# Patient Record
Sex: MALE | Race: BLACK OR AFRICAN AMERICAN | HISPANIC OR LATINO | URBAN - METROPOLITAN AREA
[De-identification: names, ages, dates, MRNs, and addresses within clinical notes are randomized per-mention and may not be internally consistent; named-entity substitution may affect disease eponyms.]

---

## 2018-01-01 ENCOUNTER — OUTPATIENT (OUTPATIENT)
Dept: OUTPATIENT SERVICES | Facility: HOSPITAL | Age: 38
LOS: 1 days | End: 2018-01-01
Payer: MEDICAID

## 2018-01-01 DIAGNOSIS — Z98.89 OTHER SPECIFIED POSTPROCEDURAL STATES: Chronic | ICD-10-CM

## 2018-01-01 PROCEDURE — G9001: CPT

## 2018-01-10 ENCOUNTER — EMERGENCY (EMERGENCY)
Facility: HOSPITAL | Age: 38
LOS: 1 days | Discharge: HOME | End: 2018-01-10
Attending: EMERGENCY MEDICINE
Payer: MEDICARE

## 2018-01-10 VITALS
DIASTOLIC BLOOD PRESSURE: 72 MMHG | SYSTOLIC BLOOD PRESSURE: 107 MMHG | WEIGHT: 315 LBS | OXYGEN SATURATION: 98 % | RESPIRATION RATE: 19 BRPM | HEART RATE: 92 BPM | TEMPERATURE: 98 F | HEIGHT: 78 IN

## 2018-01-10 DIAGNOSIS — Z98.89 OTHER SPECIFIED POSTPROCEDURAL STATES: Chronic | ICD-10-CM

## 2018-01-10 LAB
ALBUMIN SERPL ELPH-MCNC: 3.2 G/DL — LOW (ref 3.5–5)
ALP SERPL-CCNC: 100 U/L — SIGNIFICANT CHANGE UP (ref 40–120)
ALT FLD-CCNC: 41 U/L DA — SIGNIFICANT CHANGE UP (ref 10–60)
ANION GAP SERPL CALC-SCNC: 8 MMOL/L — SIGNIFICANT CHANGE UP (ref 5–17)
AST SERPL-CCNC: 26 U/L — SIGNIFICANT CHANGE UP (ref 10–40)
BASOPHILS # BLD AUTO: 0.1 K/UL — SIGNIFICANT CHANGE UP (ref 0–0.2)
BASOPHILS NFR BLD AUTO: 0.5 % — SIGNIFICANT CHANGE UP (ref 0–2)
BILIRUB SERPL-MCNC: 0.2 MG/DL — SIGNIFICANT CHANGE UP (ref 0.2–1.2)
BUN SERPL-MCNC: 28 MG/DL — HIGH (ref 7–18)
CALCIUM SERPL-MCNC: 8.6 MG/DL — SIGNIFICANT CHANGE UP (ref 8.4–10.5)
CHLORIDE SERPL-SCNC: 99 MMOL/L — SIGNIFICANT CHANGE UP (ref 96–108)
CO2 SERPL-SCNC: 29 MMOL/L — SIGNIFICANT CHANGE UP (ref 22–31)
CREAT SERPL-MCNC: 3.81 MG/DL — HIGH (ref 0.5–1.3)
EOSINOPHIL # BLD AUTO: 0.2 K/UL — SIGNIFICANT CHANGE UP (ref 0–0.5)
EOSINOPHIL NFR BLD AUTO: 1.4 % — SIGNIFICANT CHANGE UP (ref 0–6)
GLUCOSE SERPL-MCNC: 134 MG/DL — HIGH (ref 70–99)
HCT VFR BLD CALC: 40.2 % — SIGNIFICANT CHANGE UP (ref 39–50)
HGB BLD-MCNC: 13.2 G/DL — SIGNIFICANT CHANGE UP (ref 13–17)
LIDOCAIN IGE QN: 312 U/L — SIGNIFICANT CHANGE UP (ref 73–393)
LYMPHOCYTES # BLD AUTO: 16.6 % — SIGNIFICANT CHANGE UP (ref 13–44)
LYMPHOCYTES # BLD AUTO: 2.1 K/UL — SIGNIFICANT CHANGE UP (ref 1–3.3)
MCHC RBC-ENTMCNC: 31.5 PG — SIGNIFICANT CHANGE UP (ref 27–34)
MCHC RBC-ENTMCNC: 32.9 GM/DL — SIGNIFICANT CHANGE UP (ref 32–36)
MCV RBC AUTO: 95.7 FL — SIGNIFICANT CHANGE UP (ref 80–100)
MONOCYTES # BLD AUTO: 0.7 K/UL — SIGNIFICANT CHANGE UP (ref 0–0.9)
MONOCYTES NFR BLD AUTO: 5.8 % — SIGNIFICANT CHANGE UP (ref 2–14)
NEUTROPHILS # BLD AUTO: 9.6 K/UL — HIGH (ref 1.8–7.4)
NEUTROPHILS NFR BLD AUTO: 75.7 % — SIGNIFICANT CHANGE UP (ref 43–77)
PLATELET # BLD AUTO: 330 K/UL — SIGNIFICANT CHANGE UP (ref 150–400)
POTASSIUM SERPL-MCNC: 3.6 MMOL/L — SIGNIFICANT CHANGE UP (ref 3.5–5.3)
POTASSIUM SERPL-SCNC: 3.6 MMOL/L — SIGNIFICANT CHANGE UP (ref 3.5–5.3)
PROT SERPL-MCNC: 8 G/DL — SIGNIFICANT CHANGE UP (ref 6–8.3)
RBC # BLD: 4.2 M/UL — SIGNIFICANT CHANGE UP (ref 4.2–5.8)
RBC # FLD: 16.6 % — HIGH (ref 10.3–14.5)
SODIUM SERPL-SCNC: 136 MMOL/L — SIGNIFICANT CHANGE UP (ref 135–145)
WBC # BLD: 12.6 K/UL — HIGH (ref 3.8–10.5)
WBC # FLD AUTO: 12.6 K/UL — HIGH (ref 3.8–10.5)

## 2018-01-10 PROCEDURE — 99285 EMERGENCY DEPT VISIT HI MDM: CPT | Mod: 25

## 2018-01-10 RX ORDER — HYDROMORPHONE HYDROCHLORIDE 2 MG/ML
1 INJECTION INTRAMUSCULAR; INTRAVENOUS; SUBCUTANEOUS ONCE
Qty: 0 | Refills: 0 | Status: DISCONTINUED | OUTPATIENT
Start: 2018-01-10 | End: 2018-01-10

## 2018-01-10 RX ORDER — ONDANSETRON 8 MG/1
4 TABLET, FILM COATED ORAL ONCE
Qty: 0 | Refills: 0 | Status: COMPLETED | OUTPATIENT
Start: 2018-01-10 | End: 2018-01-10

## 2018-01-10 RX ORDER — SODIUM CHLORIDE 9 MG/ML
3 INJECTION INTRAMUSCULAR; INTRAVENOUS; SUBCUTANEOUS ONCE
Qty: 0 | Refills: 0 | Status: COMPLETED | OUTPATIENT
Start: 2018-01-10 | End: 2018-01-10

## 2018-01-10 RX ADMIN — HYDROMORPHONE HYDROCHLORIDE 1 MILLIGRAM(S): 2 INJECTION INTRAMUSCULAR; INTRAVENOUS; SUBCUTANEOUS at 23:15

## 2018-01-10 RX ADMIN — ONDANSETRON 4 MILLIGRAM(S): 8 TABLET, FILM COATED ORAL at 23:15

## 2018-01-10 RX ADMIN — SODIUM CHLORIDE 3 MILLILITER(S): 9 INJECTION INTRAMUSCULAR; INTRAVENOUS; SUBCUTANEOUS at 22:20

## 2018-01-10 NOTE — ED PROVIDER NOTE - MEDICAL DECISION MAKING DETAILS
Pt is neurovascularly intact s/p abscess I&D. Pt will return in 2 days for wound check. Pt refused packing. Pt is well appearing walking with normal gait, stable for discharge and follow up with medical doctor. Pt educated on care and need for follow up. Discussed anticipatory guidance and return precautions. Questions answered. I had a detailed discussion with the patient and/or guardian regarding the historical points, exam findings, and any diagnostic results supporting the discharge diagnosis. 4:20a- Pt remains well and abdominal pain resolved. Pt is well appearing walking with normal gait, stable for discharge and follow up with medical doctor. Pt educated on care and need for follow up. Discussed anticipatory guidance and return precautions. Questions answered. I had a detailed discussion with the patient and/or guardian regarding the historical points, exam findings, and any diagnostic results supporting the discharge diagnosis.

## 2018-01-10 NOTE — ED ADULT NURSE NOTE - OBJECTIVE STATEMENT
Pt went for dialysis today, did not finish treatment as pt is having abd pain with nausea. treatment went on for 1 hr 30 mins. Av fistula left AC.

## 2018-01-10 NOTE — ED PROVIDER NOTE - OBJECTIVE STATEMENT
36 y/o male with significant PMHx of DM, presents to the ED c/o L mid abd tenderness x 1300 yesterday. Pt notes associated nausea, and denies trauma to area. Pt also received 3.5h of dialysis at [facility] today. Denies recent outside [place] travels, sick contacts or known spoiled food consumption. Pt denies fever, chills, excessive thirst, excessive urination, or any other complaints.

## 2018-01-10 NOTE — ED PROVIDER NOTE - PROGRESS NOTE DETAILS
Pt is resting, no distress, watching videos on smart phone. No guarding to repeated abdominal palpation.

## 2018-01-11 DIAGNOSIS — R69 ILLNESS, UNSPECIFIED: ICD-10-CM

## 2018-01-11 LAB
APPEARANCE UR: CLEAR — SIGNIFICANT CHANGE UP
BILIRUB UR-MCNC: NEGATIVE — SIGNIFICANT CHANGE UP
COLOR SPEC: YELLOW — SIGNIFICANT CHANGE UP
DIFF PNL FLD: ABNORMAL
GLUCOSE UR QL: 100 MG/DL
KETONES UR-MCNC: NEGATIVE — SIGNIFICANT CHANGE UP
LEUKOCYTE ESTERASE UR-ACNC: NEGATIVE — SIGNIFICANT CHANGE UP
NITRITE UR-MCNC: NEGATIVE — SIGNIFICANT CHANGE UP
PH UR: 7 — SIGNIFICANT CHANGE UP (ref 5–8)
PROT UR-MCNC: 500 MG/DL
SP GR SPEC: 1.01 — SIGNIFICANT CHANGE UP (ref 1.01–1.02)
UROBILINOGEN FLD QL: NEGATIVE — SIGNIFICANT CHANGE UP

## 2018-01-11 PROCEDURE — 74176 CT ABD & PELVIS W/O CONTRAST: CPT

## 2018-01-11 PROCEDURE — 83690 ASSAY OF LIPASE: CPT

## 2018-01-11 PROCEDURE — 81001 URINALYSIS AUTO W/SCOPE: CPT

## 2018-01-11 PROCEDURE — 96374 THER/PROPH/DIAG INJ IV PUSH: CPT | Mod: XU

## 2018-01-11 PROCEDURE — 87086 URINE CULTURE/COLONY COUNT: CPT

## 2018-01-11 PROCEDURE — 85027 COMPLETE CBC AUTOMATED: CPT

## 2018-01-11 PROCEDURE — 99284 EMERGENCY DEPT VISIT MOD MDM: CPT | Mod: 25

## 2018-01-11 PROCEDURE — 96375 TX/PRO/DX INJ NEW DRUG ADDON: CPT

## 2018-01-11 PROCEDURE — 80053 COMPREHEN METABOLIC PANEL: CPT

## 2018-01-11 PROCEDURE — 74176 CT ABD & PELVIS W/O CONTRAST: CPT | Mod: 26

## 2018-01-11 RX ORDER — ACETAMINOPHEN 500 MG
975 TABLET ORAL ONCE
Qty: 0 | Refills: 0 | Status: COMPLETED | OUTPATIENT
Start: 2018-01-11 | End: 2018-01-11

## 2018-01-11 RX ORDER — ACETAMINOPHEN 500 MG
2 TABLET ORAL
Qty: 20 | Refills: 0
Start: 2018-01-11

## 2018-01-11 RX ADMIN — Medication 975 MILLIGRAM(S): at 03:32

## 2018-01-12 LAB
CULTURE RESULTS: NO GROWTH — SIGNIFICANT CHANGE UP
SPECIMEN SOURCE: SIGNIFICANT CHANGE UP

## 2018-02-15 ENCOUNTER — EMERGENCY (EMERGENCY)
Facility: HOSPITAL | Age: 38
LOS: 1 days | Discharge: HOME | End: 2018-02-15
Attending: EMERGENCY MEDICINE
Payer: MEDICARE

## 2018-02-15 VITALS
RESPIRATION RATE: 20 BRPM | SYSTOLIC BLOOD PRESSURE: 133 MMHG | OXYGEN SATURATION: 96 % | TEMPERATURE: 99 F | HEART RATE: 99 BPM | DIASTOLIC BLOOD PRESSURE: 87 MMHG

## 2018-02-15 VITALS — WEIGHT: 315 LBS | HEIGHT: 78 IN

## 2018-02-15 DIAGNOSIS — Z98.89 OTHER SPECIFIED POSTPROCEDURAL STATES: Chronic | ICD-10-CM

## 2018-02-15 PROCEDURE — 73562 X-RAY EXAM OF KNEE 3: CPT | Mod: 26,LT

## 2018-02-15 PROCEDURE — 72100 X-RAY EXAM L-S SPINE 2/3 VWS: CPT | Mod: 26

## 2018-02-15 PROCEDURE — 99284 EMERGENCY DEPT VISIT MOD MDM: CPT | Mod: 25

## 2018-02-15 PROCEDURE — 99284 EMERGENCY DEPT VISIT MOD MDM: CPT

## 2018-02-15 PROCEDURE — 73562 X-RAY EXAM OF KNEE 3: CPT

## 2018-02-15 PROCEDURE — 72100 X-RAY EXAM L-S SPINE 2/3 VWS: CPT

## 2018-02-15 RX ORDER — METHOCARBAMOL 500 MG/1
1 TABLET, FILM COATED ORAL
Qty: 30 | Refills: 0 | OUTPATIENT
Start: 2018-02-15 | End: 2018-02-24

## 2018-02-15 RX ORDER — ACETAMINOPHEN 500 MG
2 TABLET ORAL
Qty: 136 | Refills: 0
Start: 2018-02-15 | End: 2018-03-03
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
## 2018-03-22 ENCOUNTER — EMERGENCY (EMERGENCY)
Facility: HOSPITAL | Age: 38
LOS: 1 days | Discharge: HOME | End: 2018-03-22
Attending: EMERGENCY MEDICINE
Payer: MEDICARE

## 2018-03-22 VITALS
HEART RATE: 100 BPM | WEIGHT: 315 LBS | TEMPERATURE: 99 F | SYSTOLIC BLOOD PRESSURE: 118 MMHG | RESPIRATION RATE: 18 BRPM | DIASTOLIC BLOOD PRESSURE: 74 MMHG | HEIGHT: 78 IN

## 2018-03-22 VITALS
SYSTOLIC BLOOD PRESSURE: 127 MMHG | OXYGEN SATURATION: 97 % | DIASTOLIC BLOOD PRESSURE: 68 MMHG | HEART RATE: 91 BPM | RESPIRATION RATE: 18 BRPM | TEMPERATURE: 100 F

## 2018-03-22 DIAGNOSIS — Z98.89 OTHER SPECIFIED POSTPROCEDURAL STATES: Chronic | ICD-10-CM

## 2018-03-22 PROCEDURE — 99285 EMERGENCY DEPT VISIT HI MDM: CPT | Mod: 25

## 2018-03-22 PROCEDURE — 73610 X-RAY EXAM OF ANKLE: CPT | Mod: 26,RT

## 2018-03-22 RX ORDER — SODIUM CHLORIDE 9 MG/ML
3 INJECTION INTRAMUSCULAR; INTRAVENOUS; SUBCUTANEOUS ONCE
Qty: 0 | Refills: 0 | Status: COMPLETED | OUTPATIENT
Start: 2018-03-22 | End: 2018-03-22

## 2018-03-22 RX ORDER — HYDROMORPHONE HYDROCHLORIDE 2 MG/ML
0.5 INJECTION INTRAMUSCULAR; INTRAVENOUS; SUBCUTANEOUS ONCE
Qty: 0 | Refills: 0 | Status: DISCONTINUED | OUTPATIENT
Start: 2018-03-22 | End: 2018-03-22

## 2018-03-22 RX ADMIN — HYDROMORPHONE HYDROCHLORIDE 0.5 MILLIGRAM(S): 2 INJECTION INTRAMUSCULAR; INTRAVENOUS; SUBCUTANEOUS at 23:38

## 2018-03-22 RX ADMIN — SODIUM CHLORIDE 3 MILLILITER(S): 9 INJECTION INTRAMUSCULAR; INTRAVENOUS; SUBCUTANEOUS at 23:21

## 2018-03-22 NOTE — ED PROVIDER NOTE - PROGRESS NOTE DETAILS
Pt feels better, able to bear weight on foot. Educated on close f/u orthopedics. Refused core temp, made aware to return for worsening pain, fever or any other concerns.

## 2018-03-22 NOTE — ED ADULT TRIAGE NOTE - CHIEF COMPLAINT QUOTE
" I have bad pain on my left and right ankle pain, had previous injury" " I miss my dialysis treatment today also"

## 2018-03-22 NOTE — ED PROVIDER NOTE - MUSCULOSKELETAL, MLM
Bilateral edema 1+, tenderness to the R medial malleolus Bilateral trace edema, tenderness to the R medial malleolus, no effusion, FROM but painful, no warmth or erythema

## 2018-03-22 NOTE — ED PROVIDER NOTE - OBJECTIVE STATEMENT
38 y/o M pt w/ PMHx of DM, ESRD (on dialysis [facility], missed dialysis today) presents to ED c/o R ankle pain, worsened x today. Pt states he fractured his R ankle in November and was put in a metal boot at the time, which was removed in December. However, pain returned a couple of days ago and became severe today. Pt states he can't walk secondary to the pain. Pt also reports 2 days of diarrhea, which he states he gets sometimes. Pt also endorses b/l knee pain since 1.5 months ago; pt states he has not been able to climb stairs due to his knee pain. NKDA. 38 y/o M pt w/ PMHx of DM, ESRD (on dialysis [facility], missed dialysis today) presents to ED c/o R ankle pain, worsened x today. Pt states he fractured his R ankle in November and was put in a metal boot at the time, which was removed in December. However, pain returned a couple of days ago and became severe today. Pt states he can't walk secondary to the pain. Pt also reports 2 days of diarrhea, which he states he gets sometimes. Pt also endorses b/l knee pain since 1.5 months ago; pt states he has not been able to climb stairs due to his knee pain. Pt is allergic to multiple drugs as listed below. 38 y/o M pt w/ PMHx of DM, ESRD (on dialysis [facility], missed dialysis today) presents to ED c/o R ankle pain, worsened x today. Pt states he fractured his R ankle in November and was put in a metal boot at the time, which was removed in December. However, pain returned a couple of days ago and became severe today. Pt states he can't walk secondary to the pain. Pt also reports 2 days of diarrhea, which he states he gets sometimes. Pt also endorses b/l knee pain since 1.5 months ago; pt states he has not been able to climb stairs due to his knee pain. No f/c/cough/rash/sick contacts. No h/o STD. No h/o gout. Pt is allergic to multiple drugs as listed below.

## 2018-03-22 NOTE — ED ADULT NURSE NOTE - OBJECTIVE STATEMENT
Pt AOx3, ambulatory, c/o right ankle pain. P/w right ankle swelling, redness. Pt denies any recent fall/trauma. Pt receives HD, but missed his appt today.

## 2018-03-23 LAB
ALBUMIN SERPL ELPH-MCNC: 3.1 G/DL — LOW (ref 3.5–5)
ALP SERPL-CCNC: 114 U/L — SIGNIFICANT CHANGE UP (ref 40–120)
ALT FLD-CCNC: 24 U/L DA — SIGNIFICANT CHANGE UP (ref 10–60)
ANION GAP SERPL CALC-SCNC: 14 MMOL/L — SIGNIFICANT CHANGE UP (ref 5–17)
AST SERPL-CCNC: 28 U/L — SIGNIFICANT CHANGE UP (ref 10–40)
BASOPHILS # BLD AUTO: 0.1 K/UL — SIGNIFICANT CHANGE UP (ref 0–0.2)
BASOPHILS NFR BLD AUTO: 0.9 % — SIGNIFICANT CHANGE UP (ref 0–2)
BILIRUB SERPL-MCNC: 0.4 MG/DL — SIGNIFICANT CHANGE UP (ref 0.2–1.2)
BUN SERPL-MCNC: 47 MG/DL — HIGH (ref 7–18)
CALCIUM SERPL-MCNC: 9.4 MG/DL — SIGNIFICANT CHANGE UP (ref 8.4–10.5)
CHLORIDE SERPL-SCNC: 103 MMOL/L — SIGNIFICANT CHANGE UP (ref 96–108)
CO2 SERPL-SCNC: 21 MMOL/L — LOW (ref 22–31)
CREAT SERPL-MCNC: 6.89 MG/DL — HIGH (ref 0.5–1.3)
EOSINOPHIL # BLD AUTO: 0.5 K/UL — SIGNIFICANT CHANGE UP (ref 0–0.5)
EOSINOPHIL NFR BLD AUTO: 3.2 % — SIGNIFICANT CHANGE UP (ref 0–6)
ERYTHROCYTE [SEDIMENTATION RATE] IN BLOOD: 42 MM/HR — HIGH (ref 0–15)
GLUCOSE SERPL-MCNC: 168 MG/DL — HIGH (ref 70–99)
HCT VFR BLD CALC: 40.1 % — SIGNIFICANT CHANGE UP (ref 39–50)
HGB BLD-MCNC: 13.2 G/DL — SIGNIFICANT CHANGE UP (ref 13–17)
LIDOCAIN IGE QN: 193 U/L — SIGNIFICANT CHANGE UP (ref 73–393)
LYMPHOCYTES # BLD AUTO: 19.9 % — SIGNIFICANT CHANGE UP (ref 13–44)
LYMPHOCYTES # BLD AUTO: 3.1 K/UL — SIGNIFICANT CHANGE UP (ref 1–3.3)
MCHC RBC-ENTMCNC: 31.5 PG — SIGNIFICANT CHANGE UP (ref 27–34)
MCHC RBC-ENTMCNC: 32.9 GM/DL — SIGNIFICANT CHANGE UP (ref 32–36)
MCV RBC AUTO: 95.8 FL — SIGNIFICANT CHANGE UP (ref 80–100)
MONOCYTES # BLD AUTO: 1 K/UL — HIGH (ref 0–0.9)
MONOCYTES NFR BLD AUTO: 6.4 % — SIGNIFICANT CHANGE UP (ref 2–14)
NEUTROPHILS # BLD AUTO: 10.8 K/UL — HIGH (ref 1.8–7.4)
NEUTROPHILS NFR BLD AUTO: 69.7 % — SIGNIFICANT CHANGE UP (ref 43–77)
PLATELET # BLD AUTO: 313 K/UL — SIGNIFICANT CHANGE UP (ref 150–400)
POTASSIUM SERPL-MCNC: 4.2 MMOL/L — SIGNIFICANT CHANGE UP (ref 3.5–5.3)
POTASSIUM SERPL-SCNC: 4.2 MMOL/L — SIGNIFICANT CHANGE UP (ref 3.5–5.3)
PROT SERPL-MCNC: 7.9 G/DL — SIGNIFICANT CHANGE UP (ref 6–8.3)
RBC # BLD: 4.18 M/UL — LOW (ref 4.2–5.8)
RBC # FLD: 14.2 % — SIGNIFICANT CHANGE UP (ref 10.3–14.5)
SODIUM SERPL-SCNC: 138 MMOL/L — SIGNIFICANT CHANGE UP (ref 135–145)
URATE SERPL-MCNC: 6.5 MG/DL — SIGNIFICANT CHANGE UP (ref 3.4–8.8)
WBC # BLD: 15.4 K/UL — HIGH (ref 3.8–10.5)
WBC # FLD AUTO: 15.4 K/UL — HIGH (ref 3.8–10.5)

## 2018-03-23 PROCEDURE — 80053 COMPREHEN METABOLIC PANEL: CPT

## 2018-03-23 PROCEDURE — 99284 EMERGENCY DEPT VISIT MOD MDM: CPT | Mod: 25

## 2018-03-23 PROCEDURE — 84550 ASSAY OF BLOOD/URIC ACID: CPT

## 2018-03-23 PROCEDURE — 73610 X-RAY EXAM OF ANKLE: CPT

## 2018-03-23 PROCEDURE — 96374 THER/PROPH/DIAG INJ IV PUSH: CPT

## 2018-03-23 PROCEDURE — 85027 COMPLETE CBC AUTOMATED: CPT

## 2018-03-23 PROCEDURE — 85652 RBC SED RATE AUTOMATED: CPT

## 2018-03-23 PROCEDURE — 96376 TX/PRO/DX INJ SAME DRUG ADON: CPT

## 2018-03-23 PROCEDURE — 83690 ASSAY OF LIPASE: CPT

## 2018-03-23 RX ORDER — HYDROMORPHONE HYDROCHLORIDE 2 MG/ML
0.5 INJECTION INTRAMUSCULAR; INTRAVENOUS; SUBCUTANEOUS ONCE
Qty: 0 | Refills: 0 | Status: DISCONTINUED | OUTPATIENT
Start: 2018-03-23 | End: 2018-03-23

## 2018-03-23 RX ADMIN — HYDROMORPHONE HYDROCHLORIDE 0.5 MILLIGRAM(S): 2 INJECTION INTRAMUSCULAR; INTRAVENOUS; SUBCUTANEOUS at 00:08

## 2018-03-23 RX ADMIN — HYDROMORPHONE HYDROCHLORIDE 0.5 MILLIGRAM(S): 2 INJECTION INTRAMUSCULAR; INTRAVENOUS; SUBCUTANEOUS at 01:38

## 2018-05-01 ENCOUNTER — OUTPATIENT (OUTPATIENT)
Dept: OUTPATIENT SERVICES | Facility: HOSPITAL | Age: 38
LOS: 1 days | End: 2018-05-01
Payer: MEDICAID

## 2018-05-01 DIAGNOSIS — Z98.89 OTHER SPECIFIED POSTPROCEDURAL STATES: Chronic | ICD-10-CM

## 2018-05-01 PROCEDURE — G9001: CPT

## 2018-05-03 DIAGNOSIS — R69 ILLNESS, UNSPECIFIED: ICD-10-CM

## 2018-05-24 ENCOUNTER — EMERGENCY (EMERGENCY)
Facility: HOSPITAL | Age: 38
LOS: 1 days | Discharge: HOME | End: 2018-05-24
Attending: EMERGENCY MEDICINE
Payer: MEDICARE

## 2018-05-24 VITALS
HEIGHT: 78 IN | TEMPERATURE: 98 F | WEIGHT: 315 LBS | DIASTOLIC BLOOD PRESSURE: 78 MMHG | RESPIRATION RATE: 18 BRPM | HEART RATE: 104 BPM | SYSTOLIC BLOOD PRESSURE: 113 MMHG | OXYGEN SATURATION: 98 %

## 2018-05-24 DIAGNOSIS — Z98.89 OTHER SPECIFIED POSTPROCEDURAL STATES: Chronic | ICD-10-CM

## 2018-05-24 PROCEDURE — 99283 EMERGENCY DEPT VISIT LOW MDM: CPT | Mod: 25

## 2018-05-24 PROCEDURE — 99282 EMERGENCY DEPT VISIT SF MDM: CPT

## 2018-05-24 NOTE — ED BEHAVIORAL HEALTH NOTE - BEHAVIORAL HEALTH NOTE
General education provided to Dr. Tucker regarding starting medications in ED that generally would not restart psychiatric medication in a patient who has been off several months, especially given patient has follow up scheduled. No patient-specific specific recommendations provided as telepsychiatry did not see patient. Dr. Tucker informed to consult telepsychiatry as needed.

## 2018-05-24 NOTE — ED ADULT TRIAGE NOTE - CHIEF COMPLAINT QUOTE
sent for evaluation,pt ran out of meds ,with psyche history ,on dialysis last dialysis was this morning sent for evaluation,pt ran out of meds ,with psyche history ,on dialysis last dialysis was this morning,denies chest pain denies sob,i only have colds.,no fever sent for evaluation,pt ran out of meds since thanksgiving  ,with psyche history ,on dialysis last dialysis was this morning,denies chest pain denies sob,i only have colds.,no fever

## 2018-05-24 NOTE — ED PROVIDER NOTE - PROGRESS NOTE DETAILS
not tachy on my exam. pt has no complaints, d/w telepsych - states that he has no acute symptoms - does not need an eval and she would advise that I not write for these prescriptions as he has been stable for months without them. pt declines any labs and does not want any more evaluation. given that he is stable from psychiatric point of view and no si/hi, will abide by his request. not tachy on my exam. pt has no complaints, d/w telepsych - states that if there are not any safety concern / acute symptoms - may not need an eval and generally would not start these prescriptions in the ED. karan as he has been stable for months without them.

## 2018-05-24 NOTE — ED PROVIDER NOTE - MEDICAL DECISION MAKING DETAILS
psychiatric labs, dw psychiatry regarding whether it is appropriate to write the rx. pt has renal f/u who should likely write them as a generalist.

## 2018-05-24 NOTE — ED PROVIDER NOTE - OBJECTIVE STATEMENT
38 male asking for refill of all his meds. was seen today [facility] center today and sent to ER with note - photocopy in chart. requesting gabapentin, simvastatin, sevelamir, serisipar methocarbamol, mirtazapin, quetiapine, lantus. pt follows at [facility] for renal and nephrologist has not written for these psychiatric meds. has not had any of these since november. pt has appt for 6/11 for a new psychiatrist. got dialysis today.

## 2018-05-24 NOTE — ED ADULT NURSE NOTE - CHIEF COMPLAINT QUOTE
sent for evaluation,pt ran out of meds since thanksgiving  ,with psyche history ,on dialysis last dialysis was this morning,denies chest pain denies sob,i only have colds.,no fever

## 2018-05-24 NOTE — ED PROVIDER NOTE - PSYCHIATRIC, MLM
Alert and oriented to person, place, time/situation. normal mood and affect. no apparent risk to self or others. denies si. denies hi. states he hears voices. they do not tell him anything in specific.

## 2018-06-19 ENCOUNTER — INPATIENT (INPATIENT)
Facility: HOSPITAL | Age: 38
LOS: 0 days | Discharge: HOME | DRG: 682 | End: 2018-06-20
Attending: STUDENT IN AN ORGANIZED HEALTH CARE EDUCATION/TRAINING PROGRAM | Admitting: STUDENT IN AN ORGANIZED HEALTH CARE EDUCATION/TRAINING PROGRAM
Payer: COMMERCIAL

## 2018-06-19 VITALS
WEIGHT: 315 LBS | SYSTOLIC BLOOD PRESSURE: 130 MMHG | TEMPERATURE: 98 F | DIASTOLIC BLOOD PRESSURE: 79 MMHG | HEIGHT: 78 IN | RESPIRATION RATE: 19 BRPM | HEART RATE: 87 BPM | OXYGEN SATURATION: 97 %

## 2018-06-19 DIAGNOSIS — E11.9 TYPE 2 DIABETES MELLITUS WITHOUT COMPLICATIONS: ICD-10-CM

## 2018-06-19 DIAGNOSIS — Z29.9 ENCOUNTER FOR PROPHYLACTIC MEASURES, UNSPECIFIED: ICD-10-CM

## 2018-06-19 DIAGNOSIS — F17.200 NICOTINE DEPENDENCE, UNSPECIFIED, UNCOMPLICATED: ICD-10-CM

## 2018-06-19 DIAGNOSIS — I10 ESSENTIAL (PRIMARY) HYPERTENSION: ICD-10-CM

## 2018-06-19 DIAGNOSIS — N18.6 END STAGE RENAL DISEASE: ICD-10-CM

## 2018-06-19 DIAGNOSIS — Z98.89 OTHER SPECIFIED POSTPROCEDURAL STATES: Chronic | ICD-10-CM

## 2018-06-19 LAB
24R-OH-CALCIDIOL SERPL-MCNC: 19.8 NG/ML — LOW (ref 30–80)
ACETONE SERPL-MCNC: NEGATIVE — SIGNIFICANT CHANGE UP
ALBUMIN SERPL ELPH-MCNC: 2.6 G/DL — LOW (ref 3.5–5)
ALBUMIN SERPL ELPH-MCNC: 2.7 G/DL — LOW (ref 3.5–5)
ALP SERPL-CCNC: 140 U/L — HIGH (ref 40–120)
ALP SERPL-CCNC: 144 U/L — HIGH (ref 40–120)
ALT FLD-CCNC: 17 U/L DA — SIGNIFICANT CHANGE UP (ref 10–60)
ALT FLD-CCNC: 20 U/L DA — SIGNIFICANT CHANGE UP (ref 10–60)
ANION GAP SERPL CALC-SCNC: 11 MMOL/L — SIGNIFICANT CHANGE UP (ref 5–17)
ANION GAP SERPL CALC-SCNC: 13 MMOL/L — SIGNIFICANT CHANGE UP (ref 5–17)
APPEARANCE UR: CLEAR — SIGNIFICANT CHANGE UP
APTT BLD: 33.7 SEC — SIGNIFICANT CHANGE UP (ref 27.5–37.4)
AST SERPL-CCNC: 10 U/L — SIGNIFICANT CHANGE UP (ref 10–40)
AST SERPL-CCNC: 41 U/L — HIGH (ref 10–40)
B-OH-BUTYR SERPL-SCNC: 0.1 MMOL/L — SIGNIFICANT CHANGE UP
BILIRUB SERPL-MCNC: 0.2 MG/DL — SIGNIFICANT CHANGE UP (ref 0.2–1.2)
BILIRUB SERPL-MCNC: 0.4 MG/DL — SIGNIFICANT CHANGE UP (ref 0.2–1.2)
BILIRUB UR-MCNC: NEGATIVE — SIGNIFICANT CHANGE UP
BUN SERPL-MCNC: 61 MG/DL — HIGH (ref 7–18)
BUN SERPL-MCNC: 62 MG/DL — HIGH (ref 7–18)
CALCIUM SERPL-MCNC: 8.2 MG/DL — LOW (ref 8.4–10.5)
CALCIUM SERPL-MCNC: 8.3 MG/DL — LOW (ref 8.4–10.5)
CHLORIDE SERPL-SCNC: 110 MMOL/L — HIGH (ref 96–108)
CHLORIDE SERPL-SCNC: 111 MMOL/L — HIGH (ref 96–108)
CHOLEST SERPL-MCNC: 149 MG/DL — SIGNIFICANT CHANGE UP (ref 10–199)
CO2 SERPL-SCNC: 13 MMOL/L — LOW (ref 22–31)
CO2 SERPL-SCNC: 19 MMOL/L — LOW (ref 22–31)
COLOR SPEC: SIGNIFICANT CHANGE UP
CREAT SERPL-MCNC: 7.73 MG/DL — HIGH (ref 0.5–1.3)
CREAT SERPL-MCNC: 7.81 MG/DL — HIGH (ref 0.5–1.3)
DIFF PNL FLD: ABNORMAL
GLUCOSE SERPL-MCNC: 138 MG/DL — HIGH (ref 70–99)
GLUCOSE SERPL-MCNC: 215 MG/DL — HIGH (ref 70–99)
GLUCOSE UR QL: 250
HBA1C BLD-MCNC: 8.2 % — HIGH (ref 4–5.6)
HCT VFR BLD CALC: 38.9 % — LOW (ref 39–50)
HDLC SERPL-MCNC: 43 MG/DL — SIGNIFICANT CHANGE UP (ref 40–125)
HGB BLD-MCNC: 12.3 G/DL — LOW (ref 13–17)
INR BLD: 0.98 RATIO — SIGNIFICANT CHANGE UP (ref 0.88–1.16)
KETONES UR-MCNC: NEGATIVE — SIGNIFICANT CHANGE UP
LEUKOCYTE ESTERASE UR-ACNC: NEGATIVE — SIGNIFICANT CHANGE UP
LIPID PNL WITH DIRECT LDL SERPL: 71 MG/DL — SIGNIFICANT CHANGE UP
MAGNESIUM SERPL-MCNC: 2 MG/DL — SIGNIFICANT CHANGE UP (ref 1.6–2.6)
MCHC RBC-ENTMCNC: 30.5 PG — SIGNIFICANT CHANGE UP (ref 27–34)
MCHC RBC-ENTMCNC: 31.7 GM/DL — LOW (ref 32–36)
MCV RBC AUTO: 96 FL — SIGNIFICANT CHANGE UP (ref 80–100)
NITRITE UR-MCNC: NEGATIVE — SIGNIFICANT CHANGE UP
PH UR: 6 — SIGNIFICANT CHANGE UP (ref 5–8)
PHOSPHATE SERPL-MCNC: 5.3 MG/DL — HIGH (ref 2.5–4.5)
PLATELET # BLD AUTO: 324 K/UL — SIGNIFICANT CHANGE UP (ref 150–400)
POTASSIUM SERPL-MCNC: 4.3 MMOL/L — SIGNIFICANT CHANGE UP (ref 3.5–5.3)
POTASSIUM SERPL-MCNC: 5.3 MMOL/L — SIGNIFICANT CHANGE UP (ref 3.5–5.3)
POTASSIUM SERPL-SCNC: 4.3 MMOL/L — SIGNIFICANT CHANGE UP (ref 3.5–5.3)
POTASSIUM SERPL-SCNC: 5.3 MMOL/L — SIGNIFICANT CHANGE UP (ref 3.5–5.3)
PROT SERPL-MCNC: 7.1 G/DL — SIGNIFICANT CHANGE UP (ref 6–8.3)
PROT SERPL-MCNC: 7.6 G/DL — SIGNIFICANT CHANGE UP (ref 6–8.3)
PROT UR-MCNC: 500 MG/DL
PROTHROM AB SERPL-ACNC: 10.7 SEC — SIGNIFICANT CHANGE UP (ref 9.8–12.7)
RBC # BLD: 4.05 M/UL — LOW (ref 4.2–5.8)
RBC # FLD: 14.2 % — SIGNIFICANT CHANGE UP (ref 10.3–14.5)
SODIUM SERPL-SCNC: 137 MMOL/L — SIGNIFICANT CHANGE UP (ref 135–145)
SODIUM SERPL-SCNC: 140 MMOL/L — SIGNIFICANT CHANGE UP (ref 135–145)
SP GR SPEC: 1.01 — SIGNIFICANT CHANGE UP (ref 1.01–1.02)
TOTAL CHOLESTEROL/HDL RATIO MEASUREMENT: 3.5 RATIO — SIGNIFICANT CHANGE UP (ref 3.4–9.6)
TRIGL SERPL-MCNC: 175 MG/DL — HIGH (ref 10–149)
UROBILINOGEN FLD QL: NEGATIVE — SIGNIFICANT CHANGE UP
WBC # BLD: 11.6 K/UL — HIGH (ref 3.8–10.5)
WBC # FLD AUTO: 11.6 K/UL — HIGH (ref 3.8–10.5)

## 2018-06-19 PROCEDURE — 99223 1ST HOSP IP/OBS HIGH 75: CPT | Mod: AI,GC

## 2018-06-19 PROCEDURE — 71045 X-RAY EXAM CHEST 1 VIEW: CPT | Mod: 26

## 2018-06-19 PROCEDURE — 99285 EMERGENCY DEPT VISIT HI MDM: CPT | Mod: 25

## 2018-06-19 RX ORDER — QUETIAPINE FUMARATE 200 MG/1
100 TABLET, FILM COATED ORAL DAILY
Qty: 0 | Refills: 0 | Status: DISCONTINUED | OUTPATIENT
Start: 2018-06-19 | End: 2018-06-20

## 2018-06-19 RX ORDER — CINACALCET 30 MG/1
30 TABLET, FILM COATED ORAL DAILY
Qty: 0 | Refills: 0 | Status: DISCONTINUED | OUTPATIENT
Start: 2018-06-19 | End: 2018-06-20

## 2018-06-19 RX ORDER — SEVELAMER CARBONATE 2400 MG/1
0 POWDER, FOR SUSPENSION ORAL
Qty: 0 | Refills: 0 | COMMUNITY

## 2018-06-19 RX ORDER — INSULIN LISPRO 100/ML
5 VIAL (ML) SUBCUTANEOUS
Qty: 0 | Refills: 0 | Status: DISCONTINUED | OUTPATIENT
Start: 2018-06-19 | End: 2018-06-20

## 2018-06-19 RX ORDER — SODIUM CHLORIDE 9 MG/ML
3 INJECTION INTRAMUSCULAR; INTRAVENOUS; SUBCUTANEOUS ONCE
Qty: 0 | Refills: 0 | Status: COMPLETED | OUTPATIENT
Start: 2018-06-19 | End: 2018-06-19

## 2018-06-19 RX ORDER — SIMVASTATIN 20 MG/1
0 TABLET, FILM COATED ORAL
Qty: 0 | Refills: 0 | COMMUNITY

## 2018-06-19 RX ORDER — NICOTINE POLACRILEX 2 MG
1 GUM BUCCAL DAILY
Qty: 0 | Refills: 0 | Status: DISCONTINUED | OUTPATIENT
Start: 2018-06-19 | End: 2018-06-20

## 2018-06-19 RX ORDER — MIRTAZAPINE 45 MG/1
45 TABLET, ORALLY DISINTEGRATING ORAL DAILY
Qty: 0 | Refills: 0 | Status: DISCONTINUED | OUTPATIENT
Start: 2018-06-19 | End: 2018-06-20

## 2018-06-19 RX ORDER — INSULIN GLARGINE 100 [IU]/ML
30 INJECTION, SOLUTION SUBCUTANEOUS AT BEDTIME
Qty: 0 | Refills: 0 | Status: DISCONTINUED | OUTPATIENT
Start: 2018-06-19 | End: 2018-06-20

## 2018-06-19 RX ORDER — PANTOPRAZOLE SODIUM 20 MG/1
40 TABLET, DELAYED RELEASE ORAL
Qty: 0 | Refills: 0 | Status: DISCONTINUED | OUTPATIENT
Start: 2018-06-19 | End: 2018-06-20

## 2018-06-19 RX ORDER — HALOPERIDOL DECANOATE 100 MG/ML
0 INJECTION INTRAMUSCULAR
Qty: 0 | Refills: 0 | COMMUNITY

## 2018-06-19 RX ORDER — INSULIN LISPRO 100/ML
VIAL (ML) SUBCUTANEOUS
Qty: 0 | Refills: 0 | Status: DISCONTINUED | OUTPATIENT
Start: 2018-06-19 | End: 2018-06-20

## 2018-06-19 RX ORDER — INSULIN ASPART 100 [IU]/ML
0 INJECTION, SOLUTION SUBCUTANEOUS
Qty: 0 | Refills: 0 | COMMUNITY

## 2018-06-19 RX ORDER — GABAPENTIN 400 MG/1
400 CAPSULE ORAL
Qty: 0 | Refills: 0 | Status: DISCONTINUED | OUTPATIENT
Start: 2018-06-19 | End: 2018-06-20

## 2018-06-19 RX ORDER — SEVELAMER CARBONATE 2400 MG/1
1600 POWDER, FOR SUSPENSION ORAL THREE TIMES A DAY
Qty: 0 | Refills: 0 | Status: DISCONTINUED | OUTPATIENT
Start: 2018-06-19 | End: 2018-06-20

## 2018-06-19 RX ORDER — SUCROFERRIC OXYHYDROXIDE 500 MG/1
0 TABLET, CHEWABLE ORAL
Qty: 0 | Refills: 0 | COMMUNITY

## 2018-06-19 RX ORDER — SIMVASTATIN 20 MG/1
40 TABLET, FILM COATED ORAL AT BEDTIME
Qty: 0 | Refills: 0 | Status: DISCONTINUED | OUTPATIENT
Start: 2018-06-19 | End: 2018-06-20

## 2018-06-19 RX ORDER — FUROSEMIDE 40 MG
80 TABLET ORAL DAILY
Qty: 0 | Refills: 0 | Status: DISCONTINUED | OUTPATIENT
Start: 2018-06-19 | End: 2018-06-20

## 2018-06-19 RX ORDER — MIRTAZAPINE 45 MG/1
0 TABLET, ORALLY DISINTEGRATING ORAL
Qty: 0 | Refills: 0 | COMMUNITY

## 2018-06-19 RX ORDER — INSULIN GLARGINE 100 [IU]/ML
0 INJECTION, SOLUTION SUBCUTANEOUS
Qty: 0 | Refills: 0 | COMMUNITY

## 2018-06-19 RX ADMIN — Medication 1 PATCH: at 11:37

## 2018-06-19 RX ADMIN — Medication 5 UNIT(S): at 18:12

## 2018-06-19 RX ADMIN — QUETIAPINE FUMARATE 100 MILLIGRAM(S): 200 TABLET, FILM COATED ORAL at 23:27

## 2018-06-19 RX ADMIN — INSULIN GLARGINE 30 UNIT(S): 100 INJECTION, SOLUTION SUBCUTANEOUS at 22:20

## 2018-06-19 RX ADMIN — SIMVASTATIN 40 MILLIGRAM(S): 20 TABLET, FILM COATED ORAL at 22:21

## 2018-06-19 RX ADMIN — SEVELAMER CARBONATE 1600 MILLIGRAM(S): 2400 POWDER, FOR SUSPENSION ORAL at 22:21

## 2018-06-19 RX ADMIN — GABAPENTIN 400 MILLIGRAM(S): 400 CAPSULE ORAL at 18:25

## 2018-06-19 RX ADMIN — MIRTAZAPINE 45 MILLIGRAM(S): 45 TABLET, ORALLY DISINTEGRATING ORAL at 23:27

## 2018-06-19 NOTE — ED PROVIDER NOTE - MEDICAL DECISION MAKING DETAILS
Pt will require dialysis. Suspect DKA versus uremic encephalopathy. Will discuss with renal and admit pt.

## 2018-06-19 NOTE — H&P ADULT - NSHPPHYSICALEXAM_GEN_ALL_CORE
GENERAL: no acute distress, morbidly obese, comfortable  HEAD: atraumatic, normocephalic   EYES: PERRLA, white sclera.   ENMT: nasal mucosa- Oral cavity- moist  NECK: supple, no JVD, thyroid- not palpable  LYMPH: no palpable lymph nodes     SKIN:  warm and dry  CHEST/LUNG: No Chest deformity , no chest tenderness. difficult auscultation 2/2 to big body habitus  HEART: RRR, could not appreciate murmur  ABDOMEN: soft, nontender, distended; bowel sounds+  EXTREMITIES: no pitting pedal edema, no cyanosis, no clubbing.  NEURO: AA0X3 , mood/ affect- stable , no focal neuro deficits

## 2018-06-19 NOTE — H&P ADULT - NEGATIVE NEUROLOGICAL SYMPTOMS
no vertigo/no generalized seizures/no loss of consciousness/no transient paralysis/no weakness/no focal seizures/no syncope/no difficulty walking/no headache/no confusion/no hemiparesis/no tremors/no loss of sensation/no facial palsy

## 2018-06-19 NOTE — H&P ADULT - ATTENDING COMMENTS
Agree with above   Patient seen and examined in ED. He is a 39 yo morbidly obese man, current smoker male  from home  PMhx HTN,  ESRD on HD, TTS, DM comes in for eval after sent in by dialysis center for missed dialysis for 1 week.   He says he was working, hence missed dialysis. denies SOB/edema etc, only complaint is that sometimes he has urinary incontinence. ROS negative for fever/chills/headache/dizziness/abdo pain, N/V/D/ leg swelling/PIMENTEL etc  He is a current active smoker 1 pack/3 days since age 17, social drinker, Surgical Hx: Left arm AV graft, abdo hernia repair, laproscopic, Fhx: unable to recollect.   In ED patient is sitting on bed comfortably, not in fluid overload   No CBC  was drown secondary to patient being a difficult stick     ROS and PE as above    Vital Signs Last 24 Hrs  T(C): 36.7 (19 Jun 2018 11:03), Max: 36.7 (19 Jun 2018 11:03)  T(F): 98 (19 Jun 2018 11:03), Max: 98 (19 Jun 2018 11:03)  HR: 88 (19 Jun 2018 11:03) (87 - 88)  BP: 149/94 (19 Jun 2018 11:03) (130/79 - 149/94)  BP(mean): --  RR: 18 (19 Jun 2018 11:03) (18 - 19)  SpO2: 95% (19 Jun 2018 11:03) (95% - 97%)    1. ESRD on HD. MIssed 3 HD sessions.   Not in fluid overload, normal serum potassium   Plan for HD today  Dr. Hui notified     2. DM: f/u HBA1C  c/w Home doses of insulin Lantus and Humalog     3. Morbidly obese; BMI 40  4. Current smoker: smoking cessation counseling provided. On nicotine patch for now.     Plan of care discussed with patient and his mother at bedside. Agree with above   Patient seen and examined in ED. He is a 37 yo morbidly obese man, current smoker male  from home  PMhx HTN,  ESRD on HD, TTS, DM comes in for eval after sent in by dialysis center for missed dialysis for 1 week.   He says he was working, hence missed dialysis. denies SOB/edema etc, only complaint is that sometimes he has urinary incontinence. ROS negative for fever/chills/headache/dizziness/abdo pain, N/V/D/ leg swelling/PIMENTEL etc  He is a current active smoker 1 pack/3 days since age 17, social drinker, Surgical Hx: Left arm AV graft, abdo hernia repair, laproscopic, Fhx: unable to recollect.   In ED patient is sitting on bed comfortably, not in fluid overload   No CBC  was drown secondary to patient being a difficult stick     ROS and PE as above    Vital Signs Last 24 Hrs  T(C): 36.7 (19 Jun 2018 11:03), Max: 36.7 (19 Jun 2018 11:03)  T(F): 98 (19 Jun 2018 11:03), Max: 98 (19 Jun 2018 11:03)  HR: 88 (19 Jun 2018 11:03) (87 - 88)  BP: 149/94 (19 Jun 2018 11:03) (130/79 - 149/94)  BP(mean): --  RR: 18 (19 Jun 2018 11:03) (18 - 19)  SpO2: 95% (19 Jun 2018 11:03) (95% - 97%)    1. ESRD on HD. MIssed 3 HD sessions.   Not in fluid overload, normal serum potassium   Plan for HD today  Repeat all labs during HD   Dr. Hui notified     2. DM: f/u HBA1C  c/w Home doses of insulin Lantus and Humalog     3. Morbidly obese; BMI 40  4. Current smoker: smoking cessation counseling provided. On nicotine patch for now.     Plan of care discussed with patient and his mother at bedside.

## 2018-06-19 NOTE — H&P ADULT - NEGATIVE MUSCULOSKELETAL SYMPTOMS
no stiffness/no arm pain L/no leg pain R/no arthritis/no myalgia/no muscle cramps/no arthralgia/no neck pain/no joint swelling/no muscle weakness/no back pain/no leg pain L/no arm pain R

## 2018-06-19 NOTE — H&P ADULT - ASSESSMENT
38/M from home, works 2 jobs, PMhx of ESRD on HD, TTS, DM, HTN, current active smoker, morbidly obese comes in for eval after sent in by dialysis center for missed dialysis for 1 week.     stable vitals in the ED, no acute resp distress, comfortable, unable to get a CBC as pt is a very hard stick, labs to be drawn intra-dialysis, Chem: Bicarb of 13, BUN/Cr: 61/7/73, CXR: question slight CHF. requested Nephro Consult Dr La for urgent HD

## 2018-06-19 NOTE — ED ADULT NURSE NOTE - ED STAT RN HANDOFF DETAILS 5
Patient admitted to medicine to 5S report given to DANIEL Roth. Patient awaiting transportation via wheelchair stable in no acute distress safety maintained.

## 2018-06-19 NOTE — H&P ADULT - NEGATIVE GENERAL GENITOURINARY SYMPTOMS
normal urinary frequency/no hematuria/no flank pain L/no urine discoloration/no dysuria/normal libido/no gas in urine/no flank pain R/no bladder infections/no renal colic/no urinary hesitancy/no nocturia

## 2018-06-19 NOTE — CHART NOTE - NSCHARTNOTEFT_GEN_A_CORE
Was paged by RN about patient being agitated and anxious.  Patient reported of feeling anxious and having hallucinations.  He demanded his home medications to be given right away, which included Mirtazapine 45 daily and Seroquel 100 daily.  Both medications were restarted .  Will monitor his mental status overnight.

## 2018-06-19 NOTE — CONSULT NOTE ADULT - SUBJECTIVE AND OBJECTIVE BOX
38 yr old male with ESRD on HD TTS at [facility]. As per him, works two jobs and thus missed HD for a week. was asked to come to ED for evaluation. labs showed BUN/SCR  61/7.73. BICARB OF 13 AND k+ OF 5.3. Renal consulted as pt on HD  Seen on HD sleepy, did not answer questions.    PAST MEDICAL & SURGICAL HISTORY:  ESRD on dialysis  Morbid obesity with BMI of 40.0-44.9, adult  Diabetes  H/O hernia repair    aspirin (Swelling)  penicillins (Swelling)  shellfish (Unknown)    Home Medications Reviewed  Hospital Medications:   MEDICATIONS  (STANDING):  cinacalcet 30 milliGRAM(s) Oral daily  furosemide    Tablet 80 milliGRAM(s) Oral daily  gabapentin 400 milliGRAM(s) Oral two times a day  insulin glargine Injectable (LANTUS) 30 Unit(s) SubCutaneous at bedtime  insulin lispro (HumaLOG) corrective regimen sliding scale   SubCutaneous three times a day before meals  insulin lispro Injectable (HumaLOG) 5 Unit(s) SubCutaneous three times a day before meals  multivitamin 1 Tablet(s) Oral daily  nicotine -  14 mG/24Hr(s) Patch 1 patch Transdermal daily  pantoprazole    Tablet 40 milliGRAM(s) Oral before breakfast  sevelamer hydrochloride 1600 milliGRAM(s) Oral three times a day  simvastatin 40 milliGRAM(s) Oral at bedtime    SOCIAL HISTORY:  Denies ETOh,Smoking,   FAMILY HISTORY:        VITALS:  T(F): 98.1 (18 @ 14:05), Max: 98.1 (18 @ 14:05)  HR: 83 (18 @ 14:05)  BP: 135/93 (18 @ 14:05)  RR: 18 (18 @ 14:05)  SpO2: 96% (18 @ 14:05)  Wt(kg): --    Height (cm): 200.66 ( @ 07:10)  Weight (kg): 163.3 ( @ 07:10)  BMI (kg/m2): 40.6 ( @ 07:10)  BSA (m2): 2.93 ( @ 07:10)  PHYSICAL EXAM:  Constitutional: NAD  HEENT: anicteric sclera, oropharynx clear, MMM  Neck: No JVD  Respiratory: bilat  rales+.  Cardiovascular: S1, S2, RRR  Gastrointestinal: BS+, soft, NT/ND  Extremities: No cyanosis or clubbing.  peripheral edema+  Neurological: A/O x 3, no focal deficits  Vascular Access: LUEAVF, cannulated    LABS:      140  |  110<H>  |  62<H>  ----------------------------<  215<H>  4.3   |  19<L>  |  7.81<H>    Ca    8.3<L>      2018 14:16  Phos  5.3       Mg     2.0         TPro  7.1  /  Alb  2.7<L>  /  TBili  0.2  /  DBili      /  AST  10  /  ALT  17  /  AlkPhos  140<H>      Creatinine Trend: 7.81 <--, 7.73 <--                        12.3   11.6  )-----------( 324      ( 2018 14:16 )             38.9     Urine Studies:  Urinalysis Basic - ( 2018 09:47 )    Color: Pale Yellow / Appearance: Clear / S.010 / pH:   Gluc:  / Ketone: Negative  / Bili: Negative / Urobili: Negative   Blood:  / Protein: 500 mg/dL / Nitrite: Negative   Leuk Esterase: Negative / RBC: 0-2 /HPF / WBC 0-2 /HPF   Sq Epi:  / Non Sq Epi: Occasional /HPF / Bacteria: Trace /HPF        RADIOLOGY & ADDITIONAL STUDIES:

## 2018-06-19 NOTE — H&P ADULT - PROBLEM SELECTOR PLAN 1
Patient missed HD for 1 week, in metabolic acidosis however not hyper K and volume overloaded/resp distress at this time  will need urgent HD, with Dr La  c/w sensipar/renagel/NephroVite/Renal diet

## 2018-06-19 NOTE — H&P ADULT - NEGATIVE SKIN SYMPTOMS
no rash/no brittle nails/no dryness/no change in size/color of mole/no pitted nails/no hair loss/no itching/no tumor

## 2018-06-19 NOTE — ED PROVIDER NOTE - PROGRESS NOTE DETAILS
no hyper k, will admit for dialysis and continued w/u Given pt is no hyperglycemic --- anion gap in nml range, co2 21, and blood sugar in nml range. Suspect confusion is related to metabolic encephalopathy. No s/s of sepsis; not meeting sepsis criteria; no findings of infection yet. pt now awake alert, feeling better. resident to call nephro.

## 2018-06-19 NOTE — H&P ADULT - NEGATIVE ENMT SYMPTOMS
no sinus symptoms/no gum bleeding/no throat pain/no tinnitus/no nasal discharge/no recurrent cold sores/no dry mouth/no nasal congestion/no nasal obstruction/no post-nasal discharge/no nose bleeds/no abnormal taste sensation/no dysphagia/no ear pain/no hearing difficulty/no vertigo

## 2018-06-19 NOTE — H&P ADULT - NEGATIVE OPHTHALMOLOGIC SYMPTOMS
no pain R/no loss of vision L/no scleral injection L/no blurred vision L/no irritation R/no blurred vision R/no discharge R/no pain L/no irritation L/no loss of vision R/no diplopia/no photophobia/no lacrimation L/no lacrimation R/no discharge L/no scleral injection R

## 2018-06-19 NOTE — ED ADULT NURSE NOTE - ED STAT RN HANDOFF DETAILS 2
Received patient from DANIEL Smith A&HATTIE3 no c/o pain at present time. Admitted to medicine brought to holding without any IV access, as per RN Dr. Tucker in main ED was aware, will try ti insert IV access. Patient with left upper arm AV graft positive for bruit and thrill, pink band on hand. Patient resting comfortable eating no bed continue to monitor patient.

## 2018-06-19 NOTE — H&P ADULT - NEGATIVE CARDIOVASCULAR SYMPTOMS
no dyspnea on exertion/no orthopnea/no peripheral edema/no claudication/no chest pain/no paroxysmal nocturnal dyspnea/no palpitations

## 2018-06-19 NOTE — H&P ADULT - HISTORY OF PRESENT ILLNESS
38/M from home, works 2 jobs, PMhx of ESRD on HD, TTS, DM, HTN, current active smoker, morbidly obese comes in for eval after sent in by dialysis center for missed dialysis for 1 week. he says he was working, hence missed dialysis. denies SOB/edema etc, only complaint is that sometimes he has urinary incontinence. ROS negative for fever/chills/headache/dizziness/abdo pain, N/V/D/ leg swelling/PIMENTEL etc  He is a current active smoker 1 pack/3 days since age 17, social drinker, Surgical Hx: Left arm AV graft, abdo hernia repair, laproscopic, Fhx: unable to recollect, Allergic to penicillin and shell fish  stable vitals in the ED, no acute resp distress, comfortable, unable to get a CBC as pt is a very hard stick, labs to be drawn intra-dialysis, Chem: Bicarb of 13, BUN/Cr: 61/7/73, CXR: question slight CHF. requested Nephro Consult Dr La for urgent HD 38/M from home, works 2 jobs, PMhx of ESRD on HD, TTS, DM, HTN, current active smoker, morbidly obese comes in for eval after sent in by dialysis center for missed dialysis for 1 week.   he says he was working, hence missed dialysis. denies SOB/edema etc, only complaint is that sometimes he has urinary incontinence. ROS negative for fever/chills/headache/dizziness/abdo pain, N/V/D/ leg swelling/PIMENTEL etc  He is a current active smoker 1 pack/3 days since age 17, social drinker, Surgical Hx: Left arm AV graft, abdo hernia repair, laproscopic, Fhx: unable to recollect, Allergic to penicillin and shell fish  stable vitals in the ED, no acute resp distress, comfortable, unable to get a CBC as pt is a very hard stick, labs to be drawn intra-dialysis, Chem: Bicarb of 13, BUN/Cr: 61/7/73, CXR: question slight CHF. requested Nephro Consult Dr La for urgent HD 38/M from home, works 2 jobs, PMhx of ESRD on HD, TTS, DM, HTN, current active smoker, morbidly obese comes in for eval after sent in by dialysis center for missed dialysis for 1 week.   he says he was working, hence missed dialysis. denies SOB/edema etc, only complaint is that sometimes he has urinary incontinence. ROS negative for fever/chills/headache/dizziness/abdo pain, N/V/D/ leg swelling/PIMENTEL etc  He is a current active smoker 1 pack/3 days since age 17, social drinker, Surgical Hx: Left arm AV graft, abdo hernia repair, laproscopic, Fhx: unable to recollect, Allergic to penicillin and shell fish

## 2018-06-19 NOTE — H&P ADULT - NEGATIVE GASTROINTESTINAL SYMPTOMS
no melena/no jaundice/no constipation/no abdominal pain/no vomiting/no diarrhea/no nausea/no hematochezia/no steatorrhea/no hiccoughs/no change in bowel habits/no flatulence

## 2018-06-19 NOTE — H&P ADULT - NEGATIVE GENERAL SYMPTOMS
no weight loss/no polydipsia/no fatigue/no sweating/no malaise/no chills/no anorexia/no weight gain/no polyphagia/no polyuria/no fever

## 2018-06-19 NOTE — CONSULT NOTE ADULT - ASSESSMENT
38 yr old male with ESRD on HD TTS at [facility]. As per him, works two jobs and thus missed HD for a week. was asked to come to ED for evaluation. labs showed BUN/SCR  61/7.73. BICARB OF 13 AND k+ OF 5.3. Renal consulted as pt on HD  Seen on HD sleepy, did not answer questions.    rec's:  HD today  HGB stable off SIOBHAN  cont lasix  cont sevelamer

## 2018-06-20 VITALS
HEART RATE: 94 BPM | DIASTOLIC BLOOD PRESSURE: 73 MMHG | OXYGEN SATURATION: 98 % | TEMPERATURE: 98 F | SYSTOLIC BLOOD PRESSURE: 138 MMHG | RESPIRATION RATE: 18 BRPM

## 2018-06-20 LAB
24R-OH-CALCIDIOL SERPL-MCNC: 21.9 NG/ML — LOW (ref 30–80)
ALBUMIN SERPL ELPH-MCNC: 2.8 G/DL — LOW (ref 3.5–5)
ALP SERPL-CCNC: 137 U/L — HIGH (ref 40–120)
ALT FLD-CCNC: 17 U/L DA — SIGNIFICANT CHANGE UP (ref 10–60)
ANION GAP SERPL CALC-SCNC: 9 MMOL/L — SIGNIFICANT CHANGE UP (ref 5–17)
APTT BLD: 33.9 SEC — SIGNIFICANT CHANGE UP (ref 27.5–37.4)
AST SERPL-CCNC: 9 U/L — LOW (ref 10–40)
BASOPHILS # BLD AUTO: 0.1 K/UL — SIGNIFICANT CHANGE UP (ref 0–0.2)
BASOPHILS NFR BLD AUTO: 0.8 % — SIGNIFICANT CHANGE UP (ref 0–2)
BILIRUB SERPL-MCNC: 0.3 MG/DL — SIGNIFICANT CHANGE UP (ref 0.2–1.2)
BUN SERPL-MCNC: 49 MG/DL — HIGH (ref 7–18)
CALCIUM SERPL-MCNC: 8.1 MG/DL — LOW (ref 8.4–10.5)
CALCIUM SERPL-MCNC: 8.4 MG/DL — SIGNIFICANT CHANGE UP (ref 8.4–10.5)
CHLORIDE SERPL-SCNC: 105 MMOL/L — SIGNIFICANT CHANGE UP (ref 96–108)
CHOLEST SERPL-MCNC: 148 MG/DL — SIGNIFICANT CHANGE UP (ref 10–199)
CO2 SERPL-SCNC: 24 MMOL/L — SIGNIFICANT CHANGE UP (ref 22–31)
CREAT SERPL-MCNC: 6.18 MG/DL — HIGH (ref 0.5–1.3)
CULTURE RESULTS: NO GROWTH — SIGNIFICANT CHANGE UP
EOSINOPHIL # BLD AUTO: 0.4 K/UL — SIGNIFICANT CHANGE UP (ref 0–0.5)
EOSINOPHIL NFR BLD AUTO: 3.6 % — SIGNIFICANT CHANGE UP (ref 0–6)
GLUCOSE SERPL-MCNC: 171 MG/DL — HIGH (ref 70–99)
HBA1C BLD-MCNC: 7.5 % — HIGH (ref 4–5.6)
HCT VFR BLD CALC: 39.1 % — SIGNIFICANT CHANGE UP (ref 39–50)
HDLC SERPL-MCNC: 45 MG/DL — SIGNIFICANT CHANGE UP (ref 40–125)
HGB BLD-MCNC: 12.7 G/DL — LOW (ref 13–17)
INR BLD: 0.98 RATIO — SIGNIFICANT CHANGE UP (ref 0.88–1.16)
LIPID PNL WITH DIRECT LDL SERPL: 70 MG/DL — SIGNIFICANT CHANGE UP
LYMPHOCYTES # BLD AUTO: 1.7 K/UL — SIGNIFICANT CHANGE UP (ref 1–3.3)
LYMPHOCYTES # BLD AUTO: 15.6 % — SIGNIFICANT CHANGE UP (ref 13–44)
MAGNESIUM SERPL-MCNC: 1.9 MG/DL — SIGNIFICANT CHANGE UP (ref 1.6–2.6)
MCHC RBC-ENTMCNC: 31.2 PG — SIGNIFICANT CHANGE UP (ref 27–34)
MCHC RBC-ENTMCNC: 32.6 GM/DL — SIGNIFICANT CHANGE UP (ref 32–36)
MCV RBC AUTO: 96 FL — SIGNIFICANT CHANGE UP (ref 80–100)
MONOCYTES # BLD AUTO: 0.6 K/UL — SIGNIFICANT CHANGE UP (ref 0–0.9)
MONOCYTES NFR BLD AUTO: 5.2 % — SIGNIFICANT CHANGE UP (ref 2–14)
NEUTROPHILS # BLD AUTO: 8.2 K/UL — HIGH (ref 1.8–7.4)
NEUTROPHILS NFR BLD AUTO: 74.8 % — SIGNIFICANT CHANGE UP (ref 43–77)
PHOSPHATE SERPL-MCNC: 4.9 MG/DL — HIGH (ref 2.5–4.5)
PLATELET # BLD AUTO: 316 K/UL — SIGNIFICANT CHANGE UP (ref 150–400)
POTASSIUM SERPL-MCNC: 4.3 MMOL/L — SIGNIFICANT CHANGE UP (ref 3.5–5.3)
POTASSIUM SERPL-SCNC: 4.3 MMOL/L — SIGNIFICANT CHANGE UP (ref 3.5–5.3)
PROT SERPL-MCNC: 7.4 G/DL — SIGNIFICANT CHANGE UP (ref 6–8.3)
PROTHROM AB SERPL-ACNC: 10.7 SEC — SIGNIFICANT CHANGE UP (ref 9.8–12.7)
PTH-INTACT FLD-MCNC: 569 PG/ML — HIGH (ref 15–65)
RBC # BLD: 4.07 M/UL — LOW (ref 4.2–5.8)
RBC # FLD: 14.2 % — SIGNIFICANT CHANGE UP (ref 10.3–14.5)
SODIUM SERPL-SCNC: 138 MMOL/L — SIGNIFICANT CHANGE UP (ref 135–145)
SPECIMEN SOURCE: SIGNIFICANT CHANGE UP
TOTAL CHOLESTEROL/HDL RATIO MEASUREMENT: 3.3 RATIO — LOW (ref 3.4–9.6)
TRIGL SERPL-MCNC: 164 MG/DL — HIGH (ref 10–149)
TSH SERPL-MCNC: 1.53 UU/ML — SIGNIFICANT CHANGE UP (ref 0.34–4.82)
VIT B12 SERPL-MCNC: 908 PG/ML — SIGNIFICANT CHANGE UP (ref 232–1245)
WBC # BLD: 10.9 K/UL — HIGH (ref 3.8–10.5)
WBC # FLD AUTO: 10.9 K/UL — HIGH (ref 3.8–10.5)

## 2018-06-20 PROCEDURE — 84443 ASSAY THYROID STIM HORMONE: CPT

## 2018-06-20 PROCEDURE — 83735 ASSAY OF MAGNESIUM: CPT

## 2018-06-20 PROCEDURE — 82310 ASSAY OF CALCIUM: CPT

## 2018-06-20 PROCEDURE — 80053 COMPREHEN METABOLIC PANEL: CPT

## 2018-06-20 PROCEDURE — 81001 URINALYSIS AUTO W/SCOPE: CPT

## 2018-06-20 PROCEDURE — 82010 KETONE BODYS QUAN: CPT

## 2018-06-20 PROCEDURE — 93005 ELECTROCARDIOGRAM TRACING: CPT

## 2018-06-20 PROCEDURE — 83036 HEMOGLOBIN GLYCOSYLATED A1C: CPT

## 2018-06-20 PROCEDURE — 82607 VITAMIN B-12: CPT

## 2018-06-20 PROCEDURE — 84100 ASSAY OF PHOSPHORUS: CPT

## 2018-06-20 PROCEDURE — 82009 KETONE BODYS QUAL: CPT

## 2018-06-20 PROCEDURE — 99285 EMERGENCY DEPT VISIT HI MDM: CPT | Mod: 25

## 2018-06-20 PROCEDURE — 80061 LIPID PANEL: CPT

## 2018-06-20 PROCEDURE — 83970 ASSAY OF PARATHORMONE: CPT

## 2018-06-20 PROCEDURE — 82306 VITAMIN D 25 HYDROXY: CPT

## 2018-06-20 PROCEDURE — 99261: CPT

## 2018-06-20 PROCEDURE — 85730 THROMBOPLASTIN TIME PARTIAL: CPT

## 2018-06-20 PROCEDURE — 85610 PROTHROMBIN TIME: CPT

## 2018-06-20 PROCEDURE — 82962 GLUCOSE BLOOD TEST: CPT

## 2018-06-20 PROCEDURE — 87086 URINE CULTURE/COLONY COUNT: CPT

## 2018-06-20 PROCEDURE — 99239 HOSP IP/OBS DSCHRG MGMT >30: CPT

## 2018-06-20 PROCEDURE — 85027 COMPLETE CBC AUTOMATED: CPT

## 2018-06-20 PROCEDURE — 71045 X-RAY EXAM CHEST 1 VIEW: CPT

## 2018-06-20 RX ORDER — MIRTAZAPINE 45 MG/1
1 TABLET, ORALLY DISINTEGRATING ORAL
Qty: 30 | Refills: 0 | OUTPATIENT
Start: 2018-06-20 | End: 2018-07-19

## 2018-06-20 RX ORDER — ACETAMINOPHEN 500 MG
2 TABLET ORAL
Qty: 0 | Refills: 0 | DISCHARGE
Start: 2018-06-20

## 2018-06-20 RX ORDER — NYSTATIN 500MM UNIT
500000 POWDER (EA) MISCELLANEOUS
Qty: 0 | Refills: 0 | Status: DISCONTINUED | OUTPATIENT
Start: 2018-06-20 | End: 2018-06-20

## 2018-06-20 RX ORDER — ACETAMINOPHEN 500 MG
650 TABLET ORAL EVERY 6 HOURS
Qty: 0 | Refills: 0 | Status: DISCONTINUED | OUTPATIENT
Start: 2018-06-20 | End: 2018-06-20

## 2018-06-20 RX ORDER — QUETIAPINE FUMARATE 200 MG/1
1 TABLET, FILM COATED ORAL
Qty: 30 | Refills: 0 | OUTPATIENT
Start: 2018-06-20 | End: 2018-07-19

## 2018-06-20 RX ORDER — HALOPERIDOL DECANOATE 100 MG/ML
1 INJECTION INTRAMUSCULAR
Qty: 0 | Refills: 0 | COMMUNITY

## 2018-06-20 RX ORDER — NICOTINE POLACRILEX 2 MG
1 GUM BUCCAL
Qty: 30 | Refills: 0 | OUTPATIENT
Start: 2018-06-20 | End: 2018-07-19

## 2018-06-20 RX ORDER — QUETIAPINE FUMARATE 200 MG/1
2 TABLET, FILM COATED ORAL
Qty: 0 | Refills: 0 | COMMUNITY
Start: 2018-06-20 | End: 2018-07-19

## 2018-06-20 RX ADMIN — GABAPENTIN 400 MILLIGRAM(S): 400 CAPSULE ORAL at 05:41

## 2018-06-20 RX ADMIN — Medication 1: at 12:49

## 2018-06-20 RX ADMIN — Medication 1 TABLET(S): at 12:49

## 2018-06-20 RX ADMIN — Medication 5 UNIT(S): at 12:49

## 2018-06-20 RX ADMIN — QUETIAPINE FUMARATE 100 MILLIGRAM(S): 200 TABLET, FILM COATED ORAL at 12:49

## 2018-06-20 RX ADMIN — Medication 650 MILLIGRAM(S): at 12:50

## 2018-06-20 RX ADMIN — Medication 1 PATCH: at 12:49

## 2018-06-20 RX ADMIN — GABAPENTIN 400 MILLIGRAM(S): 400 CAPSULE ORAL at 17:41

## 2018-06-20 RX ADMIN — Medication 650 MILLIGRAM(S): at 13:45

## 2018-06-20 RX ADMIN — PANTOPRAZOLE SODIUM 40 MILLIGRAM(S): 20 TABLET, DELAYED RELEASE ORAL at 05:41

## 2018-06-20 RX ADMIN — SEVELAMER CARBONATE 1600 MILLIGRAM(S): 2400 POWDER, FOR SUSPENSION ORAL at 05:41

## 2018-06-20 RX ADMIN — MIRTAZAPINE 45 MILLIGRAM(S): 45 TABLET, ORALLY DISINTEGRATING ORAL at 12:48

## 2018-06-20 RX ADMIN — Medication 5 UNIT(S): at 08:46

## 2018-06-20 RX ADMIN — Medication 1: at 08:46

## 2018-06-20 RX ADMIN — Medication 500000 UNIT(S): at 17:42

## 2018-06-20 RX ADMIN — CINACALCET 30 MILLIGRAM(S): 30 TABLET, FILM COATED ORAL at 12:48

## 2018-06-20 RX ADMIN — Medication 5 UNIT(S): at 17:42

## 2018-06-20 RX ADMIN — Medication 80 MILLIGRAM(S): at 05:40

## 2018-06-20 NOTE — DISCHARGE NOTE ADULT - PATIENT PORTAL LINK FT
You can access the [facility] Patient Portal, offered by [facility], by registering with the following website: [email or web address]

## 2018-06-20 NOTE — DISCHARGE NOTE ADULT - PLAN OF CARE
compliance with HD Please continue medications as instruction and continue dialysis on Tuesday , Thursday and Saturday. Please follow up with nephrologist within one week after discharge. keep Hemoglobin A1C <7 Your Hb A1C 8.2 on this admission . Please continue medications as instruction and Please follow up with endocrinologist  within one week after discharge. Recommended to follow up outpatient surgeon for possible gastric surgery due to BMP >40. Life style education given. please continue nicotine patch once a day and smoke cessation education given.

## 2018-06-20 NOTE — PROGRESS NOTE ADULT - PROBLEM SELECTOR PLAN 2
takes 20-40U Lantus QHS, will give 30U QHS, Humalog and HiSS  Check A1C takes 20-40U Lantus QHS, will give 30U QHS, Humalog and HiSS  A1C 8.2

## 2018-06-20 NOTE — DISCHARGE NOTE ADULT - MEDICATION SUMMARY - MEDICATIONS TO TAKE
I will START or STAY ON the medications listed below when I get home from the hospital:    Neurontin 400 mg oral capsule  -- 1 cap(s) by mouth 2 times a day  -- Indication: For ESRD on dialysis    mirtazapine 45 mg oral tablet  -- 1 tab(s) by mouth once a day  -- Indication: For Depression    Lantus 100 units/mL subcutaneous solution  -- 40 unit(s) subcutaneous once a day (at bedtime)  -- Indication: For Diabetes    NovoLOG 100 units/mL subcutaneous solution  -- 5 unit(s) subcutaneous 3 times a day  -- Indication: For Diabetes    simvastatin 40 mg oral tablet  -- 1 tab(s) by mouth once a day (at bedtime)  -- Indication: For HLD    QUEtiapine 100 mg oral tablet  -- 1 tab(s) by mouth once a day  -- Indication: For Depression    Sensipar 30 mg oral tablet  -- 1 tab(s) by mouth once a day  -- Indication: For ESRD on dialysis    Lasix 80 mg oral tablet  -- 1 tab(s) by mouth once a day  -- Indication: For HTN    methocarbamol 750 mg oral tablet  -- 1 tab(s) by mouth 3 times a week, As Needed  -- Indication: For PAIN    Renvela 800 mg oral tablet  -- 2 tab(s) by mouth 3 times a day (with meals)  -- Indication: For ESRD on dialysis    Velphoro 2500 mg (500 mg elemental iron) oral tablet, chewable  -- 1 tab(s) by mouth 3 times a day (with meals)  -- Indication: For ESRD on dialysis    omeprazole 20 mg oral delayed release capsule  -- 1 cap(s) by mouth once a day  -- Indication: For antiacid    nicotine 14 mg/24 hr transdermal film, extended release  -- 1 patch by transdermal patch once a day   -- Indication: For active smoker    Nephro-Oly oral tablet  -- 1 tab(s) by mouth once a day  -- Indication: For Supplements    ergocalciferol 50,000 intl units (1.25 mg) oral capsule  -- 1 cap(s) by mouth once a week  -- Indication: For Supplements I will START or STAY ON the medications listed below when I get home from the hospital:    acetaminophen 325 mg oral tablet  -- 2 tab(s) by mouth every 6 hours, As needed, Moderate Pain (4 - 6)  -- Indication: For Headache    Neurontin 400 mg oral capsule  -- 1 cap(s) by mouth 2 times a day  -- Indication: For ESRD on dialysis    mirtazapine 45 mg oral tablet  -- 1 tab(s) by mouth once a day  -- Indication: For Depression    Lantus 100 units/mL subcutaneous solution  -- 40 unit(s) subcutaneous once a day (at bedtime)  -- Indication: For Diabetes    NovoLOG 100 units/mL subcutaneous solution  -- 5 unit(s) subcutaneous 3 times a day  -- Indication: For Diabetes    simvastatin 40 mg oral tablet  -- 1 tab(s) by mouth once a day (at bedtime)  -- Indication: For HLD    QUEtiapine 100 mg oral tablet  -- 1 tab(s) by mouth once a day  -- Indication: For Depression    Sensipar 30 mg oral tablet  -- 1 tab(s) by mouth once a day  -- Indication: For ESRD on dialysis    Lasix 80 mg oral tablet  -- 1 tab(s) by mouth once a day  -- Indication: For HTN    methocarbamol 750 mg oral tablet  -- 1 tab(s) by mouth 3 times a week, As Needed  -- Indication: For PAIN    Renvela 800 mg oral tablet  -- 2 tab(s) by mouth 3 times a day (with meals)  -- Indication: For ESRD on dialysis    Velphoro 2500 mg (500 mg elemental iron) oral tablet, chewable  -- 1 tab(s) by mouth 3 times a day (with meals)  -- Indication: For ESRD on dialysis    omeprazole 20 mg oral delayed release capsule  -- 1 cap(s) by mouth once a day  -- Indication: For antiacid    nicotine 14 mg/24 hr transdermal film, extended release  -- 1 patch by transdermal patch once a day   -- Indication: For active smoker    Nephro-O[place] oral tablet  -- 1 tab(s) by mouth once a day  -- Indication: For Supplements    ergocalciferol 50,000 intl units (1.25 mg) oral capsule  -- 1 cap(s) by mouth once a week  -- Indication: For Supplements

## 2018-06-20 NOTE — PROGRESS NOTE ADULT - ATTENDING COMMENTS
Agree with above   Patient seen and examined at bedside.   Has no complains. Had HD yesterday with no adverse events   ROS and PE as above     Vital Signs Last 24 Hrs  T(C): 36.8 (20 Jun 2018 05:45), Max: 37.3 (19 Jun 2018 19:00)  T(F): 98.2 (20 Jun 2018 05:45), Max: 99.1 (19 Jun 2018 19:00)  HR: 89 (20 Jun 2018 05:45) (82 - 96)  BP: 170/90 (20 Jun 2018 05:45) (135/93 - 170/90)  BP(mean): --  RR: 19 (20 Jun 2018 05:45) (17 - 19)  SpO2: 95% (20 Jun 2018 05:45) (95% - 100%)    1. ESRD on HD: s/p HD yesterday   Not in fluid overload   Still makes urine and on Furosemide 80 mg PO daily   HD as outpatient resumed   Medically stable for discharge     2. HTN: BP stable   3. Morbidly obese   4. Current smoker: on nicotine patch   5. DM with diabetic neuropathy: BG controlled. HbA1C 7.5  On Lantus and Humalog     Medically stable for discharge. SW notified for outpatient HD setup.

## 2018-06-20 NOTE — PROGRESS NOTE ADULT - PROBLEM SELECTOR PLAN 1
Patient missed HD for 1 week, in metabolic acidosis however not hyper K and volume overloaded/resp distress at this time  will need urgent HD, with Dr La  c/w sensipar/renagel/NephroVite/Renal diet Patient missed HD for 1 week, in metabolic acidosis however not hyper K and volume overloaded/resp distress at this time  ESRD with HD on TTS   s/p urgent HD on 06/19  c/w sensipar/renagel/NephroVite/Renal diet  nephro consulted Dr La

## 2018-06-20 NOTE — PROGRESS NOTE ADULT - SUBJECTIVE AND OBJECTIVE BOX
s/p HD yesterday. feels better    aspirin (Swelling)  penicillins (Swelling)  shellfish (Unknown)    Hospital Medications:   MEDICATIONS  (STANDING):  cinacalcet 30 milliGRAM(s) Oral daily  furosemide    Tablet 80 milliGRAM(s) Oral daily  gabapentin 400 milliGRAM(s) Oral two times a day  insulin glargine Injectable (LANTUS) 30 Unit(s) SubCutaneous at bedtime  insulin lispro (HumaLOG) corrective regimen sliding scale   SubCutaneous three times a day before meals  insulin lispro Injectable (HumaLOG) 5 Unit(s) SubCutaneous three times a day before meals  mirtazapine 45 milliGRAM(s) Oral daily  multivitamin 1 Tablet(s) Oral daily  nicotine -  14 mG/24Hr(s) Patch 1 patch Transdermal daily  pantoprazole    Tablet 40 milliGRAM(s) Oral before breakfast  QUEtiapine 100 milliGRAM(s) Oral daily  sevelamer hydrochloride 1600 milliGRAM(s) Oral three times a day  simvastatin 40 milliGRAM(s) Oral at bedtime      VITALS:  T(F): 98.2 (18 @ 05:45), Max: 99.1 (18 @ 19:00)  HR: 89 (18 @ 05:45)  BP: 170/90 (18 @ 05:45)  RR: 19 (18 @ 05:45)  SpO2: 95% (18 @ 05:45)  Wt(kg): --     @ 07:01  -   @ 07:00  --------------------------------------------------------  IN: 0 mL / OUT: 1900 mL / NET: -1900 mL        PHYSICAL EXAM:  Constitutional: NAD  HEENT: anicteric sclera, oropharynx clear, MMM  Neck: No JVD  Respiratory: CTAB, no wheezes, rales or rhonchi  Cardiovascular: S1, S2, RRR  Gastrointestinal: BS+, soft, NT/ND  Extremities: No cyanosis or clubbing.  peripheral edema++  Neurological: A/O x 3, no focal deficits  Vascular Access: AVF with thrill and bruit    LABS:      138  |  105  |  49<H>  ----------------------------<  171<H>  4.3   |  24  |  6.18<H>    Ca    8.1<L>      2018 06:58  Phos  4.9       Mg     1.9         TPro  7.4  /  Alb  2.8<L>  /  TBili  0.3  /  DBili      /  AST  9<L>  /  ALT  17  /  AlkPhos  137<H>      Creatinine Trend: 6.18 <--, 7.81 <--, 7.73 <--                        12.7   10.9  )-----------( 316      ( 2018 06:58 )             39.1     Urine Studies:  Urinalysis Basic - ( 2018 09:47 )    Color: Pale Yellow / Appearance: Clear / S.010 / pH:   Gluc:  / Ketone: Negative  / Bili: Negative / Urobili: Negative   Blood:  / Protein: 500 mg/dL / Nitrite: Negative   Leuk Esterase: Negative / RBC: 0-2 /HPF / WBC 0-2 /HPF   Sq Epi:  / Non Sq Epi: Occasional /HPF / Bacteria: Trace /HPF        RADIOLOGY & ADDITIONAL STUDIES:

## 2018-06-20 NOTE — PROGRESS NOTE ADULT - SUBJECTIVE AND OBJECTIVE BOX
PGY1 Note discussed with supervising resident and primary attending.    Patient is a 38y old  Male who presents with a chief complaint of missed HD (2018 11:03)      INTERVAL HPI/OVERNIGHT EVENTS:    MEDICATIONS  (STANDING):  cinacalcet 30 milliGRAM(s) Oral daily  furosemide    Tablet 80 milliGRAM(s) Oral daily  gabapentin 400 milliGRAM(s) Oral two times a day  insulin glargine Injectable (LANTUS) 30 Unit(s) SubCutaneous at bedtime  insulin lispro (HumaLOG) corrective regimen sliding scale   SubCutaneous three times a day before meals  insulin lispro Injectable (HumaLOG) 5 Unit(s) SubCutaneous three times a day before meals  mirtazapine 45 milliGRAM(s) Oral daily  multivitamin 1 Tablet(s) Oral daily  nicotine -  14 mG/24Hr(s) Patch 1 patch Transdermal daily  pantoprazole    Tablet 40 milliGRAM(s) Oral before breakfast  QUEtiapine 100 milliGRAM(s) Oral daily  sevelamer hydrochloride 1600 milliGRAM(s) Oral three times a day  simvastatin 40 milliGRAM(s) Oral at bedtime    MEDICATIONS  (PRN):      Allergies    aspirin (Swelling)  penicillins (Swelling)  shellfish (Unknown)    Intolerances        REVIEW OF SYSTEMS:  CONSTITUTIONAL: No fever, weight loss, or fatigue  RESPIRATORY: No cough, wheezing, chills or hemoptysis; No shortness of breath  CARDIOVASCULAR: No chest pain, palpitations, dizziness, or leg swelling  GASTROINTESTINAL: No abdominal or epigastric pain. No nausea, vomiting, or hematemesis; No diarrhea or constipation. No melena or hematochezia.  NEUROLOGICAL: No headaches, memory loss, loss of strength, numbness, or tremors  SKIN: No itching, burning, rashes, or lesions     Vital Signs Last 24 Hrs  T(C): 36.8 (2018 05:45), Max: 37.3 (2018 19:00)  T(F): 98.2 (2018 05:45), Max: 99.1 (2018 19:00)  HR: 89 (2018 05:45) (82 - 96)  BP: 170/90 (2018 05:45) (130/79 - 170/90)  BP(mean): --  RR: 19 (2018 05:45) (17 - 19)  SpO2: 95% (2018 05:45) (95% - 100%)    PHYSICAL EXAM:  GENERAL: NAD, well-groomed, well-developed  HEAD:  Atraumatic, Normocephalic  EYES: EOMI, PERRLA, conjunctiva and sclera clear  NECK: Supple, No JVD, Normal thyroid  CHEST/LUNG: Clear to percussion bilaterally; No rales, rhonchi, wheezing, or rubs  HEART: Regular rate and rhythm; No murmurs, rubs, or gallops  ABDOMEN: Soft, Nontender, Nondistended; Bowel sounds present  NERVOUS SYSTEM:  Alert & Oriented X3, Good concentration; Motor Strength 5/5 B/L   EXTREMITIES:  2+ Peripheral Pulses, No clubbing, cyanosis, or edema  SKIN;    LABS:                        12.3   11.6  )-----------( 324      ( 2018 14:16 )             38.9     06-19    140  |  110<H>  |  62<H>  ----------------------------<  215<H>  4.3   |  19<L>  |  7.81<H>    Ca    8.3<L>      2018 14:16  Phos  5.3     -  Mg     2.0     -    TPro  7.1  /  Alb  2.7<L>  /  TBili  0.2  /  DBili  x   /  AST  10  /  ALT  17  /  AlkPhos  140<H>  06-19    PT/INR - ( 2018 14:16 )   PT: 10.7 sec;   INR: 0.98 ratio         PTT - ( 2018 14:16 )  PTT:33.7 sec  Urinalysis Basic - ( 2018 09:47 )    Color: Pale Yellow / Appearance: Clear / S.010 / pH: x  Gluc: x / Ketone: Negative  / Bili: Negative / Urobili: Negative   Blood: x / Protein: 500 mg/dL / Nitrite: Negative   Leuk Esterase: Negative / RBC: 0-2 /HPF / WBC 0-2 /HPF   Sq Epi: x / Non Sq Epi: Occasional /HPF / Bacteria: Trace /HPF      CAPILLARY BLOOD GLUCOSE      POCT Blood Glucose.: 210 mg/dL (2018 21:33)  POCT Blood Glucose.: 124 mg/dL (2018 18:10)  POCT Blood Glucose.: 145 mg/dL (2018 07:38)      RADIOLOGY & ADDITIONAL TESTS:    Imaging Personally Reviewed:  [ ] YES  [ ] NO    Consultant(s) Notes Reviewed:  [ ] YES  [ ] NO PGY1 Note discussed with supervising resident and primary attending.    Patient is a 38y old  Male who presents with a chief complaint of missed HD (2018 11:03)      INTERVAL HPI/OVERNIGHT EVENTS: no new overnight events, Pt has obesity , BMI >40, s/p HD .    MEDICATIONS  (STANDING):  cinacalcet 30 milliGRAM(s) Oral daily  furosemide    Tablet 80 milliGRAM(s) Oral daily  gabapentin 400 milliGRAM(s) Oral two times a day  insulin glargine Injectable (LANTUS) 30 Unit(s) SubCutaneous at bedtime  insulin lispro (HumaLOG) corrective regimen sliding scale   SubCutaneous three times a day before meals  insulin lispro Injectable (HumaLOG) 5 Unit(s) SubCutaneous three times a day before meals  mirtazapine 45 milliGRAM(s) Oral daily  multivitamin 1 Tablet(s) Oral daily  nicotine -  14 mG/24Hr(s) Patch 1 patch Transdermal daily  pantoprazole    Tablet 40 milliGRAM(s) Oral before breakfast  QUEtiapine 100 milliGRAM(s) Oral daily  sevelamer hydrochloride 1600 milliGRAM(s) Oral three times a day  simvastatin 40 milliGRAM(s) Oral at bedtime    MEDICATIONS  (PRN):      Allergies    aspirin (Swelling)  penicillins (Swelling)  shellfish (Unknown)    Intolerances        REVIEW OF SYSTEMS:  CONSTITUTIONAL: obese   RESPIRATORY: No cough, wheezing, chills or hemoptysis; No shortness of breath  CARDIOVASCULAR: No chest pain, palpitations, dizziness, or leg swelling  GASTROINTESTINAL: No abdominal or epigastric pain. No nausea, vomiting, or hematemesis;   NEUROLOGICAL: No headaches, memory loss, loss of strength, numbness, or tremors  SKIN: No itching, burning, rashes, or lesions     Vital Signs Last 24 Hrs  T(C): 36.8 (2018 05:45), Max: 37.3 (2018 19:00)  T(F): 98.2 (2018 05:45), Max: 99.1 (2018 19:00)  HR: 89 (2018 05:45) (82 - 96)  BP: 170/90 (2018 05:45) (130/79 - 170/90)  BP(mean): --  RR: 19 (2018 05:45) (17 - 19)  SpO2: 95% (2018 05:45) (95% - 100%)    PHYSICAL EXAM:  GENERAL: NAD, well-groomed, well-developed  CHEST/LUNG: Clear to percussion bilaterally; No rales, rhonchi, wheezing, or rubs  HEART: Regular rate and rhythm; No murmurs, rubs, or gallops  ABDOMEN: Soft, Nontender, Nondistended; Bowel sounds present  NERVOUS SYSTEM:  Alert & Oriented X3, Good concentration; Motor Strength 5/5 B/L   EXTREMITIES:  2+ Peripheral Pulses, No clubbing, cyanosis, or edema    LABS:                        12.3   11.6  )-----------( 324      ( 2018 14:16 )             38.9     -    140  |  110<H>  |  62<H>  ----------------------------<  215<H>  4.3   |  19<L>  |  7.81<H>    Ca    8.3<L>      2018 14:16  Phos  5.3       Mg     2.0         TPro  7.1  /  Alb  2.7<L>  /  TBili  0.2  /  DBili  x   /  AST  10  /  ALT  17  /  AlkPhos  140<H>  -    PT/INR - ( 2018 14:16 )   PT: 10.7 sec;   INR: 0.98 ratio         PTT - ( 2018 14:16 )  PTT:33.7 sec  Urinalysis Basic - ( 2018 09:47 )    Color: Pale Yellow / Appearance: Clear / S.010 / pH: x  Gluc: x / Ketone: Negative  / Bili: Negative / Urobili: Negative   Blood: x / Protein: 500 mg/dL / Nitrite: Negative   Leuk Esterase: Negative / RBC: 0-2 /HPF / WBC 0-2 /HPF   Sq Epi: x / Non Sq Epi: Occasional /HPF / Bacteria: Trace /HPF      CAPILLARY BLOOD GLUCOSE      POCT Blood Glucose.: 210 mg/dL (2018 21:33)  POCT Blood Glucose.: 124 mg/dL (2018 18:10)  POCT Blood Glucose.: 145 mg/dL (2018 07:38)      RADIOLOGY & ADDITIONAL TESTS:    Imaging Personally Reviewed:  [ x] YES  [ ] NO    Consultant(s) Notes Reviewed:  [x ] YES  [ ] NO

## 2018-06-20 NOTE — PROGRESS NOTE ADULT - ASSESSMENT
38 yr old male with ESRD on HD TTS at [facility]. As per him, works two jobs and thus missed HD for a week. was asked to come to ED for evaluation. s/p HD yesterday    rec's:  S/P HD yesterday.  D/C planning

## 2018-06-20 NOTE — DISCHARGE NOTE ADULT - HOSPITAL COURSE
38 male from home, works 2 jobs, PMhx of ESRD on HD, TTS, DM, HTN, current active smoker, morbidly obese comes in for eval after sent in by dialysis center for missed dialysis for 1 week. stable vitals in the ED, no acute resp distress, comfortable, unable to get a CBC as pt is a very hard stick, labs to be drawn intra-dialysis, Chem: Bicarb of 13, BUN/Cr: 61/7/73, CXR: question slight CHF. s/p  HD on 06/19, c/w  sensipar/renagel/NephroVite/Renal diet, nephro consulted Dr La. For history of DM, continue home dose of insulin. A1C 8.2 this time. Pt is active smoker, got nicotine patch, smoke cessetin education given.  consulted for reinstate dialysis center.    Pt is stable to be discharge to home today as per attending and will continue HD on 06/21.

## 2018-06-20 NOTE — DISCHARGE NOTE ADULT - CARE PROVIDER_API CALL
Isac La), Internal Medicine; Nephrology  [facility] [place] [address]  Phone: [phone]  Fax: [phone]

## 2018-11-12 NOTE — ED ADULT NURSE NOTE - PMH
Add Option For Additional Mediation: No Diabetes    ESRD on dialysis    Morbid obesity with BMI of 40.0-44.9, adult

## 2018-11-20 ENCOUNTER — INPATIENT (INPATIENT)
Facility: HOSPITAL | Age: 38
LOS: 1 days | Discharge: HOME | DRG: 640 | End: 2018-11-22
Attending: GENERAL PRACTICE | Admitting: GENERAL PRACTICE
Payer: MEDICARE

## 2018-11-20 VITALS
TEMPERATURE: 98 F | RESPIRATION RATE: 20 BRPM | OXYGEN SATURATION: 99 % | SYSTOLIC BLOOD PRESSURE: 144 MMHG | HEIGHT: 78 IN | DIASTOLIC BLOOD PRESSURE: 103 MMHG | HEART RATE: 84 BPM | WEIGHT: 315 LBS

## 2018-11-20 DIAGNOSIS — E11.9 TYPE 2 DIABETES MELLITUS WITHOUT COMPLICATIONS: ICD-10-CM

## 2018-11-20 DIAGNOSIS — E87.5 HYPERKALEMIA: ICD-10-CM

## 2018-11-20 DIAGNOSIS — Z98.89 OTHER SPECIFIED POSTPROCEDURAL STATES: Chronic | ICD-10-CM

## 2018-11-20 DIAGNOSIS — M54.9 DORSALGIA, UNSPECIFIED: ICD-10-CM

## 2018-11-20 DIAGNOSIS — Z29.9 ENCOUNTER FOR PROPHYLACTIC MEASURES, UNSPECIFIED: ICD-10-CM

## 2018-11-20 DIAGNOSIS — N18.6 END STAGE RENAL DISEASE: ICD-10-CM

## 2018-11-20 LAB
ALBUMIN SERPL ELPH-MCNC: 2.9 G/DL — LOW (ref 3.5–5)
ALBUMIN SERPL ELPH-MCNC: 2.9 G/DL — LOW (ref 3.5–5)
ALP SERPL-CCNC: 110 U/L — SIGNIFICANT CHANGE UP (ref 40–120)
ALP SERPL-CCNC: 115 U/L — SIGNIFICANT CHANGE UP (ref 40–120)
ALT FLD-CCNC: 21 U/L DA — SIGNIFICANT CHANGE UP (ref 10–60)
ALT FLD-CCNC: 21 U/L DA — SIGNIFICANT CHANGE UP (ref 10–60)
ANION GAP SERPL CALC-SCNC: 10 MMOL/L — SIGNIFICANT CHANGE UP (ref 5–17)
ANION GAP SERPL CALC-SCNC: 13 MMOL/L — SIGNIFICANT CHANGE UP (ref 5–17)
AST SERPL-CCNC: 11 U/L — SIGNIFICANT CHANGE UP (ref 10–40)
AST SERPL-CCNC: 12 U/L — SIGNIFICANT CHANGE UP (ref 10–40)
BASOPHILS # BLD AUTO: 0.2 K/UL — SIGNIFICANT CHANGE UP (ref 0–0.2)
BASOPHILS NFR BLD AUTO: 1.2 % — SIGNIFICANT CHANGE UP (ref 0–2)
BILIRUB SERPL-MCNC: 0.3 MG/DL — SIGNIFICANT CHANGE UP (ref 0.2–1.2)
BILIRUB SERPL-MCNC: 0.3 MG/DL — SIGNIFICANT CHANGE UP (ref 0.2–1.2)
BUN SERPL-MCNC: 88 MG/DL — HIGH (ref 7–18)
BUN SERPL-MCNC: 88 MG/DL — HIGH (ref 7–18)
CALCIUM SERPL-MCNC: 11.2 MG/DL — HIGH (ref 8.4–10.5)
CALCIUM SERPL-MCNC: 11.3 MG/DL — HIGH (ref 8.4–10.5)
CHLORIDE SERPL-SCNC: 104 MMOL/L — SIGNIFICANT CHANGE UP (ref 96–108)
CHLORIDE SERPL-SCNC: 104 MMOL/L — SIGNIFICANT CHANGE UP (ref 96–108)
CO2 SERPL-SCNC: 19 MMOL/L — LOW (ref 22–31)
CO2 SERPL-SCNC: 22 MMOL/L — SIGNIFICANT CHANGE UP (ref 22–31)
CREAT SERPL-MCNC: 12.3 MG/DL — HIGH (ref 0.5–1.3)
CREAT SERPL-MCNC: 12.9 MG/DL — HIGH (ref 0.5–1.3)
EOSINOPHIL # BLD AUTO: 0.6 K/UL — HIGH (ref 0–0.5)
EOSINOPHIL NFR BLD AUTO: 4.9 % — SIGNIFICANT CHANGE UP (ref 0–6)
GLUCOSE BLDC GLUCOMTR-MCNC: 205 MG/DL — HIGH (ref 70–99)
GLUCOSE BLDC GLUCOMTR-MCNC: 90 MG/DL — SIGNIFICANT CHANGE UP (ref 70–99)
GLUCOSE SERPL-MCNC: 129 MG/DL — HIGH (ref 70–99)
GLUCOSE SERPL-MCNC: 139 MG/DL — HIGH (ref 70–99)
HCT VFR BLD CALC: 38.4 % — LOW (ref 39–50)
HGB BLD-MCNC: 12.3 G/DL — LOW (ref 13–17)
LYMPHOCYTES # BLD AUTO: 2.5 K/UL — SIGNIFICANT CHANGE UP (ref 1–3.3)
LYMPHOCYTES # BLD AUTO: 20.2 % — SIGNIFICANT CHANGE UP (ref 13–44)
MAGNESIUM SERPL-MCNC: 2.3 MG/DL — SIGNIFICANT CHANGE UP (ref 1.6–2.6)
MCHC RBC-ENTMCNC: 29.8 PG — SIGNIFICANT CHANGE UP (ref 27–34)
MCHC RBC-ENTMCNC: 32.2 GM/DL — SIGNIFICANT CHANGE UP (ref 32–36)
MCV RBC AUTO: 92.6 FL — SIGNIFICANT CHANGE UP (ref 80–100)
MONOCYTES # BLD AUTO: 0.9 K/UL — SIGNIFICANT CHANGE UP (ref 0–0.9)
MONOCYTES NFR BLD AUTO: 7 % — SIGNIFICANT CHANGE UP (ref 2–14)
NEUTROPHILS # BLD AUTO: 8.3 K/UL — HIGH (ref 1.8–7.4)
NEUTROPHILS NFR BLD AUTO: 66.7 % — SIGNIFICANT CHANGE UP (ref 43–77)
PHOSPHATE SERPL-MCNC: 8.5 MG/DL — HIGH (ref 2.5–4.5)
PLATELET # BLD AUTO: 383 K/UL — SIGNIFICANT CHANGE UP (ref 150–400)
POTASSIUM SERPL-MCNC: 5.1 MMOL/L — SIGNIFICANT CHANGE UP (ref 3.5–5.3)
POTASSIUM SERPL-MCNC: 6 MMOL/L — HIGH (ref 3.5–5.3)
POTASSIUM SERPL-SCNC: 5.1 MMOL/L — SIGNIFICANT CHANGE UP (ref 3.5–5.3)
POTASSIUM SERPL-SCNC: 6 MMOL/L — HIGH (ref 3.5–5.3)
PROT SERPL-MCNC: 8.2 G/DL — SIGNIFICANT CHANGE UP (ref 6–8.3)
PROT SERPL-MCNC: 8.6 G/DL — HIGH (ref 6–8.3)
RBC # BLD: 4.14 M/UL — LOW (ref 4.2–5.8)
RBC # FLD: 14.6 % — HIGH (ref 10.3–14.5)
SODIUM SERPL-SCNC: 136 MMOL/L — SIGNIFICANT CHANGE UP (ref 135–145)
SODIUM SERPL-SCNC: 136 MMOL/L — SIGNIFICANT CHANGE UP (ref 135–145)
WBC # BLD: 12.5 K/UL — HIGH (ref 3.8–10.5)
WBC # FLD AUTO: 12.5 K/UL — HIGH (ref 3.8–10.5)

## 2018-11-20 PROCEDURE — 99285 EMERGENCY DEPT VISIT HI MDM: CPT

## 2018-11-20 PROCEDURE — 71046 X-RAY EXAM CHEST 2 VIEWS: CPT | Mod: 26

## 2018-11-20 RX ORDER — FUROSEMIDE 40 MG
1 TABLET ORAL
Qty: 0 | Refills: 0 | COMMUNITY

## 2018-11-20 RX ORDER — METHOCARBAMOL 500 MG/1
1 TABLET, FILM COATED ORAL
Qty: 0 | Refills: 0 | COMMUNITY

## 2018-11-20 RX ORDER — GABAPENTIN 400 MG/1
400 CAPSULE ORAL
Qty: 0 | Refills: 0 | Status: DISCONTINUED | OUTPATIENT
Start: 2018-11-20 | End: 2018-11-22

## 2018-11-20 RX ORDER — DEXTROSE 50 % IN WATER 50 %
50 SYRINGE (ML) INTRAVENOUS ONCE
Qty: 0 | Refills: 0 | Status: COMPLETED | OUTPATIENT
Start: 2018-11-20 | End: 2018-11-20

## 2018-11-20 RX ORDER — ERGOCALCIFEROL 1.25 MG/1
1 CAPSULE ORAL
Qty: 0 | Refills: 0 | COMMUNITY

## 2018-11-20 RX ORDER — INSULIN HUMAN 100 [IU]/ML
5 INJECTION, SOLUTION SUBCUTANEOUS ONCE
Qty: 0 | Refills: 0 | Status: COMPLETED | OUTPATIENT
Start: 2018-11-20 | End: 2018-11-20

## 2018-11-20 RX ORDER — HEPARIN SODIUM 5000 [USP'U]/ML
5000 INJECTION INTRAVENOUS; SUBCUTANEOUS EVERY 12 HOURS
Qty: 0 | Refills: 0 | Status: DISCONTINUED | OUTPATIENT
Start: 2018-11-20 | End: 2018-11-22

## 2018-11-20 RX ORDER — INSULIN GLARGINE 100 [IU]/ML
40 INJECTION, SOLUTION SUBCUTANEOUS
Qty: 0 | Refills: 0 | COMMUNITY

## 2018-11-20 RX ORDER — MORPHINE SULFATE 50 MG/1
4 CAPSULE, EXTENDED RELEASE ORAL ONCE
Qty: 0 | Refills: 0 | Status: DISCONTINUED | OUTPATIENT
Start: 2018-11-20 | End: 2018-11-20

## 2018-11-20 RX ORDER — PANTOPRAZOLE SODIUM 20 MG/1
40 TABLET, DELAYED RELEASE ORAL
Qty: 0 | Refills: 0 | Status: DISCONTINUED | OUTPATIENT
Start: 2018-11-20 | End: 2018-11-22

## 2018-11-20 RX ORDER — MIRTAZAPINE 45 MG/1
45 TABLET, ORALLY DISINTEGRATING ORAL DAILY
Qty: 0 | Refills: 0 | Status: DISCONTINUED | OUTPATIENT
Start: 2018-11-20 | End: 2018-11-22

## 2018-11-20 RX ORDER — ESCITALOPRAM OXALATE 10 MG/1
10 TABLET, FILM COATED ORAL DAILY
Qty: 0 | Refills: 0 | Status: DISCONTINUED | OUTPATIENT
Start: 2018-11-20 | End: 2018-11-22

## 2018-11-20 RX ORDER — INSULIN HUMAN 100 [IU]/ML
5 INJECTION, SOLUTION SUBCUTANEOUS ONCE
Qty: 0 | Refills: 0 | Status: DISCONTINUED | OUTPATIENT
Start: 2018-11-20 | End: 2018-11-20

## 2018-11-20 RX ORDER — CINACALCET 30 MG/1
30 TABLET, FILM COATED ORAL DAILY
Qty: 0 | Refills: 0 | Status: DISCONTINUED | OUTPATIENT
Start: 2018-11-20 | End: 2018-11-22

## 2018-11-20 RX ORDER — INSULIN GLARGINE 100 [IU]/ML
20 INJECTION, SOLUTION SUBCUTANEOUS AT BEDTIME
Qty: 0 | Refills: 0 | Status: DISCONTINUED | OUTPATIENT
Start: 2018-11-20 | End: 2018-11-22

## 2018-11-20 RX ORDER — QUETIAPINE FUMARATE 200 MG/1
200 TABLET, FILM COATED ORAL DAILY
Qty: 0 | Refills: 0 | Status: DISCONTINUED | OUTPATIENT
Start: 2018-11-20 | End: 2018-11-22

## 2018-11-20 RX ORDER — LIDOCAINE 4 G/100G
1 CREAM TOPICAL ONCE
Qty: 0 | Refills: 0 | Status: COMPLETED | OUTPATIENT
Start: 2018-11-20 | End: 2018-11-20

## 2018-11-20 RX ORDER — INSULIN LISPRO 100/ML
VIAL (ML) SUBCUTANEOUS
Qty: 0 | Refills: 0 | Status: DISCONTINUED | OUTPATIENT
Start: 2018-11-20 | End: 2018-11-22

## 2018-11-20 RX ORDER — INSULIN LISPRO 100/ML
10 VIAL (ML) SUBCUTANEOUS
Qty: 0 | Refills: 0 | Status: DISCONTINUED | OUTPATIENT
Start: 2018-11-20 | End: 2018-11-22

## 2018-11-20 RX ORDER — CALCIUM GLUCONATE 100 MG/ML
2 VIAL (ML) INTRAVENOUS ONCE
Qty: 0 | Refills: 0 | Status: COMPLETED | OUTPATIENT
Start: 2018-11-20 | End: 2018-11-20

## 2018-11-20 RX ORDER — SEVELAMER CARBONATE 2400 MG/1
1600 POWDER, FOR SUSPENSION ORAL
Qty: 0 | Refills: 0 | Status: DISCONTINUED | OUTPATIENT
Start: 2018-11-20 | End: 2018-11-22

## 2018-11-20 RX ORDER — INSULIN HUMAN 100 [IU]/ML
10 INJECTION, SOLUTION SUBCUTANEOUS ONCE
Qty: 0 | Refills: 0 | Status: DISCONTINUED | OUTPATIENT
Start: 2018-11-20 | End: 2018-11-20

## 2018-11-20 RX ORDER — SIMVASTATIN 20 MG/1
40 TABLET, FILM COATED ORAL AT BEDTIME
Qty: 0 | Refills: 0 | Status: DISCONTINUED | OUTPATIENT
Start: 2018-11-20 | End: 2018-11-22

## 2018-11-20 RX ORDER — MORPHINE SULFATE 50 MG/1
2 CAPSULE, EXTENDED RELEASE ORAL EVERY 6 HOURS
Qty: 0 | Refills: 0 | Status: DISCONTINUED | OUTPATIENT
Start: 2018-11-20 | End: 2018-11-21

## 2018-11-20 RX ORDER — INSULIN ASPART 100 [IU]/ML
5 INJECTION, SOLUTION SUBCUTANEOUS
Qty: 0 | Refills: 0 | COMMUNITY

## 2018-11-20 RX ORDER — ALBUTEROL 90 UG/1
10 AEROSOL, METERED ORAL ONCE
Qty: 0 | Refills: 0 | Status: COMPLETED | OUTPATIENT
Start: 2018-11-20 | End: 2018-11-20

## 2018-11-20 RX ADMIN — ALBUTEROL 10 MILLIGRAM(S): 90 AEROSOL, METERED ORAL at 12:07

## 2018-11-20 RX ADMIN — Medication 200 GRAM(S): at 13:01

## 2018-11-20 RX ADMIN — LIDOCAINE 1 PATCH: 4 CREAM TOPICAL at 12:06

## 2018-11-20 RX ADMIN — SIMVASTATIN 40 MILLIGRAM(S): 20 TABLET, FILM COATED ORAL at 21:47

## 2018-11-20 RX ADMIN — MORPHINE SULFATE 2 MILLIGRAM(S): 50 CAPSULE, EXTENDED RELEASE ORAL at 21:57

## 2018-11-20 RX ADMIN — Medication 50 MILLILITER(S): at 12:06

## 2018-11-20 RX ADMIN — INSULIN GLARGINE 20 UNIT(S): 100 INJECTION, SOLUTION SUBCUTANEOUS at 21:48

## 2018-11-20 RX ADMIN — INSULIN HUMAN 5 UNIT(S): 100 INJECTION, SOLUTION SUBCUTANEOUS at 12:07

## 2018-11-20 RX ADMIN — MORPHINE SULFATE 4 MILLIGRAM(S): 50 CAPSULE, EXTENDED RELEASE ORAL at 12:06

## 2018-11-20 NOTE — ED PROVIDER NOTE - MEDICAL DECISION MAKING DETAILS
Patient is a 37yo man, active smoker, with a PMH of DM, schizophrenia, chronic back pain, ESRD on HD TTS at ? presenting with back pain x 1wk and missed dialysis.  likely hyperkalemia due to missed dialysis.  pt also shows opioid dependency

## 2018-11-20 NOTE — H&P ADULT - HISTORY OF PRESENT ILLNESS
Patient is a 39 y/o M from Home with History of ESRD on HD TTS via left arm AV fistula/graft x 2 years, HTN, HLD, DM, chronic back pain presented with complaint of missing HD . His last HD was on Tuesday. He missed 3 sessions of HD due to scheduling issues. Patient currently doesn't have a nephrologist and PCP. He reported chronic back pain for unknown duration, 10/10 in intensity, dull, radiating to left leg. He also complaint of abdominal pain, 3/10 in intensity, cramping in nature associated with diarrhea. he reports compliance with medication. denies any fever, chest pain, shortness of breath. In ED patient was in no acute distress. Vitals are stable. His Serum k+ is 6.0 w//o any EKG changes. he recieved cocktail for hyperkalemia including duoneb, calcium gluconate, insulin and dextrose 50%. CXR is clear. Nephrology was consulted for  HD today. Patient is a 39 y/o M from Home with History of ESRD on HD TTS via left arm AV fistula/graft x 2 years, HTN, HLD, DM, chronic back pain presented with complaint of missing HD . His last HD was on Tuesday. He missed 3 sessions of HD due to "scheduling issues".   Patient currently doesn't have a nephrologist and PCP.   He reported chronic back pain for unknown duration, 10/10 in intensity, dull, radiating to left leg. He also complaint of abdominal pain, 3/10 in intensity, cramping in nature associated with diarrhea. he reports compliance with medication. denies any fever, chest pain, shortness of breath.   In ED patient was in no acute distress. Vitals are stable. His Serum k+ is 6.0 w//o any EKG changes. he recieved cocktail for hyperkalemia including duoneb, calcium gluconate, insulin and dextrose 50%. CXR is clear. Nephrology was consulted for  HD today.

## 2018-11-20 NOTE — H&P ADULT - PROBLEM SELECTOR PLAN 2
Patient has electrolyte imbalance. K+ of 6.0. EKG has no new changes. Will monitor on telemetry.  -will need HD today.  nephrologist Dr La

## 2018-11-20 NOTE — ED PROVIDER NOTE - OBJECTIVE STATEMENT
Patient is a 37yo man, active smoker, with a PMH of DM, schizophrenia, chronic back pain, ESRD on HD TTS at ? presenting with back pain x 1wk. Patient states that his last session of HD was one week ago because he missed 3 sessions since they changed the schedule due to the holidays. He states that due to missing dialysis, he has been experiencing lower back pain for the past week that has persisted at "over 10/10" severity and is sharp and cramping in quality. Patient has had this back pain for many years in the past and was given Dilaudid PO by a "doctor in [place]" 2 weeks ago for the pain. He finished this prescription yesterday and decided to come to the ED today for the pain, specifically requesting Dilaudid because "nothing else works for him". Patient has also had nausea and vomiting NBNB that began 4 days ago and lasted for 2 days, associated with decreased PO intake. He still produces urine at baseline and has had a slight decrease in urination over the past week. He reports dizziness and lightheadedness, worsening over the past week, experiencing a syncopal episode due to lightheadedness 2 days ago. He is unsure if he hit his head. Patient reports palpitations and cough at baseline that he attributes to smoking, with no change recently. No headache, no chest pain, no SOB, no peripheral edema, no sick contacts, no recent illness, no current suicidal or homicidal ideation.

## 2018-11-20 NOTE — H&P ADULT - NSHPPHYSICALEXAM_GEN_ALL_CORE
PHYSICAL EXAM:  GENERAL: NAD, obese male, lying comfortable on bed   HEENT: Normocephalic;  conjunctivae and sclerae clear; moist mucous membranes;   NECK : supple  CHEST/LUNG: Clear to auscultation bilaterally with good air entry   HEART: S1 S2  regular; no murmurs, gallops or rubs  ABDOMEN: Soft, Nontender, Nondistended; Bowel sounds present  EXTREMITIES: no cyanosis; no edema; tenderness to right leg, mild warmth, slight erythema , left upper arm AV graft in place. +ve thrill   SKIN: warm and dry; no rash, multiple tattoos   NERVOUS SYSTEM:  Awake and alert; Oriented  to place, person and time ; no new deficits

## 2018-11-20 NOTE — ED PROVIDER NOTE - CONSTITUTIONAL, MLM
normal... Well appearing, well nourished, awake, alert, oriented to person, place, time/situation and in no apparent distress. morbid obese

## 2018-11-20 NOTE — H&P ADULT - PROBLEM SELECTOR PLAN 1
-Patient has history of ESRD ,goes to HD TTS ,missed 3 sessions of HD  -Patient doesn't look volume overloaded, However has electrolyte imbalance. K+ of 6.0. EKG has no new changes. Will monitor on telemetry   -s/p calcium gluconate, Insulin IV, dextrose and Albuterol in ED.   -Will need HD today  -Nephrology consulted Dr La

## 2018-11-20 NOTE — ED ADULT NURSE NOTE - NSIMPLEMENTINTERV_GEN_ALL_ED
Implemented All Fall with Harm Risk Interventions:  [place] to call system. Call bell, personal items and telephone within reach. Instruct patient to call for assistance. Room bathroom lighting operational. Non-slip footwear when patient is off stretcher. Physically safe environment: no spills, clutter or unnecessary equipment. Stretcher in lowest position, wheels locked, appropriate side rails in place. Provide visual cue, wrist band, yellow gown, etc. Monitor gait and stability. Monitor for mental status changes and reorient to person, place, and time. Review medications for side effects contributing to fall risk. Reinforce activity limits and safety measures with patient and family. Provide visual clues: red socks.

## 2018-11-20 NOTE — ED ADULT TRIAGE NOTE - CHIEF COMPLAINT QUOTE
Lower back pain x 1 week. Pt missed Dialysis x 1 week. Had vomiting x 4 days. Stopped x Monday am. Lower back pain x 1 week. Pt missed Dialysis x 1 week. Had vomiting x 4 days. Stopped x Monday am. Pt says hearing voices. Has h/o Schizophrenia. Denies suicide ideation.

## 2018-11-20 NOTE — H&P ADULT - PROBLEM SELECTOR PLAN 3
-Patient has chronic back pain, denies any trauma or injury.   -he reported and requsted dilauded from ED provider  -received 4mg IV morphine in ED  -Will consult Pain management

## 2018-11-20 NOTE — ED ADULT NURSE NOTE - OBJECTIVE STATEMENT
Patient present to ED c/o lower back pain on pain scale 10/10 sharp, achy pressure like in sensation taking his last does of pain medication this morning 5am. Patient also noted not to have dialysis for one week last time he was dialyzed  tuesday. Left upper arm AV graft

## 2018-11-20 NOTE — ED PROVIDER NOTE - CARDIAC, MLM
Normal rate, regular rhythm.  Heart sounds S1, S2.  No murmurs, rubs or gallops. left acv at biceps normal bruit and thrill

## 2018-11-20 NOTE — ED ADULT NURSE NOTE - CHIEF COMPLAINT QUOTE
Lower back pain x 1 week. Pt missed Dialysis x 1 week. Had vomiting x 4 days. Stopped x Monday am. Pt says hearing voices. Has h/o Schizophrenia. Denies suicide ideation.

## 2018-11-20 NOTE — H&P ADULT - ASSESSMENT
Patient is a 39 y/o M from Home with History of ESRD on HD TTS via left arm AV fistula/graft x 2 years, HTN, HLD, DM, chronic back pain presented with complaint of missing HD . His last HD was on Tuesday. He missed 3 sessions of HD due to scheduling issues. Patient currently doesn't have a nephrologist and PCP. He reported chronic back pain for unknown duration, 10/10 in intensity, dull, radiating to left leg. He also complaint of abdominal pain, 3/10 in intensity, cramping in nature associated with diarrhea. he reports compliance with medication. denies any fever, chest pain, shortness of breath.

## 2018-11-20 NOTE — ED PROVIDER NOTE - PROGRESS NOTE DETAILS
Cheney: labs shows K 6.0  admit to med [facility] for hyperkalemia and will need dialysis.  contact dr welch and mirela gould

## 2018-11-20 NOTE — ED ADULT NURSE NOTE - ED STAT RN HANDOFF DETAILS
Patient admitted to telemetry at present in dialysis to be transported to 5 [address] after Dialysis. report given to DANIEL Jiang on floor.

## 2018-11-20 NOTE — ED ADULT NURSE NOTE - CHPI ED NUR SYMPTOMS NEG
no neck tenderness/no tingling/no bowel dysfunction/no constipation/no motor function loss/no numbness/no difficulty bearing weight/no bladder dysfunction/no anorexia/no fatigue

## 2018-11-21 ENCOUNTER — TRANSCRIPTION ENCOUNTER (OUTPATIENT)
Age: 38
End: 2018-11-21

## 2018-11-21 LAB
ALBUMIN SERPL ELPH-MCNC: 2.7 G/DL — LOW (ref 3.5–5)
ALP SERPL-CCNC: 103 U/L — SIGNIFICANT CHANGE UP (ref 40–120)
ALT FLD-CCNC: 19 U/L DA — SIGNIFICANT CHANGE UP (ref 10–60)
ANION GAP SERPL CALC-SCNC: 15 MMOL/L — SIGNIFICANT CHANGE UP (ref 5–17)
AST SERPL-CCNC: 11 U/L — SIGNIFICANT CHANGE UP (ref 10–40)
BASOPHILS # BLD AUTO: 0.1 K/UL — SIGNIFICANT CHANGE UP (ref 0–0.2)
BASOPHILS NFR BLD AUTO: 0.6 % — SIGNIFICANT CHANGE UP (ref 0–2)
BILIRUB SERPL-MCNC: 0.3 MG/DL — SIGNIFICANT CHANGE UP (ref 0.2–1.2)
BUN SERPL-MCNC: 67 MG/DL — HIGH (ref 7–18)
CALCIUM SERPL-MCNC: 10.6 MG/DL — HIGH (ref 8.4–10.5)
CHLORIDE SERPL-SCNC: 96 MMOL/L — SIGNIFICANT CHANGE UP (ref 96–108)
CO2 SERPL-SCNC: 24 MMOL/L — SIGNIFICANT CHANGE UP (ref 22–31)
CREAT SERPL-MCNC: 9.93 MG/DL — HIGH (ref 0.5–1.3)
EOSINOPHIL # BLD AUTO: 0.5 K/UL — SIGNIFICANT CHANGE UP (ref 0–0.5)
EOSINOPHIL NFR BLD AUTO: 4.5 % — SIGNIFICANT CHANGE UP (ref 0–6)
GLUCOSE BLDC GLUCOMTR-MCNC: 115 MG/DL — HIGH (ref 70–99)
GLUCOSE BLDC GLUCOMTR-MCNC: 135 MG/DL — HIGH (ref 70–99)
GLUCOSE BLDC GLUCOMTR-MCNC: 136 MG/DL — HIGH (ref 70–99)
GLUCOSE BLDC GLUCOMTR-MCNC: 138 MG/DL — HIGH (ref 70–99)
GLUCOSE BLDC GLUCOMTR-MCNC: 142 MG/DL — HIGH (ref 70–99)
GLUCOSE BLDC GLUCOMTR-MCNC: 158 MG/DL — HIGH (ref 70–99)
GLUCOSE BLDC GLUCOMTR-MCNC: 166 MG/DL — HIGH (ref 70–99)
GLUCOSE SERPL-MCNC: 132 MG/DL — HIGH (ref 70–99)
HBA1C BLD-MCNC: 7 % — HIGH (ref 4–5.6)
HCT VFR BLD CALC: 35.9 % — LOW (ref 39–50)
HGB BLD-MCNC: 11.6 G/DL — LOW (ref 13–17)
LYMPHOCYTES # BLD AUTO: 19.9 % — SIGNIFICANT CHANGE UP (ref 13–44)
LYMPHOCYTES # BLD AUTO: 2.2 K/UL — SIGNIFICANT CHANGE UP (ref 1–3.3)
MCHC RBC-ENTMCNC: 29.8 PG — SIGNIFICANT CHANGE UP (ref 27–34)
MCHC RBC-ENTMCNC: 32.4 GM/DL — SIGNIFICANT CHANGE UP (ref 32–36)
MCV RBC AUTO: 92.2 FL — SIGNIFICANT CHANGE UP (ref 80–100)
MONOCYTES # BLD AUTO: 0.7 K/UL — SIGNIFICANT CHANGE UP (ref 0–0.9)
MONOCYTES NFR BLD AUTO: 6.6 % — SIGNIFICANT CHANGE UP (ref 2–14)
NEUTROPHILS # BLD AUTO: 7.6 K/UL — HIGH (ref 1.8–7.4)
NEUTROPHILS NFR BLD AUTO: 68.4 % — SIGNIFICANT CHANGE UP (ref 43–77)
PLATELET # BLD AUTO: 353 K/UL — SIGNIFICANT CHANGE UP (ref 150–400)
POTASSIUM SERPL-MCNC: 5.1 MMOL/L — SIGNIFICANT CHANGE UP (ref 3.5–5.3)
POTASSIUM SERPL-SCNC: 5.1 MMOL/L — SIGNIFICANT CHANGE UP (ref 3.5–5.3)
PROT SERPL-MCNC: 8 G/DL — SIGNIFICANT CHANGE UP (ref 6–8.3)
RBC # BLD: 3.89 M/UL — LOW (ref 4.2–5.8)
RBC # FLD: 14.1 % — SIGNIFICANT CHANGE UP (ref 10.3–14.5)
SODIUM SERPL-SCNC: 135 MMOL/L — SIGNIFICANT CHANGE UP (ref 135–145)
WBC # BLD: 11.2 K/UL — HIGH (ref 3.8–10.5)
WBC # FLD AUTO: 11.2 K/UL — HIGH (ref 3.8–10.5)

## 2018-11-21 RX ORDER — ACETAMINOPHEN 500 MG
1000 TABLET ORAL ONCE
Qty: 0 | Refills: 0 | Status: COMPLETED | OUTPATIENT
Start: 2018-11-21 | End: 2018-11-21

## 2018-11-21 RX ORDER — ACETAMINOPHEN 500 MG
650 TABLET ORAL ONCE
Qty: 0 | Refills: 0 | Status: COMPLETED | OUTPATIENT
Start: 2018-11-21 | End: 2018-11-21

## 2018-11-21 RX ADMIN — Medication 1000 MILLIGRAM(S): at 23:00

## 2018-11-21 RX ADMIN — Medication 10 UNIT(S): at 18:38

## 2018-11-21 RX ADMIN — HEPARIN SODIUM 5000 UNIT(S): 5000 INJECTION INTRAVENOUS; SUBCUTANEOUS at 05:43

## 2018-11-21 RX ADMIN — QUETIAPINE FUMARATE 200 MILLIGRAM(S): 200 TABLET, FILM COATED ORAL at 11:25

## 2018-11-21 RX ADMIN — Medication 10 UNIT(S): at 09:45

## 2018-11-21 RX ADMIN — MORPHINE SULFATE 2 MILLIGRAM(S): 50 CAPSULE, EXTENDED RELEASE ORAL at 00:21

## 2018-11-21 RX ADMIN — GABAPENTIN 400 MILLIGRAM(S): 400 CAPSULE ORAL at 05:43

## 2018-11-21 RX ADMIN — SEVELAMER CARBONATE 1600 MILLIGRAM(S): 2400 POWDER, FOR SUSPENSION ORAL at 13:07

## 2018-11-21 RX ADMIN — INSULIN GLARGINE 20 UNIT(S): 100 INJECTION, SOLUTION SUBCUTANEOUS at 21:04

## 2018-11-21 RX ADMIN — ESCITALOPRAM OXALATE 10 MILLIGRAM(S): 10 TABLET, FILM COATED ORAL at 11:26

## 2018-11-21 RX ADMIN — MORPHINE SULFATE 2 MILLIGRAM(S): 50 CAPSULE, EXTENDED RELEASE ORAL at 04:40

## 2018-11-21 RX ADMIN — SEVELAMER CARBONATE 1600 MILLIGRAM(S): 2400 POWDER, FOR SUSPENSION ORAL at 09:36

## 2018-11-21 RX ADMIN — Medication 1000 MILLIGRAM(S): at 11:24

## 2018-11-21 RX ADMIN — PANTOPRAZOLE SODIUM 40 MILLIGRAM(S): 20 TABLET, DELAYED RELEASE ORAL at 05:43

## 2018-11-21 RX ADMIN — Medication 400 MILLIGRAM(S): at 10:30

## 2018-11-21 RX ADMIN — MIRTAZAPINE 45 MILLIGRAM(S): 45 TABLET, ORALLY DISINTEGRATING ORAL at 11:26

## 2018-11-21 RX ADMIN — Medication 10 UNIT(S): at 13:16

## 2018-11-21 RX ADMIN — CINACALCET 30 MILLIGRAM(S): 30 TABLET, FILM COATED ORAL at 18:37

## 2018-11-21 RX ADMIN — MORPHINE SULFATE 2 MILLIGRAM(S): 50 CAPSULE, EXTENDED RELEASE ORAL at 03:19

## 2018-11-21 RX ADMIN — SIMVASTATIN 40 MILLIGRAM(S): 20 TABLET, FILM COATED ORAL at 21:05

## 2018-11-21 RX ADMIN — GABAPENTIN 400 MILLIGRAM(S): 400 CAPSULE ORAL at 18:36

## 2018-11-21 RX ADMIN — Medication 400 MILLIGRAM(S): at 22:25

## 2018-11-21 RX ADMIN — SEVELAMER CARBONATE 1600 MILLIGRAM(S): 2400 POWDER, FOR SUSPENSION ORAL at 18:35

## 2018-11-21 RX ADMIN — HEPARIN SODIUM 5000 UNIT(S): 5000 INJECTION INTRAVENOUS; SUBCUTANEOUS at 18:37

## 2018-11-21 NOTE — PROGRESS NOTE ADULT - ASSESSMENT
38/M from home, works 2 jobs, PMhx of ESRD on HD, TTS, DM, HTN, current active smoker, morbidly obese comes in for eval after sent in by dialysis center for missed dialysis for 1 week.     stable vitals in the ED, no acute resp distress, comfortable, unable to get a CBC as pt is a very hard stick, labs to be drawn intra-dialysis, Chem: Bicarb of 13, BUN/Cr: 61/7/73, CXR: question slight CHF. requested Nephro Consult Dr La for urgent HD 38/M from home, works 2 jobs, PMhx of ESRD on HD, TTS, DM, HTN, current active smoker, morbidly obese comes in for eval after sent in by dialysis center for missed dialysis for 1 week.     stable vitals in the ED, no acute resp distress, comfortable, unable to get a CBC as pt is a very hard stick, labs to be drawn intra-dialysis, Chem: Bicarb of 13, BUN/Cr: 61/7/73, CXR: question slight CHF. requested Nephro Consult Dr La for urgent HD      ----------------------------------  Attending addendum:  ----------------------------------    Patient seen, examined, and interviewed by me earlier, case discussed with house staff and  , chart reviewed, agree with this note as reviewed by me.  See  full note above.  pt post two dialysis sessions  outpatient dialysis unite being set p  awaiting CT spine toeval source of pain  already discussed in detail re continue weight loss and compliance with diet, medications and close outpatient follow up  depending on results of CT, may be able to be DC in next 24-48 hrs  pain mgmt and renal follow up

## 2018-11-21 NOTE — PROGRESS NOTE ADULT - PROBLEM SELECTOR PLAN 1
Patient missed HD for 1 week, in metabolic acidosis however not hyper K and volume overloaded/resp distress at this time  ESRD with HD on TTS   s/p urgent HD on 06/19  c/w sensipar/renagel/NephroVite/Renal diet  nephro consulted Dr La

## 2018-11-21 NOTE — PROGRESS NOTE ADULT - SUBJECTIVE AND OBJECTIVE BOX
PGY1 Note discussed with Supervising Resident and Primary Attending.    Patient is a 38y old  Male who presents with a chief complaint of Missed HD (20 Nov 2018 13:00)      INTERVAL HPI/OVERNIGHT EVENTS :    MEDICATIONS  (STANDING):  cinacalcet 30 milliGRAM(s) Oral daily  escitalopram 10 milliGRAM(s) Oral daily  gabapentin 400 milliGRAM(s) Oral two times a day  heparin  Injectable 5000 Unit(s) SubCutaneous every 12 hours  insulin glargine Injectable (LANTUS) 20 Unit(s) SubCutaneous at bedtime  insulin lispro (HumaLOG) corrective regimen sliding scale   SubCutaneous three times a day before meals  insulin lispro Injectable (HumaLOG) 10 Unit(s) SubCutaneous three times a day before meals  mirtazapine 45 milliGRAM(s) Oral daily  pantoprazole    Tablet 40 milliGRAM(s) Oral before breakfast  QUEtiapine 200 milliGRAM(s) Oral daily  sevelamer hydrochloride 1600 milliGRAM(s) Oral three times a day with meals  simvastatin 40 milliGRAM(s) Oral at bedtime    MEDICATIONS  (PRN):      Allergies    aspirin (Swelling)  penicillins (Swelling)  shellfish (Unknown)    Intolerances        REVIEW OF SYSTEMS :  * CONSTITUTIONAL      : No Fever, Weight loss, or Fatigue  * EYES                             : No eye pain , Visual disturbances or Discharge  * RESPIRATORY             : No Cough, Wheezing, Chills or Hemoptysis; No shortness of breath  * CARDIOVASCULAR     : No Chest pain, Palpitations, Dizziness, or Leg swelling  * GASTROINTESTINAL  : No Abdominal or Epigastric pain. No Nausea, Vomiting or Hematemesis; No Diarrhea or Constipation. No Melena or Hematochezia.  * GENITOURINARY        : No Dysuria , Frequency , Haematuria   * NEUROLOGICAL          : No Headaches, Memory loss, Loss of trength, Numbness, or Tremors  * MUSCULOSKELETAL   : No Joint pain  * PSYCHIATRY                 : No Depression or Anxiety   * HEME/LYMPH              : No Easy Bruising or Bleeding gums  * SKIN                               : No Itching, Burning, Rashes, or Lesions     Vital Signs Last 24 Hrs  T(C): 36.8 (21 Nov 2018 05:52), Max: 37.3 (20 Nov 2018 23:59)  T(F): 98.3 (21 Nov 2018 05:52), Max: 99.2 (20 Nov 2018 23:59)  HR: 85 (21 Nov 2018 05:52) (79 - 91)  BP: 149/88 (21 Nov 2018 05:52) (122/95 - 157/98)  BP(mean): --  RR: 18 (21 Nov 2018 05:52) (17 - 20)  SpO2: 98% (21 Nov 2018 05:52) (98% - 100%)    PHYSICAL EXAM :  * GENERAL                 : NAD, Well-groomed, Well-developed  * HEAD                       :  Atraumatic, Normocephalic  * EYES                         : EOMI, PERRLA, Conjunctiva and Sclera clear  * ENT                           : Moist Mucous Membranes  * NECK                         : Supple, No JVD, Normal Thyroid  * CHEST/LUNG           : Clear to Auscultation bilaterally; No Rales, Rhonchi, Wheezing or Rubs  * HEART                       : Regular Rate and Rhythm; No murmurs, Rubs or gallops  * ABDOMEN                : Soft, Non-tender, Non-distended; Bowel Sounds present  * NERVOUS SYSTEM  :  Alert & Oriented X3, Good Concentration; Motor Strength 5/5 B/L UL LL ; DTRs 2+ Intact and Symmetric  * EXTREMITIES            :  2+ Peripheral Pulses, No clubbing, cyanosis, or edema  * SKIN                           : No Rashes or Lesions    LABS:                          12.3   12.5  )-----------( 383      ( 20 Nov 2018 11:01 )             38.4     11-20    136  |  104  |  88<H>  ----------------------------<  139<H>  5.1   |  22  |  12.90<H>    Ca    11.2<H>      20 Nov 2018 15:03  Phos  8.5     11-20  Mg     2.3     11-20    TPro  8.2  /  Alb  2.9<L>  /  TBili  0.3  /  DBili  x   /  AST  12  /  ALT  21  /  AlkPhos  110  11-20        CAPILLARY BLOOD GLUCOSE      POCT Blood Glucose.: 135 mg/dL (21 Nov 2018 07:59)  POCT Blood Glucose.: 205 mg/dL (20 Nov 2018 20:58)  POCT Blood Glucose.: 90 mg/dL (20 Nov 2018 16:50)      RADIOLOGY & ADDITIONAL TESTS:   No radiological imaging was required    Imaging Personally Reviewed   :  [ ] YES  [ ] NO    Consultant(s) Notes Reviewed :  [ ] YES  [ ] NO

## 2018-11-21 NOTE — PROGRESS NOTE ADULT - SUBJECTIVE AND OBJECTIVE BOX
awaits HD today    aspirin (Swelling)  penicillins (Swelling)  shellfish (Unknown)    Hospital Medications:   MEDICATIONS  (STANDING):  cinacalcet 30 milliGRAM(s) Oral daily  escitalopram 10 milliGRAM(s) Oral daily  gabapentin 400 milliGRAM(s) Oral two times a day  heparin  Injectable 5000 Unit(s) SubCutaneous every 12 hours  insulin glargine Injectable (LANTUS) 20 Unit(s) SubCutaneous at bedtime  insulin lispro (HumaLOG) corrective regimen sliding scale   SubCutaneous three times a day before meals  insulin lispro Injectable (HumaLOG) 10 Unit(s) SubCutaneous three times a day before meals  mirtazapine 45 milliGRAM(s) Oral daily  pantoprazole    Tablet 40 milliGRAM(s) Oral before breakfast  QUEtiapine 200 milliGRAM(s) Oral daily  sevelamer hydrochloride 1600 milliGRAM(s) Oral three times a day with meals  simvastatin 40 milliGRAM(s) Oral at bedtime        VITALS:  T(F): 98.5 (11-21-18 @ 11:42), Max: 99.2 (11-20-18 @ 23:59)  HR: 74 (11-21-18 @ 11:42)  BP: 132/73 (11-21-18 @ 11:42)  RR: 18 (11-21-18 @ 11:42)  SpO2: 95% (11-21-18 @ 11:42)  Wt(kg): --      PHYSICAL EXAM:  Constitutional: NAD  HEENT: anicteric sclera, oropharynx clear.  Neck: No JVD  Respiratory: CTAB, no wheezes, rales or rhonchi  Cardiovascular: S1, S2, RRR  Gastrointestinal: BS+, soft, NT/ND  Extremities: No cyanosis or clubbing. No peripheral edema  Neurological: A/O x 3, no focal deficits  Vascular Access: LUEAVF with thrill and bruit    LABS:  11-21    135  |  96  |  67<H>  ----------------------------<  132<H>  5.1   |  24  |  9.93<H>    Ca    10.6<H>      21 Nov 2018 07:40  Phos  8.5     11-20  Mg     2.3     11-20    TPro  8.0  /  Alb  2.7<L>  /  TBili  0.3  /  DBili      /  AST  11  /  ALT  19  /  AlkPhos  103  11-21    Creatinine Trend: 9.93 <--, 12.90 <--, 12.30 <--                        11.6   11.2  )-----------( 353      ( 21 Nov 2018 07:40 )             35.9     Urine Studies:      RADIOLOGY & ADDITIONAL STUDIES:

## 2018-11-21 NOTE — DISCHARGE NOTE ADULT - PATIENT PORTAL LINK FT
You can access the [facility] Patient Portal, offered by [facility], by registering with the following website: [email or web address]

## 2018-11-21 NOTE — CHART NOTE - NSCHARTNOTEFT_GEN_A_CORE
Paged by RN that pt is refusing po tylenol and requesting iv med. Assessed bedside reports an 8/10 pain in lower back radiating to left leg with numbness of LLE. Pt received a dose iv acetaminophen at 10am, will give another dose. However since pt is ESRD, would request primary team to get pain management consult for proper dosing of his medications. Thank you.

## 2018-11-21 NOTE — DISCHARGE NOTE ADULT - PLAN OF CARE
Patient missed HD for 1 week, in metabolic acidosis however not hyper K and volume overloaded/resp distress at this time  ESRD with HD on TTS   s/p urgent HD on 06/19  c/w sensipar/renagel/NephroVite/Renal diet  nephro consulted Dr La. : takes 20-40U Lantus QHS, will give 30U QHS, Humalog and HiSS  A1C 8.2. cessation advised  nicotine patch. Dialysis resume and reinstate Dialysis schedule - You  missed HD for 1 week, presenting with metabolic acidosis  dialysis schedule was reinsateted  nephro consulted Dr La.  - You are medically stable to be discharged from the hospital.  - You need to follow up with your Primary Care Physician in 1 week. - You have a history of chronic back pain  CT Thoracic and Lumbar spine was requested and you refused to stay  we advise you to follow up with your Primary Care Physician in 1 week to get CT  Thoracic and Lumbar spine c/w lasix 80mg daily PO. Maintaining blood glucose level within normal range. - You have a history of diabetes  - Your HbA1c is 8.2  - You should continue to take your medication regimen regularly as prescribed  - Please follow up with your primary care provider/endocrinologist within a week of discharge.  - You need to continue monitoring your blood sugar levels closely.  - Please maintain healthy lifestyle by eating healthy diabetic regimen, weight loss and exercise regularly as tolerated. cessation advised  you need to conntinue on nicotine patch. c/w 80mg daily PO.

## 2018-11-21 NOTE — PROGRESS NOTE ADULT - ASSESSMENT
#ESRD oh HD  # non-compliance with HD meds  # chronic pain syndrome  #anemia of CKD at goal  CKDMBD    recs:  no objection to D/C after HD today  Asked to comply with  scheduled HD

## 2018-11-21 NOTE — DISCHARGE NOTE ADULT - CARE PLAN
Principal Discharge DX:	ESRD on dialysis  Assessment and plan of treatment:	Patient missed HD for 1 week, in metabolic acidosis however not hyper K and volume overloaded/resp distress at this time  ESRD with HD on TTS   s/p urgent HD on 06/19  c/w sensipar/renagel/NephroVite/Renal diet  nephro consulted Dr La.  Secondary Diagnosis:	Hyperkalemia  Secondary Diagnosis:	Back pain  Secondary Diagnosis:	Diabetes  Assessment and plan of treatment:	: takes 20-40U Lantus QHS, will give 30U QHS, Humalog and HiSS  A1C 8.2.  Secondary Diagnosis:	Smoking  Assessment and plan of treatment:	cessation advised  nicotine patch. Principal Discharge DX:	ESRD on dialysis  Goal:	Dialysis resume and reinstate Dialysis schedule  Assessment and plan of treatment:	- You  missed HD for 1 week, presenting with metabolic acidosis  dialysis schedule was reinsateted  nephro consulted Dr La.  - You are medically stable to be discharged from the hospital.  - You need to follow up with your Primary Care Physician in 1 week.  Secondary Diagnosis:	Back pain  Assessment and plan of treatment:	- You have a history of chronic back pain  CT Thoracic and Lumbar spine was requested and you refused to stay  we advise you to follow up with your Primary Care Physician in 1 week to get CT  Thoracic and Lumbar spine  Secondary Diagnosis:	Diabetes  Secondary Diagnosis:	Smoking  Assessment and plan of treatment:	cessation advised  nicotine patch.  Secondary Diagnosis:	HTN (hypertension)  Assessment and plan of treatment:	c/w lasix 80mg daily PO. Principal Discharge DX:	ESRD on dialysis  Goal:	Dialysis resume and reinstate Dialysis schedule  Assessment and plan of treatment:	- You  missed HD for 1 week, presenting with metabolic acidosis  dialysis schedule was reinsateted  nephro consulted Dr La.  - You are medically stable to be discharged from the hospital.  - You need to follow up with your Primary Care Physician in 1 week.  Secondary Diagnosis:	Back pain  Assessment and plan of treatment:	- You have a history of chronic back pain  CT Thoracic and Lumbar spine was requested and you refused to stay  we advise you to follow up with your Primary Care Physician in 1 week to get CT  Thoracic and Lumbar spine  Secondary Diagnosis:	Diabetes  Goal:	Maintaining blood glucose level within normal range.  Assessment and plan of treatment:	- You have a history of diabetes  - Your HbA1c is 8.2  - You should continue to take your medication regimen regularly as prescribed  - Please follow up with your primary care provider/endocrinologist within a week of discharge.  - You need to continue monitoring your blood sugar levels closely.  - Please maintain healthy lifestyle by eating healthy diabetic regimen, weight loss and exercise regularly as tolerated.  Secondary Diagnosis:	Smoking  Assessment and plan of treatment:	cessation advised  you need to conntinue on nicotine patch.  Secondary Diagnosis:	HTN (hypertension)  Assessment and plan of treatment:	c/w 80mg daily PO. Principal Discharge DX:	ESRD on dialysis  Goal:	Dialysis resume and reinstate Dialysis schedule  Assessment and plan of treatment:	- You  missed HD for 1 week, presenting with metabolic acidosis  dialysis schedule was reinsateted  nephro consulted Dr La.  - You are medically stable to be discharged from the hospital.  - You need to follow up with your Primary Care Physician in 1 week.  Secondary Diagnosis:	Back pain  Assessment and plan of treatment:	- You have a history of chronic back pain  CT Thoracic and Lumbar spine was requested and you refused to stay  we advise you to follow up with your Primary Care Physician in 1 week to get CT  Thoracic and Lumbar spine  Secondary Diagnosis:	Diabetes  Goal:	Maintaining blood glucose level within normal range.  Assessment and plan of treatment:	- You have a history of diabetes  - Your HbA1c is 8.2  - You should continue to take your medication regimen regularly as prescribed  - Please follow up with your primary care provider/endocrinologist within a week of discharge.  - You need to continue monitoring your blood sugar levels closely.  - Please maintain healthy lifestyle by eating healthy diabetic regimen, weight loss and exercise regularly as tolerated.  Secondary Diagnosis:	Smoking  Assessment and plan of treatment:	cessation advised  you need to conntinue on nicotine patch.

## 2018-11-21 NOTE — DISCHARGE NOTE ADULT - MEDICATION SUMMARY - MEDICATIONS TO TAKE
I will START or STAY ON the medications listed below when I get home from the hospital:    Neurontin 400 mg oral capsule  -- 1 cap(s) by mouth 2 times a day  -- Indication: For Back pain    mirtazapine 45 mg oral tablet  -- 1 tab(s) by mouth once a day  -- Indication: For Depression    escitalopram 10 mg oral tablet  -- 1 tab(s) by mouth once a day  -- Indication: For Depression    Lantus 100 units/mL subcutaneous solution  -- 20 unit(s) subcutaneous once a day (at bedtime)  -- Indication: For Diabetes    NovoLOG 100 units/mL subcutaneous solution  -- 10 unit(s) subcutaneous 3 times a day  -- Indication: For Diabetes    simvastatin 40 mg oral tablet  -- 1 tab(s) by mouth once a day (at bedtime)  -- Indication: For Hyperlipidemia    QUEtiapine 100 mg oral tablet  -- 2 tab(s) by mouth once a day  -- Indication: For Antipsychotic    Sensipar 30 mg oral tablet  -- 1 tab(s) by mouth once a day  -- Indication: For ESRD on dialysis    raNITIdine 150 mg oral tablet  -- 1 tab(s) by mouth 2 times a day, As Needed  -- Indication: For GERD    Renvela 800 mg oral tablet  -- 2 tab(s) by mouth 3 times a day (with meals)  -- Indication: For ESRD on dialysis    Velphoro 2500 mg (500 mg elemental iron) oral tablet, chewable  -- 1 tab(s) by mouth 3 times a day (with meals)  -- Indication: For ESRD on dialysis    omeprazole 20 mg oral delayed release capsule  -- 1 cap(s) by mouth once a day  -- Indication: For GERD    Nephro-Oly oral tablet  -- 1 tab(s) by mouth once a day  -- Indication: For Supplement I will START or STAY ON the medications listed below when I get home from the hospital:    Neurontin 400 mg oral capsule  -- 1 cap(s) by mouth 2 times a day  -- Indication: For Back pain    mirtazapine 45 mg oral tablet  -- 1 tab(s) by mouth once a day  -- Indication: For Depression    escitalopram 10 mg oral tablet  -- 1 tab(s) by mouth once a day  -- Indication: For Depression    Lantus 100 units/mL subcutaneous solution  -- 20 unit(s) subcutaneous once a day (at bedtime)  -- Indication: For Diabetes    NovoLOG 100 units/mL subcutaneous solution  -- 10 unit(s) subcutaneous 3 times a day  -- Indication: For Diabetes    simvastatin 40 mg oral tablet  -- 1 tab(s) by mouth once a day (at bedtime)  -- Indication: For Hyperlipidemia    QUEtiapine 100 mg oral tablet  -- 2 tab(s) by mouth once a day  -- Indication: For Antipsychotic    Sensipar 30 mg oral tablet  -- 1 tab(s) by mouth once a day  -- Indication: For ESRD on dialysis    raNITIdine 150 mg oral tablet  -- 1 tab(s) by mouth 2 times a day, As Needed  -- Indication: For GERD    Renvela 800 mg oral tablet  -- 2 tab(s) by mouth 3 times a day (with meals)  -- Indication: For ESRD on dialysis    Velphoro 2500 mg (500 mg elemental iron) oral tablet, chewable  -- 1 tab(s) by mouth 3 times a day (with meals)  -- Indication: For ESRD on dialysis    omeprazole 20 mg oral delayed release capsule  -- 1 cap(s) by mouth once a day  -- Indication: For GERD    Nicoderm C-Q 21 mg/24 hr transdermal film, extended release  -- 1 patch transdermally once a day   -- Do not take this drug if you are pregnant.  For external use only.  It is very important that you take or use this exactly as directed.  Do not skip doses or discontinue unless directed by your doctor.  Remove old patch prior to applying a new patch.    -- Indication: For Smoking    Nephro-Oly oral tablet  -- 1 tab(s) by mouth once a day  -- Indication: For Supplement

## 2018-11-21 NOTE — DISCHARGE NOTE ADULT - HOSPITAL COURSE
37 y/o M from Home with History of ESRD on HD TTS via left arm AV fistula/graft x 2 years, HTN, HLD, DM, chronic back pain presented with complaint of missing HD . His last HD was on Tuesday. He missed 3 sessions of HD due to "scheduling issues". Patient currently doesn't have a nephrologist and PCP. He reported chronic back pain for unknown duration, 10/10 in intensity, dull, radiating to left leg. He also complaint of abdominal pain, 3/10 in intensity, cramping in nature associated with diarrhea. he reports compliance with medication. denies any fever, chest pain, shortness of breath. In ED patient was in no acute distress. Vitals are stable. His Serum k+ is 6.0 w//o any EKG changes. he recieved cocktail for hyperkalemia including duoneb, calcium gluconate, insulin and dextrose 50%. CXR is clear. pt got 2 HD sessions during his stay  . Patient doesn't look volume overloaded, However has electrolyte imbalance. K+ of 6.0. EKG has no new changes. was given calcium gluconate, Insulin IV, dextrose and Albuterol in ED. Nephrology consulted Dr La. Given patient's improved clinical status and current hemodynamic stability, decision was made to discharge the patient. Patient is stable for discharge per attending and is advised to follow up with PCP as outpatient . Please refer to patient's complete medical chart with documents for a full hospital course, for this is only a brief summary.

## 2018-11-22 VITALS
HEART RATE: 89 BPM | RESPIRATION RATE: 18 BRPM | SYSTOLIC BLOOD PRESSURE: 129 MMHG | OXYGEN SATURATION: 98 % | DIASTOLIC BLOOD PRESSURE: 83 MMHG | TEMPERATURE: 98 F

## 2018-11-22 LAB
ALBUMIN SERPL ELPH-MCNC: 2.8 G/DL — LOW (ref 3.5–5)
ALP SERPL-CCNC: 110 U/L — SIGNIFICANT CHANGE UP (ref 40–120)
ALT FLD-CCNC: 21 U/L DA — SIGNIFICANT CHANGE UP (ref 10–60)
ANION GAP SERPL CALC-SCNC: 10 MMOL/L — SIGNIFICANT CHANGE UP (ref 5–17)
AST SERPL-CCNC: 14 U/L — SIGNIFICANT CHANGE UP (ref 10–40)
BASOPHILS # BLD AUTO: 0.1 K/UL — SIGNIFICANT CHANGE UP (ref 0–0.2)
BASOPHILS NFR BLD AUTO: 0.9 % — SIGNIFICANT CHANGE UP (ref 0–2)
BILIRUB SERPL-MCNC: 0.4 MG/DL — SIGNIFICANT CHANGE UP (ref 0.2–1.2)
BUN SERPL-MCNC: 55 MG/DL — HIGH (ref 7–18)
CALCIUM SERPL-MCNC: 10.1 MG/DL — SIGNIFICANT CHANGE UP (ref 8.4–10.5)
CHLORIDE SERPL-SCNC: 101 MMOL/L — SIGNIFICANT CHANGE UP (ref 96–108)
CO2 SERPL-SCNC: 25 MMOL/L — SIGNIFICANT CHANGE UP (ref 22–31)
CREAT SERPL-MCNC: 8.77 MG/DL — HIGH (ref 0.5–1.3)
EOSINOPHIL # BLD AUTO: 0.4 K/UL — SIGNIFICANT CHANGE UP (ref 0–0.5)
EOSINOPHIL NFR BLD AUTO: 4 % — SIGNIFICANT CHANGE UP (ref 0–6)
GLUCOSE BLDC GLUCOMTR-MCNC: 141 MG/DL — HIGH (ref 70–99)
GLUCOSE BLDC GLUCOMTR-MCNC: 177 MG/DL — HIGH (ref 70–99)
GLUCOSE SERPL-MCNC: 157 MG/DL — HIGH (ref 70–99)
HCT VFR BLD CALC: 37.9 % — LOW (ref 39–50)
HGB BLD-MCNC: 12.4 G/DL — LOW (ref 13–17)
LYMPHOCYTES # BLD AUTO: 1.8 K/UL — SIGNIFICANT CHANGE UP (ref 1–3.3)
LYMPHOCYTES # BLD AUTO: 17.8 % — SIGNIFICANT CHANGE UP (ref 13–44)
MAGNESIUM SERPL-MCNC: 2.3 MG/DL — SIGNIFICANT CHANGE UP (ref 1.6–2.6)
MCHC RBC-ENTMCNC: 30.2 PG — SIGNIFICANT CHANGE UP (ref 27–34)
MCHC RBC-ENTMCNC: 32.6 GM/DL — SIGNIFICANT CHANGE UP (ref 32–36)
MCV RBC AUTO: 92.7 FL — SIGNIFICANT CHANGE UP (ref 80–100)
MONOCYTES # BLD AUTO: 0.7 K/UL — SIGNIFICANT CHANGE UP (ref 0–0.9)
MONOCYTES NFR BLD AUTO: 6.9 % — SIGNIFICANT CHANGE UP (ref 2–14)
NEUTROPHILS # BLD AUTO: 7.1 K/UL — SIGNIFICANT CHANGE UP (ref 1.8–7.4)
NEUTROPHILS NFR BLD AUTO: 70.4 % — SIGNIFICANT CHANGE UP (ref 43–77)
PHOSPHATE SERPL-MCNC: 7.5 MG/DL — HIGH (ref 2.5–4.5)
PLATELET # BLD AUTO: 364 K/UL — SIGNIFICANT CHANGE UP (ref 150–400)
POTASSIUM SERPL-MCNC: 5.4 MMOL/L — HIGH (ref 3.5–5.3)
POTASSIUM SERPL-SCNC: 5.4 MMOL/L — HIGH (ref 3.5–5.3)
PROT SERPL-MCNC: 8.2 G/DL — SIGNIFICANT CHANGE UP (ref 6–8.3)
RBC # BLD: 4.08 M/UL — LOW (ref 4.2–5.8)
RBC # FLD: 14.5 % — SIGNIFICANT CHANGE UP (ref 10.3–14.5)
SODIUM SERPL-SCNC: 136 MMOL/L — SIGNIFICANT CHANGE UP (ref 135–145)
WBC # BLD: 10.1 K/UL — SIGNIFICANT CHANGE UP (ref 3.8–10.5)
WBC # FLD AUTO: 10.1 K/UL — SIGNIFICANT CHANGE UP (ref 3.8–10.5)

## 2018-11-22 RX ORDER — ONDANSETRON 8 MG/1
4 TABLET, FILM COATED ORAL ONCE
Qty: 0 | Refills: 0 | Status: COMPLETED | OUTPATIENT
Start: 2018-11-22 | End: 2018-11-22

## 2018-11-22 RX ORDER — NICOTINE POLACRILEX 2 MG
1 GUM BUCCAL
Qty: 14 | Refills: 0 | OUTPATIENT
Start: 2018-11-22 | End: 2018-12-05

## 2018-11-22 RX ADMIN — CINACALCET 30 MILLIGRAM(S): 30 TABLET, FILM COATED ORAL at 14:43

## 2018-11-22 RX ADMIN — Medication 1: at 12:19

## 2018-11-22 RX ADMIN — PANTOPRAZOLE SODIUM 40 MILLIGRAM(S): 20 TABLET, DELAYED RELEASE ORAL at 06:45

## 2018-11-22 RX ADMIN — ESCITALOPRAM OXALATE 10 MILLIGRAM(S): 10 TABLET, FILM COATED ORAL at 12:20

## 2018-11-22 RX ADMIN — SEVELAMER CARBONATE 1600 MILLIGRAM(S): 2400 POWDER, FOR SUSPENSION ORAL at 12:21

## 2018-11-22 RX ADMIN — MIRTAZAPINE 45 MILLIGRAM(S): 45 TABLET, ORALLY DISINTEGRATING ORAL at 12:21

## 2018-11-22 RX ADMIN — QUETIAPINE FUMARATE 200 MILLIGRAM(S): 200 TABLET, FILM COATED ORAL at 12:20

## 2018-11-22 RX ADMIN — GABAPENTIN 400 MILLIGRAM(S): 400 CAPSULE ORAL at 06:45

## 2018-11-22 RX ADMIN — Medication 10 UNIT(S): at 12:19

## 2018-11-22 RX ADMIN — ONDANSETRON 4 MILLIGRAM(S): 8 TABLET, FILM COATED ORAL at 08:25

## 2018-11-22 RX ADMIN — SEVELAMER CARBONATE 1600 MILLIGRAM(S): 2400 POWDER, FOR SUSPENSION ORAL at 08:27

## 2018-11-22 RX ADMIN — HEPARIN SODIUM 5000 UNIT(S): 5000 INJECTION INTRAVENOUS; SUBCUTANEOUS at 06:46

## 2018-11-22 NOTE — PROGRESS NOTE ADULT - SUBJECTIVE AND OBJECTIVE BOX
PGY1 Note discussed with Supervising Resident and Primary Attending.    Patient is a 38y old  Male who presents with a chief complaint of Missed HD (21 Nov 2018 13:51)      INTERVAL HPI/OVERNIGHT EVENTS :    MEDICATIONS  (STANDING):  cinacalcet 30 milliGRAM(s) Oral daily  escitalopram 10 milliGRAM(s) Oral daily  gabapentin 400 milliGRAM(s) Oral two times a day  heparin  Injectable 5000 Unit(s) SubCutaneous every 12 hours  insulin glargine Injectable (LANTUS) 20 Unit(s) SubCutaneous at bedtime  insulin lispro (HumaLOG) corrective regimen sliding scale   SubCutaneous three times a day before meals  insulin lispro Injectable (HumaLOG) 10 Unit(s) SubCutaneous three times a day before meals  mirtazapine 45 milliGRAM(s) Oral daily  pantoprazole    Tablet 40 milliGRAM(s) Oral before breakfast  QUEtiapine 200 milliGRAM(s) Oral daily  sevelamer hydrochloride 1600 milliGRAM(s) Oral three times a day with meals  simvastatin 40 milliGRAM(s) Oral at bedtime    MEDICATIONS  (PRN):      Allergies    aspirin (Swelling)  penicillins (Swelling)  shellfish (Unknown)    Intolerances        REVIEW OF SYSTEMS :  * CONSTITUTIONAL      : No Fever, Weight loss, or Fatigue  * EYES                             : No eye pain , Visual disturbances or Discharge  * RESPIRATORY             : No Cough, Wheezing, Chills or Hemoptysis; No shortness of breath  * CARDIOVASCULAR     : No Chest pain, Palpitations, Dizziness, or Leg swelling  * GASTROINTESTINAL  : No Abdominal or Epigastric pain. No Nausea, Vomiting or Hematemesis; No Diarrhea or Constipation. No Melena or Hematochezia.  * GENITOURINARY        : No Dysuria , Frequency , Haematuria   * NEUROLOGICAL          : No Headaches, Memory loss, Loss of trength, Numbness, or Tremors  * MUSCULOSKELETAL   : No Joint pain  * PSYCHIATRY                 : No Depression or Anxiety   * HEME/LYMPH              : No Easy Bruising or Bleeding gums  * SKIN                               : No Itching, Burning, Rashes, or Lesions     Vital Signs Last 24 Hrs  T(C): 36.8 (22 Nov 2018 05:50), Max: 37 (21 Nov 2018 14:55)  T(F): 98.2 (22 Nov 2018 05:50), Max: 98.6 (21 Nov 2018 14:55)  HR: 90 (22 Nov 2018 05:50) (74 - 95)  BP: 145/93 (22 Nov 2018 05:50) (106/68 - 145/93)  BP(mean): --  RR: 20 (22 Nov 2018 05:50) (17 - 20)  SpO2: 100% (22 Nov 2018 05:50) (95% - 100%)    PHYSICAL EXAM :  * GENERAL                 : NAD, Well-groomed, Well-developed  * HEAD                       :  Atraumatic, Normocephalic  * EYES                         : EOMI, PERRLA, Conjunctiva and Sclera clear  * ENT                           : Moist Mucous Membranes  * NECK                         : Supple, No JVD, Normal Thyroid  * CHEST/LUNG           : Clear to Auscultation bilaterally; No Rales, Rhonchi, Wheezing or Rubs  * HEART                       : Regular Rate and Rhythm; No murmurs, Rubs or gallops  * ABDOMEN                : Soft, Non-tender, Non-distended; Bowel Sounds present  * NERVOUS SYSTEM  :  Alert & Oriented X3, Good Concentration; Motor Strength 5/5 B/L UL LL ; DTRs 2+ Intact and Symmetric  * EXTREMITIES            :  2+ Peripheral Pulses, No clubbing, cyanosis, or edema  * SKIN                           : No Rashes or Lesions    LABS:                          11.6   11.2  )-----------( 353      ( 21 Nov 2018 07:40 )             35.9     11-21    135  |  96  |  67<H>  ----------------------------<  132<H>  5.1   |  24  |  9.93<H>    Ca    10.6<H>      21 Nov 2018 07:40  Phos  8.5     11-20  Mg     2.3     11-20    TPro  8.0  /  Alb  2.7<L>  /  TBili  0.3  /  DBili  x   /  AST  11  /  ALT  19  /  AlkPhos  103  11-21        CAPILLARY BLOOD GLUCOSE      POCT Blood Glucose.: 142 mg/dL (21 Nov 2018 20:54)  POCT Blood Glucose.: 136 mg/dL (21 Nov 2018 18:38)  POCT Blood Glucose.: 115 mg/dL (21 Nov 2018 17:11)  POCT Blood Glucose.: 138 mg/dL (21 Nov 2018 13:15)  POCT Blood Glucose.: 158 mg/dL (21 Nov 2018 11:48)  POCT Blood Glucose.: 166 mg/dL (21 Nov 2018 09:44)  POCT Blood Glucose.: 135 mg/dL (21 Nov 2018 07:59)      RADIOLOGY & ADDITIONAL TESTS:   No radiological imaging was required    Imaging Personally Reviewed   :  [ ] YES  [ ] NO    Consultant(s) Notes Reviewed :  [ ] YES  [ ] NO

## 2018-11-22 NOTE — PROGRESS NOTE ADULT - ASSESSMENT
38/M from home, works 2 jobs, PMhx of ESRD on HD, TTS, DM, HTN, current active smoker, morbidly obese comes in for eval after sent in by dialysis center for missed dialysis for 1 week.     stable vitals in the ED, no acute resp distress, comfortable, unable to get a CBC as pt is a very hard stick, labs to be drawn intra-dialysis, Chem: Bicarb of 13, BUN/Cr: 61/7/73, CXR: question slight CHF. requested Nephro Consult Dr La for urgent HD      ----------------------------------  Attending addendum:  ----------------------------------    Patient seen, examined, and interviewed by me earlier, case discussed with house staff and  , chart reviewed, agree with this note as reviewed by me.  See  full note above.  pt post two dialysis sessions  outpatient dialysis unite being set p  awaiting CT spine toeval source of pain  already discussed in detail re continue weight loss and compliance with diet, medications and close outpatient follow up  depending on results of CT, may be able to be DC in next 24-48 hrs  pain mgmt and renal follow up 38/M from home, works 2 jobs, PMhx of ESRD on HD, TTS, DM, HTN, current active smoker, morbidly obese comes in for eval after sent in by dialysis center for missed dialysis for 1 week.     stable vitals in the ED, no acute resp distress, comfortable, unable to get a CBC as pt is a very hard stick, labs to be drawn intra-dialysis, Chem: Bicarb of 13, BUN/Cr: 61/7/73, CXR: question slight CHF. requested Nephro Consult Dr La for urgent HD

## 2018-11-22 NOTE — PROGRESS NOTE ADULT - ASSESSMENT
M E D I C A L   A T T E N D I N G    F O L L O W    U P   N O T E                                     ------------------------------------------------------------------------------------------------    patient evaluated by me, case discussed with team, chart, medications, and physical exam reviewed, labs / tests  and vitals reviewed by me, as bellow.   Patient is stable for discharge today. overall feels well.        I had a long discussion with pt re HIV / AIDS med compliance and close follow up.  also discussed compliance with dialysis.  all questions answered   Patient to follow up with  nephrology and ID clinic ( has appointment)   See discharge document for full note.      ==================>> MEDICATIONS <<====================    cinacalcet 30 milliGRAM(s) Oral daily  escitalopram 10 milliGRAM(s) Oral daily  gabapentin 400 milliGRAM(s) Oral two times a day  heparin  Injectable 5000 Unit(s) SubCutaneous every 12 hours  insulin glargine Injectable (LANTUS) 20 Unit(s) SubCutaneous at bedtime  insulin lispro (HumaLOG) corrective regimen sliding scale   SubCutaneous three times a day before meals  insulin lispro Injectable (HumaLOG) 10 Unit(s) SubCutaneous three times a day before meals  mirtazapine 45 milliGRAM(s) Oral daily  pantoprazole    Tablet 40 milliGRAM(s) Oral before breakfast  QUEtiapine 200 milliGRAM(s) Oral daily  sevelamer hydrochloride 1600 milliGRAM(s) Oral three times a day with meals  simvastatin 40 milliGRAM(s) Oral at bedtime    ==================>> VITAL SIGNS <<==================    T(C): 36.7 (11-22-18 @ 11:16), Max: 37 (11-21-18 @ 14:55)  HR: 89 (11-22-18 @ 11:16) (80 - 95)  BP: 129/83 (11-22-18 @ 11:16) (106/68 - 145/93)  RR: 18 (11-22-18 @ 11:16) (17 - 20)  SpO2: 98% (11-22-18 @ 11:16) (96% - 100%)    POCT Blood Glucose.: 177 mg/dL (22 Nov 2018 11:44)  POCT Blood Glucose.: 141 mg/dL (22 Nov 2018 08:07)  POCT Blood Glucose.: 142 mg/dL (21 Nov 2018 20:54)  POCT Blood Glucose.: 136 mg/dL (21 Nov 2018 18:38)  POCT Blood Glucose.: 115 mg/dL (21 Nov 2018 17:11)  POCT Blood Glucose.: 138 mg/dL (21 Nov 2018 13:15)    I&O's Summary    21 Nov 2018 07:01  -  22 Nov 2018 07:00  --------------------------------------------------------  IN: 400 mL / OUT: 700 mL / NET: -300 mL     ==================>> LAB AND IMAGING <<==================                        12.4   10.1  )-----------( 364      ( 22 Nov 2018 07:41 )             37.9        136  |  101  |  55<H>  ----------------------------<  157<H>  5.4<H>   |  25  |  8.77<H>    Ca    10.1      22 Nov 2018 07:41  Phos  7.5     11-22  Mg     2.3     11-22    TPro  8.2  /  Alb  2.8<L>  /  TBili  0.4  /  DBili  x   /  AST  14  /  ALT  21  /  AlkPhos  110  11-22    HgA1C: 7.0  (11-21-18)             Creatinine:  8.77  <<==, 9.93  <<==, 12.90  <<==, 12.30  <<==   Sodium:   136  <==, 135  <==, 136  <==, 136  <==    WBC count:   10.1 <<== ,  11.2 <<== ,  12.5 <<==    Hemoglobin:   12.4 <<==,  11.6 <<==,  12.3 <<==   platelets:  364 <==, 353 <==, 383 <==

## 2018-11-27 PROCEDURE — 96374 THER/PROPH/DIAG INJ IV PUSH: CPT

## 2018-11-27 PROCEDURE — 83735 ASSAY OF MAGNESIUM: CPT

## 2018-11-27 PROCEDURE — 82962 GLUCOSE BLOOD TEST: CPT

## 2018-11-27 PROCEDURE — 80053 COMPREHEN METABOLIC PANEL: CPT

## 2018-11-27 PROCEDURE — 71046 X-RAY EXAM CHEST 2 VIEWS: CPT

## 2018-11-27 PROCEDURE — 86900 BLOOD TYPING SEROLOGIC ABO: CPT

## 2018-11-27 PROCEDURE — 83036 HEMOGLOBIN GLYCOSYLATED A1C: CPT

## 2018-11-27 PROCEDURE — 99285 EMERGENCY DEPT VISIT HI MDM: CPT | Mod: 25

## 2018-11-27 PROCEDURE — 94640 AIRWAY INHALATION TREATMENT: CPT

## 2018-11-27 PROCEDURE — 86901 BLOOD TYPING SEROLOGIC RH(D): CPT

## 2018-11-27 PROCEDURE — 93005 ELECTROCARDIOGRAM TRACING: CPT

## 2018-11-27 PROCEDURE — 84100 ASSAY OF PHOSPHORUS: CPT

## 2018-11-27 PROCEDURE — 86850 RBC ANTIBODY SCREEN: CPT

## 2018-11-27 PROCEDURE — 85027 COMPLETE CBC AUTOMATED: CPT

## 2018-11-27 PROCEDURE — 99261: CPT

## 2018-11-29 ENCOUNTER — INPATIENT (INPATIENT)
Facility: HOSPITAL | Age: 38
LOS: 6 days | Discharge: HOME | DRG: 602 | End: 2018-12-06
Attending: INTERNAL MEDICINE | Admitting: INTERNAL MEDICINE
Payer: MEDICARE

## 2018-11-29 VITALS
HEART RATE: 92 BPM | OXYGEN SATURATION: 97 % | SYSTOLIC BLOOD PRESSURE: 166 MMHG | DIASTOLIC BLOOD PRESSURE: 107 MMHG | TEMPERATURE: 98 F | RESPIRATION RATE: 16 BRPM

## 2018-11-29 DIAGNOSIS — Z98.89 OTHER SPECIFIED POSTPROCEDURAL STATES: Chronic | ICD-10-CM

## 2018-11-29 DIAGNOSIS — L03.90 CELLULITIS, UNSPECIFIED: ICD-10-CM

## 2018-11-29 DIAGNOSIS — E66.01 MORBID (SEVERE) OBESITY DUE TO EXCESS CALORIES: ICD-10-CM

## 2018-11-29 DIAGNOSIS — N18.6 END STAGE RENAL DISEASE: ICD-10-CM

## 2018-11-29 DIAGNOSIS — E11.9 TYPE 2 DIABETES MELLITUS WITHOUT COMPLICATIONS: ICD-10-CM

## 2018-11-29 DIAGNOSIS — F32.9 MAJOR DEPRESSIVE DISORDER, SINGLE EPISODE, UNSPECIFIED: ICD-10-CM

## 2018-11-29 DIAGNOSIS — Z29.9 ENCOUNTER FOR PROPHYLACTIC MEASURES, UNSPECIFIED: ICD-10-CM

## 2018-11-29 DIAGNOSIS — E83.52 HYPERCALCEMIA: ICD-10-CM

## 2018-11-29 LAB
ALBUMIN SERPL ELPH-MCNC: 2.8 G/DL — LOW (ref 3.5–5)
ALP SERPL-CCNC: 107 U/L — SIGNIFICANT CHANGE UP (ref 40–120)
ALT FLD-CCNC: 21 U/L DA — SIGNIFICANT CHANGE UP (ref 10–60)
ANION GAP SERPL CALC-SCNC: 13 MMOL/L — SIGNIFICANT CHANGE UP (ref 5–17)
AST SERPL-CCNC: 12 U/L — SIGNIFICANT CHANGE UP (ref 10–40)
BASOPHILS # BLD AUTO: 0.1 K/UL — SIGNIFICANT CHANGE UP (ref 0–0.2)
BASOPHILS NFR BLD AUTO: 0.8 % — SIGNIFICANT CHANGE UP (ref 0–2)
BILIRUB SERPL-MCNC: 0.3 MG/DL — SIGNIFICANT CHANGE UP (ref 0.2–1.2)
BUN SERPL-MCNC: 99 MG/DL — HIGH (ref 7–18)
CALCIUM SERPL-MCNC: 11.6 MG/DL — HIGH (ref 8.4–10.5)
CHLORIDE SERPL-SCNC: 105 MMOL/L — SIGNIFICANT CHANGE UP (ref 96–108)
CO2 SERPL-SCNC: 20 MMOL/L — LOW (ref 22–31)
CREAT SERPL-MCNC: 13.2 MG/DL — HIGH (ref 0.5–1.3)
EOSINOPHIL # BLD AUTO: 0.6 K/UL — HIGH (ref 0–0.5)
EOSINOPHIL NFR BLD AUTO: 4.2 % — SIGNIFICANT CHANGE UP (ref 0–6)
GLUCOSE BLDC GLUCOMTR-MCNC: 113 MG/DL — HIGH (ref 70–99)
GLUCOSE SERPL-MCNC: 128 MG/DL — HIGH (ref 70–99)
HCT VFR BLD CALC: 32.7 % — LOW (ref 39–50)
HGB BLD-MCNC: 10.4 G/DL — LOW (ref 13–17)
LACTATE SERPL-SCNC: 1.2 MMOL/L — SIGNIFICANT CHANGE UP (ref 0.7–2)
LYMPHOCYTES # BLD AUTO: 1.8 K/UL — SIGNIFICANT CHANGE UP (ref 1–3.3)
LYMPHOCYTES # BLD AUTO: 13.6 % — SIGNIFICANT CHANGE UP (ref 13–44)
MCHC RBC-ENTMCNC: 30.4 PG — SIGNIFICANT CHANGE UP (ref 27–34)
MCHC RBC-ENTMCNC: 31.7 GM/DL — LOW (ref 32–36)
MCV RBC AUTO: 95.7 FL — SIGNIFICANT CHANGE UP (ref 80–100)
MONOCYTES # BLD AUTO: 1 K/UL — HIGH (ref 0–0.9)
MONOCYTES NFR BLD AUTO: 7.6 % — SIGNIFICANT CHANGE UP (ref 2–14)
NEUTROPHILS # BLD AUTO: 9.9 K/UL — HIGH (ref 1.8–7.4)
NEUTROPHILS NFR BLD AUTO: 73.7 % — SIGNIFICANT CHANGE UP (ref 43–77)
PLATELET # BLD AUTO: 351 K/UL — SIGNIFICANT CHANGE UP (ref 150–400)
POTASSIUM SERPL-MCNC: 5.3 MMOL/L — SIGNIFICANT CHANGE UP (ref 3.5–5.3)
POTASSIUM SERPL-SCNC: 5.3 MMOL/L — SIGNIFICANT CHANGE UP (ref 3.5–5.3)
PROT SERPL-MCNC: 8.1 G/DL — SIGNIFICANT CHANGE UP (ref 6–8.3)
RBC # BLD: 3.42 M/UL — LOW (ref 4.2–5.8)
RBC # FLD: 14.2 % — SIGNIFICANT CHANGE UP (ref 10.3–14.5)
SODIUM SERPL-SCNC: 138 MMOL/L — SIGNIFICANT CHANGE UP (ref 135–145)
WBC # BLD: 13.4 K/UL — HIGH (ref 3.8–10.5)
WBC # FLD AUTO: 13.4 K/UL — HIGH (ref 3.8–10.5)

## 2018-11-29 PROCEDURE — 93971 EXTREMITY STUDY: CPT | Mod: 26,RT

## 2018-11-29 PROCEDURE — 99284 EMERGENCY DEPT VISIT MOD MDM: CPT

## 2018-11-29 PROCEDURE — 73590 X-RAY EXAM OF LOWER LEG: CPT | Mod: 26,RT

## 2018-11-29 RX ORDER — INSULIN LISPRO 100/ML
5 VIAL (ML) SUBCUTANEOUS
Qty: 0 | Refills: 0 | Status: DISCONTINUED | OUTPATIENT
Start: 2018-11-29 | End: 2018-12-06

## 2018-11-29 RX ORDER — INSULIN LISPRO 100/ML
VIAL (ML) SUBCUTANEOUS
Qty: 0 | Refills: 0 | Status: DISCONTINUED | OUTPATIENT
Start: 2018-11-29 | End: 2018-12-06

## 2018-11-29 RX ORDER — HYDROMORPHONE HYDROCHLORIDE 2 MG/ML
2 INJECTION INTRAMUSCULAR; INTRAVENOUS; SUBCUTANEOUS EVERY 6 HOURS
Qty: 0 | Refills: 0 | Status: DISCONTINUED | OUTPATIENT
Start: 2018-11-29 | End: 2018-11-30

## 2018-11-29 RX ORDER — GABAPENTIN 400 MG/1
400 CAPSULE ORAL
Qty: 0 | Refills: 0 | Status: DISCONTINUED | OUTPATIENT
Start: 2018-11-29 | End: 2018-12-06

## 2018-11-29 RX ORDER — QUETIAPINE FUMARATE 200 MG/1
200 TABLET, FILM COATED ORAL DAILY
Qty: 0 | Refills: 0 | Status: DISCONTINUED | OUTPATIENT
Start: 2018-11-29 | End: 2018-12-06

## 2018-11-29 RX ORDER — PANTOPRAZOLE SODIUM 20 MG/1
40 TABLET, DELAYED RELEASE ORAL
Qty: 0 | Refills: 0 | Status: DISCONTINUED | OUTPATIENT
Start: 2018-11-29 | End: 2018-12-06

## 2018-11-29 RX ORDER — INSULIN GLARGINE 100 [IU]/ML
15 INJECTION, SOLUTION SUBCUTANEOUS AT BEDTIME
Qty: 0 | Refills: 0 | Status: DISCONTINUED | OUTPATIENT
Start: 2018-11-29 | End: 2018-12-06

## 2018-11-29 RX ORDER — CINACALCET 30 MG/1
60 TABLET, FILM COATED ORAL ONCE
Qty: 0 | Refills: 0 | Status: COMPLETED | OUTPATIENT
Start: 2018-11-29 | End: 2018-11-29

## 2018-11-29 RX ORDER — MIRTAZAPINE 45 MG/1
45 TABLET, ORALLY DISINTEGRATING ORAL DAILY
Qty: 0 | Refills: 0 | Status: DISCONTINUED | OUTPATIENT
Start: 2018-11-29 | End: 2018-12-06

## 2018-11-29 RX ORDER — HYDROMORPHONE HYDROCHLORIDE 2 MG/ML
1 INJECTION INTRAMUSCULAR; INTRAVENOUS; SUBCUTANEOUS ONCE
Qty: 0 | Refills: 0 | Status: DISCONTINUED | OUTPATIENT
Start: 2018-11-29 | End: 2018-11-30

## 2018-11-29 RX ORDER — SEVELAMER CARBONATE 2400 MG/1
1600 POWDER, FOR SUSPENSION ORAL EVERY 8 HOURS
Qty: 0 | Refills: 0 | Status: DISCONTINUED | OUTPATIENT
Start: 2018-11-29 | End: 2018-12-06

## 2018-11-29 RX ORDER — VANCOMYCIN HCL 1 G
1000 VIAL (EA) INTRAVENOUS ONCE
Qty: 0 | Refills: 0 | Status: COMPLETED | OUTPATIENT
Start: 2018-11-29 | End: 2018-11-29

## 2018-11-29 RX ORDER — SIMVASTATIN 20 MG/1
40 TABLET, FILM COATED ORAL AT BEDTIME
Qty: 0 | Refills: 0 | Status: DISCONTINUED | OUTPATIENT
Start: 2018-11-29 | End: 2018-12-06

## 2018-11-29 RX ORDER — INSULIN GLARGINE 100 [IU]/ML
20 INJECTION, SOLUTION SUBCUTANEOUS AT BEDTIME
Qty: 0 | Refills: 0 | Status: DISCONTINUED | OUTPATIENT
Start: 2018-11-29 | End: 2018-11-29

## 2018-11-29 RX ORDER — INFLUENZA VIRUS VACCINE 15; 15; 15; 15 UG/.5ML; UG/.5ML; UG/.5ML; UG/.5ML
0.5 SUSPENSION INTRAMUSCULAR ONCE
Qty: 0 | Refills: 0 | Status: COMPLETED | OUTPATIENT
Start: 2018-11-29 | End: 2018-12-06

## 2018-11-29 RX ORDER — ESCITALOPRAM OXALATE 10 MG/1
10 TABLET, FILM COATED ORAL DAILY
Qty: 0 | Refills: 0 | Status: DISCONTINUED | OUTPATIENT
Start: 2018-11-29 | End: 2018-12-06

## 2018-11-29 RX ORDER — ACETAMINOPHEN 500 MG
650 TABLET ORAL EVERY 6 HOURS
Qty: 0 | Refills: 0 | Status: DISCONTINUED | OUTPATIENT
Start: 2018-11-29 | End: 2018-12-06

## 2018-11-29 RX ORDER — MORPHINE SULFATE 50 MG/1
6 CAPSULE, EXTENDED RELEASE ORAL ONCE
Qty: 0 | Refills: 0 | Status: DISCONTINUED | OUTPATIENT
Start: 2018-11-29 | End: 2018-11-29

## 2018-11-29 RX ORDER — CINACALCET 30 MG/1
30 TABLET, FILM COATED ORAL DAILY
Qty: 0 | Refills: 0 | Status: DISCONTINUED | OUTPATIENT
Start: 2018-11-29 | End: 2018-11-29

## 2018-11-29 RX ORDER — SODIUM CHLORIDE 9 MG/ML
250 INJECTION INTRAMUSCULAR; INTRAVENOUS; SUBCUTANEOUS ONCE
Qty: 0 | Refills: 0 | Status: COMPLETED | OUTPATIENT
Start: 2018-11-29 | End: 2018-11-29

## 2018-11-29 RX ORDER — CINACALCET 30 MG/1
30 TABLET, FILM COATED ORAL DAILY
Qty: 0 | Refills: 0 | Status: DISCONTINUED | OUTPATIENT
Start: 2018-11-30 | End: 2018-12-06

## 2018-11-29 RX ORDER — HYDROMORPHONE HYDROCHLORIDE 2 MG/ML
1 INJECTION INTRAMUSCULAR; INTRAVENOUS; SUBCUTANEOUS ONCE
Qty: 0 | Refills: 0 | Status: DISCONTINUED | OUTPATIENT
Start: 2018-11-29 | End: 2018-11-29

## 2018-11-29 RX ADMIN — Medication 250 MILLIGRAM(S): at 19:05

## 2018-11-29 RX ADMIN — HYDROMORPHONE HYDROCHLORIDE 2 MILLIGRAM(S): 2 INJECTION INTRAMUSCULAR; INTRAVENOUS; SUBCUTANEOUS at 20:20

## 2018-11-29 RX ADMIN — GABAPENTIN 400 MILLIGRAM(S): 400 CAPSULE ORAL at 22:20

## 2018-11-29 RX ADMIN — MORPHINE SULFATE 6 MILLIGRAM(S): 50 CAPSULE, EXTENDED RELEASE ORAL at 18:46

## 2018-11-29 RX ADMIN — QUETIAPINE FUMARATE 200 MILLIGRAM(S): 200 TABLET, FILM COATED ORAL at 22:20

## 2018-11-29 RX ADMIN — Medication 100 MILLIGRAM(S): at 14:28

## 2018-11-29 RX ADMIN — MORPHINE SULFATE 6 MILLIGRAM(S): 50 CAPSULE, EXTENDED RELEASE ORAL at 15:45

## 2018-11-29 RX ADMIN — Medication 100 MILLIGRAM(S): at 22:24

## 2018-11-29 RX ADMIN — CINACALCET 60 MILLIGRAM(S): 30 TABLET, FILM COATED ORAL at 21:33

## 2018-11-29 RX ADMIN — HYDROMORPHONE HYDROCHLORIDE 2 MILLIGRAM(S): 2 INJECTION INTRAMUSCULAR; INTRAVENOUS; SUBCUTANEOUS at 22:10

## 2018-11-29 RX ADMIN — HYDROMORPHONE HYDROCHLORIDE 1 MILLIGRAM(S): 2 INJECTION INTRAMUSCULAR; INTRAVENOUS; SUBCUTANEOUS at 17:18

## 2018-11-29 RX ADMIN — SODIUM CHLORIDE 250 MILLILITER(S): 9 INJECTION INTRAMUSCULAR; INTRAVENOUS; SUBCUTANEOUS at 14:28

## 2018-11-29 NOTE — H&P ADULT - PROBLEM SELECTOR PLAN 2
T-T-S , missed dialysis today   Elevated BUN/ Creatinine , but no criteria for urgent dialysis  No signs of overload   Will obtain CXR  HD in    Nephro Dr. La T-T-S , missed dialysis today   Elevated BUN/ Creatinine , but no criteria for urgent dialysis  No signs of overload   Will obtain CXR  HD in am   Nephro Dr. La  c/w shanice lopez

## 2018-11-29 NOTE — H&P ADULT - NSHPLABSRESULTS_GEN_ALL_CORE
10.4   13.4  )-----------( 351      ( 29 Nov 2018 14:15 )             32.7       11-29    138  |  105  |  99<H>  ----------------------------<  128<H>  5.3   |  20<L>  |  13.20<H>    Ca    11.6<H>      29 Nov 2018 14:15    TPro  8.1  /  Alb  2.8<L>  /  TBili  0.3  /  DBili  x   /  AST  12  /  ALT  21  /  AlkPhos  107  11-29

## 2018-11-29 NOTE — ED PROVIDER NOTE - OBJECTIVE STATEMENT
h/o obesity, DM, ESRD on dialysis (Tu Th Sat, last HD today) x 1.5 years presents to ED with pain, redness and swelling in right lower leg pain x 1 week getting worse. 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 No f/c or d/c.

## 2018-11-29 NOTE — H&P ADULT - PROBLEM SELECTOR PLAN 6
c/w home dose of Lantus , humalog   f/u a1c c/w lower doses of Lantus , humalog , resume full doses in the course of admission  c/w hss   f/u a1c

## 2018-11-29 NOTE — H&P ADULT - PROBLEM SELECTOR PLAN 1
Patient comes in with right LE pain , swelling , redness ; with open ulcer and reports discharge from it  Would start with Clindamycin for now , given missed dialysis today   f/u blood cultures   Consult podiatry   Pain mx  Elevate Extremity   Ultrasound doppler to r/o DVT Patient comes in with right LE pain , swelling , redness ; with open ulcer and reports discharge from it  Would start with Clindamycin for now , given missed dialysis today   could switch to vancomycin if spikes fever or any worsening signs of infection  f/u blood cultures   Consult podiatry   Pain mx  Elevate Extremity   Ultrasound doppler to r/o DVT

## 2018-11-29 NOTE — ED ADULT NURSE NOTE - NSIMPLEMENTINTERV_GEN_ALL_ED
Implemented All Universal Safety Interventions:  Alpha to call system. Call bell, personal items and telephone within reach. Instruct patient to call for assistance. Room bathroom lighting operational. Non-slip footwear when patient is off stretcher. Physically safe environment: no spills, clutter or unnecessary equipment. Stretcher in lowest position, wheels locked, appropriate side rails in place.

## 2018-11-29 NOTE — ED STATDOCS - OBJECTIVE STATEMENT
Telemedicine assessment was conducted (using real time 2 way audio-video technology) by Dr. Marjorie Loja located at [address] [place] [id number]  +++++++++++++++++++++++++++++++++++++++++++++++++++  History and Plan:   39 y/o M pt with PMHx of DM, ESRD on dialysis x 1.5 years and obesity presents to ED with pain, redness and swelling in L lower leg pain that has progressively worsened over the past week. Pt has not seen a doctor, pt was in ED on Friday when he noticed the pain and redness but didn't mention symptoms. Patient denies fever, chest pain, and shortness of breath. Last dialysis session was today.    Will order blood work and antibiotics.  Patient seen by me in intake for initial assessment and ordering. Physician on site to follow results and further evaluate and treat patient. Telemedicine assessment was conducted (using real time 2 way audio-video technology) by Dr. Marjorie Loja located at [address] [place] [id number]  +++++++++++++++++++++++++++++++++++++++++++++++++++  History and Plan:   39 y/o M pt with PMHx of obesity, DM, ESRD on dialysis (Tu Th Sat, last HD today) x 1.5 years presents to ED with pain, redness and swelling in right lower leg pain anteriorly that has progressively worsened over the past week. Pt has not seen a doctor, pt was in ED last when he noticed the pain and redness but didn't mention symptoms. Patient denies fever, chest pain, and shortness of breath.     On virtual and self exam pt appears well, nad, +erythema over right distal, anterior tib/fib, no calf ttp    Plan - likely cellulitis, will check labs, iv abx given immunocompromised state    Will order blood work and antibiotics.  Patient seen by me in intake for initial assessment and ordering. Physician on site to follow results and further evaluate and treat patient.

## 2018-11-29 NOTE — H&P ADULT - HISTORY OF PRESENT ILLNESS
Patient is a 37 y/o M from Home with History of ESRD on HD TTS via left arm AV fistula/graft x 2 years, HTN, HLD, DM, chronic back pain presented with complaint of right leg swelling and redness. Patient was admitted to [facility] and discharged on friday for missing dialysis. He then had a scrap on his leg and then developed redness and swelling at the site. Patient also complaints of chills , nausea , NBNP vomiting. No fever, no chest pain , no sob , no dizziness , no headache.     In Ed , BP: 186/110 mm hg , HR: 71 , Temp: 98.7 F  Cbc shows elevated white count with left shift  Bmp with elevated BUN/ Creatinine   Lactate WNL.

## 2018-11-29 NOTE — H&P ADULT - PROBLEM SELECTOR PLAN 3
Supportive measures   Needs outpatient Bariatric referral Corrected calcium level is 12.6   s/p 250 cc bolus in Ed   Would avoid further dose of fluid given no dialysis today   Would give one dose of calcitonin   HD in am   c/w home sensipar Corrected calcium level is 12.6   s/p 250 cc bolus in Ed   Would avoid further dose of fluid given no dialysis today   Would give one dose of sensipar 60 mg  c/w home dose of sensipar from morning   HD in am   c/w home sensipar

## 2018-11-29 NOTE — H&P ADULT - PROBLEM SELECTOR PLAN 7
IMPROVE VTE Individual Risk Assessment          RISK                                                          Points  [  ] Previous VTE                                                3  [  ] Thrombophilia                                             2  [  ] Lower limb paralysis                                   2        (unable to hold up >15 seconds)    [  ] Current Cancer                                             2         (within 6 months)  [x  ] Immobilization > 24 hrs                              1  [  ] ICU/CCU stay > 24 hours                             1  [  ] Age > 60                                                         1    IMPROVE VTE Score: 1   No indication of dvt ppx

## 2018-11-29 NOTE — H&P ADULT - ASSESSMENT
Patient is a 37 y/o M from Home with History of ESRD on HD TTS via left arm AV fistula/graft x 2 years, HTN, HLD, DM, chronic back pain presented with complaint of right leg swelling and redness. Patient was admitted to [facility] and discharged on friday for missing dialysis. He then had a scrap on his leg and then developed redness and swelling at the site. Patient also complaints of chills , nausea , NBNP vomiting. No fever, no chest pain , no sob , no dizziness , no headache.     In Ed , BP: 186/110 mm hg , HR: 71 , Temp: 98.7 F  Cbc shows elevated white count with left shift  Bmp with elevated BUN/ Creatinine   Lactate WNL.    Will admit to medicine for Cellulitis of Right LE.

## 2018-11-29 NOTE — H&P ADULT - PROBLEM SELECTOR PLAN 5
IMPROVE VTE Individual Risk Assessment          RISK                                                          Points  [  ] Previous VTE                                                3  [  ] Thrombophilia                                             2  [  ] Lower limb paralysis                                   2        (unable to hold up >15 seconds)    [  ] Current Cancer                                             2         (within 6 months)  [x  ] Immobilization > 24 hrs                              1  [  ] ICU/CCU stay > 24 hours                             1  [  ] Age > 60                                                         1    IMPROVE VTE Score: 1   No indication of dvt ppx Supportive measures   Needs outpatient Bariatric referral

## 2018-11-29 NOTE — H&P ADULT - NSHPPHYSICALEXAM_GEN_ALL_CORE
PHYSICAL EXAM:  GENERAL: NAD  HEENT: Normocephalic;  conjunctivae and sclerae clear; moist mucous membranes;   NECK : supple  CHEST/LUNG: Clear to auscultation bilaterally with good air entry   HEART: S1 S2  regular; no murmurs, gallops or rubs  ABDOMEN: Soft, Nontender, Nondistended; Bowel sounds present  EXTREMITIES: no cyanosis; no edema; no calf tenderness ; Left UE: AVF , thrills + ; Right Le : mid shaft area redness , tenderness with open 2*2 cm ulcer , no discharge ; pink granulation tissue.  SKIN: warm and dry; no rash  NERVOUS SYSTEM:  Awake and alert; Oriented  to place, person and time ; no new deficits

## 2018-11-30 DIAGNOSIS — L03.115 CELLULITIS OF RIGHT LOWER LIMB: ICD-10-CM

## 2018-11-30 LAB
ALBUMIN SERPL ELPH-MCNC: 2.6 G/DL — LOW (ref 3.5–5)
ALP SERPL-CCNC: 95 U/L — SIGNIFICANT CHANGE UP (ref 40–120)
ALT FLD-CCNC: 17 U/L DA — SIGNIFICANT CHANGE UP (ref 10–60)
ANION GAP SERPL CALC-SCNC: 14 MMOL/L — SIGNIFICANT CHANGE UP (ref 5–17)
AST SERPL-CCNC: 8 U/L — LOW (ref 10–40)
BASOPHILS # BLD AUTO: 0.1 K/UL — SIGNIFICANT CHANGE UP (ref 0–0.2)
BASOPHILS NFR BLD AUTO: 0.7 % — SIGNIFICANT CHANGE UP (ref 0–2)
BILIRUB SERPL-MCNC: 0.3 MG/DL — SIGNIFICANT CHANGE UP (ref 0.2–1.2)
BUN SERPL-MCNC: 98 MG/DL — HIGH (ref 7–18)
CALCIUM SERPL-MCNC: 11.1 MG/DL — HIGH (ref 8.4–10.5)
CHLORIDE SERPL-SCNC: 102 MMOL/L — SIGNIFICANT CHANGE UP (ref 96–108)
CO2 SERPL-SCNC: 19 MMOL/L — LOW (ref 22–31)
CREAT SERPL-MCNC: 13.2 MG/DL — HIGH (ref 0.5–1.3)
CRP SERPL-MCNC: 5.97 MG/DL — HIGH (ref 0–0.4)
EOSINOPHIL # BLD AUTO: 0.5 K/UL — SIGNIFICANT CHANGE UP (ref 0–0.5)
EOSINOPHIL NFR BLD AUTO: 4.3 % — SIGNIFICANT CHANGE UP (ref 0–6)
ERYTHROCYTE [SEDIMENTATION RATE] IN BLOOD: 103 MM/HR — HIGH (ref 0–15)
GLUCOSE BLDC GLUCOMTR-MCNC: 111 MG/DL — HIGH (ref 70–99)
GLUCOSE BLDC GLUCOMTR-MCNC: 119 MG/DL — HIGH (ref 70–99)
GLUCOSE BLDC GLUCOMTR-MCNC: 119 MG/DL — HIGH (ref 70–99)
GLUCOSE BLDC GLUCOMTR-MCNC: 130 MG/DL — HIGH (ref 70–99)
GLUCOSE BLDC GLUCOMTR-MCNC: 143 MG/DL — HIGH (ref 70–99)
GLUCOSE SERPL-MCNC: 114 MG/DL — HIGH (ref 70–99)
HCT VFR BLD CALC: 30.4 % — LOW (ref 39–50)
HGB BLD-MCNC: 9.9 G/DL — LOW (ref 13–17)
LYMPHOCYTES # BLD AUTO: 17.6 % — SIGNIFICANT CHANGE UP (ref 13–44)
LYMPHOCYTES # BLD AUTO: 2.1 K/UL — SIGNIFICANT CHANGE UP (ref 1–3.3)
MAGNESIUM SERPL-MCNC: 2.5 MG/DL — SIGNIFICANT CHANGE UP (ref 1.6–2.6)
MCHC RBC-ENTMCNC: 31.1 PG — SIGNIFICANT CHANGE UP (ref 27–34)
MCHC RBC-ENTMCNC: 32.4 GM/DL — SIGNIFICANT CHANGE UP (ref 32–36)
MCV RBC AUTO: 96 FL — SIGNIFICANT CHANGE UP (ref 80–100)
MONOCYTES # BLD AUTO: 1.2 K/UL — HIGH (ref 0–0.9)
MONOCYTES NFR BLD AUTO: 9.7 % — SIGNIFICANT CHANGE UP (ref 2–14)
NEUTROPHILS # BLD AUTO: 8.2 K/UL — HIGH (ref 1.8–7.4)
NEUTROPHILS NFR BLD AUTO: 67.8 % — SIGNIFICANT CHANGE UP (ref 43–77)
PHOSPHATE SERPL-MCNC: 10 MG/DL — SIGNIFICANT CHANGE UP (ref 2.5–4.5)
PLATELET # BLD AUTO: 319 K/UL — SIGNIFICANT CHANGE UP (ref 150–400)
POTASSIUM SERPL-MCNC: 5.3 MMOL/L — SIGNIFICANT CHANGE UP (ref 3.5–5.3)
POTASSIUM SERPL-SCNC: 5.3 MMOL/L — SIGNIFICANT CHANGE UP (ref 3.5–5.3)
PROT SERPL-MCNC: 7.8 G/DL — SIGNIFICANT CHANGE UP (ref 6–8.3)
RBC # BLD: 3.17 M/UL — LOW (ref 4.2–5.8)
RBC # FLD: 14.2 % — SIGNIFICANT CHANGE UP (ref 10.3–14.5)
SODIUM SERPL-SCNC: 135 MMOL/L — SIGNIFICANT CHANGE UP (ref 135–145)
WBC # BLD: 12 K/UL — HIGH (ref 3.8–10.5)
WBC # FLD AUTO: 12 K/UL — HIGH (ref 3.8–10.5)

## 2018-11-30 RX ORDER — OXYCODONE HYDROCHLORIDE 5 MG/1
5 TABLET ORAL EVERY 6 HOURS
Qty: 0 | Refills: 0 | Status: DISCONTINUED | OUTPATIENT
Start: 2018-11-30 | End: 2018-12-06

## 2018-11-30 RX ORDER — ERYTHROPOIETIN 10000 [IU]/ML
4000 INJECTION, SOLUTION INTRAVENOUS; SUBCUTANEOUS ONCE
Qty: 0 | Refills: 0 | Status: COMPLETED | OUTPATIENT
Start: 2018-11-30 | End: 2018-11-30

## 2018-11-30 RX ORDER — HYDROMORPHONE HYDROCHLORIDE 2 MG/ML
2 INJECTION INTRAMUSCULAR; INTRAVENOUS; SUBCUTANEOUS ONCE
Qty: 0 | Refills: 0 | Status: DISCONTINUED | OUTPATIENT
Start: 2018-11-30 | End: 2018-11-30

## 2018-11-30 RX ORDER — HYDROMORPHONE HYDROCHLORIDE 2 MG/ML
1 INJECTION INTRAMUSCULAR; INTRAVENOUS; SUBCUTANEOUS EVERY 6 HOURS
Qty: 0 | Refills: 0 | Status: DISCONTINUED | OUTPATIENT
Start: 2018-11-30 | End: 2018-12-06

## 2018-11-30 RX ADMIN — HYDROMORPHONE HYDROCHLORIDE 2 MILLIGRAM(S): 2 INJECTION INTRAMUSCULAR; INTRAVENOUS; SUBCUTANEOUS at 01:38

## 2018-11-30 RX ADMIN — Medication 100 MILLIGRAM(S): at 05:50

## 2018-11-30 RX ADMIN — HYDROMORPHONE HYDROCHLORIDE 1 MILLIGRAM(S): 2 INJECTION INTRAMUSCULAR; INTRAVENOUS; SUBCUTANEOUS at 17:38

## 2018-11-30 RX ADMIN — INSULIN GLARGINE 15 UNIT(S): 100 INJECTION, SOLUTION SUBCUTANEOUS at 00:39

## 2018-11-30 RX ADMIN — QUETIAPINE FUMARATE 200 MILLIGRAM(S): 200 TABLET, FILM COATED ORAL at 17:44

## 2018-11-30 RX ADMIN — SEVELAMER CARBONATE 1600 MILLIGRAM(S): 2400 POWDER, FOR SUSPENSION ORAL at 22:02

## 2018-11-30 RX ADMIN — Medication 5 UNIT(S): at 12:30

## 2018-11-30 RX ADMIN — HYDROMORPHONE HYDROCHLORIDE 2 MILLIGRAM(S): 2 INJECTION INTRAMUSCULAR; INTRAVENOUS; SUBCUTANEOUS at 08:46

## 2018-11-30 RX ADMIN — SIMVASTATIN 40 MILLIGRAM(S): 20 TABLET, FILM COATED ORAL at 22:10

## 2018-11-30 RX ADMIN — GABAPENTIN 400 MILLIGRAM(S): 400 CAPSULE ORAL at 17:47

## 2018-11-30 RX ADMIN — HYDROMORPHONE HYDROCHLORIDE 2 MILLIGRAM(S): 2 INJECTION INTRAMUSCULAR; INTRAVENOUS; SUBCUTANEOUS at 06:36

## 2018-11-30 RX ADMIN — INSULIN GLARGINE 15 UNIT(S): 100 INJECTION, SOLUTION SUBCUTANEOUS at 22:03

## 2018-11-30 RX ADMIN — PANTOPRAZOLE SODIUM 40 MILLIGRAM(S): 20 TABLET, DELAYED RELEASE ORAL at 06:01

## 2018-11-30 RX ADMIN — MIRTAZAPINE 45 MILLIGRAM(S): 45 TABLET, ORALLY DISINTEGRATING ORAL at 17:44

## 2018-11-30 RX ADMIN — ESCITALOPRAM OXALATE 10 MILLIGRAM(S): 10 TABLET, FILM COATED ORAL at 17:41

## 2018-11-30 RX ADMIN — GABAPENTIN 400 MILLIGRAM(S): 400 CAPSULE ORAL at 05:51

## 2018-11-30 RX ADMIN — Medication 5 UNIT(S): at 08:47

## 2018-11-30 RX ADMIN — Medication 100 MILLIGRAM(S): at 17:44

## 2018-11-30 RX ADMIN — CINACALCET 30 MILLIGRAM(S): 30 TABLET, FILM COATED ORAL at 17:41

## 2018-11-30 RX ADMIN — HYDROMORPHONE HYDROCHLORIDE 1 MILLIGRAM(S): 2 INJECTION INTRAMUSCULAR; INTRAVENOUS; SUBCUTANEOUS at 08:46

## 2018-11-30 RX ADMIN — SEVELAMER CARBONATE 1600 MILLIGRAM(S): 2400 POWDER, FOR SUSPENSION ORAL at 00:39

## 2018-11-30 RX ADMIN — ERYTHROPOIETIN 4000 UNIT(S): 10000 INJECTION, SOLUTION INTRAVENOUS; SUBCUTANEOUS at 13:14

## 2018-11-30 RX ADMIN — SEVELAMER CARBONATE 1600 MILLIGRAM(S): 2400 POWDER, FOR SUSPENSION ORAL at 17:42

## 2018-11-30 RX ADMIN — HYDROMORPHONE HYDROCHLORIDE 2 MILLIGRAM(S): 2 INJECTION INTRAMUSCULAR; INTRAVENOUS; SUBCUTANEOUS at 00:40

## 2018-11-30 RX ADMIN — HYDROMORPHONE HYDROCHLORIDE 1 MILLIGRAM(S): 2 INJECTION INTRAMUSCULAR; INTRAVENOUS; SUBCUTANEOUS at 19:33

## 2018-11-30 RX ADMIN — SIMVASTATIN 40 MILLIGRAM(S): 20 TABLET, FILM COATED ORAL at 00:41

## 2018-11-30 RX ADMIN — Medication 100 MILLIGRAM(S): at 22:10

## 2018-11-30 RX ADMIN — SEVELAMER CARBONATE 1600 MILLIGRAM(S): 2400 POWDER, FOR SUSPENSION ORAL at 06:00

## 2018-11-30 NOTE — PROGRESS NOTE ADULT - SUBJECTIVE AND OBJECTIVE BOX
NP Note discussed with  Primary Attending    Patient is a 38y old  Male who presents with a chief complaint of right leg swelling , redness (30 Nov 2018 12:13)      INTERVAL HPI/OVERNIGHT EVENTS: C/O severe pain anterior and posterior areas of right calf. Open ulcer right calf. Skin below ulcer is redden and warm to the touch.    MEDICATIONS  (STANDING):  cinacalcet 30 milliGRAM(s) Oral daily  clindamycin IVPB 600 milliGRAM(s) IV Intermittent every 8 hours  epoetin cyndie Injectable 4000 Unit(s) IV Push once  escitalopram 10 milliGRAM(s) Oral daily  gabapentin 400 milliGRAM(s) Oral two times a day  influenza   Vaccine 0.5 milliLiter(s) IntraMuscular once  insulin glargine Injectable (LANTUS) 15 Unit(s) SubCutaneous at bedtime  insulin lispro (HumaLOG) corrective regimen sliding scale   SubCutaneous Before meals and at bedtime  insulin lispro Injectable (HumaLOG) 5 Unit(s) SubCutaneous three times a day before meals  mirtazapine 45 milliGRAM(s) Oral daily  pantoprazole    Tablet 40 milliGRAM(s) Oral before breakfast  QUEtiapine 200 milliGRAM(s) Oral daily  sevelamer hydrochloride 1600 milliGRAM(s) Oral every 8 hours  simvastatin 40 milliGRAM(s) Oral at bedtime    MEDICATIONS  (PRN):  acetaminophen   Tablet .. 650 milliGRAM(s) Oral every 6 hours PRN Temp greater or equal to 38C (100.4F), Mild Pain (1 - 3)  HYDROmorphone  Injectable 1 milliGRAM(s) IV Push every 6 hours PRN Severe Pain (7 - 10)  oxyCODONE    IR 5 milliGRAM(s) Oral every 6 hours PRN Moderate Pain (4 - 6)      __________________________________________________  REVIEW OF SYSTEMS:    CONSTITUTIONAL: No fever,   EYES: no acute visual disturbances  NECK: No pain or stiffness  RESPIRATORY: No cough; No shortness of breath  CARDIOVASCULAR: No chest pain, no palpitations  GASTROINTESTINAL: No pain. No nausea or vomiting; No diarrhea   NEUROLOGICAL: No headache or numbness, no tremors  MUSCULOSKELETAL: +pain right lower leg  GENITOURINARY: no dysuria, no frequency, no hesitancy  PSYCHIATRY: no depression , no anxiety  ALL OTHER  ROS negative        Vital Signs Last 24 Hrs  T(C): 37.4 (30 Nov 2018 05:03), Max: 37.4 (30 Nov 2018 05:03)  T(F): 99.4 (30 Nov 2018 05:03), Max: 99.4 (30 Nov 2018 05:03)  HR: 88 (30 Nov 2018 05:03) (71 - 92)  BP: 137/68 (30 Nov 2018 05:03) (137/68 - 186/110)  BP(mean): --  RR: 20 (30 Nov 2018 05:03) (16 - 20)  SpO2: 96% (30 Nov 2018 05:03) (96% - 100%)    ________________________________________________  PHYSICAL EXAM:  GENERAL: NAD  HEENT: Normocephalic;  conjunctivae and sclerae clear; moist mucous membranes;   NECK : supple  CHEST/LUNG: Clear to auscultation bilaterally with good air entry   HEART: S1 S2  regular; no murmurs, gallops or rubs  ABDOMEN: Soft, Nontender, Nondistended; Bowel sounds present  EXTREMITIES: no cyanosis; no edema; no calf tenderness  SKIN: warm and dry; no rash  NERVOUS SYSTEM:  Awake and alert; Oriented  to place, person and time ; no new deficits    _________________________________________________  LABS:                        9.9    12.0  )-----------( 319      ( 30 Nov 2018 06:21 )             30.4     11-30    135  |  102  |  98<H>  ----------------------------<  114<H>  5.3   |  19<L>  |  13.20<H>    Ca    11.1<H>      30 Nov 2018 06:21  Phos  10.0     11-30  Mg     2.5     11-30    TPro  7.8  /  Alb  2.6<L>  /  TBili  0.3  /  DBili  x   /  AST  8<L>  /  ALT  17  /  AlkPhos  95  11-30        CAPILLARY BLOOD GLUCOSE      POCT Blood Glucose.: 119 mg/dL (30 Nov 2018 11:38)  POCT Blood Glucose.: 130 mg/dL (30 Nov 2018 08:25)  POCT Blood Glucose.: 111 mg/dL (30 Nov 2018 00:27)  POCT Blood Glucose.: 113 mg/dL (29 Nov 2018 22:11)        RADIOLOGY & ADDITIONAL TESTS:    Imaging Personally Reviewed:  YES/NO    Consultant(s) Notes Reviewed:   YES/ No    Care Discussed with Consultants :     Plan of care was discussed with patient and /or primary care giver; all questions and concerns were addressed and care was aligned with patient's wishes.

## 2018-11-30 NOTE — CONSULT NOTE ADULT - SUBJECTIVE AND OBJECTIVE BOX
NP Note  Pain Management   Patient is a 39 y/o M from Home with History of ESRD on HD TTS via left arm AV fistula/graft x 2 years, HTN, HLD, DM, chronic back pain presented with complaint of right leg swelling and redness. Patient was admitted to [facility] and discharged on friday for missing dialysis. He then had a scrap on his leg and then developed redness and swelling at the site. Patient also complaints of chills , nausea , NBNP vomiting. No fever, no chest pain , no sob , no dizziness , no headache (30 Nov 2018 08:33)    Patient is 39 y/o male with pmhx as above presented with right LE pain, redness and swelling associated with fever, chill, generalized malaise for few days. States injured his right arterial shin at work last week and few days later developed redness, swelling and pain to right lower leg. States [place] [facility] [place] to [place] or IV antibiotics.  Patient was admitted for right leg cellulitis.   Patient seen at bedside co right leg pain, severe in intensity 7/10, throbbing, shooting up to right upper leg. States pain is barely relieved with Dilaudid 2mg po. Pt denies taking any pain medications at home. Denies numbness, focal weakness, tingling, vomiting.   right leg xray and right LE doppler negative           INTERVAL HPI/OVERNIGHT EVENTS: no new complaints    MEDICATIONS  (STANDING):  cinacalcet 30 milliGRAM(s) Oral daily  clindamycin IVPB 600 milliGRAM(s) IV Intermittent every 8 hours  epoetin cyndie Injectable 4000 Unit(s) IV Push once  escitalopram 10 milliGRAM(s) Oral daily  gabapentin 400 milliGRAM(s) Oral two times a day  influenza   Vaccine 0.5 milliLiter(s) IntraMuscular once  insulin glargine Injectable (LANTUS) 15 Unit(s) SubCutaneous at bedtime  insulin lispro (HumaLOG) corrective regimen sliding scale   SubCutaneous Before meals and at bedtime  insulin lispro Injectable (HumaLOG) 5 Unit(s) SubCutaneous three times a day before meals  mirtazapine 45 milliGRAM(s) Oral daily  pantoprazole    Tablet 40 milliGRAM(s) Oral before breakfast  QUEtiapine 200 milliGRAM(s) Oral daily  sevelamer hydrochloride 1600 milliGRAM(s) Oral every 8 hours  simvastatin 40 milliGRAM(s) Oral at bedtime    MEDICATIONS  (PRN):  acetaminophen   Tablet .. 650 milliGRAM(s) Oral every 6 hours PRN Temp greater or equal to 38C (100.4F), Mild Pain (1 - 3)  HYDROmorphone  Injectable 1 milliGRAM(s) IV Push every 6 hours PRN Severe Pain (7 - 10)  oxyCODONE    IR 5 milliGRAM(s) Oral every 6 hours PRN Moderate Pain (4 - 6)      __________________________________________________  REVIEW OF SYSTEMS:    CONSTITUTIONAL: No fever,   EYES: no acute visual disturbances  NECK: No pain or stiffness  RESPIRATORY: No cough; No shortness of breath  CARDIOVASCULAR: No chest pain, no palpitations  GASTROINTESTINAL: No pain. No nausea or vomiting; No diarrhea   NEUROLOGICAL: No headache or numbness, no tremors  MUSCULOSKELETAL: pain to right LE   GENITOURINARY:  on HD   PSYCHIATRY: no depression , no anxiety  ALL OTHER  ROS negative        Vital Signs Last 24 Hrs  T(C): 37.4 (30 Nov 2018 05:03), Max: 37.4 (30 Nov 2018 05:03)  T(F): 99.4 (30 Nov 2018 05:03), Max: 99.4 (30 Nov 2018 05:03)  HR: 88 (30 Nov 2018 05:03) (71 - 92)  BP: 137/68 (30 Nov 2018 05:03) (137/68 - 186/110)  BP(mean): --  RR: 20 (30 Nov 2018 05:03) (16 - 20)  SpO2: 96% (30 Nov 2018 05:03) (96% - 100%)    ________________________________________________  PHYSICAL EXAM:  GENERAL: NAD, appears uncomfortable   CHEST/LUNG: Clear to auscultation bilaterally with good air entry   HEART: S1 S2  regular; no murmurs, gallops or rubs  ABDOMEN: Soft, Nontender, Nondistended; Bowel sounds present  EXTREMITIES: right lower extr anterior shin  erythema with swelling tender to palpation extending laterally and medially, not circumferential,  no calf tenderness  SKIN: warm and dry; no rash  NERVOUS SYSTEM:  Awake and alert; Oriented  to place, person and time ; no new deficits    _________________________________________________  LABS:                        9.9    12.0  )-----------( 319      ( 30 Nov 2018 06:21 )             30.4     11-30    135  |  102  |  98<H>  ----------------------------<  114<H>  5.3   |  19<L>  |  13.20<H>    Ca    11.1<H>      30 Nov 2018 06:21  Phos  10.0     11-30  Mg     2.5     11-30    TPro  7.8  /  Alb  2.6<L>  /  TBili  0.3  /  DBili  x   /  AST  8<L>  /  ALT  17  /  AlkPhos  95  11-30        CAPILLARY BLOOD GLUCOSE      POCT Blood Glucose.: 119 mg/dL (30 Nov 2018 11:38)  POCT Blood Glucose.: 130 mg/dL (30 Nov 2018 08:25)  POCT Blood Glucose.: 111 mg/dL (30 Nov 2018 00:27)  POCT Blood Glucose.: 113 mg/dL (29 Nov 2018 22:11)        RADIOLOGY & ADDITIONAL TESTS:    Imaging Personally Reviewed:  YES/NO    Consultant(s) Notes Reviewed:   YES/ No    Care Discussed with Consultants :     Plan of care was discussed with patient and /or primary care giver; all questions and concerns were addressed and care was aligned with patient's wishes.

## 2018-11-30 NOTE — CONSULT NOTE ADULT - SUBJECTIVE AND OBJECTIVE BOX
Patient is a 38y old  Male who presents with a chief complaint of right leg swelling , redness (29 Nov 2018 18:21)      HPI:  Patient is a 39 y/o M from Home with History of ESRD on HD TTS via left arm AV fistula/graft x 2 years, HTN, HLD, DM, chronic back pain presented with complaint of right leg swelling and redness. Patient was admitted to [facility] and discharged on friday for missing dialysis. He then had a scrap on his leg and then developed redness and swelling at the site. Patient also complaints of chills , nausea , NBNP vomiting. No fever, no chest pain , no sob , no dizziness , no headache.     In Ed , BP: 186/110 mm hg , HR: 71 , Temp: 98.7 F  Cbc shows elevated white count with left shift  Bmp with elevated BUN/ Creatinine   Lactate WNL. (29 Nov 2018 18:21)    Pt seen bedside. Pt states his R leg has been red and swollen for a few days. Pt states he vomited yesterday and today. Pt states the R leg is painful as well.     PMH: ESRD on dialysis  Morbid obesity with BMI of 40.0-44.9, adult  Diabetes    Allergies: aspirin (Swelling)  penicillins (Swelling)  shellfish (Unknown)    Medications: acetaminophen   Tablet .. 650 milliGRAM(s) Oral every 6 hours PRN  cinacalcet 30 milliGRAM(s) Oral daily  clindamycin IVPB 600 milliGRAM(s) IV Intermittent every 8 hours  escitalopram 10 milliGRAM(s) Oral daily  gabapentin 400 milliGRAM(s) Oral two times a day  HYDROmorphone   Tablet 2 milliGRAM(s) Oral every 6 hours PRN  influenza   Vaccine 0.5 milliLiter(s) IntraMuscular once  insulin glargine Injectable (LANTUS) 15 Unit(s) SubCutaneous at bedtime  insulin lispro (HumaLOG) corrective regimen sliding scale   SubCutaneous Before meals and at bedtime  insulin lispro Injectable (HumaLOG) 5 Unit(s) SubCutaneous three times a day before meals  mirtazapine 45 milliGRAM(s) Oral daily  pantoprazole    Tablet 40 milliGRAM(s) Oral before breakfast  QUEtiapine 200 milliGRAM(s) Oral daily  sevelamer hydrochloride 1600 milliGRAM(s) Oral every 8 hours  simvastatin 40 milliGRAM(s) Oral at bedtime    FH:Family history of diabetes mellitus (Mother)    PSX: H/O hernia repair    SH: acetaminophen   Tablet .. 650 milliGRAM(s) Oral every 6 hours PRN  cinacalcet 30 milliGRAM(s) Oral daily  clindamycin IVPB 600 milliGRAM(s) IV Intermittent every 8 hours  escitalopram 10 milliGRAM(s) Oral daily  gabapentin 400 milliGRAM(s) Oral two times a day  HYDROmorphone   Tablet 2 milliGRAM(s) Oral every 6 hours PRN  influenza   Vaccine 0.5 milliLiter(s) IntraMuscular once  insulin glargine Injectable (LANTUS) 15 Unit(s) SubCutaneous at bedtime  insulin lispro (HumaLOG) corrective regimen sliding scale   SubCutaneous Before meals and at bedtime  insulin lispro Injectable (HumaLOG) 5 Unit(s) SubCutaneous three times a day before meals  mirtazapine 45 milliGRAM(s) Oral daily  pantoprazole    Tablet 40 milliGRAM(s) Oral before breakfast  QUEtiapine 200 milliGRAM(s) Oral daily  sevelamer hydrochloride 1600 milliGRAM(s) Oral every 8 hours  simvastatin 40 milliGRAM(s) Oral at bedtime      Vital Signs Last 24 Hrs  T(C): 37.4 (30 Nov 2018 05:03), Max: 37.4 (30 Nov 2018 05:03)  T(F): 99.4 (30 Nov 2018 05:03), Max: 99.4 (30 Nov 2018 05:03)  HR: 88 (30 Nov 2018 05:03) (71 - 92)  BP: 137/68 (30 Nov 2018 05:03) (137/68 - 186/110)  BP(mean): --  RR: 20 (30 Nov 2018 05:03) (16 - 20)  SpO2: 96% (30 Nov 2018 05:03) (96% - 100%)    LABS                        9.9    12.0  )-----------( 319      ( 30 Nov 2018 06:21 )             30.4               11-30    135  |  102  |  98<H>  ----------------------------<  114<H>  5.3   |  19<L>  |  13.20<H>    Ca    11.1<H>      30 Nov 2018 06:21  Phos  10.0     11-30  Mg     2.5     11-30    TPro  7.8  /  Alb  2.6<L>  /  TBili  0.3  /  DBili  x   /  AST  8<L>  /  ALT  17  /  AlkPhos  95  11-30        PHYSICAL EXAM  GEN: PRESTON JOHNSON JR is a pleasant well-nourished, well developed 38y Male in no acute distress, alert awake, and oriented to person, place and time.   LE Focused:    Vasc:  DP/PT palpable, CFT <3 sec, TG warm to cool, Edema to R leg  Derm: Erythema noted to R light, small superficial eschar to anterior R leg from an old cut  Neuro: Protective sensation intact      < from: Xray Tibia + Fibula 2 Views, Right (11.29.18 @ 20:13) >    EXAM:  LEG AP&LAT - RIGHT                            PROCEDURE DATE:  11/29/2018          INTERPRETATION:  CLINICAL INDICATION:  Right lower extremity cellulitis;   evaluate for osteomyelitis.    COMPARISON:  None    TECHNIQUE:  AP and lateral radiographs of the right lower leg performed.    FINDINGS:  There is no evidence for acute or chronic fracture deformity   of the right tibia or fibula. No regions of osteolysis or osteosclerosis   noted. Knee and ankle articulations appear intact. Soft tissue   calcifications are evident. No retained metallic foreign bodies are noted.    IMPRESSION:  As above.        LISA KAPLAN   This document has been electronically signed. Nov 29 2018  8:25PM              < end of copied text >          A:  R leg cellulitis    P:  Patient evaluated, chart reviewed.   X-ray reviewed as above  IV abx per ID   no dressing needed at this time  Podiatry to follow while in house  Seen With Dr. Tse

## 2018-11-30 NOTE — PROGRESS NOTE ADULT - SUBJECTIVE AND OBJECTIVE BOX
HPI:  Patient is a 37 y/o M from Home with History of ESRD on HD TTS via left arm AV fistula/graft x 2 years, HTN, HLD, DM, chronic back pain presented with complaint of right leg swelling and redness. Patient was admitted to [facility] and discharged on friday for missing dialysis. He then had a scrap on his leg and then developed redness and swelling at the site. Patient also complaints of chills , nausea , NBNP vomiting. No fever, no chest pain , no sob , no dizziness , no headache.     In Ed , BP: 186/110 mm hg , HR: 71 , Temp: 98.7 F  Cbc shows elevated white count with left shift  Bmp with elevated BUN/ Creatinine   Lactate WNL. (29 Nov 2018 18:21)      Patient is a 38y old  Male who presents with a chief complaint of right leg swelling , redness (30 Nov 2018 12:36)      INTERVAL HPI/OVERNIGHT EVENTS:  T(C): 36.7 (11-30-18 @ 16:43), Max: 37.4 (11-30-18 @ 05:03)  HR: 83 (11-30-18 @ 16:43) (82 - 88)  BP: 128/81 (11-30-18 @ 16:43) (128/81 - 162/99)  RR: 19 (11-30-18 @ 16:43) (17 - 20)  SpO2: 96% (11-30-18 @ 05:03) (96% - 100%)  Wt(kg): --  I&O's Summary      REVIEW OF SYSTEMS: denies fever, chills, SOB, palpitations, chest pain, abdominal pain, nausea, vomitting, diarrhea, constipation, dizziness    MEDICATIONS  (STANDING):  cinacalcet 30 milliGRAM(s) Oral daily  clindamycin IVPB 600 milliGRAM(s) IV Intermittent every 8 hours  escitalopram 10 milliGRAM(s) Oral daily  gabapentin 400 milliGRAM(s) Oral two times a day  influenza   Vaccine 0.5 milliLiter(s) IntraMuscular once  insulin glargine Injectable (LANTUS) 15 Unit(s) SubCutaneous at bedtime  insulin lispro (HumaLOG) corrective regimen sliding scale   SubCutaneous Before meals and at bedtime  insulin lispro Injectable (HumaLOG) 5 Unit(s) SubCutaneous three times a day before meals  mirtazapine 45 milliGRAM(s) Oral daily  pantoprazole    Tablet 40 milliGRAM(s) Oral before breakfast  QUEtiapine 200 milliGRAM(s) Oral daily  sevelamer hydrochloride 1600 milliGRAM(s) Oral every 8 hours  simvastatin 40 milliGRAM(s) Oral at bedtime    MEDICATIONS  (PRN):  acetaminophen   Tablet .. 650 milliGRAM(s) Oral every 6 hours PRN Temp greater or equal to 38C (100.4F), Mild Pain (1 - 3)  HYDROmorphone  Injectable 1 milliGRAM(s) IV Push every 6 hours PRN Severe Pain (7 - 10)  oxyCODONE    IR 5 milliGRAM(s) Oral every 6 hours PRN Moderate Pain (4 - 6)      PHYSICAL EXAM:  GENERAL: NAD, well-groomed, well-developed  HEAD:  Atraumatic, Normocephalic  EYES: EOMI, PERRLA, conjunctiva and sclera clear  ENMT: No tonsillar erythema, exudates, or enlargement; Moist mucous membranes, Good dentition, No lesions  NECK: Supple, No JVD, Normal thyroid  NERVOUS SYSTEM:  Alert & Oriented X3, Good concentration; Motor Strength 5/5 B/L upper and lower extremities; DTRs 2+ intact and symmetric  CHEST/LUNG: Clear to percussion bilaterally; No rales, rhonchi, wheezing, or rubs  HEART: Regular rate and rhythm; No murmurs, rubs, or gallops  ABDOMEN: Soft, Nontender, Nondistended; Bowel sounds present  EXTREMITIES:  2+ Peripheral Pulses, No clubbing, cyanosis, or edema  LYMPH: No lymphadenopathy noted  SKIN: No rashes or lesions  LABS:                        9.9    12.0  )-----------( 319      ( 30 Nov 2018 06:21 )             30.4     11-30    135  |  102  |  98<H>  ----------------------------<  114<H>  5.3   |  19<L>  |  13.20<H>    Ca    11.1<H>      30 Nov 2018 06:21  Phos  10.0     11-30  Mg     2.5     11-30    TPro  7.8  /  Alb  2.6<L>  /  TBili  0.3  /  DBili  x   /  AST  8<L>  /  ALT  17  /  AlkPhos  95  11-30        CAPILLARY BLOOD GLUCOSE      POCT Blood Glucose.: 119 mg/dL (30 Nov 2018 16:04)  POCT Blood Glucose.: 119 mg/dL (30 Nov 2018 11:38)  POCT Blood Glucose.: 130 mg/dL (30 Nov 2018 08:25)  POCT Blood Glucose.: 111 mg/dL (30 Nov 2018 00:27)  POCT Blood Glucose.: 113 mg/dL (29 Nov 2018 22:11)

## 2018-11-30 NOTE — CONSULT NOTE ADULT - SUBJECTIVE AND OBJECTIVE BOX
38 yr old male on with ESRD on HD TTS at [facility]. Very non-compliant with dialysis schedule and misses dialysis very frequently.  Was recently admitted in the in [place] after missing dialysis for a few days. Last dialysis was [place] last week. 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 Started on antibiotics and renal consulted as pt on hemodialysis.    PAST MEDICAL & SURGICAL HISTORY:  ESRD on dialysis  Morbid obesity with BMI of 40.0-44.9, adult  Diabetes  H/O hernia repair    aspirin (Swelling)  penicillins (Swelling)  shellfish (Unknown)    Home Medications Reviewed  Hospital Medications:   MEDICATIONS  (STANDING):  cinacalcet 30 milliGRAM(s) Oral daily  clindamycin IVPB 600 milliGRAM(s) IV Intermittent every 8 hours  escitalopram 10 milliGRAM(s) Oral daily  gabapentin 400 milliGRAM(s) Oral two times a day  influenza   Vaccine 0.5 milliLiter(s) IntraMuscular once  insulin glargine Injectable (LANTUS) 15 Unit(s) SubCutaneous at bedtime  insulin lispro (HumaLOG) corrective regimen sliding scale   SubCutaneous Before meals and at bedtime  insulin lispro Injectable (HumaLOG) 5 Unit(s) SubCutaneous three times a day before meals  mirtazapine 45 milliGRAM(s) Oral daily  pantoprazole    Tablet 40 milliGRAM(s) Oral before breakfast  QUEtiapine 200 milliGRAM(s) Oral daily  sevelamer hydrochloride 1600 milliGRAM(s) Oral every 8 hours  simvastatin 40 milliGRAM(s) Oral at bedtime    SOCIAL HISTORY:  Denies ETOh,Smoking,   FAMILY HISTORY:  Family history of diabetes mellitus (Mother)        VITALS:  T(F): 98 (11-30-18 @ 16:43), Max: 99.4 (11-30-18 @ 05:03)  HR: 83 (11-30-18 @ 16:43)  BP: 128/81 (11-30-18 @ 16:43)  RR: 19 (11-30-18 @ 16:43)  SpO2: 96% (11-30-18 @ 05:03)  Wt(kg): --      PHYSICAL EXAM:  Constitutional: obese+  HEENT: anicteric sclera, oropharynx clear.  Neck: No JVD  Respiratory: CTAB, no wheezes, rales or rhonchi  Cardiovascular: S1, S2, RRR  Gastrointestinal: BS+, soft, NT/ND  Extremities:  No peripheral edema  Neurological: A/O x 3, no focal deficits  Psychiatric: Normal mood, normal affect  : No CVA tenderness. No hernandez.   Skin: No rashes  Vascular Access: LUEAVF cannulated     LABS:  11-30    135  |  102  |  98<H>  ----------------------------<  114<H>  5.3   |  19<L>  |  13.20<H>    Ca    11.1<H>      30 Nov 2018 06:21  Phos  10.0     11-30  Mg     2.5     11-30    TPro  7.8  /  Alb  2.6<L>  /  TBili  0.3  /  DBili      /  AST  8<L>  /  ALT  17  /  AlkPhos  95  11-30    Creatinine Trend: 13.20 <--, 13.20 <--                        9.9    12.0  )-----------( 319      ( 30 Nov 2018 06:21 )             30.4     Urine Studies:      RADIOLOGY & ADDITIONAL STUDIES:

## 2018-11-30 NOTE — PROGRESS NOTE ADULT - ASSESSMENT
38 yr old male morbid obese, htn, dm, esrd on hd , non compliant  h/o chr LBP  came with swelling and pain in rt lower extremity from  a few days  no fall.    vs on adm sbp 180 now better other vs stable    pt is not coperatve  physical done , pt just wakes up and goes to sleep   rt lower extremity , non tender  xray, venous doppler negative.    seen by pain managment

## 2018-11-30 NOTE — CHART NOTE - NSCHARTNOTEFT_GEN_A_CORE
11/30/18, 12:30am, Pt. c/o severe right LE pain level 10/10. Evaluated at bedside. Requested more strong medication than Tylenol and Dilaudid 1mg. Next dose of Dilaudid 2mg po - at 2:30am. Dilaudid 2 mg po x1 stat dose given. BP - 158/92, HR - 85.

## 2018-11-30 NOTE — CONSULT NOTE ADULT - PROBLEM SELECTOR RECOMMENDATION 9
- Dilaudid 1 mg q 6 hrs for severe pain (will switch to po tomorrow, pt aware   - oxycodone 5 mg q 6 hrs prn for moderate pain   - tylenol 650 mg q 6 hrs for mild pain prn   - gabapentin 400 mg q 12hrs   - antibiotic as per medicine

## 2018-12-01 LAB
ALBUMIN SERPL ELPH-MCNC: 2.5 G/DL — LOW (ref 3.5–5)
ALP SERPL-CCNC: 92 U/L — SIGNIFICANT CHANGE UP (ref 40–120)
ALT FLD-CCNC: 18 U/L DA — SIGNIFICANT CHANGE UP (ref 10–60)
ANION GAP SERPL CALC-SCNC: 14 MMOL/L — SIGNIFICANT CHANGE UP (ref 5–17)
AST SERPL-CCNC: 12 U/L — SIGNIFICANT CHANGE UP (ref 10–40)
BILIRUB SERPL-MCNC: 0.3 MG/DL — SIGNIFICANT CHANGE UP (ref 0.2–1.2)
BUN SERPL-MCNC: 66 MG/DL — HIGH (ref 7–18)
CALCIUM SERPL-MCNC: 9.6 MG/DL — SIGNIFICANT CHANGE UP (ref 8.4–10.5)
CHLORIDE SERPL-SCNC: 101 MMOL/L — SIGNIFICANT CHANGE UP (ref 96–108)
CO2 SERPL-SCNC: 22 MMOL/L — SIGNIFICANT CHANGE UP (ref 22–31)
CREAT SERPL-MCNC: 10.6 MG/DL — HIGH (ref 0.5–1.3)
GLUCOSE BLDC GLUCOMTR-MCNC: 126 MG/DL — HIGH (ref 70–99)
GLUCOSE BLDC GLUCOMTR-MCNC: 133 MG/DL — HIGH (ref 70–99)
GLUCOSE BLDC GLUCOMTR-MCNC: 145 MG/DL — HIGH (ref 70–99)
GLUCOSE BLDC GLUCOMTR-MCNC: 154 MG/DL — HIGH (ref 70–99)
GLUCOSE BLDC GLUCOMTR-MCNC: 168 MG/DL — HIGH (ref 70–99)
GLUCOSE SERPL-MCNC: 133 MG/DL — HIGH (ref 70–99)
HCT VFR BLD CALC: 28.7 % — LOW (ref 39–50)
HGB BLD-MCNC: 9.4 G/DL — LOW (ref 13–17)
MCHC RBC-ENTMCNC: 30.2 PG — SIGNIFICANT CHANGE UP (ref 27–34)
MCHC RBC-ENTMCNC: 32.8 GM/DL — SIGNIFICANT CHANGE UP (ref 32–36)
MCV RBC AUTO: 92 FL — SIGNIFICANT CHANGE UP (ref 80–100)
PLATELET # BLD AUTO: 326 K/UL — SIGNIFICANT CHANGE UP (ref 150–400)
POTASSIUM SERPL-MCNC: 4 MMOL/L — SIGNIFICANT CHANGE UP (ref 3.5–5.3)
POTASSIUM SERPL-SCNC: 4 MMOL/L — SIGNIFICANT CHANGE UP (ref 3.5–5.3)
PROT SERPL-MCNC: 7.5 G/DL — SIGNIFICANT CHANGE UP (ref 6–8.3)
RBC # BLD: 3.12 M/UL — LOW (ref 4.2–5.8)
RBC # FLD: 14 % — SIGNIFICANT CHANGE UP (ref 10.3–14.5)
SODIUM SERPL-SCNC: 137 MMOL/L — SIGNIFICANT CHANGE UP (ref 135–145)
WBC # BLD: 8.9 K/UL — SIGNIFICANT CHANGE UP (ref 3.8–10.5)
WBC # FLD AUTO: 8.9 K/UL — SIGNIFICANT CHANGE UP (ref 3.8–10.5)

## 2018-12-01 RX ADMIN — OXYCODONE HYDROCHLORIDE 5 MILLIGRAM(S): 5 TABLET ORAL at 19:59

## 2018-12-01 RX ADMIN — Medication 100 MILLIGRAM(S): at 17:27

## 2018-12-01 RX ADMIN — SEVELAMER CARBONATE 1600 MILLIGRAM(S): 2400 POWDER, FOR SUSPENSION ORAL at 21:44

## 2018-12-01 RX ADMIN — HYDROMORPHONE HYDROCHLORIDE 1 MILLIGRAM(S): 2 INJECTION INTRAMUSCULAR; INTRAVENOUS; SUBCUTANEOUS at 00:00

## 2018-12-01 RX ADMIN — OXYCODONE HYDROCHLORIDE 5 MILLIGRAM(S): 5 TABLET ORAL at 21:00

## 2018-12-01 RX ADMIN — SEVELAMER CARBONATE 1600 MILLIGRAM(S): 2400 POWDER, FOR SUSPENSION ORAL at 17:29

## 2018-12-01 RX ADMIN — OXYCODONE HYDROCHLORIDE 5 MILLIGRAM(S): 5 TABLET ORAL at 17:26

## 2018-12-01 RX ADMIN — PANTOPRAZOLE SODIUM 40 MILLIGRAM(S): 20 TABLET, DELAYED RELEASE ORAL at 06:12

## 2018-12-01 RX ADMIN — INSULIN GLARGINE 15 UNIT(S): 100 INJECTION, SOLUTION SUBCUTANEOUS at 22:36

## 2018-12-01 RX ADMIN — GABAPENTIN 400 MILLIGRAM(S): 400 CAPSULE ORAL at 06:12

## 2018-12-01 RX ADMIN — SEVELAMER CARBONATE 1600 MILLIGRAM(S): 2400 POWDER, FOR SUSPENSION ORAL at 06:12

## 2018-12-01 RX ADMIN — CINACALCET 30 MILLIGRAM(S): 30 TABLET, FILM COATED ORAL at 11:55

## 2018-12-01 RX ADMIN — Medication 5 UNIT(S): at 08:38

## 2018-12-01 RX ADMIN — HYDROMORPHONE HYDROCHLORIDE 1 MILLIGRAM(S): 2 INJECTION INTRAMUSCULAR; INTRAVENOUS; SUBCUTANEOUS at 17:43

## 2018-12-01 RX ADMIN — OXYCODONE HYDROCHLORIDE 5 MILLIGRAM(S): 5 TABLET ORAL at 12:21

## 2018-12-01 RX ADMIN — HYDROMORPHONE HYDROCHLORIDE 1 MILLIGRAM(S): 2 INJECTION INTRAMUSCULAR; INTRAVENOUS; SUBCUTANEOUS at 03:52

## 2018-12-01 RX ADMIN — Medication 5 UNIT(S): at 17:43

## 2018-12-01 RX ADMIN — MIRTAZAPINE 45 MILLIGRAM(S): 45 TABLET, ORALLY DISINTEGRATING ORAL at 11:55

## 2018-12-01 RX ADMIN — HYDROMORPHONE HYDROCHLORIDE 1 MILLIGRAM(S): 2 INJECTION INTRAMUSCULAR; INTRAVENOUS; SUBCUTANEOUS at 18:51

## 2018-12-01 RX ADMIN — Medication 100 MILLIGRAM(S): at 06:12

## 2018-12-01 RX ADMIN — GABAPENTIN 400 MILLIGRAM(S): 400 CAPSULE ORAL at 17:29

## 2018-12-01 RX ADMIN — Medication 5 UNIT(S): at 11:54

## 2018-12-01 RX ADMIN — QUETIAPINE FUMARATE 200 MILLIGRAM(S): 200 TABLET, FILM COATED ORAL at 11:55

## 2018-12-01 RX ADMIN — Medication 1: at 17:43

## 2018-12-01 RX ADMIN — ESCITALOPRAM OXALATE 10 MILLIGRAM(S): 10 TABLET, FILM COATED ORAL at 11:55

## 2018-12-01 RX ADMIN — SIMVASTATIN 40 MILLIGRAM(S): 20 TABLET, FILM COATED ORAL at 21:44

## 2018-12-01 RX ADMIN — Medication 100 MILLIGRAM(S): at 21:44

## 2018-12-01 NOTE — PROGRESS NOTE ADULT - ASSESSMENT
seen and examined  vsstable  today alert awake  as per pt , he was not feeling sec to rt leg pain from 1 week  had banged to object  in middle of shin has small wound  today he is feeling great.  no cough, no abd pain  vs stable physcal unchanged   rt leg warm tender, no fluctuation  xray to r/o gas gangerene   ID and wound care

## 2018-12-01 NOTE — PHYSICAL THERAPY INITIAL EVALUATION ADULT - CRITERIA FOR SKILLED THERAPEUTIC INTERVENTIONS
Pt. presents without gross impairment and is independent with functional mobility. Skilled therapeutic PT intervention not indicated at this time. Pt. will not be placed on PT program.

## 2018-12-01 NOTE — PHYSICAL THERAPY INITIAL EVALUATION ADULT - PASSIVE RANGE OF MOTION EXAMINATION, REHAB EVAL
bilateral upper extremity Passive ROM was WFL (within functional limits)/bilateral lower extremity Passive ROM was WNL

## 2018-12-01 NOTE — PROGRESS NOTE ADULT - SUBJECTIVE AND OBJECTIVE BOX
still C/O SOB.   s/p HD yesterday.    aspirin (Swelling)  penicillins (Swelling)  shellfish (Unknown)    Hospital Medications:   MEDICATIONS  (STANDING):  cinacalcet 30 milliGRAM(s) Oral daily  clindamycin IVPB 600 milliGRAM(s) IV Intermittent every 8 hours  escitalopram 10 milliGRAM(s) Oral daily  gabapentin 400 milliGRAM(s) Oral two times a day  influenza   Vaccine 0.5 milliLiter(s) IntraMuscular once  insulin glargine Injectable (LANTUS) 15 Unit(s) SubCutaneous at bedtime  insulin lispro (HumaLOG) corrective regimen sliding scale   SubCutaneous Before meals and at bedtime  insulin lispro Injectable (HumaLOG) 5 Unit(s) SubCutaneous three times a day before meals  mirtazapine 45 milliGRAM(s) Oral daily  pantoprazole    Tablet 40 milliGRAM(s) Oral before breakfast  QUEtiapine 200 milliGRAM(s) Oral daily  sevelamer hydrochloride 1600 milliGRAM(s) Oral every 8 hours  simvastatin 40 milliGRAM(s) Oral at bedtime        VITALS:  T(F): 98.8 (12-01-18 @ 05:40), Max: 98.8 (12-01-18 @ 05:40)  HR: 95 (12-01-18 @ 10:53)  BP: 154/99 (12-01-18 @ 10:53)  RR: 18 (12-01-18 @ 05:40)  SpO2: 100% (12-01-18 @ 10:53)  Wt(kg): --      PHYSICAL EXAM:  Constitutional: NAD  HEENT: anicteric sclera, oropharynx clear.  Neck: No JVD  Respiratory: CTAB, no wheezes, rales or rhonchi  Cardiovascular: S1, S2, RRR  Gastrointestinal: BS+, soft, NT/ND  Extremities:  peripheral edema+. RLE edema  Neurological: A/O x 3, no focal deficits  Psychiatric: Normal mood, normal affect  : No CVA tenderness. No hernandez.   Skin: No rashes  Vascular Access: AVF with thrill and bruit    LABS:  11-30    135  |  102  |  98<H>  ----------------------------<  114<H>  5.3   |  19<L>  |  13.20<H>    Ca    11.1<H>      30 Nov 2018 06:21  Phos  10.0     11-30  Mg     2.5     11-30    TPro  7.8  /  Alb  2.6<L>  /  TBili  0.3  /  DBili      /  AST  8<L>  /  ALT  17  /  AlkPhos  95  11-30    Creatinine Trend: 13.20 <--, 13.20 <--                        9.9    12.0  )-----------( 319      ( 30 Nov 2018 06:21 )             30.4     Urine Studies:      RADIOLOGY & ADDITIONAL STUDIES:

## 2018-12-01 NOTE — PROGRESS NOTE ADULT - SUBJECTIVE AND OBJECTIVE BOX
HPI:  Patient is a 39 y/o M from Home with History of ESRD on HD TTS via left arm AV fistula/graft x 2 years, HTN, HLD, DM, chronic back pain presented with complaint of right leg swelling and redness. Patient was admitted to [facility] and discharged on friday for missing dialysis. He then had a scrap on his leg and then developed redness and swelling at the site. Patient also complaints of chills , nausea , NBNP vomiting. No fever, no chest pain , no sob , no dizziness , no headache.     In Ed , BP: 186/110 mm hg , HR: 71 , Temp: 98.7 F  Cbc shows elevated white count with left shift  Bmp with elevated BUN/ Creatinine   Lactate WNL. (29 Nov 2018 18:21)      Patient is a 38y old  Male who presents with a chief complaint of right leg swelling , redness (01 Dec 2018 11:26)      INTERVAL HPI/OVERNIGHT EVENTS:  T(C): 36.7 (12-01-18 @ 16:30), Max: 37.1 (12-01-18 @ 05:40)  HR: 81 (12-01-18 @ 16:30) (81 - 95)  BP: 145/79 (12-01-18 @ 16:30) (140/72 - 154/99)  RR: 17 (12-01-18 @ 16:30) (17 - 18)  SpO2: 98% (12-01-18 @ 16:30) (95% - 100%)  Wt(kg): --  I&O's Summary      REVIEW OF SYSTEMS: denies fever, chills, SOB, palpitations, chest pain, abdominal pain, nausea, vomitting, diarrhea, constipation, dizziness    MEDICATIONS  (STANDING):  cinacalcet 30 milliGRAM(s) Oral daily  clindamycin IVPB 600 milliGRAM(s) IV Intermittent every 8 hours  escitalopram 10 milliGRAM(s) Oral daily  gabapentin 400 milliGRAM(s) Oral two times a day  influenza   Vaccine 0.5 milliLiter(s) IntraMuscular once  insulin glargine Injectable (LANTUS) 15 Unit(s) SubCutaneous at bedtime  insulin lispro (HumaLOG) corrective regimen sliding scale   SubCutaneous Before meals and at bedtime  insulin lispro Injectable (HumaLOG) 5 Unit(s) SubCutaneous three times a day before meals  mirtazapine 45 milliGRAM(s) Oral daily  pantoprazole    Tablet 40 milliGRAM(s) Oral before breakfast  QUEtiapine 200 milliGRAM(s) Oral daily  sevelamer hydrochloride 1600 milliGRAM(s) Oral every 8 hours  simvastatin 40 milliGRAM(s) Oral at bedtime    MEDICATIONS  (PRN):  acetaminophen   Tablet .. 650 milliGRAM(s) Oral every 6 hours PRN Temp greater or equal to 38C (100.4F), Mild Pain (1 - 3)  HYDROmorphone  Injectable 1 milliGRAM(s) IV Push every 6 hours PRN Severe Pain (7 - 10)  oxyCODONE    IR 5 milliGRAM(s) Oral every 6 hours PRN Moderate Pain (4 - 6)      PHYSICAL EXAM:  GENERAL: NAD, well-groomed, well-developed  HEAD:  Atraumatic, Normocephalic  EYES: EOMI, PERRLA, conjunctiva and sclera clear  ENMT: No tonsillar erythema, exudates, or enlargement; Moist mucous membranes, Good dentition, No lesions  NECK: Supple, No JVD, Normal thyroid  NERVOUS SYSTEM:  Alert & Oriented X3, Good concentration; Motor Strength 5/5 B/L upper and lower extremities; DTRs 2+ intact and symmetric  CHEST/LUNG: Clear to percussion bilaterally; No rales, rhonchi, wheezing, or rubs  HEART: Regular rate and rhythm; No murmurs, rubs, or gallops  ABDOMEN: Soft, Nontender, Nondistended; Bowel sounds present  EXTREMITIES:  2+ Peripheral Pulses, No clubbing, cyanosis, or edema  LYMPH: No lymphadenopathy noted  SKIN: No rashes or lesions  LABS:                        9.4    8.9   )-----------( 326      ( 01 Dec 2018 13:44 )             28.7     12-01    137  |  101  |  66<H>  ----------------------------<  133<H>  4.0   |  22  |  10.60<H>    Ca    9.6      01 Dec 2018 13:44  Phos  10.0     11-30  Mg     2.5     11-30    TPro  7.5  /  Alb  2.5<L>  /  TBili  0.3  /  DBili  x   /  AST  12  /  ALT  18  /  AlkPhos  92  12-01        CAPILLARY BLOOD GLUCOSE      POCT Blood Glucose.: 168 mg/dL (01 Dec 2018 17:41)  POCT Blood Glucose.: 154 mg/dL (01 Dec 2018 16:15)  POCT Blood Glucose.: 133 mg/dL (01 Dec 2018 11:41)  POCT Blood Glucose.: 145 mg/dL (01 Dec 2018 08:14)  POCT Blood Glucose.: 143 mg/dL (30 Nov 2018 21:16)

## 2018-12-01 NOTE — PROGRESS NOTE ADULT - ASSESSMENT
38 yr old male on with ESRD on HD TTS at [facility]. Very non-compliant with dialysis schedule and misses dialysis very frequently.  Was recently admitted in the in [place] after missing dialysis for a few days. Last dialysis was Wednesday last week. 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 Started on antibiotics and renal consulted as pt on hemodialysis.    # ESRD pt sen on HD- s/p HD yesterday. still volume overloaded. HD again today.   # HTN uncontrolled. UF as tolerated today. Please start toprol 25 mg   #RLE cellulitis. cont clindamycin  #CKDMBD cont sensipar, renvela  # anemia of CKD cont procrit on HD

## 2018-12-02 LAB
AMPHET UR-MCNC: NEGATIVE — SIGNIFICANT CHANGE UP
ANION GAP SERPL CALC-SCNC: 12 MMOL/L — SIGNIFICANT CHANGE UP (ref 5–17)
BARBITURATES UR SCN-MCNC: NEGATIVE — SIGNIFICANT CHANGE UP
BENZODIAZ UR-MCNC: NEGATIVE — SIGNIFICANT CHANGE UP
BUN SERPL-MCNC: 47 MG/DL — HIGH (ref 7–18)
CALCIUM SERPL-MCNC: 9.9 MG/DL — SIGNIFICANT CHANGE UP (ref 8.4–10.5)
CHLORIDE SERPL-SCNC: 98 MMOL/L — SIGNIFICANT CHANGE UP (ref 96–108)
CO2 SERPL-SCNC: 23 MMOL/L — SIGNIFICANT CHANGE UP (ref 22–31)
COCAINE METAB.OTHER UR-MCNC: NEGATIVE — SIGNIFICANT CHANGE UP
CREAT SERPL-MCNC: 8.24 MG/DL — HIGH (ref 0.5–1.3)
GLUCOSE BLDC GLUCOMTR-MCNC: 102 MG/DL — HIGH (ref 70–99)
GLUCOSE BLDC GLUCOMTR-MCNC: 109 MG/DL — HIGH (ref 70–99)
GLUCOSE BLDC GLUCOMTR-MCNC: 120 MG/DL — HIGH (ref 70–99)
GLUCOSE BLDC GLUCOMTR-MCNC: 136 MG/DL — HIGH (ref 70–99)
GLUCOSE SERPL-MCNC: 109 MG/DL — HIGH (ref 70–99)
HCT VFR BLD CALC: 31.5 % — LOW (ref 39–50)
HGB BLD-MCNC: 10.3 G/DL — LOW (ref 13–17)
MCHC RBC-ENTMCNC: 30.5 PG — SIGNIFICANT CHANGE UP (ref 27–34)
MCHC RBC-ENTMCNC: 32.8 GM/DL — SIGNIFICANT CHANGE UP (ref 32–36)
MCV RBC AUTO: 93.2 FL — SIGNIFICANT CHANGE UP (ref 80–100)
METHADONE UR-MCNC: NEGATIVE — SIGNIFICANT CHANGE UP
OPIATES UR-MCNC: POSITIVE
PCP SPEC-MCNC: SIGNIFICANT CHANGE UP
PCP UR-MCNC: NEGATIVE — SIGNIFICANT CHANGE UP
PLATELET # BLD AUTO: 321 K/UL — SIGNIFICANT CHANGE UP (ref 150–400)
POTASSIUM SERPL-MCNC: 3.7 MMOL/L — SIGNIFICANT CHANGE UP (ref 3.5–5.3)
POTASSIUM SERPL-SCNC: 3.7 MMOL/L — SIGNIFICANT CHANGE UP (ref 3.5–5.3)
RBC # BLD: 3.38 M/UL — LOW (ref 4.2–5.8)
RBC # FLD: 13.7 % — SIGNIFICANT CHANGE UP (ref 10.3–14.5)
SODIUM SERPL-SCNC: 133 MMOL/L — LOW (ref 135–145)
THC UR QL: NEGATIVE — SIGNIFICANT CHANGE UP
WBC # BLD: 8.4 K/UL — SIGNIFICANT CHANGE UP (ref 3.8–10.5)
WBC # FLD AUTO: 8.4 K/UL — SIGNIFICANT CHANGE UP (ref 3.8–10.5)

## 2018-12-02 RX ORDER — HEPARIN SODIUM 5000 [USP'U]/ML
5000 INJECTION INTRAVENOUS; SUBCUTANEOUS EVERY 8 HOURS
Qty: 0 | Refills: 0 | Status: DISCONTINUED | OUTPATIENT
Start: 2018-12-02 | End: 2018-12-06

## 2018-12-02 RX ADMIN — PANTOPRAZOLE SODIUM 40 MILLIGRAM(S): 20 TABLET, DELAYED RELEASE ORAL at 06:35

## 2018-12-02 RX ADMIN — Medication 5 UNIT(S): at 08:34

## 2018-12-02 RX ADMIN — Medication 5 UNIT(S): at 12:12

## 2018-12-02 RX ADMIN — Medication 5 UNIT(S): at 17:21

## 2018-12-02 RX ADMIN — HYDROMORPHONE HYDROCHLORIDE 1 MILLIGRAM(S): 2 INJECTION INTRAMUSCULAR; INTRAVENOUS; SUBCUTANEOUS at 08:43

## 2018-12-02 RX ADMIN — HEPARIN SODIUM 5000 UNIT(S): 5000 INJECTION INTRAVENOUS; SUBCUTANEOUS at 21:16

## 2018-12-02 RX ADMIN — Medication 100 MILLIGRAM(S): at 06:35

## 2018-12-02 RX ADMIN — SEVELAMER CARBONATE 1600 MILLIGRAM(S): 2400 POWDER, FOR SUSPENSION ORAL at 14:26

## 2018-12-02 RX ADMIN — HYDROMORPHONE HYDROCHLORIDE 1 MILLIGRAM(S): 2 INJECTION INTRAMUSCULAR; INTRAVENOUS; SUBCUTANEOUS at 02:42

## 2018-12-02 RX ADMIN — SEVELAMER CARBONATE 1600 MILLIGRAM(S): 2400 POWDER, FOR SUSPENSION ORAL at 21:16

## 2018-12-02 RX ADMIN — HYDROMORPHONE HYDROCHLORIDE 1 MILLIGRAM(S): 2 INJECTION INTRAMUSCULAR; INTRAVENOUS; SUBCUTANEOUS at 03:30

## 2018-12-02 RX ADMIN — HYDROMORPHONE HYDROCHLORIDE 1 MILLIGRAM(S): 2 INJECTION INTRAMUSCULAR; INTRAVENOUS; SUBCUTANEOUS at 14:26

## 2018-12-02 RX ADMIN — SIMVASTATIN 40 MILLIGRAM(S): 20 TABLET, FILM COATED ORAL at 21:16

## 2018-12-02 RX ADMIN — HYDROMORPHONE HYDROCHLORIDE 1 MILLIGRAM(S): 2 INJECTION INTRAMUSCULAR; INTRAVENOUS; SUBCUTANEOUS at 16:19

## 2018-12-02 RX ADMIN — Medication 100 MILLIGRAM(S): at 21:15

## 2018-12-02 RX ADMIN — HYDROMORPHONE HYDROCHLORIDE 1 MILLIGRAM(S): 2 INJECTION INTRAMUSCULAR; INTRAVENOUS; SUBCUTANEOUS at 11:01

## 2018-12-02 RX ADMIN — OXYCODONE HYDROCHLORIDE 5 MILLIGRAM(S): 5 TABLET ORAL at 22:15

## 2018-12-02 RX ADMIN — OXYCODONE HYDROCHLORIDE 5 MILLIGRAM(S): 5 TABLET ORAL at 21:15

## 2018-12-02 RX ADMIN — GABAPENTIN 400 MILLIGRAM(S): 400 CAPSULE ORAL at 17:21

## 2018-12-02 RX ADMIN — Medication 100 MILLIGRAM(S): at 14:26

## 2018-12-02 RX ADMIN — GABAPENTIN 400 MILLIGRAM(S): 400 CAPSULE ORAL at 06:35

## 2018-12-02 RX ADMIN — SEVELAMER CARBONATE 1600 MILLIGRAM(S): 2400 POWDER, FOR SUSPENSION ORAL at 06:34

## 2018-12-02 RX ADMIN — QUETIAPINE FUMARATE 200 MILLIGRAM(S): 200 TABLET, FILM COATED ORAL at 12:15

## 2018-12-02 RX ADMIN — INSULIN GLARGINE 15 UNIT(S): 100 INJECTION, SOLUTION SUBCUTANEOUS at 22:57

## 2018-12-02 RX ADMIN — ESCITALOPRAM OXALATE 10 MILLIGRAM(S): 10 TABLET, FILM COATED ORAL at 12:13

## 2018-12-02 RX ADMIN — MIRTAZAPINE 45 MILLIGRAM(S): 45 TABLET, ORALLY DISINTEGRATING ORAL at 12:13

## 2018-12-02 RX ADMIN — CINACALCET 30 MILLIGRAM(S): 30 TABLET, FILM COATED ORAL at 12:12

## 2018-12-02 NOTE — PROGRESS NOTE ADULT - ASSESSMENT
seen and examined  vs stable afebrile  physical unchanged  pt is alert awake  no complaints    physical done   rt leg  tenderness better  redness also better  cont IV antibx  yesterday  spoke to ID    awaiting for MRI  esrd on hd  oob in chair

## 2018-12-02 NOTE — CONSULT NOTE ADULT - SUBJECTIVE AND OBJECTIVE BOX
HPI:  Patient is a 39 y/o M from Home with History of ESRD on HD TTS via left arm AV fistula/graft x 2 years, HTN, HLD, DM, chronic back pain presented with complaint of right leg swelling and redness. Patient was admitted to [facility] and discharged on friday for missing dialysis. He then had a scrap on his leg and then developed redness and swelling at the site. Patient also complaints of chills , nausea , NBNP vomiting. No fever, no chest pain , no sob , no dizziness , no headache.         PAST MEDICAL & SURGICAL HISTORY:  ESRD on dialysis  Morbid obesity with BMI of 40.0-44.9, adult  Diabetes  H/O hernia repair      aspirin (Swelling)  penicillins (Swelling)  shellfish (Unknown)      Meds:  acetaminophen   Tablet .. 650 milliGRAM(s) Oral every 6 hours PRN  cinacalcet 30 milliGRAM(s) Oral daily  clindamycin IVPB 600 milliGRAM(s) IV Intermittent every 8 hours  escitalopram 10 milliGRAM(s) Oral daily  gabapentin 400 milliGRAM(s) Oral two times a day  heparin  Injectable 5000 Unit(s) SubCutaneous every 8 hours  HYDROmorphone  Injectable 1 milliGRAM(s) IV Push every 6 hours PRN  influenza   Vaccine 0.5 milliLiter(s) IntraMuscular once  insulin glargine Injectable (LANTUS) 15 Unit(s) SubCutaneous at bedtime  insulin lispro (HumaLOG) corrective regimen sliding scale   SubCutaneous Before meals and at bedtime  insulin lispro Injectable (HumaLOG) 5 Unit(s) SubCutaneous three times a day before meals  mirtazapine 45 milliGRAM(s) Oral daily  oxyCODONE    IR 5 milliGRAM(s) Oral every 6 hours PRN  pantoprazole    Tablet 40 milliGRAM(s) Oral before breakfast  QUEtiapine 200 milliGRAM(s) Oral daily  sevelamer hydrochloride 1600 milliGRAM(s) Oral every 8 hours  simvastatin 40 milliGRAM(s) Oral at bedtime      SOCIAL HISTORY:  Smoker:    ETOH use:      FAMILY HISTORY:  Family history of diabetes mellitus (Mother)      VITALS:  Vital Signs Last 24 Hrs  T(C): 36.7 (02 Dec 2018 05:20), Max: 36.7 (01 Dec 2018 16:30)  T(F): 98.1 (02 Dec 2018 05:20), Max: 98.1 (02 Dec 2018 05:20)  HR: 31 (02 Dec 2018 05:20) (31 - 81)  BP: 166/59 (02 Dec 2018 05:20) (145/79 - 166/90)  BP(mean): --  RR: 18 (02 Dec 2018 05:20) (17 - 18)  SpO2: 100% (02 Dec 2018 05:20) (96% - 100%)    LABS/DIAGNOSTIC TESTS:                          10.3   8.4   )-----------( 321      ( 02 Dec 2018 08:18 )             31.5     WBC Count: 8.4 K/uL (12-02 @ 08:18)  WBC Count: 8.9 K/uL (12-01 @ 13:44)  WBC Count: 12.0 K/uL (11-30 @ 06:21)      12-02    133<L>  |  98  |  47<H>  ----------------------------<  109<H>  3.7   |  23  |  8.24<H>    Ca    9.9      02 Dec 2018 08:18    TPro  7.5  /  Alb  2.5<L>  /  TBili  0.3  /  DBili  x   /  AST  12  /  ALT  18  /  AlkPhos  92  12-01          LIVER FUNCTIONS - ( 01 Dec 2018 13:44 )  Alb: 2.5 g/dL / Pro: 7.5 g/dL / ALK PHOS: 92 U/L / ALT: 18 U/L DA / AST: 12 U/L / GGT: x                 LACTATE:    ABG -     CULTURES:   .Blood Blood  11-30 @ 23:45   No growth to date.  --  --      .Blood Blood  11-29 @ 18:29   No growth to date.  --  --          RADIOLOGY:< from: Xray Tibia + Fibula 2 Views, Right (11.29.18 @ 20:13) >  EXAM:  LEG AP&LAT - RIGHT                            PROCEDURE DATE:  11/29/2018          INTERPRETATION:  CLINICAL INDICATION:  Right lower extremity cellulitis;   evaluate for osteomyelitis.    COMPARISON:  None    TECHNIQUE:  AP and lateral radiographs of the right lower leg performed.    FINDINGS:  There is no evidence for acute or chronic fracture deformity   of the right tibia or fibula. No regions of osteolysis or osteosclerosis   noted. Knee and ankle articulations appear intact. Soft tissue   calcifications are evident. No retained metallic foreign bodies are noted.    IMPRESSION:  As above.      < end of copied text >        ROS  [  ] UNABLE TO ELICIT HPI:  Patient is a 37 y/o M from Home with History of ESRD on HD TTS via left arm AV fistula/graft x 2 years, HTN, HLD, DM, chronic back pain presented with complaint of right leg swelling and redness. Patient was admitted to [facility] and discharged on friday for missing dialysis. He then had a scrap on his leg and then developed redness and swelling at the site. Patient also complaints of chills , nausea , NBNP vomiting. No fever, no chest pain , no sob , no dizziness , no headache.   Pt c/o less pain over his right leg and that the redness and swelling have improved since coming here. He has no fevers or chills.        PAST MEDICAL & SURGICAL HISTORY:  ESRD on dialysis  Morbid obesity with BMI of 40.0-44.9, adult  Diabetes  H/O hernia repair      aspirin (Swelling)  penicillins (Swelling)  shellfish (Unknown)      Meds:  acetaminophen   Tablet .. 650 milliGRAM(s) Oral every 6 hours PRN  cinacalcet 30 milliGRAM(s) Oral daily  clindamycin IVPB 600 milliGRAM(s) IV Intermittent every 8 hours  escitalopram 10 milliGRAM(s) Oral daily  gabapentin 400 milliGRAM(s) Oral two times a day  heparin  Injectable 5000 Unit(s) SubCutaneous every 8 hours  HYDROmorphone  Injectable 1 milliGRAM(s) IV Push every 6 hours PRN  influenza   Vaccine 0.5 milliLiter(s) IntraMuscular once  insulin glargine Injectable (LANTUS) 15 Unit(s) SubCutaneous at bedtime  insulin lispro (HumaLOG) corrective regimen sliding scale   SubCutaneous Before meals and at bedtime  insulin lispro Injectable (HumaLOG) 5 Unit(s) SubCutaneous three times a day before meals  mirtazapine 45 milliGRAM(s) Oral daily  oxyCODONE    IR 5 milliGRAM(s) Oral every 6 hours PRN  pantoprazole    Tablet 40 milliGRAM(s) Oral before breakfast  QUEtiapine 200 milliGRAM(s) Oral daily  sevelamer hydrochloride 1600 milliGRAM(s) Oral every 8 hours  simvastatin 40 milliGRAM(s) Oral at bedtime      SOCIAL HISTORY:  Smoker: smoker   ETOH use: denies    FAMILY HISTORY:  Family history of diabetes mellitus (Mother)      VITALS:  Vital Signs Last 24 Hrs  T(C): 36.7 (02 Dec 2018 05:20), Max: 36.7 (01 Dec 2018 16:30)  T(F): 98.1 (02 Dec 2018 05:20), Max: 98.1 (02 Dec 2018 05:20)  HR: 31 (02 Dec 2018 05:20) (31 - 81)  BP: 166/59 (02 Dec 2018 05:20) (145/79 - 166/90)  BP(mean): --  RR: 18 (02 Dec 2018 05:20) (17 - 18)  SpO2: 100% (02 Dec 2018 05:20) (96% - 100%)    LABS/DIAGNOSTIC TESTS:                          10.3   8.4   )-----------( 321      ( 02 Dec 2018 08:18 )             31.5     WBC Count: 8.4 K/uL (12-02 @ 08:18)  WBC Count: 8.9 K/uL (12-01 @ 13:44)  WBC Count: 12.0 K/uL (11-30 @ 06:21)      12-02    133<L>  |  98  |  47<H>  ----------------------------<  109<H>  3.7   |  23  |  8.24<H>    Ca    9.9      02 Dec 2018 08:18    TPro  7.5  /  Alb  2.5<L>  /  TBili  0.3  /  DBili  x   /  AST  12  /  ALT  18  /  AlkPhos  92  12-01          LIVER FUNCTIONS - ( 01 Dec 2018 13:44 )  Alb: 2.5 g/dL / Pro: 7.5 g/dL / ALK PHOS: 92 U/L / ALT: 18 U/L DA / AST: 12 U/L / GGT: x                 LACTATE:    ABG -     CULTURES:   .Blood Blood  11-30 @ 23:45   No growth to date.  --  --      .Blood Blood  11-29 @ 18:29   No growth to date.  --  --          RADIOLOGY:< from: Xray Tibia + Fibula 2 Views, Right (11.29.18 @ 20:13) >  EXAM:  LEG AP&LAT - RIGHT                            PROCEDURE DATE:  11/29/2018          INTERPRETATION:  CLINICAL INDICATION:  Right lower extremity cellulitis;   evaluate for osteomyelitis.    COMPARISON:  None    TECHNIQUE:  AP and lateral radiographs of the right lower leg performed.    FINDINGS:  There is no evidence for acute or chronic fracture deformity   of the right tibia or fibula. No regions of osteolysis or osteosclerosis   noted. Knee and ankle articulations appear intact. Soft tissue   calcifications are evident. No retained metallic foreign bodies are noted.    IMPRESSION:  As above.      < end of copied text >        ROS  [  ] UNABLE TO ELICIT

## 2018-12-02 NOTE — PROGRESS NOTE ADULT - SUBJECTIVE AND OBJECTIVE BOX
Patient is a 38y old  Male who presents with a chief complaint of right leg swelling , redness (29 Nov 2018 18:21)      HPI:  Patient is a 37 y/o M from Home with History of ESRD on HD TTS via left arm AV fistula/graft x 2 years, HTN, HLD, DM, chronic back pain presented with complaint of right leg swelling and redness. Patient was admitted to [facility] and discharged on friday for missing dialysis. He then had a scrap on his leg and then developed redness and swelling at the site. Patient also complaints of chills , nausea , NBNP vomiting. No fever, no chest pain , no sob , no dizziness , no headache.     In Ed , BP: 186/110 mm hg , HR: 71 , Temp: 98.7 F  Cbc shows elevated white count with left shift  Bmp with elevated BUN/ Creatinine   Lactate WNL. (29 Nov 2018 18:21)    Pt seen bedside. Pt states his R leg has been red and swollen for a few days. Ptstates the R leg is painful as well and the redness has gotten worse    PMH: ESRD on dialysis  Morbid obesity with BMI of 40.0-44.9, adult  Diabetes    Allergies: aspirin (Swelling)  penicillins (Swelling)  shellfish (Unknown)    Medications: acetaminophen   Tablet .. 650 milliGRAM(s) Oral every 6 hours PRN  cinacalcet 30 milliGRAM(s) Oral daily  clindamycin IVPB 600 milliGRAM(s) IV Intermittent every 8 hours  escitalopram 10 milliGRAM(s) Oral daily  gabapentin 400 milliGRAM(s) Oral two times a day  HYDROmorphone   Tablet 2 milliGRAM(s) Oral every 6 hours PRN  influenza   Vaccine 0.5 milliLiter(s) IntraMuscular once  insulin glargine Injectable (LANTUS) 15 Unit(s) SubCutaneous at bedtime  insulin lispro (HumaLOG) corrective regimen sliding scale   SubCutaneous Before meals and at bedtime  insulin lispro Injectable (HumaLOG) 5 Unit(s) SubCutaneous three times a day before meals  mirtazapine 45 milliGRAM(s) Oral daily  pantoprazole    Tablet 40 milliGRAM(s) Oral before breakfast  QUEtiapine 200 milliGRAM(s) Oral daily  sevelamer hydrochloride 1600 milliGRAM(s) Oral every 8 hours  simvastatin 40 milliGRAM(s) Oral at bedtime    FH:Family history of diabetes mellitus (Mother)    PSX: H/O hernia repair    SH: acetaminophen   Tablet .. 650 milliGRAM(s) Oral every 6 hours PRN  cinacalcet 30 milliGRAM(s) Oral daily  clindamycin IVPB 600 milliGRAM(s) IV Intermittent every 8 hours  escitalopram 10 milliGRAM(s) Oral daily  gabapentin 400 milliGRAM(s) Oral two times a day  HYDROmorphone   Tablet 2 milliGRAM(s) Oral every 6 hours PRN  influenza   Vaccine 0.5 milliLiter(s) IntraMuscular once  insulin glargine Injectable (LANTUS) 15 Unit(s) SubCutaneous at bedtime  insulin lispro (HumaLOG) corrective regimen sliding scale   SubCutaneous Before meals and at bedtime  insulin lispro Injectable (HumaLOG) 5 Unit(s) SubCutaneous three times a day before meals  mirtazapine 45 milliGRAM(s) Oral daily  pantoprazole    Tablet 40 milliGRAM(s) Oral before breakfast  QUEtiapine 200 milliGRAM(s) Oral daily  sevelamer hydrochloride 1600 milliGRAM(s) Oral every 8 hours  simvastatin 40 milliGRAM(s) Oral at bedtime      ICU Vital Signs Last 24 Hrs  T(C): 36.7 (02 Dec 2018 05:20), Max: 36.8 (01 Dec 2018 13:30)  T(F): 98.1 (02 Dec 2018 05:20), Max: 98.3 (01 Dec 2018 13:30)  HR: 31 (02 Dec 2018 05:20) (31 - 90)  BP: 166/59 (02 Dec 2018 05:20) (145/79 - 166/90)  BP(mean): --  ABP: --  ABP(mean): --  RR: 18 (02 Dec 2018 05:20) (17 - 18)  SpO2: 100% (02 Dec 2018 05:20) (96% - 100%)      LABS                                   10.3   8.4   )-----------( 321      ( 02 Dec 2018 08:18 )             31.5     WBC Count: 8.4 K/uL (12-02 @ 08:18)  WBC Count: 8.9 K/uL (12-01 @ 13:44)  WBC Count: 12.0 K/uL (11-30 @ 06:21)  WBC Count: 13.4 K/uL (11-29 @ 14:15)    12-02    133<L>  |  98  |  47<H>  ----------------------------<  109<H>  3.7   |  23  |  8.24<H>    Ca    9.9      02 Dec 2018 08:18    TPro  7.5  /  Alb  2.5<L>  /  TBili  0.3  /  DBili  x   /  AST  12  /  ALT  18  /  AlkPhos  92  12-01      PHYSICAL EXAM  GEN: PRESTON JOHNSON JR is a pleasant well-nourished, well developed 38y Male in no acute distress, alert awake, and oriented to person, place and time.   LE Focused:    Vasc:  DP/PT palpable, CFT <3 sec, TG warm to cool, Edema to R leg  Derm: Erythema noted to R light, small superficial eschar to anterior R leg from an old cut  Neuro: Protective sensation intact      < from: Xray Tibia + Fibula 2 Views, Right (11.29.18 @ 20:13) >    EXAM:  LEG AP&LAT - RIGHT                            PROCEDURE DATE:  11/29/2018          INTERPRETATION:  CLINICAL INDICATION:  Right lower extremity cellulitis;   evaluate for osteomyelitis.    COMPARISON:  None    TECHNIQUE:  AP and lateral radiographs of the right lower leg performed.    FINDINGS:  There is no evidence for acute or chronic fracture deformity   of the right tibia or fibula. No regions of osteolysis or osteosclerosis   noted. Knee and ankle articulations appear intact. Soft tissue   calcifications are evident. No retained metallic foreign bodies are noted.    IMPRESSION:  As above.        LISA KAPLAN   This document has been electronically signed. Nov 29 2018  8:25PM              < end of copied text >    MRI Pending    A:  R leg cellulitis    P:  Patient evaluated, chart reviewed.   X-ray reviewed as above  MRI Pending  IV abx per ID   dressing  to Right anterior leg  Podiatry to follow while in house  Seen With Dr. Tse

## 2018-12-02 NOTE — CONSULT NOTE ADULT - REASON FOR ADMISSION
right leg swelling , redness

## 2018-12-02 NOTE — CONSULT NOTE ADULT - SKIN COMMENTS
redness of right leg (shin region), swelling ,warmth and tenderness of area with a small linear superficial srap in his skin

## 2018-12-02 NOTE — CONSULT NOTE ADULT - ASSESSMENT
38 yr old male on with ESRD on HD TTS at [facility]. Very non-compliant with dialysis schedule and misses dialysis very frequently.  Was recently admitted in the in [place] after missing dialysis for a few days. Last dialysis was Wednesday last week. 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 Started on antibiotics and renal consulted as pt on hemodialysis.    # ESRD pt sen on HD- stable on dialysis.. D/W pt compliance with dialysis  # HTN uncontrolled. UF as tolerated.  UF as tolerated  #RLE cellulitis. cont clindamycin  #CKDMBD cont sensipar, renvela  # anemia of CKD cont procrit on HD
right leg cellulitis - no signs of abscess    plan - cont clindamycin 600mgs iv q8hrs for now  dc planning for Tuesday or Wednesday on po abxs or vancomycin IV during dialysis  no need for MRI of his leg at this time.

## 2018-12-02 NOTE — PROGRESS NOTE ADULT - SUBJECTIVE AND OBJECTIVE BOX
HPI:  Patient is a 39 y/o M from Home with History of ESRD on HD TTS via left arm AV fistula/graft x 2 years, HTN, HLD, DM, chronic back pain presented with complaint of right leg swelling and redness. Patient was admitted to [facility] and discharged on friday for missing dialysis. He then had a scrap on his leg and then developed redness and swelling at the site. Patient also complaints of chills , nausea , NBNP vomiting. No fever, no chest pain , no sob , no dizziness , no headache.     In Ed , BP: 186/110 mm hg , HR: 71 , Temp: 98.7 F  Cbc shows elevated white count with left shift  Bmp with elevated BUN/ Creatinine   Lactate WNL. (29 Nov 2018 18:21)      Patient is a 38y old  Male who presents with a chief complaint of right leg swelling , redness (02 Dec 2018 10:52)      INTERVAL HPI/OVERNIGHT EVENTS:  T(C): 36.7 (12-02-18 @ 05:20), Max: 36.7 (12-01-18 @ 16:30)  HR: 31 (12-02-18 @ 05:20) (31 - 81)  BP: 166/59 (12-02-18 @ 05:20) (145/79 - 166/90)  RR: 18 (12-02-18 @ 05:20) (17 - 18)  SpO2: 100% (12-02-18 @ 05:20) (96% - 100%)  Wt(kg): --  I&O's Summary      REVIEW OF SYSTEMS: denies fever, chills, SOB, palpitations, chest pain, abdominal pain, nausea, vomitting, diarrhea, constipation, dizziness    MEDICATIONS  (STANDING):  cinacalcet 30 milliGRAM(s) Oral daily  clindamycin IVPB 600 milliGRAM(s) IV Intermittent every 8 hours  escitalopram 10 milliGRAM(s) Oral daily  gabapentin 400 milliGRAM(s) Oral two times a day  influenza   Vaccine 0.5 milliLiter(s) IntraMuscular once  insulin glargine Injectable (LANTUS) 15 Unit(s) SubCutaneous at bedtime  insulin lispro (HumaLOG) corrective regimen sliding scale   SubCutaneous Before meals and at bedtime  insulin lispro Injectable (HumaLOG) 5 Unit(s) SubCutaneous three times a day before meals  mirtazapine 45 milliGRAM(s) Oral daily  pantoprazole    Tablet 40 milliGRAM(s) Oral before breakfast  QUEtiapine 200 milliGRAM(s) Oral daily  sevelamer hydrochloride 1600 milliGRAM(s) Oral every 8 hours  simvastatin 40 milliGRAM(s) Oral at bedtime    MEDICATIONS  (PRN):  acetaminophen   Tablet .. 650 milliGRAM(s) Oral every 6 hours PRN Temp greater or equal to 38C (100.4F), Mild Pain (1 - 3)  HYDROmorphone  Injectable 1 milliGRAM(s) IV Push every 6 hours PRN Severe Pain (7 - 10)  oxyCODONE    IR 5 milliGRAM(s) Oral every 6 hours PRN Moderate Pain (4 - 6)      PHYSICAL EXAM:  GENERAL: NAD, well-groomed, well-developed  HEAD:  Atraumatic, Normocephalic  EYES: EOMI, PERRLA, conjunctiva and sclera clear  ENMT: No tonsillar erythema, exudates, or enlargement; Moist mucous membranes, Good dentition, No lesions  NECK: Supple, No JVD, Normal thyroid  NERVOUS SYSTEM:  Alert & Oriented X3, Good concentration; Motor Strength 5/5 B/L upper and lower extremities; DTRs 2+ intact and symmetric  CHEST/LUNG: Clear to percussion bilaterally; No rales, rhonchi, wheezing, or rubs  HEART: Regular rate and rhythm; No murmurs, rubs, or gallops  ABDOMEN: Soft, Nontender, Nondistended; Bowel sounds present  EXTREMITIES:  2+ Peripheral Pulses, No clubbing, cyanosis, or edema  LYMPH: No lymphadenopathy noted  SKIN: No rashes or lesions  LABS:                        10.3   8.4   )-----------( 321      ( 02 Dec 2018 08:18 )             31.5     12-02    133<L>  |  98  |  47<H>  ----------------------------<  109<H>  3.7   |  23  |  8.24<H>    Ca    9.9      02 Dec 2018 08:18    TPro  7.5  /  Alb  2.5<L>  /  TBili  0.3  /  DBili  x   /  AST  12  /  ALT  18  /  AlkPhos  92  12-01        CAPILLARY BLOOD GLUCOSE      POCT Blood Glucose.: 136 mg/dL (02 Dec 2018 11:44)  POCT Blood Glucose.: 109 mg/dL (02 Dec 2018 08:09)  POCT Blood Glucose.: 126 mg/dL (01 Dec 2018 22:03)  POCT Blood Glucose.: 168 mg/dL (01 Dec 2018 17:41)  POCT Blood Glucose.: 154 mg/dL (01 Dec 2018 16:15)

## 2018-12-03 LAB
ANION GAP SERPL CALC-SCNC: 11 MMOL/L — SIGNIFICANT CHANGE UP (ref 5–17)
BUN SERPL-MCNC: 53 MG/DL — HIGH (ref 7–18)
CALCIUM SERPL-MCNC: 10 MG/DL — SIGNIFICANT CHANGE UP (ref 8.4–10.5)
CHLORIDE SERPL-SCNC: 98 MMOL/L — SIGNIFICANT CHANGE UP (ref 96–108)
CO2 SERPL-SCNC: 25 MMOL/L — SIGNIFICANT CHANGE UP (ref 22–31)
CREAT SERPL-MCNC: 10 MG/DL — HIGH (ref 0.5–1.3)
GLUCOSE BLDC GLUCOMTR-MCNC: 109 MG/DL — HIGH (ref 70–99)
GLUCOSE BLDC GLUCOMTR-MCNC: 116 MG/DL — HIGH (ref 70–99)
GLUCOSE BLDC GLUCOMTR-MCNC: 120 MG/DL — HIGH (ref 70–99)
GLUCOSE BLDC GLUCOMTR-MCNC: 127 MG/DL — HIGH (ref 70–99)
GLUCOSE SERPL-MCNC: 120 MG/DL — HIGH (ref 70–99)
HCT VFR BLD CALC: 31.1 % — LOW (ref 39–50)
HGB BLD-MCNC: 10.1 G/DL — LOW (ref 13–17)
MCHC RBC-ENTMCNC: 31.1 PG — SIGNIFICANT CHANGE UP (ref 27–34)
MCHC RBC-ENTMCNC: 32.5 GM/DL — SIGNIFICANT CHANGE UP (ref 32–36)
MCV RBC AUTO: 95.5 FL — SIGNIFICANT CHANGE UP (ref 80–100)
PLATELET # BLD AUTO: 349 K/UL — SIGNIFICANT CHANGE UP (ref 150–400)
POTASSIUM SERPL-MCNC: 4.1 MMOL/L — SIGNIFICANT CHANGE UP (ref 3.5–5.3)
POTASSIUM SERPL-SCNC: 4.1 MMOL/L — SIGNIFICANT CHANGE UP (ref 3.5–5.3)
RBC # BLD: 3.26 M/UL — LOW (ref 4.2–5.8)
RBC # FLD: 13.9 % — SIGNIFICANT CHANGE UP (ref 10.3–14.5)
SODIUM SERPL-SCNC: 134 MMOL/L — LOW (ref 135–145)
WBC # BLD: 9.2 K/UL — SIGNIFICANT CHANGE UP (ref 3.8–10.5)
WBC # FLD AUTO: 9.2 K/UL — SIGNIFICANT CHANGE UP (ref 3.8–10.5)

## 2018-12-03 PROCEDURE — 72100 X-RAY EXAM L-S SPINE 2/3 VWS: CPT | Mod: 26

## 2018-12-03 PROCEDURE — 73590 X-RAY EXAM OF LOWER LEG: CPT | Mod: 26,RT

## 2018-12-03 RX ORDER — AMLODIPINE BESYLATE 2.5 MG/1
2.5 TABLET ORAL AT BEDTIME
Qty: 0 | Refills: 0 | Status: DISCONTINUED | OUTPATIENT
Start: 2018-12-03 | End: 2018-12-06

## 2018-12-03 RX ADMIN — HEPARIN SODIUM 5000 UNIT(S): 5000 INJECTION INTRAVENOUS; SUBCUTANEOUS at 12:01

## 2018-12-03 RX ADMIN — Medication 5 UNIT(S): at 17:21

## 2018-12-03 RX ADMIN — Medication 5 UNIT(S): at 11:59

## 2018-12-03 RX ADMIN — QUETIAPINE FUMARATE 200 MILLIGRAM(S): 200 TABLET, FILM COATED ORAL at 12:00

## 2018-12-03 RX ADMIN — HYDROMORPHONE HYDROCHLORIDE 1 MILLIGRAM(S): 2 INJECTION INTRAMUSCULAR; INTRAVENOUS; SUBCUTANEOUS at 17:49

## 2018-12-03 RX ADMIN — OXYCODONE HYDROCHLORIDE 5 MILLIGRAM(S): 5 TABLET ORAL at 07:44

## 2018-12-03 RX ADMIN — HYDROMORPHONE HYDROCHLORIDE 1 MILLIGRAM(S): 2 INJECTION INTRAMUSCULAR; INTRAVENOUS; SUBCUTANEOUS at 09:48

## 2018-12-03 RX ADMIN — SIMVASTATIN 40 MILLIGRAM(S): 20 TABLET, FILM COATED ORAL at 22:28

## 2018-12-03 RX ADMIN — SEVELAMER CARBONATE 1600 MILLIGRAM(S): 2400 POWDER, FOR SUSPENSION ORAL at 17:22

## 2018-12-03 RX ADMIN — PANTOPRAZOLE SODIUM 40 MILLIGRAM(S): 20 TABLET, DELAYED RELEASE ORAL at 06:14

## 2018-12-03 RX ADMIN — SEVELAMER CARBONATE 1600 MILLIGRAM(S): 2400 POWDER, FOR SUSPENSION ORAL at 12:00

## 2018-12-03 RX ADMIN — GABAPENTIN 400 MILLIGRAM(S): 400 CAPSULE ORAL at 05:36

## 2018-12-03 RX ADMIN — AMLODIPINE BESYLATE 2.5 MILLIGRAM(S): 2.5 TABLET ORAL at 22:28

## 2018-12-03 RX ADMIN — HYDROMORPHONE HYDROCHLORIDE 1 MILLIGRAM(S): 2 INJECTION INTRAMUSCULAR; INTRAVENOUS; SUBCUTANEOUS at 10:22

## 2018-12-03 RX ADMIN — Medication 100 MILLIGRAM(S): at 05:36

## 2018-12-03 RX ADMIN — OXYCODONE HYDROCHLORIDE 5 MILLIGRAM(S): 5 TABLET ORAL at 12:41

## 2018-12-03 RX ADMIN — OXYCODONE HYDROCHLORIDE 5 MILLIGRAM(S): 5 TABLET ORAL at 12:09

## 2018-12-03 RX ADMIN — HYDROMORPHONE HYDROCHLORIDE 1 MILLIGRAM(S): 2 INJECTION INTRAMUSCULAR; INTRAVENOUS; SUBCUTANEOUS at 03:00

## 2018-12-03 RX ADMIN — OXYCODONE HYDROCHLORIDE 5 MILLIGRAM(S): 5 TABLET ORAL at 06:12

## 2018-12-03 RX ADMIN — Medication 100 MILLIGRAM(S): at 22:29

## 2018-12-03 RX ADMIN — HYDROMORPHONE HYDROCHLORIDE 1 MILLIGRAM(S): 2 INJECTION INTRAMUSCULAR; INTRAVENOUS; SUBCUTANEOUS at 17:19

## 2018-12-03 RX ADMIN — HEPARIN SODIUM 5000 UNIT(S): 5000 INJECTION INTRAVENOUS; SUBCUTANEOUS at 22:28

## 2018-12-03 RX ADMIN — HYDROMORPHONE HYDROCHLORIDE 1 MILLIGRAM(S): 2 INJECTION INTRAMUSCULAR; INTRAVENOUS; SUBCUTANEOUS at 23:39

## 2018-12-03 RX ADMIN — HEPARIN SODIUM 5000 UNIT(S): 5000 INJECTION INTRAVENOUS; SUBCUTANEOUS at 05:36

## 2018-12-03 RX ADMIN — ESCITALOPRAM OXALATE 10 MILLIGRAM(S): 10 TABLET, FILM COATED ORAL at 13:47

## 2018-12-03 RX ADMIN — CINACALCET 30 MILLIGRAM(S): 30 TABLET, FILM COATED ORAL at 12:00

## 2018-12-03 RX ADMIN — Medication 5 UNIT(S): at 08:18

## 2018-12-03 RX ADMIN — GABAPENTIN 400 MILLIGRAM(S): 400 CAPSULE ORAL at 17:22

## 2018-12-03 RX ADMIN — MIRTAZAPINE 45 MILLIGRAM(S): 45 TABLET, ORALLY DISINTEGRATING ORAL at 12:00

## 2018-12-03 RX ADMIN — Medication 100 MILLIGRAM(S): at 12:00

## 2018-12-03 RX ADMIN — INSULIN GLARGINE 15 UNIT(S): 100 INJECTION, SOLUTION SUBCUTANEOUS at 22:29

## 2018-12-03 RX ADMIN — HYDROMORPHONE HYDROCHLORIDE 1 MILLIGRAM(S): 2 INJECTION INTRAMUSCULAR; INTRAVENOUS; SUBCUTANEOUS at 03:35

## 2018-12-03 RX ADMIN — SEVELAMER CARBONATE 1600 MILLIGRAM(S): 2400 POWDER, FOR SUSPENSION ORAL at 05:36

## 2018-12-03 NOTE — PROGRESS NOTE ADULT - ASSESSMENT
seen and examined vsstable afebrile  no complaints  still has some pain in rt leg but better, bud still needs pain meds   rt leg much better but still warm and mildy tender  cont IV antibx   ID to f/u

## 2018-12-03 NOTE — PROGRESS NOTE ADULT - ASSESSMENT
38 yr old male on with ESRD on HD TTS at [facility]. Very non-compliant with dialysis schedule and misses dialysis very frequently.  Was recently admitted in the in [place] after missing dialysis for a few days. Last dialysis was Wednesday last week. 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 Started on antibiotics and renal consulted as pt on hemodialysis.    # ESRD- HD in AM  # HTN uncontrolled. UF on HD.   #RLE cellulitis. cont clindamycin  #CKDMBD cont sensipar, renvela  # anemia of CKD cont procrit on HD

## 2018-12-03 NOTE — PROGRESS NOTE ADULT - SKIN COMMENTS
right lower leg warmth and erythema above ankle with mild fullness of area, tenderness positive, no fluctuance

## 2018-12-03 NOTE — PROGRESS NOTE ADULT - SUBJECTIVE AND OBJECTIVE BOX
no events overnight. remains with RT leg pain    aspirin (Swelling)  penicillins (Swelling)  shellfish (Unknown)    Hospital Medications:   MEDICATIONS  (STANDING):  amLODIPine   Tablet 2.5 milliGRAM(s) Oral at bedtime  cinacalcet 30 milliGRAM(s) Oral daily  clindamycin IVPB 600 milliGRAM(s) IV Intermittent every 8 hours  escitalopram 10 milliGRAM(s) Oral daily  gabapentin 400 milliGRAM(s) Oral two times a day  heparin  Injectable 5000 Unit(s) SubCutaneous every 8 hours  influenza   Vaccine 0.5 milliLiter(s) IntraMuscular once  insulin glargine Injectable (LANTUS) 15 Unit(s) SubCutaneous at bedtime  insulin lispro (HumaLOG) corrective regimen sliding scale   SubCutaneous Before meals and at bedtime  insulin lispro Injectable (HumaLOG) 5 Unit(s) SubCutaneous three times a day before meals  mirtazapine 45 milliGRAM(s) Oral daily  pantoprazole    Tablet 40 milliGRAM(s) Oral before breakfast  QUEtiapine 200 milliGRAM(s) Oral daily  sevelamer hydrochloride 1600 milliGRAM(s) Oral every 8 hours  simvastatin 40 milliGRAM(s) Oral at bedtime      VITALS:  T(F): 98.3 (12-03-18 @ 14:31), Max: 98.4 (12-02-18 @ 20:53)  HR: 75 (12-03-18 @ 14:31)  BP: 151/99 (12-03-18 @ 14:31)  RR: 16 (12-03-18 @ 14:31)  SpO2: 93% (12-03-18 @ 14:31)  Wt(kg): --    12-03 @ 07:01  -  12-03 @ 15:17  --------------------------------------------------------  IN: 0 mL / OUT: 400 mL / NET: -400 mL      Height (cm): 200.66 (12-03 @ 11:09)  Weight (kg): 160 (12-03 @ 11:09)  BMI (kg/m2): 39.7 (12-03 @ 11:09)  BSA (m2): 2.9 (12-03 @ 11:09)  PHYSICAL EXAM:  Constitutional: NAD  HEENT: anicteric sclera, oropharynx clear.  Neck: No JVD  Respiratory: CTAB, no wheezes, rales or rhonchi  Cardiovascular: S1, S2, RRR  Gastrointestinal: BS+, soft, NT/ND  Extremities:  bilateral leg peripheral edema. RLE swelling tender, erythema.  Neurological: A/O x 3, no focal deficits  Vascular Access: AVF with trill and bruit    LABS:  12-03    134<L>  |  98  |  53<H>  ----------------------------<  120<H>  4.1   |  25  |  10.00<H>    Ca    10.0      03 Dec 2018 06:27      Creatinine Trend: 10.00 <--, 8.24 <--, 10.60 <--, 13.20 <--, 13.20 <--                        10.1   9.2   )-----------( 349      ( 03 Dec 2018 06:27 )             31.1     Urine Studies:      RADIOLOGY & ADDITIONAL STUDIES:

## 2018-12-03 NOTE — PROGRESS NOTE ADULT - ASSESSMENT
Patient is a 37 y/o M from Home with History of ESRD on HD TTS via left arm AV fistula/graft x 2 years, HTN, HLD, DM, chronic back pain presented with complaint of right leg swelling and redness. Patient was admitted to [facility] and discharged on friday for missing dialysis. He then had a scrap on his leg and then developed redness and swelling at the site. admitted for Cellulitis of Right LE. on IV abx.

## 2018-12-03 NOTE — PROGRESS NOTE ADULT - SUBJECTIVE AND OBJECTIVE BOX
Patient is a 38y old  Male who presents with a chief complaint of right leg swelling , redness (02 Dec 2018 14:46)      INTERVAL HPI/OVERNIGHT EVENTS:  persistent LE pain, LE redness improving.        REVIEW OF SYSTEMS:  CONSTITUTIONAL: No fever, chills  ENMT:  No difficulty hearing, no change in vision  NECK: No pain or stiffness  RESPIRATORY: No cough, SOB  CARDIOVASCULAR: No chest pain, palpitations  GASTROINTESTINAL: No abdominal pain. No nausea, vomiting, or diarrhea  GENITOURINARY: No dysuria  NEUROLOGICAL: No HA  MUSCULOSKELETAL: +ve right lower extremity pain    T(C): 36.3 (12-03-18 @ 05:51), Max: 36.9 (12-02-18 @ 14:55)  HR: 75 (12-03-18 @ 05:51) (75 - 77)  BP: 152/82 (12-03-18 @ 05:51) (146/86 - 173/101)  RR: 16 (12-03-18 @ 05:51) (16 - 16)  SpO2: 98% (12-03-18 @ 05:51) (98% - 100%)  Wt(kg): --Vital Signs Last 24 Hrs  T(C): 36.3 (03 Dec 2018 05:51), Max: 36.9 (02 Dec 2018 14:55)  T(F): 97.4 (03 Dec 2018 05:51), Max: 98.4 (02 Dec 2018 14:55)  HR: 75 (03 Dec 2018 05:51) (75 - 77)  BP: 152/82 (03 Dec 2018 05:51) (146/86 - 173/101)  BP(mean): --  RR: 16 (03 Dec 2018 05:51) (16 - 16)  SpO2: 98% (03 Dec 2018 05:51) (98% - 100%)    MEDICATIONS  (STANDING):  cinacalcet 30 milliGRAM(s) Oral daily  clindamycin IVPB 600 milliGRAM(s) IV Intermittent every 8 hours  escitalopram 10 milliGRAM(s) Oral daily  gabapentin 400 milliGRAM(s) Oral two times a day  heparin  Injectable 5000 Unit(s) SubCutaneous every 8 hours  influenza   Vaccine 0.5 milliLiter(s) IntraMuscular once  insulin glargine Injectable (LANTUS) 15 Unit(s) SubCutaneous at bedtime  insulin lispro (HumaLOG) corrective regimen sliding scale   SubCutaneous Before meals and at bedtime  insulin lispro Injectable (HumaLOG) 5 Unit(s) SubCutaneous three times a day before meals  mirtazapine 45 milliGRAM(s) Oral daily  pantoprazole    Tablet 40 milliGRAM(s) Oral before breakfast  QUEtiapine 200 milliGRAM(s) Oral daily  sevelamer hydrochloride 1600 milliGRAM(s) Oral every 8 hours  simvastatin 40 milliGRAM(s) Oral at bedtime    MEDICATIONS  (PRN):  acetaminophen   Tablet .. 650 milliGRAM(s) Oral every 6 hours PRN Temp greater or equal to 38C (100.4F), Mild Pain (1 - 3)  HYDROmorphone  Injectable 1 milliGRAM(s) IV Push every 6 hours PRN Severe Pain (7 - 10)  oxyCODONE    IR 5 milliGRAM(s) Oral every 6 hours PRN Moderate Pain (4 - 6)      PHYSICAL EXAM:  GENERAL: NAD   EYES: clear conjunctiva   ENMT: Moist mucous membranes  NECK: Supple, No JVD   CHEST/LUNG: Clear to auscultation bilaterally; No rales, rhonchi, wheezing, or rubs  HEART: S1, S2, Regular rate and rhythm  ABDOMEN: Soft, Nontender, Nondistended; Bowel sounds present  NEURO: Alert & Oriented X3  EXTREMITIES: +ve RLE pain   SKIN: RLE redness and tenderness     Consultant(s) Notes Reviewed:  [x ] YES  [ ] NO  Care Discussed with Consultants/Other Providers [ x] YES  [ ] NO    LABS:                        10.1   9.2   )-----------( 349      ( 03 Dec 2018 06:27 )             31.1     12-03    134<L>  |  98  |  53<H>  ----------------------------<  120<H>  4.1   |  25  |  10.00<H>    Ca    10.0      03 Dec 2018 06:27    TPro  7.5  /  Alb  2.5<L>  /  TBili  0.3  /  DBili  x   /  AST  12  /  ALT  18  /  AlkPhos  92  12-01        CAPILLARY BLOOD GLUCOSE      POCT Blood Glucose.: 127 mg/dL (03 Dec 2018 07:48)  POCT Blood Glucose.: 120 mg/dL (02 Dec 2018 22:05)  POCT Blood Glucose.: 102 mg/dL (02 Dec 2018 16:57)  POCT Blood Glucose.: 136 mg/dL (02 Dec 2018 11:44)            RADIOLOGY & ADDITIONAL TESTS:    Imaging Personally Reviewed:  [ ] YES  [ ] NO

## 2018-12-03 NOTE — PROGRESS NOTE ADULT - SUBJECTIVE AND OBJECTIVE BOX
Patient is a 38y old  Male who presents with a chief complaint of right leg swelling , redness (29 Nov 2018 18:21)      HPI:  Patient is a 39 y/o M from Home with History of ESRD on HD TTS via left arm AV fistula/graft x 2 years, HTN, HLD, DM, chronic back pain presented with complaint of right leg swelling and redness. Patient was admitted to [facility] and discharged on friday for missing dialysis. He then had a scrap on his leg and then developed redness and swelling at the site. Patient also complaints of chills , nausea , NBNP vomiting. No fever, no chest pain , no sob , no dizziness , no headache.     In Ed , BP: 186/110 mm hg , HR: 71 , Temp: 98.7 F  Cbc shows elevated white count with left shift  Bmp with elevated BUN/ Creatinine   Lactate WNL. (29 Nov 2018 18:21)    Pt seen bedside in NAD, AAOx3. Pt states his right leg appears improved but is still painful and the pain has moved for medial on his leg.     PMH: ESRD on dialysis  Morbid obesity with BMI of 40.0-44.9, adult  Diabetes    Allergies: aspirin (Swelling)  penicillins (Swelling)  shellfish (Unknown)    Medications: acetaminophen   Tablet .. 650 milliGRAM(s) Oral every 6 hours PRN  cinacalcet 30 milliGRAM(s) Oral daily  clindamycin IVPB 600 milliGRAM(s) IV Intermittent every 8 hours  escitalopram 10 milliGRAM(s) Oral daily  gabapentin 400 milliGRAM(s) Oral two times a day  HYDROmorphone   Tablet 2 milliGRAM(s) Oral every 6 hours PRN  influenza   Vaccine 0.5 milliLiter(s) IntraMuscular once  insulin glargine Injectable (LANTUS) 15 Unit(s) SubCutaneous at bedtime  insulin lispro (HumaLOG) corrective regimen sliding scale   SubCutaneous Before meals and at bedtime  insulin lispro Injectable (HumaLOG) 5 Unit(s) SubCutaneous three times a day before meals  mirtazapine 45 milliGRAM(s) Oral daily  pantoprazole    Tablet 40 milliGRAM(s) Oral before breakfast  QUEtiapine 200 milliGRAM(s) Oral daily  sevelamer hydrochloride 1600 milliGRAM(s) Oral every 8 hours  simvastatin 40 milliGRAM(s) Oral at bedtime    FH:Family history of diabetes mellitus (Mother)    PSX: H/O hernia repair    SH: acetaminophen   Tablet .. 650 milliGRAM(s) Oral every 6 hours PRN  cinacalcet 30 milliGRAM(s) Oral daily  clindamycin IVPB 600 milliGRAM(s) IV Intermittent every 8 hours  escitalopram 10 milliGRAM(s) Oral daily  gabapentin 400 milliGRAM(s) Oral two times a day  HYDROmorphone   Tablet 2 milliGRAM(s) Oral every 6 hours PRN  influenza   Vaccine 0.5 milliLiter(s) IntraMuscular once  insulin glargine Injectable (LANTUS) 15 Unit(s) SubCutaneous at bedtime  insulin lispro (HumaLOG) corrective regimen sliding scale   SubCutaneous Before meals and at bedtime  insulin lispro Injectable (HumaLOG) 5 Unit(s) SubCutaneous three times a day before meals  mirtazapine 45 milliGRAM(s) Oral daily  pantoprazole    Tablet 40 milliGRAM(s) Oral before breakfast  QUEtiapine 200 milliGRAM(s) Oral daily  sevelamer hydrochloride 1600 milliGRAM(s) Oral every 8 hours  simvastatin 40 milliGRAM(s) Oral at bedtime      ICU Vital Signs Last 24 Hrs  T(C): 36.7 (03 Dec 2018 11:09), Max: 36.9 (02 Dec 2018 14:55)  T(F): 98 (03 Dec 2018 11:09), Max: 98.4 (02 Dec 2018 14:55)  HR: 73 (03 Dec 2018 11:09) (73 - 77)  BP: 160/94 (03 Dec 2018 11:09) (146/86 - 173/101)  BP(mean): --  ABP: --  ABP(mean): --  RR: 16 (03 Dec 2018 11:09) (16 - 16)  SpO2: 96% (03 Dec 2018 11:09) (96% - 100%)        LABS                        10.1   9.2   )-----------( 349      ( 03 Dec 2018 06:27 )             31.1   12-03    134<L>  |  98  |  53<H>  ----------------------------<  120<H>  4.1   |  25  |  10.00<H>    Ca    10.0      03 Dec 2018 06:27    TPro  7.5  /  Alb  2.5<L>  /  TBili  0.3  /  DBili  x   /  AST  12  /  ALT  18  /  AlkPhos  92  12-01        PHYSICAL EXAM  GEN: PRESTON JOHNSON JR is a pleasant well-nourished, well developed 38y Male in no acute distress, alert awake, and oriented to person, place and time.   LE Focused:    Vasc:  DP/PT palpable, CFT <3 sec, TG warm to cool, Edema to R leg  Derm: Erythema noted to R light, no open lesions, right anterior wound completely epithelialized   Neuro: Protective sensation intact      < from: Xray Tibia + Fibula 2 Views, Right (11.29.18 @ 20:13) >    EXAM:  LEG AP&LAT - RIGHT                            PROCEDURE DATE:  11/29/2018          INTERPRETATION:  CLINICAL INDICATION:  Right lower extremity cellulitis;   evaluate for osteomyelitis.    COMPARISON:  None    TECHNIQUE:  AP and lateral radiographs of the right lower leg performed.    FINDINGS:  There is no evidence for acute or chronic fracture deformity   of the right tibia or fibula. No regions of osteolysis or osteosclerosis   noted. Knee and ankle articulations appear intact. Soft tissue   calcifications are evident. No retained metallic foreign bodies are noted.    IMPRESSION:  As above.        LISA KAPLAN   This document has been electronically signed. Nov 29 2018  8:25PM              < end of copied text >      A:  R leg cellulitis    P:  Patient evaluated, chart reviewed.   X-ray reviewed as above  IV abx per ID   no dressing needed  Podiatry to follow while in house  Seen With Dr. Tse

## 2018-12-03 NOTE — PROGRESS NOTE ADULT - SUBJECTIVE AND OBJECTIVE BOX
38y Male    Meds:  clindamycin IVPB 600 milliGRAM(s) IV Intermittent every 8 hours    Allergies    aspirin (Swelling)  penicillins (Swelling)  shellfish (Unknown)    Intolerances        VITALS:  Vital Signs Last 24 Hrs  T(C): 36.8 (03 Dec 2018 14:31), Max: 36.9 (02 Dec 2018 20:53)  T(F): 98.3 (03 Dec 2018 14:31), Max: 98.4 (02 Dec 2018 20:53)  HR: 75 (03 Dec 2018 14:31) (73 - 77)  BP: 151/99 (03 Dec 2018 14:31) (151/99 - 173/101)  BP(mean): --  RR: 16 (03 Dec 2018 14:31) (16 - 16)  SpO2: 93% (03 Dec 2018 14:31) (93% - 100%)    LABS/DIAGNOSTIC TESTS:                          10.1   9.2   )-----------( 349      ( 03 Dec 2018 06:27 )             31.1         12-03    134<L>  |  98  |  53<H>  ----------------------------<  120<H>  4.1   |  25  |  10.00<H>    Ca    10.0      03 Dec 2018 06:27            CULTURES: .Blood Blood  11-30 @ 23:45   No growth to date.  --  --      .Blood Blood  11-29 @ 18:29   No growth to date.  --  --            RADIOLOGY:      ROS:  [  ] UNABLE TO ELICIT 38y Male who c/o right leg pain still, no fevers or chills, no diarrhea but has constipation.    Meds:  clindamycin IVPB 600 milliGRAM(s) IV Intermittent every 8 hours    Allergies    aspirin (Swelling)  penicillins (Swelling)  shellfish (Unknown)    Intolerances        VITALS:  Vital Signs Last 24 Hrs  T(C): 36.8 (03 Dec 2018 14:31), Max: 36.9 (02 Dec 2018 20:53)  T(F): 98.3 (03 Dec 2018 14:31), Max: 98.4 (02 Dec 2018 20:53)  HR: 75 (03 Dec 2018 14:31) (73 - 77)  BP: 151/99 (03 Dec 2018 14:31) (151/99 - 173/101)  BP(mean): --  RR: 16 (03 Dec 2018 14:31) (16 - 16)  SpO2: 93% (03 Dec 2018 14:31) (93% - 100%)    LABS/DIAGNOSTIC TESTS:                          10.1   9.2   )-----------( 349      ( 03 Dec 2018 06:27 )             31.1         12-03    134<L>  |  98  |  53<H>  ----------------------------<  120<H>  4.1   |  25  |  10.00<H>    Ca    10.0      03 Dec 2018 06:27            CULTURES: .Blood Blood  11-30 @ 23:45   No growth to date.  --  --      .Blood Blood  11-29 @ 18:29   No growth to date.  --  --            RADIOLOGY:      ROS:  [  ] UNABLE TO ELICIT

## 2018-12-03 NOTE — PROGRESS NOTE ADULT - PROBLEM SELECTOR PLAN 1
-Patient admitted for right LE pain , swelling , redness ; with open ulcer.   - cont Clindamycin  as per ID  -f/u blood cultures, NTD   -podiatry following   -Pain mgmt  -Elevate Extremity

## 2018-12-03 NOTE — PROGRESS NOTE ADULT - SUBJECTIVE AND OBJECTIVE BOX
HPI:  Patient is a 37 y/o M from Home with History of ESRD on HD TTS via left arm AV fistula/graft x 2 years, HTN, HLD, DM, chronic back pain presented with complaint of right leg swelling and redness. Patient was admitted to [facility] and discharged on friday for missing dialysis. He then had a scrap on his leg and then developed redness and swelling at the site. Patient also complaints of chills , nausea , NBNP vomiting. No fever, no chest pain , no sob , no dizziness , no headache.     In Ed , BP: 186/110 mm hg , HR: 71 , Temp: 98.7 F  Cbc shows elevated white count with left shift  Bmp with elevated BUN/ Creatinine   Lactate WNL. (29 Nov 2018 18:21)      Patient is a 38y old  Male who presents with a chief complaint of right leg swelling , redness (03 Dec 2018 12:23)      INTERVAL HPI/OVERNIGHT EVENTS:  T(C): 36.7 (12-03-18 @ 11:09), Max: 36.9 (12-02-18 @ 14:55)  HR: 73 (12-03-18 @ 11:09) (73 - 77)  BP: 160/94 (12-03-18 @ 11:09) (146/86 - 173/101)  RR: 16 (12-03-18 @ 11:09) (16 - 16)  SpO2: 96% (12-03-18 @ 11:09) (96% - 100%)  Wt(kg): --  I&O's Summary    03 Dec 2018 07:01  -  03 Dec 2018 13:36  --------------------------------------------------------  IN: 0 mL / OUT: 400 mL / NET: -400 mL        REVIEW OF SYSTEMS: denies fever, chills, SOB, palpitations, chest pain, abdominal pain, nausea, vomitting, diarrhea, constipation, dizziness    MEDICATIONS  (STANDING):  amLODIPine   Tablet 2.5 milliGRAM(s) Oral at bedtime  cinacalcet 30 milliGRAM(s) Oral daily  clindamycin IVPB 600 milliGRAM(s) IV Intermittent every 8 hours  escitalopram 10 milliGRAM(s) Oral daily  gabapentin 400 milliGRAM(s) Oral two times a day  heparin  Injectable 5000 Unit(s) SubCutaneous every 8 hours  influenza   Vaccine 0.5 milliLiter(s) IntraMuscular once  insulin glargine Injectable (LANTUS) 15 Unit(s) SubCutaneous at bedtime  insulin lispro (HumaLOG) corrective regimen sliding scale   SubCutaneous Before meals and at bedtime  insulin lispro Injectable (HumaLOG) 5 Unit(s) SubCutaneous three times a day before meals  mirtazapine 45 milliGRAM(s) Oral daily  pantoprazole    Tablet 40 milliGRAM(s) Oral before breakfast  QUEtiapine 200 milliGRAM(s) Oral daily  sevelamer hydrochloride 1600 milliGRAM(s) Oral every 8 hours  simvastatin 40 milliGRAM(s) Oral at bedtime    MEDICATIONS  (PRN):  acetaminophen   Tablet .. 650 milliGRAM(s) Oral every 6 hours PRN Temp greater or equal to 38C (100.4F), Mild Pain (1 - 3)  HYDROmorphone  Injectable 1 milliGRAM(s) IV Push every 6 hours PRN Severe Pain (7 - 10)  oxyCODONE    IR 5 milliGRAM(s) Oral every 6 hours PRN Moderate Pain (4 - 6)      PHYSICAL EXAM:  GENERAL: NAD, well-groomed, well-developed  HEAD:  Atraumatic, Normocephalic  EYES: EOMI, PERRLA, conjunctiva and sclera clear  ENMT: No tonsillar erythema, exudates, or enlargement; Moist mucous membranes, Good dentition, No lesions  NECK: Supple, No JVD, Normal thyroid  NERVOUS SYSTEM:  Alert & Oriented X3, Good concentration; Motor Strength 5/5 B/L upper and lower extremities; DTRs 2+ intact and symmetric  CHEST/LUNG: Clear to percussion bilaterally; No rales, rhonchi, wheezing, or rubs  HEART: Regular rate and rhythm; No murmurs, rubs, or gallops  ABDOMEN: Soft, Nontender, Nondistended; Bowel sounds present  EXTREMITIES:  2+ Peripheral Pulses, No clubbing, cyanosis, or edema  LYMPH: No lymphadenopathy noted  SKIN: No rashes or lesions  LABS:                        10.1   9.2   )-----------( 349      ( 03 Dec 2018 06:27 )             31.1     12-03    134<L>  |  98  |  53<H>  ----------------------------<  120<H>  4.1   |  25  |  10.00<H>    Ca    10.0      03 Dec 2018 06:27    TPro  7.5  /  Alb  2.5<L>  /  TBili  0.3  /  DBili  x   /  AST  12  /  ALT  18  /  AlkPhos  92  12-01        CAPILLARY BLOOD GLUCOSE      POCT Blood Glucose.: 109 mg/dL (03 Dec 2018 11:35)  POCT Blood Glucose.: 127 mg/dL (03 Dec 2018 07:48)  POCT Blood Glucose.: 120 mg/dL (02 Dec 2018 22:05)  POCT Blood Glucose.: 102 mg/dL (02 Dec 2018 16:57)

## 2018-12-04 LAB
ANION GAP SERPL CALC-SCNC: 14 MMOL/L — SIGNIFICANT CHANGE UP (ref 5–17)
BUN SERPL-MCNC: 64 MG/DL — HIGH (ref 7–18)
CALCIUM SERPL-MCNC: 10.4 MG/DL — SIGNIFICANT CHANGE UP (ref 8.4–10.5)
CHLORIDE SERPL-SCNC: 97 MMOL/L — SIGNIFICANT CHANGE UP (ref 96–108)
CO2 SERPL-SCNC: 24 MMOL/L — SIGNIFICANT CHANGE UP (ref 22–31)
CREAT SERPL-MCNC: 11.6 MG/DL — HIGH (ref 0.5–1.3)
CULTURE RESULTS: SIGNIFICANT CHANGE UP
CULTURE RESULTS: SIGNIFICANT CHANGE UP
GLUCOSE BLDC GLUCOMTR-MCNC: 103 MG/DL — HIGH (ref 70–99)
GLUCOSE BLDC GLUCOMTR-MCNC: 111 MG/DL — HIGH (ref 70–99)
GLUCOSE BLDC GLUCOMTR-MCNC: 119 MG/DL — HIGH (ref 70–99)
GLUCOSE BLDC GLUCOMTR-MCNC: 124 MG/DL — HIGH (ref 70–99)
GLUCOSE SERPL-MCNC: 109 MG/DL — HIGH (ref 70–99)
HCT VFR BLD CALC: 31.1 % — LOW (ref 39–50)
HGB BLD-MCNC: 10 G/DL — LOW (ref 13–17)
MCHC RBC-ENTMCNC: 30.5 PG — SIGNIFICANT CHANGE UP (ref 27–34)
MCHC RBC-ENTMCNC: 32.1 GM/DL — SIGNIFICANT CHANGE UP (ref 32–36)
MCV RBC AUTO: 95 FL — SIGNIFICANT CHANGE UP (ref 80–100)
PLATELET # BLD AUTO: 388 K/UL — SIGNIFICANT CHANGE UP (ref 150–400)
POTASSIUM SERPL-MCNC: 3.9 MMOL/L — SIGNIFICANT CHANGE UP (ref 3.5–5.3)
POTASSIUM SERPL-SCNC: 3.9 MMOL/L — SIGNIFICANT CHANGE UP (ref 3.5–5.3)
RBC # BLD: 3.28 M/UL — LOW (ref 4.2–5.8)
RBC # FLD: 14 % — SIGNIFICANT CHANGE UP (ref 10.3–14.5)
SODIUM SERPL-SCNC: 135 MMOL/L — SIGNIFICANT CHANGE UP (ref 135–145)
SPECIMEN SOURCE: SIGNIFICANT CHANGE UP
SPECIMEN SOURCE: SIGNIFICANT CHANGE UP
WBC # BLD: 10.5 K/UL — SIGNIFICANT CHANGE UP (ref 3.8–10.5)
WBC # FLD AUTO: 10.5 K/UL — SIGNIFICANT CHANGE UP (ref 3.8–10.5)

## 2018-12-04 RX ORDER — ERYTHROPOIETIN 10000 [IU]/ML
2000 INJECTION, SOLUTION INTRAVENOUS; SUBCUTANEOUS
Qty: 0 | Refills: 0 | Status: DISCONTINUED | OUTPATIENT
Start: 2018-12-04 | End: 2018-12-06

## 2018-12-04 RX ADMIN — Medication 5 UNIT(S): at 17:34

## 2018-12-04 RX ADMIN — GABAPENTIN 400 MILLIGRAM(S): 400 CAPSULE ORAL at 17:33

## 2018-12-04 RX ADMIN — AMLODIPINE BESYLATE 2.5 MILLIGRAM(S): 2.5 TABLET ORAL at 22:42

## 2018-12-04 RX ADMIN — Medication 5 UNIT(S): at 08:44

## 2018-12-04 RX ADMIN — HYDROMORPHONE HYDROCHLORIDE 1 MILLIGRAM(S): 2 INJECTION INTRAMUSCULAR; INTRAVENOUS; SUBCUTANEOUS at 00:00

## 2018-12-04 RX ADMIN — Medication 100 MILLIGRAM(S): at 05:37

## 2018-12-04 RX ADMIN — ERYTHROPOIETIN 2000 UNIT(S): 10000 INJECTION, SOLUTION INTRAVENOUS; SUBCUTANEOUS at 12:16

## 2018-12-04 RX ADMIN — SEVELAMER CARBONATE 1600 MILLIGRAM(S): 2400 POWDER, FOR SUSPENSION ORAL at 05:37

## 2018-12-04 RX ADMIN — SEVELAMER CARBONATE 1600 MILLIGRAM(S): 2400 POWDER, FOR SUSPENSION ORAL at 22:42

## 2018-12-04 RX ADMIN — HYDROMORPHONE HYDROCHLORIDE 1 MILLIGRAM(S): 2 INJECTION INTRAMUSCULAR; INTRAVENOUS; SUBCUTANEOUS at 17:30

## 2018-12-04 RX ADMIN — HYDROMORPHONE HYDROCHLORIDE 1 MILLIGRAM(S): 2 INJECTION INTRAMUSCULAR; INTRAVENOUS; SUBCUTANEOUS at 11:15

## 2018-12-04 RX ADMIN — PANTOPRAZOLE SODIUM 40 MILLIGRAM(S): 20 TABLET, DELAYED RELEASE ORAL at 11:08

## 2018-12-04 RX ADMIN — INSULIN GLARGINE 15 UNIT(S): 100 INJECTION, SOLUTION SUBCUTANEOUS at 22:58

## 2018-12-04 RX ADMIN — HYDROMORPHONE HYDROCHLORIDE 1 MILLIGRAM(S): 2 INJECTION INTRAMUSCULAR; INTRAVENOUS; SUBCUTANEOUS at 05:38

## 2018-12-04 RX ADMIN — CINACALCET 30 MILLIGRAM(S): 30 TABLET, FILM COATED ORAL at 17:33

## 2018-12-04 RX ADMIN — GABAPENTIN 400 MILLIGRAM(S): 400 CAPSULE ORAL at 05:37

## 2018-12-04 RX ADMIN — QUETIAPINE FUMARATE 200 MILLIGRAM(S): 200 TABLET, FILM COATED ORAL at 17:34

## 2018-12-04 RX ADMIN — HEPARIN SODIUM 5000 UNIT(S): 5000 INJECTION INTRAVENOUS; SUBCUTANEOUS at 05:37

## 2018-12-04 RX ADMIN — HYDROMORPHONE HYDROCHLORIDE 1 MILLIGRAM(S): 2 INJECTION INTRAMUSCULAR; INTRAVENOUS; SUBCUTANEOUS at 10:45

## 2018-12-04 RX ADMIN — Medication 100 MILLIGRAM(S): at 22:41

## 2018-12-04 RX ADMIN — HYDROMORPHONE HYDROCHLORIDE 1 MILLIGRAM(S): 2 INJECTION INTRAMUSCULAR; INTRAVENOUS; SUBCUTANEOUS at 18:00

## 2018-12-04 RX ADMIN — ESCITALOPRAM OXALATE 10 MILLIGRAM(S): 10 TABLET, FILM COATED ORAL at 17:33

## 2018-12-04 RX ADMIN — SIMVASTATIN 40 MILLIGRAM(S): 20 TABLET, FILM COATED ORAL at 22:41

## 2018-12-04 RX ADMIN — MIRTAZAPINE 45 MILLIGRAM(S): 45 TABLET, ORALLY DISINTEGRATING ORAL at 17:33

## 2018-12-04 RX ADMIN — HEPARIN SODIUM 5000 UNIT(S): 5000 INJECTION INTRAVENOUS; SUBCUTANEOUS at 22:42

## 2018-12-04 RX ADMIN — Medication 100 MILLIGRAM(S): at 17:32

## 2018-12-04 RX ADMIN — HYDROMORPHONE HYDROCHLORIDE 1 MILLIGRAM(S): 2 INJECTION INTRAMUSCULAR; INTRAVENOUS; SUBCUTANEOUS at 07:00

## 2018-12-04 NOTE — PROGRESS NOTE ADULT - SUBJECTIVE AND OBJECTIVE BOX
still with RT leg pain    aspirin (Swelling)  penicillins (Swelling)  shellfish (Unknown)    Hospital Medications:   MEDICATIONS  (STANDING):  amLODIPine   Tablet 2.5 milliGRAM(s) Oral at bedtime  cinacalcet 30 milliGRAM(s) Oral daily  clindamycin IVPB 600 milliGRAM(s) IV Intermittent every 8 hours  epoetin cyndie Injectable 2000 Unit(s) IV Push <User Schedule>  escitalopram 10 milliGRAM(s) Oral daily  gabapentin 400 milliGRAM(s) Oral two times a day  heparin  Injectable 5000 Unit(s) SubCutaneous every 8 hours  influenza   Vaccine 0.5 milliLiter(s) IntraMuscular once  insulin glargine Injectable (LANTUS) 15 Unit(s) SubCutaneous at bedtime  insulin lispro (HumaLOG) corrective regimen sliding scale   SubCutaneous Before meals and at bedtime  insulin lispro Injectable (HumaLOG) 5 Unit(s) SubCutaneous three times a day before meals  mirtazapine 45 milliGRAM(s) Oral daily  pantoprazole    Tablet 40 milliGRAM(s) Oral before breakfast  QUEtiapine 200 milliGRAM(s) Oral daily  sevelamer hydrochloride 1600 milliGRAM(s) Oral every 8 hours  simvastatin 40 milliGRAM(s) Oral at bedtime      VITALS:  T(F): 98.8 (12-04-18 @ 05:15), Max: 98.8 (12-04-18 @ 05:15)  HR: 81 (12-04-18 @ 05:15)  BP: 136/97 (12-04-18 @ 05:15)  RR: 18 (12-04-18 @ 05:15)  SpO2: 97% (12-04-18 @ 05:15)  Wt(kg): --    12-03 @ 07:01  -  12-04 @ 07:00  --------------------------------------------------------  IN: 0 mL / OUT: 400 mL / NET: -400 mL        PHYSICAL EXAM:  Constitutional: NAD  HEENT: anicteric sclera, oropharynx clear.  Neck: No JVD  Respiratory: CTAB, no wheezes, rales or rhonchi  Cardiovascular: S1, S2, RRR  Gastrointestinal: BS+, soft, NT/ND  Extremities:  peripheral edema+. RLE extremity swollen, tender.  Neurological: A/O x 3, no focal deficits  Vascular Access: AVF with thrill and bruit    LABS:  12-04    135  |  97  |  64<H>  ----------------------------<  109<H>  3.9   |  24  |  11.60<H>    Ca    10.4      04 Dec 2018 10:35      Creatinine Trend: 11.60 <--, 10.00 <--, 8.24 <--, 10.60 <--, 13.20 <--, 13.20 <--                        10.0   10.5  )-----------( 388      ( 04 Dec 2018 10:35 )             31.1     Urine Studies:      RADIOLOGY & ADDITIONAL STUDIES:

## 2018-12-04 NOTE — PROGRESS NOTE ADULT - ASSESSMENT
38 yr old male on with ESRD on HD TTS at [facility]. Very non-compliant with dialysis schedule and misses dialysis very frequently.  Was recently admitted in the in [place] after missing dialysis for a few days and RT leg cellulitis.    # ESRD- HD today.  # HTN controlled.   #RLE cellulitis. cont clindamycin  #CKDMBD cont sensipar, renvela  # anemia of CKD cont procrit on HD   # DM2 cont lantus

## 2018-12-04 NOTE — PROGRESS NOTE ADULT - SUBJECTIVE AND OBJECTIVE BOX
HPI:  Patient is a 37 y/o M from Home with History of ESRD on HD TTS via left arm AV fistula/graft x 2 years, HTN, HLD, DM, chronic back pain presented with complaint of right leg swelling and redness. Patient was admitted to [facility] and discharged on friday for missing dialysis. He then had a scrap on his leg and then developed redness and swelling at the site. Patient also complaints of chills , nausea , NBNP vomiting. No fever, no chest pain , no sob , no dizziness , no headache.     In Ed , BP: 186/110 mm hg , HR: 71 , Temp: 98.7 F  Cbc shows elevated white count with left shift  Bmp with elevated BUN/ Creatinine   Lactate WNL. (29 Nov 2018 18:21)      Patient is a 38y old  Male who presents with a chief complaint of right leg swelling , redness (04 Dec 2018 11:27)      INTERVAL HPI/OVERNIGHT EVENTS:  T(C): 37.1 (12-04-18 @ 05:15), Max: 37.1 (12-04-18 @ 05:15)  HR: 81 (12-04-18 @ 05:15) (75 - 81)  BP: 136/97 (12-04-18 @ 05:15) (136/97 - 151/99)  RR: 18 (12-04-18 @ 05:15) (16 - 20)  SpO2: 97% (12-04-18 @ 05:15) (93% - 98%)  Wt(kg): --  I&O's Summary    03 Dec 2018 07:01  -  04 Dec 2018 07:00  --------------------------------------------------------  IN: 0 mL / OUT: 400 mL / NET: -400 mL        REVIEW OF SYSTEMS: denies fever, chills, SOB, palpitations, chest pain, abdominal pain, nausea, vomitting, diarrhea, constipation, dizziness    MEDICATIONS  (STANDING):  amLODIPine   Tablet 2.5 milliGRAM(s) Oral at bedtime  cinacalcet 30 milliGRAM(s) Oral daily  clindamycin IVPB 600 milliGRAM(s) IV Intermittent every 8 hours  epoetin cyndie Injectable 2000 Unit(s) IV Push <User Schedule>  escitalopram 10 milliGRAM(s) Oral daily  gabapentin 400 milliGRAM(s) Oral two times a day  heparin  Injectable 5000 Unit(s) SubCutaneous every 8 hours  influenza   Vaccine 0.5 milliLiter(s) IntraMuscular once  insulin glargine Injectable (LANTUS) 15 Unit(s) SubCutaneous at bedtime  insulin lispro (HumaLOG) corrective regimen sliding scale   SubCutaneous Before meals and at bedtime  insulin lispro Injectable (HumaLOG) 5 Unit(s) SubCutaneous three times a day before meals  mirtazapine 45 milliGRAM(s) Oral daily  pantoprazole    Tablet 40 milliGRAM(s) Oral before breakfast  QUEtiapine 200 milliGRAM(s) Oral daily  sevelamer hydrochloride 1600 milliGRAM(s) Oral every 8 hours  simvastatin 40 milliGRAM(s) Oral at bedtime    MEDICATIONS  (PRN):  acetaminophen   Tablet .. 650 milliGRAM(s) Oral every 6 hours PRN Temp greater or equal to 38C (100.4F), Mild Pain (1 - 3)  HYDROmorphone  Injectable 1 milliGRAM(s) IV Push every 6 hours PRN Severe Pain (7 - 10)  oxyCODONE    IR 5 milliGRAM(s) Oral every 6 hours PRN Moderate Pain (4 - 6)      PHYSICAL EXAM:  GENERAL: NAD, well-groomed, well-developed  HEAD:  Atraumatic, Normocephalic  EYES: EOMI, PERRLA, conjunctiva and sclera clear  ENMT: No tonsillar erythema, exudates, or enlargement; Moist mucous membranes, Good dentition, No lesions  NECK: Supple, No JVD, Normal thyroid  NERVOUS SYSTEM:  Alert & Oriented X3, Good concentration; Motor Strength 5/5 B/L upper and lower extremities; DTRs 2+ intact and symmetric  CHEST/LUNG: Clear to percussion bilaterally; No rales, rhonchi, wheezing, or rubs  HEART: Regular rate and rhythm; No murmurs, rubs, or gallops  ABDOMEN: Soft, Nontender, Nondistended; Bowel sounds present  EXTREMITIES:  2+ Peripheral Pulses, No clubbing, cyanosis, or edema  LYMPH: No lymphadenopathy noted  SKIN: No rashes or lesions  LABS:                        10.0   10.5  )-----------( 388      ( 04 Dec 2018 10:35 )             31.1     12-04    135  |  97  |  64<H>  ----------------------------<  109<H>  3.9   |  24  |  11.60<H>    Ca    10.4      04 Dec 2018 10:35          CAPILLARY BLOOD GLUCOSE      POCT Blood Glucose.: 111 mg/dL (04 Dec 2018 12:10)  POCT Blood Glucose.: 119 mg/dL (04 Dec 2018 08:20)  POCT Blood Glucose.: 116 mg/dL (03 Dec 2018 22:12)  POCT Blood Glucose.: 120 mg/dL (03 Dec 2018 16:50)

## 2018-12-04 NOTE — PROGRESS NOTE ADULT - ASSESSMENT
kenny nd examined  comfortable on bed not in any distress, no complaints.  vs stable afebrile  physical unchanged    rt lower extremity better but ?? fluctuation on lower leg  mri without contrast to r/o collection   cont iv antibx

## 2018-12-05 DIAGNOSIS — M79.604 PAIN IN RIGHT LEG: ICD-10-CM

## 2018-12-05 LAB
ANION GAP SERPL CALC-SCNC: 13 MMOL/L — SIGNIFICANT CHANGE UP (ref 5–17)
BUN SERPL-MCNC: 46 MG/DL — HIGH (ref 7–18)
CALCIUM SERPL-MCNC: 9.5 MG/DL — SIGNIFICANT CHANGE UP (ref 8.4–10.5)
CHLORIDE SERPL-SCNC: 96 MMOL/L — SIGNIFICANT CHANGE UP (ref 96–108)
CO2 SERPL-SCNC: 24 MMOL/L — SIGNIFICANT CHANGE UP (ref 22–31)
CREAT SERPL-MCNC: 9.06 MG/DL — HIGH (ref 0.5–1.3)
GLUCOSE BLDC GLUCOMTR-MCNC: 106 MG/DL — HIGH (ref 70–99)
GLUCOSE BLDC GLUCOMTR-MCNC: 112 MG/DL — HIGH (ref 70–99)
GLUCOSE BLDC GLUCOMTR-MCNC: 117 MG/DL — HIGH (ref 70–99)
GLUCOSE BLDC GLUCOMTR-MCNC: 162 MG/DL — HIGH (ref 70–99)
GLUCOSE SERPL-MCNC: 150 MG/DL — HIGH (ref 70–99)
HCT VFR BLD CALC: 30.5 % — LOW (ref 39–50)
HGB BLD-MCNC: 10 G/DL — LOW (ref 13–17)
MCHC RBC-ENTMCNC: 30.2 PG — SIGNIFICANT CHANGE UP (ref 27–34)
MCHC RBC-ENTMCNC: 32.6 GM/DL — SIGNIFICANT CHANGE UP (ref 32–36)
MCV RBC AUTO: 92.6 FL — SIGNIFICANT CHANGE UP (ref 80–100)
PLATELET # BLD AUTO: 358 K/UL — SIGNIFICANT CHANGE UP (ref 150–400)
POTASSIUM SERPL-MCNC: 4 MMOL/L — SIGNIFICANT CHANGE UP (ref 3.5–5.3)
POTASSIUM SERPL-SCNC: 4 MMOL/L — SIGNIFICANT CHANGE UP (ref 3.5–5.3)
RBC # BLD: 3.3 M/UL — LOW (ref 4.2–5.8)
RBC # FLD: 13.3 % — SIGNIFICANT CHANGE UP (ref 10.3–14.5)
SODIUM SERPL-SCNC: 133 MMOL/L — LOW (ref 135–145)
WBC # BLD: 9.2 K/UL — SIGNIFICANT CHANGE UP (ref 3.8–10.5)
WBC # FLD AUTO: 9.2 K/UL — SIGNIFICANT CHANGE UP (ref 3.8–10.5)

## 2018-12-05 PROCEDURE — 73718 MRI LOWER EXTREMITY W/O DYE: CPT | Mod: 26,RT

## 2018-12-05 RX ORDER — ACETAMINOPHEN 500 MG
1000 TABLET ORAL ONCE
Qty: 0 | Refills: 0 | Status: COMPLETED | OUTPATIENT
Start: 2018-12-05 | End: 2018-12-05

## 2018-12-05 RX ORDER — ACETAMINOPHEN 500 MG
1000 TABLET ORAL ONCE
Qty: 0 | Refills: 0 | Status: COMPLETED | OUTPATIENT
Start: 2018-12-06 | End: 2018-12-06

## 2018-12-05 RX ORDER — ACETAMINOPHEN 500 MG
1000 TABLET ORAL ONCE
Qty: 0 | Refills: 0 | Status: DISCONTINUED | OUTPATIENT
Start: 2018-12-06 | End: 2018-12-06

## 2018-12-05 RX ORDER — POLYETHYLENE GLYCOL 3350 17 G/17G
17 POWDER, FOR SOLUTION ORAL DAILY
Qty: 0 | Refills: 0 | Status: DISCONTINUED | OUTPATIENT
Start: 2018-12-05 | End: 2018-12-06

## 2018-12-05 RX ADMIN — HEPARIN SODIUM 5000 UNIT(S): 5000 INJECTION INTRAVENOUS; SUBCUTANEOUS at 22:01

## 2018-12-05 RX ADMIN — HEPARIN SODIUM 5000 UNIT(S): 5000 INJECTION INTRAVENOUS; SUBCUTANEOUS at 13:59

## 2018-12-05 RX ADMIN — AMLODIPINE BESYLATE 2.5 MILLIGRAM(S): 2.5 TABLET ORAL at 22:01

## 2018-12-05 RX ADMIN — PANTOPRAZOLE SODIUM 40 MILLIGRAM(S): 20 TABLET, DELAYED RELEASE ORAL at 07:36

## 2018-12-05 RX ADMIN — Medication 400 MILLIGRAM(S): at 23:02

## 2018-12-05 RX ADMIN — HYDROMORPHONE HYDROCHLORIDE 1 MILLIGRAM(S): 2 INJECTION INTRAMUSCULAR; INTRAVENOUS; SUBCUTANEOUS at 10:52

## 2018-12-05 RX ADMIN — Medication 100 MILLIGRAM(S): at 05:51

## 2018-12-05 RX ADMIN — Medication 100 MILLIGRAM(S): at 13:59

## 2018-12-05 RX ADMIN — Medication 400 MILLIGRAM(S): at 14:01

## 2018-12-05 RX ADMIN — CINACALCET 30 MILLIGRAM(S): 30 TABLET, FILM COATED ORAL at 11:27

## 2018-12-05 RX ADMIN — Medication 1: at 17:15

## 2018-12-05 RX ADMIN — HYDROMORPHONE HYDROCHLORIDE 1 MILLIGRAM(S): 2 INJECTION INTRAMUSCULAR; INTRAVENOUS; SUBCUTANEOUS at 11:15

## 2018-12-05 RX ADMIN — Medication 1000 MILLIGRAM(S): at 14:15

## 2018-12-05 RX ADMIN — GABAPENTIN 400 MILLIGRAM(S): 400 CAPSULE ORAL at 17:19

## 2018-12-05 RX ADMIN — HYDROMORPHONE HYDROCHLORIDE 1 MILLIGRAM(S): 2 INJECTION INTRAMUSCULAR; INTRAVENOUS; SUBCUTANEOUS at 05:37

## 2018-12-05 RX ADMIN — QUETIAPINE FUMARATE 200 MILLIGRAM(S): 200 TABLET, FILM COATED ORAL at 11:27

## 2018-12-05 RX ADMIN — SEVELAMER CARBONATE 1600 MILLIGRAM(S): 2400 POWDER, FOR SUSPENSION ORAL at 05:51

## 2018-12-05 RX ADMIN — HYDROMORPHONE HYDROCHLORIDE 1 MILLIGRAM(S): 2 INJECTION INTRAMUSCULAR; INTRAVENOUS; SUBCUTANEOUS at 22:00

## 2018-12-05 RX ADMIN — HYDROMORPHONE HYDROCHLORIDE 1 MILLIGRAM(S): 2 INJECTION INTRAMUSCULAR; INTRAVENOUS; SUBCUTANEOUS at 04:46

## 2018-12-05 RX ADMIN — Medication 5 UNIT(S): at 12:29

## 2018-12-05 RX ADMIN — Medication 100 MILLIGRAM(S): at 22:02

## 2018-12-05 RX ADMIN — ESCITALOPRAM OXALATE 10 MILLIGRAM(S): 10 TABLET, FILM COATED ORAL at 11:27

## 2018-12-05 RX ADMIN — POLYETHYLENE GLYCOL 3350 17 GRAM(S): 17 POWDER, FOR SOLUTION ORAL at 17:19

## 2018-12-05 RX ADMIN — Medication 5 UNIT(S): at 17:16

## 2018-12-05 RX ADMIN — HYDROMORPHONE HYDROCHLORIDE 1 MILLIGRAM(S): 2 INJECTION INTRAMUSCULAR; INTRAVENOUS; SUBCUTANEOUS at 23:06

## 2018-12-05 RX ADMIN — HEPARIN SODIUM 5000 UNIT(S): 5000 INJECTION INTRAVENOUS; SUBCUTANEOUS at 05:51

## 2018-12-05 RX ADMIN — SEVELAMER CARBONATE 1600 MILLIGRAM(S): 2400 POWDER, FOR SUSPENSION ORAL at 22:01

## 2018-12-05 RX ADMIN — MIRTAZAPINE 45 MILLIGRAM(S): 45 TABLET, ORALLY DISINTEGRATING ORAL at 11:27

## 2018-12-05 RX ADMIN — SEVELAMER CARBONATE 1600 MILLIGRAM(S): 2400 POWDER, FOR SUSPENSION ORAL at 13:59

## 2018-12-05 RX ADMIN — GABAPENTIN 400 MILLIGRAM(S): 400 CAPSULE ORAL at 05:51

## 2018-12-05 RX ADMIN — SIMVASTATIN 40 MILLIGRAM(S): 20 TABLET, FILM COATED ORAL at 22:01

## 2018-12-05 RX ADMIN — INSULIN GLARGINE 15 UNIT(S): 100 INJECTION, SOLUTION SUBCUTANEOUS at 22:02

## 2018-12-05 NOTE — PROGRESS NOTE ADULT - ASSESSMENT
Patient is a 39 y/o M from Home with History of ESRD on HD TTS via left arm AV fistula/graft x 2 years, HTN, HLD, DM, chronic back pain presented with complaint of right leg swelling and redness. Patient was admitted to [facility] and discharged on friday for missing dialysis. He then had a scrap on his leg and then developed redness and swelling at the site. admitted for Cellulitis of Right LE. on IV abx.  MRI today to rule out abcess

## 2018-12-05 NOTE — PROGRESS NOTE ADULT - ASSESSMENT
sen and examined vsstable afebrile physical unchanged   very comfortable on bed  doesnt appear in any pain   on asking how you feeling  answers i have alot of pain in rt leg     rt leg still has still has mild redness and tenderness in lower and outer side but much better    mri leg nml for any abcess. on iv antibxs  ID to f/u  d/c planning if ID agree.  yesterday , i had doubt about fluctuation , i ordered mri leg  is ok    no fever, wbc nml  d/c planning

## 2018-12-05 NOTE — DIETITIAN INITIAL EVALUATION ADULT. - OTHER INFO
Pt visited. Pt C/O sleepy. Pt seen for LOS= 7 days. Pt reports Good appetite.. H/O Dm x ~ 2 1/2 years, And On HD x 1 1/2 years.  Food choices obtained. Per Pt his mother cooks for him at Home..

## 2018-12-05 NOTE — DIETITIAN INITIAL EVALUATION ADULT. - PROBLEM SELECTOR PLAN 2
T-T-S , missed dialysis today   Elevated BUN/ Creatinine , but no criteria for urgent dialysis  No signs of overload   Will obtain CXR  HD in am   Nephro Dr. La  c/w shanice lopez

## 2018-12-05 NOTE — DIETITIAN INITIAL EVALUATION ADULT. - PROBLEM SELECTOR PLAN 6
c/w lower doses of Lantus , humalog , resume full doses in the course of admission  c/w hss   f/u a1c

## 2018-12-05 NOTE — PROGRESS NOTE ADULT - PROBLEM SELECTOR PLAN 1
- iv acetaminophen for 4 doses  - no nsaids - pt on hd but still urinating  - gabapentin 400mg po q 12 hours   - hydromorphone 1mg ivp q 6 hours prn - will not change frequency at this time  - continue antibiotics as ordered  - will start transitioning pt tomorrow  - miralax daily - hold for loose stool

## 2018-12-05 NOTE — PROGRESS NOTE ADULT - SUBJECTIVE AND OBJECTIVE BOX
HPI:  Patient is a 37 y/o M from Home with History of ESRD on HD TTS via left arm AV fistula/graft x 2 years, HTN, HLD, DM, chronic back pain presented with complaint of right leg swelling and redness. Patient was admitted to [facility] and discharged on friday for missing dialysis. He then had a scrap on his leg and then developed redness and swelling at the site. Patient also complaints of chills , nausea , NBNP vomiting. No fever, no chest pain , no sob , no dizziness , no headache.     In Ed , BP: 186/110 mm hg , HR: 71 , Temp: 98.7 F  Cbc shows elevated white count with left shift  Bmp with elevated BUN/ Creatinine   Lactate WNL. (29 Nov 2018 18:21)      Patient is a 38y old  Male who presents with a chief complaint of right leg swelling , redness (05 Dec 2018 13:53)      INTERVAL HPI/OVERNIGHT EVENTS:  T(C): 36.6 (12-05-18 @ 12:32), Max: 37 (12-04-18 @ 20:50)  HR: 81 (12-05-18 @ 12:32) (80 - 83)  BP: 141/75 (12-05-18 @ 12:32) (141/75 - 147/78)  RR: 16 (12-05-18 @ 12:32) (16 - 20)  SpO2: 96% (12-05-18 @ 12:32) (96% - 97%)  Wt(kg): --  I&O's Summary      REVIEW OF SYSTEMS: denies fever, chills, SOB, palpitations, chest pain, abdominal pain, nausea, vomitting, diarrhea, constipation, dizziness    MEDICATIONS  (STANDING):  acetaminophen  IVPB .. 1000 milliGRAM(s) IV Intermittent once  amLODIPine   Tablet 2.5 milliGRAM(s) Oral at bedtime  cinacalcet 30 milliGRAM(s) Oral daily  clindamycin IVPB 600 milliGRAM(s) IV Intermittent every 8 hours  epoetin cyndie Injectable 2000 Unit(s) IV Push <User Schedule>  escitalopram 10 milliGRAM(s) Oral daily  gabapentin 400 milliGRAM(s) Oral two times a day  heparin  Injectable 5000 Unit(s) SubCutaneous every 8 hours  influenza   Vaccine 0.5 milliLiter(s) IntraMuscular once  insulin glargine Injectable (LANTUS) 15 Unit(s) SubCutaneous at bedtime  insulin lispro (HumaLOG) corrective regimen sliding scale   SubCutaneous Before meals and at bedtime  insulin lispro Injectable (HumaLOG) 5 Unit(s) SubCutaneous three times a day before meals  mirtazapine 45 milliGRAM(s) Oral daily  pantoprazole    Tablet 40 milliGRAM(s) Oral before breakfast  polyethylene glycol 3350 17 Gram(s) Oral daily  QUEtiapine 200 milliGRAM(s) Oral daily  sevelamer hydrochloride 1600 milliGRAM(s) Oral every 8 hours  simvastatin 40 milliGRAM(s) Oral at bedtime    MEDICATIONS  (PRN):  acetaminophen   Tablet .. 650 milliGRAM(s) Oral every 6 hours PRN Temp greater or equal to 38C (100.4F), Mild Pain (1 - 3)  HYDROmorphone  Injectable 1 milliGRAM(s) IV Push every 6 hours PRN Severe Pain (7 - 10)  oxyCODONE    IR 5 milliGRAM(s) Oral every 6 hours PRN Moderate Pain (4 - 6)      PHYSICAL EXAM:  GENERAL: NAD, well-groomed, well-developed  HEAD:  Atraumatic, Normocephalic  EYES: EOMI, PERRLA, conjunctiva and sclera clear  ENMT: No tonsillar erythema, exudates, or enlargement; Moist mucous membranes, Good dentition, No lesions  NECK: Supple, No JVD, Normal thyroid  NERVOUS SYSTEM:  Alert & Oriented X3, Good concentration; Motor Strength 5/5 B/L upper and lower extremities; DTRs 2+ intact and symmetric  CHEST/LUNG: Clear to percussion bilaterally; No rales, rhonchi, wheezing, or rubs  HEART: Regular rate and rhythm; No murmurs, rubs, or gallops  ABDOMEN: Soft, Nontender, Nondistended; Bowel sounds present  EXTREMITIES:  2+ Peripheral Pulses, No clubbing, cyanosis, or edema  LYMPH: No lymphadenopathy noted  SKIN: No rashes or lesions  LABS:                        10.0   9.2   )-----------( 358      ( 05 Dec 2018 07:01 )             30.5     12-05    133<L>  |  96  |  46<H>  ----------------------------<  150<H>  4.0   |  24  |  9.06<H>    Ca    9.5      05 Dec 2018 07:01          CAPILLARY BLOOD GLUCOSE      POCT Blood Glucose.: 112 mg/dL (05 Dec 2018 11:33)  POCT Blood Glucose.: 117 mg/dL (05 Dec 2018 08:14)  POCT Blood Glucose.: 103 mg/dL (04 Dec 2018 22:42)  POCT Blood Glucose.: 124 mg/dL (04 Dec 2018 16:55)

## 2018-12-05 NOTE — PROGRESS NOTE ADULT - PROBLEM SELECTOR PLAN 1
-Patient admitted for right LE pain , swelling , redness ; with open ulcer.   - cont Clindamycin  as per ID  -f/u blood cultures, NTD   -podiatry following   -Pain mgmt  -Elevate Extremity  -MRI RLE today to rule out abscess -Patient admitted for right LE pain , improving swelling and redness   - cont Clindamycin  as per ID  -f/u blood cultures, NTD   -podiatry following   -Pain mgmt  -Elevate Extremity  -MRI RLE today to rule out abscess

## 2018-12-05 NOTE — DIETITIAN INITIAL EVALUATION ADULT. - PROBLEM SELECTOR PLAN 3
Corrected calcium level is 12.6   s/p 250 cc bolus in Ed   Would avoid further dose of fluid given no dialysis today   Would give one dose of sensipar 60 mg  c/w home dose of sensipar from morning   HD in am   c/w home sensipar

## 2018-12-05 NOTE — PROGRESS NOTE ADULT - SUBJECTIVE AND OBJECTIVE BOX
MRI RT leg noted with no abcess or osteomyelitis    aspirin (Swelling)  penicillins (Swelling)  shellfish (Unknown)    Hospital Medications:   MEDICATIONS  (STANDING):  acetaminophen  IVPB .. 1000 milliGRAM(s) IV Intermittent once  acetaminophen  IVPB .. 1000 milliGRAM(s) IV Intermittent once  amLODIPine   Tablet 2.5 milliGRAM(s) Oral at bedtime  cinacalcet 30 milliGRAM(s) Oral daily  clindamycin IVPB 600 milliGRAM(s) IV Intermittent every 8 hours  epoetin cyndie Injectable 2000 Unit(s) IV Push <User Schedule>  escitalopram 10 milliGRAM(s) Oral daily  gabapentin 400 milliGRAM(s) Oral two times a day  heparin  Injectable 5000 Unit(s) SubCutaneous every 8 hours  influenza   Vaccine 0.5 milliLiter(s) IntraMuscular once  insulin glargine Injectable (LANTUS) 15 Unit(s) SubCutaneous at bedtime  insulin lispro (HumaLOG) corrective regimen sliding scale   SubCutaneous Before meals and at bedtime  insulin lispro Injectable (HumaLOG) 5 Unit(s) SubCutaneous three times a day before meals  mirtazapine 45 milliGRAM(s) Oral daily  pantoprazole    Tablet 40 milliGRAM(s) Oral before breakfast  polyethylene glycol 3350 17 Gram(s) Oral daily  QUEtiapine 200 milliGRAM(s) Oral daily  sevelamer hydrochloride 1600 milliGRAM(s) Oral every 8 hours  simvastatin 40 milliGRAM(s) Oral at bedtime        VITALS:  T(F): 97.8 (12-05-18 @ 12:32), Max: 98.6 (12-04-18 @ 20:50)  HR: 81 (12-05-18 @ 12:32)  BP: 141/75 (12-05-18 @ 12:32)  RR: 16 (12-05-18 @ 12:32)  SpO2: 96% (12-05-18 @ 12:32)  Wt(kg): --      PHYSICAL EXAM:  Constitutional: NAD  HEENT: anicteric sclera, oropharynx clear.  Neck: No JVD  Respiratory: CTAB, no wheezes, rales or rhonchi  Cardiovascular: S1, S2, RRR  Gastrointestinal: BS+, soft, NT/ND  Extremities: No peripheral edema. RT leg swollen, tender.  Neurological: A/O x 3, no focal deficits  Psychiatric: Normal mood, normal affect  : No CVA tenderness. No hernandez.   Vascular Access: AVF with thrill and bruit    LABS:  12-05    133<L>  |  96  |  46<H>  ----------------------------<  150<H>  4.0   |  24  |  9.06<H>    Ca    9.5      05 Dec 2018 07:01      Creatinine Trend: 9.06 <--, 11.60 <--, 10.00 <--, 8.24 <--, 10.60 <--, 13.20 <--, 13.20 <--                        10.0   9.2   )-----------( 358      ( 05 Dec 2018 07:01 )             30.5     Urine Studies:      RADIOLOGY & ADDITIONAL STUDIES:

## 2018-12-05 NOTE — DIETITIAN INITIAL EVALUATION ADULT. - PROBLEM SELECTOR PLAN 1
Patient comes in with right LE pain , swelling , redness ; with open ulcer and reports discharge from it  Would start with Clindamycin for now , given missed dialysis today   could switch to vancomycin if spikes fever or any worsening signs of infection  f/u blood cultures   Consult podiatry   Pain mx  Elevate Extremity   Ultrasound doppler to r/o DVT

## 2018-12-05 NOTE — PROGRESS NOTE ADULT - SUBJECTIVE AND OBJECTIVE BOX
NP Note discussed with  Primary Attending    Patient is a 38y old  Male who presents with a chief complaint of right leg swelling , redness (05 Dec 2018 11:08).  Pt currently being treated for cellulitis of right leg.  MRI done which shows no OM. + history of htn, dm and pt on Dialysis for a little more than a year.  Pt currently on clindamycin for cellulitis.  No nausea or vomiting.        INTERVAL HPI/OVERNIGHT EVENTS: no new complaints    MEDICATIONS  (STANDING):  acetaminophen  IVPB .. 1000 milliGRAM(s) IV Intermittent once  acetaminophen  IVPB .. 1000 milliGRAM(s) IV Intermittent once  amLODIPine   Tablet 2.5 milliGRAM(s) Oral at bedtime  cinacalcet 30 milliGRAM(s) Oral daily  clindamycin IVPB 600 milliGRAM(s) IV Intermittent every 8 hours  epoetin cyndie Injectable 2000 Unit(s) IV Push <User Schedule>  escitalopram 10 milliGRAM(s) Oral daily  gabapentin 400 milliGRAM(s) Oral two times a day  heparin  Injectable 5000 Unit(s) SubCutaneous every 8 hours  influenza   Vaccine 0.5 milliLiter(s) IntraMuscular once  insulin glargine Injectable (LANTUS) 15 Unit(s) SubCutaneous at bedtime  insulin lispro (HumaLOG) corrective regimen sliding scale   SubCutaneous Before meals and at bedtime  insulin lispro Injectable (HumaLOG) 5 Unit(s) SubCutaneous three times a day before meals  mirtazapine 45 milliGRAM(s) Oral daily  pantoprazole    Tablet 40 milliGRAM(s) Oral before breakfast  QUEtiapine 200 milliGRAM(s) Oral daily  sevelamer hydrochloride 1600 milliGRAM(s) Oral every 8 hours  simvastatin 40 milliGRAM(s) Oral at bedtime    MEDICATIONS  (PRN):  acetaminophen   Tablet .. 650 milliGRAM(s) Oral every 6 hours PRN Temp greater or equal to 38C (100.4F), Mild Pain (1 - 3)  HYDROmorphone  Injectable 1 milliGRAM(s) IV Push every 6 hours PRN Severe Pain (7 - 10)  oxyCODONE    IR 5 milliGRAM(s) Oral every 6 hours PRN Moderate Pain (4 - 6)      __________________________________________________  REVIEW OF SYSTEMS:    CONSTITUTIONAL: No fever,   RESPIRATORY: No cough; No shortness of breath  CARDIOVASCULAR: No chest pain, no palpitations  GASTROINTESTINAL: No pain. No nausea or vomiting; No diarrhea   NEUROLOGICAL: No headache or numbness, no tremors  MUSCULOSKELETAL: + right leg pain  GENITOURINARY: no dysuria, no frequency, no hesitancy + crf - on hd, but pt still urinating         Vital Signs Last 24 Hrs  T(C): 36.6 (05 Dec 2018 12:32), Max: 37 (04 Dec 2018 20:50)  T(F): 97.8 (05 Dec 2018 12:32), Max: 98.6 (04 Dec 2018 20:50)  HR: 81 (05 Dec 2018 12:32) (80 - 83)  BP: 141/75 (05 Dec 2018 12:32) (141/75 - 147/78)  BP(mean): 96 (05 Dec 2018 04:42) (96 - 96)  RR: 16 (05 Dec 2018 12:32) (16 - 20)  SpO2: 96% (05 Dec 2018 12:32) (96% - 97%)    ________________________________________________  PHYSICAL EXAM:  GENERAL: NAD  CHEST/LUNG: decreased breath sounds  HEART: S1 S2  regular; no murmurs, gallops or rubs  ABDOMEN: Soft, Nontender, Nondistended; Bowel sounds present  EXTREMITIES: + right leg - + erythema, + mild edema, + tenderness  NERVOUS SYSTEM:  Awake and alert; Oriented  to place, person and time  MUSCULOSKELETAL: + decreased rom   _________________________________________________  LABS:                        10.0   9.2   )-----------( 358      ( 05 Dec 2018 07:01 )             30.5     12-05    133<L>  |  96  |  46<H>  ----------------------------<  150<H>  4.0   |  24  |  9.06<H>    Ca    9.5      05 Dec 2018 07:01          CAPILLARY BLOOD GLUCOSE      POCT Blood Glucose.: 112 mg/dL (05 Dec 2018 11:33)  POCT Blood Glucose.: 117 mg/dL (05 Dec 2018 08:14)  POCT Blood Glucose.: 103 mg/dL (04 Dec 2018 22:42)  POCT Blood Glucose.: 124 mg/dL (04 Dec 2018 16:55)        RADIOLOGY & ADDITIONAL TESTS:    Imaging Personally Reviewed:  YES/NO    Consultant(s) Notes Reviewed:   YES/ No    Care Discussed with Consultants :     Plan of care was discussed with patient and /or primary care giver; all questions and concerns were addressed and care was aligned with patient's wishes. NP Note discussed with  Primary Attending    Patient is a 38y old  Male who presents with a chief complaint of right leg swelling , redness (05 Dec 2018 11:08).  Pt currently being treated for cellulitis of right leg.  MRI done which shows no OM. + history of htn, dm and pt on Dialysis for a little more than a year.  Pt currently on clindamycin for cellulitis.  No nausea or vomiting.        INTERVAL HPI/OVERNIGHT EVENTS: no new complaints    MEDICATIONS  (STANDING):  acetaminophen  IVPB .. 1000 milliGRAM(s) IV Intermittent once  acetaminophen  IVPB .. 1000 milliGRAM(s) IV Intermittent once  amLODIPine   Tablet 2.5 milliGRAM(s) Oral at bedtime  cinacalcet 30 milliGRAM(s) Oral daily  clindamycin IVPB 600 milliGRAM(s) IV Intermittent every 8 hours  epoetin cyndie Injectable 2000 Unit(s) IV Push <User Schedule>  escitalopram 10 milliGRAM(s) Oral daily  gabapentin 400 milliGRAM(s) Oral two times a day  heparin  Injectable 5000 Unit(s) SubCutaneous every 8 hours  influenza   Vaccine 0.5 milliLiter(s) IntraMuscular once  insulin glargine Injectable (LANTUS) 15 Unit(s) SubCutaneous at bedtime  insulin lispro (HumaLOG) corrective regimen sliding scale   SubCutaneous Before meals and at bedtime  insulin lispro Injectable (HumaLOG) 5 Unit(s) SubCutaneous three times a day before meals  mirtazapine 45 milliGRAM(s) Oral daily  pantoprazole    Tablet 40 milliGRAM(s) Oral before breakfast  QUEtiapine 200 milliGRAM(s) Oral daily  sevelamer hydrochloride 1600 milliGRAM(s) Oral every 8 hours  simvastatin 40 milliGRAM(s) Oral at bedtime    MEDICATIONS  (PRN):  acetaminophen   Tablet .. 650 milliGRAM(s) Oral every 6 hours PRN Temp greater or equal to 38C (100.4F), Mild Pain (1 - 3)  HYDROmorphone  Injectable 1 milliGRAM(s) IV Push every 6 hours PRN Severe Pain (7 - 10)  oxyCODONE    IR 5 milliGRAM(s) Oral every 6 hours PRN Moderate Pain (4 - 6)      __________________________________________________  REVIEW OF SYSTEMS:    CONSTITUTIONAL: No fever,   RESPIRATORY: No cough; No shortness of breath  CARDIOVASCULAR: No chest pain, no palpitations  GASTROINTESTINAL: No pain. No nausea or vomiting; No diarrhea   NEUROLOGICAL: No headache or numbness, no tremors  MUSCULOSKELETAL: + right leg pain  GENITOURINARY: no dysuria, no frequency, no hesitancy + crf - on hd, but pt still urinating         Vital Signs Last 24 Hrs  T(C): 36.6 (05 Dec 2018 12:32), Max: 37 (04 Dec 2018 20:50)  T(F): 97.8 (05 Dec 2018 12:32), Max: 98.6 (04 Dec 2018 20:50)  HR: 81 (05 Dec 2018 12:32) (80 - 83)  BP: 141/75 (05 Dec 2018 12:32) (141/75 - 147/78)  BP(mean): 96 (05 Dec 2018 04:42) (96 - 96)  RR: 16 (05 Dec 2018 12:32) (16 - 20)  SpO2: 96% (05 Dec 2018 12:32) (96% - 97%)    ________________________________________________  PHYSICAL EXAM:  GENERAL: NAD  CHEST/LUNG: decreased breath sounds  HEART: S1 S2  regular; no murmurs, gallops or rubs  ABDOMEN: Soft, Nontender, Nondistended; Bowel sounds present  EXTREMITIES: + right leg - + erythema, + mild edema, + tenderness  NERVOUS SYSTEM:  Awake and alert; Oriented  to place, person and time  MUSCULOSKELETAL: + decreased rom   _________________________________________________  LABS:                        10.0   9.2   )-----------( 358      ( 05 Dec 2018 07:01 )             30.5     12-05    133<L>  |  96  |  46<H>  ----------------------------<  150<H>  4.0   |  24  |  9.06<H>    Ca    9.5      05 Dec 2018 07:01          CAPILLARY BLOOD GLUCOSE      POCT Blood Glucose.: 112 mg/dL (05 Dec 2018 11:33)  POCT Blood Glucose.: 117 mg/dL (05 Dec 2018 08:14)  POCT Blood Glucose.: 103 mg/dL (04 Dec 2018 22:42)  POCT Blood Glucose.: 124 mg/dL (04 Dec 2018 16:55)        RADIOLOGY & ADDITIONAL TESTS:  < from: MR Lower Ext Non-joint No Cont, Right (12.05.18 @ 10:04) >  EXAM:  MR LWR EXT NON JOINT RT                            PROCEDURE DATE:  12/05/2018          INTERPRETATION:  EXAMINATION: MRI of the right lower extremity without   contrast    CLINICAL INFORMATION: Right lower extremity cellulitis.    TECHNIQUE: Multiplanar, multisequential MR imaging was performed. Imaging   was performed through the tibia and fibula    FINDINGS: There is skin thickening and subcutaneous fat infiltration,   most pronounced anteriorly, consistent with the patient's history of   cellulitis. No well-formed collections are demonstrated. There are no   areas of marrow signal alteration which are suspicious for osteomyelitis.   There is no muscle atrophy or edema. There are no muscle or tendon tears.    IMPRESSION: Anterior cellulitic change. No abscess or osteomyelitis.    < end of copied text >      Imaging Personally Reviewed:  YES/NO    Consultant(s) Notes Reviewed:   YES/ No    Care Discussed with Consultants :     Plan of care was discussed with patient and /or primary care giver; all questions and concerns were addressed and care was aligned with patient's wishes.

## 2018-12-05 NOTE — PROGRESS NOTE ADULT - SUBJECTIVE AND OBJECTIVE BOX
Patient is a 38y old  Male who presents with a chief complaint of right leg swelling , redness (04 Dec 2018 13:13)      INTERVAL HPI/OVERNIGHT EVENTS:  persistent LE pain, LE redness improving.        REVIEW OF SYSTEMS:  CONSTITUTIONAL: No fever, chills  ENMT:  No difficulty hearing, no change in vision  NECK: No pain or stiffness  RESPIRATORY: No cough, SOB  CARDIOVASCULAR: No chest pain, palpitations  GASTROINTESTINAL: No abdominal pain. No nausea, vomiting, or diarrhea  GENITOURINARY: No dysuria  NEUROLOGICAL: No HA  MUSCULOSKELETAL: +ve right lower extremity pain    T(C): 37 (12-04-18 @ 20:50), Max: 37 (12-04-18 @ 20:50)  HR: 80 (12-05-18 @ 04:42) (72 - 83)  BP: 141/87 (12-05-18 @ 04:42) (141/81 - 147/78)  RR: 20 (12-04-18 @ 20:50) (18 - 20)  SpO2: 97% (12-04-18 @ 20:50) (97% - 100%)  Wt(kg): --Vital Signs Last 24 Hrs  T(C): 37 (04 Dec 2018 20:50), Max: 37 (04 Dec 2018 20:50)  T(F): 98.6 (04 Dec 2018 20:50), Max: 98.6 (04 Dec 2018 20:50)  HR: 80 (05 Dec 2018 04:42) (72 - 83)  BP: 141/87 (05 Dec 2018 04:42) (141/81 - 147/78)  BP(mean): 96 (05 Dec 2018 04:42) (96 - 96)  RR: 20 (04 Dec 2018 20:50) (18 - 20)  SpO2: 97% (04 Dec 2018 20:50) (97% - 100%)    MEDICATIONS  (STANDING):  amLODIPine   Tablet 2.5 milliGRAM(s) Oral at bedtime  cinacalcet 30 milliGRAM(s) Oral daily  clindamycin IVPB 600 milliGRAM(s) IV Intermittent every 8 hours  epoetin cyndie Injectable 2000 Unit(s) IV Push <User Schedule>  escitalopram 10 milliGRAM(s) Oral daily  gabapentin 400 milliGRAM(s) Oral two times a day  heparin  Injectable 5000 Unit(s) SubCutaneous every 8 hours  influenza   Vaccine 0.5 milliLiter(s) IntraMuscular once  insulin glargine Injectable (LANTUS) 15 Unit(s) SubCutaneous at bedtime  insulin lispro (HumaLOG) corrective regimen sliding scale   SubCutaneous Before meals and at bedtime  insulin lispro Injectable (HumaLOG) 5 Unit(s) SubCutaneous three times a day before meals  mirtazapine 45 milliGRAM(s) Oral daily  pantoprazole    Tablet 40 milliGRAM(s) Oral before breakfast  QUEtiapine 200 milliGRAM(s) Oral daily  sevelamer hydrochloride 1600 milliGRAM(s) Oral every 8 hours  simvastatin 40 milliGRAM(s) Oral at bedtime    MEDICATIONS  (PRN):  acetaminophen   Tablet .. 650 milliGRAM(s) Oral every 6 hours PRN Temp greater or equal to 38C (100.4F), Mild Pain (1 - 3)  HYDROmorphone  Injectable 1 milliGRAM(s) IV Push every 6 hours PRN Severe Pain (7 - 10)  oxyCODONE    IR 5 milliGRAM(s) Oral every 6 hours PRN Moderate Pain (4 - 6)    PHYSICAL EXAM:  GENERAL: NAD   EYES: clear conjunctiva   ENMT: Moist mucous membranes  NECK: Supple, No JVD   CHEST/LUNG: Clear to auscultation bilaterally; No rales, rhonchi, wheezing, or rubs  HEART: S1, S2, Regular rate and rhythm  ABDOMEN: Soft, Nontender, Nondistended; Bowel sounds present  NEURO: Alert & Oriented X3  EXTREMITIES: +ve RLE pain   SKIN: RLE redness and tenderness     Consultant(s) Notes Reviewed:  [x ] YES  [ ] NO  Care Discussed with Consultants/Other Providers [ x] YES  [ ] NO    LABS:                        10.0   9.2   )-----------( 358      ( 05 Dec 2018 07:01 )             30.5     12-05    133<L>  |  96  |  46<H>  ----------------------------<  150<H>  4.0   |  24  |  9.06<H>    Ca    9.5      05 Dec 2018 07:01          CAPILLARY BLOOD GLUCOSE      POCT Blood Glucose.: 117 mg/dL (05 Dec 2018 08:14)  POCT Blood Glucose.: 103 mg/dL (04 Dec 2018 22:42)  POCT Blood Glucose.: 124 mg/dL (04 Dec 2018 16:55)  POCT Blood Glucose.: 111 mg/dL (04 Dec 2018 12:10)            RADIOLOGY & ADDITIONAL TESTS:    Imaging Personally Reviewed:  [ ] YES  [ ] NO Patient is a 38y old  Male who presents with a chief complaint of right leg swelling , redness (04 Dec 2018 13:13)      INTERVAL HPI/OVERNIGHT EVENTS:  persistent LE pain, LE redness improving.        REVIEW OF SYSTEMS:  CONSTITUTIONAL: No fever, chills  ENMT:  No difficulty hearing, no change in vision  NECK: No pain or stiffness  RESPIRATORY: No cough, SOB  CARDIOVASCULAR: No chest pain, palpitations  GASTROINTESTINAL: No abdominal pain. No nausea, vomiting, or diarrhea  GENITOURINARY: No dysuria  NEUROLOGICAL: No HA  MUSCULOSKELETAL: +ve right lower extremity pain    T(C): 37 (12-04-18 @ 20:50), Max: 37 (12-04-18 @ 20:50)  HR: 80 (12-05-18 @ 04:42) (72 - 83)  BP: 141/87 (12-05-18 @ 04:42) (141/81 - 147/78)  RR: 20 (12-04-18 @ 20:50) (18 - 20)  SpO2: 97% (12-04-18 @ 20:50) (97% - 100%)  Wt(kg): --Vital Signs Last 24 Hrs  T(C): 37 (04 Dec 2018 20:50), Max: 37 (04 Dec 2018 20:50)  T(F): 98.6 (04 Dec 2018 20:50), Max: 98.6 (04 Dec 2018 20:50)  HR: 80 (05 Dec 2018 04:42) (72 - 83)  BP: 141/87 (05 Dec 2018 04:42) (141/81 - 147/78)  BP(mean): 96 (05 Dec 2018 04:42) (96 - 96)  RR: 20 (04 Dec 2018 20:50) (18 - 20)  SpO2: 97% (04 Dec 2018 20:50) (97% - 100%)    MEDICATIONS  (STANDING):  amLODIPine   Tablet 2.5 milliGRAM(s) Oral at bedtime  cinacalcet 30 milliGRAM(s) Oral daily  clindamycin IVPB 600 milliGRAM(s) IV Intermittent every 8 hours  epoetin cyndie Injectable 2000 Unit(s) IV Push <User Schedule>  escitalopram 10 milliGRAM(s) Oral daily  gabapentin 400 milliGRAM(s) Oral two times a day  heparin  Injectable 5000 Unit(s) SubCutaneous every 8 hours  influenza   Vaccine 0.5 milliLiter(s) IntraMuscular once  insulin glargine Injectable (LANTUS) 15 Unit(s) SubCutaneous at bedtime  insulin lispro (HumaLOG) corrective regimen sliding scale   SubCutaneous Before meals and at bedtime  insulin lispro Injectable (HumaLOG) 5 Unit(s) SubCutaneous three times a day before meals  mirtazapine 45 milliGRAM(s) Oral daily  pantoprazole    Tablet 40 milliGRAM(s) Oral before breakfast  QUEtiapine 200 milliGRAM(s) Oral daily  sevelamer hydrochloride 1600 milliGRAM(s) Oral every 8 hours  simvastatin 40 milliGRAM(s) Oral at bedtime    MEDICATIONS  (PRN):  acetaminophen   Tablet .. 650 milliGRAM(s) Oral every 6 hours PRN Temp greater or equal to 38C (100.4F), Mild Pain (1 - 3)  HYDROmorphone  Injectable 1 milliGRAM(s) IV Push every 6 hours PRN Severe Pain (7 - 10)  oxyCODONE    IR 5 milliGRAM(s) Oral every 6 hours PRN Moderate Pain (4 - 6)    PHYSICAL EXAM:  GENERAL: NAD   EYES: clear conjunctiva   ENMT: Moist mucous membranes  NECK: Supple, No JVD   CHEST/LUNG: Clear to auscultation bilaterally; No rales, rhonchi, wheezing, or rubs  HEART: S1, S2, Regular rate and rhythm  ABDOMEN: Soft, Nontender, Nondistended; Bowel sounds present  NEURO: Alert & Oriented X3  EXTREMITIES: +ve RLE pain   SKIN: RLE redness and tenderness     Consultant(s) Notes Reviewed:  [x ] YES  [ ] NO  Care Discussed with Consultants/Other Providers [ x] YES  [ ] NO    LABS:                        10.0   9.2   )-----------( 358      ( 05 Dec 2018 07:01 )             30.5     12-05    133<L>  |  96  |  46<H>  ----------------------------<  150<H>  4.0   |  24  |  9.06<H>    Ca    9.5      05 Dec 2018 07:01          CAPILLARY BLOOD GLUCOSE      POCT Blood Glucose.: 117 mg/dL (05 Dec 2018 08:14)  POCT Blood Glucose.: 103 mg/dL (04 Dec 2018 22:42)  POCT Blood Glucose.: 124 mg/dL (04 Dec 2018 16:55)  POCT Blood Glucose.: 111 mg/dL (04 Dec 2018 12:10)            RADIOLOGY & ADDITIONAL TESTS:    MRI pending today     Imaging Personally Reviewed:  [ ] YES  [ ] NO

## 2018-12-05 NOTE — PROGRESS NOTE ADULT - ASSESSMENT
1.	Right leg cellulitis - improving  ·	cont clindamycin 600mgs iv q8hrs D7 1.	Right leg cellulitis - improving  ·	cont clindamycin 600mgs iv q8hrs D7  ·	can dc home tomorrow on vancomycin 1500mgs iv 3 x week(at dialysis) x total of 3 doses.

## 2018-12-05 NOTE — PROGRESS NOTE ADULT - ASSESSMENT
38 yr old male on with ESRD on HD TTS at [facility]. Very non-compliant with dialysis schedule and misses dialysis very frequently.  [place] dialysis for a few days and RT leg cellulitis.    # ESRD- HD in AM  # HTN controlled.   #RLE cellulitis. cont clindamycin. No abcess or osteomyelitis  #CKDMBD cont sensipar, renvela  # anemia of CKD cont procrit on HD   # DM2 cont lantus

## 2018-12-05 NOTE — PROGRESS NOTE ADULT - SUBJECTIVE AND OBJECTIVE BOX
38y Male is under our care for     REVIEW OF SYSTEMS:  [  ] Not able to illicit  General:	  Chest:	  GI:	  :  Skin:	  Musculoskeletal:	  Neuro:	    MEDS:  clindamycin IVPB 600 milliGRAM(s) IV Intermittent every 8 hours    ALLERGIES: Allergies    aspirin (Swelling)  penicillins (Swelling)  shellfish (Unknown)    Intolerances        VITALS:  Vital Signs Last 24 Hrs  T(C): 36.6 (05 Dec 2018 12:32), Max: 37 (04 Dec 2018 20:50)  T(F): 97.8 (05 Dec 2018 12:32), Max: 98.6 (04 Dec 2018 20:50)  HR: 81 (05 Dec 2018 12:32) (80 - 83)  BP: 141/75 (05 Dec 2018 12:32) (141/75 - 141/87)  BP(mean): 96 (05 Dec 2018 04:42) (96 - 96)  RR: 16 (05 Dec 2018 12:32) (16 - 20)  SpO2: 96% (05 Dec 2018 12:32) (96% - 97%)      PHYSICAL EXAM:  HEENT:  Neck:  Respiratory:  Cardiovascular:  Gastrointestinal:  Extremities:  Skin:  Ortho:  Neuro:    LABS/DIAGNOSTIC TESTS:                        10.0   9.2   )-----------( 358      ( 05 Dec 2018 07:01 )             30.5     WBC Count: 9.2 K/uL (12-05 @ 07:01)  WBC Count: 10.5 K/uL (12-04 @ 10:35)  WBC Count: 9.2 K/uL (12-03 @ 06:27)  WBC Count: 8.4 K/uL (12-02 @ 08:18)  WBC Count: 8.9 K/uL (12-01 @ 13:44)    12-05    133<L>  |  96  |  46<H>  ----------------------------<  150<H>  4.0   |  24  |  9.06<H>    Ca    9.5      05 Dec 2018 07:01        CULTURES:   .Blood Blood  11-30 @ 23:45   No growth to date.  --  --      .Blood Blood  11-29 @ 18:29   No growth at 5 days.  --  --        RADIOLOGY:  no new studies 38y Male is under our care for right leg cellulitis. Patient admits right leg presentation has been improving, but still c/o lingering leg pain especially towards the ankle region. Pain management is on the case. Remains afebrile with normal wbc count.     REVIEW OF SYSTEMS:  [  ] Not able to illicit  General: no fevers no malaise  Chest: no cough no sob  GI: no nvd  : no urinary sxs   Skin: no rashes  Musculoskeletal: no trauma +leg pain  Neuro: no ha's no dizziness     MEDS:  clindamycin IVPB 600 milliGRAM(s) IV Intermittent every 8 hours    ALLERGIES: aspirin (Swelling)  penicillins (Swelling)  shellfish (Unknown)    VITALS:  Vital Signs Last 24 Hrs  T(C): 36.6 (05 Dec 2018 12:32), Max: 37 (04 Dec 2018 20:50)  T(F): 97.8 (05 Dec 2018 12:32), Max: 98.6 (04 Dec 2018 20:50)  HR: 81 (05 Dec 2018 12:32) (80 - 83)  BP: 141/75 (05 Dec 2018 12:32) (141/75 - 141/87)  BP(mean): 96 (05 Dec 2018 04:42) (96 - 96)  RR: 16 (05 Dec 2018 12:32) (16 - 20)  SpO2: 96% (05 Dec 2018 12:32) (96% - 97%)      PHYSICAL EXAM:  HEENT: n/a  Neck: supple no LN's   Respiratory: lungs clear no rales  Cardiovascular: S1 S2 reg no murmurs  Gastrointestinal: +BS with soft, nondistended abdomen; nontender  Extremities: decreased RLE edema noted by increased skin wrinkling  Skin: noted lingering erythema and warmth by right shin +tenderness by distal aspect of RLE near ankle  Ortho: n/a  Neuro: AAO x 3      LABS/DIAGNOSTIC TESTS:                        10.0   9.2   )-----------( 358      ( 05 Dec 2018 07:01 )             30.5     WBC Count: 9.2 K/uL (12-05 @ 07:01)  WBC Count: 10.5 K/uL (12-04 @ 10:35)  WBC Count: 9.2 K/uL (12-03 @ 06:27)  WBC Count: 8.4 K/uL (12-02 @ 08:18)  WBC Count: 8.9 K/uL (12-01 @ 13:44)    12-05    133<L>  |  96  |  46<H>  ----------------------------<  150<H>  4.0   |  24  |  9.06<H>    Ca    9.5      05 Dec 2018 07:01        CULTURES:   .Blood Blood  11-30 @ 23:45   No growth to date.  --  --      .Blood Blood  11-29 @ 18:29   No growth at 5 days.  --  --        RADIOLOGY:  no new studies

## 2018-12-06 ENCOUNTER — TRANSCRIPTION ENCOUNTER (OUTPATIENT)
Age: 38
End: 2018-12-06

## 2018-12-06 VITALS
OXYGEN SATURATION: 100 % | HEART RATE: 87 BPM | SYSTOLIC BLOOD PRESSURE: 130 MMHG | TEMPERATURE: 98 F | DIASTOLIC BLOOD PRESSURE: 99 MMHG | RESPIRATION RATE: 16 BRPM

## 2018-12-06 LAB
CULTURE RESULTS: SIGNIFICANT CHANGE UP
GLUCOSE BLDC GLUCOMTR-MCNC: 100 MG/DL — HIGH (ref 70–99)
GLUCOSE BLDC GLUCOMTR-MCNC: 133 MG/DL — HIGH (ref 70–99)
SPECIMEN SOURCE: SIGNIFICANT CHANGE UP

## 2018-12-06 PROCEDURE — 80048 BASIC METABOLIC PNL TOTAL CA: CPT

## 2018-12-06 PROCEDURE — 80307 DRUG TEST PRSMV CHEM ANLYZR: CPT

## 2018-12-06 PROCEDURE — 84100 ASSAY OF PHOSPHORUS: CPT

## 2018-12-06 PROCEDURE — 80053 COMPREHEN METABOLIC PANEL: CPT

## 2018-12-06 PROCEDURE — 82962 GLUCOSE BLOOD TEST: CPT

## 2018-12-06 PROCEDURE — 96374 THER/PROPH/DIAG INJ IV PUSH: CPT

## 2018-12-06 PROCEDURE — 85652 RBC SED RATE AUTOMATED: CPT

## 2018-12-06 PROCEDURE — 73718 MRI LOWER EXTREMITY W/O DYE: CPT

## 2018-12-06 PROCEDURE — 87040 BLOOD CULTURE FOR BACTERIA: CPT

## 2018-12-06 PROCEDURE — 86140 C-REACTIVE PROTEIN: CPT

## 2018-12-06 PROCEDURE — 99285 EMERGENCY DEPT VISIT HI MDM: CPT | Mod: 25

## 2018-12-06 PROCEDURE — 83605 ASSAY OF LACTIC ACID: CPT

## 2018-12-06 PROCEDURE — 97161 PT EVAL LOW COMPLEX 20 MIN: CPT

## 2018-12-06 PROCEDURE — 83735 ASSAY OF MAGNESIUM: CPT

## 2018-12-06 PROCEDURE — 73590 X-RAY EXAM OF LOWER LEG: CPT

## 2018-12-06 PROCEDURE — 93971 EXTREMITY STUDY: CPT

## 2018-12-06 PROCEDURE — 96375 TX/PRO/DX INJ NEW DRUG ADDON: CPT

## 2018-12-06 PROCEDURE — 99261: CPT

## 2018-12-06 PROCEDURE — 90686 IIV4 VACC NO PRSV 0.5 ML IM: CPT

## 2018-12-06 PROCEDURE — 85027 COMPLETE CBC AUTOMATED: CPT

## 2018-12-06 PROCEDURE — 72100 X-RAY EXAM L-S SPINE 2/3 VWS: CPT

## 2018-12-06 RX ORDER — POLYETHYLENE GLYCOL 3350 17 G/17G
17 POWDER, FOR SOLUTION ORAL
Qty: 0 | Refills: 0 | COMMUNITY
Start: 2018-12-06

## 2018-12-06 RX ORDER — HYDROMORPHONE HYDROCHLORIDE 2 MG/ML
1 INJECTION INTRAMUSCULAR; INTRAVENOUS; SUBCUTANEOUS
Qty: 20 | Refills: 0 | OUTPATIENT
Start: 2018-12-06 | End: 2018-12-10

## 2018-12-06 RX ORDER — INSULIN GLARGINE 100 [IU]/ML
20 INJECTION, SOLUTION SUBCUTANEOUS
Qty: 0 | Refills: 0 | COMMUNITY

## 2018-12-06 RX ORDER — AMLODIPINE BESYLATE 2.5 MG/1
1 TABLET ORAL
Qty: 0 | Refills: 0 | COMMUNITY
Start: 2018-12-06

## 2018-12-06 RX ORDER — INSULIN ASPART 100 [IU]/ML
10 INJECTION, SOLUTION SUBCUTANEOUS
Qty: 0 | Refills: 0 | COMMUNITY

## 2018-12-06 RX ADMIN — ERYTHROPOIETIN 2000 UNIT(S): 10000 INJECTION, SOLUTION INTRAVENOUS; SUBCUTANEOUS at 10:02

## 2018-12-06 RX ADMIN — POLYETHYLENE GLYCOL 3350 17 GRAM(S): 17 POWDER, FOR SOLUTION ORAL at 14:42

## 2018-12-06 RX ADMIN — HEPARIN SODIUM 5000 UNIT(S): 5000 INJECTION INTRAVENOUS; SUBCUTANEOUS at 14:44

## 2018-12-06 RX ADMIN — HYDROMORPHONE HYDROCHLORIDE 1 MILLIGRAM(S): 2 INJECTION INTRAMUSCULAR; INTRAVENOUS; SUBCUTANEOUS at 14:40

## 2018-12-06 RX ADMIN — MIRTAZAPINE 45 MILLIGRAM(S): 45 TABLET, ORALLY DISINTEGRATING ORAL at 14:43

## 2018-12-06 RX ADMIN — HEPARIN SODIUM 5000 UNIT(S): 5000 INJECTION INTRAVENOUS; SUBCUTANEOUS at 05:26

## 2018-12-06 RX ADMIN — CINACALCET 30 MILLIGRAM(S): 30 TABLET, FILM COATED ORAL at 14:42

## 2018-12-06 RX ADMIN — ESCITALOPRAM OXALATE 10 MILLIGRAM(S): 10 TABLET, FILM COATED ORAL at 14:43

## 2018-12-06 RX ADMIN — Medication 100 MILLIGRAM(S): at 05:26

## 2018-12-06 RX ADMIN — Medication 400 MILLIGRAM(S): at 05:24

## 2018-12-06 RX ADMIN — Medication 0: at 08:46

## 2018-12-06 RX ADMIN — QUETIAPINE FUMARATE 200 MILLIGRAM(S): 200 TABLET, FILM COATED ORAL at 14:43

## 2018-12-06 RX ADMIN — SEVELAMER CARBONATE 1600 MILLIGRAM(S): 2400 POWDER, FOR SUSPENSION ORAL at 14:44

## 2018-12-06 RX ADMIN — Medication 1000 MILLIGRAM(S): at 05:45

## 2018-12-06 RX ADMIN — INFLUENZA VIRUS VACCINE 0.5 MILLILITER(S): 15; 15; 15; 15 SUSPENSION INTRAMUSCULAR at 14:41

## 2018-12-06 RX ADMIN — PANTOPRAZOLE SODIUM 40 MILLIGRAM(S): 20 TABLET, DELAYED RELEASE ORAL at 05:27

## 2018-12-06 RX ADMIN — GABAPENTIN 400 MILLIGRAM(S): 400 CAPSULE ORAL at 05:26

## 2018-12-06 RX ADMIN — HYDROMORPHONE HYDROCHLORIDE 1 MILLIGRAM(S): 2 INJECTION INTRAMUSCULAR; INTRAVENOUS; SUBCUTANEOUS at 03:26

## 2018-12-06 RX ADMIN — Medication 100 MILLIGRAM(S): at 14:43

## 2018-12-06 RX ADMIN — SEVELAMER CARBONATE 1600 MILLIGRAM(S): 2400 POWDER, FOR SUSPENSION ORAL at 05:26

## 2018-12-06 RX ADMIN — Medication 1000 MILLIGRAM(S): at 00:02

## 2018-12-06 RX ADMIN — HYDROMORPHONE HYDROCHLORIDE 1 MILLIGRAM(S): 2 INJECTION INTRAMUSCULAR; INTRAVENOUS; SUBCUTANEOUS at 08:59

## 2018-12-06 RX ADMIN — Medication 5 UNIT(S): at 08:47

## 2018-12-06 RX ADMIN — HYDROMORPHONE HYDROCHLORIDE 1 MILLIGRAM(S): 2 INJECTION INTRAMUSCULAR; INTRAVENOUS; SUBCUTANEOUS at 03:45

## 2018-12-06 NOTE — PROGRESS NOTE ADULT - PROBLEM SELECTOR PLAN 1
-Patient admitted for right LE pain , improving swelling and redness   - will change Clindamycin to vancomycin for 3 doses with HD outpt upon discharge  -f/u blood cultures, NTD   -podiatry following   -Pain improving   -Elevate Extremity  -MRI negative for  abscess

## 2018-12-06 NOTE — DISCHARGE NOTE ADULT - MEDICATION SUMMARY - MEDICATIONS TO CHANGE
I will SWITCH the dose or number of times a day I take the medications listed below when I get home from the hospital:    Lantus 100 units/mL subcutaneous solution  -- 20 unit(s) subcutaneous once a day (at bedtime)    NovoLOG 100 units/mL subcutaneous solution  -- 10 unit(s) subcutaneous 3 times a day

## 2018-12-06 NOTE — DISCHARGE NOTE ADULT - MEDICATION SUMMARY - MEDICATIONS TO TAKE
I will START or STAY ON the medications listed below when I get home from the hospital:    Neurontin 400 mg oral capsule  -- 1 cap(s) by mouth 2 times a day  -- Indication: For neuropathy    mirtazapine 45 mg oral tablet  -- 1 tab(s) by mouth once a day  -- Indication: For sleep disturbance    escitalopram 10 mg oral tablet  -- 1 tab(s) by mouth once a day  -- Indication: For Depression    NovoLOG 100 units/mL subcutaneous solution  -- 5 unit(s) subcutaneous 3 times a day  -- Indication: For Diabetes    Lantus 100 units/mL subcutaneous solution  -- 15 unit(s) subcutaneous once a day (at bedtime)  -- Indication: For Diabetes    simvastatin 40 mg oral tablet  -- 1 tab(s) by mouth once a day (at bedtime)  -- Indication: For Hyperlipidemia    QUEtiapine 100 mg oral tablet  -- 2 tab(s) by mouth once a day  -- Indication: For Depression    Sensipar 30 mg oral tablet  -- 1 tab(s) by mouth once a day  -- Indication: For Hypercalcemia    amLODIPine 2.5 mg oral tablet  -- 1 tab(s) by mouth once a day (at bedtime)  -- Indication: For Htn    vancomycin 1.5 g/300 mL-NaCl 0.9% intravenous solution  -- 1.5 gram(s) intravenous 3 times a week  . with dialysis for total of 3 dose. dialysis tues, thurs, sat   -- Indication: For Cellulitis    raNITIdine 150 mg oral tablet  -- 1 tab(s) by mouth 2 times a day, As Needed  -- Indication: For Prophylactic measure    polyethylene glycol 3350 oral powder for reconstitution  -- 17 gram(s) by mouth once a day  -- Indication: For Constipation    Renvela 800 mg oral tablet  -- 2 tab(s) by mouth 3 times a day (with meals)  -- Indication: For ESRD on dialysis    Velphoro 2500 mg (500 mg elemental iron) oral tablet, chewable  -- 1 tab(s) by mouth 3 times a day (with meals)  -- Indication: For ESRD on dialysis    omeprazole 20 mg oral delayed release capsule  -- 1 cap(s) by mouth once a day  -- Indication: For Prophylactic measure    Nicoderm C-Q 21 mg/24 hr transdermal film, extended release  -- 1 patch transdermally once a day   -- Do not take this drug if you are pregnant.  For external use only.  It is very important that you take or use this exactly as directed.  Do not skip doses or discontinue unless directed by your doctor.  Remove old patch prior to applying a new patch.    -- Indication: For smoking cessation    Nephro-Oly oral tablet  -- 1 tab(s) by mouth once a day  -- Indication: For supplement I will START or STAY ON the medications listed below when I get home from the hospital:    Dilaudid 2 mg oral tablet  -- 1 tab(s) by mouth every 6 hours, As Needed MDD:4   -- Caution federal law prohibits the transfer of this drug to any person other  than the person for whom it was prescribed.  May cause drowsiness.  Alcohol may intensify this effect.  Use care when operating dangerous machinery.  This prescription cannot be refilled.  Using more of this medication than prescribed may cause serious breathing problems.    -- Indication: For Pain    Neurontin 400 mg oral capsule  -- 1 cap(s) by mouth 2 times a day  -- Indication: For neuropathy    mirtazapine 45 mg oral tablet  -- 1 tab(s) by mouth once a day  -- Indication: For sleep disturbance    escitalopram 10 mg oral tablet  -- 1 tab(s) by mouth once a day  -- Indication: For Depression    NovoLOG 100 units/mL subcutaneous solution  -- 5 unit(s) subcutaneous 3 times a day  -- Indication: For Diabetes    Lantus 100 units/mL subcutaneous solution  -- 15 unit(s) subcutaneous once a day (at bedtime)  -- Indication: For Diabetes    simvastatin 40 mg oral tablet  -- 1 tab(s) by mouth once a day (at bedtime)  -- Indication: For Hyperlipidemia    QUEtiapine 100 mg oral tablet  -- 2 tab(s) by mouth once a day  -- Indication: For Depression    Sensipar 30 mg oral tablet  -- 1 tab(s) by mouth once a day  -- Indication: For Hypercalcemia    amLODIPine 2.5 mg oral tablet  -- 1 tab(s) by mouth once a day (at bedtime)  -- Indication: For Htn    vancomycin 1.5 g/300 mL-NaCl 0.9% intravenous solution  -- 1.5 gram(s) intravenous 3 times a week  . with dialysis for total of 3 dose. dialysis tues, thurs, sat   -- Indication: For Cellulitis of right lower extremity    raNITIdine 150 mg oral tablet  -- 1 tab(s) by mouth 2 times a day, As Needed  -- Indication: For Prophylactic measure    polyethylene glycol 3350 oral powder for reconstitution  -- 17 gram(s) by mouth once a day  -- Indication: For Constipation    Renvela 800 mg oral tablet  -- 2 tab(s) by mouth 3 times a day (with meals)  -- Indication: For ESRD on dialysis    Velphoro 2500 mg (500 mg elemental iron) oral tablet, chewable  -- 1 tab(s) by mouth 3 times a day (with meals)  -- Indication: For ESRD on dialysis    omeprazole 20 mg oral delayed release capsule  -- 1 cap(s) by mouth once a day  -- Indication: For Prophylactic measure    Nicoderm C-Q 21 mg/24 hr transdermal film, extended release  -- 1 patch transdermally once a day   -- Do not take this drug if you are pregnant.  For external use only.  It is very important that you take or use this exactly as directed.  Do not skip doses or discontinue unless directed by your doctor.  Remove old patch prior to applying a new patch.    -- Indication: For smoking cessation    Nephro-Oly oral tablet  -- 1 tab(s) by mouth once a day  -- Indication: For supplement

## 2018-12-06 NOTE — DISCHARGE NOTE ADULT - CARE PLAN
Principal Discharge DX:	Cellulitis  Goal:	remain stable  Assessment and plan of treatment:	Take all of your antibiotics as ordered.  Call your Health Care Provider within two days of arriving home to make a follow up appointment within one week.  If the affected cellulitic area increases in redness, warmth, pain or swelling call your Health Care Provider.  If you develop fever, chills, and/or malaise, call your Health Care Provider.  Secondary Diagnosis:	ESRD on dialysis  Assessment and plan of treatment:	Avoid taking (NSAIDs) - (ex: Ibuprofen, Advil, Celebrex, Naprosyn)  Avoid taking any nephrotoxic agents (can harm kidneys) - Intravenous contrast for diagnostic testing, combination cold medications.  Have all medications adjusted for your renal function by your Health Care Provider.  Blood pressure control is important.  Take all medication as prescribed.  dialysis on tues, thursday and saturday  Secondary Diagnosis:	Hypercalcemia  Assessment and plan of treatment:	continue current medications  Secondary Diagnosis:	Diabetes  Assessment and plan of treatment:	HgA1C this admission 7.0  Make sure you get your HgA1c checked every three months.  If you take oral diabetes medications, check your blood glucose two times a day.  If you take insulin, check your blood glucose before meals and at bedtime.  It's important not to skip any meals.  Keep a log of your blood glucose results and always take it with you to your doctor appointments.  Keep a list of your current medications including injectables and over the counter medications and bring this medication list with you to all your doctor appointments.  If you have not seen your ophthalmologist this year call for appointment.  Check your feet daily for redness, sores, or openings. Do not self treat. If no improvement in two days call your primary care physician for an appointment.  Low blood sugar (hypoglycemia) is a blood sugar below 70mg/dl. Check your blood sugar if you feel signs/symptoms of hypoglycemia. If your blood sugar is below 70 take 15 grams of carbohydrates (ex 4 oz of apple juice, 3-4 glucose tablets, or 4-6 oz of regular soda) wait 15 minutes and repeat blood sugar to make sure it comes up above 70.  If your blood sugar is above 70 and you are due for a meal, have a meal.  If you are not due for a meal have a snack.  This snack helps keeps your blood sugar at a safe range.

## 2018-12-06 NOTE — PROGRESS NOTE ADULT - PROBLEM SELECTOR PLAN 1
- dc home on dilaudid 2mg po q 4 hours prn  - no nsaids   - acetaminophen in between if needed  - abx for cellulitis  - gabapentin 400mg po q 12 hours  - stool softeners

## 2018-12-06 NOTE — PROGRESS NOTE ADULT - PROBLEM SELECTOR PLAN 2
-T-T-S, dialysis   -Nephro Dr. La following   -c/w sensipar

## 2018-12-06 NOTE — PROGRESS NOTE ADULT - SUBJECTIVE AND OBJECTIVE BOX
HPI:  Patient is a 37 y/o M from Home with History of ESRD on HD TTS via left arm AV fistula/graft x 2 years, HTN, HLD, DM, chronic back pain presented with complaint of right leg swelling and redness. Patient was admitted to [facility] and discharged on friday for missing dialysis. He then had a scrap on his leg and then developed redness and swelling at the site. Patient also complaints of chills , nausea , NBNP vomiting. No fever, no chest pain , no sob , no dizziness , no headache.     In Ed , BP: 186/110 mm hg , HR: 71 , Temp: 98.7 F  Cbc shows elevated white count with left shift  Bmp with elevated BUN/ Creatinine   Lactate WNL. (29 Nov 2018 18:21)      Patient is a 38y old  Male who presents with a chief complaint of right leg swelling , redness (06 Dec 2018 11:44)      INTERVAL HPI/OVERNIGHT EVENTS:  T(C): 36.4 (12-06-18 @ 09:55), Max: 36.4 (12-06-18 @ 09:55)  HR: 70 (12-06-18 @ 09:55) (70 - 74)  BP: 132/79 (12-06-18 @ 09:55) (132/79 - 162/92)  RR: 16 (12-06-18 @ 09:55) (16 - 20)  SpO2: 92% (12-06-18 @ 05:34) (92% - 99%)  Wt(kg): --  I&O's Summary      REVIEW OF SYSTEMS: denies fever, chills, SOB, palpitations, chest pain, abdominal pain, nausea, vomitting, diarrhea, constipation, dizziness    MEDICATIONS  (STANDING):  acetaminophen  IVPB .. 1000 milliGRAM(s) IV Intermittent once  amLODIPine   Tablet 2.5 milliGRAM(s) Oral at bedtime  cinacalcet 30 milliGRAM(s) Oral daily  clindamycin IVPB 600 milliGRAM(s) IV Intermittent every 8 hours  epoetin cyndie Injectable 2000 Unit(s) IV Push <User Schedule>  escitalopram 10 milliGRAM(s) Oral daily  gabapentin 400 milliGRAM(s) Oral two times a day  heparin  Injectable 5000 Unit(s) SubCutaneous every 8 hours  influenza   Vaccine 0.5 milliLiter(s) IntraMuscular once  insulin glargine Injectable (LANTUS) 15 Unit(s) SubCutaneous at bedtime  insulin lispro (HumaLOG) corrective regimen sliding scale   SubCutaneous Before meals and at bedtime  insulin lispro Injectable (HumaLOG) 5 Unit(s) SubCutaneous three times a day before meals  mirtazapine 45 milliGRAM(s) Oral daily  pantoprazole    Tablet 40 milliGRAM(s) Oral before breakfast  polyethylene glycol 3350 17 Gram(s) Oral daily  QUEtiapine 200 milliGRAM(s) Oral daily  sevelamer hydrochloride 1600 milliGRAM(s) Oral every 8 hours  simvastatin 40 milliGRAM(s) Oral at bedtime    MEDICATIONS  (PRN):  acetaminophen   Tablet .. 650 milliGRAM(s) Oral every 6 hours PRN Temp greater or equal to 38C (100.4F), Mild Pain (1 - 3)  HYDROmorphone  Injectable 1 milliGRAM(s) IV Push every 6 hours PRN Severe Pain (7 - 10)  oxyCODONE    IR 5 milliGRAM(s) Oral every 6 hours PRN Moderate Pain (4 - 6)      PHYSICAL EXAM:  GENERAL: NAD, well-groomed, well-developed  HEAD:  Atraumatic, Normocephalic  EYES: EOMI, PERRLA, conjunctiva and sclera clear  ENMT: No tonsillar erythema, exudates, or enlargement; Moist mucous membranes, Good dentition, No lesions  NECK: Supple, No JVD, Normal thyroid  NERVOUS SYSTEM:  Alert & Oriented X3, Good concentration; Motor Strength 5/5 B/L upper and lower extremities; DTRs 2+ intact and symmetric  CHEST/LUNG: Clear to percussion bilaterally; No rales, rhonchi, wheezing, or rubs  HEART: Regular rate and rhythm; No murmurs, rubs, or gallops  ABDOMEN: Soft, Nontender, Nondistended; Bowel sounds present  EXTREMITIES:  2+ Peripheral Pulses, No clubbing, cyanosis, or edema  LYMPH: No lymphadenopathy noted  SKIN: No rashes or lesions  LABS:                        10.0   9.2   )-----------( 358      ( 05 Dec 2018 07:01 )             30.5     12-05    133<L>  |  96  |  46<H>  ----------------------------<  150<H>  4.0   |  24  |  9.06<H>    Ca    9.5      05 Dec 2018 07:01          CAPILLARY BLOOD GLUCOSE      POCT Blood Glucose.: 133 mg/dL (06 Dec 2018 11:12)  POCT Blood Glucose.: 100 mg/dL (06 Dec 2018 08:10)  POCT Blood Glucose.: 106 mg/dL (05 Dec 2018 21:42)  POCT Blood Glucose.: 162 mg/dL (05 Dec 2018 17:00)

## 2018-12-06 NOTE — DISCHARGE NOTE ADULT - HOSPITAL COURSE
Patient is a 39 y/o M from Home with History of ESRD on HD TTS via left arm AV fistula/graft x 2 years, HTN, HLD, DM, chronic back pain presented with complaint of right leg swelling and redness. [facility] for [facility] dialysis. He then had a scrap on his leg and then developed redness and swelling at the site. admitted for Cellulitis of Right LE. on IV abx.  MRI negative for abscess. medically stable for discharge with continuation of abx with HD outpt

## 2018-12-06 NOTE — PROGRESS NOTE ADULT - SUBJECTIVE AND OBJECTIVE BOX
38y Male is under our care for right leg cellulitis. Patient is currently in hemodialysis. Patient admits right leg pain has slightly improved today. Remains afebrile with normal wbc count. Due for dc home today on IV antibiotics via dialysis.    REVIEW OF SYSTEMS:  [  ] Not able to illicit  General: no fevers no malaise  Chest: no cough no sob  GI: no nvd   Skin: no rashes  Musculoskeletal: no trauma +leg pain  Neuro: no ha's no dizziness     MEDS:  clindamycin IVPB 600 milliGRAM(s) IV Intermittent every 8 hours    ALLERGIES: aspirin (Swelling)  penicillins (Swelling)  shellfish (Unknown)    VITALS:  Vital Signs Last 24 Hrs  T(C): 36.7 (06 Dec 2018 14:02), Max: 36.7 (06 Dec 2018 14:02)  T(F): 98 (06 Dec 2018 14:02), Max: 98 (06 Dec 2018 14:02)  HR: 70 (06 Dec 2018 14:02) (70 - 74)  BP: 138/84 (06 Dec 2018 14:02) (132/79 - 162/92)  BP(mean): --  RR: 15 (06 Dec 2018 14:02) (15 - 20)  SpO2: 92% (06 Dec 2018 05:34) (92% - 99%)      PHYSICAL EXAM:  HEENT: n/a  Neck: supple no LN's   Respiratory: lungs clear no rales  Cardiovascular: S1 S2 reg no murmurs  Gastrointestinal: +BS with soft, nondistended abdomen; nontender  Extremities: decreasing RLE edema   Skin: slightly improved erythema and warmth by right shin  +tenderness by ankle region  Ortho: n/a  Neuro: AAO x 3      LABS/DIAGNOSTIC TESTS:  No new labs         CULTURES:   .Blood Blood  11-30 @ 23:45   No growth to date.  --  --      .Blood Blood  11-29 @ 18:29   No growth at 5 days.  --  --        RADIOLOGY:  no new studies

## 2018-12-06 NOTE — PROGRESS NOTE ADULT - ASSESSMENT
Patient is a 39 y/o M from Home with History of ESRD on HD TTS via left arm AV fistula/graft x 2 years, HTN, HLD, DM, chronic back pain presented with complaint of right leg swelling and redness. Patient was admitted to [facility] and discharged on friday for missing dialysis. He then had a scrap on his leg and then developed redness and swelling at the site. admitted for Cellulitis of Right LE. on IV abx.  MRI negative for abscess. medically stable for discharge with continuation of abx with HD outpt

## 2018-12-06 NOTE — PROGRESS NOTE ADULT - PROBLEM SELECTOR PLAN 4
-resolved, Ca level 10.0 today  -cont home dose of Sensipar
-resolved, Ca level 9.5 12/5  -cont home dose of Sensipar
-resolved, Ca level 9.5 today  -cont home dose of Sensipar

## 2018-12-06 NOTE — PROGRESS NOTE ADULT - ASSESSMENT
1.	Right leg cellulitis - improving  ·	dc planning today  ·	dc on vancomycin 1500mgs iv 3 x week(at dialysis) x total of 3 doses  ·	reconsult prn

## 2018-12-06 NOTE — PROGRESS NOTE ADULT - SUBJECTIVE AND OBJECTIVE BOX
NP Note discussed with  Primary Attending    Patient is a 38y old  Male who presents with a chief complaint of right leg swelling , redness (06 Dec 2018 13:10).  Pt with right leg cellulitis.  Pt complaining of right leg pain especially upon touch.  Pain worsens upon movement.  Pain alleviated with pain meds.  No fevers, no chest pain or sob.  Pt is for discharge home today.        INTERVAL HPI/OVERNIGHT EVENTS: no new complaints    MEDICATIONS  (STANDING):  acetaminophen  IVPB .. 1000 milliGRAM(s) IV Intermittent once  amLODIPine   Tablet 2.5 milliGRAM(s) Oral at bedtime  cinacalcet 30 milliGRAM(s) Oral daily  clindamycin IVPB 600 milliGRAM(s) IV Intermittent every 8 hours  epoetin cyndie Injectable 2000 Unit(s) IV Push <User Schedule>  escitalopram 10 milliGRAM(s) Oral daily  gabapentin 400 milliGRAM(s) Oral two times a day  heparin  Injectable 5000 Unit(s) SubCutaneous every 8 hours  influenza   Vaccine 0.5 milliLiter(s) IntraMuscular once  insulin glargine Injectable (LANTUS) 15 Unit(s) SubCutaneous at bedtime  insulin lispro (HumaLOG) corrective regimen sliding scale   SubCutaneous Before meals and at bedtime  insulin lispro Injectable (HumaLOG) 5 Unit(s) SubCutaneous three times a day before meals  mirtazapine 45 milliGRAM(s) Oral daily  pantoprazole    Tablet 40 milliGRAM(s) Oral before breakfast  polyethylene glycol 3350 17 Gram(s) Oral daily  QUEtiapine 200 milliGRAM(s) Oral daily  sevelamer hydrochloride 1600 milliGRAM(s) Oral every 8 hours  simvastatin 40 milliGRAM(s) Oral at bedtime    MEDICATIONS  (PRN):  acetaminophen   Tablet .. 650 milliGRAM(s) Oral every 6 hours PRN Temp greater or equal to 38C (100.4F), Mild Pain (1 - 3)  HYDROmorphone  Injectable 1 milliGRAM(s) IV Push every 6 hours PRN Severe Pain (7 - 10)  oxyCODONE    IR 5 milliGRAM(s) Oral every 6 hours PRN Moderate Pain (4 - 6)      __________________________________________________  REVIEW OF SYSTEMS:    CONSTITUTIONAL: No fever,   RESPIRATORY: No cough; No shortness of breath  CARDIOVASCULAR: No chest pain, no palpitations  GASTROINTESTINAL: No pain. No nausea or vomiting; No diarrhea   NEUROLOGICAL: No headache or numbness, no tremors  MUSCULOSKELETAL: + right leg pain  GENITOURINARY: no dysuria + crf - hd - mwf        Vital Signs Last 24 Hrs  T(C): 36.4 (06 Dec 2018 09:55), Max: 36.4 (06 Dec 2018 09:55)  T(F): 97.6 (06 Dec 2018 09:55), Max: 97.6 (06 Dec 2018 09:55)  HR: 70 (06 Dec 2018 09:55) (70 - 74)  BP: 132/79 (06 Dec 2018 09:55) (132/79 - 162/92)  BP(mean): --  RR: 16 (06 Dec 2018 09:55) (16 - 20)  SpO2: 92% (06 Dec 2018 05:34) (92% - 99%)    ________________________________________________  PHYSICAL EXAM:  GENERAL: NAD  CHEST/LUNG: Clear to auscultation bilaterally with good air entry   HEART: S1 S2  regular; no murmurs, gallops or rubs  ABDOMEN: Soft, Nontender, Nondistended; Bowel sounds present  EXTREMITIES: no cyanosis; no edema; no calf tenderness  NERVOUS SYSTEM:  Awake and alert; Oriented  to place, person and time  MUSCULOSKELETAL: + decreased rom - right leg.  + right leg erythema, swelling and tendeness on touch  _________________________________________________  LABS:                        10.0   9.2   )-----------( 358      ( 05 Dec 2018 07:01 )             30.5     12-05    133<L>  |  96  |  46<H>  ----------------------------<  150<H>  4.0   |  24  |  9.06<H>    Ca    9.5      05 Dec 2018 07:01          CAPILLARY BLOOD GLUCOSE      POCT Blood Glucose.: 133 mg/dL (06 Dec 2018 11:12)  POCT Blood Glucose.: 100 mg/dL (06 Dec 2018 08:10)  POCT Blood Glucose.: 106 mg/dL (05 Dec 2018 21:42)  POCT Blood Glucose.: 162 mg/dL (05 Dec 2018 17:00)        RADIOLOGY & ADDITIONAL TESTS:    Imaging Personally Reviewed:  YES/NO    Consultant(s) Notes Reviewed:   YES/ No    Care Discussed with Consultants :     Plan of care was discussed with patient and /or primary care giver; all questions and concerns were addressed and care was aligned with patient's wishes.

## 2018-12-06 NOTE — PROGRESS NOTE ADULT - PROBLEM SELECTOR PLAN 5
-Supportive measures   -Needs outpatient Bariatric referral.

## 2018-12-06 NOTE — PROGRESS NOTE ADULT - ASSESSMENT
comfortable not in any distress  pain better, no fever   vs stable    as per ID  vanco during hd for 3 more doses  d/w pt if any increasing pain or redness in leg  fever  go to er   f/u with pcp  nephrologist  control  dm,  try to avoid any breakdown on skin  no bare foot walking etc

## 2018-12-06 NOTE — DISCHARGE NOTE ADULT - PATIENT PORTAL LINK FT
You can access the [facility], offered by [facility], by registering with the following website: [email or web address]

## 2018-12-06 NOTE — DISCHARGE NOTE ADULT - PLAN OF CARE
remain stable Take all of your antibiotics as ordered.  Call your Health Care Provider within two days of arriving home to make a follow up appointment within one week.  If the affected cellulitic area increases in redness, warmth, pain or swelling call your Health Care Provider.  If you develop fever, chills, and/or malaise, call your Health Care Provider. Avoid taking (NSAIDs) - (ex: Ibuprofen, Advil, Celebrex, Naprosyn)  Avoid taking any nephrotoxic agents (can harm kidneys) - Intravenous contrast for diagnostic testing, combination cold medications.  Have all medications adjusted for your renal function by your Health Care Provider.  Blood pressure control is important.  Take all medication as prescribed.  dialysis on tues, thursday and saturday continue current medications HgA1C this admission 7.0  Make sure you get your HgA1c checked every three months.  If you take oral diabetes medications, check your blood glucose two times a day.  If you take insulin, check your blood glucose before meals and at bedtime.  It's important not to skip any meals.  Keep a log of your blood glucose results and always take it with you to your doctor appointments.  Keep a list of your current medications including injectables and over the counter medications and bring this medication list with you to all your doctor appointments.  If you have not seen your ophthalmologist this year call for appointment.  Check your feet daily for redness, sores, or openings. Do not self treat. If no improvement in two days call your primary care physician for an appointment.  Low blood sugar (hypoglycemia) is a blood sugar below 70mg/dl. Check your blood sugar if you feel signs/symptoms of hypoglycemia. If your blood sugar is below 70 take 15 grams of carbohydrates (ex 4 oz of apple juice, 3-4 glucose tablets, or 4-6 oz of regular soda) wait 15 minutes and repeat blood sugar to make sure it comes up above 70.  If your blood sugar is above 70 and you are due for a meal, have a meal.  If you are not due for a meal have a snack.  This snack helps keeps your blood sugar at a safe range.

## 2018-12-06 NOTE — PROGRESS NOTE ADULT - SUBJECTIVE AND OBJECTIVE BOX
Patient is a 38y old  Male who presents with a chief complaint of right leg swelling , redness (05 Dec 2018 14:36)      INTERVAL HPI/OVERNIGHT EVENTS: no events overnight, MRI negative for abscess. LE redness and edema improving, pain better controlled        REVIEW OF SYSTEMS:  CONSTITUTIONAL: No fever, chills  ENMT:  No difficulty hearing, no change in vision  NECK: No pain or stiffness  RESPIRATORY: No cough, SOB  CARDIOVASCULAR: No chest pain, palpitations  GASTROINTESTINAL: No abdominal pain. No nausea, vomiting, or diarrhea  GENITOURINARY: No dysuria  NEUROLOGICAL: No HA  MUSCULOSKELETAL:  right lower extremity pain improving       T(C): 36.3 (12-06-18 @ 05:34), Max: 36.6 (12-05-18 @ 12:32)  HR: 74 (12-06-18 @ 05:34) (72 - 81)  BP: 162/92 (12-06-18 @ 05:34) (136/82 - 162/92)  RR: 18 (12-06-18 @ 05:34) (16 - 20)  SpO2: 92% (12-06-18 @ 05:34) (92% - 99%)  Wt(kg): --Vital Signs Last 24 Hrs  T(C): 36.3 (06 Dec 2018 05:34), Max: 36.6 (05 Dec 2018 12:32)  T(F): 97.4 (06 Dec 2018 05:34), Max: 97.8 (05 Dec 2018 12:32)  HR: 74 (06 Dec 2018 05:34) (72 - 81)  BP: 162/92 (06 Dec 2018 05:34) (136/82 - 162/92)  BP(mean): --  RR: 18 (06 Dec 2018 05:34) (16 - 20)  SpO2: 92% (06 Dec 2018 05:34) (92% - 99%)    MEDICATIONS  (PRN):  acetaminophen   Tablet .. 650 milliGRAM(s) Oral every 6 hours PRN Temp greater or equal to 38C (100.4F), Mild Pain (1 - 3)  HYDROmorphone  Injectable 1 milliGRAM(s) IV Push every 6 hours PRN Severe Pain (7 - 10)  oxyCODONE    IR 5 milliGRAM(s) Oral every 6 hours PRN Moderate Pain (4 - 6)      PHYSICAL EXAM:  GENERAL: NAD   EYES: clear conjunctiva   ENMT: Moist mucous membranes  NECK: Supple, No JVD   CHEST/LUNG: Clear to auscultation bilaterally; No rales, rhonchi, wheezing, or rubs  HEART: S1, S2, Regular rate and rhythm  ABDOMEN: Soft, Nontender, Nondistended; Bowel sounds present  NEURO: Alert & Oriented X3  EXTREMITIES:  RLE pain improving   SKIN: RLE redness and edema improved     Consultant(s) Notes Reviewed:  [x ] YES  [ ] NO  Care Discussed with Consultants/Other Providers [ x] YES  [ ] NO    LABS:                        10.0   9.2   )-----------( 358      ( 05 Dec 2018 07:01 )             30.5     12-05    133<L>  |  96  |  46<H>  ----------------------------<  150<H>  4.0   |  24  |  9.06<H>    Ca    9.5      05 Dec 2018 07:01          CAPILLARY BLOOD GLUCOSE      POCT Blood Glucose.: 100 mg/dL (06 Dec 2018 08:10)  POCT Blood Glucose.: 106 mg/dL (05 Dec 2018 21:42)  POCT Blood Glucose.: 162 mg/dL (05 Dec 2018 17:00)  POCT Blood Glucose.: 112 mg/dL (05 Dec 2018 11:33)            RADIOLOGY & ADDITIONAL TESTS:    Imaging Personally Reviewed:  [ ] YES  [ ] NO

## 2018-12-06 NOTE — PROGRESS NOTE ADULT - REASON FOR ADMISSION
right leg swelling , redness

## 2018-12-06 NOTE — PROGRESS NOTE ADULT - PROVIDER SPECIALTY LIST ADULT
Infectious Disease
Internal Medicine
Nephrology
Pain Medicine
Podiatry
Podiatry
Internal Medicine
Pain Medicine
Internal Medicine

## 2018-12-06 NOTE — PROGRESS NOTE ADULT - PROBLEM SELECTOR PLAN 3
-Rfb4r-6.0  - cont Lantus and humalog SS coverage
-Ipr2n-0.0  - cont Lantus and humalog SS coverage
-Meq4x-7.0  - cont Lantus and humalog SS coverage

## 2018-12-06 NOTE — PROGRESS NOTE ADULT - PROBLEM SELECTOR PLAN 6
dvt ppx- cont heparin  ppi ppx- cont protonix

## 2018-12-08 RX ORDER — VANCOMYCIN HCL 1 G
1.5 VIAL (EA) INTRAVENOUS
Qty: 3 | Refills: 0 | OUTPATIENT
Start: 2018-12-08 | End: 2018-12-10

## 2018-12-08 RX ORDER — VANCOMYCIN HCL 1 G
1.5 VIAL (EA) INTRAVENOUS
Qty: 1.5 | Refills: 0 | OUTPATIENT
Start: 2018-12-08 | End: 2018-12-10

## 2018-12-27 NOTE — DISCHARGE NOTE ADULT - PROVIDER TOKENS
Provider reviewed discharge instructions with the patient. The patient verbalized understanding. All questions and concerns were addressed. The patient declined a wheelchair and is discharged ambulatory in the care of family members with instructions and prescriptions in hand. Pt is alert and oriented x 4. Respirations are clear and unlabored. TOKEN:'[id number]'

## 2018-12-28 ENCOUNTER — EMERGENCY (EMERGENCY)
Facility: HOSPITAL | Age: 38
LOS: 1 days | Discharge: HOME | End: 2018-12-28
Attending: EMERGENCY MEDICINE | Admitting: EMERGENCY MEDICINE
Payer: MEDICARE

## 2018-12-28 VITALS
RESPIRATION RATE: 18 BRPM | HEART RATE: 97 BPM | DIASTOLIC BLOOD PRESSURE: 91 MMHG | OXYGEN SATURATION: 95 % | WEIGHT: 315 LBS | HEIGHT: 78 IN | SYSTOLIC BLOOD PRESSURE: 134 MMHG

## 2018-12-28 DIAGNOSIS — R11.11 VOMITING WITHOUT NAUSEA: ICD-10-CM

## 2018-12-28 DIAGNOSIS — I12.0 HYPERTENSIVE CHRONIC KIDNEY DISEASE WITH STAGE 5 CHRONIC KIDNEY DISEASE OR END STAGE RENAL DISEASE: ICD-10-CM

## 2018-12-28 DIAGNOSIS — N17.8 OTHER ACUTE KIDNEY FAILURE: ICD-10-CM

## 2018-12-28 DIAGNOSIS — R10.9 UNSPECIFIED ABDOMINAL PAIN: ICD-10-CM

## 2018-12-28 DIAGNOSIS — N18.6 END STAGE RENAL DISEASE: ICD-10-CM

## 2018-12-28 DIAGNOSIS — Z88.6 ALLERGY STATUS TO ANALGESIC AGENT: ICD-10-CM

## 2018-12-28 DIAGNOSIS — E11.22 TYPE 2 DIABETES MELLITUS WITH DIABETIC CHRONIC KIDNEY DISEASE: ICD-10-CM

## 2018-12-28 DIAGNOSIS — Z98.89 OTHER SPECIFIED POSTPROCEDURAL STATES: Chronic | ICD-10-CM

## 2018-12-28 DIAGNOSIS — E66.9 OBESITY, UNSPECIFIED: ICD-10-CM

## 2018-12-28 DIAGNOSIS — Z88.0 ALLERGY STATUS TO PENICILLIN: ICD-10-CM

## 2018-12-28 DIAGNOSIS — Z99.2 DEPENDENCE ON RENAL DIALYSIS: ICD-10-CM

## 2018-12-28 LAB
ALBUMIN SERPL ELPH-MCNC: 3 G/DL — LOW (ref 3.5–5)
ALP SERPL-CCNC: 100 U/L — SIGNIFICANT CHANGE UP (ref 40–120)
ALT FLD-CCNC: 24 U/L DA — SIGNIFICANT CHANGE UP (ref 10–60)
ANION GAP SERPL CALC-SCNC: 13 MMOL/L — SIGNIFICANT CHANGE UP (ref 5–17)
APTT BLD: 33.8 SEC — SIGNIFICANT CHANGE UP (ref 27.5–36.3)
AST SERPL-CCNC: 16 U/L — SIGNIFICANT CHANGE UP (ref 10–40)
BASE EXCESS BLDV CALC-SCNC: -1.7 MMOL/L — SIGNIFICANT CHANGE UP (ref -2–2)
BASOPHILS # BLD AUTO: 0.1 K/UL — SIGNIFICANT CHANGE UP (ref 0–0.2)
BASOPHILS NFR BLD AUTO: 1.4 % — SIGNIFICANT CHANGE UP (ref 0–2)
BILIRUB SERPL-MCNC: 0.3 MG/DL — SIGNIFICANT CHANGE UP (ref 0.2–1.2)
BUN SERPL-MCNC: 66 MG/DL — HIGH (ref 7–18)
CALCIUM SERPL-MCNC: 10.6 MG/DL — HIGH (ref 8.4–10.5)
CHLORIDE SERPL-SCNC: 99 MMOL/L — SIGNIFICANT CHANGE UP (ref 96–108)
CO2 SERPL-SCNC: 23 MMOL/L — SIGNIFICANT CHANGE UP (ref 22–31)
CREAT SERPL-MCNC: 9.84 MG/DL — HIGH (ref 0.5–1.3)
EOSINOPHIL # BLD AUTO: 0.6 K/UL — HIGH (ref 0–0.5)
EOSINOPHIL NFR BLD AUTO: 5.2 % — SIGNIFICANT CHANGE UP (ref 0–6)
GLUCOSE SERPL-MCNC: 183 MG/DL — HIGH (ref 70–99)
HCO3 BLDV-SCNC: 23 MMOL/L — SIGNIFICANT CHANGE UP (ref 21–29)
HCT VFR BLD CALC: 34.9 % — LOW (ref 39–50)
HGB BLD-MCNC: 11.3 G/DL — LOW (ref 13–17)
HOROWITZ INDEX BLDV+IHG-RTO: 21 — SIGNIFICANT CHANGE UP
INR BLD: 0.94 RATIO — SIGNIFICANT CHANGE UP (ref 0.88–1.16)
LACTATE SERPL-SCNC: 1.1 MMOL/L — SIGNIFICANT CHANGE UP (ref 0.7–2)
LIDOCAIN IGE QN: 568 U/L — HIGH (ref 73–393)
LYMPHOCYTES # BLD AUTO: 2.1 K/UL — SIGNIFICANT CHANGE UP (ref 1–3.3)
LYMPHOCYTES # BLD AUTO: 20 % — SIGNIFICANT CHANGE UP (ref 13–44)
MCHC RBC-ENTMCNC: 31.2 PG — SIGNIFICANT CHANGE UP (ref 27–34)
MCHC RBC-ENTMCNC: 32.4 GM/DL — SIGNIFICANT CHANGE UP (ref 32–36)
MCV RBC AUTO: 96.1 FL — SIGNIFICANT CHANGE UP (ref 80–100)
MONOCYTES # BLD AUTO: 0.9 K/UL — SIGNIFICANT CHANGE UP (ref 0–0.9)
MONOCYTES NFR BLD AUTO: 8.7 % — SIGNIFICANT CHANGE UP (ref 2–14)
NEUTROPHILS # BLD AUTO: 6.8 K/UL — SIGNIFICANT CHANGE UP (ref 1.8–7.4)
NEUTROPHILS NFR BLD AUTO: 64.7 % — SIGNIFICANT CHANGE UP (ref 43–77)
PCO2 BLDV: 43 MMHG — SIGNIFICANT CHANGE UP (ref 35–50)
PH BLDV: 7.36 — SIGNIFICANT CHANGE UP (ref 7.35–7.45)
PLATELET # BLD AUTO: 368 K/UL — SIGNIFICANT CHANGE UP (ref 150–400)
PO2 BLDV: 125 MMHG — HIGH (ref 25–45)
POTASSIUM SERPL-MCNC: 5.1 MMOL/L — SIGNIFICANT CHANGE UP (ref 3.5–5.3)
POTASSIUM SERPL-SCNC: 5.1 MMOL/L — SIGNIFICANT CHANGE UP (ref 3.5–5.3)
PROT SERPL-MCNC: 8.2 G/DL — SIGNIFICANT CHANGE UP (ref 6–8.3)
PROTHROM AB SERPL-ACNC: 10.4 SEC — SIGNIFICANT CHANGE UP (ref 10–12.9)
RBC # BLD: 3.63 M/UL — LOW (ref 4.2–5.8)
RBC # FLD: 14.9 % — HIGH (ref 10.3–14.5)
SAO2 % BLDV: 99 % — HIGH (ref 67–88)
SODIUM SERPL-SCNC: 135 MMOL/L — SIGNIFICANT CHANGE UP (ref 135–145)
WBC # BLD: 10.5 K/UL — SIGNIFICANT CHANGE UP (ref 3.8–10.5)
WBC # FLD AUTO: 10.5 K/UL — SIGNIFICANT CHANGE UP (ref 3.8–10.5)

## 2018-12-28 PROCEDURE — 99285 EMERGENCY DEPT VISIT HI MDM: CPT | Mod: 25

## 2018-12-28 PROCEDURE — 99053 MED SERV 10PM-8AM 24 HR FAC: CPT

## 2018-12-28 RX ORDER — MORPHINE SULFATE 50 MG/1
4 CAPSULE, EXTENDED RELEASE ORAL ONCE
Qty: 0 | Refills: 0 | Status: DISCONTINUED | OUTPATIENT
Start: 2018-12-28 | End: 2018-12-28

## 2018-12-28 RX ORDER — MORPHINE SULFATE 50 MG/1
6 CAPSULE, EXTENDED RELEASE ORAL ONCE
Qty: 0 | Refills: 0 | Status: DISCONTINUED | OUTPATIENT
Start: 2018-12-28 | End: 2018-12-28

## 2018-12-28 RX ORDER — IOHEXOL 300 MG/ML
30 INJECTION, SOLUTION INTRAVENOUS ONCE
Qty: 0 | Refills: 0 | Status: COMPLETED | OUTPATIENT
Start: 2018-12-28 | End: 2018-12-28

## 2018-12-28 RX ORDER — ONDANSETRON 8 MG/1
4 TABLET, FILM COATED ORAL ONCE
Qty: 0 | Refills: 0 | Status: COMPLETED | OUTPATIENT
Start: 2018-12-28 | End: 2018-12-28

## 2018-12-28 RX ORDER — FAMOTIDINE 10 MG/ML
20 INJECTION INTRAVENOUS ONCE
Qty: 0 | Refills: 0 | Status: COMPLETED | OUTPATIENT
Start: 2018-12-28 | End: 2018-12-28

## 2018-12-28 RX ADMIN — FAMOTIDINE 20 MILLIGRAM(S): 10 INJECTION INTRAVENOUS at 21:16

## 2018-12-28 RX ADMIN — ONDANSETRON 4 MILLIGRAM(S): 8 TABLET, FILM COATED ORAL at 21:17

## 2018-12-28 RX ADMIN — IOHEXOL 30 MILLILITER(S): 300 INJECTION, SOLUTION INTRAVENOUS at 21:16

## 2018-12-28 RX ADMIN — MORPHINE SULFATE 6 MILLIGRAM(S): 50 CAPSULE, EXTENDED RELEASE ORAL at 21:18

## 2018-12-28 NOTE — ED PROVIDER NOTE - OBJECTIVE STATEMENT
37 y/o male with PMHx DM, HTN, obesity, ESRD (on dialysis; last one yesterday) presents to the ED with c/o 4 days of several episodes of non-bilious, non-bloody emesis and watery, non-black, non-bloody stool. Pt also notes bilateral lower abd pain and subjective fever. Pt had dialysis yesterday without complications. Pt denies prior episodes of these Sx, prior GI Hx, prior abd surgeries, or any urinary Sx.

## 2018-12-28 NOTE — ED PROVIDER NOTE - MEDICAL DECISION MAKING DETAILS
Pt with likely infectious gastroenteritis, but given tenderness must rule out colitis, diverticulitis, and appendicitis. Will order labs, pain control, CT A/P, and reassess.

## 2018-12-28 NOTE — ED PROVIDER NOTE - PROGRESS NOTE DETAILS
(Garcia) - assumed care from Dr. Meyer. Sign out to f/u CT and reassess. CT ordered changed to just PO contrast b/c CT tech is requesting pre medication for history of allergic reaction. CT - lymphadenopathy, but no other acute intra abdominal pathology   Pt requesting benzos prior to discharge for 'severe anxiety' despite pt sleeping comfortably. Pt also requesting dilaudid rx upon discharge, however he will need to f/u with his pain management MD. Discussed indications for patient return to ED. Patient understood.

## 2018-12-28 NOTE — ED PROVIDER NOTE - CLINICAL SUMMARY MEDICAL DECISION MAKING FREE TEXT BOX
pt signed out to dr. hernandez pending CT and reassessment as per his documentation pt was discharged with outpatient f/u and return precautions.

## 2018-12-29 VITALS
HEART RATE: 84 BPM | DIASTOLIC BLOOD PRESSURE: 83 MMHG | TEMPERATURE: 98 F | OXYGEN SATURATION: 100 % | RESPIRATION RATE: 17 BRPM | SYSTOLIC BLOOD PRESSURE: 139 MMHG

## 2018-12-29 PROCEDURE — 86900 BLOOD TYPING SEROLOGIC ABO: CPT

## 2018-12-29 PROCEDURE — 74176 CT ABD & PELVIS W/O CONTRAST: CPT | Mod: 26

## 2018-12-29 PROCEDURE — 83690 ASSAY OF LIPASE: CPT

## 2018-12-29 PROCEDURE — 85730 THROMBOPLASTIN TIME PARTIAL: CPT

## 2018-12-29 PROCEDURE — 99285 EMERGENCY DEPT VISIT HI MDM: CPT | Mod: 25

## 2018-12-29 PROCEDURE — 83605 ASSAY OF LACTIC ACID: CPT

## 2018-12-29 PROCEDURE — 86850 RBC ANTIBODY SCREEN: CPT

## 2018-12-29 PROCEDURE — 85610 PROTHROMBIN TIME: CPT

## 2018-12-29 PROCEDURE — 86901 BLOOD TYPING SEROLOGIC RH(D): CPT

## 2018-12-29 PROCEDURE — 74176 CT ABD & PELVIS W/O CONTRAST: CPT

## 2018-12-29 PROCEDURE — 82803 BLOOD GASES ANY COMBINATION: CPT

## 2018-12-29 PROCEDURE — 96374 THER/PROPH/DIAG INJ IV PUSH: CPT | Mod: XU

## 2018-12-29 PROCEDURE — 93005 ELECTROCARDIOGRAM TRACING: CPT

## 2018-12-29 PROCEDURE — 96376 TX/PRO/DX INJ SAME DRUG ADON: CPT

## 2018-12-29 PROCEDURE — 82962 GLUCOSE BLOOD TEST: CPT

## 2018-12-29 PROCEDURE — 85027 COMPLETE CBC AUTOMATED: CPT

## 2018-12-29 PROCEDURE — 96375 TX/PRO/DX INJ NEW DRUG ADDON: CPT

## 2018-12-29 PROCEDURE — 80053 COMPREHEN METABOLIC PANEL: CPT

## 2018-12-29 PROCEDURE — 36415 COLL VENOUS BLD VENIPUNCTURE: CPT

## 2018-12-29 RX ORDER — ALPRAZOLAM 0.25 MG
0.25 TABLET ORAL ONCE
Qty: 0 | Refills: 0 | Status: DISCONTINUED | OUTPATIENT
Start: 2018-12-29 | End: 2018-12-29

## 2018-12-29 RX ADMIN — MORPHINE SULFATE 4 MILLIGRAM(S): 50 CAPSULE, EXTENDED RELEASE ORAL at 00:22

## 2018-12-29 RX ADMIN — MORPHINE SULFATE 6 MILLIGRAM(S): 50 CAPSULE, EXTENDED RELEASE ORAL at 00:09

## 2018-12-29 RX ADMIN — MORPHINE SULFATE 4 MILLIGRAM(S): 50 CAPSULE, EXTENDED RELEASE ORAL at 03:17

## 2018-12-29 RX ADMIN — Medication 0.25 MILLIGRAM(S): at 03:21

## 2018-12-29 NOTE — ED ADULT NURSE REASSESSMENT NOTE - NS ED NURSE REASSESS COMMENT FT1
pt is a HD pt with left arm precaution has HD Tuesdays, Thursdays and Saturdays. went [facility] and left because he was not seen. pt was given morphine 6mg via iv without much relief. Morphine 4 mg pending.

## 2019-01-24 PROBLEM — Z00.00 ENCOUNTER FOR PREVENTIVE HEALTH EXAMINATION: Noted: 2019-01-24

## 2019-01-28 ENCOUNTER — EMERGENCY (EMERGENCY)
Facility: HOSPITAL | Age: 39
LOS: 1 days | Discharge: HOME | End: 2019-01-28
Attending: EMERGENCY MEDICINE
Payer: MEDICARE

## 2019-01-28 VITALS
HEART RATE: 98 BPM | OXYGEN SATURATION: 95 % | RESPIRATION RATE: 20 BRPM | WEIGHT: 315 LBS | DIASTOLIC BLOOD PRESSURE: 103 MMHG | TEMPERATURE: 99 F | HEIGHT: 78 IN | SYSTOLIC BLOOD PRESSURE: 140 MMHG

## 2019-01-28 DIAGNOSIS — Z98.89 OTHER SPECIFIED POSTPROCEDURAL STATES: Chronic | ICD-10-CM

## 2019-01-28 LAB
ALBUMIN SERPL ELPH-MCNC: 2.6 G/DL — LOW (ref 3.5–5)
ANION GAP SERPL CALC-SCNC: 15 MMOL/L — SIGNIFICANT CHANGE UP (ref 5–17)
APPEARANCE UR: CLEAR — SIGNIFICANT CHANGE UP
BASOPHILS # BLD AUTO: 0.1 K/UL — SIGNIFICANT CHANGE UP (ref 0–0.2)
BASOPHILS NFR BLD AUTO: 0.5 % — SIGNIFICANT CHANGE UP (ref 0–2)
BILIRUB UR-MCNC: NEGATIVE — SIGNIFICANT CHANGE UP
BUN SERPL-MCNC: 66 MG/DL — HIGH (ref 7–18)
CALCIUM SERPL-MCNC: 8.7 MG/DL — SIGNIFICANT CHANGE UP (ref 8.4–10.5)
CHLORIDE SERPL-SCNC: 102 MMOL/L — SIGNIFICANT CHANGE UP (ref 96–108)
CO2 SERPL-SCNC: 18 MMOL/L — LOW (ref 22–31)
COLOR SPEC: YELLOW — SIGNIFICANT CHANGE UP
DIFF PNL FLD: ABNORMAL
EOSINOPHIL # BLD AUTO: 0.3 K/UL — SIGNIFICANT CHANGE UP (ref 0–0.5)
EOSINOPHIL NFR BLD AUTO: 3.3 % — SIGNIFICANT CHANGE UP (ref 0–6)
GLUCOSE SERPL-MCNC: 110 MG/DL — HIGH (ref 70–99)
GLUCOSE UR QL: 250
HCT VFR BLD CALC: 33.3 % — LOW (ref 39–50)
HGB BLD-MCNC: 10.7 G/DL — LOW (ref 13–17)
KETONES UR-MCNC: NEGATIVE — SIGNIFICANT CHANGE UP
LEUKOCYTE ESTERASE UR-ACNC: NEGATIVE — SIGNIFICANT CHANGE UP
LIDOCAIN IGE QN: 203 U/L — SIGNIFICANT CHANGE UP (ref 73–393)
LYMPHOCYTES # BLD AUTO: 1.6 K/UL — SIGNIFICANT CHANGE UP (ref 1–3.3)
LYMPHOCYTES # BLD AUTO: 15.6 % — SIGNIFICANT CHANGE UP (ref 13–44)
MCHC RBC-ENTMCNC: 30.7 PG — SIGNIFICANT CHANGE UP (ref 27–34)
MCHC RBC-ENTMCNC: 32 GM/DL — SIGNIFICANT CHANGE UP (ref 32–36)
MCV RBC AUTO: 96 FL — SIGNIFICANT CHANGE UP (ref 80–100)
MONOCYTES # BLD AUTO: 0.9 K/UL — SIGNIFICANT CHANGE UP (ref 0–0.9)
MONOCYTES NFR BLD AUTO: 8.3 % — SIGNIFICANT CHANGE UP (ref 2–14)
NEUTROPHILS # BLD AUTO: 7.5 K/UL — HIGH (ref 1.8–7.4)
NEUTROPHILS NFR BLD AUTO: 72.4 % — SIGNIFICANT CHANGE UP (ref 43–77)
NITRITE UR-MCNC: NEGATIVE — SIGNIFICANT CHANGE UP
PH UR: 7 — SIGNIFICANT CHANGE UP (ref 5–8)
PLATELET # BLD AUTO: 401 K/UL — HIGH (ref 150–400)
POTASSIUM SERPL-MCNC: 5 MMOL/L — SIGNIFICANT CHANGE UP (ref 3.5–5.3)
POTASSIUM SERPL-SCNC: 5 MMOL/L — SIGNIFICANT CHANGE UP (ref 3.5–5.3)
PROT UR-MCNC: 500 MG/DL
RBC # BLD: 3.47 M/UL — LOW (ref 4.2–5.8)
RBC # FLD: 14.8 % — HIGH (ref 10.3–14.5)
SODIUM SERPL-SCNC: 135 MMOL/L — SIGNIFICANT CHANGE UP (ref 135–145)
SP GR SPEC: 1.01 — SIGNIFICANT CHANGE UP (ref 1.01–1.02)
UROBILINOGEN FLD QL: NEGATIVE — SIGNIFICANT CHANGE UP
WBC # BLD: 10.4 K/UL — SIGNIFICANT CHANGE UP (ref 3.8–10.5)
WBC # FLD AUTO: 10.4 K/UL — SIGNIFICANT CHANGE UP (ref 3.8–10.5)

## 2019-01-28 PROCEDURE — 99285 EMERGENCY DEPT VISIT HI MDM: CPT

## 2019-01-28 PROCEDURE — 93010 ELECTROCARDIOGRAM REPORT: CPT

## 2019-01-28 RX ORDER — PANTOPRAZOLE SODIUM 20 MG/1
40 TABLET, DELAYED RELEASE ORAL ONCE
Qty: 0 | Refills: 0 | Status: COMPLETED | OUTPATIENT
Start: 2019-01-28 | End: 2019-01-28

## 2019-01-28 RX ORDER — SODIUM CHLORIDE 9 MG/ML
1000 INJECTION INTRAMUSCULAR; INTRAVENOUS; SUBCUTANEOUS ONCE
Qty: 0 | Refills: 0 | Status: COMPLETED | OUTPATIENT
Start: 2019-01-28 | End: 2019-01-28

## 2019-01-28 RX ORDER — ONDANSETRON 8 MG/1
4 TABLET, FILM COATED ORAL ONCE
Qty: 0 | Refills: 0 | Status: COMPLETED | OUTPATIENT
Start: 2019-01-28 | End: 2019-01-28

## 2019-01-28 RX ORDER — IOHEXOL 300 MG/ML
30 INJECTION, SOLUTION INTRAVENOUS ONCE
Qty: 0 | Refills: 0 | Status: COMPLETED | OUTPATIENT
Start: 2019-01-28 | End: 2019-01-29

## 2019-01-28 RX ORDER — MORPHINE SULFATE 50 MG/1
4 CAPSULE, EXTENDED RELEASE ORAL ONCE
Qty: 0 | Refills: 0 | Status: DISCONTINUED | OUTPATIENT
Start: 2019-01-28 | End: 2019-01-28

## 2019-01-28 RX ORDER — SODIUM CHLORIDE 9 MG/ML
3 INJECTION INTRAMUSCULAR; INTRAVENOUS; SUBCUTANEOUS ONCE
Qty: 0 | Refills: 0 | Status: COMPLETED | OUTPATIENT
Start: 2019-01-28 | End: 2019-01-28

## 2019-01-28 RX ADMIN — SODIUM CHLORIDE 1000 MILLILITER(S): 9 INJECTION INTRAMUSCULAR; INTRAVENOUS; SUBCUTANEOUS at 22:30

## 2019-01-28 RX ADMIN — ONDANSETRON 4 MILLIGRAM(S): 8 TABLET, FILM COATED ORAL at 23:11

## 2019-01-28 RX ADMIN — PANTOPRAZOLE SODIUM 40 MILLIGRAM(S): 20 TABLET, DELAYED RELEASE ORAL at 23:10

## 2019-01-28 RX ADMIN — SODIUM CHLORIDE 3 MILLILITER(S): 9 INJECTION INTRAMUSCULAR; INTRAVENOUS; SUBCUTANEOUS at 23:11

## 2019-01-28 RX ADMIN — MORPHINE SULFATE 4 MILLIGRAM(S): 50 CAPSULE, EXTENDED RELEASE ORAL at 23:11

## 2019-01-28 NOTE — ED PROVIDER NOTE - MEDICAL DECISION MAKING DETAILS
39 y/o M pt presents with vomiting, diarrhea and abd pain. Will obtain EKG, labs, UA, CT abd, give morphine for pain, Zofran for nausea and reassess.

## 2019-01-28 NOTE — ED PROVIDER NOTE - OBJECTIVE STATEMENT
40 y/o M pt with a significant PMHx of ESRD (last dialysis last Thursday; pt only gets dialyzed on TuTh), DM and a significant PSHx of hernia repair presents to the ED c/o 2 days of vomiting, diarrhea and abd pain. Pt reports onset of symptoms yesterday evening after eating salad with homemade dressing; pt immediately started feeling unwell with vomiting and diarrhea (both non-bloody) with epigastric abd pain. Pt denies fever, chills or any other complaints. Allergies: Aspirin, Penicillin.

## 2019-01-28 NOTE — ED PROVIDER NOTE - PROGRESS NOTE DETAILS
labs and CT unremarkable  discussed above with patient. On reeval pt tolerating po w/o vomiting. patient reports he is scheduled for HD later today. Pt stable for discharge to f/u with PMD.

## 2019-01-29 VITALS
DIASTOLIC BLOOD PRESSURE: 91 MMHG | HEART RATE: 86 BPM | TEMPERATURE: 99 F | RESPIRATION RATE: 18 BRPM | OXYGEN SATURATION: 96 % | SYSTOLIC BLOOD PRESSURE: 133 MMHG

## 2019-01-29 LAB
ALP SERPL-CCNC: 97 U/L — SIGNIFICANT CHANGE UP (ref 40–120)
ALT FLD-CCNC: 15 U/L DA — SIGNIFICANT CHANGE UP (ref 10–60)
AST SERPL-CCNC: 16 U/L — SIGNIFICANT CHANGE UP (ref 10–40)
BILIRUB SERPL-MCNC: 0.3 MG/DL — SIGNIFICANT CHANGE UP (ref 0.2–1.2)
CREAT SERPL-MCNC: 12.2 MG/DL — HIGH (ref 0.5–1.3)
PROT SERPL-MCNC: 8 G/DL — SIGNIFICANT CHANGE UP (ref 6–8.3)

## 2019-01-29 PROCEDURE — 87086 URINE CULTURE/COLONY COUNT: CPT

## 2019-01-29 PROCEDURE — 74176 CT ABD & PELVIS W/O CONTRAST: CPT | Mod: 26

## 2019-01-29 PROCEDURE — 93005 ELECTROCARDIOGRAM TRACING: CPT

## 2019-01-29 PROCEDURE — 81001 URINALYSIS AUTO W/SCOPE: CPT

## 2019-01-29 PROCEDURE — 96375 TX/PRO/DX INJ NEW DRUG ADDON: CPT

## 2019-01-29 PROCEDURE — 99284 EMERGENCY DEPT VISIT MOD MDM: CPT | Mod: 25

## 2019-01-29 PROCEDURE — 83690 ASSAY OF LIPASE: CPT

## 2019-01-29 PROCEDURE — 74176 CT ABD & PELVIS W/O CONTRAST: CPT

## 2019-01-29 PROCEDURE — 96376 TX/PRO/DX INJ SAME DRUG ADON: CPT

## 2019-01-29 PROCEDURE — 36415 COLL VENOUS BLD VENIPUNCTURE: CPT

## 2019-01-29 PROCEDURE — 80053 COMPREHEN METABOLIC PANEL: CPT

## 2019-01-29 PROCEDURE — 96365 THER/PROPH/DIAG IV INF INIT: CPT

## 2019-01-29 PROCEDURE — 85027 COMPLETE CBC AUTOMATED: CPT

## 2019-01-29 RX ORDER — MORPHINE SULFATE 50 MG/1
4 CAPSULE, EXTENDED RELEASE ORAL ONCE
Qty: 0 | Refills: 0 | Status: DISCONTINUED | OUTPATIENT
Start: 2019-01-29 | End: 2019-01-29

## 2019-01-29 RX ORDER — ACETAMINOPHEN 500 MG
1000 TABLET ORAL ONCE
Qty: 0 | Refills: 0 | Status: COMPLETED | OUTPATIENT
Start: 2019-01-29 | End: 2019-01-29

## 2019-01-29 RX ADMIN — IOHEXOL 30 MILLILITER(S): 300 INJECTION, SOLUTION INTRAVENOUS at 02:49

## 2019-01-29 RX ADMIN — Medication 1000 MILLIGRAM(S): at 06:32

## 2019-01-29 RX ADMIN — Medication 1000 MILLIGRAM(S): at 06:33

## 2019-01-29 RX ADMIN — MORPHINE SULFATE 4 MILLIGRAM(S): 50 CAPSULE, EXTENDED RELEASE ORAL at 02:47

## 2019-01-29 RX ADMIN — MORPHINE SULFATE 4 MILLIGRAM(S): 50 CAPSULE, EXTENDED RELEASE ORAL at 03:09

## 2019-01-29 RX ADMIN — MORPHINE SULFATE 4 MILLIGRAM(S): 50 CAPSULE, EXTENDED RELEASE ORAL at 04:45

## 2019-01-29 RX ADMIN — SODIUM CHLORIDE 1000 MILLILITER(S): 9 INJECTION INTRAMUSCULAR; INTRAVENOUS; SUBCUTANEOUS at 05:29

## 2019-01-29 RX ADMIN — Medication 400 MILLIGRAM(S): at 05:27

## 2019-01-30 LAB
CULTURE RESULTS: NO GROWTH — SIGNIFICANT CHANGE UP
SPECIMEN SOURCE: SIGNIFICANT CHANGE UP

## 2019-02-01 ENCOUNTER — APPOINTMENT (OUTPATIENT)
Dept: ORTHOPEDIC SURGERY | Facility: CLINIC | Age: 39
End: 2019-02-01

## 2019-02-02 ENCOUNTER — INPATIENT (INPATIENT)
Facility: HOSPITAL | Age: 39
LOS: 2 days | Discharge: HOME | DRG: 640 | End: 2019-02-05
Attending: HOSPITALIST | Admitting: HOSPITALIST
Payer: MEDICARE

## 2019-02-02 VITALS
TEMPERATURE: 98 F | HEART RATE: 92 BPM | WEIGHT: 315 LBS | RESPIRATION RATE: 18 BRPM | HEIGHT: 78 IN | DIASTOLIC BLOOD PRESSURE: 96 MMHG | OXYGEN SATURATION: 99 % | SYSTOLIC BLOOD PRESSURE: 164 MMHG

## 2019-02-02 DIAGNOSIS — Z98.89 OTHER SPECIFIED POSTPROCEDURAL STATES: Chronic | ICD-10-CM

## 2019-02-02 DIAGNOSIS — N18.6 END STAGE RENAL DISEASE: ICD-10-CM

## 2019-02-02 LAB
ALBUMIN SERPL ELPH-MCNC: 3 G/DL — LOW (ref 3.5–5)
ALP SERPL-CCNC: 98 U/L — SIGNIFICANT CHANGE UP (ref 40–120)
ALT FLD-CCNC: 21 U/L DA — SIGNIFICANT CHANGE UP (ref 10–60)
ANION GAP SERPL CALC-SCNC: 14 MMOL/L — SIGNIFICANT CHANGE UP (ref 5–17)
AST SERPL-CCNC: 14 U/L — SIGNIFICANT CHANGE UP (ref 10–40)
BASOPHILS # BLD AUTO: 0.1 K/UL — SIGNIFICANT CHANGE UP (ref 0–0.2)
BASOPHILS NFR BLD AUTO: 1.2 % — SIGNIFICANT CHANGE UP (ref 0–2)
BILIRUB SERPL-MCNC: 0.3 MG/DL — SIGNIFICANT CHANGE UP (ref 0.2–1.2)
BUN SERPL-MCNC: 92 MG/DL — HIGH (ref 7–18)
CALCIUM SERPL-MCNC: 9.5 MG/DL — SIGNIFICANT CHANGE UP (ref 8.4–10.5)
CHLORIDE SERPL-SCNC: 108 MMOL/L — SIGNIFICANT CHANGE UP (ref 96–108)
CO2 SERPL-SCNC: 16 MMOL/L — LOW (ref 22–31)
CREAT SERPL-MCNC: 14.4 MG/DL — HIGH (ref 0.5–1.3)
EOSINOPHIL # BLD AUTO: 0.5 K/UL — SIGNIFICANT CHANGE UP (ref 0–0.5)
EOSINOPHIL NFR BLD AUTO: 4.2 % — SIGNIFICANT CHANGE UP (ref 0–6)
GLUCOSE SERPL-MCNC: 96 MG/DL — SIGNIFICANT CHANGE UP (ref 70–99)
HCT VFR BLD CALC: 34.1 % — LOW (ref 39–50)
HGB BLD-MCNC: 11 G/DL — LOW (ref 13–17)
LYMPHOCYTES # BLD AUTO: 2.8 K/UL — SIGNIFICANT CHANGE UP (ref 1–3.3)
LYMPHOCYTES # BLD AUTO: 23.4 % — SIGNIFICANT CHANGE UP (ref 13–44)
MCHC RBC-ENTMCNC: 31.3 PG — SIGNIFICANT CHANGE UP (ref 27–34)
MCHC RBC-ENTMCNC: 32.2 GM/DL — SIGNIFICANT CHANGE UP (ref 32–36)
MCV RBC AUTO: 97.3 FL — SIGNIFICANT CHANGE UP (ref 80–100)
MONOCYTES # BLD AUTO: 0.8 K/UL — SIGNIFICANT CHANGE UP (ref 0–0.9)
MONOCYTES NFR BLD AUTO: 6.9 % — SIGNIFICANT CHANGE UP (ref 2–14)
NEUTROPHILS # BLD AUTO: 7.6 K/UL — HIGH (ref 1.8–7.4)
NEUTROPHILS NFR BLD AUTO: 64.3 % — SIGNIFICANT CHANGE UP (ref 43–77)
PLATELET # BLD AUTO: 404 K/UL — HIGH (ref 150–400)
POTASSIUM SERPL-MCNC: 6.1 MMOL/L — HIGH (ref 3.5–5.3)
POTASSIUM SERPL-SCNC: 6.1 MMOL/L — HIGH (ref 3.5–5.3)
PROT SERPL-MCNC: 8.1 G/DL — SIGNIFICANT CHANGE UP (ref 6–8.3)
RBC # BLD: 3.51 M/UL — LOW (ref 4.2–5.8)
RBC # FLD: 15.4 % — HIGH (ref 10.3–14.5)
SODIUM SERPL-SCNC: 138 MMOL/L — SIGNIFICANT CHANGE UP (ref 135–145)
WBC # BLD: 11.8 K/UL — HIGH (ref 3.8–10.5)
WBC # FLD AUTO: 11.8 K/UL — HIGH (ref 3.8–10.5)

## 2019-02-02 PROCEDURE — 99285 EMERGENCY DEPT VISIT HI MDM: CPT

## 2019-02-02 PROCEDURE — 74176 CT ABD & PELVIS W/O CONTRAST: CPT | Mod: 26

## 2019-02-02 PROCEDURE — 99223 1ST HOSP IP/OBS HIGH 75: CPT

## 2019-02-02 PROCEDURE — 71045 X-RAY EXAM CHEST 1 VIEW: CPT | Mod: 26

## 2019-02-02 PROCEDURE — 93010 ELECTROCARDIOGRAM REPORT: CPT

## 2019-02-02 RX ORDER — OXYCODONE AND ACETAMINOPHEN 5; 325 MG/1; MG/1
1 TABLET ORAL ONCE
Qty: 0 | Refills: 0 | Status: DISCONTINUED | OUTPATIENT
Start: 2019-02-02 | End: 2019-02-02

## 2019-02-02 RX ORDER — CALCIUM GLUCONATE 100 MG/ML
1 VIAL (ML) INTRAVENOUS ONCE
Qty: 0 | Refills: 0 | Status: COMPLETED | OUTPATIENT
Start: 2019-02-02 | End: 2019-02-02

## 2019-02-02 RX ORDER — ONDANSETRON 8 MG/1
4 TABLET, FILM COATED ORAL ONCE
Qty: 0 | Refills: 0 | Status: COMPLETED | OUTPATIENT
Start: 2019-02-02 | End: 2019-02-02

## 2019-02-02 RX ORDER — MORPHINE SULFATE 50 MG/1
4 CAPSULE, EXTENDED RELEASE ORAL ONCE
Qty: 0 | Refills: 0 | Status: DISCONTINUED | OUTPATIENT
Start: 2019-02-02 | End: 2019-02-02

## 2019-02-02 RX ORDER — SODIUM POLYSTYRENE SULFONATE 4.1 MEQ/G
15 POWDER, FOR SUSPENSION ORAL ONCE
Qty: 0 | Refills: 0 | Status: COMPLETED | OUTPATIENT
Start: 2019-02-02 | End: 2019-02-02

## 2019-02-02 RX ADMIN — MORPHINE SULFATE 4 MILLIGRAM(S): 50 CAPSULE, EXTENDED RELEASE ORAL at 22:45

## 2019-02-02 RX ADMIN — ONDANSETRON 4 MILLIGRAM(S): 8 TABLET, FILM COATED ORAL at 20:38

## 2019-02-02 RX ADMIN — Medication 200 GRAM(S): at 22:45

## 2019-02-02 RX ADMIN — SODIUM POLYSTYRENE SULFONATE 15 GRAM(S): 4.1 POWDER, FOR SUSPENSION ORAL at 22:46

## 2019-02-02 NOTE — H&P ADULT - NSHPSOCIALHISTORY_GEN_ALL_CORE
active smoker, smokes marijuana, denies alcohol use active smoker with no desire to quit smoking, approximately 2-3 packs per day for the last 15 years;, smokes marijuana, last use 2 days prior to admission;, denies alcohol use

## 2019-02-02 NOTE — H&P ADULT - HISTORY OF PRESENT ILLNESS
37 y/o M patient, on dialysis (TTS), with a significant PMHx of DM, Bipolar, schizophrenia, ESRD, morbid obesity and significant PSHx of history of hernia repair presents to the ED with nausea, vomiting, and having missed dialysis for the past week. Patient last received dialysis on 1/24, and missed subsequent sessions due to  not showing up due to some confusion on  process. Patient reports having had nausea and vomiting for the last week, only able to keep down solid foods. Patient denies fever, however admits to diarrheal episode 1 week ago, and last has bowel movement on 2/2 which was loose, however not watery. Patient also admits to some periumbilical and LLQ tenderness. All other ROS including chest pain and shortness of breath are negative.

## 2019-02-02 NOTE — H&P ADULT - PROBLEM SELECTOR PLAN 1
missed dialysis for the past week  hyperkalemic to 6.1, no peaked T waves on EKG  not in fluid overload clinically  plan for dialysis in AM  f/u nephrology - Dr. La

## 2019-02-02 NOTE — H&P ADULT - ASSESSMENT
Patient admitted for nausea, vomiting, abdominal pain, s/p 1 week of missed dialysis, found to be hyperkalemic to 6.1 without any peaked T waves on EKG.

## 2019-02-02 NOTE — H&P ADULT - NSHPLABSRESULTS_GEN_ALL_CORE
CBC Full  -  ( 02 Feb 2019 20:58 )  WBC Count : 11.8 K/uL  Hemoglobin : 11.0 g/dL  Hematocrit : 34.1 %  Platelet Count - Automated : 404 K/uL  Mean Cell Volume : 97.3 fl  Mean Cell Hemoglobin : 31.3 pg  Mean Cell Hemoglobin Concentration : 32.2 gm/dL  Auto Neutrophil # : 7.6 K/uL  Auto Lymphocyte # : 2.8 K/uL  Auto Monocyte # : 0.8 K/uL  Auto Eosinophil # : 0.5 K/uL  Auto Basophil # : 0.1 K/uL  Auto Neutrophil % : 64.3 %  Auto Lymphocyte % : 23.4 %  Auto Monocyte % : 6.9 %  Auto Eosinophil % : 4.2 %  Auto Basophil % : 1.2 %    02-02    138  |  108  |  92<H>  ----------------------------<  96  6.1<H>   |  16<L>  |  14.40<H>    Ca    9.5      02 Feb 2019 20:58    TPro  8.1  /  Alb  3.0<L>  /  TBili  0.3  /  DBili  x   /  AST  14  /  ALT  21  /  AlkPhos  98  02-02      RADIOLOGY & ADDITIONAL STUDIES (The following images were personally reviewed): CBC Full  -  ( 02 Feb 2019 20:58 )  WBC Count : 11.8 K/uL  Hemoglobin : 11.0 g/dL  Hematocrit : 34.1 %  Platelet Count - Automated : 404 K/uL  Mean Cell Volume : 97.3 fl  Mean Cell Hemoglobin : 31.3 pg  Mean Cell Hemoglobin Concentration : 32.2 gm/dL  Auto Neutrophil # : 7.6 K/uL  Auto Lymphocyte # : 2.8 K/uL  Auto Monocyte # : 0.8 K/uL  Auto Eosinophil # : 0.5 K/uL  Auto Basophil # : 0.1 K/uL  Auto Neutrophil % : 64.3 %  Auto Lymphocyte % : 23.4 %  Auto Monocyte % : 6.9 %  Auto Eosinophil % : 4.2 %  Auto Basophil % : 1.2 %    02-02    138  |  108  |  92<H>  ----------------------------<  96  6.1<H>   |  16<L>  |  14.40<H>    Ca    9.5      02 Feb 2019 20:58    TPro  8.1  /  Alb  3.0<L>  /  TBili  0.3  /  DBili  x   /  AST  14  /  ALT  21  /  AlkPhos  98  02-02      RADIOLOGY & ADDITIONAL STUDIES (The following images were personally reviewed):      EXAM:  CT ABDOMEN AND PELVIS                            PROCEDURE DATE:  02/02/2019          INTERPRETATION:  CLINICAL INFORMATION: Nausea and abdominal pain.    TECHNIQUE: Noncontrast CT of the abdomen and pelvis was performed with   coronal and sagittal reformats. No oral contrast.    COMPARISON: CT abdomen and pelvis 1/29/2019.    FINDINGS:  Assessment of solid organs and viscera is limited without intravenous   contrast enhancement. Patient contact with the CT gantry demonstrates   artifact limiting evaluation of the left lateral abdomen.    LOWER CHEST: Within normal limits.    HEPATOBILIARY: 1.1 cm nodule ventral to the liver (2-17), possibly a   lymph node, stable when compared to 4/4/2016. Liver, gallbladder and bile   ducts within normal limits.  PANCREAS: Within normal limits.  SPLEEN: Within normal limits.  ADRENALS: Within normal limits.    KIDNEYS/URETERS/BLADDER: Within normal limits.  REPRODUCTIVE ORGANS: Within normal limits.    BOWEL/PERITONEUM:  No bowel obstruction, inflammation or   pneumoperitoneum.  Normal appendix. Portions of the gastrointestinal   tract are collapsed, limiting evaluation.     VESSELS:  Within normal limits.  LYMPHATICS/RETROPERITONEUM: No lymphadenopathy. No retroperitoneal   hematoma.      SOFT TISSUES: Within normal limits.  BONES: Within normal limits.    IMPRESSION: No acute findings or interval change since 1/29/2019.   Unchanged 1.1 cm nodule ventral to the liver, possibly a lymph node and   likely of doubtful clinical significance given stability since 4/4/2016.

## 2019-02-02 NOTE — ED PROVIDER NOTE - PMH
Bipolar disorder    Diabetes    ESRD on dialysis    Morbid obesity with BMI of 40.0-44.9, adult    Schizophrenia

## 2019-02-02 NOTE — H&P ADULT - PROBLEM SELECTOR PLAN 7
IMPROVE VTE Individual Risk Assessment          RISK                                                          Points  [  ] Previous VTE                                                3  [  ] Thrombophilia                                             2  [  ] Lower limb paralysis                                   2        (unable to hold up >15 seconds)    [  ] Current Cancer                                             2         (within 6 months)  [ x ] Immobilization > 24 hrs                              1  [  ] ICU/CCU stay > 24 hours                             1  [  ] Age > 60                                                         1    IMPROVE VTE Score: 1    c/w heparin for vte prophylaxis

## 2019-02-02 NOTE — H&P ADULT - PROBLEM SELECTOR PLAN 6
nutrition assessment  pt has chronic back pain, on PO dilaudid at home  c/w IV dilaudid until patient able to tolerate PO

## 2019-02-02 NOTE — H&P ADULT - PROBLEM SELECTOR PLAN 2
c/w zofran for now (as reglan has interactions with schizophrenia and bipolar medications)  CT abdomen/pelvis negative for any acute pathology  may be esophageal pathology vs hyperemesis cannabinoid vs diabetic gastroparesis?  clear liquid diet for patient, advance diet as tolerated

## 2019-02-02 NOTE — H&P ADULT - ATTENDING COMMENTS
Patient seen and examined, admitted with nausea, vomiting, having missed dialysis due to transportation issues.   Noted to have hyperkalemia, EKG show no peaked T waves, patient clinically not fluid overloaded.   s/p Akiko oxalate given in the ed.   Renal aware, patient to have dialysis am tomorrow.  Continue with home meds.  DM continue with lantus and Humalog. Follow up HbA1C.

## 2019-02-02 NOTE — ED PROVIDER NOTE - PROGRESS NOTE DETAILS
patient missed 3 HD sessions, admit for dialysis tomorrow. K slightly elevated, no EKG changed, will give kayexelate and calcium. no evidence of fluid overload. complaining of chronic leg pain, will give percocet

## 2019-02-02 NOTE — ED PROVIDER NOTE - OBJECTIVE STATEMENT
Received forms again. (Duplicate of original, with today's date.)  Placed in MA's mailbox, if needed.   37 y/o M patient, on dialysis (T, R, S), with a significant PMHx of DM, Bipolar, schizophrenia, ESRD, morbid obesity and significant PSHx of history of hernia repair presents to the ED with c/o missing dialysis x 1 week. Patient's last dialysis was last Thursday. Patient missed each subsequent session was due to his  not showing up. Patient reports being slightly nauseous, having chest pain. Allergies: Aspirin, penicillin, shellfish.

## 2019-02-02 NOTE — H&P ADULT - NSHPPHYSICALEXAM_GEN_ALL_CORE
Vital Signs Last 24 Hrs  T(C): 36.7 (02 Feb 2019 17:33), Max: 36.7 (02 Feb 2019 17:33)  T(F): 98 (02 Feb 2019 17:33), Max: 98 (02 Feb 2019 17:33)  HR: 92 (02 Feb 2019 17:33) (92 - 92)  BP: 164/96 (02 Feb 2019 17:33) (164/96 - 164/96)  RR: 18 (02 Feb 2019 17:33) (18 - 18)  SpO2: 99% (02 Feb 2019 17:33) (99% - 99%)  ________________________________________________  PHYSICAL EXAM:  GENERAL: NAD,morbidly obese  HEENT: Normocephalic;  conjunctivae and sclerae clear; dry mucous membranes;   NECK : supple, no JVD  CHEST/LUNG: Clear to auscultation bilaterally with good air entry   HEART: S1 S2  regular; no murmurs, gallops or rubs  ABDOMEN: Soft, tenderness in epigastric and umbilical areas along with LLQ tenderness; distended; Bowel sounds present  EXTREMITIES: no cyanosis; no edema; no calf tenderness; LUE AV fistula +thrill + bruit  SKIN: multiple tattoos on upper and lower extremities  NERVOUS SYSTEM:  Awake and alert; Oriented  to place, person and time, no focal neurologic deficits

## 2019-02-03 ENCOUNTER — TRANSCRIPTION ENCOUNTER (OUTPATIENT)
Age: 39
End: 2019-02-03

## 2019-02-03 DIAGNOSIS — F20.9 SCHIZOPHRENIA, UNSPECIFIED: ICD-10-CM

## 2019-02-03 DIAGNOSIS — Z29.9 ENCOUNTER FOR PROPHYLACTIC MEASURES, UNSPECIFIED: ICD-10-CM

## 2019-02-03 DIAGNOSIS — F31.9 BIPOLAR DISORDER, UNSPECIFIED: ICD-10-CM

## 2019-02-03 DIAGNOSIS — R11.2 NAUSEA WITH VOMITING, UNSPECIFIED: ICD-10-CM

## 2019-02-03 DIAGNOSIS — E66.01 MORBID (SEVERE) OBESITY DUE TO EXCESS CALORIES: ICD-10-CM

## 2019-02-03 DIAGNOSIS — R07.89 OTHER CHEST PAIN: ICD-10-CM

## 2019-02-03 DIAGNOSIS — N18.6 END STAGE RENAL DISEASE: ICD-10-CM

## 2019-02-03 DIAGNOSIS — E11.9 TYPE 2 DIABETES MELLITUS WITHOUT COMPLICATIONS: ICD-10-CM

## 2019-02-03 LAB
ANION GAP SERPL CALC-SCNC: 11 MMOL/L — SIGNIFICANT CHANGE UP (ref 5–17)
ANION GAP SERPL CALC-SCNC: 12 MMOL/L — SIGNIFICANT CHANGE UP (ref 5–17)
BUN SERPL-MCNC: 52 MG/DL — HIGH (ref 7–18)
BUN SERPL-MCNC: 98 MG/DL — HIGH (ref 7–18)
CALCIUM SERPL-MCNC: 9.2 MG/DL — SIGNIFICANT CHANGE UP (ref 8.4–10.5)
CALCIUM SERPL-MCNC: 9.7 MG/DL — SIGNIFICANT CHANGE UP (ref 8.4–10.5)
CHLORIDE SERPL-SCNC: 101 MMOL/L — SIGNIFICANT CHANGE UP (ref 96–108)
CHLORIDE SERPL-SCNC: 107 MMOL/L — SIGNIFICANT CHANGE UP (ref 96–108)
CO2 SERPL-SCNC: 17 MMOL/L — LOW (ref 22–31)
CO2 SERPL-SCNC: 24 MMOL/L — SIGNIFICANT CHANGE UP (ref 22–31)
CREAT SERPL-MCNC: 14.7 MG/DL — HIGH (ref 0.5–1.3)
CREAT SERPL-MCNC: 9.51 MG/DL — HIGH (ref 0.5–1.3)
GLUCOSE SERPL-MCNC: 120 MG/DL — HIGH (ref 70–99)
GLUCOSE SERPL-MCNC: 170 MG/DL — HIGH (ref 70–99)
HCT VFR BLD CALC: 33.4 % — LOW (ref 39–50)
HCT VFR BLD CALC: 33.8 % — LOW (ref 39–50)
HGB BLD-MCNC: 10.8 G/DL — LOW (ref 13–17)
HGB BLD-MCNC: 10.8 G/DL — LOW (ref 13–17)
MAGNESIUM SERPL-MCNC: 2.3 MG/DL — SIGNIFICANT CHANGE UP (ref 1.6–2.6)
MCHC RBC-ENTMCNC: 31.1 PG — SIGNIFICANT CHANGE UP (ref 27–34)
MCHC RBC-ENTMCNC: 31.3 PG — SIGNIFICANT CHANGE UP (ref 27–34)
MCHC RBC-ENTMCNC: 31.9 GM/DL — LOW (ref 32–36)
MCHC RBC-ENTMCNC: 32.4 GM/DL — SIGNIFICANT CHANGE UP (ref 32–36)
MCV RBC AUTO: 96.3 FL — SIGNIFICANT CHANGE UP (ref 80–100)
MCV RBC AUTO: 97.4 FL — SIGNIFICANT CHANGE UP (ref 80–100)
PHOSPHATE SERPL-MCNC: 9.5 MG/DL — HIGH (ref 2.5–4.5)
PLATELET # BLD AUTO: 367 K/UL — SIGNIFICANT CHANGE UP (ref 150–400)
PLATELET # BLD AUTO: 373 K/UL — SIGNIFICANT CHANGE UP (ref 150–400)
POTASSIUM SERPL-MCNC: 4.2 MMOL/L — SIGNIFICANT CHANGE UP (ref 3.5–5.3)
POTASSIUM SERPL-MCNC: 6.2 MMOL/L — CRITICAL HIGH (ref 3.5–5.3)
POTASSIUM SERPL-SCNC: 4.2 MMOL/L — SIGNIFICANT CHANGE UP (ref 3.5–5.3)
POTASSIUM SERPL-SCNC: 6.2 MMOL/L — CRITICAL HIGH (ref 3.5–5.3)
RBC # BLD: 3.46 M/UL — LOW (ref 4.2–5.8)
RBC # BLD: 3.47 M/UL — LOW (ref 4.2–5.8)
RBC # FLD: 15.3 % — HIGH (ref 10.3–14.5)
RBC # FLD: 15.5 % — HIGH (ref 10.3–14.5)
SODIUM SERPL-SCNC: 136 MMOL/L — SIGNIFICANT CHANGE UP (ref 135–145)
SODIUM SERPL-SCNC: 136 MMOL/L — SIGNIFICANT CHANGE UP (ref 135–145)
TROPONIN I SERPL-MCNC: <0.015 NG/ML — SIGNIFICANT CHANGE UP (ref 0–0.04)
WBC # BLD: 10.6 K/UL — HIGH (ref 3.8–10.5)
WBC # BLD: 8.9 K/UL — SIGNIFICANT CHANGE UP (ref 3.8–10.5)
WBC # FLD AUTO: 10.6 K/UL — HIGH (ref 3.8–10.5)
WBC # FLD AUTO: 8.9 K/UL — SIGNIFICANT CHANGE UP (ref 3.8–10.5)

## 2019-02-03 PROCEDURE — 99233 SBSQ HOSP IP/OBS HIGH 50: CPT

## 2019-02-03 PROCEDURE — 71045 X-RAY EXAM CHEST 1 VIEW: CPT | Mod: 26

## 2019-02-03 RX ORDER — PANTOPRAZOLE SODIUM 20 MG/1
40 TABLET, DELAYED RELEASE ORAL DAILY
Qty: 0 | Refills: 0 | Status: DISCONTINUED | OUTPATIENT
Start: 2019-02-03 | End: 2019-02-05

## 2019-02-03 RX ORDER — GABAPENTIN 400 MG/1
400 CAPSULE ORAL
Qty: 0 | Refills: 0 | Status: DISCONTINUED | OUTPATIENT
Start: 2019-02-03 | End: 2019-02-05

## 2019-02-03 RX ORDER — QUETIAPINE FUMARATE 200 MG/1
200 TABLET, FILM COATED ORAL DAILY
Qty: 0 | Refills: 0 | Status: DISCONTINUED | OUTPATIENT
Start: 2019-02-03 | End: 2019-02-04

## 2019-02-03 RX ORDER — ESCITALOPRAM OXALATE 10 MG/1
10 TABLET, FILM COATED ORAL DAILY
Qty: 0 | Refills: 0 | Status: DISCONTINUED | OUTPATIENT
Start: 2019-02-03 | End: 2019-02-05

## 2019-02-03 RX ORDER — HYDROMORPHONE HYDROCHLORIDE 2 MG/ML
0.5 INJECTION INTRAMUSCULAR; INTRAVENOUS; SUBCUTANEOUS EVERY 4 HOURS
Qty: 0 | Refills: 0 | Status: DISCONTINUED | OUTPATIENT
Start: 2019-02-03 | End: 2019-02-03

## 2019-02-03 RX ORDER — CINACALCET 30 MG/1
30 TABLET, FILM COATED ORAL DAILY
Qty: 0 | Refills: 0 | Status: DISCONTINUED | OUTPATIENT
Start: 2019-02-03 | End: 2019-02-05

## 2019-02-03 RX ORDER — HYDROMORPHONE HYDROCHLORIDE 2 MG/ML
0.5 INJECTION INTRAMUSCULAR; INTRAVENOUS; SUBCUTANEOUS ONCE
Qty: 0 | Refills: 0 | Status: DISCONTINUED | OUTPATIENT
Start: 2019-02-03 | End: 2019-02-03

## 2019-02-03 RX ORDER — SUCROFERRIC OXYHYDROXIDE 500 MG/1
1 TABLET, CHEWABLE ORAL
Qty: 0 | Refills: 0 | COMMUNITY

## 2019-02-03 RX ORDER — MIRTAZAPINE 45 MG/1
45 TABLET, ORALLY DISINTEGRATING ORAL DAILY
Qty: 0 | Refills: 0 | Status: DISCONTINUED | OUTPATIENT
Start: 2019-02-03 | End: 2019-02-05

## 2019-02-03 RX ORDER — SEVELAMER CARBONATE 2400 MG/1
800 POWDER, FOR SUSPENSION ORAL
Qty: 0 | Refills: 0 | Status: DISCONTINUED | OUTPATIENT
Start: 2019-02-03 | End: 2019-02-05

## 2019-02-03 RX ORDER — ACETAMINOPHEN 500 MG
650 TABLET ORAL ONCE
Qty: 0 | Refills: 0 | Status: DISCONTINUED | OUTPATIENT
Start: 2019-02-03 | End: 2019-02-03

## 2019-02-03 RX ORDER — NICOTINE POLACRILEX 2 MG
1 GUM BUCCAL DAILY
Qty: 0 | Refills: 0 | Status: DISCONTINUED | OUTPATIENT
Start: 2019-02-03 | End: 2019-02-05

## 2019-02-03 RX ORDER — POLYETHYLENE GLYCOL 3350 17 G/17G
17 POWDER, FOR SOLUTION ORAL DAILY
Qty: 0 | Refills: 0 | Status: DISCONTINUED | OUTPATIENT
Start: 2019-02-03 | End: 2019-02-05

## 2019-02-03 RX ORDER — QUETIAPINE FUMARATE 200 MG/1
200 TABLET, FILM COATED ORAL DAILY
Qty: 0 | Refills: 0 | Status: DISCONTINUED | OUTPATIENT
Start: 2019-02-03 | End: 2019-02-03

## 2019-02-03 RX ORDER — INSULIN LISPRO 100/ML
5 VIAL (ML) SUBCUTANEOUS
Qty: 0 | Refills: 0 | Status: DISCONTINUED | OUTPATIENT
Start: 2019-02-03 | End: 2019-02-05

## 2019-02-03 RX ORDER — ONDANSETRON 8 MG/1
4 TABLET, FILM COATED ORAL EVERY 4 HOURS
Qty: 0 | Refills: 0 | Status: DISCONTINUED | OUTPATIENT
Start: 2019-02-03 | End: 2019-02-05

## 2019-02-03 RX ORDER — SIMVASTATIN 20 MG/1
40 TABLET, FILM COATED ORAL AT BEDTIME
Qty: 0 | Refills: 0 | Status: DISCONTINUED | OUTPATIENT
Start: 2019-02-03 | End: 2019-02-05

## 2019-02-03 RX ORDER — AMLODIPINE BESYLATE 2.5 MG/1
5 TABLET ORAL DAILY
Qty: 0 | Refills: 0 | Status: DISCONTINUED | OUTPATIENT
Start: 2019-02-03 | End: 2019-02-05

## 2019-02-03 RX ORDER — INSULIN GLARGINE 100 [IU]/ML
15 INJECTION, SOLUTION SUBCUTANEOUS AT BEDTIME
Qty: 0 | Refills: 0 | Status: DISCONTINUED | OUTPATIENT
Start: 2019-02-03 | End: 2019-02-05

## 2019-02-03 RX ORDER — HEPARIN SODIUM 5000 [USP'U]/ML
5000 INJECTION INTRAVENOUS; SUBCUTANEOUS EVERY 12 HOURS
Qty: 0 | Refills: 0 | Status: DISCONTINUED | OUTPATIENT
Start: 2019-02-03 | End: 2019-02-05

## 2019-02-03 RX ORDER — HYDROMORPHONE HYDROCHLORIDE 2 MG/ML
2 INJECTION INTRAMUSCULAR; INTRAVENOUS; SUBCUTANEOUS
Qty: 0 | Refills: 0 | Status: DISCONTINUED | OUTPATIENT
Start: 2019-02-03 | End: 2019-02-05

## 2019-02-03 RX ORDER — ACETAMINOPHEN 500 MG
650 TABLET ORAL ONCE
Qty: 0 | Refills: 0 | Status: COMPLETED | OUTPATIENT
Start: 2019-02-03 | End: 2019-02-03

## 2019-02-03 RX ADMIN — Medication 5 UNIT(S): at 18:16

## 2019-02-03 RX ADMIN — HYDROMORPHONE HYDROCHLORIDE 0.5 MILLIGRAM(S): 2 INJECTION INTRAMUSCULAR; INTRAVENOUS; SUBCUTANEOUS at 03:22

## 2019-02-03 RX ADMIN — QUETIAPINE FUMARATE 200 MILLIGRAM(S): 200 TABLET, FILM COATED ORAL at 22:26

## 2019-02-03 RX ADMIN — SIMVASTATIN 40 MILLIGRAM(S): 20 TABLET, FILM COATED ORAL at 22:26

## 2019-02-03 RX ADMIN — HYDROMORPHONE HYDROCHLORIDE 0.5 MILLIGRAM(S): 2 INJECTION INTRAMUSCULAR; INTRAVENOUS; SUBCUTANEOUS at 05:42

## 2019-02-03 RX ADMIN — GABAPENTIN 400 MILLIGRAM(S): 400 CAPSULE ORAL at 06:27

## 2019-02-03 RX ADMIN — HYDROMORPHONE HYDROCHLORIDE 0.5 MILLIGRAM(S): 2 INJECTION INTRAMUSCULAR; INTRAVENOUS; SUBCUTANEOUS at 20:00

## 2019-02-03 RX ADMIN — ONDANSETRON 4 MILLIGRAM(S): 8 TABLET, FILM COATED ORAL at 03:34

## 2019-02-03 RX ADMIN — HEPARIN SODIUM 5000 UNIT(S): 5000 INJECTION INTRAVENOUS; SUBCUTANEOUS at 18:16

## 2019-02-03 RX ADMIN — HYDROMORPHONE HYDROCHLORIDE 0.5 MILLIGRAM(S): 2 INJECTION INTRAMUSCULAR; INTRAVENOUS; SUBCUTANEOUS at 09:13

## 2019-02-03 RX ADMIN — HYDROMORPHONE HYDROCHLORIDE 0.5 MILLIGRAM(S): 2 INJECTION INTRAMUSCULAR; INTRAVENOUS; SUBCUTANEOUS at 19:04

## 2019-02-03 RX ADMIN — HEPARIN SODIUM 5000 UNIT(S): 5000 INJECTION INTRAVENOUS; SUBCUTANEOUS at 06:27

## 2019-02-03 RX ADMIN — SEVELAMER CARBONATE 800 MILLIGRAM(S): 2400 POWDER, FOR SUSPENSION ORAL at 09:14

## 2019-02-03 RX ADMIN — ONDANSETRON 4 MILLIGRAM(S): 8 TABLET, FILM COATED ORAL at 19:14

## 2019-02-03 RX ADMIN — MORPHINE SULFATE 4 MILLIGRAM(S): 50 CAPSULE, EXTENDED RELEASE ORAL at 03:13

## 2019-02-03 RX ADMIN — Medication 650 MILLIGRAM(S): at 12:30

## 2019-02-03 RX ADMIN — HYDROMORPHONE HYDROCHLORIDE 0.5 MILLIGRAM(S): 2 INJECTION INTRAMUSCULAR; INTRAVENOUS; SUBCUTANEOUS at 22:26

## 2019-02-03 RX ADMIN — Medication 650 MILLIGRAM(S): at 11:50

## 2019-02-03 RX ADMIN — HYDROMORPHONE HYDROCHLORIDE 0.5 MILLIGRAM(S): 2 INJECTION INTRAMUSCULAR; INTRAVENOUS; SUBCUTANEOUS at 23:30

## 2019-02-03 RX ADMIN — HYDROMORPHONE HYDROCHLORIDE 0.5 MILLIGRAM(S): 2 INJECTION INTRAMUSCULAR; INTRAVENOUS; SUBCUTANEOUS at 10:30

## 2019-02-03 NOTE — DISCHARGE NOTE ADULT - MEDICATION SUMMARY - MEDICATIONS TO TAKE
I will START or STAY ON the medications listed below when I get home from the hospital:    Dilaudid 2 mg oral tablet  -- 1 tab(s) by mouth every 6 hours, As Needed MDD:4   -- Caution federal law prohibits the transfer of this drug to any person other  than the person for whom it was prescribed.  May cause drowsiness.  Alcohol may intensify this effect.  Use care when operating dangerous machinery.  This prescription cannot be refilled.  Using more of this medication than prescribed may cause serious breathing problems.    -- Indication: For pain    Neurontin 400 mg oral capsule  -- 1 cap(s) by mouth 2 times a day  -- Indication: For Neuropathy    mirtazapine 45 mg oral tablet  -- 1 tab(s) by mouth once a day  -- Indication: For Insomnia    escitalopram 10 mg oral tablet  -- 1 tab(s) by mouth once a day  -- Indication: For Depression    NovoLOG 100 units/mL subcutaneous solution  -- 5 unit(s) subcutaneous 3 times a day  -- Indication: For Diabetes    Lantus 100 units/mL subcutaneous solution  -- 15 unit(s) subcutaneous once a day (at bedtime)  -- Indication: For Diabetes    simvastatin 40 mg oral tablet  -- 1 tab(s) by mouth once a day (at bedtime)  -- Indication: For HLD    QUEtiapine 100 mg oral tablet  -- 2 tab(s) by mouth once a day  -- Indication: For Bipolar disorder    Sensipar 30 mg oral tablet  -- 1 tab(s) by mouth once a day  -- Indication: For ESRD on dialysis    amLODIPine 5 mg oral tablet  -- 1 tab(s) by mouth once a day  -- Indication: For htn    raNITIdine 150 mg oral tablet  -- 1 tab(s) by mouth 2 times a day, As Needed  -- Indication: For Intractable vomiting with nausea    polyethylene glycol 3350 oral powder for reconstitution  -- 17 gram(s) by mouth once a day  -- Indication: For OIC    Renvela 800 mg oral tablet  -- 2 tab(s) by mouth 3 times a day (with meals)  -- Indication: For ESRD on dialysis    omeprazole 20 mg oral delayed release capsule  -- 1 cap(s) by mouth once a day  -- Indication: For Need for prophylactic measure    Nicoderm C-Q 21 mg/24 hr transdermal film, extended release  -- 1 patch transdermally once a day   -- Do not take this drug if you are pregnant.  For external use only.  It is very important that you take or use this exactly as directed.  Do not skip doses or discontinue unless directed by your doctor.  Remove old patch prior to applying a new patch.    -- Indication: For tobacco cessation    Nephro-Oly oral tablet  -- 1 tab(s) by mouth once a day  -- Indication: For ESRD on dialysis I will START or STAY ON the medications listed below when I get home from the hospital:    Dilaudid 2 mg oral tablet  -- 1 tab(s) by mouth every 6 hours, As Needed MDD:4   -- Caution federal law prohibits the transfer of this drug to any person other  than the person for whom it was prescribed.  May cause drowsiness.  Alcohol may intensify this effect.  Use care when operating dangerous machinery.  This prescription cannot be refilled.  Using more of this medication than prescribed may cause serious breathing problems.    -- Indication: For Back pain    Neurontin 400 mg oral capsule  -- 1 cap(s) by mouth 2 times a day  -- Indication: For Neuropathy    mirtazapine 45 mg oral tablet  -- 1 tab(s) by mouth once a day  -- Indication: For Insomnia    escitalopram 10 mg oral tablet  -- 1 tab(s) by mouth once a day  -- Indication: For Depression    NovoLOG 100 units/mL subcutaneous solution  -- 5 unit(s) subcutaneous 3 times a day  -- Indication: For Diabetes    Lantus 100 units/mL subcutaneous solution  -- 15 unit(s) subcutaneous once a day (at bedtime)  -- Indication: For Diabetes    simvastatin 40 mg oral tablet  -- 1 tab(s) by mouth once a day (at bedtime)  -- Indication: For HLD    QUEtiapine 100 mg oral tablet  -- 2 tab(s) by mouth once a day  -- Indication: For Bipolar disorder    Sensipar 30 mg oral tablet  -- 1 tab(s) by mouth once a day  -- Indication: For ESRD on dialysis    amLODIPine 5 mg oral tablet  -- 1 tab(s) by mouth once a day  -- Indication: For htn    raNITIdine 150 mg oral tablet  -- 1 tab(s) by mouth 2 times a day, As Needed  -- Indication: For Intractable vomiting with nausea    polyethylene glycol 3350 oral powder for reconstitution  -- 17 gram(s) by mouth once a day  -- Indication: For OIC    Renvela 800 mg oral tablet  -- 2 tab(s) by mouth 3 times a day (with meals)  -- Indication: For ESRD on dialysis    omeprazole 20 mg oral delayed release capsule  -- 1 cap(s) by mouth once a day  -- Indication: For Need for prophylactic measure    Nicoderm C-Q 21 mg/24 hr transdermal film, extended release  -- 1 patch transdermally once a day   -- Do not take this drug if you are pregnant.  For external use only.  It is very important that you take or use this exactly as directed.  Do not skip doses or discontinue unless directed by your doctor.  Remove old patch prior to applying a new patch.    -- Indication: For tobacco cessation    Nephro-Oly oral tablet  -- 1 tab(s) by mouth once a day  -- Indication: For ESRD on dialysis I will START or STAY ON the medications listed below when I get home from the hospital:    Neurontin 400 mg oral capsule  -- 1 cap(s) by mouth 2 times a day  -- Indication: For Neuropathy    mirtazapine 45 mg oral tablet  -- 1 tab(s) by mouth once a day  -- Indication: For Insomnia    escitalopram 10 mg oral tablet  -- 1 tab(s) by mouth once a day  -- Indication: For Depression    NovoLOG 100 units/mL subcutaneous solution  -- 5 unit(s) subcutaneous 3 times a day  -- Indication: For Diabetes    Lantus 100 units/mL subcutaneous solution  -- 15 unit(s) subcutaneous once a day (at bedtime)  -- Indication: For Diabetes    simvastatin 40 mg oral tablet  -- 1 tab(s) by mouth once a day (at bedtime)  -- Indication: For HLD    doxycycline hyclate 100 mg oral capsule  -- 1 cap(s) by mouth 2 times a day   -- Avoid prolonged or excessive exposure to direct and/or artificial sunlight while taking this medication.  Do not take this drug if you are pregnant.  Finish all this medication unless otherwise directed by prescriber.  Medication should be taken with plenty of water.    -- Indication: For Cellulitis    QUEtiapine 100 mg oral tablet  -- 2 tab(s) by mouth once a day  -- Indication: For Bipolar disorder    Sensipar 30 mg oral tablet  -- 1 tab(s) by mouth once a day  -- Indication: For ESRD on dialysis    amLODIPine 5 mg oral tablet  -- 1 tab(s) by mouth once a day  -- Indication: For htn    raNITIdine 150 mg oral tablet  -- 1 tab(s) by mouth 2 times a day, As Needed  -- Indication: For Intractable vomiting with nausea    polyethylene glycol 3350 oral powder for reconstitution  -- 17 gram(s) by mouth once a day  -- Indication: For OIC    Renvela 800 mg oral tablet  -- 2 tab(s) by mouth 3 times a day (with meals)  -- Indication: For ESRD on dialysis    omeprazole 20 mg oral delayed release capsule  -- 1 cap(s) by mouth once a day  -- Indication: For Need for prophylactic measure    Nicoderm C-Q 21 mg/24 hr transdermal film, extended release  -- 1 patch transdermally once a day   -- Do not take this drug if you are pregnant.  For external use only.  It is very important that you take or use this exactly as directed.  Do not skip doses or discontinue unless directed by your doctor.  Remove old patch prior to applying a new patch.    -- Indication: For tobacco cessation    Nephro-Oly oral tablet  -- 1 tab(s) by mouth once a day  -- Indication: For ESRD on dialysis I will START or STAY ON the medications listed below when I get home from the hospital:    Neurontin 400 mg oral capsule  -- 1 cap(s) by mouth 2 times a day  -- Indication: For Neuropathy    mirtazapine 45 mg oral tablet  -- 1 tab(s) by mouth once a day  -- Indication: For Insomnia    escitalopram 10 mg oral tablet  -- 1 tab(s) by mouth once a day  -- Indication: For Depression    NovoLOG 100 units/mL subcutaneous solution  -- 5 unit(s) subcutaneous 3 times a day  -- Indication: For Diabetes    Lantus 100 units/mL subcutaneous solution  -- 15 unit(s) subcutaneous once a day (at bedtime)  -- Indication: For Diabetes    simvastatin 40 mg oral tablet  -- 1 tab(s) by mouth once a day (at bedtime)  -- Indication: For HLD    doxycycline hyclate 100 mg oral capsule  -- 1 cap(s) by mouth 2 times a day   -- Avoid prolonged or excessive exposure to direct and/or artificial sunlight while taking this medication.  Do not take this drug if you are pregnant.  Finish all this medication unless otherwise directed by prescriber.  Medication should be taken with plenty of water.    -- Indication: For Cellulitis    doxycycline hyclate 100 mg oral capsule  -- 1 cap(s) by mouth 2 times a day   -- Avoid prolonged or excessive exposure to direct and/or artificial sunlight while taking this medication.  Do not take this drug if you are pregnant.  Finish all this medication unless otherwise directed by prescriber.  Medication should be taken with plenty of water.    -- Indication: For Cellulitis    QUEtiapine 100 mg oral tablet  -- 2 tab(s) by mouth once a day  -- Indication: For Bipolar disorder    Sensipar 30 mg oral tablet  -- 1 tab(s) by mouth once a day  -- Indication: For ESRD on dialysis    amLODIPine 5 mg oral tablet  -- 1 tab(s) by mouth once a day  -- Indication: For htn    raNITIdine 150 mg oral tablet  -- 1 tab(s) by mouth 2 times a day, As Needed  -- Indication: For Intractable vomiting with nausea    polyethylene glycol 3350 oral powder for reconstitution  -- 17 gram(s) by mouth once a day  -- Indication: For OIC    Renvela 800 mg oral tablet  -- 2 tab(s) by mouth 3 times a day (with meals)  -- Indication: For ESRD on dialysis    omeprazole 20 mg oral delayed release capsule  -- 1 cap(s) by mouth once a day  -- Indication: For Need for prophylactic measure    Nicoderm C-Q 21 mg/24 hr transdermal film, extended release  -- 1 patch transdermally once a day   -- Do not take this drug if you are pregnant.  For external use only.  It is very important that you take or use this exactly as directed.  Do not skip doses or discontinue unless directed by your doctor.  Remove old patch prior to applying a new patch.    -- Indication: For tobacco cessation    Nephro-Oly oral tablet  -- 1 tab(s) by mouth once a day  -- Indication: For ESRD on dialysis

## 2019-02-03 NOTE — PROGRESS NOTE ADULT - PROBLEM SELECTOR PLAN 2
Monitor on tele and trend down cardiac enzymes. EKG no changes.   Cxr show fluid overload from missed dialysis. Renal aware, plan for dialysis.

## 2019-02-03 NOTE — CHART NOTE - NSCHARTNOTEFT_GEN_A_CORE
Patient became short of breath early this morning at approximately 6:30 AM and complained of cough with accompanied chest pain. Ordered STAT BMP, troponin, and repeat CXR. CXR results came back showing fluid overload state. Patient placed on 3L NC, saturating comfortably for now. Patient is pending early AM dialysis, Dr. La made aware. BMP and troponin results still pending.

## 2019-02-03 NOTE — DISCHARGE NOTE ADULT - PATIENT PORTAL LINK FT
You can access the [facility] Patient Portal, offered by [facility], by registering with the following website: [email or web address]

## 2019-02-03 NOTE — DISCHARGE NOTE ADULT - PLAN OF CARE
patient will remain symptoms free Follow up with primary physician Attend dialysis session on 2/4/2019 continue medications, diet continue medications Attend dialysis session on 2/5/2019. SW to set up. continue Lantus insulin and 5 units between meals continue medications as prior to admission continue  Seroquel medications resolved You had vomiting on admission with abdominal pain. CT scan of your abdomen did not show any pathology. You have been able to tolerate meals. Follow up with primary physician Attend dialysis sessions as scheduled. You were seen by our  who helped establish your care hemoglobin A1C<7 resolution You have cellulitis of left middle finger for which you are being started on doxycyline 100mg two times a day for 7 days. Follow up with your primary doctor to monitor your progress.

## 2019-02-03 NOTE — ED ADULT NURSE NOTE - ED STAT RN HANDOFF DETAILS
pt.remained   stable.  c/o slight  chest pain when coughing.  aware,  denies  chest pain at this  time.   endorsed  to   ivone. pt. not  in distress

## 2019-02-03 NOTE — DISCHARGE NOTE ADULT - CARE PLAN
Principal Discharge DX:	Intractable vomiting with nausea  Goal:	patient will remain symptoms free  Assessment and plan of treatment:	Follow up with primary physician  Secondary Diagnosis:	ESRD on dialysis  Assessment and plan of treatment:	Attend dialysis session on 2/4/2019  Secondary Diagnosis:	Diabetes  Assessment and plan of treatment:	continue medications, diet  Secondary Diagnosis:	Schizophrenia  Assessment and plan of treatment:	continue medications  Secondary Diagnosis:	Bipolar disorder  Assessment and plan of treatment:	continue medications Principal Discharge DX:	Intractable vomiting with nausea  Goal:	patient will remain symptoms free  Assessment and plan of treatment:	Follow up with primary physician  Secondary Diagnosis:	ESRD on dialysis  Assessment and plan of treatment:	Attend dialysis session on 2/5/2019. SW to set up.  Secondary Diagnosis:	Diabetes  Assessment and plan of treatment:	continue Lantus insulin and 5 units between meals  Secondary Diagnosis:	Schizophrenia  Assessment and plan of treatment:	continue medications as prior to admission  Secondary Diagnosis:	Bipolar disorder  Assessment and plan of treatment:	continue  Seroquel medications Principal Discharge DX:	Intractable vomiting with nausea  Goal:	resolved  Assessment and plan of treatment:	You had vomiting on admission with abdominal pain. CT scan of your abdomen did not show any pathology. You have been able to tolerate meals. Follow up with primary physician  Secondary Diagnosis:	ESRD on dialysis  Assessment and plan of treatment:	Attend dialysis sessions as scheduled. You were seen by our  who helped establish your care  Secondary Diagnosis:	Diabetes  Goal:	hemoglobin A1C<7  Assessment and plan of treatment:	continue Lantus insulin and 5 units between meals  Secondary Diagnosis:	Schizophrenia  Assessment and plan of treatment:	continue medications as prior to admission  Secondary Diagnosis:	Bipolar disorder  Assessment and plan of treatment:	continue  Seroquel medications  Secondary Diagnosis:	Cellulitis of finger of left hand  Goal:	resolution  Assessment and plan of treatment:	You have cellulitis of left middle finger for which you are being started on doxycyline 100mg two times a day for 7 days. Follow up with your primary doctor to monitor your progress.

## 2019-02-03 NOTE — CONSULT NOTE ADULT - ASSESSMENT
# ESRD- Will plan for emergent this morning. discussed compliance wit prescribed dialysis treatment.  # HTN controlled.   # hyperkalemia sec to missed HD. HD AGAINST A 2.0 k+ BATH   #CKDMBD cont sensipar, renvela. Check phos and PTH with next labs  # anemia of CKD. stable off procrit  # DM2 cont lantus    Many thanks for the consult.

## 2019-02-03 NOTE — DISCHARGE NOTE ADULT - MEDICATION SUMMARY - MEDICATIONS TO STOP TAKING
I will STOP taking the medications listed below when I get home from the hospital:    amLODIPine 2.5 mg oral tablet  -- 1 tab(s) by mouth once a day (at bedtime) I will STOP taking the medications listed below when I get home from the hospital:    amLODIPine 2.5 mg oral tablet  -- 1 tab(s) by mouth once a day (at bedtime)    Dilaudid 2 mg oral tablet  -- 1 tab(s) by mouth every 6 hours, As Needed MDD:4   -- Caution federal law prohibits the transfer of this drug to any person other  than the person for whom it was prescribed.  May cause drowsiness.  Alcohol may intensify this effect.  Use care when operating dangerous machinery.  This prescription cannot be refilled.  Using more of this medication than prescribed may cause serious breathing problems.

## 2019-02-03 NOTE — ED ADULT NURSE NOTE - NSIMPLEMENTINTERV_GEN_ALL_ED
Implemented All Universal Safety Interventions:  [place] to call system. Call bell, personal items and telephone within reach. Instruct patient to call for assistance. Room bathroom lighting operational. Non-slip footwear when patient is off stretcher. Physically safe environment: no spills, clutter or unnecessary equipment. Stretcher in lowest position, wheels locked, appropriate side rails in place.

## 2019-02-03 NOTE — DISCHARGE NOTE ADULT - HOSPITAL COURSE
39 y/o M patient, on dialysis (TTS), with a significant PMHx of DM, Bipolar, schizophrenia, ESRD, morbid obesity and significant PSHx of history of hernia repair presents to the ED with nausea, vomiting, and having missed dialysis for the past week. Patient last received dialysis on 1/24, and missed subsequent sessions due to  not showing up due to some confusion on  process. Patient reports having had nausea and vomiting for the last week, only able to keep down solid foods. Patient denies fever, however admits to diarrheal episode 1 week ago, and last has bowel movement on 2/2 which was loose, however not watery. Patient also admits to some periumbilical and LLQ tenderness. All other ROS including chest pain and shortness of breath are negative.   		  Patient admitted for nausea, vomiting, abdominal pain, s/p 1 week of missed dialysis, found to be hyperkalemic to 6.1 without any peaked T waves on EKG.     ESRD on dialysis.    missed dialysis for the past week  hyperkalemic to 6.1, no peaked T waves on EKG  Chest xray- < from: Xray Chest 1 View- PORTABLE-Urgent (02.03.19 @ 07:07) >  Impression: Mild pulmonary vascular congestion.    < end of copied text   s/p dialysis today,  s/p renal consult Dr. La   Chest pain, atypical.     Monitor on tele. Trop1 neg. EKG no changes.   Cxr show fluid overload from missed dialysis.  s/p dialysis      Intractable vomiting with nausea.    c/w zofran for now (as reglan has interactions with schizophrenia and bipolar medications)  CT abdomen/pelvis negative for any acute pathology  may be esophageal pathology vs hyperemesis cannabinoid vs diabetic gastroparesis?  clear liquid diet for patient, advance diet as tolerated.     Schizophrenia.     c/w mirtazapine, quetiapine.     Bipolar disorder.     c/w escitalopram.      Diabetes.   c/w lantus and humalog at home doses  c/w sliding scale  f/u HbA1c, fingersticks  consistent carb diet.     Morbid obesity with BMI of 40.0-44.9, adult.    nutrition assessment  pt has chronic back pain, on PO dilaudid at home  c/w IV dilaudid until patient able to tolerate PO.      Need for prophylactic measure.     IMPROVE VTE Individual Risk Assessment          RISK                                                          Points  [  ] Previous VTE                                                3  [  ] Thrombophilia                                             2  [  ] Lower limb paralysis                                   2        (unable to hold up >15 seconds)    [  ] Current Cancer                                             2         (within 6 months)  [ x ] Immobilization > 24 hrs                              1  [  ] ICU/CCU stay > 24 hours                             1  [  ] Age > 60                                                         1    IMPROVE VTE Score: 1    s/p heparin for vte prophylaxis.     Patient stable for discharge as per attending 39 y/o M patient, on dialysis (TTS), with a significant PMHx of DM, Bipolar, schizophrenia, ESRD, morbid obesity and significant PSHx of history of hernia repair presents to the ED with nausea, vomiting, and having missed dialysis for the past week. Patient last received dialysis on 1/24, and missed subsequent sessions due to  not showing up due to some confusion on  process. Patient reports having had nausea and vomiting for the last week, only able to keep down solid foods. Patient denies fever, however admits to diarrheal episode 1 week ago, and last has bowel movement on 2/2 which was loose, however not watery. Patient also admits to some periumbilical and LLQ tenderness. All other ROS including chest pain and shortness of breath are negative.   		  Patient admitted for nausea, vomiting, abdominal pain, s/p 1 week of missed dialysis, found to be hyperkalemic to 6.1 without any peaked T waves on EKG.     ESRD on dialysis.    missed dialysis for the past week  hyperkalemic to 6.1, no peaked T waves on EKG  Pt now toleration po diet. Pt to be discharged to home. OUTPT HD reinstated  Chest xray- < from: Xray Chest 1 View- PORTABLE-Urgent (02.03.19 @ 07:07) >  Impression: Mild pulmonary vascular congestion.      s/p dialysis 2/3,  s/p renal consult Dr. La   Chest pain, atypical.     Monitor on tele. Trop1 neg. EKG no changes.   Cxr show fluid overload from missed dialysis.  s/p dialysis      Intractable vomiting with nausea.    c/w zofran for now (as reglan has interactions with schizophrenia and bipolar medications)  CT abdomen/pelvis negative for any acute pathology  may be esophageal pathology vs hyperemesis cannabinoid vs diabetic gastroparesis?  clear liquid diet for patient, advance diet as tolerated.     Schizophrenia.     c/w mirtazapine, quetiapine.     Bipolar disorder.     c/w escitalopram.      Diabetes.   c/w lantus and humalog at home doses  c/w sliding scale  f/u HbA1c, fingersticks  consistent carb diet.     Morbid obesity with BMI of 40.0-44.9, adult.    nutrition assessment  pt has chronic back pain, on PO dilaudid at home  c/w IV dilaudid until patient able to tolerate PO.      Need for prophylactic measure.     IMPROVE VTE Individual Risk Assessment          RISK                                                          Points  [  ] Previous VTE                                                3  [  ] Thrombophilia                                             2  [  ] Lower limb paralysis                                   2        (unable to hold up >15 seconds)    [  ] Current Cancer                                             2         (within 6 months)  [ x ] Immobilization > 24 hrs                              1  [  ] ICU/CCU stay > 24 hours                             1  [  ] Age > 60                                                         1    IMPROVE VTE Score: 1    s/p heparin for vte prophylaxis.     Patient stable for discharge as per attending 37 y/o M patient, on dialysis (TTS), with a significant PMHx of DM, Bipolar, schizophrenia, ESRD, morbid obesity and significant PSHx of history of hernia repair presents to the ED with nausea, vomiting, and having missed dialysis for the past week. Patient last received dialysis on 1/24, and missed subsequent sessions due to  not showing up due to some confusion on  process. Patient reports having had nausea and vomiting for the last week, only able to keep down solid foods. Patient denies fever, however admits to diarrheal episode 1 week ago, and last has bowel movement on 2/2 which was loose, however not watery. Patient also admits to some periumbilical and LLQ tenderness. All other ROS including chest pain and shortness of breath are negative.   		  Patient admitted for nausea, vomiting, abdominal pain, s/p 1 week of missed dialysis, found to be hyperkalemic to 6.1 without any peaked T waves on EKG.     ESRD on dialysis.    missed dialysis for the past week  hyperkalemic to 6.1, no peaked T waves on EKG  Pt now toleration po diet. Pt to be discharged to home. OUTPT HD reinstated         Intractable vomiting with nausea.    c/w zofran for now (as reglan has interactions with schizophrenia and bipolar medications)  CT abdomen/pelvis negative for any acute pathology  pa able to tolerate regular diet. Likely from bowel wall edema from fluid overload 2/2 missed HD  pt also has chronic back pain, not to be given any opioid medications as per pain management.       Also started on doxycyline 100mg bid for 7 days for cellulitis      Patient stable for discharge as per attending

## 2019-02-03 NOTE — PROGRESS NOTE ADULT - PROBLEM SELECTOR PLAN 6
c/w lantus and humalog at home doses  c/w sliding scale  f/u HbA1c, fingersticks  consistent carb diet

## 2019-02-03 NOTE — CONSULT NOTE ADULT - SUBJECTIVE AND OBJECTIVE BOX
[facility] : Progress Note :: [phone], (office [phone]),   Dr La / Dr Hui / Dr Barber / Dr Villalobos / Dr Hang CASTELLANO / Dr Mehta / Dr Sanchez / Dr Alber dumont  _____________________________________________________________________________________________  Patient is a 38y Male with a H/O ESRD on HD TTS at [facility] ( very non-compliant with HD. HAd last HD last week0. Also with HTN, morbid obesity, schizophrenia, DM2. Presented to ED last night with shortness of breath. Found with hyperkalemia and pulmonary edema.  Renal consulted to evaluate PT for emergent HD.     PAST MEDICAL & SURGICAL HISTORY:  Bipolar disorder  Schizophrenia  ESRD on dialysis  Morbid obesity with BMI of 40.0-44.9, adult  Diabetes  H/O hernia repair    aspirin (Swelling)  penicillins (Swelling)  shellfish (Unknown)    Home Medications Reviewed  Hospital Medications:   MEDICATIONS  (STANDING):  cinacalcet 30 milliGRAM(s) Oral daily  escitalopram 10 milliGRAM(s) Oral daily  gabapentin 400 milliGRAM(s) Oral two times a day  heparin  Injectable 5000 Unit(s) SubCutaneous every 12 hours  insulin glargine Injectable (LANTUS) 15 Unit(s) SubCutaneous at bedtime  insulin lispro Injectable (HumaLOG) 5 Unit(s) SubCutaneous three times a day with meals  mirtazapine 45 milliGRAM(s) Oral daily  Nephro-mauricio 1 Tablet(s) Oral daily  nicotine - 21 mG/24Hr(s) Patch 1 patch Transdermal daily  pantoprazole  Injectable 40 milliGRAM(s) IV Push daily  polyethylene glycol 3350 17 Gram(s) Oral daily  QUEtiapine 200 milliGRAM(s) Oral daily  sevelamer hydrochloride 800 milliGRAM(s) Oral three times a day with meals  simvastatin 40 milliGRAM(s) Oral at bedtime    SOCIAL HISTORY:  Denies ETOh,Smoking,   FAMILY HISTORY:  Family history of diabetes mellitus (Mother)        VITALS:  T(F): 97.9 (19 @ 07:11), Max: 98.5 (19 @ 01:55)  HR: 74 (19 @ 07:11)  BP: 134/86 (19 @ 07:11)  RR: 20 (19 @ 07:11)  SpO2: 100% (19 @ 07:11)  Wt(kg): --    Height (cm): 200.66 ( @ 17:33)  Weight (kg): 167.8 ( @ 17:33)  BMI (kg/m2): 41.7 ( @ :33)  BSA (m2): 2.96 ( @ :33)  PHYSICAL EXAM:  Constitutional: NAD  HEENT: anicteric sclera, oropharynx clear.  Neck: No JVD  Respiratory: wheezes, rales+ or rhonchi+  Cardiovascular: S1, S2, RRR  Gastrointestinal: BS+, soft, NT/ND  Extremities: peripheral edema++  Neurological: A/O x 3, no focal deficits  Vascular Access: LUEAVF with trhill and bruit    LABS:      138  |  108  |  92<H>  ----------------------------<  96  6.1<H>   |  16<L>  |  14.40<H>    Ca    9.5      2019 20:58    TPro  8.1  /  Alb  3.0<L>  /  TBili  0.3  /  DBili      /  AST  14  /  ALT  21  /  AlkPhos  98      Creatinine Trend: 14.40 <--, 12.20 <--                        11.0   11.8  )-----------( 404      ( 2019 20:58 )             34.1     Urine Studies:  Urinalysis Basic - ( 2019 23:34 )    Color: Yellow / Appearance: Clear / S.010 / pH:   Gluc:  / Ketone: Negative  / Bili: Negative / Urobili: Negative   Blood:  / Protein: 500 mg/dL / Nitrite: Negative   Leuk Esterase: Negative / RBC: 0-2 /HPF / WBC 0-2 /HPF   Sq Epi:  / Non Sq Epi: Negative /HPF / Bacteria: Negative /HPF        RADIOLOGY & ADDITIONAL STUDIES:

## 2019-02-03 NOTE — DISCHARGE NOTE ADULT - CARE PROVIDER_API CALL
Isac La)  Internal Medicine; Nephrology  [facility] [address] [place] [address]  Phone: [phone]  Fax: [phone]

## 2019-02-03 NOTE — PROGRESS NOTE ADULT - PROBLEM SELECTOR PLAN 1
missed dialysis for the past week  hyperkalemic to 6.1, no peaked T waves on EKG  not in fluid overload clinically  plan for dialysis today, renal consult appreciated.   Continue to follow.

## 2019-02-03 NOTE — PROGRESS NOTE ADULT - SUBJECTIVE AND OBJECTIVE BOX
Patient is a 38y old  Male who presents with a chief complaint of nausea, vomiting, missed dialysis x 1 week (03 Feb 2019 09:24)      SUBJECTIVE / OVERNIGHT EVENTS: Patient had an episode of chest pain and short of breath this morning, felt better with Oxygen, other wise feels ok. Currently denies any pain. No events over night.       T(C): 36.6 (02-03-19 @ 07:11), Max: 36.9 (02-03-19 @ 01:55)  HR: 74 (02-03-19 @ 07:11) (73 - 80)  BP: 134/86 (02-03-19 @ 07:11) (134/86 - 178/109)  RR: 20 (02-03-19 @ 07:11) (20 - 22)  SpO2: 100% (02-03-19 @ 07:11) (98% - 100%)      - 10)  ondansetron Injectable 4 milliGRAM(s) IV Push every 4 hours PRN Nausea and/or Vomiting      CAPILLARY BLOOD GLUCOSE      CAPILLARY BLOOD GLUCOSE      POCT Blood Glucose.: 90 mg/dL (03 Feb 2019 07:48)  I&O's Summary    T(C): 36.6 (02-03-19 @ 07:11), Max: 36.9 (02-03-19 @ 01:55)  HR: 74 (02-03-19 @ 07:11) (73 - 80)  BP: 134/86 (02-03-19 @ 07:11) (134/86 - 178/109)  RR: 20 (02-03-19 @ 07:11) (20 - 22)  SpO2: 100% (02-03-19 @ 07:11) (98% - 100%)    PHYSICAL EXAM:  GENERAL: NAD, well-developed  HEAD:  Atraumatic, Normocephalic  EYES: EOMI, PERRLA, conjunctiva and sclera clear  NECK: Supple, No JVD  CHEST/LUNG: Clear to auscultation bilaterally; No wheeze  HEART: Regular rate and rhythm; No murmurs, rubs, or gallops  ABDOMEN: Soft, Nontender, Nondistended; Bowel sounds present  EXTREMITIES:  2+ Peripheral Pulses, No clubbing, cyanosis, or edema  PSYCH: AAOx3  NEUROLOGY: non-focal  SKIN: No rashes or lesions                                          10.8   10.6  )-----------( 373      ( 03 Feb 2019 10:55 )             33.8           LIVER FUNCTIONS - ( 02 Feb 2019 20:58 )  Alb: 3.0 g/dL / Pro: 8.1 g/dL / ALK PHOS: 98 U/L / ALT: 21 U/L DA / AST: 14 U/L / GGT: x             --|--|--<--  --|--|--  --,2.3,--  02-03 @ 10:55  138|108|92<96  6.1|16|14.40  9.5,--,--  02-02 @ 20:58          RADIOLOGY & ADDITIONAL TESTS:    Imaging Personally Reviewed:    Consultant(s) Notes Reviewed:      Care Discussed with Consultants/Other Providers:

## 2019-02-04 LAB
ANION GAP SERPL CALC-SCNC: 11 MMOL/L — SIGNIFICANT CHANGE UP (ref 5–17)
BUN SERPL-MCNC: 63 MG/DL — HIGH (ref 7–18)
CALCIUM SERPL-MCNC: 8.7 MG/DL — SIGNIFICANT CHANGE UP (ref 8.4–10.5)
CHLORIDE SERPL-SCNC: 100 MMOL/L — SIGNIFICANT CHANGE UP (ref 96–108)
CO2 SERPL-SCNC: 24 MMOL/L — SIGNIFICANT CHANGE UP (ref 22–31)
CREAT SERPL-MCNC: 11.4 MG/DL — HIGH (ref 0.5–1.3)
GLUCOSE SERPL-MCNC: 167 MG/DL — HIGH (ref 70–99)
HCT VFR BLD CALC: 33.6 % — LOW (ref 39–50)
HGB BLD-MCNC: 10.7 G/DL — LOW (ref 13–17)
MAGNESIUM SERPL-MCNC: 2.2 MG/DL — SIGNIFICANT CHANGE UP (ref 1.6–2.6)
MCHC RBC-ENTMCNC: 31.1 PG — SIGNIFICANT CHANGE UP (ref 27–34)
MCHC RBC-ENTMCNC: 32 GM/DL — SIGNIFICANT CHANGE UP (ref 32–36)
MCV RBC AUTO: 97.3 FL — SIGNIFICANT CHANGE UP (ref 80–100)
PHOSPHATE SERPL-MCNC: 8.7 MG/DL — HIGH (ref 2.5–4.5)
PLATELET # BLD AUTO: 338 K/UL — SIGNIFICANT CHANGE UP (ref 150–400)
POTASSIUM SERPL-MCNC: 4.7 MMOL/L — SIGNIFICANT CHANGE UP (ref 3.5–5.3)
POTASSIUM SERPL-SCNC: 4.7 MMOL/L — SIGNIFICANT CHANGE UP (ref 3.5–5.3)
RBC # BLD: 3.45 M/UL — LOW (ref 4.2–5.8)
RBC # FLD: 15.4 % — HIGH (ref 10.3–14.5)
SODIUM SERPL-SCNC: 135 MMOL/L — SIGNIFICANT CHANGE UP (ref 135–145)
WBC # BLD: 7.6 K/UL — SIGNIFICANT CHANGE UP (ref 3.8–10.5)
WBC # FLD AUTO: 7.6 K/UL — SIGNIFICANT CHANGE UP (ref 3.8–10.5)

## 2019-02-04 PROCEDURE — 99233 SBSQ HOSP IP/OBS HIGH 50: CPT | Mod: GC

## 2019-02-04 RX ORDER — HYDROMORPHONE HYDROCHLORIDE 2 MG/ML
1 INJECTION INTRAMUSCULAR; INTRAVENOUS; SUBCUTANEOUS ONCE
Qty: 0 | Refills: 0 | Status: DISCONTINUED | OUTPATIENT
Start: 2019-02-04 | End: 2019-02-04

## 2019-02-04 RX ORDER — AMLODIPINE BESYLATE 2.5 MG/1
1 TABLET ORAL
Qty: 30 | Refills: 0
Start: 2019-02-04 | End: 2019-03-05

## 2019-02-04 RX ORDER — QUETIAPINE FUMARATE 200 MG/1
200 TABLET, FILM COATED ORAL AT BEDTIME
Qty: 0 | Refills: 0 | Status: DISCONTINUED | OUTPATIENT
Start: 2019-02-04 | End: 2019-02-05

## 2019-02-04 RX ORDER — ACETAMINOPHEN 500 MG
650 TABLET ORAL EVERY 6 HOURS
Qty: 0 | Refills: 0 | Status: DISCONTINUED | OUTPATIENT
Start: 2019-02-04 | End: 2019-02-05

## 2019-02-04 RX ORDER — HYDROMORPHONE HYDROCHLORIDE 2 MG/ML
2 INJECTION INTRAMUSCULAR; INTRAVENOUS; SUBCUTANEOUS ONCE
Qty: 0 | Refills: 0 | Status: DISCONTINUED | OUTPATIENT
Start: 2019-02-04 | End: 2019-02-04

## 2019-02-04 RX ADMIN — CINACALCET 30 MILLIGRAM(S): 30 TABLET, FILM COATED ORAL at 12:52

## 2019-02-04 RX ADMIN — SEVELAMER CARBONATE 800 MILLIGRAM(S): 2400 POWDER, FOR SUSPENSION ORAL at 09:52

## 2019-02-04 RX ADMIN — HYDROMORPHONE HYDROCHLORIDE 1 MILLIGRAM(S): 2 INJECTION INTRAMUSCULAR; INTRAVENOUS; SUBCUTANEOUS at 20:45

## 2019-02-04 RX ADMIN — HYDROMORPHONE HYDROCHLORIDE 2 MILLIGRAM(S): 2 INJECTION INTRAMUSCULAR; INTRAVENOUS; SUBCUTANEOUS at 07:01

## 2019-02-04 RX ADMIN — POLYETHYLENE GLYCOL 3350 17 GRAM(S): 17 POWDER, FOR SOLUTION ORAL at 12:51

## 2019-02-04 RX ADMIN — HYDROMORPHONE HYDROCHLORIDE 2 MILLIGRAM(S): 2 INJECTION INTRAMUSCULAR; INTRAVENOUS; SUBCUTANEOUS at 15:24

## 2019-02-04 RX ADMIN — QUETIAPINE FUMARATE 200 MILLIGRAM(S): 200 TABLET, FILM COATED ORAL at 21:19

## 2019-02-04 RX ADMIN — MIRTAZAPINE 45 MILLIGRAM(S): 45 TABLET, ORALLY DISINTEGRATING ORAL at 12:51

## 2019-02-04 RX ADMIN — GABAPENTIN 400 MILLIGRAM(S): 400 CAPSULE ORAL at 19:00

## 2019-02-04 RX ADMIN — SEVELAMER CARBONATE 800 MILLIGRAM(S): 2400 POWDER, FOR SUSPENSION ORAL at 19:00

## 2019-02-04 RX ADMIN — Medication 5 UNIT(S): at 12:53

## 2019-02-04 RX ADMIN — Medication 1 PATCH: at 12:49

## 2019-02-04 RX ADMIN — HYDROMORPHONE HYDROCHLORIDE 1 MILLIGRAM(S): 2 INJECTION INTRAMUSCULAR; INTRAVENOUS; SUBCUTANEOUS at 19:55

## 2019-02-04 RX ADMIN — ESCITALOPRAM OXALATE 10 MILLIGRAM(S): 10 TABLET, FILM COATED ORAL at 12:51

## 2019-02-04 RX ADMIN — AMLODIPINE BESYLATE 5 MILLIGRAM(S): 2.5 TABLET ORAL at 07:00

## 2019-02-04 RX ADMIN — HYDROMORPHONE HYDROCHLORIDE 2 MILLIGRAM(S): 2 INJECTION INTRAMUSCULAR; INTRAVENOUS; SUBCUTANEOUS at 16:15

## 2019-02-04 RX ADMIN — PANTOPRAZOLE SODIUM 40 MILLIGRAM(S): 20 TABLET, DELAYED RELEASE ORAL at 12:51

## 2019-02-04 RX ADMIN — HEPARIN SODIUM 5000 UNIT(S): 5000 INJECTION INTRAVENOUS; SUBCUTANEOUS at 07:01

## 2019-02-04 RX ADMIN — GABAPENTIN 400 MILLIGRAM(S): 400 CAPSULE ORAL at 07:00

## 2019-02-04 RX ADMIN — INSULIN GLARGINE 15 UNIT(S): 100 INJECTION, SOLUTION SUBCUTANEOUS at 21:19

## 2019-02-04 RX ADMIN — SEVELAMER CARBONATE 800 MILLIGRAM(S): 2400 POWDER, FOR SUSPENSION ORAL at 12:49

## 2019-02-04 RX ADMIN — HYDROMORPHONE HYDROCHLORIDE 2 MILLIGRAM(S): 2 INJECTION INTRAMUSCULAR; INTRAVENOUS; SUBCUTANEOUS at 09:14

## 2019-02-04 RX ADMIN — SIMVASTATIN 40 MILLIGRAM(S): 20 TABLET, FILM COATED ORAL at 21:20

## 2019-02-04 RX ADMIN — ONDANSETRON 4 MILLIGRAM(S): 8 TABLET, FILM COATED ORAL at 07:01

## 2019-02-04 RX ADMIN — HEPARIN SODIUM 5000 UNIT(S): 5000 INJECTION INTRAVENOUS; SUBCUTANEOUS at 19:01

## 2019-02-04 RX ADMIN — Medication 5 UNIT(S): at 09:49

## 2019-02-04 RX ADMIN — Medication 1 TABLET(S): at 12:50

## 2019-02-04 NOTE — CHART NOTE - NSCHARTNOTEFT_GEN_A_CORE
2/4/19, Pt. c/o cough, requested meds. Robitussin DM syrup 10 ml po Q6hrs PRN for cough. Seroquel was not given at 12pm ( pt. was not on unit @ admit. time). Seroquel 200 mg po was rescheduled and given 10 pm. Also, pt. c/o severe pain and requested Dilaudid IV only. Dilaudid 0.5 mg IV P x 1stat dos given.

## 2019-02-04 NOTE — PROGRESS NOTE ADULT - PROBLEM SELECTOR PLAN 1
Secondary to missed dialysis for around 10 days.   As per renal, patient has history of non compliance with medical management.   s/p dialysis x1, plan for dc to follow up with dilaysis.

## 2019-02-04 NOTE — CHART NOTE - NSCHARTNOTEFT_GEN_A_CORE
Patient seen and examined at bedside. Pt c/o left middle finger pain. It is tender, nail bed is yellow in colour, warmth, no redness, no fluctuance. Pt states he had infection to the finger before. vitals are stable and clinically stable. Pain management with tylenol.

## 2019-02-04 NOTE — PROGRESS NOTE ADULT - SUBJECTIVE AND OBJECTIVE BOX
[facility] : Progress Note :: [phone], (office [phone]),   Dr La / Dr Hui / Dr Barber / Dr Villalobos / Dr Hang CASTELLANO / Dr Mehta / Dr Sanchez / Dr Alber dumont  _____________________________________________________________________________________________    complains of leg pain and ambulatory dysfunction.    aspirin (Swelling)  penicillins (Swelling)  shellfish (Unknown)    Hospital Medications:   MEDICATIONS  (STANDING):  amLODIPine   Tablet 5 milliGRAM(s) Oral daily  cinacalcet 30 milliGRAM(s) Oral daily  escitalopram 10 milliGRAM(s) Oral daily  gabapentin 400 milliGRAM(s) Oral two times a day  heparin  Injectable 5000 Unit(s) SubCutaneous every 12 hours  HYDROmorphone   Tablet 2 milliGRAM(s) Oral four times a day  insulin glargine Injectable (LANTUS) 15 Unit(s) SubCutaneous at bedtime  insulin lispro Injectable (HumaLOG) 5 Unit(s) SubCutaneous three times a day with meals  mirtazapine 45 milliGRAM(s) Oral daily  Nephro-mauricio 1 Tablet(s) Oral daily  nicotine - 21 mG/24Hr(s) Patch 1 patch Transdermal daily  pantoprazole  Injectable 40 milliGRAM(s) IV Push daily  polyethylene glycol 3350 17 Gram(s) Oral daily  QUEtiapine 200 milliGRAM(s) Oral at bedtime  sevelamer hydrochloride 800 milliGRAM(s) Oral three times a day with meals  simvastatin 40 milliGRAM(s) Oral at bedtime        VITALS:  T(F): 98.3 (19 @ 16:13), Max: 98.9 (19 @ 23:56)  HR: 86 (19 @ 16:13)  BP: 156/90 (19 @ 16:13)  RR: 18 (19 @ 16:13)  SpO2: 98% (19 @ 16:13)  Wt(kg): --      PHYSICAL EXAM:  Constitutional: NAD  HEENT: anicteric sclera, oropharynx clear.  Neck: No JVD  Respiratory: CTAB, no wheezes, rales or rhonchi  Cardiovascular: S1, S2, RRR  Gastrointestinal: BS+, soft, NT/ND  Extremities: bilateral  peripheral edema  Neurological: A/O x 3, no focal deficits  Vascular Access: LUEAVF with thrill and bruit    LABS:      135  |  100  |  63<H>  ----------------------------<  167<H>  4.7   |  24  |  11.40<H>    Ca    8.7      2019 10:42  Phos  8.7     -  Mg     2.2     -    TPro  8.1  /  Alb  3.0<L>  /  TBili  0.3  /  DBili      /  AST  14  /  ALT  21  /  AlkPhos  98  02-    Creatinine Trend: 11.40 <--, 9.51 <--, 14.70 <--, 14.40 <--, 12.20 <--                        10.7   7.6   )-----------( 338      ( 2019 10:42 )             33.6     Urine Studies:  Urinalysis Basic - ( 2019 23:34 )    Color: Yellow / Appearance: Clear / S.010 / pH:   Gluc:  / Ketone: Negative  / Bili: Negative / Urobili: Negative   Blood:  / Protein: 500 mg/dL / Nitrite: Negative   Leuk Esterase: Negative / RBC: 0-2 /HPF / WBC 0-2 /HPF   Sq Epi:  / Non Sq Epi: Negative /HPF / Bacteria: Negative /HPF        RADIOLOGY & ADDITIONAL STUDIES:

## 2019-02-04 NOTE — PROGRESS NOTE ADULT - SUBJECTIVE AND OBJECTIVE BOX
Patient is a 38y old  Male who presents with a chief complaint of nausea, vomiting, missed dialysis x 1 week (05 Feb 2019 14:28)    INTERVAL HPI/OVERNIGHT EVENTS:  patient seen and examined at bedside, feels ok.  Asking for pain medication, advised to jd with his pain management doctor but he will not get any extra IV dilaudid.   Informed, he is for discharge today.       Allergies    aspirin (Swelling)  penicillins (Swelling)  shellfish (Unknown)    Intolerances        REVIEW OF SYSTEMS:  CONSTITUTIONAL: No fever, weight loss, or fatigue  EYES: No eye pain, visual disturbances, or discharge  RESPIRATORY: No cough, wheezing or shortness of breath  CARDIOVASCULAR: No chest pain, feeling of heart beats  GASTROINTESTINAL: abdominal pain.   GENITOURINARY: No dysuria, frequency, hematuria  NEUROLOGICAL: No headaches  MUSCULOSKELETAL: No joint pain  PSYCHIATRIC: No depression or anxiety  HEME/LYMPH: No easy bruising, or bleeding gums  ALLERGY AND IMMUNOLOGIC: No hives or eczema    Medications:  acetaminophen   Tablet .. 650 milliGRAM(s) Oral every 6 hours PRN Mild Pain (1 - 3)  amLODIPine   Tablet 5 milliGRAM(s) Oral daily  calcium carbonate    500 mG (Tums) Chewable 1 Tablet(s) Chew once  cinacalcet 30 milliGRAM(s) Oral daily  escitalopram 10 milliGRAM(s) Oral daily  gabapentin 400 milliGRAM(s) Oral two times a day  guaiFENesin/dextromethorphan  Syrup 10 milliLiter(s) Oral every 6 hours PRN Cough  heparin  Injectable 5000 Unit(s) SubCutaneous every 12 hours  HYDROmorphone   Tablet 2 milliGRAM(s) Oral four times a day  insulin glargine Injectable (LANTUS) 15 Unit(s) SubCutaneous at bedtime  insulin lispro Injectable (HumaLOG) 5 Unit(s) SubCutaneous three times a day with meals  mirtazapine 45 milliGRAM(s) Oral daily  Nephro-mauricio 1 Tablet(s) Oral daily  ondansetron Injectable 4 milliGRAM(s) IV Push every 4 hours PRN Nausea and/or Vomiting  pantoprazole  Injectable 40 milliGRAM(s) IV Push daily  polyethylene glycol 3350 17 Gram(s) Oral daily  QUEtiapine 200 milliGRAM(s) Oral at bedtime  simvastatin 40 milliGRAM(s) Oral at bedtime      PHYSICAL EXAM:  Vital Signs Last 24 Hrs  T(C): 36.9 (05 Feb 2019 11:00), Max: 37.6 (04 Feb 2019 21:49)  T(F): 98.4 (05 Feb 2019 11:00), Max: 99.7 (04 Feb 2019 21:49)  HR: 96 (05 Feb 2019 11:00) (61 - 110)  BP: 138/79 (05 Feb 2019 11:00) (122/73 - 168/88)  BP(mean): --  RR: 16 (05 Feb 2019 11:00) (16 - 18)  SpO2: 91% (05 Feb 2019 05:30) (91% - 98%)    GENERAL: morbidly obese  HEAD:  Atraumatic, Normocephalic  EYES: EOMI, PERRLA, conjunctiva and sclera clear  ENT: moist mucous membranes  NECK: Supple, No JVD, Normal thyroid  NERVOUS SYSTEM:  Alert & Oriented X3, Good concentration;   CHEST/LUNG: clear  HEART: Regular rate and rhythm; No murmurs, rubs, or gallops  ABDOMEN: Soft, Nontender, Nondistended; Bowel sounds present  EXTREMITIES: No LE edema  SKIN: No rashes or lesions    LABS:                        10.4   8.3   )-----------( 338      ( 05 Feb 2019 07:19 )             32.2     02-05    135  |  102  |  70<H>  ----------------------------<  117<H>  5.1   |  20<L>  |  12.90<H>    Ca    9.1      05 Feb 2019 07:19  Phos  8.7     02-04  Mg     2.2     02-04                  Culture & Sensitivity:   CAPILLARY BLOOD GLUCOSE      POCT Blood Glucose.: 125 mg/dL (05 Feb 2019 07:50)  POCT Blood Glucose.: 123 mg/dL (04 Feb 2019 21:19)  POCT Blood Glucose.: 109 mg/dL (04 Feb 2019 17:19)        RADIOLOGY & ADDITIONAL TESTS:  Radiology testing independently reviewed:     Consultant(s) Notes Reviewed:  [ x] YES  [ ] NO    Care Discussed with Consultants/Other Providers [ x] YES  [ ] NO

## 2019-02-04 NOTE — PROGRESS NOTE ADULT - ASSESSMENT
# ESRD- HD in AM. discussed.   # HTN controlled.   # hyperkalemia resolved.  #CKDMBD.  sensipar, renvela.   # anemia of CKD. stable off procrit  # DM2 cont lantus

## 2019-02-04 NOTE — ED ADULT NURSE REASSESSMENT NOTE - NS ED NURSE REASSESS COMMENT FT1
patient observed sleeping hospital bed most of the time seen by QUIQUE Rider as per NP Pt is candidate for D/C ,however Pt stated that unable to ambulate due to B/L LE pain admitted to [facility] for PT evaluation. transferred to  report given to Jarrod SANCHEZ

## 2019-02-05 VITALS
TEMPERATURE: 99 F | DIASTOLIC BLOOD PRESSURE: 82 MMHG | RESPIRATION RATE: 19 BRPM | OXYGEN SATURATION: 93 % | SYSTOLIC BLOOD PRESSURE: 136 MMHG | HEART RATE: 101 BPM

## 2019-02-05 DIAGNOSIS — E87.79 OTHER FLUID OVERLOAD: ICD-10-CM

## 2019-02-05 DIAGNOSIS — M54.9 DORSALGIA, UNSPECIFIED: ICD-10-CM

## 2019-02-05 LAB
ANION GAP SERPL CALC-SCNC: 13 MMOL/L — SIGNIFICANT CHANGE UP (ref 5–17)
BUN SERPL-MCNC: 70 MG/DL — HIGH (ref 7–18)
CALCIUM SERPL-MCNC: 9.1 MG/DL — SIGNIFICANT CHANGE UP (ref 8.4–10.5)
CHLORIDE SERPL-SCNC: 102 MMOL/L — SIGNIFICANT CHANGE UP (ref 96–108)
CO2 SERPL-SCNC: 20 MMOL/L — LOW (ref 22–31)
CREAT SERPL-MCNC: 12.9 MG/DL — HIGH (ref 0.5–1.3)
GLUCOSE SERPL-MCNC: 117 MG/DL — HIGH (ref 70–99)
HCT VFR BLD CALC: 32.2 % — LOW (ref 39–50)
HGB BLD-MCNC: 10.4 G/DL — LOW (ref 13–17)
MCHC RBC-ENTMCNC: 31.5 PG — SIGNIFICANT CHANGE UP (ref 27–34)
MCHC RBC-ENTMCNC: 32.2 GM/DL — SIGNIFICANT CHANGE UP (ref 32–36)
MCV RBC AUTO: 97.8 FL — SIGNIFICANT CHANGE UP (ref 80–100)
PLATELET # BLD AUTO: 338 K/UL — SIGNIFICANT CHANGE UP (ref 150–400)
POTASSIUM SERPL-MCNC: 5.1 MMOL/L — SIGNIFICANT CHANGE UP (ref 3.5–5.3)
POTASSIUM SERPL-SCNC: 5.1 MMOL/L — SIGNIFICANT CHANGE UP (ref 3.5–5.3)
RBC # BLD: 3.29 M/UL — LOW (ref 4.2–5.8)
RBC # FLD: 15.2 % — HIGH (ref 10.3–14.5)
SODIUM SERPL-SCNC: 135 MMOL/L — SIGNIFICANT CHANGE UP (ref 135–145)
WBC # BLD: 8.3 K/UL — SIGNIFICANT CHANGE UP (ref 3.8–10.5)
WBC # FLD AUTO: 8.3 K/UL — SIGNIFICANT CHANGE UP (ref 3.8–10.5)

## 2019-02-05 PROCEDURE — 84100 ASSAY OF PHOSPHORUS: CPT

## 2019-02-05 PROCEDURE — 82962 GLUCOSE BLOOD TEST: CPT

## 2019-02-05 PROCEDURE — 93005 ELECTROCARDIOGRAM TRACING: CPT

## 2019-02-05 PROCEDURE — 83735 ASSAY OF MAGNESIUM: CPT

## 2019-02-05 PROCEDURE — 99261: CPT

## 2019-02-05 PROCEDURE — 84484 ASSAY OF TROPONIN QUANT: CPT

## 2019-02-05 PROCEDURE — 80048 BASIC METABOLIC PNL TOTAL CA: CPT

## 2019-02-05 PROCEDURE — 74176 CT ABD & PELVIS W/O CONTRAST: CPT

## 2019-02-05 PROCEDURE — 71045 X-RAY EXAM CHEST 1 VIEW: CPT

## 2019-02-05 PROCEDURE — 99223 1ST HOSP IP/OBS HIGH 75: CPT

## 2019-02-05 PROCEDURE — 99239 HOSP IP/OBS DSCHRG MGMT >30: CPT

## 2019-02-05 PROCEDURE — 80053 COMPREHEN METABOLIC PANEL: CPT

## 2019-02-05 PROCEDURE — 96374 THER/PROPH/DIAG INJ IV PUSH: CPT

## 2019-02-05 PROCEDURE — 85027 COMPLETE CBC AUTOMATED: CPT

## 2019-02-05 PROCEDURE — 99285 EMERGENCY DEPT VISIT HI MDM: CPT | Mod: 25

## 2019-02-05 PROCEDURE — 36415 COLL VENOUS BLD VENIPUNCTURE: CPT

## 2019-02-05 RX ORDER — HYDROMORPHONE HYDROCHLORIDE 2 MG/ML
2 INJECTION INTRAMUSCULAR; INTRAVENOUS; SUBCUTANEOUS ONCE
Qty: 0 | Refills: 0 | Status: DISCONTINUED | OUTPATIENT
Start: 2019-02-05 | End: 2019-02-05

## 2019-02-05 RX ORDER — LIDOCAINE 4 G/100G
1 CREAM TOPICAL DAILY
Qty: 0 | Refills: 0 | Status: DISCONTINUED | OUTPATIENT
Start: 2019-02-05 | End: 2019-02-05

## 2019-02-05 RX ORDER — MORPHINE SULFATE 50 MG/1
1 CAPSULE, EXTENDED RELEASE ORAL ONCE
Qty: 0 | Refills: 0 | Status: DISCONTINUED | OUTPATIENT
Start: 2019-02-05 | End: 2019-02-05

## 2019-02-05 RX ORDER — CALCIUM CARBONATE 500(1250)
1 TABLET ORAL ONCE
Qty: 0 | Refills: 0 | Status: DISCONTINUED | OUTPATIENT
Start: 2019-02-05 | End: 2019-02-05

## 2019-02-05 RX ORDER — ONDANSETRON 8 MG/1
4 TABLET, FILM COATED ORAL ONCE
Qty: 0 | Refills: 0 | Status: COMPLETED | OUTPATIENT
Start: 2019-02-05 | End: 2019-02-05

## 2019-02-05 RX ADMIN — MORPHINE SULFATE 1 MILLIGRAM(S): 50 CAPSULE, EXTENDED RELEASE ORAL at 05:46

## 2019-02-05 RX ADMIN — HEPARIN SODIUM 5000 UNIT(S): 5000 INJECTION INTRAVENOUS; SUBCUTANEOUS at 05:47

## 2019-02-05 RX ADMIN — HYDROMORPHONE HYDROCHLORIDE 2 MILLIGRAM(S): 2 INJECTION INTRAMUSCULAR; INTRAVENOUS; SUBCUTANEOUS at 19:22

## 2019-02-05 RX ADMIN — Medication 5 UNIT(S): at 18:21

## 2019-02-05 RX ADMIN — GABAPENTIN 400 MILLIGRAM(S): 400 CAPSULE ORAL at 18:21

## 2019-02-05 RX ADMIN — MORPHINE SULFATE 1 MILLIGRAM(S): 50 CAPSULE, EXTENDED RELEASE ORAL at 06:15

## 2019-02-05 RX ADMIN — GABAPENTIN 400 MILLIGRAM(S): 400 CAPSULE ORAL at 05:47

## 2019-02-05 RX ADMIN — HYDROMORPHONE HYDROCHLORIDE 2 MILLIGRAM(S): 2 INJECTION INTRAMUSCULAR; INTRAVENOUS; SUBCUTANEOUS at 15:57

## 2019-02-05 RX ADMIN — SEVELAMER CARBONATE 800 MILLIGRAM(S): 2400 POWDER, FOR SUSPENSION ORAL at 18:22

## 2019-02-05 RX ADMIN — SEVELAMER CARBONATE 800 MILLIGRAM(S): 2400 POWDER, FOR SUSPENSION ORAL at 08:49

## 2019-02-05 RX ADMIN — HEPARIN SODIUM 5000 UNIT(S): 5000 INJECTION INTRAVENOUS; SUBCUTANEOUS at 18:21

## 2019-02-05 RX ADMIN — Medication 5 UNIT(S): at 08:49

## 2019-02-05 RX ADMIN — HYDROMORPHONE HYDROCHLORIDE 2 MILLIGRAM(S): 2 INJECTION INTRAMUSCULAR; INTRAVENOUS; SUBCUTANEOUS at 16:27

## 2019-02-05 RX ADMIN — AMLODIPINE BESYLATE 5 MILLIGRAM(S): 2.5 TABLET ORAL at 05:47

## 2019-02-05 NOTE — PROGRESS NOTE ADULT - PROBLEM SELECTOR PLAN 1
Secondary to missed dialysis for around 10 days.   As per renal, patient has history of non compliance with medical management.   s/p dialysis x2, plan for dc to follow up with dialysis outpatient.

## 2019-02-05 NOTE — PROGRESS NOTE ADULT - SUBJECTIVE AND OBJECTIVE BOX
PGY1 Note discussed with supervising resident and primary attending.    Patient is a 38y old  Male who presents with a chief complaint of nausea, vomiting, missed dialysis x 1 week (05 Feb 2019 14:59)      INTERVAL HPI/OVERNIGHT EVENTS: patient assessed bedside, reports nausea on eating but has not had vomiting since morning.     MEDICATIONS  (STANDING):  amLODIPine   Tablet 5 milliGRAM(s) Oral daily  calcium carbonate    500 mG (Tums) Chewable 1 Tablet(s) Chew once  cinacalcet 30 milliGRAM(s) Oral daily  escitalopram 10 milliGRAM(s) Oral daily  gabapentin 400 milliGRAM(s) Oral two times a day  heparin  Injectable 5000 Unit(s) SubCutaneous every 12 hours  HYDROmorphone   Tablet 2 milliGRAM(s) Oral four times a day  HYDROmorphone   Tablet 2 milliGRAM(s) Oral once  insulin glargine Injectable (LANTUS) 15 Unit(s) SubCutaneous at bedtime  insulin lispro Injectable (HumaLOG) 5 Unit(s) SubCutaneous three times a day with meals  mirtazapine 45 milliGRAM(s) Oral daily  Nephro-mauricio 1 Tablet(s) Oral daily  nicotine - 21 mG/24Hr(s) Patch 1 patch Transdermal daily  pantoprazole  Injectable 40 milliGRAM(s) IV Push daily  polyethylene glycol 3350 17 Gram(s) Oral daily  QUEtiapine 200 milliGRAM(s) Oral at bedtime  sevelamer hydrochloride 800 milliGRAM(s) Oral three times a day with meals  simvastatin 40 milliGRAM(s) Oral at bedtime    MEDICATIONS  (PRN):  acetaminophen   Tablet .. 650 milliGRAM(s) Oral every 6 hours PRN Mild Pain (1 - 3)  guaiFENesin/dextromethorphan  Syrup 10 milliLiter(s) Oral every 6 hours PRN Cough  ondansetron Injectable 4 milliGRAM(s) IV Push every 4 hours PRN Nausea and/or Vomiting      Allergies    aspirin (Swelling)  penicillins (Swelling)  shellfish (Unknown)    Intolerances        REVIEW OF SYSTEMS:  CONSTITUTIONAL: No fever, or fatigue  RESPIRATORY: No cough, wheezing, or shortness of breath  CARDIOVASCULAR: No chest pain, palpitations or leg swelling  GASTROINTESTINAL: diffuse abdominal pain, nausea; no vomiting, diarrhea or constipation.   NEUROLOGICAL: No headaches, memory loss, loss of strength, numbness, or tremors  SKIN: No itching, burning, rashes, or lesions     Vital Signs Last 24 Hrs  T(C): 36.9 (05 Feb 2019 11:00), Max: 37.6 (04 Feb 2019 21:49)  T(F): 98.4 (05 Feb 2019 11:00), Max: 99.7 (04 Feb 2019 21:49)  HR: 96 (05 Feb 2019 11:00) (61 - 110)  BP: 138/79 (05 Feb 2019 11:00) (122/73 - 168/88)  BP(mean): --  RR: 16 (05 Feb 2019 11:00) (16 - 18)  SpO2: 91% (05 Feb 2019 05:30) (91% - 98%)    PHYSICAL EXAM:  GENERAL: obese  NECK: Supple, No JVD, Normal thyroid  CHEST/LUNG: Clear to ausculttaion  HEART: Regular rate and rhythm  ABDOMEN: Soft, tender, Nondistended; Bowel sounds present  NERVOUS SYSTEM:  Alert & Oriented X3, no focal deficits  EXTREMITIES:  no edema      LABS:                        10.4   8.3   )-----------( 338      ( 05 Feb 2019 07:19 )             32.2     02-05    135  |  102  |  70<H>  ----------------------------<  117<H>  5.1   |  20<L>  |  12.90<H>    Ca    9.1      05 Feb 2019 07:19  Phos  8.7     02-04  Mg     2.2     02-04          CAPILLARY BLOOD GLUCOSE      POCT Blood Glucose.: 125 mg/dL (05 Feb 2019 07:50)  POCT Blood Glucose.: 123 mg/dL (04 Feb 2019 21:19)  POCT Blood Glucose.: 109 mg/dL (04 Feb 2019 17:19)      Consultant(s) Notes Reviewed:  [ x ] YES  [ ] NO

## 2019-02-05 NOTE — PROGRESS NOTE ADULT - PROBLEM SELECTOR PLAN 1
s/p dialysis today  nephrology - Dr. La  D/c pending as pt is not happy about his outpt HD arrangements

## 2019-02-05 NOTE — PROGRESS NOTE ADULT - SUBJECTIVE AND OBJECTIVE BOX
[facility] : Progress Note :: [phone], (office [phone]),   Dr La / Dr Hui / Dr Barber / Dr Villalobos / Dr Hang CASTELLANO / Dr Mehta / Dr Sanchez / Dr Alber dumont  _____________________________________________________________________________________________    seen on HD. Still with leg pain    aspirin (Swelling)  penicillins (Swelling)  shellfish (Unknown)    Hospital Medications:   MEDICATIONS  (STANDING):  amLODIPine   Tablet 5 milliGRAM(s) Oral daily  calcium carbonate    500 mG (Tums) Chewable 1 Tablet(s) Chew once  cinacalcet 30 milliGRAM(s) Oral daily  escitalopram 10 milliGRAM(s) Oral daily  gabapentin 400 milliGRAM(s) Oral two times a day  heparin  Injectable 5000 Unit(s) SubCutaneous every 12 hours  HYDROmorphone   Tablet 2 milliGRAM(s) Oral four times a day  insulin glargine Injectable (LANTUS) 15 Unit(s) SubCutaneous at bedtime  insulin lispro Injectable (HumaLOG) 5 Unit(s) SubCutaneous three times a day with meals  mirtazapine 45 milliGRAM(s) Oral daily  Nephro-mauricio 1 Tablet(s) Oral daily  nicotine - 21 mG/24Hr(s) Patch 1 patch Transdermal daily  pantoprazole  Injectable 40 milliGRAM(s) IV Push daily  polyethylene glycol 3350 17 Gram(s) Oral daily  QUEtiapine 200 milliGRAM(s) Oral at bedtime  sevelamer hydrochloride 800 milliGRAM(s) Oral three times a day with meals  simvastatin 40 milliGRAM(s) Oral at bedtime        VITALS:  T(F): 98.4 (02-05-19 @ 11:00), Max: 99.7 (02-04-19 @ 21:49)  HR: 96 (02-05-19 @ 11:00)  BP: 138/79 (02-05-19 @ 11:00)  RR: 16 (02-05-19 @ 11:00)  SpO2: 91% (02-05-19 @ 05:30)  Wt(kg): --      PHYSICAL EXAM:  Constitutional: NAD  HEENT: anicteric sclera, oropharynx clear,  Neck: No JVD  Respiratory: CTAB, no wheezes, rales or rhonchi  Cardiovascular: S1, S2, RRR  Gastrointestinal: BS+, soft, NT/ND  Extremities:  peripheral edema++  Neurological: A/O x 3, no focal deficits  Vascular Access: AVF cannulated    LABS:  02-05    135  |  102  |  70<H>  ----------------------------<  117<H>  5.1   |  20<L>  |  12.90<H>    Ca    9.1      05 Feb 2019 07:19  Phos  8.7     02-04  Mg     2.2     02-04      Creatinine Trend: 12.90 <--, 11.40 <--, 9.51 <--, 14.70 <--, 14.40 <--                        10.4   8.3   )-----------( 338      ( 05 Feb 2019 07:19 )             32.2     Urine Studies:      RADIOLOGY & ADDITIONAL STUDIES:

## 2019-02-05 NOTE — PROGRESS NOTE ADULT - SUBJECTIVE AND OBJECTIVE BOX
Patient is a 38y old  Male who presents with a chief complaint of nausea, vomiting, missed dialysis x 1 week (05 Feb 2019 14:28)    INTERVAL HPI/OVERNIGHT EVENTS:  Patient seen and examined at bedside.   Notified by nurse that patient is refusing to take his PO Dilaudid requesting IV. In dialysis,  patient threatened: ''It will not be good if I don't get my IV Dilaudid.''  Patient was for discharge since yesterday, unclear why he did not leave.  He was notified again today that he is for discharge.   Allergies    aspirin (Swelling)  penicillins (Swelling)  shellfish (Unknown)    Intolerances      REVIEW OF SYSTEMS:  CONSTITUTIONAL: No fever, weight loss, or fatigue  EYES: No eye pain, visual disturbances, or discharge  RESPIRATORY: No cough, wheezing or shortness of breath  CARDIOVASCULAR: No chest pain, feeling of heart beats  GASTROINTESTINAL: No abdominal or epigastric pain. No nausea, vomiting; No diarrhea or constipation.  GENITOURINARY: No dysuria, frequency, hematuria  NEUROLOGICAL: No headaches  MUSCULOSKELETAL: pain in the left knee.   PSYCHIATRIC: No depression or anxiety  HEME/LYMPH: No easy bruising, or bleeding gums  ALLERGY AND IMMUNOLOGIC: No hives or eczema    Medications:  acetaminophen   Tablet .. 650 milliGRAM(s) Oral every 6 hours PRN Mild Pain (1 - 3)  amLODIPine   Tablet 5 milliGRAM(s) Oral daily  calcium carbonate    500 mG (Tums) Chewable 1 Tablet(s) Chew once  cinacalcet 30 milliGRAM(s) Oral daily  escitalopram 10 milliGRAM(s) Oral daily  gabapentin 400 milliGRAM(s) Oral two times a day  guaiFENesin/ dextromethorphan  Syrup 10 milliLiter(s) Oral every 6 hours PRN Cough  heparin  Injectable 5000 Unit(s) SubCutaneous every 12 hours  HYDROmorphone   Tablet 2 milliGRAM(s) Oral four times a day  insulin glargine Injectable (LANTUS) 15 Unit(s) SubCutaneous at bedtime  insulin lispro Injectable (HumaLOG) 5 Unit(s) SubCutaneous three times a day with meals  mirtazapine 45 milliGRAM(s) Oral daily  Nephro-mauricio 1 Tablet(s) Oral daily  ondansetron Injectable 4 milliGRAM(s) IV Push every 4 hours PRN Nausea and/or Vomiting  pantoprazole  Injectable 40 milliGRAM(s) IV Push daily  polyethylene glycol 3350 17 Gram(s) Oral daily  QUEtiapine 200 milliGRAM(s) Oral at bedtime  simvastatin 40 milliGRAM(s) Oral at bedtime      PHYSICAL EXAM:  Vital Signs Last 24 Hrs  T(C): 36.9 (05 Feb 2019 11:00), Max: 37.6 (04 Feb 2019 21:49)  T(F): 98.4 (05 Feb 2019 11:00), Max: 99.7 (04 Feb 2019 21:49)  HR: 96 (05 Feb 2019 11:00) (61 - 110)  BP: 138/79 (05 Feb 2019 11:00) (122/73 - 168/88)  BP(mean): --  RR: 16 (05 Feb 2019 11:00) (16 - 18)  SpO2: 91% (05 Feb 2019 05:30) (91% - 98%)    GENERAL: NAD, morbidly obese.   HEAD:  Atraumatic, Normocephalic  EYES: EOMI, PERRLA, conjunctiva and sclera clear  ENT: moist mucous membranes  NECK: Supple, No JVD, Normal thyroid  NERVOUS SYSTEM:  Alert & Oriented X3, Good concentration.  CHEST/LUNG: clear.   HEART: Regular rate and rhythm; No murmurs, rubs, or gallops  ABDOMEN: BS positive.   EXTREMITIES: no effusion appreciated on the left knee.   SKIN: No rashes or lesions    LABS:                        10.4   8.3   )-----------( 338      ( 05 Feb 2019 07:19 )             32.2     02-05    135  |  102  |  70<H>  ----------------------------<  117<H>  5.1   |  20<L>  |  12.90<H>    Ca    9.1      05 Feb 2019 07:19  Phos  8.7     02-04  Mg     2.2     02-04    Culture & Sensitivity:   CAPILLARY BLOOD GLUCOSE    POCT Blood Glucose.: 125 mg/dL (05 Feb 2019 07:50)  POCT Blood Glucose.: 123 mg/dL (04 Feb 2019 21:19)  POCT Blood Glucose.: 109 mg/dL (04 Feb 2019 17:19)    RADIOLOGY & ADDITIONAL TESTS:  Radiology testing independently reviewed:     Consultant(s) Notes Reviewed:  [ x] YES  [ ] NO    Care Discussed with Consultants/Other Providers [ x] YES  [ ] NO

## 2019-02-05 NOTE — PROGRESS NOTE ADULT - ASSESSMENT
# ESRD- seen on HD WITH Ultrafiltration as tolerated.  # HTN controlled.   # hyperkalemia sec to missed HD. resolved.   #CKDMBD. phos uncontrolled. cont sensipar. Incr  renvela dose to 2400 TID AC  # anemia of CKD. stable. start procrit on thursday  # DM2 cont lantus  # LLE pain. Awaiting PT eval for discharge disposition

## 2019-02-05 NOTE — CONSULT NOTE ADULT - SUBJECTIVE AND OBJECTIVE BOX
Chief Complaint:    HPI:  39 y/o M patient, on dialysis (TTS), with a significant PMHx of DM, Bipolar, schizophrenia, ESRD, morbid obesity and significant PSHx of history of hernia repair presents to the ED with nausea, vomiting, and having missed dialysis for the past week. Patient last received dialysis on 1/24, and missed subsequent sessions due to  not showing up due to some confusion on  process. Patient reports having had nausea and vomiting for the last week, only able to keep down solid foods. Patient denies fever, however admits to diarrheal episode 1 week ago, and last has bowel movement on 2/2 which was loose, however not watery. Patient also admits to some periumbilical and LLQ tenderness. All other ROS including chest pain and shortness of breath are negative. (02 Feb 2019 23:09)      PAST MEDICAL & SURGICAL HISTORY:  Bipolar disorder  Schizophrenia  ESRD on dialysis  Morbid obesity with BMI of 40.0-44.9, adult  Diabetes  H/O hernia repair      FAMILY HISTORY:  Family history of diabetes mellitus (Mother)      SOCIAL HISTORY:  [ ] Denies Smoking, Alcohol, or Drug Use    Allergies    aspirin (Swelling)  penicillins (Swelling)  shellfish (Unknown)    Intolerances        PAIN MEDICATIONS:  acetaminophen   Tablet .. 650 milliGRAM(s) Oral every 6 hours PRN  escitalopram 10 milliGRAM(s) Oral daily  gabapentin 400 milliGRAM(s) Oral two times a day  mirtazapine 45 milliGRAM(s) Oral daily  ondansetron Injectable 4 milliGRAM(s) IV Push every 4 hours PRN  QUEtiapine 200 milliGRAM(s) Oral at bedtime      Heme:  heparin  Injectable 5000 Unit(s) SubCutaneous every 12 hours    Antibiotics:    Cardiovascular:  amLODIPine   Tablet 5 milliGRAM(s) Oral daily    GI:  calcium carbonate    500 mG (Tums) Chewable 1 Tablet(s) Chew once  pantoprazole  Injectable 40 milliGRAM(s) IV Push daily  polyethylene glycol 3350 17 Gram(s) Oral daily    Endocrine:  cinacalcet 30 milliGRAM(s) Oral daily  insulin glargine Injectable (LANTUS) 15 Unit(s) SubCutaneous at bedtime  insulin lispro Injectable (HumaLOG) 5 Unit(s) SubCutaneous three times a day with meals  simvastatin 40 milliGRAM(s) Oral at bedtime    All Other Medications:  Nephro-mauricio 1 Tablet(s) Oral daily  nicotine - 21 mG/24Hr(s) Patch 1 patch Transdermal daily  sevelamer hydrochloride 800 milliGRAM(s) Oral three times a day with meals      REVIEW OF SYSTEMS:    CONSTITUTIONAL: No fever, weight loss, or fatigue  EYES: No eye pain, visual disturbances, or discharge  ENMT:  No difficulty hearing, tinnitus, vertigo; No sinus or throat pain  NECK: No pain or stiffness  BREASTS: No pain, masses, or nipple discharge  RESPIRATORY: No cough, wheezing, chills or hemoptysis; No shortness of breath  CARDIOVASCULAR: No chest pain, palpitations, dizziness, or leg swelling  GASTROINTESTINAL: No abdominal or epigastric pain. No nausea, vomiting, or hematemesis; No diarrhea or constipation. No melena or hematochezia.  GENITOURINARY: No dysuria, frequency, hematuria, or incontinence  NEUROLOGICAL: No headaches, memory loss, loss of strength, numbness, or tremors  SKIN: No itching, burning, rashes, or lesions   LYMPH NODES: No enlarged glands  ENDOCRINE: No heat or cold intolerance; No hair loss  MUSCULOSKELETAL: No joint pain or swelling; No muscle, back, or extremity pain  PSYCHIATRIC: No depression, anxiety, mood swings, or difficulty sleeping  HEME/LYMPH: No easy bruising, or bleeding gums  ALLERY AND IMMUNOLOGIC: No hives or eczema      Vital Signs Last 24 Hrs  T(C): 36.9 (05 Feb 2019 11:00), Max: 37.6 (04 Feb 2019 21:49)  T(F): 98.4 (05 Feb 2019 11:00), Max: 99.7 (04 Feb 2019 21:49)  HR: 96 (05 Feb 2019 11:00) (61 - 110)  BP: 138/79 (05 Feb 2019 11:00) (122/73 - 168/88)  BP(mean): --  RR: 16 (05 Feb 2019 11:00) (16 - 18)  SpO2: 91% (05 Feb 2019 05:30) (91% - 98%)    PAIN SCORE:         SCALE USED: (1-10 VNRS)             PHYSICAL EXAM:    GENERAL: NAD, well-groomed, well-developed  HEAD:  Atraumatic, Normocephalic  EYES: EOMI, PERRLA, conjunctiva and sclera clear  ENMT: No tonsillar erythema, exudates, or enlargement; Moist mucous membranes, Good dentition, No lesions  NECK: Supple, No JVD, Normal thyroid  NERVOUS SYSTEM:  Alert & Oriented X3, Good concentration; Motor Strength 5/5 B/L upper and lower extremities; DTRs 2+ intact and symmetric  CHEST/LUNG: Clear to percussion bilaterally; No rales, rhonchi, wheezing, or rubs  HEART: Regular rate and rhythm; No murmurs, rubs, or gallops  ABDOMEN: Soft, Nontender, Nondistended; Bowel sounds present  EXTREMITIES:  2+ Peripheral Pulses, No clubbing, cyanosis, or edema  LYMPH: No lymphadenopathy noted  SKIN: No rashes or lesions    Back: No CVA tenderness, No paravetebral tenderness    LABS:                          10.4   8.3   )-----------( 338      ( 05 Feb 2019 07:19 )             32.2     02-05    135  |  102  |  70<H>  ----------------------------<  117<H>  5.1   |  20<L>  |  12.90<H>    Ca    9.1      05 Feb 2019 07:19  Phos  8.7     02-04  Mg     2.2     02-04            RADIOLOGY:    Drug Screen:        RECOMMENDATIONS    [ ]  [place]  Reviewed and Copied to Chart Chief Complaint:    HPI:  39 y/o M patient, on dialysis (TTS), with a significant PMHx of DM, Bipolar, schizophrenia, ESRD, morbid obesity and significant PSHx of history of hernia repair presents to the ED with nausea, vomiting, and having missed dialysis for the past week. Patient last received dialysis on 1/24, and missed subsequent sessions due to  not showing up due to some confusion on  process. Patient reports having had nausea and vomiting for the last week, only able to keep down solid foods. Patient denies fever, however admits to diarrheal episode 1 week ago, and last has bowel movement on 2/2 which was loose, however not watery. Patient also admits to some periumbilical and LLQ tenderness. All other ROS including chest pain and shortness of breath are negative. (02 Feb 2019 23:09)    38 year old male with PMH as below.  Pt missed HD for one week due to Transportation issues.  Pt now complaining of lower back with radiculopathy left leg.  + left knee pain. Pt also complaining of abdominal pain with vomiting.  Pt states that he takes hydromorphone at home for pain.  Last prescription was given by me which was a 5 days supply for treatement of cellulitis.  Left knee - no erythema or tenderness.  Pt with ESRD and on HD - T/Th/ Sat.  Pt istop in chart.  Pt does not see pain mgt as outpt.          PAST MEDICAL & SURGICAL HISTORY:  Bipolar disorder  Schizophrenia  ESRD on dialysis  Morbid obesity with BMI of 40.0-44.9, adult  Diabetes  H/O hernia repair      FAMILY HISTORY:  Family history of diabetes mellitus (Mother)      SOCIAL HISTORY:  Pt did not admit to any illicit drug use    Allergies    aspirin (Swelling)  penicillins (Swelling)  shellfish (Unknown)    Intolerances        PAIN MEDICATIONS:  acetaminophen   Tablet .. 650 milliGRAM(s) Oral every 6 hours PRN  escitalopram 10 milliGRAM(s) Oral daily  gabapentin 400 milliGRAM(s) Oral two times a day  mirtazapine 45 milliGRAM(s) Oral daily  ondansetron Injectable 4 milliGRAM(s) IV Push every 4 hours PRN  QUEtiapine 200 milliGRAM(s) Oral at bedtime      Heme:  heparin  Injectable 5000 Unit(s) SubCutaneous every 12 hours    Antibiotics:    Cardiovascular:  amLODIPine   Tablet 5 milliGRAM(s) Oral daily    GI:  calcium carbonate    500 mG (Tums) Chewable 1 Tablet(s) Chew once  pantoprazole  Injectable 40 milliGRAM(s) IV Push daily  polyethylene glycol 3350 17 Gram(s) Oral daily    Endocrine:  cinacalcet 30 milliGRAM(s) Oral daily  insulin glargine Injectable (LANTUS) 15 Unit(s) SubCutaneous at bedtime  insulin lispro Injectable (HumaLOG) 5 Unit(s) SubCutaneous three times a day with meals  simvastatin 40 milliGRAM(s) Oral at bedtime    All Other Medications:  Nephro-mauricio 1 Tablet(s) Oral daily  nicotine - 21 mG/24Hr(s) Patch 1 patch Transdermal daily  sevelamer hydrochloride 800 milliGRAM(s) Oral three times a day with meals      REVIEW OF SYSTEMS:    CONSTITUTIONAL: No fever, weight loss, or fatigue  RESPIRATORY: No cough, wheezing, chills or hemoptysis; No shortness of breath  CARDIOVASCULAR: No chest pain, palpitations, dizziness,+ le edema  GASTROINTESTINAL: + abdominal pain, + nausea + vomiting, + diarrhea  GENITOURINARY: No hematuria, +ESRD, pt is still urinating   NEUROLOGICAL: No headaches, memory loss, loss of strength, numbness, or tremors  MUSCULOSKELETAL: + lower back pain, + left knee pain      Vital Signs Last 24 Hrs  T(C): 36.9 (05 Feb 2019 11:00), Max: 37.6 (04 Feb 2019 21:49)  T(F): 98.4 (05 Feb 2019 11:00), Max: 99.7 (04 Feb 2019 21:49)  HR: 96 (05 Feb 2019 11:00) (61 - 110)  BP: 138/79 (05 Feb 2019 11:00) (122/73 - 168/88)  BP(mean): --  RR: 16 (05 Feb 2019 11:00) (16 - 18)  SpO2: 91% (05 Feb 2019 05:30) (91% - 98%)    PAIN SCORE:  5/10       SCALE USED: (1-10 VNRS)             PHYSICAL EXAM:    GENERAL: NAD, obese  NERVOUS SYSTEM:  Alert & Oriented X3, Good concentration;   CHEST/LUNG: Clear to percussion bilaterally; No rales, rhonchi, wheezing, or rubs  HEART: Regular rate and rhythm; No murmurs, rubs, or gallops  ABDOMEN: Soft, Nontender, Nondistended; Bowel sounds present, obese  EXTREMITIES:  2+ Peripheral Pulses, No clubbing, cyanosis, or edema + left knee - + mild tenderness on palpation, - erythema no edema  Back: No CVA tenderness, No paravertebral tenderness    LABS:                          10.4   8.3   )-----------( 338      ( 05 Feb 2019 07:19 )             32.2     02-05    135  |  102  |  70<H>  ----------------------------<  117<H>  5.1   |  20<L>  |  12.90<H>    Ca    9.1      05 Feb 2019 07:19  Phos  8.7     02-04  Mg     2.2     02-04            RADIOLOGY:  < from: CT Abdomen and Pelvis No Cont (02.02.19 @ 22:39) >  EXAM:  CT ABDOMEN AND PELVIS                            PROCEDURE DATE:  02/02/2019          INTERPRETATION:  CLINICAL INFORMATION: Nausea and abdominal pain.    TECHNIQUE: Noncontrast CT of the abdomen and pelvis was performed with   coronal and sagittal reformats. No oral contrast.    COMPARISON: CT abdomen and pelvis 1/29/2019.    FINDINGS:  Assessment of solid organs and viscera is limited without intravenous   contrast enhancement. Patient contact with the CT gantry demonstrates   artifactlimiting evaluation of the left lateral abdomen.    LOWER CHEST: Within normal limits.    HEPATOBILIARY: 1.1 cm nodule ventral to the liver (2-17), possibly a   lymph node, stable when compared to 4/4/2016. Liver, gallbladder and bile   ducts within normal limits.  PANCREAS: Within normal limits.  SPLEEN: Within normal limits.  ADRENALS: Within normal limits.    KIDNEYS/URETERS/BLADDER: Within normal limits.  REPRODUCTIVE ORGANS: Within normal limits.    BOWEL/PERITONEUM:  No bowel obstruction, inflammation or   pneumoperitoneum.  Normal appendix. Portions of the gastrointestinal   tract are collapsed, limiting evaluation.     VESSELS:  Within normal limits.  LYMPHATICS/RETROPERITONEUM: No lymphadenopathy. No retroperitoneal   hematoma.      SOFT TISSUES: Within normal limits.  BONES: Within normal limits.    IMPRESSION: No acute findings or interval change since 1/29/2019.   Unchanged 1.1 cm nodule ventral to the liver, possibly a lymph node and   likely of doubtful clinical significancegiven stability since 4/4/2016.    < end of copied text >    < from: Xray Lumbar Spine AP + Lateral (12.03.18 @ 10:58) >  EXAM:  LUMBAR SPINE AP AND LATERAL                            PROCEDURE DATE:  12/03/2018          INTERPRETATION:  INDICATION: back pain    PRIORS: February 15, 2018    VIEWS: AP and lateral radiographs of the lumbar spine were performed.    FINDINGS:     No significant interval change is identified. The lumbar vertebral bodies   maintain normal height. No intervertebral disc space narrowing is   identified.  There is no evidence for acute fracture in the lumbar spine.   Vertebral alignment appears unremarkable. No sacral fracture is noted.   The sacroiliac joints appear intact.    IMPRESSION: No evidence for acute fracture. Vertebral alignment appears   unremarkable. No significant intervertebral disc space narrowing   identified.    < end of copied text >    Drug Screen:        RECOMMENDATIONS    [x ]  [place]  Reviewed and Copied to Chart

## 2019-02-05 NOTE — PROGRESS NOTE ADULT - REASON FOR ADMISSION
nausea, vomiting, missed dialysis x 1 week

## 2019-02-05 NOTE — PROGRESS NOTE ADULT - ASSESSMENT
Patient admitted for nausea, vomiting, abdominal pain, s/p 1 week of missed dialysis, found to be hyperkalemic to 6.1 without any peaked T waves on EKG. Admitted for missed HD

## 2019-02-05 NOTE — PROGRESS NOTE ADULT - PROBLEM SELECTOR PLAN 2
CT abdomen/pelvis negative for any acute pathology  ? if related to bowel wall edema from hypervolemia secondary to lack of dialysis.  Resolved now

## 2019-02-21 ENCOUNTER — INPATIENT (INPATIENT)
Facility: HOSPITAL | Age: 39
LOS: 1 days | Discharge: HOME | DRG: 391 | End: 2019-02-23
Attending: HOSPITALIST | Admitting: HOSPITALIST
Payer: MEDICARE

## 2019-02-21 VITALS
HEIGHT: 72 IN | DIASTOLIC BLOOD PRESSURE: 95 MMHG | RESPIRATION RATE: 16 BRPM | SYSTOLIC BLOOD PRESSURE: 160 MMHG | TEMPERATURE: 98 F | WEIGHT: 315 LBS | HEART RATE: 98 BPM | OXYGEN SATURATION: 96 %

## 2019-02-21 DIAGNOSIS — I10 ESSENTIAL (PRIMARY) HYPERTENSION: ICD-10-CM

## 2019-02-21 DIAGNOSIS — F20.9 SCHIZOPHRENIA, UNSPECIFIED: ICD-10-CM

## 2019-02-21 DIAGNOSIS — E11.9 TYPE 2 DIABETES MELLITUS WITHOUT COMPLICATIONS: ICD-10-CM

## 2019-02-21 DIAGNOSIS — R55 SYNCOPE AND COLLAPSE: ICD-10-CM

## 2019-02-21 DIAGNOSIS — Z98.89 OTHER SPECIFIED POSTPROCEDURAL STATES: Chronic | ICD-10-CM

## 2019-02-21 DIAGNOSIS — N18.6 END STAGE RENAL DISEASE: ICD-10-CM

## 2019-02-21 DIAGNOSIS — R10.9 UNSPECIFIED ABDOMINAL PAIN: ICD-10-CM

## 2019-02-21 DIAGNOSIS — Z29.9 ENCOUNTER FOR PROPHYLACTIC MEASURES, UNSPECIFIED: ICD-10-CM

## 2019-02-21 PROBLEM — F31.9 BIPOLAR DISORDER, UNSPECIFIED: Chronic | Status: ACTIVE | Noted: 2019-02-02

## 2019-02-21 LAB
ALBUMIN SERPL ELPH-MCNC: 2.8 G/DL — LOW (ref 3.5–5)
ALP SERPL-CCNC: 87 U/L — SIGNIFICANT CHANGE UP (ref 40–120)
ALT FLD-CCNC: 16 U/L DA — SIGNIFICANT CHANGE UP (ref 10–60)
ANION GAP SERPL CALC-SCNC: 11 MMOL/L — SIGNIFICANT CHANGE UP (ref 5–17)
APTT BLD: 36 SEC — SIGNIFICANT CHANGE UP (ref 27.5–36.3)
AST SERPL-CCNC: 10 U/L — SIGNIFICANT CHANGE UP (ref 10–40)
BASOPHILS # BLD AUTO: 0.06 K/UL — SIGNIFICANT CHANGE UP (ref 0–0.2)
BASOPHILS NFR BLD AUTO: 0.4 % — SIGNIFICANT CHANGE UP (ref 0–2)
BILIRUB SERPL-MCNC: 0.3 MG/DL — SIGNIFICANT CHANGE UP (ref 0.2–1.2)
BUN SERPL-MCNC: 53 MG/DL — HIGH (ref 7–18)
CALCIUM SERPL-MCNC: 9.3 MG/DL — SIGNIFICANT CHANGE UP (ref 8.4–10.5)
CHLORIDE SERPL-SCNC: 102 MMOL/L — SIGNIFICANT CHANGE UP (ref 96–108)
CK MB BLD-MCNC: <1.2 % — SIGNIFICANT CHANGE UP (ref 0–3.5)
CK MB CFR SERPL CALC: <1 NG/ML — SIGNIFICANT CHANGE UP (ref 0–3.6)
CK SERPL-CCNC: 84 U/L — SIGNIFICANT CHANGE UP (ref 35–232)
CO2 SERPL-SCNC: 23 MMOL/L — SIGNIFICANT CHANGE UP (ref 22–31)
CREAT SERPL-MCNC: 10.3 MG/DL — HIGH (ref 0.5–1.3)
EOSINOPHIL # BLD AUTO: 0.51 K/UL — HIGH (ref 0–0.5)
EOSINOPHIL NFR BLD AUTO: 3.7 % — SIGNIFICANT CHANGE UP (ref 0–6)
GLUCOSE SERPL-MCNC: 96 MG/DL — SIGNIFICANT CHANGE UP (ref 70–99)
HCT VFR BLD CALC: 32.6 % — LOW (ref 39–50)
HGB BLD-MCNC: 10.5 G/DL — LOW (ref 13–17)
IMM GRANULOCYTES NFR BLD AUTO: 0.6 % — SIGNIFICANT CHANGE UP (ref 0–1.5)
INR BLD: 0.95 RATIO — SIGNIFICANT CHANGE UP (ref 0.88–1.16)
LIDOCAIN IGE QN: 467 U/L — HIGH (ref 73–393)
LYMPHOCYTES # BLD AUTO: 19.9 % — SIGNIFICANT CHANGE UP (ref 13–44)
LYMPHOCYTES # BLD AUTO: 2.75 K/UL — SIGNIFICANT CHANGE UP (ref 1–3.3)
MCHC RBC-ENTMCNC: 31.4 PG — SIGNIFICANT CHANGE UP (ref 27–34)
MCHC RBC-ENTMCNC: 32.2 GM/DL — SIGNIFICANT CHANGE UP (ref 32–36)
MCV RBC AUTO: 97.6 FL — SIGNIFICANT CHANGE UP (ref 80–100)
MONOCYTES # BLD AUTO: 0.86 K/UL — SIGNIFICANT CHANGE UP (ref 0–0.9)
MONOCYTES NFR BLD AUTO: 6.2 % — SIGNIFICANT CHANGE UP (ref 2–14)
NEUTROPHILS # BLD AUTO: 9.59 K/UL — HIGH (ref 1.8–7.4)
NEUTROPHILS NFR BLD AUTO: 69.2 % — SIGNIFICANT CHANGE UP (ref 43–77)
NRBC # BLD: 0 /100 WBCS — SIGNIFICANT CHANGE UP (ref 0–0)
PCP SPEC-MCNC: SIGNIFICANT CHANGE UP
PLATELET # BLD AUTO: 365 K/UL — SIGNIFICANT CHANGE UP (ref 150–400)
POTASSIUM SERPL-MCNC: 5.2 MMOL/L — SIGNIFICANT CHANGE UP (ref 3.5–5.3)
POTASSIUM SERPL-SCNC: 5.2 MMOL/L — SIGNIFICANT CHANGE UP (ref 3.5–5.3)
PROT SERPL-MCNC: 7.8 G/DL — SIGNIFICANT CHANGE UP (ref 6–8.3)
PROTHROM AB SERPL-ACNC: 10.5 SEC — SIGNIFICANT CHANGE UP (ref 10–12.9)
RBC # BLD: 3.34 M/UL — LOW (ref 4.2–5.8)
RBC # FLD: 14.2 % — SIGNIFICANT CHANGE UP (ref 10.3–14.5)
SODIUM SERPL-SCNC: 136 MMOL/L — SIGNIFICANT CHANGE UP (ref 135–145)
TROPONIN I SERPL-MCNC: 0.02 NG/ML — SIGNIFICANT CHANGE UP (ref 0–0.04)
TROPONIN I SERPL-MCNC: <0.015 NG/ML — SIGNIFICANT CHANGE UP (ref 0–0.04)
WBC # BLD: 13.85 K/UL — HIGH (ref 3.8–10.5)
WBC # FLD AUTO: 13.85 K/UL — HIGH (ref 3.8–10.5)

## 2019-02-21 PROCEDURE — 99223 1ST HOSP IP/OBS HIGH 75: CPT | Mod: GC,AI

## 2019-02-21 PROCEDURE — 93010 ELECTROCARDIOGRAM REPORT: CPT

## 2019-02-21 PROCEDURE — 70450 CT HEAD/BRAIN W/O DYE: CPT | Mod: 26

## 2019-02-21 PROCEDURE — 74176 CT ABD & PELVIS W/O CONTRAST: CPT | Mod: 26

## 2019-02-21 PROCEDURE — 99285 EMERGENCY DEPT VISIT HI MDM: CPT | Mod: 25

## 2019-02-21 RX ORDER — CINACALCET 30 MG/1
30 TABLET, FILM COATED ORAL DAILY
Qty: 0 | Refills: 0 | Status: DISCONTINUED | OUTPATIENT
Start: 2019-02-21 | End: 2019-02-23

## 2019-02-21 RX ORDER — NICOTINE POLACRILEX 2 MG
1 GUM BUCCAL DAILY
Qty: 0 | Refills: 0 | Status: DISCONTINUED | OUTPATIENT
Start: 2019-02-21 | End: 2019-02-23

## 2019-02-21 RX ORDER — ONDANSETRON 8 MG/1
4 TABLET, FILM COATED ORAL EVERY 6 HOURS
Qty: 0 | Refills: 0 | Status: DISCONTINUED | OUTPATIENT
Start: 2019-02-21 | End: 2019-02-23

## 2019-02-21 RX ORDER — MIRTAZAPINE 45 MG/1
45 TABLET, ORALLY DISINTEGRATING ORAL AT BEDTIME
Qty: 0 | Refills: 0 | Status: DISCONTINUED | OUTPATIENT
Start: 2019-02-21 | End: 2019-02-23

## 2019-02-21 RX ORDER — ESCITALOPRAM OXALATE 10 MG/1
10 TABLET, FILM COATED ORAL DAILY
Qty: 0 | Refills: 0 | Status: DISCONTINUED | OUTPATIENT
Start: 2019-02-21 | End: 2019-02-23

## 2019-02-21 RX ORDER — ERYTHROPOIETIN 10000 [IU]/ML
4000 INJECTION, SOLUTION INTRAVENOUS; SUBCUTANEOUS
Qty: 0 | Refills: 0 | Status: DISCONTINUED | OUTPATIENT
Start: 2019-02-21 | End: 2019-02-23

## 2019-02-21 RX ORDER — GABAPENTIN 400 MG/1
400 CAPSULE ORAL
Qty: 0 | Refills: 0 | Status: DISCONTINUED | OUTPATIENT
Start: 2019-02-21 | End: 2019-02-23

## 2019-02-21 RX ORDER — INSULIN LISPRO 100/ML
VIAL (ML) SUBCUTANEOUS
Qty: 0 | Refills: 0 | Status: DISCONTINUED | OUTPATIENT
Start: 2019-02-21 | End: 2019-02-23

## 2019-02-21 RX ORDER — ACETAMINOPHEN 500 MG
1000 TABLET ORAL ONCE
Qty: 0 | Refills: 0 | Status: COMPLETED | OUTPATIENT
Start: 2019-02-21 | End: 2019-02-21

## 2019-02-21 RX ORDER — ONDANSETRON 8 MG/1
4 TABLET, FILM COATED ORAL ONCE
Qty: 0 | Refills: 0 | Status: COMPLETED | OUTPATIENT
Start: 2019-02-21 | End: 2019-02-21

## 2019-02-21 RX ORDER — HEPARIN SODIUM 5000 [USP'U]/ML
5000 INJECTION INTRAVENOUS; SUBCUTANEOUS EVERY 12 HOURS
Qty: 0 | Refills: 0 | Status: DISCONTINUED | OUTPATIENT
Start: 2019-02-21 | End: 2019-02-23

## 2019-02-21 RX ORDER — PANTOPRAZOLE SODIUM 20 MG/1
40 TABLET, DELAYED RELEASE ORAL
Qty: 0 | Refills: 0 | Status: DISCONTINUED | OUTPATIENT
Start: 2019-02-21 | End: 2019-02-23

## 2019-02-21 RX ORDER — SIMVASTATIN 20 MG/1
40 TABLET, FILM COATED ORAL AT BEDTIME
Qty: 0 | Refills: 0 | Status: DISCONTINUED | OUTPATIENT
Start: 2019-02-21 | End: 2019-02-23

## 2019-02-21 RX ORDER — QUETIAPINE FUMARATE 200 MG/1
200 TABLET, FILM COATED ORAL DAILY
Qty: 0 | Refills: 0 | Status: DISCONTINUED | OUTPATIENT
Start: 2019-02-21 | End: 2019-02-23

## 2019-02-21 RX ORDER — AMLODIPINE BESYLATE 2.5 MG/1
5 TABLET ORAL DAILY
Qty: 0 | Refills: 0 | Status: DISCONTINUED | OUTPATIENT
Start: 2019-02-21 | End: 2019-02-23

## 2019-02-21 RX ORDER — MORPHINE SULFATE 50 MG/1
6 CAPSULE, EXTENDED RELEASE ORAL ONCE
Qty: 0 | Refills: 0 | Status: DISCONTINUED | OUTPATIENT
Start: 2019-02-21 | End: 2019-02-21

## 2019-02-21 RX ORDER — SODIUM CHLORIDE 9 MG/ML
3 INJECTION INTRAMUSCULAR; INTRAVENOUS; SUBCUTANEOUS ONCE
Qty: 0 | Refills: 0 | Status: COMPLETED | OUTPATIENT
Start: 2019-02-21 | End: 2019-02-21

## 2019-02-21 RX ORDER — SEVELAMER CARBONATE 2400 MG/1
800 POWDER, FOR SUSPENSION ORAL
Qty: 0 | Refills: 0 | Status: DISCONTINUED | OUTPATIENT
Start: 2019-02-21 | End: 2019-02-23

## 2019-02-21 RX ADMIN — ERYTHROPOIETIN 4000 UNIT(S): 10000 INJECTION, SOLUTION INTRAVENOUS; SUBCUTANEOUS at 15:07

## 2019-02-21 RX ADMIN — MORPHINE SULFATE 6 MILLIGRAM(S): 50 CAPSULE, EXTENDED RELEASE ORAL at 08:12

## 2019-02-21 RX ADMIN — MORPHINE SULFATE 6 MILLIGRAM(S): 50 CAPSULE, EXTENDED RELEASE ORAL at 08:58

## 2019-02-21 RX ADMIN — Medication 400 MILLIGRAM(S): at 12:06

## 2019-02-21 RX ADMIN — SEVELAMER CARBONATE 800 MILLIGRAM(S): 2400 POWDER, FOR SUSPENSION ORAL at 17:38

## 2019-02-21 RX ADMIN — SIMVASTATIN 40 MILLIGRAM(S): 20 TABLET, FILM COATED ORAL at 23:04

## 2019-02-21 RX ADMIN — Medication 1000 MILLIGRAM(S): at 20:41

## 2019-02-21 RX ADMIN — ONDANSETRON 4 MILLIGRAM(S): 8 TABLET, FILM COATED ORAL at 09:00

## 2019-02-21 RX ADMIN — Medication 400 MILLIGRAM(S): at 20:20

## 2019-02-21 RX ADMIN — Medication 1000 MILLIGRAM(S): at 13:41

## 2019-02-21 RX ADMIN — ONDANSETRON 4 MILLIGRAM(S): 8 TABLET, FILM COATED ORAL at 20:24

## 2019-02-21 RX ADMIN — MIRTAZAPINE 45 MILLIGRAM(S): 45 TABLET, ORALLY DISINTEGRATING ORAL at 23:04

## 2019-02-21 RX ADMIN — SODIUM CHLORIDE 3 MILLILITER(S): 9 INJECTION INTRAMUSCULAR; INTRAVENOUS; SUBCUTANEOUS at 08:10

## 2019-02-21 NOTE — H&P ADULT - PROBLEM SELECTOR PLAN 2
-Likely 2/2 to Gastritis   -CT abdomen negative for any pathology. Lipase is ~400 could be due to nausea and vomiting . doesn't correlate with pancreatitis. -This could be due to Marijuana use and cannabinoid cyclic vomiting syndrome.   -Will check urine tox   -he never saw a GI physician  -Pain management consulted   -c/w PPI and Malox

## 2019-02-21 NOTE — ED ADULT NURSE NOTE - OBJECTIVE STATEMENT
pt came in w/ co L arm pain (dialysis port) and epigastric abdominal pain w/ vomiting. Pt dialysis days are sunday, tuesday, thursday. No active emesis at present. Abdomen is soft, ND/NT. No bleeding noted from dialysis port. Denies cp, sob, headache, dizziness.

## 2019-02-21 NOTE — CONSULT NOTE ADULT - ASSESSMENT
#ESRD seen on HD only had 2 and half ours of HD. High venous pressures with bleeding. Has outflow stenosis of AVF and needs angioplasty. Suggest discharge in AM and follow-up with Vascular surgery at access center tommorrow ( arrangement will be made by dialysis unit).  # Nausea and vomitting sec to uremia and cannabinoid use  # anemia of CKD. Procrit on HD  # HTN CONT AMLODIPINE  # RENAL OSTEODYSTROPHY. CONT SENSIPAR AND SEVELAMER  # bipolar disorder cont quetiapine    Many thanks for the consult

## 2019-02-21 NOTE — ED ADULT TRIAGE NOTE - CHIEF COMPLAINT QUOTE
biba with c/o passed out in the strecher,missed dialysis today as per ems also c/o pain to left arm, figer stick on scene was 146

## 2019-02-21 NOTE — H&P ADULT - NSHPSOCIALHISTORY_GEN_ALL_CORE
active smoker with no desire to quit smoking, approximately 2-3 packs per day for the last 15 years;, smokes marijuana, last use 1 month prior to admission as per patient, However on last admission he said he smoked 2 days ago and that was approximately less than a month. denies alcohol use

## 2019-02-21 NOTE — CONSULT NOTE ADULT - SUBJECTIVE AND OBJECTIVE BOX
[facility] : Progress Note :: [phone], (office [phone]),   Dr La / Dr Hui / Dr Barber / Dr Villalobos / Dr Hang CASTELLANO / Dr Mehta / Dr Sanchez / Dr Alber dumont  _____________________________________________________________________________________________  Patient is a 38y Male with history of ESRD on HD at at [facility] via  a LUEAVF. Last HD Tuesday. Since then has bleeding from AVF on and off. Called EMS sec to bleeding and in the ambulance had  a syncopal epidose. FBS was 75. Also complains of nausea and vomitting associated with epigastric pain for a week. Has a history of polysubtance abuse. Renal consulted for ESRD.    PAST MEDICAL & SURGICAL HISTORY:  Bipolar disorder  Schizophrenia  ESRD on dialysis  Morbid obesity with BMI of 40.0-44.9, adult  Diabetes  H/O hernia repair    aspirin (Swelling)  penicillins (Swelling)  shellfish (Unknown)    Home Medications Reviewed  Hospital Medications:   MEDICATIONS  (STANDING):  acetaminophen  IVPB .. 1000 milliGRAM(s) IV Intermittent once  amLODIPine   Tablet 5 milliGRAM(s) Oral daily  cinacalcet 30 milliGRAM(s) Oral daily  epoetin cyndie Injectable 4000 Unit(s) IV Push <User Schedule>  escitalopram 10 milliGRAM(s) Oral daily  gabapentin 400 milliGRAM(s) Oral two times a day  heparin  Injectable 5000 Unit(s) SubCutaneous every 12 hours  insulin lispro (HumaLOG) corrective regimen sliding scale   SubCutaneous three times a day before meals  mirtazapine 45 milliGRAM(s) Oral at bedtime  Nephro-mauricio 1 Tablet(s) Oral daily  nicotine - 21 mG/24Hr(s) Patch 1 patch Transdermal daily  pantoprazole    Tablet 40 milliGRAM(s) Oral before breakfast  QUEtiapine 200 milliGRAM(s) Oral daily  sevelamer hydrochloride 800 milliGRAM(s) Oral three times a day with meals  simvastatin 40 milliGRAM(s) Oral at bedtime    SOCIAL HISTORY:  Denies ETOh,Smoking,   FAMILY HISTORY:  Family history of diabetes mellitus (Mother)        VITALS:  T(F): 97.5 (02-21-19 @ 15:46), Max: 98.4 (02-21-19 @ 06:44)  HR: 85 (02-21-19 @ 15:46)  BP: 129/65 (02-21-19 @ 15:46)  RR: 18 (02-21-19 @ 15:46)  SpO2: 97% (02-21-19 @ 13:00)  Wt(kg): --    Height (cm): 182.88 (02-21 @ 06:44)  Weight (kg): 181.4 (02-21 @ 06:44)  BMI (kg/m2): 54.2 (02-21 @ 06:44)  BSA (m2): 2.86 (02-21 @ 06:44)    PHYSICAL EXAM:  Constitutional: Morbidly obese.  HEENT: anicteric sclera, oropharynx clear.  Neck: No JVD  Respiratory: CTAB, no wheezes, rales or rhonchi  Cardiovascular: S1, S2, RRR  Gastrointestinal: BS+, soft, NT/ND  Extremities:  No peripheral edema  Neurological: A/O x 3, no focal deficits  Vascular Access: LUEAVF in use on hemodialysis HD     LABS:  02-21    136  |  102  |  53<H>  ----------------------------<  96  5.2   |  23  |  10.30<H>    Ca    9.3      21 Feb 2019 08:08    TPro  7.8  /  Alb  2.8<L>  /  TBili  0.3  /  DBili      /  AST  10  /  ALT  16  /  AlkPhos  87  02-21    Creatinine Trend: 10.30 <--                        10.5   13.85 )-----------( 365      ( 21 Feb 2019 08:08 )             32.6     Urine Studies:      RADIOLOGY & ADDITIONAL STUDIES:

## 2019-02-21 NOTE — CONSULT NOTE ADULT - ASSESSMENT
Pain at L arm AV fistula/graft site. No evidence of active infection   1. Patient can safely use fistula for dialysis today  2. He can be seen by Dr. Foote as outpatient to further evaluate AVF/graft  3. D/w Dr. La re: HD today and f/u.  4. D/w Dr. Foote and agreed.

## 2019-02-21 NOTE — H&P ADULT - NSHPPHYSICALEXAM_GEN_ALL_CORE
PHYSICAL EXAM:  GENERAL: NAD  HEENT: Normocephalic;  conjunctivae and sclerae clear; moist mucous membranes;   NECK : supple  CHEST/LUNG: Clear to auscultation bilaterally with good air entry   HEART: S1 S2  regular; no murmurs, gallops or rubs  ABDOMEN: Soft, Nontender, Nondistended; Bowel sounds present  EXTREMITIES: no cyanosis; no edema; no calf tenderness, LUE AV fistula +thrill + bruit  SKIN: warm and dry; no rash  NERVOUS SYSTEM:  Awake and alert; Oriented  to place, person and time ; no new deficits

## 2019-02-21 NOTE — H&P ADULT - ATTENDING COMMENTS
Patient seen and examined at bedside, He is known to me from prior admission to be malingering, manipulative and has an abusive threatening behaviors. He faked to be on Dilaudid at home and ended up by being on it during his hospital stay.    Today he comes in with different stories. IN ED and Vascular surgery notes, he stated pain at the fistula site, now he is telling me it was bleeding since midnight and pointing to a dot on the fistula as the site of bleed.  He also claims to have epigastric pain, says very bad 9/10 in severity, and angry that he was not given Dilaudid.   He did not mention anything about fainting or passing out in ambulance except when I asked him. He says initially does not know anything from the time he called EMS, then he changes story that he went downstairs to wait for them. Patient seen and examined at bedside, He is known to me from prior admission to be malingering, manipulative and has an abusive threatening behaviors. He faked to be on Dilaudid at home and ended up by being on it during his hospital stay.    Today he comes in with different stories. IN ED and Vascular surgery notes, he stated pain at the fistula site, now he is telling me it was bleeding since midnight and pointing to a dot on the fistula as the site of bleed.  He also claims to have epigastric pain, says very bad 9/10 in severity, and angry that he was not given Dilaudid.   He did not mention anything about fainting or passing out in ambulance except when I asked him. He says initially does not know anything from the time he called EMS, then he changes story that he went downstairs to wait for them. He states feeling lightheaded and severe epigastric pain. ? vasovagal in origin. When asked about his sugar he says it was 75 right before climbing to the ambulance.   he also says he takes his insulin when it's low, and that he is the one managing his sugars. Also that his doctor when he told her about that, she held his insulin x 1 week.  Physical exam:  Vital Signs Last 24 Hrs  T(C): 36.7 (21 Feb 2019 11:40), Max: 36.9 (21 Feb 2019 06:44)  T(F): 98 (21 Feb 2019 11:40), Max: 98.4 (21 Feb 2019 06:44)  HR: 88 (21 Feb 2019 11:40) (87 - 88)  BP: 148/80 (21 Feb 2019 11:40) (148/80 - 160/95)  BP(mean): --  RR: 16 (21 Feb 2019 11:40) (16 - 16)  SpO2: 97% (21 Feb 2019 11:40) (96% - 97%)  General: Morbidly obese man  HEENT: Neck supple  Heart: S1 S2 normal,   Abdomen: NT< ND  chest: Clear  LE: No LE edema  A/P  1- syncope: most likely vasovagal, metabolic secondary to low sugar or drug induced.   Drug urine screen ordered.   EKG: NSR  will do ECHO to rule out structural causes  TEle x24 hour then DC   Suspecting patient was faking his syncope. was oriented x3 on admission. BS: 143.     2- Epigastric pain: last admission, patient had left lower quadrant abdominal pain  Says made an appt with GI never went to see.   Place [facility]    3- ESRD: patient is not overloaded, will go to dialysis soon  His appt is 5 am but he arrived to the hospital at 6:40 am    Rest as above. Patient seen and examined at bedside, He is known to me from prior admission to be malingering, manipulative and has an abusive threatening behaviors. He faked to be on Dilaudid at home and ended up by being on it during his hospital stay.    Today he comes in with different stories. IN ED and Vascular surgery notes, he stated pain at the fistula site, now he is telling me it was bleeding since midnight and pointing to a dot on the fistula as the site of bleed.  He also claims to have epigastric pain, says very bad 9/10 in severity, and angry that he was not given Dilaudid.   He did not mention anything about fainting or passing out in ambulance except when I asked him. He says initially does not know anything from the time he called EMS, then he changes story that he went downstairs to wait for them. He states feeling lightheaded and severe epigastric pain. ? vasovagal in origin. When asked about his sugar he says it was 75 right before climbing to the ambulance.   he also says he takes his insulin when it's low, and that he is the one managing his sugars. Also that his doctor when he told her about that, she held his insulin x 1 week.  Physical exam:  Vital Signs Last 24 Hrs  T(C): 36.7 (21 Feb 2019 11:40), Max: 36.9 (21 Feb 2019 06:44)  T(F): 98 (21 Feb 2019 11:40), Max: 98.4 (21 Feb 2019 06:44)  HR: 88 (21 Feb 2019 11:40) (87 - 88)  BP: 148/80 (21 Feb 2019 11:40) (148/80 - 160/95)  BP(mean): --  RR: 16 (21 Feb 2019 11:40) (16 - 16)  SpO2: 97% (21 Feb 2019 11:40) (96% - 97%)  General: Morbidly obese man  HEENT: Neck supple  Heart: S1 S2 normal,   Abdomen: NT< ND  chest: Clear  LE: No LE edema  A/P  1- syncope: most likely vasovagal, metabolic secondary to low sugar or drug induced.   Drug urine screen ordered.   EKG: NSR  will do ECHO to rule out structural causes  TEle x24 hour then DC   Suspecting patient was faking his syncope. was oriented x3 on admission. BS: 143.     2- Epigastric pain: last admission, patient had left lower quadrant abdominal pain  Says made an appt with GI never went to see.   Place PPI, Maalox    3- Intractable vomiting: patient had history of Marijuana abuse before but he says he no longer did in 1 jeremiah.   Will f.u Drug urine screen.  If positive then he may be having cannabinoid hyperemesis syndrome.     4- ESRD: patient is not overloaded, will go to dialysis soon  His appt is 5 am but he arrived to the hospital at 6:40 am    Rest as above.

## 2019-02-21 NOTE — H&P ADULT - PROBLEM SELECTOR PLAN 3
-Missed HD today  -c/w HD TTS  -c/o bleeding from fistula site. examination no active bleeding noted.  -vascular surgery consulted and recommendations noted.   -Social work consult

## 2019-02-21 NOTE — ED ADULT NURSE NOTE - NSIMPLEMENTINTERV_GEN_ALL_ED
Implemented All Fall Risk Interventions:  [place] to call system. Call bell, personal items and telephone within reach. Instruct patient to call for assistance. Room bathroom lighting operational. Non-slip footwear when patient is off stretcher. Physically safe environment: no spills, clutter or unnecessary equipment. Stretcher in lowest position, wheels locked, appropriate side rails in place. Provide visual cue, wrist band, yellow gown, etc. Monitor gait and stability. Monitor for mental status changes and reorient to person, place, and time. Review medications for side effects contributing to fall risk. Reinforce activity limits and safety measures with patient and family.

## 2019-02-21 NOTE — CONSULT NOTE ADULT - SUBJECTIVE AND OBJECTIVE BOX
Vascular Surgery  Consultation Note    Patient is a 38y old  Male who presents with a chief complaint of pain at fistula site    HPI:  37 yo M PMH ESRD s/p Left arm fistula x3  last with graft, bipolar disorder, schizophrenia presents to ED c/o pain at Left arm fistula site.  Pt states he developed severe pain at the upper portion of the fistula.  He stated he had some bleeding from the same area after dialysis on Tuesday which has since resolved. He denies having any fever or chills with HD.  Pt reportedly with complaints of abdominal pain and witnessed syncopal episode in ambulance. Pt follows with [facility] [phone], (office [phone]).    PAST MEDICAL & SURGICAL HISTORY:  Bipolar disorder  Schizophrenia  ESRD on dialysis  Morbid obesity with BMI of 40.0-44.9, adult  Diabetes  H/O hernia repair      Allergies    aspirin (Swelling)  penicillins (Swelling)  shellfish (Unknown)    Vital Signs Last 24 Hrs  T(C): 36.9 (21 Feb 2019 06:44), Max: 36.9 (21 Feb 2019 06:44)  T(F): 98.4 (21 Feb 2019 06:44), Max: 98.4 (21 Feb 2019 06:44)  HR: 87 (21 Feb 2019 06:44) (87 - 87)  BP: 160/95 (21 Feb 2019 06:44) (160/95 - 160/95)  BP(mean): --  RR: 16 (21 Feb 2019 06:44) (16 - 16)  SpO2: 96% (21 Feb 2019 06:44) (96% - 96%)    Physical Exam:  Gen: awake, alert oriented NAD  HEENT: normocephalic  Ext: b/l LE Warm to touch. No swelling or erythema  Vasc: L arm fistula/graft with aneurysmal portions.  + Tenderness with palpation at cannulation site at upper portion of aneurysmal fistula. NO ulceration, No erythema, no purulence.     Labs:                          10.5   13.85 )-----------( 365      ( 21 Feb 2019 08:08 )             32.6     02-21    136  |  102  |  53<H>  ----------------------------<  96  5.2   |  23  |  10.30<H>    Ca    9.3      21 Feb 2019 08:08    TPro  7.8  /  Alb  2.8<L>  /  TBili  0.3  /  DBili  x   /  AST  10  /  ALT  16  /  AlkPhos  87  02-21    PT/INR - ( 21 Feb 2019 08:08 )   PT: 10.5 sec;   INR: 0.95 ratio         PTT - ( 21 Feb 2019 08:08 )  PTT:36.0 sec

## 2019-02-21 NOTE — ED PROVIDER NOTE - PHYSICAL EXAMINATION
Left arm AV fistula, + bruits and thrills, pt states pain with palpation to fistula. No active bleeding.

## 2019-02-21 NOTE — H&P ADULT - PROBLEM SELECTOR PLAN 1
-Patient reports feeling dizzy and then became unresponsive.  -No EMS report documentation of syncope.  -denied any nausea, vomiting or diaphoresis during the episode. However he had pain and his fingersticks were 75 before he got into ambulance.  -Likely vasovagal or secondary to hypoglycemia.   -CT head negative for any new stroke or bleed, No neurological deficit  -BS now 143  -will check orthostatics   -f/u echocardiogram   -trend troponins x 2  -telemonitoring x 24 then discontinue if no events   -EKG is NSR @ 95b/min

## 2019-02-21 NOTE — ED PROVIDER NOTE - PROGRESS NOTE DETAILS
ADDENDUM by Axel Yeh M.D.: I received sign-off from Dr. LATRELL Reyes. Pt with pain to graft site, epigastric pain, and syncope in EMS, I was to f/u labs, CT, and vasc c/s. Vasc c/s as below, states no issues. Abdominal exam for me is benign, given Zofran and tolerating ice chips so far with improvement. Pt states epigastric pain and vomiting every morning x 3 days. ECG/labs unremarkable. D/w vascular consult Jennifer szymanski d/w vasculary attending and his nephrologist Abhinav who is requesting medicine admission for dialysis today, and that there is no issue with graft and can be used for dialysis. PMD: Ryan

## 2019-02-21 NOTE — H&P ADULT - HISTORY OF PRESENT ILLNESS
37 y/o M patient, on dialysis (TTS non compliant with HD), with a significant PMHx of DM, Bipolar, schizophrenia, ESRD, morbid obesity and significant PSHx of history of hernia repair presents to the ED with Bleeding from fistula site ,nausea, vomiting and severe epigastric abdominal pain. Pain started at 12:00 am mid night. Patient reports that he has been having nausea and vomiting for 1 week. His last meal was on Tuesday. Patient also reports that he noted to have bleeding from his fistula site. Patient then called EMS. when he was waiting for EMS his checked his finger stick was 75. Patient then reports syncopal episode in ambulance. He doesn't remember for how many minutes he was unresponsive. Patient is been managing his own insulin and takes insulin when his finger stick is ''90''. Patient said he takes it when his finger sticks are low and patient's PCP held his insulin x 1 week as well. Patient denies any diarrhea, shortness of breath, chest pain. All other ROS including chest pain and shortness of breath are negative.   On Resident evaluation patient is AAO x 3 playing on his phone, lying comfortable on bed. on further questioning he specifically asked for pain medications and requested Dilaudid. Patient is been to [facility] multiple times for missed HD, abdominal pain and been evaluated by pain management for abdominal pain. Patient doesn't have any requirement of opioids and on explaining that to him, he became aggressive and told the nurse that ''he has a history of hitting physician in past if he doesn't get his requested pain regime''.  In Ed his vitals are stable. labs are unremarkable. EKG is NSR @95 b/min. CT abdomen and CT head are unremarkable.

## 2019-02-21 NOTE — ED PROVIDER NOTE - OBJECTIVE STATEMENT
Pt with following Chief complaints:  1. Pain to left arm AV fistula site at 12am sudden onset associated with mild bleeding.  2. Midepigastric pain also starting at 12am  3. Headache, slow progressive onset associated with sensation that room is spinning.  4. Ambulance states pt became unresponsive during transport.  On evaluation pt is A&O x 3, no focal deficits.  Pt states is due for HD today. nephrologist is Dr. Mauricio. Pt gets HD on T/Th/Sat at [place].

## 2019-02-21 NOTE — H&P ADULT - ASSESSMENT
39 y/o M patient, on dialysis (TTS non compliant with HD), with a significant PMHx of DM, Bipolar, schizophrenia, ESRD, morbid obesity and significant PSHx of history of hernia repair presents to the ED with Bleeding from fistula site ,nausea, vomiting and severe epigastric abdominal pain. Pain started at 12:00 am mid night. Patient reports that he has been having nausea and vomiting for 1 week. His last meal was on Tuesday. Patient also reports that he noted to have bleeding from his fistula site. Patient then called EMS. when he was waiting for EMS his checked his finger stick was 75. Patient then reports syncopal episode in ambulance. Admitted for evaluation of Syncope and missed HD.

## 2019-02-22 ENCOUNTER — TRANSCRIPTION ENCOUNTER (OUTPATIENT)
Age: 39
End: 2019-02-22

## 2019-02-22 VITALS
HEART RATE: 94 BPM | RESPIRATION RATE: 18 BRPM | DIASTOLIC BLOOD PRESSURE: 84 MMHG | SYSTOLIC BLOOD PRESSURE: 127 MMHG | OXYGEN SATURATION: 96 % | TEMPERATURE: 98 F

## 2019-02-22 DIAGNOSIS — T82.858A STENOSIS OF OTHER VASCULAR PROSTHETIC DEVICES, IMPLANTS AND GRAFTS, INITIAL ENCOUNTER: ICD-10-CM

## 2019-02-22 LAB
HBA1C BLD-MCNC: 6.6 % — HIGH (ref 4–5.6)
INR BLD: 1.02 RATIO — SIGNIFICANT CHANGE UP (ref 0.88–1.16)
PROTHROM AB SERPL-ACNC: 11.3 SEC — SIGNIFICANT CHANGE UP (ref 10–12.9)

## 2019-02-22 PROCEDURE — 99261: CPT

## 2019-02-22 PROCEDURE — 84484 ASSAY OF TROPONIN QUANT: CPT

## 2019-02-22 PROCEDURE — 82550 ASSAY OF CK (CPK): CPT

## 2019-02-22 PROCEDURE — 80053 COMPREHEN METABOLIC PANEL: CPT

## 2019-02-22 PROCEDURE — 85027 COMPLETE CBC AUTOMATED: CPT

## 2019-02-22 PROCEDURE — 93306 TTE W/DOPPLER COMPLETE: CPT

## 2019-02-22 PROCEDURE — 85610 PROTHROMBIN TIME: CPT

## 2019-02-22 PROCEDURE — 36415 COLL VENOUS BLD VENIPUNCTURE: CPT

## 2019-02-22 PROCEDURE — 70450 CT HEAD/BRAIN W/O DYE: CPT

## 2019-02-22 PROCEDURE — 85730 THROMBOPLASTIN TIME PARTIAL: CPT

## 2019-02-22 PROCEDURE — 80307 DRUG TEST PRSMV CHEM ANLYZR: CPT

## 2019-02-22 PROCEDURE — 74176 CT ABD & PELVIS W/O CONTRAST: CPT

## 2019-02-22 PROCEDURE — 93005 ELECTROCARDIOGRAM TRACING: CPT

## 2019-02-22 PROCEDURE — 99285 EMERGENCY DEPT VISIT HI MDM: CPT | Mod: 25

## 2019-02-22 PROCEDURE — 99239 HOSP IP/OBS DSCHRG MGMT >30: CPT

## 2019-02-22 PROCEDURE — 83036 HEMOGLOBIN GLYCOSYLATED A1C: CPT

## 2019-02-22 PROCEDURE — 82553 CREATINE MB FRACTION: CPT

## 2019-02-22 PROCEDURE — 82962 GLUCOSE BLOOD TEST: CPT

## 2019-02-22 PROCEDURE — 83690 ASSAY OF LIPASE: CPT

## 2019-02-22 RX ORDER — ACETAMINOPHEN 500 MG
1000 TABLET ORAL ONCE
Qty: 0 | Refills: 0 | Status: COMPLETED | OUTPATIENT
Start: 2019-02-22 | End: 2019-02-22

## 2019-02-22 RX ADMIN — ESCITALOPRAM OXALATE 10 MILLIGRAM(S): 10 TABLET, FILM COATED ORAL at 10:28

## 2019-02-22 RX ADMIN — SEVELAMER CARBONATE 800 MILLIGRAM(S): 2400 POWDER, FOR SUSPENSION ORAL at 10:25

## 2019-02-22 RX ADMIN — Medication 1 TABLET(S): at 10:43

## 2019-02-22 RX ADMIN — GABAPENTIN 400 MILLIGRAM(S): 400 CAPSULE ORAL at 05:50

## 2019-02-22 RX ADMIN — Medication 400 MILLIGRAM(S): at 03:03

## 2019-02-22 RX ADMIN — AMLODIPINE BESYLATE 5 MILLIGRAM(S): 2.5 TABLET ORAL at 05:49

## 2019-02-22 RX ADMIN — PANTOPRAZOLE SODIUM 40 MILLIGRAM(S): 20 TABLET, DELAYED RELEASE ORAL at 05:50

## 2019-02-22 RX ADMIN — QUETIAPINE FUMARATE 200 MILLIGRAM(S): 200 TABLET, FILM COATED ORAL at 10:43

## 2019-02-22 RX ADMIN — Medication 1000 MILLIGRAM(S): at 03:21

## 2019-02-22 RX ADMIN — CINACALCET 30 MILLIGRAM(S): 30 TABLET, FILM COATED ORAL at 10:28

## 2019-02-22 RX ADMIN — HEPARIN SODIUM 5000 UNIT(S): 5000 INJECTION INTRAVENOUS; SUBCUTANEOUS at 05:50

## 2019-02-22 NOTE — DISCHARGE NOTE ADULT - MEDICATION SUMMARY - MEDICATIONS TO TAKE
I will START or STAY ON the medications listed below when I get home from the hospital:    Neurontin 400 mg oral capsule  -- 1 cap(s) by mouth 2 times a day  -- Indication: For neuropathy    mirtazapine 45 mg oral tablet  -- 1 tab(s) by mouth once a day  -- Indication: For Schizophrenia    escitalopram 10 mg oral tablet  -- 1 tab(s) by mouth once a day  -- Indication: For Depression    NovoLOG 100 units/mL subcutaneous solution  -- 5 unit(s) subcutaneous 3 times a day  -- Indication: For Diabetes    Lantus 100 units/mL subcutaneous solution  -- 15 unit(s) subcutaneous once a day (at bedtime)  -- Indication: For Diabetes    simvastatin 40 mg oral tablet  -- 1 tab(s) by mouth once a day (at bedtime)  -- Indication: For Prophylactic measure    QUEtiapine 100 mg oral tablet  -- 2 tab(s) by mouth once a day  -- Indication: For Schizophrenia    Sensipar 30 mg oral tablet  -- 1 tab(s) by mouth once a day  -- Indication: For ESRD on hemodialysis    amLODIPine 5 mg oral tablet  -- 1 tab(s) by mouth once a day  -- Indication: For Hypertension    raNITIdine 150 mg oral tablet  -- 1 tab(s) by mouth 2 times a day, As Needed  -- Indication: For Prophylactic measure    polyethylene glycol 3350 oral powder for reconstitution  -- 17 gram(s) by mouth once a day  -- Indication: For Prophylactic measure    Renvela 800 mg oral tablet  -- 2 tab(s) by mouth 3 times a day (with meals)  -- Indication: For ESRD on hemodialysis    omeprazole 20 mg oral delayed release capsule  -- 1 cap(s) by mouth once a day  -- Indication: For Prophylactic measure    Nicoderm C-Q 21 mg/24 hr transdermal film, extended release  -- 1 patch transdermally once a day   -- Do not take this drug if you are pregnant.  For external use only.  It is very important that you take or use this exactly as directed.  Do not skip doses or discontinue unless directed by your doctor.  Remove old patch prior to applying a new patch.    -- Indication: For Prophylactic measure    Nephro-Oly oral tablet  -- 1 tab(s) by mouth once a day  -- Indication: For ESRD on hemodialysis I will START or STAY ON the medications listed below when I get home from the hospital:    Neurontin 400 mg oral capsule  -- 1 cap(s) by mouth 2 times a day  -- Indication: For Prophylactic measure    mirtazapine 45 mg oral tablet  -- 1 tab(s) by mouth once a day  -- Indication: For Schizophrenia    escitalopram 10 mg oral tablet  -- 1 tab(s) by mouth once a day  -- Indication: For Schizophrenia    NovoLOG 100 units/mL subcutaneous solution  -- 5 unit(s) subcutaneous 3 times a day  -- Indication: For Diabetes    Lantus 100 units/mL subcutaneous solution  -- 15 unit(s) subcutaneous once a day (at bedtime)  -- Indication: For Diabetes    simvastatin 40 mg oral tablet  -- 1 tab(s) by mouth once a day (at bedtime)  -- Indication: For Hld    QUEtiapine 100 mg oral tablet  -- 2 tab(s) by mouth once a day  -- Indication: For Schizophrenia    Sensipar 30 mg oral tablet  -- 1 tab(s) by mouth once a day  -- Indication: For ESRD on hemodialysis    amLODIPine 5 mg oral tablet  -- 1 tab(s) by mouth once a day  -- Indication: For Hypertension    raNITIdine 150 mg oral tablet  -- 1 tab(s) by mouth 2 times a day, As Needed  -- Indication: For Prophylactic measure    polyethylene glycol 3350 oral powder for reconstitution  -- 17 gram(s) by mouth once a day  -- Indication: For Prophylactic measure    Renvela 800 mg oral tablet  -- 2 tab(s) by mouth 3 times a day (with meals)  -- Indication: For ESRD on hemodialysis    omeprazole 20 mg oral delayed release capsule  -- 1 cap(s) by mouth once a day  -- Indication: For Prophylactic measure    Nicoderm C-Q 21 mg/24 hr transdermal film, extended release  -- 1 patch transdermally once a day   -- Do not take this drug if you are pregnant.  For external use only.  It is very important that you take or use this exactly as directed.  Do not skip doses or discontinue unless directed by your doctor.  Remove old patch prior to applying a new patch.    -- Indication: For Prophylactic measure    Nephro-Oly oral tablet  -- 1 tab(s) by mouth once a day  -- Indication: For ESRD on hemodialysis

## 2019-02-22 NOTE — DISCHARGE NOTE ADULT - COMMUNITY RESOURCES
HD at [facility] ([facility]); outpatient schedule T, Th, Sa resumed 02/22/2019 for Saturday 02/23/2019  PHONE [phone]; fax [phone]

## 2019-02-22 NOTE — DISCHARGE NOTE ADULT - CARE PROVIDER_API CALL
Isac La)  Internal Medicine; Nephrology  [address] [place] [address]  Phone: [phone]  Fax: [phone]  Follow Up Time:

## 2019-02-22 NOTE — DISCHARGE NOTE ADULT - CARE PLAN
Principal Discharge DX:	Abdominal pain  Goal:	remain stable  Assessment and plan of treatment:	you were admitted for complaints of nausea, vomiting and abdominal pain.  CT scan of abdomen was negative. your symptoms resolved. follow up with PCP within 1 week  Secondary Diagnosis:	Diabetes  Assessment and plan of treatment:	HgA1C this admission.  Make sure you get your HgA1c checked every three months.  If you take oral diabetes medications, check your blood glucose two times a day.  If you take insulin, check your blood glucose before meals and at bedtime.  It's important not to skip any meals.  Keep a log of your blood glucose results and always take it with you to your doctor appointments.  Keep a list of your current medications including injectables and over the counter medications and bring this medication list with you to all your doctor appointments.  If you have not seen your ophthalmologist this year call for appointment.  Check your feet daily for redness, sores, or openings. Do not self treat. If no improvement in two days call your primary care physician for an appointment.  Low blood sugar (hypoglycemia) is a blood sugar below 70mg/dl. Check your blood sugar if you feel signs/symptoms of hypoglycemia. If your blood sugar is below 70 take 15 grams of carbohydrates (ex 4 oz of apple juice, 3-4 glucose tablets, or 4-6 oz of regular soda) wait 15 minutes and repeat blood sugar to make sure it comes up above 70.  If your blood sugar is above 70 and you are due for a meal, have a meal.  If you are not due for a meal have a snack.  This snack helps keeps your blood sugar at a safe range.  Secondary Diagnosis:	Hypertension  Assessment and plan of treatment:	Low salt diet  Activity as tolerated.  Take all medication as prescribed.  Follow up with your medical doctor for routine blood pressure monitoring at your next visit.  Notify your doctor if you have any of the following symptoms:   Dizziness, Lightheadedness, Blurry vision, Headache, Chest pain, Shortness of breath  Secondary Diagnosis:	ESRD on hemodialysis  Assessment and plan of treatment:	continue dialysis on TTS. do not miss dialysis days  Secondary Diagnosis:	Schizophrenia  Assessment and plan of treatment:	continue current medications Principal Discharge DX:	Abdominal pain  Goal:	remain stable  Assessment and plan of treatment:	you were admitted for complaints of nausea, vomiting and abdominal pain.  CT scan of abdomen was negative. your symptoms resolved. follow up with PCP within 1 week  Secondary Diagnosis:	Diabetes  Assessment and plan of treatment:	HgA1C this admission.  Make sure you get your HgA1c checked every three months.  If you take oral diabetes medications, check your blood glucose two times a day.  If you take insulin, check your blood glucose before meals and at bedtime.  It's important not to skip any meals.  Keep a log of your blood glucose results and always take it with you to your doctor appointments.  Keep a list of your current medications including injectables and over the counter medications and bring this medication list with you to all your doctor appointments.  If you have not seen your ophthalmologist this year call for appointment.  Check your feet daily for redness, sores, or openings. Do not self treat. If no improvement in two days call your primary care physician for an appointment.  Low blood sugar (hypoglycemia) is a blood sugar below 70mg/dl. Check your blood sugar if you feel signs/symptoms of hypoglycemia. If your blood sugar is below 70 take 15 grams of carbohydrates (ex 4 oz of apple juice, 3-4 glucose tablets, or 4-6 oz of regular soda) wait 15 minutes and repeat blood sugar to make sure it comes up above 70.  If your blood sugar is above 70 and you are due for a meal, have a meal.  If you are not due for a meal have a snack.  This snack helps keeps your blood sugar at a safe range.  Secondary Diagnosis:	Hypertension  Assessment and plan of treatment:	Low salt diet  Activity as tolerated.  Take all medication as prescribed.  Follow up with your medical doctor for routine blood pressure monitoring at your next visit.  Notify your doctor if you have any of the following symptoms:   Dizziness, Lightheadedness, Blurry vision, Headache, Chest pain, Shortness of breath  Secondary Diagnosis:	ESRD on hemodialysis  Assessment and plan of treatment:	continue dialysis on TTS. do not miss dialysis days  your fistula has some stenosis/ blockage. you need and angioplasty of fistula. Dr. La's office will call you with the appt for the procedure. please follow directions and appointment  Secondary Diagnosis:	Schizophrenia  Assessment and plan of treatment:	continue current medications

## 2019-02-22 NOTE — DISCHARGE NOTE ADULT - HOSPITAL COURSE
39 y/o M patient, on dialysis (TTS non compliant with HD), with a significant PMHx of DM, Bipolar, schizophrenia, ESRD, morbid obesity and significant PSHx of history of hernia repair presents to the ED with Bleeding from fistula site ,nausea, vomiting and severe epigastric abdominal pain. Pain started at 12:00 am mid night. Patient reports that he has been having nausea and vomiting for 1 week. His last meal was on Tuesday. Patient also reports that he noted to have bleeding from his fistula site. Patient then called EMS. when he was waiting for EMS his checked his finger stick was 75. Patient then reports syncopal episode in ambulance. He doesn't remember for how many minutes he was unresponsive. Patient is been managing his own insulin and takes insulin when his finger stick is ''90''. Patient said he takes it when his finger sticks are low and patient's PCP held his insulin x 1 week as well. Patient denies any diarrhea, shortness of breath, chest pain. All other ROS including chest pain and shortness of breath are negative.    In Ed his vitals are stable. labs are unremarkable. EKG is NSR @95 b/min. CT abdomen and CT head are unremarkable.     - ACS was ruled out Trop x2 negative   -able to tolerated PO diet. medically stable for discharge

## 2019-02-22 NOTE — PROGRESS NOTE ADULT - SUBJECTIVE AND OBJECTIVE BOX
[facility] : Progress Note :: [phone], (office [phone]),   Dr La / Dr Hui / Dr Barber / Dr Villalobos / Dr Hang CASTELLANO / Dr Mehta / Dr Sanchez / Dr Alber dumont  _____________________________________________________________________________________________  s/p HD yesterday. No complains.    aspirin (Swelling)  penicillins (Swelling)  shellfish (Unknown)    Hospital Medications:   MEDICATIONS  (STANDING):  amLODIPine   Tablet 5 milliGRAM(s) Oral daily  cinacalcet 30 milliGRAM(s) Oral daily  epoetin cyndie Injectable 4000 Unit(s) IV Push <User Schedule>  escitalopram 10 milliGRAM(s) Oral daily  gabapentin 400 milliGRAM(s) Oral two times a day  heparin  Injectable 5000 Unit(s) SubCutaneous every 12 hours  insulin lispro (HumaLOG) corrective regimen sliding scale   SubCutaneous three times a day before meals  mirtazapine 45 milliGRAM(s) Oral at bedtime  Nephro-mauricio 1 Tablet(s) Oral daily  nicotine - 21 mG/24Hr(s) Patch 1 patch Transdermal daily  pantoprazole    Tablet 40 milliGRAM(s) Oral before breakfast  QUEtiapine 200 milliGRAM(s) Oral daily  sevelamer hydrochloride 800 milliGRAM(s) Oral three times a day with meals  simvastatin 40 milliGRAM(s) Oral at bedtime        VITALS:  T(F): 97.8 (02-22-19 @ 11:22), Max: 98.6 (02-21-19 @ 23:48)  HR: 85 (02-22-19 @ 11:22)  BP: 156/102 (02-22-19 @ 11:22)  RR: 18 (02-22-19 @ 11:22)  SpO2: 99% (02-22-19 @ 11:22)  Wt(kg): --      PHYSICAL EXAM:  Constitutional: NAD  HEENT: anicteric sclera, oropharynx man  Neck: No JVD  Respiratory: CTAB, no wheezes, rales or rhonchi  Cardiovascular: S1, S2, RRR  Gastrointestinal: BS+, soft, NT/ND  Extremities: No cyanosis or clubbing.  peripheral edema++  Neurological: A/O x 3, no focal deficits  Vascular Access: LUEAVF with thrill and bruit    LABS:  02-21    136  |  102  |  53<H>  ----------------------------<  96  5.2   |  23  |  10.30<H>    Ca    9.3      21 Feb 2019 08:08    TPro  7.8  /  Alb  2.8<L>  /  TBili  0.3  /  DBili      /  AST  10  /  ALT  16  /  AlkPhos  87  02-21    Creatinine Trend: 10.30 <--                        10.5   13.85 )-----------( 365      ( 21 Feb 2019 08:08 )             32.6     Urine Studies:      RADIOLOGY & ADDITIONAL STUDIES:
Patient is a 38y old  Male who presents with a chief complaint of syncope nausea and vomiting (22 Feb 2019 14:15)    INTERVAL HPI/OVERNIGHT EVENTS:  Patient was seen and examined at bedside feels ok  No tele events on monitor  I discussed with him that he will be scheduled procedure outpatient to fix his fistula as it's stenotic, and the outpatient dialysis center will contact him to arrange. He verbalized understanding.     Allergies    aspirin (Swelling)  penicillins (Swelling)  shellfish (Unknown)    Intolerances        REVIEW OF SYSTEMS:  CONSTITUTIONAL: No fever, weight loss, or fatigue  EYES: No eye pain, visual disturbances, or discharge  RESPIRATORY: No cough, wheezing or shortness of breath  CARDIOVASCULAR: No chest pain, feeling of heart beats  GASTROINTESTINAL: Epigastric pain, vomiting  GENITOURINARY: No dysuria, frequency, hematuria  NEUROLOGICAL: No headaches  MUSCULOSKELETAL: No joint pain  PSYCHIATRIC: No depression or anxiety  HEME/LYMPH: No easy bruising, or bleeding gums  ALLERGY AND IMMUNOLOGIC: No hives or eczema    Medications:  amLODIPine   Tablet 5 milliGRAM(s) Oral daily  cinacalcet 30 milliGRAM(s) Oral daily  escitalopram 10 milliGRAM(s) Oral daily  gabapentin 400 milliGRAM(s) Oral two times a day  heparin  Injectable 5000 Unit(s) SubCutaneous every 12 hours  insulin lispro (HumaLOG) corrective regimen sliding scale   SubCutaneous three times a day before meals  mirtazapine 45 milliGRAM(s) Oral at bedtime  Nephro-mauricio 1 Tablet(s) Oral daily  ondansetron Injectable 4 milliGRAM(s) IV Push every 6 hours PRN Nausea and/or Vomiting  pantoprazole    Tablet 40 milliGRAM(s) Oral before breakfast  QUEtiapine 200 milliGRAM(s) Oral daily  simvastatin 40 milliGRAM(s) Oral at bedtime      PHYSICAL EXAM:  Vital Signs Last 24 Hrs  T(C): 36.6 (22 Feb 2019 11:22), Max: 37 (21 Feb 2019 23:48)  T(F): 97.8 (22 Feb 2019 11:22), Max: 98.6 (21 Feb 2019 23:48)  HR: 85 (22 Feb 2019 11:22) (71 - 85)  BP: 156/102 (22 Feb 2019 11:22) (129/65 - 156/102)  BP(mean): --  RR: 18 (22 Feb 2019 11:22) (18 - 18)  SpO2: 99% (22 Feb 2019 11:22) (93% - 99%)    GENERAL: NAD, morbid obesity  HEAD:  Atraumatic, Normocephalic  EYES: EOMI, PERRLA, conjunctiva and sclera clear  ENT: moist mucous membranes  NECK: Supple, No JVD, Normal thyroid  NERVOUS SYSTEM:  Alert & Oriented X3, Good concentration; Motor Strength 5/5 B/L upper and lower extremities; DTRs 2+ intact and symmetric  CHEST/LUNG: No rales, rhonchi, wheezing, or rubs  HEART: Regular rate and rhythm; No murmurs, rubs, or gallops  ABDOMEN: Soft, Nontender, Nondistended; Bowel sounds present  EXTREMITIES:  2+ Peripheral Pulses, No clubbing, cyanosis, or edema  SKIN: No rashes or lesions    LABS:                        10.5   13.85 )-----------( 365      ( 21 Feb 2019 08:08 )             32.6     02-21    136  |  102  |  53<H>  ----------------------------<  96  5.2   |  23  |  10.30<H>    Ca    9.3      21 Feb 2019 08:08    TPro  7.8  /  Alb  2.8<L>  /  TBili  0.3  /  DBili  x   /  AST  10  /  ALT  16  /  AlkPhos  87  02-21    LIVER FUNCTIONS - ( 21 Feb 2019 08:08 )  Alb: 2.8 g/dL / Pro: 7.8 g/dL / ALK PHOS: 87 U/L / ALT: 16 U/L DA / AST: 10 U/L / GGT: x             PT/INR - ( 22 Feb 2019 06:36 )   PT: 11.3 sec;   INR: 1.02 ratio         PTT - ( 21 Feb 2019 08:08 )  PTT:36.0 sec  CARDIAC MARKERS ( 21 Feb 2019 14:55 )  <0.015 ng/mL / x     / 84 U/L / x     / <1.0 ng/mL  CARDIAC MARKERS ( 21 Feb 2019 08:08 )  0.016 ng/mL / x     / x     / x     / x            Culture & Sensitivity:   CAPILLARY BLOOD GLUCOSE      POCT Blood Glucose.: 119 mg/dL (22 Feb 2019 11:57)  POCT Blood Glucose.: 96 mg/dL (22 Feb 2019 08:36)  POCT Blood Glucose.: 97 mg/dL (21 Feb 2019 16:35)        RADIOLOGY & ADDITIONAL TESTS:  Radiology testing independently reviewed:     Consultant(s) Notes Reviewed:  [ x] YES  [ ] NO    Care Discussed with Consultants/Other Providers [ x] YES  [ ] NO

## 2019-02-22 NOTE — PROGRESS NOTE ADULT - ASSESSMENT
#ESRD seen  only had 2 and half ours of HD yesterday . High venous pressures with bleeding. Has outflow stenosis of AVF and needs angioplasty. Plans underway for discharge. Angioplasty will be arranges as outpatient  # Nausea and vomiting sec to uremia and cannabinoid use  # anemia of CKD. Procrit on HD  # HTN CONT AMLODIPINE  # RENAL OSTEODYSTROPHY. CONT SENSIPAR AND SEVELAMER  # bipolar disorder cont quetiapine
39 y/o M patient, on dialysis (TTS non compliant with HD), with a significant PMHx of DM, Bipolar, schizophrenia, ESRD, morbid obesity and significant PSHx of history of hernia repair presents to the ED with Bleeding from fistula site ,nausea, vomiting and severe epigastric abdominal pain. Pain started at 12:00 am mid night. Patient reports that he has been having nausea and vomiting for 1 week. His last meal was on Tuesday. Patient also reports that he noted to have bleeding from his fistula site. Patient then called EMS. when he was waiting for EMS his checked his finger stick was 75. Patient then reports syncopal episode in ambulance. Admitted for evaluation of Syncope and missed HD.

## 2019-02-22 NOTE — PROGRESS NOTE ADULT - PROBLEM SELECTOR PLAN 2
Renal input appreciated  Renal arranged outpatient procedure. Patient verbalized understanding that he needs to leave the hospital to be ready for that procedure.   vascular surgery input appreciated

## 2019-02-22 NOTE — DISCHARGE NOTE ADULT - SOCIAL WORKER'S NAME
Kaity Canas, GISEL [facility], [facility]; [phone]

## 2019-02-22 NOTE — DISCHARGE NOTE ADULT - MEDICATION SUMMARY - MEDICATIONS TO STOP TAKING
I will STOP taking the medications listed below when I get home from the hospital:    doxycycline hyclate 100 mg oral capsule  -- 1 cap(s) by mouth 2 times a day   -- Avoid prolonged or excessive exposure to direct and/or artificial sunlight while taking this medication.  Do not take this drug if you are pregnant.  Finish all this medication unless otherwise directed by prescriber.  Medication should be taken with plenty of water.    doxycycline hyclate 100 mg oral capsule  -- 1 cap(s) by mouth 2 times a day   -- Avoid prolonged or excessive exposure to direct and/or artificial sunlight while taking this medication.  Do not take this drug if you are pregnant.  Finish all this medication unless otherwise directed by prescriber.  Medication should be taken with plenty of water.

## 2019-02-22 NOTE — PROGRESS NOTE ADULT - PROBLEM SELECTOR PLAN 1
Most likely vasovagal, patient states feeling dizzy. Also history of Nausea and vomiting.   Tele negative for events.   I doubt it's true, history obtained is kind of contradictory,   patient has history of malingering to get Dilaudid.

## 2019-02-22 NOTE — DISCHARGE NOTE ADULT - PLAN OF CARE
remain stable you were admitted for complaints of nausea, vomiting and abdominal pain.  CT scan of abdomen was negative. your symptoms resolved. follow up with PCP within 1 week HgA1C this admission.  Make sure you get your HgA1c checked every three months.  If you take oral diabetes medications, check your blood glucose two times a day.  If you take insulin, check your blood glucose before meals and at bedtime.  It's important not to skip any meals.  Keep a log of your blood glucose results and always take it with you to your doctor appointments.  Keep a list of your current medications including injectables and over the counter medications and bring this medication list with you to all your doctor appointments.  If you have not seen your ophthalmologist this year call for appointment.  Check your feet daily for redness, sores, or openings. Do not self treat. If no improvement in two days call your primary care physician for an appointment.  Low blood sugar (hypoglycemia) is a blood sugar below 70mg/dl. Check your blood sugar if you feel signs/symptoms of hypoglycemia. If your blood sugar is below 70 take 15 grams of carbohydrates (ex 4 oz of apple juice, 3-4 glucose tablets, or 4-6 oz of regular soda) wait 15 minutes and repeat blood sugar to make sure it comes up above 70.  If your blood sugar is above 70 and you are due for a meal, have a meal.  If you are not due for a meal have a snack.  This snack helps keeps your blood sugar at a safe range. Low salt diet  Activity as tolerated.  Take all medication as prescribed.  Follow up with your medical doctor for routine blood pressure monitoring at your next visit.  Notify your doctor if you have any of the following symptoms:   Dizziness, Lightheadedness, Blurry vision, Headache, Chest pain, Shortness of breath continue dialysis on TTS. do not miss dialysis days continue current medications continue dialysis on TTS. do not miss dialysis days  your fistula has some stenosis/ blockage. you need and angioplasty of fistula. Dr. La's office will call you with the appt for the procedure. please follow directions and appointment

## 2019-02-22 NOTE — DISCHARGE NOTE ADULT - PATIENT PORTAL LINK FT
You can access the [facility] Patient Portal, offered by [facility], by registering with the following website: [email or web address]

## 2019-02-25 ENCOUNTER — APPOINTMENT (OUTPATIENT)
Dept: ENDOVASCULAR SURGERY | Facility: CLINIC | Age: 39
End: 2019-02-25
Payer: MEDICARE

## 2019-02-25 ENCOUNTER — FORM ENCOUNTER (OUTPATIENT)
Age: 39
End: 2019-02-25

## 2019-02-25 PROBLEM — Z00.00 ENCOUNTER FOR PREVENTIVE HEALTH EXAMINATION: Status: ACTIVE | Noted: 2019-02-25

## 2019-02-25 PROCEDURE — 36902Z: CUSTOM

## 2019-02-26 ENCOUNTER — APPOINTMENT (OUTPATIENT)
Dept: ENDOVASCULAR SURGERY | Facility: CLINIC | Age: 39
End: 2019-02-26
Payer: MEDICARE

## 2019-02-26 VITALS
OXYGEN SATURATION: 96 % | BODY MASS INDEX: 36.45 KG/M2 | DIASTOLIC BLOOD PRESSURE: 92 MMHG | HEIGHT: 78 IN | TEMPERATURE: 98.6 F | WEIGHT: 315 LBS | SYSTOLIC BLOOD PRESSURE: 148 MMHG | HEART RATE: 92 BPM | RESPIRATION RATE: 20 BRPM

## 2019-02-26 DIAGNOSIS — F31.9 BIPOLAR DISORDER, UNSPECIFIED: ICD-10-CM

## 2019-02-26 DIAGNOSIS — E78.5 HYPERLIPIDEMIA, UNSPECIFIED: ICD-10-CM

## 2019-02-26 PROCEDURE — 36903Z: CUSTOM

## 2019-02-26 RX ORDER — INSULIN ASPART 100 [IU]/ML
INJECTION, SOLUTION INTRAVENOUS; SUBCUTANEOUS
Refills: 0 | Status: ACTIVE | COMMUNITY

## 2019-02-27 NOTE — HISTORY OF PRESENT ILLNESS
[] : left brachiocephalic fistula [FreeTextEntry1] : 2016 [FreeTextEntry4] : togeovani [FreeTextEntry5] : yesterday [FreeTextEntry6] : Dr. Jimenez

## 2019-02-27 NOTE — REASON FOR VISIT
[Other ___] : a [unfilled] visit for [High Arterial/Negative Pressure] : high arterial/negative pressure [FreeTextEntry2] : pain in fistula

## 2019-02-27 NOTE — PAST MEDICAL HISTORY
[Increasing age ( >40 years old)] : Increasing age ( >40 years old) [No therapy indicated for cases scheduled for less than one hour] : No therapy indicated for cases scheduled for less than one hour. [FreeTextEntry1] : Malignant Hyperthermia Screening Tool and Risk of Bleeding Assessment\par \par Mr. PRESTON JOHNSON JR denies family history of unexpected death following Anesthesia or Exercise.\par Denies Family history of Malignant Hyperthermia, Muscle or Neuromuscular disorder and High Temperature following exercise.\par \par Mr. PRESTON JOHNSON JR denies history of Muscle Spasm, Dark or Chocolate - Colored urine and Unanticipated fever immediately following anesthesia or serious exercise. \par Mr. ALEX MAHONEY also denies bleeding tendencies/ Risks of Bleeding.\par

## 2019-02-28 ENCOUNTER — EMERGENCY (EMERGENCY)
Facility: HOSPITAL | Age: 39
LOS: 1 days | Discharge: HOME | End: 2019-02-28
Attending: EMERGENCY MEDICINE
Payer: MEDICARE

## 2019-02-28 VITALS
HEART RATE: 102 BPM | TEMPERATURE: 99 F | OXYGEN SATURATION: 94 % | DIASTOLIC BLOOD PRESSURE: 95 MMHG | WEIGHT: 315 LBS | RESPIRATION RATE: 17 BRPM | SYSTOLIC BLOOD PRESSURE: 124 MMHG

## 2019-02-28 DIAGNOSIS — Z98.89 OTHER SPECIFIED POSTPROCEDURAL STATES: Chronic | ICD-10-CM

## 2019-02-28 LAB
ALBUMIN SERPL ELPH-MCNC: 2.7 G/DL — LOW (ref 3.5–5)
ALP SERPL-CCNC: 95 U/L — SIGNIFICANT CHANGE UP (ref 40–120)
ALT FLD-CCNC: 21 U/L DA — SIGNIFICANT CHANGE UP (ref 10–60)
ANION GAP SERPL CALC-SCNC: 11 MMOL/L — SIGNIFICANT CHANGE UP (ref 5–17)
APTT BLD: 34.6 SEC — SIGNIFICANT CHANGE UP (ref 27.5–36.3)
AST SERPL-CCNC: 13 U/L — SIGNIFICANT CHANGE UP (ref 10–40)
BASOPHILS # BLD AUTO: 0.05 K/UL — SIGNIFICANT CHANGE UP (ref 0–0.2)
BASOPHILS NFR BLD AUTO: 0.4 % — SIGNIFICANT CHANGE UP (ref 0–2)
BILIRUB SERPL-MCNC: 0.2 MG/DL — SIGNIFICANT CHANGE UP (ref 0.2–1.2)
BUN SERPL-MCNC: 53 MG/DL — HIGH (ref 7–18)
CALCIUM SERPL-MCNC: 8 MG/DL — LOW (ref 8.4–10.5)
CHLORIDE SERPL-SCNC: 96 MMOL/L — SIGNIFICANT CHANGE UP (ref 96–108)
CO2 SERPL-SCNC: 28 MMOL/L — SIGNIFICANT CHANGE UP (ref 22–31)
CREAT SERPL-MCNC: 9.24 MG/DL — HIGH (ref 0.5–1.3)
EOSINOPHIL # BLD AUTO: 0.41 K/UL — SIGNIFICANT CHANGE UP (ref 0–0.5)
EOSINOPHIL NFR BLD AUTO: 3.3 % — SIGNIFICANT CHANGE UP (ref 0–6)
GLUCOSE SERPL-MCNC: 177 MG/DL — HIGH (ref 70–99)
HCT VFR BLD CALC: 30 % — LOW (ref 39–50)
HGB BLD-MCNC: 9.8 G/DL — LOW (ref 13–17)
IMM GRANULOCYTES NFR BLD AUTO: 0.6 % — SIGNIFICANT CHANGE UP (ref 0–1.5)
INR BLD: 1.01 RATIO — SIGNIFICANT CHANGE UP (ref 0.88–1.16)
LIDOCAIN IGE QN: 245 U/L — SIGNIFICANT CHANGE UP (ref 73–393)
LYMPHOCYTES # BLD AUTO: 16.7 % — SIGNIFICANT CHANGE UP (ref 13–44)
LYMPHOCYTES # BLD AUTO: 2.08 K/UL — SIGNIFICANT CHANGE UP (ref 1–3.3)
MCHC RBC-ENTMCNC: 31.8 PG — SIGNIFICANT CHANGE UP (ref 27–34)
MCHC RBC-ENTMCNC: 32.7 GM/DL — SIGNIFICANT CHANGE UP (ref 32–36)
MCV RBC AUTO: 97.4 FL — SIGNIFICANT CHANGE UP (ref 80–100)
MONOCYTES # BLD AUTO: 0.94 K/UL — HIGH (ref 0–0.9)
MONOCYTES NFR BLD AUTO: 7.6 % — SIGNIFICANT CHANGE UP (ref 2–14)
NEUTROPHILS # BLD AUTO: 8.89 K/UL — HIGH (ref 1.8–7.4)
NEUTROPHILS NFR BLD AUTO: 71.4 % — SIGNIFICANT CHANGE UP (ref 43–77)
NRBC # BLD: 0 /100 WBCS — SIGNIFICANT CHANGE UP (ref 0–0)
PLATELET # BLD AUTO: 348 K/UL — SIGNIFICANT CHANGE UP (ref 150–400)
POTASSIUM SERPL-MCNC: 4.4 MMOL/L — SIGNIFICANT CHANGE UP (ref 3.5–5.3)
POTASSIUM SERPL-SCNC: 4.4 MMOL/L — SIGNIFICANT CHANGE UP (ref 3.5–5.3)
PROT SERPL-MCNC: 7.5 G/DL — SIGNIFICANT CHANGE UP (ref 6–8.3)
PROTHROM AB SERPL-ACNC: 11.2 SEC — SIGNIFICANT CHANGE UP (ref 10–12.9)
RBC # BLD: 3.08 M/UL — LOW (ref 4.2–5.8)
RBC # FLD: 14.8 % — HIGH (ref 10.3–14.5)
SODIUM SERPL-SCNC: 135 MMOL/L — SIGNIFICANT CHANGE UP (ref 135–145)
WBC # BLD: 12.45 K/UL — HIGH (ref 3.8–10.5)
WBC # FLD AUTO: 12.45 K/UL — HIGH (ref 3.8–10.5)

## 2019-02-28 PROCEDURE — 99284 EMERGENCY DEPT VISIT MOD MDM: CPT | Mod: 25

## 2019-03-01 VITALS
OXYGEN SATURATION: 99 % | HEART RATE: 91 BPM | DIASTOLIC BLOOD PRESSURE: 96 MMHG | RESPIRATION RATE: 18 BRPM | TEMPERATURE: 98 F | SYSTOLIC BLOOD PRESSURE: 154 MMHG

## 2019-03-01 LAB
APPEARANCE UR: CLEAR — SIGNIFICANT CHANGE UP
BACTERIA # UR AUTO: NEGATIVE /HPF — SIGNIFICANT CHANGE UP
BILIRUB UR-MCNC: NEGATIVE — SIGNIFICANT CHANGE UP
COLOR SPEC: YELLOW — SIGNIFICANT CHANGE UP
COMMENT - URINE: SIGNIFICANT CHANGE UP
DIFF PNL FLD: NEGATIVE — SIGNIFICANT CHANGE UP
EPI CELLS # UR: NEGATIVE /HPF — SIGNIFICANT CHANGE UP
FLU A RESULT: SIGNIFICANT CHANGE UP
FLU A RESULT: SIGNIFICANT CHANGE UP
FLUAV AG NPH QL: SIGNIFICANT CHANGE UP
FLUBV AG NPH QL: SIGNIFICANT CHANGE UP
GLUCOSE UR QL: 250
KETONES UR-MCNC: NEGATIVE — SIGNIFICANT CHANGE UP
LEUKOCYTE ESTERASE UR-ACNC: NEGATIVE — SIGNIFICANT CHANGE UP
NITRITE UR-MCNC: NEGATIVE — SIGNIFICANT CHANGE UP
PH UR: 8 — SIGNIFICANT CHANGE UP (ref 5–8)
PROT UR-MCNC: 500 MG/DL
RBC CASTS # UR COMP ASSIST: SIGNIFICANT CHANGE UP /HPF (ref 0–2)
RSV RESULT: SIGNIFICANT CHANGE UP
RSV RNA RESP QL NAA+PROBE: SIGNIFICANT CHANGE UP
SP GR SPEC: 1.01 — SIGNIFICANT CHANGE UP (ref 1.01–1.02)
UROBILINOGEN FLD QL: NEGATIVE — SIGNIFICANT CHANGE UP
WBC UR QL: SIGNIFICANT CHANGE UP /HPF (ref 0–5)

## 2019-03-01 PROCEDURE — 96374 THER/PROPH/DIAG INJ IV PUSH: CPT

## 2019-03-01 PROCEDURE — 71046 X-RAY EXAM CHEST 2 VIEWS: CPT | Mod: 26

## 2019-03-01 PROCEDURE — 80053 COMPREHEN METABOLIC PANEL: CPT

## 2019-03-01 PROCEDURE — 87086 URINE CULTURE/COLONY COUNT: CPT

## 2019-03-01 PROCEDURE — 83690 ASSAY OF LIPASE: CPT

## 2019-03-01 PROCEDURE — 71046 X-RAY EXAM CHEST 2 VIEWS: CPT

## 2019-03-01 PROCEDURE — 85610 PROTHROMBIN TIME: CPT

## 2019-03-01 PROCEDURE — 96375 TX/PRO/DX INJ NEW DRUG ADDON: CPT

## 2019-03-01 PROCEDURE — 85730 THROMBOPLASTIN TIME PARTIAL: CPT

## 2019-03-01 PROCEDURE — 81001 URINALYSIS AUTO W/SCOPE: CPT

## 2019-03-01 PROCEDURE — 36415 COLL VENOUS BLD VENIPUNCTURE: CPT

## 2019-03-01 PROCEDURE — 99284 EMERGENCY DEPT VISIT MOD MDM: CPT

## 2019-03-01 PROCEDURE — 85027 COMPLETE CBC AUTOMATED: CPT

## 2019-03-01 PROCEDURE — 87631 RESP VIRUS 3-5 TARGETS: CPT

## 2019-03-01 PROCEDURE — 93005 ELECTROCARDIOGRAM TRACING: CPT

## 2019-03-01 RX ORDER — ACETAMINOPHEN 500 MG
1000 TABLET ORAL ONCE
Qty: 0 | Refills: 0 | Status: COMPLETED | OUTPATIENT
Start: 2019-03-01 | End: 2019-03-01

## 2019-03-01 RX ORDER — ONDANSETRON 8 MG/1
4 TABLET, FILM COATED ORAL ONCE
Qty: 0 | Refills: 0 | Status: COMPLETED | OUTPATIENT
Start: 2019-03-01 | End: 2019-03-01

## 2019-03-01 RX ORDER — MORPHINE SULFATE 50 MG/1
4 CAPSULE, EXTENDED RELEASE ORAL ONCE
Qty: 0 | Refills: 0 | Status: DISCONTINUED | OUTPATIENT
Start: 2019-03-01 | End: 2019-03-01

## 2019-03-01 RX ADMIN — Medication 400 MILLIGRAM(S): at 03:32

## 2019-03-01 RX ADMIN — ONDANSETRON 4 MILLIGRAM(S): 8 TABLET, FILM COATED ORAL at 03:35

## 2019-03-01 RX ADMIN — MORPHINE SULFATE 4 MILLIGRAM(S): 50 CAPSULE, EXTENDED RELEASE ORAL at 03:32

## 2019-03-01 NOTE — ED PROVIDER NOTE - NS ED ROS FT
CONSTITUTIONAL: no fever, no chills   EYES: no visual changes, no eye pain   ENMT: no nasal congestion, no throat pain  CARDIOVASCULAR: no chest pain, no edema, no palpitations   RESPIRATORY: no shortness of breath, no cough   GASTROINTESTINAL: +abdominal pain, +nausea, +vomiting, no diarrhea, no constipation   GENITOURINARY: no dysuria, no frequency  MUSCULOSKELETAL: no joint pains, no myalgias, no back pain   SKIN: no rashes  NEUROLOGICAL: no weakness, no headache, no dizziness, no slurred speech, no syncope   PSYCHIATRIC: no known mental health illness   HEME/LYMPH: no lymphadenopathy      All other ROS negative except as per HPI

## 2019-03-01 NOTE — ED PROVIDER NOTE - OBJECTIVE STATEMENT
37 y/o M pt with a PMHx of ESRD (on hemodialysis Tues/Thurs/Sat), HTN, Bipolar disorder, Schizophrenia, DM, and Chronic back pain presents to ED with a vague c/o abd pain and vomiting today. Pt reports that his pain is similar to previous ED visits and admissions. Pt states that his last BM was this morning and was nml. Pt relates that his last dialysis was this morning. Pt denies fevers, bloody vomit, bloody stool, or any other acute complaints. 37 y/o M pt with a PMHx of ESRD (on hemodialysis Tues/Thurs/Sat), HTN, Bipolar disorder, Schizophrenia, DM, and chronic abd pain presents to ED with a vague c/o abd pain and vomiting today. Reports pain x many months; today's pain is similar to previous ED visits and admissions. Pt states that his last BM was this morning and was nml. Last HD this morning. Pt denies fevers, bloody vomit, bloody stool, or any other acute complaints.

## 2019-03-01 NOTE — ED PROVIDER NOTE - PROGRESS NOTE DETAILS
labs - WBCs 12, Hgb 9.8, ESRD, flu negative  CXR - mild vascular congestion (although improved from recent CXR)   EKG -   Pt observed in ED for approx 8 hours during evaluation and work up; no vomiting at all; laying flat without increased work of breathing or SOB; abd soft and nontender after pain meds. Will d/c with PCP fu. Discussed indications for patient return to ED. Patient understood. labs - WBCs 12, Hgb 9.8, ESRD, flu negative  CXR - mild vascular congestion (although improved from recent CXR)   EKG - nsr, rate 91, , QRS 74, QTc 472, inferior Q waves   Pt observed in ED for approx 8 hours during evaluation and work up; no vomiting at all; laying flat without increased work of breathing or SOB; abd soft and nontender after pain meds. Will d/c with PCP fu. Discussed indications for patient return to ED. Patient understood. labs - WBCs 12, Hgb 9.8, ESRD, flu negative  CXR - mild vascular congestion (although improved from recent CXR)   EKG - nsr, rate 91, , QRS 74, QTc 472, inferior Q waves (unchanged from previous EKG)   Pt observed in ED for approx 8 hours during evaluation and work up; no vomiting at all; laying flat without increased work of breathing or SOB; abd soft and nontender after pain meds. Will d/c with PCP [facility]. Discussed indications for patient return to ED. Patient understood.

## 2019-03-01 NOTE — ED PROVIDER NOTE - CLINICAL SUMMARY MEDICAL DECISION MAKING FREE TEXT BOX
Detail Level: Detailed Quality 110: Preventive Care And Screening: Influenza Immunization: Influenza Immunization Administered during Influenza season Quality 111:Pneumonia Vaccination Status For Older Adults: Pneumococcal Vaccination not Administered or Previously Received, Reason not Otherwise Specified Quality 130: Documentation Of Current Medications In The Medical Record: Current Medications Documented 39 y/o M pt with chronic abd pain; vital signs WNL. Pt is very well appearing and sitting in stretcher on cell phone; abd soft and nontender. Given his chronic medical conditions, will obtain labs, EKG, and CXR, however I have a low suspicion for an acute/emergent illness at this time. 37 y/o M pt with chronic abd pain; vital signs WNL. Pt is very well appearing; sitting on stretcher in no distress on cell phone; abd soft and nontender. Given his chronic medical conditions, will obtain labs, EKG, and CXR, however I have a low suspicion for an acute/emergent illness at this time.

## 2019-03-01 NOTE — ED ADULT NURSE NOTE - NSIMPLEMENTINTERV_GEN_ALL_ED
Implemented All Universal Safety Interventions:  Avenue to call system. Call bell, personal items and telephone within reach. Instruct patient to call for assistance. Room bathroom lighting operational. Non-slip footwear when patient is off stretcher. Physically safe environment: no spills, clutter or unnecessary equipment. Stretcher in lowest position, wheels locked, appropriate side rails in place.

## 2019-03-01 NOTE — ED PROVIDER NOTE - PHYSICAL EXAMINATION
GENERAL: wells appearing, no acute distress; laying flat on stretcher; using cell phone   HEAD: atraumatic   EYES: EOMI, pink conjunctiva   ENT: moist oral mucosa   CARDIAC: RRR, no edema, distal pulses present   RESPIRATORY: lungs CTAB, no increased work of breathing   GASTROINTESTINAL: no abdominal tenderness, no rebound or guarding, bowel sounds presents  GENITOURINARY: no CVA tenderness   MUSCULOSKELETAL: no deformity   NEUROLOGICAL: AAOx3, spontaneous movement of extremities   SKIN: intact   PSYCHIATRIC: cooperative  HEME LYMPH: no lymphadenopathy

## 2019-03-02 ENCOUNTER — INPATIENT (INPATIENT)
Facility: HOSPITAL | Age: 39
LOS: 2 days | Discharge: HOME | DRG: 291 | End: 2019-03-05
Attending: INTERNAL MEDICINE | Admitting: INTERNAL MEDICINE
Payer: MEDICARE

## 2019-03-02 VITALS
SYSTOLIC BLOOD PRESSURE: 165 MMHG | HEIGHT: 78 IN | RESPIRATION RATE: 18 BRPM | OXYGEN SATURATION: 100 % | DIASTOLIC BLOOD PRESSURE: 103 MMHG | HEART RATE: 92 BPM | TEMPERATURE: 98 F | WEIGHT: 315 LBS

## 2019-03-02 DIAGNOSIS — Z29.9 ENCOUNTER FOR PROPHYLACTIC MEASURES, UNSPECIFIED: ICD-10-CM

## 2019-03-02 DIAGNOSIS — Z98.89 OTHER SPECIFIED POSTPROCEDURAL STATES: Chronic | ICD-10-CM

## 2019-03-02 DIAGNOSIS — N18.6 END STAGE RENAL DISEASE: ICD-10-CM

## 2019-03-02 DIAGNOSIS — R06.02 SHORTNESS OF BREATH: ICD-10-CM

## 2019-03-02 DIAGNOSIS — E87.70 FLUID OVERLOAD, UNSPECIFIED: ICD-10-CM

## 2019-03-02 DIAGNOSIS — E11.9 TYPE 2 DIABETES MELLITUS WITHOUT COMPLICATIONS: ICD-10-CM

## 2019-03-02 DIAGNOSIS — I10 ESSENTIAL (PRIMARY) HYPERTENSION: ICD-10-CM

## 2019-03-02 DIAGNOSIS — F17.200 NICOTINE DEPENDENCE, UNSPECIFIED, UNCOMPLICATED: ICD-10-CM

## 2019-03-02 LAB
ALBUMIN SERPL ELPH-MCNC: 2.7 G/DL — LOW (ref 3.5–5)
ALP SERPL-CCNC: 93 U/L — SIGNIFICANT CHANGE UP (ref 40–120)
ALT FLD-CCNC: 21 U/L DA — SIGNIFICANT CHANGE UP (ref 10–60)
ANION GAP SERPL CALC-SCNC: 13 MMOL/L — SIGNIFICANT CHANGE UP (ref 5–17)
AST SERPL-CCNC: 18 U/L — SIGNIFICANT CHANGE UP (ref 10–40)
BASOPHILS # BLD AUTO: 0.07 K/UL — SIGNIFICANT CHANGE UP (ref 0–0.2)
BASOPHILS NFR BLD AUTO: 0.5 % — SIGNIFICANT CHANGE UP (ref 0–2)
BILIRUB SERPL-MCNC: 0.3 MG/DL — SIGNIFICANT CHANGE UP (ref 0.2–1.2)
BUN SERPL-MCNC: 73 MG/DL — HIGH (ref 7–18)
CALCIUM SERPL-MCNC: 8.9 MG/DL — SIGNIFICANT CHANGE UP (ref 8.4–10.5)
CHLORIDE SERPL-SCNC: 100 MMOL/L — SIGNIFICANT CHANGE UP (ref 96–108)
CO2 SERPL-SCNC: 22 MMOL/L — SIGNIFICANT CHANGE UP (ref 22–31)
CREAT SERPL-MCNC: 11.7 MG/DL — HIGH (ref 0.5–1.3)
CULTURE RESULTS: NO GROWTH — SIGNIFICANT CHANGE UP
EOSINOPHIL # BLD AUTO: 0.57 K/UL — HIGH (ref 0–0.5)
EOSINOPHIL NFR BLD AUTO: 4.3 % — SIGNIFICANT CHANGE UP (ref 0–6)
GLUCOSE SERPL-MCNC: 140 MG/DL — HIGH (ref 70–99)
HCT VFR BLD CALC: 30.4 % — LOW (ref 39–50)
HGB BLD-MCNC: 9.8 G/DL — LOW (ref 13–17)
IMM GRANULOCYTES NFR BLD AUTO: 0.5 % — SIGNIFICANT CHANGE UP (ref 0–1.5)
LYMPHOCYTES # BLD AUTO: 2.91 K/UL — SIGNIFICANT CHANGE UP (ref 1–3.3)
LYMPHOCYTES # BLD AUTO: 21.8 % — SIGNIFICANT CHANGE UP (ref 13–44)
MCHC RBC-ENTMCNC: 31.9 PG — SIGNIFICANT CHANGE UP (ref 27–34)
MCHC RBC-ENTMCNC: 32.2 GM/DL — SIGNIFICANT CHANGE UP (ref 32–36)
MCV RBC AUTO: 99 FL — SIGNIFICANT CHANGE UP (ref 80–100)
MONOCYTES # BLD AUTO: 0.86 K/UL — SIGNIFICANT CHANGE UP (ref 0–0.9)
MONOCYTES NFR BLD AUTO: 6.5 % — SIGNIFICANT CHANGE UP (ref 2–14)
NEUTROPHILS # BLD AUTO: 8.85 K/UL — HIGH (ref 1.8–7.4)
NEUTROPHILS NFR BLD AUTO: 66.4 % — SIGNIFICANT CHANGE UP (ref 43–77)
NRBC # BLD: 0 /100 WBCS — SIGNIFICANT CHANGE UP (ref 0–0)
NT-PROBNP SERPL-SCNC: 1843 PG/ML — HIGH (ref 0–125)
PLATELET # BLD AUTO: 350 K/UL — SIGNIFICANT CHANGE UP (ref 150–400)
POTASSIUM SERPL-MCNC: 5.2 MMOL/L — SIGNIFICANT CHANGE UP (ref 3.5–5.3)
POTASSIUM SERPL-SCNC: 5.2 MMOL/L — SIGNIFICANT CHANGE UP (ref 3.5–5.3)
PROT SERPL-MCNC: 7.7 G/DL — SIGNIFICANT CHANGE UP (ref 6–8.3)
RBC # BLD: 3.07 M/UL — LOW (ref 4.2–5.8)
RBC # FLD: 14.8 % — HIGH (ref 10.3–14.5)
SODIUM SERPL-SCNC: 135 MMOL/L — SIGNIFICANT CHANGE UP (ref 135–145)
SPECIMEN SOURCE: SIGNIFICANT CHANGE UP
TROPONIN I SERPL-MCNC: 0.01 NG/ML — SIGNIFICANT CHANGE UP (ref 0–0.04)
WBC # BLD: 13.32 K/UL — HIGH (ref 3.8–10.5)
WBC # FLD AUTO: 13.32 K/UL — HIGH (ref 3.8–10.5)

## 2019-03-02 PROCEDURE — 99285 EMERGENCY DEPT VISIT HI MDM: CPT

## 2019-03-02 PROCEDURE — 71045 X-RAY EXAM CHEST 1 VIEW: CPT | Mod: 26

## 2019-03-02 RX ORDER — HEPARIN SODIUM 5000 [USP'U]/ML
5000 INJECTION INTRAVENOUS; SUBCUTANEOUS EVERY 8 HOURS
Qty: 0 | Refills: 0 | Status: DISCONTINUED | OUTPATIENT
Start: 2019-03-02 | End: 2019-03-05

## 2019-03-02 RX ORDER — FUROSEMIDE 40 MG
40 TABLET ORAL ONCE
Qty: 0 | Refills: 0 | Status: COMPLETED | OUTPATIENT
Start: 2019-03-02 | End: 2019-03-02

## 2019-03-02 NOTE — ED PROVIDER NOTE - OBJECTIVE STATEMENT
37 yo M w hx of DM, esrd on hd c/o progressive sob that started several days ago.  States he's been getting more and more fluid taken off at dialysis.  skipped today because he felt too sick.  cannot lie flat due to sob,  sob on exertion as well

## 2019-03-02 NOTE — ED ADULT NURSE NOTE - OBJECTIVE STATEMENT
pt comes from home because "I cant Breath". Pt is able to speak in full sentences and is sitting comfortable.

## 2019-03-02 NOTE — CONSULT NOTE ADULT - SUBJECTIVE AND OBJECTIVE BOX
Patient is a 38y Male whom presented to the hospital with   DIFFICULTY BREATHING  HAS  MEDICAL  HX  SIGNIFICANT  FOR BIPOLAR  DISORDER,  SCHIZOPHRENIA,  HAS LONG  HISTORY  OF  NONCOMPLIANCE,  LAST  WEEK  CAME  ONLY  ONCE  FOR  HD  HAS  FREQ   VISITS,  WAS HERE ON  2/28/19  AS  WELL   TODAY  WAS NO  SHOW  FOR  HD,  BLAMES IT ON  HIS  TRANSPORTATION, AND  CAME  HERE THIS  EVENING AS  HE  WAS  HAVING  SOB AND  ASKED TO  EVALUATE  FOR  HD      PAST MEDICAL & SURGICAL HISTORY:    aspirin (Short breath)  penicillin (Short breath)  shellfish (Unknown)    Home Medications Reviewed  Hospital Medications:   MEDICATIONS  (STANDING):    SOCIAL HISTORY:  Denies ETOh,Smoking,   FAMILY HISTORY:    REVIEW OF SYSTEMS:  HA S BEEN  COUGHING ,   NON   PRODUCTIVE  FEEL S SOB,  GETS SOB ON  TALKING  NO  FEVER/CHILLS   APPETITE IS O K   HAS BEEN  HAVING  NAUSEA  /VOMITTING  FOR  1  MONTH !!  HAS  LITTLE  URINE OOUTPUT    VITALS:  T(F): 98.1 (03-02-19 @ 19:19), Max: 98.1 (03-02-19 @ 19:19)  HR: 92 (03-02-19 @ 19:19)  BP: 165/103 (03-02-19 @ 19:19)  RR: 18 (03-02-19 @ 19:19)  SpO2: 100% (03-02-19 @ 19:19)  Wt(kg): --    Height (cm): 200.66 (03-02 @ 19:19)  Weight (kg): 167.8 (03-02 @ 19:19)  BMI (kg/m2): 41.7 (03-02 @ 19:19)  BSA (m2): 2.96 (03-02 @ 19:19)  PHYSICAL EXAM:  Constitutional: APPEARS  SOB  HEENT:CONJ  PINK  Neck: No JVD  Respiratory: HAS SCATTERED  RONCHI,  HAS  CRACKLES  FEW- AT  BASES  POST  Cardiovascular: S1, S2, RRR  Gastrointestinal: BS+, soft, NT/ND, OBESE  Extremities:  peripheral edema TRACE  Neurological: A/O x 3,   :  No hernandez.     Vascular Access: L  U ARM  AVG    LABS:  03-02    135  |  100  |  73<H>  ----------------------------<  140<H>  5.2   |  22  |  11.70<H>    Ca    8.9      02 Mar 2019 20:16    TPro  7.7  /  Alb  2.7<L>  /  TBili  0.3  /  DBili      /  AST  18  /  ALT  21  /  AlkPhos  93  03-02    Creatinine Trend: 11.70 <--                        9.8    13.32 )-----------( 350      ( 02 Mar 2019 20:16 )             30.4     Urine Studies:      RADIOLOGY & ADDITIONAL STUDIES:

## 2019-03-02 NOTE — ED ADULT NURSE NOTE - DOES PATIENT HAVE ADVANCE DIRECTIVE
is a 89 y/o female presented w/failure to thrive and dehydration. She failed her speech and swallow evaluation on 08/07 but is doing much better now and is able to tolerate a pureed diet. Dr. Banerjee spoke with her daughter Cristina in [place] who confirms that her mother is a DNR/DNI.  has no capacity given her dementia and she was being taken care of by her her ex- but he is unable to provide further care for her. Her daughter agrees that she can make medical decisions for her (as her other children are not interested in participating in her care and are not interested in making any medical decisions).  is eating pureed diet today, and I've held off on the idea of peg tube for now. She is being treating her pain with celecoxib, gabapentin and a lidocaine patch over her knee to help her become un-contracted. She will need placement     Of note, Dr. Chriss seo filled out the [facility] form and have asked nursing to confirm daughter Cristina's decision about the DNR/DNI.  is eating better today, we've increased her robinul and encouraged her to move around. We appreciate PT's help in mobilizing her. She appears less contracted and she will need AZUCENA. No

## 2019-03-02 NOTE — H&P ADULT - PROBLEM SELECTOR PLAN 1
PIMENTEL, Orthopnea, pnd, palpitation+  Could be 2/2 fluid overload from inappropriate fluid over load vs new onset chf  BNP elevated  t1 neg, f/u t2  f/u echo   will start plavix, statin, lisinopril, lasix  s/p lasix 40mg  Cardio Dr. Parks

## 2019-03-02 NOTE — H&P ADULT - ASSESSMENT
38 year old male with pmhx type 2 dm, migraine, esrd, presented with sob, orthopnea, pnd, PIMENTEL, rales on examination, elevated bnp, pul congestion xray, will admit pt for emergent dialysis and heartfailure work up.     Assessment:   ESRD  HF likely 2/2 fluid overload  DM  Hypoalbuminemia   leukocytosis   HTN

## 2019-03-02 NOTE — ED ADULT NURSE NOTE - NSIMPLEMENTINTERV_GEN_ALL_ED
Implemented All Universal Safety Interventions:  [place] to call system. Call bell, personal items and telephone within reach. Instruct patient to call for assistance. Room bathroom lighting operational. Non-slip footwear when patient is off stretcher. Physically safe environment: no spills, clutter or unnecessary equipment. Stretcher in lowest position, wheels locked, appropriate side rails in place.

## 2019-03-02 NOTE — H&P ADULT - HISTORY OF PRESENT ILLNESS
38 year old male from home, lives with family has PMHX of type 2 DM, Migraine, ESRD (left arm graft, TTS) presented to ED after missing dialysis today with complain of SOB for a week. SOB is on and off, aggravated with exertion, associated with dry cough, orthopnea, PND, palpitation. Not associated with chest pain, fever, sore throat, headache, muscle ache, joint pain.   Allergies: selfish, aspirin, penicillin.   PSHx: Hernia repair  Fhx: sister has RA, lupus.  Social hx: smokes 1 pack/ week for last 15 years and smokes marijuana daily. 38 year old male from home, lives with family has PMHX of type 2 DM, Migraine, ESRD (left arm graft, TTS) presented to ED after missing dialysis today with complain of SOB for a week. SOB is on and off, aggravated with exertion, associated with dry cough, orthopnea, PND, palpitation. Pt has to rest after walking 3 blocks. Not associated with chest pain, fever, sore throat, headache, muscle ache, joint pain.   Allergies: selfish, aspirin, penicillin.   PSHx: Hernia repair  Fhx: sister has RA, lupus.  Social hx: smokes 1 pack/ week for last 15 years and smokes marijuana daily.

## 2019-03-02 NOTE — H&P ADULT - ATTENDING COMMENTS
Patient seen and examined in dialysis. Patient's history, vitals, labs, imaging studies reviewed. Discussed with resident, and educated on the diagnosis and management of above medical conditions. Agree with note with edits. Plan of care discussed with patient, and agrees, all questions answered.   Kassidy Patel MD  3/2/2019

## 2019-03-02 NOTE — H&P ADULT - NSHPREVIEWOFSYSTEMS_GEN_ALL_CORE
Denies fever, headache, ear problem, visual changes, chest pain abdominal pian, N/V/D, urinary problem, muscle ache, joint pain. Has Numbness on left foot, Anorexia and has loss some weight.   Denies fever, headache, ear problem, visual changes, chest pain abdominal pian, N/V/D, urinary problem, muscle ache, joint pain, recent travel, sick contact.

## 2019-03-02 NOTE — ED PROVIDER NOTE - NS ED ROS FT
Eyes:  No visual changes, eye pain or discharge.  ENMT:  No hearing changes, pain, no sore throat or runny nose, no difficulty swallowing  Cardiac:  No chest pain, SOB or edema. No chest pain with exertion.  Respiratory: sob  GI:  No nausea, vomiting, diarrhea or abdominal pain.  :  No dysuria, frequency or burning.  MS:  No myalgia, muscle weakness, joint pain or back pain.  Neuro:  No headache or weakness.  No LOC.  Skin:  No skin rash.   Endocrine: dm

## 2019-03-02 NOTE — H&P ADULT - NSHPPHYSICALEXAM_GEN_ALL_CORE
PHYSICAL EXAM:    T(C): 37.1 (03-02-19 @ 23:30), Max: 37.1 (03-02-19 @ 23:30)  HR: 89 (03-02-19 @ 23:30) (89 - 92)  BP: 167/98 (03-02-19 @ 23:30) (165/103 - 167/98)  RR: 16 (03-02-19 @ 23:30) (16 - 18)  SpO2: 100% (03-02-19 @ 23:30) (100% - 100%)

## 2019-03-02 NOTE — ED PROVIDER NOTE - PHYSICAL EXAMINATION
CONSTITUTIONAL: Well-developed; well-nourished; in no acute distress.   SKIN: warm, dry  HEAD: Normocephalic; atraumatic.  EYES: PERRL, EOMI, no conjunctival erythema  ENT: No nasal discharge; airway clear.  NECK: Supple; non tender.  CARD: S1, S2 normal; no murmurs, gallops, or rubs. Regular rate and rhythm.   RESP: crackles at bases, respiratory distress s  ABD: soft ntnd  EXT: Normal ROM.  No clubbing, cyanosis or edema.   LYMPH: No acute cervical adenopathy.  NEURO: Alert, oriented, grossly unremarkable  PSYCH: Cooperative, appropriate.

## 2019-03-03 DIAGNOSIS — E66.01 MORBID (SEVERE) OBESITY DUE TO EXCESS CALORIES: ICD-10-CM

## 2019-03-03 LAB
ANION GAP SERPL CALC-SCNC: 9 MMOL/L — SIGNIFICANT CHANGE UP (ref 5–17)
BUN SERPL-MCNC: 48 MG/DL — HIGH (ref 7–18)
CALCIUM SERPL-MCNC: 8.7 MG/DL — SIGNIFICANT CHANGE UP (ref 8.4–10.5)
CHLORIDE SERPL-SCNC: 97 MMOL/L — SIGNIFICANT CHANGE UP (ref 96–108)
CHOLEST SERPL-MCNC: 193 MG/DL — SIGNIFICANT CHANGE UP (ref 10–199)
CO2 SERPL-SCNC: 29 MMOL/L — SIGNIFICANT CHANGE UP (ref 22–31)
CREAT SERPL-MCNC: 8.84 MG/DL — HIGH (ref 0.5–1.3)
FOLATE SERPL-MCNC: 8.2 NG/ML — SIGNIFICANT CHANGE UP
GLUCOSE SERPL-MCNC: 177 MG/DL — HIGH (ref 70–99)
HBA1C BLD-MCNC: 6.3 % — HIGH (ref 4–5.6)
HCT VFR BLD CALC: 30.9 % — LOW (ref 39–50)
HDLC SERPL-MCNC: 38 MG/DL — LOW
HGB BLD-MCNC: 10.1 G/DL — LOW (ref 13–17)
LIPID PNL WITH DIRECT LDL SERPL: 92 MG/DL — SIGNIFICANT CHANGE UP
MAGNESIUM SERPL-MCNC: 2.4 MG/DL — SIGNIFICANT CHANGE UP (ref 1.6–2.6)
MCHC RBC-ENTMCNC: 31.8 PG — SIGNIFICANT CHANGE UP (ref 27–34)
MCHC RBC-ENTMCNC: 32.7 GM/DL — SIGNIFICANT CHANGE UP (ref 32–36)
MCV RBC AUTO: 97.2 FL — SIGNIFICANT CHANGE UP (ref 80–100)
NRBC # BLD: 0 /100 WBCS — SIGNIFICANT CHANGE UP (ref 0–0)
PHOSPHATE SERPL-MCNC: 7.5 MG/DL — HIGH (ref 2.5–4.5)
PLATELET # BLD AUTO: 360 K/UL — SIGNIFICANT CHANGE UP (ref 150–400)
POTASSIUM SERPL-MCNC: 4 MMOL/L — SIGNIFICANT CHANGE UP (ref 3.5–5.3)
POTASSIUM SERPL-SCNC: 4 MMOL/L — SIGNIFICANT CHANGE UP (ref 3.5–5.3)
RBC # BLD: 3.18 M/UL — LOW (ref 4.2–5.8)
RBC # FLD: 14.6 % — HIGH (ref 10.3–14.5)
SODIUM SERPL-SCNC: 135 MMOL/L — SIGNIFICANT CHANGE UP (ref 135–145)
TOTAL CHOLESTEROL/HDL RATIO MEASUREMENT: 5.1 RATIO — SIGNIFICANT CHANGE UP (ref 3.4–9.6)
TRIGL SERPL-MCNC: 313 MG/DL — HIGH (ref 10–149)
TROPONIN I SERPL-MCNC: <0.015 NG/ML — SIGNIFICANT CHANGE UP (ref 0–0.04)
TSH SERPL-MCNC: 2.13 UU/ML — SIGNIFICANT CHANGE UP (ref 0.34–4.82)
VIT B12 SERPL-MCNC: 1117 PG/ML — SIGNIFICANT CHANGE UP (ref 232–1245)
WBC # BLD: 12.89 K/UL — HIGH (ref 3.8–10.5)
WBC # FLD AUTO: 12.89 K/UL — HIGH (ref 3.8–10.5)

## 2019-03-03 RX ORDER — INSULIN LISPRO 100/ML
VIAL (ML) SUBCUTANEOUS AT BEDTIME
Qty: 0 | Refills: 0 | Status: DISCONTINUED | OUTPATIENT
Start: 2019-03-03 | End: 2019-03-05

## 2019-03-03 RX ORDER — ERYTHROPOIETIN 10000 [IU]/ML
4000 INJECTION, SOLUTION INTRAVENOUS; SUBCUTANEOUS
Qty: 0 | Refills: 0 | Status: DISCONTINUED | OUTPATIENT
Start: 2019-03-03 | End: 2019-03-03

## 2019-03-03 RX ORDER — INSULIN LISPRO 100/ML
10 VIAL (ML) SUBCUTANEOUS
Qty: 0 | Refills: 0 | Status: DISCONTINUED | OUTPATIENT
Start: 2019-03-03 | End: 2019-03-05

## 2019-03-03 RX ORDER — INSULIN GLARGINE 100 [IU]/ML
20 INJECTION, SOLUTION SUBCUTANEOUS AT BEDTIME
Qty: 0 | Refills: 0 | Status: DISCONTINUED | OUTPATIENT
Start: 2019-03-03 | End: 2019-03-05

## 2019-03-03 RX ORDER — ACETAMINOPHEN 500 MG
650 TABLET ORAL EVERY 6 HOURS
Qty: 0 | Refills: 0 | Status: DISCONTINUED | OUTPATIENT
Start: 2019-03-03 | End: 2019-03-05

## 2019-03-03 RX ORDER — HYDROXYZINE HCL 10 MG
25 TABLET ORAL DAILY
Qty: 0 | Refills: 0 | Status: DISCONTINUED | OUTPATIENT
Start: 2019-03-03 | End: 2019-03-05

## 2019-03-03 RX ORDER — ERYTHROPOIETIN 10000 [IU]/ML
4000 INJECTION, SOLUTION INTRAVENOUS; SUBCUTANEOUS
Qty: 0 | Refills: 0 | Status: DISCONTINUED | OUTPATIENT
Start: 2019-03-03 | End: 2019-03-05

## 2019-03-03 RX ORDER — INSULIN LISPRO 100/ML
VIAL (ML) SUBCUTANEOUS
Qty: 0 | Refills: 0 | Status: DISCONTINUED | OUTPATIENT
Start: 2019-03-03 | End: 2019-03-05

## 2019-03-03 RX ORDER — PANTOPRAZOLE SODIUM 20 MG/1
40 TABLET, DELAYED RELEASE ORAL
Qty: 0 | Refills: 0 | Status: DISCONTINUED | OUTPATIENT
Start: 2019-03-03 | End: 2019-03-05

## 2019-03-03 RX ORDER — MIRTAZAPINE 45 MG/1
45 TABLET, ORALLY DISINTEGRATING ORAL AT BEDTIME
Qty: 0 | Refills: 0 | Status: DISCONTINUED | OUTPATIENT
Start: 2019-03-03 | End: 2019-03-05

## 2019-03-03 RX ORDER — ACETAMINOPHEN 500 MG
1000 TABLET ORAL ONCE
Qty: 0 | Refills: 0 | Status: COMPLETED | OUTPATIENT
Start: 2019-03-03 | End: 2019-03-03

## 2019-03-03 RX ORDER — DEXTROSE 50 % IN WATER 50 %
12.5 SYRINGE (ML) INTRAVENOUS ONCE
Qty: 0 | Refills: 0 | Status: DISCONTINUED | OUTPATIENT
Start: 2019-03-03 | End: 2019-03-05

## 2019-03-03 RX ORDER — DEXTROSE 50 % IN WATER 50 %
25 SYRINGE (ML) INTRAVENOUS ONCE
Qty: 0 | Refills: 0 | Status: DISCONTINUED | OUTPATIENT
Start: 2019-03-03 | End: 2019-03-05

## 2019-03-03 RX ORDER — SEVELAMER CARBONATE 2400 MG/1
800 POWDER, FOR SUSPENSION ORAL
Qty: 0 | Refills: 0 | Status: DISCONTINUED | OUTPATIENT
Start: 2019-03-03 | End: 2019-03-05

## 2019-03-03 RX ORDER — GLUCAGON INJECTION, SOLUTION 0.5 MG/.1ML
1 INJECTION, SOLUTION SUBCUTANEOUS ONCE
Qty: 0 | Refills: 0 | Status: DISCONTINUED | OUTPATIENT
Start: 2019-03-03 | End: 2019-03-05

## 2019-03-03 RX ORDER — DEXTROSE 50 % IN WATER 50 %
15 SYRINGE (ML) INTRAVENOUS ONCE
Qty: 0 | Refills: 0 | Status: DISCONTINUED | OUTPATIENT
Start: 2019-03-03 | End: 2019-03-05

## 2019-03-03 RX ORDER — CINACALCET 30 MG/1
30 TABLET, FILM COATED ORAL DAILY
Qty: 0 | Refills: 0 | Status: DISCONTINUED | OUTPATIENT
Start: 2019-03-03 | End: 2019-03-05

## 2019-03-03 RX ORDER — SODIUM CHLORIDE 9 MG/ML
1000 INJECTION, SOLUTION INTRAVENOUS
Qty: 0 | Refills: 0 | Status: DISCONTINUED | OUTPATIENT
Start: 2019-03-03 | End: 2019-03-05

## 2019-03-03 RX ORDER — SIMVASTATIN 20 MG/1
40 TABLET, FILM COATED ORAL AT BEDTIME
Qty: 0 | Refills: 0 | Status: DISCONTINUED | OUTPATIENT
Start: 2019-03-03 | End: 2019-03-05

## 2019-03-03 RX ORDER — GABAPENTIN 400 MG/1
400 CAPSULE ORAL DAILY
Qty: 0 | Refills: 0 | Status: DISCONTINUED | OUTPATIENT
Start: 2019-03-03 | End: 2019-03-05

## 2019-03-03 RX ORDER — QUETIAPINE FUMARATE 200 MG/1
100 TABLET, FILM COATED ORAL DAILY
Qty: 0 | Refills: 0 | Status: DISCONTINUED | OUTPATIENT
Start: 2019-03-03 | End: 2019-03-05

## 2019-03-03 RX ADMIN — Medication 1 TABLET(S): at 12:03

## 2019-03-03 RX ADMIN — QUETIAPINE FUMARATE 100 MILLIGRAM(S): 200 TABLET, FILM COATED ORAL at 12:03

## 2019-03-03 RX ADMIN — INSULIN GLARGINE 20 UNIT(S): 100 INJECTION, SOLUTION SUBCUTANEOUS at 22:14

## 2019-03-03 RX ADMIN — Medication 2: at 12:03

## 2019-03-03 RX ADMIN — SIMVASTATIN 40 MILLIGRAM(S): 20 TABLET, FILM COATED ORAL at 22:13

## 2019-03-03 RX ADMIN — HEPARIN SODIUM 5000 UNIT(S): 5000 INJECTION INTRAVENOUS; SUBCUTANEOUS at 07:05

## 2019-03-03 RX ADMIN — SEVELAMER CARBONATE 800 MILLIGRAM(S): 2400 POWDER, FOR SUSPENSION ORAL at 09:30

## 2019-03-03 RX ADMIN — Medication 10 UNIT(S): at 12:04

## 2019-03-03 RX ADMIN — GABAPENTIN 400 MILLIGRAM(S): 400 CAPSULE ORAL at 09:31

## 2019-03-03 RX ADMIN — Medication 1000 MILLIGRAM(S): at 22:44

## 2019-03-03 RX ADMIN — CINACALCET 30 MILLIGRAM(S): 30 TABLET, FILM COATED ORAL at 12:03

## 2019-03-03 RX ADMIN — HEPARIN SODIUM 5000 UNIT(S): 5000 INJECTION INTRAVENOUS; SUBCUTANEOUS at 22:12

## 2019-03-03 RX ADMIN — Medication 10 UNIT(S): at 17:33

## 2019-03-03 RX ADMIN — Medication 10 UNIT(S): at 08:27

## 2019-03-03 RX ADMIN — Medication 25 MILLIGRAM(S): at 12:03

## 2019-03-03 RX ADMIN — HEPARIN SODIUM 5000 UNIT(S): 5000 INJECTION INTRAVENOUS; SUBCUTANEOUS at 17:33

## 2019-03-03 RX ADMIN — MIRTAZAPINE 45 MILLIGRAM(S): 45 TABLET, ORALLY DISINTEGRATING ORAL at 22:13

## 2019-03-03 RX ADMIN — Medication 400 MILLIGRAM(S): at 22:18

## 2019-03-03 RX ADMIN — PANTOPRAZOLE SODIUM 40 MILLIGRAM(S): 20 TABLET, DELAYED RELEASE ORAL at 08:21

## 2019-03-03 NOTE — PROGRESS NOTE ADULT - ASSESSMENT
38 year old male with pmhx type 2 dm, migraine, esrd, presented with sob, orthopnea, pnd, PIMENTEL, rales on examination, elevated bnp, pulm congestion on xray, admitted for emergent dialysis and heartfailure work up.     Assessment:   ESRD  HF likely 2/2 fluid overload  DM  Hypoalbuminemia   leukocytosis   HTN

## 2019-03-03 NOTE — PROGRESS NOTE ADULT - SUBJECTIVE AND OBJECTIVE BOX
Patient is a 38y Male with  FLUID  OVERLOAD,  MISSED  HIS  AM  DIALYSIS  YESTERDAY,  WAS  DIALYZED  LAST  NIGHT- H/O  NONCOMPLIANCE  WITH HD  SCHEDULE AND  FLUID  RESTRICTION  FEELS  BETTER  TODAY,  THOUGH  HAS  SOB  ON  MINIMAL  EXERTION    NO  CH  PAIN/PALP       aspirin (Short breath)  penicillin (Short breath)  shellfish (Unknown)    Hospital Medications:   MEDICATIONS  (STANDING):  acetaminophen  IVPB .. 1000 milliGRAM(s) IV Intermittent once  cinacalcet 30 milliGRAM(s) Oral daily  dextrose 5%. 1000 milliLiter(s) (50 mL/Hr) IV Continuous <Continuous>  dextrose 50% Injectable 12.5 Gram(s) IV Push once  dextrose 50% Injectable 25 Gram(s) IV Push once  dextrose 50% Injectable 25 Gram(s) IV Push once  gabapentin 400 milliGRAM(s) Oral daily  heparin  Injectable 5000 Unit(s) SubCutaneous every 8 hours  hydrOXYzine hydrochloride 25 milliGRAM(s) Oral daily  insulin glargine Injectable (LANTUS) 20 Unit(s) SubCutaneous at bedtime  insulin lispro (HumaLOG) corrective regimen sliding scale   SubCutaneous three times a day before meals  insulin lispro (HumaLOG) corrective regimen sliding scale   SubCutaneous at bedtime  insulin lispro Injectable (HumaLOG) 10 Unit(s) SubCutaneous three times a day before meals  mirtazapine 45 milliGRAM(s) Oral at bedtime  multivitamin 1 Tablet(s) Oral daily  pantoprazole    Tablet 40 milliGRAM(s) Oral before breakfast  QUEtiapine 100 milliGRAM(s) Oral daily  sevelamer hydrochloride 800 milliGRAM(s) Oral <User Schedule>  simvastatin 40 milliGRAM(s) Oral at bedtime    REVIEW OF SYSTEMS:  NO  FEVER/CHILLS   SOB  ON  EXERTION   COUGH   BETTER  APPETITE IS GOOD  VOIDING  WELL,  HAS   LITTLE  URINE OUTPUT  C/O  BURNING  SENSATION IN  HIS  LEG/TOES      VITALS:  T(F): 97.7 (03-03-19 @ 10:43), Max: 98.8 (03-02-19 @ 23:30)  HR: 98 (03-03-19 @ 10:43)  BP: 149/91 (03-03-19 @ 10:43)  RR: 18 (03-03-19 @ 10:43)  SpO2: 100% (03-03-19 @ 10:43)  Wt(kg): --    Height (cm): 200.66 (03-02 @ 19:19)  Weight (kg): 167.8 (03-02 @ 19:19)  BMI (kg/m2): 41.7 (03-02 @ 19:19)  BSA (m2): 2.96 (03-02 @ 19:19)  PHYSICAL EXAM:  Constitutional: NAD  HEENT: CONJ  PINK  Neck: No JVD  Respiratory: CTAB, no wheezes, rales or rhonchi  Cardiovascular: S1, S2, RRR  Gastrointestinal: BS+, soft, NT/ND, OBESE  Extremities: No peripheral edema  Neurological: A/O x 3,   Psychiatric: Normal mood, normal affect  :  No hernandez.     Vascular Access: AVG  L  UPPER  ARM    LABS:  03-03    135  |  97  |  48<H>  ----------------------------<  177<H>  4.0   |  29  |  8.84<H>    Ca    8.7      03 Mar 2019 10:50  Phos  7.5     03-03  Mg     2.4     03-03    TPro  7.7  /  Alb  2.7<L>  /  TBili  0.3  /  DBili      /  AST  18  /  ALT  21  /  AlkPhos  93  03-02    Creatinine Trend: 8.84 <--, 11.70 <--                        10.1   12.89 )-----------( 360      ( 03 Mar 2019 10:50 )             30.9     Urine Studies:      RADIOLOGY & ADDITIONAL STUDIES:

## 2019-03-03 NOTE — PROGRESS NOTE ADULT - ASSESSMENT
A/-P  PT  WITH  VERY  FREQ  VISITS  TO  ER  BECAUSE OF  MISSING  HD,  DOESNT COME REGULARLY  FOR  HIS  SCHEDULED  HD  ESRD  ON  HD  WAS  DIALYZED  EMERGENTLY  LAST  NIGHT   FOR REGULAR  HD ON   TUESDAY-  HIS  REGULAR  SCHEDULE  EPO  DURING  HD   ADV TO  LIMIT  IDWG- FLUID  GAINS   BACK ON  HIS  HOME  MEDS   IS  BEING  FOLLOWED  BY  CARDIO ,  FOR  ECHO

## 2019-03-03 NOTE — PROGRESS NOTE ADULT - PROBLEM SELECTOR PLAN 1
PIMENTEL, Orthopnea, pnd, palpitation+  Could be 2/2 fluid overload from inappropriate fluid over load vs new onset chf  BNP elevated  t1 neg, f/u t2  f/u echo    plavix, statin, lisinopril, lasix  s/p lasix 40mg  Cardio Dr. Parks

## 2019-03-03 NOTE — CONSULT NOTE ADULT - SUBJECTIVE AND OBJECTIVE BOX
HISTORY OF PRESENT ILLNESS: HPI:  38 year old male from home, lives with family has PMHX of type 2 DM, Migraine, ESRD (left arm graft, TTS) presented to ED after missing dialysis today with complain of SOB for a week. SOB is on and off, aggravated with exertion, associated with dry cough, orthopnea, PND, palpitation. Pt has to rest after walking 3 blocks. Not associated with chest pain, fever, sore throat, headache, muscle ache, joint pain.   denies Hx of CAD, No LOC  Allergies: selfish, aspirin, penicillin.   PSHx: Hernia repair  Fhx: sister has RA, lupus.  Social hx: smokes 1 pack/ week for last 15 years and smokes marijuana daily. (02 Mar 2019 22:28)      PAST MEDICAL & SURGICAL HISTORY:          MEDICATIONS:  MEDICATIONS  (STANDING):  cinacalcet 30 milliGRAM(s) Oral daily  dextrose 5%. 1000 milliLiter(s) (50 mL/Hr) IV Continuous <Continuous>  dextrose 50% Injectable 12.5 Gram(s) IV Push once  dextrose 50% Injectable 25 Gram(s) IV Push once  dextrose 50% Injectable 25 Gram(s) IV Push once  gabapentin 400 milliGRAM(s) Oral daily  heparin  Injectable 5000 Unit(s) SubCutaneous every 8 hours  hydrOXYzine hydrochloride 25 milliGRAM(s) Oral daily  insulin glargine Injectable (LANTUS) 20 Unit(s) SubCutaneous at bedtime  insulin lispro (HumaLOG) corrective regimen sliding scale   SubCutaneous three times a day before meals  insulin lispro (HumaLOG) corrective regimen sliding scale   SubCutaneous at bedtime  insulin lispro Injectable (HumaLOG) 10 Unit(s) SubCutaneous three times a day before meals  mirtazapine 45 milliGRAM(s) Oral at bedtime  multivitamin 1 Tablet(s) Oral daily  pantoprazole    Tablet 40 milliGRAM(s) Oral before breakfast  QUEtiapine 100 milliGRAM(s) Oral daily  sevelamer hydrochloride 800 milliGRAM(s) Oral <User Schedule>  simvastatin 40 milliGRAM(s) Oral at bedtime      Allergies    aspirin (Short breath)  penicillin (Short breath)  shellfish (Unknown)    Intolerances        FAMILY HISTORY:    Non-contributary for premature coronary disease or sudden cardiac death    SOCIAL HISTORY:    [ ] Non-smoker  [ X] Smoker  [ ] Alcohol      REVIEW OF SYSTEMS:  [ x]chest pain  [  x]shortness of breath  [  ]palpitations  [  ]syncope  [ ]near syncope [ ]upper extremity weakness   [ ] lower extremity weakness  [  ]diplopia  [  ]altered mental status   [  ]fevers  [ ]chills [ ]nausea  [ ]vomitting  [  ]dysphagia    [ ]abdominal pain  [ ]melena  [ ]BRBPR    [  ]epistaxis  [  ]rash    [ ]lower extremity edema        [ ] All others negative	  [ ] Unable to obtain      LABS:	 	    CARDIAC MARKERS:  CARDIAC MARKERS ( 03 Mar 2019 10:50 )  <0.015 ng/mL / x     / x     / x     / x      CARDIAC MARKERS ( 02 Mar 2019 20:16 )  0.015 ng/mL / x     / x     / x     / x                                  10.1   12.89 )-----------( 360      ( 03 Mar 2019 10:50 )             30.9     Hb Trend: 10.1<--, 9.8<--    03-03    135  |  97  |  48<H>  ----------------------------<  177<H>  4.0   |  29  |  8.84<H>    Ca    8.7      03 Mar 2019 10:50  Phos  7.5     03-03  Mg     2.4     03-03    TPro  7.7  /  Alb  2.7<L>  /  TBili  0.3  /  DBili  x   /  AST  18  /  ALT  21  /  AlkPhos  93  03-02    Creatinine Trend: 8.84<--, 11.70<--    Coags:      proBNP: Serum Pro-Brain Natriuretic Peptide: 1843 pg/mL (03-02 @ 20:16)    Lipid Profile:   HgA1c:   TSH: Thyroid Stimulating Hormone, Serum: 2.13 uU/mL (03-03 @ 10:50)          PHYSICAL EXAM:  T(C): 36.5 (03-03-19 @ 10:43), Max: 37.1 (03-02-19 @ 23:30)  HR: 98 (03-03-19 @ 10:43) (89 - 98)  BP: 149/91 (03-03-19 @ 10:43) (108/67 - 167/98)  RR: 18 (03-03-19 @ 10:43) (16 - 18)  SpO2: 100% (03-03-19 @ 10:43) (100% - 100%)  Wt(kg): --  I&O's Summary      Gen: Appears well in NAD  HEENT:  (-)icterus (-)pallor  CV: N S1 S2 1/6 ERNESTINE (+)2 Pulses B/l  Resp:  Clear to ausculatation B/L, normal effort  GI: (+) BS Soft, NT, ND  Lymph:  (-)Edema, (-)obvious lymphadenopathy  Skin: Warm to touch, Normal turgor  Psych: Appropriate mood and affect      ECG:  	NSR 89 BPM, borderline LVH    RADIOLOGY:         CXR:  No infiltrate     ASSESSMENT/PLAN: 	38y Male PMHX of type 2 DM, Migraine, ESRD (left arm graft, TTS) presented to ED after missing dialysis today with complain of SOB for a week. SOB is on and off, aggravated with exertion,    - Echo  - HD perrenal  - ischemic eval pending above    I once again thank you for allowing me to participate in the care of your patient.  If you have any questions or concerns please do not hesitate to contact me.    Clifford Parks MD, [facility]  BEEPER [phone]

## 2019-03-03 NOTE — PROGRESS NOTE ADULT - SUBJECTIVE AND OBJECTIVE BOX
Patient is a 38 year old  male who presents with a chief complaint of SOB (03 Mar 2019 12:24)    Patient seen and examined, reports he feels better today.    MEDICATIONS  (STANDING):  acetaminophen  IVPB .. 1000 milliGRAM(s) IV Intermittent once  cinacalcet 30 milliGRAM(s) Oral daily  dextrose 5%. 1000 milliLiter(s) (50 mL/Hr) IV Continuous <Continuous>  dextrose 50% Injectable 12.5 Gram(s) IV Push once  dextrose 50% Injectable 25 Gram(s) IV Push once  dextrose 50% Injectable 25 Gram(s) IV Push once  epoetin cyndie Injectable 4000 Unit(s) IV Push <User Schedule>  gabapentin 400 milliGRAM(s) Oral daily  heparin  Injectable 5000 Unit(s) SubCutaneous every 8 hours  hydrOXYzine hydrochloride 25 milliGRAM(s) Oral daily  insulin glargine Injectable (LANTUS) 20 Unit(s) SubCutaneous at bedtime  insulin lispro (HumaLOG) corrective regimen sliding scale   SubCutaneous three times a day before meals  insulin lispro (HumaLOG) corrective regimen sliding scale   SubCutaneous at bedtime  insulin lispro Injectable (HumaLOG) 10 Unit(s) SubCutaneous three times a day before meals  mirtazapine 45 milliGRAM(s) Oral at bedtime  multivitamin 1 Tablet(s) Oral daily  pantoprazole    Tablet 40 milliGRAM(s) Oral before breakfast  QUEtiapine 100 milliGRAM(s) Oral daily  sevelamer hydrochloride 800 milliGRAM(s) Oral <User Schedule>  simvastatin 40 milliGRAM(s) Oral at bedtime    MEDICATIONS  (PRN):  acetaminophen   Tablet .. 650 milliGRAM(s) Oral every 6 hours PRN Moderate Pain (4 - 6)  dextrose 40% Gel 15 Gram(s) Oral once PRN Blood Glucose LESS THAN 70 milliGRAM(s)/deciliter  glucagon  Injectable 1 milliGRAM(s) IntraMuscular once PRN Glucose LESS THAN 70 milligrams/deciliter      REVIEW OF SYSTEMS:  CONSTITUTIONAL: No fever, weight loss, has  fatigue  EYES: No eye pain, visual disturbances, or discharge  ENMT:  No difficulty hearing, tinnitus, vertigo; No sinus or throat pain  NECK: No pain or stiffness  RESPIRATORY: No cough, wheezing, chills or hemoptysis; No shortness of breath  CARDIOVASCULAR: No chest pain, palpitations, dizziness, or leg swelling  GASTROINTESTINAL: No abdominal or epigastric pain. No nausea, vomiting, or hematemesis; No diarrhea or constipation. No melena or hematochezia.  GENITOURINARY: No dysuria, frequency, hematuria, or incontinence  NEUROLOGICAL: No headaches, memory loss, loss of strength, numbness, or tremors  SKIN: No itching, burning, rashes, or lesions   ENDOCRINE: No heat or cold intolerance; No hair loss  MUSCULOSKELETAL: No joint pain or swelling; No muscle, back, or extremity pain  PSYCHIATRIC: No depression, anxiety, mood swings, or difficulty sleeping  HEME/LYMPH: No easy bruising, or bleeding gums  ALLERGY AND IMMUNOLOGIC: No hives or eczema    PHYSICAL EXAM:  T(C): 37.1 (03-03-19 @ 17:34), Max: 37.3 (03-03-19 @ 14:32)  HR: 104 (03-03-19 @ 17:34) (89 - 104)  BP: 155/92 (03-03-19 @ 17:34) (108/67 - 167/98)  RR: 16 (03-03-19 @ 17:34) (16 - 18)  SpO2: 97% (03-03-19 @ 17:34) (97% - 100%)      GENERAL: Morbidly obese male, NAD, well-groomed, well-developed  HEAD:  Atraumatic, Normocephalic  EYES: EOMI, PERRL, conjunctiva and sclera clear  ENMT: Moist mucous membranes  NECK: Supple, No JVD, Normal thyroid  NERVOUS SYSTEM:  Alert & Oriented X3, no focal deficit  CHEST/LUNG: Clear to ascultation bilaterally; No rales, rhonchi, wheezing, or rubs  HEART: Regular rate and rhythm; No murmurs, rubs, or gallops  ABDOMEN: Soft, Nontender, Nondistended; Bowel sounds present  EXTREMITIES:  2+ Peripheral Pulses, No clubbing, cyanosis, or edema  SKIN: No rashes or lesions    LABS:                        10.1   12.89 )-----------( 360      ( 03 Mar 2019 10:50 )             30.9     03-03    135  |  97  |  48<H>  ----------------------------<  177<H>  4.0   |  29  |  8.84<H>    Ca    8.7      03 Mar 2019 10:50  Phos  7.5     03-03  Mg     2.4     03-03    TPro  7.7  /  Alb  2.7<L>  /  TBili  0.3  /  DBili  x   /  AST  18  /  ALT  21  /  AlkPhos  93  03-02        CAPILLARY BLOOD GLUCOSE  POCT Blood Glucose.: 146 mg/dL (03 Mar 2019 17:00)  POCT Blood Glucose.: 216 mg/dL (03 Mar 2019 11:43)  POCT Blood Glucose.: 148 mg/dL (03 Mar 2019 08:11)            RADIOLOGY & ADDITIONAL TESTS:    Imaging Personally Reviewed:  [x] YES  [ ] NO    Consultant(s) Notes Reviewed:  [x] YES  [ ] NO    Care Discussed with Consultants/Other Providers [x] YES  [ ] NO

## 2019-03-04 ENCOUNTER — TRANSCRIPTION ENCOUNTER (OUTPATIENT)
Age: 39
End: 2019-03-04

## 2019-03-04 LAB
24R-OH-CALCIDIOL SERPL-MCNC: 20.4 NG/ML — LOW (ref 30–80)
ALBUMIN SERPL ELPH-MCNC: 2.8 G/DL — LOW (ref 3.5–5)
ALP SERPL-CCNC: 91 U/L — SIGNIFICANT CHANGE UP (ref 40–120)
ALT FLD-CCNC: 21 U/L DA — SIGNIFICANT CHANGE UP (ref 10–60)
ANION GAP SERPL CALC-SCNC: 10 MMOL/L — SIGNIFICANT CHANGE UP (ref 5–17)
ANION GAP SERPL CALC-SCNC: 9 MMOL/L — SIGNIFICANT CHANGE UP (ref 5–17)
AST SERPL-CCNC: 21 U/L — SIGNIFICANT CHANGE UP (ref 10–40)
BASOPHILS # BLD AUTO: 0.05 K/UL — SIGNIFICANT CHANGE UP (ref 0–0.2)
BASOPHILS NFR BLD AUTO: 0.4 % — SIGNIFICANT CHANGE UP (ref 0–2)
BILIRUB SERPL-MCNC: 0.3 MG/DL — SIGNIFICANT CHANGE UP (ref 0.2–1.2)
BUN SERPL-MCNC: 56 MG/DL — HIGH (ref 7–18)
BUN SERPL-MCNC: 57 MG/DL — HIGH (ref 7–18)
CALCIUM SERPL-MCNC: 8.9 MG/DL — SIGNIFICANT CHANGE UP (ref 8.4–10.5)
CALCIUM SERPL-MCNC: 9 MG/DL — SIGNIFICANT CHANGE UP (ref 8.4–10.5)
CHLORIDE SERPL-SCNC: 101 MMOL/L — SIGNIFICANT CHANGE UP (ref 96–108)
CHLORIDE SERPL-SCNC: 99 MMOL/L — SIGNIFICANT CHANGE UP (ref 96–108)
CO2 SERPL-SCNC: 25 MMOL/L — SIGNIFICANT CHANGE UP (ref 22–31)
CO2 SERPL-SCNC: 27 MMOL/L — SIGNIFICANT CHANGE UP (ref 22–31)
CREAT SERPL-MCNC: 10.2 MG/DL — HIGH (ref 0.5–1.3)
CREAT SERPL-MCNC: 10.4 MG/DL — HIGH (ref 0.5–1.3)
EOSINOPHIL # BLD AUTO: 0.5 K/UL — SIGNIFICANT CHANGE UP (ref 0–0.5)
EOSINOPHIL NFR BLD AUTO: 4.5 % — SIGNIFICANT CHANGE UP (ref 0–6)
GLUCOSE SERPL-MCNC: 106 MG/DL — HIGH (ref 70–99)
GLUCOSE SERPL-MCNC: 207 MG/DL — HIGH (ref 70–99)
HBA1C BLD-MCNC: 6.4 % — HIGH (ref 4–5.6)
HCT VFR BLD CALC: 31.7 % — LOW (ref 39–50)
HGB BLD-MCNC: 10 G/DL — LOW (ref 13–17)
IMM GRANULOCYTES NFR BLD AUTO: 0.6 % — SIGNIFICANT CHANGE UP (ref 0–1.5)
LYMPHOCYTES # BLD AUTO: 19.2 % — SIGNIFICANT CHANGE UP (ref 13–44)
LYMPHOCYTES # BLD AUTO: 2.15 K/UL — SIGNIFICANT CHANGE UP (ref 1–3.3)
MAGNESIUM SERPL-MCNC: 2.7 MG/DL — HIGH (ref 1.6–2.6)
MCHC RBC-ENTMCNC: 31.4 PG — SIGNIFICANT CHANGE UP (ref 27–34)
MCHC RBC-ENTMCNC: 31.5 GM/DL — LOW (ref 32–36)
MCV RBC AUTO: 99.7 FL — SIGNIFICANT CHANGE UP (ref 80–100)
MONOCYTES # BLD AUTO: 0.73 K/UL — SIGNIFICANT CHANGE UP (ref 0–0.9)
MONOCYTES NFR BLD AUTO: 6.5 % — SIGNIFICANT CHANGE UP (ref 2–14)
NEUTROPHILS # BLD AUTO: 7.69 K/UL — HIGH (ref 1.8–7.4)
NEUTROPHILS NFR BLD AUTO: 68.8 % — SIGNIFICANT CHANGE UP (ref 43–77)
NRBC # BLD: 0 /100 WBCS — SIGNIFICANT CHANGE UP (ref 0–0)
PHOSPHATE SERPL-MCNC: 8.9 MG/DL — SIGNIFICANT CHANGE UP (ref 2.5–4.5)
PLATELET # BLD AUTO: 354 K/UL — SIGNIFICANT CHANGE UP (ref 150–400)
POTASSIUM SERPL-MCNC: 4.5 MMOL/L — SIGNIFICANT CHANGE UP (ref 3.5–5.3)
POTASSIUM SERPL-MCNC: 5.8 MMOL/L — HIGH (ref 3.5–5.3)
POTASSIUM SERPL-SCNC: 4.5 MMOL/L — SIGNIFICANT CHANGE UP (ref 3.5–5.3)
POTASSIUM SERPL-SCNC: 5.8 MMOL/L — HIGH (ref 3.5–5.3)
PROT SERPL-MCNC: 7.8 G/DL — SIGNIFICANT CHANGE UP (ref 6–8.3)
RBC # BLD: 3.18 M/UL — LOW (ref 4.2–5.8)
RBC # FLD: 14.5 % — SIGNIFICANT CHANGE UP (ref 10.3–14.5)
SODIUM SERPL-SCNC: 135 MMOL/L — SIGNIFICANT CHANGE UP (ref 135–145)
SODIUM SERPL-SCNC: 136 MMOL/L — SIGNIFICANT CHANGE UP (ref 135–145)
WBC # BLD: 11.19 K/UL — HIGH (ref 3.8–10.5)
WBC # FLD AUTO: 11.19 K/UL — HIGH (ref 3.8–10.5)

## 2019-03-04 PROCEDURE — 99222 1ST HOSP IP/OBS MODERATE 55: CPT

## 2019-03-04 RX ORDER — ACETAMINOPHEN 500 MG
1000 TABLET ORAL ONCE
Qty: 0 | Refills: 0 | Status: COMPLETED | OUTPATIENT
Start: 2019-03-04 | End: 2019-03-04

## 2019-03-04 RX ADMIN — QUETIAPINE FUMARATE 100 MILLIGRAM(S): 200 TABLET, FILM COATED ORAL at 13:19

## 2019-03-04 RX ADMIN — SEVELAMER CARBONATE 800 MILLIGRAM(S): 2400 POWDER, FOR SUSPENSION ORAL at 08:53

## 2019-03-04 RX ADMIN — PANTOPRAZOLE SODIUM 40 MILLIGRAM(S): 20 TABLET, DELAYED RELEASE ORAL at 06:28

## 2019-03-04 RX ADMIN — Medication 1000 MILLIGRAM(S): at 11:15

## 2019-03-04 RX ADMIN — INSULIN GLARGINE 20 UNIT(S): 100 INJECTION, SOLUTION SUBCUTANEOUS at 21:34

## 2019-03-04 RX ADMIN — HEPARIN SODIUM 5000 UNIT(S): 5000 INJECTION INTRAVENOUS; SUBCUTANEOUS at 13:19

## 2019-03-04 RX ADMIN — CINACALCET 30 MILLIGRAM(S): 30 TABLET, FILM COATED ORAL at 13:19

## 2019-03-04 RX ADMIN — Medication 25 MILLIGRAM(S): at 13:19

## 2019-03-04 RX ADMIN — Medication 1 TABLET(S): at 13:19

## 2019-03-04 RX ADMIN — Medication 400 MILLIGRAM(S): at 10:53

## 2019-03-04 RX ADMIN — Medication 2: at 18:05

## 2019-03-04 RX ADMIN — Medication 2: at 10:53

## 2019-03-04 RX ADMIN — Medication 10 UNIT(S): at 18:05

## 2019-03-04 RX ADMIN — MIRTAZAPINE 45 MILLIGRAM(S): 45 TABLET, ORALLY DISINTEGRATING ORAL at 21:35

## 2019-03-04 RX ADMIN — Medication 10 UNIT(S): at 10:53

## 2019-03-04 RX ADMIN — Medication 10 UNIT(S): at 08:53

## 2019-03-04 RX ADMIN — GABAPENTIN 400 MILLIGRAM(S): 400 CAPSULE ORAL at 13:19

## 2019-03-04 RX ADMIN — HEPARIN SODIUM 5000 UNIT(S): 5000 INJECTION INTRAVENOUS; SUBCUTANEOUS at 21:35

## 2019-03-04 RX ADMIN — SIMVASTATIN 40 MILLIGRAM(S): 20 TABLET, FILM COATED ORAL at 21:35

## 2019-03-04 RX ADMIN — HEPARIN SODIUM 5000 UNIT(S): 5000 INJECTION INTRAVENOUS; SUBCUTANEOUS at 06:31

## 2019-03-04 NOTE — PROGRESS NOTE ADULT - SUBJECTIVE AND OBJECTIVE BOX
[facility] : Progress Note :: [phone], (office [phone]),   Dr La / Dr Hui / Dr Barber / Dr Villalobos / Dr Hang CASTELLANO / Dr Mehta / Dr Sanchez / Dr Alber dumont  _____________________________________________________________________________________________    s/p emergent HD Saturday night. no SOB.    aspirin (Short breath)  penicillin (Short breath)  shellfish (Unknown)    Hospital Medications:   MEDICATIONS  (STANDING):  cinacalcet 30 milliGRAM(s) Oral daily  dextrose 5%. 1000 milliLiter(s) (50 mL/Hr) IV Continuous <Continuous>  dextrose 50% Injectable 12.5 Gram(s) IV Push once  dextrose 50% Injectable 25 Gram(s) IV Push once  dextrose 50% Injectable 25 Gram(s) IV Push once  epoetin cyndie Injectable 4000 Unit(s) IV Push <User Schedule>  gabapentin 400 milliGRAM(s) Oral daily  heparin  Injectable 5000 Unit(s) SubCutaneous every 8 hours  hydrOXYzine hydrochloride 25 milliGRAM(s) Oral daily  insulin glargine Injectable (LANTUS) 20 Unit(s) SubCutaneous at bedtime  insulin lispro (HumaLOG) corrective regimen sliding scale   SubCutaneous three times a day before meals  insulin lispro (HumaLOG) corrective regimen sliding scale   SubCutaneous at bedtime  insulin lispro Injectable (HumaLOG) 10 Unit(s) SubCutaneous three times a day before meals  mirtazapine 45 milliGRAM(s) Oral at bedtime  multivitamin 1 Tablet(s) Oral daily  pantoprazole    Tablet 40 milliGRAM(s) Oral before breakfast  QUEtiapine 100 milliGRAM(s) Oral daily  sevelamer hydrochloride 800 milliGRAM(s) Oral <User Schedule>  simvastatin 40 milliGRAM(s) Oral at bedtime        VITALS:  T(F): 98.2 (03-04-19 @ 18:16), Max: 98.3 (03-04-19 @ 15:05)  HR: 100 (03-04-19 @ 18:16)  BP: 154/88 (03-04-19 @ 18:16)  RR: 18 (03-04-19 @ 18:16)  SpO2: 99% (03-04-19 @ 18:16)  Wt(kg): --      PHYSICAL EXAM:  Constitutional: NAD  HEENT: anicteric sclera, oropharynx clear.  Neck: No JVD  Respiratory: CTAB, no wheezes, rales or rhonchi  Cardiovascular: S1, S2, RRR  Gastrointestinal: BS+, soft, NT/ND  Extremities:  peripheral edema+  Neurological: A/O x 3, no focal deficits  Vascular Access: LUEAVF with aneurysm.    LABS:  03-04    135  |  99  |  57<H>  ----------------------------<  207<H>  4.5   |  27  |  10.40<H>    Ca    8.9      04 Mar 2019 10:53  Phos  8.9     03-04  Mg     2.7     03-04    TPro  7.8  /  Alb  2.8<L>  /  TBili  0.3  /  DBili      /  AST  21  /  ALT  21  /  AlkPhos  91  03-04    Creatinine Trend: 10.40 <--, 10.20 <--, 8.84 <--, 11.70 <--                        10.0   11.19 )-----------( 354      ( 04 Mar 2019 06:54 )             31.7     Urine Studies:      RADIOLOGY & ADDITIONAL STUDIES:

## 2019-03-04 NOTE — PROGRESS NOTE ADULT - ASSESSMENT
38 year old male with pmhx type 2 dm, migraine, esrd, presented with sob, orthopnea, pnd, PIMENTEL, rales on examination, elevated bnp, pul congestion xray, will admit pt for emergent dialysis and heartfailure work up.     Assessment:   ESRD  HF likely 2/2 fluid overload  DM  Hypoalbuminemia   leukocytosis   HTN 38 year old male with pmhx type 2 dm, migraine, esrd, presented with sob, orthopnea, pnd, PIMENTEL, rales on examination, elevated bnp, pulm congestion xray, will admit pt for emergent dialysis and heartfailure work up.     Assessment:   ESRD  HF likely 2/2 fluid overload  DM  Hypoalbuminemia   leukocytosis   HTN

## 2019-03-04 NOTE — CONSULT NOTE ADULT - ASSESSMENT
A/P  ESRD  ON HD   MISSED  HIS  HD  TODAY  AND  COMES IN  WITH  SOB  HAS  FLUID  EXCESS    WILL ARRANGE  FOR URGENT  HD  NOW   NURSING  SUPERVISOR CALLED AND  INFORMED  ABOUT THE  NEED  TO  CALL  HD  NURSE   WILL  DIALYZE  WITH 2K  BATH   UF GOAL 3000  HAS HIGH  BP,  EXPECT  TO  IMPROVE  WITH  UF       PT EDUCATED ON  THE  RISKS OF  MISSING  HD  WILL  CONT  HIS  HOME  MEDS
1) Fluid overload secondary to ESRD and missing dialysis  2) Chronic dyspnea on exertion  3) Morbid obesity BMI > 40  4) ESRD  5) DM    Clinically stable  Dyspnea likely multifactorial with obesity and fluid overload.   Echo pending  Low suspicion for KRYSTIN given his recent negative sleep study at [facility] which he states he was over 400lbs at that time  If still dyspneic after dialysis, would consider a functional ie treadmill stress echo to monitor response to exercise  Outpatient PFTs  Hx not suggestive of airway disease ie asthma  Will follow

## 2019-03-04 NOTE — DISCHARGE NOTE PROVIDER - CARE PROVIDERS DIRECT ADDRESSES
[email or web address]

## 2019-03-04 NOTE — PROGRESS NOTE ADULT - PROBLEM SELECTOR PLAN 1
PIMENTEL, Orthopnea, pnd, palpitation+  Could be 2/2 fluid overload from inappropriate fluid over load vs new onset chf  BNP elevated  t1 neg, f/u t2  f/u echo   will start plavix, statin, lisinopril, lasix  s/p lasix 40mg  Cardio Dr. Parks 2/2 fluid overload   BNP elevated  f/u echo   c/w plavix, statin, lisinopril, lasix  Cardio Dr. Parks  pulmonology consulted Dr. Thurston  f/u PT   ** NO DILAUDID TO BE GIVEN **

## 2019-03-04 NOTE — CONSULT NOTE ADULT - SUBJECTIVE AND OBJECTIVE BOX
Source: Patient and chart    Reason for Consultation: Evaluate for possible underyling KRYSTIN    Chief Complaint: SOB after missing dialysis x 1 week    HPI:          MEDICATIONS  (STANDING):  cinacalcet 30 milliGRAM(s) Oral daily  dextrose 5%. 1000 milliLiter(s) (50 mL/Hr) IV Continuous <Continuous>  dextrose 50% Injectable 12.5 Gram(s) IV Push once  dextrose 50% Injectable 25 Gram(s) IV Push once  dextrose 50% Injectable 25 Gram(s) IV Push once  epoetin cyndie Injectable 4000 Unit(s) IV Push <User Schedule>  gabapentin 400 milliGRAM(s) Oral daily  heparin  Injectable 5000 Unit(s) SubCutaneous every 8 hours  hydrOXYzine hydrochloride 25 milliGRAM(s) Oral daily  insulin glargine Injectable (LANTUS) 20 Unit(s) SubCutaneous at bedtime  insulin lispro (HumaLOG) corrective regimen sliding scale   SubCutaneous three times a day before meals  insulin lispro (HumaLOG) corrective regimen sliding scale   SubCutaneous at bedtime  insulin lispro Injectable (HumaLOG) 10 Unit(s) SubCutaneous three times a day before meals  mirtazapine 45 milliGRAM(s) Oral at bedtime  multivitamin 1 Tablet(s) Oral daily  pantoprazole    Tablet 40 milliGRAM(s) Oral before breakfast  QUEtiapine 100 milliGRAM(s) Oral daily  sevelamer hydrochloride 800 milliGRAM(s) Oral <User Schedule>  simvastatin 40 milliGRAM(s) Oral at bedtime    MEDICATIONS  (PRN):  acetaminophen   Tablet .. 650 milliGRAM(s) Oral every 6 hours PRN Moderate Pain (4 - 6)  dextrose 40% Gel 15 Gram(s) Oral once PRN Blood Glucose LESS THAN 70 milliGRAM(s)/deciliter  glucagon  Injectable 1 milliGRAM(s) IntraMuscular once PRN Glucose LESS THAN 70 milligrams/deciliter    Allergies    aspirin (Short breath)  penicillin (Short breath)  shellfish (Unknown)    Intolerances    PAST MEDICAL & SURGICAL HISTORY:  DM  ESRD    FAMILY HISTORY:    SOCIAL HISTORY  Smoking History: Ex-smoker   Alcohol: No ETOH  Drugs: + Marijuana    T(C): 36.8 (03-04-19 @ 15:05), Max: 37.1 (03-03-19 @ 17:34)  HR: 91 (03-04-19 @ 15:05) (88 - 104)  BP: 141/87 (03-04-19 @ 15:05) (123/63 - 163/102)  RR: 18 (03-04-19 @ 15:05) (16 - 19)  SpO2: 100% (03-04-19 @ 15:05) (96% - 100%)    LABS:                        10.0   11.19 )-----------( 354      ( 04 Mar 2019 06:54 )             31.7     03-04    135  |  99  |  57<H>  ----------------------------<  207<H>  4.5   |  27  |  10.40<H>    Ca    8.9      04 Mar 2019 10:53  Phos  8.9     03-04  Mg     2.7     03-04    TPro  7.8  /  Alb  2.8<L>  /  TBili  0.3  /  DBili  x   /  AST  21  /  ALT  21  /  AlkPhos  91  03-04    CARDIAC MARKERS ( 03 Mar 2019 10:50 )  <0.015 ng/mL / x     / x     / x     / x      CARDIAC MARKERS ( 02 Mar 2019 20:16 )  0.015 ng/mL / x     / x     / x     / x        Serum Pro-Brain Natriuretic Peptide: 1843 pg/mL (03-02-19 @ 20:16)    Microbiology    RADIOLOGY & ADDITIONAL STUDIES: (My Reading)    CXR- limited portable film. No infiltrates Source: Patient and chart    Reason for Consultation: Evaluate for possible underyling KRYSTIN    Chief Complaint: SOB after missing dialysis x 1 week    HPI:  38 year old male from home, lives with family has PMHX of type 2 DM, Migraine, ESRD (left arm graft, TTS) presented to ED after missing dialysis today with complain of SOB for a week. SOB is on and off, aggravated with exertion, associated with dry cough, orthopnea, PND, palpitation. Pt has to rest after walking 3 blocks. Not associated with chest pain, fever, sore throat, headache, muscle ache, joint pain.   The patient states that he had a negative sleep study 4 years ago at [facility] and has low weight since then. He denies any snoring or daytime somnolence. +Chronic orthopnea and dyspnea on exertion. No family history of asthma. He denies any wheezing or nocturnal coughing. He has had no recent stress test    MEDICATIONS  (STANDING):  cinacalcet 30 milliGRAM(s) Oral daily  dextrose 5%. 1000 milliLiter(s) (50 mL/Hr) IV Continuous <Continuous>  dextrose 50% Injectable 12.5 Gram(s) IV Push once  dextrose 50% Injectable 25 Gram(s) IV Push once  dextrose 50% Injectable 25 Gram(s) IV Push once  epoetin cyndie Injectable 4000 Unit(s) IV Push <User Schedule>  gabapentin 400 milliGRAM(s) Oral daily  heparin  Injectable 5000 Unit(s) SubCutaneous every 8 hours  hydrOXYzine hydrochloride 25 milliGRAM(s) Oral daily  insulin glargine Injectable (LANTUS) 20 Unit(s) SubCutaneous at bedtime  insulin lispro (HumaLOG) corrective regimen sliding scale   SubCutaneous three times a day before meals  insulin lispro (HumaLOG) corrective regimen sliding scale   SubCutaneous at bedtime  insulin lispro Injectable (HumaLOG) 10 Unit(s) SubCutaneous three times a day before meals  mirtazapine 45 milliGRAM(s) Oral at bedtime  multivitamin 1 Tablet(s) Oral daily  pantoprazole    Tablet 40 milliGRAM(s) Oral before breakfast  QUEtiapine 100 milliGRAM(s) Oral daily  sevelamer hydrochloride 800 milliGRAM(s) Oral <User Schedule>  simvastatin 40 milliGRAM(s) Oral at bedtime    MEDICATIONS  (PRN):  acetaminophen   Tablet .. 650 milliGRAM(s) Oral every 6 hours PRN Moderate Pain (4 - 6)  dextrose 40% Gel 15 Gram(s) Oral once PRN Blood Glucose LESS THAN 70 milliGRAM(s)/deciliter  glucagon  Injectable 1 milliGRAM(s) IntraMuscular once PRN Glucose LESS THAN 70 milligrams/deciliter    Allergies    aspirin (Short breath)  penicillin (Short breath)  shellfish (Unknown)    Intolerances    PAST MEDICAL & SURGICAL HISTORY:  DM  ESRD    FAMILY HISTORY:    SOCIAL HISTORY  Smoking History: +Smoker  Alcohol: Social drinker  Drugs: + Marijuana    T(C): 36.8 (03-04-19 @ 15:05), Max: 37.1 (03-03-19 @ 17:34)  HR: 91 (03-04-19 @ 15:05) (88 - 104)  BP: 141/87 (03-04-19 @ 15:05) (123/63 - 163/102)  RR: 18 (03-04-19 @ 15:05) (16 - 19)  SpO2: 100% (03-04-19 @ 15:05) (96% - 100%)    LABS:                        10.0   11.19 )-----------( 354      ( 04 Mar 2019 06:54 )             31.7     03-04    135  |  99  |  57<H>  ----------------------------<  207<H>  4.5   |  27  |  10.40<H>    Ca    8.9      04 Mar 2019 10:53  Phos  8.9     03-04  Mg     2.7     03-04    TPro  7.8  /  Alb  2.8<L>  /  TBili  0.3  /  DBili  x   /  AST  21  /  ALT  21  /  AlkPhos  91  03-04    CARDIAC MARKERS ( 03 Mar 2019 10:50 )  <0.015 ng/mL / x     / x     / x     / x      CARDIAC MARKERS ( 02 Mar 2019 20:16 )  0.015 ng/mL / x     / x     / x     / x        Serum Pro-Brain Natriuretic Peptide: 1843 pg/mL (03-02-19 @ 20:16)    Microbiology    RADIOLOGY & ADDITIONAL STUDIES: (My Reading)    CXR- limited portable film. No infiltrates

## 2019-03-04 NOTE — DISCHARGE NOTE PROVIDER - HOSPITAL COURSE
38 year old male from home, lives with family has PMHX of type 2 DM, Migraine, ESRD (left arm graft, TTS) presented to ED after missing dialysis today with complain of SOB for a week. SOB is on and off, aggravated with exertion, associated with dry cough, orthopnea, PND, palpitation. Pt has to rest after walking 3 blocks. Not associated with chest pain, fever, sore throat, headache, muscle ache, joint pain. came with difficulty in breathing and wheezing .chest xray did not show pneumonia, possibly from fluid overload     pt got dialysis during your admission  Cardiologist consulted for your Shortness of breath Dr. Parks and Echo wasperformed which came normal and recommended getting stress test as outpatient as Camera of stress mounika will not be able to perform the test here with morbid obesity . Given patient's improved clinical status and current hemodynamic stability, decision was made to discharge the patient. . Patient is stable for discharge per attending and is advised to follow up with PCP as outpatient . Please refer to patient's complete medical chart with documents for a full hospital course, for this is only a brief summary. 38 year old male from home, lives with family has PMHX of type 2 DM, Migraine, ESRD (left arm graft, TTS) presented to ED after missing dialysis today with complain of SOB for a week. SOB is on and off, aggravated with exertion, associated with dry cough, orthopnea, PND, palpitation. Pt has to rest after walking 3 blocks. Not associated with chest pain, fever, sore throat, headache, muscle ache, joint pain. came with difficulty in breathing and wheezing .chest xray did not show pneumonia, possibly from fluid overload     Pt got dialysis during your admission  Cardiologist consulted for your Shortness of breath Dr. Parks and Echo was performed which came normal and recommended getting stress test as outpatient as Camera of stress test will not be able to perform the test here with morbid obesity . Given patient's improved clinical status and current hemodynamic stability, decision was made to discharge the patient. Patient is stable for discharge per attending and is advised to follow up with PCP as outpatient . Please refer to patient's complete medical chart with documents for a full hospital course, for this is only a brief summary.

## 2019-03-04 NOTE — PROGRESS NOTE ADULT - ATTENDING COMMENTS
Patient seen and examined. Patient's history, vitals, labs, imaging studies reviewed. Discussed with above resident, and educated on the diagnosis and management of above medical conditions. Agree with note with edits. Morbid obesity - BMI 41.7, educated on life style modification. r/o KRYSTIN - f/u Pulm - Dr. Thurston. Plan of care discussed with patient, and agrees, all questions answered.   Kassidy Patel MD Patient seen and examined. Patient's history, vitals, labs, imaging studies reviewed. Discussed with above resident, and educated on the diagnosis and management of above medical conditions. Agree with note with edits. Morbid obesity - BMI 41.7, educated on life style modification. r/o KRYSTIN - f/u Pulm - Dr. Thurston. Plan of care discussed with patient, and agrees, all questions answered.   Kassidy Patel MD  3/4/2019

## 2019-03-04 NOTE — PROGRESS NOTE ADULT - PROBLEM SELECTOR PLAN 4
Will start lisinopril 5mg and amlodipine 2.5 mg with parameters c/w lisinopril 5mg and amlodipine 2.5 mg with parameters

## 2019-03-04 NOTE — PROGRESS NOTE ADULT - ASSESSMENT
#ESRD admitted with SOB. S/P emergent hemodialysis . HD in AM  # Anemia of CKD. procrit in AM  #renal osteodystrophy. cont sensipar and renvela.

## 2019-03-04 NOTE — PROGRESS NOTE ADULT - PROBLEM SELECTOR PLAN 2
Schedule TTS  Through left arm graft   C/w renal diet  Nephro Dr. La  Emergent Dialysis Schedule TTS  Through left arm graft   C/w renal diet  Nephro Dr. La  Emergent Dialysis done Schedule TTS  Through left arm graft   C/w renal diet  Nephro Dr. La

## 2019-03-04 NOTE — DISCHARGE NOTE PROVIDER - CARE PROVIDER_API CALL
Eliceo Thurston)  Critical Care Medicine; Internal Medicine; Pulmonary Disease  [address] [place] [address]  Phone: [phone]  Fax: [phone]  Follow Up Time:     Clifford Parks)  Cardiovascular Disease  [address] [facility], [address] [address]  Phone: [phone]  Fax: [phone]  Follow Up Time:

## 2019-03-04 NOTE — PROGRESS NOTE ADULT - SUBJECTIVE AND OBJECTIVE BOX
PGY1 Note discussed with Supervising Resident and Primary Attending.    Patient is a 38y old  Male who presents with a chief complaint of SOB (03 Mar 2019 20:01)      INTERVAL HPI/OVERNIGHT EVENTS :    ***********************************************************************************************************    MEDICATIONS  (STANDING):  cinacalcet 30 milliGRAM(s) Oral daily  dextrose 5%. 1000 milliLiter(s) (50 mL/Hr) IV Continuous <Continuous>  dextrose 50% Injectable 12.5 Gram(s) IV Push once  dextrose 50% Injectable 25 Gram(s) IV Push once  dextrose 50% Injectable 25 Gram(s) IV Push once  epoetin cyndie Injectable 4000 Unit(s) IV Push <User Schedule>  gabapentin 400 milliGRAM(s) Oral daily  heparin  Injectable 5000 Unit(s) SubCutaneous every 8 hours  hydrOXYzine hydrochloride 25 milliGRAM(s) Oral daily  insulin glargine Injectable (LANTUS) 20 Unit(s) SubCutaneous at bedtime  insulin lispro (HumaLOG) corrective regimen sliding scale   SubCutaneous three times a day before meals  insulin lispro (HumaLOG) corrective regimen sliding scale   SubCutaneous at bedtime  insulin lispro Injectable (HumaLOG) 10 Unit(s) SubCutaneous three times a day before meals  mirtazapine 45 milliGRAM(s) Oral at bedtime  multivitamin 1 Tablet(s) Oral daily  pantoprazole    Tablet 40 milliGRAM(s) Oral before breakfast  QUEtiapine 100 milliGRAM(s) Oral daily  sevelamer hydrochloride 800 milliGRAM(s) Oral <User Schedule>  simvastatin 40 milliGRAM(s) Oral at bedtime    MEDICATIONS  (PRN):  acetaminophen   Tablet .. 650 milliGRAM(s) Oral every 6 hours PRN Moderate Pain (4 - 6)  dextrose 40% Gel 15 Gram(s) Oral once PRN Blood Glucose LESS THAN 70 milliGRAM(s)/deciliter  glucagon  Injectable 1 milliGRAM(s) IntraMuscular once PRN Glucose LESS THAN 70 milligrams/deciliter      ***********************************************************************************************************    Allergies    aspirin (Short breath)  penicillin (Short breath)  shellfish (Unknown)    Intolerances        ***********************************************************************************************************    REVIEW OF SYSTEMS :  * CONSTITUTIONAL      : No Fever, Weight loss, or Fatigue  * EYES                             : No eye pain , Visual disturbances or Discharge  * RESPIRATORY             : No Cough, Wheezing, Chills or Hemoptysis; No shortness of breath  * CARDIOVASCULAR     : No Chest pain, Palpitations, Dizziness, or Leg swelling  * GASTROINTESTINAL  : No Abdominal or Epigastric pain. No Nausea, Vomiting or Hematemesis; No Diarrhea or Constipation. No Melena or Hematochezia.  * GENITOURINARY        : No Dysuria , Frequency , Haematuria   * NEUROLOGICAL          : No Headaches, Memory loss, Loss of trength, Numbness, or Tremors  * MUSCULOSKELETAL   : No Joint pain  * PSYCHIATRY                 : No Depression or Anxiety   * HEME/LYMPH              : No Easy Bruising or Bleeding gums  * SKIN                               : No Itching, Burning, Rashes, or Lesions     ***********************************************************************************************************    Vital Signs Last 24 Hrs  T(C): 36.8 (04 Mar 2019 11:53), Max: 37.1 (03 Mar 2019 17:34)  T(F): 98.2 (04 Mar 2019 11:53), Max: 98.7 (03 Mar 2019 17:34)  HR: 88 (04 Mar 2019 11:53) (88 - 104)  BP: 149/83 (04 Mar 2019 11:53) (123/63 - 163/102)  BP(mean): --  RR: 18 (04 Mar 2019 11:53) (16 - 19)  SpO2: 100% (04 Mar 2019 11:53) (96% - 100%)    ***********************************************************************************************************    PHYSICAL EXAM :  * GENERAL                 : NAD, Well-groomed, Well-developed  * HEAD                       :  Atraumatic, Normocephalic  * EYES                         : EOMI, PERRLA, Conjunctiva and Sclera clear  * ENT                           : Moist Mucous Membranes  * NECK                         : Supple, No JVD, Normal Thyroid  * CHEST/LUNG           : Clear to Auscultation bilaterally; No Rales, Rhonchi, Wheezing or Rubs  * HEART                       : Regular Rate and Rhythm; No murmurs, Rubs or gallops  * ABDOMEN                : Soft, Non-tender, Non-distended; Bowel Sounds present  * NERVOUS SYSTEM  :  Alert & Oriented X3, Good Concentration; Motor Strength 5/5 B/L UL LL ; DTRs 2+ Intact and Symmetric  * EXTREMITIES            :  2+ Peripheral Pulses, No clubbing, cyanosis, or edema  * SKIN                           : No Rashes or Lesions    **********************************************************************************************************  LABS:                          10.0   11.19 )-----------( 354      ( 04 Mar 2019 06:54 )             31.7     03-04    135  |  99  |  57<H>  ----------------------------<  207<H>  4.5   |  27  |  10.40<H>    Ca    8.9      04 Mar 2019 10:53  Phos  8.9     03-04  Mg     2.7     03-04    TPro  7.8  /  Alb  2.8<L>  /  TBili  0.3  /  DBili  x   /  AST  21  /  ALT  21  /  AlkPhos  91  03-04        CAPILLARY BLOOD GLUCOSE      POCT Blood Glucose.: 213 mg/dL (04 Mar 2019 10:49)  POCT Blood Glucose.: 122 mg/dL (04 Mar 2019 08:11)  POCT Blood Glucose.: 195 mg/dL (03 Mar 2019 21:44)  POCT Blood Glucose.: 146 mg/dL (03 Mar 2019 17:00)      **********************************************************************************************************    RADIOLOGY & ADDITIONAL TESTS:   No radiological imaging was required    Imaging Personally Reviewed   :  [ ] YES  [ ] NO    Consultant(s) Notes Reviewed :  [ ] YES  [ ] NO PGY1 Note discussed with Supervising Resident and Primary Attending.    Patient is a 38 year old  Male who presents with a chief complaint of SOB (03 Mar 2019 20:01)      INTERVAL HPI/OVERNIGHT EVENTS : patient reports he feels better    **********************************************************************************************    MEDICATIONS  (STANDING):  cinacalcet 30 milliGRAM(s) Oral daily  dextrose 5%. 1000 milliLiter(s) (50 mL/Hr) IV Continuous <Continuous>  dextrose 50% Injectable 12.5 Gram(s) IV Push once  dextrose 50% Injectable 25 Gram(s) IV Push once  dextrose 50% Injectable 25 Gram(s) IV Push once  epoetin cyndie Injectable 4000 Unit(s) IV Push <User Schedule>  gabapentin 400 milliGRAM(s) Oral daily  heparin  Injectable 5000 Unit(s) SubCutaneous every 8 hours  hydrOXYzine hydrochloride 25 milliGRAM(s) Oral daily  insulin glargine Injectable (LANTUS) 20 Unit(s) SubCutaneous at bedtime  insulin lispro (HumaLOG) corrective regimen sliding scale   SubCutaneous three times a day before meals  insulin lispro (HumaLOG) corrective regimen sliding scale   SubCutaneous at bedtime  insulin lispro Injectable (HumaLOG) 10 Unit(s) SubCutaneous three times a day before meals  mirtazapine 45 milliGRAM(s) Oral at bedtime  multivitamin 1 Tablet(s) Oral daily  pantoprazole    Tablet 40 milliGRAM(s) Oral before breakfast  QUEtiapine 100 milliGRAM(s) Oral daily  sevelamer hydrochloride 800 milliGRAM(s) Oral <User Schedule>  simvastatin 40 milliGRAM(s) Oral at bedtime    MEDICATIONS  (PRN):  acetaminophen   Tablet .. 650 milliGRAM(s) Oral every 6 hours PRN Moderate Pain (4 - 6)  dextrose 40% Gel 15 Gram(s) Oral once PRN Blood Glucose LESS THAN 70 milliGRAM(s)/deciliter  glucagon  Injectable 1 milliGRAM(s) IntraMuscular once PRN Glucose LESS THAN 70 milligrams/deciliter      ***********************************************************************************************************    Allergies  aspirin (Short breath)  penicillin (Short breath)  shellfish (Unknown)            ***********************************************************************************************************    REVIEW OF SYSTEMS :  * CONSTITUTIONAL      : No Fever, Weight loss, or Fatigue  * EYES                          :No eye pain , Visual disturbances or Discharge  * RESPIRATORY             : No Cough, Wheezing, Chills or Hemoptysis; No shortness of breath  * CARDIOVASCULAR     : No Chest pain, Palpitations, Dizziness, or Leg swelling  * GASTROINTESTINAL  : No Abdominal or Epigastric pain. No Nausea, Vomiting or Hematemesis; No Diarrhea or Constipation. No Melena or Hematochezia.  * GENITOURINARY        : No Dysuria , Frequency , Haematuria   * NEUROLOGICAL          : No Headaches, Memory loss, Loss of strength, Numbness, or Tremors  * MUSCULOSKELETAL   : No Joint pain  * PSYCHIATRY                 : No Depression or Anxiety   * HEME/LYMPH              : No Easy Bruising or Bleeding gums  * SKIN                            : No Itching, Burning, Rashes, or Lesions     ***********************************************************************************************************    Vital Signs Last 24 Hrs  T(C): 36.8 (04 Mar 2019 11:53), Max: 37.1 (03 Mar 2019 17:34)  T(F): 98.2 (04 Mar 2019 11:53), Max: 98.7 (03 Mar 2019 17:34)  HR: 88 (04 Mar 2019 11:53) (88 - 104)  BP: 149/83 (04 Mar 2019 11:53) (123/63 - 163/102)  RR: 18 (04 Mar 2019 11:53) (16 - 19)  SpO2: 100% (04 Mar 2019 11:53) (96% - 100%)    ***********************************************************************************************************    PHYSICAL EXAM :  * GENERAL                 : NAD, Well-groomed, Well-developed  * HEAD                       :  Atraumatic, Normocephalic  * EYES                         : EOMI, PERRL, Conjunctiva and Sclera clear  * ENT                           : Moist Mucous Membranes  * NECK                         : Supple, No JVD, Normal Thyroid  * CHEST/LUNG           : Clear to Auscultation bilaterally; No Rales, Rhonchi, Wheezing or Rubs  * HEART                       : Regular Rate and Rhythm; No murmurs, Rubs or gallops  * ABDOMEN                : Soft, Non-tender, Non-distended; Bowel Sounds present  * NERVOUS SYSTEM  :  Alert & Oriented X3, Good Concentration; Motor Strength 5/5 B/L UL LL ; DTRs 2+ Intact and Symmetric  * EXTREMITIES            :  2+ Peripheral Pulses, No clubbing, cyanosis, or edema  * SKIN                           : No Rashes or Lesions    **********************************************************************************************************  LABS:                          10.0   11.19 )-----------( 354      ( 04 Mar 2019 06:54 )             31.7     03-04    135  |  99  |  57<H>  ----------------------------<  207<H>  4.5   |  27  |  10.40<H>    Ca    8.9      04 Mar 2019 10:53  Phos  8.9     03-04  Mg     2.7     03-04    TPro  7.8  /  Alb  2.8<L>  /  TBili  0.3  /  DBili  x   /  AST  21  /  ALT  21  /  AlkPhos  91  03-04        CAPILLARY BLOOD GLUCOSE  POCT Blood Glucose.: 213 mg/dL (04 Mar 2019 10:49)  POCT Blood Glucose.: 122 mg/dL (04 Mar 2019 08:11)  POCT Blood Glucose.: 195 mg/dL (03 Mar 2019 21:44)  POCT Blood Glucose.: 146 mg/dL (03 Mar 2019 17:00)      **********************************************************************************************************    RADIOLOGY & ADDITIONAL TESTS:   No radiological imaging was required    Imaging Personally Reviewed:  [x ] YES  [ ] NO    Consultant(s) Notes Reviewed:  [x ] YES  [ ] NO PGY1 Note discussed with Supervising Resident and Primary Attending.    Patient is a 38 year old  Male who presents with a chief complaint of SOB (03 Mar 2019 20:01)      INTERVAL HPI/OVERNIGHT EVENTS : patient reports he feels better  No acute events reported overnight.    Pt is seen at the bedside. Pt is found resting comfortably in bed in no acute distress, reports feeling well and denies any new complaints today. Patient denies nausea, vomiting, diarrhea, chest pain, SOB, headache, dizziness.     **********************************************************************************************    MEDICATIONS  (STANDING):  cinacalcet 30 milliGRAM(s) Oral daily  dextrose 5%. 1000 milliLiter(s) (50 mL/Hr) IV Continuous <Continuous>  dextrose 50% Injectable 12.5 Gram(s) IV Push once  dextrose 50% Injectable 25 Gram(s) IV Push once  dextrose 50% Injectable 25 Gram(s) IV Push once  epoetin cyndie Injectable 4000 Unit(s) IV Push <User Schedule>  gabapentin 400 milliGRAM(s) Oral daily  heparin  Injectable 5000 Unit(s) SubCutaneous every 8 hours  hydrOXYzine hydrochloride 25 milliGRAM(s) Oral daily  insulin glargine Injectable (LANTUS) 20 Unit(s) SubCutaneous at bedtime  insulin lispro (HumaLOG) corrective regimen sliding scale   SubCutaneous three times a day before meals  insulin lispro (HumaLOG) corrective regimen sliding scale   SubCutaneous at bedtime  insulin lispro Injectable (HumaLOG) 10 Unit(s) SubCutaneous three times a day before meals  mirtazapine 45 milliGRAM(s) Oral at bedtime  multivitamin 1 Tablet(s) Oral daily  pantoprazole    Tablet 40 milliGRAM(s) Oral before breakfast  QUEtiapine 100 milliGRAM(s) Oral daily  sevelamer hydrochloride 800 milliGRAM(s) Oral <User Schedule>  simvastatin 40 milliGRAM(s) Oral at bedtime    MEDICATIONS  (PRN):  acetaminophen   Tablet .. 650 milliGRAM(s) Oral every 6 hours PRN Moderate Pain (4 - 6)  dextrose 40% Gel 15 Gram(s) Oral once PRN Blood Glucose LESS THAN 70 milliGRAM(s)/deciliter  glucagon  Injectable 1 milliGRAM(s) IntraMuscular once PRN Glucose LESS THAN 70 milligrams/deciliter      ***********************************************************************************************************    Allergies  aspirin (Short breath)  penicillin (Short breath)  shellfish (Unknown)            ***********************************************************************************************************    REVIEW OF SYSTEMS :  · Negative General Symptoms	no fever; no chills; no sweating	  · General Symptoms	anorexia;	  · Negative Skin Symptoms	no rash	  · Ophthalmologic	negative	  · Negative Respiratory and Thorax Symptoms	no wheezing	  · Respiratory and Thorax Symptoms	dyspnea  	  · Negative Cardiovascular Symptoms	no chest pain	  · Cardiovascular Symptoms	; no dyspnea on exertion; orthopnea; paroxysmal nocturnal dyspnea	  · Negative Gastrointestinal Symptoms	no nausea; no vomiting; no diarrhea; no constipation; no change in bowel habits; no abdominal pain	  · Negative General Genitourinary Symptoms	no hematuria; no urine discoloration; no dysuria	  · Negative Musculoskeletal Symptoms	no arthralgia; no arthritis; no joint swelling; no muscle cramps	  · Musculoskeletal Comments	left leg numbness	  · Negative Neurological Symptoms	no headache	    ***********************************************************************************************************    Vital Signs Last 24 Hrs  T(C): 36.8 (04 Mar 2019 11:53), Max: 37.1 (03 Mar 2019 17:34)  T(F): 98.2 (04 Mar 2019 11:53), Max: 98.7 (03 Mar 2019 17:34)  HR: 88 (04 Mar 2019 11:53) (88 - 104)  BP: 149/83 (04 Mar 2019 11:53) (123/63 - 163/102)  RR: 18 (04 Mar 2019 11:53) (16 - 19)  SpO2: 100% (04 Mar 2019 11:53) (96% - 100%)    ***********************************************************************************************************    PHYSICAL EXAM :  * GENERAL                 : NAD,, Morbid obese   * HEAD                       :  Atraumatic, Normocephalic  * EYES                         : EOMI, PERRL, Conjunctiva and Sclera clear  * ENT                           : Moist Mucous Membranes  * NECK                         : Supple, No JVD, Normal Thyroid  * CHEST/LUNG           : Clear to Auscultation bilaterally; No Rales, Rhonchi, Wheezing or Rubs  * HEART                       : Regular Rate and Rhythm; No murmurs, Rubs or gallops  * ABDOMEN                : Soft, Non-tender, Non-distended; Bowel Sounds present  * NERVOUS SYSTEM  :  Alert & Oriented X3, Good Concentration; Motor Strength 5/5 B/L UL LL ; DTRs 2+ Intact and Symmetric  * EXTREMITIES            :  2+ Peripheral Pulses, No clubbing, cyanosis, or edema  * SKIN                           : No Rashes or Lesions    **********************************************************************************************************  LABS:                          10.0   11.19 )-----------( 354      ( 04 Mar 2019 06:54 )             31.7     03-04    135  |  99  |  57<H>  ----------------------------<  207<H>  4.5   |  27  |  10.40<H>    Ca    8.9      04 Mar 2019 10:53  Phos  8.9     03-04  Mg     2.7     03-04    TPro  7.8  /  Alb  2.8<L>  /  TBili  0.3  /  DBili  x   /  AST  21  /  ALT  21  /  AlkPhos  91  03-04        CAPILLARY BLOOD GLUCOSE  POCT Blood Glucose.: 213 mg/dL (04 Mar 2019 10:49)  POCT Blood Glucose.: 122 mg/dL (04 Mar 2019 08:11)  POCT Blood Glucose.: 195 mg/dL (03 Mar 2019 21:44)  POCT Blood Glucose.: 146 mg/dL (03 Mar 2019 17:00)      **********************************************************************************************************    RADIOLOGY & ADDITIONAL TESTS:   No radiological imaging was required    Imaging Personally Reviewed:  [x ] YES  [ ] NO    Consultant(s) Notes Reviewed:  [x ] YES  [ ] NO

## 2019-03-04 NOTE — DISCHARGE NOTE PROVIDER - NSDCCPCAREPLAN_GEN_ALL_CORE_FT
PRINCIPAL DISCHARGE DIAGNOSIS  Problem: Fluid overload  Assessment and Plan of Treatment: To complete resolution of symptoms and follow up with pulmonary doctor.  You came to the hospital with difficulty in breathing and wheezing you had chest xray did not show pneumonia, possibly from fluid overload   You got dialysis during your admission   Cardiologist consulted for your Shortness of breath Dr. Parks and Darlyn pinonformed which came normal and recommended getting stress test as outpatient as Camera of stress mounika will not be able to perform the test here with morbid obesity  - You are medically stable to be discharged from the hospital.  - You need to follow up with your Primary Care Physician in 1 week.  - You need to continue your medications regularly as prescribed.    Please see your pulmonologist with in a week to get Pulmonary functions test as outpatient      SECONDARY DISCHARGE DIAGNOSES  Problem: ESRD (end stage renal disease)  Assessment and Plan of Treatment: Continue dialysis.  - You have a history of ESRD.   - You were dialyzed during your stay and your BMP was monitored.   - You should continue following your established dialysis schedule.    Problem: HTN (hypertension)  Assessment and Plan of Treatment: Blood Pressure Control , Please continue current medication regimen, and follow up with your PCP  - You have a history of Hypertension.   - Your Blood Pressure was adequately controlled with amlodipine and Lisinopril .   - You should continue on the current antihypertensive regimen regularly.  - You blood pressure should be within 140-120/80-90.  - You should follow-up with your PCP within 1 week of your discharge for routine blood pressure monitoring at your next visit.  - Notify your doctor if you have any of the following symptoms:   (Dizziness, Lightheadedness, Blurry vision, Headache, Chest pain, Shortness of breath.)  - You should maintain healthy lifestyle by eating healthy low salt diet, avoid fatty food, weight loss, exercise regularly as tolerated 30 mins X 3 time per week.    Problem: Diabetes  Assessment and Plan of Treatment: Maintaining blood glucose level within normal range.  - You have a history of diabetes  - Your HbA1c is 6.4  - You should continue to take your medication regimen regularly as prescribed  - Please follow up with your primary care provider/endocrinologist within a week of discharge.  - You need to continue monitoring your blood sugar levels closely.  - Please maintain healthy lifestyle by eating healthy diabetic regimen, weight loss and exercise regularly as tolerated.  Make sure you get your HgA1c checked every three months.  If you take oral diabetes medications, check your blood glucose two times a day.  If you take insulin, check your blood glucose before meals and at bedtime.  It's important not to skip any meals.  Keep a log of your blood glucose results and always take it with you to your doctor appointments.  Keep a list of your current medications including injectables and over the counter medications and bring this medication list with you to all your doctor appointments.  If you have not seen your ophthalmologist this year call for appointment.  Check your feet daily for redness, sores, or openings. Do not self treat. If no improvement in two days call your primary care physician for an appointment.  Low blood sugar (hypoglycemia) is a blood sugar below 70mg/dl. Check your blood sugar if you feel signs/symptoms of hypoglycemia. If your blood sugar is below 70 take 15 grams of carbohydrates (ex 4 oz of apple juice, 3-4 glucose tablets, or 4-6 oz of regular soda) wait 15 minutes and repeat blood sugar to make sure it comes up above 70.  If your blood sugar is above 70 and you are due for a meal, have a meal.  If you are not due for a meal have a snack.  This snack helps keeps your blood sugar at a safe range.    Problem: Smoking addiction  Assessment and Plan of Treatment: quit smoking.  Please try to stop smoking.   Please use nicotine patch as prescribed.   If you need any assistance, please inform your doctor. PRINCIPAL DISCHARGE DIAGNOSIS  Problem: Fluid overload  Assessment and Plan of Treatment: To complete resolution of symptoms and follow up with pulmonary doctor.  You came to the hospital with difficulty in breathing and wheezing you had chest xray did not show pneumonia, possibly from fluid overload   You got dialysis during your admission   Cardiologist consulted for your Shortness of breath Dr. Parks and Echo was performed which came normal and recommended getting stress test as outpatient as Camera of stress mounika will not be able to perform the test here with morbid obesity  - You are medically stable to be discharged from the hospital.  - You need to follow up with your Primary Care Physician in 1 week.  - You need to continue your medications regularly as prescribed.    Please see your pulmonologist with in a week to get Pulmonary functions test as outpatient      SECONDARY DISCHARGE DIAGNOSES  Problem: ESRD (end stage renal disease)  Assessment and Plan of Treatment: Continue dialysis.  - You have a history of ESRD.   - You were dialyzed during your stay and your BMP was monitored.   - You should continue following your established dialysis schedule.    Problem: HTN (hypertension)  Assessment and Plan of Treatment: Blood Pressure Control , Please continue current medication regimen, and follow up with your PCP  - You have a history of Hypertension.   - Your Blood Pressure was adequately controlled with amlodipine and Lisinopril .   - You should continue on the current antihypertensive regimen regularly.  - You blood pressure should be within 140-120/80-90.  - You should follow-up with your PCP within 1 week of your discharge for routine blood pressure monitoring at your next visit.  - Notify your doctor if you have any of the following symptoms:   (Dizziness, Lightheadedness, Blurry vision, Headache, Chest pain, Shortness of breath.)  - You should maintain healthy lifestyle by eating healthy low salt diet, avoid fatty food, weight loss, exercise regularly as tolerated 30 mins X 3 time per week.    Problem: Diabetes  Assessment and Plan of Treatment: Maintaining blood glucose level within normal range.  - You have a history of diabetes  - Your HbA1c is 6.4  - You should continue to take your medication regimen regularly as prescribed  - Please follow up with your primary care provider/endocrinologist within a week of discharge.  - You need to continue monitoring your blood sugar levels closely.  - Please maintain healthy lifestyle by eating healthy diabetic regimen, weight loss and exercise regularly as tolerated.  Make sure you get your HgA1c checked every three months.  If you take oral diabetes medications, check your blood glucose two times a day.  If you take insulin, check your blood glucose before meals and at bedtime.  It's important not to skip any meals.  Keep a log of your blood glucose results and always take it with you to your doctor appointments.  Keep a list of your current medications including injectables and over the counter medications and bring this medication list with you to all your doctor appointments.  If you have not seen your ophthalmologist this year call for appointment.  Check your feet daily for redness, sores, or openings. Do not self treat. If no improvement in two days call your primary care physician for an appointment.  Low blood sugar (hypoglycemia) is a blood sugar below 70mg/dl. Check your blood sugar if you feel signs/symptoms of hypoglycemia. If your blood sugar is below 70 take 15 grams of carbohydrates (ex 4 oz of apple juice, 3-4 glucose tablets, or 4-6 oz of regular soda) wait 15 minutes and repeat blood sugar to make sure it comes up above 70.  If your blood sugar is above 70 and you are due for a meal, have a meal.  If you are not due for a meal have a snack.  This snack helps keeps your blood sugar at a safe range.    Problem: Smoking addiction  Assessment and Plan of Treatment: quit smoking.  Please try to stop smoking.   Please use nicotine patch as prescribed.   If you need any assistance, please inform your doctor.    Problem: Morbid obesity  Assessment and Plan of Treatment: BMI 41.7, educated on life style modification

## 2019-03-04 NOTE — PROGRESS NOTE ADULT - PROBLEM SELECTOR PLAN 3
C/w home insulin lantus 10 units and novalog 20 tid  f/u hba1c C/w home insulin lantus 10 units and novalog 20 tid  hba1c 6.4

## 2019-03-04 NOTE — PROGRESS NOTE ADULT - SUBJECTIVE AND OBJECTIVE BOX
Patient denies chest pain or shortness of breath.   Review of systems otherwise (-)  	  MEDICATIONS:  MEDICATIONS  (STANDING):  cinacalcet 30 milliGRAM(s) Oral daily  dextrose 5%. 1000 milliLiter(s) (50 mL/Hr) IV Continuous <Continuous>  dextrose 50% Injectable 12.5 Gram(s) IV Push once  dextrose 50% Injectable 25 Gram(s) IV Push once  dextrose 50% Injectable 25 Gram(s) IV Push once  epoetin cyndie Injectable 4000 Unit(s) IV Push <User Schedule>  gabapentin 400 milliGRAM(s) Oral daily  heparin  Injectable 5000 Unit(s) SubCutaneous every 8 hours  hydrOXYzine hydrochloride 25 milliGRAM(s) Oral daily  insulin glargine Injectable (LANTUS) 20 Unit(s) SubCutaneous at bedtime  insulin lispro (HumaLOG) corrective regimen sliding scale   SubCutaneous three times a day before meals  insulin lispro (HumaLOG) corrective regimen sliding scale   SubCutaneous at bedtime  insulin lispro Injectable (HumaLOG) 10 Unit(s) SubCutaneous three times a day before meals  mirtazapine 45 milliGRAM(s) Oral at bedtime  multivitamin 1 Tablet(s) Oral daily  pantoprazole    Tablet 40 milliGRAM(s) Oral before breakfast  QUEtiapine 100 milliGRAM(s) Oral daily  sevelamer hydrochloride 800 milliGRAM(s) Oral <User Schedule>  simvastatin 40 milliGRAM(s) Oral at bedtime      LABS:	 	    CARDIAC MARKERS:  CARDIAC MARKERS ( 03 Mar 2019 10:50 )  <0.015 ng/mL / x     / x     / x     / x      CARDIAC MARKERS ( 02 Mar 2019 20:16 )  0.015 ng/mL / x     / x     / x     / x                                    10.0   11.19 )-----------( 354      ( 04 Mar 2019 06:54 )             31.7     Hemoglobin: 10.0 g/dL (03-04 @ 06:54)  Hemoglobin: 10.1 g/dL (03-03 @ 10:50)  Hemoglobin: 9.8 g/dL (03-02 @ 20:16)      03-04    135  |  99  |  57<H>  ----------------------------<  207<H>  4.5   |  27  |  10.40<H>    Ca    8.9      04 Mar 2019 10:53  Phos  8.9     03-04  Mg     2.7     03-04    TPro  7.8  /  Alb  2.8<L>  /  TBili  0.3  /  DBili  x   /  AST  21  /  ALT  21  /  AlkPhos  91  03-04    Creatinine Trend: 10.40<--, 10.20<--, 8.84<--, 11.70<--    HgA1c: Hemoglobin A1C, Whole Blood: 6.4 % (03-04 @ 10:46)    TSH:       PHYSICAL EXAM:  T(C): 36.8 (03-04-19 @ 11:53), Max: 37.1 (03-03-19 @ 17:34)  HR: 88 (03-04-19 @ 11:53) (88 - 104)  BP: 149/83 (03-04-19 @ 11:53) (123/63 - 163/102)  RR: 18 (03-04-19 @ 11:53) (16 - 19)  SpO2: 100% (03-04-19 @ 11:53) (96% - 100%)  Wt(kg): --  I&O's Summary        Gen: Appears well in NAD  HEENT:  (-)icterus (-)pallor  CV: N S1 S2 1/6 ERNESTINE (+)2 Pulses B/l  Resp:  Clear to ausculatation B/L, normal effort  GI: (+) BS Soft, NT, ND  Lymph:  (-)Edema, (-)obvious lymphadenopathy  Skin: Warm to touch, Normal turgor  Psych: Appropriate mood and affect      TELEMETRY: 	  sinus        ASSESSMENT/PLAN: 	38y  Male PMHX of type 2 DM, Migraine, ESRD (left arm graft, TTS) presented to ED after missing dialysis today with complain of SOB for a week. SOB is on and off, aggravated with exertion,    - f/u Echo  - HD perrenal  - ischemic eval pending above    Clifford Parks MD, [facility]  BEEPER [phone]

## 2019-03-05 ENCOUNTER — TRANSCRIPTION ENCOUNTER (OUTPATIENT)
Age: 39
End: 2019-03-05

## 2019-03-05 VITALS
OXYGEN SATURATION: 96 % | DIASTOLIC BLOOD PRESSURE: 92 MMHG | SYSTOLIC BLOOD PRESSURE: 142 MMHG | RESPIRATION RATE: 18 BRPM | HEART RATE: 101 BPM

## 2019-03-05 LAB
ALBUMIN SERPL ELPH-MCNC: 2.8 G/DL — LOW (ref 3.5–5)
ALP SERPL-CCNC: 90 U/L — SIGNIFICANT CHANGE UP (ref 40–120)
ALT FLD-CCNC: 19 U/L DA — SIGNIFICANT CHANGE UP (ref 10–60)
ANION GAP SERPL CALC-SCNC: 10 MMOL/L — SIGNIFICANT CHANGE UP (ref 5–17)
AST SERPL-CCNC: 11 U/L — SIGNIFICANT CHANGE UP (ref 10–40)
BASOPHILS # BLD AUTO: 0.05 K/UL — SIGNIFICANT CHANGE UP (ref 0–0.2)
BASOPHILS NFR BLD AUTO: 0.4 % — SIGNIFICANT CHANGE UP (ref 0–2)
BILIRUB SERPL-MCNC: 0.3 MG/DL — SIGNIFICANT CHANGE UP (ref 0.2–1.2)
BUN SERPL-MCNC: 70 MG/DL — HIGH (ref 7–18)
CALCIUM SERPL-MCNC: 8.7 MG/DL — SIGNIFICANT CHANGE UP (ref 8.4–10.5)
CHLORIDE SERPL-SCNC: 103 MMOL/L — SIGNIFICANT CHANGE UP (ref 96–108)
CO2 SERPL-SCNC: 22 MMOL/L — SIGNIFICANT CHANGE UP (ref 22–31)
CREAT SERPL-MCNC: 11.8 MG/DL — HIGH (ref 0.5–1.3)
EOSINOPHIL # BLD AUTO: 0.54 K/UL — HIGH (ref 0–0.5)
EOSINOPHIL NFR BLD AUTO: 4.7 % — SIGNIFICANT CHANGE UP (ref 0–6)
GLUCOSE SERPL-MCNC: 151 MG/DL — HIGH (ref 70–99)
HCT VFR BLD CALC: 30.1 % — LOW (ref 39–50)
HGB BLD-MCNC: 9.6 G/DL — LOW (ref 13–17)
IMM GRANULOCYTES NFR BLD AUTO: 0.5 % — SIGNIFICANT CHANGE UP (ref 0–1.5)
LYMPHOCYTES # BLD AUTO: 2.3 K/UL — SIGNIFICANT CHANGE UP (ref 1–3.3)
LYMPHOCYTES # BLD AUTO: 20.2 % — SIGNIFICANT CHANGE UP (ref 13–44)
MAGNESIUM SERPL-MCNC: 2.5 MG/DL — SIGNIFICANT CHANGE UP (ref 1.6–2.6)
MCHC RBC-ENTMCNC: 31.6 PG — SIGNIFICANT CHANGE UP (ref 27–34)
MCHC RBC-ENTMCNC: 31.9 GM/DL — LOW (ref 32–36)
MCV RBC AUTO: 99 FL — SIGNIFICANT CHANGE UP (ref 80–100)
MONOCYTES # BLD AUTO: 0.69 K/UL — SIGNIFICANT CHANGE UP (ref 0–0.9)
MONOCYTES NFR BLD AUTO: 6.1 % — SIGNIFICANT CHANGE UP (ref 2–14)
NEUTROPHILS # BLD AUTO: 7.76 K/UL — HIGH (ref 1.8–7.4)
NEUTROPHILS NFR BLD AUTO: 68.1 % — SIGNIFICANT CHANGE UP (ref 43–77)
NRBC # BLD: 0 /100 WBCS — SIGNIFICANT CHANGE UP (ref 0–0)
PHOSPHATE SERPL-MCNC: 9.1 MG/DL — SIGNIFICANT CHANGE UP (ref 2.5–4.5)
PLATELET # BLD AUTO: 358 K/UL — SIGNIFICANT CHANGE UP (ref 150–400)
POTASSIUM SERPL-MCNC: 5.7 MMOL/L — HIGH (ref 3.5–5.3)
POTASSIUM SERPL-SCNC: 5.7 MMOL/L — HIGH (ref 3.5–5.3)
PROT SERPL-MCNC: 7.4 G/DL — SIGNIFICANT CHANGE UP (ref 6–8.3)
RBC # BLD: 3.04 M/UL — LOW (ref 4.2–5.8)
RBC # FLD: 14.8 % — HIGH (ref 10.3–14.5)
SODIUM SERPL-SCNC: 135 MMOL/L — SIGNIFICANT CHANGE UP (ref 135–145)
WBC # BLD: 11.4 K/UL — HIGH (ref 3.8–10.5)
WBC # FLD AUTO: 11.4 K/UL — HIGH (ref 3.8–10.5)

## 2019-03-05 RX ORDER — INFLUENZA VIRUS VACCINE 15; 15; 15; 15 UG/.5ML; UG/.5ML; UG/.5ML; UG/.5ML
0.5 SUSPENSION INTRAMUSCULAR ONCE
Qty: 0 | Refills: 0 | Status: COMPLETED | OUTPATIENT
Start: 2019-03-05 | End: 2019-03-05

## 2019-03-05 RX ORDER — ACETAMINOPHEN 500 MG
1000 TABLET ORAL ONCE
Qty: 0 | Refills: 0 | Status: COMPLETED | OUTPATIENT
Start: 2019-03-05 | End: 2019-03-05

## 2019-03-05 RX ORDER — AMLODIPINE BESYLATE 2.5 MG/1
2.5 TABLET ORAL ONCE
Qty: 0 | Refills: 0 | Status: COMPLETED | OUTPATIENT
Start: 2019-03-05 | End: 2019-03-05

## 2019-03-05 RX ADMIN — HEPARIN SODIUM 5000 UNIT(S): 5000 INJECTION INTRAVENOUS; SUBCUTANEOUS at 06:19

## 2019-03-05 RX ADMIN — AMLODIPINE BESYLATE 2.5 MILLIGRAM(S): 2.5 TABLET ORAL at 03:49

## 2019-03-05 RX ADMIN — Medication 10 UNIT(S): at 17:21

## 2019-03-05 RX ADMIN — ERYTHROPOIETIN 4000 UNIT(S): 10000 INJECTION, SOLUTION INTRAVENOUS; SUBCUTANEOUS at 10:54

## 2019-03-05 RX ADMIN — Medication 400 MILLIGRAM(S): at 01:45

## 2019-03-05 RX ADMIN — Medication 10 UNIT(S): at 08:37

## 2019-03-05 RX ADMIN — QUETIAPINE FUMARATE 100 MILLIGRAM(S): 200 TABLET, FILM COATED ORAL at 13:05

## 2019-03-05 RX ADMIN — SEVELAMER CARBONATE 800 MILLIGRAM(S): 2400 POWDER, FOR SUSPENSION ORAL at 07:43

## 2019-03-05 RX ADMIN — Medication 25 MILLIGRAM(S): at 13:05

## 2019-03-05 RX ADMIN — Medication 1 TABLET(S): at 13:05

## 2019-03-05 RX ADMIN — CINACALCET 30 MILLIGRAM(S): 30 TABLET, FILM COATED ORAL at 13:06

## 2019-03-05 RX ADMIN — Medication 400 MILLIGRAM(S): at 15:53

## 2019-03-05 RX ADMIN — INFLUENZA VIRUS VACCINE 0.5 MILLILITER(S): 15; 15; 15; 15 SUSPENSION INTRAMUSCULAR at 16:51

## 2019-03-05 RX ADMIN — PANTOPRAZOLE SODIUM 40 MILLIGRAM(S): 20 TABLET, DELAYED RELEASE ORAL at 06:19

## 2019-03-05 RX ADMIN — Medication 2: at 17:20

## 2019-03-05 RX ADMIN — Medication 1000 MILLIGRAM(S): at 02:30

## 2019-03-05 RX ADMIN — Medication 1000 MILLIGRAM(S): at 16:30

## 2019-03-05 RX ADMIN — GABAPENTIN 400 MILLIGRAM(S): 400 CAPSULE ORAL at 13:06

## 2019-03-05 RX ADMIN — Medication 10 UNIT(S): at 13:05

## 2019-03-05 RX ADMIN — HEPARIN SODIUM 5000 UNIT(S): 5000 INJECTION INTRAVENOUS; SUBCUTANEOUS at 13:05

## 2019-03-05 NOTE — CHART NOTE - NSCHARTNOTEFT_GEN_A_CORE
Patient is medically stable for discharge home today. He is s/p HD today. See discharge note for details.  Kassidy Patel MD

## 2019-03-05 NOTE — PROGRESS NOTE ADULT - SUBJECTIVE AND OBJECTIVE BOX
[facility] : Progress Note :: [phone], (office [phone]),   Dr La / Dr Hui / Dr Barber / Dr Villalobos / Dr Hang CASTELLANO / Dr Mehta / Dr Sanchez / Dr Alber dumont  _____________________________________________________________________________________________  complained of pain in AVF cannulation site. Did not complete HD     aspirin (Short breath)  penicillin (Short breath)  shellfish (Unknown)    Hospital Medications:   MEDICATIONS  (STANDING):  cinacalcet 30 milliGRAM(s) Oral daily  dextrose 5%. 1000 milliLiter(s) (50 mL/Hr) IV Continuous <Continuous>  dextrose 50% Injectable 12.5 Gram(s) IV Push once  dextrose 50% Injectable 25 Gram(s) IV Push once  dextrose 50% Injectable 25 Gram(s) IV Push once  epoetin cyndie Injectable 4000 Unit(s) IV Push <User Schedule>  gabapentin 400 milliGRAM(s) Oral daily  heparin  Injectable 5000 Unit(s) SubCutaneous every 8 hours  hydrOXYzine hydrochloride 25 milliGRAM(s) Oral daily  insulin glargine Injectable (LANTUS) 20 Unit(s) SubCutaneous at bedtime  insulin lispro (HumaLOG) corrective regimen sliding scale   SubCutaneous three times a day before meals  insulin lispro (HumaLOG) corrective regimen sliding scale   SubCutaneous at bedtime  insulin lispro Injectable (HumaLOG) 10 Unit(s) SubCutaneous three times a day before meals  mirtazapine 45 milliGRAM(s) Oral at bedtime  multivitamin 1 Tablet(s) Oral daily  pantoprazole    Tablet 40 milliGRAM(s) Oral before breakfast  QUEtiapine 100 milliGRAM(s) Oral daily  sevelamer hydrochloride 800 milliGRAM(s) Oral <User Schedule>  simvastatin 40 milliGRAM(s) Oral at bedtime        VITALS:  T(F): 98.7 (03-05-19 @ 12:42), Max: 98.8 (03-05-19 @ 03:00)  HR: 100 (03-05-19 @ 12:42)  BP: 161/100 (03-05-19 @ 12:42)  RR: 16 (03-05-19 @ 12:42)  SpO2: 99% (03-05-19 @ 12:42)  Wt(kg): --      PHYSICAL EXAM:  Constitutional: NAD  HEENT: anicteric sclera, oropharynx clear  Neck: No JVD  Respiratory: CTAB, no wheezes, rales or rhonchi  Cardiovascular: S1, S2, RRR  Gastrointestinal: BS+, soft, NT/ND  Extremities: No cyanosis or clubbing. No peripheral edema  Neurological: A/O x 3, no focal deficits  Vascular Access: AVF with thrill and bruit. pseudoaneurysms    LABS:  03-05    135  |  103  |  70<H>  ----------------------------<  151<H>  5.7<H>   |  22  |  11.80<H>    Ca    8.7      05 Mar 2019 09:26  Phos  9.1     03-05  Mg     2.5     03-05    TPro  7.4  /  Alb  2.8<L>  /  TBili  0.3  /  DBili      /  AST  11  /  ALT  19  /  AlkPhos  90  03-05    Creatinine Trend: 11.80 <--, 10.40 <--, 10.20 <--, 8.84 <--, 11.70 <--                        9.6    11.40 )-----------( 358      ( 05 Mar 2019 09:26 )             30.1     Urine Studies:      RADIOLOGY & ADDITIONAL STUDIES:

## 2019-03-05 NOTE — PROGRESS NOTE ADULT - SUBJECTIVE AND OBJECTIVE BOX
Patient denies chest pain or shortness of breath.   Review of systems otherwise (-)  	  acetaminophen   Tablet .. 650 milliGRAM(s) Oral every 6 hours PRN  cinacalcet 30 milliGRAM(s) Oral daily  dextrose 40% Gel 15 Gram(s) Oral once PRN  dextrose 5%. 1000 milliLiter(s) IV Continuous <Continuous>  dextrose 50% Injectable 12.5 Gram(s) IV Push once  dextrose 50% Injectable 25 Gram(s) IV Push once  dextrose 50% Injectable 25 Gram(s) IV Push once  epoetin cyndie Injectable 4000 Unit(s) IV Push <User Schedule>  gabapentin 400 milliGRAM(s) Oral daily  glucagon  Injectable 1 milliGRAM(s) IntraMuscular once PRN  heparin  Injectable 5000 Unit(s) SubCutaneous every 8 hours  hydrOXYzine hydrochloride 25 milliGRAM(s) Oral daily  insulin glargine Injectable (LANTUS) 20 Unit(s) SubCutaneous at bedtime  insulin lispro (HumaLOG) corrective regimen sliding scale   SubCutaneous three times a day before meals  insulin lispro (HumaLOG) corrective regimen sliding scale   SubCutaneous at bedtime  insulin lispro Injectable (HumaLOG) 10 Unit(s) SubCutaneous three times a day before meals  mirtazapine 45 milliGRAM(s) Oral at bedtime  multivitamin 1 Tablet(s) Oral daily  pantoprazole    Tablet 40 milliGRAM(s) Oral before breakfast  QUEtiapine 100 milliGRAM(s) Oral daily  sevelamer hydrochloride 800 milliGRAM(s) Oral <User Schedule>  simvastatin 40 milliGRAM(s) Oral at bedtime                            9.6    11.40 )-----------( 358      ( 05 Mar 2019 09:26 )             30.1       Hemoglobin: 9.6 g/dL (03-05 @ 09:26)  Hemoglobin: 10.0 g/dL (03-04 @ 06:54)  Hemoglobin: 10.1 g/dL (03-03 @ 10:50)  Hemoglobin: 9.8 g/dL (03-02 @ 20:16)      03-05    135  |  103  |  70<H>  ----------------------------<  151<H>  5.7<H>   |  22  |  11.80<H>    Ca    8.7      05 Mar 2019 09:26  Phos  9.1     03-05  Mg     2.5     03-05    TPro  7.4  /  Alb  2.8<L>  /  TBili  0.3  /  DBili  x   /  AST  11  /  ALT  19  /  AlkPhos  90  03-05    Creatinine Trend: 11.80<--, 10.40<--, 10.20<--, 8.84<--, 11.70<--    COAGS:     CARDIAC MARKERS ( 03 Mar 2019 10:50 )  <0.015 ng/mL / x     / x     / x     / x      CARDIAC MARKERS ( 02 Mar 2019 20:16 )  0.015 ng/mL / x     / x     / x     / x            T(C): 37.1 (03-05-19 @ 12:42), Max: 37.1 (03-05-19 @ 03:00)  HR: 100 (03-05-19 @ 12:42) (91 - 100)  BP: 161/100 (03-05-19 @ 12:42) (141/87 - 172/108)  RR: 16 (03-05-19 @ 12:42) (16 - 18)  SpO2: 99% (03-05-19 @ 12:42) (97% - 100%)  Wt(kg): --    I&O's Summary    Gen: Appears well in NAD  HEENT:  (-)icterus (-)pallor  CV: N S1 S2 1/6 ERNESTINE (+)2 Pulses B/l  Resp:  Clear to ausculatation B/L, normal effort  GI: (+) BS Soft, NT, ND  Lymph:  (-)Edema, (-)obvious lymphadenopathy  Skin: Warm to touch, Normal turgor  Psych: Appropriate mood and affect      TELEMETRY: 	  sinus        ASSESSMENT/PLAN: 	38y  Male PMHX of type 2 DM, Migraine, ESRD (left arm graft, TTS) presented to ED after missing dialysis today with complain of SOB for a week. SOB is on and off, aggravated with exertion,    - echo noted, normal LV function  - Patient is over stress camera limit, since he has no angina and (-) CE this can be pursued on a higher capacity nuclear camera as an outpt  - HD per renal      Clifford Parks MD, [facility]  BEEPER [phone]

## 2019-03-05 NOTE — DISCHARGE NOTE NURSING/CASE MANAGEMENT/SOCIAL WORK - NSDCDPATPORTLINK_GEN_ALL_CORE
You can access the [facility] Portal, offered by [facility], by registering with the following website: [email or web address]

## 2019-03-05 NOTE — PROGRESS NOTE ADULT - ASSESSMENT
#ESRD admitted with SOB. S/P HD today. Has a recent AVF stent and is the cause of pain during HD. Will have proximal AVF cannulated.  # Anemia of CKD. procrit with HD   #renal osteodystrophy. cont sensipar and renvela.  no objection to D/C home.  will arrange for angioplasty as outpatient

## 2019-03-06 ENCOUNTER — APPOINTMENT (OUTPATIENT)
Dept: ENDOVASCULAR SURGERY | Facility: CLINIC | Age: 39
End: 2019-03-06
Payer: MEDICARE

## 2019-03-06 ENCOUNTER — APPOINTMENT (OUTPATIENT)
Dept: ENDOVASCULAR SURGERY | Facility: CLINIC | Age: 39
End: 2019-03-06

## 2019-03-06 PROCEDURE — 93990 DOPPLER FLOW TESTING: CPT

## 2019-03-06 PROCEDURE — 99212 OFFICE O/P EST SF 10 MIN: CPT

## 2019-03-09 ENCOUNTER — INPATIENT (INPATIENT)
Facility: HOSPITAL | Age: 39
LOS: 3 days | Discharge: HOME | DRG: 640 | End: 2019-03-13
Attending: INTERNAL MEDICINE | Admitting: INTERNAL MEDICINE
Payer: MEDICARE

## 2019-03-09 DIAGNOSIS — Z98.89 OTHER SPECIFIED POSTPROCEDURAL STATES: Chronic | ICD-10-CM

## 2019-03-10 VITALS
HEIGHT: 78 IN | DIASTOLIC BLOOD PRESSURE: 85 MMHG | TEMPERATURE: 100 F | WEIGHT: 315 LBS | HEART RATE: 106 BPM | RESPIRATION RATE: 20 BRPM | SYSTOLIC BLOOD PRESSURE: 152 MMHG | OXYGEN SATURATION: 98 %

## 2019-03-10 DIAGNOSIS — R06.02 SHORTNESS OF BREATH: ICD-10-CM

## 2019-03-10 DIAGNOSIS — I50.9 HEART FAILURE, UNSPECIFIED: ICD-10-CM

## 2019-03-10 DIAGNOSIS — E11.9 TYPE 2 DIABETES MELLITUS WITHOUT COMPLICATIONS: ICD-10-CM

## 2019-03-10 DIAGNOSIS — I10 ESSENTIAL (PRIMARY) HYPERTENSION: ICD-10-CM

## 2019-03-10 DIAGNOSIS — Z98.890 OTHER SPECIFIED POSTPROCEDURAL STATES: Chronic | ICD-10-CM

## 2019-03-10 DIAGNOSIS — Z29.9 ENCOUNTER FOR PROPHYLACTIC MEASURES, UNSPECIFIED: ICD-10-CM

## 2019-03-10 DIAGNOSIS — N18.6 END STAGE RENAL DISEASE: ICD-10-CM

## 2019-03-10 LAB
ANION GAP SERPL CALC-SCNC: 9 MMOL/L — SIGNIFICANT CHANGE UP (ref 5–17)
BASOPHILS # BLD AUTO: 0.07 K/UL — SIGNIFICANT CHANGE UP (ref 0–0.2)
BASOPHILS NFR BLD AUTO: 0.5 % — SIGNIFICANT CHANGE UP (ref 0–2)
BUN SERPL-MCNC: 46 MG/DL — HIGH (ref 7–18)
CALCIUM SERPL-MCNC: 8.2 MG/DL — LOW (ref 8.4–10.5)
CHLORIDE SERPL-SCNC: 101 MMOL/L — SIGNIFICANT CHANGE UP (ref 96–108)
CK MB BLD-MCNC: 0.8 % — SIGNIFICANT CHANGE UP (ref 0–3.5)
CK MB BLD-MCNC: 0.9 % — SIGNIFICANT CHANGE UP (ref 0–3.5)
CK MB BLD-MCNC: <0.8 % — SIGNIFICANT CHANGE UP (ref 0–3.5)
CK MB CFR SERPL CALC: 1 NG/ML — SIGNIFICANT CHANGE UP (ref 0–3.6)
CK MB CFR SERPL CALC: 1.1 NG/ML — SIGNIFICANT CHANGE UP (ref 0–3.6)
CK MB CFR SERPL CALC: <1 NG/ML — SIGNIFICANT CHANGE UP (ref 0–3.6)
CK SERPL-CCNC: 119 U/L — SIGNIFICANT CHANGE UP (ref 35–232)
CK SERPL-CCNC: 126 U/L — SIGNIFICANT CHANGE UP (ref 35–232)
CK SERPL-CCNC: 127 U/L — SIGNIFICANT CHANGE UP (ref 35–232)
CO2 SERPL-SCNC: 29 MMOL/L — SIGNIFICANT CHANGE UP (ref 22–31)
CREAT SERPL-MCNC: 7.65 MG/DL — HIGH (ref 0.5–1.3)
EOSINOPHIL # BLD AUTO: 0.63 K/UL — HIGH (ref 0–0.5)
EOSINOPHIL NFR BLD AUTO: 4.6 % — SIGNIFICANT CHANGE UP (ref 0–6)
FLU A RESULT: SIGNIFICANT CHANGE UP
FLU A RESULT: SIGNIFICANT CHANGE UP
FLUAV AG NPH QL: SIGNIFICANT CHANGE UP
FLUBV AG NPH QL: SIGNIFICANT CHANGE UP
GLUCOSE SERPL-MCNC: 109 MG/DL — HIGH (ref 70–99)
HCT VFR BLD CALC: 28 % — LOW (ref 39–50)
HGB BLD-MCNC: 9 G/DL — LOW (ref 13–17)
IMM GRANULOCYTES NFR BLD AUTO: 0.4 % — SIGNIFICANT CHANGE UP (ref 0–1.5)
LYMPHOCYTES # BLD AUTO: 2.8 K/UL — SIGNIFICANT CHANGE UP (ref 1–3.3)
LYMPHOCYTES # BLD AUTO: 20.2 % — SIGNIFICANT CHANGE UP (ref 13–44)
MCHC RBC-ENTMCNC: 32.1 GM/DL — SIGNIFICANT CHANGE UP (ref 32–36)
MCHC RBC-ENTMCNC: 32.1 PG — SIGNIFICANT CHANGE UP (ref 27–34)
MCV RBC AUTO: 100 FL — SIGNIFICANT CHANGE UP (ref 80–100)
MONOCYTES # BLD AUTO: 1.01 K/UL — HIGH (ref 0–0.9)
MONOCYTES NFR BLD AUTO: 7.3 % — SIGNIFICANT CHANGE UP (ref 2–14)
NEUTROPHILS # BLD AUTO: 9.26 K/UL — HIGH (ref 1.8–7.4)
NEUTROPHILS NFR BLD AUTO: 67 % — SIGNIFICANT CHANGE UP (ref 43–77)
NRBC # BLD: 0 /100 WBCS — SIGNIFICANT CHANGE UP (ref 0–0)
NT-PROBNP SERPL-SCNC: 1534 PG/ML — HIGH (ref 0–125)
PLATELET # BLD AUTO: 326 K/UL — SIGNIFICANT CHANGE UP (ref 150–400)
POTASSIUM SERPL-MCNC: 3.8 MMOL/L — SIGNIFICANT CHANGE UP (ref 3.5–5.3)
POTASSIUM SERPL-SCNC: 3.8 MMOL/L — SIGNIFICANT CHANGE UP (ref 3.5–5.3)
RBC # BLD: 2.8 M/UL — LOW (ref 4.2–5.8)
RBC # FLD: 14.7 % — HIGH (ref 10.3–14.5)
RSV RESULT: SIGNIFICANT CHANGE UP
RSV RNA RESP QL NAA+PROBE: SIGNIFICANT CHANGE UP
SODIUM SERPL-SCNC: 139 MMOL/L — SIGNIFICANT CHANGE UP (ref 135–145)
TROPONIN I SERPL-MCNC: 0.02 NG/ML — SIGNIFICANT CHANGE UP (ref 0–0.04)
TROPONIN I SERPL-MCNC: 0.02 NG/ML — SIGNIFICANT CHANGE UP (ref 0–0.04)
TROPONIN I SERPL-MCNC: 0.03 NG/ML — SIGNIFICANT CHANGE UP (ref 0–0.04)
WBC # BLD: 13.83 K/UL — HIGH (ref 3.8–10.5)
WBC # FLD AUTO: 13.83 K/UL — HIGH (ref 3.8–10.5)

## 2019-03-10 PROCEDURE — 99284 EMERGENCY DEPT VISIT MOD MDM: CPT | Mod: 25

## 2019-03-10 PROCEDURE — 71046 X-RAY EXAM CHEST 2 VIEWS: CPT | Mod: 26

## 2019-03-10 RX ORDER — PANTOPRAZOLE SODIUM 20 MG/1
40 TABLET, DELAYED RELEASE ORAL
Qty: 0 | Refills: 0 | Status: DISCONTINUED | OUTPATIENT
Start: 2019-03-10 | End: 2019-03-13

## 2019-03-10 RX ORDER — INSULIN LISPRO 100/ML
10 VIAL (ML) SUBCUTANEOUS
Qty: 0 | Refills: 0 | Status: DISCONTINUED | OUTPATIENT
Start: 2019-03-10 | End: 2019-03-13

## 2019-03-10 RX ORDER — INSULIN LISPRO 100/ML
VIAL (ML) SUBCUTANEOUS
Qty: 0 | Refills: 0 | Status: DISCONTINUED | OUTPATIENT
Start: 2019-03-10 | End: 2019-03-12

## 2019-03-10 RX ORDER — ACETAMINOPHEN 500 MG
650 TABLET ORAL EVERY 6 HOURS
Qty: 0 | Refills: 0 | Status: DISCONTINUED | OUTPATIENT
Start: 2019-03-10 | End: 2019-03-13

## 2019-03-10 RX ORDER — FUROSEMIDE 40 MG
80 TABLET ORAL
Qty: 0 | Refills: 0 | Status: DISCONTINUED | OUTPATIENT
Start: 2019-03-10 | End: 2019-03-13

## 2019-03-10 RX ORDER — SIMVASTATIN 20 MG/1
40 TABLET, FILM COATED ORAL AT BEDTIME
Qty: 0 | Refills: 0 | Status: DISCONTINUED | OUTPATIENT
Start: 2019-03-10 | End: 2019-03-13

## 2019-03-10 RX ORDER — FUROSEMIDE 40 MG
80 TABLET ORAL ONCE
Qty: 0 | Refills: 0 | Status: COMPLETED | OUTPATIENT
Start: 2019-03-10 | End: 2019-03-10

## 2019-03-10 RX ORDER — NITROGLYCERIN 6.5 MG
0.4 CAPSULE, EXTENDED RELEASE ORAL ONCE
Qty: 0 | Refills: 0 | Status: COMPLETED | OUTPATIENT
Start: 2019-03-10 | End: 2019-03-10

## 2019-03-10 RX ORDER — CINACALCET 30 MG/1
30 TABLET, FILM COATED ORAL DAILY
Qty: 0 | Refills: 0 | Status: DISCONTINUED | OUTPATIENT
Start: 2019-03-10 | End: 2019-03-13

## 2019-03-10 RX ORDER — SEVELAMER CARBONATE 2400 MG/1
400 POWDER, FOR SUSPENSION ORAL EVERY 12 HOURS
Qty: 0 | Refills: 0 | Status: DISCONTINUED | OUTPATIENT
Start: 2019-03-10 | End: 2019-03-10

## 2019-03-10 RX ORDER — TRAMADOL HYDROCHLORIDE 50 MG/1
25 TABLET ORAL
Qty: 0 | Refills: 0 | Status: DISCONTINUED | OUTPATIENT
Start: 2019-03-10 | End: 2019-03-11

## 2019-03-10 RX ORDER — MIRTAZAPINE 45 MG/1
45 TABLET, ORALLY DISINTEGRATING ORAL AT BEDTIME
Qty: 0 | Refills: 0 | Status: DISCONTINUED | OUTPATIENT
Start: 2019-03-10 | End: 2019-03-13

## 2019-03-10 RX ORDER — IPRATROPIUM/ALBUTEROL SULFATE 18-103MCG
3 AEROSOL WITH ADAPTER (GRAM) INHALATION EVERY 6 HOURS
Qty: 0 | Refills: 0 | Status: COMPLETED | OUTPATIENT
Start: 2019-03-10 | End: 2019-03-12

## 2019-03-10 RX ORDER — HYDRALAZINE HCL 50 MG
25 TABLET ORAL ONCE
Qty: 0 | Refills: 0 | Status: COMPLETED | OUTPATIENT
Start: 2019-03-10 | End: 2019-03-10

## 2019-03-10 RX ORDER — GABAPENTIN 400 MG/1
400 CAPSULE ORAL DAILY
Qty: 0 | Refills: 0 | Status: DISCONTINUED | OUTPATIENT
Start: 2019-03-10 | End: 2019-03-13

## 2019-03-10 RX ORDER — QUETIAPINE FUMARATE 200 MG/1
100 TABLET, FILM COATED ORAL DAILY
Qty: 0 | Refills: 0 | Status: DISCONTINUED | OUTPATIENT
Start: 2019-03-10 | End: 2019-03-13

## 2019-03-10 RX ORDER — SEVELAMER CARBONATE 2400 MG/1
800 POWDER, FOR SUSPENSION ORAL
Qty: 0 | Refills: 0 | Status: DISCONTINUED | OUTPATIENT
Start: 2019-03-10 | End: 2019-03-13

## 2019-03-10 RX ORDER — INSULIN GLARGINE 100 [IU]/ML
20 INJECTION, SOLUTION SUBCUTANEOUS AT BEDTIME
Qty: 0 | Refills: 0 | Status: DISCONTINUED | OUTPATIENT
Start: 2019-03-10 | End: 2019-03-13

## 2019-03-10 RX ORDER — OXYCODONE AND ACETAMINOPHEN 5; 325 MG/1; MG/1
1 TABLET ORAL ONCE
Qty: 0 | Refills: 0 | Status: DISCONTINUED | OUTPATIENT
Start: 2019-03-10 | End: 2019-03-10

## 2019-03-10 RX ADMIN — GABAPENTIN 400 MILLIGRAM(S): 400 CAPSULE ORAL at 11:13

## 2019-03-10 RX ADMIN — TRAMADOL HYDROCHLORIDE 25 MILLIGRAM(S): 50 TABLET ORAL at 14:09

## 2019-03-10 RX ADMIN — Medication 80 MILLIGRAM(S): at 17:18

## 2019-03-10 RX ADMIN — Medication 1: at 13:33

## 2019-03-10 RX ADMIN — OXYCODONE AND ACETAMINOPHEN 1 TABLET(S): 5; 325 TABLET ORAL at 07:30

## 2019-03-10 RX ADMIN — Medication 80 MILLIGRAM(S): at 07:30

## 2019-03-10 RX ADMIN — SIMVASTATIN 40 MILLIGRAM(S): 20 TABLET, FILM COATED ORAL at 22:18

## 2019-03-10 RX ADMIN — Medication 25 MILLIGRAM(S): at 17:19

## 2019-03-10 RX ADMIN — Medication 1: at 17:19

## 2019-03-10 RX ADMIN — Medication 0.4 MILLIGRAM(S): at 07:31

## 2019-03-10 RX ADMIN — Medication 3 MILLILITER(S): at 22:17

## 2019-03-10 RX ADMIN — QUETIAPINE FUMARATE 100 MILLIGRAM(S): 200 TABLET, FILM COATED ORAL at 11:14

## 2019-03-10 RX ADMIN — MIRTAZAPINE 45 MILLIGRAM(S): 45 TABLET, ORALLY DISINTEGRATING ORAL at 22:17

## 2019-03-10 RX ADMIN — Medication 3 MILLILITER(S): at 15:34

## 2019-03-10 RX ADMIN — Medication 10 UNIT(S): at 13:35

## 2019-03-10 RX ADMIN — Medication 600 MILLIGRAM(S): at 17:18

## 2019-03-10 RX ADMIN — TRAMADOL HYDROCHLORIDE 25 MILLIGRAM(S): 50 TABLET ORAL at 17:16

## 2019-03-10 RX ADMIN — Medication 650 MILLIGRAM(S): at 17:16

## 2019-03-10 RX ADMIN — Medication 650 MILLIGRAM(S): at 13:35

## 2019-03-10 RX ADMIN — OXYCODONE AND ACETAMINOPHEN 1 TABLET(S): 5; 325 TABLET ORAL at 23:24

## 2019-03-10 RX ADMIN — SEVELAMER CARBONATE 800 MILLIGRAM(S): 2400 POWDER, FOR SUSPENSION ORAL at 18:42

## 2019-03-10 RX ADMIN — OXYCODONE AND ACETAMINOPHEN 1 TABLET(S): 5; 325 TABLET ORAL at 10:19

## 2019-03-10 RX ADMIN — OXYCODONE AND ACETAMINOPHEN 1 TABLET(S): 5; 325 TABLET ORAL at 23:45

## 2019-03-10 RX ADMIN — Medication 40 MILLIGRAM(S): at 14:05

## 2019-03-10 RX ADMIN — Medication 10 UNIT(S): at 17:19

## 2019-03-10 RX ADMIN — INSULIN GLARGINE 20 UNIT(S): 100 INJECTION, SOLUTION SUBCUTANEOUS at 22:17

## 2019-03-10 RX ADMIN — CINACALCET 30 MILLIGRAM(S): 30 TABLET, FILM COATED ORAL at 11:14

## 2019-03-10 NOTE — H&P ADULT - PROBLEM SELECTOR PLAN 5
RISK                                                          Points  [  ] Previous VTE                                                3  [  ] Thrombophilia                                             2  [  ] Lower limb paralysis                                   2        (unable to hold up >15 seconds)    [  ] Current Cancer                                             2         (within 6 months)  [ x ] Immobilization > 24 hrs                              1  [  ] ICU/CCU stay > 24 hours                             1  [ ] Age > 60                                                         1    IMPROVE VTE Score: 1  No VTE prophylaxis.

## 2019-03-10 NOTE — CONSULT NOTE ADULT - SUBJECTIVE AND OBJECTIVE BOX
[facility] Consult Note Nephrology - CONSULTATION NOTE - [phone] - Dr Villalobos / Dr Sanchez / Dr Barber / Dr Mehta / Dr Mauricio / Dr Alexander / Dr Hui / Dr La    Patient is a 38y Male with type 2 DM, ESRD TTS, HTN, Morbidly obese presented to ED c/o worsening shortness of breath and cough x1 month. Associated with PIMENTEL. Denies fever or chills. Mild LE edema noted. Still able to urinate a fair amount. Pt reports he does not like going to HD for additional treatments, as it makes him 'drained.' He reports also being near his EDW. Last dialyzed yesterday, tolerating full treatment with ~4kg UF achieved. Cough is nonproductive, and worse when laying flat. CXR c/w congestion.     PAST MEDICAL & SURGICAL HISTORY:  Migraine  Obesity  ESRD (end stage renal disease)  HTN (hypertension)  DM (diabetes mellitus)  H/O hernia repair    Allergies:  aspirin (Short breath)  penicillin (Short breath)  shellfish (Unknown)    Home Medications Reviewed  Hospital Medications:   MEDICATIONS  (STANDING):  ALBUTerol/ipratropium for Nebulization 3 milliLiter(s) Nebulizer every 6 hours  cinacalcet 30 milliGRAM(s) Oral daily  gabapentin 400 milliGRAM(s) Oral daily  guaiFENesin  milliGRAM(s) Oral every 12 hours  insulin glargine Injectable (LANTUS) 20 Unit(s) SubCutaneous at bedtime  insulin lispro (HumaLOG) corrective regimen sliding scale   SubCutaneous three times a day before meals  insulin lispro Injectable (HumaLOG) 10 Unit(s) SubCutaneous three times a day before meals  methylPREDNISolone sodium succinate Injectable 40 milliGRAM(s) IV Push two times a day  mirtazapine 45 milliGRAM(s) Oral at bedtime  pantoprazole    Tablet 40 milliGRAM(s) Oral before breakfast  QUEtiapine 100 milliGRAM(s) Oral daily  sevelamer hydrochloride 400 milliGRAM(s) Oral every 12 hours  simvastatin 40 milliGRAM(s) Oral at bedtime  traMADol 25 milliGRAM(s) Oral two times a day    SOCIAL HISTORY:  Denies ETOh,Smoking,   FAMILY HISTORY:  Family history of COPD (chronic obstructive pulmonary disease) (Mother)    REVIEW OF SYSTEMS:  CONSTITUTIONAL: No weakness, fevers or chills  EYES/ENT: No visual changes;  No vertigo or throat pain   NECK: No pain or stiffness  RESPIRATORY: +cough, No wheezing, hemoptysis; +shortness of breath  CARDIOVASCULAR: No chest pain or palpitations.  GASTROINTESTINAL: No abdominal or epigastric pain. No nausea, vomiting, or hematemesis; No diarrhea or constipation. No melena or hematochezia.  GENITOURINARY: No dysuria, frequency, foamy urine, urinary urgency, incontinence or hematuria  NEUROLOGICAL: No numbness or weakness  SKIN: No itching, burning, rashes, or lesions   VASCULAR: +bilateral lower extremity edema.   All other review of systems is negative unless indicated above.    VITALS:  T(F): 98.5 (03-10-19 @ 11:50), Max: 99.7 (03-10-19 @ 00:03)  HR: 89 (03-10-19 @ 11:50)  BP: 166/96 (03-10-19 @ 11:50)  RR: 19 (03-10-19 @ 11:50)  SpO2: 95% (03-10-19 @ 11:50)  Wt(kg): --    Height (cm): 200.66 (03-10 @ 00:03)  Weight (kg): 167.8 (03-10 @ 00:03)  BMI (kg/m2): 41.7 (03-10 @ 00:03)  BSA (m2): 2.96 (03-10 @ 00:03)  PHYSICAL EXAM:  Constitutional: NAD  HEENT: anicteric sclera, oropharynx clear, MMM  Neck: No JVD  Respiratory: Diminished breath sounds.   Cardiovascular: S1, S2, RRR  Gastrointestinal: BS+, soft, NT. Distended.   Extremities: No cyanosis or clubbing. 1+ peripheral LE edema  Neurological: A/O x 3, no focal deficits  Psychiatric: Normal mood, normal affect  : No CVA tenderness. No hernandez.   Skin: No rashes  Vascular Access: LUE AV access     LABS:  03-10    139  |  101  |  46<H>  ----------------------------<  109<H>  3.8   |  29  |  7.65<H>    Ca    8.2<L>      10 Mar 2019 05:25      Creatinine Trend: 7.65 <--, 11.80 <--, 10.40 <--, 10.20 <--                        9.0    13.83 )-----------( 326      ( 10 Mar 2019 05:25 )             28.0     Urine Studies:      RADIOLOGY & ADDITIONAL STUDIES:  < from: Xray Chest 2 Views PA/Lat (03.10.19 @ 03:19) >  Impression:    Mild pulmonary vascular congestion    < end of copied text >

## 2019-03-10 NOTE — H&P ADULT - ASSESSMENT
38 year old male from home, lives with family active smoker and marijuana user has PMHX of type 2 DM, Migraine, ESRD (left arm graft, TTS, last dialysis on Saturday)  presented to ED c/o chronic  worsening shortness of breath and cough x1 week

## 2019-03-10 NOTE — ED PROVIDER NOTE - CLINICAL SUMMARY MEDICAL DECISION MAKING FREE TEXT BOX
37 y/o M pt presents with shortness of breath and cough, possible PNA versus CHF exacerbation. Will check labs, CXR, and reassess.

## 2019-03-10 NOTE — CONSULT NOTE ADULT - PROBLEM SELECTOR RECOMMENDATION 2
HD schedule TTS, last dialyzed yesterday,  Electrolytes accpetable, will plan for HD tomorrow to optimize fluid status.   4 hour treatment with 4.5-5 kg UF goal.

## 2019-03-10 NOTE — ED PROVIDER NOTE - PROGRESS NOTE DETAILS
pt reassessed, still shortness of breath.  CXR with vascular congestion.  pt given nitro, lasix in ED.  Will admit for CHF exacerbation.

## 2019-03-10 NOTE — H&P ADULT - NSHPPHYSICALEXAM_GEN_ALL_CORE
Vital Signs Last 24 Hrs  T(C): 36.7 (10 Mar 2019 08:10), Max: 37.6 (10 Mar 2019 00:03)  T(F): 98.1 (10 Mar 2019 08:10), Max: 99.7 (10 Mar 2019 00:03)  HR: 95 (10 Mar 2019 08:10) (95 - 106)  BP: 195/116 (10 Mar 2019 08:10) (152/85 - 195/116)  BP(mean): --  RR: 20 (10 Mar 2019 08:10) (20 - 20)  SpO2: 97% (10 Mar 2019 08:10) (97% - 98%)    PHYSICAL EXAM:  GENERAL: NAD, morbidly obese  HEAD:  Atraumatic, Normocephalic  EYES:  conjunctiva and sclera clear  NECK: Supple, No JVD, Normal thyroid  CHEST/LUNG: Clear to auscultation. Clear to percussion bilaterally; No rales, rhonchi, wheezing, or rubs  HEART: Regular rate and rhythm; No murmurs, rubs, or gallops  ABDOMEN: Soft, Nontender, Nondistended; Bowel sounds present  NERVOUS SYSTEM:  Alert & Oriented X3,    EXTREMITIES:  2+ Peripheral Pulses, No clubbing, cyanosis, or edema  SKIN: warm dry

## 2019-03-10 NOTE — H&P ADULT - PROBLEM SELECTOR PLAN 1
p/w dyspnea with severe cough, with CXR shows pulmonary congestion could be multifactorial in etiology --> KRYSTIN?( broad neck, morbidly obese), pulmonary edema due to ESRD, possible COPD ( active smoker) or pulmonary Htn, CHF exacerbation ( never had stress test)  Last echo showed preserved EF but unable to assess RV pressure  s/p IV lasix  c/w mucinex, duoneb therapy, strict input and output  may need Hemodialysis today due to pulmonary vascular congestion  F/u Flu panel  Monitor BP  Nephro: Dr. La  Cardio Dr. franco

## 2019-03-10 NOTE — ED ADULT NURSE NOTE - CHPI ED NUR SYMPTOMS NEG
no dizziness/no diaphoresis/no back pain/no congestion/no nausea/no chills/no syncope/no vomiting/no shortness of breath

## 2019-03-10 NOTE — H&P ADULT - PMH
DM (diabetes mellitus)    ESRD (end stage renal disease)    HTN (hypertension)    Migraine    Obesity

## 2019-03-10 NOTE — ED PROVIDER NOTE - OBJECTIVE STATEMENT
37 y/o M pt with PMHx of DM and ESRD (on HD Tu, Th, and Sat; last 4.5 hour dialysis today) presents to ED c/o worsening shortness of breath and cough x1 week. Pt thinks he might have PNA. Patient denies fever, chills, or any other complaints.

## 2019-03-10 NOTE — CONSULT NOTE ADULT - PROBLEM SELECTOR RECOMMENDATION 9
Likely mediated by volume overload, due to nonadherence to fluid and dietary restrictions. Additionally, not allowing for extra HD treatments in outpt setting to challenge EDW.  O2 sat ok while on nasal cannula. Will plan for HD tomorrow instead to help optimize fluid status.   Can give lasix 80mg IVP BID in meantime.

## 2019-03-10 NOTE — H&P ADULT - ATTENDING COMMENTS
Agree with all the above   patient was seen and examined in ED together with Dr. Trejo. PGY2.   Presented with SOB, chest pain and cough. Also reports fever of 100.5 last night. Current smoker   Reports that uses his moms CPAP machine at home. Never been tested for sleep apnea.     ROS and PE as above   Vital Signs Last 24 Hrs  T(C): 36.9 (10 Mar 2019 11:50), Max: 37.6 (10 Mar 2019 00:03)  T(F): 98.5 (10 Mar 2019 11:50), Max: 99.7 (10 Mar 2019 00:03)  HR: 89 (10 Mar 2019 11:50) (89 - 106)  BP: 166/96 (10 Mar 2019 11:50) (152/85 - 195/116)  BP(mean): --  RR: 19 (10 Mar 2019 11:50) (19 - 20)  SpO2: 95% (10 Mar 2019 11:50) (95% - 98%)    Assessment and plan as above, except started solumedrol 40 mg IV twice daily for now and called pulmonary consult.   Also, called Dr. La for HD     Plan discussed with patient in detail.

## 2019-03-10 NOTE — H&P ADULT - HISTORY OF PRESENT ILLNESS
38 year old male from home, lives with family has PMHX of type 2 DM, Migraine, ESRD (left arm graft, TTS) presented to ED c/o worsening shortness of breath and cough x1 week. Pt thinks he might have PNA 38 year old male from home, lives with family active smoker and marijuana user has PMHX of type 2 DM, Migraine, ESRD (left arm graft, TTS, last dialysis on Saturday)  presented to ED c/o chronic  worsening shortness of breath and cough x1 week    Patient state since 1 month he has dry cough which is worse on lying down. Cough is so severe that patient is unable to sleep, sometimes he vomits due to excessive coughing. He developed chest pain, headache, diaphoresis and gasping during coughing. He had Temp of 100.4F with chills yesterday. He also complained of dyspnea on exertion and orthopnea since 1 month which is progressively getting worse. He sometimes use his mother's  BiPAP machine at home which relieves his orthopnea but he feels uncomfortable to wear the mask due to high air pressure. He endorsed that he was checked for obstructive sleep apnea 2 years ago in [facility] and was placed on BiPAP machine, then was told he did not have obstructive sleep apnea. He has cats at home. Never went to pulmonologist or never been diagnosed with COPD/ asthma. He reports of being compliant to his dialysis, medications and low salt/carb diet. His last tobacco and marijuana use was 1 week ago as his father has taken cigarettes and weed from him due to worsening cough. 38 year old male from home, lives with family active smoker and marijuana user has PMHX of type 2 DM, Migraine, ESRD (left arm graft, TTS, last dialysis on Saturday), Htn, Morbid obesity  presented to ED c/o chronic  worsening shortness of breath and cough x1 week    Patient state since 1 month he has dry cough which is worse on lying down. Cough is so severe that patient is unable to sleep, sometimes he vomits due to excessive coughing. He developed chest pain, headache, diaphoresis and gasping during coughing. He had Temp of 100.4F with chills yesterday. He also complained of dyspnea on exertion and orthopnea since 1 month which is progressively getting worse. He sometimes use his mother's  BiPAP machine at home which relieves his orthopnea but he feels uncomfortable to wear the mask due to high air pressure. He endorsed that he was checked for obstructive sleep apnea 2 years ago in [facility] and was placed on BiPAP machine, then was told he did not have obstructive sleep apnea. He has cats at home. Never went to pulmonologist or never been diagnosed with COPD/ asthma. He reports of being compliant to his dialysis, medications and low salt/carb diet. His last tobacco and marijuana use was 1 week ago as his father has taken cigarettes and weed from him due to worsening cough. He denies diarrhea, dysuria, constipation, recent travel, chest pain, leg swelling or any other complains. Patient's echocardiogram on last admission showed EF=55%, normal DD, but RVSP was unable to assess. Patient was advised to have outpatient higher capacity nuclear camera stress test but he never made appointment. Patient was intubated only one time for hernia repair.    Goals of care: Full code, Can have central line and IV pressors if needed.    In ED, vital signs remarkable for Pulse of 106, BP:195/116, Labs were remarkable for WBC:13.83, Hb:9.0, Probnp: 1543, CXR: "Mild pulmonary vascular congestion" Patient was given 80IV lasix 38 year old male from home, lives with family active smoker and marijuana user has PMHX of type 2 DM, Migraine, ESRD (left arm graft, TTS, last dialysis on Saturday), Htn, Morbid obesity  presented to ED c/o chronic  worsening shortness of breath and cough x1 week    Patient state since 1 month he has dry cough which is worse on lying down. Cough is so severe that patient is unable to sleep, sometimes he vomits due to excessive coughing. He developed chest pain, headache, diaphoresis and gasping during coughing. He had Temp of 100.4F with chills yesterday. He also complained of dyspnea on exertion and orthopnea since 1 month which is progressively getting worse. He sometimes use his mother's  BiPAP machine at home which relieves his orthopnea but he feels uncomfortable to wear the mask due to high air pressure. He endorsed that he was checked for obstructive sleep apnea 2 years ago in [facility] and was placed on BiPAP machine, then was told he did not have obstructive sleep apnea. He has cats at home. Never went to pulmonologist or never been diagnosed with COPD/ asthma. He reports of being compliant to his dialysis, medications and low salt/carb diet. His last tobacco and marijuana use was 1 week ago as his father has taken cigarettes and weed from him due to worsening cough. He denies diarrhea, dysuria, constipation, recent travel, chest pain, leg swelling or any other complains. Patient's echocardiogram on last admission showed EF=55%, normal DD, but RVSP was unable to assess. Patient was advised to have outpatient higher capacity nuclear camera stress test but he never made appointment. Patient was intubated only one time for hernia repair.    Goals of care: Full code, Can have central line and IV pressors if needed.    In ED, vital signs remarkable for Pulse of 106, BP:195/116, Labs were remarkable for WBC:13.83, Hb:9.0, Probnp: 1543, CXR: "Mild pulmonary vascular congestion" EKG shows NSR, No ST-T wave inversion. Patient was given 80IV lasix

## 2019-03-10 NOTE — ED ADULT NURSE NOTE - NSIMPLEMENTINTERV_GEN_ALL_ED
Implemented All Universal Safety Interventions:  [place] to call system. Call bell, personal items and telephone within reach. Instruct patient to call for assistance. Room bathroom lighting operational. Non-slip footwear when patient is off stretcher. Physically safe environment: no spills, clutter or unnecessary equipment. Stretcher in lowest position, wheels locked, appropriate side rails in place.

## 2019-03-11 LAB
ALBUMIN SERPL ELPH-MCNC: 3 G/DL — LOW (ref 3.5–5)
ALP SERPL-CCNC: 87 U/L — SIGNIFICANT CHANGE UP (ref 40–120)
ALT FLD-CCNC: 20 U/L DA — SIGNIFICANT CHANGE UP (ref 10–60)
ANION GAP SERPL CALC-SCNC: 9 MMOL/L — SIGNIFICANT CHANGE UP (ref 5–17)
AST SERPL-CCNC: 8 U/L — LOW (ref 10–40)
BASOPHILS # BLD AUTO: 0.04 K/UL — SIGNIFICANT CHANGE UP (ref 0–0.2)
BASOPHILS NFR BLD AUTO: 0.2 % — SIGNIFICANT CHANGE UP (ref 0–2)
BILIRUB SERPL-MCNC: 0.3 MG/DL — SIGNIFICANT CHANGE UP (ref 0.2–1.2)
BUN SERPL-MCNC: 71 MG/DL — HIGH (ref 7–18)
CALCIUM SERPL-MCNC: 8.8 MG/DL — SIGNIFICANT CHANGE UP (ref 8.4–10.5)
CHLORIDE SERPL-SCNC: 95 MMOL/L — LOW (ref 96–108)
CO2 SERPL-SCNC: 28 MMOL/L — SIGNIFICANT CHANGE UP (ref 22–31)
CREAT SERPL-MCNC: 9.97 MG/DL — HIGH (ref 0.5–1.3)
EOSINOPHIL # BLD AUTO: 0.01 K/UL — SIGNIFICANT CHANGE UP (ref 0–0.5)
EOSINOPHIL NFR BLD AUTO: 0.1 % — SIGNIFICANT CHANGE UP (ref 0–6)
GLUCOSE SERPL-MCNC: 232 MG/DL — HIGH (ref 70–99)
HCT VFR BLD CALC: 30.1 % — LOW (ref 39–50)
HGB BLD-MCNC: 9.5 G/DL — LOW (ref 13–17)
IMM GRANULOCYTES NFR BLD AUTO: 0.5 % — SIGNIFICANT CHANGE UP (ref 0–1.5)
LYMPHOCYTES # BLD AUTO: 1.23 K/UL — SIGNIFICANT CHANGE UP (ref 1–3.3)
LYMPHOCYTES # BLD AUTO: 7 % — LOW (ref 13–44)
MAGNESIUM SERPL-MCNC: 2.4 MG/DL — SIGNIFICANT CHANGE UP (ref 1.6–2.6)
MCHC RBC-ENTMCNC: 31.6 GM/DL — LOW (ref 32–36)
MCHC RBC-ENTMCNC: 32 PG — SIGNIFICANT CHANGE UP (ref 27–34)
MCV RBC AUTO: 101.3 FL — HIGH (ref 80–100)
MONOCYTES # BLD AUTO: 1.15 K/UL — HIGH (ref 0–0.9)
MONOCYTES NFR BLD AUTO: 6.5 % — SIGNIFICANT CHANGE UP (ref 2–14)
NEUTROPHILS # BLD AUTO: 15.08 K/UL — HIGH (ref 1.8–7.4)
NEUTROPHILS NFR BLD AUTO: 85.7 % — HIGH (ref 43–77)
NRBC # BLD: 0 /100 WBCS — SIGNIFICANT CHANGE UP (ref 0–0)
PHOSPHATE SERPL-MCNC: 7.6 MG/DL — HIGH (ref 2.5–4.5)
PLATELET # BLD AUTO: 364 K/UL — SIGNIFICANT CHANGE UP (ref 150–400)
POTASSIUM SERPL-MCNC: 6.4 MMOL/L — CRITICAL HIGH (ref 3.5–5.3)
POTASSIUM SERPL-SCNC: 6.4 MMOL/L — CRITICAL HIGH (ref 3.5–5.3)
PROT SERPL-MCNC: 8.1 G/DL — SIGNIFICANT CHANGE UP (ref 6–8.3)
RBC # BLD: 2.97 M/UL — LOW (ref 4.2–5.8)
RBC # FLD: 14.9 % — HIGH (ref 10.3–14.5)
SODIUM SERPL-SCNC: 132 MMOL/L — LOW (ref 135–145)
WBC # BLD: 17.6 K/UL — HIGH (ref 3.8–10.5)
WBC # FLD AUTO: 17.6 K/UL — HIGH (ref 3.8–10.5)

## 2019-03-11 RX ORDER — ERYTHROPOIETIN 10000 [IU]/ML
4000 INJECTION, SOLUTION INTRAVENOUS; SUBCUTANEOUS
Qty: 0 | Refills: 0 | Status: DISCONTINUED | OUTPATIENT
Start: 2019-03-11 | End: 2019-03-13

## 2019-03-11 RX ORDER — HYDROMORPHONE HYDROCHLORIDE 2 MG/ML
2 INJECTION INTRAMUSCULAR; INTRAVENOUS; SUBCUTANEOUS ONCE
Qty: 0 | Refills: 0 | Status: DISCONTINUED | OUTPATIENT
Start: 2019-03-11 | End: 2019-03-11

## 2019-03-11 RX ADMIN — Medication 80 MILLIGRAM(S): at 17:26

## 2019-03-11 RX ADMIN — ERYTHROPOIETIN 4000 UNIT(S): 10000 INJECTION, SOLUTION INTRAVENOUS; SUBCUTANEOUS at 10:44

## 2019-03-11 RX ADMIN — SEVELAMER CARBONATE 800 MILLIGRAM(S): 2400 POWDER, FOR SUSPENSION ORAL at 13:22

## 2019-03-11 RX ADMIN — TRAMADOL HYDROCHLORIDE 25 MILLIGRAM(S): 50 TABLET ORAL at 07:38

## 2019-03-11 RX ADMIN — Medication 600 MILLIGRAM(S): at 22:59

## 2019-03-11 RX ADMIN — MIRTAZAPINE 45 MILLIGRAM(S): 45 TABLET, ORALLY DISINTEGRATING ORAL at 21:42

## 2019-03-11 RX ADMIN — PANTOPRAZOLE SODIUM 40 MILLIGRAM(S): 20 TABLET, DELAYED RELEASE ORAL at 06:25

## 2019-03-11 RX ADMIN — SIMVASTATIN 40 MILLIGRAM(S): 20 TABLET, FILM COATED ORAL at 21:42

## 2019-03-11 RX ADMIN — Medication 10 UNIT(S): at 13:21

## 2019-03-11 RX ADMIN — Medication 3 MILLILITER(S): at 03:13

## 2019-03-11 RX ADMIN — Medication 10 UNIT(S): at 17:31

## 2019-03-11 RX ADMIN — Medication 40 MILLIGRAM(S): at 06:25

## 2019-03-11 RX ADMIN — TRAMADOL HYDROCHLORIDE 25 MILLIGRAM(S): 50 TABLET ORAL at 06:23

## 2019-03-11 RX ADMIN — HYDROMORPHONE HYDROCHLORIDE 2 MILLIGRAM(S): 2 INJECTION INTRAMUSCULAR; INTRAVENOUS; SUBCUTANEOUS at 18:19

## 2019-03-11 RX ADMIN — INSULIN GLARGINE 20 UNIT(S): 100 INJECTION, SOLUTION SUBCUTANEOUS at 21:42

## 2019-03-11 RX ADMIN — Medication 600 MILLIGRAM(S): at 17:25

## 2019-03-11 RX ADMIN — Medication 3 MILLILITER(S): at 14:53

## 2019-03-11 RX ADMIN — CINACALCET 30 MILLIGRAM(S): 30 TABLET, FILM COATED ORAL at 13:22

## 2019-03-11 RX ADMIN — Medication 1: at 17:32

## 2019-03-11 RX ADMIN — GABAPENTIN 400 MILLIGRAM(S): 400 CAPSULE ORAL at 13:22

## 2019-03-11 RX ADMIN — QUETIAPINE FUMARATE 100 MILLIGRAM(S): 200 TABLET, FILM COATED ORAL at 13:22

## 2019-03-11 RX ADMIN — HYDROMORPHONE HYDROCHLORIDE 2 MILLIGRAM(S): 2 INJECTION INTRAMUSCULAR; INTRAVENOUS; SUBCUTANEOUS at 19:05

## 2019-03-11 RX ADMIN — Medication 80 MILLIGRAM(S): at 06:25

## 2019-03-11 RX ADMIN — Medication 40 MILLIGRAM(S): at 17:26

## 2019-03-11 RX ADMIN — Medication 600 MILLIGRAM(S): at 06:25

## 2019-03-11 RX ADMIN — Medication 650 MILLIGRAM(S): at 07:38

## 2019-03-11 RX ADMIN — Medication 3 MILLILITER(S): at 20:49

## 2019-03-11 RX ADMIN — Medication 650 MILLIGRAM(S): at 06:25

## 2019-03-11 NOTE — PROGRESS NOTE ADULT - PROBLEM SELECTOR PLAN 1
p/w dyspnea with severe cough, with CXR shows pulmonary congestion   likely volume overload due to ESRD  Last echo showed preserved EF but unable to assess RV pressure  on IV lasix 80 BID and solumedrol 40 BID  c/w mucinex, duoneb therapy, strict input and output  negative Flu panel  Monitor BP  Nephro: Dr. La  Cardio Dr. franco likely volume overload due to ESRD  p/w dyspnea with severe cough, with CXR shows pulmonary congestion   Last echo showed preserved EF but unable to assess RV pressure  on IV lasix 80 BID and solumedrol 40 BID  c/w mucinex, duoneb therapy, strict input and output  negative Flu panel  Monitor BP  Nephro: Dr. La  Cardio Dr. Parks

## 2019-03-11 NOTE — PROGRESS NOTE ADULT - SUBJECTIVE AND OBJECTIVE BOX
PGY 1 Note discussed with supervising resident and primary attending    Patient is a 38y old  Male who presents with a chief complaint of Cough and shortness of breath x 1 month (10 Mar 2019 15:25)      INTERVAL HPI/ OVERNIGHT EVENTS: transferred to floor overnight, says he feels the same and was being transferred for dialysis.    MEDICATIONS  (STANDING):  ALBUTerol/ipratropium for Nebulization 3 milliLiter(s) Nebulizer every 6 hours  cinacalcet 30 milliGRAM(s) Oral daily  epoetin cyndie Injectable 4000 Unit(s) IV Push <User Schedule>  furosemide   Injectable 80 milliGRAM(s) IV Push two times a day  gabapentin 400 milliGRAM(s) Oral daily  guaiFENesin  milliGRAM(s) Oral every 12 hours  insulin glargine Injectable (LANTUS) 20 Unit(s) SubCutaneous at bedtime  insulin lispro (HumaLOG) corrective regimen sliding scale   SubCutaneous three times a day before meals  insulin lispro Injectable (HumaLOG) 10 Unit(s) SubCutaneous three times a day before meals  methylPREDNISolone sodium succinate Injectable 40 milliGRAM(s) IV Push two times a day  mirtazapine 45 milliGRAM(s) Oral at bedtime  pantoprazole    Tablet 40 milliGRAM(s) Oral before breakfast  QUEtiapine 100 milliGRAM(s) Oral daily  sevelamer hydrochloride 800 milliGRAM(s) Oral <User Schedule>  simvastatin 40 milliGRAM(s) Oral at bedtime    MEDICATIONS  (PRN):  acetaminophen   Tablet .. 650 milliGRAM(s) Oral every 6 hours PRN Mild Pain (1 - 3), Moderate Pain (4 - 6), Severe Pain (7 - 10)      __________________________________________________  REVIEW OF SYSTEMS:    CONSTITUTIONAL: No fever,   EYES: no acute visual disturbances  NECK: No pain or stiffness  RESPIRATORY: No cough; No shortness of breath  CARDIOVASCULAR: No chest pain, no palpitations  GASTROINTESTINAL: No pain. No nausea or vomiting; No diarrhea   NEUROLOGICAL: No headache or numbness, no tremors  MUSCULOSKELETAL: No joint pain, no muscle pain  GENITOURINARY: no dysuria, no frequency, no hesitancy  PSYCHIATRY: no depression , no anxiety  ALL OTHER  ROS negative        Vital Signs Last 24 Hrs  T(C): 36.6 (11 Mar 2019 07:35), Max: 36.9 (10 Mar 2019 11:50)  T(F): 97.8 (11 Mar 2019 07:35), Max: 98.5 (10 Mar 2019 11:50)  HR: 91 (11 Mar 2019 07:35) (89 - 101)  BP: 147/98 (11 Mar 2019 07:35) (133/69 - 189/104)  BP(mean): --  RR: 18 (11 Mar 2019 07:35) (18 - 19)  SpO2: 98% (11 Mar 2019 07:35) (95% - 99%)    ________________________________________________  PHYSICAL EXAM:  GENERAL: NAD  HEENT: Normocephalic;  conjunctivae and sclerae clear; moist mucous membranes;   NECK : supple  CHEST/LUNG: Clear to auscultation bilaterally with good air entry   HEART: S1 S2  regular; no murmurs, gallops or rubs  ABDOMEN: Soft, Nontender, Nondistended; Bowel sounds present  EXTREMITIES: no cyanosis; no edema; no calf tenderness  SKIN: warm and dry; no rash  NERVOUS SYSTEM:  Awake and alert; Oriented  to place, person and time ; no new deficits    _________________________________________________  LABS:                        9.5    17.60 )-----------( 364      ( 11 Mar 2019 07:55 )             30.1     03-11    132<L>  |  95<L>  |  71<H>  ----------------------------<  232<H>  6.4<HH>   |  28  |  9.97<H>    Ca    8.8      11 Mar 2019 07:55  Phos  7.6     03-11  Mg     2.4     03-11    TPro  8.1  /  Alb  3.0<L>  /  TBili  0.3  /  DBili  x   /  AST  8<L>  /  ALT  20  /  AlkPhos  87  03-11        CAPILLARY BLOOD GLUCOSE      POCT Blood Glucose.: 227 mg/dL (11 Mar 2019 07:55)  POCT Blood Glucose.: 270 mg/dL (10 Mar 2019 21:57)  POCT Blood Glucose.: 185 mg/dL (10 Mar 2019 16:37)  POCT Blood Glucose.: 153 mg/dL (10 Mar 2019 16:04)  POCT Blood Glucose.: 198 mg/dL (10 Mar 2019 12:30)        RADIOLOGY & ADDITIONAL TESTS:    Imaging Personally Reviewed:  YES/NO    Consultant(s) Notes Reviewed:   YES/ No    Care Discussed with Consultants :     Plan of care was discussed with patient and /or primary care giver; all questions and concerns were addressed and care was aligned with patient's wishes. PGY 1 Note discussed with supervising resident and primary attending    Patient is a 38y old  Male who presents with a chief complaint of Cough and shortness of breath x 1 month (10 Mar 2019 15:25)      INTERVAL HPI/ OVERNIGHT EVENTS: transferred to floor overnight, says he feels the same and was being transferred for dialysis.    MEDICATIONS  (STANDING):  ALBUTerol/ipratropium for Nebulization 3 milliLiter(s) Nebulizer every 6 hours  cinacalcet 30 milliGRAM(s) Oral daily  epoetin cyndie Injectable 4000 Unit(s) IV Push <User Schedule>  furosemide   Injectable 80 milliGRAM(s) IV Push two times a day  gabapentin 400 milliGRAM(s) Oral daily  guaiFENesin  milliGRAM(s) Oral every 12 hours  insulin glargine Injectable (LANTUS) 20 Unit(s) SubCutaneous at bedtime  insulin lispro (HumaLOG) corrective regimen sliding scale   SubCutaneous three times a day before meals  insulin lispro Injectable (HumaLOG) 10 Unit(s) SubCutaneous three times a day before meals  methylPREDNISolone sodium succinate Injectable 40 milliGRAM(s) IV Push two times a day  mirtazapine 45 milliGRAM(s) Oral at bedtime  pantoprazole    Tablet 40 milliGRAM(s) Oral before breakfast  QUEtiapine 100 milliGRAM(s) Oral daily  sevelamer hydrochloride 800 milliGRAM(s) Oral <User Schedule>  simvastatin 40 milliGRAM(s) Oral at bedtime    MEDICATIONS  (PRN):  acetaminophen   Tablet .. 650 milliGRAM(s) Oral every 6 hours PRN Mild Pain (1 - 3), Moderate Pain (4 - 6), Severe Pain (7 - 10)      __________________________________________________  REVIEW OF SYSTEMS:    CONSTITUTIONAL: No fever,   EYES: no acute visual disturbances  NECK: No pain or stiffness  RESPIRATORY: No cough; No shortness of breath  CARDIOVASCULAR: No chest pain, no palpitations  GASTROINTESTINAL: No pain. No nausea or vomiting; No diarrhea   NEUROLOGICAL: No headache or numbness, no tremors  MUSCULOSKELETAL: No joint pain, no muscle pain  GENITOURINARY: no dysuria, no frequency, no hesitancy  PSYCHIATRY: no depression , no anxiety  ALL OTHER  ROS negative        Vital Signs Last 24 Hrs  T(C): 36.6 (11 Mar 2019 07:35), Max: 36.9 (10 Mar 2019 11:50)  T(F): 97.8 (11 Mar 2019 07:35), Max: 98.5 (10 Mar 2019 11:50)  HR: 91 (11 Mar 2019 07:35) (89 - 101)  BP: 147/98 (11 Mar 2019 07:35) (133/69 - 189/104)  BP(mean): --  RR: 18 (11 Mar 2019 07:35) (18 - 19)  SpO2: 98% (11 Mar 2019 07:35) (95% - 99%)    ________________________________________________  PHYSICAL EXAM:  GENERAL: NAD, obese  HEENT: Normocephalic;  conjunctivae and sclerae clear; moist mucous membranes;   NECK : supple  CHEST/LUNG: Clear to auscultation bilaterally with good air entry   HEART: S1 S2  regular; no murmurs, gallops or rubs  ABDOMEN: Soft, Nontender, Nondistended; Bowel sounds present  EXTREMITIES: no cyanosis; no edema; no calf tenderness  SKIN: warm and dry; no rash  NERVOUS SYSTEM:  Awake and alert; Oriented  to place, person and time ; no new deficits    _________________________________________________  LABS:                        9.5    17.60 )-----------( 364      ( 11 Mar 2019 07:55 )             30.1     03-11    132<L>  |  95<L>  |  71<H>  ----------------------------<  232<H>  6.4<HH>   |  28  |  9.97<H>    Ca    8.8      11 Mar 2019 07:55  Phos  7.6     03-11  Mg     2.4     03-11    TPro  8.1  /  Alb  3.0<L>  /  TBili  0.3  /  DBili  x   /  AST  8<L>  /  ALT  20  /  AlkPhos  87  03-11        CAPILLARY BLOOD GLUCOSE      POCT Blood Glucose.: 227 mg/dL (11 Mar 2019 07:55)  POCT Blood Glucose.: 270 mg/dL (10 Mar 2019 21:57)  POCT Blood Glucose.: 185 mg/dL (10 Mar 2019 16:37)  POCT Blood Glucose.: 153 mg/dL (10 Mar 2019 16:04)  POCT Blood Glucose.: 198 mg/dL (10 Mar 2019 12:30)        RADIOLOGY & ADDITIONAL TESTS:    Imaging Personally Reviewed:   YES    Consultant(s) Notes Reviewed:   YES    Care Discussed with Consultants :  YES    Plan of care was discussed with patient and /or primary care giver; all questions and concerns were addressed and care was aligned with patient's wishes. PGY 1 Note discussed with supervising resident and primary attending    Patient is a 38 year old  Male who presents with a chief complaint of cough and shortness of breath x 1 month (10 Mar 2019 15:25)      INTERVAL HPI/ OVERNIGHT EVENTS: transferred to floor overnight, says he feels the same and was being transferred for dialysis.    MEDICATIONS  (STANDING):  ALBUTerol/ipratropium for Nebulization 3 milliLiter(s) Nebulizer every 6 hours  cinacalcet 30 milliGRAM(s) Oral daily  epoetin cyndie Injectable 4000 Unit(s) IV Push <User Schedule>  furosemide   Injectable 80 milliGRAM(s) IV Push two times a day  gabapentin 400 milliGRAM(s) Oral daily  guaiFENesin  milliGRAM(s) Oral every 12 hours  insulin glargine Injectable (LANTUS) 20 Unit(s) SubCutaneous at bedtime  insulin lispro (HumaLOG) corrective regimen sliding scale   SubCutaneous three times a day before meals  insulin lispro Injectable (HumaLOG) 10 Unit(s) SubCutaneous three times a day before meals  methylPREDNISolone sodium succinate Injectable 40 milliGRAM(s) IV Push two times a day  mirtazapine 45 milliGRAM(s) Oral at bedtime  pantoprazole    Tablet 40 milliGRAM(s) Oral before breakfast  QUEtiapine 100 milliGRAM(s) Oral daily  sevelamer hydrochloride 800 milliGRAM(s) Oral <User Schedule>  simvastatin 40 milliGRAM(s) Oral at bedtime    MEDICATIONS  (PRN):  acetaminophen   Tablet .. 650 milliGRAM(s) Oral every 6 hours PRN Mild Pain (1 - 3), Moderate Pain (4 - 6), Severe Pain (7 - 10)      __________________________________________________  REVIEW OF SYSTEMS:    CONSTITUTIONAL: No fever,   EYES: no acute visual disturbances  NECK: No pain or stiffness  RESPIRATORY: No cough; No shortness of breath  CARDIOVASCULAR: No chest pain, no palpitations  GASTROINTESTINAL: No pain. No nausea or vomiting; No diarrhea   NEUROLOGICAL: No headache or numbness, no tremors  MUSCULOSKELETAL: No joint pain, no muscle pain  GENITOURINARY: no dysuria, no frequency, no hesitancy  PSYCHIATRY: no depression , no anxiety  ALL OTHER  ROS negative        Vital Signs Last 24 Hrs  T(C): 36.6 (11 Mar 2019 07:35), Max: 36.9 (10 Mar 2019 11:50)  T(F): 97.8 (11 Mar 2019 07:35), Max: 98.5 (10 Mar 2019 11:50)  HR: 91 (11 Mar 2019 07:35) (89 - 101)  BP: 147/98 (11 Mar 2019 07:35) (133/69 - 189/104)  RR: 18 (11 Mar 2019 07:35) (18 - 19)  SpO2: 98% (11 Mar 2019 07:35) (95% - 99%)    ________________________________________________  PHYSICAL EXAM:  GENERAL: NAD, morbidly obese  HEENT: Normocephalic;  conjunctivae and sclerae clear; moist mucous membranes;   NECK : supple  CHEST/LUNG: Clear to auscultation bilaterally with good air entry   HEART: S1 S2  regular; no murmurs, gallops or rubs  ABDOMEN: Soft, Nontender, Nondistended; Bowel sounds present  EXTREMITIES: no cyanosis; no edema; no calf tenderness  SKIN: warm and dry; no rash  NERVOUS SYSTEM:  Awake and alert; Oriented  to place, person and time ; no new deficits  Psych: Calm    _________________________________________________  LABS:                        9.5    17.60 )-----------( 364      ( 11 Mar 2019 07:55 )             30.1     03-11    132<L>  |  95<L>  |  71<H>  ----------------------------<  232<H>  6.4<HH>   |  28  |  9.97<H>    Ca    8.8      11 Mar 2019 07:55  Phos  7.6     03-11  Mg     2.4     03-11    TPro  8.1  /  Alb  3.0<L>  /  TBili  0.3  /  DBili  x   /  AST  8<L>  /  ALT  20  /  AlkPhos  87  03-11        CAPILLARY BLOOD GLUCOSE      POCT Blood Glucose.: 227 mg/dL (11 Mar 2019 07:55)  POCT Blood Glucose.: 270 mg/dL (10 Mar 2019 21:57)  POCT Blood Glucose.: 185 mg/dL (10 Mar 2019 16:37)  POCT Blood Glucose.: 153 mg/dL (10 Mar 2019 16:04)  POCT Blood Glucose.: 198 mg/dL (10 Mar 2019 12:30)        RADIOLOGY & ADDITIONAL TESTS:    Imaging Personally Reviewed:   YES    Consultant(s) Notes Reviewed:   YES    Care Discussed with Consultants :  YES    Plan of care was discussed with patient and /or primary care giver; all questions and concerns were addressed and care was aligned with patient's wishes.

## 2019-03-11 NOTE — PROGRESS NOTE ADULT - ATTENDING COMMENTS
Patient seen and examined. Patient's history, vitals, labs, imaging studies reviewed. Discussed with above resident, educated on the diagnosis and management of above medical conditions. Agree with note with edits. In addition, Patient admitted with pulmonary congestion, HD per nephrology, f/u pulm - Dr. Thurston, +morbid obesity - BMI - 41.7, life style modification Plan of care discussed with patient, and agrees, all questions answered.   Kassidy Patel MD  3/11/2019 Patient seen and examined. Patient's history, vitals, labs, imaging studies reviewed. Discussed with above resident, educated on the diagnosis and management of above medical conditions. Agree with note with edits. In addition, Patient admitted with pulmonary congestion, HD per nephrology, f/u pulm - Dr. Thurston, +morbid obesity - BMI - 41.7, life style modification, + Tobacco use disorder - counselled on smoking cessation>10min. Plan of care discussed with patient, and agrees, all questions answered.   Kassidy Patel MD  3/11/2019

## 2019-03-11 NOTE — PROGRESS NOTE ADULT - PROBLEM SELECTOR PLAN 1
fluid overloaded. s/p hemodialysis today. Will arrange for HD tommorrow as well. to be seen by cardio for possible stress test.  was seen by pulmonary during previous hospitalization

## 2019-03-11 NOTE — PROGRESS NOTE ADULT - SUBJECTIVE AND OBJECTIVE BOX
[facility] : Progress Note :: [phone], (office [phone]),   Dr La / Dr Hui / Dr Barber / Dr Villalobos / Dr Hang CASTELLANO / Dr Mehta / Dr Sanchez / Dr Alber dumont  _____________________________________________________________________________________________    seen post HD. SOB better.    aspirin (Short breath)  penicillin (Short breath)  shellfish (Unknown)    Hospital Medications:   MEDICATIONS  (STANDING):  ALBUTerol/ipratropium for Nebulization 3 milliLiter(s) Nebulizer every 6 hours  cinacalcet 30 milliGRAM(s) Oral daily  epoetin cyndie Injectable 4000 Unit(s) IV Push <User Schedule>  furosemide   Injectable 80 milliGRAM(s) IV Push two times a day  gabapentin 400 milliGRAM(s) Oral daily  guaiFENesin  milliGRAM(s) Oral every 12 hours  insulin glargine Injectable (LANTUS) 20 Unit(s) SubCutaneous at bedtime  insulin lispro (HumaLOG) corrective regimen sliding scale   SubCutaneous three times a day before meals  insulin lispro Injectable (HumaLOG) 10 Unit(s) SubCutaneous three times a day before meals  methylPREDNISolone sodium succinate Injectable 40 milliGRAM(s) IV Push daily  mirtazapine 45 milliGRAM(s) Oral at bedtime  pantoprazole    Tablet 40 milliGRAM(s) Oral before breakfast  QUEtiapine 100 milliGRAM(s) Oral daily  sevelamer hydrochloride 800 milliGRAM(s) Oral <User Schedule>  simvastatin 40 milliGRAM(s) Oral at bedtime        VITALS:  T(F): 97.3 (03-11-19 @ 15:37), Max: 98.7 (03-11-19 @ 12:30)  HR: 89 (03-11-19 @ 15:37)  BP: 128/78 (03-11-19 @ 15:37)  RR: 17 (03-11-19 @ 15:37)  SpO2: 97% (03-11-19 @ 15:37)  Wt(kg): --      PHYSICAL EXAM:  Constitutional: NAD  HEENT: anicteric sclera, oropharynx clear.  Neck: No JVD  Respiratory: CTAB, no wheezes, rales or rhonchi  Cardiovascular: S1, S2, RRR  Gastrointestinal: BS+, soft, NT/ND  Extremities:  No peripheral edema  Neurological: A/O x 3, no focal deficits  Psychiatric: Normal mood, normal affects  Vascular Access: LUEAVF with two small aneurysms    LABS:  03-11    132<L>  |  95<L>  |  71<H>  ----------------------------<  232<H>  6.4<HH>   |  28  |  9.97<H>    Ca    8.8      11 Mar 2019 07:55  Phos  7.6     03-11  Mg     2.4     03-11    TPro  8.1  /  Alb  3.0<L>  /  TBili  0.3  /  DBili      /  AST  8<L>  /  ALT  20  /  AlkPhos  87  03-11    Creatinine Trend: 9.97 <--, 7.65 <--, 11.80 <--                        9.5    17.60 )-----------( 364      ( 11 Mar 2019 07:55 )             30.1     Urine Studies:      RADIOLOGY & ADDITIONAL STUDIES:

## 2019-03-12 ENCOUNTER — TRANSCRIPTION ENCOUNTER (OUTPATIENT)
Age: 39
End: 2019-03-12

## 2019-03-12 LAB
ANION GAP SERPL CALC-SCNC: 12 MMOL/L — SIGNIFICANT CHANGE UP (ref 5–17)
BASOPHILS # BLD AUTO: 0.02 K/UL — SIGNIFICANT CHANGE UP (ref 0–0.2)
BASOPHILS NFR BLD AUTO: 0.1 % — SIGNIFICANT CHANGE UP (ref 0–2)
BUN SERPL-MCNC: 82 MG/DL — HIGH (ref 7–18)
C DIPHTHERIAE AB SER-ACNC: 0.4 IU/ML — SIGNIFICANT CHANGE UP
CALCIUM SERPL-MCNC: 7.9 MG/DL — LOW (ref 8.4–10.5)
CHLORIDE SERPL-SCNC: 97 MMOL/L — SIGNIFICANT CHANGE UP (ref 96–108)
CO2 SERPL-SCNC: 24 MMOL/L — SIGNIFICANT CHANGE UP (ref 22–31)
CREAT SERPL-MCNC: 9.17 MG/DL — HIGH (ref 0.5–1.3)
EOSINOPHIL # BLD AUTO: 0 K/UL — SIGNIFICANT CHANGE UP (ref 0–0.5)
EOSINOPHIL NFR BLD AUTO: 0 % — SIGNIFICANT CHANGE UP (ref 0–6)
GLUCOSE SERPL-MCNC: 404 MG/DL — HIGH (ref 70–99)
HCT VFR BLD CALC: 26.8 % — LOW (ref 39–50)
HGB BLD-MCNC: 8.6 G/DL — LOW (ref 13–17)
IMM GRANULOCYTES NFR BLD AUTO: 0.9 % — SIGNIFICANT CHANGE UP (ref 0–1.5)
LYMPHOCYTES # BLD AUTO: 1.06 K/UL — SIGNIFICANT CHANGE UP (ref 1–3.3)
LYMPHOCYTES # BLD AUTO: 5.4 % — LOW (ref 13–44)
MAGNESIUM SERPL-MCNC: 2.2 MG/DL — SIGNIFICANT CHANGE UP (ref 1.6–2.6)
MCHC RBC-ENTMCNC: 32.1 GM/DL — SIGNIFICANT CHANGE UP (ref 32–36)
MCHC RBC-ENTMCNC: 32.2 PG — SIGNIFICANT CHANGE UP (ref 27–34)
MCV RBC AUTO: 100.4 FL — HIGH (ref 80–100)
MONOCYTES # BLD AUTO: 0.57 K/UL — SIGNIFICANT CHANGE UP (ref 0–0.9)
MONOCYTES NFR BLD AUTO: 2.9 % — SIGNIFICANT CHANGE UP (ref 2–14)
NEUTROPHILS # BLD AUTO: 17.64 K/UL — HIGH (ref 1.8–7.4)
NEUTROPHILS NFR BLD AUTO: 90.7 % — HIGH (ref 43–77)
NRBC # BLD: 0 /100 WBCS — SIGNIFICANT CHANGE UP (ref 0–0)
PHOSPHATE SERPL-MCNC: 7.8 MG/DL — HIGH (ref 2.5–4.5)
PLATELET # BLD AUTO: 322 K/UL — SIGNIFICANT CHANGE UP (ref 150–400)
POTASSIUM SERPL-MCNC: 5.3 MMOL/L — SIGNIFICANT CHANGE UP (ref 3.5–5.3)
POTASSIUM SERPL-SCNC: 5.3 MMOL/L — SIGNIFICANT CHANGE UP (ref 3.5–5.3)
RBC # BLD: 2.67 M/UL — LOW (ref 4.2–5.8)
RBC # FLD: 14.9 % — HIGH (ref 10.3–14.5)
SODIUM SERPL-SCNC: 133 MMOL/L — LOW (ref 135–145)
WBC # BLD: 19.46 K/UL — HIGH (ref 3.8–10.5)
WBC # FLD AUTO: 19.46 K/UL — HIGH (ref 3.8–10.5)

## 2019-03-12 PROCEDURE — 99222 1ST HOSP IP/OBS MODERATE 55: CPT

## 2019-03-12 RX ORDER — ACETAMINOPHEN 500 MG
1000 TABLET ORAL ONCE
Qty: 0 | Refills: 0 | Status: COMPLETED | OUTPATIENT
Start: 2019-03-12 | End: 2019-03-12

## 2019-03-12 RX ORDER — HYDRALAZINE HCL 50 MG
10 TABLET ORAL ONCE
Qty: 0 | Refills: 0 | Status: COMPLETED | OUTPATIENT
Start: 2019-03-12 | End: 2019-03-12

## 2019-03-12 RX ORDER — INSULIN LISPRO 100/ML
VIAL (ML) SUBCUTANEOUS
Qty: 0 | Refills: 0 | Status: DISCONTINUED | OUTPATIENT
Start: 2019-03-12 | End: 2019-03-13

## 2019-03-12 RX ADMIN — QUETIAPINE FUMARATE 100 MILLIGRAM(S): 200 TABLET, FILM COATED ORAL at 13:23

## 2019-03-12 RX ADMIN — Medication 80 MILLIGRAM(S): at 18:25

## 2019-03-12 RX ADMIN — MIRTAZAPINE 45 MILLIGRAM(S): 45 TABLET, ORALLY DISINTEGRATING ORAL at 22:04

## 2019-03-12 RX ADMIN — INSULIN GLARGINE 20 UNIT(S): 100 INJECTION, SOLUTION SUBCUTANEOUS at 22:04

## 2019-03-12 RX ADMIN — Medication 4: at 18:51

## 2019-03-12 RX ADMIN — SEVELAMER CARBONATE 800 MILLIGRAM(S): 2400 POWDER, FOR SUSPENSION ORAL at 13:23

## 2019-03-12 RX ADMIN — Medication 20 MILLIGRAM(S): at 18:25

## 2019-03-12 RX ADMIN — Medication 10 UNIT(S): at 13:53

## 2019-03-12 RX ADMIN — CINACALCET 30 MILLIGRAM(S): 30 TABLET, FILM COATED ORAL at 13:24

## 2019-03-12 RX ADMIN — Medication 40 MILLIGRAM(S): at 06:44

## 2019-03-12 RX ADMIN — Medication 4: at 13:52

## 2019-03-12 RX ADMIN — Medication 10 UNIT(S): at 08:17

## 2019-03-12 RX ADMIN — Medication 2: at 08:16

## 2019-03-12 RX ADMIN — Medication 3 MILLILITER(S): at 08:57

## 2019-03-12 RX ADMIN — Medication 600 MILLIGRAM(S): at 18:25

## 2019-03-12 RX ADMIN — Medication 6: at 22:04

## 2019-03-12 RX ADMIN — GABAPENTIN 400 MILLIGRAM(S): 400 CAPSULE ORAL at 13:23

## 2019-03-12 RX ADMIN — Medication 10 UNIT(S): at 18:52

## 2019-03-12 RX ADMIN — SIMVASTATIN 40 MILLIGRAM(S): 20 TABLET, FILM COATED ORAL at 22:04

## 2019-03-12 RX ADMIN — Medication 80 MILLIGRAM(S): at 06:43

## 2019-03-12 NOTE — DISCHARGE NOTE PROVIDER - NSDCCPCAREPLAN_GEN_ALL_CORE_FT
PRINCIPAL DISCHARGE DIAGNOSIS  Problem: Fluid overload  Assessment and Plan of Treatment: You presented with chronic  worsening shortness of breath and cough x1 week, CXR showed pulmonary congestion and Last echo on march showed preserved EF. You were treated with lasix and dialysed on  monday and tuesday. Nephrologist was consulted. You are advised to follow up with  Pulmonolgist Dr. Thurston for PFT and sleep study and Cardiologist Dr. Parks for outpatient stress test as previously planned.      SECONDARY DISCHARGE DIAGNOSES  Problem: HTN (hypertension)  Assessment and Plan of Treatment: Maintain a healthy diet that consist of low sugar, low fat, low sodium diet. Exercise frequently if possible.  Follow up with primary care physician in one week after discharge.    Problem: DM (diabetes mellitus)  Assessment and Plan of Treatment: Continue with your blood sugar medication. HbAIC was 6.4.  You must maintain a healthy diet that consist of low sugar, low fat, low sodium diet. Exercise frequently if possible. Consider repeating your Hemoglobin A1c within 3 months after discharge to monitor your average blood glucose control. Follow up with primary care physician in one week after discharge.    Problem: ESRD on dialysis  Assessment and Plan of Treatment: You were dialysed on  monday and tuesday. Nephrologist Dr. La was consulted.  Continue your dialysis as intructed by your Nephrologist. Dialysis will be reinstated upon your discharge.  Please mantain a diet adequate for you condition  Continue dialysis on tuesday, thursday and saturday. PRINCIPAL DISCHARGE DIAGNOSIS  Problem: Fluid overload  Assessment and Plan of Treatment: You presented with chronic  worsening shortness of breath and cough x1 week, CXR showed pulmonary congestion and Last echo on march showed preserved EF. You were treated with lasix and dialysed on  monday and tuesday. Nephrologist was consulted. You are advised to follow up with  Pulmonolgist Dr. Thurston for PFT and sleep study and Cardiologist Dr. Parks for outpatient stress test as previously planned.      SECONDARY DISCHARGE DIAGNOSES  Problem: HTN (hypertension)  Assessment and Plan of Treatment: Maintain a healthy diet that consist of low sugar, low fat, low sodium diet. Exercise frequently if possible.  Follow up with primary care physician in one week after discharge.    Problem: DM (diabetes mellitus)  Assessment and Plan of Treatment: Continue with your blood sugar medication. HbAIC was 6.4.  You must maintain a healthy diet that consist of low sugar, low fat, low sodium diet. Exercise frequently if possible. Consider repeating your Hemoglobin A1c within 3 months after discharge to monitor your average blood glucose control. Follow up with primary care physician in one week after discharge.    Problem: ESRD on dialysis  Assessment and Plan of Treatment: You were dialysed on  monday and tuesday. Nephrologist Dr. La was consulted.  Continue your dialysis as intructed by your Nephrologist. Dialysis will be reinstated upon your discharge.  Please mantain a diet adequate for you condition  Continue dialysis on tuesday, thursday and saturday.    Problem: Morbid obesity  Assessment and Plan of Treatment: BMI 41.6, educated on life style modification    Problem: Tobacco use disorder  Assessment and Plan of Treatment: counselled on smoking cessation      Problem: Marijuana user  Assessment and Plan of Treatment: educated on life style modification

## 2019-03-12 NOTE — PROGRESS NOTE ADULT - ATTENDING COMMENTS
Patient seen and examined. Patient's history, vitals, labs, imaging studies reviewed. Discussed with above resident, educated on the diagnosis and management of above medical conditions. Agree with note with edits. In addition, Patient admitted with pulmonary congestion, HD per nephrology, seen by pulm - Dr. Thurston, and cardiology - Dr. Parks appreciated, +morbid obesity - BMI - 41.7, life style modification, + Tobacco use disorder - counselled on smoking cessation>10min. Plan of care discussed with patient, and agrees, all questions answered.   Kassidy Patel MD  3/12/2019 Patient seen and examined. Patient's history, vitals, labs, imaging studies reviewed. Discussed with above resident, educated on the diagnosis and management of above medical conditions. Agree with note with edits. In addition, Patient admitted with pulmonary congestion, HD per nephrology, seen by pulm - Dr. Thurston, and cardiology - Dr. Parks appreciated; Leukocytosis - likely 2/2 steroids; +morbid obesity - BMI - 41.7, life style modification, + Tobacco use disorder - counselled on smoking cessation>10min. Plan of care discussed with patient, and agrees, all questions answered.   Kassidy Patel MD  3/12/2019

## 2019-03-12 NOTE — PROGRESS NOTE ADULT - SUBJECTIVE AND OBJECTIVE BOX
PGY 1 Note discussed with supervising resident and primary attending    Patient is a 38y old  Male who presents with a chief complaint of Cough and shortness of breath x 1 month (12 Mar 2019 14:51)      INTERVAL HPI/ OVERNIGHT EVENTS: no acute overnight events, complaints of chest pain and wanted to know who is in charge of his pain meds, for dialysis today    MEDICATIONS  (STANDING):  cinacalcet 30 milliGRAM(s) Oral daily  epoetin cyndie Injectable 4000 Unit(s) IV Push <User Schedule>  furosemide   Injectable 80 milliGRAM(s) IV Push two times a day  gabapentin 400 milliGRAM(s) Oral daily  guaiFENesin  milliGRAM(s) Oral every 12 hours  insulin glargine Injectable (LANTUS) 20 Unit(s) SubCutaneous at bedtime  insulin lispro (HumaLOG) corrective regimen sliding scale   SubCutaneous three times a day before meals  insulin lispro Injectable (HumaLOG) 10 Unit(s) SubCutaneous three times a day before meals  methylPREDNISolone sodium succinate Injectable 40 milliGRAM(s) IV Push daily  mirtazapine 45 milliGRAM(s) Oral at bedtime  pantoprazole    Tablet 40 milliGRAM(s) Oral before breakfast  QUEtiapine 100 milliGRAM(s) Oral daily  sevelamer hydrochloride 800 milliGRAM(s) Oral <User Schedule>  simvastatin 40 milliGRAM(s) Oral at bedtime    MEDICATIONS  (PRN):  acetaminophen   Tablet .. 650 milliGRAM(s) Oral every 6 hours PRN Mild Pain (1 - 3), Moderate Pain (4 - 6), Severe Pain (7 - 10)      __________________________________________________  REVIEW OF SYSTEMS:    CONSTITUTIONAL: No fever,   EYES: no acute visual disturbances  NECK: No pain or stiffness  RESPIRATORY: No cough; No shortness of breath  CARDIOVASCULAR: No chest pain, no palpitations  GASTROINTESTINAL: No pain. No nausea or vomiting; No diarrhea   NEUROLOGICAL: No headache or numbness, no tremors  MUSCULOSKELETAL: No joint pain, no muscle pain  GENITOURINARY: no dysuria, no frequency, no hesitancy  PSYCHIATRY: no depression , no anxiety  ALL OTHER  ROS negative        Vital Signs Last 24 Hrs  T(C): 36.6 (12 Mar 2019 11:19), Max: 37 (12 Mar 2019 07:24)  T(F): 97.8 (12 Mar 2019 11:19), Max: 98.6 (12 Mar 2019 07:24)  HR: 93 (12 Mar 2019 11:19) (89 - 94)  BP: 158/100 (12 Mar 2019 11:19) (128/78 - 158/100)  BP(mean): --  RR: 18 (12 Mar 2019 11:19) (17 - 18)  SpO2: 98% (12 Mar 2019 11:19) (97% - 98%)    ________________________________________________  PHYSICAL EXAM:  GENERAL: NAD, morbidly obese  HEENT: Normocephalic;  conjunctivae and sclerae clear; moist mucous membranes;   NECK : supple  CHEST/LUNG: Clear to auscultation bilaterally with good air entry   HEART: S1 S2  regular; no murmurs, gallops or rubs  ABDOMEN: Soft, Nontender, Nondistended; Bowel sounds present  EXTREMITIES: no cyanosis; no edema; no calf tenderness  SKIN: warm and dry; no rash  NERVOUS SYSTEM:  Awake and alert; Oriented  to place, person and time ; no new deficits  Psych: Calm  _________________________________________________  LABS:                        8.6    19.46 )-----------( 322      ( 12 Mar 2019 14:42 )             26.8     03-12    133<L>  |  97  |  82<H>  ----------------------------<  404<H>  5.3   |  24  |  9.17<H>    Ca    7.9<L>      12 Mar 2019 14:42  Phos  7.8     03-12  Mg     2.2     03-12    TPro  8.1  /  Alb  3.0<L>  /  TBili  0.3  /  DBili  x   /  AST  8<L>  /  ALT  20  /  AlkPhos  87  03-11        CAPILLARY BLOOD GLUCOSE      POCT Blood Glucose.: 325 mg/dL (12 Mar 2019 13:24)  POCT Blood Glucose.: 268 mg/dL (12 Mar 2019 11:47)  POCT Blood Glucose.: 229 mg/dL (12 Mar 2019 07:50)  POCT Blood Glucose.: 216 mg/dL (11 Mar 2019 20:56)  POCT Blood Glucose.: 177 mg/dL (11 Mar 2019 16:49)        RADIOLOGY & ADDITIONAL TESTS:    Imaging Personally Reviewed:   YES    Consultant(s) Notes Reviewed:   YES    Care Discussed with Consultants :  YES    Plan of care was discussed with patient and /or primary care giver; all questions and concerns were addressed and care was aligned with patient's wishes. PGY 1 Note discussed with supervising resident and primary attending    Patient is a 38 year old  Male who presents with a chief complaint of Cough and shortness of breath x 1 month (12 Mar 2019 14:51)      INTERVAL HPI/ OVERNIGHT EVENTS: no acute overnight events, complaints of chest pain and wanted to know who is in charge of his pain meds, for dialysis today    MEDICATIONS  (STANDING):  cinacalcet 30 milliGRAM(s) Oral daily  epoetin cyndie Injectable 4000 Unit(s) IV Push <User Schedule>  furosemide   Injectable 80 milliGRAM(s) IV Push two times a day  gabapentin 400 milliGRAM(s) Oral daily  guaiFENesin  milliGRAM(s) Oral every 12 hours  insulin glargine Injectable (LANTUS) 20 Unit(s) SubCutaneous at bedtime  insulin lispro (HumaLOG) corrective regimen sliding scale   SubCutaneous three times a day before meals  insulin lispro Injectable (HumaLOG) 10 Unit(s) SubCutaneous three times a day before meals  methylPREDNISolone sodium succinate Injectable 40 milliGRAM(s) IV Push daily  mirtazapine 45 milliGRAM(s) Oral at bedtime  pantoprazole    Tablet 40 milliGRAM(s) Oral before breakfast  QUEtiapine 100 milliGRAM(s) Oral daily  sevelamer hydrochloride 800 milliGRAM(s) Oral <User Schedule>  simvastatin 40 milliGRAM(s) Oral at bedtime    MEDICATIONS  (PRN):  acetaminophen   Tablet .. 650 milliGRAM(s) Oral every 6 hours PRN Mild Pain (1 - 3), Moderate Pain (4 - 6), Severe Pain (7 - 10)      __________________________________________________  REVIEW OF SYSTEMS:    CONSTITUTIONAL: No fever,   EYES: no acute visual disturbances  NECK: No pain or stiffness  RESPIRATORY: No cough; No shortness of breath  CARDIOVASCULAR: No chest pain, no palpitations  GASTROINTESTINAL: No pain. No nausea or vomiting; No diarrhea   NEUROLOGICAL: No headache or numbness, no tremors  MUSCULOSKELETAL: No joint pain, no muscle pain  GENITOURINARY: no dysuria, no frequency, no hesitancy  PSYCHIATRY: no depression , no anxiety  ALL OTHER  ROS negative        Vital Signs Last 24 Hrs  T(C): 36.6 (12 Mar 2019 11:19), Max: 37 (12 Mar 2019 07:24)  T(F): 97.8 (12 Mar 2019 11:19), Max: 98.6 (12 Mar 2019 07:24)  HR: 93 (12 Mar 2019 11:19) (89 - 94)  BP: 158/100 (12 Mar 2019 11:19) (128/78 - 158/100)  RR: 18 (12 Mar 2019 11:19) (17 - 18)  SpO2: 98% (12 Mar 2019 11:19) (97% - 98%)    ________________________________________________  PHYSICAL EXAM:  GENERAL: NAD, morbidly obese  HEENT: Normocephalic;  conjunctivae and sclerae clear; moist mucous membranes;   NECK : supple  CHEST/LUNG: Clear to auscultation bilaterally with good air entry   HEART: S1 S2  regular; no murmurs, gallops or rubs  ABDOMEN: Soft, Nontender, Nondistended; Bowel sounds present  EXTREMITIES: no cyanosis; no edema; no calf tenderness  SKIN: warm and dry; no rash  NERVOUS SYSTEM:  Awake and alert; Oriented  to place, person and time ; no new deficits  Psych: Calm  _________________________________________________  LABS:                        8.6    19.46 )-----------( 322      ( 12 Mar 2019 14:42 )             26.8     03-12    133<L>  |  97  |  82<H>  ----------------------------<  404<H>  5.3   |  24  |  9.17<H>    Ca    7.9<L>      12 Mar 2019 14:42  Phos  7.8     03-12  Mg     2.2     03-12    TPro  8.1  /  Alb  3.0<L>  /  TBili  0.3  /  DBili  x   /  AST  8<L>  /  ALT  20  /  AlkPhos  87  03-11        CAPILLARY BLOOD GLUCOSE      POCT Blood Glucose.: 325 mg/dL (12 Mar 2019 13:24)  POCT Blood Glucose.: 268 mg/dL (12 Mar 2019 11:47)  POCT Blood Glucose.: 229 mg/dL (12 Mar 2019 07:50)  POCT Blood Glucose.: 216 mg/dL (11 Mar 2019 20:56)  POCT Blood Glucose.: 177 mg/dL (11 Mar 2019 16:49)        RADIOLOGY & ADDITIONAL TESTS:    Imaging Personally Reviewed:   YES    Consultant(s) Notes Reviewed:   YES    Care Discussed with Consultants :  YES    Plan of care was discussed with patient and /or primary care giver; all questions and concerns were addressed and care was aligned with patient's wishes.

## 2019-03-12 NOTE — DISCHARGE NOTE PROVIDER - CARE PROVIDER_API CALL
Eliceo Thurston)  Critical Care Medicine; Internal Medicine; Pulmonary Disease  [address] [place] [address]  Phone: [phone]  Fax: [phone]  Follow Up Time:     Clifford Parks)  Cardiovascular Disease  [address] [facility], [address] [address]  Phone: [phone]  Fax: [phone]  Follow Up Time:

## 2019-03-12 NOTE — PROGRESS NOTE ADULT - SUBJECTIVE AND OBJECTIVE BOX
[facility] : Progress Note :: [phone], (office [phone]),   Dr La / Dr Hui / Dr Barber / Dr Villalobos / Dr Hang CASTELLANO / Dr Mehta / Dr Sanchez / Dr Alber dumont  _____________________________________________________________________________________________   SOB improved after UF     aspirin (Short breath)  penicillin (Short breath)  shellfish (Unknown)    Hospital Medications:   MEDICATIONS  (STANDING):  cinacalcet 30 milliGRAM(s) Oral daily  epoetin cyndie Injectable 4000 Unit(s) IV Push <User Schedule>  furosemide   Injectable 80 milliGRAM(s) IV Push two times a day  gabapentin 400 milliGRAM(s) Oral daily  guaiFENesin  milliGRAM(s) Oral every 12 hours  insulin glargine Injectable (LANTUS) 20 Unit(s) SubCutaneous at bedtime  insulin lispro (HumaLOG) corrective regimen sliding scale   SubCutaneous three times a day before meals  insulin lispro Injectable (HumaLOG) 10 Unit(s) SubCutaneous three times a day before meals  methylPREDNISolone sodium succinate Injectable 40 milliGRAM(s) IV Push daily  mirtazapine 45 milliGRAM(s) Oral at bedtime  pantoprazole    Tablet 40 milliGRAM(s) Oral before breakfast  QUEtiapine 100 milliGRAM(s) Oral daily  sevelamer hydrochloride 800 milliGRAM(s) Oral <User Schedule>  simvastatin 40 milliGRAM(s) Oral at bedtime        VITALS:  T(F): 97.8 (03-12-19 @ 11:19), Max: 98.6 (03-12-19 @ 07:24)  HR: 93 (03-12-19 @ 11:19)  BP: 158/100 (03-12-19 @ 11:19)  RR: 18 (03-12-19 @ 11:19)  SpO2: 98% (03-12-19 @ 11:19)  Wt(kg): --    03-11 @ 07:01  -  03-12 @ 07:00  --------------------------------------------------------  IN: 450 mL / OUT: 1300 mL / NET: -850 mL        PHYSICAL EXAM:  Constitutional: NAD  HEENT: anicteric sclera, oropharynx clear  Neck: No JVD  Respiratory: CTAB, no wheezes, rales or rhonchi  Cardiovascular: S1, S2, RRR  Gastrointestinal: BS+, soft, NT/ND  Extremities:  peripheral edema+  Neurological: A/O x 3, no focal deficits  Psychiatric: Normal mood, normal affect  : No CVA tenderness. No hernandez.   Vascular Access: LUEAV with two aneurysms    LABS:  03-11    132<L>  |  95<L>  |  71<H>  ----------------------------<  232<H>  6.4<HH>   |  28  |  9.97<H>    Ca    8.8      11 Mar 2019 07:55  Phos  7.6     03-11  Mg     2.4     03-11    TPro  8.1  /  Alb  3.0<L>  /  TBili  0.3  /  DBili      /  AST  8<L>  /  ALT  20  /  AlkPhos  87  03-11    Creatinine Trend: 9.97 <--, 7.65 <--                        8.6    19.46 )-----------( 322      ( 12 Mar 2019 14:42 )             26.8     Urine Studies:      RADIOLOGY & ADDITIONAL STUDIES:

## 2019-03-12 NOTE — CONSULT NOTE ADULT - REASON FOR ADMISSION
Cough and shortness of breath x 1 month

## 2019-03-12 NOTE — CONSULT NOTE ADULT - SUBJECTIVE AND OBJECTIVE BOX
Source:    Reason for Consultation:    Chief Complaint:    HPI:          MEDICATIONS  (STANDING):  cinacalcet 30 milliGRAM(s) Oral daily  epoetin cyndie Injectable 4000 Unit(s) IV Push <User Schedule>  furosemide   Injectable 80 milliGRAM(s) IV Push two times a day  gabapentin 400 milliGRAM(s) Oral daily  guaiFENesin  milliGRAM(s) Oral every 12 hours  insulin glargine Injectable (LANTUS) 20 Unit(s) SubCutaneous at bedtime  insulin lispro (HumaLOG) corrective regimen sliding scale   SubCutaneous three times a day before meals  insulin lispro Injectable (HumaLOG) 10 Unit(s) SubCutaneous three times a day before meals  methylPREDNISolone sodium succinate Injectable 40 milliGRAM(s) IV Push daily  mirtazapine 45 milliGRAM(s) Oral at bedtime  pantoprazole    Tablet 40 milliGRAM(s) Oral before breakfast  QUEtiapine 100 milliGRAM(s) Oral daily  sevelamer hydrochloride 800 milliGRAM(s) Oral <User Schedule>  simvastatin 40 milliGRAM(s) Oral at bedtime    MEDICATIONS  (PRN):  acetaminophen   Tablet .. 650 milliGRAM(s) Oral every 6 hours PRN Mild Pain (1 - 3), Moderate Pain (4 - 6), Severe Pain (7 - 10)      Allergies    aspirin (Short breath)  penicillin (Short breath)  shellfish (Unknown)    Intolerances        PAST MEDICAL & SURGICAL HISTORY:  Migraine  Obesity  ESRD (end stage renal disease)  HTN (hypertension)  DM (diabetes mellitus)  H/O hernia repair      FAMILY HISTORY:  Family history of COPD (chronic obstructive pulmonary disease) (Mother)      SOCIAL HISTORY  Smoking History:   Alcohol:  Drugs:  Occupation:    T(C): 37 (03-12-19 @ 07:24), Max: 37.1 (03-11-19 @ 12:30)  HR: 94 (03-12-19 @ 07:24) (88 - 94)  BP: 152/100 (03-12-19 @ 07:24) (128/78 - 156/100)  RR: 18 (03-12-19 @ 07:24) (16 - 18)  SpO2: 98% (03-12-19 @ 07:24) (97% - 100%)    LABS:                        9.5    17.60 )-----------( 364      ( 11 Mar 2019 07:55 )             30.1     03-11    132<L>  |  95<L>  |  71<H>  ----------------------------<  232<H>  6.4<HH>   |  28  |  9.97<H>    Ca    8.8      11 Mar 2019 07:55  Phos  7.6     03-11  Mg     2.4     03-11    TPro  8.1  /  Alb  3.0<L>  /  TBili  0.3  /  DBili  x   /  AST  8<L>  /  ALT  20  /  AlkPhos  87  03-11          CARDIAC MARKERS ( 10 Mar 2019 15:58 )  0.018 ng/mL / x     / 119 U/L / x     / <1.0 ng/mL  CARDIAC MARKERS ( 10 Mar 2019 10:40 )  0.024 ng/mL / x     / 126 U/L / x     / 1.0 ng/mL        Serum Pro-Brain Natriuretic Peptide: 1534 pg/mL (03-10-19 @ 05:25)            Microbiology      RADIOLOGY & ADDITIONAL STUDIES: (My Reading) Source: Patient and chart, well known to me from last admission    Reason for Consultation: Orthopnean and coughing    Chief Complaint: Orthopnea and coughing    HPI:  38 year old male from home, lives with family active smoker and marijuana user has PMHX of type 2 DM, Migraine, ESRD (left arm graft, TTS, last dialysis on Saturday), Htn, Morbid obesity  presented to ED c/o chronic  worsening shortness of breath and cough x1 week    Patient state since 1 month he has dry cough which is worse on lying down. Cough is so severe that patient is unable to sleep, sometimes he vomits due to excessive coughing. He developed chest pain, headache, diaphoresis and gasping during coughing. He had Temp of 100.4F with chills yesterday. He also complained of dyspnea on exertion and orthopnea since 1 month which is progressively getting worse. He sometimes use his mother's  BiPAP machine at home which relieves his orthopnea but he feels uncomfortable to wear the mask due to high air pressure. He endorsed that he was checked for obstructive sleep apnea 2 years ago in [facility] and was placed on BiPAP machine, then was told he did not have obstructive sleep apnea. He has cats at home. Never went to pulmonologist or never been diagnosed with COPD/ asthma. He reports of being compliant to his dialysis, medications and low salt/carb diet. His last tobacco and marijuana use was 1 week ago as his father has taken cigarettes and weed from him due to worsening cough. He denies diarrhea, dysuria, constipation, recent travel, chest pain, leg swelling or any other complains. Patient's echocardiogram on last admission showed EF=55%, normal DD, but RVSP was unable to assess. Patient was advised to have outpatient higher capacity nuclear camera stress test but he never made appointment.   Prior to January, the patient has had no respiratory symptoms      MEDICATIONS  (STANDING):  cinacalcet 30 milliGRAM(s) Oral daily  epoetin cyndie Injectable 4000 Unit(s) IV Push <User Schedule>  furosemide   Injectable 80 milliGRAM(s) IV Push two times a day  gabapentin 400 milliGRAM(s) Oral daily  guaiFENesin  milliGRAM(s) Oral every 12 hours  insulin glargine Injectable (LANTUS) 20 Unit(s) SubCutaneous at bedtime  insulin lispro (HumaLOG) corrective regimen sliding scale   SubCutaneous three times a day before meals  insulin lispro Injectable (HumaLOG) 10 Unit(s) SubCutaneous three times a day before meals  methylPREDNISolone sodium succinate Injectable 40 milliGRAM(s) IV Push daily  mirtazapine 45 milliGRAM(s) Oral at bedtime  pantoprazole    Tablet 40 milliGRAM(s) Oral before breakfast  QUEtiapine 100 milliGRAM(s) Oral daily  sevelamer hydrochloride 800 milliGRAM(s) Oral <User Schedule>  simvastatin 40 milliGRAM(s) Oral at bedtime    MEDICATIONS  (PRN):  acetaminophen   Tablet .. 650 milliGRAM(s) Oral every 6 hours PRN Mild Pain (1 - 3), Moderate Pain (4 - 6), Severe Pain (7 - 10)    Allergies    aspirin (Short breath)  penicillin (Short breath)  shellfish (Unknown)    Intolerances    PAST MEDICAL & SURGICAL HISTORY:  Migraine  Obesity  ESRD (end stage renal disease)  HTN (hypertension)  DM (diabetes mellitus)  H/O hernia repair    FAMILY HISTORY:  Family history of COPD (chronic obstructive pulmonary disease) (Mother)    SOCIAL HISTORY  Smoking History:   Alcohol:  Drugs:  Occupation:    T(C): 37 (03-12-19 @ 07:24), Max: 37.1 (03-11-19 @ 12:30)  HR: 94 (03-12-19 @ 07:24) (88 - 94)  BP: 152/100 (03-12-19 @ 07:24) (128/78 - 156/100)  RR: 18 (03-12-19 @ 07:24) (16 - 18)  SpO2: 98% (03-12-19 @ 07:24) (97% - 100%)    LABS:                        9.5    17.60 )-----------( 364      ( 11 Mar 2019 07:55 )             30.1     03-11    132<L>  |  95<L>  |  71<H>  ----------------------------<  232<H>  6.4<HH>   |  28  |  9.97<H>    Ca    8.8      11 Mar 2019 07:55  Phos  7.6     03-11  Mg     2.4     03-11    TPro  8.1  /  Alb  3.0<L>  /  TBili  0.3  /  DBili  x   /  AST  8<L>  /  ALT  20  /  AlkPhos  87  03-11    CARDIAC MARKERS ( 10 Mar 2019 15:58 )  0.018 ng/mL / x     / 119 U/L / x     / <1.0 ng/mL  CARDIAC MARKERS ( 10 Mar 2019 10:40 )  0.024 ng/mL / x     / 126 U/L / x     / 1.0 ng/mL    Serum Pro-Brain Natriuretic Peptide: 1534 pg/mL (03-10-19 @ 05:25)    Microbiology    RADIOLOGY & ADDITIONAL STUDIES: (My Reading)    PA/LAT. No infiltrates

## 2019-03-12 NOTE — CONSULT NOTE ADULT - SUBJECTIVE AND OBJECTIVE BOX
HISTORY OF PRESENT ILLNESS: HPI:  38 year old male from home, lives with family active smoker and marijuana user has PMHX of type 2 DM, Migraine, ESRD (left arm graft, TTS, last dialysis on Saturday), Htn, Morbid obesity  presented to ED c/o chronic  worsening shortness of breath and cough x1 week    Patient state since 1 month he has dry cough which is worse on lying down. Cough is so severe that patient is unable to sleep, sometimes he vomits due to excessive coughing. He developed chest pain, headache, diaphoresis and gasping during coughing. He had Temp of 100.4F with chills yesterday. He also complained of dyspnea on exertion and orthopnea since 1 month which is progressively getting worse. He sometimes use his mother's  BiPAP machine at home which relieves his orthopnea but he feels uncomfortable to wear the mask due to high air pressure. He endorsed that he was checked for obstructive sleep apnea 2 years ago in [facility] and was placed on BiPAP machine, then was told he did not have obstructive sleep apnea. He has cats at home. Never went to pulmonologist or never been diagnosed with COPD/ asthma. He reports of being compliant to his dialysis, medications and low salt/carb diet. His last tobacco and marijuana use was 1 week ago as his father has taken cigarettes and weed from him due to worsening cough. He denies diarrhea, dysuria, constipation, recent travel, chest pain, leg swelling or any other complains. Patient's echocardiogram on last admission showed EF=55%, normal DD, but RVSP was unable to assess. Patient was advised to have outpatient higher capacity nuclear camera stress test but he never made appointment. Patient was intubated only one time for hernia repair.    Goals of care: Full code, Can have central line and IV pressors if needed.    In ED, vital signs remarkable for Pulse of 106, BP:195/116, Labs were remarkable for WBC:13.83, Hb:9.0, Probnp: 1543, CXR: "Mild pulmonary vascular congestion" EKG shows NSR, No ST-T wave inversion. Patient was given 80IV lasix (10 Mar 2019 07:59)      PAST MEDICAL & SURGICAL HISTORY:  Migraine  Obesity  ESRD (end stage renal disease)  HTN (hypertension)  DM (diabetes mellitus)  H/O hernia repair          MEDICATIONS:  MEDICATIONS  (STANDING):  cinacalcet 30 milliGRAM(s) Oral daily  epoetin cyndie Injectable 4000 Unit(s) IV Push <User Schedule>  furosemide   Injectable 80 milliGRAM(s) IV Push two times a day  gabapentin 400 milliGRAM(s) Oral daily  guaiFENesin  milliGRAM(s) Oral every 12 hours  insulin glargine Injectable (LANTUS) 20 Unit(s) SubCutaneous at bedtime  insulin lispro (HumaLOG) corrective regimen sliding scale   SubCutaneous three times a day before meals  insulin lispro Injectable (HumaLOG) 10 Unit(s) SubCutaneous three times a day before meals  methylPREDNISolone sodium succinate Injectable 40 milliGRAM(s) IV Push daily  mirtazapine 45 milliGRAM(s) Oral at bedtime  pantoprazole    Tablet 40 milliGRAM(s) Oral before breakfast  QUEtiapine 100 milliGRAM(s) Oral daily  sevelamer hydrochloride 800 milliGRAM(s) Oral <User Schedule>  simvastatin 40 milliGRAM(s) Oral at bedtime      Allergies    aspirin (Short breath)  penicillin (Short breath)  shellfish (Unknown)    Intolerances        FAMILY HISTORY:  Family history of COPD (chronic obstructive pulmonary disease) (Mother)    Non-contributary for premature coronary disease or sudden cardiac death    SOCIAL HISTORY:    [ X] Non-smoker  [ ] Smoker  [ ] Alcohol      REVIEW OF SYSTEMS:  [ x]chest pain  [X ]shortness of breath  [  ]palpitations  [  ]syncope  [ ]near syncope [ ]upper extremity weakness   [ ] lower extremity weakness  [  ]diplopia  [  ]altered mental status   [  ]fevers  [ ]chills [ ]nausea  [ ]vomitting  [  ]dysphagia    [ ]abdominal pain  [ ]melena  [ ]BRBPR    [  ]epistaxis  [  ]rash    [ ]lower extremity edema        [x ] All others negative	  [ ] Unable to obtain      LABS:	 	    CARDIAC MARKERS:  CARDIAC MARKERS ( 10 Mar 2019 15:58 )  0.018 ng/mL / x     / 119 U/L / x     / <1.0 ng/mL  CARDIAC MARKERS ( 10 Mar 2019 10:40 )  0.024 ng/mL / x     / 126 U/L / x     / 1.0 ng/mL  CARDIAC MARKERS ( 10 Mar 2019 05:25 )  0.028 ng/mL / x     / 127 U/L / x     / 1.1 ng/mL                              9.5    17.60 )-----------( 364      ( 11 Mar 2019 07:55 )             30.1     Hb Trend:     03-11    132<L>  |  95<L>  |  71<H>  ----------------------------<  232<H>  6.4<HH>   |  28  |  9.97<H>    Ca    8.8      11 Mar 2019 07:55  Phos  7.6     03-11  Mg     2.4     03-11    TPro  8.1  /  Alb  3.0<L>  /  TBili  0.3  /  DBili  x   /  AST  8<L>  /  ALT  20  /  AlkPhos  87  03-11    Creatinine Trend: 9.97<--, 7.65<--, 11.80<--, 10.40<--, 10.20<--, 8.84<--      PHYSICAL EXAM:  T(C): 37 (03-12-19 @ 07:24), Max: 37.1 (03-11-19 @ 12:30)  HR: 94 (03-12-19 @ 07:24) (88 - 94)  BP: 152/100 (03-12-19 @ 07:24) (128/78 - 156/100)  SpO2: 98% (03-12-19 @ 07:24) (97% - 100%)  Wt(kg): --  I&O's Summary    11 Mar 2019 07:01  -  12 Mar 2019 07:00  --------------------------------------------------------  IN: 450 mL / OUT: 1300 mL / NET: -850 mL        Gen: Appears well in NAD  HEENT:  (-)icterus (-)pallor  CV: N S1 S2 1/6 ERNESTINE (+)2 Pulses B/l  Resp:  Clear to ausculatation B/L, normal effort  GI: (+) BS Soft, NT, ND  Lymph:  (-)Edema, (-)obvious lymphadenopathy  Skin: Warm to touch, Normal turgor  Psych: Appropriate mood and affect        TELEMETRY: 	  Sinus     ECG:  	sinus 87 BPM    RADIOLOGY:         CXR:   < from: Xray Chest 2 Views PA/Lat (03.10.19 @ 03:19) >  Mild pulmonary vascular congestion    < end of copied text >      ASSESSMENT/PLAN: 	38y Male Male PMHX of type 2 DM, Migraine, ESRD (left arm graft, TTS) presented to ED with cough and chronic exertional dyspnea.    - He has CP with cough , nonanginal in nature  - No need to repeat echo  - Patient is too heavy for our in house stress camera, plan for outpatient stress test as previously planned  - Can D/C tele no evidence to support ACS    I once again thank you for allowing me to participate in the care of your patient.  If you have any questions or concerns please do not hesitate to contact me.    Clifford Parks MD, FACC  BEEPER [phone]

## 2019-03-12 NOTE — DISCHARGE NOTE PROVIDER - HOSPITAL COURSE
38 year old male from home, lives with family active smoker and marijuana user has PMHX of type 2 DM, Migraine, ESRD (left arm graft, TTS, last dialysis on Saturday)  presented to ED c/o chronic  worsening shortness of breath and cough x1 week, CXR showed pulmonary congestion and Last echo on march showed preserved EF but unable to assess RV pressure. He was on IV lasix 80 BID and was dialysed on  monday and tuesday. Nephro: Dr. La was consulted.    He was also treated with solumedrol and tapered to prednisone PO and is advised to follow up with Pulm Dr. Thurston for outpt. PFT and sleep study.    He complaint of atypical chest pain and Cardio Dr. Parks. advised Patient is too heavy for our in house stress camera, plan for outpatient stress test as previously planned    For DM (diabetes mellitus).  Plan: takes novolog and lantus and Last HBA1C: 6.4.     Patient is stable for discharge per attending and is advised to follow up with PCP as outpatient    Please refer to patient's complete medical chart with documents for a full hospital course, for this is only a brief summary. 38 year old male from home, lives with family active smoker and marijuana user has PMHX of type 2 DM, Migraine, ESRD (left arm graft, TTS, last dialysis on Saturday)  presented to ED c/o chronic  worsening shortness of breath and cough x1 week, CXR showed pulmonary congestion and Last echo on march showed preserved EF but unable to assess RV pressure. He was on IV lasix 80 BID and was dialysed on  monday and tuesday. Nephro: Dr. La was consulted.    He was also treated with solumedrol and tapered to prednisone PO and is advised to follow up with Pulm Dr. Thurston for outpt. PFT and sleep study.    He complaint of atypical chest pain and Cardio DrHernesto Parks. advised Patient is too heavy for our in house stress camera, plan for outpatient stress test as previously planned    For DM (diabetes mellitus), takes novolog and lantus and Last HBA1C: 6.4.     Patient is stable for discharge per attending and is advised to follow up with PCP as outpatient    Please refer to patient's complete medical chart with documents for a full hospital course, for this is only a brief summary.

## 2019-03-12 NOTE — CONSULT NOTE ADULT - ASSESSMENT
38y Male with type 2 DM, ESRD TTS, HTN, Morbidly obese presented to ED c/o worsening shortness of breath and cough.
1) Dyspnea, Orthopnea and nonproductive cough  2) Active tobacco use  3) Morbid obesity BMI- 41  4) ESRD on HD  5) DM    Clinically stable  No signs of pneumonia  No increased work of breathing, lung currently clear  Symptoms are very subacute and of unclear etiology  CXR does not appear to be impressive nor overtly suggestive of fluid overload  Recommend can complete short course of prednisone x 5 days, outpatient PFTs once off prednisone and can be discharged with empiric prn albuterol  Would avoid labeling his dyspnea as asthma or COPD until he can have PFTs to avoid confounding his dyspnea   He should have an outpatient functional test, would recommend a treadmill stress echo given his multiple comorbidities  Repeat outpatient sleep study.  I gave the patient my card to follow up with me in clinic

## 2019-03-13 ENCOUNTER — TRANSCRIPTION ENCOUNTER (OUTPATIENT)
Age: 39
End: 2019-03-13

## 2019-03-13 VITALS
SYSTOLIC BLOOD PRESSURE: 164 MMHG | HEART RATE: 98 BPM | RESPIRATION RATE: 18 BRPM | OXYGEN SATURATION: 98 % | DIASTOLIC BLOOD PRESSURE: 97 MMHG | TEMPERATURE: 97 F

## 2019-03-13 LAB
ANION GAP SERPL CALC-SCNC: 10 MMOL/L — SIGNIFICANT CHANGE UP (ref 5–17)
BASOPHILS # BLD AUTO: 0.02 K/UL — SIGNIFICANT CHANGE UP (ref 0–0.2)
BASOPHILS NFR BLD AUTO: 0.1 % — SIGNIFICANT CHANGE UP (ref 0–2)
BUN SERPL-MCNC: 74 MG/DL — HIGH (ref 7–18)
CALCIUM SERPL-MCNC: 8.1 MG/DL — LOW (ref 8.4–10.5)
CHLORIDE SERPL-SCNC: 102 MMOL/L — SIGNIFICANT CHANGE UP (ref 96–108)
CO2 SERPL-SCNC: 24 MMOL/L — SIGNIFICANT CHANGE UP (ref 22–31)
CREAT SERPL-MCNC: 7.64 MG/DL — HIGH (ref 0.5–1.3)
EOSINOPHIL # BLD AUTO: 0.01 K/UL — SIGNIFICANT CHANGE UP (ref 0–0.5)
EOSINOPHIL NFR BLD AUTO: 0.1 % — SIGNIFICANT CHANGE UP (ref 0–6)
GLUCOSE SERPL-MCNC: 254 MG/DL — HIGH (ref 70–99)
HCT VFR BLD CALC: 31.5 % — LOW (ref 39–50)
HGB BLD-MCNC: 9.6 G/DL — LOW (ref 13–17)
IMM GRANULOCYTES NFR BLD AUTO: 1.9 % — HIGH (ref 0–1.5)
LYMPHOCYTES # BLD AUTO: 1.49 K/UL — SIGNIFICANT CHANGE UP (ref 1–3.3)
LYMPHOCYTES # BLD AUTO: 8.5 % — LOW (ref 13–44)
MAGNESIUM SERPL-MCNC: 2.3 MG/DL — SIGNIFICANT CHANGE UP (ref 1.6–2.6)
MCHC RBC-ENTMCNC: 30.5 GM/DL — LOW (ref 32–36)
MCHC RBC-ENTMCNC: 31.4 PG — SIGNIFICANT CHANGE UP (ref 27–34)
MCV RBC AUTO: 102.9 FL — HIGH (ref 80–100)
MONOCYTES # BLD AUTO: 1.19 K/UL — HIGH (ref 0–0.9)
MONOCYTES NFR BLD AUTO: 6.8 % — SIGNIFICANT CHANGE UP (ref 2–14)
NEUTROPHILS # BLD AUTO: 14.49 K/UL — HIGH (ref 1.8–7.4)
NEUTROPHILS NFR BLD AUTO: 82.6 % — HIGH (ref 43–77)
NRBC # BLD: 0 /100 WBCS — SIGNIFICANT CHANGE UP (ref 0–0)
PHOSPHATE SERPL-MCNC: 8.4 MG/DL — HIGH (ref 2.5–4.5)
PLATELET # BLD AUTO: 353 K/UL — SIGNIFICANT CHANGE UP (ref 150–400)
POTASSIUM SERPL-MCNC: 5.2 MMOL/L — SIGNIFICANT CHANGE UP (ref 3.5–5.3)
POTASSIUM SERPL-SCNC: 5.2 MMOL/L — SIGNIFICANT CHANGE UP (ref 3.5–5.3)
RBC # BLD: 3.06 M/UL — LOW (ref 4.2–5.8)
RBC # FLD: 15 % — HIGH (ref 10.3–14.5)
SODIUM SERPL-SCNC: 136 MMOL/L — SIGNIFICANT CHANGE UP (ref 135–145)
WBC # BLD: 17.53 K/UL — HIGH (ref 3.8–10.5)
WBC # FLD AUTO: 17.53 K/UL — HIGH (ref 3.8–10.5)

## 2019-03-13 PROCEDURE — 99285 EMERGENCY DEPT VISIT HI MDM: CPT | Mod: 25

## 2019-03-13 PROCEDURE — 94640 AIRWAY INHALATION TREATMENT: CPT

## 2019-03-13 PROCEDURE — 84484 ASSAY OF TROPONIN QUANT: CPT

## 2019-03-13 PROCEDURE — 93005 ELECTROCARDIOGRAM TRACING: CPT

## 2019-03-13 PROCEDURE — 80048 BASIC METABOLIC PNL TOTAL CA: CPT

## 2019-03-13 PROCEDURE — 36415 COLL VENOUS BLD VENIPUNCTURE: CPT

## 2019-03-13 PROCEDURE — 83880 ASSAY OF NATRIURETIC PEPTIDE: CPT

## 2019-03-13 PROCEDURE — 86648 DIPHTHERIA ANTIBODY: CPT

## 2019-03-13 PROCEDURE — 80053 COMPREHEN METABOLIC PANEL: CPT

## 2019-03-13 PROCEDURE — 87631 RESP VIRUS 3-5 TARGETS: CPT

## 2019-03-13 PROCEDURE — 71046 X-RAY EXAM CHEST 2 VIEWS: CPT

## 2019-03-13 PROCEDURE — 82553 CREATINE MB FRACTION: CPT

## 2019-03-13 PROCEDURE — 85027 COMPLETE CBC AUTOMATED: CPT

## 2019-03-13 PROCEDURE — 84100 ASSAY OF PHOSPHORUS: CPT

## 2019-03-13 PROCEDURE — 83735 ASSAY OF MAGNESIUM: CPT

## 2019-03-13 PROCEDURE — 82550 ASSAY OF CK (CPK): CPT

## 2019-03-13 PROCEDURE — 99261: CPT

## 2019-03-13 PROCEDURE — 82962 GLUCOSE BLOOD TEST: CPT

## 2019-03-13 RX ORDER — FUROSEMIDE 40 MG
80 TABLET ORAL
Qty: 0 | Refills: 0 | Status: DISCONTINUED | OUTPATIENT
Start: 2019-03-13 | End: 2019-03-13

## 2019-03-13 RX ORDER — FUROSEMIDE 40 MG
1 TABLET ORAL
Qty: 30 | Refills: 0 | OUTPATIENT
Start: 2019-03-13 | End: 2019-03-27

## 2019-03-13 RX ORDER — FUROSEMIDE 40 MG
1 TABLET ORAL
Qty: 0 | Refills: 0 | COMMUNITY
Start: 2019-03-13 | End: 2019-03-27

## 2019-03-13 RX ADMIN — QUETIAPINE FUMARATE 100 MILLIGRAM(S): 200 TABLET, FILM COATED ORAL at 13:01

## 2019-03-13 RX ADMIN — Medication 80 MILLIGRAM(S): at 05:58

## 2019-03-13 RX ADMIN — SIMVASTATIN 40 MILLIGRAM(S): 20 TABLET, FILM COATED ORAL at 21:46

## 2019-03-13 RX ADMIN — Medication 20 MILLIGRAM(S): at 06:26

## 2019-03-13 RX ADMIN — Medication 80 MILLIGRAM(S): at 17:17

## 2019-03-13 RX ADMIN — Medication 10 UNIT(S): at 08:19

## 2019-03-13 RX ADMIN — INSULIN GLARGINE 20 UNIT(S): 100 INJECTION, SOLUTION SUBCUTANEOUS at 21:47

## 2019-03-13 RX ADMIN — ERYTHROPOIETIN 4000 UNIT(S): 10000 INJECTION, SOLUTION INTRAVENOUS; SUBCUTANEOUS at 17:00

## 2019-03-13 RX ADMIN — MIRTAZAPINE 45 MILLIGRAM(S): 45 TABLET, ORALLY DISINTEGRATING ORAL at 21:47

## 2019-03-13 RX ADMIN — Medication 600 MILLIGRAM(S): at 05:58

## 2019-03-13 RX ADMIN — Medication 10 MILLIGRAM(S): at 01:21

## 2019-03-13 RX ADMIN — SEVELAMER CARBONATE 800 MILLIGRAM(S): 2400 POWDER, FOR SUSPENSION ORAL at 13:01

## 2019-03-13 RX ADMIN — PANTOPRAZOLE SODIUM 40 MILLIGRAM(S): 20 TABLET, DELAYED RELEASE ORAL at 06:00

## 2019-03-13 RX ADMIN — CINACALCET 30 MILLIGRAM(S): 30 TABLET, FILM COATED ORAL at 13:01

## 2019-03-13 RX ADMIN — GABAPENTIN 400 MILLIGRAM(S): 400 CAPSULE ORAL at 13:01

## 2019-03-13 RX ADMIN — Medication 600 MILLIGRAM(S): at 17:18

## 2019-03-13 RX ADMIN — Medication 1000 MILLIGRAM(S): at 00:23

## 2019-03-13 RX ADMIN — Medication 2: at 08:18

## 2019-03-13 RX ADMIN — Medication 1: at 17:15

## 2019-03-13 RX ADMIN — Medication 400 MILLIGRAM(S): at 00:04

## 2019-03-13 RX ADMIN — Medication 10 UNIT(S): at 17:16

## 2019-03-13 NOTE — PROGRESS NOTE ADULT - SUBJECTIVE AND OBJECTIVE BOX
Patient denies chest pain or shortness of breath.   Review of systems otherwise (-)  	  MEDICATIONS:  MEDICATIONS  (STANDING):  cinacalcet 30 milliGRAM(s) Oral daily  epoetin cyndie Injectable 4000 Unit(s) IV Push <User Schedule>  furosemide   Injectable 80 milliGRAM(s) IV Push two times a day  gabapentin 400 milliGRAM(s) Oral daily  guaiFENesin  milliGRAM(s) Oral every 12 hours  insulin glargine Injectable (LANTUS) 20 Unit(s) SubCutaneous at bedtime  insulin lispro (HumaLOG) corrective regimen sliding scale   SubCutaneous Before meals and at bedtime  insulin lispro Injectable (HumaLOG) 10 Unit(s) SubCutaneous three times a day before meals  mirtazapine 45 milliGRAM(s) Oral at bedtime  pantoprazole    Tablet 40 milliGRAM(s) Oral before breakfast  predniSONE   Tablet   Oral   predniSONE   Tablet 20 milliGRAM(s) Oral daily  QUEtiapine 100 milliGRAM(s) Oral daily  sevelamer hydrochloride 800 milliGRAM(s) Oral <User Schedule>  simvastatin 40 milliGRAM(s) Oral at bedtime      LABS:	 	    CARDIAC MARKERS:  CARDIAC MARKERS ( 10 Mar 2019 15:58 )  0.018 ng/mL / x     / 119 U/L / x     / <1.0 ng/mL                                9.6    17.53 )-----------( 353      ( 13 Mar 2019 07:41 )             31.5     Hemoglobin: 9.6 g/dL (03-13 @ 07:41)  Hemoglobin: 8.6 g/dL (03-12 @ 14:42)  Hemoglobin: 9.5 g/dL (03-11 @ 07:55)  Hemoglobin: 9.0 g/dL (03-10 @ 05:25)      03-13    136  |  102  |  74<H>  ----------------------------<  254<H>  5.2   |  24  |  7.64<H>    Ca    8.1<L>      13 Mar 2019 07:41  Phos  8.4     03-13  Mg     2.3     03-13      Creatinine Trend: 7.64<--, 9.17<--, 9.97<--, 7.65<--, 11.80<--, 10.40<--      PHYSICAL EXAM:  T(C): 36.7 (03-13-19 @ 08:18), Max: 37.1 (03-12-19 @ 19:43)  HR: 92 (03-13-19 @ 08:18) (84 - 104)  BP: 165/92 (03-13-19 @ 08:18) (149/94 - 188/99)  RR: 18 (03-13-19 @ 08:18) (17 - 18)  SpO2: 99% (03-13-19 @ 08:18) (97% - 100%)  Wt(kg): --  I&O's Summary    12 Mar 2019 07:01  -  13 Mar 2019 07:00  --------------------------------------------------------  IN: 0 mL / OUT: 1400 mL / NET: -1400 mL          Gen: Appears well in NAD  HEENT:  (-)icterus (-)pallor  CV: N S1 S2 1/6 ERNESTINE (+)2 Pulses B/l  Resp:  Clear to ausculatation B/L, normal effort  GI: (+) BS Soft, NT, ND  Lymph:  (-)Edema, (-)obvious lymphadenopathy  Skin: Warm to touch, Normal turgor  Psych: Appropriate mood and affect      TELEMETRY: 	  OFF        ASSESSMENT/PLAN: 	38y  Male PMHX of type 2 DM, Migraine, ESRD (left arm graft, TTS) presented to ED with cough and chronic exertional dyspnea.    - He has CP with cough , nonanginal in nature  - No need to repeat echo  - Patient is too heavy for our in house stress camera, plan for outpatient stress test as previously planned    Clifford Parks MD, [facility]  BEEPER [phone]

## 2019-03-13 NOTE — PROGRESS NOTE ADULT - ASSESSMENT
38 year old male from home, lives with family active smoker and marijuana user has PMHX of type 2 DM, Migraine, ESRD (left arm graft, TTS, last dialysis on Saturday)  presented to ED c/o chronic  worsening shortness of breath and cough x1 week     Problem/Plan - 1:  ·  Problem: Shortness of breath.  Plan: likely volume overload due to ESRD  p/w dyspnea with severe cough, with CXR shows pulmonary congestion   Last echo showed preserved EF but unable to assess RV pressure  on IV lasix 80 BID   will change solumedrol to prednisone PO  c/w mucinex, duoneb therapy, strict input and output  negative Flu panel  Monitor BP  Nephro: Dr. La  Cardio Dr. Parks.advised Patient is too heavy for our in house stress camera, plan for outpatient stress test as previously planned  Pulm Dr. Thurston consulted- advised outpt. PFT and sleep study     Problem/Plan - 2:  ·  Problem: DM (diabetes mellitus).  Plan: takes novolog and lantus  c/w hss and home insulin  Last HBA1C: 6.4.      Problem/Plan - 3:  ·  Problem: ESRD (end stage renal disease).  Plan: c/w HD: TTS  LUE AV access, still urinates  dialysis today  Nephro Dr. La.      Problem/Plan - 4:  ·  Problem: HTN (hypertension).  Plan: BP on higher side  not on medication  on lasix     Problem/Plan - 5:  ·  Problem: Prophylactic measure.  Plan: RISK                                                          Points  [  ] Previous VTE                                                3  [  ] Thrombophilia                                             2  [  ] Lower limb paralysis                                   2        (unable to hold up >15 seconds)    [  ] Current Cancer                                             2         (within 6 months)  [ x ] Immobilization > 24 hrs                              1  [  ] ICU/CCU stay > 24 hours                             1  [ ] Age > 60                                                         1    IMPROVE VTE Score: 1  No VTE prophylaxis.
38y Male with type 2 DM, ESRD TTS, HTN, Morbidly obese presented to ED c/o worsening shortness of breath and cough.
38y Male with type 2 DM, ESRD TTS, HTN, Morbidly obese presented to ED c/o worsening shortness of breath and cough. pulmonary edema from fluid and salt indiscretions.   SOB improved with ultrafiltration  F/U with cardio as an outpatient.  D/C planning
38y Male with type 2 DM, ESRD TTS, HTN, Morbidly obese presented to ED c/o worsening shortness of breath and cough. pulmonary edema from fluid and salt indiscretions.   SOB improved with ultrafiltration.  rpt UF today on HD  cards recommendations noted.   D/C planning
38 year old male from home, lives with family active smoker and marijuana user has PMHX of type 2 DM, Migraine, ESRD (left arm graft, TTS, last dialysis on Saturday)  presented to ED c/o chronic  worsening shortness of breath and cough x1 week

## 2019-03-13 NOTE — PROGRESS NOTE ADULT - REASON FOR ADMISSION
Cough and shortness of breath x 1 month

## 2019-03-13 NOTE — DISCHARGE NOTE NURSING/CASE MANAGEMENT/SOCIAL WORK - NSDCPEPT PROEDHF_GEN_ALL_CORE
Report signs and symptoms to primary care provider/Activities as tolerated/Monitor weight daily/Low salt diet/Call primary care provider for follow up after discharge

## 2019-03-13 NOTE — PROGRESS NOTE ADULT - SUBJECTIVE AND OBJECTIVE BOX
[facility] : Progress Note :: [phone], (office [phone]),   Dr La / Dr Hui / Dr Barber / Dr Villalobos / Dr Hang CASTELLANO / Dr Mehta / Dr Sanchez / Dr Alber dumont  _____________________________________________________________________________________________  s/p HD Mon and TUE. feels much better    aspirin (Short breath)  penicillin (Short breath)  shellfish (Unknown)    Hospital Medications:   MEDICATIONS  (STANDING):  cinacalcet 30 milliGRAM(s) Oral daily  epoetin cyndie Injectable 4000 Unit(s) IV Push <User Schedule>  furosemide    Tablet 80 milliGRAM(s) Oral two times a day  gabapentin 400 milliGRAM(s) Oral daily  guaiFENesin  milliGRAM(s) Oral every 12 hours  insulin glargine Injectable (LANTUS) 20 Unit(s) SubCutaneous at bedtime  insulin lispro (HumaLOG) corrective regimen sliding scale   SubCutaneous Before meals and at bedtime  insulin lispro Injectable (HumaLOG) 10 Unit(s) SubCutaneous three times a day before meals  mirtazapine 45 milliGRAM(s) Oral at bedtime  pantoprazole    Tablet 40 milliGRAM(s) Oral before breakfast  predniSONE   Tablet   Oral   predniSONE   Tablet 20 milliGRAM(s) Oral daily  QUEtiapine 100 milliGRAM(s) Oral daily  sevelamer hydrochloride 800 milliGRAM(s) Oral <User Schedule>  simvastatin 40 milliGRAM(s) Oral at bedtime        VITALS:  T(F): 97.6 (03-13-19 @ 11:19), Max: 98.7 (03-12-19 @ 19:43)  HR: 92 (03-13-19 @ 11:19)  BP: 158/90 (03-13-19 @ 11:19)  RR: 18 (03-13-19 @ 11:19)  SpO2: 99% (03-13-19 @ 11:19)  Wt(kg): --    03-12 @ 07:01  -  03-13 @ 07:00  --------------------------------------------------------  IN: 0 mL / OUT: 1400 mL / NET: -1400 mL        PHYSICAL EXAM:  Constitutional: NAD  HEENT: anicteric sclera, oropharynx clear.  Neck: No JVD  Respiratory: CTAB, no wheezes, rales or rhonchi  Cardiovascular: S1, S2, RRR  Gastrointestinal: BS+, soft, NT/ND  Extremities: LUEAVF with htrill and bruit  peripheral edema  Neurological: A/O x 3, no focal deficits  Psychiatric: Normal mood, normal affect  : No CVA tenderness. No hernandez.       LABS:  03-13    136  |  102  |  74<H>  ----------------------------<  254<H>  5.2   |  24  |  7.64<H>    Ca    8.1<L>      13 Mar 2019 07:41  Phos  8.4     03-13  Mg     2.3     03-13      Creatinine Trend: 7.64 <--, 9.17 <--, 9.97 <--, 7.65 <--                        9.6    17.53 )-----------( 353      ( 13 Mar 2019 07:41 )             31.5     Urine Studies:      RADIOLOGY & ADDITIONAL STUDIES:

## 2019-03-13 NOTE — DISCHARGE NOTE NURSING/CASE MANAGEMENT/SOCIAL WORK - NSDCDPATPORTLINK_GEN_ALL_CORE
You can access the [facility], offered by [facility], by registering with the following website: [email or web address]

## 2019-03-13 NOTE — DISCHARGE NOTE NURSING/CASE MANAGEMENT/SOCIAL WORK - NSDCPNINST_GEN_ALL_CORE
Pt instructed to be more compliant with his medications. maintain a healthy diet. Keep away from sweets and excessive carbs.Pt should also f/u with his PCP within 1 week

## 2019-03-14 RX ORDER — HYDROXYZINE HCL 10 MG
1 TABLET ORAL
Qty: 0 | Refills: 0 | COMMUNITY

## 2019-03-25 ENCOUNTER — INPATIENT (INPATIENT)
Facility: HOSPITAL | Age: 39
LOS: 2 days | Discharge: HOME | DRG: 640 | End: 2019-03-28
Attending: INTERNAL MEDICINE | Admitting: INTERNAL MEDICINE
Payer: MEDICARE

## 2019-03-25 VITALS
OXYGEN SATURATION: 98 % | DIASTOLIC BLOOD PRESSURE: 100 MMHG | RESPIRATION RATE: 18 BRPM | HEIGHT: 77 IN | WEIGHT: 315 LBS | TEMPERATURE: 99 F | HEART RATE: 110 BPM | SYSTOLIC BLOOD PRESSURE: 160 MMHG

## 2019-03-25 DIAGNOSIS — R06.00 DYSPNEA, UNSPECIFIED: ICD-10-CM

## 2019-03-25 DIAGNOSIS — F20.9 SCHIZOPHRENIA, UNSPECIFIED: ICD-10-CM

## 2019-03-25 DIAGNOSIS — Z98.89 OTHER SPECIFIED POSTPROCEDURAL STATES: Chronic | ICD-10-CM

## 2019-03-25 DIAGNOSIS — R25.2 CRAMP AND SPASM: ICD-10-CM

## 2019-03-25 DIAGNOSIS — N18.6 END STAGE RENAL DISEASE: ICD-10-CM

## 2019-03-25 DIAGNOSIS — I10 ESSENTIAL (PRIMARY) HYPERTENSION: ICD-10-CM

## 2019-03-25 DIAGNOSIS — Z98.890 OTHER SPECIFIED POSTPROCEDURAL STATES: Chronic | ICD-10-CM

## 2019-03-25 DIAGNOSIS — E11.9 TYPE 2 DIABETES MELLITUS WITHOUT COMPLICATIONS: ICD-10-CM

## 2019-03-25 DIAGNOSIS — Z29.9 ENCOUNTER FOR PROPHYLACTIC MEASURES, UNSPECIFIED: ICD-10-CM

## 2019-03-25 PROBLEM — G43.909 MIGRAINE, UNSPECIFIED, NOT INTRACTABLE, WITHOUT STATUS MIGRAINOSUS: Chronic | Status: ACTIVE | Noted: 2019-03-10

## 2019-03-25 LAB
ALBUMIN SERPL ELPH-MCNC: 2.6 G/DL — LOW (ref 3.5–5)
ALP SERPL-CCNC: 95 U/L — SIGNIFICANT CHANGE UP (ref 40–120)
ALT FLD-CCNC: 22 U/L DA — SIGNIFICANT CHANGE UP (ref 10–60)
ANION GAP SERPL CALC-SCNC: 11 MMOL/L — SIGNIFICANT CHANGE UP (ref 5–17)
AST SERPL-CCNC: 16 U/L — SIGNIFICANT CHANGE UP (ref 10–40)
BASOPHILS # BLD AUTO: 0.03 K/UL — SIGNIFICANT CHANGE UP (ref 0–0.2)
BASOPHILS NFR BLD AUTO: 0.2 % — SIGNIFICANT CHANGE UP (ref 0–2)
BILIRUB SERPL-MCNC: 0.2 MG/DL — SIGNIFICANT CHANGE UP (ref 0.2–1.2)
BUN SERPL-MCNC: 61 MG/DL — HIGH (ref 7–18)
CALCIUM SERPL-MCNC: 8 MG/DL — LOW (ref 8.4–10.5)
CHLORIDE SERPL-SCNC: 97 MMOL/L — SIGNIFICANT CHANGE UP (ref 96–108)
CO2 SERPL-SCNC: 30 MMOL/L — SIGNIFICANT CHANGE UP (ref 22–31)
CREAT SERPL-MCNC: 9.41 MG/DL — HIGH (ref 0.5–1.3)
EOSINOPHIL # BLD AUTO: 0.47 K/UL — SIGNIFICANT CHANGE UP (ref 0–0.5)
EOSINOPHIL NFR BLD AUTO: 3.7 % — SIGNIFICANT CHANGE UP (ref 0–6)
GLUCOSE SERPL-MCNC: 148 MG/DL — HIGH (ref 70–99)
HCT VFR BLD CALC: 28.3 % — LOW (ref 39–50)
HGB BLD-MCNC: 8.7 G/DL — LOW (ref 13–17)
IMM GRANULOCYTES NFR BLD AUTO: 0.5 % — SIGNIFICANT CHANGE UP (ref 0–1.5)
LYMPHOCYTES # BLD AUTO: 2.58 K/UL — SIGNIFICANT CHANGE UP (ref 1–3.3)
LYMPHOCYTES # BLD AUTO: 20.2 % — SIGNIFICANT CHANGE UP (ref 13–44)
MAGNESIUM SERPL-MCNC: 2.2 MG/DL — SIGNIFICANT CHANGE UP (ref 1.6–2.6)
MCHC RBC-ENTMCNC: 30.7 GM/DL — LOW (ref 32–36)
MCHC RBC-ENTMCNC: 31.5 PG — SIGNIFICANT CHANGE UP (ref 27–34)
MCV RBC AUTO: 102.5 FL — HIGH (ref 80–100)
MONOCYTES # BLD AUTO: 1.08 K/UL — HIGH (ref 0–0.9)
MONOCYTES NFR BLD AUTO: 8.5 % — SIGNIFICANT CHANGE UP (ref 2–14)
NEUTROPHILS # BLD AUTO: 8.53 K/UL — HIGH (ref 1.8–7.4)
NEUTROPHILS NFR BLD AUTO: 66.9 % — SIGNIFICANT CHANGE UP (ref 43–77)
NRBC # BLD: 0 /100 WBCS — SIGNIFICANT CHANGE UP (ref 0–0)
PHOSPHATE SERPL-MCNC: 7.5 MG/DL — HIGH (ref 2.5–4.5)
PLATELET # BLD AUTO: 409 K/UL — HIGH (ref 150–400)
POTASSIUM SERPL-MCNC: 4.6 MMOL/L — SIGNIFICANT CHANGE UP (ref 3.5–5.3)
POTASSIUM SERPL-SCNC: 4.6 MMOL/L — SIGNIFICANT CHANGE UP (ref 3.5–5.3)
PROT SERPL-MCNC: 7.3 G/DL — SIGNIFICANT CHANGE UP (ref 6–8.3)
RBC # BLD: 2.76 M/UL — LOW (ref 4.2–5.8)
RBC # FLD: 15.3 % — HIGH (ref 10.3–14.5)
SODIUM SERPL-SCNC: 138 MMOL/L — SIGNIFICANT CHANGE UP (ref 135–145)
WBC # BLD: 12.75 K/UL — HIGH (ref 3.8–10.5)
WBC # FLD AUTO: 12.75 K/UL — HIGH (ref 3.8–10.5)

## 2019-03-25 PROCEDURE — 99223 1ST HOSP IP/OBS HIGH 75: CPT | Mod: AI,GC

## 2019-03-25 PROCEDURE — 99285 EMERGENCY DEPT VISIT HI MDM: CPT

## 2019-03-25 PROCEDURE — 71045 X-RAY EXAM CHEST 1 VIEW: CPT | Mod: 26

## 2019-03-25 RX ORDER — GABAPENTIN 400 MG/1
400 CAPSULE ORAL THREE TIMES A DAY
Qty: 0 | Refills: 0 | Status: DISCONTINUED | OUTPATIENT
Start: 2019-03-25 | End: 2019-03-26

## 2019-03-25 RX ORDER — MIRTAZAPINE 45 MG/1
1 TABLET, ORALLY DISINTEGRATING ORAL
Qty: 0 | Refills: 0 | COMMUNITY

## 2019-03-25 RX ORDER — DIAZEPAM 5 MG
2 TABLET ORAL ONCE
Qty: 0 | Refills: 0 | Status: DISCONTINUED | OUTPATIENT
Start: 2019-03-25 | End: 2019-03-25

## 2019-03-25 RX ORDER — NICOTINE POLACRILEX 2 MG
1 GUM BUCCAL DAILY
Qty: 0 | Refills: 0 | Status: DISCONTINUED | OUTPATIENT
Start: 2019-03-25 | End: 2019-03-27

## 2019-03-25 RX ORDER — CINACALCET 30 MG/1
1 TABLET, FILM COATED ORAL
Qty: 0 | Refills: 0 | COMMUNITY

## 2019-03-25 RX ORDER — ACETAMINOPHEN 500 MG
1000 TABLET ORAL ONCE
Qty: 0 | Refills: 0 | Status: COMPLETED | OUTPATIENT
Start: 2019-03-25 | End: 2019-03-25

## 2019-03-25 RX ORDER — INSULIN LISPRO 100/ML
VIAL (ML) SUBCUTANEOUS
Qty: 0 | Refills: 0 | Status: DISCONTINUED | OUTPATIENT
Start: 2019-03-25 | End: 2019-03-28

## 2019-03-25 RX ORDER — IPRATROPIUM/ALBUTEROL SULFATE 18-103MCG
3 AEROSOL WITH ADAPTER (GRAM) INHALATION ONCE
Qty: 0 | Refills: 0 | Status: COMPLETED | OUTPATIENT
Start: 2019-03-25 | End: 2019-03-26

## 2019-03-25 RX ORDER — INSULIN GLARGINE 100 [IU]/ML
10 INJECTION, SOLUTION SUBCUTANEOUS AT BEDTIME
Qty: 0 | Refills: 0 | Status: DISCONTINUED | OUTPATIENT
Start: 2019-03-25 | End: 2019-03-28

## 2019-03-25 RX ORDER — QUETIAPINE FUMARATE 200 MG/1
200 TABLET, FILM COATED ORAL DAILY
Qty: 0 | Refills: 0 | Status: DISCONTINUED | OUTPATIENT
Start: 2019-03-25 | End: 2019-03-28

## 2019-03-25 RX ORDER — SIMVASTATIN 20 MG/1
1 TABLET, FILM COATED ORAL
Qty: 0 | Refills: 0 | COMMUNITY

## 2019-03-25 RX ORDER — OMEPRAZOLE 10 MG/1
1 CAPSULE, DELAYED RELEASE ORAL
Qty: 0 | Refills: 0 | COMMUNITY

## 2019-03-25 RX ORDER — POLYETHYLENE GLYCOL 3350 17 G/17G
17 POWDER, FOR SOLUTION ORAL DAILY
Qty: 0 | Refills: 0 | Status: COMPLETED | OUTPATIENT
Start: 2019-03-25 | End: 2019-03-27

## 2019-03-25 RX ORDER — SEVELAMER CARBONATE 2400 MG/1
1 POWDER, FOR SUSPENSION ORAL
Qty: 0 | Refills: 0 | COMMUNITY

## 2019-03-25 RX ORDER — MORPHINE SULFATE 50 MG/1
6 CAPSULE, EXTENDED RELEASE ORAL ONCE
Qty: 0 | Refills: 0 | Status: DISCONTINUED | OUTPATIENT
Start: 2019-03-25 | End: 2019-03-25

## 2019-03-25 RX ORDER — INSULIN ASPART 100 [IU]/ML
5 INJECTION, SOLUTION SUBCUTANEOUS
Qty: 0 | Refills: 0 | COMMUNITY

## 2019-03-25 RX ORDER — SIMVASTATIN 20 MG/1
40 TABLET, FILM COATED ORAL AT BEDTIME
Qty: 0 | Refills: 0 | Status: DISCONTINUED | OUTPATIENT
Start: 2019-03-25 | End: 2019-03-28

## 2019-03-25 RX ORDER — METHOCARBAMOL 500 MG/1
1500 TABLET, FILM COATED ORAL ONCE
Qty: 0 | Refills: 0 | Status: COMPLETED | OUTPATIENT
Start: 2019-03-25 | End: 2019-03-25

## 2019-03-25 RX ORDER — GABAPENTIN 400 MG/1
1 CAPSULE ORAL
Qty: 0 | Refills: 0 | COMMUNITY

## 2019-03-25 RX ORDER — MORPHINE SULFATE 50 MG/1
4 CAPSULE, EXTENDED RELEASE ORAL ONCE
Qty: 0 | Refills: 0 | Status: DISCONTINUED | OUTPATIENT
Start: 2019-03-25 | End: 2019-03-25

## 2019-03-25 RX ORDER — FUROSEMIDE 40 MG
40 TABLET ORAL DAILY
Qty: 0 | Refills: 0 | Status: DISCONTINUED | OUTPATIENT
Start: 2019-03-25 | End: 2019-03-28

## 2019-03-25 RX ORDER — INSULIN GLARGINE 100 [IU]/ML
20 INJECTION, SOLUTION SUBCUTANEOUS
Qty: 0 | Refills: 0 | COMMUNITY

## 2019-03-25 RX ORDER — SODIUM CHLORIDE 9 MG/ML
3 INJECTION INTRAMUSCULAR; INTRAVENOUS; SUBCUTANEOUS ONCE
Qty: 0 | Refills: 0 | Status: COMPLETED | OUTPATIENT
Start: 2019-03-25 | End: 2019-03-25

## 2019-03-25 RX ORDER — VITAMIN E 100 UNIT
400 CAPSULE ORAL AT BEDTIME
Qty: 0 | Refills: 0 | Status: DISCONTINUED | OUTPATIENT
Start: 2019-03-25 | End: 2019-03-28

## 2019-03-25 RX ORDER — HEPARIN SODIUM 5000 [USP'U]/ML
5000 INJECTION INTRAVENOUS; SUBCUTANEOUS EVERY 8 HOURS
Qty: 0 | Refills: 0 | Status: DISCONTINUED | OUTPATIENT
Start: 2019-03-25 | End: 2019-03-28

## 2019-03-25 RX ORDER — ASCORBIC ACID 60 MG
500 TABLET,CHEWABLE ORAL DAILY
Qty: 0 | Refills: 0 | Status: DISCONTINUED | OUTPATIENT
Start: 2019-03-25 | End: 2019-03-28

## 2019-03-25 RX ORDER — AMLODIPINE BESYLATE 2.5 MG/1
5 TABLET ORAL DAILY
Qty: 0 | Refills: 0 | Status: DISCONTINUED | OUTPATIENT
Start: 2019-03-25 | End: 2019-03-28

## 2019-03-25 RX ORDER — RANITIDINE HYDROCHLORIDE 150 MG/1
1 TABLET, FILM COATED ORAL
Qty: 0 | Refills: 0 | COMMUNITY

## 2019-03-25 RX ORDER — QUETIAPINE FUMARATE 200 MG/1
1 TABLET, FILM COATED ORAL
Qty: 0 | Refills: 0 | COMMUNITY

## 2019-03-25 RX ORDER — SODIUM CHLORIDE 9 MG/ML
1000 INJECTION INTRAMUSCULAR; INTRAVENOUS; SUBCUTANEOUS ONCE
Qty: 0 | Refills: 0 | Status: COMPLETED | OUTPATIENT
Start: 2019-03-25 | End: 2019-03-25

## 2019-03-25 RX ORDER — ESCITALOPRAM OXALATE 10 MG/1
1 TABLET, FILM COATED ORAL
Qty: 0 | Refills: 0 | COMMUNITY

## 2019-03-25 RX ORDER — CLOPIDOGREL BISULFATE 75 MG/1
75 TABLET, FILM COATED ORAL DAILY
Qty: 0 | Refills: 0 | Status: DISCONTINUED | OUTPATIENT
Start: 2019-03-25 | End: 2019-03-28

## 2019-03-25 RX ORDER — SODIUM CHLORIDE 9 MG/ML
500 INJECTION INTRAMUSCULAR; INTRAVENOUS; SUBCUTANEOUS ONCE
Qty: 0 | Refills: 0 | Status: COMPLETED | OUTPATIENT
Start: 2019-03-25 | End: 2019-03-25

## 2019-03-25 RX ORDER — CYCLOBENZAPRINE HYDROCHLORIDE 10 MG/1
10 TABLET, FILM COATED ORAL THREE TIMES A DAY
Qty: 0 | Refills: 0 | Status: DISCONTINUED | OUTPATIENT
Start: 2019-03-25 | End: 2019-03-28

## 2019-03-25 RX ADMIN — MORPHINE SULFATE 6 MILLIGRAM(S): 50 CAPSULE, EXTENDED RELEASE ORAL at 07:06

## 2019-03-25 RX ADMIN — POLYETHYLENE GLYCOL 3350 17 GRAM(S): 17 POWDER, FOR SOLUTION ORAL at 12:06

## 2019-03-25 RX ADMIN — Medication 400 MILLIGRAM(S): at 05:44

## 2019-03-25 RX ADMIN — Medication 1000 MILLIGRAM(S): at 06:47

## 2019-03-25 RX ADMIN — SODIUM CHLORIDE 1000 MILLILITER(S): 9 INJECTION INTRAMUSCULAR; INTRAVENOUS; SUBCUTANEOUS at 10:15

## 2019-03-25 RX ADMIN — Medication 40 MILLIGRAM(S): at 17:30

## 2019-03-25 RX ADMIN — METHOCARBAMOL 1500 MILLIGRAM(S): 500 TABLET, FILM COATED ORAL at 07:46

## 2019-03-25 RX ADMIN — HEPARIN SODIUM 5000 UNIT(S): 5000 INJECTION INTRAVENOUS; SUBCUTANEOUS at 21:55

## 2019-03-25 RX ADMIN — SODIUM CHLORIDE 3 MILLILITER(S): 9 INJECTION INTRAMUSCULAR; INTRAVENOUS; SUBCUTANEOUS at 05:44

## 2019-03-25 RX ADMIN — HEPARIN SODIUM 5000 UNIT(S): 5000 INJECTION INTRAVENOUS; SUBCUTANEOUS at 14:21

## 2019-03-25 RX ADMIN — Medication 1 MILLIGRAM(S): at 20:37

## 2019-03-25 RX ADMIN — Medication 500 MILLIGRAM(S): at 12:06

## 2019-03-25 RX ADMIN — SODIUM CHLORIDE 1000 MILLILITER(S): 9 INJECTION INTRAMUSCULAR; INTRAVENOUS; SUBCUTANEOUS at 12:06

## 2019-03-25 RX ADMIN — Medication 1 MILLIGRAM(S): at 05:44

## 2019-03-25 RX ADMIN — MORPHINE SULFATE 6 MILLIGRAM(S): 50 CAPSULE, EXTENDED RELEASE ORAL at 06:58

## 2019-03-25 RX ADMIN — INSULIN GLARGINE 10 UNIT(S): 100 INJECTION, SOLUTION SUBCUTANEOUS at 21:55

## 2019-03-25 RX ADMIN — GABAPENTIN 400 MILLIGRAM(S): 400 CAPSULE ORAL at 21:56

## 2019-03-25 RX ADMIN — Medication 2 MILLIGRAM(S): at 12:06

## 2019-03-25 RX ADMIN — AMLODIPINE BESYLATE 5 MILLIGRAM(S): 2.5 TABLET ORAL at 17:30

## 2019-03-25 RX ADMIN — MORPHINE SULFATE 4 MILLIGRAM(S): 50 CAPSULE, EXTENDED RELEASE ORAL at 10:00

## 2019-03-25 RX ADMIN — CYCLOBENZAPRINE HYDROCHLORIDE 10 MILLIGRAM(S): 10 TABLET, FILM COATED ORAL at 21:55

## 2019-03-25 RX ADMIN — GABAPENTIN 400 MILLIGRAM(S): 400 CAPSULE ORAL at 14:21

## 2019-03-25 RX ADMIN — Medication 2 MILLIGRAM(S): at 14:20

## 2019-03-25 RX ADMIN — MORPHINE SULFATE 4 MILLIGRAM(S): 50 CAPSULE, EXTENDED RELEASE ORAL at 09:42

## 2019-03-25 RX ADMIN — SIMVASTATIN 40 MILLIGRAM(S): 20 TABLET, FILM COATED ORAL at 21:55

## 2019-03-25 RX ADMIN — SODIUM CHLORIDE 2000 MILLILITER(S): 9 INJECTION INTRAMUSCULAR; INTRAVENOUS; SUBCUTANEOUS at 09:44

## 2019-03-25 NOTE — CONSULT NOTE ADULT - ASSESSMENT
38 year old male with cramping of LUE s/p HD on Saturday. LUE AV graft.     - recommend US of LUE AV graft with flow rates within graft and in arteries distal to graft to assess if flow antegrade vs. retrograde  - recommend non invasive JERRY/PVR of distal LUE  - renal consult for HD while in house  - pain control, medical management  - discussed with Dr. Rosario and agrees 38 year old male with cramping of LUE s/p HD on Saturday. LUE AV graft.     - recommend US of LUE AV graft with flow rates within graft and in arteries distal to graft to assess if flow antegrade vs. retrograde  - recommend non invasive JERRY/PVR of distal LUE  - renal consult for HD while in house  - pain control, medical management  - discussed with Dr. Foote and agrees

## 2019-03-25 NOTE — MEDICAL STUDENT ADULT H&P (EDUCATION) - NS MD HP STUD HX OF PRESENT ILLNESS FT
39 y/o male with PMHx of DM, neuropathy, HTN, Bipolar disorder, Schizophrenia, Obesity, ESRD (residual renal function) with Dialysis (T, Th, Sat) with Left UA AV Fistula, BIB EMS to the ED with c/o left arm cramping that awoke him from sleep last night. Describes the cramping pain as 10/10 in intensity, radiating to the entire left side of his body and left leg. Reports feeling cramping during dialysis on Saturday at [facility] ([place]) which was resolved once dialysis was stopped, states he experiences cramping of the left arm whenever large amounts of fluid are removed, (5-6 liters removed Saturday). Pain is usually resolved with Tylenol, but this time pain was "too intense." Denies fever,cough, rash, abdominal pain, nausea, vomiting or diarrhea. Admits to SOB on exertion, orthopnea, constipation (last bm 2 days ago) leg swelling and tingling. 37 y/o male with PMHx of DM, neuropathy, HTN, Bipolar disorder, Schizophrenia, Obesity, ESRD (residual renal function) with Dialysis (T, Th, Sat) with Left UA AV Fistula, BIB EMS to the ED with c/o left arm cramping that awoke him from sleep last night. Describes the cramping pain as 10/10 in intensity, radiating to the entire left side of his body and left leg. Reports feeling cramping during dialysis on Saturday at [facility] ([place]) which was resolved once dialysis was stopped, states he experiences cramping of the left arm whenever large amounts of fluid are removed, (5-6 liters removed Saturday). Pain is usually resolved with Tylenol, but this time pain was "too intense." Denies fever,cough, rash, abdominal pain, nausea, vomiting or diarrhea. Admits to SOB on exertion with occasional  use of supplemental oxygen (mothers RX), orthopnea, constipation (last bm 2 days ago) leg swelling and tingling.     ED course: VS 98.6 F, , /100, RR 18, 98% RA. Ativan, Morphine, and Ofemiv IV given for pain, as well as Robaxin for Left arm spasm. 1.5 Liter bolus given

## 2019-03-25 NOTE — H&P ADULT - PROBLEM SELECTOR PLAN 1
Likely due to over dialysis   Lt upper and lower extremity intermittent cramps  Started Vit. E & Vit. C  Symptoms control with Ativan  Check carnitine and PTH levels  Carnitine supplementation if levels are low Likely due to over dialysis vs. pain seeking nature   Lt upper and lower extremity intermittent cramps  Started Vit. E & Vit. C, Flexeril   Symptoms control with Ativan  Check carnitine and PTH levels  Carnitine supplementation if levels are low

## 2019-03-25 NOTE — ED ADULT NURSE REASSESSMENT NOTE - NS ED NURSE REASSESS COMMENT FT1
Patient refuse EKG due to spasm and uncomfortable pain, will administer Morphine 6mg, reassess, Koki RN notified to reassess patient to obtain EKG.

## 2019-03-25 NOTE — H&P ADULT - PROBLEM SELECTOR PLAN 2
[facility]  Consulted Dr La  Last complete dialysis on Saturday  Still makes urine Goes to [facility] dialysis center every TTS  Consulted Dr La  Last complete dialysis on Saturday  Still makes urine  No need of urgent dialysis

## 2019-03-25 NOTE — H&P ADULT - PROBLEM SELECTOR PLAN 7
Improve VTE score: 2 (heparin sq)  [ ] Previous VTE                                               3  [ ] Thrombophilia                                             2  [x] Lower limb paralysis                                   2    [ ] Current Cancer                                           2   [x] Immobilization > 24 hrs                              1  [ ] ICU/CCU stay > 24 hours                            1  [ ] Age > 60                                                       1

## 2019-03-25 NOTE — MEDICAL STUDENT ADULT H&P (EDUCATION) - NS MD HP STUD PE GI FT
Abdomen soft, obese,  tenderness with deep palpation UR quadrant, no rebound/guarding. Hypoactive bowel sounds

## 2019-03-25 NOTE — ED PROVIDER NOTE - PMH
Bipolar disorder    Diabetes    DM (diabetes mellitus)    ESRD (end stage renal disease)    ESRD on dialysis    HTN (hypertension)    Migraine    Morbid obesity with BMI of 40.0-44.9, adult    Obesity    Schizophrenia

## 2019-03-25 NOTE — H&P ADULT - ASSESSMENT
38 Male with PMH of Type 2 DM, Migraine, ESRD on HD for 3 yrs (left arm graft, TTS), Morbid obesity, Chr dyspnea (uses home O2 prn) presented to hospital for acute onset of Left arm and leg cramps started last night 12am. Admitted to medicine for dialysis induced Cramps. 38 Male with PMH of Type 2 DM, Migraine, ESRD on HD for 3 yrs (left arm graft, TTS), Morbid obesity, HTN, Chr dyspnea (uses home O2 prn) presented to hospital for acute onset of Left arm and leg cramps started last night 12am. Admitted to medicine for dialysis induced Cramps.

## 2019-03-25 NOTE — H&P ADULT - HISTORY OF PRESENT ILLNESS
38 Male with PMH of Type 2 DM, Migraine, ESRD (left arm graft, TTS), Morbid obesity  presented to hospital for acute onset of Left arm and leg cramps started last night 12am. 38 Male with PMH of Type 2 DM, Migraine, ESRD on HD for 3 yrs (left arm graft, TTS), Morbid obesity, Chr dyspnea (uses home O2 prn) presented to hospital for acute onset of Left arm and leg cramps started last night 12am. Pt has been having these upper extremity cramps for few months but this time it also involved his leg. Cramping is intermittent and is associated with severe pain. He is not able to unlock his Lt hand. According to patient, it doesn't happen during hospital dialysis when we take out less fluids as compared to outpatient clinic dialysis when they try to take double the amount of fluids. Tylenol and Gabapentin doesn't help. Pt is unsure why he is on dialysis and never got biopsy. He is constipated for 3 days. He recently started drink alcohol due to stress in life. No fever, focal weakness, chest pain, new shortness of breath, abdominal pain. Despite being on dialysis, he still makes urine.     FH: Sister has RA and Lupus  SH: Smokes and uses other drugs often. Lives with family.     ED Course: Hemodynamically stable. Mildly hypertensive and tachycardic. Labs showed megaloblastic anemia and leukocytosis. Pt was given morphine, tylenol, valium for upper extremity pain. 38 Male with PMH of Type 2 DM, Migraine, ESRD on HD for 3 yrs (left arm graft, TTS), Morbid obesity, Chr dyspnea (uses home O2 prn) presented to hospital for acute onset of Left arm and leg cramps started last night 12am. Pt has been having these upper extremity cramps for few months but this time it also involved his leg. Cramping is intermittent and is associated with severe pain. He is not able to unlock his Lt hand. According to patient, it doesn't happen during hospital dialysis when we take out less fluids as compared to outpatient clinic dialysis when they try to take double the amount of fluids. Tylenol and Gabapentin doesn't help. Pt is unsure why he is on dialysis and never got biopsy. He is constipated for 3 days. He recently started drinking alcohol due to stress in life. No fever, focal weakness, chest pain, new shortness of breath, abdominal pain. Despite being on dialysis, he still makes urine.     FH: Sister has RA and Lupus  SH: Smokes and uses other drugs often. Lives with family.     ED Course: Hemodynamically stable. Mildly hypertensive and tachycardic. Labs showed megaloblastic anemia and leukocytosis. Pt was given morphine, tylenol, valium for upper extremity pain. 38 Male with PMH of Type 2 DM, Migraine, ESRD on HD for 3 yrs (left arm graft, TTS), Morbid obesity, HTN, Chr dyspnea (uses home O2 prn) presented to hospital for acute onset of Left arm and leg cramps started last night 12am. Pt has been having these upper extremity cramps for few months but this time it also involved his leg. Cramping is intermittent and is associated with severe pain. He is not able to unlock his Lt hand. According to patient, it doesn't happen during hospital dialysis when we take out less fluids as compared to outpatient clinic dialysis when they try to take double the amount of fluids. Tylenol and Gabapentin doesn't help. Pt is unsure why he is on dialysis and never got biopsy. He is constipated for 3 days. He recently started drinking alcohol due to stress in life. No fever, focal weakness, chest pain, new shortness of breath, abdominal pain. Despite being on dialysis, he still makes urine.     FH: Sister has RA and Lupus  SH: Smokes and uses other drugs often. Lives with family.     ED Course: Hemodynamically stable. Mildly hypertensive and tachycardic. Labs showed megaloblastic anemia and leukocytosis. Pt was given morphine, tylenol, valium for upper extremity pain. 38 Male with PMH of Type 2 DM, Migraine, ESRD on HD for 3 yrs (left arm graft, TTS), Morbid obesity, HTN, Chr dyspnea (uses home O2 prn) presented to hospital for acute onset of Left arm and leg cramps started last night 12am. Pt has been having these upper extremity cramps for few months but this time it also involved his leg. Cramping is intermittent and is associated with severe pain. He is not able to unlock his Lt hand. According to patient, it doesn't happen during hospital dialysis when we take out less fluids as compared to outpatient clinic dialysis when they try to take double the amount of fluids. Tylenol and Gabapentin doesn't help. Pt is unsure why he is on dialysis and never got biopsy. He is constipated for 3 days. He recently started drinking alcohol due to stress in life. No fever, focal weakness, chest pain, new shortness of breath, abdominal pain. Despite being on dialysis, he still makes urine.     FH: Sister has RA and Lupus  SH: Smokes and uses other drugs often. Lives with family.     ED Course: Hemodynamically stable. Mildly hypertensive and tachycardic. Labs showed megaloblastic anemia and leukocytosis. Pt was given morphine, tylenol, valium for upper extremity pain. Pt was given 1.5 liter bolus.

## 2019-03-25 NOTE — H&P ADULT - PROBLEM SELECTOR PLAN 6
Improve VTE score: 2 (heparin sq)  [ ] Previous VTE                                               3  [ ] Thrombophilia                                             2  [x] Lower limb paralysis                                   2    [ ] Current Cancer                                           2   [x] Immobilization > 24 hrs                              1  [ ] ICU/CCU stay > 24 hours                            1  [ ] Age > 60                                                       1 C/w Seroquel

## 2019-03-25 NOTE — ED PROVIDER NOTE - CLINICAL SUMMARY MEDICAL DECISION MAKING FREE TEXT BOX
Spasm lessening but still present after Ativan, Morphine and tylenol. Case dw nephrologist Dr. La, advised to start Robaxin. Will give Robaxin 1.5gm and reevaluate.

## 2019-03-25 NOTE — MEDICAL STUDENT ADULT H&P (EDUCATION) - NS MD HP STUD SOCIAL HX FT
Previously worked as a , has been on disability for 3 years.   Smoke- Reports 1 pack on the weekend (x2weeks) Had been smoking 1 pack  Alcohol- Has recently began drinking alcohol. Admits to binge drinking weekends (x2 weeks)  Illicit drug use- Daily marijuana (usually smoked, but has been using edibles only x2 weeks). Has tried other illicit drugs ( does not remember name) recently, but does not use on daily basis. Previously worked as a , has been on disability for 3 years.   Smoke- Reports 1 pack on the weekend (x2weeks) Had been smoking 1 pack  Alcohol- Has recently began drinking alcohol. Admits to binge drinking weekends (x2 weeks)  Illicit drug use- Daily marijuana (usually smoked, but has been using edibles only x2 weeks). Has tried other illicit drugs recently, but does not use on daily basis.

## 2019-03-25 NOTE — MEDICAL STUDENT ADULT H&P (EDUCATION) - NS MD HP STUD PMH FT
Bipolar disorder (2017)  Diabetes ( 2015)  ESRD (2016)  -Dialysis (T, Th, Sat)  HTN (2018)  Obesity  Schizophrenia (2017)

## 2019-03-25 NOTE — CONSULT NOTE ADULT - ASSESSMENT
# ESRD HD in AM, Will plan HD with NA modelling and conservative ultrafiltration to avoid cramping.  Robaxin pre-HD  # Anemia of CKD. Procrit on HD  # HTN controlled.  # high IDWG discussed compliance with salt and fluid restriction re-inforced.    Many thanks for the consult.

## 2019-03-25 NOTE — CONSULT NOTE ADULT - SUBJECTIVE AND OBJECTIVE BOX
HPI: 38 year old male with ESRD on HD T/Th/S via LUE AV graft which was placed 3 years ago in [place] presents to ED complaining of severe cramping and spasm in his left hand which began around midnight. States he feels the cramping from his shoulder all the way to his toes on his left side, with no complaints of his right side. Per patient he feels cramping usually when they take off greater then 3 liters at HD and about 6L were removed at his last treatment on Saturday. Reports no issues with accessing the graft, denies numbness and tingling in distal extremity, denies arm swelling. Admits to sleeping upright in chair recently which has worsened his lower extremity edema and reports shortness of breath which he was supposed to follow up as an outpatient with Dr. Parra tomorrow. Denies fever, chills.     PAST MEDICAL & SURGICAL HISTORY:  Migraine  Obesity  ESRD (end stage renal disease)  HTN (hypertension)  DM (diabetes mellitus)  Bipolar disorder  Schizophrenia  ESRD on dialysis  Morbid obesity with BMI of 40.0-44.9, adult  Diabetes  H/O hernia repair  H/O hernia repair    Review of Systems: Contained within HPI    MEDICATIONS  (STANDING):  diazepam    Tablet 2 milliGRAM(s) Oral Once  sodium chloride 0.9% Bolus 500 milliLiter(s) IV Bolus once    MEDICATIONS  (PRN):    Allergies    aspirin (Short breath)  aspirin (Swelling)  penicillin (Short breath)  penicillins (Swelling)  shellfish (Unknown)    FAMILY HISTORY:  Family history of COPD (chronic obstructive pulmonary disease) (Mother)  Family history of diabetes mellitus (Mother)    Vital Signs Last 24 Hrs  T(C): 36.9 (25 Mar 2019 07:41), Max: 37 (25 Mar 2019 05:09)  T(F): 98.4 (25 Mar 2019 07:41), Max: 98.6 (25 Mar 2019 05:09)  HR: 95 (25 Mar 2019 07:41) (95 - 110)  BP: 163/105 (25 Mar 2019 07:41) (160/100 - 163/105)  RR: 19 (25 Mar 2019 07:41) (18 - 19)  SpO2: 99% (25 Mar 2019 07:41) (98% - 99%)    Physical Exam:    General:  Appears stated age, well-groomed, well-nourished, no distress  Eyes: EOMI  HENT:  WNL, no JVD  Chest: respirations nonlabored  Cardiovascular:  Regular rate & rhythm  Abdomen:  obese, soft  Extremities: LUE cramp, hand stiff. Distal extremity warm, sensation intact  Vascular: left radial pulse 2+, LUE AV graft with +thrill and bruit. No erythema or fluctuance, no signs of infection. No edema  Skin: warm and dry  Musculoskeletal: no calf tenderness  Neuro:  Alert, oriented to time, place and person   Psych: normal affect    LABS:                        8.7    12.75 )-----------( 409      ( 25 Mar 2019 05:48 )             28.3     03-25    138  |  97  |  61<H>  ----------------------------<  148<H>  4.6   |  30  |  9.41<H>    Ca    8.0<L>      25 Mar 2019 05:48  Phos  7.5     03-25  Mg     2.2     03-25    TPro  7.3  /  Alb  2.6<L>  /  TBili  0.2  /  DBili  x   /  AST  16  /  ALT  22  /  AlkPhos  95  03-25    RADIOLOGY & ADDITIONAL STUDIES:

## 2019-03-25 NOTE — H&P ADULT - PROBLEM SELECTOR PLAN 3
Pt uses Home O2 as need from her mothers prescription  He is going to see Dr Thurston (pulmonologist) for PFTs and sleep study  Waiting to see Dr Parks for o/p stress test.

## 2019-03-25 NOTE — H&P ADULT - NSICDXFAMILYHX_GEN_ALL_CORE_FT
FAMILY HISTORY:  Mother  Still living? Unknown  Family history of COPD (chronic obstructive pulmonary disease), Age at diagnosis: Age Unknown  Family history of diabetes mellitus, Age at diagnosis: Age Unknown

## 2019-03-25 NOTE — MEDICAL STUDENT ADULT H&P (EDUCATION) - NS MD HP STUD PE MUSCULO FT
RUE and RLE 5/5 strength, Left Leg 4/5 strength, Left UA -4/5 to left hand contracted and locked intermittently with moments of relaxation, cramping of left leg when flexed.

## 2019-03-25 NOTE — MEDICAL STUDENT ADULT H&P (EDUCATION) - NS MD HP STUD ASPLAN PLAN FT
Problem/Plan:  Cramping Left Hand/Left leg  Plan:  -Robaxin x1 dose given as discussed with Nephrologist, Dr. La  -start Flexeril  PRN  -Pain management with intermittent morphine+ ofermiv  -monitor electrolytes    Problem/Plan:   ESRD  Plan:  -Dialysis scheduled for Tues, Thurs, Saturday  -Monitor I/O's     Problem/Plan:   Diabetes   Plan  -c/w Lantus and Novolog sliding scale  -monitor BS AC/HS  -HgA1c needed    Problem/Plan:   Hypertension  Plan:  -monitor BP  -c/w norvasc    Problem/Plan:   Bipolar/schizophrenia  Plan:  -c/w Mirtazapine and Seroquel

## 2019-03-25 NOTE — CONSULT NOTE ADULT - SUBJECTIVE AND OBJECTIVE BOX
[facility] : Progress Note :: [phone], (office [phone]),   Dr La / Dr Hui / Dr Barber / Dr Villalobos / Dr Hang CASTELLANO / Dr Mehta / Dr Sanchez / Dr Alber dumont  _____________________________________________________________________________________________  Patient is a 38y Male whom presented to the hospital with severe cramping after dialysis. He has a history of high intradialytic weight gains and indiscretion with salt intake. In ED s/p Ativan,  robaxin and flexeril with improved symptoms. Also s/p NS bolus. renal consulted for ESRD.    PAST MEDICAL & SURGICAL HISTORY:  Migraine  Obesity  ESRD (end stage renal disease)  HTN (hypertension)  DM (diabetes mellitus)  Bipolar disorder  Schizophrenia  ESRD on dialysis  Morbid obesity with BMI of 40.0-44.9, adult  Diabetes  H/O hernia repair  H/O hernia repair    aspirin (Short breath)  aspirin (Swelling)  penicillin (Short breath)  penicillins (Swelling)  shellfish (Unknown)    Home Medications Reviewed  Hospital Medications:   MEDICATIONS  (STANDING):  amLODIPine   Tablet 5 milliGRAM(s) Oral daily  ascorbic acid 500 milliGRAM(s) Oral daily  clopidogrel Tablet 75 milliGRAM(s) Oral daily  furosemide    Tablet 40 milliGRAM(s) Oral daily  gabapentin 400 milliGRAM(s) Oral three times a day  heparin  Injectable 5000 Unit(s) SubCutaneous every 8 hours  insulin glargine Injectable (LANTUS) 10 Unit(s) SubCutaneous at bedtime  insulin lispro (HumaLOG) corrective regimen sliding scale   SubCutaneous Before meals and at bedtime  nicotine -   7 mG/24Hr(s) Patch 1 patch Transdermal daily  polyethylene glycol 3350 17 Gram(s) Oral daily  QUEtiapine 200 milliGRAM(s) Oral daily  simvastatin 40 milliGRAM(s) Oral at bedtime  vitamin E 400 International Unit(s) Oral at bedtime    SOCIAL HISTORY:  Denies ETOh,Smoking,   FAMILY HISTORY:  Family history of COPD (chronic obstructive pulmonary disease)  Family history of diabetes mellitus        VITALS:  T(F): 97.9 (03-25-19 @ 16:50), Max: 98.6 (03-25-19 @ 05:09)  HR: 97 (03-25-19 @ 20:32)  BP: 154/98 (03-25-19 @ 20:32)  RR: 20 (03-25-19 @ 16:50)  SpO2: 98% (03-25-19 @ 16:50)  Wt(kg): --    Height (cm): 195.58 (03-25 @ 05:09)  Weight (kg): 167.8 (03-25 @ 05:09)  BMI (kg/m2): 43.9 (03-25 @ 05:09)  BSA (m2): 2.9 (03-25 @ 05:09)  PHYSICAL EXAM:  Constitutional: Obese.  HEENT: anicteric sclera, oropharynx clear, MMM  Neck: No JVD  Respiratory: CTAB, no wheezes, rales or rhonchi  Cardiovascular: S1, S2, RRR  Gastrointestinal: BS+, soft, NT/ND  Extremities:  peripheral edema++  Neurological: A/O x 3, no focal deficits  : No CVA tenderness. No hernandez.   Skin: No rashes  Vascular Access: LUEAVF with thrill and bruit++    LABS:  03-25    138  |  97  |  61<H>  ----------------------------<  148<H>  4.6   |  30  |  9.41<H>    Ca    8.0<L>      25 Mar 2019 05:48  Phos  7.5     03-25  Mg     2.2     03-25    TPro  7.3  /  Alb  2.6<L>  /  TBili  0.2  /  DBili      /  AST  16  /  ALT  22  /  AlkPhos  95  03-25    Creatinine Trend: 9.41 <--                        8.7    12.75 )-----------( 409      ( 25 Mar 2019 05:48 )             28.3     Urine Studies:      RADIOLOGY & ADDITIONAL STUDIES:

## 2019-03-25 NOTE — CHART NOTE - NSCHARTNOTEFT_GEN_A_CORE
EVENT: called to bedside by pt while on rounds; mild inspiratory wheezes, occasional cough; up in bed on smart phone    OBJECTIVE:  Vital Signs Last 24 Hrs  T(C): 36.6 (25 Mar 2019 16:50), Max: 37 (25 Mar 2019 05:09)  T(F): 97.9 (25 Mar 2019 16:50), Max: 98.6 (25 Mar 2019 05:09)  HR: 97 (25 Mar 2019 20:32) (92 - 110)  BP: 154/98 (25 Mar 2019 20:32) (103/113 - 181/99)  BP(mean): --  RR: 20 (25 Mar 2019 16:50) (18 - 20)  SpO2: 98% (25 Mar 2019 16:50) (94% - 99%)    FOCUSED PHYSICAL EXAM:  Resp: mild inspiratory wheeze on left lung field, decreased lung sounds on right lung field  Cardiac: S1 S2 regular  Abdomen: obese, rounded  Neuro: alert, oriented X 3  LABS:                        8.7    12.75 )-----------( 409      ( 25 Mar 2019 05:48 )             28.3     03-25    138  |  97  |  61<H>  ----------------------------<  148<H>  4.6   |  30  |  9.41<H>    Ca    8.0<L>      25 Mar 2019 05:48  Phos  7.5     03-25  Mg     2.2     03-25    TPro  7.3  /  Alb  2.6<L>  /  TBili  0.2  /  DBili  x   /  AST  16  /  ALT  22  /  AlkPhos  95  03-25      EKG: 3/10/19  Diagnosis Line Normal sinus rhythm  Normal ECG    IMAGING:3/25/19  Frontal expiratory view of the chest shows the heart to be borderline in   size. The lungs show similar pulmonary vascularity and there is no   evidence of pneumothorax nor pleural effusion.    IMPRESSION:  No interval change.      ASSESSMENT:  HPI:  38 Male with PMH of Type 2 DM, Migraine, ESRD on HD for 3 yrs (left arm graft, TTS), Morbid obesity, HTN, Chr dyspnea (uses home O2 prn) presented to hospital for acute onset of Left arm and leg cramps started last night 12am. Pt has been having these upper extremity cramps for few months but this time it also involved his leg. Cramping is intermittent and is associated with severe pain. He is not able to unlock his Lt hand. According to patient, it doesn't happen during hospital dialysis when we take out less fluids as compared to outpatient clinic dialysis when they try to take double the amount of fluids. Tylenol and Gabapentin doesn't help. Pt is unsure why he is on dialysis and never got biopsy. He is constipated for 3 days. He recently started drinking alcohol due to stress in life. No fever, focal weakness, chest pain, new shortness of breath, abdominal pain. Despite being on dialysis, he still makes urine.     FH: Sister has RA and Lupus  SH: Smokes and uses other drugs often. Lives with family.     ED Course: Hemodynamically stable. Mildly hypertensive and tachycardic. Labs showed megaloblastic anemia and leukocytosis. Pt was given morphine, tylenol, valium for upper extremity pain. Pt was given 1.5 liter bolus. (25 Mar 2019 11:46)      PLAN:   1. Duoneb 3 ml  x 1  2. Monitor resp status    FOLLOW UP / RESULT:

## 2019-03-25 NOTE — ED PROVIDER NOTE - OBJECTIVE STATEMENT
Chief complaint of cramps and spasms progressively worsening after HD 2 days ago. Pt has left arm graft and states had full course of HD at [facility]. Pt states total of 5 liters of fluid removed. Pre-weight HD was 194 lbs, post weight HD was 192.4.

## 2019-03-25 NOTE — ED ADULT TRIAGE NOTE - CHIEF COMPLAINT QUOTE
" I have dialysis yesterday Saturday and I have pain and spasm on my left arm since twelve midnight, it happened before "

## 2019-03-25 NOTE — H&P ADULT - NSHPPHYSICALEXAM_GEN_ALL_CORE
Vital Signs Last 24 Hrs  T(C): 36.9 (25 Mar 2019 07:41), Max: 37 (25 Mar 2019 05:09)  T(F): 98.4 (25 Mar 2019 07:41), Max: 98.6 (25 Mar 2019 05:09)  HR: 95 (25 Mar 2019 07:41) (95 - 110)  BP: 163/105 (25 Mar 2019 07:41) (160/100 - 163/105)  RR: 19 (25 Mar 2019 07:41) (18 - 19)  SpO2: 99% (25 Mar 2019 07:41) (98% - 99%)  .  GENERAL: Well developed, Obese male, NAD  HEENT:  Normocephalic/Atraumatic, reactive light reflex, moist mucous membranes  NECK: Supple, no JVD  RESP: Symmetric movement of the chest, clear to auscultation bilaterally, no wheeze  CVS: Difficult to auscultate S1 and S2, no murmur, rubs or gallops noted  GI: Normal active bowel sounds present, abdomen soft, + LLQ tender, non distended  EXTREMITIES:  1+ edema, no clubbing, cyanosis, LUE fistula   MSK: 4/5 strength bilateral upper and lower extremities, Left upper arm contracted.   PSYCH: Normal mood, normal affect observed    NEURO: Alert and oriented x 3

## 2019-03-25 NOTE — H&P ADULT - ATTENDING COMMENTS
Patient was interviewed and examined in the ED with Dr. Yu.     He c/o spasm in LUE, which he has had before.     Patient has ESRD, on dialysis.  Process occurs at home, not while on dialysis.     Alert, obese man  Vital Signs Last 24 Hrs  T(C): 36.6 (25 Mar 2019 16:50), Max: 37 (25 Mar 2019 05:09)  T(F): 97.9 (25 Mar 2019 16:50), Max: 98.6 (25 Mar 2019 05:09)  HR: 95 (25 Mar 2019 19:15) (92 - 110)  BP: 175/101 (25 Mar 2019 19:15) (103/113 - 181/99)  BP(mean): --  RR: 20 (25 Mar 2019 16:50) (18 - 20)  SpO2: 98% (25 Mar 2019 16:50) (94% - 99%)  LUE contracted    IMP: Spasm of LUE, no metabolic abnormality.  Patient requests opioid analgesic to treat pain.          ESRD, on dialysis         DM, obesity  Plan: trial of benzodiazepine muscle relaxant.           Nephrology, Dr. La          Check nutritional status, B12, etc.  Will consider trial of hypertonic saline or D50, if Nephrology agrees.

## 2019-03-26 ENCOUNTER — APPOINTMENT (OUTPATIENT)
Dept: PULMONOLOGY | Facility: CLINIC | Age: 39
End: 2019-03-26

## 2019-03-26 ENCOUNTER — TRANSCRIPTION ENCOUNTER (OUTPATIENT)
Age: 39
End: 2019-03-26

## 2019-03-26 DIAGNOSIS — N18.6 END STAGE RENAL DISEASE: ICD-10-CM

## 2019-03-26 LAB
ALBUMIN SERPL ELPH-MCNC: 2.5 G/DL — LOW (ref 3.5–5)
ALP SERPL-CCNC: 107 U/L — SIGNIFICANT CHANGE UP (ref 40–120)
ALT FLD-CCNC: 25 U/L DA — SIGNIFICANT CHANGE UP (ref 10–60)
ANION GAP SERPL CALC-SCNC: 9 MMOL/L — SIGNIFICANT CHANGE UP (ref 5–17)
AST SERPL-CCNC: 16 U/L — SIGNIFICANT CHANGE UP (ref 10–40)
BASOPHILS # BLD AUTO: 0.04 K/UL — SIGNIFICANT CHANGE UP (ref 0–0.2)
BASOPHILS NFR BLD AUTO: 0.3 % — SIGNIFICANT CHANGE UP (ref 0–2)
BILIRUB SERPL-MCNC: 0.3 MG/DL — SIGNIFICANT CHANGE UP (ref 0.2–1.2)
BUN SERPL-MCNC: 69 MG/DL — HIGH (ref 7–18)
CALCIUM SERPL-MCNC: 8 MG/DL — LOW (ref 8.4–10.5)
CALCIUM SERPL-MCNC: 8.9 MG/DL — SIGNIFICANT CHANGE UP (ref 8.4–10.5)
CHLORIDE SERPL-SCNC: 99 MMOL/L — SIGNIFICANT CHANGE UP (ref 96–108)
CO2 SERPL-SCNC: 28 MMOL/L — SIGNIFICANT CHANGE UP (ref 22–31)
CREAT SERPL-MCNC: 11.8 MG/DL — HIGH (ref 0.5–1.3)
EOSINOPHIL # BLD AUTO: 0.44 K/UL — SIGNIFICANT CHANGE UP (ref 0–0.5)
EOSINOPHIL NFR BLD AUTO: 3.6 % — SIGNIFICANT CHANGE UP (ref 0–6)
FOLATE SERPL-MCNC: 16.7 NG/ML — SIGNIFICANT CHANGE UP
GLUCOSE SERPL-MCNC: 151 MG/DL — HIGH (ref 70–99)
HCT VFR BLD CALC: 26.8 % — LOW (ref 39–50)
HGB BLD-MCNC: 8.1 G/DL — LOW (ref 13–17)
IMM GRANULOCYTES NFR BLD AUTO: 0.3 % — SIGNIFICANT CHANGE UP (ref 0–1.5)
LYMPHOCYTES # BLD AUTO: 1.91 K/UL — SIGNIFICANT CHANGE UP (ref 1–3.3)
LYMPHOCYTES # BLD AUTO: 15.5 % — SIGNIFICANT CHANGE UP (ref 13–44)
MAGNESIUM SERPL-MCNC: 2.4 MG/DL — SIGNIFICANT CHANGE UP (ref 1.6–2.6)
MCHC RBC-ENTMCNC: 30.2 GM/DL — LOW (ref 32–36)
MCHC RBC-ENTMCNC: 31.8 PG — SIGNIFICANT CHANGE UP (ref 27–34)
MCV RBC AUTO: 105.1 FL — HIGH (ref 80–100)
MONOCYTES # BLD AUTO: 0.7 K/UL — SIGNIFICANT CHANGE UP (ref 0–0.9)
MONOCYTES NFR BLD AUTO: 5.7 % — SIGNIFICANT CHANGE UP (ref 2–14)
NEUTROPHILS # BLD AUTO: 9.23 K/UL — HIGH (ref 1.8–7.4)
NEUTROPHILS NFR BLD AUTO: 74.6 % — SIGNIFICANT CHANGE UP (ref 43–77)
NRBC # BLD: 0 /100 WBCS — SIGNIFICANT CHANGE UP (ref 0–0)
PHOSPHATE SERPL-MCNC: 9.6 MG/DL — SIGNIFICANT CHANGE UP (ref 2.5–4.5)
PLATELET # BLD AUTO: 350 K/UL — SIGNIFICANT CHANGE UP (ref 150–400)
POTASSIUM SERPL-MCNC: 5.8 MMOL/L — HIGH (ref 3.5–5.3)
POTASSIUM SERPL-SCNC: 5.8 MMOL/L — HIGH (ref 3.5–5.3)
PROT SERPL-MCNC: 6.7 G/DL — SIGNIFICANT CHANGE UP (ref 6–8.3)
PTH-INTACT FLD-MCNC: 559 PG/ML — HIGH (ref 15–65)
RBC # BLD: 2.55 M/UL — LOW (ref 4.2–5.8)
RBC # FLD: 15.4 % — HIGH (ref 10.3–14.5)
SODIUM SERPL-SCNC: 136 MMOL/L — SIGNIFICANT CHANGE UP (ref 135–145)
VIT B12 SERPL-MCNC: 1008 PG/ML — SIGNIFICANT CHANGE UP (ref 232–1245)
WBC # BLD: 12.36 K/UL — HIGH (ref 3.8–10.5)
WBC # FLD AUTO: 12.36 K/UL — HIGH (ref 3.8–10.5)

## 2019-03-26 PROCEDURE — 99223 1ST HOSP IP/OBS HIGH 75: CPT

## 2019-03-26 PROCEDURE — 99233 SBSQ HOSP IP/OBS HIGH 50: CPT | Mod: GC

## 2019-03-26 RX ORDER — GABAPENTIN 400 MG/1
300 CAPSULE ORAL DAILY
Qty: 0 | Refills: 0 | Status: DISCONTINUED | OUTPATIENT
Start: 2019-03-26 | End: 2019-03-28

## 2019-03-26 RX ORDER — METHOCARBAMOL 500 MG/1
1500 TABLET, FILM COATED ORAL ONCE
Qty: 0 | Refills: 0 | Status: COMPLETED | OUTPATIENT
Start: 2019-03-26 | End: 2019-03-26

## 2019-03-26 RX ORDER — CHLORHEXIDINE GLUCONATE 213 G/1000ML
1 SOLUTION TOPICAL DAILY
Qty: 0 | Refills: 0 | Status: DISCONTINUED | OUTPATIENT
Start: 2019-03-26 | End: 2019-03-28

## 2019-03-26 RX ORDER — ACETAMINOPHEN 500 MG
650 TABLET ORAL EVERY 6 HOURS
Qty: 0 | Refills: 0 | Status: DISCONTINUED | OUTPATIENT
Start: 2019-03-26 | End: 2019-03-28

## 2019-03-26 RX ORDER — ERYTHROPOIETIN 10000 [IU]/ML
10000 INJECTION, SOLUTION INTRAVENOUS; SUBCUTANEOUS
Qty: 0 | Refills: 0 | Status: DISCONTINUED | OUTPATIENT
Start: 2019-03-26 | End: 2019-03-28

## 2019-03-26 RX ORDER — MORPHINE SULFATE 50 MG/1
2 CAPSULE, EXTENDED RELEASE ORAL ONCE
Qty: 0 | Refills: 0 | Status: DISCONTINUED | OUTPATIENT
Start: 2019-03-26 | End: 2019-03-26

## 2019-03-26 RX ORDER — ACETAMINOPHEN 500 MG
1000 TABLET ORAL ONCE
Qty: 0 | Refills: 0 | Status: DISCONTINUED | OUTPATIENT
Start: 2019-03-26 | End: 2019-03-26

## 2019-03-26 RX ADMIN — MORPHINE SULFATE 2 MILLIGRAM(S): 50 CAPSULE, EXTENDED RELEASE ORAL at 08:25

## 2019-03-26 RX ADMIN — GABAPENTIN 400 MILLIGRAM(S): 400 CAPSULE ORAL at 06:06

## 2019-03-26 RX ADMIN — AMLODIPINE BESYLATE 5 MILLIGRAM(S): 2.5 TABLET ORAL at 06:06

## 2019-03-26 RX ADMIN — ERYTHROPOIETIN 10000 UNIT(S): 10000 INJECTION, SOLUTION INTRAVENOUS; SUBCUTANEOUS at 16:29

## 2019-03-26 RX ADMIN — HEPARIN SODIUM 5000 UNIT(S): 5000 INJECTION INTRAVENOUS; SUBCUTANEOUS at 13:32

## 2019-03-26 RX ADMIN — Medication 1: at 22:42

## 2019-03-26 RX ADMIN — HEPARIN SODIUM 5000 UNIT(S): 5000 INJECTION INTRAVENOUS; SUBCUTANEOUS at 22:35

## 2019-03-26 RX ADMIN — SIMVASTATIN 40 MILLIGRAM(S): 20 TABLET, FILM COATED ORAL at 22:34

## 2019-03-26 RX ADMIN — Medication 40 MILLIGRAM(S): at 06:06

## 2019-03-26 RX ADMIN — MORPHINE SULFATE 2 MILLIGRAM(S): 50 CAPSULE, EXTENDED RELEASE ORAL at 08:09

## 2019-03-26 RX ADMIN — CLOPIDOGREL BISULFATE 75 MILLIGRAM(S): 75 TABLET, FILM COATED ORAL at 12:31

## 2019-03-26 RX ADMIN — CYCLOBENZAPRINE HYDROCHLORIDE 10 MILLIGRAM(S): 10 TABLET, FILM COATED ORAL at 06:06

## 2019-03-26 RX ADMIN — Medication 3 MILLILITER(S): at 07:38

## 2019-03-26 RX ADMIN — Medication 1 PATCH: at 13:32

## 2019-03-26 RX ADMIN — Medication 500 MILLIGRAM(S): at 12:31

## 2019-03-26 RX ADMIN — METHOCARBAMOL 1500 MILLIGRAM(S): 500 TABLET, FILM COATED ORAL at 14:24

## 2019-03-26 RX ADMIN — GABAPENTIN 300 MILLIGRAM(S): 400 CAPSULE ORAL at 22:34

## 2019-03-26 RX ADMIN — Medication 1 MILLIGRAM(S): at 04:33

## 2019-03-26 RX ADMIN — HEPARIN SODIUM 5000 UNIT(S): 5000 INJECTION INTRAVENOUS; SUBCUTANEOUS at 06:05

## 2019-03-26 RX ADMIN — CHLORHEXIDINE GLUCONATE 1 APPLICATION(S): 213 SOLUTION TOPICAL at 12:23

## 2019-03-26 RX ADMIN — POLYETHYLENE GLYCOL 3350 17 GRAM(S): 17 POWDER, FOR SOLUTION ORAL at 12:32

## 2019-03-26 RX ADMIN — CYCLOBENZAPRINE HYDROCHLORIDE 10 MILLIGRAM(S): 10 TABLET, FILM COATED ORAL at 22:35

## 2019-03-26 RX ADMIN — Medication 1 PATCH: at 22:16

## 2019-03-26 RX ADMIN — INSULIN GLARGINE 10 UNIT(S): 100 INJECTION, SOLUTION SUBCUTANEOUS at 22:34

## 2019-03-26 RX ADMIN — QUETIAPINE FUMARATE 200 MILLIGRAM(S): 200 TABLET, FILM COATED ORAL at 12:31

## 2019-03-26 NOTE — PROGRESS NOTE ADULT - ASSESSMENT
Patient is a 37 y/o  Male with PMH of Type 2 DM, Migraine, ESRD on HD for 3 yrs (left arm graft, TTS), Morbid obesity, HTN, Chr dyspnea (uses home O2 prn) presented to hospital for acute onset of Left arm and leg cramps started last night 12am. Admitted to medicine for dialysis induced Cramps.

## 2019-03-26 NOTE — PROGRESS NOTE ADULT - SUBJECTIVE AND OBJECTIVE BOX
PGY 1 Note discussed with supervising resident and primary attending    Patient is a 38y old  Male who presents with a chief complaint of Left arm and leg cramps (26 Mar 2019 15:42)      INTERVAL HPI/OVERNIGHT EVENTS: Pt seen and examined at bedside. Pt has no new complains other than spasmodic pain.    MEDICATIONS  (STANDING):  amLODIPine   Tablet 5 milliGRAM(s) Oral daily  ascorbic acid 500 milliGRAM(s) Oral daily  chlorhexidine 2% Cloths 1 Application(s) Topical daily  clopidogrel Tablet 75 milliGRAM(s) Oral daily  epoetin cyndie Injectable 62679 Unit(s) IV Push <User Schedule>  furosemide    Tablet 40 milliGRAM(s) Oral daily  heparin  Injectable 5000 Unit(s) SubCutaneous every 8 hours  insulin glargine Injectable (LANTUS) 10 Unit(s) SubCutaneous at bedtime  insulin lispro (HumaLOG) corrective regimen sliding scale   SubCutaneous Before meals and at bedtime  nicotine -   7 mG/24Hr(s) Patch 1 patch Transdermal daily  polyethylene glycol 3350 17 Gram(s) Oral daily  QUEtiapine 200 milliGRAM(s) Oral daily  simvastatin 40 milliGRAM(s) Oral at bedtime  vitamin E 400 International Unit(s) Oral at bedtime    MEDICATIONS  (PRN):  cyclobenzaprine 10 milliGRAM(s) Oral three times a day PRN Muscle Spasm      __________________________________________________  REVIEW OF SYSTEMS:    CONSTITUTIONAL: No fever,   EYES: no acute visual disturbances  NECK: No pain or stiffness  RESPIRATORY: No cough; No shortness of breath  CARDIOVASCULAR: No chest pain, no palpitations  GASTROINTESTINAL: No pain. No nausea or vomiting; No diarrhea   NEUROLOGICAL: No headache or numbness, no tremors  MUSCULOSKELETAL: No joint pain, no muscle pain  GENITOURINARY: no dysuria, no frequency, no hesitancy  PSYCHIATRY: no depression , no anxiety  ALL OTHER  ROS negative        Vital Signs Last 24 Hrs  T(C): 36.8 (26 Mar 2019 08:33), Max: 37 (26 Mar 2019 00:44)  T(F): 98.2 (26 Mar 2019 08:33), Max: 98.6 (26 Mar 2019 00:44)  HR: 102 (26 Mar 2019 08:33) (92 - 105)  BP: 164/96 (26 Mar 2019 08:33) (103/113 - 181/99)  BP(mean): --  RR: 20 (26 Mar 2019 08:33) (18 - 20)  SpO2: 100% (26 Mar 2019 08:33) (94% - 100%)    ________________________________________________  PHYSICAL EXAM:  GENERAL: NAD  HEENT: Normocephalic;  conjunctivae and sclerae clear; moist mucous membranes;   NECK : supple  CHEST/LUNG: Clear to auscultation bilaterally with good air entry   HEART: S1 S2  regular; no murmurs, gallops or rubs  ABDOMEN: Soft, Nontender, Nondistended; Bowel sounds present  EXTREMITIES: no cyanosis; no edema; no calf tenderness  SKIN: warm and dry; no rash  NERVOUS SYSTEM:  Awake and alert; Oriented  to place, person and time ; no new deficits    _________________________________________________  LABS:                        8.1    12.36 )-----------( 350      ( 26 Mar 2019 14:46 )             26.8     03-26    136  |  99  |  69<H>  ----------------------------<  151<H>  5.8<H>   |  28  |  11.80<H>    Ca    8.0<L>      26 Mar 2019 14:46  Phos  9.6     03-26  Mg     2.4     03-26    TPro  6.7  /  Alb  2.5<L>  /  TBili  0.3  /  DBili  x   /  AST  16  /  ALT  25  /  AlkPhos  107  03-26        CAPILLARY BLOOD GLUCOSE      POCT Blood Glucose.: 106 mg/dL (26 Mar 2019 11:50)  POCT Blood Glucose.: 119 mg/dL (26 Mar 2019 08:26)  POCT Blood Glucose.: 125 mg/dL (25 Mar 2019 21:27)  POCT Blood Glucose.: 105 mg/dL (25 Mar 2019 17:37)      Plan of care was discussed with patient and /or primary care giver; all questions and concerns were addressed and care was aligned with patient's wishes.

## 2019-03-26 NOTE — CHART NOTE - NSCHARTNOTEFT_GEN_A_CORE
Vital Signs Last 24 Hrs  T(C): 37 (26 Mar 2019 00:44), Max: 37 (25 Mar 2019 05:09)  T(F): 98.6 (26 Mar 2019 00:44), Max: 98.6 (25 Mar 2019 05:09)  HR: 103 (26 Mar 2019 00:44) (92 - 110)  BP: 169/103 (26 Mar 2019 00:44) (103/113 - 181/99)  BP(mean): --  RR: 19 (26 Mar 2019 00:44) (18 - 20)  SpO2: 100% (26 Mar 2019 00:44) (94% - 100%)  BP trend reviewed. for HD today. AM meds furosemide to be given at schedule time :Tablet 40 milliGRAM(s) Oral daily and  amLODIPine  Tablet 5 milliGRAM(s) Oral daily. Will continue to monitor.

## 2019-03-26 NOTE — PROGRESS NOTE ADULT - PROBLEM SELECTOR PLAN 1
Continue Flexeril  S/P Robaxin before dialysis  F/u serum carnitine level  Pain management consult Continue Flexeril  S/P Robaxin before dialysis  F/u serum carnitine level  Avoid opoids  Pain management consult

## 2019-03-26 NOTE — CONSULT NOTE ADULT - PROBLEM SELECTOR RECOMMENDATION 9
- not an indication for opioids  - no morphine  - can do flexeril 10mg po q 12 hours  - no nsaids  - gabapentin - renal dose - gabapentin 300mg po daily only.  days of hd - 300mg po additional  - stool softeners  - oob

## 2019-03-26 NOTE — CONSULT NOTE ADULT - SUBJECTIVE AND OBJECTIVE BOX
Chief Complaint:    HPI:  38 Male with PMH of Type 2 DM, Migraine, ESRD on HD for 3 yrs (left arm graft, TTS), Morbid obesity, HTN, Chr dyspnea (uses home O2 prn) presented to hospital for acute onset of Left arm and leg cramps started last night 12am. Pt has been having these upper extremity cramps for few months but this time it also involved his leg. Cramping is intermittent and is associated with severe pain. He is not able to unlock his Lt hand. According to patient, it doesn't happen during hospital dialysis when we take out less fluids as compared to outpatient clinic dialysis when they try to take double the amount of fluids. Tylenol and Gabapentin doesn't help. Pt is unsure why he is on dialysis and never got biopsy. He is constipated for 3 days. He recently started drinking alcohol due to stress in life. No fever, focal weakness, chest pain, new shortness of breath, abdominal pain. Despite being on dialysis, he still makes urine.     FH: Sister has RA and Lupus  SH: Smokes and uses other drugs often. Lives with family.     ED Course: Hemodynamically stable. Mildly hypertensive and tachycardic. Labs showed megaloblastic anemia and leukocytosis. Pt was given morphine, tylenol, valium for upper extremity pain. Pt was given 1.5 liter bolus. (25 Mar 2019 11:46)      PAST MEDICAL & SURGICAL HISTORY:  Migraine  Obesity  ESRD (end stage renal disease)  HTN (hypertension)  DM (diabetes mellitus)  Bipolar disorder  Schizophrenia  ESRD on dialysis  Morbid obesity with BMI of 40.0-44.9, adult  Diabetes  H/O hernia repair  H/O hernia repair      FAMILY HISTORY:  Family history of COPD (chronic obstructive pulmonary disease)  Family history of diabetes mellitus      SOCIAL HISTORY:  [ ] Denies Smoking, Alcohol, or Drug Use    Allergies    aspirin (Short breath)  aspirin (Swelling)  penicillin (Short breath)  penicillins (Swelling)  shellfish (Unknown)    Intolerances        PAIN MEDICATIONS:  cyclobenzaprine 10 milliGRAM(s) Oral three times a day PRN  QUEtiapine 200 milliGRAM(s) Oral daily      Heme:  clopidogrel Tablet 75 milliGRAM(s) Oral daily  heparin  Injectable 5000 Unit(s) SubCutaneous every 8 hours    Antibiotics:    Cardiovascular:  amLODIPine   Tablet 5 milliGRAM(s) Oral daily  furosemide    Tablet 40 milliGRAM(s) Oral daily    GI:  polyethylene glycol 3350 17 Gram(s) Oral daily    Endocrine:  insulin glargine Injectable (LANTUS) 10 Unit(s) SubCutaneous at bedtime  insulin lispro (HumaLOG) corrective regimen sliding scale   SubCutaneous Before meals and at bedtime  simvastatin 40 milliGRAM(s) Oral at bedtime    All Other Medications:  ascorbic acid 500 milliGRAM(s) Oral daily  chlorhexidine 2% Cloths 1 Application(s) Topical daily  epoetin cyndie Injectable 14529 Unit(s) IV Push <User Schedule>  nicotine -   7 mG/24Hr(s) Patch 1 patch Transdermal daily  vitamin E 400 International Unit(s) Oral at bedtime      REVIEW OF SYSTEMS:    CONSTITUTIONAL: No fever, weight loss, or fatigue  EYES: No eye pain, visual disturbances, or discharge  ENMT:  No difficulty hearing, tinnitus, vertigo; No sinus or throat pain  NECK: No pain or stiffness  BREASTS: No pain, masses, or nipple discharge  RESPIRATORY: No cough, wheezing, chills or hemoptysis; No shortness of breath  CARDIOVASCULAR: No chest pain, palpitations, dizziness, or leg swelling  GASTROINTESTINAL: No abdominal or epigastric pain. No nausea, vomiting, or hematemesis; No diarrhea or constipation. No melena or hematochezia.  GENITOURINARY: No dysuria, frequency, hematuria, or incontinence  NEUROLOGICAL: No headaches, memory loss, loss of strength, numbness, or tremors  SKIN: No itching, burning, rashes, or lesions   LYMPH NODES: No enlarged glands  ENDOCRINE: No heat or cold intolerance; No hair loss  MUSCULOSKELETAL: No joint pain or swelling; No muscle, back, or extremity pain  PSYCHIATRIC: No depression, anxiety, mood swings, or difficulty sleeping  HEME/LYMPH: No easy bruising, or bleeding gums  ALLERY AND IMMUNOLOGIC: No hives or eczema      Vital Signs Last 24 Hrs  T(C): 36.8 (26 Mar 2019 08:33), Max: 37 (26 Mar 2019 00:44)  T(F): 98.2 (26 Mar 2019 08:33), Max: 98.6 (26 Mar 2019 00:44)  HR: 102 (26 Mar 2019 08:33) (92 - 105)  BP: 164/96 (26 Mar 2019 08:33) (103/113 - 181/99)  BP(mean): --  RR: 20 (26 Mar 2019 08:33) (18 - 20)  SpO2: 100% (26 Mar 2019 08:33) (94% - 100%)    PAIN SCORE:         SCALE USED: (1-10 VNRS)             PHYSICAL EXAM:    GENERAL: NAD, well-groomed, well-developed  HEAD:  Atraumatic, Normocephalic  EYES: EOMI, PERRLA, conjunctiva and sclera clear  ENMT: No tonsillar erythema, exudates, or enlargement; Moist mucous membranes, Good dentition, No lesions  NECK: Supple, No JVD, Normal thyroid  NERVOUS SYSTEM:  Alert & Oriented X3, Good concentration; Motor Strength 5/5 B/L upper and lower extremities; DTRs 2+ intact and symmetric  CHEST/LUNG: Clear to percussion bilaterally; No rales, rhonchi, wheezing, or rubs  HEART: Regular rate and rhythm; No murmurs, rubs, or gallops  ABDOMEN: Soft, Nontender, Nondistended; Bowel sounds present  EXTREMITIES:  2+ Peripheral Pulses, No clubbing, cyanosis, or edema  LYMPH: No lymphadenopathy noted  SKIN: No rashes or lesions    Back: No CVA tenderness, No paravetebral tenderness    LABS:                          8.1    12.36 )-----------( 350      ( 26 Mar 2019 14:46 )             26.8     03-26    136  |  99  |  69<H>  ----------------------------<  151<H>  5.8<H>   |  28  |  11.80<H>    Ca    8.0<L>      26 Mar 2019 14:46  Phos  7.5     03-25  Mg     2.4     03-26    TPro  6.7  /  Alb  2.5<L>  /  TBili  0.3  /  DBili  x   /  AST  16  /  ALT  25  /  AlkPhos  107  03-26          RADIOLOGY:    Drug Screen:        RECOMMENDATIONS    [ ]  [place]  Reviewed and Copied to Chart Chief Complaint: muscle spasms    HPI:  38 Male with PMH of Type 2 DM, Migraine, ESRD on HD for 3 yrs (left arm graft, TTS), Morbid obesity, HTN, Chr dyspnea (uses home O2 prn) presented to hospital for acute onset of Left arm and leg cramps started last night 12am. Pt has been having these upper extremity cramps for few months but this time it also involved his leg. Cramping is intermittent and is associated with severe pain. He is not able to unlock his Lt hand. According to patient, it doesn't happen during hospital dialysis when we take out less fluids as compared to outpatient clinic dialysis when they try to take double the amount of fluids. Tylenol and Gabapentin doesn't help. Pt is unsure why he is on dialysis and never got biopsy. He is constipated for 3 days. He recently started drinking alcohol due to stress in life. No fever, focal weakness, chest pain, new shortness of breath, abdominal pain. Despite being on dialysis, he still makes urine.     FH: Sister has RA and Lupus  SH: Smokes and uses other drugs often. Lives with family.     ED Course: Hemodynamically stable. Mildly hypertensive and tachycardic. Labs showed megaloblastic anemia and leukocytosis. Pt was given morphine, tylenol, valium for upper extremity pain. Pt was given 1.5 liter bolus. (25 Mar 2019 11:46)    38 year old male, well known to me.  Pt has frequent admissions for various different things.  Attempted to see pt three times - pt is in a deep sleep.  Attempted to see pt in HD and he sleeping and snoring. As per h/p - + history of left arm and left leg cramps.  Pt is esrd - on hd.  Currently pt is gabapentin 400mg po q 8 hours and morphine iv prn.      PAST MEDICAL & SURGICAL HISTORY:  Migraine  Obesity  ESRD (end stage renal disease)  HTN (hypertension)  DM (diabetes mellitus)  Bipolar disorder  Schizophrenia  ESRD on dialysis  Morbid obesity with BMI of 40.0-44.9, adult  Diabetes  H/O hernia repair  H/O hernia repair      FAMILY HISTORY:  Family history of COPD (chronic obstructive pulmonary disease)  Family history of diabetes mellitus      SOCIAL HISTORY:  - unable to obtain pt due to sedation    aspirin (Short breath)  aspirin (Swelling)  penicillin (Short breath)  penicillins (Swelling)  shellfish (Unknown)    Intolerances        PAIN MEDICATIONS:  cyclobenzaprine 10 milliGRAM(s) Oral three times a day PRN  QUEtiapine 200 milliGRAM(s) Oral daily      Heme:  clopidogrel Tablet 75 milliGRAM(s) Oral daily  heparin  Injectable 5000 Unit(s) SubCutaneous every 8 hours    Antibiotics:    Cardiovascular:  amLODIPine   Tablet 5 milliGRAM(s) Oral daily  furosemide    Tablet 40 milliGRAM(s) Oral daily    GI:  polyethylene glycol 3350 17 Gram(s) Oral daily    Endocrine:  insulin glargine Injectable (LANTUS) 10 Unit(s) SubCutaneous at bedtime  insulin lispro (HumaLOG) corrective regimen sliding scale   SubCutaneous Before meals and at bedtime  simvastatin 40 milliGRAM(s) Oral at bedtime    All Other Medications:  ascorbic acid 500 milliGRAM(s) Oral daily  chlorhexidine 2% Cloths 1 Application(s) Topical daily  epoetin cyndie Injectable 60249 Unit(s) IV Push <User Schedule>  nicotine -   7 mG/24Hr(s) Patch 1 patch Transdermal daily  vitamin E 400 International Unit(s) Oral at bedtime      REVIEW OF SYSTEMS:  not obtained due to mentation    Vital Signs Last 24 Hrs  T(C): 36.8 (26 Mar 2019 08:33), Max: 37 (26 Mar 2019 00:44)  T(F): 98.2 (26 Mar 2019 08:33), Max: 98.6 (26 Mar 2019 00:44)  HR: 102 (26 Mar 2019 08:33) (92 - 105)  BP: 164/96 (26 Mar 2019 08:33) (103/113 - 181/99)  BP(mean): --  RR: 20 (26 Mar 2019 08:33) (18 - 20)  SpO2: 100% (26 Mar 2019 08:33) (94% - 100%)    PAIN SCORE:         SCALE USED: (1-10 VNRS)             PHYSICAL EXAM:    GENERAL: NAD, asleep  NERVOUS SYSTEM:  Alert & Oriented X3,   CHEST/LUNG:  No rales, rhonchi, wheezing, or rubs  HEART: Regular rate and rhythm; No murmurs, rubs, or gallops  ABDOMEN: obese nontender  EXTREMITIES:  + le edema      LABS:                          8.1    12.36 )-----------( 350      ( 26 Mar 2019 14:46 )             26.8     03-26    136  |  99  |  69<H>  ----------------------------<  151<H>  5.8<H>   |  28  |  11.80<H>    Ca    8.0<L>      26 Mar 2019 14:46  Phos  7.5     03-25  Mg     2.4     03-26    TPro  6.7  /  Alb  2.5<L>  /  TBili  0.3  /  DBili  x   /  AST  16  /  ALT  25  /  AlkPhos  107  03-26          RADIOLOGY:    Drug Screen:        RECOMMENDATIONS    [ ]  [place]  Reviewed and Copied to Chart

## 2019-03-26 NOTE — PROGRESS NOTE ADULT - PROBLEM SELECTOR PLAN 2
Avoid nephrotoxic medications  PTH level elevated likely due to secondary hyperparathyroidism  Dialysis as per schedule.

## 2019-03-26 NOTE — DISCHARGE NOTE PROVIDER - NSDCCPCAREPLAN_GEN_ALL_CORE_FT
PRINCIPAL DISCHARGE DIAGNOSIS  Diagnosis: Cramp and spasm  Assessment and Plan of Treatment:       SECONDARY DISCHARGE DIAGNOSES  Diagnosis: End-stage renal disease (ESRD)  Assessment and Plan of Treatment:     Diagnosis: Hypertension  Assessment and Plan of Treatment: PRINCIPAL DISCHARGE DIAGNOSIS  Diagnosis: Cramp and spasm  Assessment and Plan of Treatment: Your spasm is likely due to fluid imbalance. You shoiudl continue your medication as above. You should consume diet with low salt. We recommend you to follow up with nephrology and primary care doctor.      SECONDARY DISCHARGE DIAGNOSES  Diagnosis: End-stage renal disease (ESRD)  Assessment and Plan of Treatment: You had dialysis as per schedule at the hospital.  You should avoid nephrotoxic medications like ibuprofen.  You should follow up with nephrology.    Diagnosis: Hypertension  Assessment and Plan of Treatment: You should continue your medication as above. You should consume low salt diet like fruits and vegetables. You should monitor your blood pressure and follow up with primary care provider.    Diagnosis: Diabetes mellitus  Assessment and Plan of Treatment: You should continue your medication as above. You should consume low carb diet. You should monitor your blood glucose and follow up with primary care provider PRINCIPAL DISCHARGE DIAGNOSIS  Diagnosis: Cramp and spasm  Assessment and Plan of Treatment: Your spasm is likely due to fluid imbalance. You should continue your medication as above. You should consume diet with low salt. We recommend you to follow up with nephrology and primary care doctor.      SECONDARY DISCHARGE DIAGNOSES  Diagnosis: End-stage renal disease (ESRD)  Assessment and Plan of Treatment: You had dialysis as per schedule at the hospital.  You should avoid nephrotoxic medications like ibuprofen.  You should follow up with nephrology.    Diagnosis: Hypertension  Assessment and Plan of Treatment: You should continue your medication as above. You should consume low salt diet like fruits and vegetables. You should monitor your blood pressure and follow up with primary care provider.    Diagnosis: Shortness of breath  Assessment and Plan of Treatment: You have shortness of breath likely dure to obesity hyoiventilation. We recommend you to follow up with pulmonary.    Diagnosis: Diabetes mellitus  Assessment and Plan of Treatment: You should continue your medication as above. You should consume low carb diet. You should monitor your blood glucose and follow up with primary care provider

## 2019-03-26 NOTE — DISCHARGE NOTE PROVIDER - HOSPITAL COURSE
Patient is a38 Male with PMH of Type 2 DM, Migraine, ESRD on HD for 3 years (left arm graft, TTS), Morbid obesity, HTN, Chr dyspnea (uses home O2 prn) who presented to hospital for acute onset of Left arm and leg cramps . Pt has been having these upper extremity cramps for few months but this time it also involved his leg. Cramping is intermittent and was associated with severe pain. According to patient, it doesn't happen during hospital dialysis when we take out less fluids as compared to outpatient clinic dialysis when they try to take double the amount of fluids.     Patient was hemodynamically stable at ED. Mildly hypertensive and tachycardic. Labs showed megaloblastic anemia and leukocytosis. Patient was started on Flexeril, vitamin E. Nephrology was consulted. Robaxin was given for cramp. Pt had dialysis as per his schedule.    Patient is stable for discharge. Case has been discussed with the attending. This is just a summary of the case. For further information please refer to pt. chart document. Patient is a38 Male with PMH of Type 2 DM, Migraine, ESRD on HD for 3 years (left arm graft, TTS), Morbid obesity, HTN, Chr dyspnea (uses home O2 prn) who presented to hospital for acute onset of Left arm and leg cramps . Pt has been having these upper extremity cramps for few months but this time it also involved his leg. Cramping is intermittent and was associated with severe pain. According to patient, it doesn't happen during hospital dialysis when we take out less fluids as compared to outpatient clinic dialysis when they try to take double the amount of fluids.     Patient was hemodynamically stable at ED. Mildly hypertensive and tachycardic. Labs showed megaloblastic anemia and leukocytosis. Patient was started on Flexeril, vitamin E. Nephrology was consulted. Robaxin was given for cramp. Pt had dialysis as per his schedule. His symptom has improved.    Patient is stable for discharge. Case has been discussed with the attending. This is just a summary of the case. For further information please refer to pt. chart document. Patient is a38 Male with PMH of Type 2 DM, Migraine, ESRD on HD for 3 years (left arm graft, TTS), Morbid obesity, HTN, Chr dyspnea (uses home O2 prn) who presented to hospital for acute onset of Left arm and leg cramps . Pt has been having these upper extremity cramps for few months but this time it also involved his leg. Cramping is intermittent and was associated with severe pain. According to patient, it doesn't happen during hospital dialysis when we take out less fluids as compared to outpatient clinic dialysis when they try to take double the amount of fluids.     Patient was hemodynamically stable at ED. Mildly hypertensive and tachycardic. Labs showed megaloblastic anemia and leukocytosis. Patient was started on Flexeril, vitamin E. Nephrology was consulted. Robaxin was given for cramp. Pt had dialysis as per his schedule. He had an episode of fever. Blood culture was negative. Urinalysis was normal. His symptom has improved.    Patient is stable for discharge. Case has been discussed with the attending. This is just a summary of the case. For further information please refer to pt. chart document.

## 2019-03-26 NOTE — PROGRESS NOTE ADULT - ASSESSMENT
# ESRD HD today AM, With  NA modelling 145<,138.  conservative ultrafiltration and   Robaxin pre-HD  # Anemia of CKD. Procrit on HD  # HTN controlled.  # high IDWG discussed compliance with salt and fluid restriction re-inforced.

## 2019-03-26 NOTE — PROGRESS NOTE ADULT - SUBJECTIVE AND OBJECTIVE BOX
[facility] : Progress Note :: [phone], (office [phone]),   Dr La / Dr Hui / Dr Barber / Dr Villalobos / Dr Hang CASTELLANO / Dr Mehta / Dr Sanchez / Dr Alber dumont  _____________________________________________________________________________________________    still asking for opioid medications for cramping of the legs.    aspirin (Short breath)  aspirin (Swelling)  penicillin (Short breath)  penicillins (Swelling)  shellfish (Unknown)    Hospital Medications:   MEDICATIONS  (STANDING):  amLODIPine   Tablet 5 milliGRAM(s) Oral daily  ascorbic acid 500 milliGRAM(s) Oral daily  chlorhexidine 2% Cloths 1 Application(s) Topical daily  clopidogrel Tablet 75 milliGRAM(s) Oral daily  epoetin cyndie Injectable 58147 Unit(s) IV Push <User Schedule>  furosemide    Tablet 40 milliGRAM(s) Oral daily  heparin  Injectable 5000 Unit(s) SubCutaneous every 8 hours  insulin glargine Injectable (LANTUS) 10 Unit(s) SubCutaneous at bedtime  insulin lispro (HumaLOG) corrective regimen sliding scale   SubCutaneous Before meals and at bedtime  nicotine -   7 mG/24Hr(s) Patch 1 patch Transdermal daily  polyethylene glycol 3350 17 Gram(s) Oral daily  QUEtiapine 200 milliGRAM(s) Oral daily  simvastatin 40 milliGRAM(s) Oral at bedtime  vitamin E 400 International Unit(s) Oral at bedtime        VITALS:  T(F): 98.2 (03-26-19 @ 08:33), Max: 98.6 (03-26-19 @ 00:44)  HR: 102 (03-26-19 @ 08:33)  BP: 164/96 (03-26-19 @ 08:33)  RR: 20 (03-26-19 @ 08:33)  SpO2: 100% (03-26-19 @ 08:33)  Wt(kg): --      PHYSICAL EXAM:  Constitutional: NAD  HEENT: anicteric sclera, oropharynx clear.  Neck: No JVD  Respiratory: CTAB, no wheezes, rales or rhonchi  Cardiovascular: S1, S2, RRR  Gastrointestinal: BS+, soft, NT/ND  Extremities: peripheral edema+  Neurological: A/O x 3, no focal deficits  : No CVA tenderness. No hernandez.   Vascular Access: LUEAVF with thrill and bruit    LABS:  03-25    138  |  97  |  61<H>  ----------------------------<  148<H>  4.6   |  30  |  9.41<H>    Ca    8.0<L>      25 Mar 2019 05:48  Phos  7.5     03-25  Mg     2.2     03-25    TPro  7.3  /  Alb  2.6<L>  /  TBili  0.2  /  DBili      /  AST  16  /  ALT  22  /  AlkPhos  95  03-25    Creatinine Trend: 9.41 <--                        8.7    12.75 )-----------( 409      ( 25 Mar 2019 05:48 )             28.3     Urine Studies:      RADIOLOGY & ADDITIONAL STUDIES:

## 2019-03-27 DIAGNOSIS — R50.9 FEVER, UNSPECIFIED: ICD-10-CM

## 2019-03-27 LAB
ANION GAP SERPL CALC-SCNC: 8 MMOL/L — SIGNIFICANT CHANGE UP (ref 5–17)
APPEARANCE UR: CLEAR — SIGNIFICANT CHANGE UP
BILIRUB UR-MCNC: NEGATIVE — SIGNIFICANT CHANGE UP
BUN SERPL-MCNC: 51 MG/DL — HIGH (ref 7–18)
CALCIUM SERPL-MCNC: 8.3 MG/DL — LOW (ref 8.4–10.5)
CHLORIDE SERPL-SCNC: 101 MMOL/L — SIGNIFICANT CHANGE UP (ref 96–108)
CO2 SERPL-SCNC: 27 MMOL/L — SIGNIFICANT CHANGE UP (ref 22–31)
COLOR SPEC: YELLOW — SIGNIFICANT CHANGE UP
CREAT SERPL-MCNC: 9.56 MG/DL — HIGH (ref 0.5–1.3)
DIFF PNL FLD: ABNORMAL
GLUCOSE SERPL-MCNC: 105 MG/DL — HIGH (ref 70–99)
GLUCOSE UR QL: 100 MG/DL
HBA1C BLD-MCNC: 6.3 % — HIGH (ref 4–5.6)
HCT VFR BLD CALC: 27.3 % — LOW (ref 39–50)
HGB BLD-MCNC: 8.5 G/DL — LOW (ref 13–17)
KETONES UR-MCNC: NEGATIVE — SIGNIFICANT CHANGE UP
LEUKOCYTE ESTERASE UR-ACNC: NEGATIVE — SIGNIFICANT CHANGE UP
MAGNESIUM SERPL-MCNC: 2.4 MG/DL — SIGNIFICANT CHANGE UP (ref 1.6–2.6)
MCHC RBC-ENTMCNC: 31.1 GM/DL — LOW (ref 32–36)
MCHC RBC-ENTMCNC: 32.3 PG — SIGNIFICANT CHANGE UP (ref 27–34)
MCV RBC AUTO: 103.8 FL — HIGH (ref 80–100)
NITRITE UR-MCNC: NEGATIVE — SIGNIFICANT CHANGE UP
NRBC # BLD: 0 /100 WBCS — SIGNIFICANT CHANGE UP (ref 0–0)
PH UR: 8 — SIGNIFICANT CHANGE UP (ref 5–8)
PHOSPHATE SERPL-MCNC: 8.2 MG/DL — HIGH (ref 2.5–4.5)
PLATELET # BLD AUTO: 321 K/UL — SIGNIFICANT CHANGE UP (ref 150–400)
POTASSIUM SERPL-MCNC: 4.9 MMOL/L — SIGNIFICANT CHANGE UP (ref 3.5–5.3)
POTASSIUM SERPL-SCNC: 4.9 MMOL/L — SIGNIFICANT CHANGE UP (ref 3.5–5.3)
PROT UR-MCNC: 500 MG/DL
RBC # BLD: 2.63 M/UL — LOW (ref 4.2–5.8)
RBC # FLD: 14.9 % — HIGH (ref 10.3–14.5)
SODIUM SERPL-SCNC: 136 MMOL/L — SIGNIFICANT CHANGE UP (ref 135–145)
SP GR SPEC: 1.01 — SIGNIFICANT CHANGE UP (ref 1.01–1.02)
UROBILINOGEN FLD QL: NEGATIVE — SIGNIFICANT CHANGE UP
WBC # BLD: 8.63 K/UL — SIGNIFICANT CHANGE UP (ref 3.8–10.5)
WBC # FLD AUTO: 8.63 K/UL — SIGNIFICANT CHANGE UP (ref 3.8–10.5)

## 2019-03-27 PROCEDURE — 99232 SBSQ HOSP IP/OBS MODERATE 35: CPT

## 2019-03-27 PROCEDURE — 99232 SBSQ HOSP IP/OBS MODERATE 35: CPT | Mod: GC

## 2019-03-27 PROCEDURE — 71045 X-RAY EXAM CHEST 1 VIEW: CPT | Mod: 26

## 2019-03-27 RX ORDER — METHOCARBAMOL 500 MG/1
1500 TABLET, FILM COATED ORAL EVERY 6 HOURS
Qty: 0 | Refills: 0 | Status: DISCONTINUED | OUTPATIENT
Start: 2019-03-27 | End: 2019-03-28

## 2019-03-27 RX ORDER — NICOTINE POLACRILEX 2 MG
1 GUM BUCCAL DAILY
Qty: 0 | Refills: 0 | Status: DISCONTINUED | OUTPATIENT
Start: 2019-03-27 | End: 2019-03-28

## 2019-03-27 RX ORDER — METHOCARBAMOL 500 MG/1
1500 TABLET, FILM COATED ORAL EVERY 6 HOURS
Qty: 0 | Refills: 0 | Status: DISCONTINUED | OUTPATIENT
Start: 2019-03-27 | End: 2019-03-27

## 2019-03-27 RX ADMIN — Medication 650 MILLIGRAM(S): at 01:50

## 2019-03-27 RX ADMIN — CLOPIDOGREL BISULFATE 75 MILLIGRAM(S): 75 TABLET, FILM COATED ORAL at 12:14

## 2019-03-27 RX ADMIN — HEPARIN SODIUM 5000 UNIT(S): 5000 INJECTION INTRAVENOUS; SUBCUTANEOUS at 06:19

## 2019-03-27 RX ADMIN — INSULIN GLARGINE 10 UNIT(S): 100 INJECTION, SOLUTION SUBCUTANEOUS at 22:11

## 2019-03-27 RX ADMIN — METHOCARBAMOL 1500 MILLIGRAM(S): 500 TABLET, FILM COATED ORAL at 12:53

## 2019-03-27 RX ADMIN — AMLODIPINE BESYLATE 5 MILLIGRAM(S): 2.5 TABLET ORAL at 06:19

## 2019-03-27 RX ADMIN — Medication 500 MILLIGRAM(S): at 12:13

## 2019-03-27 RX ADMIN — Medication 1 PATCH: at 19:17

## 2019-03-27 RX ADMIN — Medication 1 PATCH: at 12:14

## 2019-03-27 RX ADMIN — SIMVASTATIN 40 MILLIGRAM(S): 20 TABLET, FILM COATED ORAL at 22:11

## 2019-03-27 RX ADMIN — CYCLOBENZAPRINE HYDROCHLORIDE 10 MILLIGRAM(S): 10 TABLET, FILM COATED ORAL at 06:20

## 2019-03-27 RX ADMIN — QUETIAPINE FUMARATE 200 MILLIGRAM(S): 200 TABLET, FILM COATED ORAL at 12:14

## 2019-03-27 RX ADMIN — Medication 40 MILLIGRAM(S): at 06:19

## 2019-03-27 RX ADMIN — GABAPENTIN 300 MILLIGRAM(S): 400 CAPSULE ORAL at 12:18

## 2019-03-27 RX ADMIN — Medication 1: at 22:12

## 2019-03-27 RX ADMIN — POLYETHYLENE GLYCOL 3350 17 GRAM(S): 17 POWDER, FOR SOLUTION ORAL at 12:14

## 2019-03-27 RX ADMIN — METHOCARBAMOL 1500 MILLIGRAM(S): 500 TABLET, FILM COATED ORAL at 22:13

## 2019-03-27 RX ADMIN — HEPARIN SODIUM 5000 UNIT(S): 5000 INJECTION INTRAVENOUS; SUBCUTANEOUS at 22:13

## 2019-03-27 RX ADMIN — Medication 650 MILLIGRAM(S): at 00:35

## 2019-03-27 NOTE — PHARMACOTHERAPY INTERVENTION NOTE - COMMENTS
39y/o M, BMI=43.9, "extensively smoking" was put on nicotine patch 7mcg; the recommendation is to evaluate his smoking habits and increase patch strength to 21mcg (if 10 cigarets/day and due to over wt) as might have cravings

## 2019-03-27 NOTE — PROGRESS NOTE ADULT - PROBLEM SELECTOR PROBLEM 4
Please let patient know okay to take lovastatin as previously taking and watch diet.  I would like patient to recheck fasting lipid profile in 3 months.  Orders placed.  If not better we will need to change lovastatin to a different statin   DM (diabetes mellitus)

## 2019-03-27 NOTE — PROGRESS NOTE ADULT - ASSESSMENT
Patient is a 39 y/o  Male with PMH of Type 2 DM, Migraine, ESRD on HD for 3 yrs (left arm graft, TTS), Morbid obesity, HTN, Chr dyspnea (uses home O2 prn) presented to hospital for acute onset of Left arm and leg cramps started last night 12am. Admitted to medicine for dialysis induced Cramps.

## 2019-03-27 NOTE — PROGRESS NOTE ADULT - PROBLEM SELECTOR PLAN 1
- dc home today  - no opioids  - no nsaids  - tylenol po prn  - flexeril 10mg po q 12 hours  - gabapentin 300mg po daily - renal dose

## 2019-03-27 NOTE — PROGRESS NOTE ADULT - PROBLEM SELECTOR PLAN 1
Pt has spasmodic pain and cramps likely from fluid imbalance  Continue Flexeril, gabapentin  Started on Robaxin as per nephrology recommendations  F/u serum carnitine level  Avoid opoids  Pain management consult

## 2019-03-27 NOTE — CHART NOTE - NSCHARTNOTEFT_GEN_A_CORE
EVENT: Fever, T 101 overnight    OBJECTIVE:  Vital Signs Last 24 Hrs  T(C): 37.5 (27 Mar 2019 03:23), Max: 38.3 (27 Mar 2019 00:31)  T(F): 99.5 (27 Mar 2019 03:23), Max: 101 (27 Mar 2019 00:31)  HR: 103 (27 Mar 2019 00:31) (102 - 109)  BP: 152/90 (27 Mar 2019 00:31) (152/90 - 166/94)  BP(mean): --  RR: 18 (27 Mar 2019 00:31) (17 - 20)  SpO2: 95% (27 Mar 2019 00:31) (94% - 100%)    FOCUSED PHYSICAL EXAM:  Neuro: Awake, A&Ox3  Lug: CTA jeremy  Skin: Warm to touch    LABS:                        8.1    12.36 )-----------( 350      ( 26 Mar 2019 14:46 )             26.8     03-26    136  |  99  |  69<H>  ----------------------------<  151<H>  5.8<H>   |  28  |  11.80<H>    Ca    8.0<L>      26 Mar 2019 14:46  Phos  9.6     03-26  Mg     2.4     03-26    TPro  6.7  /  Alb  2.5<L>  /  TBili  0.3  /  DBili  x   /  AST  16  /  ALT  25  /  AlkPhos  107  03-26      ASSESSMENT:  HPI:  38 Male with PMH of Type 2 DM, Migraine, ESRD on HD for 3 yrs (left arm graft, TTS), Morbid obesity, HTN, Chr dyspnea (uses home O2 prn) presented to hospital for acute onset of Left arm and leg cramps started last night 12am. Pt has been having these upper extremity cramps for few months but this time it also involved his leg. Cramping is intermittent and is associated with severe pain. He is not able to unlock his Lt hand. According to patient, it doesn't happen during hospital dialysis when we take out less fluids as compared to outpatient clinic dialysis when they try to take double the amount of fluids. Tylenol and Gabapentin doesn't help. Pt is unsure why he is on dialysis and never got biopsy. He is constipated for 3 days. He recently started drinking alcohol due to stress in life. No fever, focal weakness, chest pain, new shortness of breath, abdominal pain. Despite being on dialysis, he still makes urine.     FH: Sister has RA and Lupus  SH: Smokes and uses other drugs often. Lives with family.     ED Course: Hemodynamically stable. Mildly hypertensive and tachycardic. Labs showed megaloblastic anemia and leukocytosis. Pt was given morphine, tylenol, valium for upper extremity pain. Pt was given 1.5 liter bolus. (25 Mar 2019 11:46)      PLAN: Urinalysis, CXR, Blood Bcx ordered. Give Tylenol 650mg x 1 dose.    FOLLOW UP / RESULT: Endorse report to day staff to f/u on results of CXR, UA & BCx.

## 2019-03-27 NOTE — PROGRESS NOTE ADULT - SUBJECTIVE AND OBJECTIVE BOX
pt s- new complaints, persently no cramping or paresthesias left arm  ICU Vital Signs Last 24 Hrs  T(C): 36.8 (27 Mar 2019 09:45), Max: 38.3 (27 Mar 2019 00:31)  T(F): 98.2 (27 Mar 2019 09:45), Max: 101 (27 Mar 2019 00:31)  HR: 98 (27 Mar 2019 09:45) (90 - 109)  BP: 167/95 (27 Mar 2019 09:45) (152/90 - 195/93)  BP(mean): --  ABP: --  ABP(mean): --  RR: 20 (27 Mar 2019 09:45) (17 - 20)  SpO2: 99% (27 Mar 2019 09:45) (94% - 100%)    left arm: avf with thril, extremity warm, nvi distal extremity                              8.5    8.63  )-----------( 321      ( 27 Mar 2019 07:00 )             27.3     03-27    136  |  101  |  51<H>  ----------------------------<  105<H>  4.9   |  27  |  9.56<H>    Ca    8.3<L>      27 Mar 2019 07:00  Phos  8.2     03-27  Mg     2.4     03-27    TPro  6.7  /  Alb  2.5<L>  /  TBili  0.3  /  DBili  x   /  AST  16  /  ALT  25  /  AlkPhos  107  03-26

## 2019-03-27 NOTE — PROGRESS NOTE ADULT - PROBLEM SELECTOR PLAN 2
Pt had fever of 101 F last night. Afebrile since then.  Urinalysis was normal. Chest X-ray was unremarkable.  F/u Blood culture

## 2019-03-27 NOTE — PROGRESS NOTE ADULT - SUBJECTIVE AND OBJECTIVE BOX
PGY 1 Note discussed with supervising resident and primary attending    Patient is a 38y old  Male who presents with a chief complaint of Left arm and leg cramps (27 Mar 2019 14:58)      INTERVAL HPI/OVERNIGHT EVENTS: Pt seen and examined at bedside. pt has no new complains.    MEDICATIONS  (STANDING):  amLODIPine   Tablet 5 milliGRAM(s) Oral daily  ascorbic acid 500 milliGRAM(s) Oral daily  chlorhexidine 2% Cloths 1 Application(s) Topical daily  clopidogrel Tablet 75 milliGRAM(s) Oral daily  epoetin cyndie Injectable 85937 Unit(s) IV Push <User Schedule>  furosemide    Tablet 40 milliGRAM(s) Oral daily  gabapentin 300 milliGRAM(s) Oral daily  heparin  Injectable 5000 Unit(s) SubCutaneous every 8 hours  insulin glargine Injectable (LANTUS) 10 Unit(s) SubCutaneous at bedtime  insulin lispro (HumaLOG) corrective regimen sliding scale   SubCutaneous Before meals and at bedtime  nicotine - 21 mG/24Hr(s) Patch 1 patch Transdermal daily  QUEtiapine 200 milliGRAM(s) Oral daily  simvastatin 40 milliGRAM(s) Oral at bedtime  vitamin E 400 International Unit(s) Oral at bedtime    MEDICATIONS  (PRN):  acetaminophen   Tablet .. 650 milliGRAM(s) Oral every 6 hours PRN Temp greater or equal to 38C (100.4F)  cyclobenzaprine 10 milliGRAM(s) Oral three times a day PRN Muscle Spasm  methocarbamol 1500 milliGRAM(s) Oral every 6 hours PRN Muscle Spasm      __________________________________________________  REVIEW OF SYSTEMS:    CONSTITUTIONAL: No fever,   EYES: no acute visual disturbances  NECK: No pain or stiffness  RESPIRATORY: No cough; No shortness of breath  CARDIOVASCULAR: No chest pain, no palpitations  GASTROINTESTINAL: No pain. No nausea or vomiting; No diarrhea   NEUROLOGICAL: No headache or numbness, no tremors  MUSCULOSKELETAL: No joint pain, no muscle pain  GENITOURINARY: no dysuria, no frequency, no hesitancy  PSYCHIATRY: no depression , no anxiety  ALL OTHER  ROS negative        Vital Signs Last 24 Hrs  T(C): 36.1 (27 Mar 2019 15:52), Max: 38.3 (27 Mar 2019 00:31)  T(F): 97 (27 Mar 2019 15:52), Max: 101 (27 Mar 2019 00:31)  HR: 93 (27 Mar 2019 15:52) (90 - 109)  BP: 179/91 (27 Mar 2019 15:52) (152/90 - 195/93)  BP(mean): --  RR: 20 (27 Mar 2019 15:52) (17 - 20)  SpO2: 96% (27 Mar 2019 15:52) (94% - 99%)    ________________________________________________  PHYSICAL EXAM:  GENERAL: NAD  HEENT: Normocephalic;  conjunctivae and sclerae clear; moist mucous membranes;   NECK : supple  CHEST/LUNG: Clear to auscultation bilaterally with good air entry   HEART: S1 S2  regular; no murmurs, gallops or rubs  ABDOMEN: Soft, Nontender, Nondistended; Bowel sounds present  EXTREMITIES: no cyanosis; no edema; no calf tenderness  SKIN: warm and dry; no rash  NERVOUS SYSTEM:  Awake and alert; Oriented  to place, person and time ; no new deficits    _________________________________________________  LABS:                        8.5    8.63  )-----------( 321      ( 27 Mar 2019 07:00 )             27.3     03    136  |  101  |  51<H>  ----------------------------<  105<H>  4.9   |  27  |  9.56<H>    Ca    8.3<L>      27 Mar 2019 07:00  Phos  8.2       Mg     2.4         TPro  6.7  /  Alb  2.5<L>  /  TBili  0.3  /  DBili  x   /  AST  16  /  ALT  25  /  AlkPhos  107        Urinalysis Basic - ( 27 Mar 2019 07:07 )    Color: Yellow / Appearance: Clear / S.015 / pH: x  Gluc: x / Ketone: Negative  / Bili: Negative / Urobili: Negative   Blood: x / Protein: 500 mg/dL / Nitrite: Negative   Leuk Esterase: Negative / RBC: 2-5 /HPF / WBC 0-2 /HPF   Sq Epi: x / Non Sq Epi: x / Bacteria: x      CAPILLARY BLOOD GLUCOSE      POCT Blood Glucose.: 111 mg/dL (27 Mar 2019 16:42)  POCT Blood Glucose.: 118 mg/dL (27 Mar 2019 12:30)  POCT Blood Glucose.: 113 mg/dL (27 Mar 2019 08:17)  POCT Blood Glucose.: 170 mg/dL (26 Mar 2019 21:48)          Plan of care was discussed with patient and /or primary care giver; all questions and concerns were addressed and care was aligned with patient's wishes.

## 2019-03-27 NOTE — PROGRESS NOTE ADULT - SUBJECTIVE AND OBJECTIVE BOX
[facility] : Progress Note :: [phone], (office [phone]),   Dr La / Dr Hui / Dr Barber / Dr Villalobos / Dr Hang CASTELLANO / Dr Mehta / Dr Sanchez / Dr Alber dumont  _____________________________________________________________________________________________  Patient is a 38y Male with ESRD. s/p HD yesterday. had cramps but improved with robaxin.    aspirin (Short breath)  aspirin (Swelling)  penicillin (Short breath)  penicillins (Swelling)  shellfish (Unknown)    Hospital Medications:   MEDICATIONS  (STANDING):  amLODIPine   Tablet 5 milliGRAM(s) Oral daily  ascorbic acid 500 milliGRAM(s) Oral daily  chlorhexidine 2% Cloths 1 Application(s) Topical daily  clopidogrel Tablet 75 milliGRAM(s) Oral daily  epoetin cyndie Injectable 29102 Unit(s) IV Push <User Schedule>  furosemide    Tablet 40 milliGRAM(s) Oral daily  gabapentin 300 milliGRAM(s) Oral daily  heparin  Injectable 5000 Unit(s) SubCutaneous every 8 hours  insulin glargine Injectable (LANTUS) 10 Unit(s) SubCutaneous at bedtime  insulin lispro (HumaLOG) corrective regimen sliding scale   SubCutaneous Before meals and at bedtime  nicotine - 21 mG/24Hr(s) Patch 1 patch Transdermal daily  QUEtiapine 200 milliGRAM(s) Oral daily  simvastatin 40 milliGRAM(s) Oral at bedtime  vitamin E 400 International Unit(s) Oral at bedtime        VITALS:  T(F): 98.2 (19 @ 09:45), Max: 101 (19 @ 00:31)  HR: 98 (19 @ 09:45)  BP: 167/95 (19 @ 09:45)  RR: 20 (19 @ 09:45)  SpO2: 99% (19 @ 09:45)  Wt(kg): --      PHYSICAL EXAM:  Constitutional: obese  HEENT: anicteric sclera, oropharynx clear  Neck: No JVD  Respiratory: CTAB, no wheezes, rales or rhonchi  Cardiovascular: S1, S2, RRR  Gastrointestinal: BS+, soft, NT/ND  Extremities: bilateral peripheral edema  Neurological: A/O x 3, no focal deficits  Vascular Access: LUEAVF with thrill and bruit    LABS:      136  |  101  |  51<H>  ----------------------------<  105<H>  4.9   |  27  |  9.56<H>    Ca    8.3<L>      27 Mar 2019 07:00  Phos  8.2       Mg     2.4         TPro  6.7  /  Alb  2.5<L>  /  TBili  0.3  /  DBili      /  AST  16  /  ALT  25  /  AlkPhos  107      Creatinine Trend: 9.56 <--, 11.80 <--, 9.41 <--                        8.5    8.63  )-----------( 321      ( 27 Mar 2019 07:00 )             27.3     Urine Studies:  Urinalysis Basic - ( 27 Mar 2019 07:07 )    Color: Yellow / Appearance: Clear / S.015 / pH:   Gluc:  / Ketone: Negative  / Bili: Negative / Urobili: Negative   Blood:  / Protein: 500 mg/dL / Nitrite: Negative   Leuk Esterase: Negative / RBC: 2-5 /HPF / WBC 0-2 /HPF   Sq Epi:  / Non Sq Epi:  / Bacteria:         RADIOLOGY & ADDITIONAL STUDIES:

## 2019-03-27 NOTE — PROGRESS NOTE ADULT - ASSESSMENT
# ESRD s/p HD yesterday with  NA modelling 145<,138.,conservative ultrafiltration and   Robaxin pre-HD. Low grade overnight. Only complains of a slight cough. blood cultures ordered and pending  suggest robaxin 1.5 grams Q6 PRN for cramping  # Anemia of CKD. Procrit on HD  # HTN controlled.  # high IDWG discussed compliance with salt and fluid restriction re-inforced.  D/c planning.

## 2019-03-27 NOTE — PROGRESS NOTE ADULT - SUBJECTIVE AND OBJECTIVE BOX
NP Note discussed with  Primary Attending    CC:   " I have leg cramps"    Patient is a 38y old  Male who presents with a chief complaint of Left arm and leg cramps (27 Mar 2019 11:33).  38 year old male lying in bed, asleep.  Pt is snoring.  Pt is awoken after stimulation.  Pt states that he is comfortable and has no complaints.  Pt is leaving today.      INTERVAL HPI/OVERNIGHT EVENTS: no new complaints    MEDICATIONS  (STANDING):  amLODIPine   Tablet 5 milliGRAM(s) Oral daily  ascorbic acid 500 milliGRAM(s) Oral daily  chlorhexidine 2% Cloths 1 Application(s) Topical daily  clopidogrel Tablet 75 milliGRAM(s) Oral daily  epoetin cyndie Injectable 98509 Unit(s) IV Push <User Schedule>  furosemide    Tablet 40 milliGRAM(s) Oral daily  gabapentin 300 milliGRAM(s) Oral daily  heparin  Injectable 5000 Unit(s) SubCutaneous every 8 hours  insulin glargine Injectable (LANTUS) 10 Unit(s) SubCutaneous at bedtime  insulin lispro (HumaLOG) corrective regimen sliding scale   SubCutaneous Before meals and at bedtime  nicotine - 21 mG/24Hr(s) Patch 1 patch Transdermal daily  QUEtiapine 200 milliGRAM(s) Oral daily  simvastatin 40 milliGRAM(s) Oral at bedtime  vitamin E 400 International Unit(s) Oral at bedtime    MEDICATIONS  (PRN):  acetaminophen   Tablet .. 650 milliGRAM(s) Oral every 6 hours PRN Temp greater or equal to 38C (100.4F)  cyclobenzaprine 10 milliGRAM(s) Oral three times a day PRN Muscle Spasm  methocarbamol 1500 milliGRAM(s) Oral every 6 hours PRN Muscle Spasm      __________________________________________________  REVIEW OF SYSTEMS:    CONSTITUTIONAL: No fever  RESPIRATORY: No cough; No shortness of breath  CARDIOVASCULAR: No chest pain, no palpitations  GASTROINTESTINAL: No pain. No nausea or vomiting; No diarrhea   NEUROLOGICAL: No headache or numbness, no tremors  MUSCULOSKELETAL: + cramps - legs, arms  GENITOURINARY: esrd- on hd, pt still urinates         Vital Signs Last 24 Hrs  T(C): 36.8 (27 Mar 2019 09:45), Max: 38.3 (27 Mar 2019 00:31)  T(F): 98.2 (27 Mar 2019 09:45), Max: 101 (27 Mar 2019 00:31)  HR: 98 (27 Mar 2019 09:45) (90 - 109)  BP: 167/95 (27 Mar 2019 09:45) (152/90 - 195/93)  BP(mean): --  RR: 20 (27 Mar 2019 09:45) (17 - 20)  SpO2: 99% (27 Mar 2019 09:45) (94% - 99%)    ________________________________________________  PHYSICAL EXAM:  GENERAL: NAD  CHEST/LUNG: Clear to auscultation bilaterally with good air entry   HEART: S1 S2  regular; no murmurs, gallops or rubs  ABDOMEN: obese nontender  EXTREMITIES: no cyanosis; no edema; no calf tenderness  SKIN: warm and dry; no rash  NERVOUS SYSTEM:  Awake and alert; Oriented  to place, person and time ; no new deficits  MUSCULOSKELETAL: + decreased rom   _________________________________________________  LABS:                        8.5    8.63  )-----------( 321      ( 27 Mar 2019 07:00 )             27.3     03-    136  |  101  |  51<H>  ----------------------------<  105<H>  4.9   |  27  |  9.56<H>    Ca    8.3<L>      27 Mar 2019 07:00  Phos  8.2       Mg     2.4         TPro  6.7  /  Alb  2.5<L>  /  TBili  0.3  /  DBili  x   /  AST  16  /  ALT  25  /  AlkPhos  107  03-26      Urinalysis Basic - ( 27 Mar 2019 07:07 )    Color: Yellow / Appearance: Clear / S.015 / pH: x  Gluc: x / Ketone: Negative  / Bili: Negative / Urobili: Negative   Blood: x / Protein: 500 mg/dL / Nitrite: Negative   Leuk Esterase: Negative / RBC: 2-5 /HPF / WBC 0-2 /HPF   Sq Epi: x / Non Sq Epi: x / Bacteria: x      CAPILLARY BLOOD GLUCOSE      POCT Blood Glucose.: 118 mg/dL (27 Mar 2019 12:30)  POCT Blood Glucose.: 113 mg/dL (27 Mar 2019 08:17)  POCT Blood Glucose.: 170 mg/dL (26 Mar 2019 21:48)  POCT Blood Glucose.: 128 mg/dL (26 Mar 2019 16:33)        RADIOLOGY & ADDITIONAL TESTS:    Imaging Personally Reviewed:  YES/NO    Consultant(s) Notes Reviewed:   YES/ No    Care Discussed with Consultants :     Plan of care was discussed with patient and /or primary care giver; all questions and concerns were addressed and care was aligned with patient's wishes.

## 2019-03-28 ENCOUNTER — TRANSCRIPTION ENCOUNTER (OUTPATIENT)
Age: 39
End: 2019-03-28

## 2019-03-28 VITALS
OXYGEN SATURATION: 96 % | TEMPERATURE: 98 F | SYSTOLIC BLOOD PRESSURE: 152 MMHG | RESPIRATION RATE: 20 BRPM | DIASTOLIC BLOOD PRESSURE: 86 MMHG | HEART RATE: 95 BPM

## 2019-03-28 LAB
ANION GAP SERPL CALC-SCNC: 7 MMOL/L — SIGNIFICANT CHANGE UP (ref 5–17)
BUN SERPL-MCNC: 67 MG/DL — HIGH (ref 7–18)
CALCIUM SERPL-MCNC: 8.4 MG/DL — SIGNIFICANT CHANGE UP (ref 8.4–10.5)
CHLORIDE SERPL-SCNC: 101 MMOL/L — SIGNIFICANT CHANGE UP (ref 96–108)
CO2 SERPL-SCNC: 26 MMOL/L — SIGNIFICANT CHANGE UP (ref 22–31)
CREAT SERPL-MCNC: 11.8 MG/DL — HIGH (ref 0.5–1.3)
GLUCOSE SERPL-MCNC: 104 MG/DL — HIGH (ref 70–99)
MAGNESIUM SERPL-MCNC: 2.6 MG/DL — SIGNIFICANT CHANGE UP (ref 1.6–2.6)
PHOSPHATE SERPL-MCNC: 9.5 MG/DL — SIGNIFICANT CHANGE UP (ref 2.5–4.5)
POTASSIUM SERPL-MCNC: 5.9 MMOL/L — HIGH (ref 3.5–5.3)
POTASSIUM SERPL-SCNC: 5.9 MMOL/L — HIGH (ref 3.5–5.3)
SODIUM SERPL-SCNC: 134 MMOL/L — LOW (ref 135–145)

## 2019-03-28 PROCEDURE — 71045 X-RAY EXAM CHEST 1 VIEW: CPT

## 2019-03-28 PROCEDURE — 85027 COMPLETE CBC AUTOMATED: CPT

## 2019-03-28 PROCEDURE — 94640 AIRWAY INHALATION TREATMENT: CPT

## 2019-03-28 PROCEDURE — 82962 GLUCOSE BLOOD TEST: CPT

## 2019-03-28 PROCEDURE — 82607 VITAMIN B-12: CPT

## 2019-03-28 PROCEDURE — 82746 ASSAY OF FOLIC ACID SERUM: CPT

## 2019-03-28 PROCEDURE — 96375 TX/PRO/DX INJ NEW DRUG ADDON: CPT

## 2019-03-28 PROCEDURE — 83735 ASSAY OF MAGNESIUM: CPT

## 2019-03-28 PROCEDURE — 81001 URINALYSIS AUTO W/SCOPE: CPT

## 2019-03-28 PROCEDURE — 82310 ASSAY OF CALCIUM: CPT

## 2019-03-28 PROCEDURE — 83036 HEMOGLOBIN GLYCOSYLATED A1C: CPT

## 2019-03-28 PROCEDURE — 99238 HOSP IP/OBS DSCHRG MGMT 30/<: CPT

## 2019-03-28 PROCEDURE — 36415 COLL VENOUS BLD VENIPUNCTURE: CPT

## 2019-03-28 PROCEDURE — 87631 RESP VIRUS 3-5 TARGETS: CPT

## 2019-03-28 PROCEDURE — 80048 BASIC METABOLIC PNL TOTAL CA: CPT

## 2019-03-28 PROCEDURE — 96365 THER/PROPH/DIAG IV INF INIT: CPT

## 2019-03-28 PROCEDURE — 99285 EMERGENCY DEPT VISIT HI MDM: CPT | Mod: 25

## 2019-03-28 PROCEDURE — 99261: CPT

## 2019-03-28 PROCEDURE — 80053 COMPREHEN METABOLIC PANEL: CPT

## 2019-03-28 PROCEDURE — 83970 ASSAY OF PARATHORMONE: CPT

## 2019-03-28 PROCEDURE — 84100 ASSAY OF PHOSPHORUS: CPT

## 2019-03-28 PROCEDURE — 87040 BLOOD CULTURE FOR BACTERIA: CPT

## 2019-03-28 RX ORDER — VITAMIN E 100 UNIT
1 CAPSULE ORAL
Qty: 15 | Refills: 0 | OUTPATIENT
Start: 2019-03-28 | End: 2019-04-11

## 2019-03-28 RX ORDER — METHOCARBAMOL 500 MG/1
1 TABLET, FILM COATED ORAL
Qty: 20 | Refills: 0 | OUTPATIENT
Start: 2019-03-28 | End: 2019-04-01

## 2019-03-28 RX ORDER — CYCLOBENZAPRINE HYDROCHLORIDE 10 MG/1
1 TABLET, FILM COATED ORAL
Qty: 15 | Refills: 0 | OUTPATIENT
Start: 2019-03-28 | End: 2019-04-01

## 2019-03-28 RX ORDER — GABAPENTIN 400 MG/1
1 CAPSULE ORAL
Qty: 20 | Refills: 0 | OUTPATIENT
Start: 2019-03-28 | End: 2019-04-16

## 2019-03-28 RX ORDER — ASCORBIC ACID 60 MG
1 TABLET,CHEWABLE ORAL
Qty: 15 | Refills: 0
Start: 2019-03-28 | End: 2019-04-11

## 2019-03-28 RX ORDER — GABAPENTIN 400 MG/1
1 CAPSULE ORAL
Qty: 0 | Refills: 0 | COMMUNITY

## 2019-03-28 RX ADMIN — GABAPENTIN 300 MILLIGRAM(S): 400 CAPSULE ORAL at 11:55

## 2019-03-28 RX ADMIN — CYCLOBENZAPRINE HYDROCHLORIDE 10 MILLIGRAM(S): 10 TABLET, FILM COATED ORAL at 01:11

## 2019-03-28 RX ADMIN — AMLODIPINE BESYLATE 5 MILLIGRAM(S): 2.5 TABLET ORAL at 05:40

## 2019-03-28 RX ADMIN — Medication 40 MILLIGRAM(S): at 05:40

## 2019-03-28 RX ADMIN — METHOCARBAMOL 1500 MILLIGRAM(S): 500 TABLET, FILM COATED ORAL at 18:55

## 2019-03-28 RX ADMIN — HEPARIN SODIUM 5000 UNIT(S): 5000 INJECTION INTRAVENOUS; SUBCUTANEOUS at 05:40

## 2019-03-28 RX ADMIN — CLOPIDOGREL BISULFATE 75 MILLIGRAM(S): 75 TABLET, FILM COATED ORAL at 11:55

## 2019-03-28 RX ADMIN — Medication 1 PATCH: at 11:56

## 2019-03-28 RX ADMIN — CHLORHEXIDINE GLUCONATE 1 APPLICATION(S): 213 SOLUTION TOPICAL at 11:46

## 2019-03-28 RX ADMIN — Medication 1 PATCH: at 11:55

## 2019-03-28 RX ADMIN — QUETIAPINE FUMARATE 200 MILLIGRAM(S): 200 TABLET, FILM COATED ORAL at 11:55

## 2019-03-28 RX ADMIN — METHOCARBAMOL 1500 MILLIGRAM(S): 500 TABLET, FILM COATED ORAL at 08:44

## 2019-03-28 RX ADMIN — HEPARIN SODIUM 5000 UNIT(S): 5000 INJECTION INTRAVENOUS; SUBCUTANEOUS at 13:00

## 2019-03-28 RX ADMIN — ERYTHROPOIETIN 10000 UNIT(S): 10000 INJECTION, SOLUTION INTRAVENOUS; SUBCUTANEOUS at 17:48

## 2019-03-28 RX ADMIN — Medication 1 PATCH: at 08:28

## 2019-03-28 RX ADMIN — Medication 500 MILLIGRAM(S): at 11:55

## 2019-03-28 NOTE — PROGRESS NOTE ADULT - REASON FOR ADMISSION
Left arm and leg cramps

## 2019-03-28 NOTE — PROGRESS NOTE ADULT - ASSESSMENT
# ESRD with NA modelling 145<,138.,conservative ultrafiltration. afebrile.  sugest d/c home on robaxin 750mg two times per day prn  # Anemia of CKD. Procrit on HD  # HTN controlled.  # high IDWG discussed compliance with salt and fluid restriction re-inforced.  D/c planning.

## 2019-03-28 NOTE — PROGRESS NOTE ADULT - SUBJECTIVE AND OBJECTIVE BOX
[facility] : Progress Note :: [phone], (office [phone]),   Dr La / Dr uHi / Dr Barber / Dr Villalobos / Dr Hang CASTELLANO / Dr Mehta / Dr Sanchez / Dr Alber dumont  _____________________________________________________________________________________________     cramps better.    aspirin (Short breath)  aspirin (Swelling)  penicillin (Short breath)  penicillins (Swelling)  shellfish (Unknown)    Hospital Medications:   MEDICATIONS  (STANDING):  amLODIPine   Tablet 5 milliGRAM(s) Oral daily  ascorbic acid 500 milliGRAM(s) Oral daily  chlorhexidine 2% Cloths 1 Application(s) Topical daily  clopidogrel Tablet 75 milliGRAM(s) Oral daily  epoetin cyndie Injectable 79070 Unit(s) IV Push <User Schedule>  furosemide    Tablet 40 milliGRAM(s) Oral daily  gabapentin 300 milliGRAM(s) Oral daily  heparin  Injectable 5000 Unit(s) SubCutaneous every 8 hours  insulin glargine Injectable (LANTUS) 10 Unit(s) SubCutaneous at bedtime  insulin lispro (HumaLOG) corrective regimen sliding scale   SubCutaneous Before meals and at bedtime  nicotine - 21 mG/24Hr(s) Patch 1 patch Transdermal daily  QUEtiapine 200 milliGRAM(s) Oral daily  simvastatin 40 milliGRAM(s) Oral at bedtime  vitamin E 400 International Unit(s) Oral at bedtime        VITALS:  T(F): 97.8 (19 @ 07:48), Max: 97.8 (19 @ 07:48)  HR: 94 (19 @ 07:48)  BP: 156/81 (19 @ 07:48)  RR: 20 (19 @ 07:48)  SpO2: 93% (19 @ 07:48)  Wt(kg): --      PHYSICAL EXAM:  Constitutional: NAD  HEENT: anicteric sclera, oropharynx clear.  Neck: No JVD  Respiratory: CTAB, no wheezes, rales or rhonchi  Cardiovascular: S1, S2, RRR  Gastrointestinal: BS+, soft, NT/ND  Extremities: No peripheral edema  Neurological: A/O x 3, no focal deficits  Vascular Access: LUEAVF with thrill and bruit    LABS:      134<L>  |  101  |  67<H>  ----------------------------<  104<H>  5.9<H>   |  26  |  11.80<H>    Ca    8.4      28 Mar 2019 14:13  Phos  8.2       Mg     2.6           Creatinine Trend: 11.80 <--, 9.56 <--, 11.80 <--, 9.41 <--                        8.5    8.63  )-----------( 321      ( 27 Mar 2019 07:00 )             27.3     Urine Studies:  Urinalysis Basic - ( 27 Mar 2019 07:07 )    Color: Yellow / Appearance: Clear / S.015 / pH:   Gluc:  / Ketone: Negative  / Bili: Negative / Urobili: Negative   Blood:  / Protein: 500 mg/dL / Nitrite: Negative   Leuk Esterase: Negative / RBC: 2-5 /HPF / WBC 0-2 /HPF   Sq Epi:  / Non Sq Epi:  / Bacteria:         RADIOLOGY & ADDITIONAL STUDIES:

## 2019-03-28 NOTE — DISCHARGE NOTE NURSING/CASE MANAGEMENT/SOCIAL WORK - NSDCVIVACCINE_GEN_ALL_CORE_FT
Influenza , 2016/4/7 12:13 , Halle Ellsworth (RN)  Influenza , 2018/12/6 14:41 , Rhonda Kern (RN)  Influenza , 2019/3/5 16:51 , Jace Best (RN)

## 2019-03-28 NOTE — PROGRESS NOTE ADULT - ASSESSMENT
38 year old male with ESRD on HD via LUE AVF, with cramping s/p HD    - continue HD per renal  - renal and medicine follow up for management of cramping  - recommend US graft and JERRY/PVR

## 2019-03-28 NOTE — PROGRESS NOTE ADULT - ATTENDING COMMENTS
Patient was examined while in the dialysis unit.  He was asleep, and did not arouse on my examination.   Vital Signs Last 24 Hrs  T(C): 37.8 (26 Mar 2019 19:45), Max: 38.2 (26 Mar 2019 18:37)  T(F): 100 (26 Mar 2019 19:45), Max: 100.8 (26 Mar 2019 18:37)  HR: 108 (26 Mar 2019 19:20) (102 - 109)  BP: 161/98 (26 Mar 2019 19:20) (154/95 - 172/92)  BP(mean): --  RR: 17 (26 Mar 2019 19:20) (17 - 20)  SpO2: 94% (26 Mar 2019 19:20) (94% - 100%)  Lungs, clear  Cor, RRR  Ext, left hand was open and flaccid, though on dorsiflexion, there was evidence of flapping ventrally (asterixis, left hand only).                         8.1    12.36 )-----------( 350      ( 26 Mar 2019 14:46 )             26.8   03-26    136  |  99  |  69<H>  ----------------------------<  151<H>  5.8<H>   |  28  |  11.80<H>    Ca    8.0<L>      26 Mar 2019 14:46  Phos  9.6     03-26  Mg     2.4     03-26    TPro  6.7  /  Alb  2.5<L>  /  TBili  0.3  /  DBili  x   /  AST  16  /  ALT  25  /  AlkPhos  107  03-26    IMP: ESRD, stable         DM, HTN, stable         Spasm of the left hand is not evident while patient is sedated.  Plan: Taper benzodiazepines.  No opioids, also recommended by pain management.    Discharge home.  Continue dialysis as outpatient, T,Th,Sat
Patient was examined in the dialysis unit.     He is feeling much better.  Cramping has improved.     Plan:  Discharge home after dialysis.

## 2019-03-28 NOTE — PROGRESS NOTE ADULT - SUBJECTIVE AND OBJECTIVE BOX
Patient seen and examined at bedside complaining of cramping in his LLE.     Vital Signs Last 24 Hrs  T(F): 97.7 (03-27-19 @ 23:38), Max: 98.2 (03-27-19 @ 09:45)  HR: 97 (03-28-19 @ 05:14)  BP: 154/89 (03-28-19 @ 05:14)  RR: 20 (03-27-19 @ 23:38)  SpO2: 97% (03-27-19 @ 23:38)  POCT Blood Glucose.: 164 mg/dL (27 Mar 2019 21:49)    GENERAL: Alert, NAD  EXTREMITIES: LUE AVF with +thrill and bruit, +radial pulse, distal extremity warm to palpation    I&O's Detail    LABS:                        8.5    8.63  )-----------( 321      ( 27 Mar 2019 07:00 )             27.3     03-27    136  |  101  |  51<H>  ----------------------------<  105<H>  4.9   |  27  |  9.56<H>    Ca    8.3<L>      27 Mar 2019 07:00  Phos  8.2     03-27  Mg     2.4     03-27    TPro  6.7  /  Alb  2.5<L>  /  TBili  0.3  /  DBili  x   /  AST  16  /  ALT  25  /  AlkPhos  107  03-26

## 2019-03-28 NOTE — DISCHARGE NOTE NURSING/CASE MANAGEMENT/SOCIAL WORK - NSDCDPATPORTLINK_GEN_ALL_CORE
You can access the [facility] Patient Portal, offered by [facility], by registering with the following website: [email or web address]

## 2019-03-30 LAB
ACYLCARNITINE/C0 SERPL-SRTO: 0.6 UMOL/L — SIGNIFICANT CHANGE UP (ref 0.1–0.8)
CARN ESTERS SERPL-MCNC: 12.8 UMOL/L — SIGNIFICANT CHANGE UP (ref 4–28)
CARNITINE FREE SERPL-MCNC: 21.5 UMOL/L — LOW (ref 24–63)
CARNITINE SERPL-MCNC: 34.3 UMOL/L — LOW (ref 35–84)
CARNITINE SERPL-MCNC: SIGNIFICANT CHANGE UP

## 2019-04-01 LAB
CULTURE RESULTS: SIGNIFICANT CHANGE UP
CULTURE RESULTS: SIGNIFICANT CHANGE UP
SPECIMEN SOURCE: SIGNIFICANT CHANGE UP
SPECIMEN SOURCE: SIGNIFICANT CHANGE UP

## 2019-04-02 ENCOUNTER — EMERGENCY (EMERGENCY)
Facility: HOSPITAL | Age: 39
LOS: 1 days | Discharge: HOME | End: 2019-04-02
Attending: EMERGENCY MEDICINE
Payer: MEDICARE

## 2019-04-02 VITALS
RESPIRATION RATE: 16 BRPM | HEIGHT: 78 IN | SYSTOLIC BLOOD PRESSURE: 145 MMHG | WEIGHT: 315 LBS | DIASTOLIC BLOOD PRESSURE: 84 MMHG | OXYGEN SATURATION: 100 % | TEMPERATURE: 98 F | HEART RATE: 95 BPM

## 2019-04-02 DIAGNOSIS — Z98.890 OTHER SPECIFIED POSTPROCEDURAL STATES: Chronic | ICD-10-CM

## 2019-04-02 DIAGNOSIS — Z98.89 OTHER SPECIFIED POSTPROCEDURAL STATES: Chronic | ICD-10-CM

## 2019-04-02 PROCEDURE — 93970 EXTREMITY STUDY: CPT | Mod: 26

## 2019-04-02 PROCEDURE — 99283 EMERGENCY DEPT VISIT LOW MDM: CPT

## 2019-04-02 RX ORDER — GABAPENTIN 400 MG/1
300 CAPSULE ORAL ONCE
Qty: 0 | Refills: 0 | Status: COMPLETED | OUTPATIENT
Start: 2019-04-02 | End: 2019-04-02

## 2019-04-02 RX ORDER — ACETAMINOPHEN 500 MG
975 TABLET ORAL ONCE
Qty: 0 | Refills: 0 | Status: COMPLETED | OUTPATIENT
Start: 2019-04-02 | End: 2019-04-02

## 2019-04-02 RX ORDER — METHOCARBAMOL 500 MG/1
750 TABLET, FILM COATED ORAL ONCE
Qty: 0 | Refills: 0 | Status: COMPLETED | OUTPATIENT
Start: 2019-04-02 | End: 2019-04-02

## 2019-04-02 RX ORDER — CYCLOBENZAPRINE HYDROCHLORIDE 10 MG/1
10 TABLET, FILM COATED ORAL ONCE
Qty: 0 | Refills: 0 | Status: COMPLETED | OUTPATIENT
Start: 2019-04-02 | End: 2019-04-02

## 2019-04-02 RX ORDER — KETOROLAC TROMETHAMINE 30 MG/ML
30 SYRINGE (ML) INJECTION ONCE
Qty: 0 | Refills: 0 | Status: DISCONTINUED | OUTPATIENT
Start: 2019-04-02 | End: 2019-04-02

## 2019-04-02 RX ADMIN — Medication 30 MILLIGRAM(S): at 18:16

## 2019-04-02 RX ADMIN — CYCLOBENZAPRINE HYDROCHLORIDE 10 MILLIGRAM(S): 10 TABLET, FILM COATED ORAL at 15:52

## 2019-04-02 RX ADMIN — GABAPENTIN 300 MILLIGRAM(S): 400 CAPSULE ORAL at 15:52

## 2019-04-02 RX ADMIN — Medication 975 MILLIGRAM(S): at 15:52

## 2019-04-02 RX ADMIN — Medication 975 MILLIGRAM(S): at 17:12

## 2019-04-02 RX ADMIN — METHOCARBAMOL 750 MILLIGRAM(S): 500 TABLET, FILM COATED ORAL at 18:16

## 2019-04-02 NOTE — ED ADULT NURSE NOTE - OBJECTIVE STATEMENT
AXOX3 , after dialysis today ,  pt left dialysis shunt , ,BIBA from home with difficulty walking due to  bilateral lower legs swelling and pain

## 2019-04-02 NOTE — ED PROVIDER NOTE - SKIN, MLM
Skin normal color for race, warm, dry and intact. No evidence of rash. No erythema to thigh, no calf tenderness

## 2019-04-02 NOTE — ED PROVIDER NOTE - OBJECTIVE STATEMENT
39 y/o M with multiple medical problems, on dialysis (T, TH, S) presents to the ED c/o b/l thigh pain (left greater than right). Pt was recently admitted last week for cramping. He was seen by pain management at the time with recommendations all on his chart which will be followed. pt denies CP or SOB. No further complaints at this time.

## 2019-04-02 NOTE — ED ADULT NURSE NOTE - NSIMPLEMENTINTERV_GEN_ALL_ED
Implemented All Universal Safety Interventions:  [place] to call system. Call bell, personal items and telephone within reach. Instruct patient to call for assistance. Room bathroom lighting operational. Non-slip footwear when patient is off stretcher. Physically safe environment: no spills, clutter or unnecessary equipment. Stretcher in lowest position, wheels locked, appropriate side rails in place.

## 2019-04-09 ENCOUNTER — INPATIENT (INPATIENT)
Facility: HOSPITAL | Age: 39
LOS: 2 days | Discharge: HOME | DRG: 602 | End: 2019-04-12
Attending: INTERNAL MEDICINE | Admitting: INTERNAL MEDICINE
Payer: MEDICARE

## 2019-04-09 VITALS
WEIGHT: 315 LBS | HEIGHT: 78 IN | HEART RATE: 104 BPM | OXYGEN SATURATION: 98 % | RESPIRATION RATE: 18 BRPM | DIASTOLIC BLOOD PRESSURE: 92 MMHG | TEMPERATURE: 98 F | SYSTOLIC BLOOD PRESSURE: 148 MMHG

## 2019-04-09 DIAGNOSIS — Z98.890 OTHER SPECIFIED POSTPROCEDURAL STATES: Chronic | ICD-10-CM

## 2019-04-09 DIAGNOSIS — F20.9 SCHIZOPHRENIA, UNSPECIFIED: ICD-10-CM

## 2019-04-09 DIAGNOSIS — N18.6 END STAGE RENAL DISEASE: ICD-10-CM

## 2019-04-09 DIAGNOSIS — Z98.89 OTHER SPECIFIED POSTPROCEDURAL STATES: Chronic | ICD-10-CM

## 2019-04-09 DIAGNOSIS — L03.90 CELLULITIS, UNSPECIFIED: ICD-10-CM

## 2019-04-09 DIAGNOSIS — E11.9 TYPE 2 DIABETES MELLITUS WITHOUT COMPLICATIONS: ICD-10-CM

## 2019-04-09 DIAGNOSIS — Z29.9 ENCOUNTER FOR PROPHYLACTIC MEASURES, UNSPECIFIED: ICD-10-CM

## 2019-04-09 DIAGNOSIS — I10 ESSENTIAL (PRIMARY) HYPERTENSION: ICD-10-CM

## 2019-04-09 LAB
ACETONE SERPL-MCNC: NEGATIVE — SIGNIFICANT CHANGE UP
ALBUMIN SERPL ELPH-MCNC: 2.8 G/DL — LOW (ref 3.5–5)
ALP SERPL-CCNC: 96 U/L — SIGNIFICANT CHANGE UP (ref 40–120)
ALT FLD-CCNC: 28 U/L DA — SIGNIFICANT CHANGE UP (ref 10–60)
ANION GAP SERPL CALC-SCNC: 6 MMOL/L — SIGNIFICANT CHANGE UP (ref 5–17)
APTT BLD: 34.4 SEC — SIGNIFICANT CHANGE UP (ref 27.5–36.3)
AST SERPL-CCNC: 25 U/L — SIGNIFICANT CHANGE UP (ref 10–40)
BASOPHILS # BLD AUTO: 0.03 K/UL — SIGNIFICANT CHANGE UP (ref 0–0.2)
BASOPHILS NFR BLD AUTO: 0.3 % — SIGNIFICANT CHANGE UP (ref 0–2)
BILIRUB SERPL-MCNC: 0.2 MG/DL — SIGNIFICANT CHANGE UP (ref 0.2–1.2)
BUN SERPL-MCNC: 31 MG/DL — HIGH (ref 7–18)
CALCIUM SERPL-MCNC: 8.1 MG/DL — LOW (ref 8.4–10.5)
CHLORIDE SERPL-SCNC: 99 MMOL/L — SIGNIFICANT CHANGE UP (ref 96–108)
CK SERPL-CCNC: 129 U/L — SIGNIFICANT CHANGE UP (ref 35–232)
CO2 SERPL-SCNC: 36 MMOL/L — HIGH (ref 22–31)
CREAT SERPL-MCNC: 6.98 MG/DL — HIGH (ref 0.5–1.3)
EOSINOPHIL # BLD AUTO: 0.36 K/UL — SIGNIFICANT CHANGE UP (ref 0–0.5)
EOSINOPHIL NFR BLD AUTO: 3.7 % — SIGNIFICANT CHANGE UP (ref 0–6)
GLUCOSE BLDC GLUCOMTR-MCNC: 162 MG/DL — HIGH (ref 70–99)
GLUCOSE SERPL-MCNC: 130 MG/DL — HIGH (ref 70–99)
HCT VFR BLD CALC: 32.6 % — LOW (ref 39–50)
HGB BLD-MCNC: 9.9 G/DL — LOW (ref 13–17)
IMM GRANULOCYTES NFR BLD AUTO: 0.3 % — SIGNIFICANT CHANGE UP (ref 0–1.5)
INR BLD: 1.06 RATIO — SIGNIFICANT CHANGE UP (ref 0.88–1.16)
LACTATE SERPL-SCNC: 1.9 MMOL/L — SIGNIFICANT CHANGE UP (ref 0.7–2)
LYMPHOCYTES # BLD AUTO: 1.86 K/UL — SIGNIFICANT CHANGE UP (ref 1–3.3)
LYMPHOCYTES # BLD AUTO: 19.3 % — SIGNIFICANT CHANGE UP (ref 13–44)
MAGNESIUM SERPL-MCNC: 2 MG/DL — SIGNIFICANT CHANGE UP (ref 1.6–2.6)
MCHC RBC-ENTMCNC: 30.4 GM/DL — LOW (ref 32–36)
MCHC RBC-ENTMCNC: 31.3 PG — SIGNIFICANT CHANGE UP (ref 27–34)
MCV RBC AUTO: 103.2 FL — HIGH (ref 80–100)
MONOCYTES # BLD AUTO: 0.94 K/UL — HIGH (ref 0–0.9)
MONOCYTES NFR BLD AUTO: 9.8 % — SIGNIFICANT CHANGE UP (ref 2–14)
NEUTROPHILS # BLD AUTO: 6.42 K/UL — SIGNIFICANT CHANGE UP (ref 1.8–7.4)
NEUTROPHILS NFR BLD AUTO: 66.6 % — SIGNIFICANT CHANGE UP (ref 43–77)
NRBC # BLD: 0 /100 WBCS — SIGNIFICANT CHANGE UP (ref 0–0)
NT-PROBNP SERPL-SCNC: 8216 PG/ML — HIGH (ref 0–125)
PLATELET # BLD AUTO: 431 K/UL — HIGH (ref 150–400)
POTASSIUM SERPL-MCNC: 4.3 MMOL/L — SIGNIFICANT CHANGE UP (ref 3.5–5.3)
POTASSIUM SERPL-SCNC: 4.3 MMOL/L — SIGNIFICANT CHANGE UP (ref 3.5–5.3)
PROT SERPL-MCNC: 7.4 G/DL — SIGNIFICANT CHANGE UP (ref 6–8.3)
PROTHROM AB SERPL-ACNC: 11.8 SEC — SIGNIFICANT CHANGE UP (ref 10–12.9)
RBC # BLD: 3.16 M/UL — LOW (ref 4.2–5.8)
RBC # FLD: 15.3 % — HIGH (ref 10.3–14.5)
SODIUM SERPL-SCNC: 141 MMOL/L — SIGNIFICANT CHANGE UP (ref 135–145)
WBC # BLD: 9.64 K/UL — SIGNIFICANT CHANGE UP (ref 3.8–10.5)
WBC # FLD AUTO: 9.64 K/UL — SIGNIFICANT CHANGE UP (ref 3.8–10.5)

## 2019-04-09 PROCEDURE — 99285 EMERGENCY DEPT VISIT HI MDM: CPT

## 2019-04-09 PROCEDURE — 99284 EMERGENCY DEPT VISIT MOD MDM: CPT | Mod: 25

## 2019-04-09 PROCEDURE — 73620 X-RAY EXAM OF FOOT: CPT | Mod: 26,LT

## 2019-04-09 PROCEDURE — 96372 THER/PROPH/DIAG INJ SC/IM: CPT

## 2019-04-09 PROCEDURE — 93970 EXTREMITY STUDY: CPT

## 2019-04-09 PROCEDURE — 71045 X-RAY EXAM CHEST 1 VIEW: CPT | Mod: 26

## 2019-04-09 PROCEDURE — 93005 ELECTROCARDIOGRAM TRACING: CPT

## 2019-04-09 RX ORDER — GABAPENTIN 400 MG/1
400 CAPSULE ORAL DAILY
Qty: 0 | Refills: 0 | Status: DISCONTINUED | OUTPATIENT
Start: 2019-04-09 | End: 2019-04-12

## 2019-04-09 RX ORDER — VANCOMYCIN HCL 1 G
1000 VIAL (EA) INTRAVENOUS
Qty: 0 | Refills: 0 | Status: DISCONTINUED | OUTPATIENT
Start: 2019-04-09 | End: 2019-04-10

## 2019-04-09 RX ORDER — INSULIN GLARGINE 100 [IU]/ML
15 INJECTION, SOLUTION SUBCUTANEOUS
Qty: 0 | Refills: 0 | COMMUNITY

## 2019-04-09 RX ORDER — INSULIN LISPRO 100/ML
VIAL (ML) SUBCUTANEOUS
Qty: 0 | Refills: 0 | Status: DISCONTINUED | OUTPATIENT
Start: 2019-04-09 | End: 2019-04-12

## 2019-04-09 RX ORDER — SIMVASTATIN 20 MG/1
40 TABLET, FILM COATED ORAL AT BEDTIME
Qty: 0 | Refills: 0 | Status: DISCONTINUED | OUTPATIENT
Start: 2019-04-09 | End: 2019-04-12

## 2019-04-09 RX ORDER — CLOPIDOGREL BISULFATE 75 MG/1
75 TABLET, FILM COATED ORAL DAILY
Qty: 0 | Refills: 0 | Status: DISCONTINUED | OUTPATIENT
Start: 2019-04-09 | End: 2019-04-12

## 2019-04-09 RX ORDER — MORPHINE SULFATE 50 MG/1
4 CAPSULE, EXTENDED RELEASE ORAL ONCE
Qty: 0 | Refills: 0 | Status: DISCONTINUED | OUTPATIENT
Start: 2019-04-09 | End: 2019-04-09

## 2019-04-09 RX ORDER — QUETIAPINE FUMARATE 200 MG/1
200 TABLET, FILM COATED ORAL DAILY
Qty: 0 | Refills: 0 | Status: DISCONTINUED | OUTPATIENT
Start: 2019-04-09 | End: 2019-04-12

## 2019-04-09 RX ORDER — VANCOMYCIN HCL 1 G
1000 VIAL (EA) INTRAVENOUS ONCE
Qty: 0 | Refills: 0 | Status: COMPLETED | OUTPATIENT
Start: 2019-04-09 | End: 2019-04-09

## 2019-04-09 RX ORDER — INSULIN LISPRO 100/ML
10 VIAL (ML) SUBCUTANEOUS
Qty: 0 | Refills: 0 | Status: DISCONTINUED | OUTPATIENT
Start: 2019-04-09 | End: 2019-04-10

## 2019-04-09 RX ORDER — INSULIN GLARGINE 100 [IU]/ML
20 INJECTION, SOLUTION SUBCUTANEOUS AT BEDTIME
Qty: 0 | Refills: 0 | Status: DISCONTINUED | OUTPATIENT
Start: 2019-04-09 | End: 2019-04-10

## 2019-04-09 RX ORDER — SEVELAMER CARBONATE 2400 MG/1
800 POWDER, FOR SUSPENSION ORAL
Qty: 0 | Refills: 0 | Status: DISCONTINUED | OUTPATIENT
Start: 2019-04-09 | End: 2019-04-12

## 2019-04-09 RX ORDER — ONDANSETRON 8 MG/1
4 TABLET, FILM COATED ORAL ONCE
Qty: 0 | Refills: 0 | Status: COMPLETED | OUTPATIENT
Start: 2019-04-09 | End: 2019-04-09

## 2019-04-09 RX ORDER — PANTOPRAZOLE SODIUM 20 MG/1
40 TABLET, DELAYED RELEASE ORAL
Qty: 0 | Refills: 0 | Status: DISCONTINUED | OUTPATIENT
Start: 2019-04-09 | End: 2019-04-12

## 2019-04-09 RX ORDER — OXYCODONE AND ACETAMINOPHEN 5; 325 MG/1; MG/1
2 TABLET ORAL EVERY 4 HOURS
Qty: 0 | Refills: 0 | Status: DISCONTINUED | OUTPATIENT
Start: 2019-04-09 | End: 2019-04-10

## 2019-04-09 RX ORDER — AZTREONAM 2 G
500 VIAL (EA) INJECTION ONCE
Qty: 0 | Refills: 0 | Status: DISCONTINUED | OUTPATIENT
Start: 2019-04-09 | End: 2019-04-09

## 2019-04-09 RX ORDER — AZTREONAM 2 G
VIAL (EA) INJECTION
Qty: 0 | Refills: 0 | Status: DISCONTINUED | OUTPATIENT
Start: 2019-04-09 | End: 2019-04-09

## 2019-04-09 RX ADMIN — Medication 1: at 23:14

## 2019-04-09 RX ADMIN — MORPHINE SULFATE 4 MILLIGRAM(S): 50 CAPSULE, EXTENDED RELEASE ORAL at 19:49

## 2019-04-09 RX ADMIN — SIMVASTATIN 40 MILLIGRAM(S): 20 TABLET, FILM COATED ORAL at 23:13

## 2019-04-09 RX ADMIN — ONDANSETRON 4 MILLIGRAM(S): 8 TABLET, FILM COATED ORAL at 17:28

## 2019-04-09 RX ADMIN — MORPHINE SULFATE 4 MILLIGRAM(S): 50 CAPSULE, EXTENDED RELEASE ORAL at 20:10

## 2019-04-09 RX ADMIN — Medication 250 MILLIGRAM(S): at 18:19

## 2019-04-09 RX ADMIN — MORPHINE SULFATE 4 MILLIGRAM(S): 50 CAPSULE, EXTENDED RELEASE ORAL at 17:27

## 2019-04-09 NOTE — H&P ADULT - ASSESSMENT
38 Male with PMH of Type 2 DM, Migraine, ESRD on HD for 3 yrs (left arm graft, TTS), Morbid obesity, HTN, Chr dyspnea (uses home O2 prn) presented to hospital for left foot swelling and redness.   In Ed, BP: 148/92mm hg, Hr: 104, Temp: 98.4 F  cbc shows no white count, hb stable from before.   Bmp shows elevated bun/creatinine     Will admit to medicine for Cellulitis.

## 2019-04-09 NOTE — ED PROVIDER NOTE - MUSCULOSKELETAL, MLM
Spine appears normal, range of motion is not limited, no muscle or joint tenderness, Lt upper arm- AV graft with thrills, Lt foot-dorsal aspect-erythematous, swelling, warm & tenderness to palp, 2 small lesions noted, no crepitus, Spine appears normal, range of motion is not limited, no muscle or joint tenderness, Lt upper arm- AV graft with thrills, Lt foot-dorsal aspect-erythematous, swelling, warm & tenderness to palp, no crepitus, 2 small lesions noted, no crepitus,

## 2019-04-09 NOTE — H&P ADULT - NSHPLABSRESULTS_GEN_ALL_CORE
9.9    9.64  )-----------( 431      ( 09 Apr 2019 17:36 )             32.6       04-09    141  |  99  |  31<H>  ----------------------------<  130<H>  4.3   |  36<H>  |  6.98<H>    Ca    8.1<L>      09 Apr 2019 17:36  Mg     2.0     04-09    TPro  7.4  /  Alb  2.8<L>  /  TBili  0.2  /  DBili  x   /  AST  25  /  ALT  28  /  AlkPhos  96  04-09                  PT/INR - ( 09 Apr 2019 17:36 )   PT: 11.8 sec;   INR: 1.06 ratio         PTT - ( 09 Apr 2019 17:36 )  PTT:34.4 sec    Lactate Trend  04-09 @ 17:36 Lactate:1.9       CARDIAC MARKERS ( 09 Apr 2019 17:36 )  x     / x     / 129 U/L / x     / x            CAPILLARY BLOOD GLUCOSE            Culture Results:   No growth at 5 days. (03-27 @ 11:24)  Culture Results:   No growth at 5 days. (03-27 @ 11:24)

## 2019-04-09 NOTE — H&P ADULT - NSICDXFAMILYHX_GEN_ALL_CORE_FT
FAMILY HISTORY:  Family history of COPD (chronic obstructive pulmonary disease)  Family history of diabetes mellitus

## 2019-04-09 NOTE — H&P ADULT - NSICDXPASTMEDICALHX_GEN_ALL_CORE_FT
PAST MEDICAL HISTORY:  Bipolar disorder     Diabetes     DM (diabetes mellitus)     ESRD (end stage renal disease)     ESRD on dialysis     HTN (hypertension)     Migraine     Morbid obesity with BMI of 40.0-44.9, adult     Obesity     Schizophrenia

## 2019-04-09 NOTE — ED ADULT NURSE NOTE - CADM POA VASCULAR ACCESS TYPE
Breast cancer  06/01/2018    Active  Heather Billings  Breast cancer in female  06/11/2018  left side  Active  Hilario Barcenas
dialysis catheter

## 2019-04-09 NOTE — ED PROVIDER NOTE - OBJECTIVE STATEMENT
37 y/o M pt with a PMHx of Bipolar, DM, ESRD, HTN, Migraine, Morbid Obesity, and Schizophrenia and a PSHx of a Hernia repair BIB EMS for L foot pain and swelling since this morning. Pt states that while today he was getting dialyzed he brought up his sx for which he was told to monitor for a week. Pt states that he was not comfortable waiting and instead wanted to seek immediate medical attention. Pt relates inability to ambulate secondary to the foot swelling and pain. Pt reports that his last dosage of insulin was a week ago. Pt denies itchiness, injuries, fever, chills, administration of abx during dialysis today, nausea, vomiting, or any other acute complaints. Allergies: Penicillin & ASA.

## 2019-04-09 NOTE — ED ADULT NURSE NOTE - NSIMPLEMENTINTERV_GEN_ALL_ED
Implemented All Fall Risk Interventions:  [place] to call system. Call bell, personal items and telephone within reach. Instruct patient to call for assistance. Room bathroom lighting operational. Non-slip footwear when patient is off stretcher. Physically safe environment: no spills, clutter or unnecessary equipment. Stretcher in lowest position, wheels locked, appropriate side rails in place. Provide visual cue, wrist band, yellow gown, etc. Monitor gait and stability. Monitor for mental status changes and reorient to person, place, and time. Review medications for side effects contributing to fall risk. Reinforce activity limits and safety measures with patient and family.

## 2019-04-09 NOTE — H&P ADULT - NSHPPHYSICALEXAM_GEN_ALL_CORE
PHYSICAL EXAM:  GENERAL: NAD  HEENT: Normocephalic;  conjunctivae and sclerae clear; moist mucous membranes;   NECK : supple  CHEST/LUNG: Clear to auscultation bilaterally with good air entry   HEART: S1 S2  regular; no murmurs, gallops or rubs  ABDOMEN: Soft, Nontender, Nondistended; Bowel sounds present  EXTREMITIES: left foot swelling and redness, with tenderness +, no open wounds, no pus discharge   SKIN: warm and dry; no rash  NERVOUS SYSTEM:  Awake and alert; Oriented  to place, person and time ; no new deficits

## 2019-04-09 NOTE — ED ADULT NURSE NOTE - ED STAT RN HANDOFF DETAILS
Report given to RN Jodie. Pt resting in bed, no acute distress noted, denies chest pain, no shortness of breath indicated. safety maintained.

## 2019-04-09 NOTE — ED ADULT NURSE NOTE - OBJECTIVE STATEMENT
Pt s/p hemodialysis (T,TH,Sat). Pt c/o of left foot pain. Upon assessment, left foot swollen, redness noted. AV fistula noted to left upper arm. Area clean/dry/intact. No acute distress noted, denies chest pain, no shortness of breath indicated. Safety maintained.

## 2019-04-09 NOTE — H&P ADULT - PROBLEM SELECTOR PLAN 6
IMPROVE VTE Individual Risk Assessment          RISK                                                          Points  [  ] Previous VTE                                                3  [  ] Thrombophilia                                             2  [  ] Lower limb paralysis                                   2        (unable to hold up >15 seconds)    [  ] Current Cancer                                             2         (within 6 months)  [  ] Immobilization > 24 hrs                              1  [  ] ICU/CCU stay > 24 hours                             1  [  ] Age > 60                                                         1    IMPROVE VTE Score: 0   no indication of dvt ppx

## 2019-04-09 NOTE — H&P ADULT - HISTORY OF PRESENT ILLNESS
38 Male with PMH of Type 2 DM, Migraine, ESRD on HD for 3 yrs (left arm graft, TTS), Morbid obesity, HTN, Chr dyspnea (uses home O2 prn) presented to hospital for left foot swelling and redness. Patient was in his usual state of health until today, started having this acute onset swelling, redness and 10/10 sharp pain on left foot. Difficulty walking due to pain. Went to HD, completed session and was told by nephrologist to come to ER.   Denies any fever, chills, nausea, vomiting or any systemic symptoms.   Off note, also had a thigh infection 2 weeks ago, which has now resolved and completed abx course.     In Ed, BP: 148/92mm hg, Hr: 104, Temp: 98.4 F  cbc shows no white count, hb stable from before.   Bmp shows elevated bun/creatinine

## 2019-04-09 NOTE — H&P ADULT - PROBLEM SELECTOR PLAN 1
Left foot cellulitis  Rf: DM  Afebrile, no leucocytosis  Will start on Azactam, Vancomycin   f/u blood cx   ID, Dr. Amaral  Tylenol prn  Leg elevation, ice therapy Left foot cellulitis  Rf: DM  Afebrile, no leucocytosis  Will start on Vancomycin per ID, Dr. Amaral  f/u blood cx   Tylenol prn  Leg elevation, ice therapy

## 2019-04-10 ENCOUNTER — APPOINTMENT (OUTPATIENT)
Dept: PULMONOLOGY | Facility: CLINIC | Age: 39
End: 2019-04-10

## 2019-04-10 LAB
ALBUMIN SERPL ELPH-MCNC: 2.8 G/DL — LOW (ref 3.5–5)
ALP SERPL-CCNC: 85 U/L — SIGNIFICANT CHANGE UP (ref 40–120)
ALT FLD-CCNC: 25 U/L DA — SIGNIFICANT CHANGE UP (ref 10–60)
ANION GAP SERPL CALC-SCNC: 9 MMOL/L — SIGNIFICANT CHANGE UP (ref 5–17)
AST SERPL-CCNC: 24 U/L — SIGNIFICANT CHANGE UP (ref 10–40)
BASOPHILS # BLD AUTO: 0.04 K/UL — SIGNIFICANT CHANGE UP (ref 0–0.2)
BASOPHILS NFR BLD AUTO: 0.5 % — SIGNIFICANT CHANGE UP (ref 0–2)
BILIRUB SERPL-MCNC: 0.2 MG/DL — SIGNIFICANT CHANGE UP (ref 0.2–1.2)
BUN SERPL-MCNC: 39 MG/DL — HIGH (ref 7–18)
CALCIUM SERPL-MCNC: 8.4 MG/DL — SIGNIFICANT CHANGE UP (ref 8.4–10.5)
CHLORIDE SERPL-SCNC: 97 MMOL/L — SIGNIFICANT CHANGE UP (ref 96–108)
CO2 SERPL-SCNC: 29 MMOL/L — SIGNIFICANT CHANGE UP (ref 22–31)
CREAT SERPL-MCNC: 7.87 MG/DL — HIGH (ref 0.5–1.3)
EOSINOPHIL # BLD AUTO: 0.33 K/UL — SIGNIFICANT CHANGE UP (ref 0–0.5)
EOSINOPHIL NFR BLD AUTO: 4 % — SIGNIFICANT CHANGE UP (ref 0–6)
GLUCOSE BLDC GLUCOMTR-MCNC: 108 MG/DL — HIGH (ref 70–99)
GLUCOSE BLDC GLUCOMTR-MCNC: 120 MG/DL — HIGH (ref 70–99)
GLUCOSE BLDC GLUCOMTR-MCNC: 75 MG/DL — SIGNIFICANT CHANGE UP (ref 70–99)
GLUCOSE BLDC GLUCOMTR-MCNC: 81 MG/DL — SIGNIFICANT CHANGE UP (ref 70–99)
GLUCOSE SERPL-MCNC: 102 MG/DL — HIGH (ref 70–99)
HCT VFR BLD CALC: 33.8 % — LOW (ref 39–50)
HGB BLD-MCNC: 9.9 G/DL — LOW (ref 13–17)
IMM GRANULOCYTES NFR BLD AUTO: 0.2 % — SIGNIFICANT CHANGE UP (ref 0–1.5)
LYMPHOCYTES # BLD AUTO: 1.89 K/UL — SIGNIFICANT CHANGE UP (ref 1–3.3)
LYMPHOCYTES # BLD AUTO: 22.9 % — SIGNIFICANT CHANGE UP (ref 13–44)
MAGNESIUM SERPL-MCNC: 2.3 MG/DL — SIGNIFICANT CHANGE UP (ref 1.6–2.6)
MCHC RBC-ENTMCNC: 29.3 GM/DL — LOW (ref 32–36)
MCHC RBC-ENTMCNC: 31 PG — SIGNIFICANT CHANGE UP (ref 27–34)
MCV RBC AUTO: 106 FL — HIGH (ref 80–100)
MONOCYTES # BLD AUTO: 0.84 K/UL — SIGNIFICANT CHANGE UP (ref 0–0.9)
MONOCYTES NFR BLD AUTO: 10.2 % — SIGNIFICANT CHANGE UP (ref 2–14)
NEUTROPHILS # BLD AUTO: 5.15 K/UL — SIGNIFICANT CHANGE UP (ref 1.8–7.4)
NEUTROPHILS NFR BLD AUTO: 62.2 % — SIGNIFICANT CHANGE UP (ref 43–77)
NRBC # BLD: 0 /100 WBCS — SIGNIFICANT CHANGE UP (ref 0–0)
PHOSPHATE SERPL-MCNC: 7.7 MG/DL — HIGH (ref 2.5–4.5)
PLATELET # BLD AUTO: 380 K/UL — SIGNIFICANT CHANGE UP (ref 150–400)
POTASSIUM SERPL-MCNC: 4.4 MMOL/L — SIGNIFICANT CHANGE UP (ref 3.5–5.3)
POTASSIUM SERPL-SCNC: 4.4 MMOL/L — SIGNIFICANT CHANGE UP (ref 3.5–5.3)
PROT SERPL-MCNC: 7.1 G/DL — SIGNIFICANT CHANGE UP (ref 6–8.3)
RBC # BLD: 3.19 M/UL — LOW (ref 4.2–5.8)
RBC # FLD: 15.3 % — HIGH (ref 10.3–14.5)
SODIUM SERPL-SCNC: 135 MMOL/L — SIGNIFICANT CHANGE UP (ref 135–145)
WBC # BLD: 8.27 K/UL — SIGNIFICANT CHANGE UP (ref 3.8–10.5)
WBC # FLD AUTO: 8.27 K/UL — SIGNIFICANT CHANGE UP (ref 3.8–10.5)

## 2019-04-10 RX ORDER — ACETAMINOPHEN 500 MG
1000 TABLET ORAL EVERY 8 HOURS
Qty: 0 | Refills: 0 | Status: COMPLETED | OUTPATIENT
Start: 2019-04-10 | End: 2019-04-12

## 2019-04-10 RX ORDER — ERYTHROPOIETIN 10000 [IU]/ML
4000 INJECTION, SOLUTION INTRAVENOUS; SUBCUTANEOUS
Qty: 0 | Refills: 0 | Status: DISCONTINUED | OUTPATIENT
Start: 2019-04-10 | End: 2019-04-12

## 2019-04-10 RX ORDER — VANCOMYCIN HCL 1 G
1250 VIAL (EA) INTRAVENOUS
Qty: 0 | Refills: 0 | Status: DISCONTINUED | OUTPATIENT
Start: 2019-04-10 | End: 2019-04-12

## 2019-04-10 RX ORDER — LANOLIN ALCOHOL/MO/W.PET/CERES
3 CREAM (GRAM) TOPICAL AT BEDTIME
Qty: 0 | Refills: 0 | Status: DISCONTINUED | OUTPATIENT
Start: 2019-04-10 | End: 2019-04-12

## 2019-04-10 RX ORDER — INSULIN LISPRO 100/ML
5 VIAL (ML) SUBCUTANEOUS
Qty: 0 | Refills: 0 | Status: DISCONTINUED | OUTPATIENT
Start: 2019-04-10 | End: 2019-04-12

## 2019-04-10 RX ORDER — MORPHINE SULFATE 50 MG/1
2 CAPSULE, EXTENDED RELEASE ORAL EVERY 6 HOURS
Qty: 0 | Refills: 0 | Status: DISCONTINUED | OUTPATIENT
Start: 2019-04-10 | End: 2019-04-10

## 2019-04-10 RX ORDER — INSULIN GLARGINE 100 [IU]/ML
10 INJECTION, SOLUTION SUBCUTANEOUS AT BEDTIME
Qty: 0 | Refills: 0 | Status: DISCONTINUED | OUTPATIENT
Start: 2019-04-10 | End: 2019-04-12

## 2019-04-10 RX ORDER — MORPHINE SULFATE 50 MG/1
2 CAPSULE, EXTENDED RELEASE ORAL ONCE
Qty: 0 | Refills: 0 | Status: DISCONTINUED | OUTPATIENT
Start: 2019-04-10 | End: 2019-04-10

## 2019-04-10 RX ORDER — HEPARIN SODIUM 5000 [USP'U]/ML
5000 INJECTION INTRAVENOUS; SUBCUTANEOUS EVERY 12 HOURS
Qty: 0 | Refills: 0 | Status: DISCONTINUED | OUTPATIENT
Start: 2019-04-10 | End: 2019-04-12

## 2019-04-10 RX ORDER — OXYCODONE AND ACETAMINOPHEN 5; 325 MG/1; MG/1
1 TABLET ORAL ONCE
Qty: 0 | Refills: 0 | Status: DISCONTINUED | OUTPATIENT
Start: 2019-04-10 | End: 2019-04-10

## 2019-04-10 RX ORDER — LIDOCAINE 4 G/100G
1 CREAM TOPICAL ONCE
Qty: 0 | Refills: 0 | Status: DISCONTINUED | OUTPATIENT
Start: 2019-04-10 | End: 2019-04-12

## 2019-04-10 RX ADMIN — SEVELAMER CARBONATE 800 MILLIGRAM(S): 2400 POWDER, FOR SUSPENSION ORAL at 17:50

## 2019-04-10 RX ADMIN — Medication 3 MILLIGRAM(S): at 23:03

## 2019-04-10 RX ADMIN — OXYCODONE AND ACETAMINOPHEN 2 TABLET(S): 5; 325 TABLET ORAL at 00:59

## 2019-04-10 RX ADMIN — MORPHINE SULFATE 2 MILLIGRAM(S): 50 CAPSULE, EXTENDED RELEASE ORAL at 23:24

## 2019-04-10 RX ADMIN — MORPHINE SULFATE 2 MILLIGRAM(S): 50 CAPSULE, EXTENDED RELEASE ORAL at 09:11

## 2019-04-10 RX ADMIN — INSULIN GLARGINE 20 UNIT(S): 100 INJECTION, SOLUTION SUBCUTANEOUS at 00:05

## 2019-04-10 RX ADMIN — CLOPIDOGREL BISULFATE 75 MILLIGRAM(S): 75 TABLET, FILM COATED ORAL at 12:35

## 2019-04-10 RX ADMIN — OXYCODONE AND ACETAMINOPHEN 2 TABLET(S): 5; 325 TABLET ORAL at 00:11

## 2019-04-10 RX ADMIN — HEPARIN SODIUM 5000 UNIT(S): 5000 INJECTION INTRAVENOUS; SUBCUTANEOUS at 17:50

## 2019-04-10 RX ADMIN — SEVELAMER CARBONATE 800 MILLIGRAM(S): 2400 POWDER, FOR SUSPENSION ORAL at 08:18

## 2019-04-10 RX ADMIN — SIMVASTATIN 40 MILLIGRAM(S): 20 TABLET, FILM COATED ORAL at 23:03

## 2019-04-10 RX ADMIN — QUETIAPINE FUMARATE 200 MILLIGRAM(S): 200 TABLET, FILM COATED ORAL at 12:37

## 2019-04-10 RX ADMIN — MORPHINE SULFATE 2 MILLIGRAM(S): 50 CAPSULE, EXTENDED RELEASE ORAL at 08:41

## 2019-04-10 RX ADMIN — MORPHINE SULFATE 2 MILLIGRAM(S): 50 CAPSULE, EXTENDED RELEASE ORAL at 03:10

## 2019-04-10 RX ADMIN — MORPHINE SULFATE 2 MILLIGRAM(S): 50 CAPSULE, EXTENDED RELEASE ORAL at 02:31

## 2019-04-10 RX ADMIN — SEVELAMER CARBONATE 800 MILLIGRAM(S): 2400 POWDER, FOR SUSPENSION ORAL at 12:36

## 2019-04-10 RX ADMIN — GABAPENTIN 400 MILLIGRAM(S): 400 CAPSULE ORAL at 12:36

## 2019-04-10 RX ADMIN — PANTOPRAZOLE SODIUM 40 MILLIGRAM(S): 20 TABLET, DELAYED RELEASE ORAL at 08:18

## 2019-04-10 RX ADMIN — MORPHINE SULFATE 2 MILLIGRAM(S): 50 CAPSULE, EXTENDED RELEASE ORAL at 23:07

## 2019-04-10 RX ADMIN — Medication 5 UNIT(S): at 17:49

## 2019-04-10 RX ADMIN — Medication 10 UNIT(S): at 08:40

## 2019-04-10 NOTE — CONSULT NOTE ADULT - SUBJECTIVE AND OBJECTIVE BOX
[facility] : Progress Note :: [phone], (office [phone]),   Dr La / Dr Hui / Dr Barber / Dr Villalobos / Dr Hang CASTELLANO / Dr Mehta / Dr Sanchez / Dr Alber dumont  _____________________________________________________________________________________________  Patient is a 38y Male with HTN, DM2 and ESRDon HD [facility]. presented to the hospital from the dialysis unit with acute onset of left foot and leg swelling and pain. Admitted with left lower extremity cellulitis on IV abx. renal consulted for ESRD. denies any fever or chills.     PAST MEDICAL & SURGICAL HISTORY:  Migraine  Obesity  ESRD (end stage renal disease)  HTN (hypertension)  DM (diabetes mellitus)  Bipolar disorder  Schizophrenia  ESRD on dialysis  Morbid obesity with BMI of 40.0-44.9, adult  Diabetes  H/O hernia repair  H/O hernia repair    aspirin (Short breath)  aspirin (Swelling)  penicillin (Short breath)  penicillins (Swelling)  shellfish (Unknown)    Home Medications Reviewed  Hospital Medications:   MEDICATIONS  (STANDING):  clopidogrel Tablet 75 milliGRAM(s) Oral daily  gabapentin 400 milliGRAM(s) Oral daily  heparin  Injectable 5000 Unit(s) SubCutaneous every 12 hours  insulin glargine Injectable (LANTUS) 20 Unit(s) SubCutaneous at bedtime  insulin lispro (HumaLOG) corrective regimen sliding scale   SubCutaneous Before meals and at bedtime  insulin lispro Injectable (HumaLOG) 5 Unit(s) SubCutaneous three times a day before meals  lidocaine   Patch 1 Patch Transdermal once  melatonin 3 milliGRAM(s) Oral at bedtime  pantoprazole    Tablet 40 milliGRAM(s) Oral before breakfast  QUEtiapine 200 milliGRAM(s) Oral daily  sevelamer carbonate 800 milliGRAM(s) Oral three times a day with meals  simvastatin 40 milliGRAM(s) Oral at bedtime  vancomycin  IVPB 1250 milliGRAM(s) IV Intermittent <User Schedule>    SOCIAL HISTORY:  Denies ETOh,Smoking,   FAMILY HISTORY:  Family history of COPD (chronic obstructive pulmonary disease)  Family history of diabetes mellitus        VITALS:  T(F): 97.8 (04-10-19 @ 14:31), Max: 98.3 (04-10-19 @ 00:55)  HR: 84 (04-10-19 @ 14:31)  BP: 146/91 (04-10-19 @ 14:31)  RR: 18 (04-10-19 @ 14:31)  SpO2: 97% (04-10-19 @ 14:31)  Wt(kg): --    04-09 @ 07:01  -  04-10 @ 07:00  --------------------------------------------------------  IN: 0 mL / OUT: 500 mL / NET: -500 mL        PHYSICAL EXAM:  Constitutional: NAD  HEENT: anicteric sclera, oropharynx clear.  Neck: No JVD  Respiratory: CTAB, no wheezes, rales or rhonchi  Cardiovascular: S1, S2, RRR  Gastrointestinal: BS+, soft, NT/ND  Extremities: peripheral edema+. LT leg warm, swollen and tender  Neurological: A/O x 3, no focal deficits  Psychiatric: Normal mood, normal affect  : No CVA tenderness. No hernandez.   Vascular Access: AVF with thrill and bruit    LABS:  04-10    135  |  97  |  39<H>  ----------------------------<  102<H>  4.4   |  29  |  7.87<H>    Ca    8.4      10 Apr 2019 07:40  Phos  7.7     04-10  Mg     2.3     04-10    TPro  7.1  /  Alb  2.8<L>  /  TBili  0.2  /  DBili      /  AST  24  /  ALT  25  /  AlkPhos  85  04-10    Creatinine Trend: 7.87 <--, 6.98 <--                        9.9    8.27  )-----------( 380      ( 10 Apr 2019 07:40 )             33.8     Urine Studies:      RADIOLOGY & ADDITIONAL STUDIES:

## 2019-04-10 NOTE — PATIENT PROFILE ADULT - NSPROHMSYMPCOND_GEN_A_NUR
none
No. NOEMY screening performed.  STOP BANG Legend: 0-2 = LOW Risk; 3-4 = INTERMEDIATE Risk; 5-8 = HIGH Risk

## 2019-04-10 NOTE — CONSULT NOTE ADULT - SUBJECTIVE AND OBJECTIVE BOX
HPI:  38 Male with PMH of Type 2 DM, Migraine, ESRD on HD for 3 yrs (left arm graft, TTS), Morbid obesity, HTN, Chr dyspnea (uses home O2 prn) presented to hospital for left foot swelling and redness. Patient was in his usual state of health until today, started having this acute onset swelling, redness and 10/10 sharp pain on left foot. Difficulty walking due to pain. Went to HD, completed session and was told by nephrologist to come to ER.   Denies any fever, chills, nausea, vomiting or any systemic symptoms.   Off note, also had a thigh infection 2 weeks ago, which has now resolved and completed abx course.     In Ed, BP: 148/92mm hg, Hr: 104, Temp: 98.4 F  cbc shows no white count, hb stable from before.   Bmp shows elevated bun/creatinine (09 Apr 2019 20:04)      PAST MEDICAL & SURGICAL HISTORY:  Migraine  Obesity  ESRD (end stage renal disease)  HTN (hypertension)  DM (diabetes mellitus)  Bipolar disorder  Schizophrenia  ESRD on dialysis  Morbid obesity with BMI of 40.0-44.9, adult  Diabetes  H/O hernia repair  H/O hernia repair      aspirin (Short breath)  aspirin (Swelling)  penicillin (Short breath)  penicillins (Swelling)  shellfish (Unknown)      Meds:  clopidogrel Tablet 75 milliGRAM(s) Oral daily  gabapentin 400 milliGRAM(s) Oral daily  heparin  Injectable 5000 Unit(s) SubCutaneous every 12 hours  insulin glargine Injectable (LANTUS) 20 Unit(s) SubCutaneous at bedtime  insulin lispro (HumaLOG) corrective regimen sliding scale   SubCutaneous Before meals and at bedtime  insulin lispro Injectable (HumaLOG) 10 Unit(s) SubCutaneous three times a day before meals  lidocaine   Patch 1 Patch Transdermal once  melatonin 3 milliGRAM(s) Oral at bedtime  morphine  - Injectable 2 milliGRAM(s) IV Push every 6 hours PRN  oxyCODONE    5 mG/acetaminophen 325 mG 2 Tablet(s) Oral every 4 hours PRN  pantoprazole    Tablet 40 milliGRAM(s) Oral before breakfast  QUEtiapine 200 milliGRAM(s) Oral daily  sevelamer carbonate 800 milliGRAM(s) Oral three times a day with meals  simvastatin 40 milliGRAM(s) Oral at bedtime  vancomycin  IVPB 1000 milliGRAM(s) IV Intermittent <User Schedule>      SOCIAL HISTORY:  Smoker:  YES / NO        PACK YEARS:                         WHEN QUIT?  ETOH use:  YES / NO               FREQUENCY / QUANTITY:  Ilicit Drug use:  YES / NO  Occupation:  Assisted device use (Cane / Walker):  Live with:    FAMILY HISTORY:  Family history of COPD (chronic obstructive pulmonary disease)  Family history of diabetes mellitus      VITALS:  Vital Signs Last 24 Hrs  T(C): 36.3 (10 Apr 2019 05:45), Max: 36.9 (09 Apr 2019 14:24)  T(F): 97.3 (10 Apr 2019 05:45), Max: 98.4 (09 Apr 2019 14:24)  HR: 84 (10 Apr 2019 05:45) (84 - 104)  BP: 145/95 (10 Apr 2019 05:45) (142/87 - 161/97)  BP(mean): --  RR: 18 (10 Apr 2019 05:45) (17 - 18)  SpO2: 97% (10 Apr 2019 05:45) (96% - 98%)      REVIEW OF SYSTEMS:    CONSTITUTIONAL: No weakness, fevers or chills  EYES/ENT: No visual changes;  No vertigo or throat pain   NECK: No pain or stiffness  RESPIRATORY: No cough, wheezing, hemoptysis; No shortness of breath  CARDIOVASCULAR: No chest pain or palpitations  GASTROINTESTINAL: No abdominal or epigastric pain. No nausea, vomiting, or hematemesis; No diarrhea or constipation. No melena or hematochezia.  GENITOURINARY: No dysuria, frequency or hematuria  NEUROLOGICAL: No numbness or weakness  SKIN: No itching, rashes      GENERAL:   HEAD: atraumatic, normocephalic   EYES: PERRLA, white sclera.   ENMT: nasal mucosa- Oral cavity-   NECK: supple, JVD/ no JVD, thyroid-   LYMPH: no palpable lymph nodes     SKIN:    CHEST/LUNG: No Chest deformity , no chest tenderness. bilateral breath sounds,no  adventitious sounds  HEART: RRR, no m/r/g   ABDOMEN: soft, nontender, nondistended; bowel sounds.  :hernandez catheter.  EXTREMITIES: pedal edema, no cyanosis, no clubbing.  NEURO: AA0X , mood/ affect-  , no focal neuro deficits          LABS/DIAGNOSTIC TESTS:                          9.9    8.27  )-----------( 380      ( 10 Apr 2019 07:40 )             33.8     WBC Count: 8.27 K/uL (04-10 @ 07:40)  WBC Count: 9.64 K/uL (04-09 @ 17:36)      04-10    135  |  97  |  39<H>  ----------------------------<  102<H>  4.4   |  29  |  7.87<H>    Ca    8.4      10 Apr 2019 07:40  Phos  7.7     04-10  Mg     2.3     04-10    TPro  7.1  /  Alb  2.8<L>  /  TBili  0.2  /  DBili  x   /  AST  24  /  ALT  25  /  AlkPhos  85  04-10          LIVER FUNCTIONS - ( 10 Apr 2019 07:40 )  Alb: 2.8 g/dL / Pro: 7.1 g/dL / ALK PHOS: 85 U/L / ALT: 25 U/L DA / AST: 24 U/L / GGT: x             PT/INR - ( 09 Apr 2019 17:36 )   PT: 11.8 sec;   INR: 1.06 ratio         PTT - ( 09 Apr 2019 17:36 )  PTT:34.4 sec    LACTATE:Lactate, Blood: 1.9 mmol/L (04-09 @ 17:36)      ABG -     CULTURES:   .Blood  03-27 @ 11:24   No growth at 5 days.  --  --      .Urine  03-01 @ 10:11   No growth  --  --      .Urine Clean Catch (Midstream)  01-29 @ 09:58   No growth  --  --          RADIOLOGY:    < from: Xray Chest 1 View AP/PA (04.09.19 @ 17:38) >    EXAM:  XR CHEST AP OR PA 1V                            PROCEDURE DATE:  04/09/2019          INTERPRETATION:  Chest portable.    Clinical History: Admission chest radiograph, left foot erythema.    Comparison: 3/27/2019.    Single AP view submitted.    The evaluation of the cardiomediastinal silhouette is limited on portable   technique.  There is a prominent cardiac silhouette.    There are low lung volumes resulting in crowding of the bronchovascular   markings at the lung bases.  No focal consolidation, effusion or pneumothorax is noted.    Impression:    Findings as discussed above.    < end of copied text >    < from: US Duplex Venous Lower Ext Complete, Bilateral (04.02.19 @ 16:28) >    EXAM:  US DPLX LWR EXT VEINS COMPL BI                            PROCEDURE DATE:  04/02/2019          INTERPRETATION:  Venous duplex Doppler ultrasound of both legs    Clinical Indication: Leg pain.    Findings: Venous duplex Doppler ultrasound with gray scale, color and   spectral Doppler analysis of both legs is performed. The bilateral common   femoral veins, superficial femoral veins, profunda femoral veins,   popliteal veins , posterior tibial veins and peroneal veins are   compressible with normal spectral Doppler phasicity and augmentation.    Impression: No evidence for deep vein thrombosis.      < end of copied text >      ROS  [  ] UNABLE TO ELICIT HPI:  38 Male with PMH of Type 2 DM, Migraine, ESRD on HD for 3 yrs (left arm graft, TTS), Morbid obesity, HTN, Chr dyspnea (uses home O2 prn) presented to hospital for left foot swelling and redness. Patient was in his usual state of health until today, started having this acute onset swelling, redness and 10/10 sharp pain on left foot. Difficulty walking due to pain. Went to HD, completed session and was told by nephrologist to come to ER.     History as stated above. As per patient he developed this sudden left foot swelling and pain, and has been unable to bear weight. denies fever chills, nausea, vomiting or any systemic symptoms. Also denies any trauma/ insect bites etc  Off note, also had a thigh infection 2 weeks ago, which has now resolved and completed abx course.   DVT has been ruled out on the left leg. BCs sent, pending. On VAnc        PAST MEDICAL & SURGICAL HISTORY:  Migraine  Obesity  ESRD (end stage renal disease)  HTN (hypertension)  DM (diabetes mellitus)  Bipolar disorder  Schizophrenia  ESRD on dialysis  Morbid obesity with BMI of 40.0-44.9, adult  Diabetes  H/O hernia repair  H/O hernia repair      aspirin (Short breath)  aspirin (Swelling)  penicillin (Short breath)  penicillins (Swelling)  shellfish (Unknown)      Meds:  clopidogrel Tablet 75 milliGRAM(s) Oral daily  gabapentin 400 milliGRAM(s) Oral daily  heparin  Injectable 5000 Unit(s) SubCutaneous every 12 hours  insulin glargine Injectable (LANTUS) 20 Unit(s) SubCutaneous at bedtime  insulin lispro (HumaLOG) corrective regimen sliding scale   SubCutaneous Before meals and at bedtime  insulin lispro Injectable (HumaLOG) 10 Unit(s) SubCutaneous three times a day before meals  lidocaine   Patch 1 Patch Transdermal once  melatonin 3 milliGRAM(s) Oral at bedtime  morphine  - Injectable 2 milliGRAM(s) IV Push every 6 hours PRN  oxyCODONE    5 mG/acetaminophen 325 mG 2 Tablet(s) Oral every 4 hours PRN  pantoprazole    Tablet 40 milliGRAM(s) Oral before breakfast  QUEtiapine 200 milliGRAM(s) Oral daily  sevelamer carbonate 800 milliGRAM(s) Oral three times a day with meals  simvastatin 40 milliGRAM(s) Oral at bedtime  vancomycin  IVPB 1000 milliGRAM(s) IV Intermittent <User Schedule>      SOCIAL HISTORY:  Smoker:  YES        ETOH use:  YES  Ilicit Drug use:  NO      FAMILY HISTORY:  Family history of COPD (chronic obstructive pulmonary disease)  Family history of diabetes mellitus      VITALS:  Vital Signs Last 24 Hrs  T(C): 36.3 (10 Apr 2019 05:45), Max: 36.9 (09 Apr 2019 14:24)  T(F): 97.3 (10 Apr 2019 05:45), Max: 98.4 (09 Apr 2019 14:24)  HR: 84 (10 Apr 2019 05:45) (84 - 104)  BP: 145/95 (10 Apr 2019 05:45) (142/87 - 161/97)  BP(mean): --  RR: 18 (10 Apr 2019 05:45) (17 - 18)  SpO2: 97% (10 Apr 2019 05:45) (96% - 98%)      REVIEW OF SYSTEMS:    CONSTITUTIONAL: No weakness, fevers or chills  EYES/ENT: No visual changes;  No vertigo or throat pain   NECK: No pain or stiffness  RESPIRATORY: No cough, wheezing, hemoptysis; No shortness of breath  CARDIOVASCULAR: No chest pain or palpitations  GASTROINTESTINAL: No abdominal or epigastric pain. No nausea, vomiting, or hematemesis; No diarrhea or constipation. No melena or hematochezia.  GENITOURINARY: No dysuria, frequency or hematuria  NEUROLOGICAL: No numbness or weakness  SKIN: swelling and pain on Left foot  EXTREMITIES: pain and swelling on left foot, unable to bear weight      GENERAL:   HEAD: atraumatic, normocephalic   EYES: PERRLA, white sclera.   ENMT: nasal mucosa- Oral cavity- moist  NECK: supple, no JVD, thyroid-   LYMPH: no palpable lymph nodes     SKIN: warm and dry  CHEST/LUNG: No Chest deformity , no chest tenderness. bilateral breath sounds,no  adventitious sounds  HEART: RRR, no m/r/g   ABDOMEN: soft, nontender, nondistended; bowel sounds.  EXTREMITIES: left foot is extremely tender to touch. swollen. minimal erythema but no warmth  NEURO: AA0X3 , mood/ affect-stable  , no focal neuro deficits          LABS/DIAGNOSTIC TESTS:                          9.9    8.27  )-----------( 380      ( 10 Apr 2019 07:40 )             33.8     WBC Count: 8.27 K/uL (04-10 @ 07:40)  WBC Count: 9.64 K/uL (04-09 @ 17:36)      04-10    135  |  97  |  39<H>  ----------------------------<  102<H>  4.4   |  29  |  7.87<H>    Ca    8.4      10 Apr 2019 07:40  Phos  7.7     04-10  Mg     2.3     04-10    TPro  7.1  /  Alb  2.8<L>  /  TBili  0.2  /  DBili  x   /  AST  24  /  ALT  25  /  AlkPhos  85  04-10          LIVER FUNCTIONS - ( 10 Apr 2019 07:40 )  Alb: 2.8 g/dL / Pro: 7.1 g/dL / ALK PHOS: 85 U/L / ALT: 25 U/L DA / AST: 24 U/L / GGT: x             PT/INR - ( 09 Apr 2019 17:36 )   PT: 11.8 sec;   INR: 1.06 ratio         PTT - ( 09 Apr 2019 17:36 )  PTT:34.4 sec    LACTATE:Lactate, Blood: 1.9 mmol/L (04-09 @ 17:36)      ABG -     CULTURES:   .Blood  03-27 @ 11:24   No growth at 5 days.  --  --      .Urine  03-01 @ 10:11   No growth  --  --      .Urine Clean Catch (Midstream)  01-29 @ 09:58   No growth  --  --          RADIOLOGY:    < from: Xray Chest 1 View AP/PA (04.09.19 @ 17:38) >    EXAM:  XR CHEST AP OR PA 1V                            PROCEDURE DATE:  04/09/2019          INTERPRETATION:  Chest portable.    Clinical History: Admission chest radiograph, left foot erythema.    Comparison: 3/27/2019.    Single AP view submitted.    The evaluation of the cardiomediastinal silhouette is limited on portable   technique.  There is a prominent cardiac silhouette.    There are low lung volumes resulting in crowding of the bronchovascular   markings at the lung bases.  No focal consolidation, effusion or pneumothorax is noted.    Impression:    Findings as discussed above.    < end of copied text >    < from: US Duplex Venous Lower Ext Complete, Bilateral (04.02.19 @ 16:28) >    EXAM:  US DPLX LWR EXT VEINS COMPL BI                            PROCEDURE DATE:  04/02/2019          INTERPRETATION:  Venous duplex Doppler ultrasound of both legs    Clinical Indication: Leg pain.    Findings: Venous duplex Doppler ultrasound with gray scale, color and   spectral Doppler analysis of both legs is performed. The bilateral common   femoral veins, superficial femoral veins, profunda femoral veins,   popliteal veins , posterior tibial veins and peroneal veins are   compressible with normal spectral Doppler phasicity and augmentation.    Impression: No evidence for deep vein thrombosis.      < end of copied text >      ROS  [  ] UNABLE TO ELICIT HPI:  38 Male with PMH of Type 2 DM, Migraine, ESRD on HD for 3 yrs (left arm graft, TTS), Morbid obesity, HTN, Chr dyspnea (uses home O2 prn) presented to hospital for left foot swelling and redness. Patient was in his usual state of health until today, started having this acute onset swelling, redness and 10/10 sharp pain on left foot. Difficulty walking due to pain. Went to HD, completed session and was told by nephrologist to come to ER.     History as stated above. As per patient he developed this sudden left foot swelling and pain, and has been unable to bear weight. denies fever chills, nausea, vomiting or any systemic symptoms. Also denies any trauma/ insect bites etc  Off note, also had a thigh infection 2 weeks ago, which has now resolved and completed abx course.   DVT has been ruled out on the left leg. BCs sent, pending. On Vancomycin iv during dialysis.  He has no h/o gout, no fevers or chills and denies trauma to his left foot.        PAST MEDICAL & SURGICAL HISTORY:  Migraine  Obesity  ESRD (end stage renal disease)  HTN (hypertension)  DM (diabetes mellitus)  Bipolar disorder  Schizophrenia  ESRD on dialysis  Morbid obesity with BMI of 40.0-44.9, adult  Diabetes  H/O hernia repair  H/O hernia repair      aspirin (Short breath)  aspirin (Swelling)  penicillin (Short breath)  penicillins (Swelling)  shellfish (Unknown)      Meds:  clopidogrel Tablet 75 milliGRAM(s) Oral daily  gabapentin 400 milliGRAM(s) Oral daily  heparin  Injectable 5000 Unit(s) SubCutaneous every 12 hours  insulin glargine Injectable (LANTUS) 20 Unit(s) SubCutaneous at bedtime  insulin lispro (HumaLOG) corrective regimen sliding scale   SubCutaneous Before meals and at bedtime  insulin lispro Injectable (HumaLOG) 10 Unit(s) SubCutaneous three times a day before meals  lidocaine   Patch 1 Patch Transdermal once  melatonin 3 milliGRAM(s) Oral at bedtime  morphine  - Injectable 2 milliGRAM(s) IV Push every 6 hours PRN  oxyCODONE    5 mG/acetaminophen 325 mG 2 Tablet(s) Oral every 4 hours PRN  pantoprazole    Tablet 40 milliGRAM(s) Oral before breakfast  QUEtiapine 200 milliGRAM(s) Oral daily  sevelamer carbonate 800 milliGRAM(s) Oral three times a day with meals  simvastatin 40 milliGRAM(s) Oral at bedtime  vancomycin  IVPB 1000 milliGRAM(s) IV Intermittent <User Schedule>      SOCIAL HISTORY:  Smoker:  YES        ETOH use:  YES  Ilicit Drug use:  NO      FAMILY HISTORY:  Family history of COPD (chronic obstructive pulmonary disease)  Family history of diabetes mellitus      VITALS:  Vital Signs Last 24 Hrs  T(C): 36.3 (10 Apr 2019 05:45), Max: 36.9 (09 Apr 2019 14:24)  T(F): 97.3 (10 Apr 2019 05:45), Max: 98.4 (09 Apr 2019 14:24)  HR: 84 (10 Apr 2019 05:45) (84 - 104)  BP: 145/95 (10 Apr 2019 05:45) (142/87 - 161/97)  BP(mean): --  RR: 18 (10 Apr 2019 05:45) (17 - 18)  SpO2: 97% (10 Apr 2019 05:45) (96% - 98%)      REVIEW OF SYSTEMS:    CONSTITUTIONAL: No weakness, fevers or chills  EYES/ENT: No visual changes;  No vertigo or throat pain   NECK: No pain or stiffness  RESPIRATORY: No cough, wheezing, hemoptysis; No shortness of breath  CARDIOVASCULAR: No chest pain or palpitations  GASTROINTESTINAL: No abdominal or epigastric pain. No nausea, vomiting, or hematemesis; No diarrhea or constipation. No melena or hematochezia.  GENITOURINARY: No dysuria, frequency or hematuria  NEUROLOGICAL: No numbness or weakness  SKIN: swelling and pain on Left foot  EXTREMITIES: pain and swelling on left foot, unable to bear weight      GENERAL:   HEAD: atraumatic, normocephalic   EYES: PERRLA, white sclera.   ENMT: nasal mucosa- Oral cavity- moist  NECK: supple, no JVD, thyroid-   LYMPH: no palpable lymph nodes     SKIN: warm and dry  CHEST/LUNG: No Chest deformity , no chest tenderness. bilateral breath sounds equal , no  adventitious sounds  HEART: RRR, no m/r/g   ABDOMEN: soft, nontender, nondistended; bowel sounds.  EXTREMITIES: left foot is tender to touch. swollen. No erythema or warmth of foot but it is edematous, no open wounds of skin.  NEURO: AA0X3 , mood/ affect-stable  , no focal neuro deficits          LABS/DIAGNOSTIC TESTS:                          9.9    8.27  )-----------( 380      ( 10 Apr 2019 07:40 )             33.8     WBC Count: 8.27 K/uL (04-10 @ 07:40)  WBC Count: 9.64 K/uL (04-09 @ 17:36)      04-10    135  |  97  |  39<H>  ----------------------------<  102<H>  4.4   |  29  |  7.87<H>    Ca    8.4      10 Apr 2019 07:40  Phos  7.7     04-10  Mg     2.3     04-10    TPro  7.1  /  Alb  2.8<L>  /  TBili  0.2  /  DBili  x   /  AST  24  /  ALT  25  /  AlkPhos  85  04-10          LIVER FUNCTIONS - ( 10 Apr 2019 07:40 )  Alb: 2.8 g/dL / Pro: 7.1 g/dL / ALK PHOS: 85 U/L / ALT: 25 U/L DA / AST: 24 U/L / GGT: x             PT/INR - ( 09 Apr 2019 17:36 )   PT: 11.8 sec;   INR: 1.06 ratio         PTT - ( 09 Apr 2019 17:36 )  PTT:34.4 sec    LACTATE:Lactate, Blood: 1.9 mmol/L (04-09 @ 17:36)      ABG -     CULTURES:   .Blood  03-27 @ 11:24   No growth at 5 days.  --  --      .Urine  03-01 @ 10:11   No growth  --  --      .Urine Clean Catch (Midstream)  01-29 @ 09:58   No growth  --  --          RADIOLOGY:    < from: Xray Chest 1 View AP/PA (04.09.19 @ 17:38) >    EXAM:  XR CHEST AP OR PA 1V                            PROCEDURE DATE:  04/09/2019          INTERPRETATION:  Chest portable.    Clinical History: Admission chest radiograph, left foot erythema.    Comparison: 3/27/2019.    Single AP view submitted.    The evaluation of the cardiomediastinal silhouette is limited on portable   technique.  There is a prominent cardiac silhouette.    There are low lung volumes resulting in crowding of the bronchovascular   markings at the lung bases.  No focal consolidation, effusion or pneumothorax is noted.    Impression:    Findings as discussed above.    < end of copied text >    < from: US Duplex Venous Lower Ext Complete, Bilateral (04.02.19 @ 16:28) >    EXAM:  US DPLX LWR EXT VEINS COMPL BI                            PROCEDURE DATE:  04/02/2019          INTERPRETATION:  Venous duplex Doppler ultrasound of both legs    Clinical Indication: Leg pain.    Findings: Venous duplex Doppler ultrasound with gray scale, color and   spectral Doppler analysis of both legs is performed. The bilateral common   femoral veins, superficial femoral veins, profunda femoral veins,   popliteal veins , posterior tibial veins and peroneal veins are   compressible with normal spectral Doppler phasicity and augmentation.    Impression: No evidence for deep vein thrombosis.      < end of copied text >      ROS  [  ] UNABLE TO ELICIT

## 2019-04-10 NOTE — CHART NOTE - NSCHARTNOTEFT_GEN_A_CORE
EVENT: Cursing loudly before trying percocet for pain management saying it does not work. Encouraged to try meds as ordered and for reevaluation per hospital policy.    OBJECTIVE:  Vital Signs Last 24 Hrs  T(C): 36.9 (09 Apr 2019 14:24), Max: 36.9 (09 Apr 2019 14:24)  T(F): 98.4 (09 Apr 2019 14:24), Max: 98.4 (09 Apr 2019 14:24)  HR: 99 (09 Apr 2019 19:55) (99 - 104)  BP: 157/90 (09 Apr 2019 19:55) (148/92 - 157/90)  BP(mean): --  RR: 17 (09 Apr 2019 19:55) (17 - 18)  SpO2: 96% (09 Apr 2019 19:55) (96% - 98%)    FOCUSED PHYSICAL EXAM:   Neuro: alert, oriented X 3  Resp: unlabored, clear lung sounds  Cardiac: S1 S2 regular rate  Extremities: AROM, left foot swelling and redness.   LABS:                        9.9    9.64  )-----------( 431      ( 09 Apr 2019 17:36 )             32.6   CARDIAC MARKERS ( 09 Apr 2019 17:36 )  x     / x     / 129 U/L / x     / x        04-09    141  |  99  |  31<H>  ----------------------------<  130<H>  4.3   |  36<H>  |  6.98<H>    Ca    8.1<L>      09 Apr 2019 17:36  Mg     2.0     04-09    TPro  7.4  /  Alb  2.8<L>  /  TBili  0.2  /  DBili  x   /  AST  25  /  ALT  28  /  AlkPhos  96  04-09      ASSESSMENT:  HPI:  38 Male with PMH of Type 2 DM, Migraine, ESRD on HD for 3 yrs (left arm graft, TTS), Morbid obesity, HTN, Chr dyspnea (uses home O2 prn) presented to hospital for left foot swelling and redness. Patient was in his usual state of health until today, started having this acute onset swelling, redness and 10/10 sharp pain on left foot. Difficulty walking due to pain. Went to HD, completed session and was told by nephrologist to come to ER.   Denies any fever, chills, nausea, vomiting or any systemic symptoms.   Off note, also had a thigh infection 2 weeks ago, which has now resolved and completed abx course.     In Ed, BP: 148/92mm hg, Hr: 104, Temp: 98.4 F  cbc shows no white count, hb stable from before.   Bmp shows elevated bun/creatinine (09 Apr 2019 20:04)    PLAN:   1. Percocet for pain management as ordered  2, Pain reassessment per hospital policy    FOLLOW UP / RESULT:

## 2019-04-10 NOTE — CONSULT NOTE ADULT - ASSESSMENT
# ESRD HD in AM.  # LLE cellulitis. con ABX  #HTN controlled  #anemia of CKD. procrit on HD  #renal osteodystrophy cont renvela   Many thanks for the consult
?Cellulitis of left foot - he has swelling but no warmth or redness - might be gout ?  No fever or Leukocytosis    Plan - increase vancomycin to 1250mgs iv 3x week with dialysis for a total of 3 doses only  check uric acid level   dc planning if blood cultures negative.

## 2019-04-11 LAB
GLUCOSE BLDC GLUCOMTR-MCNC: 106 MG/DL — HIGH (ref 70–99)
GLUCOSE BLDC GLUCOMTR-MCNC: 119 MG/DL — HIGH (ref 70–99)
GLUCOSE BLDC GLUCOMTR-MCNC: 129 MG/DL — HIGH (ref 70–99)
GLUCOSE BLDC GLUCOMTR-MCNC: 89 MG/DL — SIGNIFICANT CHANGE UP (ref 70–99)

## 2019-04-11 PROCEDURE — 73718 MRI LOWER EXTREMITY W/O DYE: CPT | Mod: 26,LT

## 2019-04-11 RX ORDER — OXYCODONE AND ACETAMINOPHEN 5; 325 MG/1; MG/1
1 TABLET ORAL ONCE
Qty: 0 | Refills: 0 | Status: DISCONTINUED | OUTPATIENT
Start: 2019-04-11 | End: 2019-04-12

## 2019-04-11 RX ADMIN — QUETIAPINE FUMARATE 200 MILLIGRAM(S): 200 TABLET, FILM COATED ORAL at 12:07

## 2019-04-11 RX ADMIN — Medication 3 MILLIGRAM(S): at 23:02

## 2019-04-11 RX ADMIN — INSULIN GLARGINE 10 UNIT(S): 100 INJECTION, SOLUTION SUBCUTANEOUS at 23:05

## 2019-04-11 RX ADMIN — Medication 166.67 MILLIGRAM(S): at 12:06

## 2019-04-11 RX ADMIN — SIMVASTATIN 40 MILLIGRAM(S): 20 TABLET, FILM COATED ORAL at 23:02

## 2019-04-11 RX ADMIN — ERYTHROPOIETIN 4000 UNIT(S): 10000 INJECTION, SOLUTION INTRAVENOUS; SUBCUTANEOUS at 20:24

## 2019-04-11 RX ADMIN — SEVELAMER CARBONATE 800 MILLIGRAM(S): 2400 POWDER, FOR SUSPENSION ORAL at 12:07

## 2019-04-11 RX ADMIN — SEVELAMER CARBONATE 800 MILLIGRAM(S): 2400 POWDER, FOR SUSPENSION ORAL at 18:03

## 2019-04-11 RX ADMIN — CLOPIDOGREL BISULFATE 75 MILLIGRAM(S): 75 TABLET, FILM COATED ORAL at 12:06

## 2019-04-11 RX ADMIN — PANTOPRAZOLE SODIUM 40 MILLIGRAM(S): 20 TABLET, DELAYED RELEASE ORAL at 08:57

## 2019-04-11 RX ADMIN — SEVELAMER CARBONATE 800 MILLIGRAM(S): 2400 POWDER, FOR SUSPENSION ORAL at 08:57

## 2019-04-11 RX ADMIN — Medication 5 UNIT(S): at 12:07

## 2019-04-11 RX ADMIN — Medication 5 UNIT(S): at 08:56

## 2019-04-11 NOTE — CONSULT NOTE ADULT - SUBJECTIVE AND OBJECTIVE BOX
Patient is a 38y old  Male who presents with a chief complaint of left leg swelling , redness       HPI:  Patient is a 37 y/o M from Home with History of ESRD on HD TTS via left arm AV fistula/graft x 2 years, HTN, HLD, DM, chronic back pain presented with complaint of left foot and leg swelling and redness. . Patient also complaints of chills , nausea , NBNP vomiting. No fever, no chest pain , no sob , no dizziness , no headache.     Pt seen bedside. Pt states his left foot and leg has been red and swollen for a few days. Pt states his left foot is very painful    PMH: ESRD on dialysis  Morbid obesity with BMI of 40.0-44.9, adult  Diabetes    Allergies: aspirin (Swelling)  penicillins (Swelling)  shellfish (Unknown)    Medications: acetaminophen   Tablet .. 650 milliGRAM(s) Oral every 6 hours PRN  cinacalcet 30 milliGRAM(s) Oral daily  clindamycin IVPB 600 milliGRAM(s) IV Intermittent every 8 hours  escitalopram 10 milliGRAM(s) Oral daily  gabapentin 400 milliGRAM(s) Oral two times a day  HYDROmorphone   Tablet 2 milliGRAM(s) Oral every 6 hours PRN  influenza   Vaccine 0.5 milliLiter(s) IntraMuscular once  insulin glargine Injectable (LANTUS) 15 Unit(s) SubCutaneous at bedtime  insulin lispro (HumaLOG) corrective regimen sliding scale   SubCutaneous Before meals and at bedtime  insulin lispro Injectable (HumaLOG) 5 Unit(s) SubCutaneous three times a day before meals  mirtazapine 45 milliGRAM(s) Oral daily  pantoprazole    Tablet 40 milliGRAM(s) Oral before breakfast  QUEtiapine 200 milliGRAM(s) Oral daily  sevelamer hydrochloride 1600 milliGRAM(s) Oral every 8 hours  simvastatin 40 milliGRAM(s) Oral at bedtime    FH:Family history of diabetes mellitus (Mother)    PSX: H/O hernia repair    SH: acetaminophen   Tablet .. 650 milliGRAM(s) Oral every 6 hours PRN  cinacalcet 30 milliGRAM(s) Oral daily  clindamycin IVPB 600 milliGRAM(s) IV Intermittent every 8 hours  escitalopram 10 milliGRAM(s) Oral daily  gabapentin 400 milliGRAM(s) Oral two times a day  HYDROmorphone   Tablet 2 milliGRAM(s) Oral every 6 hours PRN  influenza   Vaccine 0.5 milliLiter(s) IntraMuscular once  insulin glargine Injectable (LANTUS) 15 Unit(s) SubCutaneous at bedtime  insulin lispro (HumaLOG) corrective regimen sliding scale   SubCutaneous Before meals and at bedtime  insulin lispro Injectable (HumaLOG) 5 Unit(s) SubCutaneous three times a day before meals  mirtazapine 45 milliGRAM(s) Oral daily  pantoprazole    Tablet 40 milliGRAM(s) Oral before breakfast  QUEtiapine 200 milliGRAM(s) Oral daily  sevelamer hydrochloride 1600 milliGRAM(s) Oral every 8 hours  simvastatin 40 milliGRAM(s) Oral at bedtime      Vital Signs Last 24 Hrs  T(C): 37.4 (30 Nov 2018 05:03), Max: 37.4 (30 Nov 2018 05:03)  T(F): 99.4 (30 Nov 2018 05:03), Max: 99.4 (30 Nov 2018 05:03)  HR: 88 (30 Nov 2018 05:03) (71 - 92)  BP: 137/68 (30 Nov 2018 05:03) (137/68 - 186/110)  BP(mean): --  RR: 20 (30 Nov 2018 05:03) (16 - 20)  SpO2: 96% (30 Nov 2018 05:03) (96% - 100%)    LABS:                          9.9    8.27  )-----------( 380      ( 10 Apr 2019 07:40 )             33.8     04-10    135  |  97  |  39<H>  ----------------------------<  102<H>  4.4   |  29  |  7.87<H>    Ca    8.4      10 Apr 2019 07:40  Phos  7.7     04-10  Mg     2.3     04-10    TPro  7.1  /  Alb  2.8<L>  /  TBili  0.2  /  DBili  x   /  AST  24  /  ALT  25  /  AlkPhos  85  04-10        PHYSICAL EXAM  GEN: PRESTON JOHNSON JR is a pleasant well-nourished, well developed 38y Male in no acute distress, alert awake, and oriented to person, place and time.   LE Focused:    Vasc:  DP/PT palpable, CFT <3 sec, TG warm to cool, Edema to left foot and leg  Derm: No IDM, no open lesions, no clinical signs of infection  Neuro: Protective sensation intact      < from: Xray Tibia + Fibula 2 Views, Right (11.29.18 @ 20:13) >    EXAM:  LEG AP&LAT - RIGHT                            PROCEDURE DATE:  11/29/2018          INTERPRETATION:  CLINICAL INDICATION:  Right lower extremity cellulitis;   evaluate for osteomyelitis.    COMPARISON:  None    TECHNIQUE:  AP and lateral radiographs of the right lower leg performed.    FINDINGS:  There is no evidence for acute or chronic fracture deformity   of the right tibia or fibula. No regions of osteolysis or osteosclerosis   noted. Knee and ankle articulations appear intact. Soft tissue   calcifications are evident. No retained metallic foreign bodies are noted.    IMPRESSION:  As above.        LISA KAPLAN   This document has been electronically signed. Nov 29 2018  8:25PM     ----------------------------------------------------------------    < from: Xray Foot AP + Lateral, Left (04.09.19 @ 20:38) >  Di  EXAM:  FOOT 2VIEWS LT                            PROCEDURE DATE:  04/09/2019          INTERPRETATION:  Frontal and lateral views of the left foot dated   4/9/2019.    CLINICAL INFORMATION: Left foot infection.    The study is correlated with a prior exam from 12/17/2015.    FINDINGS:    There is mild soft tissue swelling, particularly over the dorsum of the   forefoot. There is no acute fracture or dislocation. There is no obvious   cortical erosion or periosteal reaction. There is no subcutaneous gas or   radiopaque foreign body.    IMPRESSION:    Soft tissue swelling. No obvious cortical erosion or periosteal reaction.   No subcutaneous gas or radiopaque foreign body.    < end of copied text >      A:  L foot swelling and cellulitis    P:  Patient evaluated, chart reviewed.   X-ray reviewed as above  Recommend MRI to rule out absess   IV abx per ID   no dressing needed at this time  Podiatry to follow while in house  Discussed with attending Dr. Farrell   Seen With Dr. Tse

## 2019-04-11 NOTE — PROGRESS NOTE ADULT - ATTENDING COMMENTS
kenny nd examined  physical unchanged  vsstable afebril  still c/o pain in foot and asking more pain mes  alyhough swelling tenderness of foot is much better   seen by podiatry mri of foot  ID on case

## 2019-04-12 ENCOUNTER — TRANSCRIPTION ENCOUNTER (OUTPATIENT)
Age: 39
End: 2019-04-12

## 2019-04-12 VITALS
SYSTOLIC BLOOD PRESSURE: 138 MMHG | OXYGEN SATURATION: 96 % | HEART RATE: 96 BPM | RESPIRATION RATE: 16 BRPM | DIASTOLIC BLOOD PRESSURE: 75 MMHG | TEMPERATURE: 98 F

## 2019-04-12 DIAGNOSIS — M79.672 PAIN IN LEFT FOOT: ICD-10-CM

## 2019-04-12 DIAGNOSIS — L03.90 CELLULITIS, UNSPECIFIED: ICD-10-CM

## 2019-04-12 LAB
ANION GAP SERPL CALC-SCNC: 8 MMOL/L — SIGNIFICANT CHANGE UP (ref 5–17)
BUN SERPL-MCNC: 43 MG/DL — HIGH (ref 7–18)
CALCIUM SERPL-MCNC: 8.6 MG/DL — SIGNIFICANT CHANGE UP (ref 8.4–10.5)
CHLORIDE SERPL-SCNC: 98 MMOL/L — SIGNIFICANT CHANGE UP (ref 96–108)
CO2 SERPL-SCNC: 30 MMOL/L — SIGNIFICANT CHANGE UP (ref 22–31)
CREAT SERPL-MCNC: 8.97 MG/DL — HIGH (ref 0.5–1.3)
GLUCOSE BLDC GLUCOMTR-MCNC: 114 MG/DL — HIGH (ref 70–99)
GLUCOSE BLDC GLUCOMTR-MCNC: 120 MG/DL — HIGH (ref 70–99)
GLUCOSE BLDC GLUCOMTR-MCNC: 152 MG/DL — HIGH (ref 70–99)
GLUCOSE SERPL-MCNC: 116 MG/DL — HIGH (ref 70–99)
HCT VFR BLD CALC: 32 % — LOW (ref 39–50)
HGB BLD-MCNC: 9.7 G/DL — LOW (ref 13–17)
MCHC RBC-ENTMCNC: 30.3 GM/DL — LOW (ref 32–36)
MCHC RBC-ENTMCNC: 31.1 PG — SIGNIFICANT CHANGE UP (ref 27–34)
MCV RBC AUTO: 102.6 FL — HIGH (ref 80–100)
NRBC # BLD: 0 /100 WBCS — SIGNIFICANT CHANGE UP (ref 0–0)
PLATELET # BLD AUTO: 352 K/UL — SIGNIFICANT CHANGE UP (ref 150–400)
POTASSIUM SERPL-MCNC: 4.6 MMOL/L — SIGNIFICANT CHANGE UP (ref 3.5–5.3)
POTASSIUM SERPL-SCNC: 4.6 MMOL/L — SIGNIFICANT CHANGE UP (ref 3.5–5.3)
RBC # BLD: 3.12 M/UL — LOW (ref 4.2–5.8)
RBC # FLD: 14.9 % — HIGH (ref 10.3–14.5)
SODIUM SERPL-SCNC: 136 MMOL/L — SIGNIFICANT CHANGE UP (ref 135–145)
WBC # BLD: 10.03 K/UL — SIGNIFICANT CHANGE UP (ref 3.8–10.5)
WBC # FLD AUTO: 10.03 K/UL — SIGNIFICANT CHANGE UP (ref 3.8–10.5)

## 2019-04-12 PROCEDURE — 99285 EMERGENCY DEPT VISIT HI MDM: CPT | Mod: 25

## 2019-04-12 PROCEDURE — 82009 KETONE BODYS QUAL: CPT

## 2019-04-12 PROCEDURE — 80307 DRUG TEST PRSMV CHEM ANLYZR: CPT

## 2019-04-12 PROCEDURE — 36415 COLL VENOUS BLD VENIPUNCTURE: CPT

## 2019-04-12 PROCEDURE — 82550 ASSAY OF CK (CPK): CPT

## 2019-04-12 PROCEDURE — 80053 COMPREHEN METABOLIC PANEL: CPT

## 2019-04-12 PROCEDURE — 96374 THER/PROPH/DIAG INJ IV PUSH: CPT

## 2019-04-12 PROCEDURE — 96375 TX/PRO/DX INJ NEW DRUG ADDON: CPT | Mod: 76

## 2019-04-12 PROCEDURE — 85610 PROTHROMBIN TIME: CPT

## 2019-04-12 PROCEDURE — 83605 ASSAY OF LACTIC ACID: CPT

## 2019-04-12 PROCEDURE — 82962 GLUCOSE BLOOD TEST: CPT

## 2019-04-12 PROCEDURE — 85027 COMPLETE CBC AUTOMATED: CPT

## 2019-04-12 PROCEDURE — 99261: CPT

## 2019-04-12 PROCEDURE — 73718 MRI LOWER EXTREMITY W/O DYE: CPT

## 2019-04-12 PROCEDURE — 71045 X-RAY EXAM CHEST 1 VIEW: CPT

## 2019-04-12 PROCEDURE — 80048 BASIC METABOLIC PNL TOTAL CA: CPT

## 2019-04-12 PROCEDURE — 87040 BLOOD CULTURE FOR BACTERIA: CPT

## 2019-04-12 PROCEDURE — 83735 ASSAY OF MAGNESIUM: CPT

## 2019-04-12 PROCEDURE — 85730 THROMBOPLASTIN TIME PARTIAL: CPT

## 2019-04-12 PROCEDURE — 83880 ASSAY OF NATRIURETIC PEPTIDE: CPT

## 2019-04-12 PROCEDURE — 84100 ASSAY OF PHOSPHORUS: CPT

## 2019-04-12 PROCEDURE — 73620 X-RAY EXAM OF FOOT: CPT

## 2019-04-12 PROCEDURE — 93005 ELECTROCARDIOGRAM TRACING: CPT

## 2019-04-12 RX ORDER — METHOCARBAMOL 500 MG/1
500 TABLET, FILM COATED ORAL ONCE
Qty: 0 | Refills: 0 | Status: COMPLETED | OUTPATIENT
Start: 2019-04-12 | End: 2019-04-12

## 2019-04-12 RX ORDER — ACETAMINOPHEN 500 MG
2 TABLET ORAL
Qty: 30 | Refills: 0 | OUTPATIENT
Start: 2019-04-12 | End: 2019-04-16

## 2019-04-12 RX ORDER — VANCOMYCIN HCL 1 G
1.25 VIAL (EA) INTRAVENOUS
Qty: 1.25 | Refills: 0 | OUTPATIENT
Start: 2019-04-12 | End: 2019-04-12

## 2019-04-12 RX ORDER — MORPHINE SULFATE 50 MG/1
2 CAPSULE, EXTENDED RELEASE ORAL ONCE
Qty: 0 | Refills: 0 | Status: DISCONTINUED | OUTPATIENT
Start: 2019-04-12 | End: 2019-04-12

## 2019-04-12 RX ORDER — METHOCARBAMOL 500 MG/1
500 TABLET, FILM COATED ORAL
Qty: 0 | Refills: 0 | Status: DISCONTINUED | OUTPATIENT
Start: 2019-04-12 | End: 2019-04-12

## 2019-04-12 RX ADMIN — MORPHINE SULFATE 2 MILLIGRAM(S): 50 CAPSULE, EXTENDED RELEASE ORAL at 00:56

## 2019-04-12 RX ADMIN — SEVELAMER CARBONATE 800 MILLIGRAM(S): 2400 POWDER, FOR SUSPENSION ORAL at 12:22

## 2019-04-12 RX ADMIN — Medication 5 UNIT(S): at 08:36

## 2019-04-12 RX ADMIN — METHOCARBAMOL 500 MILLIGRAM(S): 500 TABLET, FILM COATED ORAL at 18:44

## 2019-04-12 RX ADMIN — Medication 5 UNIT(S): at 12:28

## 2019-04-12 RX ADMIN — Medication 400 MILLIGRAM(S): at 02:21

## 2019-04-12 RX ADMIN — HEPARIN SODIUM 5000 UNIT(S): 5000 INJECTION INTRAVENOUS; SUBCUTANEOUS at 06:08

## 2019-04-12 RX ADMIN — HEPARIN SODIUM 5000 UNIT(S): 5000 INJECTION INTRAVENOUS; SUBCUTANEOUS at 18:43

## 2019-04-12 RX ADMIN — Medication 5 UNIT(S): at 17:37

## 2019-04-12 RX ADMIN — SEVELAMER CARBONATE 800 MILLIGRAM(S): 2400 POWDER, FOR SUSPENSION ORAL at 18:43

## 2019-04-12 RX ADMIN — Medication 1: at 12:28

## 2019-04-12 RX ADMIN — SEVELAMER CARBONATE 800 MILLIGRAM(S): 2400 POWDER, FOR SUSPENSION ORAL at 08:37

## 2019-04-12 RX ADMIN — MORPHINE SULFATE 2 MILLIGRAM(S): 50 CAPSULE, EXTENDED RELEASE ORAL at 01:15

## 2019-04-12 RX ADMIN — Medication 1000 MILLIGRAM(S): at 03:05

## 2019-04-12 RX ADMIN — QUETIAPINE FUMARATE 200 MILLIGRAM(S): 200 TABLET, FILM COATED ORAL at 12:22

## 2019-04-12 RX ADMIN — PANTOPRAZOLE SODIUM 40 MILLIGRAM(S): 20 TABLET, DELAYED RELEASE ORAL at 06:08

## 2019-04-12 RX ADMIN — METHOCARBAMOL 500 MILLIGRAM(S): 500 TABLET, FILM COATED ORAL at 13:01

## 2019-04-12 RX ADMIN — CLOPIDOGREL BISULFATE 75 MILLIGRAM(S): 75 TABLET, FILM COATED ORAL at 12:22

## 2019-04-12 NOTE — PROGRESS NOTE ADULT - PROBLEM SELECTOR PLAN 6
IMPROVE VTE Individual Risk Assessment          RISK                                                          Points  [  ] Previous VTE                                                3  [  ] Thrombophilia                                             2  [  ] Lower limb paralysis                                   2        (unable to hold up >15 seconds)    [  ] Current Cancer                                             2         (within 6 months)  [  ] Immobilization > 24 hrs                              1  [  ] ICU/CCU stay > 24 hours                             1  [  ] Age > 60                                                         1    IMPROVE VTE Score: Pt started on Heparin s/c considering immobility and rf( obesity, esrd)

## 2019-04-12 NOTE — CONSULT NOTE ADULT - SUBJECTIVE AND OBJECTIVE BOX
Chief Complaint: "Left leg pain"    HPI:  38 Male with PMH of Type 2 DM, Migraine, ESRD on HD for 3 yrs (left arm graft, TTS), Morbid obesity, HTN, Chr dyspnea (uses home O2 prn) presented to hospital for left foot swelling and redness. Patient was in his usual state of health until today, started having this acute onset swelling, redness and 10/10 sharp pain on left foot. Difficulty walking due to pain. Went to HD, completed session and was told by nephrologist to come to ER.   Denies any fever, chills, nausea, vomiting or any systemic symptoms.   Off note, also had a thigh infection 2 weeks ago, which has now resolved and completed abx course.     In Ed, BP: 148/92mm hg, Hr: 104, Temp: 98.4 F  cbc shows no white count, hb stable from before.   Bmp shows elevated bun/creatinine (09 Apr 2019 20:04)    38 year old male treated for left leg cellulitis. Pt complaining of left leg pain.  Pt demanding iv Dilaudid .  Pt educated that iv Dilaudid is not appropriate.  Pt advised to take percocet and Robaxin for pain.  Pt has frequently admissions for similar reasons.  Pt currently on vanco iv.  Pt is stable for discharge.x   PAST MEDICAL & SURGICAL HISTORY:  Migraine  Obesity  ESRD (end stage renal disease)  HTN (hypertension)  DM (diabetes mellitus)  Bipolar disorder  Schizophrenia  ESRD on dialysis  Morbid obesity with BMI of 40.0-44.9, adult  Diabetes  H/O hernia repair  H/O hernia repair      FAMILY HISTORY:  Family history of COPD (chronic obstructive pulmonary disease)  Family history of diabetes mellitus      SOCIAL HISTORY:  [ ] Denies Smoking, Alcohol, or Drug Use    Allergies    aspirin (Short breath)  aspirin (Swelling)  penicillin (Short breath)  penicillins (Swelling)  shellfish (Unknown)    Intolerances        PAIN MEDICATIONS:  melatonin 3 milliGRAM(s) Oral at bedtime  oxyCODONE    5 mG/acetaminophen 325 mG 1 Tablet(s) Oral once  QUEtiapine 200 milliGRAM(s) Oral daily      Heme:  clopidogrel Tablet 75 milliGRAM(s) Oral daily  heparin  Injectable 5000 Unit(s) SubCutaneous every 12 hours    Antibiotics:  vancomycin  IVPB 1250 milliGRAM(s) IV Intermittent <User Schedule>    Cardiovascular:    GI:  pantoprazole    Tablet 40 milliGRAM(s) Oral before breakfast    Endocrine:  insulin glargine Injectable (LANTUS) 10 Unit(s) SubCutaneous at bedtime  insulin lispro (HumaLOG) corrective regimen sliding scale   SubCutaneous Before meals and at bedtime  insulin lispro Injectable (HumaLOG) 5 Unit(s) SubCutaneous three times a day before meals  simvastatin 40 milliGRAM(s) Oral at bedtime    All Other Medications:  epoetin cyndie Injectable 4000 Unit(s) IV Push <User Schedule>  lidocaine   Patch 1 Patch Transdermal once  sevelamer carbonate 800 milliGRAM(s) Oral three times a day with meals      REVIEW OF SYSTEMS:    CONSTITUTIONAL: No fever, weight loss, or fatigue, obse  RESPIRATORY: No cough, wheezing, chills or hemoptysis; No shortness of breath  CARDIOVASCULAR: No chest pain, palpitations, dizziness, or leg swelling  GASTROINTESTINAL: No abdominal or epigastric pain. No nausea, vomiting, or hematemesis; No diarrhea or constipation  GENITOURINARY: esrd - on hd  NEUROLOGICAL: No headaches, memory loss, loss of strength, numbness, or tremors  MUSCULOSKELETAL: +left foot edema left foot pain      Vital Signs Last 24 Hrs  T(C): 36.6 (12 Apr 2019 14:35), Max: 37.4 (11 Apr 2019 22:43)  T(F): 97.8 (12 Apr 2019 14:35), Max: 99.3 (11 Apr 2019 22:43)  HR: 96 (12 Apr 2019 14:35) (92 - 107)  BP: 138/75 (12 Apr 2019 14:35) (137/72 - 170/79)  BP(mean): --  RR: 16 (12 Apr 2019 14:35) (16 - 19)  SpO2: 96% (12 Apr 2019 14:35) (95% - 100%)    PAIN SCORE:    5/10     SCALE USED: (1-10 VNRS)             PHYSICAL EXAM:    GENERAL: NAD, well-groomed, well-developed  NERVOUS SYSTEM:  Alert & Oriented X3, Good concentration;   CHEST/LUNG: Clear to percussion bilaterally; No rales, rhonchi, wheezing, or rubs  HEART: Regular rate and rhythm; No murmurs, rubs, or gallops  ABDOMEN: Soft, Nontender, Nondistended; Bowel sounds present  EXTREMITIES:  2+ Peripheral Pulses, No clubbing, cyanosis,+ left foot tenderness, + left foot edema      LABS:                          9.7    10.03 )-----------( 352      ( 12 Apr 2019 07:57 )             32.0     04-12    136  |  98  |  43<H>  ----------------------------<  116<H>  4.6   |  30  |  8.97<H>    Ca    8.6      12 Apr 2019 07:57            RADIOLOGY:    Drug Screen:        RECOMMENDATIONS    [ ]  [place]  Reviewed and Copied to Chart

## 2019-04-12 NOTE — PROGRESS NOTE ADULT - PROBLEM SELECTOR PLAN 2
c/w home dose of Lantus and humalog   hss ACHS

## 2019-04-12 NOTE — CONSULT NOTE ADULT - REASON FOR ADMISSION
left foot swelling, redness

## 2019-04-12 NOTE — PROGRESS NOTE ADULT - ASSESSMENT
38 Male with PMH of Type 2 DM, Migraine, ESRD on HD for 3 yrs (left arm graft, TTS), Morbid obesity, HTN, Chr dyspnea (uses home O2 prn) presented to hospital for left foot swelling and redness.   In Ed, BP: 148/92mm hg, Hr: 104, Temp: 98.4 F  cbc shows no white count, hb stable from before.   Bmp shows elevated bun/creatinine     Will admit to medicine for Cellulitis.
chart reviewed  seen and examined  d/w er  pt is morbid obese, DM, rsrd on HD was brought in swelling and redness of Lt foot startd from yesterday  wore sneaker ( was tight) no fever  went for HD sent to er    ros no cp or sob or palp, bm ok , no rectal bleed, no UGI symptoms. no headache, no dizzy.  no cough fever, no significant MS pain, not depressed,  vision hearing ok  afebrile  other VS stable physical done unremarkable except lt foot warm swollen  tender  dp 2 plus   wbc nml  had HD today  a/p cellulitis of foot in dm   on vanco,  podiatry,  xray rt foot  foot elevation   dm cont same
foot pain better  not in any  distress no other complaints   vss atble afebrile  physical unchanged   labs noted  wbc nml  no fever   lt foot swelling and tenderness better   ID to f.u    i spoke to podiatry as per podiatry nothing more  they can do for pt and can be d/cd to home.  i requested to podiatry resident to look at foot ( mRI no Om superf cellulitis) and give him a card to f/u wit pod in 1 week and may need different shoe.  pod resident will see pt and d/w his attending.  d/w pt if pain becomes worse , high fever, increasing swelling go to er. no tight shoes,  diet and exercise    esrd on hd
kenny nd examined vs stable physical unchanged   afebrile  physical unchanged    lt foot swelling rednes better   wbc 8.27  hgb 9.9   a/p cont vanco   i d,  podiatry, foot elevation

## 2019-04-12 NOTE — DISCHARGE NOTE PROVIDER - NSDCCPCAREPLAN_GEN_ALL_CORE_FT
PRINCIPAL DISCHARGE DIAGNOSIS  Diagnosis: Cellulitis  Assessment and Plan of Treatment: You came in with swelling. You weer started on antibiotics for cellulitis. YOu were seen by podiatry and also underwent xray and MRI to make sure that infection is not extending to bone. YOu were give Vancomycin and need 1 more dose of vancomycin with dialysis on saturday. Please follow up with your pcp in 1 week of discharge      SECONDARY DISCHARGE DIAGNOSES  Diagnosis: HTN (hypertension)  Assessment and Plan of Treatment: Please contionue wit your home medications as needed. Continue to moniotr your blood pressure    Diagnosis: ESRD (end stage renal disease)  Assessment and Plan of Treatment: Please continue with dialysis sessions regularly    Diagnosis: DM (diabetes mellitus)  Assessment and Plan of Treatment: Conitnue with your home medications    Diagnosis: Schizophrenia  Assessment and Plan of Treatment: Continue with your home medications

## 2019-04-12 NOTE — PROGRESS NOTE ADULT - SUBJECTIVE AND OBJECTIVE BOX
PGY 1 Note discussed with supervising resident and primary attending    Patient is a 38y old  Male who presents with a chief complaint of left foot swelling, redness (12 Apr 2019 15:14)      INTERVAL HPI/OVERNIGHT EVENTS: offers no new complaints; current symptoms resolving    MEDICATIONS  (STANDING):  clopidogrel Tablet 75 milliGRAM(s) Oral daily  epoetin cyndie Injectable 4000 Unit(s) IV Push <User Schedule>  heparin  Injectable 5000 Unit(s) SubCutaneous every 12 hours  insulin glargine Injectable (LANTUS) 10 Unit(s) SubCutaneous at bedtime  insulin lispro (HumaLOG) corrective regimen sliding scale   SubCutaneous Before meals and at bedtime  insulin lispro Injectable (HumaLOG) 5 Unit(s) SubCutaneous three times a day before meals  lidocaine   Patch 1 Patch Transdermal once  melatonin 3 milliGRAM(s) Oral at bedtime  methocarbamol 500 milliGRAM(s) Oral two times a day  oxyCODONE    5 mG/acetaminophen 325 mG 1 Tablet(s) Oral once  pantoprazole    Tablet 40 milliGRAM(s) Oral before breakfast  QUEtiapine 200 milliGRAM(s) Oral daily  sevelamer carbonate 800 milliGRAM(s) Oral three times a day with meals  simvastatin 40 milliGRAM(s) Oral at bedtime  vancomycin  IVPB 1250 milliGRAM(s) IV Intermittent <User Schedule>    MEDICATIONS  (PRN):      __________________________________________________  REVIEW OF SYSTEMS:    CONSTITUTIONAL: No fever,   EYES: no acute visual disturbances  NECK: No pain or stiffness  RESPIRATORY: No cough; No shortness of breath  CARDIOVASCULAR: No chest pain, no palpitations  GASTROINTESTINAL: No pain. No nausea or vomiting; No diarrhea   NEUROLOGICAL: No headache or numbness, no tremors  MUSCULOSKELETAL: No joint pain, no muscle pain  GENITOURINARY: no dysuria, no frequency, no hesitancy  PSYCHIATRY: no depression , no anxiety  ALL OTHER  ROS negative        Vital Signs Last 24 Hrs  T(C): 36.6 (12 Apr 2019 14:35), Max: 37.4 (11 Apr 2019 22:43)  T(F): 97.8 (12 Apr 2019 14:35), Max: 99.3 (11 Apr 2019 22:43)  HR: 96 (12 Apr 2019 14:35) (92 - 107)  BP: 138/75 (12 Apr 2019 14:35) (137/72 - 170/79)  BP(mean): --  RR: 16 (12 Apr 2019 14:35) (16 - 19)  SpO2: 96% (12 Apr 2019 14:35) (95% - 100%)    ________________________________________________  PHYSICAL EXAM:  GENERAL: NAD  HEENT: Normocephalic;  conjunctivae and sclerae clear; moist mucous membranes;   NECK : supple  CHEST/LUNG: Clear to auscultation bilaterally with good air entry   HEART: S1 S2  regular; no murmurs, gallops or rubs  ABDOMEN: Soft, Nontender, Nondistended; Bowel sounds present  EXTREMITIES: no cyanosis; no edema; no calf tenderness  SKIN: warm and dry; no rash  NERVOUS SYSTEM:  Awake and alert; Oriented  to place, person and time ; no new deficits    _________________________________________________  LABS:                        9.7    10.03 )-----------( 352      ( 12 Apr 2019 07:57 )             32.0     04-12    136  |  98  |  43<H>  ----------------------------<  116<H>  4.6   |  30  |  8.97<H>    Ca    8.6      12 Apr 2019 07:57          CAPILLARY BLOOD GLUCOSE      POCT Blood Glucose.: 152 mg/dL (12 Apr 2019 11:31)  POCT Blood Glucose.: 114 mg/dL (12 Apr 2019 07:48)  POCT Blood Glucose.: 129 mg/dL (11 Apr 2019 22:58)  POCT Blood Glucose.: 89 mg/dL (11 Apr 2019 17:00)        RADIOLOGY & ADDITIONAL TESTS:    Imaging Personally Reviewed:  YES/NO    Consultant(s) Notes Reviewed:   YES/ No    Care Discussed with Consultants :     Plan of care was discussed with patient and /or primary care giver; all questions and concerns were addressed and care was aligned with patient's wishes.
HPI:  38 Male with PMH of Type 2 DM, Migraine, ESRD on HD for 3 yrs (left arm graft, TTS), Morbid obesity, HTN, Chr dyspnea (uses home O2 prn) presented to hospital for left foot swelling and redness. Patient was in his usual state of health until today, started having this acute onset swelling, redness and 10/10 sharp pain on left foot. Difficulty walking due to pain. Went to HD, completed session and was told by nephrologist to come to ER.   Denies any fever, chills, nausea, vomiting or any systemic symptoms.   Off note, also had a thigh infection 2 weeks ago, which has now resolved and completed abx course.     In Ed, BP: 148/92mm hg, Hr: 104, Temp: 98.4 F  cbc shows no white count, hb stable from before.   Bmp shows elevated bun/creatinine (09 Apr 2019 20:04)      Patient is a 38y old  Male who presents with a chief complaint of left foot swelling, redness (09 Apr 2019 20:04)      INTERVAL HPI/OVERNIGHT EVENTS:  T(C): 36.9 (04-09-19 @ 14:24), Max: 36.9 (04-09-19 @ 14:24)  HR: 99 (04-09-19 @ 19:55) (99 - 104)  BP: 157/90 (04-09-19 @ 19:55) (148/92 - 157/90)  RR: 17 (04-09-19 @ 19:55) (17 - 18)  SpO2: 96% (04-09-19 @ 19:55) (96% - 98%)  Wt(kg): --  I&O's Summary      REVIEW OF SYSTEMS: denies fever, chills, SOB, palpitations, chest pain, abdominal pain, nausea, vomitting, diarrhea, constipation, dizziness    MEDICATIONS  (STANDING):  clopidogrel Tablet 75 milliGRAM(s) Oral daily  gabapentin 400 milliGRAM(s) Oral daily  insulin glargine Injectable (LANTUS) 20 Unit(s) SubCutaneous at bedtime  insulin lispro (HumaLOG) corrective regimen sliding scale   SubCutaneous Before meals and at bedtime  insulin lispro Injectable (HumaLOG) 10 Unit(s) SubCutaneous three times a day before meals  pantoprazole    Tablet 40 milliGRAM(s) Oral before breakfast  QUEtiapine 200 milliGRAM(s) Oral daily  sevelamer carbonate 800 milliGRAM(s) Oral three times a day with meals  simvastatin 40 milliGRAM(s) Oral at bedtime  vancomycin  IVPB 1000 milliGRAM(s) IV Intermittent <User Schedule>    MEDICATIONS  (PRN):  oxyCODONE    5 mG/acetaminophen 325 mG 2 Tablet(s) Oral every 4 hours PRN Moderate Pain (4 - 6)      PHYSICAL EXAM:  GENERAL: NAD, well-groomed, well-developed  HEAD:  Atraumatic, Normocephalic  EYES: EOMI, PERRLA, conjunctiva and sclera clear  ENMT: No tonsillar erythema, exudates, or enlargement; Moist mucous membranes, Good dentition, No lesions  NECK: Supple, No JVD, Normal thyroid  NERVOUS SYSTEM:  Alert & Oriented X3, Good concentration; Motor Strength 5/5 B/L upper and lower extremities; DTRs 2+ intact and symmetric  CHEST/LUNG: Clear to percussion bilaterally; No rales, rhonchi, wheezing, or rubs  HEART: Regular rate and rhythm; No murmurs, rubs, or gallops  ABDOMEN: Soft, Nontender, Nondistended; Bowel sounds present  EXTREMITIES:  2+ Peripheral Pulses, No clubbing, cyanosis, or edema  LYMPH: No lymphadenopathy noted  SKIN: No rashes or lesions  LABS:                        9.9    9.64  )-----------( 431      ( 09 Apr 2019 17:36 )             32.6     04-09    141  |  99  |  31<H>  ----------------------------<  130<H>  4.3   |  36<H>  |  6.98<H>    Ca    8.1<L>      09 Apr 2019 17:36  Mg     2.0     04-09    TPro  7.4  /  Alb  2.8<L>  /  TBili  0.2  /  DBili  x   /  AST  25  /  ALT  28  /  AlkPhos  96  04-09    PT/INR - ( 09 Apr 2019 17:36 )   PT: 11.8 sec;   INR: 1.06 ratio         PTT - ( 09 Apr 2019 17:36 )  PTT:34.4 sec    CAPILLARY BLOOD GLUCOSE
HPI:  38 Male with PMH of Type 2 DM, Migraine, ESRD on HD for 3 yrs (left arm graft, TTS), Morbid obesity, HTN, Chr dyspnea (uses home O2 prn) presented to hospital for left foot swelling and redness. Patient was in his usual state of health until today, started having this acute onset swelling, redness and 10/10 sharp pain on left foot. Difficulty walking due to pain. Went to HD, completed session and was told by nephrologist to come to ER.   Denies any fever, chills, nausea, vomiting or any systemic symptoms.   Off note, also had a thigh infection 2 weeks ago, which has now resolved and completed abx course.     In Ed, BP: 148/92mm hg, Hr: 104, Temp: 98.4 F  cbc shows no white count, hb stable from before.   Bmp shows elevated bun/creatinine (09 Apr 2019 20:04)      Patient is a 38y old  Male who presents with a chief complaint of left foot swelling, redness (10 Apr 2019 10:57)      INTERVAL HPI/OVERNIGHT EVENTS:  T(C): 36.6 (04-10-19 @ 14:31), Max: 36.8 (04-10-19 @ 00:55)  HR: 84 (04-10-19 @ 14:31) (84 - 99)  BP: 146/91 (04-10-19 @ 14:31) (142/87 - 161/97)  RR: 18 (04-10-19 @ 14:31) (17 - 18)  SpO2: 97% (04-10-19 @ 14:31) (96% - 97%)  Wt(kg): --  I&O's Summary    09 Apr 2019 07:01  -  10 Apr 2019 07:00  --------------------------------------------------------  IN: 0 mL / OUT: 500 mL / NET: -500 mL        REVIEW OF SYSTEMS: denies fever, chills, SOB, palpitations, chest pain, abdominal pain, nausea, vomitting, diarrhea, constipation, dizziness    MEDICATIONS  (STANDING):  clopidogrel Tablet 75 milliGRAM(s) Oral daily  gabapentin 400 milliGRAM(s) Oral daily  heparin  Injectable 5000 Unit(s) SubCutaneous every 12 hours  insulin glargine Injectable (LANTUS) 20 Unit(s) SubCutaneous at bedtime  insulin lispro (HumaLOG) corrective regimen sliding scale   SubCutaneous Before meals and at bedtime  insulin lispro Injectable (HumaLOG) 5 Unit(s) SubCutaneous three times a day before meals  lidocaine   Patch 1 Patch Transdermal once  melatonin 3 milliGRAM(s) Oral at bedtime  pantoprazole    Tablet 40 milliGRAM(s) Oral before breakfast  QUEtiapine 200 milliGRAM(s) Oral daily  sevelamer carbonate 800 milliGRAM(s) Oral three times a day with meals  simvastatin 40 milliGRAM(s) Oral at bedtime  vancomycin  IVPB 1250 milliGRAM(s) IV Intermittent <User Schedule>    MEDICATIONS  (PRN):  acetaminophen  IVPB .. 1000 milliGRAM(s) IV Intermittent every 8 hours PRN Moderate Pain (4 - 6)      PHYSICAL EXAM:  GENERAL: NAD, well-groomed, well-developed  HEAD:  Atraumatic, Normocephalic  EYES: EOMI, PERRLA, conjunctiva and sclera clear  ENMT: No tonsillar erythema, exudates, or enlargement; Moist mucous membranes, Good dentition, No lesions  NECK: Supple, No JVD, Normal thyroid  NERVOUS SYSTEM:  Alert & Oriented X3, Good concentration; Motor Strength 5/5 B/L upper and lower extremities; DTRs 2+ intact and symmetric  CHEST/LUNG: Clear to percussion bilaterally; No rales, rhonchi, wheezing, or rubs  HEART: Regular rate and rhythm; No murmurs, rubs, or gallops  ABDOMEN: Soft, Nontender, Nondistended; Bowel sounds present  EXTREMITIES:  2+ Peripheral Pulses, No clubbing, cyanosis, or edema  LYMPH: No lymphadenopathy noted  SKIN: No rashes or lesions  LABS:                        9.9    8.27  )-----------( 380      ( 10 Apr 2019 07:40 )             33.8     04-10    135  |  97  |  39<H>  ----------------------------<  102<H>  4.4   |  29  |  7.87<H>    Ca    8.4      10 Apr 2019 07:40  Phos  7.7     04-10  Mg     2.3     04-10    TPro  7.1  /  Alb  2.8<L>  /  TBili  0.2  /  DBili  x   /  AST  24  /  ALT  25  /  AlkPhos  85  04-10    PT/INR - ( 09 Apr 2019 17:36 )   PT: 11.8 sec;   INR: 1.06 ratio         PTT - ( 09 Apr 2019 17:36 )  PTT:34.4 sec    CAPILLARY BLOOD GLUCOSE      POCT Blood Glucose.: 81 mg/dL (10 Apr 2019 12:08)  POCT Blood Glucose.: 108 mg/dL (10 Apr 2019 08:07)  POCT Blood Glucose.: 162 mg/dL (09 Apr 2019 23:08)
HPI:  38 Male with PMH of Type 2 DM, Migraine, ESRD on HD for 3 yrs (left arm graft, TTS), Morbid obesity, HTN, Chr dyspnea (uses home O2 prn) presented to hospital for left foot swelling and redness. Patient was in his usual state of health until today, started having this acute onset swelling, redness and 10/10 sharp pain on left foot. Difficulty walking due to pain. Went to HD, completed session and was told by nephrologist to come to ER.   Denies any fever, chills, nausea, vomiting or any systemic symptoms.   Off note, also had a thigh infection 2 weeks ago, which has now resolved and completed abx course.     In Ed, BP: 148/92mm hg, Hr: 104, Temp: 98.4 F  cbc shows no white count, hb stable from before.   Bmp shows elevated bun/creatinine (09 Apr 2019 20:04)      Patient is a 38y old  Male who presents with a chief complaint of left foot swelling, redness (12 Apr 2019 11:36)      INTERVAL HPI/OVERNIGHT EVENTS:  T(C): 36.6 (04-12-19 @ 14:35), Max: 37.4 (04-11-19 @ 22:43)  HR: 96 (04-12-19 @ 14:35) (92 - 107)  BP: 138/75 (04-12-19 @ 14:35) (137/72 - 170/79)  RR: 16 (04-12-19 @ 14:35) (16 - 19)  SpO2: 96% (04-12-19 @ 14:35) (95% - 100%)  Wt(kg): --  I&O's Summary    11 Apr 2019 07:01  -  12 Apr 2019 07:00  --------------------------------------------------------  IN: 0 mL / OUT: 850 mL / NET: -850 mL        REVIEW OF SYSTEMS: denies fever, chills, SOB, palpitations, chest pain, abdominal pain, nausea, vomitting, diarrhea, constipation, dizziness    MEDICATIONS  (STANDING):  clopidogrel Tablet 75 milliGRAM(s) Oral daily  epoetin cyndie Injectable 4000 Unit(s) IV Push <User Schedule>  heparin  Injectable 5000 Unit(s) SubCutaneous every 12 hours  insulin glargine Injectable (LANTUS) 10 Unit(s) SubCutaneous at bedtime  insulin lispro (HumaLOG) corrective regimen sliding scale   SubCutaneous Before meals and at bedtime  insulin lispro Injectable (HumaLOG) 5 Unit(s) SubCutaneous three times a day before meals  lidocaine   Patch 1 Patch Transdermal once  melatonin 3 milliGRAM(s) Oral at bedtime  oxyCODONE    5 mG/acetaminophen 325 mG 1 Tablet(s) Oral once  pantoprazole    Tablet 40 milliGRAM(s) Oral before breakfast  QUEtiapine 200 milliGRAM(s) Oral daily  sevelamer carbonate 800 milliGRAM(s) Oral three times a day with meals  simvastatin 40 milliGRAM(s) Oral at bedtime  vancomycin  IVPB 1250 milliGRAM(s) IV Intermittent <User Schedule>    MEDICATIONS  (PRN):      PHYSICAL EXAM:  GENERAL: NAD, well-groomed, well-developed  HEAD:  Atraumatic, Normocephalic  EYES: EOMI, PERRLA, conjunctiva and sclera clear  ENMT: No tonsillar erythema, exudates, or enlargement; Moist mucous membranes, Good dentition, No lesions  NECK: Supple, No JVD, Normal thyroid  NERVOUS SYSTEM:  Alert & Oriented X3, Good concentration; Motor Strength 5/5 B/L upper and lower extremities; DTRs 2+ intact and symmetric  CHEST/LUNG: Clear to percussion bilaterally; No rales, rhonchi, wheezing, or rubs  HEART: Regular rate and rhythm; No murmurs, rubs, or gallops  ABDOMEN: Soft, Nontender, Nondistended; Bowel sounds present  EXTREMITIES:  2+ Peripheral Pulses, No clubbing, cyanosis, or edema  LYMPH: No lymphadenopathy noted  SKIN: No rashes or lesions  LABS:                        9.7    10.03 )-----------( 352      ( 12 Apr 2019 07:57 )             32.0     04-12    136  |  98  |  43<H>  ----------------------------<  116<H>  4.6   |  30  |  8.97<H>    Ca    8.6      12 Apr 2019 07:57          CAPILLARY BLOOD GLUCOSE      POCT Blood Glucose.: 152 mg/dL (12 Apr 2019 11:31)  POCT Blood Glucose.: 114 mg/dL (12 Apr 2019 07:48)  POCT Blood Glucose.: 129 mg/dL (11 Apr 2019 22:58)  POCT Blood Glucose.: 89 mg/dL (11 Apr 2019 17:00)
PGY 1 Note discussed with supervising resident and primary attending    Patient is a 38y old  Male who presents with a chief complaint of left foot swelling, redness (09 Apr 2019 21:11)      INTERVAL HPI/OVERNIGHT EVENTS: no new complaints    MEDICATIONS  (STANDING):  clopidogrel Tablet 75 milliGRAM(s) Oral daily  gabapentin 400 milliGRAM(s) Oral daily  heparin  Injectable 5000 Unit(s) SubCutaneous every 12 hours  insulin glargine Injectable (LANTUS) 20 Unit(s) SubCutaneous at bedtime  insulin lispro (HumaLOG) corrective regimen sliding scale   SubCutaneous Before meals and at bedtime  insulin lispro Injectable (HumaLOG) 10 Unit(s) SubCutaneous three times a day before meals  lidocaine   Patch 1 Patch Transdermal once  melatonin 3 milliGRAM(s) Oral at bedtime  pantoprazole    Tablet 40 milliGRAM(s) Oral before breakfast  QUEtiapine 200 milliGRAM(s) Oral daily  sevelamer carbonate 800 milliGRAM(s) Oral three times a day with meals  simvastatin 40 milliGRAM(s) Oral at bedtime  vancomycin  IVPB 1000 milliGRAM(s) IV Intermittent <User Schedule>    MEDICATIONS  (PRN):  morphine  - Injectable 2 milliGRAM(s) IV Push every 6 hours PRN Severe Pain (7 - 10)  oxyCODONE    5 mG/acetaminophen 325 mG 2 Tablet(s) Oral every 4 hours PRN Moderate Pain (4 - 6)      __________________________________________________  REVIEW OF SYSTEMS:    CONSTITUTIONAL: No fever,   EYES: no acute visual disturbances  NECK: No pain or stiffness  RESPIRATORY: No cough; No shortness of breath  CARDIOVASCULAR: No chest pain, no palpitations  GASTROINTESTINAL: No pain. No nausea or vomiting; No diarrhea   NEUROLOGICAL: No headache or numbness, no tremors  MUSCULOSKELETAL: No joint pain, no muscle pain  GENITOURINARY: no dysuria, no frequency, no hesitancy  PSYCHIATRY: no depression , no anxiety  ALL OTHER  ROS negative        Vital Signs Last 24 Hrs  T(C): 36.3 (10 Apr 2019 05:45), Max: 36.9 (09 Apr 2019 14:24)  T(F): 97.3 (10 Apr 2019 05:45), Max: 98.4 (09 Apr 2019 14:24)  HR: 84 (10 Apr 2019 05:45) (84 - 104)  BP: 145/95 (10 Apr 2019 05:45) (142/87 - 161/97)  BP(mean): --  RR: 18 (10 Apr 2019 05:45) (17 - 18)  SpO2: 97% (10 Apr 2019 05:45) (96% - 98%)    ________________________________________________  PHYSICAL EXAM:  GENERAL: NAD  HEENT: Normocephalic;  conjunctivae and sclerae clear; moist mucous membranes;   NECK : supple  CHEST/LUNG: Clear to auscultation bilaterally with good air entry   HEART: S1 S2  regular; no murmurs, gallops or rubs  ABDOMEN: Soft, Nontender, Nondistended; Bowel sounds present  EXTREMITIES: cellulitis of left foot  SKIN: warm and dry; no rash  NERVOUS SYSTEM:  Awake and alert    _________________________________________________  LABS:                        9.9    8.27  )-----------( 380      ( 10 Apr 2019 07:40 )             33.8     04-10    135  |  97  |  39<H>  ----------------------------<  102<H>  4.4   |  29  |  7.87<H>    Ca    8.4      10 Apr 2019 07:40  Phos  7.7     04-10  Mg     2.3     04-10    TPro  7.1  /  Alb  2.8<L>  /  TBili  0.2  /  DBili  x   /  AST  24  /  ALT  25  /  AlkPhos  85  04-10    PT/INR - ( 09 Apr 2019 17:36 )   PT: 11.8 sec;   INR: 1.06 ratio         PTT - ( 09 Apr 2019 17:36 )  PTT:34.4 sec    CAPILLARY BLOOD GLUCOSE      POCT Blood Glucose.: 108 mg/dL (10 Apr 2019 08:07)  POCT Blood Glucose.: 162 mg/dL (09 Apr 2019 23:08)        RADIOLOGY & ADDITIONAL TESTS:    Imaging Personally Reviewed:  YES/NO    Consultant(s) Notes Reviewed:   YES/ No    Care Discussed with Consultants :     Plan of care was discussed with patient and /or primary care giver; all questions and concerns were addressed and care was aligned with patient's wishes.
Patient is a 38y old  Male who presents with a chief complaint of left leg swelling , redness       HPI:  Patient is a 39 y/o M from Home with History of ESRD on HD TTS via left arm AV fistula/graft x 2 years, HTN, HLD, DM, chronic back pain presented with complaint of left foot and leg swelling and redness. . Patient also complaints of chills , nausea , NBNP vomiting. No fever, no chest pain , no sob , no dizziness , no headache.     Pt seen bedside. Pt states his left foot and leg has been red and swollen for a few days. Pt states the pain has gotten better over the last few days     PMH: ESRD on dialysis  Morbid obesity with BMI of 40.0-44.9, adult  Diabetes    Allergies: aspirin (Swelling)  penicillins (Swelling)  shellfish (Unknown)    Medications: acetaminophen   Tablet .. 650 milliGRAM(s) Oral every 6 hours PRN  cinacalcet 30 milliGRAM(s) Oral daily  clindamycin IVPB 600 milliGRAM(s) IV Intermittent every 8 hours  escitalopram 10 milliGRAM(s) Oral daily  gabapentin 400 milliGRAM(s) Oral two times a day  HYDROmorphone   Tablet 2 milliGRAM(s) Oral every 6 hours PRN  influenza   Vaccine 0.5 milliLiter(s) IntraMuscular once  insulin glargine Injectable (LANTUS) 15 Unit(s) SubCutaneous at bedtime  insulin lispro (HumaLOG) corrective regimen sliding scale   SubCutaneous Before meals and at bedtime  insulin lispro Injectable (HumaLOG) 5 Unit(s) SubCutaneous three times a day before meals  mirtazapine 45 milliGRAM(s) Oral daily  pantoprazole    Tablet 40 milliGRAM(s) Oral before breakfast  QUEtiapine 200 milliGRAM(s) Oral daily  sevelamer hydrochloride 1600 milliGRAM(s) Oral every 8 hours  simvastatin 40 milliGRAM(s) Oral at bedtime    FH:Family history of diabetes mellitus (Mother)    PSX: H/O hernia repair    SH: acetaminophen   Tablet .. 650 milliGRAM(s) Oral every 6 hours PRN  cinacalcet 30 milliGRAM(s) Oral daily  clindamycin IVPB 600 milliGRAM(s) IV Intermittent every 8 hours  escitalopram 10 milliGRAM(s) Oral daily  gabapentin 400 milliGRAM(s) Oral two times a day  HYDROmorphone   Tablet 2 milliGRAM(s) Oral every 6 hours PRN  influenza   Vaccine 0.5 milliLiter(s) IntraMuscular once  insulin glargine Injectable (LANTUS) 15 Unit(s) SubCutaneous at bedtime  insulin lispro (HumaLOG) corrective regimen sliding scale   SubCutaneous Before meals and at bedtime  insulin lispro Injectable (HumaLOG) 5 Unit(s) SubCutaneous three times a day before meals  mirtazapine 45 milliGRAM(s) Oral daily  pantoprazole    Tablet 40 milliGRAM(s) Oral before breakfast  QUEtiapine 200 milliGRAM(s) Oral daily  sevelamer hydrochloride 1600 milliGRAM(s) Oral every 8 hours  simvastatin 40 milliGRAM(s) Oral at bedtime      Vital Signs Last 24 Hrs  ICU Vital Signs Last 24 Hrs  T(C): 36.6 (12 Apr 2019 14:35), Max: 37.4 (11 Apr 2019 22:43)  T(F): 97.8 (12 Apr 2019 14:35), Max: 99.3 (11 Apr 2019 22:43)  HR: 96 (12 Apr 2019 14:35) (92 - 107)  BP: 138/75 (12 Apr 2019 14:35) (137/72 - 170/79)  BP(mean): --  ABP: --  ABP(mean): --  RR: 16 (12 Apr 2019 14:35) (16 - 19)  SpO2: 96% (12 Apr 2019 14:35) (95% - 100%)      LABS:                          9.7    10.03 )-----------( 352      ( 12 Apr 2019 07:57 )             32.0     04-12    136  |  98  |  43<H>  ----------------------------<  116<H>  4.6   |  30  |  8.97<H>    Ca    8.6      12 Apr 2019 07:57      PHYSICAL EXAM  GEN: PRESTON JOHNSON JR is a pleasant well-nourished, well developed 38y Male in no acute distress, alert awake, and oriented to person, place and time.   LE Focused:    Vasc:  DP/PT palpable, CFT <3 sec, TG warm to cool, Edema to left foot and leg - resolving   Derm: No IDM, no open lesions, no clinical signs of infection  Neuro: Protective sensation intact      < from: Xray Tibia + Fibula 2 Views, Right (11.29.18 @ 20:13) >    EXAM:  LEG AP&LAT - RIGHT                            PROCEDURE DATE:  11/29/2018          INTERPRETATION:  CLINICAL INDICATION:  Right lower extremity cellulitis;   evaluate for osteomyelitis.    COMPARISON:  None    TECHNIQUE:  AP and lateral radiographs of the right lower leg performed.    FINDINGS:  There is no evidence for acute or chronic fracture deformity   of the right tibia or fibula. No regions of osteolysis or osteosclerosis   noted. Knee and ankle articulations appear intact. Soft tissue   calcifications are evident. No retained metallic foreign bodies are noted.    IMPRESSION:  As above.        LISA KAPLAN   This document has been electronically signed. Nov 29 2018  8:25PM     ----------------------------------------------------------------    < from: Xray Foot AP + Lateral, Left (04.09.19 @ 20:38) >  Di  EXAM:  FOOT 2VIEWS LT                            PROCEDURE DATE:  04/09/2019          INTERPRETATION:  Frontal and lateral views of the left foot dated   4/9/2019.    CLINICAL INFORMATION: Left foot infection.    The study is correlated with a prior exam from 12/17/2015.    FINDINGS:    There is mild soft tissue swelling, particularly over the dorsum of the   forefoot. There is no acute fracture or dislocation. There is no obvious   cortical erosion or periosteal reaction. There is no subcutaneous gas or   radiopaque foreign body.    IMPRESSION:    Soft tissue swelling. No obvious cortical erosion or periosteal reaction.   No subcutaneous gas or radiopaque foreign body.    -------------------------------------------------------------------    < from: MR Foot No Cont, Left (04.11.19 @ 14:11) >    EXAM:  MR FOOT LT                            PROCEDURE DATE:  04/11/2019          INTERPRETATION:  History: Cellulitis. Left foot pain. Concern for   underlying abscess.    Multiplanar multisequence MRI of the left foot was performed from the   level of the toes to the level of the cuneiforms.    Correlation is made with prior radiographs from April 4, 2019.    Findings:    There is confluent subcutaneous edema overlying the dorsum of the foot   which extends distally along the toes. There is associated skin   thickening. Findings are consistent with cellulitis. Evaluation for joint   fluid collection is limited by the lack of intravenous contrast. The   marrow signal is within normal limits without acute fracture or   osteonecrosis. There is no evidence of osteomyelitis.    There is mild first metatarsal phalangeal and moderate hallux sesamoid   joint arthrosis. Hammertoe deformities of the second through fourth toes   is noted. The remaining joint spaces are otherwise preserved.    The visualized tendinous structures are intact without tenosynovitis or   tearing. There is mild degeneration of the Lisfranc ligament without   discrete tear.    There is edema and fatty infiltration of the plantar musculature which   may be related tounderlying denervation related changes.    Impression:    Extensive confluent subcutaneous edema overlying the dorsum of the foot   suggestive of cellulitis. Evaluation for focal fluid collection is   limited by the lack of intravenous contrast. No evidence of osteomyelitis.                DHRUV SINGH M.D., ATTENDING RADIOLOGIST  This document has been electronically signed. Apr 11 2019  4:06PM    < end of copied text >  	      A:  L foot swelling and cellulitis    P:  Patient evaluated, chart reviewed.   X-ray reviewed as above  MRI results as noted above   no dressing needed at this time  Patient stable for discharge per podiatry standpoint - Pt to follow up at [address] [place] [address] on Tuesday or Wednesday with Dr. Farrell   Discussed with patient the nature etiology, and pathology of diabetes and how it correlates with the feet  Pt gave a verbal understanding and all questions were answered to the patient's satisfaction  Reconsult as needed, thank you   Discussed with attending Dr. Farrell
PGY 1 Note discussed with supervising resident and primary attending    Patient is a 38y old  Male who presents with a chief complaint of left foot swelling, redness (11 Apr 2019 12:10)      INTERVAL HPI/OVERNIGHT EVENTS: offers no new complaints; current symptoms resolving    MEDICATIONS  (STANDING):  clopidogrel Tablet 75 milliGRAM(s) Oral daily  epoetin cyndie Injectable 4000 Unit(s) IV Push <User Schedule>  gabapentin 400 milliGRAM(s) Oral daily  heparin  Injectable 5000 Unit(s) SubCutaneous every 12 hours  insulin glargine Injectable (LANTUS) 10 Unit(s) SubCutaneous at bedtime  insulin lispro (HumaLOG) corrective regimen sliding scale   SubCutaneous Before meals and at bedtime  insulin lispro Injectable (HumaLOG) 5 Unit(s) SubCutaneous three times a day before meals  lidocaine   Patch 1 Patch Transdermal once  melatonin 3 milliGRAM(s) Oral at bedtime  pantoprazole    Tablet 40 milliGRAM(s) Oral before breakfast  QUEtiapine 200 milliGRAM(s) Oral daily  sevelamer carbonate 800 milliGRAM(s) Oral three times a day with meals  simvastatin 40 milliGRAM(s) Oral at bedtime  vancomycin  IVPB 1250 milliGRAM(s) IV Intermittent <User Schedule>    MEDICATIONS  (PRN):  acetaminophen  IVPB .. 1000 milliGRAM(s) IV Intermittent every 8 hours PRN Moderate Pain (4 - 6)      __________________________________________________  REVIEW OF SYSTEMS:    CONSTITUTIONAL: No fever,   EYES: no acute visual disturbances  NECK: No pain or stiffness  RESPIRATORY: No cough; No shortness of breath  CARDIOVASCULAR: No chest pain, no palpitations  GASTROINTESTINAL: No pain. No nausea or vomiting; No diarrhea   NEUROLOGICAL: No headache or numbness, no tremors  MUSCULOSKELETAL: No joint pain, no muscle pain  GENITOURINARY: no dysuria, no frequency, no hesitancy  PSYCHIATRY: no depression , no anxiety  ALL OTHER  ROS negative        Vital Signs Last 24 Hrs  T(C): 36.9 (11 Apr 2019 04:47), Max: 36.9 (11 Apr 2019 04:47)  T(F): 98.4 (11 Apr 2019 04:47), Max: 98.4 (11 Apr 2019 04:47)  HR: 107 (11 Apr 2019 04:47) (84 - 107)  BP: 128/64 (11 Apr 2019 04:47) (128/64 - 151/85)  BP(mean): --  RR: 18 (11 Apr 2019 04:47) (18 - 18)  SpO2: 96% (11 Apr 2019 04:47) (95% - 97%)    ________________________________________________  PHYSICAL EXAM:  GENERAL: NAD  HEENT: Normocephalic;  conjunctivae and sclerae clear; moist mucous membranes;   NECK : supple  CHEST/LUNG: Clear to auscultation bilaterally with good air entry   HEART: S1 S2  regular; no murmurs, gallops or rubs  ABDOMEN: Soft, Nontender, Nondistended; Bowel sounds present  EXTREMITIES: no cyanosis; no edema; no calf tenderness  SKIN: warm and dry; no rash  NERVOUS SYSTEM:  Awake and alert; Oriented  to place, person and time ; no new deficits    _________________________________________________  LABS:                        9.9    8.27  )-----------( 380      ( 10 Apr 2019 07:40 )             33.8     04-10    135  |  97  |  39<H>  ----------------------------<  102<H>  4.4   |  29  |  7.87<H>    Ca    8.4      10 Apr 2019 07:40  Phos  7.7     04-10  Mg     2.3     04-10    TPro  7.1  /  Alb  2.8<L>  /  TBili  0.2  /  DBili  x   /  AST  24  /  ALT  25  /  AlkPhos  85  04-10    PT/INR - ( 09 Apr 2019 17:36 )   PT: 11.8 sec;   INR: 1.06 ratio         PTT - ( 09 Apr 2019 17:36 )  PTT:34.4 sec    CAPILLARY BLOOD GLUCOSE      POCT Blood Glucose.: 119 mg/dL (11 Apr 2019 11:47)  POCT Blood Glucose.: 106 mg/dL (11 Apr 2019 08:25)  POCT Blood Glucose.: 75 mg/dL (10 Apr 2019 21:31)  POCT Blood Glucose.: 120 mg/dL (10 Apr 2019 17:08)        RADIOLOGY & ADDITIONAL TESTS:    Imaging Personally Reviewed:  YES/NO    Consultant(s) Notes Reviewed:   YES/ No    Care Discussed with Consultants :     Plan of care was discussed with patient and /or primary care giver; all questions and concerns were addressed and care was aligned with patient's wishes.

## 2019-04-12 NOTE — DISCHARGE NOTE NURSING/CASE MANAGEMENT/SOCIAL WORK - NSDCDPATPORTLINK_GEN_ALL_CORE
You can access the [facility] Patient Portal, offered by [facility], by registering with the following website: [email or web address]

## 2019-04-12 NOTE — PROGRESS NOTE ADULT - PROVIDER SPECIALTY LIST ADULT
Internal Medicine
Podiatry
Internal Medicine
Internal Medicine

## 2019-04-12 NOTE — DISCHARGE NOTE PROVIDER - HOSPITAL COURSE
HPI:38 Male with PMH of Type 2 DM, Migraine, ESRD on HD for 3 yrs (left arm graft, TTS), Morbid obesity, HTN, Chr dyspnea (uses home O2 prn) presented to hospital for left foot swelling and redness. Patient was in his usual state of health until today, started having this acute onset swelling, redness and 10/10 sharp pain on left foot. Difficulty walking due to pain. Went to HD, completed session and was told by nephrologist to come to ER.     Denies any fever, chills, nausea, vomiting or any systemic symptoms. Pt was found to have cellulitis and was started on IV antibiotics. Pt is underwent MRI and xray of foot and was not found to have infection extending to bone. Pt was seen by podiatry and infectioss disease cionsultant and need one more dose on vancomycin with dialysis upon discharge. Pt is stable for discharge with follow up with pcp

## 2019-04-12 NOTE — PROGRESS NOTE ADULT - PROBLEM SELECTOR PLAN 4
Not on any Bp medications at home  Will continue to monitor and add medication accordingly
Not on any Bp medications at home  Will continue to monitor and add medication accordingly
Not on any Bp medications at home  Will continue to monitor and add mesication accordingly

## 2019-04-12 NOTE — PROGRESS NOTE ADULT - REASON FOR ADMISSION
left foot swelling, redness

## 2019-04-12 NOTE — PROGRESS NOTE ADULT - PROBLEM SELECTOR PLAN 1
Left foot cellulitis  Rf: DM  Afebrile, no leucocytosis  On Vancomycin dosed acc to dialysis schedule  ID Dr Amaral  Nephro Dr La  Await BC results  f/u blood cx- negative   Tylenol prn  Leg elevation, ice therapy  Pain management consult awaited  Await MRI to r/o abscess
Left foot cellulitis  Rf: DM  Afebrile, no leucocytosis  On Vancomycin dosed acc to dialysis schedule  ID Dr Amaral  Nephro Dr La  Await BC results  f/u blood cx- negative   Tylenol prn  Leg elevation, ice therapy  Pain management consult awaited  Await MRI to r/o abscess
Left foot cellulitis  Rf: DM  Afebrile, no leucocytosis  On Vancomycin dosed acc to dialysis schedule  ID Dr Amaral  Nephro Dr La  Await BC results  f/u blood cx   Tylenol prn  Leg elevation, ice therapy  Will get pain management consult

## 2019-04-12 NOTE — CONSULT NOTE ADULT - PROBLEM SELECTOR RECOMMENDATION 9
- dc gabapentin - pt feels that it is ineffective   - robaxin 500mg po q 12 hours  - percocet 1 tag po prn  - stool softeners  - oob

## 2019-04-16 ENCOUNTER — INPATIENT (INPATIENT)
Facility: HOSPITAL | Age: 39
LOS: 1 days | Discharge: HOME | DRG: 602 | End: 2019-04-18
Attending: INTERNAL MEDICINE | Admitting: INTERNAL MEDICINE
Payer: MEDICARE

## 2019-04-16 VITALS
TEMPERATURE: 99 F | WEIGHT: 315 LBS | HEIGHT: 78 IN | SYSTOLIC BLOOD PRESSURE: 152 MMHG | RESPIRATION RATE: 16 BRPM | DIASTOLIC BLOOD PRESSURE: 87 MMHG | HEART RATE: 91 BPM | OXYGEN SATURATION: 98 %

## 2019-04-16 DIAGNOSIS — M79.604 PAIN IN RIGHT LEG: ICD-10-CM

## 2019-04-16 DIAGNOSIS — I10 ESSENTIAL (PRIMARY) HYPERTENSION: ICD-10-CM

## 2019-04-16 DIAGNOSIS — Z98.89 OTHER SPECIFIED POSTPROCEDURAL STATES: Chronic | ICD-10-CM

## 2019-04-16 DIAGNOSIS — Z29.9 ENCOUNTER FOR PROPHYLACTIC MEASURES, UNSPECIFIED: ICD-10-CM

## 2019-04-16 DIAGNOSIS — N18.6 END STAGE RENAL DISEASE: ICD-10-CM

## 2019-04-16 DIAGNOSIS — F20.9 SCHIZOPHRENIA, UNSPECIFIED: ICD-10-CM

## 2019-04-16 DIAGNOSIS — Z98.890 OTHER SPECIFIED POSTPROCEDURAL STATES: Chronic | ICD-10-CM

## 2019-04-16 DIAGNOSIS — E11.9 TYPE 2 DIABETES MELLITUS WITHOUT COMPLICATIONS: ICD-10-CM

## 2019-04-16 DIAGNOSIS — E87.70 FLUID OVERLOAD, UNSPECIFIED: ICD-10-CM

## 2019-04-16 LAB
ANION GAP SERPL CALC-SCNC: 9 MMOL/L — SIGNIFICANT CHANGE UP (ref 5–17)
APPEARANCE UR: CLEAR — SIGNIFICANT CHANGE UP
APTT BLD: 35.8 SEC — SIGNIFICANT CHANGE UP (ref 27.5–36.3)
BASOPHILS # BLD AUTO: 0.05 K/UL — SIGNIFICANT CHANGE UP (ref 0–0.2)
BASOPHILS NFR BLD AUTO: 0.5 % — SIGNIFICANT CHANGE UP (ref 0–2)
BILIRUB UR-MCNC: NEGATIVE — SIGNIFICANT CHANGE UP
BUN SERPL-MCNC: 46 MG/DL — HIGH (ref 7–18)
CALCIUM SERPL-MCNC: 8.2 MG/DL — LOW (ref 8.4–10.5)
CHLORIDE SERPL-SCNC: 96 MMOL/L — SIGNIFICANT CHANGE UP (ref 96–108)
CO2 SERPL-SCNC: 34 MMOL/L — HIGH (ref 22–31)
COLOR SPEC: YELLOW — SIGNIFICANT CHANGE UP
CREAT SERPL-MCNC: 8.82 MG/DL — HIGH (ref 0.5–1.3)
DIFF PNL FLD: ABNORMAL
DRUG SCREEN, SERUM: SIGNIFICANT CHANGE UP
EOSINOPHIL # BLD AUTO: 0.25 K/UL — SIGNIFICANT CHANGE UP (ref 0–0.5)
EOSINOPHIL NFR BLD AUTO: 2.6 % — SIGNIFICANT CHANGE UP (ref 0–6)
GLUCOSE BLDC GLUCOMTR-MCNC: 109 MG/DL — HIGH (ref 70–99)
GLUCOSE BLDC GLUCOMTR-MCNC: 89 MG/DL — SIGNIFICANT CHANGE UP (ref 70–99)
GLUCOSE BLDC GLUCOMTR-MCNC: 99 MG/DL — SIGNIFICANT CHANGE UP (ref 70–99)
GLUCOSE SERPL-MCNC: 98 MG/DL — SIGNIFICANT CHANGE UP (ref 70–99)
GLUCOSE UR QL: 250
HCT VFR BLD CALC: 29.9 % — LOW (ref 39–50)
HGB BLD-MCNC: 9.6 G/DL — LOW (ref 13–17)
IMM GRANULOCYTES NFR BLD AUTO: 0.3 % — SIGNIFICANT CHANGE UP (ref 0–1.5)
INR BLD: 1.1 RATIO — SIGNIFICANT CHANGE UP (ref 0.88–1.16)
KETONES UR-MCNC: NEGATIVE — SIGNIFICANT CHANGE UP
LACTATE SERPL-SCNC: 1 MMOL/L — SIGNIFICANT CHANGE UP (ref 0.7–2)
LEUKOCYTE ESTERASE UR-ACNC: ABNORMAL
LYMPHOCYTES # BLD AUTO: 1.09 K/UL — SIGNIFICANT CHANGE UP (ref 1–3.3)
LYMPHOCYTES # BLD AUTO: 11.2 % — LOW (ref 13–44)
MCHC RBC-ENTMCNC: 30.7 PG — SIGNIFICANT CHANGE UP (ref 27–34)
MCHC RBC-ENTMCNC: 32.1 GM/DL — SIGNIFICANT CHANGE UP (ref 32–36)
MCV RBC AUTO: 95.5 FL — SIGNIFICANT CHANGE UP (ref 80–100)
MONOCYTES # BLD AUTO: 0.87 K/UL — SIGNIFICANT CHANGE UP (ref 0–0.9)
MONOCYTES NFR BLD AUTO: 8.9 % — SIGNIFICANT CHANGE UP (ref 2–14)
NEUTROPHILS # BLD AUTO: 7.46 K/UL — HIGH (ref 1.8–7.4)
NEUTROPHILS NFR BLD AUTO: 76.5 % — SIGNIFICANT CHANGE UP (ref 43–77)
NITRITE UR-MCNC: NEGATIVE — SIGNIFICANT CHANGE UP
NRBC # BLD: 0 /100 WBCS — SIGNIFICANT CHANGE UP (ref 0–0)
NT-PROBNP SERPL-SCNC: 6637 PG/ML — HIGH (ref 0–125)
PH UR: 9 — HIGH (ref 5–8)
PLATELET # BLD AUTO: 371 K/UL — SIGNIFICANT CHANGE UP (ref 150–400)
POTASSIUM SERPL-MCNC: 3.8 MMOL/L — SIGNIFICANT CHANGE UP (ref 3.5–5.3)
POTASSIUM SERPL-SCNC: 3.8 MMOL/L — SIGNIFICANT CHANGE UP (ref 3.5–5.3)
PROT UR-MCNC: 500 MG/DL
PROTHROM AB SERPL-ACNC: 12.3 SEC — SIGNIFICANT CHANGE UP (ref 10–12.9)
RBC # BLD: 3.13 M/UL — LOW (ref 4.2–5.8)
RBC # FLD: 14.6 % — HIGH (ref 10.3–14.5)
SODIUM SERPL-SCNC: 139 MMOL/L — SIGNIFICANT CHANGE UP (ref 135–145)
SP GR SPEC: 1.01 — SIGNIFICANT CHANGE UP (ref 1.01–1.02)
UROBILINOGEN FLD QL: NEGATIVE — SIGNIFICANT CHANGE UP
WBC # BLD: 9.75 K/UL — SIGNIFICANT CHANGE UP (ref 3.8–10.5)
WBC # FLD AUTO: 9.75 K/UL — SIGNIFICANT CHANGE UP (ref 3.8–10.5)

## 2019-04-16 PROCEDURE — 99285 EMERGENCY DEPT VISIT HI MDM: CPT | Mod: 25

## 2019-04-16 PROCEDURE — 71045 X-RAY EXAM CHEST 1 VIEW: CPT | Mod: 26

## 2019-04-16 PROCEDURE — 93970 EXTREMITY STUDY: CPT | Mod: 26

## 2019-04-16 RX ORDER — METHOCARBAMOL 500 MG/1
1500 TABLET, FILM COATED ORAL
Qty: 0 | Refills: 0 | Status: DISCONTINUED | OUTPATIENT
Start: 2019-04-16 | End: 2019-04-18

## 2019-04-16 RX ORDER — MORPHINE SULFATE 50 MG/1
4 CAPSULE, EXTENDED RELEASE ORAL ONCE
Qty: 0 | Refills: 0 | Status: DISCONTINUED | OUTPATIENT
Start: 2019-04-16 | End: 2019-04-16

## 2019-04-16 RX ORDER — ERYTHROPOIETIN 10000 [IU]/ML
4000 INJECTION, SOLUTION INTRAVENOUS; SUBCUTANEOUS
Qty: 0 | Refills: 0 | Status: DISCONTINUED | OUTPATIENT
Start: 2019-04-16 | End: 2019-04-18

## 2019-04-16 RX ORDER — KETOROLAC TROMETHAMINE 30 MG/ML
15 SYRINGE (ML) INJECTION ONCE
Qty: 0 | Refills: 0 | Status: DISCONTINUED | OUTPATIENT
Start: 2019-04-16 | End: 2019-04-16

## 2019-04-16 RX ORDER — ACETAMINOPHEN 500 MG
650 TABLET ORAL ONCE
Qty: 0 | Refills: 0 | Status: DISCONTINUED | OUTPATIENT
Start: 2019-04-16 | End: 2019-04-18

## 2019-04-16 RX ORDER — INSULIN GLARGINE 100 [IU]/ML
8 INJECTION, SOLUTION SUBCUTANEOUS AT BEDTIME
Qty: 0 | Refills: 0 | Status: DISCONTINUED | OUTPATIENT
Start: 2019-04-16 | End: 2019-04-18

## 2019-04-16 RX ORDER — QUETIAPINE FUMARATE 200 MG/1
200 TABLET, FILM COATED ORAL DAILY
Qty: 0 | Refills: 0 | Status: DISCONTINUED | OUTPATIENT
Start: 2019-04-16 | End: 2019-04-18

## 2019-04-16 RX ORDER — METHOCARBAMOL 500 MG/1
500 TABLET, FILM COATED ORAL EVERY 12 HOURS
Qty: 0 | Refills: 0 | Status: DISCONTINUED | OUTPATIENT
Start: 2019-04-16 | End: 2019-04-16

## 2019-04-16 RX ORDER — CLOPIDOGREL BISULFATE 75 MG/1
75 TABLET, FILM COATED ORAL DAILY
Qty: 0 | Refills: 0 | Status: DISCONTINUED | OUTPATIENT
Start: 2019-04-16 | End: 2019-04-18

## 2019-04-16 RX ORDER — ONDANSETRON 8 MG/1
4 TABLET, FILM COATED ORAL ONCE
Qty: 0 | Refills: 0 | Status: COMPLETED | OUTPATIENT
Start: 2019-04-16 | End: 2019-04-16

## 2019-04-16 RX ORDER — INSULIN LISPRO 100/ML
VIAL (ML) SUBCUTANEOUS
Qty: 0 | Refills: 0 | Status: DISCONTINUED | OUTPATIENT
Start: 2019-04-16 | End: 2019-04-18

## 2019-04-16 RX ORDER — KETOROLAC TROMETHAMINE 30 MG/ML
15 SYRINGE (ML) INJECTION ONCE
Qty: 0 | Refills: 0 | Status: DISCONTINUED | OUTPATIENT
Start: 2019-04-16 | End: 2019-04-17

## 2019-04-16 RX ORDER — OXYCODONE AND ACETAMINOPHEN 5; 325 MG/1; MG/1
1 TABLET ORAL EVERY 6 HOURS
Qty: 0 | Refills: 0 | Status: DISCONTINUED | OUTPATIENT
Start: 2019-04-16 | End: 2019-04-18

## 2019-04-16 RX ORDER — PANTOPRAZOLE SODIUM 20 MG/1
40 TABLET, DELAYED RELEASE ORAL
Qty: 0 | Refills: 0 | Status: DISCONTINUED | OUTPATIENT
Start: 2019-04-16 | End: 2019-04-18

## 2019-04-16 RX ORDER — GABAPENTIN 400 MG/1
400 CAPSULE ORAL DAILY
Qty: 0 | Refills: 0 | Status: DISCONTINUED | OUTPATIENT
Start: 2019-04-16 | End: 2019-04-18

## 2019-04-16 RX ORDER — SEVELAMER CARBONATE 2400 MG/1
800 POWDER, FOR SUSPENSION ORAL
Qty: 0 | Refills: 0 | Status: DISCONTINUED | OUTPATIENT
Start: 2019-04-16 | End: 2019-04-18

## 2019-04-16 RX ORDER — SIMVASTATIN 20 MG/1
40 TABLET, FILM COATED ORAL AT BEDTIME
Qty: 0 | Refills: 0 | Status: DISCONTINUED | OUTPATIENT
Start: 2019-04-16 | End: 2019-04-18

## 2019-04-16 RX ORDER — SODIUM CHLORIDE 9 MG/ML
3 INJECTION INTRAMUSCULAR; INTRAVENOUS; SUBCUTANEOUS ONCE
Qty: 0 | Refills: 0 | Status: COMPLETED | OUTPATIENT
Start: 2019-04-16 | End: 2019-04-16

## 2019-04-16 RX ORDER — INSULIN GLARGINE 100 [IU]/ML
20 INJECTION, SOLUTION SUBCUTANEOUS AT BEDTIME
Qty: 0 | Refills: 0 | Status: DISCONTINUED | OUTPATIENT
Start: 2019-04-16 | End: 2019-04-16

## 2019-04-16 RX ORDER — INSULIN LISPRO 100/ML
10 VIAL (ML) SUBCUTANEOUS
Qty: 0 | Refills: 0 | Status: DISCONTINUED | OUTPATIENT
Start: 2019-04-16 | End: 2019-04-18

## 2019-04-16 RX ORDER — DIPHENHYDRAMINE HCL 50 MG
25 CAPSULE ORAL ONCE
Qty: 0 | Refills: 0 | Status: COMPLETED | OUTPATIENT
Start: 2019-04-16 | End: 2019-04-17

## 2019-04-16 RX ADMIN — MORPHINE SULFATE 4 MILLIGRAM(S): 50 CAPSULE, EXTENDED RELEASE ORAL at 09:38

## 2019-04-16 RX ADMIN — SIMVASTATIN 40 MILLIGRAM(S): 20 TABLET, FILM COATED ORAL at 22:44

## 2019-04-16 RX ADMIN — OXYCODONE AND ACETAMINOPHEN 1 TABLET(S): 5; 325 TABLET ORAL at 18:14

## 2019-04-16 RX ADMIN — SODIUM CHLORIDE 3 MILLILITER(S): 9 INJECTION INTRAMUSCULAR; INTRAVENOUS; SUBCUTANEOUS at 07:17

## 2019-04-16 RX ADMIN — ONDANSETRON 4 MILLIGRAM(S): 8 TABLET, FILM COATED ORAL at 08:09

## 2019-04-16 RX ADMIN — CLOPIDOGREL BISULFATE 75 MILLIGRAM(S): 75 TABLET, FILM COATED ORAL at 15:23

## 2019-04-16 RX ADMIN — SEVELAMER CARBONATE 800 MILLIGRAM(S): 2400 POWDER, FOR SUSPENSION ORAL at 18:21

## 2019-04-16 RX ADMIN — METHOCARBAMOL 500 MILLIGRAM(S): 500 TABLET, FILM COATED ORAL at 12:12

## 2019-04-16 RX ADMIN — OXYCODONE AND ACETAMINOPHEN 1 TABLET(S): 5; 325 TABLET ORAL at 12:04

## 2019-04-16 RX ADMIN — MORPHINE SULFATE 4 MILLIGRAM(S): 50 CAPSULE, EXTENDED RELEASE ORAL at 08:09

## 2019-04-16 RX ADMIN — OXYCODONE AND ACETAMINOPHEN 1 TABLET(S): 5; 325 TABLET ORAL at 19:24

## 2019-04-16 RX ADMIN — OXYCODONE AND ACETAMINOPHEN 1 TABLET(S): 5; 325 TABLET ORAL at 13:56

## 2019-04-16 NOTE — CONSULT NOTE ADULT - SUBJECTIVE AND OBJECTIVE BOX
[facility] : Progress Note :: [phone], (office [phone]),   Dr La / Dr Hui / Dr Barber / Dr Villalobos / Dr Hang CASTELLANO / Dr Mehta / Dr Sanchez / Dr Alber dumont  _____________________________________________________________________________________________    38 yr old male with PMHX of ESRD. recently discharged from this hospital with cellulitis of the left leg. Presented to HD last night, late. Had HD with UF ~3 kilos and got last dose of vancomycin. Presents to ED with acute onset of bilateral leg sweling and redness and pain to the left leg. Denies any SOB. CXR however showed pulmonary vascular congestion  which is not changed much  from  previous imaging . Renal consulted for ESRD    aspirin (Short breath)  aspirin (Swelling)  penicillin (Short breath)  penicillins (Swelling)  shellfish (Unknown)    Hospital Medications:   MEDICATIONS  (STANDING):  methocarbamol 500 milliGRAM(s) Oral every 12 hours    REVIEW OF SYSTEMS:  CONSTITUTIONAL: No weakness, fevers or chills  EYES/ENT: No visual changes;  No vertigo or throat pain   NECK: No pain or stiffness  RESPIRATORY: No cough, wheezing, hemoptysis; No shortness of breath  CARDIOVASCULAR: No chest pain or palpitations.  GASTROINTESTINAL: No abdominal or epigastric pain. No nausea, vomiting, or hematemesis; No diarrhea or constipation. No melena or hematochezia.  GENITOURINARY: No dysuria, frequency, foamy urine, urinary urgency, incontinence or hematuria  NEUROLOGICAL: No numbness or weakness  SKIN: No itching, burning, rashes, or lesions   VASCULAR: No bilateral lower extremity edema.   All other review of systems is negative unless indicated above.    VITALS:  T(F): 98.3 (19 @ 11:19), Max: 99.1 (19 @ 01:29)  HR: 83 (04-16-19 @ 11:19)  BP: 137/75 (19 @ 11:19)  RR: 17 (19 @ 11:19)  SpO2: 96% (19 @ 11:19)  Wt(kg): --    Height (cm): 200.66 ( @ :29)  Weight (kg): 167.8 ( @ :29)  BMI (kg/m2): 41.7 ( @ :29)  BSA (m2): 2.96 (:29)  PHYSICAL EXAM:  Constitutional: Obese male in no acute distress.  HEENT: anicteric sclera, oropharynx clear,  Neck: No JVD  Respiratory: CTAB, no wheezes, rales or rhonchi  Cardiovascular: S1, S2, RRR  Gastrointestinal: BS+, soft, NT/ND  Extremities: bilateral leg edema++. left foot and leg warm, swollen and tender.  Neurological: A/O x 3, no focal deficits  : No CVA tenderness. No hernandez.   Vascular Access: LUEAVF with thrill and bruit    LABS:      139  |  96  |  46<H>  ----------------------------<  98  3.8   |  34<H>  |  8.82<H>    Ca    8.2<L>      2019 06:19      Creatinine Trend: 8.82 <--, 8.97 <--, 7.87 <--, 6.98 <--                        9.6    9.75  )-----------( 371      ( 2019 06:19 )             29.9     Urine Studies:  Urinalysis Basic - ( 2019 06:28 )    Color: Yellow / Appearance: Clear / S.015 / pH:   Gluc:  / Ketone: Negative  / Bili: Negative / Urobili: Negative   Blood:  / Protein: 500 mg/dL / Nitrite: Negative   Leuk Esterase: Trace / RBC: 2-5 /HPF / WBC 3-5 /HPF   Sq Epi:  / Non Sq Epi: Few /HPF / Bacteria: Few /HPF        RADIOLOGY & ADDITIONAL STUDIES:

## 2019-04-16 NOTE — H&P ADULT - ASSESSMENT
38 Male Multiple admissions to the hospital in the past with PMH of Type 2 DM, Migraine, ESRD on HD for 3 yrs (left arm graft, TTS), Morbid obesity, HTN, Chr dyspnea (uses home O2 prn) presented to hospital for bilateral foot swelling and pain. Patient recently discharged from [facility] on Friday after being treated for cellulitis of left foot with vancomycin, with unremarkable MRI and suggested to be stable by podiatry and infectious disease for discharge. Patient now returns saying that his leg swelling has not improved after HD yesterday. He has bilateral foot pain and has difficulty walking. His left leg is more swollen than right and he is using a cane to walk. Patient Denies any fever, chills, nausea, vomiting or any systemic symptoms.   In Ed his vitals are stable. 137/75 HR 83. CXR shows pulmonary vascular congestion. doppler of lower extremity is negative for any DVT. Labs are unremarkable. Admitted to worsening pedal edema and volume overload.

## 2019-04-16 NOTE — H&P ADULT - NSHPPHYSICALEXAM_GEN_ALL_CORE
Vital Signs Last 24 Hrs  T(C): 36.8 (16 Apr 2019 11:19), Max: 37.3 (16 Apr 2019 01:29)  T(F): 98.3 (16 Apr 2019 11:19), Max: 99.1 (16 Apr 2019 01:29)  HR: 83 (16 Apr 2019 11:19) (83 - 103)  BP: 137/75 (16 Apr 2019 11:19) (131/75 - 155/102)  BP(mean): --  RR: 17 (16 Apr 2019 11:19) (16 - 17)  SpO2: 96% (16 Apr 2019 11:19) (96% - 99%)    ________________________________________________  PHYSICAL EXAM:  GENERAL: NAD, Obese Male   HEENT: Normocephalic;  conjunctivae and sclerae clear; moist mucous membranes;   NECK : supple  CHEST/LUNG: mild bilateral rales   HEART: S1 S2  regular; no murmurs, gallops or rubs  ABDOMEN: Soft, Nontender, Nondistended; Bowel sounds present  EXTREMITIES: +1 pitting edema, LLE mildly warm. no erythema or ulcers noted. Left arm AV fistula +ve thrill   SKIN: warm and dry; no rash  NERVOUS SYSTEM:  Awake and alert; Oriented  to place, person and time ; no new deficits

## 2019-04-16 NOTE — ED ADULT TRIAGE NOTE - CHIEF COMPLAINT QUOTE
" I started to swell on both legs and pain on inner thigh since Sunday. I was admitted and got discharge last week for swelling on my left leg "

## 2019-04-16 NOTE — H&P ADULT - PROBLEM SELECTOR PLAN 1
-patient comes in with bilateral pedal edema, with chest congestion  -elevated pro BNP 6637  -not in any distress ,saturating well on room air   -Will c/w HD on routine schedule   -Avoid excess fluids

## 2019-04-16 NOTE — ED PROVIDER NOTE - CLINICAL SUMMARY MEDICAL DECISION MAKING FREE TEXT BOX
Pt with persistent leg pain and swelling. Case dw Dr. Andino will admit  to his service .MAR endorsed. Pt agrees with admission. I had a detailed discussion with the patient and/or guardian regarding the historical points, exam findings, and any diagnostic results supporting the admit diagnosis.

## 2019-04-16 NOTE — PROGRESS NOTE ADULT - ASSESSMENT
seen and eaxmimned  was recently discharged after being txd for cellulitis of lt foot  now c/o b/l pedal swelling and feel lt leg and thigh warmer than rt u extrem,  no fever , no chills, no nausea , vomiting, apetite ok    no sob or CP.  vsstable afebrile  physical noted    lt foot mildly swollen non tender  mildy warmer than other foot  lt leg and lt thigh seems ok also.  non pitting edema of both lower extremities  wbc nml  venous doppler   a/p cellulitis unlikely  has NP edema  still i will get ID opinion    dm, esrd cont same  morbid obesity  i have expalined pt of consequences of obesity

## 2019-04-16 NOTE — CONSULT NOTE ADULT - ASSESSMENT
# ESRD. HD no acute indication for emergent HD. Will plan for HD in AM with UF as tolerated. Robaxin pre-HD  # anemia of CKD procrit on HD  # LLE cellulitis. completed a course of vanco. Infectious disease specialist input.  #CKDMBD- resume outpatient binders 800 mg 3 tabs TID AC and sensipar 30mg daily.  #HTN controlled can resule metorpolol    many thanks or the consult

## 2019-04-16 NOTE — PROGRESS NOTE ADULT - SUBJECTIVE AND OBJECTIVE BOX
HPI:      Patient is a 38y old  Male who presents with a chief complaint of bilateral leg swelling and pain (2019 12:38)      INTERVAL HPI/OVERNIGHT EVENTS:  T(C): 36.8 (19 @ 11:19), Max: 37.3 (19 @ 01:29)  HR: 83 (19 @ 11:19) (83 - 103)  BP: 137/75 (19 @ 11:19) (131/75 - 155/102)  RR: 17 (19 @ 11:19) (16 - 17)  SpO2: 96% (19 @ 11:19) (96% - 99%)  Wt(kg): --  I&O's Summary      REVIEW OF SYSTEMS: denies fever, chills, SOB, palpitations, chest pain, abdominal pain, nausea, vomitting, diarrhea, constipation, dizziness    MEDICATIONS  (STANDING):  epoetin cyndie Injectable 4000 Unit(s) IV Push <User Schedule>  methocarbamol 500 milliGRAM(s) Oral every 12 hours  methocarbamol 1500 milliGRAM(s) Oral <User Schedule>    MEDICATIONS  (PRN):  oxyCODONE    5 mG/acetaminophen 325 mG 1 Tablet(s) Oral every 6 hours PRN Severe Pain (7 - 10)      PHYSICAL EXAM:  GENERAL: NAD, well-groomed, well-developed  HEAD:  Atraumatic, Normocephalic  EYES: EOMI, PERRLA, conjunctiva and sclera clear  ENMT: No tonsillar erythema, exudates, or enlargement; Moist mucous membranes, Good dentition, No lesions  NECK: Supple, No JVD, Normal thyroid  NERVOUS SYSTEM:  Alert & Oriented X3, Good concentration; Motor Strength 5/5 B/L upper and lower extremities; DTRs 2+ intact and symmetric  CHEST/LUNG: Clear to percussion bilaterally; No rales, rhonchi, wheezing, or rubs  HEART: Regular rate and rhythm; No murmurs, rubs, or gallops  ABDOMEN: Soft, Nontender, Nondistended; Bowel sounds present  EXTREMITIES:  2+ Peripheral Pulses, No clubbing, cyanosis, or edema  LYMPH: No lymphadenopathy noted  SKIN: No rashes or lesions  LABS:                        9.6    9.75  )-----------( 371      ( 2019 06:19 )             29.9     04-16    139  |  96  |  46<H>  ----------------------------<  98  3.8   |  34<H>  |  8.82<H>    Ca    8.2<L>      2019 06:19      PT/INR - ( 2019 06:19 )   PT: 12.3 sec;   INR: 1.10 ratio         PTT - ( 2019 06:19 )  PTT:35.8 sec  Urinalysis Basic - ( 2019 06:28 )    Color: Yellow / Appearance: Clear / S.015 / pH: x  Gluc: x / Ketone: Negative  / Bili: Negative / Urobili: Negative   Blood: x / Protein: 500 mg/dL / Nitrite: Negative   Leuk Esterase: Trace / RBC: 2-5 /HPF / WBC 3-5 /HPF   Sq Epi: x / Non Sq Epi: Few /HPF / Bacteria: Few /HPF      CAPILLARY BLOOD GLUCOSE      POCT Blood Glucose.: 99 mg/dL (2019 12:12)        Urinalysis Basic - ( 2019 06:28 )    Color: Yellow / Appearance: Clear / S.015 / pH: x  Gluc: x / Ketone: Negative  / Bili: Negative / Urobili: Negative   Blood: x / Protein: 500 mg/dL / Nitrite: Negative   Leuk Esterase: Trace / RBC: 2-5 /HPF / WBC 3-5 /HPF   Sq Epi: x / Non Sq Epi: Few /HPF / Bacteria: Few /HPF

## 2019-04-16 NOTE — H&P ADULT - PROBLEM SELECTOR PLAN 2
-pt comes in with bilateral leg pain and edema  -left leg > swollen than right  Doppler negative for DVT  -No signs of infection   -recently completed work up for cellulitis and treatment with vancomycin  -PT consult  -pain management consult  ID Dr Amaral

## 2019-04-16 NOTE — H&P ADULT - HISTORY OF PRESENT ILLNESS
38 Male Multiple admissions to the hospital in the past with PMH of Type 2 DM, Migraine, ESRD on HD for 3 yrs (left arm graft, TTS), Morbid obesity, HTN, Chr dyspnea (uses home O2 prn) presented to hospital for bilateral foot swelling and pain. Patient recently discharged from [facility] on Friday after being treated for cellulitis of left foot with vancomycin, with unremarkable MRI and suggested to be stable by podiatry and infectious disease for discharge. Patient now returns saying that his leg swelling has not improved after HD yesterday. He has bilateral foot pain and has difficulty walking. His left leg is more swollen than right and he is using a cane to walk. Patient Denies any fever, chills, nausea, vomiting or any systemic symptoms.   In Ed his vitals are stable. 137/75 HR 83. CXR shows pulmonary vascular congestion. doppler of lower extremity is negative for any DVT. Labs are unremarkable except Pro BNP is elevated to 6637

## 2019-04-16 NOTE — ED PROVIDER NOTE - OBJECTIVE STATEMENT
Chief complaint of progressive b/l leg swelling and tenderness x 3 days. No chest pain, no shortness of breath . No fever. Pt states completed HD yesterday.

## 2019-04-17 ENCOUNTER — TRANSCRIPTION ENCOUNTER (OUTPATIENT)
Age: 39
End: 2019-04-17

## 2019-04-17 LAB
-  COAGULASE NEGATIVE STAPHYLOCOCCUS: SIGNIFICANT CHANGE UP
GLUCOSE BLDC GLUCOMTR-MCNC: 108 MG/DL — HIGH (ref 70–99)
GLUCOSE BLDC GLUCOMTR-MCNC: 121 MG/DL — HIGH (ref 70–99)
GLUCOSE BLDC GLUCOMTR-MCNC: 127 MG/DL — HIGH (ref 70–99)
GLUCOSE BLDC GLUCOMTR-MCNC: 156 MG/DL — HIGH (ref 70–99)
GLUCOSE BLDC GLUCOMTR-MCNC: 48 MG/DL — LOW (ref 70–99)
GLUCOSE BLDC GLUCOMTR-MCNC: 81 MG/DL — SIGNIFICANT CHANGE UP (ref 70–99)
GRAM STN FLD: SIGNIFICANT CHANGE UP
METHOD TYPE: SIGNIFICANT CHANGE UP

## 2019-04-17 RX ORDER — VANCOMYCIN HCL 1 G
1500 VIAL (EA) INTRAVENOUS
Qty: 0 | Refills: 0 | Status: DISCONTINUED | OUTPATIENT
Start: 2019-04-17 | End: 2019-04-18

## 2019-04-17 RX ORDER — METHOCARBAMOL 500 MG/1
2 TABLET, FILM COATED ORAL
Qty: 10 | Refills: 0 | OUTPATIENT
Start: 2019-04-17 | End: 2019-04-21

## 2019-04-17 RX ORDER — VANCOMYCIN HCL 1 G
1.5 VIAL (EA) INTRAVENOUS
Qty: 0 | Refills: 0 | COMMUNITY
Start: 2019-04-17

## 2019-04-17 RX ORDER — HYDRALAZINE HCL 50 MG
1 TABLET ORAL
Qty: 90 | Refills: 0 | OUTPATIENT
Start: 2019-04-17 | End: 2019-05-16

## 2019-04-17 RX ORDER — FUROSEMIDE 40 MG
80 TABLET ORAL DAILY
Qty: 0 | Refills: 0 | Status: DISCONTINUED | OUTPATIENT
Start: 2019-04-17 | End: 2019-04-18

## 2019-04-17 RX ORDER — ACETAMINOPHEN 500 MG
2 TABLET ORAL
Qty: 0 | Refills: 0 | COMMUNITY
Start: 2019-04-17

## 2019-04-17 RX ORDER — AMLODIPINE BESYLATE 2.5 MG/1
1 TABLET ORAL
Qty: 30 | Refills: 0 | OUTPATIENT
Start: 2019-04-17 | End: 2019-05-16

## 2019-04-17 RX ORDER — IPRATROPIUM/ALBUTEROL SULFATE 18-103MCG
3 AEROSOL WITH ADAPTER (GRAM) INHALATION ONCE
Qty: 0 | Refills: 0 | Status: COMPLETED | OUTPATIENT
Start: 2019-04-17 | End: 2019-04-17

## 2019-04-17 RX ORDER — AMLODIPINE BESYLATE 2.5 MG/1
5 TABLET ORAL ONCE
Qty: 0 | Refills: 0 | Status: COMPLETED | OUTPATIENT
Start: 2019-04-17 | End: 2019-04-17

## 2019-04-17 RX ORDER — HYDRALAZINE HCL 50 MG
10 TABLET ORAL THREE TIMES A DAY
Qty: 0 | Refills: 0 | Status: DISCONTINUED | OUTPATIENT
Start: 2019-04-17 | End: 2019-04-18

## 2019-04-17 RX ADMIN — Medication 10 UNIT(S): at 09:13

## 2019-04-17 RX ADMIN — INSULIN GLARGINE 8 UNIT(S): 100 INJECTION, SOLUTION SUBCUTANEOUS at 00:00

## 2019-04-17 RX ADMIN — AMLODIPINE BESYLATE 5 MILLIGRAM(S): 2.5 TABLET ORAL at 20:33

## 2019-04-17 RX ADMIN — Medication 10 UNIT(S): at 13:12

## 2019-04-17 RX ADMIN — OXYCODONE AND ACETAMINOPHEN 1 TABLET(S): 5; 325 TABLET ORAL at 09:12

## 2019-04-17 RX ADMIN — Medication 10 MILLIGRAM(S): at 21:55

## 2019-04-17 RX ADMIN — Medication 25 MILLIGRAM(S): at 00:02

## 2019-04-17 RX ADMIN — Medication 15 MILLIGRAM(S): at 00:02

## 2019-04-17 RX ADMIN — ERYTHROPOIETIN 4000 UNIT(S): 10000 INJECTION, SOLUTION INTRAVENOUS; SUBCUTANEOUS at 09:12

## 2019-04-17 RX ADMIN — Medication 80 MILLIGRAM(S): at 21:54

## 2019-04-17 RX ADMIN — OXYCODONE AND ACETAMINOPHEN 1 TABLET(S): 5; 325 TABLET ORAL at 20:35

## 2019-04-17 RX ADMIN — CLOPIDOGREL BISULFATE 75 MILLIGRAM(S): 75 TABLET, FILM COATED ORAL at 13:00

## 2019-04-17 RX ADMIN — METHOCARBAMOL 1500 MILLIGRAM(S): 500 TABLET, FILM COATED ORAL at 09:16

## 2019-04-17 RX ADMIN — Medication 15 MILLIGRAM(S): at 00:45

## 2019-04-17 RX ADMIN — SEVELAMER CARBONATE 800 MILLIGRAM(S): 2400 POWDER, FOR SUSPENSION ORAL at 13:00

## 2019-04-17 RX ADMIN — SEVELAMER CARBONATE 800 MILLIGRAM(S): 2400 POWDER, FOR SUSPENSION ORAL at 09:12

## 2019-04-17 RX ADMIN — SIMVASTATIN 40 MILLIGRAM(S): 20 TABLET, FILM COATED ORAL at 21:54

## 2019-04-17 RX ADMIN — OXYCODONE AND ACETAMINOPHEN 1 TABLET(S): 5; 325 TABLET ORAL at 21:35

## 2019-04-17 RX ADMIN — PANTOPRAZOLE SODIUM 40 MILLIGRAM(S): 20 TABLET, DELAYED RELEASE ORAL at 06:58

## 2019-04-17 RX ADMIN — QUETIAPINE FUMARATE 200 MILLIGRAM(S): 200 TABLET, FILM COATED ORAL at 13:00

## 2019-04-17 RX ADMIN — Medication 3 MILLILITER(S): at 01:31

## 2019-04-17 RX ADMIN — INSULIN GLARGINE 8 UNIT(S): 100 INJECTION, SOLUTION SUBCUTANEOUS at 21:55

## 2019-04-17 RX ADMIN — OXYCODONE AND ACETAMINOPHEN 1 TABLET(S): 5; 325 TABLET ORAL at 09:45

## 2019-04-17 NOTE — DISCHARGE NOTE PROVIDER - NSDCCPCAREPLAN_GEN_ALL_CORE_FT
PRINCIPAL DISCHARGE DIAGNOSIS  Diagnosis: Leg pain, bilateral  Assessment and Plan of Treatment: you were admitted for lower extremity swelling, you received Lasix to reduce edema. continue current medications and follow up with PCP within 1 week      SECONDARY DISCHARGE DIAGNOSES  Diagnosis: HTN (hypertension)  Assessment and Plan of Treatment: -continue current medications, your BP was high in the hospital, you were started on Hydralazine.  meds from pharmacy and follow up with PCP within 1 week for BP monitoring   Low salt diet  Activity as tolerated.  Take all medication as prescribed.  Follow up with your medical doctor for routine blood pressure monitoring at your next visit.  Notify your doctor if you have any of the following symptoms:   Dizziness, Lightheadedness, Blurry vision, Headache, Chest pain, Shortness of breath      Diagnosis: Diabetes  Assessment and Plan of Treatment: Diabetes    Diagnosis: Edema  Assessment and Plan of Treatment: PRINCIPAL DISCHARGE DIAGNOSIS  Diagnosis: Lower extremity cellulitis  Assessment and Plan of Treatment: You presented with bilateral leg pain and swelling. You have been diagnosed with cellulitis. You have been treated with antibiotics. Please continue your vancomycin in your next 2 sessions of dialysis. Follow up with your primary doctor within one week.      SECONDARY DISCHARGE DIAGNOSES  Diagnosis: HTN (hypertension)  Assessment and Plan of Treatment: You have a history of hypertension. Your BP has been high in the hospital so you were started on amlodipine and hydralazine. Please  these medications from your pharmacy and take them as prescribed. Follow up with your primary doctor. Monitor your blood pressure closely, and eat a healthy diet low in salt and fat, and exercise as tolerated.

## 2019-04-17 NOTE — DISCHARGE NOTE PROVIDER - PROVIDER TOKENS
FREE:[LAST:[na],FIRST:[na],PHONE:[(   )    -],FAX:[(   )    -],ADDRESS:[follow up with PCP with in 1 week],FOLLOWUP:[1 week]]

## 2019-04-17 NOTE — CONSULT NOTE ADULT - REASON FOR ADMISSION
bilateral pedal edema and pain in lower extremities
bilateral leg swelling and pain
bilateral pedal edema and pain in lower extremities

## 2019-04-17 NOTE — PROGRESS NOTE ADULT - SUBJECTIVE AND OBJECTIVE BOX
HPI:  38 Male Multiple admissions to the hospital in the past with PMH of Type 2 DM, Migraine, ESRD on HD for 3 yrs (left arm graft, TTS), Morbid obesity, HTN, Chr dyspnea (uses home O2 prn) presented to hospital for bilateral foot swelling and pain. Patient recently discharged from [facility] on Friday after being treated for cellulitis of left foot with vancomycin, with unremarkable MRI and suggested to be stable by podiatry and infectious disease for discharge. Patient now returns saying that his leg swelling has not improved after HD yesterday. He has bilateral foot pain and has difficulty walking. His left leg is more swollen than right and he is using a cane to walk. Patient Denies any fever, chills, nausea, vomiting or any systemic symptoms.   In Ed his vitals are stable. 137/75 HR 83. CXR shows pulmonary vascular congestion. doppler of lower extremity is negative for any DVT. Labs are unremarkable except Pro BNP is elevated to 6637 (2019 13:57)      Patient is a 38y old  Male who presents with a chief complaint of bilateral pedal edema and pain in lower extremities (2019 12:50)      INTERVAL HPI/OVERNIGHT EVENTS:  T(C): 36.3 (19 @ 11:22), Max: 36.9 (19 @ 23:41)  HR: 81 (19 @ 11:22) (79 - 88)  BP: 165/95 (19 @ 11:22) (135/79 - 165/98)  RR: 18 (19 @ 11:22) (16 - 18)  SpO2: 95% (19 @ 11:22) (95% - 100%)  Wt(kg): --  I&O's Summary      REVIEW OF SYSTEMS: denies fever, chills, SOB, palpitations, chest pain, abdominal pain, nausea, vomitting, diarrhea, constipation, dizziness    MEDICATIONS  (STANDING):  clopidogrel Tablet 75 milliGRAM(s) Oral daily  epoetin cyndie Injectable 4000 Unit(s) IV Push <User Schedule>  furosemide    Tablet 80 milliGRAM(s) Oral daily  gabapentin 400 milliGRAM(s) Oral daily  hydrALAZINE 10 milliGRAM(s) Oral three times a day  insulin glargine Injectable (LANTUS) 8 Unit(s) SubCutaneous at bedtime  insulin lispro (HumaLOG) corrective regimen sliding scale   SubCutaneous three times a day before meals  insulin lispro Injectable (HumaLOG) 10 Unit(s) SubCutaneous three times a day before meals  methocarbamol 1500 milliGRAM(s) Oral <User Schedule>  pantoprazole    Tablet 40 milliGRAM(s) Oral before breakfast  QUEtiapine 200 milliGRAM(s) Oral daily  sevelamer carbonate 800 milliGRAM(s) Oral three times a day with meals  simvastatin 40 milliGRAM(s) Oral at bedtime  vancomycin  IVPB 1500 milliGRAM(s) IV Intermittent <User Schedule>    MEDICATIONS  (PRN):  acetaminophen   Tablet .. 650 milliGRAM(s) Oral once PRN Mild Pain (1 - 3), Moderate Pain (4 - 6)  oxyCODONE    5 mG/acetaminophen 325 mG 1 Tablet(s) Oral every 6 hours PRN Severe Pain (7 - 10)      PHYSICAL EXAM:  GENERAL: NAD, well-groomed, well-developed  HEAD:  Atraumatic, Normocephalic  EYES: EOMI, PERRLA, conjunctiva and sclera clear  ENMT: No tonsillar erythema, exudates, or enlargement; Moist mucous membranes, Good dentition, No lesions  NECK: Supple, No JVD, Normal thyroid  NERVOUS SYSTEM:  Alert & Oriented X3, Good concentration; Motor Strength 5/5 B/L upper and lower extremities; DTRs 2+ intact and symmetric  CHEST/LUNG: Clear to percussion bilaterally; No rales, rhonchi, wheezing, or rubs  HEART: Regular rate and rhythm; No murmurs, rubs, or gallops  ABDOMEN: Soft, Nontender, Nondistended; Bowel sounds present  EXTREMITIES:  2+ Peripheral Pulses, No clubbing, cyanosis, or edema  LYMPH: No lymphadenopathy noted  SKIN: No rashes or lesions  LABS:                        9.6    9.75  )-----------( 371      ( 2019 06:19 )             29.9     04-16    139  |  96  |  46<H>  ----------------------------<  98  3.8   |  34<H>  |  8.82<H>    Ca    8.2<L>      2019 06:19      PT/INR - ( 2019 06:19 )   PT: 12.3 sec;   INR: 1.10 ratio         PTT - ( 2019 06:19 )  PTT:35.8 sec  Urinalysis Basic - ( 2019 06:28 )    Color: Yellow / Appearance: Clear / S.015 / pH: x  Gluc: x / Ketone: Negative  / Bili: Negative / Urobili: Negative   Blood: x / Protein: 500 mg/dL / Nitrite: Negative   Leuk Esterase: Trace / RBC: 2-5 /HPF / WBC 3-5 /HPF   Sq Epi: x / Non Sq Epi: Few /HPF / Bacteria: Few /HPF      CAPILLARY BLOOD GLUCOSE      POCT Blood Glucose.: 121 mg/dL (2019 13:09)  POCT Blood Glucose.: 127 mg/dL (2019 11:57)  POCT Blood Glucose.: 108 mg/dL (2019 08:17)  POCT Blood Glucose.: 109 mg/dL (2019 23:43)  POCT Blood Glucose.: 89 mg/dL (2019 22:25)        Urinalysis Basic - ( 2019 06:28 )    Color: Yellow / Appearance: Clear / S.015 / pH: x  Gluc: x / Ketone: Negative  / Bili: Negative / Urobili: Negative   Blood: x / Protein: 500 mg/dL / Nitrite: Negative   Leuk Esterase: Trace / RBC: 2-5 /HPF / WBC 3-5 /HPF   Sq Epi: x / Non Sq Epi: Few /HPF / Bacteria: Few /HPF

## 2019-04-17 NOTE — PROGRESS NOTE ADULT - ASSESSMENT
# ESRD. HD today.  Robaxin pre-HD  # anemia of CKD procrit on HD  # LLE cellulitis. plans for D/C home on ABX on HD.  #CKDMBD- cont renvela. sensipar/parsibiv upon discharge.  #HTN controlled can resume  metoprolol upon discharge.  d/c planning

## 2019-04-17 NOTE — PROGRESS NOTE ADULT - SUBJECTIVE AND OBJECTIVE BOX
[facility] : Progress Note :: [phone], (office [phone]),   Dr La / Dr Hui / Dr Barber / Dr Villalobos / Dr Hang CASTELLANO / Dr Mehta / Dr Sanchez / Dr Alber dumont  _____________________________________________________________________________________________  no events overnight.    aspirin (Short breath)  aspirin (Swelling)  penicillin (Short breath)  penicillins (Swelling)  shellfish (Unknown)    Hospital Medications:   MEDICATIONS  (STANDING):  clopidogrel Tablet 75 milliGRAM(s) Oral daily  epoetin cyndie Injectable 4000 Unit(s) IV Push <User Schedule>  furosemide    Tablet 80 milliGRAM(s) Oral daily  gabapentin 400 milliGRAM(s) Oral daily  hydrALAZINE 10 milliGRAM(s) Oral three times a day  insulin glargine Injectable (LANTUS) 8 Unit(s) SubCutaneous at bedtime  insulin lispro (HumaLOG) corrective regimen sliding scale   SubCutaneous three times a day before meals  insulin lispro Injectable (HumaLOG) 10 Unit(s) SubCutaneous three times a day before meals  methocarbamol 1500 milliGRAM(s) Oral <User Schedule>  pantoprazole    Tablet 40 milliGRAM(s) Oral before breakfast  QUEtiapine 200 milliGRAM(s) Oral daily  sevelamer carbonate 800 milliGRAM(s) Oral three times a day with meals  simvastatin 40 milliGRAM(s) Oral at bedtime  vancomycin  IVPB 1500 milliGRAM(s) IV Intermittent <User Schedule>        VITALS:  T(F): 97.4 (19 @ 11:22), Max: 98.4 (19 @ 23:41)  HR: 81 (19 @ 11:22)  BP: 165/95 (19 @ 11:22)  RR: 18 (19 @ 11:22)  SpO2: 95% (19 @ 11:22)  Wt(kg): --      PHYSICAL EXAM:  Constitutional: NAD  HEENT: anicteric sclera, oropharynx clear.  Neck: No JVD  Respiratory: CTAB,  Cardiovascular: S1, S2, RRR  Gastrointestinal: BS+, soft, NT/ND  Extremities: biletral  peripheral edema, minimal erythema.  Neurological: A/O x 3, no focal deficits         LABS:      139  |  96  |  46<H>  ----------------------------<  98  3.8   |  34<H>  |  8.82<H>    Ca    8.2<L>      2019 06:19      Creatinine Trend: 8.82 <--, 8.97 <--                        9.6    9.75  )-----------( 371      ( 2019 06:19 )             29.9     Urine Studies:  Urinalysis Basic - ( 2019 06:28 )    Color: Yellow / Appearance: Clear / S.015 / pH:   Gluc:  / Ketone: Negative  / Bili: Negative / Urobili: Negative   Blood:  / Protein: 500 mg/dL / Nitrite: Negative   Leuk Esterase: Trace / RBC: 2-5 /HPF / WBC 3-5 /HPF   Sq Epi:  / Non Sq Epi: Few /HPF / Bacteria: Few /HPF        RADIOLOGY & ADDITIONAL STUDIES:

## 2019-04-17 NOTE — PROGRESS NOTE ADULT - ASSESSMENT
kenny nd examined vsstable afebrile  comfortable not in any distress denies any complaints  no cp or sob  not in any pain  no tenderness  has b/l non pitting edema in both legs  minimal redness in lt leg  dp ok  a/p mild cellulitis of lt leg  seen by GILBERT covington during HD  .  had mri recently.   not significant.  no fluctuation, no tenderness afebrile  wbc ok  blood cxs ok ,  pt is Diabetic , morbid obese.  if high fever chills come to er.   D/c planning   discharge after hd and one dose.   vascular, pulmonary for r/o sas, htn  add hydralazine and all meds using meds , podiatry.  d/w nephro.  morbid obesity, DM, esrd on hd. poor prognosis

## 2019-04-17 NOTE — CONSULT NOTE ADULT - RS GEN PE MLT RESP DETAILS PC
no wheezes/good air movement/clear to auscultation bilaterally/breath sounds equal/no rales/no rhonchi

## 2019-04-17 NOTE — DISCHARGE NOTE PROVIDER - HOSPITAL COURSE
HPI:    38 Male Multiple admissions to the hospital in the past with PMH of Type 2 DM, Migraine, ESRD on HD for 3 yrs (left arm graft, TTS), Morbid obesity, HTN, Chr dyspnea (uses home O2 prn) presented to hospital for bilateral foot swelling and pain. Patient recently discharged from [facility] on Friday after being treated for cellulitis of left foot with vancomycin, with unremarkable MRI and suggested to be stable by podiatry and infectious disease for discharge. Patient now returns saying that his leg swelling has not improved after HD yesterday. He has bilateral foot pain and has difficulty walking. His left leg is more swollen than right and he is using a cane to walk. Patient Denies any fever, chills, nausea, vomiting or any systemic symptoms.     In Ed his vitals are stable. 137/75 HR 83. CXR shows pulmonary vascular congestion. doppler of lower extremity is negative for any DVT. Labs are unremarkable except Pro BNP is elevated to 6637 (16 Apr 2019 13:57)    -pt HD stable, started on IV vanco as per ID, to be given with dialysis. pt medically stable for discharge with outpt follow up HPI:    38 Male Multiple admissions to the hospital in the past with PMH of Type 2 DM, Migraine, ESRD on HD for 3 yrs (left arm graft, TTS), Morbid obesity, HTN, Chr dyspnea (uses home O2 prn) presented to hospital for bilateral foot swelling and pain. Patient recently discharged from [facility] on Friday after being treated for cellulitis of left foot with vancomycin, with unremarkable MRI and suggested to be stable by podiatry and infectious disease for discharge. Patient now returns saying that his leg swelling has not improved after HD yesterday. He has bilateral foot pain and has difficulty walking. His left leg is more swollen than right and he is using a cane to walk. Patient Denies any fever, chills, nausea, vomiting or any systemic symptoms.     In Ed his vitals are stable. 137/75 HR 83. CXR shows pulmonary vascular congestion. doppler of lower extremity is negative for any DVT. Labs are unremarkable except Pro BNP is elevated to 6637 (16 Apr 2019 13:57)    -pt HD stable, started on IV vanco as per ID, to be given with dialysis, will need 3 more doses. 1 bottle of blood cx positive for coag negative staph, as per Dr Amaral, likely contamination, patient is clear for dc. pt medically stable for discharge with outpt follow up as per Dr Andino. Plan has been d/w Dr Andino. Please see chart for full hospital course as this is just a brief summary.

## 2019-04-17 NOTE — CONSULT NOTE ADULT - SUBJECTIVE AND OBJECTIVE BOX
Chief Complaint: "Bilateral leg pain"    HPI:  38 Male Multiple admissions to the hospital in the past with PMH of Type 2 DM, Migraine, ESRD on HD for 3 yrs (left arm graft, TTS), Morbid obesity, HTN, Chr dyspnea (uses home O2 prn) presented to hospital for bilateral foot swelling and pain. Patient recently discharged from [facility] on Friday after being treated for cellulitis of left foot with vancomycin, with unremarkable MRI and suggested to be stable by podiatry and infectious disease for discharge. Patient now returns saying that his leg swelling has not improved after HD yesterday. He has bilateral foot pain and has difficulty walking. His left leg is more swollen than right and he is using a cane to walk. Patient Denies any fever, chills, nausea, vomiting or any systemic symptoms.   In Ed his vitals are stable. 137/75 HR 83. CXR shows pulmonary vascular congestion. doppler of lower extremity is negative for any DVT. Labs are unremarkable except Pro BNP is elevated to 6637 (2019 13:57).    38 year old male with multiple admissions for various reasons including LE pain, edema, cellulitis.  Pt asks for pain meds - desiring IV opioids.   Pt on esrd - HD.  + chronic le edema.  Pt has been treated for cellulitis in past.        PAST MEDICAL & SURGICAL HISTORY:  Migraine  Obesity  ESRD (end stage renal disease)  HTN (hypertension)  DM (diabetes mellitus)  Bipolar disorder  Schizophrenia  ESRD on dialysis  Morbid obesity with BMI of 40.0-44.9, adult  Diabetes  H/O hernia repair  H/O hernia repair      FAMILY HISTORY:  Family history of COPD (chronic obstructive pulmonary disease)  Family history of diabetes mellitus      SOCIAL HISTORY:  [ ] Denies Smoking, Alcohol, or Drug Use    Allergies    aspirin (Short breath)  aspirin (Swelling)  penicillin (Short breath)  penicillins (Swelling)  shellfish (Unknown)    Intolerances        PAIN MEDICATIONS:  acetaminophen   Tablet .. 650 milliGRAM(s) Oral once PRN  gabapentin 400 milliGRAM(s) Oral daily  methocarbamol 1500 milliGRAM(s) Oral <User Schedule>  oxyCODONE    5 mG/acetaminophen 325 mG 1 Tablet(s) Oral every 6 hours PRN  QUEtiapine 200 milliGRAM(s) Oral daily      Heme:  clopidogrel Tablet 75 milliGRAM(s) Oral daily    Antibiotics:    Cardiovascular:  furosemide    Tablet 80 milliGRAM(s) Oral daily    GI:  pantoprazole    Tablet 40 milliGRAM(s) Oral before breakfast    Endocrine:  insulin glargine Injectable (LANTUS) 8 Unit(s) SubCutaneous at bedtime  insulin lispro (HumaLOG) corrective regimen sliding scale   SubCutaneous three times a day before meals  insulin lispro Injectable (HumaLOG) 10 Unit(s) SubCutaneous three times a day before meals  simvastatin 40 milliGRAM(s) Oral at bedtime    All Other Medications:  epoetin cyndie Injectable 4000 Unit(s) IV Push <User Schedule>  sevelamer carbonate 800 milliGRAM(s) Oral three times a day with meals      REVIEW OF SYSTEMS:    CONSTITUTIONAL: No fever, weight loss, or fatigue + obese  RESPIRATORY: No cough, wheezing, chills or hemoptysis; No shortness of breath  CARDIOVASCULAR: No chest pain, palpitations, dizziness, or leg swelling  GASTROINTESTINAL: No abdominal or epigastric pain. No nausea, vomiting, or hematemesis; No diarrhea or constipation.  GENITOURINARY: No dysuria, frequency, hematuria, or incontinence  NEUROLOGICAL: No headaches, memory loss, loss of strength, numbness, or tremors  MUSCULOSKELETAL:  + le swelling + pain - le      Vital Signs Last 24 Hrs  T(C): 36.3 (2019 11:22), Max: 36.9 (2019 23:41)  T(F): 97.4 (2019 11:22), Max: 98.4 (2019 23:41)  HR: 81 (2019 11:22) (79 - 88)  BP: 165/95 (2019 11:22) (135/79 - 165/98)  BP(mean): --  RR: 18 (2019 11:22) (16 - 18)  SpO2: 95% (2019 11:22) (95% - 100%)    PAIN SCORE:         SCALE USED: (1-10 VNRS)             PHYSICAL EXAM:    GENERAL: NAD, well-groomed, well-developed  NERVOUS SYSTEM:  Alert & Oriented X3, Good concentration;   CHEST/LUNG: Clear to percussion bilaterally; No rales, rhonchi, wheezing, or rubs  HEART: Regular rate and rhythm; No murmurs, rubs, or gallops  ABDOMEN: Soft, Nontender, Nondistended; Bowel sounds present  EXTREMITIES:  2+ Peripheral Pulses, No clubbing, cyanosis, + LE edema - bilateral.      LABS:                          9.6    9.75  )-----------( 371      ( 2019 06:19 )             29.9     04-16    139  |  96  |  46<H>  ----------------------------<  98  3.8   |  34<H>  |  8.82<H>    Ca    8.2<L>      2019 06:19      PT/INR - ( 2019 06:19 )   PT: 12.3 sec;   INR: 1.10 ratio         PTT - ( 2019 06:19 )  PTT:35.8 sec  Urinalysis Basic - ( 2019 06:28 )    Color: Yellow / Appearance: Clear / S.015 / pH: x  Gluc: x / Ketone: Negative  / Bili: Negative / Urobili: Negative   Blood: x / Protein: 500 mg/dL / Nitrite: Negative   Leuk Esterase: Trace / RBC: 2-5 /HPF / WBC 3-5 /HPF   Sq Epi: x / Non Sq Epi: Few /HPF / Bacteria: Few /HPF        RADIOLOGY:    Drug Screen:        RECOMMENDATIONS    [ ]  [place]  Reviewed and Copied to Chart

## 2019-04-17 NOTE — PROVIDER CONTACT NOTE (CRITICAL VALUE NOTIFICATION) - TEST AND RESULT REPORTED:
Preliminary result for blood culture collected on 4/16/19, anaerobic bottle growth gram positive cocci in clusters.

## 2019-04-17 NOTE — CONSULT NOTE ADULT - SUBJECTIVE AND OBJECTIVE BOX
HPI:  38 Male Multiple admissions to the hospital in the past with PMH of Type 2 DM, Migraine, ESRD on HD for 3 yrs (left arm graft, TTS), Morbid obesity, HTN, Chr dyspnea (uses home O2 prn) presented to hospital for bilateral foot swelling and pain. Patient recently discharged from [facility] on Friday after being treated for cellulitis of left foot with vancomycin, with unremarkable MRI and suggested to be stable by podiatry and infectious disease for discharge. Patient now returns saying that his leg swelling has not improved after HD yesterday. He has bilateral foot pain and has difficulty walking. His left leg is more swollen than right and he is using a cane to walk. Patient Denies any fever, chills, nausea, vomiting or any systemic symptoms.   In Ed his vitals are stable. 137/75 HR 83. CXR shows pulmonary vascular congestion. doppler of lower extremity is negative for any DVT. Labs are unremarkable except Pro BNP is elevated to 6637 (2019 13:57)      PAST MEDICAL & SURGICAL HISTORY:  Migraine  Obesity  ESRD (end stage renal disease)  HTN (hypertension)  DM (diabetes mellitus)  Bipolar disorder  Schizophrenia  ESRD on dialysis  Morbid obesity with BMI of 40.0-44.9, adult  Diabetes  H/O hernia repair  H/O hernia repair      aspirin (Short breath)  aspirin (Swelling)  penicillin (Short breath)  penicillins (Swelling)  shellfish (Unknown)      Meds:  acetaminophen   Tablet .. 650 milliGRAM(s) Oral once PRN  clopidogrel Tablet 75 milliGRAM(s) Oral daily  epoetin cyndie Injectable 4000 Unit(s) IV Push <User Schedule>  furosemide    Tablet 80 milliGRAM(s) Oral daily  gabapentin 400 milliGRAM(s) Oral daily  insulin glargine Injectable (LANTUS) 8 Unit(s) SubCutaneous at bedtime  insulin lispro (HumaLOG) corrective regimen sliding scale   SubCutaneous three times a day before meals  insulin lispro Injectable (HumaLOG) 10 Unit(s) SubCutaneous three times a day before meals  methocarbamol 1500 milliGRAM(s) Oral <User Schedule>  oxyCODONE    5 mG/acetaminophen 325 mG 1 Tablet(s) Oral every 6 hours PRN  pantoprazole    Tablet 40 milliGRAM(s) Oral before breakfast  QUEtiapine 200 milliGRAM(s) Oral daily  sevelamer carbonate 800 milliGRAM(s) Oral three times a day with meals  simvastatin 40 milliGRAM(s) Oral at bedtime      SOCIAL HISTORY:  Smoker:  YES / NO        PACK YEARS:                         WHEN QUIT?  ETOH use:  YES / NO               FREQUENCY / QUANTITY:  Ilicit Drug use:  YES / NO  Occupation:  Assisted device use (Cane / Walker):  Live with:    FAMILY HISTORY:  Family history of COPD (chronic obstructive pulmonary disease)  Family history of diabetes mellitus      VITALS:  Vital Signs Last 24 Hrs  T(C): 36.3 (2019 11:22), Max: 36.9 (2019 23:41)  T(F): 97.4 (2019 11:22), Max: 98.4 (2019 23:41)  HR: 81 (2019 11:22) (79 - 88)  BP: 165/95 (2019 11:22) (135/79 - 165/98)  BP(mean): --  RR: 18 (2019 11:22) (16 - 18)  SpO2: 95% (2019 11:22) (95% - 100%)    LABS/DIAGNOSTIC TESTS:                          9.6    9.75  )-----------( 371      ( 2019 06:19 )             29.9     WBC Count: 9.75 K/uL ( @ 06:19)          139  |  96  |  46<H>  ----------------------------<  98  3.8   |  34<H>  |  8.82<H>    Ca    8.2<L>      2019 06:19        Urinalysis Basic - ( 2019 06:28 )    Color: Yellow / Appearance: Clear / S.015 / pH: x  Gluc: x / Ketone: Negative  / Bili: Negative / Urobili: Negative   Blood: x / Protein: 500 mg/dL / Nitrite: Negative   Leuk Esterase: Trace / RBC: 2-5 /HPF / WBC 3-5 /HPF   Sq Epi: x / Non Sq Epi: Few /HPF / Bacteria: Few /HPF            PT/INR - ( 2019 06:19 )   PT: 12.3 sec;   INR: 1.10 ratio         PTT - ( 2019 06:19 )  PTT:35.8 sec    LACTATE:    ABG -     CULTURES:   .Blood   @ 11:10   No growth to date.  --  --      .Blood  04-10 @ 00:43   No growth at 5 days.  --  --      .Blood   @ 11:24   No growth at 5 days.  --  --      .Urine   @ 10:11   No growth  --  --      .Urine Clean Catch (Midstream)   @ 09:58   No growth  --  --          RADIOLOGY:      ROS  [  ] UNABLE TO ELICIT HPI:  38 Male Multiple admissions to the hospital in the past with PMH of Type 2 DM, Migraine, ESRD on HD for 3 yrs (left arm graft, TTS), Morbid obesity, HTN, Chr dyspnea (uses home O2 prn) presented to hospital for bilateral foot swelling and pain. Patient recently discharged from [facility] on Friday after being treated for cellulitis of left foot with vancomycin, with unremarkable MRI and suggested to be stable by podiatry and infectious disease for discharge. Patient now returns saying that his leg swelling has not improved after HD yesterday. He has bilateral foot pain and has difficulty walking. His left leg is more swollen than right and he is using a cane to walk. Patient Denies any fever, chills, nausea, vomiting or any systemic symptoms.   In Ed his vitals are stable. 137/75 HR 83. CXR shows pulmonary vascular congestion. doppler of lower extremity is negative for any DVT. Labs are unremarkable except Pro BNP is elevated to 6637.    History as above, pt c/o bilat leg pain, he has swelling of both feet and left leg, he has no fevers or chills, no leukocytosis, he was just recently discharged after getting a few days of abxs for a swollen left foot and had an MRI of his foot that was negative for osteomyelitis and abscess.      PAST MEDICAL & SURGICAL HISTORY:  Migraine  Obesity  ESRD (end stage renal disease)  HTN (hypertension)  DM (diabetes mellitus)  Bipolar disorder  Schizophrenia  ESRD on dialysis  Morbid obesity with BMI of 40.0-44.9, adult  Diabetes  H/O hernia repair  H/O hernia repair      aspirin (Short breath)  aspirin (Swelling)  penicillin (Short breath)  penicillins (Swelling)  shellfish (Unknown)      Meds:  acetaminophen   Tablet .. 650 milliGRAM(s) Oral once PRN  clopidogrel Tablet 75 milliGRAM(s) Oral daily  epoetin cyndie Injectable 4000 Unit(s) IV Push <User Schedule>  furosemide    Tablet 80 milliGRAM(s) Oral daily  gabapentin 400 milliGRAM(s) Oral daily  insulin glargine Injectable (LANTUS) 8 Unit(s) SubCutaneous at bedtime  insulin lispro (HumaLOG) corrective regimen sliding scale   SubCutaneous three times a day before meals  insulin lispro Injectable (HumaLOG) 10 Unit(s) SubCutaneous three times a day before meals  methocarbamol 1500 milliGRAM(s) Oral <User Schedule>  oxyCODONE    5 mG/acetaminophen 325 mG 1 Tablet(s) Oral every 6 hours PRN  pantoprazole    Tablet 40 milliGRAM(s) Oral before breakfast  QUEtiapine 200 milliGRAM(s) Oral daily  sevelamer carbonate 800 milliGRAM(s) Oral three times a day with meals  simvastatin 40 milliGRAM(s) Oral at bedtime      SOCIAL HISTORY:  Smoker:  YES  ETOH use:  YES   Ilicit Drug use: denies    FAMILY HISTORY:  Family history of COPD (chronic obstructive pulmonary disease)  Family history of diabetes mellitus      VITALS:  Vital Signs Last 24 Hrs  T(C): 36.3 (2019 11:22), Max: 36.9 (2019 23:41)  T(F): 97.4 (2019 11:22), Max: 98.4 (2019 23:41)  HR: 81 (2019 11:22) (79 - 88)  BP: 165/95 (2019 11:22) (135/79 - 165/98)  BP(mean): --  RR: 18 (2019 11:22) (16 - 18)  SpO2: 95% (2019 11:22) (95% - 100%)    LABS/DIAGNOSTIC TESTS:                          9.6    9.75  )-----------( 371      ( 2019 06:19 )             29.9     WBC Count: 9.75 K/uL ( @ 06:19)          139  |  96  |  46<H>  ----------------------------<  98  3.8   |  34<H>  |  8.82<H>    Ca    8.2<L>      2019 06:19        Urinalysis Basic - ( 2019 06:28 )    Color: Yellow / Appearance: Clear / S.015 / pH: x  Gluc: x / Ketone: Negative  / Bili: Negative / Urobili: Negative   Blood: x / Protein: 500 mg/dL / Nitrite: Negative   Leuk Esterase: Trace / RBC: 2-5 /HPF / WBC 3-5 /HPF   Sq Epi: x / Non Sq Epi: Few /HPF / Bacteria: Few /HPF            PT/INR - ( 2019 06:19 )   PT: 12.3 sec;   INR: 1.10 ratio         PTT - ( 2019 06:19 )  PTT:35.8 sec    LACTATE:    ABG -     CULTURES:   .Blood   @ 11:10   No growth to date.  --  --      .Blood  04-10 @ 00:43   No growth at 5 days.  --  --      .Blood   @ 11:24   No growth at 5 days.  --  --          ROS  [  ] UNABLE TO ELICIT

## 2019-04-17 NOTE — CONSULT NOTE ADULT - SKIN COMMENTS
patchy erythema on dorsal aspect of boot feet, mild warmth, no draining wounds, small 1 cm scrap on left foot dorsum

## 2019-04-17 NOTE — DISCHARGE NOTE PROVIDER - NSDCFUADDAPPT_GEN_ALL_CORE_FT
-seek an appointment with VASCULAR doctor for long standing bilateral leg edema and occasional redness

## 2019-04-17 NOTE — CONSULT NOTE ADULT - PROBLEM SELECTOR RECOMMENDATION 9
- NO iv opioids  - can give Robaxin for muscle spasms  - no nsaids   - percocet po prn  - stool softeners  - oob

## 2019-04-17 NOTE — DISCHARGE NOTE NURSING/CASE MANAGEMENT/SOCIAL WORK - NSDCDPATPORTLINK_GEN_ALL_CORE
You can access the [facility], offered by [facility], by registering with the following website: [email or web address]

## 2019-04-17 NOTE — CONSULT NOTE ADULT - ASSESSMENT
Cellulitis of both feet (mild)    plan - will start vancomycin 1500mgs iv 3 x week with dialysis on T-T-S Cellulitis of both feet (mild)    plan - will start vancomycin 1500mgs iv 3 x week with dialysis on T-T-S  if contemplating discharge today or tomorrow then can given vancomycin in his outpatient dialysis center x 2 doses more on Saturday and next Tuesday.

## 2019-04-17 NOTE — DISCHARGE NOTE PROVIDER - CARE PROVIDER_API CALL
na, na  follow up with PCP with in 1 week  Phone: (   )    -  Fax: (   )    -  Follow Up Time: 1 week

## 2019-04-18 VITALS
SYSTOLIC BLOOD PRESSURE: 158 MMHG | OXYGEN SATURATION: 92 % | RESPIRATION RATE: 18 BRPM | DIASTOLIC BLOOD PRESSURE: 91 MMHG | TEMPERATURE: 98 F | HEART RATE: 92 BPM

## 2019-04-18 LAB
CULTURE RESULTS: SIGNIFICANT CHANGE UP
GLUCOSE BLDC GLUCOMTR-MCNC: 109 MG/DL — HIGH (ref 70–99)
GLUCOSE BLDC GLUCOMTR-MCNC: 118 MG/DL — HIGH (ref 70–99)
GLUCOSE BLDC GLUCOMTR-MCNC: 128 MG/DL — HIGH (ref 70–99)
GLUCOSE BLDC GLUCOMTR-MCNC: 135 MG/DL — HIGH (ref 70–99)
ORGANISM # SPEC MICROSCOPIC CNT: SIGNIFICANT CHANGE UP
ORGANISM # SPEC MICROSCOPIC CNT: SIGNIFICANT CHANGE UP
SPECIMEN SOURCE: SIGNIFICANT CHANGE UP

## 2019-04-18 PROCEDURE — 99285 EMERGENCY DEPT VISIT HI MDM: CPT | Mod: 25

## 2019-04-18 PROCEDURE — 81001 URINALYSIS AUTO W/SCOPE: CPT

## 2019-04-18 PROCEDURE — 85027 COMPLETE CBC AUTOMATED: CPT

## 2019-04-18 PROCEDURE — 83605 ASSAY OF LACTIC ACID: CPT

## 2019-04-18 PROCEDURE — 96374 THER/PROPH/DIAG INJ IV PUSH: CPT

## 2019-04-18 PROCEDURE — 99261: CPT

## 2019-04-18 PROCEDURE — 96375 TX/PRO/DX INJ NEW DRUG ADDON: CPT

## 2019-04-18 PROCEDURE — 94640 AIRWAY INHALATION TREATMENT: CPT

## 2019-04-18 PROCEDURE — 85730 THROMBOPLASTIN TIME PARTIAL: CPT

## 2019-04-18 PROCEDURE — 36415 COLL VENOUS BLD VENIPUNCTURE: CPT

## 2019-04-18 PROCEDURE — 71045 X-RAY EXAM CHEST 1 VIEW: CPT

## 2019-04-18 PROCEDURE — 82962 GLUCOSE BLOOD TEST: CPT

## 2019-04-18 PROCEDURE — 87040 BLOOD CULTURE FOR BACTERIA: CPT

## 2019-04-18 PROCEDURE — 93005 ELECTROCARDIOGRAM TRACING: CPT

## 2019-04-18 PROCEDURE — 87150 DNA/RNA AMPLIFIED PROBE: CPT

## 2019-04-18 PROCEDURE — 93970 EXTREMITY STUDY: CPT

## 2019-04-18 PROCEDURE — 85610 PROTHROMBIN TIME: CPT

## 2019-04-18 PROCEDURE — 83880 ASSAY OF NATRIURETIC PEPTIDE: CPT

## 2019-04-18 PROCEDURE — 80048 BASIC METABOLIC PNL TOTAL CA: CPT

## 2019-04-18 RX ORDER — AMLODIPINE BESYLATE 2.5 MG/1
5 TABLET ORAL DAILY
Qty: 0 | Refills: 0 | Status: DISCONTINUED | OUTPATIENT
Start: 2019-04-18 | End: 2019-04-18

## 2019-04-18 RX ORDER — ACETAMINOPHEN 500 MG
1000 TABLET ORAL ONCE
Qty: 0 | Refills: 0 | Status: COMPLETED | OUTPATIENT
Start: 2019-04-18 | End: 2019-04-18

## 2019-04-18 RX ADMIN — QUETIAPINE FUMARATE 200 MILLIGRAM(S): 200 TABLET, FILM COATED ORAL at 12:21

## 2019-04-18 RX ADMIN — Medication 80 MILLIGRAM(S): at 06:35

## 2019-04-18 RX ADMIN — OXYCODONE AND ACETAMINOPHEN 1 TABLET(S): 5; 325 TABLET ORAL at 06:36

## 2019-04-18 RX ADMIN — Medication 10 MILLIGRAM(S): at 06:34

## 2019-04-18 RX ADMIN — Medication 10 UNIT(S): at 12:21

## 2019-04-18 RX ADMIN — SEVELAMER CARBONATE 800 MILLIGRAM(S): 2400 POWDER, FOR SUSPENSION ORAL at 17:15

## 2019-04-18 RX ADMIN — GABAPENTIN 400 MILLIGRAM(S): 400 CAPSULE ORAL at 12:21

## 2019-04-18 RX ADMIN — OXYCODONE AND ACETAMINOPHEN 1 TABLET(S): 5; 325 TABLET ORAL at 13:58

## 2019-04-18 RX ADMIN — OXYCODONE AND ACETAMINOPHEN 1 TABLET(S): 5; 325 TABLET ORAL at 14:50

## 2019-04-18 RX ADMIN — PANTOPRAZOLE SODIUM 40 MILLIGRAM(S): 20 TABLET, DELAYED RELEASE ORAL at 06:34

## 2019-04-18 RX ADMIN — SEVELAMER CARBONATE 800 MILLIGRAM(S): 2400 POWDER, FOR SUSPENSION ORAL at 12:21

## 2019-04-18 RX ADMIN — OXYCODONE AND ACETAMINOPHEN 1 TABLET(S): 5; 325 TABLET ORAL at 07:06

## 2019-04-18 RX ADMIN — CLOPIDOGREL BISULFATE 75 MILLIGRAM(S): 75 TABLET, FILM COATED ORAL at 12:21

## 2019-04-18 RX ADMIN — Medication 10 UNIT(S): at 17:15

## 2019-04-18 RX ADMIN — Medication 10 MILLIGRAM(S): at 13:58

## 2019-04-18 RX ADMIN — Medication 10 UNIT(S): at 08:40

## 2019-04-18 RX ADMIN — SEVELAMER CARBONATE 800 MILLIGRAM(S): 2400 POWDER, FOR SUSPENSION ORAL at 08:40

## 2019-04-18 NOTE — PHYSICAL THERAPY INITIAL EVALUATION ADULT - DIAGNOSIS, PT EVAL
Patient presents with slightly impaired balance during ambulation secondary to pain in L foot upon WB.

## 2019-04-18 NOTE — PROGRESS NOTE ADULT - ASSESSMENT
# ESRD. s/p HD in AM. NO objection to D/C and resume HD in AM.  # anemia of CKD procrit on HD  # LLE cellulitis. will two more doses of vanco in outpatient HD

## 2019-04-18 NOTE — PROGRESS NOTE ADULT - REASON FOR ADMISSION
bilateral pedal edema and pain in lower extremities

## 2019-04-18 NOTE — PROGRESS NOTE ADULT - SUBJECTIVE AND OBJECTIVE BOX
kenny nd eaxmined at noon  comfortable physica unchanged  no foot or leg pain afebrile  d/w ID  blood cxs contamination, but as has cellulitis will give IV vanco during HD for 3-4 doses  d/w pt f/u podiatry, vascular, pulm for SAS   wt reduction.   low salt 1800 kac , low fat , chol diet

## 2019-04-18 NOTE — PROGRESS NOTE ADULT - SUBJECTIVE AND OBJECTIVE BOX
[facility] : Progress Note :: [phone], (office [phone]),   Dr La / Dr Hui / Dr Barber / Dr Villalobos / Dr Hang CASTELLANO / Dr Mehta / Dr Sanchez / Dr Alber dumont  _____________________________________________________________________________________________  S/P HD yesterday. plans for D/C today    aspirin (Short breath)  aspirin (Swelling)  penicillin (Short breath)  penicillins (Swelling)  shellfish (Unknown)    Hospital Medications:   MEDICATIONS  (STANDING):  amLODIPine   Tablet 5 milliGRAM(s) Oral daily  clopidogrel Tablet 75 milliGRAM(s) Oral daily  epoetin cyndie Injectable 4000 Unit(s) IV Push <User Schedule>  furosemide    Tablet 80 milliGRAM(s) Oral daily  gabapentin 400 milliGRAM(s) Oral daily  hydrALAZINE 10 milliGRAM(s) Oral three times a day  insulin glargine Injectable (LANTUS) 8 Unit(s) SubCutaneous at bedtime  insulin lispro (HumaLOG) corrective regimen sliding scale   SubCutaneous three times a day before meals  insulin lispro Injectable (HumaLOG) 10 Unit(s) SubCutaneous three times a day before meals  methocarbamol 1500 milliGRAM(s) Oral <User Schedule>  pantoprazole    Tablet 40 milliGRAM(s) Oral before breakfast  QUEtiapine 200 milliGRAM(s) Oral daily  sevelamer carbonate 800 milliGRAM(s) Oral three times a day with meals  simvastatin 40 milliGRAM(s) Oral at bedtime  vancomycin  IVPB 1500 milliGRAM(s) IV Intermittent <User Schedule>        VITALS:  T(F): 98.2 (19 @ 08:10), Max: 98.2 (19 @ 08:10)  HR: 93 (19 @ 13:56)  BP: 151/87 (19 @ 13:56)  RR: 18 (19 @ 08:10)  SpO2: 96% (19 @ 09:42)  Wt(kg): --      PHYSICAL EXAM:  Constitutional: NAD  HEENT: anicteric sclera, oropharynx clear.  Neck: No JVD  Respiratory: CTAB, no wheezes, rales or rhonchi  Cardiovascular: S1, S2, RRR  Gastrointestinal: BS+, soft, NT/ND  Extremities:  peripheral edema++. redness left leg   Neurological: A/O x 3, no focal deficits  Vascular Access: LUEAVF wit thrill and bruit    LABS:        Creatinine Trend: 8.82 <--, 8.97 <--    Urine Studies:  Urinalysis Basic - ( 2019 06:28 )    Color: Yellow / Appearance: Clear / S.015 / pH:   Gluc:  / Ketone: Negative  / Bili: Negative / Urobili: Negative   Blood:  / Protein: 500 mg/dL / Nitrite: Negative   Leuk Esterase: Trace / RBC: 2-5 /HPF / WBC 3-5 /HPF   Sq Epi:  / Non Sq Epi: Few /HPF / Bacteria: Few /HPF        RADIOLOGY & ADDITIONAL STUDIES:

## 2019-04-21 LAB
CULTURE RESULTS: SIGNIFICANT CHANGE UP
SPECIMEN SOURCE: SIGNIFICANT CHANGE UP

## 2019-04-27 ENCOUNTER — INPATIENT (INPATIENT)
Facility: HOSPITAL | Age: 39
LOS: 0 days | Discharge: HOME | DRG: 682 | End: 2019-04-27
Attending: INTERNAL MEDICINE | Admitting: INTERNAL MEDICINE
Payer: MEDICARE

## 2019-04-27 ENCOUNTER — TRANSCRIPTION ENCOUNTER (OUTPATIENT)
Age: 39
End: 2019-04-27

## 2019-04-27 VITALS
DIASTOLIC BLOOD PRESSURE: 100 MMHG | HEART RATE: 101 BPM | SYSTOLIC BLOOD PRESSURE: 164 MMHG | TEMPERATURE: 97 F | OXYGEN SATURATION: 97 % | RESPIRATION RATE: 17 BRPM

## 2019-04-27 VITALS
TEMPERATURE: 98 F | WEIGHT: 315 LBS | SYSTOLIC BLOOD PRESSURE: 160 MMHG | RESPIRATION RATE: 18 BRPM | HEIGHT: 78 IN | OXYGEN SATURATION: 96 % | HEART RATE: 83 BPM | DIASTOLIC BLOOD PRESSURE: 106 MMHG

## 2019-04-27 DIAGNOSIS — N18.6 END STAGE RENAL DISEASE: ICD-10-CM

## 2019-04-27 DIAGNOSIS — E87.70 FLUID OVERLOAD, UNSPECIFIED: ICD-10-CM

## 2019-04-27 DIAGNOSIS — Z98.89 OTHER SPECIFIED POSTPROCEDURAL STATES: Chronic | ICD-10-CM

## 2019-04-27 DIAGNOSIS — Z98.890 OTHER SPECIFIED POSTPROCEDURAL STATES: Chronic | ICD-10-CM

## 2019-04-27 LAB
ALBUMIN SERPL ELPH-MCNC: 3 G/DL — LOW (ref 3.5–5)
ALP SERPL-CCNC: 96 U/L — SIGNIFICANT CHANGE UP (ref 40–120)
ALT FLD-CCNC: 22 U/L DA — SIGNIFICANT CHANGE UP (ref 10–60)
ANION GAP SERPL CALC-SCNC: 13 MMOL/L — SIGNIFICANT CHANGE UP (ref 5–17)
APTT BLD: 34.1 SEC — SIGNIFICANT CHANGE UP (ref 27.5–36.3)
AST SERPL-CCNC: 14 U/L — SIGNIFICANT CHANGE UP (ref 10–40)
BASOPHILS # BLD AUTO: 0.06 K/UL — SIGNIFICANT CHANGE UP (ref 0–0.2)
BASOPHILS NFR BLD AUTO: 0.5 % — SIGNIFICANT CHANGE UP (ref 0–2)
BILIRUB SERPL-MCNC: 0.4 MG/DL — SIGNIFICANT CHANGE UP (ref 0.2–1.2)
BUN SERPL-MCNC: 68 MG/DL — HIGH (ref 7–18)
CALCIUM SERPL-MCNC: 8.4 MG/DL — SIGNIFICANT CHANGE UP (ref 8.4–10.5)
CHLORIDE SERPL-SCNC: 96 MMOL/L — SIGNIFICANT CHANGE UP (ref 96–108)
CO2 SERPL-SCNC: 26 MMOL/L — SIGNIFICANT CHANGE UP (ref 22–31)
CREAT SERPL-MCNC: 11.2 MG/DL — HIGH (ref 0.5–1.3)
EOSINOPHIL # BLD AUTO: 0.48 K/UL — SIGNIFICANT CHANGE UP (ref 0–0.5)
EOSINOPHIL NFR BLD AUTO: 3.9 % — SIGNIFICANT CHANGE UP (ref 0–6)
GLUCOSE SERPL-MCNC: 96 MG/DL — SIGNIFICANT CHANGE UP (ref 70–99)
HCT VFR BLD CALC: 30.3 % — LOW (ref 39–50)
HGB BLD-MCNC: 9.4 G/DL — LOW (ref 13–17)
IMM GRANULOCYTES NFR BLD AUTO: 0.3 % — SIGNIFICANT CHANGE UP (ref 0–1.5)
INR BLD: 1.02 RATIO — SIGNIFICANT CHANGE UP (ref 0.88–1.16)
LIDOCAIN IGE QN: 190 U/L — SIGNIFICANT CHANGE UP (ref 73–393)
LYMPHOCYTES # BLD AUTO: 1.7 K/UL — SIGNIFICANT CHANGE UP (ref 1–3.3)
LYMPHOCYTES # BLD AUTO: 13.7 % — SIGNIFICANT CHANGE UP (ref 13–44)
MCHC RBC-ENTMCNC: 30.4 PG — SIGNIFICANT CHANGE UP (ref 27–34)
MCHC RBC-ENTMCNC: 31 GM/DL — LOW (ref 32–36)
MCV RBC AUTO: 98.1 FL — SIGNIFICANT CHANGE UP (ref 80–100)
MONOCYTES # BLD AUTO: 0.88 K/UL — SIGNIFICANT CHANGE UP (ref 0–0.9)
MONOCYTES NFR BLD AUTO: 7.1 % — SIGNIFICANT CHANGE UP (ref 2–14)
NEUTROPHILS # BLD AUTO: 9.23 K/UL — HIGH (ref 1.8–7.4)
NEUTROPHILS NFR BLD AUTO: 74.5 % — SIGNIFICANT CHANGE UP (ref 43–77)
NRBC # BLD: 0 /100 WBCS — SIGNIFICANT CHANGE UP (ref 0–0)
PLATELET # BLD AUTO: 395 K/UL — SIGNIFICANT CHANGE UP (ref 150–400)
POTASSIUM SERPL-MCNC: 5.5 MMOL/L — HIGH (ref 3.5–5.3)
POTASSIUM SERPL-SCNC: 5.5 MMOL/L — HIGH (ref 3.5–5.3)
PROT SERPL-MCNC: 7.3 G/DL — SIGNIFICANT CHANGE UP (ref 6–8.3)
PROTHROM AB SERPL-ACNC: 11.3 SEC — SIGNIFICANT CHANGE UP (ref 10–12.9)
RBC # BLD: 3.09 M/UL — LOW (ref 4.2–5.8)
RBC # FLD: 14.9 % — HIGH (ref 10.3–14.5)
SODIUM SERPL-SCNC: 135 MMOL/L — SIGNIFICANT CHANGE UP (ref 135–145)
TROPONIN I SERPL-MCNC: 0.03 NG/ML — SIGNIFICANT CHANGE UP (ref 0–0.04)
WBC # BLD: 12.39 K/UL — HIGH (ref 3.8–10.5)
WBC # FLD AUTO: 12.39 K/UL — HIGH (ref 3.8–10.5)

## 2019-04-27 PROCEDURE — 80053 COMPREHEN METABOLIC PANEL: CPT

## 2019-04-27 PROCEDURE — 83690 ASSAY OF LIPASE: CPT

## 2019-04-27 PROCEDURE — 99285 EMERGENCY DEPT VISIT HI MDM: CPT | Mod: 25

## 2019-04-27 PROCEDURE — 93010 ELECTROCARDIOGRAM REPORT: CPT

## 2019-04-27 PROCEDURE — 96375 TX/PRO/DX INJ NEW DRUG ADDON: CPT

## 2019-04-27 PROCEDURE — 99285 EMERGENCY DEPT VISIT HI MDM: CPT

## 2019-04-27 PROCEDURE — 93970 EXTREMITY STUDY: CPT

## 2019-04-27 PROCEDURE — 85027 COMPLETE CBC AUTOMATED: CPT

## 2019-04-27 PROCEDURE — 93970 EXTREMITY STUDY: CPT | Mod: 26

## 2019-04-27 PROCEDURE — 36415 COLL VENOUS BLD VENIPUNCTURE: CPT

## 2019-04-27 PROCEDURE — 84484 ASSAY OF TROPONIN QUANT: CPT

## 2019-04-27 PROCEDURE — 71045 X-RAY EXAM CHEST 1 VIEW: CPT

## 2019-04-27 PROCEDURE — 93005 ELECTROCARDIOGRAM TRACING: CPT

## 2019-04-27 PROCEDURE — 99261: CPT

## 2019-04-27 PROCEDURE — 85610 PROTHROMBIN TIME: CPT

## 2019-04-27 PROCEDURE — 71045 X-RAY EXAM CHEST 1 VIEW: CPT | Mod: 26

## 2019-04-27 PROCEDURE — 96374 THER/PROPH/DIAG INJ IV PUSH: CPT

## 2019-04-27 PROCEDURE — 85730 THROMBOPLASTIN TIME PARTIAL: CPT

## 2019-04-27 RX ORDER — INSULIN ASPART 100 [IU]/ML
10 INJECTION, SOLUTION SUBCUTANEOUS
Qty: 0 | Refills: 0 | COMMUNITY

## 2019-04-27 RX ORDER — CLOPIDOGREL BISULFATE 75 MG/1
1 TABLET, FILM COATED ORAL
Qty: 0 | Refills: 0 | COMMUNITY

## 2019-04-27 RX ORDER — SEVELAMER CARBONATE 2400 MG/1
2 POWDER, FOR SUSPENSION ORAL
Qty: 0 | Refills: 0 | COMMUNITY

## 2019-04-27 RX ORDER — INSULIN GLARGINE 100 [IU]/ML
20 INJECTION, SOLUTION SUBCUTANEOUS
Qty: 0 | Refills: 0 | COMMUNITY

## 2019-04-27 RX ORDER — SIMVASTATIN 20 MG/1
1 TABLET, FILM COATED ORAL
Qty: 0 | Refills: 0 | COMMUNITY

## 2019-04-27 RX ORDER — OMEPRAZOLE 10 MG/1
1 CAPSULE, DELAYED RELEASE ORAL
Qty: 0 | Refills: 0 | COMMUNITY

## 2019-04-27 RX ORDER — ONDANSETRON 8 MG/1
4 TABLET, FILM COATED ORAL ONCE
Qty: 0 | Refills: 0 | Status: COMPLETED | OUTPATIENT
Start: 2019-04-27 | End: 2019-04-27

## 2019-04-27 RX ORDER — HYDROXYZINE HCL 10 MG
1 TABLET ORAL
Qty: 0 | Refills: 0 | COMMUNITY

## 2019-04-27 RX ORDER — GABAPENTIN 400 MG/1
1 CAPSULE ORAL
Qty: 0 | Refills: 0 | COMMUNITY

## 2019-04-27 RX ORDER — ERYTHROPOIETIN 10000 [IU]/ML
4000 INJECTION, SOLUTION INTRAVENOUS; SUBCUTANEOUS ONCE
Qty: 0 | Refills: 0 | Status: COMPLETED | OUTPATIENT
Start: 2019-04-27 | End: 2019-04-27

## 2019-04-27 RX ADMIN — ERYTHROPOIETIN 4000 UNIT(S): 10000 INJECTION, SOLUTION INTRAVENOUS; SUBCUTANEOUS at 17:38

## 2019-04-27 RX ADMIN — ONDANSETRON 4 MILLIGRAM(S): 8 TABLET, FILM COATED ORAL at 10:31

## 2019-04-27 RX ADMIN — Medication 0.5 MILLIGRAM(S): at 11:41

## 2019-04-27 NOTE — ED ADULT NURSE NOTE - NSIMPLEMENTINTERV_GEN_ALL_ED
Implemented All Universal Safety Interventions:  [place] to call system. Call bell, personal items and telephone within reach. Instruct patient to call for assistance. Room bathroom lighting operational. Non-slip footwear when patient is off stretcher. Physically safe environment: no spills, clutter or unnecessary equipment. Stretcher in lowest position, wheels locked, appropriate side rails in place.

## 2019-04-27 NOTE — H&P ADULT - PROBLEM SELECTOR PLAN 1
Missed dialysis on Friday.  Njeru consulted and agreed with dialysis  Explained to patient need for stay for dialysis for additional work up. Patient refers that he needs to work and wont be able to stay for further work up

## 2019-04-27 NOTE — H&P ADULT - ASSESSMENT
HPI: 38 y/o M w/ bipolar disorder, DM, ESRD, dialyzed M/W/F, HTN, migraine, obesity, schizophrenia, here w/ worsening cellulitis. AS PER ED attending say that patient was sent by Dr. La after he missed dialysis on Friday. Patient on Fluid overload and will be admitted for dialysis. Also complaining of Right leg swelling and erythema. Denies nausea, vomiting, chest pain, PIMNETEL, palpitations, dizziness, headache, cough, wheezing, joint pain or swelling, fever, chills.     Patient will be admitted to the floor due to missed dialysis

## 2019-04-27 NOTE — DISCHARGE NOTE PROVIDER - HOSPITAL COURSE
38 y/o M w/ bipolar disorder, DM, ESRD, dialyzed M/W/F, HTN, migraine, obesity, schizophrenia, here w/ worsening cellulitis. AS PER ED attending say that patient was sent by Dr. La after he missed dialysis on Friday. Patient on Fluid overload and will be admitted for dialysis. Also complaining of Right leg swelling and erythema. Denies nausea, vomiting, chest pain, PIMENTEL, palpitations, dizziness, headache, cough, wheezing, fever or  chills.         Patient admitted to the floor due to missed dialysis. Missed dialysis on Friday. Patient got dialysis and decided to leave against medical advice and refused any additional work up. Explained to patient need for stay after dialysis for additional work up. Patient refers that he needs to work and wont be able to stay for further work up. Understand risk of leaving against Medical advice.  Careful explanation is given to the patient about the dangers of leaving. This dangers include and are not limited to respiratory decompensation, chest pain leading to cardiac arrest and even death. Even after discussing this patient decided for leaving AMA.

## 2019-04-27 NOTE — ED PROVIDER NOTE - EXTREMITY EXAM
b/l significant pitting edema with mild induration to b/l anterior shins, approximately 6cm x 8cm to anterior medial area which is minimally warm

## 2019-04-27 NOTE — ED PROVIDER NOTE - CARDIAC PEDAL EDEMA
b/l significant pitting edema with mild induration to b/l anterior shins, approximately 6cm x 8cm to anterior medial area which is minimally warm b/l significant pitting edema with redness and minimal induration to b/l anterior shins, approximately 6cm x 8cm each side to anterior medial area which is minimally warm

## 2019-04-27 NOTE — CONSULT NOTE ADULT - SUBJECTIVE AND OBJECTIVE BOX
[facility] : Progress Note :: [phone], (office [phone]),   Dr La / Dr Hui / Dr Barber / Dr Villalobos / Dr Hang CASTELLANO / Dr Mehta / Dr Sanchez / Dr Alber dumont  _____________________________________________________________________________________________  Patient is a 39y Male with ESRD on HD MWF at [facility],( poor compliance) missed diaysis yesterday. presents to ED with bilateral leg swelling and erythema. also with SOB. recent multiple admissions for the same complains. in ED doppler US did not reveal any DVT but CXR showed pulm edema. renal consulted for HD     PAST MEDICAL & SURGICAL HISTORY:  Migraine  Obesity  ESRD (end stage renal disease)  HTN (hypertension)  DM (diabetes mellitus)  Bipolar disorder  Schizophrenia  ESRD on dialysis  Morbid obesity with BMI of 40.0-44.9, adult  Diabetes  H/O hernia repair  H/O hernia repair    aspirin (Short breath)  aspirin (Swelling)  penicillin (Short breath)  penicillins (Swelling)  shellfish (Unknown)    Home Medications Reviewed  Hospital Medications:   MEDICATIONS  (STANDING):    SOCIAL HISTORY:  Denies ETOh,Smoking,   FAMILY HISTORY:  Family history of COPD (chronic obstructive pulmonary disease)  Family history of diabetes mellitus        VITALS:  T(F): 98.3 (04-27-19 @ 15:05), Max: 98.3 (04-27-19 @ 15:05)  HR: 87 (04-27-19 @ 15:05)  BP: 175/94 (04-27-19 @ 15:05)  RR: 19 (04-27-19 @ 15:05)  SpO2: 98% (04-27-19 @ 15:05)  Wt(kg): --    Height (cm): 200.66 (04-27 @ 07:02)  Weight (kg): 167.8 (04-27 @ 07:02)  BMI (kg/m2): 41.7 (04-27 @ 07:02)  BSA (m2): 2.96 (04-27 @ 07:02)    PHYSICAL EXAM:  Constitutional: obese.  HEENT: anicteric sclera,   Neck: JVD+  Respiratory: bilat  rales or rhonchi  Cardiovascular: S1, S2, RRR  Gastrointestinal: BS+, soft, NT/ND  Extremities: bilat  peripheral edema++ extensing to the thighs.. bilateral erythema with no warmth or tenderness  Neurological: A/O x 3, no focal deficits  Vascular Access: LUEAVF with thrill and bruit    LABS:  04-27    135  |  96  |  68<H>  ----------------------------<  96  5.5<H>   |  26  |  11.20<H>    Ca    8.4      27 Apr 2019 15:12    TPro  7.3  /  Alb  3.0<L>  /  TBili  0.4  /  DBili      /  AST  14  /  ALT  22  /  AlkPhos  96  04-27    Creatinine Trend: 11.20 <--                        9.4    12.39 )-----------( 395      ( 27 Apr 2019 15:12 )             30.3     Urine Studies:      RADIOLOGY & ADDITIONAL STUDIES:

## 2019-04-27 NOTE — H&P ADULT - NSHPPHYSICALEXAM_GEN_ALL_CORE
General - NAD, sitting up in bed, well groomed  Neck - No noticeable or palpable swelling, redness or rash around throat or on face  Lymph Nodes - No lymphadenopathy  Cardiovascular - RRR no JVD, no carotid bruits  Lungs - Bilateral crackles.  Abdomen - Normal bowel sounds, abdomen soft and nontender  Extremities - Right lower extremity edema and erythema  Musculo Skeletal - 5/5 strength upper and lower extremity.  .  Neurological – Alert and oriented x 3

## 2019-04-27 NOTE — H&P ADULT - ATTENDING COMMENTS
seen and examined  d/w ER MD, nephro, med Resident  38 yr old male htn, dm, hld, morbid obesity esrd on HD  recently 2  admission in this hosp for leg cellulitis , came with mild sob, without cp, cough, fever from today.  pt missed HD yesterday sec to problems in transporattion.   pt seen in hd  doing ok  not in any distress,  rr 18, 160/80, 78. 98  physical examine ok except has rashes in bot feet and anlkes, without itching,  no fever or chills.  feet,ankle leg mild redness without cellulitic changes.  non tender, dp ok.  lungs clear  a/p missed hd  getting HD  can be d/cd  but before needs labs and ekg to d/w Dr Rey  skin abnorm in feet and legs  dermatology  also as pt is diabetic, needs vascular podiatry,  dermalogy consult as out pt  also possibly has SAS  needs sleep studies.  wt reduction

## 2019-04-27 NOTE — CONSULT NOTE ADULT - ASSESSMENT
# ESRD. grossly  fluid overloaded sec to missed dialysis and fluid and salt indiscretion. HD today. compliance with fluif/salt restriction re-inforced.  # anemia of CKD. procrit on HD  # CKDMBD. resume outpatient binders  # DM2 per primary team  # leucocytosis, doubt cellulitis.

## 2019-04-27 NOTE — ED ADULT NURSE NOTE - OBJECTIVE STATEMENT
Patient complains of bilateral leg swelling x 1 week, as of last night both legs became red accompanied with pain. Left leg more painful then right. Pedal pulses palpated bilaterally Patient also complains of nausea and sob on exertion.

## 2019-04-27 NOTE — ED PROVIDER NOTE - PROGRESS NOTE DETAILS
dw dr arnett, za duplex neg. jef renal at bedside - will get labs at dialysis. missed friday dialysis will get it now.

## 2019-04-27 NOTE — ED PROVIDER NOTE - OBJECTIVE STATEMENT
38 y/o M w/ bipolar disorder, DM, ESRD, dialyzed M/W/F, HTN, migraine, obesity, schizophrenia, here w/ worsening cellulitis. Pt was recently admitted for foot cellulitis, was receiving Vancomycin during dialysis, last received on Monday, and was dialyzed Wednesday and Friday w/o Vancomycin. Pt noted both legs to be red in anterior shin area which was of concern to him. Pt walks on his legs for work, and is here w/ concern for trouble walking 2/2 swelling and pain. Pt notes having generalized weakness and epigastric pain as well. 40 y/o M w/ bipolar disorder, DM, ESRD, dialyzed M/W/F, HTN, migraine, obesity, schizophrenia, here bl leg redness, bl leg swelling, some sob. Pt was recently admitted for foot cellulitis, was receiving Vancomycin during dialysis, last received on Monday, and was dialyzed Wednesday and Friday w/o Vancomycin. Pt noted both legs to be red in anterior shin area which was of concern to him. Pt walks on his legs for work, and is here w/ concern for trouble walking 2/2 swelling and pain. Pt notes having generalized weakness and epigastric pain as well.

## 2019-04-27 NOTE — DISCHARGE NOTE PROVIDER - NSDCCPCAREPLAN_GEN_ALL_CORE_FT
PRINCIPAL DISCHARGE DIAGNOSIS  Diagnosis: ESRD needing dialysis  Assessment and Plan of Treatment: Patient admitted to the floor due to missed dialysis. Missed dialysis on Friday. Patient got dialysis and decided to leave against medical advice and refused any additional work up. Explained to patient need for stay after dialysis for additional work up. Patient refers that he needs to work and wont be able to stay for further work up. Understand risk of leaving against Medical advice. Careful explanation is given to the patient about the dangers of leaving. This dangers include and are not limited to respiratory decompensation, chest pain leading to cardiac arrest and even death. Even after discussing this patient decided for leaving AMA.

## 2019-04-27 NOTE — H&P ADULT - HISTORY OF PRESENT ILLNESS
38 y/o M w/ bipolar disorder, DM, ESRD, dialyzed M/W/F, HTN, migraine, obesity, schizophrenia, here w/ worsening cellulitis. AS PER ED attending say that patient was sent by Dr. La after he missed dialysis on Friday. Patient on Fluid overload and will be admitted for dialysis 38 y/o M w/ bipolar disorder, DM, ESRD, dialyzed M/W/F, HTN, migraine, obesity, schizophrenia, here w/ worsening cellulitis. AS PER ED attending say that patient was sent by Dr. La after he missed dialysis on Friday. Patient on Fluid overload and will be admitted for dialysis. Also complaining of Right leg swelling and erythema. Denies nausea, vomiting, chest pain, PIMENTEL, palpitations, dizziness, headache, cough, wheezing, joint pain or swelling, fever, chills.

## 2019-04-27 NOTE — ED ADULT TRIAGE NOTE - CHIEF COMPLAINT QUOTE
c/o redness and slight swelling to both lower leg since yesterday,denies pain as per patient ,on hemodialysis [MWF]

## 2019-04-27 NOTE — ED PROVIDER NOTE - CLINICAL SUMMARY MEDICAL DECISION MAKING FREE TEXT BOX
38 y/o M here w/ multiple complaints. Given that he is a chronically ill pt, will need to check blood glucose and anion gap for DKA. Will check duplex study of legs to r/o DVT. Also will check basic labs, chest x-ray, EKG, and troponin. Symptoms and history not consistent w/ ACS or PE unless DVT study is positive, in which case will evaluate for PE. Will do CT-scan of abd given reproducible epigastric pain, r/o cholecystitis, pancreatitis, other processes. Will coordinate with out pt doctor for removal of fluids and re-continuation of abx either in hospital or out pt. 40 y/o M here w/ multiple complaints. Given that he is a chronically ill pt, will need to check blood glucose and anion gap for DKA. Will check duplex study of legs to r/o DVT. Also will check basic labs, chest x-ray, EKG, and troponin. Symptoms and history not consistent w/ ACS or PE unless DVT study is positive, in which case will evaluate for PE. Will do CT-scan of abd given reproducible epigastric pain, r/o cholecystitis, pancreatitis, other processes. Will coordinate with out pt doctor for removal of fluids and consider re-continuation of abx either in hospital or out pt. Given bl nature and confirmed completion of vanco - prob less likely to be cellulitis. dispo per renal.

## 2019-04-28 ENCOUNTER — EMERGENCY (EMERGENCY)
Facility: HOSPITAL | Age: 39
LOS: 1 days | Discharge: HOME | End: 2019-04-28
Attending: EMERGENCY MEDICINE
Payer: MEDICARE

## 2019-04-28 VITALS
DIASTOLIC BLOOD PRESSURE: 92 MMHG | HEART RATE: 124 BPM | SYSTOLIC BLOOD PRESSURE: 159 MMHG | TEMPERATURE: 97 F | HEIGHT: 78 IN | RESPIRATION RATE: 20 BRPM | WEIGHT: 136.91 LBS | OXYGEN SATURATION: 100 %

## 2019-04-28 VITALS
TEMPERATURE: 98 F | HEART RATE: 87 BPM | SYSTOLIC BLOOD PRESSURE: 156 MMHG | DIASTOLIC BLOOD PRESSURE: 98 MMHG | RESPIRATION RATE: 18 BRPM | OXYGEN SATURATION: 100 %

## 2019-04-28 DIAGNOSIS — Z98.890 OTHER SPECIFIED POSTPROCEDURAL STATES: Chronic | ICD-10-CM

## 2019-04-28 DIAGNOSIS — Z98.89 OTHER SPECIFIED POSTPROCEDURAL STATES: Chronic | ICD-10-CM

## 2019-04-28 PROCEDURE — 93010 ELECTROCARDIOGRAM REPORT: CPT

## 2019-04-28 PROCEDURE — 99283 EMERGENCY DEPT VISIT LOW MDM: CPT

## 2019-04-28 RX ORDER — TRAMADOL HYDROCHLORIDE 50 MG/1
50 TABLET ORAL ONCE
Qty: 0 | Refills: 0 | Status: DISCONTINUED | OUTPATIENT
Start: 2019-04-28 | End: 2019-04-28

## 2019-04-28 RX ADMIN — TRAMADOL HYDROCHLORIDE 50 MILLIGRAM(S): 50 TABLET ORAL at 15:36

## 2019-04-28 RX ADMIN — TRAMADOL HYDROCHLORIDE 50 MILLIGRAM(S): 50 TABLET ORAL at 14:36

## 2019-04-28 NOTE — ED ADULT NURSE NOTE - OBJECTIVE STATEMENT
Pt c/o left leg pain x Wednesday. Upon assessment, swelling noted to BL legs, redness as well. Dialysis pt, M, W, F. Left fistula noted. No acute distress noted, denies chest pain, no shortness of breath indicated. Safety maintained.

## 2019-04-28 NOTE — ED PROVIDER NOTE - PROGRESS NOTE DETAILS
Shravan: spoke with Dr Wynne and states pt didn't need to be here after he was dialyzed yesterday and ama.  pt had episode of epigastric discomfort not new.  performed ekg and unchanged.    dx venous stasis lower extremities.  f/u with vascular and pulm (leg swelling and KRYSTIN). ambulete called. pt sitting watching tv on phone

## 2019-04-28 NOTE — ED PROVIDER NOTE - EXTREMITY EXAM
BLE nonpitting edema L slight worse than R. No underlying skin changes on the L. R leg venous stasis appearing.

## 2019-04-28 NOTE — ED PROVIDER NOTE - OBJECTIVE STATEMENT
38 y/o M pt with PMHx of bipolar disorder, DM, ESRD, dialyzed M/W/F, HTN, migraine, obesity, schizophrenia presents to ED c/o L lower ankle pain after wearing very tight fitted compression sock. Pt describes pain as sharp and burning. Pt was here yesterday with bilateral leg redness and swelling. Pt was also recently admitted for foot cellulitis. Of note, pt left the hospital yesterday after dialysis because he had to go to work. Patient denies fever or any other complaints.

## 2019-04-28 NOTE — ED PROVIDER NOTE - CLINICAL SUMMARY MEDICAL DECISION MAKING FREE TEXT BOX
Pt with venous stasis causing nerve irritation. Will give tramadol. Duplex studies done yesterday were negative. Will have pt follow up with vacular.

## 2019-04-28 NOTE — ED ADULT NURSE NOTE - NSIMPLEMENTINTERV_GEN_ALL_ED
Implemented All Fall Risk Interventions:  [place] to call system. Call bell, personal items and telephone within reach. Instruct patient to call for assistance. Room bathroom lighting operational. Non-slip footwear when patient is off stretcher. Physically safe environment: no spills, clutter or unnecessary equipment. Stretcher in lowest position, wheels locked, appropriate side rails in place. Provide visual cue, wrist band, yellow gown, etc. Monitor gait and stability. Monitor for mental status changes and reorient to person, place, and time. Review medications for side effects contributing to fall risk. Reinforce activity limits and safety measures with patient and family.

## 2019-04-30 ENCOUNTER — INPATIENT (INPATIENT)
Facility: HOSPITAL | Age: 39
LOS: 2 days | Discharge: HOME | DRG: 727 | End: 2019-05-03
Attending: INTERNAL MEDICINE | Admitting: INTERNAL MEDICINE
Payer: MEDICARE

## 2019-04-30 VITALS
HEART RATE: 92 BPM | SYSTOLIC BLOOD PRESSURE: 148 MMHG | WEIGHT: 315 LBS | DIASTOLIC BLOOD PRESSURE: 92 MMHG | RESPIRATION RATE: 16 BRPM | HEIGHT: 78 IN | TEMPERATURE: 98 F | OXYGEN SATURATION: 99 %

## 2019-04-30 DIAGNOSIS — N44.2 BENIGN CYST OF TESTIS: ICD-10-CM

## 2019-04-30 DIAGNOSIS — N18.6 END STAGE RENAL DISEASE: ICD-10-CM

## 2019-04-30 DIAGNOSIS — Z98.890 OTHER SPECIFIED POSTPROCEDURAL STATES: Chronic | ICD-10-CM

## 2019-04-30 DIAGNOSIS — E11.9 TYPE 2 DIABETES MELLITUS WITHOUT COMPLICATIONS: ICD-10-CM

## 2019-04-30 DIAGNOSIS — Z29.9 ENCOUNTER FOR PROPHYLACTIC MEASURES, UNSPECIFIED: ICD-10-CM

## 2019-04-30 DIAGNOSIS — F20.9 SCHIZOPHRENIA, UNSPECIFIED: ICD-10-CM

## 2019-04-30 DIAGNOSIS — E66.01 MORBID (SEVERE) OBESITY DUE TO EXCESS CALORIES: ICD-10-CM

## 2019-04-30 DIAGNOSIS — L03.115 CELLULITIS OF RIGHT LOWER LIMB: ICD-10-CM

## 2019-04-30 DIAGNOSIS — I10 ESSENTIAL (PRIMARY) HYPERTENSION: ICD-10-CM

## 2019-04-30 DIAGNOSIS — E87.5 HYPERKALEMIA: ICD-10-CM

## 2019-04-30 LAB
ALBUMIN SERPL ELPH-MCNC: 3.2 G/DL — LOW (ref 3.5–5)
ALBUMIN SERPL ELPH-MCNC: 3.2 G/DL — LOW (ref 3.5–5)
ALP SERPL-CCNC: 93 U/L — SIGNIFICANT CHANGE UP (ref 40–120)
ALP SERPL-CCNC: 94 U/L — SIGNIFICANT CHANGE UP (ref 40–120)
ALT FLD-CCNC: 18 U/L DA — SIGNIFICANT CHANGE UP (ref 10–60)
ALT FLD-CCNC: 19 U/L DA — SIGNIFICANT CHANGE UP (ref 10–60)
ANION GAP SERPL CALC-SCNC: 10 MMOL/L — SIGNIFICANT CHANGE UP (ref 5–17)
ANION GAP SERPL CALC-SCNC: 10 MMOL/L — SIGNIFICANT CHANGE UP (ref 5–17)
APPEARANCE UR: CLEAR — SIGNIFICANT CHANGE UP
APTT BLD: 38.2 SEC — HIGH (ref 27.5–36.3)
AST SERPL-CCNC: 12 U/L — SIGNIFICANT CHANGE UP (ref 10–40)
AST SERPL-CCNC: 14 U/L — SIGNIFICANT CHANGE UP (ref 10–40)
BASOPHILS # BLD AUTO: 0.04 K/UL — SIGNIFICANT CHANGE UP (ref 0–0.2)
BASOPHILS # BLD AUTO: 0.05 K/UL — SIGNIFICANT CHANGE UP (ref 0–0.2)
BASOPHILS NFR BLD AUTO: 0.3 % — SIGNIFICANT CHANGE UP (ref 0–2)
BASOPHILS NFR BLD AUTO: 0.4 % — SIGNIFICANT CHANGE UP (ref 0–2)
BILIRUB SERPL-MCNC: 0.3 MG/DL — SIGNIFICANT CHANGE UP (ref 0.2–1.2)
BILIRUB SERPL-MCNC: 0.4 MG/DL — SIGNIFICANT CHANGE UP (ref 0.2–1.2)
BILIRUB UR-MCNC: NEGATIVE — SIGNIFICANT CHANGE UP
BUN SERPL-MCNC: 89 MG/DL — HIGH (ref 7–18)
BUN SERPL-MCNC: 90 MG/DL — HIGH (ref 7–18)
CALCIUM SERPL-MCNC: 8.6 MG/DL — SIGNIFICANT CHANGE UP (ref 8.4–10.5)
CALCIUM SERPL-MCNC: 8.7 MG/DL — SIGNIFICANT CHANGE UP (ref 8.4–10.5)
CHLORIDE SERPL-SCNC: 100 MMOL/L — SIGNIFICANT CHANGE UP (ref 96–108)
CHLORIDE SERPL-SCNC: 101 MMOL/L — SIGNIFICANT CHANGE UP (ref 96–108)
CO2 SERPL-SCNC: 24 MMOL/L — SIGNIFICANT CHANGE UP (ref 22–31)
CO2 SERPL-SCNC: 24 MMOL/L — SIGNIFICANT CHANGE UP (ref 22–31)
COLOR SPEC: YELLOW — SIGNIFICANT CHANGE UP
CREAT SERPL-MCNC: 12.3 MG/DL — HIGH (ref 0.5–1.3)
CREAT SERPL-MCNC: 12.6 MG/DL — HIGH (ref 0.5–1.3)
DIFF PNL FLD: ABNORMAL
EOSINOPHIL # BLD AUTO: 0.44 K/UL — SIGNIFICANT CHANGE UP (ref 0–0.5)
EOSINOPHIL # BLD AUTO: 0.49 K/UL — SIGNIFICANT CHANGE UP (ref 0–0.5)
EOSINOPHIL NFR BLD AUTO: 3.8 % — SIGNIFICANT CHANGE UP (ref 0–6)
EOSINOPHIL NFR BLD AUTO: 4.1 % — SIGNIFICANT CHANGE UP (ref 0–6)
FOLATE SERPL-MCNC: 11.9 NG/ML — SIGNIFICANT CHANGE UP
GLUCOSE BLDC GLUCOMTR-MCNC: 102 MG/DL — HIGH (ref 70–99)
GLUCOSE BLDC GLUCOMTR-MCNC: 113 MG/DL — HIGH (ref 70–99)
GLUCOSE BLDC GLUCOMTR-MCNC: 130 MG/DL — HIGH (ref 70–99)
GLUCOSE BLDC GLUCOMTR-MCNC: 89 MG/DL — SIGNIFICANT CHANGE UP (ref 70–99)
GLUCOSE SERPL-MCNC: 111 MG/DL — HIGH (ref 70–99)
GLUCOSE SERPL-MCNC: 95 MG/DL — SIGNIFICANT CHANGE UP (ref 70–99)
GLUCOSE UR QL: 100 MG/DL
HCT VFR BLD CALC: 30 % — LOW (ref 39–50)
HCT VFR BLD CALC: 31.5 % — LOW (ref 39–50)
HGB BLD-MCNC: 9.3 G/DL — LOW (ref 13–17)
HGB BLD-MCNC: 9.9 G/DL — LOW (ref 13–17)
IMM GRANULOCYTES NFR BLD AUTO: 0.2 % — SIGNIFICANT CHANGE UP (ref 0–1.5)
IMM GRANULOCYTES NFR BLD AUTO: 0.4 % — SIGNIFICANT CHANGE UP (ref 0–1.5)
INR BLD: 1 RATIO — SIGNIFICANT CHANGE UP (ref 0.88–1.16)
KETONES UR-MCNC: NEGATIVE — SIGNIFICANT CHANGE UP
LEUKOCYTE ESTERASE UR-ACNC: NEGATIVE — SIGNIFICANT CHANGE UP
LYMPHOCYTES # BLD AUTO: 1.99 K/UL — SIGNIFICANT CHANGE UP (ref 1–3.3)
LYMPHOCYTES # BLD AUTO: 16.6 % — SIGNIFICANT CHANGE UP (ref 13–44)
LYMPHOCYTES # BLD AUTO: 17.8 % — SIGNIFICANT CHANGE UP (ref 13–44)
LYMPHOCYTES # BLD AUTO: 2.08 K/UL — SIGNIFICANT CHANGE UP (ref 1–3.3)
MAGNESIUM SERPL-MCNC: 2.7 MG/DL — HIGH (ref 1.6–2.6)
MCHC RBC-ENTMCNC: 30.2 PG — SIGNIFICANT CHANGE UP (ref 27–34)
MCHC RBC-ENTMCNC: 30.8 PG — SIGNIFICANT CHANGE UP (ref 27–34)
MCHC RBC-ENTMCNC: 31 GM/DL — LOW (ref 32–36)
MCHC RBC-ENTMCNC: 31.4 GM/DL — LOW (ref 32–36)
MCV RBC AUTO: 97.4 FL — SIGNIFICANT CHANGE UP (ref 80–100)
MCV RBC AUTO: 98.1 FL — SIGNIFICANT CHANGE UP (ref 80–100)
MONOCYTES # BLD AUTO: 0.85 K/UL — SIGNIFICANT CHANGE UP (ref 0–0.9)
MONOCYTES # BLD AUTO: 0.9 K/UL — SIGNIFICANT CHANGE UP (ref 0–0.9)
MONOCYTES NFR BLD AUTO: 7.3 % — SIGNIFICANT CHANGE UP (ref 2–14)
MONOCYTES NFR BLD AUTO: 7.5 % — SIGNIFICANT CHANGE UP (ref 2–14)
NEUTROPHILS # BLD AUTO: 8.25 K/UL — HIGH (ref 1.8–7.4)
NEUTROPHILS # BLD AUTO: 8.51 K/UL — HIGH (ref 1.8–7.4)
NEUTROPHILS NFR BLD AUTO: 70.5 % — SIGNIFICANT CHANGE UP (ref 43–77)
NEUTROPHILS NFR BLD AUTO: 71.1 % — SIGNIFICANT CHANGE UP (ref 43–77)
NITRITE UR-MCNC: NEGATIVE — SIGNIFICANT CHANGE UP
NRBC # BLD: 0 /100 WBCS — SIGNIFICANT CHANGE UP (ref 0–0)
NRBC # BLD: 0 /100 WBCS — SIGNIFICANT CHANGE UP (ref 0–0)
NT-PROBNP SERPL-SCNC: HIGH PG/ML (ref 0–125)
PH UR: 8 — SIGNIFICANT CHANGE UP (ref 5–8)
PHOSPHATE SERPL-MCNC: 7.9 MG/DL — HIGH (ref 2.5–4.5)
PLATELET # BLD AUTO: 377 K/UL — SIGNIFICANT CHANGE UP (ref 150–400)
PLATELET # BLD AUTO: 385 K/UL — SIGNIFICANT CHANGE UP (ref 150–400)
POTASSIUM SERPL-MCNC: 5.9 MMOL/L — HIGH (ref 3.5–5.3)
POTASSIUM SERPL-MCNC: 6.4 MMOL/L — CRITICAL HIGH (ref 3.5–5.3)
POTASSIUM SERPL-SCNC: 5.9 MMOL/L — HIGH (ref 3.5–5.3)
POTASSIUM SERPL-SCNC: 6.4 MMOL/L — CRITICAL HIGH (ref 3.5–5.3)
PROT SERPL-MCNC: 7.6 G/DL — SIGNIFICANT CHANGE UP (ref 6–8.3)
PROT SERPL-MCNC: 7.7 G/DL — SIGNIFICANT CHANGE UP (ref 6–8.3)
PROT UR-MCNC: 500 MG/DL
PROTHROM AB SERPL-ACNC: 11.1 SEC — SIGNIFICANT CHANGE UP (ref 10–12.9)
RBC # BLD: 3.08 M/UL — LOW (ref 4.2–5.8)
RBC # BLD: 3.21 M/UL — LOW (ref 4.2–5.8)
RBC # FLD: 15.3 % — HIGH (ref 10.3–14.5)
RBC # FLD: 15.3 % — HIGH (ref 10.3–14.5)
SODIUM SERPL-SCNC: 134 MMOL/L — LOW (ref 135–145)
SODIUM SERPL-SCNC: 135 MMOL/L — SIGNIFICANT CHANGE UP (ref 135–145)
SP GR SPEC: 1.01 — SIGNIFICANT CHANGE UP (ref 1.01–1.02)
UROBILINOGEN FLD QL: NEGATIVE — SIGNIFICANT CHANGE UP
WBC # BLD: 11.69 K/UL — HIGH (ref 3.8–10.5)
WBC # BLD: 11.98 K/UL — HIGH (ref 3.8–10.5)
WBC # FLD AUTO: 11.69 K/UL — HIGH (ref 3.8–10.5)
WBC # FLD AUTO: 11.98 K/UL — HIGH (ref 3.8–10.5)

## 2019-04-30 PROCEDURE — 71045 X-RAY EXAM CHEST 1 VIEW: CPT | Mod: 26

## 2019-04-30 PROCEDURE — 99285 EMERGENCY DEPT VISIT HI MDM: CPT | Mod: 25

## 2019-04-30 PROCEDURE — 93010 ELECTROCARDIOGRAM REPORT: CPT

## 2019-04-30 RX ORDER — DEXTROSE 50 % IN WATER 50 %
25 SYRINGE (ML) INTRAVENOUS ONCE
Qty: 0 | Refills: 0 | Status: COMPLETED | OUTPATIENT
Start: 2019-04-30 | End: 2019-04-30

## 2019-04-30 RX ORDER — VANCOMYCIN HCL 1 G
1000 VIAL (EA) INTRAVENOUS ONCE
Qty: 0 | Refills: 0 | Status: COMPLETED | OUTPATIENT
Start: 2019-04-30 | End: 2019-04-30

## 2019-04-30 RX ORDER — INSULIN LISPRO 100/ML
VIAL (ML) SUBCUTANEOUS
Qty: 0 | Refills: 0 | Status: DISCONTINUED | OUTPATIENT
Start: 2019-04-30 | End: 2019-05-03

## 2019-04-30 RX ORDER — HYDRALAZINE HCL 50 MG
10 TABLET ORAL THREE TIMES A DAY
Qty: 0 | Refills: 0 | Status: DISCONTINUED | OUTPATIENT
Start: 2019-04-30 | End: 2019-05-03

## 2019-04-30 RX ORDER — CLOPIDOGREL BISULFATE 75 MG/1
75 TABLET, FILM COATED ORAL DAILY
Qty: 0 | Refills: 0 | Status: DISCONTINUED | OUTPATIENT
Start: 2019-04-30 | End: 2019-05-03

## 2019-04-30 RX ORDER — SODIUM CHLORIDE 9 MG/ML
3 INJECTION INTRAMUSCULAR; INTRAVENOUS; SUBCUTANEOUS ONCE
Qty: 0 | Refills: 0 | Status: COMPLETED | OUTPATIENT
Start: 2019-04-30 | End: 2019-04-30

## 2019-04-30 RX ORDER — SIMVASTATIN 20 MG/1
40 TABLET, FILM COATED ORAL AT BEDTIME
Qty: 0 | Refills: 0 | Status: DISCONTINUED | OUTPATIENT
Start: 2019-04-30 | End: 2019-05-03

## 2019-04-30 RX ORDER — INSULIN HUMAN 100 [IU]/ML
5 INJECTION, SOLUTION SUBCUTANEOUS ONCE
Qty: 0 | Refills: 0 | Status: COMPLETED | OUTPATIENT
Start: 2019-04-30 | End: 2019-04-30

## 2019-04-30 RX ORDER — SODIUM POLYSTYRENE SULFONATE 4.1 MEQ/G
30 POWDER, FOR SUSPENSION ORAL ONCE
Qty: 0 | Refills: 0 | Status: COMPLETED | OUTPATIENT
Start: 2019-04-30 | End: 2019-04-30

## 2019-04-30 RX ORDER — HEPARIN SODIUM 5000 [USP'U]/ML
5000 INJECTION INTRAVENOUS; SUBCUTANEOUS EVERY 8 HOURS
Qty: 0 | Refills: 0 | Status: DISCONTINUED | OUTPATIENT
Start: 2019-04-30 | End: 2019-05-03

## 2019-04-30 RX ORDER — PANTOPRAZOLE SODIUM 20 MG/1
40 TABLET, DELAYED RELEASE ORAL
Qty: 0 | Refills: 0 | Status: DISCONTINUED | OUTPATIENT
Start: 2019-04-30 | End: 2019-05-03

## 2019-04-30 RX ORDER — FUROSEMIDE 40 MG
40 TABLET ORAL ONCE
Qty: 0 | Refills: 0 | Status: COMPLETED | OUTPATIENT
Start: 2019-04-30 | End: 2019-04-30

## 2019-04-30 RX ORDER — CALCIUM GLUCONATE 100 MG/ML
1 VIAL (ML) INTRAVENOUS ONCE
Qty: 0 | Refills: 0 | Status: COMPLETED | OUTPATIENT
Start: 2019-04-30 | End: 2019-04-30

## 2019-04-30 RX ORDER — ERYTHROPOIETIN 10000 [IU]/ML
4000 INJECTION, SOLUTION INTRAVENOUS; SUBCUTANEOUS ONCE
Qty: 0 | Refills: 0 | Status: COMPLETED | OUTPATIENT
Start: 2019-04-30 | End: 2019-04-30

## 2019-04-30 RX ORDER — INSULIN GLARGINE 100 [IU]/ML
8 INJECTION, SOLUTION SUBCUTANEOUS AT BEDTIME
Qty: 0 | Refills: 0 | Status: DISCONTINUED | OUTPATIENT
Start: 2019-04-30 | End: 2019-05-03

## 2019-04-30 RX ORDER — QUETIAPINE FUMARATE 200 MG/1
200 TABLET, FILM COATED ORAL DAILY
Qty: 0 | Refills: 0 | Status: DISCONTINUED | OUTPATIENT
Start: 2019-04-30 | End: 2019-05-03

## 2019-04-30 RX ORDER — FUROSEMIDE 40 MG
80 TABLET ORAL DAILY
Qty: 0 | Refills: 0 | Status: DISCONTINUED | OUTPATIENT
Start: 2019-04-30 | End: 2019-05-03

## 2019-04-30 RX ADMIN — Medication 10 MILLIGRAM(S): at 22:36

## 2019-04-30 RX ADMIN — HEPARIN SODIUM 5000 UNIT(S): 5000 INJECTION INTRAVENOUS; SUBCUTANEOUS at 22:37

## 2019-04-30 RX ADMIN — SODIUM CHLORIDE 3 MILLILITER(S): 9 INJECTION INTRAMUSCULAR; INTRAVENOUS; SUBCUTANEOUS at 02:10

## 2019-04-30 RX ADMIN — SIMVASTATIN 40 MILLIGRAM(S): 20 TABLET, FILM COATED ORAL at 22:36

## 2019-04-30 RX ADMIN — INSULIN HUMAN 5 UNIT(S): 100 INJECTION, SOLUTION SUBCUTANEOUS at 03:38

## 2019-04-30 RX ADMIN — Medication 200 GRAM(S): at 03:38

## 2019-04-30 RX ADMIN — ERYTHROPOIETIN 4000 UNIT(S): 10000 INJECTION, SOLUTION INTRAVENOUS; SUBCUTANEOUS at 08:22

## 2019-04-30 RX ADMIN — HEPARIN SODIUM 5000 UNIT(S): 5000 INJECTION INTRAVENOUS; SUBCUTANEOUS at 06:09

## 2019-04-30 RX ADMIN — QUETIAPINE FUMARATE 200 MILLIGRAM(S): 200 TABLET, FILM COATED ORAL at 12:43

## 2019-04-30 RX ADMIN — Medication 10 MILLIGRAM(S): at 17:35

## 2019-04-30 RX ADMIN — Medication 40 MILLIGRAM(S): at 04:04

## 2019-04-30 RX ADMIN — Medication 10 MILLIGRAM(S): at 06:08

## 2019-04-30 RX ADMIN — Medication 250 MILLIGRAM(S): at 06:09

## 2019-04-30 RX ADMIN — HEPARIN SODIUM 5000 UNIT(S): 5000 INJECTION INTRAVENOUS; SUBCUTANEOUS at 17:35

## 2019-04-30 RX ADMIN — Medication 80 MILLIGRAM(S): at 06:08

## 2019-04-30 RX ADMIN — CLOPIDOGREL BISULFATE 75 MILLIGRAM(S): 75 TABLET, FILM COATED ORAL at 12:45

## 2019-04-30 RX ADMIN — PANTOPRAZOLE SODIUM 40 MILLIGRAM(S): 20 TABLET, DELAYED RELEASE ORAL at 12:44

## 2019-04-30 RX ADMIN — Medication 25 MILLILITER(S): at 03:37

## 2019-04-30 RX ADMIN — SODIUM POLYSTYRENE SULFONATE 30 GRAM(S): 4.1 POWDER, FOR SUSPENSION ORAL at 04:05

## 2019-04-30 NOTE — H&P ADULT - ATTENDING COMMENTS
kenny nd examined vsstable  afebrile  physical unchanged  came with scrotal pain.  and sob as he again missed HD yesterday with high K   urgent HD, testicle sono and urology called .  legs redness but not hot or tender to touch  no fever no wbc mildly high 11.8  one dose of vanco  will follow pt

## 2019-04-30 NOTE — ED ADULT NURSE NOTE - CHIEF COMPLAINT
The patient is a 39y Male complaining of The patient is a 39y Male complaining of swelling both lower legs for weeks and shortness of breath today.

## 2019-04-30 NOTE — ED ADULT NURSE NOTE - OBJECTIVE STATEMENT
came to ED due to swelling both lower legs and shortness of breath today missed his dialysis monday. Seen and examined by Dr Wynn.

## 2019-04-30 NOTE — H&P ADULT - PROBLEM SELECTOR PLAN 3
Rt testicular painful cyst 1x2cm with warmth  Ordered US testis   Started Vancomycin  Monitor Vanc troughs during dialysis Rt testicular painful cyst 1x2cm with warmth  Ordered US testis   Started Vancomycin  Monitor Vanc troughs during dialysis  ED consulted Dr Craig

## 2019-04-30 NOTE — H&P ADULT - PROBLEM SELECTOR PLAN 9
Improve VTE score: 3 (Heparin sq)  [ ] Previous VTE                                               3  [ ] Thrombophilia                                             2  [x] Lower limb paralysis                                   2    [ ] Current Cancer                                           2   [x] Immobilization > 24 hrs                              1  [ ] ICU/CCU stay > 24 hours                            1  [ ] Age > 60                                                       1

## 2019-04-30 NOTE — H&P ADULT - HISTORY OF PRESENT ILLNESS
38 Male with PMH of Type 2 DM, Migraine, ESRD on HD for 3 yrs (left arm graft, TTS), Morbid obesity, HTN, Chr dyspnea (uses home O2 prn) presented to hospital for ....... No fever, focal weakness, chest pain, new shortness of breath, abdominal pain. Despite being on dialysis, he still makes urine.     FH: Sister has RA and Lupus  SH: Smokes and uses other drugs often. Lives with family.     ED Course: Hemodynamically stable. Labs showed anemia and Hyperkalemia of 5.9. Serum Cr was 12.30. [place] [address] and CXR showed bilateral congestion. 39 Male with PMH of Type 2 DM, Migraine, ESRD on HD (left arm graft, T/T/S), Morbid obesity, HTN, Chr dyspnea (uses home O2 prn), Schizophrenia, Bacteremia due to cellulitis of feet presented to hospital for ....... No fever, focal weakness, chest pain, new shortness of breath, abdominal pain. Despite being on dialysis, he still makes urine.     FH: Sister has RA and Lupus  SH: Smokes and uses other drugs often. Lives with family.     ED Course: Hemodynamically stable. Labs showed anemia and Hyperkalemia of 5.9. Serum Cr was 12.30. [place] of [address] and CXR showed bilateral congestion. 39 Male with PMH of Type 2 DM, Migraine, ESRD on HD (left arm graft, M/W/F), Morbid obesity, HTN, Chr dyspnea (uses home O2 prn), Schizophrenia, Bacteremia due to foot cellulitis presented to hospital for shortness of breath and orthopnea after missing dialysis yesterday when he overslept. Pt feels like drowning in water [place] he lays flat. Symptoms are associated with subjective fevers and difficulty ambulation. Yesterday while taking shower he noticed painful Rt testicular nodule. No focal weakness, chest pain, abdominal pain. Despite being on dialysis, he still makes urine. Recurrent admissions in past due to non-compliance with dialysis.     FH: Sister has RA and Lupus, Father had Testicular mass  SH: Smokes, drinks alcohol and uses other drugs on weekends. Lives with family.     ED Course: Hemodynamically stable. Labs showed anemia and Hyperkalemia of 5.9. Serum Cr was 12.30. [place] of 235[address] and CXR showed bilateral congestion. Pt was given insulin, dextrose, calcium and Kayexalate.

## 2019-04-30 NOTE — H&P ADULT - PROBLEM SELECTOR PLAN 2
Serum K 5.9 due to missing hemodialysis   Tele monitoring until hyperkalemia resolves  Ordered Lasix, Kayexalate, Insulin, dextrose and calcium in ED  Monitor BMP

## 2019-04-30 NOTE — ED PROVIDER NOTE - OBJECTIVE STATEMENT
40 y/o M with PMHx of HTN, DM, bipolar disorder, schizophrenia, ESRD on hemodialysis presents to the ED with complaints of shortness of breath and persistent leg swelling. Patient reports that he missed dialysis yesterday because he overslept. Feels short of breath and like "his lungs are full of fluid." Last dialysis was 3 days ago while hospitalized, however patient left against medical advice to go to work. Also with reports of testicular pain; notes feeling lump to area. Denies hematuria, dysuria or any other acute complaints.

## 2019-04-30 NOTE — ED ADULT NURSE NOTE - ED STAT RN HANDOFF DETAILS
pt endorsed to DANIEL Ramirez at bedside for cont care, on cont. cardiac monitor, with oxygen at 2L/min via NC. Postassium 6.4 Dr Yu texted.

## 2019-04-30 NOTE — CONSULT NOTE ADULT - SUBJECTIVE AND OBJECTIVE BOX
[facility] : Progress Note :: [phone], (office [phone]),   Dr La / Dr Hui / Dr Barber / Dr Villalobos / Dr Hang CASTELLANO / Dr Mehta / Dr Sanchez / Dr Alber dumont  _____________________________________________________________________________________________  Patient is a 39y Male with ESRD, non-compliant with HD. Was just in the ED in this hospital on Saturday after missing HD. Presented to ED last night with shortness of breath and bilateral leg swelling. He also noticed a scrotal lump on self-exam. In ED CXR showed pulmonary edema and hyperkalemia, renal consulted for dialysis. He is s/p emergent HD this morning    PAST MEDICAL & SURGICAL HISTORY:  Migraine  HTN (hypertension)  DM (diabetes mellitus)  Bipolar disorder  Schizophrenia  ESRD on dialysis  Morbid obesity with BMI of 40.0-44.9, adult  H/O hernia repair    aspirin (Short breath)  aspirin (Swelling)  penicillin (Short breath)  penicillins (Swelling)  shellfish (Unknown)    Home Medications Reviewed  Hospital Medications:   MEDICATIONS  (STANDING):    SOCIAL HISTORY:  Denies ETOh,Smoking,   FAMILY HISTORY:  Family history of COPD (chronic obstructive pulmonary disease)  Family history of diabetes mellitus        VITALS:  T(F): 98.3 (19 @ 08:00), Max: 98.3 (19 @ 08:00)  HR: 89 (19 @ 08:00)  BP: 166/100 (19 @ 08:00)  RR: 17 (19 @ 08:00)  SpO2: 100% (19 @ 08:00)  Wt(kg): --    Height (cm): 200.66 ( @ 00:45)  Weight (kg): 167.8 ( @ 00:45)  BMI (kg/m2): 41.7 ( @ 00:45)  BSA (m2): 2.96 ( @ 00:45)    PHYSICAL EXAM:  Constitutional: morbidly obese  HEENT: anicteric sclera, oropharynx clear.  Neck: No JVD  Respiratory: CTAB, no wheezes, rales or rhonchi  Cardiovascular: S1, S2, RRR  Gastrointestinal: BS+, soft, NT/ND  Extremities: peripheral edema++  Neurological: A/O x 3, no focal deficits  Vascular Access: LUEAVF with thrill and bruit    LABS:      135  |  101  |  89<H>  ----------------------------<  95  6.4<HH>   |  24  |  12.60<H>    Ca    8.7      2019 05:45  Phos  7.9       Mg     2.7         TPro  7.6  /  Alb  3.2<L>  /  TBili  0.4  /  DBili      /  AST  14  /  ALT  19  /  AlkPhos  93      Creatinine Trend: 12.60 <--, 12.30 <--, 11.20 <--                        9.3    11.98 )-----------( 385      ( 2019 05:45 )             30.0     Urine Studies:  Urinalysis Basic - ( 2019 04:26 )    Color: Yellow / Appearance: Clear / S.010 / pH:   Gluc:  / Ketone: Negative  / Bili: Negative / Urobili: Negative   Blood:  / Protein: 500 mg/dL / Nitrite: Negative   Leuk Esterase: Negative / RBC: 2-5 /HPF / WBC 0-2 /HPF   Sq Epi:  / Non Sq Epi:  / Bacteria:         RADIOLOGY & ADDITIONAL STUDIES:

## 2019-04-30 NOTE — H&P ADULT - PROBLEM SELECTOR PLAN 4
Rt leg redness, warmth and swelling  Started Vancomycin  Monitor Vanc troughs during dialysis  F/u Blood Cx

## 2019-04-30 NOTE — ED ADULT NURSE REASSESSMENT NOTE - NS ED NURSE REASSESS COMMENT FT1
received pt from DANIEL Pavon, JUANY/Ox3, sleeping in stretcher, breathing unlabored. Responds to verbal stimuli. Pt has dialysis port in L arm & R hand 22g harry. NAD. cardiac monitoring in place.

## 2019-04-30 NOTE — ED PROVIDER NOTE - PMH
Bipolar disorder    DM (diabetes mellitus)    ESRD on dialysis    HTN (hypertension)    Migraine    Morbid obesity with BMI of 40.0-44.9, adult    Schizophrenia

## 2019-04-30 NOTE — CONSULT NOTE ADULT - ASSESSMENT
# ESRD, presents to ED with fluid overload from missed dialysis and non-compliance with salt and fluid restriction. Also with hyperkalemia. s/p emergent HD this morning. d/w pt to comply with salt and fluid management and prescribed dialysis treatment  # uncontrolled HTN. s/p UF on HD. cont BP meds  # CKDMBD. cont binders and phos restriction  #anemia of CKD. procrit on HD

## 2019-04-30 NOTE — H&P ADULT - ASSESSMENT
39 Male with PMH of Type 2 DM, Migraine, ESRD on HD (left arm graft, M/W/F), Morbid obesity, HTN, Chr dyspnea (uses home O2 prn), Schizophrenia, Bacteremia due to foot cellulitis presented to hospital for shortness of breath and orthopnea after missing dialysis.

## 2019-04-30 NOTE — ED PROVIDER NOTE - CLINICAL SUMMARY MEDICAL DECISION MAKING FREE TEXT BOX
40 y/o M presents with SOB after missing dialysis yesterday. Also reporting new testicular lesion and pain. Will obtain EKG, labs, urinalysis and reassess. 40 y/o M presents with SOB after missing dialysis yesterday. Also reporting new testicular lesion and pain. Will obtain EKG, labs, urinalysis and reassess.    EKG shows no ischemic/hyperkalemic changes, K 5.9, CXR shows increased interstitial markings similar to 4/27  given calcium/dextrose/insulin  Discussed above with Dr. Medina who agrees with plan for admission for hemodialysis  Nephrology paged, awaiting callback  Urology Dr. Beckford paged regarding scrotal abscess 38 y/o M presents with SOB after missing dialysis yesterday. Also reporting new testicular lesion and pain. Will obtain EKG, labs, urinalysis and reassess.    EKG shows no ischemic/hyperkalemic changes, K 5.9, CXR shows increased interstitial markings similar to 4/27  given calcium/dextrose/insulin/lasix  Discussed above with Dr. Medina who agrees with plan for admission for hemodialysis  Nephrology paged, awaiting callback- Dr. La's oncall colleage reports HD will scheduled for 6am today  Urology Dr. Beckford paged regarding scrotal abscess

## 2019-04-30 NOTE — ED ADULT NURSE NOTE - NSIMPLEMENTINTERV_GEN_ALL_ED
Implemented All Universal Safety Interventions:  [place] to call system. Call bell, personal items and telephone within reach. Instruct patient to call for assistance. Room bathroom lighting operational. Non-slip footwear when patient is off stretcher. Physically safe environment: no spills, clutter or unnecessary equipment. Stretcher in lowest position, wheels locked, appropriate side rails in place.

## 2019-04-30 NOTE — H&P ADULT - NSHPPHYSICALEXAM_GEN_ALL_CORE
Vital Signs Last 24 Hrs  T(C): 36.8 (30 Apr 2019 00:45), Max: 36.8 (30 Apr 2019 00:45)  T(F): 98.2 (30 Apr 2019 00:45), Max: 98.2 (30 Apr 2019 00:45)  HR: 92 (30 Apr 2019 00:45) (92 - 92)  BP: 148/92 (30 Apr 2019 00:45) (148/92 - 148/92)  RR: 16 (30 Apr 2019 00:45) (16 - 16)  SpO2: 99% (30 Apr 2019 00:45) (99% - 99%)    GENERAL: Well developed, Obese male, NAD  HEENT:  Normocephalic/Atraumatic, reactive light reflex, moist mucous membranes  NECK: Supple, no JVD  RESP: Symmetric movement of the chest, clear to auscultation bilaterally, no wheeze  CVS: Difficult to auscultate S1 and S2, no murmur, rubs or gallops  GI: Normal active bowel sounds present, abdomen soft, Rt testicular 1x2cm painful cyst   EXTREMITIES:  2+ edema and Rt leg redness with warmth, no clubbing, cyanosis, LUE fistula   MSK: 4/5 strength bilateral upper and lower extremities   PSYCH: Normal mood, anxious    NEURO: Alert and oriented x 3

## 2019-04-30 NOTE — CONSULT NOTE ADULT - SUBJECTIVE AND OBJECTIVE BOX
39 Male with PMH of Type 2 DM, Migraine, ESRD on HD (left arm graft, M/W/F), Morbid obesity, HTN, Chr dyspnea (uses home O2 prn), Schizophrenia, Bacteremia due to foot cellulitis presented to hospital for shortness of breath and orthopnea after missing dialysis yesterday.  Symptoms are associated with subjective fevers and difficulty ambulation. Yesterday while taking shower he noticed painful Rt testicular nodule. No focal weakness, chest pain, abdominal pain. pt voids, even with HD. Denies hematuria or dysuria. Denies trauma. never before noticed mass. no hx or drainage.   Recurrent admissions in past due to non-compliance with dialysis.     FH: Sister has RA and Lupus, Father had Testicular mass  SH: Smokes, drinks alcohol and uses other drugs on weekends. Lives with family.     A&Ox3 nad  Obese,  Scrotum: 1.5 cm cystic mass inferior aspect of scrotum right side. no erythema. no open lesions. +tenderness. no crepitus.  testis, nt no masses. bilat.  no penile lesions or discharge  Abd: soft nt                        9.3    11.98 )-----------( 385      ( 30 Apr 2019 05:45 )             30.0     04-30    135  |  101  |  89<H>  ----------------------------<  95  6.4<HH>   |  24  |  12.60<H>    Ca    8.7      30 Apr 2019 05:45  Phos  7.9     04-30  Mg     2.7     04-30    TPro  7.6  /  Alb  3.2<L>  /  TBili  0.4  /  DBili  x   /  AST  14  /  ALT  19  /  AlkPhos  93  04-30

## 2019-04-30 NOTE — H&P ADULT - PROBLEM SELECTOR PLAN 1
Orthopnea and exertional dyspnea, 2+ Pedal edema  CXR: Bilateral congestion  Missed Hemodialysis yesterday   (Goes to [facility] via LUE fistula every MWF)  Serum Cr of 12.30  Consulted Dr La for Dialysis in morning as patient does not seem in distress

## 2019-04-30 NOTE — ED PROVIDER NOTE - PHYSICAL EXAMINATION
Morbidly obese. Non-pitting edema to bilateral lower extremities. Minimal erythema to R lower leg. Inferior aspect of scrotum 1.5 cm area of edema, fluctuance. No active drainage. Moderate tenderness to palpation.

## 2019-05-01 ENCOUNTER — TRANSCRIPTION ENCOUNTER (OUTPATIENT)
Age: 39
End: 2019-05-01

## 2019-05-01 ENCOUNTER — APPOINTMENT (OUTPATIENT)
Dept: VASCULAR SURGERY | Facility: CLINIC | Age: 39
End: 2019-05-01

## 2019-05-01 LAB
ALBUMIN SERPL ELPH-MCNC: 2.9 G/DL — LOW (ref 3.5–5)
ALP SERPL-CCNC: 87 U/L — SIGNIFICANT CHANGE UP (ref 40–120)
ALT FLD-CCNC: 18 U/L DA — SIGNIFICANT CHANGE UP (ref 10–60)
ANION GAP SERPL CALC-SCNC: 8 MMOL/L — SIGNIFICANT CHANGE UP (ref 5–17)
AST SERPL-CCNC: 17 U/L — SIGNIFICANT CHANGE UP (ref 10–40)
BILIRUB SERPL-MCNC: 0.4 MG/DL — SIGNIFICANT CHANGE UP (ref 0.2–1.2)
BUN SERPL-MCNC: 59 MG/DL — HIGH (ref 7–18)
C TRACH RRNA SPEC QL NAA+PROBE: SIGNIFICANT CHANGE UP
CALCIUM SERPL-MCNC: 8.8 MG/DL — SIGNIFICANT CHANGE UP (ref 8.4–10.5)
CHLORIDE SERPL-SCNC: 98 MMOL/L — SIGNIFICANT CHANGE UP (ref 96–108)
CO2 SERPL-SCNC: 28 MMOL/L — SIGNIFICANT CHANGE UP (ref 22–31)
CREAT SERPL-MCNC: 10.1 MG/DL — HIGH (ref 0.5–1.3)
CULTURE RESULTS: NO GROWTH — SIGNIFICANT CHANGE UP
GLUCOSE BLDC GLUCOMTR-MCNC: 141 MG/DL — HIGH (ref 70–99)
GLUCOSE BLDC GLUCOMTR-MCNC: 153 MG/DL — HIGH (ref 70–99)
GLUCOSE SERPL-MCNC: 144 MG/DL — HIGH (ref 70–99)
HCT VFR BLD CALC: 30.3 % — LOW (ref 39–50)
HGB BLD-MCNC: 9.5 G/DL — LOW (ref 13–17)
MCHC RBC-ENTMCNC: 30.6 PG — SIGNIFICANT CHANGE UP (ref 27–34)
MCHC RBC-ENTMCNC: 31.4 GM/DL — LOW (ref 32–36)
MCV RBC AUTO: 97.7 FL — SIGNIFICANT CHANGE UP (ref 80–100)
N GONORRHOEA RRNA SPEC QL NAA+PROBE: SIGNIFICANT CHANGE UP
NRBC # BLD: 0 /100 WBCS — SIGNIFICANT CHANGE UP (ref 0–0)
PLATELET # BLD AUTO: 285 K/UL — SIGNIFICANT CHANGE UP (ref 150–400)
POTASSIUM SERPL-MCNC: 5 MMOL/L — SIGNIFICANT CHANGE UP (ref 3.5–5.3)
POTASSIUM SERPL-SCNC: 5 MMOL/L — SIGNIFICANT CHANGE UP (ref 3.5–5.3)
PROT SERPL-MCNC: 7.2 G/DL — SIGNIFICANT CHANGE UP (ref 6–8.3)
RBC # BLD: 3.1 M/UL — LOW (ref 4.2–5.8)
RBC # FLD: 15.2 % — HIGH (ref 10.3–14.5)
SODIUM SERPL-SCNC: 134 MMOL/L — LOW (ref 135–145)
SPECIMEN SOURCE: SIGNIFICANT CHANGE UP
SPECIMEN SOURCE: SIGNIFICANT CHANGE UP
WBC # BLD: 7.64 K/UL — SIGNIFICANT CHANGE UP (ref 3.8–10.5)
WBC # FLD AUTO: 7.64 K/UL — SIGNIFICANT CHANGE UP (ref 3.8–10.5)

## 2019-05-01 RX ORDER — CEFTRIAXONE 500 MG/1
1 INJECTION, POWDER, FOR SOLUTION INTRAMUSCULAR; INTRAVENOUS ONCE
Qty: 0 | Refills: 0 | Status: COMPLETED | OUTPATIENT
Start: 2019-05-01 | End: 2019-05-01

## 2019-05-01 RX ORDER — ERYTHROPOIETIN 10000 [IU]/ML
4000 INJECTION, SOLUTION INTRAVENOUS; SUBCUTANEOUS
Qty: 0 | Refills: 0 | Status: DISCONTINUED | OUTPATIENT
Start: 2019-05-01 | End: 2019-05-02

## 2019-05-01 RX ORDER — CEFTRIAXONE 500 MG/1
1 INJECTION, POWDER, FOR SOLUTION INTRAMUSCULAR; INTRAVENOUS EVERY 24 HOURS
Qty: 0 | Refills: 0 | Status: DISCONTINUED | OUTPATIENT
Start: 2019-05-02 | End: 2019-05-03

## 2019-05-01 RX ORDER — CEFTRIAXONE 500 MG/1
INJECTION, POWDER, FOR SOLUTION INTRAMUSCULAR; INTRAVENOUS
Qty: 0 | Refills: 0 | Status: DISCONTINUED | OUTPATIENT
Start: 2019-05-01 | End: 2019-05-03

## 2019-05-01 RX ADMIN — INSULIN GLARGINE 8 UNIT(S): 100 INJECTION, SOLUTION SUBCUTANEOUS at 22:10

## 2019-05-01 RX ADMIN — QUETIAPINE FUMARATE 200 MILLIGRAM(S): 200 TABLET, FILM COATED ORAL at 14:36

## 2019-05-01 RX ADMIN — HEPARIN SODIUM 5000 UNIT(S): 5000 INJECTION INTRAVENOUS; SUBCUTANEOUS at 22:11

## 2019-05-01 RX ADMIN — Medication 80 MILLIGRAM(S): at 06:26

## 2019-05-01 RX ADMIN — PANTOPRAZOLE SODIUM 40 MILLIGRAM(S): 20 TABLET, DELAYED RELEASE ORAL at 06:27

## 2019-05-01 RX ADMIN — Medication 10 MILLIGRAM(S): at 22:11

## 2019-05-01 RX ADMIN — SIMVASTATIN 40 MILLIGRAM(S): 20 TABLET, FILM COATED ORAL at 22:11

## 2019-05-01 RX ADMIN — HEPARIN SODIUM 5000 UNIT(S): 5000 INJECTION INTRAVENOUS; SUBCUTANEOUS at 06:27

## 2019-05-01 RX ADMIN — HEPARIN SODIUM 5000 UNIT(S): 5000 INJECTION INTRAVENOUS; SUBCUTANEOUS at 14:36

## 2019-05-01 RX ADMIN — INSULIN GLARGINE 8 UNIT(S): 100 INJECTION, SOLUTION SUBCUTANEOUS at 01:10

## 2019-05-01 RX ADMIN — CEFTRIAXONE 100 GRAM(S): 500 INJECTION, POWDER, FOR SOLUTION INTRAMUSCULAR; INTRAVENOUS at 17:28

## 2019-05-01 RX ADMIN — Medication 10 MILLIGRAM(S): at 06:26

## 2019-05-01 RX ADMIN — Medication 1: at 22:11

## 2019-05-01 RX ADMIN — CLOPIDOGREL BISULFATE 75 MILLIGRAM(S): 75 TABLET, FILM COATED ORAL at 14:36

## 2019-05-01 RX ADMIN — Medication 10 MILLIGRAM(S): at 14:36

## 2019-05-01 NOTE — PROGRESS NOTE ADULT - SUBJECTIVE AND OBJECTIVE BOX
Pt s- new complaints  ICU Vital Signs Last 24 Hrs  T(C): 36.6 (01 May 2019 16:00), Max: 36.9 (01 May 2019 06:05)  T(F): 97.9 (01 May 2019 16:00), Max: 98.4 (01 May 2019 06:05)  HR: 94 (01 May 2019 16:00) (90 - 94)  BP: 165/89 (01 May 2019 16:00) (133/77 - 165/89)  BP(mean): --  ABP: --  ABP(mean): --  RR: 18 (01 May 2019 16:00) (17 - 18)  SpO2: 100% (01 May 2019 16:00) (98% - 100%)  Scrotum: inferior aspect: cyst slightly smaller, no gross purulence. +tender, no crepitus. mild erythema                          9.5    7.64  )-----------( 285      ( 01 May 2019 06:20 )             30.3     05-01    134<L>  |  98  |  59<H>  ----------------------------<  144<H>  5.0   |  28  |  10.10<H>    Ca    8.8      01 May 2019 06:20  Phos  7.9     04-30  Mg     2.7     04-30    TPro  7.2  /  Alb  2.9<L>  /  TBili  0.4  /  DBili  x   /  AST  17  /  ALT  18  /  AlkPhos  87  05-01

## 2019-05-01 NOTE — PROGRESS NOTE ADULT - SUBJECTIVE AND OBJECTIVE BOX
[facility] : Progress Note :: [phone], (office [phone]),   Dr La / Dr Hui / Dr Barber / Dr Villalobos / Dr Hang CASTELLANO / Dr Mehta / Dr Sanchez / Dr Alber dumont  _____________________________________________________________________________________________    s/p HD yesterday.    aspirin (Short breath)  aspirin (Swelling)  penicillin (Short breath)  penicillins (Swelling)  shellfish (Unknown)    Hospital Medications:   MEDICATIONS  (STANDING):  clopidogrel Tablet 75 milliGRAM(s) Oral daily  furosemide    Tablet 80 milliGRAM(s) Oral daily  heparin  Injectable 5000 Unit(s) SubCutaneous every 8 hours  hydrALAZINE 10 milliGRAM(s) Oral three times a day  insulin glargine Injectable (LANTUS) 8 Unit(s) SubCutaneous at bedtime  insulin lispro (HumaLOG) corrective regimen sliding scale   SubCutaneous Before meals and at bedtime  pantoprazole    Tablet 40 milliGRAM(s) Oral before breakfast  QUEtiapine 200 milliGRAM(s) Oral daily  simvastatin 40 milliGRAM(s) Oral at bedtime        VITALS:  T(F): 97.9 (19 @ 08:04), Max: 98.4 (19 @ 06:05)  HR: 90 (19 @ 08:04)  BP: 135/70 (19 @ 08:04)  RR: 18 (19 @ 08:04)  SpO2: 100% (19 @ 08:04)  Wt(kg): --      PHYSICAL EXAM:  Constitutional: NAD  HEENT: anicteric sclera, oropharynx clear.  Neck: No JVD  Respiratory: CTAB, no wheezes, rales or rhonchi  Cardiovascular: S1, S2, RRR  Gastrointestinal: BS+, soft, NT/ND  Extremities:  bilateral  peripheral edema++  Neurological: A/O x 3, no focal deficits  Vascular Access: LUEAVF with thrill and bruit    LABS:      134<L>  |  98  |  59<H>  ----------------------------<  144<H>  5.0   |  28  |  10.10<H>    Ca    8.8      01 May 2019 06:20  Phos  7.9     04-30  Mg     2.7     -30    TPro  7.2  /  Alb  2.9<L>  /  TBili  0.4  /  DBili      /  AST  17  /  ALT  18  /  AlkPhos  87      Creatinine Trend: 10.10 <--, 12.60 <--, 12.30 <--, 11.20 <--                        9.5    7.64  )-----------( 285      ( 01 May 2019 06:20 )             30.3     Urine Studies:  Urinalysis Basic - ( 2019 04:26 )    Color: Yellow / Appearance: Clear / S.010 / pH:   Gluc:  / Ketone: Negative  / Bili: Negative / Urobili: Negative   Blood:  / Protein: 500 mg/dL / Nitrite: Negative   Leuk Esterase: Negative / RBC: 2-5 /HPF / WBC 0-2 /HPF   Sq Epi:  / Non Sq Epi:  / Bacteria:         RADIOLOGY & ADDITIONAL STUDIES:

## 2019-05-01 NOTE — PROGRESS NOTE ADULT - ASSESSMENT
39 year old male with scrotal abscess    - OR 5/2 for debridement of scrotal abscess  - NPO after midnight  - preop labs pending  - medical optimization

## 2019-05-01 NOTE — PROGRESS NOTE ADULT - SUBJECTIVE AND OBJECTIVE BOX
Surgery Pre-op Note    Preoperative Diagnosis: scrotal abscess    Planned Procedure: incision and drainage of scrotal abscess                        9.5    7.64  )-----------( 285      ( 01 May 2019 06:20 )             30.3     05-01    134<L>  |  98  |  59<H>  ----------------------------<  144<H>  5.0   |  28  |  10.10<H>    Ca    8.8      01 May 2019 06:20  Phos  7.9     04-30  Mg     2.7     04-30    TPro  7.2  /  Alb  2.9<L>  /  TBili  0.4  /  DBili  x   /  AST  17  /  ALT  18  /  AlkPhos  87  05-01    LIVER FUNCTIONS - ( 01 May 2019 06:20 )  Alb: 2.9 g/dL / Pro: 7.2 g/dL / ALK PHOS: 87 U/L / ALT: 18 U/L DA / AST: 17 U/L / GGT: x           PT/INR - ( 30 Apr 2019 01:48 )   PT: 11.1 sec;   INR: 1.00 ratio       PTT - ( 30 Apr 2019 01:48 )  PTT:38.2 sec    Type & Screen: ABO RH Interpretation: A NEG (12.28.18 @ 18:57)    EKG: < from: 12 Lead ECG (04.30.19 @ 01:45) >    Diagnosis Line Normal sinus rhythm  Left anterior fascicular block  Nonspecific T wave abnormality  Abnormal ECG    < end of copied text >    Echo: < from: Transthoracic Echocardiogram (03.04.19 @ 09:53) >  CONCLUSIONS:  1. Normal mitral valve.  2. Normal trileaflet aortic valve.  3. Aortic Root: 3.7 cm.  4. Normal left atrium.  LA volume index = 28 cc/m2.  5. Moderate concentric left ventricular hypertrophy.  6. Normal Left Ventricular Systolic Function,  (EF = 55 to  60%)  7. Normal diastolic function.  8. Normal right atrium.  9. Normal right ventricular size and function.  10. Unable to estimate RVSP.  11. There is mild tricuspid regurgitation.  12. Normal pulmonic valve.  13. Normal pericardium with no pericardial effusion.    ------------------------------------------------------------------------  Confirmed on  3/5/2019 - 07:30:16 by Rachel Bob MD    < end of copied text >    CXR: < from: Xray Chest 1 View-PORTABLE IMMEDIATE (04.30.19 @ 01:39) >  Impression: The examination is limited by patient's large body habitus.   No gross stable pulmonary vascular congestion; underlying pulmonary   infiltrates/pneumonia cannot be excluded.    No evidence for pleural effusion or pneumothorax.    The trachea is midline.    Stable cardiac silhouette.    < end of copied text >

## 2019-05-01 NOTE — PROGRESS NOTE ADULT - ASSESSMENT
seen and examined  no complaints except hung[place]  no cp or sob no scrotal pain  had some drain for scrotum  seen by urologist ,  may go for mild surgery to drain.  pt denies cp or sob or palp.    physical unchanged  lungs clear b/l legs minimal redness but no tenderness. not increased warmth  vascular   cultures from scrotum sent.  blood cxs negat  dm, morbid obesity, htn esrd on hd cont same

## 2019-05-01 NOTE — PROGRESS NOTE ADULT - ASSESSMENT
# ESRD. s/p HD yesterday. HD in AM  # hypervolemia. asked control fluid and salt intake. UF on HD   # anemia of CKD procrit on HD  # scrotal mass ? abcess. following with urology

## 2019-05-01 NOTE — PROGRESS NOTE ADULT - SUBJECTIVE AND OBJECTIVE BOX
HPI:  39 Male with PMH of Type 2 DM, Migraine, ESRD on HD (left arm graft, M/W/F), Morbid obesity, HTN, Chr dyspnea (uses home O2 prn), Schizophrenia, Bacteremia due to foot cellulitis presented to hospital for shortness of breath and orthopnea after missing dialysis yesterday when he overslept. Pt feels like drowning in water mercado he lays flat. Symptoms are associated with subjective fevers and difficulty ambulation. Yesterday while taking shower he noticed painful Rt testicular nodule. No focal weakness, chest pain, abdominal pain. Despite being on dialysis, he still makes urine. Recurrent admissions in past due to non-compliance with dialysis.     FH: Sister has RA and Lupus, Father had Testicular mass  SH: Smokes, drinks alcohol and uses other drugs on weekends. Lives with family.     ED Course: Hemodynamically stable. Labs showed anemia and Hyperkalemia of 5.9. Serum Cr was 12.30. [place] of [address] and CXR showed bilateral congestion. Pt was given insulin, dextrose, calcium and Kayexalate. (2019 03:06)      Patient is a 39y old  Male who presents with a chief complaint of Shortness of breath and leg swelling (01 May 2019 14:42)      INTERVAL HPI/OVERNIGHT EVENTS:  T(C): 36.6 (19 @ 08:04), Max: 36.9 (19 @ 06:05)  HR: 90 (19 @ 08:04) (90 - 90)  BP: 135/70 (19 @ 08:04) (133/77 - 139/80)  RR: 18 (19 @ 08:04) (17 - 18)  SpO2: 100% (19 @ 08:04) (98% - 100%)  Wt(kg): --  I&O's Summary      REVIEW OF SYSTEMS: denies fever, chills, SOB, palpitations, chest pain, abdominal pain, nausea, vomitting, diarrhea, constipation, dizziness    MEDICATIONS  (STANDING):  clopidogrel Tablet 75 milliGRAM(s) Oral daily  furosemide    Tablet 80 milliGRAM(s) Oral daily  heparin  Injectable 5000 Unit(s) SubCutaneous every 8 hours  hydrALAZINE 10 milliGRAM(s) Oral three times a day  insulin glargine Injectable (LANTUS) 8 Unit(s) SubCutaneous at bedtime  insulin lispro (HumaLOG) corrective regimen sliding scale   SubCutaneous Before meals and at bedtime  pantoprazole    Tablet 40 milliGRAM(s) Oral before breakfast  QUEtiapine 200 milliGRAM(s) Oral daily  simvastatin 40 milliGRAM(s) Oral at bedtime    MEDICATIONS  (PRN):      PHYSICAL EXAM:  GENERAL: NAD, well-groomed, well-developed  HEAD:  Atraumatic, Normocephalic  EYES: EOMI, PERRLA, conjunctiva and sclera clear  ENMT: No tonsillar erythema, exudates, or enlargement; Moist mucous membranes, Good dentition, No lesions  NECK: Supple, No JVD, Normal thyroid  NERVOUS SYSTEM:  Alert & Oriented X3, Good concentration; Motor Strength 5/5 B/L upper and lower extremities; DTRs 2+ intact and symmetric  CHEST/LUNG: Clear to percussion bilaterally; No rales, rhonchi, wheezing, or rubs  HEART: Regular rate and rhythm; No murmurs, rubs, or gallops  ABDOMEN: Soft, Nontender, Nondistended; Bowel sounds present  EXTREMITIES:  2+ Peripheral Pulses, No clubbing, cyanosis, or edema  LYMPH: No lymphadenopathy noted  SKIN: No rashes or lesions  LABS:                        9.5    7.64  )-----------( 285      ( 01 May 2019 06:20 )             30.3     05-    134<L>  |  98  |  59<H>  ----------------------------<  144<H>  5.0   |  28  |  10.10<H>    Ca    8.8      01 May 2019 06:20  Phos  7.9     04-30  Mg     2.7     04-30    TPro  7.2  /  Alb  2.9<L>  /  TBili  0.4  /  DBili  x   /  AST  17  /  ALT  18  /  AlkPhos  87  05-    PT/INR - ( 2019 01:48 )   PT: 11.1 sec;   INR: 1.00 ratio         PTT - ( 2019 01:48 )  PTT:38.2 sec  Urinalysis Basic - ( 2019 04:26 )    Color: Yellow / Appearance: Clear / S.010 / pH: x  Gluc: x / Ketone: Negative  / Bili: Negative / Urobili: Negative   Blood: x / Protein: 500 mg/dL / Nitrite: Negative   Leuk Esterase: Negative / RBC: 2-5 /HPF / WBC 0-2 /HPF   Sq Epi: x / Non Sq Epi: x / Bacteria: x      CAPILLARY BLOOD GLUCOSE      POCT Blood Glucose.: 102 mg/dL (2019 22:36)  POCT Blood Glucose.: 89 mg/dL (2019 17:47)        Urinalysis Basic - ( 2019 04:26 )    Color: Yellow / Appearance: Clear / S.010 / pH: x  Gluc: x / Ketone: Negative  / Bili: Negative / Urobili: Negative   Blood: x / Protein: 500 mg/dL / Nitrite: Negative   Leuk Esterase: Negative / RBC: 2-5 /HPF / WBC 0-2 /HPF   Sq Epi: x / Non Sq Epi: x / Bacteria: x

## 2019-05-01 NOTE — PROGRESS NOTE ADULT - ASSESSMENT
scrotal cyst r/o infected sebaceous cyst.  pt on abx  observation  npo after midnight  please indicate medical optimization for planned OR debridement of scrotal infected cyst in am.  If wound appearance continues to improve Urologist may opt for more conservative tratment, however, plan is for debridement in am 5-2-19

## 2019-05-02 ENCOUNTER — RESULT REVIEW (OUTPATIENT)
Age: 39
End: 2019-05-02

## 2019-05-02 LAB
ALBUMIN SERPL ELPH-MCNC: 2.9 G/DL — LOW (ref 3.5–5)
ALP SERPL-CCNC: 86 U/L — SIGNIFICANT CHANGE UP (ref 40–120)
ALT FLD-CCNC: 18 U/L DA — SIGNIFICANT CHANGE UP (ref 10–60)
ANION GAP SERPL CALC-SCNC: 10 MMOL/L — SIGNIFICANT CHANGE UP (ref 5–17)
ANION GAP SERPL CALC-SCNC: 11 MMOL/L — SIGNIFICANT CHANGE UP (ref 5–17)
APTT BLD: 35.9 SEC — SIGNIFICANT CHANGE UP (ref 27.5–36.3)
AST SERPL-CCNC: 10 U/L — SIGNIFICANT CHANGE UP (ref 10–40)
BILIRUB SERPL-MCNC: 0.3 MG/DL — SIGNIFICANT CHANGE UP (ref 0.2–1.2)
BUN SERPL-MCNC: 64 MG/DL — HIGH (ref 7–18)
BUN SERPL-MCNC: 67 MG/DL — HIGH (ref 7–18)
CALCIUM SERPL-MCNC: 8 MG/DL — LOW (ref 8.4–10.5)
CALCIUM SERPL-MCNC: 8 MG/DL — LOW (ref 8.4–10.5)
CHLORIDE SERPL-SCNC: 101 MMOL/L — SIGNIFICANT CHANGE UP (ref 96–108)
CHLORIDE SERPL-SCNC: 102 MMOL/L — SIGNIFICANT CHANGE UP (ref 96–108)
CK MB BLD-MCNC: 1.5 % — SIGNIFICANT CHANGE UP (ref 0–3.5)
CK MB CFR SERPL CALC: 1.2 NG/ML — SIGNIFICANT CHANGE UP (ref 0–3.6)
CK SERPL-CCNC: 82 U/L — SIGNIFICANT CHANGE UP (ref 35–232)
CO2 SERPL-SCNC: 24 MMOL/L — SIGNIFICANT CHANGE UP (ref 22–31)
CO2 SERPL-SCNC: 25 MMOL/L — SIGNIFICANT CHANGE UP (ref 22–31)
CREAT SERPL-MCNC: 11.4 MG/DL — HIGH (ref 0.5–1.3)
CREAT SERPL-MCNC: 11.9 MG/DL — HIGH (ref 0.5–1.3)
GLUCOSE BLDC GLUCOMTR-MCNC: 110 MG/DL — HIGH (ref 70–99)
GLUCOSE BLDC GLUCOMTR-MCNC: 117 MG/DL — HIGH (ref 70–99)
GLUCOSE BLDC GLUCOMTR-MCNC: 120 MG/DL — HIGH (ref 70–99)
GLUCOSE BLDC GLUCOMTR-MCNC: 130 MG/DL — HIGH (ref 70–99)
GLUCOSE BLDC GLUCOMTR-MCNC: 145 MG/DL — HIGH (ref 70–99)
GLUCOSE SERPL-MCNC: 107 MG/DL — HIGH (ref 70–99)
GLUCOSE SERPL-MCNC: 99 MG/DL — SIGNIFICANT CHANGE UP (ref 70–99)
HCT VFR BLD CALC: 29.7 % — LOW (ref 39–50)
HCT VFR BLD CALC: 29.9 % — LOW (ref 39–50)
HGB BLD-MCNC: 9.2 G/DL — LOW (ref 13–17)
HGB BLD-MCNC: 9.4 G/DL — LOW (ref 13–17)
INR BLD: 1.01 RATIO — SIGNIFICANT CHANGE UP (ref 0.88–1.16)
MAGNESIUM SERPL-MCNC: 2.5 MG/DL — SIGNIFICANT CHANGE UP (ref 1.6–2.6)
MCHC RBC-ENTMCNC: 30.4 PG — SIGNIFICANT CHANGE UP (ref 27–34)
MCHC RBC-ENTMCNC: 30.5 PG — SIGNIFICANT CHANGE UP (ref 27–34)
MCHC RBC-ENTMCNC: 31 GM/DL — LOW (ref 32–36)
MCHC RBC-ENTMCNC: 31.4 GM/DL — LOW (ref 32–36)
MCV RBC AUTO: 96.8 FL — SIGNIFICANT CHANGE UP (ref 80–100)
MCV RBC AUTO: 98.3 FL — SIGNIFICANT CHANGE UP (ref 80–100)
NRBC # BLD: 0 /100 WBCS — SIGNIFICANT CHANGE UP (ref 0–0)
NRBC # BLD: 0 /100 WBCS — SIGNIFICANT CHANGE UP (ref 0–0)
PLATELET # BLD AUTO: 311 K/UL — SIGNIFICANT CHANGE UP (ref 150–400)
PLATELET # BLD AUTO: 318 K/UL — SIGNIFICANT CHANGE UP (ref 150–400)
POTASSIUM SERPL-MCNC: 5.3 MMOL/L — SIGNIFICANT CHANGE UP (ref 3.5–5.3)
POTASSIUM SERPL-MCNC: 6.5 MMOL/L — CRITICAL HIGH (ref 3.5–5.3)
POTASSIUM SERPL-SCNC: 5.3 MMOL/L — SIGNIFICANT CHANGE UP (ref 3.5–5.3)
POTASSIUM SERPL-SCNC: 6.5 MMOL/L — CRITICAL HIGH (ref 3.5–5.3)
PROT SERPL-MCNC: 7.1 G/DL — SIGNIFICANT CHANGE UP (ref 6–8.3)
PROTHROM AB SERPL-ACNC: 11.2 SEC — SIGNIFICANT CHANGE UP (ref 10–12.9)
RBC # BLD: 3.02 M/UL — LOW (ref 4.2–5.8)
RBC # BLD: 3.09 M/UL — LOW (ref 4.2–5.8)
RBC # FLD: 15.2 % — HIGH (ref 10.3–14.5)
RBC # FLD: 15.5 % — HIGH (ref 10.3–14.5)
SODIUM SERPL-SCNC: 136 MMOL/L — SIGNIFICANT CHANGE UP (ref 135–145)
SODIUM SERPL-SCNC: 137 MMOL/L — SIGNIFICANT CHANGE UP (ref 135–145)
TROPONIN I SERPL-MCNC: 0.03 NG/ML — SIGNIFICANT CHANGE UP (ref 0–0.04)
WBC # BLD: 7.49 K/UL — SIGNIFICANT CHANGE UP (ref 3.8–10.5)
WBC # BLD: 7.9 K/UL — SIGNIFICANT CHANGE UP (ref 3.8–10.5)
WBC # FLD AUTO: 7.49 K/UL — SIGNIFICANT CHANGE UP (ref 3.8–10.5)
WBC # FLD AUTO: 7.9 K/UL — SIGNIFICANT CHANGE UP (ref 3.8–10.5)

## 2019-05-02 PROCEDURE — ZZZZZ: CPT

## 2019-05-02 PROCEDURE — 88305 TISSUE EXAM BY PATHOLOGIST: CPT | Mod: 26

## 2019-05-02 RX ORDER — HYDROMORPHONE HYDROCHLORIDE 2 MG/ML
1 INJECTION INTRAMUSCULAR; INTRAVENOUS; SUBCUTANEOUS
Qty: 0 | Refills: 0 | Status: DISCONTINUED | OUTPATIENT
Start: 2019-05-02 | End: 2019-05-02

## 2019-05-02 RX ORDER — OXYCODONE AND ACETAMINOPHEN 5; 325 MG/1; MG/1
1 TABLET ORAL EVERY 4 HOURS
Qty: 0 | Refills: 0 | Status: DISCONTINUED | OUTPATIENT
Start: 2019-05-02 | End: 2019-05-03

## 2019-05-02 RX ORDER — ONDANSETRON 8 MG/1
4 TABLET, FILM COATED ORAL EVERY 6 HOURS
Qty: 0 | Refills: 0 | Status: DISCONTINUED | OUTPATIENT
Start: 2019-05-02 | End: 2019-05-03

## 2019-05-02 RX ORDER — ACETAMINOPHEN 500 MG
1000 TABLET ORAL ONCE
Qty: 0 | Refills: 0 | Status: DISCONTINUED | OUTPATIENT
Start: 2019-05-02 | End: 2019-05-03

## 2019-05-02 RX ORDER — CHLORHEXIDINE GLUCONATE 213 G/1000ML
1 SOLUTION TOPICAL
Qty: 0 | Refills: 0 | Status: DISCONTINUED | OUTPATIENT
Start: 2019-05-02 | End: 2019-05-03

## 2019-05-02 RX ORDER — ACETAMINOPHEN 500 MG
650 TABLET ORAL EVERY 6 HOURS
Qty: 0 | Refills: 0 | Status: DISCONTINUED | OUTPATIENT
Start: 2019-05-02 | End: 2019-05-03

## 2019-05-02 RX ORDER — ACETAMINOPHEN 500 MG
1000 TABLET ORAL ONCE
Qty: 0 | Refills: 0 | Status: COMPLETED | OUTPATIENT
Start: 2019-05-02 | End: 2019-05-02

## 2019-05-02 RX ORDER — HYDROMORPHONE HYDROCHLORIDE 2 MG/ML
0.5 INJECTION INTRAMUSCULAR; INTRAVENOUS; SUBCUTANEOUS
Qty: 0 | Refills: 0 | Status: DISCONTINUED | OUTPATIENT
Start: 2019-05-02 | End: 2019-05-02

## 2019-05-02 RX ADMIN — HYDROMORPHONE HYDROCHLORIDE 0.5 MILLIGRAM(S): 2 INJECTION INTRAMUSCULAR; INTRAVENOUS; SUBCUTANEOUS at 12:53

## 2019-05-02 RX ADMIN — HYDROMORPHONE HYDROCHLORIDE 0.5 MILLIGRAM(S): 2 INJECTION INTRAMUSCULAR; INTRAVENOUS; SUBCUTANEOUS at 13:23

## 2019-05-02 RX ADMIN — INSULIN GLARGINE 8 UNIT(S): 100 INJECTION, SOLUTION SUBCUTANEOUS at 22:22

## 2019-05-02 RX ADMIN — PANTOPRAZOLE SODIUM 40 MILLIGRAM(S): 20 TABLET, DELAYED RELEASE ORAL at 06:15

## 2019-05-02 RX ADMIN — SIMVASTATIN 40 MILLIGRAM(S): 20 TABLET, FILM COATED ORAL at 22:21

## 2019-05-02 RX ADMIN — Medication 10 MILLIGRAM(S): at 06:15

## 2019-05-02 RX ADMIN — HYDROMORPHONE HYDROCHLORIDE 1 MILLIGRAM(S): 2 INJECTION INTRAMUSCULAR; INTRAVENOUS; SUBCUTANEOUS at 12:53

## 2019-05-02 RX ADMIN — OXYCODONE AND ACETAMINOPHEN 1 TABLET(S): 5; 325 TABLET ORAL at 23:33

## 2019-05-02 RX ADMIN — HYDROMORPHONE HYDROCHLORIDE 0.5 MILLIGRAM(S): 2 INJECTION INTRAMUSCULAR; INTRAVENOUS; SUBCUTANEOUS at 13:08

## 2019-05-02 RX ADMIN — HYDROMORPHONE HYDROCHLORIDE 1 MILLIGRAM(S): 2 INJECTION INTRAMUSCULAR; INTRAVENOUS; SUBCUTANEOUS at 12:38

## 2019-05-02 RX ADMIN — Medication 10 MILLIGRAM(S): at 22:21

## 2019-05-02 RX ADMIN — HEPARIN SODIUM 5000 UNIT(S): 5000 INJECTION INTRAVENOUS; SUBCUTANEOUS at 06:15

## 2019-05-02 RX ADMIN — OXYCODONE AND ACETAMINOPHEN 1 TABLET(S): 5; 325 TABLET ORAL at 23:16

## 2019-05-02 RX ADMIN — Medication 400 MILLIGRAM(S): at 02:09

## 2019-05-02 RX ADMIN — Medication 1000 MILLIGRAM(S): at 02:40

## 2019-05-02 RX ADMIN — Medication 80 MILLIGRAM(S): at 06:15

## 2019-05-02 RX ADMIN — HEPARIN SODIUM 5000 UNIT(S): 5000 INJECTION INTRAVENOUS; SUBCUTANEOUS at 22:22

## 2019-05-02 NOTE — CONSULT NOTE ADULT - PROBLEM SELECTOR RECOMMENDATION 8
Improve VTE score: 3 (Heparin sq)  [ ] Previous VTE                                               3  [ ] Thrombophilia                                             2  [] Lower limb paralysis                                   2    [ ] Current Cancer                                           2   [x] Immobilization > 24 hrs                              1  [ ] ICU/CCU stay > 24 hours                            1  [ ] Age > 60                                                       1.     IMPROVE- 1  heparin

## 2019-05-02 NOTE — CONSULT NOTE ADULT - ATTENDING COMMENTS
Patient seen and examined with resident, Addendum to above.    38 y/o male seen in PACU post I&D of scrotal abscess. Consulted for ~30 sec of wide complex tachycardia. Patient remained asymptomatic with out any hemodynamic instability. Likely related to electrolyte abnormalities, given not on HD today. He is awake and alert not in distress. Denies any chest pain or shortness of breath. Has been told to get screened for sleep apnea, but pending evaluation by pulmonary. Discussed with cardiology, agree with admission to telemetry for closer observation. Patient to go to HD post PACU.

## 2019-05-02 NOTE — PROGRESS NOTE ADULT - SUBJECTIVE AND OBJECTIVE BOX
Urology    Subjective:  Pt resting comfortably. No acute complaints.  Currently receiving dialysis treatment.  Tolerating mild pain with meds.  Denies CP, SOB.    T(C): 36.7 (05-02-19 @ 14:53), Max: 37.1 (05-02-19 @ 05:02)  HR: 88 (05-02-19 @ 14:53) (81 - 95)  BP: 133/85 (05-02-19 @ 14:53) (115/80 - 172/77)  RR: 15 (05-02-19 @ 14:53) (12 - 20)  SpO2: 99% (05-02-19 @ 14:53) (97% - 100%)    Physical:  Gen: A&O x3. NAD  Pelvis: Dressing C/D/I

## 2019-05-02 NOTE — CHART NOTE - NSCHARTNOTEFT_GEN_A_CORE
called to RR for pt with non sustained Vtach approximately 30 seconds. At bedside pt in sinus rhythm. denies CP or SOB. /89 hr 84 Sat 100% on 3ln.c.  Pt asymptomatic. Will order cbc bmp troponin. Zoll pads placed on patient. Spoke with surgeon regarding icu consult. Surgeon to call ICU consult.

## 2019-05-02 NOTE — PROGRESS NOTE ADULT - ASSESSMENT
# ESRD. mild hyperkalemia with non-sustained V-TACH post-op. hemodialysis today.  # hypervolemia. asked control fluid and salt intake. UF on HD   # anemia of CKD procrit on HD  # scrotal mass/ abcess. s/p surgery by  urology

## 2019-05-02 NOTE — PROGRESS NOTE ADULT - ASSESSMENT
came to see pt  pt went for scrotal abceess drain    chart reviwed, d/w resident, pt is doing ok.  wi;ll follow.

## 2019-05-02 NOTE — CONSULT NOTE ADULT - SUBJECTIVE AND OBJECTIVE BOX
HPI:  39 Male with PMH of Type 2 DM, Migraine, ESRD on HD (left arm graft, M/W/F), Morbid obesity, HTN, Chr dyspnea (uses home O2 prn), Schizophrenia, Bacteremia due to foot cellulitis presented to hospital for shortness of breath and orthopnea after missing dialysis yesterday when he overslept. Pt feels like drowning in water merc[place] he lays flat. Symptoms are associated with subjective fevers and difficulty ambulation. Yesterday while taking shower he noticed painful Rt testicular nodule. No focal weakness, chest pain, abdominal pain. Despite being on dialysis, he still makes urine. Recurrent admissions in past due to non-compliance with dialysis.     FH: Sister has RA and Lupus, Father had Testicular mass  SH: Smokes, drinks alcohol and uses other drugs on weekends. Lives with family.     ED Course: Hemodynamically stable. Labs showed anemia and Hyperkalemia of 5.9. Serum Cr was 12.30. [place] of [address] and CXR showed bilateral congestion. Pt was given insulin, dextrose, calcium and Kayexalate. (30 Apr 2019 03:06)    INTERVAL HISTORY: Patient was taken to OR today 05/02 for incision and drainage of scrotal abscess. In the PACU he was noticed to have ~30 sec episode of NSVT. Patient was asymptomatic during the episode patient's BP was 139/89, HR 84, and Oxygen Saturation of 100% on 3L NC. ICU was consulted for closer monitoring. Patient denies any complaints of chest pain, palpitations, or SOB. He denies any prior episode. He does complaint of orthopnea and b/l LE swelling x 1 month. He had an ECHO on 03/04/19 with EF 55-65% and normal diastolic function. He is also scheduled for sleep study at Dr. Thurston's office on 05/08. Currently he is not on O2/BiPAP at home. Denies any other complaints at this time.     Dr. Bob on bedside evaluated the patient, and reviewed the telemetry with the ICU team. As per Dr. Bob, patient was noted to have WCT, and review of telemetry shows that WCT appears to ne an artifact. EKG shows normal sinus rythm, HR: 82, QRS 82ms, QTc-469. Patient will be admitted to telemetry for closer monitoring.     PAST MEDICAL & SURGICAL HISTORY:  Migraine  HTN (hypertension)  DM (diabetes mellitus)  Bipolar disorder  Schizophrenia  ESRD on dialysis  Morbid obesity with BMI of 40.0-44.9, adult  H/O hernia repair      ALLERGIES: aspirin (Short breath)  aspirin (Swelling)  penicillin (Short breath)  penicillins (Swelling)  shellfish (Unknown)    REVIEW OF SYSTEMS:  CONSTITUTIONAL: No fever, weight loss, or fatigue  EYES: No eye pain, visual disturbances, or discharge  ENMT:  No difficulty hearing, tinnitus, vertigo; No sinus or throat pain  NECK: No pain or stiffness  BREASTS: No pain, masses, or nipple discharge  RESPIRATORY: No cough, wheezing, chills or hemoptysis; No shortness of breath  CARDIOVASCULAR:  orthopnea, b/l leg swelling  GASTROINTESTINAL: No abdominal or epigastric pain. No nausea, vomiting, or hematemesis; No diarrhea or constipation. No melena or hematochezia.  GENITOURINARY: No dysuria, frequency, hematuria, or incontinence  NEUROLOGICAL: No headaches, memory loss, loss of strength, numbness, or tremors  SKIN: No itching, burning, rashes, or lesions   LYMPH NODES: No enlarged glands  ENDOCRINE: No heat or cold intolerance; No hair loss  MUSCULOSKELETAL: No joint pain or swelling; No muscle, back, or extremity pain  PSYCHIATRIC: No depression, anxiety, mood swings, or difficulty sleeping        MEDS:  acetaminophen   Tablet .. 650 milliGRAM(s) Oral every 6 hours PRN  cefTRIAXone   IVPB      cefTRIAXone   IVPB 1 Gram(s) IV Intermittent every 24 hours  chlorhexidine 4% Liquid 1 Application(s) Topical two times a day  clopidogrel Tablet 75 milliGRAM(s) Oral daily  furosemide    Tablet 80 milliGRAM(s) Oral daily  heparin  Injectable 5000 Unit(s) SubCutaneous every 8 hours  hydrALAZINE 10 milliGRAM(s) Oral three times a day  HYDROmorphone  Injectable 0.5 milliGRAM(s) IV Push every 10 minutes PRN  HYDROmorphone  Injectable 1 milliGRAM(s) IV Push every 10 minutes PRN  insulin glargine Injectable (LANTUS) 8 Unit(s) SubCutaneous at bedtime  insulin lispro (HumaLOG) corrective regimen sliding scale   SubCutaneous Before meals and at bedtime  ondansetron Injectable 4 milliGRAM(s) IV Push every 6 hours PRN  oxyCODONE    5 mG/acetaminophen 325 mG 1 Tablet(s) Oral every 4 hours PRN  pantoprazole    Tablet 40 milliGRAM(s) Oral before breakfast  QUEtiapine 200 milliGRAM(s) Oral daily  simvastatin 40 milliGRAM(s) Oral at bedtime    FAMILY HISTORY:  Family history of COPD (chronic obstructive pulmonary disease)  Family history of diabetes mellitus      VITALS:  Vital Signs Last 24 Hrs  T(C): 36.7 (02 May 2019 12:53), Max: 37.1 (02 May 2019 05:02)  T(F): 98 (02 May 2019 12:53), Max: 98.7 (02 May 2019 05:02)  HR: 83 (02 May 2019 14:23) (81 - 95)  BP: 129/80 (02 May 2019 14:23) (115/80 - 172/77)  BP(mean): 96 (02 May 2019 14:23) (92 - 117)  RR: 15 (02 May 2019 14:23) (12 - 20)  SpO2: 100% (02 May 2019 14:23) (97% - 100%)    GENERAL: NAD  HEAD:  Atraumatic, Normocephalic  EYES: EOMI, PERRLA, conjunctiva and sclera clear  ENMT: Moist mucous membranes  NECK: Supple  NERVOUS SYSTEM:  Alert & Oriented X3  CHEST/LUNG: Clear to auscultation bilaterally  HEART: Regular rate and rhythm; No murmurs, rubs, or gallops  ABDOMEN: Soft, Nontender, Nondistended; Bowel sounds present  EXTREMITIES:  2+ Peripheral Pulses, mild b/l nonpitting edema    LABS/DIAGNOSTIC TESTS:                        9.2    7.90  )-----------( 311      ( 02 May 2019 14:07 )             29.7     WBC Count: 7.90 K/uL (05-02 @ 14:07)  WBC Count: 7.49 K/uL (05-02 @ 07:19)  WBC Count: 7.64 K/uL (05-01 @ 06:20)  WBC Count: 11.98 K/uL (04-30 @ 05:45)  WBC Count: 11.69 K/uL (04-30 @ 01:48)    05-02    136  |  102  |  67<H>  ----------------------------<  99  6.5<HH>   |  24  |  11.90<H>    Ca    8.0<L>      02 May 2019 14:07  Mg     2.5     05-02    TPro  7.1  /  Alb  2.9<L>  /  TBili  0.3  /  DBili  x   /  AST  10  /  ALT  18  /  AlkPhos  86  05-02          LIVER FUNCTIONS - ( 02 May 2019 07:19 )  Alb: 2.9 g/dL / Pro: 7.1 g/dL / ALK PHOS: 86 U/L / ALT: 18 U/L DA / AST: 10 U/L / GGT: x             PT/INR - ( 02 May 2019 07:19 )   PT: 11.2 sec;   INR: 1.01 ratio         PTT - ( 02 May 2019 07:19 )  PTT:35.9 sec    LACTATE:    ABG -     CULTURES:   .Urine  04-30 @ 10:56   No growth  --  --      .Blood  04-18 @ 16:48   No growth at 5 days.  --  --      .Blood  04-16 @ 11:10   Growth in anaerobic bottle: Coag Negative Staphylococcus  "Susceptibilities not performed"  "Due to technical problems, Proteus sp. will Not be reported as part of  the BCID panel until further notice"  ***Blood Panel PCR results on this specimen are available  approximately 3 hours after the Gram stain result.***  Gram stain, PCR, and/or culture results may not always  correspond due to difference in methodologies.  ************************************************************  This PCR assay was performed using [employer].  The following targets are tested for: Enterococcus,  vancomycin resistant enterococci, Listeria monocytogenes,  coagulase negative staphylococci, S. aureus,  methicillin resistant S. aureus, Streptococcus agalactiae  (Group B), S. pneumoniae, S. pyogenes (Group A),  Acinetobacter baumannii, Enterobacter cloacae, E. coli,  Klebsiella oxytoca, K. pneumoniae, Proteus sp.,  Serratia marcescens, Haemophilus influenzae,  Neisseria meningitidis, Pseudomonas aeruginosa, Candida  albicans, C. glabrata, C krusei, C parapsilosis,  C. tropicalis and the KPC resistance gene.  --  Blood Culture PCR      .Blood  04-10 @ 00:43   No growth at 5 days.  --  --      .Blood  03-27 @ 11:24   No growth at 5 days.  --  --      .Urine  03-01 @ 10:11   No growth  --  -- HPI:  39 Male with PMH of Type 2 DM, Migraine, ESRD on HD (left arm graft, M/W/F), Morbid obesity, HTN, Chr dyspnea (uses home O2 prn), Schizophrenia, Bacteremia due to foot cellulitis presented to hospital for shortness of breath and orthopnea after missing dialysis yesterday when he overslept. Pt feels like drowning in water merc[place] he lays flat. Symptoms are associated with subjective fevers and difficulty ambulation. Yesterday while taking shower he noticed painful Rt testicular nodule. No focal weakness, chest pain, abdominal pain. Despite being on dialysis, he still makes urine. Recurrent admissions in past due to non-compliance with dialysis.     FH: Sister has RA and Lupus, Father had Testicular mass  SH: Smokes, drinks alcohol and uses other drugs on weekends. Lives with family.     ED Course: Hemodynamically stable. Labs showed anemia and Hyperkalemia of 5.9. Serum Cr was 12.30. [place] of [address] and CXR showed bilateral congestion. Pt was given insulin, dextrose, calcium and Kayexalate. (30 Apr 2019 03:06)    INTERVAL HISTORY: Patient was taken to OR today 05/02 for incision and drainage of scrotal abscess. In the PACU he was noticed to have ~30 sec episode of NSVT. Patient was asymptomatic during the episode patient's BP was 139/89, HR 84, and Oxygen Saturation of 100% on 3L NC. ICU was consulted for closer monitoring. Patient denies any complaints of chest pain, palpitations, or SOB. He denies any prior episode. He does complaint of orthopnea and b/l LE swelling x 1 month. He had an ECHO on 03/04/19 with EF 55-65% and normal diastolic function. He is also scheduled for sleep study at Dr. Thurston's office on 05/08. Currently he is not on O2/BiPAP at home. Denies any other complaints at this time.     Dr. Bob on bedside evaluated the patient, and reviewed the telemetry with the ICU team. As per Dr. Bob, patient was noted to have WCT, and review of telemetry shows that WCT appears to be an artifact. EKG shows normal sinus rythm, HR: 82, QRS 82ms, QTc-469. Patient will be admitted to telemetry for closer monitoring.     PAST MEDICAL & SURGICAL HISTORY:  Migraine  HTN (hypertension)  DM (diabetes mellitus)  Bipolar disorder  Schizophrenia  ESRD on dialysis  Morbid obesity with BMI of 40.0-44.9, adult  H/O hernia repair      ALLERGIES: aspirin (Short breath)  aspirin (Swelling)  penicillin (Short breath)  penicillins (Swelling)  shellfish (Unknown)    REVIEW OF SYSTEMS:  CONSTITUTIONAL: No fever, weight loss, or fatigue  EYES: No eye pain, visual disturbances, or discharge  ENMT:  No difficulty hearing, tinnitus, vertigo; No sinus or throat pain  NECK: No pain or stiffness  BREASTS: No pain, masses, or nipple discharge  RESPIRATORY: No cough, wheezing, chills or hemoptysis; No shortness of breath  CARDIOVASCULAR:  orthopnea, b/l leg swelling  GASTROINTESTINAL: No abdominal or epigastric pain. No nausea, vomiting, or hematemesis; No diarrhea or constipation. No melena or hematochezia.  GENITOURINARY: No dysuria, frequency, hematuria, or incontinence  NEUROLOGICAL: No headaches, memory loss, loss of strength, numbness, or tremors  SKIN: No itching, burning, rashes, or lesions   LYMPH NODES: No enlarged glands  ENDOCRINE: No heat or cold intolerance; No hair loss  MUSCULOSKELETAL: No joint pain or swelling; No muscle, back, or extremity pain  PSYCHIATRIC: No depression, anxiety, mood swings, or difficulty sleeping        MEDS:  acetaminophen   Tablet .. 650 milliGRAM(s) Oral every 6 hours PRN  cefTRIAXone   IVPB      cefTRIAXone   IVPB 1 Gram(s) IV Intermittent every 24 hours  chlorhexidine 4% Liquid 1 Application(s) Topical two times a day  clopidogrel Tablet 75 milliGRAM(s) Oral daily  furosemide    Tablet 80 milliGRAM(s) Oral daily  heparin  Injectable 5000 Unit(s) SubCutaneous every 8 hours  hydrALAZINE 10 milliGRAM(s) Oral three times a day  HYDROmorphone  Injectable 0.5 milliGRAM(s) IV Push every 10 minutes PRN  HYDROmorphone  Injectable 1 milliGRAM(s) IV Push every 10 minutes PRN  insulin glargine Injectable (LANTUS) 8 Unit(s) SubCutaneous at bedtime  insulin lispro (HumaLOG) corrective regimen sliding scale   SubCutaneous Before meals and at bedtime  ondansetron Injectable 4 milliGRAM(s) IV Push every 6 hours PRN  oxyCODONE    5 mG/acetaminophen 325 mG 1 Tablet(s) Oral every 4 hours PRN  pantoprazole    Tablet 40 milliGRAM(s) Oral before breakfast  QUEtiapine 200 milliGRAM(s) Oral daily  simvastatin 40 milliGRAM(s) Oral at bedtime    FAMILY HISTORY:  Family history of COPD (chronic obstructive pulmonary disease)  Family history of diabetes mellitus      VITALS:  Vital Signs Last 24 Hrs  T(C): 36.7 (02 May 2019 12:53), Max: 37.1 (02 May 2019 05:02)  T(F): 98 (02 May 2019 12:53), Max: 98.7 (02 May 2019 05:02)  HR: 83 (02 May 2019 14:23) (81 - 95)  BP: 129/80 (02 May 2019 14:23) (115/80 - 172/77)  BP(mean): 96 (02 May 2019 14:23) (92 - 117)  RR: 15 (02 May 2019 14:23) (12 - 20)  SpO2: 100% (02 May 2019 14:23) (97% - 100%)    GENERAL: NAD  HEAD:  Atraumatic, Normocephalic  EYES: EOMI, PERRLA, conjunctiva and sclera clear  ENMT: Moist mucous membranes  NECK: Supple  NERVOUS SYSTEM:  Alert & Oriented X3  CHEST/LUNG: Clear to auscultation bilaterally  HEART: Regular rate and rhythm; No murmurs, rubs, or gallops  ABDOMEN: Soft, Nontender, Nondistended; Bowel sounds present  EXTREMITIES:  2+ Peripheral Pulses, mild b/l nonpitting edema    LABS/DIAGNOSTIC TESTS:                        9.2    7.90  )-----------( 311      ( 02 May 2019 14:07 )             29.7     WBC Count: 7.90 K/uL (05-02 @ 14:07)  WBC Count: 7.49 K/uL (05-02 @ 07:19)  WBC Count: 7.64 K/uL (05-01 @ 06:20)  WBC Count: 11.98 K/uL (04-30 @ 05:45)  WBC Count: 11.69 K/uL (04-30 @ 01:48)    05-02    136  |  102  |  67<H>  ----------------------------<  99  6.5<HH>   |  24  |  11.90<H>    Ca    8.0<L>      02 May 2019 14:07  Mg     2.5     05-02    TPro  7.1  /  Alb  2.9<L>  /  TBili  0.3  /  DBili  x   /  AST  10  /  ALT  18  /  AlkPhos  86  05-02          LIVER FUNCTIONS - ( 02 May 2019 07:19 )  Alb: 2.9 g/dL / Pro: 7.1 g/dL / ALK PHOS: 86 U/L / ALT: 18 U/L DA / AST: 10 U/L / GGT: x             PT/INR - ( 02 May 2019 07:19 )   PT: 11.2 sec;   INR: 1.01 ratio         PTT - ( 02 May 2019 07:19 )  PTT:35.9 sec    LACTATE:    ABG -     CULTURES:   .Urine  04-30 @ 10:56   No growth  --  --      .Blood  04-18 @ 16:48   No growth at 5 days.  --  --      .Blood  04-16 @ 11:10   Growth in anaerobic bottle: Coag Negative Staphylococcus  "Susceptibilities not performed"  "Due to technical problems, Proteus sp. will Not be reported as part of  the BCID panel until further notice"  ***Blood Panel PCR results on this specimen are available  approximately 3 hours after the Gram stain result.***  Gram stain, PCR, and/or culture results may not always  correspond due to difference in methodologies.  ************************************************************  This PCR assay was performed using [employer].  The following targets are tested for: Enterococcus,  vancomycin resistant enterococci, Listeria monocytogenes,  coagulase negative staphylococci, S. aureus,  methicillin resistant S. aureus, Streptococcus agalactiae  (Group B), S. pneumoniae, S. pyogenes (Group A),  Acinetobacter baumannii, Enterobacter cloacae, E. coli,  Klebsiella oxytoca, K. pneumoniae, Proteus sp.,  Serratia marcescens, Haemophilus influenzae,  Neisseria meningitidis, Pseudomonas aeruginosa, Candida  albicans, C. glabrata, C krusei, C parapsilosis,  C. tropicalis and the KPC resistance gene.  --  Blood Culture PCR      .Blood  04-10 @ 00:43   No growth at 5 days.  --  --      .Blood  03-27 @ 11:24   No growth at 5 days.  --  --      .Urine  03-01 @ 10:11   No growth  --  --

## 2019-05-02 NOTE — PROGRESS NOTE ADULT - ASSESSMENT
39y.o. Male s/p excision of scrotal mass    -Tele monitoring per cardio  -Trend cardiac enzymes  -Remove dressing POD#2  -Complete abx course

## 2019-05-02 NOTE — PROGRESS NOTE ADULT - SUBJECTIVE AND OBJECTIVE BOX
HPI:  39 Male with PMH of Type 2 DM, Migraine, ESRD on HD (left arm graft, M/W/F), Morbid obesity, HTN, Chr dyspnea (uses home O2 prn), Schizophrenia, Bacteremia due to foot cellulitis presented to hospital for shortness of breath and orthopnea after missing dialysis yesterday when he overslept. Pt feels like drowning in water merc[place] he lays flat. Symptoms are associated with subjective fevers and difficulty ambulation. Yesterday while taking shower he noticed painful Rt testicular nodule. No focal weakness, chest pain, abdominal pain. Despite being on dialysis, he still makes urine. Recurrent admissions in past due to non-compliance with dialysis.     FH: Sister has RA and Lupus, Father had Testicular mass  SH: Smokes, drinks alcohol and uses other drugs on weekends. Lives with family.     ED Course: Hemodynamically stable. Labs showed anemia and Hyperkalemia of 5.9. Serum Cr was 12.30. [place] of [address] and CXR showed bilateral congestion. Pt was given insulin, dextrose, calcium and Kayexalate. (30 Apr 2019 03:06)      Patient is a 39y old  Male who presents with a chief complaint of Shortness of breath and leg swelling (01 May 2019 18:57)      INTERVAL HPI/OVERNIGHT EVENTS:  T(C): 37.1 (05-02-19 @ 05:02), Max: 37.1 (05-02-19 @ 05:02)  HR: 82 (05-02-19 @ 05:02) (82 - 95)  BP: 172/77 (05-02-19 @ 05:02) (146/95 - 172/77)  RR: 17 (05-02-19 @ 05:02) (17 - 18)  SpO2: 100% (05-02-19 @ 05:02) (97% - 100%)  Wt(kg): --  I&O's Summary      REVIEW OF SYSTEMS: denies fever, chills, SOB, palpitations, chest pain, abdominal pain, nausea, vomitting, diarrhea, constipation, dizziness    MEDICATIONS  (STANDING):  cefTRIAXone   IVPB      cefTRIAXone   IVPB 1 Gram(s) IV Intermittent every 24 hours  chlorhexidine 4% Liquid 1 Application(s) Topical two times a day  clopidogrel Tablet 75 milliGRAM(s) Oral daily  furosemide    Tablet 80 milliGRAM(s) Oral daily  heparin  Injectable 5000 Unit(s) SubCutaneous every 8 hours  hydrALAZINE 10 milliGRAM(s) Oral three times a day  insulin glargine Injectable (LANTUS) 8 Unit(s) SubCutaneous at bedtime  insulin lispro (HumaLOG) corrective regimen sliding scale   SubCutaneous Before meals and at bedtime  pantoprazole    Tablet 40 milliGRAM(s) Oral before breakfast  QUEtiapine 200 milliGRAM(s) Oral daily  simvastatin 40 milliGRAM(s) Oral at bedtime    MEDICATIONS  (PRN):  acetaminophen   Tablet .. 650 milliGRAM(s) Oral every 6 hours PRN Temp greater or equal to 38C (100.4F), Mild Pain (1 - 3)  HYDROmorphone  Injectable 0.5 milliGRAM(s) IV Push every 10 minutes PRN Moderate Pain (4 - 6)  HYDROmorphone  Injectable 1 milliGRAM(s) IV Push every 10 minutes PRN Severe Pain (7 - 10)  ondansetron Injectable 4 milliGRAM(s) IV Push every 6 hours PRN Nausea  oxyCODONE    5 mG/acetaminophen 325 mG 1 Tablet(s) Oral every 4 hours PRN Moderate Pain (4 - 6)      PHYSICAL EXAM:  GENERAL: NAD, well-groomed, well-developed  HEAD:  Atraumatic, Normocephalic  EYES: EOMI, PERRLA, conjunctiva and sclera clear  ENMT: No tonsillar erythema, exudates, or enlargement; Moist mucous membranes, Good dentition, No lesions  NECK: Supple, No JVD, Normal thyroid  NERVOUS SYSTEM:  Alert & Oriented X3, Good concentration; Motor Strength 5/5 B/L upper and lower extremities; DTRs 2+ intact and symmetric  CHEST/LUNG: Clear to percussion bilaterally; No rales, rhonchi, wheezing, or rubs  HEART: Regular rate and rhythm; No murmurs, rubs, or gallops  ABDOMEN: Soft, Nontender, Nondistended; Bowel sounds present  EXTREMITIES:  2+ Peripheral Pulses, No clubbing, cyanosis, or edema  LYMPH: No lymphadenopathy noted  SKIN: No rashes or lesions  LABS:                        9.4    7.49  )-----------( 318      ( 02 May 2019 07:19 )             29.9     05-02    137  |  101  |  64<H>  ----------------------------<  107<H>  5.3   |  25  |  11.40<H>    Ca    8.0<L>      02 May 2019 07:19    TPro  7.1  /  Alb  2.9<L>  /  TBili  0.3  /  DBili  x   /  AST  10  /  ALT  18  /  AlkPhos  86  05-02    PT/INR - ( 02 May 2019 07:19 )   PT: 11.2 sec;   INR: 1.01 ratio         PTT - ( 02 May 2019 07:19 )  PTT:35.9 sec    CAPILLARY BLOOD GLUCOSE      POCT Blood Glucose.: 120 mg/dL (02 May 2019 12:09)  POCT Blood Glucose.: 110 mg/dL (02 May 2019 07:47)  POCT Blood Glucose.: 153 mg/dL (01 May 2019 21:22)  POCT Blood Glucose.: 141 mg/dL (01 May 2019 16:27)

## 2019-05-02 NOTE — PROGRESS NOTE ADULT - SUBJECTIVE AND OBJECTIVE BOX
[facility] : Progress Note :: [phone], (office [phone]),   Dr La / Dr Hui / Dr Barber / Dr Villalobos / Dr Hang CASTELLANO / Dr Mehta / Dr Sanchez / Dr Alber dumont  _____________________________________________________________________________________________    s/p scrotal surgery. Was noted with non-sustained V-TACH in PACU. Seen on HD  with no acute distress.    aspirin (Short breath)  aspirin (Swelling)  penicillin (Short breath)  penicillins (Swelling)  shellfish (Unknown)    Hospital Medications:   MEDICATIONS  (STANDING):  acetaminophen  IVPB .. 1000 milliGRAM(s) IV Intermittent once  cefTRIAXone   IVPB      cefTRIAXone   IVPB 1 Gram(s) IV Intermittent every 24 hours  chlorhexidine 4% Liquid 1 Application(s) Topical two times a day  clopidogrel Tablet 75 milliGRAM(s) Oral daily  furosemide    Tablet 80 milliGRAM(s) Oral daily  heparin  Injectable 5000 Unit(s) SubCutaneous every 8 hours  hydrALAZINE 10 milliGRAM(s) Oral three times a day  insulin glargine Injectable (LANTUS) 8 Unit(s) SubCutaneous at bedtime  insulin lispro (HumaLOG) corrective regimen sliding scale   SubCutaneous Before meals and at bedtime  pantoprazole    Tablet 40 milliGRAM(s) Oral before breakfast  QUEtiapine 200 milliGRAM(s) Oral daily  simvastatin 40 milliGRAM(s) Oral at bedtime      VITALS:  T(F): 98.3 (19 @ 19:45), Max: 98.7 (19 @ 05:02)  HR: 90 (19 @ 19:45)  BP: 143/76 (19 @ 19:45)  RR: 20 (19 @ 19:45)  SpO2: 97% (19 @ 19:45)  Wt(kg): --      PHYSICAL EXAM:  Constitutional: NAD  HEENT: anicteric sclera, oropharynx clear.  Neck: No JVD  Respiratory: CTAB, no wheezes, rales or rhonchi  Cardiovascular: S1, S2, RRR  Gastrointestinal: BS+, soft, NT/ND  Extremities:   peripheral edema++  Neurological: A/O x 3, no focal deficits  Vascular Access: AVF canulated.    LABS:      136  |  102  |  67<H>  ----------------------------<  99  6.5<HH>   |  24  |  11.90<H>    Ca    8.0<L>      02 May 2019 14:07  Mg     2.5         TPro  7.1  /  Alb  2.9<L>  /  TBili  0.3  /  DBili      /  AST  10  /  ALT  18  /  AlkPhos  86      Creatinine Trend: 11.90 <--, 11.40 <--, 10.10 <--, 12.60 <--, 12.30 <--, 11.20 <--                        9.2    7.90  )-----------( 311      ( 02 May 2019 14:07 )             29.7     Urine Studies:  Urinalysis Basic - ( 2019 04:26 )    Color: Yellow / Appearance: Clear / S.010 / pH:   Gluc:  / Ketone: Negative  / Bili: Negative / Urobili: Negative   Blood:  / Protein: 500 mg/dL / Nitrite: Negative   Leuk Esterase: Negative / RBC: 2-5 /HPF / WBC 0-2 /HPF   Sq Epi:  / Non Sq Epi:  / Bacteria:         RADIOLOGY & ADDITIONAL STUDIES:

## 2019-05-02 NOTE — CONSULT NOTE ADULT - PROBLEM SELECTOR RECOMMENDATION 9
Questionable ~30 sec episode of NSVT on tele at PACU  likely an artifact as discussed with Dr. Bob  Patient asymptomatic, vitals are stable  ECHO 03/04/19- EF- 55-65%, normal diastolic dysfunction  EKG after the episode- NSR, QRS- 86, QTc- 469  - Admit to telemetry for cardiac monitoring  - Repeat all electrolytes including Mag, and phos  - send cardiac enzymes  - Cardiology- Dr. Bob

## 2019-05-02 NOTE — CONSULT NOTE ADULT - SUBJECTIVE AND OBJECTIVE BOX
CHIEF COMPLAINT:Patient is a 39y old  Male who presents with a chief complaint of Shortness of breath and leg swelling .      HPI:  39 Male with PMHX of Type 2 DM, Migraine, ESRD on HD (left arm graft, M/W/F), Morbid obesity, HTN, Chr dyspnea (uses home O2 prn), Schizophrenia, Bacteremia due to foot cellulitis presented to hospital for shortness of breath and orthopnea after missing dialysis yesterday when he overslept. Pt feels like drowning in water [place] he lays flat. Symptoms are associated with subjective fevers and difficulty ambulation. Yesterday while taking shower he noticed painful Rt testicular nodule. No focal weakness, chest pain, abdominal pain. Despite being on dialysis, he still makes urine. Recurrent admissions in past due to non-compliance with dialysis.  Pt s/p scrotal abcess drainage, was noted to have WCT on tele. Tele reviewed, WCT appears to be artifact,     PAST MEDICAL & SURGICAL HISTORY:  Migraine  HTN (hypertension)  DM (diabetes mellitus)  Bipolar disorder  Schizophrenia  ESRD on dialysis  Morbid obesity with BMI of 40.0-44.9, adult  H/O hernia repair      MEDICATIONS  (STANDING):  cefTRIAXone   IVPB      cefTRIAXone   IVPB 1 Gram(s) IV Intermittent every 24 hours  chlorhexidine 4% Liquid 1 Application(s) Topical two times a day  clopidogrel Tablet 75 milliGRAM(s) Oral daily  furosemide    Tablet 80 milliGRAM(s) Oral daily  heparin  Injectable 5000 Unit(s) SubCutaneous every 8 hours  hydrALAZINE 10 milliGRAM(s) Oral three times a day  insulin glargine Injectable (LANTUS) 8 Unit(s) SubCutaneous at bedtime  insulin lispro (HumaLOG) corrective regimen sliding scale   SubCutaneous Before meals and at bedtime  pantoprazole    Tablet 40 milliGRAM(s) Oral before breakfast  QUEtiapine 200 milliGRAM(s) Oral daily  simvastatin 40 milliGRAM(s) Oral at bedtime    MEDICATIONS  (PRN):  acetaminophen   Tablet .. 650 milliGRAM(s) Oral every 6 hours PRN Temp greater or equal to 38C (100.4F), Mild Pain (1 - 3)  HYDROmorphone  Injectable 0.5 milliGRAM(s) IV Push every 10 minutes PRN Moderate Pain (4 - 6)  HYDROmorphone  Injectable 1 milliGRAM(s) IV Push every 10 minutes PRN Severe Pain (7 - 10)  ondansetron Injectable 4 milliGRAM(s) IV Push every 6 hours PRN Nausea  oxyCODONE    5 mG/acetaminophen 325 mG 1 Tablet(s) Oral every 4 hours PRN Moderate Pain (4 - 6)      FAMILY HISTORY:  Family history of COPD (chronic obstructive pulmonary disease)  Family history of diabetes mellitus  Sister has RA and Lupus,   Father had Testicular mass    SOCIAL HISTORY:    [x ] Non-smoker    [ x] Alcohol-denies    Allergies    aspirin (Short breath)  aspirin (Swelling)  penicillin (Short breath)  penicillins (Swelling)  shellfish (Unknown)    Intolerances    	    REVIEW OF SYSTEMS:  CONSTITUTIONAL: No fever, weight loss, or fatigue  EYES: No eye pain, visual disturbances, or discharge  ENT:  No difficulty hearing, tinnitus, vertigo; No sinus or throat pain  NECK: No pain or stiffness  RESPIRATORY: No cough, wheezing, chills or hemoptysis; No Shortness of Breath  CARDIOVASCULAR: No chest pain, palpitations, passing out, dizziness, or leg swelling  GASTROINTESTINAL: No abdominal or epigastric pain. No nausea, vomiting, or hematemesis; No diarrhea or constipation. No melena or hematochezia.  GENITOURINARY: No dysuria, frequency, hematuria, or incontinence  NEUROLOGICAL: No headaches, memory loss, loss of strength, numbness, or tremors  SKIN: No itching, burning, rashes, or lesions   LYMPH Nodes: No enlarged glands  ENDOCRINE: No heat or cold intolerance; No hair loss  MUSCULOSKELETAL: No joint pain or swelling; No muscle, back, or extremity pain  PSYCHIATRIC: No depression, anxiety, mood swings, or difficulty sleeping  HEME/LYMPH: No easy bruising, or bleeding gums  ALLERGY AND IMMUNOLOGIC: No hives or eczema	    PHYSICAL EXAM:  T(C): 36.7 (05-02-19 @ 12:53), Max: 37.1 (05-02-19 @ 05:02)  HR: 83 (05-02-19 @ 14:23) (81 - 95)  BP: 129/80 (05-02-19 @ 14:23) (115/80 - 172/77)  RR: 15 (05-02-19 @ 14:23) (12 - 20)  SpO2: 100% (05-02-19 @ 14:23) (97% - 100%)      Appearance: Normal	  HEENT:   Normal oral mucosa, PERRL, EOMI	  Lymphatic: No lymphadenopathy  Cardiovascular: Normal S1 S2, No JVD, No murmurs, No edema  Respiratory: Lungs clear to auscultation	  Psychiatry: A & O x 3, Mood & affect appropriate  Gastrointestinal:  Soft, Non-tender, + BS	  Skin: No rashes, No ecchymoses, No cyanosis	  Neurologic: Non-focal  Extremities: Normal range of motion, No clubbing, cyanosis or edema  Vascular: Peripheral pulses palpable 2+ bilaterally    	    ECG:  Normal sinus rhythm  Left anterior fascicular block  Nonspecific T wave abnormality  	    	  LABS:	 	                        9.2    7.90  )-----------( 311      ( 02 May 2019 14:07 )             29.7     05-02    136  |  102  |  67<H>  ----------------------------<  99  6.5<HH>   |  24  |  11.90<H>    Ca    8.0<L>      02 May 2019 14:07  Mg     2.5     05-02    TPro  7.1  /  Alb  2.9<L>  /  TBili  0.3  /  DBili  x   /  AST  10  /  ALT  18  /  AlkPhos  86  05-02        OBSERVATIONS:  Mitral Valve: Normal mitral valve.  Aortic Root: Aortic Root: 3.7 cm.  Aortic Valve: Normal trileaflet aortic valve.  Left Atrium: Normal left atrium.  LA volume index = 28  cc/m2.  Left Ventricle: Normal Left Ventricular Systolic Function,  (EF = 55 to 60%) Moderate concentric left ventricular  hypertrophy. Normal diastolic function.  Right Heart: Normal right atrium. Normal right ventricular  size and function. There is mild tricuspid regurgitation.  Normal pulmonic valve.  Pericardium/PleuraNormal pericardium with no pericardial  effusion.  Hemodynamic: Unable to estimate RVSP.

## 2019-05-03 ENCOUNTER — TRANSCRIPTION ENCOUNTER (OUTPATIENT)
Age: 39
End: 2019-05-03

## 2019-05-03 VITALS
TEMPERATURE: 98 F | RESPIRATION RATE: 20 BRPM | HEART RATE: 84 BPM | DIASTOLIC BLOOD PRESSURE: 75 MMHG | OXYGEN SATURATION: 100 % | SYSTOLIC BLOOD PRESSURE: 135 MMHG

## 2019-05-03 DIAGNOSIS — E87.5 HYPERKALEMIA: ICD-10-CM

## 2019-05-03 LAB
-  AMPICILLIN: SIGNIFICANT CHANGE UP
-  DAPTOMYCIN: SIGNIFICANT CHANGE UP
-  LEVOFLOXACIN: SIGNIFICANT CHANGE UP
-  LINEZOLID: SIGNIFICANT CHANGE UP
-  TETRACYCLINE: SIGNIFICANT CHANGE UP
-  VANCOMYCIN: SIGNIFICANT CHANGE UP
ANION GAP SERPL CALC-SCNC: 5 MMOL/L — SIGNIFICANT CHANGE UP (ref 5–17)
BUN SERPL-MCNC: 50 MG/DL — HIGH (ref 7–18)
CALCIUM SERPL-MCNC: 8.4 MG/DL — SIGNIFICANT CHANGE UP (ref 8.4–10.5)
CHLORIDE SERPL-SCNC: 99 MMOL/L — SIGNIFICANT CHANGE UP (ref 96–108)
CO2 SERPL-SCNC: 30 MMOL/L — SIGNIFICANT CHANGE UP (ref 22–31)
CREAT SERPL-MCNC: 10.1 MG/DL — HIGH (ref 0.5–1.3)
GLUCOSE BLDC GLUCOMTR-MCNC: 112 MG/DL — HIGH (ref 70–99)
GLUCOSE BLDC GLUCOMTR-MCNC: 168 MG/DL — HIGH (ref 70–99)
GLUCOSE SERPL-MCNC: 156 MG/DL — HIGH (ref 70–99)
HCT VFR BLD CALC: 30.9 % — LOW (ref 39–50)
HGB BLD-MCNC: 9.5 G/DL — LOW (ref 13–17)
MAGNESIUM SERPL-MCNC: 2.6 MG/DL — SIGNIFICANT CHANGE UP (ref 1.6–2.6)
MCHC RBC-ENTMCNC: 30.4 PG — SIGNIFICANT CHANGE UP (ref 27–34)
MCHC RBC-ENTMCNC: 30.7 GM/DL — LOW (ref 32–36)
MCV RBC AUTO: 98.7 FL — SIGNIFICANT CHANGE UP (ref 80–100)
METHOD TYPE: SIGNIFICANT CHANGE UP
NRBC # BLD: 0 /100 WBCS — SIGNIFICANT CHANGE UP (ref 0–0)
PHOSPHATE SERPL-MCNC: 8.6 MG/DL — HIGH (ref 2.5–4.5)
PLATELET # BLD AUTO: 295 K/UL — SIGNIFICANT CHANGE UP (ref 150–400)
POTASSIUM SERPL-MCNC: 5.1 MMOL/L — SIGNIFICANT CHANGE UP (ref 3.5–5.3)
POTASSIUM SERPL-SCNC: 5.1 MMOL/L — SIGNIFICANT CHANGE UP (ref 3.5–5.3)
RBC # BLD: 3.13 M/UL — LOW (ref 4.2–5.8)
RBC # FLD: 15.5 % — HIGH (ref 10.3–14.5)
SODIUM SERPL-SCNC: 134 MMOL/L — LOW (ref 135–145)
WBC # BLD: 6.82 K/UL — SIGNIFICANT CHANGE UP (ref 3.8–10.5)
WBC # FLD AUTO: 6.82 K/UL — SIGNIFICANT CHANGE UP (ref 3.8–10.5)

## 2019-05-03 PROCEDURE — 71045 X-RAY EXAM CHEST 1 VIEW: CPT

## 2019-05-03 PROCEDURE — 36415 COLL VENOUS BLD VENIPUNCTURE: CPT

## 2019-05-03 PROCEDURE — 85027 COMPLETE CBC AUTOMATED: CPT

## 2019-05-03 PROCEDURE — 80307 DRUG TEST PRSMV CHEM ANLYZR: CPT

## 2019-05-03 PROCEDURE — 87186 SC STD MICRODIL/AGAR DIL: CPT

## 2019-05-03 PROCEDURE — 99284 EMERGENCY DEPT VISIT MOD MDM: CPT

## 2019-05-03 PROCEDURE — 80048 BASIC METABOLIC PNL TOTAL CA: CPT

## 2019-05-03 PROCEDURE — 82553 CREATINE MB FRACTION: CPT

## 2019-05-03 PROCEDURE — 87591 N.GONORRHOEAE DNA AMP PROB: CPT

## 2019-05-03 PROCEDURE — 80053 COMPREHEN METABOLIC PANEL: CPT

## 2019-05-03 PROCEDURE — 84100 ASSAY OF PHOSPHORUS: CPT

## 2019-05-03 PROCEDURE — 86850 RBC ANTIBODY SCREEN: CPT

## 2019-05-03 PROCEDURE — 86901 BLOOD TYPING SEROLOGIC RH(D): CPT

## 2019-05-03 PROCEDURE — 83880 ASSAY OF NATRIURETIC PEPTIDE: CPT

## 2019-05-03 PROCEDURE — 87070 CULTURE OTHR SPECIMN AEROBIC: CPT

## 2019-05-03 PROCEDURE — 99261: CPT

## 2019-05-03 PROCEDURE — 87086 URINE CULTURE/COLONY COUNT: CPT

## 2019-05-03 PROCEDURE — 85730 THROMBOPLASTIN TIME PARTIAL: CPT

## 2019-05-03 PROCEDURE — 87491 CHLMYD TRACH DNA AMP PROBE: CPT

## 2019-05-03 PROCEDURE — 99285 EMERGENCY DEPT VISIT HI MDM: CPT | Mod: 25

## 2019-05-03 PROCEDURE — 82962 GLUCOSE BLOOD TEST: CPT

## 2019-05-03 PROCEDURE — 87040 BLOOD CULTURE FOR BACTERIA: CPT

## 2019-05-03 PROCEDURE — 87205 SMEAR GRAM STAIN: CPT

## 2019-05-03 PROCEDURE — 88305 TISSUE EXAM BY PATHOLOGIST: CPT

## 2019-05-03 PROCEDURE — 93005 ELECTROCARDIOGRAM TRACING: CPT

## 2019-05-03 PROCEDURE — 81001 URINALYSIS AUTO W/SCOPE: CPT

## 2019-05-03 PROCEDURE — 82746 ASSAY OF FOLIC ACID SERUM: CPT

## 2019-05-03 PROCEDURE — 83735 ASSAY OF MAGNESIUM: CPT

## 2019-05-03 PROCEDURE — 82550 ASSAY OF CK (CPK): CPT

## 2019-05-03 PROCEDURE — 86900 BLOOD TYPING SEROLOGIC ABO: CPT

## 2019-05-03 PROCEDURE — 85610 PROTHROMBIN TIME: CPT

## 2019-05-03 PROCEDURE — 84484 ASSAY OF TROPONIN QUANT: CPT

## 2019-05-03 RX ORDER — HYDRALAZINE HCL 50 MG
25 TABLET ORAL EVERY 8 HOURS
Qty: 0 | Refills: 0 | Status: DISCONTINUED | OUTPATIENT
Start: 2019-05-03 | End: 2019-05-03

## 2019-05-03 RX ORDER — LINEZOLID 600 MG/300ML
1 INJECTION, SOLUTION INTRAVENOUS
Qty: 10 | Refills: 0
Start: 2019-05-03 | End: 2019-05-07

## 2019-05-03 RX ORDER — ACETAMINOPHEN 500 MG
2 TABLET ORAL
Qty: 0 | Refills: 0 | DISCHARGE
Start: 2019-05-03

## 2019-05-03 RX ORDER — LINEZOLID 600 MG/300ML
600 INJECTION, SOLUTION INTRAVENOUS EVERY 12 HOURS
Qty: 0 | Refills: 0 | Status: DISCONTINUED | OUTPATIENT
Start: 2019-05-03 | End: 2019-05-03

## 2019-05-03 RX ORDER — INSULIN LISPRO 100/ML
10 VIAL (ML) SUBCUTANEOUS
Qty: 0 | Refills: 0 | COMMUNITY

## 2019-05-03 RX ORDER — MORPHINE SULFATE 50 MG/1
1 CAPSULE, EXTENDED RELEASE ORAL ONCE
Qty: 0 | Refills: 0 | Status: DISCONTINUED | OUTPATIENT
Start: 2019-05-03 | End: 2019-05-03

## 2019-05-03 RX ORDER — METOPROLOL TARTRATE 50 MG
12.5 TABLET ORAL
Qty: 0 | Refills: 0 | Status: DISCONTINUED | OUTPATIENT
Start: 2019-05-03 | End: 2019-05-03

## 2019-05-03 RX ORDER — METOPROLOL TARTRATE 50 MG
0.5 TABLET ORAL
Qty: 30 | Refills: 0
Start: 2019-05-03 | End: 2019-06-01

## 2019-05-03 RX ADMIN — MORPHINE SULFATE 1 MILLIGRAM(S): 50 CAPSULE, EXTENDED RELEASE ORAL at 03:11

## 2019-05-03 RX ADMIN — Medication 1: at 12:05

## 2019-05-03 RX ADMIN — Medication 80 MILLIGRAM(S): at 06:57

## 2019-05-03 RX ADMIN — QUETIAPINE FUMARATE 200 MILLIGRAM(S): 200 TABLET, FILM COATED ORAL at 14:39

## 2019-05-03 RX ADMIN — Medication 25 MILLIGRAM(S): at 14:39

## 2019-05-03 RX ADMIN — PANTOPRAZOLE SODIUM 40 MILLIGRAM(S): 20 TABLET, DELAYED RELEASE ORAL at 06:57

## 2019-05-03 RX ADMIN — CHLORHEXIDINE GLUCONATE 1 APPLICATION(S): 213 SOLUTION TOPICAL at 06:56

## 2019-05-03 RX ADMIN — HEPARIN SODIUM 5000 UNIT(S): 5000 INJECTION INTRAVENOUS; SUBCUTANEOUS at 14:40

## 2019-05-03 RX ADMIN — CLOPIDOGREL BISULFATE 75 MILLIGRAM(S): 75 TABLET, FILM COATED ORAL at 12:05

## 2019-05-03 RX ADMIN — LINEZOLID 600 MILLIGRAM(S): 600 INJECTION, SOLUTION INTRAVENOUS at 16:07

## 2019-05-03 RX ADMIN — HEPARIN SODIUM 5000 UNIT(S): 5000 INJECTION INTRAVENOUS; SUBCUTANEOUS at 06:58

## 2019-05-03 RX ADMIN — Medication 10 MILLIGRAM(S): at 06:57

## 2019-05-03 NOTE — PROGRESS NOTE ADULT - ASSESSMENT
39 Male with PMHX of Type 2 DM, Migraine, ESRD on HD (left arm graft, M/W/F), Morbid obesity, HTN, Chr dyspnea (uses home O2 prn), Schizophrenia, Bacteremia due to foot cellulitis presented to hospital for shortness of breath and orthopnea after missing dialysis, scrotal abcess- s/p I and D.  1.Tele monitoring.  2.ABX as per ID.  3.ESRD -HD as per renal.  4.DM-insulin.  5.Lipid d/o-statin.  6.HTN-inc hydralazine 25mg tid.  7.Schizophrenia-psych medication.  8.Sleep study as outpatient-R/O KRYSTIN.  9.WCT-add lopressor 12.5 mg bid. Stress test as outpatient-over weight limit of machine in hospital.  10.GI and DVT prophylaxis.

## 2019-05-03 NOTE — CONSULT NOTE ADULT - REASON FOR ADMISSION
Shortness of breath and leg swelling

## 2019-05-03 NOTE — DISCHARGE NOTE PROVIDER - HOSPITAL COURSE
39 Male with PMH of Type 2 DM, Migraine, ESRD on HD (left arm graft, M/W/F), Morbid obesity, HTN, Chr dyspnea (uses home O2 prn), Schizophrenia, Bacteremia due to foot cellulitis presented to hospital for shortness of breath and orthopnea after missing dialysis yesterday when he overslept. Pt feels like drowning in water [place] he lays flat. Symptoms are associated with subjective fevers and difficulty ambulation. Yesterday while taking shower he noticed painful Rt testicular nodule. No focal weakness, chest pain, abdominal pain. Despite being on dialysis, he still makes urine. Recurrent admissions in past due to non-compliance with dialysis.         FH: Sister has RA and Lupus, Father had Testicular mass    SH: Smokes, drinks alcohol and uses other drugs on weekends. Lives with family.         ED Course: Hemodynamically stable. Labs showed anemia and Hyperkalemia of 5.9. Serum Cr was 12.30. [place] of [address] and CXR showed bilateral congestion. Pt was given insulin, dextrose, calcium and Kayexalate.          Patient was noted to have scrotal mass, tender, there was concern for sebaceous cyst vs early abscess. Urology was consulted, Dr Gillis, patient had debridement of scrotal abscess on 5/2.    Post surgery he had episodes of Vtach , cardiology was consulted, it was artifact. He was admitted to telemetry floor for monitoring. He was noted to have an      episode of non sustained Vtach on tele, could be secondary to electrolyte imbalance. patient had his HD later. Cardiologist adjusted medications and recommended out-patient stress test.         Patient is to be discharged on oral antibiotics and need to follow up with urologist. 39 Male with PMH of Type 2 DM, Migraine, ESRD on HD (left arm graft, M/W/F), Morbid obesity, HTN, Chr dyspnea (uses home O2 prn), Schizophrenia, Bacteremia due to foot cellulitis presented to hospital for shortness of breath and orthopnea after missing dialysis yesterday when he overslept. Pt feels like drowning in water [place] he lays flat. Symptoms are associated with subjective fevers and difficulty ambulation. Yesterday while taking shower he noticed painful Rt testicular nodule. No focal weakness, chest pain, abdominal pain. Despite being on dialysis, he still makes urine. Recurrent admissions in past due to non-compliance with dialysis.         FH: Sister has RA and Lupus, Father had Testicular mass    SH: Smokes, drinks alcohol and uses other drugs on weekends. Lives with family.         ED Course: Hemodynamically stable. Labs showed anemia and Hyperkalemia of 5.9. Serum Cr was 12.30. [place] [address] and CXR showed bilateral congestion. Pt was given insulin, dextrose, calcium and Kayexalate.         Nephrologist Dr. La was consulted, patient got HD and symptoms improved.          Patient was noted to have scrotal mass, tender, there was concern for sebaceous cyst vs early abscess. Urology was consulted, Dr Gillis, patient had debridement of scrotal abscess on 5/2.    Post surgery he had episodes of Vtach , cardiology was consulted, it was artifact. He was admitted to telemetry floor for monitoring. He was noted to have an      episode of non sustained Vtach on tele, could be secondary to electrolyte imbalance. patient had his HD later. Cardiologist adjusted medications and recommended out-patient stress test.         Patient is to be discharged on oral antibiotics and need to follow up with urologist.

## 2019-05-03 NOTE — PROGRESS NOTE ADULT - SUBJECTIVE AND OBJECTIVE BOX
HPI:  39 Male with PMH of Type 2 DM, Migraine, ESRD on HD (left arm graft, M/W/F), Morbid obesity, HTN, Chr dyspnea (uses home O2 prn), Schizophrenia, Bacteremia due to foot cellulitis presented to hospital for shortness of breath and orthopnea after missing dialysis yesterday when he overslept. Pt feels like drowning in water [place] he lays flat. Symptoms are associated with subjective fevers and difficulty ambulation. Yesterday while taking shower he noticed painful Rt testicular nodule. No focal weakness, chest pain, abdominal pain. Despite being on dialysis, he still makes urine. Recurrent admissions in past due to non-compliance with dialysis.     FH: Sister has RA and Lupus, Father had Testicular mass  SH: Smokes, drinks alcohol and uses other drugs on weekends. Lives with family.     ED Course: Hemodynamically stable. Labs showed anemia and Hyperkalemia of 5.9. Serum Cr was 12.30. [place]NP of [address] and CXR showed bilateral congestion. Pt was given insulin, dextrose, calcium and Kayexalate. (30 Apr 2019 03:06)      Patient is a 39y old  Male who presents with a chief complaint of Shortness of breath and leg swelling (03 May 2019 11:31)      INTERVAL HPI/OVERNIGHT EVENTS:  T(C): 36.5 (05-03-19 @ 11:00), Max: 37 (05-03-19 @ 04:48)  HR: 84 (05-03-19 @ 11:00) (84 - 93)  BP: 160/86 (05-03-19 @ 11:00) (131/70 - 160/86)  RR: 18 (05-03-19 @ 11:00) (16 - 20)  SpO2: 100% (05-03-19 @ 11:00) (95% - 100%)  Wt(kg): --  I&O's Summary    02 May 2019 07:01  -  03 May 2019 07:00  --------------------------------------------------------  IN: 0 mL / OUT: 950 mL / NET: -950 mL        REVIEW OF SYSTEMS: denies fever, chills, SOB, palpitations, chest pain, abdominal pain, nausea, vomitting, diarrhea, constipation, dizziness    MEDICATIONS  (STANDING):  acetaminophen  IVPB .. 1000 milliGRAM(s) IV Intermittent once  cefTRIAXone   IVPB      cefTRIAXone   IVPB 1 Gram(s) IV Intermittent every 24 hours  chlorhexidine 4% Liquid 1 Application(s) Topical two times a day  clopidogrel Tablet 75 milliGRAM(s) Oral daily  furosemide    Tablet 80 milliGRAM(s) Oral daily  heparin  Injectable 5000 Unit(s) SubCutaneous every 8 hours  hydrALAZINE 25 milliGRAM(s) Oral every 8 hours  insulin glargine Injectable (LANTUS) 8 Unit(s) SubCutaneous at bedtime  insulin lispro (HumaLOG) corrective regimen sliding scale   SubCutaneous Before meals and at bedtime  metoprolol tartrate 12.5 milliGRAM(s) Oral two times a day  pantoprazole    Tablet 40 milliGRAM(s) Oral before breakfast  QUEtiapine 200 milliGRAM(s) Oral daily  simvastatin 40 milliGRAM(s) Oral at bedtime    MEDICATIONS  (PRN):  acetaminophen   Tablet .. 650 milliGRAM(s) Oral every 6 hours PRN Temp greater or equal to 38C (100.4F), Mild Pain (1 - 3)  ondansetron Injectable 4 milliGRAM(s) IV Push every 6 hours PRN Nausea  oxyCODONE    5 mG/acetaminophen 325 mG 1 Tablet(s) Oral every 4 hours PRN Moderate Pain (4 - 6)      PHYSICAL EXAM:  GENERAL: NAD, well-groomed, well-developed  HEAD:  Atraumatic, Normocephalic  EYES: EOMI, PERRLA, conjunctiva and sclera clear  ENMT: No tonsillar erythema, exudates, or enlargement; Moist mucous membranes, Good dentition, No lesions  NECK: Supple, No JVD, Normal thyroid  NERVOUS SYSTEM:  Alert & Oriented X3, Good concentration; Motor Strength 5/5 B/L upper and lower extremities; DTRs 2+ intact and symmetric  CHEST/LUNG: Clear to percussion bilaterally; No rales, rhonchi, wheezing, or rubs  HEART: Regular rate and rhythm; No murmurs, rubs, or gallops  ABDOMEN: Soft, Nontender, Nondistended; Bowel sounds present  EXTREMITIES:  2+ Peripheral Pulses, No clubbing, cyanosis, or edema  LYMPH: No lymphadenopathy noted  SKIN: No rashes or lesions  LABS:                        9.5    6.82  )-----------( 295      ( 03 May 2019 12:18 )             30.9     05-03    134<L>  |  99  |  50<H>  ----------------------------<  156<H>  5.1   |  30  |  10.10<H>    Ca    8.4      03 May 2019 12:18  Phos  8.6     05-03  Mg     2.6     05-03    TPro  7.1  /  Alb  2.9<L>  /  TBili  0.3  /  DBili  x   /  AST  10  /  ALT  18  /  AlkPhos  86  05-02    PT/INR - ( 02 May 2019 07:19 )   PT: 11.2 sec;   INR: 1.01 ratio         PTT - ( 02 May 2019 07:19 )  PTT:35.9 sec    CAPILLARY BLOOD GLUCOSE      POCT Blood Glucose.: 168 mg/dL (03 May 2019 11:36)  POCT Blood Glucose.: 112 mg/dL (03 May 2019 08:17)  POCT Blood Glucose.: 130 mg/dL (02 May 2019 22:21)  POCT Blood Glucose.: 145 mg/dL (02 May 2019 21:00)  POCT Blood Glucose.: 117 mg/dL (02 May 2019 16:39)

## 2019-05-03 NOTE — CONSULT NOTE ADULT - SKIN COMMENTS
right scrotal area with 3 sutures in place, he has a small lump at the site of surgery which is mildly tender, no erythema or warmth of skin, no drainage

## 2019-05-03 NOTE — PROGRESS NOTE ADULT - ASSESSMENT
Post op vtach s/p excision of scrotal mass 5/2    1- keep dressing dry x2 days, then may remove and shower.  2- no further surgical intervention at this time  3- please continue current care, and discharge as per medicine. once discharged f/u with Dr Beckford in the office, please call for appointment

## 2019-05-03 NOTE — CONSULT NOTE ADULT - RS GEN PE MLT RESP DETAILS PC
no rales/no wheezes/no rhonchi/breath sounds equal/good air movement/clear to auscultation bilaterally

## 2019-05-03 NOTE — DISCHARGE NOTE NURSING/CASE MANAGEMENT/SOCIAL WORK - NSDCDPATPORTLINK_GEN_ALL_CORE
You can access the [facility] Portal, offered by [facility], by registering with the following website: [email or web address]

## 2019-05-03 NOTE — PROGRESS NOTE ADULT - ASSESSMENT
seen nd examined comfortable on bed not in any distress, no sob, no cp or palp.  bm ok.   vsstable physical unchanged  s/p scrotal abcess drain  d/w urology  pt had small abcess that already started drainig, no packing done    as per urologist pt needs local bacitracin.  as pt has diabetes  need antibxs  with wound care.  f/u pcp, urology  esrd on HD  d/w nephro, pt always come late to hd. seen nd examined comfortable on bed not in any distress, no sob, no cp or palp.  bm ok.   vsstable physical unchanged  s/p scrotal abcess drain  d/w urology  pt had small abcess that already started drainig, no packing done    as per urologist pt needs local bacitracin.  as pt has diabetes  need antibxs  with wound care.  f/u pcp, urology  esrd on HD  d/w nephro, pt always come late to hd.  labs noted    d/w cardiolgy attending pt can be d/c on metoprolol  htn  dose of hydralazine increased to 25    pt need to change dressing of wound  and f/u with urology  as pt has diabetes and if careless, can develope severe infections, gangrene , bacterimia.  pcp , nephro, card, uro  legs appear ok  still has dM  vascular , i have recommended him ( and podiatry)

## 2019-05-03 NOTE — DISCHARGE NOTE PROVIDER - NSDCCPCAREPLAN_GEN_ALL_CORE_FT
PRINCIPAL DISCHARGE DIAGNOSIS  Diagnosis: Scrotal abscess  Assessment and Plan of Treatment: You were noted to have scrotal swelling. Urologist was consulted, scrotal mass was excised on 5/2/2019. IV antibiotics were given. You are advised to keep dressing dry for 2 days then change and  may shower. Apply bacitacin cream on area, continue taking antibiotics by mouth as prescribed and follow up with urologist as out-patient in one week.      SECONDARY DISCHARGE DIAGNOSES  Diagnosis: ESRD on hemodialysis  Assessment and Plan of Treatment: You have history of end stage renal disease on hemodialysis. Nephrologist was following in hospital. You were short of breath on admission as you missed your dialysis session. You are advised to continue dialysis as scheduled and follow up with your nephrologist.    Diagnosis: DM (diabetes mellitus)  Assessment and Plan of Treatment: You have history of diabetes, continue taking medications as prescribed.    Diagnosis: Morbid obesity with BMI of 40.0-44.9, adult  Assessment and Plan of Treatment: You are morbidly obese, dietry counselling was provided. You are advised to follow up with baritric surgeon as out-patient. You should also get sleep study as out-patient to rule out obstructive sleep apnea.    Diagnosis: HTN (hypertension)  Assessment and Plan of Treatment: You have history of hypertension, continue taking medications as prescribed. Follow up with your primary medical doctor.

## 2019-05-03 NOTE — PROGRESS NOTE ADULT - SUBJECTIVE AND OBJECTIVE BOX
39y Male with no overnight complaints. Tolerating reg diet.  no SOB/CP/Dyspnea overnight    Vital Signs:  T(C): 37 (05-03-19 @ 04:48), Max: 37 (05-03-19 @ 04:48)  HR: 86 (05-03-19 @ 04:48) (81 - 93)  BP: 147/88 (05-03-19 @ 04:48) (115/80 - 154/92)  RR: 18 (05-03-19 @ 04:48) (12 - 20)  SpO2: 100% (05-03-19 @ 04:48) (95% - 100%)  Wt(kg): --    Physical Exam:  General: NAD, comfortable  Abdomen: soft, NT/ND  Scrotum: dressing c/d/i. wound sutured shut with absorbable sutures, no active drainage from site    Ins/Outs:    05-02 @ 07:01  -  05-03 @ 07:00  --------------------------------------------------------  IN:  Total IN: 0 mL    OUT:    Voided: 950 mL  Total OUT: 950 mL    Total NET: -950 mL                          9.2    7.90  )-----------( 311      ( 02 May 2019 14:07 )             29.7

## 2019-05-03 NOTE — CONSULT NOTE ADULT - SUBJECTIVE AND OBJECTIVE BOX
HPI:  39 Male with PMH of Type 2 DM, Migraine, ESRD on HD (left arm graft, M/W/F), Morbid obesity, HTN, Chr dyspnea (uses home O2 prn), Schizophrenia, Bacteremia due to foot cellulitis presented to hospital for shortness of breath and orthopnea after missing dialysis yesterday when he overslept. Pt feels like drowning in water mercado he lays flat. Symptoms are associated with subjective fevers and difficulty ambulation. Yesterday while taking shower he noticed painful Rt testicular nodule. No focal weakness, chest pain, abdominal pain. Despite being on dialysis, he still makes urine. Recurrent admissions in past due to non-compliance with dialysis.     FH: Sister has RA and Lupus, Father had Testicular mass  SH: Smokes, drinks alcohol and uses other drugs on weekends. Lives with family.     ED Course: Hemodynamically stable. Labs showed anemia and Hyperkalemia of 5.9. Serum Cr was 12.30. [place] of [address] and CXR showed bilateral congestion. Pt was given insulin, dextrose, calcium and Kayexalate. (30 Apr 2019 03:06)      PAST MEDICAL & SURGICAL HISTORY:  Migraine  HTN (hypertension)  DM (diabetes mellitus)  Bipolar disorder  Schizophrenia  ESRD on dialysis  Morbid obesity with BMI of 40.0-44.9, adult  H/O hernia repair      aspirin (Short breath)  aspirin (Swelling)  penicillin (Short breath)  penicillins (Swelling)  shellfish (Unknown)      Meds:  acetaminophen   Tablet .. 650 milliGRAM(s) Oral every 6 hours PRN  acetaminophen  IVPB .. 1000 milliGRAM(s) IV Intermittent once  cefTRIAXone   IVPB      cefTRIAXone   IVPB 1 Gram(s) IV Intermittent every 24 hours  chlorhexidine 4% Liquid 1 Application(s) Topical two times a day  clopidogrel Tablet 75 milliGRAM(s) Oral daily  furosemide    Tablet 80 milliGRAM(s) Oral daily  heparin  Injectable 5000 Unit(s) SubCutaneous every 8 hours  hydrALAZINE 25 milliGRAM(s) Oral every 8 hours  insulin glargine Injectable (LANTUS) 8 Unit(s) SubCutaneous at bedtime  insulin lispro (HumaLOG) corrective regimen sliding scale   SubCutaneous Before meals and at bedtime  metoprolol tartrate 12.5 milliGRAM(s) Oral two times a day  ondansetron Injectable 4 milliGRAM(s) IV Push every 6 hours PRN  oxyCODONE    5 mG/acetaminophen 325 mG 1 Tablet(s) Oral every 4 hours PRN  pantoprazole    Tablet 40 milliGRAM(s) Oral before breakfast  QUEtiapine 200 milliGRAM(s) Oral daily  simvastatin 40 milliGRAM(s) Oral at bedtime      SOCIAL HISTORY:  Smoker:  YES / NO        PACK YEARS:                         WHEN QUIT?  ETOH use:  YES / NO               FREQUENCY / QUANTITY:  Ilicit Drug use:  YES / NO  Occupation:  Assisted device use (Cane / Walker):  Live with:    FAMILY HISTORY:  Family history of COPD (chronic obstructive pulmonary disease)  Family history of diabetes mellitus      VITALS:  Vital Signs Last 24 Hrs  T(C): 36.5 (03 May 2019 11:00), Max: 37 (03 May 2019 04:48)  T(F): 97.7 (03 May 2019 11:00), Max: 98.6 (03 May 2019 04:48)  HR: 84 (03 May 2019 11:00) (84 - 90)  BP: 160/86 (03 May 2019 11:00) (131/70 - 160/86)  BP(mean): --  RR: 18 (03 May 2019 11:00) (16 - 20)  SpO2: 100% (03 May 2019 11:00) (95% - 100%)    LABS/DIAGNOSTIC TESTS:                          9.5    6.82  )-----------( 295      ( 03 May 2019 12:18 )             30.9     WBC Count: 6.82 K/uL (05-03 @ 12:18)  WBC Count: 7.90 K/uL (05-02 @ 14:07)  WBC Count: 7.49 K/uL (05-02 @ 07:19)  WBC Count: 7.64 K/uL (05-01 @ 06:20)      05-03    134<L>  |  99  |  50<H>  ----------------------------<  156<H>  5.1   |  30  |  10.10<H>    Ca    8.4      03 May 2019 12:18  Phos  8.6     05-03  Mg     2.6     05-03    TPro  7.1  /  Alb  2.9<L>  /  TBili  0.3  /  DBili  x   /  AST  10  /  ALT  18  /  AlkPhos  86  05-02          LIVER FUNCTIONS - ( 02 May 2019 07:19 )  Alb: 2.9 g/dL / Pro: 7.1 g/dL / ALK PHOS: 86 U/L / ALT: 18 U/L DA / AST: 10 U/L / GGT: x             PT/INR - ( 02 May 2019 07:19 )   PT: 11.2 sec;   INR: 1.01 ratio         PTT - ( 02 May 2019 07:19 )  PTT:35.9 sec    LACTATE:    ABG -     CULTURES:   .Abscess  05-01 @ 18:29   Moderate Enterococcus species  --  --      .Urine  04-30 @ 10:56   No growth  --  --      .Blood  04-18 @ 16:48   No growth at 5 days.  --  --      .Blood  04-16 @ 11:10   Growth in anaerobic bottle: Coag Negative Staphylococcus  "Susceptibilities not performed"  "Due to technical problems, Proteus sp. will Not be reported as part of  the BCID panel until further notice"  ***Blood Panel PCR results on this specimen are available  approximately 3 hours after the Gram stain result.***  Gram stain, PCR, and/or culture results may not always  correspond due to difference in methodologies.  ************************************************************  This PCR assay was performed using [employer].  The following targets are tested for: Enterococcus,  vancomycin resistant enterococci, Listeria monocytogenes,  coagulase negative staphylococci, S. aureus,  methicillin resistant S. aureus, Streptococcus agalactiae  (Group B), S. pneumoniae, S. pyogenes (Group A),  Acinetobacter baumannii, Enterobacter cloacae, E. coli,  Klebsiella oxytoca, K. pneumoniae, Proteus sp.,  Serratia marcescens, Haemophilus influenzae,  Neisseria meningitidis, Pseudomonas aeruginosa, Candida  albicans, C. glabrata, C krusei, C parapsilosis,  C. tropicalis and the KPC resistance gene.  --  Blood Culture PCR      .Blood  04-10 @ 00:43   No growth at 5 days.  --  --      .Blood  03-27 @ 11:24   No growth at 5 days.  --  --      .Urine  03-01 @ 10:11   No growth  --  --          RADIOLOGY:      ROS  [  ] UNABLE TO ELICIT HPI:  39 Male with PMH of Type 2 DM, Migraine, ESRD on HD (left arm graft, M/W/F), Morbid obesity, HTN, Chr dyspnea (uses home O2 prn), Schizophrenia, Bacteremia due to foot cellulitis presented to hospital for shortness of breath and orthopnea after missing dialysis yesterday when he overslept. Pt feels like drowning in water mercado he lays flat. Symptoms are associated with subjective fevers and difficulty ambulation. Yesterday while taking shower he noticed painful Rt testicular nodule. No focal weakness, chest pain, abdominal pain. Despite being on dialysis, he still makes urine. Recurrent admissions in past due to non-compliance with dialysis.     History as above, he was found to have a small scrotal abscess or infected cyst over his right scrotal sac, he is s/p surgical drainage and has been sutured up. He has no fevers or chills.    FH: Sister has RA and Lupus, Father had Testicular mass  SH: Smokes, drinks alcohol and uses other drugs on weekends. Lives with family.       PAST MEDICAL & SURGICAL HISTORY:  Migraine  HTN (hypertension)  DM (diabetes mellitus)  Bipolar disorder  Schizophrenia  ESRD on dialysis  Morbid obesity with BMI of 40.0-44.9, adult  H/O hernia repair      aspirin (Short breath)  aspirin (Swelling)  penicillin (Short breath)  penicillins (Swelling)  shellfish (Unknown)      Meds:  acetaminophen   Tablet .. 650 milliGRAM(s) Oral every 6 hours PRN  acetaminophen  IVPB .. 1000 milliGRAM(s) IV Intermittent once  cefTRIAXone   IVPB      cefTRIAXone   IVPB 1 Gram(s) IV Intermittent every 24 hours  chlorhexidine 4% Liquid 1 Application(s) Topical two times a day  clopidogrel Tablet 75 milliGRAM(s) Oral daily  furosemide    Tablet 80 milliGRAM(s) Oral daily  heparin  Injectable 5000 Unit(s) SubCutaneous every 8 hours  hydrALAZINE 25 milliGRAM(s) Oral every 8 hours  insulin glargine Injectable (LANTUS) 8 Unit(s) SubCutaneous at bedtime  insulin lispro (HumaLOG) corrective regimen sliding scale   SubCutaneous Before meals and at bedtime  metoprolol tartrate 12.5 milliGRAM(s) Oral two times a day  ondansetron Injectable 4 milliGRAM(s) IV Push every 6 hours PRN  oxyCODONE    5 mG/acetaminophen 325 mG 1 Tablet(s) Oral every 4 hours PRN  pantoprazole    Tablet 40 milliGRAM(s) Oral before breakfast  QUEtiapine 200 milliGRAM(s) Oral daily  simvastatin 40 milliGRAM(s) Oral at bedtime        FAMILY HISTORY:  Family history of COPD (chronic obstructive pulmonary disease)  Family history of diabetes mellitus      VITALS:  Vital Signs Last 24 Hrs  T(C): 36.5 (03 May 2019 11:00), Max: 37 (03 May 2019 04:48)  T(F): 97.7 (03 May 2019 11:00), Max: 98.6 (03 May 2019 04:48)  HR: 84 (03 May 2019 11:00) (84 - 90)  BP: 160/86 (03 May 2019 11:00) (131/70 - 160/86)  BP(mean): --  RR: 18 (03 May 2019 11:00) (16 - 20)  SpO2: 100% (03 May 2019 11:00) (95% - 100%)    LABS/DIAGNOSTIC TESTS:                          9.5    6.82  )-----------( 295      ( 03 May 2019 12:18 )             30.9     WBC Count: 6.82 K/uL (05-03 @ 12:18)  WBC Count: 7.90 K/uL (05-02 @ 14:07)  WBC Count: 7.49 K/uL (05-02 @ 07:19)  WBC Count: 7.64 K/uL (05-01 @ 06:20)      05-03    134<L>  |  99  |  50<H>  ----------------------------<  156<H>  5.1   |  30  |  10.10<H>    Ca    8.4      03 May 2019 12:18  Phos  8.6     05-03  Mg     2.6     05-03    TPro  7.1  /  Alb  2.9<L>  /  TBili  0.3  /  DBili  x   /  AST  10  /  ALT  18  /  AlkPhos  86  05-02          LIVER FUNCTIONS - ( 02 May 2019 07:19 )  Alb: 2.9 g/dL / Pro: 7.1 g/dL / ALK PHOS: 86 U/L / ALT: 18 U/L DA / AST: 10 U/L / GGT: x             PT/INR - ( 02 May 2019 07:19 )   PT: 11.2 sec;   INR: 1.01 ratio         PTT - ( 02 May 2019 07:19 )  PTT:35.9 sec    LACTATE:    ABG -     CULTURES:   .Abscess  05-01 @ 18:29   Moderate Enterococcus species  --  --      .Urine  04-30 @ 10:56   No growth  --  --      .Blood  04-18 @ 16:48   No growth at 5 days.  --  --              RADIOLOGY:< from: Xray Chest 1 View-PORTABLE IMMEDIATE (04.30.19 @ 01:39) >  EXAM:  XR CHEST PORTABLE IMMED 1V                            PROCEDURE DATE:  04/30/2019          INTERPRETATION:  Portable chest x-ray    Indication: Dyspnea.    Portable chest x-ray is compared to a previous examination dated portable   20/7/2019.    Impression: The examination is limited by patient's large body habitus.   No gross stable pulmonary vascular congestion; underlying pulmonary   infiltrates/pneumonia cannot be excluded.    No evidence for pleural effusion or pneumothorax.    The trachea is midline.    Stable cardiac silhouette.        < end of copied text >        ROS  [  ] UNABLE TO ELICIT

## 2019-05-03 NOTE — PROGRESS NOTE ADULT - SUBJECTIVE AND OBJECTIVE BOX
CHIEF COMPLAINT:Patient is a 39y old  Male who presents with a chief complaint of Shortness of breath and leg swelling (02 May 2019 23:05)    	  REVIEW OF SYSTEMS:  CONSTITUTIONAL: No fever, weight loss, or fatigue  EYES: No eye pain, visual disturbances, or discharge  ENT:  No difficulty hearing, tinnitus, vertigo; No sinus or throat pain  NECK: No pain or stiffness  RESPIRATORY: No cough, wheezing, chills or hemoptysis; No Shortness of Breath  CARDIOVASCULAR: No chest pain, palpitations, passing out, dizziness, or leg swelling  GASTROINTESTINAL: No abdominal or epigastric pain. No nausea, vomiting, or hematemesis; No diarrhea or constipation. No melena or hematochezia.  GENITOURINARY: No dysuria, frequency, hematuria, or incontinence  NEUROLOGICAL: No headaches, memory loss, loss of strength, numbness, or tremors  SKIN: No itching, burning, rashes, or lesions   LYMPH Nodes: No enlarged glands  ENDOCRINE: No heat or cold intolerance; No hair loss  MUSCULOSKELETAL: No joint pain or swelling; No muscle, back, or extremity pain  PSYCHIATRIC: No depression, anxiety, mood swings, or difficulty sleeping  HEME/LYMPH: No easy bruising, or bleeding gums  ALLERGY AND IMMUNOLOGIC: No hives or eczema	    [ ] All others negative	  [ ] Unable to obtain    PHYSICAL EXAM:  T(C): 36.5 (05-03-19 @ 11:00), Max: 37 (05-03-19 @ 04:48)  HR: 84 (05-03-19 @ 11:00) (81 - 93)  BP: 160/86 (05-03-19 @ 11:00) (115/80 - 160/86)  RR: 18 (05-03-19 @ 11:00) (12 - 20)  SpO2: 100% (05-03-19 @ 11:00) (95% - 100%)  Wt(kg): --  I&O's Summary    02 May 2019 07:01  -  03 May 2019 07:00  --------------------------------------------------------  IN: 0 mL / OUT: 950 mL / NET: -950 mL        Appearance: Normal	  HEENT:   Normal oral mucosa, PERRL, EOMI	  Lymphatic: No lymphadenopathy  Cardiovascular: Normal S1 S2, No JVD, No murmurs, No edema  Respiratory: Lungs clear to auscultation	  Psychiatry: A & O x 3, Mood & affect appropriate  Gastrointestinal:  Soft, Non-tender, + BS	  Skin: No rashes, No ecchymoses, No cyanosis	  Neurologic: Non-focal  Extremities: Normal range of motion, No clubbing, cyanosis or edema  Vascular: Peripheral pulses palpable 2+ bilaterally    MEDICATIONS  (STANDING):  acetaminophen  IVPB .. 1000 milliGRAM(s) IV Intermittent once  cefTRIAXone   IVPB      cefTRIAXone   IVPB 1 Gram(s) IV Intermittent every 24 hours  chlorhexidine 4% Liquid 1 Application(s) Topical two times a day  clopidogrel Tablet 75 milliGRAM(s) Oral daily  furosemide    Tablet 80 milliGRAM(s) Oral daily  heparin  Injectable 5000 Unit(s) SubCutaneous every 8 hours  hydrALAZINE 10 milliGRAM(s) Oral three times a day  insulin glargine Injectable (LANTUS) 8 Unit(s) SubCutaneous at bedtime  insulin lispro (HumaLOG) corrective regimen sliding scale   SubCutaneous Before meals and at bedtime  pantoprazole    Tablet 40 milliGRAM(s) Oral before breakfast  QUEtiapine 200 milliGRAM(s) Oral daily  simvastatin 40 milliGRAM(s) Oral at bedtime      TELEMETRY: 6 beats of WCT overnight	    	  LABS:	 	    CARDIAC MARKERS:  CARDIAC MARKERS ( 02 May 2019 14:07 )  0.032 ng/mL / x     / 82 U/L / x     / 1.2 ng/mL                                9.2    7.90  )-----------( 311      ( 02 May 2019 14:07 )             29.7     05-02    136  |  102  |  67<H>  ----------------------------<  99  6.5<HH>   |  24  |  11.90<H>    Ca    8.0<L>      02 May 2019 14:07  Mg     2.5     05-02    TPro  7.1  /  Alb  2.9<L>  /  TBili  0.3  /  DBili  x   /  AST  10  /  ALT  18  /  AlkPhos  86  05-02    proBNP: Serum Pro-Brain Natriuretic Peptide: 38861 pg/mL (04-30 @ 01:48)  Serum Pro-Brain Natriuretic Peptide: 6637 pg/mL (04-16 @ 06:19)  Serum Pro-Brain Natriuretic Peptide: 8216 pg/mL (04-09 @ 17:36)    Lipid Profile:   HgA1c:   TSH:

## 2019-05-05 LAB
CULTURE RESULTS: SIGNIFICANT CHANGE UP
SPECIMEN SOURCE: SIGNIFICANT CHANGE UP

## 2019-05-06 LAB
CULTURE RESULTS: SIGNIFICANT CHANGE UP
ORGANISM # SPEC MICROSCOPIC CNT: SIGNIFICANT CHANGE UP
ORGANISM # SPEC MICROSCOPIC CNT: SIGNIFICANT CHANGE UP
SPECIMEN SOURCE: SIGNIFICANT CHANGE UP
SURGICAL PATHOLOGY STUDY: SIGNIFICANT CHANGE UP

## 2019-05-07 LAB — DRUG SCREEN, SERUM: SIGNIFICANT CHANGE UP

## 2019-05-08 ENCOUNTER — APPOINTMENT (OUTPATIENT)
Dept: PULMONOLOGY | Facility: CLINIC | Age: 39
End: 2019-05-08
Payer: MEDICARE

## 2019-05-08 VITALS
SYSTOLIC BLOOD PRESSURE: 143 MMHG | WEIGHT: 315 LBS | HEART RATE: 99 BPM | OXYGEN SATURATION: 98 % | DIASTOLIC BLOOD PRESSURE: 96 MMHG | BODY MASS INDEX: 36.45 KG/M2 | HEIGHT: 78 IN | TEMPERATURE: 98.1 F

## 2019-05-08 DIAGNOSIS — R06.2 WHEEZING: ICD-10-CM

## 2019-05-08 PROCEDURE — 99213 OFFICE O/P EST LOW 20 MIN: CPT | Mod: 25

## 2019-05-08 PROCEDURE — 94010 BREATHING CAPACITY TEST: CPT

## 2019-05-08 PROCEDURE — 94729 DIFFUSING CAPACITY: CPT

## 2019-05-08 PROCEDURE — 94726 PLETHYSMOGRAPHY LUNG VOLUMES: CPT

## 2019-05-09 NOTE — PHYSICAL EXAM
[General Appearance - Well Developed] : well developed [Normal Appearance] : normal appearance [Well Groomed] : well groomed [General Appearance - Well Nourished] : well nourished [No Deformities] : no deformities [General Appearance - In No Acute Distress] : no acute distress [Normal Oropharynx] : normal oropharynx [IV] : IV [Neck Appearance] : the appearance of the neck was normal [Neck Cervical Mass (___cm)] : no neck mass was observed [Jugular Venous Distention Increased] : there was no jugular-venous distention [Heart Rate And Rhythm] : heart rate and rhythm were normal [Heart Sounds] : normal S1 and S2 [Murmurs] : no murmurs present [FreeTextEntry1] : + Bilateral ankle edema [Arterial Pulses Normal] : the arterial pulses were normal [Respiration, Rhythm And Depth] : normal respiratory rhythm and effort [Exaggerated Use Of Accessory Muscles For Inspiration] : no accessory muscle use [Auscultation Breath Sounds / Voice Sounds] : lungs were clear to auscultation bilaterally [Bowel Sounds] : normal bowel sounds [Abdomen Soft] : soft [Abdomen Tenderness] : non-tender [] : no hepato-splenomegaly [Abnormal Walk] : normal gait [Nail Clubbing] : no clubbing of the fingernails [Cyanosis, Localized] : no localized cyanosis [Skin Color & Pigmentation] : normal skin color and pigmentation [No Focal Deficits] : no focal deficits [Oriented To Time, Place, And Person] : oriented to person, place, and time [Impaired Insight] : insight and judgment were intact

## 2019-05-09 NOTE — ASSESSMENT
[FreeTextEntry1] : 1) Dyspnea\par 2) Snoring and daytime somnolence\par 3) ESRD\par 4) Diastolic dysfunction\par 5) HTN\par 6) DM\par 7) Morbid obesity- BMI-40

## 2019-05-09 NOTE — REVIEW OF SYSTEMS
[Dyspnea] : dyspnea [Hypertension] : ~T hypertension [Diabetes] : diabetes mellitus [Edema] : ~T edema was present [Difficulty Maintaining Sleep] : difficulty maintaining sleep [Snoring] : snoring [Nonrestorative Sleep] : nonrestorative sleep [Hypersomnolence] : sleeping much more than usual [Negative] : Psychiatric

## 2019-05-12 ENCOUNTER — INPATIENT (INPATIENT)
Facility: HOSPITAL | Age: 39
LOS: 2 days | Discharge: HOME | DRG: 602 | End: 2019-05-15
Attending: INTERNAL MEDICINE | Admitting: INTERNAL MEDICINE
Payer: MEDICARE

## 2019-05-12 VITALS
TEMPERATURE: 98 F | DIASTOLIC BLOOD PRESSURE: 87 MMHG | SYSTOLIC BLOOD PRESSURE: 134 MMHG | RESPIRATION RATE: 16 BRPM | HEART RATE: 87 BPM | WEIGHT: 315 LBS | HEIGHT: 78 IN | OXYGEN SATURATION: 100 %

## 2019-05-12 DIAGNOSIS — Z98.890 OTHER SPECIFIED POSTPROCEDURAL STATES: Chronic | ICD-10-CM

## 2019-05-12 NOTE — ED PROVIDER NOTE - PROGRESS NOTE DETAILS
patient states his pain is too severe to ambulate. States that given this pain he will not be able to go to dialysis and do ADLs. Patient states at home he is not able to ambulate and only has his elderly mother to help him. Plan. admit for possible rehab placment. concern that patient is unreliable given history of missing dialysis and not following up as instructed in past. patient states his pain is too much to ambulate. States that given this pain he will not be able to go to dialysis and do ADLs. Patient states at home he is not able to ambulate and only has his elderly mother to help him. Plan. admit for possible rehab placement. concern that patient is unreliable given history of missing dialysis and not following up as instructed in past.

## 2019-05-12 NOTE — ED ADULT NURSE NOTE - ED STAT RN HANDOFF DETAILS
pt.remained  stable.  denies pain. not in distress pt.remained  stable.  denies pain. not in distress.endorsed to yisel santos.chiquita hunter

## 2019-05-12 NOTE — ED PROVIDER NOTE - CARDIAC, MLM
Normal rate, regular rhythm.  Heart sounds S1, S2.  No murmurs, rubs or gallops. Left thigh questionably larger than right with pitting edema to bilateral thighs (L>R).

## 2019-05-12 NOTE — ED PROVIDER NOTE - OBJECTIVE STATEMENT
40 y/o morbidly obese male with PMHx of HTN, DM, and ESRD (on dialysis; last dialysis Friday) presents to the ED with c/o left leg pain and swelling since this afternoon. Pt states that she was walking to a [employer] with his mother; after walking about 10 minutes he sat down and felt his left leg "blow up" and become tense and painful. Pt states that he has chronic SOB and can only sleep while sitting up; these Sx have been unchanged.

## 2019-05-13 DIAGNOSIS — F20.9 SCHIZOPHRENIA, UNSPECIFIED: ICD-10-CM

## 2019-05-13 DIAGNOSIS — L03.90 CELLULITIS, UNSPECIFIED: ICD-10-CM

## 2019-05-13 DIAGNOSIS — Z29.9 ENCOUNTER FOR PROPHYLACTIC MEASURES, UNSPECIFIED: ICD-10-CM

## 2019-05-13 DIAGNOSIS — I10 ESSENTIAL (PRIMARY) HYPERTENSION: ICD-10-CM

## 2019-05-13 DIAGNOSIS — E11.9 TYPE 2 DIABETES MELLITUS WITHOUT COMPLICATIONS: ICD-10-CM

## 2019-05-13 DIAGNOSIS — N18.6 END STAGE RENAL DISEASE: ICD-10-CM

## 2019-05-13 DIAGNOSIS — M79.89 OTHER SPECIFIED SOFT TISSUE DISORDERS: ICD-10-CM

## 2019-05-13 LAB
ALBUMIN SERPL ELPH-MCNC: 3.3 G/DL — LOW (ref 3.5–5)
ALP SERPL-CCNC: 113 U/L — SIGNIFICANT CHANGE UP (ref 40–120)
ALT FLD-CCNC: 34 U/L DA — SIGNIFICANT CHANGE UP (ref 10–60)
ANION GAP SERPL CALC-SCNC: 10 MMOL/L — SIGNIFICANT CHANGE UP (ref 5–17)
AST SERPL-CCNC: 21 U/L — SIGNIFICANT CHANGE UP (ref 10–40)
BASOPHILS # BLD AUTO: 0.05 K/UL — SIGNIFICANT CHANGE UP (ref 0–0.2)
BASOPHILS NFR BLD AUTO: 0.5 % — SIGNIFICANT CHANGE UP (ref 0–2)
BILIRUB SERPL-MCNC: 0.3 MG/DL — SIGNIFICANT CHANGE UP (ref 0.2–1.2)
BUN SERPL-MCNC: 65 MG/DL — HIGH (ref 7–18)
CALCIUM SERPL-MCNC: 8 MG/DL — LOW (ref 8.4–10.5)
CHLORIDE SERPL-SCNC: 96 MMOL/L — SIGNIFICANT CHANGE UP (ref 96–108)
CHOLEST SERPL-MCNC: 149 MG/DL — SIGNIFICANT CHANGE UP (ref 10–199)
CO2 SERPL-SCNC: 30 MMOL/L — SIGNIFICANT CHANGE UP (ref 22–31)
CREAT SERPL-MCNC: 11.2 MG/DL — HIGH (ref 0.5–1.3)
EOSINOPHIL # BLD AUTO: 0.49 K/UL — SIGNIFICANT CHANGE UP (ref 0–0.5)
EOSINOPHIL NFR BLD AUTO: 4.7 % — SIGNIFICANT CHANGE UP (ref 0–6)
FOLATE SERPL-MCNC: 10.4 NG/ML — SIGNIFICANT CHANGE UP
GLUCOSE BLDC GLUCOMTR-MCNC: 106 MG/DL — HIGH (ref 70–99)
GLUCOSE BLDC GLUCOMTR-MCNC: 123 MG/DL — HIGH (ref 70–99)
GLUCOSE BLDC GLUCOMTR-MCNC: 143 MG/DL — HIGH (ref 70–99)
GLUCOSE BLDC GLUCOMTR-MCNC: 158 MG/DL — HIGH (ref 70–99)
GLUCOSE SERPL-MCNC: 100 MG/DL — HIGH (ref 70–99)
HBA1C BLD-MCNC: 5.7 % — HIGH (ref 4–5.6)
HCT VFR BLD CALC: 29.3 % — LOW (ref 39–50)
HDLC SERPL-MCNC: 51 MG/DL — SIGNIFICANT CHANGE UP
HGB BLD-MCNC: 8.9 G/DL — LOW (ref 13–17)
IMM GRANULOCYTES NFR BLD AUTO: 0.2 % — SIGNIFICANT CHANGE UP (ref 0–1.5)
LIPID PNL WITH DIRECT LDL SERPL: 75 MG/DL — SIGNIFICANT CHANGE UP
LYMPHOCYTES # BLD AUTO: 1.86 K/UL — SIGNIFICANT CHANGE UP (ref 1–3.3)
LYMPHOCYTES # BLD AUTO: 17.7 % — SIGNIFICANT CHANGE UP (ref 13–44)
MCHC RBC-ENTMCNC: 30.3 PG — SIGNIFICANT CHANGE UP (ref 27–34)
MCHC RBC-ENTMCNC: 30.4 GM/DL — LOW (ref 32–36)
MCV RBC AUTO: 99.7 FL — SIGNIFICANT CHANGE UP (ref 80–100)
MONOCYTES # BLD AUTO: 0.78 K/UL — SIGNIFICANT CHANGE UP (ref 0–0.9)
MONOCYTES NFR BLD AUTO: 7.4 % — SIGNIFICANT CHANGE UP (ref 2–14)
NEUTROPHILS # BLD AUTO: 7.33 K/UL — SIGNIFICANT CHANGE UP (ref 1.8–7.4)
NEUTROPHILS NFR BLD AUTO: 69.5 % — SIGNIFICANT CHANGE UP (ref 43–77)
NRBC # BLD: 0 /100 WBCS — SIGNIFICANT CHANGE UP (ref 0–0)
PLATELET # BLD AUTO: 300 K/UL — SIGNIFICANT CHANGE UP (ref 150–400)
POTASSIUM SERPL-MCNC: 5.1 MMOL/L — SIGNIFICANT CHANGE UP (ref 3.5–5.3)
POTASSIUM SERPL-SCNC: 5.1 MMOL/L — SIGNIFICANT CHANGE UP (ref 3.5–5.3)
PROT SERPL-MCNC: 7.6 G/DL — SIGNIFICANT CHANGE UP (ref 6–8.3)
RBC # BLD: 2.94 M/UL — LOW (ref 4.2–5.8)
RBC # FLD: 15.2 % — HIGH (ref 10.3–14.5)
SODIUM SERPL-SCNC: 136 MMOL/L — SIGNIFICANT CHANGE UP (ref 135–145)
TOTAL CHOLESTEROL/HDL RATIO MEASUREMENT: 2.9 RATIO — LOW (ref 3.4–9.6)
TRIGL SERPL-MCNC: 113 MG/DL — SIGNIFICANT CHANGE UP (ref 10–149)
TSH SERPL-MCNC: 1.66 UU/ML — SIGNIFICANT CHANGE UP (ref 0.34–4.82)
VIT B12 SERPL-MCNC: 781 PG/ML — SIGNIFICANT CHANGE UP (ref 232–1245)
WBC # BLD: 10.53 K/UL — HIGH (ref 3.8–10.5)
WBC # FLD AUTO: 10.53 K/UL — HIGH (ref 3.8–10.5)

## 2019-05-13 PROCEDURE — 73701 CT LOWER EXTREMITY W/DYE: CPT | Mod: 26,LT

## 2019-05-13 PROCEDURE — 73562 X-RAY EXAM OF KNEE 3: CPT | Mod: 26,LT

## 2019-05-13 PROCEDURE — 99285 EMERGENCY DEPT VISIT HI MDM: CPT

## 2019-05-13 RX ORDER — SIMVASTATIN 20 MG/1
40 TABLET, FILM COATED ORAL AT BEDTIME
Refills: 0 | Status: DISCONTINUED | OUTPATIENT
Start: 2019-05-13 | End: 2019-05-15

## 2019-05-13 RX ORDER — METOPROLOL TARTRATE 50 MG
12.5 TABLET ORAL
Refills: 0 | Status: DISCONTINUED | OUTPATIENT
Start: 2019-05-13 | End: 2019-05-15

## 2019-05-13 RX ORDER — INSULIN GLARGINE 100 [IU]/ML
8 INJECTION, SOLUTION SUBCUTANEOUS AT BEDTIME
Refills: 0 | Status: DISCONTINUED | OUTPATIENT
Start: 2019-05-13 | End: 2019-05-15

## 2019-05-13 RX ORDER — HEPARIN SODIUM 5000 [USP'U]/ML
5000 INJECTION INTRAVENOUS; SUBCUTANEOUS EVERY 12 HOURS
Refills: 0 | Status: DISCONTINUED | OUTPATIENT
Start: 2019-05-13 | End: 2019-05-15

## 2019-05-13 RX ORDER — PANTOPRAZOLE SODIUM 20 MG/1
40 TABLET, DELAYED RELEASE ORAL
Refills: 0 | Status: DISCONTINUED | OUTPATIENT
Start: 2019-05-13 | End: 2019-05-15

## 2019-05-13 RX ORDER — ACETAMINOPHEN 500 MG
650 TABLET ORAL EVERY 6 HOURS
Refills: 0 | Status: DISCONTINUED | OUTPATIENT
Start: 2019-05-13 | End: 2019-05-15

## 2019-05-13 RX ORDER — QUETIAPINE FUMARATE 200 MG/1
200 TABLET, FILM COATED ORAL DAILY
Refills: 0 | Status: DISCONTINUED | OUTPATIENT
Start: 2019-05-13 | End: 2019-05-15

## 2019-05-13 RX ORDER — CLOPIDOGREL BISULFATE 75 MG/1
75 TABLET, FILM COATED ORAL DAILY
Refills: 0 | Status: DISCONTINUED | OUTPATIENT
Start: 2019-05-13 | End: 2019-05-15

## 2019-05-13 RX ORDER — LINEZOLID 600 MG/300ML
600 INJECTION, SOLUTION INTRAVENOUS EVERY 12 HOURS
Refills: 0 | Status: DISCONTINUED | OUTPATIENT
Start: 2019-05-13 | End: 2019-05-15

## 2019-05-13 RX ORDER — HYDRALAZINE HCL 50 MG
10 TABLET ORAL THREE TIMES A DAY
Refills: 0 | Status: DISCONTINUED | OUTPATIENT
Start: 2019-05-13 | End: 2019-05-15

## 2019-05-13 RX ORDER — HYDROMORPHONE HYDROCHLORIDE 2 MG/ML
0.5 INJECTION INTRAMUSCULAR; INTRAVENOUS; SUBCUTANEOUS ONCE
Refills: 0 | Status: DISCONTINUED | OUTPATIENT
Start: 2019-05-13 | End: 2019-05-13

## 2019-05-13 RX ORDER — INSULIN LISPRO 100/ML
VIAL (ML) SUBCUTANEOUS
Refills: 0 | Status: DISCONTINUED | OUTPATIENT
Start: 2019-05-13 | End: 2019-05-15

## 2019-05-13 RX ORDER — FUROSEMIDE 40 MG
80 TABLET ORAL DAILY
Refills: 0 | Status: DISCONTINUED | OUTPATIENT
Start: 2019-05-13 | End: 2019-05-15

## 2019-05-13 RX ORDER — ERYTHROPOIETIN 10000 [IU]/ML
10000 INJECTION, SOLUTION INTRAVENOUS; SUBCUTANEOUS
Refills: 0 | Status: DISCONTINUED | OUTPATIENT
Start: 2019-05-13 | End: 2019-05-15

## 2019-05-13 RX ADMIN — HYDROMORPHONE HYDROCHLORIDE 0.5 MILLIGRAM(S): 2 INJECTION INTRAMUSCULAR; INTRAVENOUS; SUBCUTANEOUS at 11:09

## 2019-05-13 RX ADMIN — HEPARIN SODIUM 5000 UNIT(S): 5000 INJECTION INTRAVENOUS; SUBCUTANEOUS at 17:14

## 2019-05-13 RX ADMIN — CLOPIDOGREL BISULFATE 75 MILLIGRAM(S): 75 TABLET, FILM COATED ORAL at 11:06

## 2019-05-13 RX ADMIN — Medication 80 MILLIGRAM(S): at 11:06

## 2019-05-13 RX ADMIN — Medication 1: at 12:24

## 2019-05-13 RX ADMIN — HYDROMORPHONE HYDROCHLORIDE 0.5 MILLIGRAM(S): 2 INJECTION INTRAMUSCULAR; INTRAVENOUS; SUBCUTANEOUS at 10:40

## 2019-05-13 RX ADMIN — Medication 10 MILLIGRAM(S): at 13:26

## 2019-05-13 RX ADMIN — QUETIAPINE FUMARATE 200 MILLIGRAM(S): 200 TABLET, FILM COATED ORAL at 11:07

## 2019-05-13 RX ADMIN — LINEZOLID 300 MILLIGRAM(S): 600 INJECTION, SOLUTION INTRAVENOUS at 17:14

## 2019-05-13 RX ADMIN — Medication 12.5 MILLIGRAM(S): at 17:14

## 2019-05-13 RX ADMIN — ERYTHROPOIETIN 10000 UNIT(S): 10000 INJECTION, SOLUTION INTRAVENOUS; SUBCUTANEOUS at 22:26

## 2019-05-13 NOTE — CHART NOTE - NSCHARTNOTEFT_GEN_A_CORE
HPI:  39 Male with PMH of Type 2 DM, Migraine, ESRD on HD (left arm graft, M/W/F), Morbid obesity, HTN, Chr dyspnea (uses home O2 prn), Schizophrenia, Bacteremia due to foot cellulitis presented to ED c/o left leg pain and swelling since yesterday afternoon. Urology called for wound check of a recently debrided scrotal wound.    wound is clean and closed with chromic sutures that will dissolve. there is no skin changes to the area or any sign of recurrent abscess. wound is slightly masserated and is not being cleaned properly.  would recommend dry dressing to the area, to keep it nice and dry. daily showering and scrotal elevation while in bed.  D/w Dr Beckford and agrees

## 2019-05-13 NOTE — H&P ADULT - NSICDXPASTMEDICALHX_GEN_ALL_CORE_FT
PAST MEDICAL HISTORY:  Bipolar disorder     DM (diabetes mellitus)     ESRD on dialysis     HTN (hypertension)     Migraine     Morbid obesity with BMI of 40.0-44.9, adult     Schizophrenia

## 2019-05-13 NOTE — H&P ADULT - ASSESSMENT
39 Male with PMH of Type 2 DM, Migraine, ESRD on HD (left arm graft, M/W/F), Morbid obesity, HTN, Chr dyspnea (uses home O2 prn), Schizophrenia, Bacteremia due to foot cellulitis presented to ED c/o left leg pain and swelling since yesterday afternoon. Pt states that she was walking to a [employer] with his mother; after walking about 10 minutes he sat down and felt his left thigh "blow up" and become tense and painful. ED course, pt vitals stable, labs noted wbc 10.5k, BUN/Cr 65/11.2, last HD on Friday. Admit to medicine for Lt thigh cellulitis.

## 2019-05-13 NOTE — CONSULT NOTE ADULT - RS GEN PE MLT RESP DETAILS PC
breath sounds equal/no rhonchi/no wheezes/no rales/good air movement/clear to auscultation bilaterally

## 2019-05-13 NOTE — H&P ADULT - HISTORY OF PRESENT ILLNESS
39 Male with PMH of Type 2 DM, Migraine, ESRD on HD (left arm graft, M/W/F), Morbid obesity, HTN, Chr dyspnea (uses home O2 prn), Schizophrenia, Bacteremia due to foot cellulitis presented to ED c/o left leg pain and swelling since yesterday afternoon. Pt states that she was walking to a Spiritism with his mother; after walking about 10 minutes he sat down and felt his left leg "blow up" and become tense and painful. Pt states that he has chronic SOB and can only sleep while sitting up; these Sx have been unchanged. [facility]/p excision) and Infected Cyst/very small Abscess of right side of scrotum completed zyvox 600mg bid for 5 days). Pt denies fever, chill, cp, abd pain, n/v/c/d or other complains.     ED course, pt vitals stable, labs noted wbc 10.5k, BUN/Cr 65/11.2, last HD on Friday.     FH: Sister has RA and Lupus, Father had Testicular mass  SH: Smokes, drinks alcohol and uses other drugs on weekends. Lives with family. 39 Male with PMH of Type 2 DM, Migraine, ESRD on HD (left arm graft, M/W/F), Morbid obesity, HTN, Chr dyspnea (uses home O2 prn), Schizophrenia, Bacteremia due to foot cellulitis presented to ED c/o left leg pain and swelling since yesterday afternoon. Pt states that she was walking to a [employer] with his mother; after walking about 10 minutes he sat down and felt his left thigh "blow up" and become tense and painful. Pt states that he has chronic SOB and can only sleep while sitting up; these Sx have been unchanged. Pt was recently admitted in [facility] for rt scrotal mass (s/p excision) and Infected Cyst/very small Abscess of right side of scrotum (completed zyvox for 3 days, total 5 days). Pt denies fever, chill, cp, abd pain, n/v/c/d or other complains.     ED course, pt vitals stable, labs noted wbc 10.5k, BUN/Cr 65/11.2, last HD on Friday.     FH: Sister has RA and Lupus, Father had Testicular mass  SH: Smokes, drinks alcohol and uses other drugs on weekends. Lives with family.

## 2019-05-13 NOTE — H&P ADULT - ATTENDING COMMENTS
came with lt thigh and knee pain from a few days ,  no fall. no fever or chills ,  seen and examined  vsstable afebrile  alert awake not in any distress.   physical done  rt thigh lower some fullness  ,  swelling  warm    will get ct thigh  surgery, antibs, blood cxs , d/w nephro  as per after ct thigh can go for HD.  s/p scrotal abcess drain   urology to follow  htn, dm, morbid obesity, possible SAS. came with lt thigh and knee pain from a few days ,  no fall. no fever or chills ,  seen and examined  vsstable afebrile  alert awake not in any distress.   physical done  rt thigh lower some fullness  ,  swelling  warm    will get ct thigh  surgery, antibs, blood cxs , d/w nephro  as per after ct thigh can go for HD.  s/p scrotal abcess drain   urology to follow  htn, dm, morbid obesity, possible SAS.  d/w surgery

## 2019-05-13 NOTE — CONSULT NOTE ADULT - SUBJECTIVE AND OBJECTIVE BOX
HPI:  39 Male with PMH of Type 2 DM, Migraine, ESRD on HD (left arm graft, M/W/F), Morbid obesity, HTN, Chr dyspnea (uses home O2 prn), Schizophrenia, Bacteremia due to foot cellulitis presented to ED c/o left leg pain and swelling since yesterday afternoon. Pt states that she was walking to a [employer] with his mother; after walking about 10 minutes he sat down and felt his left thigh "blow up" and become tense and painful. Pt states that he has chronic SOB and can only sleep while sitting up; these Sx have been unchanged. Pt was recently admitted in [facility] for rt scrotal mass (s/p excision) and Infected Cyst/very small Abscess of right side of scrotum (completed zyvox for 3 days, total 5 days). Pt denies fever, chill, cp, abd pain, n/v/c/d or other complains.     ED course, pt vitals stable, labs noted wbc 10.5k, BUN/Cr 65/11.2, last HD on Friday.     FH: Sister has RA and Lupus, Father had Testicular mass  SH: Smokes, drinks alcohol and uses other drugs on weekends. Lives with family. (13 May 2019 08:15)      PAST MEDICAL & SURGICAL HISTORY:  Migraine  HTN (hypertension)  DM (diabetes mellitus)  Bipolar disorder  Schizophrenia  ESRD on dialysis  Morbid obesity with BMI of 40.0-44.9, adult  H/O hernia repair      aspirin (Short breath)  aspirin (Swelling)  penicillin (Short breath)  penicillins (Swelling)  shellfish (Unknown)      Meds:  acetaminophen   Tablet .. 650 milliGRAM(s) Oral every 6 hours PRN  clopidogrel Tablet 75 milliGRAM(s) Oral daily  epoetin cyndie Injectable 44071 Unit(s) IV Push <User Schedule>  furosemide    Tablet 80 milliGRAM(s) Oral daily  heparin  Injectable 5000 Unit(s) SubCutaneous every 12 hours  hydrALAZINE 10 milliGRAM(s) Oral three times a day  insulin glargine Injectable (LANTUS) 8 Unit(s) SubCutaneous at bedtime  insulin lispro (HumaLOG) corrective regimen sliding scale   SubCutaneous Before meals and at bedtime  linezolid  IVPB 600 milliGRAM(s) IV Intermittent every 12 hours  metoprolol tartrate 12.5 milliGRAM(s) Oral two times a day  pantoprazole    Tablet 40 milliGRAM(s) Oral before breakfast  QUEtiapine 200 milliGRAM(s) Oral daily  simvastatin 40 milliGRAM(s) Oral at bedtime      SOCIAL HISTORY:  Smoker:  YES / NO        PACK YEARS:                         WHEN QUIT?  ETOH use:  YES / NO               FREQUENCY / QUANTITY:  Ilicit Drug use:  YES / NO  Occupation:  Assisted device use (Cane / Walker):  Live with:    FAMILY HISTORY:  Family history of COPD (chronic obstructive pulmonary disease)  Family history of diabetes mellitus      VITALS:  Vital Signs Last 24 Hrs  T(C): 36.8 (13 May 2019 15:15), Max: 37.1 (13 May 2019 07:38)  T(F): 98.3 (13 May 2019 15:15), Max: 98.8 (13 May 2019 07:38)  HR: 87 (13 May 2019 15:15) (80 - 87)  BP: 147/85 (13 May 2019 15:15) (130/78 - 147/85)  BP(mean): --  RR: 18 (13 May 2019 15:15) (16 - 18)  SpO2: 100% (13 May 2019 15:15) (100% - 100%)    LABS/DIAGNOSTIC TESTS:                          8.9    10.53 )-----------( 300      ( 13 May 2019 02:41 )             29.3     WBC Count: 10.53 K/uL (05-13 @ 02:41)      05-13    136  |  96  |  65<H>  ----------------------------<  100<H>  5.1   |  30  |  11.20<H>    Ca    8.0<L>      13 May 2019 02:41    TPro  7.6  /  Alb  3.3<L>  /  TBili  0.3  /  DBili  x   /  AST  21  /  ALT  34  /  AlkPhos  113  05-13          LIVER FUNCTIONS - ( 13 May 2019 02:41 )  Alb: 3.3 g/dL / Pro: 7.6 g/dL / ALK PHOS: 113 U/L / ALT: 34 U/L DA / AST: 21 U/L / GGT: x                 LACTATE:    ABG -     CULTURES:   .Abscess  05-01 @ 18:29   Moderate Enterococcus faecalis (vancomycin resistant)  Numerous Corynebacterium species "Susceptibilities not performed"  Few Coag Negative Staphylococcus "Susceptibilities not performed"  --  Enterococcus faecalis (vancomycin resistant)                RADIOLOGY:< from: CT Lower Extremity w/ IV Cont, Left (05.13.19 @ 13:02) >  EXAM:  CT LWR EXT IC LT                            PROCEDURE DATE:  05/13/2019          INTERPRETATION:  EXAMINATION: CT of the left lower extremity with contrast    CLINICAL INFORMATION: Left lower extremity swelling. Evaluate for abscess    TECHNIQUE: Axial CT images were obtained through the left lower extremity   from the level of the hip to the level of the knee. Coronal and sagittal   reformatted images were made. Images were obtained after the intravenous   administration of 90 mL of Omnipaque 350. 10 mL were discarded    FINDINGS: There is nonspecific infiltration of the subcutaneous tissues   circumferentially about the lower extremity, most pronounced at the level   of the mid to distal femur, consistent with cellulitis in the appropriate   clinical setting. No well-formed/drainable collections are demonstrated.   No acute fracture or dislocation is demonstrated. There are no areas of   cortical destruction or periosteal reaction to suggest osteomyelitis. The   hip and knee joint spaces are maintained. There are nonspecific prominent   inguinal lymph nodes.    IMPRESSION: Nonspecific infiltration of the subcutaneous tissues of the   left lower extremity, most pronounced at the level of the mid to distal   femur, consistentwith cellulitis, in the appropriate clinical setting.   No well-formed/drainable collections are demonstrated.        < end of copied text >        ROS  [  ] UNABLE TO ELICIT HPI:  39 Male with PMH of Type 2 DM, Migraine, ESRD on HD (left arm graft, M/W/F), Morbid obesity, HTN, Chr dyspnea (uses home O2 prn), Schizophrenia, Bacteremia due to foot cellulitis presented to ED c/o left leg pain and swelling since yesterday afternoon. Pt states that she was walking to a [employer] with his mother; after walking about 10 minutes he sat down and felt his left thigh "blow up" and become tense and painful. Pt states that he has chronic SOB and can only sleep while sitting up; these Sx have been unchanged. Pt was recently admitted in [facility] for rt scrotal mass (s/p excision) and Infected Cyst/very small Abscess of right side of scrotum (completed zyvox for 3 days, total 5 days). Pt denies fever, chill, cp, abd pain, n/v/c/d or other complains.     History as above, asked to evaluate patient for left thigh cellulitis. He has no fevers or leukocytosis, he is no distress currently.            FH: Sister has RA and Lupus, Father had Testicular mass  SH: Smokes, drinks alcohol and uses other drugs on weekends. Lives with family. (13 May 2019 08:15)      PAST MEDICAL & SURGICAL HISTORY:  Migraine  HTN (hypertension)  DM (diabetes mellitus)  Bipolar disorder  Schizophrenia  ESRD on dialysis  Morbid obesity with BMI of 40.0-44.9, adult  H/O hernia repair      aspirin (Short breath)  aspirin (Swelling)  penicillin (Short breath)  penicillins (Swelling)  shellfish (Unknown)      Meds:  acetaminophen   Tablet .. 650 milliGRAM(s) Oral every 6 hours PRN  clopidogrel Tablet 75 milliGRAM(s) Oral daily  epoetin cyndie Injectable 92532 Unit(s) IV Push <User Schedule>  furosemide    Tablet 80 milliGRAM(s) Oral daily  heparin  Injectable 5000 Unit(s) SubCutaneous every 12 hours  hydrALAZINE 10 milliGRAM(s) Oral three times a day  insulin glargine Injectable (LANTUS) 8 Unit(s) SubCutaneous at bedtime  insulin lispro (HumaLOG) corrective regimen sliding scale   SubCutaneous Before meals and at bedtime  linezolid  IVPB 600 milliGRAM(s) IV Intermittent every 12 hours  metoprolol tartrate 12.5 milliGRAM(s) Oral two times a day  pantoprazole    Tablet 40 milliGRAM(s) Oral before breakfast  QUEtiapine 200 milliGRAM(s) Oral daily  simvastatin 40 milliGRAM(s) Oral at bedtime        FAMILY HISTORY:  Family history of COPD (chronic obstructive pulmonary disease)  Family history of diabetes mellitus      VITALS:  Vital Signs Last 24 Hrs  T(C): 36.8 (13 May 2019 15:15), Max: 37.1 (13 May 2019 07:38)  T(F): 98.3 (13 May 2019 15:15), Max: 98.8 (13 May 2019 07:38)  HR: 87 (13 May 2019 15:15) (80 - 87)  BP: 147/85 (13 May 2019 15:15) (130/78 - 147/85)  BP(mean): --  RR: 18 (13 May 2019 15:15) (16 - 18)  SpO2: 100% (13 May 2019 15:15) (100% - 100%)    LABS/DIAGNOSTIC TESTS:                          8.9    10.53 )-----------( 300      ( 13 May 2019 02:41 )             29.3     WBC Count: 10.53 K/uL (05-13 @ 02:41)      05-13    136  |  96  |  65<H>  ----------------------------<  100<H>  5.1   |  30  |  11.20<H>    Ca    8.0<L>      13 May 2019 02:41    TPro  7.6  /  Alb  3.3<L>  /  TBili  0.3  /  DBili  x   /  AST  21  /  ALT  34  /  AlkPhos  113  05-13          LIVER FUNCTIONS - ( 13 May 2019 02:41 )  Alb: 3.3 g/dL / Pro: 7.6 g/dL / ALK PHOS: 113 U/L / ALT: 34 U/L DA / AST: 21 U/L / GGT: x                 LACTATE:    ABG -     CULTURES:   .Abscess  05-01 @ 18:29   Moderate Enterococcus faecalis (vancomycin resistant)  Numerous Corynebacterium species "Susceptibilities not performed"  Few Coag Negative Staphylococcus "Susceptibilities not performed"  --  Enterococcus faecalis (vancomycin resistant)                RADIOLOGY:< from: CT Lower Extremity w/ IV Cont, Left (05.13.19 @ 13:02) >  EXAM:  CT LWR EXT IC LT                            PROCEDURE DATE:  05/13/2019          INTERPRETATION:  EXAMINATION: CT of the left lower extremity with contrast    CLINICAL INFORMATION: Left lower extremity swelling. Evaluate for abscess    TECHNIQUE: Axial CT images were obtained through the left lower extremity   from the level of the hip to the level of the knee. Coronal and sagittal   reformatted images were made. Images were obtained after the intravenous   administration of 90 mL of Omnipaque 350. 10 mL were discarded    FINDINGS: There is nonspecific infiltration of the subcutaneous tissues   circumferentially about the lower extremity, most pronounced at the level   of the mid to distal femur, consistent with cellulitis in the appropriate   clinical setting. No well-formed/drainable collections are demonstrated.   No acute fracture or dislocation is demonstrated. There are no areas of   cortical destruction or periosteal reaction to suggest osteomyelitis. The   hip and knee joint spaces are maintained. There are nonspecific prominent   inguinal lymph nodes.    IMPRESSION: Nonspecific infiltration of the subcutaneous tissues of the   left lower extremity, most pronounced at the level of the mid to distal   femur, consistentwith cellulitis, in the appropriate clinical setting.   No well-formed/drainable collections are demonstrated.        < end of copied text >        ROS  [  ] UNABLE TO ELICIT

## 2019-05-13 NOTE — H&P ADULT - NSHPPHYSICALEXAM_GEN_ALL_CORE
Vital Signs Last 24 Hrs  T(C): 37.1 (13 May 2019 07:38), Max: 37.1 (13 May 2019 07:38)  T(F): 98.8 (13 May 2019 07:38), Max: 98.8 (13 May 2019 07:38)  HR: 87 (13 May 2019 07:38) (80 - 87)  BP: 131/64 (13 May 2019 07:38) (130/78 - 134/87)  BP(mean): --  RR: 18 (13 May 2019 07:38) (16 - 18)  SpO2: 100% (13 May 2019 07:38) (100% - 100%)

## 2019-05-13 NOTE — H&P ADULT - NSHPLABSRESULTS_GEN_ALL_CORE
Complete Blood Count + Automated Diff (05.13.19 @ 02:41)    WBC Count: 10.53 K/uL    RBC Count: 2.94 M/uL    Hemoglobin: 8.9 g/dL    Hematocrit: 29.3 %    Mean Cell Volume: 99.7 fl    Mean Cell Hemoglobin: 30.3 pg    Mean Cell Hemoglobin Conc: 30.4 gm/dL    Red Cell Distrib Width: 15.2 %    Platelet Count - Automated: 300 K/uL    Auto Neutrophil #: 7.33 K/uL    Auto Lymphocyte #: 1.86 K/uL    Auto Monocyte #: 0.78 K/uL    Auto Eosinophil #: 0.49 K/uL    Auto Basophil #: 0.05 K/uL    Auto Neutrophil %: 69.5: Differential percentages must be correlated with absolute numbers for  clinical significance. %    Auto Lymphocyte %: 17.7 %    Auto Monocyte %: 7.4 %    Auto Eosinophil %: 4.7 %    Auto Basophil %: 0.5 %    Auto Immature Granulocyte %: 0.2 %    Nucleated RBC: 0 /100 WBCs    Comprehensive Metabolic Panel (05.13.19 @ 02:41)    Sodium, Serum: 136 mmol/L    Potassium, Serum: 5.1 mmol/L    Chloride, Serum: 96 mmol/L    Carbon Dioxide, Serum: 30 mmol/L    Anion Gap, Serum: 10 mmol/L    Blood Urea Nitrogen, Serum: 65 mg/dL    Creatinine, Serum: 11.20 mg/dL    Glucose, Serum: 100 mg/dL    Calcium, Total Serum: 8.0 mg/dL    Protein Total, Serum: 7.6 g/dL    Albumin, Serum: 3.3 g/dL    Bilirubin Total, Serum: 0.3 mg/dL    Alkaline Phosphatase, Serum: 113 U/L    Aspartate Aminotransferase (AST/SGOT): 21 U/L    Alanine Aminotransferase (ALT/SGPT): 34 U/L DA    eGFR if Non : 5:     < from: US Duplex Venous Lower Ext Ltd, Left (05.12.19 @ 22:06) >    IMPRESSION:   Distal femoral vein not visualized. Otherwise no evidence of left lower   extremity deep venous thrombosis.    < end of copied text >

## 2019-05-13 NOTE — H&P ADULT - PROBLEM SELECTOR PLAN 6
IMPROVE VTE Individual Risk Assessment    RISK                                                          Points  [] Previous VTE                                           3  [] Thrombophilia                                        2  [] Lower limb paralysis                              2   [] Current Cancer                                       2   [x] Immobilization > 24 hrs                        1  [] ICU/CCU stay > 24 hours                       1  [] Age > 60                                                   1    IMPROVE VTE Score: 1  on heparin sq for dvt ppx  on protonix for gi ppx

## 2019-05-13 NOTE — CONSULT NOTE ADULT - SUBJECTIVE AND OBJECTIVE BOX
[facility] : Progress Note :: [phone], (office [phone]),   Dr La / Dr Hui / Dr Barber / Dr Villalobos / Dr Hang CASTELLANO / Dr Mehta / Dr Sanchez / Dr Alber dumont  _____________________________________________________________________________________________    39 yr old male with PMHX as below. Multiple admissions with fluid overload, and bilateral leg pains. Recently admitted to this hospital with scrotal abcess. Presents to ED with RT thigh pain while walking. He gets HD MWF and last dialysis was Friday. He has SOB as well. Plans for CT thigh with IV contrast. Renal consulted as with ESRD.      PAST MEDICAL & SURGICAL HISTORY:  Migraine  HTN (hypertension)  DM (diabetes mellitus)  Bipolar disorder  Schizophrenia  ESRD on dialysis  Morbid obesity with BMI of 40.0-44.9, adult  H/O hernia repair    aspirin (Short breath)  aspirin (Swelling)  penicillin (Short breath)  penicillins (Swelling)  shellfish (Unknown)    Home Medications Reviewed  Hospital Medications:   MEDICATIONS  (STANDING):  clopidogrel Tablet 75 milliGRAM(s) Oral daily  epoetin cyndie Injectable 67364 Unit(s) IV Push <User Schedule>  furosemide    Tablet 80 milliGRAM(s) Oral daily  heparin  Injectable 5000 Unit(s) SubCutaneous every 12 hours  hydrALAZINE 10 milliGRAM(s) Oral three times a day  insulin glargine Injectable (LANTUS) 8 Unit(s) SubCutaneous at bedtime  insulin lispro (HumaLOG) corrective regimen sliding scale   SubCutaneous Before meals and at bedtime  linezolid  IVPB 600 milliGRAM(s) IV Intermittent every 12 hours  metoprolol tartrate 12.5 milliGRAM(s) Oral two times a day  pantoprazole    Tablet 40 milliGRAM(s) Oral before breakfast  QUEtiapine 200 milliGRAM(s) Oral daily  simvastatin 40 milliGRAM(s) Oral at bedtime    SOCIAL HISTORY:  Denies ETOh,Smoking,   FAMILY HISTORY:  Family history of COPD (chronic obstructive pulmonary disease)  Family history of diabetes mellitus        VITALS:  T(F): 98.8 (05-13-19 @ 07:38), Max: 98.8 (05-13-19 @ 07:38)  HR: 87 (05-13-19 @ 07:38)  BP: 131/64 (05-13-19 @ 07:38)  RR: 18 (05-13-19 @ 07:38)  SpO2: 100% (05-13-19 @ 07:38)  Wt(kg): --    Height (cm): 200.66 (05-12 @ 20:27)  Weight (kg): 167.8 (05-12 @ 20:27)  BMI (kg/m2): 41.7 (05-12 @ 20:27)  BSA (m2): 2.96 (05-12 @ 20:27)    PHYSICAL EXAM:  Constitutional: NAD  HEENT: anicteric sclera, oropharynx clear.  Neck: No JVD  Respiratory: CTAB, no wheezes, rales or rhonchi  Cardiovascular: S1, S2, RRR  Gastrointestinal: BS+, soft, NT/ND  Extremities:  peripheral edema++. Rt thigh warm, swollen and tender  Neurological: A/O x 3, no focal deficits  Vascular Access: LUEAVF with thrill and bruit    LABS:  05-13    136  |  96  |  65<H>  ----------------------------<  100<H>  5.1   |  30  |  11.20<H>    Ca    8.0<L>      13 May 2019 02:41    TPro  7.6  /  Alb  3.3<L>  /  TBili  0.3  /  DBili      /  AST  21  /  ALT  34  /  AlkPhos  113  05-13    Creatinine Trend: 11.20 <--                        8.9    10.53 )-----------( 300      ( 13 May 2019 02:41 )             29.3     Urine Studies:      RADIOLOGY & ADDITIONAL STUDIES:

## 2019-05-13 NOTE — H&P ADULT - PROBLEM SELECTOR PLAN 1
p/w Lt thigh pain/swelling/erythema/warmth, hx of small abscess rt scrotum   likely LLE cellulitis  afebrile, mild leukocytosis   US LE negative DVT  started zyvox 600mg bid   f/u BCx  ID DR. Amaral p/w Lt thigh pain/swelling/erythema/warmth, hx of small abscess rt scrotum   likely LLE cellulitis  afebrile, mild leukocytosis   US LE negative DVT  started zyvox 600mg bid   f/u BCx  f/u CT LE   ID DR. Amaral  [facility]  Surgery DR. Huber

## 2019-05-14 ENCOUNTER — TRANSCRIPTION ENCOUNTER (OUTPATIENT)
Age: 39
End: 2019-05-14

## 2019-05-14 DIAGNOSIS — M79.605 PAIN IN LEFT LEG: ICD-10-CM

## 2019-05-14 LAB
ANION GAP SERPL CALC-SCNC: 11 MMOL/L — SIGNIFICANT CHANGE UP (ref 5–17)
BASOPHILS # BLD AUTO: 0.04 K/UL — SIGNIFICANT CHANGE UP (ref 0–0.2)
BASOPHILS NFR BLD AUTO: 0.5 % — SIGNIFICANT CHANGE UP (ref 0–2)
BUN SERPL-MCNC: 51 MG/DL — HIGH (ref 7–18)
CALCIUM SERPL-MCNC: 7.7 MG/DL — LOW (ref 8.4–10.5)
CHLORIDE SERPL-SCNC: 98 MMOL/L — SIGNIFICANT CHANGE UP (ref 96–108)
CO2 SERPL-SCNC: 27 MMOL/L — SIGNIFICANT CHANGE UP (ref 22–31)
CREAT SERPL-MCNC: 9.28 MG/DL — HIGH (ref 0.5–1.3)
EOSINOPHIL # BLD AUTO: 0.34 K/UL — SIGNIFICANT CHANGE UP (ref 0–0.5)
EOSINOPHIL NFR BLD AUTO: 3.9 % — SIGNIFICANT CHANGE UP (ref 0–6)
GLUCOSE BLDC GLUCOMTR-MCNC: 107 MG/DL — HIGH (ref 70–99)
GLUCOSE BLDC GLUCOMTR-MCNC: 129 MG/DL — HIGH (ref 70–99)
GLUCOSE BLDC GLUCOMTR-MCNC: 130 MG/DL — HIGH (ref 70–99)
GLUCOSE BLDC GLUCOMTR-MCNC: 134 MG/DL — HIGH (ref 70–99)
GLUCOSE SERPL-MCNC: 110 MG/DL — HIGH (ref 70–99)
HCT VFR BLD CALC: 27.5 % — LOW (ref 39–50)
HGB BLD-MCNC: 8.5 G/DL — LOW (ref 13–17)
IMM GRANULOCYTES NFR BLD AUTO: 0.5 % — SIGNIFICANT CHANGE UP (ref 0–1.5)
LYMPHOCYTES # BLD AUTO: 1.4 K/UL — SIGNIFICANT CHANGE UP (ref 1–3.3)
LYMPHOCYTES # BLD AUTO: 15.9 % — SIGNIFICANT CHANGE UP (ref 13–44)
MAGNESIUM SERPL-MCNC: 2.2 MG/DL — SIGNIFICANT CHANGE UP (ref 1.6–2.6)
MCHC RBC-ENTMCNC: 30.2 PG — SIGNIFICANT CHANGE UP (ref 27–34)
MCHC RBC-ENTMCNC: 30.9 GM/DL — LOW (ref 32–36)
MCV RBC AUTO: 97.9 FL — SIGNIFICANT CHANGE UP (ref 80–100)
MONOCYTES # BLD AUTO: 0.78 K/UL — SIGNIFICANT CHANGE UP (ref 0–0.9)
MONOCYTES NFR BLD AUTO: 8.9 % — SIGNIFICANT CHANGE UP (ref 2–14)
NEUTROPHILS # BLD AUTO: 6.19 K/UL — SIGNIFICANT CHANGE UP (ref 1.8–7.4)
NEUTROPHILS NFR BLD AUTO: 70.3 % — SIGNIFICANT CHANGE UP (ref 43–77)
NRBC # BLD: 0 /100 WBCS — SIGNIFICANT CHANGE UP (ref 0–0)
PHOSPHATE SERPL-MCNC: 6.8 MG/DL — HIGH (ref 2.5–4.5)
PLATELET # BLD AUTO: 278 K/UL — SIGNIFICANT CHANGE UP (ref 150–400)
POTASSIUM SERPL-MCNC: 4.9 MMOL/L — SIGNIFICANT CHANGE UP (ref 3.5–5.3)
POTASSIUM SERPL-SCNC: 4.9 MMOL/L — SIGNIFICANT CHANGE UP (ref 3.5–5.3)
RBC # BLD: 2.81 M/UL — LOW (ref 4.2–5.8)
RBC # FLD: 15.1 % — HIGH (ref 10.3–14.5)
SODIUM SERPL-SCNC: 136 MMOL/L — SIGNIFICANT CHANGE UP (ref 135–145)
WBC # BLD: 8.79 K/UL — SIGNIFICANT CHANGE UP (ref 3.8–10.5)
WBC # FLD AUTO: 8.79 K/UL — SIGNIFICANT CHANGE UP (ref 3.8–10.5)

## 2019-05-14 RX ORDER — FERROUS SULFATE 325(65) MG
1 TABLET ORAL
Qty: 60 | Refills: 0
Start: 2019-05-14 | End: 2019-06-12

## 2019-05-14 RX ORDER — SIMVASTATIN 20 MG/1
1 TABLET, FILM COATED ORAL
Qty: 0 | Refills: 0 | DISCHARGE

## 2019-05-14 RX ORDER — VANCOMYCIN HCL 1 G
1.25 VIAL (EA) INTRAVENOUS
Qty: 3.75 | Refills: 0
Start: 2019-05-14 | End: 2019-05-20

## 2019-05-14 RX ORDER — HYDRALAZINE HCL 50 MG
1 TABLET ORAL
Qty: 0 | Refills: 0 | DISCHARGE

## 2019-05-14 RX ORDER — SEVELAMER CARBONATE 2400 MG/1
1600 POWDER, FOR SUSPENSION ORAL
Refills: 0 | Status: DISCONTINUED | OUTPATIENT
Start: 2019-05-14 | End: 2019-05-15

## 2019-05-14 RX ORDER — PANTOPRAZOLE SODIUM 20 MG/1
1 TABLET, DELAYED RELEASE ORAL
Qty: 0 | Refills: 0 | DISCHARGE

## 2019-05-14 RX ORDER — LIDOCAINE 4 G/100G
1 CREAM TOPICAL DAILY
Refills: 0 | Status: DISCONTINUED | OUTPATIENT
Start: 2019-05-14 | End: 2019-05-15

## 2019-05-14 RX ORDER — ASCORBIC ACID 60 MG
1 TABLET,CHEWABLE ORAL
Qty: 30 | Refills: 0
Start: 2019-05-14 | End: 2019-06-12

## 2019-05-14 RX ORDER — FOLIC ACID 0.8 MG
1 TABLET ORAL
Qty: 30 | Refills: 0
Start: 2019-05-14 | End: 2019-06-12

## 2019-05-14 RX ORDER — OXYCODONE AND ACETAMINOPHEN 5; 325 MG/1; MG/1
1 TABLET ORAL ONCE
Refills: 0 | Status: DISCONTINUED | OUTPATIENT
Start: 2019-05-14 | End: 2019-05-14

## 2019-05-14 RX ORDER — QUETIAPINE FUMARATE 200 MG/1
1 TABLET, FILM COATED ORAL
Qty: 0 | Refills: 0 | DISCHARGE

## 2019-05-14 RX ORDER — CLOPIDOGREL BISULFATE 75 MG/1
1 TABLET, FILM COATED ORAL
Qty: 0 | Refills: 0 | DISCHARGE

## 2019-05-14 RX ORDER — FUROSEMIDE 40 MG
1 TABLET ORAL
Qty: 0 | Refills: 0 | DISCHARGE

## 2019-05-14 RX ORDER — ASCORBIC ACID 60 MG
500 TABLET,CHEWABLE ORAL DAILY
Refills: 0 | Status: DISCONTINUED | OUTPATIENT
Start: 2019-05-14 | End: 2019-05-15

## 2019-05-14 RX ORDER — METOPROLOL TARTRATE 50 MG
0.5 TABLET ORAL
Qty: 30 | Refills: 0
Start: 2019-05-14 | End: 2019-06-12

## 2019-05-14 RX ORDER — INSULIN GLARGINE 100 [IU]/ML
8 INJECTION, SOLUTION SUBCUTANEOUS
Qty: 0 | Refills: 0 | DISCHARGE

## 2019-05-14 RX ORDER — FOLIC ACID 0.8 MG
1 TABLET ORAL DAILY
Refills: 0 | Status: DISCONTINUED | OUTPATIENT
Start: 2019-05-14 | End: 2019-05-15

## 2019-05-14 RX ORDER — FERROUS SULFATE 325(65) MG
325 TABLET ORAL DAILY
Refills: 0 | Status: DISCONTINUED | OUTPATIENT
Start: 2019-05-14 | End: 2019-05-15

## 2019-05-14 RX ORDER — HYDROMORPHONE HYDROCHLORIDE 2 MG/ML
0.5 INJECTION INTRAMUSCULAR; INTRAVENOUS; SUBCUTANEOUS ONCE
Refills: 0 | Status: DISCONTINUED | OUTPATIENT
Start: 2019-05-14 | End: 2019-05-14

## 2019-05-14 RX ADMIN — Medication 500 MILLIGRAM(S): at 13:41

## 2019-05-14 RX ADMIN — LINEZOLID 300 MILLIGRAM(S): 600 INJECTION, SOLUTION INTRAVENOUS at 19:57

## 2019-05-14 RX ADMIN — LIDOCAINE 1 PATCH: 4 CREAM TOPICAL at 22:03

## 2019-05-14 RX ADMIN — INSULIN GLARGINE 8 UNIT(S): 100 INJECTION, SOLUTION SUBCUTANEOUS at 22:22

## 2019-05-14 RX ADMIN — SEVELAMER CARBONATE 1600 MILLIGRAM(S): 2400 POWDER, FOR SUSPENSION ORAL at 19:58

## 2019-05-14 RX ADMIN — HYDROMORPHONE HYDROCHLORIDE 0.5 MILLIGRAM(S): 2 INJECTION INTRAMUSCULAR; INTRAVENOUS; SUBCUTANEOUS at 03:00

## 2019-05-14 RX ADMIN — LINEZOLID 300 MILLIGRAM(S): 600 INJECTION, SOLUTION INTRAVENOUS at 06:01

## 2019-05-14 RX ADMIN — Medication 325 MILLIGRAM(S): at 13:41

## 2019-05-14 RX ADMIN — PANTOPRAZOLE SODIUM 40 MILLIGRAM(S): 20 TABLET, DELAYED RELEASE ORAL at 06:02

## 2019-05-14 RX ADMIN — HYDROMORPHONE HYDROCHLORIDE 0.5 MILLIGRAM(S): 2 INJECTION INTRAMUSCULAR; INTRAVENOUS; SUBCUTANEOUS at 02:31

## 2019-05-14 RX ADMIN — HEPARIN SODIUM 5000 UNIT(S): 5000 INJECTION INTRAVENOUS; SUBCUTANEOUS at 19:58

## 2019-05-14 RX ADMIN — Medication 10 MILLIGRAM(S): at 06:02

## 2019-05-14 RX ADMIN — Medication 12.5 MILLIGRAM(S): at 19:58

## 2019-05-14 RX ADMIN — CLOPIDOGREL BISULFATE 75 MILLIGRAM(S): 75 TABLET, FILM COATED ORAL at 13:41

## 2019-05-14 RX ADMIN — Medication 1 MILLIGRAM(S): at 13:41

## 2019-05-14 RX ADMIN — Medication 10 MILLIGRAM(S): at 13:49

## 2019-05-14 RX ADMIN — Medication 12.5 MILLIGRAM(S): at 06:02

## 2019-05-14 RX ADMIN — QUETIAPINE FUMARATE 200 MILLIGRAM(S): 200 TABLET, FILM COATED ORAL at 13:41

## 2019-05-14 RX ADMIN — SEVELAMER CARBONATE 1600 MILLIGRAM(S): 2400 POWDER, FOR SUSPENSION ORAL at 13:41

## 2019-05-14 RX ADMIN — Medication 80 MILLIGRAM(S): at 06:02

## 2019-05-14 RX ADMIN — HEPARIN SODIUM 5000 UNIT(S): 5000 INJECTION INTRAVENOUS; SUBCUTANEOUS at 06:15

## 2019-05-14 NOTE — PROGRESS NOTE ADULT - SUBJECTIVE AND OBJECTIVE BOX
HPI:  39 Male with PMH of Type 2 DM, Migraine, ESRD on HD (left arm graft, M/W/F), Morbid obesity, HTN, Chr dyspnea (uses home O2 prn), Schizophrenia, Bacteremia due to foot cellulitis presented to ED c/o left leg pain and swelling since yesterday afternoon. Pt states that she was walking to a [employer] with his mother; after walking about 10 minutes he sat down and felt his left thigh "blow up" and become tense and painful. Pt states that he has chronic SOB and can only sleep while sitting up; these Sx have been unchanged. Pt was recently admitted in [facility] for rt scrotal mass (s/p excision) and Infected Cyst/very small Abscess of right side of scrotum (completed zyvox for 3 days, total 5 days). Pt denies fever, chill, cp, abd pain, n/v/c/d or other complains.     ED course, pt vitals stable, labs noted wbc 10.5k, BUN/Cr 65/11.2, last HD on Friday.     FH: Sister has RA and Lupus, Father had Testicular mass  SH: Smokes, drinks alcohol and uses other drugs on weekends. Lives with family. (13 May 2019 08:15)      Patient is a 39y old  Male who presents with a chief complaint of left leg pain and swelling (14 May 2019 12:19)      INTERVAL HPI/OVERNIGHT EVENTS:  T(C): 36.8 (05-14-19 @ 05:38), Max: 36.8 (05-13-19 @ 15:15)  HR: 87 (05-14-19 @ 05:38) (76 - 87)  BP: 135/79 (05-14-19 @ 05:38) (135/79 - 147/85)  RR: 18 (05-14-19 @ 05:38) (17 - 18)  SpO2: 100% (05-14-19 @ 05:38) (100% - 100%)  Wt(kg): --  I&O's Summary    13 May 2019 07:01  -  14 May 2019 07:00  --------------------------------------------------------  IN: 240 mL / OUT: 700 mL / NET: -460 mL        REVIEW OF SYSTEMS: denies fever, chills, SOB, palpitations, chest pain, abdominal pain, nausea, vomitting, diarrhea, constipation, dizziness    MEDICATIONS  (STANDING):  ascorbic acid 500 milliGRAM(s) Oral daily  clopidogrel Tablet 75 milliGRAM(s) Oral daily  epoetin cyndie Injectable 04279 Unit(s) IV Push <User Schedule>  ferrous    sulfate 325 milliGRAM(s) Oral daily  folic acid 1 milliGRAM(s) Oral daily  furosemide    Tablet 80 milliGRAM(s) Oral daily  heparin  Injectable 5000 Unit(s) SubCutaneous every 12 hours  hydrALAZINE 10 milliGRAM(s) Oral three times a day  insulin glargine Injectable (LANTUS) 8 Unit(s) SubCutaneous at bedtime  insulin lispro (HumaLOG) corrective regimen sliding scale   SubCutaneous Before meals and at bedtime  linezolid  IVPB 600 milliGRAM(s) IV Intermittent every 12 hours  metoprolol tartrate 12.5 milliGRAM(s) Oral two times a day  pantoprazole    Tablet 40 milliGRAM(s) Oral before breakfast  QUEtiapine 200 milliGRAM(s) Oral daily  sevelamer carbonate 1600 milliGRAM(s) Oral three times a day with meals  simvastatin 40 milliGRAM(s) Oral at bedtime    MEDICATIONS  (PRN):  acetaminophen   Tablet .. 650 milliGRAM(s) Oral every 6 hours PRN Temp greater or equal to 38C (100.4F), Mild Pain (1 - 3)      PHYSICAL EXAM:  GENERAL: NAD, well-groomed, well-developed  HEAD:  Atraumatic, Normocephalic  EYES: EOMI, PERRLA, conjunctiva and sclera clear  ENMT: No tonsillar erythema, exudates, or enlargement; Moist mucous membranes, Good dentition, No lesions  NECK: Supple, No JVD, Normal thyroid  NERVOUS SYSTEM:  Alert & Oriented X3, Good concentration; Motor Strength 5/5 B/L upper and lower extremities; DTRs 2+ intact and symmetric  CHEST/LUNG: Clear to percussion bilaterally; No rales, rhonchi, wheezing, or rubs  HEART: Regular rate and rhythm; No murmurs, rubs, or gallops  ABDOMEN: Soft, Nontender, Nondistended; Bowel sounds present  EXTREMITIES:  2+ Peripheral Pulses, No clubbing, cyanosis, or edema  LYMPH: No lymphadenopathy noted  SKIN: No rashes or lesions  LABS:                        8.5    8.79  )-----------( 278      ( 14 May 2019 07:25 )             27.5     05-14    136  |  98  |  51<H>  ----------------------------<  110<H>  4.9   |  27  |  9.28<H>    Ca    7.7<L>      14 May 2019 07:25  Phos  6.8     05-14  Mg     2.2     05-14    TPro  7.6  /  Alb  3.3<L>  /  TBili  0.3  /  DBili  x   /  AST  21  /  ALT  34  /  AlkPhos  113  05-13        CAPILLARY BLOOD GLUCOSE      POCT Blood Glucose.: 130 mg/dL (14 May 2019 13:12)  POCT Blood Glucose.: 107 mg/dL (14 May 2019 08:19)  POCT Blood Glucose.: 123 mg/dL (13 May 2019 22:45)  POCT Blood Glucose.: 143 mg/dL (13 May 2019 16:50)

## 2019-05-14 NOTE — CONSULT NOTE ADULT - ASSESSMENT
# ESRD. will arrange for HD post CT thigh with contrast.  # hypervolemia. asked control fluid and salt intake. UF on HD . cont lasix  # anemia of CKD procrit on HD  # RT thigh swelling. CT scan pending
Search Terms: ayana mace, 1980   Search Date: 05/14/2019 08:42:59 PM   The Drug Utilization Report below displays all of the controlled substance prescriptions, if any, that your patient has filled in the last twelve months. The information displayed on this report is compiled from pharmacy submissions to the Department, and accurately reflects the information as submitted by the pharmacies.  This report was requested by: Chanel Woodall | Reference #: [id number]   My Prescriptions  Patient Name:	Ayana Mace	YOB: 1980	  Address:	[address] [place] [address]	Sex:	Male	    Rx Written	Rx Dispensed	Drug	Quantity	Days Supply	Prescriber Name			  12/06/2018	12/06/2018	hydromorphone 2 mg tablet 	20	5	Chanel Woodall			  Others' Prescriptions  Patient Name:	Ayana Mace	YOB: 1980	  Address:	[address] [place], [place] [id number]	Sex:	Male	    Rx Written	Rx Dispensed	Drug	Quantity	Days Supply	Prescriber Name			  11/04/2018	11/06/2018	hydromorphone 2 mg tablet 	10	5	Marcos Padilla MD			  * - Drugs marked with an asterisk are compound drugs. If the compound drug is made up of more than one controlled substance, then each controlled substance will be a separate row
Left thigh cellulitis (mild)    plan - cont zyvox 600mgs q12 hrs for now  Can plan to dc home tomorrow after his afternoon zyvox dose on vancomycin 1250mgs iv 3 x week at dialysis from Wednesday for a total of 3 doses.

## 2019-05-14 NOTE — PROGRESS NOTE ADULT - PROBLEM SELECTOR PLAN 1
p/w Lt thigh pain/swelling/erythema/warmth, hx of small abscess rt scrotum   likely LLE cellulitis  afebrile, mild leukocytosis   US LE negative DVT  started zyvox 600mg bid   f/u BCx  f/u CT LE   ID DR. Amaral  [facility]  Surgery DR. Huber No abscess on CT angio thigh  Surgery was consulted with no intervention advised as it shows cellulitis  Pt satble for discharge with iv vancomycin with dialysis

## 2019-05-14 NOTE — CONSULT NOTE ADULT - SUBJECTIVE AND OBJECTIVE BOX
Chief Complaint:  Left thigh pain    HPI:  39 Male with PMH of Type 2 DM, Migraine, ESRD on HD (left arm graft, M/W/F), Morbid obesity, HTN, Chr dyspnea (uses home O2 prn), Schizophrenia, Bacteremia due to foot cellulitis presented to ED c/o left leg pain and swelling since yesterday afternoon. Pt states that he was walking to a [employer] with his mother; after walking about 10 minutes he sat down and felt his left thigh "blow up" and become tense and painful. Pt states that he has chronic SOB and can only sleep while sitting up; these Sx have been unchanged. Pt was recently admitted in [facility] for rt scrotal mass (s/p excision) and Infected Cyst/very small Abscess of right side of scrotum (completed zyvox for 3 days, total 5 days). Pt denies fever, chill, cp, abd pain, n/v/c/d or other complains.     39 year old male, well known to facility, is admitted with left leg swelling and pain.  Patient frequent visits ED for similar symptoms of leg pain, leg spasms, and cellulitis.  Pt has been on po antibiotics as outpt.  Pt frequently asking for iv opioids.  Pt stating that tylenol or gabapentin is not effective.  CT of le -  Nonspecific infiltration of the subcutaneous tissues of the left lower extremity, most pronounced at the level of the mid to distal femur, consistent with cellulitis, in the appropriate clinical setting. No well-formed/drainable collections are demonstrated.    ED course, pt vitals stable, labs noted wbc 10.5k, BUN/Cr 65/11.2, last HD on Friday.     FH: Sister has RA and Lupus, Father had Testicular mass  SH: Smokes, drinks alcohol and uses other drugs on weekends. Lives with family. (13 May 2019 08:15)      PAST MEDICAL & SURGICAL HISTORY:  Migraine  HTN (hypertension)  DM (diabetes mellitus)  Bipolar disorder  Schizophrenia  ESRD on dialysis  Morbid obesity with BMI of 40.0-44.9, adult  H/O hernia repair      FAMILY HISTORY:  Family history of COPD (chronic obstructive pulmonary disease)  Family history of diabetes mellitus      SOCIAL HISTORY:  + history of marijuana use    Allergies    aspirin (Short breath)  aspirin (Swelling)  penicillin (Short breath)  penicillins (Swelling)  shellfish (Unknown)    Intolerances        PAIN MEDICATIONS:  acetaminophen   Tablet .. 650 milliGRAM(s) Oral every 6 hours PRN  QUEtiapine 200 milliGRAM(s) Oral daily      Heme:  clopidogrel Tablet 75 milliGRAM(s) Oral daily  heparin  Injectable 5000 Unit(s) SubCutaneous every 12 hours    Antibiotics:  linezolid  IVPB 600 milliGRAM(s) IV Intermittent every 12 hours    Cardiovascular:  furosemide    Tablet 80 milliGRAM(s) Oral daily  hydrALAZINE 10 milliGRAM(s) Oral three times a day  metoprolol tartrate 12.5 milliGRAM(s) Oral two times a day    GI:  pantoprazole    Tablet 40 milliGRAM(s) Oral before breakfast    Endocrine:  insulin glargine Injectable (LANTUS) 8 Unit(s) SubCutaneous at bedtime  insulin lispro (HumaLOG) corrective regimen sliding scale   SubCutaneous Before meals and at bedtime  simvastatin 40 milliGRAM(s) Oral at bedtime    All Other Medications:  ascorbic acid 500 milliGRAM(s) Oral daily  epoetin cyndie Injectable 82298 Unit(s) IV Push <User Schedule>  ferrous    sulfate 325 milliGRAM(s) Oral daily  folic acid 1 milliGRAM(s) Oral daily  sevelamer carbonate 1600 milliGRAM(s) Oral three times a day with meals      REVIEW OF SYSTEMS:    CONSTITUTIONAL: No fever, weight loss, or fatigue  RESPIRATORY: No cough, wheezing, chills or hemoptysis; No shortness of breath  CARDIOVASCULAR: No chest pain, palpitations, dizziness, or leg swelling  GASTROINTESTINAL: No abdominal or epigastric pain. No nausea, vomiting, or hematemesis; No diarrhea or constipation.   GENITOURINARY: No dysuria, frequency, hematuria, or incontinence  NEUROLOGICAL: No headaches, memory loss, loss of strength, numbness, or tremors  MUSCULOSKELETAL:  + left thigh pain      Vital Signs Last 24 Hrs  T(C): 36.8 (14 May 2019 05:38), Max: 36.8 (13 May 2019 23:40)  T(F): 98.2 (14 May 2019 05:38), Max: 98.2 (13 May 2019 23:40)  HR: 90 (14 May 2019 13:47) (76 - 90)  BP: 154/78 (14 May 2019 13:47) (135/79 - 154/78)  BP(mean): --  RR: 18 (14 May 2019 16:22) (17 - 18)  SpO2: 100% (14 May 2019 16:22) (100% - 100%)    PAIN SCORE:    5/10     SCALE USED: (1-10 VNRS)             PHYSICAL EXAM:    GENERAL: NAD, well-groomed, well-developed  HEAD:  Atraumatic, Normocephalic  EYES: EOMI, PERRLA, conjunctiva and sclera clear  ENMT: No tonsillar erythema, exudates, or enlargement; Moist mucous membranes, Good dentition, No lesions  NECK: Supple, No JVD, Normal thyroid  NERVOUS SYSTEM:  Alert & Oriented X3, Good concentration; Motor Strength 5/5 B/L upper and lower extremities; DTRs 2+ intact and symmetric  CHEST/LUNG: Clear to percussion bilaterally; No rales, rhonchi, wheezing, or rubs  HEART: Regular rate and rhythm; No murmurs, rubs, or gallops  ABDOMEN: Soft, Nontender, Nondistended; Bowel sounds present  EXTREMITIES:  + le edema, +       LABS:                          8.5    8.79  )-----------( 278      ( 14 May 2019 07:25 )             27.5     05-14    136  |  98  |  51<H>  ----------------------------<  110<H>  4.9   |  27  |  9.28<H>    Ca    7.7<L>      14 May 2019 07:25  Phos  6.8     05-14  Mg     2.2     05-14    TPro  7.6  /  Alb  3.3<L>  /  TBili  0.3  /  DBili  x   /  AST  21  /  ALT  34  /  AlkPhos  113  05-13          RADIOLOGY:    Drug Screen:        RECOMMENDATIONS    [ ]  [place]  Reviewed and Copied to Chart Chief Complaint:  Left thigh pain    HPI:  39 Male with PMH of Type 2 DM, Migraine, ESRD on HD (left arm graft, M/W/F), Morbid obesity, HTN, Chr dyspnea (uses home O2 prn), Schizophrenia, Bacteremia due to foot cellulitis presented to ED c/o left leg pain and swelling since yesterday afternoon. Pt states that he was walking to a [employer] with his mother; after walking about 10 minutes he sat down and felt his left thigh "blow up" and become tense and painful. Pt states that he has chronic SOB and can only sleep while sitting up; these Sx have been unchanged. Pt was recently admitted in [facility] for rt scrotal mass (s/p excision) and Infected Cyst/very small Abscess of right side of scrotum (completed zyvox for 3 days, total 5 days). Pt denies fever, chill, cp, abd pain, n/v/c/d or other complains.     39 year old male, well known to facility, is admitted with left leg swelling and pain.  Patient frequent visits ED for similar symptoms of leg pain, leg spasms, and cellulitis.  Pt has been on po antibiotics as outpt.  Pt frequently asking for iv opioids.  Pt stating that tylenol or gabapentin is not effective.  CT of le -  Nonspecific infiltration of the subcutaneous tissues of the left lower extremity, most pronounced at the level of the mid to distal femur, consistent with cellulitis, in the appropriate clinical setting. No well-formed/drainable collections are demonstrated. Pt with ESRD - on HD.  As per nephrologist - pt is very noncompliant.      ED course, pt vitals stable, labs noted wbc 10.5k, BUN/Cr 65/11.2, last HD on Friday.     FH: Sister has RA and Lupus, Father had Testicular mass  SH: Smokes, drinks alcohol and uses other drugs on weekends. Lives with family. (13 May 2019 08:15)      PAST MEDICAL & SURGICAL HISTORY:  Migraine  HTN (hypertension)  DM (diabetes mellitus)  Bipolar disorder  Schizophrenia  ESRD on dialysis  Morbid obesity with BMI of 40.0-44.9, adult  H/O hernia repair      FAMILY HISTORY:  Family history of COPD (chronic obstructive pulmonary disease)  Family history of diabetes mellitus      SOCIAL HISTORY:  + history of marijuana use    Allergies    aspirin (Short breath)  aspirin (Swelling)  penicillin (Short breath)  penicillins (Swelling)  shellfish (Unknown)    Intolerances        PAIN MEDICATIONS:  acetaminophen   Tablet .. 650 milliGRAM(s) Oral every 6 hours PRN  QUEtiapine 200 milliGRAM(s) Oral daily      Heme:  clopidogrel Tablet 75 milliGRAM(s) Oral daily  heparin  Injectable 5000 Unit(s) SubCutaneous every 12 hours    Antibiotics:  linezolid  IVPB 600 milliGRAM(s) IV Intermittent every 12 hours    Cardiovascular:  furosemide    Tablet 80 milliGRAM(s) Oral daily  hydrALAZINE 10 milliGRAM(s) Oral three times a day  metoprolol tartrate 12.5 milliGRAM(s) Oral two times a day    GI:  pantoprazole    Tablet 40 milliGRAM(s) Oral before breakfast    Endocrine:  insulin glargine Injectable (LANTUS) 8 Unit(s) SubCutaneous at bedtime  insulin lispro (HumaLOG) corrective regimen sliding scale   SubCutaneous Before meals and at bedtime  simvastatin 40 milliGRAM(s) Oral at bedtime    All Other Medications:  ascorbic acid 500 milliGRAM(s) Oral daily  epoetin cyndie Injectable 51652 Unit(s) IV Push <User Schedule>  ferrous    sulfate 325 milliGRAM(s) Oral daily  folic acid 1 milliGRAM(s) Oral daily  sevelamer carbonate 1600 milliGRAM(s) Oral three times a day with meals      REVIEW OF SYSTEMS:    CONSTITUTIONAL: No fever, weight loss, or fatigue  RESPIRATORY: No cough, wheezing, chills or hemoptysis; No shortness of breath  CARDIOVASCULAR: No chest pain, palpitations, dizziness, or leg swelling  GASTROINTESTINAL: No abdominal or epigastric pain. No nausea, vomiting, or hematemesis; No diarrhea or constipation.   GENITOURINARY: No dysuria, frequency, hematuria, or incontinence  NEUROLOGICAL: No headaches, memory loss, loss of strength, numbness, or tremors  MUSCULOSKELETAL:  + left thigh pain      Vital Signs Last 24 Hrs  T(C): 36.8 (14 May 2019 05:38), Max: 36.8 (13 May 2019 23:40)  T(F): 98.2 (14 May 2019 05:38), Max: 98.2 (13 May 2019 23:40)  HR: 90 (14 May 2019 13:47) (76 - 90)  BP: 154/78 (14 May 2019 13:47) (135/79 - 154/78)  BP(mean): --  RR: 18 (14 May 2019 16:22) (17 - 18)  SpO2: 100% (14 May 2019 16:22) (100% - 100%)    PAIN SCORE:    5/10     SCALE USED: (1-10 VNRS)             PHYSICAL EXAM:    GENERAL: NAD, morbidly obese   NERVOUS SYSTEM:  Alert & Oriented X3, Good concentration;   CHEST/LUNG: Clear to percussion bilaterally; No rales, rhonchi, wheezing, or rubs  HEART: Regular rate and rhythm; No murmurs, rubs, or gallops  ABDOMEN: Soft, Nontender, Nondistended; Bowel sounds present  EXTREMITIES:  + le edema, + left thigh edema + pain on palp + left thigh      LABS:                          8.5    8.79  )-----------( 278      ( 14 May 2019 07:25 )             27.5     05-14    136  |  98  |  51<H>  ----------------------------<  110<H>  4.9   |  27  |  9.28<H>    Ca    7.7<L>      14 May 2019 07:25  Phos  6.8     05-14  Mg     2.2     05-14    TPro  7.6  /  Alb  3.3<L>  /  TBili  0.3  /  DBili  x   /  AST  21  /  ALT  34  /  AlkPhos  113  05-13          RADIOLOGY:  < from: CT Lower Extremity w/ IV Cont, Left (05.13.19 @ 13:02) >    EXAM:  CT LWR EXT IC LT                            PROCEDURE DATE:  05/13/2019          INTERPRETATION:  EXAMINATION: CT of the left lower extremity with contrast    CLINICAL INFORMATION: Left lower extremity swelling. Evaluate for abscess    TECHNIQUE: Axial CT images were obtained through the left lower extremity   from the level of the hip to the level of the knee. Coronal and sagittal   reformatted images were made. Images were obtained after the intravenous   administration of 90 mL of Omnipaque 350. 10 mL were discarded    FINDINGS: There is nonspecific infiltration of the subcutaneous tissues   circumferentially about the lower extremity, most pronounced at the level   of the mid to distal femur, consistent with cellulitis in the appropriate   clinical setting. No well-formed/drainable collections are demonstrated.   No acute fracture or dislocation is demonstrated. There are no areas of   cortical destruction or periosteal reaction to suggest osteomyelitis. The   hip and knee joint spaces are maintained. There are nonspecific prominent   inguinal lymph nodes.    IMPRESSION: Nonspecific infiltration of the subcutaneous tissues of the   left lower extremity, most pronounced at the level of the mid to distal   femur, consistentwith cellulitis, in the appropriate clinical setting.   No well-formed/drainable collections are demonstrated.                ALFONSO VILLASENOR M.D., ATTENDING RADIOLOGIST  This document has been electronically signed. May 13 2019  1:59PM    < end of copied text >      Drug Screen:        RECOMMENDATIONS    [ ]  [place]  Reviewed and Copied to Chart

## 2019-05-14 NOTE — DISCHARGE NOTE PROVIDER - NSDCCPCAREPLAN_GEN_ALL_CORE_FT
PRINCIPAL DISCHARGE DIAGNOSIS  Diagnosis: Cellulitis  Assessment and Plan of Treatment: You came in with leg swelling and pain. You were underwent xray, duplex and ct angio that showed cellulitis. You were staretd on zyvox. You will be discharged after evening dose of zyvox for vancomycin for 3 doses for 1 week after dialysis.         SECONDARY DISCHARGE DIAGNOSES  Diagnosis: HTN (hypertension)  Assessment and Plan of Treatment: Please continue with your home medication    Diagnosis: DM (diabetes mellitus)  Assessment and Plan of Treatment: Please continue with your home medication    Diagnosis: ESRD on dialysis  Assessment and Plan of Treatment: Please continue with your home medication    Diagnosis: Leg pain  Assessment and Plan of Treatment: PRINCIPAL DISCHARGE DIAGNOSIS  Diagnosis: Cellulitis  Assessment and Plan of Treatment: You came in with leg swelling and pain. You were underwent xray, duplex and ct angio that showed cellulitis. You were staretd on zyvox. You will be discharged after evening dose of zyvox for vancomycin for 6 ( THREE TIMES A WEEK) doses for 2 week after dialysis. You were seen by pain  and you are not advised for any opoid medications. You were also found to have cocaine positive on your drug screen. Please follow up with [facility]  ON YOUR DISCHARGE        SECONDARY DISCHARGE DIAGNOSES  Diagnosis: HTN (hypertension)  Assessment and Plan of Treatment: Please continue with your home medication    Diagnosis: DM (diabetes mellitus)  Assessment and Plan of Treatment: Please continue with your home medication    Diagnosis: ESRD on dialysis  Assessment and Plan of Treatment: Please continue with your home medication    Diagnosis: Leg pain  Assessment and Plan of Treatment:

## 2019-05-14 NOTE — PROGRESS NOTE ADULT - ASSESSMENT
kneny nd eaxmined vsstable  on edge of bed having lunch not in any distress, c/o pain under lt knee.   afebrile  physical unchanged    ot has mild redness under and medial of knee  both legs ok  a/p ct femur no collection   surgery to clear  afebrile wbc nml  d/w ID pt can be discharged on iv vanco during HD for 2 weeks  out pt pcp, ortho if knee pain  if high fever go to er  PHYSICAL TX EVAL

## 2019-05-14 NOTE — CONSULT NOTE ADULT - PROBLEM SELECTOR RECOMMENDATION 9
- pt with cellulitis of extremity of left thigh.  Pt was on po abx at home. continue abx as per ID  - pt does not require iv opioids  - no nsaids due to esrd  - gabapentin must be renal dose and was not effective in past - will hold off  - acetaminophen prn  - lidocaine patch daily  - stool softeners   - Pt also does not have a pcp and does not follow up with a primary md.  Pt advised to follow up outpt with a primary md or pain mgt.    - pt with frequent admissions due to similar complaints and requesting iv opioids. Pt is stable for discharge and his diagnosis is not requiring iv or even po opioids at this time.  - ISTOP attached above - no significant findings  - recommend urine drug screen

## 2019-05-14 NOTE — DISCHARGE NOTE PROVIDER - HOSPITAL COURSE
39 Male with PMH of Type 2 DM, Migraine, ESRD on HD (left arm graft, M/W/F), Morbid obesity, HTN, Chr dyspnea (uses home O2 prn), Schizophrenia, Bacteremia due to foot cellulitis presented to ED c/o left leg pain and swelling since yesterday afternoon. Pt states that she was walking to a [employer] with his mother; after walking about 10 minutes he sat down and felt his left thigh "blow up" and become tense and painful. Pt states that he has chronic SOB and can only sleep while sitting up; these Sx have been unchanged. Pt was recently admitted in [facility] for rt scrotal mass (s/p excision) and Infected Cyst/very small Abscess of right side of scrotum (completed zyvox for 3 days, total 5 days). Pt denies fever, chill, cp, abd pain, n/v/c/d or other complains. ED course, pt vitals stable, labs noted wbc 10.5k, BUN/Cr 65/11.2, last HD on Friday. Pt was admitted for work up for leg swelling and was started on IV antibiotics. Duplex lower ext was negative for any DVT, Xray was negative for any acute changes. Pt also underwent ct angio of lower extremity and is negative for any abscess collection. Pt was seen by ID and was started on zyvox. Pt antibiotics can be cahnegd to vancomycin 3xweek as per ID attending. Surgery plan to do no intervention as ct is negative for abscess formation. Pt is stable for discharge as discussed with attending on case. 39 Male with PMH of Type 2 DM, Migraine, ESRD on HD (left arm graft, M/W/F), Morbid obesity, HTN, Chr dyspnea (uses home O2 prn), Schizophrenia, Bacteremia due to foot cellulitis presented to ED c/o left leg pain and swelling since yesterday afternoon. Pt states that she was walking to a [employer] with his mother; after walking about 10 minutes he sat down and felt his left thigh "blow up" and become tense and painful. Pt states that he has chronic SOB and can only sleep while sitting up; these Sx have been unchanged. Pt was recently admitted in [facility] for rt scrotal mass (s/p excision) and Infected Cyst/very small Abscess of right side of scrotum (completed zyvox for 3 days, total 5 days). Pt denies fever, chill, cp, abd pain, n/v/c/d or other complains. ED course, pt vitals stable, labs noted wbc 10.5k, BUN/Cr 65/11.2, last HD on Friday. Pt was admitted for work up for leg swelling and was started on IV antibiotics. Duplex lower ext was negative for any DVT, Xray was negative for any acute changes. Pt also underwent ct angio of lower extremity and is negative for any abscess collection. Pt was seen by ID and was started on zyvox.Pt was also seen by surgery who recommended no surgical intervention. Pt antibiotics can be changed to vancomycin 3xweek as per ID attending for 2 weeks. Surgery plan to do no intervention as ct is negative for abscess formation. Pt is stable for discharge as discussed with attending on case.    P 39 Male with PMH of Type 2 DM, Migraine, ESRD on HD (left arm graft, M/W/F), Morbid obesity, HTN, Chr dyspnea (uses home O2 prn), Schizophrenia, Bacteremia due to foot cellulitis presented to ED c/o left leg pain and swelling since yesterday afternoon. Pt states that she was walking to a [employer] with his mother; after walking about 10 minutes he sat down and felt his left thigh "blow up" and become tense and painful. Pt states that he has chronic SOB and can only sleep while sitting up; these Sx have been unchanged. [facility] (s/p excision) and Infected Cyst/very small Abscess of right side of scrotum (completed zyvox for 3 days, total 5 days). Pt denies fever, chill, cp, abd pain, n/v/c/d or other complains. ED course, pt vitals stable, labs noted wbc 10.5k, BUN/Cr 65/11.2, last HD on Friday. Pt was admitted for work up for leg swelling and was started on IV antibiotics. Duplex lower ext was negative for any DVT, Xray was negative for any acute changes. Pt also underwent ct angio of lower extremity and is negative for any abscess collection. Pt was seen by ID and was started on zyvox.Pt was also seen by surgery who recommended no surgical intervention. Pt antibiotics can be changed to vancomycin 3xweek as per ID attending for 2 weeks. Surgery plan to do no intervention as ct is negative for abscess formation. Pt is stable for discharge as discussed with attending on case.    Pt had no iv gayla e so will continue for zyvox for 3 more days and then continue vancomycin with dialysis

## 2019-05-14 NOTE — CONSULT NOTE ADULT - REASON FOR ADMISSION
left leg pain and swelling

## 2019-05-14 NOTE — CHART NOTE - NSCHARTNOTEFT_GEN_A_CORE
Pt was on oxygen in room at high flow, Pt oxyegn was removed and saturation was checked 15 min later and was 99%. Pt refused to ambulate for saturation while ambulating

## 2019-05-14 NOTE — CONSULT NOTE ADULT - SUBJECTIVE AND OBJECTIVE BOX
40 y/o morbidly obese male with Left Leg pain & swelling.  Patient with no fever, no white count  Left leg pain behind the knee & swelling  CT scan of the L leg is negative for fluid collection  US negative for DVT  No induration or fluctuance to suggest an abscess on exam  Recommend Orthopaedic consult  No surgical intervention  Discussed with Dr. Pickett who agrees

## 2019-05-14 NOTE — DISCHARGE NOTE PROVIDER - CARE PROVIDER_API CALL
Dino Byrne (MD)  Pain Medicine  [address] [place] [address]  Phone: [phone]  Fax: [phone]  Follow Up Time:

## 2019-05-14 NOTE — PROGRESS NOTE ADULT - SUBJECTIVE AND OBJECTIVE BOX
PGY 1 Note discussed with supervising resident and primary attending    Patient is a 39y old  Male who presents with a chief complaint of left leg pain and swelling (13 May 2019 18:37)      INTERVAL HPI/OVERNIGHT EVENTS: offers no new complaints; current symptoms resolving    MEDICATIONS  (STANDING):  ascorbic acid 500 milliGRAM(s) Oral daily  clopidogrel Tablet 75 milliGRAM(s) Oral daily  epoetin cyndie Injectable 44549 Unit(s) IV Push <User Schedule>  ferrous    sulfate 325 milliGRAM(s) Oral daily  folic acid 1 milliGRAM(s) Oral daily  furosemide    Tablet 80 milliGRAM(s) Oral daily  heparin  Injectable 5000 Unit(s) SubCutaneous every 12 hours  hydrALAZINE 10 milliGRAM(s) Oral three times a day  insulin glargine Injectable (LANTUS) 8 Unit(s) SubCutaneous at bedtime  insulin lispro (HumaLOG) corrective regimen sliding scale   SubCutaneous Before meals and at bedtime  linezolid  IVPB 600 milliGRAM(s) IV Intermittent every 12 hours  metoprolol tartrate 12.5 milliGRAM(s) Oral two times a day  pantoprazole    Tablet 40 milliGRAM(s) Oral before breakfast  QUEtiapine 200 milliGRAM(s) Oral daily  simvastatin 40 milliGRAM(s) Oral at bedtime    MEDICATIONS  (PRN):  acetaminophen   Tablet .. 650 milliGRAM(s) Oral every 6 hours PRN Temp greater or equal to 38C (100.4F), Mild Pain (1 - 3)      __________________________________________________  REVIEW OF SYSTEMS:    CONSTITUTIONAL: No fever,   EYES: no acute visual disturbances  NECK: No pain or stiffness  RESPIRATORY: No cough; No shortness of breath  CARDIOVASCULAR: No chest pain, no palpitations  GASTROINTESTINAL: No pain. No nausea or vomiting; No diarrhea   NEUROLOGICAL: No headache or numbness, no tremors  MUSCULOSKELETAL: No joint pain, no muscle pain  GENITOURINARY: no dysuria, no frequency, no hesitancy  PSYCHIATRY: no depression , no anxiety  ALL OTHER  ROS negative        Vital Signs Last 24 Hrs  T(C): 36.8 (14 May 2019 05:38), Max: 36.8 (13 May 2019 15:15)  T(F): 98.2 (14 May 2019 05:38), Max: 98.3 (13 May 2019 15:15)  HR: 87 (14 May 2019 05:38) (76 - 87)  BP: 135/79 (14 May 2019 05:38) (135/79 - 147/85)  BP(mean): --  RR: 18 (14 May 2019 05:38) (17 - 18)  SpO2: 100% (14 May 2019 05:38) (100% - 100%)    ________________________________________________  PHYSICAL EXAM:  GENERAL: NAD  HEENT: Normocephalic;  conjunctivae and sclerae clear; moist mucous membranes;   NECK : supple  CHEST/LUNG: Clear to auscultation bilaterally with good air entry   HEART: S1 S2  regular; no murmurs, gallops or rubs  ABDOMEN: Soft, Nontender, Nondistended; Bowel sounds present  EXTREMITIES: no cyanosis; no edema; no calf tenderness  SKIN: warm and dry; no rash  NERVOUS SYSTEM:  Awake and alert; Oriented  to place, person and time ; no new deficits    _________________________________________________  LABS:                        8.5    8.79  )-----------( 278      ( 14 May 2019 07:25 )             27.5     05-14    136  |  98  |  51<H>  ----------------------------<  110<H>  4.9   |  27  |  9.28<H>    Ca    7.7<L>      14 May 2019 07:25  Phos  6.8     05-14  Mg     2.2     05-14    TPro  7.6  /  Alb  3.3<L>  /  TBili  0.3  /  DBili  x   /  AST  21  /  ALT  34  /  AlkPhos  113  05-13        CAPILLARY BLOOD GLUCOSE      POCT Blood Glucose.: 107 mg/dL (14 May 2019 08:19)  POCT Blood Glucose.: 123 mg/dL (13 May 2019 22:45)  POCT Blood Glucose.: 143 mg/dL (13 May 2019 16:50)  POCT Blood Glucose.: 158 mg/dL (13 May 2019 12:08)        RADIOLOGY & ADDITIONAL TESTS:    Imaging Personally Reviewed:  YES/NO    Consultant(s) Notes Reviewed:   YES/ No    Care Discussed with Consultants :     Plan of care was discussed with patient and /or primary care giver; all questions and concerns were addressed and care was aligned with patient's wishes. PGY 1 Note discussed with supervising resident and primary attending    Patient is a 39y old  Male who presents with a chief complaint of left leg pain and swelling (13 May 2019 18:37)      INTERVAL HPI/OVERNIGHT EVENTS: leg swelling and pain    MEDICATIONS  (STANDING):  ascorbic acid 500 milliGRAM(s) Oral daily  clopidogrel Tablet 75 milliGRAM(s) Oral daily  epoetin cyndie Injectable 93580 Unit(s) IV Push <User Schedule>  ferrous    sulfate 325 milliGRAM(s) Oral daily  folic acid 1 milliGRAM(s) Oral daily  furosemide    Tablet 80 milliGRAM(s) Oral daily  heparin  Injectable 5000 Unit(s) SubCutaneous every 12 hours  hydrALAZINE 10 milliGRAM(s) Oral three times a day  insulin glargine Injectable (LANTUS) 8 Unit(s) SubCutaneous at bedtime  insulin lispro (HumaLOG) corrective regimen sliding scale   SubCutaneous Before meals and at bedtime  linezolid  IVPB 600 milliGRAM(s) IV Intermittent every 12 hours  metoprolol tartrate 12.5 milliGRAM(s) Oral two times a day  pantoprazole    Tablet 40 milliGRAM(s) Oral before breakfast  QUEtiapine 200 milliGRAM(s) Oral daily  simvastatin 40 milliGRAM(s) Oral at bedtime    MEDICATIONS  (PRN):  acetaminophen   Tablet .. 650 milliGRAM(s) Oral every 6 hours PRN Temp greater or equal to 38C (100.4F), Mild Pain (1 - 3)      __________________________________________________  REVIEW OF SYSTEMS:    CONSTITUTIONAL: No fever,   EYES: no acute visual disturbances  NECK: No pain or stiffness  RESPIRATORY: No cough; No shortness of breath  CARDIOVASCULAR: No chest pain, no palpitations  GASTROINTESTINAL: No pain. No nausea or vomiting; No diarrhea   NEUROLOGICAL: No headache or numbness, no tremors  MUSCULOSKELETAL: No joint pain, no muscle pain  GENITOURINARY: no dysuria, no frequency, no hesitancy  PSYCHIATRY: no depression , no anxiety  ALL OTHER  ROS negative        Vital Signs Last 24 Hrs  T(C): 36.8 (14 May 2019 05:38), Max: 36.8 (13 May 2019 15:15)  T(F): 98.2 (14 May 2019 05:38), Max: 98.3 (13 May 2019 15:15)  HR: 87 (14 May 2019 05:38) (76 - 87)  BP: 135/79 (14 May 2019 05:38) (135/79 - 147/85)  BP(mean): --  RR: 18 (14 May 2019 05:38) (17 - 18)  SpO2: 100% (14 May 2019 05:38) (100% - 100%)    ________________________________________________  PHYSICAL EXAM:  GENERAL: NAD  HEENT: Normocephalic;  conjunctivae and sclerae clear; moist mucous membranes;   NECK : supple  CHEST/LUNG: Clear to auscultation bilaterally with good air entry   HEART: S1 S2  regular; no murmurs, gallops or rubs  ABDOMEN: Soft, Nontender, Nondistended; Bowel sounds present  EXTREMITIES: no cyanosis; no edema; no calf tenderness  SKIN: warm and dry; no rash  NERVOUS SYSTEM:  Awake and alert; Oriented  to place, person and time ; no new deficits    _________________________________________________  LABS:                        8.5    8.79  )-----------( 278      ( 14 May 2019 07:25 )             27.5     05-14    136  |  98  |  51<H>  ----------------------------<  110<H>  4.9   |  27  |  9.28<H>    Ca    7.7<L>      14 May 2019 07:25  Phos  6.8     05-14  Mg     2.2     05-14    TPro  7.6  /  Alb  3.3<L>  /  TBili  0.3  /  DBili  x   /  AST  21  /  ALT  34  /  AlkPhos  113  05-13        CAPILLARY BLOOD GLUCOSE      POCT Blood Glucose.: 107 mg/dL (14 May 2019 08:19)  POCT Blood Glucose.: 123 mg/dL (13 May 2019 22:45)  POCT Blood Glucose.: 143 mg/dL (13 May 2019 16:50)  POCT Blood Glucose.: 158 mg/dL (13 May 2019 12:08)        RADIOLOGY & ADDITIONAL TESTS:    Imaging Personally Reviewed:  YES/NO    Consultant(s) Notes Reviewed:   YES/ No    Care Discussed with Consultants :     Plan of care was discussed with patient and /or primary care giver; all questions and concerns were addressed and care was aligned with patient's wishes.

## 2019-05-14 NOTE — PROGRESS NOTE ADULT - SUBJECTIVE AND OBJECTIVE BOX
[facility] : Progress Note :: [phone], (office [phone]),   Dr La / Dr Hui / Dr Barber / Dr Villalobos / Dr Hang CASTELLANO / Dr Mehta / Dr Sanchez / Dr Alber dumont  _____________________________________________________________________________________________ \  CT scan thigh with cellulitis.    aspirin (Short breath)  aspirin (Swelling)  penicillin (Short breath)  penicillins (Swelling)  shellfish (Unknown)    Hospital Medications:   MEDICATIONS  (STANDING):  ascorbic acid 500 milliGRAM(s) Oral daily  clopidogrel Tablet 75 milliGRAM(s) Oral daily  epoetin cyndie Injectable 47975 Unit(s) IV Push <User Schedule>  ferrous    sulfate 325 milliGRAM(s) Oral daily  folic acid 1 milliGRAM(s) Oral daily  furosemide    Tablet 80 milliGRAM(s) Oral daily  heparin  Injectable 5000 Unit(s) SubCutaneous every 12 hours  hydrALAZINE 10 milliGRAM(s) Oral three times a day  insulin glargine Injectable (LANTUS) 8 Unit(s) SubCutaneous at bedtime  insulin lispro (HumaLOG) corrective regimen sliding scale   SubCutaneous Before meals and at bedtime  linezolid  IVPB 600 milliGRAM(s) IV Intermittent every 12 hours  metoprolol tartrate 12.5 milliGRAM(s) Oral two times a day  pantoprazole    Tablet 40 milliGRAM(s) Oral before breakfast  QUEtiapine 200 milliGRAM(s) Oral daily  simvastatin 40 milliGRAM(s) Oral at bedtime        VITALS:  T(F): 98.2 (05-14-19 @ 05:38), Max: 98.3 (05-13-19 @ 15:15)  HR: 87 (05-14-19 @ 05:38)  BP: 135/79 (05-14-19 @ 05:38)  RR: 18 (05-14-19 @ 05:38)  SpO2: 100% (05-14-19 @ 05:38)  Wt(kg): --    05-13 @ 07:01  -  05-14 @ 07:00  --------------------------------------------------------  IN: 240 mL / OUT: 700 mL / NET: -460 mL        PHYSICAL EXAM:  Constitutional: NAD  HEENT: anicteric sclera, oropharynx clear.  Neck: No JVD  Respiratory: CTAB, no wheezes, rales or rhonchi  Cardiovascular: S1, S2, RRR  Gastrointestinal: BS+, soft, NT/ND  Extremities: peripheral edema++. left thigh tender, swollen  Neurological: A/O x 3, no focal deficits  : No CVA tenderness. No hernandez.   Skin: No rashes  Vascular Access: LUEAVF     LABS:  05-14    136  |  98  |  51<H>  ----------------------------<  110<H>  4.9   |  27  |  9.28<H>    Ca    7.7<L>      14 May 2019 07:25  Phos  6.8     05-14  Mg     2.2     05-14    TPro  7.6  /  Alb  3.3<L>  /  TBili  0.3  /  DBili      /  AST  21  /  ALT  34  /  AlkPhos  113  05-13    Creatinine Trend: 9.28 <--, 11.20 <--                        8.5    8.79  )-----------( 278      ( 14 May 2019 07:25 )             27.5     Urine Studies:      RADIOLOGY & ADDITIONAL STUDIES:

## 2019-05-14 NOTE — PROGRESS NOTE ADULT - ASSESSMENT
# ESRD. s/p CT thigh with contrast showing cellulitis. on ABX  # hypervolemia. asked control fluid and salt intake. UF on HD . cont lasix. d/w patient at [facility] regarding compliance with dialysis regimen  # anemia of CKD procrit on HD  renal osteodystrophy. restart renagel- ordered

## 2019-05-14 NOTE — PROGRESS NOTE ADULT - ASSESSMENT
39 Male with PMH of Type 2 DM, Migraine, ESRD on HD (left arm graft, M/W/F), Morbid obesity, HTN, Chr dyspnea (uses home O2 prn), Schizophrenia, Bacteremia due to foot cellulitis presented to ED c/o left leg pain and swelling since yesterday afternoon. Pt states that she was walking to a [employer] with his mother; after walking about 10 minutes he sat down and felt his left thigh "blow up" and become tense and painful. ED course, pt vitals stable, labs noted wbc 10.5k, BUN/Cr 65/11.2, last HD on Friday. Admit to medicine for Lt thigh cellulitis.

## 2019-05-15 ENCOUNTER — TRANSCRIPTION ENCOUNTER (OUTPATIENT)
Age: 39
End: 2019-05-15

## 2019-05-15 VITALS
HEART RATE: 90 BPM | SYSTOLIC BLOOD PRESSURE: 142 MMHG | DIASTOLIC BLOOD PRESSURE: 84 MMHG | TEMPERATURE: 99 F | RESPIRATION RATE: 18 BRPM

## 2019-05-15 LAB
AMPHET UR-MCNC: NEGATIVE — SIGNIFICANT CHANGE UP
ANION GAP SERPL CALC-SCNC: 12 MMOL/L — SIGNIFICANT CHANGE UP (ref 5–17)
BARBITURATES UR SCN-MCNC: NEGATIVE — SIGNIFICANT CHANGE UP
BENZODIAZ UR-MCNC: NEGATIVE — SIGNIFICANT CHANGE UP
BUN SERPL-MCNC: 65 MG/DL — HIGH (ref 7–18)
CALCIUM SERPL-MCNC: 7.3 MG/DL — LOW (ref 8.4–10.5)
CHLORIDE SERPL-SCNC: 96 MMOL/L — SIGNIFICANT CHANGE UP (ref 96–108)
CO2 SERPL-SCNC: 25 MMOL/L — SIGNIFICANT CHANGE UP (ref 22–31)
COCAINE METAB.OTHER UR-MCNC: POSITIVE
CREAT SERPL-MCNC: 11.4 MG/DL — HIGH (ref 0.5–1.3)
GLUCOSE BLDC GLUCOMTR-MCNC: 126 MG/DL — HIGH (ref 70–99)
GLUCOSE BLDC GLUCOMTR-MCNC: 140 MG/DL — HIGH (ref 70–99)
GLUCOSE BLDC GLUCOMTR-MCNC: 144 MG/DL — HIGH (ref 70–99)
GLUCOSE SERPL-MCNC: 151 MG/DL — HIGH (ref 70–99)
HCT VFR BLD CALC: 26.3 % — LOW (ref 39–50)
HGB BLD-MCNC: 8.3 G/DL — LOW (ref 13–17)
MCHC RBC-ENTMCNC: 30.6 PG — SIGNIFICANT CHANGE UP (ref 27–34)
MCHC RBC-ENTMCNC: 31.6 GM/DL — LOW (ref 32–36)
MCV RBC AUTO: 97 FL — SIGNIFICANT CHANGE UP (ref 80–100)
METHADONE UR-MCNC: NEGATIVE — SIGNIFICANT CHANGE UP
NRBC # BLD: 0 /100 WBCS — SIGNIFICANT CHANGE UP (ref 0–0)
OPIATES UR-MCNC: NEGATIVE — SIGNIFICANT CHANGE UP
PCP SPEC-MCNC: SIGNIFICANT CHANGE UP
PCP UR-MCNC: NEGATIVE — SIGNIFICANT CHANGE UP
PLATELET # BLD AUTO: 268 K/UL — SIGNIFICANT CHANGE UP (ref 150–400)
POTASSIUM SERPL-MCNC: 4.9 MMOL/L — SIGNIFICANT CHANGE UP (ref 3.5–5.3)
POTASSIUM SERPL-SCNC: 4.9 MMOL/L — SIGNIFICANT CHANGE UP (ref 3.5–5.3)
RBC # BLD: 2.71 M/UL — LOW (ref 4.2–5.8)
RBC # FLD: 14.9 % — HIGH (ref 10.3–14.5)
SODIUM SERPL-SCNC: 133 MMOL/L — LOW (ref 135–145)
THC UR QL: NEGATIVE — SIGNIFICANT CHANGE UP
WBC # BLD: 7.86 K/UL — SIGNIFICANT CHANGE UP (ref 3.8–10.5)
WBC # FLD AUTO: 7.86 K/UL — SIGNIFICANT CHANGE UP (ref 3.8–10.5)

## 2019-05-15 PROCEDURE — 99285 EMERGENCY DEPT VISIT HI MDM: CPT | Mod: 25

## 2019-05-15 PROCEDURE — 73562 X-RAY EXAM OF KNEE 3: CPT

## 2019-05-15 PROCEDURE — 80048 BASIC METABOLIC PNL TOTAL CA: CPT

## 2019-05-15 PROCEDURE — 36415 COLL VENOUS BLD VENIPUNCTURE: CPT

## 2019-05-15 PROCEDURE — 82746 ASSAY OF FOLIC ACID SERUM: CPT

## 2019-05-15 PROCEDURE — 84443 ASSAY THYROID STIM HORMONE: CPT

## 2019-05-15 PROCEDURE — 84100 ASSAY OF PHOSPHORUS: CPT

## 2019-05-15 PROCEDURE — 80061 LIPID PANEL: CPT

## 2019-05-15 PROCEDURE — 80307 DRUG TEST PRSMV CHEM ANLYZR: CPT

## 2019-05-15 PROCEDURE — 82962 GLUCOSE BLOOD TEST: CPT

## 2019-05-15 PROCEDURE — 83735 ASSAY OF MAGNESIUM: CPT

## 2019-05-15 PROCEDURE — 93971 EXTREMITY STUDY: CPT

## 2019-05-15 PROCEDURE — 85027 COMPLETE CBC AUTOMATED: CPT

## 2019-05-15 PROCEDURE — 82607 VITAMIN B-12: CPT

## 2019-05-15 PROCEDURE — 73701 CT LOWER EXTREMITY W/DYE: CPT

## 2019-05-15 PROCEDURE — 87040 BLOOD CULTURE FOR BACTERIA: CPT

## 2019-05-15 PROCEDURE — 99261: CPT

## 2019-05-15 PROCEDURE — 80053 COMPREHEN METABOLIC PANEL: CPT

## 2019-05-15 PROCEDURE — 83036 HEMOGLOBIN GLYCOSYLATED A1C: CPT

## 2019-05-15 RX ORDER — AMLODIPINE BESYLATE 2.5 MG/1
2.5 TABLET ORAL DAILY
Refills: 0 | Status: DISCONTINUED | OUTPATIENT
Start: 2019-05-15 | End: 2019-05-15

## 2019-05-15 RX ORDER — LIDOCAINE 4 G/100G
1 CREAM TOPICAL
Qty: 5 | Refills: 0
Start: 2019-05-15 | End: 2019-05-19

## 2019-05-15 RX ORDER — LINEZOLID 600 MG/300ML
1 INJECTION, SOLUTION INTRAVENOUS
Qty: 6 | Refills: 0
Start: 2019-05-15 | End: 2019-05-17

## 2019-05-15 RX ORDER — VANCOMYCIN HCL 1 G
1250 VIAL (EA) INTRAVENOUS ONCE
Refills: 0 | Status: DISCONTINUED | OUTPATIENT
Start: 2019-05-15 | End: 2019-05-15

## 2019-05-15 RX ORDER — AMLODIPINE BESYLATE 2.5 MG/1
1 TABLET ORAL
Qty: 30 | Refills: 0
Start: 2019-05-15 | End: 2019-06-13

## 2019-05-15 RX ORDER — LINEZOLID 600 MG/300ML
600 INJECTION, SOLUTION INTRAVENOUS EVERY 12 HOURS
Refills: 0 | Status: DISCONTINUED | OUTPATIENT
Start: 2019-05-15 | End: 2019-05-15

## 2019-05-15 RX ADMIN — Medication 12.5 MILLIGRAM(S): at 17:06

## 2019-05-15 RX ADMIN — SEVELAMER CARBONATE 1600 MILLIGRAM(S): 2400 POWDER, FOR SUSPENSION ORAL at 17:06

## 2019-05-15 RX ADMIN — HEPARIN SODIUM 5000 UNIT(S): 5000 INJECTION INTRAVENOUS; SUBCUTANEOUS at 05:35

## 2019-05-15 RX ADMIN — LINEZOLID 600 MILLIGRAM(S): 600 INJECTION, SOLUTION INTRAVENOUS at 18:49

## 2019-05-15 RX ADMIN — Medication 10 MILLIGRAM(S): at 16:02

## 2019-05-15 RX ADMIN — AMLODIPINE BESYLATE 2.5 MILLIGRAM(S): 2.5 TABLET ORAL at 17:06

## 2019-05-15 RX ADMIN — QUETIAPINE FUMARATE 200 MILLIGRAM(S): 200 TABLET, FILM COATED ORAL at 16:04

## 2019-05-15 RX ADMIN — LIDOCAINE 1 PATCH: 4 CREAM TOPICAL at 16:05

## 2019-05-15 RX ADMIN — Medication 12.5 MILLIGRAM(S): at 05:35

## 2019-05-15 RX ADMIN — Medication 500 MILLIGRAM(S): at 16:07

## 2019-05-15 RX ADMIN — SIMVASTATIN 40 MILLIGRAM(S): 20 TABLET, FILM COATED ORAL at 16:07

## 2019-05-15 RX ADMIN — INSULIN GLARGINE 8 UNIT(S): 100 INJECTION, SOLUTION SUBCUTANEOUS at 16:01

## 2019-05-15 RX ADMIN — ERYTHROPOIETIN 10000 UNIT(S): 10000 INJECTION, SOLUTION INTRAVENOUS; SUBCUTANEOUS at 12:37

## 2019-05-15 RX ADMIN — SEVELAMER CARBONATE 1600 MILLIGRAM(S): 2400 POWDER, FOR SUSPENSION ORAL at 08:12

## 2019-05-15 RX ADMIN — LIDOCAINE 1 PATCH: 4 CREAM TOPICAL at 07:22

## 2019-05-15 RX ADMIN — PANTOPRAZOLE SODIUM 40 MILLIGRAM(S): 20 TABLET, DELAYED RELEASE ORAL at 05:35

## 2019-05-15 RX ADMIN — CLOPIDOGREL BISULFATE 75 MILLIGRAM(S): 75 TABLET, FILM COATED ORAL at 16:03

## 2019-05-15 RX ADMIN — Medication 10 MILLIGRAM(S): at 05:36

## 2019-05-15 RX ADMIN — LINEZOLID 300 MILLIGRAM(S): 600 INJECTION, SOLUTION INTRAVENOUS at 05:36

## 2019-05-15 RX ADMIN — Medication 1 MILLIGRAM(S): at 16:25

## 2019-05-15 RX ADMIN — Medication 80 MILLIGRAM(S): at 05:52

## 2019-05-15 RX ADMIN — Medication 325 MILLIGRAM(S): at 16:25

## 2019-05-15 NOTE — PROGRESS NOTE ADULT - ASSESSMENT
Left leg cellulitis - improving    plan - switch abxs to zyvox 600mgs po bid x 2 days until he can get dialysis on Friday and can get Vancomycin 1250mgs iv on Friday and Monday at his dialysis center to complete his therapy  dc planning for today.  reconsult prn.

## 2019-05-15 NOTE — PROGRESS NOTE ADULT - ASSESSMENT
kenny nd examined comfortable not in any distress, as per pt pain is better in Lt thigh. no cp or sob  , no heart burns , no rectal bleed ,  vsstable except sbp on higher sided   amlodipine 2.5 added.   lt knee lt thigh, non tender, not swollen, no tender.  hgb 8.3  in jan was about 10  a/p cellulitis  on vanco.  doing much  surgical consult appreciated  no intervention.  as per my d/w Id pt will be on vanco during HD.  advised to see Orthopedic. come back if high fever ,  swelling  cocain pos  as per him from 2-3 years doing cocain to relieve pain , i asked her where is pain says all over. advised cocain is not pain releiver, can cause, mi, stroke, PE, rhabdo try to avoid  esrd on hd ad vised renal diet  morbid obesity bmi over 42  advised diet , exercise wt reduction  s/e related of obesity explained   i have told him in past to see bariatric surgeon.  dm, htn cont same   pt is morbidly obese, dm, esrd, coccain use  has high risk of sudden death  f/u ophthalmo, podiatry, ortho, GI for anemia ( esrd on hd on epogen  schizophrenia  as per pt , he follow with psych every month. kenny nd examined comfortable not in any distress, as per pt pain is better in Lt thigh. no cp or sob  , no heart burns , no rectal bleed ,  vsstable except sbp on higher sided   amlodipine 2.5 added.   lt knee lt thigh, non tender, not swollen, no tender.  hgb 8.3  in jan was about 10  a/p cellulitis  on vanco.  doing much  surgical consult appreciated  no intervention.  as per my d/w Id pt will be on vanco during HD.  advised to see Orthopedic. come back if high fever ,  swelling  cocain pos  as per him from 2-3 years doing cocain to relieve pain , i asked her where is pain says all over. advised cocain is not pain releiver, can cause, mi, stroke, PE, rhabdo try to avoid  esrd on hd ad vised renal diet  morbid obesity bmi over 42  advised diet , exercise wt reduction  s/e related of obesity explained   i have told him in past to see bariatric surgeon.  dm, htn cont same   pt is morbidly obese, dm, esrd, coccain use  has high risk of sudden death  f/u ophthalmo, podiatry, ortho, GI for anemia ( esrd on hd on epogen  schizophrenia  as per pt , he follow with psych every month.   pt has appointment in [facility] for sleep apnea studies , has cancelled studies  advised again. possible needs cpap

## 2019-05-15 NOTE — PROGRESS NOTE ADULT - SUBJECTIVE AND OBJECTIVE BOX
HPI:  39 Male with PMH of Type 2 DM, Migraine, ESRD on HD (left arm graft, M/W/F), Morbid obesity, HTN, Chr dyspnea (uses home O2 prn), Schizophrenia, Bacteremia due to foot cellulitis presented to ED c/o left leg pain and swelling since yesterday afternoon. Pt states that she was walking to a [employer] with his mother; after walking about 10 minutes he sat down and felt his left thigh "blow up" and become tense and painful. Pt states that he has chronic SOB and can only sleep while sitting up; these Sx have been unchanged. Pt was recently admitted in [facility] for rt scrotal mass (s/p excision) and Infected Cyst/very small Abscess of right side of scrotum (completed zyvox for 3 days, total 5 days). Pt denies fever, chill, cp, abd pain, n/v/c/d or other complains.     ED course, pt vitals stable, labs noted wbc 10.5k, BUN/Cr 65/11.2, last HD on Friday.     FH: Sister has RA and Lupus, Father had Testicular mass  SH: Smokes, drinks alcohol and uses other drugs on weekends. Lives with family. (13 May 2019 08:15)      Patient is a 39y old  Male who presents with a chief complaint of left leg pain and swelling (15 May 2019 16:23)      INTERVAL HPI/OVERNIGHT EVENTS:  T(C): 37 (05-15-19 @ 15:58), Max: 37 (05-15-19 @ 11:15)  HR: 90 (05-15-19 @ 15:58) (82 - 92)  BP: 142/84 (05-15-19 @ 15:58) (139/65 - 160/93)  RR: 18 (05-15-19 @ 15:58) (17 - 20)  SpO2: 100% (05-15-19 @ 05:11) (97% - 100%)  Wt(kg): --  I&O's Summary    14 May 2019 07:01  -  15 May 2019 07:00  --------------------------------------------------------  IN: 600 mL / OUT: 600 mL / NET: 0 mL        REVIEW OF SYSTEMS: denies fever, chills, SOB, palpitations, chest pain, abdominal pain, nausea, vomitting, diarrhea, constipation, dizziness    MEDICATIONS  (STANDING):  amLODIPine   Tablet 2.5 milliGRAM(s) Oral daily  ascorbic acid 500 milliGRAM(s) Oral daily  clopidogrel Tablet 75 milliGRAM(s) Oral daily  epoetin cyndie Injectable 13221 Unit(s) IV Push <User Schedule>  ferrous    sulfate 325 milliGRAM(s) Oral daily  folic acid 1 milliGRAM(s) Oral daily  furosemide    Tablet 80 milliGRAM(s) Oral daily  heparin  Injectable 5000 Unit(s) SubCutaneous every 12 hours  hydrALAZINE 10 milliGRAM(s) Oral three times a day  insulin glargine Injectable (LANTUS) 8 Unit(s) SubCutaneous at bedtime  insulin lispro (HumaLOG) corrective regimen sliding scale   SubCutaneous Before meals and at bedtime  lidocaine   Patch 1 Patch Transdermal daily  linezolid    Tablet 600 milliGRAM(s) Oral every 12 hours  metoprolol tartrate 12.5 milliGRAM(s) Oral two times a day  pantoprazole    Tablet 40 milliGRAM(s) Oral before breakfast  QUEtiapine 200 milliGRAM(s) Oral daily  sevelamer carbonate 1600 milliGRAM(s) Oral three times a day with meals  simvastatin 40 milliGRAM(s) Oral at bedtime    MEDICATIONS  (PRN):  acetaminophen   Tablet .. 650 milliGRAM(s) Oral every 6 hours PRN Temp greater or equal to 38C (100.4F), Mild Pain (1 - 3)      PHYSICAL EXAM:  GENERAL: NAD, well-groomed, well-developed  HEAD:  Atraumatic, Normocephalic  EYES: EOMI, PERRLA, conjunctiva and sclera clear  ENMT: No tonsillar erythema, exudates, or enlargement; Moist mucous membranes, Good dentition, No lesions  NECK: Supple, No JVD, Normal thyroid  NERVOUS SYSTEM:  Alert & Oriented X3, Good concentration; Motor Strength 5/5 B/L upper and lower extremities; DTRs 2+ intact and symmetric  CHEST/LUNG: Clear to percussion bilaterally; No rales, rhonchi, wheezing, or rubs  HEART: Regular rate and rhythm; No murmurs, rubs, or gallops  ABDOMEN: Soft, Nontender, Nondistended; Bowel sounds present  EXTREMITIES:  2+ Peripheral Pulses, No clubbing, cyanosis, or edema  LYMPH: No lymphadenopathy noted  SKIN: No rashes or lesions  LABS:                        8.3    7.86  )-----------( 268      ( 15 May 2019 11:08 )             26.3     05-15    133<L>  |  96  |  65<H>  ----------------------------<  151<H>  4.9   |  25  |  11.40<H>    Ca    7.3<L>      15 May 2019 11:08  Phos  6.8     05-14  Mg     2.2     05-14          CAPILLARY BLOOD GLUCOSE      POCT Blood Glucose.: 140 mg/dL (15 May 2019 16:53)  POCT Blood Glucose.: 126 mg/dL (15 May 2019 12:51)  POCT Blood Glucose.: 144 mg/dL (15 May 2019 08:03)  POCT Blood Glucose.: 134 mg/dL (14 May 2019 21:38)

## 2019-05-15 NOTE — PROGRESS NOTE ADULT - PROVIDER SPECIALTY LIST ADULT
Infectious Disease
Internal Medicine
Nephrology
Internal Medicine

## 2019-05-15 NOTE — PROGRESS NOTE ADULT - SUBJECTIVE AND OBJECTIVE BOX
39y Male who has lost all IV access , he has less left leg swelling, no fevers or chills, no diarrhea. He got dialyzed today. He is for discharge today.    Meds:    Allergies    aspirin (Short breath)  aspirin (Swelling)  penicillin (Short breath)  penicillins (Swelling)  shellfish (Unknown)    Intolerances        VITALS:  Vital Signs Last 24 Hrs  T(C): 36.9 (15 May 2019 15:20), Max: 37 (15 May 2019 11:15)  T(F): 98.5 (15 May 2019 15:20), Max: 98.6 (15 May 2019 11:15)  HR: 91 (15 May 2019 15:20) (82 - 92)  BP: 143/76 (15 May 2019 15:20) (139/65 - 160/93)  BP(mean): --  RR: 20 (15 May 2019 15:20) (17 - 20)  SpO2: 100% (15 May 2019 05:11) (97% - 100%)    LABS/DIAGNOSTIC TESTS:                          8.3    7.86  )-----------( 268      ( 15 May 2019 11:08 )             26.3         05-15    133<L>  |  96  |  65<H>  ----------------------------<  151<H>  4.9   |  25  |  11.40<H>    Ca    7.3<L>      15 May 2019 11:08  Phos  6.8     05-14  Mg     2.2     05-14            CULTURES: .Blood  05-13 @ 22:21   No growth to date.  --  --      .Blood  05-13 @ 14:24   No growth to date.  --  --                RADIOLOGY:      ROS:  [  ] UNABLE TO ELICIT

## 2019-05-15 NOTE — PROGRESS NOTE ADULT - SUBJECTIVE AND OBJECTIVE BOX
HPI:  39 Male with PMH of Type 2 DM, Migraine, ESRD on HD (left arm graft, M/W/F), Morbid obesity, HTN, Chr dyspnea (uses home O2 prn), Schizophrenia, Bacteremia due to foot cellulitis presented to ED c/o left leg pain and swelling since yesterday afternoon. Pt states that she was walking to a [place] with his mother; after walking about 10 minutes he sat down and felt his left thigh "blow up" and become tense and painful. Pt states that he has chronic SOB and can only sleep while sitting up; these Sx have been unchanged. Pt was recently admitted in [facility] for rt scrotal mass (s/p excision) and Infected Cyst/very small Abscess of right side of scrotum (completed zyvox for 3 days, total 5 days). Pt denies fever, chill, cp, abd pain, n/v/c/d or other complains.     ED course, pt vitals stable, labs noted wbc 10.5k, BUN/Cr 65/11.2, last HD on Friday.     FH: Sister has RA and Lupus, Father had Testicular mass  SH: Smokes, drinks alcohol and uses other drugs on weekends. Lives with family. (13 May 2019 08:15)      Patient is a 39y old  Male who presents with a chief complaint of left leg pain and swelling (14 May 2019 16:59)      INTERVAL HPI/OVERNIGHT EVENTS:  T(C): 36.9 (05-15-19 @ 15:20), Max: 37 (05-15-19 @ 11:15)  HR: 91 (05-15-19 @ 15:20) (82 - 92)  BP: 143/76 (05-15-19 @ 15:20) (139/65 - 160/93)  RR: 20 (05-15-19 @ 15:20) (17 - 20)  SpO2: 100% (05-15-19 @ 05:11) (97% - 100%)  Wt(kg): --  I&O's Summary    14 May 2019 07:01  -  15 May 2019 07:00  --------------------------------------------------------  IN: 600 mL / OUT: 600 mL / NET: 0 mL        REVIEW OF SYSTEMS: denies fever, chills, SOB, palpitations, chest pain, abdominal pain, nausea, vomitting, diarrhea, constipation, dizziness    MEDICATIONS  (STANDING):  amLODIPine   Tablet 2.5 milliGRAM(s) Oral daily  ascorbic acid 500 milliGRAM(s) Oral daily  clopidogrel Tablet 75 milliGRAM(s) Oral daily  epoetin cyndie Injectable 89086 Unit(s) IV Push <User Schedule>  ferrous    sulfate 325 milliGRAM(s) Oral daily  folic acid 1 milliGRAM(s) Oral daily  furosemide    Tablet 80 milliGRAM(s) Oral daily  heparin  Injectable 5000 Unit(s) SubCutaneous every 12 hours  hydrALAZINE 10 milliGRAM(s) Oral three times a day  insulin glargine Injectable (LANTUS) 8 Unit(s) SubCutaneous at bedtime  insulin lispro (HumaLOG) corrective regimen sliding scale   SubCutaneous Before meals and at bedtime  lidocaine   Patch 1 Patch Transdermal daily  metoprolol tartrate 12.5 milliGRAM(s) Oral two times a day  pantoprazole    Tablet 40 milliGRAM(s) Oral before breakfast  QUEtiapine 200 milliGRAM(s) Oral daily  sevelamer carbonate 1600 milliGRAM(s) Oral three times a day with meals  simvastatin 40 milliGRAM(s) Oral at bedtime  vancomycin  IVPB 1250 milliGRAM(s) IV Intermittent once    MEDICATIONS  (PRN):  acetaminophen   Tablet .. 650 milliGRAM(s) Oral every 6 hours PRN Temp greater or equal to 38C (100.4F), Mild Pain (1 - 3)      PHYSICAL EXAM:  GENERAL: NAD, well-groomed, well-developed  HEAD:  Atraumatic, Normocephalic  EYES: EOMI, PERRLA, conjunctiva and sclera clear  ENMT: No tonsillar erythema, exudates, or enlargement; Moist mucous membranes, Good dentition, No lesions  NECK: Supple, No JVD, Normal thyroid  NERVOUS SYSTEM:  Alert & Oriented X3, Good concentration; Motor Strength 5/5 B/L upper and lower extremities; DTRs 2+ intact and symmetric  CHEST/LUNG: Clear to percussion bilaterally; No rales, rhonchi, wheezing, or rubs  HEART: Regular rate and rhythm; No murmurs, rubs, or gallops  ABDOMEN: Soft, Nontender, Nondistended; Bowel sounds present  EXTREMITIES:  2+ Peripheral Pulses, No clubbing, cyanosis, or edema  LYMPH: No lymphadenopathy noted  SKIN: No rashes or lesions  LABS:                        8.3    7.86  )-----------( 268      ( 15 May 2019 11:08 )             26.3     05-15    133<L>  |  96  |  65<H>  ----------------------------<  151<H>  4.9   |  25  |  11.40<H>    Ca    7.3<L>      15 May 2019 11:08  Phos  6.8     05-14  Mg     2.2     05-14          CAPILLARY BLOOD GLUCOSE      POCT Blood Glucose.: 126 mg/dL (15 May 2019 12:51)  POCT Blood Glucose.: 144 mg/dL (15 May 2019 08:03)  POCT Blood Glucose.: 134 mg/dL (14 May 2019 21:38)  POCT Blood Glucose.: 129 mg/dL (14 May 2019 17:00)

## 2019-05-15 NOTE — DISCHARGE NOTE NURSING/CASE MANAGEMENT/SOCIAL WORK - NSDCDPATPORTLINK_GEN_ALL_CORE
You can access the [facility] Patient Portal, offered by [facility], by registering with the following website: [email or web address]

## 2019-05-15 NOTE — PROGRESS NOTE ADULT - REASON FOR ADMISSION
left leg pain and swelling

## 2019-05-17 ENCOUNTER — APPOINTMENT (OUTPATIENT)
Dept: VASCULAR SURGERY | Facility: CLINIC | Age: 39
End: 2019-05-17
Payer: MEDICARE

## 2019-05-17 PROCEDURE — 93990 DOPPLER FLOW TESTING: CPT

## 2019-05-17 PROCEDURE — 99214 OFFICE O/P EST MOD 30 MIN: CPT

## 2019-05-17 NOTE — PHYSICAL EXAM
[Pulsatile Thrill] : pulsatile thrill [Bleeding] : bleeding [Hand well perfused] : hand well perfused [Aneurysm] : aneurysm [Normal] : normoactive bowel sounds, soft and nontender, no hepatosplenomegaly or masses appreciated

## 2019-05-17 NOTE — ASSESSMENT
[FreeTextEntry1] : Patient with renal failure on hemodialysis. Left brachiocephalic fistula with stents in place and moderate stenosis and significant bleeding bleeding and after hemodialysis. Plan for fistulogram and angioplasty.

## 2019-05-18 PROBLEM — R06.2 WHEEZING: Status: ACTIVE | Noted: 2019-05-18

## 2019-05-18 RX ORDER — ALBUTEROL SULFATE 90 UG/1
108 (90 BASE) AEROSOL, METERED RESPIRATORY (INHALATION)
Qty: 1 | Refills: 5 | Status: ACTIVE | COMMUNITY
Start: 2019-05-18 | End: 1900-01-01

## 2019-05-20 ENCOUNTER — INPATIENT (INPATIENT)
Facility: HOSPITAL | Age: 39
LOS: 0 days | Discharge: HOME | DRG: 190 | End: 2019-05-21
Attending: INTERNAL MEDICINE | Admitting: INTERNAL MEDICINE
Payer: COMMERCIAL

## 2019-05-20 VITALS
WEIGHT: 315 LBS | HEIGHT: 78 IN | SYSTOLIC BLOOD PRESSURE: 133 MMHG | RESPIRATION RATE: 18 BRPM | DIASTOLIC BLOOD PRESSURE: 82 MMHG | HEART RATE: 86 BPM | OXYGEN SATURATION: 98 % | TEMPERATURE: 98 F

## 2019-05-20 DIAGNOSIS — F31.9 BIPOLAR DISORDER, UNSPECIFIED: ICD-10-CM

## 2019-05-20 DIAGNOSIS — E11.9 TYPE 2 DIABETES MELLITUS WITHOUT COMPLICATIONS: ICD-10-CM

## 2019-05-20 DIAGNOSIS — J45.901 UNSPECIFIED ASTHMA WITH (ACUTE) EXACERBATION: ICD-10-CM

## 2019-05-20 DIAGNOSIS — Z29.9 ENCOUNTER FOR PROPHYLACTIC MEASURES, UNSPECIFIED: ICD-10-CM

## 2019-05-20 DIAGNOSIS — Z98.890 OTHER SPECIFIED POSTPROCEDURAL STATES: Chronic | ICD-10-CM

## 2019-05-20 DIAGNOSIS — I10 ESSENTIAL (PRIMARY) HYPERTENSION: ICD-10-CM

## 2019-05-20 DIAGNOSIS — N18.6 END STAGE RENAL DISEASE: ICD-10-CM

## 2019-05-20 LAB
AMPHET UR-MCNC: NEGATIVE — SIGNIFICANT CHANGE UP
ANION GAP SERPL CALC-SCNC: 13 MMOL/L — SIGNIFICANT CHANGE UP (ref 5–17)
BARBITURATES UR SCN-MCNC: NEGATIVE — SIGNIFICANT CHANGE UP
BASOPHILS # BLD AUTO: 0.04 K/UL — SIGNIFICANT CHANGE UP (ref 0–0.2)
BASOPHILS NFR BLD AUTO: 0.5 % — SIGNIFICANT CHANGE UP (ref 0–2)
BENZODIAZ UR-MCNC: NEGATIVE — SIGNIFICANT CHANGE UP
BUN SERPL-MCNC: 51 MG/DL — HIGH (ref 7–18)
CALCIUM SERPL-MCNC: 8.2 MG/DL — LOW (ref 8.4–10.5)
CHLORIDE SERPL-SCNC: 98 MMOL/L — SIGNIFICANT CHANGE UP (ref 96–108)
CK MB BLD-MCNC: 0.7 % — SIGNIFICANT CHANGE UP (ref 0–3.5)
CK MB BLD-MCNC: <0.7 % — SIGNIFICANT CHANGE UP (ref 0–3.5)
CK MB CFR SERPL CALC: 1.2 NG/ML — SIGNIFICANT CHANGE UP (ref 0–3.6)
CK MB CFR SERPL CALC: <1 NG/ML — SIGNIFICANT CHANGE UP (ref 0–3.6)
CK SERPL-CCNC: 147 U/L — SIGNIFICANT CHANGE UP (ref 35–232)
CK SERPL-CCNC: 169 U/L — SIGNIFICANT CHANGE UP (ref 35–232)
CO2 SERPL-SCNC: 29 MMOL/L — SIGNIFICANT CHANGE UP (ref 22–31)
COCAINE METAB.OTHER UR-MCNC: POSITIVE
CREAT SERPL-MCNC: 11.3 MG/DL — HIGH (ref 0.5–1.3)
EOSINOPHIL # BLD AUTO: 0.34 K/UL — SIGNIFICANT CHANGE UP (ref 0–0.5)
EOSINOPHIL NFR BLD AUTO: 4 % — SIGNIFICANT CHANGE UP (ref 0–6)
GLUCOSE BLDC GLUCOMTR-MCNC: 180 MG/DL — HIGH (ref 70–99)
GLUCOSE BLDC GLUCOMTR-MCNC: 302 MG/DL — HIGH (ref 70–99)
GLUCOSE BLDC GLUCOMTR-MCNC: 321 MG/DL — HIGH (ref 70–99)
GLUCOSE SERPL-MCNC: 122 MG/DL — HIGH (ref 70–99)
HCT VFR BLD CALC: 27.6 % — LOW (ref 39–50)
HGB BLD-MCNC: 8.6 G/DL — LOW (ref 13–17)
IMM GRANULOCYTES NFR BLD AUTO: 0.4 % — SIGNIFICANT CHANGE UP (ref 0–1.5)
LYMPHOCYTES # BLD AUTO: 1.57 K/UL — SIGNIFICANT CHANGE UP (ref 1–3.3)
LYMPHOCYTES # BLD AUTO: 18.3 % — SIGNIFICANT CHANGE UP (ref 13–44)
MCHC RBC-ENTMCNC: 30.8 PG — SIGNIFICANT CHANGE UP (ref 27–34)
MCHC RBC-ENTMCNC: 31.2 GM/DL — LOW (ref 32–36)
MCV RBC AUTO: 98.9 FL — SIGNIFICANT CHANGE UP (ref 80–100)
METHADONE UR-MCNC: NEGATIVE — SIGNIFICANT CHANGE UP
MONOCYTES # BLD AUTO: 0.73 K/UL — SIGNIFICANT CHANGE UP (ref 0–0.9)
MONOCYTES NFR BLD AUTO: 8.5 % — SIGNIFICANT CHANGE UP (ref 2–14)
NEUTROPHILS # BLD AUTO: 5.85 K/UL — SIGNIFICANT CHANGE UP (ref 1.8–7.4)
NEUTROPHILS NFR BLD AUTO: 68.3 % — SIGNIFICANT CHANGE UP (ref 43–77)
NRBC # BLD: 0 /100 WBCS — SIGNIFICANT CHANGE UP (ref 0–0)
OPIATES UR-MCNC: NEGATIVE — SIGNIFICANT CHANGE UP
PCP SPEC-MCNC: SIGNIFICANT CHANGE UP
PCP UR-MCNC: NEGATIVE — SIGNIFICANT CHANGE UP
PLATELET # BLD AUTO: 277 K/UL — SIGNIFICANT CHANGE UP (ref 150–400)
POTASSIUM SERPL-MCNC: 4.9 MMOL/L — SIGNIFICANT CHANGE UP (ref 3.5–5.3)
POTASSIUM SERPL-SCNC: 4.9 MMOL/L — SIGNIFICANT CHANGE UP (ref 3.5–5.3)
RBC # BLD: 2.79 M/UL — LOW (ref 4.2–5.8)
RBC # FLD: 15.7 % — HIGH (ref 10.3–14.5)
SODIUM SERPL-SCNC: 140 MMOL/L — SIGNIFICANT CHANGE UP (ref 135–145)
THC UR QL: NEGATIVE — SIGNIFICANT CHANGE UP
TROPONIN I SERPL-MCNC: 0.02 NG/ML — SIGNIFICANT CHANGE UP (ref 0–0.04)
TROPONIN I SERPL-MCNC: 0.03 NG/ML — SIGNIFICANT CHANGE UP (ref 0–0.04)
WBC # BLD: 8.56 K/UL — SIGNIFICANT CHANGE UP (ref 3.8–10.5)
WBC # FLD AUTO: 8.56 K/UL — SIGNIFICANT CHANGE UP (ref 3.8–10.5)

## 2019-05-20 PROCEDURE — 99223 1ST HOSP IP/OBS HIGH 75: CPT

## 2019-05-20 PROCEDURE — 71045 X-RAY EXAM CHEST 1 VIEW: CPT | Mod: 26

## 2019-05-20 PROCEDURE — 99285 EMERGENCY DEPT VISIT HI MDM: CPT | Mod: 25

## 2019-05-20 PROCEDURE — 93010 ELECTROCARDIOGRAM REPORT: CPT

## 2019-05-20 RX ORDER — SIMVASTATIN 20 MG/1
40 TABLET, FILM COATED ORAL AT BEDTIME
Refills: 0 | Status: DISCONTINUED | OUTPATIENT
Start: 2019-05-20 | End: 2019-05-21

## 2019-05-20 RX ORDER — CHLORHEXIDINE GLUCONATE 213 G/1000ML
1 SOLUTION TOPICAL
Refills: 0 | Status: DISCONTINUED | OUTPATIENT
Start: 2019-05-20 | End: 2019-05-21

## 2019-05-20 RX ORDER — IPRATROPIUM/ALBUTEROL SULFATE 18-103MCG
3 AEROSOL WITH ADAPTER (GRAM) INHALATION ONCE
Refills: 0 | Status: COMPLETED | OUTPATIENT
Start: 2019-05-20 | End: 2019-05-20

## 2019-05-20 RX ORDER — SOFT LENS DISINFECTANT
SOLUTION, NON-ORAL MISCELLANEOUS
Qty: 1 | Refills: 0 | Status: ACTIVE | COMMUNITY
Start: 2019-05-20 | End: 1900-01-01

## 2019-05-20 RX ORDER — INSULIN LISPRO 100/ML
VIAL (ML) SUBCUTANEOUS
Refills: 0 | Status: DISCONTINUED | OUTPATIENT
Start: 2019-05-20 | End: 2019-05-21

## 2019-05-20 RX ORDER — IPRATROPIUM/ALBUTEROL SULFATE 18-103MCG
3 AEROSOL WITH ADAPTER (GRAM) INHALATION EVERY 6 HOURS
Refills: 0 | Status: DISCONTINUED | OUTPATIENT
Start: 2019-05-20 | End: 2019-05-21

## 2019-05-20 RX ORDER — ALBUTEROL 90 UG/1
2.5 AEROSOL, METERED ORAL ONCE
Refills: 0 | Status: COMPLETED | OUTPATIENT
Start: 2019-05-20 | End: 2019-05-20

## 2019-05-20 RX ORDER — CLOPIDOGREL BISULFATE 75 MG/1
75 TABLET, FILM COATED ORAL DAILY
Refills: 0 | Status: DISCONTINUED | OUTPATIENT
Start: 2019-05-20 | End: 2019-05-21

## 2019-05-20 RX ORDER — QUETIAPINE FUMARATE 200 MG/1
200 TABLET, FILM COATED ORAL DAILY
Refills: 0 | Status: DISCONTINUED | OUTPATIENT
Start: 2019-05-20 | End: 2019-05-21

## 2019-05-20 RX ORDER — SODIUM CHLORIDE 9 MG/ML
3 INJECTION INTRAMUSCULAR; INTRAVENOUS; SUBCUTANEOUS ONCE
Refills: 0 | Status: COMPLETED | OUTPATIENT
Start: 2019-05-20 | End: 2019-05-20

## 2019-05-20 RX ORDER — ALBUTEROL SULFATE 2.5 MG/3ML
(2.5 MG/3ML) SOLUTION RESPIRATORY (INHALATION)
Qty: 1 | Refills: 3 | Status: ACTIVE | COMMUNITY
Start: 2019-05-20 | End: 1900-01-01

## 2019-05-20 RX ORDER — MAGNESIUM SULFATE 500 MG/ML
2 VIAL (ML) INJECTION ONCE
Refills: 0 | Status: COMPLETED | OUTPATIENT
Start: 2019-05-20 | End: 2019-05-20

## 2019-05-20 RX ORDER — METHOCARBAMOL 500 MG/1
1500 TABLET, FILM COATED ORAL ONCE
Refills: 0 | Status: COMPLETED | OUTPATIENT
Start: 2019-05-20 | End: 2019-05-20

## 2019-05-20 RX ADMIN — SODIUM CHLORIDE 3 MILLILITER(S): 9 INJECTION INTRAMUSCULAR; INTRAVENOUS; SUBCUTANEOUS at 04:38

## 2019-05-20 RX ADMIN — Medication 1: at 21:54

## 2019-05-20 RX ADMIN — METHOCARBAMOL 1500 MILLIGRAM(S): 500 TABLET, FILM COATED ORAL at 22:34

## 2019-05-20 RX ADMIN — Medication 50 MILLIGRAM(S): at 03:39

## 2019-05-20 RX ADMIN — Medication 4: at 13:18

## 2019-05-20 RX ADMIN — Medication 125 MILLIGRAM(S): at 04:38

## 2019-05-20 RX ADMIN — Medication 40 MILLIGRAM(S): at 21:53

## 2019-05-20 RX ADMIN — SIMVASTATIN 40 MILLIGRAM(S): 20 TABLET, FILM COATED ORAL at 22:34

## 2019-05-20 RX ADMIN — Medication 3 MILLILITER(S): at 03:30

## 2019-05-20 RX ADMIN — Medication 3 MILLILITER(S): at 12:10

## 2019-05-20 RX ADMIN — Medication 3 MILLILITER(S): at 21:54

## 2019-05-20 RX ADMIN — Medication 2 GRAM(S): at 05:52

## 2019-05-20 RX ADMIN — CLOPIDOGREL BISULFATE 75 MILLIGRAM(S): 75 TABLET, FILM COATED ORAL at 12:10

## 2019-05-20 RX ADMIN — QUETIAPINE FUMARATE 200 MILLIGRAM(S): 200 TABLET, FILM COATED ORAL at 12:10

## 2019-05-20 RX ADMIN — Medication 50 GRAM(S): at 04:38

## 2019-05-20 RX ADMIN — Medication 3 MILLILITER(S): at 03:50

## 2019-05-20 RX ADMIN — ALBUTEROL 2.5 MILLIGRAM(S): 90 AEROSOL, METERED ORAL at 08:04

## 2019-05-20 NOTE — H&P ADULT - ATTENDING COMMENTS
patient was seen and examined along with resident   above discussed, reviewed and agreed   plus renal and pulmonary eval

## 2019-05-20 NOTE — H&P ADULT - PROBLEM SELECTOR PLAN 1
Likely 2/2 cigarette use; counselling at bedside, advised cessation, currently refused aid at this time.  - Afebrile, no WBC elevation, CXR unchanged, and ROS unremarkable  - Reportedly first asthma exacerbation since childhood and recently prescribed PRN Albuterol w/out improvement x 3 days  - Peak Flow 300 sp Duoneb x 2, 125 IV solumedrol + 50 PO and no wheezing on examination  - C/w Duoneb and Solumedrol 40 BID; no indication for ABx as this time  Pulmonary Dr Thurston consulted  ***Monitor peak flow and dyspnea; needs outpatient PFTs Likely 2/2 cigarette use; counselling at bedside, advised cessation, currently refused aid at this time.  - Afebrile, no WBC elevation, CXR unchanged, EKG NSR, prior TTE March WNLs, and ROS unremarkable  - Reportedly first asthma exacerbation since childhood and recently prescribed PRN Albuterol w/out improvement x 3 days  - Peak Flow 300 sp Duoneb x 2, 125 IV solumedrol + 50 PO and no wheezing on examination  - C/w Duoneb and Solumedrol 40 BID; no indication for ABx as this time  Pulmonary Dr Thurston consulted  ***Monitor peak flow and dyspnea; needs outpatient PFTs

## 2019-05-20 NOTE — H&P ADULT - NSHPSOCIALHISTORY_GEN_ALL_CORE
Smokes 7 cigarettes a day; past PPD x 15 years old; recreational Marijuana, Cocaine, and Alcohol use. Lives with family, employed in security

## 2019-05-20 NOTE — CONSULT NOTE ADULT - ASSESSMENT
# ESRD: HD today  # hypervolemia: UF on HD. compliance with salt and fluid restriction re-inforced.  # CKDMBD resume binders  # anemia of CKD. Procrit on HD   # Asthma exercebations. Per primary team
1) Acute on chronic dyspnea- ?Asthma exacerbation vs cocaine induced bronchospasm. No clear diagnosis of asthma. Recent complete PFTs showed restrictive lung disease likely from obesity. Admitted to resident of cocaine use.  2) Morbid obesity  3) ESRD on HD  4) Anemia secondary to ESRD    Dyspnea likely multifactorial, unclear if actual airway disease or from obesity and illicit drug and tobacco use with chronic anemia  Currently no wheezing or accessory muscle use  Can consider converting IV to oral steroids and bronchodilators  Tox screen to eval for recent cocaine use though pt admits to prior use  Pt scheduled for outpatient repeat sleep study  Will follow

## 2019-05-20 NOTE — ED PROVIDER NOTE - OBJECTIVE STATEMENT
38 y/o M w/ PMHx of chronic leg cellulitis, ESRD, DM, HTN, bipolar disorder, schizophrenia, presents to ED c/o shortness of breath and wheezing x 1500 yesterday, w/ minimal relief after Ventolin. Pt w/ no reported fever or vomiting.

## 2019-05-20 NOTE — ED PROVIDER NOTE - PHYSICAL EXAMINATION
RESPIRATORY:  (+) b/l diffuse wheezing, no retractions RESPIRATORY:  (+) b/l diffuse wheezing, no retractions    left arm AV fistula, + bruits and thrills

## 2019-05-20 NOTE — H&P ADULT - HISTORY OF PRESENT ILLNESS
40 y/o morbidly obese M PMH Type 2 DM, ESRD on HD (left arm graft, MWF), HTN, Asthma, Schizophrenia, Bacteremia due to Foot cellulitis presented to ED c/o worsening dyspnea w/ productive cough x 1 day - began 3 PM the day prior and worsened w/ lying supine, went to sleep w/out improvement and reportedly witnessed by father to have increased WOB while asleep - symptoms didn't improve and EMS called - report noted for c/o SOB w/ CP and bilateral diffuse wheezing. Patient recently received PRN Albuterol and has been using daily. Patient is an active smoker - 7 cigarettes a day. Otherwise denied any F, chills, abdominal pain, acute change to LE edema, or any other complaints. Patient recently discharged w/in month for LLE cellulitis and treated w/ ABx. Patient denied any compliance to medications 2/2 ineffective; does not take Lantus 2/2 reportedly normoglycemic; otherwise compliant w/ HD.

## 2019-05-20 NOTE — H&P ADULT - ASSESSMENT
38 y/o morbidly obese M PMH Type 2 DM, ESRD on HD (left arm graft, MWF), HTN, Asthma, Schizophrenia, Bacteremia due to Foot cellulitis presented to ED c/o worsening dyspnea w/ productive cough x 1 day - admitting for asthma exacerbation likely 2/2 continued smoking

## 2019-05-20 NOTE — H&P ADULT - PROBLEM SELECTOR PLAN 6
IMPROVE VTE Individual Risk Assessment    RISK                                                          Points  [] Previous VTE                                           3  [] Thrombophilia                                        2  [] Lower limb paralysis                              2   [] Current Cancer                                       2   [] Immobilization > 24 hrs                        1  [] ICU/CCU stay > 24 hours                       1  [] Age > 60                                                   1    IMPROVE VTE Score: 0, no indications for DVT PPx

## 2019-05-20 NOTE — CONSULT NOTE ADULT - SUBJECTIVE AND OBJECTIVE BOX
Source: Patient, Chart. Known to me from my clinic. I just saw the patient 2 weeks prior    Reason for Consultation: SOB    Chief Complaint: SOB x 1 day    HPI:  38 y/o morbidly obese M PMH Type 2 DM, ESRD on HD (left arm graft, MWF), HTN, Schizophrenia, Bacteremia due to Foot cellulitis presented to ED c/o worsening dyspnea w/ productive cough x 1 day - began 3 PM the day prior and worsened w/ lying supine, went to sleep w/out improvement and reportedly witnessed by father to have increased WOB while asleep - symptoms didn't improve and EMS called - report noted for c/o SOB w/ CP and bilateral diffuse wheezing. Patient recently received PRN Albuterol and has been using daily. Patient is an active smoker - 7 cigarettes a day. Otherwise denied any F, chills, abdominal pain, acute change to LE edema, or any other complaints. Patient recently discharged w/in month for LLE cellulitis and treated w/ ABx. Patient denied any compliance to medications 2/2 ineffective; does not take Lantus 2/2 reportedly normoglycemic; otherwise compliant w/ HD. (20 May 2019 08:07)    The patient is known to me from prior admissions and my pulmonary clinic. He has multiple frequent admissions for chronic dyspnea and leg swelling. He admitted to the resident of recent cocaine and smoking use though he denied it to me. Recent PFTs showed restriction and low dlco likely from morbid obesity and anemia from ESRD. He currently feels better.        MEDICATIONS  (STANDING):  ALBUTerol/ipratropium for Nebulization 3 milliLiter(s) Nebulizer every 6 hours  chlorhexidine 4% Liquid 1 Application(s) Topical <User Schedule>  clopidogrel Tablet 75 milliGRAM(s) Oral daily  insulin lispro (HumaLOG) corrective regimen sliding scale   SubCutaneous Before meals and at bedtime  methylPREDNISolone sodium succinate Injectable 40 milliGRAM(s) IV Push every 12 hours  QUEtiapine 200 milliGRAM(s) Oral daily  simvastatin 40 milliGRAM(s) Oral at bedtime    MEDICATIONS  (PRN):    Allergies    aspirin (Short breath)  aspirin (Swelling)  penicillin (Short breath)  penicillins (Swelling)  shellfish (Unknown)    Intolerances      PAST MEDICAL & SURGICAL HISTORY:  Migraine  HTN (hypertension)  DM (diabetes mellitus)  Bipolar disorder  Schizophrenia  ESRD on dialysis  Morbid obesity with BMI of 40.0-44.9, adult  H/O hernia repair      FAMILY HISTORY:  Family history of COPD (chronic obstructive pulmonary disease)  Family history of diabetes mellitus      SOCIAL HISTORY  Smoking History: +Smoker  Alcohol: Social  Drugs: Admits to cocaine and marijuana use  Occupation: Unemployed    T(C): 36.9 (05-20-19 @ 07:04), Max: 36.9 (05-20-19 @ 07:04)  HR: 90 (05-20-19 @ 07:04) (86 - 90)  BP: 157/90 (05-20-19 @ 07:04) (133/82 - 157/90)  RR: 18 (05-20-19 @ 07:04) (18 - 18)  SpO2: 98% (05-20-19 @ 07:04) (98% - 98%)    LABS:                        8.6    8.56  )-----------( 277      ( 20 May 2019 04:31 )             27.6     05-20    140  |  98  |  51<H>  ----------------------------<  122<H>  4.9   |  29  |  11.30<H>    Ca    8.2<L>      20 May 2019 04:31                            Microbiology      RADIOLOGY & ADDITIONAL STUDIES: (My Reading) Source: Patient, Chart. Known to me from my clinic. I just saw the patient 2 weeks prior    Reason for Consultation: SOB    Chief Complaint: SOB x 1 day    HPI:  40 y/o morbidly obese M PMH Type 2 DM, ESRD on HD (left arm graft, MWF), HTN, Schizophrenia, Bacteremia due to Foot cellulitis presented to ED c/o worsening dyspnea w/ productive cough x 1 day - began 3 PM the day prior and worsened w/ lying supine, went to sleep w/out improvement and reportedly witnessed by father to have increased WOB while asleep - symptoms didn't improve and EMS called - report noted for c/o SOB w/ CP and bilateral diffuse wheezing. Patient recently received PRN Albuterol and has been using daily. Patient is an active smoker - 7 cigarettes a day. Otherwise denied any F, chills, abdominal pain, acute change to LE edema, or any other complaints. Patient recently discharged w/in month for LLE cellulitis and treated w/ ABx. Patient denied any compliance to medications 2/2 ineffective; does not take Lantus 2/2 reportedly normoglycemic; otherwise compliant w/ HD. (20 May 2019 08:07)    The patient is known to me from prior admissions and my pulmonary clinic. He has multiple frequent admissions for chronic dyspnea and leg swelling. He admitted to the resident of recent cocaine and smoking use though he denied it to me. Recent PFTs showed restriction and low dlco likely from morbid obesity and anemia from ESRD. He currently feels better.        MEDICATIONS  (STANDING):  ALBUTerol/ipratropium for Nebulization 3 milliLiter(s) Nebulizer every 6 hours  chlorhexidine 4% Liquid 1 Application(s) Topical <User Schedule>  clopidogrel Tablet 75 milliGRAM(s) Oral daily  insulin lispro (HumaLOG) corrective regimen sliding scale   SubCutaneous Before meals and at bedtime  methylPREDNISolone sodium succinate Injectable 40 milliGRAM(s) IV Push every 12 hours  QUEtiapine 200 milliGRAM(s) Oral daily  simvastatin 40 milliGRAM(s) Oral at bedtime    MEDICATIONS  (PRN):    Allergies    aspirin (Short breath)  aspirin (Swelling)  penicillin (Short breath)  penicillins (Swelling)  shellfish (Unknown)    Intolerances      PAST MEDICAL & SURGICAL HISTORY:  Migraine  HTN (hypertension)  DM (diabetes mellitus)  Bipolar disorder  Schizophrenia  ESRD on dialysis  Morbid obesity with BMI of 40.0-44.9, adult  H/O hernia repair      FAMILY HISTORY:  Family history of COPD (chronic obstructive pulmonary disease)  Family history of diabetes mellitus      SOCIAL HISTORY  Smoking History: +Smoker  Alcohol: Social  Drugs: Admits to cocaine and marijuana use  Occupation: Unemployed    T(C): 36.9 (05-20-19 @ 07:04), Max: 36.9 (05-20-19 @ 07:04)  HR: 90 (05-20-19 @ 07:04) (86 - 90)  BP: 157/90 (05-20-19 @ 07:04) (133/82 - 157/90)  RR: 18 (05-20-19 @ 07:04) (18 - 18)  SpO2: 98% (05-20-19 @ 07:04) (98% - 98%)    LABS:                        8.6    8.56  )-----------( 277      ( 20 May 2019 04:31 )             27.6     05-20    140  |  98  |  51<H>  ----------------------------<  122<H>  4.9   |  29  |  11.30<H>    Ca    8.2<L>      20 May 2019 04:31    Microbiology    RADIOLOGY & ADDITIONAL STUDIES: (My Reading)  CXR- No infiltrates

## 2019-05-20 NOTE — ED ADULT NURSE NOTE - NSIMPLEMENTINTERV_GEN_ALL_ED
Implemented All Universal Safety Interventions:  [place] to call system. Call bell, personal items and telephone within reach. Instruct patient to call for assistance. Room bathroom lighting operational. Non-slip footwear when patient is off stretcher. Physically safe environment: no spills, clutter or unnecessary equipment. Stretcher in lowest position, wheels locked, appropriate side rails in place.

## 2019-05-20 NOTE — ED ADULT NURSE NOTE - ED STAT RN HANDOFF DETAILS
Patient received from DANIEL Gibson admitted to medicine in no acute distress endorsed to DANIEL Feliciano stable in no acute distress.

## 2019-05-20 NOTE — H&P ADULT - PROBLEM SELECTOR PLAN 2
Reportedly compliant w/ L AVF; + thrill; + UO  - BMP WNLs, CXR grossly unchanged, no emergent indication for HD  Nephrology Dr La  ***Plan for HD today: 5/20

## 2019-05-20 NOTE — CONSULT NOTE ADULT - SUBJECTIVE AND OBJECTIVE BOX
[facility] : Progress Note :: [phone], (office [phone]),   Dr La / Dr Hui / Dr Barber / Dr Villalobos / Dr Hang CASTELLANO / Dr Mehta / Dr Sanchez / Dr Alber dumont  _____________________________________________________________________________________________  Patient is a 39y Male with ESRD on HD MWF. Multiple admissions in this hospital recently. Presented to ED with C/O SOB, wheezing. managed with nebulizers and solumedrol. renal consulted as due for dialysis. At evaluation, complained of cramping to the RT hand. SOB has improved.    PAST MEDICAL & SURGICAL HISTORY:  Migraine  HTN (hypertension)  DM (diabetes mellitus)  Bipolar disorder  Schizophrenia  ESRD on dialysis  Morbid obesity with BMI of 40.0-44.9, adult  H/O hernia repair    aspirin (Short breath)  aspirin (Swelling)  penicillin (Short breath)  penicillins (Swelling)  shellfish (Unknown)    Home Medications Reviewed  Hospital Medications:   MEDICATIONS  (STANDING):  ALBUTerol/ipratropium for Nebulization 3 milliLiter(s) Nebulizer every 6 hours  chlorhexidine 4% Liquid 1 Application(s) Topical <User Schedule>  clopidogrel Tablet 75 milliGRAM(s) Oral daily  insulin lispro (HumaLOG) corrective regimen sliding scale   SubCutaneous Before meals and at bedtime  methocarbamol 1500 milliGRAM(s) Oral once  methylPREDNISolone sodium succinate Injectable 40 milliGRAM(s) IV Push every 12 hours  QUEtiapine 200 milliGRAM(s) Oral daily  simvastatin 40 milliGRAM(s) Oral at bedtime    SOCIAL HISTORY:  Denies ETOh,Smoking,   FAMILY HISTORY:  Family history of COPD (chronic obstructive pulmonary disease)  Family history of diabetes mellitus      VITALS:  T(F): 98.7 (05-20-19 @ 16:04), Max: 98.7 (05-20-19 @ 16:04)  HR: 96 (05-20-19 @ 16:04)  BP: 168/97 (05-20-19 @ 16:04)  RR: 18 (05-20-19 @ 16:04)  SpO2: 99% (05-20-19 @ 16:04)  Wt(kg): --    Height (cm): 200.66 (05-20 @ 02:43)  Weight (kg): 167.8 (05-20 @ 02:43)  BMI (kg/m2): 41.7 (05-20 @ 02:43)  BSA (m2): 2.96 (05-20 @ 02:43)    PHYSICAL EXAM:  Constitutional: NAD  HEENT: anicteric sclera, oropharynx clear.  Neck: No JVD  Respiratory: CTAB, no wheezes, rales or rhonchi  Cardiovascular: S1, S2, RRR  Gastrointestinal: BS+, soft, NT/ND  Extremities: peripheral edema++  Neurological: A/O x 3, no focal deficits    Vascular Access: LUEAVF with thrill and bruit    LABS:  05-20    140  |  98  |  51<H>  ----------------------------<  122<H>  4.9   |  29  |  11.30<H>    Ca    8.2<L>      20 May 2019 04:31      Creatinine Trend: 11.30 <--, 11.40 <--, 9.28 <--                        8.6    8.56  )-----------( 277      ( 20 May 2019 04:31 )             27.6     Urine Studies:      RADIOLOGY & ADDITIONAL STUDIES:

## 2019-05-20 NOTE — H&P ADULT - NEUROLOGICAL DETAILS
responds to verbal commands/sensation intact/responds to pain/alert and oriented x 3/normal strength

## 2019-05-20 NOTE — H&P ADULT - NSHPPHYSICALEXAM_GEN_ALL_CORE
T(C): 36.9 (20 May 2019 07:04), Max: 36.9 (20 May 2019 07:04)  T(F): 98.4 (20 May 2019 07:04), Max: 98.4 (20 May 2019 07:04)  HR: 90 (20 May 2019 07:04) (86 - 90)  BP: 157/90 (20 May 2019 07:04) (133/82 - 157/90)  RR: 18 (20 May 2019 07:04) (18 - 18)  SpO2: 98% (20 May 2019 07:04) (98% - 98%)

## 2019-05-20 NOTE — ED PROVIDER NOTE - CLINICAL SUMMARY MEDICAL DECISION MAKING FREE TEXT BOX
6:28a- Wheezing lessen but still present, pt states feels less short of breath, + exertional dyspnea. Will admit for asthma exacerbation. Dr. Santillan (cover Dr. Andino) endorsed. Pt agrees with admission. Pt is due for HD today, Dr. Sebastian (nephro) paged.  I had a detailed discussion with the patient and/or guardian regarding the historical points, exam findings, and any diagnostic results supporting the admit diagnosis.

## 2019-05-21 ENCOUNTER — TRANSCRIPTION ENCOUNTER (OUTPATIENT)
Age: 39
End: 2019-05-21

## 2019-05-21 VITALS
RESPIRATION RATE: 21 BRPM | HEART RATE: 93 BPM | DIASTOLIC BLOOD PRESSURE: 104 MMHG | OXYGEN SATURATION: 99 % | SYSTOLIC BLOOD PRESSURE: 166 MMHG | TEMPERATURE: 98 F

## 2019-05-21 DIAGNOSIS — Z72.0 TOBACCO USE: ICD-10-CM

## 2019-05-21 DIAGNOSIS — E66.01 MORBID (SEVERE) OBESITY DUE TO EXCESS CALORIES: ICD-10-CM

## 2019-05-21 LAB
ANION GAP SERPL CALC-SCNC: 10 MMOL/L — SIGNIFICANT CHANGE UP (ref 5–17)
ANION GAP SERPL CALC-SCNC: 11 MMOL/L — SIGNIFICANT CHANGE UP (ref 5–17)
ANION GAP SERPL CALC-SCNC: 13 MMOL/L — SIGNIFICANT CHANGE UP (ref 5–17)
BASOPHILS # BLD AUTO: 0.01 K/UL — SIGNIFICANT CHANGE UP (ref 0–0.2)
BASOPHILS NFR BLD AUTO: 0.1 % — SIGNIFICANT CHANGE UP (ref 0–2)
BUN SERPL-MCNC: 49 MG/DL — HIGH (ref 7–18)
BUN SERPL-MCNC: 54 MG/DL — HIGH (ref 7–18)
BUN SERPL-MCNC: 57 MG/DL — HIGH (ref 7–18)
CALCIUM SERPL-MCNC: 8 MG/DL — LOW (ref 8.4–10.5)
CALCIUM SERPL-MCNC: 8.3 MG/DL — LOW (ref 8.4–10.5)
CALCIUM SERPL-MCNC: 8.6 MG/DL — SIGNIFICANT CHANGE UP (ref 8.4–10.5)
CHLORIDE SERPL-SCNC: 95 MMOL/L — LOW (ref 96–108)
CHLORIDE SERPL-SCNC: 96 MMOL/L — SIGNIFICANT CHANGE UP (ref 96–108)
CHLORIDE SERPL-SCNC: 98 MMOL/L — SIGNIFICANT CHANGE UP (ref 96–108)
CO2 SERPL-SCNC: 27 MMOL/L — SIGNIFICANT CHANGE UP (ref 22–31)
CREAT SERPL-MCNC: 9.37 MG/DL — HIGH (ref 0.5–1.3)
CREAT SERPL-MCNC: 9.4 MG/DL — HIGH (ref 0.5–1.3)
CREAT SERPL-MCNC: 9.72 MG/DL — HIGH (ref 0.5–1.3)
EOSINOPHIL # BLD AUTO: 0 K/UL — SIGNIFICANT CHANGE UP (ref 0–0.5)
EOSINOPHIL NFR BLD AUTO: 0 % — SIGNIFICANT CHANGE UP (ref 0–6)
GLUCOSE BLDC GLUCOMTR-MCNC: 201 MG/DL — HIGH (ref 70–99)
GLUCOSE BLDC GLUCOMTR-MCNC: 292 MG/DL — HIGH (ref 70–99)
GLUCOSE BLDC GLUCOMTR-MCNC: 337 MG/DL — HIGH (ref 70–99)
GLUCOSE SERPL-MCNC: 225 MG/DL — HIGH (ref 70–99)
GLUCOSE SERPL-MCNC: 267 MG/DL — HIGH (ref 70–99)
GLUCOSE SERPL-MCNC: 314 MG/DL — HIGH (ref 70–99)
HCT VFR BLD CALC: 30.2 % — LOW (ref 39–50)
HGB BLD-MCNC: 9.2 G/DL — LOW (ref 13–17)
IGE SERPL-ACNC: 41 IU/ML — SIGNIFICANT CHANGE UP
IMM GRANULOCYTES NFR BLD AUTO: 0.6 % — SIGNIFICANT CHANGE UP (ref 0–1.5)
LYMPHOCYTES # BLD AUTO: 0.63 K/UL — LOW (ref 1–3.3)
LYMPHOCYTES # BLD AUTO: 6 % — LOW (ref 13–44)
MAGNESIUM SERPL-MCNC: 2.5 MG/DL — SIGNIFICANT CHANGE UP (ref 1.6–2.6)
MCHC RBC-ENTMCNC: 30 PG — SIGNIFICANT CHANGE UP (ref 27–34)
MCHC RBC-ENTMCNC: 30.5 GM/DL — LOW (ref 32–36)
MCV RBC AUTO: 98.4 FL — SIGNIFICANT CHANGE UP (ref 80–100)
MONOCYTES # BLD AUTO: 0.3 K/UL — SIGNIFICANT CHANGE UP (ref 0–0.9)
MONOCYTES NFR BLD AUTO: 2.9 % — SIGNIFICANT CHANGE UP (ref 2–14)
NEUTROPHILS # BLD AUTO: 9.5 K/UL — HIGH (ref 1.8–7.4)
NEUTROPHILS NFR BLD AUTO: 90.4 % — HIGH (ref 43–77)
NRBC # BLD: 0 /100 WBCS — SIGNIFICANT CHANGE UP (ref 0–0)
PHOSPHATE SERPL-MCNC: 5.6 MG/DL — HIGH (ref 2.5–4.5)
PLATELET # BLD AUTO: 305 K/UL — SIGNIFICANT CHANGE UP (ref 150–400)
POTASSIUM SERPL-MCNC: 5.5 MMOL/L — HIGH (ref 3.5–5.3)
POTASSIUM SERPL-MCNC: 5.8 MMOL/L — HIGH (ref 3.5–5.3)
POTASSIUM SERPL-MCNC: 6.5 MMOL/L — CRITICAL HIGH (ref 3.5–5.3)
POTASSIUM SERPL-SCNC: 5.5 MMOL/L — HIGH (ref 3.5–5.3)
POTASSIUM SERPL-SCNC: 5.8 MMOL/L — HIGH (ref 3.5–5.3)
POTASSIUM SERPL-SCNC: 6.5 MMOL/L — CRITICAL HIGH (ref 3.5–5.3)
RBC # BLD: 3.07 M/UL — LOW (ref 4.2–5.8)
RBC # FLD: 15.6 % — HIGH (ref 10.3–14.5)
SODIUM SERPL-SCNC: 134 MMOL/L — LOW (ref 135–145)
SODIUM SERPL-SCNC: 135 MMOL/L — SIGNIFICANT CHANGE UP (ref 135–145)
SODIUM SERPL-SCNC: 135 MMOL/L — SIGNIFICANT CHANGE UP (ref 135–145)
WBC # BLD: 10.5 K/UL — SIGNIFICANT CHANGE UP (ref 3.8–10.5)
WBC # FLD AUTO: 10.5 K/UL — SIGNIFICANT CHANGE UP (ref 3.8–10.5)

## 2019-05-21 PROCEDURE — 85027 COMPLETE CBC AUTOMATED: CPT

## 2019-05-21 PROCEDURE — 96365 THER/PROPH/DIAG IV INF INIT: CPT

## 2019-05-21 PROCEDURE — 80048 BASIC METABOLIC PNL TOTAL CA: CPT

## 2019-05-21 PROCEDURE — 96374 THER/PROPH/DIAG INJ IV PUSH: CPT

## 2019-05-21 PROCEDURE — 82962 GLUCOSE BLOOD TEST: CPT

## 2019-05-21 PROCEDURE — 82785 ASSAY OF IGE: CPT

## 2019-05-21 PROCEDURE — 36415 COLL VENOUS BLD VENIPUNCTURE: CPT

## 2019-05-21 PROCEDURE — 96375 TX/PRO/DX INJ NEW DRUG ADDON: CPT

## 2019-05-21 PROCEDURE — 80307 DRUG TEST PRSMV CHEM ANLYZR: CPT

## 2019-05-21 PROCEDURE — 83735 ASSAY OF MAGNESIUM: CPT

## 2019-05-21 PROCEDURE — 71045 X-RAY EXAM CHEST 1 VIEW: CPT

## 2019-05-21 PROCEDURE — 93005 ELECTROCARDIOGRAM TRACING: CPT

## 2019-05-21 PROCEDURE — 82550 ASSAY OF CK (CPK): CPT

## 2019-05-21 PROCEDURE — 84484 ASSAY OF TROPONIN QUANT: CPT

## 2019-05-21 PROCEDURE — 84100 ASSAY OF PHOSPHORUS: CPT

## 2019-05-21 PROCEDURE — 94640 AIRWAY INHALATION TREATMENT: CPT

## 2019-05-21 PROCEDURE — G0257: CPT

## 2019-05-21 PROCEDURE — 86003 ALLG SPEC IGE CRUDE XTRC EA: CPT

## 2019-05-21 PROCEDURE — 99285 EMERGENCY DEPT VISIT HI MDM: CPT | Mod: 25

## 2019-05-21 PROCEDURE — 82553 CREATINE MB FRACTION: CPT

## 2019-05-21 PROCEDURE — 99261: CPT

## 2019-05-21 RX ORDER — FERROUS SULFATE 325(65) MG
1 TABLET ORAL
Qty: 60 | Refills: 0
Start: 2019-05-21 | End: 2019-06-19

## 2019-05-21 RX ORDER — SODIUM POLYSTYRENE SULFONATE 4.1 MEQ/G
30 POWDER, FOR SUSPENSION ORAL ONCE
Refills: 0 | Status: COMPLETED | OUTPATIENT
Start: 2019-05-21 | End: 2019-05-21

## 2019-05-21 RX ADMIN — Medication 40 MILLIGRAM(S): at 05:59

## 2019-05-21 RX ADMIN — Medication 3 MILLILITER(S): at 04:07

## 2019-05-21 RX ADMIN — CLOPIDOGREL BISULFATE 75 MILLIGRAM(S): 75 TABLET, FILM COATED ORAL at 12:09

## 2019-05-21 RX ADMIN — QUETIAPINE FUMARATE 200 MILLIGRAM(S): 200 TABLET, FILM COATED ORAL at 12:09

## 2019-05-21 RX ADMIN — Medication 4: at 12:09

## 2019-05-21 RX ADMIN — Medication 2: at 08:41

## 2019-05-21 RX ADMIN — Medication 3 MILLILITER(S): at 08:42

## 2019-05-21 RX ADMIN — Medication 3 MILLILITER(S): at 14:13

## 2019-05-21 RX ADMIN — Medication 600 MILLIGRAM(S): at 00:11

## 2019-05-21 RX ADMIN — SODIUM POLYSTYRENE SULFONATE 30 GRAM(S): 4.1 POWDER, FOR SUSPENSION ORAL at 10:03

## 2019-05-21 RX ADMIN — Medication 3: at 16:33

## 2019-05-21 RX ADMIN — SODIUM POLYSTYRENE SULFONATE 30 GRAM(S): 4.1 POWDER, FOR SUSPENSION ORAL at 14:58

## 2019-05-21 RX ADMIN — Medication 3 MILLILITER(S): at 00:11

## 2019-05-21 RX ADMIN — CHLORHEXIDINE GLUCONATE 1 APPLICATION(S): 213 SOLUTION TOPICAL at 12:02

## 2019-05-21 RX ADMIN — Medication 600 MILLIGRAM(S): at 12:09

## 2019-05-21 NOTE — PROGRESS NOTE ADULT - ASSESSMENT
# ESRD: s/p HD yesterday.  # hyperkalemia. s/p high efficiency HD yesterday yesterday. compliance with renal diet re-inforced.   s/p kayexalate albuterol and insulin with improvement of hyperkalemia.  # hypervolemia: UF on HD. compliance with salt and fluid restriction re-inforced.  # CKDMBD resume binders  # anemia of CKD. Procrit on HD   # Asthma exercebation . Per primary team
38 y/o morbidly obese M PMH Type 2 DM, ESRD on HD (left arm graft, MWF), HTN, Asthma, Schizophrenia, Bacteremia due to Foot cellulitis presented to ED c/o worsening dyspnea w/ productive cough x 1 day - admitting for asthma exacerbation likely 2/2 continued smoking. Seen and examined pt at bedside, AO x 3, stable, no SOB when he sits up. No cough or wheezing on exam.
seen and examined  was brought in sec to sob  h/o asthma but smoker  / copd  no cp or palp  was started on iv solumedrol  as per pt usually twica  year he comes to hosp for asthma/ copd excrabration.  pt states feeling little better,   lungs a/e fair  somr b/l mild whheze.  seen by  pulm , can be changed to po prednisone  urine again cocain   oou bed in chair  has neb macchine pain,  also need cpap/ bpap   has to f/u pulm

## 2019-05-21 NOTE — PROGRESS NOTE ADULT - PROBLEM SELECTOR PLAN 5
non- compliance with meds/ diet (Does not take Lantus 8 U prescribed )  - C/w ISS and diabetic diet;   - f/u HgA1C (prior A1c 5.7)

## 2019-05-21 NOTE — PROGRESS NOTE ADULT - PROBLEM SELECTOR PLAN 2
Likely 2/2 cigarette use; counselling at bedside, advised cessation, currently refused aid at this time.  - C/w Duoneb and Solumedrol 40 BID  Pulmonary Dr Thurston consulted - will switch to P.O   sleep test - 5.29.2019  PFT as outpt

## 2019-05-21 NOTE — DISCHARGE NOTE PROVIDER - NSDCCPCAREPLAN_GEN_ALL_CORE_FT
PRINCIPAL DISCHARGE DIAGNOSIS  Diagnosis: Asthma exacerbation  Assessment and Plan of Treatment: continue with prednisone as prescribed  c/w nebulizer treatment  f/u with PMD      SECONDARY DISCHARGE DIAGNOSES  Diagnosis: DM (diabetes mellitus)  Assessment and Plan of Treatment: continue meds as prescribed  f/u endo/ PMD    Diagnosis: ESRD on dialysis  Assessment and Plan of Treatment: c/w HD M/W/F    Diagnosis: Morbid obesity with BMI of 40.0-44.9, adult  Assessment and Plan of Treatment: advised to see nutritionist   f/u diet recommendation  life style modification    Diagnosis: Tobacco use  Assessment and Plan of Treatment: advised you to stop smoking  see PMD in community about smoking cessation.

## 2019-05-21 NOTE — DISCHARGE NOTE NURSING/CASE MANAGEMENT/SOCIAL WORK - NSDCDPATPORTLINK_GEN_ALL_CORE
You can access the [facility] Patient Portal, offered by [facility], by registering with the following website: [email or web address]

## 2019-05-21 NOTE — PROGRESS NOTE ADULT - SUBJECTIVE AND OBJECTIVE BOX
[facility] : Progress Note :: [phone], (office [phone]),   Dr La / Dr Hui / Dr Barber / Dr Villalobos / Dr Hang CASTELLANO / Dr Mehta / Dr Sanchez / Dr Alber dumont  _____________________________________________________________________________________________    s/p medical management for hyperkalemia    aspirin (Short breath)  aspirin (Swelling)  penicillin (Short breath)  penicillins (Swelling)  shellfish (Unknown)    Hospital Medications:   MEDICATIONS  (STANDING):  ALBUTerol/ipratropium for Nebulization 3 milliLiter(s) Nebulizer every 6 hours  chlorhexidine 4% Liquid 1 Application(s) Topical <User Schedule>  clopidogrel Tablet 75 milliGRAM(s) Oral daily  guaiFENesin  milliGRAM(s) Oral every 12 hours  insulin lispro (HumaLOG) corrective regimen sliding scale   SubCutaneous Before meals and at bedtime  QUEtiapine 200 milliGRAM(s) Oral daily  simvastatin 40 milliGRAM(s) Oral at bedtime        VITALS:  T(F): 98.1 (05-21-19 @ 15:52), Max: 98.7 (05-20-19 @ 16:04)  HR: 93 (05-21-19 @ 15:52)  BP: 166/104 (05-21-19 @ 15:52)  RR: 21 (05-21-19 @ 15:52)  SpO2: 99% (05-21-19 @ 15:52)  Wt(kg): --      PHYSICAL EXAM:  Constitutional: NAD  HEENT: anicteric sclera, oropharynx clear.  Neck: No JVD  Respiratory: CTAB, no wheezes, rales or rhonchi  Cardiovascular: S1, S2, RRR  Gastrointestinal: BS+, soft, NT/ND  Extremities:  peripheral edema++  Neurological: A/O x 3, no focal deficits  : No CVA tenderness. No hernandez.   Vascular Access: LUEAVF with thrill and brusoni    LABS:  05-21    135  |  95<L>  |  54<H>  ----------------------------<  314<H>  5.8<H>   |  27  |  9.40<H>    Ca    8.3<L>      21 May 2019 12:49  Phos  5.6     05-21  Mg     2.5     05-21      Creatinine Trend: 9.40 <--, 9.37 <--, 11.30 <--, 11.40 <--                        9.2    10.50 )-----------( 305      ( 21 May 2019 07:59 )             30.2     Urine Studies:      RADIOLOGY & ADDITIONAL STUDIES:
HPI:  38 y/o morbidly obese M PMH Type 2 DM, ESRD on HD (left arm graft, MWF), HTN, Asthma, Schizophrenia, Bacteremia due to Foot cellulitis presented to ED c/o worsening dyspnea w/ productive cough x 1 day - began 3 PM the day prior and worsened w/ lying supine, went to sleep w/out improvement and reportedly witnessed by father to have increased WOB while asleep - symptoms didn't improve and EMS called - report noted for c/o SOB w/ CP and bilateral diffuse wheezing. Patient recently received PRN Albuterol and has been using daily. Patient is an active smoker - 7 cigarettes a day. Otherwise denied any F, chills, abdominal pain, acute change to LE edema, or any other complaints. Patient recently discharged w/in month for LLE cellulitis and treated w/ ABx. Patient denied any compliance to medications 2/2 ineffective; does not take Lantus 2/2 reportedly normoglycemic; otherwise compliant w/ HD. (20 May 2019 08:07)      Patient is a 39y old  Male who presents with a chief complaint of dyspnea (21 May 2019 14:27)      INTERVAL HPI/OVERNIGHT EVENTS:  T(C): 36.4 (05-21-19 @ 11:35), Max: 37.1 (05-20-19 @ 16:04)  HR: 99 (05-21-19 @ 11:35) (83 - 101)  BP: 169/94 (05-21-19 @ 11:35) (151/87 - 169/94)  RR: 20 (05-21-19 @ 11:35) (18 - 30)  SpO2: 98% (05-21-19 @ 11:35) (98% - 100%)  Wt(kg): --  I&O's Summary      REVIEW OF SYSTEMS: denies fever, chills, SOB, palpitations, chest pain, abdominal pain, nausea, vomitting, diarrhea, constipation, dizziness    MEDICATIONS  (STANDING):  ALBUTerol/ipratropium for Nebulization 3 milliLiter(s) Nebulizer every 6 hours  chlorhexidine 4% Liquid 1 Application(s) Topical <User Schedule>  clopidogrel Tablet 75 milliGRAM(s) Oral daily  guaiFENesin  milliGRAM(s) Oral every 12 hours  insulin lispro (HumaLOG) corrective regimen sliding scale   SubCutaneous Before meals and at bedtime  QUEtiapine 200 milliGRAM(s) Oral daily  simvastatin 40 milliGRAM(s) Oral at bedtime  sodium polystyrene sulfonate Suspension 30 Gram(s) Oral once    MEDICATIONS  (PRN):      PHYSICAL EXAM:  GENERAL: NAD, well-groomed, well-developed  HEAD:  Atraumatic, Normocephalic  EYES: EOMI, PERRLA, conjunctiva and sclera clear  ENMT: No tonsillar erythema, exudates, or enlargement; Moist mucous membranes, Good dentition, No lesions  NECK: Supple, No JVD, Normal thyroid  NERVOUS SYSTEM:  Alert & Oriented X3, Good concentration; Motor Strength 5/5 B/L upper and lower extremities; DTRs 2+ intact and symmetric  CHEST/LUNG: Clear to percussion bilaterally; No rales, rhonchi, wheezing, or rubs  HEART: Regular rate and rhythm; No murmurs, rubs, or gallops  ABDOMEN: Soft, Nontender, Nondistended; Bowel sounds present  EXTREMITIES:  2+ Peripheral Pulses, No clubbing, cyanosis, or edema  LYMPH: No lymphadenopathy noted  SKIN: No rashes or lesions  LABS:                        9.2    10.50 )-----------( 305      ( 21 May 2019 07:59 )             30.2     05-21    135  |  95<L>  |  54<H>  ----------------------------<  314<H>  5.8<H>   |  27  |  9.40<H>    Ca    8.3<L>      21 May 2019 12:49  Phos  5.6     05-21  Mg     2.5     05-21          CAPILLARY BLOOD GLUCOSE      POCT Blood Glucose.: 337 mg/dL (21 May 2019 11:55)  POCT Blood Glucose.: 201 mg/dL (21 May 2019 08:24)  POCT Blood Glucose.: 180 mg/dL (20 May 2019 21:52)
NP Note discussed with  Primary Attending    Patient is a 39y old  Male who presents with a chief complaint of dyspnea (20 May 2019 17:21)      INTERVAL HPI/OVERNIGHT EVENTS: no new complaints    MEDICATIONS  (STANDING):  ALBUTerol/ipratropium for Nebulization 3 milliLiter(s) Nebulizer every 6 hours  chlorhexidine 4% Liquid 1 Application(s) Topical <User Schedule>  clopidogrel Tablet 75 milliGRAM(s) Oral daily  guaiFENesin  milliGRAM(s) Oral every 12 hours  insulin lispro (HumaLOG) corrective regimen sliding scale   SubCutaneous Before meals and at bedtime  methylPREDNISolone sodium succinate Injectable 40 milliGRAM(s) IV Push every 12 hours  QUEtiapine 200 milliGRAM(s) Oral daily  simvastatin 40 milliGRAM(s) Oral at bedtime    MEDICATIONS  (PRN):      __________________________________________________  REVIEW OF SYSTEMS:    CONSTITUTIONAL: No fever,   EYES: no acute visual disturbances  NECK: No pain or stiffness  RESPIRATORY: No cough; shortness of breath when he lying down in the bed   CARDIOVASCULAR: No chest pain, no palpitations  GASTROINTESTINAL: No pain. No nausea or vomiting; No diarrhea   NEUROLOGICAL: No headache or numbness, no tremors  MUSCULOSKELETAL: No joint pain, no muscle pain  GENITOURINARY: no dysuria, no frequency, no hesitancy  PSYCHIATRY: no depression , no anxiety  ALL OTHER  ROS negative        Vital Signs Last 24 Hrs  T(C): 36.4 (21 May 2019 11:35), Max: 37.1 (20 May 2019 16:04)  T(F): 97.6 (21 May 2019 11:35), Max: 98.7 (20 May 2019 16:04)  HR: 99 (21 May 2019 11:35) (83 - 101)  BP: 169/94 (21 May 2019 11:35) (151/87 - 169/94)  BP(mean): --  RR: 20 (21 May 2019 11:35) (18 - 30)  SpO2: 98% (21 May 2019 11:35) (98% - 100%)    ________________________________________________  PHYSICAL EXAM:  GENERAL: NAD  HEENT: Normocephalic;  conjunctivae and sclerae clear; moist mucous membranes;   NECK : supple  CHEST/LUNG: Clear to auscultation bilaterally with good air entry   HEART: S1 S2  regular; no murmurs, gallops or rubs  ABDOMEN: Soft, Nontender, Nondistended; Bowel sounds present  EXTREMITIES: no cyanosis; no edema; no calf tenderness (+) LT AVF - thrill/ bruit  SKIN: warm and dry; no rash  NERVOUS SYSTEM:  Awake and alert; Oriented  to place, person and time ; no new deficits    _________________________________________________  LABS:                        9.2    10.50 )-----------( 305      ( 21 May 2019 07:59 )             30.2     05-21    134<L>  |  96  |  49<H>  ----------------------------<  225<H>  6.5<HH>   |  27  |  9.37<H>    Ca    8.6      21 May 2019 07:59  Phos  5.6     05-21  Mg     2.5     05-21          CAPILLARY BLOOD GLUCOSE      POCT Blood Glucose.: 337 mg/dL (21 May 2019 11:55)  POCT Blood Glucose.: 201 mg/dL (21 May 2019 08:24)  POCT Blood Glucose.: 180 mg/dL (20 May 2019 21:52)  POCT Blood Glucose.: 302 mg/dL (20 May 2019 13:00)  POCT Blood Glucose.: 321 mg/dL (20 May 2019 12:58)        RADIOLOGY & ADDITIONAL TESTS:    Imaging Personally Reviewed:  YES/NO    Consultant(s) Notes Reviewed:   YES/ No    Care Discussed with Consultants :     Plan of care was discussed with patient and /or primary care giver; all questions and concerns were addressed and care was aligned with patient's wishes.

## 2019-05-21 NOTE — PROGRESS NOTE ADULT - PROBLEM SELECTOR PLAN 1
L AVF; + thrill; + UO   K 6.5 this morning, s/p HD last night, Kayexalate 30gm per Dr. La and f/u BMP    c/w HD M/ W F

## 2019-05-21 NOTE — DISCHARGE NOTE PROVIDER - CARE PROVIDER_API CALL
[facility] [place], Physician  [address] [place]   [address]  Phone: [phone]  Fax: (   )    -  Follow Up Time:

## 2019-05-21 NOTE — DISCHARGE NOTE PROVIDER - HOSPITAL COURSE
40 y/o morbidly obese M PMH Type 2 DM, ESRD on HD (left arm graft, MWF), HTN, Asthma, Schizophrenia, Bacteremia due to Foot cellulitis presented to ED c/o worsening dyspnea w/ productive cough x 1 day - admitting for asthma exacerbation likely 2/2 continued smoking. Seen and examined pt at bedside, AO x 3, stable, no SOB when he sits up. No cough or wheezing on exam. He is medically stable to d/c home with P.O prednisone.  HD M/W/F.

## 2019-05-21 NOTE — CHART NOTE - NSCHARTNOTEFT_GEN_A_CORE
Potassium f/u 5.5  discussed with Dr. Abhinav nath to d/c since he will be dialyzed tomorrow.   d/c home and advised pt to go for HD as scheduled

## 2019-05-21 NOTE — DISCHARGE NOTE PROVIDER - PROVIDER TOKENS
FREE:[LAST:[[employer]],FIRST:[Physician],PHONE:[phone]],FAX:[(   )    -],ADDRESS:[[phone] [id number]]]

## 2019-05-24 LAB
C ALBICANS IGE QN: <0.1 KUA/L — SIGNIFICANT CHANGE UP
CAT DANDER IGE QN: 0.92 KUA/L — HIGH
CLADOSPORIUM IGE QN: 0 — SIGNIFICANT CHANGE UP
CLADOSPORIUM IGE QN: <0.1 KUA/L — SIGNIFICANT CHANGE UP
COMMON RAGWEED IGE QN: <0.1 KUA/L — SIGNIFICANT CHANGE UP
D FARINAE IGE QN: 3.19 KUA/L — HIGH
D PTERONYSS IGE QN: 1.86 KUA/L — HIGH
DEPRECATED A ALTERNATA IGE RAST QL: 1.8 KUA/L — HIGH
DEPRECATED A FUMIGATUS IGE RAST QL: 0 — SIGNIFICANT CHANGE UP
DEPRECATED ALTERNARIA IGE RAST QL: 2
DEPRECATED C ALBICANS IGE RAST QL: 0 — SIGNIFICANT CHANGE UP
DEPRECATED CAT DANDER IGE RAST QL: 2
DEPRECATED COMMON RAGWEED IGE RAST QL: 0 — SIGNIFICANT CHANGE UP
DEPRECATED D FARINAE IGE RAST QL: 2
DEPRECATED M RACEMOSUS IGE RAST QL: 0 — SIGNIFICANT CHANGE UP
DEPRECATED ROACH IGE RAST QL: 0 — SIGNIFICANT CHANGE UP
DEPRECATED TIMOTHY IGE RAST QL: 0 — SIGNIFICANT CHANGE UP
DOG DANDER IGE QN: 0.2 KUA/L — SIGNIFICANT CHANGE UP
DOG DANDER IGE QN: SIGNIFICANT CHANGE UP
DUST ALLERG MIX2 IGE RAST: 2
MOLD ALLERG MIX A IGE QL: <0.1 KUA/L — SIGNIFICANT CHANGE UP
MOLD ALLERG MIX6 IGG QL: <0.1 KUA/L — SIGNIFICANT CHANGE UP
ROACH IGE QN: <0.1 KUA/L — SIGNIFICANT CHANGE UP
TIMOTHY IGE QN: <0.1 KUA/L — SIGNIFICANT CHANGE UP
TREE ALLERG MIX1 IGE QL: 0.76 KUA/L — HIGH
TREE ALLERG MIX1 IGE RAST: 2

## 2019-05-28 ENCOUNTER — INPATIENT (INPATIENT)
Facility: HOSPITAL | Age: 39
LOS: 7 days | Discharge: HOME | DRG: 555 | End: 2019-06-05
Attending: INTERNAL MEDICINE | Admitting: INTERNAL MEDICINE
Payer: MEDICARE

## 2019-05-28 VITALS
HEART RATE: 96 BPM | RESPIRATION RATE: 20 BRPM | SYSTOLIC BLOOD PRESSURE: 161 MMHG | TEMPERATURE: 99 F | WEIGHT: 315 LBS | HEIGHT: 78 IN | OXYGEN SATURATION: 100 % | DIASTOLIC BLOOD PRESSURE: 103 MMHG

## 2019-05-28 DIAGNOSIS — Z98.890 OTHER SPECIFIED POSTPROCEDURAL STATES: Chronic | ICD-10-CM

## 2019-05-28 PROCEDURE — 73562 X-RAY EXAM OF KNEE 3: CPT | Mod: 26,LT

## 2019-05-28 PROCEDURE — 71045 X-RAY EXAM CHEST 1 VIEW: CPT | Mod: 26

## 2019-05-28 RX ORDER — TRAMADOL HYDROCHLORIDE 50 MG/1
50 TABLET ORAL ONCE
Refills: 0 | Status: DISCONTINUED | OUTPATIENT
Start: 2019-05-28 | End: 2019-05-28

## 2019-05-28 RX ORDER — MORPHINE SULFATE 50 MG/1
4 CAPSULE, EXTENDED RELEASE ORAL ONCE
Refills: 0 | Status: DISCONTINUED | OUTPATIENT
Start: 2019-05-28 | End: 2019-05-28

## 2019-05-28 RX ORDER — ACETAMINOPHEN 500 MG
1000 TABLET ORAL ONCE
Refills: 0 | Status: COMPLETED | OUTPATIENT
Start: 2019-05-28 | End: 2019-05-29

## 2019-05-28 RX ADMIN — TRAMADOL HYDROCHLORIDE 50 MILLIGRAM(S): 50 TABLET ORAL at 22:16

## 2019-05-28 RX ADMIN — TRAMADOL HYDROCHLORIDE 50 MILLIGRAM(S): 50 TABLET ORAL at 23:08

## 2019-05-28 NOTE — ED PROVIDER NOTE - CLINICAL SUMMARY MEDICAL DECISION MAKING FREE TEXT BOX
38 y/o morbidly obese M pt with ESRD on hemodialysis presents to ED c/o acute on chronic L knee pain w/o recent trauma. Will obtain labs, knee X-ray. Given morphine and Tylenol IV for pain, will reassess. 38 y/o morbidly obese M pt with ESRD on hemodialysis presents to ED c/o acute on chronic L knee pain w/o recent trauma. Will obtain labs, knee X-ray. Given morphine and Tylenol IV for pain, will reassess.    labs show K 5.5-similar to prior labs, EKG shows no hyperkalemic changes, CXR shows evidence of pulm edema  while in ED patient suddenly became short of breath reporting that when he repositioned himself he had severe L knee which made him short of breath, O2sat 87% on RA, placed on NRM with improvement of symptoms, now on RA with O2sat 94% breathing comfortably but reports persistent L knee pain  XR knee unremarkable, CT knee shows skin edema but no knee abnormalities    Discussed above with Dr. La-nephrologist who will arange for HD today  Dr. Andino agrees with plan for admission

## 2019-05-28 NOTE — ED PROVIDER NOTE - INPATIENT RESIDENT/ACP NOTIFIED DATE
Thank you for choosing the [facility] [place], Christy Lopez NP and Jonelle Bernstein, and our team as your Neurology Specialists.  We actively use feedback to constantly improve and deliver the best care possible. To provide the best experience we are collecting feedback from you on how we performed.  You may receive a survey in the mail to evaluate how we did. Please take a moment and share your thoughts.      If for any reason you feel that we did not meet your expectations or you want to share a positive experience, please give us a call. Your feedback helps us know how we are doing and what we can be doing better.    Office hours: 8:00 am to 4:30 pm, Monday - Friday  Phone: [phone]       29-May-2019 02:51

## 2019-05-28 NOTE — ED PROVIDER NOTE - EXTREMITY EXAM
b/l lower extremity edema. Tenderness over the anterior L knee but without erythema or warmth. Decreased knee flexion/extension secondary to knee pain

## 2019-05-28 NOTE — ED ADULT TRIAGE NOTE - CHIEF COMPLAINT QUOTE
BIBA c/o left knee pain, denies injury, pt states he is short of breath because of the pain, pt is morbidly obese

## 2019-05-28 NOTE — PROGRESS NOTE ADULT - SUBJECTIVE AND OBJECTIVE BOX
obese, esrd on hd , cocain abuse, drug seeling behaviour, misses HD and then come to er for HD, today one of multiple er visit to er today pt c/o lt knee pain x 1 day.  no fall, no fever   seen and examined vsstable afebrile lt knee mild tenderness  not swollen.  before touching knee. pt was very comfrtable

## 2019-05-29 ENCOUNTER — TRANSCRIPTION ENCOUNTER (OUTPATIENT)
Age: 39
End: 2019-05-29

## 2019-05-29 DIAGNOSIS — E66.01 MORBID (SEVERE) OBESITY DUE TO EXCESS CALORIES: ICD-10-CM

## 2019-05-29 DIAGNOSIS — N18.6 END STAGE RENAL DISEASE: ICD-10-CM

## 2019-05-29 DIAGNOSIS — J81.1 CHRONIC PULMONARY EDEMA: ICD-10-CM

## 2019-05-29 DIAGNOSIS — L03.116 CELLULITIS OF LEFT LOWER LIMB: ICD-10-CM

## 2019-05-29 DIAGNOSIS — Z29.9 ENCOUNTER FOR PROPHYLACTIC MEASURES, UNSPECIFIED: ICD-10-CM

## 2019-05-29 DIAGNOSIS — E87.5 HYPERKALEMIA: ICD-10-CM

## 2019-05-29 DIAGNOSIS — M25.562 PAIN IN LEFT KNEE: ICD-10-CM

## 2019-05-29 DIAGNOSIS — J96.02 ACUTE RESPIRATORY FAILURE WITH HYPERCAPNIA: ICD-10-CM

## 2019-05-29 DIAGNOSIS — F31.9 BIPOLAR DISORDER, UNSPECIFIED: ICD-10-CM

## 2019-05-29 DIAGNOSIS — E11.9 TYPE 2 DIABETES MELLITUS WITHOUT COMPLICATIONS: ICD-10-CM

## 2019-05-29 DIAGNOSIS — I10 ESSENTIAL (PRIMARY) HYPERTENSION: ICD-10-CM

## 2019-05-29 LAB
24R-OH-CALCIDIOL SERPL-MCNC: 24.5 NG/ML — LOW (ref 30–80)
ALBUMIN SERPL ELPH-MCNC: 3.1 G/DL — LOW (ref 3.5–5)
ALP SERPL-CCNC: 110 U/L — SIGNIFICANT CHANGE UP (ref 40–120)
ALT FLD-CCNC: 38 U/L DA — SIGNIFICANT CHANGE UP (ref 10–60)
AMPHET UR-MCNC: NEGATIVE — SIGNIFICANT CHANGE UP
ANION GAP SERPL CALC-SCNC: 11 MMOL/L — SIGNIFICANT CHANGE UP (ref 5–17)
ANION GAP SERPL CALC-SCNC: 16 MMOL/L — SIGNIFICANT CHANGE UP (ref 5–17)
APTT BLD: 22.4 SEC — LOW (ref 27.5–36.3)
AST SERPL-CCNC: 32 U/L — SIGNIFICANT CHANGE UP (ref 10–40)
BARBITURATES UR SCN-MCNC: NEGATIVE — SIGNIFICANT CHANGE UP
BASE EXCESS BLDA CALC-SCNC: -0.3 MMOL/L — SIGNIFICANT CHANGE UP (ref -2–2)
BENZODIAZ UR-MCNC: NEGATIVE — SIGNIFICANT CHANGE UP
BILIRUB SERPL-MCNC: 0.4 MG/DL — SIGNIFICANT CHANGE UP (ref 0.2–1.2)
BUN SERPL-MCNC: 62 MG/DL — HIGH (ref 7–18)
BUN SERPL-MCNC: 86 MG/DL — HIGH (ref 7–18)
CALCIUM SERPL-MCNC: 6.4 MG/DL — CRITICAL LOW (ref 8.4–10.5)
CALCIUM SERPL-MCNC: 6.9 MG/DL — LOW (ref 8.4–10.5)
CALCIUM SERPL-MCNC: 7.1 MG/DL — LOW (ref 8.4–10.5)
CHLORIDE SERPL-SCNC: 95 MMOL/L — LOW (ref 96–108)
CHLORIDE SERPL-SCNC: 96 MMOL/L — SIGNIFICANT CHANGE UP (ref 96–108)
CO2 SERPL-SCNC: 25 MMOL/L — SIGNIFICANT CHANGE UP (ref 22–31)
CO2 SERPL-SCNC: 29 MMOL/L — SIGNIFICANT CHANGE UP (ref 22–31)
COCAINE METAB.OTHER UR-MCNC: POSITIVE
CREAT SERPL-MCNC: 10.5 MG/DL — HIGH (ref 0.5–1.3)
CREAT SERPL-MCNC: 13.2 MG/DL — HIGH (ref 0.5–1.3)
D DIMER BLD IA.RAPID-MCNC: 2065 NG/ML DDU — HIGH
GLUCOSE BLDC GLUCOMTR-MCNC: 102 MG/DL — HIGH (ref 70–99)
GLUCOSE BLDC GLUCOMTR-MCNC: 106 MG/DL — HIGH (ref 70–99)
GLUCOSE BLDC GLUCOMTR-MCNC: 136 MG/DL — HIGH (ref 70–99)
GLUCOSE BLDC GLUCOMTR-MCNC: 196 MG/DL — HIGH (ref 70–99)
GLUCOSE SERPL-MCNC: 122 MG/DL — HIGH (ref 70–99)
GLUCOSE SERPL-MCNC: 172 MG/DL — HIGH (ref 70–99)
HCO3 BLDA-SCNC: 26 MMOL/L — SIGNIFICANT CHANGE UP (ref 23–27)
HCT VFR BLD CALC: 28.6 % — LOW (ref 39–50)
HGB BLD-MCNC: 8.8 G/DL — LOW (ref 13–17)
HOROWITZ INDEX BLDA+IHG-RTO: 100 — SIGNIFICANT CHANGE UP
INR BLD: 0.96 RATIO — SIGNIFICANT CHANGE UP (ref 0.88–1.16)
MAGNESIUM SERPL-MCNC: 2 MG/DL — SIGNIFICANT CHANGE UP (ref 1.6–2.6)
MCHC RBC-ENTMCNC: 30.1 PG — SIGNIFICANT CHANGE UP (ref 27–34)
MCHC RBC-ENTMCNC: 30.8 GM/DL — LOW (ref 32–36)
MCV RBC AUTO: 97.9 FL — SIGNIFICANT CHANGE UP (ref 80–100)
METHADONE UR-MCNC: NEGATIVE — SIGNIFICANT CHANGE UP
NRBC # BLD: 0 /100 WBCS — SIGNIFICANT CHANGE UP (ref 0–0)
NT-PROBNP SERPL-SCNC: HIGH PG/ML (ref 0–125)
OPIATES UR-MCNC: NEGATIVE — SIGNIFICANT CHANGE UP
PCO2 BLDA: 51 MMHG — HIGH (ref 32–46)
PCP SPEC-MCNC: SIGNIFICANT CHANGE UP
PCP UR-MCNC: NEGATIVE — SIGNIFICANT CHANGE UP
PH BLDA: 7.32 — LOW (ref 7.35–7.45)
PHOSPHATE SERPL-MCNC: 8.2 MG/DL — HIGH (ref 2.5–4.5)
PLATELET # BLD AUTO: 229 K/UL — SIGNIFICANT CHANGE UP (ref 150–400)
PO2 BLDA: 223 MMHG — HIGH (ref 74–108)
POTASSIUM SERPL-MCNC: 4.4 MMOL/L — SIGNIFICANT CHANGE UP (ref 3.5–5.3)
POTASSIUM SERPL-MCNC: 5.5 MMOL/L — HIGH (ref 3.5–5.3)
POTASSIUM SERPL-SCNC: 4.4 MMOL/L — SIGNIFICANT CHANGE UP (ref 3.5–5.3)
POTASSIUM SERPL-SCNC: 5.5 MMOL/L — HIGH (ref 3.5–5.3)
PROT SERPL-MCNC: 7.5 G/DL — SIGNIFICANT CHANGE UP (ref 6–8.3)
PROTHROM AB SERPL-ACNC: 10.6 SEC — SIGNIFICANT CHANGE UP (ref 10–12.9)
PTH-INTACT FLD-MCNC: 955 PG/ML — HIGH (ref 15–65)
RBC # BLD: 2.92 M/UL — LOW (ref 4.2–5.8)
RBC # FLD: 15.7 % — HIGH (ref 10.3–14.5)
SAO2 % BLDA: 100 % — HIGH (ref 92–96)
SODIUM SERPL-SCNC: 135 MMOL/L — SIGNIFICANT CHANGE UP (ref 135–145)
SODIUM SERPL-SCNC: 137 MMOL/L — SIGNIFICANT CHANGE UP (ref 135–145)
THC UR QL: NEGATIVE — SIGNIFICANT CHANGE UP
TROPONIN I SERPL-MCNC: 0.02 NG/ML — SIGNIFICANT CHANGE UP (ref 0–0.04)
VIT D25+D1,25 OH+D1,25 PNL SERPL-MCNC: 14.6 PG/ML — LOW (ref 19.9–79.3)
WBC # BLD: 10.08 K/UL — SIGNIFICANT CHANGE UP (ref 3.8–10.5)
WBC # FLD AUTO: 10.08 K/UL — SIGNIFICANT CHANGE UP (ref 3.8–10.5)

## 2019-05-29 PROCEDURE — 73700 CT LOWER EXTREMITY W/O DYE: CPT | Mod: 26,LT

## 2019-05-29 PROCEDURE — 99285 EMERGENCY DEPT VISIT HI MDM: CPT

## 2019-05-29 PROCEDURE — 99222 1ST HOSP IP/OBS MODERATE 55: CPT

## 2019-05-29 PROCEDURE — 93971 EXTREMITY STUDY: CPT | Mod: 26,LT

## 2019-05-29 RX ORDER — ALBUTEROL 90 UG/1
2.5 AEROSOL, METERED ORAL
Refills: 0 | Status: COMPLETED | OUTPATIENT
Start: 2019-05-29 | End: 2019-05-29

## 2019-05-29 RX ORDER — ALBUTEROL 90 UG/1
1 AEROSOL, METERED ORAL EVERY 4 HOURS
Refills: 0 | Status: DISCONTINUED | OUTPATIENT
Start: 2019-05-29 | End: 2019-06-05

## 2019-05-29 RX ORDER — FUROSEMIDE 40 MG
80 TABLET ORAL DAILY
Refills: 0 | Status: DISCONTINUED | OUTPATIENT
Start: 2019-05-29 | End: 2019-06-05

## 2019-05-29 RX ORDER — CLOPIDOGREL BISULFATE 75 MG/1
75 TABLET, FILM COATED ORAL DAILY
Refills: 0 | Status: DISCONTINUED | OUTPATIENT
Start: 2019-05-29 | End: 2019-06-05

## 2019-05-29 RX ORDER — CHLORHEXIDINE GLUCONATE 213 G/1000ML
1 SOLUTION TOPICAL ONCE
Refills: 0 | Status: DISCONTINUED | OUTPATIENT
Start: 2019-05-29 | End: 2019-05-29

## 2019-05-29 RX ORDER — HYDRALAZINE HCL 50 MG
50 TABLET ORAL THREE TIMES A DAY
Refills: 0 | Status: DISCONTINUED | OUTPATIENT
Start: 2019-05-29 | End: 2019-06-05

## 2019-05-29 RX ORDER — HYDROMORPHONE HYDROCHLORIDE 2 MG/ML
0.5 INJECTION INTRAMUSCULAR; INTRAVENOUS; SUBCUTANEOUS ONCE
Refills: 0 | Status: DISCONTINUED | OUTPATIENT
Start: 2019-05-29 | End: 2019-05-29

## 2019-05-29 RX ORDER — CALCIUM GLUCONATE 100 MG/ML
2 VIAL (ML) INTRAVENOUS ONCE
Refills: 0 | Status: COMPLETED | OUTPATIENT
Start: 2019-05-29 | End: 2019-05-29

## 2019-05-29 RX ORDER — INSULIN LISPRO 100/ML
VIAL (ML) SUBCUTANEOUS
Refills: 0 | Status: DISCONTINUED | OUTPATIENT
Start: 2019-05-29 | End: 2019-06-05

## 2019-05-29 RX ORDER — HYDRALAZINE HCL 50 MG
10 TABLET ORAL THREE TIMES A DAY
Refills: 0 | Status: DISCONTINUED | OUTPATIENT
Start: 2019-05-29 | End: 2019-05-29

## 2019-05-29 RX ORDER — CHLORHEXIDINE GLUCONATE 213 G/1000ML
1 SOLUTION TOPICAL
Refills: 0 | Status: DISCONTINUED | OUTPATIENT
Start: 2019-05-29 | End: 2019-06-05

## 2019-05-29 RX ORDER — HYDROMORPHONE HYDROCHLORIDE 2 MG/ML
4 INJECTION INTRAMUSCULAR; INTRAVENOUS; SUBCUTANEOUS ONCE
Refills: 0 | Status: DISCONTINUED | OUTPATIENT
Start: 2019-05-29 | End: 2019-05-29

## 2019-05-29 RX ORDER — VANCOMYCIN HCL 1 G
1000 VIAL (EA) INTRAVENOUS ONCE
Refills: 0 | Status: COMPLETED | OUTPATIENT
Start: 2019-05-29 | End: 2019-05-29

## 2019-05-29 RX ORDER — QUETIAPINE FUMARATE 200 MG/1
200 TABLET, FILM COATED ORAL DAILY
Refills: 0 | Status: DISCONTINUED | OUTPATIENT
Start: 2019-05-29 | End: 2019-05-29

## 2019-05-29 RX ORDER — HYDRALAZINE HCL 50 MG
1 TABLET ORAL
Qty: 0 | Refills: 0 | DISCHARGE

## 2019-05-29 RX ORDER — ERYTHROPOIETIN 10000 [IU]/ML
10000 INJECTION, SOLUTION INTRAVENOUS; SUBCUTANEOUS
Refills: 0 | Status: COMPLETED | OUTPATIENT
Start: 2019-05-29 | End: 2019-06-03

## 2019-05-29 RX ORDER — QUETIAPINE FUMARATE 200 MG/1
200 TABLET, FILM COATED ORAL DAILY
Refills: 0 | Status: DISCONTINUED | OUTPATIENT
Start: 2019-05-29 | End: 2019-06-05

## 2019-05-29 RX ORDER — INSULIN GLARGINE 100 [IU]/ML
10 INJECTION, SOLUTION SUBCUTANEOUS AT BEDTIME
Refills: 0 | Status: DISCONTINUED | OUTPATIENT
Start: 2019-05-29 | End: 2019-06-05

## 2019-05-29 RX ORDER — ACETAMINOPHEN 500 MG
1000 TABLET ORAL ONCE
Refills: 0 | Status: COMPLETED | OUTPATIENT
Start: 2019-05-29 | End: 2019-05-29

## 2019-05-29 RX ORDER — METOPROLOL TARTRATE 50 MG
25 TABLET ORAL
Refills: 0 | Status: DISCONTINUED | OUTPATIENT
Start: 2019-05-29 | End: 2019-06-05

## 2019-05-29 RX ORDER — SIMVASTATIN 20 MG/1
40 TABLET, FILM COATED ORAL AT BEDTIME
Refills: 0 | Status: DISCONTINUED | OUTPATIENT
Start: 2019-05-29 | End: 2019-06-05

## 2019-05-29 RX ORDER — AMLODIPINE BESYLATE 2.5 MG/1
2.5 TABLET ORAL DAILY
Refills: 0 | Status: DISCONTINUED | OUTPATIENT
Start: 2019-05-29 | End: 2019-05-29

## 2019-05-29 RX ORDER — HEPARIN SODIUM 5000 [USP'U]/ML
5000 INJECTION INTRAVENOUS; SUBCUTANEOUS EVERY 12 HOURS
Refills: 0 | Status: DISCONTINUED | OUTPATIENT
Start: 2019-05-29 | End: 2019-06-05

## 2019-05-29 RX ORDER — SEVELAMER CARBONATE 2400 MG/1
3 POWDER, FOR SUSPENSION ORAL
Qty: 270 | Refills: 0
Start: 2019-05-29 | End: 2019-06-27

## 2019-05-29 RX ORDER — SEVELAMER CARBONATE 2400 MG/1
2400 POWDER, FOR SUSPENSION ORAL
Refills: 0 | Status: DISCONTINUED | OUTPATIENT
Start: 2019-05-29 | End: 2019-06-05

## 2019-05-29 RX ORDER — SODIUM POLYSTYRENE SULFONATE 4.1 MEQ/G
30 POWDER, FOR SUSPENSION ORAL ONCE
Refills: 0 | Status: COMPLETED | OUTPATIENT
Start: 2019-05-29 | End: 2019-05-29

## 2019-05-29 RX ORDER — TIOTROPIUM BROMIDE 18 UG/1
1 CAPSULE ORAL; RESPIRATORY (INHALATION) DAILY
Refills: 0 | Status: DISCONTINUED | OUTPATIENT
Start: 2019-05-29 | End: 2019-06-05

## 2019-05-29 RX ORDER — HYDROMORPHONE HYDROCHLORIDE 2 MG/ML
1 INJECTION INTRAMUSCULAR; INTRAVENOUS; SUBCUTANEOUS ONCE
Refills: 0 | Status: DISCONTINUED | OUTPATIENT
Start: 2019-05-29 | End: 2019-05-29

## 2019-05-29 RX ORDER — PANTOPRAZOLE SODIUM 20 MG/1
40 TABLET, DELAYED RELEASE ORAL
Refills: 0 | Status: DISCONTINUED | OUTPATIENT
Start: 2019-05-29 | End: 2019-06-05

## 2019-05-29 RX ORDER — QUETIAPINE FUMARATE 200 MG/1
1 TABLET, FILM COATED ORAL
Qty: 0 | Refills: 0 | DISCHARGE

## 2019-05-29 RX ORDER — IPRATROPIUM/ALBUTEROL SULFATE 18-103MCG
3 AEROSOL WITH ADAPTER (GRAM) INHALATION EVERY 6 HOURS
Refills: 0 | Status: DISCONTINUED | OUTPATIENT
Start: 2019-05-29 | End: 2019-06-05

## 2019-05-29 RX ORDER — INSULIN GLARGINE 100 [IU]/ML
8 INJECTION, SOLUTION SUBCUTANEOUS
Qty: 0 | Refills: 0 | DISCHARGE

## 2019-05-29 RX ORDER — FERROUS SULFATE 325(65) MG
325 TABLET ORAL DAILY
Refills: 0 | Status: DISCONTINUED | OUTPATIENT
Start: 2019-05-29 | End: 2019-06-05

## 2019-05-29 RX ORDER — CHLORHEXIDINE GLUCONATE 213 G/1000ML
1 SOLUTION TOPICAL
Refills: 0 | Status: DISCONTINUED | OUTPATIENT
Start: 2019-05-29 | End: 2019-05-29

## 2019-05-29 RX ORDER — FUROSEMIDE 40 MG
40 TABLET ORAL ONCE
Refills: 0 | Status: COMPLETED | OUTPATIENT
Start: 2019-05-29 | End: 2019-05-29

## 2019-05-29 RX ORDER — INSULIN LISPRO 100/ML
VIAL (ML) SUBCUTANEOUS AT BEDTIME
Refills: 0 | Status: DISCONTINUED | OUTPATIENT
Start: 2019-05-29 | End: 2019-06-05

## 2019-05-29 RX ORDER — AMLODIPINE BESYLATE 2.5 MG/1
5 TABLET ORAL DAILY
Refills: 0 | Status: DISCONTINUED | OUTPATIENT
Start: 2019-05-29 | End: 2019-06-05

## 2019-05-29 RX ORDER — HYDRALAZINE HCL 50 MG
1 TABLET ORAL
Qty: 0 | Refills: 0 | DISCHARGE
Start: 2019-05-29

## 2019-05-29 RX ADMIN — Medication 50 MILLIGRAM(S): at 23:50

## 2019-05-29 RX ADMIN — PANTOPRAZOLE SODIUM 40 MILLIGRAM(S): 20 TABLET, DELAYED RELEASE ORAL at 09:46

## 2019-05-29 RX ADMIN — Medication 25 MILLIGRAM(S): at 09:43

## 2019-05-29 RX ADMIN — Medication 250 MILLIGRAM(S): at 10:38

## 2019-05-29 RX ADMIN — Medication 3 MILLILITER(S): at 23:50

## 2019-05-29 RX ADMIN — Medication 1000 MILLIGRAM(S): at 10:59

## 2019-05-29 RX ADMIN — HYDROMORPHONE HYDROCHLORIDE 4 MILLIGRAM(S): 2 INJECTION INTRAMUSCULAR; INTRAVENOUS; SUBCUTANEOUS at 13:06

## 2019-05-29 RX ADMIN — AMLODIPINE BESYLATE 5 MILLIGRAM(S): 2.5 TABLET ORAL at 09:44

## 2019-05-29 RX ADMIN — HYDROMORPHONE HYDROCHLORIDE 4 MILLIGRAM(S): 2 INJECTION INTRAMUSCULAR; INTRAVENOUS; SUBCUTANEOUS at 14:00

## 2019-05-29 RX ADMIN — Medication 200 GRAM(S): at 03:20

## 2019-05-29 RX ADMIN — CLOPIDOGREL BISULFATE 75 MILLIGRAM(S): 75 TABLET, FILM COATED ORAL at 11:33

## 2019-05-29 RX ADMIN — HYDROMORPHONE HYDROCHLORIDE 1 MILLIGRAM(S): 2 INJECTION INTRAMUSCULAR; INTRAVENOUS; SUBCUTANEOUS at 04:20

## 2019-05-29 RX ADMIN — MORPHINE SULFATE 4 MILLIGRAM(S): 50 CAPSULE, EXTENDED RELEASE ORAL at 02:33

## 2019-05-29 RX ADMIN — Medication 80 MILLIGRAM(S): at 09:43

## 2019-05-29 RX ADMIN — Medication 400 MILLIGRAM(S): at 00:41

## 2019-05-29 RX ADMIN — ALBUTEROL 2.5 MILLIGRAM(S): 90 AEROSOL, METERED ORAL at 03:36

## 2019-05-29 RX ADMIN — Medication 40 MILLIGRAM(S): at 00:41

## 2019-05-29 RX ADMIN — SEVELAMER CARBONATE 2400 MILLIGRAM(S): 2400 POWDER, FOR SUSPENSION ORAL at 11:33

## 2019-05-29 RX ADMIN — Medication 1000 MILLIGRAM(S): at 02:33

## 2019-05-29 RX ADMIN — Medication 1: at 11:34

## 2019-05-29 RX ADMIN — SIMVASTATIN 40 MILLIGRAM(S): 20 TABLET, FILM COATED ORAL at 23:50

## 2019-05-29 RX ADMIN — MORPHINE SULFATE 4 MILLIGRAM(S): 50 CAPSULE, EXTENDED RELEASE ORAL at 00:42

## 2019-05-29 RX ADMIN — Medication 325 MILLIGRAM(S): at 11:33

## 2019-05-29 RX ADMIN — ALBUTEROL 2.5 MILLIGRAM(S): 90 AEROSOL, METERED ORAL at 03:35

## 2019-05-29 RX ADMIN — QUETIAPINE FUMARATE 200 MILLIGRAM(S): 200 TABLET, FILM COATED ORAL at 11:33

## 2019-05-29 RX ADMIN — HYDROMORPHONE HYDROCHLORIDE 0.5 MILLIGRAM(S): 2 INJECTION INTRAMUSCULAR; INTRAVENOUS; SUBCUTANEOUS at 02:36

## 2019-05-29 RX ADMIN — HEPARIN SODIUM 5000 UNIT(S): 5000 INJECTION INTRAVENOUS; SUBCUTANEOUS at 09:44

## 2019-05-29 RX ADMIN — HYDROMORPHONE HYDROCHLORIDE 0.5 MILLIGRAM(S): 2 INJECTION INTRAMUSCULAR; INTRAVENOUS; SUBCUTANEOUS at 02:38

## 2019-05-29 RX ADMIN — Medication 400 MILLIGRAM(S): at 08:49

## 2019-05-29 RX ADMIN — HYDROMORPHONE HYDROCHLORIDE 1 MILLIGRAM(S): 2 INJECTION INTRAMUSCULAR; INTRAVENOUS; SUBCUTANEOUS at 09:26

## 2019-05-29 RX ADMIN — ALBUTEROL 2.5 MILLIGRAM(S): 90 AEROSOL, METERED ORAL at 03:21

## 2019-05-29 RX ADMIN — HEPARIN SODIUM 5000 UNIT(S): 5000 INJECTION INTRAVENOUS; SUBCUTANEOUS at 20:04

## 2019-05-29 RX ADMIN — SEVELAMER CARBONATE 2400 MILLIGRAM(S): 2400 POWDER, FOR SUSPENSION ORAL at 09:42

## 2019-05-29 RX ADMIN — Medication 25 MILLIGRAM(S): at 20:03

## 2019-05-29 NOTE — H&P ADULT - PROBLEM SELECTOR PLAN 8
patient on lantus and novolog at home   continue with basal/bolus insulin regime   sliding scale   diabetic diet

## 2019-05-29 NOTE — CONSULT NOTE ADULT - SUBJECTIVE AND OBJECTIVE BOX
Chief Complaint:    HPI:  39 Male with PMH of Type 2 DM, Migraine, cocaine abuse last dose week ago, ESRD on HD (left arm graft, M/W/F), Morbid obesity, HTN, Chr dyspnea (uses home O2 prn), Schizophrenia, Bacteremia due to foot cellulitis presented to ED c/o left knee pain for 2 days. Pain describes knee pain as sharp, 10/10 in intensity, non radiating, worse with movement, and unable to ambulate. . Pt reports that yesterday after riding in a car he was unable to get out secondary to severe pain in the L knee. Pt states that his PCP referred him to orthopedist Dr. Velazquez and he has a scheduled appointment on Anya 3. Pt relates an ACL injury when he was a teenager and denies any recent trauma. Pt endorses that his knee feels swollen. Patient also report of difficulty breathing since 1 day, he use his albuterol inhalers multiple times with no significant improvement in his symptoms. Patient missed his HD on Monday because he was unable to go to HD centre due to knee pain. patient denies fever, chills, headaches, chest pain, palpitation, nausea, vomiting, diarrhea, abdominal pain/distension, dysuria, skin rashes.  patient used cocaine last week    on admission pt was afebrile, hypertensive, O2 sat 100 on suppl O2  cbc showed no leucocytosis, Hb 8.8  bmp hyperkalemia K 5.5 No EKG changes  elevated BUN/cr   ABG - ( 29 May 2019 00:45 )  pH, Arterial: 7.32  pH, Blood: x     /  pCO2: 51    /  pO2: 223   / HCO3: 26    / Base Excess: -0.3  /  SaO2: 100     CT scan of knee Circumferential infiltration of the subcutaneous soft tissues about the left knee without evidence of a drainable collection. No areas of cortical destruction to suggest osteomyelitis. concern of cellulites  CXR showed congestion ++ (29 May 2019 03:53)      Pain Level:   Scale: VAS    PAST MEDICAL & SURGICAL HISTORY:  Migraine  HTN (hypertension)  DM (diabetes mellitus)  Bipolar disorder  Schizophrenia  ESRD on dialysis  Morbid obesity with BMI of 40.0-44.9, adult  H/O hernia repair      FAMILY HISTORY:  Family history of COPD (chronic obstructive pulmonary disease)  Family history of diabetes mellitus      SOCIAL HISTORY:  [ ] Denies Smoking, Alcohol, or Drug Use    Allergies    aspirin (Short breath)  aspirin (Swelling)  penicillin (Short breath)  penicillins (Swelling)  shellfish (Unknown)    Intolerances        PAIN MEDICATIONS:  QUEtiapine 200 milliGRAM(s) Oral daily      Heme:  clopidogrel Tablet 75 milliGRAM(s) Oral daily  heparin  Injectable 5000 Unit(s) SubCutaneous every 12 hours    Antibiotics:    Cardiovascular:  amLODIPine   Tablet 5 milliGRAM(s) Oral daily  furosemide    Tablet 80 milliGRAM(s) Oral daily  hydrALAZINE 50 milliGRAM(s) Oral three times a day  metoprolol tartrate 25 milliGRAM(s) Oral two times a day    GI:  pantoprazole    Tablet 40 milliGRAM(s) Oral before breakfast    Endocrine:  insulin glargine Injectable (LANTUS) 10 Unit(s) SubCutaneous at bedtime  insulin lispro (HumaLOG) corrective regimen sliding scale   SubCutaneous three times a day before meals  insulin lispro (HumaLOG) corrective regimen sliding scale   SubCutaneous at bedtime  simvastatin 40 milliGRAM(s) Oral at bedtime    All Other Medications:  chlorhexidine 4% Liquid 1 Application(s) Topical <User Schedule>  ferrous    sulfate 325 milliGRAM(s) Oral daily  sevelamer carbonate 2400 milliGRAM(s) Oral three times a day with meals      REVIEW OF SYSTEMS:    CONSTITUTIONAL: No fever, weight loss, or fatigue  RESPIRATORY: No cough, wheezing, chills, or hemoptysis, No SOB  NECK: No neck pain  CARDIOVASCULAR: No chest pain, palpitations, dizziness, or leg swelling  GASTROINTESTINAL: No abdominal or epigastric pain.  No nausea, vomiting, or hematemesis.  No diarrhea. + constipation.  GENITOURINARY: No dysuria, frequency, hematuria or incontinence  NEUROLOGICAL: No headaches, memory loss, loss of strength, numbness or tremors  MUSCULOSKELETAL: No joint pain or swelling, No muscle or back pain    Vital Signs Last 24 Hrs  T(C): 36.7 (29 May 2019 07:25), Max: 37.2 (28 May 2019 21:21)  T(F): 98 (29 May 2019 07:25), Max: 98.9 (28 May 2019 21:21)  HR: 84 (29 May 2019 07:25) (84 - 97)  BP: 160/95 (29 May 2019 07:25) (137/79 - 163/103)  BP(mean): --  RR: 16 (29 May 2019 07:25) (14 - 20)  SpO2: 100% (29 May 2019 07:25) (98% - 100%)    PAIN SCORE:         SCALE USED: (1-10 VNRS)    PHYSICAL EXAM:    GENERAL: NAD, well-groomed, well-developed   NERVOUS SYSTEM: Alert and oriented x3, Motor strength 5/5 B/L upper and lower extremities, SLR -   CHEST/LUNG: Clear to percussion bilaterally, no rale, rhonchi or wheezing  HEART: Regular rate and rhythm, No murmurs, rubs, or gallops   ABDOMEN: Soft, nontender, nondistended, Bowel sounds present  BACK: No CVA tenderness, No paravetebral tenderness  EXTREMITIES: +2 periperal extremities, no edema, cyanosis + decreased rom     LABS:                          8.8    10.08 )-----------( 229      ( 29 May 2019 00:37 )             28.6     05-29    135  |  95<L>  |  62<H>  ----------------------------<  172<H>  4.4   |  29  |  10.50<H>    Ca    6.9<L>      29 May 2019 10:21  Phos  8.2     05-29  Mg     2.0     05-29    TPro  7.5  /  Alb  3.1<L>  /  TBili  0.4  /  DBili  x   /  AST  32  /  ALT  38  /  AlkPhos  110  05-29    PT/INR - ( 29 May 2019 00:37 )   PT: 10.6 sec;   INR: 0.96 ratio         PTT - ( 29 May 2019 00:37 )  PTT:22.4 sec      RADIOLOGY:    Drug Screen:        RECOMMENDATIONS    [ ]  [place]  Reviewed and Copied to Chart Chief Complaint: left knee pain    HPI:  39 Male with PMH of Type 2 DM, Migraine, cocaine abuse last dose week ago, ESRD on HD (left arm graft, M/W/F), Morbid obesity, HTN, Chr dyspnea (uses home O2 prn), Schizophrenia, Bacteremia due to foot cellulitis presented to ED c/o left knee pain for 2 days. Pain describes knee pain as sharp, 10/10 in intensity, non radiating, worse with movement, and unable to ambulate. . Pt reports that yesterday after riding in a car he was unable to get out secondary to severe pain in the L knee. Pt states that his PCP referred him to orthopedist Dr. Velazquez and he has a scheduled appointment on Anya 3. Pt relates an ACL injury when he was a teenager and denies any recent trauma. Pt endorses that his knee feels swollen. Patient also report of difficulty breathing since 1 day, he use his albuterol inhalers multiple times with no significant improvement in his symptoms. Patient missed his HD on Monday because he was unable to go to HD centre due to knee pain. patient denies fever, chills, headaches, chest pain, palpitation, nausea, vomiting, diarrhea, abdominal pain/distension, dysuria, skin rashes.  patient used cocaine last week.    39 year old male with frequent admissions of similar complaints of left knee pain.  Patient complains of severe left knee pain.  Pt demanding iv opioids.  Pt is stating     on admission pt was afebrile, hypertensive, O2 sat 100 on suppl O2  cbc showed no leucocytosis, Hb 8.8  bmp hyperkalemia K 5.5 No EKG changes  elevated BUN/cr   ABG - ( 29 May 2019 00:45 )  pH, Arterial: 7.32  pH, Blood: x     /  pCO2: 51    /  pO2: 223   / HCO3: 26    / Base Excess: -0.3  /  SaO2: 100     CT scan of knee Circumferential infiltration of the subcutaneous soft tissues about the left knee without evidence of a drainable collection. No areas of cortical destruction to suggest osteomyelitis. concern of cellulites  CXR showed congestion ++ (29 May 2019 03:53)      Pain Level:   Scale: VAS    PAST MEDICAL & SURGICAL HISTORY:  Migraine  HTN (hypertension)  DM (diabetes mellitus)  Bipolar disorder  Schizophrenia  ESRD on dialysis  Morbid obesity with BMI of 40.0-44.9, adult  H/O hernia repair      FAMILY HISTORY:  Family history of COPD (chronic obstructive pulmonary disease)  Family history of diabetes mellitus      SOCIAL HISTORY:  [ ] Denies Smoking, Alcohol, or Drug Use    Allergies    aspirin (Short breath)  aspirin (Swelling)  penicillin (Short breath)  penicillins (Swelling)  shellfish (Unknown)    Intolerances        PAIN MEDICATIONS:  QUEtiapine 200 milliGRAM(s) Oral daily      Heme:  clopidogrel Tablet 75 milliGRAM(s) Oral daily  heparin  Injectable 5000 Unit(s) SubCutaneous every 12 hours    Antibiotics:    Cardiovascular:  amLODIPine   Tablet 5 milliGRAM(s) Oral daily  furosemide    Tablet 80 milliGRAM(s) Oral daily  hydrALAZINE 50 milliGRAM(s) Oral three times a day  metoprolol tartrate 25 milliGRAM(s) Oral two times a day    GI:  pantoprazole    Tablet 40 milliGRAM(s) Oral before breakfast    Endocrine:  insulin glargine Injectable (LANTUS) 10 Unit(s) SubCutaneous at bedtime  insulin lispro (HumaLOG) corrective regimen sliding scale   SubCutaneous three times a day before meals  insulin lispro (HumaLOG) corrective regimen sliding scale   SubCutaneous at bedtime  simvastatin 40 milliGRAM(s) Oral at bedtime    All Other Medications:  chlorhexidine 4% Liquid 1 Application(s) Topical <User Schedule>  ferrous    sulfate 325 milliGRAM(s) Oral daily  sevelamer carbonate 2400 milliGRAM(s) Oral three times a day with meals      REVIEW OF SYSTEMS:    CONSTITUTIONAL: No fever, weight loss, or fatigue  RESPIRATORY: No cough, wheezing, chills, or hemoptysis, No SOB  NECK: No neck pain  CARDIOVASCULAR: No chest pain, palpitations, dizziness, or leg swelling  GASTROINTESTINAL: No abdominal or epigastric pain.  No nausea, vomiting, or hematemesis.  No diarrhea. + constipation.  GENITOURINARY: No dysuria, frequency, hematuria or incontinence  NEUROLOGICAL: No headaches, memory loss, loss of strength, numbness or tremors  MUSCULOSKELETAL: No joint pain or swelling, No muscle or back pain    Vital Signs Last 24 Hrs  T(C): 36.7 (29 May 2019 07:25), Max: 37.2 (28 May 2019 21:21)  T(F): 98 (29 May 2019 07:25), Max: 98.9 (28 May 2019 21:21)  HR: 84 (29 May 2019 07:25) (84 - 97)  BP: 160/95 (29 May 2019 07:25) (137/79 - 163/103)  BP(mean): --  RR: 16 (29 May 2019 07:25) (14 - 20)  SpO2: 100% (29 May 2019 07:25) (98% - 100%)    PAIN SCORE:         SCALE USED: (1-10 VNRS)    PHYSICAL EXAM:    GENERAL: NAD, well-groomed, well-developed   NERVOUS SYSTEM: Alert and oriented x3, Motor strength 5/5 B/L upper and lower extremities, SLR -   CHEST/LUNG: Clear to percussion bilaterally, no rale, rhonchi or wheezing  HEART: Regular rate and rhythm, No murmurs, rubs, or gallops   ABDOMEN: Soft, nontender, nondistended, Bowel sounds present  BACK: No CVA tenderness, No paravetebral tenderness  EXTREMITIES: +2 periperal extremities, no edema, cyanosis + decreased rom     LABS:                          8.8    10.08 )-----------( 229      ( 29 May 2019 00:37 )             28.6     05-29    135  |  95<L>  |  62<H>  ----------------------------<  172<H>  4.4   |  29  |  10.50<H>    Ca    6.9<L>      29 May 2019 10:21  Phos  8.2     05-29  Mg     2.0     05-29    TPro  7.5  /  Alb  3.1<L>  /  TBili  0.4  /  DBili  x   /  AST  32  /  ALT  38  /  AlkPhos  110  05-29    PT/INR - ( 29 May 2019 00:37 )   PT: 10.6 sec;   INR: 0.96 ratio         PTT - ( 29 May 2019 00:37 )  PTT:22.4 sec      RADIOLOGY:    Drug Screen:        RECOMMENDATIONS    [ ]  [place]  Reviewed and Copied to Chart Chief Complaint: left knee pain    HPI:  39 Male with PMH of Type 2 DM, Migraine, cocaine abuse last dose week ago, ESRD on HD (left arm graft, M/W/F), Morbid obesity, HTN, Chr dyspnea (uses home O2 prn), Schizophrenia, Bacteremia due to foot cellulitis presented to ED c/o left knee pain for 2 days. Pain describes knee pain as sharp, 10/10 in intensity, non radiating, worse with movement, and unable to ambulate. . Pt reports that yesterday after riding in a car he was unable to get out secondary to severe pain in the L knee. Pt states that his PCP referred him to orthopedist Dr. Velazquez and he has a scheduled appointment on Anya 3. Pt relates an ACL injury when he was a teenager and denies any recent trauma. Pt endorses that his knee feels swollen. Patient also report of difficulty breathing since 1 day, he use his albuterol inhalers multiple times with no significant improvement in his symptoms. Patient missed his HD on Monday because he was unable to go to HD centre due to knee pain. patient denies fever, chills, headaches, chest pain, palpitation, nausea, vomiting, diarrhea, abdominal pain/distension, dysuria, skin rashes.  patient used cocaine last week.    39 year old male with frequent admissions of similar complaints of left knee pain.  Patient complains of severe left knee pain.  Pt demanding iv opioids.  Pt is stating po opioids not effective.  Pt is belligerant and aggresive at times.  I spoke to pt and explained that we would not be given iv opioids at all.  Ortho consult done     on admission pt was afebrile, hypertensive, O2 sat 100 on suppl O2  cbc showed no leucocytosis, Hb 8.8  bmp hyperkalemia K 5.5 No EKG changes  elevated BUN/cr   ABG - ( 29 May 2019 00:45 )  pH, Arterial: 7.32  pH, Blood: x     /  pCO2: 51    /  pO2: 223   / HCO3: 26    / Base Excess: -0.3  /  SaO2: 100     CT scan of knee Circumferential infiltration of the subcutaneous soft tissues about the left knee without evidence of a drainable collection. No areas of cortical destruction to suggest osteomyelitis. concern of cellulites  CXR showed congestion ++ (29 May 2019 03:53)      Pain Level:   Scale: VAS    PAST MEDICAL & SURGICAL HISTORY:  Migraine  HTN (hypertension)  DM (diabetes mellitus)  Bipolar disorder  Schizophrenia  ESRD on dialysis  Morbid obesity with BMI of 40.0-44.9, adult  H/O hernia repair      FAMILY HISTORY:  Family history of COPD (chronic obstructive pulmonary disease)  Family history of diabetes mellitus      SOCIAL HISTORY:  [ ] Denies Smoking, Alcohol, or Drug Use    Allergies    aspirin (Short breath)  aspirin (Swelling)  penicillin (Short breath)  penicillins (Swelling)  shellfish (Unknown)    Intolerances        PAIN MEDICATIONS:  QUEtiapine 200 milliGRAM(s) Oral daily      Heme:  clopidogrel Tablet 75 milliGRAM(s) Oral daily  heparin  Injectable 5000 Unit(s) SubCutaneous every 12 hours    Antibiotics:    Cardiovascular:  amLODIPine   Tablet 5 milliGRAM(s) Oral daily  furosemide    Tablet 80 milliGRAM(s) Oral daily  hydrALAZINE 50 milliGRAM(s) Oral three times a day  metoprolol tartrate 25 milliGRAM(s) Oral two times a day    GI:  pantoprazole    Tablet 40 milliGRAM(s) Oral before breakfast    Endocrine:  insulin glargine Injectable (LANTUS) 10 Unit(s) SubCutaneous at bedtime  insulin lispro (HumaLOG) corrective regimen sliding scale   SubCutaneous three times a day before meals  insulin lispro (HumaLOG) corrective regimen sliding scale   SubCutaneous at bedtime  simvastatin 40 milliGRAM(s) Oral at bedtime    All Other Medications:  chlorhexidine 4% Liquid 1 Application(s) Topical <User Schedule>  ferrous    sulfate 325 milliGRAM(s) Oral daily  sevelamer carbonate 2400 milliGRAM(s) Oral three times a day with meals      REVIEW OF SYSTEMS:    CONSTITUTIONAL: No fever, weight loss, or fatigue  RESPIRATORY: No cough, wheezing, chills, or hemoptysis, No SOB  NECK: No neck pain  CARDIOVASCULAR: No chest pain, palpitations, dizziness, or leg swelling  GASTROINTESTINAL: No abdominal or epigastric pain.  No nausea, vomiting, or hematemesis.  No diarrhea. + constipation.  GENITOURINARY: No dysuria, frequency, hematuria or incontinence  NEUROLOGICAL: No headaches, memory loss, loss of strength, numbness or tremors  MUSCULOSKELETAL: No joint pain or swelling, No muscle or back pain    Vital Signs Last 24 Hrs  T(C): 36.7 (29 May 2019 07:25), Max: 37.2 (28 May 2019 21:21)  T(F): 98 (29 May 2019 07:25), Max: 98.9 (28 May 2019 21:21)  HR: 84 (29 May 2019 07:25) (84 - 97)  BP: 160/95 (29 May 2019 07:25) (137/79 - 163/103)  BP(mean): --  RR: 16 (29 May 2019 07:25) (14 - 20)  SpO2: 100% (29 May 2019 07:25) (98% - 100%)    PAIN SCORE:         SCALE USED: (1-10 VNRS)    PHYSICAL EXAM:    GENERAL: NAD, well-groomed, well-developed   NERVOUS SYSTEM: Alert and oriented x3, Motor strength 5/5 B/L upper and lower extremities, SLR -   CHEST/LUNG: Clear to percussion bilaterally, no rale, rhonchi or wheezing  HEART: Regular rate and rhythm, No murmurs, rubs, or gallops   ABDOMEN: Soft, nontender, nondistended, Bowel sounds present  BACK: No CVA tenderness, No paravetebral tenderness  EXTREMITIES: +2 periperal extremities, no edema, cyanosis + decreased rom     LABS:                          8.8    10.08 )-----------( 229      ( 29 May 2019 00:37 )             28.6     05-29    135  |  95<L>  |  62<H>  ----------------------------<  172<H>  4.4   |  29  |  10.50<H>    Ca    6.9<L>      29 May 2019 10:21  Phos  8.2     05-29  Mg     2.0     05-29    TPro  7.5  /  Alb  3.1<L>  /  TBili  0.4  /  DBili  x   /  AST  32  /  ALT  38  /  AlkPhos  110  05-29    PT/INR - ( 29 May 2019 00:37 )   PT: 10.6 sec;   INR: 0.96 ratio         PTT - ( 29 May 2019 00:37 )  PTT:22.4 sec      RADIOLOGY:    Drug Screen:        RECOMMENDATIONS    [ ]  [place]  Reviewed and Copied to Chart

## 2019-05-29 NOTE — DISCHARGE NOTE PROVIDER - CARE PROVIDERS DIRECT ADDRESSES
[email or web address]

## 2019-05-29 NOTE — CONSULT NOTE ADULT - ASSESSMENT
#ESRD. s/p emergent HD for volume overload. still profoundly volume overloaded.  ongoing non-compliance with HD schedule. compliance re-inforced  HD tommorrow with UF as tolerated  # anemia of CKD. procrit on HD  # hyperkalemia: resolved with HD  #HTN cont norvasc, hydralazine and metoprolol  # LT knee pain. s/p a dose of vancomycin. CT results noted  renal osteodystrophy. phos very high sec to non-compliance with binders. cont sevelamer.    Many thanks for the consult
Left leg Pain - no evidence of cellulitis or infection at this time, of note this patient is know to abuse pain meds and is also coming in for pain meds.    plan - no antibiotics at this time  dc planning  reconsult prn.

## 2019-05-29 NOTE — PATIENT PROFILE ADULT - HAS THE PATIENT USED TOBACCO IN THE PAST 30 DAYS?
[FreeTextEntry1] : check labs\par see derm for skin rash\par avoid pregnancy use condoms\par should see gyn\par medically fit otherwise
Patient declined to answer

## 2019-05-29 NOTE — H&P ADULT - ATTENDING COMMENTS
seen and examined  comfortable on bed not in nay distress  c/o lt knee pain    no sob( s/p HD)  denies any abd pain , no nausea , vomiting , no heart burns,  no rectal bleed,  vsstable afebrile physical unchanged   lt knee  wrapped in ACE bandage. surrounding area  not hot  leg and upper thighs normal exam.   pt had xray of knee  showed soft tissue swelling  also ct knee showed cellulitis  if clinically warrented  as per ID no cellulitis.  afebrile  wbc nml   for pain , Pt asking for dilaudid IV  I called pain management , iv tylenol prn  po tylenol 3  d/w nephro   Pt can be d/scharged  f/u pcp, ortho,   GI for anemia  already gets epogen  esrd on hd cont same   cocain abuse  advised not to do

## 2019-05-29 NOTE — H&P ADULT - PROBLEM SELECTOR PROBLEM 7
Chest x-ray notable for ill-defined bilateral lower lung interstitial opacities, concerning for pneumonia. Initially met sepsis criteria, now resolved.  D/C from [facility] 3 days ago. Chest x-ray indicates possible underlying history of pulmonary fibrosis.    Morbid obesity with BMI of 40.0-44.9, adult

## 2019-05-29 NOTE — CONSULT NOTE ADULT - MUSCULOSKELETAL
details… detailed exam Left knee pain no joint swelling/no joint erythema/no joint warmth/Left knee pain

## 2019-05-29 NOTE — CONSULT NOTE ADULT - ATTENDING COMMENTS
The patient has sudden onset knee pain, diffuse pain throughout the knee. no hx of infection.  no WBC  no fever    edema and erythema of the left calf and foot  tenderness diffusely about the knee  no palp effusion  limited exam secondary guarding and body habitus  mild pain to mild knee passive motion    CT scan  no fx, no effusion    bedside ultrasound exam no effusion    AP  Left knee pain    low suspicion for infection given labs and vitals and lack of effusion    unknown source of pain  recommend WB xr to assess for arthritis  recommend MRI, can be done as outpatient    FU as outpatient
pt seen and  examined with ID resident

## 2019-05-29 NOTE — CONSULT NOTE ADULT - GASTROINTESTINAL DETAILS
no rigidity/no guarding/soft/normal/nontender/no distention no rigidity/no distention/no organomegaly/normal/no guarding/soft/no masses palpable/nontender/bowel sounds normal

## 2019-05-29 NOTE — H&P ADULT - HISTORY OF PRESENT ILLNESS
39 Male with PMH of Type 2 DM, Migraine, cocaine abuse, ESRD on HD (left arm graft, M/W/F), Morbid obesity, HTN, Chr dyspnea (uses home O2 prn), Schizophrenia, Bacteremia due to foot cellulitis presented to ED c/o left leg pain and swelling since yesterday afternoon. 39 Male with PMH of Type 2 DM, Migraine, cocaine abuse last dose week ago, ESRD on HD (left arm graft, M/W/F), Morbid obesity, HTN, Chr dyspnea (uses home O2 prn), Schizophrenia, Bacteremia due to foot cellulitis presented to ED c/o left knee pain for 2 days. Pain describes knee pain as sharp, 10/10 in intensity, non radiating, worse with movement, and unable to ambulate. . Pt reports that yesterday after riding in a car he was unable to get out secondary to severe pain in the L knee. Pt states that his PCP referred him to orthopedist Dr. Velazquez and he has a scheduled appointment on Anya 3. Pt relates an ACL injury when he was a teenager and denies any recent trauma. Pt endorses that his knee feels swollen. Patient also report of difficulty breathing since 1 day, he use his albuterol inhalers multiple times with no significant improvement in his symptoms. Patient missed his HD on Monday because he was unable to go to HD centre due to knee pain. patient denies fever, chills, headaches, chest pain, palpitation, nausea, vomiting, diarrhea, abdominal pain/distension, dysuria, skin rashes.  patient used cocaine last week    on admission pt was afebrile, hypertensive, O2 sat 100 on suppl O2  cbc showed no leucocytosis, Hb 8.8  bmp hyperkalemia K 5.5 No EKG changes  elevated BUN/cr   ABG - ( 29 May 2019 00:45 )  pH, Arterial: 7.32  pH, Blood: x     /  pCO2: 51    /  pO2: 223   / HCO3: 26    / Base Excess: -0.3  /  SaO2: 100     CT scan of knee Circumferential infiltration of the subcutaneous soft tissues about the left knee without evidence of a drainable collection. No areas of cortical destruction to suggest osteomyelitis. concern of cellulites  CXR showed congestion ++

## 2019-05-29 NOTE — DISCHARGE NOTE PROVIDER - HOSPITAL COURSE
39 Male with PMH of Type 2 DM, Migraine, cocaine abuse last dose week ago, ESRD on HD (left arm graft, M/W/F), Morbid obesity, HTN, Chr dyspnea (uses home O2 prn), Schizophrenia, Bacteremia due to foot cellulitis presented to ED c/o left knee pain for 2 days. Pain describes knee pain as sharp, 10/10 in intensity, non radiating, worse with movement, and unable to ambulate.  Pt reports that yesterday after riding in a car he was unable to get out secondary to severe pain in the L knee. Pt states that his PCP referred him to orthopedist Dr. Velazquez and he has a scheduled appointment on Anya 3. Pt relates an ACL injury when he was a teenager and denies any recent trauma. Pt endorses that his knee feels swollen. Patient also report of difficulty breathing since 1 day, he use his albuterol inhalers multiple times with no significant improvement in his symptoms. Patient missed his HD on Monday because he was unable to go to HD center due to knee pain. patient denies fever, chills, headaches, chest pain, palpitation, nausea, vomiting, diarrhea, abdominal pain/distension, dysuria, skin rashes. Patient used cocaine last week. Evaluated pt in ED holding for cellulitis of Lt knee due to  CT scan of knee showed circumferential infiltration of the subcutaneous soft tissues about the left knee without evidence of a drainable collection. No areas of cortical destruction to suggest osteomyelitis. concern of cellulites.  So, pt was evaluated by Orthopedic - recommended doppler to r/o DVT -     Seen by ID, no ABT needed at this time, recommended ortho f/u as outpt as he already has an appointment with Dr. Velazquez. 39 Male with PMH of Type 2 DM, Migraine, cocaine abuse last dose week ago, ESRD on HD (left arm graft, M/W/F), Morbid obesity, HTN, Chr dyspnea (uses home O2 prn), Schizophrenia, Bacteremia due to foot cellulitis presented to ED c/o left knee pain for 2 days. Pain describes knee pain as sharp, 10/10 in intensity, non radiating, worse with movement, and unable to ambulate.  Pt reports that yesterday after riding in a car he was unable to get out secondary to severe pain in the L knee. Pt states that his PCP referred him to orthopedist Dr. Velazquez and he has a scheduled appointment on Anya 3. Pt relates an ACL injury when he was a teenager and denies any recent trauma. Pt endorses that his knee feels swollen. Patient also report of difficulty breathing since 1 day, he use his albuterol inhalers multiple times with no significant improvement in his symptoms. Patient missed his HD on Monday because he was unable to go to HD center due to knee pain. patient denies fever, chills, headaches, chest pain, palpitation, nausea, vomiting, diarrhea, abdominal pain/distension, dysuria, skin rashes. Patient used cocaine last week. Evaluated pt in ED holding for cellulitis of Lt knee due to  CT scan of knee showed circumferential infiltration of the subcutaneous soft tissues about the left knee without evidence of a drainable collection. No areas of cortical destruction to suggest osteomyelitis. concern of cellulites.  So, pt was evaluated by Orthopedic - recommended doppler to r/o DVT -  NO DVT.     Seen by ID, no ABT needed at this time, recommended ortho f/u as outpt as he already has an appointment with Dr. Velazquez.  Spoke with Dr. La, if he discharge today he can have HD on Friday 39 Male with PMH of Type 2 DM, Migraine, cocaine abuse last dose week ago, ESRD on HD (left arm graft, M/W/F), Morbid obesity, HTN, Chr dyspnea (uses home O2 prn), Schizophrenia, Bacteremia due to foot cellulitis presented to ED c/o left knee pain for 2 days. Pain describes knee pain as sharp, 10/10 in intensity, non radiating, worse with movement, and unable to ambulate.  Pt reports that yesterday after riding in a car he was unable to get out secondary to severe pain in the L knee. Pt states that his PCP referred him to orthopedist Dr. Velazquez and he has a scheduled appointment on Anya 3. Pt relates an ACL injury when he was a teenager and denies any recent trauma. Pt endorses that his knee feels swollen. Patient also report of difficulty breathing since 1 day, he use his albuterol inhalers multiple times with no significant improvement in his symptoms. Patient missed his HD on Monday because he was unable to go to HD center due to knee pain. patient denies fever, chills, headaches, chest pain, palpitation, nausea, vomiting, diarrhea, abdominal pain/distension, dysuria, skin rashes. Patient used cocaine last week. Evaluated pt in ED holding for cellulitis of Lt knee due to  CT scan of knee showed circumferential infiltration of the subcutaneous soft tissues about the left knee without evidence of a drainable collection. No areas of cortical destruction to suggest osteomyelitis. concern of cellulites.  So, pt was evaluated by Orthopedic - recommended doppler to r/o DVT -  NO DVT.  He refused to walk in ED holding due to pain, he requested pain meds before trying him to walk then he got a dose of dilaudid and went to sleep.     Seen by ID, no ABT needed at this time, recommended ortho f/u as outpt as he already has an appointment with Dr. Velazquez.  Spoke with Dr. La, if he discharge today he can have HD on Friday 39 Male with PMH of Type 2 DM, Migraine, cocaine abuse last dose week ago, ESRD on HD (left arm graft, M/W/F), Morbid obesity, HTN, Chr dyspnea (uses home O2 prn), Schizophrenia, Bacteremia due to foot cellulitis presented to ED c/o left knee pain for 2 days. Pain describes knee pain as sharp, 10/10 in intensity, non radiating, worse with movement, and unable to ambulate.  Pt reports that yesterday after riding in a car he was unable to get out secondary to severe pain in the L knee. Pt states that his PCP referred him to orthopedist Dr. Velazquez and he has a scheduled appointment on Anya 3. Pt relates an ACL injury when he was a teenager and denies any recent trauma. Pt endorses that his knee feels swollen. Patient also report of difficulty breathing since 1 day, he use his albuterol inhalers multiple times with no significant improvement in his symptoms. Patient missed his HD on Monday because he was unable to go to HD center due to knee pain. patient denies fever, chills, headaches, chest pain, palpitation, nausea, vomiting, diarrhea, abdominal pain/distension, dysuria, skin rashes. Patient used cocaine last week. Evaluated pt in ED holding for cellulitis of Lt knee due to  CT scan of knee showed circumferential infiltration of the subcutaneous soft tissues about the left knee without evidence of a drainable collection. No areas of cortical destruction to suggest osteomyelitis. concern of cellulites.  So, pt was evaluated by Orthopedic - recommended doppler to r/o DVT -  NO DVT.  He refused to walk in ED holding due to pain, he requested pain meds before trying him to walk then he got a dose of dilaudid and went to sleep.     Seen by ID, no ABT needed at this time, recommended ortho f/u as outpt as he already has an appointment with Dr. Velazquez    5/30 c/o left knee pain, MRI done HD today 39 Male with PMH of Type 2 DM, Migraine, cocaine abuse last dose week ago, ESRD on HD (left arm graft, M/W/F), Morbid obesity, HTN, Chr dyspnea (uses home O2 prn), Schizophrenia, Bacteremia due to foot cellulitis presented to ED c/o left knee pain for 2 days. Pain describes knee pain as sharp, 10/10 in intensity, non radiating, worse with movement, and unable to ambulate.  Pt reports that yesterday after riding in a car he was unable to get out secondary to severe pain in the L knee. Pt states that his PCP referred him to orthopedist Dr. Velazquez and he has a scheduled appointment on Anya 3. Pt relates an ACL injury when he was a teenager and denies any recent trauma. Pt endorses that his knee feels swollen. Patient also report of difficulty breathing since 1 day, he use his albuterol inhalers multiple times with no significant improvement in his symptoms. Patient missed his HD on Monday because he was unable to go to HD center due to knee pain. patient denies fever, chills, headaches, chest pain, palpitation, nausea, vomiting, diarrhea, abdominal pain/distension, dysuria, skin rashes. Patient used cocaine last week. Evaluated pt in ED holding for cellulitis of Lt knee due to  CT scan of knee showed circumferential infiltration of the subcutaneous soft tissues about the left knee without evidence of a drainable collection. No areas of cortical destruction to suggest osteomyelitis. concern of cellulites.  So, pt was evaluated by Orthopedic - recommended doppler to r/o DVT -  NO DVT.  He refused to walk in ED holding due to pain, he requested pain meds before trying him to walk then he got a dose of dilaudid and went to sleep.     Seen by ID, no ABT needed at this time, recommended ortho f/u as outpt as he already has an appointment with Dr. Velazquez    5/30 c/o left knee pain, MRI done HD today . Pt requesting to stop MRI. MRI was not completed.    Pt should f/u with Dr Velazquez as scheduled. 39 Male with PMH of Type 2 DM, Migraine, cocaine abuse last dose week ago, ESRD on HD (left arm graft, M/W/F), Morbid obesity, HTN, Chr dyspnea (uses home O2 prn), Schizophrenia, Bacteremia due to foot cellulitis presented to ED c/o left knee pain for 2 days. Pain describes knee pain as sharp, 10/10 in intensity, non radiating, worse with movement, and unable to ambulate.  Pt reports that yesterday after riding in a car he was unable to get out secondary to severe pain in the L knee. Pt states that his PCP referred him to orthopedist Dr. Velazquez and he has a scheduled appointment on Anya 3. Pt relates an ACL injury when he was a teenager and denies any recent trauma. Pt endorses that his knee feels swollen. Patient also report of difficulty breathing since 1 day, he use his albuterol inhalers multiple times with no significant improvement in his symptoms. Patient missed his HD on Monday because he was unable to go to HD center due to knee pain. patient denies fever, chills, headaches, chest pain, palpitation, nausea, vomiting, diarrhea, abdominal pain/distension, dysuria, skin rashes. Patient used cocaine last week. Evaluated pt in ED holding for cellulitis of Lt knee due to  CT scan of knee showed circumferential infiltration of the subcutaneous soft tissues about the left knee without evidence of a drainable collection. No areas of cortical destruction to suggest osteomyelitis. concern of cellulites.  So, pt was evaluated by Orthopedic - recommended doppler to r/o DVT -  NO DVT.  He refused to walk in ED holding due to pain, he requested pain meds before trying him to walk then he got a dose of dilaudid and went to sleep.     Seen by ID, no ABT needed at this time, recommended ortho f/u as outpt as he already has an appointment with Dr. Velazquez    5/30 c/o left knee pain, MRI done HD today . Pt requesting to stop MRI. MRI was not completed.    Pt should f/u with Dr Velazquez as scheduled on 6/3.    Pt was evaluated by PT . Oxygen at rest on RA 96 . With ambulation after 2 steps O2 on RA was 86 %. 4 liters were placed while ambulating and oxygen saturation increased to 93%.    Pt has fluid Volume Overload secondary to ESRD 39 Male with PMH of Type 2 DM, Asthma , COPD,  Migraine, cocaine abuse last dose week ago, ESRD on HD (left arm graft, M/W/F), Morbid obesity, HTN, Chr dyspnea (uses home O2 prn), Schizophrenia, Bacteremia due to foot cellulitis presented to ED c/o left knee pain for 2 days. Pain describes knee pain as sharp, 10/10 in intensity, non radiating, worse with movement, and unable to ambulate.  Pt reports that yesterday after riding in a car he was unable to get out secondary to severe pain in the L knee. Pt states that his PCP referred him to orthopedist Dr. Velazquez and he has a scheduled appointment on Anya 3. Pt relates an ACL injury when he was a teenager and denies any recent trauma. Pt endorses that his knee feels swollen. Patient also report of difficulty breathing since 1 day, he use his albuterol inhalers multiple times with no significant improvement in his symptoms. Patient missed his HD on Monday because he was unable to go to HD center due to knee pain. patient denies fever, chills, headaches, chest pain, palpitation, nausea, vomiting, diarrhea, abdominal pain/distension, dysuria, skin rashes. Patient used cocaine last week. Evaluated pt in ED holding for cellulitis of Lt knee due to  CT scan of knee showed circumferential infiltration of the subcutaneous soft tissues about the left knee without evidence of a drainable collection. No areas of cortical destruction to suggest osteomyelitis. concern of cellulites.  So, pt was evaluated by Orthopedic - recommended doppler to r/o DVT -  NO DVT.  He refused to walk in ED holding due to pain, he requested pain meds before trying him to walk then he got a dose of dilaudid and went to sleep.     Seen by ID, no ABT needed at this time, recommended ortho f/u as outpt as he already has an appointment with Dr. Velazquez    5/30 c/o left knee pain, MRI done HD today . Pt requesting to stop MRI. MRI was not completed.    Pt should f/u with Dr Velazquez as scheduled on 6/3.    Pt was evaluated by PT . Oxygen at rest on RA 96 . With ambulation after 2 steps O2 on RA was 86 %. 4 liters were placed while ambulating and oxygen saturation increased to 93%.    Pt has fluid Volume Overload secondary to ESRD, Asthma and COPD. 39 Male with PMH of Type 2 DM, Asthma , COPD,  Migraine, cocaine abuse last dose week ago, ESRD on HD (left arm graft, M/W/F), Morbid obesity, HTN, Chr dyspnea (uses home O2 prn), Schizophrenia, Bacteremia due to foot cellulitis presented to ED c/o left knee pain for 2 days. Pain describes knee pain as sharp, 10/10 in intensity, non radiating, worse with movement, and unable to ambulate.  Pt reports that yesterday after riding in a car he was unable to get out secondary to severe pain in the L knee. Pt states that his PCP referred him to orthopedist Dr. Velazquez and he has a scheduled appointment on Anya 3. Pt relates an ACL injury when he was a teenager and denies any recent trauma. Pt endorses that his knee feels swollen. Patient also report of difficulty breathing since 1 day, he use his albuterol inhalers multiple times with no significant improvement in his symptoms. Patient missed his HD on Monday because he was unable to go to HD center due to knee pain. patient denies fever, chills, headaches, chest pain, palpitation, nausea, vomiting, diarrhea, abdominal pain/distension, dysuria, skin rashes. Patient used cocaine last week. Evaluated pt in ED holding for cellulitis of Lt knee due to  CT scan of knee showed circumferential infiltration of the subcutaneous soft tissues about the left knee without evidence of a drainable collection. No areas of cortical destruction to suggest osteomyelitis. concern of cellulites.  So, pt was evaluated by Orthopedic - recommended doppler to r/o DVT -  NO DVT.  He refused to walk in ED holding due to pain, he requested pain meds before trying him to walk then he got a dose of dilaudid and went to sleep.     Seen by ID, no ABT needed at this time, recommended ortho f/u as outpt as he already has an appointment with Dr. Velazquez    5/30 c/o left knee pain, MRI done HD today . Pt requesting to stop MRI. MRI was not completed.    Pt should f/u with Dr Velazquez as scheduled on 6/3.    Pt was evaluated by PT . Oxygen at rest on RA 96 . With ambulation after 2 steps O2 on RA was 86 %. 4 liters were placed while ambulating and oxygen saturation increased to 93%. Pt is hypoxic with ambulation on RA.    Pt has fluid Volume Overload secondary to ESRD, Asthma and COPD. 39 Male with PMH of Type 2 DM, Asthma , COPD,  Migraine, cocaine abuse last dose week ago, ESRD on HD (left arm graft, M/W/F), Morbid obesity, HTN, Chr dyspnea (uses home O2 prn), Schizophrenia, Bacteremia due to foot cellulitis presented to ED c/o left knee pain for 2 days. Pain describes knee pain as sharp, 10/10 in intensity, non radiating, worse with movement, and unable to ambulate.  Pt reports that yesterday after riding in a car he was unable to get out secondary to severe pain in the L knee. Pt states that his PCP referred him to orthopedist Dr. Velazquez and he has a scheduled appointment on Anya 3. Pt relates an ACL injury when he was a teenager and denies any recent trauma. Pt endorses that his knee feels swollen. Patient also report of difficulty breathing since 1 day, he use his albuterol inhalers multiple times with no significant improvement in his symptoms. Patient missed his HD on Monday because he was unable to go to HD center due to knee pain. patient denies fever, chills, headaches, chest pain, palpitation, nausea, vomiting, diarrhea, abdominal pain/distension, dysuria, skin rashes. Patient used cocaine last week. Evaluated pt in ED holding for cellulitis of Lt knee due to  CT scan of knee showed circumferential infiltration of the subcutaneous soft tissues about the left knee without evidence of a drainable collection. No areas of cortical destruction to suggest osteomyelitis. concern of cellulites.  So, pt was evaluated by Orthopedic - recommended doppler to r/o DVT -  NO DVT.  He refused to walk in ED holding due to pain, he requested pain meds before trying him to walk then he got a dose of dilaudid and went to sleep.     Seen by ID, no ABT needed at this time, recommended ortho f/u as outpt as he already has an appointment with Dr. Velazquez    5/30 c/o left knee pain, HD today. MRI was in progress the pt requested to stop MRI. MRI was not completed.    Pt was evaluated by PT . Oxygen at rest on RA 96 . With ambulation after 2 steps O2 on RA was 86 %. 4 liters were placed while ambulating and oxygen saturation increased to 93%. Pt is hypoxic with ambulation on RA.    Pt has fluid Volume Overload secondary to ESRD, Asthma and COPD.        Assessment / DC plan    Knee pain - no further complaints    f/u with Dr Velazquez outpatient        Hypoxia    dc with home O2    need to follow up with his pulmonologist        ESRD on RRT MWF    had HD yesterday, plan to resume HD at his kidney ctr tmrw 6/6/19    resume dialysis at his regular HD center        Substance abuse disorder    cocaine cessation recommended- he's not amendable

## 2019-05-29 NOTE — CONSULT NOTE ADULT - SUBJECTIVE AND OBJECTIVE BOX
Pt Name: PRESTON JOHNSON  MRN: [id number]    ORTHOPEDIC CONSULT    Orthopedic diagnosis:    39yMaleHPI:  39 Male with PMH of Type 2 DM, Migraine, cocaine abuse last dose week ago, ESRD on HD (left arm graft, M/W/F), Morbid obesity, HTN, Chr dyspnea (uses home O2 prn), Schizophrenia, Bacteremia due to foot cellulitis presented to ED c/o left knee pain for 2 days. Pain describes knee pain as sharp, 10/10 in intensity, non radiating, worse with movement, and unable to ambulate. . Pt reports that yesterday after riding in a car he was unable to get out secondary to severe pain in the L knee. Pt states that his PCP referred him to orthopedist Dr. Velazquez and he has a scheduled appointment on Anya 3. Pt relates an ACL injury when he was a teenager and denies any recent trauma. Pt endorses that his knee feels swollen. Patient also report of difficulty breathing since 1 day, he use his albuterol inhalers multiple times with no significant improvement in his symptoms. Patient missed his HD on Monday because he was unable to go to HD centre due to knee pain. patient denies fever, chills, headaches, chest pain, palpitation, nausea, vomiting, diarrhea, abdominal pain/distension, dysuria, skin rashes.    Ortho called to evaluate morbidly obese male, with h/o left knee chronic ACL tear injury, approximately 20 years ago from a football injury on high school. Pt reports he hasnt had it surgically treated. He continues to have intermittent left knee pain, with associated buckling and instability of the affected knee with ambulation. This episode of left knee today, initially started about 3 days ago, denies any trauma to the left knee where the pt states he has decreased ROM, difficulty ambulating. h/o cellulitis of the left foot which appears to be traveling more proximally to the proximal aspect of the left calf.  Pt denies Chest pain, SOB, dyspnea, paresthesias, N/V/D, abdominal pain, syncope, or pain anywhere else.     AMBULATION: Baseline Ambulation [ ] independent [ ] With Cane [ ] With Walker [ ]  Bedbound [ ] Pivot transfers to Wheelchair only    PAST MEDICAL & SURGICAL HISTORY:  Migraine  HTN (hypertension)  DM (diabetes mellitus)  Bipolar disorder  Schizophrenia  ESRD on dialysis  Morbid obesity with BMI of 40.0-44.9, adult  H/O hernia repair      ALLERGIES: aspirin (Short breath)  aspirin (Swelling)  penicillin (Short breath)  penicillins (Swelling)  shellfish (Unknown)      MEDICATIONS: ALBUTerol    90 MICROgram(s) HFA Inhaler 1 Puff(s) Inhalation every 4 hours  ALBUTerol/ipratropium for Nebulization 3 milliLiter(s) Nebulizer every 6 hours  amLODIPine   Tablet 5 milliGRAM(s) Oral daily  chlorhexidine 4% Liquid 1 Application(s) Topical <User Schedule>  clopidogrel Tablet 75 milliGRAM(s) Oral daily  ferrous    sulfate 325 milliGRAM(s) Oral daily  furosemide    Tablet 80 milliGRAM(s) Oral daily  heparin  Injectable 5000 Unit(s) SubCutaneous every 12 hours  hydrALAZINE 50 milliGRAM(s) Oral three times a day  insulin glargine Injectable (LANTUS) 10 Unit(s) SubCutaneous at bedtime  insulin lispro (HumaLOG) corrective regimen sliding scale   SubCutaneous three times a day before meals  insulin lispro (HumaLOG) corrective regimen sliding scale   SubCutaneous at bedtime  metoprolol tartrate 25 milliGRAM(s) Oral two times a day  pantoprazole    Tablet 40 milliGRAM(s) Oral before breakfast  QUEtiapine 200 milliGRAM(s) Oral daily  sevelamer carbonate 2400 milliGRAM(s) Oral three times a day with meals  simvastatin 40 milliGRAM(s) Oral at bedtime  tiotropium 18 MICROgram(s) Capsule 1 Capsule(s) Inhalation daily      PHYSICAL EXAM:    Vital Signs Last 24 Hrs  T(C): 36.7 (29 May 2019 07:25), Max: 37.2 (28 May 2019 21:21)  T(F): 98 (29 May 2019 07:25), Max: 98.9 (28 May 2019 21:21)  HR: 84 (29 May 2019 07:25) (84 - 97)  BP: 160/95 (29 May 2019 07:25) (137/79 - 163/103)  BP(mean): --  RR: 16 (29 May 2019 07:25) (14 - 20)  SpO2: 100% (29 May 2019 07:25) (98% - 100%)    Gen: well developed, well nourished, comfortable  MSK:  Left knee  Skin pink, warm over the left knee  mild erythema noted over the LLE/calf  . No open lesions.  mild effusion noted  decreased ROM of left knee 2/2 pain  pt unable to SLR due to left knee pain  Left calf tendernness  calves soft  DP/PT 2+, brisk b/l  nvi silt from mid calf and proximal  distal to midcalf, pt with decreased sensation  flex toes intact  pt unable to extend the toes    LABS:                        8.8    10.08 )-----------( 229      ( 29 May 2019 00:37 )             28.6     05-29    135  |  95<L>  |  62<H>  ----------------------------<  172<H>  4.4   |  29  |  10.50<H>    Ca    6.9<L>      29 May 2019 10:21  Phos  8.2     05-29  Mg     2.0     05-29    TPro  7.5  /  Alb  3.1<L>  /  TBili  0.4  /  DBili  x   /  AST  32  /  ALT  38  /  AlkPhos  110  05-29    PT/INR - ( 29 May 2019 00:37 )   PT: 10.6 sec;   INR: 0.96 ratio         PTT - ( 29 May 2019 00:37 )  PTT:22.4 sec    RADIOLOGY:   < from: CT Knee No Cont, Left (05.29.19 @ 01:30) >    EXAM:  CT KNEE ONLY LT                            PROCEDURE DATE:  05/29/2019          INTERPRETATION:  CLINICAL INDICATION: Left knee pain    TECHNIQUE: Axial CT images were obtained of the left knee without  administration of intravenous contrast. Coronal and sagittal images were   reconstructed. Three   dimensional reformatted images were obtained on an independent   workstation.     COMPARISON: CT of the left lower extremity dated 5/13/2019    FINDINGS:   There is no acute fracture or dislocation. No areas of cortical   destruction to suggest osteomyelitis.    There is no significant joint space narrowing. Again noted is   infiltration of the subcutaneous soft tissues without evidence of a   drainable collection.      IMPRESSION:  No acute fracture or dislocation.     Circumferential infiltration of the subcutaneous soft tissues about the   left knee without evidence of a drainable collection. No areas of   cortical destruction to suggest osteomyelitis. Findings may represent   cellulitis in the correct clinical setting.                NEREYDA JACKSON M.D., ATTENDING RADIOLOGIST  This document has been electronically signed. May 29 2019  2:09AM                < end of copied text >      IMPRESSION: Pt is a  39y Male with __    PLAN:  -  Recommendation: [ X ] Conservative treatment   [  ] Surgical treatment/fixation    > low suspicion for septic knee joint as there is very mild effusion noted. CT findings/nor physical do not concern for septic joint neither.     > Plan as per medical team for treatment of cellulitis. c/w antibiotics    > Duplex venous lower ext ultrasound ordered to r/o dvt  -  All the patient's questions were answered. Pt was explained the Plan, mentioned above, thoroughly.  Risks/benefits, complications were also explained, which includes but not limited to: persistent, infection, death, PNA, thrombombolism, etc. Pt understands.   -  Pain control  -  DVT prophylaxis  -  Daily PT  -  Case d/w __  -  Pt is orthopedically stable for discharge.   -  Pt is to follow up with __ after discharge at: #

## 2019-05-29 NOTE — DISCHARGE NOTE PROVIDER - NSDCCPCAREPLAN_GEN_ALL_CORE_FT
PRINCIPAL DISCHARGE DIAGNOSIS  Diagnosis: Cellulitis of knee, left  Assessment and Plan of Treatment: No medical intervention needed at this time  your blood work was not showing any infection status  f/u with Orthopedic doctor for Lt knee pain      SECONDARY DISCHARGE DIAGNOSES  Diagnosis: ESRD needing dialysis  Assessment and Plan of Treatment[facility] PRINCIPAL DISCHARGE DIAGNOSIS  Diagnosis: Cellulitis of knee, left  Assessment and Plan of Treatment: No medical intervention needed at this time  your blood work was not showing any infection status  f/u with Orthopedic doctor for Lt knee pain. Your appointment is 6/3/19      SECONDARY DISCHARGE DIAGNOSES  Diagnosis: ESRD needing dialysis  Assessment and Plan of Treatment: c/w HD as scheduled [facility]    Diagnosis: Morbid obesity with BMI of 40.0-44.9, adult  Assessment and Plan of Treatment: you  must follow up with Dr Thurston for sleep study and qualification for oxygen

## 2019-05-29 NOTE — H&P ADULT - PROBLEM SELECTOR PLAN 1
patient presented with sob and knee pain  on admission pt was afebrile, hypertensive, O2 sat 100 on suppl O2  cbc showed no leucocytosis, Hb 8.8  bmp hyperkalemia K 5.5 No EKG changes  elevated BUN/cr   ABG - ( 29 May 2019 00:45 )  pH, Arterial: 7.32  pH, Blood: x     /  pCO2: 51    /  pO2: 223   / HCO3: 26    / Base Excess: -0.3  /  SaO2: 100  CXR showed congestion ++   respiratory failure 2/2 pulmonary edema 2/2 vol overload 2/2 missed HD  s/p lasix in Ed   continue with non breather mask   HD in am   Duoneb for now

## 2019-05-29 NOTE — H&P ADULT - ASSESSMENT
39 Male with PMH of Type 2 DM, Migraine, cocaine abuse last dose week ago, ESRD on HD (left arm graft, M/W/F), Morbid obesity, HTN, Chr dyspnea (uses home O2 prn), Schizophrenia, Bacteremia due to foot cellulitis presented to ED c/o left knee pain for 2 days. Pain describes knee pain as sharp, 10/10 in intensity, non radiating, worse with movement, and unable to ambulate. . Pt reports that yesterday after riding in a car he was unable to get out secondary to severe pain in the L knee. Pt states that his PCP referred him to orthopedist Dr. Velazquez and he has a scheduled appointment on Anya 3. Pt relates an ACL injury when he was a teenager and denies any recent trauma. Pt endorses that his knee feels swollen. Patient also report of difficulty breathing since 1 day, he use his albuterol inhalers multiple times with no significant improvement in his symptoms. Patient missed his HD on Monday because he was unable to go to HD centre due to knee pain. patient denies fever, chills, headaches, chest pain, palpitation, nausea, vomiting, diarrhea, abdominal pain/distension, dysuria, skin rashes.  patient used cocaine last week    on admission pt was afebrile, hypertensive, O2 sat 100 on suppl O2  cbc showed no leucocytosis, Hb 8.8  bmp hyperkalemia K 5.5 No EKG changes  elevated BUN/cr   ABG - ( 29 May 2019 00:45 )  pH, Arterial: 7.32  pH, Blood: x     /  pCO2: 51    /  pO2: 223   / HCO3: 26    / Base Excess: -0.3  /  SaO2: 100     CT scan of knee Circumferential infiltration of the subcutaneous soft tissues about the left knee without evidence of a drainable collection. No areas of cortical destruction to suggest osteomyelitis. concern of cellulites  CXR showed congestion ++

## 2019-05-29 NOTE — DISCHARGE NOTE PROVIDER - CARE PROVIDER_API CALL
Donavan Velazquez)  Surgery  Orthopedic  [address], [address] [place] [address]  Phone: [phone]  Fax: [phone]  Follow Up Time: Donavan Velazquez)  Surgery  Orthopedic  [address] [facility]  [place] [id number]  Phone: [phone]  Fax: [phone]  Follow Up Time:     Eliceo Thurston)  Critical Care Medicine; Internal Medicine; Pulmonary Disease  [address] [place] [address]  Phone: [phone]  Fax: [phone]  Follow Up Time:

## 2019-05-29 NOTE — CONSULT NOTE ADULT - RS GEN PE MLT RESP DETAILS PC
no rhonchi/good air movement/airway patent/breath sounds equal/normal/no rales/wheezes good air movement/breath sounds equal/normal/clear to auscultation bilaterally/no rhonchi/no wheezes/no rales

## 2019-05-29 NOTE — CONSULT NOTE ADULT - SUBJECTIVE AND OBJECTIVE BOX
[facility] : Progress Note :: [phone], (office [phone]),   Dr La / Dr Hui / Dr Barber / Dr Villalobos / Dr Hang CASTELLANO / Dr Mehta / Dr Sanchez / Dr Alber dumont  _____________________________________________________________________________________________  Patient is a 39y Male with ESRD on HD MWF at [facility] with non-compliance with dialysis schedule. Presented to ED last night with left knee pain and SOB after missing hemodialysis. In ED was noted with pulmonary edema. and hypoxia. Was consulted overnight for emergent hemodialysis. CT knee did not reveal any abcess. s/p a dose of vancomycin.    PAST MEDICAL & SURGICAL HISTORY:  Migraine  HTN (hypertension)  DM (diabetes mellitus)  Bipolar disorder  Schizophrenia  ESRD on dialysis  Morbid obesity with BMI of 40.0-44.9, adult  H/O hernia repair    aspirin (Short breath)  aspirin (Swelling)  penicillin (Short breath)  penicillins (Swelling)  shellfish (Unknown)    Home Medications Reviewed  Hospital Medications:   MEDICATIONS  (STANDING):  ALBUTerol    90 MICROgram(s) HFA Inhaler 1 Puff(s) Inhalation every 4 hours  ALBUTerol/ipratropium for Nebulization 3 milliLiter(s) Nebulizer every 6 hours  amLODIPine   Tablet 5 milliGRAM(s) Oral daily  chlorhexidine 4% Liquid 1 Application(s) Topical <User Schedule>  clopidogrel Tablet 75 milliGRAM(s) Oral daily  ferrous    sulfate 325 milliGRAM(s) Oral daily  furosemide    Tablet 80 milliGRAM(s) Oral daily  heparin  Injectable 5000 Unit(s) SubCutaneous every 12 hours  hydrALAZINE 50 milliGRAM(s) Oral three times a day  insulin glargine Injectable (LANTUS) 10 Unit(s) SubCutaneous at bedtime  insulin lispro (HumaLOG) corrective regimen sliding scale   SubCutaneous three times a day before meals  insulin lispro (HumaLOG) corrective regimen sliding scale   SubCutaneous at bedtime  metoprolol tartrate 25 milliGRAM(s) Oral two times a day  pantoprazole    Tablet 40 milliGRAM(s) Oral before breakfast  QUEtiapine 200 milliGRAM(s) Oral daily  sevelamer carbonate 2400 milliGRAM(s) Oral three times a day with meals  simvastatin 40 milliGRAM(s) Oral at bedtime  tiotropium 18 MICROgram(s) Capsule 1 Capsule(s) Inhalation daily    SOCIAL HISTORY:  Denies ETOh,Smoking,   FAMILY HISTORY:  Family history of COPD (chronic obstructive pulmonary disease)  Family history of diabetes mellitus        VITALS:  T(F): 97.9 (05-29-19 @ 15:00), Max: 98.9 (05-28-19 @ 21:21)  HR: 77 (05-29-19 @ 15:00)  BP: 143/96 (05-29-19 @ 15:00)  RR: 16 (05-29-19 @ 15:00)  SpO2: 100% (05-29-19 @ 15:00)  Wt(kg): --    Height (cm): 200.66 (05-28 @ 21:21)  Weight (kg): 183.7 (05-28 @ 21:21)  BMI (kg/m2): 45.6 (05-28 @ 21:21)  BSA (m2): 3.08 (05-28 @ 21:21)    PHYSICAL EXAM:  Constitutional: NAD  HEENT: anicteric sclera, oropharynx clear.  Neck: No JVD  Respiratory: CTAB, no wheezes, rales or rhonchi  Cardiovascular: S1, S2, RRR  Gastrointestinal: BS+, soft, NT/ND  Extremities: peripheral edema++  Neurological: A/O x 3, no focal deficits  : No CVA tenderness. No hernandez.       LABS:  05-29    135  |  95<L>  |  62<H>  ----------------------------<  172<H>  4.4   |  29  |  10.50<H>    Ca    6.9<L>      29 May 2019 10:21  Phos  8.2     05-29  Mg     2.0     05-29    TPro  7.5  /  Alb  3.1<L>  /  TBili  0.4  /  DBili      /  AST  32  /  ALT  38  /  AlkPhos  110  05-29    Creatinine Trend: 10.50 <--, 13.20 <--                        8.8    10.08 )-----------( 229      ( 29 May 2019 00:37 )             28.6     Urine Studies:      RADIOLOGY & ADDITIONAL STUDIES:

## 2019-05-29 NOTE — H&P ADULT - RS GEN PE MLT RESP DETAILS PC
no intercostal retractions/good air movement/no rales/no wheezes/no chest wall tenderness/no rhonchi/clear to auscultation bilaterally

## 2019-05-29 NOTE — CONSULT NOTE ADULT - PROBLEM SELECTOR RECOMMENDATION 9
- no iv opioids  - no nsaids due to ckd  - iv acetaminophen prn  - can do tramadol or Tylenol #3 as needed  - ortho consult  - pt with multiple admission for similar cc.  Pt demanding iv opioids.  Pt has been known to be noncompliant with HD.    - stool softeners   - last urine drug screen - + for cocaine

## 2019-05-29 NOTE — CONSULT NOTE ADULT - CONSULT REQUESTED BY NAME
21 Aug 2017, 9:43AM.   Patient seen and examined prior to procedure. Chart and data reviewed. No significant interval changes from previous evaluation noted. Stable for procedure as intended and discussed.  [facility] dr. winchester

## 2019-05-29 NOTE — H&P ADULT - PROBLEM SELECTOR PLAN 6
patient in paroxetine, mirtazepine and risperidone at home   Hold home meds for prolonged Qtc   c/w Seroquel for now

## 2019-05-29 NOTE — H&P ADULT - NSHPPHYSICALEXAM_GEN_ALL_CORE
Vital Signs Last 24 Hrs  T(C): 37.1 (28 May 2019 23:40), Max: 37.2 (28 May 2019 21:21)  T(F): 98.8 (28 May 2019 23:40), Max: 98.9 (28 May 2019 21:21)  HR: 97 (28 May 2019 23:40) (96 - 97)  BP: 163/103 (28 May 2019 23:40) (161/103 - 163/103)  RR: 19 (28 May 2019 23:40) (19 - 20)  SpO2: 98% (28 May 2019 23:40) (98% - 100%)

## 2019-05-29 NOTE — DISCHARGE NOTE PROVIDER - PROVIDER TOKENS
PROVIDER:[TOKEN:[[id number]]] PROVIDER:[TOKEN:[[id number]]],PROVIDER:[[id number]]]

## 2019-05-29 NOTE — H&P ADULT - PROBLEM SELECTOR PLAN 2
Patient presented with left knee pain, swelling  and tenderness  CT scan of knee Circumferential infiltration of the subcutaneous soft tissues about the left knee without evidence of a drainable collection. No areas of cortical destruction to suggest osteomyelitis. concern of cellulites  pt has similar finding on previous CT scan   likely acute on chronic cellulitis   s/p 1000mg vanco  -pain control   -ortho consult Patient presented with left knee pain, swelling  and tenderness  CT scan of knee Circumferential infiltration of the subcutaneous soft tissues about the left knee without evidence of a drainable collection. No areas of cortical destruction to suggest osteomyelitis. concern of cellulites  pt has similar finding on previous CT scan   likely acute on chronic cellulitis   s/p 1000mg vanco  -pain control   -ortho consult  _ ID consult Dr Amaral

## 2019-05-29 NOTE — CONSULT NOTE ADULT - SUBJECTIVE AND OBJECTIVE BOX
HPI:  39 Male with PMH of Type 2 DM, Migraine, cocaine abuse last dose week ago, ESRD on HD (left arm graft, M/W/F), Morbid obesity, HTN, Chr dyspnea (uses home O2 prn), Schizophrenia, Bacteremia due to foot cellulitis presented to ED c/o left knee pain for 2 days. Pain describes knee pain as sharp, 10/10 in intensity, non radiating, worse with movement, and unable to ambulate. . Pt reports that yesterday after riding in a car he was unable to get out secondary to severe pain in the L knee. Pt states that his PCP referred him to orthopedist Dr. Velazquez and he has a scheduled appointment on Anya 3. Pt relates an ACL injury when he was a teenager and denies any recent trauma. Pt endorses that his knee feels swollen. Patient also report of difficulty breathing since 1 day, he use his albuterol inhalers multiple times with no significant improvement in his symptoms. Patient missed his HD on Monday because he was unable to go to HD centre due to knee pain. patient denies fever, chills, headaches, chest pain, palpitation, nausea, vomiting, diarrhea, abdominal pain/distension, dysuria, skin rashes.  patient used cocaine last week    History as noted above. Pt admits to significant pain to the left knee. 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 Pt denies any current F/V/N/C/SOB. Pt denies any current pain, burning, numbness, or tingling. Pt denies any constipation or diarrhea.     on admission pt was afebrile, hypertensive, O2 sat 100 on suppl O2  cbc showed no leucocytosis, Hb 8.8  bmp hyperkalemia K 5.5 No EKG changes  elevated BUN/cr   ABG - ( 29 May 2019 00:45 )  pH, Arterial: 7.32  pH, Blood: x     /  pCO2: 51    /  pO2: 223   / HCO3: 26    / Base Excess: -0.3  /  SaO2: 100     CT scan of knee Circumferential infiltration of the subcutaneous soft tissues about the left knee without evidence of a drainable collection. No areas of cortical destruction to suggest osteomyelitis. concern of cellulites  CXR showed congestion ++ (29 May 2019 03:53)      PAST MEDICAL & SURGICAL HISTORY:  Migraine  HTN (hypertension)  DM (diabetes mellitus)  Bipolar disorder  Schizophrenia  ESRD on dialysis  Morbid obesity with BMI of 40.0-44.9, adult  H/O hernia repair      aspirin (Short breath)  aspirin (Swelling)  penicillin (Short breath)  penicillins (Swelling)  shellfish (Unknown)      Meds:  ALBUTerol    90 MICROgram(s) HFA Inhaler 1 Puff(s) Inhalation every 4 hours  ALBUTerol/ipratropium for Nebulization 3 milliLiter(s) Nebulizer every 6 hours  amLODIPine   Tablet 5 milliGRAM(s) Oral daily  chlorhexidine 4% Liquid 1 Application(s) Topical <User Schedule>  clopidogrel Tablet 75 milliGRAM(s) Oral daily  ferrous    sulfate 325 milliGRAM(s) Oral daily  furosemide    Tablet 80 milliGRAM(s) Oral daily  heparin  Injectable 5000 Unit(s) SubCutaneous every 12 hours  hydrALAZINE 50 milliGRAM(s) Oral three times a day  insulin glargine Injectable (LANTUS) 10 Unit(s) SubCutaneous at bedtime  insulin lispro (HumaLOG) corrective regimen sliding scale   SubCutaneous three times a day before meals  insulin lispro (HumaLOG) corrective regimen sliding scale   SubCutaneous at bedtime  metoprolol tartrate 25 milliGRAM(s) Oral two times a day  pantoprazole    Tablet 40 milliGRAM(s) Oral before breakfast  QUEtiapine 200 milliGRAM(s) Oral daily  sevelamer carbonate 2400 milliGRAM(s) Oral three times a day with meals  simvastatin 40 milliGRAM(s) Oral at bedtime  tiotropium 18 MICROgram(s) Capsule 1 Capsule(s) Inhalation daily      SOCIAL HISTORY:  Smoker:  YES   ETOH use:  YES   Ilicit Drug use:  YES   Occupation: Bouncer   Assisted device use: Cane    FAMILY HISTORY:  Family history of COPD (chronic obstructive pulmonary disease)  Family history of diabetes mellitus      VITALS:  Vital Signs Last 24 Hrs  T(C): 36.7 (29 May 2019 07:25), Max: 37.2 (28 May 2019 21:21)  T(F): 98 (29 May 2019 07:25), Max: 98.9 (28 May 2019 21:21)  HR: 84 (29 May 2019 07:25) (84 - 97)  BP: 160/95 (29 May 2019 07:25) (137/79 - 163/103)  BP(mean): --  RR: 16 (29 May 2019 07:25) (14 - 20)  SpO2: 100% (29 May 2019 07:25) (98% - 100%)    LABS/DIAGNOSTIC TESTS:                          8.8    10.08 )-----------( 229      ( 29 May 2019 00:37 )             28.6     WBC Count: 10.08 K/uL (05-29 @ 00:37)      05-29    135  |  95<L>  |  62<H>  ----------------------------<  172<H>  4.4   |  29  |  10.50<H>    Ca    6.9<L>      29 May 2019 10:21  Phos  8.2     05-29  Mg     2.0     05-29    TPro  7.5  /  Alb  3.1<L>  /  TBili  0.4  /  DBili  x   /  AST  32  /  ALT  38  /  AlkPhos  110  05-29          LIVER FUNCTIONS - ( 29 May 2019 00:37 )  Alb: 3.1 g/dL / Pro: 7.5 g/dL / ALK PHOS: 110 U/L / ALT: 38 U/L DA / AST: 32 U/L / GGT: x             PT/INR - ( 29 May 2019 00:37 )   PT: 10.6 sec;   INR: 0.96 ratio         PTT - ( 29 May 2019 00:37 )  PTT:22.4 sec    LACTATE:    ABG - ABG - ( 29 May 2019 00:45 )  pH, Arterial: 7.32  pH, Blood: x     /  pCO2: 51    /  pO2: 223   / HCO3: 26    / Base Excess: -0.3  /  SaO2: 100                 CULTURES:   .Blood  05-13 @ 22:21   No growth at 5 days.  --  --      .Blood  05-13 @ 14:24   No growth at 5 days.  --  --      .Abscess  05-01 @ 18:29   Moderate Enterococcus faecalis (vancomycin resistant)  Numerous Corynebacterium species "Susceptibilities not performed"  Few Coag Negative Staphylococcus "Susceptibilities not performed"  --  Enterococcus faecalis (vancomycin resistant)      .Urine  04-30 @ 10:56   No growth  --  --      .Blood  04-18 @ 16:48   No growth at 5 days.  --  --      .Blood  04-16 @ 11:10   Growth in anaerobic bottle: Coag Negative Staphylococcus  "Susceptibilities not performed"  "Due to technical problems, Proteus sp. will Not be reported as part of  the BCID panel until further notice"  ***Blood Panel PCR results on this specimen are available  approximately 3 hours after the Gram stain result.***  Gram stain, PCR, and/or culture results may not always  correspond due to difference in methodologies.  ************************************************************  This PCR assay was performed using [employer].  The following targets are tested for: Enterococcus,  vancomycin resistant enterococci, Listeria monocytogenes,  coagulase negative staphylococci, S. aureus,  methicillin resistant S. aureus, Streptococcus agalactiae  (Group B), S. pneumoniae, S. pyogenes (Group A),  Acinetobacter baumannii, Enterobacter cloacae, E. coli,  Klebsiella oxytoca, K. pneumoniae, Proteus sp.,  Serratia marcescens, Haemophilus influenzae,  Neisseria meningitidis, Pseudomonas aeruginosa, Candida  albicans, C. glabrata, C krusei, C parapsilosis,  C. tropicalis and the KPC resistance gene.  --  Blood Culture PCR      .Blood  04-10 @ 00:43   No growth at 5 days.  --  --      .Blood  03-27 @ 11:24   No growth at 5 days.  --  --      .Urine  03-01 @ 10:11   No growth  --  --          RADIOLOGY: < from: CT Knee No Cont, Left (05.29.19 @ 01:30) >    EXAM:  CT KNEE ONLY LT                            PROCEDURE DATE:  05/29/2019          INTERPRETATION:  CLINICAL INDICATION: Left knee pain    TECHNIQUE: Axial CT images were obtained of the left knee without  administration of intravenous contrast. Coronal and sagittal images were   reconstructed. Three   dimensional reformatted images were obtained on an independent   workstation.     COMPARISON: CT of the left lower extremity dated 5/13/2019    FINDINGS:   There is no acute fracture or dislocation. No areas of cortical   destruction to suggest osteomyelitis.    There is no significant joint space narrowing. Again noted is   infiltration of the subcutaneous soft tissues without evidence of a   drainable collection.      IMPRESSION:  No acute fracture or dislocation.     Circumferential infiltration of the subcutaneous soft tissues about the   left knee without evidence of a drainable collection. No areas of   cortical destruction to suggest osteomyelitis. Findings may represent   cellulitis in the correct clinical setting.                NEREYDA JACKSON M.D., ATTENDING RADIOLOGIST  This document has been electronically signed. May 29 2019  2:09AM        < end of copied text >        ROS  [  ] UNABLE TO ELICIT HPI:  39 Male with PMH of Type 2 DM, Migraine, cocaine abuse last dose week ago, ESRD on HD (left arm graft, M/W/F), Morbid obesity, HTN, Chr dyspnea (uses home O2 prn), Schizophrenia, Bacteremia due to foot cellulitis presented to ED c/o left knee pain for 2 days. Pain describes knee pain as sharp, 10/10 in intensity, non radiating, worse with movement, and unable to ambulate. . Pt reports that yesterday after riding in a car he was unable to get out secondary to severe pain in the L knee. Pt states that his PCP referred him to orthopedist Dr. Velazquez and he has a scheduled appointment on Anya 3. Pt relates an ACL injury when he was a teenager and denies any recent trauma. Pt endorses that his knee feels swollen. Patient also report of difficulty breathing since 1 day, he use his albuterol inhalers multiple times with no significant improvement in his symptoms. Patient missed his HD on Monday because he was unable to go to HD centre due to knee pain. patient denies fever, chills, headaches, chest pain, palpitation, nausea, vomiting, diarrhea, abdominal pain/distension, dysuria, skin rashes.  patient used cocaine last week    History as noted above. Pt admits to significant pain to the left knee. 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 Pt denies any current F/V/N/C/SOB. Pt denies any current pain, burning, numbness, or tingling. Pt denies any constipation or diarrhea.  Asked to eval pt to see if he has left knee or leg cellulitis.        PAST MEDICAL & SURGICAL HISTORY:  Migraine  HTN (hypertension)  DM (diabetes mellitus)  Bipolar disorder  Schizophrenia  ESRD on dialysis  Morbid obesity with BMI of 40.0-44.9, adult  H/O hernia repair      aspirin (Short breath)  aspirin (Swelling)  penicillin (Short breath)  penicillins (Swelling)  shellfish (Unknown)      Meds:  ALBUTerol    90 MICROgram(s) HFA Inhaler 1 Puff(s) Inhalation every 4 hours  ALBUTerol/ipratropium for Nebulization 3 milliLiter(s) Nebulizer every 6 hours  amLODIPine   Tablet 5 milliGRAM(s) Oral daily  chlorhexidine 4% Liquid 1 Application(s) Topical <User Schedule>  clopidogrel Tablet 75 milliGRAM(s) Oral daily  ferrous    sulfate 325 milliGRAM(s) Oral daily  furosemide    Tablet 80 milliGRAM(s) Oral daily  heparin  Injectable 5000 Unit(s) SubCutaneous every 12 hours  hydrALAZINE 50 milliGRAM(s) Oral three times a day  insulin glargine Injectable (LANTUS) 10 Unit(s) SubCutaneous at bedtime  insulin lispro (HumaLOG) corrective regimen sliding scale   SubCutaneous three times a day before meals  insulin lispro (HumaLOG) corrective regimen sliding scale   SubCutaneous at bedtime  metoprolol tartrate 25 milliGRAM(s) Oral two times a day  pantoprazole    Tablet 40 milliGRAM(s) Oral before breakfast  QUEtiapine 200 milliGRAM(s) Oral daily  sevelamer carbonate 2400 milliGRAM(s) Oral three times a day with meals  simvastatin 40 milliGRAM(s) Oral at bedtime  tiotropium 18 MICROgram(s) Capsule 1 Capsule(s) Inhalation daily      SOCIAL HISTORY:  Smoker:  YES   ETOH use:  YES   Ilicit Drug use:  YES   Occupation: Bouncer   Assisted device use: Cane    FAMILY HISTORY:  Family history of COPD (chronic obstructive pulmonary disease)  Family history of diabetes mellitus      VITALS:  Vital Signs Last 24 Hrs  T(C): 36.7 (29 May 2019 07:25), Max: 37.2 (28 May 2019 21:21)  T(F): 98 (29 May 2019 07:25), Max: 98.9 (28 May 2019 21:21)  HR: 84 (29 May 2019 07:25) (84 - 97)  BP: 160/95 (29 May 2019 07:25) (137/79 - 163/103)  BP(mean): --  RR: 16 (29 May 2019 07:25) (14 - 20)  SpO2: 100% (29 May 2019 07:25) (98% - 100%)    LABS/DIAGNOSTIC TESTS:                          8.8    10.08 )-----------( 229      ( 29 May 2019 00:37 )             28.6     WBC Count: 10.08 K/uL (05-29 @ 00:37)      05-29    135  |  95<L>  |  62<H>  ----------------------------<  172<H>  4.4   |  29  |  10.50<H>    Ca    6.9<L>      29 May 2019 10:21  Phos  8.2     05-29  Mg     2.0     05-29    TPro  7.5  /  Alb  3.1<L>  /  TBili  0.4  /  DBili  x   /  AST  32  /  ALT  38  /  AlkPhos  110  05-29          LIVER FUNCTIONS - ( 29 May 2019 00:37 )  Alb: 3.1 g/dL / Pro: 7.5 g/dL / ALK PHOS: 110 U/L / ALT: 38 U/L DA / AST: 32 U/L / GGT: x             PT/INR - ( 29 May 2019 00:37 )   PT: 10.6 sec;   INR: 0.96 ratio         PTT - ( 29 May 2019 00:37 )  PTT:22.4 sec    LACTATE:    ABG - ABG - ( 29 May 2019 00:45 )  pH, Arterial: 7.32  pH, Blood: x     /  pCO2: 51    /  pO2: 223   / HCO3: 26    / Base Excess: -0.3  /  SaO2: 100                 CULTURES:   .Blood  05-13 @ 22:21   No growth at 5 days.  --  --      .Blood  05-13 @ 14:24   No growth at 5 days.  --  --                RADIOLOGY: < from: CT Knee No Cont, Left (05.29.19 @ 01:30) >    EXAM:  CT KNEE ONLY LT                            PROCEDURE DATE:  05/29/2019          INTERPRETATION:  CLINICAL INDICATION: Left knee pain    TECHNIQUE: Axial CT images were obtained of the left knee without  administration of intravenous contrast. Coronal and sagittal images were   reconstructed. Three   dimensional reformatted images were obtained on an independent   workstation.     COMPARISON: CT of the left lower extremity dated 5/13/2019    FINDINGS:   There is no acute fracture or dislocation. No areas of cortical   destruction to suggest osteomyelitis.    There is no significant joint space narrowing. Again noted is   infiltration of the subcutaneous soft tissues without evidence of a   drainable collection.      IMPRESSION:  No acute fracture or dislocation.     Circumferential infiltration of the subcutaneous soft tissues about the   left knee without evidence of a drainable collection. No areas of   cortical destruction to suggest osteomyelitis. Findings may represent   cellulitis in the correct clinical setting.                NEREYDA JACKSON M.D., ATTENDING RADIOLOGIST  This document has been electronically signed. May 29 2019  2:09AM        < end of copied text >        ROS  [  ] UNABLE TO ELICIT

## 2019-05-29 NOTE — H&P ADULT - PROBLEM SELECTOR PLAN 7
patient was advised lifestyle modification   pt will benefit from bariatric surgery eval  low carb and fat diet

## 2019-05-29 NOTE — H&P ADULT - PROBLEM SELECTOR PLAN 9
IMPROVE VTE Individual Risk Assessment          RISK                                                          Points  [  ] Previous VTE                                                3  [  ] Thrombophilia                                             2  [  ] Lower limb paralysis                                   2        (unable to hold up >15 seconds)    [  ] Current Cancer                                             2         (within 6 months)  [ x ] Immobilization > 24 hrs                              1  [  ] ICU/CCU stay > 24 hours                             1  [  ] Age > 60                                                         1    IMPROVE VTE Score: VTE score 1  high risk for DVT   heparin Sq

## 2019-05-30 LAB
GLUCOSE BLDC GLUCOMTR-MCNC: 103 MG/DL — HIGH (ref 70–99)
GLUCOSE BLDC GLUCOMTR-MCNC: 122 MG/DL — HIGH (ref 70–99)
GLUCOSE BLDC GLUCOMTR-MCNC: 128 MG/DL — HIGH (ref 70–99)
GLUCOSE BLDC GLUCOMTR-MCNC: 154 MG/DL — HIGH (ref 70–99)

## 2019-05-30 PROCEDURE — 73721 MRI JNT OF LWR EXTRE W/O DYE: CPT | Mod: 26,LT

## 2019-05-30 RX ORDER — TRAMADOL HYDROCHLORIDE 50 MG/1
50 TABLET ORAL ONCE
Refills: 0 | Status: DISCONTINUED | OUTPATIENT
Start: 2019-05-30 | End: 2019-05-30

## 2019-05-30 RX ORDER — TRAMADOL HYDROCHLORIDE 50 MG/1
50 TABLET ORAL ONCE
Refills: 0 | Status: DISCONTINUED | OUTPATIENT
Start: 2019-05-30 | End: 2019-05-31

## 2019-05-30 RX ADMIN — Medication 3 MILLILITER(S): at 09:18

## 2019-05-30 RX ADMIN — Medication 50 MILLIGRAM(S): at 16:27

## 2019-05-30 RX ADMIN — Medication 325 MILLIGRAM(S): at 16:27

## 2019-05-30 RX ADMIN — HEPARIN SODIUM 5000 UNIT(S): 5000 INJECTION INTRAVENOUS; SUBCUTANEOUS at 17:20

## 2019-05-30 RX ADMIN — CHLORHEXIDINE GLUCONATE 1 APPLICATION(S): 213 SOLUTION TOPICAL at 05:42

## 2019-05-30 RX ADMIN — Medication 25 MILLIGRAM(S): at 17:20

## 2019-05-30 RX ADMIN — SIMVASTATIN 40 MILLIGRAM(S): 20 TABLET, FILM COATED ORAL at 21:25

## 2019-05-30 RX ADMIN — SEVELAMER CARBONATE 2400 MILLIGRAM(S): 2400 POWDER, FOR SUSPENSION ORAL at 17:20

## 2019-05-30 RX ADMIN — INSULIN GLARGINE 10 UNIT(S): 100 INJECTION, SOLUTION SUBCUTANEOUS at 21:26

## 2019-05-30 RX ADMIN — Medication 3 MILLILITER(S): at 15:45

## 2019-05-30 RX ADMIN — TRAMADOL HYDROCHLORIDE 50 MILLIGRAM(S): 50 TABLET ORAL at 11:49

## 2019-05-30 RX ADMIN — Medication 1: at 17:20

## 2019-05-30 RX ADMIN — QUETIAPINE FUMARATE 200 MILLIGRAM(S): 200 TABLET, FILM COATED ORAL at 16:28

## 2019-05-30 RX ADMIN — CLOPIDOGREL BISULFATE 75 MILLIGRAM(S): 75 TABLET, FILM COATED ORAL at 16:28

## 2019-05-30 RX ADMIN — Medication 50 MILLIGRAM(S): at 21:25

## 2019-05-30 RX ADMIN — SEVELAMER CARBONATE 2400 MILLIGRAM(S): 2400 POWDER, FOR SUSPENSION ORAL at 11:35

## 2019-05-30 RX ADMIN — ERYTHROPOIETIN 10000 UNIT(S): 10000 INJECTION, SOLUTION INTRAVENOUS; SUBCUTANEOUS at 12:47

## 2019-05-30 RX ADMIN — Medication 3 MILLILITER(S): at 20:09

## 2019-05-30 NOTE — PROGRESS NOTE ADULT - SUBJECTIVE AND OBJECTIVE BOX
[facility] : Progress Note :: [phone], (office [phone]),   Dr La / Dr Hui / Dr Barber / Dr Villalobos / Dr Hang CASTELLANO / Dr Mehta / Dr Sanchez / Dr Alber dumont  _____________________________________________________________________________________________    seen on HD. AV access infiltrated post HD .    aspirin (Short breath)  aspirin (Swelling)  penicillin (Short breath)  penicillins (Swelling)  shellfish (Unknown)    Hospital Medications:   MEDICATIONS  (STANDING):  ALBUTerol    90 MICROgram(s) HFA Inhaler 1 Puff(s) Inhalation every 4 hours  ALBUTerol/ipratropium for Nebulization 3 milliLiter(s) Nebulizer every 6 hours  amLODIPine   Tablet 5 milliGRAM(s) Oral daily  chlorhexidine 4% Liquid 1 Application(s) Topical <User Schedule>  clopidogrel Tablet 75 milliGRAM(s) Oral daily  epoetin cyndie Injectable 80283 Unit(s) IV Push <User Schedule>  ferrous    sulfate 325 milliGRAM(s) Oral daily  furosemide    Tablet 80 milliGRAM(s) Oral daily  heparin  Injectable 5000 Unit(s) SubCutaneous every 12 hours  hydrALAZINE 50 milliGRAM(s) Oral three times a day  insulin glargine Injectable (LANTUS) 10 Unit(s) SubCutaneous at bedtime  insulin lispro (HumaLOG) corrective regimen sliding scale   SubCutaneous three times a day before meals  insulin lispro (HumaLOG) corrective regimen sliding scale   SubCutaneous at bedtime  metoprolol tartrate 25 milliGRAM(s) Oral two times a day  pantoprazole    Tablet 40 milliGRAM(s) Oral before breakfast  QUEtiapine 200 milliGRAM(s) Oral daily  sevelamer carbonate 2400 milliGRAM(s) Oral three times a day with meals  simvastatin 40 milliGRAM(s) Oral at bedtime  tiotropium 18 MICROgram(s) Capsule 1 Capsule(s) Inhalation daily  traMADol 50 milliGRAM(s) Oral once        VITALS:  T(F): 98.3 (05-30-19 @ 20:43), Max: 98.7 (05-30-19 @ 12:30)  HR: 81 (05-30-19 @ 21:53)  BP: 138/80 (05-30-19 @ 20:43)  RR: 18 (05-30-19 @ 20:43)  SpO2: 97% (05-30-19 @ 21:53)  Wt(kg): --      PHYSICAL EXAM:  Constitutional: NAD  HEENT: anicteric sclera, oropharynx clear.  Neck: No JVD  Respiratory: CTAB, no wheezes, rales or rhonchi  Cardiovascular: S1, S2, RRR  Gastrointestinal: BS+, soft, NT/ND  Extremities:  peripheral edema+++.  Neurological: A/O x 3, no focal deficits  Vascular Access: AVF canulated    LABS:  05-29    135  |  95<L>  |  62<H>  ----------------------------<  172<H>  4.4   |  29  |  10.50<H>    Ca    6.9<L>      29 May 2019 10:21  Phos  8.2     05-29  Mg     2.0     05-29    TPro  7.5  /  Alb  3.1<L>  /  TBili  0.4  /  DBili      /  AST  32  /  ALT  38  /  AlkPhos  110  05-29    Creatinine Trend: 10.50 <--, 13.20 <--                        8.8    10.08 )-----------( 229      ( 29 May 2019 00:37 )             28.6     Urine Studies:      RADIOLOGY & ADDITIONAL STUDIES:

## 2019-05-30 NOTE — CHART NOTE - NSCHARTNOTEFT_GEN_A_CORE
EVENT: Left knee pain:  Patient reporting breakthrough L knee pain 7/10. Tramadol 50md ordered x 1 dose.     OBJECTIVE:  Vital Signs Last 24 Hrs  T(C): 36.9 (30 May 2019 02:03), Max: 36.9 (29 May 2019 23:58)  T(F): 98.4 (30 May 2019 02:03), Max: 98.5 (29 May 2019 23:58)  HR: 82 (30 May 2019 02:03) (72 - 93)  BP: 120/57 (30 May 2019 02:03) (120/57 - 160/95)  BP(mean): 100 (29 May 2019 19:00) (100 - 106)  RR: 20 (30 May 2019 02:03) (14 - 20)  SpO2: 95% (30 May 2019 02:03) (95% - 100%)    FOCUSED PHYSICAL EXAM:    LABS:                        8.8    10.08 )-----------( 229      ( 29 May 2019 00:37 )             28.6   CARDIAC MARKERS ( 29 May 2019 00:37 )  0.023 ng/mL / x     / x     / x     / x        05-29    135  |  95<L>  |  62<H>  ----------------------------<  172<H>  4.4   |  29  |  10.50<H>    Ca    6.9<L>      29 May 2019 10:21  Phos  8.2     05-29  Mg     2.0     05-29    TPro  7.5  /  Alb  3.1<L>  /  TBili  0.4  /  DBili  x   /  AST  32  /  ALT  38  /  AlkPhos  110  05-29      EKG:   IMGAGING:    ASSESSMENT:  HPI:  39 Male with PMH of Type 2 DM, Migraine, cocaine abuse last dose week ago, ESRD on HD (left arm graft, M/W/F), Morbid obesity, HTN, Chr dyspnea (uses home O2 prn), Schizophrenia, Bacteremia due to foot cellulitis presented to ED c/o left knee pain for 2 days. Pain describes knee pain as sharp, 10/10 in intensity, non radiating, worse with movement, and unable to ambulate. . Pt reports that yesterday after riding in a car he was unable to get out secondary to severe pain in the L knee. Pt states that his PCP referred him to orthopedist Dr. Velazquez and he has a scheduled appointment on Anya 3. Pt relates an ACL injury when he was a teenager and denies any recent trauma. Pt endorses that his knee feels swollen. Patient also report of difficulty breathing since 1 day, he use his albuterol inhalers multiple times with no significant improvement in his symptoms. Patient missed his HD on Monday because he was unable to go to HD centre due to knee pain. patient denies fever, chills, headaches, chest pain, palpitation, nausea, vomiting, diarrhea, abdominal pain/distension, dysuria, skin rashes.  patient used cocaine last week    on admission pt was afebrile, hypertensive, O2 sat 100 on suppl O2  cbc showed no leucocytosis, Hb 8.8  bmp hyperkalemia K 5.5 No EKG changes  elevated BUN/cr   ABG - ( 29 May 2019 00:45 )  pH, Arterial: 7.32  pH, Blood: x     /  pCO2: 51    /  pO2: 223   / HCO3: 26    / Base Excess: -0.3  /  SaO2: 100     CT scan of knee Circumferential infiltration of the subcutaneous soft tissues about the left knee without evidence of a drainable collection. No areas of cortical destruction to suggest osteomyelitis. concern of cellulites  CXR showed congestion ++ (29 May 2019 03:53)      PLAN:   - Tramadol 50mg x1    FOLLOW UP / RESULT:

## 2019-05-30 NOTE — PROGRESS NOTE ADULT - SUBJECTIVE AND OBJECTIVE BOX
HPI:  39 Male with PMH of Type 2 DM, Migraine, cocaine abuse last dose week ago, ESRD on HD (left arm graft, M/W/F), Morbid obesity, HTN, Chr dyspnea (uses home O2 prn), Schizophrenia, Bacteremia due to foot cellulitis presented to ED c/o left knee pain for 2 days. Pain describes knee pain as sharp, 10/10 in intensity, non radiating, worse with movement, and unable to ambulate. . Pt reports that yesterday after riding in a car he was unable to get out secondary to severe pain in the L knee. Pt states that his PCP referred him to orthopedist Dr. Velazquez and he has a scheduled appointment on Anya 3. Pt relates an ACL injury when he was a teenager and denies any recent trauma. Pt endorses that his knee feels swollen. Patient also report of difficulty breathing since 1 day, he use his albuterol inhalers multiple times with no significant improvement in his symptoms. Patient missed his HD on Monday because he was unable to go to HD centre due to knee pain. patient denies fever, chills, headaches, chest pain, palpitation, nausea, vomiting, diarrhea, abdominal pain/distension, dysuria, skin rashes.  patient used cocaine last week    on admission pt was afebrile, hypertensive, O2 sat 100 on suppl O2  cbc showed no leucocytosis, Hb 8.8  bmp hyperkalemia K 5.5 No EKG changes  elevated BUN/cr   ABG - ( 29 May 2019 00:45 )  pH, Arterial: 7.32  pH, Blood: x     /  pCO2: 51    /  pO2: 223   / HCO3: 26    / Base Excess: -0.3  /  SaO2: 100     CT scan of knee Circumferential infiltration of the subcutaneous soft tissues about the left knee without evidence of a drainable collection. No areas of cortical destruction to suggest osteomyelitis. concern of cellulites  CXR showed congestion ++ (29 May 2019 03:53)      Patient is a 39y old  Male who presents with a chief complaint of Left knee pain (30 May 2019 13:54)      INTERVAL HPI/OVERNIGHT EVENTS:  T(C): 37.1 (05-30-19 @ 12:30), Max: 37.1 (05-30-19 @ 12:30)  HR: 87 (05-30-19 @ 12:30) (77 - 93)  BP: 149/112 (05-30-19 @ 12:30) (120/57 - 159/88)  RR: 20 (05-30-19 @ 12:30) (16 - 20)  SpO2: 97% (05-30-19 @ 12:30) (95% - 100%)  Wt(kg): --  I&O's Summary      REVIEW OF SYSTEMS: denies fever, chills, SOB, palpitations, chest pain, abdominal pain, nausea, vomitting, diarrhea, constipation, dizziness    MEDICATIONS  (STANDING):  ALBUTerol    90 MICROgram(s) HFA Inhaler 1 Puff(s) Inhalation every 4 hours  ALBUTerol/ipratropium for Nebulization 3 milliLiter(s) Nebulizer every 6 hours  amLODIPine   Tablet 5 milliGRAM(s) Oral daily  chlorhexidine 4% Liquid 1 Application(s) Topical <User Schedule>  clopidogrel Tablet 75 milliGRAM(s) Oral daily  epoetin cyndie Injectable 60326 Unit(s) IV Push <User Schedule>  ferrous    sulfate 325 milliGRAM(s) Oral daily  furosemide    Tablet 80 milliGRAM(s) Oral daily  heparin  Injectable 5000 Unit(s) SubCutaneous every 12 hours  hydrALAZINE 50 milliGRAM(s) Oral three times a day  insulin glargine Injectable (LANTUS) 10 Unit(s) SubCutaneous at bedtime  insulin lispro (HumaLOG) corrective regimen sliding scale   SubCutaneous three times a day before meals  insulin lispro (HumaLOG) corrective regimen sliding scale   SubCutaneous at bedtime  metoprolol tartrate 25 milliGRAM(s) Oral two times a day  pantoprazole    Tablet 40 milliGRAM(s) Oral before breakfast  QUEtiapine 200 milliGRAM(s) Oral daily  sevelamer carbonate 2400 milliGRAM(s) Oral three times a day with meals  simvastatin 40 milliGRAM(s) Oral at bedtime  tiotropium 18 MICROgram(s) Capsule 1 Capsule(s) Inhalation daily    MEDICATIONS  (PRN):      PHYSICAL EXAM:  GENERAL: NAD, well-groomed, well-developed  HEAD:  Atraumatic, Normocephalic  EYES: EOMI, PERRLA, conjunctiva and sclera clear  ENMT: No tonsillar erythema, exudates, or enlargement; Moist mucous membranes, Good dentition, No lesions  NECK: Supple, No JVD, Normal thyroid  NERVOUS SYSTEM:  Alert & Oriented X3, Good concentration; Motor Strength 5/5 B/L upper and lower extremities; DTRs 2+ intact and symmetric  CHEST/LUNG: Clear to percussion bilaterally; No rales, rhonchi, wheezing, or rubs  HEART: Regular rate and rhythm; No murmurs, rubs, or gallops  ABDOMEN: Soft, Nontender, Nondistended; Bowel sounds present  EXTREMITIES:  2+ Peripheral Pulses, No clubbing, cyanosis, or edema  LYMPH: No lymphadenopathy noted  SKIN: No rashes or lesions  LABS:                        8.8    10.08 )-----------( 229      ( 29 May 2019 00:37 )             28.6     05-29    135  |  95<L>  |  62<H>  ----------------------------<  172<H>  4.4   |  29  |  10.50<H>    Ca    6.9<L>      29 May 2019 10:21  Phos  8.2     05-29  Mg     2.0     05-29    TPro  7.5  /  Alb  3.1<L>  /  TBili  0.4  /  DBili  x   /  AST  32  /  ALT  38  /  AlkPhos  110  05-29    PT/INR - ( 29 May 2019 00:37 )   PT: 10.6 sec;   INR: 0.96 ratio         PTT - ( 29 May 2019 00:37 )  PTT:22.4 sec    CAPILLARY BLOOD GLUCOSE      POCT Blood Glucose.: 122 mg/dL (30 May 2019 11:49)  POCT Blood Glucose.: 103 mg/dL (30 May 2019 07:54)  POCT Blood Glucose.: 102 mg/dL (29 May 2019 22:57)  POCT Blood Glucose.: 106 mg/dL (29 May 2019 16:36)      ABG - ( 29 May 2019 00:45 )  pH, Arterial: 7.32  pH, Blood: x     /  pCO2: 51    /  pO2: 223   / HCO3: 26    / Base Excess: -0.3  /  SaO2: 100

## 2019-05-30 NOTE — PROGRESS NOTE ADULT - SUBJECTIVE AND OBJECTIVE BOX
HPI:  39 Male with PMH of Type 2 DM, Migraine, cocaine abuse last dose week ago, ESRD on HD (left arm graft, M/W/F), Morbid obesity, HTN, Chr dyspnea (uses home O2 prn), Schizophrenia, Bacteremia due to foot cellulitis presented to ED c/o left knee pain for 2 days. Pain describes knee pain as sharp, 10/10 in intensity, non radiating, worse with movement, and unable to ambulate. . Pt reports that yesterday after riding in a car he was unable to get out secondary to severe pain in the L knee. Pt states that his PCP referred him to orthopedist Dr. Velazquez and he has a scheduled appointment on Anya 3. Pt relates an ACL injury when he was a teenager and denies any recent trauma. Pt endorses that his knee feels swollen. Patient also report of difficulty breathing since 1 day, he use his albuterol inhalers multiple times with no significant improvement in his symptoms. Patient missed his HD on Monday because he was unable to go to HD centre due to knee pain. patient denies fever, chills, headaches, chest pain, palpitation, nausea, vomiting, diarrhea, abdominal pain/distension, dysuria, skin rashes.  patient used cocaine last week    on admission pt was afebrile, hypertensive, O2 sat 100 on suppl O2  cbc showed no leucocytosis, Hb 8.8  bmp hyperkalemia K 5.5 No EKG changes  elevated BUN/cr   ABG - ( 29 May 2019 00:45 )  pH, Arterial: 7.32  pH, Blood: x     /  pCO2: 51    /  pO2: 223   / HCO3: 26    / Base Excess: -0.3  /  SaO2: 100     CT scan of knee Circumferential infiltration of the subcutaneous soft tissues about the left knee without evidence of a drainable collection. No areas of cortical destruction to suggest osteomyelitis. concern of cellulites  CXR showed congestion ++ (29 May 2019 03:53)      Patient is a 39y old  Male who presents with a chief complaint of Left knee pain (29 May 2019 16:48)      INTERVAL HPI/OVERNIGHT EVENTS:  T(C): 37.1 (05-30-19 @ 12:30), Max: 37.1 (05-30-19 @ 12:30)  HR: 87 (05-30-19 @ 12:30) (77 - 93)  BP: 149/112 (05-30-19 @ 12:30) (120/57 - 159/88)  RR: 20 (05-30-19 @ 12:30) (16 - 20)  SpO2: 97% (05-30-19 @ 12:30) (95% - 100%)  Wt(kg): --  I&O's Summary      REVIEW OF SYSTEMS: denies fever, chills, SOB, palpitations, chest pain, abdominal pain, nausea, vomitting, diarrhea, constipation, dizziness    MEDICATIONS  (STANDING):  ALBUTerol    90 MICROgram(s) HFA Inhaler 1 Puff(s) Inhalation every 4 hours  ALBUTerol/ipratropium for Nebulization 3 milliLiter(s) Nebulizer every 6 hours  amLODIPine   Tablet 5 milliGRAM(s) Oral daily  chlorhexidine 4% Liquid 1 Application(s) Topical <User Schedule>  clopidogrel Tablet 75 milliGRAM(s) Oral daily  epoetin cyndie Injectable 92495 Unit(s) IV Push <User Schedule>  ferrous    sulfate 325 milliGRAM(s) Oral daily  furosemide    Tablet 80 milliGRAM(s) Oral daily  heparin  Injectable 5000 Unit(s) SubCutaneous every 12 hours  hydrALAZINE 50 milliGRAM(s) Oral three times a day  insulin glargine Injectable (LANTUS) 10 Unit(s) SubCutaneous at bedtime  insulin lispro (HumaLOG) corrective regimen sliding scale   SubCutaneous three times a day before meals  insulin lispro (HumaLOG) corrective regimen sliding scale   SubCutaneous at bedtime  metoprolol tartrate 25 milliGRAM(s) Oral two times a day  pantoprazole    Tablet 40 milliGRAM(s) Oral before breakfast  QUEtiapine 200 milliGRAM(s) Oral daily  sevelamer carbonate 2400 milliGRAM(s) Oral three times a day with meals  simvastatin 40 milliGRAM(s) Oral at bedtime  tiotropium 18 MICROgram(s) Capsule 1 Capsule(s) Inhalation daily    MEDICATIONS  (PRN):      PHYSICAL EXAM:  GENERAL: NAD, well-groomed, well-developed  HEAD:  Atraumatic, Normocephalic  EYES: EOMI, PERRLA, conjunctiva and sclera clear  ENMT: No tonsillar erythema, exudates, or enlargement; Moist mucous membranes, Good dentition, No lesions  NECK: Supple, No JVD, Normal thyroid  NERVOUS SYSTEM:  Alert & Oriented X3, Good concentration; Motor Strength 5/5 B/L upper and lower extremities; DTRs 2+ intact and symmetric  CHEST/LUNG: Clear to percussion bilaterally; No rales, rhonchi, wheezing, or rubs  HEART: Regular rate and rhythm; No murmurs, rubs, or gallops  ABDOMEN: Soft, Nontender, Nondistended; Bowel sounds present  EXTREMITIES:  2+ Peripheral Pulses, No clubbing, cyanosis, or edema  LYMPH: No lymphadenopathy noted  SKIN: No rashes or lesions  LABS:                        8.8    10.08 )-----------( 229      ( 29 May 2019 00:37 )             28.6     05-29    135  |  95<L>  |  62<H>  ----------------------------<  172<H>  4.4   |  29  |  10.50<H>    Ca    6.9<L>      29 May 2019 10:21  Phos  8.2     05-29  Mg     2.0     05-29    TPro  7.5  /  Alb  3.1<L>  /  TBili  0.4  /  DBili  x   /  AST  32  /  ALT  38  /  AlkPhos  110  05-29    PT/INR - ( 29 May 2019 00:37 )   PT: 10.6 sec;   INR: 0.96 ratio         PTT - ( 29 May 2019 00:37 )  PTT:22.4 sec    CAPILLARY BLOOD GLUCOSE      POCT Blood Glucose.: 122 mg/dL (30 May 2019 11:49)  POCT Blood Glucose.: 103 mg/dL (30 May 2019 07:54)  POCT Blood Glucose.: 102 mg/dL (29 May 2019 22:57)  POCT Blood Glucose.: 106 mg/dL (29 May 2019 16:36)      ABG - ( 29 May 2019 00:45 )  pH, Arterial: 7.32  pH, Blood: x     /  pCO2: 51    /  pO2: 223   / HCO3: 26    / Base Excess: -0.3  /  SaO2: 100

## 2019-05-30 NOTE — PROGRESS NOTE ADULT - ASSESSMENT
#ESRD. seen on HD. UF as tolerated.   # anemia of CKD. procrit on HD  # hyperkalemia: resolved with HD  #HTN cont norvasc, hydralazine and metoprolol  # LT knee pain.  CT results noted  renal osteodystrophy. phos very high sec to non-compliance with binders. cont sevelamer.  d/c planning

## 2019-05-30 NOTE — PROGRESS NOTE ADULT - ASSESSMENT
pt wants to go home as her kid has episode of asthma.   on iv steroids   ama offered but she decided to stay  again wants to go home   on chair comfortable  not in any distress, rr 18  vsstable afebrile lungs a/e fair  mild b/l wheeze  a/p asthma excerab  risks explained for leaving early.  clinically much better  advised to walk around if you are not severely sob can be d/cd on prednisone   in case sob, fever come back to er   prednisone taper see other note

## 2019-05-30 NOTE — PROGRESS NOTE ADULT - ASSESSMENT
seen and examined  ptseen and examined  not in any dstress  seen in hd unit   still c/o knee pain  was following with ortho out pt  as per him had injection tx  vs stable  afebrile  lungs heart abd ok   lt leg nml  knee ok  not swollen  no redness   had mri of knee seen and examined  ptseen and examined  not in any dstress  seen in hd unit   still c/o knee pain  was following with ortho out pt  as per him had injection tx  vs stable  afebrile  lungs heart abd ok   lt leg nml  knee ok  not swollen  no redness   error  went for mri of knee but was not done as pt was uncoperative    oob in chair    pt declined for being d/cd  24 hour notice given

## 2019-05-30 NOTE — CHART NOTE - NSCHARTNOTEFT_GEN_A_CORE
Pt is discharged to home. Pt is refusing as he saying he cannot walk to get downstairs to get to "bus" to HD Pt is discharged to home. Pt is refusing as he saying he cannot walk to get downstairs to get to "bus" to HD. TAHMINA Larson aware. [place] aware to give 24 hour notice. Dr Andino made aware.

## 2019-05-30 NOTE — CHART NOTE - NSCHARTNOTEFT_GEN_A_CORE
Pt initially refusing HD today. Explained that he is fluid overloaded  and that is most likely the reason he is SOB.  He did agree. He was sent to MRI first. Was not able to tolerate and was sent back and then to HD.

## 2019-05-31 LAB
GLUCOSE BLDC GLUCOMTR-MCNC: 109 MG/DL — HIGH (ref 70–99)
GLUCOSE BLDC GLUCOMTR-MCNC: 137 MG/DL — HIGH (ref 70–99)
GLUCOSE BLDC GLUCOMTR-MCNC: 146 MG/DL — HIGH (ref 70–99)
GLUCOSE BLDC GLUCOMTR-MCNC: 98 MG/DL — SIGNIFICANT CHANGE UP (ref 70–99)

## 2019-05-31 RX ORDER — CALCITRIOL 0.5 UG/1
0.25 CAPSULE ORAL DAILY
Refills: 0 | Status: DISCONTINUED | OUTPATIENT
Start: 2019-05-31 | End: 2019-06-05

## 2019-05-31 RX ORDER — TRAMADOL HYDROCHLORIDE 50 MG/1
1 TABLET ORAL
Qty: 10 | Refills: 0
Start: 2019-05-31 | End: 2019-06-04

## 2019-05-31 RX ORDER — GABAPENTIN 400 MG/1
300 CAPSULE ORAL AT BEDTIME
Refills: 0 | Status: DISCONTINUED | OUTPATIENT
Start: 2019-05-31 | End: 2019-06-05

## 2019-05-31 RX ORDER — FOLIC ACID 0.8 MG
1 TABLET ORAL
Qty: 30 | Refills: 0
Start: 2019-05-31 | End: 2019-06-29

## 2019-05-31 RX ORDER — ACETAMINOPHEN 500 MG
975 TABLET ORAL ONCE
Refills: 0 | Status: COMPLETED | OUTPATIENT
Start: 2019-05-31 | End: 2019-05-31

## 2019-05-31 RX ORDER — TRAMADOL HYDROCHLORIDE 50 MG/1
50 TABLET ORAL ONCE
Refills: 0 | Status: DISCONTINUED | OUTPATIENT
Start: 2019-05-31 | End: 2019-05-31

## 2019-05-31 RX ADMIN — HEPARIN SODIUM 5000 UNIT(S): 5000 INJECTION INTRAVENOUS; SUBCUTANEOUS at 17:21

## 2019-05-31 RX ADMIN — Medication 3 MILLILITER(S): at 02:18

## 2019-05-31 RX ADMIN — Medication 50 MILLIGRAM(S): at 14:40

## 2019-05-31 RX ADMIN — Medication 975 MILLIGRAM(S): at 04:30

## 2019-05-31 RX ADMIN — QUETIAPINE FUMARATE 200 MILLIGRAM(S): 200 TABLET, FILM COATED ORAL at 11:39

## 2019-05-31 RX ADMIN — TRAMADOL HYDROCHLORIDE 50 MILLIGRAM(S): 50 TABLET ORAL at 09:07

## 2019-05-31 RX ADMIN — SEVELAMER CARBONATE 2400 MILLIGRAM(S): 2400 POWDER, FOR SUSPENSION ORAL at 11:39

## 2019-05-31 RX ADMIN — Medication 3 MILLILITER(S): at 08:21

## 2019-05-31 RX ADMIN — PANTOPRAZOLE SODIUM 40 MILLIGRAM(S): 20 TABLET, DELAYED RELEASE ORAL at 06:30

## 2019-05-31 RX ADMIN — SEVELAMER CARBONATE 2400 MILLIGRAM(S): 2400 POWDER, FOR SUSPENSION ORAL at 17:21

## 2019-05-31 RX ADMIN — INSULIN GLARGINE 10 UNIT(S): 100 INJECTION, SOLUTION SUBCUTANEOUS at 21:12

## 2019-05-31 RX ADMIN — TRAMADOL HYDROCHLORIDE 50 MILLIGRAM(S): 50 TABLET ORAL at 09:42

## 2019-05-31 RX ADMIN — HEPARIN SODIUM 5000 UNIT(S): 5000 INJECTION INTRAVENOUS; SUBCUTANEOUS at 06:30

## 2019-05-31 RX ADMIN — Medication 975 MILLIGRAM(S): at 05:31

## 2019-05-31 RX ADMIN — Medication 325 MILLIGRAM(S): at 11:39

## 2019-05-31 RX ADMIN — AMLODIPINE BESYLATE 5 MILLIGRAM(S): 2.5 TABLET ORAL at 06:30

## 2019-05-31 RX ADMIN — Medication 80 MILLIGRAM(S): at 06:30

## 2019-05-31 RX ADMIN — Medication 25 MILLIGRAM(S): at 17:22

## 2019-05-31 RX ADMIN — Medication 3 MILLILITER(S): at 15:07

## 2019-05-31 RX ADMIN — Medication 25 MILLIGRAM(S): at 06:30

## 2019-05-31 RX ADMIN — SIMVASTATIN 40 MILLIGRAM(S): 20 TABLET, FILM COATED ORAL at 21:12

## 2019-05-31 RX ADMIN — Medication 50 MILLIGRAM(S): at 21:12

## 2019-05-31 RX ADMIN — SEVELAMER CARBONATE 2400 MILLIGRAM(S): 2400 POWDER, FOR SUSPENSION ORAL at 08:35

## 2019-05-31 RX ADMIN — GABAPENTIN 300 MILLIGRAM(S): 400 CAPSULE ORAL at 22:43

## 2019-05-31 RX ADMIN — CLOPIDOGREL BISULFATE 75 MILLIGRAM(S): 75 TABLET, FILM COATED ORAL at 11:39

## 2019-05-31 RX ADMIN — Medication 50 MILLIGRAM(S): at 06:30

## 2019-05-31 RX ADMIN — Medication 3 MILLILITER(S): at 20:32

## 2019-05-31 RX ADMIN — CALCITRIOL 0.25 MICROGRAM(S): 0.5 CAPSULE ORAL at 13:15

## 2019-05-31 NOTE — PROGRESS NOTE ADULT - ASSESSMENT
#ESRD. s/p HD yesterday. HD tommorrow with UF as tolerated  # anemia of CKD. procrit on HD  #HTN cont norvasc, hydralazine and metoprolol  # LT knee pain.  CT results noted  renal osteodystrophy. phos very high sec to non-compliance with binders. cont sevelamer.    D/C planning

## 2019-05-31 NOTE — PHYSICAL THERAPY INITIAL EVALUATION ADULT - STANDING BALANCE: STATIC
Unremarkable. Assessment:  1. Lower abdominal pain     2. Abnormal CT scan, gastrointestinal tract         Plan:  Colonoscopy 2/21/18  Patient has been instructed to hold aspirin for 3 days prior to the procedure. Prep:  Ludy Andrea, transcribed the above note for Dr Yohannes Roman. Electronically signed by Torsten Lyons 2/15/18 3:38 PM          I Yohannes, have read and agree with the above documentation.   Electronically signed by Yohannes Roman M.D. 2/16/18 10:20 AM good balance

## 2019-05-31 NOTE — PROGRESS NOTE ADULT - SUBJECTIVE AND OBJECTIVE BOX
HPI:  39 Male with PMH of Type 2 DM, Migraine, cocaine abuse last dose week ago, ESRD on HD (left arm graft, M/W/F), Morbid obesity, HTN, Chr dyspnea (uses home O2 prn), Schizophrenia, Bacteremia due to foot cellulitis presented to ED c/o left knee pain for 2 days. Pain describes knee pain as sharp, 10/10 in intensity, non radiating, worse with movement, and unable to ambulate. . Pt reports that yesterday after riding in a car he was unable to get out secondary to severe pain in the L knee. Pt states that his PCP referred him to orthopedist Dr. Velazquez and he has a scheduled appointment on Anya 3. Pt relates an ACL injury when he was a teenager and denies any recent trauma. Pt endorses that his knee feels swollen. Patient also report of difficulty breathing since 1 day, he use his albuterol inhalers multiple times with no significant improvement in his symptoms. Patient missed his HD on Monday because he was unable to go to HD centre due to knee pain. patient denies fever, chills, headaches, chest pain, palpitation, nausea, vomiting, diarrhea, abdominal pain/distension, dysuria, skin rashes.  patient used cocaine last week    on admission pt was afebrile, hypertensive, O2 sat 100 on suppl O2  cbc showed no leucocytosis, Hb 8.8  bmp hyperkalemia K 5.5 No EKG changes  elevated BUN/cr   ABG - ( 29 May 2019 00:45 )  pH, Arterial: 7.32  pH, Blood: x     /  pCO2: 51    /  pO2: 223   / HCO3: 26    / Base Excess: -0.3  /  SaO2: 100     CT scan of knee Circumferential infiltration of the subcutaneous soft tissues about the left knee without evidence of a drainable collection. No areas of cortical destruction to suggest osteomyelitis. concern of cellulites  CXR showed congestion ++ (29 May 2019 03:53)      Patient is a 39y old  Male who presents with a chief complaint of Left knee pain (31 May 2019 14:29)      INTERVAL HPI/OVERNIGHT EVENTS:  T(C): 37.1 (05-31-19 @ 12:54), Max: 37.1 (05-30-19 @ 16:00)  HR: 86 (05-31-19 @ 15:33) (78 - 93)  BP: 138/62 (05-31-19 @ 14:38) (124/85 - 150/79)  RR: 20 (05-31-19 @ 12:54) (18 - 20)  SpO2: 98% (05-31-19 @ 15:33) (97% - 100%)  Wt(kg): --  I&O's Summary      REVIEW OF SYSTEMS: denies fever, chills, SOB, palpitations, chest pain, abdominal pain, nausea, vomitting, diarrhea, constipation, dizziness    MEDICATIONS  (STANDING):  ALBUTerol    90 MICROgram(s) HFA Inhaler 1 Puff(s) Inhalation every 4 hours  ALBUTerol/ipratropium for Nebulization 3 milliLiter(s) Nebulizer every 6 hours  amLODIPine   Tablet 5 milliGRAM(s) Oral daily  calcitriol   Capsule 0.25 MICROGram(s) Oral daily  chlorhexidine 4% Liquid 1 Application(s) Topical <User Schedule>  clopidogrel Tablet 75 milliGRAM(s) Oral daily  epoetin cyndie Injectable 89652 Unit(s) IV Push <User Schedule>  ferrous    sulfate 325 milliGRAM(s) Oral daily  furosemide    Tablet 80 milliGRAM(s) Oral daily  heparin  Injectable 5000 Unit(s) SubCutaneous every 12 hours  hydrALAZINE 50 milliGRAM(s) Oral three times a day  insulin glargine Injectable (LANTUS) 10 Unit(s) SubCutaneous at bedtime  insulin lispro (HumaLOG) corrective regimen sliding scale   SubCutaneous three times a day before meals  insulin lispro (HumaLOG) corrective regimen sliding scale   SubCutaneous at bedtime  metoprolol tartrate 25 milliGRAM(s) Oral two times a day  pantoprazole    Tablet 40 milliGRAM(s) Oral before breakfast  QUEtiapine 200 milliGRAM(s) Oral daily  sevelamer carbonate 2400 milliGRAM(s) Oral three times a day with meals  simvastatin 40 milliGRAM(s) Oral at bedtime  tiotropium 18 MICROgram(s) Capsule 1 Capsule(s) Inhalation daily    MEDICATIONS  (PRN):      PHYSICAL EXAM:  GENERAL: NAD, well-groomed, well-developed  HEAD:  Atraumatic, Normocephalic  EYES: EOMI, PERRLA, conjunctiva and sclera clear  ENMT: No tonsillar erythema, exudates, or enlargement; Moist mucous membranes, Good dentition, No lesions  NECK: Supple, No JVD, Normal thyroid  NERVOUS SYSTEM:  Alert & Oriented X3, Good concentration; Motor Strength 5/5 B/L upper and lower extremities; DTRs 2+ intact and symmetric  CHEST/LUNG: Clear to percussion bilaterally; No rales, rhonchi, wheezing, or rubs  HEART: Regular rate and rhythm; No murmurs, rubs, or gallops  ABDOMEN: Soft, Nontender, Nondistended; Bowel sounds present  EXTREMITIES:  2+ Peripheral Pulses, No clubbing, cyanosis, or edema  LYMPH: No lymphadenopathy noted  SKIN: No rashes or lesions  LABS:              CAPILLARY BLOOD GLUCOSE      POCT Blood Glucose.: 146 mg/dL (31 May 2019 12:03)  POCT Blood Glucose.: 98 mg/dL (31 May 2019 07:57)  POCT Blood Glucose.: 128 mg/dL (30 May 2019 21:07)  POCT Blood Glucose.: 154 mg/dL (30 May 2019 16:44)

## 2019-05-31 NOTE — PROGRESS NOTE ADULT - SUBJECTIVE AND OBJECTIVE BOX
[facility] : Progress Note :: [phone], (office [phone]),   Dr La / Dr Hui / Dr Barebr / Dr Villalobos / Dr Hang CASTELLANO / Dr Mehta / Dr Sanchez / Dr Alber dumont  _____________________________________________________________________________________________     s/p HD yesterday. feels better.   still with leg pain    aspirin (Short breath)  aspirin (Swelling)  penicillin (Short breath)  penicillins (Swelling)  shellfish (Unknown)    Hospital Medications:   MEDICATIONS  (STANDING):  ALBUTerol    90 MICROgram(s) HFA Inhaler 1 Puff(s) Inhalation every 4 hours  ALBUTerol/ipratropium for Nebulization 3 milliLiter(s) Nebulizer every 6 hours  amLODIPine   Tablet 5 milliGRAM(s) Oral daily  calcitriol   Capsule 0.25 MICROGram(s) Oral daily  chlorhexidine 4% Liquid 1 Application(s) Topical <User Schedule>  clopidogrel Tablet 75 milliGRAM(s) Oral daily  epoetin cyndie Injectable 93659 Unit(s) IV Push <User Schedule>  ferrous    sulfate 325 milliGRAM(s) Oral daily  furosemide    Tablet 80 milliGRAM(s) Oral daily  heparin  Injectable 5000 Unit(s) SubCutaneous every 12 hours  hydrALAZINE 50 milliGRAM(s) Oral three times a day  insulin glargine Injectable (LANTUS) 10 Unit(s) SubCutaneous at bedtime  insulin lispro (HumaLOG) corrective regimen sliding scale   SubCutaneous three times a day before meals  insulin lispro (HumaLOG) corrective regimen sliding scale   SubCutaneous at bedtime  metoprolol tartrate 25 milliGRAM(s) Oral two times a day  pantoprazole    Tablet 40 milliGRAM(s) Oral before breakfast  QUEtiapine 200 milliGRAM(s) Oral daily  sevelamer carbonate 2400 milliGRAM(s) Oral three times a day with meals  simvastatin 40 milliGRAM(s) Oral at bedtime  tiotropium 18 MICROgram(s) Capsule 1 Capsule(s) Inhalation daily        VITALS:  T(F): 98.8 (05-31-19 @ 12:54), Max: 98.8 (05-31-19 @ 12:54)  HR: 83 (05-31-19 @ 12:54)  BP: 143/67 (05-31-19 @ 12:54)  RR: 20 (05-31-19 @ 12:54)  SpO2: 100% (05-31-19 @ 12:54)  Wt(kg): --      PHYSICAL EXAM:  Constitutional: NAD  HEENT: anicteric sclera, oropharynx clear.  Neck: No JVD  Respiratory: CTAB, no wheezes, rales or rhonchi  Cardiovascular: S1, S2, RRR  Gastrointestinal: BS+, soft, NT/ND  Extremities:  peripheral edema++  Neurological: A/O x 3, no focal deficits  : No CVA tenderness. No hernandez.   Vascular Access: LUEAVF with thrill and bruit    LABS:        Creatinine Trend: 10.50 <--, 13.20 <--    Urine Studies:      RADIOLOGY & ADDITIONAL STUDIES:

## 2019-05-31 NOTE — CHART NOTE - NSCHARTNOTEFT_GEN_A_CORE
EVENT: Left knee pain; Patient reporting left knee pain which was presenting symptom. Work up has yielded no pertinent findings. Patient appears comfortable and is resting in bed in NAD. Tylenol 975mg ordered x 1.     OBJECTIVE:  Vital Signs Last 24 Hrs  T(C): 36.8 (30 May 2019 20:43), Max: 37.1 (30 May 2019 12:30)  T(F): 98.3 (30 May 2019 20:43), Max: 98.7 (30 May 2019 12:30)  HR: 87 (31 May 2019 02:33) (80 - 90)  BP: 138/80 (30 May 2019 20:43) (124/85 - 172/119)  BP(mean): --  RR: 18 (30 May 2019 20:43) (18 - 20)  SpO2: 97% (31 May 2019 02:33) (95% - 100%)    LABS:    05-29    135  |  95<L>  |  62<H>  ----------------------------<  172<H>  4.4   |  29  |  10.50<H>    Ca    6.9<L>      29 May 2019 10:21  Phos  8.2     05-29  Mg     2.0     05-29      ASSESSMENT:  HPI:  39 Male with PMH of Type 2 DM, Migraine, cocaine abuse last dose week ago, ESRD on HD (left arm graft, M/W/F), Morbid obesity, HTN, Chr dyspnea (uses home O2 prn), Schizophrenia, Bacteremia due to foot cellulitis presented to ED c/o left knee pain for 2 days. Pain describes knee pain as sharp, 10/10 in intensity, non radiating, worse with movement, and unable to ambulate. . Pt reports that yesterday after riding in a car he was unable to get out secondary to severe pain in the L knee. Pt states that his PCP referred him to orthopedist Dr. Velazquez and he has a scheduled appointment on Anya 3. Pt relates an ACL injury when he was a teenager and denies any recent trauma. Pt endorses that his knee feels swollen. Patient also report of difficulty breathing since 1 day, he use his albuterol inhalers multiple times with no significant improvement in his symptoms. Patient missed his HD on Monday because he was unable to go to HD centre due to knee pain. patient denies fever, chills, headaches, chest pain, palpitation, nausea, vomiting, diarrhea, abdominal pain/distension, dysuria, skin rashes.  patient used cocaine last week    on admission pt was afebrile, hypertensive, O2 sat 100 on suppl O2  cbc showed no leucocytosis, Hb 8.8  bmp hyperkalemia K 5.5 No EKG changes  elevated BUN/cr   ABG - ( 29 May 2019 00:45 )  pH, Arterial: 7.32  pH, Blood: x     /  pCO2: 51    /  pO2: 223   / HCO3: 26    / Base Excess: -0.3  /  SaO2: 100     CT scan of knee Circumferential infiltration of the subcutaneous soft tissues about the left knee without evidence of a drainable collection. No areas of cortical destruction to suggest osteomyelitis. concern of cellulites  CXR showed congestion ++ (29 May 2019 03:53)      PLAN:   - Tylenol 975mg PO single dose    FOLLOW UP / RESULT:

## 2019-05-31 NOTE — PROGRESS NOTE ADULT - ASSESSMENT
seen and examined  no complaints   comfortable on bed  not in any distress.  vsstable afebrile physical unchanged    lt knee  pt is moving LUE but when i touched knee, he says lot of pain  knee not swollen  rom okay, as per staff, pt was able to walk in am  mri fluid could be cellulitis   a/p d/w ID  he reveiwed mri also  no cellulitis  as has no redness, no fever, no wbc   as per no ID  oob in chair  h/o asthma, smoking ? emphysema , fluid overload sec to esrd and Non compliance to fluid, plus sleep apnea syndrom  was hypoxic for a while  needs home O2

## 2019-05-31 NOTE — PHYSICAL THERAPY INITIAL EVALUATION ADULT - DIAGNOSIS, PT EVAL
Patient presents with impaired balance during ambulation and transfers due to decreased endurance causing easy fatigue.

## 2019-05-31 NOTE — CHART NOTE - NSCHARTNOTEFT_GEN_A_CORE
39 year old male, esrd - on hd, morbidly obese, sleep apnea, htn, dm admitted with left knee pain.  Pt continues to ask for opioids.  MRI knee - no significant findings.  Pt was able to ambulate with PT.  Would continue to avoid all IV opioids and sparingly give po tramadol.  No nsaids.  renal dose gabapentin.  Pt may require o2 supplementation at home if o2 is low on ambulation.  Continue to follow up with nephro and orthopedist as oupt.

## 2019-05-31 NOTE — PHYSICAL THERAPY INITIAL EVALUATION ADULT - LEVEL OF INDEPENDENCE: SIT/SUPINE, REHAB EVAL
independent
Patient alert, oriented x4, pleasant and cooperative.  Pt denies thoughts to harm himself or others.  Pt denies hearing voices or VH. Nurse and  met with patient to explain discharge plan and patient is in agreement with plan.  Pt provided with a copy of all discharge papers. Pt educated on medication and the need for lab work.  He agreed to f/u with PCP or his outpatient provider.  Pt sent home in a cab to ensure safe return home as he does not have anyone to pick him up.  All belongings returned to patient.

## 2019-06-01 LAB
GLUCOSE BLDC GLUCOMTR-MCNC: 112 MG/DL — HIGH (ref 70–99)
GLUCOSE BLDC GLUCOMTR-MCNC: 113 MG/DL — HIGH (ref 70–99)
GLUCOSE BLDC GLUCOMTR-MCNC: 124 MG/DL — HIGH (ref 70–99)
GLUCOSE BLDC GLUCOMTR-MCNC: 154 MG/DL — HIGH (ref 70–99)

## 2019-06-01 RX ORDER — GUAIFENESIN/DEXTROMETHORPHAN 600MG-30MG
10 TABLET, EXTENDED RELEASE 12 HR ORAL EVERY 6 HOURS
Refills: 0 | Status: DISCONTINUED | OUTPATIENT
Start: 2019-06-01 | End: 2019-06-05

## 2019-06-01 RX ORDER — ACETAMINOPHEN 500 MG
650 TABLET ORAL EVERY 6 HOURS
Refills: 0 | Status: DISCONTINUED | OUTPATIENT
Start: 2019-06-01 | End: 2019-06-05

## 2019-06-01 RX ORDER — IPRATROPIUM/ALBUTEROL SULFATE 18-103MCG
3 AEROSOL WITH ADAPTER (GRAM) INHALATION ONCE
Refills: 0 | Status: DISCONTINUED | OUTPATIENT
Start: 2019-06-01 | End: 2019-06-01

## 2019-06-01 RX ADMIN — Medication 3 MILLILITER(S): at 09:33

## 2019-06-01 RX ADMIN — Medication 50 MILLIGRAM(S): at 05:36

## 2019-06-01 RX ADMIN — AMLODIPINE BESYLATE 5 MILLIGRAM(S): 2.5 TABLET ORAL at 05:36

## 2019-06-01 RX ADMIN — Medication 650 MILLIGRAM(S): at 04:49

## 2019-06-01 RX ADMIN — Medication 50 MILLIGRAM(S): at 21:24

## 2019-06-01 RX ADMIN — HEPARIN SODIUM 5000 UNIT(S): 5000 INJECTION INTRAVENOUS; SUBCUTANEOUS at 17:43

## 2019-06-01 RX ADMIN — ERYTHROPOIETIN 10000 UNIT(S): 10000 INJECTION, SOLUTION INTRAVENOUS; SUBCUTANEOUS at 12:32

## 2019-06-01 RX ADMIN — Medication 25 MILLIGRAM(S): at 05:36

## 2019-06-01 RX ADMIN — Medication 3 MILLILITER(S): at 20:17

## 2019-06-01 RX ADMIN — INSULIN GLARGINE 10 UNIT(S): 100 INJECTION, SOLUTION SUBCUTANEOUS at 22:18

## 2019-06-01 RX ADMIN — Medication 10 MILLILITER(S): at 02:17

## 2019-06-01 RX ADMIN — Medication 50 MILLIGRAM(S): at 16:56

## 2019-06-01 RX ADMIN — Medication 650 MILLIGRAM(S): at 05:42

## 2019-06-01 RX ADMIN — SIMVASTATIN 40 MILLIGRAM(S): 20 TABLET, FILM COATED ORAL at 21:24

## 2019-06-01 RX ADMIN — SEVELAMER CARBONATE 2400 MILLIGRAM(S): 2400 POWDER, FOR SUSPENSION ORAL at 16:56

## 2019-06-01 RX ADMIN — Medication 80 MILLIGRAM(S): at 05:36

## 2019-06-01 RX ADMIN — Medication 10 MILLILITER(S): at 21:24

## 2019-06-01 RX ADMIN — Medication 1: at 16:55

## 2019-06-01 RX ADMIN — PANTOPRAZOLE SODIUM 40 MILLIGRAM(S): 20 TABLET, DELAYED RELEASE ORAL at 05:36

## 2019-06-01 RX ADMIN — Medication 25 MILLIGRAM(S): at 17:44

## 2019-06-01 RX ADMIN — Medication 3 MILLILITER(S): at 02:08

## 2019-06-01 RX ADMIN — SEVELAMER CARBONATE 2400 MILLIGRAM(S): 2400 POWDER, FOR SUSPENSION ORAL at 08:14

## 2019-06-01 RX ADMIN — GABAPENTIN 300 MILLIGRAM(S): 400 CAPSULE ORAL at 21:24

## 2019-06-01 RX ADMIN — HEPARIN SODIUM 5000 UNIT(S): 5000 INJECTION INTRAVENOUS; SUBCUTANEOUS at 05:36

## 2019-06-01 NOTE — PROGRESS NOTE ADULT - ASSESSMENT
c/o some burning sensation in both eyes  no watery, no pus, acuity fair still c/o lt knee pain  denies cp or sob, cough,   bm okay  vsstable 99.6 tmax other vsstable   physical unchanged  eyes reactive to light, no discharged  no redness, acuity fair  lt knee has good movements  no redness, no swelling  when you talk to him no tenderness,  if you then tenderness  ortho to f/u  yesterday I d/w ID about MRI , he doesnt think is any need of antibx.  oob in chair   eyes  wash with regular water   hypoxia  sec to asthma, smoking ( possible copd), obesity, fluid overload  need O2  2lnc   dm, htn  hld  esrd ( hd ) cont same  morbid obesity needs sleep studies  high risk of sudden death  unless changes life style  good diet, some exercise. no etoh, smoker, drugs  controll dm htn, hld, and wt reduction.  i also have given him option of seing bariatric surgeon

## 2019-06-01 NOTE — PROGRESS NOTE ADULT - ASSESSMENT
#ESRD. HD today.  # anemia of CKD. procrit on HD  #HTN cont norvasc, hydralazine and metoprolol  # LT knee pain.  better  #renal osteodystrophy. cont sevelamer.  d/c planning

## 2019-06-01 NOTE — PROGRESS NOTE ADULT - SUBJECTIVE AND OBJECTIVE BOX
[facility] : Progress Note :: [phone], (office [phone]),   Dr La / Dr Hui / Dr Barber / Dr Villalobos / Dr Hang CASTELLANO / Dr Mehta / Dr Sanchez / Dr Alber dumont  _____________________________________________________________________________________________    leg pain better    aspirin (Short breath)  aspirin (Swelling)  penicillin (Short breath)  penicillins (Swelling)  shellfish (Unknown)    Hospital Medications:   MEDICATIONS  (STANDING):  ALBUTerol    90 MICROgram(s) HFA Inhaler 1 Puff(s) Inhalation every 4 hours  ALBUTerol/ipratropium for Nebulization 3 milliLiter(s) Nebulizer every 6 hours  amLODIPine   Tablet 5 milliGRAM(s) Oral daily  calcitriol   Capsule 0.25 MICROGram(s) Oral daily  chlorhexidine 4% Liquid 1 Application(s) Topical <User Schedule>  clopidogrel Tablet 75 milliGRAM(s) Oral daily  epoetin cyndie Injectable 78719 Unit(s) IV Push <User Schedule>  ferrous    sulfate 325 milliGRAM(s) Oral daily  furosemide    Tablet 80 milliGRAM(s) Oral daily  gabapentin 300 milliGRAM(s) Oral at bedtime  heparin  Injectable 5000 Unit(s) SubCutaneous every 12 hours  hydrALAZINE 50 milliGRAM(s) Oral three times a day  insulin glargine Injectable (LANTUS) 10 Unit(s) SubCutaneous at bedtime  insulin lispro (HumaLOG) corrective regimen sliding scale   SubCutaneous three times a day before meals  insulin lispro (HumaLOG) corrective regimen sliding scale   SubCutaneous at bedtime  metoprolol tartrate 25 milliGRAM(s) Oral two times a day  pantoprazole    Tablet 40 milliGRAM(s) Oral before breakfast  QUEtiapine 200 milliGRAM(s) Oral daily  sevelamer carbonate 2400 milliGRAM(s) Oral three times a day with meals  simvastatin 40 milliGRAM(s) Oral at bedtime  tiotropium 18 MICROgram(s) Capsule 1 Capsule(s) Inhalation daily      VITALS:  T(F): 99.6 (06-01-19 @ 05:20), Max: 99.6 (06-01-19 @ 05:20)  HR: 97 (06-01-19 @ 05:20)  BP: 122/67 (06-01-19 @ 05:20)  RR: 18 (06-01-19 @ 05:20)  SpO2: 97% (06-01-19 @ 05:20)  Wt(kg): --      PHYSICAL EXAM:  Constitutional: NAD  HEENT: anicteric sclera, oropharynx clear, MMM  Neck: No JVD  Respiratory: CTAB, no wheezes, rales or rhonchi  Cardiovascular: S1, S2, RRR  Gastrointestinal: BS+, soft, NT/ND  Extremities:  peripheral edema++  Neurological: A/O x 3, no focal deficitss  Vascular Access: LUEAVF with thrill and bruit    LABS:        Creatinine Trend: 10.50 <--, 13.20 <--    Urine Studies:      RADIOLOGY & ADDITIONAL STUDIES:

## 2019-06-01 NOTE — PROGRESS NOTE ADULT - SUBJECTIVE AND OBJECTIVE BOX
HPI:  39 Male with PMH of Type 2 DM, Migraine, cocaine abuse last dose week ago, ESRD on HD (left arm graft, M/W/F), Morbid obesity, HTN, Chr dyspnea (uses home O2 prn), Schizophrenia, Bacteremia due to foot cellulitis presented to ED c/o left knee pain for 2 days. Pain describes knee pain as sharp, 10/10 in intensity, non radiating, worse with movement, and unable to ambulate. . Pt reports that yesterday after riding in a car he was unable to get out secondary to severe pain in the L knee. Pt states that his PCP referred him to orthopedist Dr. Velazquez and he has a scheduled appointment on Anya 3. Pt relates an ACL injury when he was a teenager and denies any recent trauma. Pt endorses that his knee feels swollen. Patient also report of difficulty breathing since 1 day, he use his albuterol inhalers multiple times with no significant improvement in his symptoms. Patient missed his HD on Monday because he was unable to go to HD centre due to knee pain. patient denies fever, chills, headaches, chest pain, palpitation, nausea, vomiting, diarrhea, abdominal pain/distension, dysuria, skin rashes.  patient used cocaine last week    on admission pt was afebrile, hypertensive, O2 sat 100 on suppl O2  cbc showed no leucocytosis, Hb 8.8  bmp hyperkalemia K 5.5 No EKG changes  elevated BUN/cr   ABG - ( 29 May 2019 00:45 )  pH, Arterial: 7.32  pH, Blood: x     /  pCO2: 51    /  pO2: 223   / HCO3: 26    / Base Excess: -0.3  /  SaO2: 100     CT scan of knee Circumferential infiltration of the subcutaneous soft tissues about the left knee without evidence of a drainable collection. No areas of cortical destruction to suggest osteomyelitis. concern of cellulites  CXR showed congestion ++ (29 May 2019 03:53)      Patient is a 39y old  Male who presents with a chief complaint of Left knee pain (01 Jun 2019 09:38)      INTERVAL HPI/OVERNIGHT EVENTS:  T(C): 36.8 (06-01-19 @ 11:45), Max: 37.6 (06-01-19 @ 05:20)  HR: 85 (06-01-19 @ 11:45) (81 - 97)  BP: 141/78 (06-01-19 @ 11:45) (122/67 - 163/85)  RR: 18 (06-01-19 @ 11:45) (18 - 18)  SpO2: 97% (06-01-19 @ 05:20) (97% - 100%)  Wt(kg): --  I&O's Summary      REVIEW OF SYSTEMS: denies fever, chills, SOB, palpitations, chest pain, abdominal pain, nausea, vomitting, diarrhea, constipation, dizziness    MEDICATIONS  (STANDING):  ALBUTerol    90 MICROgram(s) HFA Inhaler 1 Puff(s) Inhalation every 4 hours  ALBUTerol/ipratropium for Nebulization 3 milliLiter(s) Nebulizer every 6 hours  amLODIPine   Tablet 5 milliGRAM(s) Oral daily  calcitriol   Capsule 0.25 MICROGram(s) Oral daily  chlorhexidine 4% Liquid 1 Application(s) Topical <User Schedule>  clopidogrel Tablet 75 milliGRAM(s) Oral daily  epoetin cyndie Injectable 20029 Unit(s) IV Push <User Schedule>  ferrous    sulfate 325 milliGRAM(s) Oral daily  furosemide    Tablet 80 milliGRAM(s) Oral daily  gabapentin 300 milliGRAM(s) Oral at bedtime  heparin  Injectable 5000 Unit(s) SubCutaneous every 12 hours  hydrALAZINE 50 milliGRAM(s) Oral three times a day  insulin glargine Injectable (LANTUS) 10 Unit(s) SubCutaneous at bedtime  insulin lispro (HumaLOG) corrective regimen sliding scale   SubCutaneous three times a day before meals  insulin lispro (HumaLOG) corrective regimen sliding scale   SubCutaneous at bedtime  metoprolol tartrate 25 milliGRAM(s) Oral two times a day  pantoprazole    Tablet 40 milliGRAM(s) Oral before breakfast  QUEtiapine 200 milliGRAM(s) Oral daily  sevelamer carbonate 2400 milliGRAM(s) Oral three times a day with meals  simvastatin 40 milliGRAM(s) Oral at bedtime  tiotropium 18 MICROgram(s) Capsule 1 Capsule(s) Inhalation daily    MEDICATIONS  (PRN):  acetaminophen   Tablet .. 650 milliGRAM(s) Oral every 6 hours PRN Moderate Pain (4 - 6)  guaiFENesin/dextromethorphan  Syrup 10 milliLiter(s) Oral every 6 hours PRN Cough      PHYSICAL EXAM:  GENERAL: NAD, well-groomed, well-developed  HEAD:  Atraumatic, Normocephalic  EYES: EOMI, PERRLA, conjunctiva and sclera clear  ENMT: No tonsillar erythema, exudates, or enlargement; Moist mucous membranes, Good dentition, No lesions  NECK: Supple, No JVD, Normal thyroid  NERVOUS SYSTEM:  Alert & Oriented X3, Good concentration; Motor Strength 5/5 B/L upper and lower extremities; DTRs 2+ intact and symmetric  CHEST/LUNG: Clear to percussion bilaterally; No rales, rhonchi, wheezing, or rubs  HEART: Regular rate and rhythm; No murmurs, rubs, or gallops  ABDOMEN: Soft, Nontender, Nondistended; Bowel sounds present  EXTREMITIES:  2+ Peripheral Pulses, No clubbing, cyanosis, or edema  LYMPH: No lymphadenopathy noted  SKIN: No rashes or lesions  LABS:              CAPILLARY BLOOD GLUCOSE      POCT Blood Glucose.: 112 mg/dL (01 Jun 2019 11:33)  POCT Blood Glucose.: 113 mg/dL (01 Jun 2019 07:54)  POCT Blood Glucose.: 137 mg/dL (31 May 2019 21:07)  POCT Blood Glucose.: 109 mg/dL (31 May 2019 16:33)

## 2019-06-02 LAB
GLUCOSE BLDC GLUCOMTR-MCNC: 101 MG/DL — HIGH (ref 70–99)
GLUCOSE BLDC GLUCOMTR-MCNC: 111 MG/DL — HIGH (ref 70–99)
GLUCOSE BLDC GLUCOMTR-MCNC: 113 MG/DL — HIGH (ref 70–99)
GLUCOSE BLDC GLUCOMTR-MCNC: 137 MG/DL — HIGH (ref 70–99)

## 2019-06-02 PROCEDURE — 71045 X-RAY EXAM CHEST 1 VIEW: CPT | Mod: 26

## 2019-06-02 RX ORDER — FUROSEMIDE 40 MG
40 TABLET ORAL ONCE
Refills: 0 | Status: COMPLETED | OUTPATIENT
Start: 2019-06-02 | End: 2019-06-02

## 2019-06-02 RX ORDER — IPRATROPIUM/ALBUTEROL SULFATE 18-103MCG
3 AEROSOL WITH ADAPTER (GRAM) INHALATION ONCE
Refills: 0 | Status: COMPLETED | OUTPATIENT
Start: 2019-06-02 | End: 2019-06-02

## 2019-06-02 RX ADMIN — CHLORHEXIDINE GLUCONATE 1 APPLICATION(S): 213 SOLUTION TOPICAL at 06:29

## 2019-06-02 RX ADMIN — QUETIAPINE FUMARATE 200 MILLIGRAM(S): 200 TABLET, FILM COATED ORAL at 13:11

## 2019-06-02 RX ADMIN — SEVELAMER CARBONATE 2400 MILLIGRAM(S): 2400 POWDER, FOR SUSPENSION ORAL at 17:52

## 2019-06-02 RX ADMIN — Medication 325 MILLIGRAM(S): at 13:11

## 2019-06-02 RX ADMIN — CALCITRIOL 0.25 MICROGRAM(S): 0.5 CAPSULE ORAL at 13:11

## 2019-06-02 RX ADMIN — Medication 50 MILLIGRAM(S): at 06:29

## 2019-06-02 RX ADMIN — HEPARIN SODIUM 5000 UNIT(S): 5000 INJECTION INTRAVENOUS; SUBCUTANEOUS at 06:32

## 2019-06-02 RX ADMIN — AMLODIPINE BESYLATE 5 MILLIGRAM(S): 2.5 TABLET ORAL at 06:29

## 2019-06-02 RX ADMIN — HEPARIN SODIUM 5000 UNIT(S): 5000 INJECTION INTRAVENOUS; SUBCUTANEOUS at 17:52

## 2019-06-02 RX ADMIN — CLOPIDOGREL BISULFATE 75 MILLIGRAM(S): 75 TABLET, FILM COATED ORAL at 13:11

## 2019-06-02 RX ADMIN — GABAPENTIN 300 MILLIGRAM(S): 400 CAPSULE ORAL at 21:55

## 2019-06-02 RX ADMIN — Medication 50 MILLIGRAM(S): at 15:12

## 2019-06-02 RX ADMIN — SEVELAMER CARBONATE 2400 MILLIGRAM(S): 2400 POWDER, FOR SUSPENSION ORAL at 13:11

## 2019-06-02 RX ADMIN — Medication 3 MILLILITER(S): at 03:04

## 2019-06-02 RX ADMIN — SEVELAMER CARBONATE 2400 MILLIGRAM(S): 2400 POWDER, FOR SUSPENSION ORAL at 09:21

## 2019-06-02 RX ADMIN — Medication 25 MILLIGRAM(S): at 06:29

## 2019-06-02 RX ADMIN — INSULIN GLARGINE 10 UNIT(S): 100 INJECTION, SOLUTION SUBCUTANEOUS at 21:55

## 2019-06-02 RX ADMIN — Medication 3 MILLILITER(S): at 13:17

## 2019-06-02 RX ADMIN — Medication 3 MILLILITER(S): at 21:17

## 2019-06-02 RX ADMIN — SIMVASTATIN 40 MILLIGRAM(S): 20 TABLET, FILM COATED ORAL at 21:55

## 2019-06-02 RX ADMIN — Medication 50 MILLIGRAM(S): at 21:55

## 2019-06-02 RX ADMIN — PANTOPRAZOLE SODIUM 40 MILLIGRAM(S): 20 TABLET, DELAYED RELEASE ORAL at 06:29

## 2019-06-02 RX ADMIN — Medication 3 MILLILITER(S): at 08:48

## 2019-06-02 RX ADMIN — Medication 25 MILLIGRAM(S): at 17:52

## 2019-06-02 RX ADMIN — Medication 80 MILLIGRAM(S): at 06:29

## 2019-06-02 RX ADMIN — Medication 10 MILLILITER(S): at 09:21

## 2019-06-02 RX ADMIN — Medication 40 MILLIGRAM(S): at 09:21

## 2019-06-02 NOTE — CHART NOTE - NSCHARTNOTEFT_GEN_A_CORE
f/u , per report, pt c/o sob overnight.   Seen and examined, c/o wheezing and inability to sleep. Appears in no acute disress.     OBJECTIVE:  Vital Signs Last 24 Hrs  T(C): 36.7 (02 Jun 2019 05:15), Max: 37 (01 Jun 2019 20:56)  T(F): 98 (02 Jun 2019 05:15), Max: 98.6 (01 Jun 2019 20:56)  HR: 91 (02 Jun 2019 05:15) (81 - 96)  BP: 159/96 (02 Jun 2019 05:15) (141/78 - 186/82)  BP(mean): --  RR: 18 (02 Jun 2019 05:15) (17 - 20)  SpO2: 98% (02 Jun 2019 05:15) (97% - 98%)    LABS:    no labs available today          EKG:   IMGAGING:  < from: Xray Chest 1 View- PORTABLE-Urgent (06.02.19 @ 05:05) >    IMPRESSION: Somewhat increasing CHF.    < end of copied text >        Ros: denies cp, palpitatations. Speaking in full sentences, no use of accessory muscles.     PHYSICAL EXAM:  NAD, axox4  Neck no jvd  Heart S1S2 RRR  Chest, mild expiratory wheezes at bases, no rhonchi  Abd, large soft nt nd  Extr- 1+ edema          HPI:  39 Male with PMH of Type 2 DM, Migraine, cocaine abuse last dose week ago, ESRD on HD (left arm graft, M/W/F), Morbid obesity, HTN, Chr dyspnea (uses home O2 prn), Schizophrenia, Bacteremia due to foot cellulitis presented to ED c/o left knee pain for 2 days. Pain describes knee pain as sharp, 10/10 in intensity, non radiating, worse with movement, and unable to ambulate. . Pt reports that yesterday after riding in a car he was unable to get out secondary to severe pain in the L knee. Pt states that his PCP referred him to orthopedist Dr. Velazquez and he has a scheduled appointment on Anya 3. Pt relates an ACL injury when he was a teenager and denies any recent trauma. Pt endorses that his knee feels swollen. Patient also report of difficulty breathing since 1 day, he use his albuterol inhalers multiple times with no significant improvement in his symptoms. Patient missed his HD on Monday because he was unable to go to HD centre due to knee pain. patient denies fever, chills, headaches, chest pain, palpitation, nausea, vomiting, diarrhea, abdominal pain/distension, dysuria, skin rashes.  patient used cocaine last week    on admission pt was afebrile, hypertensive, O2 sat 100 on suppl O2  cbc showed no leucocytosis, Hb 8.8  bmp hyperkalemia K 5.5 No EKG changes  elevated BUN/cr   ABG - ( 29 May 2019 00:45 )  pH, Arterial: 7.32  pH, Blood: x     /  pCO2: 51    /  pO2: 223   / HCO3: 26    / Base Excess: -0.3  /  SaO2: 100     CT scan of knee Circumferential infiltration of the subcutaneous soft tissues about the left knee without evidence of a drainable collection. No areas of cortical destruction to suggest osteomyelitis. concern of cellulites  CXR showed congestion ++ (29 May 2019 03:53)      PLAN:   1. acute on chronic dyspnea, likely 2/2 fluid overload vs chronic restrictive disease (per pulmonary notes based on PFTs)  - cxr noted  - will give 1 dose extra lasix  - duonebs 39 Male with PMH of Type 2 DM, Migraine, cocaine abuse last dose week ago, ESRD on HD (left arm graft, M/W/F), Morbid obesity, HTN, Chr dyspnea (uses home O2 prn), Schizophrenia, Bacteremia due to foot cellulitis presented to ED c/o left knee pain for 2 days.   f/u , per report, pt c/o sob overnight.   Seen and examined, c/o wheezing and inability to sleep. Appears in no acute distress.       OBJECTIVE:  Vital Signs Last 24 Hrs  T(C): 36.7 (02 Jun 2019 05:15), Max: 37 (01 Jun 2019 20:56)  T(F): 98 (02 Jun 2019 05:15), Max: 98.6 (01 Jun 2019 20:56)  HR: 91 (02 Jun 2019 05:15) (81 - 96)  BP: 159/96 (02 Jun 2019 05:15) (141/78 - 186/82)  BP(mean): --  RR: 18 (02 Jun 2019 05:15) (17 - 20)  SpO2: 98% (02 Jun 2019 05:15) (97% - 98%)    LABS:    no labs available today          EKG:   IMAGING:  < from: Xray Chest 1 View- PORTABLE-Urgent (06.02.19 @ 05:05) >    IMPRESSION: Somewhat increasing CHF.          Ros: denies cp, palpitatations. Speaking in full sentences, no use of accessory muscles.     PHYSICAL EXAM:  NAD, axox4  Neck no jvd  Heart S1S2 RRR  Chest, mild expiratory wheezes at bases, no rhonchi  Abd, large soft nt nd  Extr- 1+ edema                PLAN:   1. acute on chronic dyspnea, likely 2/2 fluid overload vs chronic restrictive disease (per pulmonary notes based on PFTs)  - cxr noted  - will give 1 dose extra lasix  - duonebs

## 2019-06-02 NOTE — CHART NOTE - NSCHARTNOTEFT_GEN_A_CORE
EVENT:   Patient c/o dyspnea and wheezing.  OBJECTIVE: Evaluated at bedside. Expiratory wheezing.  Vital Signs Last 24 Hrs  T(C): 36.7 (02 Jun 2019 05:15), Max: 37 (01 Jun 2019 20:56)  T(F): 98 (02 Jun 2019 05:15), Max: 98.6 (01 Jun 2019 20:56)  HR: 91 (02 Jun 2019 05:15) (81 - 96)  BP: 159/96 (02 Jun 2019 05:15) (141/78 - 186/82)  BP(mean): --  RR: 18 (02 Jun 2019 05:15) (17 - 20)  SpO2: 98% (02 Jun 2019 05:15) (97% - 98%)    FOCUSED PHYSICAL EXAM:    LABS:            EKG:   IMAGING:    ASSESSMENT:  HPI:  39 Male with PMH of Type 2 DM, Migraine, cocaine abuse last dose week ago, ESRD on HD (left arm graft, M/W/F), Morbid obesity, HTN, Chr dyspnea (uses home O2 prn), Schizophrenia, Bacteremia due to foot cellulitis presented to ED c/o left knee pain for 2 days. Pain describes knee pain as sharp, 10/10 in intensity, non radiating, worse with movement, and unable to ambulate. . Pt reports that yesterday after riding in a car he was unable to get out secondary to severe pain in the L knee. Pt states that his PCP referred him to orthopedist Dr. Velazquez and he has a scheduled appointment on Anya 3. Pt relates an ACL injury when he was a teenager and denies any recent trauma. Pt endorses that his knee feels swollen. Patient also report of difficulty breathing since 1 day, he use his albuterol inhalers multiple times with no significant improvement in his symptoms. Patient missed his HD on Monday because he was unable to go to HD centre due to knee pain. patient denies fever, chills, headaches, chest pain, palpitation, nausea, vomiting, diarrhea, abdominal pain/distension, dysuria, skin rashes.  patient used cocaine last week    on admission pt was afebrile, hypertensive, O2 sat 100 on suppl O2  cbc showed no leucocytosis, Hb 8.8  bmp hyperkalemia K 5.5 No EKG changes  elevated BUN/cr   ABG - ( 29 May 2019 00:45 )  pH, Arterial: 7.32  pH, Blood: x     /  pCO2: 51    /  pO2: 223   / HCO3: 26    / Base Excess: -0.3  /  SaO2: 100     CT scan of knee Circumferential infiltration of the subcutaneous soft tissues about the left knee without evidence of a drainable collection. No areas of cortical destruction to suggest osteomyelitis. concern of cellulites  CXR showed congestion ++ (29 May 2019 03:53)      PLAN: Stat Duoneb 3 ml tx. neb x1 dose ordered. Chest x ray - done. Arterial ABG - pending.     FOLLOW UP / RESULT:

## 2019-06-03 ENCOUNTER — APPOINTMENT (OUTPATIENT)
Dept: ORTHOPEDIC SURGERY | Facility: CLINIC | Age: 39
End: 2019-06-03

## 2019-06-03 DIAGNOSIS — Z87.891 PERSONAL HISTORY OF NICOTINE DEPENDENCE: ICD-10-CM

## 2019-06-03 DIAGNOSIS — N18.6 END STAGE RENAL DISEASE: ICD-10-CM

## 2019-06-03 DIAGNOSIS — J44.9 CHRONIC OBSTRUCTIVE PULMONARY DISEASE, UNSPECIFIED: ICD-10-CM

## 2019-06-03 LAB
ANION GAP SERPL CALC-SCNC: 9 MMOL/L — SIGNIFICANT CHANGE UP (ref 5–17)
BUN SERPL-MCNC: 65 MG/DL — HIGH (ref 7–18)
CALCIUM SERPL-MCNC: 8.2 MG/DL — LOW (ref 8.4–10.5)
CHLORIDE SERPL-SCNC: 99 MMOL/L — SIGNIFICANT CHANGE UP (ref 96–108)
CO2 SERPL-SCNC: 28 MMOL/L — SIGNIFICANT CHANGE UP (ref 22–31)
CREAT SERPL-MCNC: 13 MG/DL — HIGH (ref 0.5–1.3)
CULTURE RESULTS: SIGNIFICANT CHANGE UP
CULTURE RESULTS: SIGNIFICANT CHANGE UP
GLUCOSE BLDC GLUCOMTR-MCNC: 126 MG/DL — HIGH (ref 70–99)
GLUCOSE BLDC GLUCOMTR-MCNC: 153 MG/DL — HIGH (ref 70–99)
GLUCOSE BLDC GLUCOMTR-MCNC: 154 MG/DL — HIGH (ref 70–99)
GLUCOSE SERPL-MCNC: 116 MG/DL — HIGH (ref 70–99)
HCT VFR BLD CALC: 26.9 % — LOW (ref 39–50)
HGB BLD-MCNC: 8.1 G/DL — LOW (ref 13–17)
MCHC RBC-ENTMCNC: 29.7 PG — SIGNIFICANT CHANGE UP (ref 27–34)
MCHC RBC-ENTMCNC: 30.1 GM/DL — LOW (ref 32–36)
MCV RBC AUTO: 98.5 FL — SIGNIFICANT CHANGE UP (ref 80–100)
NRBC # BLD: 0 /100 WBCS — SIGNIFICANT CHANGE UP (ref 0–0)
PLATELET # BLD AUTO: 321 K/UL — SIGNIFICANT CHANGE UP (ref 150–400)
POTASSIUM SERPL-MCNC: 5.2 MMOL/L — SIGNIFICANT CHANGE UP (ref 3.5–5.3)
POTASSIUM SERPL-SCNC: 5.2 MMOL/L — SIGNIFICANT CHANGE UP (ref 3.5–5.3)
RBC # BLD: 2.73 M/UL — LOW (ref 4.2–5.8)
RBC # FLD: 15.6 % — HIGH (ref 10.3–14.5)
SODIUM SERPL-SCNC: 136 MMOL/L — SIGNIFICANT CHANGE UP (ref 135–145)
SPECIMEN SOURCE: SIGNIFICANT CHANGE UP
SPECIMEN SOURCE: SIGNIFICANT CHANGE UP
WBC # BLD: 9.65 K/UL — SIGNIFICANT CHANGE UP (ref 3.8–10.5)
WBC # FLD AUTO: 9.65 K/UL — SIGNIFICANT CHANGE UP (ref 3.8–10.5)

## 2019-06-03 RX ADMIN — Medication 1: at 16:48

## 2019-06-03 RX ADMIN — SEVELAMER CARBONATE 2400 MILLIGRAM(S): 2400 POWDER, FOR SUSPENSION ORAL at 08:19

## 2019-06-03 RX ADMIN — Medication 3 MILLILITER(S): at 21:42

## 2019-06-03 RX ADMIN — SEVELAMER CARBONATE 2400 MILLIGRAM(S): 2400 POWDER, FOR SUSPENSION ORAL at 12:23

## 2019-06-03 RX ADMIN — GABAPENTIN 300 MILLIGRAM(S): 400 CAPSULE ORAL at 21:58

## 2019-06-03 RX ADMIN — Medication 50 MILLIGRAM(S): at 05:27

## 2019-06-03 RX ADMIN — PANTOPRAZOLE SODIUM 40 MILLIGRAM(S): 20 TABLET, DELAYED RELEASE ORAL at 07:33

## 2019-06-03 RX ADMIN — CHLORHEXIDINE GLUCONATE 1 APPLICATION(S): 213 SOLUTION TOPICAL at 05:27

## 2019-06-03 RX ADMIN — HEPARIN SODIUM 5000 UNIT(S): 5000 INJECTION INTRAVENOUS; SUBCUTANEOUS at 17:24

## 2019-06-03 RX ADMIN — Medication 80 MILLIGRAM(S): at 05:27

## 2019-06-03 RX ADMIN — Medication 325 MILLIGRAM(S): at 12:22

## 2019-06-03 RX ADMIN — Medication 10 MILLILITER(S): at 04:50

## 2019-06-03 RX ADMIN — Medication 3 MILLILITER(S): at 14:06

## 2019-06-03 RX ADMIN — Medication 25 MILLIGRAM(S): at 05:27

## 2019-06-03 RX ADMIN — Medication 25 MILLIGRAM(S): at 17:25

## 2019-06-03 RX ADMIN — SEVELAMER CARBONATE 2400 MILLIGRAM(S): 2400 POWDER, FOR SUSPENSION ORAL at 16:50

## 2019-06-03 RX ADMIN — Medication 50 MILLIGRAM(S): at 21:58

## 2019-06-03 RX ADMIN — SIMVASTATIN 40 MILLIGRAM(S): 20 TABLET, FILM COATED ORAL at 21:59

## 2019-06-03 RX ADMIN — Medication 3 MILLILITER(S): at 08:11

## 2019-06-03 RX ADMIN — Medication 50 MILLIGRAM(S): at 13:27

## 2019-06-03 RX ADMIN — CALCITRIOL 0.25 MICROGRAM(S): 0.5 CAPSULE ORAL at 12:22

## 2019-06-03 RX ADMIN — CLOPIDOGREL BISULFATE 75 MILLIGRAM(S): 75 TABLET, FILM COATED ORAL at 12:22

## 2019-06-03 RX ADMIN — Medication 3 MILLILITER(S): at 04:00

## 2019-06-03 RX ADMIN — INSULIN GLARGINE 10 UNIT(S): 100 INJECTION, SOLUTION SUBCUTANEOUS at 21:58

## 2019-06-03 RX ADMIN — HEPARIN SODIUM 5000 UNIT(S): 5000 INJECTION INTRAVENOUS; SUBCUTANEOUS at 05:28

## 2019-06-03 RX ADMIN — QUETIAPINE FUMARATE 200 MILLIGRAM(S): 200 TABLET, FILM COATED ORAL at 12:22

## 2019-06-03 RX ADMIN — AMLODIPINE BESYLATE 5 MILLIGRAM(S): 2.5 TABLET ORAL at 05:27

## 2019-06-03 NOTE — PROGRESS NOTE ADULT - ASSESSMENT
#ESRD. HD in AM.  # anemia of CKD. procrit on HD  #HTN cont norvasc, hydralazine and metoprolol  # LT knee pain.  better  #renal osteodystrophy. cont sevelamer.  # it would be challenge to have home oxygen as just stopped smoked  d/c planning

## 2019-06-03 NOTE — PROGRESS NOTE ADULT - SUBJECTIVE AND OBJECTIVE BOX
[facility] : Progress Note :: [phone], (office [phone]),   Dr La / Dr Hui / Dr Barber / Dr Villalobos / Dr Hang CASTELLANO / Dr Mehta / Dr Sanchez / Dr Alber dumont  _____________________________________________________________________________________________    hypoxia, requiring oxygen. quit smoking a month ago.    aspirin (Short breath)  aspirin (Swelling)  penicillin (Short breath)  penicillins (Swelling)  shellfish (Unknown)    Hospital Medications:   MEDICATIONS  (STANDING):  ALBUTerol    90 MICROgram(s) HFA Inhaler 1 Puff(s) Inhalation every 4 hours  ALBUTerol/ipratropium for Nebulization 3 milliLiter(s) Nebulizer every 6 hours  amLODIPine   Tablet 5 milliGRAM(s) Oral daily  calcitriol   Capsule 0.25 MICROGram(s) Oral daily  chlorhexidine 4% Liquid 1 Application(s) Topical <User Schedule>  clopidogrel Tablet 75 milliGRAM(s) Oral daily  ferrous    sulfate 325 milliGRAM(s) Oral daily  furosemide    Tablet 80 milliGRAM(s) Oral daily  gabapentin 300 milliGRAM(s) Oral at bedtime  heparin  Injectable 5000 Unit(s) SubCutaneous every 12 hours  hydrALAZINE 50 milliGRAM(s) Oral three times a day  insulin glargine Injectable (LANTUS) 10 Unit(s) SubCutaneous at bedtime  insulin lispro (HumaLOG) corrective regimen sliding scale   SubCutaneous three times a day before meals  insulin lispro (HumaLOG) corrective regimen sliding scale   SubCutaneous at bedtime  metoprolol tartrate 25 milliGRAM(s) Oral two times a day  pantoprazole    Tablet 40 milliGRAM(s) Oral before breakfast  QUEtiapine 200 milliGRAM(s) Oral daily  sevelamer carbonate 2400 milliGRAM(s) Oral three times a day with meals  simvastatin 40 milliGRAM(s) Oral at bedtime  tiotropium 18 MICROgram(s) Capsule 1 Capsule(s) Inhalation daily        VITALS:  T(F): 98.4 (06-03-19 @ 12:18), Max: 98.4 (06-03-19 @ 12:18)  HR: 89 (06-03-19 @ 14:20)  BP: 160/90 (06-03-19 @ 12:18)  RR: 19 (06-03-19 @ 12:18)  SpO2: 96% (06-03-19 @ 14:20)  Wt(kg): --    06-02 @ 07:01  -  06-03 @ 07:00  --------------------------------------------------------  IN: 0 mL / OUT: 600 mL / NET: -600 mL        PHYSICAL EXAM:  Constitutional: NAD  HEENT: anicteric sclera, oropharynx clear.  Neck: No JVD  Respiratory: bilateral rales, wheezeing, rales or rhonchi  Cardiovascular: S1, S2, RRR  Gastrointestinal: BS+, soft, NT/ND  Extremities:  peripheral edema++  : No CVA tenderness. No hernandez.   Skin: No rashes  Vascular Access: AVF with thrill and bruit++.    LABS:  06-03    136  |  99  |  65<H>  ----------------------------<  116<H>  5.2   |  28  |  13.00<H>    Ca    8.2<L>      03 Jun 2019 12:58      Creatinine Trend: 13.00 <--, 10.50 <--, 13.20 <--                        8.1    9.65  )-----------( 321      ( 03 Jun 2019 12:58 )             26.9     Urine Studies:      RADIOLOGY & ADDITIONAL STUDIES:

## 2019-06-03 NOTE — PROGRESS NOTE ADULT - PROBLEM SELECTOR PLAN 2
On rest, pt saturated 96%.   On ambulation, pt desaturated to 86% off 02. Improved to 94% with 4L nasal cannula  Pt will require oxygen at home

## 2019-06-03 NOTE — PROGRESS NOTE ADULT - ASSESSMENT
40 y/o morbidly obese M PMH Type 2 DM, ESRD on HD (left arm graft, MWF), HTN, Asthma, Schizophrenia, Bacteremia due to Foot cellulitis presented to ED c/o worsening dyspnea w/ productive cough x 1 day - admitted for asthma exacerbation likely 2/2 continued smoking.

## 2019-06-03 NOTE — PROGRESS NOTE ADULT - ASSESSMENT
kenny gibbons examined  comfortable on edge of bed  not in any distress   not in any resp distress.  not in any pain.  vs stable  afebrile physical unchanged   has afew b/l rhonci.  friday while walking with PT  pulse ox went down under 86   today was 93  but when i walked him pulse ox was again 85-86 on RA.  on bed on 2 lnc pulse ox 96 -97  without NC 90-94.  pt is smoker ,  copd,  Obesiti  BMI over 45 makes also restrictive lung disease ( d/w pulmonary)  possible has sleep apnea syndrome  went to [place] for sleep studies, asper Dr andino studies was not completed as pt was not able to sleep.  needs O2  AND IN FUTURE bIPPAP  KNEE  NOT MUCH PAIN/TENDERNESS  a/p dm, htn  morbid obesity  advised Bariatric eval as out pt  esrd on hd

## 2019-06-03 NOTE — PROGRESS NOTE ADULT - SUBJECTIVE AND OBJECTIVE BOX
NP Note discussed with  Primary Attending    Patient is a 39y old  Male who presents with a chief complaint of Left knee pain (03 Jun 2019 12:30)    INTERVAL HPI/OVERNIGHT EVENTS: still complaining of left knee pain     MEDICATIONS  (STANDING):  ALBUTerol    90 MICROgram(s) HFA Inhaler 1 Puff(s) Inhalation every 4 hours  ALBUTerol/ipratropium for Nebulization 3 milliLiter(s) Nebulizer every 6 hours  amLODIPine   Tablet 5 milliGRAM(s) Oral daily  calcitriol   Capsule 0.25 MICROGram(s) Oral daily  chlorhexidine 4% Liquid 1 Application(s) Topical <User Schedule>  clopidogrel Tablet 75 milliGRAM(s) Oral daily  epoetin cyndie Injectable 29140 Unit(s) IV Push <User Schedule>  ferrous    sulfate 325 milliGRAM(s) Oral daily  furosemide    Tablet 80 milliGRAM(s) Oral daily  gabapentin 300 milliGRAM(s) Oral at bedtime  heparin  Injectable 5000 Unit(s) SubCutaneous every 12 hours  hydrALAZINE 50 milliGRAM(s) Oral three times a day  insulin glargine Injectable (LANTUS) 10 Unit(s) SubCutaneous at bedtime  insulin lispro (HumaLOG) corrective regimen sliding scale   SubCutaneous three times a day before meals  insulin lispro (HumaLOG) corrective regimen sliding scale   SubCutaneous at bedtime  metoprolol tartrate 25 milliGRAM(s) Oral two times a day  pantoprazole    Tablet 40 milliGRAM(s) Oral before breakfast  QUEtiapine 200 milliGRAM(s) Oral daily  sevelamer carbonate 2400 milliGRAM(s) Oral three times a day with meals  simvastatin 40 milliGRAM(s) Oral at bedtime  tiotropium 18 MICROgram(s) Capsule 1 Capsule(s) Inhalation daily    MEDICATIONS  (PRN):  acetaminophen   Tablet .. 650 milliGRAM(s) Oral every 6 hours PRN Moderate Pain (4 - 6)  guaiFENesin/dextromethorphan  Syrup 10 milliLiter(s) Oral every 6 hours PRN Cough      __________________________________________________  REVIEW OF SYSTEMS:    CONSTITUTIONAL: No fever,   EYES: no acute visual disturbances  NECK: No pain or stiffness  RESPIRATORY: No cough; No shortness of breath  CARDIOVASCULAR: No chest pain, no palpitations  GASTROINTESTINAL: No pain. No nausea or vomiting; No diarrhea   NEUROLOGICAL: No headache or numbness, no tremors  MUSCULOSKELETAL: No joint pain, no muscle pain  GENITOURINARY: no dysuria, no frequency, no hesitancy  PSYCHIATRY: no depression , no anxiety  ALL OTHER  ROS negative        Vital Signs Last 24 Hrs  T(C): 36.9 (03 Jun 2019 12:18), Max: 36.9 (03 Jun 2019 12:18)  T(F): 98.4 (03 Jun 2019 12:18), Max: 98.4 (03 Jun 2019 12:18)  HR: 89 (03 Jun 2019 14:20) (83 - 96)  BP: 160/90 (03 Jun 2019 12:18) (150/87 - 177/99)  BP(mean): --  RR: 19 (03 Jun 2019 12:18) (19 - 20)  SpO2: 96% (03 Jun 2019 14:20) (96% - 100%)    ________________________________________________  PHYSICAL EXAM:  GENERAL: Obese  HEENT: Normocephalic;  conjunctivae and sclerae clear; moist mucous membranes;   NECK : supple  CHEST/LUNG: Clear to auscultation bilaterally with good air entry   HEART: S1 S2  regular; no murmurs, gallops or rubs  ABDOMEN: Soft, Nontender, Nondistended; Bowel sounds present  EXTREMITIES: +2 lower extremity edema   SKIN: warm and dry; no rash  NERVOUS SYSTEM:  Awake and alert; Oriented  to place, person and time ; no new deficits    _________________________________________________  LABS:                        8.1    9.65  )-----------( 321      ( 03 Jun 2019 12:58 )             26.9     06-03    136  |  99  |  65<H>  ----------------------------<  116<H>  5.2   |  28  |  13.00<H>    Ca    8.2<L>      03 Jun 2019 12:58      CAPILLARY BLOOD GLUCOSE    POCT Blood Glucose.: 126 mg/dL (03 Jun 2019 08:11)  POCT Blood Glucose.: 101 mg/dL (02 Jun 2019 21:39)  POCT Blood Glucose.: 111 mg/dL (02 Jun 2019 16:41)    RADIOLOGY & ADDITIONAL TESTS:    Imaging  Reviewed:  YES    Consultant(s) Notes Reviewed:   YES      Plan of care was discussed with patient and /or primary care giver; all questions and concerns were addressed

## 2019-06-03 NOTE — PROGRESS NOTE ADULT - SUBJECTIVE AND OBJECTIVE BOX
HPI:  39 Male with PMH of Type 2 DM, Migraine, cocaine abuse last dose week ago, ESRD on HD (left arm graft, M/W/F), Morbid obesity, HTN, Chr dyspnea (uses home O2 prn), Schizophrenia, Bacteremia due to foot cellulitis presented to ED c/o left knee pain for 2 days. Pain describes knee pain as sharp, 10/10 in intensity, non radiating, worse with movement, and unable to ambulate. . Pt reports that yesterday after riding in a car he was unable to get out secondary to severe pain in the L knee. Pt states that his PCP referred him to orthopedist Dr. Velazquez and he has a scheduled appointment on Anya 3. Pt relates an ACL injury when he was a teenager and denies any recent trauma. Pt endorses that his knee feels swollen. Patient also report of difficulty breathing since 1 day, he use his albuterol inhalers multiple times with no significant improvement in his symptoms. Patient missed his HD on Monday because he was unable to go to HD centre due to knee pain. patient denies fever, chills, headaches, chest pain, palpitation, nausea, vomiting, diarrhea, abdominal pain/distension, dysuria, skin rashes.  patient used cocaine last week    on admission pt was afebrile, hypertensive, O2 sat 100 on suppl O2  cbc showed no leucocytosis, Hb 8.8  bmp hyperkalemia K 5.5 No EKG changes  elevated BUN/cr   ABG - ( 29 May 2019 00:45 )  pH, Arterial: 7.32  pH, Blood: x     /  pCO2: 51    /  pO2: 223   / HCO3: 26    / Base Excess: -0.3  /  SaO2: 100     CT scan of knee Circumferential infiltration of the subcutaneous soft tissues about the left knee without evidence of a drainable collection. No areas of cortical destruction to suggest osteomyelitis. concern of cellulites  CXR showed congestion ++ (29 May 2019 03:53)      Patient is a 39y old  Male who presents with a chief complaint of Left knee pain (01 Jun 2019 15:20)      INTERVAL HPI/OVERNIGHT EVENTS:  T(C): 36.9 (06-03-19 @ 12:18), Max: 36.9 (06-03-19 @ 12:18)  HR: 93 (06-03-19 @ 12:18) (79 - 96)  BP: 160/90 (06-03-19 @ 12:18) (150/87 - 177/99)  RR: 19 (06-03-19 @ 12:18) (19 - 20)  SpO2: 96% (06-03-19 @ 12:18) (96% - 100%)  Wt(kg): --  I&O's Summary    02 Jun 2019 07:01  -  03 Jun 2019 07:00  --------------------------------------------------------  IN: 0 mL / OUT: 600 mL / NET: -600 mL        REVIEW OF SYSTEMS: denies fever, chills, SOB, palpitations, chest pain, abdominal pain, nausea, vomitting, diarrhea, constipation, dizziness    MEDICATIONS  (STANDING):  ALBUTerol    90 MICROgram(s) HFA Inhaler 1 Puff(s) Inhalation every 4 hours  ALBUTerol/ipratropium for Nebulization 3 milliLiter(s) Nebulizer every 6 hours  amLODIPine   Tablet 5 milliGRAM(s) Oral daily  calcitriol   Capsule 0.25 MICROGram(s) Oral daily  chlorhexidine 4% Liquid 1 Application(s) Topical <User Schedule>  clopidogrel Tablet 75 milliGRAM(s) Oral daily  epoetin cyndie Injectable 00355 Unit(s) IV Push <User Schedule>  ferrous    sulfate 325 milliGRAM(s) Oral daily  furosemide    Tablet 80 milliGRAM(s) Oral daily  gabapentin 300 milliGRAM(s) Oral at bedtime  heparin  Injectable 5000 Unit(s) SubCutaneous every 12 hours  hydrALAZINE 50 milliGRAM(s) Oral three times a day  insulin glargine Injectable (LANTUS) 10 Unit(s) SubCutaneous at bedtime  insulin lispro (HumaLOG) corrective regimen sliding scale   SubCutaneous three times a day before meals  insulin lispro (HumaLOG) corrective regimen sliding scale   SubCutaneous at bedtime  metoprolol tartrate 25 milliGRAM(s) Oral two times a day  pantoprazole    Tablet 40 milliGRAM(s) Oral before breakfast  QUEtiapine 200 milliGRAM(s) Oral daily  sevelamer carbonate 2400 milliGRAM(s) Oral three times a day with meals  simvastatin 40 milliGRAM(s) Oral at bedtime  tiotropium 18 MICROgram(s) Capsule 1 Capsule(s) Inhalation daily    MEDICATIONS  (PRN):  acetaminophen   Tablet .. 650 milliGRAM(s) Oral every 6 hours PRN Moderate Pain (4 - 6)  guaiFENesin/dextromethorphan  Syrup 10 milliLiter(s) Oral every 6 hours PRN Cough      PHYSICAL EXAM:  GENERAL: NAD, well-groomed, well-developed  HEAD:  Atraumatic, Normocephalic  EYES: EOMI, PERRLA, conjunctiva and sclera clear  ENMT: No tonsillar erythema, exudates, or enlargement; Moist mucous membranes, Good dentition, No lesions  NECK: Supple, No JVD, Normal thyroid  NERVOUS SYSTEM:  Alert & Oriented X3, Good concentration; Motor Strength 5/5 B/L upper and lower extremities; DTRs 2+ intact and symmetric  CHEST/LUNG: Clear to percussion bilaterally; No rales, rhonchi, wheezing, or rubs  HEART: Regular rate and rhythm; No murmurs, rubs, or gallops  ABDOMEN: Soft, Nontender, Nondistended; Bowel sounds present  EXTREMITIES:  2+ Peripheral Pulses, No clubbing, cyanosis, or edema  LYMPH: No lymphadenopathy noted  SKIN: No rashes or lesions  LABS:              CAPILLARY BLOOD GLUCOSE      POCT Blood Glucose.: 126 mg/dL (03 Jun 2019 08:11)  POCT Blood Glucose.: 101 mg/dL (02 Jun 2019 21:39)  POCT Blood Glucose.: 111 mg/dL (02 Jun 2019 16:41)

## 2019-06-03 NOTE — CHART NOTE - NSCHARTNOTEFT_GEN_A_CORE
EVENT: SOB/CP: Notified by RN that patient is reporting CP and SOB. Patient received duoneb. EKG completed and NSR. Upon assessment of patient he is sleeping comfortably in bed with no signs of dyspnea/accessory muscle use. Due for HD today.     OBJECTIVE:  Vital Signs Last 24 Hrs  T(C): 36.7 (03 Jun 2019 05:10), Max: 36.7 (02 Jun 2019 21:30)  T(F): 98 (03 Jun 2019 05:10), Max: 98.1 (02 Jun 2019 21:30)  HR: 96 (03 Jun 2019 05:10) (79 - 96)  BP: 177/99 (03 Jun 2019 05:10) (150/87 - 177/99)  BP(mean): --  RR: 20 (03 Jun 2019 05:10) (20 - 20)  SpO2: 96% (03 Jun 2019 05:10) (96% - 100%)    FOCUSED PHYSICAL EXAM: NAD, Breathing unlaboured, no accessory muscle use noted       EKG: NSR  IMAGING:  < from: Xray Chest 1 View- PORTABLE-Urgent (06.02.19 @ 05:05) >    AM:  XR CHEST PORTABLE URGENT 1V                          PROCEDURE DATE:  06/02/2019      INTERPRETATION:  AP chest on June 2, 2019 at 4:35 AM. Patient is short of   breath. Patient has chronic pulmonary edema, hypertension, diabetes, and   end-stage renal disease.    Heart suggests enlargement.    There is a diffuse mild congestive picture in the lung fields somewhat   increased from May 28 of this year.    IMPRESSION: Somewhat increasing CHF    ASSESSMENT:  HPI:  39 Male with PMH of Type 2 DM, Migraine, cocaine abuse last dose week ago, ESRD on HD (left arm graft, M/W/F), Morbid obesity, HTN, Chr dyspnea (uses home O2 prn), Schizophrenia, Bacteremia due to foot cellulitis presented to ED c/o left knee pain for 2 days. Pain describes knee pain as sharp, 10/10 in intensity, non radiating, worse with movement, and unable to ambulate. . Pt reports that yesterday after riding in a car he was unable to get out secondary to severe pain in the L knee. Pt states that his PCP referred him to orthopedist Dr. Velazquez and he has a scheduled appointment on Anya 3. Pt relates an ACL injury when he was a teenager and denies any recent trauma. Pt endorses that his knee feels swollen. Patient also report of difficulty breathing since 1 day, he use his albuterol inhalers multiple times with no significant improvement in his symptoms. Patient missed his HD on Monday because he was unable to go to HD centre due to knee pain. patient denies fever, chills, headaches, chest pain, palpitation, nausea, vomiting, diarrhea, abdominal pain/distension, dysuria, skin rashes.  patient used cocaine last week    on admission pt was afebrile, hypertensive, O2 sat 100 on suppl O2  cbc showed no leucocytosis, Hb 8.8  bmp hyperkalemia K 5.5 No EKG changes  elevated BUN/cr   ABG - ( 29 May 2019 00:45 )  pH, Arterial: 7.32  pH, Blood: x     /  pCO2: 51    /  pO2: 223   / HCO3: 26    / Base Excess: -0.3  /  SaO2: 100     CT scan of knee Circumferential infiltration of the subcutaneous soft tissues about the left knee without evidence of a drainable collection. No areas of cortical destruction to suggest osteomyelitis. concern of cellulites  CXR showed congestion ++ (29 May 2019 03:53)      PLAN:   - HD today  - Continue nebs as ordered    FOLLOW UP / RESULT:

## 2019-06-03 NOTE — PROGRESS NOTE ADULT - PROBLEM SELECTOR PLAN 6
pt states he quit one month ago   concern for sending pt home with 02 if pt not committed to refraining from smoking

## 2019-06-04 DIAGNOSIS — I50.1 LEFT VENTRICULAR FAILURE, UNSPECIFIED: ICD-10-CM

## 2019-06-04 DIAGNOSIS — J96.21 ACUTE AND CHRONIC RESPIRATORY FAILURE WITH HYPOXIA: ICD-10-CM

## 2019-06-04 LAB
ANION GAP SERPL CALC-SCNC: 8 MMOL/L — SIGNIFICANT CHANGE UP (ref 5–17)
BASE EXCESS BLDA CALC-SCNC: 2.3 MMOL/L — HIGH (ref -2–2)
BLOOD GAS COMMENTS ARTERIAL: SIGNIFICANT CHANGE UP
BUN SERPL-MCNC: 75 MG/DL — HIGH (ref 7–18)
CALCIUM SERPL-MCNC: 7.9 MG/DL — LOW (ref 8.4–10.5)
CHLORIDE SERPL-SCNC: 99 MMOL/L — SIGNIFICANT CHANGE UP (ref 96–108)
CO2 SERPL-SCNC: 28 MMOL/L — SIGNIFICANT CHANGE UP (ref 22–31)
CREAT SERPL-MCNC: 14.1 MG/DL — HIGH (ref 0.5–1.3)
GLUCOSE BLDC GLUCOMTR-MCNC: 138 MG/DL — HIGH (ref 70–99)
GLUCOSE BLDC GLUCOMTR-MCNC: 148 MG/DL — HIGH (ref 70–99)
GLUCOSE BLDC GLUCOMTR-MCNC: 96 MG/DL — SIGNIFICANT CHANGE UP (ref 70–99)
GLUCOSE BLDC GLUCOMTR-MCNC: 97 MG/DL — SIGNIFICANT CHANGE UP (ref 70–99)
GLUCOSE SERPL-MCNC: 126 MG/DL — HIGH (ref 70–99)
HCO3 BLDA-SCNC: 28 MMOL/L — HIGH (ref 23–27)
HCT VFR BLD CALC: 25.4 % — LOW (ref 39–50)
HGB BLD-MCNC: 7.8 G/DL — LOW (ref 13–17)
HOROWITZ INDEX BLDA+IHG-RTO: 28 — SIGNIFICANT CHANGE UP
MCHC RBC-ENTMCNC: 30.1 PG — SIGNIFICANT CHANGE UP (ref 27–34)
MCHC RBC-ENTMCNC: 30.7 GM/DL — LOW (ref 32–36)
MCV RBC AUTO: 98.1 FL — SIGNIFICANT CHANGE UP (ref 80–100)
NRBC # BLD: 0 /100 WBCS — SIGNIFICANT CHANGE UP (ref 0–0)
PCO2 BLDA: 52 MMHG — HIGH (ref 32–46)
PH BLDA: 7.35 — SIGNIFICANT CHANGE UP (ref 7.35–7.45)
PLATELET # BLD AUTO: 288 K/UL — SIGNIFICANT CHANGE UP (ref 150–400)
PO2 BLDA: 92 MMHG — SIGNIFICANT CHANGE UP (ref 74–108)
POTASSIUM SERPL-MCNC: 6.3 MMOL/L — CRITICAL HIGH (ref 3.5–5.3)
POTASSIUM SERPL-SCNC: 6.3 MMOL/L — CRITICAL HIGH (ref 3.5–5.3)
RBC # BLD: 2.59 M/UL — LOW (ref 4.2–5.8)
RBC # FLD: 15.7 % — HIGH (ref 10.3–14.5)
SAO2 % BLDA: 97 % — HIGH (ref 92–96)
SODIUM SERPL-SCNC: 135 MMOL/L — SIGNIFICANT CHANGE UP (ref 135–145)
WBC # BLD: 9.77 K/UL — SIGNIFICANT CHANGE UP (ref 3.8–10.5)
WBC # FLD AUTO: 9.77 K/UL — SIGNIFICANT CHANGE UP (ref 3.8–10.5)

## 2019-06-04 RX ORDER — IPRATROPIUM/ALBUTEROL SULFATE 18-103MCG
3 AEROSOL WITH ADAPTER (GRAM) INHALATION ONCE
Refills: 0 | Status: COMPLETED | OUTPATIENT
Start: 2019-06-04 | End: 2019-06-04

## 2019-06-04 RX ADMIN — AMLODIPINE BESYLATE 5 MILLIGRAM(S): 2.5 TABLET ORAL at 06:10

## 2019-06-04 RX ADMIN — Medication 3 MILLILITER(S): at 08:06

## 2019-06-04 RX ADMIN — Medication 10 MILLILITER(S): at 19:24

## 2019-06-04 RX ADMIN — SEVELAMER CARBONATE 2400 MILLIGRAM(S): 2400 POWDER, FOR SUSPENSION ORAL at 08:02

## 2019-06-04 RX ADMIN — Medication 650 MILLIGRAM(S): at 23:23

## 2019-06-04 RX ADMIN — QUETIAPINE FUMARATE 200 MILLIGRAM(S): 200 TABLET, FILM COATED ORAL at 11:54

## 2019-06-04 RX ADMIN — CLOPIDOGREL BISULFATE 75 MILLIGRAM(S): 75 TABLET, FILM COATED ORAL at 11:54

## 2019-06-04 RX ADMIN — Medication 3 MILLILITER(S): at 03:48

## 2019-06-04 RX ADMIN — Medication 50 MILLIGRAM(S): at 21:35

## 2019-06-04 RX ADMIN — CHLORHEXIDINE GLUCONATE 1 APPLICATION(S): 213 SOLUTION TOPICAL at 06:09

## 2019-06-04 RX ADMIN — Medication 325 MILLIGRAM(S): at 11:54

## 2019-06-04 RX ADMIN — GABAPENTIN 300 MILLIGRAM(S): 400 CAPSULE ORAL at 21:35

## 2019-06-04 RX ADMIN — HEPARIN SODIUM 5000 UNIT(S): 5000 INJECTION INTRAVENOUS; SUBCUTANEOUS at 06:11

## 2019-06-04 RX ADMIN — Medication 80 MILLIGRAM(S): at 06:11

## 2019-06-04 RX ADMIN — Medication 25 MILLIGRAM(S): at 19:08

## 2019-06-04 RX ADMIN — Medication 3 MILLILITER(S): at 21:44

## 2019-06-04 RX ADMIN — Medication 650 MILLIGRAM(S): at 22:23

## 2019-06-04 RX ADMIN — SEVELAMER CARBONATE 2400 MILLIGRAM(S): 2400 POWDER, FOR SUSPENSION ORAL at 11:54

## 2019-06-04 RX ADMIN — Medication 3 MILLILITER(S): at 07:01

## 2019-06-04 RX ADMIN — INSULIN GLARGINE 10 UNIT(S): 100 INJECTION, SOLUTION SUBCUTANEOUS at 21:35

## 2019-06-04 RX ADMIN — Medication 50 MILLIGRAM(S): at 06:11

## 2019-06-04 RX ADMIN — PANTOPRAZOLE SODIUM 40 MILLIGRAM(S): 20 TABLET, DELAYED RELEASE ORAL at 06:10

## 2019-06-04 RX ADMIN — CALCITRIOL 0.25 MICROGRAM(S): 0.5 CAPSULE ORAL at 11:54

## 2019-06-04 RX ADMIN — ERYTHROPOIETIN 10000 UNIT(S): 10000 INJECTION, SOLUTION INTRAVENOUS; SUBCUTANEOUS at 16:25

## 2019-06-04 RX ADMIN — Medication 10 MILLILITER(S): at 08:01

## 2019-06-04 RX ADMIN — Medication 25 MILLIGRAM(S): at 06:11

## 2019-06-04 RX ADMIN — SEVELAMER CARBONATE 2400 MILLIGRAM(S): 2400 POWDER, FOR SUSPENSION ORAL at 19:06

## 2019-06-04 RX ADMIN — HEPARIN SODIUM 5000 UNIT(S): 5000 INJECTION INTRAVENOUS; SUBCUTANEOUS at 19:06

## 2019-06-04 RX ADMIN — SIMVASTATIN 40 MILLIGRAM(S): 20 TABLET, FILM COATED ORAL at 21:35

## 2019-06-04 NOTE — PROGRESS NOTE ADULT - ASSESSMENT
kenny nd eaxmiend  vs stable afebrile  on bed    no complaints    vsstable physical unchanged   labs noted hgb 8.1  on epogen out pt GI  a/p esrd on hd.  obesity possible pickwian syndrome    needs bipap, o2   although echo ok but has chf in cxr and on presentation.  pulm to f/u   will get abg on ra  hgb 8.1  GI out pt

## 2019-06-04 NOTE — CHART NOTE - NSCHARTNOTEFT_GEN_A_CORE
Upon Nutritional Assessment by the Registered Dietitian your patient was determined to meet criteria / has evidence of the following diagnosis/diagnoses:          [ ]  Mild Protein Calorie Malnutrition        [ ]  Moderate Protein Calorie Malnutrition        [ ] Severe Protein Calorie Malnutrition        [ ] Unspecified Protein Calorie Malnutrition        [ ] Underweight / BMI <19        [ X ] Morbid Obesity / BMI > 40      Findings as based on:  •  Comprehensive nutrition assessment and consultation  •  Calorie counts (nutrient intake analysis)  •  Food acceptance and intake status from observations by staff  •  Follow up  •  Patient education  •  Intervention secondary to interdisciplinary rounds  •   concerns      Treatment:    The following diet has been recommended: continue diet Rx as ordered, providing double protein food with meals as medically feasible       PROVIDER Section:     By signing this assessment you are acknowledging and agree with the diagnosis/diagnoses assigned by the Registered Dietitian    Comments:

## 2019-06-04 NOTE — DIETITIAN INITIAL EVALUATION ADULT. - NS AS NUTRI INTERV DISCHARGE
dietitian at out-pt dialysis center when medically ready to be discharged/Discharge and transfer to another nutrition professional

## 2019-06-04 NOTE — PROGRESS NOTE ADULT - SUBJECTIVE AND OBJECTIVE BOX
NP Note discussed with  Primary Attending    Patient is a 39y old  Male who presents with a chief complaint of Left knee pain (03 Jun 2019 12:30)    INTERVAL HPI/OVERNIGHT EVENTS: still complaining of left knee pain     MEDICATIONS  (STANDING):  ALBUTerol    90 MICROgram(s) HFA Inhaler 1 Puff(s) Inhalation every 4 hours  ALBUTerol/ipratropium for Nebulization 3 milliLiter(s) Nebulizer every 6 hours  amLODIPine   Tablet 5 milliGRAM(s) Oral daily  calcitriol   Capsule 0.25 MICROGram(s) Oral daily  chlorhexidine 4% Liquid 1 Application(s) Topical <User Schedule>  clopidogrel Tablet 75 milliGRAM(s) Oral daily  epoetin cyndie Injectable 37995 Unit(s) IV Push <User Schedule>  ferrous    sulfate 325 milliGRAM(s) Oral daily  furosemide    Tablet 80 milliGRAM(s) Oral daily  gabapentin 300 milliGRAM(s) Oral at bedtime  heparin  Injectable 5000 Unit(s) SubCutaneous every 12 hours  hydrALAZINE 50 milliGRAM(s) Oral three times a day  insulin glargine Injectable (LANTUS) 10 Unit(s) SubCutaneous at bedtime  insulin lispro (HumaLOG) corrective regimen sliding scale   SubCutaneous three times a day before meals  insulin lispro (HumaLOG) corrective regimen sliding scale   SubCutaneous at bedtime  metoprolol tartrate 25 milliGRAM(s) Oral two times a day  pantoprazole    Tablet 40 milliGRAM(s) Oral before breakfast  QUEtiapine 200 milliGRAM(s) Oral daily  sevelamer carbonate 2400 milliGRAM(s) Oral three times a day with meals  simvastatin 40 milliGRAM(s) Oral at bedtime  tiotropium 18 MICROgram(s) Capsule 1 Capsule(s) Inhalation daily    MEDICATIONS  (PRN):  acetaminophen   Tablet .. 650 milliGRAM(s) Oral every 6 hours PRN Moderate Pain (4 - 6)  guaiFENesin/dextromethorphan  Syrup 10 milliLiter(s) Oral every 6 hours PRN Cough      __________________________________________________  REVIEW OF SYSTEMS:    CONSTITUTIONAL: No fever,   EYES: no acute visual disturbances  NECK: No pain or stiffness  RESPIRATORY: No cough; No shortness of breath  CARDIOVASCULAR: No chest pain, no palpitations  GASTROINTESTINAL: No pain. No nausea or vomiting; No diarrhea   NEUROLOGICAL: No headache or numbness, no tremors  MUSCULOSKELETAL: No joint pain, no muscle pain  GENITOURINARY: no dysuria, no frequency, no hesitancy  PSYCHIATRY: no depression , no anxiety  ALL OTHER  ROS negative        Vital Signs Last 24 Hrs  T(C): 36.9 (03 Jun 2019 12:18), Max: 36.9 (03 Jun 2019 12:18)  T(F): 98.4 (03 Jun 2019 12:18), Max: 98.4 (03 Jun 2019 12:18)  HR: 89 (03 Jun 2019 14:20) (83 - 96)  BP: 160/90 (03 Jun 2019 12:18) (150/87 - 177/99)  BP(mean): --  RR: 19 (03 Jun 2019 12:18) (19 - 20)  SpO2: 96% (03 Jun 2019 14:20) (96% - 100%)    ________________________________________________  PHYSICAL EXAM:  GENERAL: Obese  HEENT: Normocephalic;  conjunctivae and sclerae clear; moist mucous membranes;   NECK : supple  CHEST/LUNG: Clear to auscultation bilaterally with good air entry   HEART: S1 S2  regular; no murmurs, gallops or rubs  ABDOMEN: Soft, Nontender, Nondistended; Bowel sounds present  EXTREMITIES: +2 lower extremity edema   SKIN: warm and dry; no rash  NERVOUS SYSTEM:  Awake and alert; Oriented  to place, person and time ; no new deficits    _________________________________________________  LABS:                        8.1    9.65  )-----------( 321      ( 03 Jun 2019 12:58 )             26.9     06-03    136  |  99  |  65<H>  ----------------------------<  116<H>  5.2   |  28  |  13.00<H>    Ca    8.2<L>      03 Jun 2019 12:58      CAPILLARY BLOOD GLUCOSE    POCT Blood Glucose.: 126 mg/dL (03 Jun 2019 08:11)  POCT Blood Glucose.: 101 mg/dL (02 Jun 2019 21:39)  POCT Blood Glucose.: 111 mg/dL (02 Jun 2019 16:41)    RADIOLOGY & ADDITIONAL TESTS:    Imaging  Reviewed:  YES    Consultant(s) Notes Reviewed:   YES      Plan of care was discussed with patient and /or primary care giver; all questions and concerns were addressed NP Note discussed with  Primary Attending    Patient is a 39y old  Male who presents with a chief complaint of Left knee pain (03 Jun 2019 12:30)    INTERVAL HPI/OVERNIGHT EVENTS: still complaining of left knee pain     MEDICATIONS  (STANDING):  ALBUTerol    90 MICROgram(s) HFA Inhaler 1 Puff(s) Inhalation every 4 hours  ALBUTerol/ipratropium for Nebulization 3 milliLiter(s) Nebulizer every 6 hours  amLODIPine   Tablet 5 milliGRAM(s) Oral daily  calcitriol   Capsule 0.25 MICROGram(s) Oral daily  chlorhexidine 4% Liquid 1 Application(s) Topical <User Schedule>  clopidogrel Tablet 75 milliGRAM(s) Oral daily  epoetin cyndie Injectable 19698 Unit(s) IV Push <User Schedule>  ferrous    sulfate 325 milliGRAM(s) Oral daily  furosemide    Tablet 80 milliGRAM(s) Oral daily  gabapentin 300 milliGRAM(s) Oral at bedtime  heparin  Injectable 5000 Unit(s) SubCutaneous every 12 hours  hydrALAZINE 50 milliGRAM(s) Oral three times a day  insulin glargine Injectable (LANTUS) 10 Unit(s) SubCutaneous at bedtime  insulin lispro (HumaLOG) corrective regimen sliding scale   SubCutaneous three times a day before meals  insulin lispro (HumaLOG) corrective regimen sliding scale   SubCutaneous at bedtime  metoprolol tartrate 25 milliGRAM(s) Oral two times a day  pantoprazole    Tablet 40 milliGRAM(s) Oral before breakfast  QUEtiapine 200 milliGRAM(s) Oral daily  sevelamer carbonate 2400 milliGRAM(s) Oral three times a day with meals  simvastatin 40 milliGRAM(s) Oral at bedtime  tiotropium 18 MICROgram(s) Capsule 1 Capsule(s) Inhalation daily    MEDICATIONS  (PRN):  acetaminophen   Tablet .. 650 milliGRAM(s) Oral every 6 hours PRN Moderate Pain (4 - 6)  guaiFENesin/dextromethorphan  Syrup 10 milliLiter(s) Oral every 6 hours PRN Cough      __________________________________________________  REVIEW OF SYSTEMS:    CONSTITUTION: (I want to go home  EYES: no acute visual disturbances  NECK: No pain or stiffness  RESPIRATORY: No cough; PIMENTEL  CARDIOVASCULAR: No chest pain, no palpitations  GASTROINTESTINAL: No pain. No nausea or vomiting; No diarrhea   NEUROLOGICAL: No headache or numbness, no tremors  MUSCULOSKELETAL: knee pain  GENITOURINARY: no dysuria, no frequency, no hesitancy  PSYCHIATRY: no depression , no anxiety  ALL OTHER  ROS negative        Vital Signs Last 24 Hrs  T(C): 36.9 (03 Jun 2019 12:18), Max: 36.9 (03 Jun 2019 12:18)  T(F): 98.4 (03 Jun 2019 12:18), Max: 98.4 (03 Jun 2019 12:18)  HR: 89 (03 Jun 2019 14:20) (83 - 96)  BP: 160/90 (03 Jun 2019 12:18) (150/87 - 177/99)  BP(mean): --  RR: 19 (03 Jun 2019 12:18) (19 - 20)  SpO2: 96% (03 Jun 2019 14:20) (96% - 100%)    ________________________________________________  PHYSICAL EXAM:  GENERAL: Obese  HEENT: Normocephalic;  conjunctivae and sclerae clear; moist mucous membranes;   NECK : supple  CHEST/LUNG: Clear to auscultation bilaterally  HEART: S1 S2  regular;   ABDOMEN: Soft, Nontender, Nondistended; Bowel sounds present  EXTREMITIES: +2 lower extremity edema   SKIN: warm and dry; no rash  NERVOUS SYSTEM:  Awake and alert; Oriented  to place, person and time     _________________________________________________  LABS:                        8.1    9.65  )-----------( 321      ( 03 Jun 2019 12:58 )             26.9     06-03    136  |  99  |  65<H>  ----------------------------<  116<H>  5.2   |  28  |  13.00<H>    Ca    8.2<L>      03 Jun 2019 12:58      CAPILLARY BLOOD GLUCOSE    POCT Blood Glucose.: 126 mg/dL (03 Jun 2019 08:11)  POCT Blood Glucose.: 101 mg/dL (02 Jun 2019 21:39)  POCT Blood Glucose.: 111 mg/dL (02 Jun 2019 16:41)    RADIOLOGY & ADDITIONAL TESTS:    Imaging  Reviewed:  YES    Consultant(s) Notes Reviewed:   YES      Plan of care was discussed with patient and /or primary care giver; all questions and concerns were addressed NP Note discussed with  Primary Attending    Patient is a 39y old  Male who presents with a chief complaint of Left knee pain (03 Jun 2019 12:30)    INTERVAL HPI/OVERNIGHT EVENTS: still complaining of left knee pain     MEDICATIONS  (STANDING):  ALBUTerol    90 MICROgram(s) HFA Inhaler 1 Puff(s) Inhalation every 4 hours  ALBUTerol/ipratropium for Nebulization 3 milliLiter(s) Nebulizer every 6 hours  amLODIPine   Tablet 5 milliGRAM(s) Oral daily  calcitriol   Capsule 0.25 MICROGram(s) Oral daily  chlorhexidine 4% Liquid 1 Application(s) Topical <User Schedule>  clopidogrel Tablet 75 milliGRAM(s) Oral daily  epoetin cyndie Injectable 49414 Unit(s) IV Push <User Schedule>  ferrous    sulfate 325 milliGRAM(s) Oral daily  furosemide    Tablet 80 milliGRAM(s) Oral daily  gabapentin 300 milliGRAM(s) Oral at bedtime  heparin  Injectable 5000 Unit(s) SubCutaneous every 12 hours  hydrALAZINE 50 milliGRAM(s) Oral three times a day  insulin glargine Injectable (LANTUS) 10 Unit(s) SubCutaneous at bedtime  insulin lispro (HumaLOG) corrective regimen sliding scale   SubCutaneous three times a day before meals  insulin lispro (HumaLOG) corrective regimen sliding scale   SubCutaneous at bedtime  metoprolol tartrate 25 milliGRAM(s) Oral two times a day  pantoprazole    Tablet 40 milliGRAM(s) Oral before breakfast  QUEtiapine 200 milliGRAM(s) Oral daily  sevelamer carbonate 2400 milliGRAM(s) Oral three times a day with meals  simvastatin 40 milliGRAM(s) Oral at bedtime  tiotropium 18 MICROgram(s) Capsule 1 Capsule(s) Inhalation daily    MEDICATIONS  (PRN):  acetaminophen   Tablet .. 650 milliGRAM(s) Oral every 6 hours PRN Moderate Pain (4 - 6)  guaiFENesin/dextromethorphan  Syrup 10 milliLiter(s) Oral every 6 hours PRN Cough      __________________________________________________  REVIEW OF SYSTEMS:    CONSTITUTION: (I want to go home  EYES: no acute visual disturbances  NECK: No pain or stiffness  RESPIRATORY: No cough; PIMENTEL  CARDIOVASCULAR: No chest pain, no palpitations  GASTROINTESTINAL: No pain. No nausea or vomiting; No diarrhea   NEUROLOGICAL: No headache or numbness, no tremors  MUSCULOSKELETAL: knee pain  GENITOURINARY: no dysuria, no frequency, no hesitancy  PSYCHIATRY: no depression , no anxiety  ALL OTHER  ROS negative        Vital Signs Last 24 Hrs  T(C): 36.9 (03 Jun 2019 12:18), Max: 36.9 (03 Jun 2019 12:18)  T(F): 98.4 (03 Jun 2019 12:18), Max: 98.4 (03 Jun 2019 12:18)  HR: 89 (03 Jun 2019 14:20) (83 - 96)  BP: 160/90 (03 Jun 2019 12:18) (150/87 - 177/99)  BP(mean): --  RR: 19 (03 Jun 2019 12:18) (19 - 20)  SpO2: 96% (03 Jun 2019 14:20) (96% - 100%)    ________________________________________________  PHYSICAL EXAM:  GENERAL: morbidly obese  HEENT: Normocephalic;  conjunctivae and sclerae clear; moist mucous membranes; Mallampati 4   NECK : supple, large  CHEST/LUNG: Diminished breath sounds bilateral bases  HEART: S1 S2  regular;   ABDOMEN: Obese, Soft, Nontender, Nondistended; Bowel sounds present  EXTREMITIES: +2 lower extremity edema   SKIN: warm and dry; no rash  NERVOUS SYSTEM:  Awake and alert; Oriented  to place, person and time     _________________________________________________  LABS:                        8.1    9.65  )-----------( 321      ( 03 Jun 2019 12:58 )             26.9     06-03    136  |  99  |  65<H>  ----------------------------<  116<H>  5.2   |  28  |  13.00<H>    Ca    8.2<L>      03 Jun 2019 12:58      CAPILLARY BLOOD GLUCOSE    POCT Blood Glucose.: 126 mg/dL (03 Jun 2019 08:11)  POCT Blood Glucose.: 101 mg/dL (02 Jun 2019 21:39)  POCT Blood Glucose.: 111 mg/dL (02 Jun 2019 16:41)    RADIOLOGY & ADDITIONAL TESTS:    Imaging  Reviewed:  YES  < from: Xray Chest 1 View- PORTABLE-Urgent (06.02.19 @ 05:05) >  There is a diffuse mild congestive picture in the lung fields somewhat   increased from May 28 of this year.    IMPRESSION: Somewhat increasing CHF.      < end of copied text >    Consultant(s) Notes Reviewed:   YES      Plan of care was discussed with patient and /or primary care giver; all questions and concerns were addressed NP Note discussed with  Primary Attending    Patient is a 39y old  Male who presents with a chief complaint of Left knee pain (03 Jun 2019 12:30)    INTERVAL HPI/OVERNIGHT EVENTS: still complaining of left knee pain     MEDICATIONS  (STANDING):  ALBUTerol    90 MICROgram(s) HFA Inhaler 1 Puff(s) Inhalation every 4 hours  ALBUTerol/ipratropium for Nebulization 3 milliLiter(s) Nebulizer every 6 hours  amLODIPine   Tablet 5 milliGRAM(s) Oral daily  calcitriol   Capsule 0.25 MICROGram(s) Oral daily  chlorhexidine 4% Liquid 1 Application(s) Topical <User Schedule>  clopidogrel Tablet 75 milliGRAM(s) Oral daily  epoetin cyndie Injectable 93500 Unit(s) IV Push <User Schedule>  ferrous    sulfate 325 milliGRAM(s) Oral daily  furosemide    Tablet 80 milliGRAM(s) Oral daily  gabapentin 300 milliGRAM(s) Oral at bedtime  heparin  Injectable 5000 Unit(s) SubCutaneous every 12 hours  hydrALAZINE 50 milliGRAM(s) Oral three times a day  insulin glargine Injectable (LANTUS) 10 Unit(s) SubCutaneous at bedtime  insulin lispro (HumaLOG) corrective regimen sliding scale   SubCutaneous three times a day before meals  insulin lispro (HumaLOG) corrective regimen sliding scale   SubCutaneous at bedtime  metoprolol tartrate 25 milliGRAM(s) Oral two times a day  pantoprazole    Tablet 40 milliGRAM(s) Oral before breakfast  QUEtiapine 200 milliGRAM(s) Oral daily  sevelamer carbonate 2400 milliGRAM(s) Oral three times a day with meals  simvastatin 40 milliGRAM(s) Oral at bedtime  tiotropium 18 MICROgram(s) Capsule 1 Capsule(s) Inhalation daily    MEDICATIONS  (PRN):  acetaminophen   Tablet .. 650 milliGRAM(s) Oral every 6 hours PRN Moderate Pain (4 - 6)  guaiFENesin/dextromethorphan  Syrup 10 milliLiter(s) Oral every 6 hours PRN Cough      __________________________________________________  REVIEW OF SYSTEMS:    CONSTITUTION: I feel terrible  EYES: no acute visual disturbances  NECK: No pain or stiffness  RESPIRATORY: No cough; PIMENTEL  CARDIOVASCULAR: No chest pain, no palpitations  GASTROINTESTINAL: No pain. No nausea or vomiting; No diarrhea   NEUROLOGICAL: No headache or numbness, no tremors  MUSCULOSKELETAL: knee pain  GENITOURINARY:PSYCHIATRY: no depression , no anxiety  ALL OTHER  ROS negative        Vital Signs Last 24 Hrs  T(C): 36.9 (03 Jun 2019 12:18), Max: 36.9 (03 Jun 2019 12:18)  T(F): 98.4 (03 Jun 2019 12:18), Max: 98.4 (03 Jun 2019 12:18)  HR: 89 (03 Jun 2019 14:20) (83 - 96)  BP: 160/90 (03 Jun 2019 12:18) (150/87 - 177/99)  BP(mean): --  RR: 19 (03 Jun 2019 12:18) (19 - 20)  SpO2: 96% (03 Jun 2019 14:20) (96% - 100%)    ________________________________________________  PHYSICAL EXAM:  GENERAL: morbidly obese  HEENT: Normocephalic;  conjunctivae and sclerae clear; moist mucous membranes; Mallampati 4   NECK : supple, large  CHEST/LUNG: Diminished breath sounds bilateral bases  HEART: S1 S2  regular;   ABDOMEN: Obese, Soft, Nontender, Nondistended; Bowel sounds present  EXTREMITIES: +2 lower extremity edema   SKIN: warm and dry; no rash  NERVOUS SYSTEM:  Awake and alert; Oriented  to place, person and time     _________________________________________________  LABS:                        8.1    9.65  )-----------( 321      ( 03 Jun 2019 12:58 )             26.9     06-03    136  |  99  |  65<H>  ----------------------------<  116<H>  5.2   |  28  |  13.00<H>    Ca    8.2<L>      03 Jun 2019 12:58      CAPILLARY BLOOD GLUCOSE    POCT Blood Glucose.: 126 mg/dL (03 Jun 2019 08:11)  POCT Blood Glucose.: 101 mg/dL (02 Jun 2019 21:39)  POCT Blood Glucose.: 111 mg/dL (02 Jun 2019 16:41)    RADIOLOGY & ADDITIONAL TESTS:    Imaging  Reviewed:  YES  < from: Xray Chest 1 View- PORTABLE-Urgent (06.02.19 @ 05:05) >  There is a diffuse mild congestive picture in the lung fields somewhat   increased from May 28 of this year.    IMPRESSION: Somewhat increasing CHF.      < end of copied text >    Consultant(s) Notes Reviewed:   YES      Plan of care was discussed with patient and /or primary care giver; all questions and concerns were addressed

## 2019-06-04 NOTE — PROGRESS NOTE ADULT - PROBLEM SELECTOR PLAN 7
DVT ppx - heparin Continue lantus and sliding scale   A1C 5.7 Continue Lantus and sliding scale   A1C 5.7

## 2019-06-04 NOTE — PROGRESS NOTE ADULT - PROBLEM SELECTOR PROBLEM 3
Acute pain of left knee Pulmonary edema with congestive heart failure with preserved left ventricular function

## 2019-06-04 NOTE — PROGRESS NOTE ADULT - ASSESSMENT
38 y/o morbidly obese M PMH Type 2 DM, ESRD on HD (left arm graft, MWF), HTN, Asthma, Schizophrenia, Bacteremia due to Foot cellulitis presented to ED c/o worsening dyspnea w/ productive cough x 1 day - admitted for asthma exacerbation likely 2/2 continued smoking.

## 2019-06-04 NOTE — PROGRESS NOTE ADULT - PROBLEM SELECTOR PLAN 8
pt states he quit one month ago   concern for sending pt home with 02 if pt not committed to refraining from smoking Pt states he quit one month ago   concern for sending pt home with 02 if pt not committed to refraining from smoking

## 2019-06-04 NOTE — PROGRESS NOTE ADULT - PROBLEM SELECTOR PLAN 2
Home oxygen is being ordered for COPD. At rest, pt saturated 96%.   On ambulation, pt desaturated to 86% off 02. Improved to 94% with 4L nasal cannula. All acute issues have been resolved  Pt will require oxygen at home Home oxygen is being ordered for COPD. At rest, pt saturated 96%.   On ambulation, pt desaturated to 86% off 02. Improved to 94% with 4L nasal cannula. All acute issues have been resolved. CONT HD as PTA  Pt will require oxygen at home secondary to COPD (J 44.9) Secondary to KRYSTIN, obesity hypoventilation syndrome and morbid obesity.  Acute resolved. PCO2 51 on 100% oxygen. Repeat ABG pending.  Requires oxygen. O2 sat RA rest 96%. O2 sat RA ambulation 86% O2 sat on 4LPM during ambulation 94%.  Repeat ABG on RA. Needs outpatient sleep study. Secondary to KRYSTIN, obesity hypoventilation syndrome and morbid obesity.  Acute resolved. PCO2 51 on 100% oxygen. Repeat ABG pending.  Requires oxygen. O2 sat RA rest 96%. O2 sat RA ambulation 86% O2 sat on 4LPM .during ambulation 94%.  Repeat ABG on RA.  Pt refused until after HD.   Needs outpatient sleep study. Secondary to KRYSTIN, obesity hypoventilation syndrome and morbid obesity.  Acute resolved. PCO2 51 on 100% oxygen. Repeat ABG pending.  Requires oxygen. O2 sat RA rest 96%. O2 sat RA ambulation 86%. O2 sat on 4LPM.   up tp 94%.  Repeat ABG on RA.  Pt refused until after HD.   Needs outpatient sleep study.

## 2019-06-04 NOTE — DIETITIAN INITIAL EVALUATION ADULT. - PROBLEM SELECTOR PLAN 1
Verified pt name and .  Pt signed consent stated that the monitor will be returned to 8th floor Cardiology at the [facility].  Pt prepped and EKG leads placed.  Pt to be on holter monitor for 48 hours.  Instructions, diary and extra EKG pads provided.   per MD

## 2019-06-04 NOTE — PROGRESS NOTE ADULT - PROBLEM SELECTOR PLAN 3
PRN tylenol Chronic pulmonary edema secondary to CHF with preserved EF and ESRD.  Continue dialysis as per renal. Continue lasix.  Needs home oxygen. See above for details. Chronic pulmonary edema secondary to CHF with preserved EF and ESRD.  Continue dialysis as per renal. Continue Lasix.  Needs home oxygen. See above for details.

## 2019-06-04 NOTE — DIETITIAN INITIAL EVALUATION ADULT. - OTHER INFO
nutrition assessment for length of stay; lives home PTA; multiple hospital admissions noted; skin intact; on HD x 3y, followed by RD at out-pt HD center; denied GI distress, chewing or swallowing problem at present, 100% intake observed, requesting extra protein with meals, food choices obtained; wt data from [facility] EMR reviewed, a bit fluctuated with upward trending,  may partially due to fluid shift from HD Tx and fluid retention, 2 + pitting edema noted; Pt not interested in diet education/nutrition information at present

## 2019-06-04 NOTE — PROGRESS NOTE ADULT - SUBJECTIVE AND OBJECTIVE BOX
HPI:  39 Male with PMH of Type 2 DM, Migraine, cocaine abuse last dose week ago, ESRD on HD (left arm graft, M/W/F), Morbid obesity, HTN, Chr dyspnea (uses home O2 prn), Schizophrenia, Bacteremia due to foot cellulitis presented to ED c/o left knee pain for 2 days. Pain describes knee pain as sharp, 10/10 in intensity, non radiating, worse with movement, and unable to ambulate. . Pt reports that yesterday after riding in a car he was unable to get out secondary to severe pain in the L knee. Pt states that his PCP referred him to orthopedist Dr. Velazquez and he has a scheduled appointment on Anya 3. Pt relates an ACL injury when he was a teenager and denies any recent trauma. Pt endorses that his knee feels swollen. Patient also report of difficulty breathing since 1 day, he use his albuterol inhalers multiple times with no significant improvement in his symptoms. Patient missed his HD on Monday because he was unable to go to HD centre due to knee pain. patient denies fever, chills, headaches, chest pain, palpitation, nausea, vomiting, diarrhea, abdominal pain/distension, dysuria, skin rashes.  patient used cocaine last week    on admission pt was afebrile, hypertensive, O2 sat 100 on suppl O2  cbc showed no leucocytosis, Hb 8.8  bmp hyperkalemia K 5.5 No EKG changes  elevated BUN/cr   ABG - ( 29 May 2019 00:45 )  pH, Arterial: 7.32  pH, Blood: x     /  pCO2: 51    /  pO2: 223   / HCO3: 26    / Base Excess: -0.3  /  SaO2: 100     CT scan of knee Circumferential infiltration of the subcutaneous soft tissues about the left knee without evidence of a drainable collection. No areas of cortical destruction to suggest osteomyelitis. concern of cellulites  CXR showed congestion ++ (29 May 2019 03:53)      Patient is a 39y old  Male who presents with a chief complaint of Left knee pain (04 Jun 2019 09:55)      INTERVAL HPI/OVERNIGHT EVENTS:  T(C): 36.6 (06-04-19 @ 05:42), Max: 36.9 (06-03-19 @ 20:40)  HR: 96 (06-04-19 @ 08:20) (89 - 98)  BP: 140/75 (06-04-19 @ 05:42) (140/75 - 155/87)  RR: 16 (06-04-19 @ 05:42) (16 - 16)  SpO2: 97% (06-04-19 @ 08:20) (96% - 98%)  Wt(kg): --  I&O's Summary      REVIEW OF SYSTEMS: denies fever, chills, SOB, palpitations, chest pain, abdominal pain, nausea, vomitting, diarrhea, constipation, dizziness    MEDICATIONS  (STANDING):  ALBUTerol    90 MICROgram(s) HFA Inhaler 1 Puff(s) Inhalation every 4 hours  ALBUTerol/ipratropium for Nebulization 3 milliLiter(s) Nebulizer every 6 hours  amLODIPine   Tablet 5 milliGRAM(s) Oral daily  calcitriol   Capsule 0.25 MICROGram(s) Oral daily  chlorhexidine 4% Liquid 1 Application(s) Topical <User Schedule>  clopidogrel Tablet 75 milliGRAM(s) Oral daily  ferrous    sulfate 325 milliGRAM(s) Oral daily  furosemide    Tablet 80 milliGRAM(s) Oral daily  gabapentin 300 milliGRAM(s) Oral at bedtime  heparin  Injectable 5000 Unit(s) SubCutaneous every 12 hours  hydrALAZINE 50 milliGRAM(s) Oral three times a day  insulin glargine Injectable (LANTUS) 10 Unit(s) SubCutaneous at bedtime  insulin lispro (HumaLOG) corrective regimen sliding scale   SubCutaneous three times a day before meals  insulin lispro (HumaLOG) corrective regimen sliding scale   SubCutaneous at bedtime  metoprolol tartrate 25 milliGRAM(s) Oral two times a day  pantoprazole    Tablet 40 milliGRAM(s) Oral before breakfast  QUEtiapine 200 milliGRAM(s) Oral daily  sevelamer carbonate 2400 milliGRAM(s) Oral three times a day with meals  simvastatin 40 milliGRAM(s) Oral at bedtime  tiotropium 18 MICROgram(s) Capsule 1 Capsule(s) Inhalation daily    MEDICATIONS  (PRN):  acetaminophen   Tablet .. 650 milliGRAM(s) Oral every 6 hours PRN Moderate Pain (4 - 6)  guaiFENesin/dextromethorphan  Syrup 10 milliLiter(s) Oral every 6 hours PRN Cough      PHYSICAL EXAM:  GENERAL: NAD, well-groomed, well-developed  HEAD:  Atraumatic, Normocephalic  EYES: EOMI, PERRLA, conjunctiva and sclera clear  ENMT: No tonsillar erythema, exudates, or enlargement; Moist mucous membranes, Good dentition, No lesions  NECK: Supple, No JVD, Normal thyroid  NERVOUS SYSTEM:  Alert & Oriented X3, Good concentration; Motor Strength 5/5 B/L upper and lower extremities; DTRs 2+ intact and symmetric  CHEST/LUNG: Clear to percussion bilaterally; No rales, rhonchi, wheezing, or rubs  HEART: Regular rate and rhythm; No murmurs, rubs, or gallops  ABDOMEN: Soft, Nontender, Nondistended; Bowel sounds present  EXTREMITIES:  2+ Peripheral Pulses, No clubbing, cyanosis, or edema  LYMPH: No lymphadenopathy noted  SKIN: No rashes or lesions  LABS:                        8.1    9.65  )-----------( 321      ( 03 Jun 2019 12:58 )             26.9     06-03    136  |  99  |  65<H>  ----------------------------<  116<H>  5.2   |  28  |  13.00<H>    Ca    8.2<L>      03 Jun 2019 12:58          CAPILLARY BLOOD GLUCOSE      POCT Blood Glucose.: 148 mg/dL (04 Jun 2019 11:29)  POCT Blood Glucose.: 96 mg/dL (04 Jun 2019 08:03)  POCT Blood Glucose.: 153 mg/dL (03 Jun 2019 21:32)  POCT Blood Glucose.: 154 mg/dL (03 Jun 2019 16:40)

## 2019-06-04 NOTE — PROGRESS NOTE ADULT - ASSESSMENT
#ESRD. HD in AM.  # anemia of CKD. procrit on HD  #HTN cont norvasc, hydralazine and metoprolol  # LT knee pain.  better  #renal osteodystrophy. cont sevelamer.  d/c planning

## 2019-06-04 NOTE — DIETITIAN INITIAL EVALUATION ADULT. - PERTINENT MEDS FT
reviewed   MEDICATIONS  (STANDING):  ALBUTerol    90 MICROgram(s) HFA Inhaler 1 Puff(s) Inhalation every 4 hours  ALBUTerol/ipratropium for Nebulization 3 milliLiter(s) Nebulizer every 6 hours  amLODIPine   Tablet 5 milliGRAM(s) Oral daily  calcitriol   Capsule 0.25 MICROGram(s) Oral daily  chlorhexidine 4% Liquid 1 Application(s) Topical <User Schedule>  clopidogrel Tablet 75 milliGRAM(s) Oral daily  ferrous    sulfate 325 milliGRAM(s) Oral daily  furosemide    Tablet 80 milliGRAM(s) Oral daily  gabapentin 300 milliGRAM(s) Oral at bedtime  heparin  Injectable 5000 Unit(s) SubCutaneous every 12 hours  hydrALAZINE 50 milliGRAM(s) Oral three times a day  insulin glargine Injectable (LANTUS) 10 Unit(s) SubCutaneous at bedtime  insulin lispro (HumaLOG) corrective regimen sliding scale   SubCutaneous three times a day before meals  insulin lispro (HumaLOG) corrective regimen sliding scale   SubCutaneous at bedtime  metoprolol tartrate 25 milliGRAM(s) Oral two times a day  pantoprazole    Tablet 40 milliGRAM(s) Oral before breakfast  QUEtiapine 200 milliGRAM(s) Oral daily  sevelamer carbonate 2400 milliGRAM(s) Oral three times a day with meals  simvastatin 40 milliGRAM(s) Oral at bedtime  tiotropium 18 MICROgram(s) Capsule 1 Capsule(s) Inhalation daily    MEDICATIONS  (PRN):  acetaminophen   Tablet .. 650 milliGRAM(s) Oral every 6 hours PRN Moderate Pain (4 - 6)  guaiFENesin/dextromethorphan  Syrup 10 milliLiter(s) Oral every 6 hours PRN Cough

## 2019-06-04 NOTE — DIETITIAN INITIAL EVALUATION ADULT. - PERTINENT LABORATORY DATA
reviewed   06-03 Na136 mmol/L Glu 116 mg/dL<H> K+ 5.2 mmol/L Cr  13.00 mg/dL<H> BUN 65 mg/dL<H>   eGFR=4   K=5.5-->5.2   PO4=8.2    TqyP1K=7.7

## 2019-06-04 NOTE — DIETITIAN INITIAL EVALUATION ADULT. - NUTRITION INTERVENTION
Feeding Assistance/Nutrition Education/Discharge and Transfer of Nutrition Care to New Setting/Meals and Snack Meals and Snack/Discharge and Transfer of Nutrition Care to New Setting/Feeding Assistance

## 2019-06-04 NOTE — PROGRESS NOTE ADULT - SUBJECTIVE AND OBJECTIVE BOX
[facility] : Progress Note :: [phone], (office [phone]),   Dr La / Dr Hui / Dr Barber / Dr Villalobos / Dr Hang CASTELLANO / Dr Mehta / Dr Sanchez / Dr Alber dumont  _____________________________________________________________________________________________  s/p HD yesterday    aspirin (Short breath)  aspirin (Swelling)  penicillin (Short breath)  penicillins (Swelling)  shellfish (Unknown)    Hospital Medications:   MEDICATIONS  (STANDING):  ALBUTerol    90 MICROgram(s) HFA Inhaler 1 Puff(s) Inhalation every 4 hours  ALBUTerol/ipratropium for Nebulization 3 milliLiter(s) Nebulizer every 6 hours  amLODIPine   Tablet 5 milliGRAM(s) Oral daily  calcitriol   Capsule 0.25 MICROGram(s) Oral daily  chlorhexidine 4% Liquid 1 Application(s) Topical <User Schedule>  clopidogrel Tablet 75 milliGRAM(s) Oral daily  ferrous    sulfate 325 milliGRAM(s) Oral daily  furosemide    Tablet 80 milliGRAM(s) Oral daily  gabapentin 300 milliGRAM(s) Oral at bedtime  heparin  Injectable 5000 Unit(s) SubCutaneous every 12 hours  hydrALAZINE 50 milliGRAM(s) Oral three times a day  insulin glargine Injectable (LANTUS) 10 Unit(s) SubCutaneous at bedtime  insulin lispro (HumaLOG) corrective regimen sliding scale   SubCutaneous three times a day before meals  insulin lispro (HumaLOG) corrective regimen sliding scale   SubCutaneous at bedtime  metoprolol tartrate 25 milliGRAM(s) Oral two times a day  pantoprazole    Tablet 40 milliGRAM(s) Oral before breakfast  QUEtiapine 200 milliGRAM(s) Oral daily  sevelamer carbonate 2400 milliGRAM(s) Oral three times a day with meals  simvastatin 40 milliGRAM(s) Oral at bedtime  tiotropium 18 MICROgram(s) Capsule 1 Capsule(s) Inhalation daily        VITALS:  T(F): 98.4 (06-04-19 @ 14:30), Max: 98.4 (06-03-19 @ 20:40)  HR: 88 (06-04-19 @ 14:30)  BP: 192/101 (06-04-19 @ 14:30)  RR: 16 (06-04-19 @ 14:30)  SpO2: 96% (06-04-19 @ 13:00)  Wt(kg): --      PHYSICAL EXAM:  Constitutional: NAD  HEENT: anicteric sclera, oropharynx clear, MMM  Neck: No JVD  Respiratory: CTAB, no wheezes, rales or rhonchi  Cardiovascular: S1, S2, RRR  Gastrointestinal: BS+, soft, NT/ND  Extremities: peripheral edema++  Neurological: A/O x 3, no focal deficits  Vascular Access: AVF with thrill and bruit    LABS:  06-04    135  |  99  |  75<H>  ----------------------------<  126<H>  6.3<HH>   |  28  |  14.10<H>    Ca    7.9<L>      04 Jun 2019 14:45      Creatinine Trend: 14.10 <--, 13.00 <--, 10.50 <--, 13.20 <--                        7.8    9.77  )-----------( 288      ( 04 Jun 2019 14:45 )             25.4     Urine Studies:      RADIOLOGY & ADDITIONAL STUDIES:

## 2019-06-04 NOTE — PROGRESS NOTE ADULT - PROBLEM SELECTOR PLAN 4
lifestyle modification counseling Home oxygen is being ordered for COPD. At rest, pt saturated 96%.   On ambulation, pt desaturated to 86% off 02. Improved to 94% with 4L nasal cannula. All acute issues have been resolved. CONT HD as PTA  Pt will require oxygen at home secondary to COPD (J 44.9)

## 2019-06-04 NOTE — DIETITIAN INITIAL EVALUATION ADULT. - ETIOLOGY
chronic excessive kcal intake than kcal expenditure, fluid shift from ESRD on HD chronic comorbidities including ESRD on HD, DM

## 2019-06-05 ENCOUNTER — TRANSCRIPTION ENCOUNTER (OUTPATIENT)
Age: 39
End: 2019-06-05

## 2019-06-05 VITALS
TEMPERATURE: 98 F | RESPIRATION RATE: 16 BRPM | HEART RATE: 99 BPM | DIASTOLIC BLOOD PRESSURE: 70 MMHG | SYSTOLIC BLOOD PRESSURE: 128 MMHG | OXYGEN SATURATION: 100 %

## 2019-06-05 LAB
GLUCOSE BLDC GLUCOMTR-MCNC: 108 MG/DL — HIGH (ref 70–99)
GLUCOSE BLDC GLUCOMTR-MCNC: 146 MG/DL — HIGH (ref 70–99)
GLUCOSE BLDC GLUCOMTR-MCNC: 99 MG/DL — SIGNIFICANT CHANGE UP (ref 70–99)

## 2019-06-05 PROCEDURE — 71045 X-RAY EXAM CHEST 1 VIEW: CPT

## 2019-06-05 PROCEDURE — 83735 ASSAY OF MAGNESIUM: CPT

## 2019-06-05 PROCEDURE — 99261: CPT

## 2019-06-05 PROCEDURE — 73721 MRI JNT OF LWR EXTRE W/O DYE: CPT

## 2019-06-05 PROCEDURE — 73562 X-RAY EXAM OF KNEE 3: CPT

## 2019-06-05 PROCEDURE — 84100 ASSAY OF PHOSPHORUS: CPT

## 2019-06-05 PROCEDURE — 85730 THROMBOPLASTIN TIME PARTIAL: CPT

## 2019-06-05 PROCEDURE — 85610 PROTHROMBIN TIME: CPT

## 2019-06-05 PROCEDURE — 82306 VITAMIN D 25 HYDROXY: CPT

## 2019-06-05 PROCEDURE — 73700 CT LOWER EXTREMITY W/O DYE: CPT

## 2019-06-05 PROCEDURE — 83880 ASSAY OF NATRIURETIC PEPTIDE: CPT

## 2019-06-05 PROCEDURE — 80307 DRUG TEST PRSMV CHEM ANLYZR: CPT

## 2019-06-05 PROCEDURE — 99285 EMERGENCY DEPT VISIT HI MDM: CPT | Mod: 25

## 2019-06-05 PROCEDURE — 82310 ASSAY OF CALCIUM: CPT

## 2019-06-05 PROCEDURE — 36415 COLL VENOUS BLD VENIPUNCTURE: CPT

## 2019-06-05 PROCEDURE — 80053 COMPREHEN METABOLIC PANEL: CPT

## 2019-06-05 PROCEDURE — 82803 BLOOD GASES ANY COMBINATION: CPT

## 2019-06-05 PROCEDURE — 85379 FIBRIN DEGRADATION QUANT: CPT

## 2019-06-05 PROCEDURE — 87040 BLOOD CULTURE FOR BACTERIA: CPT

## 2019-06-05 PROCEDURE — 96366 THER/PROPH/DIAG IV INF ADDON: CPT

## 2019-06-05 PROCEDURE — 85027 COMPLETE CBC AUTOMATED: CPT

## 2019-06-05 PROCEDURE — 83970 ASSAY OF PARATHORMONE: CPT

## 2019-06-05 PROCEDURE — 84484 ASSAY OF TROPONIN QUANT: CPT

## 2019-06-05 PROCEDURE — 94640 AIRWAY INHALATION TREATMENT: CPT

## 2019-06-05 PROCEDURE — 96375 TX/PRO/DX INJ NEW DRUG ADDON: CPT

## 2019-06-05 PROCEDURE — 80048 BASIC METABOLIC PNL TOTAL CA: CPT

## 2019-06-05 PROCEDURE — 82652 VIT D 1 25-DIHYDROXY: CPT

## 2019-06-05 PROCEDURE — 82962 GLUCOSE BLOOD TEST: CPT

## 2019-06-05 PROCEDURE — 96365 THER/PROPH/DIAG IV INF INIT: CPT

## 2019-06-05 PROCEDURE — 93005 ELECTROCARDIOGRAM TRACING: CPT

## 2019-06-05 PROCEDURE — 93971 EXTREMITY STUDY: CPT

## 2019-06-05 RX ORDER — TRAMADOL HYDROCHLORIDE 50 MG/1
50 TABLET ORAL ONCE
Refills: 0 | Status: DISCONTINUED | OUTPATIENT
Start: 2019-06-05 | End: 2019-06-05

## 2019-06-05 RX ORDER — GABAPENTIN 400 MG/1
1 CAPSULE ORAL
Qty: 21 | Refills: 0
Start: 2019-06-05

## 2019-06-05 RX ADMIN — Medication 3 MILLILITER(S): at 15:25

## 2019-06-05 RX ADMIN — CLOPIDOGREL BISULFATE 75 MILLIGRAM(S): 75 TABLET, FILM COATED ORAL at 11:39

## 2019-06-05 RX ADMIN — CHLORHEXIDINE GLUCONATE 1 APPLICATION(S): 213 SOLUTION TOPICAL at 06:01

## 2019-06-05 RX ADMIN — Medication 3 MILLILITER(S): at 08:24

## 2019-06-05 RX ADMIN — Medication 50 MILLIGRAM(S): at 06:00

## 2019-06-05 RX ADMIN — TRAMADOL HYDROCHLORIDE 50 MILLIGRAM(S): 50 TABLET ORAL at 14:43

## 2019-06-05 RX ADMIN — CALCITRIOL 0.25 MICROGRAM(S): 0.5 CAPSULE ORAL at 11:40

## 2019-06-05 RX ADMIN — Medication 25 MILLIGRAM(S): at 06:00

## 2019-06-05 RX ADMIN — Medication 325 MILLIGRAM(S): at 11:40

## 2019-06-05 RX ADMIN — Medication 50 MILLIGRAM(S): at 13:34

## 2019-06-05 RX ADMIN — PANTOPRAZOLE SODIUM 40 MILLIGRAM(S): 20 TABLET, DELAYED RELEASE ORAL at 06:01

## 2019-06-05 RX ADMIN — Medication 80 MILLIGRAM(S): at 06:00

## 2019-06-05 RX ADMIN — AMLODIPINE BESYLATE 5 MILLIGRAM(S): 2.5 TABLET ORAL at 06:00

## 2019-06-05 RX ADMIN — SEVELAMER CARBONATE 2400 MILLIGRAM(S): 2400 POWDER, FOR SUSPENSION ORAL at 11:40

## 2019-06-05 RX ADMIN — Medication 10 MILLILITER(S): at 03:56

## 2019-06-05 RX ADMIN — QUETIAPINE FUMARATE 200 MILLIGRAM(S): 200 TABLET, FILM COATED ORAL at 11:40

## 2019-06-05 RX ADMIN — TRAMADOL HYDROCHLORIDE 50 MILLIGRAM(S): 50 TABLET ORAL at 15:52

## 2019-06-05 RX ADMIN — SEVELAMER CARBONATE 2400 MILLIGRAM(S): 2400 POWDER, FOR SUSPENSION ORAL at 08:32

## 2019-06-05 RX ADMIN — HEPARIN SODIUM 5000 UNIT(S): 5000 INJECTION INTRAVENOUS; SUBCUTANEOUS at 06:01

## 2019-06-05 NOTE — PROGRESS NOTE ADULT - ASSESSMENT
#ESRD. HD in AM. at [facility]  # anemia of CKD. procrit on HD  #HTN cont norvasc, hydralazine and metoprolol  # LT knee pain better.  #renal osteodystrophy. cont sevelamer.

## 2019-06-05 NOTE — DISCHARGE NOTE NURSING/CASE MANAGEMENT/SOCIAL WORK - NSDCPEWEB_GEN_ALL_CORE
[place] website --- [email or web address] for Tobacco Control website --- [email or web address]

## 2019-06-05 NOTE — PROGRESS NOTE ADULT - PROVIDER SPECIALTY LIST ADULT
Internal Medicine
Nephrology
Neurology

## 2019-06-05 NOTE — DISCHARGE NOTE NURSING/CASE MANAGEMENT/SOCIAL WORK - NSDCPEPT PROEDHF_GEN_ALL_CORE
Monitor weight daily/Report signs and symptoms to primary care provider/Call primary care provider for follow up after discharge/Low salt diet/Activities as tolerated

## 2019-06-05 NOTE — PROGRESS NOTE ADULT - SUBJECTIVE AND OBJECTIVE BOX
HPI:  39 Male with PMH of Type 2 DM, Migraine, cocaine abuse last dose week ago, ESRD on HD (left arm graft, M/W/F), Morbid obesity, HTN, Chr dyspnea (uses home O2 prn), Schizophrenia, Bacteremia due to foot cellulitis presented to ED c/o left knee pain for 2 days. Pain describes knee pain as sharp, 10/10 in intensity, non radiating, worse with movement, and unable to ambulate. . Pt reports that yesterday after riding in a car he was unable to get out secondary to severe pain in the L knee. Pt states that his PCP referred him to orthopedist Dr. Velazquez and he has a scheduled appointment on Anya 3. Pt relates an ACL injury when he was a teenager and denies any recent trauma. Pt endorses that his knee feels swollen. Patient also report of difficulty breathing since 1 day, he use his albuterol inhalers multiple times with no significant improvement in his symptoms. Patient missed his HD on Monday because he was unable to go to HD centre due to knee pain. patient denies fever, chills, headaches, chest pain, palpitation, nausea, vomiting, diarrhea, abdominal pain/distension, dysuria, skin rashes.  patient used cocaine last week    on admission pt was afebrile, hypertensive, O2 sat 100 on suppl O2  cbc showed no leucocytosis, Hb 8.8  bmp hyperkalemia K 5.5 No EKG changes  elevated BUN/cr   ABG - ( 29 May 2019 00:45 )  pH, Arterial: 7.32  pH, Blood: x     /  pCO2: 51    /  pO2: 223   / HCO3: 26    / Base Excess: -0.3  /  SaO2: 100     CT scan of knee Circumferential infiltration of the subcutaneous soft tissues about the left knee without evidence of a drainable collection. No areas of cortical destruction to suggest osteomyelitis. concern of cellulites  CXR showed congestion ++ (29 May 2019 03:53)      Patient is a 39y old  Male who presents with a chief complaint of Left knee pain (05 Jun 2019 14:20)      INTERVAL HPI/OVERNIGHT EVENTS:  T(C): 36.8 (06-05-19 @ 12:11), Max: 37.3 (06-04-19 @ 20:50)  HR: 96 (06-05-19 @ 12:11) (9 - 96)  BP: 122/64 (06-05-19 @ 12:11) (122/64 - 199/108)  RR: 17 (06-05-19 @ 12:11) (16 - 20)  SpO2: 98% (06-05-19 @ 12:11) (95% - 100%)  Wt(kg): --  I&O's Summary      REVIEW OF SYSTEMS: denies fever, chills, SOB, palpitations, chest pain, abdominal pain, nausea, vomitting, diarrhea, constipation, dizziness    MEDICATIONS  (STANDING):  ALBUTerol    90 MICROgram(s) HFA Inhaler 1 Puff(s) Inhalation every 4 hours  ALBUTerol/ipratropium for Nebulization 3 milliLiter(s) Nebulizer every 6 hours  amLODIPine   Tablet 5 milliGRAM(s) Oral daily  calcitriol   Capsule 0.25 MICROGram(s) Oral daily  chlorhexidine 4% Liquid 1 Application(s) Topical <User Schedule>  clopidogrel Tablet 75 milliGRAM(s) Oral daily  ferrous    sulfate 325 milliGRAM(s) Oral daily  furosemide    Tablet 80 milliGRAM(s) Oral daily  gabapentin 300 milliGRAM(s) Oral at bedtime  heparin  Injectable 5000 Unit(s) SubCutaneous every 12 hours  hydrALAZINE 50 milliGRAM(s) Oral three times a day  insulin glargine Injectable (LANTUS) 10 Unit(s) SubCutaneous at bedtime  insulin lispro (HumaLOG) corrective regimen sliding scale   SubCutaneous three times a day before meals  insulin lispro (HumaLOG) corrective regimen sliding scale   SubCutaneous at bedtime  metoprolol tartrate 25 milliGRAM(s) Oral two times a day  pantoprazole    Tablet 40 milliGRAM(s) Oral before breakfast  QUEtiapine 200 milliGRAM(s) Oral daily  sevelamer carbonate 2400 milliGRAM(s) Oral three times a day with meals  simvastatin 40 milliGRAM(s) Oral at bedtime  tiotropium 18 MICROgram(s) Capsule 1 Capsule(s) Inhalation daily    MEDICATIONS  (PRN):  acetaminophen   Tablet .. 650 milliGRAM(s) Oral every 6 hours PRN Moderate Pain (4 - 6)  guaiFENesin/dextromethorphan  Syrup 10 milliLiter(s) Oral every 6 hours PRN Cough      PHYSICAL EXAM:  GENERAL: NAD, well-groomed, well-developed  HEAD:  Atraumatic, Normocephalic  EYES: EOMI, PERRLA, conjunctiva and sclera clear  ENMT: No tonsillar erythema, exudates, or enlargement; Moist mucous membranes, Good dentition, No lesions  NECK: Supple, No JVD, Normal thyroid  NERVOUS SYSTEM:  Alert & Oriented X3, Good concentration; Motor Strength 5/5 B/L upper and lower extremities; DTRs 2+ intact and symmetric  CHEST/LUNG: Clear to percussion bilaterally; No rales, rhonchi, wheezing, or rubs  HEART: Regular rate and rhythm; No murmurs, rubs, or gallops  ABDOMEN: Soft, Nontender, Nondistended; Bowel sounds present  EXTREMITIES:  2+ Peripheral Pulses, No clubbing, cyanosis, or edema  LYMPH: No lymphadenopathy noted  SKIN: No rashes or lesions  LABS:                        7.8    9.77  )-----------( 288      ( 04 Jun 2019 14:45 )             25.4     06-04    135  |  99  |  75<H>  ----------------------------<  126<H>  6.3<HH>   |  28  |  14.10<H>    Ca    7.9<L>      04 Jun 2019 14:45          CAPILLARY BLOOD GLUCOSE      POCT Blood Glucose.: 108 mg/dL (05 Jun 2019 11:59)  POCT Blood Glucose.: 99 mg/dL (05 Jun 2019 08:05)  POCT Blood Glucose.: 138 mg/dL (04 Jun 2019 21:13)  POCT Blood Glucose.: 97 mg/dL (04 Jun 2019 16:50)      ABG - ( 04 Jun 2019 18:37 )  pH, Arterial: 7.35  pH, Blood: x     /  pCO2: 52    /  pO2: 92    / HCO3: 28    / Base Excess: 2.3   /  SaO2: 97

## 2019-06-05 NOTE — DISCHARGE NOTE NURSING/CASE MANAGEMENT/SOCIAL WORK - NSDCDPATPORTLINK_GEN_ALL_CORE
You can access the [email or web address] Patient Portal, offered by [facility], by registering with the following website: [email or web address]

## 2019-06-05 NOTE — PROGRESS NOTE ADULT - REASON FOR ADMISSION
Left knee pain

## 2019-06-05 NOTE — PROGRESS NOTE ADULT - SUBJECTIVE AND OBJECTIVE BOX
[facility] : Progress Note :: [phone], (office [phone]),   Dr La / Dr Hui / Dr Barber / Dr Villalobos / Dr Hang CASTELLANO / Dr Mehta / Dr Sanchez / Dr Alber dumont  _____________________________________________________________________________________________  s/p HD yesterday     aspirin (Short breath)  aspirin (Swelling)  penicillin (Short breath)  penicillins (Swelling)  shellfish (Unknown)    Hospital Medications:   MEDICATIONS  (STANDING):  ALBUTerol    90 MICROgram(s) HFA Inhaler 1 Puff(s) Inhalation every 4 hours  ALBUTerol/ipratropium for Nebulization 3 milliLiter(s) Nebulizer every 6 hours  amLODIPine   Tablet 5 milliGRAM(s) Oral daily  calcitriol   Capsule 0.25 MICROGram(s) Oral daily  chlorhexidine 4% Liquid 1 Application(s) Topical <User Schedule>  clopidogrel Tablet 75 milliGRAM(s) Oral daily  ferrous    sulfate 325 milliGRAM(s) Oral daily  furosemide    Tablet 80 milliGRAM(s) Oral daily  gabapentin 300 milliGRAM(s) Oral at bedtime  heparin  Injectable 5000 Unit(s) SubCutaneous every 12 hours  hydrALAZINE 50 milliGRAM(s) Oral three times a day  insulin glargine Injectable (LANTUS) 10 Unit(s) SubCutaneous at bedtime  insulin lispro (HumaLOG) corrective regimen sliding scale   SubCutaneous three times a day before meals  insulin lispro (HumaLOG) corrective regimen sliding scale   SubCutaneous at bedtime  metoprolol tartrate 25 milliGRAM(s) Oral two times a day  pantoprazole    Tablet 40 milliGRAM(s) Oral before breakfast  QUEtiapine 200 milliGRAM(s) Oral daily  sevelamer carbonate 2400 milliGRAM(s) Oral three times a day with meals  simvastatin 40 milliGRAM(s) Oral at bedtime  tiotropium 18 MICROgram(s) Capsule 1 Capsule(s) Inhalation daily        VITALS:  T(F): 98.2 (06-05-19 @ 12:11), Max: 99.1 (06-04-19 @ 20:50)  HR: 96 (06-05-19 @ 12:11)  BP: 122/64 (06-05-19 @ 12:11)  RR: 17 (06-05-19 @ 12:11)  SpO2: 98% (06-05-19 @ 12:11)  Wt(kg): --      PHYSICAL EXAM:  Constitutional: NAD  HEENT: anicteric sclera, oropharynx clear.  Neck: No JVD  Respiratory: CTAB, no wheezes, rales or rhonchi  Cardiovascular: S1, S2, RRR  Gastrointestinal: BS+, soft, NT/ND  Extremities: peripheral edema++  Neurological: A/O x 3,   Vascular Access: AVF with thrill and bruit    LABS:  06-04    135  |  99  |  75<H>  ----------------------------<  126<H>  6.3<HH>   |  28  |  14.10<H>    Ca    7.9<L>      04 Jun 2019 14:45      Creatinine Trend: 14.10 <--, 13.00 <--                        7.8    9.77  )-----------( 288      ( 04 Jun 2019 14:45 )             25.4     Urine Studies:      RADIOLOGY & ADDITIONAL STUDIES:

## 2019-06-05 NOTE — PROGRESS NOTE ADULT - ASSESSMENT
seen and examined vsstable afebrile  physical unchanged comfortable not in any distress.  no cp or sob or palp. bm ok   breathing ok  hgb 7.8    knee pain better  ortho f/u out pt  smoker  quit smoking again advised  as well as cocain   risks explained  hypoxic, obesity , hypercarbia in abg pickwian syndrome ?, sleep apnea syndrome. out pt pulmonary and sleep studies  pt is going with 2 lnc.  BMI 45  i d/w pt to see Bariatric surgeon for consult as out pt  dm, esrd  cont same

## 2019-06-05 NOTE — DISCHARGE NOTE NURSING/CASE MANAGEMENT/SOCIAL WORK - NSDCPEEMAIL_GEN_ALL_CORE
[facility] for Tobacco Control email [email or web address]

## 2019-06-10 ENCOUNTER — FORM ENCOUNTER (OUTPATIENT)
Age: 39
End: 2019-06-10

## 2019-06-11 ENCOUNTER — APPOINTMENT (OUTPATIENT)
Dept: ENDOVASCULAR SURGERY | Facility: CLINIC | Age: 39
End: 2019-06-11
Payer: MEDICARE

## 2019-06-11 PROCEDURE — 36903Z: CUSTOM

## 2019-06-11 RX ORDER — QUETIAPINE FUMARATE 25 MG/1
25 TABLET ORAL
Refills: 0 | Status: DISCONTINUED | COMMUNITY
End: 2019-06-11

## 2019-06-12 NOTE — HISTORY OF PRESENT ILLNESS
[] : left brachiocephalic fistula [FreeTextEntry1] : 2016 [FreeTextEntry4] : yesterday [FreeTextEntry5] : yesterday at 11 pm [FreeTextEntry6] : Dr. Jimenez

## 2019-06-12 NOTE — PAST MEDICAL HISTORY
[Increasing age ( >40 years old)] : Increasing age ( >40 years old) [No therapy indicated for cases scheduled for less than one hour] : No therapy indicated for cases scheduled for less than one hour. [Obesity: BMI >25] : Obesity: BMI >25 [FreeTextEntry1] : Malignant Hyperthermia Screening Tool and Risk of Bleeding Assessment\par \par Mr. PRESTON JOHNSON JR denies family history of unexpected death following Anesthesia or Exercise.\par Denies Family history of Malignant Hyperthermia, Muscle or Neuromuscular disorder and High Temperature following exercise.\par \par Mr. PRESTON JOHNSON JR denies history of Muscle Spasm, Dark or Chocolate - Colored urine and Unanticipated fever immediately following anesthesia or serious exercise. \par Mr. ALEX MAHONEY also denies bleeding tendencies/ Risks of Bleeding.\par

## 2019-06-12 NOTE — PROCEDURE
[Resume diet] : resume diet [Site check for bleeding/hematoma/thrill/bruit] : Site check for bleeding/hematoma/thrill/bruit [Vital signs on admission the q 15 mins x2] : Vital signs on admission the q 15 mins x2 [FreeTextEntry1] : left arm fistula/fistulogram/angioplasty

## 2019-06-21 ENCOUNTER — APPOINTMENT (OUTPATIENT)
Dept: VASCULAR SURGERY | Facility: CLINIC | Age: 39
End: 2019-06-21
Payer: MEDICARE

## 2019-06-21 PROCEDURE — 93923 UPR/LXTR ART STDY 3+ LVLS: CPT

## 2019-06-21 PROCEDURE — 99213 OFFICE O/P EST LOW 20 MIN: CPT

## 2019-06-21 NOTE — HISTORY OF PRESENT ILLNESS
[FreeTextEntry1] : Patient with renal failure on hemodialysis complaining of bilateral lower extremity edema and discomfort. No history of tissue loss.

## 2019-06-21 NOTE — ASSESSMENT
[FreeTextEntry1] : Patient with lower extremity discomfort. No evidence of significant arterial insufficiency on arterial Dopplers. Continue medical management. Followup in one month for venous duplex.

## 2019-06-21 NOTE — PHYSICAL EXAM
[JVD] : no jugular venous distention  [Normal Breath Sounds] : Normal breath sounds [Normal Heart Sounds] : normal heart sounds [Ankle Swelling (On Exam)] : present [2+] : left 2+ [Ankle Swelling Bilaterally] : bilaterally  [Ankle Swelling On The Left] : moderate [Varicose Veins Of Lower Extremities] : not present [] : not present [Skin Ulcer] : no ulcer [Abdomen Masses] : No abdominal masses [Oriented to Place] : oriented to place

## 2019-07-03 ENCOUNTER — APPOINTMENT (OUTPATIENT)
Dept: ORTHOPEDIC SURGERY | Facility: CLINIC | Age: 39
End: 2019-07-03
Payer: MEDICARE

## 2019-07-03 VITALS — WEIGHT: 315 LBS | BODY MASS INDEX: 36.45 KG/M2 | HEIGHT: 78 IN

## 2019-07-03 DIAGNOSIS — M94.262 CHONDROMALACIA, LEFT KNEE: ICD-10-CM

## 2019-07-03 DIAGNOSIS — Z78.9 OTHER SPECIFIED HEALTH STATUS: ICD-10-CM

## 2019-07-03 DIAGNOSIS — M76.52 PATELLAR TENDINITIS, LEFT KNEE: ICD-10-CM

## 2019-07-03 DIAGNOSIS — Z87.891 PERSONAL HISTORY OF NICOTINE DEPENDENCE: ICD-10-CM

## 2019-07-03 DIAGNOSIS — M22.2X2 PATELLOFEMORAL DISORDERS, LEFT KNEE: ICD-10-CM

## 2019-07-03 PROCEDURE — 73564 X-RAY EXAM KNEE 4 OR MORE: CPT | Mod: LT

## 2019-07-03 PROCEDURE — 99214 OFFICE O/P EST MOD 30 MIN: CPT | Mod: 25

## 2019-07-03 PROCEDURE — 20611 DRAIN/INJ JOINT/BURSA W/US: CPT | Mod: LT

## 2019-07-07 ENCOUNTER — INPATIENT (INPATIENT)
Facility: HOSPITAL | Age: 39
LOS: 0 days | Discharge: HOME | DRG: 640 | End: 2019-07-08
Attending: INTERNAL MEDICINE | Admitting: INTERNAL MEDICINE
Payer: COMMERCIAL

## 2019-07-07 VITALS
HEART RATE: 94 BPM | HEIGHT: 74 IN | WEIGHT: 315 LBS | OXYGEN SATURATION: 98 % | SYSTOLIC BLOOD PRESSURE: 156 MMHG | RESPIRATION RATE: 18 BRPM | TEMPERATURE: 98 F | DIASTOLIC BLOOD PRESSURE: 97 MMHG

## 2019-07-07 DIAGNOSIS — Z29.9 ENCOUNTER FOR PROPHYLACTIC MEASURES, UNSPECIFIED: ICD-10-CM

## 2019-07-07 DIAGNOSIS — J81.0 ACUTE PULMONARY EDEMA: ICD-10-CM

## 2019-07-07 DIAGNOSIS — E11.9 TYPE 2 DIABETES MELLITUS WITHOUT COMPLICATIONS: ICD-10-CM

## 2019-07-07 DIAGNOSIS — N18.6 END STAGE RENAL DISEASE: ICD-10-CM

## 2019-07-07 DIAGNOSIS — I10 ESSENTIAL (PRIMARY) HYPERTENSION: ICD-10-CM

## 2019-07-07 DIAGNOSIS — Z98.890 OTHER SPECIFIED POSTPROCEDURAL STATES: Chronic | ICD-10-CM

## 2019-07-07 DIAGNOSIS — F31.9 BIPOLAR DISORDER, UNSPECIFIED: ICD-10-CM

## 2019-07-07 DIAGNOSIS — R06.02 SHORTNESS OF BREATH: ICD-10-CM

## 2019-07-07 LAB
ANION GAP SERPL CALC-SCNC: 9 MMOL/L — SIGNIFICANT CHANGE UP (ref 5–17)
APTT BLD: 34.1 SEC — SIGNIFICANT CHANGE UP (ref 27.5–36.3)
BASOPHILS # BLD AUTO: 0.05 K/UL — SIGNIFICANT CHANGE UP (ref 0–0.2)
BASOPHILS NFR BLD AUTO: 0.5 % — SIGNIFICANT CHANGE UP (ref 0–2)
BUN SERPL-MCNC: 39 MG/DL — HIGH (ref 7–18)
CALCIUM SERPL-MCNC: 8.4 MG/DL — SIGNIFICANT CHANGE UP (ref 8.4–10.5)
CHLORIDE SERPL-SCNC: 101 MMOL/L — SIGNIFICANT CHANGE UP (ref 96–108)
CO2 SERPL-SCNC: 28 MMOL/L — SIGNIFICANT CHANGE UP (ref 22–31)
CREAT SERPL-MCNC: 8.99 MG/DL — HIGH (ref 0.5–1.3)
EOSINOPHIL # BLD AUTO: 0.35 K/UL — SIGNIFICANT CHANGE UP (ref 0–0.5)
EOSINOPHIL NFR BLD AUTO: 3.3 % — SIGNIFICANT CHANGE UP (ref 0–6)
GLUCOSE BLDC GLUCOMTR-MCNC: 99 MG/DL — SIGNIFICANT CHANGE UP (ref 70–99)
GLUCOSE SERPL-MCNC: 139 MG/DL — HIGH (ref 70–99)
HCT VFR BLD CALC: 30.9 % — LOW (ref 39–50)
HGB BLD-MCNC: 9.3 G/DL — LOW (ref 13–17)
IMM GRANULOCYTES NFR BLD AUTO: 0.3 % — SIGNIFICANT CHANGE UP (ref 0–1.5)
INR BLD: 1.09 RATIO — SIGNIFICANT CHANGE UP (ref 0.88–1.16)
LYMPHOCYTES # BLD AUTO: 0.85 K/UL — LOW (ref 1–3.3)
LYMPHOCYTES # BLD AUTO: 8 % — LOW (ref 13–44)
MCHC RBC-ENTMCNC: 29.1 PG — SIGNIFICANT CHANGE UP (ref 27–34)
MCHC RBC-ENTMCNC: 30.1 GM/DL — LOW (ref 32–36)
MCV RBC AUTO: 96.6 FL — SIGNIFICANT CHANGE UP (ref 80–100)
MONOCYTES # BLD AUTO: 0.9 K/UL — SIGNIFICANT CHANGE UP (ref 0–0.9)
MONOCYTES NFR BLD AUTO: 8.5 % — SIGNIFICANT CHANGE UP (ref 2–14)
NEUTROPHILS # BLD AUTO: 8.47 K/UL — HIGH (ref 1.8–7.4)
NEUTROPHILS NFR BLD AUTO: 79.4 % — HIGH (ref 43–77)
NRBC # BLD: 0 /100 WBCS — SIGNIFICANT CHANGE UP (ref 0–0)
PLATELET # BLD AUTO: 318 K/UL — SIGNIFICANT CHANGE UP (ref 150–400)
POTASSIUM SERPL-MCNC: 4.2 MMOL/L — SIGNIFICANT CHANGE UP (ref 3.5–5.3)
POTASSIUM SERPL-SCNC: 4.2 MMOL/L — SIGNIFICANT CHANGE UP (ref 3.5–5.3)
PROTHROM AB SERPL-ACNC: 12.1 SEC — SIGNIFICANT CHANGE UP (ref 10–12.9)
RBC # BLD: 3.2 M/UL — LOW (ref 4.2–5.8)
RBC # FLD: 17 % — HIGH (ref 10.3–14.5)
SODIUM SERPL-SCNC: 138 MMOL/L — SIGNIFICANT CHANGE UP (ref 135–145)
WBC # BLD: 10.65 K/UL — HIGH (ref 3.8–10.5)
WBC # FLD AUTO: 10.65 K/UL — HIGH (ref 3.8–10.5)

## 2019-07-07 PROCEDURE — 99284 EMERGENCY DEPT VISIT MOD MDM: CPT

## 2019-07-07 RX ORDER — INSULIN LISPRO 100/ML
5 VIAL (ML) SUBCUTANEOUS
Refills: 0 | Status: DISCONTINUED | OUTPATIENT
Start: 2019-07-07 | End: 2019-07-08

## 2019-07-07 RX ORDER — INSULIN GLARGINE 100 [IU]/ML
15 INJECTION, SOLUTION SUBCUTANEOUS AT BEDTIME
Refills: 0 | Status: DISCONTINUED | OUTPATIENT
Start: 2019-07-07 | End: 2019-07-08

## 2019-07-07 RX ORDER — HYDRALAZINE HCL 50 MG
50 TABLET ORAL EVERY 8 HOURS
Refills: 0 | Status: DISCONTINUED | OUTPATIENT
Start: 2019-07-07 | End: 2019-07-08

## 2019-07-07 RX ORDER — HEPARIN SODIUM 5000 [USP'U]/ML
5000 INJECTION INTRAVENOUS; SUBCUTANEOUS EVERY 12 HOURS
Refills: 0 | Status: DISCONTINUED | OUTPATIENT
Start: 2019-07-07 | End: 2019-07-08

## 2019-07-07 RX ADMIN — HEPARIN SODIUM 5000 UNIT(S): 5000 INJECTION INTRAVENOUS; SUBCUTANEOUS at 23:38

## 2019-07-07 RX ADMIN — Medication 50 MILLIGRAM(S): at 23:38

## 2019-07-07 NOTE — H&P ADULT - HISTORY OF PRESENT ILLNESS
38 y/o male with ESRD on MWF HD (via LUE AVF), DM (on insulin), on home O2 (4L NC) and HTN presents to the ED with chief complaint of shortness of breath x2 days. Limited history is provided by patient as he is very dismissive and does not want to be disturbed. At present, he is able to convey that his only symptom is that he is short of breath. He is in no acute distress.

## 2019-07-07 NOTE — H&P ADULT - PROBLEM SELECTOR PLAN 4
pt reports taking lantus 20 units qhs and novolog 5 units premeal  will give 15 units lantus qhs inpatient initially and 5 units humalog premeal  primary team to titrate up as needed  f/u HbA1C

## 2019-07-07 NOTE — H&P ADULT - NSHPSOCIALHISTORY_GEN_ALL_CORE
Denies use of tobacco, alcohol or illicit drugs. Lives at home with parents. Ambulates independently.

## 2019-07-07 NOTE — H&P ADULT - PROBLEM SELECTOR PLAN 3
pt cannot confirm home meds  will start on hydralazine 50mg tid  monitor vitals  primary team to add on antihypertensives as needed

## 2019-07-07 NOTE — ED ADULT NURSE NOTE - OBJECTIVE STATEMENT
Pt c/o shortness of breath since 4 days. EKG completed. Pt Pt c/o shortness of breath since 4 days. EKG completed. Upon assessment, Pt SOB with exertion. Pt placed on 2L NC for comfort. No acute distress noted, denies chest pain. Safety maintained.

## 2019-07-07 NOTE — ED PROVIDER NOTE - PROGRESS NOTE DETAILS
Patient seen by Dr. Hui. Recommends admission to [facility] for dialysis. he will call dialysis nurse. Patient refusing labs. Can be done in dialysis.

## 2019-07-07 NOTE — CONSULT NOTE ADULT - ASSESSMENT
A/P  PT  WITH   MULTIPLE  ADMISSIONS  HERE  FOR  SIMILAR  PROBLEM,  RELATED  TO  NON  COMPLIANCE  WITH HIOS  FLUID  ALLOWANCE AND  HD  SCHEDULE    THOUGH   WENT  FOR  HIS  LAST  HD ON  FRID  IS IN  WITH VOL  OVERLOAD  HAS  CRACKLES BILAT  AND P  EDEMA  HAS  REFUSE  CHEST  X  RAY  TWICE   TODAY  HAS NO  LABS  AVAILABLE    WILL  NEED  TO  BE  DIALYZED  TODAY  CALLED THE  NURSING  SUPERVISOR,  SHE  WILL  CALL  THE   ON  CALL  NURSE   IF  LABS   ARE  BEING  REFUSED  BY  THE  PT ,  CAN  DRAW  THEM  PRE  HD,  CBC  RENAL  PROFILE  FOR  REGULAR HD  TOMORROW  AGAIN  CONSENT  FO R HD  TAKEN, IN  CHART  EXPLAINED  TO  THE  PT  AND  ER ATTENDING    TO  BE  ADMITTED  UNDER  THE SERVICE OF  DR QUINONES

## 2019-07-07 NOTE — H&P ADULT - PROBLEM SELECTOR PLAN 1
likely caused by pulmonary edema 2/2 to ESRD  patient refused cxr multiple times in ED  s/p urgent HD on 7/7  will need HD again tomorrow  will attempt cxr again in AM if patient is amenable  nephro following - Dr. Hui

## 2019-07-07 NOTE — H&P ADULT - NSHPPHYSICALEXAM_GEN_ALL_CORE
Vital Signs Last 24 Hrs  T(C): 37.1 (07 Jul 2019 17:40), Max: 37.1 (07 Jul 2019 15:27)  T(F): 98.8 (07 Jul 2019 17:40), Max: 98.8 (07 Jul 2019 17:40)  HR: 93 (07 Jul 2019 17:40) (93 - 95)  BP: 182/109 (07 Jul 2019 17:40) (144/85 - 182/109)  BP(mean): --  RR: 18 (07 Jul 2019 17:40) (18 - 18)  SpO2: 99% (07 Jul 2019 17:40) (98% - 99%)

## 2019-07-07 NOTE — H&P ADULT - ASSESSMENT
38 y/o M with ESRD presents to the ED with shortness of breath x 2 days, likely from fluid overload in setting of ESRD. s/p urgent HD session on admission. Will dialyze again tomorrow as per nephro.    ***Primary team to call pharmacy [facility] to obtain med rec as patient does not remember***

## 2019-07-07 NOTE — ED ADULT NURSE NOTE - ED STAT RN HANDOFF DETAILS
Report given to Kang Adams. Pt at dialysis at this time. After dialysis pt will be transferred to ED holding

## 2019-07-08 ENCOUNTER — TRANSCRIPTION ENCOUNTER (OUTPATIENT)
Age: 39
End: 2019-07-08

## 2019-07-08 VITALS
DIASTOLIC BLOOD PRESSURE: 101 MMHG | HEART RATE: 90 BPM | RESPIRATION RATE: 16 BRPM | SYSTOLIC BLOOD PRESSURE: 183 MMHG | TEMPERATURE: 98 F

## 2019-07-08 LAB
ALBUMIN SERPL ELPH-MCNC: 3.1 G/DL — LOW (ref 3.5–5)
ALP SERPL-CCNC: 106 U/L — SIGNIFICANT CHANGE UP (ref 40–120)
ALT FLD-CCNC: 19 U/L DA — SIGNIFICANT CHANGE UP (ref 10–60)
ANION GAP SERPL CALC-SCNC: 9 MMOL/L — SIGNIFICANT CHANGE UP (ref 5–17)
AST SERPL-CCNC: 17 U/L — SIGNIFICANT CHANGE UP (ref 10–40)
BASOPHILS # BLD AUTO: 0.03 K/UL — SIGNIFICANT CHANGE UP (ref 0–0.2)
BASOPHILS NFR BLD AUTO: 0.3 % — SIGNIFICANT CHANGE UP (ref 0–2)
BILIRUB SERPL-MCNC: 0.5 MG/DL — SIGNIFICANT CHANGE UP (ref 0.2–1.2)
BUN SERPL-MCNC: 42 MG/DL — HIGH (ref 7–18)
CALCIUM SERPL-MCNC: 8.2 MG/DL — LOW (ref 8.4–10.5)
CHLORIDE SERPL-SCNC: 98 MMOL/L — SIGNIFICANT CHANGE UP (ref 96–108)
CHOLEST SERPL-MCNC: 156 MG/DL — SIGNIFICANT CHANGE UP (ref 10–199)
CO2 SERPL-SCNC: 31 MMOL/L — SIGNIFICANT CHANGE UP (ref 22–31)
CREAT SERPL-MCNC: 9.03 MG/DL — HIGH (ref 0.5–1.3)
EOSINOPHIL # BLD AUTO: 0.4 K/UL — SIGNIFICANT CHANGE UP (ref 0–0.5)
EOSINOPHIL NFR BLD AUTO: 4.5 % — SIGNIFICANT CHANGE UP (ref 0–6)
GLUCOSE BLDC GLUCOMTR-MCNC: 122 MG/DL — HIGH (ref 70–99)
GLUCOSE BLDC GLUCOMTR-MCNC: 138 MG/DL — HIGH (ref 70–99)
GLUCOSE BLDC GLUCOMTR-MCNC: 151 MG/DL — HIGH (ref 70–99)
GLUCOSE SERPL-MCNC: 122 MG/DL — HIGH (ref 70–99)
HCT VFR BLD CALC: 29.8 % — LOW (ref 39–50)
HDLC SERPL-MCNC: 42 MG/DL — SIGNIFICANT CHANGE UP
HGB BLD-MCNC: 9 G/DL — LOW (ref 13–17)
IMM GRANULOCYTES NFR BLD AUTO: 0.2 % — SIGNIFICANT CHANGE UP (ref 0–1.5)
IRON SATN MFR SERPL: 12 % — LOW (ref 20–55)
IRON SATN MFR SERPL: 32 UG/DL — LOW (ref 65–170)
LIPID PNL WITH DIRECT LDL SERPL: 87 MG/DL — SIGNIFICANT CHANGE UP
LYMPHOCYTES # BLD AUTO: 1.33 K/UL — SIGNIFICANT CHANGE UP (ref 1–3.3)
LYMPHOCYTES # BLD AUTO: 14.8 % — SIGNIFICANT CHANGE UP (ref 13–44)
MAGNESIUM SERPL-MCNC: 2.2 MG/DL — SIGNIFICANT CHANGE UP (ref 1.6–2.6)
MCHC RBC-ENTMCNC: 28.7 PG — SIGNIFICANT CHANGE UP (ref 27–34)
MCHC RBC-ENTMCNC: 30.2 GM/DL — LOW (ref 32–36)
MCV RBC AUTO: 94.9 FL — SIGNIFICANT CHANGE UP (ref 80–100)
MONOCYTES # BLD AUTO: 0.96 K/UL — HIGH (ref 0–0.9)
MONOCYTES NFR BLD AUTO: 10.7 % — SIGNIFICANT CHANGE UP (ref 2–14)
NEUTROPHILS # BLD AUTO: 6.23 K/UL — SIGNIFICANT CHANGE UP (ref 1.8–7.4)
NEUTROPHILS NFR BLD AUTO: 69.5 % — SIGNIFICANT CHANGE UP (ref 43–77)
NRBC # BLD: 0 /100 WBCS — SIGNIFICANT CHANGE UP (ref 0–0)
PHOSPHATE SERPL-MCNC: 6.6 MG/DL — HIGH (ref 2.5–4.5)
PLATELET # BLD AUTO: 276 K/UL — SIGNIFICANT CHANGE UP (ref 150–400)
POTASSIUM SERPL-MCNC: 4.2 MMOL/L — SIGNIFICANT CHANGE UP (ref 3.5–5.3)
POTASSIUM SERPL-SCNC: 4.2 MMOL/L — SIGNIFICANT CHANGE UP (ref 3.5–5.3)
PROT SERPL-MCNC: 7.2 G/DL — SIGNIFICANT CHANGE UP (ref 6–8.3)
RBC # BLD: 3.14 M/UL — LOW (ref 4.2–5.8)
RBC # FLD: 16.8 % — HIGH (ref 10.3–14.5)
SODIUM SERPL-SCNC: 138 MMOL/L — SIGNIFICANT CHANGE UP (ref 135–145)
TIBC SERPL-MCNC: 257 UG/DL — SIGNIFICANT CHANGE UP (ref 250–450)
TOTAL CHOLESTEROL/HDL RATIO MEASUREMENT: 3.7 RATIO — SIGNIFICANT CHANGE UP (ref 3.4–9.6)
TRIGL SERPL-MCNC: 137 MG/DL — SIGNIFICANT CHANGE UP (ref 10–149)
TSH SERPL-MCNC: 1.75 UU/ML — SIGNIFICANT CHANGE UP (ref 0.34–4.82)
UIBC SERPL-MCNC: 225 UG/DL — SIGNIFICANT CHANGE UP (ref 110–370)
WBC # BLD: 8.97 K/UL — SIGNIFICANT CHANGE UP (ref 3.8–10.5)
WBC # FLD AUTO: 8.97 K/UL — SIGNIFICANT CHANGE UP (ref 3.8–10.5)

## 2019-07-08 PROCEDURE — 84443 ASSAY THYROID STIM HORMONE: CPT

## 2019-07-08 PROCEDURE — 84100 ASSAY OF PHOSPHORUS: CPT

## 2019-07-08 PROCEDURE — 93005 ELECTROCARDIOGRAM TRACING: CPT

## 2019-07-08 PROCEDURE — 80048 BASIC METABOLIC PNL TOTAL CA: CPT

## 2019-07-08 PROCEDURE — 85730 THROMBOPLASTIN TIME PARTIAL: CPT

## 2019-07-08 PROCEDURE — 82962 GLUCOSE BLOOD TEST: CPT

## 2019-07-08 PROCEDURE — 83550 IRON BINDING TEST: CPT

## 2019-07-08 PROCEDURE — 99285 EMERGENCY DEPT VISIT HI MDM: CPT | Mod: 25

## 2019-07-08 PROCEDURE — 85027 COMPLETE CBC AUTOMATED: CPT

## 2019-07-08 PROCEDURE — G0257: CPT

## 2019-07-08 PROCEDURE — 94640 AIRWAY INHALATION TREATMENT: CPT

## 2019-07-08 PROCEDURE — 82746 ASSAY OF FOLIC ACID SERUM: CPT

## 2019-07-08 PROCEDURE — 85610 PROTHROMBIN TIME: CPT

## 2019-07-08 PROCEDURE — 82306 VITAMIN D 25 HYDROXY: CPT

## 2019-07-08 PROCEDURE — 99261: CPT

## 2019-07-08 PROCEDURE — 84466 ASSAY OF TRANSFERRIN: CPT

## 2019-07-08 PROCEDURE — 82728 ASSAY OF FERRITIN: CPT

## 2019-07-08 PROCEDURE — 83036 HEMOGLOBIN GLYCOSYLATED A1C: CPT

## 2019-07-08 PROCEDURE — 80061 LIPID PANEL: CPT

## 2019-07-08 PROCEDURE — 36415 COLL VENOUS BLD VENIPUNCTURE: CPT

## 2019-07-08 PROCEDURE — 83540 ASSAY OF IRON: CPT

## 2019-07-08 PROCEDURE — 80053 COMPREHEN METABOLIC PANEL: CPT

## 2019-07-08 PROCEDURE — 82607 VITAMIN B-12: CPT

## 2019-07-08 PROCEDURE — 83735 ASSAY OF MAGNESIUM: CPT

## 2019-07-08 RX ORDER — ERYTHROPOIETIN 10000 [IU]/ML
4000 INJECTION, SOLUTION INTRAVENOUS; SUBCUTANEOUS
Refills: 0 | Status: DISCONTINUED | OUTPATIENT
Start: 2019-07-08 | End: 2019-07-08

## 2019-07-08 RX ORDER — IPRATROPIUM/ALBUTEROL SULFATE 18-103MCG
3 AEROSOL WITH ADAPTER (GRAM) INHALATION ONCE
Refills: 0 | Status: COMPLETED | OUTPATIENT
Start: 2019-07-08 | End: 2019-07-08

## 2019-07-08 RX ORDER — INSULIN GLARGINE 100 [IU]/ML
20 INJECTION, SOLUTION SUBCUTANEOUS
Qty: 0 | Refills: 0 | DISCHARGE

## 2019-07-08 RX ADMIN — Medication 5 UNIT(S): at 14:53

## 2019-07-08 RX ADMIN — Medication 50 MILLIGRAM(S): at 05:38

## 2019-07-08 RX ADMIN — Medication 50 MILLIGRAM(S): at 16:09

## 2019-07-08 RX ADMIN — Medication 3 MILLILITER(S): at 05:37

## 2019-07-08 RX ADMIN — Medication 5 UNIT(S): at 09:13

## 2019-07-08 RX ADMIN — HEPARIN SODIUM 5000 UNIT(S): 5000 INJECTION INTRAVENOUS; SUBCUTANEOUS at 05:38

## 2019-07-08 RX ADMIN — INSULIN GLARGINE 15 UNIT(S): 100 INJECTION, SOLUTION SUBCUTANEOUS at 01:13

## 2019-07-08 RX ADMIN — ERYTHROPOIETIN 4000 UNIT(S): 10000 INJECTION, SOLUTION INTRAVENOUS; SUBCUTANEOUS at 16:49

## 2019-07-08 NOTE — PROGRESS NOTE ADULT - SUBJECTIVE AND OBJECTIVE BOX
HPI:  40 y/o male with ESRD on MWF HD (via LUE AVF), DM (on insulin), on home O2 (4L NC) and HTN presents to the ED with chief complaint of shortness of breath x2 days. Limited history is provided by patient as he is very dismissive and does not want to be disturbed. At present, he is able to convey that his only symptom is that he is short of breath. He is in no acute distress. (07 Jul 2019 20:13)      Patient is a 39y old  Male who presents with a chief complaint of sob (07 Jul 2019 20:13)      INTERVAL HPI/OVERNIGHT EVENTS:  T(C): 37.6 (07-08-19 @ 11:25), Max: 37.6 (07-08-19 @ 11:25)  HR: 91 (07-08-19 @ 11:25) (79 - 95)  BP: 141/81 (07-08-19 @ 11:25) (141/81 - 182/109)  RR: 18 (07-08-19 @ 11:25) (17 - 20)  SpO2: 100% (07-08-19 @ 11:25) (98% - 100%)  Wt(kg): --  I&O's Summary      REVIEW OF SYSTEMS: denies fever, chills, SOB, palpitations, chest pain, abdominal pain, nausea, vomitting, diarrhea, constipation, dizziness    MEDICATIONS  (STANDING):  epoetin cyndie Injectable 4000 Unit(s) IV Push <User Schedule>  heparin  Injectable 5000 Unit(s) SubCutaneous every 12 hours  hydrALAZINE 50 milliGRAM(s) Oral every 8 hours  insulin glargine Injectable (LANTUS) 15 Unit(s) SubCutaneous at bedtime  insulin lispro Injectable (HumaLOG) 5 Unit(s) SubCutaneous three times a day before meals    MEDICATIONS  (PRN):      PHYSICAL EXAM:  GENERAL: NAD, well-groomed, well-developed  HEAD:  Atraumatic, Normocephalic  EYES: EOMI, PERRLA, conjunctiva and sclera clear  ENMT: No tonsillar erythema, exudates, or enlargement; Moist mucous membranes, Good dentition, No lesions  NECK: Supple, No JVD, Normal thyroid  NERVOUS SYSTEM:  Alert & Oriented X3, Good concentration; Motor Strength 5/5 B/L upper and lower extremities; DTRs 2+ intact and symmetric  CHEST/LUNG: Clear to percussion bilaterally; No rales, rhonchi, wheezing, or rubs  HEART: Regular rate and rhythm; No murmurs, rubs, or gallops  ABDOMEN: Soft, Nontender, Nondistended; Bowel sounds present  EXTREMITIES:  2+ Peripheral Pulses, No clubbing, cyanosis, or edema  LYMPH: No lymphadenopathy noted  SKIN: No rashes or lesions  LABS:                        9.3    10.65 )-----------( 318      ( 07 Jul 2019 17:47 )             30.9     07-07    138  |  101  |  39<H>  ----------------------------<  139<H>  4.2   |  28  |  8.99<H>    Ca    8.4      07 Jul 2019 17:47      PT/INR - ( 07 Jul 2019 17:47 )   PT: 12.1 sec;   INR: 1.09 ratio         PTT - ( 07 Jul 2019 17:47 )  PTT:34.1 sec    CAPILLARY BLOOD GLUCOSE      POCT Blood Glucose.: 151 mg/dL (08 Jul 2019 11:55)  POCT Blood Glucose.: 122 mg/dL (08 Jul 2019 08:09)  POCT Blood Glucose.: 138 mg/dL (08 Jul 2019 01:05)  POCT Blood Glucose.: 99 mg/dL (07 Jul 2019 23:20)

## 2019-07-08 NOTE — PROGRESS NOTE ADULT - ASSESSMENT
seen and examined  vsstable afebrile   pt was sent for urgent hemodialysis by nephro,  pt was sob day before night ( uses O2 at home  recently had sleep studies  following Dr branham  no bipap yet   pt denies CP or palp.  pt denies taht drinks lot of fluids  BUT CHEWS LOT OF ICE  ADVISED IS SAME THING.  pt received HD yesterday  doing ok no sob  refused for cxr   vsstable afebrile physical unchanged afe b/b crackles   lab snoted   a/p due for HD today   but refusing  will d/w nephro.  htn, dm, bipolar cont same   has cocain abuse history  this time utox not collected  morbid obesity  wt reduction

## 2019-07-08 NOTE — DISCHARGE NOTE PROVIDER - HOSPITAL COURSE
40 y/o male with ESRD on MWF HD (via LUE AVF), DM (on insulin), on home O2 (4L NC) and HTN presents to the ED with chief complaint of shortness of breath x2 days.    Patient admitted for dyspnea  likely caused by pulmonary edema 2/2 to ESRD likely due to non compliance with diet, fluid allowance and HD schedule     patient refused cxr multiple times during this admission     Dr. Hui nephrologist consulted     Received  urgent HD on 7/7. Another  HD was recommended today 7/8 however patient declined 38 y/o male with ESRD on MWF HD (via LUE AVF), DM (on insulin), on home O2 (4L NC) and HTN presents to the ED with chief complaint of shortness of breath x2 days.    Patient admitted for dyspnea  likely caused by pulmonary edema 2/2 to ESRD likely due to non compliance with diet, fluid allowance and HD schedule     patient refused cxr multiple times during this admission     Dr. Hui/ Romulo nephrologist consulted     Received  urgent HD on 7/7 and additional session today     Patient verbalizes feeling better. Denies chest pain, SOB. wishes to go home today. Pt cleared to be discharged home as per Dr Sebastian.     Pt recommended to follow up with HD schedule, continue low fat, low salt, low sugar diet and follow up with PCP within one week

## 2019-07-08 NOTE — CHART NOTE - NSCHARTNOTEFT_GEN_A_CORE
EVENT: Wheezing    OBJECTIVE:  Vital Signs Last 24 Hrs  T(C): 36.7 (08 Jul 2019 00:12), Max: 37.1 (07 Jul 2019 15:27)  T(F): 98 (08 Jul 2019 00:12), Max: 98.8 (07 Jul 2019 17:40)  HR: 79 (08 Jul 2019 00:12) (79 - 95)  BP: 154/95 (08 Jul 2019 00:12) (144/85 - 182/109)  BP(mean): --  RR: 18 (08 Jul 2019 00:12) (17 - 18)  SpO2: 98% (08 Jul 2019 00:12) (98% - 100%)    FOCUSED PHYSICAL EXAM:  Neuro: awake, alert, oriented x 3. No neuro deficit  Cardiovascular: Pulses +2 B/L in lower and upper extremities, HR regular, BP stable, No edema.  Respiratory: Respirations regular, + wheezing in BLL  GI: Abdomen soft, non-tender, positive bowel sounds.  : no bladder distention noted. No complaints at this time.  Skin: Dry, intact, no bruising, no diaphoresis.    LABS:                        9.3    10.65 )-----------( 318      ( 07 Jul 2019 17:47 )             30.9     07-07    138  |  101  |  39<H>  ----------------------------<  139<H>  4.2   |  28  |  8.99<H>    Ca    8.4      07 Jul 2019 17:47        EKG:   IMGAGING:    ASSESSMENT:  HPI:  38 y/o male with ESRD on MWF HD (via LUE AVF), DM (on insulin), on home O2 (4L NC) and HTN presents to the ED with chief complaint of shortness of breath x2 days. Limited history is provided by patient as he is very dismissive and does not want to be disturbed. At present, he is able to convey that his only symptom is that he is short of breath. He is in no acute distress. (07 Jul 2019 20:13)      PLAN: Duoneb x 1 dose ordered

## 2019-07-08 NOTE — PROGRESS NOTE ADULT - SUBJECTIVE AND OBJECTIVE BOX
[facility] : Progress Note :: [phone], (office [phone]),   Dr La / Dr Hui / Dr Barber / Dr Villalobos / Dr Hang CASTELLANO / Dr Mehta / Dr Sanchez / Dr Alber dumont  _____________________________________________________________________________________________    was refusing HD earlier.    aspirin (Short breath)  aspirin (Swelling)  penicillin (Short breath)  penicillins (Swelling)  shellfish (Unknown)    Hospital Medications:   MEDICATIONS  (STANDING):  epoetin cyndie Injectable 4000 Unit(s) IV Push <User Schedule>  heparin  Injectable 5000 Unit(s) SubCutaneous every 12 hours  hydrALAZINE 50 milliGRAM(s) Oral every 8 hours  insulin glargine Injectable (LANTUS) 15 Unit(s) SubCutaneous at bedtime  insulin lispro Injectable (HumaLOG) 5 Unit(s) SubCutaneous three times a day before meals        VITALS:  T(F): 97.5 (07-08-19 @ 16:30), Max: 99.7 (07-08-19 @ 11:25)  HR: 88 (07-08-19 @ 16:30)  BP: 190/118 (07-08-19 @ 16:30)  RR: 18 (07-08-19 @ 16:30)  SpO2: 100% (07-08-19 @ 15:00)  Wt(kg): --      PHYSICAL EXAM:  Constitutional: NAD  HEENT: anicteric sclera, oropharynx clear.  Neck: No JVD  Respiratory: CTAB, no wheezes, rales or rhonchi  Cardiovascular: S1, S2, RRR  Gastrointestinal: BS+, soft, NT/ND  Extremities: No peripheral edema  Neurological: A/O x 3, no focal deficits  Vascular Access:LUEAVF with thrill and bruit    LABS:  07-08    138  |  98  |  42<H>  ----------------------------<  122<H>  4.2   |  31  |  9.03<H>    Ca    8.2<L>      08 Jul 2019 16:36  Phos  6.6     07-08  Mg     2.2     07-08    TPro  7.2  /  Alb  3.1<L>  /  TBili  0.5  /  DBili      /  AST  17  /  ALT  19  /  AlkPhos  106  07-08    Creatinine Trend: 9.03 <--, 8.99 <--                        9.0    8.97  )-----------( 276      ( 08 Jul 2019 16:36 )             29.8     Urine Studies:      RADIOLOGY & ADDITIONAL STUDIES:

## 2019-07-08 NOTE — DISCHARGE NOTE NURSING/CASE MANAGEMENT/SOCIAL WORK - NSDCDPATPORTLINK_GEN_ALL_CORE
You can access the [facility], offered by [facility], by registering with the following website: [email or web address]

## 2019-07-08 NOTE — PROGRESS NOTE ADULT - ASSESSMENT
# ESRD. admitted wit hypervolemia.  s/p HD yesterday. HD today  # chronic hypoxia. s/p sleep studies. F/U results as outpatient.  # HTN controlled   # anemia of CKD on procrit.  D/C planning.

## 2019-07-08 NOTE — DISCHARGE NOTE PROVIDER - NSDCCPCAREPLAN_GEN_ALL_CORE_FT
PRINCIPAL DISCHARGE DIAGNOSIS  Diagnosis: Dyspnea  Assessment and Plan of Treatment: You were admitted for difficulties breathing likely due to non complience with diet and fluid restriction   You received wo sessions of hemodialysis   Please continue your hemodialysis as schesule and follow up with your doctor for reevaluation within one week      SECONDARY DISCHARGE DIAGNOSES  Diagnosis: HTN (hypertension)  Assessment and Plan of Treatment: Recommend the DASH Diet. This diet emphasizes vegetables, fruits, and fat-free or low-fat dairy products.  Includes whole grains, fish, poultry, beans, seeds, nuts, and vegetable oils. Please limit sodium, sweets, sugary beverages, and red meats.  Patient oriented on diet and refers to understand.    Diagnosis: Bipolar disorder  Assessment and Plan of Treatment: Continue all your medications as prescribed by your doctor   Follow up with your doctor for further management    Diagnosis: ESRD on dialysis  Assessment and Plan of Treatment: Please make sure your follow HD schedule as recommended by your kidney doctor    Diagnosis: DM (diabetes mellitus)  Assessment and Plan of Treatment: Continue all your medications and low carbohydrates diet   Follow up with your doctor for further management

## 2019-07-09 LAB
24R-OH-CALCIDIOL SERPL-MCNC: 29.5 NG/ML — LOW (ref 30–80)
FERRITIN SERPL-MCNC: 604 NG/ML — HIGH (ref 30–400)
FOLATE SERPL-MCNC: 10.4 NG/ML — SIGNIFICANT CHANGE UP
HBA1C BLD-MCNC: 5.6 % — SIGNIFICANT CHANGE UP (ref 4–5.6)
TRANSFERRIN SERPL-MCNC: 204 MG/DL — SIGNIFICANT CHANGE UP (ref 200–360)
VIT B12 SERPL-MCNC: 919 PG/ML — SIGNIFICANT CHANGE UP (ref 232–1245)

## 2019-07-18 ENCOUNTER — APPOINTMENT (OUTPATIENT)
Dept: ENDOVASCULAR SURGERY | Facility: CLINIC | Age: 39
End: 2019-07-18
Payer: MEDICARE

## 2019-07-18 VITALS
DIASTOLIC BLOOD PRESSURE: 88 MMHG | SYSTOLIC BLOOD PRESSURE: 180 MMHG | HEART RATE: 93 BPM | TEMPERATURE: 98.2 F | BODY MASS INDEX: 36.45 KG/M2 | WEIGHT: 315 LBS | HEIGHT: 78 IN | RESPIRATION RATE: 20 BRPM

## 2019-07-18 PROCEDURE — 36902Z: CUSTOM

## 2019-07-18 NOTE — ASSESSMENT
[FreeTextEntry1] : PT with ESRD on HD with pain at LUE access site for fistulogram.  Consent obtained.\par \par Fistulogram demonstrates stenosis in cephalic arch stent PTA to 12 with good result.  EBL=5 cc.  Full report to follow.

## 2019-07-18 NOTE — HISTORY OF PRESENT ILLNESS
[FreeTextEntry1] : feels ok\par no SOB [FreeTextEntry4] : yesterday [FreeTextEntry5] : yesterday 10pm [FreeTextEntry6] : Dr. Jimenez

## 2019-07-18 NOTE — PROCEDURE
[D/C IV on discharge] : D/C IV on discharge [Resume diet] : resume diet [Site check for bleeding/hematoma/thrill/bruit] : Site check for bleeding/hematoma/thrill/bruit [Vital signs on admission the q 15 mins x2] : Vital signs on admission the q 15 mins x2 [FreeTextEntry1] : left arm fistula fistulogram/

## 2019-07-18 NOTE — REASON FOR VISIT
[Other ___] : a [unfilled] visit for [FreeTextEntry2] : pain in venous site/swelling at site (no infiltration)  since yesterday

## 2019-07-18 NOTE — PAST MEDICAL HISTORY
[Increasing age ( >40 years old)] : Increasing age ( >40 years old) [Obesity: BMI >25] : Obesity: BMI >25 [FreeTextEntry1] : Malignant Hyperthermia Screening Tool and Risk of Bleeding Assessment\par \par Mr. PRESTON JOHNSON denies family history of unexpected death following Anesthesia or Exercise.\par Denies Family history of Malignant Hyperthermia, Muscle or Neuromuscular disorder and High Temperature following exercise.\par \par Mr. PRESTON JOHNSON denies history of Muscle Spasm, Dark or Chocolate - Colored urine and Unanticipated fever immediately following anesthesia or serious exercise. \par Mr. JOHNSON also denies bleeding tendencies/ Risks of Bleeding.\par

## 2019-07-22 ENCOUNTER — APPOINTMENT (OUTPATIENT)
Dept: ORTHOPEDIC SURGERY | Facility: CLINIC | Age: 39
End: 2019-07-22

## 2019-07-22 NOTE — DISCUSSION/SUMMARY
[de-identified] : Left knee patella tendinitis\par Left knee osteoarthritic changes\par \par \par Discussed my findings and history exam and radiology\par \par PT\par \par weight loss\par \par no nsaids\par \par Procedure Note:\par \par Monovisc x1\par [id number] expiration 6-30-20\par \par Risks and benefits of an arthrocentesis and intraarticular hyaluronic injection of the LEFT knee were discussed with the patient. Potential adverse effects were discussed including but not limited to bleeding, skin/ joint infection, local skin reactions including bleaching, bruising, stiffness, soreness, vasovagal episodes, transient hyperglycemia, avascular necrosis, pseudo-septic type reactions, post injection joint pain, allergic reaction to product or anesthetic and other rare but potential adverse effects along with benefits including decreased pain and improved stability prior to obtaining verbal informed consent. It was also discussed that for some patients the treatment is ineffective and there are no guarantees that the patient will experience improvement as the result of the injection. In rare occasions the injection can cause worsening of pain.\par \par The use of a Sonosite 15-6 MHz linear transducer with live ultrasound guidance of the knee was necessary given the patient's BMI and local body habitus overlying and obscuring the accurate identification of normal body bony anatomy used to identify the injection site and the depth of soft tissue envelope necessitating a longer than normal needle to reach the joint space, and to confirm the location of the needle tip and intra-articular delivery of the medication. Without the use of live ultrasound guidance the injection would have been more difficult and place the patient's neurovascular structures at risk from the longer needle needed to traverse the soft tissue envelope.\par \par A 6 inch bolster was placed underneath the knee to place the LEFT knee in a slightly flexed position. The superolateral injection site was identified using the ultrasound probe to identify the suprapatellar space and superior boarder of the patella first longitudinally and then transversely. The injection site was marked and prepped with a ChloraPrep swab and anesthetized with ethylchloride skin anesthesia. Under sterile technique a 20g 3 1/2 in spinal needle with a preloaded [ ] syringe was passed through the injection site towards the suprapatellar space under live ultrasound guidance and noted to penetrate the joint capsule. The medication was injected without resistance under live ultrasound visualization and noted to flow into the suprapatellar joint space. The injection site was sterilely dressed, there was minimal blood loss. The patient tolerated this procedure without any complications done by myself. Images were recorded and saved.\par \par The patient has been advised that if they notice any worsening of symptoms or any problems to contact me and seek care from a qualified medical professional. The patient was instructed to ice the knee and take NSAID medication on an as needed basis if the patient feels discomfort.\par \par Followup p.r.n.

## 2019-07-22 NOTE — PHYSICAL EXAM
[de-identified] : Physical Examination\par General: well nourished, in no acute distress, alert and oriented to person, place and time\par Psychiatric: normal mood and affect, no abnormal movements or speech patterns\par Eyes: vision intact - glasses\par Throat: no thyromegaly\par Lymph: no enlarged nodes, no lymphedema in extremity\par Respiratory: no wheezing, no shortness of breath with ambulation\par Cardiac: no cardiac leg swelling, 2+ peripheral pulses\par Neurology: normal gross sensation in extremities to light touch\par Abdomen: soft, non-tender, tympanic, no masses\par \par Musculoskeletal Examination\par Ambulation	+ antalgic gait, + assistive devices\par \par Knee			Right			Left\par General\par      Swelling/Deformity	normal			normal	\par      Skin			chronic skin changes and significant edema bilateral lower extremities\par      Erythema		-			-\par      Standing Alignment	neutral			neutral\par      Effusion		none			none\par Range of Motion\par      Hip			full painless ROM		full painless ROM\par      Knee Flexion		90			30\par      Knee Extension	0			10\par Patella\par      Faustino's		-			+\par      Grind Sign		-			+\par      Crepitus		-			+\par Palpation\par      Medial Joint Line	-			+\par      Medial Fem Condyle	-			+ artic surface\par      Lateral Joint Line	-			+\par      Quad Tendon		-			+\par      Patella Tendon	-			++ throughout karan insertion\par      Medial Patella		-			+\par      Lateral Patella 	-			+\par      Posterior Knee	-			+\par Ligamentous\par      Varus @ 0° / 30°	-/-			-/-\par      Valgus @ 0° / 30°	-/-			-/-\par Strength Examination/Atrophy\par      Hip Flexors 		5+			5+\par      Quadriceps		5+			5+\par      Hamstring		5+			5+\par      Tibialis Anterior	5+			5+\par      Achilles/Soleus	5+			5+\par Sensation\par      Deep Peroneal	normal			normal\par      Superficial Peroneal 	normal			normal\par      Sural  		normal			normal\par      Posterior Tibial 	normal			normal\par      Saphneous 		normal			normal\par Pulses\par      DP			2+			2+\par  [de-identified] : 5 views of the affected Left knee (standing AP, flexing standing AP, 30degree flexed lateral, 0degree lateral, sunrise view)\par were ordered, obtained and evaluated by myself today and\par demonstrate:\par There is no narrowing\par Trace osteophytic lipping\par no suprapatellar effusion\par Trace patellofemoral joint space loss without evidence of tilt [or] subluxation on sunrise view\par Normal soft tissue density\par Otherwise normal osseous bone structure without fracture or dislocation\par \par \par MRI Left knee [facility] on 5-30-19\par My impression of the images:\par Quality of the MRI is  very poor\par Medial Meniscus difficult to assess but no gross meniscus pathology noted\par Lateral Meniscus no gross meniscus pathology noted however fluid between the femur and tibia noted suggestive of possible chondral thinning\par There is difficult to assess chondral loss in the tricompartments\par There is [Not] bone marrow edema[/subchondral cysts] in the tricompartments\par LCL is intact\par MCL is intact\par ACL is intact\par PCL is intact \par Quadriceps Tendon is intact without significant edema\par Patella Tendon is intact without significant edema\par \par The Final Radiologist Impression:\par The study was significantly limited due to the patient's clinical status. \par The patient was unable to tolerate sagittal imaging, limiting evaluation. \par \par Redemonstrated diffuse subcutaneous edema with scattered fluid in the \par subcutaneous tissues about the knee. No discrete drainable fluid collection \par is seen in the soft tissues. There is small knee joint effusion \par redemonstrated. The ACL and PCL appear grossly intact, evaluation limited \par without sagittal imaging. No definite tear of the medial collateral ligament \par or lateral collateral ligament complex. No abnormal bone marrow edema to \par suggest acute fracture. There may be thinning of cartilage in the \par patellofemoral compartment, not well profiled. Evaluation for meniscus tear \par is limited. No parameniscal cysts are seen. \par \par IMPRESSION: Redemonstrated nonspecific diffuse subcutaneous edema with \par scattered fluid in the subcutaneous tissues about the knee. Correlate \par clinically to exclude cellulitis. No discrete drainable fluid collection is \par seen in the soft tissues. Small knee joint effusion redemonstrated. \par

## 2019-07-22 NOTE — HISTORY OF PRESENT ILLNESS
[de-identified] : CC Left knee\par \par HPI 40 yo male right HD presents with chronic onset of any areas of constant pain in the anterior Left knee [without injury]. The pain is worse, and rated a 10 out of 10, described as sharp, [without radiation]. Nothing makes the pain better and everything makes the pain worse. The patient reports associated symptoms of pop click instability and weakness. The patient + pain at night affecting sleep, and + similar pain previously.\par \par The patient has tried the following treatments:\par Activity modification	+\par Ice/Compression  	+\par Braces    		-\par Nsaids    		- end-stage renal unable to take\par Physical Therapy 	+ many years ago without improvement\par Cortisone Injection	- cannot take his diabetic\par Visco Injection		-\par Arthroscopy		-\par \par Review of Systems is positive for the above musculoskeletal symptoms and is otherwise non-contributory for general, constitutional, psychiatric, neurologic, HEENT, cardiac, respiratory, gastrointestinal, reproductive, lymphatic, and dermatologic complaints.\par \par Consult by Dr art \par

## 2019-07-26 ENCOUNTER — APPOINTMENT (OUTPATIENT)
Dept: VASCULAR SURGERY | Facility: CLINIC | Age: 39
End: 2019-07-26
Payer: MEDICARE

## 2019-07-26 PROCEDURE — 99214 OFFICE O/P EST MOD 30 MIN: CPT

## 2019-07-26 PROCEDURE — 93990 DOPPLER FLOW TESTING: CPT

## 2019-07-26 NOTE — PHYSICAL EXAM
[Thrill] : thrill [Aneurysm] : aneurysm [Bleeding] : no bleeding [Normal] : coordination grossly intact, no focal deficits

## 2019-07-26 NOTE — ASSESSMENT
[FreeTextEntry1] : Patient with renal failure on hemodialysis with severe pain giving access for dialysis. No significant stenosis noted. I made recommendation regarding the site of needles. Followup next week

## 2019-07-30 ENCOUNTER — APPOINTMENT (OUTPATIENT)
Dept: ENDOVASCULAR SURGERY | Facility: CLINIC | Age: 39
End: 2019-07-30
Payer: MEDICARE

## 2019-07-30 NOTE — PROCEDURE
[FreeTextEntry1] : left arm fistula fistulogram/ [D/C IV on discharge] : D/C IV on discharge [Resume diet] : resume diet [Site check for bleeding/hematoma/thrill/bruit] : Site check for bleeding/hematoma/thrill/bruit [Vital signs on admission the q 15 mins x2] : Vital signs on admission the q 15 mins x2

## 2019-07-30 NOTE — HISTORY OF PRESENT ILLNESS
[FreeTextEntry1] : still c/o pain in his access\par  poor clearance per dialysis [FreeTextEntry6] : Dr. Jimenez

## 2019-08-05 ENCOUNTER — EMERGENCY (EMERGENCY)
Facility: HOSPITAL | Age: 39
LOS: 1 days | Discharge: HOME | End: 2019-08-05
Attending: EMERGENCY MEDICINE
Payer: MEDICARE

## 2019-08-05 ENCOUNTER — FORM ENCOUNTER (OUTPATIENT)
Age: 39
End: 2019-08-05

## 2019-08-05 VITALS
WEIGHT: 315 LBS | HEIGHT: 78 IN | TEMPERATURE: 98 F | DIASTOLIC BLOOD PRESSURE: 99 MMHG | HEART RATE: 88 BPM | OXYGEN SATURATION: 100 % | RESPIRATION RATE: 16 BRPM | SYSTOLIC BLOOD PRESSURE: 171 MMHG

## 2019-08-05 VITALS
RESPIRATION RATE: 16 BRPM | DIASTOLIC BLOOD PRESSURE: 88 MMHG | HEART RATE: 84 BPM | SYSTOLIC BLOOD PRESSURE: 176 MMHG | OXYGEN SATURATION: 100 % | TEMPERATURE: 98 F

## 2019-08-05 DIAGNOSIS — Z98.890 OTHER SPECIFIED POSTPROCEDURAL STATES: Chronic | ICD-10-CM

## 2019-08-05 PROCEDURE — 99284 EMERGENCY DEPT VISIT MOD MDM: CPT | Mod: 25

## 2019-08-05 PROCEDURE — 93970 EXTREMITY STUDY: CPT

## 2019-08-05 PROCEDURE — 99283 EMERGENCY DEPT VISIT LOW MDM: CPT

## 2019-08-05 PROCEDURE — 93970 EXTREMITY STUDY: CPT | Mod: 26

## 2019-08-05 RX ORDER — OXYCODONE AND ACETAMINOPHEN 5; 325 MG/1; MG/1
1 TABLET ORAL ONCE
Refills: 0 | Status: DISCONTINUED | OUTPATIENT
Start: 2019-08-05 | End: 2019-08-05

## 2019-08-05 RX ADMIN — OXYCODONE AND ACETAMINOPHEN 1 TABLET(S): 5; 325 TABLET ORAL at 10:17

## 2019-08-05 NOTE — ED PROVIDER NOTE - CLINICAL SUMMARY MEDICAL DECISION MAKING FREE TEXT BOX
Pt without SOB or CP and no e/o pulmonary edema to suggest current fluid overload or PE. No e/o LE cellulitis/nec fasc or PAD, no e/o compartment syndrome. Character of low suspicion for CVA and no e/o intrinsic LE weakness. Noted past US's of LLE, another one today _____. Pt offered Tylenol, states this will not help, given Percocet. Pt without SOB or CP and no e/o pulmonary edema to suggest current fluid overload or PE. No e/o LE cellulitis/nec fasc or PAD, no e/o compartment syndrome. Character of low suspicion for CVA and no e/o intrinsic LE weakness. Noted past US's of LLE, another one today negative. Pt offered Tylenol, states this will not help, given Percocet. D/w Dr. La who will arrange dialysis later today. Patient is well appearing, NAD, afebrile, hemodynamically stable. Discharged with instructions in further symptomatic care, return precautions, and need for dialysis f/u.

## 2019-08-05 NOTE — ED PROVIDER NOTE - OBJECTIVE STATEMENT
39yoM with h/o ESRD on HD MWF, HTN, DM, morbid obesity, bipolar, schizophrenia, presents with LLE pain and swelling. States he awoke this morning with this pain and swelling heaviness to his entire leg from hip down. States he has never had pain like that in this leg before, has never had u/l leg pain. Missed his 5AM dialysis today. Missed his Friday dialysis as well, went on Saturday instead. Denies CP, SOB, fever, increased swelling of his R leg, or any other symptoms. Pt well known to this ED and inpatient team for missed dialysis and LLE pain, negative DVT U/S x2 in May this year.

## 2019-08-05 NOTE — ED PROVIDER NOTE - PROGRESS NOTE DETAILS
D/w Dr. La who will arrange for pt to have dialysis today, and will call him today to make arrangements for this. Pt comfortable with this, requesting food. No increased WOB.

## 2019-08-05 NOTE — ED PROVIDER NOTE - PHYSICAL EXAMINATION
Afebrile, hemodynamically stable, saturating well  NAD, well appearing, no increased WOB  Head NCAT  EOMI grossly, anicteric  MMM  No JVD  RRR, nml S1/S2, no m/r/g  Lungs CTAB, no w/r/r  Abd soft, NT, obese, nml BS, no rebound or guarding  AAO, CN's 3-12 grossly intact  B/l LE woody edema, R circumference mildly greater than L, symmetric warmth/color/sensation, toe wiggling  Skin warm, dry

## 2019-08-06 ENCOUNTER — APPOINTMENT (OUTPATIENT)
Dept: ENDOVASCULAR SURGERY | Facility: CLINIC | Age: 39
End: 2019-08-06
Payer: MEDICARE

## 2019-08-06 VITALS
RESPIRATION RATE: 22 BRPM | TEMPERATURE: 98.7 F | DIASTOLIC BLOOD PRESSURE: 99 MMHG | HEART RATE: 82 BPM | OXYGEN SATURATION: 100 % | HEIGHT: 78 IN | SYSTOLIC BLOOD PRESSURE: 188 MMHG | BODY MASS INDEX: 36.45 KG/M2 | WEIGHT: 315 LBS

## 2019-08-06 PROCEDURE — 36903Z: CUSTOM

## 2019-08-07 NOTE — REASON FOR VISIT
[Other ___] : a [unfilled] visit for [Difficult Cannulation] : difficult cannulation [Inadequate Flow within AVF] : inadequate flow within AVF [FreeTextEntry2] : pain in access

## 2019-08-07 NOTE — HISTORY OF PRESENT ILLNESS
[] : left brachiocephalic fistula [FreeTextEntry1] : 2016  in [place] [FreeTextEntry4] : yesterday [FreeTextEntry6] : Dr. Jimenez [FreeTextEntry5] : yesterday 10pm

## 2019-08-07 NOTE — PROCEDURE
[Site check for bleeding/hematoma/thrill/bruit] : Site check for bleeding/hematoma/thrill/bruit [Resume diet] : resume diet [Vital signs on admission the q 15 mins x2] : Vital signs on admission the q 15 mins x2 [FreeTextEntry1] : left arm fistula fistulogram/angioplasty/stent

## 2019-08-16 ENCOUNTER — INPATIENT (INPATIENT)
Facility: HOSPITAL | Age: 39
LOS: 3 days | Discharge: HOME | DRG: 640 | End: 2019-08-20
Attending: INTERNAL MEDICINE | Admitting: INTERNAL MEDICINE
Payer: MEDICARE

## 2019-08-16 VITALS
HEART RATE: 86 BPM | TEMPERATURE: 98 F | SYSTOLIC BLOOD PRESSURE: 129 MMHG | HEIGHT: 78 IN | RESPIRATION RATE: 22 BRPM | DIASTOLIC BLOOD PRESSURE: 81 MMHG | WEIGHT: 315 LBS | OXYGEN SATURATION: 100 %

## 2019-08-16 DIAGNOSIS — R60.9 EDEMA, UNSPECIFIED: ICD-10-CM

## 2019-08-16 DIAGNOSIS — Z29.9 ENCOUNTER FOR PROPHYLACTIC MEASURES, UNSPECIFIED: ICD-10-CM

## 2019-08-16 DIAGNOSIS — I10 ESSENTIAL (PRIMARY) HYPERTENSION: ICD-10-CM

## 2019-08-16 DIAGNOSIS — E11.9 TYPE 2 DIABETES MELLITUS WITHOUT COMPLICATIONS: ICD-10-CM

## 2019-08-16 DIAGNOSIS — R06.02 SHORTNESS OF BREATH: ICD-10-CM

## 2019-08-16 DIAGNOSIS — F31.9 BIPOLAR DISORDER, UNSPECIFIED: ICD-10-CM

## 2019-08-16 DIAGNOSIS — Z98.890 OTHER SPECIFIED POSTPROCEDURAL STATES: Chronic | ICD-10-CM

## 2019-08-16 DIAGNOSIS — N18.6 END STAGE RENAL DISEASE: ICD-10-CM

## 2019-08-16 LAB
ALBUMIN SERPL ELPH-MCNC: 3.2 G/DL — LOW (ref 3.5–5)
ALP SERPL-CCNC: 174 U/L — HIGH (ref 40–120)
ALT FLD-CCNC: 30 U/L DA — SIGNIFICANT CHANGE UP (ref 10–60)
ANION GAP SERPL CALC-SCNC: 3 MMOL/L — LOW (ref 5–17)
APTT BLD: 33.7 SEC — SIGNIFICANT CHANGE UP (ref 27.5–36.3)
AST SERPL-CCNC: 19 U/L — SIGNIFICANT CHANGE UP (ref 10–40)
BASOPHILS # BLD AUTO: 0.03 K/UL — SIGNIFICANT CHANGE UP (ref 0–0.2)
BASOPHILS NFR BLD AUTO: 0.4 % — SIGNIFICANT CHANGE UP (ref 0–2)
BILIRUB SERPL-MCNC: 0.5 MG/DL — SIGNIFICANT CHANGE UP (ref 0.2–1.2)
BUN SERPL-MCNC: 24 MG/DL — HIGH (ref 7–18)
CALCIUM SERPL-MCNC: 8.6 MG/DL — SIGNIFICANT CHANGE UP (ref 8.4–10.5)
CHLORIDE SERPL-SCNC: 95 MMOL/L — LOW (ref 96–108)
CO2 SERPL-SCNC: 39 MMOL/L — HIGH (ref 22–31)
CREAT SERPL-MCNC: 5.41 MG/DL — HIGH (ref 0.5–1.3)
EOSINOPHIL # BLD AUTO: 0.26 K/UL — SIGNIFICANT CHANGE UP (ref 0–0.5)
EOSINOPHIL NFR BLD AUTO: 3.9 % — SIGNIFICANT CHANGE UP (ref 0–6)
GLUCOSE SERPL-MCNC: 138 MG/DL — HIGH (ref 70–99)
HCT VFR BLD CALC: 32.3 % — LOW (ref 39–50)
HGB BLD-MCNC: 9.4 G/DL — LOW (ref 13–17)
IMM GRANULOCYTES NFR BLD AUTO: 0.3 % — SIGNIFICANT CHANGE UP (ref 0–1.5)
INR BLD: 1.11 RATIO — SIGNIFICANT CHANGE UP (ref 0.88–1.16)
LYMPHOCYTES # BLD AUTO: 0.74 K/UL — LOW (ref 1–3.3)
LYMPHOCYTES # BLD AUTO: 11.1 % — LOW (ref 13–44)
MCHC RBC-ENTMCNC: 27.2 PG — SIGNIFICANT CHANGE UP (ref 27–34)
MCHC RBC-ENTMCNC: 29.1 GM/DL — LOW (ref 32–36)
MCV RBC AUTO: 93.6 FL — SIGNIFICANT CHANGE UP (ref 80–100)
MONOCYTES # BLD AUTO: 0.91 K/UL — HIGH (ref 0–0.9)
MONOCYTES NFR BLD AUTO: 13.6 % — SIGNIFICANT CHANGE UP (ref 2–14)
NEUTROPHILS # BLD AUTO: 4.72 K/UL — SIGNIFICANT CHANGE UP (ref 1.8–7.4)
NEUTROPHILS NFR BLD AUTO: 70.7 % — SIGNIFICANT CHANGE UP (ref 43–77)
NRBC # BLD: 0 /100 WBCS — SIGNIFICANT CHANGE UP (ref 0–0)
NT-PROBNP SERPL-SCNC: HIGH PG/ML (ref 0–125)
PLATELET # BLD AUTO: 265 K/UL — SIGNIFICANT CHANGE UP (ref 150–400)
POTASSIUM SERPL-MCNC: 4.2 MMOL/L — SIGNIFICANT CHANGE UP (ref 3.5–5.3)
POTASSIUM SERPL-SCNC: 4.2 MMOL/L — SIGNIFICANT CHANGE UP (ref 3.5–5.3)
PROT SERPL-MCNC: 7.9 G/DL — SIGNIFICANT CHANGE UP (ref 6–8.3)
PROTHROM AB SERPL-ACNC: 12.4 SEC — SIGNIFICANT CHANGE UP (ref 10–12.9)
RBC # BLD: 3.45 M/UL — LOW (ref 4.2–5.8)
RBC # FLD: 17.2 % — HIGH (ref 10.3–14.5)
SODIUM SERPL-SCNC: 137 MMOL/L — SIGNIFICANT CHANGE UP (ref 135–145)
WBC # BLD: 6.68 K/UL — SIGNIFICANT CHANGE UP (ref 3.8–10.5)
WBC # FLD AUTO: 6.68 K/UL — SIGNIFICANT CHANGE UP (ref 3.8–10.5)

## 2019-08-16 PROCEDURE — 99285 EMERGENCY DEPT VISIT HI MDM: CPT

## 2019-08-16 PROCEDURE — 99223 1ST HOSP IP/OBS HIGH 75: CPT

## 2019-08-16 PROCEDURE — 71045 X-RAY EXAM CHEST 1 VIEW: CPT | Mod: 26

## 2019-08-16 RX ORDER — RISPERIDONE 4 MG/1
1 TABLET ORAL AT BEDTIME
Refills: 0 | Status: DISCONTINUED | OUTPATIENT
Start: 2019-08-16 | End: 2019-08-20

## 2019-08-16 RX ORDER — SIMVASTATIN 20 MG/1
40 TABLET, FILM COATED ORAL AT BEDTIME
Refills: 0 | Status: DISCONTINUED | OUTPATIENT
Start: 2019-08-16 | End: 2019-08-20

## 2019-08-16 RX ORDER — METOPROLOL TARTRATE 50 MG
25 TABLET ORAL
Refills: 0 | Status: DISCONTINUED | OUTPATIENT
Start: 2019-08-16 | End: 2019-08-20

## 2019-08-16 RX ORDER — ACETAMINOPHEN 500 MG
650 TABLET ORAL ONCE
Refills: 0 | Status: COMPLETED | OUTPATIENT
Start: 2019-08-16 | End: 2019-08-16

## 2019-08-16 RX ORDER — INSULIN LISPRO 100/ML
5 VIAL (ML) SUBCUTANEOUS
Refills: 0 | Status: DISCONTINUED | OUTPATIENT
Start: 2019-08-16 | End: 2019-08-20

## 2019-08-16 RX ORDER — FUROSEMIDE 40 MG
80 TABLET ORAL DAILY
Refills: 0 | Status: DISCONTINUED | OUTPATIENT
Start: 2019-08-16 | End: 2019-08-17

## 2019-08-16 RX ORDER — MIRTAZAPINE 45 MG/1
7.5 TABLET, ORALLY DISINTEGRATING ORAL AT BEDTIME
Refills: 0 | Status: DISCONTINUED | OUTPATIENT
Start: 2019-08-16 | End: 2019-08-20

## 2019-08-16 RX ORDER — GABAPENTIN 400 MG/1
300 CAPSULE ORAL AT BEDTIME
Refills: 0 | Status: DISCONTINUED | OUTPATIENT
Start: 2019-08-16 | End: 2019-08-20

## 2019-08-16 RX ORDER — HEPARIN SODIUM 5000 [USP'U]/ML
5000 INJECTION INTRAVENOUS; SUBCUTANEOUS EVERY 12 HOURS
Refills: 0 | Status: DISCONTINUED | OUTPATIENT
Start: 2019-08-16 | End: 2019-08-20

## 2019-08-16 RX ORDER — INSULIN LISPRO 100/ML
VIAL (ML) SUBCUTANEOUS
Refills: 0 | Status: DISCONTINUED | OUTPATIENT
Start: 2019-08-16 | End: 2019-08-20

## 2019-08-16 RX ORDER — HYDRALAZINE HCL 50 MG
50 TABLET ORAL THREE TIMES A DAY
Refills: 0 | Status: DISCONTINUED | OUTPATIENT
Start: 2019-08-16 | End: 2019-08-20

## 2019-08-16 RX ORDER — AMLODIPINE BESYLATE 2.5 MG/1
5 TABLET ORAL DAILY
Refills: 0 | Status: DISCONTINUED | OUTPATIENT
Start: 2019-08-16 | End: 2019-08-20

## 2019-08-16 RX ORDER — ACETAMINOPHEN 500 MG
1000 TABLET ORAL ONCE
Refills: 0 | Status: COMPLETED | OUTPATIENT
Start: 2019-08-16 | End: 2019-08-16

## 2019-08-16 RX ORDER — TRAMADOL HYDROCHLORIDE 50 MG/1
50 TABLET ORAL EVERY 6 HOURS
Refills: 0 | Status: DISCONTINUED | OUTPATIENT
Start: 2019-08-16 | End: 2019-08-20

## 2019-08-16 RX ORDER — CLOPIDOGREL BISULFATE 75 MG/1
75 TABLET, FILM COATED ORAL DAILY
Refills: 0 | Status: DISCONTINUED | OUTPATIENT
Start: 2019-08-16 | End: 2019-08-20

## 2019-08-16 RX ADMIN — Medication 80 MILLIGRAM(S): at 23:14

## 2019-08-16 NOTE — H&P ADULT - NSHPSOCIALHISTORY_GEN_ALL_CORE
Denies use of tobacco, alcohol or illicit drugs. Lives at home with parents. Ambulates independently, but occasionally uses a cane.

## 2019-08-16 NOTE — ED ADULT TRIAGE NOTE - CHIEF COMPLAINT QUOTE
Increased lower extremity swelling, ESRD left arm graft dialysis MWF.  Had dialysis today.  Pain to both legs

## 2019-08-16 NOTE — H&P ADULT - HISTORY OF PRESENT ILLNESS
38 y/o male with PMHx of ESRD on MWF HD (via LUE AVF), DM (on insulin), on home O2 (4L NC) and HTN presents to the ED with chief complaint of shortness of breath x2 days and worsening LE edema. He reports being dialyzed for a full 5 hour session today and insists he is compliant with all medications. He complains of pain in both legs bilaterally. He denies chest pain, dizziness, nausea, vomiting or any other symptoms at this time.    ED Course: Patient is breathing comfortably on 5L NC. 38 y/o male with PMHx of ESRD on MWF HD (via LUE AVF), DM (on insulin), cocaine abuse, on home O2 (4L NC), morbid obesity, Bipolar disorder, schizophrenia and HTN presents to the ED with chief complaint of shortness of breath x2 days and worsening LE edema. He reports being dialyzed for a full 5 hour session today and insists he is compliant with all medications. He complains of pain in both legs bilaterally over this time period as well. He denies chest pain, dizziness, nausea, vomiting or any other symptoms at this time.    Outpatient nephrologist: Dr. La  Outpatient dialysis center: [facility] [place]  Last hemodialysis session: 8/16/19 for 5 hours  Dialysis access: LUE AVF

## 2019-08-16 NOTE — H&P ADULT - PROBLEM SELECTOR PLAN 3
pt takes novolog 15 units tid premeal  starting humalog 5 units tid with hss  monitor fs  titrate insulin up as needed  f/u HbA1C

## 2019-08-16 NOTE — H&P ADULT - PROBLEM SELECTOR PLAN 1
CXR shows b/l congestion, however not drastically different from baseline  proBNP 68612 on admission  starting lasix 80mg IV push qd - patient is able to make urine  saturating well on 5L NC  plan for HD tomorrow  1L fluid restriction CXR shows b/l congestion, however not drastically different from baseline  proBNP 65216 on admission  starting lasix 80mg IV push qd - patient is able to make urine  saturating well on 5L NC  plan for HD tomorrow  1L fluid restriction  daily weights/strict I/Os

## 2019-08-16 NOTE — H&P ADULT - PROBLEM SELECTOR PLAN 6
DC instructions IMPROVE VTE Individual Risk Assessment          RISK                                                          Points  [  ] Previous VTE                                                3  [  ] Thrombophilia                                             2  [  ] Lower limb paralysis                                   2        (unable to hold up >15 seconds)    [  ] Current Cancer                                             2         (within 6 months)  [ X ] Immobilization > 24 hrs                              1  [  ] ICU/CCU stay > 24 hours                             1  [  ] Age > 60                                                         1    IMPROVE VTE Score: 1    Heparin subq for dvt ppx

## 2019-08-16 NOTE — ED PROVIDER NOTE - CLINICAL SUMMARY MEDICAL DECISION MAKING FREE TEXT BOX
patient presenting with sob, ekg normal sinus. will obtain lab, cxr, f/o chf, fluid overload, ed obs and reassess

## 2019-08-16 NOTE — ED ADULT NURSE NOTE - NSIMPLEMENTINTERV_GEN_ALL_ED
Implemented All Fall with Harm Risk Interventions:  [place] to call system. Call bell, personal items and telephone within reach. Instruct patient to call for assistance. Room bathroom lighting operational. Non-slip footwear when patient is off stretcher. Physically safe environment: no spills, clutter or unnecessary equipment. Stretcher in lowest position, wheels locked, appropriate side rails in place. Provide visual cue, wrist band, yellow gown, etc. Monitor gait and stability. Monitor for mental status changes and reorient to person, place, and time. Review medications for side effects contributing to fall risk. Reinforce activity limits and safety measures with patient and family. Provide visual clues: red socks.

## 2019-08-16 NOTE — H&P ADULT - ATTENDING COMMENTS
39 year old patient of Dr Andino with PMH significant for morbid obesity, ESRD on HD with Dr La ( last HD earlier today) DM2 with last A1c 5.6 ( 07/2019), HTN, chronic resp. failure on Home O2, hx of substance abuse presenting to the ED with  shortness of breath and worsening bilateral LE edema for the past 2 days.  There was no CP, no cough, no fevers or other symptoms except progressive worsening B/L LE swelling and abdominal wall swelling.  He is off Lasix / diuretics but states he has been compliant with his HD sessions.    Vital Signs Last 24 Hrs  T(C): 36.7 (16 Aug 2019 19:26), Max: 36.8 (16 Aug 2019 16:13)  T(F): 98 (16 Aug 2019 19:26), Max: 98.2 (16 Aug 2019 16:13)  HR: 90 (16 Aug 2019 19:26) (86 - 90)  BP: 153/98 (16 Aug 2019 19:26) (129/81 - 153/98)  RR: 20 (16 Aug 2019 19:26) (20 - 22)  SpO2: 98% (16 Aug 2019 19:26) (98% - 100%)    Young man, obese++, NAD but complaining of pains in both LEs. AAO X 3   Mild pallor anicteric, no JVD  CTA B/L RRR S1S2 only  Full, obese, pitting wall edema over the infraumbilical region; otherwise NTND BS+  ++++pitting edema over both LE, no calf tenderness  No focal deficits    Labs                        9.4    6.68  )-----------( 265      ( 16 Aug 2019 17:32 )             32.3     08-16    137  |  95<L>  |  24<H>  ----------------------------<  138<H>  4.2   |  39<H>  |  5.41<H>    Ca    8.6      16 Aug 2019 17:32    TPro  7.9  /  Alb  3.2<L>  /  TBili  0.5  /  DBili  x   /  AST  19  /  ALT  30  /  AlkPhos  174<H>  08-16    pro BNP - 73259  Trop - pending  UTOX - pending    CXR - appears to show widespread pulm vasc. congestion that looks unchanged from baseline.    ECHO - 03/2019  CONCLUSIONS:  1. Normal mitral valve.  2. Normal trileaflet aortic valve.  3. Aortic Root: 3.7 cm.  4. Normal left atrium.  LA volume index = 28 cc/m2.  5. Moderate concentric left ventricular hypertrophy.  6. Normal Left Ventricular Systolic Function,  (EF = 55 to  60%)  7. Normal diastolic function.  8. Normal right atrium.  9. Normal right ventricular size and function.  10. Unable to estimate RVSP.  11. There is mild tricuspid regurgitation.  12. Normal pulmonic valve.  13. Normal pericardium with no pericardial effusion.    Impression  39 year old man with hx as detailed above presenting with 2 days of persistent SOB and LE edema despite HD suggestive of a fluid overload. There is no evidence of CHF from recent ECHO and this would suggest a primary renal etiology.    A/P  Fluid overload  ESRD on HD  Chronic respiratory failure likely from fluid overload, morbid obesity +/- undiagnosed KRYSTIN on home O2  Metabolic alkalosis which appears chronic likely compensatory response to underlying respiratory acidosis    Plan  Commence IV lasix 40mg Q 12 hrly  Monitor weight and I/O daily  Renal diet  Continue HD session ; Consult Dr La  Obtain baseline trop and urine tox assay  Pain control

## 2019-08-16 NOTE — H&P ADULT - NSHPPHYSICALEXAM_GEN_ALL_CORE
Vital Signs Last 24 Hrs  T(C): 36.7 (16 Aug 2019 19:26), Max: 36.8 (16 Aug 2019 16:13)  T(F): 98 (16 Aug 2019 19:26), Max: 98.2 (16 Aug 2019 16:13)  HR: 90 (16 Aug 2019 19:26) (86 - 90)  BP: 153/98 (16 Aug 2019 19:26) (129/81 - 153/98)  BP(mean): --  RR: 20 (16 Aug 2019 19:26) (20 - 22)  SpO2: 98% (16 Aug 2019 19:26) (98% - 100%)

## 2019-08-16 NOTE — ED PROVIDER NOTE - OBJECTIVE STATEMENT
39 y.o w/ pmh of esrd, completed 5 hr of dialysis presenting for sob and 30 kilo weight gain and b/l leg swelling. endorses that even after completing his session he still felt short of breath. denies cp, n, v, abd pain, fever, chills, calf pain/redness.

## 2019-08-16 NOTE — H&P ADULT - ASSESSMENT
40 y/o M with ESRD presents to the ED with shortness of breath x 2 days, likely from fluid overload in setting of ESRD. s/p urgent HD session on admission. Will dialyze again tomorrow as per nephro.

## 2019-08-17 LAB
ALBUMIN SERPL ELPH-MCNC: 3 G/DL — LOW (ref 3.5–5)
ALP SERPL-CCNC: 156 U/L — HIGH (ref 40–120)
ALT FLD-CCNC: 25 U/L DA — SIGNIFICANT CHANGE UP (ref 10–60)
ANION GAP SERPL CALC-SCNC: 6 MMOL/L — SIGNIFICANT CHANGE UP (ref 5–17)
AST SERPL-CCNC: 16 U/L — SIGNIFICANT CHANGE UP (ref 10–40)
BASOPHILS # BLD AUTO: 0.02 K/UL — SIGNIFICANT CHANGE UP (ref 0–0.2)
BASOPHILS NFR BLD AUTO: 0.3 % — SIGNIFICANT CHANGE UP (ref 0–2)
BILIRUB SERPL-MCNC: 0.6 MG/DL — SIGNIFICANT CHANGE UP (ref 0.2–1.2)
BUN SERPL-MCNC: 37 MG/DL — HIGH (ref 7–18)
CALCIUM SERPL-MCNC: 8.8 MG/DL — SIGNIFICANT CHANGE UP (ref 8.4–10.5)
CHLORIDE SERPL-SCNC: 96 MMOL/L — SIGNIFICANT CHANGE UP (ref 96–108)
CHOLEST SERPL-MCNC: 143 MG/DL — SIGNIFICANT CHANGE UP (ref 10–199)
CK MB CFR SERPL CALC: 1.2 NG/ML — SIGNIFICANT CHANGE UP (ref 0–3.6)
CK SERPL-CCNC: 121 U/L — SIGNIFICANT CHANGE UP (ref 35–232)
CO2 SERPL-SCNC: 34 MMOL/L — HIGH (ref 22–31)
CREAT SERPL-MCNC: 6.95 MG/DL — HIGH (ref 0.5–1.3)
EOSINOPHIL # BLD AUTO: 0.3 K/UL — SIGNIFICANT CHANGE UP (ref 0–0.5)
EOSINOPHIL NFR BLD AUTO: 4.5 % — SIGNIFICANT CHANGE UP (ref 0–6)
FOLATE SERPL-MCNC: 19.1 NG/ML — SIGNIFICANT CHANGE UP
GLUCOSE BLDC GLUCOMTR-MCNC: 110 MG/DL — HIGH (ref 70–99)
GLUCOSE BLDC GLUCOMTR-MCNC: 118 MG/DL — HIGH (ref 70–99)
GLUCOSE BLDC GLUCOMTR-MCNC: 126 MG/DL — HIGH (ref 70–99)
GLUCOSE BLDC GLUCOMTR-MCNC: 180 MG/DL — HIGH (ref 70–99)
GLUCOSE BLDC GLUCOMTR-MCNC: 91 MG/DL — SIGNIFICANT CHANGE UP (ref 70–99)
GLUCOSE SERPL-MCNC: 146 MG/DL — HIGH (ref 70–99)
HCT VFR BLD CALC: 30.7 % — LOW (ref 39–50)
HDLC SERPL-MCNC: 51 MG/DL — SIGNIFICANT CHANGE UP
HGB BLD-MCNC: 8.9 G/DL — LOW (ref 13–17)
IMM GRANULOCYTES NFR BLD AUTO: 0.3 % — SIGNIFICANT CHANGE UP (ref 0–1.5)
LIPID PNL WITH DIRECT LDL SERPL: 66 MG/DL — SIGNIFICANT CHANGE UP
LYMPHOCYTES # BLD AUTO: 1 K/UL — SIGNIFICANT CHANGE UP (ref 1–3.3)
LYMPHOCYTES # BLD AUTO: 14.9 % — SIGNIFICANT CHANGE UP (ref 13–44)
MAGNESIUM SERPL-MCNC: 2.3 MG/DL — SIGNIFICANT CHANGE UP (ref 1.6–2.6)
MCHC RBC-ENTMCNC: 27 PG — SIGNIFICANT CHANGE UP (ref 27–34)
MCHC RBC-ENTMCNC: 29 GM/DL — LOW (ref 32–36)
MCV RBC AUTO: 93 FL — SIGNIFICANT CHANGE UP (ref 80–100)
MONOCYTES # BLD AUTO: 1.08 K/UL — HIGH (ref 0–0.9)
MONOCYTES NFR BLD AUTO: 16 % — HIGH (ref 2–14)
NEUTROPHILS # BLD AUTO: 4.31 K/UL — SIGNIFICANT CHANGE UP (ref 1.8–7.4)
NEUTROPHILS NFR BLD AUTO: 64 % — SIGNIFICANT CHANGE UP (ref 43–77)
NRBC # BLD: 0 /100 WBCS — SIGNIFICANT CHANGE UP (ref 0–0)
PCP SPEC-MCNC: SIGNIFICANT CHANGE UP
PHOSPHATE SERPL-MCNC: 6.3 MG/DL — HIGH (ref 2.5–4.5)
PLATELET # BLD AUTO: 260 K/UL — SIGNIFICANT CHANGE UP (ref 150–400)
POTASSIUM SERPL-MCNC: 4.3 MMOL/L — SIGNIFICANT CHANGE UP (ref 3.5–5.3)
POTASSIUM SERPL-SCNC: 4.3 MMOL/L — SIGNIFICANT CHANGE UP (ref 3.5–5.3)
PROT SERPL-MCNC: 7.1 G/DL — SIGNIFICANT CHANGE UP (ref 6–8.3)
RBC # BLD: 3.3 M/UL — LOW (ref 4.2–5.8)
RBC # FLD: 17.3 % — HIGH (ref 10.3–14.5)
SODIUM SERPL-SCNC: 136 MMOL/L — SIGNIFICANT CHANGE UP (ref 135–145)
TOTAL CHOLESTEROL/HDL RATIO MEASUREMENT: 2.8 RATIO — LOW (ref 3.4–9.6)
TRIGL SERPL-MCNC: 130 MG/DL — SIGNIFICANT CHANGE UP (ref 10–149)
TROPONIN I SERPL-MCNC: <0.015 NG/ML — SIGNIFICANT CHANGE UP (ref 0–0.04)
TSH SERPL-MCNC: 7.11 UU/ML — HIGH (ref 0.34–4.82)
VIT B12 SERPL-MCNC: 907 PG/ML — SIGNIFICANT CHANGE UP (ref 232–1245)
WBC # BLD: 6.73 K/UL — SIGNIFICANT CHANGE UP (ref 3.8–10.5)
WBC # FLD AUTO: 6.73 K/UL — SIGNIFICANT CHANGE UP (ref 3.8–10.5)

## 2019-08-17 RX ORDER — PANTOPRAZOLE SODIUM 20 MG/1
40 TABLET, DELAYED RELEASE ORAL DAILY
Refills: 0 | Status: DISCONTINUED | OUTPATIENT
Start: 2019-08-17 | End: 2019-08-20

## 2019-08-17 RX ORDER — RISPERIDONE 4 MG/1
1 TABLET ORAL ONCE
Refills: 0 | Status: COMPLETED | OUTPATIENT
Start: 2019-08-17 | End: 2019-08-17

## 2019-08-17 RX ORDER — ACETAMINOPHEN 500 MG
1000 TABLET ORAL ONCE
Refills: 0 | Status: COMPLETED | OUTPATIENT
Start: 2019-08-17 | End: 2019-08-17

## 2019-08-17 RX ORDER — SIMVASTATIN 20 MG/1
40 TABLET, FILM COATED ORAL AT BEDTIME
Refills: 0 | Status: DISCONTINUED | OUTPATIENT
Start: 2019-08-17 | End: 2019-08-20

## 2019-08-17 RX ORDER — MORPHINE SULFATE 50 MG/1
2 CAPSULE, EXTENDED RELEASE ORAL ONCE
Refills: 0 | Status: DISCONTINUED | OUTPATIENT
Start: 2019-08-17 | End: 2019-08-17

## 2019-08-17 RX ORDER — FUROSEMIDE 40 MG
40 TABLET ORAL EVERY 12 HOURS
Refills: 0 | Status: DISCONTINUED | OUTPATIENT
Start: 2019-08-17 | End: 2019-08-20

## 2019-08-17 RX ORDER — ONDANSETRON 8 MG/1
4 TABLET, FILM COATED ORAL EVERY 6 HOURS
Refills: 0 | Status: DISCONTINUED | OUTPATIENT
Start: 2019-08-17 | End: 2019-08-20

## 2019-08-17 RX ORDER — MIRTAZAPINE 45 MG/1
7.5 TABLET, ORALLY DISINTEGRATING ORAL ONCE
Refills: 0 | Status: COMPLETED | OUTPATIENT
Start: 2019-08-17 | End: 2019-08-17

## 2019-08-17 RX ORDER — HYDRALAZINE HCL 50 MG
50 TABLET ORAL ONCE
Refills: 0 | Status: COMPLETED | OUTPATIENT
Start: 2019-08-17 | End: 2019-08-17

## 2019-08-17 RX ADMIN — TRAMADOL HYDROCHLORIDE 50 MILLIGRAM(S): 50 TABLET ORAL at 08:35

## 2019-08-17 RX ADMIN — SIMVASTATIN 40 MILLIGRAM(S): 20 TABLET, FILM COATED ORAL at 21:37

## 2019-08-17 RX ADMIN — Medication 40 MILLIGRAM(S): at 20:06

## 2019-08-17 RX ADMIN — TRAMADOL HYDROCHLORIDE 50 MILLIGRAM(S): 50 TABLET ORAL at 20:06

## 2019-08-17 RX ADMIN — Medication 50 MILLIGRAM(S): at 13:19

## 2019-08-17 RX ADMIN — MIRTAZAPINE 7.5 MILLIGRAM(S): 45 TABLET, ORALLY DISINTEGRATING ORAL at 21:37

## 2019-08-17 RX ADMIN — Medication 50 MILLIGRAM(S): at 21:37

## 2019-08-17 RX ADMIN — Medication 50 MILLIGRAM(S): at 05:36

## 2019-08-17 RX ADMIN — ONDANSETRON 4 MILLIGRAM(S): 8 TABLET, FILM COATED ORAL at 01:24

## 2019-08-17 RX ADMIN — Medication 5 UNIT(S): at 08:02

## 2019-08-17 RX ADMIN — Medication 5 UNIT(S): at 13:16

## 2019-08-17 RX ADMIN — GABAPENTIN 300 MILLIGRAM(S): 400 CAPSULE ORAL at 21:37

## 2019-08-17 RX ADMIN — RISPERIDONE 1 MILLIGRAM(S): 4 TABLET ORAL at 21:37

## 2019-08-17 RX ADMIN — PANTOPRAZOLE SODIUM 40 MILLIGRAM(S): 20 TABLET, DELAYED RELEASE ORAL at 13:18

## 2019-08-17 RX ADMIN — RISPERIDONE 1 MILLIGRAM(S): 4 TABLET ORAL at 02:27

## 2019-08-17 RX ADMIN — TRAMADOL HYDROCHLORIDE 50 MILLIGRAM(S): 50 TABLET ORAL at 21:04

## 2019-08-17 RX ADMIN — MIRTAZAPINE 7.5 MILLIGRAM(S): 45 TABLET, ORALLY DISINTEGRATING ORAL at 02:27

## 2019-08-17 RX ADMIN — MORPHINE SULFATE 2 MILLIGRAM(S): 50 CAPSULE, EXTENDED RELEASE ORAL at 01:24

## 2019-08-17 RX ADMIN — Medication 50 MILLIGRAM(S): at 02:27

## 2019-08-17 RX ADMIN — MORPHINE SULFATE 2 MILLIGRAM(S): 50 CAPSULE, EXTENDED RELEASE ORAL at 02:00

## 2019-08-17 RX ADMIN — TRAMADOL HYDROCHLORIDE 50 MILLIGRAM(S): 50 TABLET ORAL at 08:02

## 2019-08-17 RX ADMIN — Medication 200 MILLIGRAM(S): at 22:55

## 2019-08-17 RX ADMIN — Medication 1000 MILLIGRAM(S): at 23:25

## 2019-08-17 RX ADMIN — HEPARIN SODIUM 5000 UNIT(S): 5000 INJECTION INTRAVENOUS; SUBCUTANEOUS at 05:35

## 2019-08-17 RX ADMIN — AMLODIPINE BESYLATE 5 MILLIGRAM(S): 2.5 TABLET ORAL at 05:36

## 2019-08-17 RX ADMIN — Medication 25 MILLIGRAM(S): at 05:36

## 2019-08-17 NOTE — CONSULT NOTE ADULT - SUBJECTIVE AND OBJECTIVE BOX
Patient is a 39y Male whom presented to the hospital with   LEG  PAIN   HAS   MEDICAL  HX  AS  BELOW    WA S DIALYZED  YESTERDAY,  GOT   5  HRS  HD  WITH   GOOD  FLUID  REMOVAL   CAME  IN  WITH  C/O  BILAT  LEG  PAINS,  HAS  BEEN GETTING  IT  PERIODICALLY  AND  HAS  HAD   MULTIPLE  ADMISSIONS IN THE  PAST  FOR  SIMILAR  COMPLAINTS   OR  COMES  IN  WITH  SOB,  THIS  TIME  HAD  NO  COMPLAINTS OF  SOB     FEELS  NAUSEOUS  AT TIMES  AND  ALSO  HAS   COMPLAINTS  OF  RETROSTERNAL  BURNING  SENSATION-  HAS  H/O  GERD  WAS  SLEEPING -   EASILY  AROUSABLE,  DOESNT  LOOK  SOB    PAST MEDICAL & SURGICAL HISTORY:  Migraine  HTN (hypertension)  DM (diabetes mellitus)  Bipolar disorder  Schizophrenia  ESRD on dialysis  Morbid obesity with BMI of 40.0-44.9, adult  H/O hernia repair    aspirin (Short breath)  aspirin (Swelling)  penicillin (Short breath)  penicillins (Swelling)  shellfish (Unknown)    Home Medications Reviewed  Hospital Medications:   MEDICATIONS  (STANDING):  amLODIPine   Tablet 5 milliGRAM(s) Oral daily  clopidogrel Tablet 75 milliGRAM(s) Oral daily  furosemide   Injectable 40 milliGRAM(s) IV Push every 12 hours  gabapentin 300 milliGRAM(s) Oral at bedtime  heparin  Injectable 5000 Unit(s) SubCutaneous every 12 hours  hydrALAZINE 50 milliGRAM(s) Oral three times a day  insulin lispro (HumaLOG) corrective regimen sliding scale   SubCutaneous three times a day before meals  insulin lispro Injectable (HumaLOG) 5 Unit(s) SubCutaneous three times a day before meals  metoprolol tartrate 25 milliGRAM(s) Oral two times a day  mirtazapine 7.5 milliGRAM(s) Oral at bedtime  pantoprazole  Injectable 40 milliGRAM(s) IV Push daily  PARoxetine 10 milliGRAM(s) Oral daily  risperiDONE   Tablet 1 milliGRAM(s) Oral at bedtime  simvastatin 40 milliGRAM(s) Oral at bedtime  simvastatin 40 milliGRAM(s) Oral at bedtime    SOCIAL HISTORY:  Denies ETOh,Smoking,   FAMILY HISTORY:  Family history of COPD (chronic obstructive pulmonary disease)  Family history of diabetes mellitus    REVIEW OF SYSTEMS:  DOESNT  APPEAR  SOB,  HAS O2 ON  VIA  NASAL  CANNULA   NO  FEVER/CHILLS     NO  COUGH OR  CH  PAIN   NO  N/V,  APPETITE IS  GOOD,  WAS  NAUSEOUS  LAST NIGHT   HAS  LEG  SWELLING   STILL MAKES  URINE  HAS  PAIN IN  HIS  LEGS    VITALS:  T(F): 99 (08-17-19 @ 05:09), Max: 99.9 (08-17-19 @ 00:13)  HR: 100 (08-17-19 @ 05:09)  BP: 125/69 (08-17-19 @ 05:09)  RR: 16 (08-17-19 @ 05:09)  SpO2: 100% (08-17-19 @ 05:09)  Wt(kg): --    Height (cm): 200.66 (08-16 @ 16:13)  Weight (kg): 167.8 (08-16 @ 16:13)  BMI (kg/m2): 41.7 (08-16 @ 16:13)  BSA (m2): 2.96 (08-16 @ 16:13)  PHYSICAL EXAM:  Constitutional: NAD  HEENT: anicteric sclera, oropharynx clear, MMM  Neck: No JVD  Respiratory: CTAB, no wheezes, rales or rhonchi  Cardiovascular: S1, S2, RRR  Gastrointestinal: BS+, soft, NT/ND, OBESE  Extremities:  peripheral edema 2 +  NO  TENDERNESS NOTED    Neurological: A/O x 3,   :No hernandez.     Vascular Access: AVF  L  UPPER  ARM ,  WORKING  WELL    LABS:  08-16    137  |  95<L>  |  24<H>  ----------------------------<  138<H>  4.2   |  39<H>  |  5.41<H>    Ca    8.6      16 Aug 2019 17:32    TPro  7.9  /  Alb  3.2<L>  /  TBili  0.5  /  DBili      /  AST  19  /  ALT  30  /  AlkPhos  174<H>  08-16    Creatinine Trend: 5.41 <--                        9.4    6.68  )-----------( 265      ( 16 Aug 2019 17:32 )             32.3     Urine Studies:      RADIOLOGY & ADDITIONAL STUDIES:

## 2019-08-17 NOTE — PROGRESS NOTE ADULT - SUBJECTIVE AND OBJECTIVE BOX
HPI:  40 y/o male with PMHx of ESRD on MWF HD (via LUE AVF), DM (on insulin), cocaine abuse, on home O2 (4L NC), morbid obesity, Bipolar disorder, schizophrenia and HTN presents to the ED with chief complaint of shortness of breath x2 days and worsening LE edema. He reports being dialyzed for a full 5 hour session today and insists he is compliant with all medications. He complains of pain in both legs bilaterally over this time period as well. He denies chest pain, dizziness, nausea, vomiting or any other symptoms at this time.    Outpatient nephrologist: Dr. La  Outpatient dialysis center: [facility]  Last hemodialysis session: 8/16/19 for 5 hours  Dialysis access: LUE AVF (16 Aug 2019 21:17)      Patient is a 39y old  Male who presents with a chief complaint of sob (17 Aug 2019 12:46)      INTERVAL HPI/OVERNIGHT EVENTS:  T(C): 36.3 (08-17-19 @ 14:18), Max: 37.7 (08-17-19 @ 00:13)  HR: 84 (08-17-19 @ 14:18) (84 - 100)  BP: 136/82 (08-17-19 @ 14:18) (125/69 - 153/98)  RR: 17 (08-17-19 @ 14:18) (16 - 22)  SpO2: 100% (08-17-19 @ 14:18) (98% - 100%)  Wt(kg): --  I&O's Summary      REVIEW OF SYSTEMS: denies fever, chills, SOB, palpitations, chest pain, abdominal pain, nausea, vomitting, diarrhea, constipation, dizziness    MEDICATIONS  (STANDING):  amLODIPine   Tablet 5 milliGRAM(s) Oral daily  clopidogrel Tablet 75 milliGRAM(s) Oral daily  furosemide   Injectable 40 milliGRAM(s) IV Push every 12 hours  gabapentin 300 milliGRAM(s) Oral at bedtime  heparin  Injectable 5000 Unit(s) SubCutaneous every 12 hours  hydrALAZINE 50 milliGRAM(s) Oral three times a day  insulin lispro (HumaLOG) corrective regimen sliding scale   SubCutaneous three times a day before meals  insulin lispro Injectable (HumaLOG) 5 Unit(s) SubCutaneous three times a day before meals  metoprolol tartrate 25 milliGRAM(s) Oral two times a day  mirtazapine 7.5 milliGRAM(s) Oral at bedtime  pantoprazole  Injectable 40 milliGRAM(s) IV Push daily  PARoxetine 10 milliGRAM(s) Oral daily  risperiDONE   Tablet 1 milliGRAM(s) Oral at bedtime  simvastatin 40 milliGRAM(s) Oral at bedtime  simvastatin 40 milliGRAM(s) Oral at bedtime    MEDICATIONS  (PRN):  ondansetron Injectable 4 milliGRAM(s) IV Push every 6 hours PRN Nausea  traMADol 50 milliGRAM(s) Oral every 6 hours PRN Severe Pain (7 - 10)      PHYSICAL EXAM:  GENERAL: NAD, well-groomed, well-developed  HEAD:  Atraumatic, Normocephalic  EYES: EOMI, PERRLA, conjunctiva and sclera clear  ENMT: No tonsillar erythema, exudates, or enlargement; Moist mucous membranes, Good dentition, No lesions  NECK: Supple, No JVD, Normal thyroid  NERVOUS SYSTEM:  Alert & Oriented X3, Good concentration; Motor Strength 5/5 B/L upper and lower extremities; DTRs 2+ intact and symmetric  CHEST/LUNG: Clear to percussion bilaterally; No rales, rhonchi, wheezing, or rubs  HEART: Regular rate and rhythm; No murmurs, rubs, or gallops  ABDOMEN: Soft, Nontender, Nondistended; Bowel sounds present  EXTREMITIES:  2+ Peripheral Pulses, No clubbing, cyanosis, or edema  LYMPH: No lymphadenopathy noted  SKIN: No rashes or lesions  LABS:                        8.9    6.73  )-----------( 260      ( 17 Aug 2019 15:02 )             30.7     08-17    136  |  96  |  37<H>  ----------------------------<  146<H>  4.3   |  34<H>  |  6.95<H>    Ca    8.8      17 Aug 2019 15:02  Phos  6.3     08-17  Mg     2.3     08-17    TPro  7.1  /  Alb  3.0<L>  /  TBili  0.6  /  DBili  x   /  AST  16  /  ALT  25  /  AlkPhos  156<H>  08-17    PT/INR - ( 16 Aug 2019 17:32 )   PT: 12.4 sec;   INR: 1.11 ratio         PTT - ( 16 Aug 2019 17:32 )  PTT:33.7 sec    CAPILLARY BLOOD GLUCOSE      POCT Blood Glucose.: 126 mg/dL (17 Aug 2019 11:43)  POCT Blood Glucose.: 110 mg/dL (17 Aug 2019 07:53)  POCT Blood Glucose.: 145 mg/dL (16 Aug 2019 16:20)

## 2019-08-17 NOTE — CONSULT NOTE ADULT - ASSESSMENT
A/P  ESRD ON  HD  ,  GO T  5  HRS  HD  YESTERDAY  WILL   DO 3-1/2  HRS  AGAIN TODAY  UF  GOAL  4  LIT,  CONSENT  IN  CHART  HAS  VOL EXCESS  GOT  EPOGEN  YESTERDAY  ADV T  LIMIT   FLUID INTAKE-  1 LIT  /DAY    PAIN  IN  HIS LEG,  NEUROPATHIC,   ON  MEDS  ELECTROLYTES OK     D/W  PT  AND  HIS  MOTHER  BY  HIS  SIDE

## 2019-08-17 NOTE — CHART NOTE - NSCHARTNOTEFT_GEN_A_CORE
EVENT: patient transferred to the floor from the ED. Patient seen in the room. Patient refused to take 10 pm PO meds due to nausea. Care plan discussed with patient and RN. Patient is agreeable to take PO meds with zofran. Also patient states he is getting "acid reflux" in the morning and this leads to the nausea.     OBJECTIVE:  Vital Signs Last 24 Hrs  T(C): 37 (17 Aug 2019 00:49), Max: 37.7 (17 Aug 2019 00:13)  T(F): 98.6 (17 Aug 2019 00:49), Max: 99.9 (17 Aug 2019 00:13)  HR: 87 (17 Aug 2019 00:49) (86 - 95)  BP: 135/76 (17 Aug 2019 00:49) (129/81 - 153/98)  BP(mean): --  RR: 16 (17 Aug 2019 00:49) (16 - 22)  SpO2: 100% (17 Aug 2019 00:49) (98% - 100%)    FOCUSED PHYSICAL EXAM:  Neuro: awake, alert, oriented x 3. No neuro deficit  Cardiovascular: Pulses +2 B/L in lower and upper extremities, HR regular, BP stable, +2 pitting edema in lower extremities.  Respiratory: Respirations regular, unlabored, breath sounds clear B/L.   GI: Abdomen soft, non-tender, positive bowel sounds.  : no bladder distention noted. No complaints at this time.      I&O's      LABS:                        9.4    6.68  )-----------( 265      ( 16 Aug 2019 17:32 )             32.3   CARDIAC MARKERS ( 17 Aug 2019 00:24 )  <0.015 ng/mL / x     / 121 U/L / x     / 1.2 ng/mL        137  |  95<L>  |  24<H>  ----------------------------<  138<H>  4.2   |  39<H>  |  5.41<H>    Ca    8.6      16 Aug 2019 17:32    TPro  7.9  /  Alb  3.2<L>  /  TBili  0.5  /  DBili  x   /  AST  19  /  ALT  30  /  AlkPhos  174<H>  -        MICROBIOLOGY:        EKG:   IMGAGING:    ASSESSMENT:  HPI:  40 y/o male with PMHx of ESRD on MWF HD (via LUE AVF), DM (on insulin), cocaine abuse, on home O2 (4L NC), morbid obesity, Bipolar disorder, schizophrenia and HTN presents to the ED with chief complaint of shortness of breath x2 days and worsening LE edema. He reports being dialyzed for a full 5 hour session today and insists he is compliant with all medications. He complains of pain in both legs bilaterally over this time period as well. He denies chest pain, dizziness, nausea, vomiting or any other symptoms at this time.    Outpatient nephrologist: Dr. La  Outpatient dialysis center: [facility]  Last hemodialysis session: 19 for 5 hours  Dialysis access: LUE AVF (16 Aug 2019 21:17)      PLAN: re-ordered one time dose of refused medications:  1. Hydralazine 50 mg PO x 1  2. Mirtazapine 7.5 mg PO x 1   3. Risperidone 1 mg PO x 1  4. Simvastatin 40 mg x 1      Nausea/vomitin. Zofran 4 mg IV PRN x 6 hours nausea/vomiting  2. Protonix 40 mg IV daily.

## 2019-08-18 LAB
ALBUMIN SERPL ELPH-MCNC: 3.1 G/DL — LOW (ref 3.5–5)
ALP SERPL-CCNC: 174 U/L — HIGH (ref 40–120)
ALT FLD-CCNC: 24 U/L DA — SIGNIFICANT CHANGE UP (ref 10–60)
ANION GAP SERPL CALC-SCNC: 5 MMOL/L — SIGNIFICANT CHANGE UP (ref 5–17)
AST SERPL-CCNC: 18 U/L — SIGNIFICANT CHANGE UP (ref 10–40)
BASOPHILS # BLD AUTO: 0.03 K/UL — SIGNIFICANT CHANGE UP (ref 0–0.2)
BASOPHILS NFR BLD AUTO: 0.4 % — SIGNIFICANT CHANGE UP (ref 0–2)
BILIRUB SERPL-MCNC: 0.6 MG/DL — SIGNIFICANT CHANGE UP (ref 0.2–1.2)
BUN SERPL-MCNC: 31 MG/DL — HIGH (ref 7–18)
CALCIUM SERPL-MCNC: 9.1 MG/DL — SIGNIFICANT CHANGE UP (ref 8.4–10.5)
CHLORIDE SERPL-SCNC: 94 MMOL/L — LOW (ref 96–108)
CO2 SERPL-SCNC: 34 MMOL/L — HIGH (ref 22–31)
CREAT SERPL-MCNC: 6.29 MG/DL — HIGH (ref 0.5–1.3)
EOSINOPHIL # BLD AUTO: 0.27 K/UL — SIGNIFICANT CHANGE UP (ref 0–0.5)
EOSINOPHIL NFR BLD AUTO: 3.7 % — SIGNIFICANT CHANGE UP (ref 0–6)
GLUCOSE BLDC GLUCOMTR-MCNC: 123 MG/DL — HIGH (ref 70–99)
GLUCOSE BLDC GLUCOMTR-MCNC: 126 MG/DL — HIGH (ref 70–99)
GLUCOSE BLDC GLUCOMTR-MCNC: 138 MG/DL — HIGH (ref 70–99)
GLUCOSE BLDC GLUCOMTR-MCNC: 227 MG/DL — HIGH (ref 70–99)
GLUCOSE SERPL-MCNC: 115 MG/DL — HIGH (ref 70–99)
HBA1C BLD-MCNC: 5.5 % — SIGNIFICANT CHANGE UP (ref 4–5.6)
HCT VFR BLD CALC: 33.6 % — LOW (ref 39–50)
HGB BLD-MCNC: 9.7 G/DL — LOW (ref 13–17)
IMM GRANULOCYTES NFR BLD AUTO: 0.3 % — SIGNIFICANT CHANGE UP (ref 0–1.5)
LYMPHOCYTES # BLD AUTO: 1.04 K/UL — SIGNIFICANT CHANGE UP (ref 1–3.3)
LYMPHOCYTES # BLD AUTO: 14.3 % — SIGNIFICANT CHANGE UP (ref 13–44)
MAGNESIUM SERPL-MCNC: 2.4 MG/DL — SIGNIFICANT CHANGE UP (ref 1.6–2.6)
MCHC RBC-ENTMCNC: 26.9 PG — LOW (ref 27–34)
MCHC RBC-ENTMCNC: 28.9 GM/DL — LOW (ref 32–36)
MCV RBC AUTO: 93.1 FL — SIGNIFICANT CHANGE UP (ref 80–100)
MONOCYTES # BLD AUTO: 0.99 K/UL — HIGH (ref 0–0.9)
MONOCYTES NFR BLD AUTO: 13.6 % — SIGNIFICANT CHANGE UP (ref 2–14)
NEUTROPHILS # BLD AUTO: 4.91 K/UL — SIGNIFICANT CHANGE UP (ref 1.8–7.4)
NEUTROPHILS NFR BLD AUTO: 67.7 % — SIGNIFICANT CHANGE UP (ref 43–77)
NRBC # BLD: 0 /100 WBCS — SIGNIFICANT CHANGE UP (ref 0–0)
PHOSPHATE SERPL-MCNC: 6.1 MG/DL — HIGH (ref 2.5–4.5)
PLATELET # BLD AUTO: 286 K/UL — SIGNIFICANT CHANGE UP (ref 150–400)
POTASSIUM SERPL-MCNC: 4.5 MMOL/L — SIGNIFICANT CHANGE UP (ref 3.5–5.3)
POTASSIUM SERPL-SCNC: 4.5 MMOL/L — SIGNIFICANT CHANGE UP (ref 3.5–5.3)
PROT SERPL-MCNC: 7.7 G/DL — SIGNIFICANT CHANGE UP (ref 6–8.3)
RBC # BLD: 3.61 M/UL — LOW (ref 4.2–5.8)
RBC # FLD: 17.4 % — HIGH (ref 10.3–14.5)
SODIUM SERPL-SCNC: 133 MMOL/L — LOW (ref 135–145)
WBC # BLD: 7.26 K/UL — SIGNIFICANT CHANGE UP (ref 3.8–10.5)
WBC # FLD AUTO: 7.26 K/UL — SIGNIFICANT CHANGE UP (ref 3.8–10.5)

## 2019-08-18 RX ORDER — METHOCARBAMOL 500 MG/1
500 TABLET, FILM COATED ORAL ONCE
Refills: 0 | Status: COMPLETED | OUTPATIENT
Start: 2019-08-18 | End: 2019-08-18

## 2019-08-18 RX ORDER — ERYTHROPOIETIN 10000 [IU]/ML
4000 INJECTION, SOLUTION INTRAVENOUS; SUBCUTANEOUS
Refills: 0 | Status: DISCONTINUED | OUTPATIENT
Start: 2019-08-18 | End: 2019-08-20

## 2019-08-18 RX ORDER — METHOCARBAMOL 500 MG/1
500 TABLET, FILM COATED ORAL EVERY 12 HOURS
Refills: 0 | Status: DISCONTINUED | OUTPATIENT
Start: 2019-08-18 | End: 2019-08-20

## 2019-08-18 RX ADMIN — Medication 40 MILLIGRAM(S): at 05:33

## 2019-08-18 RX ADMIN — Medication 5 UNIT(S): at 17:07

## 2019-08-18 RX ADMIN — TRAMADOL HYDROCHLORIDE 50 MILLIGRAM(S): 50 TABLET ORAL at 22:02

## 2019-08-18 RX ADMIN — Medication 25 MILLIGRAM(S): at 05:33

## 2019-08-18 RX ADMIN — TRAMADOL HYDROCHLORIDE 50 MILLIGRAM(S): 50 TABLET ORAL at 21:32

## 2019-08-18 RX ADMIN — Medication 2: at 17:08

## 2019-08-18 RX ADMIN — PANTOPRAZOLE SODIUM 40 MILLIGRAM(S): 20 TABLET, DELAYED RELEASE ORAL at 11:45

## 2019-08-18 RX ADMIN — Medication 50 MILLIGRAM(S): at 05:34

## 2019-08-18 RX ADMIN — Medication 50 MILLIGRAM(S): at 21:31

## 2019-08-18 RX ADMIN — AMLODIPINE BESYLATE 5 MILLIGRAM(S): 2.5 TABLET ORAL at 05:33

## 2019-08-18 RX ADMIN — RISPERIDONE 1 MILLIGRAM(S): 4 TABLET ORAL at 21:31

## 2019-08-18 RX ADMIN — CLOPIDOGREL BISULFATE 75 MILLIGRAM(S): 75 TABLET, FILM COATED ORAL at 11:45

## 2019-08-18 RX ADMIN — HEPARIN SODIUM 5000 UNIT(S): 5000 INJECTION INTRAVENOUS; SUBCUTANEOUS at 05:33

## 2019-08-18 RX ADMIN — TRAMADOL HYDROCHLORIDE 50 MILLIGRAM(S): 50 TABLET ORAL at 13:04

## 2019-08-18 RX ADMIN — SIMVASTATIN 40 MILLIGRAM(S): 20 TABLET, FILM COATED ORAL at 21:31

## 2019-08-18 RX ADMIN — HEPARIN SODIUM 5000 UNIT(S): 5000 INJECTION INTRAVENOUS; SUBCUTANEOUS at 17:11

## 2019-08-18 RX ADMIN — Medication 25 MILLIGRAM(S): at 17:12

## 2019-08-18 RX ADMIN — Medication 5 UNIT(S): at 11:45

## 2019-08-18 RX ADMIN — METHOCARBAMOL 500 MILLIGRAM(S): 500 TABLET, FILM COATED ORAL at 14:25

## 2019-08-18 RX ADMIN — METHOCARBAMOL 500 MILLIGRAM(S): 500 TABLET, FILM COATED ORAL at 23:31

## 2019-08-18 RX ADMIN — Medication 5 UNIT(S): at 08:32

## 2019-08-18 RX ADMIN — TRAMADOL HYDROCHLORIDE 50 MILLIGRAM(S): 50 TABLET ORAL at 11:22

## 2019-08-18 RX ADMIN — Medication 50 MILLIGRAM(S): at 14:24

## 2019-08-18 RX ADMIN — Medication 40 MILLIGRAM(S): at 17:11

## 2019-08-18 RX ADMIN — MIRTAZAPINE 7.5 MILLIGRAM(S): 45 TABLET, ORALLY DISINTEGRATING ORAL at 21:31

## 2019-08-18 RX ADMIN — Medication 10 MILLIGRAM(S): at 11:45

## 2019-08-18 NOTE — PHYSICAL THERAPY INITIAL EVALUATION ADULT - RANGE OF MOTION EXAMINATION, REHAB EVAL
bilateral upper extremity ROM was WNL (within normal limits)/bilateral lower extremity was ROM was WNL (within normal limits)/except both knees limited due to pain./deficits as listed below

## 2019-08-18 NOTE — PHYSICAL THERAPY INITIAL EVALUATION ADULT - GENERAL OBSERVATIONS, REHAB EVAL
Chart (EMR) reviewed. Received supine c HOB elevated, NAD. +O2 via nasal cannula, +BLE edema. LUE precaution. Alert. Ox4. Able to follow multistep commands/directions.

## 2019-08-18 NOTE — PROGRESS NOTE ADULT - SUBJECTIVE AND OBJECTIVE BOX
Patient is a 39y Male with  ESRD  ON  HD   , HAD  EXTRA  SESSION  YESTERDAY, TOLERATED  IT  WELL   EATING  LUNCH   CONTINUES  TO  HAVE PAIN  IN  HIS  LEGS  NO  ACUTE  EVENT OVERNIGHT    aspirin (Short breath)  aspirin (Swelling)  penicillin (Short breath)  penicillins (Swelling)  shellfish (Unknown)    Hospital Medications:   MEDICATIONS  (STANDING):  amLODIPine   Tablet 5 milliGRAM(s) Oral daily  clopidogrel Tablet 75 milliGRAM(s) Oral daily  furosemide   Injectable 40 milliGRAM(s) IV Push every 12 hours  gabapentin 300 milliGRAM(s) Oral at bedtime  heparin  Injectable 5000 Unit(s) SubCutaneous every 12 hours  hydrALAZINE 50 milliGRAM(s) Oral three times a day  insulin lispro (HumaLOG) corrective regimen sliding scale   SubCutaneous three times a day before meals  insulin lispro Injectable (HumaLOG) 5 Unit(s) SubCutaneous three times a day before meals  metoprolol tartrate 25 milliGRAM(s) Oral two times a day  mirtazapine 7.5 milliGRAM(s) Oral at bedtime  pantoprazole  Injectable 40 milliGRAM(s) IV Push daily  PARoxetine 10 milliGRAM(s) Oral daily  risperiDONE   Tablet 1 milliGRAM(s) Oral at bedtime  simvastatin 40 milliGRAM(s) Oral at bedtime  simvastatin 40 milliGRAM(s) Oral at bedtime    REVIEW OF SYSTEMS:  HAS NO   FEVER  CHILLS   NO   SOB  OR  COUGH   NO CH  PAIN  / PALPITATIONS   APPETITE IS GOOD, NO  N/V  OR  ABD  PAIN  VOIDING  WELL  LEG  SWELLING  BETTER  THOUGH  HAS PAIN  IN HIS LEGS  BILAT    VITALS:  T(F): 97.7 (08-18-19 @ 05:28), Max: 98.5 (08-17-19 @ 20:30)  HR: 89 (08-18-19 @ 05:28)  BP: 136/72 (08-18-19 @ 05:28)  RR: 16 (08-18-19 @ 05:28)  SpO2: 100% (08-18-19 @ 05:28)  Wt(kg): --      PHYSICAL EXAM:  Constitutional: NAD  HEENT:CONJ  PINK  Neck: No JVD  Respiratory: CTAB, no wheezes, rales or rhonchi  Cardiovascular: S1, S2, RRR  Gastrointestinal: BS+, soft, NT/ND, OBESE  Extremities:  peripheral edema 2+  Neurological: A/O x 3,   Psychiatric: Normal mood, normal affect  : No hernandez.   Vascular Access: AVF  L  UPPER  ARM    LABS:  08-18    133<L>  |  94<L>  |  31<H>  ----------------------------<  115<H>  4.5   |  34<H>  |  6.29<H>    Ca    9.1      18 Aug 2019 07:40  Phos  6.1     08-18  Mg     2.4     08-18    TPro  7.7  /  Alb  3.1<L>  /  TBili  0.6  /  DBili      /  AST  18  /  ALT  24  /  AlkPhos  174<H>  08-18    Creatinine Trend: 6.29 <--, 6.95 <--, 5.41 <--                        9.7    7.26  )-----------( 286      ( 18 Aug 2019 07:40 )             33.6     Urine Studies:      RADIOLOGY & ADDITIONAL STUDIES:

## 2019-08-18 NOTE — PHYSICAL THERAPY INITIAL EVALUATION ADULT - CRITERIA FOR SKILLED THERAPEUTIC INTERVENTIONS
rehab potential/impairments found/functional limitations in following categories/risk reduction/prevention/therapy frequency

## 2019-08-18 NOTE — PHYSICAL THERAPY INITIAL EVALUATION ADULT - PERTINENT HX OF CURRENT PROBLEM, REHAB EVAL
Patient is a 40 y/o male admitted to [facility] due to SOB, pain to BLE and worsening edema.

## 2019-08-18 NOTE — PHYSICAL THERAPY INITIAL EVALUATION ADULT - ACTIVE RANGE OF MOTION EXAMINATION, REHAB EVAL
jeremy. upper extremity Active ROM was WNL (within normal limits)/bilateral lower extremity Active ROM was WNL (within normal limits)/deficits as listed below

## 2019-08-18 NOTE — PHYSICAL THERAPY INITIAL EVALUATION ADULT - ADDITIONAL COMMENTS
Patient lives c parents in an apt bldg c elevator c 5 entry steps c B/L rails (reachable), 1 level inside home. Has home O2 (4L). Independent c all ADL's and ambulation with straight cane prn.

## 2019-08-18 NOTE — CHART NOTE - NSCHARTNOTEFT_GEN_A_CORE
39 year old male admitted for le edema and sob.  Pt morbidly obese with probable sleep apnea, esrd on hd, with chronic le pain.  Pt with pain of bilateral knees.  Pt is complaining of bilateral knee pain.  No iv opioids.  No nsaids.  Tramadol 50mg po q 6 hours prn.  will order Robaxin prn.  stool softeners

## 2019-08-19 ENCOUNTER — TRANSCRIPTION ENCOUNTER (OUTPATIENT)
Age: 39
End: 2019-08-19

## 2019-08-19 LAB
ALBUMIN SERPL ELPH-MCNC: 3 G/DL — LOW (ref 3.5–5)
ALP SERPL-CCNC: 178 U/L — HIGH (ref 40–120)
ALT FLD-CCNC: 22 U/L DA — SIGNIFICANT CHANGE UP (ref 10–60)
ANION GAP SERPL CALC-SCNC: 7 MMOL/L — SIGNIFICANT CHANGE UP (ref 5–17)
AST SERPL-CCNC: 15 U/L — SIGNIFICANT CHANGE UP (ref 10–40)
BASOPHILS # BLD AUTO: 0.02 K/UL — SIGNIFICANT CHANGE UP (ref 0–0.2)
BASOPHILS NFR BLD AUTO: 0.3 % — SIGNIFICANT CHANGE UP (ref 0–2)
BILIRUB SERPL-MCNC: 0.6 MG/DL — SIGNIFICANT CHANGE UP (ref 0.2–1.2)
BUN SERPL-MCNC: 55 MG/DL — HIGH (ref 7–18)
CALCIUM SERPL-MCNC: 9.4 MG/DL — SIGNIFICANT CHANGE UP (ref 8.4–10.5)
CHLORIDE SERPL-SCNC: 93 MMOL/L — LOW (ref 96–108)
CO2 SERPL-SCNC: 31 MMOL/L — SIGNIFICANT CHANGE UP (ref 22–31)
CREAT SERPL-MCNC: 8.93 MG/DL — HIGH (ref 0.5–1.3)
EOSINOPHIL # BLD AUTO: 0.31 K/UL — SIGNIFICANT CHANGE UP (ref 0–0.5)
EOSINOPHIL NFR BLD AUTO: 4.2 % — SIGNIFICANT CHANGE UP (ref 0–6)
GLUCOSE BLDC GLUCOMTR-MCNC: 116 MG/DL — HIGH (ref 70–99)
GLUCOSE BLDC GLUCOMTR-MCNC: 132 MG/DL — HIGH (ref 70–99)
GLUCOSE BLDC GLUCOMTR-MCNC: 144 MG/DL — HIGH (ref 70–99)
GLUCOSE BLDC GLUCOMTR-MCNC: 99 MG/DL — SIGNIFICANT CHANGE UP (ref 70–99)
GLUCOSE SERPL-MCNC: 133 MG/DL — HIGH (ref 70–99)
HCT VFR BLD CALC: 31.6 % — LOW (ref 39–50)
HGB BLD-MCNC: 9.4 G/DL — LOW (ref 13–17)
IMM GRANULOCYTES NFR BLD AUTO: 0.3 % — SIGNIFICANT CHANGE UP (ref 0–1.5)
LYMPHOCYTES # BLD AUTO: 0.92 K/UL — LOW (ref 1–3.3)
LYMPHOCYTES # BLD AUTO: 12.6 % — LOW (ref 13–44)
MAGNESIUM SERPL-MCNC: 2.6 MG/DL — SIGNIFICANT CHANGE UP (ref 1.6–2.6)
MCHC RBC-ENTMCNC: 27.3 PG — SIGNIFICANT CHANGE UP (ref 27–34)
MCHC RBC-ENTMCNC: 29.7 GM/DL — LOW (ref 32–36)
MCV RBC AUTO: 91.9 FL — SIGNIFICANT CHANGE UP (ref 80–100)
MONOCYTES # BLD AUTO: 1.04 K/UL — HIGH (ref 0–0.9)
MONOCYTES NFR BLD AUTO: 14.2 % — HIGH (ref 2–14)
NEUTROPHILS # BLD AUTO: 5.01 K/UL — SIGNIFICANT CHANGE UP (ref 1.8–7.4)
NEUTROPHILS NFR BLD AUTO: 68.4 % — SIGNIFICANT CHANGE UP (ref 43–77)
NRBC # BLD: 0 /100 WBCS — SIGNIFICANT CHANGE UP (ref 0–0)
PHOSPHATE SERPL-MCNC: 7.4 MG/DL — HIGH (ref 2.5–4.5)
PLATELET # BLD AUTO: 263 K/UL — SIGNIFICANT CHANGE UP (ref 150–400)
POTASSIUM SERPL-MCNC: 4.8 MMOL/L — SIGNIFICANT CHANGE UP (ref 3.5–5.3)
POTASSIUM SERPL-SCNC: 4.8 MMOL/L — SIGNIFICANT CHANGE UP (ref 3.5–5.3)
PROT SERPL-MCNC: 7.5 G/DL — SIGNIFICANT CHANGE UP (ref 6–8.3)
RBC # BLD: 3.44 M/UL — LOW (ref 4.2–5.8)
RBC # FLD: 17.7 % — HIGH (ref 10.3–14.5)
SODIUM SERPL-SCNC: 131 MMOL/L — LOW (ref 135–145)
WBC # BLD: 7.32 K/UL — SIGNIFICANT CHANGE UP (ref 3.8–10.5)
WBC # FLD AUTO: 7.32 K/UL — SIGNIFICANT CHANGE UP (ref 3.8–10.5)

## 2019-08-19 RX ORDER — ERYTHROPOIETIN 10000 [IU]/ML
4000 INJECTION, SOLUTION INTRAVENOUS; SUBCUTANEOUS
Qty: 0 | Refills: 0 | DISCHARGE
Start: 2019-08-19

## 2019-08-19 RX ADMIN — TRAMADOL HYDROCHLORIDE 50 MILLIGRAM(S): 50 TABLET ORAL at 12:12

## 2019-08-19 RX ADMIN — HEPARIN SODIUM 5000 UNIT(S): 5000 INJECTION INTRAVENOUS; SUBCUTANEOUS at 17:22

## 2019-08-19 RX ADMIN — Medication 25 MILLIGRAM(S): at 17:21

## 2019-08-19 RX ADMIN — RISPERIDONE 1 MILLIGRAM(S): 4 TABLET ORAL at 21:33

## 2019-08-19 RX ADMIN — METHOCARBAMOL 500 MILLIGRAM(S): 500 TABLET, FILM COATED ORAL at 22:58

## 2019-08-19 RX ADMIN — TRAMADOL HYDROCHLORIDE 50 MILLIGRAM(S): 50 TABLET ORAL at 13:00

## 2019-08-19 RX ADMIN — Medication 40 MILLIGRAM(S): at 05:48

## 2019-08-19 RX ADMIN — Medication 10 MILLIGRAM(S): at 12:09

## 2019-08-19 RX ADMIN — Medication 50 MILLIGRAM(S): at 05:48

## 2019-08-19 RX ADMIN — Medication 50 MILLIGRAM(S): at 14:16

## 2019-08-19 RX ADMIN — AMLODIPINE BESYLATE 5 MILLIGRAM(S): 2.5 TABLET ORAL at 05:48

## 2019-08-19 RX ADMIN — CLOPIDOGREL BISULFATE 75 MILLIGRAM(S): 75 TABLET, FILM COATED ORAL at 12:09

## 2019-08-19 RX ADMIN — PANTOPRAZOLE SODIUM 40 MILLIGRAM(S): 20 TABLET, DELAYED RELEASE ORAL at 12:10

## 2019-08-19 RX ADMIN — ONDANSETRON 4 MILLIGRAM(S): 8 TABLET, FILM COATED ORAL at 19:20

## 2019-08-19 RX ADMIN — Medication 5 UNIT(S): at 17:21

## 2019-08-19 RX ADMIN — SIMVASTATIN 40 MILLIGRAM(S): 20 TABLET, FILM COATED ORAL at 21:33

## 2019-08-19 RX ADMIN — Medication 5 UNIT(S): at 08:23

## 2019-08-19 RX ADMIN — TRAMADOL HYDROCHLORIDE 50 MILLIGRAM(S): 50 TABLET ORAL at 18:36

## 2019-08-19 RX ADMIN — Medication 40 MILLIGRAM(S): at 17:21

## 2019-08-19 RX ADMIN — Medication 5 UNIT(S): at 12:09

## 2019-08-19 RX ADMIN — MIRTAZAPINE 7.5 MILLIGRAM(S): 45 TABLET, ORALLY DISINTEGRATING ORAL at 21:33

## 2019-08-19 RX ADMIN — Medication 50 MILLIGRAM(S): at 21:33

## 2019-08-19 RX ADMIN — Medication 25 MILLIGRAM(S): at 05:48

## 2019-08-19 RX ADMIN — TRAMADOL HYDROCHLORIDE 50 MILLIGRAM(S): 50 TABLET ORAL at 18:57

## 2019-08-19 NOTE — PROGRESS NOTE ADULT - SUBJECTIVE AND OBJECTIVE BOX
HPI:  40 y/o male with PMHx of ESRD on MWF HD (via LUE AVF), DM (on insulin), cocaine abuse, on home O2 (4L NC), morbid obesity, Bipolar disorder, schizophrenia and HTN presents to the ED with chief complaint of shortness of breath x2 days and worsening LE edema. He reports being dialyzed for a full 5 hour session today and insists he is compliant with all medications. He complains of pain in both legs bilaterally over this time period as well. He denies chest pain, dizziness, nausea, vomiting or any other symptoms at this time.    Outpatient nephrologist: Dr. La  Outpatient dialysis center: [facility] [place]  Last hemodialysis session: 8/16/19 for 5 hours  Dialysis access: LUE AVF (16 Aug 2019 21:17)      Patient is a 39y old  Male who presents with a chief complaint of sob (19 Aug 2019 12:53)      INTERVAL HPI/OVERNIGHT EVENTS:  T(C): 36.5 (08-19-19 @ 05:22), Max: 37 (08-18-19 @ 14:28)  HR: 95 (08-19-19 @ 05:22) (84 - 95)  BP: 112/68 (08-19-19 @ 05:22) (112/68 - 156/92)  RR: 18 (08-19-19 @ 05:22) (17 - 18)  SpO2: 95% (08-19-19 @ 05:22) (95% - 100%)  Wt(kg): --  I&O's Summary      REVIEW OF SYSTEMS: denies fever, chills, SOB, palpitations, chest pain, abdominal pain, nausea, vomitting, diarrhea, constipation, dizziness    MEDICATIONS  (STANDING):  amLODIPine   Tablet 5 milliGRAM(s) Oral daily  clopidogrel Tablet 75 milliGRAM(s) Oral daily  epoetin cyndie Injectable 4000 Unit(s) IV Push <User Schedule>  furosemide   Injectable 40 milliGRAM(s) IV Push every 12 hours  gabapentin 300 milliGRAM(s) Oral at bedtime  heparin  Injectable 5000 Unit(s) SubCutaneous every 12 hours  hydrALAZINE 50 milliGRAM(s) Oral three times a day  insulin lispro (HumaLOG) corrective regimen sliding scale   SubCutaneous three times a day before meals  insulin lispro Injectable (HumaLOG) 5 Unit(s) SubCutaneous three times a day before meals  metoprolol tartrate 25 milliGRAM(s) Oral two times a day  mirtazapine 7.5 milliGRAM(s) Oral at bedtime  pantoprazole  Injectable 40 milliGRAM(s) IV Push daily  PARoxetine 10 milliGRAM(s) Oral daily  risperiDONE   Tablet 1 milliGRAM(s) Oral at bedtime  simvastatin 40 milliGRAM(s) Oral at bedtime  simvastatin 40 milliGRAM(s) Oral at bedtime    MEDICATIONS  (PRN):  methocarbamol 500 milliGRAM(s) Oral every 12 hours PRN spasms  ondansetron Injectable 4 milliGRAM(s) IV Push every 6 hours PRN Nausea  traMADol 50 milliGRAM(s) Oral every 6 hours PRN Severe Pain (7 - 10)      PHYSICAL EXAM:  GENERAL: NAD, well-groomed, well-developed  HEAD:  Atraumatic, Normocephalic  EYES: EOMI, PERRLA, conjunctiva and sclera clear  ENMT: No tonsillar erythema, exudates, or enlargement; Moist mucous membranes, Good dentition, No lesions  NECK: Supple, No JVD, Normal thyroid  NERVOUS SYSTEM:  Alert & Oriented X3, Good concentration; Motor Strength 5/5 B/L upper and lower extremities; DTRs 2+ intact and symmetric  CHEST/LUNG: Clear to percussion bilaterally; No rales, rhonchi, wheezing, or rubs  HEART: Regular rate and rhythm; No murmurs, rubs, or gallops  ABDOMEN: Soft, Nontender, Nondistended; Bowel sounds present  EXTREMITIES:  2+ Peripheral Pulses, No clubbing, cyanosis, or edema  LYMPH: No lymphadenopathy noted  SKIN: No rashes or lesions  LABS:                        9.4    7.32  )-----------( 263      ( 19 Aug 2019 12:42 )             31.6     08-18    133<L>  |  94<L>  |  31<H>  ----------------------------<  115<H>  4.5   |  34<H>  |  6.29<H>    Ca    9.1      18 Aug 2019 07:40  Phos  6.1     08-18  Mg     2.4     08-18    TPro  7.7  /  Alb  3.1<L>  /  TBili  0.6  /  DBili  x   /  AST  18  /  ALT  24  /  AlkPhos  174<H>  08-18        CAPILLARY BLOOD GLUCOSE      POCT Blood Glucose.: 116 mg/dL (19 Aug 2019 11:41)  POCT Blood Glucose.: 132 mg/dL (19 Aug 2019 07:49)  POCT Blood Glucose.: 126 mg/dL (18 Aug 2019 20:37)  POCT Blood Glucose.: 227 mg/dL (18 Aug 2019 16:12)

## 2019-08-19 NOTE — DISCHARGE NOTE PROVIDER - NSDCFUSCHEDAPPT_GEN_ALL_CORE_FT
[facility] JOHNSON ; 09/16/2019 ; [facility] [address] ; 09/16/2019 ; [facility] [address] [facility] JOHNSON ; 09/16/2019 ; [facility] [address] ; 09/16/2019 ; [facility] [address]

## 2019-08-19 NOTE — PROGRESS NOTE ADULT - SUBJECTIVE AND OBJECTIVE BOX
[facility] : Progress Note :: [phone], (office [phone]),   Dr La / Dr Hui / Dr Barber / Dr Villalobos / Dr Hang CASTELLANO / Dr Mehta / Dr Sanchez / Dr Alber dumont  _____________________________________________________________________________________________  Patient is a 39y Male with ESRD. denies SOB    aspirin (Short breath)  aspirin (Swelling)  penicillin (Short breath)  penicillins (Swelling)  shellfish (Unknown)    Hospital Medications:   MEDICATIONS  (STANDING):  amLODIPine   Tablet 5 milliGRAM(s) Oral daily  clopidogrel Tablet 75 milliGRAM(s) Oral daily  epoetin cyndie Injectable 4000 Unit(s) IV Push <User Schedule>  furosemide   Injectable 40 milliGRAM(s) IV Push every 12 hours  gabapentin 300 milliGRAM(s) Oral at bedtime  heparin  Injectable 5000 Unit(s) SubCutaneous every 12 hours  hydrALAZINE 50 milliGRAM(s) Oral three times a day  insulin lispro (HumaLOG) corrective regimen sliding scale   SubCutaneous three times a day before meals  insulin lispro Injectable (HumaLOG) 5 Unit(s) SubCutaneous three times a day before meals  metoprolol tartrate 25 milliGRAM(s) Oral two times a day  mirtazapine 7.5 milliGRAM(s) Oral at bedtime  pantoprazole  Injectable 40 milliGRAM(s) IV Push daily  PARoxetine 10 milliGRAM(s) Oral daily  risperiDONE   Tablet 1 milliGRAM(s) Oral at bedtime  simvastatin 40 milliGRAM(s) Oral at bedtime  simvastatin 40 milliGRAM(s) Oral at bedtime        VITALS:  T(F): 97.4 (08-19-19 @ 14:14), Max: 98 (08-18-19 @ 20:24)  HR: 86 (08-19-19 @ 14:36)  BP: 151/91 (08-19-19 @ 14:36)  RR: 17 (08-19-19 @ 14:14)  SpO2: 99% (08-19-19 @ 14:36)  Wt(kg): --      PHYSICAL EXAM:  Constitutional: NAD  HEENT: anicteric sclera, oropharynx clear.  Neck: No JVD  Respiratory: CTAB, no wheezes, rales or rhonchi  Cardiovascular: S1, S2, RRR  Gastrointestinal: BS+, soft, NT/ND  Extremities:  peripheral edema++  Neurological: A/O x 3, no focal deficits  Vascular Access: AVF with thrill and bruit    LABS:  08-19    131<L>  |  93<L>  |  55<H>  ----------------------------<  133<H>  4.8   |  31  |  8.93<H>    Ca    9.4      19 Aug 2019 12:42  Phos  7.4     08-19  Mg     2.6     08-19    TPro  7.5  /  Alb  3.0<L>  /  TBili  0.6  /  DBili      /  AST  15  /  ALT  22  /  AlkPhos  178<H>  08-19    Creatinine Trend: 8.93 <--, 6.29 <--, 6.95 <--, 5.41 <--                        9.4    7.32  )-----------( 263      ( 19 Aug 2019 12:42 )             31.6     Urine Studies:      RADIOLOGY & ADDITIONAL STUDIES:

## 2019-08-19 NOTE — DISCHARGE NOTE PROVIDER - CARE PROVIDER_API CALL
Isac La)  Internal Medicine; Nephrology  [address] [place] [address]  Phone: [phone]  Fax: [phone]  Follow Up Time: Isac La)  Internal Medicine; Nephrology  [address] [place] [address]  Phone: [phone]  Fax: [phone]  Follow Up Time:     Laz Lebron)  Internal Medicine  [address] [place] [address]  Phone: [phone]  Fax: [phone]  Follow Up Time: 1 week

## 2019-08-19 NOTE — DISCHARGE NOTE PROVIDER - PROVIDER TOKENS
PROVIDER:[TOKEN:[[id number]]] PROVIDER:[TOKEN:[[id number]]],PROVIDER:[TOKEN:[[id number]],FOLLOWUP:[1 week]]

## 2019-08-19 NOTE — PROGRESS NOTE ADULT - PROBLEM SELECTOR PLAN 1
-improved  -lungs man  -c/w  diuretics, voiding well  saturating well on 5L NC  daily weights/strict I/Os -improved  -c/w  diuretics, voiding well  saturating well on 5L NC  daily weights/strict I/Os

## 2019-08-19 NOTE — PROGRESS NOTE ADULT - PROBLEM SELECTOR PLAN 3
-a1c-5.5%  -takes novolog 15 units tid pre meal  humalog 5 units tid with hss -a1c-5.5%  - c/w humalog 5 units tid with hss  -monitor FS

## 2019-08-19 NOTE — DISCHARGE NOTE PROVIDER - NSDCFUADDAPPT_GEN_ALL_CORE_FT
[facility]: [address] [place] [address], [phone], 08/21/2019, 5:00 AM.

## 2019-08-19 NOTE — DISCHARGE NOTE PROVIDER - HOSPITAL COURSE
40 y/o male with PMHx of ESRD on MWF HD (via LUE AVF), DM (on insulin), cocaine abuse, on home O2 (4L NC), morbid obesity, Bipolar disorder, schizophrenia and HTN presents to the ED with chief complaint of shortness of breath x2 days and worsening LE edema likely from fluid overload in setting of ESRD. He is s/p urgent HD session on admission and diuresed with IV lasix. He is saturating well on 5L NC. He will resume with his outpatient schedule of HD MWF. He is medically cleared for DC home.            . 40 y/o male with PMHx of ESRD on MWF HD (via LUE AVF), DM (on insulin), cocaine abuse, on home O2 (4L NC), morbid obesity, Bipolar disorder, schizophrenia and HTN presents to the ED with chief complaint of shortness of breath x2 days and worsening LE edema likely from fluid overload in setting of ESRD. He is s/p urgent HD session on admission and diuresed with IV lasix. He is saturating well on 5L NC. He will resume with his outpatient schedule of HD MWF. He is medically cleared for DC home.    .

## 2019-08-19 NOTE — PROGRESS NOTE ADULT - SUBJECTIVE AND OBJECTIVE BOX
38 y/o male with PMHx of ESRD on MWF HD (via [facility]), DM (on insulin), cocaine abuse, on home O2 (4L NC), morbid obesity, Bipolar disorder, schizophrenia and HTN presents to the ED with chief complaint of shortness of breath x2 days and worsening LE edema. He reports being dialyzed for a full 5 hour session today and insists he is compliant with all medications. He complains of pain in both legs bilaterally over this time period as well. He denies chest pain, dizziness, nausea, vomiting or any other symptoms at this time.          INTERVAL HPI/OVERNIGHT EVENTS:AxOx3, reports improvment with breathing,   T(C): 36.3 (08-19-19 @ 14:14), Max: 36.7 (08-18-19 @ 20:24)  HR: 86 (08-19-19 @ 14:36) (84 - 95)  BP: 151/91 (08-19-19 @ 14:36) (112/68 - 159/87)  RR: 17 (08-19-19 @ 14:14) (17 - 18)  SpO2: 99% (08-19-19 @ 14:36) (95% - 100%)  Wt(kg): --  I&O's Summary      PAST MEDICAL & SURGICAL HISTORY:  Migraine  HTN (hypertension)  DM (diabetes mellitus)  Bipolar disorder  Schizophrenia  ESRD on dialysis  Morbid obesity with BMI of 40.0-44.9, adult  H/O hernia repair        LABS:                        9.4    7.32  )-----------( 263      ( 19 Aug 2019 12:42 )             31.6     08-19    131<L>  |  93<L>  |  55<H>  ----------------------------<  133<H>  4.8   |  31  |  8.93<H>    Ca    9.4      19 Aug 2019 12:42  Phos  7.4     08-19  Mg     2.6     08-19    TPro  7.5  /  Alb  3.0<L>  /  TBili  0.6  /  DBili  x   /  AST  15  /  ALT  22  /  AlkPhos  178<H>  08-19        CAPILLARY BLOOD GLUCOSE      POCT Blood Glucose.: 144 mg/dL (19 Aug 2019 16:24)  POCT Blood Glucose.: 116 mg/dL (19 Aug 2019 11:41)  POCT Blood Glucose.: 132 mg/dL (19 Aug 2019 07:49)  POCT Blood Glucose.: 126 mg/dL (18 Aug 2019 20:37)            MEDICATIONS  (STANDING):  amLODIPine   Tablet 5 milliGRAM(s) Oral daily  clopidogrel Tablet 75 milliGRAM(s) Oral daily  epoetin cyndie Injectable 4000 Unit(s) IV Push <User Schedule>  furosemide   Injectable 40 milliGRAM(s) IV Push every 12 hours  gabapentin 300 milliGRAM(s) Oral at bedtime  heparin  Injectable 5000 Unit(s) SubCutaneous every 12 hours  hydrALAZINE 50 milliGRAM(s) Oral three times a day  insulin lispro (HumaLOG) corrective regimen sliding scale   SubCutaneous three times a day before meals  insulin lispro Injectable (HumaLOG) 5 Unit(s) SubCutaneous three times a day before meals  metoprolol tartrate 25 milliGRAM(s) Oral two times a day  mirtazapine 7.5 milliGRAM(s) Oral at bedtime  pantoprazole  Injectable 40 milliGRAM(s) IV Push daily  PARoxetine 10 milliGRAM(s) Oral daily  risperiDONE   Tablet 1 milliGRAM(s) Oral at bedtime  simvastatin 40 milliGRAM(s) Oral at bedtime  simvastatin 40 milliGRAM(s) Oral at bedtime    MEDICATIONS  (PRN):  methocarbamol 500 milliGRAM(s) Oral every 12 hours PRN spasms  ondansetron Injectable 4 milliGRAM(s) IV Push every 6 hours PRN Nausea  traMADol 50 milliGRAM(s) Oral every 6 hours PRN Severe Pain (7 - 10)      REVIEW OF SYSTEMS:  CONSTITUTIONAL: No fever, weight loss, or fatigue  EYES: No eye pain, visual disturbances, or discharge  ENMT:  No difficulty hearing, tinnitus, vertigo; No sinus or throat pain  NECK: No pain or stiffness  RESPIRATORY: No cough, wheezing, chills or hemoptysis; No shortness of breath  CARDIOVASCULAR: No chest pain, palpitations, dizziness, or leg swelling  GASTROINTESTINAL: No abdominal or epigastric pain. No nausea, vomiting, or hematemesis; No diarrhea or constipation. No melena or hematochezia.  GENITOURINARY: No dysuria, frequency, hematuria, or incontinence  NEUROLOGICAL: No headaches, memory loss, loss of strength, numbness, or tremors  SKIN: No itching, burning, rashes, or lesions   LYMPH NODES: No enlarged glands  ENDOCRINE: No heat or cold intolerance; No hair loss  MUSCULOSKELETAL: No joint pain or swelling; No muscle, back, or extremity pain  PSYCHIATRIC: No depression, anxiety, mood swings, or difficulty sleeping  ALLERGY AND IMMUNOLOGIC: No hives or eczema    RADIOLOGY & ADDITIONAL TESTS:  < from: Xray Chest 1 View- PORTABLE-Urgent (08.16.19 @ 17:10) >  XAM:  XR CHEST PORTABLE URGENT 1V                            PROCEDURE DATE:  08/16/2019          INTERPRETATION:  Chest radiograph (one view)     CPT 43344    CLINICAL INFORMATION:  Patient is unable to communicate.  Short of   breath.     TECHNIQUE:  Single frontal view of the chest was obtained.    FINDINGS:  No previous examinations are available for review.    The lungs are clear.  No pleural abnormality is seen.      The heart is prominent in size.      IMPRESSION: No gross consolidationis seen. Prominent cardiac silhouette.                FRANTZ BLANCA M.D., ATTENDING RADIOLOGIST  This document has been electronically signed. Aug 16 2019  5:32PM          < end of copied text >    Imaging Personally Reviewed:  [x ] YES  [ ] NO    Consultant(s) Notes Reviewed:  [x ] YES  [ ] NO    PHYSICAL EXAM:  GENERAL: NAD, well-groomed, well-developed  HEAD:  Atraumatic, Normocephalic  EYES:  conjunctiva and sclera clear  ENMT: No tonsillar erythema, exudates, or enlargement; Moist mucous membranes, Good dentition, No lesions  NECK: Supple, No JVD, Normal thyroid  NERVOUS SYSTEM:  Alert & Oriented X3, Good concentration  CHEST/LUNG: Clear to percussion bilaterally; No rales, rhonchi, wheezing, or rubs  HEART: Regular rate and rhythm; No murmurs, rubs, or gallops  ABDOMEN: Soft, Nontender, Nondistended; Bowel sounds present  EXTREMITIES:  2+ Peripheral Pulses, No clubbing, cyanosis, bipedal  edema  LYMPH: No lymphadenopathy noted  SKIN: No rashes or lesions    Care Collaborated Discussed with Consultants/Other Providers [ x] YES  [ ] NO 40 y/o male with PMHx of ESRD on MWF HD (via [facility]), DM (on insulin), cocaine abuse, on home O2 (4L NC), morbid obesity, Bipolar disorder, schizophrenia and HTN presents to the ED with chief complaint of shortness of breath x2 days and worsening LE edema. He reports being dialyzed for a full 5 hour session today and insists he is compliant with all medications. He complains of pain in both legs bilaterally over this time period as well. He denies chest pain, dizziness, nausea, vomiting or any other symptoms at this time.          INTERVAL HPI/OVERNIGHT EVENTS:AxOx3, reports improvment with breathing, D/C planning  T(C): 36.3 (08-19-19 @ 14:14), Max: 36.7 (08-18-19 @ 20:24)  HR: 86 (08-19-19 @ 14:36) (84 - 95)  BP: 151/91 (08-19-19 @ 14:36) (112/68 - 159/87)  RR: 17 (08-19-19 @ 14:14) (17 - 18)  SpO2: 99% (08-19-19 @ 14:36) (95% - 100%)  Wt(kg): --  I&O's Summary      PAST MEDICAL & SURGICAL HISTORY:  Migraine  HTN (hypertension)  DM (diabetes mellitus)  Bipolar disorder  Schizophrenia  ESRD on dialysis  Morbid obesity with BMI of 40.0-44.9, adult  H/O hernia repair        LABS:                        9.4    7.32  )-----------( 263      ( 19 Aug 2019 12:42 )             31.6     08-19    131<L>  |  93<L>  |  55<H>  ----------------------------<  133<H>  4.8   |  31  |  8.93<H>    Ca    9.4      19 Aug 2019 12:42  Phos  7.4     08-19  Mg     2.6     08-19    TPro  7.5  /  Alb  3.0<L>  /  TBili  0.6  /  DBili  x   /  AST  15  /  ALT  22  /  AlkPhos  178<H>  08-19        CAPILLARY BLOOD GLUCOSE      POCT Blood Glucose.: 144 mg/dL (19 Aug 2019 16:24)  POCT Blood Glucose.: 116 mg/dL (19 Aug 2019 11:41)  POCT Blood Glucose.: 132 mg/dL (19 Aug 2019 07:49)  POCT Blood Glucose.: 126 mg/dL (18 Aug 2019 20:37)            MEDICATIONS  (STANDING):  amLODIPine   Tablet 5 milliGRAM(s) Oral daily  clopidogrel Tablet 75 milliGRAM(s) Oral daily  epoetin cyndie Injectable 4000 Unit(s) IV Push <User Schedule>  furosemide   Injectable 40 milliGRAM(s) IV Push every 12 hours  gabapentin 300 milliGRAM(s) Oral at bedtime  heparin  Injectable 5000 Unit(s) SubCutaneous every 12 hours  hydrALAZINE 50 milliGRAM(s) Oral three times a day  insulin lispro (HumaLOG) corrective regimen sliding scale   SubCutaneous three times a day before meals  insulin lispro Injectable (HumaLOG) 5 Unit(s) SubCutaneous three times a day before meals  metoprolol tartrate 25 milliGRAM(s) Oral two times a day  mirtazapine 7.5 milliGRAM(s) Oral at bedtime  pantoprazole  Injectable 40 milliGRAM(s) IV Push daily  PARoxetine 10 milliGRAM(s) Oral daily  risperiDONE   Tablet 1 milliGRAM(s) Oral at bedtime  simvastatin 40 milliGRAM(s) Oral at bedtime  simvastatin 40 milliGRAM(s) Oral at bedtime    MEDICATIONS  (PRN):  methocarbamol 500 milliGRAM(s) Oral every 12 hours PRN spasms  ondansetron Injectable 4 milliGRAM(s) IV Push every 6 hours PRN Nausea  traMADol 50 milliGRAM(s) Oral every 6 hours PRN Severe Pain (7 - 10)      REVIEW OF SYSTEMS:  CONSTITUTIONAL: No fever, weight loss, or fatigue  EYES: No eye pain, visual disturbances, or discharge  ENMT:  No difficulty hearing, tinnitus, vertigo; No sinus or throat pain  NECK: No pain or stiffness  RESPIRATORY: No cough, wheezing, chills or hemoptysis; No shortness of breath  CARDIOVASCULAR: No chest pain, palpitations, dizziness, or leg swelling  GASTROINTESTINAL: No abdominal or epigastric pain. No nausea, vomiting, or hematemesis; No diarrhea or constipation. No melena or hematochezia.  GENITOURINARY: No dysuria, frequency, hematuria, or incontinence  NEUROLOGICAL: No headaches, memory loss, loss of strength, numbness, or tremors  SKIN: No itching, burning, rashes, or lesions   LYMPH NODES: No enlarged glands  ENDOCRINE: No heat or cold intolerance; No hair loss  MUSCULOSKELETAL: No joint pain or swelling; No muscle, back, or extremity pain  PSYCHIATRIC: No depression, anxiety, mood swings, or difficulty sleeping  ALLERGY AND IMMUNOLOGIC: No hives or eczema    RADIOLOGY & ADDITIONAL TESTS:  < from: Xray Chest 1 View- PORTABLE-Urgent (08.16.19 @ 17:10) >  XAM:  XR CHEST PORTABLE URGENT 1V                            PROCEDURE DATE:  08/16/2019          INTERPRETATION:  Chest radiograph (one view)     CPT 89240    CLINICAL INFORMATION:  Patient is unable to communicate.  Short of   breath.     TECHNIQUE:  Single frontal view of the chest was obtained.    FINDINGS:  No previous examinations are available for review.    The lungs are clear.  No pleural abnormality is seen.      The heart is prominent in size.      IMPRESSION: No gross consolidationis seen. Prominent cardiac silhouette.                FRANTZ BLANCA M.D., ATTENDING RADIOLOGIST  This document has been electronically signed. Aug 16 2019  5:32PM          < end of copied text >    Imaging Personally Reviewed:  [x ] YES  [ ] NO    Consultant(s) Notes Reviewed:  [x ] YES  [ ] NO    PHYSICAL EXAM:  GENERAL: NAD, well-groomed, well-developed  HEAD:  Atraumatic, Normocephalic  EYES:  conjunctiva and sclera clear  ENMT: No tonsillar erythema, exudates, or enlargement; Moist mucous membranes, Good dentition, No lesions  NECK: Supple, No JVD, Normal thyroid  NERVOUS SYSTEM:  Alert & Oriented X3, Good concentration  CHEST/LUNG: Clear to percussion bilaterally; No rales, rhonchi, wheezing, or rubs  HEART: Regular rate and rhythm; No murmurs, rubs, or gallops  ABDOMEN: Soft, Nontender, Nondistended; Bowel sounds present  EXTREMITIES:  2+ Peripheral Pulses, No clubbing, cyanosis, bipedal  edema  LYMPH: No lymphadenopathy noted  SKIN: No rashes or lesions    Care Collaborated Discussed with Consultants/Other Providers [ x] YES  [ ] NO

## 2019-08-19 NOTE — DISCHARGE NOTE PROVIDER - NSDCCPCAREPLAN_GEN_ALL_CORE_FT
PRINCIPAL DISCHARGE DIAGNOSIS  Diagnosis: Fluid overload, unspecified  Assessment and Plan of Treatment: You presented with shortness of breathe and LE swelling likely secondary to fluid overload which improved after you had urgent dialysis and IV lasix.      SECONDARY DISCHARGE DIAGNOSES  Diagnosis: ESRD on dialysis  Assessment and Plan of Treatment: Continue with your HD MAvoid taking (NSAIDs) - (ex: Ibuprofen, Advil, Celebrex, Naprosyn)  Avoid taking any nephrotoxic agents (can harm kidneys) - Intravenous contrast for diagnostic testing, combination cold medications.  Have all medications adjusted for your renal function by your Health Care Provider.  Blood pressure control is important.  Take all medication as prescribed.    Diagnosis: DM (diabetes mellitus)  Assessment and Plan of Treatment: DM (diabetes mellitus)    Diagnosis: Bipolar disorder  Assessment and Plan of Treatment: Bipolar disorder    Diagnosis: HTN (hypertension)  Assessment and Plan of Treatment: HTN (hypertension) PRINCIPAL DISCHARGE DIAGNOSIS  Diagnosis: Fluid overload, unspecified  Assessment and Plan of Treatment: You presented with shortness of breathe and LE swelling likely secondary to fluid overload which improved after you had urgent dialysis and IV lasix.      SECONDARY DISCHARGE DIAGNOSES  Diagnosis: ESRD on dialysis  Assessment and Plan of Treatment: Continue with your HD MAvoid taking (NSAIDs) - (ex: Ibuprofen, Advil, Celebrex, Naprosyn)  Avoid taking any nephrotoxic agents (can harm kidneys) - Intravenous contrast for diagnostic testing, combination cold medications.  Have all medications adjusted for your renal function by your Health Care Provider.  Blood pressure control is important.  Take all medication as prescribed.    Diagnosis: DM (diabetes mellitus)  Assessment and Plan of Treatment: HgA1C this admission 5.5   Make sure you get your HgA1c checked every three months.  If you take oral diabetes medications, check your blood glucose two times a day.  If you take insulin, check your blood glucose before meals and at bedtime.  It's important not to skip any meals.  Keep a log of your blood glucose results and always take it with you to your doctor appointments.  Keep a list of your current medications including injectables and over the counter medications and bring this medication list with you to all your doctor appointments.  If you have not seen your ophthalmologist this year call for appointment.  Check your feet daily for redness, sores, or openings. Do not self treat. If no improvement in two days call your primary care physician for an appointment.  Low blood sugar (hypoglycemia) is a blood sugar below 70mg/dl. Check your blood sugar if you feel signs/symptoms of hypoglycemia. If your blood sugar is below 70 take 15 grams of carbohydrates (ex 4 oz of apple juice, 3-4 glucose tablets, or 4-6 oz of regular soda) wait 15 minutes and repeat blood sugar to make sure it comes up above 70.  If your blood sugar is above 70 and you are due for a meal, have a meal.  If you are not due for a meal have a snack.  This snack helps keeps your blood sugar at a safe range.    Diagnosis: Bipolar disorder  Assessment and Plan of Treatment: Continue with your current treatment plan    Diagnosis: HTN (hypertension)  Assessment and Plan of Treatment: Low salt diet  Activity as tolerated.  Take all medication as prescribed.  Follow up with your medical doctor for routine blood pressure monitoring at your next visit.  Notify your doctor if you have any of the following symptoms:   Dizziness, Lightheadedness, Blurry vision, Headache, Chest pain, Shortness of breath PRINCIPAL DISCHARGE DIAGNOSIS  Diagnosis: Fluid overload, unspecified  Assessment and Plan of Treatment: You presented with shortness of breathe and LE swelling likely secondary to fluid overload which improved after you had urgent dialysis and IV lasix.      SECONDARY DISCHARGE DIAGNOSES  Diagnosis: Bipolar disorder  Assessment and Plan of Treatment: Continue with your current treatment plan    Diagnosis: DM (diabetes mellitus)  Assessment and Plan of Treatment: HgA1C this admission 5.5   Make sure you get your HgA1c checked every three months.  If you take oral diabetes medications, check your blood glucose two times a day.  If you take insulin, check your blood glucose before meals and at bedtime.  It's important not to skip any meals.  Keep a log of your blood glucose results and always take it with you to your doctor appointments.  Keep a list of your current medications including injectables and over the counter medications and bring this medication list with you to all your doctor appointments.  If you have not seen your ophthalmologist this year call for appointment.  Check your feet daily for redness, sores, or openings. Do not self treat. If no improvement in two days call your primary care physician for an appointment.  Low blood sugar (hypoglycemia) is a blood sugar below 70mg/dl. Check your blood sugar if you feel signs/symptoms of hypoglycemia. If your blood sugar is below 70 take 15 grams of carbohydrates (ex 4 oz of apple juice, 3-4 glucose tablets, or 4-6 oz of regular soda) wait 15 minutes and repeat blood sugar to make sure it comes up above 70.  If your blood sugar is above 70 and you are due for a meal, have a meal.  If you are not due for a meal have a snack.  This snack helps keeps your blood sugar at a safe range.    Diagnosis: HTN (hypertension)  Assessment and Plan of Treatment: Low salt diet  Activity as tolerated.  Take all medication as prescribed.  Follow up with your medical doctor for routine blood pressure monitoring at your next visit.  Notify your doctor if you have any of the following symptoms:   Dizziness, Lightheadedness, Blurry vision, Headache, Chest pain, Shortness of breath    Diagnosis: ESRD on dialysis  Assessment and Plan of Treatment: Continue with your HD Avoid taking (NSAIDs) - (ex: Ibuprofen, Advil, Celebrex, Naprosyn)  Avoid taking any nephrotoxic agents (can harm kidneys) - Intravenous contrast for diagnostic testing, combination cold medications.  Have all medications adjusted for your renal function by your Health Care Provider.  Blood pressure control is important.  Take all medication as prescribed.

## 2019-08-19 NOTE — DISCHARGE NOTE PROVIDER - CARE PROVIDERS DIRECT ADDRESSES
,DirectAddress_Unknown ,DirectAddress_Unknown,[email or web address]

## 2019-08-19 NOTE — DISCHARGE NOTE NURSING/CASE MANAGEMENT/SOCIAL WORK - NSDCFUADDAPPT_GEN_ALL_CORE_FT
[facility] [place]: [address] [place] [address], [phone], 08/21/2019, 5:00 AM.

## 2019-08-20 VITALS
OXYGEN SATURATION: 98 % | RESPIRATION RATE: 17 BRPM | DIASTOLIC BLOOD PRESSURE: 77 MMHG | HEART RATE: 96 BPM | TEMPERATURE: 98 F | SYSTOLIC BLOOD PRESSURE: 157 MMHG

## 2019-08-20 LAB
ANION GAP SERPL CALC-SCNC: 11 MMOL/L — SIGNIFICANT CHANGE UP (ref 5–17)
BUN SERPL-MCNC: 67 MG/DL — HIGH (ref 7–18)
CALCIUM SERPL-MCNC: 8.9 MG/DL — SIGNIFICANT CHANGE UP (ref 8.4–10.5)
CHLORIDE SERPL-SCNC: 93 MMOL/L — LOW (ref 96–108)
CO2 SERPL-SCNC: 28 MMOL/L — SIGNIFICANT CHANGE UP (ref 22–31)
CREAT SERPL-MCNC: 10.3 MG/DL — HIGH (ref 0.5–1.3)
GLUCOSE BLDC GLUCOMTR-MCNC: 115 MG/DL — HIGH (ref 70–99)
GLUCOSE BLDC GLUCOMTR-MCNC: 132 MG/DL — HIGH (ref 70–99)
GLUCOSE BLDC GLUCOMTR-MCNC: 136 MG/DL — HIGH (ref 70–99)
GLUCOSE SERPL-MCNC: 122 MG/DL — HIGH (ref 70–99)
HCT VFR BLD CALC: 30.8 % — LOW (ref 39–50)
HGB BLD-MCNC: 9.2 G/DL — LOW (ref 13–17)
MCHC RBC-ENTMCNC: 27.6 PG — SIGNIFICANT CHANGE UP (ref 27–34)
MCHC RBC-ENTMCNC: 29.9 GM/DL — LOW (ref 32–36)
MCV RBC AUTO: 92.5 FL — SIGNIFICANT CHANGE UP (ref 80–100)
NRBC # BLD: 0 /100 WBCS — SIGNIFICANT CHANGE UP (ref 0–0)
PLATELET # BLD AUTO: 267 K/UL — SIGNIFICANT CHANGE UP (ref 150–400)
POTASSIUM SERPL-MCNC: 5.1 MMOL/L — SIGNIFICANT CHANGE UP (ref 3.5–5.3)
POTASSIUM SERPL-SCNC: 5.1 MMOL/L — SIGNIFICANT CHANGE UP (ref 3.5–5.3)
RBC # BLD: 3.33 M/UL — LOW (ref 4.2–5.8)
RBC # FLD: 17.8 % — HIGH (ref 10.3–14.5)
SODIUM SERPL-SCNC: 132 MMOL/L — LOW (ref 135–145)
WBC # BLD: 8.07 K/UL — SIGNIFICANT CHANGE UP (ref 3.8–10.5)
WBC # FLD AUTO: 8.07 K/UL — SIGNIFICANT CHANGE UP (ref 3.8–10.5)

## 2019-08-20 PROCEDURE — 80307 DRUG TEST PRSMV CHEM ANLYZR: CPT

## 2019-08-20 PROCEDURE — 82962 GLUCOSE BLOOD TEST: CPT

## 2019-08-20 PROCEDURE — 97161 PT EVAL LOW COMPLEX 20 MIN: CPT

## 2019-08-20 PROCEDURE — 36415 COLL VENOUS BLD VENIPUNCTURE: CPT

## 2019-08-20 PROCEDURE — 82607 VITAMIN B-12: CPT

## 2019-08-20 PROCEDURE — 80053 COMPREHEN METABOLIC PANEL: CPT

## 2019-08-20 PROCEDURE — 99261: CPT

## 2019-08-20 PROCEDURE — 82550 ASSAY OF CK (CPK): CPT

## 2019-08-20 PROCEDURE — 84484 ASSAY OF TROPONIN QUANT: CPT

## 2019-08-20 PROCEDURE — 87641 MR-STAPH DNA AMP PROBE: CPT

## 2019-08-20 PROCEDURE — 84443 ASSAY THYROID STIM HORMONE: CPT

## 2019-08-20 PROCEDURE — 83036 HEMOGLOBIN GLYCOSYLATED A1C: CPT

## 2019-08-20 PROCEDURE — 93005 ELECTROCARDIOGRAM TRACING: CPT

## 2019-08-20 PROCEDURE — 87640 STAPH A DNA AMP PROBE: CPT

## 2019-08-20 PROCEDURE — 99285 EMERGENCY DEPT VISIT HI MDM: CPT | Mod: 25

## 2019-08-20 PROCEDURE — 83735 ASSAY OF MAGNESIUM: CPT

## 2019-08-20 PROCEDURE — 97116 GAIT TRAINING THERAPY: CPT

## 2019-08-20 PROCEDURE — 85027 COMPLETE CBC AUTOMATED: CPT

## 2019-08-20 PROCEDURE — 82746 ASSAY OF FOLIC ACID SERUM: CPT

## 2019-08-20 PROCEDURE — 80061 LIPID PANEL: CPT

## 2019-08-20 PROCEDURE — 71045 X-RAY EXAM CHEST 1 VIEW: CPT

## 2019-08-20 PROCEDURE — 83880 ASSAY OF NATRIURETIC PEPTIDE: CPT

## 2019-08-20 PROCEDURE — 84100 ASSAY OF PHOSPHORUS: CPT

## 2019-08-20 PROCEDURE — 80048 BASIC METABOLIC PNL TOTAL CA: CPT

## 2019-08-20 PROCEDURE — 85610 PROTHROMBIN TIME: CPT

## 2019-08-20 PROCEDURE — 85730 THROMBOPLASTIN TIME PARTIAL: CPT

## 2019-08-20 PROCEDURE — 82553 CREATINE MB FRACTION: CPT

## 2019-08-20 RX ORDER — TRAMADOL HYDROCHLORIDE 50 MG/1
1 TABLET ORAL
Qty: 28 | Refills: 0
Start: 2019-08-20 | End: 2019-08-26

## 2019-08-20 RX ORDER — PANTOPRAZOLE SODIUM 20 MG/1
40 TABLET, DELAYED RELEASE ORAL
Refills: 0 | Status: DISCONTINUED | OUTPATIENT
Start: 2019-08-20 | End: 2019-08-20

## 2019-08-20 RX ORDER — CHLORHEXIDINE GLUCONATE 213 G/1000ML
1 SOLUTION TOPICAL EVERY 12 HOURS
Refills: 0 | Status: DISCONTINUED | OUTPATIENT
Start: 2019-08-20 | End: 2019-08-20

## 2019-08-20 RX ADMIN — Medication 50 MILLIGRAM(S): at 13:50

## 2019-08-20 RX ADMIN — Medication 10 MILLIGRAM(S): at 13:50

## 2019-08-20 RX ADMIN — Medication 25 MILLIGRAM(S): at 06:10

## 2019-08-20 RX ADMIN — CLOPIDOGREL BISULFATE 75 MILLIGRAM(S): 75 TABLET, FILM COATED ORAL at 13:50

## 2019-08-20 RX ADMIN — Medication 5 UNIT(S): at 07:41

## 2019-08-20 RX ADMIN — AMLODIPINE BESYLATE 5 MILLIGRAM(S): 2.5 TABLET ORAL at 06:10

## 2019-08-20 RX ADMIN — Medication 50 MILLIGRAM(S): at 06:10

## 2019-08-20 RX ADMIN — TRAMADOL HYDROCHLORIDE 50 MILLIGRAM(S): 50 TABLET ORAL at 15:00

## 2019-08-20 RX ADMIN — Medication 40 MILLIGRAM(S): at 06:10

## 2019-08-20 RX ADMIN — TRAMADOL HYDROCHLORIDE 50 MILLIGRAM(S): 50 TABLET ORAL at 13:52

## 2019-08-20 RX ADMIN — ERYTHROPOIETIN 4000 UNIT(S): 10000 INJECTION, SOLUTION INTRAVENOUS; SUBCUTANEOUS at 10:27

## 2019-08-20 NOTE — PROGRESS NOTE ADULT - SUBJECTIVE AND OBJECTIVE BOX
[facility] : Progress Note :: [phone], (office [phone]),   Dr La / Dr Hui / Dr Barber / Dr Villalobos / Dr Hang CASTELLANO / Dr Mehta / Dr Sanchez / Dr Alber dumont  _____________________________________________________________________________________________    seen on dialysis. stable hemodynamics    aspirin (Short breath)  aspirin (Swelling)  penicillin (Short breath)  penicillins (Swelling)  shellfish (Unknown)    Hospital Medications:   MEDICATIONS  (STANDING):  amLODIPine   Tablet 5 milliGRAM(s) Oral daily  chlorhexidine 2% Cloths 1 Application(s) Topical every 12 hours  clopidogrel Tablet 75 milliGRAM(s) Oral daily  epoetin cyndie Injectable 4000 Unit(s) IV Push <User Schedule>  furosemide   Injectable 40 milliGRAM(s) IV Push every 12 hours  gabapentin 300 milliGRAM(s) Oral at bedtime  heparin  Injectable 5000 Unit(s) SubCutaneous every 12 hours  hydrALAZINE 50 milliGRAM(s) Oral three times a day  insulin lispro (HumaLOG) corrective regimen sliding scale   SubCutaneous three times a day before meals  insulin lispro Injectable (HumaLOG) 5 Unit(s) SubCutaneous three times a day before meals  metoprolol tartrate 25 milliGRAM(s) Oral two times a day  mirtazapine 7.5 milliGRAM(s) Oral at bedtime  pantoprazole    Tablet 40 milliGRAM(s) Oral before breakfast  PARoxetine 10 milliGRAM(s) Oral daily  risperiDONE   Tablet 1 milliGRAM(s) Oral at bedtime  simvastatin 40 milliGRAM(s) Oral at bedtime  simvastatin 40 milliGRAM(s) Oral at bedtime        VITALS:  T(F): 98.2 (08-20-19 @ 08:27), Max: 98.4 (08-19-19 @ 20:29)  HR: 82 (08-20-19 @ 08:27)  BP: 128/72 (08-20-19 @ 08:27)  RR: 18 (08-20-19 @ 08:27)  SpO2: 98% (08-20-19 @ 06:11)  Wt(kg): --      PHYSICAL EXAM:  Constitutional: NAD  HEENT: anicteric sclera, oropharynx clear.  Neck: No JVD  Respiratory: CTAB, no wheezes, rales or rhonchi  Cardiovascular: S1, S2, RRR  Gastrointestinal: BS+, soft, NT/ND  Extremities: No peripheral edema  Neurological: A/O x 3, no focal deficitss  Vascular Access: AVF canulated    LABS:  08-20    132<L>  |  93<L>  |  67<H>  ----------------------------<  122<H>  5.1   |  28  |  10.30<H>    Ca    8.9      20 Aug 2019 09:31  Phos  7.4     08-19  Mg     2.6     08-19    TPro  7.5  /  Alb  3.0<L>  /  TBili  0.6  /  DBili      /  AST  15  /  ALT  22  /  AlkPhos  178<H>  08-19    Creatinine Trend: 10.30 <--, 8.93 <--, 6.29 <--, 6.95 <--, 5.41 <--                        9.2    8.07  )-----------( 267      ( 20 Aug 2019 09:31 )             30.8     Urine Studies:      RADIOLOGY & ADDITIONAL STUDIES:

## 2019-08-20 NOTE — PROGRESS NOTE ADULT - SUBJECTIVE AND OBJECTIVE BOX
HPI:  38 y/o male with PMHx of ESRD on MWF HD (via LUE AVF), DM (on insulin), cocaine abuse, on home O2 (4L NC), morbid obesity, Bipolar disorder, schizophrenia and HTN presents to the ED with chief complaint of shortness of breath x2 days and worsening LE edema. He reports being dialyzed for a full 5 hour session today and insists he is compliant with all medications. He complains of pain in both legs bilaterally over this time period as well. He denies chest pain, dizziness, nausea, vomiting or any other symptoms at this time.    Outpatient nephrologist: Dr. La  Outpatient dialysis center: [facility]  Last hemodialysis session: 8/16/19 for 5 hours  Dialysis access: LUE AVF (16 Aug 2019 21:17)      Patient is a 39y old  Male who presents with a chief complaint of sob (20 Aug 2019 12:15)      INTERVAL HPI/OVERNIGHT EVENTS:  T(C): 36.9 (08-20-19 @ 11:59), Max: 36.9 (08-19-19 @ 20:29)  HR: 84 (08-20-19 @ 11:59) (79 - 91)  BP: 154/85 (08-20-19 @ 11:59) (128/72 - 159/87)  RR: 18 (08-20-19 @ 11:59) (16 - 18)  SpO2: 98% (08-20-19 @ 06:11) (98% - 100%)  Wt(kg): --  I&O's Summary      REVIEW OF SYSTEMS: denies fever, chills, SOB, palpitations, chest pain, abdominal pain, nausea, vomitting, diarrhea, constipation, dizziness    MEDICATIONS  (STANDING):  amLODIPine   Tablet 5 milliGRAM(s) Oral daily  chlorhexidine 2% Cloths 1 Application(s) Topical every 12 hours  clopidogrel Tablet 75 milliGRAM(s) Oral daily  epoetin cyndie Injectable 4000 Unit(s) IV Push <User Schedule>  furosemide   Injectable 40 milliGRAM(s) IV Push every 12 hours  gabapentin 300 milliGRAM(s) Oral at bedtime  heparin  Injectable 5000 Unit(s) SubCutaneous every 12 hours  hydrALAZINE 50 milliGRAM(s) Oral three times a day  insulin lispro (HumaLOG) corrective regimen sliding scale   SubCutaneous three times a day before meals  insulin lispro Injectable (HumaLOG) 5 Unit(s) SubCutaneous three times a day before meals  metoprolol tartrate 25 milliGRAM(s) Oral two times a day  mirtazapine 7.5 milliGRAM(s) Oral at bedtime  pantoprazole    Tablet 40 milliGRAM(s) Oral before breakfast  PARoxetine 10 milliGRAM(s) Oral daily  risperiDONE   Tablet 1 milliGRAM(s) Oral at bedtime  simvastatin 40 milliGRAM(s) Oral at bedtime  simvastatin 40 milliGRAM(s) Oral at bedtime    MEDICATIONS  (PRN):  methocarbamol 500 milliGRAM(s) Oral every 12 hours PRN spasms  ondansetron Injectable 4 milliGRAM(s) IV Push every 6 hours PRN Nausea  traMADol 50 milliGRAM(s) Oral every 6 hours PRN Severe Pain (7 - 10)      PHYSICAL EXAM:  GENERAL: NAD, well-groomed, well-developed  HEAD:  Atraumatic, Normocephalic  EYES: EOMI, PERRLA, conjunctiva and sclera clear  ENMT: No tonsillar erythema, exudates, or enlargement; Moist mucous membranes, Good dentition, No lesions  NECK: Supple, No JVD, Normal thyroid  NERVOUS SYSTEM:  Alert & Oriented X3, Good concentration; Motor Strength 5/5 B/L upper and lower extremities; DTRs 2+ intact and symmetric  CHEST/LUNG: Clear to percussion bilaterally; No rales, rhonchi, wheezing, or rubs  HEART: Regular rate and rhythm; No murmurs, rubs, or gallops  ABDOMEN: Soft, Nontender, Nondistended; Bowel sounds present  EXTREMITIES:  2+ Peripheral Pulses, No clubbing, cyanosis, or edema  LYMPH: No lymphadenopathy noted  SKIN: No rashes or lesions  LABS:                        9.2    8.07  )-----------( 267      ( 20 Aug 2019 09:31 )             30.8     08-20    132<L>  |  93<L>  |  67<H>  ----------------------------<  122<H>  5.1   |  28  |  10.30<H>    Ca    8.9      20 Aug 2019 09:31  Phos  7.4     08-19  Mg     2.6     08-19    TPro  7.5  /  Alb  3.0<L>  /  TBili  0.6  /  DBili  x   /  AST  15  /  ALT  22  /  AlkPhos  178<H>  08-19        CAPILLARY BLOOD GLUCOSE      POCT Blood Glucose.: 136 mg/dL (20 Aug 2019 10:09)  POCT Blood Glucose.: 132 mg/dL (20 Aug 2019 07:30)  POCT Blood Glucose.: 99 mg/dL (19 Aug 2019 20:59)  POCT Blood Glucose.: 144 mg/dL (19 Aug 2019 16:24)

## 2019-08-20 NOTE — PROGRESS NOTE ADULT - ASSESSMENT
_________________________________________________________________________________________  ========>>  M E D I C A L   A T T E N D I N G  (covering today)   F O L L O W  U P  N O T E  <<=========  -----------------------------------------------------------------------------------------------------    - Patient seen and examined by me earlier today.   - In summary,  PRESTON JOHNSON is a 39y year old man who originally presented with SOb and edema   - Patient today overall doing ok, comfortable, eating OK.      pt c/o pain in legs Rt>Lt    ==================>> REVIEW OF SYSTEM <<=================    GEN: no fever, no chills, + pain as above   RESP: no SOB, no cough, no sputum  CVS: no chest pain, no palpitations, ++ edema  GI: no abdominal pain, no nausea  : no dysuria, + good urine output with diuretics   Neuro: no headache, no dizziness  Derm : no itching, no rash    ==================>> PHYSICAL EXAM <<=================    GEN: A&O X 3 , NAD , comfortable  HEENT: NCAT, PERRL, MMM, hearing intact  Neck: supple  CVS: S1S2 , regular , No M/R/G appreciated  PULM: CTA B/L,  no W/R/R appreciated  ABD.: soft. non tender, non distended,  bowel sounds present, obese   Extrem: intact pulses , + bilateral significant LE edema   PSYCH : normal mood,  not anxious      ==================>> MEDICATIONS <<====================    MEDICATIONS  (STANDING):  amLODIPine   Tablet 5 milliGRAM(s) Oral daily  clopidogrel Tablet 75 milliGRAM(s) Oral daily  furosemide   Injectable 40 milliGRAM(s) IV Push every 12 hours  gabapentin 300 milliGRAM(s) Oral at bedtime  heparin  Injectable 5000 Unit(s) SubCutaneous every 12 hours  hydrALAZINE 50 milliGRAM(s) Oral three times a day  insulin lispro (HumaLOG) corrective regimen sliding scale   SubCutaneous three times a day before meals  insulin lispro Injectable (HumaLOG) 5 Unit(s) SubCutaneous three times a day before meals  metoprolol tartrate 25 milliGRAM(s) Oral two times a day  mirtazapine 7.5 milliGRAM(s) Oral at bedtime  pantoprazole  Injectable 40 milliGRAM(s) IV Push daily  PARoxetine 10 milliGRAM(s) Oral daily  risperiDONE   Tablet 1 milliGRAM(s) Oral at bedtime  simvastatin 40 milliGRAM(s) Oral at bedtime  simvastatin 40 milliGRAM(s) Oral at bedtime    MEDICATIONS  (PRN):  ondansetron Injectable 4 milliGRAM(s) IV Push every 6 hours PRN Nausea  traMADol 50 milliGRAM(s) Oral every 6 hours PRN Severe Pain (7 - 10)    ==================>> VITAL SIGNS <<==================    T(C): 36.5 (08-18-19 @ 05:28), Max: 36.9 (08-17-19 @ 18:30)  HR: 89 (08-18-19 @ 05:28) (82 - 89)  BP: 136/72 (08-18-19 @ 05:28) (133/60 - 150/85)  RR: 16 (08-18-19 @ 05:28) (16 - 19)  SpO2: 100% (08-18-19 @ 05:28) (99% - 100%)    POCT Blood Glucose.: 138 mg/dL (18 Aug 2019 11:42)  POCT Blood Glucose.: 123 mg/dL (18 Aug 2019 07:41)  POCT Blood Glucose.: 180 mg/dL (17 Aug 2019 21:49)  POCT Blood Glucose.: 91 mg/dL (17 Aug 2019 19:04)  POCT Blood Glucose.: 118 mg/dL (17 Aug 2019 15:55)     ==================>> LAB AND IMAGING <<==================                        9.7    7.26  )-----------( 286      ( 18 Aug 2019 07:40 )             33.6        133<L>  |  94<L>  |  31<H>  ----------------------------<  115<H>  4.5   |  34<H>  |  6.29<H>    Ca    9.1      18 Aug 2019 07:40  Phos  6.1     08-18  Mg     2.4     08-18    TPro  7.7  /  Alb  3.1<L>  /  TBili  0.6  /  DBili  x   /  AST  18  /  ALT  24  /  AlkPhos  174<H>  08-18    PT/INR - ( 16 Aug 2019 17:32 )   PT: 12.4 sec;   INR: 1.11 ratio    PTT - ( 16 Aug 2019 17:32 )  PTT:33.7 sec               TSH:      7.11   (08-17-19)       ,     1.75   (07-08-19)           Lipid profile:  (08-17-19)     Total: 143     LDL  : 66     HDL  :51     TG   :130     HgA1C: 5.5  (08-17-19)        , 5.6  (07-09-19)        , 5.7  (05-13-19)            ___________________________________________________________________________________  ===============>>  A S S E S S M E N T   A N D   P L A N <<===============  ------------------------------------------------------------------------------------------    · Assessment		  40 y/o M with ESRD presents to the ED with shortness of breath x 2 days, likely from fluid overload in setting of ESRD. s/p urgent HD session on admission    Problem/Plan - 1:  ·  Problem: Shortness of breath and edema in pt with ESRD on dialysis   continue lasix and fluid removal via HD  O2 a needed   leg elevation and eventual ACE wrapping   daily weights/strict I/Os.     Problem/Plan - 2:  ·  Problem: ESRD on dialysis.  Plan: on MWF HD  additional HD per renal   nephro appreciated     Problem/Plan - 3:  ·  Problem: DM   pt takes novolog 15 units tid premeal  starting humalog 5 units tid with hss  monitor fs  titrate insulin up as needed    Problem/Plan - 4:  ·  Problem: HTN   c/w home med norvasc  c/w hydralazine  monitor vitals.   renal f/u and mgmt     Problem/Plan - 5:  ·  Problem: Bipolar disorder.  Plan: c/w remeron  c/w paxil  c/w risperidone qhs.     -GI/DVT Prophylaxis.    --------------------------------------------  Case discussed with pt  Education given on findings and plan of care  ___________________________  H. MIGUEL Jean Baptiste.     Pager: [phone] (covering today)
# ESRD. fluid overloaded. Seen on HD with UF goal of 4 kilos.  SOB has resolved.  #anemia of CKD. cont procrit on HD   # CKDMBD phos elevated. resume binders at discharge.  D/C planning
# ESRD. fluid overloaded. got extra HD Saturday  #plans for D/C in AM. will have him dialysed first thing in the morning.  #anemia of CKD. cont procrit on HD   # CKDMBD phos elevated. resume binders at discharge.
40 y/o male with PMHx of ESRD on MWF HD (via LUE AVF), DM (on insulin), cocaine abuse, on home O2 (4L NC), morbid obesity, Bipolar disorder, schizophrenia and HTN presents to the ED with chief complaint of shortness of breath x2 days and worsening LE edema. He reports being dialyzed for a full 5 hour session today and insists he is compliant with all medications. He complains of pain in both legs bilaterally over this time period as well. He denies chest pain, dizziness, nausea, vomiting or any other symptoms at this time.  pt was admitted for sob, swelling of both legs  no cp or palp  ex smoker  used cocain until recently non compliants to diet but states doesnt drink much liquids ,  had hd yesterday  again having today  BNP [id number]   trp neg  cxr ngt, ekg nsr  pt also has Sleep apnea syndrome  but doesnt use cpap  vsstable afebrile   no jvd, no bruite, lungs a/e fair , afe b/l a few rales basal crackles  a/p  fluids restriction  more fluids to be taken out
A/P  ESRD  ON HD  ,  WAS  DIALYZED  YESTERDAY,  -  EXTRA  SESSION,  FOR  REGULAR  HD  TOMORROW  WILL  DO  3-1/2 HRS  UF GOAL  4 LIT  -  BP  PERMITTING   ON  EPOGEN  WITH HD   ADV  PT  TO  LIMIT  IDWG  BP  IS WELL  CONTROLLED    LEG  PAIN-  NEUROPATHIC  ,  ADV TO   F/U  WITH HIS  NEUROLOGIST     D/C  PLANNING
Patient is a 39y old  Male who presents with a chief complaint of sob. Patient admitted for SOB likely from fluid overload
kenny nd examined vsstable afebrile physical unchanged  comfortable on edge of bed  no sob or cp  .  leg swelling much better.   hgb stable  out pt watch hgb on epogen  pt has water pitcher and glass of soda on table.  I again advised 1 liter fluid restriction  D/c planning after HD if pt is HD stable.  pcp, GI, pulm, use of bipap at home  wt reduction
seen and examined   vs stable  afebrile physical unchanged  no cp or sob or palp  doing ok  was supposed to be d/c to home   f/u with pcp, nephro, pulmonary for SAS  doesnt want to use bipap  risks explained   wt reduction  diet exercise  check bs

## 2019-08-21 LAB
MRSA PCR RESULT.: SIGNIFICANT CHANGE UP
S AUREUS DNA NOSE QL NAA+PROBE: SIGNIFICANT CHANGE UP

## 2019-08-22 NOTE — ED PROVIDER NOTE - CARE PLAN
08/22/2019  Tobias Celaya    Current provider:  Annamarie Soria MD      I, Tobias Celaya, rounded on Sherice Squires to ensure all mechanical assist device settings (IABP or VAD) were appropriate and all parameters were within limits.  I was able to ensure all back up equipment was present, the staff had no issues, and the Perfusion Department daily rounding was complete.    8:20 AM       Principal Discharge DX:	Asthma exacerbation

## 2019-09-06 ENCOUNTER — APPOINTMENT (OUTPATIENT)
Dept: VASCULAR SURGERY | Facility: CLINIC | Age: 39
End: 2019-09-06

## 2019-09-16 ENCOUNTER — APPOINTMENT (OUTPATIENT)
Dept: VASCULAR SURGERY | Facility: CLINIC | Age: 39
End: 2019-09-16
Payer: MEDICARE

## 2019-09-16 PROCEDURE — 93990 DOPPLER FLOW TESTING: CPT

## 2019-09-16 PROCEDURE — 99213 OFFICE O/P EST LOW 20 MIN: CPT

## 2019-09-17 ENCOUNTER — INPATIENT (INPATIENT)
Facility: HOSPITAL | Age: 39
LOS: 2 days | Discharge: HOME | DRG: 640 | End: 2019-09-20
Attending: INTERNAL MEDICINE | Admitting: INTERNAL MEDICINE
Payer: MEDICARE

## 2019-09-17 VITALS
HEIGHT: 78 IN | SYSTOLIC BLOOD PRESSURE: 154 MMHG | OXYGEN SATURATION: 95 % | RESPIRATION RATE: 20 BRPM | WEIGHT: 315 LBS | DIASTOLIC BLOOD PRESSURE: 97 MMHG | TEMPERATURE: 99 F | HEART RATE: 99 BPM

## 2019-09-17 DIAGNOSIS — Z98.890 OTHER SPECIFIED POSTPROCEDURAL STATES: Chronic | ICD-10-CM

## 2019-09-17 PROCEDURE — 71045 X-RAY EXAM CHEST 1 VIEW: CPT | Mod: 26

## 2019-09-17 RX ORDER — SODIUM CHLORIDE 9 MG/ML
3 INJECTION INTRAMUSCULAR; INTRAVENOUS; SUBCUTANEOUS ONCE
Refills: 0 | Status: COMPLETED | OUTPATIENT
Start: 2019-09-17 | End: 2019-09-17

## 2019-09-17 NOTE — PHYSICAL EXAM
[Thrill] : thrill [Pulsatile Thrill] : pulsatile thrill [Aneurysm] : aneurysm [Hand well perfused] : hand well perfused [2+] : left 2+ [Bleeding] : no bleeding [Normal] : normoactive bowel sounds, soft and nontender, no hepatosplenomegaly or masses appreciated [Ulcer] : no ulcer [de-identified] : intact

## 2019-09-17 NOTE — DISCUSSION/SUMMARY
[FreeTextEntry1] : 40 yo male with history of left upper extremity brachial cephalic avf presents for follow up.  pt with recurrent pain over the left upper extremity avf.\par duplex shows patent avf with flow noted outside the stent in the mid upper arm \par will arrange for left upper extremity fistulagram \par

## 2019-09-17 NOTE — ED ADULT NURSE NOTE - OBJECTIVE STATEMENT
Pt present to ED c/o pain to lower abdomen and SOB. Pt reports Oxygen dependent at 4L at home. Pt obese, sleepy but easily arousable to verbal stimuli, fully oriented to self place and time, breathing unlabored via 4LNC, non diaphoretic, abdomen rounded, +discoloration noted to lower abdomen.

## 2019-09-17 NOTE — HISTORY OF PRESENT ILLNESS
[FreeTextEntry1] : 38 yo male with history of left upper extremity brachial cephalic avf presents for follow up.  pt with recurrent pain over the left upper extremity avf.  pt states that the pain improved for a little while after the last avf.  pt reports bleeding for about 1 day after hd.  pt without any complaints at this time.

## 2019-09-17 NOTE — ED ADULT NURSE NOTE - NSIMPLEMENTINTERV_GEN_ALL_ED
Implemented All Fall Risk Interventions:  [place] to call system. Call bell, personal items and telephone within reach. Instruct patient to call for assistance. Room bathroom lighting operational. Non-slip footwear when patient is off stretcher. Physically safe environment: no spills, clutter or unnecessary equipment. Stretcher in lowest position, wheels locked, appropriate side rails in place. Provide visual cue, wrist band, yellow gown, etc. Monitor gait and stability. Monitor for mental status changes and reorient to person, place, and time. Review medications for side effects contributing to fall risk. Reinforce activity limits and safety measures with patient and family.

## 2019-09-18 DIAGNOSIS — R06.00 DYSPNEA, UNSPECIFIED: ICD-10-CM

## 2019-09-18 DIAGNOSIS — F20.9 SCHIZOPHRENIA, UNSPECIFIED: ICD-10-CM

## 2019-09-18 DIAGNOSIS — I10 ESSENTIAL (PRIMARY) HYPERTENSION: ICD-10-CM

## 2019-09-18 DIAGNOSIS — R10.9 UNSPECIFIED ABDOMINAL PAIN: ICD-10-CM

## 2019-09-18 DIAGNOSIS — E87.70 FLUID OVERLOAD, UNSPECIFIED: ICD-10-CM

## 2019-09-18 DIAGNOSIS — E11.9 TYPE 2 DIABETES MELLITUS WITHOUT COMPLICATIONS: ICD-10-CM

## 2019-09-18 DIAGNOSIS — N18.6 END STAGE RENAL DISEASE: ICD-10-CM

## 2019-09-18 DIAGNOSIS — E66.01 MORBID (SEVERE) OBESITY DUE TO EXCESS CALORIES: ICD-10-CM

## 2019-09-18 DIAGNOSIS — Z29.9 ENCOUNTER FOR PROPHYLACTIC MEASURES, UNSPECIFIED: ICD-10-CM

## 2019-09-18 LAB
ALBUMIN SERPL ELPH-MCNC: 3.3 G/DL — LOW (ref 3.5–5)
ALP SERPL-CCNC: 169 U/L — HIGH (ref 40–120)
ALT FLD-CCNC: 19 U/L DA — SIGNIFICANT CHANGE UP (ref 10–60)
ANION GAP SERPL CALC-SCNC: 5 MMOL/L — SIGNIFICANT CHANGE UP (ref 5–17)
APTT BLD: 35.1 SEC — SIGNIFICANT CHANGE UP (ref 27.5–36.3)
AST SERPL-CCNC: 18 U/L — SIGNIFICANT CHANGE UP (ref 10–40)
BASOPHILS # BLD AUTO: 0.03 K/UL — SIGNIFICANT CHANGE UP (ref 0–0.2)
BASOPHILS NFR BLD AUTO: 0.4 % — SIGNIFICANT CHANGE UP (ref 0–2)
BILIRUB SERPL-MCNC: 0.6 MG/DL — SIGNIFICANT CHANGE UP (ref 0.2–1.2)
BUN SERPL-MCNC: 52 MG/DL — HIGH (ref 7–18)
CALCIUM SERPL-MCNC: 9.6 MG/DL — SIGNIFICANT CHANGE UP (ref 8.4–10.5)
CHLORIDE SERPL-SCNC: 97 MMOL/L — SIGNIFICANT CHANGE UP (ref 96–108)
CO2 SERPL-SCNC: 33 MMOL/L — HIGH (ref 22–31)
CREAT SERPL-MCNC: 9.56 MG/DL — HIGH (ref 0.5–1.3)
D DIMER BLD IA.RAPID-MCNC: 2210 NG/ML DDU — HIGH
EOSINOPHIL # BLD AUTO: 0.54 K/UL — HIGH (ref 0–0.5)
EOSINOPHIL NFR BLD AUTO: 6.7 % — HIGH (ref 0–6)
GLUCOSE BLDC GLUCOMTR-MCNC: 90 MG/DL — SIGNIFICANT CHANGE UP (ref 70–99)
GLUCOSE BLDC GLUCOMTR-MCNC: 99 MG/DL — SIGNIFICANT CHANGE UP (ref 70–99)
GLUCOSE SERPL-MCNC: 113 MG/DL — HIGH (ref 70–99)
HCT VFR BLD CALC: 33.3 % — LOW (ref 39–50)
HGB BLD-MCNC: 9.7 G/DL — LOW (ref 13–17)
IMM GRANULOCYTES NFR BLD AUTO: 0.2 % — SIGNIFICANT CHANGE UP (ref 0–1.5)
INR BLD: 1.17 RATIO — HIGH (ref 0.88–1.16)
LIDOCAIN IGE QN: 237 U/L — SIGNIFICANT CHANGE UP (ref 73–393)
LYMPHOCYTES # BLD AUTO: 1.26 K/UL — SIGNIFICANT CHANGE UP (ref 1–3.3)
LYMPHOCYTES # BLD AUTO: 15.6 % — SIGNIFICANT CHANGE UP (ref 13–44)
MCHC RBC-ENTMCNC: 26.9 PG — LOW (ref 27–34)
MCHC RBC-ENTMCNC: 29.1 GM/DL — LOW (ref 32–36)
MCV RBC AUTO: 92.2 FL — SIGNIFICANT CHANGE UP (ref 80–100)
MONOCYTES # BLD AUTO: 1.19 K/UL — HIGH (ref 0–0.9)
MONOCYTES NFR BLD AUTO: 14.8 % — HIGH (ref 2–14)
NEUTROPHILS # BLD AUTO: 5.02 K/UL — SIGNIFICANT CHANGE UP (ref 1.8–7.4)
NEUTROPHILS NFR BLD AUTO: 62.3 % — SIGNIFICANT CHANGE UP (ref 43–77)
NRBC # BLD: 0 /100 WBCS — SIGNIFICANT CHANGE UP (ref 0–0)
PLATELET # BLD AUTO: 280 K/UL — SIGNIFICANT CHANGE UP (ref 150–400)
POTASSIUM SERPL-MCNC: 5.3 MMOL/L — SIGNIFICANT CHANGE UP (ref 3.5–5.3)
POTASSIUM SERPL-SCNC: 5.3 MMOL/L — SIGNIFICANT CHANGE UP (ref 3.5–5.3)
PROT SERPL-MCNC: 8.1 G/DL — SIGNIFICANT CHANGE UP (ref 6–8.3)
PROTHROM AB SERPL-ACNC: 13 SEC — HIGH (ref 10–12.9)
RBC # BLD: 3.61 M/UL — LOW (ref 4.2–5.8)
RBC # FLD: 18.2 % — HIGH (ref 10.3–14.5)
SODIUM SERPL-SCNC: 135 MMOL/L — SIGNIFICANT CHANGE UP (ref 135–145)
TROPONIN I SERPL-MCNC: 0.02 NG/ML — SIGNIFICANT CHANGE UP (ref 0–0.04)
WBC # BLD: 8.06 K/UL — SIGNIFICANT CHANGE UP (ref 3.8–10.5)
WBC # FLD AUTO: 8.06 K/UL — SIGNIFICANT CHANGE UP (ref 3.8–10.5)

## 2019-09-18 PROCEDURE — 99285 EMERGENCY DEPT VISIT HI MDM: CPT

## 2019-09-18 PROCEDURE — 74177 CT ABD & PELVIS W/CONTRAST: CPT | Mod: 26

## 2019-09-18 RX ORDER — AMLODIPINE BESYLATE 2.5 MG/1
5 TABLET ORAL DAILY
Refills: 0 | Status: DISCONTINUED | OUTPATIENT
Start: 2019-09-18 | End: 2019-09-20

## 2019-09-18 RX ORDER — HYDRALAZINE HCL 50 MG
50 TABLET ORAL THREE TIMES A DAY
Refills: 0 | Status: DISCONTINUED | OUTPATIENT
Start: 2019-09-18 | End: 2019-09-20

## 2019-09-18 RX ORDER — FERROUS SULFATE 325(65) MG
325 TABLET ORAL DAILY
Refills: 0 | Status: DISCONTINUED | OUTPATIENT
Start: 2019-09-18 | End: 2019-09-20

## 2019-09-18 RX ORDER — SEVELAMER CARBONATE 2400 MG/1
800 POWDER, FOR SUSPENSION ORAL
Refills: 0 | Status: DISCONTINUED | OUTPATIENT
Start: 2019-09-18 | End: 2019-09-20

## 2019-09-18 RX ORDER — ACETAMINOPHEN 500 MG
1000 TABLET ORAL ONCE
Refills: 0 | Status: COMPLETED | OUTPATIENT
Start: 2019-09-18 | End: 2019-09-18

## 2019-09-18 RX ORDER — INSULIN LISPRO 100/ML
VIAL (ML) SUBCUTANEOUS AT BEDTIME
Refills: 0 | Status: DISCONTINUED | OUTPATIENT
Start: 2019-09-18 | End: 2019-09-20

## 2019-09-18 RX ORDER — TRAMADOL HYDROCHLORIDE 50 MG/1
50 TABLET ORAL EVERY 6 HOURS
Refills: 0 | Status: DISCONTINUED | OUTPATIENT
Start: 2019-09-18 | End: 2019-09-20

## 2019-09-18 RX ORDER — MIRTAZAPINE 45 MG/1
7.5 TABLET, ORALLY DISINTEGRATING ORAL AT BEDTIME
Refills: 0 | Status: DISCONTINUED | OUTPATIENT
Start: 2019-09-18 | End: 2019-09-20

## 2019-09-18 RX ORDER — ONDANSETRON 8 MG/1
4 TABLET, FILM COATED ORAL ONCE
Refills: 0 | Status: COMPLETED | OUTPATIENT
Start: 2019-09-18 | End: 2019-09-18

## 2019-09-18 RX ORDER — FUROSEMIDE 40 MG
80 TABLET ORAL DAILY
Refills: 0 | Status: DISCONTINUED | OUTPATIENT
Start: 2019-09-18 | End: 2019-09-20

## 2019-09-18 RX ORDER — FOLIC ACID 0.8 MG
1 TABLET ORAL DAILY
Refills: 0 | Status: DISCONTINUED | OUTPATIENT
Start: 2019-09-18 | End: 2019-09-20

## 2019-09-18 RX ORDER — ERYTHROPOIETIN 10000 [IU]/ML
4000 INJECTION, SOLUTION INTRAVENOUS; SUBCUTANEOUS
Refills: 0 | Status: DISCONTINUED | OUTPATIENT
Start: 2019-09-18 | End: 2019-09-20

## 2019-09-18 RX ORDER — INSULIN LISPRO 100/ML
VIAL (ML) SUBCUTANEOUS
Refills: 0 | Status: DISCONTINUED | OUTPATIENT
Start: 2019-09-18 | End: 2019-09-20

## 2019-09-18 RX ORDER — SIMVASTATIN 20 MG/1
40 TABLET, FILM COATED ORAL AT BEDTIME
Refills: 0 | Status: DISCONTINUED | OUTPATIENT
Start: 2019-09-18 | End: 2019-09-20

## 2019-09-18 RX ORDER — PANTOPRAZOLE SODIUM 20 MG/1
40 TABLET, DELAYED RELEASE ORAL
Refills: 0 | Status: DISCONTINUED | OUTPATIENT
Start: 2019-09-18 | End: 2019-09-20

## 2019-09-18 RX ORDER — INSULIN GLARGINE 100 [IU]/ML
20 INJECTION, SOLUTION SUBCUTANEOUS AT BEDTIME
Refills: 0 | Status: DISCONTINUED | OUTPATIENT
Start: 2019-09-18 | End: 2019-09-20

## 2019-09-18 RX ORDER — CLOPIDOGREL BISULFATE 75 MG/1
75 TABLET, FILM COATED ORAL DAILY
Refills: 0 | Status: DISCONTINUED | OUTPATIENT
Start: 2019-09-18 | End: 2019-09-20

## 2019-09-18 RX ORDER — CHLORHEXIDINE GLUCONATE 213 G/1000ML
1 SOLUTION TOPICAL DAILY
Refills: 0 | Status: DISCONTINUED | OUTPATIENT
Start: 2019-09-18 | End: 2019-09-20

## 2019-09-18 RX ORDER — METHOCARBAMOL 500 MG/1
500 TABLET, FILM COATED ORAL EVERY 12 HOURS
Refills: 0 | Status: DISCONTINUED | OUTPATIENT
Start: 2019-09-18 | End: 2019-09-20

## 2019-09-18 RX ORDER — GABAPENTIN 400 MG/1
300 CAPSULE ORAL AT BEDTIME
Refills: 0 | Status: DISCONTINUED | OUTPATIENT
Start: 2019-09-18 | End: 2019-09-20

## 2019-09-18 RX ORDER — HEPARIN SODIUM 5000 [USP'U]/ML
5000 INJECTION INTRAVENOUS; SUBCUTANEOUS EVERY 12 HOURS
Refills: 0 | Status: DISCONTINUED | OUTPATIENT
Start: 2019-09-18 | End: 2019-09-20

## 2019-09-18 RX ORDER — RISPERIDONE 4 MG/1
1 TABLET ORAL AT BEDTIME
Refills: 0 | Status: DISCONTINUED | OUTPATIENT
Start: 2019-09-18 | End: 2019-09-20

## 2019-09-18 RX ORDER — ASCORBIC ACID 60 MG
500 TABLET,CHEWABLE ORAL DAILY
Refills: 0 | Status: DISCONTINUED | OUTPATIENT
Start: 2019-09-18 | End: 2019-09-20

## 2019-09-18 RX ORDER — METOPROLOL TARTRATE 50 MG
25 TABLET ORAL
Refills: 0 | Status: DISCONTINUED | OUTPATIENT
Start: 2019-09-18 | End: 2019-09-20

## 2019-09-18 RX ORDER — MORPHINE SULFATE 50 MG/1
6 CAPSULE, EXTENDED RELEASE ORAL ONCE
Refills: 0 | Status: DISCONTINUED | OUTPATIENT
Start: 2019-09-18 | End: 2019-09-18

## 2019-09-18 RX ORDER — ACETAMINOPHEN 500 MG
650 TABLET ORAL EVERY 6 HOURS
Refills: 0 | Status: DISCONTINUED | OUTPATIENT
Start: 2019-09-18 | End: 2019-09-20

## 2019-09-18 RX ADMIN — TRAMADOL HYDROCHLORIDE 50 MILLIGRAM(S): 50 TABLET ORAL at 17:41

## 2019-09-18 RX ADMIN — ONDANSETRON 4 MILLIGRAM(S): 8 TABLET, FILM COATED ORAL at 21:42

## 2019-09-18 RX ADMIN — MIRTAZAPINE 7.5 MILLIGRAM(S): 45 TABLET, ORALLY DISINTEGRATING ORAL at 22:38

## 2019-09-18 RX ADMIN — Medication 25 MILLIGRAM(S): at 18:14

## 2019-09-18 RX ADMIN — TRAMADOL HYDROCHLORIDE 50 MILLIGRAM(S): 50 TABLET ORAL at 18:32

## 2019-09-18 RX ADMIN — SEVELAMER CARBONATE 800 MILLIGRAM(S): 2400 POWDER, FOR SUSPENSION ORAL at 17:41

## 2019-09-18 RX ADMIN — MORPHINE SULFATE 6 MILLIGRAM(S): 50 CAPSULE, EXTENDED RELEASE ORAL at 02:57

## 2019-09-18 RX ADMIN — Medication 50 MILLIGRAM(S): at 22:38

## 2019-09-18 RX ADMIN — ERYTHROPOIETIN 4000 UNIT(S): 10000 INJECTION, SOLUTION INTRAVENOUS; SUBCUTANEOUS at 11:34

## 2019-09-18 RX ADMIN — INSULIN GLARGINE 20 UNIT(S): 100 INJECTION, SOLUTION SUBCUTANEOUS at 22:38

## 2019-09-18 RX ADMIN — SODIUM CHLORIDE 3 MILLILITER(S): 9 INJECTION INTRAMUSCULAR; INTRAVENOUS; SUBCUTANEOUS at 00:58

## 2019-09-18 RX ADMIN — SIMVASTATIN 40 MILLIGRAM(S): 20 TABLET, FILM COATED ORAL at 22:38

## 2019-09-18 RX ADMIN — RISPERIDONE 1 MILLIGRAM(S): 4 TABLET ORAL at 22:38

## 2019-09-18 RX ADMIN — MORPHINE SULFATE 6 MILLIGRAM(S): 50 CAPSULE, EXTENDED RELEASE ORAL at 03:00

## 2019-09-18 RX ADMIN — Medication 1000 MILLIGRAM(S): at 09:45

## 2019-09-18 RX ADMIN — HEPARIN SODIUM 5000 UNIT(S): 5000 INJECTION INTRAVENOUS; SUBCUTANEOUS at 18:14

## 2019-09-18 RX ADMIN — Medication 400 MILLIGRAM(S): at 09:19

## 2019-09-18 RX ADMIN — ONDANSETRON 4 MILLIGRAM(S): 8 TABLET, FILM COATED ORAL at 05:48

## 2019-09-18 NOTE — PROGRESS NOTE ADULT - SUBJECTIVE AND OBJECTIVE BOX
HPI:  39 years old male from home with PMHx of ESRD on MWF HD (via LUE AVF), DM (on insulin), cocaine abuse, on home O2 (4L NC), morbid obesity, Bipolar disorder, schizophrenia and HTN presents to the ED with chief complaint of shortness of breath x2 days and worsening LE edema likely from fluid overload in setting of ESRD. per patient he completed HD on 09/16/19. Patient is also complaining of abdominal pain in umbilical region. Which is non-radiating and intermittent in nature. Patient has multiple recent addmisson in last few months for fluid overload needing urgent dialysis.   Pt denies any chest pain, palpitations, nausea, vomiting, change in bowel or urinary habits (18 Sep 2019 09:36)      Patient is a 39y old  Male who presents with a chief complaint of shortness of breath and abdominal pain. (18 Sep 2019 14:57)      INTERVAL HPI/OVERNIGHT EVENTS:  T(C): 36.3 (09-18-19 @ 14:15), Max: 37.2 (09-17-19 @ 23:04)  HR: 79 (09-18-19 @ 14:15) (74 - 99)  BP: 154/97 (09-18-19 @ 14:15) (139/82 - 162/89)  RR: 20 (09-18-19 @ 14:15) (18 - 20)  SpO2: 100% (09-18-19 @ 07:40) (95% - 100%)  Wt(kg): --  I&O's Summary      REVIEW OF SYSTEMS: denies fever, chills, SOB, palpitations, chest pain, abdominal pain, nausea, vomitting, diarrhea, constipation, dizziness    MEDICATIONS  (STANDING):  amLODIPine   Tablet 5 milliGRAM(s) Oral daily  ascorbic acid 500 milliGRAM(s) Oral daily  chlorhexidine 2% Cloths 1 Application(s) Topical daily  clopidogrel Tablet 75 milliGRAM(s) Oral daily  epoetin cyndie Injectable 4000 Unit(s) IV Push <User Schedule>  ferrous    sulfate 325 milliGRAM(s) Oral daily  folic acid 1 milliGRAM(s) Oral daily  furosemide    Tablet 80 milliGRAM(s) Oral daily  gabapentin 300 milliGRAM(s) Oral at bedtime  heparin  Injectable 5000 Unit(s) SubCutaneous every 12 hours  hydrALAZINE 50 milliGRAM(s) Oral three times a day  insulin glargine Injectable (LANTUS) 20 Unit(s) SubCutaneous at bedtime  insulin lispro (HumaLOG) corrective regimen sliding scale   SubCutaneous three times a day before meals  insulin lispro (HumaLOG) corrective regimen sliding scale   SubCutaneous at bedtime  metoprolol tartrate 25 milliGRAM(s) Oral two times a day  mirtazapine 7.5 milliGRAM(s) Oral at bedtime  pantoprazole    Tablet 40 milliGRAM(s) Oral before breakfast  PARoxetine 10 milliGRAM(s) Oral daily  risperiDONE   Tablet 1 milliGRAM(s) Oral at bedtime  sevelamer carbonate 800 milliGRAM(s) Oral three times a day with meals  simvastatin 40 milliGRAM(s) Oral at bedtime    MEDICATIONS  (PRN):  methocarbamol 500 milliGRAM(s) Oral every 12 hours PRN spasms  traMADol 50 milliGRAM(s) Oral every 6 hours PRN Severe Pain (7 - 10)      PHYSICAL EXAM:  GENERAL: NAD, well-groomed, well-developed  HEAD:  Atraumatic, Normocephalic  EYES: EOMI, PERRLA, conjunctiva and sclera clear  ENMT: No tonsillar erythema, exudates, or enlargement; Moist mucous membranes, Good dentition, No lesions  NECK: Supple, No JVD, Normal thyroid  NERVOUS SYSTEM:  Alert & Oriented X3, Good concentration; Motor Strength 5/5 B/L upper and lower extremities; DTRs 2+ intact and symmetric  CHEST/LUNG: Clear to percussion bilaterally; No rales, rhonchi, wheezing, or rubs  HEART: Regular rate and rhythm; No murmurs, rubs, or gallops  ABDOMEN: Soft, Nontender, Nondistended; Bowel sounds present  EXTREMITIES:  2+ Peripheral Pulses, No clubbing, cyanosis, or edema  LYMPH: No lymphadenopathy noted  SKIN: No rashes or lesions  LABS:                        9.7    8.06  )-----------( 280      ( 18 Sep 2019 01:46 )             33.3     09-18    135  |  97  |  52<H>  ----------------------------<  113<H>  5.3   |  33<H>  |  9.56<H>    Ca    9.6      18 Sep 2019 01:46    TPro  8.1  /  Alb  3.3<L>  /  TBili  0.6  /  DBili  x   /  AST  18  /  ALT  19  /  AlkPhos  169<H>  09-18    PT/INR - ( 18 Sep 2019 01:46 )   PT: 13.0 sec;   INR: 1.17 ratio         PTT - ( 18 Sep 2019 01:46 )  PTT:35.1 sec    CAPILLARY BLOOD GLUCOSE

## 2019-09-18 NOTE — CONSULT NOTE ADULT - SUBJECTIVE AND OBJECTIVE BOX
[facility] : Progress Note :: [phone], (office [phone]),   Dr La / Dr Hui / Dr Barber / Dr Villalobos / Dr Hang CASTELLANO / Dr Mehta / Dr Sanchez / Dr Alber dumont  _____________________________________________________________________________________________  Patient is a 39y Male with ESRD on HD Tuesday, Thursday and Friday at [facility]. Presented to ED overnight with SOB and abdominal pain. CT abdomen was unremarkable for any obstruction. CXR revealed pulmonary edema. Renal consulted overnight for emergent HD. S/p HD earlier with ultrafiltration of 3.5 kilos. feels much better. still complaining of joshua-umbilical pain    PAST MEDICAL & SURGICAL HISTORY:  Migraine  HTN (hypertension)  DM (diabetes mellitus)  Bipolar disorder  Schizophrenia  ESRD on dialysis  Morbid obesity with BMI of 40.0-44.9, adult  H/O hernia repair    aspirin (Short breath)  aspirin (Swelling)  penicillin (Short breath)  penicillins (Swelling)  shellfish (Unknown)    Home Medications Reviewed  Hospital Medications:   MEDICATIONS  (STANDING):  amLODIPine   Tablet 5 milliGRAM(s) Oral daily  ascorbic acid 500 milliGRAM(s) Oral daily  chlorhexidine 2% Cloths 1 Application(s) Topical daily  clopidogrel Tablet 75 milliGRAM(s) Oral daily  epoetin cyndie Injectable 4000 Unit(s) IV Push <User Schedule>  ferrous    sulfate 325 milliGRAM(s) Oral daily  folic acid 1 milliGRAM(s) Oral daily  furosemide    Tablet 80 milliGRAM(s) Oral daily  gabapentin 300 milliGRAM(s) Oral at bedtime  heparin  Injectable 5000 Unit(s) SubCutaneous every 12 hours  hydrALAZINE 50 milliGRAM(s) Oral three times a day  insulin glargine Injectable (LANTUS) 20 Unit(s) SubCutaneous at bedtime  insulin lispro (HumaLOG) corrective regimen sliding scale   SubCutaneous three times a day before meals  insulin lispro (HumaLOG) corrective regimen sliding scale   SubCutaneous at bedtime  metoprolol tartrate 25 milliGRAM(s) Oral two times a day  mirtazapine 7.5 milliGRAM(s) Oral at bedtime  pantoprazole    Tablet 40 milliGRAM(s) Oral before breakfast  PARoxetine 10 milliGRAM(s) Oral daily  risperiDONE   Tablet 1 milliGRAM(s) Oral at bedtime  sevelamer carbonate 800 milliGRAM(s) Oral three times a day with meals  simvastatin 40 milliGRAM(s) Oral at bedtime    SOCIAL HISTORY:  Denies ETOh,Smoking,   FAMILY HISTORY:  Family history of COPD (chronic obstructive pulmonary disease)  Family history of diabetes mellitus    VITALS:  T(F): 97.4 (09-18-19 @ 10:45), Max: 99 (09-17-19 @ 23:04)  HR: 74 (09-18-19 @ 10:45)  BP: 162/89 (09-18-19 @ 10:45)  RR: 18 (09-18-19 @ 10:45)  SpO2: 100% (09-18-19 @ 07:40)  Wt(kg): --    Height (cm): 200.66 (09-17 @ 23:04)  Weight (kg): 202.3 (09-17 @ 23:04)  BMI (kg/m2): 50.2 (09-17 @ 23:04)  BSA (m2): 3.2 (09-17 @ 23:04)    PHYSICAL EXAM:  Constitutional: NAD  HEENT: anicteric sclera, oropharynx clear.  Neck: No JVD  Respiratory: CTAB, no wheezes, rales or rhonchi  Cardiovascular: S1, S2, RRR  Gastrointestinal: BS+, soft,. joshua-umbilical tenderness  Extremities:  peripheral edema++  Neurological: A/O x 3, no focal deficits  : No CVA tenderness. No hernandez.   Skin: chronic stasis changes  Vascular Access:    LABS:  09-18    135  |  97  |  52<H>  ----------------------------<  113<H>  5.3   |  33<H>  |  9.56<H>    Ca    9.6      18 Sep 2019 01:46    TPro  8.1  /  Alb  3.3<L>  /  TBili  0.6  /  DBili      /  AST  18  /  ALT  19  /  AlkPhos  169<H>  09-18    Creatinine Trend: 9.56 <--                        9.7    8.06  )-----------( 280      ( 18 Sep 2019 01:46 )             33.3     Urine Studies:      RADIOLOGY & ADDITIONAL STUDIES:

## 2019-09-18 NOTE — PROGRESS NOTE ADULT - ASSESSMENT
pt was brought in sec to sob and abd pain x2 days   pt HD on monday as per him was ok  from yesterday started having sob m no cp, palp came to er today early am  also c/o ABD PAIN ON MID AND LOWER SIDE  WITHOUT ANY nausea or vomiting  bm ok  mark has hernia  abd has mild yenderness  bs nml  vs stable afebrile physical unchanged  mild abd tender  non etoh  labs noted  trp minimal high, bnp over 4000  cxr congestion  ct abd ascites  had echo in 4/19  nml systolic dysf  will get GI  again d/w pt to drink 1000cc/24  less salt

## 2019-09-18 NOTE — PROGRESS NOTE ADULT - SUBJECTIVE AND OBJECTIVE BOX
HPI:  39 years old male from home with PMHx of ESRD on MWF HD (via LUE AVF), DM (on insulin), cocaine abuse, on home O2 (4L NC), morbid obesity, Bipolar disorder, schizophrenia and HTN presents to the ED with chief complaint of shortness of breath x2 days and worsening LE edema likely from fluid overload in setting of ESRD. per patient he completed HD on 09/16/19. Patient is also complaining of abdominal pain in umbilical region. Which is non-radiating and intermittent in nature. Patient has multiple recent addmisson in last few months for fluid overload needing urgent dialysis.   Pt denies any chest pain, palpitations, nausea, vomiting (18 Sep 2019 09:36)      Patient is a 39y old  Male who presents with a chief complaint of shortness of breath and abdominal pain. (18 Sep 2019 09:36)      INTERVAL HPI/OVERNIGHT EVENTS:  T(C): 36.3 (09-18-19 @ 10:45), Max: 37.2 (09-17-19 @ 23:04)  HR: 74 (09-18-19 @ 10:45) (74 - 99)  BP: 162/89 (09-18-19 @ 10:45) (139/82 - 162/89)  RR: 18 (09-18-19 @ 10:45) (18 - 20)  SpO2: 100% (09-18-19 @ 07:40) (95% - 100%)  Wt(kg): --  I&O's Summary      REVIEW OF SYSTEMS: denies fever, chills, SOB, palpitations, chest pain, abdominal pain, nausea, vomitting, diarrhea, constipation, dizziness    MEDICATIONS  (STANDING):  amLODIPine   Tablet 5 milliGRAM(s) Oral daily  ascorbic acid 500 milliGRAM(s) Oral daily  chlorhexidine 2% Cloths 1 Application(s) Topical daily  clopidogrel Tablet 75 milliGRAM(s) Oral daily  epoetin cyndie Injectable 4000 Unit(s) IV Push <User Schedule>  ferrous    sulfate 325 milliGRAM(s) Oral daily  folic acid 1 milliGRAM(s) Oral daily  furosemide    Tablet 80 milliGRAM(s) Oral daily  gabapentin 300 milliGRAM(s) Oral at bedtime  heparin  Injectable 5000 Unit(s) SubCutaneous every 12 hours  hydrALAZINE 50 milliGRAM(s) Oral three times a day  insulin glargine Injectable (LANTUS) 20 Unit(s) SubCutaneous at bedtime  insulin lispro (HumaLOG) corrective regimen sliding scale   SubCutaneous three times a day before meals  insulin lispro (HumaLOG) corrective regimen sliding scale   SubCutaneous at bedtime  metoprolol tartrate 25 milliGRAM(s) Oral two times a day  mirtazapine 7.5 milliGRAM(s) Oral at bedtime  pantoprazole    Tablet 40 milliGRAM(s) Oral before breakfast  PARoxetine 10 milliGRAM(s) Oral daily  risperiDONE   Tablet 1 milliGRAM(s) Oral at bedtime  sevelamer carbonate 800 milliGRAM(s) Oral three times a day with meals  simvastatin 40 milliGRAM(s) Oral at bedtime    MEDICATIONS  (PRN):  methocarbamol 500 milliGRAM(s) Oral every 12 hours PRN spasms  traMADol 50 milliGRAM(s) Oral every 6 hours PRN Severe Pain (7 - 10)      PHYSICAL EXAM:  GENERAL: NAD, well-groomed, well-developed  HEAD:  Atraumatic, Normocephalic  EYES: EOMI, PERRLA, conjunctiva and sclera clear  ENMT: No tonsillar erythema, exudates, or enlargement; Moist mucous membranes, Good dentition, No lesions  NECK: Supple, No JVD, Normal thyroid  NERVOUS SYSTEM:  Alert & Oriented X3, Good concentration; Motor Strength 5/5 B/L upper and lower extremities; DTRs 2+ intact and symmetric  CHEST/LUNG: Clear to percussion bilaterally; No rales, rhonchi, wheezing, or rubs  HEART: Regular rate and rhythm; No murmurs, rubs, or gallops  ABDOMEN: Soft, Nontender, Nondistended; Bowel sounds present  EXTREMITIES:  2+ Peripheral Pulses, No clubbing, cyanosis, or edema  LYMPH: No lymphadenopathy noted  SKIN: No rashes or lesions  LABS:                        9.7    8.06  )-----------( 280      ( 18 Sep 2019 01:46 )             33.3     09-18    135  |  97  |  52<H>  ----------------------------<  113<H>  5.3   |  33<H>  |  9.56<H>    Ca    9.6      18 Sep 2019 01:46    TPro  8.1  /  Alb  3.3<L>  /  TBili  0.6  /  DBili  x   /  AST  18  /  ALT  19  /  AlkPhos  169<H>  09-18    PT/INR - ( 18 Sep 2019 01:46 )   PT: 13.0 sec;   INR: 1.17 ratio         PTT - ( 18 Sep 2019 01:46 )  PTT:35.1 sec    CAPILLARY BLOOD GLUCOSE

## 2019-09-18 NOTE — H&P ADULT - ASSESSMENT
39 years old male from home with PMHx of ESRD on MWF HD (via LUE AVF), DM (on insulin), cocaine abuse, on home O2 (4L NC), morbid obesity, Bipolar disorder, schizophrenia and HTN presents to the ED with chief complaint of shortness of breath x2 days and worsening LE edema likely from fluid overload in setting of ESRD. per patient he completed HD on 09/16/19. Patient is also complaining of abdominal pain in umbilical region. Which is non-radiating and intermittent in nature. Patient has multiple recent addmisson in last few months for fluid overload needing urgent dialysis.   Pt denies any chest pain, palpitations, nausea, vomiting, change in bowel or urinary habits.  patient is admitted with fluid overload needing urgent HD.

## 2019-09-18 NOTE — ED PROVIDER NOTE - CARE PLAN
Principal Discharge DX:	Dyspnea  Secondary Diagnosis:	Abdominal pain  Secondary Diagnosis:	ESRD on dialysis

## 2019-09-18 NOTE — H&P ADULT - PROBLEM SELECTOR PLAN 5
pt has history of HTN.  Takes hydralazine, amlodpine, metoprolol and lasix at home.  will resume with paramerters.  DASHTLC diet  monitor blood pressure

## 2019-09-18 NOTE — CONSULT NOTE ADULT - ASSESSMENT
Patient is a 39y Male with ESRD on HD Tuesday, Thursday and Friday at [facility]. Presented to ED overnight with SOB and abdominal pain. CT abdomen was unremarkable for any obstruction. CXR revealed pulmonary edema. Renal consulted overnight for emergent HD. S/p HD earlier with ultrafiltration of 3.5 kilos. feels much better. still complaining of joshua-umbilical pain.    # ESRD presented with pulmonary edema. s/p HD with improvement of respiratory status. will consider repeat HD in AM for ultrafiltration  cont lasix  # anemia of CKD. cont procrit  # abdominal pain. CT noted to be negative  # HTN controlled.  # CKDMBD. cont sevelamer

## 2019-09-18 NOTE — H&P ADULT - PROBLEM SELECTOR PLAN 3
pt was complaining of adbominal pain in umblical region.  Slight tenderness on exam. Patient has hernia there.  CT abdomen is negative for any obstructive component.  Reported pain is out of proportion to physical exam finding.  ordered IV tylenol and tramadol.  pain management consult because of suspected opiod abuse.  COmplex care patient.   social work consulted

## 2019-09-18 NOTE — H&P ADULT - PROBLEM SELECTOR PLAN 2
patient normal dialysis days are MWF.  Looks to be in fluid overload.  Per patient last HD was on 09/16/2019.  Urgent HD planned.  Patient is undergoing HD currently.  Nephro consult dr La.

## 2019-09-18 NOTE — H&P ADULT - NSHPPHYSICALEXAM_GEN_ALL_CORE
Vital Signs Last 24 Hrs  T(C): 37 (18 Sep 2019 07:40), Max: 37.2 (17 Sep 2019 23:04)  T(F): 98.6 (18 Sep 2019 07:40), Max: 99 (17 Sep 2019 23:04)  HR: 80 (18 Sep 2019 07:40) (80 - 99)  BP: 139/82 (18 Sep 2019 07:40) (139/82 - 154/97)  BP(mean): --  RR: 18 (18 Sep 2019 07:40) (18 - 20)  SpO2: 100% (18 Sep 2019 07:40) (95% - 100%) Vital Signs Last 24 Hrs  T(C): 37 (18 Sep 2019 07:40), Max: 37.2 (17 Sep 2019 23:04)  T(F): 98.6 (18 Sep 2019 07:40), Max: 99 (17 Sep 2019 23:04)  HR: 80 (18 Sep 2019 07:40) (80 - 99)  BP: 139/82 (18 Sep 2019 07:40) (139/82 - 154/97)  BP(mean): --  RR: 18 (18 Sep 2019 07:40) (18 - 20)  SpO2: 100% (18 Sep 2019 07:40) (95% - 100%)  · CONSTITUTIONAL: Well appearing, well nourished, awake, alert, oriented to person, place, time/situation and in no apparent distress.  · ENMT: Airway patent, Nasal mucosa clear. Mouth with normal mucosa. Throat has no vesicles, no oropharyngeal exudates and uvula is midline.  · EYES: Clear bilaterally, pupils equal, round and reactive to light.  · CARDIAC: Normal rate, regular rhythm.  Heart sounds S1, S2.  No murmurs, rubs or gallops.  · RESPIRATORY: - - -  · Respiratory Distress: no   · Breath Sounds: RALES  · Rales: DIFFUSE  · GASTROINTESTINAL: - - -  · Abdominal Exam: soft  · Abdominal Tenderness Location: periumbilical  · MUSCULOSKELETAL: Spine appears normal, range of motion is not limited, no muscle or joint tenderness  · NEUROLOGICAL: Alert and oriented, no focal deficits, no motor or sensory deficits.  · SKIN: Skin normal color for race, warm, dry and intact. No evidence of rash.  · PSYCHIATRIC: Alert and oriented to person, place, time/situation. normal mood and affect. no apparent risk to self or others.  · HEME LYMPH: No adenopathy or splenomegaly. No cervical or inguinal lymphadenopathy.

## 2019-09-18 NOTE — H&P ADULT - PROBLEM SELECTOR PLAN 8
IMPROVE VTE Individual Risk Assessment  RISK                                                          Points  [] Previous VTE                                           3  [] Thrombophilia                                        2  [] Lower limb paralysis                              2   [] Current Cancer                                       2   [] Immobilization > 24 hrs                        1  [] ICU/CCU stay > 24 hours                       1  [] Age > 60                                                   1  IMPROVE VTE Score = 2  LOVENOX 40 mg for DVT chemoprophylaxis

## 2019-09-18 NOTE — H&P ADULT - PROBLEM SELECTOR PLAN 4
pt has history of DM,om novolog at home.  started on insulin HSS and lantus 20 unit  up titrate as needed.  f/u hba1c

## 2019-09-18 NOTE — ED PROVIDER NOTE - OBJECTIVE STATEMENT
Chief complaint of progressive shortness of breath. Pt states received HD on 9/16/19. No chest pain, no fever. Pt also complaitnes of diffuse ventral abdominal pain. No vomiting.   Case dw nephrologist Dr. Sebastian pt can receive IV contrast for CT abd/pelvis and will arrange HD in am. Chief complaint of progressive shortness of breath. Pt states received HD on 9/16/19. No chest pain, no fever. Pt also complaints of diffuse ventral abdominal pain. No vomiting.   Case dw nephrologist Dr. Sebastian pt can receive IV contrast for CT abd/pelvis and will arrange HD in am.

## 2019-09-18 NOTE — H&P ADULT - PROBLEM SELECTOR PLAN 1
pt presented with Shortness of breath. Patient is on home oxygen.  Looks to be in fluid overload.  Per patient last HD was on 09/16/2019.  Urgent HD planned.  Patient is undergoing HD currently.  Nephro consult dr La.

## 2019-09-18 NOTE — H&P ADULT - HISTORY OF PRESENT ILLNESS
39 years old male from home with PMHx of ESRD on MWF HD (via LUE AVF), DM (on insulin), cocaine abuse, on home O2 (4L NC), morbid obesity, Bipolar disorder, schizophrenia and HTN presents to the ED with chief complaint of shortness of breath x2 days and worsening LE edema likely from fluid overload in setting of ESRD. per patient he completed HD on 09/16/19. Patient is also complaining of abdominal pain in umbilical region. Which is non-radiating and intermittent in nature. Patient has multiple recent addmisson in last few months for fluid overload needing urgent dialysis.   Pt denies any chest pain, palpitations, nausea, vomiting 39 years old male from home with PMHx of ESRD on MWF HD (via LUE AVF), DM (on insulin), cocaine abuse, on home O2 (4L NC), morbid obesity, Bipolar disorder, schizophrenia and HTN presents to the ED with chief complaint of shortness of breath x2 days and worsening LE edema likely from fluid overload in setting of ESRD. per patient he completed HD on 09/16/19. Patient is also complaining of abdominal pain in umbilical region. Which is non-radiating and intermittent in nature. Patient has multiple recent addmisson in last few months for fluid overload needing urgent dialysis.   Pt denies any chest pain, palpitations, nausea, vomiting, change in bowel or urinary habits

## 2019-09-18 NOTE — ED PROVIDER NOTE - CLINICAL SUMMARY MEDICAL DECISION MAKING FREE TEXT BOX
Pt in no respiratory distress. MAR, Dr. Andino and Dr. Sebastian endorsed. Pt agrees with admission for inpt HD. CT abdomen reported no obstruction.   I had a detailed discussion with the patient and/or guardian regarding the historical points, exam findings, and any diagnostic results supporting the admit diagnosis.

## 2019-09-18 NOTE — H&P ADULT - PROBLEM SELECTOR PLAN 7
pt has history of psychiatric issues and carry diagnosis of schizophrenia and bipolar disorder.  Will continue home medications, risperidone, mirtazapine and paroxetine.

## 2019-09-19 LAB
ALBUMIN SERPL ELPH-MCNC: 3 G/DL — LOW (ref 3.5–5)
ALP SERPL-CCNC: 159 U/L — HIGH (ref 40–120)
ALT FLD-CCNC: 19 U/L DA — SIGNIFICANT CHANGE UP (ref 10–60)
ANION GAP SERPL CALC-SCNC: 9 MMOL/L — SIGNIFICANT CHANGE UP (ref 5–17)
AST SERPL-CCNC: 14 U/L — SIGNIFICANT CHANGE UP (ref 10–40)
BILIRUB SERPL-MCNC: 0.6 MG/DL — SIGNIFICANT CHANGE UP (ref 0.2–1.2)
BUN SERPL-MCNC: 48 MG/DL — HIGH (ref 7–18)
CALCIUM SERPL-MCNC: 9.4 MG/DL — SIGNIFICANT CHANGE UP (ref 8.4–10.5)
CHLORIDE SERPL-SCNC: 98 MMOL/L — SIGNIFICANT CHANGE UP (ref 96–108)
CO2 SERPL-SCNC: 28 MMOL/L — SIGNIFICANT CHANGE UP (ref 22–31)
CREAT SERPL-MCNC: 8.9 MG/DL — HIGH (ref 0.5–1.3)
GLUCOSE BLDC GLUCOMTR-MCNC: 110 MG/DL — HIGH (ref 70–99)
GLUCOSE BLDC GLUCOMTR-MCNC: 127 MG/DL — HIGH (ref 70–99)
GLUCOSE BLDC GLUCOMTR-MCNC: 91 MG/DL — SIGNIFICANT CHANGE UP (ref 70–99)
GLUCOSE BLDC GLUCOMTR-MCNC: 98 MG/DL — SIGNIFICANT CHANGE UP (ref 70–99)
GLUCOSE SERPL-MCNC: 123 MG/DL — HIGH (ref 70–99)
HBV SURFACE AG SER-ACNC: SIGNIFICANT CHANGE UP
HCT VFR BLD CALC: 30.9 % — LOW (ref 39–50)
HGB BLD-MCNC: 8.9 G/DL — LOW (ref 13–17)
MCHC RBC-ENTMCNC: 26.8 PG — LOW (ref 27–34)
MCHC RBC-ENTMCNC: 28.8 GM/DL — LOW (ref 32–36)
MCV RBC AUTO: 93.1 FL — SIGNIFICANT CHANGE UP (ref 80–100)
NRBC # BLD: 0 /100 WBCS — SIGNIFICANT CHANGE UP (ref 0–0)
PLATELET # BLD AUTO: 235 K/UL — SIGNIFICANT CHANGE UP (ref 150–400)
POTASSIUM SERPL-MCNC: 5.2 MMOL/L — SIGNIFICANT CHANGE UP (ref 3.5–5.3)
POTASSIUM SERPL-SCNC: 5.2 MMOL/L — SIGNIFICANT CHANGE UP (ref 3.5–5.3)
PROT SERPL-MCNC: 7.4 G/DL — SIGNIFICANT CHANGE UP (ref 6–8.3)
RBC # BLD: 3.32 M/UL — LOW (ref 4.2–5.8)
RBC # FLD: 18.4 % — HIGH (ref 10.3–14.5)
SODIUM SERPL-SCNC: 135 MMOL/L — SIGNIFICANT CHANGE UP (ref 135–145)
WBC # BLD: 7.59 K/UL — SIGNIFICANT CHANGE UP (ref 3.8–10.5)
WBC # FLD AUTO: 7.59 K/UL — SIGNIFICANT CHANGE UP (ref 3.8–10.5)

## 2019-09-19 PROCEDURE — 71045 X-RAY EXAM CHEST 1 VIEW: CPT | Mod: 26

## 2019-09-19 RX ADMIN — CHLORHEXIDINE GLUCONATE 1 APPLICATION(S): 213 SOLUTION TOPICAL at 13:54

## 2019-09-19 RX ADMIN — Medication 50 MILLIGRAM(S): at 05:32

## 2019-09-19 RX ADMIN — HEPARIN SODIUM 5000 UNIT(S): 5000 INJECTION INTRAVENOUS; SUBCUTANEOUS at 17:54

## 2019-09-19 RX ADMIN — Medication 80 MILLIGRAM(S): at 05:32

## 2019-09-19 RX ADMIN — Medication 500 MILLIGRAM(S): at 13:52

## 2019-09-19 RX ADMIN — SEVELAMER CARBONATE 800 MILLIGRAM(S): 2400 POWDER, FOR SUSPENSION ORAL at 17:54

## 2019-09-19 RX ADMIN — Medication 1 MILLIGRAM(S): at 13:53

## 2019-09-19 RX ADMIN — SIMVASTATIN 40 MILLIGRAM(S): 20 TABLET, FILM COATED ORAL at 21:31

## 2019-09-19 RX ADMIN — Medication 25 MILLIGRAM(S): at 05:32

## 2019-09-19 RX ADMIN — CLOPIDOGREL BISULFATE 75 MILLIGRAM(S): 75 TABLET, FILM COATED ORAL at 13:53

## 2019-09-19 RX ADMIN — Medication 25 MILLIGRAM(S): at 17:54

## 2019-09-19 RX ADMIN — HEPARIN SODIUM 5000 UNIT(S): 5000 INJECTION INTRAVENOUS; SUBCUTANEOUS at 05:34

## 2019-09-19 RX ADMIN — Medication 325 MILLIGRAM(S): at 13:52

## 2019-09-19 RX ADMIN — TRAMADOL HYDROCHLORIDE 50 MILLIGRAM(S): 50 TABLET ORAL at 05:31

## 2019-09-19 RX ADMIN — RISPERIDONE 1 MILLIGRAM(S): 4 TABLET ORAL at 21:30

## 2019-09-19 RX ADMIN — Medication 50 MILLIGRAM(S): at 13:53

## 2019-09-19 RX ADMIN — Medication 10 MILLIGRAM(S): at 13:53

## 2019-09-19 RX ADMIN — TRAMADOL HYDROCHLORIDE 50 MILLIGRAM(S): 50 TABLET ORAL at 20:16

## 2019-09-19 RX ADMIN — AMLODIPINE BESYLATE 5 MILLIGRAM(S): 2.5 TABLET ORAL at 05:32

## 2019-09-19 RX ADMIN — TRAMADOL HYDROCHLORIDE 50 MILLIGRAM(S): 50 TABLET ORAL at 21:30

## 2019-09-19 RX ADMIN — PANTOPRAZOLE SODIUM 40 MILLIGRAM(S): 20 TABLET, DELAYED RELEASE ORAL at 05:32

## 2019-09-19 RX ADMIN — GABAPENTIN 300 MILLIGRAM(S): 400 CAPSULE ORAL at 21:30

## 2019-09-19 RX ADMIN — INSULIN GLARGINE 20 UNIT(S): 100 INJECTION, SOLUTION SUBCUTANEOUS at 22:27

## 2019-09-19 RX ADMIN — SEVELAMER CARBONATE 800 MILLIGRAM(S): 2400 POWDER, FOR SUSPENSION ORAL at 13:53

## 2019-09-19 RX ADMIN — Medication 50 MILLIGRAM(S): at 21:30

## 2019-09-19 RX ADMIN — SEVELAMER CARBONATE 800 MILLIGRAM(S): 2400 POWDER, FOR SUSPENSION ORAL at 09:32

## 2019-09-19 RX ADMIN — MIRTAZAPINE 7.5 MILLIGRAM(S): 45 TABLET, ORALLY DISINTEGRATING ORAL at 21:30

## 2019-09-19 NOTE — PROGRESS NOTE ADULT - ASSESSMENT
kenny nd examined  vsstable afebrile  physical unchanged  no cp or sob or palp but still has abd pain .  no nausea or vomiting  bm are ok  abd soft bs nt  nd bs   labs noted   a/p  sob better   as pt has severe ascites  so far he is hep b, c neg   echo in april was ok   cxr improved  i d/w nephro as per him , he is not compliant for fluids   pt may go for hd again today.  surgery to look at umbilical area  always on bed   again advised him to be oob in chair 39 years old male from home with PMHx of ESRD on MWF HD (via LUE AVF), DM (on insulin), cocaine abuse, on home O2 (4L NC), morbid obesity, Bipolar disorder, schizophrenia and HTN presents to the ED with chief complaint of shortness of breath x2 days and worsening LE edema likely from fluid overload in setting of ESRD. per patient he completed HD on 09/16/19. Patient is also complaining of abdominal pain in umbilical region. Which is non-radiating and intermittent in nature. Patient has multiple recent admission in last few months for fluid overload needing urgent dialysis.   Pt denies any chest pain, palpitations, nausea, vomiting, change in bowel or urinary habits.  patient is admitted with fluid overload - s/p dialysis yesterday  and with another dialysis today 09/19   - surgery consulted for abdominal pain and CT finding concerning for ascites

## 2019-09-19 NOTE — PROGRESS NOTE ADULT - SUBJECTIVE AND OBJECTIVE BOX
[facility] : Progress Note :: [phone], (office [phone]),   Dr La / Dr Hui / Dr Barber / Dr Villalobos / Dr Hang CASTELLANO / Dr Mehta / Dr Sanchez / Dr Alber dumont  _____________________________________________________________________________________________  Seen earlier on HD.    aspirin (Short breath)  aspirin (Swelling)  penicillin (Short breath)  penicillins (Swelling)  shellfish (Unknown)    Hospital Medications:   MEDICATIONS  (STANDING):  amLODIPine   Tablet 5 milliGRAM(s) Oral daily  ascorbic acid 500 milliGRAM(s) Oral daily  chlorhexidine 2% Cloths 1 Application(s) Topical daily  clopidogrel Tablet 75 milliGRAM(s) Oral daily  epoetin cyndie Injectable 4000 Unit(s) IV Push <User Schedule>  ferrous    sulfate 325 milliGRAM(s) Oral daily  folic acid 1 milliGRAM(s) Oral daily  furosemide    Tablet 80 milliGRAM(s) Oral daily  gabapentin 300 milliGRAM(s) Oral at bedtime  heparin  Injectable 5000 Unit(s) SubCutaneous every 12 hours  hydrALAZINE 50 milliGRAM(s) Oral three times a day  insulin glargine Injectable (LANTUS) 20 Unit(s) SubCutaneous at bedtime  insulin lispro (HumaLOG) corrective regimen sliding scale   SubCutaneous three times a day before meals  insulin lispro (HumaLOG) corrective regimen sliding scale   SubCutaneous at bedtime  metoprolol tartrate 25 milliGRAM(s) Oral two times a day  mirtazapine 7.5 milliGRAM(s) Oral at bedtime  pantoprazole    Tablet 40 milliGRAM(s) Oral before breakfast  PARoxetine 10 milliGRAM(s) Oral daily  risperiDONE   Tablet 1 milliGRAM(s) Oral at bedtime  sevelamer carbonate 800 milliGRAM(s) Oral three times a day with meals  simvastatin 40 milliGRAM(s) Oral at bedtime        VITALS:  T(F): 98.2 (09-19-19 @ 17:15), Max: 98.9 (09-19-19 @ 05:02)  HR: 86 (09-19-19 @ 17:51)  BP: 126/79 (09-19-19 @ 17:51)  RR: 17 (09-19-19 @ 17:15)  SpO2: 100% (09-19-19 @ 17:15)  Wt(kg): --    09-18 @ 07:01  -  09-19 @ 07:00  --------------------------------------------------------  IN: 0 mL / OUT: 150 mL / NET: -150 mL        PHYSICAL EXAM:  Constitutional: NAD  HEENT: anicteric sclera, oropharynx clear.  Neck: No JVD  Respiratory: CTAB, no wheezes, rales or rhonchi  Cardiovascular: S1, S2, RRR  Gastrointestinal: BS+, soft, joshua-umbilical tenderness.  Extremities:   peripheral edema++  Neurological: A/O x 3, no focal deficits  : No CVA tenderness. No hernandez.   Skin: No rashes  Vascular Access: AVF cannulated    LABS:  09-19    135  |  98  |  48<H>  ----------------------------<  123<H>  5.2   |  28  |  8.90<H>    Ca    9.4      19 Sep 2019 14:56    TPro  7.4  /  Alb  3.0<L>  /  TBili  0.6  /  DBili      /  AST  14  /  ALT  19  /  AlkPhos  159<H>  09-19    Creatinine Trend: 8.90 <--, 9.56 <--                        8.9    7.59  )-----------( 235      ( 19 Sep 2019 14:56 )             30.9     Urine Studies:      RADIOLOGY & ADDITIONAL STUDIES:

## 2019-09-19 NOTE — PROGRESS NOTE ADULT - SUBJECTIVE AND OBJECTIVE BOX
PGY 1 Note discussed with supervising resident and primary attending    Patient is a 39y old  Male who presents with a chief complaint of shortness of breath and abdominal pain. (18 Sep 2019 15:38)      INTERVAL HPI/OVERNIGHT EVENTS: offers no new complaints; current symptoms resolving    MEDICATIONS  (STANDING):  amLODIPine   Tablet 5 milliGRAM(s) Oral daily  ascorbic acid 500 milliGRAM(s) Oral daily  chlorhexidine 2% Cloths 1 Application(s) Topical daily  clopidogrel Tablet 75 milliGRAM(s) Oral daily  epoetin cyndie Injectable 4000 Unit(s) IV Push <User Schedule>  ferrous    sulfate 325 milliGRAM(s) Oral daily  folic acid 1 milliGRAM(s) Oral daily  furosemide    Tablet 80 milliGRAM(s) Oral daily  gabapentin 300 milliGRAM(s) Oral at bedtime  heparin  Injectable 5000 Unit(s) SubCutaneous every 12 hours  hydrALAZINE 50 milliGRAM(s) Oral three times a day  insulin glargine Injectable (LANTUS) 20 Unit(s) SubCutaneous at bedtime  insulin lispro (HumaLOG) corrective regimen sliding scale   SubCutaneous three times a day before meals  insulin lispro (HumaLOG) corrective regimen sliding scale   SubCutaneous at bedtime  metoprolol tartrate 25 milliGRAM(s) Oral two times a day  mirtazapine 7.5 milliGRAM(s) Oral at bedtime  pantoprazole    Tablet 40 milliGRAM(s) Oral before breakfast  PARoxetine 10 milliGRAM(s) Oral daily  risperiDONE   Tablet 1 milliGRAM(s) Oral at bedtime  sevelamer carbonate 800 milliGRAM(s) Oral three times a day with meals  simvastatin 40 milliGRAM(s) Oral at bedtime    MEDICATIONS  (PRN):  acetaminophen   Tablet .. 650 milliGRAM(s) Oral every 6 hours PRN Temp greater or equal to 38C (100.4F), Mild Pain (1 - 3), Moderate Pain (4 - 6)  methocarbamol 500 milliGRAM(s) Oral every 12 hours PRN spasms  traMADol 50 milliGRAM(s) Oral every 6 hours PRN Severe Pain (7 - 10)      __________________________________________________  REVIEW OF SYSTEMS:    CONSTITUTIONAL: No fever,   EYES: no acute visual disturbances  NECK: No pain or stiffness  RESPIRATORY: No cough; No shortness of breath  CARDIOVASCULAR: No chest pain, no palpitations  GASTROINTESTINAL: No pain. No nausea or vomiting; No diarrhea   NEUROLOGICAL: No headache or numbness, no tremors  MUSCULOSKELETAL: No joint pain, no muscle pain  GENITOURINARY: no dysuria, no frequency, no hesitancy  PSYCHIATRY: no depression , no anxiety  ALL OTHER  ROS negative        Vital Signs Last 24 Hrs  T(C): 37.2 (19 Sep 2019 05:02), Max: 37.2 (19 Sep 2019 05:02)  T(F): 98.9 (19 Sep 2019 05:02), Max: 98.9 (19 Sep 2019 05:02)  HR: 94 (19 Sep 2019 05:02) (77 - 94)  BP: 152/93 (19 Sep 2019 05:02) (134/77 - 163/67)  BP(mean): --  RR: 18 (19 Sep 2019 05:02) (17 - 20)  SpO2: 98% (19 Sep 2019 05:02) (98% - 100%)    ________________________________________________  PHYSICAL EXAM:  GENERAL: NAD  HEENT: Normocephalic;  conjunctivae and sclerae clear; moist mucous membranes;   NECK : supple  CHEST/LUNG: Clear to auscultation bilaterally with good air entry   HEART: S1 S2  regular; no murmurs, gallops or rubs  ABDOMEN: Soft, Nontender, Nondistended; Bowel sounds present  EXTREMITIES: no cyanosis; no edema; no calf tenderness  SKIN: warm and dry; no rash  NERVOUS SYSTEM:  Awake and alert; Oriented  to place, person and time ; no new deficits    _________________________________________________  LABS:                        9.7    8.06  )-----------( 280      ( 18 Sep 2019 01:46 )             33.3     09-18    135  |  97  |  52<H>  ----------------------------<  113<H>  5.3   |  33<H>  |  9.56<H>    Ca    9.6      18 Sep 2019 01:46    TPro  8.1  /  Alb  3.3<L>  /  TBili  0.6  /  DBili  x   /  AST  18  /  ALT  19  /  AlkPhos  169<H>  09-18    PT/INR - ( 18 Sep 2019 01:46 )   PT: 13.0 sec;   INR: 1.17 ratio         PTT - ( 18 Sep 2019 01:46 )  PTT:35.1 sec    CAPILLARY BLOOD GLUCOSE      POCT Blood Glucose.: 91 mg/dL (19 Sep 2019 08:01)  POCT Blood Glucose.: 99 mg/dL (18 Sep 2019 22:32)  POCT Blood Glucose.: 90 mg/dL (18 Sep 2019 17:36)        RADIOLOGY & ADDITIONAL TESTS:    Imaging Personally Reviewed:  YES/NO    Consultant(s) Notes Reviewed:   YES/ No    Care Discussed with Consultants :     Plan of care was discussed with patient and /or primary care giver; all questions and concerns were addressed and care was aligned with patient's wishes.

## 2019-09-19 NOTE — PROGRESS NOTE ADULT - ASSESSMENT
kenny nd examined  vsstable afebrile  physical unchanged  no cp or sob or palp but still has abd pain .  no nausea or vomiting  bm are ok  abd soft bs nt  nd bs   labs noted   a/p  sob better   as pt has severe ascites  so far he is hep b, c neg   echo in april was ok   cxr improved  i d/w nephro as per him , he is not compliant for fluids   pt may go for hd again today.  surgery to look at umbilical area  always on bed   again advised him to be oob in chair

## 2019-09-19 NOTE — CONSULT NOTE ADULT - ASSESSMENT
39 year old male with abdominal pain, SOB, Ascites on imaging.     Problem list :  Ascites   Obesity  Abdominal pain   Umbilical hernia   NAFLD       Plan:  CT scan reviewed   -He has fatty liver disease 2/2 to metabolic syndrome   -HE does not have cirrhosis's based on a calculated APRI score as well as with normal LFTS.  -Surgery is following for abdominal apin although I don't appreciate a large hernia.   -Unclear about "ascites"" seen on CT scan but I suspect it is secoadnry to his fluid overload. Would monitor for now.       Thank you for your consultation and allowing  me to participate in the care of your patients. If you have further questions please contact me at [phone].     Remy Carpenter M.D.

## 2019-09-19 NOTE — PROGRESS NOTE ADULT - PROBLEM SELECTOR PLAN 1
pt presented with Shortness of breath. Patient is on home oxygen.  Looks to be in fluid overload.  Per patient last HD was on 09/16/2019.  Urgent HD planned.  Patient is undergoing HD currently.  Nephro consult dr La. pt presented with Shortness of breath. Patient is on home oxygen.  Looks to be in fluid overload.  Per patient last HD was on 09/16/2019.  Urgent HD 09/18 - and today again 09/19   Patient is undergoing HD currently.  Nephro consult dr La.

## 2019-09-19 NOTE — CONSULT NOTE ADULT - ASSESSMENT
39M 39M PMHx of ESRD on HD, DM, morbid obesity, on home oxygen, bipolar disorder, cocaine abuse, schizophrenia, HTN. hx of umbilical hernia s/p lap hernia repair admitted for fluid overload and urgent dialysis also w/ periumbilical pain x 3 weeks.    Unclear source of periumbilical pain, no palpable mass on physical exam and no hernia seen on CT  Possible small fat containing hernia?     - Care per primary team  - No surgical intervention at this time      Discussed with Dr. Pickett

## 2019-09-19 NOTE — CONSULT NOTE ADULT - SUBJECTIVE AND OBJECTIVE BOX
Patient is a 39y old  Male who presents with a chief complaint of shortness of breath and abdominal pain. (19 Sep 2019 16:09)    39 years old male with a pMHx of ESRD on MWF HD (via LUE AVF), DM (on insulin), cocaine abuse, on home O2 (4L NC), morbid obesity, Bipolar disorder, schizophrenia and HTN presents to the ED with chief complaint of shortness of breath x2 days and worsening LE edema likely from fluid overload in setting of ESRD.  Complains of sharp pain around the periumbilical area. .      General surgery consulted for pts periumbilical pain.   REVIEW OF SYSTEMS  Constitutional:   No fever, no fatigue, no pallor, no night sweats, no weight loss.  HEENT:   No eye pain, no vision changes, no icterus, no mouth ulcers.  Respiratory:   + shortness of breath, no cough, no respiratory distress.   Cardiovascular:   No chest pain, no palpitations.   Gastrointestinal: + abdominal pain, no nausea, no vomiting , no diarrhea no constipation, no hematochezia, no melena.  Skin:   No rashes, no jaundice, no eczema.   Musculoskeletal:   No joint pain, no swelling, no myalgia.   Neurologic:   No headache, no seizure, no weakness.   Genitourinary:   No dysuria, no decreased urine output.  Psychiatric:  No depression, no anxiety,   Endocrine:   No thyroid disease, no diabetes.  Heme/Lymphatic:   No anemia, no blood transfusions, no lymph node enlargement, no bleeding, no bruising.  ___________________________________________________________________________________________  Allergies    aspirin (Short breath)  aspirin (Swelling)  penicillin (Short breath)  penicillins (Swelling)  shellfish (Unknown)    Intolerances      MEDICATIONS  (STANDING):  amLODIPine   Tablet 5 milliGRAM(s) Oral daily  ascorbic acid 500 milliGRAM(s) Oral daily  chlorhexidine 2% Cloths 1 Application(s) Topical daily  clopidogrel Tablet 75 milliGRAM(s) Oral daily  epoetin cyndie Injectable 4000 Unit(s) IV Push <User Schedule>  ferrous    sulfate 325 milliGRAM(s) Oral daily  folic acid 1 milliGRAM(s) Oral daily  furosemide    Tablet 80 milliGRAM(s) Oral daily  gabapentin 300 milliGRAM(s) Oral at bedtime  heparin  Injectable 5000 Unit(s) SubCutaneous every 12 hours  hydrALAZINE 50 milliGRAM(s) Oral three times a day  insulin glargine Injectable (LANTUS) 20 Unit(s) SubCutaneous at bedtime  insulin lispro (HumaLOG) corrective regimen sliding scale   SubCutaneous three times a day before meals  insulin lispro (HumaLOG) corrective regimen sliding scale   SubCutaneous at bedtime  metoprolol tartrate 25 milliGRAM(s) Oral two times a day  mirtazapine 7.5 milliGRAM(s) Oral at bedtime  pantoprazole    Tablet 40 milliGRAM(s) Oral before breakfast  PARoxetine 10 milliGRAM(s) Oral daily  risperiDONE   Tablet 1 milliGRAM(s) Oral at bedtime  sevelamer carbonate 800 milliGRAM(s) Oral three times a day with meals  simvastatin 40 milliGRAM(s) Oral at bedtime    MEDICATIONS  (PRN):  acetaminophen   Tablet .. 650 milliGRAM(s) Oral every 6 hours PRN Temp greater or equal to 38C (100.4F), Mild Pain (1 - 3), Moderate Pain (4 - 6)  methocarbamol 500 milliGRAM(s) Oral every 12 hours PRN spasms  traMADol 50 milliGRAM(s) Oral every 6 hours PRN Severe Pain (7 - 10)      PAST MEDICAL & SURGICAL HISTORY:  Migraine  HTN (hypertension)  DM (diabetes mellitus)  Bipolar disorder  Schizophrenia  ESRD on dialysis  Morbid obesity with BMI of 40.0-44.9, adult  H/O hernia repair    FAMILY HISTORY:  Family history of COPD (chronic obstructive pulmonary disease)  Family history of diabetes mellitus    Social History: No hsitory of : Tobacco use, IVDA, EToH  ______________________________________________________________________________________    PHYSICAL EXAM    Daily     Daily Weight in k (19 Sep 2019 14:50)  BMI: 50.2 ( @ 23:04)  Change in Weight:  Vital Signs Last 24 Hrs  T(C): 36.5 (19 Sep 2019 14:50), Max: 37.2 (19 Sep 2019 05:02)  T(F): 97.7 (19 Sep 2019 14:50), Max: 98.9 (19 Sep 2019 05:02)  HR: 80 (19 Sep 2019 14:50) (77 - 94)  BP: 136/81 (19 Sep 2019 14:50) (134/77 - 163/67)  BP(mean): --  RR: 18 (19 Sep 2019 14:50) (18 - 18)  SpO2: 98% (19 Sep 2019 05:02) (98% - 100%)    General:  Well developed, well nourished, alert and active, no pallor, NAD.  HEENT:    Normal appearance of conjunctiva, ears, nose, lips, oropharynx, and oral mucosa, anicteric.  Neck:  No masses, no asymmetry.  Lymph Nodes:  No lymphadenopathy.   Cardiovascular:  RRR normal S1/S2, no murmur.  Respiratory:  CTA B/L, normal respiratory effort.   Abdominal:   soft, no masses or tenderness, normoactive BS, NT/ND, no HSM. No hernia appreciated   Extremities:   No clubbing or cyanosis, normal capillary refill, no edema.   Skin:   No rash, jaundice, lesions, eczema.   Musculoskeletal:  No joint swelling, erythema or tenderness.   Neuro: No focal deficits.   Other:   _______________________________________________________________________________________________  Lab Results:                          8.9    7.59  )-----------( 235      ( 19 Sep 2019 14:56 )             30.9         135  |  98  |  48<H>  ----------------------------<  123<H>  5.2   |  28  |  8.90<H>    Ca    9.4      19 Sep 2019 14:56    TPro  7.4  /  Alb  3.0<L>  /  TBili  0.6  /  DBili  x   /  AST  14  /  ALT  19  /  AlkPhos  159<H>      LIVER FUNCTIONS - ( 19 Sep 2019 14:56 )  Alb: 3.0 g/dL / Pro: 7.4 g/dL / ALK PHOS: 159 U/L / ALT: 19 U/L DA / AST: 14 U/L / GGT: x           PT/INR - ( 18 Sep 2019 01:46 )   PT: 13.0 sec;   INR: 1.17 ratio         PTT - ( 18 Sep 2019 01:46 )  PTT:35.1 sec    CARDIAC MARKERS ( 18 Sep 2019 01:46 )  0.023 ng/mL / x     / x     / x     / x          Stool Results:          RADIOLOGY RESULTS:  < from: CT Abdomen and Pelvis w/ IV Cont (19 @ 04:35) >    EXAM:  CT ABDOMEN AND PELVIS IC                            PROCEDURE DATE:  2019          INTERPRETATION:  CLINICAL INFORMATION: Ventral abdominal pain    COMPARISON: 2019    PROCEDURE:   CT of the Abdomen and Pelvis was performed with intravenous contrast.   Intravenous contrast: 125 ml Omnipaque 350. 75 ml discarded.  Oral contrast: None.  Sagittal and coronal reformats were performed.    FINDINGS:    Evaluation is degraded by body habitus. Portions of the left ventral   abdominal wall are excluded from the images. There is regional beam   hardening artifact.    LOWER CHEST: Mild bibasilar dependent and platelike atelectasis.   Cardiomegaly. Small pericardial effusion.    LIVER: Hepatic steatosis. Mild heterogeneity of the liver.  BILE DUCTS: Normal caliber.  GALLBLADDER: Normally distended. No calcified gallstones. Nonspecific   pericholecystic fluid.  SPLEEN: Within normal limits.  PANCREAS: Pancreas enhances relatively homogeneously. Small volume of   fluid near the pancreatic head and adjacent to the gallbladder.  ADRENALS: Within normal limits.  KIDNEYS/URETERS: Mildly atrophic. Enhance symmetrically. No right or left   hydronephrosis.    BLADDER: Underdistended.  REPRODUCTIVE ORGANS: Prostate gland and seminal vesicles are unremarkable.    BOWEL: No bowel obstruction. Appendix is normal.  PERITONEUM: Small volume of abdominal and pelvic ascites. No   pneumoperitoneum.  VESSELS: Normal caliber abdominal aorta.  RETROPERITONEUM/LYMPH NODES: Scattered nonspecificsubcentimeter   retroperitoneal lymph nodes.    ABDOMINAL WALL: No obvious ventral abdominal wall hernia.  BONES: Mild degenerative changes of the spine.    IMPRESSION:    Examination degraded by body habitus and resultant beam hardening   artifact.    No bowel obstruction.    Small volume of abdominal and pelvic ascites, as well as, fluid around   the gallbladder and near the pancreatic head. Correlate with LFTs and   amylase/lipase. Sonographic evaluation may be considered, as clinically   indicated.                    EMMY FERNANDEZ D.O. ATTENDING RADIOLOGIST  This document has been electronically signed. Sep 18 2019  4:55AM                < end of copied text >    SURGICAL PATHOLOGY:

## 2019-09-19 NOTE — SBIRT NOTE ADULT - NSSBIRTALCPOSREINDET_GEN_A_CORE
Patient reports he has not had alcohol for 2 months due to medical reasons; was educated on the risks of alcohol use and provided with positive reinforcement reg: current non-use.

## 2019-09-19 NOTE — PROGRESS NOTE ADULT - SUBJECTIVE AND OBJECTIVE BOX
HPI:  39 years old male from home with PMHx of ESRD on MWF HD (via [facility]), DM (on insulin), cocaine abuse, on home O2 (4L NC), morbid obesity, Bipolar disorder, schizophrenia and HTN presents to the ED with chief complaint of shortness of breath x2 days and worsening LE edema likely from fluid overload in setting of ESRD. per patient he completed HD on 09/16/19. Patient is also complaining of abdominal pain in umbilical region. Which is non-radiating and intermittent in nature. Patient has multiple recent addmisson in last few months for fluid overload needing urgent dialysis.   Pt denies any chest pain, palpitations, nausea, vomiting, change in bowel or urinary habits (18 Sep 2019 09:36)      Patient is a 39y old  Male who presents with a chief complaint of shortness of breath and abdominal pain. (19 Sep 2019 11:26)      INTERVAL HPI/OVERNIGHT EVENTS:  T(C): 37.2 (09-19-19 @ 05:02), Max: 37.2 (09-19-19 @ 05:02)  HR: 94 (09-19-19 @ 05:02) (77 - 94)  BP: 152/93 (09-19-19 @ 05:02) (134/77 - 163/67)  RR: 18 (09-19-19 @ 05:02) (17 - 20)  SpO2: 98% (09-19-19 @ 05:02) (98% - 100%)  Wt(kg): --  I&O's Summary    18 Sep 2019 07:01  -  19 Sep 2019 07:00  --------------------------------------------------------  IN: 0 mL / OUT: 150 mL / NET: -150 mL        REVIEW OF SYSTEMS: denies fever, chills, SOB, palpitations, chest pain, abdominal pain, nausea, vomitting, diarrhea, constipation, dizziness    MEDICATIONS  (STANDING):  amLODIPine   Tablet 5 milliGRAM(s) Oral daily  ascorbic acid 500 milliGRAM(s) Oral daily  chlorhexidine 2% Cloths 1 Application(s) Topical daily  clopidogrel Tablet 75 milliGRAM(s) Oral daily  epoetin cyndie Injectable 4000 Unit(s) IV Push <User Schedule>  ferrous    sulfate 325 milliGRAM(s) Oral daily  folic acid 1 milliGRAM(s) Oral daily  furosemide    Tablet 80 milliGRAM(s) Oral daily  gabapentin 300 milliGRAM(s) Oral at bedtime  heparin  Injectable 5000 Unit(s) SubCutaneous every 12 hours  hydrALAZINE 50 milliGRAM(s) Oral three times a day  insulin glargine Injectable (LANTUS) 20 Unit(s) SubCutaneous at bedtime  insulin lispro (HumaLOG) corrective regimen sliding scale   SubCutaneous three times a day before meals  insulin lispro (HumaLOG) corrective regimen sliding scale   SubCutaneous at bedtime  metoprolol tartrate 25 milliGRAM(s) Oral two times a day  mirtazapine 7.5 milliGRAM(s) Oral at bedtime  pantoprazole    Tablet 40 milliGRAM(s) Oral before breakfast  PARoxetine 10 milliGRAM(s) Oral daily  risperiDONE   Tablet 1 milliGRAM(s) Oral at bedtime  sevelamer carbonate 800 milliGRAM(s) Oral three times a day with meals  simvastatin 40 milliGRAM(s) Oral at bedtime    MEDICATIONS  (PRN):  acetaminophen   Tablet .. 650 milliGRAM(s) Oral every 6 hours PRN Temp greater or equal to 38C (100.4F), Mild Pain (1 - 3), Moderate Pain (4 - 6)  methocarbamol 500 milliGRAM(s) Oral every 12 hours PRN spasms  traMADol 50 milliGRAM(s) Oral every 6 hours PRN Severe Pain (7 - 10)      PHYSICAL EXAM:  GENERAL: NAD, well-groomed, well-developed  HEAD:  Atraumatic, Normocephalic  EYES: EOMI, PERRLA, conjunctiva and sclera clear  ENMT: No tonsillar erythema, exudates, or enlargement; Moist mucous membranes, Good dentition, No lesions  NECK: Supple, No JVD, Normal thyroid  NERVOUS SYSTEM:  Alert & Oriented X3, Good concentration; Motor Strength 5/5 B/L upper and lower extremities; DTRs 2+ intact and symmetric  CHEST/LUNG: Clear to percussion bilaterally; No rales, rhonchi, wheezing, or rubs  HEART: Regular rate and rhythm; No murmurs, rubs, or gallops  ABDOMEN: Soft, Nontender, Nondistended; Bowel sounds present  EXTREMITIES:  2+ Peripheral Pulses, No clubbing, cyanosis, or edema  LYMPH: No lymphadenopathy noted  SKIN: No rashes or lesions  LABS:                        9.7    8.06  )-----------( 280      ( 18 Sep 2019 01:46 )             33.3     09-18    135  |  97  |  52<H>  ----------------------------<  113<H>  5.3   |  33<H>  |  9.56<H>    Ca    9.6      18 Sep 2019 01:46    TPro  8.1  /  Alb  3.3<L>  /  TBili  0.6  /  DBili  x   /  AST  18  /  ALT  19  /  AlkPhos  169<H>  09-18    PT/INR - ( 18 Sep 2019 01:46 )   PT: 13.0 sec;   INR: 1.17 ratio         PTT - ( 18 Sep 2019 01:46 )  PTT:35.1 sec    CAPILLARY BLOOD GLUCOSE      POCT Blood Glucose.: 98 mg/dL (19 Sep 2019 12:43)  POCT Blood Glucose.: 91 mg/dL (19 Sep 2019 08:01)  POCT Blood Glucose.: 99 mg/dL (18 Sep 2019 22:32)  POCT Blood Glucose.: 90 mg/dL (18 Sep 2019 17:36)

## 2019-09-19 NOTE — PROGRESS NOTE ADULT - SUBJECTIVE AND OBJECTIVE BOX
PGY 1 Note discussed with supervising resident and primary attending    Patient is a 39y old  Male who presents with a chief complaint of shortness of breath and abdominal pain. (19 Sep 2019 13:03)      INTERVAL HPI/OVERNIGHT EVENTS: offers no new complaints; current symptoms resolving    MEDICATIONS  (STANDING):  amLODIPine   Tablet 5 milliGRAM(s) Oral daily  ascorbic acid 500 milliGRAM(s) Oral daily  chlorhexidine 2% Cloths 1 Application(s) Topical daily  clopidogrel Tablet 75 milliGRAM(s) Oral daily  epoetin cyndie Injectable 4000 Unit(s) IV Push <User Schedule>  ferrous    sulfate 325 milliGRAM(s) Oral daily  folic acid 1 milliGRAM(s) Oral daily  furosemide    Tablet 80 milliGRAM(s) Oral daily  gabapentin 300 milliGRAM(s) Oral at bedtime  heparin  Injectable 5000 Unit(s) SubCutaneous every 12 hours  hydrALAZINE 50 milliGRAM(s) Oral three times a day  insulin glargine Injectable (LANTUS) 20 Unit(s) SubCutaneous at bedtime  insulin lispro (HumaLOG) corrective regimen sliding scale   SubCutaneous three times a day before meals  insulin lispro (HumaLOG) corrective regimen sliding scale   SubCutaneous at bedtime  metoprolol tartrate 25 milliGRAM(s) Oral two times a day  mirtazapine 7.5 milliGRAM(s) Oral at bedtime  pantoprazole    Tablet 40 milliGRAM(s) Oral before breakfast  PARoxetine 10 milliGRAM(s) Oral daily  risperiDONE   Tablet 1 milliGRAM(s) Oral at bedtime  sevelamer carbonate 800 milliGRAM(s) Oral three times a day with meals  simvastatin 40 milliGRAM(s) Oral at bedtime    MEDICATIONS  (PRN):  acetaminophen   Tablet .. 650 milliGRAM(s) Oral every 6 hours PRN Temp greater or equal to 38C (100.4F), Mild Pain (1 - 3), Moderate Pain (4 - 6)  methocarbamol 500 milliGRAM(s) Oral every 12 hours PRN spasms  traMADol 50 milliGRAM(s) Oral every 6 hours PRN Severe Pain (7 - 10)      __________________________________________________  REVIEW OF SYSTEMS:    CONSTITUTIONAL: No fever,   EYES: no acute visual disturbances  NECK: No pain or stiffness  RESPIRATORY: No cough; No shortness of breath  CARDIOVASCULAR: No chest pain, no palpitations  GASTROINTESTINAL: No pain. No nausea or vomiting; No diarrhea   NEUROLOGICAL: No headache or numbness, no tremors  MUSCULOSKELETAL: No joint pain, no muscle pain  GENITOURINARY: no dysuria, no frequency, no hesitancy  PSYCHIATRY: no depression , no anxiety  ALL OTHER  ROS negative        Vital Signs Last 24 Hrs  T(C): 36.5 (19 Sep 2019 14:50), Max: 37.2 (19 Sep 2019 05:02)  T(F): 97.7 (19 Sep 2019 14:50), Max: 98.9 (19 Sep 2019 05:02)  HR: 80 (19 Sep 2019 14:50) (77 - 94)  BP: 136/81 (19 Sep 2019 14:50) (134/77 - 163/67)  BP(mean): --  RR: 18 (19 Sep 2019 14:50) (18 - 18)  SpO2: 98% (19 Sep 2019 05:02) (98% - 100%)    ________________________________________________  PHYSICAL EXAM:  GENERAL: NAD  HEENT: Normocephalic;  conjunctivae and sclerae clear; moist mucous membranes;   NECK : supple  CHEST/LUNG: Clear to auscultation bilaterally with good air entry   HEART: S1 S2  regular; no murmurs, gallops or rubs  ABDOMEN: Soft, Nontender, Nondistended; Bowel sounds present  EXTREMITIES: no cyanosis; no edema; no calf tenderness  SKIN: warm and dry; no rash  NERVOUS SYSTEM:  Awake and alert; Oriented  to place, person and time ; no new deficits    _________________________________________________  LABS:                        8.9    7.59  )-----------( 235      ( 19 Sep 2019 14:56 )             30.9     09-19    135  |  98  |  48<H>  ----------------------------<  123<H>  5.2   |  28  |  8.90<H>    Ca    9.4      19 Sep 2019 14:56    TPro  7.4  /  Alb  3.0<L>  /  TBili  0.6  /  DBili  x   /  AST  14  /  ALT  19  /  AlkPhos  159<H>  09-19    PT/INR - ( 18 Sep 2019 01:46 )   PT: 13.0 sec;   INR: 1.17 ratio         PTT - ( 18 Sep 2019 01:46 )  PTT:35.1 sec    CAPILLARY BLOOD GLUCOSE      POCT Blood Glucose.: 98 mg/dL (19 Sep 2019 12:43)  POCT Blood Glucose.: 91 mg/dL (19 Sep 2019 08:01)  POCT Blood Glucose.: 99 mg/dL (18 Sep 2019 22:32)  POCT Blood Glucose.: 90 mg/dL (18 Sep 2019 17:36)        RADIOLOGY & ADDITIONAL TESTS:    Imaging Personally Reviewed:  YES/NO    Consultant(s) Notes Reviewed:   YES/ No    Care Discussed with Consultants :     Plan of care was discussed with patient and /or primary care giver; all questions and concerns were addressed and care was aligned with patient's wishes. PGY 1 Note discussed with supervising resident and primary attending    Patient is a 39y old  Male who presents with a chief complaint of shortness of breath and abdominal pain. (19 Sep 2019 13:03)      INTERVAL HPI/OVERNIGHT EVENTS: complaints of bilateral thigh pain, breathing has improved, abdominal pain +    MEDICATIONS  (STANDING):  amLODIPine   Tablet 5 milliGRAM(s) Oral daily  ascorbic acid 500 milliGRAM(s) Oral daily  chlorhexidine 2% Cloths 1 Application(s) Topical daily  clopidogrel Tablet 75 milliGRAM(s) Oral daily  epoetin cyndie Injectable 4000 Unit(s) IV Push <User Schedule>  ferrous    sulfate 325 milliGRAM(s) Oral daily  folic acid 1 milliGRAM(s) Oral daily  furosemide    Tablet 80 milliGRAM(s) Oral daily  gabapentin 300 milliGRAM(s) Oral at bedtime  heparin  Injectable 5000 Unit(s) SubCutaneous every 12 hours  hydrALAZINE 50 milliGRAM(s) Oral three times a day  insulin glargine Injectable (LANTUS) 20 Unit(s) SubCutaneous at bedtime  insulin lispro (HumaLOG) corrective regimen sliding scale   SubCutaneous three times a day before meals  insulin lispro (HumaLOG) corrective regimen sliding scale   SubCutaneous at bedtime  metoprolol tartrate 25 milliGRAM(s) Oral two times a day  mirtazapine 7.5 milliGRAM(s) Oral at bedtime  pantoprazole    Tablet 40 milliGRAM(s) Oral before breakfast  PARoxetine 10 milliGRAM(s) Oral daily  risperiDONE   Tablet 1 milliGRAM(s) Oral at bedtime  sevelamer carbonate 800 milliGRAM(s) Oral three times a day with meals  simvastatin 40 milliGRAM(s) Oral at bedtime    MEDICATIONS  (PRN):  acetaminophen   Tablet .. 650 milliGRAM(s) Oral every 6 hours PRN Temp greater or equal to 38C (100.4F), Mild Pain (1 - 3), Moderate Pain (4 - 6)  methocarbamol 500 milliGRAM(s) Oral every 12 hours PRN spasms  traMADol 50 milliGRAM(s) Oral every 6 hours PRN Severe Pain (7 - 10)      __________________________________________________  REVIEW OF SYSTEMS:    CONSTITUTIONAL: obese   EYES: no acute visual disturbances  NECK: No pain or stiffness  RESPIRATORY: No cough; No shortness of breath  CARDIOVASCULAR: No chest pain, no palpitations  GASTROINTESTINAL: No pain. No nausea or vomiting; No diarrhea   NEUROLOGICAL: No headache or numbness, no tremors  MUSCULOSKELETAL: No joint pain, no muscle pain  GENITOURINARY: no dysuria, no frequency, no hesitancy  PSYCHIATRY: no depression , no anxiety  ALL OTHER  ROS negative        Vital Signs Last 24 Hrs  T(C): 36.5 (19 Sep 2019 14:50), Max: 37.2 (19 Sep 2019 05:02)  T(F): 97.7 (19 Sep 2019 14:50), Max: 98.9 (19 Sep 2019 05:02)  HR: 80 (19 Sep 2019 14:50) (77 - 94)  BP: 136/81 (19 Sep 2019 14:50) (134/77 - 163/67)  BP(mean): --  RR: 18 (19 Sep 2019 14:50) (18 - 18)  SpO2: 98% (19 Sep 2019 05:02) (98% - 100%)    ________________________________________________  PHYSICAL EXAM:  GENERAL: NAD  HEENT: Normocephalic;  conjunctivae and sclerae clear; moist mucous membranes;   NECK : supple  CHEST/LUNG: Clear to auscultation bilaterally with good air entry   HEART: S1 S2  regular; no murmurs, gallops or rubs  ABDOMEN: Soft, Nontender, Nondistended; Bowel sounds present  EXTREMITIES: no cyanosis; no edema; no calf tenderness  SKIN: warm and dry; no rash  NERVOUS SYSTEM:  Awake and alert; Oriented  to place, person and time ; no new deficits    _________________________________________________  LABS:                        8.9    7.59  )-----------( 235      ( 19 Sep 2019 14:56 )             30.9     09-19    135  |  98  |  48<H>  ----------------------------<  123<H>  5.2   |  28  |  8.90<H>    Ca    9.4      19 Sep 2019 14:56    TPro  7.4  /  Alb  3.0<L>  /  TBili  0.6  /  DBili  x   /  AST  14  /  ALT  19  /  AlkPhos  159<H>  09-19    PT/INR - ( 18 Sep 2019 01:46 )   PT: 13.0 sec;   INR: 1.17 ratio         PTT - ( 18 Sep 2019 01:46 )  PTT:35.1 sec    CAPILLARY BLOOD GLUCOSE      POCT Blood Glucose.: 98 mg/dL (19 Sep 2019 12:43)  POCT Blood Glucose.: 91 mg/dL (19 Sep 2019 08:01)  POCT Blood Glucose.: 99 mg/dL (18 Sep 2019 22:32)  POCT Blood Glucose.: 90 mg/dL (18 Sep 2019 17:36)        RADIOLOGY & ADDITIONAL TESTS:    Imaging Personally Reviewed:  YES/NO    Consultant(s) Notes Reviewed:   YES/ No    Care Discussed with Consultants :     Plan of care was discussed with patient and /or primary care giver; all questions and concerns were addressed and care was aligned with patient's wishes. PGY 1 Note discussed with supervising resident and primary attending    Patient is a 39y old  Male who presents with a chief complaint of shortness of breath and abdominal pain. (19 Sep 2019 13:03)      INTERVAL HPI/OVERNIGHT EVENTS: complaints of bilateral thigh pain, breathing has improved, abdominal pain +    MEDICATIONS  (STANDING):  amLODIPine   Tablet 5 milliGRAM(s) Oral daily  ascorbic acid 500 milliGRAM(s) Oral daily  chlorhexidine 2% Cloths 1 Application(s) Topical daily  clopidogrel Tablet 75 milliGRAM(s) Oral daily  epoetin cyndie Injectable 4000 Unit(s) IV Push <User Schedule>  ferrous    sulfate 325 milliGRAM(s) Oral daily  folic acid 1 milliGRAM(s) Oral daily  furosemide    Tablet 80 milliGRAM(s) Oral daily  gabapentin 300 milliGRAM(s) Oral at bedtime  heparin  Injectable 5000 Unit(s) SubCutaneous every 12 hours  hydrALAZINE 50 milliGRAM(s) Oral three times a day  insulin glargine Injectable (LANTUS) 20 Unit(s) SubCutaneous at bedtime  insulin lispro (HumaLOG) corrective regimen sliding scale   SubCutaneous three times a day before meals  insulin lispro (HumaLOG) corrective regimen sliding scale   SubCutaneous at bedtime  metoprolol tartrate 25 milliGRAM(s) Oral two times a day  mirtazapine 7.5 milliGRAM(s) Oral at bedtime  pantoprazole    Tablet 40 milliGRAM(s) Oral before breakfast  PARoxetine 10 milliGRAM(s) Oral daily  risperiDONE   Tablet 1 milliGRAM(s) Oral at bedtime  sevelamer carbonate 800 milliGRAM(s) Oral three times a day with meals  simvastatin 40 milliGRAM(s) Oral at bedtime    MEDICATIONS  (PRN):  acetaminophen   Tablet .. 650 milliGRAM(s) Oral every 6 hours PRN Temp greater or equal to 38C (100.4F), Mild Pain (1 - 3), Moderate Pain (4 - 6)  methocarbamol 500 milliGRAM(s) Oral every 12 hours PRN spasms  traMADol 50 milliGRAM(s) Oral every 6 hours PRN Severe Pain (7 - 10)      __________________________________________________  REVIEW OF SYSTEMS:    CONSTITUTIONAL: obese male   EYES: no acute visual disturbances  NECK: No pain or stiffness  RESPIRATORY: No cough; No shortness of breath  CARDIOVASCULAR: No chest pain, no palpitations  GASTROINTESTINAL: + pain   NEUROLOGICAL: No headache or numbness, no tremors  MUSCULOSKELETAL: + muscle pain  GENITOURINARY: no dysuria, no frequency, no hesitancy  PSYCHIATRY: no depression , no anxiety  ALL OTHER  ROS negative        Vital Signs Last 24 Hrs  T(C): 36.5 (19 Sep 2019 14:50), Max: 37.2 (19 Sep 2019 05:02)  T(F): 97.7 (19 Sep 2019 14:50), Max: 98.9 (19 Sep 2019 05:02)  HR: 80 (19 Sep 2019 14:50) (77 - 94)  BP: 136/81 (19 Sep 2019 14:50) (134/77 - 163/67)  BP(mean): --  RR: 18 (19 Sep 2019 14:50) (18 - 18)  SpO2: 98% (19 Sep 2019 05:02) (98% - 100%)    ________________________________________________  PHYSICAL EXAM:  GENERAL: NAD, obese male   HEENT: Normocephalic;  conjunctivae and sclerae clear; moist mucous membranes;   NECK : supple  CHEST/LUNG: Clear to auscultation bilaterally with good air entry   HEART: S1 S2  regular; no murmurs, gallops or rubs  ABDOMEN: Soft, mildly tender, Nondistended; Bowel sounds present  EXTREMITIES: no cyanosis; no edema; no calf tenderness  SKIN: warm and dry; no rash  NERVOUS SYSTEM:  Awake and alert; Oriented  to place, person and time ; no new deficits    _________________________________________________  LABS:                        8.9    7.59  )-----------( 235      ( 19 Sep 2019 14:56 )             30.9     09-19    135  |  98  |  48<H>  ----------------------------<  123<H>  5.2   |  28  |  8.90<H>    Ca    9.4      19 Sep 2019 14:56    TPro  7.4  /  Alb  3.0<L>  /  TBili  0.6  /  DBili  x   /  AST  14  /  ALT  19  /  AlkPhos  159<H>  09-19    PT/INR - ( 18 Sep 2019 01:46 )   PT: 13.0 sec;   INR: 1.17 ratio         PTT - ( 18 Sep 2019 01:46 )  PTT:35.1 sec    CAPILLARY BLOOD GLUCOSE      POCT Blood Glucose.: 98 mg/dL (19 Sep 2019 12:43)  POCT Blood Glucose.: 91 mg/dL (19 Sep 2019 08:01)  POCT Blood Glucose.: 99 mg/dL (18 Sep 2019 22:32)  POCT Blood Glucose.: 90 mg/dL (18 Sep 2019 17:36)        RADIOLOGY & ADDITIONAL TESTS:    Imaging Personally Reviewed:  YES/NO    Consultant(s) Notes Reviewed:   YES/ No    Care Discussed with Consultants :     Plan of care was discussed with patient and /or primary care giver; all questions and concerns were addressed and care was aligned with patient's wishes.

## 2019-09-19 NOTE — PROGRESS NOTE ADULT - PROBLEM SELECTOR PLAN 2
patient normal dialysis days are MWF.  Looks to be in fluid overload.  Per patient last HD was on 09/16/2019.  Urgent HD planned.  Patient is undergoing HD currently.  Nephro consult dr La. patient normal dialysis days are MWF.  Looks to be in fluid overload.  Per patient last HD was on 09/16/2019.  Urgent HD 09/18  Patient is undergoing HD currently.  Nephro consult dr La.

## 2019-09-19 NOTE — PROGRESS NOTE ADULT - PROBLEM SELECTOR PLAN 3
pt was complaining of adbominal pain in umblical region.  Slight tenderness on exam. Patient has hernia there.  CT abdomen is negative for any obstructive component.  Reported pain is out of proportion to physical exam finding.  ordered IV tylenol and tramadol.  pain management consult because of suspected opiod abuse.  COmplex care patient.   social work consulted pt was complaining of abdominal pain in umbilical region.  Slight tenderness on exam. Patient has hernia there.  CT abdomen is negative for any obstructive component.  Reported pain is out of proportion to physical exam finding.  ordered IV tylenol and tramadol.  pain management consult because of suspected opiod abuse.  COmplex care patient.   social work consulted  surgery consulted

## 2019-09-19 NOTE — PROGRESS NOTE ADULT - ASSESSMENT
Patient is a 39y Male with ESRD on HD Tuesday, Thursday and Friday at [facility]. Presented to ED overnight with SOB and abdominal pain. CT abdomen was unremarkable for any obstruction. CXR revealed pulmonary edema. Renal consulted overnight for emergent HD.  still complaining of joshua-umbilical pain.    # ESRD presented with pulmonary edema. s/p HD with yesterday  improvement of respiratory status.   Had UF today. HD in AM  # anemia of CKD. cont procrit  # abdominal pain. CT noted to be negative. SURGERY CONSULTED  # HTN controlled.  # CKDMBD. cont sevelamer

## 2019-09-19 NOTE — SBIRT NOTE ADULT - NSSBIRTDRGPOSREINDET_GEN_A_CORE
Patient reports he has stopped engaging in substance use that is other than required for medical reasons; reports he also has not use marijuana to better aide himself in quality of life due to medical reasons.  Was educated on the risks of substance use and provided with positive reinforcement reg: current non-use.

## 2019-09-19 NOTE — CONSULT NOTE ADULT - SUBJECTIVE AND OBJECTIVE BOX
HPI:  39 years old male from home with PMHx of ESRD on MWF HD (via [facility]), DM (on insulin), cocaine abuse, on home O2 (4L NC), morbid obesity, Bipolar disorder, schizophrenia and HTN presents to the ED with chief complaint of shortness of breath x2 days and worsening LE edema likely from fluid overload in setting of ESRD. per patient he completed HD on 09/16/19. Patient is also complaining of abdominal pain in umbilical region. Which is non-radiating and intermittent in nature. Patient has multiple recent admission in last few months for fluid overload needing urgent dialysis.   Pt denies any chest pain, palpitations, nausea, vomiting, change in bowel or urinary habits.      General surgery consulted for pts periumbilical pain.       SurgHx:   Social Hx:  Allergies: aspirin (Short breath)  aspirin (Swelling)  penicillin (Short breath)  penicillins (Swelling)  shellfish (Unknown)      Vitals: T(F): 97.7 (09-19-19 @ 14:50), Max: 98.9 (09-19-19 @ 05:02)  HR: 80 (09-19-19 @ 14:50) (77 - 94)  BP: 136/81 (09-19-19 @ 14:50) (134/77 - 163/67)  RR: 18 (09-19-19 @ 14:50) (18 - 18)  SpO2: 98% (09-19-19 @ 05:02) (98% - 100%)      Labs:                         8.9    7.59  )-----------( 235      ( 19 Sep 2019 14:56 )             30.9   09-19    135  |  98  |  48<H>  ----------------------------<  123<H>  5.2   |  28  |  8.90<H>    Ca    9.4      19 Sep 2019 14:56    TPro  7.4  /  Alb  3.0<L>  /  TBili  0.6  /  DBili  x   /  AST  14  /  ALT  19  /  AlkPhos  159<H>  09-19    Physical Exam:  General: AAO x 3, No acute distress  Resp:  Abdomen:   Extremities:    Imaging:  < from: CT Abdomen and Pelvis w/ IV Cont (09.18.19 @ 04:35) >    EXAM:  CT ABDOMEN AND PELVIS IC                            PROCEDURE DATE:  09/18/2019          INTERPRETATION:  CLINICAL INFORMATION: Ventral abdominal pain    COMPARISON: 2/21/2019    PROCEDURE:   CT of the Abdomen and Pelvis was performed with intravenous contrast.   Intravenous contrast: 125 ml Omnipaque 350. 75 ml discarded.  Oral contrast: None.  Sagittal and coronal reformats were performed.    FINDINGS:    Evaluation is degraded by body habitus. Portions of the left ventral   abdominal wall are excluded from the images. There is regional beam   hardening artifact.    LOWER CHEST: Mild bibasilar dependent and platelike atelectasis.   Cardiomegaly. Small pericardial effusion.    LIVER: Hepatic steatosis. Mild heterogeneity of the liver.  BILE DUCTS: Normal caliber.  GALLBLADDER: Normally distended. No calcified gallstones. Nonspecific   pericholecystic fluid.  SPLEEN: Within normal limits.  PANCREAS: Pancreas enhances relatively homogeneously. Small volume of   fluid near the pancreatic head and adjacent to the gallbladder.  ADRENALS: Within normal limits.  KIDNEYS/URETERS: Mildly atrophic. Enhance symmetrically. No right or left   hydronephrosis.    BLADDER: Underdistended.  REPRODUCTIVE ORGANS: Prostate gland and seminal vesicles are unremarkable.    BOWEL: No bowel obstruction. Appendix is normal.  PERITONEUM: Small volume of abdominal and pelvic ascites. No   pneumoperitoneum.  VESSELS: Normal caliber abdominal aorta.  RETROPERITONEUM/LYMPH NODES: Scattered nonspecificsubcentimeter   retroperitoneal lymph nodes.    ABDOMINAL WALL: No obvious ventral abdominal wall hernia.  BONES: Mild degenerative changes of the spine.    IMPRESSION:    Examination degraded by body habitus and resultant beam hardening   artifact.    No bowel obstruction.    Small volume of abdominal and pelvic ascites, as well as, fluid around   the gallbladder and near the pancreatic head. Correlate with LFTs and   amylase/lipase. Sonographic evaluation may be considered, as clinically   indicated.    EMMY FERNANDEZ D.O. ATTENDING RADIOLOGIST  This document has been electronically signed. Sep 18 2019  4:55AM    < end of copied text > HPI:  39 years old male from home with PMHx of ESRD on MWF HD (via [facility]), DM (on insulin), cocaine abuse, on home O2 (4L NC), morbid obesity, Bipolar disorder, schizophrenia and HTN presents to the ED with chief complaint of shortness of breath x2 days and worsening LE edema likely from fluid overload in setting of ESRD. per patient he completed HD on 09/16/19. Patient is also complaining of abdominal pain in umbilical region. Which is non-radiating and intermittent in nature. Patient has multiple recent admission in last few months for fluid overload needing urgent dialysis.   Pt denies any chest pain, palpitations, nausea, vomiting, change in bowel or urinary habits.      General surgery consulted for periumbilical pain. He states hes had pain right under his umbilicus for 3 weeks. Admits to having similar pain years ago when he had a laparoscopic umbilical hernia repair. He states pain is constant and worsened in sitting position or when laying on his side. Admits to vomiting once since he's been here. Passing flatus and admits to diarrhea. No fevers/chills. Denies trauma to that area.      Allergies: aspirin (Short breath)  aspirin (Swelling)  penicillin (Short breath)  penicillins (Swelling)  shellfish (Unknown)      Vitals: T(F): 97.7 (09-19-19 @ 14:50), Max: 98.9 (09-19-19 @ 05:02)  HR: 80 (09-19-19 @ 14:50) (77 - 94)  BP: 136/81 (09-19-19 @ 14:50) (134/77 - 163/67)  RR: 18 (09-19-19 @ 14:50) (18 - 18)  SpO2: 98% (09-19-19 @ 05:02) (98% - 100%)      Labs:                         8.9    7.59  )-----------( 235      ( 19 Sep 2019 14:56 )             30.9   09-19    135  |  98  |  48<H>  ----------------------------<  123<H>  5.2   |  28  |  8.90<H>    Ca    9.4      19 Sep 2019 14:56    TPro  7.4  /  Alb  3.0<L>  /  TBili  0.6  /  DBili  x   /  AST  14  /  ALT  19  /  AlkPhos  159<H>  09-19    Physical Exam:  General: Morbidly obese, sitting up in bed, AAO x 3, No acute distress  Resp: Nonlabored respirations, good inspiratory effort  Abdomen: Obese, soft, very tender below umbilicus, no palpable mass appreciated, lower abd skin feels indurated, no erythema appreciated. No signs of infection appreciated.    Extremities:    Imaging:  < from: CT Abdomen and Pelvis w/ IV Cont (09.18.19 @ 04:35) >    EXAM:  CT ABDOMEN AND PELVIS IC                            PROCEDURE DATE:  09/18/2019          INTERPRETATION:  CLINICAL INFORMATION: Ventral abdominal pain    COMPARISON: 2/21/2019    PROCEDURE:   CT of the Abdomen and Pelvis was performed with intravenous contrast.   Intravenous contrast: 125 ml Omnipaque 350. 75 ml discarded.  Oral contrast: None.  Sagittal and coronal reformats were performed.    FINDINGS:    Evaluation is degraded by body habitus. Portions of the left ventral   abdominal wall are excluded from the images. There is regional beam   hardening artifact.    LOWER CHEST: Mild bibasilar dependent and platelike atelectasis.   Cardiomegaly. Small pericardial effusion.    LIVER: Hepatic steatosis. Mild heterogeneity of the liver.  BILE DUCTS: Normal caliber.  GALLBLADDER: Normally distended. No calcified gallstones. Nonspecific   pericholecystic fluid.  SPLEEN: Within normal limits.  PANCREAS: Pancreas enhances relatively homogeneously. Small volume of   fluid near the pancreatic head and adjacent to the gallbladder.  ADRENALS: Within normal limits.  KIDNEYS/URETERS: Mildly atrophic. Enhance symmetrically. No right or left   hydronephrosis.    BLADDER: Underdistended.  REPRODUCTIVE ORGANS: Prostate gland and seminal vesicles are unremarkable.    BOWEL: No bowel obstruction. Appendix is normal.  PERITONEUM: Small volume of abdominal and pelvic ascites. No   pneumoperitoneum.  VESSELS: Normal caliber abdominal aorta.  RETROPERITONEUM/LYMPH NODES: Scattered nonspecificsubcentimeter   retroperitoneal lymph nodes.    ABDOMINAL WALL: No obvious ventral abdominal wall hernia.  BONES: Mild degenerative changes of the spine.    IMPRESSION:    Examination degraded by body habitus and resultant beam hardening   artifact.    No bowel obstruction.    Small volume of abdominal and pelvic ascites, as well as, fluid around   the gallbladder and near the pancreatic head. Correlate with LFTs and   amylase/lipase. Sonographic evaluation may be considered, as clinically   indicated.    EMMY FERNANDEZ D.O. ATTENDING RADIOLOGIST  This document has been electronically signed. Sep 18 2019  4:55AM    < end of copied text >

## 2019-09-20 ENCOUNTER — TRANSCRIPTION ENCOUNTER (OUTPATIENT)
Age: 39
End: 2019-09-20

## 2019-09-20 VITALS
RESPIRATION RATE: 18 BRPM | HEART RATE: 87 BPM | DIASTOLIC BLOOD PRESSURE: 82 MMHG | OXYGEN SATURATION: 99 % | SYSTOLIC BLOOD PRESSURE: 144 MMHG

## 2019-09-20 DIAGNOSIS — M79.661 PAIN IN RIGHT LOWER LEG: ICD-10-CM

## 2019-09-20 LAB
ANION GAP SERPL CALC-SCNC: 9 MMOL/L — SIGNIFICANT CHANGE UP (ref 5–17)
BUN SERPL-MCNC: 56 MG/DL — HIGH (ref 7–18)
CALCIUM SERPL-MCNC: 9.1 MG/DL — SIGNIFICANT CHANGE UP (ref 8.4–10.5)
CHLORIDE SERPL-SCNC: 95 MMOL/L — LOW (ref 96–108)
CO2 SERPL-SCNC: 28 MMOL/L — SIGNIFICANT CHANGE UP (ref 22–31)
CREAT SERPL-MCNC: 9.83 MG/DL — HIGH (ref 0.5–1.3)
GLUCOSE BLDC GLUCOMTR-MCNC: 111 MG/DL — HIGH (ref 70–99)
GLUCOSE BLDC GLUCOMTR-MCNC: 111 MG/DL — HIGH (ref 70–99)
GLUCOSE BLDC GLUCOMTR-MCNC: 87 MG/DL — SIGNIFICANT CHANGE UP (ref 70–99)
GLUCOSE SERPL-MCNC: 94 MG/DL — SIGNIFICANT CHANGE UP (ref 70–99)
HCT VFR BLD CALC: 32.2 % — LOW (ref 39–50)
HGB BLD-MCNC: 9.3 G/DL — LOW (ref 13–17)
MAGNESIUM SERPL-MCNC: 2.4 MG/DL — SIGNIFICANT CHANGE UP (ref 1.6–2.6)
MCHC RBC-ENTMCNC: 27 PG — SIGNIFICANT CHANGE UP (ref 27–34)
MCHC RBC-ENTMCNC: 28.9 GM/DL — LOW (ref 32–36)
MCV RBC AUTO: 93.3 FL — SIGNIFICANT CHANGE UP (ref 80–100)
NRBC # BLD: 0 /100 WBCS — SIGNIFICANT CHANGE UP (ref 0–0)
PHOSPHATE SERPL-MCNC: 7.6 MG/DL — HIGH (ref 2.5–4.5)
PLATELET # BLD AUTO: 222 K/UL — SIGNIFICANT CHANGE UP (ref 150–400)
POTASSIUM SERPL-MCNC: 5.9 MMOL/L — HIGH (ref 3.5–5.3)
POTASSIUM SERPL-SCNC: 5.9 MMOL/L — HIGH (ref 3.5–5.3)
RBC # BLD: 3.45 M/UL — LOW (ref 4.2–5.8)
RBC # FLD: 18.2 % — HIGH (ref 10.3–14.5)
SODIUM SERPL-SCNC: 132 MMOL/L — LOW (ref 135–145)
WBC # BLD: 8.05 K/UL — SIGNIFICANT CHANGE UP (ref 3.8–10.5)
WBC # FLD AUTO: 8.05 K/UL — SIGNIFICANT CHANGE UP (ref 3.8–10.5)

## 2019-09-20 PROCEDURE — 99222 1ST HOSP IP/OBS MODERATE 55: CPT

## 2019-09-20 RX ORDER — PANTOPRAZOLE SODIUM 20 MG/1
1 TABLET, DELAYED RELEASE ORAL
Qty: 0 | Refills: 0 | DISCHARGE

## 2019-09-20 RX ORDER — PANTOPRAZOLE SODIUM 20 MG/1
1 TABLET, DELAYED RELEASE ORAL
Qty: 0 | Refills: 0 | DISCHARGE
Start: 2019-09-20

## 2019-09-20 RX ORDER — TRAMADOL HYDROCHLORIDE 50 MG/1
1 TABLET ORAL
Qty: 28 | Refills: 0
Start: 2019-09-20 | End: 2019-09-26

## 2019-09-20 RX ORDER — SENNA PLUS 8.6 MG/1
2 TABLET ORAL AT BEDTIME
Refills: 0 | Status: DISCONTINUED | OUTPATIENT
Start: 2019-09-20 | End: 2019-09-20

## 2019-09-20 RX ADMIN — Medication 1 MILLIGRAM(S): at 14:16

## 2019-09-20 RX ADMIN — TRAMADOL HYDROCHLORIDE 50 MILLIGRAM(S): 50 TABLET ORAL at 17:30

## 2019-09-20 RX ADMIN — ERYTHROPOIETIN 4000 UNIT(S): 10000 INJECTION, SOLUTION INTRAVENOUS; SUBCUTANEOUS at 16:52

## 2019-09-20 RX ADMIN — Medication 25 MILLIGRAM(S): at 05:33

## 2019-09-20 RX ADMIN — SEVELAMER CARBONATE 800 MILLIGRAM(S): 2400 POWDER, FOR SUSPENSION ORAL at 08:25

## 2019-09-20 RX ADMIN — CLOPIDOGREL BISULFATE 75 MILLIGRAM(S): 75 TABLET, FILM COATED ORAL at 14:16

## 2019-09-20 RX ADMIN — SEVELAMER CARBONATE 800 MILLIGRAM(S): 2400 POWDER, FOR SUSPENSION ORAL at 17:29

## 2019-09-20 RX ADMIN — Medication 25 MILLIGRAM(S): at 17:29

## 2019-09-20 RX ADMIN — Medication 325 MILLIGRAM(S): at 14:16

## 2019-09-20 RX ADMIN — HEPARIN SODIUM 5000 UNIT(S): 5000 INJECTION INTRAVENOUS; SUBCUTANEOUS at 05:32

## 2019-09-20 RX ADMIN — Medication 50 MILLIGRAM(S): at 05:33

## 2019-09-20 RX ADMIN — Medication 50 MILLIGRAM(S): at 17:29

## 2019-09-20 RX ADMIN — Medication 10 MILLIGRAM(S): at 14:16

## 2019-09-20 RX ADMIN — Medication 80 MILLIGRAM(S): at 05:33

## 2019-09-20 RX ADMIN — SEVELAMER CARBONATE 800 MILLIGRAM(S): 2400 POWDER, FOR SUSPENSION ORAL at 13:25

## 2019-09-20 RX ADMIN — Medication 500 MILLIGRAM(S): at 14:16

## 2019-09-20 RX ADMIN — TRAMADOL HYDROCHLORIDE 50 MILLIGRAM(S): 50 TABLET ORAL at 06:56

## 2019-09-20 RX ADMIN — TRAMADOL HYDROCHLORIDE 50 MILLIGRAM(S): 50 TABLET ORAL at 05:31

## 2019-09-20 RX ADMIN — PANTOPRAZOLE SODIUM 40 MILLIGRAM(S): 20 TABLET, DELAYED RELEASE ORAL at 05:33

## 2019-09-20 RX ADMIN — HEPARIN SODIUM 5000 UNIT(S): 5000 INJECTION INTRAVENOUS; SUBCUTANEOUS at 17:29

## 2019-09-20 RX ADMIN — AMLODIPINE BESYLATE 5 MILLIGRAM(S): 2.5 TABLET ORAL at 05:34

## 2019-09-20 NOTE — PROGRESS NOTE ADULT - SUBJECTIVE AND OBJECTIVE BOX
Subjective:   + SOB, + abdominal pain     Objective:    MEDICATIONS  (STANDING):  amLODIPine   Tablet 5 milliGRAM(s) Oral daily  ascorbic acid 500 milliGRAM(s) Oral daily  chlorhexidine 2% Cloths 1 Application(s) Topical daily  clopidogrel Tablet 75 milliGRAM(s) Oral daily  epoetin cyndie Injectable 4000 Unit(s) IV Push <User Schedule>  ferrous    sulfate 325 milliGRAM(s) Oral daily  folic acid 1 milliGRAM(s) Oral daily  furosemide    Tablet 80 milliGRAM(s) Oral daily  gabapentin 300 milliGRAM(s) Oral at bedtime  heparin  Injectable 5000 Unit(s) SubCutaneous every 12 hours  hydrALAZINE 50 milliGRAM(s) Oral three times a day  insulin glargine Injectable (LANTUS) 20 Unit(s) SubCutaneous at bedtime  insulin lispro (HumaLOG) corrective regimen sliding scale   SubCutaneous three times a day before meals  insulin lispro (HumaLOG) corrective regimen sliding scale   SubCutaneous at bedtime  metoprolol tartrate 25 milliGRAM(s) Oral two times a day  mirtazapine 7.5 milliGRAM(s) Oral at bedtime  pantoprazole    Tablet 40 milliGRAM(s) Oral before breakfast  PARoxetine 10 milliGRAM(s) Oral daily  risperiDONE   Tablet 1 milliGRAM(s) Oral at bedtime  sevelamer carbonate 800 milliGRAM(s) Oral three times a day with meals  simvastatin 40 milliGRAM(s) Oral at bedtime    MEDICATIONS  (PRN):  acetaminophen   Tablet .. 650 milliGRAM(s) Oral every 6 hours PRN Temp greater or equal to 38C (100.4F), Mild Pain (1 - 3), Moderate Pain (4 - 6)  methocarbamol 500 milliGRAM(s) Oral every 12 hours PRN spasms  traMADol 50 milliGRAM(s) Oral every 6 hours PRN Severe Pain (7 - 10)              Vital Signs Last 24 Hrs  T(C): 36.7 (20 Sep 2019 05:09), Max: 37 (19 Sep 2019 21:28)  T(F): 98 (20 Sep 2019 05:09), Max: 98.6 (19 Sep 2019 21:28)  HR: 87 (20 Sep 2019 05:09) (67 - 87)  BP: 134/88 (20 Sep 2019 05:09) (126/79 - 144/72)  BP(mean): --  RR: 17 (20 Sep 2019 05:09) (17 - 18)  SpO2: 99% (20 Sep 2019 05:09) (99% - 100%)      General:  , NAD.  HEENT:    Normal appearance of conjunctiva, ears, nose, lips, oropharynx, and oral mucosa, anicteric.  Cardiovascular:  RRR normal S1/S2, no murmur.  Respiratory:  CTA B/L, normal respiratory effort.   Abdominal:   soft, no masses or tenderness, normoactive BS, NT/ND, no HSM.  Extremities:   No clubbing or cyanosis, normal capillary refill, no edema.   Skin:   No rash, jaundice, lesions, eczema.     Other:       LABS:                        9.3    8.05  )-----------( 222      ( 20 Sep 2019 06:06 )             32.2     09-20    132<L>  |  95<L>  |  56<H>  ----------------------------<  94  5.9<H>   |  28  |  9.83<H>    Ca    9.1      20 Sep 2019 06:06  Phos  7.6     09-20  Mg     2.4     09-20    TPro  7.4  /  Alb  3.0<L>  /  TBili  0.6  /  DBili  x   /  AST  14  /  ALT  19  /  AlkPhos  159<H>  09-19          RADIOLOGY & ADDITIONAL TESTS:

## 2019-09-20 NOTE — DISCHARGE NOTE PROVIDER - NSDCFUSCHEDAPPT_GEN_ALL_CORE_FT
PRESTON JOHNSON ; 10/01/2019 ; [facility] [address] PRE[facility] ; 10/16/2019 ; [facility] [address]

## 2019-09-20 NOTE — PROGRESS NOTE ADULT - SUBJECTIVE AND OBJECTIVE BOX
[facility] : Progress Note :: [phone], (office [phone]),   Dr La / Dr Hui / Dr Barber / Dr Villalobos / Dr Hang CASTELLANO / Dr Mehta / Dr Sanchez / Dr Alber dumont  _____________________________________________________________________________________________    seen on HD     aspirin (Short breath)  aspirin (Swelling)  penicillin (Short breath)  penicillins (Swelling)  shellfish (Unknown)    Hospital Medications:   MEDICATIONS  (STANDING):  amLODIPine   Tablet 5 milliGRAM(s) Oral daily  ascorbic acid 500 milliGRAM(s) Oral daily  chlorhexidine 2% Cloths 1 Application(s) Topical daily  clopidogrel Tablet 75 milliGRAM(s) Oral daily  epoetin cyndie Injectable 4000 Unit(s) IV Push <User Schedule>  ferrous    sulfate 325 milliGRAM(s) Oral daily  folic acid 1 milliGRAM(s) Oral daily  furosemide    Tablet 80 milliGRAM(s) Oral daily  gabapentin 300 milliGRAM(s) Oral at bedtime  heparin  Injectable 5000 Unit(s) SubCutaneous every 12 hours  hydrALAZINE 50 milliGRAM(s) Oral three times a day  insulin glargine Injectable (LANTUS) 20 Unit(s) SubCutaneous at bedtime  insulin lispro (HumaLOG) corrective regimen sliding scale   SubCutaneous three times a day before meals  insulin lispro (HumaLOG) corrective regimen sliding scale   SubCutaneous at bedtime  metoprolol tartrate 25 milliGRAM(s) Oral two times a day  mirtazapine 7.5 milliGRAM(s) Oral at bedtime  pantoprazole    Tablet 40 milliGRAM(s) Oral before breakfast  PARoxetine 10 milliGRAM(s) Oral daily  risperiDONE   Tablet 1 milliGRAM(s) Oral at bedtime  sevelamer carbonate 800 milliGRAM(s) Oral three times a day with meals  simvastatin 40 milliGRAM(s) Oral at bedtime        VITALS:  T(F): 98 (09-20-19 @ 05:09), Max: 98.6 (09-19-19 @ 21:28)  HR: 87 (09-20-19 @ 05:09)  BP: 134/88 (09-20-19 @ 05:09)  RR: 17 (09-20-19 @ 05:09)  SpO2: 99% (09-20-19 @ 05:09)  Wt(kg): --      PHYSICAL EXAM:  Constitutional: NAD  HEENT: anicteric sclera, oropharynx clear.  Neck: No JVD  Respiratory: CTAB, no wheezes, rales or rhonchi  Cardiovascular: S1, S2, RRR  Gastrointestinal: BS+, soft, abdominal wall edema  Extremities:  peripheral edema++  Neurological: A/O x 3, no focal deficits  : No CVA tenderness. No hernandez.   Skin: No rashes  Vascular Access: AVF canulated    LABS:  09-20    132<L>  |  95<L>  |  56<H>  ----------------------------<  94  5.9<H>   |  28  |  9.83<H>    Ca    9.1      20 Sep 2019 06:06  Phos  7.6     09-20  Mg     2.4     09-20    TPro  7.4  /  Alb  3.0<L>  /  TBili  0.6  /  DBili      /  AST  14  /  ALT  19  /  AlkPhos  159<H>  09-19    Creatinine Trend: 9.83 <--, 8.90 <--, 9.56 <--                        9.3    8.05  )-----------( 222      ( 20 Sep 2019 06:06 )             32.2     Urine Studies:      RADIOLOGY & ADDITIONAL STUDIES:

## 2019-09-20 NOTE — CONSULT NOTE ADULT - REASON FOR ADMISSION
shortness of breath and abdominal pain.

## 2019-09-20 NOTE — DISCHARGE NOTE PROVIDER - CARE PROVIDER_API CALL
Remy Winston)  Gastroenterology; Internal Medicine  [address] [place] [id number]  Phone: [phone]  Fax: [phone]  Follow Up Time:

## 2019-09-20 NOTE — PROGRESS NOTE ADULT - REASON FOR ADMISSION
shortness of breath and abdominal pain.

## 2019-09-20 NOTE — PROGRESS NOTE ADULT - ASSESSMENT
Patient is a 39y Male with ESRD on HD Tuesday, Thursday and Friday at [facility]. Presented to ED overnight with SOB and abdominal pain. CT abdomen was unremarkable for any obstruction. CXR revealed pulmonary edema. Renal consulted overnight for emergent HD.  still complaining of joshau-umbilical pain.    # ESRD presented with pulmonary edema. s/p HD with yesterday . HD today as well with UF   # anemia of CKD. cont procrit  # abdominal pain. CT noted to be negative.   # HTN controlled.  # CKDMBD. cont sevelamer  D/C planning

## 2019-09-20 NOTE — CHART NOTE - NSCHARTNOTEFT_GEN_A_CORE
D/c summary faxed to [facility] ([facility])  at . Confirmed receipt of d/c summary with Nicole via telephone . Unit nurse , Zo notified. As per Zo, Pt is back from dialysis and he's awaiting transportation to home.

## 2019-09-20 NOTE — CONSULT NOTE ADULT - SUBJECTIVE AND OBJECTIVE BOX
Chief Complaint:    HPI:  39 years old male from home with PMHx of ESRD on MWF HD (via LUE AVF), DM (on insulin), cocaine abuse, on home O2 (4L NC), morbid obesity, Bipolar disorder, schizophrenia and HTN presents to the ED with chief complaint of shortness of breath x2 days and worsening LE edema likely from fluid overload in setting of ESRD. per patient he completed HD on 09/16/19. Patient is also complaining of abdominal pain in umbilical region. Which is non-radiating and intermittent in nature. Patient has multiple recent addmisson in last few months for fluid overload needing urgent dialysis.   Pt denies any chest pain, palpitations, nausea, vomiting, change in bowel or urinary habits (18 Sep 2019 09:36)      Pain Level:   Scale: VAS    PAST MEDICAL & SURGICAL HISTORY:  Migraine  HTN (hypertension)  DM (diabetes mellitus)  Bipolar disorder  Schizophrenia  ESRD on dialysis  Morbid obesity with BMI of 40.0-44.9, adult  H/O hernia repair      FAMILY HISTORY:  Family history of COPD (chronic obstructive pulmonary disease)  Family history of diabetes mellitus      SOCIAL HISTORY:  [ ] Denies Smoking, Alcohol, or Drug Use    Allergies    aspirin (Short breath)  aspirin (Swelling)  penicillin (Short breath)  penicillins (Swelling)  shellfish (Unknown)    Intolerances        PAIN MEDICATIONS:  acetaminophen   Tablet .. 650 milliGRAM(s) Oral every 6 hours PRN  gabapentin 300 milliGRAM(s) Oral at bedtime  methocarbamol 500 milliGRAM(s) Oral every 12 hours PRN  mirtazapine 7.5 milliGRAM(s) Oral at bedtime  PARoxetine 10 milliGRAM(s) Oral daily  risperiDONE   Tablet 1 milliGRAM(s) Oral at bedtime  traMADol 50 milliGRAM(s) Oral every 6 hours PRN      Heme:  clopidogrel Tablet 75 milliGRAM(s) Oral daily  heparin  Injectable 5000 Unit(s) SubCutaneous every 12 hours    Antibiotics:    Cardiovascular:  amLODIPine   Tablet 5 milliGRAM(s) Oral daily  furosemide    Tablet 80 milliGRAM(s) Oral daily  hydrALAZINE 50 milliGRAM(s) Oral three times a day  metoprolol tartrate 25 milliGRAM(s) Oral two times a day    GI:  pantoprazole    Tablet 40 milliGRAM(s) Oral before breakfast    Endocrine:  insulin glargine Injectable (LANTUS) 20 Unit(s) SubCutaneous at bedtime  insulin lispro (HumaLOG) corrective regimen sliding scale   SubCutaneous three times a day before meals  insulin lispro (HumaLOG) corrective regimen sliding scale   SubCutaneous at bedtime  simvastatin 40 milliGRAM(s) Oral at bedtime    All Other Medications:  ascorbic acid 500 milliGRAM(s) Oral daily  chlorhexidine 2% Cloths 1 Application(s) Topical daily  epoetin cyndie Injectable 4000 Unit(s) IV Push <User Schedule>  ferrous    sulfate 325 milliGRAM(s) Oral daily  folic acid 1 milliGRAM(s) Oral daily  sevelamer carbonate 800 milliGRAM(s) Oral three times a day with meals      REVIEW OF SYSTEMS:    CONSTITUTIONAL: No fever, weight loss, or fatigue  RESPIRATORY: No cough, wheezing, chills, or hemoptysis, No SOB  NECK: No neck pain  CARDIOVASCULAR: No chest pain, palpitations, dizziness, or leg swelling  GASTROINTESTINAL: No abdominal or epigastric pain.  No nausea, vomiting, or hematemesis.  No diarrhea. + constipation.  GENITOURINARY: No dysuria, frequency, hematuria or incontinence  NEUROLOGICAL: No headaches, memory loss, loss of strength, numbness or tremors  MUSCULOSKELETAL: No joint pain or swelling, No muscle or back pain    Vital Signs Last 24 Hrs  T(C): 36.7 (20 Sep 2019 05:09), Max: 37 (19 Sep 2019 21:28)  T(F): 98 (20 Sep 2019 05:09), Max: 98.6 (19 Sep 2019 21:28)  HR: 87 (20 Sep 2019 05:09) (67 - 87)  BP: 134/88 (20 Sep 2019 05:09) (126/79 - 144/72)  BP(mean): --  RR: 17 (20 Sep 2019 05:09) (17 - 18)  SpO2: 99% (20 Sep 2019 05:09) (99% - 100%)    PAIN SCORE:         SCALE USED: (1-10 VNRS)    PHYSICAL EXAM:    GENERAL: NAD, well-groomed, well-developed   NERVOUS SYSTEM: Alert and oriented x3, Motor strength 5/5 B/L upper and lower extremities, SLR -   CHEST/LUNG: Clear to percussion bilaterally, no rale, rhonchi or wheezing  HEART: Regular rate and rhythm, No murmurs, rubs, or gallops   ABDOMEN: Soft, nontender, nondistended, Bowel sounds present  BACK: No CVA tenderness, No paravetebral tenderness  EXTREMITIES: +2 periperal extremities, no edema, cyanosis + decreased rom     LABS:                          9.3    8.05  )-----------( 222      ( 20 Sep 2019 06:06 )             32.2     09-20    132<L>  |  95<L>  |  56<H>  ----------------------------<  94  5.9<H>   |  28  |  9.83<H>    Ca    9.1      20 Sep 2019 06:06  Phos  7.6     09-20  Mg     2.4     09-20    TPro  7.4  /  Alb  3.0<L>  /  TBili  0.6  /  DBili  x   /  AST  14  /  ALT  19  /  AlkPhos  159<H>  09-19          RADIOLOGY:    Drug Screen:        RECOMMENDATIONS    [ ]  [place]  Reviewed and Copied to Chart Chief Complaint: bilateral leg pain    HPI:  39 years old male from home with PMHx of ESRD on MWF HD (via LUE AVF), DM (on insulin), cocaine abuse, on home O2 (4L NC), morbid obesity, Bipolar disorder, schizophrenia and HTN presents to the ED with chief complaint of shortness of breath x2 days and worsening LE edema likely from fluid overload in setting of ESRD. Per patient he completed HD on 09/16/19. Patient is also complaining of abdominal pain in umbilical region. Which is non-radiating and intermittent in nature. Patient has multiple recent admission in last few months for fluid overload needing urgent dialysis.   Pt denies any chest pain, palpitations, nausea, vomiting, change in bowel or urinary habits (18 Sep 2019 09:36).    39 year old male with esrd, le edema, dm, morbid obesity, complaints of le pain.  Pt with le edema.  + pain - bilateral legs. Pt undergoing hd several times this week.      PAST MEDICAL & SURGICAL HISTORY:  Migraine  HTN (hypertension)  DM (diabetes mellitus)  Bipolar disorder  Schizophrenia  ESRD on dialysis  Morbid obesity with BMI of 40.0-44.9, adult  H/O hernia repair      FAMILY HISTORY:  Family history of COPD (chronic obstructive pulmonary disease)  Family history of diabetes mellitus      SOCIAL HISTORY:  [x] Denies Smoking, Alcohol, or Drug Use    Allergies    aspirin (Short breath)  aspirin (Swelling)  penicillin (Short breath)  penicillins (Swelling)  shellfish (Unknown)    Intolerances        PAIN MEDICATIONS:  acetaminophen   Tablet .. 650 milliGRAM(s) Oral every 6 hours PRN  gabapentin 300 milliGRAM(s) Oral at bedtime  methocarbamol 500 milliGRAM(s) Oral every 12 hours PRN  mirtazapine 7.5 milliGRAM(s) Oral at bedtime  PARoxetine 10 milliGRAM(s) Oral daily  risperiDONE   Tablet 1 milliGRAM(s) Oral at bedtime  traMADol 50 milliGRAM(s) Oral every 6 hours PRN      Heme:  clopidogrel Tablet 75 milliGRAM(s) Oral daily  heparin  Injectable 5000 Unit(s) SubCutaneous every 12 hours    Antibiotics:    Cardiovascular:  amLODIPine   Tablet 5 milliGRAM(s) Oral daily  furosemide    Tablet 80 milliGRAM(s) Oral daily  hydrALAZINE 50 milliGRAM(s) Oral three times a day  metoprolol tartrate 25 milliGRAM(s) Oral two times a day    GI:  pantoprazole    Tablet 40 milliGRAM(s) Oral before breakfast    Endocrine:  insulin glargine Injectable (LANTUS) 20 Unit(s) SubCutaneous at bedtime  insulin lispro (HumaLOG) corrective regimen sliding scale   SubCutaneous three times a day before meals  insulin lispro (HumaLOG) corrective regimen sliding scale   SubCutaneous at bedtime  simvastatin 40 milliGRAM(s) Oral at bedtime    All Other Medications:  ascorbic acid 500 milliGRAM(s) Oral daily  chlorhexidine 2% Cloths 1 Application(s) Topical daily  epoetin cyndie Injectable 4000 Unit(s) IV Push <User Schedule>  ferrous    sulfate 325 milliGRAM(s) Oral daily  folic acid 1 milliGRAM(s) Oral daily  sevelamer carbonate 800 milliGRAM(s) Oral three times a day with meals      REVIEW OF SYSTEMS:    CONSTITUTIONAL: No fever, weight loss, or fatigue  RESPIRATORY: No cough, wheezing, chills, or hemoptysis, No SOB  NECK: No neck pain  CARDIOVASCULAR: No chest pain, palpitations, dizziness, or leg swelling  GASTROINTESTINAL: No abdominal or epigastric pain.  No nausea, vomiting, or hematemesis.  No diarrhea. + constipation.  GENITOURINARY: No dysuria, frequency, hematuria or incontinence  NEUROLOGICAL: No headaches, memory loss, loss of strength, numbness or tremors  MUSCULOSKELETAL: + bilateral leg pain + bilateral leg edema    Vital Signs Last 24 Hrs  T(C): 36.7 (20 Sep 2019 05:09), Max: 37 (19 Sep 2019 21:28)  T(F): 98 (20 Sep 2019 05:09), Max: 98.6 (19 Sep 2019 21:28)  HR: 87 (20 Sep 2019 05:09) (67 - 87)  BP: 134/88 (20 Sep 2019 05:09) (126/79 - 144/72)  BP(mean): --  RR: 17 (20 Sep 2019 05:09) (17 - 18)  SpO2: 99% (20 Sep 2019 05:09) (99% - 100%)    PAIN SCORE:     5/10    SCALE USED: (1-10 VNRS)    PHYSICAL EXAM:    GENERAL: NAD, obese   NERVOUS SYS:   decreased breath sounds + crackles bases   HEART: Regular rate and rhythm, No murmurs, rubs, or gallops   ABDOMEN: obese, tender - lower abd,  Bowel sounds present,   BACK: No CVA tenderness, No paravertebral tenderness   EXTREMITIES: +2 peripheral extremities, no edema, cyanosis + decreased rom + edema - bilateral legs,     LABS:                          9.3    8.05  )-----------( 222      ( 20 Sep 2019 06:06 )             32.2     09-20    132<L>  |  95<L>  |  56<H>  ----------------------------<  94  5.9<H>   |  28  |  9.83<H>    Ca    9.1      20 Sep 2019 06:06  Phos  7.6     09-20  Mg     2.4     09-20    TPro  7.4  /  Alb  3.0<L>  /  TBili  0.6  /  DBili  x   /  AST  14  /  ALT  19  /  AlkPhos  159<H>  09-19          RADIOLOGY:      Drug Screen:        RECOMMENDATIONS    [ ]  [place]  Reviewed and Copied to Chart

## 2019-09-20 NOTE — DISCHARGE NOTE PROVIDER - NSDCCPCAREPLAN_GEN_ALL_CORE_FT
PRINCIPAL DISCHARGE DIAGNOSIS  Diagnosis: Dyspnea  Assessment and Plan of Treatment: You presented with shortness of breath and worsening edema. We started you on urgent dialysis and you started improving. You should continue with your scheduled dialysis and continue with your medications.      SECONDARY DISCHARGE DIAGNOSES  Diagnosis: ESRD on dialysis  Assessment and Plan of Treatment: We gave you dialysis while you were in patient. Your brething improved and you were stable to be discharged.   Continue your dialysis as intructed by your Nephrologist. Dialysis will be reinstated upon your discharge.  Please mantain a diet adequate for you condition  Continue dialysis on your given days.      Diagnosis: Abdominal pain  Assessment and Plan of Treatment: CT abdomen is negative for any obstructive component.  Reported pain is out of proportion to physical exam finding. we ordered IV tylenol and tramadol. surgery and gasteroenterology was consulted and they didnt think any intervention was needed. Please follow up with the doctor as out patient after discharge    Diagnosis: Schizophrenia  Assessment and Plan of Treatment: Continue taking all your home medications

## 2019-09-20 NOTE — PROGRESS NOTE ADULT - ASSESSMENT
39 year old male with ESRD on HD complains of abdominal pain    Unclear etiology of pain   Continue dialysis as per primary team   Tylenol for pain control   pain management   Will continue to follow   Advanced care planning was discussed with patient and family.  Advanced care planning forms were reviewed and discussed.  Risks, benefits and alternatives of gastroenterologic procedures were discussed in detail and all questions were answered.

## 2019-09-20 NOTE — DISCHARGE NOTE PROVIDER - HOSPITAL COURSE
39 years old male from home with PMHx of ESRD on MWF HD (via LUE AVF), DM (on insulin), cocaine abuse, on home O2 (4L NC), morbid obesity, Bipolar disorder, schizophrenia and HTN presents to the ED with chief complaint of shortness of breath x2 days and worsening LE edema likely from fluid overload in setting of ESRD. per patient he completed HD on 09/16/19. Patient is also complaining of abdominal pain in umbilical region. Which is non-radiating and intermittent in nature. Patient has multiple recent addmisson in last few months for fluid overload needing urgent dialysis.     Pt denies any chest pain, palpitations, nausea, vomiting, change in bowel or urinary habits     Pt got the dialysis urgent. 39 years old male from home with PMHx of ESRD on MWF HD (via LUE AVF), DM (on insulin), cocaine abuse, on home O2 (4L NC), morbid obesity, Bipolar disorder, schizophrenia and HTN presents to the ED with chief complaint of shortness of breath x2 days and worsening LE edema likely from fluid overload in setting of ESRD. per patient he completed HD on 09/16/19. Patient is also complaining of abdominal pain in umbilical region. Which is non-radiating and intermittent in nature. Patient has multiple recent addmisson in last few months for fluid overload needing urgent dialysis.     Pt denies any chest pain, palpitations, nausea, vomiting, change in bowel or urinary habits     patient is admitted with fluid overload - s/p dialysis yesterday  and with another dialysis today 09/19     pt was complaining of abdominal pain in umbilical region.    Slight tenderness on exam. Patient has hernia there.    CT abdomen is negative for any obstructive component.    Reported pain is out of proportion to physical exam finding.    ordered IV tylenol and tramadol.    pain management consult because of suspected opiod abuse.    surgery consulted for abdominal pain and CT finding concerning for ascites - no interventions were recommended     Pt received HD on the day of discharge and was stable to go home         PLease look at medical charts for more details as this is a brief HC.

## 2019-09-20 NOTE — DISCHARGE NOTE NURSING/CASE MANAGEMENT/SOCIAL WORK - PATIENT PORTAL LINK FT
You can access the FollowMyHealth Patient Portal offered by [facility] by registering at the following website: [email or web address]. By joining [employer]’s FollowMyHealth portal, you will also be able to view your health information using other applications (apps) compatible with our system.

## 2019-09-20 NOTE — PROGRESS NOTE ADULT - PROVIDER SPECIALTY LIST ADULT
Gastroenterology
Internal Medicine
Nephrology
Nephrology

## 2019-09-20 NOTE — CONSULT NOTE ADULT - PROBLEM SELECTOR RECOMMENDATION 9
- gabapentin 300mg po q hs  - tramadol 50mg po q  6 hours prn  - Robaxin po prn  - no nsaids  - no iv opioids  - pt to continue HD as needed  - stool softeners

## 2019-09-23 ENCOUNTER — INPATIENT (INPATIENT)
Facility: HOSPITAL | Age: 39
LOS: 2 days | Discharge: HOME | DRG: 189 | End: 2019-09-25
Attending: INTERNAL MEDICINE | Admitting: INTERNAL MEDICINE
Payer: MEDICARE

## 2019-09-23 VITALS
RESPIRATION RATE: 18 BRPM | OXYGEN SATURATION: 100 % | TEMPERATURE: 99 F | SYSTOLIC BLOOD PRESSURE: 150 MMHG | DIASTOLIC BLOOD PRESSURE: 72 MMHG | HEART RATE: 96 BPM

## 2019-09-23 DIAGNOSIS — Z29.9 ENCOUNTER FOR PROPHYLACTIC MEASURES, UNSPECIFIED: ICD-10-CM

## 2019-09-23 DIAGNOSIS — F20.9 SCHIZOPHRENIA, UNSPECIFIED: ICD-10-CM

## 2019-09-23 DIAGNOSIS — F31.9 BIPOLAR DISORDER, UNSPECIFIED: ICD-10-CM

## 2019-09-23 DIAGNOSIS — Z98.890 OTHER SPECIFIED POSTPROCEDURAL STATES: Chronic | ICD-10-CM

## 2019-09-23 DIAGNOSIS — E11.9 TYPE 2 DIABETES MELLITUS WITHOUT COMPLICATIONS: ICD-10-CM

## 2019-09-23 DIAGNOSIS — E87.5 HYPERKALEMIA: ICD-10-CM

## 2019-09-23 DIAGNOSIS — I10 ESSENTIAL (PRIMARY) HYPERTENSION: ICD-10-CM

## 2019-09-23 DIAGNOSIS — J81.0 ACUTE PULMONARY EDEMA: ICD-10-CM

## 2019-09-23 DIAGNOSIS — N18.6 END STAGE RENAL DISEASE: ICD-10-CM

## 2019-09-23 LAB
ANION GAP SERPL CALC-SCNC: 11 MMOL/L — SIGNIFICANT CHANGE UP (ref 5–17)
ANION GAP SERPL CALC-SCNC: 13 MMOL/L — SIGNIFICANT CHANGE UP (ref 5–17)
BASOPHILS # BLD AUTO: 0.02 K/UL — SIGNIFICANT CHANGE UP (ref 0–0.2)
BASOPHILS NFR BLD AUTO: 0.3 % — SIGNIFICANT CHANGE UP (ref 0–2)
BUN SERPL-MCNC: 84 MG/DL — HIGH (ref 7–18)
BUN SERPL-MCNC: 85 MG/DL — HIGH (ref 7–18)
CALCIUM SERPL-MCNC: 9.3 MG/DL — SIGNIFICANT CHANGE UP (ref 8.4–10.5)
CALCIUM SERPL-MCNC: 9.4 MG/DL — SIGNIFICANT CHANGE UP (ref 8.4–10.5)
CHLORIDE SERPL-SCNC: 95 MMOL/L — LOW (ref 96–108)
CHLORIDE SERPL-SCNC: 96 MMOL/L — SIGNIFICANT CHANGE UP (ref 96–108)
CO2 SERPL-SCNC: 26 MMOL/L — SIGNIFICANT CHANGE UP (ref 22–31)
CO2 SERPL-SCNC: 27 MMOL/L — SIGNIFICANT CHANGE UP (ref 22–31)
CREAT SERPL-MCNC: 12.2 MG/DL — HIGH (ref 0.5–1.3)
CREAT SERPL-MCNC: 12.3 MG/DL — HIGH (ref 0.5–1.3)
EOSINOPHIL # BLD AUTO: 0.54 K/UL — HIGH (ref 0–0.5)
EOSINOPHIL NFR BLD AUTO: 6.9 % — HIGH (ref 0–6)
GLUCOSE BLDC GLUCOMTR-MCNC: 137 MG/DL — HIGH (ref 70–99)
GLUCOSE BLDC GLUCOMTR-MCNC: 74 MG/DL — SIGNIFICANT CHANGE UP (ref 70–99)
GLUCOSE SERPL-MCNC: 74 MG/DL — SIGNIFICANT CHANGE UP (ref 70–99)
GLUCOSE SERPL-MCNC: 97 MG/DL — SIGNIFICANT CHANGE UP (ref 70–99)
HBV SURFACE AB SER-ACNC: REACTIVE
HBV SURFACE AG SER-ACNC: SIGNIFICANT CHANGE UP
HCT VFR BLD CALC: 30.9 % — LOW (ref 39–50)
HCV AB S/CO SERPL IA: 0.19 S/CO — SIGNIFICANT CHANGE UP (ref 0–0.99)
HCV AB SERPL-IMP: SIGNIFICANT CHANGE UP
HGB BLD-MCNC: 9.2 G/DL — LOW (ref 13–17)
IMM GRANULOCYTES NFR BLD AUTO: 0.4 % — SIGNIFICANT CHANGE UP (ref 0–1.5)
LYMPHOCYTES # BLD AUTO: 0.91 K/UL — LOW (ref 1–3.3)
LYMPHOCYTES # BLD AUTO: 11.6 % — LOW (ref 13–44)
MCHC RBC-ENTMCNC: 26.9 PG — LOW (ref 27–34)
MCHC RBC-ENTMCNC: 29.8 GM/DL — LOW (ref 32–36)
MCV RBC AUTO: 90.4 FL — SIGNIFICANT CHANGE UP (ref 80–100)
MONOCYTES # BLD AUTO: 1.14 K/UL — HIGH (ref 0–0.9)
MONOCYTES NFR BLD AUTO: 14.5 % — HIGH (ref 2–14)
NEUTROPHILS # BLD AUTO: 5.23 K/UL — SIGNIFICANT CHANGE UP (ref 1.8–7.4)
NEUTROPHILS NFR BLD AUTO: 66.3 % — SIGNIFICANT CHANGE UP (ref 43–77)
NRBC # BLD: 0 /100 WBCS — SIGNIFICANT CHANGE UP (ref 0–0)
PLATELET # BLD AUTO: 257 K/UL — SIGNIFICANT CHANGE UP (ref 150–400)
POTASSIUM SERPL-MCNC: 5.8 MMOL/L — HIGH (ref 3.5–5.3)
POTASSIUM SERPL-MCNC: 6.4 MMOL/L — CRITICAL HIGH (ref 3.5–5.3)
POTASSIUM SERPL-SCNC: 5.8 MMOL/L — HIGH (ref 3.5–5.3)
POTASSIUM SERPL-SCNC: 6.4 MMOL/L — CRITICAL HIGH (ref 3.5–5.3)
RBC # BLD: 3.42 M/UL — LOW (ref 4.2–5.8)
RBC # FLD: 18.5 % — HIGH (ref 10.3–14.5)
SODIUM SERPL-SCNC: 133 MMOL/L — LOW (ref 135–145)
SODIUM SERPL-SCNC: 135 MMOL/L — SIGNIFICANT CHANGE UP (ref 135–145)
TROPONIN I SERPL-MCNC: 0.02 NG/ML — SIGNIFICANT CHANGE UP (ref 0–0.04)
WBC # BLD: 7.87 K/UL — SIGNIFICANT CHANGE UP (ref 3.8–10.5)
WBC # FLD AUTO: 7.87 K/UL — SIGNIFICANT CHANGE UP (ref 3.8–10.5)

## 2019-09-23 PROCEDURE — 99223 1ST HOSP IP/OBS HIGH 75: CPT

## 2019-09-23 PROCEDURE — 99285 EMERGENCY DEPT VISIT HI MDM: CPT

## 2019-09-23 PROCEDURE — 71045 X-RAY EXAM CHEST 1 VIEW: CPT | Mod: 26

## 2019-09-23 RX ORDER — MORPHINE SULFATE 50 MG/1
2 CAPSULE, EXTENDED RELEASE ORAL ONCE
Refills: 0 | Status: DISCONTINUED | OUTPATIENT
Start: 2019-09-23 | End: 2019-09-23

## 2019-09-23 RX ORDER — SODIUM CHLORIDE 9 MG/ML
3 INJECTION INTRAMUSCULAR; INTRAVENOUS; SUBCUTANEOUS ONCE
Refills: 0 | Status: COMPLETED | OUTPATIENT
Start: 2019-09-23 | End: 2019-09-23

## 2019-09-23 RX ORDER — HYDRALAZINE HCL 50 MG
50 TABLET ORAL THREE TIMES A DAY
Refills: 0 | Status: DISCONTINUED | OUTPATIENT
Start: 2019-09-23 | End: 2019-09-25

## 2019-09-23 RX ORDER — FUROSEMIDE 40 MG
80 TABLET ORAL ONCE
Refills: 0 | Status: COMPLETED | OUTPATIENT
Start: 2019-09-23 | End: 2019-09-23

## 2019-09-23 RX ORDER — FERROUS SULFATE 325(65) MG
325 TABLET ORAL DAILY
Refills: 0 | Status: DISCONTINUED | OUTPATIENT
Start: 2019-09-23 | End: 2019-09-25

## 2019-09-23 RX ORDER — INSULIN HUMAN 100 [IU]/ML
10 INJECTION, SOLUTION SUBCUTANEOUS ONCE
Refills: 0 | Status: COMPLETED | OUTPATIENT
Start: 2019-09-23 | End: 2019-09-23

## 2019-09-23 RX ORDER — METOPROLOL TARTRATE 50 MG
25 TABLET ORAL
Refills: 0 | Status: DISCONTINUED | OUTPATIENT
Start: 2019-09-23 | End: 2019-09-23

## 2019-09-23 RX ORDER — ACETAMINOPHEN 500 MG
1000 TABLET ORAL ONCE
Refills: 0 | Status: COMPLETED | OUTPATIENT
Start: 2019-09-23 | End: 2019-09-23

## 2019-09-23 RX ORDER — KETOROLAC TROMETHAMINE 30 MG/ML
30 SYRINGE (ML) INJECTION ONCE
Refills: 0 | Status: DISCONTINUED | OUTPATIENT
Start: 2019-09-23 | End: 2019-09-23

## 2019-09-23 RX ORDER — CALCIUM GLUCONATE 100 MG/ML
1 VIAL (ML) INTRAVENOUS ONCE
Refills: 0 | Status: COMPLETED | OUTPATIENT
Start: 2019-09-23 | End: 2019-09-23

## 2019-09-23 RX ORDER — TRAMADOL HYDROCHLORIDE 50 MG/1
50 TABLET ORAL EVERY 6 HOURS
Refills: 0 | Status: DISCONTINUED | OUTPATIENT
Start: 2019-09-23 | End: 2019-09-23

## 2019-09-23 RX ORDER — SEVELAMER CARBONATE 2400 MG/1
800 POWDER, FOR SUSPENSION ORAL
Refills: 0 | Status: DISCONTINUED | OUTPATIENT
Start: 2019-09-23 | End: 2019-09-25

## 2019-09-23 RX ORDER — ERYTHROPOIETIN 10000 [IU]/ML
4000 INJECTION, SOLUTION INTRAVENOUS; SUBCUTANEOUS
Refills: 0 | Status: DISCONTINUED | OUTPATIENT
Start: 2019-09-23 | End: 2019-09-25

## 2019-09-23 RX ORDER — ASCORBIC ACID 60 MG
500 TABLET,CHEWABLE ORAL DAILY
Refills: 0 | Status: DISCONTINUED | OUTPATIENT
Start: 2019-09-23 | End: 2019-09-25

## 2019-09-23 RX ORDER — DEXTROSE 50 % IN WATER 50 %
50 SYRINGE (ML) INTRAVENOUS ONCE
Refills: 0 | Status: COMPLETED | OUTPATIENT
Start: 2019-09-23 | End: 2019-09-23

## 2019-09-23 RX ORDER — METOPROLOL TARTRATE 50 MG
25 TABLET ORAL
Refills: 0 | Status: DISCONTINUED | OUTPATIENT
Start: 2019-09-23 | End: 2019-09-24

## 2019-09-23 RX ORDER — ALBUTEROL 90 UG/1
2 AEROSOL, METERED ORAL EVERY 6 HOURS
Refills: 0 | Status: DISCONTINUED | OUTPATIENT
Start: 2019-09-23 | End: 2019-09-23

## 2019-09-23 RX ORDER — ALBUTEROL 90 UG/1
2 AEROSOL, METERED ORAL EVERY 6 HOURS
Refills: 0 | Status: DISCONTINUED | OUTPATIENT
Start: 2019-09-23 | End: 2019-09-25

## 2019-09-23 RX ORDER — MIRTAZAPINE 45 MG/1
7.5 TABLET, ORALLY DISINTEGRATING ORAL AT BEDTIME
Refills: 0 | Status: DISCONTINUED | OUTPATIENT
Start: 2019-09-23 | End: 2019-09-25

## 2019-09-23 RX ORDER — SODIUM POLYSTYRENE SULFONATE 4.1 MEQ/G
30 POWDER, FOR SUSPENSION ORAL ONCE
Refills: 0 | Status: COMPLETED | OUTPATIENT
Start: 2019-09-23 | End: 2019-09-23

## 2019-09-23 RX ORDER — GABAPENTIN 400 MG/1
300 CAPSULE ORAL AT BEDTIME
Refills: 0 | Status: DISCONTINUED | OUTPATIENT
Start: 2019-09-23 | End: 2019-09-25

## 2019-09-23 RX ORDER — SIMVASTATIN 20 MG/1
40 TABLET, FILM COATED ORAL AT BEDTIME
Refills: 0 | Status: DISCONTINUED | OUTPATIENT
Start: 2019-09-23 | End: 2019-09-25

## 2019-09-23 RX ORDER — FOLIC ACID 0.8 MG
1 TABLET ORAL DAILY
Refills: 0 | Status: DISCONTINUED | OUTPATIENT
Start: 2019-09-23 | End: 2019-09-25

## 2019-09-23 RX ORDER — CLOPIDOGREL BISULFATE 75 MG/1
75 TABLET, FILM COATED ORAL DAILY
Refills: 0 | Status: DISCONTINUED | OUTPATIENT
Start: 2019-09-23 | End: 2019-09-25

## 2019-09-23 RX ORDER — PANTOPRAZOLE SODIUM 20 MG/1
40 TABLET, DELAYED RELEASE ORAL
Refills: 0 | Status: DISCONTINUED | OUTPATIENT
Start: 2019-09-23 | End: 2019-09-25

## 2019-09-23 RX ORDER — RISPERIDONE 4 MG/1
1 TABLET ORAL AT BEDTIME
Refills: 0 | Status: DISCONTINUED | OUTPATIENT
Start: 2019-09-23 | End: 2019-09-25

## 2019-09-23 RX ADMIN — PANTOPRAZOLE SODIUM 40 MILLIGRAM(S): 20 TABLET, DELAYED RELEASE ORAL at 06:01

## 2019-09-23 RX ADMIN — SODIUM POLYSTYRENE SULFONATE 30 GRAM(S): 4.1 POWDER, FOR SUSPENSION ORAL at 02:01

## 2019-09-23 RX ADMIN — SODIUM CHLORIDE 3 MILLILITER(S): 9 INJECTION INTRAMUSCULAR; INTRAVENOUS; SUBCUTANEOUS at 01:05

## 2019-09-23 RX ADMIN — ERYTHROPOIETIN 4000 UNIT(S): 10000 INJECTION, SOLUTION INTRAVENOUS; SUBCUTANEOUS at 12:03

## 2019-09-23 RX ADMIN — MORPHINE SULFATE 2 MILLIGRAM(S): 50 CAPSULE, EXTENDED RELEASE ORAL at 07:04

## 2019-09-23 RX ADMIN — Medication 25 MILLIGRAM(S): at 05:15

## 2019-09-23 RX ADMIN — Medication 50 MILLIGRAM(S): at 17:09

## 2019-09-23 RX ADMIN — SEVELAMER CARBONATE 800 MILLIGRAM(S): 2400 POWDER, FOR SUSPENSION ORAL at 18:08

## 2019-09-23 RX ADMIN — Medication 50 MILLILITER(S): at 02:07

## 2019-09-23 RX ADMIN — RISPERIDONE 1 MILLIGRAM(S): 4 TABLET ORAL at 21:07

## 2019-09-23 RX ADMIN — SEVELAMER CARBONATE 800 MILLIGRAM(S): 2400 POWDER, FOR SUSPENSION ORAL at 14:35

## 2019-09-23 RX ADMIN — TRAMADOL HYDROCHLORIDE 50 MILLIGRAM(S): 50 TABLET ORAL at 05:53

## 2019-09-23 RX ADMIN — MIRTAZAPINE 7.5 MILLIGRAM(S): 45 TABLET, ORALLY DISINTEGRATING ORAL at 21:07

## 2019-09-23 RX ADMIN — Medication 400 MILLIGRAM(S): at 22:53

## 2019-09-23 RX ADMIN — SIMVASTATIN 40 MILLIGRAM(S): 20 TABLET, FILM COATED ORAL at 21:07

## 2019-09-23 RX ADMIN — Medication 1000 MILLIGRAM(S): at 23:18

## 2019-09-23 RX ADMIN — Medication 25 MILLIGRAM(S): at 21:08

## 2019-09-23 RX ADMIN — Medication 500 MILLIGRAM(S): at 14:34

## 2019-09-23 RX ADMIN — MORPHINE SULFATE 2 MILLIGRAM(S): 50 CAPSULE, EXTENDED RELEASE ORAL at 08:00

## 2019-09-23 RX ADMIN — Medication 80 MILLIGRAM(S): at 03:58

## 2019-09-23 RX ADMIN — CLOPIDOGREL BISULFATE 75 MILLIGRAM(S): 75 TABLET, FILM COATED ORAL at 14:34

## 2019-09-23 RX ADMIN — SEVELAMER CARBONATE 800 MILLIGRAM(S): 2400 POWDER, FOR SUSPENSION ORAL at 09:39

## 2019-09-23 RX ADMIN — GABAPENTIN 300 MILLIGRAM(S): 400 CAPSULE ORAL at 21:07

## 2019-09-23 RX ADMIN — Medication 200 GRAM(S): at 02:00

## 2019-09-23 RX ADMIN — Medication 50 MILLIGRAM(S): at 21:08

## 2019-09-23 RX ADMIN — TRAMADOL HYDROCHLORIDE 50 MILLIGRAM(S): 50 TABLET ORAL at 05:14

## 2019-09-23 RX ADMIN — INSULIN HUMAN 10 UNIT(S): 100 INJECTION, SOLUTION SUBCUTANEOUS at 02:09

## 2019-09-23 RX ADMIN — Medication 30 MILLIGRAM(S): at 17:04

## 2019-09-23 RX ADMIN — Medication 10 MILLIGRAM(S): at 14:35

## 2019-09-23 RX ADMIN — Medication 1 MILLIGRAM(S): at 14:34

## 2019-09-23 RX ADMIN — Medication 325 MILLIGRAM(S): at 14:35

## 2019-09-23 RX ADMIN — Medication 50 MILLIGRAM(S): at 05:14

## 2019-09-23 NOTE — H&P ADULT - ATTENDING COMMENTS
39 year old patient of Dr. Andino known to me from a prior admission.  Extensive medical hx of ESRD on HD, DM 2 well controlled, hx of cocaine abuse, morbid obesity, chronic respiratory failure on home O2 (4L NC), morbid obesity, bipolar disorder, schizophrenia and HTN  coming in with complaints of SOB and B/L LE pain similar to his presentation in the past. He has been compliant with HD. No finding on ROS.    Vital Signs Last 24 Hrs  T(C): 36.3 (23 Sep 2019 07:05), Max: 37.3 (23 Sep 2019 00:03)  T(F): 97.4 (23 Sep 2019 07:05), Max: 99.1 (23 Sep 2019 00:03)  HR: 84 (23 Sep 2019 07:05) (84 - 96)  BP: 147/91 (23 Sep 2019 07:05) (146/89 - 150/72)  RR: 18 (23 Sep 2019 07:05) (17 - 18)  SpO2: 100% (23 Sep 2019 07:05) (98% - 100%)    Young man,  obese++, sleeping presently, NAD, AAO X 3  Mild pallor anicteric, no JVD  CTA B/L RRR S1S2 only  Full, obese, pitting wall edema over the infraumbilical region; otherwise NTND BS+  ++++pitting edema over both LE, no calf tenderness  No focal deficits                            9.2    7.87  )-----------( 257      ( 23 Sep 2019 01:01 )             30.9     09-23    135  |  96  |  85<H>  ----------------------------<  74  5.8<H>   |  26  |  12.20<H>    Ca    9.4      23 Sep 2019 04:27    Impression  39 year old man with hx as detailed above presenting with 2 days of persistent SOB and B/L LE edema despite HD suggestive of a fluid overload. There is no evidence of CHF from recent ECHO and this would suggest a primary renal etiology.    A/P  - Fluid overload  - ESRD on HD  - Chronic respiratory failure likely from fluid overload, morbid obesity +/- undiagnosed KRYSTIN on home O2    Plan  Commence IV Lasix 40mg Q 12 hourly  Monitor weight and I/O daily; fluid intake restriction  Renal diet  Continue HD session today; Consult Dr La  Obtain baseline trop and urine tox assay  Pain control. 39 year old patient of Dr. Andino known to me from a prior admission.  Extensive medical hx of ESRD on HD, DM 2 well controlled, hx of cocaine abuse, morbid obesity, chronic respiratory failure on home O2 (4L NC), morbid obesity, bipolar disorder, schizophrenia and HTN  coming in with complaints of SOB and B/L LE pain similar to his presentation in the past. He has been compliant with HD. No finding on ROS.    Vital Signs Last 24 Hrs  T(C): 36.3 (23 Sep 2019 07:05), Max: 37.3 (23 Sep 2019 00:03)  T(F): 97.4 (23 Sep 2019 07:05), Max: 99.1 (23 Sep 2019 00:03)  HR: 84 (23 Sep 2019 07:05) (84 - 96)  BP: 147/91 (23 Sep 2019 07:05) (146/89 - 150/72)  RR: 18 (23 Sep 2019 07:05) (17 - 18)  SpO2: 100% (23 Sep 2019 07:05) (98% - 100%)    Young man,  obese++, sleeping presently, NAD, AAO X 3  Mild pallor anicteric, no JVD  CTA B/L RRR S1S2 only  Full, obese, pitting wall edema over the infraumbilical region; otherwise NTND BS+  ++++pitting edema over both LE, no calf tenderness  No focal deficits                            9.2    7.87  )-----------( 257      ( 23 Sep 2019 01:01 )             30.9     09-23    135  |  96  |  85<H>  ----------------------------<  74  5.8<H>   |  26  |  12.20<H>    Ca    9.4      23 Sep 2019 04:27    Impression  39 year old man with hx as detailed above presenting with 2 days of persistent SOB and B/L LE edema despite HD suggestive of a fluid overload. There is no evidence of CHF from recent ECHO and this would suggest a primary renal etiology.    A/P  - Fluid overload  - Hyperkalemia -  - ESRD on HD  - Chronic respiratory failure likely from fluid overload, morbid obesity +/- undiagnosed KRYSTIN on home O2    Plan  Commence IV Lasix 40mg Q 12 hourly  Monitor weight and I/O daily; fluid intake restriction  K+ improved to 5.5 this AM. no clinical or tele changes. No further action; await HD  Renal diet  Continue HD session today; Consult Dr La  Obtain baseline trop and urine tox assay  Pain control.

## 2019-09-23 NOTE — ED ADULT NURSE NOTE - OBJECTIVE STATEMENT
patient presents to the ED c/o shortness of breath x 2 days, dyspnea at rest noted, no chest pain, no n/v/d, bilateral lower leg edema +2,

## 2019-09-23 NOTE — H&P ADULT - ASSESSMENT
Pt is a 39 years old male from home with PMHx of ESRD on MWF HD (via [facility]), DM (on insulin), cocaine abuse, on home O2 (4L NC), morbid obesity, Bipolar disorder, schizophrenia and HTN presents to the ED with chief complaint of shortness of breath for 1 day. as per pt the last dialysis session was on 9/20 , pt states to be complaint with fluid restriction. Pt denies any chest pain, palpitations, severe weakness or diaphoresis. Pt had multiple admissions in past for similar presentation requiring urgent dialysis.  Pt is being admitted to medicine for Urgent dialysis for pulmonary edema and hyperkalemia

## 2019-09-23 NOTE — H&P ADULT - PROBLEM SELECTOR PLAN 4
Pt at home on novolog 15 units TID  Last Hba1c 5.5  Continue with sliding scale and Lantus 15 units at bedtime

## 2019-09-23 NOTE — ED PROVIDER NOTE - CARE PLAN
Principal Discharge DX:	Hyperkalemia  Secondary Diagnosis:	ESRD (end stage renal disease) on dialysis

## 2019-09-23 NOTE — ED PROVIDER NOTE - CLINICAL SUMMARY MEDICAL DECISION MAKING FREE TEXT BOX
MAR and Dr. Andino endorsed. Pt agrees with admission for HD, respiratory monitoring. I had a detailed discussion with the patient and/or guardian regarding the historical points, exam findings, and any diagnostic results supporting the admit diagnosis.   Renal Dr. Sebastian was paged x 2.

## 2019-09-23 NOTE — H&P ADULT - PROBLEM SELECTOR PLAN 2
Pt K on admission 6.4  No EKG changes  s/p Calcium gluconates, Insulin& 50 and kayexsalate  f/u repeat bmp  Dialysis in am

## 2019-09-23 NOTE — ED PROVIDER NOTE - OBJECTIVE STATEMENT
Chief complaint  of SOB, exertional dyspnea since discharge on 3/20/19, pt states b/l edema enarlging. Pt states received HD on 3/20 as inpt.

## 2019-09-23 NOTE — H&P ADULT - NSHPPHYSICALEXAM_GEN_ALL_CORE
PHYSICAL EXAM:  GENERAL: Appears in some resp distress  HEAD:  Atraumatic, Normocephalic  EYES: EOMI, PERRLA, conjunctiva and sclera clear  NECK: Supple, No JVD  CHEST/LUNG: b/l crackles  HEART: Regular rate and rhythm; No murmurs;   ABDOMEN: Soft, Nontender,  EXTREMITIES:  B/L leg swelling   PSYCH: AAOx3  NEUROLOGY: non-focal  SKIN: No rashes or lesions

## 2019-09-23 NOTE — ED ADULT NURSE NOTE - CHPI ED NUR SYMPTOMS NEG
no diaphoresis/no shortness of breath/no congestion/no fever/no nausea/no dizziness/no syncope/no chest pain/no vomiting/no back pain/no chills

## 2019-09-23 NOTE — PATIENT PROFILE ADULT - DOES PATIENT HAVE ADVANCE DIRECTIVE
Telephone Encounter by Sunitha Avila at 10/23/18 03:47 PM     Author:  Sunitha Avila Service:  (none) Author Type:  Patient      Filed:  10/23/18 03:50 PM Encounter Date:  10/23/2018 Status:  Signed     :  Sunitha Avila (Patient )              DAENA JEAN BAPTISTE    Patient Age: 78 year old    ACCT STATUS:   MESSAGE:[JA1.1T] patient states she was in today and had a rocephin shot, states she has diarrhea now is asking if this could be from the shot , took imodium, should she be getting the shot tomorrow if thisi is giving her diarrhea?[JA1.1M] Message confirmed with caller.     Next and Last Visit with Provider and Department  Next visit with WHITNEY MOLINA is on 11/01/2018 at 10:25 AM in [facility]  Next visit with [facility] is on 10/24/2018 at  9:15 AM in [facility]  Last visit with WHITNEY MOLINA was on 11/16/2017 at 10:25 AM in [facility]  Last visit with [facility] was on 11/16/2017 at 10:25 AM in [facility]     WEIGHT AND HEIGHT: As of 01/22/2018 weight is 180 lbs.(81.647 kg). Height is 5' 3.25\"(1.607 m).   BMI is 31.62 kg/(m^2) calculated from:     Height 5' 3.25\" (1.607 m) as of 1/22/18     Weight 180 lb (81.647 kg) as of 1/22/18      Allergies      Allergen   Reactions   • Ace Inhibitors  Cough   • Nifedipine  Itching   • Spironolactone  Itching     Rash and nausea    • Yellow Dye  Rash     Patient states she was tested years ago    • Codeine  Nausea and Vomiting   • Minoxidil  Other - See Comments     Swollen Eyes    • Morphine And Related  Nausea and Vomiting   • Aspirin  Other - See Comments     Told to stay off due to asthma- low doses ok     • Fish Oil  Other - See Comments     Fish Via skin test    • Triamterene-Hctz [Hydrochlorothiazide W/Triamterene]  Other - See Comments     HOSPITALIZED    • Vicodin [Hydrocodone-Acetaminophen]  Nausea and Vomiting     Current outpatient prescriptions       Medication   Sig Dispense Refill   • cefTRIAXone (ROCEPHIN) 1 G injection Inject 1 g into the muscle once for 1 dose. Administered in the office 1 Each 0   • levofloxacin (LEVAQUIN) 500 MG tablet Take 1 Tab by mouth daily for 7 days. 7 Tab 0   • ampicillin (PRINCIPEN) 250 MG Cap Take 1 Cap by mouth 4 (four) times daily for 7 days. 28 Cap 0   • eplerenone (INSPRA) 25 MG tablet TAKE 1 TABLET BY MOUTH DAILY 90 Tab 0   • omeprazole (PRILOSEC) 20 MG Cap Take 1 Cap by mouth 2 (two) times daily. 180 Cap 0   • nystatin-triamcinolone (MYCOLOG II) cream Apply  topically 2 (two) times daily. Apply to the affected area for a maximum of 2 weeks. 30 g 1   • metronidazole (METROCREAM) 0.75 % cream Apply to Rosacea of face twice daily as directed 45 g 2   • albuterol (PROVENTIL) (2.5 MG/3ML) 0.083% nebulizer solution USE 1 VIAL IN NEBULIZER EVERY 4 HOURS AS NEEDED FOR WHEEZING OR SHORTNESS OF BREATH - DX Asthma J45.909 75 mL 0   • OMEPRAZOLE      • fluticasone (FLOVENT HFA) 220 MCG/ACT inhaler Inhale 2 Puffs by mouth 2 (two) times daily. 12 g 0   • PROAIR  (90 BASE) MCG/ACT inhaler INHALE 2 PUFFS BY MOUTH EVERY 6 HOURS AS NEEDED FOR WHEEZING 8.5 g 0   • tizanidine (ZANAFLEX) 4 MG tablet Take 1 Tab by mouth every 6 (six) hours as needed. 30 Tab 0   • Glycerin-Hypromellose- (VISINE TEARS OP) Apply  in the eye(s).     • fluorouracil (EFUDEX) 5 % cream Apply 2 times daily to affected area FOR 4 WEEKS. 30 g 1   • Spacer/Aero-Holding Chambers (AEROCHAMBER) MISC Please use with QVAR and albuterol inhalers 1 Each 0   • Fluocinolone Acetonide (SYNALAR) 0.01 % EX SOLN apply to itchy areas(scalp) bid 60 mL 2   • COMPRESSOR/NEBULIZER KIT use as dir 1 mouthpiece/tubing 2      PHARMACY to use:           Pharmacy preference(s) on file:    [facility] [address]  [facility]    CALL BACK INFO:[JA1.1T] Ok to leave response (including medical information) on answering machine[JA1.1M]  ROUTING:[JA1.1T]  Patient's physician/staff[JA1.1M]        PCP: Brandy De La Paz MD         INS: Payor: MEDICARE - ASSIGNED / Plan: *No Plan* / Product Type: *No Product type* / Note: This is the primary coverage, but no account was found for this location or the patient's primary location.   ADDRESS:  [address] [place] [id number][JA1.1T]       Revision History        User Key Date/Time User Provider Type Action    > JA1.1 10/23/18 03:50 PM Sunitha Avila Patient  Sign    M - Manual, T - Template             no

## 2019-09-23 NOTE — PROGRESS NOTE ADULT - ASSESSMENT
one more admission   same acute onset sob.  recentlly .  without any cp or sob    no abd pain as before didnt go to see pulmonary  had high K got cocktail  now in HD   labs noted   nephro, echo, urine tox H/o Cocain in past  morbid obesity, esrd on Hd  possible sleep apnea syndrome witout cpap poor prognosis

## 2019-09-23 NOTE — H&P ADULT - PROBLEM SELECTOR PLAN 8
IMPROVE VTE Individual Risk Assessment    RISK                                                                Points  [  ] Previous VTE                                                  3  [  ] Thrombophilia                                               2  [  ] Lower limb paralysis                                      2        (unable to hold up >15 seconds)    [  ] Current Cancer                                              2         (within 6 months)  [ x ] Immobilization > 24 hrs                                1  [  ] ICU/CCU stay > 24 hours                              1  [  ] Age > 60                                                      1  IMPROVE VTE Score ___1___  No indication for chemoprophylaxis  Re-eval in 24 hours

## 2019-09-23 NOTE — H&P ADULT - PROBLEM SELECTOR PLAN 1
Sob, elevated BNP and congestion on cxr  s/p 80 IV lasix  Currently appears ok on nasal canula  Plan for dialysis in am  Nephro Dr La  F/u T2  EKG NSR

## 2019-09-23 NOTE — H&P ADULT - NSHPREVIEWOFSYSTEMS_GEN_ALL_CORE
REVIEW OF SYSTEMS:    CONSTITUTIONAL: Pt sitting on bed upright,  EYES/ENT: No visual changes;  No vertigo or throat pain   NECK: No pain or stiffness  RESPIRATORY: + shortness of breath  CARDIOVASCULAR: No chest pain or palpitations  GASTROINTESTINAL: No abdominal or epigastric pain. No nausea, vomiting, or hematemesis; No diarrhea or constipation. No melena or hematochezia.  GENITOURINARY: No dysuria, frequency or hematuria  NEUROLOGICAL: No numbness or weakness  SKIN: No itching, rashes  EXT: B/l leg pain and swelling

## 2019-09-23 NOTE — H&P ADULT - PROBLEM SELECTOR PLAN 5
Pt on amlodipine, hydralazine, metoprolol and lasix at home  c/w metoprolol and lasix with parameters  add home meds acc to BP

## 2019-09-24 LAB
ANION GAP SERPL CALC-SCNC: 10 MMOL/L — SIGNIFICANT CHANGE UP (ref 5–17)
ANISOCYTOSIS BLD QL: SLIGHT — SIGNIFICANT CHANGE UP
BASO STIPL BLD QL SMEAR: PRESENT — SIGNIFICANT CHANGE UP
BASOPHILS # BLD AUTO: 0 K/UL — SIGNIFICANT CHANGE UP (ref 0–0.2)
BASOPHILS NFR BLD AUTO: 0 % — SIGNIFICANT CHANGE UP (ref 0–2)
BUN SERPL-MCNC: 65 MG/DL — HIGH (ref 7–18)
CALCIUM SERPL-MCNC: 9.2 MG/DL — SIGNIFICANT CHANGE UP (ref 8.4–10.5)
CHLORIDE SERPL-SCNC: 95 MMOL/L — LOW (ref 96–108)
CO2 SERPL-SCNC: 29 MMOL/L — SIGNIFICANT CHANGE UP (ref 22–31)
CREAT SERPL-MCNC: 10.3 MG/DL — HIGH (ref 0.5–1.3)
EOSINOPHIL # BLD AUTO: 0.55 K/UL — HIGH (ref 0–0.5)
EOSINOPHIL NFR BLD AUTO: 8 % — HIGH (ref 0–6)
GIANT PLATELETS BLD QL SMEAR: PRESENT — SIGNIFICANT CHANGE UP
GLUCOSE BLDC GLUCOMTR-MCNC: 123 MG/DL — HIGH (ref 70–99)
GLUCOSE BLDC GLUCOMTR-MCNC: 126 MG/DL — HIGH (ref 70–99)
GLUCOSE BLDC GLUCOMTR-MCNC: 167 MG/DL — HIGH (ref 70–99)
GLUCOSE BLDC GLUCOMTR-MCNC: 201 MG/DL — HIGH (ref 70–99)
GLUCOSE SERPL-MCNC: 116 MG/DL — HIGH (ref 70–99)
HCT VFR BLD CALC: 31.5 % — LOW (ref 39–50)
HGB BLD-MCNC: 9.3 G/DL — LOW (ref 13–17)
LYMPHOCYTES # BLD AUTO: 0.68 K/UL — LOW (ref 1–3.3)
LYMPHOCYTES # BLD AUTO: 10 % — LOW (ref 13–44)
MACROCYTES BLD QL: SLIGHT — SIGNIFICANT CHANGE UP
MAGNESIUM SERPL-MCNC: 2.4 MG/DL — SIGNIFICANT CHANGE UP (ref 1.6–2.6)
MCHC RBC-ENTMCNC: 26.7 PG — LOW (ref 27–34)
MCHC RBC-ENTMCNC: 29.5 GM/DL — LOW (ref 32–36)
MCV RBC AUTO: 90.5 FL — SIGNIFICANT CHANGE UP (ref 80–100)
MICROCYTES BLD QL: SLIGHT — SIGNIFICANT CHANGE UP
MONOCYTES # BLD AUTO: 1.5 K/UL — HIGH (ref 0–0.9)
MONOCYTES NFR BLD AUTO: 22 % — HIGH (ref 2–14)
NEUTROPHILS # BLD AUTO: 4.1 K/UL — SIGNIFICANT CHANGE UP (ref 1.8–7.4)
NEUTROPHILS NFR BLD AUTO: 60 % — SIGNIFICANT CHANGE UP (ref 43–77)
NRBC # BLD: 0 /100 — SIGNIFICANT CHANGE UP (ref 0–0)
OVALOCYTES BLD QL SMEAR: SLIGHT — SIGNIFICANT CHANGE UP
PHOSPHATE SERPL-MCNC: 7.9 MG/DL — HIGH (ref 2.5–4.5)
PLAT MORPH BLD: NORMAL — SIGNIFICANT CHANGE UP
PLATELET # BLD AUTO: 291 K/UL — SIGNIFICANT CHANGE UP (ref 150–400)
POIKILOCYTOSIS BLD QL AUTO: SLIGHT — SIGNIFICANT CHANGE UP
POLYCHROMASIA BLD QL SMEAR: SLIGHT — SIGNIFICANT CHANGE UP
POTASSIUM SERPL-MCNC: 5.7 MMOL/L — HIGH (ref 3.5–5.3)
POTASSIUM SERPL-SCNC: 5.7 MMOL/L — HIGH (ref 3.5–5.3)
RBC # BLD: 3.48 M/UL — LOW (ref 4.2–5.8)
RBC # FLD: 18.6 % — HIGH (ref 10.3–14.5)
RBC BLD AUTO: ABNORMAL
ROULEAUX BLD QL SMEAR: PRESENT
SODIUM SERPL-SCNC: 134 MMOL/L — LOW (ref 135–145)
SPHEROCYTES BLD QL SMEAR: SLIGHT — SIGNIFICANT CHANGE UP
WBC # BLD: 6.84 K/UL — SIGNIFICANT CHANGE UP (ref 3.8–10.5)
WBC # FLD AUTO: 6.84 K/UL — SIGNIFICANT CHANGE UP (ref 3.8–10.5)

## 2019-09-24 PROCEDURE — 99223 1ST HOSP IP/OBS HIGH 75: CPT | Mod: GC

## 2019-09-24 PROCEDURE — 93970 EXTREMITY STUDY: CPT | Mod: 26

## 2019-09-24 RX ORDER — CARVEDILOL PHOSPHATE 80 MG/1
3.12 CAPSULE, EXTENDED RELEASE ORAL EVERY 12 HOURS
Refills: 0 | Status: DISCONTINUED | OUTPATIENT
Start: 2019-09-24 | End: 2019-09-25

## 2019-09-24 RX ORDER — SODIUM POLYSTYRENE SULFONATE 4.1 MEQ/G
30 POWDER, FOR SUSPENSION ORAL ONCE
Refills: 0 | Status: COMPLETED | OUTPATIENT
Start: 2019-09-24 | End: 2019-09-24

## 2019-09-24 RX ORDER — CHLORHEXIDINE GLUCONATE 213 G/1000ML
1 SOLUTION TOPICAL DAILY
Refills: 0 | Status: DISCONTINUED | OUTPATIENT
Start: 2019-09-24 | End: 2019-09-25

## 2019-09-24 RX ORDER — ACETAMINOPHEN 500 MG
1000 TABLET ORAL ONCE
Refills: 0 | Status: DISCONTINUED | OUTPATIENT
Start: 2019-09-24 | End: 2019-09-25

## 2019-09-24 RX ORDER — MORPHINE SULFATE 50 MG/1
2 CAPSULE, EXTENDED RELEASE ORAL ONCE
Refills: 0 | Status: DISCONTINUED | OUTPATIENT
Start: 2019-09-24 | End: 2019-09-24

## 2019-09-24 RX ORDER — AMLODIPINE BESYLATE 2.5 MG/1
2.5 TABLET ORAL DAILY
Refills: 0 | Status: DISCONTINUED | OUTPATIENT
Start: 2019-09-24 | End: 2019-09-25

## 2019-09-24 RX ORDER — OXYCODONE AND ACETAMINOPHEN 5; 325 MG/1; MG/1
1 TABLET ORAL EVERY 6 HOURS
Refills: 0 | Status: DISCONTINUED | OUTPATIENT
Start: 2019-09-24 | End: 2019-09-25

## 2019-09-24 RX ADMIN — MORPHINE SULFATE 2 MILLIGRAM(S): 50 CAPSULE, EXTENDED RELEASE ORAL at 22:56

## 2019-09-24 RX ADMIN — SODIUM POLYSTYRENE SULFONATE 30 GRAM(S): 4.1 POWDER, FOR SUSPENSION ORAL at 15:18

## 2019-09-24 RX ADMIN — Medication 50 MILLIGRAM(S): at 05:33

## 2019-09-24 RX ADMIN — RISPERIDONE 1 MILLIGRAM(S): 4 TABLET ORAL at 22:47

## 2019-09-24 RX ADMIN — SIMVASTATIN 40 MILLIGRAM(S): 20 TABLET, FILM COATED ORAL at 22:47

## 2019-09-24 RX ADMIN — MORPHINE SULFATE 2 MILLIGRAM(S): 50 CAPSULE, EXTENDED RELEASE ORAL at 23:11

## 2019-09-24 RX ADMIN — PANTOPRAZOLE SODIUM 40 MILLIGRAM(S): 20 TABLET, DELAYED RELEASE ORAL at 05:33

## 2019-09-24 RX ADMIN — Medication 50 MILLIGRAM(S): at 14:49

## 2019-09-24 RX ADMIN — OXYCODONE AND ACETAMINOPHEN 1 TABLET(S): 5; 325 TABLET ORAL at 20:15

## 2019-09-24 RX ADMIN — OXYCODONE AND ACETAMINOPHEN 1 TABLET(S): 5; 325 TABLET ORAL at 21:00

## 2019-09-24 RX ADMIN — SEVELAMER CARBONATE 800 MILLIGRAM(S): 2400 POWDER, FOR SUSPENSION ORAL at 14:48

## 2019-09-24 RX ADMIN — CLOPIDOGREL BISULFATE 75 MILLIGRAM(S): 75 TABLET, FILM COATED ORAL at 12:48

## 2019-09-24 RX ADMIN — GABAPENTIN 300 MILLIGRAM(S): 400 CAPSULE ORAL at 22:47

## 2019-09-24 RX ADMIN — Medication 25 MILLIGRAM(S): at 05:33

## 2019-09-24 RX ADMIN — Medication 10 MILLIGRAM(S): at 14:48

## 2019-09-24 RX ADMIN — Medication 1 MILLIGRAM(S): at 12:47

## 2019-09-24 RX ADMIN — CHLORHEXIDINE GLUCONATE 1 APPLICATION(S): 213 SOLUTION TOPICAL at 12:47

## 2019-09-24 RX ADMIN — SEVELAMER CARBONATE 800 MILLIGRAM(S): 2400 POWDER, FOR SUSPENSION ORAL at 17:50

## 2019-09-24 RX ADMIN — Medication 500 MILLIGRAM(S): at 12:47

## 2019-09-24 RX ADMIN — CARVEDILOL PHOSPHATE 3.12 MILLIGRAM(S): 80 CAPSULE, EXTENDED RELEASE ORAL at 17:50

## 2019-09-24 RX ADMIN — Medication 325 MILLIGRAM(S): at 12:47

## 2019-09-24 RX ADMIN — Medication 50 MILLIGRAM(S): at 22:47

## 2019-09-24 RX ADMIN — MIRTAZAPINE 7.5 MILLIGRAM(S): 45 TABLET, ORALLY DISINTEGRATING ORAL at 22:46

## 2019-09-24 NOTE — CONSULT NOTE ADULT - PROBLEM SELECTOR RECOMMENDATION 9
- p/w Dyspnea at rest   - elevated BNP [id number]  - s/p 80 IV lasix  - 4 L NC in bed  - last HD in 09/24  - CXR shows Pulmonary vascular congestion   - TTE : RV systolic pressure is mildly increased at  44 mm Hg (PHTN) , GIII diastolic dysfunction    - Paulthom HTN group III secondary to KRYSTIN   - pt would beneifit CPAP HS for KRYSTIN   - pt was using CPAP machine of his other but has been not tolerating it

## 2019-09-24 NOTE — PROGRESS NOTE ADULT - ASSESSMENT
kenny nd examined vsstable afebrile physical unchaged    no cp or sob or palp  on bed doesnt want to come out of bed  says feels bad  tylenol doesnt give me relief i want strong meds  physical unchaged  morbid obese  a/p echo severe diastolic dysfunction  cardiology  rt [facility]  pulmonary   needs cpap  as per him sometimes uses moms cpap.  i have spoken to pulm , as per him he doesnot follow.  also as per him  i have cpap, i wont use as it chokes me  morbid obese, esrd, sleep apnea  high risk of sudden death  encouraged diet and exrcise and wt reduction  diastolic dys card  phtn  sleep apnea pulm

## 2019-09-24 NOTE — CONSULT NOTE ADULT - SUBJECTIVE AND OBJECTIVE BOX
[facility] : Progress Note :: [phone], (office [phone]),   Dr La / Dr Hui / Dr Barber / Dr Villalobos / Dr Hang CASTELLANO / Dr Mehta / Dr Sanchez / Dr Alber dumont  _____________________________________________________________________________________________  Patient is a 39y Male with ESRD on HD MWF. Was just discharged over the weekend. Brought to ED this AM by EMS after family called as he was short of breath. In ED found with pulmonary edema and hyperkalemia prompting a renal consult for emergent HD. He is currently seen on dialysis with improvement of SOB    PAST MEDICAL & SURGICAL HISTORY:  Migraine  HTN (hypertension)  DM (diabetes mellitus)  Bipolar disorder  Schizophrenia  ESRD on dialysis  Morbid obesity with BMI of 40.0-44.9, adult  H/O hernia repair    aspirin (Short breath)  aspirin (Swelling)  penicillin (Short breath)  penicillins (Swelling)  shellfish (Unknown)    Home Medications Reviewed  Hospital Medications:   MEDICATIONS  (STANDING):  ascorbic acid 500 milliGRAM(s) Oral daily  clopidogrel Tablet 75 milliGRAM(s) Oral daily  epoetin cyndie Injectable 4000 Unit(s) IV Push <User Schedule>  ferrous    sulfate 325 milliGRAM(s) Oral daily  folic acid 1 milliGRAM(s) Oral daily  gabapentin 300 milliGRAM(s) Oral at bedtime  hydrALAZINE 50 milliGRAM(s) Oral three times a day  metoprolol tartrate 25 milliGRAM(s) Oral two times a day  mirtazapine 7.5 milliGRAM(s) Oral at bedtime  pantoprazole    Tablet 40 milliGRAM(s) Oral before breakfast  PARoxetine 10 milliGRAM(s) Oral daily  risperiDONE   Tablet 1 milliGRAM(s) Oral at bedtime  sevelamer carbonate 800 milliGRAM(s) Oral three times a day with meals  simvastatin 40 milliGRAM(s) Oral at bedtime    SOCIAL HISTORY:  Denies ETOh,Smoking,   FAMILY HISTORY:  Family history of COPD (chronic obstructive pulmonary disease)  Family history of diabetes mellitus        VITALS:  T(F): 97.7 (09-23-19 @ 09:55), Max: 99.1 (09-23-19 @ 00:03)  HR: 75 (09-23-19 @ 09:55)  BP: 140/82 (09-23-19 @ 09:55)  RR: 21 (09-23-19 @ 09:55)  SpO2: 100% (09-23-19 @ 09:55)  Wt(kg): --      PHYSICAL EXAM:  Constitutional: NAD  HEENT: anicteric sclera, oropharynx clear.  Neck: No JVD  Respiratory: CTAB, no wheezes, rales or rhonchi  Cardiovascular: S1, S2, RRR  Gastrointestinal: BS+, soft, NT/ND  Extremities:   peripheral edema+++  Neurological: A/O x 3, no focal deficits  : No CVA tenderness. No hernandez.   Vascular Access: AVF cannulated    LABS:  09-23    135  |  96  |  85<H>  ----------------------------<  74  5.8<H>   |  26  |  12.20<H>    Ca    9.4      23 Sep 2019 04:27      Creatinine Trend: 12.20 <--, 12.30 <--, 9.83 <--, 8.90 <--, 9.56 <--                        9.2    7.87  )-----------( 257      ( 23 Sep 2019 01:01 )             30.9     Urine Studies:      RADIOLOGY & ADDITIONAL STUDIES:
CHIEF COMPLAINT:Patient is a 39y old  Male who presents with a chief complaint of Shortness of breath.      HPI:  39 years old male from home with PMHx of ESRD on MWF HD (via LUE AVF), DM (on insulin), cocaine abuse, on home O2 (4L NC), morbid obesity, Bipolar disorder, schizophrenia, KRYSTIN-not on CPAP and HTN presents to the ED with chief complaint of shortness of breath x2 days and worsening LE edema likely from fluid overload in setting of ESRD. per patient he completed HD on 09/16/19. Patient is also complaining of abdominal pain in umbilical region. Which is non-radiating and intermittent in nature. Patient has multiple recent admission in last few months for fluid overload needing urgent dialysis. Pt denies any chest pain, palpitations, nausea, vomiting, change in bowel or urinary habits.  patient is admitted with fluid overload needing urgent HD. (23 Sep 2019 04:29)      PAST MEDICAL & SURGICAL HISTORY:  Migraine  HTN (hypertension)  DM (diabetes mellitus)  Bipolar disorder  Schizophrenia  ESRD on dialysis  Morbid obesity with BMI of 40.0-44.9, adult  H/O hernia repair  KRYSTIN    MEDICATIONS  (STANDING):  ascorbic acid 500 milliGRAM(s) Oral daily  chlorhexidine 2% Cloths 1 Application(s) Topical daily  clopidogrel Tablet 75 milliGRAM(s) Oral daily  epoetin cyndie Injectable 4000 Unit(s) IV Push <User Schedule>  ferrous    sulfate 325 milliGRAM(s) Oral daily  folic acid 1 milliGRAM(s) Oral daily  gabapentin 300 milliGRAM(s) Oral at bedtime  hydrALAZINE 50 milliGRAM(s) Oral three times a day  metoprolol tartrate 25 milliGRAM(s) Oral two times a day  mirtazapine 7.5 milliGRAM(s) Oral at bedtime  pantoprazole    Tablet 40 milliGRAM(s) Oral before breakfast  PARoxetine 10 milliGRAM(s) Oral daily  risperiDONE   Tablet 1 milliGRAM(s) Oral at bedtime  sevelamer carbonate 800 milliGRAM(s) Oral three times a day with meals  simvastatin 40 milliGRAM(s) Oral at bedtime    MEDICATIONS  (PRN):  ALBUTerol    90 MICROgram(s) HFA Inhaler 2 Puff(s) Inhalation every 6 hours PRN Shortness of Breath      FAMILY HISTORY:  Family history of COPD (chronic obstructive pulmonary disease)  Family history of diabetes mellitus      SOCIAL HISTORY:    [x ] Non-smoker    [x ] Alcohol-denies    Allergies    aspirin (Short breath)  aspirin (Swelling)  penicillin (Short breath)  penicillins (Swelling)  shellfish (Unknown)    Intolerances    	    REVIEW OF SYSTEMS:  CONSTITUTIONAL: No fever, weight loss, or fatigue  EYES: No eye pain, visual disturbances, or discharge  ENT:  No difficulty hearing, tinnitus, vertigo; No sinus or throat pain  NECK: No pain or stiffness  RESPIRATORY: No cough, wheezing, chills or hemoptysis; + Shortness of Breath  CARDIOVASCULAR: No chest pain, palpitations, passing out, dizziness, or leg swelling  GASTROINTESTINAL: No abdominal or epigastric pain. No nausea, vomiting, or hematemesis; No diarrhea or constipation. No melena or hematochezia.  GENITOURINARY: No dysuria, frequency, hematuria, or incontinence  NEUROLOGICAL: No headaches, memory loss, loss of strength, numbness, or tremors  SKIN: No itching, burning, rashes, or lesions   LYMPH Nodes: No enlarged glands  ENDOCRINE: No heat or cold intolerance; No hair loss  MUSCULOSKELETAL: No joint pain or swelling; No muscle, back, or extremity pain  PSYCHIATRIC: No depression, anxiety, mood swings, or difficulty sleeping  HEME/LYMPH: No easy bruising, or bleeding gums  ALLERGY AND IMMUNOLOGIC: No hives or eczema	      PHYSICAL EXAM:  T(C): 36.7 (09-24-19 @ 12:10), Max: 37 (09-24-19 @ 02:01)  HR: 85 (09-24-19 @ 12:10) (81 - 99)  BP: 140/74 (09-24-19 @ 12:10) (131/63 - 166/83)  RR: 18 (09-24-19 @ 12:10) (16 - 19)  SpO2: 98% (09-24-19 @ 02:01) (98% - 100%)  Wt(kg): --  I&O's Summary      Appearance: Normal	  HEENT:   Normal oral mucosa, PERRL, EOMI	  Lymphatic: No lymphadenopathy  Cardiovascular: Normal S1 S2, No JVD, No murmurs, No edema  Respiratory: Lungs clear to auscultation	  Psychiatry: A & O x 3, Mood & affect appropriate  Gastrointestinal:  Soft, Non-tender, + BS	  Skin: No rashes, No ecchymoses, No cyanosis	  Neurologic: Non-focal  Extremities: Normal range of motion, No clubbing, cyanosis or edema  Vascular: Peripheral pulses palpable 2+ bilaterally     	    ECG:  	Normal sinus rhythm  Left axis deviation  	  LABS:	 	    CARDIAC MARKERS:  CARDIAC MARKERS ( 23 Sep 2019 08:17 )  0.022 ng/mL / x     / x     / x     / x      CARDIAC MARKERS ( 23 Sep 2019 01:01 )  0.031 ng/mL / x     / x     / x     / x                             9.3    6.84  )-----------( 291      ( 24 Sep 2019 09:36 )             31.5     09-24    134<L>  |  95<L>  |  65<H>  ----------------------------<  116<H>  5.7<H>   |  29  |  10.30<H>    Ca    9.2      24 Sep 2019 09:36  Phos  7.9     09-24  Mg     2.4     09-24    Cocaine Metabolite, Urine (08.17.19 @ 00:24)    Cocaine Metabolite, Urine: Negative      Drug screen-.    CONCLUSIONS:  1. Normal mitral valve. Mild mitral regurgitation.  2. Normal trileaflet aortic valve.  3. Aortic Root: 3.5 cm.  4. Normal left atrium.  LA volume index = 22 cc/m2.  5. Mild concentric left ventricular hypertrophy.  6. Endocardium not well visualized; grossly normal left  ventricular systolic function.  7. Grade III diastolic dysfunction.  8. Normal right atrium.  9. Normal right ventricular size and systolic function  (TAPSE  3.0cm).  10. RV systolic pressure is mildly increased at  44 mm Hg.  11. There is mild tricuspid regurgitation.  12. There is mild pulmonic regurgitation.  13. Trivial pericardial effusion is seen.    ------------------------------------------------------------------------  Confirmed on  9/20/2019 - 13:30:31 by Rachel Bob MD  ------------------------------------------------------------------------    INTERPRETATION:  Portable chest x-ray    Indication: Shortness of breath.    Portable chest x-ray is compared to a previous examination dated   9/19/2019.    Impression: Grossly stable pulmonary vascular congestion; underlying   pulmonary infiltrate/pneumonia cannot be excluded. This finding is   communicated with the emergency department via the PACS communication   system.    No evidence for pleural effusion or pneumothorax.    The trachea is midline.    Stable cardiac silhouette.    Stable left subclavian vascular stent.
CHIEF COMPLAINT: Shortness of breath    HPI: 39 years old male PMHx of ESRD on HD, DM, on home O2 (4L NC), morbid obesity and HTN p/w SOB and worsening LE edema   patient is admitted with fluid overload needing urgent HD.    PAST MEDICAL & SURGICAL HISTORY:  Migraine  HTN (hypertension)  DM (diabetes mellitus)  Bipolar disorder  Schizophrenia  ESRD on dialysis  Morbid obesity with BMI of 40.0-44.9, adult  H/O hernia repair      FAMILY HISTORY:  Family history of COPD (chronic obstructive pulmonary disease)  Family history of diabetes mellitus      SOCIAL HISTORY:  Smoking: [X] Never Smoked [ ] Former Smoker (__ packs x ___ years) [ ] Current Smoker  (__ packs x ___ years)  Substance Use: [] Never Used [X] Used Coccaine  EtOH Use: no Alcohol abuse can cause a lot of side effects in your body. It can cause cancer, liver failure, ulcer, pancreatitis, tremors, withdrawal effects, dependence, We strongly recommend to stop using alcohol, as it poses a lot of health effects abuse   Marital Status: [X] Single [ ]  [ ]  [ ]   Occupation: unemployed   Recent Travel: no recent travel       Allergies    aspirin (Short breath)  aspirin (Swelling)  penicillin (Short breath)  penicillins (Swelling)  shellfish (Unknown)    Intolerances        HOME MEDICATIONS:    REVIEW OF SYSTEMS:  Constitutional: [ ] fevers [ ] chills [ ] weight loss [ ] weight gain  HEENT: [ ] dry eyes [ ] eye irritation [ ] postnasal drip [ ] nasal congestion  CV: [ ] chest pain [ ] orthopnea [ ] palpitations [ ] murmur  Resp: [ ] cough [X] shortness of breath [ ] dyspnea [ ] wheezing [ ] sputum [ ] hemoptysis  GI: [ ] nausea [ ] vomiting [ ] diarrhea [ ] constipation [ ] abd pain [ ] dysphagia   : [ ] dysuria [ ] nocturia [ ] hematuria [ ] increased urinary frequency  Musculoskeletal: [ ] back pain [ ] myalgias [ ] arthralgias [ ] fracture  Skin: legs swelling [ ] rash [ ] itch ,   Neurological: [ ] headache [ ] dizziness [ ] syncope [ ] weakness [ ] numbness  Psychiatric: [ ] anxiety [ ] depression  Endocrine: [ ] diabetes [ ] thyroid problem  Hematologic/Lymphatic: [ ] anemia [ ] bleeding problem  Allergic/Immunologic: [ ] itchy eyes [ ] nasal discharge [ ] hives [ ] angioedema  [ ] All other systems negative  [ ] Unable to assess ROS because ________    OBJECTIVE:  ICU Vital Signs Last 24 Hrs  T(C): 36.7 (24 Sep 2019 13:57), Max: 37 (24 Sep 2019 02:01)  T(F): 98 (24 Sep 2019 13:57), Max: 98.6 (24 Sep 2019 02:01)  HR: 83 (24 Sep 2019 13:57) (81 - 99)  BP: 147/81 (24 Sep 2019 13:57) (135/78 - 166/83)  BP(mean): --  ABP: --  ABP(mean): --  RR: 17 (24 Sep 2019 13:57) (16 - 18)  SpO2: 98% (24 Sep 2019 13:57) (98% - 100%)        CAPILLARY BLOOD GLUCOSE      POCT Blood Glucose.: 167 mg/dL (24 Sep 2019 13:17)      PHYSICAL EXAM:    GENERAL: Morbid Obesity   HEAD:  Atraumatic, Normocephalic  EYES: EOMI, PERRLA, conjunctiva and sclera clear  NECK: Supple, No JVD, Normal thyroid  CHEST/LUNG: Mild inspiratory crackles  HEART: Regular rate and rhythm; No murmurs, rubs, or gallops  ABDOMEN: Distended and nontender , Bowel sounds present  NERVOUS SYSTEM:  Alert & Oriented X3, Good concentration; Motor Strength 5/5 B/L   EXTREMITIES: Bilateral lower extremity edema; no calf pain tenderness    HOSPITAL MEDICATIONS:  MEDICATIONS  (STANDING):  amLODIPine   Tablet 2.5 milliGRAM(s) Oral daily  ascorbic acid 500 milliGRAM(s) Oral daily  carvedilol 3.125 milliGRAM(s) Oral every 12 hours  chlorhexidine 2% Cloths 1 Application(s) Topical daily  clopidogrel Tablet 75 milliGRAM(s) Oral daily  epoetin cyndie Injectable 4000 Unit(s) IV Push <User Schedule>  ferrous    sulfate 325 milliGRAM(s) Oral daily  folic acid 1 milliGRAM(s) Oral daily  gabapentin 300 milliGRAM(s) Oral at bedtime  hydrALAZINE 50 milliGRAM(s) Oral three times a day  mirtazapine 7.5 milliGRAM(s) Oral at bedtime  pantoprazole    Tablet 40 milliGRAM(s) Oral before breakfast  PARoxetine 10 milliGRAM(s) Oral daily  risperiDONE   Tablet 1 milliGRAM(s) Oral at bedtime  sevelamer carbonate 800 milliGRAM(s) Oral three times a day with meals  simvastatin 40 milliGRAM(s) Oral at bedtime    MEDICATIONS  (PRN):  ALBUTerol    90 MICROgram(s) HFA Inhaler 2 Puff(s) Inhalation every 6 hours PRN Shortness of Breath      LABS:                        9.3    6.84  )-----------( 291      ( 24 Sep 2019 09:36 )             31.5     Hgb Trend: 9.3<--, 9.2<--, 9.3<--, 8.9<--, 9.7<--  09-24    134<L>  |  95<L>  |  65<H>  ----------------------------<  116<H>  5.7<H>   |  29  |  10.30<H>    Ca    9.2      24 Sep 2019 09:36  Phos  7.9     09-24  Mg     2.4     09-24      Creatinine Trend: 10.30<--, 12.20<--, 12.30<--, 9.83<--, 8.90<--, 9.56<--            MICROBIOLOGY:     RADIOLOGY:  [X] Reviewed and interpreted by me    Impression: Grossly stable pulmonary vascular congestion; underlying   pulmonary infiltrate/pneumonia cannot be excluded. This finding is   communicated with the emergency department via the PACS communication   system.    No evidence for pleural effusion or pneumothorax.    The trachea is midline.    Stable cardiac silhouette.  PULMONARY FUNCTION TESTS:    EKG:

## 2019-09-24 NOTE — PROGRESS NOTE ADULT - ASSESSMENT
Pt is a 39 years old male from home with PMHx of ESRD on MWF HD (via LUE AVF), DM (on insulin), cocaine abuse, on home O2 (4L NC), morbid obesity, Bipolar disorder, schizophrenia and HTN presents to the ED with chief complaint of shortness of breath for 1 day. as per pt the last dialysis session was on 9/20 , pt states to be complaint with fluid restriction. Pt denies any chest pain, palpitations, severe weakness or diaphoresis. Pt had multiple admissions in past for similar presentation requiring urgent dialysis.  Pt is being admitted to medicine for Urgent dialysis for pulmonary edema and hyperkalemia    09/24/2019    Pulmonary Edema  - Dyspnea at rest  - 4 L NC in bed  - HD in am of 09/24  - CXR shows only vascular congestion     Hyperkalemia (6.4)  - No EKG findings  - s/p cocktail --> 5.8  - f/u s/p HD on 09/24    ESRD  - Non compliant with HD  - Emergent HD 09/24/2019  - MWF Schedule    Diabetes  - Continue sliding scale  - HgbA1c 5.5    HTN  - Continue with home meds  - Hydralazine, Metoprolol, and DASH Diet    Depression/Schizophrenia  - Continue with Mirtazpine, Paroxetine, and Risperidone    DVT and GI PPx

## 2019-09-24 NOTE — CONSULT NOTE ADULT - ATTENDING COMMENTS
Pt known to me from prior hospitalizations and my clinic. Outpatient sleep study showed severe sleep apnea. He would be better optimized with an outpatient CPAP titration with a proper mask fitting to enhance compliance. Given that his KRYSTIN is severe and with his size , he may require higher pressures than an empiric Autopap machine can deliver and may not be effective. I discussed the options with the patient who is in agreement. He will call [facility] [place] to schedule an appointment after which a machine can be ordered.

## 2019-09-24 NOTE — CONSULT NOTE ADULT - ASSESSMENT
39 years old male from home with PMHx of ESRD on MWF HD (via LUE AVF), DM (on insulin), cocaine abuse, on home O2 (4L NC), morbid obesity, Bipolar disorder, schizophrenia, KRYSTIN-not on CPAP and HTN presents to the ED with chief complaint of shortness of breath x2 days.  1.Pulm HTN-add norvasc 2.5mg qd and needs tp get CPAP for KRYSTIN.  2.HTN-hx of cocaine use in past d/c lopressor and start coreg 3.125mg bid,cont hydralazine.  3.ESRD-HD as per renal.  4.DM-Insulin.  5.Cont plavix and statin.  6.Obesity-weight loss.  7.Schizophrenia-cont psych medication.  8.GI and DVT prophylaxis.
Patient is a 39y Male with ESRD on HD MWF. Was just discharged over the weekend. Brought to ED this AM by EMS after family called as he was short of breath. In ED found with pulmonary edema and hyperkalemia prompting a renal consult for emergent HD. He is currently seen on dialysis with improvement of SOB.    A/P:  #ESRD s/p urgent HD for pulm edema and hyperkalemia  #anemia of CKD cont procrit on HD   # HTN BP stable. Cont hydralazine, metoprolol,  # renal osteodystrophy. cont sevelamer on HD   # hyperkalemia: non -compliant with dietary potassium restriction. dialysed against a 1K+ bath    many thanks for the consult.
39 years old male PMHx of ESRD on HD, DM, on home O2 (4L NC), morbid obesity and HTN p/w SOB and worsening LE edema   patient is admitted with fluid overload needing urgent HD.

## 2019-09-24 NOTE — PROGRESS NOTE ADULT - ASSESSMENT
Patient is a 39y Male with ESRD on HD MWF. Was just discharged over the weekend. Brought to ED this AM by EMS after family called as he was short of breath. In ED found with pulmonary edema and hyperkalemia prompting a renal consult for emergent HD. He is currently seen on dialysis with improvement of SOB.    A/P:  #ESRD s/p urgent HD yesterday for pulm edema and hyperkalemia ultrafiltration today. HD in AM  #anemia of CKD cont procrit on HD   # HTN BP stable. Cont hydralazine, metoprolol,  # renal osteodystrophy. cont sevelamer on HD   # hyperkalemia: non -compliant with dietary potassium restriction. HD in am

## 2019-09-25 ENCOUNTER — TRANSCRIPTION ENCOUNTER (OUTPATIENT)
Age: 39
End: 2019-09-25

## 2019-09-25 VITALS
TEMPERATURE: 97 F | HEART RATE: 97 BPM | WEIGHT: 315 LBS | DIASTOLIC BLOOD PRESSURE: 78 MMHG | SYSTOLIC BLOOD PRESSURE: 172 MMHG | RESPIRATION RATE: 20 BRPM

## 2019-09-25 LAB
AMPHET UR-MCNC: NEGATIVE — SIGNIFICANT CHANGE UP
ANION GAP SERPL CALC-SCNC: 13 MMOL/L — SIGNIFICANT CHANGE UP (ref 5–17)
BARBITURATES UR SCN-MCNC: NEGATIVE — SIGNIFICANT CHANGE UP
BASOPHILS # BLD AUTO: 0.04 K/UL — SIGNIFICANT CHANGE UP (ref 0–0.2)
BASOPHILS NFR BLD AUTO: 0.5 % — SIGNIFICANT CHANGE UP (ref 0–2)
BENZODIAZ UR-MCNC: NEGATIVE — SIGNIFICANT CHANGE UP
BUN SERPL-MCNC: 75 MG/DL — HIGH (ref 7–18)
CALCIUM SERPL-MCNC: 9 MG/DL — SIGNIFICANT CHANGE UP (ref 8.4–10.5)
CHLORIDE SERPL-SCNC: 93 MMOL/L — LOW (ref 96–108)
CO2 SERPL-SCNC: 28 MMOL/L — SIGNIFICANT CHANGE UP (ref 22–31)
COCAINE METAB.OTHER UR-MCNC: POSITIVE
CREAT SERPL-MCNC: 11.8 MG/DL — HIGH (ref 0.5–1.3)
EOSINOPHIL # BLD AUTO: 0.62 K/UL — HIGH (ref 0–0.5)
EOSINOPHIL NFR BLD AUTO: 7.7 % — HIGH (ref 0–6)
GLUCOSE BLDC GLUCOMTR-MCNC: 137 MG/DL — HIGH (ref 70–99)
GLUCOSE BLDC GLUCOMTR-MCNC: 145 MG/DL — HIGH (ref 70–99)
GLUCOSE SERPL-MCNC: 110 MG/DL — HIGH (ref 70–99)
HCT VFR BLD CALC: 32 % — LOW (ref 39–50)
HGB BLD-MCNC: 9.4 G/DL — LOW (ref 13–17)
IMM GRANULOCYTES NFR BLD AUTO: 0.4 % — SIGNIFICANT CHANGE UP (ref 0–1.5)
LYMPHOCYTES # BLD AUTO: 0.94 K/UL — LOW (ref 1–3.3)
LYMPHOCYTES # BLD AUTO: 11.6 % — LOW (ref 13–44)
MAGNESIUM SERPL-MCNC: 2.5 MG/DL — SIGNIFICANT CHANGE UP (ref 1.6–2.6)
MCHC RBC-ENTMCNC: 26.6 PG — LOW (ref 27–34)
MCHC RBC-ENTMCNC: 29.4 GM/DL — LOW (ref 32–36)
MCV RBC AUTO: 90.4 FL — SIGNIFICANT CHANGE UP (ref 80–100)
METHADONE UR-MCNC: NEGATIVE — SIGNIFICANT CHANGE UP
MONOCYTES # BLD AUTO: 1.39 K/UL — HIGH (ref 0–0.9)
MONOCYTES NFR BLD AUTO: 17.2 % — HIGH (ref 2–14)
NEUTROPHILS # BLD AUTO: 5.08 K/UL — SIGNIFICANT CHANGE UP (ref 1.8–7.4)
NEUTROPHILS NFR BLD AUTO: 62.6 % — SIGNIFICANT CHANGE UP (ref 43–77)
NRBC # BLD: 0 /100 WBCS — SIGNIFICANT CHANGE UP (ref 0–0)
OPIATES UR-MCNC: NEGATIVE — SIGNIFICANT CHANGE UP
PCP SPEC-MCNC: SIGNIFICANT CHANGE UP
PCP UR-MCNC: NEGATIVE — SIGNIFICANT CHANGE UP
PHOSPHATE SERPL-MCNC: 8.5 MG/DL — HIGH (ref 2.5–4.5)
PLATELET # BLD AUTO: 283 K/UL — SIGNIFICANT CHANGE UP (ref 150–400)
POTASSIUM SERPL-MCNC: 5 MMOL/L — SIGNIFICANT CHANGE UP (ref 3.5–5.3)
POTASSIUM SERPL-SCNC: 5 MMOL/L — SIGNIFICANT CHANGE UP (ref 3.5–5.3)
RBC # BLD: 3.54 M/UL — LOW (ref 4.2–5.8)
RBC # FLD: 18.5 % — HIGH (ref 10.3–14.5)
SODIUM SERPL-SCNC: 134 MMOL/L — LOW (ref 135–145)
THC UR QL: NEGATIVE — SIGNIFICANT CHANGE UP
WBC # BLD: 8.1 K/UL — SIGNIFICANT CHANGE UP (ref 3.8–10.5)
WBC # FLD AUTO: 8.1 K/UL — SIGNIFICANT CHANGE UP (ref 3.8–10.5)

## 2019-09-25 RX ORDER — METOPROLOL TARTRATE 50 MG
1 TABLET ORAL
Qty: 0 | Refills: 0 | DISCHARGE

## 2019-09-25 RX ORDER — CARVEDILOL PHOSPHATE 80 MG/1
6.25 CAPSULE, EXTENDED RELEASE ORAL EVERY 12 HOURS
Refills: 0 | Status: DISCONTINUED | OUTPATIENT
Start: 2019-09-25 | End: 2019-09-25

## 2019-09-25 RX ORDER — AMLODIPINE BESYLATE 2.5 MG/1
1 TABLET ORAL
Qty: 30 | Refills: 0
Start: 2019-09-25 | End: 2019-10-24

## 2019-09-25 RX ORDER — AMLODIPINE BESYLATE 2.5 MG/1
1 TABLET ORAL
Qty: 0 | Refills: 0 | DISCHARGE

## 2019-09-25 RX ADMIN — CARVEDILOL PHOSPHATE 3.12 MILLIGRAM(S): 80 CAPSULE, EXTENDED RELEASE ORAL at 06:22

## 2019-09-25 RX ADMIN — Medication 325 MILLIGRAM(S): at 12:00

## 2019-09-25 RX ADMIN — SEVELAMER CARBONATE 800 MILLIGRAM(S): 2400 POWDER, FOR SUSPENSION ORAL at 09:00

## 2019-09-25 RX ADMIN — CLOPIDOGREL BISULFATE 75 MILLIGRAM(S): 75 TABLET, FILM COATED ORAL at 12:01

## 2019-09-25 RX ADMIN — AMLODIPINE BESYLATE 2.5 MILLIGRAM(S): 2.5 TABLET ORAL at 06:22

## 2019-09-25 RX ADMIN — Medication 10 MILLIGRAM(S): at 12:01

## 2019-09-25 RX ADMIN — Medication 500 MILLIGRAM(S): at 12:01

## 2019-09-25 RX ADMIN — Medication 1 MILLIGRAM(S): at 12:00

## 2019-09-25 RX ADMIN — Medication 50 MILLIGRAM(S): at 06:22

## 2019-09-25 RX ADMIN — ERYTHROPOIETIN 4000 UNIT(S): 10000 INJECTION, SOLUTION INTRAVENOUS; SUBCUTANEOUS at 16:22

## 2019-09-25 RX ADMIN — PANTOPRAZOLE SODIUM 40 MILLIGRAM(S): 20 TABLET, DELAYED RELEASE ORAL at 06:22

## 2019-09-25 RX ADMIN — CARVEDILOL PHOSPHATE 6.25 MILLIGRAM(S): 80 CAPSULE, EXTENDED RELEASE ORAL at 18:38

## 2019-09-25 RX ADMIN — SEVELAMER CARBONATE 800 MILLIGRAM(S): 2400 POWDER, FOR SUSPENSION ORAL at 12:01

## 2019-09-25 NOTE — DISCHARGE NOTE PROVIDER - PROVIDER TOKENS
PROVIDER:[TOKEN:[[id number]],FOLLOWUP:[1 week]],PROVIDER:[TOKEN:[[id number]],FOLLOWUP:[1 week]],PROVIDER:[TOKEN:[[id number]],FOLLOWUP:[1 week]],PROVIDER:[TOKEN:[[id number]]]

## 2019-09-25 NOTE — DISCHARGE NOTE PROVIDER - HOSPITAL COURSE
Hospital Course: 09/23/2019 - 09/25/2019        39 years old male from home with PMHx of ESRD on MWF HD (via LUE AVF), DM (on insulin), cocaine abuse, on home O2 (4L NC), morbid obesity, Bipolar disorder, schizophrenia and HTN presented to the ED with the chief complaint of shortness of breath x2 days and worsening LE edema likely from fluid overload in setting of ESRD. Patient stated he last completed HD on 09/20/19. Patient also complained of abdominal pain in the umbilical region, which was non-radiating and intermittent in nature. Patient had multiple recent admissions in the last few months for fluid overload needing urgent dialysis. He denied any chest pain, palpitations, nausea, vomiting, change in bowel or urinary habits. Patient was admitted with fluid overload needing urgent HD.          Patient was admitted for emergent Hemodialysis and received two sessions of HD on 09/24 and 09/25 with much improvement in his overall volume status. Patient was reportedly non-compliant with his HD and was informed to be much more compliant in the future as to limit these detrimental episodes.         Patient was also seen by pulmonology for evaluation of his pulmonary hypertension and presumed obstructive sleep apnea. This was based on an ECHO that was previously done showing G2 Diastolic Dysfunction and pulmonary HTN. Pulmonology (Dr. Thurston) made the evaluation that it was caused secondary to KRYSTIN and the patient would benefit from CPAP. The patient is to follow up outpatient with Pulmonology (Dr. Thurston) about obtaining CPAP and getting better pulmonary HTN control.         As a result of the fluid overload, ECHO results, and Pulm HTN diagnosis the patient was seen by Cardiology (Dr. Bob). Cardiology concluded that for the pulmonary HTN the patient should have Amlodipine 2.5 mg daily added to his regimen, including Carvedilol 3.125 BID with his Hydralazine 25 mg TID for his CHF and HTN control. Patient was also recommended to discontinue his Lopressor since it was found on urine studies he was found to be using cocaine. Patient was strongly recommended to discontinue his use of cocaine and follow-up with Cardiology for better BP control.         Patient was deemed improved and fit for discharge with the prescription of a roll-ator to support his mobility outpatient. Patient to be seen by his PCP (Dr. Andino) for further healthcare maintenance in the future. Hospital Course: 09/23/2019 - 09/25/2019        39 years old male from home with PMHx of ESRD on MWF HD (via LUE AVF), DM (on insulin), cocaine abuse, on home O2 (4L NC), morbid obesity, Bipolar disorder, schizophrenia and HTN presented to the ED with the chief complaint of shortness of breath x2 days and worsening LE edema likely from fluid overload in setting of ESRD. Patient stated he last completed HD on 09/20/19. Patient also complained of abdominal pain in the umbilical region, which was non-radiating and intermittent in nature. Patient had multiple recent admissions in the last few months for fluid overload needing urgent dialysis. He denied any chest pain, palpitations, nausea, vomiting, change in bowel or urinary habits. Patient was admitted with fluid overload needing urgent HD.          Patient was admitted for emergent Hemodialysis and received two sessions of HD on 09/24 and 09/25 with much improvement in his overall volume status. Patient was reportedly non-compliant with his HD and was informed to be much more compliant in the future as to limit these detrimental episodes.         Patient was also seen by pulmonology for evaluation of his pulmonary hypertension and presumed obstructive sleep apnea. This was based on an ECHO that was previously done showing G2 Diastolic Dysfunction and pulmonary HTN. Pulmonology (Dr. Thurston) made the evaluation that it was caused secondary to KRYSTIN and the patient would benefit from CPAP. The patient is to follow up outpatient with Pulmonology (Dr. Thurston) about obtaining CPAP and getting better pulmonary HTN control.         As a result of the fluid overload, ECHO results, and Pulm HTN diagnosis the patient was seen by Cardiology (Dr. Bob). Cardiology concluded that for the pulmonary HTN the patient should have Amlodipine 2.5 mg daily added to his regimen, including Carvedilol 3.125 BID with his Hydralazine 25 mg TID for his CHF and HTN control. Patient was also recommended to discontinue his Lopressor since it was found on urine studies he was found to be using cocaine. Patient was strongly recommended to discontinue his use of cocaine and follow-up with Cardiology for better BP control.         Patient health was deemed improved and fit for discharge with the prescription of a roll-ator to support his mobility outpatient. Patient to be seen by his PCP (Dr. Andino) for further healthcare maintenance in the future.

## 2019-09-25 NOTE — PROGRESS NOTE ADULT - ASSESSMENT
seen and examined vstable afebrile  physical unchaged   comfortable on bed  not in any distress     labs noted  urine tox  Cocain  pt is morbid obese, esrd , cocain abuser, sleep apnea dint go for SA studies  poor prognosis  d/w pt s mother  a spt has bipolar /schizho, not seeing psych very regularily  sudden death  pt and her mother advised pt to involve in wt reduction teaching  diet exercise  bariatric

## 2019-09-25 NOTE — PROGRESS NOTE ADULT - SUBJECTIVE AND OBJECTIVE BOX
CHIEF COMPLAINT:Patient is a 39y old  Male who presents with a chief complaint of Shortness of breath.Pt appears comfortable.    	  REVIEW OF SYSTEMS:  CONSTITUTIONAL: No fever, weight loss, or fatigue  EYES: No eye pain, visual disturbances, or discharge  ENT:  No difficulty hearing, tinnitus, vertigo; No sinus or throat pain  NECK: No pain or stiffness  RESPIRATORY: No cough, wheezing, chills or hemoptysis; No Shortness of Breath  CARDIOVASCULAR: No chest pain, palpitations, passing out, dizziness, or leg swelling  GASTROINTESTINAL: No abdominal or epigastric pain. No nausea, vomiting, or hematemesis; No diarrhea or constipation. No melena or hematochezia.  GENITOURINARY: No dysuria, frequency, hematuria, or incontinence  NEUROLOGICAL: No headaches, memory loss, loss of strength, numbness, or tremors  SKIN: No itching, burning, rashes, or lesions   LYMPH Nodes: No enlarged glands  ENDOCRINE: No heat or cold intolerance; No hair loss  MUSCULOSKELETAL: No joint pain or swelling; No muscle, back, or extremity pain  PSYCHIATRIC: No depression, anxiety, mood swings, or difficulty sleeping  HEME/LYMPH: No easy bruising, or bleeding gums  ALLERGY AND IMMUNOLOGIC: No hives or eczema	    PHYSICAL EXAM:  T(C): 36.9 (09-25-19 @ 05:09), Max: 37.7 (09-25-19 @ 01:05)  HR: 95 (09-25-19 @ 05:09) (83 - 99)  BP: 142/80 (09-25-19 @ 05:09) (132/76 - 147/81)  RR: 18 (09-25-19 @ 05:09) (16 - 18)  SpO2: 98% (09-25-19 @ 05:09) (98% - 98%)    I&O's Summary    24 Sep 2019 07:01  -  25 Sep 2019 07:00  --------------------------------------------------------  IN: 0 mL / OUT: 500 mL / NET: -500 mL        Appearance: Normal	  HEENT:   Normal oral mucosa, PERRL, EOMI	  Lymphatic: No lymphadenopathy  Cardiovascular: Normal S1 S2, No JVD, No murmurs, No edema  Respiratory: Lungs clear to auscultation	  Psychiatry: A & O x 3, Mood & affect appropriate  Gastrointestinal:  Soft, Non-tender, + BS	  Skin: No rashes, No ecchymoses, No cyanosis	  Neurologic: Non-focal  Extremities: Normal range of motion, No clubbing, cyanosis or edema      MEDICATIONS  (STANDING):  acetaminophen  IVPB .. 1000 milliGRAM(s) IV Intermittent once  amLODIPine   Tablet 2.5 milliGRAM(s) Oral daily  ascorbic acid 500 milliGRAM(s) Oral daily  carvedilol 3.125 milliGRAM(s) Oral every 12 hours  chlorhexidine 2% Cloths 1 Application(s) Topical daily  clopidogrel Tablet 75 milliGRAM(s) Oral daily  epoetin cyndie Injectable 4000 Unit(s) IV Push <User Schedule>  ferrous    sulfate 325 milliGRAM(s) Oral daily  folic acid 1 milliGRAM(s) Oral daily  gabapentin 300 milliGRAM(s) Oral at bedtime  hydrALAZINE 50 milliGRAM(s) Oral three times a day  mirtazapine 7.5 milliGRAM(s) Oral at bedtime  pantoprazole    Tablet 40 milliGRAM(s) Oral before breakfast  PARoxetine 10 milliGRAM(s) Oral daily  risperiDONE   Tablet 1 milliGRAM(s) Oral at bedtime  sevelamer carbonate 800 milliGRAM(s) Oral three times a day with meals  simvastatin 40 milliGRAM(s) Oral at bedtime        	  LABS:	 	                       9.4    8.10  )-----------( 283      ( 25 Sep 2019 06:23 )             32.0     09-25    134<L>  |  93<L>  |  75<H>  ----------------------------<  110<H>  5.0   |  28  |  11.80<H>    Ca    9.0      25 Sep 2019 06:23  Phos  8.5     09-25  Mg     2.5     09-25      proBNP: Serum Pro-Brain Natriuretic Peptide: 35564 pg/mL (09-23 @ 01:01)  Serum Pro-Brain Natriuretic Peptide: 62951 pg/mL (09-18 @ 01:46)    TSH: Thyroid Stimulating Hormone, Serum: 5.03 uU/mL (09-24 @ 15:01)      OBSERVATIONS:  Mitral Valve: Normal mitral valve. Mild mitral  regurgitation.  Aortic Root: Aortic Root: 3.5 cm.    Aortic Valve: Normal trileaflet aortic valve.  Left Atrium: Normal left atrium.  LA volume index = 22  cc/m2.  Left Ventricle: Endocardium not well visualized; grossly  normal left ventricular systolic function. Mild concentric  left ventricular hypertrophy. Grade III diastolic  dysfunction.  Right Heart: Normal right atrium. Normal right ventricular  size and systolic function (TAPSE  3.0cm). There is mild  tricuspid regurgitation. There is mild pulmonic  regurgitation.  Pericardium/PleuraTrivial pericardial effusion is seen.  Hemodynamic: RV systolic pressure is mildly increased at  44 mm Hg.    Cocaine Metabolite, Urine: Positive (09.25.19 @ 07:02)
HPI:  39 years old male from home with PMHx of ESRD on MWF HD (via LUE AVF), DM (on insulin), cocaine abuse, on home O2 (4L NC), morbid obesity, Bipolar disorder, schizophrenia and HTN presents to the ED with chief complaint of shortness of breath x2 days and worsening LE edema likely from fluid overload in setting of ESRD. per patient he completed HD on 09/16/19. Patient is also complaining of abdominal pain in umbilical region. Which is non-radiating and intermittent in nature. Patient has multiple recent addmisson in last few months for fluid overload needing urgent dialysis.   Pt denies any chest pain, palpitations, nausea, vomiting, change in bowel or urinary habits.  patient is admitted with fluid overload needing urgent HD. (23 Sep 2019 04:29)      Patient is a 39y old  Male who presents with a chief complaint of Dyspnea (25 Sep 2019 14:38)      INTERVAL HPI/OVERNIGHT EVENTS:  T(C): 36.3 (09-25-19 @ 13:45), Max: 37.7 (09-25-19 @ 01:05)  HR: 90 (09-25-19 @ 13:45) (88 - 99)  BP: 147/78 (09-25-19 @ 13:45) (132/76 - 150/78)  RR: 18 (09-25-19 @ 13:45) (16 - 18)  SpO2: 97% (09-25-19 @ 13:45) (97% - 99%)  Wt(kg): --  I&O's Summary    24 Sep 2019 07:01  -  25 Sep 2019 07:00  --------------------------------------------------------  IN: 0 mL / OUT: 500 mL / NET: -500 mL        REVIEW OF SYSTEMS: denies fever, chills, SOB, palpitations, chest pain, abdominal pain, nausea, vomitting, diarrhea, constipation, dizziness    MEDICATIONS  (STANDING):  acetaminophen  IVPB .. 1000 milliGRAM(s) IV Intermittent once  amLODIPine   Tablet 2.5 milliGRAM(s) Oral daily  ascorbic acid 500 milliGRAM(s) Oral daily  carvedilol 6.25 milliGRAM(s) Oral every 12 hours  chlorhexidine 2% Cloths 1 Application(s) Topical daily  clopidogrel Tablet 75 milliGRAM(s) Oral daily  epoetin cyndie Injectable 4000 Unit(s) IV Push <User Schedule>  ferrous    sulfate 325 milliGRAM(s) Oral daily  folic acid 1 milliGRAM(s) Oral daily  gabapentin 300 milliGRAM(s) Oral at bedtime  hydrALAZINE 50 milliGRAM(s) Oral three times a day  mirtazapine 7.5 milliGRAM(s) Oral at bedtime  pantoprazole    Tablet 40 milliGRAM(s) Oral before breakfast  PARoxetine 10 milliGRAM(s) Oral daily  risperiDONE   Tablet 1 milliGRAM(s) Oral at bedtime  sevelamer carbonate 800 milliGRAM(s) Oral three times a day with meals  simvastatin 40 milliGRAM(s) Oral at bedtime    MEDICATIONS  (PRN):  ALBUTerol    90 MICROgram(s) HFA Inhaler 2 Puff(s) Inhalation every 6 hours PRN Shortness of Breath  oxyCODONE    5 mG/acetaminophen 325 mG 1 Tablet(s) Oral every 6 hours PRN Severe Pain (7 - 10)      PHYSICAL EXAM:  GENERAL: NAD, well-groomed, well-developed  HEAD:  Atraumatic, Normocephalic  EYES: EOMI, PERRLA, conjunctiva and sclera clear  ENMT: No tonsillar erythema, exudates, or enlargement; Moist mucous membranes, Good dentition, No lesions  NECK: Supple, No JVD, Normal thyroid  NERVOUS SYSTEM:  Alert & Oriented X3, Good concentration; Motor Strength 5/5 B/L upper and lower extremities; DTRs 2+ intact and symmetric  CHEST/LUNG: Clear to percussion bilaterally; No rales, rhonchi, wheezing, or rubs  HEART: Regular rate and rhythm; No murmurs, rubs, or gallops  ABDOMEN: Soft, Nontender, Nondistended; Bowel sounds present  EXTREMITIES:  2+ Peripheral Pulses, No clubbing, cyanosis, or edema  LYMPH: No lymphadenopathy noted  SKIN: No rashes or lesions  LABS:                        9.4    8.10  )-----------( 283      ( 25 Sep 2019 06:23 )             32.0     09-25    134<L>  |  93<L>  |  75<H>  ----------------------------<  110<H>  5.0   |  28  |  11.80<H>    Ca    9.0      25 Sep 2019 06:23  Phos  8.5     09-25  Mg     2.5     09-25          CAPILLARY BLOOD GLUCOSE      POCT Blood Glucose.: 145 mg/dL (25 Sep 2019 11:41)  POCT Blood Glucose.: 137 mg/dL (25 Sep 2019 07:56)  POCT Blood Glucose.: 123 mg/dL (24 Sep 2019 22:12)  POCT Blood Glucose.: 201 mg/dL (24 Sep 2019 17:27)
HPI:  39 years old male from home with PMHx of ESRD on MWF HD (via LUE AVF), DM (on insulin), cocaine abuse, on home O2 (4L NC), morbid obesity, Bipolar disorder, schizophrenia and HTN presents to the ED with chief complaint of shortness of breath x2 days and worsening LE edema likely from fluid overload in setting of ESRD. per patient he completed HD on 09/16/19. Patient is also complaining of abdominal pain in umbilical region. Which is non-radiating and intermittent in nature. Patient has multiple recent addmisson in last few months for fluid overload needing urgent dialysis.   Pt denies any chest pain, palpitations, nausea, vomiting, change in bowel or urinary habits.  patient is admitted with fluid overload needing urgent HD. (23 Sep 2019 04:29)      Patient is a 39y old  Male who presents with a chief complaint of Shortness of breath (23 Sep 2019 04:29)      INTERVAL HPI/OVERNIGHT EVENTS:  T(C): 36.5 (09-23-19 @ 09:55), Max: 37.3 (09-23-19 @ 00:03)  HR: 75 (09-23-19 @ 09:55) (75 - 96)  BP: 140/82 (09-23-19 @ 09:55) (140/82 - 150/72)  RR: 21 (09-23-19 @ 09:55) (17 - 21)  SpO2: 100% (09-23-19 @ 09:55) (98% - 100%)  Wt(kg): --  I&O's Summary      REVIEW OF SYSTEMS: denies fever, chills, SOB, palpitations, chest pain, abdominal pain, nausea, vomitting, diarrhea, constipation, dizziness    MEDICATIONS  (STANDING):  ascorbic acid 500 milliGRAM(s) Oral daily  clopidogrel Tablet 75 milliGRAM(s) Oral daily  epoetin cyndie Injectable 4000 Unit(s) IV Push <User Schedule>  ferrous    sulfate 325 milliGRAM(s) Oral daily  folic acid 1 milliGRAM(s) Oral daily  gabapentin 300 milliGRAM(s) Oral at bedtime  hydrALAZINE 50 milliGRAM(s) Oral three times a day  metoprolol tartrate 25 milliGRAM(s) Oral two times a day  mirtazapine 7.5 milliGRAM(s) Oral at bedtime  pantoprazole    Tablet 40 milliGRAM(s) Oral before breakfast  PARoxetine 10 milliGRAM(s) Oral daily  risperiDONE   Tablet 1 milliGRAM(s) Oral at bedtime  sevelamer carbonate 800 milliGRAM(s) Oral three times a day with meals  simvastatin 40 milliGRAM(s) Oral at bedtime    MEDICATIONS  (PRN):  ALBUTerol    90 MICROgram(s) HFA Inhaler 2 Puff(s) Inhalation every 6 hours PRN Shortness of Breath      PHYSICAL EXAM:  GENERAL: NAD, well-groomed, well-developed  HEAD:  Atraumatic, Normocephalic  EYES: EOMI, PERRLA, conjunctiva and sclera clear  ENMT: No tonsillar erythema, exudates, or enlargement; Moist mucous membranes, Good dentition, No lesions  NECK: Supple, No JVD, Normal thyroid  NERVOUS SYSTEM:  Alert & Oriented X3, Good concentration; Motor Strength 5/5 B/L upper and lower extremities; DTRs 2+ intact and symmetric  CHEST/LUNG: Clear to percussion bilaterally; No rales, rhonchi, wheezing, or rubs  HEART: Regular rate and rhythm; No murmurs, rubs, or gallops  ABDOMEN: Soft, Nontender, Nondistended; Bowel sounds present  EXTREMITIES:  2+ Peripheral Pulses, No clubbing, cyanosis, or edema  LYMPH: No lymphadenopathy noted  SKIN: No rashes or lesions  LABS:                        9.2    7.87  )-----------( 257      ( 23 Sep 2019 01:01 )             30.9     09-23    135  |  96  |  85<H>  ----------------------------<  74  5.8<H>   |  26  |  12.20<H>    Ca    9.4      23 Sep 2019 04:27          CAPILLARY BLOOD GLUCOSE      POCT Blood Glucose.: 74 mg/dL (23 Sep 2019 08:41)
HPI:  39 years old male from home with PMHx of ESRD on MWF HD (via LUE AVF), DM (on insulin), cocaine abuse, on home O2 (4L NC), morbid obesity, Bipolar disorder, schizophrenia and HTN presents to the ED with chief complaint of shortness of breath x2 days and worsening LE edema likely from fluid overload in setting of ESRD. per patient he completed HD on 09/16/19. Patient is also complaining of abdominal pain in umbilical region. Which is non-radiating and intermittent in nature. Patient has multiple recent addmisson in last few months for fluid overload needing urgent dialysis.   Pt denies any chest pain, palpitations, nausea, vomiting, change in bowel or urinary habits.  patient is admitted with fluid overload needing urgent HD. (23 Sep 2019 04:29)      Patient is a 39y old  Male who presents with a chief complaint of Shortness of breath (24 Sep 2019 12:30)      INTERVAL HPI/OVERNIGHT EVENTS:  T(C): 36.7 (09-24-19 @ 12:10), Max: 37 (09-24-19 @ 02:01)  HR: 85 (09-24-19 @ 12:10) (81 - 99)  BP: 140/74 (09-24-19 @ 12:10) (131/63 - 166/83)  RR: 18 (09-24-19 @ 12:10) (16 - 19)  SpO2: 98% (09-24-19 @ 02:01) (98% - 100%)  Wt(kg): --  I&O's Summary      REVIEW OF SYSTEMS: denies fever, chills, SOB, palpitations, chest pain, abdominal pain, nausea, vomitting, diarrhea, constipation, dizziness    MEDICATIONS  (STANDING):  ascorbic acid 500 milliGRAM(s) Oral daily  chlorhexidine 2% Cloths 1 Application(s) Topical daily  clopidogrel Tablet 75 milliGRAM(s) Oral daily  epoetin cyndie Injectable 4000 Unit(s) IV Push <User Schedule>  ferrous    sulfate 325 milliGRAM(s) Oral daily  folic acid 1 milliGRAM(s) Oral daily  gabapentin 300 milliGRAM(s) Oral at bedtime  hydrALAZINE 50 milliGRAM(s) Oral three times a day  metoprolol tartrate 25 milliGRAM(s) Oral two times a day  mirtazapine 7.5 milliGRAM(s) Oral at bedtime  pantoprazole    Tablet 40 milliGRAM(s) Oral before breakfast  PARoxetine 10 milliGRAM(s) Oral daily  risperiDONE   Tablet 1 milliGRAM(s) Oral at bedtime  sevelamer carbonate 800 milliGRAM(s) Oral three times a day with meals  simvastatin 40 milliGRAM(s) Oral at bedtime    MEDICATIONS  (PRN):  ALBUTerol    90 MICROgram(s) HFA Inhaler 2 Puff(s) Inhalation every 6 hours PRN Shortness of Breath      PHYSICAL EXAM:  GENERAL: NAD, well-groomed, well-developed  HEAD:  Atraumatic, Normocephalic  EYES: EOMI, PERRLA, conjunctiva and sclera clear  ENMT: No tonsillar erythema, exudates, or enlargement; Moist mucous membranes, Good dentition, No lesions  NECK: Supple, No JVD, Normal thyroid  NERVOUS SYSTEM:  Alert & Oriented X3, Good concentration; Motor Strength 5/5 B/L upper and lower extremities; DTRs 2+ intact and symmetric  CHEST/LUNG: Clear to percussion bilaterally; No rales, rhonchi, wheezing, or rubs  HEART: Regular rate and rhythm; No murmurs, rubs, or gallops  ABDOMEN: Soft, Nontender, Nondistended; Bowel sounds present  EXTREMITIES:  2+ Peripheral Pulses, No clubbing, cyanosis, or edema  LYMPH: No lymphadenopathy noted  SKIN: No rashes or lesions  LABS:                        9.3    6.84  )-----------( 291      ( 24 Sep 2019 09:36 )             31.5     09-24    134<L>  |  95<L>  |  65<H>  ----------------------------<  116<H>  5.7<H>   |  29  |  10.30<H>    Ca    9.2      24 Sep 2019 09:36  Phos  7.9     09-24  Mg     2.4     09-24          CAPILLARY BLOOD GLUCOSE      POCT Blood Glucose.: 126 mg/dL (24 Sep 2019 07:51)  POCT Blood Glucose.: 137 mg/dL (23 Sep 2019 14:32)
[facility] : Progress Note :: [phone], (office [phone]),   Dr La / Dr Hui / Dr Barber / Dr Villalobos / Dr Hang CASTELLANO / Dr Mehta / Dr Sanchez / Dr Alber dumont  _____________________________________________________________________________________________    seen on HD- ultrafiltration only.    aspirin (Short breath)  aspirin (Swelling)  penicillin (Short breath)  penicillins (Swelling)  shellfish (Unknown)    Hospital Medications:   MEDICATIONS  (STANDING):  ascorbic acid 500 milliGRAM(s) Oral daily  chlorhexidine 2% Cloths 1 Application(s) Topical daily  clopidogrel Tablet 75 milliGRAM(s) Oral daily  epoetin cyndie Injectable 4000 Unit(s) IV Push <User Schedule>  ferrous    sulfate 325 milliGRAM(s) Oral daily  folic acid 1 milliGRAM(s) Oral daily  gabapentin 300 milliGRAM(s) Oral at bedtime  hydrALAZINE 50 milliGRAM(s) Oral three times a day  metoprolol tartrate 25 milliGRAM(s) Oral two times a day  mirtazapine 7.5 milliGRAM(s) Oral at bedtime  pantoprazole    Tablet 40 milliGRAM(s) Oral before breakfast  PARoxetine 10 milliGRAM(s) Oral daily  risperiDONE   Tablet 1 milliGRAM(s) Oral at bedtime  sevelamer carbonate 800 milliGRAM(s) Oral three times a day with meals  simvastatin 40 milliGRAM(s) Oral at bedtime        VITALS:  T(F): 98.1 (09-24-19 @ 12:10), Max: 98.6 (09-24-19 @ 02:01)  HR: 85 (09-24-19 @ 12:10)  BP: 140/74 (09-24-19 @ 12:10)  RR: 18 (09-24-19 @ 12:10)  SpO2: 98% (09-24-19 @ 02:01)  Wt(kg): --      PHYSICAL EXAM:  Constitutional: NAD  HEENT: anicteric sclera, oropharynx clear.  Neck: No JVD  Respiratory: CTAB, no wheezes, rales or rhonchi  Cardiovascular: S1, S2, RRR  Gastrointestinal: BS+, soft, NT/ND  Extremities:  peripheral edema++  Neurological: A/O x 3, no focal deficits  Vascular Access: AVF canulated    LABS:  09-24    134<L>  |  95<L>  |  65<H>  ----------------------------<  116<H>  5.7<H>   |  29  |  10.30<H>    Ca    9.2      24 Sep 2019 09:36  Phos  7.9     09-24  Mg     2.4     09-24      Creatinine Trend: 10.30 <--, 12.20 <--, 12.30 <--, 9.83 <--, 8.90 <--, 9.56 <--                        9.3    6.84  )-----------( 291      ( 24 Sep 2019 09:36 )             31.5     Urine Studies:      RADIOLOGY & ADDITIONAL STUDIES:
PGY1 Note discussed with supervising resident and primary attending.    Patient is a 39y old  Male who presents with a chief complaint of Shortness of breath (23 Sep 2019 14:26)    INTERVAL HPI/OVERNIGHT EVENTS:  - Patient stated that he had significant amount of anxiety last night secondary to his claustrophobia  - Patient also endorsed bilateral lower extremity pain he states occurs with ambulation but this time was happening at rest in the face of worsening LE edema  - Patient also noted to have mild resting respiratory difficulty that was waxing and waning throughout the night  - Patient scheduled to get emergent HD  - Patient was hemodynamcially stable and afebrile    Of note patient had: 1) Negative Troponins, 2) K+ 6.4 that reduced to 5.8 s/p K+ cocktail, 3) BNP of 31,000    MEDICATIONS  (STANDING):  ascorbic acid 500 milliGRAM(s) Oral daily  clopidogrel Tablet 75 milliGRAM(s) Oral daily  epoetin cyndie Injectable 4000 Unit(s) IV Push <User Schedule>  ferrous    sulfate 325 milliGRAM(s) Oral daily  folic acid 1 milliGRAM(s) Oral daily  gabapentin 300 milliGRAM(s) Oral at bedtime  hydrALAZINE 50 milliGRAM(s) Oral three times a day  metoprolol tartrate 25 milliGRAM(s) Oral two times a day  mirtazapine 7.5 milliGRAM(s) Oral at bedtime  pantoprazole    Tablet 40 milliGRAM(s) Oral before breakfast  PARoxetine 10 milliGRAM(s) Oral daily  risperiDONE   Tablet 1 milliGRAM(s) Oral at bedtime  sevelamer carbonate 800 milliGRAM(s) Oral three times a day with meals  simvastatin 40 milliGRAM(s) Oral at bedtime    MEDICATIONS  (PRN):  ALBUTerol    90 MICROgram(s) HFA Inhaler 2 Puff(s) Inhalation every 6 hours PRN Shortness of Breath    Allergies    aspirin (Short breath)  aspirin (Swelling)  penicillin (Short breath)  penicillins (Swelling)  shellfish (Unknown)    Intolerances    REVIEW OF SYSTEMS:  CONSTITUTIONAL: Anxiety and weakness  RESPIRATORY: Short of breath  CARDIOVASCULAR: No chest pain, palpitations, dizziness, or leg swelling  GASTROINTESTINAL: No abdominal or epigastric pain. No nausea, vomiting, or hematemesis; No diarrhea or constipation. No melena or hematochezia.  NEUROLOGICAL: No headaches, memory loss, loss of strength, numbness, or tremors  SKIN: Pain in legs and swollen    Vital Signs Last 24 Hrs  T(C): 36.8 (24 Sep 2019 09:10), Max: 37 (24 Sep 2019 02:01)  T(F): 98.2 (24 Sep 2019 09:10), Max: 98.6 (24 Sep 2019 02:01)  HR: 81 (24 Sep 2019 09:10) (75 - 99)  BP: 135/78 (24 Sep 2019 09:10) (131/63 - 166/83)  BP(mean): --  RR: 18 (24 Sep 2019 09:10) (16 - 21)  SpO2: 98% (24 Sep 2019 02:01) (98% - 100%)    PHYSICAL EXAM:  GENERAL: Obese with multiple leg tattoos  HEAD:  Atraumatic, Normocephalic  EYES: EOMI, PERRLA, conjunctiva and sclera clear  NECK: Supple, No JVD, Normal thyroid  CHEST/LUNG: Mild inspiratory crackles  HEART: Regular rate and rhythm; No murmurs, rubs, or gallops  ABDOMEN: Distended and nontender  NERVOUS SYSTEM:  Alert & Oriented X3, Good concentration; Motor Strength 5/5 B/L   EXTREMITIES: Bilateral lower extremity edema; no calf pain tenderness  SKIN;    LABS:                        9.2    7.87  )-----------( 257      ( 23 Sep 2019 01:01 )             30.9     09-23    135  |  96  |  85<H>  ----------------------------<  74  5.8<H>   |  26  |  12.20<H>    Ca    9.4      23 Sep 2019 04:27    CAPILLARY BLOOD GLUCOSE    POCT Blood Glucose.: 126 mg/dL (24 Sep 2019 07:51)  POCT Blood Glucose.: 137 mg/dL (23 Sep 2019 14:32)    RADIOLOGY & ADDITIONAL TESTS:    CXR  Impression: Grossly stable pulmonary vascular congestion; underlying   pulmonary infiltrate/pneumonia cannot be excluded. This finding is   communicated with the emergency department via the PACS communication   system.    No evidence for pleural effusion or pneumothorax.    The trachea is midline.    Stable cardiac silhouette.    Stable left subclavian vascular stent.    ERICK BOWERS M.D., ATTENDING RADIOLOGIST  This document has been electronically signed. Sep 23 2019  8:35AM    Imaging Personally Reviewed:  [ ] YES  [ ] NO    Consultant(s) Notes Reviewed:  [ ] YES  [ ] NO
[facility] : Progress Note :: [phone], (office [phone]),   Dr La / Dr Hui / Dr Barber / Dr Villalobos / Dr Hang CASTELLANO / Dr Mehta / Dr Sanchez / Dr Alber dumont  _____________________________________________________________________________________________    seen on HD. SOB better.    aspirin (Short breath)  aspirin (Swelling)  penicillin (Short breath)  penicillins (Swelling)  shellfish (Unknown)    Hospital Medications:   MEDICATIONS  (STANDING):  acetaminophen  IVPB .. 1000 milliGRAM(s) IV Intermittent once  amLODIPine   Tablet 2.5 milliGRAM(s) Oral daily  ascorbic acid 500 milliGRAM(s) Oral daily  carvedilol 6.25 milliGRAM(s) Oral every 12 hours  chlorhexidine 2% Cloths 1 Application(s) Topical daily  clopidogrel Tablet 75 milliGRAM(s) Oral daily  epoetin cyndie Injectable 4000 Unit(s) IV Push <User Schedule>  ferrous    sulfate 325 milliGRAM(s) Oral daily  folic acid 1 milliGRAM(s) Oral daily  gabapentin 300 milliGRAM(s) Oral at bedtime  hydrALAZINE 50 milliGRAM(s) Oral three times a day  mirtazapine 7.5 milliGRAM(s) Oral at bedtime  pantoprazole    Tablet 40 milliGRAM(s) Oral before breakfast  PARoxetine 10 milliGRAM(s) Oral daily  risperiDONE   Tablet 1 milliGRAM(s) Oral at bedtime  sevelamer carbonate 800 milliGRAM(s) Oral three times a day with meals  simvastatin 40 milliGRAM(s) Oral at bedtime        VITALS:  T(F): 97.3 (09-25-19 @ 13:45), Max: 99.8 (09-25-19 @ 01:05)  HR: 90 (09-25-19 @ 13:45)  BP: 147/78 (09-25-19 @ 13:45)  RR: 18 (09-25-19 @ 13:45)  SpO2: 97% (09-25-19 @ 13:45)  Wt(kg): --    09-24 @ 07:01  -  09-25 @ 07:00  --------------------------------------------------------  IN: 0 mL / OUT: 500 mL / NET: -500 mL        PHYSICAL EXAM:  Constitutional: NAD  HEENT: anicteric sclera, oropharynx clear.  Neck: No JVD  Respiratory: CTAB, no wheezes, rales or rhonchi  Cardiovascular: S1, S2, RRR  Gastrointestinal: BS+, soft, NT/ND  Extremities:   peripheral edema++  Neurological: A/O x 3, no focal deficits.   Skin: No rashes  Vascular Access: AVF cannulated.    LABS:  09-25    134<L>  |  93<L>  |  75<H>  ----------------------------<  110<H>  5.0   |  28  |  11.80<H>    Ca    9.0      25 Sep 2019 06:23  Phos  8.5     09-25  Mg     2.5     09-25      Creatinine Trend: 11.80 <--, 10.30 <--, 12.20 <--, 12.30 <--, 9.83 <--, 8.90 <--                        9.4    8.10  )-----------( 283      ( 25 Sep 2019 06:23 )             32.0     Urine Studies:      RADIOLOGY & ADDITIONAL STUDIES:
normal

## 2019-09-25 NOTE — PROGRESS NOTE ADULT - ASSESSMENT
39 years old male from home with PMHx of ESRD on MWF HD (via LUE AVF), DM (on insulin), cocaine abuse, on home O2 (4L NC), morbid obesity, Bipolar disorder, schizophrenia, KRYSTIN-not on CPAP and HTN presents to the ED with chief complaint of shortness of breath x2 days.  1.Pulm HTN-add norvasc 2.5mg qd and needs tp get CPAP for KRYSTIN.  2.HTN-hx of cocaine use ,inc coreg 6.25mg bid,cont hydralazine.  3.ESRD-HD as per renal.  4.DM-Insulin.  5.Cont plavix and statin.  6.Obesity-weight loss.  7.Schizophrenia-cont psych medication.  8.KRYSTIN-CPAP titration as outpatient.  9.GI and DVT prophylaxis.

## 2019-09-25 NOTE — PROGRESS NOTE ADULT - ASSESSMENT
Patient is a 39y Male with ESRD on HD MWF. Was just discharged over the weekend. Brought to ED this AM by EMS after family called as he was short of breath. In ED found with pulmonary edema and hyperkalemia prompting a renal consult for emergent HD. He is currently seen on dialysis with improvement of SOB.    A/P:  #ESRD s/p UF   yesterday . HD today with 4 kilos UF goal.  #anemia of CKD cont procrit on HD   # HTN BP stable. Cont hydralazine, coreg  # renal osteodystrophy. cont sevelamer on HD   # hyperkalemia: non -compliant with dietary potassium restriction. HD today

## 2019-09-25 NOTE — DISCHARGE NOTE PROVIDER - CARE PROVIDER_API CALL
Tolu Andino)  Internal Medicine  [address] [facility] [address], [address]  [place] [address]  Phone: [phone]  Fax: [phone]  Follow Up Time: 1 week    Eliceo Thurston)  Critical Care Medicine; Internal Medicine; Pulmonary Disease  [address] [place] [address]  Phone: [phone]  Fax: [phone]  Follow Up Time: 1 week    Rachel Bob)  Internal Medicine  [address], [place] [address]  Phone: [phone]  Fax: [phone]  Follow Up Time: 1 week    Isac La)  Internal Medicine; Nephrology  [address] [place] [address]  Phone: [phone]  Fax: [phone]  Follow Up Time:

## 2019-09-25 NOTE — DISCHARGE NOTE NURSING/CASE MANAGEMENT/SOCIAL WORK - PATIENT PORTAL LINK FT
You can access the FollowMyHealth Patient Portal offered by [facility] by registering at the following website: [email or web address]. By joining [employer]’s FollowMyHealth portal, you will also be able to view your health information using other applications (apps) compatible with our system.

## 2019-09-25 NOTE — DISCHARGE NOTE PROVIDER - NSDCCPCAREPLAN_GEN_ALL_CORE_FT
PRINCIPAL DISCHARGE DIAGNOSIS  Diagnosis: ESRD (end stage renal disease) on dialysis  Assessment and Plan of Treatment: You were admitted to the inpatient medical floors with the chief complaint of dyspnea. This dyspnea was related to fluid overload and required emergent dialysis. You were dialyzed on 09/24 and 09/25 to remove the fluid from your lungs and this was done with great success. Additionally, as a result of this you had an increase in your potassium so we gave you medications to lower these levels to acceptable ranges with marked improvement. It is important in the future to make sure that you are getting your regularly scheduled HD sessions on MWF to ensure this doesn't happen in the future. Please ensure that you follow-up with your nephrologist, Dr. La, to ensure proper healthcare management.      SECONDARY DISCHARGE DIAGNOSES  Diagnosis: Pulmonary hypertension  Assessment and Plan of Treatment: On admission you were found to have suspected obstructive sleep apnea and have required CPAP in the past. Imaging of your heart and pulmonary hypertension were performed and confirmed that you had pulmonary hypertension with our Pulmonologist here, Dr. Thurston. It was recommmended that you follow-up with him or another pulmonologist outpatient to ensure you get your CPAP machine. Additionally, changes to your medication regiment were made to ensure better improvement in your pulmonary hypertension, including using Amlodipine at 2.5 mg daily. Please continue taking this medication in the future and follow-up with your PCP for further health management.    Diagnosis: HTN (hypertension)  Assessment and Plan of Treatment: Hypertension that you have could also have been a factor leading up to your admission of shortness of breath. A cardiologist, Dr. Bob evaluated you and adjusted your drug regimen to include Carvedilol 3.125 two times a day and Hydralazine 25 mg three times a day. Please continue taking this outpatient and follow up with your PCP outpatient.

## 2019-09-25 NOTE — DISCHARGE NOTE PROVIDER - CARE PROVIDERS DIRECT ADDRESSES
,DirectAddress_Unknown,[email or web address],DirectAddress_Unknown,DirectAddress_Unknown

## 2019-09-29 NOTE — CONSULT NOTE ADULT - NS NEC GEN PE MLT EXAM PC
No bruits; no thyromegaly or nodules Pre-application: Motor, sensory, and vascular responses intact in the injured extremity./The patient/caregiver verbalized understanding of how to care for the injured extremity with splint/Post-application: Motor, sensory, and vascular responses intact in the injured extremity.

## 2019-09-30 ENCOUNTER — FORM ENCOUNTER (OUTPATIENT)
Age: 39
End: 2019-09-30

## 2019-10-01 ENCOUNTER — APPOINTMENT (OUTPATIENT)
Dept: ENDOVASCULAR SURGERY | Facility: CLINIC | Age: 39
End: 2019-10-01
Payer: MEDICARE

## 2019-10-01 VITALS
TEMPERATURE: 98.7 F | SYSTOLIC BLOOD PRESSURE: 164 MMHG | HEIGHT: 78 IN | WEIGHT: 315 LBS | RESPIRATION RATE: 15 BRPM | HEART RATE: 98 BPM | OXYGEN SATURATION: 92 % | BODY MASS INDEX: 36.45 KG/M2 | DIASTOLIC BLOOD PRESSURE: 98 MMHG

## 2019-10-01 PROCEDURE — 36415 COLL VENOUS BLD VENIPUNCTURE: CPT

## 2019-10-01 PROCEDURE — 99285 EMERGENCY DEPT VISIT HI MDM: CPT | Mod: 25

## 2019-10-01 PROCEDURE — 80053 COMPREHEN METABOLIC PANEL: CPT

## 2019-10-01 PROCEDURE — 71045 X-RAY EXAM CHEST 1 VIEW: CPT

## 2019-10-01 PROCEDURE — 80048 BASIC METABOLIC PNL TOTAL CA: CPT

## 2019-10-01 PROCEDURE — 99261: CPT

## 2019-10-01 PROCEDURE — 74177 CT ABD & PELVIS W/CONTRAST: CPT

## 2019-10-01 PROCEDURE — 84443 ASSAY THYROID STIM HORMONE: CPT

## 2019-10-01 PROCEDURE — 84484 ASSAY OF TROPONIN QUANT: CPT

## 2019-10-01 PROCEDURE — 36902Z: CUSTOM

## 2019-10-01 PROCEDURE — 87340 HEPATITIS B SURFACE AG IA: CPT

## 2019-10-01 PROCEDURE — 93005 ELECTROCARDIOGRAM TRACING: CPT

## 2019-10-01 PROCEDURE — 85379 FIBRIN DEGRADATION QUANT: CPT

## 2019-10-01 PROCEDURE — 85610 PROTHROMBIN TIME: CPT

## 2019-10-01 PROCEDURE — 83735 ASSAY OF MAGNESIUM: CPT

## 2019-10-01 PROCEDURE — 93306 TTE W/DOPPLER COMPLETE: CPT

## 2019-10-01 PROCEDURE — 85027 COMPLETE CBC AUTOMATED: CPT

## 2019-10-01 PROCEDURE — 83880 ASSAY OF NATRIURETIC PEPTIDE: CPT

## 2019-10-01 PROCEDURE — 96375 TX/PRO/DX INJ NEW DRUG ADDON: CPT

## 2019-10-01 PROCEDURE — 84100 ASSAY OF PHOSPHORUS: CPT

## 2019-10-01 PROCEDURE — 85730 THROMBOPLASTIN TIME PARTIAL: CPT

## 2019-10-01 PROCEDURE — 86803 HEPATITIS C AB TEST: CPT

## 2019-10-01 PROCEDURE — 96374 THER/PROPH/DIAG INJ IV PUSH: CPT

## 2019-10-01 PROCEDURE — 96374 THER/PROPH/DIAG INJ IV PUSH: CPT | Mod: XU

## 2019-10-01 PROCEDURE — 93970 EXTREMITY STUDY: CPT

## 2019-10-01 PROCEDURE — 80307 DRUG TEST PRSMV CHEM ANLYZR: CPT

## 2019-10-01 PROCEDURE — 86706 HEP B SURFACE ANTIBODY: CPT

## 2019-10-01 PROCEDURE — 82962 GLUCOSE BLOOD TEST: CPT

## 2019-10-01 PROCEDURE — 83690 ASSAY OF LIPASE: CPT

## 2019-10-01 RX ORDER — GABAPENTIN 400 MG/1
400 CAPSULE ORAL
Refills: 0 | Status: DISCONTINUED | COMMUNITY
End: 2019-10-01

## 2019-10-01 NOTE — REASON FOR VISIT
[Other ___] : a [unfilled] visit for [Difficult Cannulation] : difficult cannulation [Inadequate Flow within AVF] : inadequate flow within AVF [Other: ___] : [unfilled] [FreeTextEntry2] : pain in access

## 2019-10-01 NOTE — HISTORY OF PRESENT ILLNESS
[] : left brachiocephalic fistula [FreeTextEntry4] : yesterday [FreeTextEntry1] : 2016  in [place] [FreeTextEntry6] : Dr. Jimenez [FreeTextEntry5] : yesterday 10pm

## 2019-10-07 ENCOUNTER — INPATIENT (INPATIENT)
Facility: HOSPITAL | Age: 39
LOS: 0 days | Discharge: HOME | DRG: 602 | End: 2019-10-08
Attending: INTERNAL MEDICINE | Admitting: INTERNAL MEDICINE
Payer: COMMERCIAL

## 2019-10-07 VITALS
WEIGHT: 315 LBS | HEIGHT: 78 IN | OXYGEN SATURATION: 97 % | SYSTOLIC BLOOD PRESSURE: 150 MMHG | TEMPERATURE: 99 F | DIASTOLIC BLOOD PRESSURE: 80 MMHG | HEART RATE: 86 BPM | RESPIRATION RATE: 17 BRPM

## 2019-10-07 DIAGNOSIS — L03.90 CELLULITIS, UNSPECIFIED: ICD-10-CM

## 2019-10-07 DIAGNOSIS — Z29.9 ENCOUNTER FOR PROPHYLACTIC MEASURES, UNSPECIFIED: ICD-10-CM

## 2019-10-07 DIAGNOSIS — E66.01 MORBID (SEVERE) OBESITY DUE TO EXCESS CALORIES: ICD-10-CM

## 2019-10-07 DIAGNOSIS — E11.9 TYPE 2 DIABETES MELLITUS WITHOUT COMPLICATIONS: ICD-10-CM

## 2019-10-07 DIAGNOSIS — F31.9 BIPOLAR DISORDER, UNSPECIFIED: ICD-10-CM

## 2019-10-07 DIAGNOSIS — Z98.890 OTHER SPECIFIED POSTPROCEDURAL STATES: Chronic | ICD-10-CM

## 2019-10-07 DIAGNOSIS — N18.6 END STAGE RENAL DISEASE: ICD-10-CM

## 2019-10-07 DIAGNOSIS — I10 ESSENTIAL (PRIMARY) HYPERTENSION: ICD-10-CM

## 2019-10-07 DIAGNOSIS — J44.9 CHRONIC OBSTRUCTIVE PULMONARY DISEASE, UNSPECIFIED: ICD-10-CM

## 2019-10-07 LAB
ACETONE SERPL-MCNC: NEGATIVE — SIGNIFICANT CHANGE UP
ALBUMIN SERPL ELPH-MCNC: 2.9 G/DL — LOW (ref 3.5–5)
ALP SERPL-CCNC: 249 U/L — HIGH (ref 40–120)
ALT FLD-CCNC: 22 U/L DA — SIGNIFICANT CHANGE UP (ref 10–60)
ANION GAP SERPL CALC-SCNC: 6 MMOL/L — SIGNIFICANT CHANGE UP (ref 5–17)
ANISOCYTOSIS BLD QL: SLIGHT — SIGNIFICANT CHANGE UP
APPEARANCE UR: CLEAR — SIGNIFICANT CHANGE UP
APTT BLD: 34.8 SEC — SIGNIFICANT CHANGE UP (ref 27.5–36.3)
AST SERPL-CCNC: 21 U/L — SIGNIFICANT CHANGE UP (ref 10–40)
BACTERIA # UR AUTO: ABNORMAL /HPF
BASOPHILS # BLD AUTO: 0.07 K/UL — SIGNIFICANT CHANGE UP (ref 0–0.2)
BASOPHILS NFR BLD AUTO: 1 % — SIGNIFICANT CHANGE UP (ref 0–2)
BILIRUB SERPL-MCNC: 0.7 MG/DL — SIGNIFICANT CHANGE UP (ref 0.2–1.2)
BILIRUB UR-MCNC: NEGATIVE — SIGNIFICANT CHANGE UP
BUN SERPL-MCNC: 26 MG/DL — HIGH (ref 7–18)
CALCIUM SERPL-MCNC: 9 MG/DL — SIGNIFICANT CHANGE UP (ref 8.4–10.5)
CHLORIDE SERPL-SCNC: 92 MMOL/L — LOW (ref 96–108)
CO2 SERPL-SCNC: 37 MMOL/L — HIGH (ref 22–31)
COLOR SPEC: YELLOW — SIGNIFICANT CHANGE UP
CREAT SERPL-MCNC: 4.79 MG/DL — HIGH (ref 0.5–1.3)
DIFF PNL FLD: NEGATIVE — SIGNIFICANT CHANGE UP
EOSINOPHIL # BLD AUTO: 0.27 K/UL — SIGNIFICANT CHANGE UP (ref 0–0.5)
EOSINOPHIL NFR BLD AUTO: 4 % — SIGNIFICANT CHANGE UP (ref 0–6)
EPI CELLS # UR: ABNORMAL /HPF
GLUCOSE BLDC GLUCOMTR-MCNC: 127 MG/DL — HIGH (ref 70–99)
GLUCOSE SERPL-MCNC: 85 MG/DL — SIGNIFICANT CHANGE UP (ref 70–99)
GLUCOSE UR QL: 100 MG/DL
GRAN CASTS # UR COMP ASSIST: ABNORMAL /LPF
HCT VFR BLD CALC: 30.3 % — LOW (ref 39–50)
HGB BLD-MCNC: 8.9 G/DL — LOW (ref 13–17)
HYALINE CASTS # UR AUTO: ABNORMAL /LPF
INR BLD: 1.17 RATIO — HIGH (ref 0.88–1.16)
KETONES UR-MCNC: NEGATIVE — SIGNIFICANT CHANGE UP
LACTATE SERPL-SCNC: 1.2 MMOL/L — SIGNIFICANT CHANGE UP (ref 0.7–2)
LEUKOCYTE ESTERASE UR-ACNC: ABNORMAL
LYMPHOCYTES # BLD AUTO: 1.06 K/UL — SIGNIFICANT CHANGE UP (ref 1–3.3)
LYMPHOCYTES # BLD AUTO: 16 % — SIGNIFICANT CHANGE UP (ref 13–44)
MAGNESIUM SERPL-MCNC: 2.1 MG/DL — SIGNIFICANT CHANGE UP (ref 1.6–2.6)
MANUAL SMEAR VERIFICATION: SIGNIFICANT CHANGE UP
MCHC RBC-ENTMCNC: 26.6 PG — LOW (ref 27–34)
MCHC RBC-ENTMCNC: 29.4 GM/DL — LOW (ref 32–36)
MCV RBC AUTO: 90.7 FL — SIGNIFICANT CHANGE UP (ref 80–100)
MONOCYTES # BLD AUTO: 0.73 K/UL — SIGNIFICANT CHANGE UP (ref 0–0.9)
MONOCYTES NFR BLD AUTO: 11 % — SIGNIFICANT CHANGE UP (ref 2–14)
NEUTROPHILS # BLD AUTO: 4.44 K/UL — SIGNIFICANT CHANGE UP (ref 1.8–7.4)
NEUTROPHILS NFR BLD AUTO: 67 % — SIGNIFICANT CHANGE UP (ref 43–77)
NITRITE UR-MCNC: NEGATIVE — SIGNIFICANT CHANGE UP
NRBC # BLD: 0 /100 — SIGNIFICANT CHANGE UP (ref 0–0)
NT-PROBNP SERPL-SCNC: HIGH PG/ML (ref 0–125)
PH UR: 8 — SIGNIFICANT CHANGE UP (ref 5–8)
PLAT MORPH BLD: NORMAL — SIGNIFICANT CHANGE UP
PLATELET # BLD AUTO: 299 K/UL — SIGNIFICANT CHANGE UP (ref 150–400)
PLATELET COUNT - ESTIMATE: NORMAL — SIGNIFICANT CHANGE UP
POIKILOCYTOSIS BLD QL AUTO: SLIGHT — SIGNIFICANT CHANGE UP
POTASSIUM SERPL-MCNC: 4 MMOL/L — SIGNIFICANT CHANGE UP (ref 3.5–5.3)
POTASSIUM SERPL-SCNC: 4 MMOL/L — SIGNIFICANT CHANGE UP (ref 3.5–5.3)
PROT SERPL-MCNC: 7.8 G/DL — SIGNIFICANT CHANGE UP (ref 6–8.3)
PROT UR-MCNC: 500 MG/DL
PROTHROM AB SERPL-ACNC: 13.1 SEC — HIGH (ref 10–12.9)
RBC # BLD: 3.34 M/UL — LOW (ref 4.2–5.8)
RBC # FLD: 18 % — HIGH (ref 10.3–14.5)
RBC BLD AUTO: ABNORMAL
RBC CASTS # UR COMP ASSIST: SIGNIFICANT CHANGE UP /HPF (ref 0–2)
SODIUM SERPL-SCNC: 135 MMOL/L — SIGNIFICANT CHANGE UP (ref 135–145)
SP GR SPEC: 1.01 — SIGNIFICANT CHANGE UP (ref 1.01–1.02)
UROBILINOGEN FLD QL: NEGATIVE — SIGNIFICANT CHANGE UP
VARIANT LYMPHS # BLD: 1 % — SIGNIFICANT CHANGE UP (ref 0–6)
WBC # BLD: 6.63 K/UL — SIGNIFICANT CHANGE UP (ref 3.8–10.5)
WBC # FLD AUTO: 6.63 K/UL — SIGNIFICANT CHANGE UP (ref 3.8–10.5)
WBC UR QL: ABNORMAL /HPF (ref 0–5)

## 2019-10-07 PROCEDURE — 99223 1ST HOSP IP/OBS HIGH 75: CPT | Mod: AI,GC

## 2019-10-07 PROCEDURE — 99285 EMERGENCY DEPT VISIT HI MDM: CPT

## 2019-10-07 PROCEDURE — 71045 X-RAY EXAM CHEST 1 VIEW: CPT | Mod: 26

## 2019-10-07 PROCEDURE — 93971 EXTREMITY STUDY: CPT | Mod: 26,LT

## 2019-10-07 RX ORDER — ASCORBIC ACID 60 MG
500 TABLET,CHEWABLE ORAL DAILY
Refills: 0 | Status: DISCONTINUED | OUTPATIENT
Start: 2019-10-07 | End: 2019-10-08

## 2019-10-07 RX ORDER — METHOCARBAMOL 500 MG/1
500 TABLET, FILM COATED ORAL EVERY 12 HOURS
Refills: 0 | Status: DISCONTINUED | OUTPATIENT
Start: 2019-10-07 | End: 2019-10-08

## 2019-10-07 RX ORDER — SEVELAMER CARBONATE 2400 MG/1
800 POWDER, FOR SUSPENSION ORAL
Refills: 0 | Status: DISCONTINUED | OUTPATIENT
Start: 2019-10-07 | End: 2019-10-08

## 2019-10-07 RX ORDER — VANCOMYCIN HCL 1 G
1000 VIAL (EA) INTRAVENOUS ONCE
Refills: 0 | Status: COMPLETED | OUTPATIENT
Start: 2019-10-07 | End: 2019-10-07

## 2019-10-07 RX ORDER — CARVEDILOL PHOSPHATE 80 MG/1
6.25 CAPSULE, EXTENDED RELEASE ORAL EVERY 12 HOURS
Refills: 0 | Status: DISCONTINUED | OUTPATIENT
Start: 2019-10-07 | End: 2019-10-08

## 2019-10-07 RX ORDER — HYDROMORPHONE HYDROCHLORIDE 2 MG/ML
0.5 INJECTION INTRAMUSCULAR; INTRAVENOUS; SUBCUTANEOUS ONCE
Refills: 0 | Status: DISCONTINUED | OUTPATIENT
Start: 2019-10-07 | End: 2019-10-07

## 2019-10-07 RX ORDER — HYDRALAZINE HCL 50 MG
50 TABLET ORAL THREE TIMES A DAY
Refills: 0 | Status: DISCONTINUED | OUTPATIENT
Start: 2019-10-07 | End: 2019-10-08

## 2019-10-07 RX ORDER — FUROSEMIDE 40 MG
80 TABLET ORAL DAILY
Refills: 0 | Status: DISCONTINUED | OUTPATIENT
Start: 2019-10-07 | End: 2019-10-08

## 2019-10-07 RX ORDER — AMLODIPINE BESYLATE 2.5 MG/1
2.5 TABLET ORAL DAILY
Refills: 0 | Status: DISCONTINUED | OUTPATIENT
Start: 2019-10-07 | End: 2019-10-08

## 2019-10-07 RX ORDER — FOLIC ACID 0.8 MG
1 TABLET ORAL DAILY
Refills: 0 | Status: DISCONTINUED | OUTPATIENT
Start: 2019-10-07 | End: 2019-10-08

## 2019-10-07 RX ORDER — TRAMADOL HYDROCHLORIDE 50 MG/1
25 TABLET ORAL
Refills: 0 | Status: DISCONTINUED | OUTPATIENT
Start: 2019-10-07 | End: 2019-10-07

## 2019-10-07 RX ORDER — INSULIN LISPRO 100/ML
12 VIAL (ML) SUBCUTANEOUS
Refills: 0 | Status: DISCONTINUED | OUTPATIENT
Start: 2019-10-07 | End: 2019-10-08

## 2019-10-07 RX ORDER — ACETAMINOPHEN 500 MG
1000 TABLET ORAL ONCE
Refills: 0 | Status: COMPLETED | OUTPATIENT
Start: 2019-10-07 | End: 2019-10-07

## 2019-10-07 RX ORDER — PANTOPRAZOLE SODIUM 20 MG/1
40 TABLET, DELAYED RELEASE ORAL
Refills: 0 | Status: DISCONTINUED | OUTPATIENT
Start: 2019-10-07 | End: 2019-10-08

## 2019-10-07 RX ORDER — AMLODIPINE BESYLATE 2.5 MG/1
2.5 TABLET ORAL DAILY
Refills: 0 | Status: DISCONTINUED | OUTPATIENT
Start: 2019-10-07 | End: 2019-10-07

## 2019-10-07 RX ORDER — ONDANSETRON 8 MG/1
4 TABLET, FILM COATED ORAL ONCE
Refills: 0 | Status: COMPLETED | OUTPATIENT
Start: 2019-10-07 | End: 2019-10-07

## 2019-10-07 RX ORDER — SIMVASTATIN 20 MG/1
40 TABLET, FILM COATED ORAL AT BEDTIME
Refills: 0 | Status: DISCONTINUED | OUTPATIENT
Start: 2019-10-07 | End: 2019-10-08

## 2019-10-07 RX ORDER — CEFTRIAXONE 500 MG/1
1000 INJECTION, POWDER, FOR SOLUTION INTRAMUSCULAR; INTRAVENOUS EVERY 24 HOURS
Refills: 0 | Status: DISCONTINUED | OUTPATIENT
Start: 2019-10-07 | End: 2019-10-08

## 2019-10-07 RX ORDER — CLOPIDOGREL BISULFATE 75 MG/1
75 TABLET, FILM COATED ORAL DAILY
Refills: 0 | Status: DISCONTINUED | OUTPATIENT
Start: 2019-10-07 | End: 2019-10-08

## 2019-10-07 RX ORDER — INSULIN LISPRO 100/ML
VIAL (ML) SUBCUTANEOUS
Refills: 0 | Status: DISCONTINUED | OUTPATIENT
Start: 2019-10-07 | End: 2019-10-08

## 2019-10-07 RX ORDER — FUROSEMIDE 40 MG
80 TABLET ORAL DAILY
Refills: 0 | Status: DISCONTINUED | OUTPATIENT
Start: 2019-10-07 | End: 2019-10-07

## 2019-10-07 RX ORDER — CARVEDILOL PHOSPHATE 80 MG/1
6.25 CAPSULE, EXTENDED RELEASE ORAL EVERY 12 HOURS
Refills: 0 | Status: DISCONTINUED | OUTPATIENT
Start: 2019-10-07 | End: 2019-10-07

## 2019-10-07 RX ORDER — TRAMADOL HYDROCHLORIDE 50 MG/1
50 TABLET ORAL EVERY 6 HOURS
Refills: 0 | Status: DISCONTINUED | OUTPATIENT
Start: 2019-10-07 | End: 2019-10-08

## 2019-10-07 RX ORDER — HYDRALAZINE HCL 50 MG
50 TABLET ORAL THREE TIMES A DAY
Refills: 0 | Status: DISCONTINUED | OUTPATIENT
Start: 2019-10-07 | End: 2019-10-07

## 2019-10-07 RX ORDER — GABAPENTIN 400 MG/1
300 CAPSULE ORAL AT BEDTIME
Refills: 0 | Status: DISCONTINUED | OUTPATIENT
Start: 2019-10-07 | End: 2019-10-08

## 2019-10-07 RX ORDER — FERROUS SULFATE 325(65) MG
325 TABLET ORAL DAILY
Refills: 0 | Status: DISCONTINUED | OUTPATIENT
Start: 2019-10-07 | End: 2019-10-08

## 2019-10-07 RX ORDER — RISPERIDONE 4 MG/1
1 TABLET ORAL AT BEDTIME
Refills: 0 | Status: DISCONTINUED | OUTPATIENT
Start: 2019-10-07 | End: 2019-10-08

## 2019-10-07 RX ORDER — HEPARIN SODIUM 5000 [USP'U]/ML
5000 INJECTION INTRAVENOUS; SUBCUTANEOUS EVERY 12 HOURS
Refills: 0 | Status: DISCONTINUED | OUTPATIENT
Start: 2019-10-07 | End: 2019-10-08

## 2019-10-07 RX ORDER — ALBUTEROL 90 UG/1
2 AEROSOL, METERED ORAL EVERY 6 HOURS
Refills: 0 | Status: DISCONTINUED | OUTPATIENT
Start: 2019-10-07 | End: 2019-10-08

## 2019-10-07 RX ADMIN — Medication 1000 MILLIGRAM(S): at 16:20

## 2019-10-07 RX ADMIN — CEFTRIAXONE 100 MILLIGRAM(S): 500 INJECTION, POWDER, FOR SOLUTION INTRAMUSCULAR; INTRAVENOUS at 23:21

## 2019-10-07 RX ADMIN — HYDROMORPHONE HYDROCHLORIDE 0.5 MILLIGRAM(S): 2 INJECTION INTRAMUSCULAR; INTRAVENOUS; SUBCUTANEOUS at 14:42

## 2019-10-07 RX ADMIN — Medication 250 MILLIGRAM(S): at 14:54

## 2019-10-07 RX ADMIN — HYDROMORPHONE HYDROCHLORIDE 0.5 MILLIGRAM(S): 2 INJECTION INTRAMUSCULAR; INTRAVENOUS; SUBCUTANEOUS at 23:49

## 2019-10-07 RX ADMIN — Medication 1000 MILLIGRAM(S): at 14:42

## 2019-10-07 RX ADMIN — HYDROMORPHONE HYDROCHLORIDE 0.5 MILLIGRAM(S): 2 INJECTION INTRAMUSCULAR; INTRAVENOUS; SUBCUTANEOUS at 19:07

## 2019-10-07 RX ADMIN — HYDROMORPHONE HYDROCHLORIDE 0.5 MILLIGRAM(S): 2 INJECTION INTRAMUSCULAR; INTRAVENOUS; SUBCUTANEOUS at 18:01

## 2019-10-07 RX ADMIN — HYDROMORPHONE HYDROCHLORIDE 0.5 MILLIGRAM(S): 2 INJECTION INTRAMUSCULAR; INTRAVENOUS; SUBCUTANEOUS at 14:06

## 2019-10-07 RX ADMIN — HYDROMORPHONE HYDROCHLORIDE 0.5 MILLIGRAM(S): 2 INJECTION INTRAMUSCULAR; INTRAVENOUS; SUBCUTANEOUS at 23:19

## 2019-10-07 RX ADMIN — RISPERIDONE 1 MILLIGRAM(S): 4 TABLET ORAL at 23:22

## 2019-10-07 RX ADMIN — Medication 400 MILLIGRAM(S): at 13:04

## 2019-10-07 RX ADMIN — SIMVASTATIN 40 MILLIGRAM(S): 20 TABLET, FILM COATED ORAL at 23:22

## 2019-10-07 RX ADMIN — GABAPENTIN 300 MILLIGRAM(S): 400 CAPSULE ORAL at 23:22

## 2019-10-07 RX ADMIN — Medication 50 MILLIGRAM(S): at 23:23

## 2019-10-07 NOTE — ED PROVIDER NOTE - MUSCULOSKELETAL, MLM
Spine appears normal, range of motion is not limited, LLE- post aspect thigh/calf tenderness to palp., erythematous, LUE-AVG with thrills, Rt 3rd distal toe-no discharge, erythema

## 2019-10-07 NOTE — H&P ADULT - NSHPSOCIALHISTORY_GEN_ALL_CORE
Pt smoked in the past, quit 3 months ago   no alcohol consumption  Used to take cocaine in the past but now free.

## 2019-10-07 NOTE — H&P ADULT - PROBLEM SELECTOR PLAN 2
Pt at home on novolog 15 units TID  Last Hba1c 5.5  Continue with sliding scale (blood sugar running low and adjust accordingly)

## 2019-10-07 NOTE — H&P ADULT - PROBLEM SELECTOR PLAN 3
Pt at home on novolog 15 units TID  Last Hba1c 5.5  Continue with sliding scale (blood sugar running low and adjust accordingly) Pt on amlodipine, hydralazine, metoprolol and lasix at home  c/w metoprolol and lasix with parameters  add home meds acc to BP. Pt on amlodipine, hydralazine, metoprolol and lasix at home  c/w amlodipine, hydralazine, metoprolol and lasix with parameters  monitor BP and adjust accordingly - ESRD Maintenance HD (M/W/F )   - last full dialysis today   - Cr: 4.79 on admission  - B/L pedal edema  - no crackles  or fluid overload  - Avoid Nephrotoxic Meds/ Agents such as (NSAIDs, IV contrast, Aminoglycosides such as gentamicin, Gadolinium contrast, Phosphate containing enemas, etc..)  - Adjust Medications according to eGFR  - Renal diet   - f/u CM (pt will need reinstatement of dialysis upon discharge)  ** Nephrology consulted Dr. La

## 2019-10-07 NOTE — ED ADULT NURSE NOTE - NSIMPLEMENTINTERV_GEN_ALL_ED
Implemented All Universal Safety Interventions:  [place] to call system. Call bell, personal items and telephone within reach. Instruct patient to call for assistance. Room bathroom lighting operational. Non-slip footwear when patient is off stretcher. Physically safe environment: no spills, clutter or unnecessary equipment. Stretcher in lowest position, wheels locked, appropriate side rails in place.

## 2019-10-07 NOTE — H&P ADULT - HISTORY OF PRESENT ILLNESS
39 years old male from home with PMHx of ESRD on MWF HD (via LUE AVF), DM (on insulin), cocaine abuse, COPD on home O2 (4L NC), morbid obesity, Bipolar disorder, schizophrenia and HTN presents to the ED with c/o L leg pain and swelling. As per patient he completed HD today and soon started developing the L leg pain. he describes pain as Burning pain that is constant and getting worse.. Patient is also complaining of abdominal pain in umbilical region. Which is non-radiating and intermittent in nature.  He was recentlty admitted 4 week ago for SOB in [facility].  Pt denies any chest pain, palpitations, nausea, SOB vomiting, change in bowel or urinary habits.  patient is admitted with fluid overload needing urgent HD.     In the ED  Pt was given Levaquin and Vanco 1 dose   US doppler - no significant findings  Given  Hydromorphone 39 years old male from home with PMHx of ESRD on MWF HD (via LUE AVF), DM (on insulin), cocaine abuse, COPD on home O2 (4L NC), morbid obesity, Bipolar disorder, schizophrenia and HTN presents to the ED with c/o L leg pain and swelling. As per patient he completed HD today and soon started developing the L leg pain. he describes pain as Burning pain that is constant and getting worse.. Patient is also complaining of abdominal pain in umbilical region. Which is non-radiating and intermittent in nature.  He was recentlty admitted 4 week ago for SOB in [facility].  Pt denies any chest pain, palpitations, nausea, SOB vomiting, change in bowel or urinary habits.  .     In the ED  Pt was given Levaquin and Vanco 1 dose   US doppler - no significant findings  Given  Hydromorphone

## 2019-10-07 NOTE — H&P ADULT - NSHPLABSRESULTS_GEN_ALL_CORE
8.9    6.63  )-----------( 299      ( 07 Oct 2019 13:32 )             30.3       10-07    135  |  92<L>  |  26<H>  ----------------------------<  85  4.0   |  37<H>  |  4.79<H>    Ca    9.0      07 Oct 2019 13:33  Mg     2.1     10-07    TPro  7.8  /  Alb  2.9<L>  /  TBili  0.7  /  DBili  x   /  AST  21  /  ALT  22  /  AlkPhos  249<H>  10-07              Urinalysis Basic - ( 07 Oct 2019 18:04 )    Color: Yellow / Appearance: Clear / S.010 / pH: x  Gluc: x / Ketone: Negative  / Bili: Negative / Urobili: Negative   Blood: x / Protein: 500 mg/dL / Nitrite: Negative   Leuk Esterase: Trace / RBC: x / WBC x   Sq Epi: x / Non Sq Epi: x / Bacteria: x        PT/INR - ( 07 Oct 2019 13:33 )   PT: 13.1 sec;   INR: 1.17 ratio         PTT - ( 07 Oct 2019 13:33 )  PTT:34.8 sec    Lactate Trend  10-07 @ 13:32 Lactate:1.2             CAPILLARY BLOOD GLUCOSE

## 2019-10-07 NOTE — H&P ADULT - ATTENDING COMMENTS
Patient was examined in the ED and discussed with Dr. Rasmussen.     He was referred by Nephrologist from dialysis unit because of erythema and pain in the left thigh and fever. Fever was not found on examination in the ED.   He has had a previous admission for scrotal cellulitis and also an admission for fluid overload.     PMH: DM, ESRD, KRYSTIN, bipolar, cocaine use (now not using), Previous pain management consultation advised against parenteral opiates for management of his symptoms.     Alert, cooperative, obese man in NAD  Vital Signs Last 24 Hrs  T(C): 36.7 (08 Oct 2019 00:31), Max: 37.3 (07 Oct 2019 23:44)  T(F): 98.1 (08 Oct 2019 00:31), Max: 99.1 (07 Oct 2019 23:44)  HR: 87 (08 Oct 2019 00:31) (82 - 87)  BP: 133/76 (08 Oct 2019 00:31) (112/67 - 160/83)  BP(mean): --  RR: 16 (08 Oct 2019 00:31) (16 - 18)  SpO2: 100% (08 Oct 2019 00:31) (97% - 100%)  Lungs, clear  Cor, RRR  Abdomen, soft  Ext, skin of left thigh has minimal erythema and calor                            8.9    6.63  )-----------( 299      ( 07 Oct 2019 13:32 )             30.3       10    135  |  92<L>  |  26<H>  ----------------------------<  85  4.0   |  37<H>  |  4.79<H>    Ca    9.0      07 Oct 2019 13:33  Mg     2.1     10-    TPro  7.8  /  Alb  2.9<L>  /  TBili  0.7  /  DBili  x   /  AST  21  /  ALT  22  /  AlkPhos  249<H>  10        Urinalysis Basic - ( 07 Oct 2019 18:04 )    Color: Yellow / Appearance: Clear / S.010 / pH: x  Gluc: x / Ketone: Negative  / Bili: Negative / Urobili: Negative   Blood: x / Protein: 500 mg/dL / Nitrite: Negative   Leuk Esterase: Trace / RBC: 0-2 /HPF / WBC 11-25 /HPF   Sq Epi: x / Non Sq Epi: Moderate /HPF / Bacteria: Trace /HP  PT/INR - ( 07 Oct 2019 13:33 )   PT: 13.1 sec;   INR: 1.17 ratio         PTT - ( 07 Oct 2019 13:33 )  PTT:34.8 sec  Lactate Trend  10-07 @ 13:32 Lactate:1.2   CAPILLARY BLOOD GLUCOSE  POCT Blood Glucose.: 127 mg/dL (07 Oct 2019 23:24)    IMP:  Pain in left thigh, possibly secondary to recurrent cellulitis, though objective findings of erythema, calor, and fever are minimal.   ESRD, on dialysis  Fluid overload, dramatically improved since previous admission  DM, moderate control, complicated by ESRD  KRYSTIN, on nasal oxygen  Plan:  Ceftriaxone, as tolerated in previous admission           clinical f/u           Nephrology consultation, Dr. La           Control of pain as recommended by Pain Management in the previous admission:            Gabapentin, flexeril, tramadol.            NO NSAIDS OR PARENTERAL OPIOIDS.

## 2019-10-07 NOTE — H&P ADULT - PROBLEM SELECTOR PLAN 6
IMPROVE VTE Individual Risk Assessment    RISK                                                                Points  [  ] Previous VTE                                                  3  [  ] Thrombophilia                                               2  [  ] Lower limb paralysis                                      2        (unable to hold up >15 seconds)    [  ] Current Cancer                                              2         (within 6 months)  [ x ] Immobilization > 24 hrs                                1  [  ] ICU/CCU stay > 24 hours                              1  [  ] Age > 60                                                      1  IMPROVE VTE Score ___1___  No indication for chemoprophylaxis  Re-eval in 24 hours. IMPROVE VTE Individual Risk Assessment    RISK                                                                Points  [  ] Previous VTE                                                  3  [  ] Thrombophilia                                               2  [  ] Lower limb paralysis                                      2        (unable to hold up >15 seconds)    [  ] Current Cancer                                              2         (within 6 months)  [ x ] Immobilization > 24 hrs                                1  [  ] ICU/CCU stay > 24 hours                              1  [  ] Age > 60                                                      1  IMPROVE VTE Score ___1___  heparin SC q12 renally dosed  Re-eval in 24 hours. c/w home medications.

## 2019-10-07 NOTE — H&P ADULT - PROBLEM SELECTOR PLAN 7
IMPROVE VTE Individual Risk Assessment    RISK                                                                Points  [  ] Previous VTE                                                  3  [  ] Thrombophilia                                               2  [  ] Lower limb paralysis                                      2        (unable to hold up >15 seconds)    [  ] Current Cancer                                              2         (within 6 months)  [ x ] Immobilization > 24 hrs                                1  [  ] ICU/CCU stay > 24 hours                              1  [  ] Age > 60                                                      1  IMPROVE VTE Score ___1___  heparin SC q12 renally dosed  Re-eval in 24 hours.

## 2019-10-07 NOTE — ED ADULT NURSE NOTE - OBJECTIVE STATEMENT
During HD eval by nephrologist who referred pt to ED for left thigh redness and burning sensation, HD MWF, Edema noted to lower extremity

## 2019-10-07 NOTE — ED PROVIDER NOTE - PMH
Bipolar disorder    DM (diabetes mellitus)    ESRD on dialysis    HTN (hypertension)    Migraine    Morbid obesity with BMI of 40.0-44.9, adult    Schizophrenia Bipolar disorder    COPD (chronic obstructive pulmonary disease)    DM (diabetes mellitus)    ESRD on dialysis    HTN (hypertension)    Migraine    Morbid obesity with BMI of 40.0-44.9, adult    Schizophrenia

## 2019-10-07 NOTE — H&P ADULT - NSHPPHYSICALEXAM_GEN_ALL_CORE
Vital Signs Last 24 Hrs  T(C): 36.8 (07 Oct 2019 19:34), Max: 37.2 (07 Oct 2019 11:24)  T(F): 98.3 (07 Oct 2019 19:34), Max: 99 (07 Oct 2019 11:24)  HR: 84 (07 Oct 2019 19:34) (82 - 86)  BP: 156/88 (07 Oct 2019 19:34) (112/67 - 156/88)  BP(mean): --  RR: 18 (07 Oct 2019 19:34) (17 - 18)  SpO2: 100% (07 Oct 2019 19:34) (97% - 100%)

## 2019-10-07 NOTE — H&P ADULT - PROBLEM SELECTOR PLAN 4
- ESRD Maintenance HD (M/W/F )   - last full dialysis today   - Cr: 4.79 on admission  - B/L pedal edema  - no crackles  or fluid overload  - Avoid Nephrotoxic Meds/ Agents such as (NSAIDs, IV contrast, Aminoglycosides such as gentamicin, Gadolinium contrast, Phosphate containing enemas, etc..)  - Adjust Medications according to eGFR  - Renal diet   - f/u CM (pt will need reinstatement of dialysis upon discharge)  ** Nephrology consulted Dr. La Pt at home on novolog 15 units TID  Last Hba1c 5.5  Continue with sliding scale (blood sugar running low and adjust accordingly)

## 2019-10-07 NOTE — H&P ADULT - NSICDXPASTMEDICALHX_GEN_ALL_CORE_FT
PAST MEDICAL HISTORY:  Bipolar disorder     COPD (chronic obstructive pulmonary disease)     DM (diabetes mellitus)     ESRD on dialysis     HTN (hypertension)     Migraine     Morbid obesity with BMI of 40.0-44.9, adult     Schizophrenia

## 2019-10-07 NOTE — ED PROVIDER NOTE - OBJECTIVE STATEMENT
39yp\oM h/o IDDM (last dose yesterday 10/7), ESRD on dialysis MWF (last one this AM), p/w burning thigh pain in LLE for 1 day. 9.5/10, constant, nonradiating; Friday had R middle toenail removed by podiatrist, was given abx (Bactrim) and has been taking them since Friday. Endorses fever (100-101), chills, nausea, Denies vomiting, recent trauma 39yp\oM h/o IDDM (last dose yesterday 10/7), ESRD on dialysis MWF (last one this AM), on Home O2, c/o burning thigh pain in LLE for 1 day. 9.5/10, constant, nonradiating; Friday had R middle toenail removed by podiatrist, was given abx (Bactrim) and has been taking them since Friday. Endorses fever (100-101), chills, nausea, Denies vomiting, recent trauma

## 2019-10-07 NOTE — H&P ADULT - ASSESSMENT
39 years old male from home with PMHx of ESRD on MWF HD (via LUE AVF), DM (on insulin), cocaine abuse, COPD on home O2 (4L NC), morbid obesity, Bipolar disorder, schizophrenia and HTN presents to the ED with c/o L leg pain and swelling. As per patient he completed HD today and soon started developing the L leg pain. he describes pain as Burning pain that is constant and getting worse.. Patient is also complaining of abdominal pain in umbilical region. Which is non-radiating and intermittent in nature.  He was recentlty admitted 4 week ago for SOB in [facility].  Pt denies any chest pain, palpitations, nausea, SOB vomiting, change in bowel or urinary habits.  patient is admitted with fluid overload needing urgent HD.     In the ED  Pt was given Levaquin and Vanco 1 dose   US doppler - no significant findings  Given  Hydromorphone

## 2019-10-07 NOTE — H&P ADULT - PROBLEM SELECTOR PLAN 1
- p/w Lt leg Cellulitis  increased erythema, tenderness  - warm to touch with some tenderness  - s/p vancomycin and levaquin in ED  - No Leukocytosis   - Afebrile   - c/w rocephin   - Recommended B/L leg elevation to the patient  - lactate 1.2  - c/w pain meds   - Doppler -ve for DVT  - f/u ESR , CRP  - f/u Blood Cx (Specimen received)  - f/u Urine Cx (Specimen received)  - f/u PT.   - f/u CM - p/w Lt leg Cellulitis  minimally increased erythema, tenderness  - warm to touch with some tenderness  - s/p vancomycin and levaquin in ED  - No Leukocytosis   - Afebrile   - c/w rocephin   - Recommended B/L leg elevation to the patient  - lactate 1.2  - c/w pain meds   - Doppler -ve for DVT  - f/u ESR , CRP  - f/u Blood Cx (Specimen received)  - f/u Urine Cx (Specimen received)  - f/u PT.   - f/u CM

## 2019-10-07 NOTE — H&P ADULT - PROBLEM SELECTOR PLAN 5
- ESRD Maintenance HD (M/W/F )   - last full dialysis today   - Cr: 4.79 on admission  - B/L pedal edema  - no crackles  or fluid overload  - Avoid Nephrotoxic Meds/ Agents such as (NSAIDs, IV contrast, Aminoglycosides such as gentamicin, Gadolinium contrast, Phosphate containing enemas, etc..)  - Adjust Medications according to eGFR  - Renal diet   - f/u CM (pt will need reinstatement of dialysis upon discharge)  ** Nephrology consulted Dr. La c/w home medications. Pt on amlodipine, hydralazine, metoprolol and lasix at home  c/w amlodipine, hydralazine, metoprolol and lasix with parameters  monitor BP and adjust accordingly

## 2019-10-08 ENCOUNTER — TRANSCRIPTION ENCOUNTER (OUTPATIENT)
Age: 39
End: 2019-10-08

## 2019-10-08 VITALS — SYSTOLIC BLOOD PRESSURE: 165 MMHG | DIASTOLIC BLOOD PRESSURE: 88 MMHG | HEART RATE: 82 BPM

## 2019-10-08 DIAGNOSIS — M79.652 PAIN IN LEFT THIGH: ICD-10-CM

## 2019-10-08 LAB
ALBUMIN SERPL ELPH-MCNC: 2.8 G/DL — LOW (ref 3.5–5)
ALP SERPL-CCNC: 252 U/L — HIGH (ref 40–120)
ALT FLD-CCNC: 21 U/L DA — SIGNIFICANT CHANGE UP (ref 10–60)
ANION GAP SERPL CALC-SCNC: 4 MMOL/L — LOW (ref 5–17)
AST SERPL-CCNC: 16 U/L — SIGNIFICANT CHANGE UP (ref 10–40)
BASOPHILS # BLD AUTO: 0.05 K/UL — SIGNIFICANT CHANGE UP (ref 0–0.2)
BASOPHILS NFR BLD AUTO: 0.8 % — SIGNIFICANT CHANGE UP (ref 0–2)
BILIRUB SERPL-MCNC: 0.6 MG/DL — SIGNIFICANT CHANGE UP (ref 0.2–1.2)
BUN SERPL-MCNC: 36 MG/DL — HIGH (ref 7–18)
CALCIUM SERPL-MCNC: 9.4 MG/DL — SIGNIFICANT CHANGE UP (ref 8.4–10.5)
CHLORIDE SERPL-SCNC: 93 MMOL/L — LOW (ref 96–108)
CO2 SERPL-SCNC: 36 MMOL/L — HIGH (ref 22–31)
CREAT SERPL-MCNC: 6.32 MG/DL — HIGH (ref 0.5–1.3)
CRP SERPL-MCNC: 7.22 MG/DL — HIGH (ref 0–0.4)
CULTURE RESULTS: NO GROWTH — SIGNIFICANT CHANGE UP
EOSINOPHIL # BLD AUTO: 0.47 K/UL — SIGNIFICANT CHANGE UP (ref 0–0.5)
EOSINOPHIL NFR BLD AUTO: 7.6 % — HIGH (ref 0–6)
ERYTHROCYTE [SEDIMENTATION RATE] IN BLOOD: 74 MM/HR — HIGH (ref 0–15)
GLUCOSE BLDC GLUCOMTR-MCNC: 111 MG/DL — HIGH (ref 70–99)
GLUCOSE BLDC GLUCOMTR-MCNC: 126 MG/DL — HIGH (ref 70–99)
GLUCOSE SERPL-MCNC: 101 MG/DL — HIGH (ref 70–99)
HCT VFR BLD CALC: 30.9 % — LOW (ref 39–50)
HGB BLD-MCNC: 9 G/DL — LOW (ref 13–17)
IMM GRANULOCYTES NFR BLD AUTO: 0.3 % — SIGNIFICANT CHANGE UP (ref 0–1.5)
LYMPHOCYTES # BLD AUTO: 0.79 K/UL — LOW (ref 1–3.3)
LYMPHOCYTES # BLD AUTO: 12.8 % — LOW (ref 13–44)
MAGNESIUM SERPL-MCNC: 2.2 MG/DL — SIGNIFICANT CHANGE UP (ref 1.6–2.6)
MCHC RBC-ENTMCNC: 26.9 PG — LOW (ref 27–34)
MCHC RBC-ENTMCNC: 29.1 GM/DL — LOW (ref 32–36)
MCV RBC AUTO: 92.2 FL — SIGNIFICANT CHANGE UP (ref 80–100)
MONOCYTES # BLD AUTO: 1.31 K/UL — HIGH (ref 0–0.9)
MONOCYTES NFR BLD AUTO: 21.2 % — HIGH (ref 2–14)
NEUTROPHILS # BLD AUTO: 3.53 K/UL — SIGNIFICANT CHANGE UP (ref 1.8–7.4)
NEUTROPHILS NFR BLD AUTO: 57.3 % — SIGNIFICANT CHANGE UP (ref 43–77)
NRBC # BLD: 0 /100 WBCS — SIGNIFICANT CHANGE UP (ref 0–0)
PHOSPHATE SERPL-MCNC: 5.4 MG/DL — HIGH (ref 2.5–4.5)
PLATELET # BLD AUTO: 265 K/UL — SIGNIFICANT CHANGE UP (ref 150–400)
POTASSIUM SERPL-MCNC: 4.4 MMOL/L — SIGNIFICANT CHANGE UP (ref 3.5–5.3)
POTASSIUM SERPL-SCNC: 4.4 MMOL/L — SIGNIFICANT CHANGE UP (ref 3.5–5.3)
PROT SERPL-MCNC: 7.6 G/DL — SIGNIFICANT CHANGE UP (ref 6–8.3)
RBC # BLD: 3.35 M/UL — LOW (ref 4.2–5.8)
RBC # FLD: 18.2 % — HIGH (ref 10.3–14.5)
SODIUM SERPL-SCNC: 133 MMOL/L — LOW (ref 135–145)
SPECIMEN SOURCE: SIGNIFICANT CHANGE UP
WBC # BLD: 6.17 K/UL — SIGNIFICANT CHANGE UP (ref 3.8–10.5)
WBC # FLD AUTO: 6.17 K/UL — SIGNIFICANT CHANGE UP (ref 3.8–10.5)

## 2019-10-08 PROCEDURE — 93005 ELECTROCARDIOGRAM TRACING: CPT

## 2019-10-08 PROCEDURE — 99285 EMERGENCY DEPT VISIT HI MDM: CPT | Mod: 25

## 2019-10-08 PROCEDURE — 87086 URINE CULTURE/COLONY COUNT: CPT

## 2019-10-08 PROCEDURE — 82962 GLUCOSE BLOOD TEST: CPT

## 2019-10-08 PROCEDURE — 96365 THER/PROPH/DIAG IV INF INIT: CPT

## 2019-10-08 PROCEDURE — 83605 ASSAY OF LACTIC ACID: CPT

## 2019-10-08 PROCEDURE — 94640 AIRWAY INHALATION TREATMENT: CPT

## 2019-10-08 PROCEDURE — 86140 C-REACTIVE PROTEIN: CPT

## 2019-10-08 PROCEDURE — 81001 URINALYSIS AUTO W/SCOPE: CPT

## 2019-10-08 PROCEDURE — 84100 ASSAY OF PHOSPHORUS: CPT

## 2019-10-08 PROCEDURE — 87040 BLOOD CULTURE FOR BACTERIA: CPT

## 2019-10-08 PROCEDURE — 82009 KETONE BODYS QUAL: CPT

## 2019-10-08 PROCEDURE — 96375 TX/PRO/DX INJ NEW DRUG ADDON: CPT

## 2019-10-08 PROCEDURE — 83880 ASSAY OF NATRIURETIC PEPTIDE: CPT

## 2019-10-08 PROCEDURE — 85610 PROTHROMBIN TIME: CPT

## 2019-10-08 PROCEDURE — 93971 EXTREMITY STUDY: CPT

## 2019-10-08 PROCEDURE — 71045 X-RAY EXAM CHEST 1 VIEW: CPT

## 2019-10-08 PROCEDURE — 80053 COMPREHEN METABOLIC PANEL: CPT

## 2019-10-08 PROCEDURE — 83735 ASSAY OF MAGNESIUM: CPT

## 2019-10-08 PROCEDURE — 85652 RBC SED RATE AUTOMATED: CPT

## 2019-10-08 PROCEDURE — 85027 COMPLETE CBC AUTOMATED: CPT

## 2019-10-08 PROCEDURE — 36415 COLL VENOUS BLD VENIPUNCTURE: CPT

## 2019-10-08 PROCEDURE — 85730 THROMBOPLASTIN TIME PARTIAL: CPT

## 2019-10-08 PROCEDURE — 96366 THER/PROPH/DIAG IV INF ADDON: CPT

## 2019-10-08 RX ORDER — ACETAMINOPHEN 500 MG
650 TABLET ORAL EVERY 4 HOURS
Refills: 0 | Status: DISCONTINUED | OUTPATIENT
Start: 2019-10-08 | End: 2019-10-08

## 2019-10-08 RX ORDER — OXYCODONE AND ACETAMINOPHEN 5; 325 MG/1; MG/1
1 TABLET ORAL ONCE
Refills: 0 | Status: DISCONTINUED | OUTPATIENT
Start: 2019-10-08 | End: 2019-10-08

## 2019-10-08 RX ORDER — TRAMADOL HYDROCHLORIDE 50 MG/1
1 TABLET ORAL
Qty: 21 | Refills: 0
Start: 2019-10-08 | End: 2019-10-14

## 2019-10-08 RX ORDER — ACETAMINOPHEN 500 MG
325 TABLET ORAL EVERY 4 HOURS
Refills: 0 | Status: DISCONTINUED | OUTPATIENT
Start: 2019-10-08 | End: 2019-10-08

## 2019-10-08 RX ADMIN — ALBUTEROL 2 PUFF(S): 90 AEROSOL, METERED ORAL at 08:55

## 2019-10-08 RX ADMIN — CARVEDILOL PHOSPHATE 6.25 MILLIGRAM(S): 80 CAPSULE, EXTENDED RELEASE ORAL at 06:27

## 2019-10-08 RX ADMIN — Medication 50 MILLIGRAM(S): at 06:27

## 2019-10-08 RX ADMIN — Medication 12 UNIT(S): at 12:16

## 2019-10-08 RX ADMIN — Medication 50 MILLIGRAM(S): at 13:20

## 2019-10-08 RX ADMIN — AMLODIPINE BESYLATE 2.5 MILLIGRAM(S): 2.5 TABLET ORAL at 06:27

## 2019-10-08 RX ADMIN — PANTOPRAZOLE SODIUM 40 MILLIGRAM(S): 20 TABLET, DELAYED RELEASE ORAL at 06:27

## 2019-10-08 RX ADMIN — SEVELAMER CARBONATE 800 MILLIGRAM(S): 2400 POWDER, FOR SUSPENSION ORAL at 08:55

## 2019-10-08 RX ADMIN — Medication 12 UNIT(S): at 08:23

## 2019-10-08 RX ADMIN — Medication 650 MILLIGRAM(S): at 04:23

## 2019-10-08 RX ADMIN — TRAMADOL HYDROCHLORIDE 50 MILLIGRAM(S): 50 TABLET ORAL at 03:53

## 2019-10-08 RX ADMIN — Medication 80 MILLIGRAM(S): at 06:28

## 2019-10-08 RX ADMIN — Medication 650 MILLIGRAM(S): at 03:53

## 2019-10-08 RX ADMIN — ALBUTEROL 2 PUFF(S): 90 AEROSOL, METERED ORAL at 03:57

## 2019-10-08 RX ADMIN — HEPARIN SODIUM 5000 UNIT(S): 5000 INJECTION INTRAVENOUS; SUBCUTANEOUS at 06:28

## 2019-10-08 NOTE — DISCHARGE NOTE PROVIDER - HOSPITAL COURSE
HPI:    39 years old male from home with PMHx of ESRD on MWF HD (via LUE AVF), DM (on insulin), cocaine abuse, COPD on home O2 (4L NC), morbid obesity, Bipolar disorder, schizophrenia and HTN presents to the ED with c/o L leg pain and swelling. As per patient he completed HD today and soon started developing the L leg pain. he describes pain as Burning pain that is constant and getting worse.. Patient is also complaining of abdominal pain in umbilical region. Which is non-radiating and intermittent in nature.    He was recentlty admitted 4 week ago for SOB in [facility].    Pt denies any chest pain, palpitations, nausea, SOB vomiting, change in bowel or urinary habits.    .       In the ED    Pt was given Levaquin and Vanco 1 dose     US doppler - no significant findings    Given  Hydromorphone (07 Oct 2019 18:53)            Hospital course:     Patient was admitted with left leg cellulitis and started on rocephin, blood cultures were sent.     patient is clinically stable for discharge.

## 2019-10-08 NOTE — DISCHARGE NOTE PROVIDER - NSDCCPCAREPLAN_GEN_ALL_CORE_FT
PRINCIPAL DISCHARGE DIAGNOSIS  Diagnosis: Cellulitis  Assessment and Plan of Treatment: You presented with cellulitis of left leg which was treated with intravenous antibiotics - rocephin during hospital stay. Cellulitis improved with treatment.      SECONDARY DISCHARGE DIAGNOSES  Diagnosis: ESRD on dialysis  Assessment and Plan of Treatment: stable during hospital stay  conitnue dialysis three times week    Diagnosis: HTN (hypertension)  Assessment and Plan of Treatment: well controlled during hospital stay, continue home medication regimen.  DASH diet- low salt diet    Diagnosis: COPD (chronic obstructive pulmonary disease)  Assessment and Plan of Treatment: well controlled during hospital stay, continue home medication regimen including inhalers.    Diagnosis: DM (diabetes mellitus)  Assessment and Plan of Treatment: well controlled during hospital stay. Continue home medication regimen. Low carb, small portion diet.    Diagnosis: Anemia  Assessment and Plan of Treatment: stable. from ESRD.   follow up with PCP.

## 2019-10-08 NOTE — DISCHARGE NOTE PROVIDER - NSDCFUSCHEDAPPT_GEN_ALL_CORE_FT
PRESTON JOHNSON ; 10/16/2019 ; N[facility] [address] [address] PRESTON Read ; 01/03/2020 ; N[facility] [address] [address] [address]  PRESTON JOHNSON ; 01/03/2020 ; P [address]

## 2019-10-08 NOTE — CHART NOTE - NSCHARTNOTEFT_GEN_A_CORE
Event: Pt states he has done his research and that opioids does not affect the kidney Event: Pt states he has done his research and that opioids does not affect the kidney, requesting stronger narcotics for leg pain    Vital Signs Last 24 Hrs  T(C): 36.7 (08 Oct 2019 00:31), Max: 37.3 (07 Oct 2019 23:44)  T(F): 98.1 (08 Oct 2019 00:31), Max: 99.1 (07 Oct 2019 23:44)  HR: 87 (08 Oct 2019 00:31) (82 - 87)  BP: 133/76 (08 Oct 2019 00:31) (112/67 - 160/83)  BP(mean): --  RR: 16 (08 Oct 2019 00:31) (16 - 18)  SpO2: 100% (08 Oct 2019 00:31) (97% - 100%)    FOCUSSED ASSESSMENT  GENERAL: Obese  HEAD:  Atraumatic, Normocephalic  EYES: EOMI, PERRLA, conjunctiva and sclera clear  NECK: Supple, No JVD  CHEST/LUNG: Clear to auscultation bilaterally; No wheezes or rales  HEART: Regular rate and rhythm; No murmurs, rubs, or gallops  ABDOMEN: Obese, Rounded, Nontender, Normal bowel sounds  EXTREMITIES:  2+ Peripheral Pulses, warm, 3 +edema, LROM, AV fistula rt arm  Neurological: AOx3  Skin: Warm and dry    Plan:  1. Pain management plan of care discussed, agreeable to Tylenol but requesting conversation with attending and pain management  2. Acetaminophen   Tablet .. 650 milliGRAM(s) Oral every 4 hours PRN Mild Pain (1 - 3), Moderate Pain (4 - 6) added  3. Cont gabapentin 300 milliGRAM(s) Oral at bedtime  4. Cont pantoprazole    Tablet 40 milliGRAM(s) Oral before breakfast  5. Cont PARoxetine 10 milliGRAM(s) Oral daily  6. Cont risperiDONE   Tablet 1 milliGRAM(s) Oral at bedtime  7. Cont methocarbamol 500 milliGRAM(s) Oral every 12 hours PRN spasms  8. Cont traMADol 50 milliGRAM(s) Oral every 6 hours PRN Severe Pain (7 - 10)  9. Cont HD MWF

## 2019-10-08 NOTE — DISCHARGE NOTE NURSING/CASE MANAGEMENT/SOCIAL WORK - PATIENT PORTAL LINK FT
You can access the FollowMyHealth Patient Portal offered by [facility] by registering at the following website: [email or web address]. By joining [facility]’s FollowMyHealth portal, you will also be able to view your health information using other applications (apps) compatible with our system.

## 2019-10-13 ENCOUNTER — INPATIENT (INPATIENT)
Facility: HOSPITAL | Age: 39
LOS: 1 days | Discharge: HOME | DRG: 682 | End: 2019-10-15
Attending: INTERNAL MEDICINE | Admitting: INTERNAL MEDICINE
Payer: MEDICARE

## 2019-10-13 VITALS
OXYGEN SATURATION: 99 % | TEMPERATURE: 98 F | RESPIRATION RATE: 18 BRPM | WEIGHT: 315 LBS | HEART RATE: 83 BPM | HEIGHT: 69 IN | SYSTOLIC BLOOD PRESSURE: 148 MMHG | DIASTOLIC BLOOD PRESSURE: 81 MMHG

## 2019-10-13 DIAGNOSIS — Z98.890 OTHER SPECIFIED POSTPROCEDURAL STATES: Chronic | ICD-10-CM

## 2019-10-13 LAB
ALBUMIN SERPL ELPH-MCNC: 2.9 G/DL — LOW (ref 3.5–5)
ALP SERPL-CCNC: 234 U/L — HIGH (ref 40–120)
ALT FLD-CCNC: 23 U/L DA — SIGNIFICANT CHANGE UP (ref 10–60)
ANION GAP SERPL CALC-SCNC: 9 MMOL/L — SIGNIFICANT CHANGE UP (ref 5–17)
APTT BLD: 24.6 SEC — LOW (ref 27.5–36.3)
AST SERPL-CCNC: 34 U/L — SIGNIFICANT CHANGE UP (ref 10–40)
BILIRUB SERPL-MCNC: 0.7 MG/DL — SIGNIFICANT CHANGE UP (ref 0.2–1.2)
BUN SERPL-MCNC: 43 MG/DL — HIGH (ref 7–18)
CALCIUM SERPL-MCNC: 9.9 MG/DL — SIGNIFICANT CHANGE UP (ref 8.4–10.5)
CHLORIDE SERPL-SCNC: 93 MMOL/L — LOW (ref 96–108)
CO2 SERPL-SCNC: 29 MMOL/L — SIGNIFICANT CHANGE UP (ref 22–31)
CREAT SERPL-MCNC: 8.22 MG/DL — HIGH (ref 0.5–1.3)
D DIMER BLD IA.RAPID-MCNC: 2294 NG/ML DDU — HIGH
GLUCOSE SERPL-MCNC: 139 MG/DL — HIGH (ref 70–99)
HCT VFR BLD CALC: 32.8 % — LOW (ref 39–50)
HGB BLD-MCNC: 9.4 G/DL — LOW (ref 13–17)
INR BLD: 1.07 RATIO — SIGNIFICANT CHANGE UP (ref 0.88–1.16)
MCHC RBC-ENTMCNC: 27.3 PG — SIGNIFICANT CHANGE UP (ref 27–34)
MCHC RBC-ENTMCNC: 28.7 GM/DL — LOW (ref 32–36)
MCV RBC AUTO: 95.3 FL — SIGNIFICANT CHANGE UP (ref 80–100)
NRBC # BLD: 0 /100 WBCS — SIGNIFICANT CHANGE UP (ref 0–0)
NT-PROBNP SERPL-SCNC: HIGH PG/ML (ref 0–125)
PLATELET # BLD AUTO: 239 K/UL — SIGNIFICANT CHANGE UP (ref 150–400)
POTASSIUM SERPL-MCNC: 5.7 MMOL/L — HIGH (ref 3.5–5.3)
POTASSIUM SERPL-SCNC: 5.7 MMOL/L — HIGH (ref 3.5–5.3)
PROT SERPL-MCNC: 8 G/DL — SIGNIFICANT CHANGE UP (ref 6–8.3)
PROTHROM AB SERPL-ACNC: 11.9 SEC — SIGNIFICANT CHANGE UP (ref 10–12.9)
RBC # BLD: 3.44 M/UL — LOW (ref 4.2–5.8)
RBC # FLD: 18.6 % — HIGH (ref 10.3–14.5)
SODIUM SERPL-SCNC: 131 MMOL/L — LOW (ref 135–145)
TROPONIN I SERPL-MCNC: <0.015 NG/ML — SIGNIFICANT CHANGE UP (ref 0–0.04)
WBC # BLD: 7.43 K/UL — SIGNIFICANT CHANGE UP (ref 3.8–10.5)
WBC # FLD AUTO: 7.43 K/UL — SIGNIFICANT CHANGE UP (ref 3.8–10.5)

## 2019-10-13 PROCEDURE — 71045 X-RAY EXAM CHEST 1 VIEW: CPT | Mod: 26

## 2019-10-13 RX ORDER — IPRATROPIUM/ALBUTEROL SULFATE 18-103MCG
3 AEROSOL WITH ADAPTER (GRAM) INHALATION ONCE
Refills: 0 | Status: COMPLETED | OUTPATIENT
Start: 2019-10-13 | End: 2019-10-13

## 2019-10-13 RX ORDER — FUROSEMIDE 40 MG
80 TABLET ORAL ONCE
Refills: 0 | Status: COMPLETED | OUTPATIENT
Start: 2019-10-13 | End: 2019-10-13

## 2019-10-13 RX ORDER — ALBUTEROL 90 UG/1
2.5 AEROSOL, METERED ORAL ONCE
Refills: 0 | Status: COMPLETED | OUTPATIENT
Start: 2019-10-13 | End: 2019-10-13

## 2019-10-13 RX ADMIN — Medication 3 MILLILITER(S): at 22:32

## 2019-10-13 NOTE — ED PROVIDER NOTE - OBJECTIVE STATEMENT
38 yo male with PMhx of ESRD on hemodialysis on M/W/F, COPD, HTN, DM , schizophrenia, hernia repair, presents with intermittent shortness of breath while trying to sleep. Patient states he usually sleeps with one pillow. Patient also reports increasing bilateral lower extremity swelling since yesterday and fever T Max 100 F. Patient reports that he had 6 unit removed 2 days ago at hemodialysis. Patient states he goes to [facility] and he is followed by Dr. La. Patient reports he has been on 4.5 L of at home oxygen for the last 4 months. Patient denies cough. 40 yo male with PMhx of ESRD on hemodialysis on M/W/F, COPD, HTN, DM , schizophrenia, hernia repair, presents with intermittent shortness of breath while trying to sleep. Patient states he usually sleeps with one pillow. Patient also reports increasing bilateral lower extremity swelling since yesterday and fever T Max 100 F. Patient reports that he had 6 unit removed 2 days ago at hemodialysis. Patient states he goes to [facility] and he is followed by Dr. La. Patient reports he has been on 4.5 L of at home oxygen for the last 4 months. Patient denies cough.

## 2019-10-13 NOTE — ED PROVIDER NOTE - CLINICAL SUMMARY MEDICAL DECISION MAKING FREE TEXT BOX
40 yo male with PM hx of ESRD on HD, COPD on home oxygen 4.5 L, HTN, DM, presents with PND for past day and increased lower extremity swelling. Will obtain EKG, labs, CXR. Patient given duo neb. Will reassess. 40 yo male with PM hx of ESRD on HD, COPD on home oxygen 4.5 L, HTN, DM, presents with PND for past day and increased lower extremity swelling. Will obtain EKG, labs, CXR. Patient given duo neb. Will reassess.    labs show wbc wnl, trop neg, proBNP> 30K, ddimer elevated-but previously elevated as well, CXR shows pulmonary edema  K 5.7-given albuterol nebs and lasix 80mg IV  Discussed above with Dr. La-nephrologist who agrees with plan to admit for IV diuresis and HD in am

## 2019-10-13 NOTE — ED PROVIDER NOTE - MUSCULOSKELETAL, MLM
2+ lower extremity pitting edema bilaterally. Spine appears normal, range of motion is not limited, no muscle or joint tenderness.

## 2019-10-13 NOTE — ED PROVIDER NOTE - PMH
Bipolar disorder    COPD (chronic obstructive pulmonary disease)    DM (diabetes mellitus)    ESRD on dialysis    HTN (hypertension)    Migraine    Morbid obesity with BMI of 40.0-44.9, adult    Schizophrenia

## 2019-10-13 NOTE — ED PROVIDER NOTE - CARE PLAN
Principal Discharge DX:	Acute pulmonary edema  Secondary Diagnosis:	ESRD (end stage renal disease) on dialysis  Secondary Diagnosis:	Hyperkalemia

## 2019-10-14 ENCOUNTER — TRANSCRIPTION ENCOUNTER (OUTPATIENT)
Age: 39
End: 2019-10-14

## 2019-10-14 DIAGNOSIS — Z29.9 ENCOUNTER FOR PROPHYLACTIC MEASURES, UNSPECIFIED: ICD-10-CM

## 2019-10-14 DIAGNOSIS — I10 ESSENTIAL (PRIMARY) HYPERTENSION: ICD-10-CM

## 2019-10-14 DIAGNOSIS — E11.9 TYPE 2 DIABETES MELLITUS WITHOUT COMPLICATIONS: ICD-10-CM

## 2019-10-14 DIAGNOSIS — J81.0 ACUTE PULMONARY EDEMA: ICD-10-CM

## 2019-10-14 DIAGNOSIS — E87.5 HYPERKALEMIA: ICD-10-CM

## 2019-10-14 DIAGNOSIS — N18.6 END STAGE RENAL DISEASE: ICD-10-CM

## 2019-10-14 DIAGNOSIS — M79.606 PAIN IN LEG, UNSPECIFIED: ICD-10-CM

## 2019-10-14 PROBLEM — J44.9 CHRONIC OBSTRUCTIVE PULMONARY DISEASE, UNSPECIFIED: Chronic | Status: ACTIVE | Noted: 2019-10-07

## 2019-10-14 LAB
ALBUMIN SERPL ELPH-MCNC: 3 G/DL — LOW (ref 3.5–5)
ALP SERPL-CCNC: 230 U/L — HIGH (ref 40–120)
ALT FLD-CCNC: 21 U/L DA — SIGNIFICANT CHANGE UP (ref 10–60)
ANION GAP SERPL CALC-SCNC: 9 MMOL/L — SIGNIFICANT CHANGE UP (ref 5–17)
AST SERPL-CCNC: 17 U/L — SIGNIFICANT CHANGE UP (ref 10–40)
BILIRUB SERPL-MCNC: 0.6 MG/DL — SIGNIFICANT CHANGE UP (ref 0.2–1.2)
BUN SERPL-MCNC: 46 MG/DL — HIGH (ref 7–18)
CALCIUM SERPL-MCNC: 9.7 MG/DL — SIGNIFICANT CHANGE UP (ref 8.4–10.5)
CHLORIDE SERPL-SCNC: 93 MMOL/L — LOW (ref 96–108)
CO2 SERPL-SCNC: 33 MMOL/L — HIGH (ref 22–31)
CREAT SERPL-MCNC: 8.46 MG/DL — HIGH (ref 0.5–1.3)
GLUCOSE BLDC GLUCOMTR-MCNC: 108 MG/DL — HIGH (ref 70–99)
GLUCOSE BLDC GLUCOMTR-MCNC: 115 MG/DL — HIGH (ref 70–99)
GLUCOSE BLDC GLUCOMTR-MCNC: 131 MG/DL — HIGH (ref 70–99)
GLUCOSE BLDC GLUCOMTR-MCNC: 137 MG/DL — HIGH (ref 70–99)
GLUCOSE SERPL-MCNC: 114 MG/DL — HIGH (ref 70–99)
HCT VFR BLD CALC: 30 % — LOW (ref 39–50)
HGB BLD-MCNC: 9 G/DL — LOW (ref 13–17)
MAGNESIUM SERPL-MCNC: 2.4 MG/DL — SIGNIFICANT CHANGE UP (ref 1.6–2.6)
MCHC RBC-ENTMCNC: 27.4 PG — SIGNIFICANT CHANGE UP (ref 27–34)
MCHC RBC-ENTMCNC: 30 GM/DL — LOW (ref 32–36)
MCV RBC AUTO: 91.5 FL — SIGNIFICANT CHANGE UP (ref 80–100)
NRBC # BLD: 0 /100 WBCS — SIGNIFICANT CHANGE UP (ref 0–0)
PHOSPHATE SERPL-MCNC: 5.2 MG/DL — HIGH (ref 2.5–4.5)
PLATELET # BLD AUTO: 250 K/UL — SIGNIFICANT CHANGE UP (ref 150–400)
POTASSIUM SERPL-MCNC: 5.2 MMOL/L — SIGNIFICANT CHANGE UP (ref 3.5–5.3)
POTASSIUM SERPL-SCNC: 5.2 MMOL/L — SIGNIFICANT CHANGE UP (ref 3.5–5.3)
PROT SERPL-MCNC: 8 G/DL — SIGNIFICANT CHANGE UP (ref 6–8.3)
RBC # BLD: 3.28 M/UL — LOW (ref 4.2–5.8)
RBC # FLD: 18.9 % — HIGH (ref 10.3–14.5)
SODIUM SERPL-SCNC: 135 MMOL/L — SIGNIFICANT CHANGE UP (ref 135–145)
WBC # BLD: 8.06 K/UL — SIGNIFICANT CHANGE UP (ref 3.8–10.5)
WBC # FLD AUTO: 8.06 K/UL — SIGNIFICANT CHANGE UP (ref 3.8–10.5)

## 2019-10-14 PROCEDURE — 99285 EMERGENCY DEPT VISIT HI MDM: CPT

## 2019-10-14 PROCEDURE — 99223 1ST HOSP IP/OBS HIGH 75: CPT

## 2019-10-14 RX ORDER — VENLAFAXINE HCL 75 MG
100 CAPSULE, EXT RELEASE 24 HR ORAL DAILY
Refills: 0 | Status: DISCONTINUED | OUTPATIENT
Start: 2019-10-14 | End: 2019-10-14

## 2019-10-14 RX ORDER — CHLORHEXIDINE GLUCONATE 213 G/1000ML
1 SOLUTION TOPICAL DAILY
Refills: 0 | Status: DISCONTINUED | OUTPATIENT
Start: 2019-10-14 | End: 2019-10-15

## 2019-10-14 RX ORDER — CARVEDILOL PHOSPHATE 80 MG/1
6.25 CAPSULE, EXTENDED RELEASE ORAL EVERY 12 HOURS
Refills: 0 | Status: DISCONTINUED | OUTPATIENT
Start: 2019-10-14 | End: 2019-10-15

## 2019-10-14 RX ORDER — ASCORBIC ACID 60 MG
500 TABLET,CHEWABLE ORAL DAILY
Refills: 0 | Status: DISCONTINUED | OUTPATIENT
Start: 2019-10-14 | End: 2019-10-15

## 2019-10-14 RX ORDER — MORPHINE SULFATE 50 MG/1
0.5 CAPSULE, EXTENDED RELEASE ORAL ONCE
Refills: 0 | Status: DISCONTINUED | OUTPATIENT
Start: 2019-10-14 | End: 2019-10-14

## 2019-10-14 RX ORDER — FUROSEMIDE 40 MG
80 TABLET ORAL DAILY
Refills: 0 | Status: DISCONTINUED | OUTPATIENT
Start: 2019-10-14 | End: 2019-10-15

## 2019-10-14 RX ORDER — RISPERIDONE 4 MG/1
1 TABLET ORAL AT BEDTIME
Refills: 0 | Status: DISCONTINUED | OUTPATIENT
Start: 2019-10-14 | End: 2019-10-15

## 2019-10-14 RX ORDER — SEVELAMER CARBONATE 2400 MG/1
1 POWDER, FOR SUSPENSION ORAL
Qty: 0 | Refills: 0 | DISCHARGE
Start: 2019-10-14

## 2019-10-14 RX ORDER — SEVELAMER CARBONATE 2400 MG/1
800 POWDER, FOR SUSPENSION ORAL
Refills: 0 | Status: DISCONTINUED | OUTPATIENT
Start: 2019-10-14 | End: 2019-10-15

## 2019-10-14 RX ORDER — DEXTROSE 50 % IN WATER 50 %
15 SYRINGE (ML) INTRAVENOUS ONCE
Refills: 0 | Status: DISCONTINUED | OUTPATIENT
Start: 2019-10-14 | End: 2019-10-15

## 2019-10-14 RX ORDER — FERROUS SULFATE 325(65) MG
325 TABLET ORAL DAILY
Refills: 0 | Status: DISCONTINUED | OUTPATIENT
Start: 2019-10-14 | End: 2019-10-15

## 2019-10-14 RX ORDER — TRAMADOL HYDROCHLORIDE 50 MG/1
50 TABLET ORAL EVERY 8 HOURS
Refills: 0 | Status: DISCONTINUED | OUTPATIENT
Start: 2019-10-14 | End: 2019-10-15

## 2019-10-14 RX ORDER — SIMVASTATIN 20 MG/1
40 TABLET, FILM COATED ORAL AT BEDTIME
Refills: 0 | Status: DISCONTINUED | OUTPATIENT
Start: 2019-10-14 | End: 2019-10-15

## 2019-10-14 RX ORDER — GABAPENTIN 400 MG/1
300 CAPSULE ORAL AT BEDTIME
Refills: 0 | Status: DISCONTINUED | OUTPATIENT
Start: 2019-10-14 | End: 2019-10-15

## 2019-10-14 RX ORDER — INSULIN LISPRO 100/ML
VIAL (ML) SUBCUTANEOUS
Refills: 0 | Status: DISCONTINUED | OUTPATIENT
Start: 2019-10-14 | End: 2019-10-15

## 2019-10-14 RX ORDER — ERYTHROPOIETIN 10000 [IU]/ML
4000 INJECTION, SOLUTION INTRAVENOUS; SUBCUTANEOUS
Refills: 0 | Status: DISCONTINUED | OUTPATIENT
Start: 2019-10-14 | End: 2019-10-15

## 2019-10-14 RX ORDER — MORPHINE SULFATE 50 MG/1
2 CAPSULE, EXTENDED RELEASE ORAL ONCE
Refills: 0 | Status: DISCONTINUED | OUTPATIENT
Start: 2019-10-14 | End: 2019-10-14

## 2019-10-14 RX ORDER — INFLUENZA VIRUS VACCINE 15; 15; 15; 15 UG/.5ML; UG/.5ML; UG/.5ML; UG/.5ML
0.5 SUSPENSION INTRAMUSCULAR ONCE
Refills: 0 | Status: DISCONTINUED | OUTPATIENT
Start: 2019-10-14 | End: 2019-10-15

## 2019-10-14 RX ORDER — PANTOPRAZOLE SODIUM 20 MG/1
40 TABLET, DELAYED RELEASE ORAL
Refills: 0 | Status: DISCONTINUED | OUTPATIENT
Start: 2019-10-14 | End: 2019-10-15

## 2019-10-14 RX ORDER — FOLIC ACID 0.8 MG
1 TABLET ORAL DAILY
Refills: 0 | Status: DISCONTINUED | OUTPATIENT
Start: 2019-10-14 | End: 2019-10-15

## 2019-10-14 RX ORDER — ALBUTEROL 90 UG/1
2 AEROSOL, METERED ORAL EVERY 6 HOURS
Refills: 0 | Status: DISCONTINUED | OUTPATIENT
Start: 2019-10-14 | End: 2019-10-15

## 2019-10-14 RX ORDER — HEPARIN SODIUM 5000 [USP'U]/ML
5000 INJECTION INTRAVENOUS; SUBCUTANEOUS EVERY 12 HOURS
Refills: 0 | Status: DISCONTINUED | OUTPATIENT
Start: 2019-10-14 | End: 2019-10-15

## 2019-10-14 RX ORDER — OXYCODONE AND ACETAMINOPHEN 5; 325 MG/1; MG/1
2 TABLET ORAL EVERY 6 HOURS
Refills: 0 | Status: DISCONTINUED | OUTPATIENT
Start: 2019-10-14 | End: 2019-10-15

## 2019-10-14 RX ORDER — CLOPIDOGREL BISULFATE 75 MG/1
75 TABLET, FILM COATED ORAL DAILY
Refills: 0 | Status: DISCONTINUED | OUTPATIENT
Start: 2019-10-14 | End: 2019-10-15

## 2019-10-14 RX ADMIN — SEVELAMER CARBONATE 800 MILLIGRAM(S): 2400 POWDER, FOR SUSPENSION ORAL at 10:02

## 2019-10-14 RX ADMIN — Medication 500 MILLIGRAM(S): at 11:20

## 2019-10-14 RX ADMIN — ALBUTEROL 2 PUFF(S): 90 AEROSOL, METERED ORAL at 03:20

## 2019-10-14 RX ADMIN — ALBUTEROL 2.5 MILLIGRAM(S): 90 AEROSOL, METERED ORAL at 00:30

## 2019-10-14 RX ADMIN — MORPHINE SULFATE 2 MILLIGRAM(S): 50 CAPSULE, EXTENDED RELEASE ORAL at 09:48

## 2019-10-14 RX ADMIN — ALBUTEROL 2 PUFF(S): 90 AEROSOL, METERED ORAL at 10:54

## 2019-10-14 RX ADMIN — HEPARIN SODIUM 5000 UNIT(S): 5000 INJECTION INTRAVENOUS; SUBCUTANEOUS at 07:18

## 2019-10-14 RX ADMIN — CHLORHEXIDINE GLUCONATE 1 APPLICATION(S): 213 SOLUTION TOPICAL at 17:31

## 2019-10-14 RX ADMIN — MORPHINE SULFATE 0.5 MILLIGRAM(S): 50 CAPSULE, EXTENDED RELEASE ORAL at 07:13

## 2019-10-14 RX ADMIN — ALBUTEROL 2 PUFF(S): 90 AEROSOL, METERED ORAL at 23:27

## 2019-10-14 RX ADMIN — MORPHINE SULFATE 2 MILLIGRAM(S): 50 CAPSULE, EXTENDED RELEASE ORAL at 09:33

## 2019-10-14 RX ADMIN — OXYCODONE AND ACETAMINOPHEN 2 TABLET(S): 5; 325 TABLET ORAL at 22:20

## 2019-10-14 RX ADMIN — OXYCODONE AND ACETAMINOPHEN 2 TABLET(S): 5; 325 TABLET ORAL at 23:00

## 2019-10-14 RX ADMIN — Medication 325 MILLIGRAM(S): at 11:20

## 2019-10-14 RX ADMIN — SEVELAMER CARBONATE 800 MILLIGRAM(S): 2400 POWDER, FOR SUSPENSION ORAL at 22:20

## 2019-10-14 RX ADMIN — TRAMADOL HYDROCHLORIDE 50 MILLIGRAM(S): 50 TABLET ORAL at 02:20

## 2019-10-14 RX ADMIN — ALBUTEROL 2 PUFF(S): 90 AEROSOL, METERED ORAL at 17:30

## 2019-10-14 RX ADMIN — CLOPIDOGREL BISULFATE 75 MILLIGRAM(S): 75 TABLET, FILM COATED ORAL at 11:20

## 2019-10-14 RX ADMIN — SIMVASTATIN 40 MILLIGRAM(S): 20 TABLET, FILM COATED ORAL at 22:20

## 2019-10-14 RX ADMIN — Medication 10 MILLIGRAM(S): at 17:31

## 2019-10-14 RX ADMIN — HEPARIN SODIUM 5000 UNIT(S): 5000 INJECTION INTRAVENOUS; SUBCUTANEOUS at 17:30

## 2019-10-14 RX ADMIN — CARVEDILOL PHOSPHATE 6.25 MILLIGRAM(S): 80 CAPSULE, EXTENDED RELEASE ORAL at 05:20

## 2019-10-14 RX ADMIN — MORPHINE SULFATE 0.5 MILLIGRAM(S): 50 CAPSULE, EXTENDED RELEASE ORAL at 07:28

## 2019-10-14 RX ADMIN — Medication 80 MILLIGRAM(S): at 05:20

## 2019-10-14 RX ADMIN — SEVELAMER CARBONATE 800 MILLIGRAM(S): 2400 POWDER, FOR SUSPENSION ORAL at 17:31

## 2019-10-14 RX ADMIN — RISPERIDONE 1 MILLIGRAM(S): 4 TABLET ORAL at 22:20

## 2019-10-14 RX ADMIN — PANTOPRAZOLE SODIUM 40 MILLIGRAM(S): 20 TABLET, DELAYED RELEASE ORAL at 07:16

## 2019-10-14 RX ADMIN — Medication 1 MILLIGRAM(S): at 11:20

## 2019-10-14 NOTE — DISCHARGE NOTE PROVIDER - CARE PROVIDER_API CALL
Isac La)  Internal Medicine; Nephrology  [address] [place] [address]  Phone: [phone]  Fax: [phone]  Follow Up Time:

## 2019-10-14 NOTE — H&P ADULT - PROBLEM SELECTOR PLAN 7
IMPROVE VTE Individual Risk Assessment    RISK                                                                Points  [  ] Previous VTE                                                  3  [  ] Thrombophilia                                               2  [  ] Lower limb paralysis                                      2        (unable to hold up >15 seconds)    [  ] Current Cancer                                              2         (within 6 months)  [ x ] Immobilization > 24 hrs                                1  [  ] ICU/CCU stay > 24 hours                              1  [  ] Age > 60                                                      1  IMPROVE VTE Score _____1____  Considering co-morbid condition and relative immobility  Will give Heparin s/c

## 2019-10-14 NOTE — H&P ADULT - HISTORY OF PRESENT ILLNESS
Pt is a 39 years old male from home with PMHx of ESRD on MWF HD (via LUE AVF), DM (on insulin), cocaine abuse, COPD on home O2 (4L NC), morbid obesity, Bipolar disorder, schizophrenia and HTN came to ED today for shortness of breath that started yesterday. Pt states to follow dialysis schedule and fluid compliance but yesterday he started having shortness of breath that continued to worsen and her came to ED today. Pt denies any syncope, chest pain, palpitations, belly pain, N/V/D. Pt does report b/l leg pain.    In the ED pt was placed on oxygen inhalation, labs were significant for elevated potassium and BNP. CXR showed worsening congestion. ER Attending spoke with nephrologist and pt is being admitted for observation and dialysis in am

## 2019-10-14 NOTE — H&P ADULT - PROBLEM SELECTOR PLAN 5
Pt is on amlodipine, hydralazine, lasix and metoprolol  c/w lasix and metoprolol for now  Add remaining medications if BP is elevated

## 2019-10-14 NOTE — H&P ADULT - PROBLEM SELECTOR PLAN 1
Pt comes in with shortness of breath  CXR shows worsening edema with elevated BNP  Plan for dialysis in am as per Dr La  Pt states complaince with dialysis and fluid restriction

## 2019-10-14 NOTE — H&P ADULT - ASSESSMENT
Pt is a 39 yr old male with ESRD on dialysis well known to our hospital come sin with shortness of breath with imaging suggestive of pulmonary congestion. Pt is being admitted for dialysis in morning and observation

## 2019-10-14 NOTE — DISCHARGE NOTE PROVIDER - NSDCCPCAREPLAN_GEN_ALL_CORE_FT
PRINCIPAL DISCHARGE DIAGNOSIS  Diagnosis: Acute pulmonary edema  Assessment and Plan of Treatment: You came win with shortnes of breath in the ED.  You have a known hiostory of ESRD and your symptoms are most likely due to fluid overload. You were managed with Oxygen supplementation here and you had undergone a Dialysis session here. Your symptoms improved during the hospital stay. You are advised jose guadalupe follow up with your PCP within a week from discharge or if the symptoms worsen. Please continue with your dialysis schedule M/W/F.      SECONDARY DISCHARGE DIAGNOSES  Diagnosis: Hyperkalemia  Assessment and Plan of Treatment: Upon admission your potassium levels were mildly elevated. You had undergone a dialysis session here. Your repeat potassium levels returned to normal.    Diagnosis: ESRD (end stage renal disease) on dialysis  Assessment and Plan of Treatment: You have a knwon history of ESRD and on dialysis outiside. You were seen and evaluated by the Nephrologist here. You recieved 1 session of dialysis that improved your symptoms. Please continue with your dialysis schedule M/W/F.

## 2019-10-14 NOTE — H&P ADULT - PROBLEM SELECTOR PLAN 6
Pt has chronic leg pain and underwent workup including Dopplers that was negative  c/w home pain medication regimen

## 2019-10-14 NOTE — PATIENT PROFILE ADULT - NSPROGENOTHERPROVIDER_GEN_A_NUR
MA called patient and confirmed the following surgery and date:    Scheduled pt for Left Axillary Cyst Excision on 5/1/2019 at 1:00 pm with Dr. Anamaria Wooten. Pt needs to arrive at [address] [place] [address] at 11:00 am.    Patient is scheduled for post op on  5/14/19 at 3:00 pm in the [facility] office. Patient Confirmed surgery and post op appointment dates and times and verbalized understanding.     Electronically signed by Brianna Norman on 4/10/19 at 10:00 AM Hemodialysis. Home oxygen/outpatient care/respiratory services

## 2019-10-14 NOTE — CHART NOTE - NSCHARTNOTEFT_GEN_A_CORE
Patient refusing to be discharged due to excruciating bilateral leg pain. Patient describes as a stabbing feeling. At home patient takes medications from his mom to relieve the pain. On exam patient describes tenderness that feels like a stabbing feeling. Ordered percocet prn for patient for 2 doses. Morning team please re-evaluate

## 2019-10-14 NOTE — DISCHARGE NOTE PROVIDER - HOSPITAL COURSE
HPI:    Pt is a 39 years old male from home with PMHx of ESRD on MWF HD (via LUE AVF), DM (on insulin), cocaine abuse, COPD on home O2 (4L NC), morbid obesity, Bipolar disorder, schizophrenia and HTN came to ED today for shortness of breath that started yesterday. Pt states to follow dialysis schedule and fluid compliance but yesterday he started having shortness of breath that continued to worsen and her came to ED today. Pt denies any syncope, chest pain, palpitations, belly pain, N/V/D. Pt does report b/l leg pain.      In the ED pt was placed on oxygen inhalation, labs were significant for elevated potassium and BNP. CXR showed worsening congestion. ER Attending spoke with nephrologist and pt is being admitted for observation and dialysis in am (14 Oct 2019 00:48)    He has history of multiple admissions in the past for similar complaints.     He had a detailed evaluation for his symptoms. His chest xray showed worsening pulmonary edema. his Potassium were elevated on admission 5.7. He had a dialysis session today.     He was also managed with pain medication for his severe leg pain. Pt otherwise sitting comfortably but begins to cry and requewsting for pain medication when interviewed.

## 2019-10-14 NOTE — H&P ADULT - NSHPPHYSICALEXAM_GEN_ALL_CORE
PHYSICAL EXAM:  GENERAL: NAD, speaks in full sentences, no signs of respiratory distress  HEAD:  Atraumatic, Normocephalic  EYES: EOMI, PERRLA, conjunctiva and sclera clear  NECK: Supple, No JVD  CHEST/LUNG: b/l crackles on bases  HEART: Regular rate and rhythm; No murmurs;   ABDOMEN: Soft, Nontender, Nondistended; Bowel sounds present; No guarding  PSYCH: AAOx3  NEUROLOGY: non-focal  SKIN: No rashes or lesions

## 2019-10-14 NOTE — H&P ADULT - NSHPREVIEWOFSYSTEMS_GEN_ALL_CORE
REVIEW OF SYSTEMS:    CONSTITUTIONAL: No weakness, fevers or chills  EYES/ENT: No visual changes;  No vertigo or throat pain   NECK: No pain or stiffness  RESPIRATORY: Shortness of breath  CARDIOVASCULAR: No chest pain or palpitations  GASTROINTESTINAL: No abdominal or epigastric pain. No nausea, vomiting, or hematemesis; No diarrhea or constipation. No melena or hematochezia.  GENITOURINARY: No dysuria, frequency or hematuria  NEUROLOGICAL: No numbness or weakness  SKIN: No itching, rashes  B/L Leg pain

## 2019-10-14 NOTE — H&P ADULT - PROBLEM SELECTOR PLAN 3
Pt potassium 5.7 in ED with no EKG changes  Will give albuterol for and repeat potassium in am  Pt is planned for dialysis in morning  Monitor serum electrolytes

## 2019-10-14 NOTE — DISCHARGE NOTE PROVIDER - NSDCFUSCHEDAPPT_GEN_ALL_CORE_FT
PRESTON JOHNSON ; 10/16/2019 ; NPP Surg [address] [address] PRESTON Read ; 01/03/2020 ; NPP [address]  PRESTON JOHNSON ; 01/03/2020 ; P [address] PRESTON JOHNSON ; 01/03/2020 ; NPP [address] [address]  PRESTON JOHNSON ; 01/03/2020 ; NPP [address]urg [address]

## 2019-10-14 NOTE — H&P ADULT - ATTENDING COMMENTS
Patient was seen with Housestaff, Dr Wheeler.  Agree with history and plan as detailed   This is a 39 year old man with PMH significant for morbid obesity, ESRD on HD with Dr La, DM2-controlled, HTN, chronic resp. failure on Home O2, hx of substance abuse presenting to the ED with  shortness of breath X 1 day.  There was no CP, no cough, no fevers or other symptoms.  He has been compliant with his HD sessions.    Vital Signs Last 24 Hrs  T(C): 36.7 (14 Oct 2019 01:08), Max: 36.8 (13 Oct 2019 20:53)  T(F): 98 (14 Oct 2019 01:08), Max: 98.3 (13 Oct 2019 20:53)  HR: 80 (14 Oct 2019 01:08) (80 - 83)  BP: 143/72 (14 Oct 2019 01:08) (143/72 - 148/81)  RR: 20 (14 Oct 2019 01:08) (18 - 20)  SpO2: 96% (14 Oct 2019 01:08) (96% - 99%)    Young man, obese++, NAD AAO X 3   Mild pallor anicteric, no JVD  CTA B/L RRR S1S2 only  Full, obese, pitting wall edema over the infraumbilical region; otherwise NTND BS+  +++pitting edema over both LE, no calf tenderness  No focal deficits    Labs                        9.4    7.43  )-----------( 239      ( 13 Oct 2019 22:35 )             32.8     10-13    131<L>  |  93<L>  |  43<H>  ----------------------------<  139<H>  5.7<H>   |  29  |  8.22<H>    Ca    9.9      13 Oct 2019 22:35    TPro  8.0  /  Alb  2.9<L>  /  TBili  0.7  /  DBili  x   /  AST  34  /  ALT  23  /  AlkPhos  234<H>  10-13    pro bnp - [id number]  CARDIAC MARKERS ( 13 Oct 2019 22:35 )  <0.015 ng/mL / x     / x     / x     / x        EKG - no acute changes noted  CXR - appears to show widespread pulm vasc. congestion that looks slightly worse compared to baseline.    ECHO - 09/2019  1. Normal mitral valve. Mild mitral regurgitation. 2. Normal trileaflet aortic valve.  3. Aortic Root: 3.5 cm.4. Normal left atrium.  LA volume index = 22 cc/m2.  5. Mild concentric left ventricular hypertrophy.6. Endocardium not well visualized; grossly normal left ventricular systolic function.  7. Grade III diastolic dysfunction. 8. Normal right atrium. 9. Normal right ventricular size and systolic function  10. RV systolic pressure is mildly increased at  44 mm Hg. 11. There is mild tricuspid regurgitation.  12. There is mild pulmonic regurgitation. 13. Trivial pericardial effusion is seen.    Impression  39 year old man with hx as detailed above presenting with 1 day of persistent SOB and LE edema despite HD suggestive of a fluid overload. There is no evidence of CHF from recent ECHO and this would suggest a primary renal etiology.    A/P  Fluid overload  hyperkalemia  ESRD on HD  Chronic respiratory failure likely from fluid overload, morbid obesity +/- undiagnosed KRYSTIN on home O2  Metabolic alkalosis which appears chronic likely compensatory response to underlying respiratory acidosis    Plan   IV lasix 40mg Q 12 hrly  Monitor weight and I/O daily  Renal diet  HD session scheduled for today; Dr La consulted in the ED  Treat hyperkalemia with diuresis and albuterol; recheck serum chemistry

## 2019-10-15 ENCOUNTER — TRANSCRIPTION ENCOUNTER (OUTPATIENT)
Age: 39
End: 2019-10-15

## 2019-10-15 VITALS
OXYGEN SATURATION: 100 % | HEART RATE: 86 BPM | TEMPERATURE: 98 F | DIASTOLIC BLOOD PRESSURE: 78 MMHG | SYSTOLIC BLOOD PRESSURE: 158 MMHG | RESPIRATION RATE: 18 BRPM

## 2019-10-15 DIAGNOSIS — F39 UNSPECIFIED MOOD [AFFECTIVE] DISORDER: ICD-10-CM

## 2019-10-15 DIAGNOSIS — F14.10 COCAINE ABUSE, UNCOMPLICATED: ICD-10-CM

## 2019-10-15 LAB
GLUCOSE BLDC GLUCOMTR-MCNC: 124 MG/DL — HIGH (ref 70–99)
GLUCOSE BLDC GLUCOMTR-MCNC: 71 MG/DL — SIGNIFICANT CHANGE UP (ref 70–99)
GLUCOSE BLDC GLUCOMTR-MCNC: 71 MG/DL — SIGNIFICANT CHANGE UP (ref 70–99)

## 2019-10-15 PROCEDURE — 36415 COLL VENOUS BLD VENIPUNCTURE: CPT

## 2019-10-15 PROCEDURE — 84484 ASSAY OF TROPONIN QUANT: CPT

## 2019-10-15 PROCEDURE — 99261: CPT

## 2019-10-15 PROCEDURE — 85610 PROTHROMBIN TIME: CPT

## 2019-10-15 PROCEDURE — 93005 ELECTROCARDIOGRAM TRACING: CPT

## 2019-10-15 PROCEDURE — 82962 GLUCOSE BLOOD TEST: CPT

## 2019-10-15 PROCEDURE — 85379 FIBRIN DEGRADATION QUANT: CPT

## 2019-10-15 PROCEDURE — 99285 EMERGENCY DEPT VISIT HI MDM: CPT | Mod: 25

## 2019-10-15 PROCEDURE — 99239 HOSP IP/OBS DSCHRG MGMT >30: CPT

## 2019-10-15 PROCEDURE — 83880 ASSAY OF NATRIURETIC PEPTIDE: CPT

## 2019-10-15 PROCEDURE — 84100 ASSAY OF PHOSPHORUS: CPT

## 2019-10-15 PROCEDURE — 80053 COMPREHEN METABOLIC PANEL: CPT

## 2019-10-15 PROCEDURE — 85730 THROMBOPLASTIN TIME PARTIAL: CPT

## 2019-10-15 PROCEDURE — 71045 X-RAY EXAM CHEST 1 VIEW: CPT

## 2019-10-15 PROCEDURE — 94640 AIRWAY INHALATION TREATMENT: CPT

## 2019-10-15 PROCEDURE — 85027 COMPLETE CBC AUTOMATED: CPT

## 2019-10-15 PROCEDURE — 83735 ASSAY OF MAGNESIUM: CPT

## 2019-10-15 RX ORDER — RISPERIDONE 4 MG/1
1 TABLET ORAL
Qty: 0 | Refills: 0 | DISCHARGE

## 2019-10-15 RX ORDER — CLONAZEPAM 1 MG
0.5 TABLET ORAL EVERY 12 HOURS
Refills: 0 | Status: DISCONTINUED | OUTPATIENT
Start: 2019-10-15 | End: 2019-10-15

## 2019-10-15 RX ORDER — CLONAZEPAM 1 MG
1 TABLET ORAL
Qty: 60 | Refills: 0
Start: 2019-10-15 | End: 2019-11-13

## 2019-10-15 RX ORDER — RISPERIDONE 4 MG/1
1 TABLET ORAL EVERY 12 HOURS
Refills: 0 | Status: DISCONTINUED | OUTPATIENT
Start: 2019-10-15 | End: 2019-10-15

## 2019-10-15 RX ADMIN — OXYCODONE AND ACETAMINOPHEN 2 TABLET(S): 5; 325 TABLET ORAL at 05:14

## 2019-10-15 RX ADMIN — TRAMADOL HYDROCHLORIDE 50 MILLIGRAM(S): 50 TABLET ORAL at 12:35

## 2019-10-15 RX ADMIN — Medication 80 MILLIGRAM(S): at 05:13

## 2019-10-15 RX ADMIN — SEVELAMER CARBONATE 800 MILLIGRAM(S): 2400 POWDER, FOR SUSPENSION ORAL at 17:21

## 2019-10-15 RX ADMIN — Medication 10 MILLIGRAM(S): at 12:35

## 2019-10-15 RX ADMIN — SEVELAMER CARBONATE 800 MILLIGRAM(S): 2400 POWDER, FOR SUSPENSION ORAL at 12:35

## 2019-10-15 RX ADMIN — ALBUTEROL 2 PUFF(S): 90 AEROSOL, METERED ORAL at 17:16

## 2019-10-15 RX ADMIN — SEVELAMER CARBONATE 800 MILLIGRAM(S): 2400 POWDER, FOR SUSPENSION ORAL at 08:17

## 2019-10-15 RX ADMIN — TRAMADOL HYDROCHLORIDE 50 MILLIGRAM(S): 50 TABLET ORAL at 04:00

## 2019-10-15 RX ADMIN — PANTOPRAZOLE SODIUM 40 MILLIGRAM(S): 20 TABLET, DELAYED RELEASE ORAL at 05:13

## 2019-10-15 RX ADMIN — TRAMADOL HYDROCHLORIDE 50 MILLIGRAM(S): 50 TABLET ORAL at 02:46

## 2019-10-15 RX ADMIN — Medication 325 MILLIGRAM(S): at 12:35

## 2019-10-15 RX ADMIN — RISPERIDONE 1 MILLIGRAM(S): 4 TABLET ORAL at 17:21

## 2019-10-15 RX ADMIN — CARVEDILOL PHOSPHATE 6.25 MILLIGRAM(S): 80 CAPSULE, EXTENDED RELEASE ORAL at 17:16

## 2019-10-15 RX ADMIN — Medication 500 MILLIGRAM(S): at 12:35

## 2019-10-15 RX ADMIN — OXYCODONE AND ACETAMINOPHEN 2 TABLET(S): 5; 325 TABLET ORAL at 06:15

## 2019-10-15 RX ADMIN — CHLORHEXIDINE GLUCONATE 1 APPLICATION(S): 213 SOLUTION TOPICAL at 12:37

## 2019-10-15 RX ADMIN — CLOPIDOGREL BISULFATE 75 MILLIGRAM(S): 75 TABLET, FILM COATED ORAL at 12:35

## 2019-10-15 RX ADMIN — TRAMADOL HYDROCHLORIDE 50 MILLIGRAM(S): 50 TABLET ORAL at 13:20

## 2019-10-15 RX ADMIN — ALBUTEROL 2 PUFF(S): 90 AEROSOL, METERED ORAL at 05:13

## 2019-10-15 RX ADMIN — ALBUTEROL 2 PUFF(S): 90 AEROSOL, METERED ORAL at 08:17

## 2019-10-15 RX ADMIN — Medication 0.5 MILLIGRAM(S): at 17:16

## 2019-10-15 RX ADMIN — Medication 1 MILLIGRAM(S): at 12:35

## 2019-10-15 RX ADMIN — HEPARIN SODIUM 5000 UNIT(S): 5000 INJECTION INTRAVENOUS; SUBCUTANEOUS at 05:13

## 2019-10-15 NOTE — BEHAVIORAL HEALTH ASSESSMENT NOTE - RISK ASSESSMENT
Patient with psych history, has been under psych Tx for many years with Dr LAWRENCE Vázquez.  Patient was off his meds for the last 4 months as per patient.  History of 1 psych inpatient Tx at age 16 due to having suicidal thoughts.  Has h/o cutting wrist.Admitted using Cocaine in the past.Stated he is sober for the last 1 month.  Stated living with mother.Supported by [facility].  c/o hearing voices periodically and feeling anxious.  Patient is a/o x3, obese, with multiple tatoos all over his body.  Patient is very articulate, behaviorally controlled.  Speech is goal directed,.Mood-"anxious".Denied having suicidal thoughts at this time.Admitted hearing voices periodically.Denied having command auditory hallucinations.  Memory and cognition-fair.I&J-fair.Sleep and appetite-OK Low Acute Suicide Risk

## 2019-10-15 NOTE — PROGRESS NOTE ADULT - PROBLEM SELECTOR PLAN 3
Pt potassium 5.7 in ED with no EKG changes  10-14    135  |  93<L>  |  46<H>  ----------------------------<  114<H>  5.2   |  33<H>  |  8.46<H>    Ca    9.7      14 Oct 2019 12:51  Phos  5.2     10-14  Mg     2.4     10-14    TPro  8.0  /  Alb  3.0<L>  /  TBili  0.6  /  DBili  x   /  AST  17  /  ALT  21  /  AlkPhos  230<H>  10-14  after dialysis

## 2019-10-15 NOTE — DISCHARGE NOTE NURSING/CASE MANAGEMENT/SOCIAL WORK - PATIENT PORTAL LINK FT
You can access the FollowMyHealth Patient Portal offered by [facility] by registering at the following website: [email or web address]. By joining [employer]’s FollowMyHealth portal, you will also be able to view your health information using other applications (apps) compatible with our system.

## 2019-10-15 NOTE — PROGRESS NOTE ADULT - ATTENDING COMMENTS
Patient was seen at the bedside and discussed with the medical resident, Dr. Bui.     This is a 39 year old man with PMH significant for morbid obesity, ESRD on HD with Dr La, DM2-controlled, HTN, chronic resp. failure on Home O2, hx of substance abuse presenting to the ED with  shortness of breath X 1 day.  There was no CP, no cough, no fevers or other symptoms.  He has been compliant with his HD sessions.  He was dialyzed here.  C/o pain, asks for opiates.    Complained to SW that he had voices telling him to kill himself.  Denied any suicidal intent or ideation to Psychiatry.    Vital Signs Last 24 Hrs  T(C): 36.6 (15 Oct 2019 15:51), Max: 37 (15 Oct 2019 04:55)  T(F): 97.8 (15 Oct 2019 15:51), Max: 98.6 (15 Oct 2019 04:55)  HR: 86 (15 Oct 2019 15:51) (71 - 86)  BP: 158/78 (15 Oct 2019 15:51) (122/74 - 161/92)  BP(mean): --  RR: 18 (15 Oct 2019 15:51) (18 - 20)  SpO2: 100% (15 Oct 2019 15:51) (98% - 100%)  Young man, obese++, NAD AAO X 3    anicteric, no JVD  CTA B/L RRR S1S2 only  Full, obese, pitting wall edema over the infraumbilical region; otherwise NTND BS+  +1 pitting edema  No focal deficits    Labs                        9.0    8.06  )-----------( 250      ( 14 Oct 2019 12:51 )             30.0   10-14    135  |  93<L>  |  46<H>  ----------------------------<  114<H>  5.2   |  33<H>  |  8.46<H>    Ca    9.7      14 Oct 2019 12:51  Phos  5.2     10-14  Mg     2.4     10-14    TPro  8.0  /  Alb  3.0<L>  /  TBili  0.6  /  DBili  x   /  AST  17  /  ALT  21  /  AlkPhos  230<H>  10-14    ECHO - 09/2019  1. Normal mitral valve. Mild mitral regurgitation. 2. Normal trileaflet aortic valve.  3. Aortic Root: 3.5 cm.4. Normal left atrium.  LA volume index = 22 cc/m2.  5. Mild concentric left ventricular hypertrophy.6. Endocardium not well visualized; grossly normal left ventricular systolic function.  7. Grade III diastolic dysfunction. 8. Normal right atrium. 9. Normal right ventricular size and systolic function  10. RV systolic pressure is mildly increased at  44 mm Hg. 11. There is mild tricuspid regurgitation.  12. There is mild pulmonic regurgitation. 13. Trivial pericardial effusion is seen.    Impression  39 year old man with hx as detailed above presenting with 1 day of persistent SOB and LE edema despite HD suggestive of a fluid overload. There is no evidence of CHF from recent ECHO and this would suggest a primary renal etiology.    A/P  Fluid overload  hyperkalemia  ESRD on HD  Chronic respiratory failure likely from fluid overload, morbid obesity +/- undiagnosed KRYSTIN on home O2, at baseline  Metabolic alkalosis which appears chronic likely compensatory response to underlying respiratory acidosis  Pain, anxiety, Psychiatry consultation, appreciated.   Plan  Discharge home.    Oral analgesics x 2 days.   Patient will f/u at his dialysis center tomorrow.

## 2019-10-15 NOTE — PROGRESS NOTE ADULT - REASON FOR ADMISSION
Shortness of breath and leg pain

## 2019-10-15 NOTE — BEHAVIORAL HEALTH ASSESSMENT NOTE - NSBHCHARTREVIEWVS_PSY_A_CORE FT
Vital Signs Last 24 Hrs  T(C): 36.7 (15 Oct 2019 11:05), Max: 37.1 (14 Oct 2019 16:05)  T(F): 98 (15 Oct 2019 11:05), Max: 98.8 (14 Oct 2019 16:05)  HR: 82 (15 Oct 2019 11:05) (71 - 86)  BP: 145/91 (15 Oct 2019 11:05) (122/74 - 181/95)  BP(mean): --  RR: 18 (15 Oct 2019 11:05) (18 - 20)  SpO2: 100% (15 Oct 2019 11:05) (98% - 100%)

## 2019-10-15 NOTE — BEHAVIORAL HEALTH ASSESSMENT NOTE - NSBHCHARTREVIEWIMAGING_PSY_A_CORE FT
EXAM:  XR CHEST AP OR PA 1V                            PROCEDURE DATE:  10/13/2019          INTERPRETATION:  PROCEDURE: AP view of the chest.    CLINICAL INFORMATION: Shortness of breath.    COMPARISON: 10/7/2019.    FINDINGS:     A left axillary vascular stent is noted.    Lungs: There is vascular congestion with increased interstitial markings.  Heart: The heart is top normal in size.  Mediastinum: The mediastinum is within normal limits.    IMPRESSION:    Pulmonary vascular congestion.

## 2019-10-15 NOTE — BEHAVIORAL HEALTH ASSESSMENT NOTE - SUMMARY
Patient with psych history, has been under psych Tx for many years with Dr LAWRENCE Vázquez.  Patient was off his meds for the last 4 months as per patient.  History of 1 psych inpatient Tx at age 16 due to having suicidal thoughts.  Has h/o cutting wrist.Admitted using Cocaine in the past.Stated he is sober for the last 1 month.  Stated living with mother.Supported by [facility].  c/o hearing voices periodically and feeling anxious.  Patient is a/o x3, obese, with multiple tatoos all over his body.  Patient is very articulate, behaviorally controlled.  Speech is goal directed,.Mood-"anxious".Denied having suicidal thoughts at this time.Admitted hearing voices periodically.Denied having command auditory hallucinations.  Memory and cognition-fair.I&J-fair.Sleep and appetite-OK

## 2019-10-15 NOTE — PROGRESS NOTE ADULT - ASSESSMENT
Patient is a 39y Male with ESRD very non-compliant with HD schedule, fluid and salt restriction. multiple admissions for SOB, leg pains and abdominal pains. Presented to ED with dyspnoea and orthopnoea as well as bilateral leg pains. CXR showed pulm congestion but was saturating well on oxygen.  He did not complete sleep study as recommended in the past. renal consulted for emergent HD     A/P:  #ESRD S/P HD yesterday with aggresive UF . next HD in AM   #anemia of CKD  procrit on HD   # HTN BP stable. Cont  coreg  # renal osteodystrophy. cont sevelamer on HD.  D/C planning
Pt is a 39 yr old male with ESRD on dialysis well known to our hospital come sin with shortness of breath with imaging suggestive of pulmonary congestion. Pt is being admitted for dialysis in morning and observation
Patient is a 39y Male with ESRD very non-compliant with HD schedule, fluid and salt restriction. multiple admissions for SOB, leg pains and abdominal pains. Presented to ED with dyspnoea and orthopnoea as well as bilateral leg pains. CXR showed pulm congestion but was saturating well on oxygen.  He did not complete sleep study as recommended in the past. renal consulted for emergent HD     A/P:  #ESRD HD today with UF as tolerated. F/U with pulmonary as outpatient fro sleep study/BIPAP prescription  yesterday .  #anemia of CKD  procrit on HD   # HTN BP stable. Cont  coreg  # renal osteodystrophy. cont sevelamer on HD   # hyperkalemia: non -compliant with dietary potassium restriction. HD today

## 2019-10-15 NOTE — BEHAVIORAL HEALTH ASSESSMENT NOTE - NSBHCHARTREVIEWINVESTIGATE_PSY_A_CORE FT
Ventricular Rate 79 BPM    Atrial Rate 79 BPM    P-R Interval 186 ms    QRS Duration 84 ms    Q-T Interval 378 ms    QTC Calculation(Bezet) 433 ms    P Axis 21 degrees    R Axis -30 degrees    T Axis 42 degrees    Diagnosis Line Normal sinus rhythm  Left axis deviation  Abnormal ECG    Confirmed by RAJIV CRYSTAL, [facility] ([id number]) on 10/14/2019 8:02:32 AM

## 2019-10-15 NOTE — PROGRESS NOTE ADULT - PROBLEM SELECTOR PLAN 5
Pt is on amlodipine, hydralazine, lasix and metoprolol  c/w lasix and metoprolol for now  Vital Signs Last 24 Hrs  T(C): 36.6 (15 Oct 2019 15:51), Max: 37 (15 Oct 2019 04:55)  T(F): 97.8 (15 Oct 2019 15:51), Max: 98.6 (15 Oct 2019 04:55)  HR: 86 (15 Oct 2019 15:51) (71 - 86)  BP: 158/78 (15 Oct 2019 15:51) (122/74 - 161/92)  BP(mean): --  RR: 18 (15 Oct 2019 15:51) (18 - 20)  SpO2: 100% (15 Oct 2019 15:51) (98% - 100%)

## 2019-10-15 NOTE — PROGRESS NOTE ADULT - PROBLEM SELECTOR PLAN 6
Pt has chronic leg pain and underwent workup including Dopplers that was negative  c/w home pain medication regimen, patient will be discharged on percocet x 2 days.  f/u dialysis uniit

## 2019-10-15 NOTE — PROGRESS NOTE ADULT - PROBLEM SELECTOR PLAN 2
Pt is ESRD on Dialysis M/W/F   Last Session on Friday as per Pt  Dialyzed here yesterday, will resume outpatient dialysis tomorrow.   Nephro Dr La

## 2019-10-15 NOTE — PROGRESS NOTE ADULT - SUBJECTIVE AND OBJECTIVE BOX
[facility] : Progress Note :: [phone], (office [phone]),   Dr La / Dr Hui / Dr Barber / Dr Villalobos / Dr Hang CASTELLANO / Dr Mehta / Dr Sanchez / Dr Alber dumont  _____________________________________________________________________________________________    s/p HD yesterday. complains of bilateral leg pains    aspirin (Short breath)  aspirin (Swelling)  penicillin (Short breath)  penicillins (Swelling)  shellfish (Unknown)    Hospital Medications:   MEDICATIONS  (STANDING):  ALBUTerol    90 MICROgram(s) HFA Inhaler 2 Puff(s) Inhalation every 6 hours  ascorbic acid 500 milliGRAM(s) Oral daily  carvedilol 6.25 milliGRAM(s) Oral every 12 hours  chlorhexidine 2% Cloths 1 Application(s) Topical daily  clopidogrel Tablet 75 milliGRAM(s) Oral daily  epoetin cyndie Injectable 4000 Unit(s) IV Push <User Schedule>  ferrous    sulfate 325 milliGRAM(s) Oral daily  folic acid 1 milliGRAM(s) Oral daily  furosemide    Tablet 80 milliGRAM(s) Oral daily  gabapentin 300 milliGRAM(s) Oral at bedtime  heparin  Injectable 5000 Unit(s) SubCutaneous every 12 hours  influenza   Vaccine 0.5 milliLiter(s) IntraMuscular once  insulin lispro (HumaLOG) corrective regimen sliding scale   SubCutaneous three times a day before meals  pantoprazole    Tablet 40 milliGRAM(s) Oral before breakfast  PARoxetine 10 milliGRAM(s) Oral daily  risperiDONE   Tablet 1 milliGRAM(s) Oral at bedtime  sevelamer carbonate 800 milliGRAM(s) Oral three times a day with meals  simvastatin 40 milliGRAM(s) Oral at bedtime      VITALS:  T(F): 98 (10-15-19 @ 11:05), Max: 98.8 (10-14-19 @ 16:05)  HR: 82 (10-15-19 @ 11:05)  BP: 145/91 (10-15-19 @ 11:05)  RR: 18 (10-15-19 @ 11:05)  SpO2: 100% (10-15-19 @ 11:05)  Wt(kg): --    10-14 @ 07:01  -  10-15 @ 07:00  --------------------------------------------------------  IN: 125 mL / OUT: 100 mL / NET: 25 mL    10-15 @ 07:01  -  10-15 @ 14:44  --------------------------------------------------------  IN: 0 mL / OUT: 200 mL / NET: -200 mL        PHYSICAL EXAM:  Constitutional: NAD  HEENT: anicteric sclera, oropharynx clear.  Neck: No JVD  Respiratory: CTAB, bilateral  rales or rhonchi  Cardiovascular: S1, S2, RRR  Gastrointestinal: BS+, soft, NT/ND  Extremities: bilateral  peripheral edema++  Neurological: A/O x 3, no focal deficits  : No CVA tenderness. No hernandez.   Vascular Access: LUEAVF with thrill and bruit    LABS:  10-14    135  |  93<L>  |  46<H>  ----------------------------<  114<H>  5.2   |  33<H>  |  8.46<H>    Ca    9.7      14 Oct 2019 12:51  Phos  5.2     10-14  Mg     2.4     10-14    TPro  8.0  /  Alb  3.0<L>  /  TBili  0.6  /  DBili      /  AST  17  /  ALT  21  /  AlkPhos  230<H>  10-14    Creatinine Trend: 8.46 <--, 8.22 <--                        9.0    8.06  )-----------( 250      ( 14 Oct 2019 12:51 )             30.0     Urine Studies:      RADIOLOGY & ADDITIONAL STUDIES:
MEDICAL ATTENDING NOTE  Patient is a 39y old  Male who presents with a chief complaint of Shortness of breath and leg pain (15 Oct 2019 14:44)      HPI:  Pt is a 39 years old male from home with PMHx of ESRD on MWF HD (via LUE AVF), DM (on insulin), cocaine abuse, COPD on home O2 (4L NC), morbid obesity, Bipolar disorder, schizophrenia and HTN came to ED today for shortness of breath that started yesterday. Pt states to follow dialysis schedule and fluid compliance but yesterday he started having shortness of breath that continued to worsen and her came to ED today. Pt denies any syncope, chest pain, palpitations, belly pain, N/V/D. Pt does report b/l leg pain.    In the ED pt was placed on oxygen inhalation, labs were significant for elevated potassium and BNP. CXR showed worsening congestion. ER Attending spoke with nephrologist and pt is being admitted for observation and dialysis in am (14 Oct 2019 00:48)      INTERVAL HPI/OVERNIGHT EVENTS: Patient had c/o pain, anxiety.  Told SW that he had voices directing him to kill himself.  On meeting with Psychiatrist, he denied.  He told me that he feels better having seen the psychiatrist.  He asks for oral percocet on discharge from the hospital.     MEDICATIONS  (STANDING):  ALBUTerol    90 MICROgram(s) HFA Inhaler 2 Puff(s) Inhalation every 6 hours  ascorbic acid 500 milliGRAM(s) Oral daily  carvedilol 6.25 milliGRAM(s) Oral every 12 hours  chlorhexidine 2% Cloths 1 Application(s) Topical daily  clonazePAM  Tablet 0.5 milliGRAM(s) Oral every 12 hours  clopidogrel Tablet 75 milliGRAM(s) Oral daily  epoetin cyndie Injectable 4000 Unit(s) IV Push <User Schedule>  ferrous    sulfate 325 milliGRAM(s) Oral daily  folic acid 1 milliGRAM(s) Oral daily  furosemide    Tablet 80 milliGRAM(s) Oral daily  gabapentin 300 milliGRAM(s) Oral at bedtime  heparin  Injectable 5000 Unit(s) SubCutaneous every 12 hours  influenza   Vaccine 0.5 milliLiter(s) IntraMuscular once  insulin lispro (HumaLOG) corrective regimen sliding scale   SubCutaneous three times a day before meals  pantoprazole    Tablet 40 milliGRAM(s) Oral before breakfast  PARoxetine 20 milliGRAM(s) Oral at bedtime  risperiDONE   Tablet 1 milliGRAM(s) Oral every 12 hours  sevelamer carbonate 800 milliGRAM(s) Oral three times a day with meals  simvastatin 40 milliGRAM(s) Oral at bedtime    MEDICATIONS  (PRN):  dextrose 40% Gel 15 Gram(s) Oral once PRN Blood Glucose LESS THAN 70 milliGRAM(s)/deciliter  traMADol 50 milliGRAM(s) Oral every 8 hours PRN Severe Pain (7 - 10)      __________________________________________________  REVIEW OF SYSTEMS:    CONSTITUTIONAL: No fever,   EYES: no acute visual disturbances  NECK: No pain or stiffness  RESPIRATORY: No cough; No shortness of breath  CARDIOVASCULAR: No chest pain, no palpitations  GASTROINTESTINAL: No pain. No nausea or vomiting; No diarrhea   NEUROLOGICAL: No headache or numbness, no tremors  MUSCULOSKELETAL: No joint pain, no muscle pain  GENITOURINARY: no dysuria, no frequency, no hesitancy  PSYCHIATRY:  anxiety, see above.   ALL OTHER  ROS negative        Vital Signs Last 24 Hrs  T(C): 36.6 (15 Oct 2019 15:51), Max: 37 (15 Oct 2019 04:55)  T(F): 97.8 (15 Oct 2019 15:51), Max: 98.6 (15 Oct 2019 04:55)  HR: 86 (15 Oct 2019 15:51) (71 - 86)  BP: 158/78 (15 Oct 2019 15:51) (122/74 - 161/92)  BP(mean): --  RR: 18 (15 Oct 2019 15:51) (18 - 20)  SpO2: 100% (15 Oct 2019 15:51) (98% - 100%)    ________________________________________________  PHYSICAL EXAM:  GENERAL: Obese man, recumbent in bed.   HEENT: Normocephalic;  conjunctivae and sclerae clear; moist mucous membranes;   NECK : supple  CHEST/LUNG: Clear to auscultation bilaterally with good air entry   HEART: S1 S2  regular; no murmurs, gallops or rubs  ABDOMEN: Soft, Nontender, Nondistended; Bowel sounds present  EXTREMITIES: no cyanosis; no edema; no calf tenderness  SKIN: warm and dry; no rash  NERVOUS SYSTEM:  Awake and alert; Oriented  to place, person and time ; no new deficits    _________________________________________________  LABS:                        9.0    8.06  )-----------( 250      ( 14 Oct 2019 12:51 )             30.0     10-14    135  |  93<L>  |  46<H>  ----------------------------<  114<H>  5.2   |  33<H>  |  8.46<H>    Ca    9.7      14 Oct 2019 12:51  Phos  5.2     10-14  Mg     2.4     10-14    TPro  8.0  /  Alb  3.0<L>  /  TBili  0.6  /  DBili  x   /  AST  17  /  ALT  21  /  AlkPhos  230<H>  10-14    PT/INR - ( 13 Oct 2019 22:35 )   PT: 11.9 sec;   INR: 1.07 ratio         PTT - ( 13 Oct 2019 22:35 )  PTT:24.6 sec    CAPILLARY BLOOD GLUCOSE      POCT Blood Glucose.: 71 mg/dL (15 Oct 2019 11:42)  POCT Blood Glucose.: 71 mg/dL (15 Oct 2019 08:02)  POCT Blood Glucose.: 131 mg/dL (14 Oct 2019 21:09)  POCT Blood Glucose.: 137 mg/dL (14 Oct 2019 17:21)
[facility] : Progress Note :: [phone], (office [phone]),   Dr La / Dr Hui / Dr Barber / Dr Villalobos / Dr Hang CASTELLANO / Dr Mehta / Dr Sanchez / Dr Alber dumont  _____________________________________________________________________________________________  Patient is a 39y Male with ESRD very non-compliant with HD schedule, fluid and salt restriction. multiple admissions for SOB, leg pains and abdominal pains. Presented to ED with dyspnoea and orthopnoea as well as bilateral leg pains. CXR showed pulm congestion but was saturating well on oxygen.  He did not complete sleep study as recommended in the past. renal consulted for emergent HD     aspirin (Short breath)  aspirin (Swelling)  penicillin (Short breath)  penicillins (Swelling)  shellfish (Unknown)    Hospital Medications:   MEDICATIONS  (STANDING):  ALBUTerol    90 MICROgram(s) HFA Inhaler 2 Puff(s) Inhalation every 6 hours  ascorbic acid 500 milliGRAM(s) Oral daily  carvedilol 6.25 milliGRAM(s) Oral every 12 hours  chlorhexidine 2% Cloths 1 Application(s) Topical daily  clopidogrel Tablet 75 milliGRAM(s) Oral daily  epoetin cyndie Injectable 4000 Unit(s) IV Push <User Schedule>  ferrous    sulfate 325 milliGRAM(s) Oral daily  folic acid 1 milliGRAM(s) Oral daily  furosemide    Tablet 80 milliGRAM(s) Oral daily  gabapentin 300 milliGRAM(s) Oral at bedtime  heparin  Injectable 5000 Unit(s) SubCutaneous every 12 hours  influenza   Vaccine 0.5 milliLiter(s) IntraMuscular once  insulin lispro (HumaLOG) corrective regimen sliding scale   SubCutaneous three times a day before meals  pantoprazole    Tablet 40 milliGRAM(s) Oral before breakfast  PARoxetine 10 milliGRAM(s) Oral daily  risperiDONE   Tablet 1 milliGRAM(s) Oral at bedtime  sevelamer carbonate 800 milliGRAM(s) Oral three times a day with meals  simvastatin 40 milliGRAM(s) Oral at bedtime      All other review of systems is negative unless indicated above.    VITALS:  T(F): 98.4 (10-14-19 @ 12:35), Max: 99.4 (10-14-19 @ 11:20)  HR: 84 (10-14-19 @ 12:35)  BP: 151/95 (10-14-19 @ 12:35)  RR: 20 (10-14-19 @ 12:35)  SpO2: 98% (10-14-19 @ 11:20)  Wt(kg): --    10-14 @ 07:01  -  10-14 @ 15:59  --------------------------------------------------------  IN: 0 mL / OUT: 100 mL / NET: -100 mL      Height (cm): 175.26 (10-13 @ 20:53)  Weight (kg): 145.1 (10-13 @ 20:53)  BMI (kg/m2): 47.2 (10-13 @ 20:53)  BSA (m2): 2.52 (10-13 @ 20:53)    PHYSICAL EXAM:  Constitutional: NAD  HEENT: anicteric sclera, oropharynx clear.  Neck: No JVD  Respiratory: CTAB, no wheezes, rales or rhonchi  Cardiovascular: S1, S2, RRR  Gastrointestinal: BS+, soft, NT/ND  Extremities: No cyanosis or clubbing. No peripheral edema  Neurological: A/O x 3, no focal deficits  Psychiatric: Normal mood, normal affect  Vascular Access: AVF cannulated    LABS:  10-14    135  |  93<L>  |  46<H>  ----------------------------<  114<H>  5.2   |  33<H>  |  8.46<H>    Ca    9.7      14 Oct 2019 12:51  Phos  5.2     10-  Mg     2.4     10-14    TPro  8.0  /  Alb  3.0<L>  /  TBili  0.6  /  DBili      /  AST  17  /  ALT  21  /  AlkPhos  230<H>  10-14    Creatinine Trend: 8.46 <--, 8.22 <--, 6.32 <--                        9.0    8.06  )-----------( 250      ( 14 Oct 2019 12:51 )             30.0     Urine Studies:  Urinalysis Basic - ( 07 Oct 2019 18:04 )    Color: Yellow / Appearance: Clear / S.010 / pH:   Gluc:  / Ketone: Negative  / Bili: Negative / Urobili: Negative   Blood:  / Protein: 500 mg/dL / Nitrite: Negative   Leuk Esterase: Trace / RBC: 0-2 /HPF / WBC 11-25 /HPF   Sq Epi:  / Non Sq Epi: Moderate /HPF / Bacteria: Trace /HPF        RADIOLOGY & ADDITIONAL STUDIES:
Patient was examined in the ED and discussed with resident.     He c/o pain in both legs.     Vital Signs Last 24 Hrs  T(C): 36.8 (14 Oct 2019 17:50), Max: 37.4 (14 Oct 2019 11:20)  T(F): 98.2 (14 Oct 2019 17:50), Max: 99.4 (14 Oct 2019 11:20)  HR: 86 (14 Oct 2019 16:05) (80 - 87)  BP: 122/74 (14 Oct 2019 17:50) (122/74 - 181/95)  BP(mean): --  RR: 20 (14 Oct 2019 17:50) (18 - 20)  SpO2: 98% (14 Oct 2019 11:20) (96% - 100%)    Patient has had management consultation during a previous admission:    " Problem/Recommendation - 1:  Problem: Pain in both lower legs. Recommendation: - gabapentin 300mg po q hs  - tramadol 50mg po q  6 hours prn  - Robaxin po prn  - no nsaids  - no iv opioids  - pt to continue HD as needed  - stool softeners."    IMP:  Patient is having subjective pain.  No cellulitis is evident. (history of scrotal cellulitis).           SOB, patient has had a partial w/u for sleep apnea.  He did not keep return appointment.           Polysubstance use:  recent positive urine for cocaine and patient has requested opioids for pain.           ESRD, on dialysis          Morbid obesity          Possible sleep apnea  Plan: Continue medications.  NO PARENTERAL OPIOIDS.

## 2019-10-15 NOTE — BEHAVIORAL HEALTH ASSESSMENT NOTE - NSBHCHARTREVIEWLAB_PSY_A_CORE FT
CBC Full  -  ( 14 Oct 2019 12:51 )  WBC Count : 8.06 K/uL  RBC Count : 3.28 M/uL  Hemoglobin : 9.0 g/dL  Hematocrit : 30.0 %  Platelet Count - Automated : 250 K/uL  Mean Cell Volume : 91.5 fl  Mean Cell Hemoglobin : 27.4 pg  Mean Cell Hemoglobin Concentration : 30.0 gm/dL  Auto Neutrophil # : x  Auto Lymphocyte # : x  Auto Monocyte # : x  Auto Eosinophil # : x  Auto Basophil # : x  Auto Neutrophil % : x  Auto Lymphocyte % : x  Auto Monocyte % : x  Auto Eosinophil % : x  Auto Basophil % : x

## 2019-10-15 NOTE — BEHAVIORAL HEALTH ASSESSMENT NOTE - HPI (INCLUDE ILLNESS QUALITY, SEVERITY, DURATION, TIMING, CONTEXT, MODIFYING FACTORS, ASSOCIATED SIGNS AND SYMPTOMS)
HPI:  Pt is a 39 years old male from home with PMHx of ESRD on MWF HD (via LUE AVF), DM (on insulin), cocaine abuse, COPD on home O2 (4L NC), morbid obesity, Bipolar disorder, schizophrenia and HTN came to ED today for shortness of breath that started yesterday. Pt states to follow dialysis schedule and fluid compliance but yesterday he started having shortness of breath that continued to worsen and her came to ED today. Pt denies any syncope, chest pain, palpitations, belly pain, N/V/D. Pt does report b/l leg pain.    In the ED pt was placed on oxygen inhalation, labs were significant for elevated potassium and BNP. CXR showed worsening congestion. ER Attending spoke with nephrologist and pt is being admitted for observation and dialysis in am (14 Oct 2019 00:48)  Patient with psych history, has been under psych Tx for many years with Dr LAWRENCE Vázquez.  Patient was off his meds for the last 4 months as per patient.  History of 1 psych inpatient Tx at age 16 due to having suicidal thoughts.  Has h/o cutting wrist.Admitted using Cocaine in the past.Stated he is sober for the last 1 month.  Stated linig with mother.Supported by SSI.  c/o hearing voices periodically and feeling anxious.  Patient is a/o x3, obese, with multiple tatoos all over his body.  Patient is very articulate, behaviorally controlled.  Speech is goal directed,.Mood-"anxious".Denied having suicidal thoughts at this time.Admitted hearing voices periodically.Denied having command auditory hallucinations.  Memory and cognition-fair.I&J-fair.Sleep and appetite-OK

## 2019-10-15 NOTE — CONSULT NOTE ADULT - SUBJECTIVE AND OBJECTIVE BOX
Chief Complaint:    HPI:  Pt is a 39 years old male from home with PMHx of ESRD on MWF HD (via LUE AVF), DM (on insulin), cocaine abuse, COPD on home O2 (4L NC), morbid obesity, Bipolar disorder, schizophrenia and HTN came to ED today for shortness of breath that started yesterday. Pt states to follow dialysis schedule and fluid compliance but yesterday he started having shortness of breath that continued to worsen and her came to ED today. Pt denies any syncope, chest pain, palpitations, belly pain, N/V/D. Pt does report b/l leg pain.    In the ED pt was placed on oxygen inhalation, labs were significant for elevated potassium and BNP. CXR showed worsening congestion. ER Attending spoke with nephrologist and pt is being admitted for observation and dialysis in am (14 Oct 2019 00:48)      Pain Level:   Scale: VAS    PAST MEDICAL & SURGICAL HISTORY:  COPD (chronic obstructive pulmonary disease)  Migraine  HTN (hypertension)  DM (diabetes mellitus)  Bipolar disorder  Schizophrenia  ESRD on dialysis  Morbid obesity with BMI of 40.0-44.9, adult  H/O hernia repair      FAMILY HISTORY:  Family history of COPD (chronic obstructive pulmonary disease)  Family history of diabetes mellitus      SOCIAL HISTORY:  [ ] Denies Smoking, Alcohol, or Drug Use    Allergies    aspirin (Short breath)  aspirin (Swelling)  penicillin (Short breath)  penicillins (Swelling)  shellfish (Unknown)    Intolerances        PAIN MEDICATIONS:  clonazePAM  Tablet 0.5 milliGRAM(s) Oral every 12 hours  gabapentin 300 milliGRAM(s) Oral at bedtime  PARoxetine 20 milliGRAM(s) Oral at bedtime  risperiDONE   Tablet 1 milliGRAM(s) Oral every 12 hours  traMADol 50 milliGRAM(s) Oral every 8 hours PRN      Heme:  clopidogrel Tablet 75 milliGRAM(s) Oral daily  heparin  Injectable 5000 Unit(s) SubCutaneous every 12 hours    Antibiotics:    Cardiovascular:  carvedilol 6.25 milliGRAM(s) Oral every 12 hours  furosemide    Tablet 80 milliGRAM(s) Oral daily    GI:  pantoprazole    Tablet 40 milliGRAM(s) Oral before breakfast    Endocrine:  dextrose 40% Gel 15 Gram(s) Oral once PRN  insulin lispro (HumaLOG) corrective regimen sliding scale   SubCutaneous three times a day before meals  simvastatin 40 milliGRAM(s) Oral at bedtime    All Other Medications:  ascorbic acid 500 milliGRAM(s) Oral daily  chlorhexidine 2% Cloths 1 Application(s) Topical daily  epoetin cyndie Injectable 4000 Unit(s) IV Push <User Schedule>  ferrous    sulfate 325 milliGRAM(s) Oral daily  folic acid 1 milliGRAM(s) Oral daily  influenza   Vaccine 0.5 milliLiter(s) IntraMuscular once  sevelamer carbonate 800 milliGRAM(s) Oral three times a day with meals      REVIEW OF SYSTEMS:    CONSTITUTIONAL: No fever, weight loss, or fatigue  RESPIRATORY: No cough, wheezing, chills, or hemoptysis, No SOB  NECK: No neck pain  CARDIOVASCULAR: No chest pain, palpitations, dizziness, or leg swelling  GASTROINTESTINAL: No abdominal or epigastric pain.  No nausea, vomiting, or hematemesis.  No diarrhea. + constipation.  GENITOURINARY: No dysuria, frequency, hematuria or incontinence  NEUROLOGICAL: No headaches, memory loss, loss of strength, numbness or tremors  MUSCULOSKELETAL: No joint pain or swelling, No muscle or back pain    Vital Signs Last 24 Hrs  T(C): 36.6 (15 Oct 2019 15:51), Max: 37 (15 Oct 2019 04:55)  T(F): 97.8 (15 Oct 2019 15:51), Max: 98.6 (15 Oct 2019 04:55)  HR: 86 (15 Oct 2019 15:51) (71 - 86)  BP: 158/78 (15 Oct 2019 15:51) (127/79 - 161/92)  BP(mean): --  RR: 18 (15 Oct 2019 15:51) (18 - 20)  SpO2: 100% (15 Oct 2019 15:51) (98% - 100%)    PAIN SCORE:         SCALE USED: (1-10 VNRS)    PHYSICAL EXAM:    GENERAL: NAD, well-groomed, well-developed   NERVOUS SYSTEM: Alert and oriented x3, Motor strength 5/5 B/L upper and lower extremities, SLR -   CHEST/LUNG: Clear to percussion bilaterally, no rale, rhonchi or wheezing  HEART: Regular rate and rhythm, No murmurs, rubs, or gallops   ABDOMEN: Soft, nontender, nondistended, Bowel sounds present  BACK: No CVA tenderness, No paravetebral tenderness  EXTREMITIES: +2 periperal extremities, no edema, cyanosis + decreased rom     LABS:                          9.0    8.06  )-----------( 250      ( 14 Oct 2019 12:51 )             30.0     10-14    135  |  93<L>  |  46<H>  ----------------------------<  114<H>  5.2   |  33<H>  |  8.46<H>    Ca    9.7      14 Oct 2019 12:51  Phos  5.2     10-14  Mg     2.4     10-14    TPro  8.0  /  Alb  3.0<L>  /  TBili  0.6  /  DBili  x   /  AST  17  /  ALT  21  /  AlkPhos  230<H>  10-14    PT/INR - ( 13 Oct 2019 22:35 )   PT: 11.9 sec;   INR: 1.07 ratio         PTT - ( 13 Oct 2019 22:35 )  PTT:24.6 sec      RADIOLOGY:    Drug Screen:        RECOMMENDATIONS    [ ]  [place]  Reviewed and Copied to Chart

## 2019-10-16 ENCOUNTER — APPOINTMENT (OUTPATIENT)
Dept: VASCULAR SURGERY | Facility: CLINIC | Age: 39
End: 2019-10-16

## 2019-10-22 ENCOUNTER — APPOINTMENT (OUTPATIENT)
Dept: VASCULAR SURGERY | Facility: CLINIC | Age: 39
End: 2019-10-22

## 2019-10-24 ENCOUNTER — INPATIENT (INPATIENT)
Facility: HOSPITAL | Age: 39
LOS: 0 days | Discharge: HOME | DRG: 189 | End: 2019-10-25
Attending: STUDENT IN AN ORGANIZED HEALTH CARE EDUCATION/TRAINING PROGRAM | Admitting: STUDENT IN AN ORGANIZED HEALTH CARE EDUCATION/TRAINING PROGRAM
Payer: MEDICARE

## 2019-10-24 VITALS
HEART RATE: 98 BPM | WEIGHT: 315 LBS | DIASTOLIC BLOOD PRESSURE: 83 MMHG | RESPIRATION RATE: 18 BRPM | SYSTOLIC BLOOD PRESSURE: 149 MMHG | OXYGEN SATURATION: 96 % | TEMPERATURE: 98 F

## 2019-10-24 DIAGNOSIS — J44.9 CHRONIC OBSTRUCTIVE PULMONARY DISEASE, UNSPECIFIED: ICD-10-CM

## 2019-10-24 DIAGNOSIS — Z98.890 OTHER SPECIFIED POSTPROCEDURAL STATES: Chronic | ICD-10-CM

## 2019-10-24 LAB
ALBUMIN SERPL ELPH-MCNC: 2.9 G/DL — LOW (ref 3.5–5)
ALP SERPL-CCNC: 232 U/L — HIGH (ref 40–120)
ALT FLD-CCNC: 20 U/L DA — SIGNIFICANT CHANGE UP (ref 10–60)
ANION GAP SERPL CALC-SCNC: 8 MMOL/L — SIGNIFICANT CHANGE UP (ref 5–17)
AST SERPL-CCNC: 17 U/L — SIGNIFICANT CHANGE UP (ref 10–40)
BASE EXCESS BLDV CALC-SCNC: 6.5 MMOL/L — HIGH (ref -2–2)
BASOPHILS # BLD AUTO: 0.04 K/UL — SIGNIFICANT CHANGE UP (ref 0–0.2)
BASOPHILS NFR BLD AUTO: 0.5 % — SIGNIFICANT CHANGE UP (ref 0–2)
BILIRUB SERPL-MCNC: 0.5 MG/DL — SIGNIFICANT CHANGE UP (ref 0.2–1.2)
BLOOD GAS COMMENTS, VENOUS: SIGNIFICANT CHANGE UP
BUN SERPL-MCNC: 46 MG/DL — HIGH (ref 7–18)
CALCIUM SERPL-MCNC: 10.7 MG/DL — HIGH (ref 8.4–10.5)
CHLORIDE SERPL-SCNC: 97 MMOL/L — SIGNIFICANT CHANGE UP (ref 96–108)
CO2 SERPL-SCNC: 32 MMOL/L — HIGH (ref 22–31)
CREAT SERPL-MCNC: 7.06 MG/DL — HIGH (ref 0.5–1.3)
EOSINOPHIL # BLD AUTO: 0.5 K/UL — SIGNIFICANT CHANGE UP (ref 0–0.5)
EOSINOPHIL NFR BLD AUTO: 5.7 % — SIGNIFICANT CHANGE UP (ref 0–6)
GLUCOSE SERPL-MCNC: 132 MG/DL — HIGH (ref 70–99)
HCO3 BLDV-SCNC: 33 MMOL/L — HIGH (ref 21–29)
HCT VFR BLD CALC: 30.6 % — LOW (ref 39–50)
HGB BLD-MCNC: 9 G/DL — LOW (ref 13–17)
HOROWITZ INDEX BLDV+IHG-RTO: 21 — SIGNIFICANT CHANGE UP
IMM GRANULOCYTES NFR BLD AUTO: 0.5 % — SIGNIFICANT CHANGE UP (ref 0–1.5)
LYMPHOCYTES # BLD AUTO: 0.92 K/UL — LOW (ref 1–3.3)
LYMPHOCYTES # BLD AUTO: 10.5 % — LOW (ref 13–44)
MCHC RBC-ENTMCNC: 27.4 PG — SIGNIFICANT CHANGE UP (ref 27–34)
MCHC RBC-ENTMCNC: 29.4 GM/DL — LOW (ref 32–36)
MCV RBC AUTO: 93.3 FL — SIGNIFICANT CHANGE UP (ref 80–100)
MONOCYTES # BLD AUTO: 1.21 K/UL — HIGH (ref 0–0.9)
MONOCYTES NFR BLD AUTO: 13.8 % — SIGNIFICANT CHANGE UP (ref 2–14)
NEUTROPHILS # BLD AUTO: 6.04 K/UL — SIGNIFICANT CHANGE UP (ref 1.8–7.4)
NEUTROPHILS NFR BLD AUTO: 69 % — SIGNIFICANT CHANGE UP (ref 43–77)
NRBC # BLD: 0 /100 WBCS — SIGNIFICANT CHANGE UP (ref 0–0)
NT-PROBNP SERPL-SCNC: HIGH PG/ML (ref 0–125)
PCO2 BLDV: 60 MMHG — HIGH (ref 35–50)
PH BLDV: 7.36 — SIGNIFICANT CHANGE UP (ref 7.35–7.45)
PLATELET # BLD AUTO: 281 K/UL — SIGNIFICANT CHANGE UP (ref 150–400)
PO2 BLDV: 126 MMHG — HIGH (ref 25–45)
POTASSIUM SERPL-MCNC: 4.5 MMOL/L — SIGNIFICANT CHANGE UP (ref 3.5–5.3)
POTASSIUM SERPL-SCNC: 4.5 MMOL/L — SIGNIFICANT CHANGE UP (ref 3.5–5.3)
PROT SERPL-MCNC: 7.6 G/DL — SIGNIFICANT CHANGE UP (ref 6–8.3)
RBC # BLD: 3.28 M/UL — LOW (ref 4.2–5.8)
RBC # FLD: 18.4 % — HIGH (ref 10.3–14.5)
SAO2 % BLDV: 98 % — HIGH (ref 67–88)
SODIUM SERPL-SCNC: 137 MMOL/L — SIGNIFICANT CHANGE UP (ref 135–145)
WBC # BLD: 8.75 K/UL — SIGNIFICANT CHANGE UP (ref 3.8–10.5)
WBC # FLD AUTO: 8.75 K/UL — SIGNIFICANT CHANGE UP (ref 3.8–10.5)

## 2019-10-24 PROCEDURE — 93010 ELECTROCARDIOGRAM REPORT: CPT

## 2019-10-24 PROCEDURE — 71045 X-RAY EXAM CHEST 1 VIEW: CPT | Mod: 26

## 2019-10-24 PROCEDURE — 99285 EMERGENCY DEPT VISIT HI MDM: CPT

## 2019-10-24 PROCEDURE — 99222 1ST HOSP IP/OBS MODERATE 55: CPT

## 2019-10-24 NOTE — ED PROVIDER NOTE - CARE PLAN
Principal Discharge DX:	COPD (chronic obstructive pulmonary disease)  Secondary Diagnosis:	ESRD on dialysis  Secondary Diagnosis:	Fluid overload

## 2019-10-24 NOTE — ED PROVIDER NOTE - CLINICAL SUMMARY MEDICAL DECISION MAKING FREE TEXT BOX
patient with LE swelling and shortness of breath, xray reveals some fluid overload on CXR. patient noted to desat to high 80s when sleeping on nasal canula. COPD vs obesity hypoventilation syndrome. might benefit from bipap for sleep. patient denies being on bipap/cpap for sleep.

## 2019-10-24 NOTE — ED ADULT NURSE NOTE - ED STAT RN HANDOFF DETAILS
endorsed to jorge luis SANCHEZ admitted to [address] [place] [address], stable, safety maintained

## 2019-10-24 NOTE — ED ADULT NURSE NOTE - OBJECTIVE STATEMENT
patient presents to the ED c/o mild sob and bilateral leg pain,  with swelling to both legs today , pt on oxygen 4L at home  NC. HD yesterday, hemodialysis catheter on left arm.

## 2019-10-25 ENCOUNTER — TRANSCRIPTION ENCOUNTER (OUTPATIENT)
Age: 39
End: 2019-10-25

## 2019-10-25 VITALS
DIASTOLIC BLOOD PRESSURE: 96 MMHG | SYSTOLIC BLOOD PRESSURE: 151 MMHG | HEART RATE: 97 BPM | RESPIRATION RATE: 20 BRPM | OXYGEN SATURATION: 100 %

## 2019-10-25 DIAGNOSIS — N18.6 END STAGE RENAL DISEASE: ICD-10-CM

## 2019-10-25 DIAGNOSIS — R09.89 OTHER SPECIFIED SYMPTOMS AND SIGNS INVOLVING THE CIRCULATORY AND RESPIRATORY SYSTEMS: ICD-10-CM

## 2019-10-25 DIAGNOSIS — J96.11 CHRONIC RESPIRATORY FAILURE WITH HYPOXIA: ICD-10-CM

## 2019-10-25 DIAGNOSIS — Z29.9 ENCOUNTER FOR PROPHYLACTIC MEASURES, UNSPECIFIED: ICD-10-CM

## 2019-10-25 DIAGNOSIS — E11.9 TYPE 2 DIABETES MELLITUS WITHOUT COMPLICATIONS: ICD-10-CM

## 2019-10-25 DIAGNOSIS — I10 ESSENTIAL (PRIMARY) HYPERTENSION: ICD-10-CM

## 2019-10-25 LAB
ALBUMIN SERPL ELPH-MCNC: 2.7 G/DL — LOW (ref 3.5–5)
ALP SERPL-CCNC: 221 U/L — HIGH (ref 40–120)
ALT FLD-CCNC: 18 U/L DA — SIGNIFICANT CHANGE UP (ref 10–60)
ANION GAP SERPL CALC-SCNC: 10 MMOL/L — SIGNIFICANT CHANGE UP (ref 5–17)
AST SERPL-CCNC: 13 U/L — SIGNIFICANT CHANGE UP (ref 10–40)
BASOPHILS # BLD AUTO: 0.03 K/UL — SIGNIFICANT CHANGE UP (ref 0–0.2)
BASOPHILS NFR BLD AUTO: 0.5 % — SIGNIFICANT CHANGE UP (ref 0–2)
BILIRUB SERPL-MCNC: 0.5 MG/DL — SIGNIFICANT CHANGE UP (ref 0.2–1.2)
BUN SERPL-MCNC: 52 MG/DL — HIGH (ref 7–18)
CALCIUM SERPL-MCNC: 9.7 MG/DL — SIGNIFICANT CHANGE UP (ref 8.4–10.5)
CHLORIDE SERPL-SCNC: 95 MMOL/L — LOW (ref 96–108)
CHOLEST SERPL-MCNC: 144 MG/DL — SIGNIFICANT CHANGE UP (ref 10–199)
CO2 SERPL-SCNC: 32 MMOL/L — HIGH (ref 22–31)
CREAT SERPL-MCNC: 7.96 MG/DL — HIGH (ref 0.5–1.3)
EOSINOPHIL # BLD AUTO: 0.48 K/UL — SIGNIFICANT CHANGE UP (ref 0–0.5)
EOSINOPHIL NFR BLD AUTO: 7.2 % — HIGH (ref 0–6)
GLUCOSE BLDC GLUCOMTR-MCNC: 110 MG/DL — HIGH (ref 70–99)
GLUCOSE BLDC GLUCOMTR-MCNC: 126 MG/DL — HIGH (ref 70–99)
GLUCOSE BLDC GLUCOMTR-MCNC: 161 MG/DL — HIGH (ref 70–99)
GLUCOSE SERPL-MCNC: 133 MG/DL — HIGH (ref 70–99)
HBA1C BLD-MCNC: 5.3 % — SIGNIFICANT CHANGE UP (ref 4–5.6)
HCT VFR BLD CALC: 30.1 % — LOW (ref 39–50)
HDLC SERPL-MCNC: 54 MG/DL — SIGNIFICANT CHANGE UP
HGB BLD-MCNC: 8.9 G/DL — LOW (ref 13–17)
IMM GRANULOCYTES NFR BLD AUTO: 0.2 % — SIGNIFICANT CHANGE UP (ref 0–1.5)
LIPID PNL WITH DIRECT LDL SERPL: 74 MG/DL — SIGNIFICANT CHANGE UP
LYMPHOCYTES # BLD AUTO: 0.7 K/UL — LOW (ref 1–3.3)
LYMPHOCYTES # BLD AUTO: 10.5 % — LOW (ref 13–44)
MAGNESIUM SERPL-MCNC: 2.4 MG/DL — SIGNIFICANT CHANGE UP (ref 1.6–2.6)
MCHC RBC-ENTMCNC: 27.7 PG — SIGNIFICANT CHANGE UP (ref 27–34)
MCHC RBC-ENTMCNC: 29.6 GM/DL — LOW (ref 32–36)
MCV RBC AUTO: 93.8 FL — SIGNIFICANT CHANGE UP (ref 80–100)
MONOCYTES # BLD AUTO: 0.95 K/UL — HIGH (ref 0–0.9)
MONOCYTES NFR BLD AUTO: 14.3 % — HIGH (ref 2–14)
NEUTROPHILS # BLD AUTO: 4.49 K/UL — SIGNIFICANT CHANGE UP (ref 1.8–7.4)
NEUTROPHILS NFR BLD AUTO: 67.3 % — SIGNIFICANT CHANGE UP (ref 43–77)
NRBC # BLD: 0 /100 WBCS — SIGNIFICANT CHANGE UP (ref 0–0)
PHOSPHATE SERPL-MCNC: 6.4 MG/DL — HIGH (ref 2.5–4.5)
PLATELET # BLD AUTO: 256 K/UL — SIGNIFICANT CHANGE UP (ref 150–400)
POTASSIUM SERPL-MCNC: 5 MMOL/L — SIGNIFICANT CHANGE UP (ref 3.5–5.3)
POTASSIUM SERPL-SCNC: 5 MMOL/L — SIGNIFICANT CHANGE UP (ref 3.5–5.3)
PROT SERPL-MCNC: 7.5 G/DL — SIGNIFICANT CHANGE UP (ref 6–8.3)
RBC # BLD: 3.21 M/UL — LOW (ref 4.2–5.8)
RBC # FLD: 18.6 % — HIGH (ref 10.3–14.5)
SODIUM SERPL-SCNC: 137 MMOL/L — SIGNIFICANT CHANGE UP (ref 135–145)
TOTAL CHOLESTEROL/HDL RATIO MEASUREMENT: 2.7 RATIO — LOW (ref 3.4–9.6)
TRIGL SERPL-MCNC: 78 MG/DL — SIGNIFICANT CHANGE UP (ref 10–149)
TSH SERPL-MCNC: 3.03 UU/ML — SIGNIFICANT CHANGE UP (ref 0.34–4.82)
VIT B12 SERPL-MCNC: 903 PG/ML — SIGNIFICANT CHANGE UP (ref 232–1245)
WBC # BLD: 6.66 K/UL — SIGNIFICANT CHANGE UP (ref 3.8–10.5)
WBC # FLD AUTO: 6.66 K/UL — SIGNIFICANT CHANGE UP (ref 3.8–10.5)

## 2019-10-25 PROCEDURE — 93970 EXTREMITY STUDY: CPT | Mod: 26

## 2019-10-25 PROCEDURE — 99239 HOSP IP/OBS DSCHRG MGMT >30: CPT

## 2019-10-25 RX ORDER — METHOCARBAMOL 500 MG/1
500 TABLET, FILM COATED ORAL EVERY 12 HOURS
Refills: 0 | Status: DISCONTINUED | OUTPATIENT
Start: 2019-10-25 | End: 2019-10-25

## 2019-10-25 RX ORDER — PANTOPRAZOLE SODIUM 20 MG/1
40 TABLET, DELAYED RELEASE ORAL
Refills: 0 | Status: DISCONTINUED | OUTPATIENT
Start: 2019-10-25 | End: 2019-10-25

## 2019-10-25 RX ORDER — FOLIC ACID 0.8 MG
1 TABLET ORAL DAILY
Refills: 0 | Status: DISCONTINUED | OUTPATIENT
Start: 2019-10-25 | End: 2019-10-25

## 2019-10-25 RX ORDER — SIMVASTATIN 20 MG/1
40 TABLET, FILM COATED ORAL AT BEDTIME
Refills: 0 | Status: DISCONTINUED | OUTPATIENT
Start: 2019-10-25 | End: 2019-10-25

## 2019-10-25 RX ORDER — OXYCODONE AND ACETAMINOPHEN 5; 325 MG/1; MG/1
1 TABLET ORAL EVERY 4 HOURS
Refills: 0 | Status: DISCONTINUED | OUTPATIENT
Start: 2019-10-25 | End: 2019-10-25

## 2019-10-25 RX ORDER — CARVEDILOL PHOSPHATE 80 MG/1
6.25 CAPSULE, EXTENDED RELEASE ORAL EVERY 12 HOURS
Refills: 0 | Status: DISCONTINUED | OUTPATIENT
Start: 2019-10-25 | End: 2019-10-25

## 2019-10-25 RX ORDER — ASCORBIC ACID 60 MG
500 TABLET,CHEWABLE ORAL DAILY
Refills: 0 | Status: DISCONTINUED | OUTPATIENT
Start: 2019-10-25 | End: 2019-10-25

## 2019-10-25 RX ORDER — MIRTAZAPINE 45 MG/1
7.5 TABLET, ORALLY DISINTEGRATING ORAL AT BEDTIME
Refills: 0 | Status: DISCONTINUED | OUTPATIENT
Start: 2019-10-25 | End: 2019-10-25

## 2019-10-25 RX ORDER — GABAPENTIN 400 MG/1
300 CAPSULE ORAL AT BEDTIME
Refills: 0 | Status: DISCONTINUED | OUTPATIENT
Start: 2019-10-25 | End: 2019-10-25

## 2019-10-25 RX ORDER — FUROSEMIDE 40 MG
80 TABLET ORAL DAILY
Refills: 0 | Status: DISCONTINUED | OUTPATIENT
Start: 2019-10-25 | End: 2019-10-25

## 2019-10-25 RX ORDER — ALBUTEROL 90 UG/1
2 AEROSOL, METERED ORAL EVERY 6 HOURS
Refills: 0 | Status: DISCONTINUED | OUTPATIENT
Start: 2019-10-25 | End: 2019-10-25

## 2019-10-25 RX ORDER — CLONAZEPAM 1 MG
0.5 TABLET ORAL EVERY 12 HOURS
Refills: 0 | Status: DISCONTINUED | OUTPATIENT
Start: 2019-10-25 | End: 2019-10-25

## 2019-10-25 RX ORDER — RISPERIDONE 4 MG/1
1 TABLET ORAL EVERY 12 HOURS
Refills: 0 | Status: DISCONTINUED | OUTPATIENT
Start: 2019-10-25 | End: 2019-10-25

## 2019-10-25 RX ORDER — AMLODIPINE BESYLATE 2.5 MG/1
2.5 TABLET ORAL DAILY
Refills: 0 | Status: DISCONTINUED | OUTPATIENT
Start: 2019-10-25 | End: 2019-10-25

## 2019-10-25 RX ORDER — CHLORHEXIDINE GLUCONATE 213 G/1000ML
1 SOLUTION TOPICAL
Refills: 0 | Status: DISCONTINUED | OUTPATIENT
Start: 2019-10-25 | End: 2019-10-25

## 2019-10-25 RX ORDER — FERROUS SULFATE 325(65) MG
325 TABLET ORAL DAILY
Refills: 0 | Status: DISCONTINUED | OUTPATIENT
Start: 2019-10-25 | End: 2019-10-25

## 2019-10-25 RX ORDER — CLOPIDOGREL BISULFATE 75 MG/1
75 TABLET, FILM COATED ORAL DAILY
Refills: 0 | Status: DISCONTINUED | OUTPATIENT
Start: 2019-10-25 | End: 2019-10-25

## 2019-10-25 RX ORDER — INSULIN LISPRO 100/ML
VIAL (ML) SUBCUTANEOUS
Refills: 0 | Status: DISCONTINUED | OUTPATIENT
Start: 2019-10-25 | End: 2019-10-25

## 2019-10-25 RX ORDER — SEVELAMER CARBONATE 2400 MG/1
800 POWDER, FOR SUSPENSION ORAL
Refills: 0 | Status: DISCONTINUED | OUTPATIENT
Start: 2019-10-25 | End: 2019-10-25

## 2019-10-25 RX ORDER — ERYTHROPOIETIN 10000 [IU]/ML
4000 INJECTION, SOLUTION INTRAVENOUS; SUBCUTANEOUS
Refills: 0 | Status: DISCONTINUED | OUTPATIENT
Start: 2019-10-25 | End: 2019-10-25

## 2019-10-25 RX ORDER — HYDRALAZINE HCL 50 MG
50 TABLET ORAL THREE TIMES A DAY
Refills: 0 | Status: DISCONTINUED | OUTPATIENT
Start: 2019-10-25 | End: 2019-10-25

## 2019-10-25 RX ADMIN — Medication 50 MILLIGRAM(S): at 17:13

## 2019-10-25 RX ADMIN — OXYCODONE AND ACETAMINOPHEN 1 TABLET(S): 5; 325 TABLET ORAL at 17:18

## 2019-10-25 RX ADMIN — CLOPIDOGREL BISULFATE 75 MILLIGRAM(S): 75 TABLET, FILM COATED ORAL at 17:07

## 2019-10-25 RX ADMIN — CHLORHEXIDINE GLUCONATE 1 APPLICATION(S): 213 SOLUTION TOPICAL at 17:22

## 2019-10-25 RX ADMIN — RISPERIDONE 1 MILLIGRAM(S): 4 TABLET ORAL at 05:34

## 2019-10-25 RX ADMIN — Medication 50 MILLIGRAM(S): at 05:33

## 2019-10-25 RX ADMIN — OXYCODONE AND ACETAMINOPHEN 1 TABLET(S): 5; 325 TABLET ORAL at 16:45

## 2019-10-25 RX ADMIN — OXYCODONE AND ACETAMINOPHEN 1 TABLET(S): 5; 325 TABLET ORAL at 17:54

## 2019-10-25 RX ADMIN — ERYTHROPOIETIN 4000 UNIT(S): 10000 INJECTION, SOLUTION INTRAVENOUS; SUBCUTANEOUS at 11:59

## 2019-10-25 RX ADMIN — Medication 500 MILLIGRAM(S): at 17:07

## 2019-10-25 RX ADMIN — CARVEDILOL PHOSPHATE 6.25 MILLIGRAM(S): 80 CAPSULE, EXTENDED RELEASE ORAL at 05:33

## 2019-10-25 RX ADMIN — CARVEDILOL PHOSPHATE 6.25 MILLIGRAM(S): 80 CAPSULE, EXTENDED RELEASE ORAL at 17:19

## 2019-10-25 RX ADMIN — PANTOPRAZOLE SODIUM 40 MILLIGRAM(S): 20 TABLET, DELAYED RELEASE ORAL at 05:34

## 2019-10-25 RX ADMIN — RISPERIDONE 1 MILLIGRAM(S): 4 TABLET ORAL at 17:19

## 2019-10-25 RX ADMIN — Medication 80 MILLIGRAM(S): at 05:33

## 2019-10-25 RX ADMIN — Medication 1 MILLIGRAM(S): at 17:24

## 2019-10-25 RX ADMIN — Medication 325 MILLIGRAM(S): at 17:12

## 2019-10-25 RX ADMIN — Medication 0.5 MILLIGRAM(S): at 17:22

## 2019-10-25 RX ADMIN — OXYCODONE AND ACETAMINOPHEN 1 TABLET(S): 5; 325 TABLET ORAL at 11:18

## 2019-10-25 RX ADMIN — Medication 20 MILLIGRAM(S): at 17:12

## 2019-10-25 RX ADMIN — SEVELAMER CARBONATE 800 MILLIGRAM(S): 2400 POWDER, FOR SUSPENSION ORAL at 17:28

## 2019-10-25 RX ADMIN — Medication 0.5 MILLIGRAM(S): at 05:34

## 2019-10-25 RX ADMIN — Medication 1: at 17:19

## 2019-10-25 RX ADMIN — AMLODIPINE BESYLATE 2.5 MILLIGRAM(S): 2.5 TABLET ORAL at 05:33

## 2019-10-25 NOTE — H&P ADULT - PROBLEM SELECTOR PLAN 1
-Patient ahs hx of chronic hypoxic respiratory failure secondary to fluid overload and KRYSTIN given morbid obesity.   -P/w shortness of breath, however no signs of volume overload at present   -Chest X-ray- mild pulmonary congestion.   -C/w Duoneb, Lasix   -HD in am

## 2019-10-25 NOTE — CONSULT NOTE ADULT - SUBJECTIVE AND OBJECTIVE BOX
[facility] : Progress Note :: [phone], (office [phone]),   Dr La / Dr Hui / Dr Barber / Dr Villalobos / Dr Hang CASTELLANO / Dr Mehta / Dr Sanchez / Dr Alber dumont  _____________________________________________________________________________________________  Patient is a 39y Male with ESRD on HD MWF at [facility]. Also with schizophrenia. Frequent hospitalizations in this hospital with SOB and bilateral leg swelling and pain. Has KRYSTIN, seen a new pulmonologist and work-up ongoing. Presented to ED overnight with SOB and bilateral leg swelling.   He has been compliant with HD schedule but not compliant with  fluid and salt restrictions.   Renal consulted for ESRD     PAST MEDICAL & SURGICAL HISTORY:  COPD (chronic obstructive pulmonary disease)  Migraine  HTN (hypertension)  DM (diabetes mellitus)  Bipolar disorder  Schizophrenia  ESRD on dialysis  Morbid obesity with BMI of 40.0-44.9, adult  H/O hernia repair    aspirin (Short breath)  aspirin (Swelling)  penicillin (Short breath)  penicillins (Swelling)  shellfish (Unknown)    Home Medications Reviewed  Hospital Medications:   MEDICATIONS  (STANDING):  ALBUTerol    90 MICROgram(s) HFA Inhaler 2 Puff(s) Inhalation every 6 hours  amLODIPine   Tablet 2.5 milliGRAM(s) Oral daily  ascorbic acid 500 milliGRAM(s) Oral daily  carvedilol 6.25 milliGRAM(s) Oral every 12 hours  chlorhexidine 4% Liquid 1 Application(s) Topical <User Schedule>  clonazePAM  Tablet 0.5 milliGRAM(s) Oral every 12 hours  clopidogrel Tablet 75 milliGRAM(s) Oral daily  epoetin cyndie Injectable 4000 Unit(s) IV Push <User Schedule>  ferrous    sulfate 325 milliGRAM(s) Oral daily  folic acid 1 milliGRAM(s) Oral daily  furosemide    Tablet 80 milliGRAM(s) Oral daily  gabapentin 300 milliGRAM(s) Oral at bedtime  hydrALAZINE 50 milliGRAM(s) Oral three times a day  insulin lispro (HumaLOG) corrective regimen sliding scale   SubCutaneous Before meals and at bedtime  mirtazapine 7.5 milliGRAM(s) Oral at bedtime  pantoprazole    Tablet 40 milliGRAM(s) Oral before breakfast  PARoxetine 20 milliGRAM(s) Oral daily  risperiDONE   Tablet 1 milliGRAM(s) Oral every 12 hours  sevelamer carbonate 800 milliGRAM(s) Oral three times a day with meals  simvastatin 40 milliGRAM(s) Oral at bedtime    SOCIAL HISTORY:  Denies ETOh,Smoking,   FAMILY HISTORY:  Family history of COPD (chronic obstructive pulmonary disease)  Family history of diabetes mellitus        VITALS:  T(F): 97.3 (10-25-19 @ 05:08), Max: 98 (10-24-19 @ 20:34)  HR: 83 (10-25-19 @ 05:08)  BP: 150/83 (10-25-19 @ 05:08)  RR: 22 (10-25-19 @ 05:08)  SpO2: 95% (10-25-19 @ 05:08)  Wt(kg): --      Weight (kg): 199.6 (10-24 @ 20:34)  PHYSICAL EXAM:  Constitutional: NAD  HEENT: anicteric sclera, oropharynx clear.  Neck: No JVD  Respiratory: CTAB, no wheezes, rales or rhonchi  Cardiovascular: S1, S2, RRR  Gastrointestinal: BS+, soft, NT/ND  Extremities: No cyanosis or clubbing. No peripheral edema  Neurological: A/O x 3, no focal deficits  Psychiatric: Normal mood, normal affect  : No CVA tenderness. No hernandez.   Skin: No rashes  Vascular Access:    LABS:  10-25    137  |  95<L>  |  52<H>  ----------------------------<  133<H>  5.0   |  32<H>  |  7.96<H>    Ca    9.7      25 Oct 2019 10:17  Phos  6.4     10-25  Mg     2.4     10-25    TPro  7.5  /  Alb  2.7<L>  /  TBili  0.5  /  DBili      /  AST  13  /  ALT  18  /  AlkPhos  221<H>  10-25    Creatinine Trend: 7.96 <--, 7.06 <--                        8.9    6.66  )-----------( 256      ( 25 Oct 2019 10:17 )             30.1     Urine Studies:      RADIOLOGY & ADDITIONAL STUDIES:

## 2019-10-25 NOTE — DISCHARGE NOTE PROVIDER - NSDCCPCAREPLAN_GEN_ALL_CORE_FT
PRINCIPAL DISCHARGE DIAGNOSIS  Diagnosis: Chronic hypoxemic respiratory failure  Assessment and Plan of Treatment: You presented with shortness of breath, however no signs of volume overload were found. You have history of chronic hypoxemic respiratory failure and are on home oxygen. Chest X-ray showed mild pulmonary congestion. You were kept on Duoneb, Lasix. Hemodilaysis was performed on 10/25 under Dr La. Your symptoms have now resolved. Please follow with your primary doctor within a week. Please also follow with your pulmonologist..      SECONDARY DISCHARGE DIAGNOSES  Diagnosis: ESRD on dialysis  Assessment and Plan of Treatment: Continue your dialysis as intructed by your Nephrologist. Dialysis will be reinstated upon your discharge.  Please mantain a diet adequate for you condition  Continue dialysis on : MWF.    Diagnosis: HTN (hypertension)  Assessment and Plan of Treatment: Continue with blood pressure medication. Maintain a healthy diet that consist of low sugar, low fat, low sodium diet. Exercise frequently if possible.  Follow up with primary care physician in one week after discharge. PRINCIPAL DISCHARGE DIAGNOSIS  Diagnosis: Acute on chronic respiratory failure with hypoxia  Assessment and Plan of Treatment: You presented with shortness of breath due to mild volume overlaod. You have history of chronic hypoxemic respiratory failure and are on home oxygen. Chest X-ray showed mild pulmonary congestion. You were kept on Duoneb, Lasix. Hemodilaysis was performed on 10/25 under Dr La. Your symptoms have now resolved. Please follow with your primary doctor within a week. Please also follow with your pulmonologist.      SECONDARY DISCHARGE DIAGNOSES  Diagnosis: ESRD on dialysis  Assessment and Plan of Treatment: Continue your dialysis as intructed by your Nephrologist. Dialysis will be reinstated upon your discharge.  Please mantain a diet adequate for you condition  Continue dialysis on : MWF.    Diagnosis: HTN (hypertension)  Assessment and Plan of Treatment: Continue with blood pressure medication. Maintain a healthy diet that consist of low sugar, low fat, low sodium diet. Exercise frequently if possible.  Follow up with primary care physician in one week after discharge.

## 2019-10-25 NOTE — H&P ADULT - PROBLEM SELECTOR PLAN 5
IMPROVE VTE score:  [ ] Previous VTE                                                3  [ ] Thrombophilia                                             2  [ ] Lower limb paralysis                                  2        (unable to hold up >15 seconds)    [ ] Current Cancer (within 6 months)            2   [x] Immobilization > 24 hrs                              1  [ ] ICU/CCU stay > 24 hours                            1  [] Age > 60                                                         1  Improve 1   No need for chemo ppx

## 2019-10-25 NOTE — DISCHARGE NOTE PROVIDER - NSDCFUSCHEDAPPT_GEN_ALL_CORE_FT
PRESTON JOHNSON ; 10/28/2019 ; [facility] [address]  PRESTON JOHNSON ; 01/03/2020 ; [facility] S[facility] [address]  PRESTON JOHNSON ; 01/03/2020 ; [facility] [address] [address]

## 2019-10-25 NOTE — DISCHARGE NOTE NURSING/CASE MANAGEMENT/SOCIAL WORK - NSDCPEFALRISK_GEN_ALL_CORE
Today's exam, diagnostic studies, and/or review of records show NO SIGNIFICANT CHANGE from previous exams. Patient information on fall and injury prevention

## 2019-10-25 NOTE — H&P ADULT - HISTORY OF PRESENT ILLNESS
Pt is a 39 years old male from home with PMHx of ESRD on MWF HD (via LUE AVF), DM (on insulin), cocaine abuse, COPD on home O2 (4L NC), morbid obesity, Bipolar disorder, schizophrenia and HTN came to ED today for shortness of breath and increased leg swelling. Patient had his HD session yesterday and is compliant. History mostly obtained from ED provider, upon my evaluation patient was sleeping comfortably in bed, saturating 99% on 3L NC. Repeated attempts were made to wake him up, he woke up briefly and slept again. Patient was recently admitted for similar complaint and was discharged on 10/15.

## 2019-10-25 NOTE — DISCHARGE NOTE PROVIDER - PROVIDER TOKENS
PROVIDER:[TOKEN:[[id number]],FOLLOWUP:[1 week]] PROVIDER:[TOKEN:[[id number]],FOLLOWUP:[1 week]],PROVIDER:[TOKEN:[[id number]]]

## 2019-10-25 NOTE — DISCHARGE NOTE PROVIDER - NSDCPNSUBOBJ_GEN_ALL_CORE
Patient is a 39y old  Male who presents with a chief complaint of sob and leg swelling (25 Oct 2019 14:18)        Today:    Patient was seen and examined at bedside     Reports SOB has improved             INTERVAL HPI/OVERNIGHT EVENTS:    T(C): 36.9 (10-25-19 @ 14:31), Max: 36.9 (10-25-19 @ 14:31)    HR: 91 (10-25-19 @ 14:31) (72 - 98)    BP: 155/78 (10-25-19 @ 14:31) (135/73 - 155/78)    RR: 20 (10-25-19 @ 14:31) (18 - 24)    SpO2: 100% (10-25-19 @ 14:31) (95% - 100%)    Wt(kg): --    I&O's Summary            REVIEW OF SYSTEMS: denies fever, chills, SOB, palpitations, chest pain, abdominal pain, nausea, vomitting, diarrhea, constipation, dizziness        MEDICATIONS  (STANDING):    ALBUTerol    90 MICROgram(s) HFA Inhaler 2 Puff(s) Inhalation every 6 hours    amLODIPine   Tablet 2.5 milliGRAM(s) Oral daily    ascorbic acid 500 milliGRAM(s) Oral daily    carvedilol 6.25 milliGRAM(s) Oral every 12 hours    chlorhexidine 4% Liquid 1 Application(s) Topical <User Schedule>    clonazePAM  Tablet 0.5 milliGRAM(s) Oral every 12 hours    clopidogrel Tablet 75 milliGRAM(s) Oral daily    epoetin cyndie Injectable 4000 Unit(s) IV Push <User Schedule>    ferrous    sulfate 325 milliGRAM(s) Oral daily    folic acid 1 milliGRAM(s) Oral daily    furosemide    Tablet 80 milliGRAM(s) Oral daily    gabapentin 300 milliGRAM(s) Oral at bedtime    hydrALAZINE 50 milliGRAM(s) Oral three times a day    insulin lispro (HumaLOG) corrective regimen sliding scale   SubCutaneous Before meals and at bedtime    mirtazapine 7.5 milliGRAM(s) Oral at bedtime    pantoprazole    Tablet 40 milliGRAM(s) Oral before breakfast    PARoxetine 20 milliGRAM(s) Oral daily    risperiDONE   Tablet 1 milliGRAM(s) Oral every 12 hours    sevelamer carbonate 800 milliGRAM(s) Oral three times a day with meals    simvastatin 40 milliGRAM(s) Oral at bedtime        MEDICATIONS  (PRN):    methocarbamol 500 milliGRAM(s) Oral every 12 hours PRN spasms    oxycodone    5 mG/acetaminophen 325 mG 1 Tablet(s) Oral every 4 hours PRN Severe Pain (7 - 10)            PHYSICAL EXAM:    GENERAL: NAD, obese    NERVOUS SYSTEM:  Alert & Oriented X3, Good concentration;     CHEST/LUNG: few rales, NO rhonchi, wheezing, or rubs    HEART: Regular rate and rhythm; No murmurs, rubs, or gallops    ABDOMEN: Soft, Nontender, Nondistended; Bowel sounds present    EXTREMITIES:  2+ Peripheral Pulses, No clubbing, cyanosis, or edema    LYMPH: No lymphadenopathy noted    SKIN: No rashes or lesions    LABS:                            8.9      6.66  )-----------( 256      ( 25 Oct 2019 10:17 )               30.1         10-25        137  |  95<L>  |  52<H>    ----------------------------<  133<H>    5.0   |  32<H>  |  7.96<H>        Ca    9.7      25 Oct 2019 10:17    Phos  6.4     10-25    Mg     2.4     10-25        TPro  7.5  /  Alb  2.7<L>  /  TBili  0.5  /  DBili  x   /  AST  13  /  ALT  18  /  AlkPhos  221<H>  10-25        CAPILLARY BLOOD GLUCOSE        POCT Blood Glucose.: 161 mg/dL (25 Oct 2019 17:11)    POCT Blood Glucose.: 126 mg/dL (25 Oct 2019 11:23)    POCT Blood Glucose.: 110 mg/dL (25 Oct 2019 08:10)        Assess Patient is a 39y old  Male who presents with a chief complaint of sob and leg swelling (25 Oct 2019 14:18)        Today:    Patient was seen and examined at bedside     Reports SOB has improved             INTERVAL HPI/OVERNIGHT EVENTS:    T(C): 36.9 (10-25-19 @ 14:31), Max: 36.9 (10-25-19 @ 14:31)    HR: 91 (10-25-19 @ 14:31) (72 - 98)    BP: 155/78 (10-25-19 @ 14:31) (135/73 - 155/78)    RR: 20 (10-25-19 @ 14:31) (18 - 24)    SpO2: 100% (10-25-19 @ 14:31) (95% - 100%)    Wt(kg): --    I&O's Summary            REVIEW OF SYSTEMS: denies fever, chills, SOB, palpitations, chest pain, abdominal pain, nausea, vomitting, diarrhea, constipation, dizziness        MEDICATIONS  (STANDING):    ALBUTerol    90 MICROgram(s) HFA Inhaler 2 Puff(s) Inhalation every 6 hours    amLODIPine   Tablet 2.5 milliGRAM(s) Oral daily    ascorbic acid 500 milliGRAM(s) Oral daily    carvedilol 6.25 milliGRAM(s) Oral every 12 hours    chlorhexidine 4% Liquid 1 Application(s) Topical <User Schedule>    clonazePAM  Tablet 0.5 milliGRAM(s) Oral every 12 hours    clopidogrel Tablet 75 milliGRAM(s) Oral daily    epoetin cyndie Injectable 4000 Unit(s) IV Push <User Schedule>    ferrous    sulfate 325 milliGRAM(s) Oral daily    folic acid 1 milliGRAM(s) Oral daily    furosemide    Tablet 80 milliGRAM(s) Oral daily    gabapentin 300 milliGRAM(s) Oral at bedtime    hydrALAZINE 50 milliGRAM(s) Oral three times a day    insulin lispro (HumaLOG) corrective regimen sliding scale   SubCutaneous Before meals and at bedtime    mirtazapine 7.5 milliGRAM(s) Oral at bedtime    pantoprazole    Tablet 40 milliGRAM(s) Oral before breakfast    PARoxetine 20 milliGRAM(s) Oral daily    risperiDONE   Tablet 1 milliGRAM(s) Oral every 12 hours    sevelamer carbonate 800 milliGRAM(s) Oral three times a day with meals    simvastatin 40 milliGRAM(s) Oral at bedtime        MEDICATIONS  (PRN):    methocarbamol 500 milliGRAM(s) Oral every 12 hours PRN spasms    oxycodone    5 mG/acetaminophen 325 mG 1 Tablet(s) Oral every 4 hours PRN Severe Pain (7 - 10)            PHYSICAL EXAM:    GENERAL: NAD, obese    NERVOUS SYSTEM:  Alert & Oriented X3, Good concentration;     CHEST/LUNG: few rales, NO rhonchi, wheezing, or rubs    HEART: Regular rate and rhythm; No murmurs, rubs, or gallops    ABDOMEN: Soft, Nontender, Nondistended; Bowel sounds present    EXTREMITIES:  2+ Peripheral Pulses, No clubbing, cyanosis, or edema    LYMPH: No lymphadenopathy noted    SKIN: No rashes or lesions    LABS:                            8.9      6.66  )-----------( 256      ( 25 Oct 2019 10:17 )               30.1         10-25        137  |  95<L>  |  52<H>    ----------------------------<  133<H>    5.0   |  32<H>  |  7.96<H>        Ca    9.7      25 Oct 2019 10:17    Phos  6.4     10-25    Mg     2.4     10-25        TPro  7.5  /  Alb  2.7<L>  /  TBili  0.5  /  DBili  x   /  AST  13  /  ALT  18  /  AlkPhos  221<H>  10-25        CAPILLARY BLOOD GLUCOSE        POCT Blood Glucose.: 161 mg/dL (25 Oct 2019 17:11)    POCT Blood Glucose.: 126 mg/dL (25 Oct 2019 11:23)    POCT Blood Glucose.: 110 mg/dL (25 Oct 2019 08:10)        Assessment and plan:    1. Acute on chronic hypoxic respiratory failure:    CXR shows mild pulmonary congestion     Improved after HD    Continue with Symbicort and Tudorza at home        2. ESRD on HD    Resume HD     Dr La consult appreciated        3. DM    Cont home dose of insulin         4. HTN    Cont Home medications        Patient is stable to be discharged home

## 2019-10-25 NOTE — CONSULT NOTE ADULT - ASSESSMENT
Patient is a 39y Male with ESRD on HD MWF at [facility]. Also with schizophrenia. Frequent hospitalizations in this hospital with SOB and bilateral leg swelling and pain. Has KRYSTIN, seen a new pulmonologist and work-up ongoing. Presented to ED overnight with SOB and bilateral leg swelling.   He has been compliant with HD schedule but not compliant with  fluid and salt restrictions.   Renal consulted for ESRD     A/P  #ESRD HD today with ultrafiltration as tolerated  # anemia of CKD procrit on HD   # schizophrenia cont antipsychotics  # renal osteodystrophy cont sevelamer    Many thanks for the consultation

## 2019-10-25 NOTE — DISCHARGE NOTE PROVIDER - HOSPITAL COURSE
Pt is a 39 years old male from home with PMHx of ESRD on MWF HD (via LUE AVF), DM (on insulin), cocaine abuse, COPD on home O2 (4L NC), morbid obesity, Bipolar disorder, schizophrenia and HTN came to ED today for shortness of breath and increased leg swelling. Patient had his HD session yesterday and is compliant. History mostly obtained from ED provider, upon my evaluation patient was sleeping comfortably in bed, saturating 99% on 3L NC. Repeated attempts were made to wake him up, he woke up briefly and slept again. Patient was recently admitted for similar complaint and was discharged on 10/15.         Pt was admitted for chronic hypoxemic respiratory failure.Chest X-ray- mild pulmonary congestion. Pt was continued on Duoneb, Lasix. Dr La was consulted for ESRD. Pt scheduled for HD on 10/25.      Patient is stable for discharge per attending and is advised to follow up with PCP as outpatient    Please refer to patient's complete medical chart with documents for a full hospital course, for this is only a brief summary. Pt is a 39 years old male from home with PMHx of ESRD on MWF HD (via LUE AVF), DM (on insulin), cocaine abuse, COPD on home O2 (4L NC), morbid obesity, Bipolar disorder, schizophrenia and HTN came to ED today for shortness of breath and increased leg swelling. Patient had his HD session yesterday and is compliant.         Pt was admitted for acute on chronic hypoxemic respiratory failure. Chest X-ray- mild pulmonary congestion. Pt was continued on Duoneb, Lasix. Dr La was consulted for ESRD. Patient received HD and SOB improved.      Patient is stable for discharge per attending and is advised to follow up with PCP and primary pulmonologist as outpatient. Will resume HD at his HD center too.     Patient was complaining of BL LE pain ( chronic). LE doppler was negative.        Please refer to patient's complete medical chart with documents for a full hospital course, for this is only a brief summary.

## 2019-10-25 NOTE — H&P ADULT - ATTENDING COMMENTS
Case discussed with Housestaff. Agree with HPI as above with addendum  Pt known to me from multiple admission from the past. Deeply drowsy and unable to give history. Relied on ED and chart review.    This is a 39 year old man with PMH significant for morbid obesity, ESRD on HD with Dr La, DM2-controlled, HTN, chronic resp. failure on Home O2, hx of substance abuse presenting to the ED with  shortness of breath  and worsening B/L leg swelling X 1 day in spite of compliance on HD. Pt is however sleeping soundly with no respiratory distress and oxygen saturation 100% with home O2 supplement via NC.    Vital Signs Last 24 Hrs  T(C): 36.2 (25 Oct 2019 03:24), Max: 36.7 (24 Oct 2019 20:34)  T(F): 97.1 (25 Oct 2019 03:24), Max: 98 (24 Oct 2019 20:34)  HR: 72 (25 Oct 2019 03:24) (72 - 98)  BP: 146/107 (25 Oct 2019 03:24) (135/73 - 149/83)  RR: 24 (25 Oct 2019 03:24) (18 - 24)  SpO2: 99% (25 Oct 2019 03:24) (96% - 99%)    Limited exam  Young man, morbidly obese, sleeping , snoring but no resp distress  Appreciable air entry anteriorly ; RRR S1S2 nonly  Full obese abdomen soft no distension  ++pitting pedal edema    Labs                        9.0    8.75  )-----------( 281      ( 24 Oct 2019 21:43 )             30.6     10-24    137  |  97  |  46<H>  ----------------------------<  132<H>  4.5   |  32<H>  |  7.06<H>    Ca    10.7<H>      24 Oct 2019 21:43    TPro  7.6  /  Alb  2.9<L>  /  TBili  0.5  /  DBili  x   /  AST  17  /  ALT  20  /  AlkPhos  232<H>  10-24    CXR -  pulm vascular congestive changes improved from prior    Impression  39 year old man with hx as detailed above presenting with 1 day of persistent SOB and LE edema similar to previosu admissions despite HD but no evidence of worsening fluid overload.     A/P  No evidence of new fluid overload  ESRD on HD  Chronic hypercapnic respiratory failure likely from fluid overload, morbid obesity +/- undiagnosed KRYSTIN on home O2  Metabolic alkalosis which appears chronic likely compensatory response to underlying respiratory acidosis    Plan  Monitor weight and I/O daily  Renal diet  HD session scheduled for today; Dr La to be consulted

## 2019-10-25 NOTE — H&P ADULT - ASSESSMENT
Pt is a 39 years old male from home with PMHx of ESRD on MWF HD (via LUE AVF), DM (on insulin), cocaine abuse, COPD on home O2 (4L NC), morbid obesity, Bipolar disorder, schizophrenia and HTN came to ED today for shortness of breath and increased leg swelling.

## 2019-10-25 NOTE — DISCHARGE NOTE PROVIDER - CARE PROVIDER_API CALL
Eliceo Coburn)  Family Medicine  [address] [place] [address]  Phone: [phone]  Fax: [phone]  Follow Up Time: 1 week Eliceo Coburn)  Family Medicine  [address] [place] [address]  Phone: [phone]  Fax: [phone]  Follow Up Time: 1 week    Matt Plata)  Internal Medicine; Pulmonary Disease  [address] [id number]  Phone: [phone]  Fax: [phone]  Follow Up Time:

## 2019-10-25 NOTE — DISCHARGE NOTE NURSING/CASE MANAGEMENT/SOCIAL WORK - PATIENT PORTAL LINK FT
You can access the FollowMyHealth Patient Portal offered by [facility] by registering at the following website: [email or web address]. By joining [employer]’s FollowMyHealth portal, you will also be able to view your health information using other applications (apps) compatible with our system.

## 2019-10-25 NOTE — DISCHARGE NOTE PROVIDER - CARE PROVIDERS DIRECT ADDRESSES
[email or web address]

## 2019-10-25 NOTE — H&P ADULT - NSHPPHYSICALEXAM_GEN_ALL_CORE
CONSTITUTIONAL: Morbidly obese male patient, laying comfortably in bed   ENMT: Airway patent, Nasal mucosa clear. Mouth with normal mucosa. Throat has no vesicles, no oropharyngeal exudates and uvula is midline.  EYES: Clear bilaterally, pupils equal, round and reactive to light.  CARDIAC: Normal rate, regular rhythm.  Heart sounds S1, S2.  No murmurs, rubs or gallops   RESPIRATORY: Breath sounds clear and equal bilaterally. No wheezes, rhales or rhonchi  MUSCULOSKELETAL: Spine appears normal.   EXTREMITIES: bilateral edema 2+  NEUROLOGICAL: Could not be assessed   SKIN: No rash, skin turgor , multiple tattoos

## 2019-10-28 ENCOUNTER — APPOINTMENT (OUTPATIENT)
Dept: SURGERY | Facility: CLINIC | Age: 39
End: 2019-10-28
Payer: MEDICARE

## 2019-10-28 VITALS
HEIGHT: 78 IN | WEIGHT: 315 LBS | SYSTOLIC BLOOD PRESSURE: 158 MMHG | BODY MASS INDEX: 36.45 KG/M2 | RESPIRATION RATE: 17 BRPM | HEART RATE: 84 BPM | OXYGEN SATURATION: 97 % | DIASTOLIC BLOOD PRESSURE: 91 MMHG | TEMPERATURE: 97.8 F

## 2019-10-28 DIAGNOSIS — Z01.818 ENCOUNTER FOR OTHER PREPROCEDURAL EXAMINATION: ICD-10-CM

## 2019-10-28 DIAGNOSIS — Z99.2 DEPENDENCE ON RENAL DIALYSIS: ICD-10-CM

## 2019-10-28 DIAGNOSIS — J45.909 UNSPECIFIED ASTHMA, UNCOMPLICATED: ICD-10-CM

## 2019-10-28 DIAGNOSIS — E11.9 TYPE 2 DIABETES MELLITUS W/OUT COMPLICATIONS: ICD-10-CM

## 2019-10-28 DIAGNOSIS — I10 ESSENTIAL (PRIMARY) HYPERTENSION: ICD-10-CM

## 2019-10-28 DIAGNOSIS — R60.0 LOCALIZED EDEMA: ICD-10-CM

## 2019-10-28 DIAGNOSIS — J44.9 CHRONIC OBSTRUCTIVE PULMONARY DISEASE, UNSPECIFIED: ICD-10-CM

## 2019-10-28 PROCEDURE — 99205 OFFICE O/P NEW HI 60 MIN: CPT

## 2019-10-28 NOTE — CONSULT LETTER
[Dear  ___] : Dear  [unfilled], [Consult Letter:] : I had the pleasure of evaluating your patient, [unfilled]. [Please see my note below.] : Please see my note below. [Consult Closing:] : Thank you very much for allowing me to participate in the care of this patient.  If you have any questions, please do not hesitate to contact me. [Sincerely,] : Sincerely, [FreeTextEntry2] : Dr. Coburn [FreeTextEntry3] : Rakesh Stauffer MD, FACS\par [facility]\par [address]\[place] [address]\par tel. [phone]\par fax [phone]\par  \par

## 2019-10-28 NOTE — HISTORY OF PRESENT ILLNESS
[de-identified] : Mr. Mace is a 38 y/o male here for a consultation for weight loss surgery.\par \par The patient has weighed as much as 600 pounds and is currently 449 pounds.  He was able to lose the weight with strict dieting, however he has not been able to exercise due to knee pain. He has struggled with obesity for many years.  Significant comorbidities include type II diabetes, hypertension, renal failure on dialysis, asthma/COPD with recent hospitalization for uncontrolled asthma and currently requiring supplemental oxygen via nasal cannula.  He has attended multiple bariatric surgery seminars and is well educated on the various procedures.  He feels he is ready for surgical intervention given his worsening comorbidities.\par \par Past surgical history: Laparoscopic infraumbilical ventral hernia repair with mesh, left upper extremity AV fistula with stent\par Former smoker, quit 3 months ago.\par Denies significant reflux or dysphagia symptoms.

## 2019-10-28 NOTE — PHYSICAL EXAM
[Obese, well nourished, in no acute distress] : obese, well nourished, in no acute distress [Normal] : affect appropriate [de-identified] : On oxygen via nasal cannula [de-identified] : breathing comfortably [de-identified] : obese, soft, nontender, nondistended. Well healed left-sided laparoscopic incisions [de-identified] : 3+ bilateral LE edema

## 2019-10-28 NOTE — PLAN
[FreeTextEntry1] : I have reviewed all surgical and nonsurgical options with the patient, who would like to proceed with sleeve gastrectomy. Based on my assessment, I feel he is a reasonable candidate for laparoscopic sleeve gastrectomy. We have reviewed all the risks and benefits, including the risk of bleeding, leak, infection, sepsis, malnutrition, reflux, vomiting, and weight regain. We have discussed his risk of pulmonary complication given his significant asthma and oxygen requirements.  We have also discussed the need for  lifelong vitamin supplementation, as well as the need for strict adherence to an appropriate bariatric diet postoperatively and maintaining an active lifestyle. I reviewed the importance of behavioral modification and regular follow-up in order to optimize outcomes and avoid complications. All questions and concerns were addressed. \par The patient will undergo laparoscopic sleeve gastrectomy after completion of medical weight management program.\par Requires:\par [] medical clearance\par [] cardiology clearance\par [] pulmonary clearance\par [] renal clearance\par [] UGIS\par [] upper endoscopy\par

## 2019-10-28 NOTE — ASSESSMENT
[FreeTextEntry1] : 39-year-old male with morbid obesity, BMI of 50, and significant comorbidities of diabetes, hypertension, asthma/COPD, endstage renal disease on dialysis, lower extremity swelling, and joint pain who has failed multiple prior conservative methods of weight loss.  He is interested in bariatric surgery and meets NIH criteria.

## 2019-10-28 NOTE — REVIEW OF SYSTEMS
[Shortness Of Breath] : shortness of breath [Wheezing] : wheezing [Joint Pain] : joint pain [Negative] : Psychiatric

## 2019-10-30 LAB — GLUCOSE BLDC GLUCOMTR-MCNC: 113 MG/DL — HIGH (ref 70–99)

## 2019-11-02 NOTE — ED ADULT NURSE NOTE - CAS DISCH BELONGINGS RETURNED
Sleep Study Documentation    Pre-Sleep Study       Sleep testing procedure explained to patient:YES    Patient napped prior to study:YES- more than 30 minutes  Napped after 2PM: yes    Caffeine:Dayshift worker after 12PM   Caffeine use:NO    Alcohol:Dayshift workers after 5PM: Alcohol use:NO    Typical day for patient:YES       Study Documentation    Sleep Study Indications:  JORDANA-diagnosed home study at [address] [facility]  Sleep Study: Treatment   Optimal PAP pressure:  4 cm H2O  Leak:Small  Snore:Not eliminated  REM Obtained:no  Supplemental O2: no    Minimum SaO2  88%  Baseline SaO2  94 1 %  PAP mask tried (list all) [employer] 2 Small Nasal Mask and heated humidity  PAP mask choice (final) same  PAP mask type:nasal  PAP pressure at which snoring was eliminated  Not enough sleep  Minimum SaO2 at final PAP pressure  88 %  Mode of Therapy:CPAP  ETCO2:No  CPAP changed to BiPAP:No    Mode of Therapy:CPAP    EKG abnormalities: no     EEG abnormalities: no    Sleep Study Recorded < 2 hours: N/A    Sleep Study Recorded > 2 hours but incomplete study: N/A    Sleep Study Recorded 6 hours but no sleep obtained: NO          Post-Sleep Study    Medication used at bedtime or during sleep study:NO    Patient reports time it took to fall asleep:20 to 30 minutes    Patient reports waking up during study:3 or more times  Patient reports returning to sleep in greater than 30 minutes  Patient reports sleeping 2 to 4 hours without dreaming  Patient reports sleep during study:worse than usual    Patient rated sleepiness: Very sleepy or tired    PAP treatment:yes: Post PAP treatment patient reports feeling worse and  would not wear PAP mask at home  Not applicable

## 2019-11-10 ENCOUNTER — INPATIENT (INPATIENT)
Facility: HOSPITAL | Age: 39
LOS: 3 days | Discharge: HOME | DRG: 640 | End: 2019-11-14
Attending: INTERNAL MEDICINE | Admitting: INTERNAL MEDICINE
Payer: MEDICARE

## 2019-11-10 VITALS
SYSTOLIC BLOOD PRESSURE: 171 MMHG | TEMPERATURE: 99 F | DIASTOLIC BLOOD PRESSURE: 92 MMHG | OXYGEN SATURATION: 100 % | HEART RATE: 88 BPM | RESPIRATION RATE: 18 BRPM

## 2019-11-10 DIAGNOSIS — Z98.890 OTHER SPECIFIED POSTPROCEDURAL STATES: Chronic | ICD-10-CM

## 2019-11-10 LAB
ALBUMIN SERPL ELPH-MCNC: 2.8 G/DL — LOW (ref 3.5–5)
ALP SERPL-CCNC: 243 U/L — HIGH (ref 40–120)
ALT FLD-CCNC: 16 U/L DA — SIGNIFICANT CHANGE UP (ref 10–60)
ANION GAP SERPL CALC-SCNC: 10 MMOL/L — SIGNIFICANT CHANGE UP (ref 5–17)
AST SERPL-CCNC: 13 U/L — SIGNIFICANT CHANGE UP (ref 10–40)
BILIRUB SERPL-MCNC: 0.5 MG/DL — SIGNIFICANT CHANGE UP (ref 0.2–1.2)
BUN SERPL-MCNC: 62 MG/DL — HIGH (ref 7–18)
CALCIUM SERPL-MCNC: 9 MG/DL — SIGNIFICANT CHANGE UP (ref 8.4–10.5)
CHLORIDE SERPL-SCNC: 97 MMOL/L — SIGNIFICANT CHANGE UP (ref 96–108)
CO2 SERPL-SCNC: 30 MMOL/L — SIGNIFICANT CHANGE UP (ref 22–31)
CREAT SERPL-MCNC: 8.17 MG/DL — HIGH (ref 0.5–1.3)
GLUCOSE SERPL-MCNC: 108 MG/DL — HIGH (ref 70–99)
HCT VFR BLD CALC: 30 % — LOW (ref 39–50)
HGB BLD-MCNC: 8.9 G/DL — LOW (ref 13–17)
MAGNESIUM SERPL-MCNC: 2.6 MG/DL — SIGNIFICANT CHANGE UP (ref 1.6–2.6)
MCHC RBC-ENTMCNC: 27.4 PG — SIGNIFICANT CHANGE UP (ref 27–34)
MCHC RBC-ENTMCNC: 29.7 GM/DL — LOW (ref 32–36)
MCV RBC AUTO: 92.3 FL — SIGNIFICANT CHANGE UP (ref 80–100)
NRBC # BLD: 0 /100 WBCS — SIGNIFICANT CHANGE UP (ref 0–0)
PHOSPHATE SERPL-MCNC: 6.2 MG/DL — HIGH (ref 2.5–4.5)
PLATELET # BLD AUTO: 314 K/UL — SIGNIFICANT CHANGE UP (ref 150–400)
POTASSIUM SERPL-MCNC: 4.4 MMOL/L — SIGNIFICANT CHANGE UP (ref 3.5–5.3)
POTASSIUM SERPL-SCNC: 4.4 MMOL/L — SIGNIFICANT CHANGE UP (ref 3.5–5.3)
PROT SERPL-MCNC: 8 G/DL — SIGNIFICANT CHANGE UP (ref 6–8.3)
RBC # BLD: 3.25 M/UL — LOW (ref 4.2–5.8)
RBC # FLD: 18.5 % — HIGH (ref 10.3–14.5)
SODIUM SERPL-SCNC: 137 MMOL/L — SIGNIFICANT CHANGE UP (ref 135–145)
TROPONIN I SERPL-MCNC: 0.03 NG/ML — SIGNIFICANT CHANGE UP (ref 0–0.04)
WBC # BLD: 9.72 K/UL — SIGNIFICANT CHANGE UP (ref 3.8–10.5)
WBC # FLD AUTO: 9.72 K/UL — SIGNIFICANT CHANGE UP (ref 3.8–10.5)

## 2019-11-10 PROCEDURE — 93010 ELECTROCARDIOGRAM REPORT: CPT

## 2019-11-10 PROCEDURE — 71045 X-RAY EXAM CHEST 1 VIEW: CPT | Mod: 26

## 2019-11-10 RX ORDER — MORPHINE SULFATE 50 MG/1
6 CAPSULE, EXTENDED RELEASE ORAL ONCE
Refills: 0 | Status: DISCONTINUED | OUTPATIENT
Start: 2019-11-10 | End: 2019-11-10

## 2019-11-10 RX ADMIN — MORPHINE SULFATE 6 MILLIGRAM(S): 50 CAPSULE, EXTENDED RELEASE ORAL at 23:00

## 2019-11-10 NOTE — ED PROVIDER NOTE - CARE PLAN
Principal Discharge DX:	Dyspnea  Secondary Diagnosis:	Edema  Secondary Diagnosis:	ESRD (end stage renal disease) on dialysis

## 2019-11-10 NOTE — ED ADULT TRIAGE NOTE - CHIEF COMPLAINT QUOTE
I have shortness of breath, dyspnea on exertion and leg pain after walking over [facility]

## 2019-11-10 NOTE — ED PROVIDER NOTE - PHYSICAL EXAMINATION
Cardiac: L AV fistula, positive bruit thrills. Bilateral LE +2 pitting edema. L AV fistula, positive bruit thrills. Bilateral LE +2 pitting edema.

## 2019-11-10 NOTE — ED PROVIDER NOTE - CLINICAL SUMMARY MEDICAL DECISION MAKING FREE TEXT BOX
Pt states his  leg edema and tenderness is  progressive and unable to transport himself for HD in am. MAR and DR. Meléndez endorsed. Pt agrees with admission. I had a detailed discussion with the patient and/or guardian regarding the historical points, exam findings, and any diagnostic results supporting the admit diagnosis.

## 2019-11-11 DIAGNOSIS — J44.9 CHRONIC OBSTRUCTIVE PULMONARY DISEASE, UNSPECIFIED: ICD-10-CM

## 2019-11-11 DIAGNOSIS — R07.9 CHEST PAIN, UNSPECIFIED: ICD-10-CM

## 2019-11-11 DIAGNOSIS — F19.10 OTHER PSYCHOACTIVE SUBSTANCE ABUSE, UNCOMPLICATED: ICD-10-CM

## 2019-11-11 DIAGNOSIS — E66.01 MORBID (SEVERE) OBESITY DUE TO EXCESS CALORIES: ICD-10-CM

## 2019-11-11 DIAGNOSIS — R06.00 DYSPNEA, UNSPECIFIED: ICD-10-CM

## 2019-11-11 DIAGNOSIS — E11.9 TYPE 2 DIABETES MELLITUS WITHOUT COMPLICATIONS: ICD-10-CM

## 2019-11-11 DIAGNOSIS — I50.33 ACUTE ON CHRONIC DIASTOLIC (CONGESTIVE) HEART FAILURE: ICD-10-CM

## 2019-11-11 DIAGNOSIS — I10 ESSENTIAL (PRIMARY) HYPERTENSION: ICD-10-CM

## 2019-11-11 DIAGNOSIS — F31.9 BIPOLAR DISORDER, UNSPECIFIED: ICD-10-CM

## 2019-11-11 DIAGNOSIS — N18.6 END STAGE RENAL DISEASE: ICD-10-CM

## 2019-11-11 DIAGNOSIS — Z29.9 ENCOUNTER FOR PROPHYLACTIC MEASURES, UNSPECIFIED: ICD-10-CM

## 2019-11-11 LAB
ALBUMIN SERPL ELPH-MCNC: 2.7 G/DL — LOW (ref 3.5–5)
ALP SERPL-CCNC: 227 U/L — HIGH (ref 40–120)
ALT FLD-CCNC: 17 U/L DA — SIGNIFICANT CHANGE UP (ref 10–60)
ANION GAP SERPL CALC-SCNC: 12 MMOL/L — SIGNIFICANT CHANGE UP (ref 5–17)
AST SERPL-CCNC: 14 U/L — SIGNIFICANT CHANGE UP (ref 10–40)
BASOPHILS # BLD AUTO: 0.04 K/UL — SIGNIFICANT CHANGE UP (ref 0–0.2)
BASOPHILS NFR BLD AUTO: 0.5 % — SIGNIFICANT CHANGE UP (ref 0–2)
BILIRUB SERPL-MCNC: 0.6 MG/DL — SIGNIFICANT CHANGE UP (ref 0.2–1.2)
BUN SERPL-MCNC: 66 MG/DL — HIGH (ref 7–18)
CALCIUM SERPL-MCNC: 9.2 MG/DL — SIGNIFICANT CHANGE UP (ref 8.4–10.5)
CHLORIDE SERPL-SCNC: 94 MMOL/L — LOW (ref 96–108)
CO2 SERPL-SCNC: 31 MMOL/L — SIGNIFICANT CHANGE UP (ref 22–31)
CREAT SERPL-MCNC: 8.68 MG/DL — HIGH (ref 0.5–1.3)
EOSINOPHIL # BLD AUTO: 0.55 K/UL — HIGH (ref 0–0.5)
EOSINOPHIL NFR BLD AUTO: 6.4 % — HIGH (ref 0–6)
GLUCOSE BLDC GLUCOMTR-MCNC: 100 MG/DL — HIGH (ref 70–99)
GLUCOSE BLDC GLUCOMTR-MCNC: 105 MG/DL — HIGH (ref 70–99)
GLUCOSE BLDC GLUCOMTR-MCNC: 85 MG/DL — SIGNIFICANT CHANGE UP (ref 70–99)
GLUCOSE BLDC GLUCOMTR-MCNC: 98 MG/DL — SIGNIFICANT CHANGE UP (ref 70–99)
GLUCOSE SERPL-MCNC: 102 MG/DL — HIGH (ref 70–99)
HCT VFR BLD CALC: 30 % — LOW (ref 39–50)
HGB BLD-MCNC: 8.7 G/DL — LOW (ref 13–17)
IMM GRANULOCYTES NFR BLD AUTO: 0.3 % — SIGNIFICANT CHANGE UP (ref 0–1.5)
LYMPHOCYTES # BLD AUTO: 1.15 K/UL — SIGNIFICANT CHANGE UP (ref 1–3.3)
LYMPHOCYTES # BLD AUTO: 13.4 % — SIGNIFICANT CHANGE UP (ref 13–44)
MAGNESIUM SERPL-MCNC: 2.5 MG/DL — SIGNIFICANT CHANGE UP (ref 1.6–2.6)
MCHC RBC-ENTMCNC: 27.2 PG — SIGNIFICANT CHANGE UP (ref 27–34)
MCHC RBC-ENTMCNC: 29 GM/DL — LOW (ref 32–36)
MCV RBC AUTO: 93.8 FL — SIGNIFICANT CHANGE UP (ref 80–100)
MONOCYTES # BLD AUTO: 1.28 K/UL — HIGH (ref 0–0.9)
MONOCYTES NFR BLD AUTO: 14.9 % — HIGH (ref 2–14)
NEUTROPHILS # BLD AUTO: 5.56 K/UL — SIGNIFICANT CHANGE UP (ref 1.8–7.4)
NEUTROPHILS NFR BLD AUTO: 64.5 % — SIGNIFICANT CHANGE UP (ref 43–77)
NRBC # BLD: 0 /100 WBCS — SIGNIFICANT CHANGE UP (ref 0–0)
NT-PROBNP SERPL-SCNC: 44 PG/ML — SIGNIFICANT CHANGE UP (ref 0–125)
PHOSPHATE SERPL-MCNC: 7.3 MG/DL — HIGH (ref 2.5–4.5)
PLATELET # BLD AUTO: 286 K/UL — SIGNIFICANT CHANGE UP (ref 150–400)
POTASSIUM SERPL-MCNC: 4.6 MMOL/L — SIGNIFICANT CHANGE UP (ref 3.5–5.3)
POTASSIUM SERPL-SCNC: 4.6 MMOL/L — SIGNIFICANT CHANGE UP (ref 3.5–5.3)
PROT SERPL-MCNC: 7.8 G/DL — SIGNIFICANT CHANGE UP (ref 6–8.3)
RBC # BLD: 3.2 M/UL — LOW (ref 4.2–5.8)
RBC # FLD: 18.6 % — HIGH (ref 10.3–14.5)
SODIUM SERPL-SCNC: 137 MMOL/L — SIGNIFICANT CHANGE UP (ref 135–145)
WBC # BLD: 8.61 K/UL — SIGNIFICANT CHANGE UP (ref 3.8–10.5)
WBC # FLD AUTO: 8.61 K/UL — SIGNIFICANT CHANGE UP (ref 3.8–10.5)

## 2019-11-11 PROCEDURE — 99285 EMERGENCY DEPT VISIT HI MDM: CPT

## 2019-11-11 PROCEDURE — 99223 1ST HOSP IP/OBS HIGH 75: CPT

## 2019-11-11 RX ORDER — FUROSEMIDE 40 MG
80 TABLET ORAL ONCE
Refills: 0 | Status: COMPLETED | OUTPATIENT
Start: 2019-11-11 | End: 2019-11-11

## 2019-11-11 RX ORDER — HYDRALAZINE HCL 50 MG
50 TABLET ORAL THREE TIMES A DAY
Refills: 0 | Status: DISCONTINUED | OUTPATIENT
Start: 2019-11-11 | End: 2019-11-14

## 2019-11-11 RX ORDER — CLOPIDOGREL BISULFATE 75 MG/1
75 TABLET, FILM COATED ORAL DAILY
Refills: 0 | Status: DISCONTINUED | OUTPATIENT
Start: 2019-11-11 | End: 2019-11-14

## 2019-11-11 RX ORDER — FUROSEMIDE 40 MG
80 TABLET ORAL DAILY
Refills: 0 | Status: DISCONTINUED | OUTPATIENT
Start: 2019-11-11 | End: 2019-11-14

## 2019-11-11 RX ORDER — ASCORBIC ACID 60 MG
500 TABLET,CHEWABLE ORAL DAILY
Refills: 0 | Status: DISCONTINUED | OUTPATIENT
Start: 2019-11-11 | End: 2019-11-14

## 2019-11-11 RX ORDER — ASPIRIN/CALCIUM CARB/MAGNESIUM 324 MG
81 TABLET ORAL DAILY
Refills: 0 | Status: DISCONTINUED | OUTPATIENT
Start: 2019-11-11 | End: 2019-11-11

## 2019-11-11 RX ORDER — TIOTROPIUM BROMIDE 18 UG/1
1 CAPSULE ORAL; RESPIRATORY (INHALATION) DAILY
Refills: 0 | Status: DISCONTINUED | OUTPATIENT
Start: 2019-11-11 | End: 2019-11-14

## 2019-11-11 RX ORDER — SEVELAMER CARBONATE 2400 MG/1
800 POWDER, FOR SUSPENSION ORAL
Refills: 0 | Status: DISCONTINUED | OUTPATIENT
Start: 2019-11-11 | End: 2019-11-14

## 2019-11-11 RX ORDER — METOPROLOL TARTRATE 50 MG
25 TABLET ORAL
Refills: 0 | Status: DISCONTINUED | OUTPATIENT
Start: 2019-11-11 | End: 2019-11-11

## 2019-11-11 RX ORDER — ATORVASTATIN CALCIUM 80 MG/1
40 TABLET, FILM COATED ORAL AT BEDTIME
Refills: 0 | Status: DISCONTINUED | OUTPATIENT
Start: 2019-11-11 | End: 2019-11-11

## 2019-11-11 RX ORDER — ACETAMINOPHEN 500 MG
650 TABLET ORAL EVERY 6 HOURS
Refills: 0 | Status: DISCONTINUED | OUTPATIENT
Start: 2019-11-11 | End: 2019-11-14

## 2019-11-11 RX ORDER — CLONAZEPAM 1 MG
0.5 TABLET ORAL EVERY 12 HOURS
Refills: 0 | Status: DISCONTINUED | OUTPATIENT
Start: 2019-11-11 | End: 2019-11-14

## 2019-11-11 RX ORDER — METHOCARBAMOL 500 MG/1
500 TABLET, FILM COATED ORAL EVERY 12 HOURS
Refills: 0 | Status: DISCONTINUED | OUTPATIENT
Start: 2019-11-11 | End: 2019-11-14

## 2019-11-11 RX ORDER — INSULIN LISPRO 100/ML
VIAL (ML) SUBCUTANEOUS
Refills: 0 | Status: DISCONTINUED | OUTPATIENT
Start: 2019-11-11 | End: 2019-11-11

## 2019-11-11 RX ORDER — FERROUS SULFATE 325(65) MG
325 TABLET ORAL DAILY
Refills: 0 | Status: DISCONTINUED | OUTPATIENT
Start: 2019-11-11 | End: 2019-11-14

## 2019-11-11 RX ORDER — TRAMADOL HYDROCHLORIDE 50 MG/1
50 TABLET ORAL EVERY 6 HOURS
Refills: 0 | Status: DISCONTINUED | OUTPATIENT
Start: 2019-11-11 | End: 2019-11-14

## 2019-11-11 RX ORDER — RISPERIDONE 4 MG/1
1 TABLET ORAL EVERY 12 HOURS
Refills: 0 | Status: DISCONTINUED | OUTPATIENT
Start: 2019-11-11 | End: 2019-11-14

## 2019-11-11 RX ORDER — ERYTHROPOIETIN 10000 [IU]/ML
4000 INJECTION, SOLUTION INTRAVENOUS; SUBCUTANEOUS
Refills: 0 | Status: DISCONTINUED | OUTPATIENT
Start: 2019-11-11 | End: 2019-11-14

## 2019-11-11 RX ORDER — CHLORHEXIDINE GLUCONATE 213 G/1000ML
1 SOLUTION TOPICAL
Refills: 0 | Status: DISCONTINUED | OUTPATIENT
Start: 2019-11-11 | End: 2019-11-14

## 2019-11-11 RX ORDER — SIMVASTATIN 20 MG/1
40 TABLET, FILM COATED ORAL AT BEDTIME
Refills: 0 | Status: DISCONTINUED | OUTPATIENT
Start: 2019-11-11 | End: 2019-11-14

## 2019-11-11 RX ORDER — INSULIN LISPRO 100/ML
15 VIAL (ML) SUBCUTANEOUS
Refills: 0 | Status: DISCONTINUED | OUTPATIENT
Start: 2019-11-11 | End: 2019-11-11

## 2019-11-11 RX ORDER — INSULIN LISPRO 100/ML
VIAL (ML) SUBCUTANEOUS
Refills: 0 | Status: DISCONTINUED | OUTPATIENT
Start: 2019-11-11 | End: 2019-11-14

## 2019-11-11 RX ORDER — OXYCODONE AND ACETAMINOPHEN 5; 325 MG/1; MG/1
1 TABLET ORAL ONCE
Refills: 0 | Status: DISCONTINUED | OUTPATIENT
Start: 2019-11-11 | End: 2019-11-11

## 2019-11-11 RX ORDER — HEPARIN SODIUM 5000 [USP'U]/ML
5000 INJECTION INTRAVENOUS; SUBCUTANEOUS EVERY 12 HOURS
Refills: 0 | Status: DISCONTINUED | OUTPATIENT
Start: 2019-11-11 | End: 2019-11-14

## 2019-11-11 RX ORDER — BUDESONIDE AND FORMOTEROL FUMARATE DIHYDRATE 160; 4.5 UG/1; UG/1
2 AEROSOL RESPIRATORY (INHALATION)
Refills: 0 | Status: DISCONTINUED | OUTPATIENT
Start: 2019-11-11 | End: 2019-11-14

## 2019-11-11 RX ORDER — PANTOPRAZOLE SODIUM 20 MG/1
40 TABLET, DELAYED RELEASE ORAL
Refills: 0 | Status: DISCONTINUED | OUTPATIENT
Start: 2019-11-11 | End: 2019-11-14

## 2019-11-11 RX ORDER — MIRTAZAPINE 45 MG/1
7.5 TABLET, ORALLY DISINTEGRATING ORAL DAILY
Refills: 0 | Status: DISCONTINUED | OUTPATIENT
Start: 2019-11-11 | End: 2019-11-14

## 2019-11-11 RX ORDER — CARVEDILOL PHOSPHATE 80 MG/1
6.25 CAPSULE, EXTENDED RELEASE ORAL EVERY 12 HOURS
Refills: 0 | Status: DISCONTINUED | OUTPATIENT
Start: 2019-11-11 | End: 2019-11-14

## 2019-11-11 RX ORDER — FOLIC ACID 0.8 MG
1 TABLET ORAL DAILY
Refills: 0 | Status: DISCONTINUED | OUTPATIENT
Start: 2019-11-11 | End: 2019-11-14

## 2019-11-11 RX ORDER — ERYTHROPOIETIN 10000 [IU]/ML
10000 INJECTION, SOLUTION INTRAVENOUS; SUBCUTANEOUS
Refills: 0 | Status: DISCONTINUED | OUTPATIENT
Start: 2019-11-11 | End: 2019-11-14

## 2019-11-11 RX ORDER — ALBUTEROL 90 UG/1
2 AEROSOL, METERED ORAL EVERY 6 HOURS
Refills: 0 | Status: DISCONTINUED | OUTPATIENT
Start: 2019-11-11 | End: 2019-11-14

## 2019-11-11 RX ADMIN — HEPARIN SODIUM 5000 UNIT(S): 5000 INJECTION INTRAVENOUS; SUBCUTANEOUS at 05:46

## 2019-11-11 RX ADMIN — MIRTAZAPINE 7.5 MILLIGRAM(S): 45 TABLET, ORALLY DISINTEGRATING ORAL at 17:39

## 2019-11-11 RX ADMIN — TRAMADOL HYDROCHLORIDE 50 MILLIGRAM(S): 50 TABLET ORAL at 22:10

## 2019-11-11 RX ADMIN — Medication 80 MILLIGRAM(S): at 05:48

## 2019-11-11 RX ADMIN — Medication 80 MILLIGRAM(S): at 03:29

## 2019-11-11 RX ADMIN — MORPHINE SULFATE 6 MILLIGRAM(S): 50 CAPSULE, EXTENDED RELEASE ORAL at 00:37

## 2019-11-11 RX ADMIN — Medication 0.5 MILLIGRAM(S): at 17:53

## 2019-11-11 RX ADMIN — CHLORHEXIDINE GLUCONATE 1 APPLICATION(S): 213 SOLUTION TOPICAL at 17:53

## 2019-11-11 RX ADMIN — SEVELAMER CARBONATE 800 MILLIGRAM(S): 2400 POWDER, FOR SUSPENSION ORAL at 17:41

## 2019-11-11 RX ADMIN — ALBUTEROL 2 PUFF(S): 90 AEROSOL, METERED ORAL at 21:41

## 2019-11-11 RX ADMIN — SIMVASTATIN 40 MILLIGRAM(S): 20 TABLET, FILM COATED ORAL at 21:40

## 2019-11-11 RX ADMIN — Medication 325 MILLIGRAM(S): at 17:37

## 2019-11-11 RX ADMIN — CARVEDILOL PHOSPHATE 6.25 MILLIGRAM(S): 80 CAPSULE, EXTENDED RELEASE ORAL at 17:38

## 2019-11-11 RX ADMIN — Medication 500 MILLIGRAM(S): at 17:42

## 2019-11-11 RX ADMIN — Medication 50 MILLIGRAM(S): at 21:40

## 2019-11-11 RX ADMIN — ERYTHROPOIETIN 10000 UNIT(S): 10000 INJECTION, SOLUTION INTRAVENOUS; SUBCUTANEOUS at 15:50

## 2019-11-11 RX ADMIN — TRAMADOL HYDROCHLORIDE 50 MILLIGRAM(S): 50 TABLET ORAL at 21:44

## 2019-11-11 RX ADMIN — RISPERIDONE 1 MILLIGRAM(S): 4 TABLET ORAL at 05:48

## 2019-11-11 RX ADMIN — RISPERIDONE 1 MILLIGRAM(S): 4 TABLET ORAL at 17:53

## 2019-11-11 RX ADMIN — TIOTROPIUM BROMIDE 1 CAPSULE(S): 18 CAPSULE ORAL; RESPIRATORY (INHALATION) at 17:41

## 2019-11-11 RX ADMIN — CLOPIDOGREL BISULFATE 75 MILLIGRAM(S): 75 TABLET, FILM COATED ORAL at 17:37

## 2019-11-11 RX ADMIN — Medication 50 MILLIGRAM(S): at 05:48

## 2019-11-11 RX ADMIN — CARVEDILOL PHOSPHATE 6.25 MILLIGRAM(S): 80 CAPSULE, EXTENDED RELEASE ORAL at 05:47

## 2019-11-11 RX ADMIN — TRAMADOL HYDROCHLORIDE 50 MILLIGRAM(S): 50 TABLET ORAL at 02:24

## 2019-11-11 RX ADMIN — Medication 20 MILLIGRAM(S): at 17:40

## 2019-11-11 RX ADMIN — PANTOPRAZOLE SODIUM 40 MILLIGRAM(S): 20 TABLET, DELAYED RELEASE ORAL at 05:47

## 2019-11-11 RX ADMIN — OXYCODONE AND ACETAMINOPHEN 1 TABLET(S): 5; 325 TABLET ORAL at 23:10

## 2019-11-11 RX ADMIN — HEPARIN SODIUM 5000 UNIT(S): 5000 INJECTION INTRAVENOUS; SUBCUTANEOUS at 17:53

## 2019-11-11 RX ADMIN — BUDESONIDE AND FORMOTEROL FUMARATE DIHYDRATE 2 PUFF(S): 160; 4.5 AEROSOL RESPIRATORY (INHALATION) at 21:40

## 2019-11-11 RX ADMIN — Medication 0.5 MILLIGRAM(S): at 05:45

## 2019-11-11 RX ADMIN — ERYTHROPOIETIN 4000 UNIT(S): 10000 INJECTION, SOLUTION INTRAVENOUS; SUBCUTANEOUS at 17:45

## 2019-11-11 RX ADMIN — Medication 1 MILLIGRAM(S): at 17:38

## 2019-11-11 NOTE — H&P ADULT - ATTENDING COMMENTS
Patient seen and examined ; case was discussed with the admitting resident    ROS: as in the HPI; all other ROS negative    SH and family history as above- FH significant for DM    Vital Signs Last 24 Hrs  T(C): 36.8 (10 Nov 2019 22:48), Max: 37 (10 Nov 2019 19:36)  T(F): 98.2 (10 Nov 2019 22:48), Max: 98.6 (10 Nov 2019 19:36)  HR: 78 (10 Nov 2019 22:48) (78 - 88)  BP: 160/90 (10 Nov 2019 22:48) (160/90 - 171/92)  BP(mean): --  RR: 18 (10 Nov 2019 22:48) (18 - 18)  SpO2: 100% (10 Nov 2019 22:48) (100% - 100%)    GEN: NAD  HEENT- normocephalic; mouth moist, nc in place   CVS- S1S2+  LUNGS- limited 2/2 habitus, breath sounds decreased, symmetric   ABD: Soft , nontender, obese nondistended, Bowel sounds are present  EXTREMITY: no calf tenderness, no cyanosis,++ edema, allodynia to light touch in the LE B/L   NEURO: AAOx3; non focal neurologic exam; cranial nerves grossly intact  PSYCH: normal affect and behavior  VASCULAR: + distal peripheral pulses  SKIN: warm and dry, tattoos       Labs Reviewed:                         8.9    9.72  )-----------( 314      ( 10 Nov 2019 22:19 )             30.0     11-10    137  |  97  |  62<H>  ----------------------------<  108<H>  4.4   |  30  |  8.17<H>    Ca    9.0      10 Nov 2019 22:19  Phos  6.2     11-10  Mg     2.6     11-10    TPro  8.0  /  Alb  2.8<L>  /  TBili  0.5  /  DBili  x   /  AST  13  /  ALT  16  /  AlkPhos  243<H>  11-10    CARDIAC MARKERS ( 10 Nov 2019 22:19 )  0.033 ng/mL / x     / x     / x     / x        CXR reviewed  EKG Reviewed  Prev hospitalizations reviewed   Ambulatory documents reviewed      40 y/o M with chronic hypoxic respiratory failure, ESRD on HD MWF, HFpEF, schizophrenia, cocaine abuse, chronic pain, medical noncompliance, morbid obesity, KRYSTIN, admitted with worsening fluid overload and chest pain. Last cocaine use was last Wednesday, he states he only uses when his leg pain becomes unbearable.     1. Acute on chronic diastolic heart failure- likely due to noncompliance with diet, medications, cocaine abuse. Most recent echo reviewed with preserved EF, give IV lasix, monitor on tele short term, trend troponin, salt restriction, appreciate cardiology consultation.   2. Chest pain- atypical, cardiac eval as above, will defer repeat echo given recent study   3. Chronic hypoxic respiratory failure- on home O2 requirements, per last pulmonology note patient with PFTs c/w restrictive lung disease and decreased dlco, cont symbicort, patient unsure what inhalers he is taking, copd dx unclear   4. KRYSTIN- needs outpatient study for titration   5. Medical noncompliance- patient counseled   6. Schizophrenia, BPD- continue Risperdal, fluoxetine   7. Fluid overload   8. Chronic pain- reports legs with stabbing pain from thigh to feet b/l from swelling. Volume optimization as above, robaxin and tramadol as recommended on previous admissions. Patient known to pain service, avoid narcotics if possible.   9. ESRD on HD- appreciate nephrology consultation, no need for urgent HD tonight but will be due in the AM   10. Cocaine abuse- monitor for withdrawal  11. Anxiety disorder- continue anxiolytic regimen, recently seen by psychiatry on most recent admission   12. Morbid obesity- bmi>40, recently seen by bariatric surgery team     Plan of care discussed with patient ;  all questions and concerns were addressed.

## 2019-11-11 NOTE — ED ADULT NURSE NOTE - ED STAT RN HANDOFF DETAILS
pt.remained stable.denies  pain. transfer to [facility] report given to yisel narvaez.not in distress

## 2019-11-11 NOTE — CONSULT NOTE ADULT - SUBJECTIVE AND OBJECTIVE BOX
[facility] : Progress Note :: [phone], (office [phone]),   Dr La / Dr Hui / Dr Barber / Dr Villalobos / Dr Hang CASTELLANO / Dr Mehta / Dr Sanchez / Dr Alber dumont  _____________________________________________________________________________________________  Patient is a 39y Male whom presented to the hospital with chest pain. He has ESRD on dialysis on HD at [facility]. Recently has been compliant with HD schedule. presented to HD with chest pain at rest and while leaning forward. He continues to use cocaine on a regular basis. remains with high intradialytic fluid gains. renal consulted for ESRD.    PAST MEDICAL & SURGICAL HISTORY:  COPD (chronic obstructive pulmonary disease)  Migraine  HTN (hypertension)  DM (diabetes mellitus)  Bipolar disorder  Schizophrenia  ESRD on dialysis  Morbid obesity with BMI of 40.0-44.9, adult  H/O hernia repair    aspirin (Short breath)  aspirin (Swelling)  fish (Unknown)  penicillin (Short breath)  penicillins (Swelling)  shellfish (Unknown)    Home Medications Reviewed  Hospital Medications:   MEDICATIONS  (STANDING):  ALBUTerol    90 MICROgram(s) HFA Inhaler 2 Puff(s) Inhalation every 6 hours  ascorbic acid 500 milliGRAM(s) Oral daily  budesonide  80 MICROgram(s)/formoterol 4.5 MICROgram(s) Inhaler 2 Puff(s) Inhalation two times a day  carvedilol 6.25 milliGRAM(s) Oral every 12 hours  chlorhexidine 4% Liquid 1 Application(s) Topical <User Schedule>  clonazePAM  Tablet 0.5 milliGRAM(s) Oral every 12 hours  clopidogrel Tablet 75 milliGRAM(s) Oral daily  epoetin cyndie Injectable 89560 Unit(s) IV Push <User Schedule>  epoetin cyndie Injectable 4000 Unit(s) SubCutaneous <User Schedule>  ferrous    sulfate 325 milliGRAM(s) Oral daily  folic acid 1 milliGRAM(s) Oral daily  furosemide    Tablet 80 milliGRAM(s) Oral daily  heparin  Injectable 5000 Unit(s) SubCutaneous every 12 hours  hydrALAZINE 50 milliGRAM(s) Oral three times a day  insulin lispro (HumaLOG) corrective regimen sliding scale   SubCutaneous three times a day before meals  mirtazapine 7.5 milliGRAM(s) Oral daily  pantoprazole    Tablet 40 milliGRAM(s) Oral before breakfast  PARoxetine 20 milliGRAM(s) Oral daily  risperiDONE   Tablet 1 milliGRAM(s) Oral every 12 hours  sevelamer carbonate 800 milliGRAM(s) Oral three times a day with meals  simvastatin 40 milliGRAM(s) Oral at bedtime  tiotropium 18 MICROgram(s) Capsule 1 Capsule(s) Inhalation daily    SOCIAL HISTORY:  Denies ETOh,Smoking,   FAMILY HISTORY:  Family history of COPD (chronic obstructive pulmonary disease)  Family history of diabetes mellitus        VITALS:  T(F): 97.8 (11-11-19 @ 13:30), Max: 98.6 (11-10-19 @ 19:36)  HR: 76 (11-11-19 @ 13:30)  BP: 131/81 (11-11-19 @ 13:30)  RR: 17 (11-11-19 @ 13:30)  SpO2: 100% (11-11-19 @ 13:30)  Wt(kg): --      Weight (kg): 204 (11-11 @ 01:46)  PHYSICAL EXAM:  Constitutional: NAD  HEENT: anicteric sclera, oropharynx clear, MMM  Neck: No JVD  Respiratory: CTAB, no wheezes, rales or rhonchi  Cardiovascular: S1, S2, RRR  Gastrointestinal: BS+, soft, NT/ND  Extremities:  peripheral edema++  Neurological: A/O x 3, no focal deficits  : No CVA tenderness. No hernandez.   Vascular Access: AVF WITH THRILL AND BRUIT    LABS:  11-11    137  |  94<L>  |  66<H>  ----------------------------<  102<H>  4.6   |  31  |  8.68<H>    Ca    9.2      11 Nov 2019 07:31  Phos  7.3     11-11  Mg     2.5     11-11    TPro  7.8  /  Alb  2.7<L>  /  TBili  0.6  /  DBili      /  AST  14  /  ALT  17  /  AlkPhos  227<H>  11-11    Creatinine Trend: 8.68 <--, 8.17 <--                        8.7    8.61  )-----------( 286      ( 11 Nov 2019 07:31 )             30.0     Urine Studies:      RADIOLOGY & ADDITIONAL STUDIES:

## 2019-11-11 NOTE — CONSULT NOTE ADULT - ASSESSMENT
39 Male with history of Diastolic heart failure, ESRD on dialysis, COPD on home oxygen, Obesity, Bipolar disorder and substance abuse came to hospital for chest pain. 39 Male with history of Diastolic heart failure, ESRD on dialysis, COPD on home oxygen, Obesity, Bipolar disorder and substance abuse came to hospital for chest pain.     1) Atypical Angina.  He has non-specific chest pain without exertional symptoms. EKG and cardiac enzymes are normal. He also uses cocaine which can exacerbate anginal symptoms transiently. Never had cardiac work up done before. In view of his pulmonary congestion and ESRD, his BNP is normal. Would recommend rechecking it. Can also check ESR, CRP as his symptoms more point towards pericarditis with pain getting worse upon laying flat but non-significant EKG. No need of inpatient ischemic evaluation. If his symptoms persist, he can see cardiologist after discharge for further testing.     2) History of hypertension.   Currently blood pressure in acceptable range. Would recommend adding Imdur in addition to hydralazine. Continue Lasix and coreg.     3) Grade 3 Diastolic heart failure.  In view of his normal BNP, symptoms more point towards pulmonary edema coming from ESRD. He will need dietary education and compliance reinforcement for his chronic pedal edema.

## 2019-11-11 NOTE — H&P ADULT - ASSESSMENT
Pt is a 39 years old male from home, ambulates with cane with PMHx of ESRD on MWF HD (via LUE AVF), DM (on insulin), cocaine abuse, COPD on home O2 (41/2L NC), morbid obesity, Bipolar disorder, schizophrenia and HTN, former smoker with 30 pack history came to ED today for chest pain. Pt is admitted to Telemetry to r/o ACS.

## 2019-11-11 NOTE — H&P ADULT - HISTORY OF PRESENT ILLNESS
Pt is a 39 years old male from home, ambulates with cane with PMHx of ESRD on MWF HD (via LUE AVF), DM (on insulin), cocaine abuse, COPD on home O2 (41/2L NC), morbid obesity, Bipolar disorder, schizophrenia and HTN, former smoker with 30 pack history came to ED today for chest pain. Pt states substernal, 10/10, dull, squeezing, pressure like pain,, lasted for few minutes, aggravated with exercise and relieved with Rest, pain medication. It is a/w sob, bilateral leg swelling, Nausea,  orthopnea is present uses 3 pillows and unable to lay flat, he feels like drowning on laying down. He can walk 1/2 a block, and need to rest for 5-6mins, cannot climb stairs. It is relieved with oxygen and inhalers. Dr Mendez is his pulmonologist and visited 2 weeks back. He was given inhalers, allergy test was done. On Wednesday he had palpitations, checked his blood pressure 265/119 on the wrist and 180/119 on the arm. He was also breathing fast gasping for breath and took inhalers with minimal relief. He is non compliant with medications due to memory loss. Pt denies PND, Presyncope, diaphoresis, vision changes, headache, dysuria, pleuritic pain, positional pain, sick contacts, recent travel, N/V/D, Fever, chills.  GOC Full code

## 2019-11-11 NOTE — CONSULT NOTE ADULT - ASSESSMENT
# end stage renal disease. he is profoundly. fluid overloaded. ultrafiltration as tolerated. will consider HD in AM   # atypical chest pain setting of ongoing cocaine use/abuse. seen by cardiology  #HTN controlled  # anemia of CKD. cont procrit  # schizophrenia. cont current medications    Many thanks for the consultation

## 2019-11-11 NOTE — H&P ADULT - NSHPLABSRESULTS_GEN_ALL_CORE
LABS:                        8.9    9.72  )-----------( 314      ( 10 Nov 2019 22:19 )             30.0     11-10    137  |  97  |  62<H>  ----------------------------<  108<H>  4.4   |  30  |  8.17<H>    Ca    9.0      10 Nov 2019 22:19  Phos  6.2     11-10  Mg     2.6     11-10    TPro  8.0  /  Alb  2.8<L>  /  TBili  0.5  /  DBili  x   /  AST  13  /  ALT  16  /  AlkPhos  243<H>  11-10        LIVER FUNCTIONS - ( 10 Nov 2019 22:19 )  Alb: 2.8 g/dL / Pro: 8.0 g/dL / ALK PHOS: 243 U/L / ALT: 16 U/L DA / AST: 13 U/L / GGT: x

## 2019-11-11 NOTE — H&P ADULT - PROBLEM SELECTOR PLAN 5
Pt has hx of DM   HbA1C was 5.8 in oct   -he takes Humalog 15 units at home and is on hold for now as the blood sugar is well controlled and Hba1c is controlled.  -C/w hss.  pt is not taking Gabapentin as it is not helping.

## 2019-11-11 NOTE — H&P ADULT - NSHPREVIEWOFSYSTEMS_GEN_ALL_CORE
REVIEW OF SYSTEMS:    CONSTITUTIONAL: No weakness, fevers or chills  EYES/ENT: No visual changes;  No vertigo or throat pain   NECK: No pain or stiffness  RESPIRATORY: No cough, wheezing, hemoptysis; but shortness of breath is present   CARDIOVASCULAR:  chest pain but no  palpitations  GASTROINTESTINAL: No abdominal or epigastric pain. No nausea, vomiting, or hematemesis; No diarrhea or constipation. No melena or hematochezia.  GENITOURINARY: No dysuria, frequency or hematuria  NEUROLOGICAL: No numbness or weakness  SKIN: No itching, burning, rashes, or lesions   Bilateral leg swelling is present   All other review of systems is negative unless indicated above.

## 2019-11-11 NOTE — H&P ADULT - PROBLEM SELECTOR PLAN 1
Pt presnted with chest pain  likely  cardiac, but given Hx of, HTN, Smoking, Diabetes,  Dyslipidemia, Obesity, male  sex will admit to telemetry to r/o ACS.  HEART score 3 points, low risk, Risk of MACE is 0.9-1.7%  ALEXIS score 1 point, 5% all cause mortality risk  EKG- no ST T wave changes.  cardiac enzymes X 1 negative.  will trend T2, T3  ECHO done on september showed Grade 3 DD and moderate pulmonary hypertension and not repeating again.  Pt is allergic to aspirin and c/w plavix   c/w Methocarbamol   cardio consult- Dr Galeana

## 2019-11-11 NOTE — PATIENT PROFILE ADULT - INFLUENZA IMMUNIZATION DATE (APPROXIMATE)
[facility]    BH Treatment Plan Acknowledgement    Iman Matamoros was involved in the treatment plan for this current admission, 11/6/2019.  Patient has seen and agrees to the Interdisciplinary Treatment Plan.  Iman Matamoros verbalizes that he/she will work with the treatment team to meet the Interdisciplinary Treatment Plan goals.    X____________________________________    Date/Time: _____________________    Patient/Legally Authorized Representative  X____________________________________    Date/Time: _____________________    Treatment Team Member   X____________________________________    Date/Time: _____________________       X____________________________________    Date/Time: _____________________    Patient/Legally Authorized Representative  X____________________________________    Date/Time: _____________________    Treatment Team Member  X____________________________________    Date/Time: _____________________       X____________________________________    Date/Time: _____________________    Patient/Legally Authorized Representative  X____________________________________    Date/Time: _____________________    Treatment Team Member  X____________________________________    Date/Time: _____________________         I have reviewed and agree with the proposed treatment plan that the treatment team and the patient have defined.    X____________________________________    Date/Time: _____________________    MD Signature (for Inpatient/Residential encounters only)    I was notified of my drug test results.    X____________________________________    Date/Time: _____________________    Patient/Legally Authorized Representative  X____________________________________    Date/Time: _____________________    Patient/Legally Authorized Representative    [id number]    
Adult Mental Health Inpatient Program  Interdisciplinary Treatment Plan    Iman Matamoros  MRN:[id number]   :1992    2019    Luzma Knapp, CARMEN   
Adult Mental Health Inpatient Program  Interdisciplinary Treatment Plan    Iman Matamoros  MRN:[id number]   :1992    2019    Diagnosis: Major Depressive Disorder    Plan Type: Discharge Plan    Your patient, Iman Matamoros 11/10/19. Completed 19.    Interventions:  Therapy Interventions: Encourage group attendance; complete safety plan    Plan Update: Pt has been attending groups on the unit and completed safety plan with therapist. Pt to DC and follow up with [facility] starting 19.    Luzma Knapp, LPC   
23-Nov-2019

## 2019-11-11 NOTE — ED ADULT NURSE NOTE - CHIEF COMPLAINT QUOTE
I have shortness of breath, dyspnea on exertion and leg pain after walking over [facility]

## 2019-11-11 NOTE — H&P ADULT - PROBLEM SELECTOR PLAN 6
Holding home medications Amlodipine due to Bilateral leg swelling  -Currently on Hydralazine, Lopressor   DASH diet   -Continue to monitor

## 2019-11-11 NOTE — CONSULT NOTE ADULT - ATTENDING COMMENTS
Thank you for the courtesy of a consultation, please contact me for any additional questions    Patient examined 11/12. No clinical suspicion for ischemic etiology as per resident note above.

## 2019-11-11 NOTE — CONSULT NOTE ADULT - SUBJECTIVE AND OBJECTIVE BOX
CHIEF COMPLAINT: Chest pain    HPI: 39 Male with history of Diastolic heart failure, ESRD on dialysis, COPD on home oxygen, Obesity, Bipolar disorder and substance abuse came to hospital for chest pain for few days. His pain is mild substernal without any radiation. Gets worse with laying flat and feels better with leaning forward. He had his last hemodialysis on Friday. Pt states he uses cocaine when he feels pain in his legs and last use was on Tuesday. Denies any family history of CAD. No shortness of breath, abdominal pain, problems with urine or bowel.     PAST MEDICAL & SURGICAL HISTORY:  COPD (chronic obstructive pulmonary disease)  Migraine  HTN (hypertension)  DM (diabetes mellitus)  Bipolar disorder  Schizophrenia  ESRD on dialysis  Morbid obesity with BMI of 40.0-44.9, adult  H/O hernia repair      Allergies  aspirin (Short breath)  aspirin (Swelling)  fish (Unknown)  penicillin (Short breath)  penicillins (Swelling)  shellfish (Unknown)      MEDICATIONS  (STANDING):  ALBUTerol    90 MICROgram(s) HFA Inhaler 2 Puff(s) Inhalation every 6 hours  ascorbic acid 500 milliGRAM(s) Oral daily  budesonide  80 MICROgram(s)/formoterol 4.5 MICROgram(s) Inhaler 2 Puff(s) Inhalation two times a day  carvedilol 6.25 milliGRAM(s) Oral every 12 hours  clonazePAM  Tablet 0.5 milliGRAM(s) Oral every 12 hours  clopidogrel Tablet 75 milliGRAM(s) Oral daily  epoetin cyndie Injectable 12171 Unit(s) IV Push <User Schedule>  epoetin cyndie Injectable 4000 Unit(s) SubCutaneous <User Schedule>  ferrous    sulfate 325 milliGRAM(s) Oral daily  folic acid 1 milliGRAM(s) Oral daily  furosemide    Tablet 80 milliGRAM(s) Oral daily  heparin  Injectable 5000 Unit(s) SubCutaneous every 12 hours  hydrALAZINE 50 milliGRAM(s) Oral three times a day  insulin lispro (HumaLOG) corrective regimen sliding scale   SubCutaneous three times a day before meals  mirtazapine 7.5 milliGRAM(s) Oral daily  pantoprazole    Tablet 40 milliGRAM(s) Oral before breakfast  PARoxetine 20 milliGRAM(s) Oral daily  risperiDONE   Tablet 1 milliGRAM(s) Oral every 12 hours  sevelamer carbonate 800 milliGRAM(s) Oral three times a day with meals  simvastatin 40 milliGRAM(s) Oral at bedtime  tiotropium 18 MICROgram(s) Capsule 1 Capsule(s) Inhalation daily    MEDICATIONS  (PRN):  acetaminophen   Tablet .. 650 milliGRAM(s) Oral every 6 hours PRN Moderate Pain (4 - 6)  acetaminophen   Tablet .. 650 milliGRAM(s) Oral every 6 hours PRN Mild Pain (1 - 3)  methocarbamol 500 milliGRAM(s) Oral every 12 hours PRN spasms  traMADol 50 milliGRAM(s) Oral every 6 hours PRN Severe Pain (7 - 10)      FAMILY HISTORY:  Family history of COPD (chronic obstructive pulmonary disease)  Family history of diabetes mellitus  No family history of premature coronary artery disease or sudden cardiac death    SOCIAL HISTORY:  Smoking- No  Alcohol- No  Illicit Drug use- Yes, Cocaine     REVIEW OF SYSTEMS:  CONSTITUTIONAL: Mild weakness, No fevers or chills  EYES/ENT: No visual changes;  No vertigo or throat pain   NECK: No pain or stiffness  RESPIRATORY: No cough, wheezing, hemoptysis; No shortness of breath  CARDIOVASCULAR: Substernal chest pain  GASTROINTESTINAL: No abdominal or epigastric pain  GENITOURINARY: No dysuria, frequency or hematuria  NEUROLOGICAL: Legs pain   SKIN: No itching, burning, rashes, or lesions   All other review of systems is negative unless indicated above.      Vital Signs Last 24 Hrs  T(C): 36.2 (11 Nov 2019 08:18), Max: 37 (10 Nov 2019 19:36)  T(F): 97.1 (11 Nov 2019 08:18), Max: 98.6 (10 Nov 2019 19:36)  HR: 71 (11 Nov 2019 08:18) (71 - 88)  BP: 132/73 (11 Nov 2019 08:18) (113/76 - 171/92)  BP(mean): --  RR: 18 (11 Nov 2019 08:18) (17 - 18)  SpO2: 99% (11 Nov 2019 08:18) (95% - 100%)  I&O's Summary      PHYSICAL EXAM:  GENERAL: Well developed, Young obese male, sleeping comfortably   HEENT:  Normocephalic/Atraumatic, reactive light reflex, moist mucous membranes  NECK: Supple, no JVD  RESP: Symmetric movement of the chest, mild bilateral coarse rales.   CVS: S1 and S2 audible  GI: Normal active bowel sounds present, abdomen soft, non tender, non distended  EXTREMITIES:  2+ edema, no clubbing, cyanosis, +LUE fistula   MSK: 5/5 strength bilateral upper and lower extremities, intact sensations to light & crude touch  PSYCH: Normal mood, normal affect observed  NEURO: Alert and oriented x 3      LABS:  11-11    137  |  94<L>  |  66<H>  ----------------------------<  102<H>  4.6   |  31  |  8.68<H>    Ca    9.2      11 Nov 2019 07:31  Phos  7.3     11-11  Mg     2.5     11-11    TPro  7.8  /  Alb  2.7<L>  /  TBili  0.6  /  DBili  x   /  AST  14  /  ALT  17  /  AlkPhos  227<H>  11-11    Creatinine Trend: 8.68<--, 8.17<--, 7.96<--, 7.06<--, 8.46<--, 8.22<--                        8.7    8.61  )-----------( 286      ( 11 Nov 2019 07:31 )             30.0         Lipid Panel:   Cardiac Enzymes: CARDIAC MARKERS ( 10 Nov 2019 22:19 )  0.033 ng/mL / x     / x     / x     / x          Serum Pro-Brain Natriuretic Peptide: 44 pg/mL (11-10-19 @ 22:19)    10-25 [id number] 5.3      RADIOLOGY: Bilateral pulmonary edema.    ECG [my interpretation]:    TELEMETRY:    ECHO: Grade 3 Diastolic dysfunction, normal systolic functions.    STRESS TEST: N/A    CATHETERIZATION: N/A CHIEF COMPLAINT: Chest pain    HPI: 39 Male with history of Diastolic heart failure, ESRD on dialysis, COPD on home oxygen, Obesity, Bipolar disorder and substance abuse came to hospital for chest pain for few days. His pain is mild substernal without any radiation. Gets worse with laying flat and feels better with leaning forward. He had his last hemodialysis on Friday. Pt states he uses cocaine when he feels pain in his legs and last use was on Tuesday. Denies any family history of CAD. No shortness of breath, abdominal pain, problems with urine or bowel.     PAST MEDICAL & SURGICAL HISTORY:  COPD (chronic obstructive pulmonary disease)  Migraine  HTN (hypertension)  DM (diabetes mellitus)  Bipolar disorder  Schizophrenia  ESRD on dialysis  Morbid obesity with BMI of 40.0-44.9, adult  H/O hernia repair      Allergies  aspirin (Short breath)  aspirin (Swelling)  fish (Unknown)  penicillin (Short breath)  penicillins (Swelling)  shellfish (Unknown)      MEDICATIONS  (STANDING):  ALBUTerol    90 MICROgram(s) HFA Inhaler 2 Puff(s) Inhalation every 6 hours  ascorbic acid 500 milliGRAM(s) Oral daily  budesonide  80 MICROgram(s)/formoterol 4.5 MICROgram(s) Inhaler 2 Puff(s) Inhalation two times a day  carvedilol 6.25 milliGRAM(s) Oral every 12 hours  clonazePAM  Tablet 0.5 milliGRAM(s) Oral every 12 hours  clopidogrel Tablet 75 milliGRAM(s) Oral daily  epoetin cyndie Injectable 16892 Unit(s) IV Push <User Schedule>  epoetin cyndie Injectable 4000 Unit(s) SubCutaneous <User Schedule>  ferrous    sulfate 325 milliGRAM(s) Oral daily  folic acid 1 milliGRAM(s) Oral daily  furosemide    Tablet 80 milliGRAM(s) Oral daily  heparin  Injectable 5000 Unit(s) SubCutaneous every 12 hours  hydrALAZINE 50 milliGRAM(s) Oral three times a day  insulin lispro (HumaLOG) corrective regimen sliding scale   SubCutaneous three times a day before meals  mirtazapine 7.5 milliGRAM(s) Oral daily  pantoprazole    Tablet 40 milliGRAM(s) Oral before breakfast  PARoxetine 20 milliGRAM(s) Oral daily  risperiDONE   Tablet 1 milliGRAM(s) Oral every 12 hours  sevelamer carbonate 800 milliGRAM(s) Oral three times a day with meals  simvastatin 40 milliGRAM(s) Oral at bedtime  tiotropium 18 MICROgram(s) Capsule 1 Capsule(s) Inhalation daily    MEDICATIONS  (PRN):  acetaminophen   Tablet .. 650 milliGRAM(s) Oral every 6 hours PRN Moderate Pain (4 - 6)  acetaminophen   Tablet .. 650 milliGRAM(s) Oral every 6 hours PRN Mild Pain (1 - 3)  methocarbamol 500 milliGRAM(s) Oral every 12 hours PRN spasms  traMADol 50 milliGRAM(s) Oral every 6 hours PRN Severe Pain (7 - 10)      FAMILY HISTORY:  Family history of COPD (chronic obstructive pulmonary disease)  Family history of diabetes mellitus  No family history of premature coronary artery disease or sudden cardiac death    SOCIAL HISTORY:  Smoking- No  Alcohol- No  Illicit Drug use- Yes, Cocaine     REVIEW OF SYSTEMS:  CONSTITUTIONAL: Mild weakness, No fevers or chills  EYES/ENT: No visual changes;  No vertigo or throat pain   NECK: No pain or stiffness  RESPIRATORY: No cough, wheezing, hemoptysis; No shortness of breath  CARDIOVASCULAR: Substernal chest pain  GASTROINTESTINAL: No abdominal or epigastric pain  GENITOURINARY: No dysuria, frequency or hematuria  NEUROLOGICAL: Legs pain   SKIN: No itching, burning, rashes, or lesions   All other review of systems is negative unless indicated above.      Vital Signs Last 24 Hrs  T(C): 36.2 (11 Nov 2019 08:18), Max: 37 (10 Nov 2019 19:36)  T(F): 97.1 (11 Nov 2019 08:18), Max: 98.6 (10 Nov 2019 19:36)  HR: 71 (11 Nov 2019 08:18) (71 - 88)  BP: 132/73 (11 Nov 2019 08:18) (113/76 - 171/92)  BP(mean): --  RR: 18 (11 Nov 2019 08:18) (17 - 18)  SpO2: 99% (11 Nov 2019 08:18) (95% - 100%)  I&O's Summary      PHYSICAL EXAM:  GENERAL: Well developed, Young obese male, sleeping comfortably   HEENT:  Normocephalic/Atraumatic, reactive light reflex, moist mucous membranes  NECK: Supple, no JVD  RESP: Symmetric movement of the chest, mild bilateral coarse rales.   CVS: S1 and S2 audible  GI: Normal active bowel sounds present, abdomen soft, non tender, non distended  EXTREMITIES:  2+ edema, no clubbing, cyanosis, +LUE fistula   MSK: 5/5 strength bilateral upper and lower extremities, intact sensations to light & crude touch  PSYCH: Normal mood, normal affect observed  NEURO: Alert and oriented x 3      LABS:  11-11    137  |  94<L>  |  66<H>  ----------------------------<  102<H>  4.6   |  31  |  8.68<H>    Ca    9.2      11 Nov 2019 07:31  Phos  7.3     11-11  Mg     2.5     11-11    TPro  7.8  /  Alb  2.7<L>  /  TBili  0.6  /  DBili  x   /  AST  14  /  ALT  17  /  AlkPhos  227<H>  11-11    Creatinine Trend: 8.68<--, 8.17<--, 7.96<--, 7.06<--, 8.46<--, 8.22<--                        8.7    8.61  )-----------( 286      ( 11 Nov 2019 07:31 )             30.0         Lipid Panel:   Cardiac Enzymes: CARDIAC MARKERS ( 10 Nov 2019 22:19 )  0.033 ng/mL / x     / x     / x     / x          Serum Pro-Brain Natriuretic Peptide: 44 pg/mL (11-10-19 @ 22:19)    10-25 [id number] 5.3      RADIOLOGY: Bilateral pulmonary edema.    ECG [my interpretation]:    TELEMETRY: No events.     ECHO: Grade 3 Diastolic dysfunction, normal systolic functions.    STRESS TEST: N/A    CATHETERIZATION: N/A CHIEF COMPLAINT: Chest pain    HPI: 39 Male with history of Diastolic heart failure, ESRD on dialysis, COPD on home oxygen, Obesity, Bipolar disorder and substance abuse came to hospital for chest pain for few days. His pain is mild, substernal without any radiation. Gets worse with laying flat and feels better with leaning forward. He had his last hemodialysis on Friday. Pt states he uses cocaine when he feels pain in his legs and last use was on Tuesday. Denies any family history of CAD. No shortness of breath, abdominal pain, problems with urine or bowel.     PAST MEDICAL & SURGICAL HISTORY:  COPD (chronic obstructive pulmonary disease)  Migraine  HTN (hypertension)  DM (diabetes mellitus)  Bipolar disorder  Schizophrenia  ESRD on dialysis  Morbid obesity with BMI of 40.0-44.9, adult  H/O hernia repair      Allergies  aspirin (Short breath)  aspirin (Swelling)  fish (Unknown)  penicillin (Short breath)  penicillins (Swelling)  shellfish (Unknown)      MEDICATIONS  (STANDING):  ALBUTerol    90 MICROgram(s) HFA Inhaler 2 Puff(s) Inhalation every 6 hours  ascorbic acid 500 milliGRAM(s) Oral daily  budesonide  80 MICROgram(s)/formoterol 4.5 MICROgram(s) Inhaler 2 Puff(s) Inhalation two times a day  carvedilol 6.25 milliGRAM(s) Oral every 12 hours  clonazePAM  Tablet 0.5 milliGRAM(s) Oral every 12 hours  clopidogrel Tablet 75 milliGRAM(s) Oral daily  epoetin cyndie Injectable 29189 Unit(s) IV Push <User Schedule>  epoetin cyndie Injectable 4000 Unit(s) SubCutaneous <User Schedule>  ferrous    sulfate 325 milliGRAM(s) Oral daily  folic acid 1 milliGRAM(s) Oral daily  furosemide    Tablet 80 milliGRAM(s) Oral daily  heparin  Injectable 5000 Unit(s) SubCutaneous every 12 hours  hydrALAZINE 50 milliGRAM(s) Oral three times a day  insulin lispro (HumaLOG) corrective regimen sliding scale   SubCutaneous three times a day before meals  mirtazapine 7.5 milliGRAM(s) Oral daily  pantoprazole    Tablet 40 milliGRAM(s) Oral before breakfast  PARoxetine 20 milliGRAM(s) Oral daily  risperiDONE   Tablet 1 milliGRAM(s) Oral every 12 hours  sevelamer carbonate 800 milliGRAM(s) Oral three times a day with meals  simvastatin 40 milliGRAM(s) Oral at bedtime  tiotropium 18 MICROgram(s) Capsule 1 Capsule(s) Inhalation daily    MEDICATIONS  (PRN):  acetaminophen   Tablet .. 650 milliGRAM(s) Oral every 6 hours PRN Moderate Pain (4 - 6)  acetaminophen   Tablet .. 650 milliGRAM(s) Oral every 6 hours PRN Mild Pain (1 - 3)  methocarbamol 500 milliGRAM(s) Oral every 12 hours PRN spasms  traMADol 50 milliGRAM(s) Oral every 6 hours PRN Severe Pain (7 - 10)      FAMILY HISTORY:  Family history of COPD (chronic obstructive pulmonary disease)  Family history of diabetes mellitus  No family history of premature coronary artery disease or sudden cardiac death    SOCIAL HISTORY:  Smoking- No  Alcohol- No  Illicit Drug use- Yes, Cocaine     REVIEW OF SYSTEMS:  CONSTITUTIONAL: Mild weakness, No fevers or chills  EYES/ENT: No visual changes;  No vertigo or throat pain   NECK: No pain or stiffness  RESPIRATORY: No cough, wheezing, hemoptysis; No shortness of breath  CARDIOVASCULAR: Substernal chest pain  GASTROINTESTINAL: No abdominal or epigastric pain  GENITOURINARY: No dysuria, frequency or hematuria  NEUROLOGICAL: Legs pain   SKIN: No itching, burning, rashes, or lesions   All other review of systems is negative unless indicated above.      Vital Signs Last 24 Hrs  T(C): 36.2 (11 Nov 2019 08:18), Max: 37 (10 Nov 2019 19:36)  T(F): 97.1 (11 Nov 2019 08:18), Max: 98.6 (10 Nov 2019 19:36)  HR: 71 (11 Nov 2019 08:18) (71 - 88)  BP: 132/73 (11 Nov 2019 08:18) (113/76 - 171/92)  BP(mean): --  RR: 18 (11 Nov 2019 08:18) (17 - 18)  SpO2: 99% (11 Nov 2019 08:18) (95% - 100%)  I&O's Summary      PHYSICAL EXAM:  GENERAL: Well developed, Young obese male, sleeping comfortably   HEENT:  Normocephalic/Atraumatic, reactive light reflex, moist mucous membranes  NECK: Supple, no JVD  RESP: Symmetric movement of the chest, mild bilateral coarse rales.   CVS: S1 and S2 audible  GI: Normal active bowel sounds present, abdomen soft, non tender, non distended  EXTREMITIES:  2+ edema, no clubbing, cyanosis, +LUE fistula   MSK: 5/5 strength bilateral upper and lower extremities, intact sensations to light & crude touch  PSYCH: Normal mood, normal affect observed  NEURO: Alert and oriented x 3      LABS:  11-11    137  |  94<L>  |  66<H>  ----------------------------<  102<H>  4.6   |  31  |  8.68<H>    Ca    9.2      11 Nov 2019 07:31  Phos  7.3     11-11  Mg     2.5     11-11    TPro  7.8  /  Alb  2.7<L>  /  TBili  0.6  /  DBili  x   /  AST  14  /  ALT  17  /  AlkPhos  227<H>  11-11    Creatinine Trend: 8.68<--, 8.17<--, 7.96<--, 7.06<--, 8.46<--, 8.22<--                        8.7    8.61  )-----------( 286      ( 11 Nov 2019 07:31 )             30.0         Lipid Panel:   Cardiac Enzymes: CARDIAC MARKERS ( 10 Nov 2019 22:19 )  0.033 ng/mL / x     / x     / x     / x          Serum Pro-Brain Natriuretic Peptide: 44 pg/mL (11-10-19 @ 22:19)    10-25 [id number] 5.3      RADIOLOGY: Bilateral pulmonary edema.    ECG [my interpretation]: Normal sinus.     TELEMETRY: No events.     ECHO: Grade 3 Diastolic dysfunction, normal systolic functions.    STRESS TEST: N/A    CATHETERIZATION: N/A

## 2019-11-11 NOTE — H&P ADULT - NSHPSOCIALHISTORY_GEN_ALL_CORE
Pt quit smoking 6 months back and he smoked 2ppd for 15 yrs, he was taking cocaine since 1 yr and last intake was on Tuesday. Denies drinking Alcohol.

## 2019-11-11 NOTE — ED ADULT NURSE NOTE - OBJECTIVE STATEMENT
presents to ed c/o SObyear old male, ESRD on dialysis, morbidly obeses, HTN, multiple ED visits, has had 3 admissions within a month, complains of pain on every admission.    	Conerns are   	1)opiod use disorder  	2) complex psycho spcial needs  	3)violent out bursts and making threats to staff  	4) non compliance to medical treatment presents to ed c/o SOb & difficulty of walking .bld.drawn and sent to lab hx. opiod use disorder. htn

## 2019-11-11 NOTE — H&P ADULT - PROBLEM SELECTOR PLAN 10
IMPROVE VTE Individual Risk Assessment    RISK                                                                Points  [  ] Previous VTE                                                  3  [  ] Thrombophilia                                               2  [  ] Lower limb paralysis                                      2        (unable to hold up >15 seconds)    [  ] Current Cancer                                              2         (within 6 months)  [x ] Immobilization > 24 hrs                                1  [  ] ICU/CCU stay > 24 hours                              1  [ ] Age > 60                                                      1  IMPROVE VTE Score _1__DVT ppx Heparin 5000 sub q ______  )

## 2019-11-11 NOTE — H&P ADULT - NSHPPHYSICALEXAM_GEN_ALL_CORE
CONSTITUTIONAL: Well appearing, well nourished, awake, alert and in no apparent distress  ENT: Airway patent, Nasal mucosa clear. Mouth with normal mucosa. Throat has no vesicles, no oropharyngeal exudates and uvula is midline.  EYES: Clear bilaterally, pupils equal, round and reactive to light. EOMI.  CARDIAC: Normal rate, regular rhythm.  Heart sounds S1, S2.  No murmurs, rubs or gallops   RESPIRATORY: Breath sounds clear and equal bilaterally. No wheezes, rales or rhonchi  MUSCULOSKELETAL: Spine appears normal, range of motion is not limited, no muscle or joint tenderness  EXTREMITIES: Bilateral lower extremity edema is present   Mild Abdominal tenderness is present below the umbilicus.  NEUROLOGICAL: Alert and oriented, no focal deficits, no motor or sensory deficits.  SKIN: No rash, skin turgor

## 2019-11-11 NOTE — H&P ADULT - PROBLEM SELECTOR PLAN 2
Pt presented with sob most likely due to fluid overload due to Heart failure vs ESRD   s/p Lasix 80mg iv  c/w Carvedilol and Lasix  daily weight, intake and out put

## 2019-11-12 DIAGNOSIS — R07.89 OTHER CHEST PAIN: ICD-10-CM

## 2019-11-12 LAB
GLUCOSE BLDC GLUCOMTR-MCNC: 121 MG/DL — HIGH (ref 70–99)
GLUCOSE BLDC GLUCOMTR-MCNC: 85 MG/DL — SIGNIFICANT CHANGE UP (ref 70–99)
GLUCOSE BLDC GLUCOMTR-MCNC: 88 MG/DL — SIGNIFICANT CHANGE UP (ref 70–99)
GLUCOSE BLDC GLUCOMTR-MCNC: 90 MG/DL — SIGNIFICANT CHANGE UP (ref 70–99)
PCP SPEC-MCNC: SIGNIFICANT CHANGE UP

## 2019-11-12 PROCEDURE — 93970 EXTREMITY STUDY: CPT

## 2019-11-12 PROCEDURE — 71045 X-RAY EXAM CHEST 1 VIEW: CPT

## 2019-11-12 PROCEDURE — 85027 COMPLETE CBC AUTOMATED: CPT

## 2019-11-12 PROCEDURE — 99261: CPT

## 2019-11-12 PROCEDURE — 93005 ELECTROCARDIOGRAM TRACING: CPT

## 2019-11-12 PROCEDURE — 83735 ASSAY OF MAGNESIUM: CPT

## 2019-11-12 PROCEDURE — 36415 COLL VENOUS BLD VENIPUNCTURE: CPT

## 2019-11-12 PROCEDURE — 80053 COMPREHEN METABOLIC PANEL: CPT

## 2019-11-12 PROCEDURE — 99285 EMERGENCY DEPT VISIT HI MDM: CPT | Mod: 25

## 2019-11-12 PROCEDURE — 82607 VITAMIN B-12: CPT

## 2019-11-12 PROCEDURE — 82962 GLUCOSE BLOOD TEST: CPT

## 2019-11-12 PROCEDURE — 83036 HEMOGLOBIN GLYCOSYLATED A1C: CPT

## 2019-11-12 PROCEDURE — 84100 ASSAY OF PHOSPHORUS: CPT

## 2019-11-12 PROCEDURE — 84443 ASSAY THYROID STIM HORMONE: CPT

## 2019-11-12 PROCEDURE — 99223 1ST HOSP IP/OBS HIGH 75: CPT

## 2019-11-12 PROCEDURE — 80061 LIPID PANEL: CPT

## 2019-11-12 PROCEDURE — 99232 SBSQ HOSP IP/OBS MODERATE 35: CPT | Mod: GC

## 2019-11-12 PROCEDURE — 83880 ASSAY OF NATRIURETIC PEPTIDE: CPT

## 2019-11-12 PROCEDURE — 82803 BLOOD GASES ANY COMBINATION: CPT

## 2019-11-12 RX ORDER — ISOSORBIDE MONONITRATE 60 MG/1
30 TABLET, EXTENDED RELEASE ORAL DAILY
Refills: 0 | Status: DISCONTINUED | OUTPATIENT
Start: 2019-11-12 | End: 2019-11-14

## 2019-11-12 RX ADMIN — ALBUTEROL 2 PUFF(S): 90 AEROSOL, METERED ORAL at 15:00

## 2019-11-12 RX ADMIN — HEPARIN SODIUM 5000 UNIT(S): 5000 INJECTION INTRAVENOUS; SUBCUTANEOUS at 17:33

## 2019-11-12 RX ADMIN — CLOPIDOGREL BISULFATE 75 MILLIGRAM(S): 75 TABLET, FILM COATED ORAL at 11:26

## 2019-11-12 RX ADMIN — Medication 0.5 MILLIGRAM(S): at 06:51

## 2019-11-12 RX ADMIN — ALBUTEROL 2 PUFF(S): 90 AEROSOL, METERED ORAL at 08:44

## 2019-11-12 RX ADMIN — SEVELAMER CARBONATE 800 MILLIGRAM(S): 2400 POWDER, FOR SUSPENSION ORAL at 17:32

## 2019-11-12 RX ADMIN — ISOSORBIDE MONONITRATE 30 MILLIGRAM(S): 60 TABLET, EXTENDED RELEASE ORAL at 17:24

## 2019-11-12 RX ADMIN — PANTOPRAZOLE SODIUM 40 MILLIGRAM(S): 20 TABLET, DELAYED RELEASE ORAL at 06:45

## 2019-11-12 RX ADMIN — RISPERIDONE 1 MILLIGRAM(S): 4 TABLET ORAL at 06:45

## 2019-11-12 RX ADMIN — CARVEDILOL PHOSPHATE 6.25 MILLIGRAM(S): 80 CAPSULE, EXTENDED RELEASE ORAL at 17:33

## 2019-11-12 RX ADMIN — Medication 50 MILLIGRAM(S): at 13:13

## 2019-11-12 RX ADMIN — SEVELAMER CARBONATE 800 MILLIGRAM(S): 2400 POWDER, FOR SUSPENSION ORAL at 08:44

## 2019-11-12 RX ADMIN — ALBUTEROL 2 PUFF(S): 90 AEROSOL, METERED ORAL at 06:47

## 2019-11-12 RX ADMIN — HEPARIN SODIUM 5000 UNIT(S): 5000 INJECTION INTRAVENOUS; SUBCUTANEOUS at 06:45

## 2019-11-12 RX ADMIN — Medication 500 MILLIGRAM(S): at 11:26

## 2019-11-12 RX ADMIN — Medication 80 MILLIGRAM(S): at 06:45

## 2019-11-12 RX ADMIN — OXYCODONE AND ACETAMINOPHEN 1 TABLET(S): 5; 325 TABLET ORAL at 00:37

## 2019-11-12 RX ADMIN — BUDESONIDE AND FORMOTEROL FUMARATE DIHYDRATE 2 PUFF(S): 160; 4.5 AEROSOL RESPIRATORY (INHALATION) at 11:27

## 2019-11-12 RX ADMIN — ALBUTEROL 2 PUFF(S): 90 AEROSOL, METERED ORAL at 21:48

## 2019-11-12 RX ADMIN — Medication 0.5 MILLIGRAM(S): at 17:41

## 2019-11-12 RX ADMIN — Medication 50 MILLIGRAM(S): at 06:44

## 2019-11-12 RX ADMIN — TRAMADOL HYDROCHLORIDE 50 MILLIGRAM(S): 50 TABLET ORAL at 06:52

## 2019-11-12 RX ADMIN — Medication 1 MILLIGRAM(S): at 11:26

## 2019-11-12 RX ADMIN — RISPERIDONE 1 MILLIGRAM(S): 4 TABLET ORAL at 17:36

## 2019-11-12 RX ADMIN — CARVEDILOL PHOSPHATE 6.25 MILLIGRAM(S): 80 CAPSULE, EXTENDED RELEASE ORAL at 06:44

## 2019-11-12 RX ADMIN — SEVELAMER CARBONATE 800 MILLIGRAM(S): 2400 POWDER, FOR SUSPENSION ORAL at 13:13

## 2019-11-12 RX ADMIN — SIMVASTATIN 40 MILLIGRAM(S): 20 TABLET, FILM COATED ORAL at 21:47

## 2019-11-12 RX ADMIN — Medication 20 MILLIGRAM(S): at 11:26

## 2019-11-12 RX ADMIN — BUDESONIDE AND FORMOTEROL FUMARATE DIHYDRATE 2 PUFF(S): 160; 4.5 AEROSOL RESPIRATORY (INHALATION) at 21:47

## 2019-11-12 RX ADMIN — Medication 325 MILLIGRAM(S): at 11:26

## 2019-11-12 RX ADMIN — TRAMADOL HYDROCHLORIDE 50 MILLIGRAM(S): 50 TABLET ORAL at 07:52

## 2019-11-12 RX ADMIN — MIRTAZAPINE 7.5 MILLIGRAM(S): 45 TABLET, ORALLY DISINTEGRATING ORAL at 11:26

## 2019-11-12 RX ADMIN — TIOTROPIUM BROMIDE 1 CAPSULE(S): 18 CAPSULE ORAL; RESPIRATORY (INHALATION) at 11:26

## 2019-11-12 RX ADMIN — CHLORHEXIDINE GLUCONATE 1 APPLICATION(S): 213 SOLUTION TOPICAL at 06:46

## 2019-11-12 RX ADMIN — Medication 50 MILLIGRAM(S): at 21:47

## 2019-11-12 NOTE — PHYSICAL THERAPY INITIAL EVALUATION ADULT - PATIENT/FAMILY/SIGNIFICANT OTHER GOALS STATEMENT, PT EVAL
Patient stated that he feels the same with pain. Patient has been given pain medication and pt states that it is not helping with easing the pain

## 2019-11-12 NOTE — PROGRESS NOTE ADULT - ASSESSMENT
# end stage renal disease.  fluid overloaded. s/p  ultrafiltration yesterday 4 kilos take off. SOB better  # atypical chest pain setting of ongoing cocaine use/abuse. seen by cardiology. cardiac enzymes are negative   #HTN controlled  # anemia of CKD. cont procrit  # schizophrenia. cont current medications    suggested D/C home today and resume outpatient HD tommorrow at [facility].

## 2019-11-12 NOTE — PROGRESS NOTE ADULT - PROBLEM SELECTOR PLAN 1
Pt presented with chest pain atypical in nature  EKG- no ST T wave changes.  cardiac enzymes were negative  ECHO done on september showed Grade 3 DD and moderate pulmonary hypertension  pro BNP was 44 while it was 31,000 on last admission  Dr Galeana consulted  will get repeat BNP  will get ESR, CRP at pt pain seems to be more like pericarditis (with pain getting worse on lying down)   Pt is allergic to aspirin and c/w plavix   c/w Methocarbamol  no need for further inpatient cardiac workup, pt can follow with cardiologist outpatient

## 2019-11-12 NOTE — PROGRESS NOTE ADULT - SUBJECTIVE AND OBJECTIVE BOX
[facility] : Progress Note :: [phone], (office [phone]),   Dr La / Dr Hui / Dr Barber / Dr Villalobos / Dr Hang CASTELLANO / Dr Mehta / Dr Sanchez / Dr Alber dumont  _____________________________________________________________________________________________    denies any chest pain or shortness of breath . c/o bilateral leg edema.    aspirin (Short breath)  aspirin (Swelling)  fish (Unknown)  penicillin (Short breath)  penicillins (Swelling)  shellfish (Unknown)    Hospital Medications:   MEDICATIONS  (STANDING):  ALBUTerol    90 MICROgram(s) HFA Inhaler 2 Puff(s) Inhalation every 6 hours  ascorbic acid 500 milliGRAM(s) Oral daily  budesonide  80 MICROgram(s)/formoterol 4.5 MICROgram(s) Inhaler 2 Puff(s) Inhalation two times a day  carvedilol 6.25 milliGRAM(s) Oral every 12 hours  chlorhexidine 4% Liquid 1 Application(s) Topical <User Schedule>  clonazePAM  Tablet 0.5 milliGRAM(s) Oral every 12 hours  clopidogrel Tablet 75 milliGRAM(s) Oral daily  epoetin cyndie Injectable 24946 Unit(s) IV Push <User Schedule>  epoetin cyndie Injectable 4000 Unit(s) SubCutaneous <User Schedule>  ferrous    sulfate 325 milliGRAM(s) Oral daily  folic acid 1 milliGRAM(s) Oral daily  furosemide    Tablet 80 milliGRAM(s) Oral daily  heparin  Injectable 5000 Unit(s) SubCutaneous every 12 hours  hydrALAZINE 50 milliGRAM(s) Oral three times a day  insulin lispro (HumaLOG) corrective regimen sliding scale   SubCutaneous three times a day before meals  isosorbide   mononitrate ER Tablet (IMDUR) 30 milliGRAM(s) Oral daily  mirtazapine 7.5 milliGRAM(s) Oral daily  pantoprazole    Tablet 40 milliGRAM(s) Oral before breakfast  PARoxetine 20 milliGRAM(s) Oral daily  risperiDONE   Tablet 1 milliGRAM(s) Oral every 12 hours  sevelamer carbonate 800 milliGRAM(s) Oral three times a day with meals  simvastatin 40 milliGRAM(s) Oral at bedtime  tiotropium 18 MICROgram(s) Capsule 1 Capsule(s) Inhalation daily      VITALS:  T(F): 97.8 (11-12-19 @ 08:46), Max: 99.9 (11-12-19 @ 00:07)  HR: 77 (11-12-19 @ 11:34)  BP: 158/77 (11-12-19 @ 11:34)  RR: 20 (11-12-19 @ 08:46)  SpO2: 98% (11-12-19 @ 11:34)  Wt(kg): --    11-11 @ 07:01  -  11-12 @ 07:00  --------------------------------------------------------  IN: 0 mL / OUT: 680 mL / NET: -680 mL        PHYSICAL EXAM:  Constitutional: NAD  HEENT: anicteric sclera, oropharynx clear, MMM  Neck: No JVD  Respiratory: CTAB, no wheezes, rales or rhonchi  Cardiovascular: S1, S2, RRR  Gastrointestinal: BS+, soft, NT/ND  Extremities: bilateral  peripheral leg  edema+++  Neurological: A/O x 3, no focal deficits  Psychiatric: Normal mood, normal affect  : No CVA tenderness. No hernandez.   Vascular Access: AVF with thrill and bruit    LABS:  11-11    137  |  94<L>  |  66<H>  ----------------------------<  102<H>  4.6   |  31  |  8.68<H>    Ca    9.2      11 Nov 2019 07:31  Phos  7.3     11-11  Mg     2.5     11-11    TPro  7.8  /  Alb  2.7<L>  /  TBili  0.6  /  DBili      /  AST  14  /  ALT  17  /  AlkPhos  227<H>  11-11    Creatinine Trend: 8.68 <--, 8.17 <--                        8.7    8.61  )-----------( 286      ( 11 Nov 2019 07:31 )             30.0     Urine Studies:      RADIOLOGY & ADDITIONAL STUDIES:

## 2019-11-12 NOTE — PHYSICAL THERAPY INITIAL EVALUATION ADULT - LONG TERM MEMORY, REHAB EVAL
SUBJECTIVE:  Chief Complaint   Patient presents with     Urgent Care     Musculoskeletal Problem     Lt Shoulder pain x1.5-2 months, getting worst. Had fallen on ice -5 days ago. Has tendonitis on Lt arm      Martinez Granda is a 45 year old male who presents to the clinic today complaining of shoulder pain on the left side.    Onset of injury or pain was 5 day(s) ago and was sudden onset-  Fell onto the left elbow and pressure caused strain to the left shoulder.   The pain is achy, sharp and intermittent and superior, posterior and in the biceps and triceps muscle.  Injury history:  Had history of tendonitis in left shoulder 2 months ago  - though mostly resolved before he fell  Exacerbated by: movement overhead, movement of shoulder, sleeping on that shoulder, lifting objects and reaching across chest   Relieved by: rest  Associated symptoms -  He notes no Radiation of the pain  No Numbness in the arm  No Tingling    No Weakness.    The patient has tried activity restriction, Ibuprofen and tylenol to improve symptoms.  Though he has not stayed home from work at the [employer] where he has lifting and sorting activities       No past medical history on file.     Patient Active Problem List   Diagnosis     CARDIOVASCULAR SCREENING; LDL GOAL LESS THAN 160     Chewing tobacco nicotine dependence with nicotine-induced disorder     Dental caries       ALLERGIES:  Patient has no known allergies.      No current outpatient medications on file prior to visit.  No current facility-administered medications on file prior to visit.     Social History     Tobacco Use     Smoking status: Current Every Day Smoker     Smokeless tobacco: Never Used   Substance Use Topics     Alcohol use: Yes     Alcohol/week: 0.0 oz     Family History:  Non-contributory,  No associated family health conditions      ROS:  CONSTITUTIONAL:NEGATIVE for fever, chills,    INTEGUMENTARY/SKIN: NEGATIVE for worrisome rashes,   EYES: NEGATIVE for vision  changes or irritation  ENT/MOUTH: NEGATIVE for ear, mouth and throat problems  RESP:NEGATIVE for significant cough or SOB        EXAM:  /84 (BP Location: Right arm, Patient Position: Chair, Cuff Size: Adult Regular)   Pulse 88   Temp 99.4  F (37.4  C) (Oral)   SpO2 98%   General: no acute distress  Shoulder Exam: left shoulder  normal exam, no swelling, tenderness, instability; ligaments intact, FROM shoulder joint  Local rotator cuff pain with resisted flexion of the shoulder  little pain with resisted abduction of the shoulder  little pain with resisted extension of the shoulder  Greatest  pain with resisted adduction of the shoulder  Passive ROM of the shoulder with minimal pain -  Severe pain with lifting weight or resisted ROM  Pain into biceps and triceps muscle belly with lifting weight   no tenderness over biceps tendon  no tenderness supraspinatous muscle  no tenderness over the AC joint  Ext: pulses intact.  Neuro: sensation intact to light touch.     X-ray  Left  shoulder no fracture, no degenerative changes   x-ray read by me Priti Padilla MD    ASSESSMENT:    Left shoulder strain, initial encounter     - XR Shoulder Left G/E 3 Views; Future     We discussed the possible causes of the patient's musculoskeletal pain  ,  Including pain due to contusion of of muscle and other soft tissues,  Muscle strain,  Sprain of ligaments and/ or joint capsule, damage to articular cartilage/ structures,  Inflammation of a bursa,   Stress/ strain to tendons,  Bruising of bone,  Fracture of bone and acute worsening of chronic degenerative changes of joints.  X-ray was performed to assess for fracture , chronic degenerative changes and other bone abnormalities.     Based on exam and x-ray he appears to have sprain to the rotator cuff and strain to the biceps and triceps muscles caused by falling and landing on his elbow        Ibuprofen and/or acetaminophen as an alternative for pain     May use OTC Pasha-hernandez or icy  hot over the painful area  Follow-up with primary care or ortho if persistent symptoms after 2 weeks  Ice pack to injured region until swelling resolved. Then warm packs prn for comfort    Note for work       intact

## 2019-11-12 NOTE — PROGRESS NOTE ADULT - PROBLEM SELECTOR PLAN 3
Pt has hx of ESRD on HD   Pt had ultrafiltation yesterday  HD tomorrow  c/w sevelamer  -Nephrologist Dr La.

## 2019-11-12 NOTE — PROGRESS NOTE ADULT - PROBLEM SELECTOR PLAN 2
Pt presented with sob most likely due to fluid overload due to Heart failure vs ESRD   c/w Carvedilol and Lasix  daily weight, intake and out put  needs education and compliance counselling for his pedal edema

## 2019-11-12 NOTE — PROGRESS NOTE ADULT - SUBJECTIVE AND OBJECTIVE BOX
PGY 1 Note discussed with supervising resident and primary attending    Patient is a 39y old  Male who presents with a chief complaint of Chest pain (11 Nov 2019 15:39)      INTERVAL HPI/OVERNIGHT EVENTS: Patient seen and examined at the bedside. Pt reports he has pain with walking. BP was 180/95 last night, in control right now.     MEDICATIONS  (STANDING):  ALBUTerol    90 MICROgram(s) HFA Inhaler 2 Puff(s) Inhalation every 6 hours  ascorbic acid 500 milliGRAM(s) Oral daily  budesonide  80 MICROgram(s)/formoterol 4.5 MICROgram(s) Inhaler 2 Puff(s) Inhalation two times a day  carvedilol 6.25 milliGRAM(s) Oral every 12 hours  chlorhexidine 4% Liquid 1 Application(s) Topical <User Schedule>  clonazePAM  Tablet 0.5 milliGRAM(s) Oral every 12 hours  clopidogrel Tablet 75 milliGRAM(s) Oral daily  epoetin cyndie Injectable 02911 Unit(s) IV Push <User Schedule>  epoetin cyndie Injectable 4000 Unit(s) SubCutaneous <User Schedule>  ferrous    sulfate 325 milliGRAM(s) Oral daily  folic acid 1 milliGRAM(s) Oral daily  furosemide    Tablet 80 milliGRAM(s) Oral daily  heparin  Injectable 5000 Unit(s) SubCutaneous every 12 hours  hydrALAZINE 50 milliGRAM(s) Oral three times a day  insulin lispro (HumaLOG) corrective regimen sliding scale   SubCutaneous three times a day before meals  mirtazapine 7.5 milliGRAM(s) Oral daily  pantoprazole    Tablet 40 milliGRAM(s) Oral before breakfast  PARoxetine 20 milliGRAM(s) Oral daily  risperiDONE   Tablet 1 milliGRAM(s) Oral every 12 hours  sevelamer carbonate 800 milliGRAM(s) Oral three times a day with meals  simvastatin 40 milliGRAM(s) Oral at bedtime  tiotropium 18 MICROgram(s) Capsule 1 Capsule(s) Inhalation daily    MEDICATIONS  (PRN):  acetaminophen   Tablet .. 650 milliGRAM(s) Oral every 6 hours PRN Moderate Pain (4 - 6)  acetaminophen   Tablet .. 650 milliGRAM(s) Oral every 6 hours PRN Mild Pain (1 - 3)  methocarbamol 500 milliGRAM(s) Oral every 12 hours PRN spasms  traMADol 50 milliGRAM(s) Oral every 6 hours PRN Severe Pain (7 - 10)      __________________________________________________  REVIEW OF SYSTEMS:    CONSTITUTIONAL: No fever,   EYES: no acute visual disturbances  NECK: No pain or stiffness  RESPIRATORY: No cough; shortness of breath  CARDIOVASCULAR: No chest pain, no palpitations  GASTROINTESTINAL: No pain. No nausea or vomiting; No diarrhea   NEUROLOGICAL: No headache or numbness, no tremors  MUSCULOSKELETAL: No joint pain, no muscle pain, b/l leg swelling and difficulty walking  GENITOURINARY: no dysuria, no frequency, no hesitancy  PSYCHIATRY: no depression , no anxiety  ALL OTHER  ROS negative        Vital Signs Last 24 Hrs  T(C): 36.6 (12 Nov 2019 08:46), Max: 37.7 (12 Nov 2019 00:07)  T(F): 97.8 (12 Nov 2019 08:46), Max: 99.9 (12 Nov 2019 00:07)  HR: 80 (12 Nov 2019 08:46) (75 - 92)  BP: 130/63 (12 Nov 2019 08:46) (126/73 - 180/95)  BP(mean): --  RR: 20 (12 Nov 2019 08:46) (17 - 20)  SpO2: 100% (12 Nov 2019 08:46) (100% - 100%)    ________________________________________________  PHYSICAL EXAM:  GENERAL: NAD, morbidly obese pt  HEENT: Normocephalic;  conjunctivae and sclerae clear; moist mucous membranes;   NECK : supple  CHEST/LUNG: Clear to auscultation bilaterally with good air entry   HEART: S1 S2  regular; no murmurs, gallops or rubs  ABDOMEN: Soft, Nontender, Nondistended; Bowel sounds present  EXTREMITIES: no cyanosis; b/l LE edema ; AVF on L arm  SKIN: warm and dry; no rash  NERVOUS SYSTEM:  Awake and alert; Oriented  to place, person and time ; no new deficits    _________________________________________________  LABS:                        8.7    8.61  )-----------( 286      ( 11 Nov 2019 07:31 )             30.0     11-11    137  |  94<L>  |  66<H>  ----------------------------<  102<H>  4.6   |  31  |  8.68<H>    Ca    9.2      11 Nov 2019 07:31  Phos  7.3     11-11  Mg     2.5     11-11    TPro  7.8  /  Alb  2.7<L>  /  TBili  0.6  /  DBili  x   /  AST  14  /  ALT  17  /  AlkPhos  227<H>  11-11        CAPILLARY BLOOD GLUCOSE      POCT Blood Glucose.: 88 mg/dL (12 Nov 2019 08:26)  POCT Blood Glucose.: 98 mg/dL (11 Nov 2019 20:59)  POCT Blood Glucose.: 105 mg/dL (11 Nov 2019 16:37)  POCT Blood Glucose.: 85 mg/dL (11 Nov 2019 11:26)        RADIOLOGY & ADDITIONAL TESTS:    Imaging Personally Reviewed:  YES/NO    Consultant(s) Notes Reviewed:   YES/ No    Care Discussed with Consultants :     Plan of care was discussed with patient and /or primary care giver; all questions and concerns were addressed and care was aligned with patient's wishes.

## 2019-11-12 NOTE — PROGRESS NOTE ADULT - PROBLEM SELECTOR PLAN 6
Holding home medications Amlodipine due to Bilateral leg swelling  Currently on Hydralazine, Lopressor   Add Imdur as per cardio if bp becomes persistently elevated  DASH diet   Continue to monitor

## 2019-11-13 ENCOUNTER — TRANSCRIPTION ENCOUNTER (OUTPATIENT)
Age: 39
End: 2019-11-13

## 2019-11-13 LAB
ALBUMIN SERPL ELPH-MCNC: 2.6 G/DL — LOW (ref 3.5–5)
ALP SERPL-CCNC: 226 U/L — HIGH (ref 40–120)
ALT FLD-CCNC: 15 U/L DA — SIGNIFICANT CHANGE UP (ref 10–60)
ANION GAP SERPL CALC-SCNC: 12 MMOL/L — SIGNIFICANT CHANGE UP (ref 5–17)
AST SERPL-CCNC: 15 U/L — SIGNIFICANT CHANGE UP (ref 10–40)
BILIRUB SERPL-MCNC: 0.6 MG/DL — SIGNIFICANT CHANGE UP (ref 0.2–1.2)
BUN SERPL-MCNC: 74 MG/DL — HIGH (ref 7–18)
CALCIUM SERPL-MCNC: 8.7 MG/DL — SIGNIFICANT CHANGE UP (ref 8.4–10.5)
CHLORIDE SERPL-SCNC: 93 MMOL/L — LOW (ref 96–108)
CO2 SERPL-SCNC: 27 MMOL/L — SIGNIFICANT CHANGE UP (ref 22–31)
CREAT SERPL-MCNC: 10.2 MG/DL — HIGH (ref 0.5–1.3)
ERYTHROCYTE [SEDIMENTATION RATE] IN BLOOD: 96 MM/HR — HIGH (ref 0–15)
GLUCOSE BLDC GLUCOMTR-MCNC: 112 MG/DL — HIGH (ref 70–99)
GLUCOSE BLDC GLUCOMTR-MCNC: 119 MG/DL — HIGH (ref 70–99)
GLUCOSE BLDC GLUCOMTR-MCNC: 93 MG/DL — SIGNIFICANT CHANGE UP (ref 70–99)
GLUCOSE BLDC GLUCOMTR-MCNC: 94 MG/DL — SIGNIFICANT CHANGE UP (ref 70–99)
GLUCOSE SERPL-MCNC: 139 MG/DL — HIGH (ref 70–99)
HCT VFR BLD CALC: 30.4 % — LOW (ref 39–50)
HGB BLD-MCNC: 8.8 G/DL — LOW (ref 13–17)
MAGNESIUM SERPL-MCNC: 2.5 MG/DL — SIGNIFICANT CHANGE UP (ref 1.6–2.6)
MCHC RBC-ENTMCNC: 27 PG — SIGNIFICANT CHANGE UP (ref 27–34)
MCHC RBC-ENTMCNC: 28.9 GM/DL — LOW (ref 32–36)
MCV RBC AUTO: 93.3 FL — SIGNIFICANT CHANGE UP (ref 80–100)
NRBC # BLD: 0 /100 WBCS — SIGNIFICANT CHANGE UP (ref 0–0)
NT-PROBNP SERPL-SCNC: HIGH PG/ML (ref 0–125)
PHOSPHATE SERPL-MCNC: 7.8 MG/DL — HIGH (ref 2.5–4.5)
PLATELET # BLD AUTO: 306 K/UL — SIGNIFICANT CHANGE UP (ref 150–400)
POTASSIUM SERPL-MCNC: 5.5 MMOL/L — HIGH (ref 3.5–5.3)
POTASSIUM SERPL-SCNC: 5.5 MMOL/L — HIGH (ref 3.5–5.3)
PROT SERPL-MCNC: 7.5 G/DL — SIGNIFICANT CHANGE UP (ref 6–8.3)
RBC # BLD: 3.26 M/UL — LOW (ref 4.2–5.8)
RBC # FLD: 18.6 % — HIGH (ref 10.3–14.5)
SODIUM SERPL-SCNC: 132 MMOL/L — LOW (ref 135–145)
WBC # BLD: 8.01 K/UL — SIGNIFICANT CHANGE UP (ref 3.8–10.5)
WBC # FLD AUTO: 8.01 K/UL — SIGNIFICANT CHANGE UP (ref 3.8–10.5)

## 2019-11-13 PROCEDURE — 99232 SBSQ HOSP IP/OBS MODERATE 35: CPT | Mod: GC

## 2019-11-13 RX ORDER — ISOSORBIDE MONONITRATE 60 MG/1
1 TABLET, EXTENDED RELEASE ORAL
Qty: 30 | Refills: 0
Start: 2019-11-13 | End: 2019-12-12

## 2019-11-13 RX ADMIN — Medication 500 MILLIGRAM(S): at 12:23

## 2019-11-13 RX ADMIN — CHLORHEXIDINE GLUCONATE 1 APPLICATION(S): 213 SOLUTION TOPICAL at 05:31

## 2019-11-13 RX ADMIN — ERYTHROPOIETIN 10000 UNIT(S): 10000 INJECTION, SOLUTION INTRAVENOUS; SUBCUTANEOUS at 18:19

## 2019-11-13 RX ADMIN — BUDESONIDE AND FORMOTEROL FUMARATE DIHYDRATE 2 PUFF(S): 160; 4.5 AEROSOL RESPIRATORY (INHALATION) at 09:22

## 2019-11-13 RX ADMIN — SEVELAMER CARBONATE 800 MILLIGRAM(S): 2400 POWDER, FOR SUSPENSION ORAL at 09:22

## 2019-11-13 RX ADMIN — BUDESONIDE AND FORMOTEROL FUMARATE DIHYDRATE 2 PUFF(S): 160; 4.5 AEROSOL RESPIRATORY (INHALATION) at 21:49

## 2019-11-13 RX ADMIN — HEPARIN SODIUM 5000 UNIT(S): 5000 INJECTION INTRAVENOUS; SUBCUTANEOUS at 05:28

## 2019-11-13 RX ADMIN — Medication 1 MILLIGRAM(S): at 12:24

## 2019-11-13 RX ADMIN — RISPERIDONE 1 MILLIGRAM(S): 4 TABLET ORAL at 05:27

## 2019-11-13 RX ADMIN — Medication 80 MILLIGRAM(S): at 05:27

## 2019-11-13 RX ADMIN — TRAMADOL HYDROCHLORIDE 50 MILLIGRAM(S): 50 TABLET ORAL at 01:00

## 2019-11-13 RX ADMIN — ALBUTEROL 2 PUFF(S): 90 AEROSOL, METERED ORAL at 05:27

## 2019-11-13 RX ADMIN — CLOPIDOGREL BISULFATE 75 MILLIGRAM(S): 75 TABLET, FILM COATED ORAL at 12:24

## 2019-11-13 RX ADMIN — TRAMADOL HYDROCHLORIDE 50 MILLIGRAM(S): 50 TABLET ORAL at 00:22

## 2019-11-13 RX ADMIN — ALBUTEROL 2 PUFF(S): 90 AEROSOL, METERED ORAL at 09:22

## 2019-11-13 RX ADMIN — ALBUTEROL 2 PUFF(S): 90 AEROSOL, METERED ORAL at 14:05

## 2019-11-13 RX ADMIN — TRAMADOL HYDROCHLORIDE 50 MILLIGRAM(S): 50 TABLET ORAL at 14:04

## 2019-11-13 RX ADMIN — ALBUTEROL 2 PUFF(S): 90 AEROSOL, METERED ORAL at 21:50

## 2019-11-13 RX ADMIN — Medication 50 MILLIGRAM(S): at 05:27

## 2019-11-13 RX ADMIN — CARVEDILOL PHOSPHATE 6.25 MILLIGRAM(S): 80 CAPSULE, EXTENDED RELEASE ORAL at 05:27

## 2019-11-13 RX ADMIN — Medication 50 MILLIGRAM(S): at 14:04

## 2019-11-13 RX ADMIN — SEVELAMER CARBONATE 800 MILLIGRAM(S): 2400 POWDER, FOR SUSPENSION ORAL at 12:24

## 2019-11-13 RX ADMIN — ISOSORBIDE MONONITRATE 30 MILLIGRAM(S): 60 TABLET, EXTENDED RELEASE ORAL at 12:23

## 2019-11-13 RX ADMIN — SIMVASTATIN 40 MILLIGRAM(S): 20 TABLET, FILM COATED ORAL at 21:51

## 2019-11-13 RX ADMIN — Medication 20 MILLIGRAM(S): at 12:23

## 2019-11-13 RX ADMIN — Medication 0.5 MILLIGRAM(S): at 05:31

## 2019-11-13 RX ADMIN — Medication 325 MILLIGRAM(S): at 12:24

## 2019-11-13 RX ADMIN — Medication 50 MILLIGRAM(S): at 21:50

## 2019-11-13 RX ADMIN — MIRTAZAPINE 7.5 MILLIGRAM(S): 45 TABLET, ORALLY DISINTEGRATING ORAL at 12:23

## 2019-11-13 RX ADMIN — PANTOPRAZOLE SODIUM 40 MILLIGRAM(S): 20 TABLET, DELAYED RELEASE ORAL at 05:32

## 2019-11-13 NOTE — PROGRESS NOTE ADULT - ASSESSMENT
# end stage renal disease.  fluid overloaded. HD today with  ultrafiltration as tolerated.   # atypical chest pain setting of ongoing cocaine use/abuse. seen by cardiology. cardiac enzymes remain  negative   #HTN controlled  # anemia of CKD. cont procrit  # schizophrenia. cont current medications

## 2019-11-13 NOTE — DISCHARGE NOTE PROVIDER - NSDCMRMEDTOKEN_GEN_ALL_CORE_FT
amLODIPine 2.5 mg oral tablet: 1 tab(s) orally once a day   carvedilol 6.25 mg oral tablet: 1 tab(s) orally every 12 hours  clonazePAM 0.5 mg oral tablet: 1 tab(s) orally every 12 hours MDD:2 tabs per day  epoetin cyndie: 4000 unit(s) intravenously Monday, Wednesday, and Friday in HD  FeroSul 325 mg (65 mg elemental iron) oral tablet: 1 tab(s) orally 2 times a day x 30 days   folic acid 1 mg oral tablet: 1 tab(s) orally once a day  gabapentin 300 mg oral capsule: 1 cap(s) orally once a day (at bedtime)   hydrALAZINE 50 mg oral tablet: 1 tab(s) orally 3 times a day  ipratropium-albuterol 0.5 mg-2.5 mg/3 mLinhalation solution: 3 milliliter(s) inhaled every 8 hours   Lasix 80 mg oral tablet: 1 tab(s) orally once a day  methocarbamol 500 mg oral tablet: 1 tab(s) orally every 12 hours, As needed, spasms  mirtazapine 7.5 mg oral tablet: 1 tab(s) orally once a day (at bedtime)  NovoLOG 100 units/mL subcutaneous solution: 15 unit(s) subcutaneous 3 times a day (before meals)  pantoprazole 40 mg oral delayed release tablet: 1 tab(s) orally once a day (before a meal)  PARoxetine 20 mg oral tablet: 1 tab(s) orally once a day (at bedtime)  Plavix 75 mg oral tablet: 1 tab(s) orally once a day  risperiDONE 1 mg oral tablet: 1 tab(s) orally every 12 hours  sevelamer carbonate 800 mg oral tablet: 1 tab(s) orally 3 times a day (with meals)  simvastatin 40 mg oral tablet: 1 tab(s) orally once a day (at bedtime)  Vitamin C 500 mg oral tablet: 1 tab(s) orally once a day carvedilol 6.25 mg oral tablet: 1 tab(s) orally every 12 hours  clonazePAM 0.5 mg oral tablet: 1 tab(s) orally every 12 hours MDD:2 tabs per day  epoetin cyndie: 4000 unit(s) intravenously Monday, Wednesday, and Friday in   FeroSul 325 mg (65 mg elemental iron) oral tablet: 1 tab(s) orally 2 times a day x 30 days   folic acid 1 mg oral tablet: 1 tab(s) orally once a day  gabapentin 300 mg oral capsule: 1 cap(s) orally once a day (at bedtime)   hydrALAZINE 50 mg oral tablet: 1 tab(s) orally 3 times a day  ipratropium-albuterol 0.5 mg-2.5 mg/3 mLinhalation solution: 3 milliliter(s) inhaled every 8 hours   isosorbide mononitrate 30 mg oral tablet, extended release: 1 tab(s) orally once a day  Lasix 80 mg oral tablet: 1 tab(s) orally once a day  methocarbamol 500 mg oral tablet: 1 tab(s) orally every 12 hours, As needed, spasms  mirtazapine 7.5 mg oral tablet: 1 tab(s) orally once a day (at bedtime)  NovoLOG 100 units/mL subcutaneous solution: 15 unit(s) subcutaneous 3 times a day (before meals)  pantoprazole 40 mg oral delayed release tablet: 1 tab(s) orally once a day (before a meal)  PARoxetine 20 mg oral tablet: 1 tab(s) orally once a day (at bedtime)  Plavix 75 mg oral tablet: 1 tab(s) orally once a day  risperiDONE 1 mg oral tablet: 1 tab(s) orally every 12 hours  sevelamer carbonate 800 mg oral tablet: 1 tab(s) orally 3 times a day (with meals)  simvastatin 40 mg oral tablet: 1 tab(s) orally once a day (at bedtime)  Vitamin C 500 mg oral tablet: 1 tab(s) orally once a day

## 2019-11-13 NOTE — PROGRESS NOTE ADULT - PROBLEM SELECTOR PLAN 3
Pt has hx of ESRD on HD   Pt had ultrafiltation yesterday  HD today and dc plan  c/w sevelamer  -Nephrologist Dr La.

## 2019-11-13 NOTE — PROGRESS NOTE ADULT - ATTENDING COMMENTS
Patient seen and examined; discussed with resident and with case management.    Vital Signs Last 24 Hrs  T(C): 36.7 (13 Nov 2019 20:49), Max: 37.1 (13 Nov 2019 04:56)  T(F): 98 (13 Nov 2019 20:49), Max: 98.8 (13 Nov 2019 04:56)  HR: 86 (13 Nov 2019 20:49) (77 - 86)  BP: 141/80 (13 Nov 2019 20:49) (136/84 - 156/87)  BP(mean): --  RR: 18 (13 Nov 2019 20:49) (18 - 18)  SpO2: 99% (13 Nov 2019 20:49) (98% - 100%)    GEN: NAD  HEENT- normocephalic; mouth moist, nc in place   CVS- S1S2+  LUNGS- limited 2/2 habitus, breath sounds decreased, symmetric   ABD: Soft , nontender, obese nondistended, Bowel sounds are present  EXTREMITY: no calf tenderness, no cyanosis,++ edema, allodynia to light touch in the LE B/L   NEURO: AAOx3; non focal neurologic exam; cranial nerves grossly intact  PSYCH: somnolent  VASCULAR: + distal peripheral pulses  SKIN: warm and dry, tattoos           CXR reviewed  EKG Reviewed  Prev hospitalizations reviewed        38 y/o M with chronic hypoxic respiratory failure, ESRD on HD MWF, HFpEF, schizophrenia, cocaine abuse, chronic pain, medical noncompliance, morbid obesity, KRYSTIN, admitted with worsening fluid overload and chest pain. Last cocaine use was last Wednesday, he states he only uses when his leg pain becomes unbearable.     1. Acute on chronic diastolic heart failure- likely due to noncompliance with diet, medications, cocaine abuse. Most recent echo reviewed with preserved EF,  salt restriction, appreciate cardiology consultation.   2. Chest pain- atypical, cardiac eval as above, will defer repeat echo given recent study   3. Chronic hypoxic respiratory failure- on home O2 requirements, per last pulmonology note patient with PFTs c/w restrictive lung disease and decreased dlco, cont symbicort, patient unsure what inhalers he is taking, copd dx unclear   4. KRYSTIN- needs outpatient study for titration   5. Medical noncompliance- patient counseled   6. Schizophrenia and bipolar by history vs schizoaffective continue Risperdal, fluoxetine   7. Fluid overload, chronic.  Nephrology is slowly removing fluid.   8. Chronic pain- reports legs with  pain from thigh to feet b/l from swelling. Volume optimization as above, robaxin and tramadol as recommended on previous admissions. Patient known to pain service, avoid narcotics if possible.   9. ESRD on HD-dialyze again today.  Resume outpatient dialysis.   10. Cocaine abuse  11. Anxiety disorder- continue anxiolytic regimen, recently seen by psychiatry on most recent admission.  Discharge home.  Patient has outpatient Psychiatry appointment tomorrow.       12. Morbid obesity- bmi>40, recently seen by bariatric surgery team     Plan of care discussed with patient ;  all questions and concerns were addressed.
Patient was examined and discussed with Dr. Zavala and with Nephrology.      States he is depressed.  States he has no more depression meds at home.  Prefers to be dialyzed in the hospital.     He is somnolent, but arousable, in NAD  Vital Signs Last 24 Hrs  T(C): 36.7 (12 Nov 2019 15:22), Max: 37.7 (12 Nov 2019 00:07)  T(F): 98.1 (12 Nov 2019 15:22), Max: 99.9 (12 Nov 2019 00:07)  HR: 80 (12 Nov 2019 15:22) (75 - 92)  BP: 150/73 (12 Nov 2019 15:22) (126/73 - 180/95)  BP(mean): --  RR: 18 (12 Nov 2019 15:22) (18 - 20)  SpO2: 98% (12 Nov 2019 15:22) (97% - 100%)  Lungs, clear  Cor, RRR  Abdomen, obese, soft  Ext, edema up to thighs  Neurological, non-focal                        8.7    8.61  )-----------( 286      ( 11 Nov 2019 07:31 )             30.0   11-11    137  |  94<L>  |  66<H>  ----------------------------<  102<H>  4.6   |  31  |  8.68<H>    Ca    9.2      11 Nov 2019 07:31  Phos  7.3     11-11  Mg     2.5     11-11    TPro  7.8  /  Alb  2.7<L>  /  TBili  0.6  /  DBili  x   /  AST  14  /  ALT  17  /  AlkPhos  227<H>  11-11    IMP:  ESRD, on dialysis, stable, edema is slowly decreasing          Depression, history of mood disorder and JOHNATHAN, on medication          DM, moderate control  Plan:  Continue medication.  Request Psychiatry consultation.  I have called his PMD for medication reconciliation.  He has not seen PMD recently.  Will ask Nephrology to dialyze patient tomorrow, before discharge, at patient request.

## 2019-11-13 NOTE — PROGRESS NOTE ADULT - SUBJECTIVE AND OBJECTIVE BOX
PGY 1 Note discussed with supervising resident and primary attending    Patient is a 39y old  Male who presents with a chief complaint of Chest pain (13 Nov 2019 23:26)      INTERVAL HPI/OVERNIGHT EVENTS: Patient seen and examined at the bedside.     MEDICATIONS  (STANDING):  ALBUTerol    90 MICROgram(s) HFA Inhaler 2 Puff(s) Inhalation every 6 hours  ascorbic acid 500 milliGRAM(s) Oral daily  budesonide  80 MICROgram(s)/formoterol 4.5 MICROgram(s) Inhaler 2 Puff(s) Inhalation two times a day  carvedilol 6.25 milliGRAM(s) Oral every 12 hours  chlorhexidine 4% Liquid 1 Application(s) Topical <User Schedule>  clonazePAM  Tablet 0.5 milliGRAM(s) Oral every 12 hours  clopidogrel Tablet 75 milliGRAM(s) Oral daily  epoetin cyndie Injectable 99744 Unit(s) IV Push <User Schedule>  epoetin cyndie Injectable 4000 Unit(s) SubCutaneous <User Schedule>  ferrous    sulfate 325 milliGRAM(s) Oral daily  folic acid 1 milliGRAM(s) Oral daily  furosemide    Tablet 80 milliGRAM(s) Oral daily  heparin  Injectable 5000 Unit(s) SubCutaneous every 12 hours  hydrALAZINE 50 milliGRAM(s) Oral three times a day  insulin lispro (HumaLOG) corrective regimen sliding scale   SubCutaneous three times a day before meals  isosorbide   mononitrate ER Tablet (IMDUR) 30 milliGRAM(s) Oral daily  mirtazapine 7.5 milliGRAM(s) Oral daily  pantoprazole    Tablet 40 milliGRAM(s) Oral before breakfast  PARoxetine 20 milliGRAM(s) Oral daily  risperiDONE   Tablet 1 milliGRAM(s) Oral every 12 hours  sevelamer carbonate 800 milliGRAM(s) Oral three times a day with meals  simvastatin 40 milliGRAM(s) Oral at bedtime  tiotropium 18 MICROgram(s) Capsule 1 Capsule(s) Inhalation daily    MEDICATIONS  (PRN):  acetaminophen   Tablet .. 650 milliGRAM(s) Oral every 6 hours PRN Moderate Pain (4 - 6)  acetaminophen   Tablet .. 650 milliGRAM(s) Oral every 6 hours PRN Mild Pain (1 - 3)  methocarbamol 500 milliGRAM(s) Oral every 12 hours PRN spasms  traMADol 50 milliGRAM(s) Oral every 6 hours PRN Severe Pain (7 - 10)      __________________________________________________  REVIEW OF SYSTEMS:    CONSTITUTIONAL: No fever,   EYES: no acute visual disturbances  NECK: No pain or stiffness  RESPIRATORY: No cough; No shortness of breath  CARDIOVASCULAR: No chest pain, no palpitations  GASTROINTESTINAL: No pain. No nausea or vomiting; No diarrhea   NEUROLOGICAL: No headache or numbness, no tremors  MUSCULOSKELETAL: No joint pain, no muscle pain  GENITOURINARY: no dysuria, no frequency, no hesitancy  PSYCHIATRY: no depression , no anxiety  ALL OTHER  ROS negative        Vital Signs Last 24 Hrs  T(C): 36.9 (14 Nov 2019 11:13), Max: 37.4 (14 Nov 2019 00:00)  T(F): 98.5 (14 Nov 2019 11:13), Max: 99.4 (14 Nov 2019 00:00)  HR: 83 (14 Nov 2019 11:13) (77 - 100)  BP: 157/69 (14 Nov 2019 11:13) (136/84 - 157/69)  BP(mean): --  RR: 18 (14 Nov 2019 11:13) (18 - 20)  SpO2: 99% (14 Nov 2019 11:13) (98% - 100%)    ________________________________________________  PHYSICAL EXAM:  GENERAL: NAD  HEENT: Normocephalic;  conjunctivae and sclerae clear; moist mucous membranes;   NECK : supple  CHEST/LUNG: Clear to auscultation bilaterally with good air entry   HEART: S1 S2  regular; no murmurs, gallops or rubs  ABDOMEN: Soft, Nontender, Nondistended; Bowel sounds present  EXTREMITIES: no cyanosis; no edema; no calf tenderness  SKIN: warm and dry; no rash  NERVOUS SYSTEM:  Awake and alert; Oriented  to place, person and time ; no new deficits    _________________________________________________  LABS:                        8.8    8.01  )-----------( 306      ( 13 Nov 2019 17:10 )             30.4     11-13    132<L>  |  93<L>  |  74<H>  ----------------------------<  139<H>  5.5<H>   |  27  |  10.20<H>    Ca    8.7      13 Nov 2019 17:09  Phos  7.8     11-13  Mg     2.5     11-13    TPro  7.5  /  Alb  2.6<L>  /  TBili  0.6  /  DBili  x   /  AST  15  /  ALT  15  /  AlkPhos  226<H>  11-13        CAPILLARY BLOOD GLUCOSE      POCT Blood Glucose.: 106 mg/dL (14 Nov 2019 07:43)  POCT Blood Glucose.: 93 mg/dL (13 Nov 2019 21:23)  POCT Blood Glucose.: 119 mg/dL (13 Nov 2019 17:28)  POCT Blood Glucose.: 94 mg/dL (13 Nov 2019 11:33)        RADIOLOGY & ADDITIONAL TESTS:    Imaging Personally Reviewed:  YES/NO    Consultant(s) Notes Reviewed:   YES/ No    Care Discussed with Consultants :     Plan of care was discussed with patient and /or primary care giver; all questions and concerns were addressed and care was aligned with patient's wishes.

## 2019-11-13 NOTE — DISCHARGE NOTE PROVIDER - HOSPITAL COURSE
Pt is a 39 years old male from home, ambulates with cane with PMHx of ESRD on MWF HD (via LUE AVF), DM (on insulin), cocaine abuse, COPD on home O2 (41/2L NC), morbid obesity, Bipolar disorder, schizophrenia and HTN, former smoker with 30 pack history came to ED today for chest pain. Pt states substernal, 10/10, dull, squeezing, pressure like pain,, lasted for few minutes, aggravated with exercise and relieved with Rest, pain medication. It is a/w sob, bilateral leg swelling, Nausea,  orthopnea is present uses 3 pillows and unable to lay flat, he feels like drowning on laying down. He can walk 1/2 a block, and need to rest for 5-6mins, cannot climb stairs. It is relieved with oxygen and inhalers. Dr Mendez is his pulmonologist and visited 2 weeks back. He was given inhalers, allergy test was done. On Wednesday he had palpitations, checked his blood pressure 265/119 on the wrist and 180/119 on the arm. He was also breathing fast gasping for breath and took inhalers with minimal relief. He is non compliant with medications due to memory loss. Pt denies PND, Presyncope, diaphoresis, vision changes, headache, dysuria, pleuritic pain, positional pain, sick contacts, recent travel, N/V/D, Fever, chills.    [facility] Full code. Pt was admitted to Telemetry to r/o ACS.         EKG showd no ST T wave changes. Cardiac enzymes were negative. ECHO done on september showed Grade 3 DD and moderate pulmonary hypertension. Dr Galeana was consulted, ACS was ruled out, no need for further inpatient cardiac workup, pt can follow with cardiologist outpatient. For his acute on chronic diastolic congestive heart failure, pt was continued on Carvedilol and Lasix. Pt has ESRD (end stage renal disease) on dialysis. Ultrafiltration was done on 11/11 and HD on 11/13. Pt to follow outpatient with psychiatrist.         Patient is stable for discharge per attending and is advised to follow up with PCP as outpatient    Please refer to patient's complete medical chart with documents for a full hospital course, for this is only a brief summary.

## 2019-11-13 NOTE — DISCHARGE NOTE PROVIDER - NSDCCPCAREPLAN_GEN_ALL_CORE_FT
PRINCIPAL DISCHARGE DIAGNOSIS  Diagnosis: Chest pain, atypical  Assessment and Plan of Treatment: - You presented to the hospital with a complaint of chest pain.   - EKG showed normal sinus rhythm.   - you got Admitted to telemetry unit.   - Your serial cardiac enzymes were negative for Heart attack.   - No arrhythmia seEn on Tele monitor.     - You were seen by cardiologist Dr. Galeana/  - We are able to rule out acute coronary syndrome as the cause of your pain at this time.   - You are medically stable to be discharged from the hospital.  - You need to follow up with your Primary Care Physician in 1 week.      SECONDARY DISCHARGE DIAGNOSES  Diagnosis: ESRD (end stage renal disease) on dialysis  Assessment and Plan of Treatment: Continue your dialysis as intructed by your Nephrologist. Dialysis will be reinstated upon your discharge.  Please mantain a diet adequate for you condition  Continue dialysis on : ___ ___ and ___    Diagnosis: Acute on chronic diastolic congestive heart failure  Assessment and Plan of Treatment: Continue with blood pressure medications and Beta Blocker medication as indicated in the medication reconciliation. Maintain healthy diet and excersise and lose weight.  If you eat too much salt or drink too much fluid, your body's water content may increase and make your heart work harder. This can worsen your CHF.  Follow up with your primary care physician in one week after discharge to inform of your hospitalization.    Diagnosis: COPD (chronic obstructive pulmonary disease)  Assessment and Plan of Treatment: Continue with your nebulizers as intrusted by your primary care physician. Avoid triggers. If you're allergic to dust mites, pollens or molds, they can make your copd symptoms get worse. Cold air, exercise, fumes from chemicals or perfume, tobacco or wood smoke, and weather changes can also make asthma symptoms worse. So can common colds and sinus infections. Vaccinate with influenza vaccine yearly. Follow up with primary care physician one week after discharge.    Diagnosis: HTN (hypertension)  Assessment and Plan of Treatment: Continue with blood pressure medication. Maintain a healthy diet that consist of low sugar, low fat, low sodium diet. Exercise frequently if possible.  Follow up with primary care physician in one week after discharge. PRINCIPAL DISCHARGE DIAGNOSIS  Diagnosis: Chest pain, atypical  Assessment and Plan of Treatment: - You presented to the hospital with a complaint of chest pain.   - EKG showed normal sinus rhythm.   - you got Admitted to telemetry unit.   - Your serial cardiac enzymes were negative for Heart attack.   - Echo done on september showed Grade 3 diastolic dysfunction and moderate pulmonary hypertension.  - No arrhythmia seen on Tele monitor.     - You were seen by cardiologist Dr. Galeana.  - We are able to rule out acute coronary syndrome as the cause of your pain at this time.   - You are medically stable to be discharged from the hospital.  - You need to follow up with your Primary Care Physician in 1 week.  You can follow with cardiologist oupatient after discharge.      SECONDARY DISCHARGE DIAGNOSES  Diagnosis: ESRD (end stage renal disease) on dialysis  Assessment and Plan of Treatment: Continue your dialysis as intructed by your Nephrologist. Dialysis will be reinstated upon your discharge.  Please mantain a diet adequate for you condition  Continue dialysis on :  monday, wed and fri.    Diagnosis: Chronic leg pain  Assessment and Plan of Treatment: You have history of chronic leg pain. You are advised to follow with  a pain management doctor upon discharge. Options are provided.    Diagnosis: Acute on chronic diastolic congestive heart failure  Assessment and Plan of Treatment: Continue with blood pressure medications and Beta Blocker medication as indicated in the medication reconciliation. Maintain healthy diet and excersise and lose weight.  If you eat too much salt or drink too much fluid, your body's water content may increase and make your heart work harder. This can worsen your CHF.  Follow up with your primary care physician in one week after discharge to inform of your hospitalization.    Diagnosis: COPD (chronic obstructive pulmonary disease)  Assessment and Plan of Treatment: Continue with your nebulizers and home oxygen as intrusted by your  physician. Avoid triggers. If you're allergic to dust mites, pollens or molds, they can make your copd symptoms get worse. Cold air, exercise, fumes from chemicals or perfume, tobacco or wood smoke, and weather changes can also make asthma symptoms worse. So can common colds and sinus infections. Vaccinate with influenza vaccine yearly. Follow up with primary care physician one week after discharge.    Diagnosis: Bipolar disorder  Assessment and Plan of Treatment: You have an appointment with psychiatrist tomorrow. Please follow with your psychiatrist for medications.    Diagnosis: HTN (hypertension)  Assessment and Plan of Treatment: Continue with blood pressure medication. Maintain a healthy diet that consist of low sugar, low fat, low sodium diet. Exercise frequently if possible.  Follow up with primary care physician in one week after discharge.

## 2019-11-13 NOTE — PROGRESS NOTE ADULT - SUBJECTIVE AND OBJECTIVE BOX
MEDICAL ATTENDING NOTE  Patient is a 39y old  Male who presents with a chief complaint of Chest pain (13 Nov 2019 16:00)      HPI:  Pt is a 39 years old male from home, ambulates with cane with PMHx of ESRD on MWF HD (via LUE AVF), DM (on insulin), cocaine abuse, COPD on home O2 (41/2L NC), morbid obesity, Bipolar disorder, schizophrenia and HTN, former smoker with 30 pack history came to ED today for chest pain. Pt states substernal, 10/10, dull, squeezing, pressure like pain,, lasted for few minutes, aggravated with exercise and relieved with Rest, pain medication. It is a/w sob, bilateral leg swelling, Nausea,  orthopnea is present uses 3 pillows and unable to lay flat, he feels like drowning on laying down. He can walk 1/2 a block, and need to rest for 5-6mins, cannot climb stairs. It is relieved with oxygen and inhalers. Dr Mendez is his pulmonologist and visited 2 weeks back. He was given inhalers, allergy test was done. On Wednesday he had palpitations, checked his blood pressure 265/119 on the wrist and 180/119 on the arm. He was also breathing fast gasping for breath and took inhalers with minimal relief. He is non compliant with medications due to memory loss. Pt denies PND, Presyncope, diaphoresis, vision changes, headache, dysuria, pleuritic pain, positional pain, sick contacts, recent travel, N/V/D, Fever, chills.  [facility] Full code (11 Nov 2019 01:40)      INTERVAL HPI/OVERNIGHT EVENTS: no new complaints    MEDICATIONS  (STANDING):  ALBUTerol    90 MICROgram(s) HFA Inhaler 2 Puff(s) Inhalation every 6 hours  ascorbic acid 500 milliGRAM(s) Oral daily  budesonide  80 MICROgram(s)/formoterol 4.5 MICROgram(s) Inhaler 2 Puff(s) Inhalation two times a day  carvedilol 6.25 milliGRAM(s) Oral every 12 hours  chlorhexidine 4% Liquid 1 Application(s) Topical <User Schedule>  clonazePAM  Tablet 0.5 milliGRAM(s) Oral every 12 hours  clopidogrel Tablet 75 milliGRAM(s) Oral daily  epoetin cyndie Injectable 68987 Unit(s) IV Push <User Schedule>  epoetin cyndie Injectable 4000 Unit(s) SubCutaneous <User Schedule>  ferrous    sulfate 325 milliGRAM(s) Oral daily  folic acid 1 milliGRAM(s) Oral daily  furosemide    Tablet 80 milliGRAM(s) Oral daily  heparin  Injectable 5000 Unit(s) SubCutaneous every 12 hours  hydrALAZINE 50 milliGRAM(s) Oral three times a day  insulin lispro (HumaLOG) corrective regimen sliding scale   SubCutaneous three times a day before meals  isosorbide   mononitrate ER Tablet (IMDUR) 30 milliGRAM(s) Oral daily  mirtazapine 7.5 milliGRAM(s) Oral daily  pantoprazole    Tablet 40 milliGRAM(s) Oral before breakfast  PARoxetine 20 milliGRAM(s) Oral daily  risperiDONE   Tablet 1 milliGRAM(s) Oral every 12 hours  sevelamer carbonate 800 milliGRAM(s) Oral three times a day with meals  simvastatin 40 milliGRAM(s) Oral at bedtime  tiotropium 18 MICROgram(s) Capsule 1 Capsule(s) Inhalation daily    MEDICATIONS  (PRN):  acetaminophen   Tablet .. 650 milliGRAM(s) Oral every 6 hours PRN Moderate Pain (4 - 6)  acetaminophen   Tablet .. 650 milliGRAM(s) Oral every 6 hours PRN Mild Pain (1 - 3)  methocarbamol 500 milliGRAM(s) Oral every 12 hours PRN spasms  traMADol 50 milliGRAM(s) Oral every 6 hours PRN Severe Pain (7 - 10)      __________________________________________________  REVIEW OF SYSTEMS:    CONSTITUTIONAL: No fever,   EYES: no acute visual disturbances  NECK: No pain or stiffness  RESPIRATORY: No cough; No shortness of breath  CARDIOVASCULAR: No chest pain, no palpitations  GASTROINTESTINAL: No pain. No nausea or vomiting; No diarrhea   NEUROLOGICAL: No headache or numbness, no tremors  MUSCULOSKELETAL: No joint pain, no muscle pain  PSYCHIATRY: c/o depression and anxiety  c/o pain in his thighs        Vital Signs Last 24 Hrs  T(C): 36.7 (13 Nov 2019 20:49), Max: 37.1 (13 Nov 2019 04:56)  T(F): 98 (13 Nov 2019 20:49), Max: 98.8 (13 Nov 2019 04:56)  HR: 86 (13 Nov 2019 20:49) (77 - 86)  BP: 141/80 (13 Nov 2019 20:49) (136/84 - 156/87)  BP(mean): --  RR: 18 (13 Nov 2019 20:49) (18 - 18)  SpO2: 99% (13 Nov 2019 20:49) (98% - 100%)    ________________________________________________  PHYSICAL EXAM:  GENERAL: NAD  HEENT: Normocephalic;  conjunctivae and sclerae clear; moist mucous membranes;   NECK : supple  CHEST/LUNG: Clear to auscultation bilaterally with good air entry   HEART: S1 S2  regular; no murmurs, gallops or rubs  ABDOMEN: Soft, Nontender, Nondistended; Bowel sounds present  EXTREMITIES: no cyanosis; no edema; no calf tenderness  SKIN: warm and dry; no rash  NERVOUS SYSTEM:  Awake and alert; Oriented  to place, person and time ; no new deficits    _________________________________________________  LABS:                        8.8    8.01  )-----------( 306      ( 13 Nov 2019 17:10 )             30.4     11-13    132<L>  |  93<L>  |  74<H>  ----------------------------<  139<H>  5.5<H>   |  27  |  10.20<H>    Ca    8.7      13 Nov 2019 17:09  Phos  7.8     11-13  Mg     2.5     11-13    TPro  7.5  /  Alb  2.6<L>  /  TBili  0.6  /  DBili  x   /  AST  15  /  ALT  15  /  AlkPhos  226<H>  11-13        CAPILLARY BLOOD GLUCOSE      POCT Blood Glucose.: 93 mg/dL (13 Nov 2019 21:23)  POCT Blood Glucose.: 119 mg/dL (13 Nov 2019 17:28)  POCT Blood Glucose.: 94 mg/dL (13 Nov 2019 11:33)  POCT Blood Glucose.: 112 mg/dL (13 Nov 2019 07:49)

## 2019-11-13 NOTE — PROGRESS NOTE ADULT - SUBJECTIVE AND OBJECTIVE BOX
[facility] : Progress Note :: [phone], (office [phone]),   Dr La / Dr Hui / Dr Barber / Dr Villalobos / Dr Hang CASTELLANO / Dr Mehta / Dr Sanchez / Dr Alber dumont  _____________________________________________________________________________________________    has chest pain on and off.    aspirin (Short breath)  aspirin (Swelling)  fish (Unknown)  penicillin (Short breath)  penicillins (Swelling)  shellfish (Unknown)    Hospital Medications:   MEDICATIONS  (STANDING):  ALBUTerol    90 MICROgram(s) HFA Inhaler 2 Puff(s) Inhalation every 6 hours  ascorbic acid 500 milliGRAM(s) Oral daily  budesonide  80 MICROgram(s)/formoterol 4.5 MICROgram(s) Inhaler 2 Puff(s) Inhalation two times a day  carvedilol 6.25 milliGRAM(s) Oral every 12 hours  chlorhexidine 4% Liquid 1 Application(s) Topical <User Schedule>  clonazePAM  Tablet 0.5 milliGRAM(s) Oral every 12 hours  clopidogrel Tablet 75 milliGRAM(s) Oral daily  epoetin cyndie Injectable 67875 Unit(s) IV Push <User Schedule>  epoetin cyndie Injectable 4000 Unit(s) SubCutaneous <User Schedule>  ferrous    sulfate 325 milliGRAM(s) Oral daily  folic acid 1 milliGRAM(s) Oral daily  furosemide    Tablet 80 milliGRAM(s) Oral daily  heparin  Injectable 5000 Unit(s) SubCutaneous every 12 hours  hydrALAZINE 50 milliGRAM(s) Oral three times a day  insulin lispro (HumaLOG) corrective regimen sliding scale   SubCutaneous three times a day before meals  isosorbide   mononitrate ER Tablet (IMDUR) 30 milliGRAM(s) Oral daily  mirtazapine 7.5 milliGRAM(s) Oral daily  pantoprazole    Tablet 40 milliGRAM(s) Oral before breakfast  PARoxetine 20 milliGRAM(s) Oral daily  risperiDONE   Tablet 1 milliGRAM(s) Oral every 12 hours  sevelamer carbonate 800 milliGRAM(s) Oral three times a day with meals  simvastatin 40 milliGRAM(s) Oral at bedtime  tiotropium 18 MICROgram(s) Capsule 1 Capsule(s) Inhalation daily        VITALS:  T(F): 97.9 (11-13-19 @ 15:55), Max: 98.9 (11-12-19 @ 23:08)  HR: 83 (11-13-19 @ 15:55)  BP: 145/74 (11-13-19 @ 15:55)  RR: 18 (11-13-19 @ 15:55)  SpO2: 98% (11-13-19 @ 15:55)  Wt(kg): --    11-13 @ 07:01  -  11-13 @ 16:01  --------------------------------------------------------  IN: 0 mL / OUT: 300 mL / NET: -300 mL        PHYSICAL EXAM:  Constitutional: NAD  HEENT: anicteric sclera, oropharynx clear.  Neck: No JVD  Respiratory: CTAB, no wheezes, rales or rhonchi  Cardiovascular: S1, S2, RRR  Gastrointestinal: BS+, soft, NT/ND  Extremities:  peripheral edema++  Neurological: A/O x 3, no focal deficits  Vascular Access: AVF with thrill and bruit    LABS:        Creatinine Trend: 8.68 <--, 8.17 <--    Urine Studies:      RADIOLOGY & ADDITIONAL STUDIES:

## 2019-11-13 NOTE — DISCHARGE NOTE PROVIDER - CARE PROVIDER_API CALL
Eliceo Coburn)  Family Medicine  [address] [place] [address]  Phone: [phone]  Fax: [phone]  Established Patient  Follow Up Time: 1 week

## 2019-11-14 ENCOUNTER — TRANSCRIPTION ENCOUNTER (OUTPATIENT)
Age: 39
End: 2019-11-14

## 2019-11-14 VITALS
DIASTOLIC BLOOD PRESSURE: 84 MMHG | SYSTOLIC BLOOD PRESSURE: 149 MMHG | RESPIRATION RATE: 18 BRPM | OXYGEN SATURATION: 100 % | HEART RATE: 83 BPM | TEMPERATURE: 98 F

## 2019-11-14 LAB
CRP SERPL-MCNC: 7.55 MG/DL — HIGH (ref 0–0.4)
GLUCOSE BLDC GLUCOMTR-MCNC: 106 MG/DL — HIGH (ref 70–99)
GLUCOSE BLDC GLUCOMTR-MCNC: 107 MG/DL — HIGH (ref 70–99)
GLUCOSE BLDC GLUCOMTR-MCNC: 127 MG/DL — HIGH (ref 70–99)

## 2019-11-14 PROCEDURE — 71045 X-RAY EXAM CHEST 1 VIEW: CPT

## 2019-11-14 PROCEDURE — 94640 AIRWAY INHALATION TREATMENT: CPT

## 2019-11-14 PROCEDURE — 85027 COMPLETE CBC AUTOMATED: CPT

## 2019-11-14 PROCEDURE — 80053 COMPREHEN METABOLIC PANEL: CPT

## 2019-11-14 PROCEDURE — 99285 EMERGENCY DEPT VISIT HI MDM: CPT | Mod: 25

## 2019-11-14 PROCEDURE — 82962 GLUCOSE BLOOD TEST: CPT

## 2019-11-14 PROCEDURE — 83735 ASSAY OF MAGNESIUM: CPT

## 2019-11-14 PROCEDURE — 80307 DRUG TEST PRSMV CHEM ANLYZR: CPT

## 2019-11-14 PROCEDURE — 86140 C-REACTIVE PROTEIN: CPT

## 2019-11-14 PROCEDURE — 83880 ASSAY OF NATRIURETIC PEPTIDE: CPT

## 2019-11-14 PROCEDURE — 84484 ASSAY OF TROPONIN QUANT: CPT

## 2019-11-14 PROCEDURE — 36415 COLL VENOUS BLD VENIPUNCTURE: CPT

## 2019-11-14 PROCEDURE — 99238 HOSP IP/OBS DSCHRG MGMT 30/<: CPT

## 2019-11-14 PROCEDURE — 85652 RBC SED RATE AUTOMATED: CPT

## 2019-11-14 PROCEDURE — 99261: CPT

## 2019-11-14 PROCEDURE — 96374 THER/PROPH/DIAG INJ IV PUSH: CPT

## 2019-11-14 PROCEDURE — 84100 ASSAY OF PHOSPHORUS: CPT

## 2019-11-14 PROCEDURE — 93005 ELECTROCARDIOGRAM TRACING: CPT

## 2019-11-14 RX ADMIN — CLOPIDOGREL BISULFATE 75 MILLIGRAM(S): 75 TABLET, FILM COATED ORAL at 11:27

## 2019-11-14 RX ADMIN — Medication 1 MILLIGRAM(S): at 11:27

## 2019-11-14 RX ADMIN — BUDESONIDE AND FORMOTEROL FUMARATE DIHYDRATE 2 PUFF(S): 160; 4.5 AEROSOL RESPIRATORY (INHALATION) at 11:27

## 2019-11-14 RX ADMIN — MIRTAZAPINE 7.5 MILLIGRAM(S): 45 TABLET, ORALLY DISINTEGRATING ORAL at 11:27

## 2019-11-14 RX ADMIN — HEPARIN SODIUM 5000 UNIT(S): 5000 INJECTION INTRAVENOUS; SUBCUTANEOUS at 06:11

## 2019-11-14 RX ADMIN — TIOTROPIUM BROMIDE 1 CAPSULE(S): 18 CAPSULE ORAL; RESPIRATORY (INHALATION) at 11:26

## 2019-11-14 RX ADMIN — Medication 20 MILLIGRAM(S): at 11:26

## 2019-11-14 RX ADMIN — SEVELAMER CARBONATE 800 MILLIGRAM(S): 2400 POWDER, FOR SUSPENSION ORAL at 13:34

## 2019-11-14 RX ADMIN — Medication 500 MILLIGRAM(S): at 11:27

## 2019-11-14 RX ADMIN — RISPERIDONE 1 MILLIGRAM(S): 4 TABLET ORAL at 17:44

## 2019-11-14 RX ADMIN — SEVELAMER CARBONATE 800 MILLIGRAM(S): 2400 POWDER, FOR SUSPENSION ORAL at 08:32

## 2019-11-14 RX ADMIN — Medication 50 MILLIGRAM(S): at 13:34

## 2019-11-14 RX ADMIN — ALBUTEROL 2 PUFF(S): 90 AEROSOL, METERED ORAL at 08:33

## 2019-11-14 RX ADMIN — PANTOPRAZOLE SODIUM 40 MILLIGRAM(S): 20 TABLET, DELAYED RELEASE ORAL at 06:16

## 2019-11-14 RX ADMIN — Medication 80 MILLIGRAM(S): at 06:10

## 2019-11-14 RX ADMIN — CARVEDILOL PHOSPHATE 6.25 MILLIGRAM(S): 80 CAPSULE, EXTENDED RELEASE ORAL at 17:44

## 2019-11-14 RX ADMIN — Medication 50 MILLIGRAM(S): at 06:10

## 2019-11-14 RX ADMIN — Medication 0.5 MILLIGRAM(S): at 06:16

## 2019-11-14 RX ADMIN — Medication 325 MILLIGRAM(S): at 11:27

## 2019-11-14 RX ADMIN — CARVEDILOL PHOSPHATE 6.25 MILLIGRAM(S): 80 CAPSULE, EXTENDED RELEASE ORAL at 06:11

## 2019-11-14 RX ADMIN — ALBUTEROL 2 PUFF(S): 90 AEROSOL, METERED ORAL at 17:22

## 2019-11-14 RX ADMIN — CHLORHEXIDINE GLUCONATE 1 APPLICATION(S): 213 SOLUTION TOPICAL at 06:11

## 2019-11-14 RX ADMIN — ISOSORBIDE MONONITRATE 30 MILLIGRAM(S): 60 TABLET, EXTENDED RELEASE ORAL at 11:26

## 2019-11-14 RX ADMIN — TRAMADOL HYDROCHLORIDE 50 MILLIGRAM(S): 50 TABLET ORAL at 07:28

## 2019-11-14 RX ADMIN — ALBUTEROL 2 PUFF(S): 90 AEROSOL, METERED ORAL at 03:08

## 2019-11-14 RX ADMIN — RISPERIDONE 1 MILLIGRAM(S): 4 TABLET ORAL at 06:11

## 2019-11-14 RX ADMIN — TRAMADOL HYDROCHLORIDE 50 MILLIGRAM(S): 50 TABLET ORAL at 06:15

## 2019-11-14 RX ADMIN — SEVELAMER CARBONATE 800 MILLIGRAM(S): 2400 POWDER, FOR SUSPENSION ORAL at 17:44

## 2019-11-14 NOTE — DISCHARGE NOTE NURSING/CASE MANAGEMENT/SOCIAL WORK - PATIENT PORTAL LINK FT
You can access the FollowMyHealth Patient Portal offered by [facility] by registering at the following website: [email or web address]. By joining [employer]’s FollowMyHealth portal, you will also be able to view your health information using other applications (apps) compatible with our system.

## 2019-11-14 NOTE — PROGRESS NOTE ADULT - ASSESSMENT
# end stage renal disease.  fluid overloaded. s/p  ultrafiltration yesterday. Plans for discharge.  # atypical chest pain setting of ongoing cocaine use/abuse. seen by cardiology. cardiac enzymes are negative   #HTN controlled  # anemia of CKD. cont procrit  # schizophrenia. cont current medications    D/C planning

## 2019-11-14 NOTE — PROGRESS NOTE ADULT - SUBJECTIVE AND OBJECTIVE BOX
[facility] : Progress Note :: [phone], (office [phone]),   Dr La / Dr Hui / Dr Barber / Dr Villalobos / Dr Hang CASTELLANO / Dr Mehta / Dr Sanchez / Dr Alber dumont  _____________________________________________________________________________________________    denies chest pain.     aspirin (Short breath)  aspirin (Swelling)  fish (Unknown)  penicillin (Short breath)  penicillins (Swelling)  shellfish (Unknown)    Hospital Medications:   MEDICATIONS  (STANDING):  ALBUTerol    90 MICROgram(s) HFA Inhaler 2 Puff(s) Inhalation every 6 hours  ascorbic acid 500 milliGRAM(s) Oral daily  budesonide  80 MICROgram(s)/formoterol 4.5 MICROgram(s) Inhaler 2 Puff(s) Inhalation two times a day  carvedilol 6.25 milliGRAM(s) Oral every 12 hours  chlorhexidine 4% Liquid 1 Application(s) Topical <User Schedule>  clonazePAM  Tablet 0.5 milliGRAM(s) Oral every 12 hours  clopidogrel Tablet 75 milliGRAM(s) Oral daily  epoetin cyndie Injectable 40761 Unit(s) IV Push <User Schedule>  epoetin cyndie Injectable 4000 Unit(s) SubCutaneous <User Schedule>  ferrous    sulfate 325 milliGRAM(s) Oral daily  folic acid 1 milliGRAM(s) Oral daily  furosemide    Tablet 80 milliGRAM(s) Oral daily  heparin  Injectable 5000 Unit(s) SubCutaneous every 12 hours  hydrALAZINE 50 milliGRAM(s) Oral three times a day  insulin lispro (HumaLOG) corrective regimen sliding scale   SubCutaneous three times a day before meals  isosorbide   mononitrate ER Tablet (IMDUR) 30 milliGRAM(s) Oral daily  mirtazapine 7.5 milliGRAM(s) Oral daily  pantoprazole    Tablet 40 milliGRAM(s) Oral before breakfast  PARoxetine 20 milliGRAM(s) Oral daily  risperiDONE   Tablet 1 milliGRAM(s) Oral every 12 hours  sevelamer carbonate 800 milliGRAM(s) Oral three times a day with meals  simvastatin 40 milliGRAM(s) Oral at bedtime  tiotropium 18 MICROgram(s) Capsule 1 Capsule(s) Inhalation daily        VITALS:  T(F): 98.5 (11-14-19 @ 11:13), Max: 99.4 (11-14-19 @ 00:00)  HR: 83 (11-14-19 @ 11:13)  BP: 157/69 (11-14-19 @ 11:13)  RR: 18 (11-14-19 @ 11:13)  SpO2: 99% (11-14-19 @ 11:13)  Wt(kg): --    11-13 @ 07:01  -  11-14 @ 07:00  --------------------------------------------------------  IN: 318 mL / OUT: 300 mL / NET: 18 mL    11-14 @ 07:01  -  11-14 @ 14:50  --------------------------------------------------------  IN: 410 mL / OUT: 200 mL / NET: 210 mL        PHYSICAL EXAM:  Constitutional: NAD  HEENT: anicteric sclera, oropharynx clear.  Neck: No JVD  Respiratory: CTAB, no wheezes, rales or rhonchi  Cardiovascular: S1, S2, RRR  Gastrointestinal: BS+, soft, NT/ND  Extremities:  peripheral edema++  Neurological: A/O x 3, no focal deficits  Vascular Access: AVF with thrill and bruit    LABS:  11-13    132<L>  |  93<L>  |  74<H>  ----------------------------<  139<H>  5.5<H>   |  27  |  10.20<H>    Ca    8.7      13 Nov 2019 17:09  Phos  7.8     11-13  Mg     2.5     11-13    TPro  7.5  /  Alb  2.6<L>  /  TBili  0.6  /  DBili      /  AST  15  /  ALT  15  /  AlkPhos  226<H>  11-13    Creatinine Trend: 10.20 <--, 8.68 <--, 8.17 <--                        8.8    8.01  )-----------( 306      ( 13 Nov 2019 17:10 )             30.4     Urine Studies:      RADIOLOGY & ADDITIONAL STUDIES:

## 2019-11-17 ENCOUNTER — INPATIENT (INPATIENT)
Facility: HOSPITAL | Age: 39
LOS: 2 days | Discharge: HOME | DRG: 555 | End: 2019-11-20
Attending: INTERNAL MEDICINE | Admitting: INTERNAL MEDICINE
Payer: MEDICARE

## 2019-11-17 VITALS
HEIGHT: 78 IN | DIASTOLIC BLOOD PRESSURE: 103 MMHG | RESPIRATION RATE: 24 BRPM | TEMPERATURE: 98 F | SYSTOLIC BLOOD PRESSURE: 167 MMHG | OXYGEN SATURATION: 95 % | WEIGHT: 315 LBS | HEART RATE: 84 BPM

## 2019-11-17 DIAGNOSIS — Z98.890 OTHER SPECIFIED POSTPROCEDURAL STATES: Chronic | ICD-10-CM

## 2019-11-17 PROCEDURE — 71045 X-RAY EXAM CHEST 1 VIEW: CPT | Mod: 26

## 2019-11-17 RX ORDER — IPRATROPIUM/ALBUTEROL SULFATE 18-103MCG
3 AEROSOL WITH ADAPTER (GRAM) INHALATION ONCE
Refills: 0 | Status: COMPLETED | OUTPATIENT
Start: 2019-11-17 | End: 2019-11-17

## 2019-11-17 RX ORDER — ACETAMINOPHEN 500 MG
1000 TABLET ORAL ONCE
Refills: 0 | Status: DISCONTINUED | OUTPATIENT
Start: 2019-11-17 | End: 2019-11-18

## 2019-11-17 RX ORDER — MORPHINE SULFATE 50 MG/1
4 CAPSULE, EXTENDED RELEASE ORAL ONCE
Refills: 0 | Status: DISCONTINUED | OUTPATIENT
Start: 2019-11-17 | End: 2019-11-17

## 2019-11-17 NOTE — ED PROVIDER NOTE - AGGRAVATING FACTORS
Attending Physician Attestation Note:    Resident Physician: Ana Roman MD    Pt is a 52 year old female here for the following concerns:  Hip pain after a fall without LOC s/p slipping on ice     Impression and Plan:  Continue NSAID, follow up hip xray     Pt  was seen, and examined with resident. H&P discussed and agree with A&P. Please refer to resident note for details.     I agree with the history, exam and plan as noted by the resident physician.    Please see resident note for details.      Yanely Rey MD  4/23/2019             none

## 2019-11-17 NOTE — ED PROVIDER NOTE - CLINICAL SUMMARY MEDICAL DECISION MAKING FREE TEXT BOX
40 y/o M pt with multiple comorbidities and on home O2 presents to ED with chest pain and lower extremity swelling and pain. Will obtain EKG, labs, CXR, give morphine and IV Tylenol, and reassess. 38 y/o M pt with multiple comorbidities and on home O2 presents to ED with chest pain and lower extremity swelling and pain. Will obtain EKG, labs, CXR, give morphine and IV Tylenol, and reassess.    EKG shows no ischemic changes, trop neg, ddimer 3K - previously 2K and recent duplex study on 10/15 neg for DVT, CXR shows increased interstitial markings  discussed above with patient, on reeval leg pain improved, no recurrence of chest pain.  repeat duplex study ordered, will defer anticoagulation in setting of patient numerous presentation for leg pain w/o evidence of DVT  Spoke to Dr. La-nephrology who recommends admission since patient will be unable to go to HD w/o his portable O2.

## 2019-11-17 NOTE — ED PROVIDER NOTE - OBJECTIVE STATEMENT
41 y/o M pt with PMHx of ESRD on hemodialysis, COPD with nasal canula at baseline, obesity, HTN, schizophrenia and bipolar disorder, and PSHx of hernia repair presents to ED c/o chest pain and bilateral leg pain. Pt was seen in ER and dc from ER on 11/14 for shortness of breath, and he related developing bilateral leg pain that was mild. Pt states he was instructed to use hemp oil and hemp cream for relief as he was already on too many meds. Pt endorses having stabbing pain in bilateral legs radiating from the thighs down earlier today, and experiencing chest pain after calling the ambulance. Pt also endorses that his shortness of breath has persisted ever since he was dc on 11/14. Pt does not take anything else for pain at home, and denies fever, cough, and any other acute complaints.

## 2019-11-17 NOTE — ED PROVIDER NOTE - MUSCULOSKELETAL, MLM
Spine appears normal, range of motion is not limited, no muscle or joint tenderness. +3 lower extremity swelling up to thighs.

## 2019-11-18 DIAGNOSIS — F31.9 BIPOLAR DISORDER, UNSPECIFIED: ICD-10-CM

## 2019-11-18 DIAGNOSIS — J44.9 CHRONIC OBSTRUCTIVE PULMONARY DISEASE, UNSPECIFIED: ICD-10-CM

## 2019-11-18 DIAGNOSIS — M79.604 PAIN IN RIGHT LEG: ICD-10-CM

## 2019-11-18 DIAGNOSIS — Z29.9 ENCOUNTER FOR PROPHYLACTIC MEASURES, UNSPECIFIED: ICD-10-CM

## 2019-11-18 DIAGNOSIS — I10 ESSENTIAL (PRIMARY) HYPERTENSION: ICD-10-CM

## 2019-11-18 DIAGNOSIS — N18.6 END STAGE RENAL DISEASE: ICD-10-CM

## 2019-11-18 DIAGNOSIS — E11.9 TYPE 2 DIABETES MELLITUS WITHOUT COMPLICATIONS: ICD-10-CM

## 2019-11-18 LAB
ALBUMIN SERPL ELPH-MCNC: 2.7 G/DL — LOW (ref 3.5–5)
ALP SERPL-CCNC: 237 U/L — HIGH (ref 40–120)
ALT FLD-CCNC: 19 U/L DA — SIGNIFICANT CHANGE UP (ref 10–60)
ANION GAP SERPL CALC-SCNC: 11 MMOL/L — SIGNIFICANT CHANGE UP (ref 5–17)
AST SERPL-CCNC: 14 U/L — SIGNIFICANT CHANGE UP (ref 10–40)
BILIRUB SERPL-MCNC: 0.5 MG/DL — SIGNIFICANT CHANGE UP (ref 0.2–1.2)
BUN SERPL-MCNC: 76 MG/DL — HIGH (ref 7–18)
CALCIUM SERPL-MCNC: 9.6 MG/DL — SIGNIFICANT CHANGE UP (ref 8.4–10.5)
CHLORIDE SERPL-SCNC: 95 MMOL/L — LOW (ref 96–108)
CO2 SERPL-SCNC: 32 MMOL/L — HIGH (ref 22–31)
CREAT SERPL-MCNC: 9.14 MG/DL — HIGH (ref 0.5–1.3)
D DIMER BLD IA.RAPID-MCNC: 3028 NG/ML DDU — HIGH
GLUCOSE SERPL-MCNC: 102 MG/DL — HIGH (ref 70–99)
HCT VFR BLD CALC: 31.5 % — LOW (ref 39–50)
HGB BLD-MCNC: 9.1 G/DL — LOW (ref 13–17)
MCHC RBC-ENTMCNC: 27.3 PG — SIGNIFICANT CHANGE UP (ref 27–34)
MCHC RBC-ENTMCNC: 28.9 GM/DL — LOW (ref 32–36)
MCV RBC AUTO: 94.6 FL — SIGNIFICANT CHANGE UP (ref 80–100)
NRBC # BLD: 0 /100 WBCS — SIGNIFICANT CHANGE UP (ref 0–0)
PLATELET # BLD AUTO: 324 K/UL — SIGNIFICANT CHANGE UP (ref 150–400)
POTASSIUM SERPL-MCNC: 4.7 MMOL/L — SIGNIFICANT CHANGE UP (ref 3.5–5.3)
POTASSIUM SERPL-SCNC: 4.7 MMOL/L — SIGNIFICANT CHANGE UP (ref 3.5–5.3)
PROT SERPL-MCNC: 8.2 G/DL — SIGNIFICANT CHANGE UP (ref 6–8.3)
RBC # BLD: 3.33 M/UL — LOW (ref 4.2–5.8)
RBC # FLD: 18.4 % — HIGH (ref 10.3–14.5)
SODIUM SERPL-SCNC: 138 MMOL/L — SIGNIFICANT CHANGE UP (ref 135–145)
TROPONIN I SERPL-MCNC: <0.015 NG/ML — SIGNIFICANT CHANGE UP (ref 0–0.04)
WBC # BLD: 9.46 K/UL — SIGNIFICANT CHANGE UP (ref 3.8–10.5)
WBC # FLD AUTO: 9.46 K/UL — SIGNIFICANT CHANGE UP (ref 3.8–10.5)

## 2019-11-18 PROCEDURE — 99232 SBSQ HOSP IP/OBS MODERATE 35: CPT | Mod: GC

## 2019-11-18 PROCEDURE — 99285 EMERGENCY DEPT VISIT HI MDM: CPT

## 2019-11-18 RX ORDER — ALBUTEROL 90 UG/1
2 AEROSOL, METERED ORAL EVERY 6 HOURS
Refills: 0 | Status: DISCONTINUED | OUTPATIENT
Start: 2019-11-18 | End: 2019-11-20

## 2019-11-18 RX ORDER — PANTOPRAZOLE SODIUM 20 MG/1
40 TABLET, DELAYED RELEASE ORAL
Refills: 0 | Status: DISCONTINUED | OUTPATIENT
Start: 2019-11-18 | End: 2019-11-20

## 2019-11-18 RX ORDER — TRAMADOL HYDROCHLORIDE 50 MG/1
25 TABLET ORAL EVERY 8 HOURS
Refills: 0 | Status: DISCONTINUED | OUTPATIENT
Start: 2019-11-18 | End: 2019-11-19

## 2019-11-18 RX ORDER — FUROSEMIDE 40 MG
80 TABLET ORAL DAILY
Refills: 0 | Status: DISCONTINUED | OUTPATIENT
Start: 2019-11-18 | End: 2019-11-20

## 2019-11-18 RX ORDER — CLONAZEPAM 1 MG
0.5 TABLET ORAL EVERY 12 HOURS
Refills: 0 | Status: DISCONTINUED | OUTPATIENT
Start: 2019-11-18 | End: 2019-11-20

## 2019-11-18 RX ORDER — ASCORBIC ACID 60 MG
500 TABLET,CHEWABLE ORAL DAILY
Refills: 0 | Status: DISCONTINUED | OUTPATIENT
Start: 2019-11-18 | End: 2019-11-20

## 2019-11-18 RX ORDER — HEPARIN SODIUM 5000 [USP'U]/ML
5000 INJECTION INTRAVENOUS; SUBCUTANEOUS EVERY 12 HOURS
Refills: 0 | Status: DISCONTINUED | OUTPATIENT
Start: 2019-11-18 | End: 2019-11-20

## 2019-11-18 RX ORDER — METHOCARBAMOL 500 MG/1
500 TABLET, FILM COATED ORAL EVERY 12 HOURS
Refills: 0 | Status: DISCONTINUED | OUTPATIENT
Start: 2019-11-18 | End: 2019-11-20

## 2019-11-18 RX ORDER — SEVELAMER CARBONATE 2400 MG/1
800 POWDER, FOR SUSPENSION ORAL
Refills: 0 | Status: DISCONTINUED | OUTPATIENT
Start: 2019-11-18 | End: 2019-11-20

## 2019-11-18 RX ORDER — HYDRALAZINE HCL 50 MG
50 TABLET ORAL THREE TIMES A DAY
Refills: 0 | Status: DISCONTINUED | OUTPATIENT
Start: 2019-11-18 | End: 2019-11-20

## 2019-11-18 RX ORDER — SIMVASTATIN 20 MG/1
40 TABLET, FILM COATED ORAL AT BEDTIME
Refills: 0 | Status: DISCONTINUED | OUTPATIENT
Start: 2019-11-18 | End: 2019-11-20

## 2019-11-18 RX ORDER — OXYCODONE AND ACETAMINOPHEN 5; 325 MG/1; MG/1
1 TABLET ORAL ONCE
Refills: 0 | Status: DISCONTINUED | OUTPATIENT
Start: 2019-11-18 | End: 2019-11-18

## 2019-11-18 RX ORDER — INSULIN LISPRO 100/ML
VIAL (ML) SUBCUTANEOUS
Refills: 0 | Status: DISCONTINUED | OUTPATIENT
Start: 2019-11-18 | End: 2019-11-20

## 2019-11-18 RX ORDER — RISPERIDONE 4 MG/1
1 TABLET ORAL EVERY 12 HOURS
Refills: 0 | Status: DISCONTINUED | OUTPATIENT
Start: 2019-11-18 | End: 2019-11-20

## 2019-11-18 RX ORDER — MORPHINE SULFATE 50 MG/1
4 CAPSULE, EXTENDED RELEASE ORAL ONCE
Refills: 0 | Status: DISCONTINUED | OUTPATIENT
Start: 2019-11-18 | End: 2019-11-18

## 2019-11-18 RX ORDER — MIRTAZAPINE 45 MG/1
7.5 TABLET, ORALLY DISINTEGRATING ORAL AT BEDTIME
Refills: 0 | Status: DISCONTINUED | OUTPATIENT
Start: 2019-11-18 | End: 2019-11-20

## 2019-11-18 RX ORDER — GABAPENTIN 400 MG/1
300 CAPSULE ORAL AT BEDTIME
Refills: 0 | Status: DISCONTINUED | OUTPATIENT
Start: 2019-11-18 | End: 2019-11-20

## 2019-11-18 RX ORDER — ISOSORBIDE MONONITRATE 60 MG/1
30 TABLET, EXTENDED RELEASE ORAL DAILY
Refills: 0 | Status: DISCONTINUED | OUTPATIENT
Start: 2019-11-18 | End: 2019-11-20

## 2019-11-18 RX ORDER — CLOPIDOGREL BISULFATE 75 MG/1
75 TABLET, FILM COATED ORAL DAILY
Refills: 0 | Status: DISCONTINUED | OUTPATIENT
Start: 2019-11-18 | End: 2019-11-20

## 2019-11-18 RX ORDER — ACETAMINOPHEN 500 MG
975 TABLET ORAL ONCE
Refills: 0 | Status: COMPLETED | OUTPATIENT
Start: 2019-11-18 | End: 2019-11-18

## 2019-11-18 RX ORDER — CARVEDILOL PHOSPHATE 80 MG/1
6.25 CAPSULE, EXTENDED RELEASE ORAL EVERY 12 HOURS
Refills: 0 | Status: DISCONTINUED | OUTPATIENT
Start: 2019-11-18 | End: 2019-11-20

## 2019-11-18 RX ORDER — ERYTHROPOIETIN 10000 [IU]/ML
4000 INJECTION, SOLUTION INTRAVENOUS; SUBCUTANEOUS
Refills: 0 | Status: DISCONTINUED | OUTPATIENT
Start: 2019-11-18 | End: 2019-11-20

## 2019-11-18 RX ORDER — FOLIC ACID 0.8 MG
1 TABLET ORAL DAILY
Refills: 0 | Status: DISCONTINUED | OUTPATIENT
Start: 2019-11-18 | End: 2019-11-20

## 2019-11-18 RX ADMIN — OXYCODONE AND ACETAMINOPHEN 1 TABLET(S): 5; 325 TABLET ORAL at 06:40

## 2019-11-18 RX ADMIN — Medication 975 MILLIGRAM(S): at 00:47

## 2019-11-18 RX ADMIN — CARVEDILOL PHOSPHATE 6.25 MILLIGRAM(S): 80 CAPSULE, EXTENDED RELEASE ORAL at 19:43

## 2019-11-18 RX ADMIN — MORPHINE SULFATE 4 MILLIGRAM(S): 50 CAPSULE, EXTENDED RELEASE ORAL at 02:46

## 2019-11-18 RX ADMIN — OXYCODONE AND ACETAMINOPHEN 1 TABLET(S): 5; 325 TABLET ORAL at 11:24

## 2019-11-18 RX ADMIN — GABAPENTIN 300 MILLIGRAM(S): 400 CAPSULE ORAL at 23:04

## 2019-11-18 RX ADMIN — MORPHINE SULFATE 4 MILLIGRAM(S): 50 CAPSULE, EXTENDED RELEASE ORAL at 03:02

## 2019-11-18 RX ADMIN — HEPARIN SODIUM 5000 UNIT(S): 5000 INJECTION INTRAVENOUS; SUBCUTANEOUS at 19:44

## 2019-11-18 RX ADMIN — Medication 0.5 MILLIGRAM(S): at 19:43

## 2019-11-18 RX ADMIN — Medication 975 MILLIGRAM(S): at 02:46

## 2019-11-18 RX ADMIN — SEVELAMER CARBONATE 800 MILLIGRAM(S): 2400 POWDER, FOR SUSPENSION ORAL at 19:43

## 2019-11-18 RX ADMIN — RISPERIDONE 1 MILLIGRAM(S): 4 TABLET ORAL at 19:44

## 2019-11-18 RX ADMIN — MORPHINE SULFATE 4 MILLIGRAM(S): 50 CAPSULE, EXTENDED RELEASE ORAL at 00:47

## 2019-11-18 RX ADMIN — ERYTHROPOIETIN 4000 UNIT(S): 10000 INJECTION, SOLUTION INTRAVENOUS; SUBCUTANEOUS at 10:12

## 2019-11-18 RX ADMIN — Medication 3 MILLILITER(S): at 00:47

## 2019-11-18 RX ADMIN — MORPHINE SULFATE 4 MILLIGRAM(S): 50 CAPSULE, EXTENDED RELEASE ORAL at 06:32

## 2019-11-18 RX ADMIN — Medication 50 MILLIGRAM(S): at 23:04

## 2019-11-18 RX ADMIN — SIMVASTATIN 40 MILLIGRAM(S): 20 TABLET, FILM COATED ORAL at 23:04

## 2019-11-18 RX ADMIN — MIRTAZAPINE 7.5 MILLIGRAM(S): 45 TABLET, ORALLY DISINTEGRATING ORAL at 23:04

## 2019-11-18 NOTE — H&P ADULT - ATTENDING COMMENTS
Patient is a 39y old  Male who presents with a chief complaint of b/l LE pain (18 Nov 2019 15:05)  Patient was seen and examined at bedside  Still complains of BL leg pain, denies any other complains   Vitals stable, had dialysis today    REVIEW OF SYSTEMS: denies fever, chills, SOB, palpitations, chest pain, abdominal pain, nausea, vomiting, diarrhea, constipation, dizziness    PHYSICAL EXAM:  GENERAL: morbidly obese   NERVOUS SYSTEM:  Alert & Oriented X3, Good concentration  CHEST/LUNG: Clear to percussion bilaterally; No rales, rhonchi, wheezing, or rubs  HEART: Regular rate and rhythm; No murmurs, rubs, or gallops  ABDOMEN: Soft, Nontender, Nondistended; Bowel sounds present  EXTREMITIES: BL LE lymphedema and tenderness  SKIN: No rashes or lesions    LABS:                        9.1    9.46  )-----------( 324      ( 18 Nov 2019 02:42 )             31.5   138  |  95<L>  |  76<H>  ----------------------------<  102<H>  4.7   |  32<H>  |  9.14<H>    Assessment and plan:   1. BL LE pain  Chronic pain  Cont Tramadol  Cont Gabapentin  Consult Pain management    2. ESRD  Cont HD as per schedule    3. HTN  Cont Home meds with holding parameters    4. COPD: cont PRN albuterol     5. Bipolar D: Patient is on Risperdal, Paroxetine, Clonazepam and Mirtazapine     6. Renal diet, Heparin SQ for DVT Prophylaxis, Pain management clinic appointment on discharge

## 2019-11-18 NOTE — CONSULT NOTE ADULT - ASSESSMENT
# end stage renal disease.  fluid overloaded. s/p  HD earlier today.  # chronic leg pains.  #HTN controlled  # anemia of CKD. cont procrit  # schizophrenia. cont current medications    no objection to D/C today

## 2019-11-18 NOTE — H&P ADULT - PROBLEM SELECTOR PLAN 7
IMPROVE VTE Individual Risk Assessment          RISK                                                          Points  [  ] Previous VTE                                                3  [  ] Thrombophilia                                             2  [  ] Lower limb paralysis                                   2        (unable to hold up >15 seconds)    [  ] Current Cancer                                             2         (within 6 months)  [ X ] Immobilization > 24 hrs                              1  [  ] ICU/CCU stay > 24 hours                             1  [  ] Age > 60                                                         1    IMPROVE VTE Score: 1    heparin subq for dvt ppx

## 2019-11-18 NOTE — ED ADULT NURSE NOTE - ED STAT RN HANDOFF DETAILS
Pt stable, admitted to [facility], in no distress at this time, endorsed to DANIEL Wheatley

## 2019-11-18 NOTE — H&P ADULT - NSHPPHYSICALEXAM_GEN_ALL_CORE
Vital Signs Last 24 Hrs  T(C): 36.6 (18 Nov 2019 09:45), Max: 36.9 (18 Nov 2019 06:32)  T(F): 97.9 (18 Nov 2019 09:45), Max: 98.5 (18 Nov 2019 06:32)  HR: 93 (18 Nov 2019 09:45) (84 - 93)  BP: 168/91 (18 Nov 2019 09:45) (146/82 - 168/91)  BP(mean): --  RR: 19 (18 Nov 2019 09:45) (18 - 24)  SpO2: 100% (18 Nov 2019 09:45) (95% - 100%)

## 2019-11-18 NOTE — H&P ADULT - PROBLEM SELECTOR PLAN 1
p/w complaint of persistent chronic LE pain  will continue managing pain, however avoid opioids as patient shows signs of pain medication seeking behavior  PT consulted  supportive care

## 2019-11-18 NOTE — H&P ADULT - PROBLEM SELECTOR PLAN 4
takes novolog 15 units tid at home  loree baires at present - will start hss only for now  titrate insulin regimen up as needed  recent HbA1C noted

## 2019-11-18 NOTE — ED ADULT NURSE REASSESSMENT NOTE - NS ED NURSE REASSESS COMMENT FT1
Pt reassessed, observed laying in bed, breathing via NC @ 2l/min, tolerating well. Pt is A&O x3, able to make needs known, c/o of pain, meds administered as ordered, tolerated well. Pt ambulates on own, right upper arm #[id number] in place. Admitted to [facility], awaiting bed, endorsed to DANIEL Wheatley. Nursing monitoring continues, no family at bedside at this time.

## 2019-11-18 NOTE — H&P ADULT - HISTORY OF PRESENT ILLNESS
40 y/o male from home, ambulates with cane with PMHx of ESRD (on MWF HD via LUE AVF), DM (on insulin), cocaine abuse, COPD on home O2 (2L NC), morbid obesity, Bipolar disorder, schizophrenia and HTN, former smoker with 30 pack history presents to the ED with chief complaint of bilateral LE pain. He describes having chronic LE pain, but describes it as feeling worse than when he was discharged recently from [facility]. Patient was cleared for discharge by ED, however had no portable oxygen with him, and decision was made to admit the patient. At present, he continues to complain of b/l LE pain and persistently requests opioid pain relief. He has no other complaints at this time.    GOC: Full code

## 2019-11-19 ENCOUNTER — TRANSCRIPTION ENCOUNTER (OUTPATIENT)
Age: 39
End: 2019-11-19

## 2019-11-19 DIAGNOSIS — F31.70 BIPOLAR DISORDER, CURRENTLY IN REMISSION, MOST RECENT EPISODE UNSPECIFIED: ICD-10-CM

## 2019-11-19 DIAGNOSIS — F20.9 SCHIZOPHRENIA, UNSPECIFIED: ICD-10-CM

## 2019-11-19 LAB
ANION GAP SERPL CALC-SCNC: 9 MMOL/L — SIGNIFICANT CHANGE UP (ref 5–17)
BASOPHILS # BLD AUTO: 0.04 K/UL — SIGNIFICANT CHANGE UP (ref 0–0.2)
BASOPHILS NFR BLD AUTO: 0.4 % — SIGNIFICANT CHANGE UP (ref 0–2)
BUN SERPL-MCNC: 54 MG/DL — HIGH (ref 7–18)
CALCIUM SERPL-MCNC: 9.8 MG/DL — SIGNIFICANT CHANGE UP (ref 8.4–10.5)
CHLORIDE SERPL-SCNC: 99 MMOL/L — SIGNIFICANT CHANGE UP (ref 96–108)
CO2 SERPL-SCNC: 28 MMOL/L — SIGNIFICANT CHANGE UP (ref 22–31)
CREAT SERPL-MCNC: 7.91 MG/DL — HIGH (ref 0.5–1.3)
EOSINOPHIL # BLD AUTO: 0.58 K/UL — HIGH (ref 0–0.5)
EOSINOPHIL NFR BLD AUTO: 6.2 % — HIGH (ref 0–6)
GLUCOSE BLDC GLUCOMTR-MCNC: 100 MG/DL — HIGH (ref 70–99)
GLUCOSE BLDC GLUCOMTR-MCNC: 161 MG/DL — HIGH (ref 70–99)
GLUCOSE BLDC GLUCOMTR-MCNC: 180 MG/DL — HIGH (ref 70–99)
GLUCOSE BLDC GLUCOMTR-MCNC: 95 MG/DL — SIGNIFICANT CHANGE UP (ref 70–99)
GLUCOSE SERPL-MCNC: 84 MG/DL — SIGNIFICANT CHANGE UP (ref 70–99)
HCT VFR BLD CALC: 32.6 % — LOW (ref 39–50)
HGB BLD-MCNC: 9.4 G/DL — LOW (ref 13–17)
IMM GRANULOCYTES NFR BLD AUTO: 0.3 % — SIGNIFICANT CHANGE UP (ref 0–1.5)
LYMPHOCYTES # BLD AUTO: 0.91 K/UL — LOW (ref 1–3.3)
LYMPHOCYTES # BLD AUTO: 9.7 % — LOW (ref 13–44)
MCHC RBC-ENTMCNC: 27.3 PG — SIGNIFICANT CHANGE UP (ref 27–34)
MCHC RBC-ENTMCNC: 28.8 GM/DL — LOW (ref 32–36)
MCV RBC AUTO: 94.8 FL — SIGNIFICANT CHANGE UP (ref 80–100)
MONOCYTES # BLD AUTO: 1.39 K/UL — HIGH (ref 0–0.9)
MONOCYTES NFR BLD AUTO: 14.8 % — HIGH (ref 2–14)
NEUTROPHILS # BLD AUTO: 6.47 K/UL — SIGNIFICANT CHANGE UP (ref 1.8–7.4)
NEUTROPHILS NFR BLD AUTO: 68.6 % — SIGNIFICANT CHANGE UP (ref 43–77)
NRBC # BLD: 0 /100 WBCS — SIGNIFICANT CHANGE UP (ref 0–0)
PLATELET # BLD AUTO: 362 K/UL — SIGNIFICANT CHANGE UP (ref 150–400)
POTASSIUM SERPL-MCNC: 5.7 MMOL/L — HIGH (ref 3.5–5.3)
POTASSIUM SERPL-SCNC: 5.7 MMOL/L — HIGH (ref 3.5–5.3)
RBC # BLD: 3.44 M/UL — LOW (ref 4.2–5.8)
RBC # FLD: 18.4 % — HIGH (ref 10.3–14.5)
SODIUM SERPL-SCNC: 136 MMOL/L — SIGNIFICANT CHANGE UP (ref 135–145)
WBC # BLD: 9.42 K/UL — SIGNIFICANT CHANGE UP (ref 3.8–10.5)
WBC # FLD AUTO: 9.42 K/UL — SIGNIFICANT CHANGE UP (ref 3.8–10.5)

## 2019-11-19 PROCEDURE — 99223 1ST HOSP IP/OBS HIGH 75: CPT

## 2019-11-19 PROCEDURE — 93970 EXTREMITY STUDY: CPT | Mod: 26

## 2019-11-19 PROCEDURE — 99233 SBSQ HOSP IP/OBS HIGH 50: CPT | Mod: GC

## 2019-11-19 RX ORDER — CHLORHEXIDINE GLUCONATE 213 G/1000ML
1 SOLUTION TOPICAL
Refills: 0 | Status: DISCONTINUED | OUTPATIENT
Start: 2019-11-19 | End: 2019-11-20

## 2019-11-19 RX ADMIN — Medication 1: at 12:48

## 2019-11-19 RX ADMIN — Medication 500 MILLIGRAM(S): at 11:57

## 2019-11-19 RX ADMIN — GABAPENTIN 300 MILLIGRAM(S): 400 CAPSULE ORAL at 21:54

## 2019-11-19 RX ADMIN — Medication 50 MILLIGRAM(S): at 14:43

## 2019-11-19 RX ADMIN — CARVEDILOL PHOSPHATE 6.25 MILLIGRAM(S): 80 CAPSULE, EXTENDED RELEASE ORAL at 06:39

## 2019-11-19 RX ADMIN — Medication 1 MILLIGRAM(S): at 11:57

## 2019-11-19 RX ADMIN — Medication 0.5 MILLIGRAM(S): at 06:39

## 2019-11-19 RX ADMIN — HEPARIN SODIUM 5000 UNIT(S): 5000 INJECTION INTRAVENOUS; SUBCUTANEOUS at 06:39

## 2019-11-19 RX ADMIN — TRAMADOL HYDROCHLORIDE 25 MILLIGRAM(S): 50 TABLET ORAL at 22:41

## 2019-11-19 RX ADMIN — SEVELAMER CARBONATE 800 MILLIGRAM(S): 2400 POWDER, FOR SUSPENSION ORAL at 09:39

## 2019-11-19 RX ADMIN — PANTOPRAZOLE SODIUM 40 MILLIGRAM(S): 20 TABLET, DELAYED RELEASE ORAL at 06:39

## 2019-11-19 RX ADMIN — Medication 50 MILLIGRAM(S): at 06:39

## 2019-11-19 RX ADMIN — ALBUTEROL 2 PUFF(S): 90 AEROSOL, METERED ORAL at 10:39

## 2019-11-19 RX ADMIN — Medication 50 MILLIGRAM(S): at 21:54

## 2019-11-19 RX ADMIN — SEVELAMER CARBONATE 800 MILLIGRAM(S): 2400 POWDER, FOR SUSPENSION ORAL at 11:57

## 2019-11-19 RX ADMIN — Medication 80 MILLIGRAM(S): at 06:39

## 2019-11-19 RX ADMIN — TRAMADOL HYDROCHLORIDE 25 MILLIGRAM(S): 50 TABLET ORAL at 21:53

## 2019-11-19 RX ADMIN — SIMVASTATIN 40 MILLIGRAM(S): 20 TABLET, FILM COATED ORAL at 21:54

## 2019-11-19 RX ADMIN — CLOPIDOGREL BISULFATE 75 MILLIGRAM(S): 75 TABLET, FILM COATED ORAL at 11:57

## 2019-11-19 RX ADMIN — ISOSORBIDE MONONITRATE 30 MILLIGRAM(S): 60 TABLET, EXTENDED RELEASE ORAL at 11:57

## 2019-11-19 RX ADMIN — Medication 20 MILLIGRAM(S): at 11:57

## 2019-11-19 RX ADMIN — MIRTAZAPINE 7.5 MILLIGRAM(S): 45 TABLET, ORALLY DISINTEGRATING ORAL at 21:54

## 2019-11-19 RX ADMIN — RISPERIDONE 1 MILLIGRAM(S): 4 TABLET ORAL at 06:39

## 2019-11-19 NOTE — BEHAVIORAL HEALTH ASSESSMENT NOTE - NSBHCHARTREVIEWIMAGING_PSY_A_CORE FT
< from: US Duplex Venous Lower Ext Complete, Bilateral (11.19.19 @ 11:16) >    Impression: The calf veins and lower femoral veins are not visualized   bilaterally due to severe soft tissue edema. Otherwise, no evidence for   deep vein thrombosis.    < end of copied text >    < from: Xray Chest 1 View-PORTABLE IMMEDIATE (11.17.19 @ 23:59) >    IMPRESSION:    Pulmonary vascular congestion.    < end of copied text >

## 2019-11-19 NOTE — BEHAVIORAL HEALTH ASSESSMENT NOTE - HPI (INCLUDE ILLNESS QUALITY, SEVERITY, DURATION, TIMING, CONTEXT, MODIFYING FACTORS, ASSOCIATED SIGNS AND SYMPTOMS)
38 yo  M, single and disabled on SSI living with parents in [place], with PHx of bipolar DO and schizophrenia (per pt) f/w psychiatrist Dr. Vázquez at [facility], and MHx of morbid obesity, ESRD on HD MWF, DM2 on insulin and COPD on home O2, presents to ED c/o bilateral LE pain. Per chart, original plan was to DC pt home, but decision was made to admit him as he had no portable O2 with him in the hospital. Psych consult was called for assistance with med management, given staff reports that pt has missed multiple OP MH appts after recent discharges from this hospital. Pt is seen in ED holding area, lying in bed awake and seemingly unconcerned as bed alarm sounds loudly. Pt reports that he was dx with bipolar DO @age 16 after his only psych hospitalization, and with schizophrenia @22. Pt reports that he frequently experiences VH of "dead family" such as his grandmother, great-grandfather, friends and "people I don't even know." He states that he has been seeing Dr. Vázquez at [facility] for the past 2 yrs and finds his care helpful, but has not seen him since May due to "transportation problems" (pt says that [facility] either does not show up to take him to his appointments, or does not come to the clinic to take him back home). Pt is able to name the medications he takes at home, and says he has a supply of them there, but has not been receiving the medications while in this hospital. Pt asks for his medications to be continued. Pt describes mood as "OK" and denies SI/HI/AVH.

## 2019-11-19 NOTE — PROGRESS NOTE ADULT - ASSESSMENT
40 y/o male admitted to medicine for chronic LE pain, unable to be safely discharged as he did not have portable o2 with him.

## 2019-11-19 NOTE — PROGRESS NOTE ADULT - SUBJECTIVE AND OBJECTIVE BOX
MEDICAL ATTENDING NOTE  Patient is a 39y old  Male who presents with a chief complaint of b/l LE pain (19 Nov 2019 11:19)      HPI:  38 y/o male from home, ambulates with cane with PMHx of ESRD (on MWF HD via LUE AVF), DM (on insulin), cocaine abuse, COPD on home O2 (2L NC), morbid obesity, Bipolar disorder, schizophrenia and HTN, former smoker with 30 pack history presents to the ED with chief complaint of bilateral LE pain. He describes having chronic LE pain, but describes it as feeling worse than when he was discharged recently from [facility]. Patient was cleared for discharge by ED, however had no portable oxygen with him, and decision was made to admit the patient. At present, he continues to complain of b/l LE pain and persistently requests opioid pain relief. He has no other complaints at this time.    GOC: Full code (18 Nov 2019 11:27)      INTERVAL HPI/OVERNIGHT EVENTS: still c/o thigh pain  MEDICATIONS  (STANDING):  ascorbic acid 500 milliGRAM(s) Oral daily  carvedilol 6.25 milliGRAM(s) Oral every 12 hours  chlorhexidine 4% Liquid 1 Application(s) Topical <User Schedule>  clonazePAM  Tablet 0.5 milliGRAM(s) Oral every 12 hours  clopidogrel Tablet 75 milliGRAM(s) Oral daily  epoetin cyndie Injectable 4000 Unit(s) IV Push <User Schedule>  folic acid 1 milliGRAM(s) Oral daily  furosemide    Tablet 80 milliGRAM(s) Oral daily  gabapentin 300 milliGRAM(s) Oral at bedtime  heparin  Injectable 5000 Unit(s) SubCutaneous every 12 hours  hydrALAZINE 50 milliGRAM(s) Oral three times a day  insulin lispro (HumaLOG) corrective regimen sliding scale   SubCutaneous three times a day before meals  isosorbide   mononitrate ER Tablet (IMDUR) 30 milliGRAM(s) Oral daily  mirtazapine 7.5 milliGRAM(s) Oral at bedtime  pantoprazole    Tablet 40 milliGRAM(s) Oral before breakfast  PARoxetine 20 milliGRAM(s) Oral daily  risperiDONE   Tablet 1 milliGRAM(s) Oral every 12 hours  sevelamer carbonate 800 milliGRAM(s) Oral three times a day with meals  simvastatin 40 milliGRAM(s) Oral at bedtime    MEDICATIONS  (PRN):  ALBUTerol    90 MICROgram(s) HFA Inhaler 2 Puff(s) Inhalation every 6 hours PRN Shortness of Breath and/or Wheezing  methocarbamol 500 milliGRAM(s) Oral every 12 hours PRN spasms  traMADol 25 milliGRAM(s) Oral every 8 hours PRN Severe Pain (7 - 10)      __________________________________________________  REVIEW OF SYSTEMS:    CONSTITUTIONAL: No fever,   EYES: no acute visual disturbances  NECK: No pain or stiffness  RESPIRATORY: No cough; No shortness of breath  CARDIOVASCULAR: No chest pain, no palpitations  GASTROINTESTINAL: No pain. No nausea or vomiting; No diarrhea   NEUROLOGICAL: No headache or numbness, no tremors  MUSCULOSKELETAL: No joint pain, no muscle pain  GENITOURINARY: no dysuria, no frequency, no hesitancy  PSYCHIATRY: no depression , no anxiety  ALL OTHER  ROS negative        Vital Signs Last 24 Hrs  T(C): 36.9 (19 Nov 2019 12:11), Max: 37.6 (18 Nov 2019 23:00)  T(F): 98.5 (19 Nov 2019 12:11), Max: 99.6 (18 Nov 2019 23:00)  HR: 90 (19 Nov 2019 12:11) (84 - 94)  BP: 137/60 (19 Nov 2019 12:11) (115/55 - 182/95)  BP(mean): --  RR: 19 (19 Nov 2019 12:11) (16 - 20)  SpO2: 99% (19 Nov 2019 12:11) (99% - 100%)    ________________________________________________  PHYSICAL EXAM:  GENERAL: Somnolent, obese man using nasal oxygen  HEENT: Normocephalic;  conjunctivae and sclerae clear; moist mucous membranes;   NECK : supple  CHEST/LUNG: Clear to auscultation bilaterally with good air entry   HEART: S1 S2  regular; no murmurs, gallops or rubs  ABDOMEN: Soft, Nontender, Nondistended; Bowel sounds present  EXTREMITIES: no cyanosis; less edema, tenderness to touch in all medical LE  SKIN: warm and dry; no rash  NERVOUS SYSTEM:  Awake and alert; Oriented  to place, person and time ; no new deficits    _________________________________________________  LABS:                        9.4    9.42  )-----------( 362      ( 19 Nov 2019 06:15 )             32.6     11-19    136  |  99  |  54<H>  ----------------------------<  84  5.7<H>   |  28  |  7.91<H>    Ca    9.8      19 Nov 2019 06:15    TPro  8.2  /  Alb  2.7<L>  /  TBili  0.5  /  DBili  x   /  AST  14  /  ALT  19  /  AlkPhos  237<H>  11-18        CAPILLARY BLOOD GLUCOSE      POCT Blood Glucose.: 161 mg/dL (19 Nov 2019 11:57)  POCT Blood Glucose.: 100 mg/dL (19 Nov 2019 08:25)  POCT Blood Glucose.: 95 mg/dL (19 Nov 2019 01:03)

## 2019-11-19 NOTE — BEHAVIORAL HEALTH ASSESSMENT NOTE - OTHER PAST PSYCHIATRIC HISTORY (INCLUDE DETAILS REGARDING ONSET, COURSE OF ILLNESS, INPATIENT/OUTPATIENT TREATMENT)
PHx: Bipolar DO, schizophrenia  IP: 1 hospitalization @16, none since  OP: Sees psychiatrist Dr. V[facility]quez @[facility] (most recently 5/2019)  Rx: Paxil 20 mg qhs, Risperdal 1 mg q12h, Seroquel 25 mg qhs, Remeron 7.5 mg qhs

## 2019-11-19 NOTE — BEHAVIORAL HEALTH ASSESSMENT NOTE - RISK ASSESSMENT
Low Acute Suicide Risk Risk factors: Chronic pain, disability. Protective factors: Stable domicile with family, has benefits, engaged in care and taking medications. Risk level appears low at the time of assessment.

## 2019-11-19 NOTE — BEHAVIORAL HEALTH ASSESSMENT NOTE - NSBHSOCIALHXDETAILSFT_PSY_A_CORE
B/R in [place]. Has 1 brother and 1 sister who now live in [place] and [place] respectively. Lives with parents in [place]. Attended HS through 11th grade, received diploma. Formerly worked as , but has not worked since going on disability for ESRD 4-5 yrs ago.

## 2019-11-19 NOTE — BEHAVIORAL HEALTH ASSESSMENT NOTE - NSBHCHARTREVIEWLAB_PSY_A_CORE FT
11-19    136  |  99  |  54<H>  ----------------------------<  84  5.7<H>   |  28  |  7.91<H>    Ca    9.8      19 Nov 2019 06:15    TPro  8.2  /  Alb  2.7<L>  /  TBili  0.5  /  DBili  x   /  AST  14  /  ALT  19  /  AlkPhos  237<H>  11-18    Complete Blood Count + Automated Diff in AM (11.19.19 @ 06:15)    WBC Count: 9.42 K/uL    RBC Count: 3.44 M/uL    Hemoglobin: 9.4 g/dL    Hematocrit: 32.6 %    Mean Cell Volume: 94.8 fl    Mean Cell Hemoglobin: 27.3 pg    Mean Cell Hemoglobin Conc: 28.8 gm/dL    Red Cell Distrib Width: 18.4 %    Platelet Count - Automated: 362 K/uL    Auto Neutrophil #: 6.47 K/uL    Auto Lymphocyte #: 0.91 K/uL    Auto Monocyte #: 1.39 K/uL    Auto Eosinophil #: 0.58 K/uL    Auto Basophil #: 0.04 K/uL    Auto Neutrophil %: 68.6: Differential percentages must be correlated with absolute numbers for  clinical significance. %    Auto Lymphocyte %: 9.7 %    Auto Monocyte %: 14.8 %    Auto Eosinophil %: 6.2 %    Auto Basophil %: 0.4 %    Auto Immature Granulocyte %: 0.3 %    Nucleated RBC: 0 /100 WBCs

## 2019-11-19 NOTE — BEHAVIORAL HEALTH ASSESSMENT NOTE - SUMMARY
38 yo  M, single and disabled on SSI living with parents in [place], with PHx of bipolar DO and schizophrenia (per pt) f/w psychiatrist Dr. Vázquez at \[facility]", and MHx of morbid obesity, ESRD on HD MWF, DM2 on insulin and COPD on home O2, presents to ED c/o bilateral LE pain. Per chart, original plan was to DC pt home, but decision was made to admit him as he had no portable O2 with him in the hospital. Psych consult was called for assistance with med management, given staff reports that pt has missed multiple OP MH appts after recent discharges from this hospital. On exam in ED, pt presents calm and grossly euthymic, and appears reasonably well-acquainted with his psychiatric medication regimen. Pt appears likely to benefit from restarting his home psych meds while in the hospital, with plan to f/u with existing OP psychiatrist Dr. Vázquez at \[facility]\"" after DC from this hospital. Pt does not appear to present an acute risk of harm to self or others at the time of assessment, and does not appear to be in need of admission to IP psych at the time of assessment.

## 2019-11-19 NOTE — BEHAVIORAL HEALTH ASSESSMENT NOTE - NS ED BHA MED ROS EYES
[facility] Emergency Department  [address] [address]  Phone:  [phone]  Fax:  [phone]                                    Angeles Duenas   MRN: [id number]    Department:  [facility] Emergency Department   Date of Visit:  7/24/2019           After Visit Summary Signature Page    I have received my discharge instructions, and my questions have been answered. I have discussed any challenges I see with this plan with the nurse or doctor.    ..........................................................................................................................................  Patient/Patient Representative Signature      ..........................................................................................................................................  Patient Representative Print Name and Relationship to Patient    ..................................................               ................................................  Date                                   Time    ..........................................................................................................................................  Reviewed by Signature/Title    ...................................................              ..............................................  Date                                               Time          22EPIC Rev 08/18       
No complaints

## 2019-11-19 NOTE — DISCHARGE NOTE PROVIDER - NSDCCPCAREPLAN_GEN_ALL_CORE_FT
PRINCIPAL DISCHARGE DIAGNOSIS  Diagnosis: Pain in both lower extremities  Assessment and Plan of Treatment: Chronic , likely due to edema  Negative deep vein thrombosis in bilateral low extremities  Continue with pain regimen.  Continue with diuretics as prescribed by your doctor.  Elevate legs .  Activity as tolerated, physical therapy as instructed.  Follow-up with your primary care physician within 1 week. Call for appointment.        SECONDARY DISCHARGE DIAGNOSES  Diagnosis: ESRD on dialysis  Assessment and Plan of Treatment: Continue with your dialysis treatments. Follow with your nephrologist (kidney physician). Continue your medications as prescribed.      Diagnosis: Bipolar disorder  Assessment and Plan of Treatment: COntinue with medications as prescribed by your doctor.  Follow-up with your primary care physician within 1 week. Call for appointment.      Diagnosis: DM (diabetes mellitus)  Assessment and Plan of Treatment: You A1C was 5.3 in Oct 2019. Have your A1C checked in 3 months.  If you take oral diabetes medications, check your blood glucose two times a day.  If you take insulin, check your blood glucose before meals and at bedtime.  It's important not to skip any meals.  Keep a log of your blood glucose results and always take it with you to your doctor appointments.  Keep a list of your current medications including injectables and over the counter medications and bring this medication list with you to all your doctor appointments.  If you have not seen your ophthalmologist this year call for appointment.  Check your feet daily for redness, sores, or openings. Do not self treat. If no improvement in two days call your primary care physician for an appointment.  Low blood sugar (hypoglycemia) is a blood sugar below 70mg/dl. Check your blood sugar if you feel signs/symptoms of hypoglycemia. If your blood sugar is below 70 take 15 grams of carbohydrates (ex 4 oz of apple juice, 3-4 glucose tablets, or 4-6 oz of regular soda) wait 15 minutes and repeat blood sugar to make sure it comes up above 70.  If your blood sugar is above 70 and you are due for a meal, have a meal.  If you are not due for a meal have a snack.  This snack helps keeps your blood sugar at a safe range.         Diagnosis: COPD (chronic obstructive pulmonary disease)  Assessment and Plan of Treatment: Call your Health Care provider upon arrival home to make a follow up appointment within one week.  Take all inhalers as prescribed by your Health Care Provider.  Take steroids as prescribed by your Health Care Provider.  If your cough increases infrequency and severity and/or you have shortness of breath or increased shortness of breath call your Health Care Provider.  If you develop fever, chills, night sweats, malaise, and/or change in mental status call your Health care Provider.  Nutrition is very important.  Eat small frequent meals.  Increase your activity as tolerated.  Do not stay in bed all day PRINCIPAL DISCHARGE DIAGNOSIS  Diagnosis: Pain in both lower extremities  Assessment and Plan of Treatment: Chronic , likely due to edema  Negative deep vein thrombosis in bilateral low extremities  Continue with pain regimen.  Continue with diuretics as prescribed by your doctor.  Elevate legs .  Activity as tolerated, physical therapy as instructed.  Follow-up with your primary care physician within 1 week. Call for appointment.        SECONDARY DISCHARGE DIAGNOSES  Diagnosis: ESRD on dialysis  Assessment and Plan of Treatment: Continue with your dialysis treatments. Follow with your nephrologist (kidney physician). Continue your medications as prescribed.       Diagnosis: DM (diabetes mellitus)  Assessment and Plan of Treatment: You A1C was 5.3 in Oct 2019. Have your A1C checked in 3 months.  If you take oral diabetes medications, check your blood glucose two times a day.  If you take insulin, check your blood glucose before meals and at bedtime.  It's important not to skip any meals.  Keep a log of your blood glucose results and always take it with you to your doctor appointments.  Keep a list of your current medications including injectables and over the counter medications and bring this medication list with you to all your doctor appointments.  If you have not seen your ophthalmologist this year call for appointment.  Check your feet daily for redness, sores, or openings. Do not self treat. If no improvement in two days call your primary care physician for an appointment.  Low blood sugar (hypoglycemia) is a blood sugar below 70mg/dl. Check your blood sugar if you feel signs/symptoms of hypoglycemia. If your blood sugar is below 70 take 15 grams of carbohydrates (ex 4 oz of apple juice, 3-4 glucose tablets, or 4-6 oz of regular soda) wait 15 minutes and repeat blood sugar to make sure it comes up above 70.  If your blood sugar is above 70 and you are due for a meal, have a meal.  If you are not due for a meal have a snack.  This snack helps keeps your blood sugar at a safe range.         Diagnosis: COPD (chronic obstructive pulmonary disease)  Assessment and Plan of Treatment: Call your Health Care provider upon arrival home to make a follow up appointment within one week.  Take all inhalers as prescribed by your Health Care Provider.  Take steroids as prescribed by your Health Care Provider.  If your cough increases infrequency and severity and/or you have shortness of breath or increased shortness of breath call your Health Care Provider.  If you develop fever, chills, night sweats, malaise, and/or change in mental status call your Health care Provider.  Nutrition is very important.  Eat small frequent meals.  Increase your activity as tolerated.  Do not stay in bed all day. Folow up witH Psychiatrist Dr. Hutson      Diagnosis: Bipolar disorder  Assessment and Plan of Treatment: COntinue with medications as prescribed by your doctor.  Follow-up with your primary care physician within 1 week. Call for appointment.

## 2019-11-19 NOTE — PROGRESS NOTE ADULT - ASSESSMENT
# end stage renal disease. S/P HD yesterday. short of breath and hyperkalemic. grossly fluid overloaded. HD today as with ultrafiltration as tolerated .    #HTN controlled  # anemia of CKD. cont procrit  # schizophrenia. cont current medications  # chronic leg pains. getting doppler sono

## 2019-11-19 NOTE — PROGRESS NOTE ADULT - SUBJECTIVE AND OBJECTIVE BOX
[facility] : Progress Note :: [phone], (office [phone]),   Dr La / Dr Hui / Dr Barber / Dr Villalobos / Dr Hang CASTELLANO / Dr Mehta / Dr Sanchez / Dr Alber dumont  _____________________________________________________________________________________________  S/P HD yesterday . C/O SOB    aspirin (Short breath)  aspirin (Swelling)  fish (Unknown)  penicillin (Short breath)  penicillins (Swelling)  shellfish (Unknown)    Hospital Medications:   MEDICATIONS  (STANDING):  ascorbic acid 500 milliGRAM(s) Oral daily  carvedilol 6.25 milliGRAM(s) Oral every 12 hours  chlorhexidine 4% Liquid 1 Application(s) Topical <User Schedule>  clonazePAM  Tablet 0.5 milliGRAM(s) Oral every 12 hours  clopidogrel Tablet 75 milliGRAM(s) Oral daily  epoetin cyndie Injectable 4000 Unit(s) IV Push <User Schedule>  folic acid 1 milliGRAM(s) Oral daily  furosemide    Tablet 80 milliGRAM(s) Oral daily  gabapentin 300 milliGRAM(s) Oral at bedtime  heparin  Injectable 5000 Unit(s) SubCutaneous every 12 hours  hydrALAZINE 50 milliGRAM(s) Oral three times a day  insulin lispro (HumaLOG) corrective regimen sliding scale   SubCutaneous three times a day before meals  isosorbide   mononitrate ER Tablet (IMDUR) 30 milliGRAM(s) Oral daily  mirtazapine 7.5 milliGRAM(s) Oral at bedtime  pantoprazole    Tablet 40 milliGRAM(s) Oral before breakfast  PARoxetine 20 milliGRAM(s) Oral daily  risperiDONE   Tablet 1 milliGRAM(s) Oral every 12 hours  sevelamer carbonate 800 milliGRAM(s) Oral three times a day with meals  simvastatin 40 milliGRAM(s) Oral at bedtime        VITALS:  T(F): 98.4 (11-19-19 @ 07:38), Max: 99.6 (11-18-19 @ 23:00)  HR: 94 (11-19-19 @ 07:38)  BP: 182/95 (11-19-19 @ 07:38)  RR: 16 (11-19-19 @ 07:38)  SpO2: 100% (11-19-19 @ 07:38)  Wt(kg): --      PHYSICAL EXAM:  Constitutional: NAD  HEENT: anicteric sclera, oropharynx clear.  Neck: No JVD  Respiratory: CTAB, no wheezes, rales or rhonchi  Cardiovascular: S1, S2, RRR  Gastrointestinal: BS+, soft, NT/ND  Extremities: chronic stasis changes. tenderness++  peripheral edema++  Neurological: A/O x 3, no focal deficits  Vascular Access: AVF with thrill and bruit    LABS:  11-19    136  |  99  |  54<H>  ----------------------------<  84  5.7<H>   |  28  |  7.91<H>    Ca    9.8      19 Nov 2019 06:15    TPro  8.2  /  Alb  2.7<L>  /  TBili  0.5  /  DBili      /  AST  14  /  ALT  19  /  AlkPhos  237<H>  11-18    Creatinine Trend: 7.91 <--, 9.14 <--, 10.20 <--                        9.4    9.42  )-----------( 362      ( 19 Nov 2019 06:15 )             32.6     Urine Studies:      RADIOLOGY & ADDITIONAL STUDIES:

## 2019-11-19 NOTE — BEHAVIORAL HEALTH ASSESSMENT NOTE - SUICIDE PROTECTIVE FACTORS
Identifies reasons for living/Has future plans/Supportive social network of family or friends/Cultural, spiritual and/or moral attitudes against suicide/Positive therapeutic relationships/Responsibility to family and others

## 2019-11-19 NOTE — BEHAVIORAL HEALTH ASSESSMENT NOTE - NSBHCHARTREVIEWVS_PSY_A_CORE FT
Vital Signs Last 24 Hrs  T(C): 36.8 (19 Nov 2019 15:56), Max: 37.6 (18 Nov 2019 23:00)  T(F): 98.3 (19 Nov 2019 15:56), Max: 99.6 (18 Nov 2019 23:00)  HR: 88 (19 Nov 2019 15:56) (88 - 94)  BP: 147/74 (19 Nov 2019 15:56) (115/55 - 182/95)  BP(mean): --  RR: 16 (19 Nov 2019 15:56) (16 - 19)  SpO2: 98% (19 Nov 2019 15:56) (98% - 100%)

## 2019-11-19 NOTE — BEHAVIORAL HEALTH ASSESSMENT NOTE - NSBHCHARTREVIEWINVESTIGATE_PSY_A_CORE FT
< from: 12 Lead ECG (11.17.19 @ 23:22) >    QTC Calculation(Bezet) 445 ms    P Axis 21 degrees    R Axis -28 degrees    T Axis 46 degrees    Diagnosis Line Normal sinus rhythm  Normal ECG    < end of copied text >

## 2019-11-19 NOTE — PROGRESS NOTE ADULT - ATTENDING COMMENTS
This is one of several admissions for this 39y old  Male who recurrently has a complaint of b/l LE pain (18 Nov 2019 15:05)  Patient was seen and examined at bedside  Still complains of BL leg pain, denies any other complains   Vitals stable, had dialysis today    REVIEW OF SYSTEMS: denies fever, chills, SOB, palpitations, chest pain, abdominal pain, nausea, vomiting, diarrhea, constipation, dizziness    PHYSICAL EXAM:  Vital Signs Last 24 Hrs  T(C): 36.9 (19 Nov 2019 12:11), Max: 37.6 (18 Nov 2019 23:00)  T(F): 98.5 (19 Nov 2019 12:11), Max: 99.6 (18 Nov 2019 23:00)  HR: 90 (19 Nov 2019 12:11) (84 - 94)  BP: 137/60 (19 Nov 2019 12:11) (115/55 - 182/95)  BP(mean): --  RR: 19 (19 Nov 2019 12:11) (16 - 20)  SpO2: 99% (19 Nov 2019 12:11) (99% - 100%)  GENERAL: morbidly obese, somnolent  NERVOUS SYSTEM:  Alert & Oriented X3, Good concentration  CHEST/LUNG: Clear to percussion bilaterally; No rales, rhonchi, wheezing, or rubs  HEART: Regular rate and rhythm; No murmurs, rubs, or gallops  ABDOMEN: Soft, Nontender, Nondistended; Bowel sounds present  EXTREMITIES: BL LE lymphedema and tenderness  SKIN: no rash         Assessment and plan:   1. BL LE pain  Chronic pain  Cont Tramadol  Cont Gabapentin  Consult Pain management    2. ESRD  Cont HD as per schedule.  Dr. La's note, appreciated.     3. HTN  Cont Home meds with holding parameters    4. COPD: cont PRN albuterol     5. Bipolar D: Patient is on Risperdal, Paroxetine, Clonazepam and Mirtazapine; Since patient missed last week's outpatient psych appointment and this week's outpatient psych appointment, I have asked Psychiatry, Dr. Sharma, to see the patient.     6. Renal diet, Heparin SQ for DVT Prophylaxis, Pain management clinic appointment on discharge

## 2019-11-19 NOTE — BEHAVIORAL HEALTH ASSESSMENT NOTE - NSBHCONSULTRECOMMENDOTHER_PSY_A_CORE FT
1. Restart home psych meds: Paxil 20 mg qhs, Risperdal 1 mg q12h, Seroquel 25 mg qhs, Remeron 7.5 mg qhs  2. Med management as directed by primary team  3. Pt should be advised to f/u with his psychiatrist Dr. Vázquez @[facility] after DC  4. Psychiatry is signing off. Reconsult if additional issues arise as inpatient  5. Case d/w Dr. Loomis of primary team    Renae Sharma MD  Director, Consultation-Liaison Psychiatry Service  [phone]

## 2019-11-19 NOTE — DISCHARGE NOTE PROVIDER - NSDCFUSCHEDAPPT_GEN_ALL_CORE_FT
PRESTON JOHNSON ; 01/03/2020 ; [facility] [address]  PRESTON JOHNSON ; 01/03/2020 ; [facility] [address] [address] PRESTNO JOHNSON ; 04/10/2020 ; N[facility] [address] [address]

## 2019-11-19 NOTE — DISCHARGE NOTE PROVIDER - NSDCMRMEDTOKEN_GEN_ALL_CORE_FT
carvedilol 6.25 mg oral tablet: 1 tab(s) orally every 12 hours  clonazePAM 0.5 mg oral tablet: 1 tab(s) orally every 12 hours MDD:2 tabs per day  epoetin cyndie: 4000 unit(s) intravenously Monday, Wednesday, and Friday in   FeroSul 325 mg (65 mg elemental iron) oral tablet: 1 tab(s) orally 2 times a day x 30 days   folic acid 1 mg oral tablet: 1 tab(s) orally once a day  gabapentin 300 mg oral capsule: 1 cap(s) orally once a day (at bedtime)   hydrALAZINE 50 mg oral tablet: 1 tab(s) orally 3 times a day  ipratropium-albuterol 0.5 mg-2.5 mg/3 mLinhalation solution: 3 milliliter(s) inhaled every 8 hours   isosorbide mononitrate 30 mg oral tablet, extended release: 1 tab(s) orally once a day  Lasix 80 mg oral tablet: 1 tab(s) orally once a day  methocarbamol 500 mg oral tablet: 1 tab(s) orally every 12 hours, As needed, spasms  mirtazapine 7.5 mg oral tablet: 1 tab(s) orally once a day (at bedtime)  NovoLOG 100 units/mL subcutaneous solution: 15 unit(s) subcutaneous 3 times a day (before meals)  pantoprazole 40 mg oral delayed release tablet: 1 tab(s) orally once a day (before a meal)  PARoxetine 20 mg oral tablet: 1 tab(s) orally once a day (at bedtime)  Plavix 75 mg oral tablet: 1 tab(s) orally once a day  risperiDONE 1 mg oral tablet: 1 tab(s) orally every 12 hours  sevelamer carbonate 800 mg oral tablet: 1 tab(s) orally 3 times a day (with meals)  simvastatin 40 mg oral tablet: 1 tab(s) orally once a day (at bedtime)  Vitamin C 500 mg oral tablet: 1 tab(s) orally once a day carvedilol 6.25 mg oral tablet: 1 tab(s) orally every 12 hours  clonazePAM 0.5 mg oral tablet: 1 tab(s) orally every 12 hours MDD:2 tabs per day  epoetin cyndie: 4000 unit(s) intravenously Monday, Wednesday, and Friday in HD  FeroSul 325 mg (65 mg elemental iron) oral tablet: 1 tab(s) orally 2 times a day x 30 days   folic acid 1 mg oral tablet: 1 tab(s) orally once a day  gabapentin 300 mg oral capsule: 1 cap(s) orally once a day (at bedtime)   hydrALAZINE 50 mg oral tablet: 1 tab(s) orally 3 times a day  ipratropium-albuterol 0.5 mg-2.5 mg/3 mLinhalation solution: 3 milliliter(s) inhaled every 8 hours   isosorbide mononitrate 30 mg oral tablet, extended release: 1 tab(s) orally once a day  Lasix 80 mg oral tablet: 1 tab(s) orally once a day  methocarbamol 500 mg oral tablet: 1 tab(s) orally every 12 hours, As needed, spasms  mirtazapine 7.5 mg oral tablet: 1 tab(s) orally once a day (at bedtime)  NovoLOG 100 units/mL subcutaneous solution: 15 unit(s) subcutaneous 3 times a day (before meals)  pantoprazole 40 mg oral delayed release tablet: 1 tab(s) orally once a day (before a meal)  PARoxetine 20 mg oral tablet: 1 tab(s) orally once a day (at bedtime)  Plavix 75 mg oral tablet: 1 tab(s) orally once a day  risperiDONE 1 mg oral tablet: 1 tab(s) orally every 12 hours  sevelamer carbonate 800 mg oral tablet: 1 tab(s) orally 3 times a day (with meals)  simvastatin 40 mg oral tablet: 1 tab(s) orally once a day (at bedtime)  traMADol 50 mg oral tablet: 0.5 tab(s) orally every 8 hours, As needed, Severe Pain (7 - 10)  Vitamin C 500 mg oral tablet: 1 tab(s) orally once a day carvedilol 6.25 mg oral tablet: 1 tab(s) orally every 12 hours  clonazePAM 0.5 mg oral tablet: 1 tab(s) orally every 12 hours MDD:2 tabs per day  epoetin cyndie: 4000 unit(s) intravenously Monday, Wednesday, and Friday in HD  FeroSul 325 mg (65 mg elemental iron) oral tablet: 1 tab(s) orally 2 times a day x 30 days   folic acid 1 mg oral tablet: 1 tab(s) orally once a day  gabapentin 300 mg oral capsule: 1 cap(s) orally once a day (at bedtime)   hydrALAZINE 50 mg oral tablet: 1 tab(s) orally 3 times a day  ipratropium-albuterol 0.5 mg-2.5 mg/3 mLinhalation solution: 3 milliliter(s) inhaled every 8 hours   isosorbide mononitrate 30 mg oral tablet, extended release: 1 tab(s) orally once a day  Lasix 80 mg oral tablet: 1 tab(s) orally once a day  methocarbamol 500 mg oral tablet: 1 tab(s) orally every 12 hours, As needed, spasms  mirtazapine 7.5 mg oral tablet: 1 tab(s) orally once a day (at bedtime)  pantoprazole 40 mg oral delayed release tablet: 1 tab(s) orally once a day (before a meal)  PARoxetine 20 mg oral tablet: 1 tab(s) orally once a day (at bedtime)  Percocet 5/325 oral tablet: 1 tab(s) orally every 8 hours MDD:mdd 3 tab  Plavix 75 mg oral tablet: 1 tab(s) orally once a day  risperiDONE 1 mg oral tablet: 1 tab(s) orally every 12 hours  sevelamer carbonate 800 mg oral tablet: 1 tab(s) orally 3 times a day (with meals)  simvastatin 40 mg oral tablet: 1 tab(s) orally once a day (at bedtime)  traMADol 50 mg oral tablet: 0.5 tab(s) orally every 8 hours, As needed, Severe Pain (7 - 10)  Vitamin C 500 mg oral tablet: 1 tab(s) orally once a day ascorbic acid 500 mg oral tablet: 1 tab(s) orally once a day  carvedilol 6.25 mg oral tablet: 1 tab(s) orally every 12 hours  clonazePAM 0.5 mg oral tablet: 1 tab(s) orally every 12 hours  clopidogrel 75 mg oral tablet: 1 tab(s) orally once a day  epoetin cyndie: 70187 unit(s) intravenous 3 times a week with HD  ferrous sulfate 325 mg (65 mg elemental iron) oral delayed release tablet: 1 tab(s) orally once a day  folic acid 1 mg oral tablet: 1 tab(s) orally once a day  furosemide 80 mg oral tablet: 1 tab(s) orally once a day  gabapentin 300 mg oral capsule: 1 cap(s) orally once a day (at bedtime)  ipratropium-albuterol 0.5 mg-2.5 mg/3 mLinhalation solution: 3 milliliter(s) inhaled every 8 hours  NOTE: last dispensed June 2019   isosorbide mononitrate 30 mg oral tablet, extended release: 1 tab(s) orally once a day  methocarbamol 500 mg oral tablet: 1 tab(s) orally every 12 hours, As needed, spasms  mirtazapine 7.5 mg oral tablet: 1 tab(s) orally once a day (at bedtime)  NOTE: last dispensed May 2019  pantoprazole 40 mg oral delayed release tablet: 1 tab(s) orally once a day (before a meal)  PARoxetine 20 mg oral tablet: 1 tab(s) orally once a day  Pataday 0.2% ophthalmic solution: 1 drop(s) in each eye once a day  polyethylene glycol 3350 oral powder for reconstitution: 17 gram(s) orally once a day  Restasis 0.05% ophthalmic emulsion: 1 drop(s) in each eye once a day  risperiDONE 1 mg oral tablet: 1 tab(s) orally every 12 hours  senna oral tablet: 2 tab(s) orally once a day (at bedtime)  sevelamer carbonate 800 mg oral tablet: 3 tab(s) orally 3 times a day (with meals)  simvastatin 40 mg oral tablet: 1 tab(s) orally once a day (at bedtime)  Symbicort 160 mcg-4.5 mcg/inh inhalation aerosol: 2 puff(s) inhaled 2 times a day  traMADol 50 mg oral tablet: 0.5 tab(s) orally every 8 hours, As needed, Severe Pain (7 - 10)

## 2019-11-19 NOTE — DISCHARGE NOTE PROVIDER - CARE PROVIDER_API CALL
Eliceo Coburn)  Family Medicine  [address] [place] [address]  Phone: [phone]  Fax: [phone]  Follow Up Time: 1 week

## 2019-11-19 NOTE — DISCHARGE NOTE PROVIDER - HOSPITAL COURSE
38 y/o male from home, ambulates with cane with PMHx of ESRD (on MWF HD via LUE AVF), DM (on insulin), cocaine abuse, COPD on home O2 (2L NC), morbid obesity, Bipolar disorder, schizophrenia and HTN, former smoker with 30 pack history  who presented to  the ED with chief complaint of  worsening bilateral LE pain since recent discharge from [facility].  BLE Venous duplex shows no DVT .             Patient was cleared for discharge by ED, however had no portable oxygen with him, and decision was made to admit the patient.     Pain Service consulted.             __________incomplete--------------- 38 y/o male from home, ambulates with cane with PMHx of ESRD (on MWF HD via LUE AVF), DM (on insulin), cocaine abuse, COPD on home O2 (2L NC), morbid obesity, Bipolar disorder, schizophrenia and HTN, former smoker with 30 pack history  who presented to  the ED with chief complaint of  worsening bilateral LE pain since recent discharge from [facility].  BLE Venous duplex shows no DVT .             Patient was cleared for discharge by ED, however had no portable oxygen with him, and decision was made to admit the patient.     Pain Service consulted.         He was seen by the Physical therapy who recommended Home PT.     He was also seen by the Nephrologist and was recommended OP dialysis.     He was give pain medications as per Pain managenment consult from previous admission.

## 2019-11-20 ENCOUNTER — TRANSCRIPTION ENCOUNTER (OUTPATIENT)
Age: 39
End: 2019-11-20

## 2019-11-20 VITALS
RESPIRATION RATE: 18 BRPM | SYSTOLIC BLOOD PRESSURE: 145 MMHG | TEMPERATURE: 98 F | HEART RATE: 92 BPM | DIASTOLIC BLOOD PRESSURE: 74 MMHG | OXYGEN SATURATION: 96 %

## 2019-11-20 LAB
ANION GAP SERPL CALC-SCNC: 9 MMOL/L — SIGNIFICANT CHANGE UP (ref 5–17)
BUN SERPL-MCNC: 48 MG/DL — HIGH (ref 7–18)
CALCIUM SERPL-MCNC: 10.1 MG/DL — SIGNIFICANT CHANGE UP (ref 8.4–10.5)
CHLORIDE SERPL-SCNC: 98 MMOL/L — SIGNIFICANT CHANGE UP (ref 96–108)
CO2 SERPL-SCNC: 28 MMOL/L — SIGNIFICANT CHANGE UP (ref 22–31)
CREAT SERPL-MCNC: 7.26 MG/DL — HIGH (ref 0.5–1.3)
GLUCOSE BLDC GLUCOMTR-MCNC: 107 MG/DL — HIGH (ref 70–99)
GLUCOSE BLDC GLUCOMTR-MCNC: 129 MG/DL — HIGH (ref 70–99)
GLUCOSE BLDC GLUCOMTR-MCNC: 136 MG/DL — HIGH (ref 70–99)
GLUCOSE SERPL-MCNC: 97 MG/DL — SIGNIFICANT CHANGE UP (ref 70–99)
HCT VFR BLD CALC: 30.3 % — LOW (ref 39–50)
HGB BLD-MCNC: 8.7 G/DL — LOW (ref 13–17)
MCHC RBC-ENTMCNC: 27.1 PG — SIGNIFICANT CHANGE UP (ref 27–34)
MCHC RBC-ENTMCNC: 28.7 GM/DL — LOW (ref 32–36)
MCV RBC AUTO: 94.4 FL — SIGNIFICANT CHANGE UP (ref 80–100)
NRBC # BLD: 0 /100 WBCS — SIGNIFICANT CHANGE UP (ref 0–0)
PLATELET # BLD AUTO: 330 K/UL — SIGNIFICANT CHANGE UP (ref 150–400)
POTASSIUM SERPL-MCNC: 5 MMOL/L — SIGNIFICANT CHANGE UP (ref 3.5–5.3)
POTASSIUM SERPL-SCNC: 5 MMOL/L — SIGNIFICANT CHANGE UP (ref 3.5–5.3)
RBC # BLD: 3.21 M/UL — LOW (ref 4.2–5.8)
RBC # FLD: 18.3 % — HIGH (ref 10.3–14.5)
SODIUM SERPL-SCNC: 135 MMOL/L — SIGNIFICANT CHANGE UP (ref 135–145)
WBC # BLD: 9.23 K/UL — SIGNIFICANT CHANGE UP (ref 3.8–10.5)
WBC # FLD AUTO: 9.23 K/UL — SIGNIFICANT CHANGE UP (ref 3.8–10.5)

## 2019-11-20 PROCEDURE — 99239 HOSP IP/OBS DSCHRG MGMT >30: CPT

## 2019-11-20 PROCEDURE — 99261: CPT

## 2019-11-20 PROCEDURE — 97161 PT EVAL LOW COMPLEX 20 MIN: CPT

## 2019-11-20 PROCEDURE — 82962 GLUCOSE BLOOD TEST: CPT

## 2019-11-20 PROCEDURE — 93005 ELECTROCARDIOGRAM TRACING: CPT

## 2019-11-20 PROCEDURE — 80053 COMPREHEN METABOLIC PANEL: CPT

## 2019-11-20 PROCEDURE — 93970 EXTREMITY STUDY: CPT

## 2019-11-20 PROCEDURE — 99285 EMERGENCY DEPT VISIT HI MDM: CPT | Mod: 25

## 2019-11-20 PROCEDURE — 85379 FIBRIN DEGRADATION QUANT: CPT

## 2019-11-20 PROCEDURE — 84484 ASSAY OF TROPONIN QUANT: CPT

## 2019-11-20 PROCEDURE — 71045 X-RAY EXAM CHEST 1 VIEW: CPT

## 2019-11-20 PROCEDURE — 96374 THER/PROPH/DIAG INJ IV PUSH: CPT

## 2019-11-20 PROCEDURE — 96372 THER/PROPH/DIAG INJ SC/IM: CPT | Mod: XU

## 2019-11-20 PROCEDURE — 36415 COLL VENOUS BLD VENIPUNCTURE: CPT

## 2019-11-20 PROCEDURE — 94640 AIRWAY INHALATION TREATMENT: CPT

## 2019-11-20 PROCEDURE — 85027 COMPLETE CBC AUTOMATED: CPT

## 2019-11-20 PROCEDURE — 80048 BASIC METABOLIC PNL TOTAL CA: CPT

## 2019-11-20 PROCEDURE — 83880 ASSAY OF NATRIURETIC PEPTIDE: CPT

## 2019-11-20 RX ORDER — TRAMADOL HYDROCHLORIDE 50 MG/1
0.5 TABLET ORAL
Qty: 0 | Refills: 0 | DISCHARGE
Start: 2019-11-20

## 2019-11-20 RX ADMIN — CARVEDILOL PHOSPHATE 6.25 MILLIGRAM(S): 80 CAPSULE, EXTENDED RELEASE ORAL at 06:07

## 2019-11-20 RX ADMIN — HEPARIN SODIUM 5000 UNIT(S): 5000 INJECTION INTRAVENOUS; SUBCUTANEOUS at 06:07

## 2019-11-20 RX ADMIN — ALBUTEROL 2 PUFF(S): 90 AEROSOL, METERED ORAL at 06:08

## 2019-11-20 RX ADMIN — ISOSORBIDE MONONITRATE 30 MILLIGRAM(S): 60 TABLET, EXTENDED RELEASE ORAL at 12:53

## 2019-11-20 RX ADMIN — SEVELAMER CARBONATE 800 MILLIGRAM(S): 2400 POWDER, FOR SUSPENSION ORAL at 12:15

## 2019-11-20 RX ADMIN — CHLORHEXIDINE GLUCONATE 1 APPLICATION(S): 213 SOLUTION TOPICAL at 06:10

## 2019-11-20 RX ADMIN — CLOPIDOGREL BISULFATE 75 MILLIGRAM(S): 75 TABLET, FILM COATED ORAL at 12:52

## 2019-11-20 RX ADMIN — RISPERIDONE 1 MILLIGRAM(S): 4 TABLET ORAL at 06:07

## 2019-11-20 RX ADMIN — Medication 500 MILLIGRAM(S): at 12:53

## 2019-11-20 RX ADMIN — Medication 80 MILLIGRAM(S): at 06:07

## 2019-11-20 RX ADMIN — Medication 0.5 MILLIGRAM(S): at 06:10

## 2019-11-20 RX ADMIN — Medication 1 MILLIGRAM(S): at 12:52

## 2019-11-20 RX ADMIN — PANTOPRAZOLE SODIUM 40 MILLIGRAM(S): 20 TABLET, DELAYED RELEASE ORAL at 06:07

## 2019-11-20 RX ADMIN — Medication 50 MILLIGRAM(S): at 06:07

## 2019-11-20 RX ADMIN — SEVELAMER CARBONATE 800 MILLIGRAM(S): 2400 POWDER, FOR SUSPENSION ORAL at 12:53

## 2019-11-20 NOTE — PROGRESS NOTE ADULT - ATTENDING COMMENTS
This is one of several admissions for this 39y old  Male who recurrently has a complaint of b/l LE pain (18 Nov 2019 15:05)  Patient was seen and examined   Still complains of left thigh pain, denies any other complains       REVIEW OF SYSTEMS: denies fever, chills, SOB, palpitations, chest pain, abdominal pain, nausea, vomiting, diarrhea, constipation, dizziness    RR: 19 (19 Nov 2019 12:11) (16 - 20)  SpO2: 99% (19 Nov 2019 12:11) (99% - 100%)  GENERAL: morbidly obese, somnolent  NERVOUS SYSTEM:  Alert & Oriented X3, Good concentration  CHEST/LUNG: Clear to percussion bilaterally; No rales, rhonchi, wheezing, or rubs  HEART: Regular rate and rhythm; No murmurs, rubs, or gallops  ABDOMEN: Soft, Nontender, Nondistended; Bowel sounds present  EXTREMITIES: BL LE lymphedema and tenderness  SKIN: no rash         Assessment and plan:   1. BL LE pain  Chronic pain  Cont Tramadol  Cont Gabapentin  Consult Pain management    2. ESRD  Cont HD as per schedule.  Dr. La's note, appreciated.     3. HTN  Cont Home meds with holding parameters    4. COPD: cont PRN albuterol     5. Bipolar D: Patient is on Risperdal, Paroxetine, Clonazepam and Mirtazapine; Since patient missed last week's outpatient psych appointment and this week's outpatient psych appointment, I have asked Psychiatry, Dr. Sharma, to see the patient.     6. Renal diet, Heparin SQ for DVT Prophylaxis, Pain management clinic appointment on discharge    7/  Morbid obesity/non-adherence.  Patient would need to lose weight before becoming a candidate for renal transplant.  His mother is a willing donor.  She is working full time and cares for him at home.   I have asked bariatric surgery to speak to patient about potential surgery.  Patient would have to be adherent with dialysis and all regimens in order to be eligible.  This may be an incentive for him to adhere.     Plan:  Discharge home.  He will have dialysis tomorrow and Friday.

## 2019-11-20 NOTE — PROGRESS NOTE ADULT - ASSESSMENT
# end stage renal disease. S/P HD yesterday. No objection to D/C home today. he will have HD in AM and friday  #HTN controlled  # anemia of CKD. cont procrit  # schizophrenia. cont current medications

## 2019-11-20 NOTE — PHYSICAL THERAPY INITIAL EVALUATION ADULT - GENERAL OBSERVATIONS, REHAB EVAL
Pt. received in supine; AxOx3; w/ O2 @ 4 li NC. Presents w/ edema to bilat LE and c/o pain on (L) LE/knee which inc. w/ movement.

## 2019-11-20 NOTE — DISCHARGE NOTE NURSING/CASE MANAGEMENT/SOCIAL WORK - PATIENT PORTAL LINK FT
You can access the FollowMyHealth Patient Portal offered by [facility] by registering at the following website: [email or web address]. By joining [employer]’s FollowMyHealth portal, you will also be able to view your health information using other applications (apps) compatible with our system.

## 2019-11-20 NOTE — PROGRESS NOTE ADULT - SUBJECTIVE AND OBJECTIVE BOX
MEDICAL ATTENDING NOTE  Patient is a 39y old  Male who presents with a chief complaint of b/l LE pain (19 Nov 2019 11:19)      HPI:  38 y/o male from home, ambulates with cane with PMHx of ESRD (on MWF HD via LUE AVF), DM (on insulin), cocaine abuse, COPD on home O2 (2L NC), morbid obesity, Bipolar disorder, schizophrenia and HTN, former smoker with 30 pack history presents to the ED with chief complaint of bilateral LE pain. He describes having chronic LE pain, but describes it as feeling worse than when he was discharged recently from [facility]. Patient was cleared for discharge by ED, however had no portable oxygen with him, and decision was made to admit the patient. At present, he continues to complain of b/l LE pain and persistently requests opioid pain relief. He has no other complaints at this time.    GOC: Full code (18 Nov 2019 11:27)      INTERVAL HPI/OVERNIGHT EVENTS: still c/o thigh pain  MEDICATIONS  (STANDING):  ascorbic acid 500 milliGRAM(s) Oral daily  carvedilol 6.25 milliGRAM(s) Oral every 12 hours  chlorhexidine 4% Liquid 1 Application(s) Topical <User Schedule>  clonazePAM  Tablet 0.5 milliGRAM(s) Oral every 12 hours  clopidogrel Tablet 75 milliGRAM(s) Oral daily  epoetin cyndie Injectable 4000 Unit(s) IV Push <User Schedule>  folic acid 1 milliGRAM(s) Oral daily  furosemide    Tablet 80 milliGRAM(s) Oral daily  gabapentin 300 milliGRAM(s) Oral at bedtime  heparin  Injectable 5000 Unit(s) SubCutaneous every 12 hours  hydrALAZINE 50 milliGRAM(s) Oral three times a day  insulin lispro (HumaLOG) corrective regimen sliding scale   SubCutaneous three times a day before meals  isosorbide   mononitrate ER Tablet (IMDUR) 30 milliGRAM(s) Oral daily  mirtazapine 7.5 milliGRAM(s) Oral at bedtime  pantoprazole    Tablet 40 milliGRAM(s) Oral before breakfast  PARoxetine 20 milliGRAM(s) Oral daily  risperiDONE   Tablet 1 milliGRAM(s) Oral every 12 hours  sevelamer carbonate 800 milliGRAM(s) Oral three times a day with meals  simvastatin 40 milliGRAM(s) Oral at bedtime    MEDICATIONS  (PRN):  ALBUTerol    90 MICROgram(s) HFA Inhaler 2 Puff(s) Inhalation every 6 hours PRN Shortness of Breath and/or Wheezing  methocarbamol 500 milliGRAM(s) Oral every 12 hours PRN spasms  traMADol 25 milliGRAM(s) Oral every 8 hours PRN Severe Pain (7 - 10)      __________________________________________________  REVIEW OF SYSTEMS:    CONSTITUTIONAL: No fever,   EYES: no acute visual disturbances  NECK: No pain or stiffness  RESPIRATORY: No cough; No shortness of breath  CARDIOVASCULAR: No chest pain, no palpitations  GASTROINTESTINAL: No pain. No nausea or vomiting; No diarrhea   NEUROLOGICAL: No headache or numbness, no tremors  MUSCULOSKELETAL: c/o pain in left thigh and knee  GENITOURINARY: no dysuria, no frequency, no hesitancy  PSYCHIATRY: no depression , no anxiety  ALL OTHER  ROS negative        Vital Signs Last 24 Hrs  T(C): 36.8 (20 Nov 2019 14:54), Max: 37.4 (20 Nov 2019 04:59)  T(F): 98.3 (20 Nov 2019 14:54), Max: 99.3 (20 Nov 2019 04:59)  HR: 92 (20 Nov 2019 14:54) (90 - 93)  BP: 145/74 (20 Nov 2019 14:54) (140/73 - 161/72)  BP(mean): --  RR: 18 (20 Nov 2019 14:54) (17 - 18)  SpO2: 96% (20 Nov 2019 14:54) (96% - 98%)    PHYSICAL EXAM:  GENERAL: alert obese man using nasal oxygen  HEENT: Normocephalic;  conjunctivae and sclerae clear; moist mucous membranes;   NECK : supple  CHEST/LUNG: Clear to auscultation bilaterally with good air entry   HEART: S1 S2  regular; no murmurs, gallops or rubs  ABDOMEN: Soft, Nontender, Nondistended; Bowel sounds present  EXTREMITIES: no cyanosis; less edema, tenderness to touch in all medical LE  SKIN: warm and dry; no rash  NERVOUS SYSTEM:  Awake and alert; Oriented  to place, person and time ; no new deficits    _________________________________________________  LABS:                                   8.7    9.23  )-----------( 330      ( 20 Nov 2019 07:12 )             30.3   :03)          11-20    135  |  98  |  48<H>  ----------------------------<  97  5.0   |  28  |  7.26<H>    Ca    10.1      20 Nov 2019 07:12

## 2019-11-20 NOTE — PROGRESS NOTE ADULT - ASSESSMENT
38 y/o male admitted to medicine for chronic LE pain, unable to be safely discharged as he did not have portable o2 with him.

## 2019-11-20 NOTE — PHYSICAL THERAPY INITIAL EVALUATION ADULT - PERTINENT HX OF CURRENT PROBLEM, REHAB EVAL
Patient brought to ED due to inc. pain and swelling to both LE. Patient w/ h/o ESRD on HD, Morbid obesity, Cocaine Abuse, COPD.

## 2019-11-20 NOTE — PROGRESS NOTE ADULT - SUBJECTIVE AND OBJECTIVE BOX
[facility] : Progress Note :: [phone], (office [phone]),   Dr La / Dr Hui / Dr Barber / Dr Villalobos / Dr Hang CASTELLANO / Dr Mehta / Dr Sanchez / Dr Alber dumont  _____________________________________________________________________________________________    continues to to have bilateral leg pains    aspirin (Short breath)  aspirin (Swelling)  fish (Unknown)  penicillin (Short breath)  penicillins (Swelling)  shellfish (Unknown)    Hospital Medications:   MEDICATIONS  (STANDING):  ascorbic acid 500 milliGRAM(s) Oral daily  carvedilol 6.25 milliGRAM(s) Oral every 12 hours  chlorhexidine 4% Liquid 1 Application(s) Topical <User Schedule>  clonazePAM  Tablet 0.5 milliGRAM(s) Oral every 12 hours  clopidogrel Tablet 75 milliGRAM(s) Oral daily  epoetin cyndie Injectable 4000 Unit(s) IV Push <User Schedule>  folic acid 1 milliGRAM(s) Oral daily  furosemide    Tablet 80 milliGRAM(s) Oral daily  gabapentin 300 milliGRAM(s) Oral at bedtime  heparin  Injectable 5000 Unit(s) SubCutaneous every 12 hours  hydrALAZINE 50 milliGRAM(s) Oral three times a day  insulin lispro (HumaLOG) corrective regimen sliding scale   SubCutaneous three times a day before meals  isosorbide   mononitrate ER Tablet (IMDUR) 30 milliGRAM(s) Oral daily  mirtazapine 7.5 milliGRAM(s) Oral at bedtime  pantoprazole    Tablet 40 milliGRAM(s) Oral before breakfast  PARoxetine 20 milliGRAM(s) Oral daily  risperiDONE   Tablet 1 milliGRAM(s) Oral every 12 hours  sevelamer carbonate 800 milliGRAM(s) Oral three times a day with meals  simvastatin 40 milliGRAM(s) Oral at bedtime      VITALS:  T(F): 98.3 (11-20-19 @ 14:54), Max: 99.3 (11-20-19 @ 04:59)  HR: 92 (11-20-19 @ 14:54)  BP: 145/74 (11-20-19 @ 14:54)  RR: 18 (11-20-19 @ 14:54)  SpO2: 96% (11-20-19 @ 14:54)  Wt(kg): --      PHYSICAL EXAM:  Constitutional: NAD  HEENT: anicteric sclera, oropharynx clear.  Neck: No JVD  Respiratory: CTAB, no wheezes, rales or rhonchi  Cardiovascular: S1, S2, RRR  Gastrointestinal: BS+, soft, NT/ND  Extremities: bilateral  peripheral edema++  Neurological: A/O x 3, no focal deficits  Vascular Access: AVF with thrill and bruit    LABS:  11-20    135  |  98  |  48<H>  ----------------------------<  97  5.0   |  28  |  7.26<H>    Ca    10.1      20 Nov 2019 07:12      Creatinine Trend: 7.26 <--, 7.91 <--, 9.14 <--, 10.20 <--                        8.7    9.23  )-----------( 330      ( 20 Nov 2019 07:12 )             30.3     Urine Studies:      RADIOLOGY & ADDITIONAL STUDIES:

## 2019-11-24 ENCOUNTER — EMERGENCY (EMERGENCY)
Facility: HOSPITAL | Age: 39
LOS: 1 days | Discharge: HOME | End: 2019-11-24
Attending: EMERGENCY MEDICINE
Payer: MEDICARE

## 2019-11-24 VITALS
HEART RATE: 100 BPM | HEIGHT: 78 IN | OXYGEN SATURATION: 96 % | TEMPERATURE: 98 F | SYSTOLIC BLOOD PRESSURE: 129 MMHG | RESPIRATION RATE: 18 BRPM | WEIGHT: 315 LBS | DIASTOLIC BLOOD PRESSURE: 84 MMHG

## 2019-11-24 VITALS
RESPIRATION RATE: 18 BRPM | SYSTOLIC BLOOD PRESSURE: 133 MMHG | TEMPERATURE: 98 F | DIASTOLIC BLOOD PRESSURE: 63 MMHG | OXYGEN SATURATION: 98 % | HEART RATE: 97 BPM

## 2019-11-24 DIAGNOSIS — Z98.890 OTHER SPECIFIED POSTPROCEDURAL STATES: Chronic | ICD-10-CM

## 2019-11-24 PROCEDURE — 73700 CT LOWER EXTREMITY W/O DYE: CPT

## 2019-11-24 PROCEDURE — 73562 X-RAY EXAM OF KNEE 3: CPT

## 2019-11-24 PROCEDURE — 99284 EMERGENCY DEPT VISIT MOD MDM: CPT | Mod: 25

## 2019-11-24 PROCEDURE — 73700 CT LOWER EXTREMITY W/O DYE: CPT | Mod: 26,LT

## 2019-11-24 PROCEDURE — 99284 EMERGENCY DEPT VISIT MOD MDM: CPT

## 2019-11-24 PROCEDURE — 73562 X-RAY EXAM OF KNEE 3: CPT | Mod: 26,LT

## 2019-11-24 RX ORDER — OXYCODONE AND ACETAMINOPHEN 5; 325 MG/1; MG/1
1 TABLET ORAL ONCE
Refills: 0 | Status: DISCONTINUED | OUTPATIENT
Start: 2019-11-24 | End: 2019-11-24

## 2019-11-24 RX ADMIN — OXYCODONE AND ACETAMINOPHEN 1 TABLET(S): 5; 325 TABLET ORAL at 15:35

## 2019-11-24 RX ADMIN — OXYCODONE AND ACETAMINOPHEN 1 TABLET(S): 5; 325 TABLET ORAL at 14:00

## 2019-11-24 RX ADMIN — OXYCODONE AND ACETAMINOPHEN 1 TABLET(S): 5; 325 TABLET ORAL at 15:05

## 2019-11-24 RX ADMIN — OXYCODONE AND ACETAMINOPHEN 1 TABLET(S): 5; 325 TABLET ORAL at 13:31

## 2019-11-24 RX ADMIN — OXYCODONE AND ACETAMINOPHEN 1 TABLET(S): 5; 325 TABLET ORAL at 17:49

## 2019-11-24 NOTE — ED PROVIDER NOTE - NEUROLOGICAL, MLM
Alert and oriented, no focal deficits, no motor or sensory deficits, neurovascularly intact distal to injury

## 2019-11-24 NOTE — ED PROVIDER NOTE - PROGRESS NOTE DETAILS
Patient will having pain. Will do CT to r/o occult fracture. Patient signed out to Dr. Quigley pending test results patient ct negative. patient endorses wanting to return home. pain controllled. placed in knee brace. instructed to f.u pmd

## 2019-11-24 NOTE — ED PROVIDER NOTE - PATIENT PORTAL LINK FT
You can access the FollowMyHealth Patient Portal offered by [facility] by registering at the following website: [email or web address]. By joining [employer]’s FollowMyHealth portal, you will also be able to view your health information using other applications (apps) compatible with our system.

## 2019-11-24 NOTE — ED ADULT NURSE NOTE - CHPI ED NUR SYMPTOMS NEG
no fever/no loss of consciousness/no tingling/no numbness/no abrasion/no vomiting/no confusion/no deformity/no bleeding

## 2019-11-24 NOTE — ED PROVIDER NOTE - OBJECTIVE STATEMENT
38 y/o M patient with significant PMHx of ESRD on dialysis (last full dialysis: today) and no significant PSHx presents to the ED with c/o severe left knee pain. Patient reports having slip and fall yesterday where he fell into a left knee. Patient states falling again on the elevator today. Patient denies any focal weakness, paresthesia, or any other complains.

## 2019-11-24 NOTE — ED ADULT NURSE NOTE - NSIMPLEMENTINTERV_GEN_ALL_ED
Implemented All Fall Risk Interventions:  [place] to call system. Call bell, personal items and telephone within reach. Instruct patient to call for assistance. Room bathroom lighting operational. Non-slip footwear when patient is off stretcher. Physically safe environment: no spills, clutter or unnecessary equipment. Stretcher in lowest position, wheels locked, appropriate side rails in place. Provide visual cue, wrist band, yellow gown, etc. Monitor gait and stability. Monitor for mental status changes and reorient to person, place, and time. Review medications for side effects contributing to fall risk. Reinforce activity limits and safety measures with patient and family.

## 2019-11-24 NOTE — ED ADULT NURSE NOTE - CHIEF COMPLAINT
Paged Dr. Abdullahi regarding low u/o over last two hours.    The patient is a 39y Male complaining of knee pain/injury.

## 2019-11-24 NOTE — ED ADULT NURSE NOTE - OBJECTIVE STATEMENT
pt had a slip & fall when coming out of the shower today, pt endorses he hit his L knee on the bathtub. Denies head trauma. pt has a LAV shunt and received dialysis today before arriving to the ED. breathing unlabored, denies any other complaints.

## 2019-11-30 ENCOUNTER — INPATIENT (INPATIENT)
Facility: HOSPITAL | Age: 39
LOS: 12 days | Discharge: NURSING, REHAB OR LONG-TERM CARE | DRG: 640 | End: 2019-12-13
Attending: INTERNAL MEDICINE | Admitting: INTERNAL MEDICINE
Payer: MEDICARE

## 2019-11-30 VITALS
RESPIRATION RATE: 24 BRPM | HEART RATE: 90 BPM | SYSTOLIC BLOOD PRESSURE: 153 MMHG | WEIGHT: 315 LBS | OXYGEN SATURATION: 98 % | DIASTOLIC BLOOD PRESSURE: 90 MMHG

## 2019-11-30 DIAGNOSIS — J81.1 CHRONIC PULMONARY EDEMA: ICD-10-CM

## 2019-11-30 DIAGNOSIS — Z98.890 OTHER SPECIFIED POSTPROCEDURAL STATES: Chronic | ICD-10-CM

## 2019-11-30 LAB
ALBUMIN SERPL ELPH-MCNC: 2.6 G/DL — LOW (ref 3.5–5)
ALP SERPL-CCNC: 298 U/L — HIGH (ref 40–120)
ALT FLD-CCNC: 17 U/L DA — SIGNIFICANT CHANGE UP (ref 10–60)
ANION GAP SERPL CALC-SCNC: 11 MMOL/L — SIGNIFICANT CHANGE UP (ref 5–17)
APTT BLD: 35.1 SEC — SIGNIFICANT CHANGE UP (ref 27.5–36.3)
AST SERPL-CCNC: 20 U/L — SIGNIFICANT CHANGE UP (ref 10–40)
BASE EXCESS BLDV CALC-SCNC: 3.3 MMOL/L — HIGH (ref -2–2)
BASOPHILS # BLD AUTO: 0.06 K/UL — SIGNIFICANT CHANGE UP (ref 0–0.2)
BASOPHILS NFR BLD AUTO: 0.6 % — SIGNIFICANT CHANGE UP (ref 0–2)
BILIRUB SERPL-MCNC: 0.7 MG/DL — SIGNIFICANT CHANGE UP (ref 0.2–1.2)
BLOOD GAS COMMENTS, VENOUS: SIGNIFICANT CHANGE UP
BUN SERPL-MCNC: 70 MG/DL — HIGH (ref 7–18)
CALCIUM SERPL-MCNC: 8.6 MG/DL — SIGNIFICANT CHANGE UP (ref 8.4–10.5)
CHLORIDE SERPL-SCNC: 93 MMOL/L — LOW (ref 96–108)
CK MB BLD-MCNC: <2.9 % — SIGNIFICANT CHANGE UP (ref 0–3.5)
CK MB CFR SERPL CALC: <1 NG/ML — SIGNIFICANT CHANGE UP (ref 0–3.6)
CK SERPL-CCNC: 35 U/L — SIGNIFICANT CHANGE UP (ref 35–232)
CO2 SERPL-SCNC: 28 MMOL/L — SIGNIFICANT CHANGE UP (ref 22–31)
CREAT SERPL-MCNC: 10.7 MG/DL — HIGH (ref 0.5–1.3)
EOSINOPHIL # BLD AUTO: 0.58 K/UL — HIGH (ref 0–0.5)
EOSINOPHIL NFR BLD AUTO: 5.7 % — SIGNIFICANT CHANGE UP (ref 0–6)
GLUCOSE SERPL-MCNC: 142 MG/DL — HIGH (ref 70–99)
HCO3 BLDV-SCNC: 31 MMOL/L — HIGH (ref 21–29)
HCT VFR BLD CALC: 29.7 % — LOW (ref 39–50)
HGB BLD-MCNC: 8.7 G/DL — LOW (ref 13–17)
HOROWITZ INDEX BLDV+IHG-RTO: 100 — SIGNIFICANT CHANGE UP
IMM GRANULOCYTES NFR BLD AUTO: 0.4 % — SIGNIFICANT CHANGE UP (ref 0–1.5)
INR BLD: 1.18 RATIO — HIGH (ref 0.88–1.16)
LIDOCAIN IGE QN: 98 U/L — SIGNIFICANT CHANGE UP (ref 73–393)
LYMPHOCYTES # BLD AUTO: 0.72 K/UL — LOW (ref 1–3.3)
LYMPHOCYTES # BLD AUTO: 7.1 % — LOW (ref 13–44)
MAGNESIUM SERPL-MCNC: 2.3 MG/DL — SIGNIFICANT CHANGE UP (ref 1.6–2.6)
MCHC RBC-ENTMCNC: 27.2 PG — SIGNIFICANT CHANGE UP (ref 27–34)
MCHC RBC-ENTMCNC: 29.3 GM/DL — LOW (ref 32–36)
MCV RBC AUTO: 92.8 FL — SIGNIFICANT CHANGE UP (ref 80–100)
MONOCYTES # BLD AUTO: 1.29 K/UL — HIGH (ref 0–0.9)
MONOCYTES NFR BLD AUTO: 12.7 % — SIGNIFICANT CHANGE UP (ref 2–14)
NEUTROPHILS # BLD AUTO: 7.44 K/UL — HIGH (ref 1.8–7.4)
NEUTROPHILS NFR BLD AUTO: 73.5 % — SIGNIFICANT CHANGE UP (ref 43–77)
NRBC # BLD: 0 /100 WBCS — SIGNIFICANT CHANGE UP (ref 0–0)
NT-PROBNP SERPL-SCNC: HIGH PG/ML (ref 0–125)
PCO2 BLDV: 71 MMHG — HIGH (ref 35–50)
PH BLDV: 7.26 — LOW (ref 7.35–7.45)
PLATELET # BLD AUTO: 322 K/UL — SIGNIFICANT CHANGE UP (ref 150–400)
PO2 BLDV: 36 MMHG — SIGNIFICANT CHANGE UP (ref 25–45)
POTASSIUM SERPL-MCNC: 5 MMOL/L — SIGNIFICANT CHANGE UP (ref 3.5–5.3)
POTASSIUM SERPL-SCNC: 5 MMOL/L — SIGNIFICANT CHANGE UP (ref 3.5–5.3)
PROT SERPL-MCNC: 7.7 G/DL — SIGNIFICANT CHANGE UP (ref 6–8.3)
PROTHROM AB SERPL-ACNC: 13.2 SEC — HIGH (ref 10–12.9)
RBC # BLD: 3.2 M/UL — LOW (ref 4.2–5.8)
RBC # FLD: 17.8 % — HIGH (ref 10.3–14.5)
SAO2 % BLDV: 56 % — LOW (ref 67–88)
SODIUM SERPL-SCNC: 132 MMOL/L — LOW (ref 135–145)
TROPONIN I SERPL-MCNC: <0.015 NG/ML — SIGNIFICANT CHANGE UP (ref 0–0.04)
WBC # BLD: 10.13 K/UL — SIGNIFICANT CHANGE UP (ref 3.8–10.5)
WBC # FLD AUTO: 10.13 K/UL — SIGNIFICANT CHANGE UP (ref 3.8–10.5)

## 2019-11-30 PROCEDURE — 71045 X-RAY EXAM CHEST 1 VIEW: CPT | Mod: 26

## 2019-11-30 PROCEDURE — 99285 EMERGENCY DEPT VISIT HI MDM: CPT

## 2019-11-30 PROCEDURE — 99291 CRITICAL CARE FIRST HOUR: CPT

## 2019-11-30 RX ORDER — FUROSEMIDE 40 MG
80 TABLET ORAL ONCE
Refills: 0 | Status: COMPLETED | OUTPATIENT
Start: 2019-11-30 | End: 2019-11-30

## 2019-11-30 RX ORDER — FUROSEMIDE 40 MG
40 TABLET ORAL ONCE
Refills: 0 | Status: DISCONTINUED | OUTPATIENT
Start: 2019-11-30 | End: 2019-11-30

## 2019-11-30 RX ORDER — FUROSEMIDE 40 MG
40 TABLET ORAL ONCE
Refills: 0 | Status: COMPLETED | OUTPATIENT
Start: 2019-11-30 | End: 2019-11-30

## 2019-11-30 RX ORDER — ERYTHROPOIETIN 10000 [IU]/ML
10000 INJECTION, SOLUTION INTRAVENOUS; SUBCUTANEOUS
Refills: 0 | Status: COMPLETED | OUTPATIENT
Start: 2019-11-30 | End: 2019-12-04

## 2019-11-30 RX ORDER — IPRATROPIUM/ALBUTEROL SULFATE 18-103MCG
3 AEROSOL WITH ADAPTER (GRAM) INHALATION ONCE
Refills: 0 | Status: COMPLETED | OUTPATIENT
Start: 2019-11-30 | End: 2019-11-30

## 2019-11-30 RX ORDER — ACETAMINOPHEN 500 MG
1000 TABLET ORAL ONCE
Refills: 0 | Status: COMPLETED | OUTPATIENT
Start: 2019-11-30 | End: 2019-11-30

## 2019-11-30 RX ORDER — FUROSEMIDE 40 MG
120 TABLET ORAL ONCE
Refills: 0 | Status: COMPLETED | OUTPATIENT
Start: 2019-11-30 | End: 2019-11-30

## 2019-11-30 RX ADMIN — Medication 80 MILLIGRAM(S): at 21:20

## 2019-11-30 RX ADMIN — Medication 400 MILLIGRAM(S): at 21:31

## 2019-11-30 RX ADMIN — ERYTHROPOIETIN 10000 UNIT(S): 10000 INJECTION, SOLUTION INTRAVENOUS; SUBCUTANEOUS at 23:03

## 2019-11-30 RX ADMIN — Medication 125 MILLIGRAM(S): at 21:06

## 2019-11-30 RX ADMIN — Medication 3 MILLILITER(S): at 20:36

## 2019-11-30 RX ADMIN — Medication 1000 MILLIGRAM(S): at 22:19

## 2019-11-30 RX ADMIN — Medication 40 MILLIGRAM(S): at 21:39

## 2019-11-30 NOTE — H&P ADULT - NSHPPHYSICALEXAM_GEN_ALL_CORE
HR: 90 (30 Nov 2019 20:32) (90 - 90)  BP: 153/90 (30 Nov 2019 20:32) (153/90 - 153/90)  RR: 24 (30 Nov 2019 20:32) (24 - 24)  SpO2: 98% (30 Nov 2019 20:32) (98% - 98%)

## 2019-11-30 NOTE — ED ADULT NURSE NOTE - NSIMPLEMENTINTERV_GEN_ALL_ED
Implemented All Fall Risk Interventions:  [place] to call system. Call bell, personal items and telephone within reach. Instruct patient to call for assistance. Room bathroom lighting operational. Non-slip footwear when patient is off stretcher. Physically safe environment: no spills, clutter or unnecessary equipment. Stretcher in lowest position, wheels locked, appropriate side rails in place. Provide visual cue, wrist band, yellow gown, etc. Monitor gait and stability. Monitor for mental status changes and reorient to person, place, and time. Review medications for side effects contributing to fall risk. Reinforce activity limits and safety measures with patient and family.

## 2019-11-30 NOTE — PATIENT PROFILE ADULT - NSPROHMDIABETMGMTSTRAT_GEN_A_NUR
Nutrition Services/Malnutrition Alert diet modification/weight management/routine screenings/adequate rest

## 2019-11-30 NOTE — H&P ADULT - ASSESSMENT
38 y/o morbidly obese male from home PMH ESRD (on MWF HD via LUE AVF at [facility] w/ Dr La), HTN, IDDM, Substance abuse, COPD (former smoker, on home oxygen 2L), Bipolar disorder, and Schizophrenia p/w shortness of breath x 1 day s/p missed HD - HPI limited given patient's moderate work of breathing. Patient states he was suppose to go for HD Friday (schedule was changed 2/2 Thanksgiving week but he was never picked up) and as such developed dyspnea, cough, and work of breathing. In the ED patient seen in acute distress initially placed on standard face mask given but patient refused 2/2 claustrophobia. As per the ED provider patient was not hypoxia. Patient has chronic complaints of pain of his lower extremities - he is actively seeking for a pain management specialist. Patient seen and examined at bedside severely anxious with increased respiratory rate. 40 y/o morbidly obese male from home PMH ESRD (on MWF HD via LUE AVF at [facility] w/ Dr La), HTN, IDDM, Substance abuse, COPD (former smoker, on home oxygen 2L), Bipolar disorder, and Schizophrenia p/w shortness of breath x 1 day s/p missed HD - admitted to the ICU for acute respiratory failure 2/2 acute pulmonary edema 2/2 missed HD session requiring nasal BIPAP (given clasutrophobia)  ***Primary team to reconcile outpatient medications unclear compliance    Neuro  No acute issues    Cardio  CHFpEF  - ProBNP unchanged, possible diastolic CHF exacerbation 2/2 volume overload, s/op 120 mg Lasix w/ 300 UO  - EKG no acute changes, Troponin #1 negative, trend Dirk  HTN  - Hemodynamically stable, resumed Coreg; holding Hydralazine/Imdur, restart PRN after HD    Resp  Acute respiratory failure 2/2 acute pulmonary edema 2/2 missed HD   - Patient currently refusing nasal BIPAP; C/w NC  COPD  - C/w supplemental oxygen, Duoneb, and Solumedrol given coarse wheezing and rales  ?Hx of undiagnosed KRYSTIN given VBG showing chronic elevations CO2    GI  NPO 2/2 BIPAP  - Advance diet as tolerated    Renal  ESRD  - S/p HD 12/1 for APE  - HD as per Nephrology Dr La and monitor BMP    Endo  IDDM prior A1c 5.3  - Goal  -180; outpatient regimen 15 U premeal but prior admission managed w/ ISS alone  - C/w ISS and FSQ6    Heme  - No acute issues    ID   - No acute issues    Psych  Mood disorder, Schizophrenia, and Chronic Pain  - On multiple psychoactive medications at home; C/w Clonazepam, Risperidone, and Mirtazapine    HSQ for DVT PPx

## 2019-11-30 NOTE — H&P ADULT - HISTORY OF PRESENT ILLNESS
40 y/o morbidly obese male from home PMH ESRD (on MWF HD via LUE AVF at [facility] w/ Dr La), HTN, IDDM, Substance abuse, COPD (former smoker, on home oxygen 2L), Bipolar disorder, and Schizophrenia p/w shortness of breath x 1 day s/p missed HD - HPI limited given patient's moderate work of breathing. Patient states he was suppose to go for HD Friday (schedule was changed 2/2 Thanksgiving week but he was never picked up) and as such developed dyspnea, cough, and work of breathing. In the ED patient seen in acute distress initially placed on standard face mask given but patient refused 2/2 claustrophobia. As per the ED provider patient was not hypoxia. Patient has chronic complaints of pain of his lower extremities - he is actively seeking for a pain management specialist. Patient seen and examined at bedside severely anxious with increased respiratory rate.

## 2019-11-30 NOTE — H&P ADULT - NEUROLOGICAL DETAILS
strength decreased/responds to pain/alert and oriented x 3/responds to verbal commands/sensation intact

## 2019-11-30 NOTE — ED PROVIDER NOTE - CARE PLAN
Principal Discharge DX:	Pulmonary edema  Secondary Diagnosis:	Hemodialysis patient  Secondary Diagnosis:	Respiratory distress

## 2019-11-30 NOTE — ED PROVIDER NOTE - OBJECTIVE STATEMENT
Pertinent HPI/PMH/PSH/FHx/SHx and Review of Systems contained within  HPI: 40 yo M p/w CC shortness of breath x today as a notification. pt with temperature of 99F at home. Pt with ESRD, last dialyzed on Tuesday, pt regularly scheduled for dialysis M/W/F.  PMH/PSH relevant for: ESRD (on MWF HD via LUE AVF), DM (on insulin), cocaine abuse, COPD on home O2 (2L NC), morbid obesity, Bipolar disorder, schizophrenia and HTN, former smoker with 30 pack history  ROS negative for: fever, Chest pain, Nausea, vomiting, diarrhea, abdominal pain, dysuria    FamilyHx and SocialHx not otherwise contributory

## 2019-11-30 NOTE — ED ADULT NURSE NOTE - ED STAT RN HANDOFF DETAILS 2
brought in by ems from home as notification for respiratory   distress.placed on BIPAP  12/6 f102 60%  bld.drawn and sent to lab. medication given as ordered. admitted to ICU  for  PE. missed dialysis last friday. last dialysis  was tuesday. transfer to ICU  report given to rn ICU DANIEL MARCOS.

## 2019-11-30 NOTE — ED ADULT NURSE NOTE - OBJECTIVE STATEMENT
brought in by p/w CC shortness of breath x today as a notification. pt with temperature of 99F at home. Pt with ESRD, last dialyzed on Tuesday, pt regularly brought in by EMS  from home as a notification with  respiratory distress.hx.  ESRD, last dialyzed on Tuesday . bld.drawn and sent to lab.pain scale 8/10 . tylenol IVPB  given  as  ordred.placed on bipap  12/6/ f102 60%

## 2019-11-30 NOTE — H&P ADULT - ATTENDING COMMENTS
Patient was seen and examined with resident in the ED upon consultation request at 8.50PM.   This is 39 year old morbidly obese man with PMH as listed above including ESRD (On HD) presented to the ED with worsening SOB after he missed HD yesterday. In the ED he was noted to have acute pulmonary edema and given IV Lasix 80mg x1, placed on bipap and renal consulted for urgent HD. Patient accepted to ICU for HD.  VS stable, young male morbidly obese comfortable on bi-level support though occasionally tachypneic because of anxiety/pain, Lungs- bilateral air entry with bibasilar rales, abdomen- obese, soft, no tenderness, Ext: bilateral pitting pedal edema+++  Labs/Imaging reviewed: K 5.0, Hb 8.7, VBG- pH 7.26/71, CXR showed pulmonary edema  A/P  Acute Pulmonary edema from fluid overload from missed HD. Acute on chronic hypercapnic respiratory failure- on nasal bipap.  - For urgent HD, renal attending informed and agrees to HD today. Repeat BMP post-HD.  -Continue bi-level support, obtain ABG. NPO for now  -Trend troponins, 12 lead EKG, ECHO  -Pain management,  -DVT prophylaxis with lovenox.

## 2019-11-30 NOTE — H&P ADULT - NSHPSOCIALHISTORY_GEN_ALL_CORE
Patient quit smoking 6 months back and he smoked 2 PPD for 15 years, he was taking cocaine since 1 year and last intake was reported last month. Otherwise denies drinking alcohol.

## 2019-11-30 NOTE — ED PROVIDER NOTE - NS ED ROS FT
Review of Systems:  	•	CONSTITUTIONAL: no fever  	•	SKIN: no rash  	•	RESPIRATORY: shortness of breath  	•	CARDIAC: no chest pain, no palpitations  	•	GI:  no abd pain, no nausea, no vomiting, no diarrhea  	•	GENITO-URINARY:  no dysuria; no hematuria    	•	MUSCULOSKELETAL:  no back pain  	•	NEUROLOGIC: no weakness  	•	ALLERGY: no rhinitis  	•	PSYSCHIATRIC: no anxiety

## 2019-11-30 NOTE — ED PROVIDER NOTE - CLINICAL SUMMARY MEDICAL DECISION MAKING FREE TEXT BOX
Pt with shortness of breath. Pulmonary edema with component of COPD exacerbation due to missing dialysis. Called nephrology for urgent dialysis. Will start on BiPaP and consult ICU.

## 2019-12-01 LAB
ANION GAP SERPL CALC-SCNC: 8 MMOL/L — SIGNIFICANT CHANGE UP (ref 5–17)
BASOPHILS # BLD AUTO: 0 K/UL — SIGNIFICANT CHANGE UP (ref 0–0.2)
BASOPHILS NFR BLD AUTO: 0 % — SIGNIFICANT CHANGE UP (ref 0–2)
BUN SERPL-MCNC: 51 MG/DL — HIGH (ref 7–18)
CALCIUM SERPL-MCNC: 9.4 MG/DL — SIGNIFICANT CHANGE UP (ref 8.4–10.5)
CHLORIDE SERPL-SCNC: 94 MMOL/L — LOW (ref 96–108)
CK MB BLD-MCNC: <3.8 % — HIGH (ref 0–3.5)
CK MB CFR SERPL CALC: <1 NG/ML — SIGNIFICANT CHANGE UP (ref 0–3.6)
CK SERPL-CCNC: 26 U/L — LOW (ref 35–232)
CO2 SERPL-SCNC: 27 MMOL/L — SIGNIFICANT CHANGE UP (ref 22–31)
CREAT SERPL-MCNC: 8.14 MG/DL — HIGH (ref 0.5–1.3)
EOSINOPHIL # BLD AUTO: 0 K/UL — SIGNIFICANT CHANGE UP (ref 0–0.5)
EOSINOPHIL NFR BLD AUTO: 0 % — SIGNIFICANT CHANGE UP (ref 0–6)
GLUCOSE SERPL-MCNC: 137 MG/DL — HIGH (ref 70–99)
HCT VFR BLD CALC: 36 % — LOW (ref 39–50)
HGB BLD-MCNC: 10.3 G/DL — LOW (ref 13–17)
LYMPHOCYTES # BLD AUTO: 0.39 K/UL — LOW (ref 1–3.3)
LYMPHOCYTES # BLD AUTO: 5 % — LOW (ref 13–44)
MAGNESIUM SERPL-MCNC: 2.4 MG/DL — SIGNIFICANT CHANGE UP (ref 1.6–2.6)
MCHC RBC-ENTMCNC: 27.4 PG — SIGNIFICANT CHANGE UP (ref 27–34)
MCHC RBC-ENTMCNC: 28.6 GM/DL — LOW (ref 32–36)
MCV RBC AUTO: 95.7 FL — SIGNIFICANT CHANGE UP (ref 80–100)
MONOCYTES # BLD AUTO: 0 K/UL — SIGNIFICANT CHANGE UP (ref 0–0.9)
MONOCYTES NFR BLD AUTO: 0 % — LOW (ref 2–14)
NEUTROPHILS # BLD AUTO: 7.37 K/UL — SIGNIFICANT CHANGE UP (ref 1.8–7.4)
NEUTROPHILS NFR BLD AUTO: 95 % — HIGH (ref 43–77)
PCP SPEC-MCNC: SIGNIFICANT CHANGE UP
PHOSPHATE SERPL-MCNC: 6.6 MG/DL — HIGH (ref 2.5–4.5)
PLATELET # BLD AUTO: 307 K/UL — SIGNIFICANT CHANGE UP (ref 150–400)
POTASSIUM SERPL-MCNC: 5.8 MMOL/L — HIGH (ref 3.5–5.3)
POTASSIUM SERPL-SCNC: 5.8 MMOL/L — HIGH (ref 3.5–5.3)
RBC # BLD: 3.76 M/UL — LOW (ref 4.2–5.8)
RBC # FLD: 17.9 % — HIGH (ref 10.3–14.5)
SODIUM SERPL-SCNC: 129 MMOL/L — LOW (ref 135–145)
TROPONIN I SERPL-MCNC: <0.015 NG/ML — SIGNIFICANT CHANGE UP (ref 0–0.04)
WBC # BLD: 7.76 K/UL — SIGNIFICANT CHANGE UP (ref 3.8–10.5)
WBC # FLD AUTO: 7.76 K/UL — SIGNIFICANT CHANGE UP (ref 3.8–10.5)

## 2019-12-01 PROCEDURE — 71045 X-RAY EXAM CHEST 1 VIEW: CPT | Mod: 26

## 2019-12-01 PROCEDURE — 99232 SBSQ HOSP IP/OBS MODERATE 35: CPT | Mod: GC

## 2019-12-01 RX ORDER — DEXTROSE 50 % IN WATER 50 %
50 SYRINGE (ML) INTRAVENOUS ONCE
Refills: 0 | Status: COMPLETED | OUTPATIENT
Start: 2019-12-01 | End: 2019-12-01

## 2019-12-01 RX ORDER — MIRTAZAPINE 45 MG/1
7.5 TABLET, ORALLY DISINTEGRATING ORAL DAILY
Refills: 0 | Status: DISCONTINUED | OUTPATIENT
Start: 2019-12-01 | End: 2019-12-13

## 2019-12-01 RX ORDER — CHLORHEXIDINE GLUCONATE 213 G/1000ML
1 SOLUTION TOPICAL DAILY
Refills: 0 | Status: DISCONTINUED | OUTPATIENT
Start: 2019-12-01 | End: 2019-12-13

## 2019-12-01 RX ORDER — PANTOPRAZOLE SODIUM 20 MG/1
40 TABLET, DELAYED RELEASE ORAL
Refills: 0 | Status: DISCONTINUED | OUTPATIENT
Start: 2019-12-01 | End: 2019-12-03

## 2019-12-01 RX ORDER — OXYCODONE AND ACETAMINOPHEN 5; 325 MG/1; MG/1
1 TABLET ORAL EVERY 6 HOURS
Refills: 0 | Status: DISCONTINUED | OUTPATIENT
Start: 2019-12-01 | End: 2019-12-08

## 2019-12-01 RX ORDER — RISPERIDONE 4 MG/1
1 TABLET ORAL EVERY 12 HOURS
Refills: 0 | Status: DISCONTINUED | OUTPATIENT
Start: 2019-12-01 | End: 2019-12-13

## 2019-12-01 RX ORDER — METHOCARBAMOL 500 MG/1
500 TABLET, FILM COATED ORAL
Refills: 0 | Status: DISCONTINUED | OUTPATIENT
Start: 2019-12-01 | End: 2019-12-13

## 2019-12-01 RX ORDER — IPRATROPIUM/ALBUTEROL SULFATE 18-103MCG
3 AEROSOL WITH ADAPTER (GRAM) INHALATION EVERY 6 HOURS
Refills: 0 | Status: DISCONTINUED | OUTPATIENT
Start: 2019-12-01 | End: 2019-12-13

## 2019-12-01 RX ORDER — SEVELAMER CARBONATE 2400 MG/1
800 POWDER, FOR SUSPENSION ORAL
Refills: 0 | Status: DISCONTINUED | OUTPATIENT
Start: 2019-12-01 | End: 2019-12-02

## 2019-12-01 RX ORDER — CLONAZEPAM 1 MG
0.5 TABLET ORAL EVERY 12 HOURS
Refills: 0 | Status: DISCONTINUED | OUTPATIENT
Start: 2019-12-01 | End: 2019-12-04

## 2019-12-01 RX ORDER — SODIUM ZIRCONIUM CYCLOSILICATE 10 G/10G
10 POWDER, FOR SUSPENSION ORAL ONCE
Refills: 0 | Status: COMPLETED | OUTPATIENT
Start: 2019-12-01 | End: 2019-12-01

## 2019-12-01 RX ORDER — HEPARIN SODIUM 5000 [USP'U]/ML
5000 INJECTION INTRAVENOUS; SUBCUTANEOUS EVERY 8 HOURS
Refills: 0 | Status: DISCONTINUED | OUTPATIENT
Start: 2019-12-01 | End: 2019-12-13

## 2019-12-01 RX ORDER — SIMVASTATIN 20 MG/1
40 TABLET, FILM COATED ORAL AT BEDTIME
Refills: 0 | Status: DISCONTINUED | OUTPATIENT
Start: 2019-12-01 | End: 2019-12-13

## 2019-12-01 RX ORDER — CALCIUM GLUCONATE 100 MG/ML
1 VIAL (ML) INTRAVENOUS ONCE
Refills: 0 | Status: COMPLETED | OUTPATIENT
Start: 2019-12-01 | End: 2019-12-01

## 2019-12-01 RX ORDER — TRAMADOL HYDROCHLORIDE 50 MG/1
25 TABLET ORAL ONCE
Refills: 0 | Status: DISCONTINUED | OUTPATIENT
Start: 2019-12-01 | End: 2019-12-01

## 2019-12-01 RX ORDER — CARVEDILOL PHOSPHATE 80 MG/1
6.25 CAPSULE, EXTENDED RELEASE ORAL EVERY 12 HOURS
Refills: 0 | Status: DISCONTINUED | OUTPATIENT
Start: 2019-12-01 | End: 2019-12-13

## 2019-12-01 RX ORDER — CLOPIDOGREL BISULFATE 75 MG/1
75 TABLET, FILM COATED ORAL DAILY
Refills: 0 | Status: DISCONTINUED | OUTPATIENT
Start: 2019-12-01 | End: 2019-12-13

## 2019-12-01 RX ORDER — INSULIN HUMAN 100 [IU]/ML
5 INJECTION, SOLUTION SUBCUTANEOUS ONCE
Refills: 0 | Status: COMPLETED | OUTPATIENT
Start: 2019-12-01 | End: 2019-12-01

## 2019-12-01 RX ORDER — INSULIN LISPRO 100/ML
VIAL (ML) SUBCUTANEOUS EVERY 6 HOURS
Refills: 0 | Status: DISCONTINUED | OUTPATIENT
Start: 2019-12-01 | End: 2019-12-04

## 2019-12-01 RX ORDER — OXYCODONE AND ACETAMINOPHEN 5; 325 MG/1; MG/1
1 TABLET ORAL ONCE
Refills: 0 | Status: DISCONTINUED | OUTPATIENT
Start: 2019-12-01 | End: 2019-12-01

## 2019-12-01 RX ADMIN — INSULIN HUMAN 5 UNIT(S): 100 INJECTION, SOLUTION SUBCUTANEOUS at 09:58

## 2019-12-01 RX ADMIN — OXYCODONE AND ACETAMINOPHEN 1 TABLET(S): 5; 325 TABLET ORAL at 05:50

## 2019-12-01 RX ADMIN — Medication 3 MILLILITER(S): at 08:51

## 2019-12-01 RX ADMIN — OXYCODONE AND ACETAMINOPHEN 1 TABLET(S): 5; 325 TABLET ORAL at 17:11

## 2019-12-01 RX ADMIN — SEVELAMER CARBONATE 800 MILLIGRAM(S): 2400 POWDER, FOR SUSPENSION ORAL at 11:40

## 2019-12-01 RX ADMIN — Medication 50 MILLILITER(S): at 09:15

## 2019-12-01 RX ADMIN — Medication 1: at 23:44

## 2019-12-01 RX ADMIN — METHOCARBAMOL 500 MILLIGRAM(S): 500 TABLET, FILM COATED ORAL at 13:26

## 2019-12-01 RX ADMIN — RISPERIDONE 1 MILLIGRAM(S): 4 TABLET ORAL at 17:39

## 2019-12-01 RX ADMIN — Medication 2: at 11:40

## 2019-12-01 RX ADMIN — CARVEDILOL PHOSPHATE 6.25 MILLIGRAM(S): 80 CAPSULE, EXTENDED RELEASE ORAL at 05:51

## 2019-12-01 RX ADMIN — Medication 3 MILLILITER(S): at 20:06

## 2019-12-01 RX ADMIN — SEVELAMER CARBONATE 800 MILLIGRAM(S): 2400 POWDER, FOR SUSPENSION ORAL at 09:15

## 2019-12-01 RX ADMIN — MIRTAZAPINE 7.5 MILLIGRAM(S): 45 TABLET, ORALLY DISINTEGRATING ORAL at 11:40

## 2019-12-01 RX ADMIN — Medication 40 MILLIGRAM(S): at 22:19

## 2019-12-01 RX ADMIN — HEPARIN SODIUM 5000 UNIT(S): 5000 INJECTION INTRAVENOUS; SUBCUTANEOUS at 22:19

## 2019-12-01 RX ADMIN — Medication 40 MILLIGRAM(S): at 13:26

## 2019-12-01 RX ADMIN — SIMVASTATIN 40 MILLIGRAM(S): 20 TABLET, FILM COATED ORAL at 22:19

## 2019-12-01 RX ADMIN — RISPERIDONE 1 MILLIGRAM(S): 4 TABLET ORAL at 06:00

## 2019-12-01 RX ADMIN — Medication 40 MILLIGRAM(S): at 05:51

## 2019-12-01 RX ADMIN — METHOCARBAMOL 500 MILLIGRAM(S): 500 TABLET, FILM COATED ORAL at 17:39

## 2019-12-01 RX ADMIN — Medication 1: at 17:38

## 2019-12-01 RX ADMIN — OXYCODONE AND ACETAMINOPHEN 1 TABLET(S): 5; 325 TABLET ORAL at 10:24

## 2019-12-01 RX ADMIN — CHLORHEXIDINE GLUCONATE 1 APPLICATION(S): 213 SOLUTION TOPICAL at 12:19

## 2019-12-01 RX ADMIN — HEPARIN SODIUM 5000 UNIT(S): 5000 INJECTION INTRAVENOUS; SUBCUTANEOUS at 13:26

## 2019-12-01 RX ADMIN — OXYCODONE AND ACETAMINOPHEN 1 TABLET(S): 5; 325 TABLET ORAL at 07:06

## 2019-12-01 RX ADMIN — CARVEDILOL PHOSPHATE 6.25 MILLIGRAM(S): 80 CAPSULE, EXTENDED RELEASE ORAL at 17:39

## 2019-12-01 RX ADMIN — OXYCODONE AND ACETAMINOPHEN 1 TABLET(S): 5; 325 TABLET ORAL at 22:19

## 2019-12-01 RX ADMIN — SEVELAMER CARBONATE 800 MILLIGRAM(S): 2400 POWDER, FOR SUSPENSION ORAL at 17:39

## 2019-12-01 RX ADMIN — OXYCODONE AND ACETAMINOPHEN 1 TABLET(S): 5; 325 TABLET ORAL at 16:35

## 2019-12-01 RX ADMIN — Medication 3 MILLILITER(S): at 15:45

## 2019-12-01 RX ADMIN — HEPARIN SODIUM 5000 UNIT(S): 5000 INJECTION INTRAVENOUS; SUBCUTANEOUS at 05:51

## 2019-12-01 RX ADMIN — Medication 200 GRAM(S): at 09:15

## 2019-12-01 RX ADMIN — SODIUM ZIRCONIUM CYCLOSILICATE 10 GRAM(S): 10 POWDER, FOR SUSPENSION ORAL at 16:33

## 2019-12-01 RX ADMIN — CLOPIDOGREL BISULFATE 75 MILLIGRAM(S): 75 TABLET, FILM COATED ORAL at 11:40

## 2019-12-01 RX ADMIN — OXYCODONE AND ACETAMINOPHEN 1 TABLET(S): 5; 325 TABLET ORAL at 23:39

## 2019-12-01 RX ADMIN — PANTOPRAZOLE SODIUM 40 MILLIGRAM(S): 20 TABLET, DELAYED RELEASE ORAL at 05:52

## 2019-12-01 RX ADMIN — OXYCODONE AND ACETAMINOPHEN 1 TABLET(S): 5; 325 TABLET ORAL at 11:03

## 2019-12-01 NOTE — CHART NOTE - NSCHARTNOTEFT_GEN_A_CORE
40 y/o morbidly obese male from home PMH ESRD (on MWF HD via LUE AVF at [facility] w/ Dr La), HTN, IDDM, Substance abuse, KRYSTIN ( on home oxygen 2L), Bipolar disorder, and Schizophrenia p/w shortness of breath x 1 day s/p missed HD - admitted to the ICU for acute respiratory failure 2/2 acute pulmonary edema 2/2 missed HD session requiring nasal BIPAP (given clasutrophobia). He received emergent HD  after admission.    Neuro  No acute issues    Cardio  CHFpEF  - ProBNP unchanged, possible diastolic CHF exacerbation 2/2 volume overload, s/op 120 mg Lasix w/ 300 UO  - EKG no acute changes, Troponin #1and 2 negative  HTN  - Hemodynamically stable, resumed Coreg; holding Hydralazine/Imdur, restart PRN after HD    Resp  Acute respiratory failure 2/2 acute pulmonary edema 2/2 missed HD   - Patient currently refusing nasal BIPAP; C/w NC  KRYSTIN  - C/w supplemental oxygen, Duoneb, and Solumedrol given coarse wheezing and rales  ?Hx of undiagnosed KRYSTIN given VBG showing chronic elevations CO2    GI  - no issues    Renal  ESRD  - S/p HD 12/1   - HD as per Nephrology Dr La and monitor BMP    Endo  IDDM prior A1c 5.3  - Goal  -180; outpatient regimen 15 U premeal but prior admission managed w/ ISS alone  - C/w ISS and FSQ6    Heme  - No acute issues    ID   - No acute issues    Psych  Mood disorder, Schizophrenia, and Chronic Pain  - On multiple psychoactive medications at home; C/w Clonazepam, Risperidone, and Mirtazapine    HSQ for DVT PPx    Things to follow:  - Monitor BP and restart home meds as needed    Discussed with PGY-1 covering Floor and Attending DrHernesto _________. Patient will be transferred to floor. Discussed with ICU attending. Patient clinically stable for downgrade

## 2019-12-01 NOTE — PROGRESS NOTE ADULT - ASSESSMENT
38 y/o morbidly obese male from home PMH ESRD (on MWF HD via LUE AVF at [facility] w/ Dr La), HTN, IDDM, Substance abuse, COPD (former smoker, on home oxygen 2L), Bipolar disorder, and Schizophrenia p/w shortness of breath x 1 day s/p missed HD - admitted to the ICU for acute respiratory failure 2/2 acute pulmonary edema 2/2 missed HD session requiring nasal BIPAP (given clasutrophobia)  ***Primary team to reconcile outpatient medications unclear compliance    Neuro  No acute issues    Cardio  CHFpEF  - ProBNP unchanged, possible diastolic CHF exacerbation 2/2 volume overload, s/op 120 mg Lasix w/ 300 UO  - EKG no acute changes, Troponin #1and 2 negative, trend Dirk  HTN  - Hemodynamically stable, resumed Coreg; holding Hydralazine/Imdur, restart PRN after HD    Resp  Acute respiratory failure 2/2 acute pulmonary edema 2/2 missed HD   - Patient currently refusing nasal BIPAP; C/w NC  COPD  - C/w supplemental oxygen, Duoneb, and Solumedrol given coarse wheezing and rales  ?Hx of undiagnosed KRYSTIN given VBG showing chronic elevations CO2    GI  NPO 2/2 BIPAP  - Advance diet as tolerated    Renal  ESRD  - S/p HD 12/1 for APE  - HD as per Nephrology Dr La and monitor BMP    Endo  IDDM prior A1c 5.3  - Goal  -180; outpatient regimen 15 U premeal but prior admission managed w/ ISS alone  - C/w ISS and FSQ6    Heme  - No acute issues    ID   - No acute issues    Psych  Mood disorder, Schizophrenia, and Chronic Pain  - On multiple psychoactive medications at home; C/w Clonazepam, Risperidone, and Mirtazapine    HSQ for DVT PPx

## 2019-12-01 NOTE — PROGRESS NOTE ADULT - SUBJECTIVE AND OBJECTIVE BOX
INTERVAL HPI/OVERNIGHT EVENTS: Pt will get emergent HD tonight.     PRESSORS: [ ] YES [x ] NO    Antimicrobial:    Cardiovascular:  carvedilol 6.25 milliGRAM(s) Oral every 12 hours    Pulmonary:  albuterol/ipratropium for Nebulization 3 milliLiter(s) Nebulizer every 6 hours    Hematalogic:  clopidogrel Tablet 75 milliGRAM(s) Oral daily  heparin  Injectable 5000 Unit(s) SubCutaneous every 8 hours    Other:  chlorhexidine 4% Liquid 1 Application(s) Topical daily  clonazePAM  Tablet 0.5 milliGRAM(s) Oral every 12 hours PRN  epoetin cyndie Injectable 43986 Unit(s) IV Push <User Schedule>  insulin lispro (HumaLOG) corrective regimen sliding scale   SubCutaneous every 6 hours  methylPREDNISolone sodium succinate Injectable 40 milliGRAM(s) IV Push every 8 hours  mirtazapine 7.5 milliGRAM(s) Oral daily  pantoprazole    Tablet 40 milliGRAM(s) Oral before breakfast  risperiDONE   Tablet 1 milliGRAM(s) Oral every 12 hours  sevelamer carbonate 800 milliGRAM(s) Oral three times a day with meals  simvastatin 40 milliGRAM(s) Oral at bedtime    albuterol/ipratropium for Nebulization 3 milliLiter(s) Nebulizer every 6 hours  carvedilol 6.25 milliGRAM(s) Oral every 12 hours  chlorhexidine 4% Liquid 1 Application(s) Topical daily  clonazePAM  Tablet 0.5 milliGRAM(s) Oral every 12 hours PRN  clopidogrel Tablet 75 milliGRAM(s) Oral daily  epoetin cyndie Injectable 80263 Unit(s) IV Push <User Schedule>  heparin  Injectable 5000 Unit(s) SubCutaneous every 8 hours  insulin lispro (HumaLOG) corrective regimen sliding scale   SubCutaneous every 6 hours  methylPREDNISolone sodium succinate Injectable 40 milliGRAM(s) IV Push every 8 hours  mirtazapine 7.5 milliGRAM(s) Oral daily  pantoprazole    Tablet 40 milliGRAM(s) Oral before breakfast  risperiDONE   Tablet 1 milliGRAM(s) Oral every 12 hours  sevelamer carbonate 800 milliGRAM(s) Oral three times a day with meals  simvastatin 40 milliGRAM(s) Oral at bedtime    Drug Dosing Weight  Height (cm): 200.66 (24 Nov 2019 12:28)  Weight (kg): 206.4 (30 Nov 2019 22:16)  BMI (kg/m2): 51.3 (30 Nov 2019 22:16)  BSA (m2): 3.23 (30 Nov 2019 22:16)    CENTRAL LINE: [ ] YES [x ] NO  LOCATION:   DATE INSERTED:  REMOVE: [ ] YES [ ] NO  EXPLAIN:    ESCALANTE: [ ] YES [ ] NO    DATE INSERTED:  REMOVE:  [ ] YES [ ] NO  EXPLAIN:    A-LINE:  [ ] YES [ ] NO  LOCATION:   DATE INSERTED:  REMOVE:  [ ] YES [ ] NO  EXPLAIN:    PMH -reviewed admission note, no change since admission          PHYSICAL EXAM:    GENERAL: NAD, well-groomed, well-developed  HEAD:  Atraumatic, Normocephalic  EYES: EOMI, PERRLA, conjunctiva and sclera clear  ENMT: No tonsillar erythema, exudates, or enlargement; Moist mucous membranes, Good dentition, [ ]No lesions  NECK: Supple, normal appearance, No JVD; Normal thyroid; Trachea midline  NERVOUS SYSTEM:  Alert & Oriented X3, Good concentration; Motor Strength 5/5 B/L upper and lower extremities; DTRs 2+ intact and symmetric  CHEST/LUNG: No chest deformity; Normal percussion bilaterally; b/l rales  HEART: Regular rate and rhythm; No murmurs, rubs, or gallops  ABDOMEN: Soft, Nontender, Nondistended;Bowel sounds present  EXTREMITIES:  2+ Peripheral Pulses, No clubbing, cyanosis, 3+ pitting edema, b/l LE pain, decreased ROM   LYMPH: No lymphadenopathy noted  SKIN: No rashes or lesions; Good capillary refill      LABS:  CBC Full  -  ( 30 Nov 2019 21:05 )  WBC Count : 10.13 K/uL  RBC Count : 3.20 M/uL  Hemoglobin : 8.7 g/dL  Hematocrit : 29.7 %  Platelet Count - Automated : 322 K/uL  Mean Cell Volume : 92.8 fl  Mean Cell Hemoglobin : 27.2 pg  Mean Cell Hemoglobin Concentration : 29.3 gm/dL  Auto Neutrophil # : 7.44 K/uL  Auto Lymphocyte # : 0.72 K/uL  Auto Monocyte # : 1.29 K/uL  Auto Eosinophil # : 0.58 K/uL  Auto Basophil # : 0.06 K/uL  Auto Neutrophil % : 73.5 %  Auto Lymphocyte % : 7.1 %  Auto Monocyte % : 12.7 %  Auto Eosinophil % : 5.7 %  Auto Basophil % : 0.6 %    11-30    132<L>  |  93<L>  |  70<H>  ----------------------------<  142<H>  5.0   |  28  |  10.70<H>    Ca    8.6      30 Nov 2019 20:41  Mg     2.3     11-30    TPro  7.7  /  Alb  2.6<L>  /  TBili  0.7  /  DBili  x   /  AST  20  /  ALT  17  /  AlkPhos  298<H>  11-30    PT/INR - ( 30 Nov 2019 20:41 )   PT: 13.2 sec;   INR: 1.18 ratio         PTT - ( 30 Nov 2019 20:41 )  PTT:35.1 sec        RADIOLOGY & ADDITIONAL STUDIES REVIEWED:  ***    [ ]GOALS OF CARE DISCUSSION WITH PATIENT/FAMILY/PROXY:    CRITICAL CARE TIME SPENT: 35 minutes INTERVAL HPI/OVERNIGHT EVENTS: Pt received emergent HD.     PRESSORS: [ ] YES [x ] NO    Antimicrobial:    Cardiovascular:  carvedilol 6.25 milliGRAM(s) Oral every 12 hours    Pulmonary:  albuterol/ipratropium for Nebulization 3 milliLiter(s) Nebulizer every 6 hours    Hematalogic:  clopidogrel Tablet 75 milliGRAM(s) Oral daily  heparin  Injectable 5000 Unit(s) SubCutaneous every 8 hours    Other:  chlorhexidine 4% Liquid 1 Application(s) Topical daily  clonazePAM  Tablet 0.5 milliGRAM(s) Oral every 12 hours PRN  epoetin cyndie Injectable 82533 Unit(s) IV Push <User Schedule>  insulin lispro (HumaLOG) corrective regimen sliding scale   SubCutaneous every 6 hours  methylPREDNISolone sodium succinate Injectable 40 milliGRAM(s) IV Push every 8 hours  mirtazapine 7.5 milliGRAM(s) Oral daily  pantoprazole    Tablet 40 milliGRAM(s) Oral before breakfast  risperiDONE   Tablet 1 milliGRAM(s) Oral every 12 hours  sevelamer carbonate 800 milliGRAM(s) Oral three times a day with meals  simvastatin 40 milliGRAM(s) Oral at bedtime    albuterol/ipratropium for Nebulization 3 milliLiter(s) Nebulizer every 6 hours  carvedilol 6.25 milliGRAM(s) Oral every 12 hours  chlorhexidine 4% Liquid 1 Application(s) Topical daily  clonazePAM  Tablet 0.5 milliGRAM(s) Oral every 12 hours PRN  clopidogrel Tablet 75 milliGRAM(s) Oral daily  epoetin cyndie Injectable 02787 Unit(s) IV Push <User Schedule>  heparin  Injectable 5000 Unit(s) SubCutaneous every 8 hours  insulin lispro (HumaLOG) corrective regimen sliding scale   SubCutaneous every 6 hours  methylPREDNISolone sodium succinate Injectable 40 milliGRAM(s) IV Push every 8 hours  mirtazapine 7.5 milliGRAM(s) Oral daily  pantoprazole    Tablet 40 milliGRAM(s) Oral before breakfast  risperiDONE   Tablet 1 milliGRAM(s) Oral every 12 hours  sevelamer carbonate 800 milliGRAM(s) Oral three times a day with meals  simvastatin 40 milliGRAM(s) Oral at bedtime    Drug Dosing Weight  Height (cm): 200.66 (24 Nov 2019 12:28)  Weight (kg): 206.4 (30 Nov 2019 22:16)  BMI (kg/m2): 51.3 (30 Nov 2019 22:16)  BSA (m2): 3.23 (30 Nov 2019 22:16)    CENTRAL LINE: [ ] YES [x ] NO  LOCATION:   DATE INSERTED:  REMOVE: [ ] YES [ ] NO  EXPLAIN:    ESCALANTE: [ ] YES [ ] NO    DATE INSERTED:  REMOVE:  [ ] YES [ ] NO  EXPLAIN:    A-LINE:  [ ] YES [ ] NO  LOCATION:   DATE INSERTED:  REMOVE:  [ ] YES [ ] NO  EXPLAIN:    PMH -reviewed admission note, no change since admission          PHYSICAL EXAM:    GENERAL: NAD, well-groomed, well-developed  HEAD:  Atraumatic, Normocephalic  EYES: EOMI, PERRLA, conjunctiva and sclera clear  ENMT: No tonsillar erythema, exudates, or enlargement; Moist mucous membranes, Good dentition, [ ]No lesions  NECK: Supple, normal appearance, No JVD; Normal thyroid; Trachea midline  NERVOUS SYSTEM:  Alert & Oriented X3, Good concentration; Motor Strength 5/5 B/L upper and lower extremities; DTRs 2+ intact and symmetric  CHEST/LUNG: No chest deformity; Normal percussion bilaterally; b/l rales  HEART: Regular rate and rhythm; No murmurs, rubs, or gallops  ABDOMEN: Soft, Nontender, Nondistended;Bowel sounds present  EXTREMITIES:  2+ Peripheral Pulses, No clubbing, cyanosis, 3+ pitting edema, b/l LE pain, decreased ROM   LYMPH: No lymphadenopathy noted  SKIN: No rashes or lesions; Good capillary refill      LABS:  CBC Full  -  ( 30 Nov 2019 21:05 )  WBC Count : 10.13 K/uL  RBC Count : 3.20 M/uL  Hemoglobin : 8.7 g/dL  Hematocrit : 29.7 %  Platelet Count - Automated : 322 K/uL  Mean Cell Volume : 92.8 fl  Mean Cell Hemoglobin : 27.2 pg  Mean Cell Hemoglobin Concentration : 29.3 gm/dL  Auto Neutrophil # : 7.44 K/uL  Auto Lymphocyte # : 0.72 K/uL  Auto Monocyte # : 1.29 K/uL  Auto Eosinophil # : 0.58 K/uL  Auto Basophil # : 0.06 K/uL  Auto Neutrophil % : 73.5 %  Auto Lymphocyte % : 7.1 %  Auto Monocyte % : 12.7 %  Auto Eosinophil % : 5.7 %  Auto Basophil % : 0.6 %    11-30    132<L>  |  93<L>  |  70<H>  ----------------------------<  142<H>  5.0   |  28  |  10.70<H>    Ca    8.6      30 Nov 2019 20:41  Mg     2.3     11-30    TPro  7.7  /  Alb  2.6<L>  /  TBili  0.7  /  DBili  x   /  AST  20  /  ALT  17  /  AlkPhos  298<H>  11-30    PT/INR - ( 30 Nov 2019 20:41 )   PT: 13.2 sec;   INR: 1.18 ratio         PTT - ( 30 Nov 2019 20:41 )  PTT:35.1 sec        RADIOLOGY & ADDITIONAL STUDIES REVIEWED:  ***    [ ]GOALS OF CARE DISCUSSION WITH PATIENT/FAMILY/PROXY:    CRITICAL CARE TIME SPENT: 35 minutes

## 2019-12-02 LAB
ALBUMIN SERPL ELPH-MCNC: 2.5 G/DL — LOW (ref 3.5–5)
ALP SERPL-CCNC: 258 U/L — HIGH (ref 40–120)
ALT FLD-CCNC: 15 U/L DA — SIGNIFICANT CHANGE UP (ref 10–60)
ANION GAP SERPL CALC-SCNC: 13 MMOL/L — SIGNIFICANT CHANGE UP (ref 5–17)
AST SERPL-CCNC: 7 U/L — LOW (ref 10–40)
BASOPHILS # BLD AUTO: 0 K/UL — SIGNIFICANT CHANGE UP (ref 0–0.2)
BASOPHILS NFR BLD AUTO: 0 % — SIGNIFICANT CHANGE UP (ref 0–2)
BILIRUB SERPL-MCNC: 0.5 MG/DL — SIGNIFICANT CHANGE UP (ref 0.2–1.2)
BUN SERPL-MCNC: 81 MG/DL — HIGH (ref 7–18)
CALCIUM SERPL-MCNC: 9 MG/DL — SIGNIFICANT CHANGE UP (ref 8.4–10.5)
CHLORIDE SERPL-SCNC: 90 MMOL/L — LOW (ref 96–108)
CO2 SERPL-SCNC: 27 MMOL/L — SIGNIFICANT CHANGE UP (ref 22–31)
CREAT SERPL-MCNC: 9.55 MG/DL — HIGH (ref 0.5–1.3)
EOSINOPHIL # BLD AUTO: 0 K/UL — SIGNIFICANT CHANGE UP (ref 0–0.5)
EOSINOPHIL NFR BLD AUTO: 0 % — SIGNIFICANT CHANGE UP (ref 0–6)
GLUCOSE SERPL-MCNC: 229 MG/DL — HIGH (ref 70–99)
HBV SURFACE AG SER-ACNC: SIGNIFICANT CHANGE UP
HCT VFR BLD CALC: 31.7 % — LOW (ref 39–50)
HGB BLD-MCNC: 9.3 G/DL — LOW (ref 13–17)
IMM GRANULOCYTES NFR BLD AUTO: 0.7 % — SIGNIFICANT CHANGE UP (ref 0–1.5)
LYMPHOCYTES # BLD AUTO: 0.46 K/UL — LOW (ref 1–3.3)
LYMPHOCYTES # BLD AUTO: 5.3 % — LOW (ref 13–44)
MAGNESIUM SERPL-MCNC: 2.8 MG/DL — HIGH (ref 1.6–2.6)
MCHC RBC-ENTMCNC: 27.5 PG — SIGNIFICANT CHANGE UP (ref 27–34)
MCHC RBC-ENTMCNC: 29.3 GM/DL — LOW (ref 32–36)
MCV RBC AUTO: 93.8 FL — SIGNIFICANT CHANGE UP (ref 80–100)
MONOCYTES # BLD AUTO: 0.54 K/UL — SIGNIFICANT CHANGE UP (ref 0–0.9)
MONOCYTES NFR BLD AUTO: 6.3 % — SIGNIFICANT CHANGE UP (ref 2–14)
NEUTROPHILS # BLD AUTO: 7.55 K/UL — HIGH (ref 1.8–7.4)
NEUTROPHILS NFR BLD AUTO: 87.7 % — HIGH (ref 43–77)
NRBC # BLD: 0 /100 WBCS — SIGNIFICANT CHANGE UP (ref 0–0)
PHOSPHATE SERPL-MCNC: 8.2 MG/DL — HIGH (ref 2.5–4.5)
PLATELET # BLD AUTO: 309 K/UL — SIGNIFICANT CHANGE UP (ref 150–400)
POTASSIUM SERPL-MCNC: 5.9 MMOL/L — HIGH (ref 3.5–5.3)
POTASSIUM SERPL-SCNC: 5.9 MMOL/L — HIGH (ref 3.5–5.3)
PROT SERPL-MCNC: 7.9 G/DL — SIGNIFICANT CHANGE UP (ref 6–8.3)
RBC # BLD: 3.38 M/UL — LOW (ref 4.2–5.8)
RBC # FLD: 18 % — HIGH (ref 10.3–14.5)
SODIUM SERPL-SCNC: 130 MMOL/L — LOW (ref 135–145)
WBC # BLD: 8.61 K/UL — SIGNIFICANT CHANGE UP (ref 3.8–10.5)
WBC # FLD AUTO: 8.61 K/UL — SIGNIFICANT CHANGE UP (ref 3.8–10.5)

## 2019-12-02 RX ORDER — SEVELAMER CARBONATE 2400 MG/1
1600 POWDER, FOR SUSPENSION ORAL
Refills: 0 | Status: DISCONTINUED | OUTPATIENT
Start: 2019-12-02 | End: 2019-12-13

## 2019-12-02 RX ADMIN — OXYCODONE AND ACETAMINOPHEN 1 TABLET(S): 5; 325 TABLET ORAL at 14:36

## 2019-12-02 RX ADMIN — Medication 3 MILLILITER(S): at 20:23

## 2019-12-02 RX ADMIN — CARVEDILOL PHOSPHATE 6.25 MILLIGRAM(S): 80 CAPSULE, EXTENDED RELEASE ORAL at 05:23

## 2019-12-02 RX ADMIN — Medication 40 MILLIGRAM(S): at 05:23

## 2019-12-02 RX ADMIN — Medication 3 MILLILITER(S): at 14:45

## 2019-12-02 RX ADMIN — Medication 40 MILLIGRAM(S): at 21:01

## 2019-12-02 RX ADMIN — METHOCARBAMOL 500 MILLIGRAM(S): 500 TABLET, FILM COATED ORAL at 17:46

## 2019-12-02 RX ADMIN — OXYCODONE AND ACETAMINOPHEN 1 TABLET(S): 5; 325 TABLET ORAL at 12:06

## 2019-12-02 RX ADMIN — PANTOPRAZOLE SODIUM 40 MILLIGRAM(S): 20 TABLET, DELAYED RELEASE ORAL at 05:23

## 2019-12-02 RX ADMIN — Medication 2: at 23:39

## 2019-12-02 RX ADMIN — Medication 1: at 06:57

## 2019-12-02 RX ADMIN — CLOPIDOGREL BISULFATE 75 MILLIGRAM(S): 75 TABLET, FILM COATED ORAL at 15:02

## 2019-12-02 RX ADMIN — OXYCODONE AND ACETAMINOPHEN 1 TABLET(S): 5; 325 TABLET ORAL at 05:23

## 2019-12-02 RX ADMIN — SEVELAMER CARBONATE 1600 MILLIGRAM(S): 2400 POWDER, FOR SUSPENSION ORAL at 17:46

## 2019-12-02 RX ADMIN — RISPERIDONE 1 MILLIGRAM(S): 4 TABLET ORAL at 05:23

## 2019-12-02 RX ADMIN — METHOCARBAMOL 500 MILLIGRAM(S): 500 TABLET, FILM COATED ORAL at 05:23

## 2019-12-02 RX ADMIN — Medication 3 MILLILITER(S): at 02:46

## 2019-12-02 RX ADMIN — HEPARIN SODIUM 5000 UNIT(S): 5000 INJECTION INTRAVENOUS; SUBCUTANEOUS at 05:23

## 2019-12-02 RX ADMIN — CHLORHEXIDINE GLUCONATE 1 APPLICATION(S): 213 SOLUTION TOPICAL at 15:04

## 2019-12-02 RX ADMIN — Medication 100 MILLIGRAM(S): at 05:23

## 2019-12-02 RX ADMIN — HEPARIN SODIUM 5000 UNIT(S): 5000 INJECTION INTRAVENOUS; SUBCUTANEOUS at 21:02

## 2019-12-02 RX ADMIN — HEPARIN SODIUM 5000 UNIT(S): 5000 INJECTION INTRAVENOUS; SUBCUTANEOUS at 15:02

## 2019-12-02 RX ADMIN — RISPERIDONE 1 MILLIGRAM(S): 4 TABLET ORAL at 17:46

## 2019-12-02 RX ADMIN — ERYTHROPOIETIN 10000 UNIT(S): 10000 INJECTION, SOLUTION INTRAVENOUS; SUBCUTANEOUS at 11:59

## 2019-12-02 RX ADMIN — SEVELAMER CARBONATE 800 MILLIGRAM(S): 2400 POWDER, FOR SUSPENSION ORAL at 08:30

## 2019-12-02 RX ADMIN — SIMVASTATIN 40 MILLIGRAM(S): 20 TABLET, FILM COATED ORAL at 21:01

## 2019-12-02 RX ADMIN — MIRTAZAPINE 7.5 MILLIGRAM(S): 45 TABLET, ORALLY DISINTEGRATING ORAL at 15:02

## 2019-12-02 RX ADMIN — Medication 2: at 17:46

## 2019-12-02 RX ADMIN — Medication 40 MILLIGRAM(S): at 15:03

## 2019-12-02 RX ADMIN — CARVEDILOL PHOSPHATE 6.25 MILLIGRAM(S): 80 CAPSULE, EXTENDED RELEASE ORAL at 17:46

## 2019-12-02 RX ADMIN — Medication 3 MILLILITER(S): at 08:40

## 2019-12-02 NOTE — PROGRESS NOTE ADULT - SUBJECTIVE AND OBJECTIVE BOX
INTERVAL HPI/OVERNIGHT EVENTS: Pt is saturating well on NC, asking when he will leave the ICU. Complains of chronic leg pain.     PRESSORS: [ ] YES [x ] NO    Antimicrobial:    Cardiovascular:  carvedilol 6.25 milliGRAM(s) Oral every 12 hours    Pulmonary:  albuterol/ipratropium for Nebulization 3 milliLiter(s) Nebulizer every 6 hours  guaiFENesin    Syrup 100 milliGRAM(s) Oral every 6 hours PRN    Hematalogic:  clopidogrel Tablet 75 milliGRAM(s) Oral daily  heparin  Injectable 5000 Unit(s) SubCutaneous every 8 hours    Other:  chlorhexidine 4% Liquid 1 Application(s) Topical daily  clonazePAM  Tablet 0.5 milliGRAM(s) Oral every 12 hours PRN  epoetin cyndie Injectable 55074 Unit(s) IV Push <User Schedule>  insulin lispro (HumaLOG) corrective regimen sliding scale   SubCutaneous every 6 hours  methocarbamol 500 milliGRAM(s) Oral two times a day  methylPREDNISolone sodium succinate Injectable 40 milliGRAM(s) IV Push every 8 hours  mirtazapine 7.5 milliGRAM(s) Oral daily  oxycodone    5 mG/acetaminophen 325 mG 1 Tablet(s) Oral every 6 hours PRN  pantoprazole    Tablet 40 milliGRAM(s) Oral before breakfast  risperiDONE   Tablet 1 milliGRAM(s) Oral every 12 hours  sevelamer carbonate 800 milliGRAM(s) Oral three times a day with meals  simvastatin 40 milliGRAM(s) Oral at bedtime    albuterol/ipratropium for Nebulization 3 milliLiter(s) Nebulizer every 6 hours  carvedilol 6.25 milliGRAM(s) Oral every 12 hours  chlorhexidine 4% Liquid 1 Application(s) Topical daily  clonazePAM  Tablet 0.5 milliGRAM(s) Oral every 12 hours PRN  clopidogrel Tablet 75 milliGRAM(s) Oral daily  epoetin cyndie Injectable 19451 Unit(s) IV Push <User Schedule>  guaiFENesin    Syrup 100 milliGRAM(s) Oral every 6 hours PRN  heparin  Injectable 5000 Unit(s) SubCutaneous every 8 hours  insulin lispro (HumaLOG) corrective regimen sliding scale   SubCutaneous every 6 hours  methocarbamol 500 milliGRAM(s) Oral two times a day  methylPREDNISolone sodium succinate Injectable 40 milliGRAM(s) IV Push every 8 hours  mirtazapine 7.5 milliGRAM(s) Oral daily  oxycodone    5 mG/acetaminophen 325 mG 1 Tablet(s) Oral every 6 hours PRN  pantoprazole    Tablet 40 milliGRAM(s) Oral before breakfast  risperiDONE   Tablet 1 milliGRAM(s) Oral every 12 hours  sevelamer carbonate 800 milliGRAM(s) Oral three times a day with meals  simvastatin 40 milliGRAM(s) Oral at bedtime    Drug Dosing Weight  Height (cm): 200.66 (24 Nov 2019 12:28)  Weight (kg): 206.4 (30 Nov 2019 22:16)  BMI (kg/m2): 51.3 (30 Nov 2019 22:16)  BSA (m2): 3.23 (30 Nov 2019 22:16)    CENTRAL LINE: [ ] YES [x ] NO  LOCATION:   DATE INSERTED:  REMOVE: [ ] YES [ ] NO  EXPLAIN:    ESCALANTE: [ ] YES [x ] NO    DATE INSERTED:  REMOVE:  [ ] YES [ ] NO  EXPLAIN:    A-LINE:  [ ] YES [x ] NO  LOCATION:   DATE INSERTED:  REMOVE:  [ ] YES [ ] NO  EXPLAIN:    [facility] -reviewed admission note, no change since admission            12-01 @ 07:01  -  12-02 @ 07:00  --------------------------------------------------------  IN: 1200 mL / OUT: 200 mL / NET: 1000 mL            PHYSICAL EXAM:    GENERAL: NAD, well-groomed, morbidly obese young man  HEAD:  Atraumatic, Normocephalic  EYES: EOMI, PERRLA, conjunctiva and sclera clear  ENMT: No tonsillar erythema, exudates, or enlargement; Moist mucous membranes, Good dentition,No lesions  NECK: Supple, normal appearance, No JVD; Normal thyroid; Trachea midline  NERVOUS SYSTEM:  Alert & Oriented X3, Good concentration  CHEST/LUNG: No chest deformity; Normal percussion bilaterally; rales bilaterally  HEART: Regular rate and rhythm; No murmurs, rubs, or gallops  ABDOMEN: Soft, Nontender, Nondistended;Bowel sounds present  EXTREMITIES:  2+ Peripheral Pulses, pitting edema of b/l LE, increased sensitivity and extremely tender to palpation  LYMPH: No lymphadenopathy noted  SKIN: No rashes or lesions; Good capillary refill      LABS:  CBC Full  -  ( 01 Dec 2019 06:37 )  WBC Count : 7.76 K/uL  RBC Count : 3.76 M/uL  Hemoglobin : 10.3 g/dL  Hematocrit : 36.0 %  Platelet Count - Automated : 307 K/uL  Mean Cell Volume : 95.7 fl  Mean Cell Hemoglobin : 27.4 pg  Mean Cell Hemoglobin Concentration : 28.6 gm/dL  Auto Neutrophil # : 7.37 K/uL  Auto Lymphocyte # : 0.39 K/uL  Auto Monocyte # : 0.00 K/uL  Auto Eosinophil # : 0.00 K/uL  Auto Basophil # : 0.00 K/uL  Auto Neutrophil % : 95.0 %  Auto Lymphocyte % : 5.0 %  Auto Monocyte % : 0.0 %  Auto Eosinophil % : 0.0 %  Auto Basophil % : 0.0 %    12-02    130<L>  |  90<L>  |  81<H>  ----------------------------<  229<H>  5.9<H>   |  27  |  9.55<H>    Ca    9.0      02 Dec 2019 10:18  Phos  8.2     12-02  Mg     2.8     12-02    TPro  7.9  /  Alb  2.5<L>  /  TBili  0.5  /  DBili  x   /  AST  7<L>  /  ALT  15  /  AlkPhos  258<H>  12-02    PT/INR - ( 30 Nov 2019 20:41 )   PT: 13.2 sec;   INR: 1.18 ratio         PTT - ( 30 Nov 2019 20:41 )  PTT:35.1 sec        RADIOLOGY & ADDITIONAL STUDIES REVIEWED:  ***    [ ]GOALS OF CARE DISCUSSION WITH PATIENT/FAMILY/PROXY:    CRITICAL CARE TIME SPENT: 35 minutes

## 2019-12-02 NOTE — PROGRESS NOTE ADULT - SUBJECTIVE AND OBJECTIVE BOX
[facility] : Progress Note :: [phone], (office [phone]),   Dr La / Dr Hui / Dr Barber / Dr Villalobos / Dr Hang CASTELLANO / Dr Mehta / Dr Sanchez / Dr Alber dumont  _____________________________________________________________________________________________  seen on HD     aspirin (Short breath)  aspirin (Swelling)  fish (Unknown)  penicillin (Short breath)  penicillins (Swelling)  shellfish (Unknown)    Hospital Medications:   MEDICATIONS  (STANDING):  albuterol/ipratropium for Nebulization 3 milliLiter(s) Nebulizer every 6 hours  carvedilol 6.25 milliGRAM(s) Oral every 12 hours  chlorhexidine 4% Liquid 1 Application(s) Topical daily  clopidogrel Tablet 75 milliGRAM(s) Oral daily  epoetin cyndie Injectable 95767 Unit(s) IV Push <User Schedule>  heparin  Injectable 5000 Unit(s) SubCutaneous every 8 hours  insulin lispro (HumaLOG) corrective regimen sliding scale   SubCutaneous every 6 hours  methocarbamol 500 milliGRAM(s) Oral two times a day  methylPREDNISolone sodium succinate Injectable 40 milliGRAM(s) IV Push every 8 hours  mirtazapine 7.5 milliGRAM(s) Oral daily  pantoprazole    Tablet 40 milliGRAM(s) Oral before breakfast  risperiDONE   Tablet 1 milliGRAM(s) Oral every 12 hours  sevelamer carbonate 800 milliGRAM(s) Oral three times a day with meals  simvastatin 40 milliGRAM(s) Oral at bedtime        VITALS:  T(F): 98.2 (12-02-19 @ 10:00), Max: 98.2 (12-02-19 @ 10:00)  HR: 64 (12-02-19 @ 10:00)  BP: 144/90 (12-02-19 @ 10:00)  RR: 21 (12-02-19 @ 10:00)  SpO2: 99% (12-02-19 @ 10:00)  Wt(kg): --    12-01 @ 07:01  -  12-02 @ 07:00  --------------------------------------------------------  IN: 1200 mL / OUT: 200 mL / NET: 1000 mL    12-02 @ 07:01  -  12-02 @ 13:52  --------------------------------------------------------  IN: 300 mL / OUT: 100 mL / NET: 200 mL        PHYSICAL EXAM:  Constitutional: NAD  HEENT: anicteric sclera, oropharynx clear.  Neck: No JVD  Respiratory: CTAB, no wheezes, rales or rhonchi  Cardiovascular: S1, S2, RRR  Gastrointestinal: BS+, soft, NT/ND  Extremities:  No peripheral edema  Neurological: A/O x 3, no focal deficits  Vascular Access: AVF cannulated    LABS:  12-02    130<L>  |  90<L>  |  81<H>  ----------------------------<  229<H>  5.9<H>   |  27  |  9.55<H>    Ca    9.0      02 Dec 2019 10:18  Phos  8.2     12-02  Mg     2.8     12-02    TPro  7.9  /  Alb  2.5<L>  /  TBili  0.5  /  DBili      /  AST  7<L>  /  ALT  15  /  AlkPhos  258<H>  12-02    Creatinine Trend: 9.55 <--, 8.14 <--, 10.70 <--                        10.3   7.76  )-----------( 307      ( 01 Dec 2019 06:37 )             36.0     Urine Studies:      RADIOLOGY & ADDITIONAL STUDIES:

## 2019-12-02 NOTE — PROGRESS NOTE ADULT - ASSESSMENT
# ESRD. Sen on HD. UF as tolerated  #hyperkalemia. HD against a 2K+ bath   #HTN controlled  #hyponatremia from hypervolemia. Control of fluid intake re-inforced  # Chronic Kidney Disease Stage . elevated phos. Increase dose of sevelamer  # anemia of CKD. Procrit on HD

## 2019-12-03 LAB
ALBUMIN SERPL ELPH-MCNC: 2.6 G/DL — LOW (ref 3.5–5)
ALP SERPL-CCNC: 233 U/L — HIGH (ref 40–120)
ALT FLD-CCNC: 15 U/L DA — SIGNIFICANT CHANGE UP (ref 10–60)
ANION GAP SERPL CALC-SCNC: 11 MMOL/L — SIGNIFICANT CHANGE UP (ref 5–17)
AST SERPL-CCNC: 6 U/L — LOW (ref 10–40)
BILIRUB SERPL-MCNC: 0.5 MG/DL — SIGNIFICANT CHANGE UP (ref 0.2–1.2)
BUN SERPL-MCNC: 75 MG/DL — HIGH (ref 7–18)
CALCIUM SERPL-MCNC: 8.6 MG/DL — SIGNIFICANT CHANGE UP (ref 8.4–10.5)
CHLORIDE SERPL-SCNC: 94 MMOL/L — LOW (ref 96–108)
CO2 SERPL-SCNC: 24 MMOL/L — SIGNIFICANT CHANGE UP (ref 22–31)
CREAT SERPL-MCNC: 8.17 MG/DL — HIGH (ref 0.5–1.3)
GLUCOSE SERPL-MCNC: 240 MG/DL — HIGH (ref 70–99)
HCT VFR BLD CALC: 32.6 % — LOW (ref 39–50)
HGB BLD-MCNC: 9.2 G/DL — LOW (ref 13–17)
MAGNESIUM SERPL-MCNC: 2.6 MG/DL — SIGNIFICANT CHANGE UP (ref 1.6–2.6)
MCHC RBC-ENTMCNC: 27.2 PG — SIGNIFICANT CHANGE UP (ref 27–34)
MCHC RBC-ENTMCNC: 28.2 GM/DL — LOW (ref 32–36)
MCV RBC AUTO: 96.4 FL — SIGNIFICANT CHANGE UP (ref 80–100)
NRBC # BLD: 0 /100 WBCS — SIGNIFICANT CHANGE UP (ref 0–0)
PHOSPHATE SERPL-MCNC: 7.9 MG/DL — HIGH (ref 2.5–4.5)
PLATELET # BLD AUTO: 289 K/UL — SIGNIFICANT CHANGE UP (ref 150–400)
POTASSIUM SERPL-MCNC: 5.9 MMOL/L — HIGH (ref 3.5–5.3)
POTASSIUM SERPL-SCNC: 5.9 MMOL/L — HIGH (ref 3.5–5.3)
PROT SERPL-MCNC: 7.8 G/DL — SIGNIFICANT CHANGE UP (ref 6–8.3)
RBC # BLD: 3.38 M/UL — LOW (ref 4.2–5.8)
RBC # FLD: 17.9 % — HIGH (ref 10.3–14.5)
SODIUM SERPL-SCNC: 129 MMOL/L — LOW (ref 135–145)
WBC # BLD: 9.32 K/UL — SIGNIFICANT CHANGE UP (ref 3.8–10.5)
WBC # FLD AUTO: 9.32 K/UL — SIGNIFICANT CHANGE UP (ref 3.8–10.5)

## 2019-12-03 RX ORDER — POLYETHYLENE GLYCOL 3350 17 G/17G
17 POWDER, FOR SOLUTION ORAL EVERY 12 HOURS
Refills: 0 | Status: DISCONTINUED | OUTPATIENT
Start: 2019-12-03 | End: 2019-12-13

## 2019-12-03 RX ORDER — SENNA PLUS 8.6 MG/1
2 TABLET ORAL AT BEDTIME
Refills: 0 | Status: DISCONTINUED | OUTPATIENT
Start: 2019-12-03 | End: 2019-12-13

## 2019-12-03 RX ORDER — ISOSORBIDE MONONITRATE 60 MG/1
30 TABLET, EXTENDED RELEASE ORAL DAILY
Refills: 0 | Status: DISCONTINUED | OUTPATIENT
Start: 2019-12-03 | End: 2019-12-13

## 2019-12-03 RX ORDER — MULTIVIT WITH MIN/MFOLATE/K2 340-15/3 G
1 POWDER (GRAM) ORAL AT BEDTIME
Refills: 0 | Status: COMPLETED | OUTPATIENT
Start: 2019-12-03 | End: 2019-12-03

## 2019-12-03 RX ORDER — HYDRALAZINE HCL 50 MG
25 TABLET ORAL THREE TIMES A DAY
Refills: 0 | Status: DISCONTINUED | OUTPATIENT
Start: 2019-12-03 | End: 2019-12-13

## 2019-12-03 RX ORDER — SODIUM ZIRCONIUM CYCLOSILICATE 10 G/10G
5 POWDER, FOR SUSPENSION ORAL DAILY
Refills: 0 | Status: COMPLETED | OUTPATIENT
Start: 2019-12-03 | End: 2019-12-05

## 2019-12-03 RX ADMIN — RISPERIDONE 1 MILLIGRAM(S): 4 TABLET ORAL at 19:00

## 2019-12-03 RX ADMIN — SEVELAMER CARBONATE 1600 MILLIGRAM(S): 2400 POWDER, FOR SUSPENSION ORAL at 13:52

## 2019-12-03 RX ADMIN — Medication 25 MILLIGRAM(S): at 22:20

## 2019-12-03 RX ADMIN — SIMVASTATIN 40 MILLIGRAM(S): 20 TABLET, FILM COATED ORAL at 22:17

## 2019-12-03 RX ADMIN — HEPARIN SODIUM 5000 UNIT(S): 5000 INJECTION INTRAVENOUS; SUBCUTANEOUS at 13:53

## 2019-12-03 RX ADMIN — MIRTAZAPINE 7.5 MILLIGRAM(S): 45 TABLET, ORALLY DISINTEGRATING ORAL at 13:52

## 2019-12-03 RX ADMIN — POLYETHYLENE GLYCOL 3350 17 GRAM(S): 17 POWDER, FOR SOLUTION ORAL at 13:54

## 2019-12-03 RX ADMIN — METHOCARBAMOL 500 MILLIGRAM(S): 500 TABLET, FILM COATED ORAL at 19:01

## 2019-12-03 RX ADMIN — Medication 3 MILLILITER(S): at 08:40

## 2019-12-03 RX ADMIN — Medication 3 MILLILITER(S): at 02:12

## 2019-12-03 RX ADMIN — SEVELAMER CARBONATE 1600 MILLIGRAM(S): 2400 POWDER, FOR SUSPENSION ORAL at 07:58

## 2019-12-03 RX ADMIN — Medication 0.5 MILLIGRAM(S): at 13:51

## 2019-12-03 RX ADMIN — Medication 100 MILLIGRAM(S): at 19:01

## 2019-12-03 RX ADMIN — CHLORHEXIDINE GLUCONATE 1 APPLICATION(S): 213 SOLUTION TOPICAL at 13:51

## 2019-12-03 RX ADMIN — METHOCARBAMOL 500 MILLIGRAM(S): 500 TABLET, FILM COATED ORAL at 06:17

## 2019-12-03 RX ADMIN — OXYCODONE AND ACETAMINOPHEN 1 TABLET(S): 5; 325 TABLET ORAL at 22:28

## 2019-12-03 RX ADMIN — CARVEDILOL PHOSPHATE 6.25 MILLIGRAM(S): 80 CAPSULE, EXTENDED RELEASE ORAL at 19:01

## 2019-12-03 RX ADMIN — Medication 2: at 06:24

## 2019-12-03 RX ADMIN — Medication 2: at 13:50

## 2019-12-03 RX ADMIN — HEPARIN SODIUM 5000 UNIT(S): 5000 INJECTION INTRAVENOUS; SUBCUTANEOUS at 22:17

## 2019-12-03 RX ADMIN — SEVELAMER CARBONATE 1600 MILLIGRAM(S): 2400 POWDER, FOR SUSPENSION ORAL at 19:01

## 2019-12-03 RX ADMIN — ISOSORBIDE MONONITRATE 30 MILLIGRAM(S): 60 TABLET, EXTENDED RELEASE ORAL at 13:52

## 2019-12-03 RX ADMIN — Medication 40 MILLIGRAM(S): at 06:17

## 2019-12-03 RX ADMIN — Medication 3 MILLILITER(S): at 20:21

## 2019-12-03 RX ADMIN — SODIUM ZIRCONIUM CYCLOSILICATE 5 GRAM(S): 10 POWDER, FOR SUSPENSION ORAL at 13:55

## 2019-12-03 RX ADMIN — OXYCODONE AND ACETAMINOPHEN 1 TABLET(S): 5; 325 TABLET ORAL at 09:35

## 2019-12-03 RX ADMIN — OXYCODONE AND ACETAMINOPHEN 1 TABLET(S): 5; 325 TABLET ORAL at 08:23

## 2019-12-03 RX ADMIN — PANTOPRAZOLE SODIUM 40 MILLIGRAM(S): 20 TABLET, DELAYED RELEASE ORAL at 06:17

## 2019-12-03 RX ADMIN — RISPERIDONE 1 MILLIGRAM(S): 4 TABLET ORAL at 06:17

## 2019-12-03 RX ADMIN — CLOPIDOGREL BISULFATE 75 MILLIGRAM(S): 75 TABLET, FILM COATED ORAL at 13:57

## 2019-12-03 RX ADMIN — CARVEDILOL PHOSPHATE 6.25 MILLIGRAM(S): 80 CAPSULE, EXTENDED RELEASE ORAL at 06:17

## 2019-12-03 RX ADMIN — HEPARIN SODIUM 5000 UNIT(S): 5000 INJECTION INTRAVENOUS; SUBCUTANEOUS at 06:17

## 2019-12-03 NOTE — PROGRESS NOTE ADULT - SUBJECTIVE AND OBJECTIVE BOX
INTERVAL HPI/OVERNIGHT EVENTS: ***    PRESSORS: [ ] YES [ ] NO    Antimicrobial:    Cardiovascular:  carvedilol 6.25 milliGRAM(s) Oral every 12 hours  hydrALAZINE 25 milliGRAM(s) Oral three times a day  isosorbide   mononitrate ER Tablet (IMDUR) 30 milliGRAM(s) Oral daily    Pulmonary:  albuterol/ipratropium for Nebulization 3 milliLiter(s) Nebulizer every 6 hours  guaiFENesin    Syrup 100 milliGRAM(s) Oral every 6 hours PRN    Hematalogic:  clopidogrel Tablet 75 milliGRAM(s) Oral daily  heparin  Injectable 5000 Unit(s) SubCutaneous every 8 hours    Other:  chlorhexidine 4% Liquid 1 Application(s) Topical daily  clonazePAM  Tablet 0.5 milliGRAM(s) Oral every 12 hours PRN  epoetin cyndie Injectable 59369 Unit(s) IV Push <User Schedule>  insulin lispro (HumaLOG) corrective regimen sliding scale   SubCutaneous every 6 hours  methocarbamol 500 milliGRAM(s) Oral two times a day  mirtazapine 7.5 milliGRAM(s) Oral daily  oxycodone    5 mG/acetaminophen 325 mG 1 Tablet(s) Oral every 6 hours PRN  polyethylene glycol 3350 17 Gram(s) Oral every 12 hours  risperiDONE   Tablet 1 milliGRAM(s) Oral every 12 hours  senna 2 Tablet(s) Oral at bedtime  sevelamer carbonate 1600 milliGRAM(s) Oral three times a day with meals  simvastatin 40 milliGRAM(s) Oral at bedtime  sodium zirconium cyclosilicate 5 Gram(s) Oral daily    albuterol/ipratropium for Nebulization 3 milliLiter(s) Nebulizer every 6 hours  carvedilol 6.25 milliGRAM(s) Oral every 12 hours  chlorhexidine 4% Liquid 1 Application(s) Topical daily  clonazePAM  Tablet 0.5 milliGRAM(s) Oral every 12 hours PRN  clopidogrel Tablet 75 milliGRAM(s) Oral daily  epoetin cyndie Injectable 45962 Unit(s) IV Push <User Schedule>  guaiFENesin    Syrup 100 milliGRAM(s) Oral every 6 hours PRN  heparin  Injectable 5000 Unit(s) SubCutaneous every 8 hours  hydrALAZINE 25 milliGRAM(s) Oral three times a day  insulin lispro (HumaLOG) corrective regimen sliding scale   SubCutaneous every 6 hours  isosorbide   mononitrate ER Tablet (IMDUR) 30 milliGRAM(s) Oral daily  methocarbamol 500 milliGRAM(s) Oral two times a day  mirtazapine 7.5 milliGRAM(s) Oral daily  oxycodone    5 mG/acetaminophen 325 mG 1 Tablet(s) Oral every 6 hours PRN  polyethylene glycol 3350 17 Gram(s) Oral every 12 hours  risperiDONE   Tablet 1 milliGRAM(s) Oral every 12 hours  senna 2 Tablet(s) Oral at bedtime  sevelamer carbonate 1600 milliGRAM(s) Oral three times a day with meals  simvastatin 40 milliGRAM(s) Oral at bedtime  sodium zirconium cyclosilicate 5 Gram(s) Oral daily    Drug Dosing Weight  Height (cm): 200.66 (24 Nov 2019 12:28)  Weight (kg): 206.4 (30 Nov 2019 22:16)  BMI (kg/m2): 51.3 (30 Nov 2019 22:16)  BSA (m2): 3.23 (30 Nov 2019 22:16)    CENTRAL LINE: [ ] YES [ ] NO  LOCATION:   DATE INSERTED:  REMOVE: [ ] YES [ ] NO  EXPLAIN:    BORIS: [ ] YES [ ] NO    DATE INSERTED:  REMOVE:  [ ] YES [ ] NO  EXPLAIN:    A-LINE:  [ ] YES [ ] NO  LOCATION:   DATE INSERTED:  REMOVE:  [ ] YES [ ] NO  EXPLAIN:    [facility] -reviewed admission note, no change since admission            12-02 @ 07:01  -  12-03 @ 07:00  --------------------------------------------------------  IN: 980 mL / OUT: 250 mL / NET: 730 mL            PHYSICAL EXAM:    GENERAL: NAD, well-groomed, well-developed  HEAD:  Atraumatic, Normocephalic  EYES: EOMI, PERRLA, conjunctiva and sclera clear  ENMT: No tonsillar erythema, exudates, or enlargement; Moist mucous membranes, Good dentition, [ ]No lesions  NECK: Supple, normal appearance, No JVD; Normal thyroid; Trachea midline  NERVOUS SYSTEM:  Alert & Oriented X3, Good concentration; Motor Strength 5/5 B/L upper and lower extremities; DTRs 2+ intact and symmetric  CHEST/LUNG: No chest deformity; Normal percussion bilaterally; No rales, rhonchi, wheezing   HEART: Regular rate and rhythm; No murmurs, rubs, or gallops  ABDOMEN: Soft, Nontender, Nondistended;Bowel sounds present  EXTREMITIES:  2+ Peripheral Pulses, No clubbing, cyanosis, or edema  LYMPH: No lymphadenopathy noted  SKIN: No rashes or lesions; Good capillary refill      LABS:  CBC Full  -  ( 03 Dec 2019 06:43 )  WBC Count : 9.32 K/uL  RBC Count : 3.38 M/uL  Hemoglobin : 9.2 g/dL  Hematocrit : 32.6 %  Platelet Count - Automated : 289 K/uL  Mean Cell Volume : 96.4 fl  Mean Cell Hemoglobin : 27.2 pg  Mean Cell Hemoglobin Concentration : 28.2 gm/dL  Auto Neutrophil # : x  Auto Lymphocyte # : x  Auto Monocyte # : x  Auto Eosinophil # : x  Auto Basophil # : x  Auto Neutrophil % : x  Auto Lymphocyte % : x  Auto Monocyte % : x  Auto Eosinophil % : x  Auto Basophil % : x    12-03    129<L>  |  94<L>  |  75<H>  ----------------------------<  240<H>  5.9<H>   |  24  |  8.17<H>    Ca    8.6      03 Dec 2019 06:43  Phos  7.9     12-03  Mg     2.6     12-03    TPro  7.8  /  Alb  2.6<L>  /  TBili  0.5  /  DBili  x   /  AST  6<L>  /  ALT  15  /  AlkPhos  233<H>  12-03        Culture Results:   No growth to date. (12-01 @ 12:05)  Culture Results:   No growth to date. (12-01 @ 12:05)      RADIOLOGY & ADDITIONAL STUDIES REVIEWED:  ***    [ ]GOALS OF CARE DISCUSSION WITH PATIENT/FAMILY/PROXY:    CRITICAL CARE TIME SPENT: 35 minutes INTERVAL HPI/OVERNIGHT EVENTS: No acute events overnight. Pt has not had any BM for past 3-4 days as per pt. c.o pain in LLQ.  PRESSORS: [ ] YES [x ] NO    Antimicrobial:    Cardiovascular:  carvedilol 6.25 milliGRAM(s) Oral every 12 hours  hydrALAZINE 25 milliGRAM(s) Oral three times a day  isosorbide   mononitrate ER Tablet (IMDUR) 30 milliGRAM(s) Oral daily    Pulmonary:  albuterol/ipratropium for Nebulization 3 milliLiter(s) Nebulizer every 6 hours  guaiFENesin    Syrup 100 milliGRAM(s) Oral every 6 hours PRN    Hematalogic:  clopidogrel Tablet 75 milliGRAM(s) Oral daily  heparin  Injectable 5000 Unit(s) SubCutaneous every 8 hours    Other:  chlorhexidine 4% Liquid 1 Application(s) Topical daily  clonazePAM  Tablet 0.5 milliGRAM(s) Oral every 12 hours PRN  epoetin cyndie Injectable 35062 Unit(s) IV Push <User Schedule>  insulin lispro (HumaLOG) corrective regimen sliding scale   SubCutaneous every 6 hours  methocarbamol 500 milliGRAM(s) Oral two times a day  mirtazapine 7.5 milliGRAM(s) Oral daily  oxycodone    5 mG/acetaminophen 325 mG 1 Tablet(s) Oral every 6 hours PRN  polyethylene glycol 3350 17 Gram(s) Oral every 12 hours  risperiDONE   Tablet 1 milliGRAM(s) Oral every 12 hours  senna 2 Tablet(s) Oral at bedtime  sevelamer carbonate 1600 milliGRAM(s) Oral three times a day with meals  simvastatin 40 milliGRAM(s) Oral at bedtime  sodium zirconium cyclosilicate 5 Gram(s) Oral daily    albuterol/ipratropium for Nebulization 3 milliLiter(s) Nebulizer every 6 hours  carvedilol 6.25 milliGRAM(s) Oral every 12 hours  chlorhexidine 4% Liquid 1 Application(s) Topical daily  clonazePAM  Tablet 0.5 milliGRAM(s) Oral every 12 hours PRN  clopidogrel Tablet 75 milliGRAM(s) Oral daily  epoetin cyndie Injectable 16674 Unit(s) IV Push <User Schedule>  guaiFENesin    Syrup 100 milliGRAM(s) Oral every 6 hours PRN  heparin  Injectable 5000 Unit(s) SubCutaneous every 8 hours  hydrALAZINE 25 milliGRAM(s) Oral three times a day  insulin lispro (HumaLOG) corrective regimen sliding scale   SubCutaneous every 6 hours  isosorbide   mononitrate ER Tablet (IMDUR) 30 milliGRAM(s) Oral daily  methocarbamol 500 milliGRAM(s) Oral two times a day  mirtazapine 7.5 milliGRAM(s) Oral daily  oxycodone    5 mG/acetaminophen 325 mG 1 Tablet(s) Oral every 6 hours PRN  polyethylene glycol 3350 17 Gram(s) Oral every 12 hours  risperiDONE   Tablet 1 milliGRAM(s) Oral every 12 hours  senna 2 Tablet(s) Oral at bedtime  sevelamer carbonate 1600 milliGRAM(s) Oral three times a day with meals  simvastatin 40 milliGRAM(s) Oral at bedtime  sodium zirconium cyclosilicate 5 Gram(s) Oral daily    Drug Dosing Weight  Height (cm): 200.66 (24 Nov 2019 12:28)  Weight (kg): 206.4 (30 Nov 2019 22:16)  BMI (kg/m2): 51.3 (30 Nov 2019 22:16)  BSA (m2): 3.23 (30 Nov 2019 22:16)    CENTRAL LINE: [ ] YES [x ] NO  LOCATION:   DATE INSERTED:  REMOVE: [ ] YES [ ] NO  EXPLAIN:    BORIS: [ ] YES [x ] NO    DATE INSERTED:  REMOVE:  [ ] YES [ ] NO  EXPLAIN:    A-LINE:  [ ] YES [x ] NO  LOCATION:   DATE INSERTED:  REMOVE:  [ ] YES [ ] NO  EXPLAIN:    [facility] -reviewed admission note, no change since admission            12-02 @ 07:01  -  12-03 @ 07:00  --------------------------------------------------------  IN: 980 mL / OUT: 250 mL / NET: 730 mL    PHYSICAL EXAM:    GENERAL: NAD, well-groomed, morbidly obese young man  HEAD:  Atraumatic, Normocephalic  EYES: EOMI, PERRLA, conjunctiva and sclera clear  ENMT: No tonsillar erythema, exudates, or enlargement; Moist mucous membranes, Good dentition,No lesions  NECK: Supple, normal appearance, No JVD; Normal thyroid; Trachea midline  NERVOUS SYSTEM:  Alert & Oriented X3, Good concentration  CHEST/LUNG: No chest deformity; clear to auscultation bilaterally; no wheezing   HEART: Regular rate and rhythm; No murmurs, rubs, or gallops  ABDOMEN: Soft, Nontender, Nondistended;Bowel sounds present  EXTREMITIES:  2+ Peripheral Pulses, pitting edema of b/l LE, increased sensitivity and extremely tender to palpation  LYMPH: No lymphadenopathy noted  SKIN: No rashes or lesions; Good capillary refill      LABS:  CBC Full  -  ( 03 Dec 2019 06:43 )  WBC Count : 9.32 K/uL  RBC Count : 3.38 M/uL  Hemoglobin : 9.2 g/dL  Hematocrit : 32.6 %  Platelet Count - Automated : 289 K/uL  Mean Cell Volume : 96.4 fl  Mean Cell Hemoglobin : 27.2 pg  Mean Cell Hemoglobin Concentration : 28.2 gm/dL  Auto Neutrophil # : x  Auto Lymphocyte # : x  Auto Monocyte # : x  Auto Eosinophil # : x  Auto Basophil # : x  Auto Neutrophil % : x  Auto Lymphocyte % : x  Auto Monocyte % : x  Auto Eosinophil % : x  Auto Basophil % : x    12-03    129<L>  |  94<L>  |  75<H>  ----------------------------<  240<H>  5.9<H>   |  24  |  8.17<H>    Ca    8.6      03 Dec 2019 06:43  Phos  7.9     12-03  Mg     2.6     12-03    TPro  7.8  /  Alb  2.6<L>  /  TBili  0.5  /  DBili  x   /  AST  6<L>  /  ALT  15  /  AlkPhos  233<H>  12-03        Culture Results:   No growth to date. (12-01 @ 12:05)  Culture Results:   No growth to date. (12-01 @ 12:05)      RADIOLOGY & ADDITIONAL STUDIES REVIEWED:      [ ]GOALS OF CARE DISCUSSION WITH PATIENT/FAMILY/PROXY:    CRITICAL CARE TIME SPENT: 35 minutes

## 2019-12-03 NOTE — PHYSICAL THERAPY INITIAL EVALUATION ADULT - LEVEL OF INDEPENDENCE: SUPINE/SIT, REHAB EVAL
We care about your health; the following recommendations(s) have been made today:    Testing ordered today: Please call Central Scheduling at [phone] to schedule your CT  one week prior to your appointment in 6 months.     Please follow up with the above recommendation within the next couple of days, if you have any further questions or concerns, please contact one of our offices:     · [facility] at [phone]  · [facility] at [phone].    May I ask, if you receive a survey in the mail about our visit today, please take the time to complete and return it. It will help us continue to improve our performance and provide you with excellent care.     Thank you for choosing this clinic for your medical care.     Pulmonary and Sleep Medicine  [facility]       minimum assist (75% patients effort)

## 2019-12-03 NOTE — PHYSICAL THERAPY INITIAL EVALUATION ADULT - ADDITIONAL COMMENTS
Pt uses a R/W and a wheelchair while at home. He was able to ambulate for a very limited distance using the R/W but does not have aerobic capacity and has increasing pain where he needs to stop. Pt reports his parents have helped him with ADLs and transferring.

## 2019-12-03 NOTE — PHYSICAL THERAPY INITIAL EVALUATION ADULT - DIAGNOSIS, PT EVAL
Pt is a 38 y/o male who presents with acute respiratory failure and acute pulmonary edema after missing HD. Pt demonstrates bilateral LE swelling without pitting, decreased ROM, decreased strength, decreased sensation, which limits his ability to perform bed mobility, transfer, stand, and ambulate.

## 2019-12-03 NOTE — PHYSICAL THERAPY INITIAL EVALUATION ADULT - IMPAIRMENTS FOUND, PT EVAL
aerobic capacity/endurance/muscle strength/sensory integrity/ventilation and respiration/gas exchange/Supplemental O2: 3L/min NC/ROM/joint integrity and mobility/gait, locomotion, and balance/ergonomics and body mechanics

## 2019-12-03 NOTE — PHYSICAL THERAPY INITIAL EVALUATION ADULT - PLANNED THERAPY INTERVENTIONS, PT EVAL
transfer training/bed mobility training/strengthening/gait training/stretching/ROM/wheelchair management/propulsion training/balance training/joint mobilization

## 2019-12-03 NOTE — PHYSICAL THERAPY INITIAL EVALUATION ADULT - GENERAL OBSERVATIONS, REHAB EVAL
Pt was received supine with HOB elevated to 30 degrees, NAD, cardiac monitor on, supplemental O2: 3L/min, A&Ox4.

## 2019-12-03 NOTE — CHART NOTE - NSCHARTNOTEFT_GEN_A_CORE
38 y/o morbidly obese male from home PMH ESRD (on MWF HD via LUE AVF at [facility] w/ Dr La), HTN, IDDM, Substance abuse, COPD (former smoker, on home oxygen 2L), Bipolar disorder, and Schizophrenia p/w shortness of breath x 1 day s/p missed HD - HPI limited given patient's moderate work of breathing. Patient states he was suppose to go for HD Friday (schedule was changed 2/2 Thanksgiving week but he was never picked up) and as such developed dyspnea, cough, and work of breathing. In the ED patient seen in acute distress initially placed on standard face mask given but patient refused 2/2 claustrophobia. As per the ED provider patient was not hypoxia. Patient has chronic complaints of pain of his lower extremities - he is actively seeking for a pain management specialist. Patient seen and examined at bedside severely anxious with increased respiratory rate.    Neuro  No acute issues    Cardio  CHFpEF  - ProBNP unchanged, possible diastolic CHF exacerbation 2/2 volume overload, s/op 120 mg Lasix w/ 300 UO  - EKG no acute changes, Troponin negative  HTN  - Hemodynamically stable, c/w Coreg; resumed Hydralazine and imdur today     Resp  Acute respiratory failure 2/2 acute pulmonary edema 2/2 missed HD   - Patient refused nasal BIPAP; C/w NC  - does not have COPD, dc asteroids today   - outpatient PFTs showed restrictive lung disease likely from his obesity  - C/w supplemental oxygen and bronchodilators for possible asthma  -pt also seems to have KRYSTIN given VBG showing chronic elevations CO2  -chronic cocaine use may also be contributing to the bronchospasm    GI  on diet now  pt c/o constipation; will give stool softeners    Renal  ESRD  - S/p HD 12/2 for APE  - Pt still has elevated K and P, sevalamer dose increased and pt started on lokelma, pt also eating outside food which may be contributing  - HD today as per Dr La  - HD as per Nephrology Dr La and monitor BMP    Endo  IDDM prior A1c 5.3  - Goal  -180; outpatient regimen 15 U premeal but prior admission managed w/ ISS alone  - C/w ISS and FSQ6    Heme  - No acute issues    ID   - No acute issues    Psych  Mood disorder, Schizophrenia, and Chronic Pain  - On multiple psychoactive medications at home; C/w Clonazepam, Risperidone, and Mirtazapine    HSQ for DVT PPx    Pt is stable for downgrade to floor as per ICU attending. Case discussed with attending. Attending to discuss case with receiving attending Dr Woodall, case signed to intern on floor Dr Hu.     THINGS TO FOLLOW:  -Hydralazine and Imdur started today, monitor bp.  -pt currently off lasix, reassess and f/u with nephro.   -monitor for BM. 38 y/o morbidly obese male from home PMH ESRD (on MWF HD via LUE AVF at [facility] w/ Dr La), HTN, IDDM, Substance abuse, COPD (former smoker, on home oxygen 2L), Bipolar disorder, and Schizophrenia p/w shortness of breath x 1 day s/p missed HD - HPI limited given patient's moderate work of breathing. Patient states he was suppose to go for HD Friday (schedule was changed 2/2 Thanksgiving week but he was never picked up) and as such developed dyspnea, cough, and work of breathing. In the ED patient seen in acute distress initially placed on standard face mask given but patient refused 2/2 claustrophobia. As per the ED provider patient was not hypoxia. Patient has chronic complaints of pain of his lower extremities - he is actively seeking for a pain management specialist. Patient seen and examined at bedside severely anxious with increased respiratory rate.    Neuro  No acute issues    Cardio  CHFpEF  - ProBNP unchanged, possible diastolic CHF exacerbation 2/2 volume overload, s/op 120 mg Lasix w/ 300 UO  - EKG no acute changes, Troponin negative  HTN  - Hemodynamically stable, c/w Coreg; resumed Hydralazine and imdur today     Resp  Acute respiratory failure 2/2 acute pulmonary edema 2/2 missed HD   - Patient refused nasal BIPAP; C/w NC  - does not have COPD, dc asteroids today   - outpatient PFTs showed restrictive lung disease likely from his obesity  - C/w supplemental oxygen and bronchodilators for possible asthma  -pt also seems to have KRYSTIN given VBG showing chronic elevations CO2  -chronic cocaine use may also be contributing to the bronchospasm    GI  on diet now  pt c/o constipation; will give stool softeners    Renal  ESRD  - S/p HD 12/2 for APE  - Pt still has elevated K and P, sevalamer dose increased and pt started on lokelma, pt also eating outside food which may be contributing  - HD today as per Dr La  - HD as per Nephrology Dr La and monitor BMP    Endo  IDDM prior A1c 5.3  - Goal  -180; outpatient regimen 15 U premeal but prior admission managed w/ ISS alone  - C/w ISS and FSQ6    Heme  - No acute issues    ID   - No acute issues    Psych  Mood disorder, Schizophrenia, and Chronic Pain  - On multiple psychoactive medications at home; C/w Clonazepam, Risperidone, and Mirtazapine    HSQ for DVT PPx    Pt is stable for downgrade to floor as per ICU attending. Case discussed with attending. Attending to discuss case with receiving attending Dr Loomis, case signed to intern on floor Dr Hu.     THINGS TO FOLLOW:  -Hydralazine and Imdur started today, monitor bp.  -pt currently off lasix, reassess and f/u with nephro.   -monitor for BM.

## 2019-12-03 NOTE — PHYSICAL THERAPY INITIAL EVALUATION ADULT - ASR EQUIP NEEDS DISCH PT EVAL
Due to lack of aerobic capacity and functional mobility, Pt may benefit and become more independent from a motorized scooter in order to mobilize.

## 2019-12-03 NOTE — PHYSICAL THERAPY INITIAL EVALUATION ADULT - PERTINENT HX OF CURRENT PROBLEM, REHAB EVAL
Pt is morbidly obese presents to ICU for acute respiratory failure and acute pulmonary edema after having SOB due to missing HD. PMH includes ESRD (HD MWF LUE AVF) HTN, IDDM, substance abuse, COPD, Bipolar disorder, and Schizophrenia Pt is morbidly obese presents to ICU for acute respiratory failure and acute pulmonary edema after having SOB d/t missing HD. PMH includes ESRD (HD MWF LUE AVF) HTN, IDDM, substance abuse, COPD, Bipolar disorder, and Schizophrenia. Pt reports that he had hurt his L ankle and saw a podiatrist who put him in a cast/walking boot, however he took the boot/cast off one week earlier than podiatrist asked for, still has pain. Pt also reports "thinking" he had L knee surgery but cannot recall when/why

## 2019-12-03 NOTE — PHYSICAL THERAPY INITIAL EVALUATION ADULT - SOCIAL CONCERNS
Substance misuse/History of substance abuse, pain medication seeking behavior. Morbid obesity with bipolar/schizophrenia/Complex psychosocial needs/coping issues

## 2019-12-03 NOTE — PHYSICAL THERAPY INITIAL EVALUATION ADULT - MANUAL MUSCLE TESTING RESULTS, REHAB EVAL
MMT BUE grossly: 5/5. MMT LLE grossly: 3-/5 due to pain. RLE MMT grossly: 3+/5/grossly assessed due to

## 2019-12-03 NOTE — PHYSICAL THERAPY INITIAL EVALUATION ADULT - BALANCE DISTURBANCE, IDENTIFIED IMPAIRMENT CONTRIBUTE, REHAB EVAL
pain/decreased sensation/decreased strength impaired postural control/decreased sensation/pain/decreased strength

## 2019-12-03 NOTE — PROGRESS NOTE ADULT - ASSESSMENT
38 y/o morbidly obese male from home PMH ESRD (on MWF HD via LUE AVF at [facility] w/ Dr La), HTN, IDDM, Substance abuse, COPD (former smoker, on home oxygen 2L), Bipolar disorder, and Schizophrenia p/w shortness of breath x 1 day s/p missed HD - admitted to the ICU for acute respiratory failure 2/2 acute pulmonary edema 2/2 missed HD session requiring nasal BIPAP (given clasutrophobia)  ***Primary team to reconcile outpatient medications unclear compliance    Neuro  No acute issues    Cardio  CHFpEF  - ProBNP unchanged, possible diastolic CHF exacerbation 2/2 volume overload, s/op 120 mg Lasix w/ 300 UO  - EKG no acute changes, Troponin negative  HTN  - Hemodynamically stable, c/w Coreg; resumed Hydralazine and imdur today     Resp  Acute respiratory failure 2/2 acute pulmonary edema 2/2 missed HD   - Patient refused nasal BIPAP; C/w NC  - does not have COPD, dc asteroids today   - outpatient PFTs showed restrictive lung disease likely from his obesity  - C/w supplemental oxygen and bronchodilators for possible asthma  -pt also seems to have KRYSTIN given VBG showing chronic elevations CO2  -chronic cocaine use may also be contributing to the bronchospasm    GI  on diet now  pt c/o constipation; will give stool softeners    Renal  ESRD  - S/p HD 12/2 for APE  - Pt still has elevated K and P, sevalamer dose increased and pt started on lokelma, pt also eating outside food which may be contributing  - HD today as per Dr La  - HD as per Nephrology Dr La and monitor BMP    Endo  IDDM prior A1c 5.3  - Goal  -180; outpatient regimen 15 U premeal but prior admission managed w/ ISS alone  - C/w ISS and FSQ6    Heme  - No acute issues    ID   - No acute issues    Psych  Mood disorder, Schizophrenia, and Chronic Pain  - On multiple psychoactive medications at home; C/w Clonazepam, Risperidone, and Mirtazapine    HSQ for DVT PPx 38 y/o morbidly obese male from home PMH ESRD (on MWF HD via LUE AVF at [facility] w/ Dr La), HTN, IDDM, Substance abuse, COPD (former smoker, on home oxygen 2L), Bipolar disorder, and Schizophrenia p/w shortness of breath x 1 day s/p missed HD - admitted to the ICU for acute respiratory failure 2/2 acute pulmonary edema 2/2 missed HD session requiring nasal BIPAP (given clasutrophobia)    Neuro  No acute issues    Cardio  CHFpEF  - ProBNP unchanged, possible diastolic CHF exacerbation 2/2 volume overload, s/op 120 mg Lasix w/ 300 UO  - EKG no acute changes, Troponin negative  HTN  - Hemodynamically stable, c/w Coreg; resumed Hydralazine and imdur today     Resp  Acute respiratory failure 2/2 acute pulmonary edema 2/2 missed HD   - Patient refused nasal BIPAP; C/w NC  - does not have COPD, dc asteroids today   - outpatient PFTs showed restrictive lung disease likely from his obesity  - C/w supplemental oxygen and bronchodilators for possible asthma  -pt also seems to have KRYSTIN given VBG showing chronic elevations CO2  -chronic cocaine use may also be contributing to the bronchospasm    GI  on diet now  pt c/o constipation; will give stool softeners    Renal  ESRD  - S/p HD 12/2 for APE  - Pt still has elevated K and P, sevalamer dose increased and pt started on lokelma, pt also eating outside food which may be contributing  - HD today as per Dr La  - HD as per Nephrology Dr La and monitor BMP    Endo  IDDM prior A1c 5.3  - Goal  -180; outpatient regimen 15 U premeal but prior admission managed w/ ISS alone  - C/w ISS and FSQ6    Heme  - No acute issues    ID   - No acute issues    Psych  Mood disorder, Schizophrenia, and Chronic Pain  - On multiple psychoactive medications at home; C/w Clonazepam, Risperidone, and Mirtazapine    HSQ for DVT PPx

## 2019-12-04 ENCOUNTER — TRANSCRIPTION ENCOUNTER (OUTPATIENT)
Age: 39
End: 2019-12-04

## 2019-12-04 DIAGNOSIS — E66.01 MORBID (SEVERE) OBESITY DUE TO EXCESS CALORIES: ICD-10-CM

## 2019-12-04 DIAGNOSIS — Z29.9 ENCOUNTER FOR PROPHYLACTIC MEASURES, UNSPECIFIED: ICD-10-CM

## 2019-12-04 DIAGNOSIS — J81.1 CHRONIC PULMONARY EDEMA: ICD-10-CM

## 2019-12-04 DIAGNOSIS — E11.9 TYPE 2 DIABETES MELLITUS WITHOUT COMPLICATIONS: ICD-10-CM

## 2019-12-04 DIAGNOSIS — N18.6 END STAGE RENAL DISEASE: ICD-10-CM

## 2019-12-04 DIAGNOSIS — I10 ESSENTIAL (PRIMARY) HYPERTENSION: ICD-10-CM

## 2019-12-04 LAB
ALBUMIN SERPL ELPH-MCNC: 2.5 G/DL — LOW (ref 3.5–5)
ALP SERPL-CCNC: 210 U/L — HIGH (ref 40–120)
ALT FLD-CCNC: 16 U/L DA — SIGNIFICANT CHANGE UP (ref 10–60)
ANION GAP SERPL CALC-SCNC: 11 MMOL/L — SIGNIFICANT CHANGE UP (ref 5–17)
AST SERPL-CCNC: 7 U/L — LOW (ref 10–40)
BILIRUB SERPL-MCNC: 0.5 MG/DL — SIGNIFICANT CHANGE UP (ref 0.2–1.2)
BUN SERPL-MCNC: 78 MG/DL — HIGH (ref 7–18)
CALCIUM SERPL-MCNC: 8.6 MG/DL — SIGNIFICANT CHANGE UP (ref 8.4–10.5)
CHLORIDE SERPL-SCNC: 93 MMOL/L — LOW (ref 96–108)
CO2 SERPL-SCNC: 29 MMOL/L — SIGNIFICANT CHANGE UP (ref 22–31)
CREAT SERPL-MCNC: 7.64 MG/DL — HIGH (ref 0.5–1.3)
GLUCOSE BLDC GLUCOMTR-MCNC: 130 MG/DL — HIGH (ref 70–99)
GLUCOSE BLDC GLUCOMTR-MCNC: 151 MG/DL — HIGH (ref 70–99)
GLUCOSE BLDC GLUCOMTR-MCNC: 156 MG/DL — HIGH (ref 70–99)
GLUCOSE SERPL-MCNC: 168 MG/DL — HIGH (ref 70–99)
HCT VFR BLD CALC: 31.2 % — LOW (ref 39–50)
HGB BLD-MCNC: 8.9 G/DL — LOW (ref 13–17)
MAGNESIUM SERPL-MCNC: 2.4 MG/DL — SIGNIFICANT CHANGE UP (ref 1.6–2.6)
MCHC RBC-ENTMCNC: 27.1 PG — SIGNIFICANT CHANGE UP (ref 27–34)
MCHC RBC-ENTMCNC: 28.5 GM/DL — LOW (ref 32–36)
MCV RBC AUTO: 95.1 FL — SIGNIFICANT CHANGE UP (ref 80–100)
NRBC # BLD: 0 /100 WBCS — SIGNIFICANT CHANGE UP (ref 0–0)
PHOSPHATE SERPL-MCNC: 7.6 MG/DL — HIGH (ref 2.5–4.5)
PLATELET # BLD AUTO: 301 K/UL — SIGNIFICANT CHANGE UP (ref 150–400)
POTASSIUM SERPL-MCNC: 4.6 MMOL/L — SIGNIFICANT CHANGE UP (ref 3.5–5.3)
POTASSIUM SERPL-SCNC: 4.6 MMOL/L — SIGNIFICANT CHANGE UP (ref 3.5–5.3)
PROT SERPL-MCNC: 7.4 G/DL — SIGNIFICANT CHANGE UP (ref 6–8.3)
RBC # BLD: 3.28 M/UL — LOW (ref 4.2–5.8)
RBC # FLD: 18.4 % — HIGH (ref 10.3–14.5)
SODIUM SERPL-SCNC: 133 MMOL/L — LOW (ref 135–145)
WBC # BLD: 9.29 K/UL — SIGNIFICANT CHANGE UP (ref 3.8–10.5)
WBC # FLD AUTO: 9.29 K/UL — SIGNIFICANT CHANGE UP (ref 3.8–10.5)

## 2019-12-04 PROCEDURE — 99223 1ST HOSP IP/OBS HIGH 75: CPT

## 2019-12-04 PROCEDURE — 74176 CT ABD & PELVIS W/O CONTRAST: CPT | Mod: 26

## 2019-12-04 PROCEDURE — 99233 SBSQ HOSP IP/OBS HIGH 50: CPT | Mod: GC

## 2019-12-04 RX ORDER — INSULIN LISPRO 100/ML
VIAL (ML) SUBCUTANEOUS
Refills: 0 | Status: DISCONTINUED | OUTPATIENT
Start: 2019-12-04 | End: 2019-12-13

## 2019-12-04 RX ORDER — ACETAMINOPHEN 500 MG
1000 TABLET ORAL ONCE
Refills: 0 | Status: COMPLETED | OUTPATIENT
Start: 2019-12-04 | End: 2019-12-04

## 2019-12-04 RX ORDER — INSULIN ASPART 100 [IU]/ML
15 INJECTION, SOLUTION SUBCUTANEOUS
Qty: 0 | Refills: 0 | DISCHARGE

## 2019-12-04 RX ORDER — HYDROMORPHONE HYDROCHLORIDE 2 MG/ML
0.5 INJECTION INTRAMUSCULAR; INTRAVENOUS; SUBCUTANEOUS ONCE
Refills: 0 | Status: DISCONTINUED | OUTPATIENT
Start: 2019-12-04 | End: 2019-12-04

## 2019-12-04 RX ORDER — OXYCODONE AND ACETAMINOPHEN 5; 325 MG/1; MG/1
1 TABLET ORAL ONCE
Refills: 0 | Status: DISCONTINUED | OUTPATIENT
Start: 2019-12-04 | End: 2019-12-04

## 2019-12-04 RX ADMIN — SEVELAMER CARBONATE 1600 MILLIGRAM(S): 2400 POWDER, FOR SUSPENSION ORAL at 18:42

## 2019-12-04 RX ADMIN — SODIUM ZIRCONIUM CYCLOSILICATE 5 GRAM(S): 10 POWDER, FOR SUSPENSION ORAL at 13:17

## 2019-12-04 RX ADMIN — Medication 25 MILLIGRAM(S): at 21:21

## 2019-12-04 RX ADMIN — Medication 1: at 06:58

## 2019-12-04 RX ADMIN — HEPARIN SODIUM 5000 UNIT(S): 5000 INJECTION INTRAVENOUS; SUBCUTANEOUS at 05:53

## 2019-12-04 RX ADMIN — Medication 0.5 MILLIGRAM(S): at 21:21

## 2019-12-04 RX ADMIN — OXYCODONE AND ACETAMINOPHEN 1 TABLET(S): 5; 325 TABLET ORAL at 16:31

## 2019-12-04 RX ADMIN — CLOPIDOGREL BISULFATE 75 MILLIGRAM(S): 75 TABLET, FILM COATED ORAL at 12:03

## 2019-12-04 RX ADMIN — RISPERIDONE 1 MILLIGRAM(S): 4 TABLET ORAL at 18:42

## 2019-12-04 RX ADMIN — Medication 1: at 21:22

## 2019-12-04 RX ADMIN — Medication 3 MILLILITER(S): at 14:30

## 2019-12-04 RX ADMIN — HYDROMORPHONE HYDROCHLORIDE 0.5 MILLIGRAM(S): 2 INJECTION INTRAMUSCULAR; INTRAVENOUS; SUBCUTANEOUS at 06:58

## 2019-12-04 RX ADMIN — Medication 25 MILLIGRAM(S): at 14:48

## 2019-12-04 RX ADMIN — SENNA PLUS 2 TABLET(S): 8.6 TABLET ORAL at 21:21

## 2019-12-04 RX ADMIN — HEPARIN SODIUM 5000 UNIT(S): 5000 INJECTION INTRAVENOUS; SUBCUTANEOUS at 13:16

## 2019-12-04 RX ADMIN — Medication 100 MILLIGRAM(S): at 02:25

## 2019-12-04 RX ADMIN — Medication 3 MILLILITER(S): at 02:05

## 2019-12-04 RX ADMIN — SEVELAMER CARBONATE 1600 MILLIGRAM(S): 2400 POWDER, FOR SUSPENSION ORAL at 13:16

## 2019-12-04 RX ADMIN — OXYCODONE AND ACETAMINOPHEN 1 TABLET(S): 5; 325 TABLET ORAL at 00:10

## 2019-12-04 RX ADMIN — SEVELAMER CARBONATE 1600 MILLIGRAM(S): 2400 POWDER, FOR SUSPENSION ORAL at 08:43

## 2019-12-04 RX ADMIN — Medication 3 MILLILITER(S): at 08:09

## 2019-12-04 RX ADMIN — OXYCODONE AND ACETAMINOPHEN 1 TABLET(S): 5; 325 TABLET ORAL at 13:18

## 2019-12-04 RX ADMIN — HEPARIN SODIUM 5000 UNIT(S): 5000 INJECTION INTRAVENOUS; SUBCUTANEOUS at 21:22

## 2019-12-04 RX ADMIN — OXYCODONE AND ACETAMINOPHEN 1 TABLET(S): 5; 325 TABLET ORAL at 07:02

## 2019-12-04 RX ADMIN — ISOSORBIDE MONONITRATE 30 MILLIGRAM(S): 60 TABLET, EXTENDED RELEASE ORAL at 14:47

## 2019-12-04 RX ADMIN — HYDROMORPHONE HYDROCHLORIDE 0.5 MILLIGRAM(S): 2 INJECTION INTRAMUSCULAR; INTRAVENOUS; SUBCUTANEOUS at 07:44

## 2019-12-04 RX ADMIN — OXYCODONE AND ACETAMINOPHEN 1 TABLET(S): 5; 325 TABLET ORAL at 12:02

## 2019-12-04 RX ADMIN — OXYCODONE AND ACETAMINOPHEN 1 TABLET(S): 5; 325 TABLET ORAL at 05:53

## 2019-12-04 RX ADMIN — MIRTAZAPINE 7.5 MILLIGRAM(S): 45 TABLET, ORALLY DISINTEGRATING ORAL at 13:16

## 2019-12-04 RX ADMIN — METHOCARBAMOL 500 MILLIGRAM(S): 500 TABLET, FILM COATED ORAL at 05:53

## 2019-12-04 RX ADMIN — Medication 100 MILLIGRAM(S): at 20:02

## 2019-12-04 RX ADMIN — Medication 3 MILLILITER(S): at 21:22

## 2019-12-04 RX ADMIN — METHOCARBAMOL 500 MILLIGRAM(S): 500 TABLET, FILM COATED ORAL at 18:42

## 2019-12-04 RX ADMIN — RISPERIDONE 1 MILLIGRAM(S): 4 TABLET ORAL at 05:53

## 2019-12-04 RX ADMIN — Medication 2: at 00:19

## 2019-12-04 RX ADMIN — OXYCODONE AND ACETAMINOPHEN 1 TABLET(S): 5; 325 TABLET ORAL at 18:02

## 2019-12-04 RX ADMIN — SIMVASTATIN 40 MILLIGRAM(S): 20 TABLET, FILM COATED ORAL at 21:21

## 2019-12-04 NOTE — PROGRESS NOTE ADULT - ASSESSMENT
40 y/o morbidly obese male from home PMH ESRD (on MWF HD via LUE AVF at [facility] w/ Dr La), HTN, IDDM, Substance abuse, COPD (former smoker, on home oxygen 2L), Bipolar disorder, and Schizophrenia p/w shortness of breath x 1 day s/p missed HD - admitted to the ICU for acute respiratory failure 2/2 acute pulmonary edema 2/2 missed HD session requiring nasal BIPAP (given clasutrophobia)

## 2019-12-04 NOTE — DISCHARGE NOTE PROVIDER - NSDCMRMEDTOKEN_GEN_ALL_CORE_FT
carvedilol 6.25 mg oral tablet: 1 tab(s) orally every 12 hours  clonazePAM 0.5 mg oral tablet: 1 tab(s) orally every 12 hours MDD:2 tabs per day  epoetin cyndie: 4000 unit(s) intravenously Monday, Wednesday, and Friday in HD  FeroSul 325 mg (65 mg elemental iron) oral tablet: 1 tab(s) orally 2 times a day x 30 days   folic acid 1 mg oral tablet: 1 tab(s) orally once a day  gabapentin 300 mg oral capsule: 1 cap(s) orally once a day (at bedtime)   hydrALAZINE 50 mg oral tablet: 1 tab(s) orally 3 times a day  ipratropium-albuterol 0.5 mg-2.5 mg/3 mLinhalation solution: 3 milliliter(s) inhaled every 8 hours   isosorbide mononitrate 30 mg oral tablet, extended release: 1 tab(s) orally once a day  Lasix 80 mg oral tablet: 1 tab(s) orally once a day  methocarbamol 500 mg oral tablet: 1 tab(s) orally every 12 hours, As needed, spasms  mirtazapine 7.5 mg oral tablet: 1 tab(s) orally once a day (at bedtime)  pantoprazole 40 mg oral delayed release tablet: 1 tab(s) orally once a day (before a meal)  PARoxetine 20 mg oral tablet: 1 tab(s) orally once a day (at bedtime)  Percocet 5/325 oral tablet: 1 tab(s) orally every 8 hours MDD:mdd 3 tab  Plavix 75 mg oral tablet: 1 tab(s) orally once a day  risperiDONE 1 mg oral tablet: 1 tab(s) orally every 12 hours  sevelamer carbonate 800 mg oral tablet: 1 tab(s) orally 3 times a day (with meals)  simvastatin 40 mg oral tablet: 1 tab(s) orally once a day (at bedtime)  traMADol 50 mg oral tablet: 0.5 tab(s) orally every 8 hours, As needed, Severe Pain (7 - 10)  Vitamin C 500 mg oral tablet: 1 tab(s) orally once a day carvedilol 6.25 mg oral tablet: 1 tab(s) orally every 12 hours  clonazePAM 0.5 mg oral tablet: 1 tab(s) orally every 12 hours MDD:2 tabs per day  epoetin cyndie: 4000 unit(s) intravenously Monday, Wednesday, and Friday in   FeroSul 325 mg (65 mg elemental iron) oral tablet: 1 tab(s) orally 2 times a day x 30 days   folic acid 1 mg oral tablet: 1 tab(s) orally once a day  gabapentin 300 mg oral capsule: 1 cap(s) orally once a day (at bedtime)   hydrALAZINE 50 mg oral tablet: 1 tab(s) orally 3 times a day  ipratropium-albuterol 0.5 mg-2.5 mg/3 mLinhalation solution: 3 milliliter(s) inhaled every 8 hours   isosorbide mononitrate 30 mg oral tablet, extended release: 1 tab(s) orally once a day  Lasix 80 mg oral tablet: 1 tab(s) orally once a day  methocarbamol 500 mg oral tablet: 1 tab(s) orally every 12 hours, As needed, spasms  mirtazapine 7.5 mg oral tablet: 1 tab(s) orally once a day (at bedtime)  pantoprazole 40 mg oral delayed release tablet: 1 tab(s) orally once a day (before a meal)  PARoxetine 20 mg oral tablet: 1 tab(s) orally once a day (at bedtime)  Plavix 75 mg oral tablet: 1 tab(s) orally once a day  risperiDONE 1 mg oral tablet: 1 tab(s) orally every 12 hours  sevelamer carbonate 800 mg oral tablet: 1 tab(s) orally 3 times a day (with meals)  simvastatin 40 mg oral tablet: 1 tab(s) orally once a day (at bedtime)  traMADol 50 mg oral tablet: 0.5 tab(s) orally every 8 hours, As needed, Severe Pain (7 - 10)  Vitamin C 500 mg oral tablet: 1 tab(s) orally once a day carvedilol 6.25 mg oral tablet: 1 tab(s) orally every 12 hours  cefuroxime 250 mg oral tablet: 1 tab(s) orally 2 times a day   clonazePAM 0.5 mg oral tablet: 1 tab(s) orally every 12 hours MDD:2 tabs per day  epoetin cyndie: 4000 unit(s) intravenously Monday, Wednesday, and Friday in HD  FeroSul 325 mg (65 mg elemental iron) oral tablet: 1 tab(s) orally 2 times a day x 30 days   folic acid 1 mg oral tablet: 1 tab(s) orally once a day  gabapentin 300 mg oral capsule: 1 cap(s) orally once a day (at bedtime)   hydrALAZINE 50 mg oral tablet: 1 tab(s) orally 3 times a day  ipratropium-albuterol 0.5 mg-2.5 mg/3 mLinhalation solution: 3 milliliter(s) inhaled every 8 hours   isosorbide mononitrate 30 mg oral tablet, extended release: 1 tab(s) orally once a day  Lasix 80 mg oral tablet: 1 tab(s) orally once a day  methocarbamol 500 mg oral tablet: 1 tab(s) orally every 12 hours, As needed, spasms  mirtazapine 7.5 mg oral tablet: 1 tab(s) orally once a day (at bedtime)  pantoprazole 40 mg oral delayed release tablet: 1 tab(s) orally once a day (before a meal)  PARoxetine 20 mg oral tablet: 1 tab(s) orally once a day (at bedtime)  PhosLo 667 mg oral capsule: 1 cap(s) orally 3 times a day   Plavix 75 mg oral tablet: 1 tab(s) orally once a day  Procrit 10,000 units/mL preservative-free injectable solution: 47333 unit(s) intravenously Monday, Wednesday, and Friday . during dialysis  Renvela 800 mg oral tablet: 2 tab(s) orally 3 times a day   risperiDONE 1 mg oral tablet: 1 tab(s) orally every 12 hours  sevelamer carbonate 800 mg oral tablet: 1 tab(s) orally 3 times a day (with meals)  simvastatin 40 mg oral tablet: 1 tab(s) orally once a day (at bedtime)  traMADol 50 mg oral tablet: 0.5 tab(s) orally every 8 hours, As needed, Severe Pain (7 - 10)  Vitamin C 500 mg oral tablet: 1 tab(s) orally once a day carvedilol 6.25 mg oral tablet: 1 tab(s) orally every 12 hours  cefuroxime 250 mg oral tablet: 1 tab(s) orally 2 times a day   clonazePAM 0.5 mg oral tablet: 1 tab(s) orally every 12 hours MDD:2 tabs per day  epoetin cyndie: 4000 unit(s) intravenously Monday, Wednesday, and Friday in HD  FeroSul 325 mg (65 mg elemental iron) oral tablet: 1 tab(s) orally 2 times a day x 30 days   folic acid 1 mg oral tablet: 1 tab(s) orally once a day  gabapentin 300 mg oral capsule: 1 cap(s) orally once a day (at bedtime)   hydrALAZINE 50 mg oral tablet: 1 tab(s) orally 3 times a day  ipratropium-albuterol 0.5 mg-2.5 mg/3 mLinhalation solution: 3 milliliter(s) inhaled every 8 hours   isosorbide mononitrate 30 mg oral tablet, extended release: 1 tab(s) orally once a day  Lasix 80 mg oral tablet: 1 tab(s) orally once a day  methocarbamol 500 mg oral tablet: 1 tab(s) orally every 12 hours, As needed, spasms  mirtazapine 7.5 mg oral tablet: 1 tab(s) orally once a day (at bedtime)  pantoprazole 40 mg oral delayed release tablet: 1 tab(s) orally once a day (before a meal)  PARoxetine 20 mg oral tablet: 1 tab(s) orally once a day (at bedtime)  PhosLo 667 mg oral capsule: 1 cap(s) orally 3 times a day   Plavix 75 mg oral tablet: 1 tab(s) orally once a day  Procrit 10,000 units/mL preservative-free injectable solution: 06711 unit(s) intravenously Monday, Wednesday, and Friday . during dialysis  Renvela 800 mg oral tablet: 2 tab(s) orally 3 times a day   risperiDONE 1 mg oral tablet: 1 tab(s) orally every 12 hours  sevelamer carbonate 800 mg oral tablet: 1 tab(s) orally 3 times a day (with meals)  simvastatin 40 mg oral tablet: 1 tab(s) orally once a day (at bedtime)  traMADol 50 mg oral tablet: 0.5 tab(s) orally every 8 hours, As needed, Severe Pain (7 - 10)  vancomycin 500 mg intravenous injection: 500 milligram(s) intravenous Monday, Wednesday, and Friday , during dialysis.  Vitamin C 500 mg oral tablet: 1 tab(s) orally once a day carvedilol 6.25 mg oral tablet: 1 tab(s) orally every 12 hours  cefuroxime 250 mg oral tablet: 1 tab(s) orally 2 times a day   clonazePAM 0.5 mg oral tablet: 1 tab(s) orally every 12 hours MDD:2 tabs per day  epoetin cyndie: 4000 unit(s) intravenously Monday, Wednesday, and Friday in HD  FeroSul 325 mg (65 mg elemental iron) oral tablet: 1 tab(s) orally 2 times a day x 30 days   folic acid 1 mg oral tablet: 1 tab(s) orally once a day  gabapentin 300 mg oral capsule: 1 cap(s) orally once a day (at bedtime)   hydrALAZINE 50 mg oral tablet: 1 tab(s) orally 3 times a day  ipratropium-albuterol 0.5 mg-2.5 mg/3 mLinhalation solution: 3 milliliter(s) inhaled every 8 hours   isosorbide mononitrate 30 mg oral tablet, extended release: 1 tab(s) orally once a day  Lasix 80 mg oral tablet: 1 tab(s) orally once a day  methocarbamol 500 mg oral tablet: 1 tab(s) orally every 12 hours, As needed, spasms  mirtazapine 7.5 mg oral tablet: 1 tab(s) orally once a day (at bedtime)  pantoprazole 40 mg oral delayed release tablet: 1 tab(s) orally once a day (before a meal)  PARoxetine 20 mg oral tablet: 1 tab(s) orally once a day (at bedtime)  PhosLo 667 mg oral capsule: 1 cap(s) orally 3 times a day   Plavix 75 mg oral tablet: 1 tab(s) orally once a day  Procrit 10,000 units/mL preservative-free injectable solution: 42843 unit(s) intravenously Monday, Wednesday, and Friday . during dialysis  Renvela 800 mg oral tablet: 2 tab(s) orally 3 times a day   risperiDONE 1 mg oral tablet: 1 tab(s) orally every 12 hours  sevelamer carbonate 800 mg oral tablet: 1 tab(s) orally 3 times a day (with meals)  simvastatin 40 mg oral tablet: 1 tab(s) orally once a day (at bedtime)  traMADol 50 mg oral tablet: 0.5 tab(s) orally every 8 hours, As needed, Severe Pain (7 - 10)  vancomycin 500 mg intravenous injection: 500 milligram(s) intravenous on 12/16, 12/18 intra dialysis  Vitamin C 500 mg oral tablet: 1 tab(s) orally once a day carvedilol 6.25 mg oral tablet: 1 tab(s) orally every 12 hours  cefuroxime 250 mg oral tablet: 1 tab(s) orally 2 times a day   clonazePAM 0.5 mg oral tablet: 1 tab(s) orally every 12 hours MDD:2 tabs per day  epoetin cyndie: 4000 unit(s) intravenously Monday, Wednesday, and Friday in HD  FeroSul 325 mg (65 mg elemental iron) oral tablet: 1 tab(s) orally 2 times a day x 30 days   folic acid 1 mg oral tablet: 1 tab(s) orally once a day  gabapentin 300 mg oral capsule: 1 cap(s) orally once a day (at bedtime)   hydrALAZINE 50 mg oral tablet: 1 tab(s) orally 3 times a day  ipratropium-albuterol 0.5 mg-2.5 mg/3 mLinhalation solution: 3 milliliter(s) inhaled every 8 hours   isosorbide mononitrate 30 mg oral tablet, extended release: 1 tab(s) orally once a day  Lasix 80 mg oral tablet: 1 tab(s) orally once a day  methocarbamol 500 mg oral tablet: 1 tab(s) orally every 12 hours, As needed, spasms  mirtazapine 7.5 mg oral tablet: 1 tab(s) orally once a day (at bedtime)  pantoprazole 40 mg oral delayed release tablet: 1 tab(s) orally once a day (before a meal)  PARoxetine 20 mg oral tablet: 1 tab(s) orally once a day (at bedtime)  PhosLo 667 mg oral capsule: 1 cap(s) orally 3 times a day   Plavix 75 mg oral tablet: 1 tab(s) orally once a day  Procrit 10,000 units/mL preservative-free injectable solution: 20088 unit(s) intravenously Monday, Wednesday, and Friday . during dialysis  Renvela 800 mg oral tablet: 2 tab(s) orally 3 times a day   risperiDONE 1 mg oral tablet: 1 tab(s) orally every 12 hours  sevelamer carbonate 800 mg oral tablet: 1 tab(s) orally 3 times a day (with meals)  simvastatin 40 mg oral tablet: 1 tab(s) orally once a day (at bedtime)  traMADol 50 mg oral tablet: 0.5 tab(s) orally every 8 hours, As needed, Severe Pain (7 - 10)  vancomycin 500 mg intravenous injection: 500 milligram(s) intravenous on 12/16, 12/18 intra dialysis  Vitamin C 500 mg oral tablet: 1 tab(s) orally once a day

## 2019-12-04 NOTE — PROGRESS NOTE ADULT - SUBJECTIVE AND OBJECTIVE BOX
PGY 1 Note discussed with supervising resident and primary attending    Patient is a 39y old  Male who presents with a chief complaint of acute pulmonary edema (04 Dec 2019 11:12)      INTERVAL HPI/OVERNIGHT EVENTS:  Patient was seen and examined at bedside, was complaing of abdominal pain, patient has opioid seeking behaviour. we avoid giving him opioids.  MEDICATIONS  (STANDING):  albuterol/ipratropium for Nebulization 3 milliLiter(s) Nebulizer every 6 hours  carvedilol 6.25 milliGRAM(s) Oral every 12 hours  chlorhexidine 4% Liquid 1 Application(s) Topical daily  clopidogrel Tablet 75 milliGRAM(s) Oral daily  epoetin cyndie Injectable 02644 Unit(s) IV Push <User Schedule>  heparin  Injectable 5000 Unit(s) SubCutaneous every 8 hours  hydrALAZINE 25 milliGRAM(s) Oral three times a day  insulin lispro (HumaLOG) corrective regimen sliding scale   SubCutaneous every 6 hours  isosorbide   mononitrate ER Tablet (IMDUR) 30 milliGRAM(s) Oral daily  methocarbamol 500 milliGRAM(s) Oral two times a day  mirtazapine 7.5 milliGRAM(s) Oral daily  polyethylene glycol 3350 17 Gram(s) Oral every 12 hours  risperiDONE   Tablet 1 milliGRAM(s) Oral every 12 hours  senna 2 Tablet(s) Oral at bedtime  sevelamer carbonate 1600 milliGRAM(s) Oral three times a day with meals  simvastatin 40 milliGRAM(s) Oral at bedtime  sodium zirconium cyclosilicate 5 Gram(s) Oral daily    MEDICATIONS  (PRN):  clonazePAM  Tablet 0.5 milliGRAM(s) Oral every 12 hours PRN Anxiety  guaiFENesin    Syrup 100 milliGRAM(s) Oral every 6 hours PRN Cough  oxycodone    5 mG/acetaminophen 325 mG 1 Tablet(s) Oral every 6 hours PRN Moderate Pain (4 - 6)      __________________________________________________  REVIEW OF SYSTEMS:    CONSTITUTIONAL: No fever,   EYES: no acute visual disturbances  NECK: No pain or stiffness  RESPIRATORY: No cough; No shortness of breath  CARDIOVASCULAR: No chest pain, no palpitations  GASTROINTESTINAL: No pain. No nausea or vomiting; No diarrhea   NEUROLOGICAL: No headache or numbness, no tremors  MUSCULOSKELETAL: No joint pain, no muscle pain  GENITOURINARY: no dysuria, no frequency, no hesitancy  PSYCHIATRY: no depression , no anxiety  ALL OTHER  ROS negative        Vital Signs Last 24 Hrs  T(C): 36.7 (04 Dec 2019 05:00), Max: 37.1 (03 Dec 2019 18:43)  T(F): 98 (04 Dec 2019 05:00), Max: 98.8 (03 Dec 2019 18:43)  HR: 72 (04 Dec 2019 08:10) (67 - 84)  BP: 110/54 (04 Dec 2019 05:00) (110/54 - 148/77)  BP(mean): 70 (03 Dec 2019 13:38) (70 - 92)  RR: 17 (04 Dec 2019 05:00) (15 - 20)  SpO2: 100% (04 Dec 2019 08:10) (97% - 100%)    ________________________________________________  PHYSICAL EXAM:  GENERAL: morbidly obese male  HEENT: Normocephalic;  conjunctivae and sclerae clear; moist mucous membranes;   NECK : supple  CHEST/LUNG: Clear to auscultation bilaterally with good air entry   HEART: S1 S2  regular; no murmurs, gallops or rubs  ABDOMEN: Soft, Nontender, Nondistended; Bowel sounds present  EXTREMITIES: no cyanosis; lower extremity edema;   SKIN: warm and dry; no rash  NERVOUS SYSTEM:  Awake and alert; Oriented  to place, person and time ; no new deficits    _________________________________________________  LABS:                        9.2    9.32  )-----------( 289      ( 03 Dec 2019 06:43 )             32.6     12-03    129<L>  |  94<L>  |  75<H>  ----------------------------<  240<H>  5.9<H>   |  24  |  8.17<H>    Ca    8.6      03 Dec 2019 06:43  Phos  7.9     12-03  Mg     2.6     12-03    TPro  7.8  /  Alb  2.6<L>  /  TBili  0.5  /  DBili  x   /  AST  6<L>  /  ALT  15  /  AlkPhos  233<H>  12-03        CAPILLARY BLOOD GLUCOSE      POCT Blood Glucose.: 130 mg/dL (04 Dec 2019 11:50)  POCT Blood Glucose.: 186 mg/dL (04 Dec 2019 06:28)  POCT Blood Glucose.: 228 mg/dL (04 Dec 2019 00:11)  POCT Blood Glucose.: 119 mg/dL (03 Dec 2019 18:38)  POCT Blood Glucose.: 165 mg/dL (03 Dec 2019 16:02)  POCT Blood Glucose.: 246 mg/dL (03 Dec 2019 13:49)        RADIOLOGY & ADDITIONAL TESTS:    Imaging Personally Reviewed:  YES/NO    Consultant(s) Notes Reviewed:   YES/ No    Care Discussed with Consultants :     Plan of care was discussed with patient and /or primary care giver; all questions and concerns were addressed and care was aligned with patient's wishes.

## 2019-12-04 NOTE — DISCHARGE NOTE PROVIDER - HOSPITAL COURSE
40 y/o morbidly obese male from home PMH ESRD (on MWF HD via LUE [facility]F at [facility] w/ Dr La), HTN, IDDM, Substance abuse, COPD (former smoker, on home oxygen 2L), Bipolar disorder, and Schizophrenia p/w shortness of breath x 1 day s/p missed HD - HPI limited given patient's moderate work of breathing. Patient states he was suppose to go for HD Friday (schedule was changed 2/2 Thanksgiving week but he was never picked up) and as such developed dyspnea, cough, and work of breathing. In the ED patient seen in acute distress initially placed on standard face mask given but patient refused 2/2 claustrophobia. As per the ED provider patient was not hypoxia. Patient has chronic complaints of pain of his lower extremities - he is actively seeking for a pain management specialist. Patient seen and examined at bedside severely anxious with increased respiratory rate.        Patient was admitted to ICU because of acute Hypoxic respiratory failure, he refused BIPAP, currently he is saturating fine on nasal cannula.    PAtient has ESRD , he got his dialysis as scheduled. Dr La was nephrologist.             Patient is medically stable for discharge , Case is discussed with attending. 38 y/o morbidly obese male from home PMH ESRD (on MWF HD via L[facility]F at [facility] w/ Dr La), HTN, IDDM, Substance abuse, COPD (former smoker, on home oxygen 2L), Bipolar disorder, and Schizophrenia p/w shortness of breath x 1 day s/p missed HD - HPI limited given patient's moderate work of breathing. Patient states he was suppose to go for HD Friday (schedule was changed 2/2 Thanksgiving week but he was never picked up) and as such developed dyspnea, cough, and work of breathing. In the ED patient seen in acute distress initially placed on standard face mask given but patient refused 2/2 claustrophobia. As per the ED provider patient was not hypoxia. Patient has chronic complaints of pain of his lower extremities - he is actively seeking for a pain management specialist. Patient seen and examined at bedside severely anxious with increased respiratory rate.        Patient was admitted to ICU because of acute Hypoxic respiratory failure, he refused BIPAP, currently he is saturating fine on nasal cannula.    Patient has ESRD , he got his dialysis as scheduled. Dr La was nephrologist.             Patient is medically stable for discharge , Case is discussed with attending. 40 y/o morbidly obese male from home PMH ESRD (on MWF HD via LUE AVF at [facility] w/ Dr La), HTN, IDDM, Substance abuse, COPD (former smoker, on home oxygen 2L), Bipolar disorder, and Schizophrenia p/w shortness of breath x 1 day s/p missed HD - HPI limited given patient's moderate work of breathing. Patient states he was suppose to go for HD Friday (schedule was changed 2/2 Thanksgiving week but he was never picked up) and as such developed dyspnea, cough, and work of breathing. In the ED patient seen in acute distress initially placed on standard face mask given but patient refused 2/2 claustrophobia. As per the ED provider patient was not hypoxia. Patient has chronic complaints of pain of his lower extremities - he is actively seeking for a pain management specialist. Patient seen and examined at bedside severely anxious with increased respiratory rate.        Patient was admitted to ICU because of acute Hypoxic respiratory failure, he refused BIPAP, currently he is saturating fine on nasal cannula. dionne he was down graded.    Patient has ESRD , he got his dialysis as scheduled. Dr La was nephrologist.        PT recommended GREER for him.    Patient spiked temp of 100.8  and 101, blood cx were sent twice they were negative. UA was positive so he was treated with rocephin.             Patient is medically stable for discharge , Case is discussed with attending. 40 y/o morbidly obese male from home PMH ESRD (on MWF HD via L[facility]F at [facility] w/ Dr La), HTN, IDDM, Substance abuse, COPD (former smoker, on home oxygen 2L), Bipolar disorder, and Schizophrenia p/w shortness of breath x 1 day s/p missed HD - HPI limited given patient's moderate work of breathing. Patient states he was suppose to go for HD Friday (schedule was changed 2/2 Thanksgiving week but he was never picked up) and as such developed dyspnea, cough, and work of breathing. In the ED patient seen in acute distress initially placed on standard face mask given but patient refused 2/2 claustrophobia. As per the ED provider patient was not hypoxia. Patient has chronic complaints of pain of his lower extremities - he is actively seeking for a pain management specialist. Patient seen and examined at bedside severely anxious with increased respiratory rate.        Patient was admitted to ICU because of acute Hypoxic respiratory failure, he refused BIPAP, currently he is saturating fine on nasal cannula. dionne he was down graded.    Patient has ESRD , he got his dialysis as scheduled. Dr La was nephrologist.        PT recommended GREER for him.    Patient spiked temp of 100.8  and 101, blood cx were sent twice they were negative. UA was positive so he was treated with rocephin.    PAtient also got vancomycin for possible cellulitis. he is being discharged on vancomycin every alternate days for 4 more days.    Patient is medically stable for discharge , Case is discussed with attending. 40 y/o morbidly obese male from home PMH ESRD (on MWF HD via LUE [facility]F at [facility] w/ Dr La), HTN, IDDM, Substance abuse, COPD (former smoker, on home oxygen 2L), Bipolar disorder, and Schizophrenia p/w shortness of breath x 1 day s/p missed HD - HPI limited given patient's moderate work of breathing. Patient states he was suppose to go for HD Friday (schedule was changed 2/2 Thanksgiving week but he was never picked up) and as such developed dyspnea, cough, and work of breathing. In the ED patient seen in acute distress initially placed on standard face mask given but patient refused 2/2 claustrophobia. As per the ED provider patient was not hypoxia. Patient has chronic complaints of pain of his lower extremities - he is actively seeking for a pain management specialist. Patient seen and examined at bedside severely anxious with increased respiratory rate.        Patient was admitted to ICU because of acute Hypoxic respiratory failure, he refused BIPAP, currently he is saturating fine on nasal cannula. dionne he was down graded.    Patient has ESRD , he got his dialysis as scheduled. Dr La was nephrologist.        PT recommended GREER for him.    Patient spiked temp of 100.8  and 101, blood cx were sent twice they were negative. UA was positive so he was treated with rocephin.    PAtient also got vancomycin for possible cellulitis. he is being discharged on vancomycin , needs 2 more doses.    Patient is medically stable for discharge , Case is discussed with attending.

## 2019-12-04 NOTE — PROGRESS NOTE ADULT - ASSESSMENT
# ESRD. HD in AM with UF as tolerated  #hyperkalemia. dialysed yesterday against  a 1 k+ bath. No labs to review today.  #HTN controlled  #hyponatremia from hypervolemia. Control of fluid intake re-inforced  # Chronic Kidney Disease Stage . elevated phos. Cont sevelamer 1600mg three times per day AC  # anemia of CKD. Procrit on HD

## 2019-12-04 NOTE — CONSULT NOTE ADULT - SUBJECTIVE AND OBJECTIVE BOX
39 RHM admitted for acute pulmonary edema states that today he developed an acute flare of chronic lower back pain    On exam, patient has TTP at mid-lumbar region, left lower back, and left hip. He is unable to move the left leg while in bed but when encouraged to stand up and ambulate with a walker, he is able to do so without loss of balance. No sensory deficit x 4, No other motor deficits. 1+ DTRs x 4 & Mute toes b/l.      DDx: Lumbosacral radiculopathy with left-sided sciatica      Recs:  - Approved for MRI Lumbosacral w/o Gadolinium       - If there is significant nerve root impingement / disc herniation, involve Ortho-Spine consult  - Conservative pain management  - PT/OT        NOTE TO BE COMPLETED - PLEASE REFER TO ABOVE ONLY AND IGNORE INFORMATION BELOW        NEUROLOGY CONSULT NOTE    NAME:  PRESTON JOHNSON    CHIEF COMPLAINT:  Patient is a 39y old  Male who presents with a chief complaint of acute pulmonary edema (04 Dec 2019 16:27)      HPI:  38 y/o morbidly obese male from home PMH ESRD (on MWF HD via LUE AVF at [facility] w/ Dr La), HTN, IDDM, Substance abuse, COPD (former smoker, on home oxygen 2L), Bipolar disorder, and Schizophrenia p/w shortness of breath x 1 day s/p missed HD - HPI limited given patient's moderate work of breathing. Patient states he was suppose to go for HD Friday (schedule was changed 2/2 Thanksgiving week but he was never picked up) and as such developed dyspnea, cough, and work of breathing. In the ED patient seen in acute distress initially placed on standard face mask given but patient refused 2/2 claustrophobia. As per the ED provider patient was not hypoxia. Patient has chronic complaints of pain of his lower extremities - he is actively seeking for a pain management specialist. Patient seen and examined at bedside severely anxious with increased respiratory rate. (30 Nov 2019 21:33)      NEURO HPI:      PAST MEDICAL & SURGICAL HISTORY:  COPD (chronic obstructive pulmonary disease)  Migraine  HTN (hypertension)  DM (diabetes mellitus)  Bipolar disorder  Schizophrenia  ESRD on dialysis  Morbid obesity with BMI of 40.0-44.9, adult  H/O hernia repair      MEDICATIONS:  albuterol/ipratropium for Nebulization 3 milliLiter(s) Nebulizer every 6 hours  carvedilol 6.25 milliGRAM(s) Oral every 12 hours  chlorhexidine 4% Liquid 1 Application(s) Topical daily  clonazePAM  Tablet 0.5 milliGRAM(s) Oral every 12 hours PRN  clopidogrel Tablet 75 milliGRAM(s) Oral daily  guaiFENesin    Syrup 100 milliGRAM(s) Oral every 6 hours PRN  heparin  Injectable 5000 Unit(s) SubCutaneous every 8 hours  hydrALAZINE 25 milliGRAM(s) Oral three times a day  insulin lispro (HumaLOG) corrective regimen sliding scale   SubCutaneous Before meals and at bedtime  isosorbide   mononitrate ER Tablet (IMDUR) 30 milliGRAM(s) Oral daily  methocarbamol 500 milliGRAM(s) Oral two times a day  mirtazapine 7.5 milliGRAM(s) Oral daily  oxycodone    5 mG/acetaminophen 325 mG 1 Tablet(s) Oral every 6 hours PRN  polyethylene glycol 3350 17 Gram(s) Oral every 12 hours  risperiDONE   Tablet 1 milliGRAM(s) Oral every 12 hours  senna 2 Tablet(s) Oral at bedtime  sevelamer carbonate 1600 milliGRAM(s) Oral three times a day with meals  simvastatin 40 milliGRAM(s) Oral at bedtime  sodium zirconium cyclosilicate 5 Gram(s) Oral daily      ALLERGIES:  aspirin (Short breath)  aspirin (Swelling)  fish (Unknown)  penicillin (Short breath)  penicillins (Swelling)  shellfish (Unknown)      FAMILY HISTORY:  Family history of COPD (chronic obstructive pulmonary disease)  Family history of diabetes mellitus      SOCIAL HISTORY:  Denies alcohol, tobacco, and illicit drug use    REVIEW OF SYSTEMS:  GENERAL: No fever, weight changes, fatigue  EYES: No eye pain or discharge  EAR/NOSE/MOUTH/THROAT: No sinus or throat pain; No difficulty hearing  NECK: No pain or stiffness  RESPIRATORY: No cough, wheezing, chills, or hemoptysis  CARDIOVASCULAR: No chest pain, palpitations, shortness of breath, or dyspnea on exertion  GASTROINTESTINAL: No abdominal pain, nausea, vomiting, hematemesis, diarrhea, or constipation  GENITOURINARY: No dysuria, frequency, hematuria, or incontinence  SKIN: No rashes or lesions  ENDOCRINE: No heat or cold intolerance  HEMATOLOGIC: No easy bruising or bleeding  PSYCHIATRIC: No depression, anxiety, or mood swings  MUSCULOSKELETAL: No joint pain or swelling  NEUROLOGICAL: As per HPI      OBJECTIVE:    Vital Signs Last 24 Hrs  T(C): 37.2 (04 Dec 2019 20:34), Max: 37.2 (04 Dec 2019 20:34)  T(F): 99 (04 Dec 2019 20:34), Max: 99 (04 Dec 2019 20:34)  HR: 88 (04 Dec 2019 20:34) (71 - 88)  BP: 128/69 (04 Dec 2019 20:34) (108/45 - 128/69)  BP(mean): --  RR: 18 (04 Dec 2019 20:34) (17 - 18)  SpO2: 100% (04 Dec 2019 20:34) (96% - 100%)    General Examination:  General: No acute distress  HEENT: Atraumatic, Normocephalic  Respiratory: CTA B/l.  No crackles, rhonchi, or wheezes.  Cardiovascular: RRR.  Normal S1 & S2.  Normal b/l radial and pedal pulses.    Neurological Examination:  General / Mental Status: AAO x 3.  No aphasia or dysarthria.  Naming and repetition intact.  Cranial Nerves: VFF x 4.  PERRL.  EOMI x 2, No nystagmus or diplopia.  B/l V1-V3 equal and intact to light touch and pinprick.  Symmetric facial movement and palate elevation.  B/l hearing equal to finger rub.  5/5 strength with b/l sternocleidomastoid & trapezius.  Midline tongue protrusion, with no atrophy or fasciculations.  Motor: Normal bulk & tone in all four extremities.  5/5 strength throughout all four extremities.  No downward drift, rigidity, spasticity, or tremors in any of the four extremities.  Sensory: Intact to light touch and pinprick in all four extremities.  Negative Romberg.  Reflex: 2+ and symmetric at b/l biceps, triceps, brachioradialis, patellae, and ankles.  Downgoing toes b/l.  Coordination: No dysmetria with b/l finger-to-nose and heel raise tests.  Symmetric rapid alternating movements b/l.  Gait: Normal, narrow-based gait.  No difficulty with tiptoe, heel, and tandem gaits.      Laboratory Values:    CBC Full  -  ( 04 Dec 2019 14:16 )  WBC Count : 9.29 K/uL  RBC Count : 3.28 M/uL  Hemoglobin : 8.9 g/dL  Hematocrit : 31.2 %  Platelet Count - Automated : 301 K/uL  Mean Cell Volume : 95.1 fl  Mean Cell Hemoglobin : 27.1 pg  Mean Cell Hemoglobin Concentration : 28.5 gm/dL  Auto Neutrophil # : x  Auto Lymphocyte # : x  Auto Monocyte # : x  Auto Eosinophil # : x  Auto Basophil # : x  Auto Neutrophil % : x  Auto Lymphocyte % : x  Auto Monocyte % : x  Auto Eosinophil % : x  Auto Basophil % : x      12-04    133<L>  |  93<L>  |  78<H>  ----------------------------<  168<H>  4.6   |  29  |  7.64<H>    Ca    8.6      04 Dec 2019 14:16  Phos  7.6     12-04  Mg     2.4     12-04    TPro  7.4  /  Alb  2.5<L>  /  TBili  0.5  /  DBili  x   /  AST  7<L>  /  ALT  16  /  AlkPhos  210<H>  12-04    LIVER FUNCTIONS - ( 04 Dec 2019 14:16 )  Alb: 2.5 g/dL / Pro: 7.4 g/dL / ALK PHOS: 210 U/L / ALT: 16 U/L DA / AST: 7 U/L / GGT: x             10-25 Chol 144 LDL 74 HDL 54 Trig 78                Neuroimaging:      Please contact the Neurology consult service with any questions.    Jose Mane MD   of Neurology  [facility] NEUROLOGY CONSULT NOTE    NAME:  PRESTON JOHNSON    CHIEF COMPLAINT:  Patient is a 39y old  Male who presents with a chief complaint of acute pulmonary edema (04 Dec 2019 16:27)    HPI:  40 y/o morbidly obese male from home PMH ESRD (on MWF HD via LUE [facility] at [facility] w/ Dr La), HTN, IDDM, Substance abuse, COPD (former smoker, on home oxygen 2L), Bipolar disorder, and Schizophrenia p/w shortness of breath x 1 day s/p missed HD - HPI limited given patient's moderate work of breathing. Patient states he was suppose to go for HD Friday (schedule was changed 2/2 Thanksgiving week but he was never picked up) and as such developed dyspnea, cough, and work of breathing. In the ED patient seen in acute distress initially placed on standard face mask given but patient refused 2/2 claustrophobia. As per the ED provider patient was not hypoxia. Patient has chronic complaints of pain of his lower extremities - he is actively seeking for a pain management specialist. Patient seen and examined at bedside severely anxious with increased respiratory rate. (30 Nov 2019 21:33)    NEURO HPI:  39 RHM admitted for acute pulmonary edema states that today he developed an acute flare of chronic lower back pain    PAST MEDICAL & SURGICAL HISTORY:  COPD (chronic obstructive pulmonary disease)  Migraine  HTN (hypertension)  DM (diabetes mellitus)  Bipolar disorder  Schizophrenia  ESRD on dialysis  Morbid obesity with BMI of 40.0-44.9, adult  H/O hernia repair    MEDICATIONS:  albuterol/ipratropium for Nebulization 3 milliLiter(s) Nebulizer every 6 hours  carvedilol 6.25 milliGRAM(s) Oral every 12 hours  chlorhexidine 4% Liquid 1 Application(s) Topical daily  clonazePAM  Tablet 0.5 milliGRAM(s) Oral every 12 hours PRN  clopidogrel Tablet 75 milliGRAM(s) Oral daily  guaiFENesin    Syrup 100 milliGRAM(s) Oral every 6 hours PRN  heparin  Injectable 5000 Unit(s) SubCutaneous every 8 hours  hydrALAZINE 25 milliGRAM(s) Oral three times a day  insulin lispro (HumaLOG) corrective regimen sliding scale   SubCutaneous Before meals and at bedtime  isosorbide   mononitrate ER Tablet (IMDUR) 30 milliGRAM(s) Oral daily  methocarbamol 500 milliGRAM(s) Oral two times a day  mirtazapine 7.5 milliGRAM(s) Oral daily  oxycodone    5 mG/acetaminophen 325 mG 1 Tablet(s) Oral every 6 hours PRN  polyethylene glycol 3350 17 Gram(s) Oral every 12 hours  risperiDONE   Tablet 1 milliGRAM(s) Oral every 12 hours  senna 2 Tablet(s) Oral at bedtime  sevelamer carbonate 1600 milliGRAM(s) Oral three times a day with meals  simvastatin 40 milliGRAM(s) Oral at bedtime  sodium zirconium cyclosilicate 5 Gram(s) Oral daily    ALLERGIES:  aspirin (Short breath)  aspirin (Swelling)  fish (Unknown)  penicillin (Short breath)  penicillins (Swelling)  shellfish (Unknown)    FAMILY HISTORY:  Family history of COPD (chronic obstructive pulmonary disease)  Family history of diabetes mellitus    SOCIAL HISTORY:  Denies alcohol, tobacco, and illicit drug use    REVIEW OF SYSTEMS:  GENERAL: No fever, weight changes  EYES: No eye pain or discharge  EAR/NOSE/MOUTH/THROAT: No sinus or throat pain; No difficulty hearing  NECK: No pain or stiffness  RESPIRATORY: Recent cough and difficulty breathing  CARDIOVASCULAR: Recent chest discomfort and shortness of breath; No palpitations  GASTROINTESTINAL: No abdominal pain, nausea, vomiting  GENITOURINARY: No dysuria, frequency  SKIN: No rashes or lesions  ENDOCRINE: No heat or cold intolerance  HEMATOLOGIC: No easy bruising or bleeding  PSYCHIATRIC: H/o bipolar disorder and schizophrenia  MUSCULOSKELETAL: As per HPI  NEUROLOGICAL: As per HPI      OBJECTIVE:    Vital Signs Last 24 Hrs  T(C): 37.2 (04 Dec 2019 20:34), Max: 37.2 (04 Dec 2019 20:34)  T(F): 99 (04 Dec 2019 20:34), Max: 99 (04 Dec 2019 20:34)  HR: 88 (04 Dec 2019 20:34) (71 - 88)  BP: 128/69 (04 Dec 2019 20:34) (108/45 - 128/69)  RR: 18 (04 Dec 2019 20:34) (17 - 18)  SpO2: 100% (04 Dec 2019 20:34) (96% - 100%)    General Examination:  General: In moderate distress due to back pain; Morbidly obese  HEENT: Atraumatic, Normocephalic  Respiratory: B/l wheezing present.  Cardiovascular: RRR.  Normal S1 & S2.  Normal b/l radial and pedal pulses.  Back: Tenderness to palpation at mid-lumbar region, left lower back, and left hip.    Neurological Examination:  General / Mental Status: AAO x 3.  No aphasia or dysarthria.  Naming and repetition intact.  Cranial Nerves: VFF x 4.  PERRL.  EOMI x 2, No nystagmus or diplopia.  B/l V1-V3 equal and intact to light touch and pinprick.  Symmetric facial movement and palate elevation.  B/l hearing equal to finger rub.  5/5 strength with b/l sternocleidomastoid & trapezius.  Midline tongue protrusion, with no atrophy or fasciculations.  Motor: Normal bulk & tone in all four extremities.  Unable to move left leg while in bed, but when encouraged to stand up and ambulate with a walker, he is able to do so - therefore has 4-5 out of 5 strength throughout left lower extremity. 5/5 strength throughout right lower extremity and b/l upper extremities.  No downward drift, rigidity, spasticity, or tremors in any of the four extremities.  Sensory: Intact to light touch and pinprick in all four extremities.  Reflex: 1+ and symmetric at b/l biceps, triceps, brachioradialis, patellae, and ankles.  Mute toes b/l.  Coordination: No dysmetria with b/l finger-to-nose and heel raise tests, although effort is limited in left lower extremity.  Gait: Wide-based, Antalgic, requiring walker to ambulate, with no loss of balance.  Unable to test specialized gaits or Romberg sign.      Laboratory Values:    CBC Full  -  ( 04 Dec 2019 14:16 )  WBC Count : 9.29 K/uL  RBC Count : 3.28 M/uL  Hemoglobin : 8.9 g/dL  Hematocrit : 31.2 %  Platelet Count - Automated : 301 K/uL  Mean Cell Volume : 95.1 fl  Mean Cell Hemoglobin : 27.1 pg  Mean Cell Hemoglobin Concentration : 28.5 gm/dL    12-04    133<L>  |  93<L>  |  78<H>  ----------------------------<  168<H>  4.6   |  29  |  7.64<H>    Ca    8.6      04 Dec 2019 14:16  Phos  7.6     12-04  Mg     2.4     12-04    TPro  7.4  /  Alb  2.5<L>  /  TBili  0.5  /  DBili  x   /  AST  7<L>  /  ALT  16  /  AlkPhos  210<H>  12-04    10-25 Chol 144 LDL 74 HDL 54 Trig 78      Neuroimaging: None recent      Assessment:  39 RHM with lumbosacral radiculopathy with left-sided sciatica, although possibly with conversion disorder vs. malingering given ability to bear weight with the left lower extremity.      Recommendations:    - Approved for MRI Lumbosacral w/o Gadolinium       - If there is significant nerve root impingement / disc herniation, involve Ortho-Spine consult    - Conservative pain management; avoid opiates to minimize risk of cognitive impairment    - PT/OT      Please contact the Neurology consult service with any questions.    Jose Mane MD   of Neurology  [facility]

## 2019-12-04 NOTE — PROGRESS NOTE ADULT - PROBLEM SELECTOR PLAN 2
IDDM prior A1c 5.3  - Goal  -180; outpatient regimen 15 U premeal but prior admission managed w/ ISS alone  - C/w ISS and FSQ6

## 2019-12-04 NOTE — PROGRESS NOTE ADULT - PROBLEM SELECTOR PLAN 4
S/p HD 12/2 for APE  - Pt still has elevated K and P, sevalamer dose increased and pt started on lokelma, pt also eating outside food which may be contributing  - HD as per Nephrology Dr La and monitor BMP

## 2019-12-04 NOTE — CHART NOTE - NSCHARTNOTEFT_GEN_A_CORE
Patient has been asking for pain meds especially opioids, please refrain from giving him parenteral opioids and un neccesary tests.

## 2019-12-04 NOTE — PROGRESS NOTE ADULT - SUBJECTIVE AND OBJECTIVE BOX
[facility] : Progress Note :: [phone], (office [phone]),   Dr La / Dr Hui / Dr Barber / Dr Villalobos / Dr Hang CASTELLANO / Dr Mehta / Dr Sanchez / Dr Alber dumont  _____________________________________________________________________________________________    s/p HD yesterday.    aspirin (Short breath)  aspirin (Swelling)  fish (Unknown)  penicillin (Short breath)  penicillins (Swelling)  shellfish (Unknown)    Hospital Medications:   MEDICATIONS  (STANDING):  albuterol/ipratropium for Nebulization 3 milliLiter(s) Nebulizer every 6 hours  carvedilol 6.25 milliGRAM(s) Oral every 12 hours  chlorhexidine 4% Liquid 1 Application(s) Topical daily  clopidogrel Tablet 75 milliGRAM(s) Oral daily  epoetin cyndie Injectable 68557 Unit(s) IV Push <User Schedule>  heparin  Injectable 5000 Unit(s) SubCutaneous every 8 hours  hydrALAZINE 25 milliGRAM(s) Oral three times a day  insulin lispro (HumaLOG) corrective regimen sliding scale   SubCutaneous every 6 hours  isosorbide   mononitrate ER Tablet (IMDUR) 30 milliGRAM(s) Oral daily  methocarbamol 500 milliGRAM(s) Oral two times a day  mirtazapine 7.5 milliGRAM(s) Oral daily  polyethylene glycol 3350 17 Gram(s) Oral every 12 hours  risperiDONE   Tablet 1 milliGRAM(s) Oral every 12 hours  senna 2 Tablet(s) Oral at bedtime  sevelamer carbonate 1600 milliGRAM(s) Oral three times a day with meals  simvastatin 40 milliGRAM(s) Oral at bedtime  sodium zirconium cyclosilicate 5 Gram(s) Oral daily        VITALS:  T(F): 98.8 (12-04-19 @ 14:40), Max: 98.8 (12-03-19 @ 18:43)  HR: 80 (12-04-19 @ 14:40)  BP: 112/55 (12-04-19 @ 14:40)  RR: 18 (12-04-19 @ 14:40)  SpO2: 96% (12-04-19 @ 14:40)  Wt(kg): --    12-03 @ 07:01  -  12-04 @ 07:00  --------------------------------------------------------  IN: 350 mL / OUT: 100 mL / NET: 250 mL        PHYSICAL EXAM:  Constitutional: NAD  HEENT: anicteric sclera, oropharynx clear.  Neck: No JVD  Respiratory: CTAB, no wheezes, rales or rhonchi  Cardiovascular: S1, S2, RRR  Gastrointestinal: BS+, soft, NT/ND  Extremities:  peripheral edema++  Neurological: A/O x 3, no focal deficits  Vascular Access: AVF with thrill and bruit    LABS:  12-04    133<L>  |  93<L>  |  78<H>  ----------------------------<  168<H>  4.6   |  29  |  7.64<H>    Ca    8.6      04 Dec 2019 14:16  Phos  7.6     12-04  Mg     2.4     12-04    TPro  7.4  /  Alb  2.5<L>  /  TBili  0.5  /  DBili      /  AST  7<L>  /  ALT  16  /  AlkPhos  210<H>  12-04    Creatinine Trend: 7.64 <--, 8.17 <--, 9.55 <--, 8.14 <--, 10.70 <--                        8.9    9.29  )-----------( 301      ( 04 Dec 2019 14:16 )             31.2     Urine Studies:      RADIOLOGY & ADDITIONAL STUDIES:

## 2019-12-04 NOTE — DISCHARGE NOTE PROVIDER - NSDCCPCAREPLAN_GEN_ALL_CORE_FT
PRINCIPAL DISCHARGE DIAGNOSIS  Diagnosis: Pulmonary edema  Assessment and Plan of Treatment:       SECONDARY DISCHARGE DIAGNOSES  Diagnosis: Respiratory distress  Assessment and Plan of Treatment:     Diagnosis: Hemodialysis patient  Assessment and Plan of Treatment: PRINCIPAL DISCHARGE DIAGNOSIS  Diagnosis: Pulmonary edema  Assessment and Plan of Treatment: You were admitted due to shortness of breath. Upon arrival you refused BIPAP, and oxygen saturation was within normal limits on nasal cannula. You received Hemodialysis and your condition improved. You are stable for discharge. Follow up with your Primary Care Physician for further recommendation      SECONDARY DISCHARGE DIAGNOSES  Diagnosis: Respiratory distress  Assessment and Plan of Treatment:     Diagnosis: Hemodialysis patient  Assessment and Plan of Treatment: PRINCIPAL DISCHARGE DIAGNOSIS  Diagnosis: Pulmonary edema  Assessment and Plan of Treatment: You were admitted due to shortness of breath. Upon arrival you refused BIPAP, and oxygen saturation was within normal limits on nasal cannula. You received Hemodialysis and your condition improved. You are stable for discharge. Follow up with your Primary Care Physician for further recommendation      SECONDARY DISCHARGE DIAGNOSES  Diagnosis: UTI (urinary tract infection)  Assessment and Plan of Treatment: Your UA was positive infection, you are being discharged on ceftin twice a day for 3 days. please follow up with your PCP for further recommendations    Diagnosis: Hemodialysis patient  Assessment and Plan of Treatment: you got your dialysis here a per nephrologist recommendations. You are recommended to follow up with your nephrologist for further recommendations and follow your dialysis schedule. PRINCIPAL DISCHARGE DIAGNOSIS  Diagnosis: Pulmonary edema  Assessment and Plan of Treatment: You were admitted due to shortness of breath. Upon arrival you refused BIPAP, and oxygen saturation was within normal limits on nasal cannula. You received Hemodialysis and your condition improved. You are stable for discharge. Follow up with your Primary Care Physician for further recommendation      SECONDARY DISCHARGE DIAGNOSES  Diagnosis: Cellulitis  Assessment and Plan of Treatment: You were noted to have cellulitis of left lower extremity and was liklely the reason for your fevers. You were treated with Vancomycin and fevers improved. You need 2 more doses of vanco with dialysis until 12/18.   Please check Vancotrough before next dose and make adjustements accordingly.    Diagnosis: UTI (urinary tract infection)  Assessment and Plan of Treatment: Your UA was positive infection, you are being discharged on ceftin twice a day for 3 days. please follow up with your PCP for further recommendations    Diagnosis: Hemodialysis patient  Assessment and Plan of Treatment: you got your dialysis here a per nephrologist recommendations. You are recommended to follow up with your nephrologist for further recommendations and follow your dialysis schedule.

## 2019-12-04 NOTE — PROGRESS NOTE ADULT - PROBLEM SELECTOR PLAN 1
cute respiratory failure 2/2 acute pulmonary edema 2/2 missed HD   - Patient refused nasal BIPAP; C/w NC  - does not have COPD, dc asteroids today   - outpatient PFTs showed restrictive lung disease likely from his obesity  - C/w supplemental oxygen and bronchodilators for possible asthma  -pt also seems to have KRYSTIN given VBG showing chronic elevations CO2  -chronic cocaine use may also be contributing to the bronchospasm

## 2019-12-05 DIAGNOSIS — R29.898 OTHER SYMPTOMS AND SIGNS INVOLVING THE MUSCULOSKELETAL SYSTEM: ICD-10-CM

## 2019-12-05 LAB
ANION GAP SERPL CALC-SCNC: 9 MMOL/L — SIGNIFICANT CHANGE UP (ref 5–17)
ANISOCYTOSIS BLD QL: SLIGHT — SIGNIFICANT CHANGE UP
BASOPHILS # BLD AUTO: 0.12 K/UL — SIGNIFICANT CHANGE UP (ref 0–0.2)
BASOPHILS NFR BLD AUTO: 1 % — SIGNIFICANT CHANGE UP (ref 0–2)
BUN SERPL-MCNC: 89 MG/DL — HIGH (ref 7–18)
CALCIUM SERPL-MCNC: 7.9 MG/DL — LOW (ref 8.4–10.5)
CHLORIDE SERPL-SCNC: 93 MMOL/L — LOW (ref 96–108)
CO2 SERPL-SCNC: 29 MMOL/L — SIGNIFICANT CHANGE UP (ref 22–31)
CREAT SERPL-MCNC: 8.69 MG/DL — HIGH (ref 0.5–1.3)
EOSINOPHIL # BLD AUTO: 0.12 K/UL — SIGNIFICANT CHANGE UP (ref 0–0.5)
EOSINOPHIL NFR BLD AUTO: 1 % — SIGNIFICANT CHANGE UP (ref 0–6)
GLUCOSE BLDC GLUCOMTR-MCNC: 101 MG/DL — HIGH (ref 70–99)
GLUCOSE BLDC GLUCOMTR-MCNC: 112 MG/DL — HIGH (ref 70–99)
GLUCOSE BLDC GLUCOMTR-MCNC: 115 MG/DL — HIGH (ref 70–99)
GLUCOSE BLDC GLUCOMTR-MCNC: 138 MG/DL — HIGH (ref 70–99)
GLUCOSE BLDC GLUCOMTR-MCNC: 146 MG/DL — HIGH (ref 70–99)
GLUCOSE SERPL-MCNC: 111 MG/DL — HIGH (ref 70–99)
HCT VFR BLD CALC: 28.9 % — LOW (ref 39–50)
HGB BLD-MCNC: 8.5 G/DL — LOW (ref 13–17)
LYMPHOCYTES # BLD AUTO: 0.62 K/UL — LOW (ref 1–3.3)
LYMPHOCYTES # BLD AUTO: 5 % — LOW (ref 13–44)
MAGNESIUM SERPL-MCNC: 2.2 MG/DL — SIGNIFICANT CHANGE UP (ref 1.6–2.6)
MANUAL SMEAR VERIFICATION: SIGNIFICANT CHANGE UP
MCHC RBC-ENTMCNC: 27.3 PG — SIGNIFICANT CHANGE UP (ref 27–34)
MCHC RBC-ENTMCNC: 29.4 GM/DL — LOW (ref 32–36)
MCV RBC AUTO: 92.9 FL — SIGNIFICANT CHANGE UP (ref 80–100)
MONOCYTES # BLD AUTO: 1.49 K/UL — HIGH (ref 0–0.9)
MONOCYTES NFR BLD AUTO: 12 % — SIGNIFICANT CHANGE UP (ref 2–14)
NEUTROPHILS # BLD AUTO: 10.04 K/UL — HIGH (ref 1.8–7.4)
NEUTROPHILS NFR BLD AUTO: 81 % — HIGH (ref 43–77)
NRBC # BLD: 0 /100 — SIGNIFICANT CHANGE UP (ref 0–0)
OVALOCYTES BLD QL SMEAR: SLIGHT — SIGNIFICANT CHANGE UP
PHOSPHATE SERPL-MCNC: 6.6 MG/DL — HIGH (ref 2.5–4.5)
PLAT MORPH BLD: NORMAL — SIGNIFICANT CHANGE UP
PLATELET # BLD AUTO: 314 K/UL — SIGNIFICANT CHANGE UP (ref 150–400)
POTASSIUM SERPL-MCNC: 4.9 MMOL/L — SIGNIFICANT CHANGE UP (ref 3.5–5.3)
POTASSIUM SERPL-SCNC: 4.9 MMOL/L — SIGNIFICANT CHANGE UP (ref 3.5–5.3)
RBC # BLD: 3.11 M/UL — LOW (ref 4.2–5.8)
RBC # FLD: 18.6 % — HIGH (ref 10.3–14.5)
RBC BLD AUTO: ABNORMAL
SODIUM SERPL-SCNC: 131 MMOL/L — LOW (ref 135–145)
WBC # BLD: 12.4 K/UL — HIGH (ref 3.8–10.5)
WBC # FLD AUTO: 12.4 K/UL — HIGH (ref 3.8–10.5)

## 2019-12-05 PROCEDURE — 99233 SBSQ HOSP IP/OBS HIGH 50: CPT | Mod: GC

## 2019-12-05 RX ADMIN — HEPARIN SODIUM 5000 UNIT(S): 5000 INJECTION INTRAVENOUS; SUBCUTANEOUS at 05:01

## 2019-12-05 RX ADMIN — SODIUM ZIRCONIUM CYCLOSILICATE 5 GRAM(S): 10 POWDER, FOR SUSPENSION ORAL at 11:35

## 2019-12-05 RX ADMIN — OXYCODONE AND ACETAMINOPHEN 1 TABLET(S): 5; 325 TABLET ORAL at 06:01

## 2019-12-05 RX ADMIN — SEVELAMER CARBONATE 1600 MILLIGRAM(S): 2400 POWDER, FOR SUSPENSION ORAL at 11:35

## 2019-12-05 RX ADMIN — Medication 3 MILLILITER(S): at 15:02

## 2019-12-05 RX ADMIN — Medication 3 MILLILITER(S): at 08:59

## 2019-12-05 RX ADMIN — CLOPIDOGREL BISULFATE 75 MILLIGRAM(S): 75 TABLET, FILM COATED ORAL at 11:35

## 2019-12-05 RX ADMIN — ISOSORBIDE MONONITRATE 30 MILLIGRAM(S): 60 TABLET, EXTENDED RELEASE ORAL at 11:39

## 2019-12-05 RX ADMIN — CARVEDILOL PHOSPHATE 6.25 MILLIGRAM(S): 80 CAPSULE, EXTENDED RELEASE ORAL at 05:00

## 2019-12-05 RX ADMIN — Medication 100 MILLIGRAM(S): at 10:49

## 2019-12-05 RX ADMIN — Medication 2 MILLIGRAM(S): at 13:46

## 2019-12-05 RX ADMIN — Medication 3 MILLILITER(S): at 03:57

## 2019-12-05 RX ADMIN — RISPERIDONE 1 MILLIGRAM(S): 4 TABLET ORAL at 05:01

## 2019-12-05 RX ADMIN — POLYETHYLENE GLYCOL 3350 17 GRAM(S): 17 POWDER, FOR SOLUTION ORAL at 05:01

## 2019-12-05 RX ADMIN — HEPARIN SODIUM 5000 UNIT(S): 5000 INJECTION INTRAVENOUS; SUBCUTANEOUS at 22:08

## 2019-12-05 RX ADMIN — OXYCODONE AND ACETAMINOPHEN 1 TABLET(S): 5; 325 TABLET ORAL at 04:59

## 2019-12-05 RX ADMIN — Medication 25 MILLIGRAM(S): at 05:00

## 2019-12-05 RX ADMIN — CHLORHEXIDINE GLUCONATE 1 APPLICATION(S): 213 SOLUTION TOPICAL at 11:36

## 2019-12-05 RX ADMIN — METHOCARBAMOL 500 MILLIGRAM(S): 500 TABLET, FILM COATED ORAL at 05:01

## 2019-12-05 RX ADMIN — SEVELAMER CARBONATE 1600 MILLIGRAM(S): 2400 POWDER, FOR SUSPENSION ORAL at 08:05

## 2019-12-05 RX ADMIN — Medication 100 MILLIGRAM(S): at 02:52

## 2019-12-05 RX ADMIN — MIRTAZAPINE 7.5 MILLIGRAM(S): 45 TABLET, ORALLY DISINTEGRATING ORAL at 11:35

## 2019-12-05 NOTE — PROGRESS NOTE ADULT - PROBLEM SELECTOR PLAN 1
cute respiratory failure 2/2 acute pulmonary edema 2/2 missed HD   - Patient refused nasal BIPAP; C/w NC  - does not have COPD, dc asteroids today   - outpatient PFTs showed restrictive lung disease likely from his obesity  - C/w supplemental oxygen and bronchodilators for possible asthma  -pt also seems to have KRYSTIN given VBG showing chronic elevations CO2  -chronic cocaine use may also be contributing to the bronchospasm cute respiratory failure 2/2 acute pulmonary edema 2/2 missed HD   - Patient refused nasal BIPAP; C/w NC  - does not have COPD, stable on nasal oxygen  - outpatient PFTs showed restrictive lung disease likely from his obesity  - C/w supplemental oxygen and bronchodilators for possible asthma  -pt also seems to have KRYSTIN given VBG showing chronic elevations CO2  -chronic cocaine use may also be contributing to the bronchospasm

## 2019-12-05 NOTE — PROGRESS NOTE ADULT - PROBLEM SELECTOR PLAN 2
IDDM prior A1c 5.3  - Goal  -180; outpatient regimen 15 U premeal but prior admission managed w/ ISS alone  - C/w ISS and FS ac/hs

## 2019-12-05 NOTE — PROGRESS NOTE ADULT - PROBLEM SELECTOR PLAN 6
Patient is on heparin for DVT prophylaxis.                                   1 -Patient is non compliant with diet and exercise.

## 2019-12-05 NOTE — PROGRESS NOTE ADULT - SUBJECTIVE AND OBJECTIVE BOX
PGY 1 Note discussed with supervising resident and primary attending    Patient is a 39y old  Male who presents with a chief complaint of acute pulmonary edema (04 Dec 2019 19:00)      INTERVAL HPI/OVERNIGHT EVENTS: Patient was seen and examined at bed side, was complaining of left foot numbness, Neuro is following , has recommended MRI of lumbosacral region.  PAtient is scheduled for dialysis today.     MEDICATIONS  (STANDING):  albuterol/ipratropium for Nebulization 3 milliLiter(s) Nebulizer every 6 hours  carvedilol 6.25 milliGRAM(s) Oral every 12 hours  chlorhexidine 4% Liquid 1 Application(s) Topical daily  clopidogrel Tablet 75 milliGRAM(s) Oral daily  heparin  Injectable 5000 Unit(s) SubCutaneous every 8 hours  hydrALAZINE 25 milliGRAM(s) Oral three times a day  insulin lispro (HumaLOG) corrective regimen sliding scale   SubCutaneous Before meals and at bedtime  isosorbide   mononitrate ER Tablet (IMDUR) 30 milliGRAM(s) Oral daily  methocarbamol 500 milliGRAM(s) Oral two times a day  mirtazapine 7.5 milliGRAM(s) Oral daily  polyethylene glycol 3350 17 Gram(s) Oral every 12 hours  risperiDONE   Tablet 1 milliGRAM(s) Oral every 12 hours  senna 2 Tablet(s) Oral at bedtime  sevelamer carbonate 1600 milliGRAM(s) Oral three times a day with meals  simvastatin 40 milliGRAM(s) Oral at bedtime    MEDICATIONS  (PRN):  clonazePAM  Tablet 0.5 milliGRAM(s) Oral every 12 hours PRN Anxiety  guaiFENesin    Syrup 100 milliGRAM(s) Oral every 6 hours PRN Cough  oxycodone    5 mG/acetaminophen 325 mG 1 Tablet(s) Oral every 6 hours PRN Moderate Pain (4 - 6)      __________________________________________________  REVIEW OF SYSTEMS:    CONSTITUTIONAL: No fever,   EYES: no acute visual disturbances  NECK: No pain or stiffness  RESPIRATORY: No cough; No shortness of breath  CARDIOVASCULAR: No chest pain, no palpitations  GASTROINTESTINAL: No pain. No nausea or vomiting; No diarrhea   NEUROLOGICAL: No headache or numbness, no tremors  MUSCULOSKELETAL: No joint pain, left leg numbness  GENITOURINARY: no dysuria, no frequency, no hesitancy  PSYCHIATRY: no depression , no anxiety  ALL OTHER  ROS negative        Vital Signs Last 24 Hrs  T(C): 37.7 (05 Dec 2019 04:50), Max: 37.7 (05 Dec 2019 04:50)  T(F): 99.8 (05 Dec 2019 04:50), Max: 99.8 (05 Dec 2019 04:50)  HR: 90 (05 Dec 2019 11:38) (79 - 90)  BP: 147/73 (05 Dec 2019 11:38) (108/45 - 147/73)  BP(mean): --  RR: 18 (05 Dec 2019 04:50) (18 - 18)  SpO2: 98% (05 Dec 2019 04:50) (96% - 100%)    ________________________________________________  PHYSICAL EXAM:  GENERAL: Obese male   HEENT: Normocephalic;  conjunctivae and sclerae clear; moist mucous membranes;   NECK : supple  CHEST/LUNG: Clear to auscultation bilaterally with good air entry   HEART: S1 S2  regular; no murmurs, gallops or rubs  ABDOMEN: Soft, Nontender, Nondistended; Bowel sounds present  EXTREMITIES: no cyanosis; no edema; no calf tenderness  SKIN: warm and dry; no rash  NERVOUS SYSTEM:  Awake and alert; Oriented  to place, person and time ; no new deficits    _________________________________________________  LABS:                        8.9    9.29  )-----------( 301      ( 04 Dec 2019 14:16 )             31.2     12-04    133<L>  |  93<L>  |  78<H>  ----------------------------<  168<H>  4.6   |  29  |  7.64<H>    Ca    8.6      04 Dec 2019 14:16  Phos  7.6     12-04  Mg     2.4     12-04    TPro  7.4  /  Alb  2.5<L>  /  TBili  0.5  /  DBili  x   /  AST  7<L>  /  ALT  16  /  AlkPhos  210<H>  12-04        CAPILLARY BLOOD GLUCOSE      POCT Blood Glucose.: 138 mg/dL (05 Dec 2019 11:32)  POCT Blood Glucose.: 146 mg/dL (05 Dec 2019 07:40)  POCT Blood Glucose.: 151 mg/dL (04 Dec 2019 21:13)  POCT Blood Glucose.: 156 mg/dL (04 Dec 2019 16:41)        RADIOLOGY & ADDITIONAL TESTS:    Imaging Personally Reviewed:  YES/NO    Consultant(s) Notes Reviewed:   YES/ No    Care Discussed with Consultants :     Plan of care was discussed with patient and /or primary care giver; all questions and concerns were addressed and care was aligned with patient's wishes. PGY 1 Note discussed with supervising resident and primary attending    Patient is a 39y old  Male who presents with a chief complaint of acute pulmonary edema (04 Dec 2019 19:00)      INTERVAL HPI/OVERNIGHT EVENTS: Patient was seen and examined at bed side, was complaining of left foot numbness, Neuro is following , has recommended MRI of lumbosacral region.  Patient is scheduled for dialysis today. saturating well on nasal canula    MEDICATIONS  (STANDING):  albuterol/ipratropium for Nebulization 3 milliLiter(s) Nebulizer every 6 hours  carvedilol 6.25 milliGRAM(s) Oral every 12 hours  chlorhexidine 4% Liquid 1 Application(s) Topical daily  clopidogrel Tablet 75 milliGRAM(s) Oral daily  heparin  Injectable 5000 Unit(s) SubCutaneous every 8 hours  hydrALAZINE 25 milliGRAM(s) Oral three times a day  insulin lispro (HumaLOG) corrective regimen sliding scale   SubCutaneous Before meals and at bedtime  isosorbide   mononitrate ER Tablet (IMDUR) 30 milliGRAM(s) Oral daily  methocarbamol 500 milliGRAM(s) Oral two times a day  mirtazapine 7.5 milliGRAM(s) Oral daily  polyethylene glycol 3350 17 Gram(s) Oral every 12 hours  risperiDONE   Tablet 1 milliGRAM(s) Oral every 12 hours  senna 2 Tablet(s) Oral at bedtime  sevelamer carbonate 1600 milliGRAM(s) Oral three times a day with meals  simvastatin 40 milliGRAM(s) Oral at bedtime    MEDICATIONS  (PRN):  clonazePAM  Tablet 0.5 milliGRAM(s) Oral every 12 hours PRN Anxiety  guaiFENesin    Syrup 100 milliGRAM(s) Oral every 6 hours PRN Cough  oxycodone    5 mG/acetaminophen 325 mG 1 Tablet(s) Oral every 6 hours PRN Moderate Pain (4 - 6)      __________________________________________________  REVIEW OF SYSTEMS:    CONSTITUTIONAL: No fever,   EYES: no acute visual disturbances  NECK: No pain or stiffness  RESPIRATORY: No cough; No shortness of breath  CARDIOVASCULAR: No chest pain, no palpitations  GASTROINTESTINAL: No pain. No nausea or vomiting; No diarrhea   NEUROLOGICAL: No headache or numbness, no tremors  MUSCULOSKELETAL: No joint pain, left leg numbness  GENITOURINARY: no dysuria, no frequency, no hesitancy  PSYCHIATRY: no depression , no anxiety  ALL OTHER  ROS negative        Vital Signs Last 24 Hrs  T(C): 37.7 (05 Dec 2019 04:50), Max: 37.7 (05 Dec 2019 04:50)  T(F): 99.8 (05 Dec 2019 04:50), Max: 99.8 (05 Dec 2019 04:50)  HR: 90 (05 Dec 2019 11:38) (79 - 90)  BP: 147/73 (05 Dec 2019 11:38) (108/45 - 147/73)  BP(mean): --  RR: 18 (05 Dec 2019 04:50) (18 - 18)  SpO2: 98% (05 Dec 2019 04:50) (96% - 100%)    ________________________________________________  PHYSICAL EXAM:  GENERAL: Obese male   HEENT: Normocephalic;  conjunctivae and sclerae clear; moist mucous membranes;   NECK : supple  CHEST/LUNG: Clear to auscultation bilaterally with good air entry   HEART: S1 S2  regular; no murmurs, gallops or rubs  ABDOMEN: Soft, Nontender, Nondistended; Bowel sounds present  EXTREMITIES: no cyanosis; no edema; no calf tenderness  SKIN: warm and dry; no rash  NERVOUS SYSTEM:  Awake and alert; Oriented  to place, person and time ; no new deficits    _________________________________________________  LABS:                        8.9    9.29  )-----------( 301      ( 04 Dec 2019 14:16 )             31.2     12-04    133<L>  |  93<L>  |  78<H>  ----------------------------<  168<H>  4.6   |  29  |  7.64<H>    Ca    8.6      04 Dec 2019 14:16  Phos  7.6     12-04  Mg     2.4     12-04    TPro  7.4  /  Alb  2.5<L>  /  TBili  0.5  /  DBili  x   /  AST  7<L>  /  ALT  16  /  AlkPhos  210<H>  12-04        CAPILLARY BLOOD GLUCOSE      POCT Blood Glucose.: 138 mg/dL (05 Dec 2019 11:32)  POCT Blood Glucose.: 146 mg/dL (05 Dec 2019 07:40)  POCT Blood Glucose.: 151 mg/dL (04 Dec 2019 21:13)  POCT Blood Glucose.: 156 mg/dL (04 Dec 2019 16:41)        RADIOLOGY & ADDITIONAL TESTS:    Imaging Personally Reviewed:  YES/NO    Consultant(s) Notes Reviewed:   YES/ No    Care Discussed with Consultants :     Plan of care was discussed with patient and /or primary care giver; all questions and concerns were addressed and care was aligned with patient's wishes. PGY 1 Note discussed with supervising resident and primary attending    Patient is a 39y old  Male who presents with a chief complaint of acute pulmonary edema (04 Dec 2019 19:00)      INTERVAL HPI/OVERNIGHT EVENTS: Patient was seen and examined at bed side, was complaining of left foot numbness, Neuro is following , has recommended MRI of lumbosacral region.  Patient is scheduled for dialysis today. saturating well on nasal canula    MEDICATIONS  (STANDING):  albuterol/ipratropium for Nebulization 3 milliLiter(s) Nebulizer every 6 hours  carvedilol 6.25 milliGRAM(s) Oral every 12 hours  chlorhexidine 4% Liquid 1 Application(s) Topical daily  clopidogrel Tablet 75 milliGRAM(s) Oral daily  heparin  Injectable 5000 Unit(s) SubCutaneous every 8 hours  hydrALAZINE 25 milliGRAM(s) Oral three times a day  insulin lispro (HumaLOG) corrective regimen sliding scale   SubCutaneous Before meals and at bedtime  isosorbide   mononitrate ER Tablet (IMDUR) 30 milliGRAM(s) Oral daily  methocarbamol 500 milliGRAM(s) Oral two times a day  mirtazapine 7.5 milliGRAM(s) Oral daily  polyethylene glycol 3350 17 Gram(s) Oral every 12 hours  risperiDONE   Tablet 1 milliGRAM(s) Oral every 12 hours  senna 2 Tablet(s) Oral at bedtime  sevelamer carbonate 1600 milliGRAM(s) Oral three times a day with meals  simvastatin 40 milliGRAM(s) Oral at bedtime    MEDICATIONS  (PRN):  clonazePAM  Tablet 0.5 milliGRAM(s) Oral every 12 hours PRN Anxiety  guaiFENesin    Syrup 100 milliGRAM(s) Oral every 6 hours PRN Cough  oxycodone    5 mG/acetaminophen 325 mG 1 Tablet(s) Oral every 6 hours PRN Moderate Pain (4 - 6)      __________________________________________________  REVIEW OF SYSTEMS:    CONSTITUTIONAL: No fever,   EYES: no acute visual disturbances  NECK: No pain or stiffness  RESPIRATORY: No cough; No shortness of breath  CARDIOVASCULAR: No chest pain, no palpitations  GASTROINTESTINAL: No pain. No nausea or vomiting; No diarrhea   NEUROLOGICAL: No headache or numbness, no tremors  MUSCULOSKELETAL: No joint pain, left leg numbness  GENITOURINARY: no dysuria, no frequency, no hesitancy  PSYCHIATRY: no depression , no anxiety  ALL OTHER  ROS negative        Vital Signs Last 24 Hrs  T(C): 37.7 (05 Dec 2019 04:50), Max: 37.7 (05 Dec 2019 04:50)  T(F): 99.8 (05 Dec 2019 04:50), Max: 99.8 (05 Dec 2019 04:50)  HR: 90 (05 Dec 2019 11:38) (79 - 90)  BP: 147/73 (05 Dec 2019 11:38) (108/45 - 147/73)  BP(mean): --  RR: 18 (05 Dec 2019 04:50) (18 - 18)  SpO2: 98% (05 Dec 2019 04:50) (96% - 100%)    ________________________________________________  PHYSICAL EXAM:  GENERAL: Obese male   HEENT: Normocephalic;  conjunctivae and sclerae clear; moist mucous membranes;   NECK : supple  CHEST/LUNG: Clear to auscultation bilaterally with good air entry   HEART: S1 S2  regular; no murmurs, gallops or rubs  ABDOMEN: Soft, Nontender, Nondistended; Bowel sounds present  EXTREMITIES: no cyanosis; no edema; no calf tenderness  SKIN: warm and dry; no rash  NERVOUS SYSTEM:  Awake and alert; Oriented  to place, person and time ; unable to move left thigh and leg.  Decreased sensation.     _________________________________________________  LABS:                        8.9    9.29  )-----------( 301      ( 04 Dec 2019 14:16 )             31.2     12-04    133<L>  |  93<L>  |  78<H>  ----------------------------<  168<H>  4.6   |  29  |  7.64<H>    Ca    8.6      04 Dec 2019 14:16  Phos  7.6     12-04  Mg     2.4     12-04    TPro  7.4  /  Alb  2.5<L>  /  TBili  0.5  /  DBili  x   /  AST  7<L>  /  ALT  16  /  AlkPhos  210<H>  12-04        CAPILLARY BLOOD GLUCOSE      POCT Blood Glucose.: 138 mg/dL (05 Dec 2019 11:32)  POCT Blood Glucose.: 146 mg/dL (05 Dec 2019 07:40)  POCT Blood Glucose.: 151 mg/dL (04 Dec 2019 21:13)  POCT Blood Glucose.: 156 mg/dL (04 Dec 2019 16:41)        RADIOLOGY & ADDITIONAL TESTS:    Imaging Personally Reviewed:  YES/NO    Consultant(s) Notes Reviewed:   YES/ No    Care Discussed with Consultants :     Plan of care was discussed with patient and /or primary care giver; all questions and concerns were addressed and care was aligned with patient's wishes.

## 2019-12-06 LAB
ANION GAP SERPL CALC-SCNC: 8 MMOL/L — SIGNIFICANT CHANGE UP (ref 5–17)
BASOPHILS # BLD AUTO: 0.01 K/UL — SIGNIFICANT CHANGE UP (ref 0–0.2)
BASOPHILS NFR BLD AUTO: 0.1 % — SIGNIFICANT CHANGE UP (ref 0–2)
BUN SERPL-MCNC: 65 MG/DL — HIGH (ref 7–18)
CALCIUM SERPL-MCNC: 8.8 MG/DL — SIGNIFICANT CHANGE UP (ref 8.4–10.5)
CHLORIDE SERPL-SCNC: 94 MMOL/L — LOW (ref 96–108)
CO2 SERPL-SCNC: 31 MMOL/L — SIGNIFICANT CHANGE UP (ref 22–31)
CREAT SERPL-MCNC: 7.01 MG/DL — HIGH (ref 0.5–1.3)
CULTURE RESULTS: SIGNIFICANT CHANGE UP
CULTURE RESULTS: SIGNIFICANT CHANGE UP
EOSINOPHIL # BLD AUTO: 0.49 K/UL — SIGNIFICANT CHANGE UP (ref 0–0.5)
EOSINOPHIL NFR BLD AUTO: 3.8 % — SIGNIFICANT CHANGE UP (ref 0–6)
GLUCOSE BLDC GLUCOMTR-MCNC: 117 MG/DL — HIGH (ref 70–99)
GLUCOSE BLDC GLUCOMTR-MCNC: 133 MG/DL — HIGH (ref 70–99)
GLUCOSE BLDC GLUCOMTR-MCNC: 138 MG/DL — HIGH (ref 70–99)
GLUCOSE BLDC GLUCOMTR-MCNC: 140 MG/DL — HIGH (ref 70–99)
GLUCOSE SERPL-MCNC: 148 MG/DL — HIGH (ref 70–99)
HAV IGM SER-ACNC: SIGNIFICANT CHANGE UP
HBV CORE IGM SER-ACNC: SIGNIFICANT CHANGE UP
HBV SURFACE AG SER-ACNC: SIGNIFICANT CHANGE UP
HCT VFR BLD CALC: 29.7 % — LOW (ref 39–50)
HCV AB S/CO SERPL IA: 0.17 S/CO — SIGNIFICANT CHANGE UP (ref 0–0.99)
HCV AB SERPL-IMP: SIGNIFICANT CHANGE UP
HGB BLD-MCNC: 8.8 G/DL — LOW (ref 13–17)
IMM GRANULOCYTES NFR BLD AUTO: 0.6 % — SIGNIFICANT CHANGE UP (ref 0–1.5)
LYMPHOCYTES # BLD AUTO: 0.53 K/UL — LOW (ref 1–3.3)
LYMPHOCYTES # BLD AUTO: 4.1 % — LOW (ref 13–44)
MAGNESIUM SERPL-MCNC: 2.3 MG/DL — SIGNIFICANT CHANGE UP (ref 1.6–2.6)
MCHC RBC-ENTMCNC: 27.8 PG — SIGNIFICANT CHANGE UP (ref 27–34)
MCHC RBC-ENTMCNC: 29.6 GM/DL — LOW (ref 32–36)
MCV RBC AUTO: 93.7 FL — SIGNIFICANT CHANGE UP (ref 80–100)
MONOCYTES # BLD AUTO: 1.55 K/UL — HIGH (ref 0–0.9)
MONOCYTES NFR BLD AUTO: 12.1 % — SIGNIFICANT CHANGE UP (ref 2–14)
NEUTROPHILS # BLD AUTO: 10.14 K/UL — HIGH (ref 1.8–7.4)
NEUTROPHILS NFR BLD AUTO: 79.3 % — HIGH (ref 43–77)
NRBC # BLD: 0 /100 WBCS — SIGNIFICANT CHANGE UP (ref 0–0)
PHOSPHATE SERPL-MCNC: 6.2 MG/DL — HIGH (ref 2.5–4.5)
PLATELET # BLD AUTO: 282 K/UL — SIGNIFICANT CHANGE UP (ref 150–400)
POTASSIUM SERPL-MCNC: 4.8 MMOL/L — SIGNIFICANT CHANGE UP (ref 3.5–5.3)
POTASSIUM SERPL-SCNC: 4.8 MMOL/L — SIGNIFICANT CHANGE UP (ref 3.5–5.3)
RBC # BLD: 3.17 M/UL — LOW (ref 4.2–5.8)
RBC # FLD: 18.2 % — HIGH (ref 10.3–14.5)
SODIUM SERPL-SCNC: 133 MMOL/L — LOW (ref 135–145)
SPECIMEN SOURCE: SIGNIFICANT CHANGE UP
SPECIMEN SOURCE: SIGNIFICANT CHANGE UP
WBC # BLD: 12.8 K/UL — HIGH (ref 3.8–10.5)
WBC # FLD AUTO: 12.8 K/UL — HIGH (ref 3.8–10.5)

## 2019-12-06 PROCEDURE — 99232 SBSQ HOSP IP/OBS MODERATE 35: CPT | Mod: GC

## 2019-12-06 RX ORDER — BENZOCAINE AND MENTHOL 5; 1 G/100ML; G/100ML
1 LIQUID ORAL EVERY 4 HOURS
Refills: 0 | Status: DISCONTINUED | OUTPATIENT
Start: 2019-12-06 | End: 2019-12-13

## 2019-12-06 RX ORDER — ACETAMINOPHEN 500 MG
650 TABLET ORAL ONCE
Refills: 0 | Status: COMPLETED | OUTPATIENT
Start: 2019-12-06 | End: 2019-12-06

## 2019-12-06 RX ADMIN — METHOCARBAMOL 500 MILLIGRAM(S): 500 TABLET, FILM COATED ORAL at 05:22

## 2019-12-06 RX ADMIN — RISPERIDONE 1 MILLIGRAM(S): 4 TABLET ORAL at 05:22

## 2019-12-06 RX ADMIN — METHOCARBAMOL 500 MILLIGRAM(S): 500 TABLET, FILM COATED ORAL at 17:41

## 2019-12-06 RX ADMIN — OXYCODONE AND ACETAMINOPHEN 1 TABLET(S): 5; 325 TABLET ORAL at 14:01

## 2019-12-06 RX ADMIN — Medication 650 MILLIGRAM(S): at 12:00

## 2019-12-06 RX ADMIN — Medication 100 MILLIGRAM(S): at 01:22

## 2019-12-06 RX ADMIN — SIMVASTATIN 40 MILLIGRAM(S): 20 TABLET, FILM COATED ORAL at 21:12

## 2019-12-06 RX ADMIN — OXYCODONE AND ACETAMINOPHEN 1 TABLET(S): 5; 325 TABLET ORAL at 22:00

## 2019-12-06 RX ADMIN — CHLORHEXIDINE GLUCONATE 1 APPLICATION(S): 213 SOLUTION TOPICAL at 13:46

## 2019-12-06 RX ADMIN — Medication 25 MILLIGRAM(S): at 14:02

## 2019-12-06 RX ADMIN — OXYCODONE AND ACETAMINOPHEN 1 TABLET(S): 5; 325 TABLET ORAL at 16:13

## 2019-12-06 RX ADMIN — Medication 100 MILLIGRAM(S): at 18:46

## 2019-12-06 RX ADMIN — CLOPIDOGREL BISULFATE 75 MILLIGRAM(S): 75 TABLET, FILM COATED ORAL at 11:49

## 2019-12-06 RX ADMIN — HEPARIN SODIUM 5000 UNIT(S): 5000 INJECTION INTRAVENOUS; SUBCUTANEOUS at 21:12

## 2019-12-06 RX ADMIN — OXYCODONE AND ACETAMINOPHEN 1 TABLET(S): 5; 325 TABLET ORAL at 21:49

## 2019-12-06 RX ADMIN — POLYETHYLENE GLYCOL 3350 17 GRAM(S): 17 POWDER, FOR SOLUTION ORAL at 05:22

## 2019-12-06 RX ADMIN — POLYETHYLENE GLYCOL 3350 17 GRAM(S): 17 POWDER, FOR SOLUTION ORAL at 17:41

## 2019-12-06 RX ADMIN — CARVEDILOL PHOSPHATE 6.25 MILLIGRAM(S): 80 CAPSULE, EXTENDED RELEASE ORAL at 17:41

## 2019-12-06 RX ADMIN — RISPERIDONE 1 MILLIGRAM(S): 4 TABLET ORAL at 17:41

## 2019-12-06 RX ADMIN — SEVELAMER CARBONATE 1600 MILLIGRAM(S): 2400 POWDER, FOR SUSPENSION ORAL at 11:49

## 2019-12-06 RX ADMIN — MIRTAZAPINE 7.5 MILLIGRAM(S): 45 TABLET, ORALLY DISINTEGRATING ORAL at 11:49

## 2019-12-06 RX ADMIN — CARVEDILOL PHOSPHATE 6.25 MILLIGRAM(S): 80 CAPSULE, EXTENDED RELEASE ORAL at 05:22

## 2019-12-06 RX ADMIN — Medication 3 MILLILITER(S): at 14:00

## 2019-12-06 RX ADMIN — Medication 3 MILLILITER(S): at 08:31

## 2019-12-06 RX ADMIN — Medication 3 MILLILITER(S): at 20:53

## 2019-12-06 RX ADMIN — HEPARIN SODIUM 5000 UNIT(S): 5000 INJECTION INTRAVENOUS; SUBCUTANEOUS at 14:02

## 2019-12-06 RX ADMIN — Medication 25 MILLIGRAM(S): at 21:12

## 2019-12-06 RX ADMIN — ISOSORBIDE MONONITRATE 30 MILLIGRAM(S): 60 TABLET, EXTENDED RELEASE ORAL at 11:49

## 2019-12-06 RX ADMIN — SEVELAMER CARBONATE 1600 MILLIGRAM(S): 2400 POWDER, FOR SUSPENSION ORAL at 17:41

## 2019-12-06 RX ADMIN — Medication 25 MILLIGRAM(S): at 05:22

## 2019-12-06 RX ADMIN — HEPARIN SODIUM 5000 UNIT(S): 5000 INJECTION INTRAVENOUS; SUBCUTANEOUS at 05:22

## 2019-12-06 RX ADMIN — Medication 650 MILLIGRAM(S): at 14:02

## 2019-12-06 RX ADMIN — SEVELAMER CARBONATE 1600 MILLIGRAM(S): 2400 POWDER, FOR SUSPENSION ORAL at 08:39

## 2019-12-06 NOTE — PROGRESS NOTE ADULT - PROBLEM SELECTOR PLAN 1
cute respiratory failure 2/2 acute pulmonary edema 2/2 missed HD   - Patient refused nasal BIPAP; C/w NC  - does not have COPD, stable on nasal oxygen  - outpatient PFTs showed restrictive lung disease likely from his obesity  - C/w supplemental oxygen and bronchodilators for possible asthma  -pt also seems to have KRYSTIN given VBG showing chronic elevations CO2  -chronic cocaine use may also be contributing to the bronchospasm

## 2019-12-06 NOTE — PROGRESS NOTE ADULT - SUBJECTIVE AND OBJECTIVE BOX
PGY 1 Note discussed with supervising resident and primary attending    Patient is a 39y old  Male who presents with a chief complaint of acute pulmonary edema (05 Dec 2019 13:30)      INTERVAL HPI/OVERNIGHT EVENTS:  Patient was seen and examined at bedside , no new complains, left leg numbness is improved, patient is claustrophobic so MRI wasnt done, he can get MRI as outpatient. DC planning to rehab . Dialysis per nephro.      MEDICATIONS  (STANDING):  albuterol/ipratropium for Nebulization 3 milliLiter(s) Nebulizer every 6 hours  carvedilol 6.25 milliGRAM(s) Oral every 12 hours  chlorhexidine 4% Liquid 1 Application(s) Topical daily  clopidogrel Tablet 75 milliGRAM(s) Oral daily  heparin  Injectable 5000 Unit(s) SubCutaneous every 8 hours  hydrALAZINE 25 milliGRAM(s) Oral three times a day  insulin lispro (HumaLOG) corrective regimen sliding scale   SubCutaneous Before meals and at bedtime  isosorbide   mononitrate ER Tablet (IMDUR) 30 milliGRAM(s) Oral daily  methocarbamol 500 milliGRAM(s) Oral two times a day  mirtazapine 7.5 milliGRAM(s) Oral daily  polyethylene glycol 3350 17 Gram(s) Oral every 12 hours  risperiDONE   Tablet 1 milliGRAM(s) Oral every 12 hours  senna 2 Tablet(s) Oral at bedtime  sevelamer carbonate 1600 milliGRAM(s) Oral three times a day with meals  simvastatin 40 milliGRAM(s) Oral at bedtime    MEDICATIONS  (PRN):  clonazePAM  Tablet 0.5 milliGRAM(s) Oral every 12 hours PRN Anxiety  guaiFENesin    Syrup 100 milliGRAM(s) Oral every 6 hours PRN Cough  oxycodone    5 mG/acetaminophen 325 mG 1 Tablet(s) Oral every 6 hours PRN Moderate Pain (4 - 6)      __________________________________________________  REVIEW OF SYSTEMS:    CONSTITUTIONAL: No fever,   EYES: no acute visual disturbances  NECK: No pain or stiffness  RESPIRATORY: No cough; No shortness of breath  CARDIOVASCULAR: No chest pain, no palpitations  GASTROINTESTINAL: No pain. No nausea or vomiting; No diarrhea   NEUROLOGICAL: No headache or numbness, no tremors  MUSCULOSKELETAL: No joint pain, no muscle pain  GENITOURINARY: no dysuria, no frequency, no hesitancy  PSYCHIATRY: no depression , no anxiety  ALL OTHER  ROS negative        Vital Signs Last 24 Hrs  T(C): 37.1 (06 Dec 2019 05:05), Max: 37.4 (05 Dec 2019 14:44)  T(F): 98.8 (06 Dec 2019 05:05), Max: 99.3 (05 Dec 2019 14:44)  HR: 89 (06 Dec 2019 05:05) (81 - 95)  BP: 138/76 (06 Dec 2019 05:05) (106/52 - 147/73)  BP(mean): --  RR: 18 (06 Dec 2019 05:05) (17 - 18)  SpO2: 100% (06 Dec 2019 05:05) (99% - 100%)    ________________________________________________  PHYSICAL EXAM:  GENERAL: obese male lying comfortably   HEENT: Normocephalic;  conjunctivae and sclerae clear; moist mucous membranes;   NECK : supple  CHEST/LUNG: Clear to auscultation bilaterally with good air entry , saturating well on NC  HEART: S1 S2  regular; no murmurs, gallops or rubs  ABDOMEN: Soft, Nontender, distended; Bowel sounds present  EXTREMITIES: no cyanosis; no edema; no calf tenderness  SKIN: warm and dry; no rash  NERVOUS SYSTEM:  Awake and alert; Oriented  to place, person and time ; no new deficits    _________________________________________________  LABS:                        8.8    12.80 )-----------( 282      ( 06 Dec 2019 05:43 )             29.7     12-06    133<L>  |  94<L>  |  65<H>  ----------------------------<  148<H>  4.8   |  31  |  7.01<H>    Ca    8.8      06 Dec 2019 05:43  Phos  6.2     12-06  Mg     2.3     12-06    TPro  7.4  /  Alb  2.5<L>  /  TBili  0.5  /  DBili  x   /  AST  7<L>  /  ALT  16  /  AlkPhos  210<H>  12-04        CAPILLARY BLOOD GLUCOSE      POCT Blood Glucose.: 133 mg/dL (06 Dec 2019 08:23)  POCT Blood Glucose.: 112 mg/dL (05 Dec 2019 21:50)  POCT Blood Glucose.: 101 mg/dL (05 Dec 2019 21:00)  POCT Blood Glucose.: 115 mg/dL (05 Dec 2019 16:58)  POCT Blood Glucose.: 138 mg/dL (05 Dec 2019 11:32)        RADIOLOGY & ADDITIONAL TESTS:    Imaging Personally Reviewed:  YES/NO    Consultant(s) Notes Reviewed:   YES/ No    Care Discussed with Consultants :     Plan of care was discussed with patient and /or primary care giver; all questions and concerns were addressed and care was aligned with patient's wishes.

## 2019-12-06 NOTE — PROGRESS NOTE ADULT - SUBJECTIVE AND OBJECTIVE BOX
[facility] : Progress Note :: [phone], (office [phone]),   Dr La / Dr Hui / Dr Barber / Dr Villalobos / Dr Hang CASTELLANO / Dr Mehta / Dr Sanchez / Dr Alber dumont  _____________________________________________________________________________________________    s/p HD yesterday.    aspirin (Short breath)  aspirin (Swelling)  fish (Unknown)  penicillin (Short breath)  penicillins (Swelling)  shellfish (Unknown)    Hospital Medications:   MEDICATIONS  (STANDING):  albuterol/ipratropium for Nebulization 3 milliLiter(s) Nebulizer every 6 hours  carvedilol 6.25 milliGRAM(s) Oral every 12 hours  chlorhexidine 4% Liquid 1 Application(s) Topical daily  clopidogrel Tablet 75 milliGRAM(s) Oral daily  heparin  Injectable 5000 Unit(s) SubCutaneous every 8 hours  hydrALAZINE 25 milliGRAM(s) Oral three times a day  insulin lispro (HumaLOG) corrective regimen sliding scale   SubCutaneous Before meals and at bedtime  isosorbide   mononitrate ER Tablet (IMDUR) 30 milliGRAM(s) Oral daily  methocarbamol 500 milliGRAM(s) Oral two times a day  mirtazapine 7.5 milliGRAM(s) Oral daily  polyethylene glycol 3350 17 Gram(s) Oral every 12 hours  risperiDONE   Tablet 1 milliGRAM(s) Oral every 12 hours  senna 2 Tablet(s) Oral at bedtime  sevelamer carbonate 1600 milliGRAM(s) Oral three times a day with meals  simvastatin 40 milliGRAM(s) Oral at bedtime      VITALS:  T(F): 99.4 (12-06-19 @ 17:19), Max: 100.2 (12-06-19 @ 11:44)  HR: 85 (12-06-19 @ 14:20)  BP: 135/62 (12-06-19 @ 13:35)  RR: 18 (12-06-19 @ 13:35)  SpO2: 99% (12-06-19 @ 14:20)  Wt(kg): --      PHYSICAL EXAM:  Constitutional: NAD  HEENT: anicteric sclera, oropharynx clear.  Neck: No JVD  Respiratory: CTAB, no wheezes, rales or rhonchi  Cardiovascular: S1, S2, RRR  Gastrointestinal: BS+, soft, NT/ND  Extremities: No cyanosis or clubbing. No peripheral edema  Neurological: A/O x 3, no focal deficits  Vascular Access: AV access with thrill and bruit    LABS:  12-06    133<L>  |  94<L>  |  65<H>  ----------------------------<  148<H>  4.8   |  31  |  7.01<H>    Ca    8.8      06 Dec 2019 05:43  Phos  6.2     12-06  Mg     2.3     12-06      Creatinine Trend: 7.01 <--, 8.69 <--, 7.64 <--, 8.17 <--, 9.55 <--, 8.14 <--, 10.70 <--                        8.8    12.80 )-----------( 282      ( 06 Dec 2019 05:43 )             29.7     Urine Studies:      RADIOLOGY & ADDITIONAL STUDIES:

## 2019-12-06 NOTE — PROGRESS NOTE ADULT - ASSESSMENT
# ESRD. HD in AM with UF as tolerated  #hyperkalemia. hyponatremia- fluid restriction 1.2 L/day  #HTN controlled  # Chronic Kidney Disease Stage . elevated phos. Cont sevelamer 1600mg three times per day AC  # anemia of CKD. Procrit on HD   D/C PLANNING

## 2019-12-06 NOTE — DIETITIAN INITIAL EVALUATION ADULT. - OTHER INFO
Pt visited. Pt seen for LOS. Mother at bedside. Pt  Reports H/O HD x ~ 5 yrs. Appetite Good. Po tolerated. Food choices obtained. Pt is Morbidly obese.

## 2019-12-06 NOTE — CHART NOTE - NSCHARTNOTEFT_GEN_A_CORE
PAtient spiked a fever of 100.2, we ordered blood cultures , but patient refused, next blood cultures will be done with next labs. currently he is no having fever. vital signs stable.

## 2019-12-06 NOTE — PROGRESS NOTE ADULT - ASSESSMENT
40 y/o morbidly obese male from home PMH ESRD (on MWF HD via LUE AVF at [facility] w/ Dr La), HTN, IDDM, Substance abuse, COPD (former smoker, on home oxygen 2L), Bipolar disorder, and Schizophrenia p/w shortness of breath x 1 day s/p missed HD - admitted to the ICU for acute respiratory failure 2/2 acute pulmonary edema 2/2 missed HD session requiring nasal BIPAP (given clasutrophobia)

## 2019-12-07 LAB
ANION GAP SERPL CALC-SCNC: 11 MMOL/L — SIGNIFICANT CHANGE UP (ref 5–17)
BASOPHILS # BLD AUTO: 0.02 K/UL — SIGNIFICANT CHANGE UP (ref 0–0.2)
BASOPHILS NFR BLD AUTO: 0.2 % — SIGNIFICANT CHANGE UP (ref 0–2)
BUN SERPL-MCNC: 83 MG/DL — HIGH (ref 7–18)
CALCIUM SERPL-MCNC: 9.2 MG/DL — SIGNIFICANT CHANGE UP (ref 8.4–10.5)
CHLORIDE SERPL-SCNC: 94 MMOL/L — LOW (ref 96–108)
CO2 SERPL-SCNC: 28 MMOL/L — SIGNIFICANT CHANGE UP (ref 22–31)
CREAT SERPL-MCNC: 8.46 MG/DL — HIGH (ref 0.5–1.3)
EOSINOPHIL # BLD AUTO: 0.61 K/UL — HIGH (ref 0–0.5)
EOSINOPHIL NFR BLD AUTO: 5.4 % — SIGNIFICANT CHANGE UP (ref 0–6)
GLUCOSE BLDC GLUCOMTR-MCNC: 103 MG/DL — HIGH (ref 70–99)
GLUCOSE BLDC GLUCOMTR-MCNC: 109 MG/DL — HIGH (ref 70–99)
GLUCOSE BLDC GLUCOMTR-MCNC: 129 MG/DL — HIGH (ref 70–99)
GLUCOSE BLDC GLUCOMTR-MCNC: 155 MG/DL — HIGH (ref 70–99)
GLUCOSE SERPL-MCNC: 110 MG/DL — HIGH (ref 70–99)
HCT VFR BLD CALC: 30.1 % — LOW (ref 39–50)
HGB BLD-MCNC: 8.7 G/DL — LOW (ref 13–17)
IMM GRANULOCYTES NFR BLD AUTO: 0.5 % — SIGNIFICANT CHANGE UP (ref 0–1.5)
LYMPHOCYTES # BLD AUTO: 0.76 K/UL — LOW (ref 1–3.3)
LYMPHOCYTES # BLD AUTO: 6.7 % — LOW (ref 13–44)
MAGNESIUM SERPL-MCNC: 2.4 MG/DL — SIGNIFICANT CHANGE UP (ref 1.6–2.6)
MCHC RBC-ENTMCNC: 27.4 PG — SIGNIFICANT CHANGE UP (ref 27–34)
MCHC RBC-ENTMCNC: 28.9 GM/DL — LOW (ref 32–36)
MCV RBC AUTO: 95 FL — SIGNIFICANT CHANGE UP (ref 80–100)
MONOCYTES # BLD AUTO: 1.23 K/UL — HIGH (ref 0–0.9)
MONOCYTES NFR BLD AUTO: 10.8 % — SIGNIFICANT CHANGE UP (ref 2–14)
NEUTROPHILS # BLD AUTO: 8.71 K/UL — HIGH (ref 1.8–7.4)
NEUTROPHILS NFR BLD AUTO: 76.4 % — SIGNIFICANT CHANGE UP (ref 43–77)
NRBC # BLD: 0 /100 WBCS — SIGNIFICANT CHANGE UP (ref 0–0)
PHOSPHATE SERPL-MCNC: 7.7 MG/DL — HIGH (ref 2.5–4.5)
PLATELET # BLD AUTO: 308 K/UL — SIGNIFICANT CHANGE UP (ref 150–400)
POTASSIUM SERPL-MCNC: 4.8 MMOL/L — SIGNIFICANT CHANGE UP (ref 3.5–5.3)
POTASSIUM SERPL-SCNC: 4.8 MMOL/L — SIGNIFICANT CHANGE UP (ref 3.5–5.3)
RBC # BLD: 3.17 M/UL — LOW (ref 4.2–5.8)
RBC # FLD: 18.3 % — HIGH (ref 10.3–14.5)
SODIUM SERPL-SCNC: 133 MMOL/L — LOW (ref 135–145)
WBC # BLD: 11.39 K/UL — HIGH (ref 3.8–10.5)
WBC # FLD AUTO: 11.39 K/UL — HIGH (ref 3.8–10.5)

## 2019-12-07 PROCEDURE — 99233 SBSQ HOSP IP/OBS HIGH 50: CPT

## 2019-12-07 RX ADMIN — Medication 25 MILLIGRAM(S): at 21:58

## 2019-12-07 RX ADMIN — SENNA PLUS 2 TABLET(S): 8.6 TABLET ORAL at 21:58

## 2019-12-07 RX ADMIN — RISPERIDONE 1 MILLIGRAM(S): 4 TABLET ORAL at 06:20

## 2019-12-07 RX ADMIN — Medication 100 MILLIGRAM(S): at 17:28

## 2019-12-07 RX ADMIN — Medication 25 MILLIGRAM(S): at 13:32

## 2019-12-07 RX ADMIN — METHOCARBAMOL 500 MILLIGRAM(S): 500 TABLET, FILM COATED ORAL at 06:19

## 2019-12-07 RX ADMIN — OXYCODONE AND ACETAMINOPHEN 1 TABLET(S): 5; 325 TABLET ORAL at 08:05

## 2019-12-07 RX ADMIN — BENZOCAINE AND MENTHOL 1 LOZENGE: 5; 1 LIQUID ORAL at 13:32

## 2019-12-07 RX ADMIN — SIMVASTATIN 40 MILLIGRAM(S): 20 TABLET, FILM COATED ORAL at 21:58

## 2019-12-07 RX ADMIN — BENZOCAINE AND MENTHOL 1 LOZENGE: 5; 1 LIQUID ORAL at 17:28

## 2019-12-07 RX ADMIN — HEPARIN SODIUM 5000 UNIT(S): 5000 INJECTION INTRAVENOUS; SUBCUTANEOUS at 13:32

## 2019-12-07 RX ADMIN — METHOCARBAMOL 500 MILLIGRAM(S): 500 TABLET, FILM COATED ORAL at 17:28

## 2019-12-07 RX ADMIN — BENZOCAINE AND MENTHOL 1 LOZENGE: 5; 1 LIQUID ORAL at 21:58

## 2019-12-07 RX ADMIN — BENZOCAINE AND MENTHOL 1 LOZENGE: 5; 1 LIQUID ORAL at 00:17

## 2019-12-07 RX ADMIN — CARVEDILOL PHOSPHATE 6.25 MILLIGRAM(S): 80 CAPSULE, EXTENDED RELEASE ORAL at 17:35

## 2019-12-07 RX ADMIN — OXYCODONE AND ACETAMINOPHEN 1 TABLET(S): 5; 325 TABLET ORAL at 15:30

## 2019-12-07 RX ADMIN — HEPARIN SODIUM 5000 UNIT(S): 5000 INJECTION INTRAVENOUS; SUBCUTANEOUS at 06:20

## 2019-12-07 RX ADMIN — Medication 25 MILLIGRAM(S): at 06:19

## 2019-12-07 RX ADMIN — POLYETHYLENE GLYCOL 3350 17 GRAM(S): 17 POWDER, FOR SOLUTION ORAL at 06:20

## 2019-12-07 RX ADMIN — Medication 3 MILLILITER(S): at 20:49

## 2019-12-07 RX ADMIN — MIRTAZAPINE 7.5 MILLIGRAM(S): 45 TABLET, ORALLY DISINTEGRATING ORAL at 13:32

## 2019-12-07 RX ADMIN — Medication 100 MILLIGRAM(S): at 06:20

## 2019-12-07 RX ADMIN — CARVEDILOL PHOSPHATE 6.25 MILLIGRAM(S): 80 CAPSULE, EXTENDED RELEASE ORAL at 06:19

## 2019-12-07 RX ADMIN — Medication 3 MILLILITER(S): at 02:46

## 2019-12-07 RX ADMIN — ISOSORBIDE MONONITRATE 30 MILLIGRAM(S): 60 TABLET, EXTENDED RELEASE ORAL at 13:31

## 2019-12-07 RX ADMIN — HEPARIN SODIUM 5000 UNIT(S): 5000 INJECTION INTRAVENOUS; SUBCUTANEOUS at 22:00

## 2019-12-07 RX ADMIN — Medication 3 MILLILITER(S): at 15:34

## 2019-12-07 RX ADMIN — CLOPIDOGREL BISULFATE 75 MILLIGRAM(S): 75 TABLET, FILM COATED ORAL at 13:31

## 2019-12-07 RX ADMIN — Medication 1: at 21:59

## 2019-12-07 RX ADMIN — CHLORHEXIDINE GLUCONATE 1 APPLICATION(S): 213 SOLUTION TOPICAL at 17:36

## 2019-12-07 RX ADMIN — OXYCODONE AND ACETAMINOPHEN 1 TABLET(S): 5; 325 TABLET ORAL at 14:26

## 2019-12-07 RX ADMIN — RISPERIDONE 1 MILLIGRAM(S): 4 TABLET ORAL at 17:28

## 2019-12-07 RX ADMIN — Medication 100 MILLIGRAM(S): at 00:53

## 2019-12-07 RX ADMIN — BENZOCAINE AND MENTHOL 1 LOZENGE: 5; 1 LIQUID ORAL at 06:21

## 2019-12-07 NOTE — PROGRESS NOTE ADULT - SUBJECTIVE AND OBJECTIVE BOX
[facility] : Progress Note :: [phone], (office [phone]),   Dr La / Dr Hui / Dr Barber / Dr Villalobos / Dr Hang CASTELLANO / Dr Mehta / Dr Sanchez / Dr Alber dumont    seen on HD     aspirin (Short breath)  aspirin (Swelling)  fish (Unknown)  penicillin (Short breath)  penicillins (Swelling)  shellfish (Unknown)    Hospital Medications:   MEDICATIONS  (STANDING):  albuterol/ipratropium for Nebulization 3 milliLiter(s) Nebulizer every 6 hours  benzocaine 15 mG/menthol 3.6 mG (Sugar-Free) Lozenge 1 Lozenge Oral every 4 hours  carvedilol 6.25 milliGRAM(s) Oral every 12 hours  chlorhexidine 4% Liquid 1 Application(s) Topical daily  clopidogrel Tablet 75 milliGRAM(s) Oral daily  heparin  Injectable 5000 Unit(s) SubCutaneous every 8 hours  hydrALAZINE 25 milliGRAM(s) Oral three times a day  insulin lispro (HumaLOG) corrective regimen sliding scale   SubCutaneous Before meals and at bedtime  isosorbide   mononitrate ER Tablet (IMDUR) 30 milliGRAM(s) Oral daily  methocarbamol 500 milliGRAM(s) Oral two times a day  mirtazapine 7.5 milliGRAM(s) Oral daily  polyethylene glycol 3350 17 Gram(s) Oral every 12 hours  risperiDONE   Tablet 1 milliGRAM(s) Oral every 12 hours  senna 2 Tablet(s) Oral at bedtime  sevelamer carbonate 1600 milliGRAM(s) Oral three times a day with meals  simvastatin 40 milliGRAM(s) Oral at bedtime        VITALS:  T(F): 98.6 (12-07-19 @ 13:00), Max: 98.9 (12-06-19 @ 20:53)  HR: 88 (12-07-19 @ 17:33)  BP: 137/65 (12-07-19 @ 17:33)  RR: 16 (12-07-19 @ 13:00)  SpO2: 100% (12-07-19 @ 13:00)  Wt(kg): --      PHYSICAL EXAM:  Constitutional: NAD  HEENT: anicteric sclera, oropharynx clear.  Neck: No JVD  Respiratory: CTAB, no wheezes, rales or rhonchi  Cardiovascular: S1, S2, RRR  Gastrointestinal: BS+, soft, NT/ND  Extremities:  No peripheral edema  Neurological: A/O x 3, no focal deficits  Vascular Access: AVF cannulated.    LABS:  12-07    133<L>  |  94<L>  |  83<H>  ----------------------------<  110<H>  4.8   |  28  |  8.46<H>    Ca    9.2      07 Dec 2019 08:53  Phos  7.7     12-07  Mg     2.4     12-07      Creatinine Trend: 8.46 <--, 7.01 <--, 8.69 <--, 7.64 <--, 8.17 <--, 9.55 <--, 8.14 <--, 10.70 <--                        8.7    11.39 )-----------( 308      ( 07 Dec 2019 08:53 )             30.1     Urine Studies:      RADIOLOGY & ADDITIONAL STUDIES:

## 2019-12-07 NOTE — PROGRESS NOTE ADULT - SUBJECTIVE AND OBJECTIVE BOX
Patient is a 39y old  Male who presents with a chief complaint of acute pulmonary edema (07 Dec 2019 17:47)      HPI:  38 y/o morbidly obese male from home PMH ESRD (on MWF HD via LUE AVF at [facility] w/ Dr La), HTN, IDDM, Substance abuse, COPD (former smoker, on home oxygen 2L), Bipolar disorder, and Schizophrenia p/w shortness of breath x 1 day s/p missed HD - HPI limited given patient's moderate work of breathing. Patient states he was suppose to go for HD Friday (schedule was changed 2/2 Thanksgiving week but he was never picked up) and as such developed dyspnea, cough, and work of breathing. In the ED patient seen in acute distress initially placed on standard face mask given but patient refused 2/2 claustrophobia. As per the ED provider patient was not hypoxia. Patient has chronic complaints of pain of his lower extremities - he is actively seeking for a pain management specialist. Patient seen and examined at bedside severely anxious with increased respiratory rate. (30 Nov 2019 21:33)    OPPQQRST    ROS:    PAST MEDICAL & SURGICAL HISTORY:  COPD (chronic obstructive pulmonary disease)  Migraine  HTN (hypertension)  DM (diabetes mellitus)  Bipolar disorder  Schizophrenia  ESRD on dialysis  Morbid obesity with BMI of 40.0-44.9, adult  H/O hernia repair    MEDICATIONS  (STANDING):  albuterol/ipratropium for Nebulization 3 milliLiter(s) Nebulizer every 6 hours  benzocaine 15 mG/menthol 3.6 mG (Sugar-Free) Lozenge 1 Lozenge Oral every 4 hours  carvedilol 6.25 milliGRAM(s) Oral every 12 hours  chlorhexidine 4% Liquid 1 Application(s) Topical daily  clopidogrel Tablet 75 milliGRAM(s) Oral daily  heparin  Injectable 5000 Unit(s) SubCutaneous every 8 hours  hydrALAZINE 25 milliGRAM(s) Oral three times a day  insulin lispro (HumaLOG) corrective regimen sliding scale   SubCutaneous Before meals and at bedtime  isosorbide   mononitrate ER Tablet (IMDUR) 30 milliGRAM(s) Oral daily  methocarbamol 500 milliGRAM(s) Oral two times a day  mirtazapine 7.5 milliGRAM(s) Oral daily  polyethylene glycol 3350 17 Gram(s) Oral every 12 hours  risperiDONE   Tablet 1 milliGRAM(s) Oral every 12 hours  senna 2 Tablet(s) Oral at bedtime  sevelamer carbonate 1600 milliGRAM(s) Oral three times a day with meals  simvastatin 40 milliGRAM(s) Oral at bedtime    MEDICATIONS  (PRN):  clonazePAM  Tablet 0.5 milliGRAM(s) Oral every 12 hours PRN Anxiety  guaiFENesin    Syrup 100 milliGRAM(s) Oral every 6 hours PRN Cough  oxycodone    5 mG/acetaminophen 325 mG 1 Tablet(s) Oral every 6 hours PRN Moderate Pain (4 - 6)      EXAM:  Vital Signs Last 24 Hrs  T(C): 37.6 (07 Dec 2019 20:44), Max: 37.6 (07 Dec 2019 20:44)  T(F): 99.7 (07 Dec 2019 20:44), Max: 99.7 (07 Dec 2019 20:44)  HR: 86 (07 Dec 2019 20:44) (71 - 88)  BP: 111/47 (07 Dec 2019 20:44) (111/47 - 139/67)  BP(mean): --  RR: 19 (07 Dec 2019 20:44) (16 - 19)  SpO2: 98% (07 Dec 2019 20:44) (98% - 100%)    12-07 @ 07:01  -  12-07 @ 23:49  --------------------------------------------------------  IN: 600 mL / OUT: 350 mL / NET: 250 mL        PHYSICAL EXAM:  Constitutional: awake  Neck: supple  Respiratory: bilaterally clear to auscultation, no wheezing, no rhonchi, no crackles, no decreased air entry  Cardiovascular: s1s2, rrr, no murmurs.   Gastrointestinal: soft, non tender, +bowel sounds, no rebound, no guarding.   Extremities: no edema.   Neurological: alert and oriented to person, date and place.   Skin: intact no rash, warm to touch.   Musculoskeletal: moves all 4 extremities  Psychiatric: appropriate affect.     LABS:                              8.7    11.39 )-----------( 308      ( 07 Dec 2019 08:53 )             30.1     12-07    133<L>  |  94<L>  |  83<H>  ----------------------------<  110<H>  4.8   |  28  |  8.46<H>    Ca    9.2      07 Dec 2019 08:53  Phos  7.7     12-07  Mg     2.4     12-07 Patient is a 39y old  Male who presents with a chief complaint of acute pulmonary edema (07 Dec 2019 17:47)    HPI:  40 y/o morbidly obese male from home PMH ESRD (on MWF HD via LUE AVF at [facility] w/ Dr La), HTN, IDDM, Substance abuse, COPD (former smoker, on home oxygen 2L), Bipolar disorder, and Schizophrenia p/w shortness of breath x 1 day s/p missed HD - HPI limited given patient's moderate work of breathing. Patient states he was suppose to go for HD Friday (schedule was changed 2/2 Thanksgiving week but he was never picked up) and as such developed dyspnea, cough, and work of breathing. In the ED patient seen in acute distress initially placed on standard face mask given but patient refused 2/2 claustrophobia. As per the ED provider patient was not hypoxia. Patient has chronic complaints of pain of his lower extremities - he is actively seeking for a pain management specialist. Patient seen and examined at bedside severely anxious with increased respiratory rate. (30 Nov 2019 21:33)    Today: Pt notes that he has increasing swelling to his legs. No sob. Pt is sp HD on 12/7/19. Next scheduled for 12/9/19. No fevers. He is requesting meal supplements as he does not want to eat the food here at the hospital.     PAST MEDICAL & SURGICAL HISTORY:  COPD (chronic obstructive pulmonary disease)  Migraine  HTN (hypertension)  DM (diabetes mellitus)  Bipolar disorder  Schizophrenia  ESRD on dialysis  Morbid obesity with BMI of 40.0-44.9, adult  H/O hernia repair    MEDICATIONS  (STANDING):  albuterol/ipratropium for Nebulization 3 milliLiter(s) Nebulizer every 6 hours  benzocaine 15 mG/menthol 3.6 mG (Sugar-Free) Lozenge 1 Lozenge Oral every 4 hours  carvedilol 6.25 milliGRAM(s) Oral every 12 hours  chlorhexidine 4% Liquid 1 Application(s) Topical daily  clopidogrel Tablet 75 milliGRAM(s) Oral daily  heparin  Injectable 5000 Unit(s) SubCutaneous every 8 hours  hydrALAZINE 25 milliGRAM(s) Oral three times a day  insulin lispro (HumaLOG) corrective regimen sliding scale   SubCutaneous Before meals and at bedtime  isosorbide   mononitrate ER Tablet (IMDUR) 30 milliGRAM(s) Oral daily  methocarbamol 500 milliGRAM(s) Oral two times a day  mirtazapine 7.5 milliGRAM(s) Oral daily  polyethylene glycol 3350 17 Gram(s) Oral every 12 hours  risperiDONE   Tablet 1 milliGRAM(s) Oral every 12 hours  senna 2 Tablet(s) Oral at bedtime  sevelamer carbonate 1600 milliGRAM(s) Oral three times a day with meals  simvastatin 40 milliGRAM(s) Oral at bedtime    MEDICATIONS  (PRN):  clonazePAM  Tablet 0.5 milliGRAM(s) Oral every 12 hours PRN Anxiety  guaiFENesin    Syrup 100 milliGRAM(s) Oral every 6 hours PRN Cough  oxycodone    5 mG/acetaminophen 325 mG 1 Tablet(s) Oral every 6 hours PRN Moderate Pain (4 - 6)    EXAM:  Vital Signs Last 24 Hrs  T(C): 37.6 (07 Dec 2019 20:44), Max: 37.6 (07 Dec 2019 20:44)  T(F): 99.7 (07 Dec 2019 20:44), Max: 99.7 (07 Dec 2019 20:44)  HR: 86 (07 Dec 2019 20:44) (71 - 88)  BP: 111/47 (07 Dec 2019 20:44) (111/47 - 139/67)  BP(mean): --  RR: 19 (07 Dec 2019 20:44) (16 - 19)  SpO2: 98% (07 Dec 2019 20:44) (98% - 100%)    12-07 @ 07:01  -  12-07 @ 23:49  --------------------------------------------------------  IN: 600 mL / OUT: 350 mL / NET: 250 mL    PHYSICAL EXAM:  Constitutional: awake, Nad  Neck: supple  Respiratory: bilaterally clear to auscultation  Cardiovascular: s1s2, rrr, no murmurs.   Gastrointestinal: soft, non tender, +bowel sounds, no rebound, no guarding.   Extremities: + pitting lower ext edema. LUE fistula site.   Neurological: alert and oriented to person, date and place.   Skin: warm to touch.   Musculoskeletal: moves all 4 extremities  Psychiatric: appropriate affect.     LABS:                        8.7    11.39 )-----------( 308      ( 07 Dec 2019 08:53 )             30.1     12-07    133<L>  |  94<L>  |  83<H>  ----------------------------<  110<H>  4.8   |  28  |  8.46<H>    Ca    9.2      07 Dec 2019 08:53  Phos  7.7     12-07  Mg     2.4     12-07

## 2019-12-07 NOTE — PROGRESS NOTE ADULT - ASSESSMENT
# ESRD. HD  with UF as tolerated  #HTN controlled  #hyponatremia from hypervolemia. Control of fluid intake re-inforced  # Chronic Kidney Disease Stage . elevated phos. Cont sevelamer 1600mg three times per day AC  # anemia of CKD. Procrit on HD   d/c planning

## 2019-12-07 NOTE — PROGRESS NOTE ADULT - ASSESSMENT
Urology Daily Progress Note    SUBJECTIVE:  Pt seen and examined, and is resting comfortably in bed. No acute events overnight.  Pt has remained afebrile since 100.6 in ED.  Has no complaints other than gas pain.      OBJECTIVE:  Vital Signs Last 24 Hrs  T(C): 36.6 (18 Aug 2018 10:49), Max: 38.1 (17 Aug 2018 15:53)  T(F): 97.9 (18 Aug 2018 10:49), Max: 100.6 (17 Aug 2018 15:53)  HR: 83 (18 Aug 2018 10:49) (79 - 106)  BP: 145/73 (18 Aug 2018 10:49) (122/69 - 145/73)  BP(mean): --  RR: 16 (18 Aug 2018 10:49) (16 - 18)  SpO2: 97% (18 Aug 2018 10:49) (94% - 98%)    I&O's Detail    17 Aug 2018 07:01  -  18 Aug 2018 07:00  --------------------------------------------------------  IN:    Oral Fluid: 240 mL    sodium chloride 0.9%.: 75 mL  Total IN: 315 mL    OUT:    Indwelling Catheter - Urethral: 1400 mL    Intermittent Catheterization - Urethral: 900 mL  Total OUT: 2300 mL    Total NET: -1985 mL        Exam:  GEN: A&Ox3, NAD  HEENT: atraumatic, normocephalic  CV: no JVD  RESP: no increased work of breathing, no use of accessory muscles  GI/ABD: soft, non-tender, non-distended, no rebound or guarding  : Barbour draining clear yellow urine  EXTREMITIES: warm, pink, well-perfused                        9.8    9.2   )-----------( 199      ( 18 Aug 2018 07:15 )             29.8       08-18    130<L>  |  95<L>  |  25<H>  ----------------------------<  248<H>  4.5   |  22  |  1.34<H>    Ca    8.6      18 Aug 2018 07:15  Mg     1.8     08-17        PT/INR - ( 17 Aug 2018 11:38 )   PT: 20.7 sec;   INR: 1.89 ratio         PTT - ( 17 Aug 2018 11:38 )  PTT:31.6 sec    ASSESSMENT:  84M with hx of prostate ca s/p brachy s/p TURP and TUR of bladder neck contracture with multiple failed TOV with barbour placed in ER under cystoscopic guidance, found to have prostatic abscess and ?masses in prostate, planning for IR drainage and bx    PLAN:    - Diet: regular  - Hold AC, will contact cardiologist  - Continue Barbour  - Bowel regimen  - Continue Vanc/Zosyn  - IR to place drain and bx masses Monday  - Possibly will need ID consult after IR drain placed  - Activity: OOB/ambulation  - Incentive spirometry  - DVT prophylaxis          Maddie Wall MD PGY-2 40 y/o morbidly obese male from home PMH ESRD (on MWF HD via LUE AVF at [facility] w/ Dr La), HTN, IDDM, Substance abuse, former smoker, on home oxygen 2L, Bipolar disorder, and Schizophrenia p/w shortness of breath x 1 day s/p missed HD - admitted to the ICU for acute respiratory failure 2/2 acute pulmonary edema requiring nasal BIPAP (given claustrophobia    ·  Problem: Pulmonary edema.  Plan: Acute respiratory failure 2/2 acute pulmonary edema 2/2 missed HD.   Currently stable on nasal canula. Cw HD as per Nephro.   Pt to adhere to a renal diet.     -SIRS: Low grade temp 100.2.   blood cultures done during HD. R/o bacteremia. Will monitor for next 48 hours.     ·  Problem: DM (diabetes mellitus).  Plan: IDDM prior A1c 5.3  - Goal  -180; outpatient regimen 15 U premeal but prior admission managed w/ ISS alone  - C/w ISS and FS ac/hs.     ·  Problem: HTN (hypertension).  Plan: c/w Coreg; resumed Hydralazine and imdur .  monitor blood pressure.     ·  Problem: ESRD on dialysis.   HD as per renal. Renal diet. Pt is non compliant with his diet and family is bringing outside food.     ·  Problem: Leg weakness.  Plan: -improved .  -Mri wasn't done because the patient is claustrophobic.  -neuro Dr Mane.       Problem: Morbid obesity with BMI of 40.0-44.9, adult. Plan: -Patient is non compliant with diet and exercise.    ·  Problem: Prophylactic measure.  Plan: Patient is on heparin for DVT prophylaxis.                                       Dispo: will transfer to [facility], when accepted.

## 2019-12-08 LAB
ANION GAP SERPL CALC-SCNC: 11 MMOL/L — SIGNIFICANT CHANGE UP (ref 5–17)
BASOPHILS # BLD AUTO: 0.02 K/UL — SIGNIFICANT CHANGE UP (ref 0–0.2)
BASOPHILS NFR BLD AUTO: 0.2 % — SIGNIFICANT CHANGE UP (ref 0–2)
BUN SERPL-MCNC: 70 MG/DL — HIGH (ref 7–18)
CALCIUM SERPL-MCNC: 9 MG/DL — SIGNIFICANT CHANGE UP (ref 8.4–10.5)
CHLORIDE SERPL-SCNC: 96 MMOL/L — SIGNIFICANT CHANGE UP (ref 96–108)
CO2 SERPL-SCNC: 27 MMOL/L — SIGNIFICANT CHANGE UP (ref 22–31)
CREAT SERPL-MCNC: 7.87 MG/DL — HIGH (ref 0.5–1.3)
EOSINOPHIL # BLD AUTO: 0.53 K/UL — HIGH (ref 0–0.5)
EOSINOPHIL NFR BLD AUTO: 5.2 % — SIGNIFICANT CHANGE UP (ref 0–6)
GAMMA INTERFERON BACKGROUND BLD IA-ACNC: 0.04 IU/ML — SIGNIFICANT CHANGE UP
GLUCOSE BLDC GLUCOMTR-MCNC: 109 MG/DL — HIGH (ref 70–99)
GLUCOSE BLDC GLUCOMTR-MCNC: 111 MG/DL — HIGH (ref 70–99)
GLUCOSE BLDC GLUCOMTR-MCNC: 115 MG/DL — HIGH (ref 70–99)
GLUCOSE BLDC GLUCOMTR-MCNC: 123 MG/DL — HIGH (ref 70–99)
GLUCOSE SERPL-MCNC: 104 MG/DL — HIGH (ref 70–99)
HCT VFR BLD CALC: 30 % — LOW (ref 39–50)
HGB BLD-MCNC: 8.7 G/DL — LOW (ref 13–17)
IMM GRANULOCYTES NFR BLD AUTO: 0.5 % — SIGNIFICANT CHANGE UP (ref 0–1.5)
LYMPHOCYTES # BLD AUTO: 0.67 K/UL — LOW (ref 1–3.3)
LYMPHOCYTES # BLD AUTO: 6.5 % — LOW (ref 13–44)
M TB IFN-G BLD-IMP: ABNORMAL
M TB IFN-G CD4+ BCKGRND COR BLD-ACNC: 0.01 IU/ML — SIGNIFICANT CHANGE UP
M TB IFN-G CD4+CD8+ BCKGRND COR BLD-ACNC: 0 IU/ML — SIGNIFICANT CHANGE UP
MAGNESIUM SERPL-MCNC: 2.5 MG/DL — SIGNIFICANT CHANGE UP (ref 1.6–2.6)
MCHC RBC-ENTMCNC: 27.4 PG — SIGNIFICANT CHANGE UP (ref 27–34)
MCHC RBC-ENTMCNC: 29 GM/DL — LOW (ref 32–36)
MCV RBC AUTO: 94.3 FL — SIGNIFICANT CHANGE UP (ref 80–100)
MONOCYTES # BLD AUTO: 1.26 K/UL — HIGH (ref 0–0.9)
MONOCYTES NFR BLD AUTO: 12.3 % — SIGNIFICANT CHANGE UP (ref 2–14)
NEUTROPHILS # BLD AUTO: 7.75 K/UL — HIGH (ref 1.8–7.4)
NEUTROPHILS NFR BLD AUTO: 75.3 % — SIGNIFICANT CHANGE UP (ref 43–77)
NRBC # BLD: 0 /100 WBCS — SIGNIFICANT CHANGE UP (ref 0–0)
PHOSPHATE SERPL-MCNC: 7.1 MG/DL — HIGH (ref 2.5–4.5)
PLATELET # BLD AUTO: 342 K/UL — SIGNIFICANT CHANGE UP (ref 150–400)
POTASSIUM SERPL-MCNC: 4.8 MMOL/L — SIGNIFICANT CHANGE UP (ref 3.5–5.3)
POTASSIUM SERPL-SCNC: 4.8 MMOL/L — SIGNIFICANT CHANGE UP (ref 3.5–5.3)
QUANT TB PLUS MITOGEN MINUS NIL: 0.12 IU/ML — SIGNIFICANT CHANGE UP
RBC # BLD: 3.18 M/UL — LOW (ref 4.2–5.8)
RBC # FLD: 17.9 % — HIGH (ref 10.3–14.5)
SODIUM SERPL-SCNC: 134 MMOL/L — LOW (ref 135–145)
WBC # BLD: 10.28 K/UL — SIGNIFICANT CHANGE UP (ref 3.8–10.5)
WBC # FLD AUTO: 10.28 K/UL — SIGNIFICANT CHANGE UP (ref 3.8–10.5)

## 2019-12-08 PROCEDURE — 99233 SBSQ HOSP IP/OBS HIGH 50: CPT | Mod: GC

## 2019-12-08 RX ORDER — ACETAMINOPHEN 500 MG
650 TABLET ORAL ONCE
Refills: 0 | Status: COMPLETED | OUTPATIENT
Start: 2019-12-08 | End: 2019-12-08

## 2019-12-08 RX ADMIN — ISOSORBIDE MONONITRATE 30 MILLIGRAM(S): 60 TABLET, EXTENDED RELEASE ORAL at 13:43

## 2019-12-08 RX ADMIN — OXYCODONE AND ACETAMINOPHEN 1 TABLET(S): 5; 325 TABLET ORAL at 05:49

## 2019-12-08 RX ADMIN — OXYCODONE AND ACETAMINOPHEN 1 TABLET(S): 5; 325 TABLET ORAL at 13:51

## 2019-12-08 RX ADMIN — HEPARIN SODIUM 5000 UNIT(S): 5000 INJECTION INTRAVENOUS; SUBCUTANEOUS at 13:44

## 2019-12-08 RX ADMIN — CARVEDILOL PHOSPHATE 6.25 MILLIGRAM(S): 80 CAPSULE, EXTENDED RELEASE ORAL at 17:48

## 2019-12-08 RX ADMIN — Medication 100 MILLIGRAM(S): at 21:48

## 2019-12-08 RX ADMIN — Medication 100 MILLIGRAM(S): at 12:07

## 2019-12-08 RX ADMIN — HEPARIN SODIUM 5000 UNIT(S): 5000 INJECTION INTRAVENOUS; SUBCUTANEOUS at 21:49

## 2019-12-08 RX ADMIN — METHOCARBAMOL 500 MILLIGRAM(S): 500 TABLET, FILM COATED ORAL at 17:48

## 2019-12-08 RX ADMIN — RISPERIDONE 1 MILLIGRAM(S): 4 TABLET ORAL at 17:48

## 2019-12-08 RX ADMIN — OXYCODONE AND ACETAMINOPHEN 1 TABLET(S): 5; 325 TABLET ORAL at 22:48

## 2019-12-08 RX ADMIN — BENZOCAINE AND MENTHOL 1 LOZENGE: 5; 1 LIQUID ORAL at 21:49

## 2019-12-08 RX ADMIN — MIRTAZAPINE 7.5 MILLIGRAM(S): 45 TABLET, ORALLY DISINTEGRATING ORAL at 12:08

## 2019-12-08 RX ADMIN — CHLORHEXIDINE GLUCONATE 1 APPLICATION(S): 213 SOLUTION TOPICAL at 13:43

## 2019-12-08 RX ADMIN — Medication 25 MILLIGRAM(S): at 13:44

## 2019-12-08 RX ADMIN — Medication 3 MILLILITER(S): at 14:59

## 2019-12-08 RX ADMIN — Medication 650 MILLIGRAM(S): at 13:52

## 2019-12-08 RX ADMIN — BENZOCAINE AND MENTHOL 1 LOZENGE: 5; 1 LIQUID ORAL at 05:29

## 2019-12-08 RX ADMIN — OXYCODONE AND ACETAMINOPHEN 1 TABLET(S): 5; 325 TABLET ORAL at 21:48

## 2019-12-08 RX ADMIN — RISPERIDONE 1 MILLIGRAM(S): 4 TABLET ORAL at 05:30

## 2019-12-08 RX ADMIN — OXYCODONE AND ACETAMINOPHEN 1 TABLET(S): 5; 325 TABLET ORAL at 12:06

## 2019-12-08 RX ADMIN — CLOPIDOGREL BISULFATE 75 MILLIGRAM(S): 75 TABLET, FILM COATED ORAL at 12:07

## 2019-12-08 RX ADMIN — BENZOCAINE AND MENTHOL 1 LOZENGE: 5; 1 LIQUID ORAL at 10:33

## 2019-12-08 RX ADMIN — BENZOCAINE AND MENTHOL 1 LOZENGE: 5; 1 LIQUID ORAL at 17:48

## 2019-12-08 RX ADMIN — Medication 25 MILLIGRAM(S): at 21:49

## 2019-12-08 RX ADMIN — SEVELAMER CARBONATE 1600 MILLIGRAM(S): 2400 POWDER, FOR SUSPENSION ORAL at 17:47

## 2019-12-08 RX ADMIN — SENNA PLUS 2 TABLET(S): 8.6 TABLET ORAL at 21:49

## 2019-12-08 RX ADMIN — SEVELAMER CARBONATE 1600 MILLIGRAM(S): 2400 POWDER, FOR SUSPENSION ORAL at 08:38

## 2019-12-08 RX ADMIN — Medication 3 MILLILITER(S): at 20:08

## 2019-12-08 RX ADMIN — Medication 3 MILLILITER(S): at 09:33

## 2019-12-08 RX ADMIN — BENZOCAINE AND MENTHOL 1 LOZENGE: 5; 1 LIQUID ORAL at 13:44

## 2019-12-08 RX ADMIN — SIMVASTATIN 40 MILLIGRAM(S): 20 TABLET, FILM COATED ORAL at 21:49

## 2019-12-08 RX ADMIN — Medication 650 MILLIGRAM(S): at 17:09

## 2019-12-08 RX ADMIN — Medication 100 MILLIGRAM(S): at 05:31

## 2019-12-08 RX ADMIN — METHOCARBAMOL 500 MILLIGRAM(S): 500 TABLET, FILM COATED ORAL at 05:30

## 2019-12-08 RX ADMIN — OXYCODONE AND ACETAMINOPHEN 1 TABLET(S): 5; 325 TABLET ORAL at 06:15

## 2019-12-08 RX ADMIN — HEPARIN SODIUM 5000 UNIT(S): 5000 INJECTION INTRAVENOUS; SUBCUTANEOUS at 05:30

## 2019-12-08 NOTE — PROGRESS NOTE ADULT - SUBJECTIVE AND OBJECTIVE BOX
PGY 1 Note discussed with supervising resident and primary attending    Patient is a 39y old  Male who presents with a chief complaint of acute pulmonary edema (07 Dec 2019 23:48)      INTERVAL HPI/OVERNIGHT EVENTS: patient was seen and examined at [facility], no new complains offered, will continue HD as per Nephro, pending discharge  to [facility].  MEDICATIONS  (STANDING):  albuterol/ipratropium for Nebulization 3 milliLiter(s) Nebulizer every 6 hours  benzocaine 15 mG/menthol 3.6 mG (Sugar-Free) Lozenge 1 Lozenge Oral every 4 hours  carvedilol 6.25 milliGRAM(s) Oral every 12 hours  chlorhexidine 4% Liquid 1 Application(s) Topical daily  clopidogrel Tablet 75 milliGRAM(s) Oral daily  heparin  Injectable 5000 Unit(s) SubCutaneous every 8 hours  hydrALAZINE 25 milliGRAM(s) Oral three times a day  insulin lispro (HumaLOG) corrective regimen sliding scale   SubCutaneous Before meals and at bedtime  isosorbide   mononitrate ER Tablet (IMDUR) 30 milliGRAM(s) Oral daily  methocarbamol 500 milliGRAM(s) Oral two times a day  mirtazapine 7.5 milliGRAM(s) Oral daily  polyethylene glycol 3350 17 Gram(s) Oral every 12 hours  risperiDONE   Tablet 1 milliGRAM(s) Oral every 12 hours  senna 2 Tablet(s) Oral at bedtime  sevelamer carbonate 1600 milliGRAM(s) Oral three times a day with meals  simvastatin 40 milliGRAM(s) Oral at bedtime    MEDICATIONS  (PRN):  clonazePAM  Tablet 0.5 milliGRAM(s) Oral every 12 hours PRN Anxiety  guaiFENesin    Syrup 100 milliGRAM(s) Oral every 6 hours PRN Cough  oxycodone    5 mG/acetaminophen 325 mG 1 Tablet(s) Oral every 6 hours PRN Moderate Pain (4 - 6)      __________________________________________________  REVIEW OF SYSTEMS:    CONSTITUTIONAL: No fever,   EYES: no acute visual disturbances  NECK: No pain or stiffness  RESPIRATORY: No cough; No shortness of breath  CARDIOVASCULAR: No chest pain, no palpitations  GASTROINTESTINAL: No pain. No nausea or vomiting; No diarrhea   NEUROLOGICAL: No headache or numbness, no tremors  MUSCULOSKELETAL: No joint pain, no muscle pain  GENITOURINARY: no dysuria, no frequency, no hesitancy  PSYCHIATRY: no depression , no anxiety  ALL OTHER  ROS negative        Vital Signs Last 24 Hrs  T(C): 37.6 (08 Dec 2019 05:11), Max: 37.6 (07 Dec 2019 20:44)  T(F): 99.6 (08 Dec 2019 05:11), Max: 99.7 (07 Dec 2019 20:44)  HR: 81 (08 Dec 2019 05:11) (80 - 88)  BP: 106/50 (08 Dec 2019 05:11) (106/50 - 139/67)  BP(mean): --  RR: 19 (08 Dec 2019 05:11) (16 - 19)  SpO2: 98% (08 Dec 2019 05:11) (98% - 100%)    ________________________________________________  PHYSICAL EXAM:  GENERAL: Obese male  HEENT: Normocephalic;  conjunctivae and sclerae clear; moist mucous membranes;   NECK : supple  CHEST/LUNG: Clear to auscultation bilaterally with good air entry   HEART: S1 S2  regular; no murmurs, gallops or rubs  ABDOMEN: Soft, Nontender, Nondistended; Bowel sounds present  EXTREMITIES: no cyanosis; no edema; no calf tenderness  SKIN: warm and dry; no rash  NERVOUS SYSTEM:  Awake and alert; Oriented  to place, person and time ; no new deficits    _________________________________________________  LABS:                        8.7    10.28 )-----------( 342      ( 08 Dec 2019 07:38 )             30.0     12-08    134<L>  |  96  |  70<H>  ----------------------------<  104<H>  4.8   |  27  |  7.87<H>    Ca    9.0      08 Dec 2019 07:38  Phos  7.1     12-08  Mg     2.5     12-08          CAPILLARY BLOOD GLUCOSE      POCT Blood Glucose.: 123 mg/dL (08 Dec 2019 07:50)  POCT Blood Glucose.: 155 mg/dL (07 Dec 2019 21:14)  POCT Blood Glucose.: 129 mg/dL (07 Dec 2019 17:10)  POCT Blood Glucose.: 109 mg/dL (07 Dec 2019 13:07)        RADIOLOGY & ADDITIONAL TESTS:    Imaging Personally Reviewed:  YES/NO    Consultant(s) Notes Reviewed:   YES/ No    Care Discussed with Consultants :     Plan of care was discussed with patient and /or primary care giver; all questions and concerns were addressed and care was aligned with patient's wishes. PGY 1 Note discussed with supervising resident and primary attending    Patient is a 39y old  Male who presents with a chief complaint of acute pulmonary edema (07 Dec 2019 23:48)      INTERVAL HPI/OVERNIGHT EVENTS: patient was seen and examined at [facility], no new complains offered, will continue HD as per Nephro, pending discharge  to [facility].  MEDICATIONS  (STANDING):  albuterol/ipratropium for Nebulization 3 milliLiter(s) Nebulizer every 6 hours  benzocaine 15 mG/menthol 3.6 mG (Sugar-Free) Lozenge 1 Lozenge Oral every 4 hours  carvedilol 6.25 milliGRAM(s) Oral every 12 hours  chlorhexidine 4% Liquid 1 Application(s) Topical daily  clopidogrel Tablet 75 milliGRAM(s) Oral daily  heparin  Injectable 5000 Unit(s) SubCutaneous every 8 hours  hydrALAZINE 25 milliGRAM(s) Oral three times a day  insulin lispro (HumaLOG) corrective regimen sliding scale   SubCutaneous Before meals and at bedtime  isosorbide   mononitrate ER Tablet (IMDUR) 30 milliGRAM(s) Oral daily  methocarbamol 500 milliGRAM(s) Oral two times a day  mirtazapine 7.5 milliGRAM(s) Oral daily  polyethylene glycol 3350 17 Gram(s) Oral every 12 hours  risperiDONE   Tablet 1 milliGRAM(s) Oral every 12 hours  senna 2 Tablet(s) Oral at bedtime  sevelamer carbonate 1600 milliGRAM(s) Oral three times a day with meals  simvastatin 40 milliGRAM(s) Oral at bedtime    MEDICATIONS  (PRN):  clonazePAM  Tablet 0.5 milliGRAM(s) Oral every 12 hours PRN Anxiety  guaiFENesin    Syrup 100 milliGRAM(s) Oral every 6 hours PRN Cough  oxycodone    5 mG/acetaminophen 325 mG 1 Tablet(s) Oral every 6 hours PRN Moderate Pain (4 - 6)      __________________________________________________  REVIEW OF SYSTEMS:    CONSTITUTIONAL: No fever,   EYES: no acute visual disturbances  NECK: No pain or stiffness  RESPIRATORY: No cough; No shortness of breath  CARDIOVASCULAR: No chest pain, no palpitations  GASTROINTESTINAL: No pain. No nausea or vomiting; No diarrhea   NEUROLOGICAL: No headache or numbness, no tremors  MUSCULOSKELETAL: No joint pain, no muscle pain  GENITOURINARY: no dysuria, no frequency, no hesitancy  PSYCHIATRY: no depression , no anxiety  ALL OTHER  ROS negative        Vital Signs Last 24 Hrs  T(C): 37.6 (08 Dec 2019 05:11), Max: 37.6 (07 Dec 2019 20:44)  T(F): 99.6 (08 Dec 2019 05:11), Max: 99.7 (07 Dec 2019 20:44)  HR: 81 (08 Dec 2019 05:11) (80 - 88)  BP: 106/50 (08 Dec 2019 05:11) (106/50 - 139/67)  BP(mean): --  RR: 19 (08 Dec 2019 05:11) (16 - 19)  SpO2: 98% (08 Dec 2019 05:11) (98% - 100%)    ________________________________________________  PHYSICAL EXAM:  GENERAL: Obese male  HEENT: Normocephalic;  conjunctivae and sclerae clear; moist mucous membranes;   NECK : supple  CHEST/LUNG: Clear to auscultation bilaterally with good air entry   HEART: S1 S2  regular; no murmurs, gallops or rubs  ABDOMEN: Soft, Nontender, Nondistended; Bowel sounds present  EXTREMITIES: no cyanosis;  + pitting lower extremity edema; no calf tenderness  SKIN: warm and dry; no rash  NERVOUS SYSTEM:  Awake and alert; Oriented  to place, person and time ; no new deficits    _________________________________________________  LABS:                        8.7    10.28 )-----------( 342      ( 08 Dec 2019 07:38 )             30.0     12-08    134<L>  |  96  |  70<H>  ----------------------------<  104<H>  4.8   |  27  |  7.87<H>    Ca    9.0      08 Dec 2019 07:38  Phos  7.1     12-08  Mg     2.5     12-08          CAPILLARY BLOOD GLUCOSE      POCT Blood Glucose.: 123 mg/dL (08 Dec 2019 07:50)  POCT Blood Glucose.: 155 mg/dL (07 Dec 2019 21:14)  POCT Blood Glucose.: 129 mg/dL (07 Dec 2019 17:10)  POCT Blood Glucose.: 109 mg/dL (07 Dec 2019 13:07)        RADIOLOGY & ADDITIONAL TESTS:    Imaging Personally Reviewed:  YES/NO    Consultant(s) Notes Reviewed:   YES/ No    Care Discussed with Consultants :     Plan of care was discussed with patient and /or primary care giver; all questions and concerns were addressed and care was aligned with patient's wishes.

## 2019-12-08 NOTE — PROGRESS NOTE ADULT - ASSESSMENT
38 y/o morbidly obese male from home PMH ESRD (on MWF HD via LUE AVF at [facility] w/ Dr La), HTN, IDDM, Substance abuse, COPD (former smoker, on home oxygen 2L), Bipolar disorder, and Schizophrenia p/w shortness of breath x 1 day s/p missed HD - admitted to the ICU for acute respiratory failure 2/2 acute pulmonary edema 2/2 missed HD session requiring nasal BIPAP (given clasutrophobia) 38 y/o morbidly obese male from home PMH ESRD (on MWF HD via LUE AVF at [facility] w/ Dr La), HTN, IDDM, Substance abuse, COPD (former smoker, on home oxygen 2L), Bipolar disorder, and Schizophrenia p/w shortness of breath x 1 day s/p missed HD - admitted to the ICU for acute respiratory failure 2/2 acute pulmonary edema.

## 2019-12-08 NOTE — PROGRESS NOTE ADULT - PROBLEM SELECTOR PLAN 4
S/p HD 12/2 for APE  - Pt still has elevated K and P, sevalamer dose increased and pt started on lokelma, pt also eating outside food which may be contributing  - HD as per Nephrology Dr La and monitor BMP S/p HD 12/2 for APE  - Pt still has elevated K and P, sevalamer dose increased sp lokelma, pt also eating outside food which may be contributing  - HD as per Nephrology Dr La and monitor BMP

## 2019-12-08 NOTE — PROGRESS NOTE ADULT - PROBLEM SELECTOR PLAN 1
cute respiratory failure 2/2 acute pulmonary edema 2/2 missed HD   - Patient refused nasal BIPAP; C/w NC  - does not have COPD, stable on nasal oxygen  - outpatient PFTs showed restrictive lung disease likely from his obesity  - C/w supplemental oxygen and bronchodilators for possible asthma  -pt also seems to have KRYSTIN given VBG showing chronic elevations CO2  -chronic cocaine use may also be contributing to the bronchospasm Acute respiratory failure 2/2 acute pulmonary edema 2/2 missed HD   - Patient refused nasal BIPAP; C/w NC  - does not have COPD, stable on nasal oxygen  - outpatient PFTs showed restrictive lung disease likely from his obesity  - C/w supplemental oxygen and bronchodilators for possible asthma  -pt also seems to have KRYSTIN given VBG showing chronic elevations CO2  -chronic cocaine use may also be contributing to the bronchospasm

## 2019-12-09 LAB
ANION GAP SERPL CALC-SCNC: 8 MMOL/L — SIGNIFICANT CHANGE UP (ref 5–17)
BASOPHILS # BLD AUTO: 0.02 K/UL — SIGNIFICANT CHANGE UP (ref 0–0.2)
BASOPHILS NFR BLD AUTO: 0.2 % — SIGNIFICANT CHANGE UP (ref 0–2)
BUN SERPL-MCNC: 80 MG/DL — HIGH (ref 7–18)
CALCIUM SERPL-MCNC: 8.8 MG/DL — SIGNIFICANT CHANGE UP (ref 8.4–10.5)
CHLORIDE SERPL-SCNC: 96 MMOL/L — SIGNIFICANT CHANGE UP (ref 96–108)
CO2 SERPL-SCNC: 27 MMOL/L — SIGNIFICANT CHANGE UP (ref 22–31)
CREAT SERPL-MCNC: 9.33 MG/DL — HIGH (ref 0.5–1.3)
EOSINOPHIL # BLD AUTO: 0.56 K/UL — HIGH (ref 0–0.5)
EOSINOPHIL NFR BLD AUTO: 6.1 % — HIGH (ref 0–6)
GLUCOSE BLDC GLUCOMTR-MCNC: 107 MG/DL — HIGH (ref 70–99)
GLUCOSE BLDC GLUCOMTR-MCNC: 108 MG/DL — HIGH (ref 70–99)
GLUCOSE BLDC GLUCOMTR-MCNC: 144 MG/DL — HIGH (ref 70–99)
GLUCOSE SERPL-MCNC: 105 MG/DL — HIGH (ref 70–99)
HCT VFR BLD CALC: 30.8 % — LOW (ref 39–50)
HGB BLD-MCNC: 8.9 G/DL — LOW (ref 13–17)
IMM GRANULOCYTES NFR BLD AUTO: 0.7 % — SIGNIFICANT CHANGE UP (ref 0–1.5)
LYMPHOCYTES # BLD AUTO: 0.71 K/UL — LOW (ref 1–3.3)
LYMPHOCYTES # BLD AUTO: 7.8 % — LOW (ref 13–44)
MAGNESIUM SERPL-MCNC: 2.5 MG/DL — SIGNIFICANT CHANGE UP (ref 1.6–2.6)
MCHC RBC-ENTMCNC: 27.4 PG — SIGNIFICANT CHANGE UP (ref 27–34)
MCHC RBC-ENTMCNC: 28.9 GM/DL — LOW (ref 32–36)
MCV RBC AUTO: 94.8 FL — SIGNIFICANT CHANGE UP (ref 80–100)
MONOCYTES # BLD AUTO: 1.26 K/UL — HIGH (ref 0–0.9)
MONOCYTES NFR BLD AUTO: 13.8 % — SIGNIFICANT CHANGE UP (ref 2–14)
NEUTROPHILS # BLD AUTO: 6.55 K/UL — SIGNIFICANT CHANGE UP (ref 1.8–7.4)
NEUTROPHILS NFR BLD AUTO: 71.4 % — SIGNIFICANT CHANGE UP (ref 43–77)
NRBC # BLD: 0 /100 WBCS — SIGNIFICANT CHANGE UP (ref 0–0)
PHOSPHATE SERPL-MCNC: 7.8 MG/DL — HIGH (ref 2.5–4.5)
PLATELET # BLD AUTO: 307 K/UL — SIGNIFICANT CHANGE UP (ref 150–400)
POTASSIUM SERPL-MCNC: 4.8 MMOL/L — SIGNIFICANT CHANGE UP (ref 3.5–5.3)
POTASSIUM SERPL-SCNC: 4.8 MMOL/L — SIGNIFICANT CHANGE UP (ref 3.5–5.3)
RBC # BLD: 3.25 M/UL — LOW (ref 4.2–5.8)
RBC # FLD: 17.8 % — HIGH (ref 10.3–14.5)
SODIUM SERPL-SCNC: 131 MMOL/L — LOW (ref 135–145)
WBC # BLD: 9.16 K/UL — SIGNIFICANT CHANGE UP (ref 3.8–10.5)
WBC # FLD AUTO: 9.16 K/UL — SIGNIFICANT CHANGE UP (ref 3.8–10.5)

## 2019-12-09 PROCEDURE — 99232 SBSQ HOSP IP/OBS MODERATE 35: CPT | Mod: GC

## 2019-12-09 RX ORDER — OXYCODONE AND ACETAMINOPHEN 5; 325 MG/1; MG/1
1 TABLET ORAL EVERY 6 HOURS
Refills: 0 | Status: DISCONTINUED | OUTPATIENT
Start: 2019-12-09 | End: 2019-12-13

## 2019-12-09 RX ORDER — ERYTHROPOIETIN 10000 [IU]/ML
10000 INJECTION, SOLUTION INTRAVENOUS; SUBCUTANEOUS
Refills: 0 | Status: DISCONTINUED | OUTPATIENT
Start: 2019-12-09 | End: 2019-12-13

## 2019-12-09 RX ORDER — CALCIUM ACETATE 667 MG
667 TABLET ORAL
Refills: 0 | Status: DISCONTINUED | OUTPATIENT
Start: 2019-12-09 | End: 2019-12-13

## 2019-12-09 RX ORDER — ACETAMINOPHEN 500 MG
650 TABLET ORAL EVERY 6 HOURS
Refills: 0 | Status: DISCONTINUED | OUTPATIENT
Start: 2019-12-09 | End: 2019-12-13

## 2019-12-09 RX ADMIN — Medication 3 MILLILITER(S): at 14:45

## 2019-12-09 RX ADMIN — CARVEDILOL PHOSPHATE 6.25 MILLIGRAM(S): 80 CAPSULE, EXTENDED RELEASE ORAL at 06:31

## 2019-12-09 RX ADMIN — SEVELAMER CARBONATE 1600 MILLIGRAM(S): 2400 POWDER, FOR SUSPENSION ORAL at 18:02

## 2019-12-09 RX ADMIN — METHOCARBAMOL 500 MILLIGRAM(S): 500 TABLET, FILM COATED ORAL at 18:02

## 2019-12-09 RX ADMIN — BENZOCAINE AND MENTHOL 1 LOZENGE: 5; 1 LIQUID ORAL at 01:53

## 2019-12-09 RX ADMIN — ERYTHROPOIETIN 10000 UNIT(S): 10000 INJECTION, SOLUTION INTRAVENOUS; SUBCUTANEOUS at 12:46

## 2019-12-09 RX ADMIN — OXYCODONE AND ACETAMINOPHEN 1 TABLET(S): 5; 325 TABLET ORAL at 09:52

## 2019-12-09 RX ADMIN — HEPARIN SODIUM 5000 UNIT(S): 5000 INJECTION INTRAVENOUS; SUBCUTANEOUS at 21:51

## 2019-12-09 RX ADMIN — CARVEDILOL PHOSPHATE 6.25 MILLIGRAM(S): 80 CAPSULE, EXTENDED RELEASE ORAL at 18:02

## 2019-12-09 RX ADMIN — OXYCODONE AND ACETAMINOPHEN 1 TABLET(S): 5; 325 TABLET ORAL at 18:57

## 2019-12-09 RX ADMIN — SENNA PLUS 2 TABLET(S): 8.6 TABLET ORAL at 21:51

## 2019-12-09 RX ADMIN — Medication 3 MILLILITER(S): at 02:04

## 2019-12-09 RX ADMIN — Medication 650 MILLIGRAM(S): at 18:38

## 2019-12-09 RX ADMIN — SEVELAMER CARBONATE 1600 MILLIGRAM(S): 2400 POWDER, FOR SUSPENSION ORAL at 09:22

## 2019-12-09 RX ADMIN — BENZOCAINE AND MENTHOL 1 LOZENGE: 5; 1 LIQUID ORAL at 21:51

## 2019-12-09 RX ADMIN — OXYCODONE AND ACETAMINOPHEN 1 TABLET(S): 5; 325 TABLET ORAL at 01:52

## 2019-12-09 RX ADMIN — BENZOCAINE AND MENTHOL 1 LOZENGE: 5; 1 LIQUID ORAL at 06:31

## 2019-12-09 RX ADMIN — OXYCODONE AND ACETAMINOPHEN 1 TABLET(S): 5; 325 TABLET ORAL at 09:22

## 2019-12-09 RX ADMIN — Medication 25 MILLIGRAM(S): at 21:51

## 2019-12-09 RX ADMIN — SIMVASTATIN 40 MILLIGRAM(S): 20 TABLET, FILM COATED ORAL at 21:51

## 2019-12-09 RX ADMIN — BENZOCAINE AND MENTHOL 1 LOZENGE: 5; 1 LIQUID ORAL at 18:03

## 2019-12-09 RX ADMIN — RISPERIDONE 1 MILLIGRAM(S): 4 TABLET ORAL at 18:03

## 2019-12-09 RX ADMIN — OXYCODONE AND ACETAMINOPHEN 1 TABLET(S): 5; 325 TABLET ORAL at 02:52

## 2019-12-09 RX ADMIN — Medication 100 MILLIGRAM(S): at 06:30

## 2019-12-09 RX ADMIN — RISPERIDONE 1 MILLIGRAM(S): 4 TABLET ORAL at 06:30

## 2019-12-09 RX ADMIN — Medication 3 MILLILITER(S): at 08:50

## 2019-12-09 RX ADMIN — Medication 25 MILLIGRAM(S): at 06:30

## 2019-12-09 RX ADMIN — BENZOCAINE AND MENTHOL 1 LOZENGE: 5; 1 LIQUID ORAL at 09:22

## 2019-12-09 RX ADMIN — HEPARIN SODIUM 5000 UNIT(S): 5000 INJECTION INTRAVENOUS; SUBCUTANEOUS at 06:31

## 2019-12-09 RX ADMIN — METHOCARBAMOL 500 MILLIGRAM(S): 500 TABLET, FILM COATED ORAL at 06:31

## 2019-12-09 RX ADMIN — Medication 100 MILLIGRAM(S): at 21:50

## 2019-12-09 RX ADMIN — Medication 3 MILLILITER(S): at 20:09

## 2019-12-09 RX ADMIN — OXYCODONE AND ACETAMINOPHEN 1 TABLET(S): 5; 325 TABLET ORAL at 18:27

## 2019-12-09 NOTE — PROGRESS NOTE ADULT - SUBJECTIVE AND OBJECTIVE BOX
[facility] : Progress Note :: [phone], (office [phone]),   Dr La / Dr Hui / Dr Barber / Dr Villalobos / Dr Hang CASTELLANO / Dr Mehta / Dr Sanchez / Dr Alber dumont  _____________________________________________________________________________________________  seen on HD.  C/O of inability to walk.    aspirin (Short breath)  aspirin (Swelling)  fish (Unknown)  penicillin (Short breath)  penicillins (Swelling)  shellfish (Unknown)    Hospital Medications:   MEDICATIONS  (STANDING):  albuterol/ipratropium for Nebulization 3 milliLiter(s) Nebulizer every 6 hours  benzocaine 15 mG/menthol 3.6 mG (Sugar-Free) Lozenge 1 Lozenge Oral every 4 hours  carvedilol 6.25 milliGRAM(s) Oral every 12 hours  chlorhexidine 4% Liquid 1 Application(s) Topical daily  clopidogrel Tablet 75 milliGRAM(s) Oral daily  epoetin cyndie Injectable 30790 Unit(s) SubCutaneous <User Schedule>  heparin  Injectable 5000 Unit(s) SubCutaneous every 8 hours  hydrALAZINE 25 milliGRAM(s) Oral three times a day  insulin lispro (HumaLOG) corrective regimen sliding scale   SubCutaneous Before meals and at bedtime  isosorbide   mononitrate ER Tablet (IMDUR) 30 milliGRAM(s) Oral daily  methocarbamol 500 milliGRAM(s) Oral two times a day  mirtazapine 7.5 milliGRAM(s) Oral daily  polyethylene glycol 3350 17 Gram(s) Oral every 12 hours  risperiDONE   Tablet 1 milliGRAM(s) Oral every 12 hours  senna 2 Tablet(s) Oral at bedtime  sevelamer carbonate 1600 milliGRAM(s) Oral three times a day with meals  simvastatin 40 milliGRAM(s) Oral at bedtime        VITALS:  T(F): 101.8 (12-09-19 @ 17:31), Max: 101.8 (12-09-19 @ 17:31)  HR: 85 (12-09-19 @ 17:31)  BP: 114/44 (12-09-19 @ 17:31)  RR: 20 (12-09-19 @ 17:31)  SpO2: 98% (12-09-19 @ 17:31)  Wt(kg): --      PHYSICAL EXAM:  Constitutional: NAD  HEENT: anicteric sclera, oropharynx clear.  Neck: No JVD  Respiratory: CTAB, no wheezes, rales or rhonchi  Cardiovascular: S1, S2, RRR  Gastrointestinal: BS+, soft, NT/ND  Extremities:  No peripheral edema  Neurological: A/O x 3, no focal deficits  Vascular Access: AVF with thrill and bruit    LABS:  12-09    131<L>  |  96  |  80<H>  ----------------------------<  105<H>  4.8   |  27  |  9.33<H>    Ca    8.8      09 Dec 2019 08:31  Phos  7.8     12-09  Mg     2.5     12-09      Creatinine Trend: 9.33 <--, 7.87 <--, 8.46 <--, 7.01 <--, 8.69 <--, 7.64 <--, 8.17 <--                        8.9    9.16  )-----------( 307      ( 09 Dec 2019 08:31 )             30.8     Urine Studies:      RADIOLOGY & ADDITIONAL STUDIES:

## 2019-12-09 NOTE — PROGRESS NOTE ADULT - SUBJECTIVE AND OBJECTIVE BOX
PGY 1 Note discussed with supervising resident and primary attending    Patient is a 39y old  Male who presents with a chief complaint of acute pulmonary edema (08 Dec 2019 09:26)      INTERVAL HPI/OVERNIGHT EVENTS:  Patient was seen and examined at bed side, no new complains offereed. blood Cx are  negative.  no fever since yesterday.    MEDICATIONS  (STANDING):  albuterol/ipratropium for Nebulization 3 milliLiter(s) Nebulizer every 6 hours  benzocaine 15 mG/menthol 3.6 mG (Sugar-Free) Lozenge 1 Lozenge Oral every 4 hours  carvedilol 6.25 milliGRAM(s) Oral every 12 hours  chlorhexidine 4% Liquid 1 Application(s) Topical daily  clopidogrel Tablet 75 milliGRAM(s) Oral daily  heparin  Injectable 5000 Unit(s) SubCutaneous every 8 hours  hydrALAZINE 25 milliGRAM(s) Oral three times a day  insulin lispro (HumaLOG) corrective regimen sliding scale   SubCutaneous Before meals and at bedtime  isosorbide   mononitrate ER Tablet (IMDUR) 30 milliGRAM(s) Oral daily  methocarbamol 500 milliGRAM(s) Oral two times a day  mirtazapine 7.5 milliGRAM(s) Oral daily  polyethylene glycol 3350 17 Gram(s) Oral every 12 hours  risperiDONE   Tablet 1 milliGRAM(s) Oral every 12 hours  senna 2 Tablet(s) Oral at bedtime  sevelamer carbonate 1600 milliGRAM(s) Oral three times a day with meals  simvastatin 40 milliGRAM(s) Oral at bedtime    MEDICATIONS  (PRN):  guaiFENesin    Syrup 100 milliGRAM(s) Oral every 6 hours PRN Cough  oxycodone    5 mG/acetaminophen 325 mG 1 Tablet(s) Oral every 6 hours PRN Severe Pain (7 - 10)      __________________________________________________  REVIEW OF SYSTEMS:    CONSTITUTIONAL: No fever,   EYES: no acute visual disturbances  NECK: No pain or stiffness  RESPIRATORY: No cough; No shortness of breath  CARDIOVASCULAR: No chest pain, no palpitations  GASTROINTESTINAL: No pain. No nausea or vomiting; No diarrhea   NEUROLOGICAL: No headache or numbness, no tremors  MUSCULOSKELETAL: No joint pain, no muscle pain  GENITOURINARY: no dysuria, no frequency, no hesitancy  PSYCHIATRY: no depression , no anxiety  ALL OTHER  ROS negative        Vital Signs Last 24 Hrs  T(C): 36.8 (09 Dec 2019 10:23), Max: 37.9 (08 Dec 2019 13:40)  T(F): 98.3 (09 Dec 2019 10:23), Max: 100.2 (08 Dec 2019 13:40)  HR: 77 (09 Dec 2019 10:23) (77 - 90)  BP: 104/44 (09 Dec 2019 10:23) (104/44 - 137/64)  BP(mean): --  RR: 18 (09 Dec 2019 10:23) (18 - 18)  SpO2: 98% (09 Dec 2019 05:01) (97% - 100%)    ________________________________________________  PHYSICAL EXAM:  GENERAL: Obese Male  HEENT: Normocephalic;  conjunctivae and sclerae clear; moist mucous membranes;   NECK : supple  CHEST/LUNG: Clear to auscultation bilaterally with good air entry   HEART: S1 S2  regular; no murmurs, gallops or rubs  ABDOMEN: Soft, Nontender, Nondistended; Bowel sounds present  EXTREMITIES: no cyanosis; no edema; no calf tenderness  SKIN: warm and dry; no rash  NERVOUS SYSTEM:  Awake and alert; Oriented  to place, person and time ; no new deficits    _________________________________________________  LABS:                        8.9    9.16  )-----------( 307      ( 09 Dec 2019 08:31 )             30.8     12-09    131<L>  |  96  |  80<H>  ----------------------------<  105<H>  4.8   |  27  |  9.33<H>    Ca    8.8      09 Dec 2019 08:31  Phos  7.8     12-09  Mg     2.5     12-09          CAPILLARY BLOOD GLUCOSE      POCT Blood Glucose.: 107 mg/dL (09 Dec 2019 07:56)  POCT Blood Glucose.: 111 mg/dL (08 Dec 2019 21:40)  POCT Blood Glucose.: 115 mg/dL (08 Dec 2019 16:56)        RADIOLOGY & ADDITIONAL TESTS:    Imaging Personally Reviewed:  YES/NO    Consultant(s) Notes Reviewed:   YES/ No    Care Discussed with Consultants :     Plan of care was discussed with patient and /or primary care giver; all questions and concerns were addressed and care was aligned with patient's wishes.

## 2019-12-09 NOTE — PROGRESS NOTE ADULT - PROBLEM SELECTOR PLAN 1
Acute respiratory failure 2/2 acute pulmonary edema 2/2 missed HD   - Patient refused nasal BIPAP; C/w NC  - does not have COPD, stable on nasal oxygen  - outpatient PFTs showed restrictive lung disease likely from his obesity  - C/w supplemental oxygen and bronchodilators for possible asthma  -pt also seems to have KRYSTIN given VBG showing chronic elevations CO2  -chronic cocaine use may also be contributing to the bronchospasm

## 2019-12-09 NOTE — PROGRESS NOTE ADULT - ASSESSMENT
40 y/o morbidly obese male from home PMH ESRD (on MWF HD via LUE AVF at [facility] w/ Dr La), HTN, IDDM, Substance abuse, COPD (former smoker, on home oxygen 2L), Bipolar disorder, and Schizophrenia p/w shortness of breath x 1 day s/p missed HD - admitted to the ICU for acute respiratory failure 2/2 acute pulmonary edema.

## 2019-12-09 NOTE — PROGRESS NOTE ADULT - ASSESSMENT
# ESRD. Seen today on HD tolerating UF.  #HTN controlled  #hyponatremia from hypervolemia sec to increase fluid intake Control of fluid intake re-inforced  # Chronic Kidney Disease Stage . elevated phos. Cont sevelamer 1600mg three times per day AC.  renal diet. Add phoslo as as well.  # anemia of CKD. Procrit on HD   d/c planning

## 2019-12-09 NOTE — PROGRESS NOTE ADULT - PROBLEM SELECTOR PLAN 4
S/p HD 12/2 for APE  - Pt still has elevated K and P, sevalamer dose increased sp lokelma, pt also eating outside food which may be contributing  - HD as per Nephrology Dr La and monitor BMP

## 2019-12-10 LAB
GLUCOSE BLDC GLUCOMTR-MCNC: 107 MG/DL — HIGH (ref 70–99)
GLUCOSE BLDC GLUCOMTR-MCNC: 115 MG/DL — HIGH (ref 70–99)
GLUCOSE BLDC GLUCOMTR-MCNC: 134 MG/DL — HIGH (ref 70–99)
GLUCOSE BLDC GLUCOMTR-MCNC: 150 MG/DL — HIGH (ref 70–99)

## 2019-12-10 PROCEDURE — 99232 SBSQ HOSP IP/OBS MODERATE 35: CPT | Mod: GC

## 2019-12-10 RX ADMIN — Medication 3 MILLILITER(S): at 20:02

## 2019-12-10 RX ADMIN — SEVELAMER CARBONATE 1600 MILLIGRAM(S): 2400 POWDER, FOR SUSPENSION ORAL at 12:54

## 2019-12-10 RX ADMIN — BENZOCAINE AND MENTHOL 1 LOZENGE: 5; 1 LIQUID ORAL at 21:50

## 2019-12-10 RX ADMIN — CLOPIDOGREL BISULFATE 75 MILLIGRAM(S): 75 TABLET, FILM COATED ORAL at 12:55

## 2019-12-10 RX ADMIN — SEVELAMER CARBONATE 1600 MILLIGRAM(S): 2400 POWDER, FOR SUSPENSION ORAL at 08:32

## 2019-12-10 RX ADMIN — CARVEDILOL PHOSPHATE 6.25 MILLIGRAM(S): 80 CAPSULE, EXTENDED RELEASE ORAL at 06:49

## 2019-12-10 RX ADMIN — HEPARIN SODIUM 5000 UNIT(S): 5000 INJECTION INTRAVENOUS; SUBCUTANEOUS at 06:49

## 2019-12-10 RX ADMIN — SIMVASTATIN 40 MILLIGRAM(S): 20 TABLET, FILM COATED ORAL at 21:50

## 2019-12-10 RX ADMIN — Medication 667 MILLIGRAM(S): at 12:55

## 2019-12-10 RX ADMIN — OXYCODONE AND ACETAMINOPHEN 1 TABLET(S): 5; 325 TABLET ORAL at 19:30

## 2019-12-10 RX ADMIN — Medication 667 MILLIGRAM(S): at 09:57

## 2019-12-10 RX ADMIN — BENZOCAINE AND MENTHOL 1 LOZENGE: 5; 1 LIQUID ORAL at 06:48

## 2019-12-10 RX ADMIN — Medication 25 MILLIGRAM(S): at 06:51

## 2019-12-10 RX ADMIN — RISPERIDONE 1 MILLIGRAM(S): 4 TABLET ORAL at 06:49

## 2019-12-10 RX ADMIN — CHLORHEXIDINE GLUCONATE 1 APPLICATION(S): 213 SOLUTION TOPICAL at 12:55

## 2019-12-10 RX ADMIN — BENZOCAINE AND MENTHOL 1 LOZENGE: 5; 1 LIQUID ORAL at 18:05

## 2019-12-10 RX ADMIN — Medication 100 MILLIGRAM(S): at 21:49

## 2019-12-10 RX ADMIN — OXYCODONE AND ACETAMINOPHEN 1 TABLET(S): 5; 325 TABLET ORAL at 08:31

## 2019-12-10 RX ADMIN — BENZOCAINE AND MENTHOL 1 LOZENGE: 5; 1 LIQUID ORAL at 13:46

## 2019-12-10 RX ADMIN — Medication 25 MILLIGRAM(S): at 13:45

## 2019-12-10 RX ADMIN — ISOSORBIDE MONONITRATE 30 MILLIGRAM(S): 60 TABLET, EXTENDED RELEASE ORAL at 13:46

## 2019-12-10 RX ADMIN — OXYCODONE AND ACETAMINOPHEN 1 TABLET(S): 5; 325 TABLET ORAL at 09:30

## 2019-12-10 RX ADMIN — HEPARIN SODIUM 5000 UNIT(S): 5000 INJECTION INTRAVENOUS; SUBCUTANEOUS at 13:46

## 2019-12-10 RX ADMIN — POLYETHYLENE GLYCOL 3350 17 GRAM(S): 17 POWDER, FOR SOLUTION ORAL at 06:49

## 2019-12-10 RX ADMIN — Medication 25 MILLIGRAM(S): at 21:50

## 2019-12-10 RX ADMIN — BENZOCAINE AND MENTHOL 1 LOZENGE: 5; 1 LIQUID ORAL at 09:57

## 2019-12-10 RX ADMIN — Medication 100 MILLIGRAM(S): at 12:55

## 2019-12-10 RX ADMIN — HEPARIN SODIUM 5000 UNIT(S): 5000 INJECTION INTRAVENOUS; SUBCUTANEOUS at 21:50

## 2019-12-10 RX ADMIN — MIRTAZAPINE 7.5 MILLIGRAM(S): 45 TABLET, ORALLY DISINTEGRATING ORAL at 12:55

## 2019-12-10 RX ADMIN — Medication 650 MILLIGRAM(S): at 18:06

## 2019-12-10 RX ADMIN — OXYCODONE AND ACETAMINOPHEN 1 TABLET(S): 5; 325 TABLET ORAL at 20:30

## 2019-12-10 RX ADMIN — METHOCARBAMOL 500 MILLIGRAM(S): 500 TABLET, FILM COATED ORAL at 06:49

## 2019-12-10 RX ADMIN — SENNA PLUS 2 TABLET(S): 8.6 TABLET ORAL at 21:50

## 2019-12-10 RX ADMIN — SEVELAMER CARBONATE 1600 MILLIGRAM(S): 2400 POWDER, FOR SUSPENSION ORAL at 18:05

## 2019-12-10 RX ADMIN — Medication 3 MILLILITER(S): at 08:01

## 2019-12-10 RX ADMIN — Medication 667 MILLIGRAM(S): at 18:05

## 2019-12-10 RX ADMIN — METHOCARBAMOL 500 MILLIGRAM(S): 500 TABLET, FILM COATED ORAL at 18:05

## 2019-12-10 RX ADMIN — RISPERIDONE 1 MILLIGRAM(S): 4 TABLET ORAL at 18:06

## 2019-12-10 RX ADMIN — Medication 650 MILLIGRAM(S): at 16:48

## 2019-12-10 RX ADMIN — Medication 3 MILLILITER(S): at 14:28

## 2019-12-10 NOTE — PROGRESS NOTE ADULT - SUBJECTIVE AND OBJECTIVE BOX
PGY 1 Note discussed with supervising resident and primary attending    Patient is a 39y old  Male who presents with a chief complaint of acute pulmonary edema (09 Dec 2019 18:40)      INTERVAL HPI/OVERNIGHT EVENTS: Patient was seen and examined at bedside, he is stable,  not seems to be agitated or in distress. Patients behaviour has been fine since past couple of days. No psych intervention needed at this time.  Patient spiked a fever, blood cultures on 7th of december were negative      MEDICATIONS  (STANDING):  albuterol/ipratropium for Nebulization 3 milliLiter(s) Nebulizer every 6 hours  benzocaine 15 mG/menthol 3.6 mG (Sugar-Free) Lozenge 1 Lozenge Oral every 4 hours  calcium acetate 667 milliGRAM(s) Oral three times a day with meals  carvedilol 6.25 milliGRAM(s) Oral every 12 hours  chlorhexidine 4% Liquid 1 Application(s) Topical daily  clopidogrel Tablet 75 milliGRAM(s) Oral daily  epoetin cyndie Injectable 52648 Unit(s) SubCutaneous <User Schedule>  heparin  Injectable 5000 Unit(s) SubCutaneous every 8 hours  hydrALAZINE 25 milliGRAM(s) Oral three times a day  insulin lispro (HumaLOG) corrective regimen sliding scale   SubCutaneous Before meals and at bedtime  isosorbide   mononitrate ER Tablet (IMDUR) 30 milliGRAM(s) Oral daily  methocarbamol 500 milliGRAM(s) Oral two times a day  mirtazapine 7.5 milliGRAM(s) Oral daily  polyethylene glycol 3350 17 Gram(s) Oral every 12 hours  risperiDONE   Tablet 1 milliGRAM(s) Oral every 12 hours  senna 2 Tablet(s) Oral at bedtime  sevelamer carbonate 1600 milliGRAM(s) Oral three times a day with meals  simvastatin 40 milliGRAM(s) Oral at bedtime    MEDICATIONS  (PRN):  acetaminophen   Tablet .. 650 milliGRAM(s) Oral every 6 hours PRN Temp greater or equal to 38C (100.4F)  guaiFENesin    Syrup 100 milliGRAM(s) Oral every 6 hours PRN Cough  oxycodone    5 mG/acetaminophen 325 mG 1 Tablet(s) Oral every 6 hours PRN Severe Pain (7 - 10)      __________________________________________________  REVIEW OF SYSTEMS:    CONSTITUTIONAL: No fever,   EYES: no acute visual disturbances  NECK: No pain or stiffness  RESPIRATORY: No cough; No shortness of breath  CARDIOVASCULAR: No chest pain, no palpitations  GASTROINTESTINAL: No pain. No nausea or vomiting; No diarrhea   NEUROLOGICAL: No headache or numbness, no tremors  MUSCULOSKELETAL: No joint pain, no muscle pain  GENITOURINARY: no dysuria, no frequency, no hesitancy  PSYCHIATRY: no depression , no anxiety  ALL OTHER  ROS negative        Vital Signs Last 24 Hrs  T(C): 36.4 (10 Dec 2019 05:40), Max: 38.8 (09 Dec 2019 17:31)  T(F): 97.6 (10 Dec 2019 05:40), Max: 101.8 (09 Dec 2019 17:31)  HR: 77 (10 Dec 2019 05:40) (77 - 88)  BP: 120/63 (10 Dec 2019 05:40) (104/44 - 125/52)  BP(mean): --  RR: 18 (10 Dec 2019 05:40) (18 - 20)  SpO2: 100% (10 Dec 2019 05:40) (98% - 100%)    ________________________________________________  PHYSICAL EXAM:  GENERAL: Huge obese male  HEENT: Normocephalic;  conjunctivae and sclerae clear; moist mucous membranes;   NECK : supple  CHEST/LUNG: Clear to auscultation bilaterally with good air entry   HEART: S1 S2  regular; no murmurs, gallops or rubs  ABDOMEN: Soft, Nontender, Nondistended; Bowel sounds present  EXTREMITIES: no cyanosis; no edema; no calf tenderness  SKIN: warm and dry; no rash  NERVOUS SYSTEM:  Awake and alert; Oriented  to place, person and time ; no new deficits    _________________________________________________  LABS:                        8.9    9.16  )-----------( 307      ( 09 Dec 2019 08:31 )             30.8     12-09    131<L>  |  96  |  80<H>  ----------------------------<  105<H>  4.8   |  27  |  9.33<H>    Ca    8.8      09 Dec 2019 08:31  Phos  7.8     12-09  Mg     2.5     12-09          CAPILLARY BLOOD GLUCOSE      POCT Blood Glucose.: 115 mg/dL (10 Dec 2019 08:17)  POCT Blood Glucose.: 108 mg/dL (09 Dec 2019 21:30)  POCT Blood Glucose.: 144 mg/dL (09 Dec 2019 17:04)        RADIOLOGY & ADDITIONAL TESTS:    Imaging Personally Reviewed:  YES/NO    Consultant(s) Notes Reviewed:   YES/ No    Care Discussed with Consultants :     Plan of care was discussed with patient and /or primary care giver; all questions and concerns were addressed and care was aligned with patient's wishes.

## 2019-12-10 NOTE — PROGRESS NOTE ADULT - PROBLEM SELECTOR PLAN 1
Acute respiratory failure 2/2 acute pulmonary edema 2/2 missed HD   - Patient refused nasal BIPAP; C/w NC  - does not have COPD, stable on nasal oxygen  - outpatient PFTs showed restrictive lung disease likely from his obesity  - C/w supplemental oxygen and bronchodilators for possible asthma  -pt also seems to have KRYTSIN given VBG showing chronic elevations CO2  -chronic cocaine use may also be contributing to the bronchospasm

## 2019-12-10 NOTE — PROGRESS NOTE ADULT - ASSESSMENT
# ESRD.  HD in AM with UF as tolerated.  #HTN controlled  #hyponatremia from hypervolemia sec to increase fluid intake Control of fluid intake re-inforced  # Chronic Kidney Disease Stage . elevated phos. Cont sevelamer 1600mg three times per day A and phoslo as well.  # anemia of CKD. Procrit on HD   d/c planning to rehab

## 2019-12-10 NOTE — PROGRESS NOTE ADULT - ASSESSMENT
40 y/o morbidly obese male from home PMH ESRD (on MWF HD via LUE AVF at [facility] w/ Dr La), HTN, IDDM, Substance abuse, COPD (former smoker, on home oxygen 2L), Bipolar disorder, and Schizophrenia p/w shortness of breath x 1 day s/p missed HD - admitted to the ICU for acute respiratory failure 2/2 acute pulmonary edema.

## 2019-12-10 NOTE — PROGRESS NOTE ADULT - SUBJECTIVE AND OBJECTIVE BOX
[facility] : Progress Note :: [phone], (office [phone]),   Dr La / Dr Hui / Dr Barber / Dr Villalobos / Dr Hang CASTELLANO / Dr Mehta / Dr Sanchez / Dr Alber dumont  _____________________________________________________________________________________________    s/p HD yesterday      aspirin (Short breath)  aspirin (Swelling)  fish (Unknown)  penicillin (Short breath)  penicillins (Swelling)  shellfish (Unknown)    Hospital Medications:   MEDICATIONS  (STANDING):  albuterol/ipratropium for Nebulization 3 milliLiter(s) Nebulizer every 6 hours  benzocaine 15 mG/menthol 3.6 mG (Sugar-Free) Lozenge 1 Lozenge Oral every 4 hours  calcium acetate 667 milliGRAM(s) Oral three times a day with meals  carvedilol 6.25 milliGRAM(s) Oral every 12 hours  chlorhexidine 4% Liquid 1 Application(s) Topical daily  clopidogrel Tablet 75 milliGRAM(s) Oral daily  epoetin cyndie Injectable 90119 Unit(s) SubCutaneous <User Schedule>  heparin  Injectable 5000 Unit(s) SubCutaneous every 8 hours  hydrALAZINE 25 milliGRAM(s) Oral three times a day  insulin lispro (HumaLOG) corrective regimen sliding scale   SubCutaneous Before meals and at bedtime  isosorbide   mononitrate ER Tablet (IMDUR) 30 milliGRAM(s) Oral daily  methocarbamol 500 milliGRAM(s) Oral two times a day  mirtazapine 7.5 milliGRAM(s) Oral daily  polyethylene glycol 3350 17 Gram(s) Oral every 12 hours  risperiDONE   Tablet 1 milliGRAM(s) Oral every 12 hours  senna 2 Tablet(s) Oral at bedtime  sevelamer carbonate 1600 milliGRAM(s) Oral three times a day with meals  simvastatin 40 milliGRAM(s) Oral at bedtime        VITALS:  T(F): 101 (12-10-19 @ 16:28), Max: 101.8 (12-09-19 @ 17:31)  HR: 85 (12-10-19 @ 14:21)  BP: 127/63 (12-10-19 @ 14:21)  RR: 17 (12-10-19 @ 14:21)  SpO2: 100% (12-10-19 @ 14:21)  Wt(kg): --      PHYSICAL EXAM:  Constitutional: NAD  HEENT: anicteric sclera, oropharynx clear.  Neck: No JVD  Respiratory: CTAB, no wheezes, rales or rhonchi  Cardiovascular: S1, S2, RRR  Gastrointestinal: BS+, soft, NT/ND  Extremities:  peripheral edema++. chronic stasis changes  Neurological: A/O x 3, no focal deficits  Vascular Access:  AVF with thrill and bruit    LABS:  12-09    131<L>  |  96  |  80<H>  ----------------------------<  105<H>  4.8   |  27  |  9.33<H>    Ca    8.8      09 Dec 2019 08:31  Phos  7.8     12-09  Mg     2.5     12-09      Creatinine Trend: 9.33 <--, 7.87 <--, 8.46 <--, 7.01 <--, 8.69 <--, 7.64 <--                        8.9    9.16  )-----------( 307      ( 09 Dec 2019 08:31 )             30.8     Urine Studies:      RADIOLOGY & ADDITIONAL STUDIES:

## 2019-12-11 LAB
ANION GAP SERPL CALC-SCNC: 11 MMOL/L — SIGNIFICANT CHANGE UP (ref 5–17)
BASOPHILS # BLD AUTO: 0.03 K/UL — SIGNIFICANT CHANGE UP (ref 0–0.2)
BASOPHILS NFR BLD AUTO: 0.3 % — SIGNIFICANT CHANGE UP (ref 0–2)
BUN SERPL-MCNC: 78 MG/DL — HIGH (ref 7–18)
CALCIUM SERPL-MCNC: 9.3 MG/DL — SIGNIFICANT CHANGE UP (ref 8.4–10.5)
CHLORIDE SERPL-SCNC: 94 MMOL/L — LOW (ref 96–108)
CO2 SERPL-SCNC: 27 MMOL/L — SIGNIFICANT CHANGE UP (ref 22–31)
CREAT SERPL-MCNC: 9.03 MG/DL — HIGH (ref 0.5–1.3)
EOSINOPHIL # BLD AUTO: 0.47 K/UL — SIGNIFICANT CHANGE UP (ref 0–0.5)
EOSINOPHIL NFR BLD AUTO: 4.9 % — SIGNIFICANT CHANGE UP (ref 0–6)
GLUCOSE BLDC GLUCOMTR-MCNC: 109 MG/DL — HIGH (ref 70–99)
GLUCOSE BLDC GLUCOMTR-MCNC: 183 MG/DL — HIGH (ref 70–99)
GLUCOSE BLDC GLUCOMTR-MCNC: 78 MG/DL — SIGNIFICANT CHANGE UP (ref 70–99)
GLUCOSE SERPL-MCNC: 115 MG/DL — HIGH (ref 70–99)
HCT VFR BLD CALC: 28.9 % — LOW (ref 39–50)
HGB BLD-MCNC: 8.3 G/DL — LOW (ref 13–17)
IMM GRANULOCYTES NFR BLD AUTO: 0.6 % — SIGNIFICANT CHANGE UP (ref 0–1.5)
LYMPHOCYTES # BLD AUTO: 0.7 K/UL — LOW (ref 1–3.3)
LYMPHOCYTES # BLD AUTO: 7.2 % — LOW (ref 13–44)
MAGNESIUM SERPL-MCNC: 2.6 MG/DL — SIGNIFICANT CHANGE UP (ref 1.6–2.6)
MCHC RBC-ENTMCNC: 27.2 PG — SIGNIFICANT CHANGE UP (ref 27–34)
MCHC RBC-ENTMCNC: 28.7 GM/DL — LOW (ref 32–36)
MCV RBC AUTO: 94.8 FL — SIGNIFICANT CHANGE UP (ref 80–100)
MONOCYTES # BLD AUTO: 1.13 K/UL — HIGH (ref 0–0.9)
MONOCYTES NFR BLD AUTO: 11.7 % — SIGNIFICANT CHANGE UP (ref 2–14)
NEUTROPHILS # BLD AUTO: 7.27 K/UL — SIGNIFICANT CHANGE UP (ref 1.8–7.4)
NEUTROPHILS NFR BLD AUTO: 75.3 % — SIGNIFICANT CHANGE UP (ref 43–77)
NRBC # BLD: 0 /100 WBCS — SIGNIFICANT CHANGE UP (ref 0–0)
PHOSPHATE SERPL-MCNC: 7 MG/DL — HIGH (ref 2.5–4.5)
PLATELET # BLD AUTO: 326 K/UL — SIGNIFICANT CHANGE UP (ref 150–400)
POTASSIUM SERPL-MCNC: 5.1 MMOL/L — SIGNIFICANT CHANGE UP (ref 3.5–5.3)
POTASSIUM SERPL-SCNC: 5.1 MMOL/L — SIGNIFICANT CHANGE UP (ref 3.5–5.3)
RBC # BLD: 3.05 M/UL — LOW (ref 4.2–5.8)
RBC # FLD: 17.8 % — HIGH (ref 10.3–14.5)
SODIUM SERPL-SCNC: 132 MMOL/L — LOW (ref 135–145)
WBC # BLD: 9.66 K/UL — SIGNIFICANT CHANGE UP (ref 3.8–10.5)
WBC # FLD AUTO: 9.66 K/UL — SIGNIFICANT CHANGE UP (ref 3.8–10.5)

## 2019-12-11 PROCEDURE — 99232 SBSQ HOSP IP/OBS MODERATE 35: CPT | Mod: GC

## 2019-12-11 PROCEDURE — 71045 X-RAY EXAM CHEST 1 VIEW: CPT | Mod: 26

## 2019-12-11 RX ORDER — OXYCODONE AND ACETAMINOPHEN 5; 325 MG/1; MG/1
1 TABLET ORAL ONCE
Refills: 0 | Status: DISCONTINUED | OUTPATIENT
Start: 2019-12-11 | End: 2019-12-11

## 2019-12-11 RX ORDER — VANCOMYCIN HCL 1 G
1500 VIAL (EA) INTRAVENOUS ONCE
Refills: 0 | Status: COMPLETED | OUTPATIENT
Start: 2019-12-11 | End: 2019-12-11

## 2019-12-11 RX ADMIN — OXYCODONE AND ACETAMINOPHEN 1 TABLET(S): 5; 325 TABLET ORAL at 02:57

## 2019-12-11 RX ADMIN — CHLORHEXIDINE GLUCONATE 1 APPLICATION(S): 213 SOLUTION TOPICAL at 18:08

## 2019-12-11 RX ADMIN — MIRTAZAPINE 7.5 MILLIGRAM(S): 45 TABLET, ORALLY DISINTEGRATING ORAL at 18:11

## 2019-12-11 RX ADMIN — ISOSORBIDE MONONITRATE 30 MILLIGRAM(S): 60 TABLET, EXTENDED RELEASE ORAL at 18:10

## 2019-12-11 RX ADMIN — Medication 650 MILLIGRAM(S): at 22:40

## 2019-12-11 RX ADMIN — RISPERIDONE 1 MILLIGRAM(S): 4 TABLET ORAL at 18:11

## 2019-12-11 RX ADMIN — OXYCODONE AND ACETAMINOPHEN 1 TABLET(S): 5; 325 TABLET ORAL at 19:12

## 2019-12-11 RX ADMIN — SENNA PLUS 2 TABLET(S): 8.6 TABLET ORAL at 21:10

## 2019-12-11 RX ADMIN — Medication 3 MILLILITER(S): at 20:33

## 2019-12-11 RX ADMIN — OXYCODONE AND ACETAMINOPHEN 1 TABLET(S): 5; 325 TABLET ORAL at 09:30

## 2019-12-11 RX ADMIN — SEVELAMER CARBONATE 1600 MILLIGRAM(S): 2400 POWDER, FOR SUSPENSION ORAL at 18:12

## 2019-12-11 RX ADMIN — Medication 667 MILLIGRAM(S): at 08:35

## 2019-12-11 RX ADMIN — OXYCODONE AND ACETAMINOPHEN 1 TABLET(S): 5; 325 TABLET ORAL at 21:55

## 2019-12-11 RX ADMIN — HEPARIN SODIUM 5000 UNIT(S): 5000 INJECTION INTRAVENOUS; SUBCUTANEOUS at 06:31

## 2019-12-11 RX ADMIN — SEVELAMER CARBONATE 1600 MILLIGRAM(S): 2400 POWDER, FOR SUSPENSION ORAL at 08:35

## 2019-12-11 RX ADMIN — OXYCODONE AND ACETAMINOPHEN 1 TABLET(S): 5; 325 TABLET ORAL at 03:57

## 2019-12-11 RX ADMIN — Medication 25 MILLIGRAM(S): at 21:09

## 2019-12-11 RX ADMIN — ERYTHROPOIETIN 10000 UNIT(S): 10000 INJECTION, SOLUTION INTRAVENOUS; SUBCUTANEOUS at 15:07

## 2019-12-11 RX ADMIN — Medication 1: at 21:10

## 2019-12-11 RX ADMIN — CLOPIDOGREL BISULFATE 75 MILLIGRAM(S): 75 TABLET, FILM COATED ORAL at 18:09

## 2019-12-11 RX ADMIN — HEPARIN SODIUM 5000 UNIT(S): 5000 INJECTION INTRAVENOUS; SUBCUTANEOUS at 21:10

## 2019-12-11 RX ADMIN — METHOCARBAMOL 500 MILLIGRAM(S): 500 TABLET, FILM COATED ORAL at 06:32

## 2019-12-11 RX ADMIN — BENZOCAINE AND MENTHOL 1 LOZENGE: 5; 1 LIQUID ORAL at 21:09

## 2019-12-11 RX ADMIN — Medication 100 MILLIGRAM(S): at 06:32

## 2019-12-11 RX ADMIN — METHOCARBAMOL 500 MILLIGRAM(S): 500 TABLET, FILM COATED ORAL at 18:10

## 2019-12-11 RX ADMIN — Medication 650 MILLIGRAM(S): at 19:53

## 2019-12-11 RX ADMIN — CARVEDILOL PHOSPHATE 6.25 MILLIGRAM(S): 80 CAPSULE, EXTENDED RELEASE ORAL at 18:09

## 2019-12-11 RX ADMIN — Medication 300 MILLIGRAM(S): at 20:18

## 2019-12-11 RX ADMIN — OXYCODONE AND ACETAMINOPHEN 1 TABLET(S): 5; 325 TABLET ORAL at 22:41

## 2019-12-11 RX ADMIN — BENZOCAINE AND MENTHOL 1 LOZENGE: 5; 1 LIQUID ORAL at 06:32

## 2019-12-11 RX ADMIN — OXYCODONE AND ACETAMINOPHEN 1 TABLET(S): 5; 325 TABLET ORAL at 18:11

## 2019-12-11 RX ADMIN — Medication 3 MILLILITER(S): at 08:51

## 2019-12-11 RX ADMIN — RISPERIDONE 1 MILLIGRAM(S): 4 TABLET ORAL at 06:32

## 2019-12-11 RX ADMIN — Medication 667 MILLIGRAM(S): at 18:13

## 2019-12-11 RX ADMIN — BENZOCAINE AND MENTHOL 1 LOZENGE: 5; 1 LIQUID ORAL at 18:13

## 2019-12-11 RX ADMIN — BENZOCAINE AND MENTHOL 1 LOZENGE: 5; 1 LIQUID ORAL at 02:58

## 2019-12-11 RX ADMIN — Medication 3 MILLILITER(S): at 02:36

## 2019-12-11 RX ADMIN — OXYCODONE AND ACETAMINOPHEN 1 TABLET(S): 5; 325 TABLET ORAL at 08:35

## 2019-12-11 RX ADMIN — SIMVASTATIN 40 MILLIGRAM(S): 20 TABLET, FILM COATED ORAL at 21:10

## 2019-12-11 NOTE — PROGRESS NOTE ADULT - ASSESSMENT
# ESRD. seen with  HD with UF goal 4.5 kilos. Free water restriction 1200 cc/hr.  #HTN controlled  # Chronic Kidney Disease Stage . elevated phos. Cont sevelamer 1600mg three times per day and phoslo as well.  # anemia of CKD. Procrit on HD   d/c planning to rehab

## 2019-12-11 NOTE — PROGRESS NOTE ADULT - ASSESSMENT
40 y/o morbidly obese male from home PMH ESRD (on MWF HD via LUE AVF at [facility] w/ Dr La), HTN, IDDM, Substance abuse, COPD (former smoker, on home oxygen 2L), Bipolar disorder, and Schizophrenia p/w shortness of breath x 1 day s/p missed HD - admitted to the ICU for acute respiratory failure 2/2 acute pulmonary edema.

## 2019-12-11 NOTE — PROGRESS NOTE ADULT - SUBJECTIVE AND OBJECTIVE BOX
PGY 1 Note discussed with supervising resident and primary attending    Patient is a 39y old  Male who presents with a chief complaint of acute pulmonary edema (10 Dec 2019 17:06)      INTERVAL HPI/OVERNIGHT EVENTS:  Patient was seen and examined at bedside, spiked temp of 101 yesterday.   MEDICATIONS  (STANDING):  albuterol/ipratropium for Nebulization 3 milliLiter(s) Nebulizer every 6 hours  benzocaine 15 mG/menthol 3.6 mG (Sugar-Free) Lozenge 1 Lozenge Oral every 4 hours  calcium acetate 667 milliGRAM(s) Oral three times a day with meals  carvedilol 6.25 milliGRAM(s) Oral every 12 hours  chlorhexidine 4% Liquid 1 Application(s) Topical daily  clopidogrel Tablet 75 milliGRAM(s) Oral daily  epoetin cyndie Injectable 67292 Unit(s) SubCutaneous <User Schedule>  heparin  Injectable 5000 Unit(s) SubCutaneous every 8 hours  hydrALAZINE 25 milliGRAM(s) Oral three times a day  insulin lispro (HumaLOG) corrective regimen sliding scale   SubCutaneous Before meals and at bedtime  isosorbide   mononitrate ER Tablet (IMDUR) 30 milliGRAM(s) Oral daily  methocarbamol 500 milliGRAM(s) Oral two times a day  mirtazapine 7.5 milliGRAM(s) Oral daily  polyethylene glycol 3350 17 Gram(s) Oral every 12 hours  risperiDONE   Tablet 1 milliGRAM(s) Oral every 12 hours  senna 2 Tablet(s) Oral at bedtime  sevelamer carbonate 1600 milliGRAM(s) Oral three times a day with meals  simvastatin 40 milliGRAM(s) Oral at bedtime    MEDICATIONS  (PRN):  acetaminophen   Tablet .. 650 milliGRAM(s) Oral every 6 hours PRN Temp greater or equal to 38C (100.4F)  guaiFENesin    Syrup 100 milliGRAM(s) Oral every 6 hours PRN Cough  oxycodone    5 mG/acetaminophen 325 mG 1 Tablet(s) Oral every 6 hours PRN Severe Pain (7 - 10)      __________________________________________________  REVIEW OF SYSTEMS:    CONSTITUTIONAL: No fever,   EYES: no acute visual disturbances  NECK: No pain or stiffness  RESPIRATORY: No cough; No shortness of breath  CARDIOVASCULAR: No chest pain, no palpitations  GASTROINTESTINAL: No pain. No nausea or vomiting; No diarrhea   NEUROLOGICAL: No headache or numbness, no tremors  MUSCULOSKELETAL: No joint pain, no muscle pain  GENITOURINARY: no dysuria, no frequency, no hesitancy  PSYCHIATRY: no depression , no anxiety  ALL OTHER  ROS negative        Vital Signs Last 24 Hrs  T(C): 36.6 (11 Dec 2019 04:55), Max: 38.3 (10 Dec 2019 16:28)  T(F): 97.9 (11 Dec 2019 04:55), Max: 101 (10 Dec 2019 16:28)  HR: 76 (11 Dec 2019 04:55) (76 - 89)  BP: 98/45 (11 Dec 2019 04:55) (98/45 - 127/63)  BP(mean): --  RR: 16 (11 Dec 2019 04:55) (16 - 17)  SpO2: 100% (11 Dec 2019 04:55) (95% - 100%)    ________________________________________________  PHYSICAL EXAM:  GENERAL: Obese Male  HEENT: Normocephalic;  conjunctivae and sclerae clear; moist mucous membranes;   NECK : supple  CHEST/LUNG: Clear to auscultation bilaterally with good air entry   HEART: S1 S2  regular; no murmurs, gallops or rubs  ABDOMEN: Soft, Nontender, Nondistended; Bowel sounds present  EXTREMITIES: no cyanosis; B/l lower extremity edema no calf tenderness  SKIN: warm and dry; no rash  NERVOUS SYSTEM:  Awake and alert; Oriented  to place, person and time ; no new deficits    _________________________________________________  LABS:              CAPILLARY BLOOD GLUCOSE      POCT Blood Glucose.: 134 mg/dL (10 Dec 2019 21:40)  POCT Blood Glucose.: 107 mg/dL (10 Dec 2019 16:52)  POCT Blood Glucose.: 150 mg/dL (10 Dec 2019 11:25)        RADIOLOGY & ADDITIONAL TESTS:    Imaging Personally Reviewed:  YES/NO    Consultant(s) Notes Reviewed:   YES/ No    Care Discussed with Consultants :     Plan of care was discussed with patient and /or primary care giver; all questions and concerns were addressed and care was aligned with patient's wishes.

## 2019-12-11 NOTE — PROGRESS NOTE ADULT - SUBJECTIVE AND OBJECTIVE BOX
[facility] : Progress Note :: [phone], (office [phone]),   Dr La / Dr Hui / Dr Barber / Dr Villalobos / Dr Hang CASTELLANO / Dr Mehta / Dr Sanchez / Dr Alber dumont  _____________________________________________________________________________________________    seen on HD. UF goal 4.5 kilos    aspirin (Short breath)  aspirin (Swelling)  fish (Unknown)  penicillin (Short breath)  penicillins (Swelling)  shellfish (Unknown)    Hospital Medications:   MEDICATIONS  (STANDING):  albuterol/ipratropium for Nebulization 3 milliLiter(s) Nebulizer every 6 hours  benzocaine 15 mG/menthol 3.6 mG (Sugar-Free) Lozenge 1 Lozenge Oral every 4 hours  calcium acetate 667 milliGRAM(s) Oral three times a day with meals  carvedilol 6.25 milliGRAM(s) Oral every 12 hours  chlorhexidine 4% Liquid 1 Application(s) Topical daily  clopidogrel Tablet 75 milliGRAM(s) Oral daily  epoetin cyndie Injectable 53216 Unit(s) SubCutaneous <User Schedule>  heparin  Injectable 5000 Unit(s) SubCutaneous every 8 hours  hydrALAZINE 25 milliGRAM(s) Oral three times a day  insulin lispro (HumaLOG) corrective regimen sliding scale   SubCutaneous Before meals and at bedtime  isosorbide   mononitrate ER Tablet (IMDUR) 30 milliGRAM(s) Oral daily  methocarbamol 500 milliGRAM(s) Oral two times a day  mirtazapine 7.5 milliGRAM(s) Oral daily  polyethylene glycol 3350 17 Gram(s) Oral every 12 hours  risperiDONE   Tablet 1 milliGRAM(s) Oral every 12 hours  senna 2 Tablet(s) Oral at bedtime  sevelamer carbonate 1600 milliGRAM(s) Oral three times a day with meals  simvastatin 40 milliGRAM(s) Oral at bedtime        VITALS:  T(F): 98.1 (12-11-19 @ 13:20), Max: 101 (12-10-19 @ 16:28)  HR: 76 (12-11-19 @ 04:55)  BP: 112/64 (12-11-19 @ 13:20)  RR: 18 (12-11-19 @ 13:20)  SpO2: 100% (12-11-19 @ 13:20)  Wt(kg): --      PHYSICAL EXAM:  Constitutional: NAD  HEENT: anicteric sclera, oropharynx clear.  Neck: No JVD  Respiratory: CTAB, no wheezes, rales or rhonchi  Cardiovascular: S1, S2, RRR  Gastrointestinal: BS+, soft,ascites+  Extremities:  peripheral edema++. Chronic stasis changes  Vascular Access: AVF cannulated    LABS:  12-11    132<L>  |  94<L>  |  78<H>  ----------------------------<  115<H>  5.1   |  27  |  9.03<H>    Ca    9.3      11 Dec 2019 13:32  Phos  7.0     12-11  Mg     2.6     12-11      Creatinine Trend: 9.03 <--, 9.33 <--, 7.87 <--, 8.46 <--, 7.01 <--, 8.69 <--                        8.3    9.66  )-----------( 326      ( 11 Dec 2019 13:32 )             28.9     Urine Studies:      RADIOLOGY & ADDITIONAL STUDIES:

## 2019-12-12 DIAGNOSIS — N39.0 URINARY TRACT INFECTION, SITE NOT SPECIFIED: ICD-10-CM

## 2019-12-12 LAB
ANION GAP SERPL CALC-SCNC: 7 MMOL/L — SIGNIFICANT CHANGE UP (ref 5–17)
APPEARANCE UR: CLEAR — SIGNIFICANT CHANGE UP
BASOPHILS # BLD AUTO: 0.03 K/UL — SIGNIFICANT CHANGE UP (ref 0–0.2)
BASOPHILS NFR BLD AUTO: 0.3 % — SIGNIFICANT CHANGE UP (ref 0–2)
BILIRUB UR-MCNC: NEGATIVE — SIGNIFICANT CHANGE UP
BUN SERPL-MCNC: 61 MG/DL — HIGH (ref 7–18)
CALCIUM SERPL-MCNC: 9.2 MG/DL — SIGNIFICANT CHANGE UP (ref 8.4–10.5)
CHLORIDE SERPL-SCNC: 95 MMOL/L — LOW (ref 96–108)
CO2 SERPL-SCNC: 28 MMOL/L — SIGNIFICANT CHANGE UP (ref 22–31)
COLOR SPEC: YELLOW — SIGNIFICANT CHANGE UP
CREAT SERPL-MCNC: 7.36 MG/DL — HIGH (ref 0.5–1.3)
CULTURE RESULTS: SIGNIFICANT CHANGE UP
CULTURE RESULTS: SIGNIFICANT CHANGE UP
DIFF PNL FLD: ABNORMAL
EOSINOPHIL # BLD AUTO: 0.42 K/UL — SIGNIFICANT CHANGE UP (ref 0–0.5)
EOSINOPHIL NFR BLD AUTO: 3.9 % — SIGNIFICANT CHANGE UP (ref 0–6)
EPI CELLS # UR: ABNORMAL /HPF
GLUCOSE BLDC GLUCOMTR-MCNC: 119 MG/DL — HIGH (ref 70–99)
GLUCOSE BLDC GLUCOMTR-MCNC: 120 MG/DL — HIGH (ref 70–99)
GLUCOSE BLDC GLUCOMTR-MCNC: 130 MG/DL — HIGH (ref 70–99)
GLUCOSE BLDC GLUCOMTR-MCNC: 136 MG/DL — HIGH (ref 70–99)
GLUCOSE SERPL-MCNC: 122 MG/DL — HIGH (ref 70–99)
GLUCOSE UR QL: 250
HCT VFR BLD CALC: 28 % — LOW (ref 39–50)
HGB BLD-MCNC: 8.1 G/DL — LOW (ref 13–17)
IMM GRANULOCYTES NFR BLD AUTO: 0.6 % — SIGNIFICANT CHANGE UP (ref 0–1.5)
KETONES UR-MCNC: NEGATIVE — SIGNIFICANT CHANGE UP
LEUKOCYTE ESTERASE UR-ACNC: ABNORMAL
LYMPHOCYTES # BLD AUTO: 0.61 K/UL — LOW (ref 1–3.3)
LYMPHOCYTES # BLD AUTO: 5.6 % — LOW (ref 13–44)
MAGNESIUM SERPL-MCNC: 2.6 MG/DL — SIGNIFICANT CHANGE UP (ref 1.6–2.6)
MCHC RBC-ENTMCNC: 27.6 PG — SIGNIFICANT CHANGE UP (ref 27–34)
MCHC RBC-ENTMCNC: 28.9 GM/DL — LOW (ref 32–36)
MCV RBC AUTO: 95.2 FL — SIGNIFICANT CHANGE UP (ref 80–100)
MONOCYTES # BLD AUTO: 1.36 K/UL — HIGH (ref 0–0.9)
MONOCYTES NFR BLD AUTO: 12.6 % — SIGNIFICANT CHANGE UP (ref 2–14)
NEUTROPHILS # BLD AUTO: 8.31 K/UL — HIGH (ref 1.8–7.4)
NEUTROPHILS NFR BLD AUTO: 77 % — SIGNIFICANT CHANGE UP (ref 43–77)
NITRITE UR-MCNC: NEGATIVE — SIGNIFICANT CHANGE UP
NRBC # BLD: 0 /100 WBCS — SIGNIFICANT CHANGE UP (ref 0–0)
PH UR: 8 — SIGNIFICANT CHANGE UP (ref 5–8)
PHOSPHATE SERPL-MCNC: 6.2 MG/DL — HIGH (ref 2.5–4.5)
PLATELET # BLD AUTO: 337 K/UL — SIGNIFICANT CHANGE UP (ref 150–400)
POTASSIUM SERPL-MCNC: 4.7 MMOL/L — SIGNIFICANT CHANGE UP (ref 3.5–5.3)
POTASSIUM SERPL-SCNC: 4.7 MMOL/L — SIGNIFICANT CHANGE UP (ref 3.5–5.3)
PROT UR-MCNC: 100
RBC # BLD: 2.94 M/UL — LOW (ref 4.2–5.8)
RBC # FLD: 17.6 % — HIGH (ref 10.3–14.5)
RBC CASTS # UR COMP ASSIST: ABNORMAL /HPF (ref 0–2)
SODIUM SERPL-SCNC: 130 MMOL/L — LOW (ref 135–145)
SP GR SPEC: 1.01 — SIGNIFICANT CHANGE UP (ref 1.01–1.02)
SPECIMEN SOURCE: SIGNIFICANT CHANGE UP
SPECIMEN SOURCE: SIGNIFICANT CHANGE UP
UROBILINOGEN FLD QL: NEGATIVE — SIGNIFICANT CHANGE UP
WBC # BLD: 10.8 K/UL — HIGH (ref 3.8–10.5)
WBC # FLD AUTO: 10.8 K/UL — HIGH (ref 3.8–10.5)
WBC UR QL: ABNORMAL /HPF (ref 0–5)

## 2019-12-12 PROCEDURE — 99232 SBSQ HOSP IP/OBS MODERATE 35: CPT | Mod: GC

## 2019-12-12 RX ORDER — CEFTRIAXONE 500 MG/1
1000 INJECTION, POWDER, FOR SOLUTION INTRAMUSCULAR; INTRAVENOUS EVERY 24 HOURS
Refills: 0 | Status: DISCONTINUED | OUTPATIENT
Start: 2019-12-12 | End: 2019-12-13

## 2019-12-12 RX ADMIN — SEVELAMER CARBONATE 1600 MILLIGRAM(S): 2400 POWDER, FOR SUSPENSION ORAL at 07:50

## 2019-12-12 RX ADMIN — CARVEDILOL PHOSPHATE 6.25 MILLIGRAM(S): 80 CAPSULE, EXTENDED RELEASE ORAL at 05:26

## 2019-12-12 RX ADMIN — Medication 3 MILLILITER(S): at 21:03

## 2019-12-12 RX ADMIN — BENZOCAINE AND MENTHOL 1 LOZENGE: 5; 1 LIQUID ORAL at 05:28

## 2019-12-12 RX ADMIN — Medication 25 MILLIGRAM(S): at 21:47

## 2019-12-12 RX ADMIN — Medication 667 MILLIGRAM(S): at 17:09

## 2019-12-12 RX ADMIN — CARVEDILOL PHOSPHATE 6.25 MILLIGRAM(S): 80 CAPSULE, EXTENDED RELEASE ORAL at 17:11

## 2019-12-12 RX ADMIN — SEVELAMER CARBONATE 1600 MILLIGRAM(S): 2400 POWDER, FOR SUSPENSION ORAL at 17:10

## 2019-12-12 RX ADMIN — OXYCODONE AND ACETAMINOPHEN 1 TABLET(S): 5; 325 TABLET ORAL at 19:22

## 2019-12-12 RX ADMIN — OXYCODONE AND ACETAMINOPHEN 1 TABLET(S): 5; 325 TABLET ORAL at 11:10

## 2019-12-12 RX ADMIN — SEVELAMER CARBONATE 1600 MILLIGRAM(S): 2400 POWDER, FOR SUSPENSION ORAL at 11:46

## 2019-12-12 RX ADMIN — SENNA PLUS 2 TABLET(S): 8.6 TABLET ORAL at 21:47

## 2019-12-12 RX ADMIN — POLYETHYLENE GLYCOL 3350 17 GRAM(S): 17 POWDER, FOR SOLUTION ORAL at 17:12

## 2019-12-12 RX ADMIN — BENZOCAINE AND MENTHOL 1 LOZENGE: 5; 1 LIQUID ORAL at 11:45

## 2019-12-12 RX ADMIN — METHOCARBAMOL 500 MILLIGRAM(S): 500 TABLET, FILM COATED ORAL at 05:26

## 2019-12-12 RX ADMIN — CEFTRIAXONE 100 MILLIGRAM(S): 500 INJECTION, POWDER, FOR SOLUTION INTRAMUSCULAR; INTRAVENOUS at 11:44

## 2019-12-12 RX ADMIN — Medication 3 MILLILITER(S): at 14:54

## 2019-12-12 RX ADMIN — HEPARIN SODIUM 5000 UNIT(S): 5000 INJECTION INTRAVENOUS; SUBCUTANEOUS at 14:39

## 2019-12-12 RX ADMIN — Medication 667 MILLIGRAM(S): at 11:46

## 2019-12-12 RX ADMIN — ISOSORBIDE MONONITRATE 30 MILLIGRAM(S): 60 TABLET, EXTENDED RELEASE ORAL at 11:49

## 2019-12-12 RX ADMIN — SIMVASTATIN 40 MILLIGRAM(S): 20 TABLET, FILM COATED ORAL at 21:47

## 2019-12-12 RX ADMIN — MIRTAZAPINE 7.5 MILLIGRAM(S): 45 TABLET, ORALLY DISINTEGRATING ORAL at 11:47

## 2019-12-12 RX ADMIN — OXYCODONE AND ACETAMINOPHEN 1 TABLET(S): 5; 325 TABLET ORAL at 20:21

## 2019-12-12 RX ADMIN — Medication 667 MILLIGRAM(S): at 07:49

## 2019-12-12 RX ADMIN — CHLORHEXIDINE GLUCONATE 1 APPLICATION(S): 213 SOLUTION TOPICAL at 11:45

## 2019-12-12 RX ADMIN — Medication 25 MILLIGRAM(S): at 14:39

## 2019-12-12 RX ADMIN — HEPARIN SODIUM 5000 UNIT(S): 5000 INJECTION INTRAVENOUS; SUBCUTANEOUS at 05:26

## 2019-12-12 RX ADMIN — RISPERIDONE 1 MILLIGRAM(S): 4 TABLET ORAL at 17:10

## 2019-12-12 RX ADMIN — CLOPIDOGREL BISULFATE 75 MILLIGRAM(S): 75 TABLET, FILM COATED ORAL at 11:46

## 2019-12-12 RX ADMIN — Medication 25 MILLIGRAM(S): at 05:26

## 2019-12-12 RX ADMIN — HEPARIN SODIUM 5000 UNIT(S): 5000 INJECTION INTRAVENOUS; SUBCUTANEOUS at 21:47

## 2019-12-12 RX ADMIN — BENZOCAINE AND MENTHOL 1 LOZENGE: 5; 1 LIQUID ORAL at 14:46

## 2019-12-12 RX ADMIN — BENZOCAINE AND MENTHOL 1 LOZENGE: 5; 1 LIQUID ORAL at 17:14

## 2019-12-12 RX ADMIN — RISPERIDONE 1 MILLIGRAM(S): 4 TABLET ORAL at 05:26

## 2019-12-12 RX ADMIN — BENZOCAINE AND MENTHOL 1 LOZENGE: 5; 1 LIQUID ORAL at 21:16

## 2019-12-12 RX ADMIN — Medication 3 MILLILITER(S): at 08:29

## 2019-12-12 RX ADMIN — METHOCARBAMOL 500 MILLIGRAM(S): 500 TABLET, FILM COATED ORAL at 17:11

## 2019-12-12 RX ADMIN — OXYCODONE AND ACETAMINOPHEN 1 TABLET(S): 5; 325 TABLET ORAL at 09:58

## 2019-12-12 NOTE — PROGRESS NOTE ADULT - PROBLEM SELECTOR PLAN 2
Patients UA is positive damico Leukocyte esterase and WBCs  -will send the urine cultures and start Rocephin

## 2019-12-12 NOTE — PROGRESS NOTE ADULT - SUBJECTIVE AND OBJECTIVE BOX
[facility] : Progress Note :: [phone], (office [phone]),   Dr La / Dr Hui / Dr Barber / Dr Villalobos / Dr aHng CASTELLANO / Dr Mehta / Dr Sanchez / Dr Alber dumont  _____________________________________________________________________________________________    s/p HD yesterday    aspirin (Short breath)  aspirin (Swelling)  fish (Unknown)  penicillin (Short breath)  penicillins (Swelling)  shellfish (Unknown)    Hospital Medications:   MEDICATIONS  (STANDING):  albuterol/ipratropium for Nebulization 3 milliLiter(s) Nebulizer every 6 hours  benzocaine 15 mG/menthol 3.6 mG (Sugar-Free) Lozenge 1 Lozenge Oral every 4 hours  calcium acetate 667 milliGRAM(s) Oral three times a day with meals  carvedilol 6.25 milliGRAM(s) Oral every 12 hours  cefTRIAXone   IVPB 1000 milliGRAM(s) IV Intermittent every 24 hours  chlorhexidine 4% Liquid 1 Application(s) Topical daily  clopidogrel Tablet 75 milliGRAM(s) Oral daily  epoetin cyndie Injectable 77609 Unit(s) SubCutaneous <User Schedule>  heparin  Injectable 5000 Unit(s) SubCutaneous every 8 hours  hydrALAZINE 25 milliGRAM(s) Oral three times a day  insulin lispro (HumaLOG) corrective regimen sliding scale   SubCutaneous Before meals and at bedtime  isosorbide   mononitrate ER Tablet (IMDUR) 30 milliGRAM(s) Oral daily  methocarbamol 500 milliGRAM(s) Oral two times a day  mirtazapine 7.5 milliGRAM(s) Oral daily  polyethylene glycol 3350 17 Gram(s) Oral every 12 hours  risperiDONE   Tablet 1 milliGRAM(s) Oral every 12 hours  senna 2 Tablet(s) Oral at bedtime  sevelamer carbonate 1600 milliGRAM(s) Oral three times a day with meals  simvastatin 40 milliGRAM(s) Oral at bedtime      VITALS:  T(F): 98.3 (19 @ 05:00), Max: 100.6 (19 @ 20:19)  HR: 80 (19 @ 05:00)  BP: 131/70 (19 @ 05:00)  RR: 18 (19 @ 05:00)  SpO2: 100% (19 @ 05:00)  Wt(kg): --      PHYSICAL EXAM:  Constitutional: NAD  HEENT: anicteric sclera, oropharynx man.  Neck: No JVD  Respiratory: CTAB, no wheezes, rales or rhonchi  Cardiovascular: S1, S2, RRR  Gastrointestinal: BS+, soft, NT/ND  Extremities: peripheral edema++  Neurological: A/O x 3, no focal deficits  : No CVA tenderness. No hernandez.   Skin: No rashes  Vascular Access: AVF with thrill and bruit    LABS:      130<L>  |  95<L>  |  61<H>  ----------------------------<  122<H>  4.7   |  28  |  7.36<H>    Ca    9.2      12 Dec 2019 07:46  Phos  6.2       Mg     2.6           Creatinine Trend: 7.36 <--, 9.03 <--, 9.33 <--, 7.87 <--, 8.46 <--, 7.01 <--, 8.69 <--                        8.1    10.80 )-----------( 337      ( 12 Dec 2019 07:46 )             28.0     Urine Studies:  Urinalysis Basic - ( 12 Dec 2019 01:21 )    Color: Yellow / Appearance: Clear / S.010 / pH:   Gluc:  / Ketone: Negative  / Bili: Negative / Urobili: Negative   Blood:  / Protein: 100 / Nitrite: Negative   Leuk Esterase: Moderate / RBC: 2-5 /HPF / WBC 11-25 /HPF   Sq Epi:  / Non Sq Epi: Many /HPF / Bacteria:         RADIOLOGY & ADDITIONAL STUDIES:

## 2019-12-12 NOTE — PROGRESS NOTE ADULT - ASSESSMENT
# ESRD.  HD in AM  with UF goal 4-5 kilos. Free water restriction 1200 cc/hr.  #HTN controlled  # Chronic Kidney Disease Stage . elevated phos. Cont sevelamer 1600mg three times per day and phoslo as well.  # anemia of CKD. Procrit on HD   d/c planning to rehab

## 2019-12-12 NOTE — PROGRESS NOTE ADULT - ASSESSMENT
38 y/o morbidly obese male from home PMH ESRD (on MWF HD via LUE AVF at [facility] w/ Dr La), HTN, IDDM, Substance abuse, COPD (former smoker, on home oxygen 2L), Bipolar disorder, and Schizophrenia p/w shortness of breath x 1 day s/p missed HD - admitted to the ICU for acute respiratory failure 2/2 acute pulmonary edema.

## 2019-12-13 ENCOUNTER — TRANSCRIPTION ENCOUNTER (OUTPATIENT)
Age: 39
End: 2019-12-13

## 2019-12-13 VITALS
DIASTOLIC BLOOD PRESSURE: 50 MMHG | TEMPERATURE: 99 F | SYSTOLIC BLOOD PRESSURE: 122 MMHG | OXYGEN SATURATION: 100 % | WEIGHT: 315 LBS | RESPIRATION RATE: 19 BRPM | HEART RATE: 80 BPM

## 2019-12-13 LAB
ANION GAP SERPL CALC-SCNC: 9 MMOL/L — SIGNIFICANT CHANGE UP (ref 5–17)
BASOPHILS # BLD AUTO: 0.03 K/UL — SIGNIFICANT CHANGE UP (ref 0–0.2)
BASOPHILS NFR BLD AUTO: 0.3 % — SIGNIFICANT CHANGE UP (ref 0–2)
BUN SERPL-MCNC: 74 MG/DL — HIGH (ref 7–18)
CALCIUM SERPL-MCNC: 8.7 MG/DL — SIGNIFICANT CHANGE UP (ref 8.4–10.5)
CHLORIDE SERPL-SCNC: 95 MMOL/L — LOW (ref 96–108)
CO2 SERPL-SCNC: 28 MMOL/L — SIGNIFICANT CHANGE UP (ref 22–31)
CREAT SERPL-MCNC: 9.25 MG/DL — HIGH (ref 0.5–1.3)
EOSINOPHIL # BLD AUTO: 0.49 K/UL — SIGNIFICANT CHANGE UP (ref 0–0.5)
EOSINOPHIL NFR BLD AUTO: 4.2 % — SIGNIFICANT CHANGE UP (ref 0–6)
GLUCOSE BLDC GLUCOMTR-MCNC: 107 MG/DL — HIGH (ref 70–99)
GLUCOSE BLDC GLUCOMTR-MCNC: 109 MG/DL — HIGH (ref 70–99)
GLUCOSE BLDC GLUCOMTR-MCNC: 110 MG/DL — HIGH (ref 70–99)
GLUCOSE BLDC GLUCOMTR-MCNC: 111 MG/DL — HIGH (ref 70–99)
GLUCOSE BLDC GLUCOMTR-MCNC: 117 MG/DL — HIGH (ref 70–99)
GLUCOSE SERPL-MCNC: 126 MG/DL — HIGH (ref 70–99)
HCT VFR BLD CALC: 27.7 % — LOW (ref 39–50)
HGB BLD-MCNC: 8.1 G/DL — LOW (ref 13–17)
IMM GRANULOCYTES NFR BLD AUTO: 0.5 % — SIGNIFICANT CHANGE UP (ref 0–1.5)
LYMPHOCYTES # BLD AUTO: 0.65 K/UL — LOW (ref 1–3.3)
LYMPHOCYTES # BLD AUTO: 5.6 % — LOW (ref 13–44)
MAGNESIUM SERPL-MCNC: 2.7 MG/DL — HIGH (ref 1.6–2.6)
MCHC RBC-ENTMCNC: 27.6 PG — SIGNIFICANT CHANGE UP (ref 27–34)
MCHC RBC-ENTMCNC: 29.2 GM/DL — LOW (ref 32–36)
MCV RBC AUTO: 94.2 FL — SIGNIFICANT CHANGE UP (ref 80–100)
MONOCYTES # BLD AUTO: 1.2 K/UL — HIGH (ref 0–0.9)
MONOCYTES NFR BLD AUTO: 10.3 % — SIGNIFICANT CHANGE UP (ref 2–14)
NEUTROPHILS # BLD AUTO: 9.22 K/UL — HIGH (ref 1.8–7.4)
NEUTROPHILS NFR BLD AUTO: 79.1 % — HIGH (ref 43–77)
NRBC # BLD: 0 /100 WBCS — SIGNIFICANT CHANGE UP (ref 0–0)
PHOSPHATE SERPL-MCNC: 6.7 MG/DL — HIGH (ref 2.5–4.5)
PLATELET # BLD AUTO: 377 K/UL — SIGNIFICANT CHANGE UP (ref 150–400)
POTASSIUM SERPL-MCNC: 5 MMOL/L — SIGNIFICANT CHANGE UP (ref 3.5–5.3)
POTASSIUM SERPL-SCNC: 5 MMOL/L — SIGNIFICANT CHANGE UP (ref 3.5–5.3)
RBC # BLD: 2.94 M/UL — LOW (ref 4.2–5.8)
RBC # FLD: 17.7 % — HIGH (ref 10.3–14.5)
SODIUM SERPL-SCNC: 132 MMOL/L — LOW (ref 135–145)
WBC # BLD: 11.65 K/UL — HIGH (ref 3.8–10.5)
WBC # FLD AUTO: 11.65 K/UL — HIGH (ref 3.8–10.5)

## 2019-12-13 PROCEDURE — 83880 ASSAY OF NATRIURETIC PEPTIDE: CPT

## 2019-12-13 PROCEDURE — 94640 AIRWAY INHALATION TREATMENT: CPT

## 2019-12-13 PROCEDURE — 71045 X-RAY EXAM CHEST 1 VIEW: CPT

## 2019-12-13 PROCEDURE — 96374 THER/PROPH/DIAG INJ IV PUSH: CPT

## 2019-12-13 PROCEDURE — 80074 ACUTE HEPATITIS PANEL: CPT

## 2019-12-13 PROCEDURE — 87040 BLOOD CULTURE FOR BACTERIA: CPT

## 2019-12-13 PROCEDURE — 80053 COMPREHEN METABOLIC PANEL: CPT

## 2019-12-13 PROCEDURE — 99285 EMERGENCY DEPT VISIT HI MDM: CPT | Mod: 25

## 2019-12-13 PROCEDURE — 85027 COMPLETE CBC AUTOMATED: CPT

## 2019-12-13 PROCEDURE — 94660 CPAP INITIATION&MGMT: CPT

## 2019-12-13 PROCEDURE — 74176 CT ABD & PELVIS W/O CONTRAST: CPT

## 2019-12-13 PROCEDURE — 80307 DRUG TEST PRSMV CHEM ANLYZR: CPT

## 2019-12-13 PROCEDURE — 82550 ASSAY OF CK (CPK): CPT

## 2019-12-13 PROCEDURE — 82553 CREATINE MB FRACTION: CPT

## 2019-12-13 PROCEDURE — 83735 ASSAY OF MAGNESIUM: CPT

## 2019-12-13 PROCEDURE — 82962 GLUCOSE BLOOD TEST: CPT

## 2019-12-13 PROCEDURE — 99261: CPT

## 2019-12-13 PROCEDURE — 80048 BASIC METABOLIC PNL TOTAL CA: CPT

## 2019-12-13 PROCEDURE — 84100 ASSAY OF PHOSPHORUS: CPT

## 2019-12-13 PROCEDURE — 85730 THROMBOPLASTIN TIME PARTIAL: CPT

## 2019-12-13 PROCEDURE — 96375 TX/PRO/DX INJ NEW DRUG ADDON: CPT

## 2019-12-13 PROCEDURE — 83690 ASSAY OF LIPASE: CPT

## 2019-12-13 PROCEDURE — 86480 TB TEST CELL IMMUN MEASURE: CPT

## 2019-12-13 PROCEDURE — 81001 URINALYSIS AUTO W/SCOPE: CPT

## 2019-12-13 PROCEDURE — 97530 THERAPEUTIC ACTIVITIES: CPT

## 2019-12-13 PROCEDURE — 87340 HEPATITIS B SURFACE AG IA: CPT

## 2019-12-13 PROCEDURE — 99239 HOSP IP/OBS DSCHRG MGMT >30: CPT

## 2019-12-13 PROCEDURE — 87086 URINE CULTURE/COLONY COUNT: CPT

## 2019-12-13 PROCEDURE — 85610 PROTHROMBIN TIME: CPT

## 2019-12-13 PROCEDURE — 93005 ELECTROCARDIOGRAM TRACING: CPT

## 2019-12-13 PROCEDURE — 87186 SC STD MICRODIL/AGAR DIL: CPT

## 2019-12-13 PROCEDURE — 82803 BLOOD GASES ANY COMBINATION: CPT

## 2019-12-13 PROCEDURE — 84484 ASSAY OF TROPONIN QUANT: CPT

## 2019-12-13 PROCEDURE — 36415 COLL VENOUS BLD VENIPUNCTURE: CPT

## 2019-12-13 RX ORDER — VANCOMYCIN HCL 1 G
500 VIAL (EA) INTRAVENOUS ONCE
Refills: 0 | Status: COMPLETED | OUTPATIENT
Start: 2019-12-13 | End: 2019-12-13

## 2019-12-13 RX ORDER — ERYTHROPOIETIN 10000 [IU]/ML
10000 INJECTION, SOLUTION INTRAVENOUS; SUBCUTANEOUS
Qty: 1 | Refills: 0
Start: 2019-12-13 | End: 2020-01-11

## 2019-12-13 RX ORDER — CEFUROXIME AXETIL 250 MG
1 TABLET ORAL
Qty: 6 | Refills: 0
Start: 2019-12-13 | End: 2019-12-15

## 2019-12-13 RX ORDER — CALCIUM ACETATE 667 MG
1 TABLET ORAL
Qty: 90 | Refills: 0
Start: 2019-12-13 | End: 2020-01-11

## 2019-12-13 RX ORDER — SEVELAMER CARBONATE 2400 MG/1
2 POWDER, FOR SUSPENSION ORAL
Qty: 180 | Refills: 0
Start: 2019-12-13 | End: 2020-01-11

## 2019-12-13 RX ORDER — VANCOMYCIN HCL 1 G
500 VIAL (EA) INTRAVENOUS
Qty: 2000 | Refills: 0
Start: 2019-12-13 | End: 2019-12-16

## 2019-12-13 RX ORDER — VANCOMYCIN HCL 1 G
500 VIAL (EA) INTRAVENOUS
Qty: 2 | Refills: 0
Start: 2019-12-13 | End: 2019-12-17

## 2019-12-13 RX ADMIN — CARVEDILOL PHOSPHATE 6.25 MILLIGRAM(S): 80 CAPSULE, EXTENDED RELEASE ORAL at 05:31

## 2019-12-13 RX ADMIN — OXYCODONE AND ACETAMINOPHEN 1 TABLET(S): 5; 325 TABLET ORAL at 12:40

## 2019-12-13 RX ADMIN — BENZOCAINE AND MENTHOL 1 LOZENGE: 5; 1 LIQUID ORAL at 05:31

## 2019-12-13 RX ADMIN — HEPARIN SODIUM 5000 UNIT(S): 5000 INJECTION INTRAVENOUS; SUBCUTANEOUS at 05:31

## 2019-12-13 RX ADMIN — CHLORHEXIDINE GLUCONATE 1 APPLICATION(S): 213 SOLUTION TOPICAL at 12:44

## 2019-12-13 RX ADMIN — SEVELAMER CARBONATE 1600 MILLIGRAM(S): 2400 POWDER, FOR SUSPENSION ORAL at 08:32

## 2019-12-13 RX ADMIN — CEFTRIAXONE 100 MILLIGRAM(S): 500 INJECTION, POWDER, FOR SOLUTION INTRAMUSCULAR; INTRAVENOUS at 12:43

## 2019-12-13 RX ADMIN — MIRTAZAPINE 7.5 MILLIGRAM(S): 45 TABLET, ORALLY DISINTEGRATING ORAL at 12:44

## 2019-12-13 RX ADMIN — SENNA PLUS 2 TABLET(S): 8.6 TABLET ORAL at 21:02

## 2019-12-13 RX ADMIN — SIMVASTATIN 40 MILLIGRAM(S): 20 TABLET, FILM COATED ORAL at 21:02

## 2019-12-13 RX ADMIN — CLOPIDOGREL BISULFATE 75 MILLIGRAM(S): 75 TABLET, FILM COATED ORAL at 12:44

## 2019-12-13 RX ADMIN — SEVELAMER CARBONATE 1600 MILLIGRAM(S): 2400 POWDER, FOR SUSPENSION ORAL at 12:42

## 2019-12-13 RX ADMIN — BENZOCAINE AND MENTHOL 1 LOZENGE: 5; 1 LIQUID ORAL at 01:41

## 2019-12-13 RX ADMIN — Medication 667 MILLIGRAM(S): at 08:32

## 2019-12-13 RX ADMIN — Medication 25 MILLIGRAM(S): at 21:02

## 2019-12-13 RX ADMIN — ERYTHROPOIETIN 10000 UNIT(S): 10000 INJECTION, SOLUTION INTRAVENOUS; SUBCUTANEOUS at 15:21

## 2019-12-13 RX ADMIN — Medication 3 MILLILITER(S): at 08:36

## 2019-12-13 RX ADMIN — Medication 667 MILLIGRAM(S): at 18:00

## 2019-12-13 RX ADMIN — OXYCODONE AND ACETAMINOPHEN 1 TABLET(S): 5; 325 TABLET ORAL at 13:40

## 2019-12-13 RX ADMIN — BENZOCAINE AND MENTHOL 1 LOZENGE: 5; 1 LIQUID ORAL at 21:03

## 2019-12-13 RX ADMIN — Medication 667 MILLIGRAM(S): at 12:46

## 2019-12-13 RX ADMIN — CARVEDILOL PHOSPHATE 6.25 MILLIGRAM(S): 80 CAPSULE, EXTENDED RELEASE ORAL at 18:00

## 2019-12-13 RX ADMIN — RISPERIDONE 1 MILLIGRAM(S): 4 TABLET ORAL at 18:00

## 2019-12-13 RX ADMIN — ISOSORBIDE MONONITRATE 30 MILLIGRAM(S): 60 TABLET, EXTENDED RELEASE ORAL at 12:45

## 2019-12-13 RX ADMIN — METHOCARBAMOL 500 MILLIGRAM(S): 500 TABLET, FILM COATED ORAL at 05:31

## 2019-12-13 RX ADMIN — OXYCODONE AND ACETAMINOPHEN 1 TABLET(S): 5; 325 TABLET ORAL at 19:03

## 2019-12-13 RX ADMIN — OXYCODONE AND ACETAMINOPHEN 1 TABLET(S): 5; 325 TABLET ORAL at 01:40

## 2019-12-13 RX ADMIN — OXYCODONE AND ACETAMINOPHEN 1 TABLET(S): 5; 325 TABLET ORAL at 02:35

## 2019-12-13 RX ADMIN — METHOCARBAMOL 500 MILLIGRAM(S): 500 TABLET, FILM COATED ORAL at 18:00

## 2019-12-13 RX ADMIN — Medication 3 MILLILITER(S): at 20:50

## 2019-12-13 RX ADMIN — SEVELAMER CARBONATE 1600 MILLIGRAM(S): 2400 POWDER, FOR SUSPENSION ORAL at 18:00

## 2019-12-13 RX ADMIN — RISPERIDONE 1 MILLIGRAM(S): 4 TABLET ORAL at 05:32

## 2019-12-13 RX ADMIN — OXYCODONE AND ACETAMINOPHEN 1 TABLET(S): 5; 325 TABLET ORAL at 20:00

## 2019-12-13 RX ADMIN — Medication 25 MILLIGRAM(S): at 05:31

## 2019-12-13 RX ADMIN — Medication 100 MILLIGRAM(S): at 16:31

## 2019-12-13 NOTE — PROGRESS NOTE ADULT - NSHPATTENDINGPLANDISCUSS_GEN_ALL_CORE
Dr. Rasmussen, Neurology
Dr. Hu
Patient, Dr. Rasmussen
Dr. Hu
Dr. Hu, RN, senior resident, patient
Dr. Hu, Nephrology

## 2019-12-13 NOTE — PROGRESS NOTE ADULT - PROBLEM SELECTOR PROBLEM 7
Morbid obesity with BMI of 40.0-44.9, adult
Morbid obesity with BMI of 40.0-44.9, adult
Prophylactic measure

## 2019-12-13 NOTE — PROGRESS NOTE ADULT - REASON FOR ADMISSION
acute pulmonary edema

## 2019-12-13 NOTE — PROGRESS NOTE ADULT - ATTENDING COMMENTS
Acute respiratory failure secondary to fluid overload from noncompliance with HD  ESRD (On HD)   Severe KRYSTIN  Chronic cocaine use  Severe KRYSTIN- noncompliant with CPAP titration follow up  Polysubstance abuse with drug    CXR and respiratory status improved with HD  OOB to chair  D/C BiPAP as pt is refusing  Tox screen negative this admission  Follow up renal for more fluid removal  He doesn't have COPD  Prior outpatient PFTs showed restrictive lung disease likely from his obesity  On bronchodilators for possible asthma though some of his bronchospasm likely from intermittent cocaine use- he was positive for cocaine multiple admissions  DVT prophylaxis
Acute respiratory failure secondary to fluid overload from noncompliance with HD  ESRD (On HD)   Severe KRYSTIN  Chronic cocaine use  Severe KRYSTIN- noncompliant with CPAP titration follow up  Polysubstance abuse with drug    Plan:  Respiratory staut simproved  OOB to chair  Non complaint with Bipap, will d/c order  Tox screen negative this admission  HD per nephrology  Prior outpatient PFTs showed restrictive lung disease likely from his obesity  On bronchodilators for possible asthma though some of his bronchospasm likely from intermittent cocaine use- he was positive for cocaine multiple admissions  DVT prophylaxis  Transfer out of ICU today
Acute respiratory failure secondary to fluid overload from noncompliance with HD  ESRD (On HD)   Severe KRYSTIN  Chronic cocaine use  Severe KRYSTIN- noncompliant with CPAP titration follow up  Polysubstance abuse with drug    Plan:  Non compliant with diet, as per nurse getting food from outside.  Discussed risks of dietary non compliance with patient as at this time his potassium level is elevated  At bedside noted with peanut butter cookies.   Tox screen negative this admission  HD per nephrology  Prior outpatient PFTs showed restrictive lung disease likely from his obesity  On bronchodilators for possible asthma though some of his bronchospasm likely from intermittent cocaine use- he was positive for cocaine multiple admissions. D/c steroids  DVT prophylaxis  PT evaluation  Transfer out of ICU today
Patient was examined and discussed with Dr. Rasmussen.     He c/o inability to move his left leg.  I have asked Neurology to evaluate patient.  Recommends MRI of the lumbar and sacral spine.   Will attempt to get done ASAP.  If no neurosurgical intervention is indicated, will refer to [facility]
Patient was examined and discussed with Dr. Hu.     He c/o feeling chilled.     Vital Signs Last 24 Hrs  T(C): 37.1 (11 Dec 2019 17:37), Max: 37.3 (10 Dec 2019 20:25)  T(F): 98.7 (11 Dec 2019 17:37), Max: 99.2 (10 Dec 2019 20:25)  HR: 87 (11 Dec 2019 17:37) (76 - 88)  BP: 140/65 (11 Dec 2019 17:37) (98/45 - 140/65)  BP(mean): --  RR: 18 (11 Dec 2019 17:37) (16 - 18)  SpO2: 100% (11 Dec 2019 17:37) (95% - 100%)  including fever last night on rectal temperature.   Lungs, clear  Cor, RRR I/VI systolic murmur  Abdomen, obese                        8.3    9.66  )-----------( 326      ( 11 Dec 2019 13:32 )             28.9   12-11    132<L>  |  94<L>  |  78<H>  ----------------------------<  115<H>  5.1   |  27  |  9.03<H>    Ca    9.3      11 Dec 2019 13:32  Phos  7.0     12-11  Mg     2.6     12-11    IMP:  Fever.  Patient has DM and ESRD and is on dialysis.  Concern for endovascular infection.   Plan:  Fever w/u, including blood cultures x 2, u/a and C & S.  After cultures are drawn, will give empirical vancomycin x 1, based on weight.   This has been discussed with Dr. Hu.
Patient was examined and discussed with Dr. Hu.    PAtient spiked a fever of 100.2, we ordered blood cultures , but patient refused, next blood cultures will be done with next labs. currently he is no having fever. vital signs stable."  c/o pain in his leg.  Alert, morbidly obese man, in bed, lying on his side  Vital Signs Last 24 Hrs  T(C): 37.4 (06 Dec 2019 17:19), Max: 37.9 (06 Dec 2019 11:44)  T(F): 99.4 (06 Dec 2019 17:19), Max: 100.2 (06 Dec 2019 11:44)  HR: 85 (06 Dec 2019 14:20) (81 - 90)  BP: 135/62 (06 Dec 2019 13:35) (110/50 - 149/70)  BP(mean): --  RR: 18 (06 Dec 2019 13:35) (17 - 18)  SpO2: 99% (06 Dec 2019 14:20) (99% - 100%)  Lungs, clear  Cor, RRR  Abdomen, soft  Ext, left leg is above right leg as he lies in right decubitus.                         8.8    12.80 )-----------( 282      ( 06 Dec 2019 05:43 )             29.7   12-06    133<L>  |  94<L>  |  65<H>  ----------------------------<  148<H>  4.8   |  31  |  7.01<H>    Ca    8.8      06 Dec 2019 05:43  Phos  6.2     12-06  Mg     2.3     12-06    IMP:  ESRD, on dialysis, electrolytes are improved, though patient has been non-adherent with renal diet          COPD, stable          Mood disorder, stable          Weakness and decreased sensation in the left lower extremity, improved, according to patient history          low grade fever.  patient on hemodialysis, r/o bacteremia.  Patient has declined blood cultures today.   Plan: Continue dialysis.  Blood cultures, if patient allows.  If fever resolves, will transfer to [facility], when accepted.
Patient was examined and discussed with Dr. Hu.       He is alert, s/p dialysis.    Vital Signs Last 24 Hrs  T(C): 36.8 (09 Dec 2019 10:23), Max: 37.3 (08 Dec 2019 17:09)  T(F): 98.3 (09 Dec 2019 10:23), Max: 99.1 (08 Dec 2019 17:09)  HR: 77 (09 Dec 2019 10:23) (77 - 87)  BP: 104/44 (09 Dec 2019 10:23) (104/44 - 137/64)  BP(mean): --  RR: 18 (09 Dec 2019 10:23) (18 - 18)  SpO2: 98% (09 Dec 2019 05:01) (97% - 100%)                        8.9    9.16  )-----------( 307      ( 09 Dec 2019 08:31 )             30.8   12-09    131<L>  |  96  |  80<H>  ----------------------------<  105<H>  4.8   |  27  |  9.33<H>    Ca    8.8      09 Dec 2019 08:31  Phos  7.8     12-09  Mg     2.5     12-09    Specimen Source: .Blood (12.07.19 @ 18:12)  Culture - Blood in AM (12.07.19 @ 18:12)    Specimen Source: .Blood    Culture Results:   No growth to date.    IMP:  ESRD, COPD, mood disorder, stable.          Fever, resolved. No evidence of sepsis          Leg pain, stable.  Patient declined w/u; will have as outpatient.   Plan: Discharge to [facility], when arrangements can be made.
Patient was discussed with Dr. Hu and examined in the dialysis unit.     He is feeling better.   Afebrile > 48 hours, after vancomycin was given.   Vital Signs Last 24 Hrs  T(C): 37.3 (13 Dec 2019 14:00), Max: 37.3 (13 Dec 2019 14:00)  T(F): 99.1 (13 Dec 2019 14:00), Max: 99.1 (13 Dec 2019 14:00)  HR: 79 (13 Dec 2019 14:00) (79 - 82)  BP: 123/48 (13 Dec 2019 14:00) (114/49 - 135/64)  BP(mean): --  RR: 18 (13 Dec 2019 14:00) (15 - 18)  SpO2: 100% (13 Dec 2019 14:00) (96% - 100%)  Lungs, clear  Cor, RRR  Abdomen, soft  Ext, less edema, no erythema or calor in the left thigh  Neurological, non-focal  Urinalysis Basic - ( 12 Dec 2019 01:21 )    Color: Yellow / Appearance: Clear / S.010 / pH: x  Gluc: x / Ketone: Negative  / Bili: Negative / Urobili: Negative   Blood: x / Protein: 100 / Nitrite: Negative   Leuk Esterase: Moderate / RBC: 2-5 /HPF / WBC 11-25 /HPF   Sq Epi: x / Non Sq Epi: Many /HPF / Bacteria: x    Culture Results:   No growth to date. (19 @ 02:20)  urine culture is pending    IMP:  ESRD, on dialysis          DM, moderate control          Morbid obesity          Mood disorder, stable          fever, likely secondary to cellulitis complicating leg edema, possibly induced by self-inflicted trauma          positive urinalysis, culture, pending.  Not likely the source of fever in this patient  Plan:  Discharge to [facility] after dialysis           Continue dialysis           Vancomycin 500mg today after dialysis, and after the next two dialysis treatments.   Discussed with patient, Dr. Hu, and senior resident.
40 yo M with extensive PMH of ESRD on HD, substance abuse, DM, 30 pack year tobacco use, obesity, htn presenting with acute respiratory distress due to pulmonary edema. Pt had missed his HD session on 12/2/19. Currently, pt's respiratory status has improved. Cw HD as per nephro. Pt had a temp 100.2 on 12/6 with blood cultures done on 12/7 as pt had refused at the time. Monitor for 48 hours, if no growth, dc planning to [facility] on monday. Also notes left knee pain with extensive edema to the bl lower extremities. Pt unable to have MRI due to claustrophobia. Xray knee and CT scan reviewed, no acute findings. Pain to left knee likely from bl lower ext edema. Pt encouraged to ambulate and adhere to renal diet.
Patient was examined and discussed with Dr. Hu.   He says that he is unable to move his leg and has decreased sensation.      Neurology has evaluated him and recommended  MRI.  He became claustrophobic and did not complete MRI, despite being sedated with ativan.     Alert man, in NAD  Vital Signs Last 24 Hrs  T(C): 37.4 (05 Dec 2019 14:44), Max: 37.7 (05 Dec 2019 04:50)  T(F): 99.3 (05 Dec 2019 14:44), Max: 99.8 (05 Dec 2019 04:50)  HR: 95 (05 Dec 2019 14:44) (86 - 95)  BP: 135/68 (05 Dec 2019 14:44) (108/45 - 147/73)  BP(mean): --  RR: 17 (05 Dec 2019 14:44) (17 - 18)  SpO2: 100% (05 Dec 2019 14:44) (96% - 100%) on nasal oxygen  Lungs, clear  Cor, RRR  Ext, chronic edema of LLE                         8.9    9.29  )-----------( 301      ( 04 Dec 2019 14:16 )             31.2   12-04    133<L>  |  93<L>  |  78<H>  ----------------------------<  168<H>  4.6   |  29  |  7.64<H>    Ca    8.6      04 Dec 2019 14:16  Phos  7.6     12-04  Mg     2.4     12-04    TPro  7.4  /  Alb  2.5<L>  /  TBili  0.5  /  DBili  x   /  AST  7<L>  /  ALT  16  /  AlkPhos  210<H>  12-04    IMP:  ESRD, on dialysis, stable          COPD, stable on nasal oxygen          mood disorder, stable          leg weakness, unclear whether there is anatomic explanation          DM, moderate control          Morbid obesity          edema LE, improving  Plan: Dialysis today.  Transfer to [facility].  Patient may have open MRI after discharge.

## 2019-12-13 NOTE — CHART NOTE - NSCHARTNOTEFT_GEN_A_CORE
Assessment:   Patient  visited in dialysis. Pt Reports  Good appetite.  Pt dislikes Hospital Food. Mother brings food from Home. Pt is also on Nepro BID, Diabetic no snacks Dialysis Diet. Pt receives Nutrient dense snacks ( Allergic to FISH) Labs Noted . Meds Noted on Danielle Montelongo. D/W RN     PO intake:   Source for PO intake [x ] Patient/family [ ] chart [ ] staff     Current Weight:   % Weight Change Before Dialysis 212.6    Pertinent Medications: MEDICATIONS  (STANDING):  albuterol/ipratropium for Nebulization 3 milliLiter(s) Nebulizer every 6 hours  benzocaine 15 mG/menthol 3.6 mG (Sugar-Free) Lozenge 1 Lozenge Oral every 4 hours  calcium acetate 667 milliGRAM(s) Oral three times a day with meals  carvedilol 6.25 milliGRAM(s) Oral every 12 hours  cefTRIAXone   IVPB 1000 milliGRAM(s) IV Intermittent every 24 hours  chlorhexidine 4% Liquid 1 Application(s) Topical daily  clopidogrel Tablet 75 milliGRAM(s) Oral daily  epoetin cyndie Injectable 00427 Unit(s) SubCutaneous <User Schedule>  heparin  Injectable 5000 Unit(s) SubCutaneous every 8 hours  hydrALAZINE 25 milliGRAM(s) Oral three times a day  insulin lispro (HumaLOG) corrective regimen sliding scale   SubCutaneous Before meals and at bedtime  isosorbide   mononitrate ER Tablet (IMDUR) 30 milliGRAM(s) Oral daily  methocarbamol 500 milliGRAM(s) Oral two times a day  mirtazapine 7.5 milliGRAM(s) Oral daily  polyethylene glycol 3350 17 Gram(s) Oral every 12 hours  risperiDONE   Tablet 1 milliGRAM(s) Oral every 12 hours  senna 2 Tablet(s) Oral at bedtime  sevelamer carbonate 1600 milliGRAM(s) Oral three times a day with meals  simvastatin 40 milliGRAM(s) Oral at bedtime  vancomycin  IVPB 500 milliGRAM(s) IV Intermittent once    MEDICATIONS  (PRN):  acetaminophen   Tablet .. 650 milliGRAM(s) Oral every 6 hours PRN Temp greater or equal to 38C (100.4F)  guaiFENesin    Syrup 100 milliGRAM(s) Oral every 6 hours PRN Cough  oxycodone    5 mG/acetaminophen 325 mG 1 Tablet(s) Oral every 6 hours PRN Severe Pain (7 - 10)    Pertinent Labs:  12-13 Na132 mmol/L<L> Glu 126 mg/dL<H> K+ 5.0 mmol/L Cr  9.25 mg/dL<H> BUN 74 mg/dL<H> 12-13 Phos 6.7 mg/dL<H>      Skin: No pressure ulcer     Estimated Needs:   [ ] no change since previous assessment  [ ] recalculated:       Previous Nutrition Diagnosis:   [ ] Inadequate Energy Intake [ ]Inadequate Oral Intake [ ] Excessive Energy Intake   [ ] Underweight [ ] Increased Nutrient Needs [ ] Overweight/Obesity   [ ] Altered GI Function [ ] Unintended Weight Loss [ ] Food & Nutrition Related Knowledge Deficit [ ] Malnutrition   (x) altered Nutrition related Labs continuos.    Nutrition Diagnosis is [x ] ongoing  [ ] resolved [ ] not applicable     New Nutrition Diagnosis: [ ] not applicable  [ ] Inadequate Energy Intake [ ]Inadequate Oral Intake [ ] Excessive Energy Intake   [ ] Underweight [ ] Increased Nutrient Needs [ ] Overweight/Obesity   [ ] Altered GI Function [ ] Unintended Weight Loss [ ] Food & Nutrition Related Knowledge Deficit [ ] Malnutrition     Related to:     As evidenced by:     Interventions:   Recommend  [ ] Change Diet To:  [ ] Nutrition Supplement  [ ] Nutrition Support  [x ] Other: Continue with current diet RX     Monitoring and Evaluation:   [ ] PO intake [ ] Tolerance to diet prescription [ ] weights [x ] follow up per protocol  [ ] other:

## 2019-12-13 NOTE — DISCHARGE NOTE NURSING/CASE MANAGEMENT/SOCIAL WORK - PATIENT PORTAL LINK FT
You can access the FollowMyHealth Patient Portal offered by [facility] by registering at the following website: [email or web address]. By joining [employer]’s FollowMyHealth portal, you will also be able to view your health information using other applications (apps) compatible with our system.

## 2019-12-13 NOTE — PROGRESS NOTE ADULT - SUBJECTIVE AND OBJECTIVE BOX
PGY 1 Note discussed with supervising resident and primary attending    Patient is a 39y old  Male who presents with a chief complaint of acute pulmonary edema (12 Dec 2019 11:43)      INTERVAL HPI/OVERNIGHT EVENTS:  PAtient was seen and examined at bedside, no new complains, he didnt spike temp since yesterday. Urine cultures testing , on rocephin for now.   MEDICATIONS  (STANDING):  albuterol/ipratropium for Nebulization 3 milliLiter(s) Nebulizer every 6 hours  benzocaine 15 mG/menthol 3.6 mG (Sugar-Free) Lozenge 1 Lozenge Oral every 4 hours  calcium acetate 667 milliGRAM(s) Oral three times a day with meals  carvedilol 6.25 milliGRAM(s) Oral every 12 hours  cefTRIAXone   IVPB 1000 milliGRAM(s) IV Intermittent every 24 hours  chlorhexidine 4% Liquid 1 Application(s) Topical daily  clopidogrel Tablet 75 milliGRAM(s) Oral daily  epoetin cyndie Injectable 34436 Unit(s) SubCutaneous <User Schedule>  heparin  Injectable 5000 Unit(s) SubCutaneous every 8 hours  hydrALAZINE 25 milliGRAM(s) Oral three times a day  insulin lispro (HumaLOG) corrective regimen sliding scale   SubCutaneous Before meals and at bedtime  isosorbide   mononitrate ER Tablet (IMDUR) 30 milliGRAM(s) Oral daily  methocarbamol 500 milliGRAM(s) Oral two times a day  mirtazapine 7.5 milliGRAM(s) Oral daily  polyethylene glycol 3350 17 Gram(s) Oral every 12 hours  risperiDONE   Tablet 1 milliGRAM(s) Oral every 12 hours  senna 2 Tablet(s) Oral at bedtime  sevelamer carbonate 1600 milliGRAM(s) Oral three times a day with meals  simvastatin 40 milliGRAM(s) Oral at bedtime    MEDICATIONS  (PRN):  acetaminophen   Tablet .. 650 milliGRAM(s) Oral every 6 hours PRN Temp greater or equal to 38C (100.4F)  guaiFENesin    Syrup 100 milliGRAM(s) Oral every 6 hours PRN Cough  oxycodone    5 mG/acetaminophen 325 mG 1 Tablet(s) Oral every 6 hours PRN Severe Pain (7 - 10)      __________________________________________________  REVIEW OF SYSTEMS:    CONSTITUTIONAL: No fever,   EYES: no acute visual disturbances  NECK: No pain or stiffness  RESPIRATORY: No cough; No shortness of breath  CARDIOVASCULAR: No chest pain, no palpitations  GASTROINTESTINAL: No pain. No nausea or vomiting; No diarrhea   NEUROLOGICAL: No headache or numbness, no tremors  MUSCULOSKELETAL: No joint pain, no muscle pain  GENITOURINARY: no dysuria, no frequency, no hesitancy  PSYCHIATRY: no depression , no anxiety  ALL OTHER  ROS negative        Vital Signs Last 24 Hrs  T(C): 36.9 (13 Dec 2019 05:20), Max: 37.1 (12 Dec 2019 20:31)  T(F): 98.5 (13 Dec 2019 05:20), Max: 98.7 (12 Dec 2019 20:31)  HR: 80 (13 Dec 2019 05:20) (80 - 83)  BP: 114/49 (13 Dec 2019 05:20) (114/49 - 135/64)  BP(mean): --  RR: 18 (13 Dec 2019 05:20) (17 - 18)  SpO2: 96% (13 Dec 2019 05:20) (96% - 100%)    ________________________________________________  PHYSICAL EXAM:  GENERAL: Obese Male  HEENT: Normocephalic;  conjunctivae and sclerae clear; moist mucous membranes;   NECK : supple  CHEST/LUNG: Clear to auscultation bilaterally with good air entry   HEART: S1 S2  regular; no murmurs, gallops or rubs  ABDOMEN: Soft, Nontender, Nondistended; Bowel sounds present  EXTREMITIES: no cyanosis; LLE edema; no calf tenderness  SKIN: warm and dry; no rash  NERVOUS SYSTEM:  Awake and alert; Oriented  to place, person and time ; no new deficits    _________________________________________________  LABS:                        8.1    10.80 )-----------( 337      ( 12 Dec 2019 07:46 )             28.0         130<L>  |  95<L>  |  61<H>  ----------------------------<  122<H>  4.7   |  28  |  7.36<H>    Ca    9.2      12 Dec 2019 07:46  Phos  6.2       Mg     2.6             Urinalysis Basic - ( 12 Dec 2019 01:21 )    Color: Yellow / Appearance: Clear / S.010 / pH: x  Gluc: x / Ketone: Negative  / Bili: Negative / Urobili: Negative   Blood: x / Protein: 100 / Nitrite: Negative   Leuk Esterase: Moderate / RBC: 2-5 /HPF / WBC 11-25 /HPF   Sq Epi: x / Non Sq Epi: Many /HPF / Bacteria: x      CAPILLARY BLOOD GLUCOSE      POCT Blood Glucose.: 117 mg/dL (13 Dec 2019 11:35)  POCT Blood Glucose.: 107 mg/dL (13 Dec 2019 07:55)  POCT Blood Glucose.: 130 mg/dL (12 Dec 2019 21:32)  POCT Blood Glucose.: 136 mg/dL (12 Dec 2019 17:00)        RADIOLOGY & ADDITIONAL TESTS:    Imaging Personally Reviewed:  YES/NO    Consultant(s) Notes Reviewed:   YES/ No    Care Discussed with Consultants :     Plan of care was discussed with patient and /or primary care giver; all questions and concerns were addressed and care was aligned with patient's wishes.

## 2019-12-13 NOTE — PROGRESS NOTE ADULT - PROBLEM SELECTOR PLAN 7
-Patient is non compliant with diet and exercise.
-Patient is non compliant with diet and exercise.
Patient is on heparin for DVT prophylaxis.                                   1

## 2019-12-13 NOTE — PROGRESS NOTE ADULT - ASSESSMENT
38 y/o morbidly obese male from home PMH ESRD (on MWF HD via LUE AVF at [facility] w/ Dr La), HTN, IDDM, Substance abuse, COPD (former smoker, on home oxygen 2L), Bipolar disorder, and Schizophrenia p/w shortness of breath x 1 day s/p missed HD - admitted to the ICU for acute respiratory failure 2/2 acute pulmonary edema.

## 2019-12-14 LAB

## 2019-12-17 LAB
CULTURE RESULTS: SIGNIFICANT CHANGE UP
SPECIMEN SOURCE: SIGNIFICANT CHANGE UP

## 2019-12-21 NOTE — PROGRESS NOTE ADULT - I WAS PHYSICALLY PRESENT FOR THE KEY PORTIONS OF THE EVALUATION AND MANAGEMENT (E/M) SERVICE PROVIDED.  I AGREE WITH THE ABOVE HISTORY, PHYSICAL, AND PLAN WHICH I HAVE REVIEWED AND EDITED WHERE APPROPRIATE
No wheezing, rales, or rhonchi on exam today  Her left side pain does not appear to be due to a pulmonary issue at this time  Continue Singluair and PRN albuterol as prescribed 
Patient appears to have a muscle spasm of a left abdominal muscle  Unable to prescribe NSAID due to aspirin allergy  Will treat with a course of oral steroids and a muscle relaxer  Discussed that the muscle relaxer will cause drowsiness, and to use only as needed  Encouraged gentle stretching exercises  Also recommend applying heat 20 minutes three times a day  If symptoms change or worsen, recommend follow-up to reassess 
Patient has had mouth sores and nausea due to her chemotherapy  Thus, she has not been hydrating well as of late  Encouraged patient to increase her fluids as tolerated  Continue magic mouthwash and ondansetron as prescribed by oncology 
Statement Selected

## 2019-12-22 ENCOUNTER — INPATIENT (INPATIENT)
Facility: HOSPITAL | Age: 39
LOS: 14 days | Discharge: NURSING, REHAB OR LONG-TERM CARE | DRG: 291 | End: 2020-01-06
Attending: INTERNAL MEDICINE | Admitting: HOSPITALIST
Payer: MEDICARE

## 2019-12-22 VITALS
RESPIRATION RATE: 17 BRPM | TEMPERATURE: 99 F | HEART RATE: 90 BPM | DIASTOLIC BLOOD PRESSURE: 68 MMHG | OXYGEN SATURATION: 99 % | SYSTOLIC BLOOD PRESSURE: 119 MMHG

## 2019-12-22 DIAGNOSIS — Z98.890 OTHER SPECIFIED POSTPROCEDURAL STATES: Chronic | ICD-10-CM

## 2019-12-22 LAB
ALBUMIN SERPL ELPH-MCNC: 3.5 G/DL — SIGNIFICANT CHANGE UP (ref 3.3–5)
ALP SERPL-CCNC: 226 U/L — HIGH (ref 40–120)
ALT FLD-CCNC: 7 U/L — LOW (ref 10–45)
ANION GAP SERPL CALC-SCNC: 19 MMOL/L — HIGH (ref 5–17)
APTT BLD: 33.7 SEC — SIGNIFICANT CHANGE UP (ref 27.5–36.3)
AST SERPL-CCNC: 9 U/L — LOW (ref 10–40)
BASOPHILS # BLD AUTO: 0.04 K/UL — SIGNIFICANT CHANGE UP (ref 0–0.2)
BASOPHILS NFR BLD AUTO: 0.4 % — SIGNIFICANT CHANGE UP (ref 0–2)
BILIRUB SERPL-MCNC: 0.4 MG/DL — SIGNIFICANT CHANGE UP (ref 0.2–1.2)
BUN SERPL-MCNC: 62 MG/DL — HIGH (ref 7–23)
CALCIUM SERPL-MCNC: 9.5 MG/DL — SIGNIFICANT CHANGE UP (ref 8.4–10.5)
CHLORIDE SERPL-SCNC: 85 MMOL/L — LOW (ref 96–108)
CO2 SERPL-SCNC: 30 MMOL/L — SIGNIFICANT CHANGE UP (ref 22–31)
CREAT SERPL-MCNC: 10.28 MG/DL — HIGH (ref 0.5–1.3)
EOSINOPHIL # BLD AUTO: 0.64 K/UL — HIGH (ref 0–0.5)
EOSINOPHIL NFR BLD AUTO: 7.1 % — HIGH (ref 0–6)
GLUCOSE SERPL-MCNC: 142 MG/DL — HIGH (ref 70–99)
HCT VFR BLD CALC: 26.6 % — LOW (ref 39–50)
HGB BLD-MCNC: 7.8 G/DL — LOW (ref 13–17)
IMM GRANULOCYTES NFR BLD AUTO: 0.2 % — SIGNIFICANT CHANGE UP (ref 0–1.5)
INR BLD: 1.12 RATIO — SIGNIFICANT CHANGE UP (ref 0.88–1.16)
LYMPHOCYTES # BLD AUTO: 0.72 K/UL — LOW (ref 1–3.3)
LYMPHOCYTES # BLD AUTO: 8 % — LOW (ref 13–44)
MAGNESIUM SERPL-MCNC: 2.5 MG/DL — SIGNIFICANT CHANGE UP (ref 1.6–2.6)
MCHC RBC-ENTMCNC: 28.1 PG — SIGNIFICANT CHANGE UP (ref 27–34)
MCHC RBC-ENTMCNC: 29.3 GM/DL — LOW (ref 32–36)
MCV RBC AUTO: 95.7 FL — SIGNIFICANT CHANGE UP (ref 80–100)
MONOCYTES # BLD AUTO: 0.92 K/UL — HIGH (ref 0–0.9)
MONOCYTES NFR BLD AUTO: 10.3 % — SIGNIFICANT CHANGE UP (ref 2–14)
NEUTROPHILS # BLD AUTO: 6.62 K/UL — SIGNIFICANT CHANGE UP (ref 1.8–7.4)
NEUTROPHILS NFR BLD AUTO: 74 % — SIGNIFICANT CHANGE UP (ref 43–77)
NRBC # BLD: 0 /100 WBCS — SIGNIFICANT CHANGE UP (ref 0–0)
PHOSPHATE SERPL-MCNC: 6.4 MG/DL — HIGH (ref 2.5–4.5)
PLATELET # BLD AUTO: 412 K/UL — HIGH (ref 150–400)
POTASSIUM SERPL-MCNC: 4.9 MMOL/L — SIGNIFICANT CHANGE UP (ref 3.5–5.3)
POTASSIUM SERPL-SCNC: 4.9 MMOL/L — SIGNIFICANT CHANGE UP (ref 3.5–5.3)
PROT SERPL-MCNC: 7.2 G/DL — SIGNIFICANT CHANGE UP (ref 6–8.3)
PROTHROM AB SERPL-ACNC: 12.9 SEC — SIGNIFICANT CHANGE UP (ref 10–12.9)
RBC # BLD: 2.78 M/UL — LOW (ref 4.2–5.8)
RBC # FLD: 17.8 % — HIGH (ref 10.3–14.5)
SODIUM SERPL-SCNC: 134 MMOL/L — LOW (ref 135–145)
WBC # BLD: 8.96 K/UL — SIGNIFICANT CHANGE UP (ref 3.8–10.5)
WBC # FLD AUTO: 8.96 K/UL — SIGNIFICANT CHANGE UP (ref 3.8–10.5)

## 2019-12-22 PROCEDURE — 99285 EMERGENCY DEPT VISIT HI MDM: CPT | Mod: GC

## 2019-12-22 PROCEDURE — 71045 X-RAY EXAM CHEST 1 VIEW: CPT | Mod: 26

## 2019-12-22 PROCEDURE — 73620 X-RAY EXAM OF FOOT: CPT | Mod: 26,RT

## 2019-12-22 RX ORDER — SODIUM CHLORIDE 9 MG/ML
250 INJECTION INTRAMUSCULAR; INTRAVENOUS; SUBCUTANEOUS ONCE
Refills: 0 | Status: COMPLETED | OUTPATIENT
Start: 2019-12-22 | End: 2019-12-22

## 2019-12-22 RX ADMIN — SODIUM CHLORIDE 250 MILLILITER(S): 9 INJECTION INTRAMUSCULAR; INTRAVENOUS; SUBCUTANEOUS at 19:38

## 2019-12-22 NOTE — ED ADULT NURSE NOTE - NSIMPLEMENTINTERV_GEN_ALL_ED
Implemented All Fall with Harm Risk Interventions:  [place] to call system. Call bell, personal items and telephone within reach. Instruct patient to call for assistance. Room bathroom lighting operational. Non-slip footwear when patient is off stretcher. Physically safe environment: no spills, clutter or unnecessary equipment. Stretcher in lowest position, wheels locked, appropriate side rails in place. Provide visual cue, wrist band, yellow gown, etc. Monitor gait and stability. Monitor for mental status changes and reorient to person, place, and time. Review medications for side effects contributing to fall risk. Reinforce activity limits and safety measures with patient and family. Provide visual clues: red socks.

## 2019-12-22 NOTE — ED PROVIDER NOTE - CLINICAL SUMMARY MEDICAL DECISION MAKING FREE TEXT BOX
40 yo M PMHx ESRD on dialysis MWF via L AV fistula, DM2, HTN, bipolar disorder p/w concern for AV fistula occlusion, fistula site with palpable thrill, no concern for occlusion, site is warm and well perfused, no overlying erythema, will check basic labs and DC home

## 2019-12-22 NOTE — ED ADULT NURSE REASSESSMENT NOTE - NS ED NURSE REASSESS COMMENT FT1
Pt requires ambulette for dispo, as pt came from nursing home. Pt and family aware of plan of care. MD Whelan made aware transfer ambulette must be arrange

## 2019-12-22 NOTE — ED PROVIDER NOTE - ATTENDING CONTRIBUTION TO CARE
39 YOM PMHx ESRD on dialysis MWF via L AV fistula on , DM2, HTN, bipolar disorder here concerned about AV fistula occlusion. Reports felt that it was not as "loud" as it usually is. Got dialysis 2 days ago at without problems. Has not missed any sessions. Denies fever, chills, cp, sob, abd pain, leg swelling. Patient also with chronic twitching of his extremities  AP - patient with palpable thrill over LUE fistula. no signs of fluid overload. will eval for electrolytes given twitching. reassess

## 2019-12-22 NOTE — ED PROVIDER NOTE - OBJECTIVE STATEMENT
40 yo M PMHx ESRD on dialysis MWF via L AV fistula, DM2, HTN, bipolar disorder p/w concern for AV fistula occlusion. Pt states he normally feels a bounding thrill but it has felt diminished. He received dialysis on Friday and has not missed any session recently.

## 2019-12-22 NOTE — ED PROVIDER NOTE - PATIENT PORTAL LINK FT
You can access the FollowMyHealth Patient Portal offered by [facility] by registering at the following website: [email or web address]. By joining [employer]’s FollowMyHealth portal, you will also be able to view your health information using other applications (apps) compatible with our system.

## 2019-12-22 NOTE — ED PROVIDER NOTE - NSFOLLOWUPINSTRUCTIONS_ED_ALL_ED_FT
You were seen in the Emergency Department for check of your AV fistula.   1) Advance activity as tolerated.    2) Continue all previously prescribed medications as directed.    3) Follow up with your primary care physician in 24-48 hours - take copies of your results.    4) Return to the Emergency Department for worsening or persistent symptoms, and/or ANY NEW OR CONCERNING SYMPTOMS. If you have issues obtaining follow up, please call: [phone] to obtain a doctor or specialist who takes your insurance in your area.

## 2019-12-22 NOTE — ED ADULT NURSE REASSESSMENT NOTE - NS ED NURSE REASSESS COMMENT FT1
Pt reports being dizzy and feeling like the room is spinning, MD Tubbs aware & at bedside for evaluation. Pt presents with eyes closed at this time. AOx3, speaking in complete sentences, unlabored, spontaneous respirations, NAD. VSS, to administered IVF as requested by MD.

## 2019-12-22 NOTE — ED PROVIDER NOTE - FAMILY HISTORY
Family history of diabetes mellitus     Father  Still living? Unknown  Family history of COPD (chronic obstructive pulmonary disease), Age at diagnosis: Age Unknown

## 2019-12-22 NOTE — ED ADULT NURSE NOTE - OBJECTIVE STATEMENT
39yr old male w/ pmhx of Asthma, COPD, schizophrenia, bipolar disorder, and kidney failure (on dialysis) BIBEMS c/o AV fistula blockage. Pt states as of this am he noticed his 39yr old male w/ pmhx of Asthma, COPD, schizophrenia, bipolar disorder, and kidney failure (on dialysis) BIBEMS c/o AV fistula blockage. Pt states he had dialysis on friday and as of this am he noticed his AV fistula was blocked Pt states that the site is sore, but no puss or discharge was  Pt also states, for the past week he has been having constant tremors. Pt also states he has been in a rehab facility b/c he has been immobile and on bedrest d/t being intubated from a severe asthma attack and developed a blister on the bottom of his foot that popped and now it is painful. No redness, inflammation, discharge or signs of infection present upon assessment of site. There is dried serosanguinous fluid on the bottom of foot where blister was, and B/L tightness and pitting edma noted on LE. Pt denies any fevers, chills, n,v,d, cp, blurry vision, headache, numbness and tingling, 39yr old male w/ pmhx of Asthma, COPD, schizophrenia, bipolar disorder, and kidney failure (on dialysis) BIBEMS c/o AV fistula blockage. Pt states he had dialysis on friday and as of this am he noticed his AV fistula was blocked Pt states that the site is sore, but no puss swelling or discharge was present  Pt also states, for the past week he has been having constant tremors. Pt also states he has been in a rehab facility b/c he has been immobile and on bedrest d/t being intubated from a severe asthma attack and developed a blister on the bottom of his foot that popped and now it is painful. Redness noted around Av site, no inflammation, discharge or signs of infection present, and thrill was felt upon palpation. Pt states the thrill is usually a lot stronger, and that is why he thinks its blocked.  There is dried serosanguinous fluid on the bottom of foot where blister was, and B/L tightness and pitting edma noted on LE. Pt denies any fevers, chills, n,v,d, cp, blurry vision, headache, numbness and tingling,

## 2019-12-22 NOTE — ED PROVIDER NOTE - CARE PLAN
Principal Discharge DX:	AV fistula Principal Discharge DX:	AV fistula  Secondary Diagnosis:	Near syncope

## 2019-12-22 NOTE — ED PROVIDER NOTE - PHYSICAL EXAMINATION
Gen: AAOx3, non-toxic, obese  Head: NCAT  HEENT: EOMI, oral mucosa moist, normal conjunctiva  Lung: CTAB, no respiratory distress, no wheezes/rhonchi/rales B/L, speaking in full sentences  CV: RRR, no murmurs, rubs or gallops, L AV fistula with palpable thrill  Abd: soft, NTND, no guarding  MSK: no visible deformities  Neuro: No focal sensory or motor deficits  Skin: Warm, well perfused, no rash  Psych: normal affect.   ~Alix Tubbs M.D. Resident

## 2019-12-22 NOTE — ED ADULT NURSE REASSESSMENT NOTE - NS ED NURSE REASSESS COMMENT FT1
Pt AAOx4, NAD, resp nonlabored, resting comfortably in bed with family at bedside. Pt c/o fatigue, and worsening tightness in his legs. Md made aware. Pt denies headache, dizziness, chest pain, palpitations, SOB, abd pain, n/v/d, urinary symptoms, fevers, chills, weakness at this time. Pt awaiting discharge. Safety maintained.

## 2019-12-22 NOTE — ED ADULT NURSE REASSESSMENT NOTE - NS ED NURSE REASSESS COMMENT FT1
RN was notified pt felt dizzy, RN went to assess pt and he was unresponsive and slumped in stretcher. pt awoke when RN repositioned pt In stretcher. FS completed. VSS. MD dueñas at the bedside assessing pt at this time,

## 2019-12-22 NOTE — ED PROVIDER NOTE - NS ED ROS FT
GENERAL: No fever or chills, EYES: no change in vision, HEENT: no trouble swallowing or speaking, CARDIAC: no chest pain, PULMONARY: no cough or SOB, GI: no abdominal pain, no nausea, no vomiting, no diarrhea or constipation, : No changes in urination, SKIN: no rashes, NEURO: no headache,  MSK: No joint pain ~Alix Tubbs M.D. Resident

## 2019-12-22 NOTE — ED PROVIDER NOTE - PROGRESS NOTE DETAILS
patient awaiting ambulating. called over by RN who found patient slumped over in bed requiring sternal rub.  at time of evaluation. vitals stable. will get EKG/trop. may need admission for near syncope workup

## 2019-12-22 NOTE — ED ADULT NURSE REASSESSMENT NOTE - NS ED NURSE REASSESS COMMENT FT1
Report received from Brian SANCHEZ. Aox3, speaking in complete sentences. Unlabored, spontaneous respirations, NAD. CM in place NSR HR 90s. Sating 100% on 4L NC. Awaiting admission to hospital at this time.

## 2019-12-23 DIAGNOSIS — I10 ESSENTIAL (PRIMARY) HYPERTENSION: ICD-10-CM

## 2019-12-23 DIAGNOSIS — N18.6 END STAGE RENAL DISEASE: ICD-10-CM

## 2019-12-23 DIAGNOSIS — R55 SYNCOPE AND COLLAPSE: ICD-10-CM

## 2019-12-23 DIAGNOSIS — I77.0 ARTERIOVENOUS FISTULA, ACQUIRED: ICD-10-CM

## 2019-12-23 LAB
ALBUMIN SERPL ELPH-MCNC: 3.3 G/DL — SIGNIFICANT CHANGE UP (ref 3.3–5)
ALP SERPL-CCNC: 202 U/L — HIGH (ref 40–120)
ALT FLD-CCNC: 8 U/L — LOW (ref 10–45)
ANION GAP SERPL CALC-SCNC: 17 MMOL/L — SIGNIFICANT CHANGE UP (ref 5–17)
AST SERPL-CCNC: 12 U/L — SIGNIFICANT CHANGE UP (ref 10–40)
BASE EXCESS BLDA CALC-SCNC: 4.1 MMOL/L — HIGH (ref -2–2)
BILIRUB SERPL-MCNC: 0.5 MG/DL — SIGNIFICANT CHANGE UP (ref 0.2–1.2)
BUN SERPL-MCNC: 67 MG/DL — HIGH (ref 7–23)
CALCIUM SERPL-MCNC: 9.4 MG/DL — SIGNIFICANT CHANGE UP (ref 8.4–10.5)
CHLORIDE SERPL-SCNC: 88 MMOL/L — LOW (ref 96–108)
CO2 BLDA-SCNC: 33 MMOL/L — HIGH (ref 22–30)
CO2 SERPL-SCNC: 29 MMOL/L — SIGNIFICANT CHANGE UP (ref 22–31)
CREAT SERPL-MCNC: 10.97 MG/DL — HIGH (ref 0.5–1.3)
GAS PNL BLDA: SIGNIFICANT CHANGE UP
GLUCOSE BLDC GLUCOMTR-MCNC: 89 MG/DL — SIGNIFICANT CHANGE UP (ref 70–99)
GLUCOSE SERPL-MCNC: 94 MG/DL — SIGNIFICANT CHANGE UP (ref 70–99)
HCO3 BLDA-SCNC: 31 MMOL/L — HIGH (ref 21–29)
HCT VFR BLD CALC: 26.7 % — LOW (ref 39–50)
HGB BLD-MCNC: 7.5 G/DL — LOW (ref 13–17)
MCHC RBC-ENTMCNC: 27.6 PG — SIGNIFICANT CHANGE UP (ref 27–34)
MCHC RBC-ENTMCNC: 28.1 GM/DL — LOW (ref 32–36)
MCV RBC AUTO: 98.2 FL — SIGNIFICANT CHANGE UP (ref 80–100)
NRBC # BLD: 0 /100 WBCS — SIGNIFICANT CHANGE UP (ref 0–0)
PCO2 BLDA: 68 MMHG — HIGH (ref 32–46)
PH BLDA: 7.28 — LOW (ref 7.35–7.45)
PLATELET # BLD AUTO: 392 K/UL — SIGNIFICANT CHANGE UP (ref 150–400)
PO2 BLDA: 95 MMHG — SIGNIFICANT CHANGE UP (ref 74–108)
POTASSIUM SERPL-MCNC: 5.9 MMOL/L — HIGH (ref 3.5–5.3)
POTASSIUM SERPL-SCNC: 5.9 MMOL/L — HIGH (ref 3.5–5.3)
PROT SERPL-MCNC: 6.9 G/DL — SIGNIFICANT CHANGE UP (ref 6–8.3)
RAPID RVP RESULT: SIGNIFICANT CHANGE UP
RBC # BLD: 2.72 M/UL — LOW (ref 4.2–5.8)
RBC # FLD: 17.7 % — HIGH (ref 10.3–14.5)
SAO2 % BLDA: 97 % — HIGH (ref 92–96)
SODIUM SERPL-SCNC: 134 MMOL/L — LOW (ref 135–145)
TROPONIN T, HIGH SENSITIVITY RESULT: 83 NG/L — HIGH (ref 0–51)
TROPONIN T, HIGH SENSITIVITY RESULT: 88 NG/L — HIGH (ref 0–51)
WBC # BLD: 8.63 K/UL — SIGNIFICANT CHANGE UP (ref 3.8–10.5)
WBC # FLD AUTO: 8.63 K/UL — SIGNIFICANT CHANGE UP (ref 3.8–10.5)

## 2019-12-23 PROCEDURE — 70450 CT HEAD/BRAIN W/O DYE: CPT | Mod: 26

## 2019-12-23 RX ORDER — ALBUTEROL 90 UG/1
2 AEROSOL, METERED ORAL EVERY 6 HOURS
Refills: 0 | Status: DISCONTINUED | OUTPATIENT
Start: 2019-12-23 | End: 2020-01-06

## 2019-12-23 RX ORDER — RISPERIDONE 4 MG/1
1 TABLET ORAL EVERY 12 HOURS
Refills: 0 | Status: DISCONTINUED | OUTPATIENT
Start: 2019-12-23 | End: 2020-01-06

## 2019-12-23 RX ORDER — MIRTAZAPINE 45 MG/1
7.5 TABLET, ORALLY DISINTEGRATING ORAL AT BEDTIME
Refills: 0 | Status: DISCONTINUED | OUTPATIENT
Start: 2019-12-23 | End: 2020-01-06

## 2019-12-23 RX ORDER — CLONAZEPAM 1 MG
0.5 TABLET ORAL EVERY 12 HOURS
Refills: 0 | Status: DISCONTINUED | OUTPATIENT
Start: 2019-12-23 | End: 2019-12-30

## 2019-12-23 RX ORDER — ASCORBIC ACID 60 MG
500 TABLET,CHEWABLE ORAL DAILY
Refills: 0 | Status: DISCONTINUED | OUTPATIENT
Start: 2019-12-23 | End: 2020-01-06

## 2019-12-23 RX ORDER — ERYTHROPOIETIN 10000 [IU]/ML
20000 INJECTION, SOLUTION INTRAVENOUS; SUBCUTANEOUS
Refills: 0 | Status: DISCONTINUED | OUTPATIENT
Start: 2019-12-23 | End: 2019-12-28

## 2019-12-23 RX ORDER — PANTOPRAZOLE SODIUM 20 MG/1
40 TABLET, DELAYED RELEASE ORAL
Refills: 0 | Status: DISCONTINUED | OUTPATIENT
Start: 2019-12-23 | End: 2020-01-06

## 2019-12-23 RX ORDER — FUROSEMIDE 40 MG
1 TABLET ORAL
Qty: 0 | Refills: 0 | DISCHARGE

## 2019-12-23 RX ORDER — HEPARIN SODIUM 5000 [USP'U]/ML
5000 INJECTION INTRAVENOUS; SUBCUTANEOUS EVERY 8 HOURS
Refills: 0 | Status: DISCONTINUED | OUTPATIENT
Start: 2019-12-23 | End: 2020-01-06

## 2019-12-23 RX ORDER — CARVEDILOL PHOSPHATE 80 MG/1
6.25 CAPSULE, EXTENDED RELEASE ORAL EVERY 12 HOURS
Refills: 0 | Status: DISCONTINUED | OUTPATIENT
Start: 2019-12-23 | End: 2020-01-06

## 2019-12-23 RX ORDER — HYDRALAZINE HCL 50 MG
50 TABLET ORAL THREE TIMES A DAY
Refills: 0 | Status: DISCONTINUED | OUTPATIENT
Start: 2019-12-23 | End: 2019-12-28

## 2019-12-23 RX ORDER — FUROSEMIDE 40 MG
80 TABLET ORAL DAILY
Refills: 0 | Status: DISCONTINUED | OUTPATIENT
Start: 2019-12-23 | End: 2020-01-06

## 2019-12-23 RX ORDER — DEXTROSE 50 % IN WATER 50 %
25 SYRINGE (ML) INTRAVENOUS ONCE
Refills: 0 | Status: DISCONTINUED | OUTPATIENT
Start: 2019-12-23 | End: 2020-01-06

## 2019-12-23 RX ORDER — SEVELAMER CARBONATE 2400 MG/1
2400 POWDER, FOR SUSPENSION ORAL
Refills: 0 | Status: DISCONTINUED | OUTPATIENT
Start: 2019-12-23 | End: 2020-01-06

## 2019-12-23 RX ORDER — GLUCAGON INJECTION, SOLUTION 0.5 MG/.1ML
1 INJECTION, SOLUTION SUBCUTANEOUS ONCE
Refills: 0 | Status: DISCONTINUED | OUTPATIENT
Start: 2019-12-23 | End: 2020-01-06

## 2019-12-23 RX ORDER — SODIUM CHLORIDE 9 MG/ML
1000 INJECTION, SOLUTION INTRAVENOUS
Refills: 0 | Status: DISCONTINUED | OUTPATIENT
Start: 2019-12-23 | End: 2020-01-06

## 2019-12-23 RX ORDER — FERROUS SULFATE 325(65) MG
325 TABLET ORAL DAILY
Refills: 0 | Status: DISCONTINUED | OUTPATIENT
Start: 2019-12-23 | End: 2020-01-05

## 2019-12-23 RX ORDER — DEXTROSE 50 % IN WATER 50 %
15 SYRINGE (ML) INTRAVENOUS ONCE
Refills: 0 | Status: DISCONTINUED | OUTPATIENT
Start: 2019-12-23 | End: 2020-01-06

## 2019-12-23 RX ORDER — CLOPIDOGREL BISULFATE 75 MG/1
75 TABLET, FILM COATED ORAL DAILY
Refills: 0 | Status: DISCONTINUED | OUTPATIENT
Start: 2019-12-23 | End: 2020-01-06

## 2019-12-23 RX ORDER — SIMVASTATIN 20 MG/1
1 TABLET, FILM COATED ORAL
Qty: 0 | Refills: 0 | DISCHARGE

## 2019-12-23 RX ORDER — SIMVASTATIN 20 MG/1
40 TABLET, FILM COATED ORAL AT BEDTIME
Refills: 0 | Status: DISCONTINUED | OUTPATIENT
Start: 2019-12-23 | End: 2020-01-06

## 2019-12-23 RX ORDER — FOLIC ACID 0.8 MG
1 TABLET ORAL DAILY
Refills: 0 | Status: DISCONTINUED | OUTPATIENT
Start: 2019-12-23 | End: 2020-01-06

## 2019-12-23 RX ORDER — ISOSORBIDE MONONITRATE 60 MG/1
30 TABLET, EXTENDED RELEASE ORAL DAILY
Refills: 0 | Status: DISCONTINUED | OUTPATIENT
Start: 2019-12-23 | End: 2020-01-06

## 2019-12-23 RX ORDER — TRAMADOL HYDROCHLORIDE 50 MG/1
25 TABLET ORAL EVERY 8 HOURS
Refills: 0 | Status: DISCONTINUED | OUTPATIENT
Start: 2019-12-23 | End: 2019-12-30

## 2019-12-23 RX ORDER — INSULIN LISPRO 100/ML
VIAL (ML) SUBCUTANEOUS
Refills: 0 | Status: DISCONTINUED | OUTPATIENT
Start: 2019-12-23 | End: 2020-01-06

## 2019-12-23 RX ORDER — DEXTROSE 50 % IN WATER 50 %
12.5 SYRINGE (ML) INTRAVENOUS ONCE
Refills: 0 | Status: DISCONTINUED | OUTPATIENT
Start: 2019-12-23 | End: 2020-01-06

## 2019-12-23 RX ORDER — CHLORHEXIDINE GLUCONATE 213 G/1000ML
1 SOLUTION TOPICAL ONCE
Refills: 0 | Status: DISCONTINUED | OUTPATIENT
Start: 2019-12-23 | End: 2020-01-06

## 2019-12-23 RX ORDER — GABAPENTIN 400 MG/1
300 CAPSULE ORAL AT BEDTIME
Refills: 0 | Status: DISCONTINUED | OUTPATIENT
Start: 2019-12-23 | End: 2020-01-06

## 2019-12-23 RX ORDER — ASCORBIC ACID 60 MG
1 TABLET,CHEWABLE ORAL
Qty: 0 | Refills: 0 | DISCHARGE

## 2019-12-23 RX ORDER — METHOCARBAMOL 500 MG/1
500 TABLET, FILM COATED ORAL EVERY 12 HOURS
Refills: 0 | Status: DISCONTINUED | OUTPATIENT
Start: 2019-12-23 | End: 2020-01-06

## 2019-12-23 RX ADMIN — CLOPIDOGREL BISULFATE 75 MILLIGRAM(S): 75 TABLET, FILM COATED ORAL at 18:42

## 2019-12-23 RX ADMIN — Medication 325 MILLIGRAM(S): at 18:42

## 2019-12-23 RX ADMIN — Medication 1 MILLIGRAM(S): at 18:42

## 2019-12-23 RX ADMIN — ERYTHROPOIETIN 20000 UNIT(S): 10000 INJECTION, SOLUTION INTRAVENOUS; SUBCUTANEOUS at 22:15

## 2019-12-23 RX ADMIN — Medication 500 MILLIGRAM(S): at 18:42

## 2019-12-23 RX ADMIN — ISOSORBIDE MONONITRATE 30 MILLIGRAM(S): 60 TABLET, EXTENDED RELEASE ORAL at 18:43

## 2019-12-23 NOTE — ED ADULT NURSE REASSESSMENT NOTE - NS ED NURSE REASSESS COMMENT FT1
1140 Pt ate part of turkey sandwich and drank apple juice. Pt has bilat hand tremors. states started 10 days ago. Assisted with eating. Pt spoke with his mom on phone to given her the update. Dialysis sched for 8pm. 1140 Pt ate part of turkey sandwich and drank apple juice. Pt has bilat hand tremors. states started 10 days ago. Assisted with eating. Pt spoke with his mom on phone to give her the update. Dialysis sched for 8pm.

## 2019-12-23 NOTE — CONSULT NOTE ADULT - PROBLEM SELECTOR RECOMMENDATION 9
Consent in chart  Scheduled for HD today with 6kg UF as tolerated over 4hrs  Chronically volume overloaded due to poor compliance, UF session tomorrow  Sevelamer 2400mg PO TID QAC for hyperphosphatemia  Epo 20k TIW for anemia  Dose meds for ESRD  Renal diet  Daily BMP

## 2019-12-23 NOTE — ED ADULT NURSE REASSESSMENT NOTE - NS ED NURSE REASSESS COMMENT FT1
0700 report received from night nurse Karime Ocasio RN. Pt TBA tele. No bed yet. sleeping. resp D&E. Awakens to voice. A&Ox4. Skin W&D. lips and nailbeds pink. IVL intact RACF without sx of infilt. AV fist Left upper arm +thrill. Diaysis due today. Fall risk precautions maintained. No c/o pain at present. WDL WDL WDL

## 2019-12-23 NOTE — CONSULT NOTE ADULT - SUBJECTIVE AND OBJECTIVE BOX
[facility] - HUSSAIN Sanchez / HUSSAIN Villalobos / ERNESTO Mehta/ HUSSAIN Mauricio/ HUSSAIN Barber/ IVONNE Hui / JADEN La / BRANDON Alexander  -------------------------------------------------------------------------------------------------------  The patient seen and examined today.  HPI:  41 y/o M pt with multiple co morbid conditions including ESRD on hemodialysis 3 x weekly, COPD on home oxygen at baseline, obesity, HTN, DM2, migraine HA, schizophrenia and bipolar disorder, came for eval of AVF due to decreased thrill, then during ED stay RN who found patient slumped over in bed requiring sternal rub.  at time of evaluation. vitals stable. admitted for near syncope work up  Patient receives HD at [facility] via [facility] MWF, missed Sunday holiday scheduled HD    PAST MEDICAL & SURGICAL HISTORY:  COPD (chronic obstructive pulmonary disease)  Migraine  HTN (hypertension)  DM (diabetes mellitus)  Bipolar disorder  Schizophrenia  ESRD on dialysis  Morbid obesity with BMI of 40.0-44.9, adult  H/O hernia repair    Allergies :- aspirin (Swelling)  fish (Unknown)  penicillins (Swelling)  shellfish (Unknown)    Home Medications Reviewed  Hospital Medications:   MEDICATIONS  (STANDING):  chlorhexidine 4% Liquid 1 Application(s) Topical once    SOCIAL HISTORY:  Denies ETOh,Smoking,   FAMILY HISTORY:  Family history of COPD (chronic obstructive pulmonary disease)  Family history of diabetes mellitus      REVIEW OF SYSTEMS:  CONSTITUTIONAL: Left arm weakness  EYES/ENT: No visual changes;  No vertigo or throat pain   NECK: No pain or stiffness  RESPIRATORY: No cough, wheezing, hemoptysis; No shortness of breath  CARDIOVASCULAR: No chest pain or palpitations.  GASTROINTESTINAL: No abdominal or epigastric pain. No nausea, vomiting, or hematemesis; No diarrhea or constipation. No melena or hematochezia.  GENITOURINARY: No dysuria, frequency, foamy urine, urinary urgency, incontinence or hematuria  NEUROLOGICAL: Left arm weakness, slurred speech  SKIN: No itching, burning, rashes, or lesions   VASCULAR: Chronic LE edema  All other review of systems is negative unless indicated above.    VITALS:  T(F): 98.2 (12-23-19 @ 08:00), Max: 99 (12-22-19 @ 20:42)  HR: 91 (12-23-19 @ 08:00)  BP: 124/65 (12-23-19 @ 08:00)  RR: 16 (12-23-19 @ 08:00)  SpO2: 97% (12-23-19 @ 08:00)  Wt(kg): --        PHYSICAL EXAM:  Constitutional: NAD, morbidly obese  HEENT: anicteric sclera, oropharynx clear, MMM  Neck: supple.   Respiratory: Bilateral equal breath sounds , no wheezes, no crackles  Cardiovascular: S1, S2, Regular, Murmur present.  Gastrointestinal: Bowel Sound present, soft, NT/ND  Extremities: 3+ edema and chronic changes  Neurological: Alert and oriented x 3, LUE weakness noted  Psychiatric: Normal mood, normal affect  : No CVA tenderness. No hernandez.   Skin: No rashes    Data:  12-22    134<L>  |  85<L>  |  62<H>  ----------------------------<  142<H>  4.9   |  30  |  10.28<H>    Ca    9.5      22 Dec 2019 18:14  Phos  6.4     12-22  Mg     2.5     12-22    TPro  7.2  /  Alb  3.5  /  TBili  0.4  /  DBili      /  AST  9<L>  /  ALT  7<L>  /  AlkPhos  226<H>  12-22    Creatinine Trend: 10.28 <--                        7.8    8.96  )-----------( 412      ( 22 Dec 2019 18:14 )             26.6     Urine Studies:

## 2019-12-23 NOTE — PROVIDER CONTACT NOTE (OTHER) - REASON
9 beats Vtach . patient refusing bipap at this time states he has to mentally prepare himself for the mask

## 2019-12-23 NOTE — CONSULT NOTE ADULT - SUBJECTIVE AND OBJECTIVE BOX
PULMONARY CONSULT  Manuelito Mendez MD  [phone]    Initial HPI on admission:  HPI:  39 y/o M pt with multiple co morbid conditions including ESRD on hemodialysis 3 x weekly, COPD on home oxygen at baseline, obesity, HTN, DM2, migraine HA, schizophrenia and bipolar disorder, came for eval of AVF due to decreased thrill, then during ED stay RN who found patient slumped over in bed requiring sternal rub.  at time of evaluation. vitals stable. admitted for near syncope work up  Patient [place] [facility] [place] [facility] [place], missed Sunday holiday scheduled HD     BRIEF HOSPITAL COURSE: ***    PAST MEDICAL & SURGICAL HISTORY:  COPD (chronic obstructive pulmonary disease)  Migraine  HTN (hypertension)  DM (diabetes mellitus)  Bipolar disorder  Schizophrenia  ESRD on dialysis  Morbid obesity with BMI of 40.0-44.9, adult  H/O hernia repair    Allergies    aspirin (Swelling)  fish (Unknown)  penicillins (Swelling)  shellfish (Unknown)    Intolerances      FAMILY HISTORY:  Family history of COPD (chronic obstructive pulmonary disease)  Family history of diabetes mellitus    Social history:       Medications:  MEDICATIONS  (STANDING):  ALBUTerol    90 MICROgram(s) HFA Inhaler 2 Puff(s) Inhalation every 6 hours  ascorbic acid 500 milliGRAM(s) Oral daily  carvedilol 6.25 milliGRAM(s) Oral every 12 hours  chlorhexidine 4% Liquid 1 Application(s) Topical once  clonazePAM  Tablet 0.5 milliGRAM(s) Oral every 12 hours  clopidogrel Tablet 75 milliGRAM(s) Oral daily  dextrose 5%. 1000 milliLiter(s) (50 mL/Hr) IV Continuous <Continuous>  dextrose 50% Injectable 12.5 Gram(s) IV Push once  dextrose 50% Injectable 25 Gram(s) IV Push once  dextrose 50% Injectable 25 Gram(s) IV Push once  epoetin cyndie Injectable 85383 Unit(s) IV Push <User Schedule>  ferrous    sulfate 325 milliGRAM(s) Oral daily  folic acid 1 milliGRAM(s) Oral daily  furosemide    Tablet 80 milliGRAM(s) Oral daily  gabapentin 300 milliGRAM(s) Oral at bedtime  heparin  Injectable 5000 Unit(s) SubCutaneous every 8 hours  hydrALAZINE 50 milliGRAM(s) Oral three times a day  insulin lispro (HumaLOG) corrective regimen sliding scale   SubCutaneous three times a day before meals  isosorbide   mononitrate ER Tablet (IMDUR) 30 milliGRAM(s) Oral daily  mirtazapine 7.5 milliGRAM(s) Oral at bedtime  pantoprazole    Tablet 40 milliGRAM(s) Oral before breakfast  PARoxetine 20 milliGRAM(s) Oral daily  risperiDONE   Tablet 1 milliGRAM(s) Oral every 12 hours  sevelamer carbonate 2400 milliGRAM(s) Oral three times a day with meals  simvastatin 40 milliGRAM(s) Oral at bedtime    MEDICATIONS  (PRN):  dextrose 40% Gel 15 Gram(s) Oral once PRN Blood Glucose LESS THAN 70 milliGRAM(s)/deciliter  glucagon  Injectable 1 milliGRAM(s) IntraMuscular once PRN Glucose LESS THAN 70 milligrams/deciliter  methocarbamol 500 milliGRAM(s) Oral every 12 hours PRN spasms  traMADol 25 milliGRAM(s) Oral every 8 hours PRN Severe Pain (7 - 10)    Vital Signs Last 24 Hrs  T(C): 36.7 (23 Dec 2019 15:51), Max: 37.2 (22 Dec 2019 20:42)  T(F): 98 (23 Dec 2019 15:51), Max: 99 (22 Dec 2019 20:42)  HR: 88 (23 Dec 2019 18:25) (74 - 95)  BP: 133/77 (23 Dec 2019 18:25) (103/62 - 143/72)  BP(mean): --  RR: 16 (23 Dec 2019 18:25) (15 - 20)  SpO2: 97% (23 Dec 2019 18:25) (97% - 100%)    ABG - ( 23 Dec 2019 16:52 )  pH, Arterial: 7.28  pH, Blood: x     /  pCO2: 68    /  pO2: 95    / HCO3: 31    / Base Excess: 4.1   /  SaO2: 97        LABS:                        7.5    8.63  )-----------( 392      ( 23 Dec 2019 13:21 )             26.7     12-23    134<L>  |  88<L>  |  67<H>  ----------------------------<  94  5.9<H>   |  29  |  10.97<H>    Ca    9.4      23 Dec 2019 13:21  Phos  6.4     12-22  Mg     2.5     12-22    TPro  6.9  /  Alb  3.3  /  TBili  0.5  /  DBili  x   /  AST  12  /  ALT  8<L>  /  AlkPhos  202<H>  12-23      PT/INR - ( 22 Dec 2019 18:14 )   PT: 12.9 sec;   INR: 1.12 ratio         PTT - ( 22 Dec 2019 18:14 )  PTT:33.7 sec      Physical Examination:    General: No acute distress.      HEENT: Pupils equal, reactive to light.  Symmetric.    PULM: Clear to auscultation bilaterally, no significant sputum production    CVS: Regular rate and rhythm, no murmurs, rubs, or gallops    ABD: Soft, nondistended, nontender, normoactive bowel sounds, no masses    EXT: No edema, nontender    SKIN: Warm and well perfused, no rashes noted.    NEURO: Alert, oriented, interactive, nonfocal    RADIOLOGY REVIEWED PERSONALLY  CXR:    CT chest:    TTE:      Assessment:    Plan: PULMONARY CONSULT  Manuelito Mendez MD  [phone]    Initial HPI on admission:  HPI:  39 y/o M pt with multiple co morbid conditions including ESRD on hemodialysis 3 x weekly, COPD on home oxygen at baseline, obesity, HTN, DM2, migraine HA, schizophrenia and bipolar disorder, came for eval of AVF due to decreased thrill, then during ED stay RN who found patient slumped over in bed requiring sternal rub.  at time of evaluation. vitals stable. admitted for near syncope work up  [facility] [place] [facility] [place] holiday scheduled HD     Patient seen in ER: sleeping, rousable, yet unable to give history  ABG ordered: Acute respiratory acidosis    PAST MEDICAL & SURGICAL HISTORY:  COPD (chronic obstructive pulmonary disease)  Migraine  HTN (hypertension)  DM (diabetes mellitus)  Bipolar disorder  Schizophrenia  ESRD on dialysis  Morbid obesity with BMI of 40.0-44.9, adult  H/O hernia repair    Allergies    aspirin (Swelling)  fish (Unknown)  penicillins (Swelling)  shellfish (Unknown)    Intolerances      FAMILY HISTORY:  Family history of COPD (chronic obstructive pulmonary disease)  Family history of diabetes mellitus    Social history:       Medications:  MEDICATIONS  (STANDING):  ALBUTerol    90 MICROgram(s) HFA Inhaler 2 Puff(s) Inhalation every 6 hours  ascorbic acid 500 milliGRAM(s) Oral daily  carvedilol 6.25 milliGRAM(s) Oral every 12 hours  chlorhexidine 4% Liquid 1 Application(s) Topical once  clonazePAM  Tablet 0.5 milliGRAM(s) Oral every 12 hours  clopidogrel Tablet 75 milliGRAM(s) Oral daily  dextrose 5%. 1000 milliLiter(s) (50 mL/Hr) IV Continuous <Continuous>  dextrose 50% Injectable 12.5 Gram(s) IV Push once  dextrose 50% Injectable 25 Gram(s) IV Push once  dextrose 50% Injectable 25 Gram(s) IV Push once  epoetin cyndie Injectable 12362 Unit(s) IV Push <User Schedule>  ferrous    sulfate 325 milliGRAM(s) Oral daily  folic acid 1 milliGRAM(s) Oral daily  furosemide    Tablet 80 milliGRAM(s) Oral daily  gabapentin 300 milliGRAM(s) Oral at bedtime  heparin  Injectable 5000 Unit(s) SubCutaneous every 8 hours  hydrALAZINE 50 milliGRAM(s) Oral three times a day  insulin lispro (HumaLOG) corrective regimen sliding scale   SubCutaneous three times a day before meals  isosorbide   mononitrate ER Tablet (IMDUR) 30 milliGRAM(s) Oral daily  mirtazapine 7.5 milliGRAM(s) Oral at bedtime  pantoprazole    Tablet 40 milliGRAM(s) Oral before breakfast  PARoxetine 20 milliGRAM(s) Oral daily  risperiDONE   Tablet 1 milliGRAM(s) Oral every 12 hours  sevelamer carbonate 2400 milliGRAM(s) Oral three times a day with meals  simvastatin 40 milliGRAM(s) Oral at bedtime    MEDICATIONS  (PRN):  dextrose 40% Gel 15 Gram(s) Oral once PRN Blood Glucose LESS THAN 70 milliGRAM(s)/deciliter  glucagon  Injectable 1 milliGRAM(s) IntraMuscular once PRN Glucose LESS THAN 70 milligrams/deciliter  methocarbamol 500 milliGRAM(s) Oral every 12 hours PRN spasms  traMADol 25 milliGRAM(s) Oral every 8 hours PRN Severe Pain (7 - 10)    Vital Signs Last 24 Hrs  T(C): 36.7 (23 Dec 2019 15:51), Max: 37.2 (22 Dec 2019 20:42)  T(F): 98 (23 Dec 2019 15:51), Max: 99 (22 Dec 2019 20:42)  HR: 88 (23 Dec 2019 18:25) (74 - 95)  BP: 133/77 (23 Dec 2019 18:25) (103/62 - 143/72)  BP(mean): --  RR: 16 (23 Dec 2019 18:25) (15 - 20)  SpO2: 97% (23 Dec 2019 18:25) (97% - 100%)    ABG - ( 23 Dec 2019 16:52 )  pH, Arterial: 7.28  pH, Blood: x     /  pCO2: 68    /  pO2: 95    / HCO3: 31    / Base Excess: 4.1   /  SaO2: 97        LABS:                        7.5    8.63  )-----------( 392      ( 23 Dec 2019 13:21 )             26.7     12-23    134<L>  |  88<L>  |  67<H>  ----------------------------<  94  5.9<H>   |  29  |  10.97<H>    Ca    9.4      23 Dec 2019 13:21  Phos  6.4     12-22  Mg     2.5     12-22    TPro  6.9  /  Alb  3.3  /  TBili  0.5  /  DBili  x   /  AST  12  /  ALT  8<L>  /  AlkPhos  202<H>  12-23      PT/INR - ( 22 Dec 2019 18:14 )   PT: 12.9 sec;   INR: 1.12 ratio         PTT - ( 22 Dec 2019 18:14 )  PTT:33.7 sec      Physical Examination:    General: Lethargic/sleeping: difficult to awaken, though rousable. Non cooperative with history      HEENT: Pupils equal, reactive to light.  Symmetric.    PULM: Distant without audible wheeze or rhonchi    CVS: Regular rate and rhythm, no murmurs, rubs, or gallops    ABD: Soft, nondistended, nontender, normoactive bowel sounds, no masses    EXT: 4++ anasarca with chronic statsis changes LE    SKIN: Warm and well perfused, no rashes noted.    NEURO: Alert, oriented, interactive, nonfocal    RADIOLOGY REVIEWED PERSONALLY  CXR:    PROCEDURE DATE:  12/22/2019        INTERPRETATION:  CLINICAL INFORMATION: Shortness of breath.    EXAM: Frontal radiograph of the chest.    COMPARISON: Chest x-ray 12/11/2019.    FINDINGS:    Lines and Tubes: None.    Lungs: Mild pulmonary vascular congestion.    Pleura: No pleural effusion.    Mediastinum: Heart size cannot be assessed on this view.    Skeletal: Unremarkable.    Left upper thorax vascular stent noted.    IMPRESSION:    Mild pulmonary vascular congestion.  CT chest:    TTE:    PROCEDURE: Transthoracic echocardiogram with 2-D, M-Mode  and complete spectral and color flow Doppler.  INDICATION: Dyspnea, unspecified (R06.00)  HISTORY:  ------------------------------------------------------------------------  DIMENSIONS:  Dimensions:     Normal Values:  LA:     5.2 cm    2.0 - 4.0 cm  Ao:     3.5 cm    2.0 - 3.8 cm  SEPTUM: 1.2 cm    0.6 - 1.2 cm  PWT:    1.2 cm    0.6 - 1.1 cm  LVIDd:  6.5 cm    3.0 - 5.6 cm  LVIDs:  4.9 cm    1.8 - 4.0 cm      Derived Variables:  LVMI: 112 g/m2  RWT: 0.36  Ejection Fraction Visual Estimate: 55 %    ------------------------------------------------------------------------  OBSERVATIONS:  Mitral Valve: Normal mitral valve. Mild mitral  regurgitation.  Aortic Root: Aortic Root: 3.5 cm.    Aortic Valve: Normal trileaflet aortic valve.  Left Atrium: Normal left atrium.  LA volume index = 22  cc/m2.  Left Ventricle: Endocardium not well visualized; grossly  normal left ventricular systolic function. Mild concentric  left ventricular hypertrophy. Grade III diastolic  dysfunction.  Right Heart: Normal right atrium. Normal right ventricular  size and systolic function (TAPSE  3.0cm). There is mild  tricuspid regurgitation. There is mild pulmonic  regurgitation.  Pericardium/PleuraTrivial pericardial effusion is seen.  Hemodynamic: RV systolic pressure is mildly increased at  44 mm Hg.  ------------------------------------------------------------------------  CONCLUSIONS:  1. Normal mitral valve. Mild mitral regurgitation.  2. Normal trileaflet aortic valve.  3. Aortic Root: 3.5 cm.    4. Normal left atrium.  LA volume index = 22 cc/m2.  5. Mild concentric left ventricular hypertrophy.  6. Endocardium not well visualized; grossly normal left  ventricular systolic function.  7. Grade III diastolic dysfunction.  8. Normal right atrium.  9. Normal right ventricular size and systolic function  (TAPSE  3.0cm).  10. RV systolic pressure is mildly increased at  44 mm Hg.  11. There is mild tricuspid regurgitation.  12. There is mild pulmonic regurgitation.  13. Trivial pericardial effusion is seen.

## 2019-12-23 NOTE — H&P ADULT - NSHPPHYSICALEXAM_GEN_ALL_CORE
Vital Signs Last 24 Hrs  T(C): 36.7 (23 Dec 2019 15:51), Max: 37.2 (22 Dec 2019 17:36)  T(F): 98 (23 Dec 2019 15:51), Max: 99 (22 Dec 2019 20:42)  HR: 78 (23 Dec 2019 15:51) (74 - 95)  BP: 114/64 (23 Dec 2019 15:51) (103/62 - 143/72)  BP(mean): --  RR: 16 (23 Dec 2019 15:51) (15 - 20)  SpO2: 97% (23 Dec 2019 15:51) (97% - 100%)    PHYSICAL EXAM:  GENERAL: NAD, morbidly obese  HEAD:  Atraumatic, Normocephalic  EYES: EOMI, PERRLA, conjunctiva and sclera clear  NECK: Supple   CHEST/LUNG: Clear to auscultation bilaterally; No wheeze  HEART: Regular rate and rhythm; No murmurs, rubs, or gallops  ABDOMEN: Soft, Nontender, Nondistended; Bowel sounds present  EXTREMITIES:  2+ Peripheral Pulses, No clubbing, cyanosis, + edema  PSYCH: AAOx3  NEUROLOGY: moves all ext   SKIN: No rashes or lesions

## 2019-12-23 NOTE — H&P ADULT - HISTORY OF PRESENT ILLNESS
41 y/o M pt with multiple co morbid conditions including ESRD on hemodialysis 3 x weekly, COPD on home oxygen at baseline, obesity, HTN, DM2, migraine HA, schizophrenia and bipolar disorder, came for eval of AVF due to decreased thrill, then during ED stay RN who found patient slumped over in bed requiring sternal rub.  at time of evaluation. vitals stable. admitted for near syncope work up  Patient receives HD at [facility] via [facility] MWF, missed Ch[place] holiday scheduled HD

## 2019-12-23 NOTE — H&P ADULT - ASSESSMENT
39 yo male extensive pmhx as noted above including esrd on hd, copd on o2, obesity, htn, dm, migraine, schizo, bipolar, a/w presyncope and fluid overload    fluid overload  renal consulted  plan for HD today as per renal    presyncope  check CT head  check echo  cards consulted    DM  insulin as per endo    copd/alice  on home o2  pulm consulted    cont all prior home meds    dvt ppx          Advanced care planning was discussed with patient and family.  Advanced care planning forms were reviewed and discussed.  Differential diagnosis and plan of care discussed with patient after the evaluation.   Pain assessed and judicious use of narcotics when appropriate was discussed.  Importance of Fall prevention discussed.  Counseling on Smoking and Alcohol cessation was offered when appropriate.  Counseling on Diet, exercise, and medication compliance was done.

## 2019-12-24 DIAGNOSIS — R06.02 SHORTNESS OF BREATH: ICD-10-CM

## 2019-12-24 LAB
ALBUMIN SERPL ELPH-MCNC: 3.3 G/DL — SIGNIFICANT CHANGE UP (ref 3.3–5)
ALP SERPL-CCNC: 217 U/L — HIGH (ref 40–120)
ALT FLD-CCNC: 8 U/L — LOW (ref 10–45)
ANION GAP SERPL CALC-SCNC: 17 MMOL/L — SIGNIFICANT CHANGE UP (ref 5–17)
AST SERPL-CCNC: 11 U/L — SIGNIFICANT CHANGE UP (ref 10–40)
BASE EXCESS BLDA CALC-SCNC: 4.7 MMOL/L — HIGH (ref -2–2)
BILIRUB DIRECT SERPL-MCNC: 0.2 MG/DL — SIGNIFICANT CHANGE UP (ref 0–0.2)
BILIRUB INDIRECT FLD-MCNC: 0.2 MG/DL — SIGNIFICANT CHANGE UP (ref 0.2–1)
BILIRUB SERPL-MCNC: 0.4 MG/DL — SIGNIFICANT CHANGE UP (ref 0.2–1.2)
BUN SERPL-MCNC: 43 MG/DL — HIGH (ref 7–23)
CALCIUM SERPL-MCNC: 9.4 MG/DL — SIGNIFICANT CHANGE UP (ref 8.4–10.5)
CHLORIDE SERPL-SCNC: 91 MMOL/L — LOW (ref 96–108)
CO2 BLDA-SCNC: 33 MMOL/L — HIGH (ref 22–30)
CO2 SERPL-SCNC: 28 MMOL/L — SIGNIFICANT CHANGE UP (ref 22–31)
CREAT SERPL-MCNC: 8.01 MG/DL — HIGH (ref 0.5–1.3)
GAS PNL BLDA: SIGNIFICANT CHANGE UP
GLUCOSE BLDC GLUCOMTR-MCNC: 116 MG/DL — HIGH (ref 70–99)
GLUCOSE BLDC GLUCOMTR-MCNC: 124 MG/DL — HIGH (ref 70–99)
GLUCOSE BLDC GLUCOMTR-MCNC: 125 MG/DL — HIGH (ref 70–99)
GLUCOSE BLDC GLUCOMTR-MCNC: 150 MG/DL — HIGH (ref 70–99)
GLUCOSE BLDC GLUCOMTR-MCNC: 88 MG/DL — SIGNIFICANT CHANGE UP (ref 70–99)
GLUCOSE SERPL-MCNC: 129 MG/DL — HIGH (ref 70–99)
HCO3 BLDA-SCNC: 31 MMOL/L — HIGH (ref 21–29)
HCT VFR BLD CALC: 26.9 % — LOW (ref 39–50)
HGB BLD-MCNC: 7.8 G/DL — LOW (ref 13–17)
MAGNESIUM SERPL-MCNC: 2.4 MG/DL — SIGNIFICANT CHANGE UP (ref 1.6–2.6)
MCHC RBC-ENTMCNC: 28.2 PG — SIGNIFICANT CHANGE UP (ref 27–34)
MCHC RBC-ENTMCNC: 29 GM/DL — LOW (ref 32–36)
MCV RBC AUTO: 97.1 FL — SIGNIFICANT CHANGE UP (ref 80–100)
PCO2 BLDA: 62 MMHG — HIGH (ref 32–46)
PH BLDA: 7.32 — LOW (ref 7.35–7.45)
PHOSPHATE SERPL-MCNC: 5.5 MG/DL — HIGH (ref 2.5–4.5)
PLATELET # BLD AUTO: 376 K/UL — SIGNIFICANT CHANGE UP (ref 150–400)
PO2 BLDA: 165 MMHG — HIGH (ref 74–108)
POTASSIUM SERPL-MCNC: 4.7 MMOL/L — SIGNIFICANT CHANGE UP (ref 3.5–5.3)
POTASSIUM SERPL-SCNC: 4.7 MMOL/L — SIGNIFICANT CHANGE UP (ref 3.5–5.3)
PROT SERPL-MCNC: 7 G/DL — SIGNIFICANT CHANGE UP (ref 6–8.3)
RBC # BLD: 2.77 M/UL — LOW (ref 4.2–5.8)
RBC # FLD: 17.9 % — HIGH (ref 10.3–14.5)
SAO2 % BLDA: 100 % — HIGH (ref 92–96)
SODIUM SERPL-SCNC: 136 MMOL/L — SIGNIFICANT CHANGE UP (ref 135–145)
WBC # BLD: 7.81 K/UL — SIGNIFICANT CHANGE UP (ref 3.8–10.5)
WBC # FLD AUTO: 7.81 K/UL — SIGNIFICANT CHANGE UP (ref 3.8–10.5)

## 2019-12-24 RX ORDER — OXYCODONE HYDROCHLORIDE 5 MG/1
5 TABLET ORAL ONCE
Refills: 0 | Status: DISCONTINUED | OUTPATIENT
Start: 2019-12-24 | End: 2019-12-24

## 2019-12-24 RX ORDER — INFLUENZA VIRUS VACCINE 15; 15; 15; 15 UG/.5ML; UG/.5ML; UG/.5ML; UG/.5ML
0.5 SUSPENSION INTRAMUSCULAR ONCE
Refills: 0 | Status: COMPLETED | OUTPATIENT
Start: 2019-12-24 | End: 2019-12-24

## 2019-12-24 RX ORDER — OXYCODONE AND ACETAMINOPHEN 5; 325 MG/1; MG/1
1 TABLET ORAL ONCE
Refills: 0 | Status: DISCONTINUED | OUTPATIENT
Start: 2019-12-24 | End: 2019-12-24

## 2019-12-24 RX ADMIN — Medication 50 MILLIGRAM(S): at 03:19

## 2019-12-24 RX ADMIN — Medication 0.5 MILLIGRAM(S): at 06:39

## 2019-12-24 RX ADMIN — ALBUTEROL 2 PUFF(S): 90 AEROSOL, METERED ORAL at 03:20

## 2019-12-24 RX ADMIN — SIMVASTATIN 40 MILLIGRAM(S): 20 TABLET, FILM COATED ORAL at 03:20

## 2019-12-24 RX ADMIN — SEVELAMER CARBONATE 2400 MILLIGRAM(S): 2400 POWDER, FOR SUSPENSION ORAL at 08:24

## 2019-12-24 RX ADMIN — OXYCODONE HYDROCHLORIDE 5 MILLIGRAM(S): 5 TABLET ORAL at 19:52

## 2019-12-24 RX ADMIN — ALBUTEROL 2 PUFF(S): 90 AEROSOL, METERED ORAL at 08:30

## 2019-12-24 RX ADMIN — HEPARIN SODIUM 5000 UNIT(S): 5000 INJECTION INTRAVENOUS; SUBCUTANEOUS at 08:51

## 2019-12-24 RX ADMIN — HEPARIN SODIUM 5000 UNIT(S): 5000 INJECTION INTRAVENOUS; SUBCUTANEOUS at 22:58

## 2019-12-24 RX ADMIN — MIRTAZAPINE 7.5 MILLIGRAM(S): 45 TABLET, ORALLY DISINTEGRATING ORAL at 22:59

## 2019-12-24 RX ADMIN — ALBUTEROL 2 PUFF(S): 90 AEROSOL, METERED ORAL at 17:06

## 2019-12-24 RX ADMIN — TRAMADOL HYDROCHLORIDE 25 MILLIGRAM(S): 50 TABLET ORAL at 02:53

## 2019-12-24 RX ADMIN — ERYTHROPOIETIN 20000 UNIT(S): 10000 INJECTION, SOLUTION INTRAVENOUS; SUBCUTANEOUS at 12:47

## 2019-12-24 RX ADMIN — Medication 50 MILLIGRAM(S): at 08:51

## 2019-12-24 RX ADMIN — ALBUTEROL 2 PUFF(S): 90 AEROSOL, METERED ORAL at 22:58

## 2019-12-24 RX ADMIN — CLOPIDOGREL BISULFATE 75 MILLIGRAM(S): 75 TABLET, FILM COATED ORAL at 11:16

## 2019-12-24 RX ADMIN — Medication 80 MILLIGRAM(S): at 06:39

## 2019-12-24 RX ADMIN — CARVEDILOL PHOSPHATE 6.25 MILLIGRAM(S): 80 CAPSULE, EXTENDED RELEASE ORAL at 06:40

## 2019-12-24 RX ADMIN — GABAPENTIN 300 MILLIGRAM(S): 400 CAPSULE ORAL at 03:20

## 2019-12-24 RX ADMIN — Medication 50 MILLIGRAM(S): at 16:25

## 2019-12-24 RX ADMIN — CARVEDILOL PHOSPHATE 6.25 MILLIGRAM(S): 80 CAPSULE, EXTENDED RELEASE ORAL at 17:06

## 2019-12-24 RX ADMIN — PANTOPRAZOLE SODIUM 40 MILLIGRAM(S): 20 TABLET, DELAYED RELEASE ORAL at 06:39

## 2019-12-24 RX ADMIN — MIRTAZAPINE 7.5 MILLIGRAM(S): 45 TABLET, ORALLY DISINTEGRATING ORAL at 03:19

## 2019-12-24 RX ADMIN — SEVELAMER CARBONATE 2400 MILLIGRAM(S): 2400 POWDER, FOR SUSPENSION ORAL at 17:06

## 2019-12-24 RX ADMIN — RISPERIDONE 1 MILLIGRAM(S): 4 TABLET ORAL at 06:39

## 2019-12-24 RX ADMIN — ALBUTEROL 2 PUFF(S): 90 AEROSOL, METERED ORAL at 11:16

## 2019-12-24 RX ADMIN — HEPARIN SODIUM 5000 UNIT(S): 5000 INJECTION INTRAVENOUS; SUBCUTANEOUS at 16:02

## 2019-12-24 RX ADMIN — OXYCODONE HYDROCHLORIDE 5 MILLIGRAM(S): 5 TABLET ORAL at 20:30

## 2019-12-24 RX ADMIN — ISOSORBIDE MONONITRATE 30 MILLIGRAM(S): 60 TABLET, EXTENDED RELEASE ORAL at 15:44

## 2019-12-24 RX ADMIN — Medication 1 MILLIGRAM(S): at 11:16

## 2019-12-24 RX ADMIN — HEPARIN SODIUM 5000 UNIT(S): 5000 INJECTION INTRAVENOUS; SUBCUTANEOUS at 03:18

## 2019-12-24 RX ADMIN — Medication 500 MILLIGRAM(S): at 11:16

## 2019-12-24 RX ADMIN — Medication 50 MILLIGRAM(S): at 22:59

## 2019-12-24 RX ADMIN — RISPERIDONE 1 MILLIGRAM(S): 4 TABLET ORAL at 17:07

## 2019-12-24 RX ADMIN — Medication 20 MILLIGRAM(S): at 15:44

## 2019-12-24 RX ADMIN — OXYCODONE HYDROCHLORIDE 5 MILLIGRAM(S): 5 TABLET ORAL at 04:58

## 2019-12-24 RX ADMIN — SIMVASTATIN 40 MILLIGRAM(S): 20 TABLET, FILM COATED ORAL at 22:59

## 2019-12-24 RX ADMIN — OXYCODONE HYDROCHLORIDE 5 MILLIGRAM(S): 5 TABLET ORAL at 05:28

## 2019-12-24 RX ADMIN — Medication 0.5 MILLIGRAM(S): at 17:39

## 2019-12-24 RX ADMIN — GABAPENTIN 300 MILLIGRAM(S): 400 CAPSULE ORAL at 22:59

## 2019-12-24 RX ADMIN — Medication 325 MILLIGRAM(S): at 11:16

## 2019-12-24 NOTE — PROGRESS NOTE ADULT - ASSESSMENT
39 yo male extensive pmhx as noted above including esrd on hd, copd on o2, obesity, htn, dm, migraine, schizo, bipolar, a/w presyncope and fluid overload    fluid overload  renal consulted  plan for HD as per renal    presyncope   CT head noted   check echo  cards consulted    DM  insulin as per endo    copd/alice  on home o2  pulm consulted    cont all prior home meds    dvt ppx    PT          Advanced care planning was discussed with patient and family.  Advanced care planning forms were reviewed and discussed.  Differential diagnosis and plan of care discussed with patient after the evaluation.   Pain assessed and judicious use of narcotics when appropriate was discussed.  Importance of Fall prevention discussed.  Counseling on Smoking and Alcohol cessation was offered when appropriate.  Counseling on Diet, exercise, and medication compliance was done.

## 2019-12-24 NOTE — CONSULT NOTE ADULT - SUBJECTIVE AND OBJECTIVE BOX
CHIEF COMPLAINT:  Presyncope, SOB       HISTORY OF PRESENT ILLNESS:  39 y/o M pt with multiple co morbid conditions including ESRD on hemodialysis 3 x weekly, COPD on home oxygen at baseline, obesity, HTN, DM2, migraine HA, schizophrenia and bipolar disorder, came for eval of AVF due to decreased thrill, then during ED stay RN who found patient slumped over in bed requiring sternal rub.  at time of evaluation. vitals stable. admitted for near syncope work up  Patient receives HD at [facility] via [facility] MWF, missed Sunday holiday scheduled HD      PAST MEDICAL & SURGICAL HISTORY:  COPD (chronic obstructive pulmonary disease)  Migraine  HTN (hypertension)  DM (diabetes mellitus)  Bipolar disorder  Schizophrenia  ESRD on dialysis  Morbid obesity with BMI of 40.0-44.9, adult  H/O hernia repair          MEDICATIONS:  carvedilol 6.25 milliGRAM(s) Oral every 12 hours  clopidogrel Tablet 75 milliGRAM(s) Oral daily  furosemide    Tablet 80 milliGRAM(s) Oral daily  heparin  Injectable 5000 Unit(s) SubCutaneous every 8 hours  hydrALAZINE 50 milliGRAM(s) Oral three times a day  isosorbide   mononitrate ER Tablet (IMDUR) 30 milliGRAM(s) Oral daily      ALBUTerol    90 MICROgram(s) HFA Inhaler 2 Puff(s) Inhalation every 6 hours    clonazePAM  Tablet 0.5 milliGRAM(s) Oral every 12 hours  gabapentin 300 milliGRAM(s) Oral at bedtime  methocarbamol 500 milliGRAM(s) Oral every 12 hours PRN  mirtazapine 7.5 milliGRAM(s) Oral at bedtime  PARoxetine 20 milliGRAM(s) Oral daily  risperiDONE   Tablet 1 milliGRAM(s) Oral every 12 hours  traMADol 25 milliGRAM(s) Oral every 8 hours PRN    pantoprazole    Tablet 40 milliGRAM(s) Oral before breakfast    dextrose 40% Gel 15 Gram(s) Oral once PRN  dextrose 50% Injectable 12.5 Gram(s) IV Push once  dextrose 50% Injectable 25 Gram(s) IV Push once  dextrose 50% Injectable 25 Gram(s) IV Push once  glucagon  Injectable 1 milliGRAM(s) IntraMuscular once PRN  insulin lispro (HumaLOG) corrective regimen sliding scale   SubCutaneous three times a day before meals  simvastatin 40 milliGRAM(s) Oral at bedtime    ascorbic acid 500 milliGRAM(s) Oral daily  chlorhexidine 4% Liquid 1 Application(s) Topical once  dextrose 5%. 1000 milliLiter(s) IV Continuous <Continuous>  epoetin cyndie Injectable 88714 Unit(s) IV Push <User Schedule>  ferrous    sulfate 325 milliGRAM(s) Oral daily  folic acid 1 milliGRAM(s) Oral daily      FAMILY HISTORY:  Family history of COPD (chronic obstructive pulmonary disease)  Family history of diabetes mellitus      SOCIAL HISTORY:    [ ] Non-smoker  [ ] Smoker  [ ] Alcohol    Allergies    aspirin (Swelling)  fish (Unknown)  penicillins (Swelling)  shellfish (Unknown)    Intolerances    	    REVIEW OF SYSTEMS:  CONSTITUTIONAL: No fever, weight loss,+ fatigue  EYES: No eye pain, visual disturbances, or discharge  ENMT:  No difficulty hearing, tinnitus, vertigo; No sinus or throat pain  NECK: No pain or stiffness  RESPIRATORY: No cough, wheezing, chills or hemoptysis; No Shortness of Breath  CARDIOVASCULAR: No chest pain, palpitations, passing out, dizziness, or leg swelling  GASTROINTESTINAL: No abdominal or epigastric pain. No nausea, vomiting, or hematemesis; No diarrhea or constipation. No melena or hematochezia.  GENITOURINARY: No dysuria, frequency, hematuria, or incontinence  NEUROLOGICAL: No headaches, memory loss, loss of strength, numbness, or tremors  SKIN: No itching, burning, rashes, or lesions   LYMPH Nodes: No enlarged glands  ENDOCRINE: No heat or cold intolerance; No hair loss  MUSCULOSKELETAL: +joint pain or swelling; No muscle, back, or extremity pain  PSYCHIATRIC: No depression, anxiety, mood swings, or difficulty sleeping  HEME/LYMPH: No easy bruising, or bleeding gums  ALLERY AND IMMUNOLOGIC: No hives or eczema	    [ ] All others negative	  [ ] Unable to obtain    PHYSICAL EXAM:  T(C): 37.4 (12-24-19 @ 10:12), Max: 37.4 (12-24-19 @ 10:12)  HR: 82 (12-24-19 @ 10:12) (78 - 95)  BP: 100/58 (12-24-19 @ 10:12) (100/58 - 138/81)  RR: 20 (12-24-19 @ 10:12) (16 - 20)  SpO2: 98% (12-24-19 @ 10:12) (97% - 100%)  Wt(kg): --  I&O's Summary    23 Dec 2019 07:01  -  24 Dec 2019 07:00  --------------------------------------------------------  IN: 1380 mL / OUT: 5425 mL / NET: -4045 mL        Appearance: Morbidly obese   HEENT:   dry  oral mucosa, PERRL, EOMI	  Lymphatic: No lymphadenopathy  Cardiovascular: Normal S1 S2, No JVD,   Respiratory: +crackles   Psychiatry: A & O x 3, Mood & affect appropriate  Gastrointestinal:  Soft, Non-tender, + BS	  Skin: No rashes, No ecchymoses, No cyanosis	  Neurologic: Non-focal  Extremities: Normal range of motion, No clubbing, cyanosis or edema  Vascular: Peripheral pulses palpable 2+ bilaterally  LUE AVF +thrill       TELEMETRY: 	SR    ECG:  	NSR, no acute ischemic stt changes   RADIOLOGY:  < from: Xray Chest 1 View- PORTABLE-Urgent (12.22.19 @ 23:00) >    EXAM:  XR CHEST PORTABLE URGENT 1V                            PROCEDURE DATE:  12/22/2019            INTERPRETATION:  CLINICAL INFORMATION: Shortness of breath.    EXAM: Frontal radiograph of the chest.    COMPARISON: Chest x-ray 12/11/2019.    FINDINGS:    Lines and Tubes: None.    Lungs: Mild pulmonary vascular congestion.    Pleura: No pleural effusion.    Mediastinum: Heart size cannot be assessed on this view.    Skeletal: Unremarkable.    Left upper thorax vascular stent noted.    IMPRESSION:    Mild pulmonary vascular congestion.                ROGER AKBAR M.D., RADIOLOGY RESIDENT  This document has been electronically signed.  ANETA VARNER M.D., ATTENDING RADIOLOGIST  This document has been electronically signed. Dec 23 2019  7:49AM                < end of copied text >    OTHER: 	  	  LABS:	 	    CARDIAC MARKERS:                                  7.8    7.81  )-----------( 376      ( 24 Dec 2019 08:49 )             26.9     12-24    136  |  91<L>  |  43<H>  ----------------------------<  129<H>  4.7   |  28  |  8.01<H>    Ca    9.4      24 Dec 2019 07:43  Phos  5.5     12-24  Mg     2.4     12-24    TPro  7.0  /  Alb  3.3  /  TBili  0.4  /  DBili  0.2  /  AST  11  /  ALT  8<L>  /  AlkPhos  217<H>  12-24    proBNP:   Lipid Profile:   HgA1c:   TSH:

## 2019-12-24 NOTE — PROGRESS NOTE ADULT - ASSESSMENT
ACute on chronic hypercapneic respiratory failure  Morbid obesity: r/o sleep apnea, obesity h ypoventilation  Acute on chronci HfPef  Pulmonary edme  ESRD with anasarca, fluid overload    REC    HD today: will need ongoing aggresive fluid remova  WIll Try nasal CPAP (patient agrees to try) 8 cm for nocturnal use  F/U ABG over next few days after more dialysis completed  OBserve off abx  Continue duoneb qid

## 2019-12-24 NOTE — DIETITIAN INITIAL EVALUATION ADULT. - PHYSICAL APPEARANCE
obese/other (specify) Ht 79 inches Wt 439.6 lbs. BMI 49.5  lbs. %%  Skin: blisters on lower extremities, as per nursing documentation  Edema: 1+ generalized; +2 L+R foot, as per nursing documentation

## 2019-12-24 NOTE — PROGRESS NOTE ADULT - SUBJECTIVE AND OBJECTIVE BOX
PRESTON JOHNSON  39y Male  MRN:[id number]    Patient is a 39y old  Male who presents with a chief complaint of presyncope (24 Dec 2019 11:58)    HPI:  41 y/o M pt with multiple co morbid conditions including ESRD on hemodialysis 3 x weekly, COPD on home oxygen at baseline, obesity, HTN, DM2, migraine HA, schizophrenia and bipolar disorder, came for eval of AVF due to decreased thrill, then during ED stay RN who found patient slumped over in bed requiring sternal rub.  at time of evaluation. vitals stable. admitted for near syncope work up  Patient receives HD at [facility] via [facility] MWF, missed Sunday holiday scheduled HD (23 Dec 2019 13:24)      Patient seen and evaluated at bedside. No acute events overnight except as noted.    Interval HPI: no acute events o/n     PAST MEDICAL & SURGICAL HISTORY:  COPD (chronic obstructive pulmonary disease)  Migraine  HTN (hypertension)  DM (diabetes mellitus)  Bipolar disorder  Schizophrenia  ESRD on dialysis  Morbid obesity with BMI of 40.0-44.9, adult  H/O hernia repair      REVIEW OF SYSTEMS:  as per hpi    VITALS:  Vital Signs Last 24 Hrs  T(C): 36.9 (24 Dec 2019 12:01), Max: 37.4 (24 Dec 2019 10:12)  T(F): 98.4 (24 Dec 2019 12:01), Max: 99.3 (24 Dec 2019 10:12)  HR: 77 (24 Dec 2019 12:01) (77 - 95)  BP: 97/50 (24 Dec 2019 12:01) (97/50 - 138/81)  BP(mean): --  RR: 19 (24 Dec 2019 12:01) (16 - 20)  SpO2: 98% (24 Dec 2019 12:01) (97% - 100%)  CAPILLARY BLOOD GLUCOSE  88 (23 Dec 2019 16:00)      POCT Blood Glucose.: 124 mg/dL (24 Dec 2019 07:50)  POCT Blood Glucose.: 89 mg/dL (23 Dec 2019 18:59)  POCT Blood Glucose.: 88 mg/dL (23 Dec 2019 16:23)    I&O's Summary    23 Dec 2019 07:01  -  24 Dec 2019 07:00  --------------------------------------------------------  IN: 1380 mL / OUT: 5425 mL / NET: -4045 mL        PHYSICAL EXAM:  GENERAL: NAD, well-developed, obese  HEAD:  Atraumatic, Normocephalic  EYES: EOMI, PERRLA, conjunctiva and sclera clear  NECK: Supple, No JVD  CHEST/LUNG: Clear to auscultation bilaterally; No wheeze  HEART: S1, S2; No murmurs, rubs, or gallops  ABDOMEN: Soft, Nontender, Nondistended; Bowel sounds present  EXTREMITIES:  2+ Peripheral Pulses, No clubbing, cyanosis, or edema  PSYCH: Normal affect     Consultant(s) Notes Reviewed:  [x ] YES  [ ] NO  Care Discussed with Consultants/Other Providers [ x] YES  [ ] NO    MEDS:  MEDICATIONS  (STANDING):  ALBUTerol    90 MICROgram(s) HFA Inhaler 2 Puff(s) Inhalation every 6 hours  ascorbic acid 500 milliGRAM(s) Oral daily  carvedilol 6.25 milliGRAM(s) Oral every 12 hours  chlorhexidine 4% Liquid 1 Application(s) Topical once  clonazePAM  Tablet 0.5 milliGRAM(s) Oral every 12 hours  clopidogrel Tablet 75 milliGRAM(s) Oral daily  dextrose 5%. 1000 milliLiter(s) (50 mL/Hr) IV Continuous <Continuous>  dextrose 50% Injectable 12.5 Gram(s) IV Push once  dextrose 50% Injectable 25 Gram(s) IV Push once  dextrose 50% Injectable 25 Gram(s) IV Push once  epoetin cyndie Injectable 60271 Unit(s) IV Push <User Schedule>  ferrous    sulfate 325 milliGRAM(s) Oral daily  folic acid 1 milliGRAM(s) Oral daily  furosemide    Tablet 80 milliGRAM(s) Oral daily  gabapentin 300 milliGRAM(s) Oral at bedtime  heparin  Injectable 5000 Unit(s) SubCutaneous every 8 hours  hydrALAZINE 50 milliGRAM(s) Oral three times a day  insulin lispro (HumaLOG) corrective regimen sliding scale   SubCutaneous three times a day before meals  isosorbide   mononitrate ER Tablet (IMDUR) 30 milliGRAM(s) Oral daily  mirtazapine 7.5 milliGRAM(s) Oral at bedtime  pantoprazole    Tablet 40 milliGRAM(s) Oral before breakfast  PARoxetine 20 milliGRAM(s) Oral daily  risperiDONE   Tablet 1 milliGRAM(s) Oral every 12 hours  sevelamer carbonate 2400 milliGRAM(s) Oral three times a day with meals  simvastatin 40 milliGRAM(s) Oral at bedtime    MEDICATIONS  (PRN):  dextrose 40% Gel 15 Gram(s) Oral once PRN Blood Glucose LESS THAN 70 milliGRAM(s)/deciliter  glucagon  Injectable 1 milliGRAM(s) IntraMuscular once PRN Glucose LESS THAN 70 milligrams/deciliter  methocarbamol 500 milliGRAM(s) Oral every 12 hours PRN spasms  traMADol 25 milliGRAM(s) Oral every 8 hours PRN Severe Pain (7 - 10)    ALLERGIES:  aspirin (Swelling)  fish (Unknown)  penicillins (Swelling)  shellfish (Unknown)      LABS:                        7.8    7.81  )-----------( 376      ( 24 Dec 2019 08:49 )             26.9     12-24    136  |  91<L>  |  43<H>  ----------------------------<  129<H>  4.7   |  28  |  8.01<H>    Ca    9.4      24 Dec 2019 07:43  Phos  5.5     12-24  Mg     2.4     12-24    TPro  7.0  /  Alb  3.3  /  TBili  0.4  /  DBili  0.2  /  AST  11  /  ALT  8<L>  /  AlkPhos  217<H>  12-24    PT/INR - ( 22 Dec 2019 18:14 )   PT: 12.9 sec;   INR: 1.12 ratio         PTT - ( 22 Dec 2019 18:14 )  PTT:33.7 sec      LIVER FUNCTIONS - ( 24 Dec 2019 07:43 )  Alb: 3.3 g/dL / Pro: 7.0 g/dL / ALK PHOS: 217 U/L / ALT: 8 U/L / AST: 11 U/L / GGT: x              < from: CT Head No Cont (12.23.19 @ 18:03) >    IMPRESSION: Unremarkable noncontrast CT of the brain. No change from 2/21/2019.        < end of copied text >

## 2019-12-24 NOTE — PROGRESS NOTE ADULT - PROBLEM SELECTOR PLAN 1
Tolerating UF today but still very volume overloaded  providing UF as tolerated, at 150kg 13ml/hr could receive 1950cc/hr of UF  Scheduled for 5hrs 6kg UF on 12/26/19 and UF the day after  Dose meds for ESRD  Daily BMP

## 2019-12-24 NOTE — DIETITIAN INITIAL EVALUATION ADULT. - ADD RECOMMEND
1. Continue renal restrictions diet as ordered. 2. Add Nepro 2x daily to help optimize PO intake; Nepro to add (850 kcal and 38 g protein). 3. Add Nephro-mauricio multivitamin 1x day. 4. Monitor labs, weight, PO intake, skin integrity, bowel movement. 5. RD to remain available. 1. Continue renal restrictions diet as ordered. 2. Add Nepro 2x daily to help optimize PO intake - for Pt's varying appetite; Nepro to add (850 kcal and 38 g protein). 3. Add Nephro-mauricio multivitamin 1x day. 4. Monitor labs, weight, PO intake, skin integrity, bowel movement. 5. RD to remain available.

## 2019-12-24 NOTE — PATIENT PROFILE ADULT - FALLEN IN THE PAST
----- Message from Estephanie Beavers sent at 5/9/2017  1:23 PM CDT -----  Contact: MARIAELENA SEPULVEDA [[id number]]  _  1st Request  _  2nd Request  _  3rd Request        Who: MARIAELENA SEPULVEDA [[id number]]    Why: pt would like to schedule surgery, Please call, Thanks!    What Number to Call Back: [phone]    When to Expect a call back: (Before the end of the day)   -- if the call is after 12:00, the call back will be tomorrow.                       yes

## 2019-12-24 NOTE — DIETITIAN INITIAL EVALUATION ADULT. - FACTORS AFF FOOD INTAKE
other (specify)/change in mental status/none/lethargy lethargy/other (specify)/change in mental status

## 2019-12-24 NOTE — DIETITIAN INITIAL EVALUATION ADULT. - PERTINENT MEDS FT
Plavix, Heparin, Dextrose 5 at 50 mL/hr, Humalog corrective scale, Vitamin C, Coreg, Proventil, Epoetin Wilfred, Ferrous Sulfate, Folic Acid, Lasix, gabapentin, Apresoline, Remeron, Protonix, Paxil, Renvela, Zocor, Ultram Dextrose 5 at 50 mL/hr, Humalog corrective scale, Vitamin C, Coreg, Ferrous Sulfate, Folic Acid, Lasix, Gabapentin, Remeron, Protonix, Renvela, Zocor

## 2019-12-24 NOTE — PROGRESS NOTE ADULT - SUBJECTIVE AND OBJECTIVE BOX
[facility] - HUSSAIN Sanchez / HUSSAIN Villalobos / ERNESTO Mehta/ HUSSAIN Mauricio/ HUSSAIN Barber/ IVONNE Hui / JADEN La / BRANDON Alexander  ---------------------------------------------------------------------------------------------------------------  seen and examined today for ESRD on HD  Interval : tolerating UF session  VITALS:  T(F): 98.4 (12-24-19 @ 12:01), Max: 99.3 (12-24-19 @ 10:12)  HR: 77 (12-24-19 @ 12:01)  BP: 97/50 (12-24-19 @ 12:01)  RR: 19 (12-24-19 @ 12:01)  SpO2: 98% (12-24-19 @ 12:01)  Wt(kg): --    12-23 @ 07:01  -  12-24 @ 07:00  --------------------------------------------------------  IN: 1380 mL / OUT: 5425 mL / NET: -4045 mL    12-24 @ 07:01  -  12-24 @ 14:09  --------------------------------------------------------  IN: 480 mL / OUT: 0 mL / NET: 480 mL      Physical Exam :-  Constitutional: NAD  Neck: Supple.  Respiratory: Bilateral equal breath sounds,  Cardiovascular: S1, S2 normal,  Gastrointestinal: Bowel Sounds present, soft, non tender.  Extremities: 3+ edema  Neurological: Alert and Oriented x 3, no focal deficits  Psychiatric: Normal mood, normal affect  Data:-  Allergies :   aspirin (Swelling)  fish (Unknown)  penicillins (Swelling)  shellfish (Unknown)    Hospital Medications:   MEDICATIONS  (STANDING):  ALBUTerol    90 MICROgram(s) HFA Inhaler 2 Puff(s) Inhalation every 6 hours  ascorbic acid 500 milliGRAM(s) Oral daily  carvedilol 6.25 milliGRAM(s) Oral every 12 hours  chlorhexidine 4% Liquid 1 Application(s) Topical once  clonazePAM  Tablet 0.5 milliGRAM(s) Oral every 12 hours  clopidogrel Tablet 75 milliGRAM(s) Oral daily  dextrose 5%. 1000 milliLiter(s) (50 mL/Hr) IV Continuous <Continuous>  dextrose 50% Injectable 12.5 Gram(s) IV Push once  dextrose 50% Injectable 25 Gram(s) IV Push once  dextrose 50% Injectable 25 Gram(s) IV Push once  epoetin cyndie Injectable 52129 Unit(s) IV Push <User Schedule>  ferrous    sulfate 325 milliGRAM(s) Oral daily  folic acid 1 milliGRAM(s) Oral daily  furosemide    Tablet 80 milliGRAM(s) Oral daily  gabapentin 300 milliGRAM(s) Oral at bedtime  heparin  Injectable 5000 Unit(s) SubCutaneous every 8 hours  hydrALAZINE 50 milliGRAM(s) Oral three times a day  insulin lispro (HumaLOG) corrective regimen sliding scale   SubCutaneous three times a day before meals  isosorbide   mononitrate ER Tablet (IMDUR) 30 milliGRAM(s) Oral daily  mirtazapine 7.5 milliGRAM(s) Oral at bedtime  pantoprazole    Tablet 40 milliGRAM(s) Oral before breakfast  PARoxetine 20 milliGRAM(s) Oral daily  risperiDONE   Tablet 1 milliGRAM(s) Oral every 12 hours  sevelamer carbonate 2400 milliGRAM(s) Oral three times a day with meals  simvastatin 40 milliGRAM(s) Oral at bedtime    12-24    136  |  91<L>  |  43<H>  ----------------------------<  129<H>  4.7   |  28  |  8.01<H>    Ca    9.4      24 Dec 2019 07:43  Phos  5.5     12-24  Mg     2.4     12-24    TPro  7.0  /  Alb  3.3  /  TBili  0.4  /  DBili  0.2  /  AST  11  /  ALT  8<L>  /  AlkPhos  217<H>  12-24    Creatinine Trend: 8.01 <--, 10.97 <--, 10.28 <--                        7.8    7.81  )-----------( 376      ( 24 Dec 2019 08:49 )             26.9

## 2019-12-24 NOTE — DIETITIAN INITIAL EVALUATION ADULT. - OTHER INFO
39 y/o M with PMHx of HTN, DM, COPD, Bipolar disorder, schizophrenia, ESRD on dialysis, Obesity; admitted for evaluation of AVF due to decreased thrill. Found with near syncope in ER requiring sternal rub.     Source: EMR    Intake PTA: Pt reports fair appetite and intake PTA, eating 3 meals a day at home. Lives with mom who does the food shopping/cooking and follows a normal diet. Pt endorses eating fruits, chicken, eggs and yogurt at home. Pt denies use of nutritional supplements or micronutrient supplementation PTA.     Pt reports: Pt states some improvement in appetite since admission. Pt reports he does not typically eat 3 meals a day in-house secondary to decreased appetite. Encouraged Pt to order nutrient dense snacks with meals to mimic smaller, frequent meals in-house. Pt receptive to Nephro-mauricio multivitamin; and Nepro 2x daily to drink in between meals to help optimize PO intake. Pt denies N+V, diarrhea/constipation with last BM ~5 days ago. Pt confirms NKA to fish/shellfish.     Education: Pt receptive to verbal and written renal diet education. Education provided on increased protein/energy needs on dialysis utilizing small frequent meals with restriction on high potassium, high phosphorus, and high sodium foods.     Weight Hx: Pt on HD MWF. Pt reports UBW of 448 lbs., time frame unknown. Pt currently weighs 439.6 lbs. (12/24), with Pre-HD weight of 379.1 lbs. and Post-HD weight of 369 lbs. yesterday (12/23); ?accuracy of bed scale. Previous weight taken on (12/13) shows Pre-HD weight of 468.7 lbs. and Post-HD weight of 460.5 lbs. As per chart, 41 y/o M with PMHx of HTN, DM, COPD, Bipolar disorder, schizophrenia, ESRD on dialysis, Obesity; admitted for evaluation of AVF. Found with near syncope in ER requiring sternal rub.     Intake PTA: Pt reports fair appetite and intake PTA, eating 3 meals a day at home. Lives with mom who does the food shopping/cooking and follows a normal diet. Pt states never receiving prior nutrition education regarding renal diet. Pt endorses eating fruits, chicken, eggs and yogurt at home. Pt denies use of nutritional supplements or micronutrient supplementation PTA.     Pt reports: Pt states some improvement in appetite since admission. Pt reports he does not typically eat 3 meals a day in-house secondary to decreased appetite. Encouraged Pt to order nutrient dense snacks with meals to mimic smaller, frequent meals in-house. Pt receptive to Nephro-mauricio multivitamin; and Nepro 2x daily to drink in between meals to help optimize PO intake. Pt denies N+V, diarrhea/constipation with last BM ~5 days ago, Pt denies any discomfort, consider bowel regimen. Pt confirms allergy to fish/shellfish.     Education: Pt receptive to verbal and written renal diet education. Education provided on increased protein/energy needs on dialysis utilizing small frequent meals with restriction on high potassium, high phosphorus, and high sodium foods.     Weight Hx: Pt on HD MWF. Pt reports UBW of 448 lbs., time frame unknown. Pt currently weighs 439.6 lbs. (12/24), with Pre-HD weight of 379.1 lbs. and Post-HD weight of 369 lbs. yesterday (12/23); ?accuracy of bed scale vs. fluid shifts. Previous weight taken on (12/13) shows Pre-HD weight of 468.7 lbs. and Post-HD weight of 460.5 lbs.

## 2019-12-24 NOTE — DIETITIAN INITIAL EVALUATION ADULT. - ETIOLOGY
related to increased protein/energy demand secondary to hemodialysis related to history of energy intake exceeding energy expenditure increased physiological demand lack of exposure to renal/healthy diet nutrition education

## 2019-12-24 NOTE — DIETITIAN INITIAL EVALUATION ADULT. - NUTRITIONGOAL OUTCOME2
pt to remain within < +/-5% of current weight (439.6 lbs) pt to provide 2 teach back points of diet education

## 2019-12-24 NOTE — PROGRESS NOTE ADULT - ASSESSMENT
39 y/o M pt with multiple co morbid conditions including ESRD on hemodialysis 3 x weekly, COPD on home oxygen at baseline, obesity, HTN, DM2, migraine HA, schizophrenia and bipolar disorder, came for eval of AVF due to decreased thrill, then during ED stay RN who found patient slumped over in bed requiring sternal rub.  at time of evaluation. vitals stable. admitted for near syncope work up  Patient receives HD at [facility] via [facility] MWF, missed Ch[place] holiday scheduled HD

## 2019-12-24 NOTE — PROGRESS NOTE ADULT - SUBJECTIVE AND OBJECTIVE BOX
Follow-up Pulm Progress Note  Manuelito Mendez MD  [phone]    Afebrile, hemodynamically stable  Viral PCR negative  Awake, alert, feels well: did not tolerate full face mask BIPAP  CUrrently on nasal oxygen, denies SOB  For HD today    Medications:  Vital Signs Last 24 Hrs  T(C): 36.9 (24 Dec 2019 03:10), Max: 36.9 (23 Dec 2019 21:30)  T(F): 98.5 (24 Dec 2019 03:10), Max: 98.5 (24 Dec 2019 03:10)  HR: 83 (24 Dec 2019 08:45) (78 - 95)  BP: 110/61 (24 Dec 2019 08:45) (103/62 - 138/81)  BP(mean): --  RR: 16 (24 Dec 2019 03:10) (16 - 18)  SpO2: 99% (24 Dec 2019 03:10) (97% - 100%)  ABG - ( 24 Dec 2019 07:13 )  pH, Arterial: 7.32  pH, Blood: x     /  pCO2: 62    /  pO2: 165   / HCO3: 31    / Base Excess: 4.7   /  SaO2: 100         12-23 @ 07:01  -  12-24 @ 07:00  --------------------------------------------------------  IN: 1380 mL / OUT: 5425 mL / NET: -4045 mL    LABS:                        7.5    8.63  )-----------( 392      ( 23 Dec 2019 13:21 )             26.7     12-24    136  |  91<L>  |  43<H>  ----------------------------<  129<H>  4.7   |  28  |  8.01<H>    Ca    9.4      24 Dec 2019 07:43  Phos  5.5     12-24  Mg     2.4     12-24    TPro  7.0  /  Alb  3.3  /  TBili  0.4  /  DBili  0.2  /  AST  11  /  ALT  8<L>  /  AlkPhos  217<H>  12-24      PT/INR - ( 22 Dec 2019 18:14 )   PT: 12.9 sec;   INR: 1.12 ratio         PTT - ( 22 Dec 2019 18:14 )  PTT:33.7 sec    Physical Examination:  PULM: No wheeze or rhonchi  CVS: Regular rate and rhythm, no murmurs, rubs, or gallops  ABD: Soft, non-tender  EXT:  No clubbing, cyanosis, or edema    RADIOLOGY REVIEWED  CXR:    CT chest:    TTE:

## 2019-12-24 NOTE — CHART NOTE - NSCHARTNOTEFT_GEN_A_CORE
Informed by Rn that pt was c/o 10/10 leg pain in both legs. Pt has history of neuropathic pain. Pt has received tramadol and as per Rn patient has had no pain relief with it. Pt seen and evaluated at bedside.     B/l leg pain    - Pt is alert and oriented x3.   - Pt with hx of neuropathic pain. Will give oxycodone 5mg IR x 1  -Will continue to follow and assess.

## 2019-12-24 NOTE — CHART NOTE - NSCHARTNOTEFT_GEN_A_CORE
Upon Nutritional Assessment by the Registered Dietitian your patient was determined to meet criteria / has evidence of the following diagnosis/diagnoses:          [ ]  Mild Protein Calorie Malnutrition        [ ]  Moderate Protein Calorie Malnutrition        [ ] Severe Protein Calorie Malnutrition        [ ] Unspecified Protein Calorie Malnutrition        [ ] Underweight / BMI <19        [x] Morbid Obesity / BMI > 40      Findings as based on:  [x] Comprehensive nutrition assessment   [ ] Nutrition Focused Physical Exam  [x] Other:  BMI 49.5 kg/m2      Nutrition Plan/Recommendations:    1) Provided nutrition education regarding renal dietary recommendations, RD is available to reinforce education and discuss weight loss recommendations as pt desires.    Tanika Maradiaga RD, CDN. Pager: [phone]       PROVIDER Section:     By signing this assessment you are acknowledging and agree with the diagnosis/diagnoses assigned by the Registered Dietitian    Comments:

## 2019-12-24 NOTE — DIETITIAN INITIAL EVALUATION ADULT. - PERTINENT LABORATORY DATA
BUN 43, Creatinine 8.01, GFR 8, Phos 5.5, K 4.7, Na 136, A1C 5.3 (10/25)  Finger Sticks:   88-89 (12/23)  124 (12/24) 12/23; BUN 43, Creatinine 8.01, GFR 8, Phos 5.5, K 4.7, Na 136  A1C 5.3 (10/25)  Finger Sticks:   88-89 (12/23)  124 (12/24)

## 2019-12-24 NOTE — DIETITIAN INITIAL EVALUATION ADULT. - SIGNS/SYMPTOMS
as evidenced by hemodialysis treatment and fair PO intake as evidenced by BMI of 49.5 hemodialysis treatment and fair PO intake verbalization of no prior education and obesity class 3 BMI 49.5

## 2019-12-25 LAB
ANION GAP SERPL CALC-SCNC: 16 MMOL/L — SIGNIFICANT CHANGE UP (ref 5–17)
BUN SERPL-MCNC: 50 MG/DL — HIGH (ref 7–23)
CALCIUM SERPL-MCNC: 9.8 MG/DL — SIGNIFICANT CHANGE UP (ref 8.4–10.5)
CHLORIDE SERPL-SCNC: 89 MMOL/L — LOW (ref 96–108)
CO2 SERPL-SCNC: 27 MMOL/L — SIGNIFICANT CHANGE UP (ref 22–31)
CREAT SERPL-MCNC: 8.87 MG/DL — HIGH (ref 0.5–1.3)
GLUCOSE BLDC GLUCOMTR-MCNC: 123 MG/DL — HIGH (ref 70–99)
GLUCOSE BLDC GLUCOMTR-MCNC: 127 MG/DL — HIGH (ref 70–99)
GLUCOSE BLDC GLUCOMTR-MCNC: 132 MG/DL — HIGH (ref 70–99)
GLUCOSE SERPL-MCNC: 112 MG/DL — HIGH (ref 70–99)
HCT VFR BLD CALC: 28.7 % — LOW (ref 39–50)
HGB BLD-MCNC: 8.2 G/DL — LOW (ref 13–17)
MCHC RBC-ENTMCNC: 28.3 PG — SIGNIFICANT CHANGE UP (ref 27–34)
MCHC RBC-ENTMCNC: 28.6 GM/DL — LOW (ref 32–36)
MCV RBC AUTO: 99 FL — SIGNIFICANT CHANGE UP (ref 80–100)
PLATELET # BLD AUTO: 408 K/UL — HIGH (ref 150–400)
POTASSIUM SERPL-MCNC: 5.2 MMOL/L — SIGNIFICANT CHANGE UP (ref 3.5–5.3)
POTASSIUM SERPL-SCNC: 5.2 MMOL/L — SIGNIFICANT CHANGE UP (ref 3.5–5.3)
RBC # BLD: 2.9 M/UL — LOW (ref 4.2–5.8)
RBC # FLD: 17.9 % — HIGH (ref 10.3–14.5)
SODIUM SERPL-SCNC: 132 MMOL/L — LOW (ref 135–145)
WBC # BLD: 8.03 K/UL — SIGNIFICANT CHANGE UP (ref 3.8–10.5)
WBC # FLD AUTO: 8.03 K/UL — SIGNIFICANT CHANGE UP (ref 3.8–10.5)

## 2019-12-25 RX ADMIN — Medication 1 MILLIGRAM(S): at 12:24

## 2019-12-25 RX ADMIN — Medication 500 MILLIGRAM(S): at 12:24

## 2019-12-25 RX ADMIN — ALBUTEROL 2 PUFF(S): 90 AEROSOL, METERED ORAL at 17:07

## 2019-12-25 RX ADMIN — Medication 50 MILLIGRAM(S): at 22:36

## 2019-12-25 RX ADMIN — GABAPENTIN 300 MILLIGRAM(S): 400 CAPSULE ORAL at 22:36

## 2019-12-25 RX ADMIN — CLOPIDOGREL BISULFATE 75 MILLIGRAM(S): 75 TABLET, FILM COATED ORAL at 12:24

## 2019-12-25 RX ADMIN — PANTOPRAZOLE SODIUM 40 MILLIGRAM(S): 20 TABLET, DELAYED RELEASE ORAL at 06:36

## 2019-12-25 RX ADMIN — SEVELAMER CARBONATE 2400 MILLIGRAM(S): 2400 POWDER, FOR SUSPENSION ORAL at 12:24

## 2019-12-25 RX ADMIN — MIRTAZAPINE 7.5 MILLIGRAM(S): 45 TABLET, ORALLY DISINTEGRATING ORAL at 22:35

## 2019-12-25 RX ADMIN — HEPARIN SODIUM 5000 UNIT(S): 5000 INJECTION INTRAVENOUS; SUBCUTANEOUS at 13:16

## 2019-12-25 RX ADMIN — RISPERIDONE 1 MILLIGRAM(S): 4 TABLET ORAL at 06:36

## 2019-12-25 RX ADMIN — Medication 325 MILLIGRAM(S): at 12:24

## 2019-12-25 RX ADMIN — SIMVASTATIN 40 MILLIGRAM(S): 20 TABLET, FILM COATED ORAL at 22:35

## 2019-12-25 RX ADMIN — CARVEDILOL PHOSPHATE 6.25 MILLIGRAM(S): 80 CAPSULE, EXTENDED RELEASE ORAL at 22:35

## 2019-12-25 RX ADMIN — TRAMADOL HYDROCHLORIDE 25 MILLIGRAM(S): 50 TABLET ORAL at 22:36

## 2019-12-25 RX ADMIN — RISPERIDONE 1 MILLIGRAM(S): 4 TABLET ORAL at 17:07

## 2019-12-25 RX ADMIN — Medication 0.5 MILLIGRAM(S): at 17:12

## 2019-12-25 RX ADMIN — Medication 20 MILLIGRAM(S): at 12:24

## 2019-12-25 RX ADMIN — HEPARIN SODIUM 5000 UNIT(S): 5000 INJECTION INTRAVENOUS; SUBCUTANEOUS at 06:36

## 2019-12-25 RX ADMIN — ALBUTEROL 2 PUFF(S): 90 AEROSOL, METERED ORAL at 06:36

## 2019-12-25 RX ADMIN — SEVELAMER CARBONATE 2400 MILLIGRAM(S): 2400 POWDER, FOR SUSPENSION ORAL at 17:06

## 2019-12-25 RX ADMIN — Medication 50 MILLIGRAM(S): at 06:36

## 2019-12-25 RX ADMIN — Medication 0.5 MILLIGRAM(S): at 06:36

## 2019-12-25 RX ADMIN — SEVELAMER CARBONATE 2400 MILLIGRAM(S): 2400 POWDER, FOR SUSPENSION ORAL at 08:59

## 2019-12-25 RX ADMIN — Medication 80 MILLIGRAM(S): at 06:50

## 2019-12-25 RX ADMIN — ALBUTEROL 2 PUFF(S): 90 AEROSOL, METERED ORAL at 12:25

## 2019-12-25 RX ADMIN — HEPARIN SODIUM 5000 UNIT(S): 5000 INJECTION INTRAVENOUS; SUBCUTANEOUS at 22:36

## 2019-12-25 RX ADMIN — CARVEDILOL PHOSPHATE 6.25 MILLIGRAM(S): 80 CAPSULE, EXTENDED RELEASE ORAL at 06:36

## 2019-12-25 NOTE — PHYSICAL THERAPY INITIAL EVALUATION ADULT - ADDITIONAL COMMENTS
Pt lives with his parents in an apartment with elevator access. Pt amb independently prior to last hospital admission. Pt sent from a rehab center in [place].

## 2019-12-25 NOTE — PROGRESS NOTE ADULT - SUBJECTIVE AND OBJECTIVE BOX
PRESTON JOHNSON  39y Male  MRN:[id number]    Patient is a 39y old  Male who presents with a chief complaint of presyncope (24 Dec 2019 11:58)    HPI:  39 y/o M pt with multiple co morbid conditions including ESRD on hemodialysis 3 x weekly, COPD on home oxygen at baseline, obesity, HTN, DM2, migraine HA, schizophrenia and bipolar disorder, came for eval of AVF due to decreased thrill, then during ED stay RN who found patient slumped over in bed requiring sternal rub.  at time of evaluation. vitals stable. admitted for near syncope work up  Patient receives HD at [facility] via [facility], missed Sunday holiday scheduled HD (23 Dec 2019 13:24)      Patient seen and evaluated at bedside. No acute events overnight except as noted.    Interval HPI: no acute events o/n     PAST MEDICAL & SURGICAL HISTORY:  COPD (chronic obstructive pulmonary disease)  Migraine  HTN (hypertension)  DM (diabetes mellitus)  Bipolar disorder  Schizophrenia  ESRD on dialysis  Morbid obesity with BMI of 40.0-44.9, adult  H/O hernia repair      REVIEW OF SYSTEMS:  as per hpi    VITALS:  Vital Signs Last 24 Hrs  T(C): 36.8 (25 Dec 2019 11:16), Max: 36.9 (25 Dec 2019 05:18)  T(F): 98.3 (25 Dec 2019 11:16), Max: 98.5 (25 Dec 2019 05:18)  HR: 79 (25 Dec 2019 16:01) (76 - 82)  BP: 96/57 (25 Dec 2019 16:01) (74/58 - 127/54)  BP(mean): --  RR: 16 (25 Dec 2019 16:01) (16 - 18)  SpO2: 98% (25 Dec 2019 16:01) (95% - 98%)      PHYSICAL EXAM:  GENERAL: NAD, well-developed, obese  HEAD:  Atraumatic, Normocephalic  EYES: EOMI, PERRLA, conjunctiva and sclera clear  NECK: Supple, No JVD  CHEST/LUNG: Clear to auscultation bilaterally; No wheeze  HEART: S1, S2; No murmurs, rubs, or gallops  ABDOMEN: Soft, Nontender, Nondistended; Bowel sounds present  EXTREMITIES:  2+ Peripheral Pulses, No clubbing, cyanosis,+  edema  PSYCH: Normal affect     Consultant(s) Notes Reviewed:  [x ] YES  [ ] NO  Care Discussed with Consultants/Other Providers [ x] YES  [ ] NO    MEDS:  MEDICATIONS  (STANDING):  ALBUTerol    90 MICROgram(s) HFA Inhaler 2 Puff(s) Inhalation every 6 hours  ascorbic acid 500 milliGRAM(s) Oral daily  carvedilol 6.25 milliGRAM(s) Oral every 12 hours  chlorhexidine 4% Liquid 1 Application(s) Topical once  clonazePAM  Tablet 0.5 milliGRAM(s) Oral every 12 hours  clopidogrel Tablet 75 milliGRAM(s) Oral daily  dextrose 5%. 1000 milliLiter(s) (50 mL/Hr) IV Continuous <Continuous>  dextrose 50% Injectable 12.5 Gram(s) IV Push once  dextrose 50% Injectable 25 Gram(s) IV Push once  dextrose 50% Injectable 25 Gram(s) IV Push once  epoetin cyndie Injectable 84937 Unit(s) IV Push <User Schedule>  ferrous    sulfate 325 milliGRAM(s) Oral daily  folic acid 1 milliGRAM(s) Oral daily  furosemide    Tablet 80 milliGRAM(s) Oral daily  gabapentin 300 milliGRAM(s) Oral at bedtime  heparin  Injectable 5000 Unit(s) SubCutaneous every 8 hours  hydrALAZINE 50 milliGRAM(s) Oral three times a day  influenza   Vaccine 0.5 milliLiter(s) IntraMuscular once  insulin lispro (HumaLOG) corrective regimen sliding scale   SubCutaneous three times a day before meals  isosorbide   mononitrate ER Tablet (IMDUR) 30 milliGRAM(s) Oral daily  mirtazapine 7.5 milliGRAM(s) Oral at bedtime  pantoprazole    Tablet 40 milliGRAM(s) Oral before breakfast  PARoxetine 20 milliGRAM(s) Oral daily  risperiDONE   Tablet 1 milliGRAM(s) Oral every 12 hours  sevelamer carbonate 2400 milliGRAM(s) Oral three times a day with meals  simvastatin 40 milliGRAM(s) Oral at bedtime    MEDICATIONS  (PRN):  dextrose 40% Gel 15 Gram(s) Oral once PRN Blood Glucose LESS THAN 70 milliGRAM(s)/deciliter  glucagon  Injectable 1 milliGRAM(s) IntraMuscular once PRN Glucose LESS THAN 70 milligrams/deciliter  methocarbamol 500 milliGRAM(s) Oral every 12 hours PRN spasms  traMADol 25 milliGRAM(s) Oral every 8 hours PRN Severe Pain (7 - 10)      ALLERGIES:  aspirin (Swelling)  fish (Unknown)  penicillins (Swelling)  shellfish (Unknown)      LABS:                             8.2    8.03  )-----------( 408      ( 25 Dec 2019 08:44 )             28.7   12-25    132<L>  |  89<L>  |  50<H>  ----------------------------<  112<H>  5.2   |  27  |  8.87<H>    Ca    9.8      25 Dec 2019 06:03  Phos  5.5     12-24  Mg     2.4     12-24    TPro  7.0  /  Alb  3.3  /  TBili  0.4  /  DBili  0.2  /  AST  11  /  ALT  8<L>  /  AlkPhos  217<H>  12-24       < from: CT Head No Cont (12.23.19 @ 18:03) >    IMPRESSION: Unremarkable noncontrast CT of the brain. No change from 2/21/2019.        < end of copied text >

## 2019-12-25 NOTE — PROGRESS NOTE ADULT - SUBJECTIVE AND OBJECTIVE BOX
Subjective: Patient seen and examined. No new events except as noted.   s/p HD yesterday   feels weak     REVIEW OF SYSTEMS:    CONSTITUTIONAL: +weakness, fevers or chills  EYES/ENT: No visual changes;  No vertigo or throat pain   NECK: No pain or stiffness  RESPIRATORY: No cough, wheezing, hemoptysis; No shortness of breath  CARDIOVASCULAR: No chest pain or palpitations  GASTROINTESTINAL: No abdominal or epigastric pain. No nausea, vomiting, or hematemesis; No diarrhea or constipation. No melena or hematochezia.  GENITOURINARY: No dysuria, frequency or hematuria  NEUROLOGICAL: No numbness or weakness  SKIN: No itching, burning, rashes, or lesions   All other review of systems is negative unless indicated above.    MEDICATIONS:  MEDICATIONS  (STANDING):  ALBUTerol    90 MICROgram(s) HFA Inhaler 2 Puff(s) Inhalation every 6 hours  ascorbic acid 500 milliGRAM(s) Oral daily  carvedilol 6.25 milliGRAM(s) Oral every 12 hours  chlorhexidine 4% Liquid 1 Application(s) Topical once  clonazePAM  Tablet 0.5 milliGRAM(s) Oral every 12 hours  clopidogrel Tablet 75 milliGRAM(s) Oral daily  dextrose 5%. 1000 milliLiter(s) (50 mL/Hr) IV Continuous <Continuous>  dextrose 50% Injectable 12.5 Gram(s) IV Push once  dextrose 50% Injectable 25 Gram(s) IV Push once  dextrose 50% Injectable 25 Gram(s) IV Push once  epoetin cyndie Injectable 07796 Unit(s) IV Push <User Schedule>  ferrous    sulfate 325 milliGRAM(s) Oral daily  folic acid 1 milliGRAM(s) Oral daily  furosemide    Tablet 80 milliGRAM(s) Oral daily  gabapentin 300 milliGRAM(s) Oral at bedtime  heparin  Injectable 5000 Unit(s) SubCutaneous every 8 hours  hydrALAZINE 50 milliGRAM(s) Oral three times a day  influenza   Vaccine 0.5 milliLiter(s) IntraMuscular once  insulin lispro (HumaLOG) corrective regimen sliding scale   SubCutaneous three times a day before meals  isosorbide   mononitrate ER Tablet (IMDUR) 30 milliGRAM(s) Oral daily  mirtazapine 7.5 milliGRAM(s) Oral at bedtime  pantoprazole    Tablet 40 milliGRAM(s) Oral before breakfast  PARoxetine 20 milliGRAM(s) Oral daily  risperiDONE   Tablet 1 milliGRAM(s) Oral every 12 hours  sevelamer carbonate 2400 milliGRAM(s) Oral three times a day with meals  simvastatin 40 milliGRAM(s) Oral at bedtime      PHYSICAL EXAM:  T(C): 36.8 (12-25-19 @ 11:16), Max: 36.9 (12-24-19 @ 15:42)  HR: 82 (12-25-19 @ 11:19) (76 - 82)  BP: 90/54 (12-25-19 @ 11:44) (74/58 - 132/71)  RR: 18 (12-25-19 @ 11:16) (16 - 18)  SpO2: 95% (12-25-19 @ 11:19) (95% - 100%)  Wt(kg): --  I&O's Summary    24 Dec 2019 07:01  -  25 Dec 2019 07:00  --------------------------------------------------------  IN: 1165 mL / OUT: 5300 mL / NET: -4135 mL            Appearance: Morbidly obese   HEENT:   dry  oral mucosa, PERRL, EOMI	  Lymphatic: No lymphadenopathy  Cardiovascular: Normal S1 S2, No JVD,   Respiratory: +crackles   Psychiatry: A & O x 3, Mood & affect appropriate  Gastrointestinal:  Soft, Non-tender, + BS	  Skin: No rashes, No ecchymoses, No cyanosis	  Neurologic: Non-focal  Extremities: Normal range of motion, No clubbing, cyanosis or edema  Vascular: Peripheral pulses palpable 2+ bilaterally  LUE AVF +thrill       LABS:    CARDIAC MARKERS:                                8.2    8.03  )-----------( 408      ( 25 Dec 2019 08:44 )             28.7     12-25    132<L>  |  89<L>  |  50<H>  ----------------------------<  112<H>  5.2   |  27  |  8.87<H>    Ca    9.8      25 Dec 2019 06:03  Phos  5.5     12-24  Mg     2.4     12-24    TPro  7.0  /  Alb  3.3  /  TBili  0.4  /  DBili  0.2  /  AST  11  /  ALT  8<L>  /  AlkPhos  217<H>  12-24    proBNP:   Lipid Profile:   HgA1c:   TSH:             TELEMETRY: 	  SR  ECG:  	  RADIOLOGY:   DIAGNOSTIC TESTING:  [ ] Echocardiogram:  [ ]  Catheterization:  [ ] Stress Test:    OTHER:

## 2019-12-25 NOTE — PROGRESS NOTE ADULT - ASSESSMENT
ACute on chronic hypercapneic respiratory failure  Morbid obesity: r/o sleep apnea, obesity h ypoventilation  Acute on chronci HfPef  Pulmonary edme  ESRD with anasarca, fluid overload    REC    Continue aggressive fluid removal as tolerted  Patient refusing nasal CPAP  OBserve off abx  Continue duoneb qid

## 2019-12-25 NOTE — PROGRESS NOTE ADULT - SUBJECTIVE AND OBJECTIVE BOX
Follow-up Pulm Progress Note  Manuelito Mendez MD  [phone]    Afebrile, hemodynamically stable  OOB in chair sleeping/rousable  Refused nasal CPAP    Vital Signs Last 24 Hrs  T(C): 36.9 (25 Dec 2019 05:18), Max: 36.9 (24 Dec 2019 12:01)  T(F): 98.5 (25 Dec 2019 05:18), Max: 98.5 (24 Dec 2019 15:42)  HR: 79 (25 Dec 2019 09:42) (76 - 82)  BP: 127/54 (25 Dec 2019 09:42) (97/50 - 132/71)  BP(mean): --  RR: 18 (25 Dec 2019 05:18) (16 - 19)  SpO2: 98% (25 Dec 2019 09:42) (98% - 100%)    ABG - ( 24 Dec 2019 07:13 )  pH, Arterial: 7.32  pH, Blood: x     /  pCO2: 62    /  pO2: 165   / HCO3: 31    / Base Excess: 4.7   /  SaO2: 100                           8.2    8.03  )-----------( 408      ( 25 Dec 2019 08:44 )             28.7     12-25    132<L>  |  89<L>  |  50<H>  ----------------------------<  112<H>  5.2   |  27  |  8.87<H>    Ca    9.8      25 Dec 2019 06:03  Phos  5.5     12-24  Mg     2.4     12-24    TPro  7.0  /  Alb  3.3  /  TBili  0.4  /  DBili  0.2  /  AST  11  /  ALT  8<L>  /  AlkPhos  217<H>  12-24         PTT - ( 22 Dec 2019 18:14 )  PTT:33.7 sec    Physical Examination:  PULM: No wheeze or rhonchi  CVS: Regular rate and rhythm, no murmurs, rubs, or gallops  ABD: Soft, non-tender  EXT:  No clubbing, cyanosis, or edema    RADIOLOGY REVIEWED  CXR:    CT chest:    TTE:

## 2019-12-25 NOTE — PROGRESS NOTE ADULT - ASSESSMENT
11 yo male extensive pmhx as noted above including esrd on hd, copd on o2, obesity, htn, dm, migraine, schizo, bipolar, a/w presyncope and fluid overload

## 2019-12-25 NOTE — PHYSICAL THERAPY INITIAL EVALUATION ADULT - CRITERIA FOR SKILLED THERAPEUTIC INTERVENTIONS
functional limitations in following categories/risk reduction/prevention/rehab potential/predicted duration of therapy intervention/anticipated equipment needs at discharge/therapy frequency/anticipated discharge recommendation/impairments found

## 2019-12-25 NOTE — PHYSICAL THERAPY INITIAL EVALUATION ADULT - PERTINENT HX OF CURRENT PROBLEM, REHAB EVAL
39 yo male extensive pmhx as noted above including esrd on hd, copd on o2, obesity, htn, dm, migraine, schizo, bipolar, a/w presyncope and fluid overload

## 2019-12-26 LAB
ANION GAP SERPL CALC-SCNC: 16 MMOL/L — SIGNIFICANT CHANGE UP (ref 5–17)
BUN SERPL-MCNC: 57 MG/DL — HIGH (ref 7–23)
CALCIUM SERPL-MCNC: 9.8 MG/DL — SIGNIFICANT CHANGE UP (ref 8.4–10.5)
CHLORIDE SERPL-SCNC: 85 MMOL/L — LOW (ref 96–108)
CO2 SERPL-SCNC: 28 MMOL/L — SIGNIFICANT CHANGE UP (ref 22–31)
CREAT SERPL-MCNC: 10.61 MG/DL — HIGH (ref 0.5–1.3)
GLUCOSE BLDC GLUCOMTR-MCNC: 111 MG/DL — HIGH (ref 70–99)
GLUCOSE BLDC GLUCOMTR-MCNC: 131 MG/DL — HIGH (ref 70–99)
GLUCOSE SERPL-MCNC: 145 MG/DL — HIGH (ref 70–99)
HCT VFR BLD CALC: 28.4 % — LOW (ref 39–50)
HGB BLD-MCNC: 8.2 G/DL — LOW (ref 13–17)
MCHC RBC-ENTMCNC: 28.4 PG — SIGNIFICANT CHANGE UP (ref 27–34)
MCHC RBC-ENTMCNC: 28.9 GM/DL — LOW (ref 32–36)
MCV RBC AUTO: 98.3 FL — SIGNIFICANT CHANGE UP (ref 80–100)
PLATELET # BLD AUTO: 410 K/UL — HIGH (ref 150–400)
POTASSIUM SERPL-MCNC: 5.1 MMOL/L — SIGNIFICANT CHANGE UP (ref 3.5–5.3)
POTASSIUM SERPL-SCNC: 5.1 MMOL/L — SIGNIFICANT CHANGE UP (ref 3.5–5.3)
RBC # BLD: 2.89 M/UL — LOW (ref 4.2–5.8)
RBC # FLD: 18 % — HIGH (ref 10.3–14.5)
SODIUM SERPL-SCNC: 129 MMOL/L — LOW (ref 135–145)
WBC # BLD: 8.11 K/UL — SIGNIFICANT CHANGE UP (ref 3.8–10.5)
WBC # FLD AUTO: 8.11 K/UL — SIGNIFICANT CHANGE UP (ref 3.8–10.5)

## 2019-12-26 RX ORDER — HYDROMORPHONE HYDROCHLORIDE 2 MG/ML
2 INJECTION INTRAMUSCULAR; INTRAVENOUS; SUBCUTANEOUS ONCE
Refills: 0 | Status: DISCONTINUED | OUTPATIENT
Start: 2019-12-26 | End: 2019-12-26

## 2019-12-26 RX ORDER — OXYCODONE HYDROCHLORIDE 5 MG/1
5 TABLET ORAL ONCE
Refills: 0 | Status: DISCONTINUED | OUTPATIENT
Start: 2019-12-26 | End: 2019-12-26

## 2019-12-26 RX ORDER — HYDROMORPHONE HYDROCHLORIDE 2 MG/ML
0.5 INJECTION INTRAMUSCULAR; INTRAVENOUS; SUBCUTANEOUS ONCE
Refills: 0 | Status: DISCONTINUED | OUTPATIENT
Start: 2019-12-26 | End: 2019-12-26

## 2019-12-26 RX ADMIN — Medication 325 MILLIGRAM(S): at 12:02

## 2019-12-26 RX ADMIN — Medication 80 MILLIGRAM(S): at 07:08

## 2019-12-26 RX ADMIN — Medication 50 MILLIGRAM(S): at 23:56

## 2019-12-26 RX ADMIN — TRAMADOL HYDROCHLORIDE 25 MILLIGRAM(S): 50 TABLET ORAL at 00:40

## 2019-12-26 RX ADMIN — SEVELAMER CARBONATE 2400 MILLIGRAM(S): 2400 POWDER, FOR SUSPENSION ORAL at 12:04

## 2019-12-26 RX ADMIN — MIRTAZAPINE 7.5 MILLIGRAM(S): 45 TABLET, ORALLY DISINTEGRATING ORAL at 23:56

## 2019-12-26 RX ADMIN — CLOPIDOGREL BISULFATE 75 MILLIGRAM(S): 75 TABLET, FILM COATED ORAL at 12:02

## 2019-12-26 RX ADMIN — RISPERIDONE 1 MILLIGRAM(S): 4 TABLET ORAL at 07:08

## 2019-12-26 RX ADMIN — HEPARIN SODIUM 5000 UNIT(S): 5000 INJECTION INTRAVENOUS; SUBCUTANEOUS at 23:55

## 2019-12-26 RX ADMIN — ALBUTEROL 2 PUFF(S): 90 AEROSOL, METERED ORAL at 12:03

## 2019-12-26 RX ADMIN — HYDROMORPHONE HYDROCHLORIDE 2 MILLIGRAM(S): 2 INJECTION INTRAMUSCULAR; INTRAVENOUS; SUBCUTANEOUS at 05:00

## 2019-12-26 RX ADMIN — HEPARIN SODIUM 5000 UNIT(S): 5000 INJECTION INTRAVENOUS; SUBCUTANEOUS at 07:09

## 2019-12-26 RX ADMIN — Medication 20 MILLIGRAM(S): at 12:02

## 2019-12-26 RX ADMIN — ALBUTEROL 2 PUFF(S): 90 AEROSOL, METERED ORAL at 07:06

## 2019-12-26 RX ADMIN — HEPARIN SODIUM 5000 UNIT(S): 5000 INJECTION INTRAVENOUS; SUBCUTANEOUS at 13:29

## 2019-12-26 RX ADMIN — SEVELAMER CARBONATE 2400 MILLIGRAM(S): 2400 POWDER, FOR SUSPENSION ORAL at 08:52

## 2019-12-26 RX ADMIN — ISOSORBIDE MONONITRATE 30 MILLIGRAM(S): 60 TABLET, EXTENDED RELEASE ORAL at 12:02

## 2019-12-26 RX ADMIN — HYDROMORPHONE HYDROCHLORIDE 0.5 MILLIGRAM(S): 2 INJECTION INTRAMUSCULAR; INTRAVENOUS; SUBCUTANEOUS at 01:10

## 2019-12-26 RX ADMIN — Medication 1 MILLIGRAM(S): at 12:03

## 2019-12-26 RX ADMIN — ERYTHROPOIETIN 20000 UNIT(S): 10000 INJECTION, SOLUTION INTRAVENOUS; SUBCUTANEOUS at 18:27

## 2019-12-26 RX ADMIN — ALBUTEROL 2 PUFF(S): 90 AEROSOL, METERED ORAL at 00:41

## 2019-12-26 RX ADMIN — Medication 0.5 MILLIGRAM(S): at 07:08

## 2019-12-26 RX ADMIN — SEVELAMER CARBONATE 2400 MILLIGRAM(S): 2400 POWDER, FOR SUSPENSION ORAL at 23:56

## 2019-12-26 RX ADMIN — Medication 500 MILLIGRAM(S): at 12:03

## 2019-12-26 RX ADMIN — OXYCODONE HYDROCHLORIDE 5 MILLIGRAM(S): 5 TABLET ORAL at 23:55

## 2019-12-26 RX ADMIN — PANTOPRAZOLE SODIUM 40 MILLIGRAM(S): 20 TABLET, DELAYED RELEASE ORAL at 07:07

## 2019-12-26 RX ADMIN — SIMVASTATIN 40 MILLIGRAM(S): 20 TABLET, FILM COATED ORAL at 23:57

## 2019-12-26 RX ADMIN — HYDROMORPHONE HYDROCHLORIDE 0.5 MILLIGRAM(S): 2 INJECTION INTRAMUSCULAR; INTRAVENOUS; SUBCUTANEOUS at 00:40

## 2019-12-26 RX ADMIN — GABAPENTIN 300 MILLIGRAM(S): 400 CAPSULE ORAL at 23:57

## 2019-12-26 RX ADMIN — HYDROMORPHONE HYDROCHLORIDE 2 MILLIGRAM(S): 2 INJECTION INTRAMUSCULAR; INTRAVENOUS; SUBCUTANEOUS at 04:58

## 2019-12-26 RX ADMIN — CARVEDILOL PHOSPHATE 6.25 MILLIGRAM(S): 80 CAPSULE, EXTENDED RELEASE ORAL at 07:08

## 2019-12-26 RX ADMIN — Medication 50 MILLIGRAM(S): at 07:08

## 2019-12-26 RX ADMIN — Medication 50 MILLIGRAM(S): at 13:29

## 2019-12-26 NOTE — PROGRESS NOTE ADULT - SUBJECTIVE AND OBJECTIVE BOX
Follow-up Pulm Progress Note  Manuelito Mendez MD  [phone]    Afebrile, hemodynamically stable  Breathing comfortable supine on nasal oxygen      Vital Signs Last 24 Hrs  T(C): 36.9 (25 Dec 2019 05:18), Max: 36.9 (24 Dec 2019 12:01)  T(F): 98.5 (25 Dec 2019 05:18), Max: 98.5 (24 Dec 2019 15:42)  HR: 79 (25 Dec 2019 09:42) (76 - 82)  BP: 127/54 (25 Dec 2019 09:42) (97/50 - 132/71)  BP(mean): --  RR: 18 (25 Dec 2019 05:18) (16 - 19)  SpO2: 98% (25 Dec 2019 09:42) (98% - 100%)    ABG - ( 24 Dec 2019 07:13 )  pH, Arterial: 7.32  pH, Blood: x     /  pCO2: 62    /  pO2: 165   / HCO3: 31    / Base Excess: 4.7   /  SaO2: 100                           8.2    8.03  )-----------( 408      ( 25 Dec 2019 08:44 )             28.7     12-25    132<L>  |  89<L>  |  50<H>  ----------------------------<  112<H>  5.2   |  27  |  8.87<H>    Ca    9.8      25 Dec 2019 06:03  Phos  5.5     12-24  Mg     2.4     12-24    TPro  7.0  /  Alb  3.3  /  TBili  0.4  /  DBili  0.2  /  AST  11  /  ALT  8<L>  /  AlkPhos  217<H>  12-24         PTT - ( 22 Dec 2019 18:14 )  PTT:33.7 sec    Physical Examination:  PULM: No wheeze or rhonchi  CVS: Regular rate and rhythm, no murmurs, rubs, or gallops  ABD: Soft, non-tender  EXT:  No clubbing, cyanosis, or edema    RADIOLOGY REVIEWED  CXR:    CT chest:    TTE:

## 2019-12-26 NOTE — PROGRESS NOTE ADULT - ASSESSMENT
41 y/o M pt with multiple co morbid conditions including ESRD on hemodialysis 3 x weekly, COPD on home oxygen at baseline, obesity, HTN, DM2, migraine HA, schizophrenia and bipolar disorder, came for eval of AVF due to decreased thrill, then during ED stay RN who found patient slumped over in bed requiring sternal rub.  at time of evaluation. vitals stable. admitted for near syncope work up  Patient receives HD at [facility] via [facility] MWF, missed Chidi holiday scheduled HD

## 2019-12-26 NOTE — PROGRESS NOTE ADULT - SUBJECTIVE AND OBJECTIVE BOX
Subjective: Patient seen and examined. No new events except as noted.     REVIEW OF SYSTEMS:    CONSTITUTIONAL:+ weakness, fevers or chills  EYES/ENT: No visual changes;  No vertigo or throat pain   NECK: No pain or stiffness  RESPIRATORY: No cough, wheezing, hemoptysis; No shortness of breath  CARDIOVASCULAR: No chest pain or palpitations  GASTROINTESTINAL: No abdominal or epigastric pain. No nausea, vomiting, or hematemesis; No diarrhea or constipation. No melena or hematochezia.  GENITOURINARY: No dysuria, frequency or hematuria  NEUROLOGICAL: No numbness or weakness  SKIN: No itching, burning, rashes, or lesions   All other review of systems is negative unless indicated above.    MEDICATIONS:  MEDICATIONS  (STANDING):  ALBUTerol    90 MICROgram(s) HFA Inhaler 2 Puff(s) Inhalation every 6 hours  ascorbic acid 500 milliGRAM(s) Oral daily  carvedilol 6.25 milliGRAM(s) Oral every 12 hours  chlorhexidine 4% Liquid 1 Application(s) Topical once  clonazePAM  Tablet 0.5 milliGRAM(s) Oral every 12 hours  clopidogrel Tablet 75 milliGRAM(s) Oral daily  dextrose 5%. 1000 milliLiter(s) (50 mL/Hr) IV Continuous <Continuous>  dextrose 50% Injectable 12.5 Gram(s) IV Push once  dextrose 50% Injectable 25 Gram(s) IV Push once  dextrose 50% Injectable 25 Gram(s) IV Push once  epoetin cyndie Injectable 95373 Unit(s) IV Push <User Schedule>  ferrous    sulfate 325 milliGRAM(s) Oral daily  folic acid 1 milliGRAM(s) Oral daily  furosemide    Tablet 80 milliGRAM(s) Oral daily  gabapentin 300 milliGRAM(s) Oral at bedtime  heparin  Injectable 5000 Unit(s) SubCutaneous every 8 hours  hydrALAZINE 50 milliGRAM(s) Oral three times a day  influenza   Vaccine 0.5 milliLiter(s) IntraMuscular once  insulin lispro (HumaLOG) corrective regimen sliding scale   SubCutaneous three times a day before meals  isosorbide   mononitrate ER Tablet (IMDUR) 30 milliGRAM(s) Oral daily  mirtazapine 7.5 milliGRAM(s) Oral at bedtime  pantoprazole    Tablet 40 milliGRAM(s) Oral before breakfast  PARoxetine 20 milliGRAM(s) Oral daily  risperiDONE   Tablet 1 milliGRAM(s) Oral every 12 hours  sevelamer carbonate 2400 milliGRAM(s) Oral three times a day with meals  simvastatin 40 milliGRAM(s) Oral at bedtime      PHYSICAL EXAM:  T(C): 36.6 (12-26-19 @ 11:04), Max: 36.8 (12-25-19 @ 11:16)  HR: 77 (12-26-19 @ 11:04) (73 - 83)  BP: 107/61 (12-26-19 @ 11:04) (74/58 - 121/57)  RR: 18 (12-26-19 @ 11:04) (16 - 19)  SpO2: 97% (12-26-19 @ 11:04) (95% - 99%)  Wt(kg): --  I&O's Summary    25 Dec 2019 07:01  -  26 Dec 2019 07:00  --------------------------------------------------------  IN: 930 mL / OUT: 0 mL / NET: 930 mL    26 Dec 2019 07:01  -  26 Dec 2019 11:12  --------------------------------------------------------  IN: 240 mL / OUT: 100 mL / NET: 140 mL          Appearance: NAD morbidly obese   HEENT:   Normal oral mucosa, PERRL, EOMI	  Lymphatic: No lymphadenopathy , no edema  Cardiovascular: Normal S1 S2, No JVD, No murmurs , Peripheral pulses palpable 2+ bilaterally  Respiratory: Lungs clear to auscultation, normal effort 	  Gastrointestinal:  Soft, Non-tender, + BS	  Skin: No rashes, No ecchymoses, No cyanosis, warm to touch  Musculoskeletal: Normal range of motion, normal strength  Psychiatry:  Mood & affect appropriate  Ext: No edema. LUE AV fistula + thrill       LABS:    CARDIAC MARKERS:                                8.2    8.03  )-----------( 408      ( 25 Dec 2019 08:44 )             28.7     12-25    132<L>  |  89<L>  |  50<H>  ----------------------------<  112<H>  5.2   |  27  |  8.87<H>    Ca    9.8      25 Dec 2019 06:03      proBNP:   Lipid Profile:   HgA1c:   TSH:             TELEMETRY: 	 SR   ECG:  	  RADIOLOGY:   DIAGNOSTIC TESTING:  [ ] Echocardiogram:  [ ]  Catheterization:  [ ] Stress Test:    OTHER:

## 2019-12-26 NOTE — PROGRESS NOTE ADULT - ASSESSMENT
9 yo male extensive pmhx as noted above including esrd on hd, copd on o2, obesity, htn, dm, migraine, schizo, bipolar, a/w presyncope and fluid overload

## 2019-12-26 NOTE — PROGRESS NOTE ADULT - ASSESSMENT
41 yo male extensive pmhx as noted above including esrd on hd, copd on o2, obesity, htn, dm, migraine, schizo, bipolar, a/w presyncope and fluid overload    fluid overload  renal consulted  plan for HD as per renal today    presyncope   CT head noted   check echo  cards consulted    DM  insulin as per endo    copd/alice  on home o2  pulm consulted    cont all prior home meds    dvt ppx    PT  dc planning rehab        Advanced care planning was discussed with patient and family.  Advanced care planning forms were reviewed and discussed.  Differential diagnosis and plan of care discussed with patient after the evaluation.   Pain assessed and judicious use of narcotics when appropriate was discussed.  Importance of Fall prevention discussed.  Counseling on Smoking and Alcohol cessation was offered when appropriate.  Counseling on Diet, exercise, and medication compliance was done.

## 2019-12-26 NOTE — PROGRESS NOTE ADULT - ASSESSMENT
ACute on chronic hypercapneic respiratory failure  Morbid obesity: r/o sleep apnea, obesity h ypoventilation  Acute on chronci HfPef  Pulmonary edme  ESRD with anasarca, fluid overload    REC    Continue aggressive fluid removal as tolerted  OBserve off abx  Continue duoneb qid

## 2019-12-26 NOTE — PROGRESS NOTE ADULT - PROBLEM SELECTOR PLAN 1
Scheduled for HD today 5hrs 6kg UF on 12/26/19 and possible UF tomorrow if no plan for DC that day  Dose meds for ESRD  Daily BMP

## 2019-12-26 NOTE — PROGRESS NOTE ADULT - SUBJECTIVE AND OBJECTIVE BOX
PRESTON JOHNSON  39y Male  MRN:[id number]    Patient is a 39y old  Male who presents with a chief complaint of presyncope (24 Dec 2019 11:58)    HPI:  41 y/o M pt with multiple co morbid conditions including ESRD on hemodialysis 3 x weekly, COPD on home oxygen at baseline, obesity, HTN, DM2, migraine HA, schizophrenia and bipolar disorder, came for eval of AVF due to decreased thrill, then during ED stay RN who found patient slumped over in bed requiring sternal rub.  at time of evaluation. vitals stable. admitted for near syncope work up  [facility] [place] [facility], missed Sunday holiday scheduled HD (23 Dec 2019 13:24)      Patient seen and evaluated at bedside. No acute events overnight except as noted.    Interval HPI: no acute events o/n     PAST MEDICAL & SURGICAL HISTORY:  COPD (chronic obstructive pulmonary disease)  Migraine  HTN (hypertension)  DM (diabetes mellitus)  Bipolar disorder  Schizophrenia  ESRD on dialysis  Morbid obesity with BMI of 40.0-44.9, adult  H/O hernia repair      REVIEW OF SYSTEMS:  as per hpi    VITALS:  Vital Signs Last 24 Hrs  T(C): 36.6 (26 Dec 2019 11:04), Max: 36.8 (26 Dec 2019 04:31)  T(F): 97.9 (26 Dec 2019 11:04), Max: 98.2 (26 Dec 2019 04:31)  HR: 77 (26 Dec 2019 12:25) (73 - 83)  BP: 107/61 (26 Dec 2019 11:04) (86/52 - 121/57)  BP(mean): --  RR: 18 (26 Dec 2019 11:04) (16 - 19)  SpO2: 97% (26 Dec 2019 12:25) (96% - 99%)      PHYSICAL EXAM:  GENERAL: NAD, well-developed, obese  HEAD:  Atraumatic, Normocephalic  EYES: EOMI, PERRLA, conjunctiva and sclera clear  NECK: Supple, No JVD  CHEST/LUNG: Clear to auscultation bilaterally; No wheeze  HEART: S1, S2; No murmurs, rubs, or gallops  ABDOMEN: Soft, Nontender, Nondistended; Bowel sounds present  EXTREMITIES:  2+ Peripheral Pulses, No clubbing, cyanosis,+  edema  PSYCH: Normal affect     Consultant(s) Notes Reviewed:  [x ] YES  [ ] NO  Care Discussed with Consultants/Other Providers [ x] YES  [ ] NO    MEDS:  MEDICATIONS  (STANDING):  ALBUTerol    90 MICROgram(s) HFA Inhaler 2 Puff(s) Inhalation every 6 hours  ascorbic acid 500 milliGRAM(s) Oral daily  carvedilol 6.25 milliGRAM(s) Oral every 12 hours  chlorhexidine 4% Liquid 1 Application(s) Topical once  clonazePAM  Tablet 0.5 milliGRAM(s) Oral every 12 hours  clopidogrel Tablet 75 milliGRAM(s) Oral daily  dextrose 5%. 1000 milliLiter(s) (50 mL/Hr) IV Continuous <Continuous>  dextrose 50% Injectable 12.5 Gram(s) IV Push once  dextrose 50% Injectable 25 Gram(s) IV Push once  dextrose 50% Injectable 25 Gram(s) IV Push once  epoetin cyndie Injectable 42978 Unit(s) IV Push <User Schedule>  ferrous    sulfate 325 milliGRAM(s) Oral daily  folic acid 1 milliGRAM(s) Oral daily  furosemide    Tablet 80 milliGRAM(s) Oral daily  gabapentin 300 milliGRAM(s) Oral at bedtime  heparin  Injectable 5000 Unit(s) SubCutaneous every 8 hours  hydrALAZINE 50 milliGRAM(s) Oral three times a day  influenza   Vaccine 0.5 milliLiter(s) IntraMuscular once  insulin lispro (HumaLOG) corrective regimen sliding scale   SubCutaneous three times a day before meals  isosorbide   mononitrate ER Tablet (IMDUR) 30 milliGRAM(s) Oral daily  mirtazapine 7.5 milliGRAM(s) Oral at bedtime  pantoprazole    Tablet 40 milliGRAM(s) Oral before breakfast  PARoxetine 20 milliGRAM(s) Oral daily  risperiDONE   Tablet 1 milliGRAM(s) Oral every 12 hours  sevelamer carbonate 2400 milliGRAM(s) Oral three times a day with meals  simvastatin 40 milliGRAM(s) Oral at bedtime    MEDICATIONS  (PRN):  dextrose 40% Gel 15 Gram(s) Oral once PRN Blood Glucose LESS THAN 70 milliGRAM(s)/deciliter  glucagon  Injectable 1 milliGRAM(s) IntraMuscular once PRN Glucose LESS THAN 70 milligrams/deciliter  methocarbamol 500 milliGRAM(s) Oral every 12 hours PRN spasms  traMADol 25 milliGRAM(s) Oral every 8 hours PRN Severe Pain (7 - 10)        ALLERGIES:  aspirin (Swelling)  fish (Unknown)  penicillins (Swelling)  shellfish (Unknown)      LABS:                                   8.2    8.03  )-----------( 408      ( 25 Dec 2019 08:44 )             28.7   12-25    132<L>  |  89<L>  |  50<H>  ----------------------------<  112<H>  5.2   |  27  |  8.87<H>    Ca    9.8      25 Dec 2019 06:03         < from: CT Head No Cont (12.23.19 @ 18:03) >    IMPRESSION: Unremarkable noncontrast CT of the brain. No change from 2/21/2019.        < end of copied text >

## 2019-12-26 NOTE — PROGRESS NOTE ADULT - SUBJECTIVE AND OBJECTIVE BOX
[facility] - HUSSAIN Sanchez / HUSSAIN Villalobos / ERNESTO Mehta/ HUSSAIN Mauricio/ HUSSAIN Barber/ IVONNE Hui / JADEN La / BRANDON Alexander  ---------------------------------------------------------------------------------------------------------------  seen and examined today for ESRD on HD  Interval : due for HD today  VITALS:  T(F): 98.2 (12-26-19 @ 04:31), Max: 98.3 (12-25-19 @ 11:16)  HR: 81 (12-26-19 @ 07:04)  BP: 116/66 (12-26-19 @ 07:04)  RR: 19 (12-26-19 @ 04:31)  SpO2: 97% (12-26-19 @ 06:28)  Wt(kg): --    12-25 @ 07:01  -  12-26 @ 07:00  --------------------------------------------------------  IN: 930 mL / OUT: 0 mL / NET: 930 mL    12-26 @ 07:01  -  12-26 @ 10:42  --------------------------------------------------------  IN: 240 mL / OUT: 100 mL / NET: 140 mL      Physical Exam :-  Constitutional: NAD  Neck: Supple.  Respiratory: Bilateral equal breath sounds,  Cardiovascular: S1, S2 normal,  Gastrointestinal: Bowel Sounds present, soft, non tender.  Extremities: 3+ edema  Neurological: Alert and Oriented x 3, no focal deficits  Psychiatric: Normal mood, normal affect  Data:-  Allergies :   aspirin (Swelling)  fish (Unknown)  penicillins (Swelling)  shellfish (Unknown)    Hospital Medications:   MEDICATIONS  (STANDING):  ALBUTerol    90 MICROgram(s) HFA Inhaler 2 Puff(s) Inhalation every 6 hours  ascorbic acid 500 milliGRAM(s) Oral daily  carvedilol 6.25 milliGRAM(s) Oral every 12 hours  chlorhexidine 4% Liquid 1 Application(s) Topical once  clonazePAM  Tablet 0.5 milliGRAM(s) Oral every 12 hours  clopidogrel Tablet 75 milliGRAM(s) Oral daily  dextrose 5%. 1000 milliLiter(s) (50 mL/Hr) IV Continuous <Continuous>  dextrose 50% Injectable 12.5 Gram(s) IV Push once  dextrose 50% Injectable 25 Gram(s) IV Push once  dextrose 50% Injectable 25 Gram(s) IV Push once  epoetin cyndie Injectable 68690 Unit(s) IV Push <User Schedule>  ferrous    sulfate 325 milliGRAM(s) Oral daily  folic acid 1 milliGRAM(s) Oral daily  furosemide    Tablet 80 milliGRAM(s) Oral daily  gabapentin 300 milliGRAM(s) Oral at bedtime  heparin  Injectable 5000 Unit(s) SubCutaneous every 8 hours  hydrALAZINE 50 milliGRAM(s) Oral three times a day  influenza   Vaccine 0.5 milliLiter(s) IntraMuscular once  insulin lispro (HumaLOG) corrective regimen sliding scale   SubCutaneous three times a day before meals  isosorbide   mononitrate ER Tablet (IMDUR) 30 milliGRAM(s) Oral daily  mirtazapine 7.5 milliGRAM(s) Oral at bedtime  pantoprazole    Tablet 40 milliGRAM(s) Oral before breakfast  PARoxetine 20 milliGRAM(s) Oral daily  risperiDONE   Tablet 1 milliGRAM(s) Oral every 12 hours  sevelamer carbonate 2400 milliGRAM(s) Oral three times a day with meals  simvastatin 40 milliGRAM(s) Oral at bedtime    12-25    132<L>  |  89<L>  |  50<H>  ----------------------------<  112<H>  5.2   |  27  |  8.87<H>    Ca    9.8      25 Dec 2019 06:03      Creatinine Trend: 8.87 <--, 8.01 <--, 10.97 <--, 10.28 <--                        8.2    8.03  )-----------( 408      ( 25 Dec 2019 08:44 )             28.7

## 2019-12-27 ENCOUNTER — TRANSCRIPTION ENCOUNTER (OUTPATIENT)
Age: 39
End: 2019-12-27

## 2019-12-27 LAB
GAS PNL BLDA: SIGNIFICANT CHANGE UP
GLUCOSE BLDC GLUCOMTR-MCNC: 104 MG/DL — HIGH (ref 70–99)
GLUCOSE BLDC GLUCOMTR-MCNC: 106 MG/DL — HIGH (ref 70–99)
GLUCOSE BLDC GLUCOMTR-MCNC: 110 MG/DL — HIGH (ref 70–99)
GLUCOSE BLDC GLUCOMTR-MCNC: 114 MG/DL — HIGH (ref 70–99)
GLUCOSE BLDC GLUCOMTR-MCNC: 117 MG/DL — HIGH (ref 70–99)

## 2019-12-27 RX ORDER — FUROSEMIDE 40 MG
1 TABLET ORAL
Qty: 0 | Refills: 0 | DISCHARGE

## 2019-12-27 RX ORDER — PANTOPRAZOLE SODIUM 20 MG/1
1 TABLET, DELAYED RELEASE ORAL
Qty: 0 | Refills: 0 | DISCHARGE
Start: 2019-12-27

## 2019-12-27 RX ORDER — ASCORBIC ACID 60 MG
1 TABLET,CHEWABLE ORAL
Qty: 0 | Refills: 0 | DISCHARGE
Start: 2019-12-27

## 2019-12-27 RX ORDER — HYDRALAZINE HCL 50 MG
1 TABLET ORAL
Qty: 0 | Refills: 0 | DISCHARGE
Start: 2019-12-27

## 2019-12-27 RX ORDER — AMLODIPINE BESYLATE 2.5 MG/1
1 TABLET ORAL
Qty: 0 | Refills: 0 | DISCHARGE

## 2019-12-27 RX ORDER — CARVEDILOL PHOSPHATE 80 MG/1
1 CAPSULE, EXTENDED RELEASE ORAL
Qty: 0 | Refills: 0 | DISCHARGE
Start: 2019-12-27

## 2019-12-27 RX ORDER — BUMETANIDE 0.25 MG/ML
1 INJECTION INTRAMUSCULAR; INTRAVENOUS
Qty: 0 | Refills: 0 | DISCHARGE

## 2019-12-27 RX ORDER — CLONAZEPAM 1 MG
1 TABLET ORAL
Qty: 0 | Refills: 0 | DISCHARGE
Start: 2019-12-27

## 2019-12-27 RX ORDER — TRAMADOL HYDROCHLORIDE 50 MG/1
0.5 TABLET ORAL
Qty: 0 | Refills: 0 | DISCHARGE
Start: 2019-12-27

## 2019-12-27 RX ORDER — GABAPENTIN 400 MG/1
1 CAPSULE ORAL
Qty: 0 | Refills: 0 | DISCHARGE
Start: 2019-12-27

## 2019-12-27 RX ORDER — ISOSORBIDE MONONITRATE 60 MG/1
1 TABLET, EXTENDED RELEASE ORAL
Qty: 0 | Refills: 0 | DISCHARGE
Start: 2019-12-27

## 2019-12-27 RX ORDER — SIMVASTATIN 20 MG/1
1 TABLET, FILM COATED ORAL
Qty: 0 | Refills: 0 | DISCHARGE
Start: 2019-12-27

## 2019-12-27 RX ORDER — SEVELAMER CARBONATE 2400 MG/1
1 POWDER, FOR SUSPENSION ORAL
Qty: 0 | Refills: 0 | DISCHARGE

## 2019-12-27 RX ORDER — CLOPIDOGREL BISULFATE 75 MG/1
1 TABLET, FILM COATED ORAL
Qty: 0 | Refills: 0 | DISCHARGE
Start: 2019-12-27

## 2019-12-27 RX ORDER — HYDROMORPHONE HYDROCHLORIDE 2 MG/ML
0.5 INJECTION INTRAMUSCULAR; INTRAVENOUS; SUBCUTANEOUS ONCE
Refills: 0 | Status: DISCONTINUED | OUTPATIENT
Start: 2019-12-27 | End: 2019-12-27

## 2019-12-27 RX ORDER — FOLIC ACID 0.8 MG
1 TABLET ORAL
Qty: 0 | Refills: 0 | DISCHARGE
Start: 2019-12-27

## 2019-12-27 RX ORDER — CLOPIDOGREL BISULFATE 75 MG/1
1 TABLET, FILM COATED ORAL
Qty: 0 | Refills: 0 | DISCHARGE

## 2019-12-27 RX ORDER — HYDRALAZINE HCL 50 MG
1 TABLET ORAL
Qty: 0 | Refills: 0 | DISCHARGE

## 2019-12-27 RX ORDER — CARVEDILOL PHOSPHATE 80 MG/1
1 CAPSULE, EXTENDED RELEASE ORAL
Qty: 60 | Refills: 0

## 2019-12-27 RX ORDER — FUROSEMIDE 40 MG
1 TABLET ORAL
Qty: 0 | Refills: 0 | DISCHARGE
Start: 2019-12-27

## 2019-12-27 RX ORDER — SEVELAMER CARBONATE 2400 MG/1
3 POWDER, FOR SUSPENSION ORAL
Qty: 0 | Refills: 0 | DISCHARGE
Start: 2019-12-27

## 2019-12-27 RX ADMIN — Medication 50 MILLIGRAM(S): at 07:45

## 2019-12-27 RX ADMIN — HEPARIN SODIUM 5000 UNIT(S): 5000 INJECTION INTRAVENOUS; SUBCUTANEOUS at 14:14

## 2019-12-27 RX ADMIN — CARVEDILOL PHOSPHATE 6.25 MILLIGRAM(S): 80 CAPSULE, EXTENDED RELEASE ORAL at 00:02

## 2019-12-27 RX ADMIN — ALBUTEROL 2 PUFF(S): 90 AEROSOL, METERED ORAL at 17:54

## 2019-12-27 RX ADMIN — ALBUTEROL 2 PUFF(S): 90 AEROSOL, METERED ORAL at 07:47

## 2019-12-27 RX ADMIN — CARVEDILOL PHOSPHATE 6.25 MILLIGRAM(S): 80 CAPSULE, EXTENDED RELEASE ORAL at 07:45

## 2019-12-27 RX ADMIN — HYDROMORPHONE HYDROCHLORIDE 0.5 MILLIGRAM(S): 2 INJECTION INTRAMUSCULAR; INTRAVENOUS; SUBCUTANEOUS at 22:50

## 2019-12-27 RX ADMIN — TRAMADOL HYDROCHLORIDE 25 MILLIGRAM(S): 50 TABLET ORAL at 18:30

## 2019-12-27 RX ADMIN — Medication 500 MILLIGRAM(S): at 14:12

## 2019-12-27 RX ADMIN — SEVELAMER CARBONATE 2400 MILLIGRAM(S): 2400 POWDER, FOR SUSPENSION ORAL at 17:53

## 2019-12-27 RX ADMIN — TRAMADOL HYDROCHLORIDE 25 MILLIGRAM(S): 50 TABLET ORAL at 17:52

## 2019-12-27 RX ADMIN — RISPERIDONE 1 MILLIGRAM(S): 4 TABLET ORAL at 07:44

## 2019-12-27 RX ADMIN — CARVEDILOL PHOSPHATE 6.25 MILLIGRAM(S): 80 CAPSULE, EXTENDED RELEASE ORAL at 17:53

## 2019-12-27 RX ADMIN — Medication 325 MILLIGRAM(S): at 14:12

## 2019-12-27 RX ADMIN — RISPERIDONE 1 MILLIGRAM(S): 4 TABLET ORAL at 00:02

## 2019-12-27 RX ADMIN — HEPARIN SODIUM 5000 UNIT(S): 5000 INJECTION INTRAVENOUS; SUBCUTANEOUS at 23:05

## 2019-12-27 RX ADMIN — SIMVASTATIN 40 MILLIGRAM(S): 20 TABLET, FILM COATED ORAL at 23:05

## 2019-12-27 RX ADMIN — ISOSORBIDE MONONITRATE 30 MILLIGRAM(S): 60 TABLET, EXTENDED RELEASE ORAL at 14:12

## 2019-12-27 RX ADMIN — CLOPIDOGREL BISULFATE 75 MILLIGRAM(S): 75 TABLET, FILM COATED ORAL at 14:12

## 2019-12-27 RX ADMIN — ALBUTEROL 2 PUFF(S): 90 AEROSOL, METERED ORAL at 23:09

## 2019-12-27 RX ADMIN — RISPERIDONE 1 MILLIGRAM(S): 4 TABLET ORAL at 17:53

## 2019-12-27 RX ADMIN — MIRTAZAPINE 7.5 MILLIGRAM(S): 45 TABLET, ORALLY DISINTEGRATING ORAL at 23:05

## 2019-12-27 RX ADMIN — SEVELAMER CARBONATE 2400 MILLIGRAM(S): 2400 POWDER, FOR SUSPENSION ORAL at 08:38

## 2019-12-27 RX ADMIN — PANTOPRAZOLE SODIUM 40 MILLIGRAM(S): 20 TABLET, DELAYED RELEASE ORAL at 07:45

## 2019-12-27 RX ADMIN — Medication 0.5 MILLIGRAM(S): at 07:46

## 2019-12-27 RX ADMIN — METHOCARBAMOL 500 MILLIGRAM(S): 500 TABLET, FILM COATED ORAL at 14:16

## 2019-12-27 RX ADMIN — Medication 20 MILLIGRAM(S): at 14:13

## 2019-12-27 RX ADMIN — ALBUTEROL 2 PUFF(S): 90 AEROSOL, METERED ORAL at 00:03

## 2019-12-27 RX ADMIN — Medication 0.5 MILLIGRAM(S): at 00:01

## 2019-12-27 RX ADMIN — OXYCODONE HYDROCHLORIDE 5 MILLIGRAM(S): 5 TABLET ORAL at 00:25

## 2019-12-27 RX ADMIN — Medication 1 MILLIGRAM(S): at 14:12

## 2019-12-27 RX ADMIN — Medication 50 MILLIGRAM(S): at 14:13

## 2019-12-27 RX ADMIN — GABAPENTIN 300 MILLIGRAM(S): 400 CAPSULE ORAL at 23:05

## 2019-12-27 RX ADMIN — Medication 80 MILLIGRAM(S): at 07:45

## 2019-12-27 RX ADMIN — Medication 0.5 MILLIGRAM(S): at 17:52

## 2019-12-27 RX ADMIN — HYDROMORPHONE HYDROCHLORIDE 0.5 MILLIGRAM(S): 2 INJECTION INTRAMUSCULAR; INTRAVENOUS; SUBCUTANEOUS at 19:50

## 2019-12-27 RX ADMIN — HEPARIN SODIUM 5000 UNIT(S): 5000 INJECTION INTRAVENOUS; SUBCUTANEOUS at 07:46

## 2019-12-27 NOTE — DISCHARGE NOTE PROVIDER - HOSPITAL COURSE
41 y/o M with multiple co morbid conditions including ESRD on hemodialysis M/W/F, COPD on home oxygen at baseline, obesity, HTN, DM2, migraine HA, schizophrenia and bipolar disorder, came for eval of AVF due to decreased thrill, then during ED stay RN who found patient slumped over in bed requiring sternal rub.  at time of evaluation. Vitals stable. admitted for near syncope work up. CT Head negative.     Patient receives HD at [facility] via [facility] MWF, missed Sunday holiday scheduled HD. 41 y/o M with multiple co morbid conditions including ESRD on hemodialysis M/W/F, COPD on home oxygen at baseline, obesity, HTN, DM2, migraine HA, schizophrenia and bipolar disorder, came for eval of AVF due to decreased thrill, then during ED stay RN who found patient slumped over in bed requiring sternal rub.  at time of evaluation. Vitals stable. admitted for near syncope work up. CT Head negative.     Patient receives HD at [facility] via [facility] MWF, missed Sunday holiday scheduled HD.     ABG showed respiratory acidosis, treated with BiPap. HD with aggressive fluid removal performed with improvement.         Pt is medically stable for discharge back to [facility] with HD.

## 2019-12-27 NOTE — DISCHARGE NOTE PROVIDER - NSDCFUSCHEDAPPT_GEN_ALL_CORE_FT
PRESTON JOHNSON ; 01/03/2020 ; [facility] [address] [address] [address]  PRESTON JOHNSON ; 01/03/2020 ; [facility] [address] [address] PRESTON JOHNSON ; 01/07/2020 ; [facility] [address]

## 2019-12-27 NOTE — PROGRESS NOTE ADULT - SUBJECTIVE AND OBJECTIVE BOX
[facility] - HUSSAIN Sanchez / HUSSAIN Villalobos / ERNESTO Mehta/ HUSSAIN Mauricio/ HUSSAIN Barber/ IVONNE Hui / JADEN La / BRANDON Alexander  ---------------------------------------------------------------------------------------------------------------  seen and examined today for ESRD on HD  Interval : due for DC today  VITALS:  T(F): 97.3 (12-27-19 @ 11:15), Max: 98.7 (12-27-19 @ 04:14)  HR: 81 (12-27-19 @ 11:15)  BP: 116/66 (12-27-19 @ 11:15)  RR: 18 (12-27-19 @ 11:15)  SpO2: 98% (12-27-19 @ 11:15)  Wt(kg): --    12-26 @ 07:01  -  12-27 @ 07:00  --------------------------------------------------------  IN: 720 mL / OUT: 6100 mL / NET: -5380 mL    12-27 @ 07:01  -  12-27 @ 13:03  --------------------------------------------------------  IN: 360 mL / OUT: 0 mL / NET: 360 mL      Physical Exam :-  Constitutional: NAD  Neck: Supple.  Respiratory: Bilateral equal breath sounds,  Cardiovascular: S1, S2 normal,  Gastrointestinal: Bowel Sounds present, soft, non tender.  Extremities: 3+edema  Neurological: Alert and Oriented x 3, no focal deficits  Psychiatric: Normal mood, normal affect  Data:-  Allergies :   aspirin (Swelling)  fish (Unknown)  penicillins (Swelling)  shellfish (Unknown)    Hospital Medications:   MEDICATIONS  (STANDING):  ALBUTerol    90 MICROgram(s) HFA Inhaler 2 Puff(s) Inhalation every 6 hours  ascorbic acid 500 milliGRAM(s) Oral daily  carvedilol 6.25 milliGRAM(s) Oral every 12 hours  chlorhexidine 4% Liquid 1 Application(s) Topical once  clonazePAM  Tablet 0.5 milliGRAM(s) Oral every 12 hours  clopidogrel Tablet 75 milliGRAM(s) Oral daily  dextrose 5%. 1000 milliLiter(s) (50 mL/Hr) IV Continuous <Continuous>  dextrose 50% Injectable 12.5 Gram(s) IV Push once  dextrose 50% Injectable 25 Gram(s) IV Push once  dextrose 50% Injectable 25 Gram(s) IV Push once  epoetin cyndie Injectable 74721 Unit(s) IV Push <User Schedule>  ferrous    sulfate 325 milliGRAM(s) Oral daily  folic acid 1 milliGRAM(s) Oral daily  furosemide    Tablet 80 milliGRAM(s) Oral daily  gabapentin 300 milliGRAM(s) Oral at bedtime  heparin  Injectable 5000 Unit(s) SubCutaneous every 8 hours  hydrALAZINE 50 milliGRAM(s) Oral three times a day  influenza   Vaccine 0.5 milliLiter(s) IntraMuscular once  insulin lispro (HumaLOG) corrective regimen sliding scale   SubCutaneous three times a day before meals  isosorbide   mononitrate ER Tablet (IMDUR) 30 milliGRAM(s) Oral daily  mirtazapine 7.5 milliGRAM(s) Oral at bedtime  pantoprazole    Tablet 40 milliGRAM(s) Oral before breakfast  PARoxetine 20 milliGRAM(s) Oral daily  risperiDONE   Tablet 1 milliGRAM(s) Oral every 12 hours  sevelamer carbonate 2400 milliGRAM(s) Oral three times a day with meals  simvastatin 40 milliGRAM(s) Oral at bedtime    12-26    129<L>  |  85<L>  |  57<H>  ----------------------------<  145<H>  5.1   |  28  |  10.61<H>    Ca    9.8      26 Dec 2019 18:23      Creatinine Trend: 10.61 <--, 8.87 <--, 8.01 <--, 10.97 <--, 10.28 <--                        8.2    8.11  )-----------( 410      ( 26 Dec 2019 23:24 )             28.4

## 2019-12-27 NOTE — DISCHARGE NOTE PROVIDER - NSDCMRMEDTOKEN_GEN_ALL_CORE_FT
amLODIPine 2.5 mg oral tablet: 1 tab(s) orally once a day  Bumex 2 mg oral tablet: 1 tab(s) orally 3 times a day  carvedilol 6.25 mg oral tablet: 1 tab(s) orally every 12 hours  hydrALAZINE 25 mg oral tablet: 1 tab(s) orally 3 times a day  NOTE: last dispensed June 2019  ipratropium-albuterol 0.5 mg-2.5 mg/3 mLinhalation solution: 3 milliliter(s) inhaled every 8 hours  NOTE: last dispensed June 2019   Lasix 80 mg oral tablet: 1 tab(s) orally 2 times a day  NOTE: last dispensed July 2019 as 1 tab daily, but per pt takes twice daily  methocarbamol 500 mg oral tablet: 1 tab(s) orally every 12 hours, As needed, spasms  mirtazapine 7.5 mg oral tablet: 1 tab(s) orally once a day (at bedtime)  NOTE: last dispensed May 2019  PARoxetine 20 mg oral tablet: 1 tab(s) orally once a day (at bedtime)  Pataday 0.2% ophthalmic solution: 1 drop(s) in each eye once a day  Plavix 75 mg oral tablet: 1 tab(s) orally once a day  NOTE: last dispensed February 2019  Restasis 0.05% ophthalmic emulsion: 1 drop(s) in each eye once a day  risperiDONE 1 mg oral tablet: 1 tab(s) orally every 12 hours  sevelamer carbonate 800 mg oral tablet: 1 tab(s) orally 3 times a day (with meals)  NOTE: last dispensed May 2019  Symbicort 160 mcg-4.5 mcg/inh inhalation aerosol: 2 puff(s) inhaled 2 times a day ascorbic acid 500 mg oral tablet: 1 tab(s) orally once a day  carvedilol 6.25 mg oral tablet: 1 tab(s) orally every 12 hours  clonazePAM 0.5 mg oral tablet: 1 tab(s) orally every 12 hours  clopidogrel 75 mg oral tablet: 1 tab(s) orally once a day  ferrous sulfate 325 mg (65 mg elemental iron) oral delayed release tablet: 1 tab(s) orally once a day  folic acid 1 mg oral tablet: 1 tab(s) orally once a day  furosemide 80 mg oral tablet: 1 tab(s) orally once a day  gabapentin 300 mg oral capsule: 1 cap(s) orally once a day (at bedtime)  hydrALAZINE 50 mg oral tablet: 1 tab(s) orally 3 times a day  ipratropium-albuterol 0.5 mg-2.5 mg/3 mLinhalation solution: 3 milliliter(s) inhaled every 8 hours  NOTE: last dispensed June 2019   isosorbide mononitrate 30 mg oral tablet, extended release: 1 tab(s) orally once a day  methocarbamol 500 mg oral tablet: 1 tab(s) orally every 12 hours, As needed, spasms  mirtazapine 7.5 mg oral tablet: 1 tab(s) orally once a day (at bedtime)  NOTE: last dispensed May 2019  pantoprazole 40 mg oral delayed release tablet: 1 tab(s) orally once a day (before a meal)  PARoxetine 20 mg oral tablet: 1 tab(s) orally once a day  Pataday 0.2% ophthalmic solution: 1 drop(s) in each eye once a day  Restasis 0.05% ophthalmic emulsion: 1 drop(s) in each eye once a day  risperiDONE 1 mg oral tablet: 1 tab(s) orally every 12 hours  sevelamer carbonate 800 mg oral tablet: 3 tab(s) orally 3 times a day (with meals)  simvastatin 40 mg oral tablet: 1 tab(s) orally once a day (at bedtime)  Symbicort 160 mcg-4.5 mcg/inh inhalation aerosol: 2 puff(s) inhaled 2 times a day  traMADol 50 mg oral tablet: 0.5 tab(s) orally every 8 hours, As needed, Severe Pain (7 - 10) ascorbic acid 500 mg oral tablet: 1 tab(s) orally once a day  carvedilol 6.25 mg oral tablet: 1 tab(s) orally every 12 hours  clonazePAM 0.5 mg oral tablet: 1 tab(s) orally every 12 hours  clopidogrel 75 mg oral tablet: 1 tab(s) orally once a day  epoetin cyndie: 18606 unit(s) intravenous once a week  ferrous sulfate 325 mg (65 mg elemental iron) oral delayed release tablet: 1 tab(s) orally once a day  folic acid 1 mg oral tablet: 1 tab(s) orally once a day  furosemide 80 mg oral tablet: 1 tab(s) orally once a day  gabapentin 300 mg oral capsule: 1 cap(s) orally once a day (at bedtime)  hydrALAZINE 50 mg oral tablet: 1 tab(s) orally 3 times a day  ipratropium-albuterol 0.5 mg-2.5 mg/3 mLinhalation solution: 3 milliliter(s) inhaled every 8 hours  NOTE: last dispensed June 2019   isosorbide mononitrate 30 mg oral tablet, extended release: 1 tab(s) orally once a day  methocarbamol 500 mg oral tablet: 1 tab(s) orally every 12 hours, As needed, spasms  mirtazapine 7.5 mg oral tablet: 1 tab(s) orally once a day (at bedtime)  NOTE: last dispensed May 2019  pantoprazole 40 mg oral delayed release tablet: 1 tab(s) orally once a day (before a meal)  PARoxetine 20 mg oral tablet: 1 tab(s) orally once a day  Pataday 0.2% ophthalmic solution: 1 drop(s) in each eye once a day  polyethylene glycol 3350 oral powder for reconstitution: 17 gram(s) orally once a day  Restasis 0.05% ophthalmic emulsion: 1 drop(s) in each eye once a day  risperiDONE 1 mg oral tablet: 1 tab(s) orally every 12 hours  senna oral tablet: 2 tab(s) orally once a day (at bedtime)  sevelamer carbonate 800 mg oral tablet: 3 tab(s) orally 3 times a day (with meals)  simvastatin 40 mg oral tablet: 1 tab(s) orally once a day (at bedtime)  Symbicort 160 mcg-4.5 mcg/inh inhalation aerosol: 2 puff(s) inhaled 2 times a day  traMADol 50 mg oral tablet: 0.5 tab(s) orally every 8 hours, As needed, Severe Pain (7 - 10) ascorbic acid 500 mg oral tablet: 1 tab(s) orally once a day  carvedilol 6.25 mg oral tablet: 1 tab(s) orally every 12 hours  clonazePAM 0.5 mg oral tablet: 1 tab(s) orally every 12 hours  clopidogrel 75 mg oral tablet: 1 tab(s) orally once a day  epoetin cyndie: 54729 unit(s) intravenous once a week  ferrous sulfate 325 mg (65 mg elemental iron) oral delayed release tablet: 1 tab(s) orally once a day  folic acid 1 mg oral tablet: 1 tab(s) orally once a day  furosemide 80 mg oral tablet: 1 tab(s) orally once a day  gabapentin 300 mg oral capsule: 1 cap(s) orally once a day (at bedtime)  hydrALAZINE 50 mg oral tablet: 1 tab(s) orally 3 times a day  ipratropium-albuterol 0.5 mg-2.5 mg/3 mLinhalation solution: 3 milliliter(s) inhaled every 8 hours  NOTE: last dispensed June 2019   isosorbide mononitrate 30 mg oral tablet, extended release: 1 tab(s) orally once a day  methocarbamol 500 mg oral tablet: 1 tab(s) orally every 12 hours, As needed, spasms  mirtazapine 7.5 mg oral tablet: 1 tab(s) orally once a day (at bedtime)  NOTE: last dispensed May 2019  pantoprazole 40 mg oral delayed release tablet: 1 tab(s) orally once a day (before a meal)  PARoxetine 20 mg oral tablet: 1 tab(s) orally once a day  Pataday 0.2% ophthalmic solution: 1 drop(s) in each eye once a day  polyethylene glycol 3350 oral powder for reconstitution: 17 gram(s) orally once a day  Restasis 0.05% ophthalmic emulsion: 1 drop(s) in each eye once a day  risperiDONE 1 mg oral tablet: 1 tab(s) orally every 12 hours  senna oral tablet: 2 tab(s) orally once a day (at bedtime)  sevelamer carbonate 800 mg oral tablet: 3 tab(s) orally 3 times a day (with meals)  simvastatin 40 mg oral tablet: 1 tab(s) orally once a day (at bedtime)  Symbicort 160 mcg-4.5 mcg/inh inhalation aerosol: 2 puff(s) inhaled 2 times a day  traMADol 50 mg oral tablet: 0.5 tab(s) orally every 8 hours, As needed, Severe Pain (7 - 10) ascorbic acid 500 mg oral tablet: 1 tab(s) orally once a day  carvedilol 6.25 mg oral tablet: 1 tab(s) orally every 12 hours  clonazePAM 0.5 mg oral tablet: 1 tab(s) orally every 12 hours  clopidogrel 75 mg oral tablet: 1 tab(s) orally once a day  epoetin cyndie: 83706 unit(s) intravenous 3 times a week with HD  ferrous sulfate 325 mg (65 mg elemental iron) oral delayed release tablet: 1 tab(s) orally once a day  folic acid 1 mg oral tablet: 1 tab(s) orally once a day  furosemide 80 mg oral tablet: 1 tab(s) orally once a day  gabapentin 300 mg oral capsule: 1 cap(s) orally once a day (at bedtime)  ipratropium-albuterol 0.5 mg-2.5 mg/3 mLinhalation solution: 3 milliliter(s) inhaled every 8 hours  NOTE: last dispensed June 2019   isosorbide mononitrate 30 mg oral tablet, extended release: 1 tab(s) orally once a day  methocarbamol 500 mg oral tablet: 1 tab(s) orally every 12 hours, As needed, spasms  mirtazapine 7.5 mg oral tablet: 1 tab(s) orally once a day (at bedtime)  NOTE: last dispensed May 2019  pantoprazole 40 mg oral delayed release tablet: 1 tab(s) orally once a day (before a meal)  PARoxetine 20 mg oral tablet: 1 tab(s) orally once a day  Pataday 0.2% ophthalmic solution: 1 drop(s) in each eye once a day  polyethylene glycol 3350 oral powder for reconstitution: 17 gram(s) orally once a day  Restasis 0.05% ophthalmic emulsion: 1 drop(s) in each eye once a day  risperiDONE 1 mg oral tablet: 1 tab(s) orally every 12 hours  senna oral tablet: 2 tab(s) orally once a day (at bedtime)  sevelamer carbonate 800 mg oral tablet: 3 tab(s) orally 3 times a day (with meals)  simvastatin 40 mg oral tablet: 1 tab(s) orally once a day (at bedtime)  Symbicort 160 mcg-4.5 mcg/inh inhalation aerosol: 2 puff(s) inhaled 2 times a day  traMADol 50 mg oral tablet: 0.5 tab(s) orally every 8 hours, As needed, Severe Pain (7 - 10)

## 2019-12-27 NOTE — PROGRESS NOTE ADULT - SUBJECTIVE AND OBJECTIVE BOX
PRESTON JOHNSON  39y Male  MRN:[id number]    Patient is a 39y old  Male who presents with a chief complaint of presyncope (24 Dec 2019 11:58)    HPI:  41 y/o M pt with multiple co morbid conditions including ESRD on hemodialysis 3 x weekly, COPD on home oxygen at baseline, obesity, HTN, DM2, migraine HA, schizophrenia and bipolar disorder, came for eval of AVF due to decreased thrill, then during ED stay RN who found patient slumped over in bed requiring sternal rub.  at time of evaluation. vitals stable. admitted for near syncope work up  Patient receives HD at [facility] via [facility] MWF, missed Sunday holiday scheduled HD (23 Dec 2019 13:24)      Patient seen and evaluated at bedside. No acute events overnight except as noted.    Interval HPI: no acute events o/n     PAST MEDICAL & SURGICAL HISTORY:  COPD (chronic obstructive pulmonary disease)  Migraine  HTN (hypertension)  DM (diabetes mellitus)  Bipolar disorder  Schizophrenia  ESRD on dialysis  Morbid obesity with BMI of 40.0-44.9, adult  H/O hernia repair      REVIEW OF SYSTEMS:  as per hpi    VITALS:  Vital Signs Last 24 Hrs  T(C): 36.3 (27 Dec 2019 11:15), Max: 37.1 (27 Dec 2019 04:14)  T(F): 97.3 (27 Dec 2019 11:15), Max: 98.7 (27 Dec 2019 04:14)  HR: 81 (27 Dec 2019 11:15) (81 - 96)  BP: 116/66 (27 Dec 2019 11:15) (103/60 - 128/71)  BP(mean): --  RR: 18 (27 Dec 2019 11:15) (18 - 20)  SpO2: 98% (27 Dec 2019 11:15) (98% - 99%)      PHYSICAL EXAM:  GENERAL: NAD, well-developed, obese, lethargic but arousable   HEAD:  Atraumatic, Normocephalic  EYES: EOMI, PERRLA, conjunctiva and sclera clear  NECK: Supple, No JVD  CHEST/LUNG: Clear to auscultation bilaterally; No wheeze  HEART: S1, S2; No murmurs, rubs, or gallops  ABDOMEN: Soft, Nontender, Nondistended; Bowel sounds present  EXTREMITIES:  2+ Peripheral Pulses, No clubbing, cyanosis,+  edema  PSYCH: Normal affect     Consultant(s) Notes Reviewed:  [x ] YES  [ ] NO  Care Discussed with Consultants/Other Providers [ x] YES  [ ] NO    MEDS:  MEDICATIONS  (STANDING):  ALBUTerol    90 MICROgram(s) HFA Inhaler 2 Puff(s) Inhalation every 6 hours  ascorbic acid 500 milliGRAM(s) Oral daily  carvedilol 6.25 milliGRAM(s) Oral every 12 hours  chlorhexidine 4% Liquid 1 Application(s) Topical once  clonazePAM  Tablet 0.5 milliGRAM(s) Oral every 12 hours  clopidogrel Tablet 75 milliGRAM(s) Oral daily  dextrose 5%. 1000 milliLiter(s) (50 mL/Hr) IV Continuous <Continuous>  dextrose 50% Injectable 12.5 Gram(s) IV Push once  dextrose 50% Injectable 25 Gram(s) IV Push once  dextrose 50% Injectable 25 Gram(s) IV Push once  epoetin cyndie Injectable 90697 Unit(s) IV Push <User Schedule>  ferrous    sulfate 325 milliGRAM(s) Oral daily  folic acid 1 milliGRAM(s) Oral daily  furosemide    Tablet 80 milliGRAM(s) Oral daily  gabapentin 300 milliGRAM(s) Oral at bedtime  heparin  Injectable 5000 Unit(s) SubCutaneous every 8 hours  hydrALAZINE 50 milliGRAM(s) Oral three times a day  insulin lispro (HumaLOG) corrective regimen sliding scale   SubCutaneous three times a day before meals  isosorbide   mononitrate ER Tablet (IMDUR) 30 milliGRAM(s) Oral daily  mirtazapine 7.5 milliGRAM(s) Oral at bedtime  pantoprazole    Tablet 40 milliGRAM(s) Oral before breakfast  PARoxetine 20 milliGRAM(s) Oral daily  risperiDONE   Tablet 1 milliGRAM(s) Oral every 12 hours  sevelamer carbonate 2400 milliGRAM(s) Oral three times a day with meals  simvastatin 40 milliGRAM(s) Oral at bedtime    MEDICATIONS  (PRN):  dextrose 40% Gel 15 Gram(s) Oral once PRN Blood Glucose LESS THAN 70 milliGRAM(s)/deciliter  glucagon  Injectable 1 milliGRAM(s) IntraMuscular once PRN Glucose LESS THAN 70 milligrams/deciliter  methocarbamol 500 milliGRAM(s) Oral every 12 hours PRN spasms  traMADol 25 milliGRAM(s) Oral every 8 hours PRN Severe Pain (7 - 10)      ALLERGIES:  aspirin (Swelling)  fish (Unknown)  penicillins (Swelling)  shellfish (Unknown)      LABS:                              8.2    8.11  )-----------( 410      ( 26 Dec 2019 23:24 )             28.4   12-26    129<L>  |  85<L>  |  57<H>  ----------------------------<  145<H>  5.1   |  28  |  10.61<H>    Ca    9.8      26 Dec 2019 18:23           < from: CT Head No Cont (12.23.19 @ 18:03) >    IMPRESSION: Unremarkable noncontrast CT of the brain. No change from 2/21/2019.        < end of copied text >

## 2019-12-27 NOTE — PROGRESS NOTE ADULT - ASSESSMENT
39 y/o M pt with multiple co morbid conditions including ESRD on hemodialysis 3 x weekly, COPD on home oxygen at baseline, obesity, HTN, DM2, migraine HA, schizophrenia and bipolar disorder, came for eval of AVF due to decreased thrill, then during ED stay RN who found patient slumped over in bed requiring sternal rub.  at time of evaluation. vitals stable. admitted for near syncope work up  Patient receives HD at [facility] via [facility] MWF, missed Chidi holiday scheduled HD

## 2019-12-27 NOTE — PROGRESS NOTE ADULT - ASSESSMENT
39 yo male extensive pmhx as noted above including esrd on hd, copd on o2, obesity, htn, dm, migraine, schizo, bipolar, a/w presyncope and fluid overload    fluid overload  renal consulted  plan for HD as per renal      presyncope   CT head noted   check echo - ok to do outpt  cards consulted    DM  insulin as per endo    copd/alice  on home o2  pulm consulted  f/u abg today     cont all prior home meds    dvt ppx    PT  dc planning rehab today if abg ok        Advanced care planning was discussed with patient and family.  Advanced care planning forms were reviewed and discussed.  Differential diagnosis and plan of care discussed with patient after the evaluation.   Pain assessed and judicious use of narcotics when appropriate was discussed.  Importance of Fall prevention discussed.  Counseling on Smoking and Alcohol cessation was offered when appropriate.  Counseling on Diet, exercise, and medication compliance was done.

## 2019-12-27 NOTE — CHART NOTE - NSCHARTNOTEFT_GEN_A_CORE
Informed by Rn that pt was c/o 10/10 leg pain in both legs.   Pt has history of neuropathic pain.  Pt seen and evaluated at bedside. Pt is c/o 10/10 leg pain below the knee level b/l.    B/l leg pain    - Pt is alert and oriented x3.   - Pt with hx of neuropathic pain. Will give oxycodone 5mg IR x 1  - Will continue to follow and assess.    -Rubén Esquivel PA-C

## 2019-12-27 NOTE — PROGRESS NOTE ADULT - SUBJECTIVE AND OBJECTIVE BOX
Follow-up Pulm Progress Note  Manuelito Mendez MD  [phone]    Afebrile, hemodynamically stable  Stable resp status      Vital Signs Last 24 Hrs  T(C): 36.3 (27 Dec 2019 11:15), Max: 37.1 (27 Dec 2019 04:14)  T(F): 97.3 (27 Dec 2019 11:15), Max: 98.7 (27 Dec 2019 04:14)  HR: 81 (27 Dec 2019 11:15) (77 - 96)  BP: 116/66 (27 Dec 2019 11:15) (103/60 - 128/71)  BP(mean): --  RR: 18 (27 Dec 2019 11:15) (18 - 20)  SpO2: 98% (27 Dec 2019 11:15) (97% - 99%)                         8.2    8.11  )-----------( 410      ( 26 Dec 2019 23:24 )             28.4       12-26    129<L>  |  85<L>  |  57<H>  ----------------------------<  145<H>  5.1   |  28  |  10.61<H>    Ca    9.8      26 Dec 2019 18:23       PTT - ( 22 Dec 2019 18:14 )  PTT:33.7 sec    Physical Examination:  PULM: No wheeze or rhonchi  CVS: Regular rate and rhythm, no murmurs, rubs, or gallops  ABD: Soft, non-tender  EXT:  No clubbing, cyanosis, or edema    RADIOLOGY REVIEWED  CXR:    CT chest:    TTE:

## 2019-12-27 NOTE — DISCHARGE NOTE PROVIDER - CARE PROVIDER_API CALL
Eliceo Coburn)  Family Medicine  [address] [place] [address]  Phone: [phone]  Fax: [phone]  Established Patient  Follow Up Time: 1 week

## 2019-12-27 NOTE — DISCHARGE NOTE PROVIDER - NSDCCPCAREPLAN_GEN_ALL_CORE_FT
PRINCIPAL DISCHARGE DIAGNOSIS  Diagnosis: Near syncope  Assessment and Plan of Treatment: CT Head was negative for intracranial pathology.      SECONDARY DISCHARGE DIAGNOSES  Diagnosis: ESRD on dialysis  Assessment and Plan of Treatment: Continue with HD. PRINCIPAL DISCHARGE DIAGNOSIS  Diagnosis: ESRD on dialysis  Assessment and Plan of Treatment: Continue with HD.      SECONDARY DISCHARGE DIAGNOSES  Diagnosis: Near syncope  Assessment and Plan of Treatment: CT was negative for intracranial pathology

## 2019-12-27 NOTE — PROGRESS NOTE ADULT - SUBJECTIVE AND OBJECTIVE BOX
Subjective: Patient seen and examined. No new events except as noted.   leg pain     REVIEW OF SYSTEMS:    CONSTITUTIONAL: + weakness, fevers or chills  EYES/ENT: No visual changes;  No vertigo or throat pain   NECK: No pain or stiffness  RESPIRATORY: No cough, wheezing, hemoptysis; No shortness of breath  CARDIOVASCULAR: No chest pain or palpitations  GASTROINTESTINAL: No abdominal or epigastric pain. No nausea, vomiting, or hematemesis; No diarrhea or constipation. No melena or hematochezia.  GENITOURINARY: No dysuria, frequency or hematuria  NEUROLOGICAL: No numbness or weakness  SKIN: No itching, burning, rashes, or lesions   All other review of systems is negative unless indicated above.    MEDICATIONS:  MEDICATIONS  (STANDING):  ALBUTerol    90 MICROgram(s) HFA Inhaler 2 Puff(s) Inhalation every 6 hours  ascorbic acid 500 milliGRAM(s) Oral daily  carvedilol 6.25 milliGRAM(s) Oral every 12 hours  chlorhexidine 4% Liquid 1 Application(s) Topical once  clonazePAM  Tablet 0.5 milliGRAM(s) Oral every 12 hours  clopidogrel Tablet 75 milliGRAM(s) Oral daily  dextrose 5%. 1000 milliLiter(s) (50 mL/Hr) IV Continuous <Continuous>  dextrose 50% Injectable 12.5 Gram(s) IV Push once  dextrose 50% Injectable 25 Gram(s) IV Push once  dextrose 50% Injectable 25 Gram(s) IV Push once  epoetin cyndie Injectable 75484 Unit(s) IV Push <User Schedule>  ferrous    sulfate 325 milliGRAM(s) Oral daily  folic acid 1 milliGRAM(s) Oral daily  furosemide    Tablet 80 milliGRAM(s) Oral daily  gabapentin 300 milliGRAM(s) Oral at bedtime  heparin  Injectable 5000 Unit(s) SubCutaneous every 8 hours  hydrALAZINE 50 milliGRAM(s) Oral three times a day  influenza   Vaccine 0.5 milliLiter(s) IntraMuscular once  insulin lispro (HumaLOG) corrective regimen sliding scale   SubCutaneous three times a day before meals  isosorbide   mononitrate ER Tablet (IMDUR) 30 milliGRAM(s) Oral daily  mirtazapine 7.5 milliGRAM(s) Oral at bedtime  pantoprazole    Tablet 40 milliGRAM(s) Oral before breakfast  PARoxetine 20 milliGRAM(s) Oral daily  risperiDONE   Tablet 1 milliGRAM(s) Oral every 12 hours  sevelamer carbonate 2400 milliGRAM(s) Oral three times a day with meals  simvastatin 40 milliGRAM(s) Oral at bedtime      PHYSICAL EXAM:  T(C): 37.1 (12-27-19 @ 04:14), Max: 37.1 (12-27-19 @ 04:14)  HR: 96 (12-27-19 @ 07:47) (77 - 96)  BP: 110/66 (12-27-19 @ 07:47) (103/60 - 128/71)  RR: 19 (12-27-19 @ 04:14) (18 - 20)  SpO2: 98% (12-27-19 @ 04:14) (97% - 99%)  Wt(kg): --  I&O's Summary    26 Dec 2019 07:01  -  27 Dec 2019 07:00  --------------------------------------------------------  IN: 720 mL / OUT: 6100 mL / NET: -5380 mL    27 Dec 2019 07:01  -  27 Dec 2019 08:53  --------------------------------------------------------  IN: 120 mL / OUT: 0 mL / NET: 120 mL            Appearance: NAD morbidly obese   HEENT:   Normal oral mucosa, PERRL, EOMI	  Lymphatic: No lymphadenopathy , no edema  Cardiovascular: Normal S1 S2, No JVD, No murmurs , Peripheral pulses palpable 2+ bilaterally  Respiratory: Lungs clear to auscultation, normal effort 	  Gastrointestinal:  Soft, Non-tender, + BS	  Skin: No rashes, No ecchymoses, No cyanosis, warm to touch  Musculoskeletal: Normal range of motion, normal strength  Psychiatry:  Mood & affect appropriate  Ext: No edema. LUE AV fistula + thrill         LABS:    CARDIAC MARKERS:                                8.2    8.11  )-----------( 410      ( 26 Dec 2019 23:24 )             28.4     12-26    129<L>  |  85<L>  |  57<H>  ----------------------------<  145<H>  5.1   |  28  |  10.61<H>    Ca    9.8      26 Dec 2019 18:23      proBNP:   Lipid Profile:   HgA1c:   TSH:             TELEMETRY: 	  SR  ECG:  	  RADIOLOGY:   DIAGNOSTIC TESTING:  [ ] Echocardiogram:  [ ]  Catheterization:  [ ] Stress Test:    OTHER:

## 2019-12-27 NOTE — PROGRESS NOTE ADULT - PROBLEM SELECTOR PLAN 1
Tolerated HD well yesterday, next due outpatient on Sunday (holiday schedule)  Dose meds for ESRD  Daily BMP

## 2019-12-28 LAB
GLUCOSE BLDC GLUCOMTR-MCNC: 103 MG/DL — HIGH (ref 70–99)
GLUCOSE BLDC GLUCOMTR-MCNC: 118 MG/DL — HIGH (ref 70–99)

## 2019-12-28 PROCEDURE — 93306 TTE W/DOPPLER COMPLETE: CPT | Mod: 26

## 2019-12-28 RX ORDER — HYDRALAZINE HCL 50 MG
10 TABLET ORAL THREE TIMES A DAY
Refills: 0 | Status: DISCONTINUED | OUTPATIENT
Start: 2019-12-28 | End: 2019-12-31

## 2019-12-28 RX ORDER — ERYTHROPOIETIN 10000 [IU]/ML
20000 INJECTION, SOLUTION INTRAVENOUS; SUBCUTANEOUS
Refills: 0 | Status: DISCONTINUED | OUTPATIENT
Start: 2019-12-28 | End: 2020-01-06

## 2019-12-28 RX ADMIN — ERYTHROPOIETIN 20000 UNIT(S): 10000 INJECTION, SOLUTION INTRAVENOUS; SUBCUTANEOUS at 20:53

## 2019-12-28 RX ADMIN — TRAMADOL HYDROCHLORIDE 25 MILLIGRAM(S): 50 TABLET ORAL at 23:51

## 2019-12-28 RX ADMIN — SEVELAMER CARBONATE 2400 MILLIGRAM(S): 2400 POWDER, FOR SUSPENSION ORAL at 07:55

## 2019-12-28 RX ADMIN — METHOCARBAMOL 500 MILLIGRAM(S): 500 TABLET, FILM COATED ORAL at 12:43

## 2019-12-28 RX ADMIN — PANTOPRAZOLE SODIUM 40 MILLIGRAM(S): 20 TABLET, DELAYED RELEASE ORAL at 06:00

## 2019-12-28 RX ADMIN — ALBUTEROL 2 PUFF(S): 90 AEROSOL, METERED ORAL at 12:38

## 2019-12-28 RX ADMIN — CLOPIDOGREL BISULFATE 75 MILLIGRAM(S): 75 TABLET, FILM COATED ORAL at 12:40

## 2019-12-28 RX ADMIN — SEVELAMER CARBONATE 2400 MILLIGRAM(S): 2400 POWDER, FOR SUSPENSION ORAL at 12:41

## 2019-12-28 RX ADMIN — ALBUTEROL 2 PUFF(S): 90 AEROSOL, METERED ORAL at 06:00

## 2019-12-28 RX ADMIN — ISOSORBIDE MONONITRATE 30 MILLIGRAM(S): 60 TABLET, EXTENDED RELEASE ORAL at 12:40

## 2019-12-28 RX ADMIN — Medication 80 MILLIGRAM(S): at 12:38

## 2019-12-28 RX ADMIN — Medication 325 MILLIGRAM(S): at 12:39

## 2019-12-28 RX ADMIN — TRAMADOL HYDROCHLORIDE 25 MILLIGRAM(S): 50 TABLET ORAL at 07:53

## 2019-12-28 RX ADMIN — Medication 20 MILLIGRAM(S): at 12:40

## 2019-12-28 RX ADMIN — Medication 500 MILLIGRAM(S): at 12:39

## 2019-12-28 RX ADMIN — TRAMADOL HYDROCHLORIDE 25 MILLIGRAM(S): 50 TABLET ORAL at 08:40

## 2019-12-28 RX ADMIN — RISPERIDONE 1 MILLIGRAM(S): 4 TABLET ORAL at 06:00

## 2019-12-28 RX ADMIN — Medication 1 MILLIGRAM(S): at 12:39

## 2019-12-28 RX ADMIN — HEPARIN SODIUM 5000 UNIT(S): 5000 INJECTION INTRAVENOUS; SUBCUTANEOUS at 06:00

## 2019-12-28 NOTE — PROVIDER CONTACT NOTE (OTHER) - RECOMMENDATIONS
HANS,
pending orders. Respiratory therapist at bedside explaining the need for Bipap.
NP made aware, RT available.
adjust PATIENT'S CARDIAC AND BP MEDS

## 2019-12-28 NOTE — PROVIDER CONTACT NOTE (OTHER) - ACTION/TREATMENT ORDERED:
will continue to monitor. patient on 4L O2. ABG performed. fingerstick taken.
will continue to attempt to contact NP. will continue to monitor patient.
Provider States to recheck in 1 hour
Will continue to monitor patient.

## 2019-12-28 NOTE — PROGRESS NOTE ADULT - SUBJECTIVE AND OBJECTIVE BOX
[facility] - HUSSAIN Sanchez / HUSSAIN Villalobos / ERNESTO Mehta/ HUSSAIN Mauricio/ HUSSAIN Barber/ IVONNE Hui / JADEN La / BRANDON Alexander  ---------------------------------------------------------------------------------------------------------------  seen and examined today for ESRD on HD  Interval : NAD  VITALS:  T(F): 98.2 (12-28-19 @ 11:25), Max: 98.4 (12-28-19 @ 04:23)  HR: 83 (12-28-19 @ 11:25)  BP: 115/64 (12-28-19 @ 11:25)  RR: 16 (12-28-19 @ 11:25)  SpO2: 98% (12-28-19 @ 11:25)  Wt(kg): --    12-27 @ 07:01  -  12-28 @ 07:00  --------------------------------------------------------  IN: 960 mL / OUT: 2200 mL / NET: -1240 mL    12-28 @ 07:01  -  12-28 @ 12:44  --------------------------------------------------------  IN: 120 mL / OUT: 0 mL / NET: 120 mL      Physical Exam :-  Constitutional: NAD  Neck: Supple.  Respiratory: Bilateral equal breath sounds,  Cardiovascular: S1, S2 normal,  Gastrointestinal: Bowel Sounds present, soft, non tender.  Extremities: 3+ edema  Neurological: Alert and Oriented x 3, no focal deficits  Psychiatric: Normal mood, normal affect  Data:-  Allergies :   aspirin (Swelling)  fish (Unknown)  penicillins (Swelling)  shellfish (Unknown)    Hospital Medications:   MEDICATIONS  (STANDING):  ALBUTerol    90 MICROgram(s) HFA Inhaler 2 Puff(s) Inhalation every 6 hours  ascorbic acid 500 milliGRAM(s) Oral daily  carvedilol 6.25 milliGRAM(s) Oral every 12 hours  chlorhexidine 4% Liquid 1 Application(s) Topical once  clonazePAM  Tablet 0.5 milliGRAM(s) Oral every 12 hours  clopidogrel Tablet 75 milliGRAM(s) Oral daily  dextrose 5%. 1000 milliLiter(s) (50 mL/Hr) IV Continuous <Continuous>  dextrose 50% Injectable 12.5 Gram(s) IV Push once  dextrose 50% Injectable 25 Gram(s) IV Push once  dextrose 50% Injectable 25 Gram(s) IV Push once  epoetin cyndie Injectable 57491 Unit(s) IV Push <User Schedule>  ferrous    sulfate 325 milliGRAM(s) Oral daily  folic acid 1 milliGRAM(s) Oral daily  furosemide    Tablet 80 milliGRAM(s) Oral daily  gabapentin 300 milliGRAM(s) Oral at bedtime  heparin  Injectable 5000 Unit(s) SubCutaneous every 8 hours  hydrALAZINE 50 milliGRAM(s) Oral three times a day  insulin lispro (HumaLOG) corrective regimen sliding scale   SubCutaneous three times a day before meals  isosorbide   mononitrate ER Tablet (IMDUR) 30 milliGRAM(s) Oral daily  mirtazapine 7.5 milliGRAM(s) Oral at bedtime  pantoprazole    Tablet 40 milliGRAM(s) Oral before breakfast  PARoxetine 20 milliGRAM(s) Oral daily  risperiDONE   Tablet 1 milliGRAM(s) Oral every 12 hours  sevelamer carbonate 2400 milliGRAM(s) Oral three times a day with meals  simvastatin 40 milliGRAM(s) Oral at bedtime    12-26    129<L>  |  85<L>  |  57<H>  ----------------------------<  145<H>  5.1   |  28  |  10.61<H>    Ca    9.8      26 Dec 2019 18:23      Creatinine Trend: 10.61 <--, 8.87 <--, 8.01 <--, 10.97 <--, 10.28 <--                        8.2    8.11  )-----------( 410      ( 26 Dec 2019 23:24 )             28.4

## 2019-12-28 NOTE — PROGRESS NOTE ADULT - PROBLEM SELECTOR PLAN 2
Hold BP meds prior to dialysis BP noted low, decreased Hydralazine to 10mg TID, hold before HD sessions

## 2019-12-28 NOTE — PROGRESS NOTE ADULT - ASSESSMENT
41 yo male extensive pmhx as noted above including esrd on hd, copd on o2, obesity, htn, dm, migraine, schizo, bipolar, a/w presyncope and fluid overload    fluid overload  renal consulted  plan for HD as per renal      presyncope   CT head noted   check echo - ok to do outpt  cards consulted    DM  insulin as per endo    copd/alice  on home o2  pulm following  cpap at night. pt refuses/ non compliant     cont all prior home meds    dvt ppx    PT           Advanced care planning was discussed with patient and family.  Advanced care planning forms were reviewed and discussed.  Differential diagnosis and plan of care discussed with patient after the evaluation.   Pain assessed and judicious use of narcotics when appropriate was discussed.  Importance of Fall prevention discussed.  Counseling on Smoking and Alcohol cessation was offered when appropriate.  Counseling on Diet, exercise, and medication compliance was done.

## 2019-12-28 NOTE — PROGRESS NOTE ADULT - ASSESSMENT
41 y/o M pt with multiple co morbid conditions including ESRD on hemodialysis 3 x weekly, COPD on home oxygen at baseline, obesity, HTN, DM2, migraine HA, schizophrenia and bipolar disorder, came for eval of AVF due to decreased thrill, then during ED stay RN who found patient slumped over in bed requiring sternal rub.  at time of evaluation. vitals stable. admitted for near syncope work up  Patient receives HD at [facility] via [facility] MWF, missed Chidi holiday scheduled HD

## 2019-12-28 NOTE — PROVIDER CONTACT NOTE (OTHER) - ASSESSMENT
vitals as charted.
patient asymptomatic. vitals as charted. patient much more alert than earlier in shift.
PT. AROUSABLE, BUT SLEEPY, ORIENTED
Pt adamant about not using bipap, prior to agreeing to utilize machine after HD tonight. Pt educated on the importance of using bipap for CO2 levels. No s/s of distress noted, patient is lethargic but arousable to voice. VSS, patient currently on 4L NC.

## 2019-12-28 NOTE — PROGRESS NOTE ADULT - PROBLEM SELECTOR PLAN 1
Tolerated UF yesterday, due for HD today with UF as tolerated next -> Monday -> Thusday  Dose meds for ESRD  Daily BMP

## 2019-12-28 NOTE — PROGRESS NOTE ADULT - SUBJECTIVE AND OBJECTIVE BOX
PRESTON JOHNSON  39y Male  MRN:[id number]    Patient is a 39y old  Male who presents with a chief complaint of presyncope (24 Dec 2019 11:58)    HPI:  39 y/o M pt with multiple co morbid conditions including ESRD on hemodialysis 3 x weekly, COPD on home oxygen at baseline, obesity, HTN, DM2, migraine HA, schizophrenia and bipolar disorder, came for eval of AVF due to decreased thrill, then during ED stay RN who found patient slumped over in bed requiring sternal rub.  at time of evaluation. vitals stable. admitted for near syncope work up  Patient receives HD at [facility] via [facility] MWF, missed Sunday holiday scheduled HD (23 Dec 2019 13:24)      Patient seen and evaluated at bedside. No acute events overnight except as noted.    Interval HPI: had HD last night, and additional session tonight     PAST MEDICAL & SURGICAL HISTORY:  COPD (chronic obstructive pulmonary disease)  Migraine  HTN (hypertension)  DM (diabetes mellitus)  Bipolar disorder  Schizophrenia  ESRD on dialysis  Morbid obesity with BMI of 40.0-44.9, adult  H/O hernia repair      REVIEW OF SYSTEMS:  as per hpi    VITALS:  Vital Signs Last 24 Hrs  T(C): 37 (28 Dec 2019 17:15), Max: 37 (28 Dec 2019 17:15)  T(F): 98.6 (28 Dec 2019 17:15), Max: 98.6 (28 Dec 2019 17:15)  HR: 84 (28 Dec 2019 17:15) (77 - 89)  BP: 121/55 (28 Dec 2019 17:15) (100/49 - 123/66)  BP(mean): --  RR: 17 (28 Dec 2019 17:15) (16 - 18)  SpO2: 95% (28 Dec 2019 17:15) (95% - 100%)      PHYSICAL EXAM:  GENERAL: NAD, well-developed, obese, lethargic but arousable   HEAD:  Atraumatic, Normocephalic  EYES: EOMI, PERRLA, conjunctiva and sclera clear  NECK: Supple, No JVD  CHEST/LUNG: Clear to auscultation bilaterally; No wheeze  HEART: S1, S2; No murmurs, rubs, or gallops  ABDOMEN: Soft, Nontender, Nondistended; Bowel sounds present  EXTREMITIES:  2+ Peripheral Pulses, No clubbing, cyanosis,+  edema  PSYCH: Normal affect     Consultant(s) Notes Reviewed:  [x ] YES  [ ] NO  Care Discussed with Consultants/Other Providers [ x] YES  [ ] NO    MEDS:  MEDICATIONS  (STANDING):  ALBUTerol    90 MICROgram(s) HFA Inhaler 2 Puff(s) Inhalation every 6 hours  ascorbic acid 500 milliGRAM(s) Oral daily  carvedilol 6.25 milliGRAM(s) Oral every 12 hours  chlorhexidine 4% Liquid 1 Application(s) Topical once  clonazePAM  Tablet 0.5 milliGRAM(s) Oral every 12 hours  clopidogrel Tablet 75 milliGRAM(s) Oral daily  dextrose 5%. 1000 milliLiter(s) (50 mL/Hr) IV Continuous <Continuous>  dextrose 50% Injectable 12.5 Gram(s) IV Push once  dextrose 50% Injectable 25 Gram(s) IV Push once  dextrose 50% Injectable 25 Gram(s) IV Push once  epoetin cyndie Injectable 72464 Unit(s) IV Push <User Schedule>  ferrous    sulfate 325 milliGRAM(s) Oral daily  folic acid 1 milliGRAM(s) Oral daily  furosemide    Tablet 80 milliGRAM(s) Oral daily  gabapentin 300 milliGRAM(s) Oral at bedtime  heparin  Injectable 5000 Unit(s) SubCutaneous every 8 hours  hydrALAZINE 10 milliGRAM(s) Oral three times a day  insulin lispro (HumaLOG) corrective regimen sliding scale   SubCutaneous three times a day before meals  isosorbide   mononitrate ER Tablet (IMDUR) 30 milliGRAM(s) Oral daily  mirtazapine 7.5 milliGRAM(s) Oral at bedtime  pantoprazole    Tablet 40 milliGRAM(s) Oral before breakfast  PARoxetine 20 milliGRAM(s) Oral daily  risperiDONE   Tablet 1 milliGRAM(s) Oral every 12 hours  sevelamer carbonate 2400 milliGRAM(s) Oral three times a day with meals  simvastatin 40 milliGRAM(s) Oral at bedtime    MEDICATIONS  (PRN):  dextrose 40% Gel 15 Gram(s) Oral once PRN Blood Glucose LESS THAN 70 milliGRAM(s)/deciliter  glucagon  Injectable 1 milliGRAM(s) IntraMuscular once PRN Glucose LESS THAN 70 milligrams/deciliter  methocarbamol 500 milliGRAM(s) Oral every 12 hours PRN spasms  traMADol 25 milliGRAM(s) Oral every 8 hours PRN Severe Pain (7 - 10)      ALLERGIES:  aspirin (Swelling)  fish (Unknown)  penicillins (Swelling)  shellfish (Unknown)      LABS:                               8.2    8.11  )-----------( 410      ( 26 Dec 2019 23:24 )             28.4            < from: CT Head No Cont (12.23.19 @ 18:03) >    IMPRESSION: Unremarkable noncontrast CT of the brain. No change from 2/21/2019.        < end of copied text >

## 2019-12-29 LAB
ANION GAP SERPL CALC-SCNC: 13 MMOL/L — SIGNIFICANT CHANGE UP (ref 5–17)
BUN SERPL-MCNC: 23 MG/DL — SIGNIFICANT CHANGE UP (ref 7–23)
CALCIUM SERPL-MCNC: 10.5 MG/DL — SIGNIFICANT CHANGE UP (ref 8.4–10.5)
CHLORIDE SERPL-SCNC: 92 MMOL/L — LOW (ref 96–108)
CO2 SERPL-SCNC: 28 MMOL/L — SIGNIFICANT CHANGE UP (ref 22–31)
CREAT SERPL-MCNC: 6.08 MG/DL — HIGH (ref 0.5–1.3)
GLUCOSE BLDC GLUCOMTR-MCNC: 102 MG/DL — HIGH (ref 70–99)
GLUCOSE BLDC GLUCOMTR-MCNC: 115 MG/DL — HIGH (ref 70–99)
GLUCOSE BLDC GLUCOMTR-MCNC: 119 MG/DL — HIGH (ref 70–99)
GLUCOSE BLDC GLUCOMTR-MCNC: 99 MG/DL — SIGNIFICANT CHANGE UP (ref 70–99)
GLUCOSE SERPL-MCNC: 107 MG/DL — HIGH (ref 70–99)
HCT VFR BLD CALC: 30.6 % — LOW (ref 39–50)
HGB BLD-MCNC: 8.8 G/DL — LOW (ref 13–17)
MCHC RBC-ENTMCNC: 28.2 PG — SIGNIFICANT CHANGE UP (ref 27–34)
MCHC RBC-ENTMCNC: 28.8 GM/DL — LOW (ref 32–36)
MCV RBC AUTO: 98.1 FL — SIGNIFICANT CHANGE UP (ref 80–100)
PLATELET # BLD AUTO: 400 K/UL — SIGNIFICANT CHANGE UP (ref 150–400)
POTASSIUM SERPL-MCNC: 4 MMOL/L — SIGNIFICANT CHANGE UP (ref 3.5–5.3)
POTASSIUM SERPL-SCNC: 4 MMOL/L — SIGNIFICANT CHANGE UP (ref 3.5–5.3)
RBC # BLD: 3.12 M/UL — LOW (ref 4.2–5.8)
RBC # FLD: 18.4 % — HIGH (ref 10.3–14.5)
SODIUM SERPL-SCNC: 133 MMOL/L — LOW (ref 135–145)
WBC # BLD: 8.03 K/UL — SIGNIFICANT CHANGE UP (ref 3.8–10.5)
WBC # FLD AUTO: 8.03 K/UL — SIGNIFICANT CHANGE UP (ref 3.8–10.5)

## 2019-12-29 RX ORDER — HYDROMORPHONE HYDROCHLORIDE 2 MG/ML
1 INJECTION INTRAMUSCULAR; INTRAVENOUS; SUBCUTANEOUS ONCE
Refills: 0 | Status: DISCONTINUED | OUTPATIENT
Start: 2019-12-29 | End: 2019-12-29

## 2019-12-29 RX ADMIN — Medication 20 MILLIGRAM(S): at 12:10

## 2019-12-29 RX ADMIN — HEPARIN SODIUM 5000 UNIT(S): 5000 INJECTION INTRAVENOUS; SUBCUTANEOUS at 00:01

## 2019-12-29 RX ADMIN — ALBUTEROL 2 PUFF(S): 90 AEROSOL, METERED ORAL at 23:53

## 2019-12-29 RX ADMIN — CARVEDILOL PHOSPHATE 6.25 MILLIGRAM(S): 80 CAPSULE, EXTENDED RELEASE ORAL at 18:21

## 2019-12-29 RX ADMIN — Medication 10 MILLIGRAM(S): at 14:04

## 2019-12-29 RX ADMIN — TRAMADOL HYDROCHLORIDE 25 MILLIGRAM(S): 50 TABLET ORAL at 18:55

## 2019-12-29 RX ADMIN — METHOCARBAMOL 500 MILLIGRAM(S): 500 TABLET, FILM COATED ORAL at 01:33

## 2019-12-29 RX ADMIN — Medication 10 MILLIGRAM(S): at 23:53

## 2019-12-29 RX ADMIN — Medication 0.5 MILLIGRAM(S): at 05:57

## 2019-12-29 RX ADMIN — HYDROMORPHONE HYDROCHLORIDE 1 MILLIGRAM(S): 2 INJECTION INTRAMUSCULAR; INTRAVENOUS; SUBCUTANEOUS at 03:24

## 2019-12-29 RX ADMIN — GABAPENTIN 300 MILLIGRAM(S): 400 CAPSULE ORAL at 00:02

## 2019-12-29 RX ADMIN — HEPARIN SODIUM 5000 UNIT(S): 5000 INJECTION INTRAVENOUS; SUBCUTANEOUS at 17:04

## 2019-12-29 RX ADMIN — MIRTAZAPINE 7.5 MILLIGRAM(S): 45 TABLET, ORALLY DISINTEGRATING ORAL at 00:02

## 2019-12-29 RX ADMIN — Medication 325 MILLIGRAM(S): at 12:09

## 2019-12-29 RX ADMIN — SEVELAMER CARBONATE 2400 MILLIGRAM(S): 2400 POWDER, FOR SUSPENSION ORAL at 09:11

## 2019-12-29 RX ADMIN — HYDROMORPHONE HYDROCHLORIDE 1 MILLIGRAM(S): 2 INJECTION INTRAMUSCULAR; INTRAVENOUS; SUBCUTANEOUS at 22:33

## 2019-12-29 RX ADMIN — TRAMADOL HYDROCHLORIDE 25 MILLIGRAM(S): 50 TABLET ORAL at 18:19

## 2019-12-29 RX ADMIN — RISPERIDONE 1 MILLIGRAM(S): 4 TABLET ORAL at 18:20

## 2019-12-29 RX ADMIN — ALBUTEROL 2 PUFF(S): 90 AEROSOL, METERED ORAL at 00:03

## 2019-12-29 RX ADMIN — HEPARIN SODIUM 5000 UNIT(S): 5000 INJECTION INTRAVENOUS; SUBCUTANEOUS at 21:51

## 2019-12-29 RX ADMIN — ISOSORBIDE MONONITRATE 30 MILLIGRAM(S): 60 TABLET, EXTENDED RELEASE ORAL at 12:09

## 2019-12-29 RX ADMIN — SEVELAMER CARBONATE 2400 MILLIGRAM(S): 2400 POWDER, FOR SUSPENSION ORAL at 18:20

## 2019-12-29 RX ADMIN — HYDROMORPHONE HYDROCHLORIDE 1 MILLIGRAM(S): 2 INJECTION INTRAMUSCULAR; INTRAVENOUS; SUBCUTANEOUS at 04:57

## 2019-12-29 RX ADMIN — HYDROMORPHONE HYDROCHLORIDE 1 MILLIGRAM(S): 2 INJECTION INTRAMUSCULAR; INTRAVENOUS; SUBCUTANEOUS at 21:50

## 2019-12-29 RX ADMIN — Medication 80 MILLIGRAM(S): at 09:11

## 2019-12-29 RX ADMIN — MIRTAZAPINE 7.5 MILLIGRAM(S): 45 TABLET, ORALLY DISINTEGRATING ORAL at 23:53

## 2019-12-29 RX ADMIN — Medication 10 MILLIGRAM(S): at 05:57

## 2019-12-29 RX ADMIN — SEVELAMER CARBONATE 2400 MILLIGRAM(S): 2400 POWDER, FOR SUSPENSION ORAL at 14:05

## 2019-12-29 RX ADMIN — RISPERIDONE 1 MILLIGRAM(S): 4 TABLET ORAL at 05:57

## 2019-12-29 RX ADMIN — HEPARIN SODIUM 5000 UNIT(S): 5000 INJECTION INTRAVENOUS; SUBCUTANEOUS at 05:57

## 2019-12-29 RX ADMIN — SIMVASTATIN 40 MILLIGRAM(S): 20 TABLET, FILM COATED ORAL at 21:51

## 2019-12-29 RX ADMIN — CLOPIDOGREL BISULFATE 75 MILLIGRAM(S): 75 TABLET, FILM COATED ORAL at 12:08

## 2019-12-29 RX ADMIN — ALBUTEROL 2 PUFF(S): 90 AEROSOL, METERED ORAL at 14:08

## 2019-12-29 RX ADMIN — CARVEDILOL PHOSPHATE 6.25 MILLIGRAM(S): 80 CAPSULE, EXTENDED RELEASE ORAL at 05:57

## 2019-12-29 RX ADMIN — PANTOPRAZOLE SODIUM 40 MILLIGRAM(S): 20 TABLET, DELAYED RELEASE ORAL at 05:56

## 2019-12-29 RX ADMIN — GABAPENTIN 300 MILLIGRAM(S): 400 CAPSULE ORAL at 21:51

## 2019-12-29 RX ADMIN — Medication 0.5 MILLIGRAM(S): at 18:19

## 2019-12-29 RX ADMIN — Medication 500 MILLIGRAM(S): at 12:08

## 2019-12-29 RX ADMIN — TRAMADOL HYDROCHLORIDE 25 MILLIGRAM(S): 50 TABLET ORAL at 09:11

## 2019-12-29 RX ADMIN — ALBUTEROL 2 PUFF(S): 90 AEROSOL, METERED ORAL at 05:57

## 2019-12-29 RX ADMIN — TRAMADOL HYDROCHLORIDE 25 MILLIGRAM(S): 50 TABLET ORAL at 09:45

## 2019-12-29 RX ADMIN — Medication 1 MILLIGRAM(S): at 12:09

## 2019-12-29 RX ADMIN — SIMVASTATIN 40 MILLIGRAM(S): 20 TABLET, FILM COATED ORAL at 00:02

## 2019-12-29 RX ADMIN — TRAMADOL HYDROCHLORIDE 25 MILLIGRAM(S): 50 TABLET ORAL at 00:45

## 2019-12-29 RX ADMIN — Medication 10 MILLIGRAM(S): at 00:03

## 2019-12-29 NOTE — PROGRESS NOTE ADULT - ASSESSMENT
41 y/o M pt with multiple co morbid conditions including ESRD on hemodialysis 3 x weekly, COPD on home oxygen at baseline, obesity, HTN, DM2, migraine HA, schizophrenia and bipolar disorder, came for eval of AVF due to decreased thrill, then during ED stay RN who found patient slumped over in bed requiring sternal rub.  at time of evaluation. vitals stable. admitted for near syncope work up  Patient receives HD at [facility] via [facility] MWF, missed Chidi holiday scheduled HD

## 2019-12-29 NOTE — PROGRESS NOTE ADULT - SUBJECTIVE AND OBJECTIVE BOX
PRESTNO JOHNSON  39y Male  MRN:[id number]    Patient is a 39y old  Male who presents with a chief complaint of presyncope (24 Dec 2019 11:58)    HPI:  39 y/o M pt with multiple co morbid conditions including ESRD on hemodialysis 3 x weekly, COPD on home oxygen at baseline, obesity, HTN, DM2, migraine HA, schizophrenia and bipolar disorder, came for eval of AVF due to decreased thrill, then during ED stay RN who found patient slumped over in bed requiring sternal rub.  at time of evaluation. vitals stable. admitted for near syncope work up  Patient receives HD at [facility] via [facility] MWF, missed Sunday holiday scheduled HD (23 Dec 2019 13:24)      Patient seen and evaluated at bedside. No acute events overnight except as noted.    Interval HPI: no acute events o/n    PAST MEDICAL & SURGICAL HISTORY:  COPD (chronic obstructive pulmonary disease)  Migraine  HTN (hypertension)  DM (diabetes mellitus)  Bipolar disorder  Schizophrenia  ESRD on dialysis  Morbid obesity with BMI of 40.0-44.9, adult  H/O hernia repair      REVIEW OF SYSTEMS:  as per hpi    VITALS:  Vital Signs Last 24 Hrs  T(C): 36.9 (29 Dec 2019 12:11), Max: 37.3 (29 Dec 2019 04:35)  T(F): 98.5 (29 Dec 2019 12:11), Max: 99.2 (29 Dec 2019 04:35)  HR: 79 (29 Dec 2019 12:29) (79 - 89)  BP: 103/58 (29 Dec 2019 12:11) (103/58 - 132/68)  BP(mean): --  RR: 18 (29 Dec 2019 12:11) (16 - 19)  SpO2: 96% (29 Dec 2019 12:29) (95% - 98%)      PHYSICAL EXAM:  GENERAL: NAD, well-developed, obese, lethargic but arousable   HEAD:  Atraumatic, Normocephalic  EYES: EOMI, PERRLA, conjunctiva and sclera clear  NECK: Supple, No JVD  CHEST/LUNG: Clear to auscultation bilaterally; No wheeze  HEART: S1, S2; No murmurs, rubs, or gallops  ABDOMEN: Soft, Nontender, Nondistended; Bowel sounds present  EXTREMITIES:  2+ Peripheral Pulses, No clubbing, cyanosis,+  edema  PSYCH: Normal affect     Consultant(s) Notes Reviewed:  [x ] YES  [ ] NO  Care Discussed with Consultants/Other Providers [ x] YES  [ ] NO    MEDS:  MEDICATIONS  (STANDING):  ALBUTerol    90 MICROgram(s) HFA Inhaler 2 Puff(s) Inhalation every 6 hours  ascorbic acid 500 milliGRAM(s) Oral daily  carvedilol 6.25 milliGRAM(s) Oral every 12 hours  chlorhexidine 4% Liquid 1 Application(s) Topical once  clonazePAM  Tablet 0.5 milliGRAM(s) Oral every 12 hours  clopidogrel Tablet 75 milliGRAM(s) Oral daily  dextrose 5%. 1000 milliLiter(s) (50 mL/Hr) IV Continuous <Continuous>  dextrose 50% Injectable 12.5 Gram(s) IV Push once  dextrose 50% Injectable 25 Gram(s) IV Push once  dextrose 50% Injectable 25 Gram(s) IV Push once  epoetin cyndie Injectable 49690 Unit(s) IV Push <User Schedule>  ferrous    sulfate 325 milliGRAM(s) Oral daily  folic acid 1 milliGRAM(s) Oral daily  furosemide    Tablet 80 milliGRAM(s) Oral daily  gabapentin 300 milliGRAM(s) Oral at bedtime  heparin  Injectable 5000 Unit(s) SubCutaneous every 8 hours  hydrALAZINE 10 milliGRAM(s) Oral three times a day  insulin lispro (HumaLOG) corrective regimen sliding scale   SubCutaneous three times a day before meals  isosorbide   mononitrate ER Tablet (IMDUR) 30 milliGRAM(s) Oral daily  mirtazapine 7.5 milliGRAM(s) Oral at bedtime  pantoprazole    Tablet 40 milliGRAM(s) Oral before breakfast  PARoxetine 20 milliGRAM(s) Oral daily  risperiDONE   Tablet 1 milliGRAM(s) Oral every 12 hours  sevelamer carbonate 2400 milliGRAM(s) Oral three times a day with meals  simvastatin 40 milliGRAM(s) Oral at bedtime    MEDICATIONS  (PRN):  dextrose 40% Gel 15 Gram(s) Oral once PRN Blood Glucose LESS THAN 70 milliGRAM(s)/deciliter  glucagon  Injectable 1 milliGRAM(s) IntraMuscular once PRN Glucose LESS THAN 70 milligrams/deciliter  methocarbamol 500 milliGRAM(s) Oral every 12 hours PRN spasms  traMADol 25 milliGRAM(s) Oral every 8 hours PRN Severe Pain (7 - 10)      ALLERGIES:  aspirin (Swelling)  fish (Unknown)  penicillins (Swelling)  shellfish (Unknown)      LABS:                                           8.8    8.03  )-----------( 400      ( 29 Dec 2019 09:19 )             30.6   12-29    133<L>  |  92<L>  |  23  ----------------------------<  107<H>  4.0   |  28  |  6.08<H>    Ca    10.5      29 Dec 2019 06:02               < from: CT Head No Cont (12.23.19 @ 18:03) >    IMPRESSION: Unremarkable noncontrast CT of the brain. No change from 2/21/2019.        < end of copied text >

## 2019-12-29 NOTE — PROGRESS NOTE ADULT - ASSESSMENT
39 yo male extensive pmhx as noted above including esrd on hd, copd on o2, obesity, htn, dm, migraine, schizo, bipolar, a/w presyncope and fluid overload    fluid overload  renal consulted  plan for HD as per renal      presyncope   CT head noted   check echo - ok to do outpt  cards consulted    DM  insulin as per endo    copd/alice  on home o2  pulm following  cpap at night. pt refuses/ non compliant     cont all prior home meds    dvt ppx    PT           Advanced care planning was discussed with patient and family.  Advanced care planning forms were reviewed and discussed.  Differential diagnosis and plan of care discussed with patient after the evaluation.   Pain assessed and judicious use of narcotics when appropriate was discussed.  Importance of Fall prevention discussed.  Counseling on Smoking and Alcohol cessation was offered when appropriate.  Counseling on Diet, exercise, and medication compliance was done.

## 2019-12-29 NOTE — PROGRESS NOTE ADULT - SUBJECTIVE AND OBJECTIVE BOX
[facility] - HUSSAIN Sanchez / HUSSAIN Villalobos / ERNESTO Mehta/ HUSSAIN Mauricio/ HUSSAIN Barber/ IVONNE Hui / JADEN La / BRANDON Alexander  ---------------------------------------------------------------------------------------------------------------  seen and examined today for ESRD  Interval : NAD  VITALS:  T(F): 99.2 (12-29-19 @ 04:35), Max: 99.2 (12-29-19 @ 04:35)  HR: 88 (12-29-19 @ 07:51)  BP: 112/62 (12-29-19 @ 04:35)  RR: 19 (12-29-19 @ 04:35)  SpO2: 96% (12-29-19 @ 07:51)  Wt(kg): --    12-28 @ 07:01  -  12-29 @ 07:00  --------------------------------------------------------  IN: 1360 mL / OUT: 7000 mL / NET: -5640 mL      Physical Exam :-  Constitutional: NAD, morbid obesity  Neck: Supple.  Respiratory: Bilateral equal breath sounds,  Cardiovascular: S1, S2 normal,  Gastrointestinal: Bowel Sounds present, soft, non tender.  Extremities: 3+ edema  Neurological: Alert and Oriented x 3, no focal deficits  Psychiatric: Normal mood, normal affect  Data:-  Allergies :   aspirin (Swelling)  fish (Unknown)  penicillins (Swelling)  shellfish (Unknown)    Hospital Medications:   MEDICATIONS  (STANDING):  ALBUTerol    90 MICROgram(s) HFA Inhaler 2 Puff(s) Inhalation every 6 hours  ascorbic acid 500 milliGRAM(s) Oral daily  carvedilol 6.25 milliGRAM(s) Oral every 12 hours  chlorhexidine 4% Liquid 1 Application(s) Topical once  clonazePAM  Tablet 0.5 milliGRAM(s) Oral every 12 hours  clopidogrel Tablet 75 milliGRAM(s) Oral daily  dextrose 5%. 1000 milliLiter(s) (50 mL/Hr) IV Continuous <Continuous>  dextrose 50% Injectable 12.5 Gram(s) IV Push once  dextrose 50% Injectable 25 Gram(s) IV Push once  dextrose 50% Injectable 25 Gram(s) IV Push once  epoetin cyndie Injectable 23138 Unit(s) IV Push <User Schedule>  ferrous    sulfate 325 milliGRAM(s) Oral daily  folic acid 1 milliGRAM(s) Oral daily  furosemide    Tablet 80 milliGRAM(s) Oral daily  gabapentin 300 milliGRAM(s) Oral at bedtime  heparin  Injectable 5000 Unit(s) SubCutaneous every 8 hours  hydrALAZINE 10 milliGRAM(s) Oral three times a day  insulin lispro (HumaLOG) corrective regimen sliding scale   SubCutaneous three times a day before meals  isosorbide   mononitrate ER Tablet (IMDUR) 30 milliGRAM(s) Oral daily  mirtazapine 7.5 milliGRAM(s) Oral at bedtime  pantoprazole    Tablet 40 milliGRAM(s) Oral before breakfast  PARoxetine 20 milliGRAM(s) Oral daily  risperiDONE   Tablet 1 milliGRAM(s) Oral every 12 hours  sevelamer carbonate 2400 milliGRAM(s) Oral three times a day with meals  simvastatin 40 milliGRAM(s) Oral at bedtime    12-29    133<L>  |  92<L>  |  23  ----------------------------<  107<H>  4.0   |  28  |  6.08<H>    Ca    10.5      29 Dec 2019 06:02      Creatinine Trend: 6.08 <--, 10.61 <--, 8.87 <--, 8.01 <--, 10.97 <--, 10.28 <--                        8.8    8.03  )-----------( 400      ( 29 Dec 2019 09:19 )             30.6

## 2019-12-29 NOTE — PROGRESS NOTE ADULT - SUBJECTIVE AND OBJECTIVE BOX
Subjective: Patient seen and examined. No new events except as noted.   no cp or sob       REVIEW OF SYSTEMS:    CONSTITUTIONAL: +weakness, fevers or chills  EYES/ENT: No visual changes;  No vertigo or throat pain   NECK: No pain or stiffness  RESPIRATORY: No cough, wheezing, hemoptysis; No shortness of breath  CARDIOVASCULAR: No chest pain or palpitations  GASTROINTESTINAL: No abdominal or epigastric pain. No nausea, vomiting, or hematemesis; No diarrhea or constipation. No melena or hematochezia.  GENITOURINARY: No dysuria, frequency or hematuria  NEUROLOGICAL: No numbness or weakness  SKIN: No itching, burning, rashes, or lesions   All other review of systems is negative unless indicated above.    MEDICATIONS:  MEDICATIONS  (STANDING):  ALBUTerol    90 MICROgram(s) HFA Inhaler 2 Puff(s) Inhalation every 6 hours  ascorbic acid 500 milliGRAM(s) Oral daily  carvedilol 6.25 milliGRAM(s) Oral every 12 hours  chlorhexidine 4% Liquid 1 Application(s) Topical once  clonazePAM  Tablet 0.5 milliGRAM(s) Oral every 12 hours  clopidogrel Tablet 75 milliGRAM(s) Oral daily  dextrose 5%. 1000 milliLiter(s) (50 mL/Hr) IV Continuous <Continuous>  dextrose 50% Injectable 12.5 Gram(s) IV Push once  dextrose 50% Injectable 25 Gram(s) IV Push once  dextrose 50% Injectable 25 Gram(s) IV Push once  epoetin cyndie Injectable 77386 Unit(s) IV Push <User Schedule>  ferrous    sulfate 325 milliGRAM(s) Oral daily  folic acid 1 milliGRAM(s) Oral daily  furosemide    Tablet 80 milliGRAM(s) Oral daily  gabapentin 300 milliGRAM(s) Oral at bedtime  heparin  Injectable 5000 Unit(s) SubCutaneous every 8 hours  hydrALAZINE 10 milliGRAM(s) Oral three times a day  insulin lispro (HumaLOG) corrective regimen sliding scale   SubCutaneous three times a day before meals  isosorbide   mononitrate ER Tablet (IMDUR) 30 milliGRAM(s) Oral daily  mirtazapine 7.5 milliGRAM(s) Oral at bedtime  pantoprazole    Tablet 40 milliGRAM(s) Oral before breakfast  PARoxetine 20 milliGRAM(s) Oral daily  risperiDONE   Tablet 1 milliGRAM(s) Oral every 12 hours  sevelamer carbonate 2400 milliGRAM(s) Oral three times a day with meals  simvastatin 40 milliGRAM(s) Oral at bedtime      PHYSICAL EXAM:  T(C): 37.3 (12-29-19 @ 04:35), Max: 37.3 (12-29-19 @ 04:35)  HR: 88 (12-29-19 @ 07:51) (79 - 89)  BP: 112/62 (12-29-19 @ 04:35) (112/62 - 132/68)  RR: 19 (12-29-19 @ 04:35) (16 - 19)  SpO2: 96% (12-29-19 @ 07:51) (95% - 98%)  Wt(kg): --  I&O's Summary    28 Dec 2019 07:01  -  29 Dec 2019 07:00  --------------------------------------------------------  IN: 1360 mL / OUT: 7000 mL / NET: -5640 mL                Appearance: NAD morbidly obese   HEENT:   Normal oral mucosa, PERRL, EOMI	  Lymphatic: No lymphadenopathy , no edema  Cardiovascular: Normal S1 S2, No JVD, No murmurs , Peripheral pulses palpable 2+ bilaterally  Respiratory: Lungs clear to auscultation, normal effort 	  Gastrointestinal:  Soft, Non-tender, + BS	  Skin: No rashes, No ecchymoses, No cyanosis, warm to touch  Musculoskeletal: Normal range of motion, normal strength  Psychiatry:  Mood & affect appropriate  Ext: No edema. LUE AV fistula + thrill       LABS:    CARDIAC MARKERS:                                8.8    8.03  )-----------( 400      ( 29 Dec 2019 09:19 )             30.6     12-29    133<L>  |  92<L>  |  23  ----------------------------<  107<H>  4.0   |  28  |  6.08<H>    Ca    10.5      29 Dec 2019 06:02      proBNP:   Lipid Profile:   HgA1c:   TSH:             TELEMETRY: 	  SR  ECG:  	  RADIOLOGY:    DIAGNOSTIC TESTING:  [ ] Echocardiogram:  [ ]  Catheterization:  [ ] Stress Test:    OTHER:

## 2019-12-30 LAB
GLUCOSE BLDC GLUCOMTR-MCNC: 110 MG/DL — HIGH (ref 70–99)
GLUCOSE BLDC GLUCOMTR-MCNC: 113 MG/DL — HIGH (ref 70–99)
GLUCOSE BLDC GLUCOMTR-MCNC: 116 MG/DL — HIGH (ref 70–99)
GLUCOSE BLDC GLUCOMTR-MCNC: 87 MG/DL — SIGNIFICANT CHANGE UP (ref 70–99)

## 2019-12-30 PROCEDURE — 99223 1ST HOSP IP/OBS HIGH 75: CPT

## 2019-12-30 RX ORDER — HYDROMORPHONE HYDROCHLORIDE 2 MG/ML
1 INJECTION INTRAMUSCULAR; INTRAVENOUS; SUBCUTANEOUS ONCE
Refills: 0 | Status: DISCONTINUED | OUTPATIENT
Start: 2019-12-30 | End: 2019-12-30

## 2019-12-30 RX ORDER — HYDROMORPHONE HYDROCHLORIDE 2 MG/ML
2 INJECTION INTRAMUSCULAR; INTRAVENOUS; SUBCUTANEOUS ONCE
Refills: 0 | Status: DISCONTINUED | OUTPATIENT
Start: 2019-12-30 | End: 2019-12-30

## 2019-12-30 RX ADMIN — Medication 20 MILLIGRAM(S): at 11:44

## 2019-12-30 RX ADMIN — RISPERIDONE 1 MILLIGRAM(S): 4 TABLET ORAL at 05:54

## 2019-12-30 RX ADMIN — MIRTAZAPINE 7.5 MILLIGRAM(S): 45 TABLET, ORALLY DISINTEGRATING ORAL at 21:21

## 2019-12-30 RX ADMIN — SEVELAMER CARBONATE 2400 MILLIGRAM(S): 2400 POWDER, FOR SUSPENSION ORAL at 11:44

## 2019-12-30 RX ADMIN — Medication 10 MILLIGRAM(S): at 15:36

## 2019-12-30 RX ADMIN — ISOSORBIDE MONONITRATE 30 MILLIGRAM(S): 60 TABLET, EXTENDED RELEASE ORAL at 15:36

## 2019-12-30 RX ADMIN — HYDROMORPHONE HYDROCHLORIDE 1 MILLIGRAM(S): 2 INJECTION INTRAMUSCULAR; INTRAVENOUS; SUBCUTANEOUS at 21:20

## 2019-12-30 RX ADMIN — HEPARIN SODIUM 5000 UNIT(S): 5000 INJECTION INTRAVENOUS; SUBCUTANEOUS at 21:20

## 2019-12-30 RX ADMIN — HEPARIN SODIUM 5000 UNIT(S): 5000 INJECTION INTRAVENOUS; SUBCUTANEOUS at 15:36

## 2019-12-30 RX ADMIN — ALBUTEROL 2 PUFF(S): 90 AEROSOL, METERED ORAL at 11:45

## 2019-12-30 RX ADMIN — PANTOPRAZOLE SODIUM 40 MILLIGRAM(S): 20 TABLET, DELAYED RELEASE ORAL at 05:53

## 2019-12-30 RX ADMIN — SIMVASTATIN 40 MILLIGRAM(S): 20 TABLET, FILM COATED ORAL at 21:21

## 2019-12-30 RX ADMIN — TRAMADOL HYDROCHLORIDE 25 MILLIGRAM(S): 50 TABLET ORAL at 09:45

## 2019-12-30 RX ADMIN — RISPERIDONE 1 MILLIGRAM(S): 4 TABLET ORAL at 17:05

## 2019-12-30 RX ADMIN — HYDROMORPHONE HYDROCHLORIDE 2 MILLIGRAM(S): 2 INJECTION INTRAMUSCULAR; INTRAVENOUS; SUBCUTANEOUS at 16:06

## 2019-12-30 RX ADMIN — ALBUTEROL 2 PUFF(S): 90 AEROSOL, METERED ORAL at 17:07

## 2019-12-30 RX ADMIN — Medication 500 MILLIGRAM(S): at 11:44

## 2019-12-30 RX ADMIN — Medication 0.5 MILLIGRAM(S): at 17:05

## 2019-12-30 RX ADMIN — HYDROMORPHONE HYDROCHLORIDE 2 MILLIGRAM(S): 2 INJECTION INTRAMUSCULAR; INTRAVENOUS; SUBCUTANEOUS at 15:36

## 2019-12-30 RX ADMIN — Medication 1 MILLIGRAM(S): at 11:44

## 2019-12-30 RX ADMIN — GABAPENTIN 300 MILLIGRAM(S): 400 CAPSULE ORAL at 21:21

## 2019-12-30 RX ADMIN — SEVELAMER CARBONATE 2400 MILLIGRAM(S): 2400 POWDER, FOR SUSPENSION ORAL at 17:05

## 2019-12-30 RX ADMIN — CLOPIDOGREL BISULFATE 75 MILLIGRAM(S): 75 TABLET, FILM COATED ORAL at 11:44

## 2019-12-30 RX ADMIN — ALBUTEROL 2 PUFF(S): 90 AEROSOL, METERED ORAL at 05:54

## 2019-12-30 RX ADMIN — ALBUTEROL 2 PUFF(S): 90 AEROSOL, METERED ORAL at 23:40

## 2019-12-30 RX ADMIN — METHOCARBAMOL 500 MILLIGRAM(S): 500 TABLET, FILM COATED ORAL at 12:40

## 2019-12-30 RX ADMIN — Medication 10 MILLIGRAM(S): at 21:21

## 2019-12-30 RX ADMIN — HYDROMORPHONE HYDROCHLORIDE 1 MILLIGRAM(S): 2 INJECTION INTRAMUSCULAR; INTRAVENOUS; SUBCUTANEOUS at 21:55

## 2019-12-30 RX ADMIN — HEPARIN SODIUM 5000 UNIT(S): 5000 INJECTION INTRAVENOUS; SUBCUTANEOUS at 05:53

## 2019-12-30 RX ADMIN — CARVEDILOL PHOSPHATE 6.25 MILLIGRAM(S): 80 CAPSULE, EXTENDED RELEASE ORAL at 17:05

## 2019-12-30 RX ADMIN — SEVELAMER CARBONATE 2400 MILLIGRAM(S): 2400 POWDER, FOR SUSPENSION ORAL at 09:00

## 2019-12-30 RX ADMIN — TRAMADOL HYDROCHLORIDE 25 MILLIGRAM(S): 50 TABLET ORAL at 08:59

## 2019-12-30 NOTE — CONSULT NOTE ADULT - SUBJECTIVE AND OBJECTIVE BOX
[facility] Vascular Surgical Consultation  --------------------------------------------------------------------------------------------      HPI:   39 yr old male with PMH of ESRD on HD (M/W/F), COPD on home oxygen at baseline, obesity, HTN, DM2, migraine HA, schizophrenia and bipolar disorder, came for eval of AVF due to decreased thrill and clotting during todays HD session. Pt states he has had issues with the fistula for several months, previously managed as outpatient by vascular surgery. Denies any f/c, N/V, SOB/CP, lightheadedness/HA.       ROS: 10-system review is otherwise negative except HPI above.      PAST MEDICAL & SURGICAL HISTORY:  COPD (chronic obstructive pulmonary disease)  Migraine  HTN (hypertension)  DM (diabetes mellitus)  Bipolar disorder  Schizophrenia  ESRD on dialysis  Morbid obesity with BMI of 40.0-44.9, adult  H/O hernia repair    FAMILY HISTORY:  Family history of COPD (chronic obstructive pulmonary disease)  Family history of diabetes mellitus      ALLERGIES: aspirin (Swelling)  fish (Unknown)  penicillins (Swelling)  shellfish (Unknown)      HOME MEDICATIONS:    ascorbic acid 500 mg oral tablet: 1 tab(s) orally once a day (27 Dec 2019 13:50)  carvedilol 6.25 mg oral tablet: 1 tab(s) orally every 12 hours (27 Dec 2019 13:50)  clonazePAM 0.5 mg oral tablet: 1 tab(s) orally every 12 hours (27 Dec 2019 13:50)  clopidogrel 75 mg oral tablet: 1 tab(s) orally once a day (27 Dec 2019 13:50)  ferrous sulfate 325 mg (65 mg elemental iron) oral delayed release tablet: 1 tab(s) orally once a day (27 Dec 2019 13:50)  folic acid 1 mg oral tablet: 1 tab(s) orally once a day (27 Dec 2019 13:50)  furosemide 80 mg oral tablet: 1 tab(s) orally once a day (27 Dec 2019 13:50)  gabapentin 300 mg oral capsule: 1 cap(s) orally once a day (at bedtime) (27 Dec 2019 13:50)  hydrALAZINE 50 mg oral tablet: 1 tab(s) orally 3 times a day (27 Dec 2019 13:50)  isosorbide mononitrate 30 mg oral tablet, extended release: 1 tab(s) orally once a day (27 Dec 2019 13:50)  mirtazapine 7.5 mg oral tablet: 1 tab(s) orally once a day (at bedtime)  NOTE: last dispensed May 2019 (23 Dec 2019 18:05)  pantoprazole 40 mg oral delayed release tablet: 1 tab(s) orally once a day (before a meal) (27 Dec 2019 13:50)  PARoxetine 20 mg oral tablet: 1 tab(s) orally once a day (27 Dec 2019 13:50)  Pataday 0.2% ophthalmic solution: 1 drop(s) in each eye once a day (23 Dec 2019 18:05)  Restasis 0.05% ophthalmic emulsion: 1 drop(s) in each eye once a day (23 Dec 2019 18:05)  sevelamer carbonate 800 mg oral tablet: 3 tab(s) orally 3 times a day (with meals) (27 Dec 2019 13:50)  simvastatin 40 mg oral tablet: 1 tab(s) orally once a day (at bedtime) (27 Dec 2019 13:50)  Symbicort 160 mcg-4.5 mcg/inh inhalation aerosol: 2 puff(s) inhaled 2 times a day (23 Dec 2019 18:05)  traMADol 50 mg oral tablet: 0.5 tab(s) orally every 8 hours, As needed, Severe Pain (7 - 10) (27 Dec 2019 13:50)      CURRENT MEDICATIONS  MEDICATIONS (STANDING): ALBUTerol    90 MICROgram(s) HFA Inhaler 2 Puff(s) Inhalation every 6 hours  ascorbic acid 500 milliGRAM(s) Oral daily  carvedilol 6.25 milliGRAM(s) Oral every 12 hours  clopidogrel Tablet 75 milliGRAM(s) Oral daily  dextrose 5%. 1000 milliLiter(s) IV Continuous <Continuous>  dextrose 50% Injectable 12.5 Gram(s) IV Push once  dextrose 50% Injectable 25 Gram(s) IV Push once  dextrose 50% Injectable 25 Gram(s) IV Push once  epoetin cyndie Injectable 21522 Unit(s) IV Push <User Schedule>  ferrous    sulfate 325 milliGRAM(s) Oral daily  folic acid 1 milliGRAM(s) Oral daily  furosemide    Tablet 80 milliGRAM(s) Oral daily  gabapentin 300 milliGRAM(s) Oral at bedtime  heparin  Injectable 5000 Unit(s) SubCutaneous every 8 hours  hydrALAZINE 10 milliGRAM(s) Oral three times a day  insulin lispro (HumaLOG) corrective regimen sliding scale   SubCutaneous three times a day before meals  isosorbide   mononitrate ER Tablet (IMDUR) 30 milliGRAM(s) Oral daily  mirtazapine 7.5 milliGRAM(s) Oral at bedtime  pantoprazole    Tablet 40 milliGRAM(s) Oral before breakfast  PARoxetine 20 milliGRAM(s) Oral daily  risperiDONE   Tablet 1 milliGRAM(s) Oral every 12 hours  sevelamer carbonate 2400 milliGRAM(s) Oral three times a day with meals  simvastatin 40 milliGRAM(s) Oral at bedtime    MEDICATIONS (PRN):dextrose 40% Gel 15 Gram(s) Oral once PRN Blood Glucose LESS THAN 70 milliGRAM(s)/deciliter  glucagon  Injectable 1 milliGRAM(s) IntraMuscular once PRN Glucose LESS THAN 70 milligrams/deciliter  methocarbamol 500 milliGRAM(s) Oral every 12 hours PRN spasms  traMADol 25 milliGRAM(s) Oral every 8 hours PRN Severe Pain (7 - 10)    --------------------------------------------------------------------------------------------    Vitals:   T(C): 36.9 (12-30-19 @ 14:30), Max: 37.3 (12-30-19 @ 04:10)  HR: 90 (12-30-19 @ 17:07) (76 - 93)  BP: 124/66 (12-30-19 @ 17:07) (110/64 - 136/71)  RR: 18 (12-30-19 @ 14:30) (17 - 18)  SpO2: 98% (12-30-19 @ 14:30) (93% - 98%)  CAPILLARY BLOOD GLUCOSE      POCT Blood Glucose.: 116 mg/dL (30 Dec 2019 16:50)  POCT Blood Glucose.: 113 mg/dL (30 Dec 2019 11:33)  POCT Blood Glucose.: 87 mg/dL (30 Dec 2019 07:52)  POCT Blood Glucose.: 102 mg/dL (29 Dec 2019 21:24)    CAPILLARY BLOOD GLUCOSE      POCT Blood Glucose.: 116 mg/dL (30 Dec 2019 16:50)  POCT Blood Glucose.: 113 mg/dL (30 Dec 2019 11:33)  POCT Blood Glucose.: 87 mg/dL (30 Dec 2019 07:52)  POCT Blood Glucose.: 102 mg/dL (29 Dec 2019 21:24)      12-29 @ 07:01  -  12-30 @ 07:00  --------------------------------------------------------  IN:    Oral Fluid: 120 mL  Total IN: 120 mL    OUT:  Total OUT: 0 mL    Total NET: 120 mL      12-30 @ 07:01  -  12-30 @ 20:14  --------------------------------------------------------  IN:    Oral Fluid: 960 mL    Other: 600 mL  Total IN: 1560 mL    OUT:    Other: 587 mL  Total OUT: 587 mL    Total NET: 973 mL        Height (cm): 200.7 (12-24 @ 08:29)  Weight (kg): 199.4 (12-24 @ 08:29)  BMI (kg/m2): 49.5 (12-24 @ 08:29)  BSA (m2): 3.18 (12-24 @ 08:29)    PHYSICAL EXAM:     General: Morbidly obese male, in no acute distress   Neurologic: GCS 15, AAOx3  Respiratory: Normal respiratory effort.   CVS: RRR  Abdomen: Abdomen obese, soft, NT/ND   Ext: Normal ROM all 4 extremities, LUE with upper arm AVF noted, b/l LE with brawny induration  Vascular: 2+ peripheral pulses b/l UE, nonpalpable LE pulses but extremities warm. LUE AVF with palpable thrill in mid and distal fistula, nonpalpable proximally.   Skin: Warm, dry, intact  --------------------------------------------------------------------------------------------    LABS  CBC (12-29 @ 09:19)                              8.8<L>                         8.03    )----------------(  400        --    % Neutrophils, --    % Lymphocytes, ANC: --                                  30.6<L>                --------------------------------------------------------------------------------------------    MICROBIOLOGY      --------------------------------------------------------------------------------------------    IMAGING

## 2019-12-30 NOTE — PROGRESS NOTE ADULT - ASSESSMENT
39 yo male extensive pmhx as noted above including esrd on hd, copd on o2, obesity, htn, dm, migraine, schizo, bipolar, a/w presyncope and fluid overload    fluid overload  renal consulted  plan for HD as per renal      clotted avf  vasc eval noted  f/u dopplers  possible fistulogram    presyncope   CT head noted   check echo - ok to do outpt  cards consulted    DM  insulin as per endo    copd/alice  on home o2  pulm following  cpap at night. pt refuses/ non compliant     cont all prior home meds    dvt ppx    PT           Advanced care planning was discussed with patient and family.  Advanced care planning forms were reviewed and discussed.  Differential diagnosis and plan of care discussed with patient after the evaluation.   Pain assessed and judicious use of narcotics when appropriate was discussed.  Importance of Fall prevention discussed.  Counseling on Smoking and Alcohol cessation was offered when appropriate.  Counseling on Diet, exercise, and medication compliance was done.

## 2019-12-30 NOTE — PROGRESS NOTE ADULT - SUBJECTIVE AND OBJECTIVE BOX
[facility] - HUSSAIN Sanchez / HUSSAIN Villalobos / ERNESTO Mehta/ HUSSAIN Mauricio/ HUSSAIN Barber/ IVONNE Hui / JADEN La / BRANDON Alexander  ---------------------------------------------------------------------------------------------------------------  seen and examined today for ESRD on HD  Interval : more awake and alert today  VITALS:  T(F): 99.1 (12-30-19 @ 04:10), Max: 99.1 (12-30-19 @ 04:10)  HR: 79 (12-30-19 @ 10:12)  BP: 123/64 (12-30-19 @ 04:10)  RR: 18 (12-30-19 @ 04:10)  SpO2: 93% (12-30-19 @ 10:12)  Wt(kg): --    12-29 @ 07:01  -  12-30 @ 07:00  --------------------------------------------------------  IN: 120 mL / OUT: 0 mL / NET: 120 mL    12-30 @ 07:01  -  12-30 @ 10:33  --------------------------------------------------------  IN: 360 mL / OUT: 0 mL / NET: 360 mL      Physical Exam :-  Constitutional: NAD  Neck: Supple.  Respiratory: Bilateral equal breath sounds,  Cardiovascular: S1, S2 normal,  Gastrointestinal: Bowel Sounds present, soft, non tender.  Extremities: 3+ edema  Neurological: Alert and Oriented x 3, no focal deficits  Psychiatric: Normal mood, normal affect  Data:-  Allergies :   aspirin (Swelling)  fish (Unknown)  penicillins (Swelling)  shellfish (Unknown)    Hospital Medications:   MEDICATIONS  (STANDING):  ALBUTerol    90 MICROgram(s) HFA Inhaler 2 Puff(s) Inhalation every 6 hours  ascorbic acid 500 milliGRAM(s) Oral daily  carvedilol 6.25 milliGRAM(s) Oral every 12 hours  chlorhexidine 4% Liquid 1 Application(s) Topical once  clonazePAM  Tablet 0.5 milliGRAM(s) Oral every 12 hours  clopidogrel Tablet 75 milliGRAM(s) Oral daily  dextrose 5%. 1000 milliLiter(s) (50 mL/Hr) IV Continuous <Continuous>  dextrose 50% Injectable 12.5 Gram(s) IV Push once  dextrose 50% Injectable 25 Gram(s) IV Push once  dextrose 50% Injectable 25 Gram(s) IV Push once  epoetin cyndie Injectable 56589 Unit(s) IV Push <User Schedule>  ferrous    sulfate 325 milliGRAM(s) Oral daily  folic acid 1 milliGRAM(s) Oral daily  furosemide    Tablet 80 milliGRAM(s) Oral daily  gabapentin 300 milliGRAM(s) Oral at bedtime  heparin  Injectable 5000 Unit(s) SubCutaneous every 8 hours  hydrALAZINE 10 milliGRAM(s) Oral three times a day  insulin lispro (HumaLOG) corrective regimen sliding scale   SubCutaneous three times a day before meals  isosorbide   mononitrate ER Tablet (IMDUR) 30 milliGRAM(s) Oral daily  mirtazapine 7.5 milliGRAM(s) Oral at bedtime  pantoprazole    Tablet 40 milliGRAM(s) Oral before breakfast  PARoxetine 20 milliGRAM(s) Oral daily  risperiDONE   Tablet 1 milliGRAM(s) Oral every 12 hours  sevelamer carbonate 2400 milliGRAM(s) Oral three times a day with meals  simvastatin 40 milliGRAM(s) Oral at bedtime    12-29    133<L>  |  92<L>  |  23  ----------------------------<  107<H>  4.0   |  28  |  6.08<H>    Ca    10.5      29 Dec 2019 06:02      Creatinine Trend: 6.08 <--, 10.61 <--, 8.87 <--, 8.01 <--, 10.97 <--                        8.8    8.03  )-----------( 400      ( 29 Dec 2019 09:19 )             30.6

## 2019-12-30 NOTE — PROGRESS NOTE ADULT - ASSESSMENT
ACute on chronic hypercapneic respiratory failure  Morbid obesity: r/o sleep apnea, obesity h ypoventilation  Acute on chronci HfPef  Pulmonary edme  ESRD with anasarca, fluid overload    REC    DC planning for GREER  Diuretics per primary team/cardiology

## 2019-12-30 NOTE — PROGRESS NOTE ADULT - ASSESSMENT
39 y/o M pt with multiple co morbid conditions including ESRD on hemodialysis 3 x weekly, COPD on home oxygen at baseline, obesity, HTN, DM2, migraine HA, schizophrenia and bipolar disorder, came for eval of AVF due to decreased thrill, then during ED stay RN who found patient slumped over in bed requiring sternal rub.  at time of evaluation. vitals stable. admitted for near syncope work up  Patient receives HD at [facility] via [facility]F MWF, missed Chidi holiday scheduled HD

## 2019-12-30 NOTE — PROGRESS NOTE ADULT - SUBJECTIVE AND OBJECTIVE BOX
PRESTON JOHNSON  39y Male  MRN:[id number]    Patient is a 39y old  Male who presents with a chief complaint of presyncope (24 Dec 2019 11:58)    HPI:  41 y/o M pt with multiple co morbid conditions including ESRD on hemodialysis 3 x weekly, COPD on home oxygen at baseline, obesity, HTN, DM2, migraine HA, schizophrenia and bipolar disorder, came for eval of AVF due to decreased thrill, then during ED stay RN who found patient slumped over in bed requiring sternal rub.  at time of evaluation. vitals stable. admitted for near syncope work up  Patient receives HD at [facility] via E AVF MWF, missed Sunday holiday scheduled HD (23 Dec 2019 13:24)      Patient seen and evaluated at bedside. No acute events overnight except as noted.    Interval HPI: AVF clotted during HD    PAST MEDICAL & SURGICAL HISTORY:  COPD (chronic obstructive pulmonary disease)  Migraine  HTN (hypertension)  DM (diabetes mellitus)  Bipolar disorder  Schizophrenia  ESRD on dialysis  Morbid obesity with BMI of 40.0-44.9, adult  H/O hernia repair      REVIEW OF SYSTEMS:  as per hpi    VITALS:  Vital Signs Last 24 Hrs  T(C): 37.1 (30 Dec 2019 21:02), Max: 37.3 (30 Dec 2019 04:10)  T(F): 98.7 (30 Dec 2019 21:02), Max: 99.1 (30 Dec 2019 04:10)  HR: 97 (30 Dec 2019 21:02) (76 - 97)  BP: 116/63 (30 Dec 2019 21:02) (110/64 - 136/71)  BP(mean): --  RR: 17 (30 Dec 2019 21:02) (17 - 18)  SpO2: 98% (30 Dec 2019 21:02) (93% - 98%)      PHYSICAL EXAM:  GENERAL: NAD, well-developed, obese, lethargic but arousable   HEAD:  Atraumatic, Normocephalic  EYES: EOMI, PERRLA, conjunctiva and sclera clear  NECK: Supple, No JVD  CHEST/LUNG: Clear to auscultation bilaterally; No wheeze  HEART: S1, S2; No murmurs, rubs, or gallops  ABDOMEN: Soft, Nontender, Nondistended; Bowel sounds present  EXTREMITIES:  2+ Peripheral Pulses, No clubbing, cyanosis,+  edema  PSYCH: Normal affect     Consultant(s) Notes Reviewed:  [x ] YES  [ ] NO  Care Discussed with Consultants/Other Providers [ x] YES  [ ] NO    MEDS:  MEDICATIONS  (STANDING):  ALBUTerol    90 MICROgram(s) HFA Inhaler 2 Puff(s) Inhalation every 6 hours  ascorbic acid 500 milliGRAM(s) Oral daily  carvedilol 6.25 milliGRAM(s) Oral every 12 hours  chlorhexidine 4% Liquid 1 Application(s) Topical once  clopidogrel Tablet 75 milliGRAM(s) Oral daily  dextrose 5%. 1000 milliLiter(s) (50 mL/Hr) IV Continuous <Continuous>  dextrose 50% Injectable 12.5 Gram(s) IV Push once  dextrose 50% Injectable 25 Gram(s) IV Push once  dextrose 50% Injectable 25 Gram(s) IV Push once  epoetin cyndie Injectable 08390 Unit(s) IV Push <User Schedule>  ferrous    sulfate 325 milliGRAM(s) Oral daily  folic acid 1 milliGRAM(s) Oral daily  furosemide    Tablet 80 milliGRAM(s) Oral daily  gabapentin 300 milliGRAM(s) Oral at bedtime  heparin  Injectable 5000 Unit(s) SubCutaneous every 8 hours  hydrALAZINE 10 milliGRAM(s) Oral three times a day  insulin lispro (HumaLOG) corrective regimen sliding scale   SubCutaneous three times a day before meals  isosorbide   mononitrate ER Tablet (IMDUR) 30 milliGRAM(s) Oral daily  mirtazapine 7.5 milliGRAM(s) Oral at bedtime  pantoprazole    Tablet 40 milliGRAM(s) Oral before breakfast  PARoxetine 20 milliGRAM(s) Oral daily  risperiDONE   Tablet 1 milliGRAM(s) Oral every 12 hours  sevelamer carbonate 2400 milliGRAM(s) Oral three times a day with meals  simvastatin 40 milliGRAM(s) Oral at bedtime    MEDICATIONS  (PRN):  dextrose 40% Gel 15 Gram(s) Oral once PRN Blood Glucose LESS THAN 70 milliGRAM(s)/deciliter  glucagon  Injectable 1 milliGRAM(s) IntraMuscular once PRN Glucose LESS THAN 70 milligrams/deciliter  methocarbamol 500 milliGRAM(s) Oral every 12 hours PRN spasms  traMADol 25 milliGRAM(s) Oral every 8 hours PRN Severe Pain (7 - 10)      ALLERGIES:  aspirin (Swelling)  fish (Unknown)  penicillins (Swelling)  shellfish (Unknown)      LABS:                                                 8.8    8.03  )-----------( 400      ( 29 Dec 2019 09:19 )             30.6   12-29    133<L>  |  92<L>  |  23  ----------------------------<  107<H>  4.0   |  28  |  6.08<H>    Ca    10.5      29 Dec 2019 06:02                 < from: CT Head No Cont (12.23.19 @ 18:03) >    IMPRESSION: Unremarkable noncontrast CT of the brain. No change from 2/21/2019.        < end of copied text >

## 2019-12-30 NOTE — PROGRESS NOTE ADULT - SUBJECTIVE AND OBJECTIVE BOX
Follow-up Pulm Progress Note  Manuelito Mendez MD  [phone]    Alert at baseline mental status  Still refusing CPAP  No c/o dyspnea at rest    Vital Signs Last 24 Hrs  T(C): 36.9 (30 Dec 2019 11:10), Max: 37.3 (30 Dec 2019 04:10)  T(F): 98.5 (30 Dec 2019 11:10), Max: 99.1 (30 Dec 2019 04:10)  HR: 88 (30 Dec 2019 11:10) (76 - 88)  BP: 129/74 (30 Dec 2019 11:10) (103/58 - 129/74)  BP(mean): --  RR: 18 (30 Dec 2019 11:10) (17 - 18)  SpO2: 96% (30 Dec 2019 11:10) (93% - 97%)                          8.8    8.03  )-----------( 400      ( 29 Dec 2019 09:19 )             30.6     12-29    133<L>  |  92<L>  |  23  ----------------------------<  107<H>  4.0   |  28  |  6.08<H>    Ca    10.5      29 Dec 2019 06:02        Physical Examination:  PULM: No wheeze or rhonchi  CVS: Regular rate and rhythm, no murmurs, rubs, or gallops  ABD: Soft, non-tender  EXT:  No clubbing, cyanosis, or edema    RADIOLOGY REVIEWED  CXR:    CT chest:    TTE:

## 2019-12-30 NOTE — PROGRESS NOTE ADULT - SUBJECTIVE AND OBJECTIVE BOX
Subjective: Patient seen and examined. No new events except as noted.   no cp or sob   refuses CPAP     REVIEW OF SYSTEMS:    CONSTITUTIONAL:+ weakness, fevers or chills  EYES/ENT: No visual changes;  No vertigo or throat pain   NECK: No pain or stiffness  RESPIRATORY: No cough, wheezing, hemoptysis; No shortness of breath  CARDIOVASCULAR: No chest pain or palpitations  GASTROINTESTINAL: No abdominal or epigastric pain. No nausea, vomiting, or hematemesis; No diarrhea or constipation. No melena or hematochezia.  GENITOURINARY: No dysuria, frequency or hematuria  NEUROLOGICAL: No numbness or weakness  SKIN: No itching, burning, rashes, or lesions   All other review of systems is negative unless indicated above.    MEDICATIONS:  MEDICATIONS  (STANDING):  ALBUTerol    90 MICROgram(s) HFA Inhaler 2 Puff(s) Inhalation every 6 hours  ascorbic acid 500 milliGRAM(s) Oral daily  carvedilol 6.25 milliGRAM(s) Oral every 12 hours  chlorhexidine 4% Liquid 1 Application(s) Topical once  clonazePAM  Tablet 0.5 milliGRAM(s) Oral every 12 hours  clopidogrel Tablet 75 milliGRAM(s) Oral daily  dextrose 5%. 1000 milliLiter(s) (50 mL/Hr) IV Continuous <Continuous>  dextrose 50% Injectable 12.5 Gram(s) IV Push once  dextrose 50% Injectable 25 Gram(s) IV Push once  dextrose 50% Injectable 25 Gram(s) IV Push once  epoetin cyndie Injectable 52732 Unit(s) IV Push <User Schedule>  ferrous    sulfate 325 milliGRAM(s) Oral daily  folic acid 1 milliGRAM(s) Oral daily  furosemide    Tablet 80 milliGRAM(s) Oral daily  gabapentin 300 milliGRAM(s) Oral at bedtime  heparin  Injectable 5000 Unit(s) SubCutaneous every 8 hours  hydrALAZINE 10 milliGRAM(s) Oral three times a day  insulin lispro (HumaLOG) corrective regimen sliding scale   SubCutaneous three times a day before meals  isosorbide   mononitrate ER Tablet (IMDUR) 30 milliGRAM(s) Oral daily  mirtazapine 7.5 milliGRAM(s) Oral at bedtime  pantoprazole    Tablet 40 milliGRAM(s) Oral before breakfast  PARoxetine 20 milliGRAM(s) Oral daily  risperiDONE   Tablet 1 milliGRAM(s) Oral every 12 hours  sevelamer carbonate 2400 milliGRAM(s) Oral three times a day with meals  simvastatin 40 milliGRAM(s) Oral at bedtime      PHYSICAL EXAM:  T(C): 36.9 (12-30-19 @ 11:10), Max: 37.3 (12-30-19 @ 04:10)  HR: 88 (12-30-19 @ 11:10) (76 - 88)  BP: 129/74 (12-30-19 @ 11:10) (103/58 - 129/74)  RR: 18 (12-30-19 @ 11:10) (17 - 18)  SpO2: 96% (12-30-19 @ 11:10) (93% - 97%)  Wt(kg): --  I&O's Summary    29 Dec 2019 07:01  -  30 Dec 2019 07:00  --------------------------------------------------------  IN: 120 mL / OUT: 0 mL / NET: 120 mL    30 Dec 2019 07:01  -  30 Dec 2019 12:08  --------------------------------------------------------  IN: 360 mL / OUT: 0 mL / NET: 360 mL          Appearance: morbidly obese   HEENT:   Dry  oral mucosa, PERRL, EOMI	  Lymphatic: No lymphadenopathy , no edema  Cardiovascular: Normal S1 S2, No JVD, No murmurs , Peripheral pulses palpable 2+ bilaterally  Respiratory: Lungs clear to auscultation, normal effort 	  Gastrointestinal:  Soft, Non-tender, + BS	  Skin: No rashes, No ecchymoses, No cyanosis, warm to touch  Musculoskeletal: Normal range of motion, normal strength  Psychiatry:  Mood & affect appropriate  Ext: No edema      LABS:    CARDIAC MARKERS:                                8.8    8.03  )-----------( 400      ( 29 Dec 2019 09:19 )             30.6     12-29    133<L>  |  92<L>  |  23  ----------------------------<  107<H>  4.0   |  28  |  6.08<H>    Ca    10.5      29 Dec 2019 06:02      proBNP:   Lipid Profile:   HgA1c:   TSH:             TELEMETRY: SR	    ECG:  	  RADIOLOGY:   DIAGNOSTIC TESTING:  [ ] Echocardiogram:  [ ]  Catheterization:  [ ] Stress Test:    OTHER:

## 2019-12-30 NOTE — CONSULT NOTE ADULT - ASSESSMENT
11 yo male extensive pmhx as noted above including esrd on hd, copd on o2, obesity, htn, dm, migraine, schizo, bipolar, a/w presyncope and fluid overload
39 y/o M pt with multiple co morbid conditions including ESRD on hemodialysis 3 x weekly, COPD on home oxygen at baseline, obesity, HTN, DM2, migraine HA, schizophrenia and bipolar disorder, came for eval of AVF due to decreased thrill, then during ED stay RN who found patient slumped over in bed requiring sternal rub.  at time of evaluation. vitals stable. admitted for near syncope work up  Patient receives HD at [facility] via [facility] AVF MWF, missed Chidi holiday scheduled HD
ASSESSMENT:  40 yr old male with ESRD on HD via LUE proximal AVF, having issues during HD with clotting    PLAN:  - Please obtain Doppler US of LUE AVF  - Possible fistulogram planning pending US results  - Surgery will follow  - Plan discussed with vascular fellow on behalf of attending      Clifford Hobbs, PGY 2  Surgery [phone]
Lethargy/altered mental status in part due to sleep apnea with acute respiratory acidosis, uremia  Acute on chronic diastolic CHF with mild radiographic pulmonary edema and severe anasarca  Morbid Obesity with hypercapnea: r/o sleep apnea, obesity hypoventilation  No clinical/radiographic suggestion of infection, though difficult to exclude - CXR non focal, pt afebrile with nl WBC  Acute hypercapneic respiratory failure  TTE in 9/2019 with severe diastolic dysfunction/conc LVH and normal LV/RV systolic function    REC    Urgent HD for fluid overload  BIPAP: increase to 12/8 and repeat ABG on BIPAP tonight  Observe off abx  Rapid viral PCR ordered  LE dopplers  Cardiology evaluation, f/u  Duoneb qid  Likely ransition to nasal oxygen day and CPAP nasal night once pulmonary edema resolves with dialysis and patient recovers mental status

## 2019-12-31 LAB
ANION GAP SERPL CALC-SCNC: 20 MMOL/L — HIGH (ref 5–17)
BUN SERPL-MCNC: 39 MG/DL — HIGH (ref 7–23)
CALCIUM SERPL-MCNC: 10.1 MG/DL — SIGNIFICANT CHANGE UP (ref 8.4–10.5)
CHLORIDE SERPL-SCNC: 88 MMOL/L — LOW (ref 96–108)
CO2 SERPL-SCNC: 24 MMOL/L — SIGNIFICANT CHANGE UP (ref 22–31)
CREAT SERPL-MCNC: 9.02 MG/DL — HIGH (ref 0.5–1.3)
GLUCOSE BLDC GLUCOMTR-MCNC: 108 MG/DL — HIGH (ref 70–99)
GLUCOSE BLDC GLUCOMTR-MCNC: 123 MG/DL — HIGH (ref 70–99)
GLUCOSE BLDC GLUCOMTR-MCNC: 93 MG/DL — SIGNIFICANT CHANGE UP (ref 70–99)
GLUCOSE BLDC GLUCOMTR-MCNC: 93 MG/DL — SIGNIFICANT CHANGE UP (ref 70–99)
GLUCOSE BLDC GLUCOMTR-MCNC: 94 MG/DL — SIGNIFICANT CHANGE UP (ref 70–99)
GLUCOSE SERPL-MCNC: 88 MG/DL — SIGNIFICANT CHANGE UP (ref 70–99)
POTASSIUM SERPL-MCNC: 4.7 MMOL/L — SIGNIFICANT CHANGE UP (ref 3.5–5.3)
POTASSIUM SERPL-SCNC: 4.7 MMOL/L — SIGNIFICANT CHANGE UP (ref 3.5–5.3)
SODIUM SERPL-SCNC: 132 MMOL/L — LOW (ref 135–145)

## 2019-12-31 PROCEDURE — 93990 DOPPLER FLOW TESTING: CPT | Mod: 26

## 2019-12-31 RX ORDER — HYDROMORPHONE HYDROCHLORIDE 2 MG/ML
2 INJECTION INTRAMUSCULAR; INTRAVENOUS; SUBCUTANEOUS ONCE
Refills: 0 | Status: DISCONTINUED | OUTPATIENT
Start: 2019-12-31 | End: 2019-12-31

## 2019-12-31 RX ORDER — TRAMADOL HYDROCHLORIDE 50 MG/1
25 TABLET ORAL EVERY 12 HOURS
Refills: 0 | Status: DISCONTINUED | OUTPATIENT
Start: 2019-12-31 | End: 2019-12-31

## 2019-12-31 RX ORDER — HYDROMORPHONE HYDROCHLORIDE 2 MG/ML
1 INJECTION INTRAMUSCULAR; INTRAVENOUS; SUBCUTANEOUS ONCE
Refills: 0 | Status: DISCONTINUED | OUTPATIENT
Start: 2019-12-31 | End: 2019-12-31

## 2019-12-31 RX ORDER — TRAMADOL HYDROCHLORIDE 50 MG/1
25 TABLET ORAL EVERY 8 HOURS
Refills: 0 | Status: DISCONTINUED | OUTPATIENT
Start: 2019-12-31 | End: 2019-12-31

## 2019-12-31 RX ORDER — POLYETHYLENE GLYCOL 3350 17 G/17G
17 POWDER, FOR SOLUTION ORAL DAILY
Refills: 0 | Status: DISCONTINUED | OUTPATIENT
Start: 2019-12-31 | End: 2020-01-06

## 2019-12-31 RX ORDER — CLONAZEPAM 1 MG
0.5 TABLET ORAL EVERY 12 HOURS
Refills: 0 | Status: DISCONTINUED | OUTPATIENT
Start: 2019-12-31 | End: 2020-01-06

## 2019-12-31 RX ORDER — SENNA PLUS 8.6 MG/1
2 TABLET ORAL AT BEDTIME
Refills: 0 | Status: DISCONTINUED | OUTPATIENT
Start: 2019-12-31 | End: 2020-01-06

## 2019-12-31 RX ORDER — HYDROMORPHONE HYDROCHLORIDE 2 MG/ML
0.5 INJECTION INTRAMUSCULAR; INTRAVENOUS; SUBCUTANEOUS ONCE
Refills: 0 | Status: DISCONTINUED | OUTPATIENT
Start: 2019-12-31 | End: 2019-12-31

## 2019-12-31 RX ADMIN — CARVEDILOL PHOSPHATE 6.25 MILLIGRAM(S): 80 CAPSULE, EXTENDED RELEASE ORAL at 21:31

## 2019-12-31 RX ADMIN — MIRTAZAPINE 7.5 MILLIGRAM(S): 45 TABLET, ORALLY DISINTEGRATING ORAL at 21:32

## 2019-12-31 RX ADMIN — Medication 500 MILLIGRAM(S): at 12:11

## 2019-12-31 RX ADMIN — CARVEDILOL PHOSPHATE 6.25 MILLIGRAM(S): 80 CAPSULE, EXTENDED RELEASE ORAL at 05:46

## 2019-12-31 RX ADMIN — HEPARIN SODIUM 5000 UNIT(S): 5000 INJECTION INTRAVENOUS; SUBCUTANEOUS at 16:23

## 2019-12-31 RX ADMIN — SEVELAMER CARBONATE 2400 MILLIGRAM(S): 2400 POWDER, FOR SUSPENSION ORAL at 12:11

## 2019-12-31 RX ADMIN — GABAPENTIN 300 MILLIGRAM(S): 400 CAPSULE ORAL at 21:31

## 2019-12-31 RX ADMIN — Medication 20 MILLIGRAM(S): at 12:11

## 2019-12-31 RX ADMIN — HEPARIN SODIUM 5000 UNIT(S): 5000 INJECTION INTRAVENOUS; SUBCUTANEOUS at 05:46

## 2019-12-31 RX ADMIN — SENNA PLUS 2 TABLET(S): 8.6 TABLET ORAL at 21:31

## 2019-12-31 RX ADMIN — ALBUTEROL 2 PUFF(S): 90 AEROSOL, METERED ORAL at 12:12

## 2019-12-31 RX ADMIN — HYDROMORPHONE HYDROCHLORIDE 0.5 MILLIGRAM(S): 2 INJECTION INTRAMUSCULAR; INTRAVENOUS; SUBCUTANEOUS at 18:43

## 2019-12-31 RX ADMIN — ERYTHROPOIETIN 20000 UNIT(S): 10000 INJECTION, SOLUTION INTRAVENOUS; SUBCUTANEOUS at 18:14

## 2019-12-31 RX ADMIN — Medication 0.5 MILLIGRAM(S): at 21:31

## 2019-12-31 RX ADMIN — HYDROMORPHONE HYDROCHLORIDE 1 MILLIGRAM(S): 2 INJECTION INTRAMUSCULAR; INTRAVENOUS; SUBCUTANEOUS at 04:43

## 2019-12-31 RX ADMIN — Medication 80 MILLIGRAM(S): at 05:46

## 2019-12-31 RX ADMIN — PANTOPRAZOLE SODIUM 40 MILLIGRAM(S): 20 TABLET, DELAYED RELEASE ORAL at 05:48

## 2019-12-31 RX ADMIN — ALBUTEROL 2 PUFF(S): 90 AEROSOL, METERED ORAL at 05:48

## 2019-12-31 RX ADMIN — HEPARIN SODIUM 5000 UNIT(S): 5000 INJECTION INTRAVENOUS; SUBCUTANEOUS at 21:31

## 2019-12-31 RX ADMIN — CLOPIDOGREL BISULFATE 75 MILLIGRAM(S): 75 TABLET, FILM COATED ORAL at 12:11

## 2019-12-31 RX ADMIN — HYDROMORPHONE HYDROCHLORIDE 1 MILLIGRAM(S): 2 INJECTION INTRAMUSCULAR; INTRAVENOUS; SUBCUTANEOUS at 04:07

## 2019-12-31 RX ADMIN — SEVELAMER CARBONATE 2400 MILLIGRAM(S): 2400 POWDER, FOR SUSPENSION ORAL at 08:50

## 2019-12-31 RX ADMIN — ISOSORBIDE MONONITRATE 30 MILLIGRAM(S): 60 TABLET, EXTENDED RELEASE ORAL at 21:31

## 2019-12-31 RX ADMIN — HYDROMORPHONE HYDROCHLORIDE 2 MILLIGRAM(S): 2 INJECTION INTRAMUSCULAR; INTRAVENOUS; SUBCUTANEOUS at 16:16

## 2019-12-31 RX ADMIN — SIMVASTATIN 40 MILLIGRAM(S): 20 TABLET, FILM COATED ORAL at 21:31

## 2019-12-31 RX ADMIN — Medication 10 MILLIGRAM(S): at 05:46

## 2019-12-31 RX ADMIN — POLYETHYLENE GLYCOL 3350 17 GRAM(S): 17 POWDER, FOR SOLUTION ORAL at 12:11

## 2019-12-31 RX ADMIN — HYDROMORPHONE HYDROCHLORIDE 0.5 MILLIGRAM(S): 2 INJECTION INTRAMUSCULAR; INTRAVENOUS; SUBCUTANEOUS at 18:13

## 2019-12-31 RX ADMIN — Medication 1 MILLIGRAM(S): at 12:11

## 2019-12-31 RX ADMIN — RISPERIDONE 1 MILLIGRAM(S): 4 TABLET ORAL at 05:46

## 2019-12-31 RX ADMIN — HYDROMORPHONE HYDROCHLORIDE 2 MILLIGRAM(S): 2 INJECTION INTRAMUSCULAR; INTRAVENOUS; SUBCUTANEOUS at 17:00

## 2019-12-31 RX ADMIN — RISPERIDONE 1 MILLIGRAM(S): 4 TABLET ORAL at 21:31

## 2019-12-31 NOTE — PROGRESS NOTE ADULT - PROBLEM SELECTOR PLAN 1
Due for UF today and Thursday, and HD on Friday  Patient noted to have large clots on proximal side of AVF  On USG found to have collapsed/stenotic stent with large clot formed around it in vessel lumen, Vascular surgery eval appreciated  Dose meds for ESRD  Daily BMP

## 2019-12-31 NOTE — PROGRESS NOTE ADULT - ASSESSMENT
39 y/o M pt with multiple co morbid conditions including ESRD on hemodialysis 3 x weekly, COPD on home oxygen at baseline, obesity, HTN, DM2, migraine HA, schizophrenia and bipolar disorder, came for eval of AVF due to decreased thrill, then during ED stay RN who found patient slumped over in bed requiring sternal rub.  at time of evaluation. vitals stable. admitted for near syncope work up  Patient receives HD at [facility] via [facility] AVF MWF, missed Chidi holiday scheduled HD

## 2019-12-31 NOTE — PROGRESS NOTE ADULT - SUBJECTIVE AND OBJECTIVE BOX
Follow-up Pulm Progress Note  Manuelito Mendez MD  [phone]    Stable cardio-resp status  Afebrile and hemodynamically stable    Vital Signs Last 24 Hrs  T(C): 37.1 (31 Dec 2019 11:19), Max: 37.1 (30 Dec 2019 21:02)  T(F): 98.8 (31 Dec 2019 11:19), Max: 98.8 (31 Dec 2019 11:19)  HR: 80 (31 Dec 2019 11:19) (80 - 97)  BP: 92/55 (31 Dec 2019 11:19) (92/55 - 136/71)  BP(mean): --  RR: 18 (31 Dec 2019 11:19) (17 - 18)  SpO2: 95% (31 Dec 2019 11:19) (94% - 100%)        Physical Examination:  PULM: No wheeze or rhonchi  CVS: Regular rate and rhythm, no murmurs, rubs, or gallops  ABD: Soft, non-tender  EXT:  No clubbing, cyanosis, or edema    RADIOLOGY REVIEWED  CXR:    CT chest:    TTE:

## 2019-12-31 NOTE — PROGRESS NOTE ADULT - ASSESSMENT
41 yo male extensive pmhx as noted above including esrd on hd, copd on o2, obesity, htn, dm, migraine, schizo, bipolar, a/w presyncope and fluid overload    fluid overload  renal consulted  plan for HD as per renal      clotted avf  vasc eval noted  f/u dopplers  possible fistulogram    presyncope   CT head noted   check echo - ok to do outpt  cards consulted    DM  insulin as per endo    copd/alice  on home o2  pulm following  cpap at night. pt refuses/ non compliant     cont all prior home meds    dvt ppx    PT           Advanced care planning was discussed with patient and family.  Advanced care planning forms were reviewed and discussed.  Differential diagnosis and plan of care discussed with patient after the evaluation.   Pain assessed and judicious use of narcotics when appropriate was discussed.  Importance of Fall prevention discussed.  Counseling on Smoking and Alcohol cessation was offered when appropriate.  Counseling on Diet, exercise, and medication compliance was done.

## 2019-12-31 NOTE — PROGRESS NOTE ADULT - ASSESSMENT
ACute on chronic hypercapneic respiratory failure  Morbid obesity: r/o sleep apnea, obesity h ypoventilation  Acute on chronci HfPef  Pulmonary edme  ESRD with anasarca, fluid overload    REC    DC planning for GREER  HD per renal

## 2019-12-31 NOTE — PROGRESS NOTE ADULT - SUBJECTIVE AND OBJECTIVE BOX
PRESTON JOHNSON  39y Male  MRN:[id number]    Patient is a 39y old  Male who presents with a chief complaint of presyncope (24 Dec 2019 11:58)    HPI:  39 y/o M pt with multiple co morbid conditions including ESRD on hemodialysis 3 x weekly, COPD on home oxygen at baseline, obesity, HTN, DM2, migraine HA, schizophrenia and bipolar disorder, came for eval of AVF due to decreased thrill, then during ED stay RN who found patient slumped over in bed requiring sternal rub.  at time of evaluation. vitals stable. admitted for near syncope work up  Patient receives HD at [facility] via [facility] MWF, missed Sunday holiday scheduled HD (23 Dec 2019 13:24)      Patient seen and evaluated at bedside. No acute events overnight except as noted.    Interval HPI: no events o/n    PAST MEDICAL & SURGICAL HISTORY:  COPD (chronic obstructive pulmonary disease)  Migraine  HTN (hypertension)  DM (diabetes mellitus)  Bipolar disorder  Schizophrenia  ESRD on dialysis  Morbid obesity with BMI of 40.0-44.9, adult  H/O hernia repair      REVIEW OF SYSTEMS:  as per hpi    VITALS:  Vital Signs Last 24 Hrs  T(C): 37.1 (31 Dec 2019 11:19), Max: 37.1 (30 Dec 2019 21:02)  T(F): 98.8 (31 Dec 2019 11:19), Max: 98.8 (31 Dec 2019 11:19)  HR: 80 (31 Dec 2019 11:19) (80 - 97)  BP: 92/55 (31 Dec 2019 11:19) (92/55 - 135/74)  BP(mean): --  RR: 18 (31 Dec 2019 11:19) (17 - 18)  SpO2: 95% (31 Dec 2019 11:19) (94% - 100%)      PHYSICAL EXAM:  GENERAL: NAD, well-developed, obese, lethargic but arousable   HEAD:  Atraumatic, Normocephalic  EYES: EOMI, PERRLA, conjunctiva and sclera clear  NECK: Supple, No JVD  CHEST/LUNG: Clear to auscultation bilaterally; No wheeze  HEART: S1, S2; No murmurs, rubs, or gallops  ABDOMEN: Soft, Nontender, Nondistended; Bowel sounds present  EXTREMITIES:  2+ Peripheral Pulses, No clubbing, cyanosis,+  edema  PSYCH: Normal affect     Consultant(s) Notes Reviewed:  [x ] YES  [ ] NO  Care Discussed with Consultants/Other Providers [ x] YES  [ ] NO    MEDS:  MEDICATIONS  (STANDING):  ALBUTerol    90 MICROgram(s) HFA Inhaler 2 Puff(s) Inhalation every 6 hours  ascorbic acid 500 milliGRAM(s) Oral daily  carvedilol 6.25 milliGRAM(s) Oral every 12 hours  chlorhexidine 4% Liquid 1 Application(s) Topical once  clonazePAM  Tablet 0.5 milliGRAM(s) Oral every 12 hours  clopidogrel Tablet 75 milliGRAM(s) Oral daily  dextrose 5%. 1000 milliLiter(s) (50 mL/Hr) IV Continuous <Continuous>  dextrose 50% Injectable 12.5 Gram(s) IV Push once  dextrose 50% Injectable 25 Gram(s) IV Push once  dextrose 50% Injectable 25 Gram(s) IV Push once  epoetin cyndie Injectable 20608 Unit(s) IV Push <User Schedule>  ferrous    sulfate 325 milliGRAM(s) Oral daily  folic acid 1 milliGRAM(s) Oral daily  furosemide    Tablet 80 milliGRAM(s) Oral daily  gabapentin 300 milliGRAM(s) Oral at bedtime  heparin  Injectable 5000 Unit(s) SubCutaneous every 8 hours  insulin lispro (HumaLOG) corrective regimen sliding scale   SubCutaneous three times a day before meals  isosorbide   mononitrate ER Tablet (IMDUR) 30 milliGRAM(s) Oral daily  mirtazapine 7.5 milliGRAM(s) Oral at bedtime  pantoprazole    Tablet 40 milliGRAM(s) Oral before breakfast  PARoxetine 20 milliGRAM(s) Oral daily  polyethylene glycol 3350 17 Gram(s) Oral daily  risperiDONE   Tablet 1 milliGRAM(s) Oral every 12 hours  senna 2 Tablet(s) Oral at bedtime  sevelamer carbonate 2400 milliGRAM(s) Oral three times a day with meals  simvastatin 40 milliGRAM(s) Oral at bedtime    MEDICATIONS  (PRN):  dextrose 40% Gel 15 Gram(s) Oral once PRN Blood Glucose LESS THAN 70 milliGRAM(s)/deciliter  glucagon  Injectable 1 milliGRAM(s) IntraMuscular once PRN Glucose LESS THAN 70 milligrams/deciliter  methocarbamol 500 milliGRAM(s) Oral every 12 hours PRN spasms  traMADol 25 milliGRAM(s) Oral every 12 hours PRN Moderate Pain (4 - 6)        ALLERGIES:  aspirin (Swelling)  fish (Unknown)  penicillins (Swelling)  shellfish (Unknown)      LABS:                     pending               < from: CT Head No Cont (12.23.19 @ 18:03) >    IMPRESSION: Unremarkable noncontrast CT of the brain. No change from 2/21/2019.        < end of copied text >

## 2019-12-31 NOTE — PROGRESS NOTE ADULT - SUBJECTIVE AND OBJECTIVE BOX
Subjective: Patient seen and examined. No new events except as noted.     REVIEW OF SYSTEMS:    CONSTITUTIONAL: +weakness, fevers or chills  EYES/ENT: No visual changes;  No vertigo or throat pain   NECK: No pain or stiffness  RESPIRATORY: No cough, wheezing, hemoptysis; No shortness of breath  CARDIOVASCULAR: No chest pain or palpitations  GASTROINTESTINAL: No abdominal or epigastric pain. No nausea, vomiting, or hematemesis; No diarrhea or constipation. No melena or hematochezia.  GENITOURINARY: No dysuria, frequency or hematuria  NEUROLOGICAL: No numbness or weakness  SKIN: No itching, burning, rashes, or lesions   All other review of systems is negative unless indicated above.    MEDICATIONS:  MEDICATIONS  (STANDING):  ALBUTerol    90 MICROgram(s) HFA Inhaler 2 Puff(s) Inhalation every 6 hours  ascorbic acid 500 milliGRAM(s) Oral daily  carvedilol 6.25 milliGRAM(s) Oral every 12 hours  chlorhexidine 4% Liquid 1 Application(s) Topical once  clopidogrel Tablet 75 milliGRAM(s) Oral daily  dextrose 5%. 1000 milliLiter(s) (50 mL/Hr) IV Continuous <Continuous>  dextrose 50% Injectable 12.5 Gram(s) IV Push once  dextrose 50% Injectable 25 Gram(s) IV Push once  dextrose 50% Injectable 25 Gram(s) IV Push once  epoetin cyndie Injectable 58981 Unit(s) IV Push <User Schedule>  ferrous    sulfate 325 milliGRAM(s) Oral daily  folic acid 1 milliGRAM(s) Oral daily  furosemide    Tablet 80 milliGRAM(s) Oral daily  gabapentin 300 milliGRAM(s) Oral at bedtime  heparin  Injectable 5000 Unit(s) SubCutaneous every 8 hours  hydrALAZINE 10 milliGRAM(s) Oral three times a day  insulin lispro (HumaLOG) corrective regimen sliding scale   SubCutaneous three times a day before meals  isosorbide   mononitrate ER Tablet (IMDUR) 30 milliGRAM(s) Oral daily  mirtazapine 7.5 milliGRAM(s) Oral at bedtime  pantoprazole    Tablet 40 milliGRAM(s) Oral before breakfast  PARoxetine 20 milliGRAM(s) Oral daily  polyethylene glycol 3350 17 Gram(s) Oral daily  risperiDONE   Tablet 1 milliGRAM(s) Oral every 12 hours  senna 2 Tablet(s) Oral at bedtime  sevelamer carbonate 2400 milliGRAM(s) Oral three times a day with meals  simvastatin 40 milliGRAM(s) Oral at bedtime      PHYSICAL EXAM:  T(C): 37.1 (12-31-19 @ 11:19), Max: 37.1 (12-30-19 @ 21:02)  HR: 80 (12-31-19 @ 11:19) (80 - 97)  BP: 92/55 (12-31-19 @ 11:19) (92/55 - 136/71)  RR: 18 (12-31-19 @ 11:19) (17 - 18)  SpO2: 95% (12-31-19 @ 11:19) (94% - 100%)  Wt(kg): --  I&O's Summary    30 Dec 2019 07:01  -  31 Dec 2019 07:00  --------------------------------------------------------  IN: 1560 mL / OUT: 787 mL / NET: 773 mL    31 Dec 2019 07:01  -  31 Dec 2019 12:40  --------------------------------------------------------  IN: 360 mL / OUT: 0 mL / NET: 360 mL          Appearance: Morbidly obese 	  HEENT:  Dry oral mucosa, PERRL, EOMI	  Lymphatic: No lymphadenopathy , no edema  Cardiovascular: Normal S1 S2, No JVD, No murmurs , Peripheral pulses palpable 2+ bilaterally  Respiratory: Lungs clear to auscultation, normal effort 	  Gastrointestinal:  Soft, Non-tender, + BS	  Skin: No rashes, No ecchymoses, No cyanosis, warm to touch  Musculoskeletal: Normal range of motion, normal strength  Psychiatry:  Mood & affect appropriate  Ext: No edema      LABS:    CARDIAC MARKERS:                  proBNP:   Lipid Profile:   HgA1c:   TSH:             TELEMETRY: 	    ECG:  	  RADIOLOGY:   DIAGNOSTIC TESTING:  [ ] Echocardiogram:  [ ]  Catheterization:  [ ] Stress Test:    OTHER:

## 2019-12-31 NOTE — PROGRESS NOTE ADULT - ASSESSMENT
9 yo male extensive pmhx as noted above including esrd on hd, copd on o2, obesity, htn, dm, migraine, schizo, bipolar, a/w presyncope and fluid overload no

## 2019-12-31 NOTE — PROGRESS NOTE ADULT - SUBJECTIVE AND OBJECTIVE BOX
[facility] - HUSSAIN Sanchez / HUSSAIN Villalobos / ERNESTO Mehta/ HUSSAIN Mauricio/ HUSSAIN Barber/ IVONNE Hui / JADEN La / BRANDON Alexander  ---------------------------------------------------------------------------------------------------------------  seen and examined today for ESRD  Interval : yesterday noted abundant clots from proximal side of AVF  VITALS:  T(F): 98.8 (12-31-19 @ 11:19), Max: 98.8 (12-31-19 @ 11:19)  HR: 80 (12-31-19 @ 11:19)  BP: 92/55 (12-31-19 @ 11:19)  RR: 18 (12-31-19 @ 11:19)  SpO2: 95% (12-31-19 @ 11:19)  Wt(kg): --    12-30 @ 07:01  -  12-31 @ 07:00  --------------------------------------------------------  IN: 1560 mL / OUT: 787 mL / NET: 773 mL    12-31 @ 07:01  -  12-31 @ 13:57  --------------------------------------------------------  IN: 600 mL / OUT: 0 mL / NET: 600 mL      Physical Exam :-  Constitutional: NAD  Neck: Supple.  Respiratory: Bilateral equal breath sounds,  Cardiovascular: S1, S2 normal,  Gastrointestinal: Bowel Sounds present, soft, non tender.  Extremities: 3+ edema  Neurological: Alert and Oriented x 3, no focal deficits  Psychiatric: Normal mood, normal affect  Data:-  Allergies :   aspirin (Swelling)  fish (Unknown)  penicillins (Swelling)  shellfish (Unknown)    Hospital Medications:   MEDICATIONS  (STANDING):  ALBUTerol    90 MICROgram(s) HFA Inhaler 2 Puff(s) Inhalation every 6 hours  ascorbic acid 500 milliGRAM(s) Oral daily  carvedilol 6.25 milliGRAM(s) Oral every 12 hours  chlorhexidine 4% Liquid 1 Application(s) Topical once  clonazePAM  Tablet 0.5 milliGRAM(s) Oral every 12 hours  clopidogrel Tablet 75 milliGRAM(s) Oral daily  dextrose 5%. 1000 milliLiter(s) (50 mL/Hr) IV Continuous <Continuous>  dextrose 50% Injectable 12.5 Gram(s) IV Push once  dextrose 50% Injectable 25 Gram(s) IV Push once  dextrose 50% Injectable 25 Gram(s) IV Push once  epoetin cyndie Injectable 70724 Unit(s) IV Push <User Schedule>  ferrous    sulfate 325 milliGRAM(s) Oral daily  folic acid 1 milliGRAM(s) Oral daily  furosemide    Tablet 80 milliGRAM(s) Oral daily  gabapentin 300 milliGRAM(s) Oral at bedtime  heparin  Injectable 5000 Unit(s) SubCutaneous every 8 hours  insulin lispro (HumaLOG) corrective regimen sliding scale   SubCutaneous three times a day before meals  isosorbide   mononitrate ER Tablet (IMDUR) 30 milliGRAM(s) Oral daily  mirtazapine 7.5 milliGRAM(s) Oral at bedtime  pantoprazole    Tablet 40 milliGRAM(s) Oral before breakfast  PARoxetine 20 milliGRAM(s) Oral daily  polyethylene glycol 3350 17 Gram(s) Oral daily  risperiDONE   Tablet 1 milliGRAM(s) Oral every 12 hours  senna 2 Tablet(s) Oral at bedtime  sevelamer carbonate 2400 milliGRAM(s) Oral three times a day with meals  simvastatin 40 milliGRAM(s) Oral at bedtime          Creatinine Trend: 6.08 <--, 10.61 <--, 8.87 <--

## 2020-01-01 LAB
GLUCOSE BLDC GLUCOMTR-MCNC: 102 MG/DL — HIGH (ref 70–99)
GLUCOSE BLDC GLUCOMTR-MCNC: 109 MG/DL — HIGH (ref 70–99)
GLUCOSE BLDC GLUCOMTR-MCNC: 112 MG/DL — HIGH (ref 70–99)
GLUCOSE BLDC GLUCOMTR-MCNC: 114 MG/DL — HIGH (ref 70–99)
HCT VFR BLD CALC: 30 % — LOW (ref 39–50)
HGB BLD-MCNC: 8.9 G/DL — LOW (ref 13–17)
MCHC RBC-ENTMCNC: 28.3 PG — SIGNIFICANT CHANGE UP (ref 27–34)
MCHC RBC-ENTMCNC: 29.7 GM/DL — LOW (ref 32–36)
MCV RBC AUTO: 95.5 FL — SIGNIFICANT CHANGE UP (ref 80–100)
PLATELET # BLD AUTO: 364 K/UL — SIGNIFICANT CHANGE UP (ref 150–400)
RBC # BLD: 3.14 M/UL — LOW (ref 4.2–5.8)
RBC # FLD: 18.3 % — HIGH (ref 10.3–14.5)
WBC # BLD: 8.85 K/UL — SIGNIFICANT CHANGE UP (ref 3.8–10.5)
WBC # FLD AUTO: 8.85 K/UL — SIGNIFICANT CHANGE UP (ref 3.8–10.5)

## 2020-01-01 RX ORDER — HYDROMORPHONE HYDROCHLORIDE 2 MG/ML
0.5 INJECTION INTRAMUSCULAR; INTRAVENOUS; SUBCUTANEOUS ONCE
Refills: 0 | Status: DISCONTINUED | OUTPATIENT
Start: 2020-01-01 | End: 2020-01-01

## 2020-01-01 RX ADMIN — HYDROMORPHONE HYDROCHLORIDE 0.5 MILLIGRAM(S): 2 INJECTION INTRAMUSCULAR; INTRAVENOUS; SUBCUTANEOUS at 21:01

## 2020-01-01 RX ADMIN — CARVEDILOL PHOSPHATE 6.25 MILLIGRAM(S): 80 CAPSULE, EXTENDED RELEASE ORAL at 17:51

## 2020-01-01 RX ADMIN — RISPERIDONE 1 MILLIGRAM(S): 4 TABLET ORAL at 06:50

## 2020-01-01 RX ADMIN — CLOPIDOGREL BISULFATE 75 MILLIGRAM(S): 75 TABLET, FILM COATED ORAL at 12:51

## 2020-01-01 RX ADMIN — Medication 20 MILLIGRAM(S): at 12:52

## 2020-01-01 RX ADMIN — CARVEDILOL PHOSPHATE 6.25 MILLIGRAM(S): 80 CAPSULE, EXTENDED RELEASE ORAL at 06:50

## 2020-01-01 RX ADMIN — HYDROMORPHONE HYDROCHLORIDE 0.5 MILLIGRAM(S): 2 INJECTION INTRAMUSCULAR; INTRAVENOUS; SUBCUTANEOUS at 20:31

## 2020-01-01 RX ADMIN — HYDROMORPHONE HYDROCHLORIDE 0.5 MILLIGRAM(S): 2 INJECTION INTRAMUSCULAR; INTRAVENOUS; SUBCUTANEOUS at 00:14

## 2020-01-01 RX ADMIN — SIMVASTATIN 40 MILLIGRAM(S): 20 TABLET, FILM COATED ORAL at 21:24

## 2020-01-01 RX ADMIN — ALBUTEROL 2 PUFF(S): 90 AEROSOL, METERED ORAL at 06:51

## 2020-01-01 RX ADMIN — ALBUTEROL 2 PUFF(S): 90 AEROSOL, METERED ORAL at 17:42

## 2020-01-01 RX ADMIN — Medication 0.5 MILLIGRAM(S): at 06:50

## 2020-01-01 RX ADMIN — Medication 80 MILLIGRAM(S): at 06:50

## 2020-01-01 RX ADMIN — Medication 500 MILLIGRAM(S): at 12:51

## 2020-01-01 RX ADMIN — GABAPENTIN 300 MILLIGRAM(S): 400 CAPSULE ORAL at 21:24

## 2020-01-01 RX ADMIN — SEVELAMER CARBONATE 2400 MILLIGRAM(S): 2400 POWDER, FOR SUSPENSION ORAL at 12:52

## 2020-01-01 RX ADMIN — Medication 0.5 MILLIGRAM(S): at 17:51

## 2020-01-01 RX ADMIN — METHOCARBAMOL 500 MILLIGRAM(S): 500 TABLET, FILM COATED ORAL at 12:56

## 2020-01-01 RX ADMIN — ISOSORBIDE MONONITRATE 30 MILLIGRAM(S): 60 TABLET, EXTENDED RELEASE ORAL at 12:51

## 2020-01-01 RX ADMIN — HEPARIN SODIUM 5000 UNIT(S): 5000 INJECTION INTRAVENOUS; SUBCUTANEOUS at 06:50

## 2020-01-01 RX ADMIN — HEPARIN SODIUM 5000 UNIT(S): 5000 INJECTION INTRAVENOUS; SUBCUTANEOUS at 12:55

## 2020-01-01 RX ADMIN — MIRTAZAPINE 7.5 MILLIGRAM(S): 45 TABLET, ORALLY DISINTEGRATING ORAL at 21:24

## 2020-01-01 RX ADMIN — HYDROMORPHONE HYDROCHLORIDE 0.5 MILLIGRAM(S): 2 INJECTION INTRAMUSCULAR; INTRAVENOUS; SUBCUTANEOUS at 00:44

## 2020-01-01 RX ADMIN — HYDROMORPHONE HYDROCHLORIDE 0.5 MILLIGRAM(S): 2 INJECTION INTRAMUSCULAR; INTRAVENOUS; SUBCUTANEOUS at 17:27

## 2020-01-01 RX ADMIN — SEVELAMER CARBONATE 2400 MILLIGRAM(S): 2400 POWDER, FOR SUSPENSION ORAL at 17:27

## 2020-01-01 RX ADMIN — SENNA PLUS 2 TABLET(S): 8.6 TABLET ORAL at 21:24

## 2020-01-01 RX ADMIN — ALBUTEROL 2 PUFF(S): 90 AEROSOL, METERED ORAL at 12:55

## 2020-01-01 RX ADMIN — Medication 325 MILLIGRAM(S): at 12:51

## 2020-01-01 RX ADMIN — HYDROMORPHONE HYDROCHLORIDE 0.5 MILLIGRAM(S): 2 INJECTION INTRAMUSCULAR; INTRAVENOUS; SUBCUTANEOUS at 18:03

## 2020-01-01 RX ADMIN — RISPERIDONE 1 MILLIGRAM(S): 4 TABLET ORAL at 17:51

## 2020-01-01 RX ADMIN — PANTOPRAZOLE SODIUM 40 MILLIGRAM(S): 20 TABLET, DELAYED RELEASE ORAL at 06:50

## 2020-01-01 RX ADMIN — Medication 1 MILLIGRAM(S): at 12:51

## 2020-01-01 RX ADMIN — HEPARIN SODIUM 5000 UNIT(S): 5000 INJECTION INTRAVENOUS; SUBCUTANEOUS at 21:24

## 2020-01-01 NOTE — PROGRESS NOTE ADULT - SUBJECTIVE AND OBJECTIVE BOX
Follow-up Pulm Progress Note  Manuelito Mendez MD  [phone]    Stable cardio-resp status  Afebrile and hemodynamically stable  Breathing comfortably supine  Sleeping/rousable    Vital Signs Last 24 Hrs  T(C): 37 (01 Jan 2020 11:21), Max: 37.6 (31 Dec 2019 21:19)  T(F): 98.6 (01 Jan 2020 11:21), Max: 99.7 (31 Dec 2019 21:19)  HR: 70 (01 Jan 2020 11:21) (70 - 86)  BP: 101/57 (01 Jan 2020 11:21) (101/57 - 142/69)  BP(mean): --  RR: 18 (01 Jan 2020 11:21) (16 - 18)  SpO2: 99% (01 Jan 2020 11:21) (97% - 99%)                          8.9    8.85  )-----------( 364      ( 31 Dec 2019 23:08 )             30.0       12-31    132<L>  |  88<L>  |  39<H>  ----------------------------<  88  4.7   |  24  |  9.02<H>    Ca    10.1      31 Dec 2019 19:34      Physical Examination:  PULM: No wheeze or rhonchi  CVS: Regular rate and rhythm, no murmurs, rubs, or gallops  ABD: Soft, non-tender  EXT:  No clubbing, cyanosis, or edema    RADIOLOGY REVIEWED  CXR:    CT chest:    TTE:

## 2020-01-01 NOTE — PROGRESS NOTE ADULT - SUBJECTIVE AND OBJECTIVE BOX
Subjective: Patient seen and examined. No new events except as noted.     REVIEW OF SYSTEMS:    CONSTITUTIONAL: +weakness, fevers or chills  EYES/ENT: No visual changes;  No vertigo or throat pain   NECK: No pain or stiffness  RESPIRATORY: No cough, wheezing, hemoptysis; No shortness of breath  CARDIOVASCULAR: No chest pain or palpitations  GASTROINTESTINAL: No abdominal or epigastric pain. No nausea, vomiting, or hematemesis; No diarrhea or constipation. No melena or hematochezia.  GENITOURINARY: No dysuria, frequency or hematuria  NEUROLOGICAL: No numbness or weakness  SKIN: No itching, burning, rashes, or lesions   All other review of systems is negative unless indicated above.    MEDICATIONS:  MEDICATIONS  (STANDING):  ALBUTerol    90 MICROgram(s) HFA Inhaler 2 Puff(s) Inhalation every 6 hours  ascorbic acid 500 milliGRAM(s) Oral daily  carvedilol 6.25 milliGRAM(s) Oral every 12 hours  chlorhexidine 4% Liquid 1 Application(s) Topical once  clonazePAM  Tablet 0.5 milliGRAM(s) Oral every 12 hours  clopidogrel Tablet 75 milliGRAM(s) Oral daily  dextrose 5%. 1000 milliLiter(s) (50 mL/Hr) IV Continuous <Continuous>  dextrose 50% Injectable 12.5 Gram(s) IV Push once  dextrose 50% Injectable 25 Gram(s) IV Push once  dextrose 50% Injectable 25 Gram(s) IV Push once  epoetin cyndie Injectable 49448 Unit(s) IV Push <User Schedule>  ferrous    sulfate 325 milliGRAM(s) Oral daily  folic acid 1 milliGRAM(s) Oral daily  furosemide    Tablet 80 milliGRAM(s) Oral daily  gabapentin 300 milliGRAM(s) Oral at bedtime  heparin  Injectable 5000 Unit(s) SubCutaneous every 8 hours  insulin lispro (HumaLOG) corrective regimen sliding scale   SubCutaneous three times a day before meals  isosorbide   mononitrate ER Tablet (IMDUR) 30 milliGRAM(s) Oral daily  mirtazapine 7.5 milliGRAM(s) Oral at bedtime  pantoprazole    Tablet 40 milliGRAM(s) Oral before breakfast  PARoxetine 20 milliGRAM(s) Oral daily  polyethylene glycol 3350 17 Gram(s) Oral daily  risperiDONE   Tablet 1 milliGRAM(s) Oral every 12 hours  senna 2 Tablet(s) Oral at bedtime  sevelamer carbonate 2400 milliGRAM(s) Oral three times a day with meals  simvastatin 40 milliGRAM(s) Oral at bedtime      PHYSICAL EXAM:  T(C): 37.2 (01-01-20 @ 04:49), Max: 37.6 (12-31-19 @ 21:19)  HR: 78 (01-01-20 @ 04:49) (78 - 86)  BP: 117/63 (01-01-20 @ 04:49) (92/55 - 142/69)  RR: 18 (01-01-20 @ 04:49) (16 - 18)  SpO2: 99% (01-01-20 @ 04:49) (95% - 99%)  Wt(kg): --  I&O's Summary    31 Dec 2019 07:01  -  01 Jan 2020 07:00  --------------------------------------------------------  IN: 2000 mL / OUT: 5000 mL / NET: -3000 mL          Appearance: Morbidly obese 	  HEENT:   Normal oral mucosa, PERRL, EOMI	  Lymphatic: No lymphadenopathy , no edema  Cardiovascular: Normal S1 S2, No JVD, No murmurs , Peripheral pulses palpable 2+ bilaterally  Respiratory: Lungs clear to auscultation, normal effort 	  Gastrointestinal:  Soft, Non-tender, + BS	  Skin: No rashes, No ecchymoses, No cyanosis, warm to touch  Musculoskeletal: Normal range of motion, normal strength  Psychiatry:  Mood & affect appropriate  Ext: No edema      LABS:    CARDIAC MARKERS:                                8.9    8.85  )-----------( 364      ( 31 Dec 2019 23:08 )             30.0     12-31    132<L>  |  88<L>  |  39<H>  ----------------------------<  88  4.7   |  24  |  9.02<H>    Ca    10.1      31 Dec 2019 19:34      proBNP:   Lipid Profile:   HgA1c:   TSH:             TELEMETRY: 	    ECG:  	  RADIOLOGY:   DIAGNOSTIC TESTING:  [ ] Echocardiogram:  [ ]  Catheterization:  [ ] Stress Test:    OTHER:

## 2020-01-01 NOTE — PROGRESS NOTE ADULT - PROBLEM SELECTOR PLAN 1
Due for UF tomorrow and HD thursday  Patient noted to have large clots on proximal side of AVF  On USG found to have collapsed/stenotic stent with large clot formed around it in vessel lumen, spoke with Vascular surgery to inform that patient getting inadequate HD because nursing is forced to place needles very proximal to each other. Recs appreciated.  Dose meds for ESRD  Daily BMP

## 2020-01-01 NOTE — PROGRESS NOTE ADULT - SUBJECTIVE AND OBJECTIVE BOX
Vascular Surgery Progress Note     S: Pt seen and examined at bedside during AM rounds  - No acute events overnight  - denies f/c, n/v, CP, abd pain, SOB, lightheadedness.     O:    ***PHYSICAL EXAM***    Gen: NAD, laying in bed  HEENT: NC/AT  RESP: nonlabored breathing  EXT: LUE fistula aneurysmal w/ thrill, dressing c/d/i, L radial pulse palpable    MEDICATIONS  (STANDING):  ALBUTerol    90 MICROgram(s) HFA Inhaler 2 Puff(s) Inhalation every 6 hours  ascorbic acid 500 milliGRAM(s) Oral daily  carvedilol 6.25 milliGRAM(s) Oral every 12 hours  chlorhexidine 4% Liquid 1 Application(s) Topical once  clonazePAM  Tablet 0.5 milliGRAM(s) Oral every 12 hours  clopidogrel Tablet 75 milliGRAM(s) Oral daily  dextrose 5%. 1000 milliLiter(s) (50 mL/Hr) IV Continuous <Continuous>  dextrose 50% Injectable 12.5 Gram(s) IV Push once  dextrose 50% Injectable 25 Gram(s) IV Push once  dextrose 50% Injectable 25 Gram(s) IV Push once  epoetin cyndie Injectable 00635 Unit(s) IV Push <User Schedule>  ferrous    sulfate 325 milliGRAM(s) Oral daily  folic acid 1 milliGRAM(s) Oral daily  furosemide    Tablet 80 milliGRAM(s) Oral daily  gabapentin 300 milliGRAM(s) Oral at bedtime  heparin  Injectable 5000 Unit(s) SubCutaneous every 8 hours  insulin lispro (HumaLOG) corrective regimen sliding scale   SubCutaneous three times a day before meals  isosorbide   mononitrate ER Tablet (IMDUR) 30 milliGRAM(s) Oral daily  mirtazapine 7.5 milliGRAM(s) Oral at bedtime  pantoprazole    Tablet 40 milliGRAM(s) Oral before breakfast  PARoxetine 20 milliGRAM(s) Oral daily  polyethylene glycol 3350 17 Gram(s) Oral daily  risperiDONE   Tablet 1 milliGRAM(s) Oral every 12 hours  senna 2 Tablet(s) Oral at bedtime  sevelamer carbonate 2400 milliGRAM(s) Oral three times a day with meals  simvastatin 40 milliGRAM(s) Oral at bedtime    MEDICATIONS  (PRN):  dextrose 40% Gel 15 Gram(s) Oral once PRN Blood Glucose LESS THAN 70 milliGRAM(s)/deciliter  glucagon  Injectable 1 milliGRAM(s) IntraMuscular once PRN Glucose LESS THAN 70 milligrams/deciliter  methocarbamol 500 milliGRAM(s) Oral every 12 hours PRN spasms  traMADol 25 milliGRAM(s) Oral every 12 hours PRN Moderate Pain (4 - 6)          Vital Signs Last 24 Hrs  T(C): 37.2 (01 Jan 2020 04:49), Max: 37.6 (31 Dec 2019 21:19)  T(F): 99 (01 Jan 2020 04:49), Max: 99.7 (31 Dec 2019 21:19)  HR: 78 (01 Jan 2020 04:49) (78 - 86)  BP: 117/63 (01 Jan 2020 04:49) (92/55 - 142/69)  BP(mean): --  RR: 18 (01 Jan 2020 04:49) (16 - 18)  SpO2: 99% (01 Jan 2020 04:49) (95% - 99%)    12-31-19 @ 07:01  -  01-01-20 @ 07:00  --------------------------------------------------------  IN: 2000 mL / OUT: 5000 mL / NET: -3000 mL            LABS:                        8.9    8.85  )-----------( 364      ( 31 Dec 2019 23:08 )             30.0     12-31    132<L>  |  88<L>  |  39<H>  ----------------------------<  88  4.7   |  24  |  9.02<H>    Ca    10.1      31 Dec 2019 19:34

## 2020-01-01 NOTE — PROGRESS NOTE ADULT - ASSESSMENT
40 yr old male with ESRD on HD via LUE proximal AVF, p/w "issues" w/ HD    PLAN:  - LUE AVF duplex w/ stenosis at the anastomosis and venous outflow of fistula  - pt received HD via the LUE fistula yesterday w/o issues  - will d/w Dr. Foote regarding possible intervention for the fistula    LATRELL Lebron PGY-2  Vascular  [id number] 40 yr old male with ESRD on HD via LUE proximal AVF, p/w "issues" w/ HD    PLAN:  - LUE AVF duplex w/ stenosis at the anastomosis and venous outflow of fistula  - However, pt received HD via the LUE fistula yesterday w/o issues  - No acute surgical intervention indicated at this time  - Please have the pt follow up w/ Dr. Foote after discharge  - Rest care per primary team, please page back w/ questions    LATRELL Lebron PGY-2  Vascular  [id number] 40 yr old male with ESRD on HD via LUE proximal AVF, p/w "issues" w/ HD    PLAN:  - LUE AVF duplex w/ stenosis at the anastomosis and venous outflow of fistula  - However, pt received HD via the LUE fistula yesterday w/o issues  - No acute surgical intervention indicated at this time  - Please have the pt follow up w/ Dr. Foote after discharge. If primary team wishes fistulogram during this admission, recommend IR consult  - Rest care per primary team, please page back w/ questions    LATRELL Lebron PGY-2  Vascular  [id number]

## 2020-01-01 NOTE — PROGRESS NOTE ADULT - ASSESSMENT
ACute on chronic hypercapneic respiratory failure  Morbid obesity: r/o sleep apnea, obesity h ypoventilation  Acute on chronci HfPef  Pulmonary edme  ESRD with anasarca, fluid overload    REC    HD per renal

## 2020-01-01 NOTE — PROGRESS NOTE ADULT - ASSESSMENT
39 yo male extensive pmhx as noted above including esrd on hd, copd on o2, obesity, htn, dm, migraine, schizo, bipolar, a/w presyncope and fluid overload    fluid overload  renal consulted  plan for HD as per renal      clotted avf  vasc eval    dopplers noted  f/u with vasc re possible fistulogram    presyncope   CT head noted   check echo - ok to do outpt  cards consulted    DM  insulin as per endo    copd/alice  on home o2  pulm following  cpap at night. pt refuses/ non compliant     cont all prior home meds    dvt ppx    PT           Advanced care planning was discussed with patient and family.  Advanced care planning forms were reviewed and discussed.  Differential diagnosis and plan of care discussed with patient after the evaluation.   Pain assessed and judicious use of narcotics when appropriate was discussed.  Importance of Fall prevention discussed.  Counseling on Smoking and Alcohol cessation was offered when appropriate.  Counseling on Diet, exercise, and medication compliance was done.

## 2020-01-01 NOTE — PROGRESS NOTE ADULT - SUBJECTIVE AND OBJECTIVE BOX
[facility] - HUSSAIN Sanchez / HUSSAIN Villalobos / ERNESTO Mehta/ HUSSAIN Mauricio/ HUSSAIN Barber/ IVONNE Hui / JADEN La / BRANDON Alexander  ---------------------------------------------------------------------------------------------------------------  seen and examined today for ESRD on HD  Interval : NAD  VITALS:  T(F): 99 (01-01-20 @ 04:49), Max: 99.7 (12-31-19 @ 21:19)  HR: 78 (01-01-20 @ 04:49)  BP: 117/63 (01-01-20 @ 04:49)  RR: 18 (01-01-20 @ 04:49)  SpO2: 99% (01-01-20 @ 04:49)  Wt(kg): --    12-31 @ 07:01  -  01-01 @ 07:00  --------------------------------------------------------  IN: 2000 mL / OUT: 5000 mL / NET: -3000 mL      Physical Exam :-  Constitutional: NAD  Neck: Supple.  Respiratory: Bilateral equal breath sounds,  Cardiovascular: S1, S2 normal,  Gastrointestinal: Bowel Sounds present, soft, non tender.  Extremities: 3+ edema  Neurological: Alert and Oriented x 3, no focal deficits  Psychiatric: Normal mood, normal affect  Data:-  Allergies :   aspirin (Swelling)  fish (Unknown)  penicillins (Swelling)  shellfish (Unknown)    Hospital Medications:   MEDICATIONS  (STANDING):  ALBUTerol    90 MICROgram(s) HFA Inhaler 2 Puff(s) Inhalation every 6 hours  ascorbic acid 500 milliGRAM(s) Oral daily  carvedilol 6.25 milliGRAM(s) Oral every 12 hours  chlorhexidine 4% Liquid 1 Application(s) Topical once  clonazePAM  Tablet 0.5 milliGRAM(s) Oral every 12 hours  clopidogrel Tablet 75 milliGRAM(s) Oral daily  dextrose 5%. 1000 milliLiter(s) (50 mL/Hr) IV Continuous <Continuous>  dextrose 50% Injectable 12.5 Gram(s) IV Push once  dextrose 50% Injectable 25 Gram(s) IV Push once  dextrose 50% Injectable 25 Gram(s) IV Push once  epoetin cyndie Injectable 42954 Unit(s) IV Push <User Schedule>  ferrous    sulfate 325 milliGRAM(s) Oral daily  folic acid 1 milliGRAM(s) Oral daily  furosemide    Tablet 80 milliGRAM(s) Oral daily  gabapentin 300 milliGRAM(s) Oral at bedtime  heparin  Injectable 5000 Unit(s) SubCutaneous every 8 hours  insulin lispro (HumaLOG) corrective regimen sliding scale   SubCutaneous three times a day before meals  isosorbide   mononitrate ER Tablet (IMDUR) 30 milliGRAM(s) Oral daily  mirtazapine 7.5 milliGRAM(s) Oral at bedtime  pantoprazole    Tablet 40 milliGRAM(s) Oral before breakfast  PARoxetine 20 milliGRAM(s) Oral daily  polyethylene glycol 3350 17 Gram(s) Oral daily  risperiDONE   Tablet 1 milliGRAM(s) Oral every 12 hours  senna 2 Tablet(s) Oral at bedtime  sevelamer carbonate 2400 milliGRAM(s) Oral three times a day with meals  simvastatin 40 milliGRAM(s) Oral at bedtime    12-31    132<L>  |  88<L>  |  39<H>  ----------------------------<  88  4.7   |  24  |  9.02<H>    Ca    10.1      31 Dec 2019 19:34      Creatinine Trend: 9.02 <--, 6.08 <--, 10.61 <--                        8.9    8.85  )-----------( 364      ( 31 Dec 2019 23:08 )             30.0

## 2020-01-01 NOTE — PROGRESS NOTE ADULT - SUBJECTIVE AND OBJECTIVE BOX
PRESTON JOHNSON  39y Male  MRN:[id number]    Patient is a 39y old  Male who presents with a chief complaint of presyncope (24 Dec 2019 11:58)    HPI:  39 y/o M pt with multiple co morbid conditions including ESRD on hemodialysis 3 x weekly, COPD on home oxygen at baseline, obesity, HTN, DM2, migraine HA, schizophrenia and bipolar disorder, came for eval of AVF due to decreased thrill, then during ED stay RN who found patient slumped over in bed requiring sternal rub.  at time of evaluation. vitals stable. admitted for near syncope work up  Patient receives HD at [facility] via [facility] MWF, missed Sunday holiday scheduled HD (23 Dec 2019 13:24)      Patient seen and evaluated at bedside. No acute events overnight except as noted.    Interval HPI: no events o/n    PAST MEDICAL & SURGICAL HISTORY:  COPD (chronic obstructive pulmonary disease)  Migraine  HTN (hypertension)  DM (diabetes mellitus)  Bipolar disorder  Schizophrenia  ESRD on dialysis  Morbid obesity with BMI of 40.0-44.9, adult  H/O hernia repair      REVIEW OF SYSTEMS:  as per hpi    VITALS:  Vital Signs Last 24 Hrs  T(C): 37 (01 Jan 2020 11:21), Max: 37.6 (31 Dec 2019 21:19)  T(F): 98.6 (01 Jan 2020 11:21), Max: 99.7 (31 Dec 2019 21:19)  HR: 70 (01 Jan 2020 11:21) (70 - 86)  BP: 101/57 (01 Jan 2020 11:21) (101/57 - 142/69)  BP(mean): --  RR: 18 (01 Jan 2020 11:21) (16 - 18)  SpO2: 99% (01 Jan 2020 11:21) (97% - 99%)      PHYSICAL EXAM:  GENERAL: NAD, well-developed, obese, lethargic but arousable   HEAD:  Atraumatic, Normocephalic  EYES: EOMI, PERRLA, conjunctiva and sclera clear  NECK: Supple, No JVD  CHEST/LUNG: Clear to auscultation bilaterally; No wheeze  HEART: S1, S2; No murmurs, rubs, or gallops  ABDOMEN: Soft, Nontender, Nondistended; Bowel sounds present  EXTREMITIES:  2+ Peripheral Pulses, No clubbing, cyanosis,+  edema  PSYCH: Normal affect     Consultant(s) Notes Reviewed:  [x ] YES  [ ] NO  Care Discussed with Consultants/Other Providers [ x] YES  [ ] NO    MEDS:  MEDICATIONS  (STANDING):  ALBUTerol    90 MICROgram(s) HFA Inhaler 2 Puff(s) Inhalation every 6 hours  ascorbic acid 500 milliGRAM(s) Oral daily  carvedilol 6.25 milliGRAM(s) Oral every 12 hours  chlorhexidine 4% Liquid 1 Application(s) Topical once  clonazePAM  Tablet 0.5 milliGRAM(s) Oral every 12 hours  clopidogrel Tablet 75 milliGRAM(s) Oral daily  dextrose 5%. 1000 milliLiter(s) (50 mL/Hr) IV Continuous <Continuous>  dextrose 50% Injectable 12.5 Gram(s) IV Push once  dextrose 50% Injectable 25 Gram(s) IV Push once  dextrose 50% Injectable 25 Gram(s) IV Push once  epoetin cyndie Injectable 25867 Unit(s) IV Push <User Schedule>  ferrous    sulfate 325 milliGRAM(s) Oral daily  folic acid 1 milliGRAM(s) Oral daily  furosemide    Tablet 80 milliGRAM(s) Oral daily  gabapentin 300 milliGRAM(s) Oral at bedtime  heparin  Injectable 5000 Unit(s) SubCutaneous every 8 hours  insulin lispro (HumaLOG) corrective regimen sliding scale   SubCutaneous three times a day before meals  isosorbide   mononitrate ER Tablet (IMDUR) 30 milliGRAM(s) Oral daily  mirtazapine 7.5 milliGRAM(s) Oral at bedtime  pantoprazole    Tablet 40 milliGRAM(s) Oral before breakfast  PARoxetine 20 milliGRAM(s) Oral daily  polyethylene glycol 3350 17 Gram(s) Oral daily  risperiDONE   Tablet 1 milliGRAM(s) Oral every 12 hours  senna 2 Tablet(s) Oral at bedtime  sevelamer carbonate 2400 milliGRAM(s) Oral three times a day with meals  simvastatin 40 milliGRAM(s) Oral at bedtime    MEDICATIONS  (PRN):  dextrose 40% Gel 15 Gram(s) Oral once PRN Blood Glucose LESS THAN 70 milliGRAM(s)/deciliter  glucagon  Injectable 1 milliGRAM(s) IntraMuscular once PRN Glucose LESS THAN 70 milligrams/deciliter  methocarbamol 500 milliGRAM(s) Oral every 12 hours PRN spasms  traMADol 25 milliGRAM(s) Oral every 12 hours PRN Moderate Pain (4 - 6)        ALLERGIES:  aspirin (Swelling)  fish (Unknown)  penicillins (Swelling)  shellfish (Unknown)      LABS:                           8.9    8.85  )-----------( 364      ( 31 Dec 2019 23:08 )             30.0   12-31    132<L>  |  88<L>  |  39<H>  ----------------------------<  88  4.7   |  24  |  9.02<H>    Ca    10.1      31 Dec 2019 19:34      < from: VA Duplex Hemodialysis Access, Left (12.31.19 @ 14:54) >  mpression:     There is a dialysis access fistula created between the variant left radial artery and the egress cephalic vein in the distal upper arm.  There is a degree of stenosis at the surgical anastomosis and a second stenosis of the egress vein just beyond thesurgical anastomosis.  2 patent stents indicate prior revisions of this graft.   Increased flow through the ulnar artery and retrograde flow through the radial artery distal to the surgical anastomosis indicate steal phenomenon.      < end of copied text >             < from: CT Head No Cont (12.23.19 @ 18:03) >    IMPRESSION: Unremarkable noncontrast CT of the brain. No change from 2/21/2019.        < end of copied text >

## 2020-01-02 LAB
ANION GAP SERPL CALC-SCNC: 17 MMOL/L — SIGNIFICANT CHANGE UP (ref 5–17)
BUN SERPL-MCNC: 53 MG/DL — HIGH (ref 7–23)
CALCIUM SERPL-MCNC: 9.9 MG/DL — SIGNIFICANT CHANGE UP (ref 8.4–10.5)
CHLORIDE SERPL-SCNC: 87 MMOL/L — LOW (ref 96–108)
CO2 SERPL-SCNC: 24 MMOL/L — SIGNIFICANT CHANGE UP (ref 22–31)
CREAT SERPL-MCNC: 10.52 MG/DL — HIGH (ref 0.5–1.3)
GLUCOSE BLDC GLUCOMTR-MCNC: 105 MG/DL — HIGH (ref 70–99)
GLUCOSE BLDC GLUCOMTR-MCNC: 119 MG/DL — HIGH (ref 70–99)
GLUCOSE BLDC GLUCOMTR-MCNC: 121 MG/DL — HIGH (ref 70–99)
GLUCOSE BLDC GLUCOMTR-MCNC: 130 MG/DL — HIGH (ref 70–99)
GLUCOSE SERPL-MCNC: 101 MG/DL — HIGH (ref 70–99)
HCT VFR BLD CALC: 29.8 % — LOW (ref 39–50)
HGB BLD-MCNC: 8.6 G/DL — LOW (ref 13–17)
MCHC RBC-ENTMCNC: 27.8 PG — SIGNIFICANT CHANGE UP (ref 27–34)
MCHC RBC-ENTMCNC: 28.9 GM/DL — LOW (ref 32–36)
MCV RBC AUTO: 96.4 FL — SIGNIFICANT CHANGE UP (ref 80–100)
NRBC # BLD: 0 /100 WBCS — SIGNIFICANT CHANGE UP (ref 0–0)
PLATELET # BLD AUTO: 338 K/UL — SIGNIFICANT CHANGE UP (ref 150–400)
POTASSIUM SERPL-MCNC: 5 MMOL/L — SIGNIFICANT CHANGE UP (ref 3.5–5.3)
POTASSIUM SERPL-SCNC: 5 MMOL/L — SIGNIFICANT CHANGE UP (ref 3.5–5.3)
RBC # BLD: 3.09 M/UL — LOW (ref 4.2–5.8)
RBC # FLD: 17.8 % — HIGH (ref 10.3–14.5)
SODIUM SERPL-SCNC: 128 MMOL/L — LOW (ref 135–145)
WBC # BLD: 8.17 K/UL — SIGNIFICANT CHANGE UP (ref 3.8–10.5)
WBC # FLD AUTO: 8.17 K/UL — SIGNIFICANT CHANGE UP (ref 3.8–10.5)

## 2020-01-02 RX ORDER — HYDROMORPHONE HYDROCHLORIDE 2 MG/ML
0.5 INJECTION INTRAMUSCULAR; INTRAVENOUS; SUBCUTANEOUS ONCE
Refills: 0 | Status: DISCONTINUED | OUTPATIENT
Start: 2020-01-02 | End: 2020-01-02

## 2020-01-02 RX ORDER — HYDROMORPHONE HYDROCHLORIDE 2 MG/ML
0.2 INJECTION INTRAMUSCULAR; INTRAVENOUS; SUBCUTANEOUS ONCE
Refills: 0 | Status: DISCONTINUED | OUTPATIENT
Start: 2020-01-02 | End: 2020-01-02

## 2020-01-02 RX ORDER — HYDROMORPHONE HYDROCHLORIDE 2 MG/ML
0.2 INJECTION INTRAMUSCULAR; INTRAVENOUS; SUBCUTANEOUS ONCE
Refills: 0 | Status: DISCONTINUED | OUTPATIENT
Start: 2020-01-02 | End: 2020-01-03

## 2020-01-02 RX ADMIN — ALBUTEROL 2 PUFF(S): 90 AEROSOL, METERED ORAL at 17:26

## 2020-01-02 RX ADMIN — PANTOPRAZOLE SODIUM 40 MILLIGRAM(S): 20 TABLET, DELAYED RELEASE ORAL at 04:55

## 2020-01-02 RX ADMIN — HYDROMORPHONE HYDROCHLORIDE 0.5 MILLIGRAM(S): 2 INJECTION INTRAMUSCULAR; INTRAVENOUS; SUBCUTANEOUS at 01:12

## 2020-01-02 RX ADMIN — HYDROMORPHONE HYDROCHLORIDE 0.2 MILLIGRAM(S): 2 INJECTION INTRAMUSCULAR; INTRAVENOUS; SUBCUTANEOUS at 06:51

## 2020-01-02 RX ADMIN — HEPARIN SODIUM 5000 UNIT(S): 5000 INJECTION INTRAVENOUS; SUBCUTANEOUS at 14:14

## 2020-01-02 RX ADMIN — GABAPENTIN 300 MILLIGRAM(S): 400 CAPSULE ORAL at 22:24

## 2020-01-02 RX ADMIN — Medication 80 MILLIGRAM(S): at 04:55

## 2020-01-02 RX ADMIN — SIMVASTATIN 40 MILLIGRAM(S): 20 TABLET, FILM COATED ORAL at 22:23

## 2020-01-02 RX ADMIN — METHOCARBAMOL 500 MILLIGRAM(S): 500 TABLET, FILM COATED ORAL at 04:54

## 2020-01-02 RX ADMIN — Medication 500 MILLIGRAM(S): at 12:14

## 2020-01-02 RX ADMIN — ISOSORBIDE MONONITRATE 30 MILLIGRAM(S): 60 TABLET, EXTENDED RELEASE ORAL at 22:24

## 2020-01-02 RX ADMIN — CLOPIDOGREL BISULFATE 75 MILLIGRAM(S): 75 TABLET, FILM COATED ORAL at 12:12

## 2020-01-02 RX ADMIN — ERYTHROPOIETIN 20000 UNIT(S): 10000 INJECTION, SOLUTION INTRAVENOUS; SUBCUTANEOUS at 18:53

## 2020-01-02 RX ADMIN — SEVELAMER CARBONATE 2400 MILLIGRAM(S): 2400 POWDER, FOR SUSPENSION ORAL at 12:13

## 2020-01-02 RX ADMIN — HYDROMORPHONE HYDROCHLORIDE 0.5 MILLIGRAM(S): 2 INJECTION INTRAMUSCULAR; INTRAVENOUS; SUBCUTANEOUS at 01:42

## 2020-01-02 RX ADMIN — Medication 325 MILLIGRAM(S): at 12:12

## 2020-01-02 RX ADMIN — CARVEDILOL PHOSPHATE 6.25 MILLIGRAM(S): 80 CAPSULE, EXTENDED RELEASE ORAL at 22:24

## 2020-01-02 RX ADMIN — Medication 0.5 MILLIGRAM(S): at 17:25

## 2020-01-02 RX ADMIN — ALBUTEROL 2 PUFF(S): 90 AEROSOL, METERED ORAL at 04:56

## 2020-01-02 RX ADMIN — HYDROMORPHONE HYDROCHLORIDE 0.2 MILLIGRAM(S): 2 INJECTION INTRAMUSCULAR; INTRAVENOUS; SUBCUTANEOUS at 14:23

## 2020-01-02 RX ADMIN — Medication 1 MILLIGRAM(S): at 12:14

## 2020-01-02 RX ADMIN — SENNA PLUS 2 TABLET(S): 8.6 TABLET ORAL at 22:23

## 2020-01-02 RX ADMIN — MIRTAZAPINE 7.5 MILLIGRAM(S): 45 TABLET, ORALLY DISINTEGRATING ORAL at 22:23

## 2020-01-02 RX ADMIN — HYDROMORPHONE HYDROCHLORIDE 0.2 MILLIGRAM(S): 2 INJECTION INTRAMUSCULAR; INTRAVENOUS; SUBCUTANEOUS at 15:15

## 2020-01-02 RX ADMIN — RISPERIDONE 1 MILLIGRAM(S): 4 TABLET ORAL at 04:55

## 2020-01-02 RX ADMIN — SEVELAMER CARBONATE 2400 MILLIGRAM(S): 2400 POWDER, FOR SUSPENSION ORAL at 07:51

## 2020-01-02 RX ADMIN — CARVEDILOL PHOSPHATE 6.25 MILLIGRAM(S): 80 CAPSULE, EXTENDED RELEASE ORAL at 04:55

## 2020-01-02 RX ADMIN — Medication 0.5 MILLIGRAM(S): at 04:55

## 2020-01-02 RX ADMIN — POLYETHYLENE GLYCOL 3350 17 GRAM(S): 17 POWDER, FOR SOLUTION ORAL at 12:14

## 2020-01-02 RX ADMIN — HYDROMORPHONE HYDROCHLORIDE 0.2 MILLIGRAM(S): 2 INJECTION INTRAMUSCULAR; INTRAVENOUS; SUBCUTANEOUS at 06:21

## 2020-01-02 RX ADMIN — HEPARIN SODIUM 5000 UNIT(S): 5000 INJECTION INTRAVENOUS; SUBCUTANEOUS at 22:24

## 2020-01-02 RX ADMIN — Medication 20 MILLIGRAM(S): at 12:12

## 2020-01-02 RX ADMIN — HEPARIN SODIUM 5000 UNIT(S): 5000 INJECTION INTRAVENOUS; SUBCUTANEOUS at 04:55

## 2020-01-02 RX ADMIN — RISPERIDONE 1 MILLIGRAM(S): 4 TABLET ORAL at 22:24

## 2020-01-02 RX ADMIN — ALBUTEROL 2 PUFF(S): 90 AEROSOL, METERED ORAL at 12:25

## 2020-01-02 NOTE — PROGRESS NOTE ADULT - SUBJECTIVE AND OBJECTIVE BOX
PRESTON JOHNSON  39y Male  MRN:[id number]    Patient is a 39y old  Male who presents with a chief complaint of presyncope (24 Dec 2019 11:58)    HPI:  39 y/o M pt with multiple co morbid conditions including ESRD on hemodialysis 3 x weekly, COPD on home oxygen at baseline, obesity, HTN, DM2, migraine HA, schizophrenia and bipolar disorder, came for eval of AVF due to decreased thrill, then during ED stay RN who found patient slumped over in bed requiring sternal rub.  at time of evaluation. vitals stable. admitted for near syncope work up  Patient receives HD at [facility] via [facility] MWF, missed Sunday holiday scheduled HD (23 Dec 2019 13:24)      Patient seen and evaluated at bedside. No acute events overnight except as noted.    Interval HPI: no events o/n    PAST MEDICAL & SURGICAL HISTORY:  COPD (chronic obstructive pulmonary disease)  Migraine  HTN (hypertension)  DM (diabetes mellitus)  Bipolar disorder  Schizophrenia  ESRD on dialysis  Morbid obesity with BMI of 40.0-44.9, adult  H/O hernia repair      REVIEW OF SYSTEMS:  as per hpi    VITALS:  Vital Signs Last 24 Hrs  T(C): 36.7 (02 Jan 2020 18:01), Max: 37.4 (02 Jan 2020 04:19)  T(F): 98 (02 Jan 2020 18:01), Max: 99.3 (02 Jan 2020 04:19)  HR: 79 (02 Jan 2020 18:01) (76 - 90)  BP: 126/63 (02 Jan 2020 18:01) (114/61 - 144/76)  BP(mean): --  RR: 19 (02 Jan 2020 18:01) (18 - 19)  SpO2: 94% (02 Jan 2020 18:01) (94% - 100%)      PHYSICAL EXAM:  GENERAL: NAD, well-developed, obese, lethargic but arousable   HEAD:  Atraumatic, Normocephalic  EYES: EOMI, PERRLA, conjunctiva and sclera clear  NECK: Supple, No JVD  CHEST/LUNG: Clear to auscultation bilaterally; No wheeze  HEART: S1, S2; No murmurs, rubs, or gallops  ABDOMEN: Soft, Nontender, Nondistended; Bowel sounds present  EXTREMITIES:  2+ Peripheral Pulses, No clubbing, cyanosis,+  edema  PSYCH: Normal affect     Consultant(s) Notes Reviewed:  [x ] YES  [ ] NO  Care Discussed with Consultants/Other Providers [ x] YES  [ ] NO    MEDS:  MEDICATIONS  (STANDING):  ALBUTerol    90 MICROgram(s) HFA Inhaler 2 Puff(s) Inhalation every 6 hours  ascorbic acid 500 milliGRAM(s) Oral daily  carvedilol 6.25 milliGRAM(s) Oral every 12 hours  chlorhexidine 4% Liquid 1 Application(s) Topical once  clonazePAM  Tablet 0.5 milliGRAM(s) Oral every 12 hours  clopidogrel Tablet 75 milliGRAM(s) Oral daily  dextrose 5%. 1000 milliLiter(s) (50 mL/Hr) IV Continuous <Continuous>  dextrose 50% Injectable 12.5 Gram(s) IV Push once  dextrose 50% Injectable 25 Gram(s) IV Push once  dextrose 50% Injectable 25 Gram(s) IV Push once  epoetin cyndie Injectable 92066 Unit(s) IV Push <User Schedule>  ferrous    sulfate 325 milliGRAM(s) Oral daily  folic acid 1 milliGRAM(s) Oral daily  furosemide    Tablet 80 milliGRAM(s) Oral daily  gabapentin 300 milliGRAM(s) Oral at bedtime  heparin  Injectable 5000 Unit(s) SubCutaneous every 8 hours  insulin lispro (HumaLOG) corrective regimen sliding scale   SubCutaneous three times a day before meals  isosorbide   mononitrate ER Tablet (IMDUR) 30 milliGRAM(s) Oral daily  mirtazapine 7.5 milliGRAM(s) Oral at bedtime  pantoprazole    Tablet 40 milliGRAM(s) Oral before breakfast  PARoxetine 20 milliGRAM(s) Oral daily  polyethylene glycol 3350 17 Gram(s) Oral daily  risperiDONE   Tablet 1 milliGRAM(s) Oral every 12 hours  senna 2 Tablet(s) Oral at bedtime  sevelamer carbonate 2400 milliGRAM(s) Oral three times a day with meals  simvastatin 40 milliGRAM(s) Oral at bedtime        ALLERGIES:  aspirin (Swelling)  fish (Unknown)  penicillins (Swelling)  shellfish (Unknown)      LABS:                                             8.9    8.85  )-----------( 364      ( 31 Dec 2019 23:08 )             30.0   12-31    132<L>  |  88<L>  |  39<H>  ----------------------------<  88  4.7   |  24  |  9.02<H>    Ca    10.1      31 Dec 2019 19:34        < from: VA Duplex Hemodialysis Access, Left (12.31.19 @ 14:54) >  mpression:     There is a dialysis access fistula created between the variant left radial artery and the egress cephalic vein in the distal upper arm.  There is a degree of stenosis at the surgical anastomosis and a second stenosis of the egress vein just beyond thesurgical anastomosis.  2 patent stents indicate prior revisions of this graft.   Increased flow through the ulnar artery and retrograde flow through the radial artery distal to the surgical anastomosis indicate steal phenomenon.      < end of copied text >             < from: CT Head No Cont (12.23.19 @ 18:03) >    IMPRESSION: Unremarkable noncontrast CT of the brain. No change from 2/21/2019.        < end of copied text >

## 2020-01-02 NOTE — PROGRESS NOTE ADULT - PROBLEM SELECTOR PLAN 1
Due for UF today and HD tomorrow  Patient noted to have large clots on proximal side of AVF  On USG found to have collapsed/stenotic stent with large clot formed around it in vessel lumen, spoke with Vascular surgery to inform that patient getting inadequate HD because nursing is forced to place needles very proximal to each other. Recs appreciated.  Dose meds for ESRD  Daily BMP

## 2020-01-02 NOTE — PROGRESS NOTE ADULT - ASSESSMENT
39 yo male extensive pmhx as noted above including esrd on hd, copd on o2, obesity, htn, dm, migraine, schizo, bipolar, a/w presyncope and fluid overload    fluid overload  renal consulted  plan for HD as per renal      clotted avf  vasc eval    dopplers noted  f/u with vasc re possible fistulogram vs IR intervention    presyncope   CT head noted   check echo - ok to do outpt  cards consulted    DM  insulin as per endo    copd/alice  on home o2  pulm following  cpap at night. pt refuses/ non compliant     cont all prior home meds    dvt ppx    PT           Advanced care planning was discussed with patient and family.  Advanced care planning forms were reviewed and discussed.  Differential diagnosis and plan of care discussed with patient after the evaluation.   Pain assessed and judicious use of narcotics when appropriate was discussed.  Importance of Fall prevention discussed.  Counseling on Smoking and Alcohol cessation was offered when appropriate.  Counseling on Diet, exercise, and medication compliance was done.

## 2020-01-02 NOTE — PROGRESS NOTE ADULT - SUBJECTIVE AND OBJECTIVE BOX
[facility] - HUSSAIN Sanchez / HUSSAIN Villalobos / ERNESTO Mehta/ HUSSAIN Mauricio/ HUSSAIN Barber/ IVONNE Hui / JADEN La / BRANDON Alexander  ---------------------------------------------------------------------------------------------------------------  seen and examined today for ESRD  Interval : plan for UF today  VITALS:  T(F): 99.3 (01-02-20 @ 04:19), Max: 99.3 (01-02-20 @ 04:19)  HR: 80 (01-02-20 @ 04:19)  BP: 144/76 (01-02-20 @ 04:19)  RR: 19 (01-02-20 @ 04:19)  SpO2: 100% (01-02-20 @ 04:19)  Wt(kg): --    01-01 @ 07:01  -  01-02 @ 07:00  --------------------------------------------------------  IN: 600 mL / OUT: 0 mL / NET: 600 mL      Physical Exam :-  Constitutional: NAD  Neck: Supple.  Respiratory: Bilateral equal breath sounds,  Cardiovascular: S1, S2 normal,  Gastrointestinal: Bowel Sounds present, soft, non tender.  Extremities: 2+ edema  Neurological: Alert and Oriented x 3, no focal deficits  Psychiatric: Normal mood, normal affect  Data:-  Allergies :   aspirin (Swelling)  fish (Unknown)  penicillins (Swelling)  shellfish (Unknown)    Hospital Medications:   MEDICATIONS  (STANDING):  ALBUTerol    90 MICROgram(s) HFA Inhaler 2 Puff(s) Inhalation every 6 hours  ascorbic acid 500 milliGRAM(s) Oral daily  carvedilol 6.25 milliGRAM(s) Oral every 12 hours  chlorhexidine 4% Liquid 1 Application(s) Topical once  clonazePAM  Tablet 0.5 milliGRAM(s) Oral every 12 hours  clopidogrel Tablet 75 milliGRAM(s) Oral daily  dextrose 5%. 1000 milliLiter(s) (50 mL/Hr) IV Continuous <Continuous>  dextrose 50% Injectable 12.5 Gram(s) IV Push once  dextrose 50% Injectable 25 Gram(s) IV Push once  dextrose 50% Injectable 25 Gram(s) IV Push once  epoetin cyndie Injectable 74180 Unit(s) IV Push <User Schedule>  ferrous    sulfate 325 milliGRAM(s) Oral daily  folic acid 1 milliGRAM(s) Oral daily  furosemide    Tablet 80 milliGRAM(s) Oral daily  gabapentin 300 milliGRAM(s) Oral at bedtime  heparin  Injectable 5000 Unit(s) SubCutaneous every 8 hours  insulin lispro (HumaLOG) corrective regimen sliding scale   SubCutaneous three times a day before meals  isosorbide   mononitrate ER Tablet (IMDUR) 30 milliGRAM(s) Oral daily  mirtazapine 7.5 milliGRAM(s) Oral at bedtime  pantoprazole    Tablet 40 milliGRAM(s) Oral before breakfast  PARoxetine 20 milliGRAM(s) Oral daily  polyethylene glycol 3350 17 Gram(s) Oral daily  risperiDONE   Tablet 1 milliGRAM(s) Oral every 12 hours  senna 2 Tablet(s) Oral at bedtime  sevelamer carbonate 2400 milliGRAM(s) Oral three times a day with meals  simvastatin 40 milliGRAM(s) Oral at bedtime    12-31    132<L>  |  88<L>  |  39<H>  ----------------------------<  88  4.7   |  24  |  9.02<H>    Ca    10.1      31 Dec 2019 19:34      Creatinine Trend: 9.02 <--, 6.08 <--, 10.61 <--                        8.9    8.85  )-----------( 364      ( 31 Dec 2019 23:08 )             30.0

## 2020-01-02 NOTE — PROGRESS NOTE ADULT - SUBJECTIVE AND OBJECTIVE BOX
Follow-up Pulm Progress Note  Manuelito Mendez MD  [phone]    Stable cardio-resp status  Afebrile and hemodynamically stable  Sleeping comfortably on nasal oxygen  No resp issues at this time    Vital Signs Last 24 Hrs  T(C): 37.4 (02 Jan 2020 04:19), Max: 37.4 (02 Jan 2020 04:19)  T(F): 99.3 (02 Jan 2020 04:19), Max: 99.3 (02 Jan 2020 04:19)  HR: 80 (02 Jan 2020 04:19) (70 - 99)  BP: 144/76 (02 Jan 2020 04:19) (101/57 - 144/76)  BP(mean): --  RR: 19 (02 Jan 2020 04:19) (18 - 19)  SpO2: 100% (02 Jan 2020 04:19) (98% - 100%)                       8.9    8.85  )-----------( 364      ( 31 Dec 2019 23:08 )             30.0       12-31    132<L>  |  88<L>  |  39<H>  ----------------------------<  88  4.7   |  24  |  9.02<H>    Ca    10.1      31 Dec 2019 19:34      Physical Examination:  PULM: No wheeze or rhonchi  CVS: Regular rate and rhythm, no murmurs, rubs, or gallops  ABD: Soft, non-tender  EXT:  No clubbing, cyanosis, or edema    RADIOLOGY REVIEWED  CXR:    CT chest:    TTE:

## 2020-01-02 NOTE — PROGRESS NOTE ADULT - SUBJECTIVE AND OBJECTIVE BOX
Subjective: Patient seen and examined. No new events except as noted.   resting comfortable     REVIEW OF SYSTEMS:    CONSTITUTIONAL: + weakness, fevers or chills  EYES/ENT: No visual changes;  No vertigo or throat pain   NECK: No pain or stiffness  RESPIRATORY: No cough, wheezing, hemoptysis; No shortness of breath  CARDIOVASCULAR: No chest pain or palpitations  GASTROINTESTINAL: No abdominal or epigastric pain. No nausea, vomiting, or hematemesis; No diarrhea or constipation. No melena or hematochezia.  GENITOURINARY: No dysuria, frequency or hematuria  NEUROLOGICAL: No numbness or weakness  SKIN: No itching, burning, rashes, or lesions   All other review of systems is negative unless indicated above.    MEDICATIONS:  MEDICATIONS  (STANDING):  ALBUTerol    90 MICROgram(s) HFA Inhaler 2 Puff(s) Inhalation every 6 hours  ascorbic acid 500 milliGRAM(s) Oral daily  carvedilol 6.25 milliGRAM(s) Oral every 12 hours  chlorhexidine 4% Liquid 1 Application(s) Topical once  clonazePAM  Tablet 0.5 milliGRAM(s) Oral every 12 hours  clopidogrel Tablet 75 milliGRAM(s) Oral daily  dextrose 5%. 1000 milliLiter(s) (50 mL/Hr) IV Continuous <Continuous>  dextrose 50% Injectable 12.5 Gram(s) IV Push once  dextrose 50% Injectable 25 Gram(s) IV Push once  dextrose 50% Injectable 25 Gram(s) IV Push once  epoetin cyndie Injectable 88146 Unit(s) IV Push <User Schedule>  ferrous    sulfate 325 milliGRAM(s) Oral daily  folic acid 1 milliGRAM(s) Oral daily  furosemide    Tablet 80 milliGRAM(s) Oral daily  gabapentin 300 milliGRAM(s) Oral at bedtime  heparin  Injectable 5000 Unit(s) SubCutaneous every 8 hours  insulin lispro (HumaLOG) corrective regimen sliding scale   SubCutaneous three times a day before meals  isosorbide   mononitrate ER Tablet (IMDUR) 30 milliGRAM(s) Oral daily  mirtazapine 7.5 milliGRAM(s) Oral at bedtime  pantoprazole    Tablet 40 milliGRAM(s) Oral before breakfast  PARoxetine 20 milliGRAM(s) Oral daily  polyethylene glycol 3350 17 Gram(s) Oral daily  risperiDONE   Tablet 1 milliGRAM(s) Oral every 12 hours  senna 2 Tablet(s) Oral at bedtime  sevelamer carbonate 2400 milliGRAM(s) Oral three times a day with meals  simvastatin 40 milliGRAM(s) Oral at bedtime      PHYSICAL EXAM:  T(C): 37.4 (01-02-20 @ 04:19), Max: 37.4 (01-02-20 @ 04:19)  HR: 80 (01-02-20 @ 04:19) (70 - 99)  BP: 144/76 (01-02-20 @ 04:19) (101/57 - 144/76)  RR: 19 (01-02-20 @ 04:19) (18 - 19)  SpO2: 100% (01-02-20 @ 04:19) (98% - 100%)  Wt(kg): --  I&O's Summary    01 Jan 2020 07:01  -  02 Jan 2020 07:00  --------------------------------------------------------  IN: 600 mL / OUT: 0 mL / NET: 600 mL          Appearance: Morbidly obese 	  HEENT:  Dry  oral mucosa, PERRL, EOMI	  Lymphatic: No lymphadenopathy , no edema  Cardiovascular: Normal S1 S2, No JVD, No murmurs , Peripheral pulses palpable 2+ bilaterally  Respiratory: Lungs clear to auscultation, normal effort 	  Gastrointestinal:  Soft, Non-tender, + BS	  Skin: No rashes, No ecchymoses, No cyanosis, warm to touch  Musculoskeletal: Normal range of motion, normal strength  Psychiatry:  Mood & affect appropriate  Ext: No edema      LABS:    CARDIAC MARKERS:                                8.9    8.85  )-----------( 364      ( 31 Dec 2019 23:08 )             30.0     12-31    132<L>  |  88<L>  |  39<H>  ----------------------------<  88  4.7   |  24  |  9.02<H>    Ca    10.1      31 Dec 2019 19:34      proBNP:   Lipid Profile:   HgA1c:   TSH:             TELEMETRY: 	    ECG:  	  RADIOLOGY:   DIAGNOSTIC TESTING:  [ ] Echocardiogram:  [ ]  Catheterization:  [ ] Stress Test:    OTHER:

## 2020-01-03 ENCOUNTER — APPOINTMENT (OUTPATIENT)
Dept: VASCULAR SURGERY | Facility: CLINIC | Age: 40
End: 2020-01-03

## 2020-01-03 LAB
ANION GAP SERPL CALC-SCNC: 17 MMOL/L — SIGNIFICANT CHANGE UP (ref 5–17)
BUN SERPL-MCNC: 58 MG/DL — HIGH (ref 7–23)
CALCIUM SERPL-MCNC: 9.7 MG/DL — SIGNIFICANT CHANGE UP (ref 8.4–10.5)
CHLORIDE SERPL-SCNC: 86 MMOL/L — LOW (ref 96–108)
CO2 SERPL-SCNC: 24 MMOL/L — SIGNIFICANT CHANGE UP (ref 22–31)
CREAT SERPL-MCNC: 11.31 MG/DL — HIGH (ref 0.5–1.3)
GLUCOSE BLDC GLUCOMTR-MCNC: 102 MG/DL — HIGH (ref 70–99)
GLUCOSE BLDC GLUCOMTR-MCNC: 132 MG/DL — HIGH (ref 70–99)
GLUCOSE BLDC GLUCOMTR-MCNC: 135 MG/DL — HIGH (ref 70–99)
GLUCOSE BLDC GLUCOMTR-MCNC: 175 MG/DL — HIGH (ref 70–99)
GLUCOSE SERPL-MCNC: 129 MG/DL — HIGH (ref 70–99)
HCT VFR BLD CALC: 28.5 % — LOW (ref 39–50)
HGB BLD-MCNC: 8.4 G/DL — LOW (ref 13–17)
MCHC RBC-ENTMCNC: 28.3 PG — SIGNIFICANT CHANGE UP (ref 27–34)
MCHC RBC-ENTMCNC: 29.5 GM/DL — LOW (ref 32–36)
MCV RBC AUTO: 96 FL — SIGNIFICANT CHANGE UP (ref 80–100)
PLATELET # BLD AUTO: 329 K/UL — SIGNIFICANT CHANGE UP (ref 150–400)
POTASSIUM SERPL-MCNC: 5 MMOL/L — SIGNIFICANT CHANGE UP (ref 3.5–5.3)
POTASSIUM SERPL-SCNC: 5 MMOL/L — SIGNIFICANT CHANGE UP (ref 3.5–5.3)
RBC # BLD: 2.97 M/UL — LOW (ref 4.2–5.8)
RBC # FLD: 18 % — HIGH (ref 10.3–14.5)
SODIUM SERPL-SCNC: 127 MMOL/L — LOW (ref 135–145)
WBC # BLD: 8.09 K/UL — SIGNIFICANT CHANGE UP (ref 3.8–10.5)
WBC # FLD AUTO: 8.09 K/UL — SIGNIFICANT CHANGE UP (ref 3.8–10.5)

## 2020-01-03 RX ORDER — HYDROMORPHONE HYDROCHLORIDE 2 MG/ML
0.1 INJECTION INTRAMUSCULAR; INTRAVENOUS; SUBCUTANEOUS ONCE
Refills: 0 | Status: DISCONTINUED | OUTPATIENT
Start: 2020-01-03 | End: 2020-01-03

## 2020-01-03 RX ADMIN — ALBUTEROL 2 PUFF(S): 90 AEROSOL, METERED ORAL at 06:06

## 2020-01-03 RX ADMIN — MIRTAZAPINE 7.5 MILLIGRAM(S): 45 TABLET, ORALLY DISINTEGRATING ORAL at 21:48

## 2020-01-03 RX ADMIN — HYDROMORPHONE HYDROCHLORIDE 0.2 MILLIGRAM(S): 2 INJECTION INTRAMUSCULAR; INTRAVENOUS; SUBCUTANEOUS at 00:03

## 2020-01-03 RX ADMIN — HYDROMORPHONE HYDROCHLORIDE 0.1 MILLIGRAM(S): 2 INJECTION INTRAMUSCULAR; INTRAVENOUS; SUBCUTANEOUS at 21:46

## 2020-01-03 RX ADMIN — HEPARIN SODIUM 5000 UNIT(S): 5000 INJECTION INTRAVENOUS; SUBCUTANEOUS at 18:14

## 2020-01-03 RX ADMIN — GABAPENTIN 300 MILLIGRAM(S): 400 CAPSULE ORAL at 21:48

## 2020-01-03 RX ADMIN — HYDROMORPHONE HYDROCHLORIDE 0.2 MILLIGRAM(S): 2 INJECTION INTRAMUSCULAR; INTRAVENOUS; SUBCUTANEOUS at 00:33

## 2020-01-03 RX ADMIN — HYDROMORPHONE HYDROCHLORIDE 0.1 MILLIGRAM(S): 2 INJECTION INTRAMUSCULAR; INTRAVENOUS; SUBCUTANEOUS at 18:48

## 2020-01-03 RX ADMIN — RISPERIDONE 1 MILLIGRAM(S): 4 TABLET ORAL at 06:01

## 2020-01-03 RX ADMIN — SIMVASTATIN 40 MILLIGRAM(S): 20 TABLET, FILM COATED ORAL at 21:49

## 2020-01-03 RX ADMIN — PANTOPRAZOLE SODIUM 40 MILLIGRAM(S): 20 TABLET, DELAYED RELEASE ORAL at 06:01

## 2020-01-03 RX ADMIN — SEVELAMER CARBONATE 2400 MILLIGRAM(S): 2400 POWDER, FOR SUSPENSION ORAL at 18:14

## 2020-01-03 RX ADMIN — ALBUTEROL 2 PUFF(S): 90 AEROSOL, METERED ORAL at 18:13

## 2020-01-03 RX ADMIN — Medication 20 MILLIGRAM(S): at 18:13

## 2020-01-03 RX ADMIN — ISOSORBIDE MONONITRATE 30 MILLIGRAM(S): 60 TABLET, EXTENDED RELEASE ORAL at 18:13

## 2020-01-03 RX ADMIN — HEPARIN SODIUM 5000 UNIT(S): 5000 INJECTION INTRAVENOUS; SUBCUTANEOUS at 06:01

## 2020-01-03 RX ADMIN — Medication 500 MILLIGRAM(S): at 18:13

## 2020-01-03 RX ADMIN — Medication 1 MILLIGRAM(S): at 18:13

## 2020-01-03 RX ADMIN — RISPERIDONE 1 MILLIGRAM(S): 4 TABLET ORAL at 18:13

## 2020-01-03 RX ADMIN — Medication 325 MILLIGRAM(S): at 18:12

## 2020-01-03 RX ADMIN — CLOPIDOGREL BISULFATE 75 MILLIGRAM(S): 75 TABLET, FILM COATED ORAL at 18:13

## 2020-01-03 RX ADMIN — CARVEDILOL PHOSPHATE 6.25 MILLIGRAM(S): 80 CAPSULE, EXTENDED RELEASE ORAL at 06:01

## 2020-01-03 RX ADMIN — SEVELAMER CARBONATE 2400 MILLIGRAM(S): 2400 POWDER, FOR SUSPENSION ORAL at 10:08

## 2020-01-03 RX ADMIN — Medication 0.5 MILLIGRAM(S): at 06:01

## 2020-01-03 RX ADMIN — SENNA PLUS 2 TABLET(S): 8.6 TABLET ORAL at 21:48

## 2020-01-03 RX ADMIN — CARVEDILOL PHOSPHATE 6.25 MILLIGRAM(S): 80 CAPSULE, EXTENDED RELEASE ORAL at 18:12

## 2020-01-03 RX ADMIN — Medication 0.5 MILLIGRAM(S): at 18:17

## 2020-01-03 RX ADMIN — Medication 80 MILLIGRAM(S): at 06:01

## 2020-01-03 NOTE — PROGRESS NOTE ADULT - ASSESSMENT
39 y/o M pt with multiple co morbid conditions including ESRD on hemodialysis 3 x weekly, COPD on home oxygen at baseline, obesity, HTN, DM2, migraine HA, schizophrenia and bipolar disorder, came for eval of AVF due to decreased thrill, then during ED stay RN who found patient slumped over in bed requiring sternal rub.  at time of evaluation. vitals stable. admitted for near syncope work up  Patient receives HD at [facility] via [facility] MWF, missed Ch[place] holiday scheduled HD

## 2020-01-03 NOTE — PROGRESS NOTE ADULT - SUBJECTIVE AND OBJECTIVE BOX
Subjective: Patient seen and examined. No new events except as noted.     REVIEW OF SYSTEMS:    +weakness, fevers or chills  EYES/ENT: No visual changes;  No vertigo or throat pain   NECK: No pain or stiffness  RESPIRATORY: No cough, wheezing, hemoptysis; No shortness of breath  CARDIOVASCULAR: No chest pain or palpitations  GASTROINTESTINAL: No abdominal or epigastric pain. No nausea, vomiting, or hematemesis; No diarrhea or constipation. No melena or hematochezia.  GENITOURINARY: No dysuria, frequency or hematuria  NEUROLOGICAL: No numbness or weakness  SKIN: No itching, burning, rashes, or lesions   All other review of systems is negative unless indicated above.    MEDICATIONS:  MEDICATIONS  (STANDING):  ALBUTerol    90 MICROgram(s) HFA Inhaler 2 Puff(s) Inhalation every 6 hours  ascorbic acid 500 milliGRAM(s) Oral daily  carvedilol 6.25 milliGRAM(s) Oral every 12 hours  chlorhexidine 4% Liquid 1 Application(s) Topical once  clonazePAM  Tablet 0.5 milliGRAM(s) Oral every 12 hours  clopidogrel Tablet 75 milliGRAM(s) Oral daily  dextrose 5%. 1000 milliLiter(s) (50 mL/Hr) IV Continuous <Continuous>  dextrose 50% Injectable 12.5 Gram(s) IV Push once  dextrose 50% Injectable 25 Gram(s) IV Push once  dextrose 50% Injectable 25 Gram(s) IV Push once  epoetin cyndie Injectable 17628 Unit(s) IV Push <User Schedule>  ferrous    sulfate 325 milliGRAM(s) Oral daily  folic acid 1 milliGRAM(s) Oral daily  furosemide    Tablet 80 milliGRAM(s) Oral daily  gabapentin 300 milliGRAM(s) Oral at bedtime  heparin  Injectable 5000 Unit(s) SubCutaneous every 8 hours  insulin lispro (HumaLOG) corrective regimen sliding scale   SubCutaneous three times a day before meals  isosorbide   mononitrate ER Tablet (IMDUR) 30 milliGRAM(s) Oral daily  mirtazapine 7.5 milliGRAM(s) Oral at bedtime  pantoprazole    Tablet 40 milliGRAM(s) Oral before breakfast  PARoxetine 20 milliGRAM(s) Oral daily  polyethylene glycol 3350 17 Gram(s) Oral daily  risperiDONE   Tablet 1 milliGRAM(s) Oral every 12 hours  senna 2 Tablet(s) Oral at bedtime  sevelamer carbonate 2400 milliGRAM(s) Oral three times a day with meals  simvastatin 40 milliGRAM(s) Oral at bedtime      PHYSICAL EXAM:  T(C): 37.1 (01-03-20 @ 11:57), Max: 37.1 (01-03-20 @ 11:57)  HR: 77 (01-03-20 @ 11:57) (76 - 88)  BP: 96/51 (01-03-20 @ 11:57) (96/51 - 134/55)  RR: 17 (01-03-20 @ 11:57) (16 - 20)  SpO2: 99% (01-03-20 @ 11:57) (93% - 99%)  Wt(kg): --  I&O's Summary    02 Jan 2020 07:01  -  03 Jan 2020 07:00  --------------------------------------------------------  IN: 1780 mL / OUT: 3600 mL / NET: -1820 mL          Appearance: Morbidly obese   HEENT:   Dry  oral mucosa, PERRL, EOMI	  Lymphatic: No lymphadenopathy , no edema  Cardiovascular: Normal S1 S2, No JVD, No murmurs , Peripheral pulses palpable 2+ bilaterally  Respiratory: Lungs clear to auscultation, normal effort 	  Gastrointestinal:  Soft, Non-tender, + BS	  Skin: No rashes, No ecchymoses, No cyanosis, warm to touch  Musculoskeletal: Normal range of motion, normal strength  Psychiatry:  Mood & affect appropriate  Ext: No edema      LABS:    CARDIAC MARKERS:                                8.6    8.17  )-----------( 338      ( 02 Jan 2020 20:34 )             29.8     01-02    128<L>  |  87<L>  |  53<H>  ----------------------------<  101<H>  5.0   |  24  |  10.52<H>    Ca    9.9      02 Jan 2020 20:34      proBNP:   Lipid Profile:   HgA1c:   TSH:             TELEMETRY: 	    ECG:  	  RADIOLOGY:   DIAGNOSTIC TESTING:  [ ] Echocardiogram:  [ ]  Catheterization:  [ ] Stress Test:    OTHER:

## 2020-01-03 NOTE — PROGRESS NOTE ADULT - ASSESSMENT
39 yo male extensive pmhx as noted above including esrd on hd, copd on o2, obesity, htn, dm, migraine, schizo, bipolar, a/w presyncope and fluid overload    fluid overload  renal consulted  plan for HD as per renal      problems with access   vasc eval  noted   dopplers noted  as per d/w vasc, ir, and renal, to f/u as outpt for possible fistulogram    presyncope   CT head noted   check echo - ok to do outpt  cards consulted    DM  insulin as per endo    copd/alice  on home o2  pulm following  cpap at night. pt refuses/ non compliant     cont all prior home meds    dvt ppx    PT     dc  to rehab today        Advanced care planning was discussed with patient and family.  Advanced care planning forms were reviewed and discussed.  Differential diagnosis and plan of care discussed with patient after the evaluation.   Pain assessed and judicious use of narcotics when appropriate was discussed.  Importance of Fall prevention discussed.  Counseling on Smoking and Alcohol cessation was offered when appropriate.  Counseling on Diet, exercise, and medication compliance was done. 39 yo male extensive pmhx as noted above including esrd on hd, copd on o2, obesity, htn, dm, migraine, schizo, bipolar, a/w presyncope and fluid overload    fluid overload  renal consulted  plan for HD as per renal      occasional problems with access   vasc eval  noted   dopplers noted  as per d/w vasc, ir, and renal, to f/u as outpt for possible fistulogram  access working ok today during HD session    presyncope   CT head noted   check echo - ok to do outpt  cards consulted    DM  insulin as per endo    copd/alice  on home o2  pulm following  cpap at night. pt refuses/ non compliant     cont all prior home meds    dvt ppx    PT     dc  to rehab today        Advanced care planning was discussed with patient and family.  Advanced care planning forms were reviewed and discussed.  Differential diagnosis and plan of care discussed with patient after the evaluation.   Pain assessed and judicious use of narcotics when appropriate was discussed.  Importance of Fall prevention discussed.  Counseling on Smoking and Alcohol cessation was offered when appropriate.  Counseling on Diet, exercise, and medication compliance was done.

## 2020-01-03 NOTE — PROGRESS NOTE ADULT - PROBLEM SELECTOR PLAN 1
Tolerating HD well today  Spoke with Dr. Foote, prefers to take care of patient in office  stable for DC

## 2020-01-03 NOTE — PROGRESS NOTE ADULT - SUBJECTIVE AND OBJECTIVE BOX
[facility] - HUSSAIN Sanchez / HUSSAIN Villalobos / ERNESTO Mehta/ HUSSAIN Mauricio/ HUSSAIN Barber/ IVONNE Hui / JADEN La / BRANDON Alexander  ---------------------------------------------------------------------------------------------------------------  seen and examined today for ESRD  Interval : tolerating HD well today  VITALS:  T(F): 98.7 (01-03-20 @ 11:57), Max: 98.7 (01-03-20 @ 11:57)  HR: 77 (01-03-20 @ 11:57)  BP: 96/51 (01-03-20 @ 11:57)  RR: 17 (01-03-20 @ 11:57)  SpO2: 99% (01-03-20 @ 11:57)  Wt(kg): --    01-02 @ 07:01  -  01-03 @ 07:00  --------------------------------------------------------  IN: 1780 mL / OUT: 3600 mL / NET: -1820 mL      Physical Exam :-  Constitutional: NAD  Neck: Supple.  Respiratory: Bilateral equal breath sounds,  Cardiovascular: S1, S2 normal,  Gastrointestinal: Bowel Sounds present, soft, non tender.  Extremities: 3+ edema  Neurological: Alert and Oriented x 3, no focal deficits  Psychiatric: Normal mood, normal affect  Data:-  Allergies :   aspirin (Swelling)  fish (Unknown)  penicillins (Swelling)  shellfish (Unknown)    Hospital Medications:   MEDICATIONS  (STANDING):  ALBUTerol    90 MICROgram(s) HFA Inhaler 2 Puff(s) Inhalation every 6 hours  ascorbic acid 500 milliGRAM(s) Oral daily  carvedilol 6.25 milliGRAM(s) Oral every 12 hours  chlorhexidine 4% Liquid 1 Application(s) Topical once  clonazePAM  Tablet 0.5 milliGRAM(s) Oral every 12 hours  clopidogrel Tablet 75 milliGRAM(s) Oral daily  dextrose 5%. 1000 milliLiter(s) (50 mL/Hr) IV Continuous <Continuous>  dextrose 50% Injectable 12.5 Gram(s) IV Push once  dextrose 50% Injectable 25 Gram(s) IV Push once  dextrose 50% Injectable 25 Gram(s) IV Push once  epoetin cyndie Injectable 37155 Unit(s) IV Push <User Schedule>  ferrous    sulfate 325 milliGRAM(s) Oral daily  folic acid 1 milliGRAM(s) Oral daily  furosemide    Tablet 80 milliGRAM(s) Oral daily  gabapentin 300 milliGRAM(s) Oral at bedtime  heparin  Injectable 5000 Unit(s) SubCutaneous every 8 hours  insulin lispro (HumaLOG) corrective regimen sliding scale   SubCutaneous three times a day before meals  isosorbide   mononitrate ER Tablet (IMDUR) 30 milliGRAM(s) Oral daily  mirtazapine 7.5 milliGRAM(s) Oral at bedtime  pantoprazole    Tablet 40 milliGRAM(s) Oral before breakfast  PARoxetine 20 milliGRAM(s) Oral daily  polyethylene glycol 3350 17 Gram(s) Oral daily  risperiDONE   Tablet 1 milliGRAM(s) Oral every 12 hours  senna 2 Tablet(s) Oral at bedtime  sevelamer carbonate 2400 milliGRAM(s) Oral three times a day with meals  simvastatin 40 milliGRAM(s) Oral at bedtime    01-02    128<L>  |  87<L>  |  53<H>  ----------------------------<  101<H>  5.0   |  24  |  10.52<H>    Ca    9.9      02 Jan 2020 20:34      Creatinine Trend: 10.52 <--, 9.02 <--, 6.08 <--                        8.6    8.17  )-----------( 338      ( 02 Jan 2020 20:34 )             29.8

## 2020-01-03 NOTE — PROGRESS NOTE ADULT - SUBJECTIVE AND OBJECTIVE BOX
Follow-up Pulm Progress Note  Manuelito Mendez MD  [phone]    Stable cardio-resp status  Afebrile and hemodynamically stable    Vital Signs Last 24 Hrs  T(C): 37.1 (03 Jan 2020 11:57), Max: 37.1 (03 Jan 2020 11:57)  T(F): 98.7 (03 Jan 2020 11:57), Max: 98.7 (03 Jan 2020 11:57)  HR: 77 (03 Jan 2020 11:57) (76 - 90)  BP: 96/51 (03 Jan 2020 11:57) (96/51 - 134/55)  BP(mean): --  RR: 17 (03 Jan 2020 11:57) (16 - 20)  SpO2: 99% (03 Jan 2020 11:57) (93% - 99%)                         8.6    8.17  )-----------( 338      ( 02 Jan 2020 20:34 )             29.8       01-02    128<L>  |  87<L>  |  53<H>  ----------------------------<  101<H>  5.0   |  24  |  10.52<H>    Ca    9.9      02 Jan 2020 20:34    Physical Examination:  PULM: No wheeze or rhonchi  CVS: Regular rate and rhythm, no murmurs, rubs, or gallops  ABD: Soft, non-tender  EXT:  No clubbing, cyanosis, or edema    RADIOLOGY REVIEWED  CXR:    CT chest:    TTE:

## 2020-01-03 NOTE — PROGRESS NOTE ADULT - SUBJECTIVE AND OBJECTIVE BOX
PRESTON JOHNSON  39y Male  MRN:[id number]    Patient is a 39y old  Male who presents with a chief complaint of presyncope (24 Dec 2019 11:58)    HPI:  39 y/o M pt with multiple co morbid conditions including ESRD on hemodialysis 3 x weekly, COPD on home oxygen at baseline, obesity, HTN, DM2, migraine HA, schizophrenia and bipolar disorder, came for eval of AVF due to decreased thrill, then during ED stay RN who found patient slumped over in bed requiring sternal rub.  at time of evaluation. vitals stable. admitted for near syncope work up  Patient receives HD at [facility] via [facility] MWF, missed Sunday holiday scheduled HD (23 Dec 2019 13:24)      Patient seen and evaluated at bedside. No acute events overnight except as noted.    Interval HPI: no events o/n    PAST MEDICAL & SURGICAL HISTORY:  COPD (chronic obstructive pulmonary disease)  Migraine  HTN (hypertension)  DM (diabetes mellitus)  Bipolar disorder  Schizophrenia  ESRD on dialysis  Morbid obesity with BMI of 40.0-44.9, adult  H/O hernia repair      REVIEW OF SYSTEMS:  as per hpi    VITALS:  Vital Signs Last 24 Hrs  T(C): 37.1 (03 Jan 2020 11:57), Max: 37.1 (03 Jan 2020 11:57)  T(F): 98.7 (03 Jan 2020 11:57), Max: 98.7 (03 Jan 2020 11:57)  HR: 77 (03 Jan 2020 11:57) (76 - 88)  BP: 96/51 (03 Jan 2020 11:57) (96/51 - 134/55)  BP(mean): --  RR: 17 (03 Jan 2020 11:57) (16 - 20)  SpO2: 99% (03 Jan 2020 11:57) (93% - 99%)      PHYSICAL EXAM:  GENERAL: NAD, well-developed, obese, lethargic but arousable   HEAD:  Atraumatic, Normocephalic  EYES: EOMI, PERRLA, conjunctiva and sclera clear  NECK: Supple, No JVD  CHEST/LUNG: Clear to auscultation bilaterally; No wheeze  HEART: S1, S2; No murmurs, rubs, or gallops  ABDOMEN: Soft, Nontender, Nondistended; Bowel sounds present  EXTREMITIES:  2+ Peripheral Pulses, No clubbing, cyanosis,+  edema  PSYCH: Normal affect     Consultant(s) Notes Reviewed:  [x ] YES  [ ] NO  Care Discussed with Consultants/Other Providers [ x] YES  [ ] NO    MEDS:  MEDICATIONS  (STANDING):  ALBUTerol    90 MICROgram(s) HFA Inhaler 2 Puff(s) Inhalation every 6 hours  ascorbic acid 500 milliGRAM(s) Oral daily  carvedilol 6.25 milliGRAM(s) Oral every 12 hours  chlorhexidine 4% Liquid 1 Application(s) Topical once  clonazePAM  Tablet 0.5 milliGRAM(s) Oral every 12 hours  clopidogrel Tablet 75 milliGRAM(s) Oral daily  dextrose 5%. 1000 milliLiter(s) (50 mL/Hr) IV Continuous <Continuous>  dextrose 50% Injectable 12.5 Gram(s) IV Push once  dextrose 50% Injectable 25 Gram(s) IV Push once  dextrose 50% Injectable 25 Gram(s) IV Push once  epoetin cyndie Injectable 84738 Unit(s) IV Push <User Schedule>  ferrous    sulfate 325 milliGRAM(s) Oral daily  folic acid 1 milliGRAM(s) Oral daily  furosemide    Tablet 80 milliGRAM(s) Oral daily  gabapentin 300 milliGRAM(s) Oral at bedtime  heparin  Injectable 5000 Unit(s) SubCutaneous every 8 hours  insulin lispro (HumaLOG) corrective regimen sliding scale   SubCutaneous three times a day before meals  isosorbide   mononitrate ER Tablet (IMDUR) 30 milliGRAM(s) Oral daily  mirtazapine 7.5 milliGRAM(s) Oral at bedtime  pantoprazole    Tablet 40 milliGRAM(s) Oral before breakfast  PARoxetine 20 milliGRAM(s) Oral daily  polyethylene glycol 3350 17 Gram(s) Oral daily  risperiDONE   Tablet 1 milliGRAM(s) Oral every 12 hours  senna 2 Tablet(s) Oral at bedtime  sevelamer carbonate 2400 milliGRAM(s) Oral three times a day with meals  simvastatin 40 milliGRAM(s) Oral at bedtime    MEDICATIONS  (PRN):  dextrose 40% Gel 15 Gram(s) Oral once PRN Blood Glucose LESS THAN 70 milliGRAM(s)/deciliter  glucagon  Injectable 1 milliGRAM(s) IntraMuscular once PRN Glucose LESS THAN 70 milligrams/deciliter  methocarbamol 500 milliGRAM(s) Oral every 12 hours PRN spasms      ALLERGIES:  aspirin (Swelling)  fish (Unknown)  penicillins (Swelling)  shellfish (Unknown)      LABS:                                                    8.6    8.17  )-----------( 338      ( 02 Jan 2020 20:34 )             29.8   01-02    128<L>  |  87<L>  |  53<H>  ----------------------------<  101<H>  5.0   |  24  |  10.52<H>    Ca    9.9      02 Jan 2020 20:34          < from: [facility] Duplex Hemodialysis Access, Left (12.31.19 @ 14:54) >  mpression:     There is a dialysis access fistula created between the variant left radial artery and the egress cephalic vein in the distal upper arm.  There is a degree of stenosis at the surgical anastomosis and a second stenosis of the egress vein just beyond thesurgical anastomosis.  2 patent stents indicate prior revisions of this graft.   Increased flow through the ulnar artery and retrograde flow through the radial artery distal to the surgical anastomosis indicate steal phenomenon.      < end of copied text >             < from: CT Head No Cont (12.23.19 @ 18:03) >    IMPRESSION: Unremarkable noncontrast CT of the brain. No change from 2/21/2019.        < end of copied text >

## 2020-01-04 LAB
ANION GAP SERPL CALC-SCNC: 12 MMOL/L — SIGNIFICANT CHANGE UP (ref 5–17)
BUN SERPL-MCNC: 39 MG/DL — HIGH (ref 7–23)
CALCIUM SERPL-MCNC: 10.5 MG/DL — SIGNIFICANT CHANGE UP (ref 8.4–10.5)
CHLORIDE SERPL-SCNC: 89 MMOL/L — LOW (ref 96–108)
CO2 SERPL-SCNC: 26 MMOL/L — SIGNIFICANT CHANGE UP (ref 22–31)
CREAT SERPL-MCNC: 7.66 MG/DL — HIGH (ref 0.5–1.3)
GLUCOSE BLDC GLUCOMTR-MCNC: 102 MG/DL — HIGH (ref 70–99)
GLUCOSE BLDC GLUCOMTR-MCNC: 112 MG/DL — HIGH (ref 70–99)
GLUCOSE BLDC GLUCOMTR-MCNC: 113 MG/DL — HIGH (ref 70–99)
GLUCOSE BLDC GLUCOMTR-MCNC: 156 MG/DL — HIGH (ref 70–99)
GLUCOSE SERPL-MCNC: 118 MG/DL — HIGH (ref 70–99)
HCT VFR BLD CALC: 29.7 % — LOW (ref 39–50)
HGB BLD-MCNC: 8.7 G/DL — LOW (ref 13–17)
MCHC RBC-ENTMCNC: 28.2 PG — SIGNIFICANT CHANGE UP (ref 27–34)
MCHC RBC-ENTMCNC: 29.3 GM/DL — LOW (ref 32–36)
MCV RBC AUTO: 96.4 FL — SIGNIFICANT CHANGE UP (ref 80–100)
PLATELET # BLD AUTO: 344 K/UL — SIGNIFICANT CHANGE UP (ref 150–400)
POTASSIUM SERPL-MCNC: 4.6 MMOL/L — SIGNIFICANT CHANGE UP (ref 3.5–5.3)
POTASSIUM SERPL-SCNC: 4.6 MMOL/L — SIGNIFICANT CHANGE UP (ref 3.5–5.3)
RBC # BLD: 3.08 M/UL — LOW (ref 4.2–5.8)
RBC # FLD: 18.1 % — HIGH (ref 10.3–14.5)
SODIUM SERPL-SCNC: 127 MMOL/L — LOW (ref 135–145)
WBC # BLD: 8.21 K/UL — SIGNIFICANT CHANGE UP (ref 3.8–10.5)
WBC # FLD AUTO: 8.21 K/UL — SIGNIFICANT CHANGE UP (ref 3.8–10.5)

## 2020-01-04 RX ORDER — HYDROMORPHONE HYDROCHLORIDE 2 MG/ML
0.5 INJECTION INTRAMUSCULAR; INTRAVENOUS; SUBCUTANEOUS ONCE
Refills: 0 | Status: DISCONTINUED | OUTPATIENT
Start: 2020-01-04 | End: 2020-01-04

## 2020-01-04 RX ORDER — HYDROMORPHONE HYDROCHLORIDE 2 MG/ML
0.2 INJECTION INTRAMUSCULAR; INTRAVENOUS; SUBCUTANEOUS ONCE
Refills: 0 | Status: DISCONTINUED | OUTPATIENT
Start: 2020-01-04 | End: 2020-01-04

## 2020-01-04 RX ADMIN — HEPARIN SODIUM 5000 UNIT(S): 5000 INJECTION INTRAVENOUS; SUBCUTANEOUS at 01:40

## 2020-01-04 RX ADMIN — CARVEDILOL PHOSPHATE 6.25 MILLIGRAM(S): 80 CAPSULE, EXTENDED RELEASE ORAL at 05:56

## 2020-01-04 RX ADMIN — SENNA PLUS 2 TABLET(S): 8.6 TABLET ORAL at 22:09

## 2020-01-04 RX ADMIN — Medication 0.5 MILLIGRAM(S): at 05:56

## 2020-01-04 RX ADMIN — CLOPIDOGREL BISULFATE 75 MILLIGRAM(S): 75 TABLET, FILM COATED ORAL at 12:15

## 2020-01-04 RX ADMIN — CARVEDILOL PHOSPHATE 6.25 MILLIGRAM(S): 80 CAPSULE, EXTENDED RELEASE ORAL at 18:28

## 2020-01-04 RX ADMIN — HEPARIN SODIUM 5000 UNIT(S): 5000 INJECTION INTRAVENOUS; SUBCUTANEOUS at 18:27

## 2020-01-04 RX ADMIN — HYDROMORPHONE HYDROCHLORIDE 0.5 MILLIGRAM(S): 2 INJECTION INTRAMUSCULAR; INTRAVENOUS; SUBCUTANEOUS at 01:00

## 2020-01-04 RX ADMIN — HYDROMORPHONE HYDROCHLORIDE 0.5 MILLIGRAM(S): 2 INJECTION INTRAMUSCULAR; INTRAVENOUS; SUBCUTANEOUS at 00:23

## 2020-01-04 RX ADMIN — ALBUTEROL 2 PUFF(S): 90 AEROSOL, METERED ORAL at 05:57

## 2020-01-04 RX ADMIN — HYDROMORPHONE HYDROCHLORIDE 0.5 MILLIGRAM(S): 2 INJECTION INTRAMUSCULAR; INTRAVENOUS; SUBCUTANEOUS at 07:36

## 2020-01-04 RX ADMIN — Medication 325 MILLIGRAM(S): at 12:16

## 2020-01-04 RX ADMIN — HYDROMORPHONE HYDROCHLORIDE 0.2 MILLIGRAM(S): 2 INJECTION INTRAMUSCULAR; INTRAVENOUS; SUBCUTANEOUS at 19:56

## 2020-01-04 RX ADMIN — SIMVASTATIN 40 MILLIGRAM(S): 20 TABLET, FILM COATED ORAL at 22:09

## 2020-01-04 RX ADMIN — SEVELAMER CARBONATE 2400 MILLIGRAM(S): 2400 POWDER, FOR SUSPENSION ORAL at 18:28

## 2020-01-04 RX ADMIN — POLYETHYLENE GLYCOL 3350 17 GRAM(S): 17 POWDER, FOR SOLUTION ORAL at 12:16

## 2020-01-04 RX ADMIN — PANTOPRAZOLE SODIUM 40 MILLIGRAM(S): 20 TABLET, DELAYED RELEASE ORAL at 05:57

## 2020-01-04 RX ADMIN — SEVELAMER CARBONATE 2400 MILLIGRAM(S): 2400 POWDER, FOR SUSPENSION ORAL at 09:45

## 2020-01-04 RX ADMIN — GABAPENTIN 300 MILLIGRAM(S): 400 CAPSULE ORAL at 22:09

## 2020-01-04 RX ADMIN — Medication 1 MILLIGRAM(S): at 12:16

## 2020-01-04 RX ADMIN — ALBUTEROL 2 PUFF(S): 90 AEROSOL, METERED ORAL at 01:00

## 2020-01-04 RX ADMIN — Medication 500 MILLIGRAM(S): at 12:16

## 2020-01-04 RX ADMIN — SEVELAMER CARBONATE 2400 MILLIGRAM(S): 2400 POWDER, FOR SUSPENSION ORAL at 12:15

## 2020-01-04 RX ADMIN — HEPARIN SODIUM 5000 UNIT(S): 5000 INJECTION INTRAVENOUS; SUBCUTANEOUS at 09:45

## 2020-01-04 RX ADMIN — RISPERIDONE 1 MILLIGRAM(S): 4 TABLET ORAL at 05:56

## 2020-01-04 RX ADMIN — HYDROMORPHONE HYDROCHLORIDE 0.2 MILLIGRAM(S): 2 INJECTION INTRAMUSCULAR; INTRAVENOUS; SUBCUTANEOUS at 22:07

## 2020-01-04 RX ADMIN — Medication 0.5 MILLIGRAM(S): at 18:32

## 2020-01-04 RX ADMIN — ALBUTEROL 2 PUFF(S): 90 AEROSOL, METERED ORAL at 22:13

## 2020-01-04 RX ADMIN — RISPERIDONE 1 MILLIGRAM(S): 4 TABLET ORAL at 18:29

## 2020-01-04 RX ADMIN — Medication 80 MILLIGRAM(S): at 05:56

## 2020-01-04 RX ADMIN — MIRTAZAPINE 7.5 MILLIGRAM(S): 45 TABLET, ORALLY DISINTEGRATING ORAL at 22:09

## 2020-01-04 RX ADMIN — Medication 20 MILLIGRAM(S): at 12:15

## 2020-01-04 RX ADMIN — ALBUTEROL 2 PUFF(S): 90 AEROSOL, METERED ORAL at 12:14

## 2020-01-04 RX ADMIN — ALBUTEROL 2 PUFF(S): 90 AEROSOL, METERED ORAL at 18:28

## 2020-01-04 RX ADMIN — HYDROMORPHONE HYDROCHLORIDE 0.5 MILLIGRAM(S): 2 INJECTION INTRAMUSCULAR; INTRAVENOUS; SUBCUTANEOUS at 06:49

## 2020-01-04 RX ADMIN — ISOSORBIDE MONONITRATE 30 MILLIGRAM(S): 60 TABLET, EXTENDED RELEASE ORAL at 12:16

## 2020-01-04 NOTE — PROGRESS NOTE ADULT - SUBJECTIVE AND OBJECTIVE BOX
Subjective: Patient seen and examined. No new events except as noted.     REVIEW OF SYSTEMS:    CONSTITUTIONAL: + weakness, fevers or chills  EYES/ENT: No visual changes;  No vertigo or throat pain   NECK: No pain or stiffness  RESPIRATORY: No cough, wheezing, hemoptysis; No shortness of breath  CARDIOVASCULAR: No chest pain or palpitations  GASTROINTESTINAL: No abdominal or epigastric pain. No nausea, vomiting, or hematemesis; No diarrhea or constipation. No melena or hematochezia.  GENITOURINARY: No dysuria, frequency or hematuria  NEUROLOGICAL: No numbness or weakness  SKIN: No itching, burning, rashes, or lesions   All other review of systems is negative unless indicated above.    MEDICATIONS:  MEDICATIONS  (STANDING):  ALBUTerol    90 MICROgram(s) HFA Inhaler 2 Puff(s) Inhalation every 6 hours  ascorbic acid 500 milliGRAM(s) Oral daily  carvedilol 6.25 milliGRAM(s) Oral every 12 hours  chlorhexidine 4% Liquid 1 Application(s) Topical once  clonazePAM  Tablet 0.5 milliGRAM(s) Oral every 12 hours  clopidogrel Tablet 75 milliGRAM(s) Oral daily  dextrose 5%. 1000 milliLiter(s) (50 mL/Hr) IV Continuous <Continuous>  dextrose 50% Injectable 12.5 Gram(s) IV Push once  dextrose 50% Injectable 25 Gram(s) IV Push once  dextrose 50% Injectable 25 Gram(s) IV Push once  epoetin cyndie Injectable 61008 Unit(s) IV Push <User Schedule>  ferrous    sulfate 325 milliGRAM(s) Oral daily  folic acid 1 milliGRAM(s) Oral daily  furosemide    Tablet 80 milliGRAM(s) Oral daily  gabapentin 300 milliGRAM(s) Oral at bedtime  heparin  Injectable 5000 Unit(s) SubCutaneous every 8 hours  insulin lispro (HumaLOG) corrective regimen sliding scale   SubCutaneous three times a day before meals  isosorbide   mononitrate ER Tablet (IMDUR) 30 milliGRAM(s) Oral daily  mirtazapine 7.5 milliGRAM(s) Oral at bedtime  pantoprazole    Tablet 40 milliGRAM(s) Oral before breakfast  PARoxetine 20 milliGRAM(s) Oral daily  polyethylene glycol 3350 17 Gram(s) Oral daily  risperiDONE   Tablet 1 milliGRAM(s) Oral every 12 hours  senna 2 Tablet(s) Oral at bedtime  sevelamer carbonate 2400 milliGRAM(s) Oral three times a day with meals  simvastatin 40 milliGRAM(s) Oral at bedtime      PHYSICAL EXAM:  T(C): 36.6 (01-04-20 @ 11:35), Max: 37.7 (01-04-20 @ 04:14)  HR: 67 (01-04-20 @ 11:35) (67 - 88)  BP: 96/56 (01-04-20 @ 11:35) (96/56 - 157/65)  RR: 18 (01-04-20 @ 11:35) (18 - 19)  SpO2: 99% (01-04-20 @ 11:35) (91% - 99%)  Wt(kg): --  I&O's Summary    03 Jan 2020 07:01  -  04 Jan 2020 07:00  --------------------------------------------------------  IN: 480 mL / OUT: 6000 mL / NET: -5520 mL    04 Jan 2020 07:01  -  04 Jan 2020 20:18  --------------------------------------------------------  IN: 720 mL / OUT: 0 mL / NET: 720 mL          Appearance: Morbidly obese   HEENT:   Normal oral mucosa, PERRL, EOMI	  Lymphatic: No lymphadenopathy , no edema  Cardiovascular: Normal S1 S2, No JVD, No murmurs , Peripheral pulses palpable 2+ bilaterally  Respiratory: Lungs clear to auscultation, normal effort 	  Gastrointestinal:  Soft, Non-tender, + BS	  Skin: No rashes, No ecchymoses, No cyanosis, warm to touch  Musculoskeletal: Normal range of motion, normal strength  Psychiatry:  Mood & affect appropriate  Ext: No edema      LABS:    CARDIAC MARKERS:                                8.7    8.21  )-----------( 344      ( 04 Jan 2020 09:49 )             29.7     01-04    127<L>  |  89<L>  |  39<H>  ----------------------------<  118<H>  4.6   |  26  |  7.66<H>    Ca    10.5      04 Jan 2020 06:20      proBNP:   Lipid Profile:   HgA1c:   TSH:             TELEMETRY: 	    ECG:  	  RADIOLOGY:   DIAGNOSTIC TESTING:  [ ] Echocardiogram:  [ ]  Catheterization:  [ ] Stress Test:    OTHER:

## 2020-01-04 NOTE — PROGRESS NOTE ADULT - SUBJECTIVE AND OBJECTIVE BOX
Nephrology Followup Note - [phone] - Dr Villalobos / Dr Sanchez / Dr Barber / Dr Mehta / Dr Mauricio / Dr Alexander / Dr Hui / Dr La  Pt seen and examined at bedside  No acute events overnight. No complaints.     Allergies:  aspirin (Swelling)  fish (Unknown)  penicillins (Swelling)  shellfish (Unknown)    Hospital Medications:   MEDICATIONS  (STANDING):  ALBUTerol    90 MICROgram(s) HFA Inhaler 2 Puff(s) Inhalation every 6 hours  ascorbic acid 500 milliGRAM(s) Oral daily  carvedilol 6.25 milliGRAM(s) Oral every 12 hours  chlorhexidine 4% Liquid 1 Application(s) Topical once  clonazePAM  Tablet 0.5 milliGRAM(s) Oral every 12 hours  clopidogrel Tablet 75 milliGRAM(s) Oral daily  dextrose 5%. 1000 milliLiter(s) (50 mL/Hr) IV Continuous <Continuous>  dextrose 50% Injectable 12.5 Gram(s) IV Push once  dextrose 50% Injectable 25 Gram(s) IV Push once  dextrose 50% Injectable 25 Gram(s) IV Push once  epoetin cyndie Injectable 80005 Unit(s) IV Push <User Schedule>  ferrous    sulfate 325 milliGRAM(s) Oral daily  folic acid 1 milliGRAM(s) Oral daily  furosemide    Tablet 80 milliGRAM(s) Oral daily  gabapentin 300 milliGRAM(s) Oral at bedtime  heparin  Injectable 5000 Unit(s) SubCutaneous every 8 hours  insulin lispro (HumaLOG) corrective regimen sliding scale   SubCutaneous three times a day before meals  isosorbide   mononitrate ER Tablet (IMDUR) 30 milliGRAM(s) Oral daily  mirtazapine 7.5 milliGRAM(s) Oral at bedtime  pantoprazole    Tablet 40 milliGRAM(s) Oral before breakfast  PARoxetine 20 milliGRAM(s) Oral daily  polyethylene glycol 3350 17 Gram(s) Oral daily  risperiDONE   Tablet 1 milliGRAM(s) Oral every 12 hours  senna 2 Tablet(s) Oral at bedtime  sevelamer carbonate 2400 milliGRAM(s) Oral three times a day with meals  simvastatin 40 milliGRAM(s) Oral at bedtime    VITALS:  T(F): 99.8 (01-04-20 @ 04:14), Max: 99.8 (01-04-20 @ 04:14)  HR: 88 (01-04-20 @ 04:14)  BP: 123/61 (01-04-20 @ 04:14)  RR: 19 (01-04-20 @ 04:14)  SpO2: 99% (01-04-20 @ 04:14)  Wt(kg): --    01-03 @ 07:01  -  01-04 @ 07:00  --------------------------------------------------------  IN: 480 mL / OUT: 6000 mL / NET: -5520 mL        PHYSICAL EXAM:  Constitutional: NAD  HEENT: anicteric sclera, oropharynx clear, MMM  Neck: No JVD  Respiratory: CTAB, no wheezes, rales or rhonchi  Cardiovascular: S1, S2, RRR  Gastrointestinal: BS+, soft, NT/ND  Extremities: No cyanosis or clubbing. No peripheral edema  Neurological: A/O x 3, no focal deficits  Psychiatric: Normal mood, normal affect  : No CVA tenderness. No hernandez.   Skin: No rashes  Vascular Access: LUE AV access +thrill and bruit.     LABS:  01-04    127<L>  |  89<L>  |  39<H>  ----------------------------<  118<H>  4.6   |  26  |  7.66<H>    Ca    10.5      04 Jan 2020 06:20      Creatinine Trend: 7.66 <--, 11.31 <--, 10.52 <--, 9.02 <--, 6.08 <--                        8.4    8.09  )-----------( 329      ( 03 Jan 2020 16:50 )             28.5     Urine Studies:      RADIOLOGY & ADDITIONAL STUDIES:

## 2020-01-04 NOTE — PROGRESS NOTE ADULT - PROBLEM SELECTOR PLAN 1
Tolerating HD well yesterday  Dr. Qamar seo f/u as outpt.   Electrolytes acceptable.   stable for DC

## 2020-01-04 NOTE — PROGRESS NOTE ADULT - SUBJECTIVE AND OBJECTIVE BOX
PRESTON JOHNSON  39y Male  MRN:[id number]    Patient is a 39y old  Male who presents with a chief complaint of presyncope (24 Dec 2019 11:58)    HPI:  41 y/o M pt with multiple co morbid conditions including ESRD on hemodialysis 3 x weekly, COPD on home oxygen at baseline, obesity, HTN, DM2, migraine HA, schizophrenia and bipolar disorder, came for eval of AVF due to decreased thrill, then during ED stay RN who found patient slumped over in bed requiring sternal rub.  at time of evaluation. vitals stable. admitted for near syncope work up  Patient receives HD at [facility] via E AVF MWF, missed Sunday holiday scheduled HD (23 Dec 2019 13:24)      Patient seen and evaluated at bedside. No acute events overnight except as noted.    Interval HPI: no events o/n    PAST MEDICAL & SURGICAL HISTORY:  COPD (chronic obstructive pulmonary disease)  Migraine  HTN (hypertension)  DM (diabetes mellitus)  Bipolar disorder  Schizophrenia  ESRD on dialysis  Morbid obesity with BMI of 40.0-44.9, adult  H/O hernia repair      REVIEW OF SYSTEMS:  as per hpi    VITALS:  Vital Signs Last 24 Hrs  T(C): 36.2 (04 Jan 2020 20:39), Max: 37.7 (04 Jan 2020 04:14)  T(F): 97.2 (04 Jan 2020 20:39), Max: 99.8 (04 Jan 2020 04:14)  HR: 72 (04 Jan 2020 20:39) (67 - 88)  BP: 123/57 (04 Jan 2020 20:39) (96/56 - 123/61)  BP(mean): --  RR: 17 (04 Jan 2020 20:39) (17 - 19)  SpO2: 96% (04 Jan 2020 20:39) (96% - 99%)    PHYSICAL EXAM:  GENERAL: NAD, well-developed, obese, lethargic but arousable   HEAD:  Atraumatic, Normocephalic  EYES: EOMI, PERRLA, conjunctiva and sclera clear  NECK: Supple, No JVD  CHEST/LUNG: Clear to auscultation bilaterally; No wheeze  HEART: S1, S2; No murmurs, rubs, or gallops  ABDOMEN: Soft, Nontender, Nondistended; Bowel sounds present  EXTREMITIES:  2+ Peripheral Pulses, No clubbing, cyanosis,+  edema  PSYCH: Normal affect     Consultant(s) Notes Reviewed:  [x ] YES  [ ] NO  Care Discussed with Consultants/Other Providers [ x] YES  [ ] NO    MEDS:  MEDICATIONS  (STANDING):  ALBUTerol    90 MICROgram(s) HFA Inhaler 2 Puff(s) Inhalation every 6 hours  ascorbic acid 500 milliGRAM(s) Oral daily  carvedilol 6.25 milliGRAM(s) Oral every 12 hours  chlorhexidine 4% Liquid 1 Application(s) Topical once  clonazePAM  Tablet 0.5 milliGRAM(s) Oral every 12 hours  clopidogrel Tablet 75 milliGRAM(s) Oral daily  dextrose 5%. 1000 milliLiter(s) (50 mL/Hr) IV Continuous <Continuous>  dextrose 50% Injectable 12.5 Gram(s) IV Push once  dextrose 50% Injectable 25 Gram(s) IV Push once  dextrose 50% Injectable 25 Gram(s) IV Push once  epoetin cyndie Injectable 33347 Unit(s) IV Push <User Schedule>  ferrous    sulfate 325 milliGRAM(s) Oral daily  folic acid 1 milliGRAM(s) Oral daily  furosemide    Tablet 80 milliGRAM(s) Oral daily  gabapentin 300 milliGRAM(s) Oral at bedtime  heparin  Injectable 5000 Unit(s) SubCutaneous every 8 hours  insulin lispro (HumaLOG) corrective regimen sliding scale   SubCutaneous three times a day before meals  isosorbide   mononitrate ER Tablet (IMDUR) 30 milliGRAM(s) Oral daily  mirtazapine 7.5 milliGRAM(s) Oral at bedtime  pantoprazole    Tablet 40 milliGRAM(s) Oral before breakfast  PARoxetine 20 milliGRAM(s) Oral daily  polyethylene glycol 3350 17 Gram(s) Oral daily  risperiDONE   Tablet 1 milliGRAM(s) Oral every 12 hours  senna 2 Tablet(s) Oral at bedtime  sevelamer carbonate 2400 milliGRAM(s) Oral three times a day with meals  simvastatin 40 milliGRAM(s) Oral at bedtime    MEDICATIONS  (PRN):  dextrose 40% Gel 15 Gram(s) Oral once PRN Blood Glucose LESS THAN 70 milliGRAM(s)/deciliter  glucagon  Injectable 1 milliGRAM(s) IntraMuscular once PRN Glucose LESS THAN 70 milligrams/deciliter  methocarbamol 500 milliGRAM(s) Oral every 12 hours PRN spasms      ALLERGIES:  aspirin (Swelling)  fish (Unknown)  penicillins (Swelling)  shellfish (Unknown)      LABS:                                           8.7    8.21  )-----------( 344      ( 04 Jan 2020 09:49 )             29.7   01-04    127<L>  |  89<L>  |  39<H>  ----------------------------<  118<H>  4.6   |  26  |  7.66<H>    Ca    10.5      04 Jan 2020 06:20            < from: [facility] Duplex Hemodialysis Access, Left (12.31.19 @ 14:54) >  mpression:     There is a dialysis access fistula created between the variant left radial artery and the egress cephalic vein in the distal upper arm.  There is a degree of stenosis at the surgical anastomosis and a second stenosis of the egress vein just beyond thesurgical anastomosis.  2 patent stents indicate prior revisions of this graft.   Increased flow through the ulnar artery and retrograde flow through the radial artery distal to the surgical anastomosis indicate steal phenomenon.      < end of copied text >             < from: CT Head No Cont (12.23.19 @ 18:03) >    IMPRESSION: Unremarkable noncontrast CT of the brain. No change from 2/21/2019.        < end of copied text >

## 2020-01-04 NOTE — PROGRESS NOTE ADULT - ASSESSMENT
41 yo male extensive pmhx as noted above including esrd on hd, copd on o2, obesity, htn, dm, migraine, schizo, bipolar, a/w presyncope and fluid overload    fluid overload  renal consulted  plan for HD as per renal      occasional problems with access   vasc eval  noted   dopplers noted  as per d/w vasc, ir, and renal, to f/u as outpt for possible fistulogram  access working ok  during HD session    presyncope   CT head noted   check echo - ok to do outpt  cards consulted    DM  insulin as per endo    copd/alice  on home o2  pulm following  cpap at night. pt refuses/ non compliant     cont all prior home meds    dvt ppx    PT     dc  to rehab          Advanced care planning was discussed with patient and family.  Advanced care planning forms were reviewed and discussed.  Differential diagnosis and plan of care discussed with patient after the evaluation.   Pain assessed and judicious use of narcotics when appropriate was discussed.  Importance of Fall prevention discussed.  Counseling on Smoking and Alcohol cessation was offered when appropriate.  Counseling on Diet, exercise, and medication compliance was done.

## 2020-01-04 NOTE — PROGRESS NOTE ADULT - ASSESSMENT
39 y/o M pt with multiple co morbid conditions including ESRD on hemodialysis 3 x weekly, COPD on home oxygen at baseline, obesity, HTN, DM2, migraine HA, schizophrenia and bipolar disorder, came for eval of AVF due to decreased thrill, then during ED stay RN who found patient slumped over in bed requiring sternal rub.  at time of evaluation. vitals stable. admitted for near syncope work up  Patient receives HD at [facility] via [facility]F MWF, missed Chidi holiday scheduled HD

## 2020-01-04 NOTE — PROGRESS NOTE ADULT - ATTENDING COMMENTS
Dr Kyler Villalobos  Nephrology  Office [phone]  Ans Serv [phone]  [facility] [phone]

## 2020-01-05 LAB
ANION GAP SERPL CALC-SCNC: 28 MMOL/L — HIGH (ref 5–17)
BUN SERPL-MCNC: 52 MG/DL — HIGH (ref 7–23)
CALCIUM SERPL-MCNC: <3 MG/DL — CRITICAL LOW (ref 8.4–10.5)
CHLORIDE SERPL-SCNC: 84 MMOL/L — LOW (ref 96–108)
CO2 SERPL-SCNC: 14 MMOL/L — LOW (ref 22–31)
CREAT SERPL-MCNC: 9.26 MG/DL — HIGH (ref 0.5–1.3)
GAS PNL BLDA: SIGNIFICANT CHANGE UP
GLUCOSE BLDC GLUCOMTR-MCNC: 111 MG/DL — HIGH (ref 70–99)
GLUCOSE BLDC GLUCOMTR-MCNC: 114 MG/DL — HIGH (ref 70–99)
GLUCOSE BLDC GLUCOMTR-MCNC: 116 MG/DL — HIGH (ref 70–99)
GLUCOSE BLDC GLUCOMTR-MCNC: 117 MG/DL — HIGH (ref 70–99)
GLUCOSE BLDC GLUCOMTR-MCNC: 132 MG/DL — HIGH (ref 70–99)
GLUCOSE SERPL-MCNC: 127 MG/DL — HIGH (ref 70–99)
HCT VFR BLD CALC: 30.5 % — LOW (ref 39–50)
HGB BLD-MCNC: 8.8 G/DL — LOW (ref 13–17)
MCHC RBC-ENTMCNC: 27.9 PG — SIGNIFICANT CHANGE UP (ref 27–34)
MCHC RBC-ENTMCNC: 28.9 GM/DL — LOW (ref 32–36)
MCV RBC AUTO: 96.8 FL — SIGNIFICANT CHANGE UP (ref 80–100)
NRBC # BLD: 0 /100 WBCS — SIGNIFICANT CHANGE UP (ref 0–0)
PLATELET # BLD AUTO: 329 K/UL — SIGNIFICANT CHANGE UP (ref 150–400)
POTASSIUM SERPL-MCNC: >9 MMOL/L — CRITICAL HIGH (ref 3.5–5.3)
POTASSIUM SERPL-SCNC: >9 MMOL/L — CRITICAL HIGH (ref 3.5–5.3)
RBC # BLD: 3.15 M/UL — LOW (ref 4.2–5.8)
RBC # FLD: 17.8 % — HIGH (ref 10.3–14.5)
SODIUM SERPL-SCNC: 126 MMOL/L — LOW (ref 135–145)
WBC # BLD: 10.14 K/UL — SIGNIFICANT CHANGE UP (ref 3.8–10.5)
WBC # FLD AUTO: 10.14 K/UL — SIGNIFICANT CHANGE UP (ref 3.8–10.5)

## 2020-01-05 PROCEDURE — 93010 ELECTROCARDIOGRAM REPORT: CPT

## 2020-01-05 RX ORDER — ALBUTEROL 90 UG/1
10 AEROSOL, METERED ORAL ONCE
Refills: 0 | Status: DISCONTINUED | OUTPATIENT
Start: 2020-01-05 | End: 2020-01-05

## 2020-01-05 RX ORDER — DEXTROSE 50 % IN WATER 50 %
50 SYRINGE (ML) INTRAVENOUS ONCE
Refills: 0 | Status: COMPLETED | OUTPATIENT
Start: 2020-01-05 | End: 2020-01-05

## 2020-01-05 RX ORDER — INSULIN HUMAN 100 [IU]/ML
10 INJECTION, SOLUTION SUBCUTANEOUS ONCE
Refills: 0 | Status: COMPLETED | OUTPATIENT
Start: 2020-01-05 | End: 2020-01-05

## 2020-01-05 RX ORDER — HYDROMORPHONE HYDROCHLORIDE 2 MG/ML
0.2 INJECTION INTRAMUSCULAR; INTRAVENOUS; SUBCUTANEOUS ONCE
Refills: 0 | Status: DISCONTINUED | OUTPATIENT
Start: 2020-01-05 | End: 2020-01-05

## 2020-01-05 RX ORDER — CALCIUM CHLORIDE
500 POWDER (GRAM) MISCELLANEOUS ONCE
Refills: 0 | Status: DISCONTINUED | OUTPATIENT
Start: 2020-01-05 | End: 2020-01-05

## 2020-01-05 RX ORDER — OXYCODONE HYDROCHLORIDE 5 MG/1
5 TABLET ORAL ONCE
Refills: 0 | Status: DISCONTINUED | OUTPATIENT
Start: 2020-01-05 | End: 2020-01-05

## 2020-01-05 RX ORDER — HYDROMORPHONE HYDROCHLORIDE 2 MG/ML
0.2 INJECTION INTRAMUSCULAR; INTRAVENOUS; SUBCUTANEOUS EVERY 8 HOURS
Refills: 0 | Status: DISCONTINUED | OUTPATIENT
Start: 2020-01-05 | End: 2020-01-05

## 2020-01-05 RX ORDER — OXYCODONE HYDROCHLORIDE 5 MG/1
2.5 TABLET ORAL ONCE
Refills: 0 | Status: DISCONTINUED | OUTPATIENT
Start: 2020-01-05 | End: 2020-01-05

## 2020-01-05 RX ORDER — CALCIUM GLUCONATE 100 MG/ML
1 VIAL (ML) INTRAVENOUS ONCE
Refills: 0 | Status: DISCONTINUED | OUTPATIENT
Start: 2020-01-05 | End: 2020-01-05

## 2020-01-05 RX ADMIN — HYDROMORPHONE HYDROCHLORIDE 0.2 MILLIGRAM(S): 2 INJECTION INTRAMUSCULAR; INTRAVENOUS; SUBCUTANEOUS at 01:49

## 2020-01-05 RX ADMIN — OXYCODONE HYDROCHLORIDE 5 MILLIGRAM(S): 5 TABLET ORAL at 21:04

## 2020-01-05 RX ADMIN — HYDROMORPHONE HYDROCHLORIDE 0.2 MILLIGRAM(S): 2 INJECTION INTRAMUSCULAR; INTRAVENOUS; SUBCUTANEOUS at 10:20

## 2020-01-05 RX ADMIN — SENNA PLUS 2 TABLET(S): 8.6 TABLET ORAL at 23:18

## 2020-01-05 RX ADMIN — HYDROMORPHONE HYDROCHLORIDE 0.2 MILLIGRAM(S): 2 INJECTION INTRAMUSCULAR; INTRAVENOUS; SUBCUTANEOUS at 10:04

## 2020-01-05 RX ADMIN — SIMVASTATIN 40 MILLIGRAM(S): 20 TABLET, FILM COATED ORAL at 23:18

## 2020-01-05 RX ADMIN — HYDROMORPHONE HYDROCHLORIDE 0.2 MILLIGRAM(S): 2 INJECTION INTRAMUSCULAR; INTRAVENOUS; SUBCUTANEOUS at 02:13

## 2020-01-05 RX ADMIN — Medication 0.5 MILLIGRAM(S): at 18:52

## 2020-01-05 RX ADMIN — Medication 0.5 MILLIGRAM(S): at 05:40

## 2020-01-05 RX ADMIN — HEPARIN SODIUM 5000 UNIT(S): 5000 INJECTION INTRAVENOUS; SUBCUTANEOUS at 23:19

## 2020-01-05 RX ADMIN — ALBUTEROL 2 PUFF(S): 90 AEROSOL, METERED ORAL at 23:20

## 2020-01-05 RX ADMIN — CARVEDILOL PHOSPHATE 6.25 MILLIGRAM(S): 80 CAPSULE, EXTENDED RELEASE ORAL at 05:39

## 2020-01-05 RX ADMIN — CARVEDILOL PHOSPHATE 6.25 MILLIGRAM(S): 80 CAPSULE, EXTENDED RELEASE ORAL at 18:52

## 2020-01-05 RX ADMIN — HEPARIN SODIUM 5000 UNIT(S): 5000 INJECTION INTRAVENOUS; SUBCUTANEOUS at 18:52

## 2020-01-05 RX ADMIN — MIRTAZAPINE 7.5 MILLIGRAM(S): 45 TABLET, ORALLY DISINTEGRATING ORAL at 23:18

## 2020-01-05 RX ADMIN — Medication 80 MILLIGRAM(S): at 05:39

## 2020-01-05 RX ADMIN — RISPERIDONE 1 MILLIGRAM(S): 4 TABLET ORAL at 05:39

## 2020-01-05 RX ADMIN — GABAPENTIN 300 MILLIGRAM(S): 400 CAPSULE ORAL at 23:18

## 2020-01-05 RX ADMIN — SEVELAMER CARBONATE 2400 MILLIGRAM(S): 2400 POWDER, FOR SUSPENSION ORAL at 10:03

## 2020-01-05 RX ADMIN — RISPERIDONE 1 MILLIGRAM(S): 4 TABLET ORAL at 18:52

## 2020-01-05 RX ADMIN — OXYCODONE HYDROCHLORIDE 2.5 MILLIGRAM(S): 5 TABLET ORAL at 23:18

## 2020-01-05 RX ADMIN — ALBUTEROL 2 PUFF(S): 90 AEROSOL, METERED ORAL at 05:40

## 2020-01-05 RX ADMIN — PANTOPRAZOLE SODIUM 40 MILLIGRAM(S): 20 TABLET, DELAYED RELEASE ORAL at 05:41

## 2020-01-05 RX ADMIN — OXYCODONE HYDROCHLORIDE 5 MILLIGRAM(S): 5 TABLET ORAL at 20:00

## 2020-01-05 RX ADMIN — HEPARIN SODIUM 5000 UNIT(S): 5000 INJECTION INTRAVENOUS; SUBCUTANEOUS at 10:03

## 2020-01-05 RX ADMIN — Medication 20 MILLIGRAM(S): at 12:31

## 2020-01-05 NOTE — PROGRESS NOTE ADULT - SUBJECTIVE AND OBJECTIVE BOX
[facility] - Laura / Wilfredo CASTELLANO /Tyson/ NONA Mauricio/ NONA Barber/ Colt Hui / LATRELL Njeru  ---------------------------------------------------------------------------------------------------------------    Patient seen and examined bedside    Subjective and Objective: No overnight events. denied sob. No complaints today.  got dilaudid in AM, was mostly sleeping, didn't eat much per RN, no outside food    Allergies: aspirin (Swelling)  fish (Unknown)  penicillins (Swelling)  shellfish (Unknown)      Hospital Medications:   MEDICATIONS  (STANDING):  ALBUTerol    90 MICROgram(s) HFA Inhaler 2 Puff(s) Inhalation every 6 hours  ascorbic acid 500 milliGRAM(s) Oral daily  carvedilol 6.25 milliGRAM(s) Oral every 12 hours  chlorhexidine 4% Liquid 1 Application(s) Topical once  clonazePAM  Tablet 0.5 milliGRAM(s) Oral every 12 hours  clopidogrel Tablet 75 milliGRAM(s) Oral daily  dextrose 5%. 1000 milliLiter(s) (50 mL/Hr) IV Continuous <Continuous>  dextrose 50% Injectable 12.5 Gram(s) IV Push once  dextrose 50% Injectable 25 Gram(s) IV Push once  dextrose 50% Injectable 25 Gram(s) IV Push once  dextrose 50% Injectable 50 milliLiter(s) IV Push once  epoetin cyndie Injectable 22692 Unit(s) IV Push <User Schedule>  ferrous    sulfate 325 milliGRAM(s) Oral daily  folic acid 1 milliGRAM(s) Oral daily  furosemide    Tablet 80 milliGRAM(s) Oral daily  gabapentin 300 milliGRAM(s) Oral at bedtime  heparin  Injectable 5000 Unit(s) SubCutaneous every 8 hours  insulin lispro (HumaLOG) corrective regimen sliding scale   SubCutaneous three times a day before meals  insulin regular  human recombinant 10 Unit(s) IV Push once  isosorbide   mononitrate ER Tablet (IMDUR) 30 milliGRAM(s) Oral daily  mirtazapine 7.5 milliGRAM(s) Oral at bedtime  pantoprazole    Tablet 40 milliGRAM(s) Oral before breakfast  PARoxetine 20 milliGRAM(s) Oral daily  polyethylene glycol 3350 17 Gram(s) Oral daily  risperiDONE   Tablet 1 milliGRAM(s) Oral every 12 hours  senna 2 Tablet(s) Oral at bedtime  sevelamer carbonate 2400 milliGRAM(s) Oral three times a day with meals  simvastatin 40 milliGRAM(s) Oral at bedtime      VITALS:  T(F): 98.8 (01-05-20 @ 14:00), Max: 98.8 (01-05-20 @ 14:00)  HR: 72 (01-05-20 @ 14:00)  BP: 96/60 (01-05-20 @ 14:00)  RR: 18 (01-05-20 @ 14:00)  SpO2: 99% (01-05-20 @ 14:00)  Wt(kg): --    01-04 @ 07:01  -  01-05 @ 07:00  --------------------------------------------------------  IN: 720 mL / OUT: 0 mL / NET: 720 mL    01-05 @ 07:01  -  01-05 @ 16:49  --------------------------------------------------------  IN: 240 mL / OUT: 0 mL / NET: 240 mL        PHYSICAL EXAM:  Constitutional: NAD, on NC o2, M obese  HEENT: anicteric sclera, oropharynx clear  Neck: No JVD  Respiratory: dec bibasilar BS, no wheezes, rales or rhonchi  Cardiovascular: S1, S2, RRR  Gastrointestinal: BS+, soft, NT, obese  Extremities: No cyanosis or clubbing. 3+ peripheral edema b/l   Neurological: A/O x 3, no focal deficits  Psychiatric: Normal mood, normal affect  : No hernandez.   Vascular Access: [facility] AVF+thrill    LABS:  01-05    126<L>  |  84<L>  |  52<H>  ----------------------------<  127<H>  >9.0<HH>   |  14<L>  |  9.26<H>    Ca    <3.0<LL>      05 Jan 2020 11:14      Creatinine Trend: 9.26 <--, 7.66 <--, 11.31 <--, 10.52 <--, 9.02 <--                        8.8    10.14 )-----------( 329      ( 05 Jan 2020 11:14 )             30.5     Urine Studies:        RADIOLOGY & ADDITIONAL STUDIES:

## 2020-01-05 NOTE — PROGRESS NOTE ADULT - ASSESSMENT
39 y/o M pt with multiple co morbid conditions including ESRD on hemodialysis 3 x weekly, COPD on home oxygen at baseline, obesity, HTN, DM2, migraine HA, schizophrenia and bipolar disorder, came for eval of AVF due to decreased thrill, then during ED stay RN who found patient slumped over in bed requiring sternal rub.  at time of evaluation. vitals stable. admitted for near syncope work up  Patient [facility], missed Sunday holiday scheduled HD  Renal following for ESRD Mx.     labs, chart, rad reviewed

## 2020-01-05 NOTE — PROVIDER CONTACT NOTE (MEDICATION) - BACKGROUND
40 YOM p/w decreased thrill to AVF, pt also found slumped over in bed (in ED). Dx: Near syncope, ESRD HD/AVF

## 2020-01-05 NOTE — PROGRESS NOTE ADULT - SUBJECTIVE AND OBJECTIVE BOX
PRESTON JOHNSON  39y Male  MRN:[id number]    Patient is a 39y old  Male who presents with a chief complaint of presyncope (24 Dec 2019 11:58)    HPI:  41 y/o M pt with multiple co morbid conditions including ESRD on hemodialysis 3 x weekly, COPD on home oxygen at baseline, obesity, HTN, DM2, migraine HA, schizophrenia and bipolar disorder, came for eval of AVF due to decreased thrill, then during ED stay RN who found patient slumped over in bed requiring sternal rub.  at time of evaluation. vitals stable. admitted for near syncope work up  Patient receives HD at [facility] via E AVF MWF, missed Sunday holiday scheduled HD (23 Dec 2019 13:24)      Patient seen and evaluated at bedside. No acute events overnight except as noted.    Interval HPI: no events o/n    PAST MEDICAL & SURGICAL HISTORY:  COPD (chronic obstructive pulmonary disease)  Migraine  HTN (hypertension)  DM (diabetes mellitus)  Bipolar disorder  Schizophrenia  ESRD on dialysis  Morbid obesity with BMI of 40.0-44.9, adult  H/O hernia repair      REVIEW OF SYSTEMS:  as per hpi    VITALS:  Vital Signs Last 24 Hrs  T(C): 37.1 (05 Jan 2020 14:00), Max: 37.1 (05 Jan 2020 14:00)  T(F): 98.8 (05 Jan 2020 14:00), Max: 98.8 (05 Jan 2020 14:00)  HR: 72 (05 Jan 2020 14:00) (71 - 103)  BP: 96/60 (05 Jan 2020 14:00) (96/60 - 123/57)  BP(mean): --  RR: 18 (05 Jan 2020 14:00) (17 - 19)  SpO2: 99% (05 Jan 2020 14:00) (96% - 99%)    PHYSICAL EXAM:  GENERAL: NAD, well-developed, obese, lethargic but arousable   HEAD:  Atraumatic, Normocephalic  EYES: EOMI, PERRLA, conjunctiva and sclera clear  NECK: Supple, No JVD  CHEST/LUNG: Clear to auscultation bilaterally; No wheeze  HEART: S1, S2; No murmurs, rubs, or gallops  ABDOMEN: Soft, Nontender, Nondistended; Bowel sounds present  EXTREMITIES:  2+ Peripheral Pulses, No clubbing, cyanosis,+  edema  PSYCH: Normal affect     Consultant(s) Notes Reviewed:  [x ] YES  [ ] NO  Care Discussed with Consultants/Other Providers [ x] YES  [ ] NO    MEDS:  MEDICATIONS  (STANDING):  ALBUTerol    90 MICROgram(s) HFA Inhaler 2 Puff(s) Inhalation every 6 hours  ascorbic acid 500 milliGRAM(s) Oral daily  carvedilol 6.25 milliGRAM(s) Oral every 12 hours  chlorhexidine 4% Liquid 1 Application(s) Topical once  clonazePAM  Tablet 0.5 milliGRAM(s) Oral every 12 hours  clopidogrel Tablet 75 milliGRAM(s) Oral daily  dextrose 5%. 1000 milliLiter(s) (50 mL/Hr) IV Continuous <Continuous>  dextrose 50% Injectable 12.5 Gram(s) IV Push once  dextrose 50% Injectable 25 Gram(s) IV Push once  dextrose 50% Injectable 25 Gram(s) IV Push once  epoetin cyndie Injectable 97247 Unit(s) IV Push <User Schedule>  ferrous    sulfate 325 milliGRAM(s) Oral daily  folic acid 1 milliGRAM(s) Oral daily  furosemide    Tablet 80 milliGRAM(s) Oral daily  gabapentin 300 milliGRAM(s) Oral at bedtime  heparin  Injectable 5000 Unit(s) SubCutaneous every 8 hours  insulin lispro (HumaLOG) corrective regimen sliding scale   SubCutaneous three times a day before meals  isosorbide   mononitrate ER Tablet (IMDUR) 30 milliGRAM(s) Oral daily  mirtazapine 7.5 milliGRAM(s) Oral at bedtime  pantoprazole    Tablet 40 milliGRAM(s) Oral before breakfast  PARoxetine 20 milliGRAM(s) Oral daily  polyethylene glycol 3350 17 Gram(s) Oral daily  risperiDONE   Tablet 1 milliGRAM(s) Oral every 12 hours  senna 2 Tablet(s) Oral at bedtime  sevelamer carbonate 2400 milliGRAM(s) Oral three times a day with meals  simvastatin 40 milliGRAM(s) Oral at bedtime    MEDICATIONS  (PRN):  dextrose 40% Gel 15 Gram(s) Oral once PRN Blood Glucose LESS THAN 70 milliGRAM(s)/deciliter  glucagon  Injectable 1 milliGRAM(s) IntraMuscular once PRN Glucose LESS THAN 70 milligrams/deciliter  methocarbamol 500 milliGRAM(s) Oral every 12 hours PRN spasms      ALLERGIES:  aspirin (Swelling)  fish (Unknown)  penicillins (Swelling)  shellfish (Unknown)      LABS:                                              8.8    10.14 )-----------( 329      ( 05 Jan 2020 11:14 )             30.5   01-05    126<L>  |  84<L>  |  52<H>  ----------------------------<  127<H>  >9.0<HH>   |  14<L>  |  9.26<H>    Ca    <3.0<LL>      05 Jan 2020 11:14      ABG - ( 05 Jan 2020 16:00 )  pH, Arterial: 7.28  pH, Blood: x     /  pCO2: 61    /  pO2: 161   / HCO3: 28    / Base Excess: .7    /  SaO2: 99                      < from: VA Duplex Hemodialysis Access, Left (12.31.19 @ 14:54) >  mpression:     There is a dialysis access fistula created between the variant left radial artery and the egress cephalic vein in the distal upper arm.  There is a degree of stenosis at the surgical anastomosis and a second stenosis of the egress vein just beyond thesurgical anastomosis.  2 patent stents indicate prior revisions of this graft.   Increased flow through the ulnar artery and retrograde flow through the radial artery distal to the surgical anastomosis indicate steal phenomenon.      < end of copied text >             < from: CT Head No Cont (12.23.19 @ 18:03) >    IMPRESSION: Unremarkable noncontrast CT of the brain. No change from 2/21/2019.        < end of copied text >

## 2020-01-05 NOTE — PROGRESS NOTE ADULT - PROBLEM SELECTOR PLAN 1
s/p HD 1/3  hypervolemic, hyponatremia 2/2 dilutional  hyperkalemia- K was sent in wrong tube. EKG-no acute changes of high K  ABG k 5.2 now. d/w primary team bedside  c/w renal diet. fluid restriction 1.2L/day, d/w pt  HD tomorrow 1st shift w/2k bath, uf 4.5-5L as tolearted  Dr. Qamar seo f/u as outpt. s/p HD 1/3  hypervolemic, hyponatremia 2/2 dilutional  hyperkalemia- K was sent in wrong tube. EKG-no acute changes of high K  ABG done now- reviewed k 5.2, no need for medical Mx of hyperkalemia or urgent hd tonight. d/w primary team bedside  c/w renal diet. fluid restriction 1.2L/day, d/w pt  HD tomorrow 1st shift w/2k bath, uf 4.5-5L as tolearted  dose all meds for ESRD  anemia in CKD- c/w epogen 20k tiw w/hd. d/c ferrous sulfate  Dr. Foote [facility] f/u as outpt.

## 2020-01-05 NOTE — PROGRESS NOTE ADULT - SUBJECTIVE AND OBJECTIVE BOX
Subjective: Patient seen and examined. No new events except as noted.     REVIEW OF SYSTEMS:    CONSTITUTIONAL: + weakness, fevers or chills  EYES/ENT: No visual changes;  No vertigo or throat pain   NECK: No pain or stiffness  RESPIRATORY: No cough, wheezing, hemoptysis; No shortness of breath  CARDIOVASCULAR: No chest pain or palpitations  GASTROINTESTINAL: No abdominal or epigastric pain. No nausea, vomiting, or hematemesis; No diarrhea or constipation. No melena or hematochezia.  GENITOURINARY: No dysuria, frequency or hematuria  NEUROLOGICAL: No numbness or weakness  SKIN: No itching, burning, rashes, or lesions   All other review of systems is negative unless indicated above.    MEDICATIONS:  MEDICATIONS  (STANDING):  ALBUTerol    90 MICROgram(s) HFA Inhaler 2 Puff(s) Inhalation every 6 hours  ascorbic acid 500 milliGRAM(s) Oral daily  carvedilol 6.25 milliGRAM(s) Oral every 12 hours  chlorhexidine 4% Liquid 1 Application(s) Topical once  clonazePAM  Tablet 0.5 milliGRAM(s) Oral every 12 hours  clopidogrel Tablet 75 milliGRAM(s) Oral daily  dextrose 5%. 1000 milliLiter(s) (50 mL/Hr) IV Continuous <Continuous>  dextrose 50% Injectable 12.5 Gram(s) IV Push once  dextrose 50% Injectable 25 Gram(s) IV Push once  dextrose 50% Injectable 25 Gram(s) IV Push once  epoetin cyndie Injectable 93237 Unit(s) IV Push <User Schedule>  ferrous    sulfate 325 milliGRAM(s) Oral daily  folic acid 1 milliGRAM(s) Oral daily  furosemide    Tablet 80 milliGRAM(s) Oral daily  gabapentin 300 milliGRAM(s) Oral at bedtime  heparin  Injectable 5000 Unit(s) SubCutaneous every 8 hours  insulin lispro (HumaLOG) corrective regimen sliding scale   SubCutaneous three times a day before meals  isosorbide   mononitrate ER Tablet (IMDUR) 30 milliGRAM(s) Oral daily  mirtazapine 7.5 milliGRAM(s) Oral at bedtime  pantoprazole    Tablet 40 milliGRAM(s) Oral before breakfast  PARoxetine 20 milliGRAM(s) Oral daily  polyethylene glycol 3350 17 Gram(s) Oral daily  risperiDONE   Tablet 1 milliGRAM(s) Oral every 12 hours  senna 2 Tablet(s) Oral at bedtime  sevelamer carbonate 2400 milliGRAM(s) Oral three times a day with meals  simvastatin 40 milliGRAM(s) Oral at bedtime      PHYSICAL EXAM:  T(C): 36.4 (01-05-20 @ 11:38), Max: 36.8 (01-05-20 @ 05:36)  HR: 71 (01-05-20 @ 11:38) (71 - 103)  BP: 105/55 (01-05-20 @ 11:38) (105/55 - 123/57)  RR: 18 (01-05-20 @ 11:38) (17 - 19)  SpO2: 99% (01-05-20 @ 11:38) (96% - 99%)  Wt(kg): --  I&O's Summary    04 Jan 2020 07:01  -  05 Jan 2020 07:00  --------------------------------------------------------  IN: 720 mL / OUT: 0 mL / NET: 720 mL          Appearance: Morbidly obese   HEENT:   Normal oral mucosa, PERRL, EOMI	  Lymphatic: No lymphadenopathy , no edema  Cardiovascular: Normal S1 S2, No JVD, No murmurs , Peripheral pulses palpable 2+ bilaterally  Respiratory: Lungs clear to auscultation, normal effort 	  Gastrointestinal:  Soft, Non-tender, + BS	  Skin: No rashes, No ecchymoses, No cyanosis, warm to touch  Musculoskeletal: Normal range of motion, normal strength  Psychiatry:  Mood & affect appropriate  Ext: No edema      LABS:    CARDIAC MARKERS:                                8.8    10.14 )-----------( 329      ( 05 Jan 2020 11:14 )             30.5     01-05    126<L>  |  84<L>  |  52<H>  ----------------------------<  127<H>  >9.0<HH>   |  14<L>  |  9.26<H>    Ca    <3.0<LL>      05 Jan 2020 11:14      proBNP:   Lipid Profile:   HgA1c:   TSH:             TELEMETRY: 	    ECG:  	  RADIOLOGY:   DIAGNOSTIC TESTING:  [ ] Echocardiogram:  [ ]  Catheterization:  [ ] Stress Test:    OTHER:

## 2020-01-05 NOTE — PROGRESS NOTE ADULT - PROBLEM SELECTOR PLAN 2
Off most anti-HTN meds, tolerating HD and UF well  low BP, asympt  consider d/c coreg.   c/w lasix Off most anti-HTN meds, tolerating HD and UF well  low BP, asympt  consider d/c Imdur. c/w coreg, hold AM of hd  c/w lasix 80mg po qd

## 2020-01-05 NOTE — PROGRESS NOTE ADULT - ASSESSMENT
39 yo male extensive pmhx as noted above including esrd on hd, copd on o2, obesity, htn, dm, migraine, schizo, bipolar, a/w presyncope and fluid overload    fluid overload  renal consulted  plan for HD as per renal      occasional problems with access   vasc eval  noted   dopplers noted  as per d/w vasc, ir, and renal, to f/u as outpt for possible fistulogram  access working ok  during HD session    presyncope   CT head noted   check echo - ok to do outpt  cards consulted    DM  insulin as per endo    copd/alice  on home o2  pulm following  cpap at night. pt refuses/ non compliant     cont all prior home meds    hyperK  repeated on ABG  acceptable  for HD in am      dvt ppx    PT     dc  to rehab          Advanced care planning was discussed with patient and family.  Advanced care planning forms were reviewed and discussed.  Differential diagnosis and plan of care discussed with patient after the evaluation.   Pain assessed and judicious use of narcotics when appropriate was discussed.  Importance of Fall prevention discussed.  Counseling on Smoking and Alcohol cessation was offered when appropriate.  Counseling on Diet, exercise, and medication compliance was done.

## 2020-01-06 ENCOUNTER — TRANSCRIPTION ENCOUNTER (OUTPATIENT)
Age: 40
End: 2020-01-06

## 2020-01-06 VITALS
HEART RATE: 83 BPM | TEMPERATURE: 98 F | DIASTOLIC BLOOD PRESSURE: 74 MMHG | SYSTOLIC BLOOD PRESSURE: 155 MMHG | OXYGEN SATURATION: 98 % | RESPIRATION RATE: 18 BRPM

## 2020-01-06 LAB
ANION GAP SERPL CALC-SCNC: 16 MMOL/L — SIGNIFICANT CHANGE UP (ref 5–17)
BUN SERPL-MCNC: 62 MG/DL — HIGH (ref 7–23)
CALCIUM SERPL-MCNC: 10.2 MG/DL — SIGNIFICANT CHANGE UP (ref 8.4–10.5)
CHLORIDE SERPL-SCNC: 89 MMOL/L — LOW (ref 96–108)
CO2 SERPL-SCNC: 24 MMOL/L — SIGNIFICANT CHANGE UP (ref 22–31)
CREAT SERPL-MCNC: 10.67 MG/DL — HIGH (ref 0.5–1.3)
GLUCOSE BLDC GLUCOMTR-MCNC: 102 MG/DL — HIGH (ref 70–99)
GLUCOSE BLDC GLUCOMTR-MCNC: 123 MG/DL — HIGH (ref 70–99)
GLUCOSE BLDC GLUCOMTR-MCNC: 95 MG/DL — SIGNIFICANT CHANGE UP (ref 70–99)
GLUCOSE SERPL-MCNC: 97 MG/DL — SIGNIFICANT CHANGE UP (ref 70–99)
HBV SURFACE AB SER-ACNC: 107.5 MIU/ML — SIGNIFICANT CHANGE UP
HBV SURFACE AG SER-ACNC: SIGNIFICANT CHANGE UP
HCT VFR BLD CALC: 28.8 % — LOW (ref 39–50)
HCV AB S/CO SERPL IA: 0.15 S/CO — SIGNIFICANT CHANGE UP (ref 0–0.99)
HCV AB SERPL-IMP: SIGNIFICANT CHANGE UP
HGB BLD-MCNC: 8.5 G/DL — LOW (ref 13–17)
MCHC RBC-ENTMCNC: 28.5 PG — SIGNIFICANT CHANGE UP (ref 27–34)
MCHC RBC-ENTMCNC: 29.5 GM/DL — LOW (ref 32–36)
MCV RBC AUTO: 96.6 FL — SIGNIFICANT CHANGE UP (ref 80–100)
NRBC # BLD: 0 /100 WBCS — SIGNIFICANT CHANGE UP (ref 0–0)
PLATELET # BLD AUTO: 330 K/UL — SIGNIFICANT CHANGE UP (ref 150–400)
POTASSIUM SERPL-MCNC: 5.1 MMOL/L — SIGNIFICANT CHANGE UP (ref 3.5–5.3)
POTASSIUM SERPL-SCNC: 5.1 MMOL/L — SIGNIFICANT CHANGE UP (ref 3.5–5.3)
RBC # BLD: 2.98 M/UL — LOW (ref 4.2–5.8)
RBC # FLD: 17.6 % — HIGH (ref 10.3–14.5)
SODIUM SERPL-SCNC: 129 MMOL/L — LOW (ref 135–145)
WBC # BLD: 8.71 K/UL — SIGNIFICANT CHANGE UP (ref 3.8–10.5)
WBC # FLD AUTO: 8.71 K/UL — SIGNIFICANT CHANGE UP (ref 3.8–10.5)

## 2020-01-06 PROCEDURE — 86706 HEP B SURFACE ANTIBODY: CPT

## 2020-01-06 PROCEDURE — 97161 PT EVAL LOW COMPLEX 20 MIN: CPT

## 2020-01-06 PROCEDURE — 94660 CPAP INITIATION&MGMT: CPT

## 2020-01-06 PROCEDURE — 93990 DOPPLER FLOW TESTING: CPT

## 2020-01-06 PROCEDURE — 94640 AIRWAY INHALATION TREATMENT: CPT

## 2020-01-06 PROCEDURE — 84100 ASSAY OF PHOSPHORUS: CPT

## 2020-01-06 PROCEDURE — 82435 ASSAY OF BLOOD CHLORIDE: CPT

## 2020-01-06 PROCEDURE — 97530 THERAPEUTIC ACTIVITIES: CPT

## 2020-01-06 PROCEDURE — 83605 ASSAY OF LACTIC ACID: CPT

## 2020-01-06 PROCEDURE — 85014 HEMATOCRIT: CPT

## 2020-01-06 PROCEDURE — 82330 ASSAY OF CALCIUM: CPT

## 2020-01-06 PROCEDURE — 99261: CPT

## 2020-01-06 PROCEDURE — 36600 WITHDRAWAL OF ARTERIAL BLOOD: CPT

## 2020-01-06 PROCEDURE — 82962 GLUCOSE BLOOD TEST: CPT

## 2020-01-06 PROCEDURE — 80048 BASIC METABOLIC PNL TOTAL CA: CPT

## 2020-01-06 PROCEDURE — 87340 HEPATITIS B SURFACE AG IA: CPT

## 2020-01-06 PROCEDURE — 71045 X-RAY EXAM CHEST 1 VIEW: CPT

## 2020-01-06 PROCEDURE — 87633 RESP VIRUS 12-25 TARGETS: CPT

## 2020-01-06 PROCEDURE — 80076 HEPATIC FUNCTION PANEL: CPT

## 2020-01-06 PROCEDURE — 97116 GAIT TRAINING THERAPY: CPT

## 2020-01-06 PROCEDURE — 84132 ASSAY OF SERUM POTASSIUM: CPT

## 2020-01-06 PROCEDURE — 93306 TTE W/DOPPLER COMPLETE: CPT

## 2020-01-06 PROCEDURE — 80053 COMPREHEN METABOLIC PANEL: CPT

## 2020-01-06 PROCEDURE — 84295 ASSAY OF SERUM SODIUM: CPT

## 2020-01-06 PROCEDURE — 87798 DETECT AGENT NOS DNA AMP: CPT

## 2020-01-06 PROCEDURE — 70450 CT HEAD/BRAIN W/O DYE: CPT

## 2020-01-06 PROCEDURE — 87581 M.PNEUMON DNA AMP PROBE: CPT

## 2020-01-06 PROCEDURE — 93005 ELECTROCARDIOGRAM TRACING: CPT

## 2020-01-06 PROCEDURE — 82947 ASSAY GLUCOSE BLOOD QUANT: CPT

## 2020-01-06 PROCEDURE — 82803 BLOOD GASES ANY COMBINATION: CPT

## 2020-01-06 PROCEDURE — 86803 HEPATITIS C AB TEST: CPT

## 2020-01-06 PROCEDURE — 83735 ASSAY OF MAGNESIUM: CPT

## 2020-01-06 PROCEDURE — 87486 CHLMYD PNEUM DNA AMP PROBE: CPT

## 2020-01-06 PROCEDURE — 85027 COMPLETE CBC AUTOMATED: CPT

## 2020-01-06 PROCEDURE — 99285 EMERGENCY DEPT VISIT HI MDM: CPT | Mod: 25

## 2020-01-06 PROCEDURE — 73620 X-RAY EXAM OF FOOT: CPT

## 2020-01-06 PROCEDURE — 85730 THROMBOPLASTIN TIME PARTIAL: CPT

## 2020-01-06 PROCEDURE — 84484 ASSAY OF TROPONIN QUANT: CPT

## 2020-01-06 PROCEDURE — 85610 PROTHROMBIN TIME: CPT

## 2020-01-06 RX ORDER — POLYETHYLENE GLYCOL 3350 17 G/17G
17 POWDER, FOR SOLUTION ORAL
Qty: 0 | Refills: 0 | DISCHARGE
Start: 2020-01-06

## 2020-01-06 RX ORDER — ERYTHROPOIETIN 10000 [IU]/ML
20000 INJECTION, SOLUTION INTRAVENOUS; SUBCUTANEOUS
Qty: 0 | Refills: 0 | DISCHARGE
Start: 2020-01-06

## 2020-01-06 RX ORDER — ACETAMINOPHEN 500 MG
1000 TABLET ORAL ONCE
Refills: 0 | Status: COMPLETED | OUTPATIENT
Start: 2020-01-06 | End: 2020-01-06

## 2020-01-06 RX ORDER — SENNA PLUS 8.6 MG/1
2 TABLET ORAL
Qty: 0 | Refills: 0 | DISCHARGE
Start: 2020-01-06

## 2020-01-06 RX ORDER — OXYCODONE HYDROCHLORIDE 5 MG/1
5 TABLET ORAL ONCE
Refills: 0 | Status: DISCONTINUED | OUTPATIENT
Start: 2020-01-06 | End: 2020-01-06

## 2020-01-06 RX ADMIN — CARVEDILOL PHOSPHATE 6.25 MILLIGRAM(S): 80 CAPSULE, EXTENDED RELEASE ORAL at 18:48

## 2020-01-06 RX ADMIN — HEPARIN SODIUM 5000 UNIT(S): 5000 INJECTION INTRAVENOUS; SUBCUTANEOUS at 11:14

## 2020-01-06 RX ADMIN — Medication 400 MILLIGRAM(S): at 03:28

## 2020-01-06 RX ADMIN — SEVELAMER CARBONATE 2400 MILLIGRAM(S): 2400 POWDER, FOR SUSPENSION ORAL at 11:14

## 2020-01-06 RX ADMIN — ALBUTEROL 2 PUFF(S): 90 AEROSOL, METERED ORAL at 05:46

## 2020-01-06 RX ADMIN — OXYCODONE HYDROCHLORIDE 2.5 MILLIGRAM(S): 5 TABLET ORAL at 00:05

## 2020-01-06 RX ADMIN — Medication 500 MILLIGRAM(S): at 11:16

## 2020-01-06 RX ADMIN — RISPERIDONE 1 MILLIGRAM(S): 4 TABLET ORAL at 18:48

## 2020-01-06 RX ADMIN — Medication 0.5 MILLIGRAM(S): at 18:48

## 2020-01-06 RX ADMIN — Medication 1000 MILLIGRAM(S): at 04:00

## 2020-01-06 RX ADMIN — PANTOPRAZOLE SODIUM 40 MILLIGRAM(S): 20 TABLET, DELAYED RELEASE ORAL at 05:47

## 2020-01-06 RX ADMIN — Medication 20 MILLIGRAM(S): at 11:16

## 2020-01-06 RX ADMIN — RISPERIDONE 1 MILLIGRAM(S): 4 TABLET ORAL at 05:47

## 2020-01-06 RX ADMIN — Medication 1 MILLIGRAM(S): at 11:16

## 2020-01-06 RX ADMIN — ALBUTEROL 2 PUFF(S): 90 AEROSOL, METERED ORAL at 18:45

## 2020-01-06 RX ADMIN — OXYCODONE HYDROCHLORIDE 5 MILLIGRAM(S): 5 TABLET ORAL at 13:47

## 2020-01-06 RX ADMIN — CLOPIDOGREL BISULFATE 75 MILLIGRAM(S): 75 TABLET, FILM COATED ORAL at 11:16

## 2020-01-06 RX ADMIN — OXYCODONE HYDROCHLORIDE 5 MILLIGRAM(S): 5 TABLET ORAL at 14:20

## 2020-01-06 NOTE — CHART NOTE - NSCHARTNOTEFT_GEN_A_CORE
Nutrition Follow Up Note  Patient seen for: Follow up     Source: Patient, chart     Diet : Renal restriction, Nepro BID    41 y/o M pt with multiple co morbid conditions including ESRD on hemodialysis 3 x weekly, COPD on home oxygen at baseline, obesity, HTN, DM2, migraine HA, schizophrenia and bipolar disorder, came for eval of AVF due to decreased thrill, then during ED stay RN who found patient slumped over in bed requiring sternal rub. Advanced care planning was discussed with patient and family, d/c planning.     Patient reports: AAOx3, lethargic. Pt still reports varying appetite and PO intake. Dietetic intern observed pt ate 100% of his meal. Pt reports he enjoys the Nepro oral supplements and drinks 100%. No complaints of N+V, diarrhea or constipation. Last BM yesterday per patient. Requests more Nepro, reports he does not always get them on his meal tray.     PO intake: Variable     Source for PO intake: Pt, EMR, observed meal tray during lunchtime     Weights:   8.6% loss x 2 weeks-- likely due to bed scale error, fluids shifts, and HD.    Pertinent Medications: Heparin, humalog, miralax, senna, vitamin C, folic acid, Lasix, protonix   Pertinent Labs: (01/06) Na 129, BUN 62, Cr 10.67     Skin per nursing documentation: Intact, no pressure injuries   Edema: +2 right and left leg and ankle as per nurse's flowsheets     Estimated Needs:   [x ] no change since previous assessment  [ ] recalculated:     Previous Nutrition Diagnosis:   Nutrition Diagnosis is:  1. Increased Nutrient Needs 2. Food & Nutrition Related Knowledge Deficit.       Interventions:     Recommend  1) Continue current renal restriction diet order and Nepro BID 2) Add Nephro-mauricio multivitamin 1x day    Monitoring and Evaluation:     Continue to monitor Nutritional intake, Tolerance to diet prescription, weights, labs, skin integrity    RD remains available upon request and will follow up per protocol  Carisa Dye, Dietetic Intern x [phone] Nutrition Follow Up Note  Patient seen for: Follow up     Source: Patient, chart     Diet : Renal restriction, Nepro BID    39 y/o M pt with multiple co morbid conditions including ESRD on hemodialysis 3 x weekly, COPD on home oxygen at baseline, obesity, HTN, DM2, migraine HA, schizophrenia and bipolar disorder, came for eval of AVF due to decreased thrill, then during ED stay RN who found patient slumped over in bed requiring sternal rub. Advanced care planning was discussed with patient and family, d/c planning.     Patient reports: AAOx3, lethargic. Pt still reports varying appetite and PO intake. Dietetic intern observed pt ate 100% of his meal. Pt reports he likes and drinks 100% of the Nepro oral supplements. No complaints of N+V, diarrhea or constipation. Last BM yesterday per patient. Requests more Nepro, reports he does not always get them on his meal tray.     PO intake: Variable     Source for PO intake: Pt, EMR, observed meal tray during lunchtime     Weights:   8.6% loss x 2 weeks-- likely due to bed scale error, fluids shifts, and HD.    Pertinent Medications: Heparin, humalog, miralax, senna, vitamin C, folic acid, Lasix, protonix   Pertinent Labs: (01/06) Na 129, BUN 62, Cr 10.67     Skin per nursing documentation: Intact, no pressure injuries   Edema: +2 right and left leg and ankle as per nurse's flowsheets     Estimated Needs:   [x ] no change since previous assessment  [ ] recalculated:     Previous Nutrition Diagnosis:   Nutrition Diagnosis is:  1. Increased Nutrient Needs 2. Food & Nutrition Related Knowledge Deficit.       Interventions:     Recommend  1) Continue current renal restriction diet order and Nepro BID 2) Add Nephro-mauricio multivitamin 1x day    Monitoring and Evaluation:     Continue to monitor Nutritional intake, Tolerance to diet prescription, weights, labs, skin integrity    RD remains available upon request and will follow up per protocol  Carisa Dye, Dietetic Intern x [phone] Nutrition Follow Up Note  Patient seen for: LOS follow up. Chart reviewed, events noted. 39 y/o M pt with multiple co morbid conditions including ESRD on hemodialysis 3 x weekly, COPD on home oxygen at baseline, obesity, HTN, DM2, migraine HA, schizophrenia and bipolar disorder, came for eval of AVF due to decreased thrill, then during ED stay RN who found patient slumped over in bed requiring sternal rub. Advanced care planning was discussed with patient and family, d/c planning.     Source: Patient, EMR    Diet : Renal restriction, Nepro BID    Patient reports: AAOx3, lethargic. Pt continues to report varying appetite and PO intake. Dietetic intern observed pt ate 100% of his meal. Pt reports he likes and drinks 100% of the Nepro oral supplements. No complaints of N+V, diarrhea or constipation. Last BM yesterday per patient. Requests more Nepro, reports he does not always get them on his meal tray.     PO intake: Variable     Source for PO intake: Pt, EMR, observed meal tray during lunchtime     Weights:   8.6% loss x 2 weeks-- likely due to bed scale error, fluids shifts, and HD.    Pertinent Medications: Heparin, humalog, miralax, senna, vitamin C, folic acid, Lasix, protonix   Pertinent Labs: (01/06) Na 129, BUN 62, Cr 10.67     Skin per nursing documentation: Intact, no pressure injuries   Edema: +2 right and left leg and ankle as per nurse's flowsheets     Estimated Needs:   [x ] no change since previous assessment  [ ] recalculated:     Previous Nutrition Diagnosis:   Nutrition Diagnosis is:  1. Increased Nutrient Needs 2. Food & Nutrition Related Knowledge Deficit.       Interventions:     Recommend  1) Continue current renal restriction diet order and Nepro BID 2) Add Nephro-mauricio multivitamin 1x day    Monitoring and Evaluation:     Continue to monitor Nutritional intake, Tolerance to diet prescription, weights, labs, skin integrity    RD remains available upon request and will follow up per protocol  Carisa Dye, Dietetic Intern x [phone] Nutrition Follow Up Note  Patient seen for: LOS follow up. Chart reviewed, events noted. 39 y/o M pt with multiple co morbid conditions including ESRD on hemodialysis 3 x weekly, COPD on home oxygen at baseline, obesity, HTN, DM2, migraine HA, schizophrenia and bipolar disorder, came for eval of AVF due to decreased thrill, then during ED stay RN who found patient slumped over in bed requiring sternal rub. Advanced care planning was discussed with patient and family, d/c planning.     Source: Patient, EMR    Diet : Renal restriction, Nepro BID    Patient reports: AAOx3, lethargic. Pt continues to report varying appetite and PO intake. Dietetic intern observed pt ate 100% of his meal. Pt reports he likes and drinks 100% of the Nepro oral supplements. No complaints of N+V, diarrhea or constipation. Last BM yesterday per patient. Requests more Nepro, reports he does not always get them on his meal tray.     PO intake: Variable     Source for PO intake: Pt, EMR, observed meal tray during lunchtime     Weights:   8.6% loss x 2 weeks-- likely due to bed scale error, fluids shifts, and HD.    Pertinent Medications: Heparin, humalog, miralax, senna, vitamin C, folic acid, Lasix, protonix   Pertinent Labs: (01/06) Na 129, BUN 62, Cr 10.67     Skin per nursing documentation: Intact, no pressure injuries   Edema: +2 right and left leg and ankle as per nurse's flowsheets     Estimated Needs:   [x ] no change since previous assessment  [ ] recalculated:     Previous Nutrition Diagnosis:   Nutrition Diagnosis is:  1. Increased Nutrient Needs 2. Food & Nutrition Related Knowledge Deficit. -- ongoing        Interventions:     Recommend  1) Continue current renal restriction diet order and Nepro BID 2) Add Nephro-mauricio multivitamin 1x day    Monitoring and Evaluation:     Continue to monitor Nutritional intake, Tolerance to diet prescription, weights, labs, skin integrity    RD remains available upon request and will follow up per protocol  Carisa Dye, Dietetic Intern x [phone] Nutrition Follow Up Note  Patient seen for: LOS follow up. Chart reviewed, events noted. 41 y/o M pt with multiple co morbid conditions including ESRD on hemodialysis 3 x weekly, COPD on home oxygen at baseline, obesity, HTN, DM2, migraine HA, schizophrenia and bipolar disorder, came for eval of AVF due to decreased thrill, then during ED stay RN who found patient slumped over in bed requiring sternal rub. Advanced care planning was discussed with patient and family, d/c planning.     Source: Patient, EMR    Diet : Renal restriction, Nepro BID    Patient reports: AAOx3, lethargic. Pt continues to report varying appetite and PO intake depending upon when pt returns from dialysis, sometimes too fatigued to eat meal. Dietetic intern observed pt ate 100% of his meal. Pt reports he likes and drinks 100% of the Nepro oral supplements. No complaints of N+V, diarrhea or constipation. Last BM yesterday per patient. Requests more Nepro, reports he does not always get them on his meal tray.     PO intake: Variable, slept through meal or 100% per flow sheets    Source for PO intake: Pt, EMR, observed meal tray during lunchtime     Weights: 394 pounds (1/6)- post HD, 399 pounds (1/4)- post HD, 390 pounds (1/3)-post HD- wt fluctutations likely due to bed scale error, fluids shifts, and HD.    Pertinent Medications: Heparin, humalog, miralax, senna, vitamin C, folic acid, Lasix, protonix   Pertinent Labs: (01/06) Na 129, BUN 62, Cr 10.67     Skin per nursing documentation: Intact, no pressure injuries   Edema: +2 right and left leg and ankle as per nurse's flowsheets     Estimated Needs:   [x ] no change since previous assessment  [ ] recalculated:     Previous Nutrition Diagnosis:  1. Increased Nutrient Needs 2. Food & Nutrition Related Knowledge Deficit  Nutrition Diagnosis is: . -- ongoing, addressed by Nutrition supplement Nepro x2 daily, previous nutrition education      Recommend  1) Continue current renal restriction diet order and Nepro BID   2) Add Nephro-mauricio multivitamin 1x day    Monitoring and Evaluation:   Continue to monitor Nutritional intake, Tolerance to diet prescription, weights, labs, skin integrity    RD remains available upon request and will follow up per protocol  Carisa Dye, Dietetic Intern x [phone]

## 2020-01-06 NOTE — PROGRESS NOTE ADULT - ASSESSMENT
ACute on chronic hypercapneic respiratory failure  Morbid obesity: r/o sleep apnea, obesity h ypoventilation  Acute on chronci HfPef  Pulmonary edme  ESRD with anasarca, fluid overload    REC    1/6: stable hypercapnea with suboptimal compensation  Patient refuses BIPAP or CPAP  HD per renal  DC planning

## 2020-01-06 NOTE — PROGRESS NOTE ADULT - PROBLEM SELECTOR PLAN 2
Off most anti-HTN meds, tolerating HD and UF well  low BP, asympt  consider d/c Imdur. c/w coreg, hold AM of hd  c/w lasix 80mg po qd

## 2020-01-06 NOTE — DISCHARGE NOTE NURSING/CASE MANAGEMENT/SOCIAL WORK - NSDCPEEMAIL_GEN_ALL_CORE
[facility] for Tobacco Control email [email or web address]

## 2020-01-06 NOTE — PROGRESS NOTE ADULT - ASSESSMENT
39 y/o M pt with multiple co morbid conditions including ESRD on hemodialysis 3 x weekly, COPD on home oxygen at baseline, obesity, HTN, DM2, migraine HA, schizophrenia and bipolar disorder, came for eval of AVF due to decreased thrill, then during ED stay RN who found patient slumped over in bed requiring sternal rub.  at time of evaluation. vitals stable. admitted for near syncope work up  Patient receives HD at [facility] via [facility] [place] [place] AVF MWF, missed Sunday holiday scheduled HD  Renal following for ESRD Mx.     labs, chart, rad reviewed

## 2020-01-06 NOTE — PROGRESS NOTE ADULT - SUBJECTIVE AND OBJECTIVE BOX
Subjective: Patient seen and examined. No new events except as noted.   Seen in am   no cp or sob       REVIEW OF SYSTEMS:    CONSTITUTIONAL: +weakness, fevers or chills  EYES/ENT: No visual changes;  No vertigo or throat pain   NECK: No pain or stiffness  RESPIRATORY: No cough, wheezing, hemoptysis; No shortness of breath  CARDIOVASCULAR: No chest pain or palpitations  GASTROINTESTINAL: No abdominal or epigastric pain. No nausea, vomiting, or hematemesis; No diarrhea or constipation. No melena or hematochezia.  GENITOURINARY: No dysuria, frequency or hematuria  NEUROLOGICAL: No numbness or weakness  SKIN: No itching, burning, rashes, or lesions   All other review of systems is negative unless indicated above.    MEDICATIONS:  MEDICATIONS  (STANDING):  ALBUTerol    90 MICROgram(s) HFA Inhaler 2 Puff(s) Inhalation every 6 hours  ascorbic acid 500 milliGRAM(s) Oral daily  carvedilol 6.25 milliGRAM(s) Oral every 12 hours  chlorhexidine 4% Liquid 1 Application(s) Topical once  clonazePAM  Tablet 0.5 milliGRAM(s) Oral every 12 hours  clopidogrel Tablet 75 milliGRAM(s) Oral daily  dextrose 5%. 1000 milliLiter(s) (50 mL/Hr) IV Continuous <Continuous>  dextrose 50% Injectable 12.5 Gram(s) IV Push once  dextrose 50% Injectable 25 Gram(s) IV Push once  dextrose 50% Injectable 25 Gram(s) IV Push once  epoetin cyndie Injectable 38363 Unit(s) IV Push <User Schedule>  folic acid 1 milliGRAM(s) Oral daily  furosemide    Tablet 80 milliGRAM(s) Oral daily  gabapentin 300 milliGRAM(s) Oral at bedtime  heparin  Injectable 5000 Unit(s) SubCutaneous every 8 hours  insulin lispro (HumaLOG) corrective regimen sliding scale   SubCutaneous three times a day before meals  isosorbide   mononitrate ER Tablet (IMDUR) 30 milliGRAM(s) Oral daily  mirtazapine 7.5 milliGRAM(s) Oral at bedtime  pantoprazole    Tablet 40 milliGRAM(s) Oral before breakfast  PARoxetine 20 milliGRAM(s) Oral daily  polyethylene glycol 3350 17 Gram(s) Oral daily  risperiDONE   Tablet 1 milliGRAM(s) Oral every 12 hours  senna 2 Tablet(s) Oral at bedtime  sevelamer carbonate 2400 milliGRAM(s) Oral three times a day with meals  simvastatin 40 milliGRAM(s) Oral at bedtime      PHYSICAL EXAM:  T(C): 36.8 (01-06-20 @ 18:43), Max: 37.2 (01-05-20 @ 20:42)  HR: 83 (01-06-20 @ 18:43) (65 - 83)  BP: 155/74 (01-06-20 @ 18:43) (105/56 - 155/74)  RR: 18 (01-06-20 @ 18:43) (16 - 18)  SpO2: 98% (01-06-20 @ 18:43) (96% - 100%)  Wt(kg): --  I&O's Summary    05 Jan 2020 07:01  -  06 Jan 2020 07:00  --------------------------------------------------------  IN: 340 mL / OUT: 0 mL / NET: 340 mL    06 Jan 2020 07:01  -  06 Jan 2020 19:25  --------------------------------------------------------  IN: 240 mL / OUT: 4300 mL / NET: -4060 mL          Appearance: Morbidly obese   Normall oral mucosa, PERRL, EOMI	  Lymphatic: No lymphadenopathy , no edema  Cardiovascular: Normal S1 S2, No JVD, No murmurs , Peripheral pulses palpable 2+ bilaterally  Respiratory: Lungs clear to auscultation, normal effort 	  Gastrointestinal:  Soft, Non-tender, + BS	  Skin: No rashes, No ecchymoses, No cyanosis, warm to touch  Musculoskeletal: Normal range of motion, normal strength  Psychiatry:  Mood & affect appropriate  Ext: No edema      LABS:    CARDIAC MARKERS:                                8.5    8.71  )-----------( 330      ( 06 Jan 2020 05:49 )             28.8     01-06    129<L>  |  89<L>  |  62<H>  ----------------------------<  97  5.1   |  24  |  10.67<H>    Ca    10.2      06 Jan 2020 05:49      proBNP:   Lipid Profile:   HgA1c:   TSH:             TELEMETRY: 	  SR  ECG:  	  RADIOLOGY:   DIAGNOSTIC TESTING:  [ ] Echocardiogram:  [ ]  Catheterization:  [ ] Stress Test:    OTHER:

## 2020-01-06 NOTE — PROGRESS NOTE ADULT - SUBJECTIVE AND OBJECTIVE BOX
[facility] [place] [facility] - HUSSAIN Sanchez / HUSSAIN Villalobos / ERNESTO Mehta/ HUSSAIN Mauricio/ HUSSAIN Barber/ IVONNE Hui / JADEN La / BRANDON Alexander  ---------------------------------------------------------------------------------------------------------------  seen and examined today for ESRd on HD  Interval : due for HD today  VITALS:  T(F): 97.7 (01-06-20 @ 12:24), Max: 99 (01-05-20 @ 18:49)  HR: 65 (01-06-20 @ 12:24)  BP: 125/67 (01-06-20 @ 12:24)  RR: 18 (01-06-20 @ 12:24)  SpO2: 99% (01-06-20 @ 12:24)  Wt(kg): --    01-05 @ 07:01  -  01-06 @ 07:00  --------------------------------------------------------  IN: 340 mL / OUT: 0 mL / NET: 340 mL      Physical Exam :-  Constitutional: NAD  Neck: Supple.  Respiratory: Bilateral equal breath sounds,  Cardiovascular: S1, S2 normal,  Gastrointestinal: Bowel Sounds present, soft, non tender.  Extremities: 2+ edema  Neurological: Alert and Oriented x 3, no focal deficits  Psychiatric: Normal mood, normal affect  Data:-  Allergies :   aspirin (Swelling)  fish (Unknown)  penicillins (Swelling)  shellfish (Unknown)    Hospital Medications:   MEDICATIONS  (STANDING):  ALBUTerol    90 MICROgram(s) HFA Inhaler 2 Puff(s) Inhalation every 6 hours  ascorbic acid 500 milliGRAM(s) Oral daily  carvedilol 6.25 milliGRAM(s) Oral every 12 hours  chlorhexidine 4% Liquid 1 Application(s) Topical once  clonazePAM  Tablet 0.5 milliGRAM(s) Oral every 12 hours  clopidogrel Tablet 75 milliGRAM(s) Oral daily  dextrose 5%. 1000 milliLiter(s) (50 mL/Hr) IV Continuous <Continuous>  dextrose 50% Injectable 12.5 Gram(s) IV Push once  dextrose 50% Injectable 25 Gram(s) IV Push once  dextrose 50% Injectable 25 Gram(s) IV Push once  epoetin cyndie Injectable 77515 Unit(s) IV Push <User Schedule>  folic acid 1 milliGRAM(s) Oral daily  furosemide    Tablet 80 milliGRAM(s) Oral daily  gabapentin 300 milliGRAM(s) Oral at bedtime  heparin  Injectable 5000 Unit(s) SubCutaneous every 8 hours  insulin lispro (HumaLOG) corrective regimen sliding scale   SubCutaneous three times a day before meals  isosorbide   mononitrate ER Tablet (IMDUR) 30 milliGRAM(s) Oral daily  mirtazapine 7.5 milliGRAM(s) Oral at bedtime  pantoprazole    Tablet 40 milliGRAM(s) Oral before breakfast  PARoxetine 20 milliGRAM(s) Oral daily  polyethylene glycol 3350 17 Gram(s) Oral daily  risperiDONE   Tablet 1 milliGRAM(s) Oral every 12 hours  senna 2 Tablet(s) Oral at bedtime  sevelamer carbonate 2400 milliGRAM(s) Oral three times a day with meals  simvastatin 40 milliGRAM(s) Oral at bedtime    01-06    129<L>  |  89<L>  |  62<H>  ----------------------------<  97  5.1   |  24  |  10.67<H>    Ca    10.2      06 Jan 2020 05:49      Creatinine Trend: 10.67 <--, 9.26 <--, 7.66 <--, 11.31 <--, 10.52 <--, 9.02 <--                        8.5    8.71  )-----------( 330      ( 06 Jan 2020 05:49 )             28.8

## 2020-01-06 NOTE — PROGRESS NOTE ADULT - SUBJECTIVE AND OBJECTIVE BOX
Follow-up Pulm Progress Note  Manuelito Mendez MD  [phone]    Stable cardio-resp status  Afebrile and hemodynamically stable  ABG unchanged: PCO2 in 60's with mild acidosis due limited metbolic compensation due to ESRD  Denies dyspnea: eating breakfast     Vital Signs Last 24 Hrs  T(C): 36.7 (06 Jan 2020 04:26), Max: 37.2 (05 Jan 2020 18:49)  T(F): 98.1 (06 Jan 2020 04:26), Max: 99 (05 Jan 2020 18:49)  HR: 78 (06 Jan 2020 04:26) (69 - 78)  BP: 107/54 (06 Jan 2020 04:26) (96/60 - 108/57)  BP(mean): --  RR: 17 (06 Jan 2020 04:26) (17 - 18)  SpO2: 96% (06 Jan 2020 04:26) (96% - 99%)    ABG - ( 05 Jan 2020 16:00 )  pH, Arterial: 7.28  pH, Blood: x     /  pCO2: 61    /  pO2: 161   / HCO3: 28    / Base Excess: .7    /  SaO2: 99                            8.5    8.71  )-----------( 330      ( 06 Jan 2020 05:49 )             28.8       01-06    129<L>  |  89<L>  |  62<H>  ----------------------------<  97  5.1   |  24  |  10.67<H>    Ca    10.2      06 Jan 2020 05:49      Physical Examination:  PULM: No wheeze or rhonchi  CVS: Regular rate and rhythm, no murmurs, rubs, or gallops  ABD: Soft, non-tender  EXT:  No clubbing, cyanosis, or edema    RADIOLOGY REVIEWED  CXR:    CT chest:    TTE:

## 2020-01-06 NOTE — DISCHARGE NOTE NURSING/CASE MANAGEMENT/SOCIAL WORK - NSDCPEWEB_GEN_ALL_CORE
[facility] for Tobacco Control website --- [email or web address] website --- [email or web address]

## 2020-01-06 NOTE — PROGRESS NOTE ADULT - REASON FOR ADMISSION
presyncope

## 2020-01-06 NOTE — PROGRESS NOTE ADULT - PROBLEM SELECTOR PLAN 1
s/p HD 1/3  hypervolemic, hyponatremia 2/2 dilutional  hyperkalemia- K was sent in wrong tube. EKG-no acute changes of high K  ABG done now- reviewed k 5.2, no need for medical Mx of hyperkalemia or urgent hd tonight. d/w primary team bedside  c/w renal diet. fluid restriction 1.2L/day, d/w pt  HD today with UF as tolerated  dose all meds for ESRD  anemia in CKD- c/w epogen 20k tiw w/hd. d/c ferrous sulfate  [facility]

## 2020-01-06 NOTE — DISCHARGE NOTE NURSING/CASE MANAGEMENT/SOCIAL WORK - PATIENT PORTAL LINK FT
You can access the FollowMyHealth Patient Portal offered by [facility] by registering at the following website: [email or web address]. By joining [facility]’s FollowMyHealth portal, you will also be able to view your health information using other applications (apps) compatible with our system.

## 2020-01-07 ENCOUNTER — FORM ENCOUNTER (OUTPATIENT)
Age: 40
End: 2020-01-07

## 2020-01-07 ENCOUNTER — APPOINTMENT (OUTPATIENT)
Dept: ENDOVASCULAR SURGERY | Facility: CLINIC | Age: 40
End: 2020-01-07
Payer: MEDICARE

## 2020-01-08 ENCOUNTER — APPOINTMENT (OUTPATIENT)
Dept: ENDOVASCULAR SURGERY | Facility: CLINIC | Age: 40
End: 2020-01-08
Payer: MEDICARE

## 2020-01-08 VITALS
RESPIRATION RATE: 16 BRPM | TEMPERATURE: 98.6 F | HEART RATE: 83 BPM | SYSTOLIC BLOOD PRESSURE: 130 MMHG | WEIGHT: 315 LBS | HEIGHT: 78 IN | OXYGEN SATURATION: 99 % | DIASTOLIC BLOOD PRESSURE: 60 MMHG | BODY MASS INDEX: 36.45 KG/M2

## 2020-01-08 PROCEDURE — 36903Z: CUSTOM

## 2020-01-08 RX ORDER — SEVELAMER CARBONATE 800 MG/1
800 TABLET, FILM COATED ORAL
Refills: 0 | Status: ACTIVE | COMMUNITY

## 2020-01-08 RX ORDER — METHOCARBAMOL 750 MG/1
750 TABLET, FILM COATED ORAL
Refills: 0 | Status: ACTIVE | COMMUNITY

## 2020-01-08 RX ORDER — FUROSEMIDE 80 MG/1
80 TABLET ORAL
Refills: 0 | Status: ACTIVE | COMMUNITY

## 2020-01-08 RX ORDER — POLYETHYLENE GLYCOL 3350 17 G/17G
17 POWDER, FOR SOLUTION ORAL
Refills: 0 | Status: ACTIVE | COMMUNITY

## 2020-01-08 RX ORDER — FERROUS SULFATE 325(65) MG
325 TABLET ORAL
Refills: 0 | Status: ACTIVE | COMMUNITY

## 2020-01-08 RX ORDER — HYALURONATE SODIUM, STABILIZED 88 MG/4 ML
88 SYRINGE (ML) INTRAARTICULAR
Qty: 1 | Refills: 0 | Status: DISCONTINUED | OUTPATIENT
Start: 2019-07-03 | End: 2020-01-08

## 2020-01-08 RX ORDER — RISPERIDONE 1 MG/1
1 TABLET, FILM COATED ORAL
Refills: 0 | Status: ACTIVE | COMMUNITY

## 2020-01-08 RX ORDER — SEVELAMER CARBONATE 800 MG/1
800 TABLET, FILM COATED ORAL
Refills: 0 | Status: DISCONTINUED | COMMUNITY
End: 2020-01-08

## 2020-01-08 RX ORDER — OMEPRAZOLE 20 MG/1
20 CAPSULE, DELAYED RELEASE ORAL
Refills: 0 | Status: DISCONTINUED | COMMUNITY
End: 2020-01-08

## 2020-01-08 RX ORDER — PAROXETINE HYDROCHLORIDE 20 MG/1
20 TABLET, FILM COATED ORAL
Refills: 0 | Status: ACTIVE | COMMUNITY

## 2020-01-08 RX ORDER — CLONAZEPAM 0.5 MG/1
0.5 TABLET ORAL
Refills: 0 | Status: ACTIVE | COMMUNITY

## 2020-01-08 RX ORDER — CINACALCET HYDROCHLORIDE 30 MG/1
30 TABLET, COATED ORAL
Refills: 0 | Status: DISCONTINUED | COMMUNITY
End: 2020-01-08

## 2020-01-08 RX ORDER — BUDESONIDE AND FORMOTEROL FUMARATE DIHYDRATE 160; 4.5 UG/1; UG/1
160-4.5 AEROSOL RESPIRATORY (INHALATION)
Refills: 0 | Status: ACTIVE | COMMUNITY

## 2020-01-08 RX ORDER — FOLIC ACID 1 MG/1
1 TABLET ORAL
Refills: 0 | Status: ACTIVE | COMMUNITY

## 2020-01-08 RX ORDER — TRAMADOL HYDROCHLORIDE 25 MG/1
TABLET, COATED ORAL
Refills: 0 | Status: ACTIVE | COMMUNITY

## 2020-01-08 RX ORDER — AMLODIPINE BESYLATE 10 MG/1
10 TABLET ORAL
Refills: 0 | Status: DISCONTINUED | COMMUNITY
End: 2020-01-08

## 2020-01-08 NOTE — HISTORY OF PRESENT ILLNESS
[FreeTextEntry1] : 2016  in [place] [FreeTextEntry4] : today [FreeTextEntry6] : Dr. Jimenez [FreeTextEntry5] : yesterday 10am

## 2020-01-08 NOTE — PAST MEDICAL HISTORY
[Altered mobility] : Altered mobility [Obesity: BMI >25] : Obesity: BMI >25 [FreeTextEntry1] : Malignant Hyperthermia Screening Tool and Risk of Bleeding Assessment\par \par Mr. PRESTON JOHNSON denies family history of unexpected death following Anesthesia or Exercise.\par Denies Family history of Malignant Hyperthermia, Muscle or Neuromuscular disorder and High Temperature following exercise.\par \par Mr. PRESTON JOHNSON denies history of Muscle Spasm, Dark or Chocolate - Colored urine and Unanticipated fever immediately following anesthesia or serious exercise. \par Mr. JOHNSON also denies bleeding tendencies/ Risks of Bleeding.\par

## 2020-01-24 ENCOUNTER — EMERGENCY (EMERGENCY)
Facility: HOSPITAL | Age: 40
LOS: 1 days | Discharge: HOME | End: 2020-01-24
Attending: EMERGENCY MEDICINE
Payer: MEDICARE

## 2020-01-24 VITALS
HEART RATE: 82 BPM | OXYGEN SATURATION: 98 % | TEMPERATURE: 99 F | DIASTOLIC BLOOD PRESSURE: 57 MMHG | HEIGHT: 70 IN | SYSTOLIC BLOOD PRESSURE: 102 MMHG | RESPIRATION RATE: 16 BRPM | WEIGHT: 315 LBS

## 2020-01-24 VITALS
HEART RATE: 100 BPM | OXYGEN SATURATION: 99 % | SYSTOLIC BLOOD PRESSURE: 132 MMHG | RESPIRATION RATE: 18 BRPM | DIASTOLIC BLOOD PRESSURE: 90 MMHG | TEMPERATURE: 99 F

## 2020-01-24 DIAGNOSIS — Z98.890 OTHER SPECIFIED POSTPROCEDURAL STATES: Chronic | ICD-10-CM

## 2020-01-24 LAB
ANION GAP SERPL CALC-SCNC: 6 MMOL/L — SIGNIFICANT CHANGE UP (ref 5–17)
BASOPHILS # BLD AUTO: 0.05 K/UL — SIGNIFICANT CHANGE UP (ref 0–0.2)
BASOPHILS NFR BLD AUTO: 0.7 % — SIGNIFICANT CHANGE UP (ref 0–2)
BUN SERPL-MCNC: 17 MG/DL — SIGNIFICANT CHANGE UP (ref 7–18)
CALCIUM SERPL-MCNC: 11.5 MG/DL — HIGH (ref 8.4–10.5)
CHLORIDE SERPL-SCNC: 94 MMOL/L — LOW (ref 96–108)
CO2 SERPL-SCNC: 34 MMOL/L — HIGH (ref 22–31)
CREAT SERPL-MCNC: 3.76 MG/DL — HIGH (ref 0.5–1.3)
EOSINOPHIL # BLD AUTO: 0.53 K/UL — HIGH (ref 0–0.5)
EOSINOPHIL NFR BLD AUTO: 7.8 % — HIGH (ref 0–6)
GLUCOSE SERPL-MCNC: 97 MG/DL — SIGNIFICANT CHANGE UP (ref 70–99)
HCT VFR BLD CALC: 43.8 % — SIGNIFICANT CHANGE UP (ref 39–50)
HGB BLD-MCNC: 12.6 G/DL — LOW (ref 13–17)
IMM GRANULOCYTES NFR BLD AUTO: 0.3 % — SIGNIFICANT CHANGE UP (ref 0–1.5)
LYMPHOCYTES # BLD AUTO: 0.71 K/UL — LOW (ref 1–3.3)
LYMPHOCYTES # BLD AUTO: 10.5 % — LOW (ref 13–44)
MCHC RBC-ENTMCNC: 28.4 PG — SIGNIFICANT CHANGE UP (ref 27–34)
MCHC RBC-ENTMCNC: 28.8 GM/DL — LOW (ref 32–36)
MCV RBC AUTO: 98.9 FL — SIGNIFICANT CHANGE UP (ref 80–100)
MONOCYTES # BLD AUTO: 0.68 K/UL — SIGNIFICANT CHANGE UP (ref 0–0.9)
MONOCYTES NFR BLD AUTO: 10.1 % — SIGNIFICANT CHANGE UP (ref 2–14)
NEUTROPHILS # BLD AUTO: 4.77 K/UL — SIGNIFICANT CHANGE UP (ref 1.8–7.4)
NEUTROPHILS NFR BLD AUTO: 70.6 % — SIGNIFICANT CHANGE UP (ref 43–77)
NRBC # BLD: 0 /100 WBCS — SIGNIFICANT CHANGE UP (ref 0–0)
PLATELET # BLD AUTO: 256 K/UL — SIGNIFICANT CHANGE UP (ref 150–400)
POTASSIUM SERPL-MCNC: 4.2 MMOL/L — SIGNIFICANT CHANGE UP (ref 3.5–5.3)
POTASSIUM SERPL-SCNC: 4.2 MMOL/L — SIGNIFICANT CHANGE UP (ref 3.5–5.3)
RBC # BLD: 4.43 M/UL — SIGNIFICANT CHANGE UP (ref 4.2–5.8)
RBC # FLD: 17.3 % — HIGH (ref 10.3–14.5)
SODIUM SERPL-SCNC: 134 MMOL/L — LOW (ref 135–145)
WBC # BLD: 6.76 K/UL — SIGNIFICANT CHANGE UP (ref 3.8–10.5)
WBC # FLD AUTO: 6.76 K/UL — SIGNIFICANT CHANGE UP (ref 3.8–10.5)

## 2020-01-24 PROCEDURE — 85027 COMPLETE CBC AUTOMATED: CPT

## 2020-01-24 PROCEDURE — 99283 EMERGENCY DEPT VISIT LOW MDM: CPT

## 2020-01-24 PROCEDURE — 80048 BASIC METABOLIC PNL TOTAL CA: CPT

## 2020-01-24 PROCEDURE — 36415 COLL VENOUS BLD VENIPUNCTURE: CPT

## 2020-01-24 RX ORDER — ACETAMINOPHEN 500 MG
650 TABLET ORAL ONCE
Refills: 0 | Status: COMPLETED | OUTPATIENT
Start: 2020-01-24 | End: 2020-01-24

## 2020-01-24 RX ORDER — LIDOCAINE 4 G/100G
1 CREAM TOPICAL ONCE
Refills: 0 | Status: COMPLETED | OUTPATIENT
Start: 2020-01-24 | End: 2020-01-24

## 2020-01-24 RX ADMIN — LIDOCAINE 1 PATCH: 4 CREAM TOPICAL at 13:48

## 2020-01-24 NOTE — ED PROVIDER NOTE - OBJECTIVE STATEMENT
Pt is a 39y M with significant PMHx of end stage renal disease, copd, dm, htn, chronic home oxygen and significant PSHx of hernia repair presenting to the ED feeling fatigued after completing his dialysis. Pt notes his pressure dropped during dialysis which doesn't usually happen and they took 6L off. Pt feels a little better at present and notes lower back pain to the sides. Pt has aspirin, penicillin allergies and currently denies any additional complaints at this time.

## 2020-01-24 NOTE — ED ADULT NURSE NOTE - NSIMPLEMENTINTERV_GEN_ALL_ED
Implemented All Universal Safety Interventions:  [place] to call system. Call bell, personal items and telephone within reach. Instruct patient to call for assistance. Room bathroom lighting operational. Non-slip footwear when patient is off stretcher. Physically safe environment: no spills, clutter or unnecessary equipment. Stretcher in lowest position, wheels locked, appropriate side rails in place.

## 2020-01-24 NOTE — ED PROVIDER NOTE - PATIENT PORTAL LINK FT
You can access the FollowMyHealth Patient Portal offered by [facility] by registering at the following website: [email or web address]. By joining [facility]’s FollowMyHealth portal, you will also be able to view your health information using other applications (apps) compatible with our system.

## 2020-01-24 NOTE — ED ADULT TRIAGE NOTE - PAIN: PRESENCE, MLM
"Anesthesia Transfer of Care Note    Patient: Alan Fairbanks Jr.    Procedure(s) Performed: Procedure(s) (LRB):  ESOPHAGOGASTRODUODENOSCOPY (EGD) (N/A)    Patient location: [facility]    Anesthesia Type: general    Transport from OR: Transported from OR on 2-3 L/min O2 by NC with adequate spontaneous ventilation    Post pain: adequate analgesia    Post assessment: no apparent anesthetic complications    Post vital signs: stable    Level of consciousness: awake    Nausea/Vomiting: no nausea/vomiting    Complications: none    Transfer of care protocol was followed      Last vitals:   Visit Vitals  /65   Pulse 79   Temp 36.7 °C (98.1 °F)   Resp 17   Ht 5' 10" (1.778 m)   Wt 119.4 kg (263 lb 4 oz)   SpO2 97%   BMI 37.77 kg/m²     " denies pain/discomfort

## 2020-01-24 NOTE — ED PROVIDER NOTE - CLINICAL SUMMARY MEDICAL DECISION MAKING FREE TEXT BOX
Likely transient hypertension secondary to dialysis. Will check orthostatics and basic labs and likely reassess.

## 2020-01-24 NOTE — ED ADULT NURSE NOTE - CCCP TRG CHIEF CMPLNT
Patient, Lucy Perez (MRN #[id number]), presented with a recorded BMI of 38.62 kg/m^2 and a documented comorbidity(s):  - Diabetes Mellitus Type 2  - Hypertension  to which the severe obesity is a contributing factor. This is consistent with the definition of severe obesity (BMI 35.0-35.9) with comorbidity (ICD-10 E66.01, Z68.35). The patient's severe obesity was monitored, evaluated, addressed and/or treated. This addendum to the medical record is made on 02/28/2017.   weakness

## 2020-01-30 NOTE — H&P ADULT - NSTOBACCOSCREENHP_GEN_A_CS
Left detailed message that pt can be seen at either [facility] or [facility], whatever is most convenient for pt.   
Patient has follow up appointment on 03/12 at [facility]. She is wondering if she needs to be seen at the [place] office instead? She thought Dr. Barbosa had mentioned she needs to go to [place]. Please advise.  
No

## 2020-02-13 ENCOUNTER — FORM ENCOUNTER (OUTPATIENT)
Age: 40
End: 2020-02-13

## 2020-02-14 ENCOUNTER — APPOINTMENT (OUTPATIENT)
Dept: ENDOVASCULAR SURGERY | Facility: CLINIC | Age: 40
End: 2020-02-14
Payer: MEDICARE

## 2020-02-14 VITALS
BODY MASS INDEX: 36.45 KG/M2 | DIASTOLIC BLOOD PRESSURE: 85 MMHG | SYSTOLIC BLOOD PRESSURE: 161 MMHG | TEMPERATURE: 98.7 F | HEART RATE: 86 BPM | WEIGHT: 315 LBS | RESPIRATION RATE: 18 BRPM | OXYGEN SATURATION: 98 % | HEIGHT: 78 IN

## 2020-02-14 PROCEDURE — 36901Z: CUSTOM

## 2020-02-14 NOTE — HISTORY OF PRESENT ILLNESS
[] : left brachiocephalic fistula [FreeTextEntry1] : 2016  in [place] [FreeTextEntry4] : today [FreeTextEntry5] : 4 am [FreeTextEntry6] : Dr. Jimenez

## 2020-02-14 NOTE — PAST MEDICAL HISTORY
[Increasing age ( >40 years old)] : Increasing age ( >40 years old) [Altered mobility] : Altered mobility [Obesity: BMI >25] : Obesity: BMI >25 [No therapy indicated for cases scheduled for less than one hour] : No therapy indicated for cases scheduled for less than one hour. [FreeTextEntry1] : Malignant Hyperthermia Screening Tool and Risk of Bleeding Assessment\par \par Mr. PRESTON JOHNSON denies family history of unexpected death following Anesthesia or Exercise.\par Denies Family history of Malignant Hyperthermia, Muscle or Neuromuscular disorder and High Temperature following exercise.\par \par Mr. PRESTON JOHNSON denies history of Muscle Spasm, Dark or Chocolate - Colored urine and Unanticipated fever immediately following anesthesia or serious exercise. \par Mr. JOHNSON also denies bleeding tendencies/ Risks of Bleeding.\par

## 2020-02-14 NOTE — H&P ADULT - PROBLEM/PLAN-5
Patient seen and examined and agree with the above documentation with the following additions:   Patient is in active dying process, more hypotensive today. will change hydralazine to PRN  Required PRN over last 24 hours. Last BM 2/12. Induration and erythema improved on doxycycline.   Lengthy discussion with family today regarding dying process and PCU plan of care. Emotional support provided  SW follow up today, see care coordination note. Rest of management as above. DISPLAY PLAN FREE TEXT

## 2020-02-16 ENCOUNTER — EMERGENCY (EMERGENCY)
Facility: HOSPITAL | Age: 40
LOS: 1 days | Discharge: HOME | End: 2020-02-16
Attending: EMERGENCY MEDICINE
Payer: MEDICARE

## 2020-02-16 VITALS
OXYGEN SATURATION: 97 % | HEIGHT: 78 IN | TEMPERATURE: 98 F | SYSTOLIC BLOOD PRESSURE: 161 MMHG | RESPIRATION RATE: 16 BRPM | HEART RATE: 83 BPM | DIASTOLIC BLOOD PRESSURE: 88 MMHG | WEIGHT: 315 LBS

## 2020-02-16 VITALS
TEMPERATURE: 98 F | DIASTOLIC BLOOD PRESSURE: 79 MMHG | SYSTOLIC BLOOD PRESSURE: 160 MMHG | RESPIRATION RATE: 18 BRPM | OXYGEN SATURATION: 98 % | HEART RATE: 84 BPM

## 2020-02-16 DIAGNOSIS — Z98.890 OTHER SPECIFIED POSTPROCEDURAL STATES: Chronic | ICD-10-CM

## 2020-02-16 PROCEDURE — 99283 EMERGENCY DEPT VISIT LOW MDM: CPT

## 2020-02-16 RX ORDER — SODIUM CHLORIDE 9 MG/ML
1000 INJECTION INTRAMUSCULAR; INTRAVENOUS; SUBCUTANEOUS ONCE
Refills: 0 | Status: DISCONTINUED | OUTPATIENT
Start: 2020-02-16 | End: 2020-02-16

## 2020-02-16 RX ORDER — OXYCODONE AND ACETAMINOPHEN 5; 325 MG/1; MG/1
1 TABLET ORAL ONCE
Refills: 0 | Status: DISCONTINUED | OUTPATIENT
Start: 2020-02-16 | End: 2020-02-16

## 2020-02-16 RX ADMIN — Medication 300 MILLIGRAM(S): at 19:37

## 2020-02-16 RX ADMIN — OXYCODONE AND ACETAMINOPHEN 1 TABLET(S): 5; 325 TABLET ORAL at 19:37

## 2020-02-16 NOTE — ED PROVIDER NOTE - ABDOMINAL EXAM
soft/nontender.../no guarding, no rebound, left lower abdominal wall with approximately 3cm in diameter area of induration and tenderness, no erythema, no skin break, no skin color change, no discharge, no fluctuance, no crepitus, tenderness in area is very localized and well demarcated to tiny area

## 2020-02-16 NOTE — ED PROVIDER NOTE - PATIENT PORTAL LINK FT
You can access the FollowMyHealth Patient Portal offered by [facility] by registering at the following website: [email or web address]. By joining [employer]’s FollowMyHealth portal, you will also be able to view your health information using other applications (apps) compatible with our system.

## 2020-02-16 NOTE — ED PROVIDER NOTE - OBJECTIVE STATEMENT
38 y/o M pt with a PMHx of bipolar disorder, COPD, DM, ESRD (on dialysis MWF, last session on Friday), HTN, schizophrenia, morbid obesity, and a significant PSHx of hernia repair, presents to the ED with complaints of left lower abdomen mass with pain x3 days. Notes the mass is growing. States he had a mass in the past on his genital and had it scraped off. Patient notes he uses oxygen tank at home and needs it 24/7. Denies nausea, diarrhea, vomiting, fever, drainage or any other symptoms. Allergies: penicillin, ASA

## 2020-02-16 NOTE — ED PROVIDER NOTE - CLINICAL SUMMARY MEDICAL DECISION MAKING FREE TEXT BOX
Suspect superficial mild skin infection. Will start abdominal and prescribe PO antibiotics. No sign of abscess or deep infection or any other intra abdominal process. Will discharge with return precautions and PMD follow up.

## 2020-02-16 NOTE — ED ADULT NURSE NOTE - OBJECTIVE STATEMENT
presents to ED with c/o Ltsided abd pain for 3 days worsen today, denies N/V/D dysuria fever, reports chills. on hemodialysis MWF, last Tx 0n Friday 14/20.

## 2020-02-16 NOTE — ED PROVIDER NOTE - NSDCPRINTRESULTS_ED_ALL_ED
31yo male with PMH of ADD seen in holding room prior to surgery. Pt playing roller hockey on 9/11/18 and "rolled on right ankle and fell". Pt s/p ER visit at [facility] and diagnosed with fracture of right ankle. Pt with soft cast to right ankle. Pt denies pain. Pt denies right foot neuralgia and able to move toes. Pt electing for open treatment bimalleolar ankle fracture of right ankle on 9/13/18. Patient requests all Lab and Radiology Results on their Discharge Instructions

## 2020-02-19 ENCOUNTER — APPOINTMENT (OUTPATIENT)
Dept: ENDOVASCULAR SURGERY | Facility: CLINIC | Age: 40
End: 2020-02-19
Payer: MEDICARE

## 2020-02-19 PROCEDURE — 99213 OFFICE O/P EST LOW 20 MIN: CPT

## 2020-02-20 NOTE — ASSESSMENT
[FreeTextEntry1] : Patient with renal failure on hemodialysis with left brachiocephalic fistula. Patient with severe venous hypertension. No significant stenosis noted on duplex. Suspect the tortuosity of the vein at the level of the shoulder causing the venous hypertension. Scheduled for fistulogram and stent placement across the tortuous area.

## 2020-02-20 NOTE — REASON FOR VISIT
[Follow-Up Visit] : a follow-up visit for [High Venous Pressure] : high venous pressure [Aneurysm] : aneurysm

## 2020-02-20 NOTE — PHYSICAL EXAM
[Aneurysm] : aneurysm [Pulsatile Thrill] : pulsatile thrill [Hand well perfused] : hand well perfused [Normal] : coordination grossly intact, no focal deficits

## 2020-02-25 ENCOUNTER — APPOINTMENT (OUTPATIENT)
Dept: ENDOVASCULAR SURGERY | Facility: CLINIC | Age: 40
End: 2020-02-25
Payer: MEDICARE

## 2020-02-25 VITALS
BODY MASS INDEX: 36.45 KG/M2 | SYSTOLIC BLOOD PRESSURE: 179 MMHG | HEART RATE: 72 BPM | DIASTOLIC BLOOD PRESSURE: 99 MMHG | TEMPERATURE: 97.7 F | WEIGHT: 315 LBS | HEIGHT: 78 IN | OXYGEN SATURATION: 100 % | RESPIRATION RATE: 15 BRPM

## 2020-02-25 PROCEDURE — 36903Z: CUSTOM

## 2020-02-25 PROCEDURE — 36907Z: CUSTOM | Mod: 59

## 2020-02-25 PROCEDURE — 99212 OFFICE O/P EST SF 10 MIN: CPT | Mod: 25

## 2020-02-25 NOTE — PROCEDURE
[Resume diet] : resume diet [Site check for bleeding/hematoma/thrill/bruit] : Site check for bleeding/hematoma/thrill/bruit [Vital signs on admission the q 15 mins x2] : Vital signs on admission the q 15 mins x2 [Other: _____] : [unfilled] [FreeTextEntry1] : left arm fistula fistulogram/angioplasty/stent

## 2020-02-25 NOTE — PAST MEDICAL HISTORY
[Altered mobility] : Altered mobility [Increasing age ( >40 years old)] : Increasing age ( >40 years old) [Obesity: BMI >25] : Obesity: BMI >25 [No therapy indicated for cases scheduled for less than one hour] : No therapy indicated for cases scheduled for less than one hour. [FreeTextEntry1] : Malignant Hyperthermia Screening Tool and Risk of Bleeding Assessment\par \par Mr. PRESTON JOHNSON denies family history of unexpected death following Anesthesia or Exercise.\par Denies Family history of Malignant Hyperthermia, Muscle or Neuromuscular disorder and High Temperature following exercise.\par \par Mr. PRESTON JOHNSON denies history of Muscle Spasm, Dark or Chocolate - Colored urine and Unanticipated fever immediately following anesthesia or serious exercise. \par Mr. JOHNSON also denies bleeding tendencies/ Risks of Bleeding.\par

## 2020-02-25 NOTE — HISTORY OF PRESENT ILLNESS
[] : left brachiocephalic fistula [FreeTextEntry1] : 2016  in [place] [FreeTextEntry5] : yesterday 9 pm [FreeTextEntry4] : yesterday  [FreeTextEntry6] : Dr. Jimenez

## 2020-02-28 ENCOUNTER — EMERGENCY (EMERGENCY)
Facility: HOSPITAL | Age: 40
LOS: 1 days | Discharge: HOME | End: 2020-02-28
Attending: EMERGENCY MEDICINE
Payer: MEDICARE

## 2020-02-28 VITALS
HEART RATE: 70 BPM | TEMPERATURE: 98 F | OXYGEN SATURATION: 98 % | DIASTOLIC BLOOD PRESSURE: 81 MMHG | SYSTOLIC BLOOD PRESSURE: 150 MMHG | RESPIRATION RATE: 18 BRPM

## 2020-02-28 VITALS
HEIGHT: 78 IN | OXYGEN SATURATION: 96 % | HEART RATE: 80 BPM | SYSTOLIC BLOOD PRESSURE: 118 MMHG | DIASTOLIC BLOOD PRESSURE: 80 MMHG | RESPIRATION RATE: 18 BRPM | WEIGHT: 315 LBS | TEMPERATURE: 98 F

## 2020-02-28 DIAGNOSIS — Z98.890 OTHER SPECIFIED POSTPROCEDURAL STATES: Chronic | ICD-10-CM

## 2020-02-28 LAB
ANION GAP SERPL CALC-SCNC: 8 MMOL/L — SIGNIFICANT CHANGE UP (ref 5–17)
BASOPHILS # BLD AUTO: 0.03 K/UL — SIGNIFICANT CHANGE UP (ref 0–0.2)
BASOPHILS NFR BLD AUTO: 0.4 % — SIGNIFICANT CHANGE UP (ref 0–2)
BUN SERPL-MCNC: 20 MG/DL — HIGH (ref 7–18)
CALCIUM SERPL-MCNC: 9.7 MG/DL — SIGNIFICANT CHANGE UP (ref 8.4–10.5)
CHLORIDE SERPL-SCNC: 97 MMOL/L — SIGNIFICANT CHANGE UP (ref 96–108)
CO2 SERPL-SCNC: 32 MMOL/L — HIGH (ref 22–31)
CREAT SERPL-MCNC: 3.44 MG/DL — HIGH (ref 0.5–1.3)
EOSINOPHIL # BLD AUTO: 0.31 K/UL — SIGNIFICANT CHANGE UP (ref 0–0.5)
EOSINOPHIL NFR BLD AUTO: 4.3 % — SIGNIFICANT CHANGE UP (ref 0–6)
GLUCOSE SERPL-MCNC: 108 MG/DL — HIGH (ref 70–99)
HCT VFR BLD CALC: 40.3 % — SIGNIFICANT CHANGE UP (ref 39–50)
HGB BLD-MCNC: 11.9 G/DL — LOW (ref 13–17)
IMM GRANULOCYTES NFR BLD AUTO: 0.3 % — SIGNIFICANT CHANGE UP (ref 0–1.5)
LYMPHOCYTES # BLD AUTO: 0.83 K/UL — LOW (ref 1–3.3)
LYMPHOCYTES # BLD AUTO: 11.6 % — LOW (ref 13–44)
MCHC RBC-ENTMCNC: 28.5 PG — SIGNIFICANT CHANGE UP (ref 27–34)
MCHC RBC-ENTMCNC: 29.5 GM/DL — LOW (ref 32–36)
MCV RBC AUTO: 96.6 FL — SIGNIFICANT CHANGE UP (ref 80–100)
MONOCYTES # BLD AUTO: 0.91 K/UL — HIGH (ref 0–0.9)
MONOCYTES NFR BLD AUTO: 12.7 % — SIGNIFICANT CHANGE UP (ref 2–14)
NEUTROPHILS # BLD AUTO: 5.04 K/UL — SIGNIFICANT CHANGE UP (ref 1.8–7.4)
NEUTROPHILS NFR BLD AUTO: 70.7 % — SIGNIFICANT CHANGE UP (ref 43–77)
NRBC # BLD: 0 /100 WBCS — SIGNIFICANT CHANGE UP (ref 0–0)
PLATELET # BLD AUTO: 308 K/UL — SIGNIFICANT CHANGE UP (ref 150–400)
POTASSIUM SERPL-MCNC: 4 MMOL/L — SIGNIFICANT CHANGE UP (ref 3.5–5.3)
POTASSIUM SERPL-SCNC: 4 MMOL/L — SIGNIFICANT CHANGE UP (ref 3.5–5.3)
RBC # BLD: 4.17 M/UL — LOW (ref 4.2–5.8)
RBC # FLD: 15.9 % — HIGH (ref 10.3–14.5)
SODIUM SERPL-SCNC: 137 MMOL/L — SIGNIFICANT CHANGE UP (ref 135–145)
WBC # BLD: 7.14 K/UL — SIGNIFICANT CHANGE UP (ref 3.8–10.5)
WBC # FLD AUTO: 7.14 K/UL — SIGNIFICANT CHANGE UP (ref 3.8–10.5)

## 2020-02-28 PROCEDURE — 96374 THER/PROPH/DIAG INJ IV PUSH: CPT

## 2020-02-28 PROCEDURE — 85027 COMPLETE CBC AUTOMATED: CPT

## 2020-02-28 PROCEDURE — 80048 BASIC METABOLIC PNL TOTAL CA: CPT

## 2020-02-28 PROCEDURE — 93971 EXTREMITY STUDY: CPT | Mod: 26,LT

## 2020-02-28 PROCEDURE — 93971 EXTREMITY STUDY: CPT

## 2020-02-28 PROCEDURE — 99284 EMERGENCY DEPT VISIT MOD MDM: CPT

## 2020-02-28 PROCEDURE — 36415 COLL VENOUS BLD VENIPUNCTURE: CPT

## 2020-02-28 PROCEDURE — 99284 EMERGENCY DEPT VISIT MOD MDM: CPT | Mod: 25

## 2020-02-28 RX ORDER — MORPHINE SULFATE 50 MG/1
6 CAPSULE, EXTENDED RELEASE ORAL ONCE
Refills: 0 | Status: DISCONTINUED | OUTPATIENT
Start: 2020-02-28 | End: 2020-02-28

## 2020-02-28 RX ORDER — VANCOMYCIN HCL 1 G
1500 VIAL (EA) INTRAVENOUS ONCE
Refills: 0 | Status: COMPLETED | OUTPATIENT
Start: 2020-02-28 | End: 2020-02-28

## 2020-02-28 RX ORDER — OXYCODONE AND ACETAMINOPHEN 5; 325 MG/1; MG/1
1 TABLET ORAL ONCE
Refills: 0 | Status: DISCONTINUED | OUTPATIENT
Start: 2020-02-28 | End: 2020-02-28

## 2020-02-28 RX ADMIN — OXYCODONE AND ACETAMINOPHEN 1 TABLET(S): 5; 325 TABLET ORAL at 19:11

## 2020-02-28 RX ADMIN — OXYCODONE AND ACETAMINOPHEN 1 TABLET(S): 5; 325 TABLET ORAL at 15:15

## 2020-02-28 RX ADMIN — Medication 300 MILLIGRAM(S): at 17:24

## 2020-02-28 RX ADMIN — OXYCODONE AND ACETAMINOPHEN 1 TABLET(S): 5; 325 TABLET ORAL at 16:15

## 2020-02-28 RX ADMIN — OXYCODONE AND ACETAMINOPHEN 1 TABLET(S): 5; 325 TABLET ORAL at 12:54

## 2020-02-28 RX ADMIN — OXYCODONE AND ACETAMINOPHEN 1 TABLET(S): 5; 325 TABLET ORAL at 13:50

## 2020-02-28 NOTE — ED ADULT NURSE NOTE - NSIMPLEMENTINTERV_GEN_ALL_ED
Implemented All Universal Safety Interventions:  [place] to call system. Call bell, personal items and telephone within reach. Instruct patient to call for assistance. Room bathroom lighting operational. Non-slip footwear when patient is off stretcher. Physically safe environment: no spills, clutter or unnecessary equipment. Stretcher in lowest position, wheels locked, appropriate side rails in place.

## 2020-02-28 NOTE — ED PROVIDER NOTE - PATIENT PORTAL LINK FT
You can access the FollowMyHealth Patient Portal offered by [facility] by registering at the following website: [email or web address]. By joining [employer]’s FollowMyHealth portal, you will also be able to view your health information using other applications (apps) compatible with our system.

## 2020-02-28 NOTE — ED PROVIDER NOTE - OBJECTIVE STATEMENT
patient reports severe pain to left lower leg since last night. burning, from below knee to foot. No fever, cp, sob ap, n/v, focal weakness, paresthesias, injury. Patient is still on clindamycin from when he was seen here on 2/16 for an infection in his left groin, the sx of which have resolved. Patient had full dialysis today

## 2020-02-28 NOTE — ED ADULT NURSE NOTE - OBJECTIVE STATEMENT
pt is here for leg pain.  pt stated that "my leg is on fire".  denied trama or injury, denied fever or chills, no distress noted at this time.

## 2020-02-28 NOTE — ED PROVIDER NOTE - PROGRESS NOTE DETAILS
No DVT. Concerned for possible cellulitis. Patient reports percocet did not help. Will order more pain medicine. Spoke with Dr Loomis who came to see and examine patient. Patient well known to her from multiple prior admissions. She is doubtful that patient has cellulitis, but if it is present, it is very mild. Recommended one dose vancomycin 1.5Gm IV and then discharge. I spoke to Dr. La who agrees with plan and will continue vanco in dialysis if needed. Spoke with Dr Loomis who came to see and examine patient. Patient well known to her from multiple prior admissions. She is doubtful that patient has cellulitis, but if it is present, it is very mild. Recommended one dose vancomycin 1.5Gm IV and then discharge. I spoke to Dr. La who agrees with plan and will continue vanco in dialysis if needed. I mentioned that patient has Complex Care note recommended SW evaluation. Dr. La states there are social workers at the dialysis center who are already involved with patient. Patient is agreeable with plan. Patient signed out to Dr. Prater. Plan is to d/c after antibiotics.

## 2020-02-28 NOTE — ED PROVIDER NOTE - NSFOLLOWUPINSTRUCTIONS_ED_ALL_ED_FT
Cellulitis    WHAT YOU NEED TO KNOW:    Cellulitis is a skin infection caused by bacteria. Cellulitis may go away on its own or you may need treatment. Your healthcare provider may draw a Pala around the outside edges of your cellulitis. If your cellulitis spreads, your healthcare provider will see it outside of the Pala. Cellulitis          DISCHARGE INSTRUCTIONS:    Call 911 if:     You have sudden trouble breathing or chest pain.        Return to the emergency department if:     Your wound gets larger and more painful.       You feel a crackling under your skin when you touch it.      You have purple dots or bumps on your skin, or you see bleeding under your skin.      You have new swelling and pain in your legs.      The red, warm, swollen area gets larger.      You see red streaks coming from the infected area.    Contact your healthcare provider if:     You have a fever.      Your fever or pain does not go away or gets worse.      The area does not get smaller after 2 days of antibiotics.      Your skin is flaking or peeling off.      You have questions or concerns about your condition or care.    Medicines:     Antibiotics help treat the bacterial infection.       NSAIDs, such as ibuprofen, help decrease swelling, pain, and fever. NSAIDs can cause stomach bleeding or kidney problems in certain people. If you take blood thinner medicine, always ask if NSAIDs are safe for you. Always read the medicine label and follow directions. Do not give these medicines to children under 6 months of age without direction from your child's healthcare provider.      Acetaminophen decreases pain and fever. It is available without a doctor's order. Ask how much to take and how often to take it. Follow directions. Read the labels of all other medicines you are using to see if they also contain acetaminophen, or ask your doctor or pharmacist. Acetaminophen can cause liver damage if not taken correctly. Do not use more than 4 grams (4,000 milligrams) total of acetaminophen in one day.       Take your medicine as directed. Contact your healthcare provider if you think your medicine is not helping or if you have side effects. Tell him or her if you are allergic to any medicine. Keep a list of the medicines, vitamins, and herbs you take. Include the amounts, and when and why you take them. Bring the list or the pill bottles to follow-up visits. Carry your medicine list with you in case of an emergency.    Self-care:     Elevate the area above the level of your heart as often as you can. This will help decrease swelling and pain. Prop the area on pillows or blankets to keep it elevated comfortably.       Clean the area daily until the wound scabs over. Gently wash the area with soap and water. Pat dry. Use dressings as directed.       Place cool or warm, wet cloths on the area as directed. Use clean cloths and clean water. Leave it on the area until the cloth is room temperature. Pat the area dry with a clean, dry cloth. The cloths may help decrease pain.     Prevent cellulitis:     Do not scratch bug bites or areas of injury. You increase your risk for cellulitis by scratching these areas.       Do not share personal items, such as towels, clothing, and razors.       Clean exercise equipment with germ-killing  before and after you use it.      Wash your hands often. Use soap and water. Wash your hands after you use the bathroom, change a child's diapers, or sneeze. Wash your hands before you prepare or eat food. Use lotion to prevent dry, cracked skin. Handwashing           Wear pressure stockings as directed. You may be told to wear the stockings if you have peripheral edema. The stockings improve blood flow and decrease swelling.      Treat athlete’s foot. This can help prevent the spread of a bacterial skin infection.    Follow up with your healthcare provider within 3 days, or as directed: Your healthcare provider will check if your cellulitis is getting better. You may need different medicine. Write down your questions so you remember to ask them during your visits.

## 2020-02-28 NOTE — ED PROVIDER NOTE - MUSCULOSKELETAL MINIMAL EXAM
mild warmth and moderate tenderness of left calf and foot diffusely. slight erythema to calf. no erythema to foot.

## 2020-02-29 NOTE — ED PROVIDER NOTE - CARE PLAN
Principal Discharge DX:	Leg pain, bilateral  Secondary Diagnosis:	Edema steady/difficulty walking heel-to-toe

## 2020-03-11 ENCOUNTER — INPATIENT (INPATIENT)
Facility: HOSPITAL | Age: 40
LOS: 2 days | Discharge: HOME | DRG: 177 | End: 2020-03-14
Attending: HOSPITALIST | Admitting: HOSPITALIST
Payer: MEDICARE

## 2020-03-11 VITALS
OXYGEN SATURATION: 100 % | HEART RATE: 74 BPM | TEMPERATURE: 98 F | DIASTOLIC BLOOD PRESSURE: 82 MMHG | SYSTOLIC BLOOD PRESSURE: 150 MMHG | WEIGHT: 315 LBS | HEIGHT: 78 IN | RESPIRATION RATE: 19 BRPM

## 2020-03-11 DIAGNOSIS — E66.01 MORBID (SEVERE) OBESITY DUE TO EXCESS CALORIES: ICD-10-CM

## 2020-03-11 DIAGNOSIS — Z98.890 OTHER SPECIFIED POSTPROCEDURAL STATES: Chronic | ICD-10-CM

## 2020-03-11 DIAGNOSIS — J18.9 PNEUMONIA, UNSPECIFIED ORGANISM: ICD-10-CM

## 2020-03-11 DIAGNOSIS — I10 ESSENTIAL (PRIMARY) HYPERTENSION: ICD-10-CM

## 2020-03-11 DIAGNOSIS — F31.9 BIPOLAR DISORDER, UNSPECIFIED: ICD-10-CM

## 2020-03-11 DIAGNOSIS — N18.6 END STAGE RENAL DISEASE: ICD-10-CM

## 2020-03-11 DIAGNOSIS — J44.9 CHRONIC OBSTRUCTIVE PULMONARY DISEASE, UNSPECIFIED: ICD-10-CM

## 2020-03-11 DIAGNOSIS — R07.89 OTHER CHEST PAIN: ICD-10-CM

## 2020-03-11 DIAGNOSIS — E11.9 TYPE 2 DIABETES MELLITUS WITHOUT COMPLICATIONS: ICD-10-CM

## 2020-03-11 DIAGNOSIS — Z29.9 ENCOUNTER FOR PROPHYLACTIC MEASURES, UNSPECIFIED: ICD-10-CM

## 2020-03-11 LAB
ALBUMIN SERPL ELPH-MCNC: 3.5 G/DL — SIGNIFICANT CHANGE UP (ref 3.5–5)
ALP SERPL-CCNC: 169 U/L — HIGH (ref 40–120)
ALT FLD-CCNC: 20 U/L DA — SIGNIFICANT CHANGE UP (ref 10–60)
ANION GAP SERPL CALC-SCNC: 10 MMOL/L — SIGNIFICANT CHANGE UP (ref 5–17)
AST SERPL-CCNC: 37 U/L — SIGNIFICANT CHANGE UP (ref 10–40)
BILIRUB SERPL-MCNC: 0.6 MG/DL — SIGNIFICANT CHANGE UP (ref 0.2–1.2)
BUN SERPL-MCNC: 46 MG/DL — HIGH (ref 7–18)
CALCIUM SERPL-MCNC: 11.5 MG/DL — HIGH (ref 8.4–10.5)
CHLORIDE SERPL-SCNC: 95 MMOL/L — LOW (ref 96–108)
CK MB BLD-MCNC: 3.3 % — SIGNIFICANT CHANGE UP (ref 0–3.5)
CK MB CFR SERPL CALC: 1.2 NG/ML — SIGNIFICANT CHANGE UP (ref 0–3.6)
CK SERPL-CCNC: 36 U/L — SIGNIFICANT CHANGE UP (ref 35–232)
CO2 SERPL-SCNC: 27 MMOL/L — SIGNIFICANT CHANGE UP (ref 22–31)
CREAT SERPL-MCNC: 6.5 MG/DL — HIGH (ref 0.5–1.3)
FLU A RESULT: SIGNIFICANT CHANGE UP
FLU A RESULT: SIGNIFICANT CHANGE UP
FLUAV AG NPH QL: SIGNIFICANT CHANGE UP
FLUBV AG NPH QL: SIGNIFICANT CHANGE UP
GLUCOSE SERPL-MCNC: 118 MG/DL — HIGH (ref 70–99)
HCT VFR BLD CALC: 42.8 % — SIGNIFICANT CHANGE UP (ref 39–50)
HGB BLD-MCNC: 13.1 G/DL — SIGNIFICANT CHANGE UP (ref 13–17)
LIDOCAIN IGE QN: 335 U/L — SIGNIFICANT CHANGE UP (ref 73–393)
MCHC RBC-ENTMCNC: 28.2 PG — SIGNIFICANT CHANGE UP (ref 27–34)
MCHC RBC-ENTMCNC: 30.6 GM/DL — LOW (ref 32–36)
MCV RBC AUTO: 92.2 FL — SIGNIFICANT CHANGE UP (ref 80–100)
NRBC # BLD: 0 /100 WBCS — SIGNIFICANT CHANGE UP (ref 0–0)
PLATELET # BLD AUTO: 277 K/UL — SIGNIFICANT CHANGE UP (ref 150–400)
POTASSIUM SERPL-MCNC: 4.8 MMOL/L — SIGNIFICANT CHANGE UP (ref 3.5–5.3)
POTASSIUM SERPL-SCNC: 4.8 MMOL/L — SIGNIFICANT CHANGE UP (ref 3.5–5.3)
PROT SERPL-MCNC: 9.3 G/DL — HIGH (ref 6–8.3)
RBC # BLD: 4.64 M/UL — SIGNIFICANT CHANGE UP (ref 4.2–5.8)
RBC # FLD: 15.2 % — HIGH (ref 10.3–14.5)
RSV RESULT: SIGNIFICANT CHANGE UP
RSV RNA RESP QL NAA+PROBE: SIGNIFICANT CHANGE UP
SODIUM SERPL-SCNC: 132 MMOL/L — LOW (ref 135–145)
TROPONIN I SERPL-MCNC: <0.015 NG/ML — SIGNIFICANT CHANGE UP (ref 0–0.04)
TROPONIN I SERPL-MCNC: <0.015 NG/ML — SIGNIFICANT CHANGE UP (ref 0–0.04)
WBC # BLD: 9.03 K/UL — SIGNIFICANT CHANGE UP (ref 3.8–10.5)
WBC # FLD AUTO: 9.03 K/UL — SIGNIFICANT CHANGE UP (ref 3.8–10.5)

## 2020-03-11 PROCEDURE — 99285 EMERGENCY DEPT VISIT HI MDM: CPT

## 2020-03-11 PROCEDURE — 71045 X-RAY EXAM CHEST 1 VIEW: CPT | Mod: 26

## 2020-03-11 PROCEDURE — 71250 CT THORAX DX C-: CPT | Mod: 26

## 2020-03-11 PROCEDURE — 99223 1ST HOSP IP/OBS HIGH 75: CPT | Mod: GC,AI

## 2020-03-11 RX ORDER — METHOCARBAMOL 500 MG/1
1 TABLET, FILM COATED ORAL
Qty: 10 | Refills: 0

## 2020-03-11 RX ORDER — CEFTRIAXONE 500 MG/1
1000 INJECTION, POWDER, FOR SOLUTION INTRAMUSCULAR; INTRAVENOUS EVERY 24 HOURS
Refills: 0 | Status: DISCONTINUED | OUTPATIENT
Start: 2020-03-12 | End: 2020-03-14

## 2020-03-11 RX ORDER — FERROUS SULFATE 325(65) MG
325 TABLET ORAL DAILY
Refills: 0 | Status: DISCONTINUED | OUTPATIENT
Start: 2020-03-11 | End: 2020-03-13

## 2020-03-11 RX ORDER — AZITHROMYCIN 500 MG/1
500 TABLET, FILM COATED ORAL EVERY 24 HOURS
Refills: 0 | Status: DISCONTINUED | OUTPATIENT
Start: 2020-03-12 | End: 2020-03-14

## 2020-03-11 RX ORDER — CEFTRIAXONE 500 MG/1
INJECTION, POWDER, FOR SOLUTION INTRAMUSCULAR; INTRAVENOUS
Refills: 0 | Status: DISCONTINUED | OUTPATIENT
Start: 2020-03-11 | End: 2020-03-14

## 2020-03-11 RX ORDER — CLOPIDOGREL BISULFATE 75 MG/1
75 TABLET, FILM COATED ORAL DAILY
Refills: 0 | Status: DISCONTINUED | OUTPATIENT
Start: 2020-03-11 | End: 2020-03-14

## 2020-03-11 RX ORDER — TRAMADOL HYDROCHLORIDE 50 MG/1
25 TABLET ORAL EVERY 8 HOURS
Refills: 0 | Status: DISCONTINUED | OUTPATIENT
Start: 2020-03-11 | End: 2020-03-13

## 2020-03-11 RX ORDER — PANTOPRAZOLE SODIUM 20 MG/1
40 TABLET, DELAYED RELEASE ORAL
Refills: 0 | Status: DISCONTINUED | OUTPATIENT
Start: 2020-03-11 | End: 2020-03-14

## 2020-03-11 RX ORDER — GABAPENTIN 400 MG/1
300 CAPSULE ORAL AT BEDTIME
Refills: 0 | Status: DISCONTINUED | OUTPATIENT
Start: 2020-03-11 | End: 2020-03-14

## 2020-03-11 RX ORDER — ALBUTEROL 90 UG/1
2 AEROSOL, METERED ORAL EVERY 6 HOURS
Refills: 0 | Status: DISCONTINUED | OUTPATIENT
Start: 2020-03-11 | End: 2020-03-14

## 2020-03-11 RX ORDER — INSULIN LISPRO 100/ML
VIAL (ML) SUBCUTANEOUS
Refills: 0 | Status: DISCONTINUED | OUTPATIENT
Start: 2020-03-11 | End: 2020-03-14

## 2020-03-11 RX ORDER — ACETAMINOPHEN 500 MG
1000 TABLET ORAL ONCE
Refills: 0 | Status: COMPLETED | OUTPATIENT
Start: 2020-03-11 | End: 2020-03-11

## 2020-03-11 RX ORDER — CEFTRIAXONE 500 MG/1
1000 INJECTION, POWDER, FOR SOLUTION INTRAMUSCULAR; INTRAVENOUS ONCE
Refills: 0 | Status: COMPLETED | OUTPATIENT
Start: 2020-03-11 | End: 2020-03-11

## 2020-03-11 RX ORDER — HYDRALAZINE HCL 50 MG
25 TABLET ORAL THREE TIMES A DAY
Refills: 0 | Status: DISCONTINUED | OUTPATIENT
Start: 2020-03-11 | End: 2020-03-14

## 2020-03-11 RX ORDER — RISPERIDONE 4 MG/1
1 TABLET ORAL EVERY 12 HOURS
Refills: 0 | Status: DISCONTINUED | OUTPATIENT
Start: 2020-03-11 | End: 2020-03-14

## 2020-03-11 RX ORDER — MIRTAZAPINE 45 MG/1
1 TABLET, ORALLY DISINTEGRATING ORAL
Qty: 0 | Refills: 0 | DISCHARGE

## 2020-03-11 RX ORDER — AMLODIPINE BESYLATE 2.5 MG/1
5 TABLET ORAL DAILY
Refills: 0 | Status: DISCONTINUED | OUTPATIENT
Start: 2020-03-11 | End: 2020-03-14

## 2020-03-11 RX ORDER — INSULIN LISPRO 100/ML
10 VIAL (ML) SUBCUTANEOUS
Refills: 0 | Status: DISCONTINUED | OUTPATIENT
Start: 2020-03-11 | End: 2020-03-12

## 2020-03-11 RX ORDER — INSULIN GLARGINE 100 [IU]/ML
20 INJECTION, SOLUTION SUBCUTANEOUS AT BEDTIME
Refills: 0 | Status: DISCONTINUED | OUTPATIENT
Start: 2020-03-11 | End: 2020-03-12

## 2020-03-11 RX ORDER — DEXTROSE 50 % IN WATER 50 %
25 SYRINGE (ML) INTRAVENOUS ONCE
Refills: 0 | Status: DISCONTINUED | OUTPATIENT
Start: 2020-03-11 | End: 2020-03-14

## 2020-03-11 RX ORDER — SIMVASTATIN 20 MG/1
40 TABLET, FILM COATED ORAL AT BEDTIME
Refills: 0 | Status: DISCONTINUED | OUTPATIENT
Start: 2020-03-11 | End: 2020-03-14

## 2020-03-11 RX ORDER — MORPHINE SULFATE 50 MG/1
1 CAPSULE, EXTENDED RELEASE ORAL ONCE
Refills: 0 | Status: DISCONTINUED | OUTPATIENT
Start: 2020-03-11 | End: 2020-03-11

## 2020-03-11 RX ORDER — MONTELUKAST 4 MG/1
10 TABLET, CHEWABLE ORAL DAILY
Refills: 0 | Status: DISCONTINUED | OUTPATIENT
Start: 2020-03-11 | End: 2020-03-14

## 2020-03-11 RX ORDER — AZITHROMYCIN 500 MG/1
TABLET, FILM COATED ORAL
Refills: 0 | Status: DISCONTINUED | OUTPATIENT
Start: 2020-03-11 | End: 2020-03-14

## 2020-03-11 RX ORDER — OLOPATADINE HYDROCHLORIDE 1 MG/ML
1 SOLUTION/ DROPS OPHTHALMIC
Qty: 0 | Refills: 0 | DISCHARGE

## 2020-03-11 RX ORDER — ACETAMINOPHEN 500 MG
650 TABLET ORAL EVERY 6 HOURS
Refills: 0 | Status: DISCONTINUED | OUTPATIENT
Start: 2020-03-11 | End: 2020-03-14

## 2020-03-11 RX ORDER — AZITHROMYCIN 500 MG/1
500 TABLET, FILM COATED ORAL ONCE
Refills: 0 | Status: COMPLETED | OUTPATIENT
Start: 2020-03-11 | End: 2020-03-11

## 2020-03-11 RX ORDER — HEPARIN SODIUM 5000 [USP'U]/ML
5000 INJECTION INTRAVENOUS; SUBCUTANEOUS EVERY 12 HOURS
Refills: 0 | Status: DISCONTINUED | OUTPATIENT
Start: 2020-03-11 | End: 2020-03-14

## 2020-03-11 RX ORDER — CYCLOSPORINE 0.5 MG/ML
1 EMULSION OPHTHALMIC
Qty: 0 | Refills: 0 | DISCHARGE

## 2020-03-11 RX ORDER — ONDANSETRON 8 MG/1
4 TABLET, FILM COATED ORAL ONCE
Refills: 0 | Status: COMPLETED | OUTPATIENT
Start: 2020-03-11 | End: 2020-03-11

## 2020-03-11 RX ORDER — SEVELAMER CARBONATE 2400 MG/1
800 POWDER, FOR SUSPENSION ORAL
Refills: 0 | Status: DISCONTINUED | OUTPATIENT
Start: 2020-03-11 | End: 2020-03-14

## 2020-03-11 RX ORDER — BUDESONIDE AND FORMOTEROL FUMARATE DIHYDRATE 160; 4.5 UG/1; UG/1
2 AEROSOL RESPIRATORY (INHALATION)
Refills: 0 | Status: DISCONTINUED | OUTPATIENT
Start: 2020-03-11 | End: 2020-03-14

## 2020-03-11 RX ADMIN — AZITHROMYCIN 255 MILLIGRAM(S): 500 TABLET, FILM COATED ORAL at 19:54

## 2020-03-11 RX ADMIN — Medication 1000 MILLIGRAM(S): at 11:10

## 2020-03-11 RX ADMIN — Medication 25 MILLIGRAM(S): at 22:08

## 2020-03-11 RX ADMIN — MORPHINE SULFATE 1 MILLIGRAM(S): 50 CAPSULE, EXTENDED RELEASE ORAL at 11:24

## 2020-03-11 RX ADMIN — SIMVASTATIN 40 MILLIGRAM(S): 20 TABLET, FILM COATED ORAL at 22:08

## 2020-03-11 RX ADMIN — SEVELAMER CARBONATE 800 MILLIGRAM(S): 2400 POWDER, FOR SUSPENSION ORAL at 17:47

## 2020-03-11 RX ADMIN — RISPERIDONE 1 MILLIGRAM(S): 4 TABLET ORAL at 17:47

## 2020-03-11 RX ADMIN — Medication 1000 MILLIGRAM(S): at 22:37

## 2020-03-11 RX ADMIN — MORPHINE SULFATE 1 MILLIGRAM(S): 50 CAPSULE, EXTENDED RELEASE ORAL at 13:53

## 2020-03-11 RX ADMIN — TRAMADOL HYDROCHLORIDE 25 MILLIGRAM(S): 50 TABLET ORAL at 19:54

## 2020-03-11 RX ADMIN — BUDESONIDE AND FORMOTEROL FUMARATE DIHYDRATE 2 PUFF(S): 160; 4.5 AEROSOL RESPIRATORY (INHALATION) at 22:08

## 2020-03-11 RX ADMIN — CEFTRIAXONE 100 MILLIGRAM(S): 500 INJECTION, POWDER, FOR SOLUTION INTRAMUSCULAR; INTRAVENOUS at 19:54

## 2020-03-11 RX ADMIN — ONDANSETRON 4 MILLIGRAM(S): 8 TABLET, FILM COATED ORAL at 21:34

## 2020-03-11 RX ADMIN — Medication 1000 MILLIGRAM(S): at 15:24

## 2020-03-11 RX ADMIN — MORPHINE SULFATE 1 MILLIGRAM(S): 50 CAPSULE, EXTENDED RELEASE ORAL at 11:44

## 2020-03-11 RX ADMIN — Medication 400 MILLIGRAM(S): at 10:24

## 2020-03-11 RX ADMIN — MORPHINE SULFATE 1 MILLIGRAM(S): 50 CAPSULE, EXTENDED RELEASE ORAL at 14:10

## 2020-03-11 RX ADMIN — GABAPENTIN 300 MILLIGRAM(S): 400 CAPSULE ORAL at 22:11

## 2020-03-11 RX ADMIN — Medication 400 MILLIGRAM(S): at 22:07

## 2020-03-11 RX ADMIN — HEPARIN SODIUM 5000 UNIT(S): 5000 INJECTION INTRAVENOUS; SUBCUTANEOUS at 17:48

## 2020-03-11 NOTE — H&P ADULT - PROBLEM SELECTOR PLAN 1
- CT chest: Left upper lobe airspace consolidation compatible with pneumonia in the appropriate clinical setting.  Wbc; WNL, Afeb, denies cough   - Will cover for CAP with Rocephin and Azithro   - Dr Jose consulted   - TB unlikely as he denies any exposure, denies any recent travel. - CT chest: Left upper lobe airspace consolidation compatible with pneumonia in the appropriate clinical setting.  Wbc; WNL, Afbrile, denies cough   - Will cover for CAP with Rocephin and Azithro   - Dr Jose consulted - Pulmonary  - TB unlikely as he denies any exposure, denies any recent travel.

## 2020-03-11 NOTE — H&P ADULT - NSHPSOURCEINFORD_GEN_ALL_CORE
Patient/Chart(s) Quality 226: Preventive Care And Screening: Tobacco Use: Screening And Cessation Intervention: Patient screened for tobacco and is an ex-smoker Quality 431: Preventive Care And Screening: Unhealthy Alcohol Use - Screening: Patient screened for unhealthy alcohol use using a single question and scores 2 or greater episodes per year and brief intervention occurred Quality 110: Preventive Care And Screening: Influenza Immunization: Influenza Immunization Ordered or Recommended, but not Administered Quality 131: Pain Assessment And Follow-Up: Pain assessment using a standardized tool is documented as negative, no follow-up plan required Detail Level: Zone

## 2020-03-11 NOTE — ED ADULT NURSE NOTE - ED STAT RN HANDOFF DETAILS
Report given to DANIEL Ochoa. Pt resting in bed, no acute distress noted, denies chest pain, no shortness of breath indicated. Safety maintained.

## 2020-03-11 NOTE — H&P ADULT - PROBLEM SELECTOR PLAN 2
- p/w Chest pain as above, Trop: neg   - EKG: no acute changes   most likely muscle pain. Will not proceed with cardio work up   - Pain management with Tylenol - p/w Chest pain as above, Trop: neg   - EKG: no acute changes   most likely muscle pain.   No need for additional cardiac tetsing at this time. Will monitor closelt and reassess if symptoms recur or progress   - Pain management with Tylenol

## 2020-03-11 NOTE — ED PROVIDER NOTE - NS ED ROS FT
CONSTITUTIONAL: No fever, weight loss, or fatigue  EYES: No eye pain, visual disturbances, or discharge  ENMT:  No difficulty hearing, tinnitus, vertigo; No sinus or throat pain  NECK: No pain or stiffness  BREASTS: No pain, masses, or nipple discharge  RESPIRATORY: + shortness of breath  CARDIOVASCULAR: + chest pain  GASTROINTESTINAL: No abdominal or epigastric pain. No nausea, vomiting, or hematemesis; No diarrhea or constipation. No melena or hematochezia.  GENITOURINARY: No dysuria, frequency, hematuria, or incontinence  NEUROLOGICAL: No headaches, memory loss, loss of strength, numbness, or tremors  SKIN: No itching, burning, rashes, or lesions   LYMPH NODES: No enlarged glands  ENDOCRINE: No heat or cold intolerance; No hair loss  MUSCULOSKELETAL: No joint pain or swelling; No muscle, back, or extremity pain  PSYCHIATRIC: No depression, anxiety, mood swings, or difficulty sleeping  HEME/LYMPH: No easy bruising, or bleeding gums

## 2020-03-11 NOTE — CONSULT NOTE ADULT - ASSESSMENT
Patient is a 39y Male with ESRD on HD TIW at [facility]. Presented to Dialysis this morning and complained of chest pain thus dialysis was stopped after 1 hour and transfered to hospital. In ED chest CT showed MOOSE airspace consolidation, admitted for IV antibiotics.  Denies any SOB or chest pain at the current time.  He has chronic SOB, bilateral leg edema from non-compliance with dialysis and salt/fluid restriction .   Renal consulted for ESRD.    # ESRD: HD in AM. on ABX for PNA  # Hypertension Controlled. resume outpt meds and UF as tolerated  # Anemia of CKD stable off SIOBHAN  # Lytes and acid base at goal  # chronic hypervolemia UF as tolerated.

## 2020-03-11 NOTE — ED ADULT TRIAGE NOTE - CHIEF COMPLAINT QUOTE
Patient BIBA from dialysis center with c/o chest pain and SOB Patient BIBA from dialysis center after 1 hour into dialysis  c/o chest pain and SOB

## 2020-03-11 NOTE — H&P ADULT - PROBLEM SELECTOR PLAN 3
- Home dose; Lantus 20 units and nova log 10 units pre meal   - c.w Lantus 20 units hs with premeal 10 units humalog with SS coverage   Follow A1c in AM - Home dose; Lantus 20 units and nova log 10 units pre meal   - c.w Lantus 20 units hs with pre meal 10 units Humalog with SS coverage   Follow A1c in AM

## 2020-03-11 NOTE — H&P ADULT - HISTORY OF PRESENT ILLNESS
38 y/o morbidly obese male from home PMH ESRD (on MWF HD via [facility] w/ Dr La), HTN, IDDM, Substance abuse, COPD (former smoker, on home oxygen 2L), Bipolar disorder, and Schizophrenia p/w atypical chest pain - occurred this AM during HD, s/p 2 hours (out of usual 5 hour), began on the R side as focal and sharp w/ radiation to L chest, unrelieved w/ rest, and persistent. Otherwise denied any F, changes in dyspnea, abdominal pain, NVDC, urinary changes, or any other complaints.    ------ 40 y/o morbidly obese male from home PMH ESRD (on MWF HD via LUE AVF at [facility] w/ Dr La), HTN, IDDM, Substance abuse in the past, COPD (former smoker, on home oxygen 2L), Bipolar disorder, and Schizophrenia p/w atypical chest pain - occurred this AM during HD, s/p 2 hours (out of usual 5 hour), began on the R side as focal and sharp w/ radiation to L chest, unrelieved w/ rest, and persistent. Otherwise denied any Fever, changes in dyspnea, abdominal pain, NVDC, urinary changes, or any other complaints. Denies any cough, shortness of breath. Presently denies any complain.

## 2020-03-11 NOTE — ED PROVIDER NOTE - PROGRESS NOTE DETAILS
Patient w/ persistent L sided chest discomfort, now endorses chills, still denies any cough - mild improvement w/ IV Tylenol and Morphine 1 mg, s/p CT chest concerning for L sided consolidation

## 2020-03-11 NOTE — CONSULT NOTE ADULT - SUBJECTIVE AND OBJECTIVE BOX
[facility] : Progress Note :: [phone], (office [phone]),   Dr La / Dr Hui / Dr Barber / Dr Villalobos / Dr Hang CASTELLANO / Dr Mehta / Dr Sanchez / Dr Alber dumont  _____________________________________________________________________________________________  Patient is a 39y Male with ESRD on HD TIW at [facility]. Presented to Dialysis this morning and complained of chest pain thus dialysis was stopped after 1 hour and transfered to hospital. In ED chest CT showed MOOSE airspace consolidation, admitted for IV antibiotics.  Denies any SOB or chest pain at the current time.  He has chronic SOB, bilateral leg edema from non-compliance with dialysis and salt/fluid restriction .   Renal consulted for ESRD.        PAST MEDICAL & SURGICAL HISTORY:  COPD (chronic obstructive pulmonary disease)  Migraine  HTN (hypertension)  DM (diabetes mellitus)  Bipolar disorder  Schizophrenia  ESRD on dialysis  Morbid obesity with BMI of 40.0-44.9, adult  H/O hernia repair    aspirin (Swelling)  fish (Unknown)  penicillins (Swelling)  shellfish (Unknown)    Home Medications Reviewed  Hospital Medications:   MEDICATIONS  (STANDING):  amLODIPine   Tablet 5 milliGRAM(s) Oral daily  budesonide 160 MICROgram(s)/formoterol 4.5 MICROgram(s) Inhaler 2 Puff(s) Inhalation two times a day  clopidogrel Tablet 75 milliGRAM(s) Oral daily  dextrose 50% Injectable 25 Gram(s) IV Push once  ferrous    sulfate 325 milliGRAM(s) Oral daily  gabapentin 300 milliGRAM(s) Oral at bedtime  heparin  Injectable 5000 Unit(s) SubCutaneous every 12 hours  hydrALAZINE 25 milliGRAM(s) Oral three times a day  insulin glargine Injectable (LANTUS) 20 Unit(s) SubCutaneous at bedtime  insulin lispro (HumaLOG) corrective regimen sliding scale   SubCutaneous Before meals and at bedtime  insulin lispro Injectable (HumaLOG) 10 Unit(s) SubCutaneous three times a day before meals  montelukast 10 milliGRAM(s) Oral daily  pantoprazole    Tablet 40 milliGRAM(s) Oral before breakfast  PARoxetine 20 milliGRAM(s) Oral daily  risperiDONE   Tablet 1 milliGRAM(s) Oral every 12 hours  sevelamer carbonate 800 milliGRAM(s) Oral three times a day with meals  simvastatin 40 milliGRAM(s) Oral at bedtime    SOCIAL HISTORY:  Denies ETOh,Smoking,   FAMILY HISTORY:  No pertinent family history in first degree relatives      VITALS:  T(F): 97.6 (03-11-20 @ 15:00), Max: 98.4 (03-11-20 @ 11:16)  HR: 68 (03-11-20 @ 15:00)  BP: 152/75 (03-11-20 @ 15:00)  RR: 18 (03-11-20 @ 15:00)  SpO2: 100% (03-11-20 @ 15:00)  Wt(kg): --    Height (cm): 200.66 (03-11 @ 07:36)  Weight (kg): 163.3 (03-11 @ 07:36)  BMI (kg/m2): 40.6 (03-11 @ 07:36)  BSA (m2): 2.93 (03-11 @ 07:36)    PHYSICAL EXAM:  Constitutional: NAD  HEENT: anicteric sclera, oropharynx clear.  Neck: No JVD  Respiratory: CTAB, no wheezes, rales or rhonchi  Cardiovascular: S1, S2, RRR  Gastrointestinal: BS+, soft, NT/ND  Extremities:  peripheral edema++  Neurological: A/O x 3, no focal deficits  : No CVA tenderness. No hernandez.   Skin: No rashes  Vascular Access: AVF with thrill and bruit+    LABS:  03-11    132<L>  |  95<L>  |  46<H>  ----------------------------<  118<H>  4.8   |  27  |  6.50<H>    Ca    11.5<H>      11 Mar 2020 09:37    TPro  9.3<H>  /  Alb  3.5  /  TBili  0.6  /  DBili      /  AST  37  /  ALT  20  /  AlkPhos  169<H>  03-11    Creatinine Trend: 6.50 <--                        13.1   9.03  )-----------( 277      ( 11 Mar 2020 09:37 )             42.8     Urine Studies:      RADIOLOGY & ADDITIONAL STUDIES:

## 2020-03-11 NOTE — ED PROVIDER NOTE - OBJECTIVE STATEMENT
38 y/o morbidly obese male from home PMH ESRD (on MWF HD via LUE AVF at [facility] w/ Dr La), HTN, IDDM, Substance abuse, COPD (former smoker, on home oxygen 2L), Bipolar disorder, and Schizophrenia p/w atypical chest pain - occurred this AM during HD, s/p 2 hours (out of usual 5 hour), began on the R side as focal and sharp w/ radiation to L chest, unrelieved w/ rest, and persistent. Otherwise denied any F, changes in dyspnea, abdominal pain, NVDC, urinary changes, or any other complaints.

## 2020-03-11 NOTE — ED PROVIDER NOTE - CLINICAL SUMMARY MEDICAL DECISION MAKING FREE TEXT BOX
40 y/o morbidly obese male from home PMH ESRD (on MWF HD via LUE AVF at [facility] w/ Dr La), HTN, IDDM, Substance abuse, COPD (former smoker, on home oxygen 2L), Bipolar disorder, and Schizophrenia p/w atypical chest pain less likely cardiac and more MSK- EKG, CXR, BMP/CBC, and Tylenol, then reassess.

## 2020-03-11 NOTE — H&P ADULT - ASSESSMENT
38 y/o morbidly obese male from home PMH ESRD (on MWF HD via LUE AVF at [facility] w/ Dr La), HTN, IDDM, Substance abuse in the past, COPD (former smoker, on home oxygen 2L), Bipolar disorder, and Schizophrenia p/w atypical chest pain - occurred this AM during HD, s/p 2 hours (out of usual 5 hour), began on the R side as focal and sharp w/ radiation to L chest, unrelieved w/ rest, and persistent. Otherwise denied any Fever, changes in dyspnea, abdominal pain, NVDC, urinary changes, or any other complaints. Denies any cough, shortness of breath. Presently denies any complain.     Admitted with PNA

## 2020-03-11 NOTE — ED ADULT NURSE NOTE - SUICIDE SCREENING QUESTION 2
“You can access the Follow[employer] Patient Portal, offered by [facility], by registering with the following website: [email or web address]” No

## 2020-03-11 NOTE — ED PROVIDER NOTE - ATTENDING CONTRIBUTION TO CARE
38 yo M h/o ESRD, IDDM, COPD on home 02, p/w left sided chest pain, SOB  onset during dialysis  no f/c/cough  diminshed BS left side  no resp distress  EKG unchanged  xray showing left sided infiltrate  confirmed by CT chest  admit for IV abx, dialysis

## 2020-03-11 NOTE — H&P ADULT - NSHPREVIEWOFSYSTEMS_GEN_ALL_CORE
REVIEW OF SYSTEMS:  CONSTITUTIONAL: No fever,   EYES: no acute visual disturbances  NECK: No pain or stiffness  RESPIRATORY: No cough; No shortness of breath  CARDIOVASCULAR: , no palpitations  GASTROINTESTINAL: No pain. No nausea or vomiting; No diarrhea   NEUROLOGICAL: No headache or numbness, no tremors  MUSCULOSKELETAL: No joint pain, no muscle pain  GENITOURINARY: no dysuria, no frequency, no hesitancy  PSYCHIATRY: no depression , no anxiety  ALL OTHER  ROS negative

## 2020-03-11 NOTE — H&P ADULT - PROBLEM SELECTOR PLAN 9
IMPROVE VTE Individual Risk Assessment  RISK                                                                Points  [  ] Previous VTE                                                  3  [  ] Thrombophilia                                               2  [  ] Lower limb paralysis                                      2        (unable to hold up >15 seconds)    [  ] Current Cancer                                              2         (within 6 months)  [x  ] Immobilization > 24 hrs                                1  [  ] ICU/CCU stay > 24 hours                              1  [  ] Age > 60                                                      1  IMPROVE VTE Score _1, Heparin  for DVT proph

## 2020-03-11 NOTE — H&P ADULT - ATTENDING COMMENTS
Patient was seen and examined by myself at about 5 pm 3/11/2020. Case was discussed with house staff in details. I have reviewed and agree with the plan as outlined above with edits where appropriate.    Vital Signs Last 24 Hrs  T(C): 36.2 (11 Mar 2020 21:05), Max: 36.9 (11 Mar 2020 11:16)  T(F): 97.2 (11 Mar 2020 21:05), Max: 98.4 (11 Mar 2020 11:16)  HR: 68 (11 Mar 2020 21:05) (66 - 77)  BP: 144/88 (11 Mar 2020 21:05) (144/88 - 153/95)  BP(mean): 95 (11 Mar 2020 15:00) (95 - 95)  RR: 17 (11 Mar 2020 21:05) (17 - 20)  SpO2: 100% (11 Mar 2020 21:05) (100% - 100%)                            13.1   9.03  )-----------( 277      ( 11 Mar 2020 09:37 )             42.8       03-11    132<L>  |  95<L>  |  46<H>  ----------------------------<  118<H>  4.8   |  27  |  6.50<H>    Ca    11.5<H>      11 Mar 2020 09:37    TPro  9.3<H>  /  Alb  3.5  /  TBili  0.6  /  DBili  x   /  AST  37  /  ALT  20  /  AlkPhos  169<H>  03-11      Amitted for chest pain; ESRD on HD 3 s=x weekly    plan as outlined above Patient was seen and examined by myself at about 5 pm 3/11/2020. Case was discussed with house staff in details. I have reviewed and agree with the plan as outlined above with edits where appropriate.    Vital Signs Last 24 Hrs  T(C): 36.2 (11 Mar 2020 21:05), Max: 36.9 (11 Mar 2020 11:16)  T(F): 97.2 (11 Mar 2020 21:05), Max: 98.4 (11 Mar 2020 11:16)  HR: 68 (11 Mar 2020 21:05) (66 - 77)  BP: 144/88 (11 Mar 2020 21:05) (144/88 - 153/95)  BP(mean): 95 (11 Mar 2020 15:00) (95 - 95)  RR: 17 (11 Mar 2020 21:05) (17 - 20)  SpO2: 100% (11 Mar 2020 21:05) (100% - 100%)                            13.1   9.03  )-----------( 277      ( 11 Mar 2020 09:37 )             42.8       03-11    132<L>  |  95<L>  |  46<H>  ----------------------------<  118<H>  4.8   |  27  |  6.50<H>    Ca    11.5<H>      11 Mar 2020 09:37    TPro  9.3<H>  /  Alb  3.5  /  TBili  0.6  /  DBili  x   /  AST  37  /  ALT  20  /  AlkPhos  169<H>  03-11    Impression and plan:   - Admitted for chest pain;  -  ESRD on HD 3 x weekly.  - Also with MOOSE infiltrate possible PNA-  treat for PNA.  - Negative cardiac enzymes- discontinue telemetry  Other plan as above.      plan as outlined above

## 2020-03-11 NOTE — H&P ADULT - NSHPLABSRESULTS_GEN_ALL_CORE
13.1   9.03  )-----------( 277      ( 11 Mar 2020 09:37 )             42.8       03-11    132<L>  |  95<L>  |  46<H>  ----------------------------<  118<H>  4.8   |  27  |  6.50<H>    Ca    11.5<H>      11 Mar 2020 09:37    TPro  9.3<H>  /  Alb  3.5  /  TBili  0.6  /  DBili  x   /  AST  37  /  ALT  20  /  AlkPhos  169<H>  03-11

## 2020-03-11 NOTE — H&P ADULT - NSHPPHYSICALEXAM_GEN_ALL_CORE
Vital Signs Last 24 Hrs  T(C): 36.4 (11 Mar 2020 15:00), Max: 36.9 (11 Mar 2020 11:16)  T(F): 97.6 (11 Mar 2020 15:00), Max: 98.4 (11 Mar 2020 11:16)  HR: 68 (11 Mar 2020 15:00) (68 - 77)  BP: 152/75 (11 Mar 2020 15:00) (150/82 - 153/95)  BP(mean): 95 (11 Mar 2020 15:00) (95 - 95)  RR: 18 (11 Mar 2020 15:00) (18 - 20)  SpO2: 100% (11 Mar 2020 15:00) (100% - 100%)    PHYSICAL EXAM:  GENERAL: male in bed, morbidly obese   HEENT: Normocephalic;  conjunctivae and sclerae clear; moist mucous membranes;   NECK : supple  CHEST/LUNG: Clear to auscultation bilaterally with good air entry   HEART: S1 S2  regular; no murmurs, gallops or rubs  ABDOMEN: Soft, Nontender, Nondistended; Bowel sounds present  EXTREMITIES: no cyanosis; no edema; no calf tenderness  SKIN: warm and dry; no rash  NERVOUS SYSTEM:  Awake and alert; Oriented  to place, person and time ; no new deficits Vital Signs Last 24 Hrs  T(C): 36.4 (11 Mar 2020 15:00), Max: 36.9 (11 Mar 2020 11:16)  T(F): 97.6 (11 Mar 2020 15:00), Max: 98.4 (11 Mar 2020 11:16)  HR: 68 (11 Mar 2020 15:00) (68 - 77)  BP: 152/75 (11 Mar 2020 15:00) (150/82 - 153/95)  BP(mean): 95 (11 Mar 2020 15:00) (95 - 95)  RR: 18 (11 Mar 2020 15:00) (18 - 20)  SpO2: 100% (11 Mar 2020 15:00) (100% - 100%)    PHYSICAL EXAM:  GENERAL: male in bed, morbidly obese   HEENT: Normocephalic;  conjunctivae and sclerae clear; moist mucous membranes;   NECK : supple  CHEST/LUNG: Clear to auscultation bilaterally with good air entry   HEART: S1 S2  regular; no murmurs, gallops or rubs  ABDOMEN: Soft, Nontender, Nondistended; Bowel sounds present  EXTREMITIES: no cyanosis; no edema; no calf tenderness  SKIN: warm and dry; no rash  NERVOUS SYSTEM:  Awake and alert; Oriented  to place, person and time ; no new deficits  VASCULAR- stasis dermatitis changes both LE; distal peripheral pulses+  PSYCH: normal affect

## 2020-03-11 NOTE — ED PROVIDER NOTE - PHYSICAL EXAMINATION
General: A&Ox3; NAD, morbidly obese  Head: NC/AT; PERRL; EOMI; anicteric sclera  Neck: Supple; no JVD  Respiratory: CTA B/L; no wheezes/crackles/rales auscultated w/ fair air movement, limited by obesity  Cardiovascular: Regular rhythm/rate; S1/S2; no gallops or murmurs auscultated  Gastrointestinal: Soft; NTND w/out rebound tenderness or guarding; bowel sounds normal  Extremities: WWP; +1 edema, no cyanosis; radial/pedal pulses palpable; increased pigmentation of medial ankles b/l  Neurological:  CNII-XII grossly intact; no obvious focal deficits  Skin: intact, multiple tattoos

## 2020-03-11 NOTE — H&P ADULT - NSHPSOCIALHISTORY_GEN_ALL_CORE
from home   quit smoking 6 months ago, was smoking 2 packs a day prior to that   Alcohol intake over weekends, 1-2 drinks

## 2020-03-11 NOTE — CONSULT NOTE ADULT - SUBJECTIVE AND OBJECTIVE BOX
Time of visit:    CHIEF COMPLAINT: Patient is a 39y old  Male who presents with a chief complaint of chest pain (11 Mar 2020 18:22)      HPI:  40 y/o morbidly obese male from home PMH ESRD (on MWF HD via LUE AVF [facility] w/ Dr La), HTN, IDDM, Substance abuse in the past, COPD (former smoker, on home oxygen 2L), Bipolar disorder, and Schizophrenia p/w atypical chest pain - occurred this AM during HD, s/p 2 hours (out of usual 5 hour), began on the R side as focal and sharp w/ radiation to L chest, unrelieved w/ rest, and persistent. Otherwise denied any Fever, changes in dyspnea, abdominal pain, NVDC, urinary changes, or any other complaints. Denies any cough, shortness of breath. Presently denies any complain. (11 Mar 2020 18:22)   Patient seen and examined.     PAST MEDICAL & SURGICAL HISTORY:  COPD (chronic obstructive pulmonary disease)  Migraine  HTN (hypertension)  DM (diabetes mellitus)  Bipolar disorder  Schizophrenia  ESRD on dialysis  Morbid obesity with BMI of 40.0-44.9, adult  S/P arteriovenous (AV) fistula creation  H/O hernia repair      Allergies    aspirin (Swelling)  fish (Unknown)  penicillins (Swelling)  shellfish (Unknown)    Intolerances        MEDICATIONS  (STANDING):  amLODIPine   Tablet 5 milliGRAM(s) Oral daily  azithromycin  IVPB      budesonide 160 MICROgram(s)/formoterol 4.5 MICROgram(s) Inhaler 2 Puff(s) Inhalation two times a day  cefTRIAXone   IVPB      clopidogrel Tablet 75 milliGRAM(s) Oral daily  dextrose 50% Injectable 25 Gram(s) IV Push once  ferrous    sulfate 325 milliGRAM(s) Oral daily  gabapentin 300 milliGRAM(s) Oral at bedtime  heparin  Injectable 5000 Unit(s) SubCutaneous every 12 hours  hydrALAZINE 25 milliGRAM(s) Oral three times a day  insulin glargine Injectable (LANTUS) 20 Unit(s) SubCutaneous at bedtime  insulin lispro (HumaLOG) corrective regimen sliding scale   SubCutaneous Before meals and at bedtime  insulin lispro Injectable (HumaLOG) 10 Unit(s) SubCutaneous three times a day before meals  montelukast 10 milliGRAM(s) Oral daily  pantoprazole    Tablet 40 milliGRAM(s) Oral before breakfast  PARoxetine 20 milliGRAM(s) Oral daily  risperiDONE   Tablet 1 milliGRAM(s) Oral every 12 hours  sevelamer carbonate 800 milliGRAM(s) Oral three times a day with meals  simvastatin 40 milliGRAM(s) Oral at bedtime      MEDICATIONS  (PRN):  acetaminophen   Tablet .. 650 milliGRAM(s) Oral every 6 hours PRN Temp greater or equal to 38C (100.4F), Mild Pain (1 - 3)  ALBUTerol    90 MICROgram(s) HFA Inhaler 2 Puff(s) Inhalation every 6 hours PRN Bronchospasm  traMADol 25 milliGRAM(s) Oral every 8 hours PRN Severe Pain (7 - 10)   Medications up to date at time of exam.    Medications up to date at time of exam.    FAMILY HISTORY:  No pertinent family history in first degree relatives      SOCIAL HISTORY  Smoking History: [   ] smoking/smoke exposure, [   ] former smoker  Living Condition: [   ] apartment, [   ] private house  Work History:   Travel History: denies recent travel  Illicit Substance Use: denies  Alcohol Use: denies    REVIEW OF SYSTEMS:    CONSTITUTIONAL:  denies fevers, chills, sweats, weight loss    HEENT:  denies diplopia or blurred vision, sore throat or runny nose.    CARDIOVASCULAR:  denies pressure, squeezing, tightness, or heaviness about the chest; no palpitations.    RESPIRATORY:  denies SOB, cough, PIMENTEL, wheezing.    GASTROINTESTINAL:  denies abdominal pain, nausea, vomiting or diarrhea.    GENITOURINARY: denies dysuria, frequency or urgency.    NEUROLOGIC:  denies numbness, tingling, seizures or weakness.    PSYCHIATRIC:  denies disorder of thought or mood.    MSK: denies swelling, redness      PHYSICAL EXAMINATION:    GENERAL: The patient is a well-developed, well-nourished, in no apparent distress.     Vital Signs Last 24 Hrs  T(C): 36.7 (11 Mar 2020 19:50), Max: 36.9 (11 Mar 2020 11:16)  T(F): 98 (11 Mar 2020 19:50), Max: 98.4 (11 Mar 2020 11:16)  HR: 66 (11 Mar 2020 19:50) (66 - 77)  BP: 150/82 (11 Mar 2020 19:50) (150/82 - 153/95)  BP(mean): 95 (11 Mar 2020 15:00) (95 - 95)  RR: 18 (11 Mar 2020 19:50) (18 - 20)  SpO2: 100% (11 Mar 2020 19:50) (100% - 100%)   (if applicable)    Chest Tube (if applicable)    HEENT: Head is normocephalic and atraumatic. Extraocular muscles are intact. Mucous membranes are moist.     NECK: Supple, no palpable adenopathy.    LUNGS: Clear to auscultation, no wheezing, rales, or rhonchi.    HEART: Regular rate and rhythm without murmur.    ABDOMEN: Soft, nontender, and nondistended.  No hepatosplenomegaly is noted.    RENAL: No difficulty voiding, no pelvic pain    EXTREMITIES: Without any cyanosis, clubbing, rash, lesions or edema.    NEUROLOGIC: Awake, alert, oriented, grossly intact    SKIN: Warm, dry, good turgor.      LABS:                        13.1   9.03  )-----------( 277      ( 11 Mar 2020 09:37 )             42.8     03-11    132<L>  |  95<L>  |  46<H>  ----------------------------<  118<H>  4.8   |  27  |  6.50<H>    Ca    11.5<H>      11 Mar 2020 09:37    TPro  9.3<H>  /  Alb  3.5  /  TBili  0.6  /  DBili  x   /  AST  37  /  ALT  20  /  AlkPhos  169<H>  03-11          CARDIAC MARKERS ( 11 Mar 2020 12:31 )  <0.015 ng/mL / x     / 36 U/L / x     / 1.2 ng/mL  CARDIAC MARKERS ( 11 Mar 2020 09:37 )  <0.015 ng/mL / x     / x     / x     / x                    MICROBIOLOGY: (if applicable)    RADIOLOGY & ADDITIONAL STUDIES:  EKG:   CXR:  ECHO:    IMPRESSION: 39y Male PAST MEDICAL & SURGICAL HISTORY:  COPD (chronic obstructive pulmonary disease)  Migraine  HTN (hypertension)  DM (diabetes mellitus)  Bipolar disorder  Schizophrenia  ESRD on dialysis  Morbid obesity with BMI of 40.0-44.9, adult  S/P arteriovenous (AV) fistula creation  H/O hernia repair   p/w                   RECOMMENDATIONS:

## 2020-03-12 LAB
24R-OH-CALCIDIOL SERPL-MCNC: 43 NG/ML — SIGNIFICANT CHANGE UP (ref 30–80)
ALBUMIN SERPL ELPH-MCNC: 3.1 G/DL — LOW (ref 3.5–5)
ALP SERPL-CCNC: 155 U/L — HIGH (ref 40–120)
ALT FLD-CCNC: 20 U/L DA — SIGNIFICANT CHANGE UP (ref 10–60)
ANION GAP SERPL CALC-SCNC: 11 MMOL/L — SIGNIFICANT CHANGE UP (ref 5–17)
AST SERPL-CCNC: 19 U/L — SIGNIFICANT CHANGE UP (ref 10–40)
BASOPHILS # BLD AUTO: 0.02 K/UL — SIGNIFICANT CHANGE UP (ref 0–0.2)
BASOPHILS NFR BLD AUTO: 0.3 % — SIGNIFICANT CHANGE UP (ref 0–2)
BILIRUB SERPL-MCNC: 0.4 MG/DL — SIGNIFICANT CHANGE UP (ref 0.2–1.2)
BUN SERPL-MCNC: 65 MG/DL — HIGH (ref 7–18)
CALCIUM SERPL-MCNC: 11.2 MG/DL — HIGH (ref 8.4–10.5)
CHLORIDE SERPL-SCNC: 94 MMOL/L — LOW (ref 96–108)
CHOLEST SERPL-MCNC: 126 MG/DL — SIGNIFICANT CHANGE UP (ref 10–199)
CO2 SERPL-SCNC: 28 MMOL/L — SIGNIFICANT CHANGE UP (ref 22–31)
CREAT SERPL-MCNC: 8.12 MG/DL — HIGH (ref 0.5–1.3)
EOSINOPHIL # BLD AUTO: 0.36 K/UL — SIGNIFICANT CHANGE UP (ref 0–0.5)
EOSINOPHIL NFR BLD AUTO: 5 % — SIGNIFICANT CHANGE UP (ref 0–6)
FOLATE SERPL-MCNC: 5.6 NG/ML — SIGNIFICANT CHANGE UP
GLUCOSE BLDC GLUCOMTR-MCNC: 105 MG/DL — HIGH (ref 70–99)
GLUCOSE BLDC GLUCOMTR-MCNC: 124 MG/DL — HIGH (ref 70–99)
GLUCOSE BLDC GLUCOMTR-MCNC: 148 MG/DL — HIGH (ref 70–99)
GLUCOSE BLDC GLUCOMTR-MCNC: 88 MG/DL — SIGNIFICANT CHANGE UP (ref 70–99)
GLUCOSE BLDC GLUCOMTR-MCNC: 98 MG/DL — SIGNIFICANT CHANGE UP (ref 70–99)
GLUCOSE SERPL-MCNC: 131 MG/DL — HIGH (ref 70–99)
HBA1C BLD-MCNC: 4.8 % — SIGNIFICANT CHANGE UP (ref 4–5.6)
HCT VFR BLD CALC: 36.8 % — LOW (ref 39–50)
HDLC SERPL-MCNC: 54 MG/DL — SIGNIFICANT CHANGE UP
HGB BLD-MCNC: 11.5 G/DL — LOW (ref 13–17)
IMM GRANULOCYTES NFR BLD AUTO: 0.3 % — SIGNIFICANT CHANGE UP (ref 0–1.5)
LIPID PNL WITH DIRECT LDL SERPL: 55 MG/DL — SIGNIFICANT CHANGE UP
LYMPHOCYTES # BLD AUTO: 0.91 K/UL — LOW (ref 1–3.3)
LYMPHOCYTES # BLD AUTO: 12.6 % — LOW (ref 13–44)
MAGNESIUM SERPL-MCNC: 2.6 MG/DL — SIGNIFICANT CHANGE UP (ref 1.6–2.6)
MCHC RBC-ENTMCNC: 29.3 PG — SIGNIFICANT CHANGE UP (ref 27–34)
MCHC RBC-ENTMCNC: 31.3 GM/DL — LOW (ref 32–36)
MCV RBC AUTO: 93.6 FL — SIGNIFICANT CHANGE UP (ref 80–100)
MONOCYTES # BLD AUTO: 0.86 K/UL — SIGNIFICANT CHANGE UP (ref 0–0.9)
MONOCYTES NFR BLD AUTO: 11.9 % — SIGNIFICANT CHANGE UP (ref 2–14)
NEUTROPHILS # BLD AUTO: 5.03 K/UL — SIGNIFICANT CHANGE UP (ref 1.8–7.4)
NEUTROPHILS NFR BLD AUTO: 69.9 % — SIGNIFICANT CHANGE UP (ref 43–77)
NRBC # BLD: 0 /100 WBCS — SIGNIFICANT CHANGE UP (ref 0–0)
PHOSPHATE SERPL-MCNC: 8.6 MG/DL — HIGH (ref 2.5–4.5)
PLATELET # BLD AUTO: 246 K/UL — SIGNIFICANT CHANGE UP (ref 150–400)
POTASSIUM SERPL-MCNC: 4.6 MMOL/L — SIGNIFICANT CHANGE UP (ref 3.5–5.3)
POTASSIUM SERPL-SCNC: 4.6 MMOL/L — SIGNIFICANT CHANGE UP (ref 3.5–5.3)
PROT SERPL-MCNC: 8.1 G/DL — SIGNIFICANT CHANGE UP (ref 6–8.3)
RBC # BLD: 3.93 M/UL — LOW (ref 4.2–5.8)
RBC # FLD: 15 % — HIGH (ref 10.3–14.5)
SODIUM SERPL-SCNC: 133 MMOL/L — LOW (ref 135–145)
TOTAL CHOLESTEROL/HDL RATIO MEASUREMENT: 2.3 RATIO — LOW (ref 3.4–9.6)
TRIGL SERPL-MCNC: 87 MG/DL — SIGNIFICANT CHANGE UP (ref 10–149)
TSH SERPL-MCNC: 3.72 UU/ML — SIGNIFICANT CHANGE UP (ref 0.34–4.82)
VIT B12 SERPL-MCNC: 832 PG/ML — SIGNIFICANT CHANGE UP (ref 232–1245)
WBC # BLD: 7.2 K/UL — SIGNIFICANT CHANGE UP (ref 3.8–10.5)
WBC # FLD AUTO: 7.2 K/UL — SIGNIFICANT CHANGE UP (ref 3.8–10.5)

## 2020-03-12 PROCEDURE — 99233 SBSQ HOSP IP/OBS HIGH 50: CPT | Mod: GC

## 2020-03-12 RX ORDER — ONDANSETRON 8 MG/1
4 TABLET, FILM COATED ORAL ONCE
Refills: 0 | Status: COMPLETED | OUTPATIENT
Start: 2020-03-12 | End: 2020-03-12

## 2020-03-12 RX ORDER — CHLORHEXIDINE GLUCONATE 213 G/1000ML
1 SOLUTION TOPICAL
Refills: 0 | Status: DISCONTINUED | OUTPATIENT
Start: 2020-03-12 | End: 2020-03-14

## 2020-03-12 RX ORDER — OXYCODONE AND ACETAMINOPHEN 5; 325 MG/1; MG/1
1 TABLET ORAL ONCE
Refills: 0 | Status: DISCONTINUED | OUTPATIENT
Start: 2020-03-12 | End: 2020-03-12

## 2020-03-12 RX ORDER — INSULIN LISPRO 100/ML
6 VIAL (ML) SUBCUTANEOUS
Refills: 0 | Status: DISCONTINUED | OUTPATIENT
Start: 2020-03-12 | End: 2020-03-14

## 2020-03-12 RX ORDER — INSULIN GLARGINE 100 [IU]/ML
16 INJECTION, SOLUTION SUBCUTANEOUS AT BEDTIME
Refills: 0 | Status: DISCONTINUED | OUTPATIENT
Start: 2020-03-12 | End: 2020-03-14

## 2020-03-12 RX ORDER — LIDOCAINE 4 G/100G
1 CREAM TOPICAL DAILY
Refills: 0 | Status: DISCONTINUED | OUTPATIENT
Start: 2020-03-12 | End: 2020-03-14

## 2020-03-12 RX ADMIN — PANTOPRAZOLE SODIUM 40 MILLIGRAM(S): 20 TABLET, DELAYED RELEASE ORAL at 05:12

## 2020-03-12 RX ADMIN — LIDOCAINE 1 PATCH: 4 CREAM TOPICAL at 19:15

## 2020-03-12 RX ADMIN — TRAMADOL HYDROCHLORIDE 25 MILLIGRAM(S): 50 TABLET ORAL at 20:52

## 2020-03-12 RX ADMIN — AZITHROMYCIN 255 MILLIGRAM(S): 500 TABLET, FILM COATED ORAL at 19:02

## 2020-03-12 RX ADMIN — CLOPIDOGREL BISULFATE 75 MILLIGRAM(S): 75 TABLET, FILM COATED ORAL at 12:14

## 2020-03-12 RX ADMIN — SIMVASTATIN 40 MILLIGRAM(S): 20 TABLET, FILM COATED ORAL at 21:55

## 2020-03-12 RX ADMIN — Medication 25 MILLIGRAM(S): at 05:12

## 2020-03-12 RX ADMIN — LIDOCAINE 1 PATCH: 4 CREAM TOPICAL at 17:21

## 2020-03-12 RX ADMIN — ONDANSETRON 4 MILLIGRAM(S): 8 TABLET, FILM COATED ORAL at 23:49

## 2020-03-12 RX ADMIN — MONTELUKAST 10 MILLIGRAM(S): 4 TABLET, CHEWABLE ORAL at 12:14

## 2020-03-12 RX ADMIN — OXYCODONE AND ACETAMINOPHEN 1 TABLET(S): 5; 325 TABLET ORAL at 22:15

## 2020-03-12 RX ADMIN — RISPERIDONE 1 MILLIGRAM(S): 4 TABLET ORAL at 05:12

## 2020-03-12 RX ADMIN — Medication 325 MILLIGRAM(S): at 12:14

## 2020-03-12 RX ADMIN — Medication 650 MILLIGRAM(S): at 02:14

## 2020-03-12 RX ADMIN — OXYCODONE AND ACETAMINOPHEN 1 TABLET(S): 5; 325 TABLET ORAL at 22:45

## 2020-03-12 RX ADMIN — SEVELAMER CARBONATE 800 MILLIGRAM(S): 2400 POWDER, FOR SUSPENSION ORAL at 17:21

## 2020-03-12 RX ADMIN — Medication 25 MILLIGRAM(S): at 21:55

## 2020-03-12 RX ADMIN — TRAMADOL HYDROCHLORIDE 25 MILLIGRAM(S): 50 TABLET ORAL at 12:14

## 2020-03-12 RX ADMIN — BUDESONIDE AND FORMOTEROL FUMARATE DIHYDRATE 2 PUFF(S): 160; 4.5 AEROSOL RESPIRATORY (INHALATION) at 10:02

## 2020-03-12 RX ADMIN — BUDESONIDE AND FORMOTEROL FUMARATE DIHYDRATE 2 PUFF(S): 160; 4.5 AEROSOL RESPIRATORY (INHALATION) at 21:56

## 2020-03-12 RX ADMIN — Medication 20 MILLIGRAM(S): at 12:14

## 2020-03-12 RX ADMIN — Medication 650 MILLIGRAM(S): at 10:32

## 2020-03-12 RX ADMIN — RISPERIDONE 1 MILLIGRAM(S): 4 TABLET ORAL at 17:22

## 2020-03-12 RX ADMIN — CEFTRIAXONE 100 MILLIGRAM(S): 500 INJECTION, POWDER, FOR SOLUTION INTRAMUSCULAR; INTRAVENOUS at 19:02

## 2020-03-12 RX ADMIN — TRAMADOL HYDROCHLORIDE 25 MILLIGRAM(S): 50 TABLET ORAL at 21:22

## 2020-03-12 RX ADMIN — ONDANSETRON 4 MILLIGRAM(S): 8 TABLET, FILM COATED ORAL at 01:59

## 2020-03-12 RX ADMIN — HEPARIN SODIUM 5000 UNIT(S): 5000 INJECTION INTRAVENOUS; SUBCUTANEOUS at 05:13

## 2020-03-12 RX ADMIN — Medication 650 MILLIGRAM(S): at 02:44

## 2020-03-12 RX ADMIN — AMLODIPINE BESYLATE 5 MILLIGRAM(S): 2.5 TABLET ORAL at 05:12

## 2020-03-12 RX ADMIN — Medication 650 MILLIGRAM(S): at 10:02

## 2020-03-12 RX ADMIN — SEVELAMER CARBONATE 800 MILLIGRAM(S): 2400 POWDER, FOR SUSPENSION ORAL at 12:14

## 2020-03-12 RX ADMIN — GABAPENTIN 300 MILLIGRAM(S): 400 CAPSULE ORAL at 21:56

## 2020-03-12 RX ADMIN — TRAMADOL HYDROCHLORIDE 25 MILLIGRAM(S): 50 TABLET ORAL at 02:58

## 2020-03-12 RX ADMIN — HEPARIN SODIUM 5000 UNIT(S): 5000 INJECTION INTRAVENOUS; SUBCUTANEOUS at 17:24

## 2020-03-12 RX ADMIN — TRAMADOL HYDROCHLORIDE 25 MILLIGRAM(S): 50 TABLET ORAL at 03:28

## 2020-03-12 NOTE — PROGRESS NOTE ADULT - PROBLEM SELECTOR PLAN 1
-presented with chest pain and productive cough  -CT chest: Left upper lobe airspace consolidation compatible with pneumonia in the appropriate clinical setting.  -Wbc; WNL, Afbrile  -Continue coverage for CAP with Rocephin and Azithro   -Pain management with tylenol, lidocaine and warm compresses for costochondritis  -will evaluated fo rneed for home O2, recorded as 92% on RA by pulm team, will reassess and  pt regarding the same

## 2020-03-12 NOTE — PROGRESS NOTE ADULT - ASSESSMENT
40 y/o morbidly obese male from home PMH ESRD (on MWF HD via LUE AVF at [facility] w/ Dr La), HTN, IDDM, Substance abuse in the past, COPD (former smoker, on home oxygen 2L), Bipolar disorder, and Schizophrenia p/w atypical chest pain - occurred this AM during HD, s/p 2 hours (out of usual 5 hour), began on the R side as focal and sharp w/ radiation to L chest, unrelieved w/ rest, and persistent. Otherwise denied any Fever, changes in dyspnea, abdominal pain, NVDC, urinary changes, or any other complaints. Denies any cough, shortness of breath. Presently denies any complain.     Admitted with PNA

## 2020-03-12 NOTE — PROGRESS NOTE ADULT - PROBLEM SELECTOR PLAN 2
- p/w Chest pain as above, Trop: neg   - EKG: no acute changes   most likely muscle pain.   No need for additional cardiac testing at this time. Will monitor closely and reassess if symptoms recur or progress   -Pain management

## 2020-03-12 NOTE — PROGRESS NOTE ADULT - SUBJECTIVE AND OBJECTIVE BOX
PGY 2 Note discussed with primary attending.    Patient is a 39y old  Male who presents with a chief complaint of chest pain (12 Mar 2020 12:04)      INTERVAL HPI/OVERNIGHT EVENTS: patient assessed bedside, reported pain on left side of chest and arm, worse with touch. Reports no fever or chills, has productive cough, no issues with breathing. Denies sick contacts or recent travel.     MEDICATIONS  (STANDING):  amLODIPine   Tablet 5 milliGRAM(s) Oral daily  azithromycin  IVPB 500 milliGRAM(s) IV Intermittent every 24 hours  azithromycin  IVPB      budesonide 160 MICROgram(s)/formoterol 4.5 MICROgram(s) Inhaler 2 Puff(s) Inhalation two times a day  cefTRIAXone   IVPB      cefTRIAXone   IVPB 1000 milliGRAM(s) IV Intermittent every 24 hours  chlorhexidine 2% Cloths 1 Application(s) Topical <User Schedule>  clopidogrel Tablet 75 milliGRAM(s) Oral daily  dextrose 50% Injectable 25 Gram(s) IV Push once  ferrous    sulfate 325 milliGRAM(s) Oral daily  gabapentin 300 milliGRAM(s) Oral at bedtime  heparin  Injectable 5000 Unit(s) SubCutaneous every 12 hours  hydrALAZINE 25 milliGRAM(s) Oral three times a day  insulin glargine Injectable (LANTUS) 20 Unit(s) SubCutaneous at bedtime  insulin lispro (HumaLOG) corrective regimen sliding scale   SubCutaneous Before meals and at bedtime  insulin lispro Injectable (HumaLOG) 10 Unit(s) SubCutaneous three times a day before meals  lidocaine   Patch 1 Patch Transdermal daily  montelukast 10 milliGRAM(s) Oral daily  pantoprazole    Tablet 40 milliGRAM(s) Oral before breakfast  PARoxetine 20 milliGRAM(s) Oral daily  risperiDONE   Tablet 1 milliGRAM(s) Oral every 12 hours  sevelamer carbonate 800 milliGRAM(s) Oral three times a day with meals  simvastatin 40 milliGRAM(s) Oral at bedtime    MEDICATIONS  (PRN):  acetaminophen   Tablet .. 650 milliGRAM(s) Oral every 6 hours PRN Temp greater or equal to 38C (100.4F), Mild Pain (1 - 3)  ALBUTerol    90 MICROgram(s) HFA Inhaler 2 Puff(s) Inhalation every 6 hours PRN Bronchospasm  traMADol 25 milliGRAM(s) Oral every 8 hours PRN Severe Pain (7 - 10)      Allergies    aspirin (Swelling)  fish (Unknown)  penicillins (Swelling)  shellfish (Unknown)    Intolerances        REVIEW OF SYSTEMS:  CONSTITUTIONAL: No fever, or fatigue  RESPIRATORY: Cough, no wheezing, chills or hemoptysis; No shortness of breath  CARDIOVASCULAR: Chest wall pain, no palpitations, dizziness, or leg swelling  GASTROINTESTINAL: No abdominal or epigastric pain. No nausea, vomiting, or hematemesis; No diarrhea or constipation. No melena or hematochezia.  NEUROLOGICAL: No headaches, memory loss, loss of strength, numbness, or tremors  SKIN: No itching, burning, rashes, or lesions     Vital Signs Last 24 Hrs  T(C): 36.8 (12 Mar 2020 13:11), Max: 36.8 (12 Mar 2020 13:11)  T(F): 98.2 (12 Mar 2020 13:11), Max: 98.2 (12 Mar 2020 13:11)  HR: 69 (12 Mar 2020 13:11) (66 - 69)  BP: 135/78 (12 Mar 2020 13:11) (135/78 - 175/103)  BP(mean): --  RR: 18 (12 Mar 2020 13:11) (16 - 18)  SpO2: 96% (12 Mar 2020 06:01) (96% - 100%)    PHYSICAL EXAM:  GENERAL: NAD, well-groomed, well-developed, obese, on NC  HEAD:  Atraumatic, Normocephalic  EYES: EOMI, PERRLA, conjunctiva and sclera clear  NECK: Supple, No JVD, Normal thyroid  CHEST/LUNG: BLAE+, occasional wheezes and rhonchi appreciated over left upper and middle zones  HEART: Regular rate and rhythm; No murmurs, rubs, or gallops  ABDOMEN: Soft, Nontender, Nondistended; Bowel sounds present  NERVOUS SYSTEM:  Alert & Oriented X3, Good concentration; Motor Strength 5/5 B/L   EXTREMITIES:  2+ Peripheral Pulses, No clubbing, cyanosis, or edema  SKIN: stasis dermatitis on BLE    LABS:                        11.5   7.20  )-----------( 246      ( 12 Mar 2020 13:26 )             36.8     03-12    133<L>  |  94<L>  |  65<H>  ----------------------------<  131<H>  4.6   |  28  |  8.12<H>    Ca    11.2<H>      12 Mar 2020 13:26  Phos  8.6     03-12  Mg     2.6     03-12    TPro  8.1  /  Alb  3.1<L>  /  TBili  0.4  /  DBili  x   /  AST  19  /  ALT  20  /  AlkPhos  155<H>  03-12        CAPILLARY BLOOD GLUCOSE  105 (11 Mar 2020 21:40)      POCT Blood Glucose.: 124 mg/dL (12 Mar 2020 11:50)  POCT Blood Glucose.: 88 mg/dL (12 Mar 2020 07:40)  POCT Blood Glucose.: 105 mg/dL (11 Mar 2020 21:40)      Consultant(s) Notes Reviewed:  [ x ] YES  [ ] NO

## 2020-03-12 NOTE — PROGRESS NOTE ADULT - SUBJECTIVE AND OBJECTIVE BOX
Time of Visit:  Patient seen and examined.     MEDICATIONS  (STANDING):  amLODIPine   Tablet 5 milliGRAM(s) Oral daily  azithromycin  IVPB 500 milliGRAM(s) IV Intermittent every 24 hours  azithromycin  IVPB      budesonide 160 MICROgram(s)/formoterol 4.5 MICROgram(s) Inhaler 2 Puff(s) Inhalation two times a day  cefTRIAXone   IVPB      cefTRIAXone   IVPB 1000 milliGRAM(s) IV Intermittent every 24 hours  clopidogrel Tablet 75 milliGRAM(s) Oral daily  dextrose 50% Injectable 25 Gram(s) IV Push once  ferrous    sulfate 325 milliGRAM(s) Oral daily  gabapentin 300 milliGRAM(s) Oral at bedtime  heparin  Injectable 5000 Unit(s) SubCutaneous every 12 hours  hydrALAZINE 25 milliGRAM(s) Oral three times a day  insulin glargine Injectable (LANTUS) 20 Unit(s) SubCutaneous at bedtime  insulin lispro (HumaLOG) corrective regimen sliding scale   SubCutaneous Before meals and at bedtime  insulin lispro Injectable (HumaLOG) 10 Unit(s) SubCutaneous three times a day before meals  lidocaine   Patch 1 Patch Transdermal daily  montelukast 10 milliGRAM(s) Oral daily  pantoprazole    Tablet 40 milliGRAM(s) Oral before breakfast  PARoxetine 20 milliGRAM(s) Oral daily  risperiDONE   Tablet 1 milliGRAM(s) Oral every 12 hours  sevelamer carbonate 800 milliGRAM(s) Oral three times a day with meals  simvastatin 40 milliGRAM(s) Oral at bedtime      MEDICATIONS  (PRN):  acetaminophen   Tablet .. 650 milliGRAM(s) Oral every 6 hours PRN Temp greater or equal to 38C (100.4F), Mild Pain (1 - 3)  ALBUTerol    90 MICROgram(s) HFA Inhaler 2 Puff(s) Inhalation every 6 hours PRN Bronchospasm  traMADol 25 milliGRAM(s) Oral every 8 hours PRN Severe Pain (7 - 10)       Medications up to date at time of exam.    ROS; No fever, chills, cough, congestion.   PHYSICAL EXAMINATION:    Vital Signs Last 24 Hrs  T(C): 36.2 (12 Mar 2020 05:08), Max: 36.7 (11 Mar 2020 19:50)  T(F): 97.2 (12 Mar 2020 05:08), Max: 98 (11 Mar 2020 19:50)  HR: 69 (12 Mar 2020 06:01) (66 - 69)  BP: 149/83 (12 Mar 2020 06:01) (144/88 - 175/103)  BP(mean): 95 (11 Mar 2020 15:00) (95 - 95)  RR: 16 (12 Mar 2020 06:01) (16 - 18)  SpO2: 96% (12 Mar 2020 06:01) (96% - 100%)   (if applicable)    General: Alert and oriented. No acute distress.     HEENT: Head is normocephalic and atraumatic. No nasal tenderness. Extraocular muscles are intact. Mucous membranes are moist.     NECK: Supple, no palpable adenopathy.    LUNGS: Clear to auscultation, no wheezing, rales, or rhonchi. No use of accessory muscle.      HEART: S1 S2 Regular rate and no click/ rub.     ABDOMEN: Soft, nontender, and nondistended.  No abdominal guarding .     EXTREMITIES: Without any cyanosis, clubbing, rash, lesions or edema.    NEUROLOGIC: Awake, alert, oriented.     SKIN: Warm and moist. Non diaphoretic.       LABS:                        13.1   9.03  )-----------( 277      ( 11 Mar 2020 09:37 )             42.8     03-11    132<L>  |  95<L>  |  46<H>  ----------------------------<  118<H>  4.8   |  27  |  6.50<H>    Ca    11.5<H>      11 Mar 2020 09:37    TPro  9.3<H>  /  Alb  3.5  /  TBili  0.6  /  DBili  x   /  AST  37  /  ALT  20  /  AlkPhos  169<H>  03-11          CARDIAC MARKERS ( 11 Mar 2020 12:31 )  <0.015 ng/mL / x     / 36 U/L / x     / 1.2 ng/mL  CARDIAC MARKERS ( 11 Mar 2020 09:37 )  <0.015 ng/mL / x     / x     / x     / x            MICROBIOLOGY: (if applicable)    RADIOLOGY & ADDITIONAL STUDIES:  EKG:   CT Chest: < from: CT Chest No Cont (03.11.20 @ 12:30) >    EXAM:  CT CHEST                            PROCEDURE DATE:  03/11/2020          INTERPRETATION:  CLINICAL INFORMATION: Chest pain. Follow-up for abnormality in chest radiograph.    COMPARISON: Chest radiograph 3/11/2020.    PROCEDURE:   CT of the Chest was performed without intravenous contrast.  Sagittal and coronal reformats were performed.      FINDINGS:    LUNGS AND AIRWAYS:   PLEURA: There is dense airspace consolidation left upper lobe.  There is mild generalized mosaic groundglass attenuation both lungs, which may reflect presence of air trapping.    The central airways remain patent.    No significant pleural effusion is noted.    MEDIASTINUM AND MANJINDER: No lymphadenopathy.    VESSELS: Within normal limits.    HEART: Cardiomegaly. Trace pericardial effusion/thickening.    CHEST WALL AND LOWER NECK: Gynecomastia.    VISUALIZED UPPER ABDOMEN:   Mild splenomegaly.    BONES: Within normal limits.    IMPRESSION:     Left upper lobe airspace consolidation compatible with pneumonia in the appropriate clinical setting.    Recommend follow-up PA and lateral chest radiograph after the appropriate clinical course of treatment.    Other findings as discussed above.      IMPRESSION: 39y Male PAST MEDICAL & SURGICAL HISTORY:  COPD (chronic obstructive pulmonary disease)  Migraine  HTN (hypertension)  DM (diabetes mellitus)  Bipolar disorder  Schizophrenia  ESRD on dialysis  Morbid obesity with BMI of 40.0-44.9, adult  S/P arteriovenous (AV) fistula creation  H/O hernia repair    Impression; 38 Y/O Male presented with R side as focal and sharp w/ radiation to L chest, unrelieved w/ rest , Found on CT chest with Left upper lobe airspace. Former smoker. Has chronic hypoxic respiratory failure secondary to COPD and Home O2 at 2L NC at Home.       Suggestion ;  O2 saturation 92% room air. Continue Oxygen supplementation NC.   Pulmonary oral hygiene care.  DVT/ GI prophylactic.   Budesonide Twice Daily.   Continue Singulair 10 mg Daily. Time of Visit:  Patient seen and examined.     MEDICATIONS  (STANDING):  amLODIPine   Tablet 5 milliGRAM(s) Oral daily  azithromycin  IVPB 500 milliGRAM(s) IV Intermittent every 24 hours  azithromycin  IVPB      budesonide 160 MICROgram(s)/formoterol 4.5 MICROgram(s) Inhaler 2 Puff(s) Inhalation two times a day  cefTRIAXone   IVPB      cefTRIAXone   IVPB 1000 milliGRAM(s) IV Intermittent every 24 hours  clopidogrel Tablet 75 milliGRAM(s) Oral daily  dextrose 50% Injectable 25 Gram(s) IV Push once  ferrous    sulfate 325 milliGRAM(s) Oral daily  gabapentin 300 milliGRAM(s) Oral at bedtime  heparin  Injectable 5000 Unit(s) SubCutaneous every 12 hours  hydrALAZINE 25 milliGRAM(s) Oral three times a day  insulin glargine Injectable (LANTUS) 20 Unit(s) SubCutaneous at bedtime  insulin lispro (HumaLOG) corrective regimen sliding scale   SubCutaneous Before meals and at bedtime  insulin lispro Injectable (HumaLOG) 10 Unit(s) SubCutaneous three times a day before meals  lidocaine   Patch 1 Patch Transdermal daily  montelukast 10 milliGRAM(s) Oral daily  pantoprazole    Tablet 40 milliGRAM(s) Oral before breakfast  PARoxetine 20 milliGRAM(s) Oral daily  risperiDONE   Tablet 1 milliGRAM(s) Oral every 12 hours  sevelamer carbonate 800 milliGRAM(s) Oral three times a day with meals  simvastatin 40 milliGRAM(s) Oral at bedtime      MEDICATIONS  (PRN):  acetaminophen   Tablet .. 650 milliGRAM(s) Oral every 6 hours PRN Temp greater or equal to 38C (100.4F), Mild Pain (1 - 3)  ALBUTerol    90 MICROgram(s) HFA Inhaler 2 Puff(s) Inhalation every 6 hours PRN Bronchospasm  traMADol 25 milliGRAM(s) Oral every 8 hours PRN Severe Pain (7 - 10)       Medications up to date at time of exam.    ROS; No fever, chills, cough, congestion.   PHYSICAL EXAMINATION:    Vital Signs Last 24 Hrs  T(C): 36.2 (12 Mar 2020 05:08), Max: 36.7 (11 Mar 2020 19:50)  T(F): 97.2 (12 Mar 2020 05:08), Max: 98 (11 Mar 2020 19:50)  HR: 69 (12 Mar 2020 06:01) (66 - 69)  BP: 149/83 (12 Mar 2020 06:01) (144/88 - 175/103)  BP(mean): 95 (11 Mar 2020 15:00) (95 - 95)  RR: 16 (12 Mar 2020 06:01) (16 - 18)  SpO2: 96% (12 Mar 2020 06:01) (96% - 100%)   (if applicable)    General: Alert and oriented. No acute distress.     HEENT: Head is normocephalic and atraumatic. No nasal tenderness. Extraocular muscles are intact. Mucous membranes are moist.     NECK: Supple, no palpable adenopathy.    LUNGS: Clear to auscultation, no wheezing, rales, or rhonchi. No use of accessory muscle.      HEART: S1 S2 Regular rate and no click/ rub.     ABDOMEN: Soft, nontender, and nondistended.  No abdominal guarding .     EXTREMITIES: Without any cyanosis, clubbing, rash, lesions or edema.    NEUROLOGIC: Awake, alert, oriented.     SKIN: Warm and moist. Non diaphoretic.       LABS:                        13.1   9.03  )-----------( 277      ( 11 Mar 2020 09:37 )             42.8     03-11    132<L>  |  95<L>  |  46<H>  ----------------------------<  118<H>  4.8   |  27  |  6.50<H>    Ca    11.5<H>      11 Mar 2020 09:37    TPro  9.3<H>  /  Alb  3.5  /  TBili  0.6  /  DBili  x   /  AST  37  /  ALT  20  /  AlkPhos  169<H>  03-11          CARDIAC MARKERS ( 11 Mar 2020 12:31 )  <0.015 ng/mL / x     / 36 U/L / x     / 1.2 ng/mL  CARDIAC MARKERS ( 11 Mar 2020 09:37 )  <0.015 ng/mL / x     / x     / x     / x            MICROBIOLOGY: (if applicable)    RADIOLOGY & ADDITIONAL STUDIES:  EKG:   CT Chest: < from: CT Chest No Cont (03.11.20 @ 12:30) >    EXAM:  CT CHEST                            PROCEDURE DATE:  03/11/2020          INTERPRETATION:  CLINICAL INFORMATION: Chest pain. Follow-up for abnormality in chest radiograph.    COMPARISON: Chest radiograph 3/11/2020.    PROCEDURE:   CT of the Chest was performed without intravenous contrast.  Sagittal and coronal reformats were performed.      FINDINGS:    LUNGS AND AIRWAYS:   PLEURA: There is dense airspace consolidation left upper lobe.  There is mild generalized mosaic groundglass attenuation both lungs, which may reflect presence of air trapping.    The central airways remain patent.    No significant pleural effusion is noted.    MEDIASTINUM AND MANJINDER: No lymphadenopathy.    VESSELS: Within normal limits.    HEART: Cardiomegaly. Trace pericardial effusion/thickening.    CHEST WALL AND LOWER NECK: Gynecomastia.    VISUALIZED UPPER ABDOMEN:   Mild splenomegaly.    BONES: Within normal limits.    IMPRESSION:     Left upper lobe airspace consolidation compatible with pneumonia in the appropriate clinical setting.    Recommend follow-up PA and lateral chest radiograph after the appropriate clinical course of treatment.    Other findings as discussed above.      IMPRESSION: 39y Male PAST MEDICAL & SURGICAL HISTORY:  COPD (chronic obstructive pulmonary disease)  Migraine  HTN (hypertension)  DM (diabetes mellitus)  Bipolar disorder  Schizophrenia  ESRD on dialysis  Morbid obesity with BMI of 40.0-44.9, adult  S/P arteriovenous (AV) fistula creation  H/O hernia repair    Impression;  This is a 39 yr old man with morbid obesity , chronic hypoxic resp failure due to COPD requiring supp O2 @ 2 LPM and prior mentioned medical condition presented with chest pain due to MOOSE pna.     Suggestion ;  -continue antibx  -f/u cultures  -O2 supp  -duonebs   -blood sugar control  -dialysis as per renal  -dvt/gi prophy    Agree with above assessment and plan as transcribed.

## 2020-03-12 NOTE — PROGRESS NOTE ADULT - PROBLEM SELECTOR PLAN 3
- Home dose; Lantus 20 units and nova log 10 units pre meal   - Will lower dose to 16u lantus and 6u premeal of humalog as fs too tightly controlled here  - monitor fs achs, f/u a1c - Home dose; Lantus 20 units and nova log 10 units pre meal   - Will lower dose to 16 u Lantus and 6u pre meal of Humalog as fs too tightly controlled here  - monitor fs achs, f/u a1c

## 2020-03-12 NOTE — PROGRESS NOTE ADULT - ASSESSMENT
Patient is a 39y Male with ESRD on HD TIW at [facility]. Presented to Dialysis this morning and complained of chest pain thus dialysis was stopped after 1 hour and transfered to hospital. In ED chest CT showed MOOSE airspace consolidation, admitted for IV antibiotics.  He has chronic SOB, bilateral leg edema from non-compliance with dialysis and salt/fluid restriction .   Renal consulted for ESRD.    # ESRD: seen on HD. on ABX for PNA  # Hypertension Controlled with norvasc, hydralazine. UF tolerated.  # Anemia of CKD stable off SIOBHAN.  can D/C FESO4. give venofer at dialysis.  # Lytes and acid base at goal  # chronic hypervolemia UF as tolerated.

## 2020-03-12 NOTE — PROGRESS NOTE ADULT - SUBJECTIVE AND OBJECTIVE BOX
[facility] : Progress Note :: [phone], (office [phone]),   Dr La / Dr Hui / Dr Barber / Dr Villalobos / Dr Hang CASTELLANO / Dr Mehta / Dr Sanchez / Dr Alber dumont  _____________________________________________________________________________________________    seen on HD     aspirin (Swelling)  fish (Unknown)  penicillins (Swelling)  shellfish (Unknown)    Hospital Medications:   MEDICATIONS  (STANDING):  amLODIPine   Tablet 5 milliGRAM(s) Oral daily  azithromycin  IVPB 500 milliGRAM(s) IV Intermittent every 24 hours  azithromycin  IVPB      budesonide 160 MICROgram(s)/formoterol 4.5 MICROgram(s) Inhaler 2 Puff(s) Inhalation two times a day  cefTRIAXone   IVPB      cefTRIAXone   IVPB 1000 milliGRAM(s) IV Intermittent every 24 hours  chlorhexidine 2% Cloths 1 Application(s) Topical <User Schedule>  clopidogrel Tablet 75 milliGRAM(s) Oral daily  dextrose 50% Injectable 25 Gram(s) IV Push once  ferrous    sulfate 325 milliGRAM(s) Oral daily  gabapentin 300 milliGRAM(s) Oral at bedtime  heparin  Injectable 5000 Unit(s) SubCutaneous every 12 hours  hydrALAZINE 25 milliGRAM(s) Oral three times a day  insulin glargine Injectable (LANTUS) 16 Unit(s) SubCutaneous at bedtime  insulin lispro (HumaLOG) corrective regimen sliding scale   SubCutaneous Before meals and at bedtime  insulin lispro Injectable (HumaLOG) 6 Unit(s) SubCutaneous three times a day before meals  lidocaine   Patch 1 Patch Transdermal daily  montelukast 10 milliGRAM(s) Oral daily  pantoprazole    Tablet 40 milliGRAM(s) Oral before breakfast  PARoxetine 20 milliGRAM(s) Oral daily  risperiDONE   Tablet 1 milliGRAM(s) Oral every 12 hours  sevelamer carbonate 800 milliGRAM(s) Oral three times a day with meals  simvastatin 40 milliGRAM(s) Oral at bedtime        VITALS:  T(F): 98.2 (03-12-20 @ 13:11), Max: 98.2 (03-12-20 @ 13:11)  HR: 69 (03-12-20 @ 13:11)  BP: 135/78 (03-12-20 @ 13:11)  RR: 18 (03-12-20 @ 13:11)  SpO2: 96% (03-12-20 @ 06:01)  Wt(kg): --      PHYSICAL EXAM:  Constitutional: NAD  HEENT: anicteric sclera, oropharynx clear.  Neck: No JVD  Respiratory: CTAB, no wheezes, rales or rhonchi  Cardiovascular: S1, S2, RRR  Gastrointestinal: BS+, soft, NT/ND  Extremities:  peripheral edema+  Neurological: A/O x 3, no focal deficits  : No CVA tenderness. No hernandez.   Skin: No rashes  Vascular Access: AVF cannulated.    LABS:  03-12    133<L>  |  94<L>  |  65<H>  ----------------------------<  131<H>  4.6   |  28  |  8.12<H>    Ca    11.2<H>      12 Mar 2020 13:26  Phos  8.6     03-12  Mg     2.6     03-12    TPro  8.1  /  Alb  3.1<L>  /  TBili  0.4  /  DBili      /  AST  19  /  ALT  20  /  AlkPhos  155<H>  03-12    Creatinine Trend: 8.12 <--, 6.50 <--                        11.5   7.20  )-----------( 246      ( 12 Mar 2020 13:26 )             36.8     Urine Studies:      RADIOLOGY & ADDITIONAL STUDIES:

## 2020-03-13 ENCOUNTER — TRANSCRIPTION ENCOUNTER (OUTPATIENT)
Age: 40
End: 2020-03-13

## 2020-03-13 LAB
GLUCOSE BLDC GLUCOMTR-MCNC: 100 MG/DL — HIGH (ref 70–99)
GLUCOSE BLDC GLUCOMTR-MCNC: 116 MG/DL — HIGH (ref 70–99)
GLUCOSE BLDC GLUCOMTR-MCNC: 143 MG/DL — HIGH (ref 70–99)
GLUCOSE BLDC GLUCOMTR-MCNC: 148 MG/DL — HIGH (ref 70–99)

## 2020-03-13 PROCEDURE — 99233 SBSQ HOSP IP/OBS HIGH 50: CPT | Mod: GC

## 2020-03-13 RX ORDER — ACETAMINOPHEN 500 MG
2 TABLET ORAL
Qty: 80 | Refills: 0
Start: 2020-03-13 | End: 2020-03-22

## 2020-03-13 RX ORDER — INSULIN GLARGINE 100 [IU]/ML
20 INJECTION, SOLUTION SUBCUTANEOUS
Qty: 0 | Refills: 0 | DISCHARGE

## 2020-03-13 RX ORDER — INSULIN LISPRO 100/ML
10 VIAL (ML) SUBCUTANEOUS
Qty: 0 | Refills: 0 | DISCHARGE

## 2020-03-13 RX ORDER — LIDOCAINE 4 G/100G
1 CREAM TOPICAL
Qty: 5 | Refills: 0
Start: 2020-03-13 | End: 2020-03-17

## 2020-03-13 RX ORDER — TRAMADOL HYDROCHLORIDE 50 MG/1
1 TABLET ORAL
Qty: 9 | Refills: 0
Start: 2020-03-13 | End: 2020-03-15

## 2020-03-13 RX ORDER — CEFUROXIME AXETIL 250 MG
1 TABLET ORAL
Qty: 8 | Refills: 0
Start: 2020-03-13 | End: 2020-03-16

## 2020-03-13 RX ORDER — SEVELAMER CARBONATE 2400 MG/1
1 POWDER, FOR SUSPENSION ORAL
Qty: 0 | Refills: 0 | DISCHARGE
Start: 2020-03-13

## 2020-03-13 RX ORDER — AZITHROMYCIN 500 MG/1
1 TABLET, FILM COATED ORAL
Qty: 4 | Refills: 0
Start: 2020-03-13 | End: 2020-03-16

## 2020-03-13 RX ORDER — ACETAMINOPHEN 500 MG
1000 TABLET ORAL ONCE
Refills: 0 | Status: COMPLETED | OUTPATIENT
Start: 2020-03-13 | End: 2020-03-13

## 2020-03-13 RX ORDER — TRAMADOL HYDROCHLORIDE 50 MG/1
50 TABLET ORAL EVERY 8 HOURS
Refills: 0 | Status: DISCONTINUED | OUTPATIENT
Start: 2020-03-13 | End: 2020-03-14

## 2020-03-13 RX ORDER — FERROUS SULFATE 325(65) MG
1 TABLET ORAL
Qty: 0 | Refills: 0 | DISCHARGE

## 2020-03-13 RX ORDER — MORPHINE SULFATE 50 MG/1
1 CAPSULE, EXTENDED RELEASE ORAL ONCE
Refills: 0 | Status: DISCONTINUED | OUTPATIENT
Start: 2020-03-13 | End: 2020-03-13

## 2020-03-13 RX ADMIN — HEPARIN SODIUM 5000 UNIT(S): 5000 INJECTION INTRAVENOUS; SUBCUTANEOUS at 06:38

## 2020-03-13 RX ADMIN — SEVELAMER CARBONATE 800 MILLIGRAM(S): 2400 POWDER, FOR SUSPENSION ORAL at 17:54

## 2020-03-13 RX ADMIN — Medication 25 MILLIGRAM(S): at 06:38

## 2020-03-13 RX ADMIN — GABAPENTIN 300 MILLIGRAM(S): 400 CAPSULE ORAL at 21:55

## 2020-03-13 RX ADMIN — TRAMADOL HYDROCHLORIDE 50 MILLIGRAM(S): 50 TABLET ORAL at 11:15

## 2020-03-13 RX ADMIN — Medication 25 MILLIGRAM(S): at 21:55

## 2020-03-13 RX ADMIN — Medication 400 MILLIGRAM(S): at 15:10

## 2020-03-13 RX ADMIN — LIDOCAINE 1 PATCH: 4 CREAM TOPICAL at 09:50

## 2020-03-13 RX ADMIN — CEFTRIAXONE 100 MILLIGRAM(S): 500 INJECTION, POWDER, FOR SOLUTION INTRAMUSCULAR; INTRAVENOUS at 17:54

## 2020-03-13 RX ADMIN — TRAMADOL HYDROCHLORIDE 25 MILLIGRAM(S): 50 TABLET ORAL at 07:04

## 2020-03-13 RX ADMIN — SIMVASTATIN 40 MILLIGRAM(S): 20 TABLET, FILM COATED ORAL at 21:55

## 2020-03-13 RX ADMIN — Medication 1000 MILLIGRAM(S): at 15:25

## 2020-03-13 RX ADMIN — HEPARIN SODIUM 5000 UNIT(S): 5000 INJECTION INTRAVENOUS; SUBCUTANEOUS at 18:01

## 2020-03-13 RX ADMIN — Medication 20 MILLIGRAM(S): at 11:49

## 2020-03-13 RX ADMIN — TRAMADOL HYDROCHLORIDE 50 MILLIGRAM(S): 50 TABLET ORAL at 10:40

## 2020-03-13 RX ADMIN — MONTELUKAST 10 MILLIGRAM(S): 4 TABLET, CHEWABLE ORAL at 11:49

## 2020-03-13 RX ADMIN — MORPHINE SULFATE 1 MILLIGRAM(S): 50 CAPSULE, EXTENDED RELEASE ORAL at 01:13

## 2020-03-13 RX ADMIN — LIDOCAINE 1 PATCH: 4 CREAM TOPICAL at 05:00

## 2020-03-13 RX ADMIN — ALBUTEROL 2 PUFF(S): 90 AEROSOL, METERED ORAL at 06:40

## 2020-03-13 RX ADMIN — MORPHINE SULFATE 1 MILLIGRAM(S): 50 CAPSULE, EXTENDED RELEASE ORAL at 00:43

## 2020-03-13 RX ADMIN — TRAMADOL HYDROCHLORIDE 25 MILLIGRAM(S): 50 TABLET ORAL at 06:38

## 2020-03-13 RX ADMIN — RISPERIDONE 1 MILLIGRAM(S): 4 TABLET ORAL at 06:38

## 2020-03-13 RX ADMIN — INSULIN GLARGINE 16 UNIT(S): 100 INJECTION, SOLUTION SUBCUTANEOUS at 21:55

## 2020-03-13 RX ADMIN — LIDOCAINE 1 PATCH: 4 CREAM TOPICAL at 21:54

## 2020-03-13 RX ADMIN — RISPERIDONE 1 MILLIGRAM(S): 4 TABLET ORAL at 17:54

## 2020-03-13 RX ADMIN — AMLODIPINE BESYLATE 5 MILLIGRAM(S): 2.5 TABLET ORAL at 06:38

## 2020-03-13 RX ADMIN — BUDESONIDE AND FORMOTEROL FUMARATE DIHYDRATE 2 PUFF(S): 160; 4.5 AEROSOL RESPIRATORY (INHALATION) at 09:50

## 2020-03-13 RX ADMIN — BUDESONIDE AND FORMOTEROL FUMARATE DIHYDRATE 2 PUFF(S): 160; 4.5 AEROSOL RESPIRATORY (INHALATION) at 21:55

## 2020-03-13 RX ADMIN — Medication 6 UNIT(S): at 11:49

## 2020-03-13 RX ADMIN — AZITHROMYCIN 255 MILLIGRAM(S): 500 TABLET, FILM COATED ORAL at 17:54

## 2020-03-13 RX ADMIN — SEVELAMER CARBONATE 800 MILLIGRAM(S): 2400 POWDER, FOR SUSPENSION ORAL at 11:48

## 2020-03-13 RX ADMIN — LIDOCAINE 1 PATCH: 4 CREAM TOPICAL at 18:57

## 2020-03-13 RX ADMIN — SEVELAMER CARBONATE 800 MILLIGRAM(S): 2400 POWDER, FOR SUSPENSION ORAL at 08:01

## 2020-03-13 RX ADMIN — CHLORHEXIDINE GLUCONATE 1 APPLICATION(S): 213 SOLUTION TOPICAL at 06:41

## 2020-03-13 RX ADMIN — CLOPIDOGREL BISULFATE 75 MILLIGRAM(S): 75 TABLET, FILM COATED ORAL at 11:48

## 2020-03-13 RX ADMIN — Medication 25 MILLIGRAM(S): at 17:53

## 2020-03-13 RX ADMIN — PANTOPRAZOLE SODIUM 40 MILLIGRAM(S): 20 TABLET, DELAYED RELEASE ORAL at 06:38

## 2020-03-13 NOTE — PROGRESS NOTE ADULT - ATTENDING COMMENTS
Patient was seen and examined by myself. Case was discussed with house staff in details. I have reviewed and agree with the plan as outlined above with edits where appropriate.    Vital Signs Last 24 Hrs  T(C): 36.7 (13 Mar 2020 20:48), Max: 36.7 (13 Mar 2020 14:18)  T(F): 98.1 (13 Mar 2020 20:48), Max: 98.1 (13 Mar 2020 14:18)  HR: 71 (13 Mar 2020 20:48) (67 - 86)  BP: 137/76 (13 Mar 2020 20:48) (137/76 - 157/89)  RR: 17 (13 Mar 2020 20:48) (16 - 17)  SpO2: 100% (13 Mar 2020 20:48) (94% - 100%)                          11.5   7.20  )-----------( 246      ( 12 Mar 2020 13:26 )             36.8     03-12    133<L>  |  94<L>  |  65<H>  ----------------------------<  131<H>  4.6   |  28  |  8.12<H>    Ca    11.2<H>      12 Mar 2020 13:26  Phos  8.6     03-12  Mg     2.6     03-12    TPro  8.1  /  Alb  3.1<L>  /  TBili  0.4  /  DBili  x   /  AST  19  /  ALT  20  /  AlkPhos  155<H>  03-12      A/P: 40 y/o M with multiple medical history currently hospitalized for   1. Acute hypoxic respiratory failure presumed due to volume overload  2. ESRD on HD  3. COPD with exacerbation  4. Volume overload  5. MOOSE bacterial PNA  6. Morbid obesity  7. Medical non compliance  8. Bipolar disorder  9. HTN  10. possible opioid use disorder    clinically improved.  Tolerated dialysis  Continues to request for Pain meds- opioids  Complete 7 day course of antibiotics  Ongoing counseling on medical compliance  Discharge planning- stable for discharge likely after dialysis in am
Patient was seen and examined by myself. Case was discussed with house staff in details. I have reviewed and agree with the plan as outlined above with edits where appropriate.    Vital Signs Last 24 Hrs  T(C): 36.8 (12 Mar 2020 21:53), Max: 36.8 (12 Mar 2020 13:11)  T(F): 98.3 (12 Mar 2020 21:53), Max: 98.3 (12 Mar 2020 21:53)  HR: 74 (12 Mar 2020 21:53) (66 - 80)  BP: 152/90 (12 Mar 2020 21:53) (135/78 - 175/103)  RR: 17 (12 Mar 2020 21:53) (16 - 18)  SpO2: 99% (12 Mar 2020 21:53) (96% - 99%)                          11.5   7.20  )-----------( 246      ( 12 Mar 2020 13:26 )             36.8       03-12    133<L>  |  94<L>  |  65<H>  ----------------------------<  131<H>  4.6   |  28  |  8.12<H>    Ca    11.2<H>      12 Mar 2020 13:26  Phos  8.6     03-12  Mg     2.6     03-12    TPro  8.1  /  Alb  3.1<L>  /  TBili  0.4  /  DBili  x   /  AST  19  /  ALT  20  /  AlkPhos  155<H>  03-12    A/P: 38 y/o M with     1. Acute hypoxic respiratory failure   2. ESRD on HD  3. COPD with exacerbation  4. Volume overload  5. MOOSE PNA  6. Morbid obesity  7. Medical non compliance  8. Bipolar disorder  9. HTN      Clinically improving   Continue antibiotics  Dietary and medical compliance counselling  Weight loss and exercise.  Other plan as outlined above

## 2020-03-13 NOTE — DISCHARGE NOTE PROVIDER - NSDCMRMEDTOKEN_GEN_ALL_CORE_FT
acetaminophen 325 mg oral tablet: 2 tab(s) orally every 6 hours, As needed, Temp greater or equal to 38C (100.4F), Mild Pain (1 - 3)  amLODIPine 5 mg oral tablet: 1 tab(s) orally once a day  ascorbic acid 500 mg oral tablet: 1 tab(s) orally once a day  azithromycin 500 mg oral tablet: 1 tab(s) orally once a day   cefuroxime 250 mg oral tablet: 1 tab(s) orally 2 times a day   clopidogrel 75 mg oral tablet: 1 tab(s) orally once a day  folic acid 1 mg oral tablet: 1 tab(s) orally once a day  gabapentin 300 mg oral capsule: 1 cap(s) orally once a day (at bedtime)  HumaLOG 100 units/mL subcutaneous solution: 5 unit(s) subcutaneous 3 times a day  hydrALAZINE 25 mg oral tablet: 1 tab(s) orally 3 times a day  ipratropium-albuterol 0.5 mg-2.5 mg/3 mLinhalation solution: 3 milliliter(s) inhaled every 8 hours  NOTE: last dispensed June 2019   Lantus 100 units/mL subcutaneous solution: 15 unit(s) subcutaneous once a day (at bedtime)  lidocaine 5% topical film: Apply topically to affected area once a day   montelukast 10 mg oral tablet: 1 tab(s) orally once a day  pantoprazole 40 mg oral delayed release tablet: 1 tab(s) orally once a day (before a meal)  PARoxetine 20 mg oral tablet: 1 tab(s) orally once a day  risperiDONE 1 mg oral tablet: 1 tab(s) orally every 12 hours  senna oral tablet: 2 tab(s) orally once a day (at bedtime)  sevelamer carbonate 800 mg oral tablet: 1 tab(s) orally 3 times a day (with meals)  simvastatin 40 mg oral tablet: 1 tab(s) orally once a day (at bedtime)  Symbicort 160 mcg-4.5 mcg/inh inhalation aerosol: 2 puff(s) inhaled 2 times a day acetaminophen 325 mg oral tablet: 2 tab(s) orally every 6 hours, As needed, Temp greater or equal to 38C (100.4F), Mild Pain (1 - 3)  amLODIPine 5 mg oral tablet: 1 tab(s) orally once a day  ascorbic acid 500 mg oral tablet: 1 tab(s) orally once a day  azithromycin 500 mg oral tablet: 1 tab(s) orally once a day   cefuroxime 250 mg oral tablet: 1 tab(s) orally 2 times a day   cefuroxime 250 mg oral tablet: 1 tab(s) orally 2 times a day   clopidogrel 75 mg oral tablet: 1 tab(s) orally once a day  folic acid 1 mg oral tablet: 1 tab(s) orally once a day  gabapentin 300 mg oral capsule: 1 cap(s) orally once a day (at bedtime)  HumaLOG 100 units/mL subcutaneous solution: 5 unit(s) subcutaneous 3 times a day  hydrALAZINE 25 mg oral tablet: 1 tab(s) orally 3 times a day  ipratropium-albuterol 0.5 mg-2.5 mg/3 mLinhalation solution: 3 milliliter(s) inhaled every 8 hours  NOTE: last dispensed June 2019   Lantus 100 units/mL subcutaneous solution: 12 unit(s) subcutaneous once a day (at bedtime)  lidocaine 5% topical film: Apply topically to affected area once a day   montelukast 10 mg oral tablet: 1 tab(s) orally once a day  pantoprazole 40 mg oral delayed release tablet: 1 tab(s) orally once a day (before a meal)  PARoxetine 20 mg oral tablet: 1 tab(s) orally once a day  risperiDONE 1 mg oral tablet: 1 tab(s) orally every 12 hours  senna oral tablet: 2 tab(s) orally once a day (at bedtime)  sevelamer carbonate 800 mg oral tablet: 1 tab(s) orally 3 times a day (with meals)  simvastatin 40 mg oral tablet: 1 tab(s) orally once a day (at bedtime)  Symbicort 160 mcg-4.5 mcg/inh inhalation aerosol: 2 puff(s) inhaled 2 times a day  traMADol 50 mg oral tablet: 1 tab(s) orally every 6 hours, As Needed -Moderate Pain (4 - 6) MDD:4 ttablets acetaminophen 325 mg oral tablet: 2 tab(s) orally every 6 hours, As needed, Temp greater or equal to 38C (100.4F), Mild Pain (1 - 3)  amLODIPine 5 mg oral tablet: 1 tab(s) orally once a day  ascorbic acid 500 mg oral tablet: 1 tab(s) orally once a day  azithromycin 500 mg oral tablet: 1 tab(s) orally once a day   cefuroxime 250 mg oral tablet: 1 tab(s) orally 2 times a day   clopidogrel 75 mg oral tablet: 1 tab(s) orally once a day  folic acid 1 mg oral tablet: 1 tab(s) orally once a day  gabapentin 300 mg oral capsule: 1 cap(s) orally once a day (at bedtime)  HumaLOG 100 units/mL subcutaneous solution: 5 unit(s) subcutaneous 3 times a day  hydrALAZINE 25 mg oral tablet: 1 tab(s) orally 3 times a day  ipratropium-albuterol 0.5 mg-2.5 mg/3 mLinhalation solution: 3 milliliter(s) inhaled every 8 hours  NOTE: last dispensed June 2019   Lantus 100 units/mL subcutaneous solution: 12 unit(s) subcutaneous once a day (at bedtime)  lidocaine 5% topical film: Apply topically to affected area once a day   montelukast 10 mg oral tablet: 1 tab(s) orally once a day  pantoprazole 40 mg oral delayed release tablet: 1 tab(s) orally once a day (before a meal)  PARoxetine 20 mg oral tablet: 1 tab(s) orally once a day  risperiDONE 1 mg oral tablet: 1 tab(s) orally every 12 hours  senna oral tablet: 2 tab(s) orally once a day (at bedtime)  sevelamer carbonate 800 mg oral tablet: 1 tab(s) orally 3 times a day (with meals)  simvastatin 40 mg oral tablet: 1 tab(s) orally once a day (at bedtime)  Symbicort 160 mcg-4.5 mcg/inh inhalation aerosol: 2 puff(s) inhaled 2 times a day  traMADol 50 mg oral tablet: 1 tab(s) orally every 6 hours, As Needed -Moderate Pain (4 - 6) MDD:4 ttablets

## 2020-03-13 NOTE — PROGRESS NOTE ADULT - SUBJECTIVE AND OBJECTIVE BOX
PGY 2 Note discussed with primary attending.    Patient is a 39y old  Male who presents with a chief complaint of chest pain (13 Mar 2020 12:58)      INTERVAL HPI/OVERNIGHT EVENTS: patient assessed bedside, reported pain on left side of chest worse with movement with improvement s/p morphine overnight. Increased dose of tramadol with relief, however continues to report pain now though appears to be laying comfortably in bed.     MEDICATIONS  (STANDING):  acetaminophen  IVPB .. 1000 milliGRAM(s) IV Intermittent once  amLODIPine   Tablet 5 milliGRAM(s) Oral daily  azithromycin  IVPB 500 milliGRAM(s) IV Intermittent every 24 hours  azithromycin  IVPB      budesonide 160 MICROgram(s)/formoterol 4.5 MICROgram(s) Inhaler 2 Puff(s) Inhalation two times a day  cefTRIAXone   IVPB      cefTRIAXone   IVPB 1000 milliGRAM(s) IV Intermittent every 24 hours  chlorhexidine 2% Cloths 1 Application(s) Topical <User Schedule>  clopidogrel Tablet 75 milliGRAM(s) Oral daily  dextrose 50% Injectable 25 Gram(s) IV Push once  gabapentin 300 milliGRAM(s) Oral at bedtime  heparin  Injectable 5000 Unit(s) SubCutaneous every 12 hours  hydrALAZINE 25 milliGRAM(s) Oral three times a day  insulin glargine Injectable (LANTUS) 16 Unit(s) SubCutaneous at bedtime  insulin lispro (HumaLOG) corrective regimen sliding scale   SubCutaneous Before meals and at bedtime  insulin lispro Injectable (HumaLOG) 6 Unit(s) SubCutaneous three times a day before meals  lidocaine   Patch 1 Patch Transdermal daily  montelukast 10 milliGRAM(s) Oral daily  pantoprazole    Tablet 40 milliGRAM(s) Oral before breakfast  PARoxetine 20 milliGRAM(s) Oral daily  risperiDONE   Tablet 1 milliGRAM(s) Oral every 12 hours  sevelamer carbonate 800 milliGRAM(s) Oral three times a day with meals  simvastatin 40 milliGRAM(s) Oral at bedtime    MEDICATIONS  (PRN):  acetaminophen   Tablet .. 650 milliGRAM(s) Oral every 6 hours PRN Temp greater or equal to 38C (100.4F), Mild Pain (1 - 3)  ALBUTerol    90 MICROgram(s) HFA Inhaler 2 Puff(s) Inhalation every 6 hours PRN Bronchospasm  traMADol 50 milliGRAM(s) Oral every 8 hours PRN Moderate Pain (4 - 6)      Allergies    aspirin (Swelling)  fish (Unknown)  penicillins (Swelling)  shellfish (Unknown)    Intolerances      REVIEW OF SYSTEMS:  CONSTITUTIONAL: No fever, or fatigue  RESPIRATORY: Cough, no wheezing, chills or hemoptysis; No shortness of breath  CARDIOVASCULAR: Chest wall pain, no palpitations, dizziness, or leg swelling  GASTROINTESTINAL: No abdominal or epigastric pain. No nausea, vomiting, or hematemesis; No diarrhea or constipation. No melena or hematochezia.  NEUROLOGICAL: No headaches, memory loss, loss of strength, numbness, or tremors  SKIN: No itching, burning, rashes, or lesions       Vital Signs Last 24 Hrs  T(C): 36.7 (13 Mar 2020 14:18), Max: 36.8 (12 Mar 2020 21:53)  T(F): 98.1 (13 Mar 2020 14:18), Max: 98.3 (12 Mar 2020 21:53)  HR: 86 (13 Mar 2020 14:18) (67 - 86)  BP: 155/84 (13 Mar 2020 14:18) (136/76 - 157/89)  BP(mean): --  RR: 17 (13 Mar 2020 14:18) (16 - 18)  SpO2: 94% (13 Mar 2020 14:18) (94% - 99%)    PHYSICAL EXAM:  GENERAL: NAD, well-groomed, well-developed, obese, on NC  HEAD:  Atraumatic, Normocephalic  EYES: EOMI, PERRLA, conjunctiva and sclera clear  NECK: Supple, No JVD, Normal thyroid  CHEST/LUNG: BLAE+, occasional wheezes and rhonchi appreciated over left upper and middle zones  HEART: Regular rate and rhythm; No murmurs, rubs, or gallops  ABDOMEN: Soft, Nontender, Nondistended; Bowel sounds present  NERVOUS SYSTEM:  Alert & Oriented X3, Good concentration; Motor Strength 5/5 B/L   EXTREMITIES:  2+ Peripheral Pulses, No clubbing, cyanosis, or edema  SKIN: stasis dermatitis on BLE    LABS:                        11.5   7.20  )-----------( 246      ( 12 Mar 2020 13:26 )             36.8     03-12    133<L>  |  94<L>  |  65<H>  ----------------------------<  131<H>  4.6   |  28  |  8.12<H>    Ca    11.2<H>      12 Mar 2020 13:26  Phos  8.6     03-12  Mg     2.6     03-12    TPro  8.1  /  Alb  3.1<L>  /  TBili  0.4  /  DBili  x   /  AST  19  /  ALT  20  /  AlkPhos  155<H>  03-12        CAPILLARY BLOOD GLUCOSE      POCT Blood Glucose.: 143 mg/dL (13 Mar 2020 11:24)  POCT Blood Glucose.: 100 mg/dL (13 Mar 2020 07:30)  POCT Blood Glucose.: 98 mg/dL (12 Mar 2020 21:45)  POCT Blood Glucose.: 148 mg/dL (12 Mar 2020 16:38)

## 2020-03-13 NOTE — DISCHARGE NOTE PROVIDER - HOSPITAL COURSE
40 y/o morbidly obese male from home PMH ESRD (on MWF HD via LUE AVF at [facility] w/ Dr La), HTN, IDDM, Substance abuse in the past, COPD (former smoker, on home oxygen 2L), Bipolar disorder, and Schizophrenia p/w atypical chest pain - occurred this AM during HD, s/p 2 hours (out of usual 5 hour), began on the R side as focal and sharp w/ radiation to L chest, unrelieved w/ rest, and persistent. Otherwise denied any Fever, changes in dyspnea, abdominal pain, NVDC, urinary changes, or any other complaints. Denies any cough, shortness of breath. Presently denies any complain.     Admitted to medicine floor for noted CT chest finding of Left upper lobe airspace consolidation compatible with pneumonia in the appropriate clinical setting. Patient on presentation was afebrile with no leucocytosis, later confirmed having cough with occasional phlegm, denies fevers or chills, recent travel or sick contacts. Started on coverage for CAP with Rocephin and Azithro. Also had costochondritis on left side, pain managed with tylenol, lidocaine, tramadol and warm compresses for costochondritis.    Patient's oxygen saturation checked on room air: saturating at 94%, rechecked and stable after 20minutes. Reports mild dizziness off O2, explained likely from psychological effect as he has been on oxygen supplementation for a long time now. Discussed risks vs benefits of O2 supplementation as well as his non qualification given he is saturating over 88% on RA. Discussed possible discharge this afternoon, patient agrees with plan. Informed CM and SW.     Clinically stable discharge home as discussed with attending, to complete course of antibiotics  for pneumonia. 38 y/o morbidly obese male from home PMH ESRD (on MWF HD via LUE [facility] at [facility] w/ Dr La), HTN, IDDM, Substance abuse in the past, COPD (former smoker, on home oxygen 2L), Bipolar disorder, and Schizophrenia p/w atypical chest pain - occurred this AM during HD, s/p 2 hours (out of usual 5 hour), began on the R side as focal and sharp w/ radiation to L chest, unrelieved w/ rest, and persistent. Otherwise denied any Fever, changes in dyspnea, abdominal pain, NVDC, urinary changes, or any other complaints. Denies any cough, shortness of breath. Presently denies any complain.     Admitted to medicine floor for noted CT chest finding of Left upper lobe airspace consolidation compatible with pneumonia in the appropriate clinical setting. Patient on presentation was afebrile with no leucocytosis, later confirmed having cough with occasional phlegm, denies fevers or chills, recent travel or sick contacts. Started on coverage for CAP with Rocephin and Azithro. Also had costochondritis on left side, pain managed with tylenol, lidocaine, tramadol and warm compresses for costochondritis.    Patient's oxygen saturation checked on room air: saturating at 94%, rechecked and stable after 20minutes. Reports mild dizziness off O2, explained likely from psychological effect as he has been on oxygen supplementation for a long time now. Discussed risks vs benefits of O2 supplementation as well as his non qualification given he is saturating over 88% on RA. Discussed possible discharge this afternoon, patient agrees with plan. Informed CM and SW.     Clinically stable  for discharge home as discussed with attending, to complete course of antibiotics  for pneumonia. 38 y/o morbidly obese male from home PMH ESRD (on MWF HD via L[facility] at [facility] w/ Dr La), HTN, IDDM, Substance abuse in the past, COPD (former smoker, on home oxygen 2L), Bipolar disorder, and Schizophrenia p/w atypical chest pain - occurred this AM during HD, s/p 2 hours (out of usual 5 hour), began on the R side as focal and sharp w/ radiation to L chest, unrelieved w/ rest, and persistent. Otherwise denied any Fever, changes in dyspnea, abdominal pain, NVDC, urinary changes, or any other complaints. Denies any cough, shortness of breath. Presently denies any complain.     Admitted to medicine floor for noted CT chest finding of Left upper lobe airspace consolidation compatible with pneumonia in the appropriate clinical setting. Patient on presentation was afebrile with no leucocytosis, later confirmed having cough with occasional phlegm, denies fevers or chills, recent travel or sick contacts. Started on coverage for CAP with Rocephin and Azithro. Also had costochondritis on left side, pain managed with tylenol, lidocaine, tramadol and warm compresses for costochondritis.    Patient's oxygen saturation checked on room air: saturating at 94%, rechecked and stable after 20minutes. Reports mild dizziness off O2, explained likely from psychological effect as he has been on oxygen supplementation for a long time now. Discussed risks vs benefits of O2 supplementation as well as his non qualification given he is saturating over 88% on RA. Discussed possible discharge this afternoon, patient agrees with plan. Informed CM and SW.     Clinically stable  for discharge home as discussed with attending, to complete course of antibiotics  for pneumonia.                 ATTENDING ADDENDUM: 3/17/2020    The final discharge diagnoses include      1. MOOSE bacterial PNA presumed gram negative    2. COPD with exacerbation - initial concern for acute respiratory failure due to PNA and volume overload related to incomplete dialysis session- symptoms resolved immediately; Acute     respiratory failure unlikely    3. ESRD on HD    4. Morbid obesity    5. Medical non compliance    6. Bipolar disorder

## 2020-03-13 NOTE — DISCHARGE NOTE PROVIDER - NSDCFUSCHEDAPPT_GEN_ALL_CORE_FT
PRESTON JOHNSON ; 05/01/2020 ; [facility] [address] [address]  PRESTON JOHNSON ; 05/01/2020 ; [facility] [address] [address]

## 2020-03-13 NOTE — PROGRESS NOTE ADULT - SUBJECTIVE AND OBJECTIVE BOX
Time of Visit:  Patient seen and examined.     MEDICATIONS  (STANDING):  amLODIPine   Tablet 5 milliGRAM(s) Oral daily  azithromycin  IVPB 500 milliGRAM(s) IV Intermittent every 24 hours  azithromycin  IVPB      budesonide 160 MICROgram(s)/formoterol 4.5 MICROgram(s) Inhaler 2 Puff(s) Inhalation two times a day  cefTRIAXone   IVPB      cefTRIAXone   IVPB 1000 milliGRAM(s) IV Intermittent every 24 hours  chlorhexidine 2% Cloths 1 Application(s) Topical <User Schedule>  clopidogrel Tablet 75 milliGRAM(s) Oral daily  dextrose 50% Injectable 25 Gram(s) IV Push once  gabapentin 300 milliGRAM(s) Oral at bedtime  heparin  Injectable 5000 Unit(s) SubCutaneous every 12 hours  hydrALAZINE 25 milliGRAM(s) Oral three times a day  insulin glargine Injectable (LANTUS) 16 Unit(s) SubCutaneous at bedtime  insulin lispro (HumaLOG) corrective regimen sliding scale   SubCutaneous Before meals and at bedtime  insulin lispro Injectable (HumaLOG) 6 Unit(s) SubCutaneous three times a day before meals  lidocaine   Patch 1 Patch Transdermal daily  montelukast 10 milliGRAM(s) Oral daily  pantoprazole    Tablet 40 milliGRAM(s) Oral before breakfast  PARoxetine 20 milliGRAM(s) Oral daily  risperiDONE   Tablet 1 milliGRAM(s) Oral every 12 hours  sevelamer carbonate 800 milliGRAM(s) Oral three times a day with meals  simvastatin 40 milliGRAM(s) Oral at bedtime      MEDICATIONS  (PRN):  acetaminophen   Tablet .. 650 milliGRAM(s) Oral every 6 hours PRN Temp greater or equal to 38C (100.4F), Mild Pain (1 - 3)  ALBUTerol    90 MICROgram(s) HFA Inhaler 2 Puff(s) Inhalation every 6 hours PRN Bronchospasm  traMADol 50 milliGRAM(s) Oral every 8 hours PRN Moderate Pain (4 - 6)       Medications up to date at time of exam.      PHYSICAL EXAMINATION:  Patient has no new complaints.  GENERAL: The patient is a well-developed, well-nourished, in no apparent distress.     Vital Signs Last 24 Hrs  T(C): 36.7 (13 Mar 2020 14:18), Max: 36.8 (12 Mar 2020 21:53)  T(F): 98.1 (13 Mar 2020 14:18), Max: 98.3 (12 Mar 2020 21:53)  HR: 86 (13 Mar 2020 14:18) (67 - 86)  BP: 155/84 (13 Mar 2020 14:18) (152/90 - 157/89)  BP(mean): --  RR: 17 (13 Mar 2020 14:18) (16 - 17)  SpO2: 94% (13 Mar 2020 14:18) (94% - 99%)   (if applicable)    Chest Tube (if applicable)    HEENT: Head is normocephalic and atraumatic. Extraocular muscles are intact. Mucous membranes are moist.     NECK: Supple, no palpable adenopathy.    LUNGS: Clear to auscultation, no wheezing, rales, or rhonchi.    HEART: Regular rate and rhythm without murmur.    ABDOMEN: Soft, nontender, and nondistended.  No hepatosplenomegaly is noted.    : No painful voiding, no pelvic pain    EXTREMITIES: + 1 b/l lower ext edema with chronic skin changes    NEUROLOGIC: Awake, alert, oriented, grossly intact    SKIN: Warm, dry, good turgor.      LABS:                        11.5   7.20  )-----------( 246      ( 12 Mar 2020 13:26 )             36.8     03-12    133<L>  |  94<L>  |  65<H>  ----------------------------<  131<H>  4.6   |  28  |  8.12<H>    Ca    11.2<H>      12 Mar 2020 13:26  Phos  8.6     03-12  Mg     2.6     03-12    TPro  8.1  /  Alb  3.1<L>  /  TBili  0.4  /  DBili  x   /  AST  19  /  ALT  20  /  AlkPhos  155<H>  03-12                        MICROBIOLOGY: (if applicable)    RADIOLOGY & ADDITIONAL STUDIES:  EKG:   CXR:  ECHO:    IMPRESSION: 39y Male PAST MEDICAL & SURGICAL HISTORY:  COPD (chronic obstructive pulmonary disease)  Migraine  HTN (hypertension)  DM (diabetes mellitus)  Bipolar disorder  Schizophrenia  ESRD on dialysis  Morbid obesity with BMI of 40.0-44.9, adult  S/P arteriovenous (AV) fistula creation  H/O hernia repair   p/w         Impression;  This is a 39 yr old man with morbid obesity , chronic hypoxic resp failure due to COPD requiring supp O2 @ 2 LPM and prior mentioned medical condition presented with chest pain due to MOOSE pna.     Suggestion ;  -continue antibx  -f/u cultures  -OOB to chair   -O2 supp  -duonebs   -blood sugar control  -dialysis as per renal  -dvt/gi proph

## 2020-03-13 NOTE — DISCHARGE NOTE PROVIDER - NSDCCPCAREPLAN_GEN_ALL_CORE_FT
PRINCIPAL DISCHARGE DIAGNOSIS  Diagnosis: Pneumonia  Assessment and Plan of Treatment: You presented with left sided chest pain and cough with noted pneumonia on left side on your CT scan. You were started on antibiotics, and are recommended to continue as prescribed to complete course. Please follow up with Dr Coburn to inform of your recent admisison.      SECONDARY DISCHARGE DIAGNOSES  Diagnosis: Atypical chest pain  Assessment and Plan of Treatment: You presented with chest pain which was worked up with no noted ekg changes or blood work to suggest cardiac cause. Your pain was found to be form costochondritis associated with your cough. Continue pain medications as prescribed and follow up with your primary doctor.    Diagnosis: COPD (chronic obstructive pulmonary disease)  Assessment and Plan of Treatment: Your copd was stable on admission. You were evaluated for need for oxygen and have oxygen levels maintaining around 94% on room air indicating you longer need oxygen supplementation. Please follow up with your primary doctor.    Diagnosis: ESRD on dialysis  Assessment and Plan of Treatment: You had a session of dialysis on 3/12 and are recommended to continue sessions, next being on Saturday as discussed with Dr La. Continue your renal medications as prescribed.    Diagnosis: DM (diabetes mellitus)  Assessment and Plan of Treatment: Your hemoglobin A1C was 4.8 on admission indicating very tight contorl of your diabetes. Your insulin dose has been decrease dto 14u humalog and 5u premeal three times a day of humalog. You are recommended to monitor your blood sugars daily and maintain a log. Please follow up with your primary doctor in 1 week to monitor your progress. You are also recommended diet consistent in carbohydrates and low cholesterol. You are recommended exercise as tolerated at least three times a week.    Diagnosis: HTN (hypertension)  Assessment and Plan of Treatment: Your blood pressure was stable while in hospital Cotninue your home medications as prescribed.    Diagnosis: Bipolar disorder  Assessment and Plan of Treatment: Please continue home medications. PRINCIPAL DISCHARGE DIAGNOSIS  Diagnosis: Pneumonia  Assessment and Plan of Treatment: You presented with left sided chest pain and cough with noted pneumonia on left side on your CT scan. You were started on antibiotics, and are recommended to continue as prescribed to complete course. Please follow up with Dr Coburn to inform of your recent admisison.      SECONDARY DISCHARGE DIAGNOSES  Diagnosis: Atypical chest pain  Assessment and Plan of Treatment: You presented with chest pain which was worked up with no noted ekg changes or blood work to suggest cardiac cause. Your pain was found to be form costochondritis associated with your cough. Continue pain medications as prescribed and follow up with your primary doctor.    Diagnosis: COPD (chronic obstructive pulmonary disease)  Assessment and Plan of Treatment: Your copd was stable on admission. You were evaluated for need for oxygen and have oxygen levels maintaining around 94% on room air indicating you longer need oxygen supplementation. Please follow up with your primary doctor.    Diagnosis: ESRD on dialysis  Assessment and Plan of Treatment: You had a session of dialysis on 3/12 and are recommended to continue sessions, next being on Saturday as discussed with Dr La. Continue your renal medications as prescribed.    Diagnosis: DM (diabetes mellitus)  Assessment and Plan of Treatment: Your hemoglobin A1C was 4.8 on admission indicating very tight contorl of your diabetes. Your insulin dose has been decreased to [id number] at night  premeal three times a day of humalog. You are recommended to monitor your blood sugars daily and maintain a log. Please follow up with your primary doctor in 1 week to monitor your progress. You are also recommended diet consistent in carbohydrates and low cholesterol. You are recommended exercise as tolerated at least three times a week.    Diagnosis: HTN (hypertension)  Assessment and Plan of Treatment: Your blood pressure was stable while in hospital Cotninue your home medications as prescribed.    Diagnosis: Bipolar disorder  Assessment and Plan of Treatment: Please continue home medications.

## 2020-03-13 NOTE — PROGRESS NOTE ADULT - ASSESSMENT
A/P    ESRD  ON  HD  FOR HD  TOMORROW  UF  GOAL 3-4 LIT   HB GOOD,  EPO  ON  HOLD     BP IS OK  ADV TO LIMIT  FLUID INTAKE,  FREQ  HAS  HIGH IDWG    ADMITTED  WITH CH  PAIN,  ACS  RULED OUT    BEING TREATED  FOR PNEUMONIA-  ON  CEFTRIAXONE  AND AZITHROMYCIN    FOR  D/C  HOME  AFTER  HD IN  AM

## 2020-03-13 NOTE — PROGRESS NOTE ADULT - ASSESSMENT
40 y/o morbidly obese male from home PMH ESRD (on MWF HD via LUE AVF at [facility] w/ Dr La), HTN, IDDM, Substance abuse in the past, COPD (former smoker, on home oxygen 2L), Bipolar disorder, and Schizophrenia p/w atypical chest pain - occurred this AM during HD, s/p 2 hours (out of usual 5 hour), began on the R side as focal and sharp w/ radiation to L chest, unrelieved w/ rest, and persistent. Otherwise denied any Fever, changes in dyspnea, abdominal pain, NVDC, urinary changes, or any other complaints. Denies any cough, shortness of breath. Presently denies any complain.     Admitted with PNA

## 2020-03-13 NOTE — DISCHARGE NOTE PROVIDER - CARE PROVIDER_API CALL
Eliceo Coburn)  Family Medicine  [address] [place] [address]  Phone: [phone]  Fax: [phone]  Follow Up Time: 1-3 days Eliceo Coburn)  Family Medicine  [address] [place] [address]  Phone: [phone]  Fax: [phone]  Follow Up Time: 1-3 days

## 2020-03-13 NOTE — PROGRESS NOTE ADULT - SUBJECTIVE AND OBJECTIVE BOX
Patient is a 39y Male with  CHEST PAIN  WAS SEEN  EARLIER  TODAY   HAD NO  COMPLAINTS AT T HE TIME  OF  EXAM  WAS  LYING COMFORTABLY  ON  BED  HAD  HD  YESTERDAY, DUE  FOR HD TOMORROW    aspirin (Swelling)  fish (Unknown)  penicillins (Swelling)  shellfish (Unknown)    Hospital Medications:   MEDICATIONS  (STANDING):  amLODIPine   Tablet 5 milliGRAM(s) Oral daily  azithromycin  IVPB 500 milliGRAM(s) IV Intermittent every 24 hours  azithromycin  IVPB      budesonide 160 MICROgram(s)/formoterol 4.5 MICROgram(s) Inhaler 2 Puff(s) Inhalation two times a day  cefTRIAXone   IVPB      cefTRIAXone   IVPB 1000 milliGRAM(s) IV Intermittent every 24 hours  chlorhexidine 2% Cloths 1 Application(s) Topical <User Schedule>  clopidogrel Tablet 75 milliGRAM(s) Oral daily  dextrose 50% Injectable 25 Gram(s) IV Push once  gabapentin 300 milliGRAM(s) Oral at bedtime  heparin  Injectable 5000 Unit(s) SubCutaneous every 12 hours  hydrALAZINE 25 milliGRAM(s) Oral three times a day  insulin glargine Injectable (LANTUS) 16 Unit(s) SubCutaneous at bedtime  insulin lispro (HumaLOG) corrective regimen sliding scale   SubCutaneous Before meals and at bedtime  insulin lispro Injectable (HumaLOG) 6 Unit(s) SubCutaneous three times a day before meals  lidocaine   Patch 1 Patch Transdermal daily  montelukast 10 milliGRAM(s) Oral daily  pantoprazole    Tablet 40 milliGRAM(s) Oral before breakfast  PARoxetine 20 milliGRAM(s) Oral daily  risperiDONE   Tablet 1 milliGRAM(s) Oral every 12 hours  sevelamer carbonate 800 milliGRAM(s) Oral three times a day with meals  simvastatin 40 milliGRAM(s) Oral at bedtime    REVIEW OF SYSTEMS:  HAS NO   FEVER  CHILLS   NO   SOB ,  COUGH IS BETTER   NO CH  PAIN  / PALPITATIONS   APPETITE IS  GOOD, NO  N/V  OR  ABD PAIN  HAS LITTLE URINE OUTPUT   NO LEG  PAIN    VITALS:  T(F): 98.1 (03-13-20 @ 14:18), Max: 98.3 (03-12-20 @ 21:53)  HR: 86 (03-13-20 @ 14:18)  BP: 155/84 (03-13-20 @ 14:18)  RR: 17 (03-13-20 @ 14:18)  SpO2: 94% (03-13-20 @ 14:18)  Wt(kg): --    03-12 @ 07:01  -  03-13 @ 07:00  --------------------------------------------------------  IN: 400 mL / OUT: 4000 mL / NET: -3600 mL        PHYSICAL EXAM:  Constitutional: NAD  HEENT: CONJ PINK  Neck: No JVD  Respiratory: CTAB, no wheezes, rales or rhonchi  Cardiovascular: S1, S2, RRR  Gastrointestinal: BS+, soft, NT/ND  Extremities: No cyanosis or clubbing. No peripheral edema  Neurological: A/O x 3, no focal deficits  :  No hernandez.   s  Vascular Access:AVF L UPPER ARM  WORKING  WELL    LABS:  03-12    133<L>  |  94<L>  |  65<H>  ----------------------------<  131<H>  4.6   |  28  |  8.12<H>    Ca    11.2<H>      12 Mar 2020 13:26  Phos  8.6     03-12  Mg     2.6     03-12    TPro  8.1  /  Alb  3.1<L>  /  TBili  0.4  /  DBili      /  AST  19  /  ALT  20  /  AlkPhos  155<H>  03-12    Creatinine Trend: 8.12 <--, 6.50 <--                        11.5   7.20  )-----------( 246      ( 12 Mar 2020 13:26 )             36.8     Urine Studies:      RADIOLOGY & ADDITIONAL STUDIES:

## 2020-03-13 NOTE — PROGRESS NOTE ADULT - PROBLEM SELECTOR PLAN 1
-presented with chest pain and productive cough  -CT chest: Left upper lobe airspace consolidation compatible with pneumonia in the appropriate clinical setting.  -Wbc; WNL, Afbrile  -Continue coverage for CAP with Rocephin and Azithro   -Pain management with tylenol, lidocaine and warm compresses for costochondritis in addition to tramadol  -Dc planned for today however pt refused, to get 24 hour notice.

## 2020-03-13 NOTE — PROGRESS NOTE ADULT - PROBLEM SELECTOR PLAN 3
- Home dose; Lantus 20 units and nova log 10 units pre meal   - Will lower dose to 16 u Lantus and 6u pre meal of Humalog as fs too tightly controlled here  - monitor fs achs, f/u a1c

## 2020-03-13 NOTE — PROGRESS NOTE ADULT - PROBLEM SELECTOR PLAN 4
not in exacerbation   - c/w Symbicort with PRN Albuterol ( home meds)  - pt saturating 94% on RA, does not qualify for home o2 anymore

## 2020-03-13 NOTE — DISCHARGE NOTE PROVIDER - ATTENDING COMMENTS
MEDICAL ATTENDING DISCHARGE NOTE :        Patient is a 39y old  Male who presents with a chief complaint of chest pain (14 Mar 2020 13:58)            INTERVAL HPI / OVERNIGHT EVENTS: patient with uneventful night and offers no new complaints        ----------------------------------------------------------------------------------    REVIEW OF SYSTEMS: no fever; no SOB            Vital Signs Last 24 Hrs    T(C): 36.7 (14 Mar 2020 14:22), Max: 36.9 (14 Mar 2020 12:30)    T(F): 98 (14 Mar 2020 14:22), Max: 98.5 (14 Mar 2020 12:30)    HR: 76 (14 Mar 2020 14:22) (71 - 76)    BP: 147/82 (14 Mar 2020 14:22) (130/90 - 163/79)    RR: 17 (14 Mar 2020 14:22) (16 - 18)    SpO2: 93% (14 Mar 2020 14:22) (93% - 100%)        _________________    PHYSICAL EXAM:    ---------------------------     NAD; Normocephalic    LUNGS - no wheezing    HEART: S1 S2+     ABDOMEN: Soft, Nontender, non distended,     EXTREMITIES: no cyanosis;             _________________________________________________    LABS: no new labs            A/P: 40 y/o M with multiple medical history currently hospitalized for     1. Acute hypoxic respiratory failure presumed due to volume overload    2. ESRD on HD    3. COPD with exacerbation    4. Volume overload    5. MOOSE bacterial PNA presumed gram negative    6. Morbid obesity    7. Medical non compliance    8. Bipolar disorder    9. HTN    10. possible opioid use disorder         - clinically improved     - Stable for discharge     - Continue dialysis in the community 3 x weekly.                Plan of care, test results and findings were  discussed with patient ( and HCP &/or primary care giver) ; all questions and concerns were addressed and care was aligned with patient's wishes.    PMD follow up after discharge from the hospital for continued care and outpatient monitoring was advised.    Discharge plans has been  discussed with care team including the house staff, nursing staff  and discharge planners- ( /  .)

## 2020-03-13 NOTE — CHART NOTE - NSCHARTNOTEFT_GEN_A_CORE
Patient's oxygen saturation checked on room air: saturating at 94%, rechecked and stable after 20minutes. Reports mild dizziness off O2, explained likely from psychological effect as he has been on oxygen supplementation for a long time now. Discussed risks vs benefits of O2 supplementation including his no qualification given he is saturating well. Discussed possible discharge later this afternoon, patient agrees with plan. Informed CM and SW. Patient's oxygen saturation checked on room air: saturating at 94%, rechecked and stable after 20minutes. Reports mild dizziness off O2, explained likely from psychological effect as he has been on oxygen supplementation for a long time now. Discussed risks vs benefits of O2 supplementation as well as his non qualification given he is saturating over 88% on RA. Discussed possible discharge this afternoon, patient agrees with plan. Informed CM and SW.

## 2020-03-14 ENCOUNTER — TRANSCRIPTION ENCOUNTER (OUTPATIENT)
Age: 40
End: 2020-03-14

## 2020-03-14 VITALS
OXYGEN SATURATION: 93 % | HEART RATE: 76 BPM | RESPIRATION RATE: 17 BRPM | TEMPERATURE: 98 F | DIASTOLIC BLOOD PRESSURE: 82 MMHG | SYSTOLIC BLOOD PRESSURE: 147 MMHG

## 2020-03-14 LAB
GLUCOSE BLDC GLUCOMTR-MCNC: 82 MG/DL — SIGNIFICANT CHANGE UP (ref 70–99)
GLUCOSE BLDC GLUCOMTR-MCNC: 91 MG/DL — SIGNIFICANT CHANGE UP (ref 70–99)

## 2020-03-14 PROCEDURE — 96376 TX/PRO/DX INJ SAME DRUG ADON: CPT

## 2020-03-14 PROCEDURE — 93005 ELECTROCARDIOGRAM TRACING: CPT

## 2020-03-14 PROCEDURE — 99261: CPT

## 2020-03-14 PROCEDURE — 36415 COLL VENOUS BLD VENIPUNCTURE: CPT

## 2020-03-14 PROCEDURE — 82607 VITAMIN B-12: CPT

## 2020-03-14 PROCEDURE — 82962 GLUCOSE BLOOD TEST: CPT

## 2020-03-14 PROCEDURE — 71045 X-RAY EXAM CHEST 1 VIEW: CPT

## 2020-03-14 PROCEDURE — 85027 COMPLETE CBC AUTOMATED: CPT

## 2020-03-14 PROCEDURE — 94640 AIRWAY INHALATION TREATMENT: CPT

## 2020-03-14 PROCEDURE — 83690 ASSAY OF LIPASE: CPT

## 2020-03-14 PROCEDURE — 84443 ASSAY THYROID STIM HORMONE: CPT

## 2020-03-14 PROCEDURE — 71250 CT THORAX DX C-: CPT

## 2020-03-14 PROCEDURE — 99239 HOSP IP/OBS DSCHRG MGMT >30: CPT

## 2020-03-14 PROCEDURE — 87631 RESP VIRUS 3-5 TARGETS: CPT

## 2020-03-14 PROCEDURE — 82553 CREATINE MB FRACTION: CPT

## 2020-03-14 PROCEDURE — 82746 ASSAY OF FOLIC ACID SERUM: CPT

## 2020-03-14 PROCEDURE — 84484 ASSAY OF TROPONIN QUANT: CPT

## 2020-03-14 PROCEDURE — 83735 ASSAY OF MAGNESIUM: CPT

## 2020-03-14 PROCEDURE — 96374 THER/PROPH/DIAG INJ IV PUSH: CPT

## 2020-03-14 PROCEDURE — 80061 LIPID PANEL: CPT

## 2020-03-14 PROCEDURE — 82306 VITAMIN D 25 HYDROXY: CPT

## 2020-03-14 PROCEDURE — 80053 COMPREHEN METABOLIC PANEL: CPT

## 2020-03-14 PROCEDURE — 84100 ASSAY OF PHOSPHORUS: CPT

## 2020-03-14 PROCEDURE — 99285 EMERGENCY DEPT VISIT HI MDM: CPT | Mod: 25

## 2020-03-14 PROCEDURE — 83036 HEMOGLOBIN GLYCOSYLATED A1C: CPT

## 2020-03-14 PROCEDURE — 82550 ASSAY OF CK (CPK): CPT

## 2020-03-14 RX ORDER — CEFUROXIME AXETIL 250 MG
1 TABLET ORAL
Qty: 10 | Refills: 0
Start: 2020-03-14 | End: 2020-03-18

## 2020-03-14 RX ORDER — INSULIN GLARGINE 100 [IU]/ML
12 INJECTION, SOLUTION SUBCUTANEOUS AT BEDTIME
Refills: 0 | Status: DISCONTINUED | OUTPATIENT
Start: 2020-03-14 | End: 2020-03-14

## 2020-03-14 RX ORDER — INSULIN LISPRO 100/ML
4 VIAL (ML) SUBCUTANEOUS
Refills: 0 | Status: DISCONTINUED | OUTPATIENT
Start: 2020-03-14 | End: 2020-03-14

## 2020-03-14 RX ORDER — ACETAMINOPHEN 500 MG
1000 TABLET ORAL ONCE
Refills: 0 | Status: COMPLETED | OUTPATIENT
Start: 2020-03-14 | End: 2020-03-14

## 2020-03-14 RX ORDER — INSULIN LISPRO 100/ML
5 VIAL (ML) SUBCUTANEOUS
Qty: 0 | Refills: 0 | DISCHARGE

## 2020-03-14 RX ORDER — AZITHROMYCIN 500 MG/1
1 TABLET, FILM COATED ORAL
Qty: 5 | Refills: 0
Start: 2020-03-14 | End: 2020-03-18

## 2020-03-14 RX ORDER — TRAMADOL HYDROCHLORIDE 50 MG/1
1 TABLET ORAL
Qty: 20 | Refills: 0
Start: 2020-03-14 | End: 2020-03-18

## 2020-03-14 RX ORDER — TRAMADOL HYDROCHLORIDE 50 MG/1
50 TABLET ORAL EVERY 6 HOURS
Refills: 0 | Status: DISCONTINUED | OUTPATIENT
Start: 2020-03-14 | End: 2020-03-14

## 2020-03-14 RX ORDER — INSULIN GLARGINE 100 [IU]/ML
15 INJECTION, SOLUTION SUBCUTANEOUS
Qty: 0 | Refills: 0 | DISCHARGE

## 2020-03-14 RX ADMIN — TRAMADOL HYDROCHLORIDE 50 MILLIGRAM(S): 50 TABLET ORAL at 14:35

## 2020-03-14 RX ADMIN — Medication 400 MILLIGRAM(S): at 15:58

## 2020-03-14 RX ADMIN — HEPARIN SODIUM 5000 UNIT(S): 5000 INJECTION INTRAVENOUS; SUBCUTANEOUS at 05:25

## 2020-03-14 RX ADMIN — ALBUTEROL 2 PUFF(S): 90 AEROSOL, METERED ORAL at 13:31

## 2020-03-14 RX ADMIN — Medication 20 MILLIGRAM(S): at 13:32

## 2020-03-14 RX ADMIN — TRAMADOL HYDROCHLORIDE 50 MILLIGRAM(S): 50 TABLET ORAL at 13:30

## 2020-03-14 RX ADMIN — BUDESONIDE AND FORMOTEROL FUMARATE DIHYDRATE 2 PUFF(S): 160; 4.5 AEROSOL RESPIRATORY (INHALATION) at 13:32

## 2020-03-14 RX ADMIN — TRAMADOL HYDROCHLORIDE 50 MILLIGRAM(S): 50 TABLET ORAL at 04:49

## 2020-03-14 RX ADMIN — MONTELUKAST 10 MILLIGRAM(S): 4 TABLET, CHEWABLE ORAL at 13:31

## 2020-03-14 RX ADMIN — SEVELAMER CARBONATE 800 MILLIGRAM(S): 2400 POWDER, FOR SUSPENSION ORAL at 13:32

## 2020-03-14 RX ADMIN — CLOPIDOGREL BISULFATE 75 MILLIGRAM(S): 75 TABLET, FILM COATED ORAL at 13:31

## 2020-03-14 RX ADMIN — LIDOCAINE 1 PATCH: 4 CREAM TOPICAL at 13:31

## 2020-03-14 RX ADMIN — TRAMADOL HYDROCHLORIDE 50 MILLIGRAM(S): 50 TABLET ORAL at 04:19

## 2020-03-14 RX ADMIN — CHLORHEXIDINE GLUCONATE 1 APPLICATION(S): 213 SOLUTION TOPICAL at 05:25

## 2020-03-14 RX ADMIN — RISPERIDONE 1 MILLIGRAM(S): 4 TABLET ORAL at 05:28

## 2020-03-14 RX ADMIN — Medication 1000 MILLIGRAM(S): at 03:06

## 2020-03-14 RX ADMIN — Medication 25 MILLIGRAM(S): at 05:25

## 2020-03-14 RX ADMIN — Medication 400 MILLIGRAM(S): at 02:36

## 2020-03-14 RX ADMIN — Medication 25 MILLIGRAM(S): at 15:06

## 2020-03-14 RX ADMIN — SEVELAMER CARBONATE 800 MILLIGRAM(S): 2400 POWDER, FOR SUSPENSION ORAL at 08:13

## 2020-03-14 RX ADMIN — AMLODIPINE BESYLATE 5 MILLIGRAM(S): 2.5 TABLET ORAL at 05:25

## 2020-03-14 RX ADMIN — PANTOPRAZOLE SODIUM 40 MILLIGRAM(S): 20 TABLET, DELAYED RELEASE ORAL at 05:25

## 2020-03-14 NOTE — DISCHARGE NOTE NURSING/CASE MANAGEMENT/SOCIAL WORK - NSDCPEWEB_GEN_ALL_CORE
[facility] for Tobacco Control website --- [email or web address] website --- [email or web address]

## 2020-03-14 NOTE — DISCHARGE NOTE NURSING/CASE MANAGEMENT/SOCIAL WORK - PATIENT PORTAL LINK FT
You can access the FollowMyHealth Patient Portal offered by [facility] by registering at the following website: [email or web address]. By joining [facility]’s FollowMyHealth portal, you will also be able to view your health information using other applications (apps) compatible with our system.

## 2020-03-14 NOTE — PROGRESS NOTE ADULT - ASSESSMENT
A/P  ESRD ON HD    BEING  DIALYZED  NOW,  TOLERATING IT  WELL   UF  GOL  4 LIT     BP  I S CONTROLLED DURING HD     ADV TO LIMIT  FLUID INTAKE   TREATED FOR PNEUMONIA   FO R D/C  HOME POST HD

## 2020-03-14 NOTE — PROGRESS NOTE ADULT - SUBJECTIVE AND OBJECTIVE BOX
Patient is a 39y Male with  ESRD ON  HD   SEEN  DURING HD  TODAY  TOLERATING IT  WELL  SO  FAR     aspirin (Swelling)  fish (Unknown)  penicillins (Swelling)  shellfish (Unknown)    Hospital Medications:   MEDICATIONS  (STANDING):  amLODIPine   Tablet 5 milliGRAM(s) Oral daily  azithromycin  IVPB 500 milliGRAM(s) IV Intermittent every 24 hours  azithromycin  IVPB      budesonide 160 MICROgram(s)/formoterol 4.5 MICROgram(s) Inhaler 2 Puff(s) Inhalation two times a day  cefTRIAXone   IVPB      cefTRIAXone   IVPB 1000 milliGRAM(s) IV Intermittent every 24 hours  chlorhexidine 2% Cloths 1 Application(s) Topical <User Schedule>  clopidogrel Tablet 75 milliGRAM(s) Oral daily  dextrose 50% Injectable 25 Gram(s) IV Push once  gabapentin 300 milliGRAM(s) Oral at bedtime  heparin  Injectable 5000 Unit(s) SubCutaneous every 12 hours  hydrALAZINE 25 milliGRAM(s) Oral three times a day  insulin glargine Injectable (LANTUS) 12 Unit(s) SubCutaneous at bedtime  insulin lispro (HumaLOG) corrective regimen sliding scale   SubCutaneous Before meals and at bedtime  insulin lispro Injectable (HumaLOG) 4 Unit(s) SubCutaneous three times a day before meals  lidocaine   Patch 1 Patch Transdermal daily  montelukast 10 milliGRAM(s) Oral daily  pantoprazole    Tablet 40 milliGRAM(s) Oral before breakfast  PARoxetine 20 milliGRAM(s) Oral daily  risperiDONE   Tablet 1 milliGRAM(s) Oral every 12 hours  sevelamer carbonate 800 milliGRAM(s) Oral three times a day with meals  simvastatin 40 milliGRAM(s) Oral at bedtime    REVIEW OF SYSTEMS:  IS SLEEPING DURING  HD  EASILY  WAKENED  UP   DOESNT APPEAR  SOB    HAS SOME  COUGH   NO FEVER/CHILLS   APPETITE IS GOOD   NO  N/V   NO LEG PAIN  VITALS:  T(F): 97.9 (03-14-20 @ 08:59), Max: 98.1 (03-13-20 @ 14:18)  HR: 72 (03-14-20 @ 13:42)  BP: 163/79 (03-14-20 @ 13:42)  RR: 18 (03-14-20 @ 13:42)  SpO2: 95% (03-14-20 @ 13:42)  Wt(kg): --      PHYSICAL EXAM:  Constitutional: NAD  HEENT: CONJ PINK  Neck: No JVD  Respiratory: CTAB, no wheezes, rales or rhonchi  Cardiovascular: S1, S2, RRR  Gastrointestinal: BS+, soft, NT/ND, OBESE  Extremities:1 + peripheral edema  Neurological: A/O x 3,   :  No hernandez.     Vascular Access: AVF  L  UPPER ARM,  WORKING  WELL    LABS:        Creatinine Trend: 8.12 <--, 6.50 <--    Urine Studies:      RADIOLOGY & ADDITIONAL STUDIES:

## 2020-03-14 NOTE — PROGRESS NOTE ADULT - SUBJECTIVE AND OBJECTIVE BOX
Time of Visit:  Patient seen and examined.     MEDICATIONS  (STANDING):  amLODIPine   Tablet 5 milliGRAM(s) Oral daily  azithromycin  IVPB 500 milliGRAM(s) IV Intermittent every 24 hours  azithromycin  IVPB      budesonide 160 MICROgram(s)/formoterol 4.5 MICROgram(s) Inhaler 2 Puff(s) Inhalation two times a day  cefTRIAXone   IVPB      cefTRIAXone   IVPB 1000 milliGRAM(s) IV Intermittent every 24 hours  chlorhexidine 2% Cloths 1 Application(s) Topical <User Schedule>  clopidogrel Tablet 75 milliGRAM(s) Oral daily  dextrose 50% Injectable 25 Gram(s) IV Push once  gabapentin 300 milliGRAM(s) Oral at bedtime  heparin  Injectable 5000 Unit(s) SubCutaneous every 12 hours  hydrALAZINE 25 milliGRAM(s) Oral three times a day  insulin glargine Injectable (LANTUS) 12 Unit(s) SubCutaneous at bedtime  insulin lispro (HumaLOG) corrective regimen sliding scale   SubCutaneous Before meals and at bedtime  insulin lispro Injectable (HumaLOG) 4 Unit(s) SubCutaneous three times a day before meals  lidocaine   Patch 1 Patch Transdermal daily  montelukast 10 milliGRAM(s) Oral daily  pantoprazole    Tablet 40 milliGRAM(s) Oral before breakfast  PARoxetine 20 milliGRAM(s) Oral daily  risperiDONE   Tablet 1 milliGRAM(s) Oral every 12 hours  sevelamer carbonate 800 milliGRAM(s) Oral three times a day with meals  simvastatin 40 milliGRAM(s) Oral at bedtime      MEDICATIONS  (PRN):  acetaminophen   Tablet .. 650 milliGRAM(s) Oral every 6 hours PRN Temp greater or equal to 38C (100.4F), Mild Pain (1 - 3)  ALBUTerol    90 MICROgram(s) HFA Inhaler 2 Puff(s) Inhalation every 6 hours PRN Bronchospasm  traMADol 50 milliGRAM(s) Oral every 6 hours PRN Moderate Pain (4 - 6)       Medications up to date at time of exam.      PHYSICAL EXAMINATION:  Patient has no new complaints.  GENERAL: The patient is a well-developed, well-nourished, in no apparent distress.     Vital Signs Last 24 Hrs  T(C): 36.7 (14 Mar 2020 14:22), Max: 36.9 (14 Mar 2020 12:30)  T(F): 98 (14 Mar 2020 14:22), Max: 98.5 (14 Mar 2020 12:30)  HR: 76 (14 Mar 2020 14:22) (71 - 76)  BP: 147/82 (14 Mar 2020 14:22) (130/90 - 163/79)  BP(mean): --  RR: 17 (14 Mar 2020 14:22) (16 - 18)  SpO2: 93% (14 Mar 2020 14:22) (93% - 100%)   (if applicable)    Chest Tube (if applicable)    HEENT: Head is normocephalic and atraumatic. Extraocular muscles are intact. Mucous membranes are moist.     NECK: Supple, no palpable adenopathy.    LUNGS: Clear to auscultation, no wheezing, rales, or rhonchi.    HEART: Regular rate and rhythm without murmur.    ABDOMEN: Soft, nontender, and nondistended.  No hepatosplenomegaly is noted.    : No painful voiding, no pelvic pain    EXTREMITIES: Without any cyanosis, clubbing, rash, lesions or edema.    NEUROLOGIC: Awake, alert, oriented, grossly intact    SKIN: Warm, dry, good turgor.      LABS:                              MICROBIOLOGY: (if applicable)    RADIOLOGY & ADDITIONAL STUDIES:  EKG:   CXR:  ECHO:    IMPRESSION: 39y Male PAST MEDICAL & SURGICAL HISTORY:  COPD (chronic obstructive pulmonary disease)  Migraine  HTN (hypertension)  DM (diabetes mellitus)  Bipolar disorder  Schizophrenia  ESRD on dialysis  Morbid obesity with BMI of 40.0-44.9, adult  S/P arteriovenous (AV) fistula creation  H/O hernia repair   p/w         mpression;  This is a 39 yr old man with morbid obesity , chronic hypoxic resp failure due to COPD requiring supp O2 @ 2 LPM and prior mentioned medical condition presented with chest pain due to MOOSE pna.     Suggestion ;  -continue antibx  -f/u cultures  -OOB to chair   -O2 supp  -duonebs   -blood sugar control  -dialysis as per renal  -dvt/gi proph

## 2020-03-14 NOTE — DISCHARGE NOTE NURSING/CASE MANAGEMENT/SOCIAL WORK - NSDCPEEMAIL_GEN_ALL_CORE
[facility] for Tobacco Control email [email or web address]

## 2020-03-16 NOTE — PATIENT PROFILE ADULT - NSTRANSFERBELONGINGSRESP_GEN_A_NUR
Presents for INR for Atrial fibrillation. Therapeutic.  Recheck  In 4 weeks. Confirmed current dose of warfarin.  Verbal and written instructions given. Restates correctly. The provider for this visit was Magdalena BAKER   yes

## 2020-05-01 ENCOUNTER — APPOINTMENT (OUTPATIENT)
Dept: VASCULAR SURGERY | Facility: CLINIC | Age: 40
End: 2020-05-01

## 2020-05-22 ENCOUNTER — EMERGENCY (EMERGENCY)
Facility: HOSPITAL | Age: 40
LOS: 1 days | Discharge: HOME | End: 2020-05-22
Attending: EMERGENCY MEDICINE
Payer: MEDICARE

## 2020-05-22 VITALS
OXYGEN SATURATION: 98 % | TEMPERATURE: 98 F | HEART RATE: 82 BPM | HEIGHT: 78 IN | DIASTOLIC BLOOD PRESSURE: 80 MMHG | RESPIRATION RATE: 16 BRPM | SYSTOLIC BLOOD PRESSURE: 143 MMHG | WEIGHT: 315 LBS

## 2020-05-22 VITALS
OXYGEN SATURATION: 99 % | TEMPERATURE: 98 F | RESPIRATION RATE: 18 BRPM | HEART RATE: 77 BPM | DIASTOLIC BLOOD PRESSURE: 77 MMHG | SYSTOLIC BLOOD PRESSURE: 132 MMHG

## 2020-05-22 DIAGNOSIS — Z98.890 OTHER SPECIFIED POSTPROCEDURAL STATES: Chronic | ICD-10-CM

## 2020-05-22 LAB
ALBUMIN SERPL ELPH-MCNC: 3.4 G/DL — LOW (ref 3.5–5)
ALP SERPL-CCNC: 154 U/L — HIGH (ref 40–120)
ALT FLD-CCNC: 26 U/L DA — SIGNIFICANT CHANGE UP (ref 10–60)
ANION GAP SERPL CALC-SCNC: 9 MMOL/L — SIGNIFICANT CHANGE UP (ref 5–17)
AST SERPL-CCNC: 20 U/L — SIGNIFICANT CHANGE UP (ref 10–40)
BASOPHILS # BLD AUTO: 0.05 K/UL — SIGNIFICANT CHANGE UP (ref 0–0.2)
BASOPHILS NFR BLD AUTO: 0.6 % — SIGNIFICANT CHANGE UP (ref 0–2)
BILIRUB SERPL-MCNC: 0.4 MG/DL — SIGNIFICANT CHANGE UP (ref 0.2–1.2)
BUN SERPL-MCNC: 37 MG/DL — HIGH (ref 7–18)
CALCIUM SERPL-MCNC: 9.6 MG/DL — SIGNIFICANT CHANGE UP (ref 8.4–10.5)
CHLORIDE SERPL-SCNC: 97 MMOL/L — SIGNIFICANT CHANGE UP (ref 96–108)
CO2 SERPL-SCNC: 28 MMOL/L — SIGNIFICANT CHANGE UP (ref 22–31)
CREAT SERPL-MCNC: 5.13 MG/DL — HIGH (ref 0.5–1.3)
EOSINOPHIL # BLD AUTO: 0.27 K/UL — SIGNIFICANT CHANGE UP (ref 0–0.5)
EOSINOPHIL NFR BLD AUTO: 3.2 % — SIGNIFICANT CHANGE UP (ref 0–6)
GLUCOSE SERPL-MCNC: 113 MG/DL — HIGH (ref 70–99)
HCT VFR BLD CALC: 35.4 % — LOW (ref 39–50)
HGB BLD-MCNC: 11.2 G/DL — LOW (ref 13–17)
IMM GRANULOCYTES NFR BLD AUTO: 0.2 % — SIGNIFICANT CHANGE UP (ref 0–1.5)
LYMPHOCYTES # BLD AUTO: 0.78 K/UL — LOW (ref 1–3.3)
LYMPHOCYTES # BLD AUTO: 9.1 % — LOW (ref 13–44)
MCHC RBC-ENTMCNC: 29.7 PG — SIGNIFICANT CHANGE UP (ref 27–34)
MCHC RBC-ENTMCNC: 31.6 GM/DL — LOW (ref 32–36)
MCV RBC AUTO: 93.9 FL — SIGNIFICANT CHANGE UP (ref 80–100)
MONOCYTES # BLD AUTO: 0.92 K/UL — HIGH (ref 0–0.9)
MONOCYTES NFR BLD AUTO: 10.8 % — SIGNIFICANT CHANGE UP (ref 2–14)
NEUTROPHILS # BLD AUTO: 6.5 K/UL — SIGNIFICANT CHANGE UP (ref 1.8–7.4)
NEUTROPHILS NFR BLD AUTO: 76.1 % — SIGNIFICANT CHANGE UP (ref 43–77)
NRBC # BLD: 0 /100 WBCS — SIGNIFICANT CHANGE UP (ref 0–0)
NT-PROBNP SERPL-SCNC: 7590 PG/ML — HIGH (ref 0–125)
PLATELET # BLD AUTO: 239 K/UL — SIGNIFICANT CHANGE UP (ref 150–400)
POTASSIUM SERPL-MCNC: 4 MMOL/L — SIGNIFICANT CHANGE UP (ref 3.5–5.3)
POTASSIUM SERPL-SCNC: 4 MMOL/L — SIGNIFICANT CHANGE UP (ref 3.5–5.3)
PROT SERPL-MCNC: 8.5 G/DL — HIGH (ref 6–8.3)
RBC # BLD: 3.77 M/UL — LOW (ref 4.2–5.8)
RBC # FLD: 14.9 % — HIGH (ref 10.3–14.5)
SARS-COV-2 RNA SPEC QL NAA+PROBE: SIGNIFICANT CHANGE UP
SODIUM SERPL-SCNC: 134 MMOL/L — LOW (ref 135–145)
TROPONIN I SERPL-MCNC: <0.015 NG/ML — SIGNIFICANT CHANGE UP (ref 0–0.04)
WBC # BLD: 8.54 K/UL — SIGNIFICANT CHANGE UP (ref 3.8–10.5)
WBC # FLD AUTO: 8.54 K/UL — SIGNIFICANT CHANGE UP (ref 3.8–10.5)

## 2020-05-22 PROCEDURE — 71045 X-RAY EXAM CHEST 1 VIEW: CPT | Mod: 26

## 2020-05-22 PROCEDURE — 36415 COLL VENOUS BLD VENIPUNCTURE: CPT

## 2020-05-22 PROCEDURE — 80053 COMPREHEN METABOLIC PANEL: CPT

## 2020-05-22 PROCEDURE — 87635 SARS-COV-2 COVID-19 AMP PRB: CPT

## 2020-05-22 PROCEDURE — 85027 COMPLETE CBC AUTOMATED: CPT

## 2020-05-22 PROCEDURE — 99284 EMERGENCY DEPT VISIT MOD MDM: CPT | Mod: 25

## 2020-05-22 PROCEDURE — 99284 EMERGENCY DEPT VISIT MOD MDM: CPT

## 2020-05-22 PROCEDURE — 83880 ASSAY OF NATRIURETIC PEPTIDE: CPT

## 2020-05-22 PROCEDURE — 70450 CT HEAD/BRAIN W/O DYE: CPT

## 2020-05-22 PROCEDURE — 84484 ASSAY OF TROPONIN QUANT: CPT

## 2020-05-22 PROCEDURE — 93005 ELECTROCARDIOGRAM TRACING: CPT

## 2020-05-22 PROCEDURE — 71045 X-RAY EXAM CHEST 1 VIEW: CPT

## 2020-05-22 PROCEDURE — 70450 CT HEAD/BRAIN W/O DYE: CPT | Mod: 26

## 2020-05-22 RX ORDER — ACETAMINOPHEN 500 MG
650 TABLET ORAL ONCE
Refills: 0 | Status: COMPLETED | OUTPATIENT
Start: 2020-05-22 | End: 2020-05-22

## 2020-05-22 RX ADMIN — Medication 650 MILLIGRAM(S): at 12:22

## 2020-05-22 NOTE — ED ADULT NURSE NOTE - NSIMPLEMENTINTERV_GEN_ALL_ED
Implemented All Universal Safety Interventions:  [place] to call system. Call bell, personal items and telephone within reach. Instruct patient to call for assistance. Room bathroom lighting operational. Non-slip footwear when patient is off stretcher. Physically safe environment: no spills, clutter or unnecessary equipment. Stretcher in lowest position, wheels locked, appropriate side rails in place.

## 2020-05-22 NOTE — ED ADULT NURSE NOTE - CHIEF COMPLAINT QUOTE
40 yr old m biba with c/o of right chest pain during dialysis, pt stated " I pass out during dialysis" upper left arm fistula, dialysis m.w fri, denies SOB

## 2020-05-22 NOTE — ED ADULT NURSE NOTE - OBJECTIVE STATEMENT
Pt AOx4, BIBA, c/o right chest pain, SOB, and syncopal episode during dialysis today. Left AV fistula in place, arm precautions initiated

## 2020-05-22 NOTE — ED PROVIDER NOTE - CLINICAL SUMMARY MEDICAL DECISION MAKING FREE TEXT BOX
41 y/o male, ESRD on HD presents following episode of right sided chest pain and syncope.  pt did not finish dialysis.  BP normal, EKG is NSR, will check labs, head CT, call renal for dialysis.

## 2020-05-22 NOTE — ED ADULT TRIAGE NOTE - CHIEF COMPLAINT QUOTE
40 yr old m biba with c/o of right chest pain during dialysis, pt stated " I pass out during dialysis" upper left arm fistula, dialysis jean donnelly, denies SOB 40 yr old m biba with c/o of right chest pain during dialysis, pt stated " I pass out during dialysis" upper left arm fistula, dialysis m.w fri, denies SOB

## 2020-05-22 NOTE — ED PROVIDER NOTE - PATIENT PORTAL LINK FT
You can access the FollowMyHealth Patient Portal offered by [facility] by registering at the following website: [email or web address]. By joining [facility]’s FollowMyHealth portal, you will also be able to view your health information using other applications (apps) compatible with our system.

## 2020-05-22 NOTE — ED PROVIDER NOTE - PROGRESS NOTE DETAILS
Case discussed with Dr. La of Renal.  Pt does not need emergent dialysis today.  I recommended admission to patient for syncope however patient says he feels well and does not want to be admitted.  Pt plans to follow up with PMD or return to ED should he feel worse.

## 2020-05-22 NOTE — ED PROVIDER NOTE - OBJECTIVE STATEMENT
41 y/o male with PMHx of ESRD on HD (M,W,F), DM, HTN, COPD, Bipolar, schizoaffective disorder presents with right sided chest pain and syncope.  pt was getting dialysis when the symptoms occurred.  As per patient he was 3 hours into his normally 5 hour dialysis when the symptoms began.  he was then told he became unresponsive.  pt does not recall.  EMS was called.  pt did not finish dialysis.

## 2020-06-01 NOTE — PATIENT PROFILE ADULT - NSPROMUTINFOINDIVIDFT_GEN_A_NUR
Attending Only "I have difficulty walking because of the breathing and my swollen legs. I don't make pee anymore".

## 2020-06-04 NOTE — PROGRESS NOTE ADULT - PROBLEM SELECTOR PLAN 1
Additional Notes: Medication update Quality 130: Documentation Of Current Medications In The Medical Record: Current Medications Documented Detail Level: Simple Pt presented with chest pain atypical in nature  EKG- no ST T wave changes.  cardiac enzymes were negative  ECHO done on september showed Grade 3 DD and moderate pulmonary hypertension  pro BNP was 44 while it was 31,000 on last admission  Dr Galeana consulted  Pt is allergic to aspirin and c/w plavix   c/w Methocarbamol  no need for further inpatient cardiac workup, pt can follow with cardiologist outpatient

## 2020-06-07 ENCOUNTER — INPATIENT (INPATIENT)
Facility: HOSPITAL | Age: 40
LOS: 1 days | Discharge: HOME | DRG: 189 | End: 2020-06-09
Attending: INTERNAL MEDICINE | Admitting: INTERNAL MEDICINE
Payer: MEDICARE

## 2020-06-07 VITALS
DIASTOLIC BLOOD PRESSURE: 95 MMHG | SYSTOLIC BLOOD PRESSURE: 203 MMHG | WEIGHT: 315 LBS | TEMPERATURE: 97 F | OXYGEN SATURATION: 96 % | HEART RATE: 95 BPM | RESPIRATION RATE: 27 BRPM

## 2020-06-07 DIAGNOSIS — Z98.890 OTHER SPECIFIED POSTPROCEDURAL STATES: Chronic | ICD-10-CM

## 2020-06-07 RX ORDER — SODIUM CHLORIDE 9 MG/ML
3 INJECTION INTRAMUSCULAR; INTRAVENOUS; SUBCUTANEOUS ONCE
Refills: 0 | Status: COMPLETED | OUTPATIENT
Start: 2020-06-07 | End: 2020-06-07

## 2020-06-07 RX ADMIN — SODIUM CHLORIDE 3 MILLILITER(S): 9 INJECTION INTRAMUSCULAR; INTRAVENOUS; SUBCUTANEOUS at 23:56

## 2020-06-07 NOTE — ED ADULT TRIAGE NOTE - CHIEF COMPLAINT QUOTE
as per ems pt got has a asthma attack started 40 min ago , pt received albuterol neb x 3 and solumedrol 150 mg iv , pt has a left upper arm a-v dialysis shunt, allergies - penicillin and shellfish , Dialysis schedule MWF, IV access placed by ems - right hand  gauge 20

## 2020-06-08 DIAGNOSIS — Z29.9 ENCOUNTER FOR PROPHYLACTIC MEASURES, UNSPECIFIED: ICD-10-CM

## 2020-06-08 DIAGNOSIS — I16.0 HYPERTENSIVE URGENCY: ICD-10-CM

## 2020-06-08 DIAGNOSIS — J96.21 ACUTE AND CHRONIC RESPIRATORY FAILURE WITH HYPOXIA: ICD-10-CM

## 2020-06-08 DIAGNOSIS — I77.0 ARTERIOVENOUS FISTULA, ACQUIRED: Chronic | ICD-10-CM

## 2020-06-08 DIAGNOSIS — R06.00 DYSPNEA, UNSPECIFIED: ICD-10-CM

## 2020-06-08 DIAGNOSIS — N18.6 END STAGE RENAL DISEASE: ICD-10-CM

## 2020-06-08 DIAGNOSIS — J45.909 UNSPECIFIED ASTHMA, UNCOMPLICATED: ICD-10-CM

## 2020-06-08 DIAGNOSIS — Z65.9 PROBLEM RELATED TO UNSPECIFIED PSYCHOSOCIAL CIRCUMSTANCES: ICD-10-CM

## 2020-06-08 DIAGNOSIS — E11.69 TYPE 2 DIABETES MELLITUS WITH OTHER SPECIFIED COMPLICATION: ICD-10-CM

## 2020-06-08 DIAGNOSIS — F20.9 SCHIZOPHRENIA, UNSPECIFIED: ICD-10-CM

## 2020-06-08 LAB
24R-OH-CALCIDIOL SERPL-MCNC: 39.3 NG/ML — SIGNIFICANT CHANGE UP (ref 30–80)
A1C WITH ESTIMATED AVERAGE GLUCOSE RESULT: 5.2 % — SIGNIFICANT CHANGE UP (ref 4–5.6)
ANION GAP SERPL CALC-SCNC: 11 MMOL/L — SIGNIFICANT CHANGE UP (ref 5–17)
ANION GAP SERPL CALC-SCNC: 18 MMOL/L — HIGH (ref 5–17)
APTT BLD: 33.4 SEC — SIGNIFICANT CHANGE UP (ref 27.5–36.3)
BASE EXCESS BLDA CALC-SCNC: -7 MMOL/L — LOW (ref -2–2)
BASOPHILS # BLD AUTO: 0.02 K/UL — SIGNIFICANT CHANGE UP (ref 0–0.2)
BASOPHILS # BLD AUTO: 0.03 K/UL — SIGNIFICANT CHANGE UP (ref 0–0.2)
BASOPHILS NFR BLD AUTO: 0.2 % — SIGNIFICANT CHANGE UP (ref 0–2)
BASOPHILS NFR BLD AUTO: 0.4 % — SIGNIFICANT CHANGE UP (ref 0–2)
BLOOD GAS COMMENTS ARTERIAL: SIGNIFICANT CHANGE UP
BUN SERPL-MCNC: 67 MG/DL — HIGH (ref 7–18)
BUN SERPL-MCNC: 73 MG/DL — HIGH (ref 7–18)
CALCIUM SERPL-MCNC: 10.2 MG/DL — SIGNIFICANT CHANGE UP (ref 8.4–10.5)
CALCIUM SERPL-MCNC: 10.4 MG/DL — SIGNIFICANT CHANGE UP (ref 8.4–10.5)
CHLORIDE SERPL-SCNC: 100 MMOL/L — SIGNIFICANT CHANGE UP (ref 96–108)
CHLORIDE SERPL-SCNC: 104 MMOL/L — SIGNIFICANT CHANGE UP (ref 96–108)
CHOLEST SERPL-MCNC: 109 MG/DL — SIGNIFICANT CHANGE UP (ref 10–199)
CO2 SERPL-SCNC: 19 MMOL/L — LOW (ref 22–31)
CO2 SERPL-SCNC: 25 MMOL/L — SIGNIFICANT CHANGE UP (ref 22–31)
CREAT SERPL-MCNC: 10.7 MG/DL — HIGH (ref 0.5–1.3)
CREAT SERPL-MCNC: 11.6 MG/DL — HIGH (ref 0.5–1.3)
EOSINOPHIL # BLD AUTO: 0.04 K/UL — SIGNIFICANT CHANGE UP (ref 0–0.5)
EOSINOPHIL # BLD AUTO: 0.31 K/UL — SIGNIFICANT CHANGE UP (ref 0–0.5)
EOSINOPHIL NFR BLD AUTO: 0.4 % — SIGNIFICANT CHANGE UP (ref 0–6)
EOSINOPHIL NFR BLD AUTO: 4 % — SIGNIFICANT CHANGE UP (ref 0–6)
ESTIMATED AVERAGE GLUCOSE: 103 MG/DL — SIGNIFICANT CHANGE UP (ref 68–114)
FERRITIN SERPL-MCNC: 1553 NG/ML — HIGH (ref 30–400)
FOLATE SERPL-MCNC: >20 NG/ML — SIGNIFICANT CHANGE UP
GLUCOSE BLDC GLUCOMTR-MCNC: 119 MG/DL — HIGH (ref 70–99)
GLUCOSE BLDC GLUCOMTR-MCNC: 192 MG/DL — HIGH (ref 70–99)
GLUCOSE BLDC GLUCOMTR-MCNC: 217 MG/DL — HIGH (ref 70–99)
GLUCOSE SERPL-MCNC: 115 MG/DL — HIGH (ref 70–99)
GLUCOSE SERPL-MCNC: 227 MG/DL — HIGH (ref 70–99)
HCO3 BLDA-SCNC: 19 MMOL/L — LOW (ref 23–27)
HCT VFR BLD CALC: 28.2 % — LOW (ref 39–50)
HCT VFR BLD CALC: 29.2 % — LOW (ref 39–50)
HDLC SERPL-MCNC: 53 MG/DL — SIGNIFICANT CHANGE UP
HGB BLD-MCNC: 8.9 G/DL — LOW (ref 13–17)
HGB BLD-MCNC: 9.2 G/DL — LOW (ref 13–17)
HOROWITZ INDEX BLDA+IHG-RTO: 100 — SIGNIFICANT CHANGE UP
IMM GRANULOCYTES NFR BLD AUTO: 0.4 % — SIGNIFICANT CHANGE UP (ref 0–1.5)
IMM GRANULOCYTES NFR BLD AUTO: 0.9 % — SIGNIFICANT CHANGE UP (ref 0–1.5)
INR BLD: 1.06 RATIO — SIGNIFICANT CHANGE UP (ref 0.88–1.16)
INR BLD: 1.06 RATIO — SIGNIFICANT CHANGE UP (ref 0.88–1.16)
IRON SATN MFR SERPL: 22 % — SIGNIFICANT CHANGE UP (ref 20–55)
IRON SATN MFR SERPL: 53 UG/DL — LOW (ref 65–170)
LDH SERPL L TO P-CCNC: 160 U/L — SIGNIFICANT CHANGE UP (ref 120–225)
LIPID PNL WITH DIRECT LDL SERPL: 47 MG/DL — SIGNIFICANT CHANGE UP
LYMPHOCYTES # BLD AUTO: 0.44 K/UL — LOW (ref 1–3.3)
LYMPHOCYTES # BLD AUTO: 1.24 K/UL — SIGNIFICANT CHANGE UP (ref 1–3.3)
LYMPHOCYTES # BLD AUTO: 15.8 % — SIGNIFICANT CHANGE UP (ref 13–44)
LYMPHOCYTES # BLD AUTO: 4.8 % — LOW (ref 13–44)
MAGNESIUM SERPL-MCNC: 2.3 MG/DL — SIGNIFICANT CHANGE UP (ref 1.6–2.6)
MAGNESIUM SERPL-MCNC: 2.4 MG/DL — SIGNIFICANT CHANGE UP (ref 1.6–2.6)
MCHC RBC-ENTMCNC: 30.1 PG — SIGNIFICANT CHANGE UP (ref 27–34)
MCHC RBC-ENTMCNC: 30.4 PG — SIGNIFICANT CHANGE UP (ref 27–34)
MCHC RBC-ENTMCNC: 31.5 GM/DL — LOW (ref 32–36)
MCHC RBC-ENTMCNC: 31.6 GM/DL — LOW (ref 32–36)
MCV RBC AUTO: 95.4 FL — SIGNIFICANT CHANGE UP (ref 80–100)
MCV RBC AUTO: 96.2 FL — SIGNIFICANT CHANGE UP (ref 80–100)
MONOCYTES # BLD AUTO: 0.19 K/UL — SIGNIFICANT CHANGE UP (ref 0–0.9)
MONOCYTES # BLD AUTO: 0.85 K/UL — SIGNIFICANT CHANGE UP (ref 0–0.9)
MONOCYTES NFR BLD AUTO: 10.8 % — SIGNIFICANT CHANGE UP (ref 2–14)
MONOCYTES NFR BLD AUTO: 2.1 % — SIGNIFICANT CHANGE UP (ref 2–14)
NEUTROPHILS # BLD AUTO: 5.38 K/UL — SIGNIFICANT CHANGE UP (ref 1.8–7.4)
NEUTROPHILS # BLD AUTO: 8.35 K/UL — HIGH (ref 1.8–7.4)
NEUTROPHILS NFR BLD AUTO: 68.6 % — SIGNIFICANT CHANGE UP (ref 43–77)
NEUTROPHILS NFR BLD AUTO: 91.6 % — HIGH (ref 43–77)
NRBC # BLD: 0 /100 WBCS — SIGNIFICANT CHANGE UP (ref 0–0)
NRBC # BLD: 0 /100 WBCS — SIGNIFICANT CHANGE UP (ref 0–0)
PCO2 BLDA: 45 MMHG — SIGNIFICANT CHANGE UP (ref 32–46)
PH BLDA: 7.26 — LOW (ref 7.35–7.45)
PHOSPHATE SERPL-MCNC: 7.5 MG/DL — HIGH (ref 2.5–4.5)
PHOSPHATE SERPL-MCNC: 7.6 MG/DL — HIGH (ref 2.5–4.5)
PLATELET # BLD AUTO: 233 K/UL — SIGNIFICANT CHANGE UP (ref 150–400)
PLATELET # BLD AUTO: 256 K/UL — SIGNIFICANT CHANGE UP (ref 150–400)
PO2 BLDA: 279 MMHG — HIGH (ref 74–108)
POTASSIUM SERPL-MCNC: 4.5 MMOL/L — SIGNIFICANT CHANGE UP (ref 3.5–5.3)
POTASSIUM SERPL-MCNC: 4.5 MMOL/L — SIGNIFICANT CHANGE UP (ref 3.5–5.3)
POTASSIUM SERPL-SCNC: 4.5 MMOL/L — SIGNIFICANT CHANGE UP (ref 3.5–5.3)
POTASSIUM SERPL-SCNC: 4.5 MMOL/L — SIGNIFICANT CHANGE UP (ref 3.5–5.3)
PROTHROM AB SERPL-ACNC: 12 SEC — SIGNIFICANT CHANGE UP (ref 10–12.9)
PROTHROM AB SERPL-ACNC: 12 SEC — SIGNIFICANT CHANGE UP (ref 10–12.9)
RBC # BLD: 2.93 M/UL — LOW (ref 4.2–5.8)
RBC # BLD: 2.93 M/UL — LOW (ref 4.2–5.8)
RBC # BLD: 3.06 M/UL — LOW (ref 4.2–5.8)
RBC # FLD: 15.1 % — HIGH (ref 10.3–14.5)
RBC # FLD: 15.2 % — HIGH (ref 10.3–14.5)
RETICS #: 61.5 K/UL — SIGNIFICANT CHANGE UP (ref 25–125)
RETICS/RBC NFR: 2.1 % — SIGNIFICANT CHANGE UP (ref 0.5–2.5)
SAO2 % BLDA: 100 % — HIGH (ref 92–96)
SARS-COV-2 RNA SPEC QL NAA+PROBE: SIGNIFICANT CHANGE UP
SODIUM SERPL-SCNC: 137 MMOL/L — SIGNIFICANT CHANGE UP (ref 135–145)
SODIUM SERPL-SCNC: 140 MMOL/L — SIGNIFICANT CHANGE UP (ref 135–145)
T4 AB SER-ACNC: 5.8 UG/DL — SIGNIFICANT CHANGE UP (ref 4.6–12)
TIBC SERPL-MCNC: 241 UG/DL — LOW (ref 250–450)
TOTAL CHOLESTEROL/HDL RATIO MEASUREMENT: 2.1 RATIO — LOW (ref 3.4–9.6)
TRIGL SERPL-MCNC: 46 MG/DL — SIGNIFICANT CHANGE UP (ref 10–149)
TROPONIN I SERPL-MCNC: 0.06 NG/ML — HIGH (ref 0–0.04)
TSH SERPL-MCNC: 1.6 UU/ML — SIGNIFICANT CHANGE UP (ref 0.34–4.82)
UIBC SERPL-MCNC: 188 UG/DL — SIGNIFICANT CHANGE UP (ref 110–370)
VIT B12 SERPL-MCNC: 1211 PG/ML — SIGNIFICANT CHANGE UP (ref 232–1245)
WBC # BLD: 7.84 K/UL — SIGNIFICANT CHANGE UP (ref 3.8–10.5)
WBC # BLD: 9.12 K/UL — SIGNIFICANT CHANGE UP (ref 3.8–10.5)
WBC # FLD AUTO: 7.84 K/UL — SIGNIFICANT CHANGE UP (ref 3.8–10.5)
WBC # FLD AUTO: 9.12 K/UL — SIGNIFICANT CHANGE UP (ref 3.8–10.5)

## 2020-06-08 PROCEDURE — 99222 1ST HOSP IP/OBS MODERATE 55: CPT

## 2020-06-08 PROCEDURE — 71045 X-RAY EXAM CHEST 1 VIEW: CPT | Mod: 26

## 2020-06-08 RX ORDER — INSULIN LISPRO 100/ML
VIAL (ML) SUBCUTANEOUS
Refills: 0 | Status: DISCONTINUED | OUTPATIENT
Start: 2020-06-08 | End: 2020-06-09

## 2020-06-08 RX ORDER — IPRATROPIUM/ALBUTEROL SULFATE 18-103MCG
3 AEROSOL WITH ADAPTER (GRAM) INHALATION ONCE
Refills: 0 | Status: COMPLETED | OUTPATIENT
Start: 2020-06-08 | End: 2020-06-08

## 2020-06-08 RX ORDER — HEPARIN SODIUM 5000 [USP'U]/ML
5000 INJECTION INTRAVENOUS; SUBCUTANEOUS EVERY 12 HOURS
Refills: 0 | Status: DISCONTINUED | OUTPATIENT
Start: 2020-06-08 | End: 2020-06-09

## 2020-06-08 RX ORDER — HYDRALAZINE HCL 50 MG
25 TABLET ORAL THREE TIMES A DAY
Refills: 0 | Status: DISCONTINUED | OUTPATIENT
Start: 2020-06-08 | End: 2020-06-09

## 2020-06-08 RX ORDER — ATORVASTATIN CALCIUM 80 MG/1
40 TABLET, FILM COATED ORAL AT BEDTIME
Refills: 0 | Status: DISCONTINUED | OUTPATIENT
Start: 2020-06-08 | End: 2020-06-09

## 2020-06-08 RX ORDER — HYDRALAZINE HCL 50 MG
25 TABLET ORAL ONCE
Refills: 0 | Status: COMPLETED | OUTPATIENT
Start: 2020-06-08 | End: 2020-06-08

## 2020-06-08 RX ORDER — ERYTHROPOIETIN 10000 [IU]/ML
10000 INJECTION, SOLUTION INTRAVENOUS; SUBCUTANEOUS
Refills: 0 | Status: DISCONTINUED | OUTPATIENT
Start: 2020-06-08 | End: 2020-06-09

## 2020-06-08 RX ORDER — RISPERIDONE 4 MG/1
1 TABLET ORAL DAILY
Refills: 0 | Status: DISCONTINUED | OUTPATIENT
Start: 2020-06-08 | End: 2020-06-09

## 2020-06-08 RX ORDER — CLOPIDOGREL BISULFATE 75 MG/1
75 TABLET, FILM COATED ORAL DAILY
Refills: 0 | Status: DISCONTINUED | OUTPATIENT
Start: 2020-06-08 | End: 2020-06-09

## 2020-06-08 RX ORDER — AMLODIPINE BESYLATE 2.5 MG/1
5 TABLET ORAL DAILY
Refills: 0 | Status: DISCONTINUED | OUTPATIENT
Start: 2020-06-08 | End: 2020-06-09

## 2020-06-08 RX ORDER — MONTELUKAST 4 MG/1
10 TABLET, CHEWABLE ORAL AT BEDTIME
Refills: 0 | Status: DISCONTINUED | OUTPATIENT
Start: 2020-06-08 | End: 2020-06-09

## 2020-06-08 RX ORDER — LABETALOL HCL 100 MG
10 TABLET ORAL ONCE
Refills: 0 | Status: COMPLETED | OUTPATIENT
Start: 2020-06-08 | End: 2020-06-08

## 2020-06-08 RX ORDER — PANTOPRAZOLE SODIUM 20 MG/1
40 TABLET, DELAYED RELEASE ORAL
Refills: 0 | Status: DISCONTINUED | OUTPATIENT
Start: 2020-06-08 | End: 2020-06-09

## 2020-06-08 RX ORDER — ALBUTEROL 90 UG/1
2 AEROSOL, METERED ORAL EVERY 6 HOURS
Refills: 0 | Status: DISCONTINUED | OUTPATIENT
Start: 2020-06-08 | End: 2020-06-09

## 2020-06-08 RX ADMIN — CLOPIDOGREL BISULFATE 75 MILLIGRAM(S): 75 TABLET, FILM COATED ORAL at 12:58

## 2020-06-08 RX ADMIN — Medication 1: at 12:59

## 2020-06-08 RX ADMIN — Medication 25 MILLIGRAM(S): at 05:27

## 2020-06-08 RX ADMIN — Medication 2: at 08:50

## 2020-06-08 RX ADMIN — Medication 20 MILLIGRAM(S): at 12:59

## 2020-06-08 RX ADMIN — AMLODIPINE BESYLATE 5 MILLIGRAM(S): 2.5 TABLET ORAL at 05:27

## 2020-06-08 RX ADMIN — HEPARIN SODIUM 5000 UNIT(S): 5000 INJECTION INTRAVENOUS; SUBCUTANEOUS at 17:58

## 2020-06-08 RX ADMIN — RISPERIDONE 1 MILLIGRAM(S): 4 TABLET ORAL at 12:59

## 2020-06-08 RX ADMIN — Medication 10 MILLIGRAM(S): at 04:34

## 2020-06-08 RX ADMIN — Medication 3 MILLILITER(S): at 02:33

## 2020-06-08 RX ADMIN — Medication 25 MILLIGRAM(S): at 00:48

## 2020-06-08 RX ADMIN — HEPARIN SODIUM 5000 UNIT(S): 5000 INJECTION INTRAVENOUS; SUBCUTANEOUS at 05:27

## 2020-06-08 RX ADMIN — Medication 25 MILLIGRAM(S): at 13:45

## 2020-06-08 RX ADMIN — PANTOPRAZOLE SODIUM 40 MILLIGRAM(S): 20 TABLET, DELAYED RELEASE ORAL at 08:50

## 2020-06-08 NOTE — H&P ADULT - PROBLEM SELECTOR PLAN 6
IMPROVE VTE Individual Risk Assessment          RISK                                                          Points  [  ] Previous VTE                                                3  [  ] Thrombophilia                                             2  [  ] Lower limb paralysis                                   2        (unable to hold up >15 seconds)    [  ] Current Cancer                                             2         (within 6 months)  [  ] Immobilization > 24 hrs                              1  [  ] ICU/CCU stay > 24 hours                             1  [  ] Age > 60                                                         1    IMPROVE VTE Score:     Heparin s.c. 5000 U q 12 hours c/w Risperidone 1 mg and Paroxetine 20 mg Hgb 9.2  f/u Anemia Panel (TIBC, Ferritin, Iron Total)  Due to ESRD  Consider Epogen with HD  Nephrology Consult: Dr. La

## 2020-06-08 NOTE — H&P ADULT - PROBLEM SELECTOR PLAN 8
IMPROVE VTE Individual Risk Assessment          RISK                                                          Points  [  ] Previous VTE                                                3  [  ] Thrombophilia                                             2  [  ] Lower limb paralysis                                   2        (unable to hold up >15 seconds)    [  ] Current Cancer                                             2         (within 6 months)  [  ] Immobilization > 24 hrs                              1  [  ] ICU/CCU stay > 24 hours                             1  [  ] Age > 60                                                         1    IMPROVE VTE Score:     Heparin s.c. 5000 U q 12 hours Resumption of HD in the community

## 2020-06-08 NOTE — CONSULT NOTE ADULT - ASSESSMENT
Patient is a 40y Male with ESRD on HD MWF. Family called 911 this morning as he was short of breath ( from acute asthma exercebation). His nebulizer machine is broken. In ED SOB improved with nebulization. Renal consulted as has ESRD    #ESRD. HD today with high flux dilayzer. UF as tolerated  #HTN. Uncontrolled. s/p bolus labetalol. outpt meds resumed. UF on HD  # lytes/acid base at goal.  # anemia of CKD . Procrit on HD

## 2020-06-08 NOTE — H&P ADULT - PROBLEM SELECTOR PLAN 9
IMPROVE VTE Individual Risk Assessment          RISK                                                          Points  [  ] Previous VTE                                                3  [  ] Thrombophilia                                             2  [  ] Lower limb paralysis                                   2        (unable to hold up >15 seconds)    [  ] Current Cancer                                             2         (within 6 months)  [  ] Immobilization > 24 hrs                              1  [  ] ICU/CCU stay > 24 hours                             1  [  ] Age > 60                                                         1    IMPROVE VTE Score:     Heparin s.c. 5000 U q 12 hours

## 2020-06-08 NOTE — ED PROVIDER NOTE - PHYSICAL EXAMINATION
Lungs CTA b/l no active wheezing on evaluation, no retractions. Pt speaking full sentences  Left arm AV fistula + bruits and thrills intact.

## 2020-06-08 NOTE — H&P ADULT - PROBLEM SELECTOR PLAN 2
203/95 BP on admission   s/p Hydralazine 25 mg   HR: 113 (2/2 Duonebs)  Monitor BP 203/95 BP on admission   s/p Hydralazine 25 mg and 10 mg of Labetalol  HR: 113 (2/2 Duonebs)  c/w BP medications with parameters  Monitor BP Patient has a history of asthma & COPD and p/w acute asthma exacerbation  RR 27 initially then improved to 24 with 92%NC on 4L  AB.26 / 279 O2 / 45 CO2  s/p Duonebs  Titrate off of nasal canula  Albuterol PRN  Consider IV Solumedrol  No WBC or signs of infection

## 2020-06-08 NOTE — ED PROVIDER NOTE - CLINICAL SUMMARY MEDICAL DECISION MAKING FREE TEXT BOX
1235a- Pt feels better, no exertional or conversational dyspnea. Pt with previous registration # [id number]. records reviewed pt is on Hydralazine 25 mg PO will provide dose in ED. 1235a- Pt feels better, no conversational dyspnea. Pt with previous registration # [id number]. records reviewed pt is on Hydralazine 25 mg PO will provide dose in ED.  240am- pt feels shortness of breath, worst with exertion. Pox on RA now 93%. BP decreased to 185/93. Pt is due for HD in am. case dw Dr. Tong will admit for cardio-resp monitoring.   Note TAHMINA Salgado was also informed of pt on initial arrival as per complex care note 1235a- Pt feels better, no conversational dyspnea. Pt with previous registration # [id number]. records reviewed pt is on Hydralazine 25 mg PO will provide dose in ED.  240am- Pt now feels shortness of breath, worst with exertion. Pox on RA now 93%. BP decreased to 185/93. Pt is due for HD in am. case dw Dr. Tong will admit for cardio-resp monitoring and HD in am.  I had a detailed discussion with the patient and/or guardian regarding the historical points, exam findings, and any diagnostic results supporting the admit diagnosis.   Note ATHMINA Damian was also informed of pt on initial arrival as per complex care note

## 2020-06-08 NOTE — H&P ADULT - ATTENDING COMMENTS
Pt seen at bedside.  Case discussed with housestaff  Agree with HPI as above    Vital Signs Last 24 Hrs  T(C): 37.1 (08 Jun 2020 04:18), Max: 37.1 (08 Jun 2020 04:18)  T(F): 98.7 (08 Jun 2020 04:18), Max: 98.7 (08 Jun 2020 04:18)  HR: 85 (08 Jun 2020 05:29) (85 - 113)  BP: 166/90 (08 Jun 2020 05:29) (166/90 - 203/95)  RR: 22 (08 Jun 2020 04:18) (22 - 27)  SpO2: 100% (08 Jun 2020 04:18) (92% - 100%)    Exam   Young obese man NAD on o2 supplements  AAO X 3 talking in full sentences  CTA B/L RRR S1S2 only  Soft NT ND BS +  AV fistula site +  No focal deficits    Labs                        8.9    9.12  )-----------( 233      ( 08 Jun 2020 06:29 )             28.2     06-08    137  |  100  |  73  ----------------------<  227<H>  4.5   |  19  |  11.60    Ca    10.2      08 Jun 2020 06:29  Phos  7.6     06-08  Mg     2.4     06-08      CARDIAC MARKERS ( 08 Jun 2020 06:29 )  0.056 ng/mL / x     / x     / x     / x        Pro-BNP - 21556  ABG - ( 08 Jun 2020 03:04 )  pH, Arterial: 7.26  pH, Blood: x     /  pCO2: 45    /  pO2: 279   / HCO3: 19    / Base Excess: -7.0  /  SaO2: 100       CXR concerning for B/L pulmonary edema with widespread interstitial infiltrates    Impression  40 year old man with extensive medical hx including ESRD on HD, asthma presenting in acute resp failure from acute asthma eacerbation.    A/P  - Acute respiratory failure  - Acute asthma exacerbation  - Hypertensive urgency  - Anemia  - likely from chronic disease but drop from 13 to 9 over 3 month  - DM 2  - Schizophrenia    Plan   -Admit to Medicine  - Treatment of hypertensive urgency with IV labetalol 10mg X 1 dose to bring BP to patient's range; then continue home regimen  - Continue serial nebs duonebs or albuterol MDI as needed. Pt already improving. Low dose oral prednisone taper. O2 supplements and wean to home level.  - Anemia work up and discussion with nephrologist and confirmation regarding erythropoietin which pt states he does not receive.  - DM 2 - continue home regimen  - Schizophrenia - as above- no acute issues

## 2020-06-08 NOTE — H&P ADULT - NSICDXPASTMEDICALHX_GEN_ALL_CORE_FT
PAST MEDICAL HISTORY:  Asthma     Renal failure PAST MEDICAL HISTORY:  Anxiety     Asthma     Asthma with COPD     Bipolar disorder     Diabetes mellitus, type 2     Hypertension     Renal failure     Schizophrenia

## 2020-06-08 NOTE — H&P ADULT - PROBLEM SELECTOR PROBLEM 4
Anemia due to chronic kidney disease, on chronic dialysis Type 2 diabetes mellitus with other specified complication, unspecified whether long term insulin use ESRD (end stage renal disease)

## 2020-06-08 NOTE — H&P ADULT - HISTORY OF PRESENT ILLNESS
Patient is a 40M with a PMHx of asthma, ESRD, XXX p/w acute shortness of breath and wheezing.     GOC: Full Code Patient is a 40M with a PMHx of asthma (intubated once), COPD, ESRD (MWF via Left AVF), HTN, DM2, Schizophrenia, Anxiety, and Bipolar Disorder p/w acute shortness of breath and wheezing. Patient stated that as he was about to go to bed to get ready for HD at 4 am he got short of breath around 10 pm. Patient went to use his albuterol machine however the compartment that allows for medication broke. He attempted to use additional machines however they were not working either which prompted his mother to call 911 to admit him for an asthma exacerbation. Patient endorses abdominal pain of 1 week along with new nausea. He denies chest pain, vomiting, diarrhea, constipation, or myalgias.     GOC: Full Code

## 2020-06-08 NOTE — H&P ADULT - PROBLEM SELECTOR PLAN 5
Resumption of HD in the community Hgb 9.2  f/u Anemia Panel (TIBC, Ferritin, Iron Total)  Due to ESRD  Consider Epogen with HD  Nephrology Consult: Dr. La Patient states that his blood sugar and DM2 is under control  f/u HgbA1c  c/w Low Dose ISS  Diet: DASH/Renal/Consistent Carbs

## 2020-06-08 NOTE — H&P ADULT - PROBLEM SELECTOR PLAN 4
Hgb 9.2  f/u Anemia Panel (TIBC, Ferritin, Iron Total)  Due to ESRD  Consider Epogen with HD  Nephrology Consult: Patient states that his blood sugar and DM2 is under control  f/u HgbA1c  c/w Low Dose ISS  Diet: DASH/Renal/Consistent Carbs History of ESRD  HD in [place]   BUN:Cr (67/10.7)  HD will need to be done in the am

## 2020-06-08 NOTE — H&P ADULT - ASSESSMENT
Patient is a 40M with a PMHx of asthma, ESRD, XXX p/w acute shortness of breath and wheezing.     GOC: Full Code    Vitals: 203/95 BP, 113 HR, 92% on 4L NC    BNP: 49689  BUN:Cr (67/10.7)  AB.26 / 279 O2 / 45 CO2  Hgb: 9.2    s/p 25 mg Hydralazine  1 course of Duonebs Patient is a 40M with a PMHx of asthma (intubated once), COPD, ESRD (MWF via Left AVF), HTN, DM2, Schizophrenia, Anxiety, and Bipolar Disorder p/w acute shortness of breath and wheezing.    GOC: Full Code    Vitals: 203/95 BP, 113 HR, 92% on 4L NC    BNP: 51702  BUN:Cr (67/10.7)  AB.26 / 279 O2 / 45 CO2  Hgb: 9.2    s/p 25 mg Hydralazine and 10 mg of Labetalol  1 course of Duonebs

## 2020-06-08 NOTE — H&P ADULT - PROBLEM SELECTOR PROBLEM 5
Social problem Anemia due to chronic kidney disease, on chronic dialysis Type 2 diabetes mellitus with other specified complication, unspecified whether long term insulin use

## 2020-06-08 NOTE — H&P ADULT - PROBLEM SELECTOR PROBLEM 1
Severe asthma, unspecified whether complicated, unspecified whether persistent Acute on chronic respiratory failure with hypoxia

## 2020-06-08 NOTE — H&P ADULT - NSHPSOCIALHISTORY_GEN_ALL_CORE
Smoked 2 ppd for 24 years and stopped 7 months ago.     Previous EtOH abuse and stopped drinking months ago.     Utilized nasal coccaine and stopped 1 year ago.     Lives at home with mother and father and is on disability.     Ambulates with a cane/walker when there is respiratory exertion.

## 2020-06-08 NOTE — CONSULT NOTE ADULT - SUBJECTIVE AND OBJECTIVE BOX
[facility] : Progress Note :: [phone], (office [phone]),   Dr La / Dr Hui / Dr Barber / Dr Villalobos / Dr Hang CASTELLANO / Dr Mehta / Dr Sanchez / Dr Alber dumont  _____________________________________________________________________________________________  Patient is a 40y Male with ESRD on HD MWF. Family called 911 this morning as he was short of breath ( from acute asthma exercebation). His nebulizer machine is broken. In ED SOB improved with nebulization. Renal consulted as has ESRD    PAST MEDICAL & SURGICAL HISTORY:  Anxiety  Bipolar disorder  Schizophrenia  Hypertension  Diabetes mellitus, type 2  Asthma with COPD  Renal failure  Asthma  AVF (arteriovenous fistula)    penicillin (Rash)  shellfish (Rash)    Home Medications Reviewed  Hospital Medications:   MEDICATIONS  (STANDING):  amLODIPine   Tablet 5 milliGRAM(s) Oral daily  atorvastatin 40 milliGRAM(s) Oral at bedtime  clopidogrel Tablet 75 milliGRAM(s) Oral daily  epoetin-cyndie-epbx (RETACRIT) Injectable 63089 Unit(s) IV Push <User Schedule>  heparin   Injectable 5000 Unit(s) SubCutaneous every 12 hours  hydrALAZINE 25 milliGRAM(s) Oral three times a day  insulin lispro (HumaLOG) corrective regimen sliding scale   SubCutaneous Before meals and at bedtime  montelukast 10 milliGRAM(s) Oral at bedtime  pantoprazole    Tablet 40 milliGRAM(s) Oral before breakfast  PARoxetine 20 milliGRAM(s) Oral daily  risperiDONE   Tablet 1 milliGRAM(s) Oral daily    SOCIAL HISTORY:  Denies ETOh,Smoking,   FAMILY HISTORY:        VITALS:  T(F): 97.8 (06-08-20 @ 18:30), Max: 98.7 (06-08-20 @ 04:18)  HR: 67 (06-08-20 @ 18:30)  BP: 177/97 (06-08-20 @ 18:30)  RR: 20 (06-08-20 @ 15:30)  SpO2: 100% (06-08-20 @ 15:30)  Wt(kg): --      Weight (kg): 165.1 (06-07 @ 23:20)  PHYSICAL EXAM:  Constitutional: NAD  HEENT: anicteric sclera, oropharynx clear, MMM  Neck: No JVD  Respiratory: CTAB, no wheezes, rales or rhonchi  Cardiovascular: S1, S2, RRR  Gastrointestinal: BS+, soft, NT/ND  Extremities: No peripheral edema  Neurological: A/O x 3, no focal deficit  : No CVA tenderness. No heranndez.   Skin: No rashes, stasis changes  Vascular Access: LEUVF with thrill and bruit    LABS:  06-08    137  |  100  |  73<H>  ----------------------------<  227<H>  4.5   |  19<L>  |  11.60<H>    Ca    10.2      08 Jun 2020 06:29  Phos  7.6     06-08  Mg     2.4     06-08      Creatinine Trend: 11.60 <--, 10.70 <--                        8.9    9.12  )-----------( 233      ( 08 Jun 2020 06:29 )             28.2     Urine Studies:      RADIOLOGY & ADDITIONAL STUDIES:

## 2020-06-08 NOTE — H&P ADULT - PROBLEM SELECTOR PLAN 1
Patient has a history of asthma and p/w acute asthma exacerbation  RR 27 initially then improved to 24 with 92%NC on 4L  AB.26 / 279 O2 / 45 CO2  s/p Duonebs  Titrate off of nasal canula  Albuterol PRN  Consider IV Solumedrol  No WBC or signs of infection Patient has a history of asthma & COPD and p/w acute asthma exacerbation  RR 27 initially then improved to 24 with 92%NC on 4L  AB.26 / 279 O2 / 45 CO2  s/p Duonebs  Titrate off of nasal canula  Albuterol PRN  Consider IV Solumedrol  No WBC or signs of infection

## 2020-06-08 NOTE — ED ADULT NURSE NOTE - NSIMPLEMENTINTERV_GEN_ALL_ED
Implemented All Universal Safety Interventions:  [place] to call system. Call bell, personal items and telephone within reach. Instruct patient to call for assistance. Room bathroom lighting operational. Non-slip footwear when patient is off stretcher. Physically safe environment: no spills, clutter or unnecessary equipment. Stretcher in lowest position, wheels locked, appropriate side rails in place.

## 2020-06-08 NOTE — H&P ADULT - PROBLEM SELECTOR PLAN 3
History of ESRD  HD in [place]   BUN:Cr (67/10.7)  HD will need to be done in the am 203/95 BP on admission   s/p Hydralazine 25 mg and 10 mg of Labetalol  HR: 113 (2/2 Duonebs)  c/w BP medications with parameters  Monitor BP

## 2020-06-08 NOTE — H&P ADULT - PROBLEM SELECTOR PROBLEM 6
DVT prophylaxis Schizophrenia, unspecified type Anemia due to chronic kidney disease, on chronic dialysis

## 2020-06-08 NOTE — CHART NOTE - NSCHARTNOTEFT_GEN_A_CORE
EVENT:  Pt admitted for asthma exacerbation      HPI:  Patient is a 40M with a PMHx of asthma (intubated once), COPD, ESRD (MWF via Left AVF), HTN, DM2, Schizophrenia, Anxiety, and Bipolar Disorder p/w acute shortness of breath and wheezing. Patient stated that as he was about to go to bed to get ready for HD at 4 am he got short of breath around 10 pm. Patient went to use his albuterol machine however the compartment that allows for medication broke. He attempted to use additional machines however they were not working either which prompted his mother to call 911 to admit him for an asthma exacerbation. Patient endorses abdominal pain of 1 week along with new nausea. He denies chest pain, vomiting, diarrhea, constipation, or myalgias.     GOC: Full Code (08 Jun 2020 03:28)        OBJECTIVE:  Vital Signs Last 24 Hrs  T(C): 36.3 (08 Jun 2020 15:30), Max: 37.1 (08 Jun 2020 04:18)  T(F): 97.4 (08 Jun 2020 15:30), Max: 98.7 (08 Jun 2020 04:18)  HR: 70 (08 Jun 2020 15:30) (70 - 113)  BP: 172/86 (08 Jun 2020 15:30) (136/85 - 203/95)  BP(mean): 103 (08 Jun 2020 15:30) (103 - 103)  RR: 20 (08 Jun 2020 15:30) (20 - 27)  SpO2: 100% (08 Jun 2020 15:30) (92% - 100%)    LABS:                        8.9    9.12  )-----------( 233      ( 08 Jun 2020 06:29 )             28.2   CARDIAC MARKERS ( 08 Jun 2020 06:29 )  0.056 ng/mL / x     / x     / x     / x        06-08    137  |  100  |  73<H>  ----------------------------<  227<H>  4.5   |  19<L>  |  11.60<H>    Ca    10.2      08 Jun 2020 06:29  Phos  7.6     06-08  Mg     2.4     06-08        ASSESSMENT:    PLAN:   Asthma - Continue with Duonebs, titrate O2  Hypertensive Urgency - Continue with Norvasc and Hydralazine, DASH/Renal/Carb controlled diet, and monitor BP   ESRD - Dialysis today, Continue with DASH/Renal/Carb controlled diet, Dr. La (Nephrology) following  DM - Controlled, A1c 5.2, Continue with SSI, DASH/Renal/Carb controlled diet, and finger sticks.  Schizophrenia - Continue with Risperidone  DC Planning - Will need HD resumed in the community  VTE - Continue with SQ heparin

## 2020-06-08 NOTE — ED PROVIDER NOTE - OBJECTIVE STATEMENT
Chief complaint of shortness of breath / wheezing at 10pm, no fever, no chest pain, no vomiting. Pt received Duonebs and solumedrol by EMS and currently  feels better.  Pt states has sufficient albuterol and ventolin,. Pt states will be able to go to HD at [place] in am.

## 2020-06-09 ENCOUNTER — TRANSCRIPTION ENCOUNTER (OUTPATIENT)
Age: 40
End: 2020-06-09

## 2020-06-09 VITALS
DIASTOLIC BLOOD PRESSURE: 87 MMHG | SYSTOLIC BLOOD PRESSURE: 155 MMHG | RESPIRATION RATE: 18 BRPM | TEMPERATURE: 98 F | HEART RATE: 85 BPM | OXYGEN SATURATION: 100 %

## 2020-06-09 LAB
A1C WITH ESTIMATED AVERAGE GLUCOSE RESULT: 5.2 % — SIGNIFICANT CHANGE UP (ref 4–5.6)
ANION GAP SERPL CALC-SCNC: 15 MMOL/L — SIGNIFICANT CHANGE UP (ref 5–17)
BUN SERPL-MCNC: 66 MG/DL — HIGH (ref 7–18)
CALCIUM SERPL-MCNC: 10.2 MG/DL — SIGNIFICANT CHANGE UP (ref 8.4–10.5)
CHLORIDE SERPL-SCNC: 96 MMOL/L — SIGNIFICANT CHANGE UP (ref 96–108)
CO2 SERPL-SCNC: 24 MMOL/L — SIGNIFICANT CHANGE UP (ref 22–31)
CREAT SERPL-MCNC: 8.5 MG/DL — HIGH (ref 0.5–1.3)
ESTIMATED AVERAGE GLUCOSE: 103 MG/DL — SIGNIFICANT CHANGE UP (ref 68–114)
GLUCOSE BLDC GLUCOMTR-MCNC: 114 MG/DL — HIGH (ref 70–99)
GLUCOSE BLDC GLUCOMTR-MCNC: 156 MG/DL — HIGH (ref 70–99)
GLUCOSE BLDC GLUCOMTR-MCNC: 97 MG/DL — SIGNIFICANT CHANGE UP (ref 70–99)
GLUCOSE SERPL-MCNC: 113 MG/DL — HIGH (ref 70–99)
HBV SURFACE AB SER-ACNC: REACTIVE
HBV SURFACE AG SER-ACNC: SIGNIFICANT CHANGE UP
HCT VFR BLD CALC: 29 % — LOW (ref 39–50)
HGB BLD-MCNC: 9.1 G/DL — LOW (ref 13–17)
MCHC RBC-ENTMCNC: 29.9 PG — SIGNIFICANT CHANGE UP (ref 27–34)
MCHC RBC-ENTMCNC: 31.4 GM/DL — LOW (ref 32–36)
MCV RBC AUTO: 95.4 FL — SIGNIFICANT CHANGE UP (ref 80–100)
NRBC # BLD: 0 /100 WBCS — SIGNIFICANT CHANGE UP (ref 0–0)
PLATELET # BLD AUTO: 219 K/UL — SIGNIFICANT CHANGE UP (ref 150–400)
POTASSIUM SERPL-MCNC: 4.2 MMOL/L — SIGNIFICANT CHANGE UP (ref 3.5–5.3)
POTASSIUM SERPL-SCNC: 4.2 MMOL/L — SIGNIFICANT CHANGE UP (ref 3.5–5.3)
RBC # BLD: 3.04 M/UL — LOW (ref 4.2–5.8)
RBC # FLD: 15.5 % — HIGH (ref 10.3–14.5)
SODIUM SERPL-SCNC: 135 MMOL/L — SIGNIFICANT CHANGE UP (ref 135–145)
WBC # BLD: 10.2 K/UL — SIGNIFICANT CHANGE UP (ref 3.8–10.5)
WBC # FLD AUTO: 10.2 K/UL — SIGNIFICANT CHANGE UP (ref 3.8–10.5)

## 2020-06-09 PROCEDURE — 84436 ASSAY OF TOTAL THYROXINE: CPT

## 2020-06-09 PROCEDURE — U0003: CPT

## 2020-06-09 PROCEDURE — 36415 COLL VENOUS BLD VENIPUNCTURE: CPT

## 2020-06-09 PROCEDURE — 82607 VITAMIN B-12: CPT

## 2020-06-09 PROCEDURE — 84100 ASSAY OF PHOSPHORUS: CPT

## 2020-06-09 PROCEDURE — 83880 ASSAY OF NATRIURETIC PEPTIDE: CPT

## 2020-06-09 PROCEDURE — 71045 X-RAY EXAM CHEST 1 VIEW: CPT

## 2020-06-09 PROCEDURE — 85045 AUTOMATED RETICULOCYTE COUNT: CPT

## 2020-06-09 PROCEDURE — 99261: CPT

## 2020-06-09 PROCEDURE — 86706 HEP B SURFACE ANTIBODY: CPT

## 2020-06-09 PROCEDURE — 82728 ASSAY OF FERRITIN: CPT

## 2020-06-09 PROCEDURE — 99053 MED SERV 10PM-8AM 24 HR FAC: CPT

## 2020-06-09 PROCEDURE — 80061 LIPID PANEL: CPT

## 2020-06-09 PROCEDURE — 94640 AIRWAY INHALATION TREATMENT: CPT

## 2020-06-09 PROCEDURE — 83615 LACTATE (LD) (LDH) ENZYME: CPT

## 2020-06-09 PROCEDURE — 83540 ASSAY OF IRON: CPT

## 2020-06-09 PROCEDURE — 82306 VITAMIN D 25 HYDROXY: CPT

## 2020-06-09 PROCEDURE — 80048 BASIC METABOLIC PNL TOTAL CA: CPT

## 2020-06-09 PROCEDURE — 85610 PROTHROMBIN TIME: CPT

## 2020-06-09 PROCEDURE — 99239 HOSP IP/OBS DSCHRG MGMT >30: CPT

## 2020-06-09 PROCEDURE — 84443 ASSAY THYROID STIM HORMONE: CPT

## 2020-06-09 PROCEDURE — 85730 THROMBOPLASTIN TIME PARTIAL: CPT

## 2020-06-09 PROCEDURE — 83550 IRON BINDING TEST: CPT

## 2020-06-09 PROCEDURE — 82746 ASSAY OF FOLIC ACID SERUM: CPT

## 2020-06-09 PROCEDURE — 83036 HEMOGLOBIN GLYCOSYLATED A1C: CPT

## 2020-06-09 PROCEDURE — 82803 BLOOD GASES ANY COMBINATION: CPT

## 2020-06-09 PROCEDURE — 83735 ASSAY OF MAGNESIUM: CPT

## 2020-06-09 PROCEDURE — 85027 COMPLETE CBC AUTOMATED: CPT

## 2020-06-09 PROCEDURE — 99285 EMERGENCY DEPT VISIT HI MDM: CPT | Mod: 25

## 2020-06-09 PROCEDURE — 82962 GLUCOSE BLOOD TEST: CPT

## 2020-06-09 PROCEDURE — 84484 ASSAY OF TROPONIN QUANT: CPT

## 2020-06-09 PROCEDURE — 93005 ELECTROCARDIOGRAM TRACING: CPT

## 2020-06-09 PROCEDURE — 87340 HEPATITIS B SURFACE AG IA: CPT

## 2020-06-09 PROCEDURE — 97161 PT EVAL LOW COMPLEX 20 MIN: CPT

## 2020-06-09 RX ORDER — ATORVASTATIN CALCIUM 80 MG/1
1 TABLET, FILM COATED ORAL
Qty: 30 | Refills: 0
Start: 2020-06-09 | End: 2020-07-08

## 2020-06-09 RX ORDER — MONTELUKAST 4 MG/1
1 TABLET, CHEWABLE ORAL
Qty: 30 | Refills: 0
Start: 2020-06-09 | End: 2020-07-08

## 2020-06-09 RX ORDER — HYDROMORPHONE HYDROCHLORIDE 2 MG/ML
2 INJECTION INTRAMUSCULAR; INTRAVENOUS; SUBCUTANEOUS ONCE
Refills: 0 | Status: DISCONTINUED | OUTPATIENT
Start: 2020-06-09 | End: 2020-06-09

## 2020-06-09 RX ORDER — CLOPIDOGREL BISULFATE 75 MG/1
1 TABLET, FILM COATED ORAL
Qty: 30 | Refills: 0
Start: 2020-06-09 | End: 2020-07-08

## 2020-06-09 RX ORDER — HYDRALAZINE HCL 50 MG
1 TABLET ORAL
Qty: 90 | Refills: 0
Start: 2020-06-09 | End: 2020-07-08

## 2020-06-09 RX ORDER — RISPERIDONE 4 MG/1
2 TABLET ORAL
Qty: 0 | Refills: 0 | DISCHARGE
Start: 2020-06-09 | End: 2020-07-08

## 2020-06-09 RX ORDER — AMLODIPINE BESYLATE 2.5 MG/1
1 TABLET ORAL
Qty: 30 | Refills: 0
Start: 2020-06-09 | End: 2020-07-08

## 2020-06-09 RX ORDER — ALBUTEROL 90 UG/1
2 AEROSOL, METERED ORAL
Qty: 0 | Refills: 0 | DISCHARGE
Start: 2020-06-09

## 2020-06-09 RX ORDER — RISPERIDONE 4 MG/1
1 TABLET ORAL
Qty: 30 | Refills: 0
Start: 2020-06-09 | End: 2020-07-08

## 2020-06-09 RX ORDER — PANTOPRAZOLE SODIUM 20 MG/1
1 TABLET, DELAYED RELEASE ORAL
Qty: 30 | Refills: 0
Start: 2020-06-09 | End: 2020-07-08

## 2020-06-09 RX ADMIN — Medication 25 MILLIGRAM(S): at 13:57

## 2020-06-09 RX ADMIN — Medication 25 MILLIGRAM(S): at 05:55

## 2020-06-09 RX ADMIN — HEPARIN SODIUM 5000 UNIT(S): 5000 INJECTION INTRAVENOUS; SUBCUTANEOUS at 05:55

## 2020-06-09 RX ADMIN — ATORVASTATIN CALCIUM 40 MILLIGRAM(S): 80 TABLET, FILM COATED ORAL at 00:23

## 2020-06-09 RX ADMIN — CLOPIDOGREL BISULFATE 75 MILLIGRAM(S): 75 TABLET, FILM COATED ORAL at 12:42

## 2020-06-09 RX ADMIN — RISPERIDONE 1 MILLIGRAM(S): 4 TABLET ORAL at 12:42

## 2020-06-09 RX ADMIN — MONTELUKAST 10 MILLIGRAM(S): 4 TABLET, CHEWABLE ORAL at 00:22

## 2020-06-09 RX ADMIN — AMLODIPINE BESYLATE 5 MILLIGRAM(S): 2.5 TABLET ORAL at 05:56

## 2020-06-09 RX ADMIN — Medication 1: at 12:42

## 2020-06-09 RX ADMIN — HYDROMORPHONE HYDROCHLORIDE 2 MILLIGRAM(S): 2 INJECTION INTRAMUSCULAR; INTRAVENOUS; SUBCUTANEOUS at 01:30

## 2020-06-09 RX ADMIN — Medication 25 MILLIGRAM(S): at 00:22

## 2020-06-09 RX ADMIN — Medication 20 MILLIGRAM(S): at 12:42

## 2020-06-09 RX ADMIN — PANTOPRAZOLE SODIUM 40 MILLIGRAM(S): 20 TABLET, DELAYED RELEASE ORAL at 05:59

## 2020-06-09 NOTE — PROGRESS NOTE ADULT - PROBLEM SELECTOR PLAN 3
203/95 BP on admission   s/p Hydralazine 25 mg and 10 mg of Labetalol  HR: 113 (2/2 Duonebs)  c/w BP medications with parameters  Monitor BP

## 2020-06-09 NOTE — PROGRESS NOTE ADULT - PROBLEM SELECTOR PLAN 6
Hgb 9.2  f/u Anemia Panel (TIBC, Ferritin, Iron Total)  Due to ESRD  Consider Epogen with HD  Nephrology Consult: Dr. La

## 2020-06-09 NOTE — PROGRESS NOTE ADULT - ASSESSMENT
Patient is a 40y Male with ESRD on HD MWF. Family called 911 this morning as he was short of breath ( from acute asthma exercebation). His nebulizer machine is broken. In ED SOB improved with nebulization. Renal consulted as has ESRD    #ESRD. S/P  HD yesterday. BP was elevated in HD. UF was increased  #HTN. Uncontrolled. no objection to incr dose of hydralazine.  # lytes/acid base at goal.  # anemia of CKD . Procrit on HD   D/C planning

## 2020-06-09 NOTE — PROGRESS NOTE ADULT - SUBJECTIVE AND OBJECTIVE BOX
MEDICAL ATTENDING NOTE  Patient is a 40y old  Male who presents with a chief complaint of Shortness of Breath, Hypertension (09 Jun 2020 15:41)      HPI:  Patient is a 40M with a PMHx of asthma (intubated once), COPD, ESRD (MWF via Left AVF), HTN, DM2, Schizophrenia, Anxiety, and Bipolar Disorder p/w acute shortness of breath and wheezing. Patient stated that as he was about to go to bed to get ready for HD at 4 am he got short of breath around 10 pm. Patient went to use his albuterol machine however the compartment that allows for medication broke. He attempted to use additional machines however they were not working either which prompted his mother to call 911 to admit him for an asthma exacerbation. Patient endorses abdominal pain of 1 week along with new nausea. He denies chest pain, vomiting, diarrhea, constipation, or myalgias.     GOC: Full Code (08 Jun 2020 03:28)      INTERVAL HPI/OVERNIGHT EVENTS: no new complaints    MEDICATIONS  (STANDING):  amLODIPine   Tablet 5 milliGRAM(s) Oral daily  atorvastatin 40 milliGRAM(s) Oral at bedtime  clopidogrel Tablet 75 milliGRAM(s) Oral daily  epoetin-cyndie-epbx (RETACRIT) Injectable 77201 Unit(s) IV Push <User Schedule>  heparin   Injectable 5000 Unit(s) SubCutaneous every 12 hours  hydrALAZINE 25 milliGRAM(s) Oral three times a day  insulin lispro (HumaLOG) corrective regimen sliding scale   SubCutaneous Before meals and at bedtime  montelukast 10 milliGRAM(s) Oral at bedtime  pantoprazole    Tablet 40 milliGRAM(s) Oral before breakfast  PARoxetine 20 milliGRAM(s) Oral daily  risperiDONE   Tablet 1 milliGRAM(s) Oral daily    MEDICATIONS  (PRN):  ALBUTerol    90 MICROgram(s) HFA Inhaler 2 Puff(s) Inhalation every 6 hours PRN Shortness of Breath and/or Wheezing      __________________________________________________  REVIEW OF SYSTEMS:    CONSTITUTIONAL: No fever,   EYES: no acute visual disturbances  NECK: No pain or stiffness  RESPIRATORY: No cough; SOB is much improved  CARDIOVASCULAR: No chest pain, no palpitations  GASTROINTESTINAL: No pain. No nausea or vomiting; No diarrhea   NEUROLOGICAL: No headache or numbness, no tremors  MUSCULOSKELETAL: No joint pain, no muscle pain  GENITOURINARY: no dysuria, no frequency, no hesitancy  PSYCHIATRY: no depression , no anxiety  ALL OTHER  ROS negative        Vital Signs Last 24 Hrs  T(C): 36.9 (09 Jun 2020 15:23), Max: 37.1 (09 Jun 2020 00:26)  T(F): 98.5 (09 Jun 2020 15:23), Max: 98.7 (09 Jun 2020 00:26)  HR: 85 (09 Jun 2020 15:23) (67 - 85)  BP: 155/87 (09 Jun 2020 15:23) (147/74 - 178/93)  BP(mean): --  RR: 18 (09 Jun 2020 15:23) (18 - 20)  SpO2: 100% (09 Jun 2020 15:23) (98% - 100%)    ________________________________________________  PHYSICAL EXAM:  GENERAL: Obese, alert man using nasal oxygen, able to ambulate  HEENT: Normocephalic;  conjunctivae and sclerae clear; moist mucous membranes;   NECK : supple  CHEST/LUNG: Clear to auscultation bilaterally with good air entry   HEART: S1 S2  regular; no murmurs, gallops or rubs  ABDOMEN: Soft, Nontender, Nondistended; Bowel sounds present  EXTREMITIES: no cyanosis; no edema; no calf tenderness  SKIN: warm and dry; no rash  NERVOUS SYSTEM:  Awake and alert; Oriented  to place, person and time ; no new deficits    _________________________________________________  LABS:                        9.1    10.20 )-----------( 219      ( 09 Jun 2020 06:41 )             29.0     06-09    135  |  96  |  66<H>  ----------------------------<  113<H>  4.2   |  24  |  8.50<H>    Ca    10.2      09 Jun 2020 06:41  Phos  7.6     06-08  Mg     2.4     06-08      PT/INR - ( 08 Jun 2020 06:29 )   PT: 12.0 sec;   INR: 1.06 ratio         PTT - ( 08 Jun 2020 00:10 )  PTT:33.4 sec    CAPILLARY BLOOD GLUCOSE      POCT Blood Glucose.: 156 mg/dL (09 Jun 2020 12:09)  POCT Blood Glucose.: 114 mg/dL (09 Jun 2020 08:22)  POCT Blood Glucose.: 119 mg/dL (08 Jun 2020 21:02)

## 2020-06-09 NOTE — DISCHARGE NOTE PROVIDER - HOSPITAL COURSE
Patient is a 40M with a PMHx of asthma (intubated once), COPD, ESRD (MWF via Left AVF), HTN, DM2, Schizophrenia, Anxiety, and Bipolar Disorder p/w acute shortness of breath and wheezing. Patient stated that as he was about to go to bed to get ready for HD at 4 am he got short of breath around 10 pm. Patient went to use his albuterol machine however the compartment that allows for medication broke. He attempted to use additional machines however they were not working either which prompted his mother to call 911 to admit him for an asthma exacerbation. Patient endorses abdominal pain of 1 week along with new nausea.     Pt was admitted for Acute on chronic respiratory failure with hypoxia due to Acute asthma exacerbation    RR 27 initially then improved to 24 with 92%NC on 4L    AB.26 / 279 O2 / 45 CO2    Pt got albuterol MDI as needed.    Pt got HD on 20    BP was 203/95 on admission     s/p Hydralazine 25 mg and 10 mg of Labetalol Patient is a 40M with a PMHx of asthma (intubated once), COPD, ESRD (MWF via Left AVF), HTN, DM2, Schizophrenia, Anxiety, and Bipolar Disorder p/w acute shortness of breath and wheezing. Patient stated that as he was about to go to bed to get ready for HD at 4 am he got short of breath around 10 pm. Patient went to use his albuterol machine however the compartment that allows for medication broke. He attempted to use additional machines however they were not working either which prompted his mother to call 911 to admit him for an asthma exacerbation. Patient endorses abdominal pain of 1 week along with new nausea.     Pt was admitted for Acute on chronic respiratory failure with hypoxia due to Acute asthma exacerbation    RR 27 initially then improved to 24 with 92%NC on 4L    AB.26 / 279 O2 / 45 CO2    Pt got albuterol MDI as needed.    Pt got HD on 20    BP was 203/95 on admission     s/p Hydralazine 25 mg and 10 mg of Labetalol    Pt medically stable for discharge.

## 2020-06-09 NOTE — PHYSICAL THERAPY INITIAL EVALUATION ADULT - GENERAL OBSERVATIONS, REHAB EVAL
Consult received, EMR, radiology and labs reviewed. Patient received supine in bed, obese, on NCO2 3L. Patient agreed to EVALUATION from Physical Therapist.

## 2020-06-09 NOTE — DISCHARGE NOTE NURSING/CASE MANAGEMENT/SOCIAL WORK - PATIENT PORTAL LINK FT
You can access the FollowMyHealth Patient Portal offered by [facility] by registering at the following website: [email or web address]. By joining [employer]’s FollowMyHealth portal, you will also be able to view your health information using other applications (apps) compatible with our system.

## 2020-06-09 NOTE — PROGRESS NOTE ADULT - PROBLEM SELECTOR PLAN 2
Patient has a history of asthma & COPD and p/w acute asthma exacerbation  RR 27 initially then improved to 24 with 92%NC on 4L  AB.26 / 279 O2 / 45 CO2  s/p Duonebs  Titrate off of nasal canula  Albuterol PRN  Consider IV Solumedrol  No WBC or signs of infection

## 2020-06-09 NOTE — DISCHARGE NOTE PROVIDER - NSDCMRMEDTOKEN_GEN_ALL_CORE_FT
albuterol 90 mcg/inh inhalation aerosol: 2 puff(s) inhaled every 6 hours, As needed, Shortness of Breath and/or Wheezing  amLODIPine 5 mg oral tablet: 1 tab(s) orally once a day  atorvastatin 40 mg oral tablet: 1 tab(s) orally once a day (at bedtime)  clopidogrel 75 mg oral tablet: 1 tab(s) orally once a day  hydrALAZINE 50 mg oral tablet: 1 tab(s) orally every 8 hours   montelukast 10 mg oral tablet: 1 tab(s) orally once a day (at bedtime)  pantoprazole 40 mg oral delayed release tablet: 1 tab(s) orally once a day (before a meal)  PARoxetine 20 mg oral tablet: 1 tab(s) orally once a day  risperiDONE 1 mg oral tablet: 1 tab(s) orally once a day

## 2020-06-09 NOTE — PROGRESS NOTE ADULT - ASSESSMENT
Patient is a 40M with a PMHx of asthma (intubated once), COPD, ESRD (MWF via Left AVF), HTN, DM2, Schizophrenia, Anxiety, and Bipolar Disorder p/w acute shortness of breath and wheezing.    GOC: Full Code    Vitals: 203/95 BP, 113 HR, 92% on 4L NC    BNP: 47384  BUN:Cr (67/10.7)  AB.26 / 279 O2 / 45 CO2  Hgb: 9.2    s/p 25 mg Hydralazine and 10 mg of Labetalol  1 course of Duonebs

## 2020-06-09 NOTE — PROGRESS NOTE ADULT - ATTENDING COMMENTS
Pt seen at bedside with resident and PT.   Vital Signs Last 24 Hrs  T(C): 36.9 (09 Jun 2020 15:23), Max: 37.1 (09 Jun 2020 00:26)  T(F): 98.5 (09 Jun 2020 15:23), Max: 98.7 (09 Jun 2020 00:26)  HR: 85 (09 Jun 2020 15:23) (67 - 85)  BP: 155/87 (09 Jun 2020 15:23) (147/74 - 178/93)  BP(mean): --  RR: 18 (09 Jun 2020 15:23) (18 - 20)  SpO2: 100% (09 Jun 2020 15:23) (98% - 100%)      Exam   Young obese man NAD on o2 supplements  AAO X 3 talking in full sentences  CTA B/L RRR S1S2 only  Soft NT ND BS +  AV fistula site +  No focal deficits                        9.1    10.20 )-----------( 219      ( 09 Jun 2020 06:41 )             29.0   06-09    135  |  96  |  66<H>  ----------------------------<  113<H>  4.2   |  24  |  8.50<H>    Ca    10.2      09 Jun 2020 06:41  Phos  7.6     06-08  Mg     2.4     06-08        CXR concerning for B/L pulmonary edema with widespread interstitial infiltrates    Impression  40 year old man with extensive medical hx including ESRD on HD, asthma presenting in acute resp failure from acute asthma eacerbation.    IMP:  Acute on chronic respiratory failure, asthma exacerbation in a patient dependent on home oxygen          Hypertension, suboptimal control  -       anemia secondary to kidney failure  -       DM 2, stable  Plan: increase hydralazine to 50 mg q 8 hours          discharge home          resume outpatient dialysis tomorrow.

## 2020-06-09 NOTE — PROGRESS NOTE ADULT - PROBLEM SELECTOR PLAN 5
Patient states that his blood sugar and DM2 is under control  f/u HgbA1c  c/w Low Dose ISS  Diet: DASH/Renal/Consistent Carbs

## 2020-06-09 NOTE — PROGRESS NOTE ADULT - SUBJECTIVE AND OBJECTIVE BOX
[facility] : Progress Note :: [phone], (office [phone]),   Dr La / Dr Hui / Dr Barber / Dr Villalobos / Dr Hang CASTELLANO / Dr Mehta / Dr Sanchez / Dr Alber dumont  _____________________________________________________________________________________________    s/p HD yesterday.   uncontrolled BP     penicillin (Rash)  shellfish (Rash)    Hospital Medications:   MEDICATIONS  (STANDING):  amLODIPine   Tablet 5 milliGRAM(s) Oral daily  atorvastatin 40 milliGRAM(s) Oral at bedtime  clopidogrel Tablet 75 milliGRAM(s) Oral daily  epoetin-cyndie-epbx (RETACRIT) Injectable 86121 Unit(s) IV Push <User Schedule>  heparin   Injectable 5000 Unit(s) SubCutaneous every 12 hours  hydrALAZINE 25 milliGRAM(s) Oral three times a day  insulin lispro (HumaLOG) corrective regimen sliding scale   SubCutaneous Before meals and at bedtime  montelukast 10 milliGRAM(s) Oral at bedtime  pantoprazole    Tablet 40 milliGRAM(s) Oral before breakfast  PARoxetine 20 milliGRAM(s) Oral daily  risperiDONE   Tablet 1 milliGRAM(s) Oral daily    VITALS:  T(F): 97.7 (06-09-20 @ 11:55), Max: 98.7 (06-09-20 @ 00:26)  HR: 73 (06-09-20 @ 11:55)  BP: 178/93 (06-09-20 @ 11:55)  RR: 19 (06-09-20 @ 11:55)  SpO2: 98% (06-09-20 @ 11:55)  Wt(kg): --    06-08 @ 07:01  -  06-09 @ 07:00  --------------------------------------------------------  IN: 300 mL / OUT: 5000 mL / NET: -4700 mL        PHYSICAL EXAM:  Constitutional: NAD  HEENT: anicteric sclera, oropharynx clear.  Neck: No JVD  Respiratory: CTAB, no wheezes, rales or rhonchi  Cardiovascular: S1, S2, RRR  Gastrointestinal: BS+, soft, NT/ND  Extremities: No peripheral edema  Neurological: A/O x 3, no focal deficits  : No CVA tenderness. No hernandez.   Skin: No rashes  Vascular Access: LUEAVF with thrill and bruit    LABS:  06-09    135  |  96  |  66<H>  ----------------------------<  113<H>  4.2   |  24  |  8.50<H>    Ca    10.2      09 Jun 2020 06:41  Phos  7.6     06-08  Mg     2.4     06-08      Creatinine Trend: 8.50 <--, 11.60 <--, 10.70 <--                        9.1    10.20 )-----------( 219      ( 09 Jun 2020 06:41 )             29.0     Urine Studies:      RADIOLOGY & ADDITIONAL STUDIES:

## 2020-06-22 NOTE — CONSULT NOTE ADULT - PROBLEM SELECTOR PROBLEM 1
Refill request received for levothyroxine    According to Olya's note on 11.27.19  Hypothyroidism secondary to I-131 therapy:       TSH (mcUnits/mL)   Date Value   07/26/2019 3.095   Continue Levothyroxine 112 mcg, 8 tablets/week.     Component      Latest Ref Rng & Units 1/30/2020   TSH      0.350 - 5.000 mcUnits/mL 2.972       Follow up appointment 8.20.20 with Alexus  Prescription sent     Acute pain of left knee

## 2020-06-29 ENCOUNTER — EMERGENCY (EMERGENCY)
Facility: HOSPITAL | Age: 40
LOS: 1 days | Discharge: HOME | End: 2020-06-29
Attending: EMERGENCY MEDICINE
Payer: MEDICARE

## 2020-06-29 VITALS
SYSTOLIC BLOOD PRESSURE: 175 MMHG | RESPIRATION RATE: 18 BRPM | TEMPERATURE: 98 F | DIASTOLIC BLOOD PRESSURE: 103 MMHG | OXYGEN SATURATION: 100 % | HEART RATE: 81 BPM

## 2020-06-29 VITALS
DIASTOLIC BLOOD PRESSURE: 99 MMHG | WEIGHT: 315 LBS | TEMPERATURE: 99 F | HEART RATE: 83 BPM | HEIGHT: 78 IN | SYSTOLIC BLOOD PRESSURE: 164 MMHG | RESPIRATION RATE: 18 BRPM | OXYGEN SATURATION: 100 %

## 2020-06-29 DIAGNOSIS — Z98.890 OTHER SPECIFIED POSTPROCEDURAL STATES: Chronic | ICD-10-CM

## 2020-06-29 DIAGNOSIS — I77.0 ARTERIOVENOUS FISTULA, ACQUIRED: Chronic | ICD-10-CM

## 2020-06-29 PROBLEM — E11.9 TYPE 2 DIABETES MELLITUS WITHOUT COMPLICATIONS: Chronic | Status: ACTIVE | Noted: 2020-06-08

## 2020-06-29 PROBLEM — F20.9 SCHIZOPHRENIA, UNSPECIFIED: Chronic | Status: ACTIVE | Noted: 2020-06-08

## 2020-06-29 PROBLEM — F41.9 ANXIETY DISORDER, UNSPECIFIED: Chronic | Status: ACTIVE | Noted: 2020-06-08

## 2020-06-29 PROBLEM — J44.9 CHRONIC OBSTRUCTIVE PULMONARY DISEASE, UNSPECIFIED: Chronic | Status: ACTIVE | Noted: 2020-06-08

## 2020-06-29 PROBLEM — I10 ESSENTIAL (PRIMARY) HYPERTENSION: Chronic | Status: ACTIVE | Noted: 2020-06-08

## 2020-06-29 PROBLEM — N19 UNSPECIFIED KIDNEY FAILURE: Chronic | Status: ACTIVE | Noted: 2020-06-08

## 2020-06-29 LAB
ALBUMIN SERPL ELPH-MCNC: 3.6 G/DL — SIGNIFICANT CHANGE UP (ref 3.5–5)
ALP SERPL-CCNC: 182 U/L — HIGH (ref 40–120)
ALT FLD-CCNC: 20 U/L DA — SIGNIFICANT CHANGE UP (ref 10–60)
ANION GAP SERPL CALC-SCNC: 11 MMOL/L — SIGNIFICANT CHANGE UP (ref 5–17)
AST SERPL-CCNC: 20 U/L — SIGNIFICANT CHANGE UP (ref 10–40)
BILIRUB SERPL-MCNC: 0.4 MG/DL — SIGNIFICANT CHANGE UP (ref 0.2–1.2)
BUN SERPL-MCNC: 78 MG/DL — HIGH (ref 7–18)
CALCIUM SERPL-MCNC: 10.1 MG/DL — SIGNIFICANT CHANGE UP (ref 8.4–10.5)
CHLORIDE SERPL-SCNC: 96 MMOL/L — SIGNIFICANT CHANGE UP (ref 96–108)
CO2 SERPL-SCNC: 28 MMOL/L — SIGNIFICANT CHANGE UP (ref 22–31)
CREAT SERPL-MCNC: 9.42 MG/DL — HIGH (ref 0.5–1.3)
GLUCOSE SERPL-MCNC: 98 MG/DL — SIGNIFICANT CHANGE UP (ref 70–99)
LIDOCAIN IGE QN: 627 U/L — HIGH (ref 73–393)
POTASSIUM SERPL-MCNC: 5.2 MMOL/L — SIGNIFICANT CHANGE UP (ref 3.5–5.3)
POTASSIUM SERPL-SCNC: 5.2 MMOL/L — SIGNIFICANT CHANGE UP (ref 3.5–5.3)
PROT SERPL-MCNC: 8.2 G/DL — SIGNIFICANT CHANGE UP (ref 6–8.3)
SODIUM SERPL-SCNC: 135 MMOL/L — SIGNIFICANT CHANGE UP (ref 135–145)

## 2020-06-29 PROCEDURE — 36415 COLL VENOUS BLD VENIPUNCTURE: CPT

## 2020-06-29 PROCEDURE — 80053 COMPREHEN METABOLIC PANEL: CPT

## 2020-06-29 PROCEDURE — 83690 ASSAY OF LIPASE: CPT

## 2020-06-29 PROCEDURE — 93005 ELECTROCARDIOGRAM TRACING: CPT

## 2020-06-29 PROCEDURE — 71045 X-RAY EXAM CHEST 1 VIEW: CPT | Mod: 26,CS

## 2020-06-29 PROCEDURE — 99284 EMERGENCY DEPT VISIT MOD MDM: CPT | Mod: 25

## 2020-06-29 PROCEDURE — 74176 CT ABD & PELVIS W/O CONTRAST: CPT | Mod: 26

## 2020-06-29 PROCEDURE — 71045 X-RAY EXAM CHEST 1 VIEW: CPT

## 2020-06-29 PROCEDURE — 74176 CT ABD & PELVIS W/O CONTRAST: CPT

## 2020-06-29 PROCEDURE — 99284 EMERGENCY DEPT VISIT MOD MDM: CPT

## 2020-06-29 RX ORDER — OXYCODONE AND ACETAMINOPHEN 5; 325 MG/1; MG/1
1 TABLET ORAL ONCE
Refills: 0 | Status: DISCONTINUED | OUTPATIENT
Start: 2020-06-29 | End: 2020-06-29

## 2020-06-29 RX ORDER — ONDANSETRON 8 MG/1
4 TABLET, FILM COATED ORAL ONCE
Refills: 0 | Status: DISCONTINUED | OUTPATIENT
Start: 2020-06-29 | End: 2020-06-29

## 2020-06-29 RX ORDER — ONDANSETRON 8 MG/1
4 TABLET, FILM COATED ORAL ONCE
Refills: 0 | Status: COMPLETED | OUTPATIENT
Start: 2020-06-29 | End: 2020-06-29

## 2020-06-29 RX ORDER — MORPHINE SULFATE 50 MG/1
4 CAPSULE, EXTENDED RELEASE ORAL ONCE
Refills: 0 | Status: DISCONTINUED | OUTPATIENT
Start: 2020-06-29 | End: 2020-06-29

## 2020-06-29 RX ORDER — SODIUM CHLORIDE 9 MG/ML
1000 INJECTION INTRAMUSCULAR; INTRAVENOUS; SUBCUTANEOUS
Refills: 0 | Status: DISCONTINUED | OUTPATIENT
Start: 2020-06-29 | End: 2020-06-29

## 2020-06-29 RX ADMIN — ONDANSETRON 4 MILLIGRAM(S): 8 TABLET, FILM COATED ORAL at 03:33

## 2020-06-29 RX ADMIN — OXYCODONE AND ACETAMINOPHEN 1 TABLET(S): 5; 325 TABLET ORAL at 03:33

## 2020-06-29 NOTE — ED ADULT NURSE NOTE - CHIEF COMPLAINT QUOTE
Abdominal pain onset 30 minutes ago; vomited x1.  RLQ and Epigastric pain with rebound tenderness.  ESRD HD (MWF); COPD on oxygen at home 3 liters. Negative COVID19 Test on June 8.

## 2020-06-29 NOTE — ED ADULT TRIAGE NOTE - STATUS:
Spiritual Plan of Care    Pt Name: Rock ALEXANDRA Rausch  Pt : 1957  Date: 2018      responded to consult. RN suggested  meet wife and daughter who were in waiting room.  Both were receptive and coping.   offered reassurance and noted   support available  prn.   plans to follow up with pt later today.     Referral source: Consult    Reason for visit: Emotional Support    Visited with: Family/Friend    Patient Assessment: Unable to assess    Family/Friend Assessment: Coping    Family/Friend  Intervention: Affirmation,  Support    Spiritual Plan of Care: Follow up if requested     Applied

## 2020-06-29 NOTE — ED PROVIDER NOTE - PATIENT PORTAL LINK FT
You can access the FollowMyHealth Patient Portal offered by [facility] by registering at the following website: [email or web address]. By joining [employer]’s FollowMyHealth portal, you will also be able to view your health information using other applications (apps) compatible with our system.

## 2020-06-29 NOTE — ED ADULT NURSE NOTE - NSIMPLEMENTINTERV_GEN_ALL_ED
Implemented All Universal Safety Interventions:  [place] to call system. Call bell, personal items and telephone within reach. Instruct patient to call for assistance. Room bathroom lighting operational. Non-slip footwear when patient is off stretcher. Physically safe environment: no spills, clutter or unnecessary equipment. Stretcher in lowest position, wheels locked, appropriate side rails in place.

## 2020-06-29 NOTE — ED PROVIDER NOTE - PMH
Anxiety    Asthma    Asthma with COPD    Bipolar disorder    Bipolar disorder    COPD (chronic obstructive pulmonary disease)    Diabetes mellitus, type 2    DM (diabetes mellitus)    ESRD on dialysis    HTN (hypertension)    Hypertension    Migraine    Morbid obesity with BMI of 40.0-44.9, adult    Renal failure    Schizophrenia    Schizophrenia

## 2020-06-29 NOTE — ED PROVIDER NOTE - NSFOLLOWUPINSTRUCTIONS_ED_ALL_ED_FT
Return to ER immediately if your abdominal pain does not improve, worsens, or persists, if you have fever, vomiting,  blood in stools or you have black stools, unable to eat or drink, have worsening/persistent diarrhea or constipation, any concerns. See your doctor as soon as possible (within 1-2 days).   If you need further assistance for appointments you can contact the [place] Care Coordinator at [phone]. In addition our outpatient [facility] is located at [address] [place] [address], tele: [phone].

## 2020-06-29 NOTE — ED PROVIDER NOTE - OBJECTIVE STATEMENT
Chief complaint of RLQ tenderness starting 30min prior to arrival associated with nausea and vomiting. denies current shortness of breath, no chest pain, no fever.  Pt is ESRD HD (MWF) [facility] (Dr. Sebastian).

## 2020-06-29 NOTE — ED PROVIDER NOTE - CARE PROVIDER_API CALL
Isac La  INTERNAL MEDICINE  [address] [place], [place] [address]  Phone: [phone]  Fax: [phone]  Follow Up Time:

## 2020-06-29 NOTE — ED ADULT TRIAGE NOTE - CHIEF COMPLAINT QUOTE
Abdominal pain onset 30 minutes ago; vomited x1.  RLQ and Epigastric pain with rebound tenderness.  ESRD HD (MWF); COPD on oxygen at home 3 liters. Abdominal pain onset 30 minutes ago; vomited x1.  RLQ and Epigastric pain with rebound tenderness.  ESRD HD (MWF); COPD on oxygen at home 3 liters. Negative COVID19 Test on June 8.

## 2020-06-29 NOTE — ED PROVIDER NOTE - CLINICAL SUMMARY MEDICAL DECISION MAKING FREE TEXT BOX
CT abd reproted normal appendix and normal pancrease. Kt is wnl. Pt is well appearing, no guarding to repeat abdominal palpation, able to eat and drink without vomiting. Pt is due for HD today. Pt is well appearing, has no new complaints and able to walk with normal gait. Pt is stable for discharge and follow up with medical doctor. Pt educated on care and need for follow up. Discussed anticipatory guidance and return precautions. Questions answered. I had a detailed discussion with the patient and/or guardian regarding the historical points, exam findings, and any diagnostic results supporting the discharge diagnosis.

## 2020-07-01 NOTE — ED ADULT NURSE NOTE - INTEGUMENTARY WDL
Called back the preferred number listed on encounter, that was patient's number.  He insisted we call Martita directly at her number listed in his chart.  Called Martita, left detailed vm for her to call back to determine if she wants the Rx faxed or picked up   Color consistent with ethnicity/race, warm, dry intact, resilient.

## 2020-07-13 NOTE — ED ADULT NURSE NOTE - NSIMPLEMENTINTERV_GEN_ALL_ED
Physical Therapy    Facility/Department: [address] [place] [address] GENERAL SURGERY  Initial Assessment, Treatment and DC    NAME: Apollo Booker  : 1955  MRN: [id number]    Date of Service: 2020    Discharge Recommendations:    Apollo Booker scored a 20/24 on the AM-PAC short mobility form. Current research shows that an AM-PAC score of 18 or greater is typically associated with a discharge to the patient's home setting. Based on the patient's AM-PAC score and their current functional mobility deficits, it is recommended that the patient have 2-3 sessions per week of Physical Therapy at d/c to increase the patient's independence. At this time, this patient demonstrates the endurance and safety to discharge home with  (home vs OP services) and a follow up treatment frequency of 2-3x/wk. Please see assessment section for further patient specific details. PT Equipment Recommendations  Equipment Needed: Yes(RW given to pt.)    Assessment   Assessment: Pt doing well POD 0,  Plan is for the patient to return home this PM after therapies are complete. Treatment Diagnosis: Decreased functional mobility post right THR anterior approach  Prognosis: Excellent  Decision Making: Low Complexity  PT Education: Plan of Care;Precautions;PT Role;General Safety;Gait Training;Transfer Training  Patient Education: Pt educated on anterior precautions and given written handout. Barriers to Learning: none noted  REQUIRES PT FOLLOW UP: No  Activity Tolerance  Activity Tolerance: Patient Tolerated treatment well       Patient Diagnosis(es): The encounter diagnosis was Status post total hip replacement, right.     has a past medical history of Arthritis, Hearing impaired person, Hyperlipidemia, and Hypertension. has a past surgical history that includes Cholecystectomy; Appendectomy; and Ankle surgery (Right, 7/1/15).     Restrictions  Position Activity Restriction  Other position/activity restrictions: anterior hip precautions, FWBAT  Vision/Hearing  Vision: Within Functional Limits  Hearing: Exceptions to [facility]  Hearing Exceptions: Hard of hearing/hearing concerns;Bilateral hearing aid     Subjective  General  Chart Reviewed: Yes  Additional Pertinent Hx: HTN, HLD, OA, [place]  Referring Practitioner: Harry Royal  Diagnosis: Right THR anterior approach  Subjective  Subjective: Pt supine in PACU upon PT entry, agreeable to working with PT  Pain Screening  Patient Currently in Pain: Denies  Vital Signs  Patient Currently in Pain: Denies       Orientation  Orientation  Overall Orientation Status: Within Functional Limits  Social/Functional History  Social/Functional History  Lives With: Spouse  Type of Home: House  Home Layout: Two level, 1/2 bath on main level, Bed/Bath upstairs  Home Access: Stairs to enter without rails  Entrance Stairs - Number of Steps: 1  Bathroom Shower/Tub: Tub/Shower unit  Bathroom Toilet: Standard(sink close for leverage)  Bathroom Equipment: Hand-held shower  Home Equipment: Cane, Rolling walker  ADL Assistance: Independent  Homemaking Assistance: Independent  Ambulation Assistance: Independent  Transfer Assistance: Independent  Active : Yes  Occupation: Full time employment  Type of occupation: property agent    Objective  AROM RLE (degrees)  RLE AROM: WFL  RLE General AROM: NT, but moving functionally supine to sit and back  AROM LLE (degrees)  LLE AROM : WFL  Strength RLE  Comment: good quad set, glut set,  ankle wfl  Strength LLE  Strength LLE: WFL     Sensation  Overall Sensation Status: WFL  Bed mobility  Supine to Sit: Stand by assistance(vc for precautions)  Sit to Supine: Stand by assistance(vc for precautions.)  Transfers  Sit to Stand: Contact guard assistance  Stand to sit: Stand by assistance  Ambulation  Ambulation?: Yes  Ambulation 1  Device: Rolling Walker  Assistance: Stand by assistance  Quality of Gait: slow and steady gait, good balance. no complaint of pain.      Balance  Comments: good stability Implemented All Fall Risk Interventions:  [place] to call system. Call bell, personal items and telephone within reach. Instruct patient to call for assistance. Room bathroom lighting operational. Non-slip footwear when patient is off stretcher. Physically safe environment: no spills, clutter or unnecessary equipment. Stretcher in lowest position, wheels locked, appropriate side rails in place. Provide visual cue, wrist band, yellow gown, etc. Monitor gait and stability. Monitor for mental status changes and reorient to person, place, and time. Review medications for side effects contributing to fall risk. Reinforce activity limits and safety measures with patient and family.

## 2020-07-20 ENCOUNTER — EMERGENCY (EMERGENCY)
Facility: HOSPITAL | Age: 40
LOS: 1 days | Discharge: HOME | End: 2020-07-20
Attending: STUDENT IN AN ORGANIZED HEALTH CARE EDUCATION/TRAINING PROGRAM
Payer: MEDICARE

## 2020-07-20 VITALS
RESPIRATION RATE: 20 BRPM | HEART RATE: 74 BPM | DIASTOLIC BLOOD PRESSURE: 97 MMHG | SYSTOLIC BLOOD PRESSURE: 161 MMHG | TEMPERATURE: 98 F | OXYGEN SATURATION: 99 %

## 2020-07-20 VITALS
DIASTOLIC BLOOD PRESSURE: 85 MMHG | TEMPERATURE: 99 F | HEIGHT: 78 IN | SYSTOLIC BLOOD PRESSURE: 135 MMHG | OXYGEN SATURATION: 99 % | WEIGHT: 315 LBS | RESPIRATION RATE: 20 BRPM | HEART RATE: 80 BPM

## 2020-07-20 DIAGNOSIS — I77.0 ARTERIOVENOUS FISTULA, ACQUIRED: Chronic | ICD-10-CM

## 2020-07-20 DIAGNOSIS — Z98.890 OTHER SPECIFIED POSTPROCEDURAL STATES: Chronic | ICD-10-CM

## 2020-07-20 LAB
ALBUMIN SERPL ELPH-MCNC: 3.3 G/DL — LOW (ref 3.5–5)
ALP SERPL-CCNC: 175 U/L — HIGH (ref 40–120)
ALT FLD-CCNC: 21 U/L DA — SIGNIFICANT CHANGE UP (ref 10–60)
ANION GAP SERPL CALC-SCNC: 8 MMOL/L — SIGNIFICANT CHANGE UP (ref 5–17)
APTT BLD: 34 SEC — SIGNIFICANT CHANGE UP (ref 27.5–35.5)
AST SERPL-CCNC: 19 U/L — SIGNIFICANT CHANGE UP (ref 10–40)
BASOPHILS # BLD AUTO: 0.03 K/UL — SIGNIFICANT CHANGE UP (ref 0–0.2)
BASOPHILS NFR BLD AUTO: 0.3 % — SIGNIFICANT CHANGE UP (ref 0–2)
BILIRUB SERPL-MCNC: 0.3 MG/DL — SIGNIFICANT CHANGE UP (ref 0.2–1.2)
BUN SERPL-MCNC: 36 MG/DL — HIGH (ref 7–18)
CALCIUM SERPL-MCNC: 8.6 MG/DL — SIGNIFICANT CHANGE UP (ref 8.4–10.5)
CHLORIDE SERPL-SCNC: 98 MMOL/L — SIGNIFICANT CHANGE UP (ref 96–108)
CO2 SERPL-SCNC: 28 MMOL/L — SIGNIFICANT CHANGE UP (ref 22–31)
CREAT SERPL-MCNC: 5.71 MG/DL — HIGH (ref 0.5–1.3)
EOSINOPHIL # BLD AUTO: 0.28 K/UL — SIGNIFICANT CHANGE UP (ref 0–0.5)
EOSINOPHIL NFR BLD AUTO: 3 % — SIGNIFICANT CHANGE UP (ref 0–6)
GLUCOSE SERPL-MCNC: 110 MG/DL — HIGH (ref 70–99)
HCT VFR BLD CALC: 33.9 % — LOW (ref 39–50)
HGB BLD-MCNC: 11.1 G/DL — LOW (ref 13–17)
IMM GRANULOCYTES NFR BLD AUTO: 0.5 % — SIGNIFICANT CHANGE UP (ref 0–1.5)
INR BLD: 1.04 RATIO — SIGNIFICANT CHANGE UP (ref 0.88–1.16)
LYMPHOCYTES # BLD AUTO: 0.65 K/UL — LOW (ref 1–3.3)
LYMPHOCYTES # BLD AUTO: 7.1 % — LOW (ref 13–44)
MCHC RBC-ENTMCNC: 32.2 PG — SIGNIFICANT CHANGE UP (ref 27–34)
MCHC RBC-ENTMCNC: 32.7 GM/DL — SIGNIFICANT CHANGE UP (ref 32–36)
MCV RBC AUTO: 98.3 FL — SIGNIFICANT CHANGE UP (ref 80–100)
MONOCYTES # BLD AUTO: 0.79 K/UL — SIGNIFICANT CHANGE UP (ref 0–0.9)
MONOCYTES NFR BLD AUTO: 8.6 % — SIGNIFICANT CHANGE UP (ref 2–14)
NEUTROPHILS # BLD AUTO: 7.41 K/UL — HIGH (ref 1.8–7.4)
NEUTROPHILS NFR BLD AUTO: 80.5 % — HIGH (ref 43–77)
NRBC # BLD: 0 /100 WBCS — SIGNIFICANT CHANGE UP (ref 0–0)
PLATELET # BLD AUTO: 239 K/UL — SIGNIFICANT CHANGE UP (ref 150–400)
POTASSIUM SERPL-MCNC: 4 MMOL/L — SIGNIFICANT CHANGE UP (ref 3.5–5.3)
POTASSIUM SERPL-SCNC: 4 MMOL/L — SIGNIFICANT CHANGE UP (ref 3.5–5.3)
PROT SERPL-MCNC: 7.9 G/DL — SIGNIFICANT CHANGE UP (ref 6–8.3)
PROTHROM AB SERPL-ACNC: 12.1 SEC — SIGNIFICANT CHANGE UP (ref 10.6–13.6)
RBC # BLD: 3.45 M/UL — LOW (ref 4.2–5.8)
RBC # FLD: 15.4 % — HIGH (ref 10.3–14.5)
SODIUM SERPL-SCNC: 134 MMOL/L — LOW (ref 135–145)
TROPONIN I SERPL-MCNC: 0.02 NG/ML — SIGNIFICANT CHANGE UP (ref 0–0.04)
TROPONIN I SERPL-MCNC: <0.015 NG/ML — SIGNIFICANT CHANGE UP (ref 0–0.04)
WBC # BLD: 9.21 K/UL — SIGNIFICANT CHANGE UP (ref 3.8–10.5)
WBC # FLD AUTO: 9.21 K/UL — SIGNIFICANT CHANGE UP (ref 3.8–10.5)

## 2020-07-20 PROCEDURE — 71045 X-RAY EXAM CHEST 1 VIEW: CPT

## 2020-07-20 PROCEDURE — 84484 ASSAY OF TROPONIN QUANT: CPT

## 2020-07-20 PROCEDURE — 99284 EMERGENCY DEPT VISIT MOD MDM: CPT | Mod: 25

## 2020-07-20 PROCEDURE — 85730 THROMBOPLASTIN TIME PARTIAL: CPT

## 2020-07-20 PROCEDURE — 99285 EMERGENCY DEPT VISIT HI MDM: CPT

## 2020-07-20 PROCEDURE — 36415 COLL VENOUS BLD VENIPUNCTURE: CPT

## 2020-07-20 PROCEDURE — 93005 ELECTROCARDIOGRAM TRACING: CPT

## 2020-07-20 PROCEDURE — 96374 THER/PROPH/DIAG INJ IV PUSH: CPT

## 2020-07-20 PROCEDURE — 85027 COMPLETE CBC AUTOMATED: CPT

## 2020-07-20 PROCEDURE — 71045 X-RAY EXAM CHEST 1 VIEW: CPT | Mod: 26

## 2020-07-20 PROCEDURE — 80053 COMPREHEN METABOLIC PANEL: CPT

## 2020-07-20 PROCEDURE — 85610 PROTHROMBIN TIME: CPT

## 2020-07-20 RX ORDER — ONDANSETRON 8 MG/1
4 TABLET, FILM COATED ORAL ONCE
Refills: 0 | Status: COMPLETED | OUTPATIENT
Start: 2020-07-20 | End: 2020-07-20

## 2020-07-20 RX ORDER — SUCRALFATE 1 G
1 TABLET ORAL ONCE
Refills: 0 | Status: COMPLETED | OUTPATIENT
Start: 2020-07-20 | End: 2020-07-20

## 2020-07-20 RX ORDER — OXYCODONE AND ACETAMINOPHEN 5; 325 MG/1; MG/1
1 TABLET ORAL ONCE
Refills: 0 | Status: DISCONTINUED | OUTPATIENT
Start: 2020-07-20 | End: 2020-07-20

## 2020-07-20 RX ADMIN — ONDANSETRON 4 MILLIGRAM(S): 8 TABLET, FILM COATED ORAL at 12:12

## 2020-07-20 RX ADMIN — Medication 1 GRAM(S): at 13:13

## 2020-07-20 RX ADMIN — OXYCODONE AND ACETAMINOPHEN 1 TABLET(S): 5; 325 TABLET ORAL at 20:06

## 2020-07-20 RX ADMIN — Medication 30 MILLILITER(S): at 13:13

## 2020-07-20 NOTE — ED ADULT NURSE NOTE - OBJECTIVE STATEMENT
chest pain today during dialysis session, states he had around 4 hrs of dialysis but was not completed.

## 2020-07-20 NOTE — ED PROVIDER NOTE - PATIENT PORTAL LINK FT
You can access the FollowMyHealth Patient Portal offered by [facility] by registering at the following website: [email or web address]. By joining [employer]’s FollowMyHealth portal, you will also be able to view your health information using other applications (apps) compatible with our system.

## 2020-07-20 NOTE — ED PROVIDER NOTE - OBJECTIVE STATEMENT
39 y/o M with a significant PMHx of anxiety, asthma with COPD, bipolar, DM, HTN, migraine, morbid obesity, schizophrenia, and ESRD, on dialysis, presents to the ED for hypotension. Patient was at dialysis center and was noted to be hypotensive with systolic in the 80s. Patient also reports chest pain started during dialysis. Patient completed 4 out of 5 hours of dialysis. Denies nausea, vomiting, fever, chills, shortness of breath, or any other acute complaints. Patient endorses he felt fine yesterday.

## 2020-07-20 NOTE — ED PROVIDER NOTE - CLINICAL SUMMARY MEDICAL DECISION MAKING FREE TEXT BOX
Patient presenting with chest pain. hypotension in setting of dialysis, now bp stable. will monitor. will obtain lab, assess acs. cxr, assess ptx, pna. assess lyte, ed obs and reassess

## 2020-07-20 NOTE — ED ADULT NURSE NOTE - NSIMPLEMENTINTERV_GEN_ALL_ED
Implemented All Universal Safety Interventions:  [place] to call system. Call bell, personal items and telephone within reach. Instruct patient to call for assistance. Room bathroom lighting operational. Non-slip footwear when patient is off stretcher. Physically safe environment: no spills, clutter or unnecessary equipment. Stretcher in lowest position, wheels locked, appropriate side rails in place.

## 2020-07-20 NOTE — ED ADULT TRIAGE NOTE - CHIEF COMPLAINT QUOTE
Pt came from dialysis, did not finished dialysis, c/o right sided chest pain, given tylenol 650mg po and on Oxygen 3L at home

## 2020-07-20 NOTE — ED PROVIDER NOTE - PROGRESS NOTE DETAILS
patient clinically stable. tolerating po, ate lunch. trop negative. cxr show b/l infiltrate but improved from prior cxr, saturation stable. 2nd trop ordered for 5pm [place]:  Pt received in signout, pending second troponin.  Second trop still WNL.  Pt saying he still has chest pain and wants Percocet, however opiate does not seem appropriate for Pt's symptoms.  He was resting and sleeping comfortably for few hours after eating a meal.  Will arrange ambulance for transportation to home.  Advised strict return precautions and PMD f/u.

## 2020-07-31 ENCOUNTER — APPOINTMENT (OUTPATIENT)
Dept: VASCULAR SURGERY | Facility: CLINIC | Age: 40
End: 2020-07-31

## 2020-07-31 ENCOUNTER — EMERGENCY (EMERGENCY)
Facility: HOSPITAL | Age: 40
LOS: 1 days | Discharge: HOME | End: 2020-07-31
Attending: EMERGENCY MEDICINE
Payer: MEDICARE

## 2020-07-31 VITALS
HEIGHT: 78 IN | WEIGHT: 315 LBS | RESPIRATION RATE: 16 BRPM | HEART RATE: 83 BPM | DIASTOLIC BLOOD PRESSURE: 92 MMHG | SYSTOLIC BLOOD PRESSURE: 144 MMHG | TEMPERATURE: 98 F | OXYGEN SATURATION: 98 %

## 2020-07-31 VITALS
TEMPERATURE: 98 F | RESPIRATION RATE: 18 BRPM | OXYGEN SATURATION: 100 % | DIASTOLIC BLOOD PRESSURE: 88 MMHG | HEART RATE: 78 BPM | SYSTOLIC BLOOD PRESSURE: 146 MMHG

## 2020-07-31 DIAGNOSIS — Z98.890 OTHER SPECIFIED POSTPROCEDURAL STATES: Chronic | ICD-10-CM

## 2020-07-31 DIAGNOSIS — I77.0 ARTERIOVENOUS FISTULA, ACQUIRED: Chronic | ICD-10-CM

## 2020-07-31 LAB
ALBUMIN SERPL ELPH-MCNC: 3.4 G/DL — LOW (ref 3.5–5)
ALP SERPL-CCNC: 165 U/L — HIGH (ref 40–120)
ALT FLD-CCNC: 24 U/L DA — SIGNIFICANT CHANGE UP (ref 10–60)
ANION GAP SERPL CALC-SCNC: 4 MMOL/L — LOW (ref 5–17)
ANISOCYTOSIS BLD QL: SLIGHT — SIGNIFICANT CHANGE UP
AST SERPL-CCNC: 27 U/L — SIGNIFICANT CHANGE UP (ref 10–40)
BASOPHILS # BLD AUTO: 0.04 K/UL — SIGNIFICANT CHANGE UP (ref 0–0.2)
BASOPHILS NFR BLD AUTO: 0.5 % — SIGNIFICANT CHANGE UP (ref 0–2)
BILIRUB SERPL-MCNC: 0.4 MG/DL — SIGNIFICANT CHANGE UP (ref 0.2–1.2)
BLD GP AB SCN SERPL QL: SIGNIFICANT CHANGE UP
BUN SERPL-MCNC: 21 MG/DL — HIGH (ref 7–18)
CALCIUM SERPL-MCNC: 9.3 MG/DL — SIGNIFICANT CHANGE UP (ref 8.4–10.5)
CHLORIDE SERPL-SCNC: 101 MMOL/L — SIGNIFICANT CHANGE UP (ref 96–108)
CO2 SERPL-SCNC: 31 MMOL/L — SIGNIFICANT CHANGE UP (ref 22–31)
CREAT SERPL-MCNC: 4.98 MG/DL — HIGH (ref 0.5–1.3)
EOSINOPHIL # BLD AUTO: 0.24 K/UL — SIGNIFICANT CHANGE UP (ref 0–0.5)
EOSINOPHIL NFR BLD AUTO: 3.1 % — SIGNIFICANT CHANGE UP (ref 0–6)
GLUCOSE SERPL-MCNC: 110 MG/DL — HIGH (ref 70–99)
HCT VFR BLD CALC: 37.3 % — LOW (ref 39–50)
HGB BLD-MCNC: 12.1 G/DL — LOW (ref 13–17)
IMM GRANULOCYTES NFR BLD AUTO: 0.6 % — SIGNIFICANT CHANGE UP (ref 0–1.5)
LIDOCAIN IGE QN: 211 U/L — SIGNIFICANT CHANGE UP (ref 73–393)
LYMPHOCYTES # BLD AUTO: 0.92 K/UL — LOW (ref 1–3.3)
LYMPHOCYTES # BLD AUTO: 11.8 % — LOW (ref 13–44)
MAGNESIUM SERPL-MCNC: 2.2 MG/DL — SIGNIFICANT CHANGE UP (ref 1.6–2.6)
MANUAL SMEAR VERIFICATION: SIGNIFICANT CHANGE UP
MCHC RBC-ENTMCNC: 31.9 PG — SIGNIFICANT CHANGE UP (ref 27–34)
MCHC RBC-ENTMCNC: 32.4 GM/DL — SIGNIFICANT CHANGE UP (ref 32–36)
MCV RBC AUTO: 98.4 FL — SIGNIFICANT CHANGE UP (ref 80–100)
MONOCYTES # BLD AUTO: 0.84 K/UL — SIGNIFICANT CHANGE UP (ref 0–0.9)
MONOCYTES NFR BLD AUTO: 10.8 % — SIGNIFICANT CHANGE UP (ref 2–14)
NEUTROPHILS # BLD AUTO: 5.72 K/UL — SIGNIFICANT CHANGE UP (ref 1.8–7.4)
NEUTROPHILS NFR BLD AUTO: 73.2 % — SIGNIFICANT CHANGE UP (ref 43–77)
NRBC # BLD: 0 /100 WBCS — SIGNIFICANT CHANGE UP (ref 0–0)
PLAT MORPH BLD: NORMAL — SIGNIFICANT CHANGE UP
PLATELET # BLD AUTO: 238 K/UL — SIGNIFICANT CHANGE UP (ref 150–400)
PLATELET COUNT - ESTIMATE: NORMAL — SIGNIFICANT CHANGE UP
POLYCHROMASIA BLD QL SMEAR: SLIGHT — SIGNIFICANT CHANGE UP
POTASSIUM SERPL-MCNC: 4.1 MMOL/L — SIGNIFICANT CHANGE UP (ref 3.5–5.3)
POTASSIUM SERPL-SCNC: 4.1 MMOL/L — SIGNIFICANT CHANGE UP (ref 3.5–5.3)
PROT SERPL-MCNC: 8 G/DL — SIGNIFICANT CHANGE UP (ref 6–8.3)
RBC # BLD: 3.79 M/UL — LOW (ref 4.2–5.8)
RBC # FLD: 14.8 % — HIGH (ref 10.3–14.5)
RBC BLD AUTO: ABNORMAL
SODIUM SERPL-SCNC: 136 MMOL/L — SIGNIFICANT CHANGE UP (ref 135–145)
TROPONIN I SERPL-MCNC: <0.015 NG/ML — SIGNIFICANT CHANGE UP (ref 0–0.04)
WBC # BLD: 7.81 K/UL — SIGNIFICANT CHANGE UP (ref 3.8–10.5)
WBC # FLD AUTO: 7.81 K/UL — SIGNIFICANT CHANGE UP (ref 3.8–10.5)

## 2020-07-31 PROCEDURE — 71046 X-RAY EXAM CHEST 2 VIEWS: CPT

## 2020-07-31 PROCEDURE — 83690 ASSAY OF LIPASE: CPT

## 2020-07-31 PROCEDURE — 86900 BLOOD TYPING SEROLOGIC ABO: CPT

## 2020-07-31 PROCEDURE — 71046 X-RAY EXAM CHEST 2 VIEWS: CPT | Mod: 26

## 2020-07-31 PROCEDURE — 84484 ASSAY OF TROPONIN QUANT: CPT

## 2020-07-31 PROCEDURE — 86850 RBC ANTIBODY SCREEN: CPT

## 2020-07-31 PROCEDURE — 83735 ASSAY OF MAGNESIUM: CPT

## 2020-07-31 PROCEDURE — 86901 BLOOD TYPING SEROLOGIC RH(D): CPT

## 2020-07-31 PROCEDURE — 80053 COMPREHEN METABOLIC PANEL: CPT

## 2020-07-31 PROCEDURE — 93010 ELECTROCARDIOGRAM REPORT: CPT

## 2020-07-31 PROCEDURE — 99283 EMERGENCY DEPT VISIT LOW MDM: CPT | Mod: 25

## 2020-07-31 PROCEDURE — 99284 EMERGENCY DEPT VISIT MOD MDM: CPT | Mod: 25

## 2020-07-31 PROCEDURE — 85027 COMPLETE CBC AUTOMATED: CPT

## 2020-07-31 PROCEDURE — 36415 COLL VENOUS BLD VENIPUNCTURE: CPT

## 2020-07-31 PROCEDURE — 93005 ELECTROCARDIOGRAM TRACING: CPT

## 2020-07-31 RX ORDER — OXYCODONE AND ACETAMINOPHEN 5; 325 MG/1; MG/1
2 TABLET ORAL ONCE
Refills: 0 | Status: DISCONTINUED | OUTPATIENT
Start: 2020-07-31 | End: 2020-07-31

## 2020-07-31 RX ADMIN — OXYCODONE AND ACETAMINOPHEN 2 TABLET(S): 5; 325 TABLET ORAL at 16:20

## 2020-07-31 NOTE — ED PROVIDER NOTE - CLINICAL SUMMARY MEDICAL DECISION MAKING FREE TEXT BOX
negative troponin with symptoms greater than 4 hours. not likely cardiac in etiology. patient appropriate for discharge home. strict precautions when to return to the ED given and understood.

## 2020-07-31 NOTE — DISCHARGE NOTE PROVIDER - INSTRUCTIONS
Follow a consistent carbohydrate diet with plenty of fresh non-starchy vegetables (Ex: lettuce, broccoli, carrots, cucumbers, celery, kale) and lean protein. Avoid sugar sweetened beverages, whole milk and other dairy products such as yogurt, baked goods/sweets, and candy. Starchy foods such as white bread, white rice, cereal, potatoes, corn, and pasta should also be avoided. The preferred dietary options include whole grain bread, brown rice, whole grain pasta, and foods rich in fiber such as leafy green vegetables, apples, berries, and certain beans.   Many fruits also may cause your blood sugar to increase quickly. It is recommended to limit your fruit servings to 2-3 per day. Limit or avoid fruits high in sugar including grapes, cherries, tropical fruits (peter, pineapple, papaya), melon, banana, fruit juice, and dried fruit. Fruit such as apples, pears, berries, kiwi, grapefruit, and orange are preferred.
Former smoker

## 2020-07-31 NOTE — ED ADULT NURSE NOTE - OBJECTIVE STATEMENT
pt is here for chest pain.  pt stated that chest pain radiate to right side of back, denied palpitation or sob, denied fever or sob, dialysis on M/W/F, no distress noted at this time.

## 2020-07-31 NOTE — ED PROVIDER NOTE - PATIENT PORTAL LINK FT
You can access the FollowMyHealth Patient Portal offered by [facility] by registering at the following website: [email or web address]. By joining [employer]’s FollowMyHealth portal, you will also be able to view your health information using other applications (apps) compatible with our system.

## 2020-07-31 NOTE — ED ADULT TRIAGE NOTE - CHIEF COMPLAINT QUOTE
c/o chest pain radiating to  r side since 9 am today , dialysis  done today. one s/l nitroglycerin given by EMT

## 2020-07-31 NOTE — ED PROVIDER NOTE - MUSCULOSKELETAL, MLM
Spine appears normal, range of motion is not limited, no muscle or joint tenderness. AV fistula present to the left upper extremity.

## 2020-07-31 NOTE — ED PROVIDER NOTE - OBJECTIVE STATEMENT
40 year old male with PMHx of anxiety, asthma, bipolar disorder, COPD, diabetes mellitus, ESRD on dialysis, hypertension, migraines, morbid obesity, renal failure, and schizophrenia and PSHx of AVF placement and hernia repair presents to the ED with complaints of intermittent substernal chest pain since 09:00 today. Patient describes the pain as a sharp stabbing sensation with radiation to his right arm. Patient denies any associated shortness of breath. Patient is noted to have been seen in this ED approximately ten days ago for similar complaints. In the ED, patient states that his chest pain is resolving. Patient reports that he completed a full course of dialysis earlier today.   Allergies: Penicillin (Rash), Aspirin (swelling)

## 2020-08-21 NOTE — DISCHARGE NOTE PROVIDER - NSDCHOSPICE_GEN_A_CORE
No Tazorac Counseling:  Patient advised that medication is irritating and drying.  Patient may need to apply sparingly and wash off after an hour before eventually leaving it on overnight.  The patient verbalized understanding of the proper use and possible adverse effects of tazorac.  All of the patient's questions and concerns were addressed.

## 2020-08-22 ENCOUNTER — APPOINTMENT (OUTPATIENT)
Dept: DISASTER EMERGENCY | Facility: CLINIC | Age: 40
End: 2020-08-22

## 2020-08-23 LAB — SARS-COV-2 N GENE NPH QL NAA+PROBE: NOT DETECTED

## 2020-08-24 ENCOUNTER — FORM ENCOUNTER (OUTPATIENT)
Age: 40
End: 2020-08-24

## 2020-08-25 ENCOUNTER — APPOINTMENT (OUTPATIENT)
Dept: ENDOVASCULAR SURGERY | Facility: CLINIC | Age: 40
End: 2020-08-25
Payer: MEDICARE

## 2020-08-25 VITALS
DIASTOLIC BLOOD PRESSURE: 88 MMHG | RESPIRATION RATE: 15 BRPM | SYSTOLIC BLOOD PRESSURE: 154 MMHG | TEMPERATURE: 98.1 F | HEART RATE: 69 BPM | WEIGHT: 315 LBS | HEIGHT: 78 IN | BODY MASS INDEX: 36.45 KG/M2 | OXYGEN SATURATION: 100 %

## 2020-08-25 PROCEDURE — 99213 OFFICE O/P EST LOW 20 MIN: CPT | Mod: 25

## 2020-08-25 PROCEDURE — 36902Z: CUSTOM

## 2020-08-27 ENCOUNTER — EMERGENCY (EMERGENCY)
Facility: HOSPITAL | Age: 40
LOS: 1 days | Discharge: HOME | End: 2020-08-27
Attending: EMERGENCY MEDICINE
Payer: MEDICARE

## 2020-08-27 VITALS
SYSTOLIC BLOOD PRESSURE: 143 MMHG | OXYGEN SATURATION: 98 % | RESPIRATION RATE: 18 BRPM | WEIGHT: 315 LBS | TEMPERATURE: 98 F | HEART RATE: 78 BPM | DIASTOLIC BLOOD PRESSURE: 96 MMHG

## 2020-08-27 DIAGNOSIS — Z98.890 OTHER SPECIFIED POSTPROCEDURAL STATES: Chronic | ICD-10-CM

## 2020-08-27 DIAGNOSIS — I77.0 ARTERIOVENOUS FISTULA, ACQUIRED: Chronic | ICD-10-CM

## 2020-08-27 PROCEDURE — 73630 X-RAY EXAM OF FOOT: CPT

## 2020-08-27 PROCEDURE — 72100 X-RAY EXAM L-S SPINE 2/3 VWS: CPT | Mod: 26

## 2020-08-27 PROCEDURE — 73610 X-RAY EXAM OF ANKLE: CPT | Mod: 26,LT

## 2020-08-27 PROCEDURE — 99285 EMERGENCY DEPT VISIT HI MDM: CPT

## 2020-08-27 PROCEDURE — 73610 X-RAY EXAM OF ANKLE: CPT

## 2020-08-27 PROCEDURE — 73630 X-RAY EXAM OF FOOT: CPT | Mod: 26,LT

## 2020-08-27 PROCEDURE — 99284 EMERGENCY DEPT VISIT MOD MDM: CPT | Mod: 25

## 2020-08-27 PROCEDURE — 72100 X-RAY EXAM L-S SPINE 2/3 VWS: CPT

## 2020-08-27 RX ORDER — OXYCODONE AND ACETAMINOPHEN 5; 325 MG/1; MG/1
2 TABLET ORAL ONCE
Refills: 0 | Status: DISCONTINUED | OUTPATIENT
Start: 2020-08-27 | End: 2020-08-27

## 2020-08-27 RX ORDER — IBUPROFEN 200 MG
600 TABLET ORAL ONCE
Refills: 0 | Status: COMPLETED | OUTPATIENT
Start: 2020-08-27 | End: 2020-08-27

## 2020-08-27 RX ADMIN — OXYCODONE AND ACETAMINOPHEN 2 TABLET(S): 5; 325 TABLET ORAL at 15:27

## 2020-08-27 RX ADMIN — Medication 600 MILLIGRAM(S): at 15:27

## 2020-08-27 NOTE — ED ADULT NURSE NOTE - NSIMPLEMENTINTERV_GEN_ALL_ED
Implemented All Fall with Harm Risk Interventions:  [place] to call system. Call bell, personal items and telephone within reach. Instruct patient to call for assistance. Room bathroom lighting operational. Non-slip footwear when patient is off stretcher. Physically safe environment: no spills, clutter or unnecessary equipment. Stretcher in lowest position, wheels locked, appropriate side rails in place. Provide visual cue, wrist band, yellow gown, etc. Monitor gait and stability. Monitor for mental status changes and reorient to person, place, and time. Review medications for side effects contributing to fall risk. Reinforce activity limits and safety measures with patient and family. Provide visual clues: red socks.

## 2020-08-27 NOTE — ED PROVIDER NOTE - MUSCULOSKELETAL MINIMAL EXAM
left ankle larger than right ankle, diffuse tenderness to malleolus, no erythema, no increase warmth. Diffuse tenderness to the malleolus, no erythema, no increase warmth. Diffuse tenderness to the L5 and S1, no abscess or induration, neurovascularly intact

## 2020-08-27 NOTE — ED PROVIDER NOTE - CLINICAL SUMMARY MEDICAL DECISION MAKING FREE TEXT BOX
Ankle and back pain with other issue. Unlikely fractures, no suspicion court complaints, GOUT, septic, arthritis. Will get pain medications. Patient has a pediatry follow up.

## 2020-08-27 NOTE — ED PROVIDER NOTE - NSFOLLOWUPINSTRUCTIONS_ED_ALL_ED_FT
Take 600mg Ibuprofen (Advil/Motrin) every 8 hrs for the next 5-7 days.  Follow up with your doctor/Podiatrist or in the Clinic as discussed within 2 days.  Return to the ER for any concerns.

## 2020-08-27 NOTE — ED PROVIDER NOTE - PATIENT PORTAL LINK FT
You can access the FollowMyHealth Patient Portal offered by [facility] by registering at the following website: [email or web address]. By joining [employer]’s FollowMyHealth portal, you will also be able to view your health information using other applications (apps) compatible with our system.

## 2020-08-27 NOTE — ED PROVIDER NOTE - CARE PLAN
Principal Discharge DX:	Acute left ankle pain  Secondary Diagnosis:	Acute midline low back pain without sciatica

## 2020-08-27 NOTE — ED PROVIDER NOTE - OBJECTIVE STATEMENT
39 y/o M patient with a significant PMHx of complex care note in place, ESRD, DM, Psych issues, COPD on home air and no significant PSHx presents to the ED with c/o back pain, left ankle pain, unable to ambulate on it. Patient denies any fever, chills or any other complains. Patient states having back pain for 4d that is non-radiating to the legs. Patient reports having last dialysis yesterday. Patient denies any chest pain, dizziness, incontinence or any other complains.

## 2020-08-31 NOTE — PROCEDURE
[Resume diet] : resume diet [Other: _____] : [unfilled] [Site check for bleeding/hematoma/thrill/bruit] : Site check for bleeding/hematoma/thrill/bruit [Vital signs on admission the q 15 mins x2] : Vital signs on admission the q 15 mins x2 [FreeTextEntry1] : left arm fistula fistulogram/angioplasty

## 2020-08-31 NOTE — HISTORY OF PRESENT ILLNESS
[] : left brachiocephalic fistula [FreeTextEntry4] : yesterday  [FreeTextEntry1] : 2016  in [place] [FreeTextEntry5] : yesterday  [FreeTextEntry6] : Dr. Jimenez

## 2020-08-31 NOTE — REASON FOR VISIT
[Other ___] : a [unfilled] visit for [High Venous Pressure] : high venous pressure [Swollen Extremity] : swollen extremity [FreeTextEntry2] : pain in arm

## 2020-10-05 NOTE — ED ADULT TRIAGE NOTE - AS TEMP SITE
Mother of child states pt was exposed to someone that tested positive for covid 19 6 days ago. Since that time 3 members of the family have developed symptoms. Pt developed low grade fever and runny nose sore throat and cough. Fever is intermittent and low grade. Mother denies signs of difficulty breathing, dehydration, pain, N/v/D or any other symptoms. Writer advised home cares. Mother states patient has a pending test.Writer advised to quarantine. Mother to return call if pt does test positive, with any new, worsening or persistent symptoms. Mother verbalizes understanding and has no further questions at this time.    Reason for Disposition  • [1] Cough occurs AND [2] within 14 days of novel CORONAVIRUS exposure to confirmed case or PUI    Protocols used: CORONAVIRUS (2019-NCOV) EXPOSURE-P-AH      
oral

## 2020-10-20 ENCOUNTER — APPOINTMENT (OUTPATIENT)
Dept: ENDOVASCULAR SURGERY | Facility: CLINIC | Age: 40
End: 2020-10-20
Payer: MEDICARE

## 2020-10-22 ENCOUNTER — APPOINTMENT (OUTPATIENT)
Dept: DISASTER EMERGENCY | Facility: CLINIC | Age: 40
End: 2020-10-22

## 2020-10-23 LAB — SARS-COV-2 N GENE NPH QL NAA+PROBE: NOT DETECTED

## 2020-10-26 ENCOUNTER — FORM ENCOUNTER (OUTPATIENT)
Age: 40
End: 2020-10-26

## 2020-10-27 ENCOUNTER — APPOINTMENT (OUTPATIENT)
Dept: ENDOVASCULAR SURGERY | Facility: CLINIC | Age: 40
End: 2020-10-27
Payer: MEDICARE

## 2020-10-27 VITALS
DIASTOLIC BLOOD PRESSURE: 93 MMHG | TEMPERATURE: 97.7 F | HEART RATE: 71 BPM | OXYGEN SATURATION: 98 % | HEIGHT: 78 IN | SYSTOLIC BLOOD PRESSURE: 159 MMHG | BODY MASS INDEX: 36.45 KG/M2 | RESPIRATION RATE: 20 BRPM | WEIGHT: 315 LBS

## 2020-10-27 PROCEDURE — 36907Z: CUSTOM | Mod: 59

## 2020-10-27 PROCEDURE — 99203 OFFICE O/P NEW LOW 30 MIN: CPT | Mod: 25

## 2020-10-27 PROCEDURE — 36903Z: CUSTOM

## 2020-10-27 PROCEDURE — 99214 OFFICE O/P EST MOD 30 MIN: CPT | Mod: 25

## 2020-10-30 NOTE — ED PROVIDER NOTE - AGGRAVATING FACTORS
none Imiquimod Counseling:  I discussed with the patient the risks of imiquimod including but not limited to erythema, scaling, itching, weeping, crusting, and pain.  Patient understands that the inflammatory response to imiquimod is variable from person to person and was educated regarded proper titration schedule.  If flu-like symptoms develop, patient knows to discontinue the medication and contact us.

## 2020-10-30 NOTE — PAST MEDICAL HISTORY
[FreeTextEntry1] : Malignant Hyperthermia Screening Tool and Risk of Bleeding Assessment\par Mr. PRESTON JOHNSON denies family history of unexpected death following Anesthesia or Exercise.\par Denies Family history of Malignant Hyperthermia, Muscle or Neuromuscular disorder and High Temperature following exercise.\par \par Mr. PRESTON JOHNSON denies history of Muscle Spasm, Dark or Chocolate - Colored urine and Unanticipated fever immediately following anesthesia or serious exercise. \par Mr. JOHNSON also denies bleeding tendencies/ Risks of Bleeding.\par

## 2020-10-30 NOTE — HISTORY OF PRESENT ILLNESS
[FreeTextEntry1] : 2016  in [place] [FreeTextEntry4] : yesterday  [FreeTextEntry5] : yesterday at 6pm [FreeTextEntry6] : Dr. Jimenez

## 2020-11-06 ENCOUNTER — EMERGENCY (EMERGENCY)
Facility: HOSPITAL | Age: 40
LOS: 1 days | Discharge: HOME | End: 2020-11-06
Attending: EMERGENCY MEDICINE
Payer: MEDICARE

## 2020-11-06 VITALS
RESPIRATION RATE: 16 BRPM | HEIGHT: 78 IN | DIASTOLIC BLOOD PRESSURE: 97 MMHG | WEIGHT: 315 LBS | HEART RATE: 81 BPM | SYSTOLIC BLOOD PRESSURE: 155 MMHG | TEMPERATURE: 99 F | OXYGEN SATURATION: 99 %

## 2020-11-06 DIAGNOSIS — Z98.890 OTHER SPECIFIED POSTPROCEDURAL STATES: Chronic | ICD-10-CM

## 2020-11-06 DIAGNOSIS — I77.0 ARTERIOVENOUS FISTULA, ACQUIRED: Chronic | ICD-10-CM

## 2020-11-06 LAB
ALBUMIN SERPL ELPH-MCNC: 3.1 G/DL — LOW (ref 3.5–5)
ALP SERPL-CCNC: 181 U/L — HIGH (ref 40–120)
ALT FLD-CCNC: 27 U/L DA — SIGNIFICANT CHANGE UP (ref 10–60)
ANION GAP SERPL CALC-SCNC: 6 MMOL/L — SIGNIFICANT CHANGE UP (ref 5–17)
AST SERPL-CCNC: 17 U/L — SIGNIFICANT CHANGE UP (ref 10–40)
BASOPHILS # BLD AUTO: 0.03 K/UL — SIGNIFICANT CHANGE UP (ref 0–0.2)
BASOPHILS NFR BLD AUTO: 0.4 % — SIGNIFICANT CHANGE UP (ref 0–2)
BILIRUB SERPL-MCNC: 0.3 MG/DL — SIGNIFICANT CHANGE UP (ref 0.2–1.2)
BUN SERPL-MCNC: 18 MG/DL — SIGNIFICANT CHANGE UP (ref 7–18)
CALCIUM SERPL-MCNC: 10.3 MG/DL — SIGNIFICANT CHANGE UP (ref 8.4–10.5)
CHLORIDE SERPL-SCNC: 95 MMOL/L — LOW (ref 96–108)
CO2 SERPL-SCNC: 36 MMOL/L — HIGH (ref 22–31)
CREAT SERPL-MCNC: 5.29 MG/DL — HIGH (ref 0.5–1.3)
EOSINOPHIL # BLD AUTO: 0.34 K/UL — SIGNIFICANT CHANGE UP (ref 0–0.5)
EOSINOPHIL NFR BLD AUTO: 4.4 % — SIGNIFICANT CHANGE UP (ref 0–6)
GLUCOSE SERPL-MCNC: 99 MG/DL — SIGNIFICANT CHANGE UP (ref 70–99)
HCT VFR BLD CALC: 33.4 % — LOW (ref 39–50)
HGB BLD-MCNC: 10.5 G/DL — LOW (ref 13–17)
IMM GRANULOCYTES NFR BLD AUTO: 0.3 % — SIGNIFICANT CHANGE UP (ref 0–1.5)
LYMPHOCYTES # BLD AUTO: 1.03 K/UL — SIGNIFICANT CHANGE UP (ref 1–3.3)
LYMPHOCYTES # BLD AUTO: 13.4 % — SIGNIFICANT CHANGE UP (ref 13–44)
MAGNESIUM SERPL-MCNC: 2.2 MG/DL — SIGNIFICANT CHANGE UP (ref 1.6–2.6)
MCHC RBC-ENTMCNC: 29.7 PG — SIGNIFICANT CHANGE UP (ref 27–34)
MCHC RBC-ENTMCNC: 31.4 GM/DL — LOW (ref 32–36)
MCV RBC AUTO: 94.6 FL — SIGNIFICANT CHANGE UP (ref 80–100)
MONOCYTES # BLD AUTO: 1.11 K/UL — HIGH (ref 0–0.9)
MONOCYTES NFR BLD AUTO: 14.4 % — HIGH (ref 2–14)
NEUTROPHILS # BLD AUTO: 5.16 K/UL — SIGNIFICANT CHANGE UP (ref 1.8–7.4)
NEUTROPHILS NFR BLD AUTO: 67.1 % — SIGNIFICANT CHANGE UP (ref 43–77)
NRBC # BLD: 0 /100 WBCS — SIGNIFICANT CHANGE UP (ref 0–0)
PHOSPHATE SERPL-MCNC: 3.2 MG/DL — SIGNIFICANT CHANGE UP (ref 2.5–4.5)
PLATELET # BLD AUTO: 186 K/UL — SIGNIFICANT CHANGE UP (ref 150–400)
POTASSIUM SERPL-MCNC: 4.1 MMOL/L — SIGNIFICANT CHANGE UP (ref 3.5–5.3)
POTASSIUM SERPL-SCNC: 4.1 MMOL/L — SIGNIFICANT CHANGE UP (ref 3.5–5.3)
PROT SERPL-MCNC: 7.6 G/DL — SIGNIFICANT CHANGE UP (ref 6–8.3)
RBC # BLD: 3.53 M/UL — LOW (ref 4.2–5.8)
RBC # FLD: 13.2 % — SIGNIFICANT CHANGE UP (ref 10.3–14.5)
SODIUM SERPL-SCNC: 137 MMOL/L — SIGNIFICANT CHANGE UP (ref 135–145)
TROPONIN I SERPL-MCNC: 0.04 NG/ML — SIGNIFICANT CHANGE UP (ref 0–0.04)
WBC # BLD: 7.69 K/UL — SIGNIFICANT CHANGE UP (ref 3.8–10.5)
WBC # FLD AUTO: 7.69 K/UL — SIGNIFICANT CHANGE UP (ref 3.8–10.5)

## 2020-11-06 PROCEDURE — 99285 EMERGENCY DEPT VISIT HI MDM: CPT

## 2020-11-06 PROCEDURE — 93010 ELECTROCARDIOGRAM REPORT: CPT

## 2020-11-06 RX ORDER — MECLIZINE HCL 12.5 MG
25 TABLET ORAL ONCE
Refills: 0 | Status: COMPLETED | OUTPATIENT
Start: 2020-11-06 | End: 2020-11-06

## 2020-11-06 RX ADMIN — Medication 25 MILLIGRAM(S): at 22:12

## 2020-11-07 VITALS
OXYGEN SATURATION: 99 % | SYSTOLIC BLOOD PRESSURE: 150 MMHG | DIASTOLIC BLOOD PRESSURE: 87 MMHG | HEART RATE: 97 BPM | RESPIRATION RATE: 18 BRPM

## 2020-11-07 PROCEDURE — 83735 ASSAY OF MAGNESIUM: CPT

## 2020-11-07 PROCEDURE — 80053 COMPREHEN METABOLIC PANEL: CPT

## 2020-11-07 PROCEDURE — 85025 COMPLETE CBC W/AUTO DIFF WBC: CPT

## 2020-11-07 PROCEDURE — 36415 COLL VENOUS BLD VENIPUNCTURE: CPT

## 2020-11-07 PROCEDURE — 99284 EMERGENCY DEPT VISIT MOD MDM: CPT | Mod: 25

## 2020-11-07 PROCEDURE — 84100 ASSAY OF PHOSPHORUS: CPT

## 2020-11-07 PROCEDURE — 84484 ASSAY OF TROPONIN QUANT: CPT

## 2020-11-07 PROCEDURE — 70450 CT HEAD/BRAIN W/O DYE: CPT

## 2020-11-07 PROCEDURE — 93005 ELECTROCARDIOGRAM TRACING: CPT

## 2020-11-07 PROCEDURE — 96374 THER/PROPH/DIAG INJ IV PUSH: CPT

## 2020-11-07 PROCEDURE — 70450 CT HEAD/BRAIN W/O DYE: CPT | Mod: 26

## 2020-11-07 RX ORDER — METOCLOPRAMIDE HCL 10 MG
10 TABLET ORAL ONCE
Refills: 0 | Status: COMPLETED | OUTPATIENT
Start: 2020-11-07 | End: 2020-11-07

## 2020-11-07 RX ORDER — MECLIZINE HCL 12.5 MG
25 TABLET ORAL ONCE
Refills: 0 | Status: COMPLETED | OUTPATIENT
Start: 2020-11-07 | End: 2020-11-07

## 2020-11-07 RX ORDER — MECLIZINE HCL 12.5 MG
1 TABLET ORAL
Qty: 15 | Refills: 0
Start: 2020-11-07

## 2020-11-07 RX ADMIN — Medication 25 MILLIGRAM(S): at 01:44

## 2020-11-07 RX ADMIN — Medication 1 MILLIGRAM(S): at 03:13

## 2020-11-07 NOTE — ED ADULT NURSE REASSESSMENT NOTE - NS ED NURSE REASSESS COMMENT FT1
patient resting comfortable in bed after meclizine, states dizziness subsided. Will continue to monitor.

## 2020-11-07 NOTE — ED PROVIDER NOTE - PROGRESS NOTE DETAILS
patient states the room is spinning constantly and no medications help however he has no physical signs and symptoms of vertigo- does not move his body reactively when moving his head nor has nystagmus.  He states meclizine not reglan not ativan have helped his vertigo which is suspicious.  CT head without stroke.  d/w Dr Reyna who states patient often wants to be admitted.  Dr CHOE will assess patient at bedside.

## 2020-11-07 NOTE — ED PROVIDER NOTE - OBJECTIVE STATEMENT
41 y/o M with a significant PMHx of COPD, DM, HTN, ESRD, on dialysis, schizophrenia, bipolar, presents to the ED with intermittent dizziness x 1 week and occasional vertigo like symptoms. Denies syncope, chest pain, shortness of breath, fever, focal weakness or any other acute complaints. Normal dialysis session today.

## 2020-11-07 NOTE — ED PROVIDER NOTE - PATIENT PORTAL LINK FT
You can access the FollowMyHealth Patient Portal offered by [facility] by registering at the following website: [email or web address]. By joining [facility]’s FollowMyHealth portal, you will also be able to view your health information using other applications (apps) compatible with our system. You can access the FollowMyHealth Patient Portal offered by [facility] by registering at the following website: [email or web address]. By joining [facility]’s FollowMyHealth portal, you will also be able to view your health information using other applications (apps) compatible with our system.

## 2020-11-07 NOTE — ED PROVIDER NOTE - CLINICAL SUMMARY MEDICAL DECISION MAKING FREE TEXT BOX
39 y/o M presents with dizziness and vertigo. Will check CT head, labs, give meclizine and reassess.

## 2020-11-11 NOTE — PATIENT PROFILE ADULT - FALLEN IN THE PAST
[FreeTextEntry1] : In summary the patient is a 75-year-old male with past medical history significant for asthma, chronic obstructive pulmonary disease, hypertension, anxiety, gastroesophageal reflux disorder presents for followup and continuation of his Xolair  therapy.\par \par Patient's physical exam was significant for bilaterally.wheezing  The patient was treated with Xolair 300 mg intramuscular.\par Patient instructed to continue current medications and followup in one month\par 
no

## 2020-11-25 NOTE — PATIENT PROFILE ADULT - FUNCTIONAL SCREEN CURRENT LEVEL: DRESSING, MLM
2 = assistive person Metronidazole Pregnancy And Lactation Text: This medication is Pregnancy Category B and considered safe during pregnancy.  It is also excreted in breast milk.

## 2020-12-05 ENCOUNTER — APPOINTMENT (OUTPATIENT)
Dept: DISASTER EMERGENCY | Facility: CLINIC | Age: 40
End: 2020-12-05

## 2020-12-06 LAB — SARS-COV-2 N GENE NPH QL NAA+PROBE: NOT DETECTED

## 2020-12-08 ENCOUNTER — RESULT REVIEW (OUTPATIENT)
Age: 40
End: 2020-12-08

## 2020-12-08 ENCOUNTER — APPOINTMENT (OUTPATIENT)
Dept: ENDOVASCULAR SURGERY | Facility: CLINIC | Age: 40
End: 2020-12-08
Payer: MEDICARE

## 2020-12-08 VITALS
SYSTOLIC BLOOD PRESSURE: 188 MMHG | RESPIRATION RATE: 20 BRPM | BODY MASS INDEX: 36.45 KG/M2 | HEIGHT: 78 IN | WEIGHT: 315 LBS | HEART RATE: 84 BPM | DIASTOLIC BLOOD PRESSURE: 104 MMHG | TEMPERATURE: 98.3 F

## 2020-12-08 PROCEDURE — 36902Z: CUSTOM

## 2020-12-08 PROCEDURE — 36907Z: CUSTOM | Mod: 59

## 2020-12-08 NOTE — REASON FOR VISIT
[Other ___] : a [unfilled] visit for [Prolonged Bleeding] : prolonged bleeding [Swollen Extremity] : swollen extremity [Aneurysm] : aneurysm

## 2020-12-15 ENCOUNTER — INPATIENT (INPATIENT)
Facility: HOSPITAL | Age: 40
LOS: 3 days | Discharge: HOME | DRG: 91 | End: 2020-12-19
Attending: HOSPITALIST | Admitting: HOSPITALIST
Payer: MEDICARE

## 2020-12-15 VITALS
OXYGEN SATURATION: 99 % | SYSTOLIC BLOOD PRESSURE: 176 MMHG | HEART RATE: 74 BPM | HEIGHT: 78 IN | RESPIRATION RATE: 16 BRPM | DIASTOLIC BLOOD PRESSURE: 93 MMHG | TEMPERATURE: 98 F

## 2020-12-15 DIAGNOSIS — Z98.890 OTHER SPECIFIED POSTPROCEDURAL STATES: Chronic | ICD-10-CM

## 2020-12-15 LAB
ACETONE SERPL-MCNC: NEGATIVE — SIGNIFICANT CHANGE UP
ALBUMIN SERPL ELPH-MCNC: 3.1 G/DL — LOW (ref 3.5–5)
ALP SERPL-CCNC: 172 U/L — HIGH (ref 40–120)
ALT FLD-CCNC: 16 U/L DA — SIGNIFICANT CHANGE UP (ref 10–60)
ANION GAP SERPL CALC-SCNC: 12 MMOL/L — SIGNIFICANT CHANGE UP (ref 5–17)
AST SERPL-CCNC: 20 U/L — SIGNIFICANT CHANGE UP (ref 10–40)
BASOPHILS # BLD AUTO: 0.04 K/UL — SIGNIFICANT CHANGE UP (ref 0–0.2)
BASOPHILS NFR BLD AUTO: 0.5 % — SIGNIFICANT CHANGE UP (ref 0–2)
BILIRUB SERPL-MCNC: 0.4 MG/DL — SIGNIFICANT CHANGE UP (ref 0.2–1.2)
BUN SERPL-MCNC: 71 MG/DL — HIGH (ref 7–18)
CALCIUM SERPL-MCNC: 10.5 MG/DL — SIGNIFICANT CHANGE UP (ref 8.4–10.5)
CHLORIDE SERPL-SCNC: 99 MMOL/L — SIGNIFICANT CHANGE UP (ref 96–108)
CO2 SERPL-SCNC: 23 MMOL/L — SIGNIFICANT CHANGE UP (ref 22–31)
CREAT SERPL-MCNC: 8.36 MG/DL — HIGH (ref 0.5–1.3)
EOSINOPHIL # BLD AUTO: 0.38 K/UL — SIGNIFICANT CHANGE UP (ref 0–0.5)
EOSINOPHIL NFR BLD AUTO: 5.1 % — SIGNIFICANT CHANGE UP (ref 0–6)
GLUCOSE SERPL-MCNC: 103 MG/DL — HIGH (ref 70–99)
HCT VFR BLD CALC: 35.4 % — LOW (ref 39–50)
HGB BLD-MCNC: 10.8 G/DL — LOW (ref 13–17)
IMM GRANULOCYTES NFR BLD AUTO: 0.4 % — SIGNIFICANT CHANGE UP (ref 0–1.5)
LIDOCAIN IGE QN: 144 U/L — SIGNIFICANT CHANGE UP (ref 73–393)
LYMPHOCYTES # BLD AUTO: 0.98 K/UL — LOW (ref 1–3.3)
LYMPHOCYTES # BLD AUTO: 13.2 % — SIGNIFICANT CHANGE UP (ref 13–44)
MAGNESIUM SERPL-MCNC: 2.3 MG/DL — SIGNIFICANT CHANGE UP (ref 1.6–2.6)
MCHC RBC-ENTMCNC: 29.3 PG — SIGNIFICANT CHANGE UP (ref 27–34)
MCHC RBC-ENTMCNC: 30.5 GM/DL — LOW (ref 32–36)
MCV RBC AUTO: 95.9 FL — SIGNIFICANT CHANGE UP (ref 80–100)
MONOCYTES # BLD AUTO: 0.97 K/UL — HIGH (ref 0–0.9)
MONOCYTES NFR BLD AUTO: 13.1 % — SIGNIFICANT CHANGE UP (ref 2–14)
NEUTROPHILS # BLD AUTO: 5 K/UL — SIGNIFICANT CHANGE UP (ref 1.8–7.4)
NEUTROPHILS NFR BLD AUTO: 67.7 % — SIGNIFICANT CHANGE UP (ref 43–77)
NRBC # BLD: 0 /100 WBCS — SIGNIFICANT CHANGE UP (ref 0–0)
NT-PROBNP SERPL-SCNC: HIGH PG/ML (ref 0–125)
PLATELET # BLD AUTO: 219 K/UL — SIGNIFICANT CHANGE UP (ref 150–400)
POTASSIUM SERPL-MCNC: 4.6 MMOL/L — SIGNIFICANT CHANGE UP (ref 3.5–5.3)
POTASSIUM SERPL-SCNC: 4.6 MMOL/L — SIGNIFICANT CHANGE UP (ref 3.5–5.3)
PROT SERPL-MCNC: 8.1 G/DL — SIGNIFICANT CHANGE UP (ref 6–8.3)
RBC # BLD: 3.69 M/UL — LOW (ref 4.2–5.8)
RBC # FLD: 14.6 % — HIGH (ref 10.3–14.5)
SODIUM SERPL-SCNC: 134 MMOL/L — LOW (ref 135–145)
TROPONIN I SERPL-MCNC: 0.11 NG/ML — HIGH (ref 0–0.04)
WBC # BLD: 7.4 K/UL — SIGNIFICANT CHANGE UP (ref 3.8–10.5)
WBC # FLD AUTO: 7.4 K/UL — SIGNIFICANT CHANGE UP (ref 3.8–10.5)

## 2020-12-15 PROCEDURE — 71045 X-RAY EXAM CHEST 1 VIEW: CPT | Mod: 26

## 2020-12-15 PROCEDURE — 70450 CT HEAD/BRAIN W/O DYE: CPT | Mod: 26

## 2020-12-15 NOTE — ED PROVIDER NOTE - ATTENDING CONTRIBUTION TO CARE
40 y.o. male with diet controlled DM, ESRD, last HD yest., BIBA pt c/o shaking this afternoon, pt then got up & walked around, pt with LOC, pt woke up & found himself outside the kitchen.  Pt hit back of head, no lac. Pt was able to get up by himself.  Pt went to the bathroom & went to his bedroom & rested.  Pt then again went the kitchen, after having juice, pt with LOC, found himself @ the door.  Denies urinary incontinence, tongue biting.  Pt had similar episode few mos ago in [place] never had himself evaluated.  Pt sustained Lt knee pain after 2nd fall.  NO CP, dizziness, pt with chronic sob,  PE: 40 y.o. male with diet controlled DM, ESRD, last HD yest., BIBA pt c/o shaking this afternoon, pt then got up & walked around, pt with LOC, pt woke up & found himself outside the kitchen.  Pt hit back of head, no lac. Pt was able to get up by himself.  Pt went to the bathroom & went to his bedroom & rested.  Pt then again went the kitchen, after having juice, pt with LOC, found himself @ the door.  Denies urinary incontinence, tongue biting.  Pt had similar episode few mos ago in [place]. never had himself evaluated.  Pt sustained Lt knee pain after 2nd fall.  NO CP, dizziness, pt with chronic sob,  PE: in mild distress, obese, heart-S1S2RR, lung-clear, abd- soft, obese, NT, Ext-LUE-AVG with 2 large bubble, sl tenderness to palp., no discoloration, positive thrills.  Lt knee- no effusion, sl tenderness med/lat aspect FROM 40 y.o. male with diet controlled DM, ESRD, last HD yest., COPD on Home O2 24x7.  BIBA pt c/o shaking this afternoon, pt then got up & walked around, pt with LOC, pt woke up & found himself outside the kitchen.  Pt hit back of head, no lac. Pt was able to get up by himself.  Pt went to the bathroom & went to his bedroom & rested.  Pt then again went the kitchen, after having juice, pt with LOC, found himself @ the door.  Denies urinary incontinence, tongue biting.  Pt had similar episode few mos ago in [place] never had himself evaluated.  Pt sustained Lt knee pain after 2nd fall.  NO CP, dizziness, pt with chronic sob,  PE: in mild distress, obese, heart-S1S2RR, lung-clear, abd- soft, obese, NT, Ext-LUE-AVG with 2 large bubble, sl tenderness to palp., no discoloration, positive thrills.  Lt knee- no effusion, sl tenderness med/lat aspect FROM  Pt with syncopex2, will get labs, CT head, EKG

## 2020-12-15 NOTE — ED PROVIDER NOTE - OBJECTIVE STATEMENT
39 y/o M, extensive PMHx including T2DM, HTN, ESRD (MWF), CHF, COPD on home O2 (3L), bipolar disorder, schizophrenia, BIBA presenting s/p 2 unwitnessed syncopal episodes at home. Pt reports that around 3 pm today, he developed lightheadedness while getting a drink and woke up on the ground. His estimated down time was about 30 minutes. The patient then reports that he went to his room, walked back into the kitchen to get a drink, relaxed in his room, and then had another syncopal episode when he went to put the cup in the sink. The patient reports remembering his legs giving out from under him and then falling backwards. The patient estimates down time to be about 45 minutes. The patient reports he has been having dizziness. The patient endorses left knee pain, but denies any back pain. The patient reports posterior headache where he thinks he may have hit his head, but denies visual changes, numbness, tingling, weakness. He also reports having a sensation of "wanting to walk" and reports having tremors for the past two weeks. The patient is on quetiapine and risperidone. The patient reports shortness of breath, but states that this is chronic given his COPD. The patient denies fever, chills, chest pain, palpitations, abdominal pain, nausea, vomiting, changes in bowel movement, changes in urination.

## 2020-12-15 NOTE — ED PROVIDER NOTE - MUSCULOSKELETAL MINIMAL EXAM
diffuse left knee tenderness without obvious deformity, swelling, ecchymosis, or crepitus, FAROM, NVI distally

## 2020-12-15 NOTE — ED ADULT NURSE NOTE - NSIMPLEMENTINTERV_GEN_ALL_ED
Implemented All Fall Risk Interventions:  [place] to call system. Call bell, personal items and telephone within reach. Instruct patient to call for assistance. Room bathroom lighting operational. Non-slip footwear when patient is off stretcher. Physically safe environment: no spills, clutter or unnecessary equipment. Stretcher in lowest position, wheels locked, appropriate side rails in place. Provide visual cue, wrist band, yellow gown, etc. Monitor gait and stability. Monitor for mental status changes and reorient to person, place, and time. Review medications for side effects contributing to fall risk. Reinforce activity limits and safety measures with patient and family.

## 2020-12-15 NOTE — ED PROVIDER NOTE - CRANIAL NERVE AND PUPILLARY EXAM
resting tremor to BUE, normal finger to nose/cranial nerves 2-12 intact/extra-ocular movements intact/tongue is midline

## 2020-12-15 NOTE — ED ADULT TRIAGE NOTE - NS ED NURSE BANDS TYPE
Pt is calling to ask if she has worn her heart monitor long enough to get information that is needed.  She is going to take it off this afternoon to see a chiropractor for her knee and she would like to just leave it off.     Please call on home number.   Name band;

## 2020-12-16 DIAGNOSIS — I10 ESSENTIAL (PRIMARY) HYPERTENSION: ICD-10-CM

## 2020-12-16 DIAGNOSIS — R55 SYNCOPE AND COLLAPSE: ICD-10-CM

## 2020-12-16 DIAGNOSIS — E11.9 TYPE 2 DIABETES MELLITUS WITHOUT COMPLICATIONS: ICD-10-CM

## 2020-12-16 DIAGNOSIS — J44.9 CHRONIC OBSTRUCTIVE PULMONARY DISEASE, UNSPECIFIED: ICD-10-CM

## 2020-12-16 DIAGNOSIS — F31.9 BIPOLAR DISORDER, UNSPECIFIED: ICD-10-CM

## 2020-12-16 DIAGNOSIS — Z29.9 ENCOUNTER FOR PROPHYLACTIC MEASURES, UNSPECIFIED: ICD-10-CM

## 2020-12-16 DIAGNOSIS — N18.6 END STAGE RENAL DISEASE: ICD-10-CM

## 2020-12-16 DIAGNOSIS — F20.9 SCHIZOPHRENIA, UNSPECIFIED: ICD-10-CM

## 2020-12-16 DIAGNOSIS — E66.01 MORBID (SEVERE) OBESITY DUE TO EXCESS CALORIES: ICD-10-CM

## 2020-12-16 LAB
A1C WITH ESTIMATED AVERAGE GLUCOSE RESULT: 4.6 % — SIGNIFICANT CHANGE UP (ref 4–5.6)
ALBUMIN SERPL ELPH-MCNC: 3.2 G/DL — LOW (ref 3.5–5)
ALP SERPL-CCNC: 162 U/L — HIGH (ref 40–120)
ALT FLD-CCNC: 15 U/L DA — SIGNIFICANT CHANGE UP (ref 10–60)
ANION GAP SERPL CALC-SCNC: 12 MMOL/L — SIGNIFICANT CHANGE UP (ref 5–17)
AST SERPL-CCNC: 9 U/L — LOW (ref 10–40)
BASOPHILS # BLD AUTO: 0.04 K/UL — SIGNIFICANT CHANGE UP (ref 0–0.2)
BASOPHILS NFR BLD AUTO: 0.5 % — SIGNIFICANT CHANGE UP (ref 0–2)
BILIRUB SERPL-MCNC: 0.4 MG/DL — SIGNIFICANT CHANGE UP (ref 0.2–1.2)
BUN SERPL-MCNC: 78 MG/DL — HIGH (ref 7–18)
CALCIUM SERPL-MCNC: 10.9 MG/DL — HIGH (ref 8.4–10.5)
CHLORIDE SERPL-SCNC: 96 MMOL/L — SIGNIFICANT CHANGE UP (ref 96–108)
CHOLEST SERPL-MCNC: 141 MG/DL — SIGNIFICANT CHANGE UP
CO2 SERPL-SCNC: 26 MMOL/L — SIGNIFICANT CHANGE UP (ref 22–31)
CREAT SERPL-MCNC: 9.25 MG/DL — HIGH (ref 0.5–1.3)
EOSINOPHIL # BLD AUTO: 0.41 K/UL — SIGNIFICANT CHANGE UP (ref 0–0.5)
EOSINOPHIL NFR BLD AUTO: 4.8 % — SIGNIFICANT CHANGE UP (ref 0–6)
ESTIMATED AVERAGE GLUCOSE: 85 MG/DL — SIGNIFICANT CHANGE UP (ref 68–114)
FERRITIN SERPL-MCNC: 1067 NG/ML — HIGH (ref 30–400)
FOLATE SERPL-MCNC: 7.8 NG/ML — SIGNIFICANT CHANGE UP
GLUCOSE BLDC GLUCOMTR-MCNC: 110 MG/DL — HIGH (ref 70–99)
GLUCOSE SERPL-MCNC: 111 MG/DL — HIGH (ref 70–99)
HBV SURFACE AB SER-ACNC: REACTIVE
HBV SURFACE AG SER-ACNC: SIGNIFICANT CHANGE UP
HCT VFR BLD CALC: 34.2 % — LOW (ref 39–50)
HDLC SERPL-MCNC: 63 MG/DL — SIGNIFICANT CHANGE UP
HGB BLD-MCNC: 10.6 G/DL — LOW (ref 13–17)
IMM GRANULOCYTES NFR BLD AUTO: 0.5 % — SIGNIFICANT CHANGE UP (ref 0–1.5)
LDH SERPL L TO P-CCNC: 161 U/L — SIGNIFICANT CHANGE UP (ref 120–225)
LIPID PNL WITH DIRECT LDL SERPL: 67 MG/DL — SIGNIFICANT CHANGE UP
LYMPHOCYTES # BLD AUTO: 1.28 K/UL — SIGNIFICANT CHANGE UP (ref 1–3.3)
LYMPHOCYTES # BLD AUTO: 15 % — SIGNIFICANT CHANGE UP (ref 13–44)
MAGNESIUM SERPL-MCNC: 2.3 MG/DL — SIGNIFICANT CHANGE UP (ref 1.6–2.6)
MCHC RBC-ENTMCNC: 29.8 PG — SIGNIFICANT CHANGE UP (ref 27–34)
MCHC RBC-ENTMCNC: 31 GM/DL — LOW (ref 32–36)
MCV RBC AUTO: 96.1 FL — SIGNIFICANT CHANGE UP (ref 80–100)
MONOCYTES # BLD AUTO: 1.1 K/UL — HIGH (ref 0–0.9)
MONOCYTES NFR BLD AUTO: 12.9 % — SIGNIFICANT CHANGE UP (ref 2–14)
NEUTROPHILS # BLD AUTO: 5.67 K/UL — SIGNIFICANT CHANGE UP (ref 1.8–7.4)
NEUTROPHILS NFR BLD AUTO: 66.3 % — SIGNIFICANT CHANGE UP (ref 43–77)
NON HDL CHOLESTEROL: 78 MG/DL — SIGNIFICANT CHANGE UP
NRBC # BLD: 0 /100 WBCS — SIGNIFICANT CHANGE UP (ref 0–0)
NT-PROBNP SERPL-SCNC: HIGH PG/ML (ref 0–125)
PHOSPHATE SERPL-MCNC: 8.1 MG/DL — HIGH (ref 2.5–4.5)
PLATELET # BLD AUTO: 213 K/UL — SIGNIFICANT CHANGE UP (ref 150–400)
POTASSIUM SERPL-MCNC: 5.2 MMOL/L — SIGNIFICANT CHANGE UP (ref 3.5–5.3)
POTASSIUM SERPL-SCNC: 5.2 MMOL/L — SIGNIFICANT CHANGE UP (ref 3.5–5.3)
PROT SERPL-MCNC: 8 G/DL — SIGNIFICANT CHANGE UP (ref 6–8.3)
RBC # BLD: 3.56 M/UL — LOW (ref 4.2–5.8)
RBC # BLD: 3.56 M/UL — LOW (ref 4.2–5.8)
RBC # FLD: 14.5 % — SIGNIFICANT CHANGE UP (ref 10.3–14.5)
RETICS #: 90.7 K/UL — SIGNIFICANT CHANGE UP (ref 25–125)
RETICS/RBC NFR: 2.6 % — HIGH (ref 0.5–2.5)
SARS-COV-2 RNA SPEC QL NAA+PROBE: SIGNIFICANT CHANGE UP
SODIUM SERPL-SCNC: 134 MMOL/L — LOW (ref 135–145)
TRIGL SERPL-MCNC: 56 MG/DL — SIGNIFICANT CHANGE UP
TROPONIN I SERPL-MCNC: 0.08 NG/ML — HIGH (ref 0–0.04)
TROPONIN I SERPL-MCNC: 0.1 NG/ML — HIGH (ref 0–0.04)
TSH SERPL-MCNC: 4.44 UU/ML — SIGNIFICANT CHANGE UP (ref 0.34–4.82)
VIT B12 SERPL-MCNC: 941 PG/ML — SIGNIFICANT CHANGE UP (ref 232–1245)
WBC # BLD: 8.54 K/UL — SIGNIFICANT CHANGE UP (ref 3.8–10.5)
WBC # FLD AUTO: 8.54 K/UL — SIGNIFICANT CHANGE UP (ref 3.8–10.5)

## 2020-12-16 PROCEDURE — 99223 1ST HOSP IP/OBS HIGH 75: CPT

## 2020-12-16 PROCEDURE — 93880 EXTRACRANIAL BILAT STUDY: CPT | Mod: 26

## 2020-12-16 PROCEDURE — 99285 EMERGENCY DEPT VISIT HI MDM: CPT

## 2020-12-16 PROCEDURE — 73562 X-RAY EXAM OF KNEE 3: CPT | Mod: 26,LT

## 2020-12-16 RX ORDER — INSULIN LISPRO 100/ML
5 VIAL (ML) SUBCUTANEOUS
Qty: 0 | Refills: 0 | DISCHARGE

## 2020-12-16 RX ORDER — INSULIN GLARGINE 100 [IU]/ML
12 INJECTION, SOLUTION SUBCUTANEOUS
Qty: 0 | Refills: 0 | DISCHARGE

## 2020-12-16 RX ORDER — BUDESONIDE AND FORMOTEROL FUMARATE DIHYDRATE 160; 4.5 UG/1; UG/1
2 AEROSOL RESPIRATORY (INHALATION)
Refills: 0 | Status: DISCONTINUED | OUTPATIENT
Start: 2020-12-16 | End: 2020-12-19

## 2020-12-16 RX ORDER — QUETIAPINE FUMARATE 200 MG/1
50 TABLET, FILM COATED ORAL AT BEDTIME
Refills: 0 | Status: DISCONTINUED | OUTPATIENT
Start: 2020-12-16 | End: 2020-12-19

## 2020-12-16 RX ORDER — BUDESONIDE AND FORMOTEROL FUMARATE DIHYDRATE 160; 4.5 UG/1; UG/1
2 AEROSOL RESPIRATORY (INHALATION)
Qty: 0 | Refills: 0 | DISCHARGE

## 2020-12-16 RX ORDER — MIRTAZAPINE 45 MG/1
15 TABLET, ORALLY DISINTEGRATING ORAL AT BEDTIME
Refills: 0 | Status: DISCONTINUED | OUTPATIENT
Start: 2020-12-16 | End: 2020-12-19

## 2020-12-16 RX ORDER — ALBUTEROL 90 UG/1
2 AEROSOL, METERED ORAL EVERY 6 HOURS
Refills: 0 | Status: DISCONTINUED | OUTPATIENT
Start: 2020-12-16 | End: 2020-12-19

## 2020-12-16 RX ORDER — AMLODIPINE BESYLATE 2.5 MG/1
5 TABLET ORAL DAILY
Refills: 0 | Status: DISCONTINUED | OUTPATIENT
Start: 2020-12-16 | End: 2020-12-18

## 2020-12-16 RX ORDER — PANTOPRAZOLE SODIUM 20 MG/1
40 TABLET, DELAYED RELEASE ORAL
Refills: 0 | Status: DISCONTINUED | OUTPATIENT
Start: 2020-12-16 | End: 2020-12-19

## 2020-12-16 RX ORDER — HEPARIN SODIUM 5000 [USP'U]/ML
5000 INJECTION INTRAVENOUS; SUBCUTANEOUS EVERY 12 HOURS
Refills: 0 | Status: DISCONTINUED | OUTPATIENT
Start: 2020-12-16 | End: 2020-12-19

## 2020-12-16 RX ORDER — INFLUENZA VIRUS VACCINE 15; 15; 15; 15 UG/.5ML; UG/.5ML; UG/.5ML; UG/.5ML
0.5 SUSPENSION INTRAMUSCULAR ONCE
Refills: 0 | Status: COMPLETED | OUTPATIENT
Start: 2020-12-16 | End: 2020-12-19

## 2020-12-16 RX ORDER — HYDRALAZINE HCL 50 MG
1 TABLET ORAL
Qty: 0 | Refills: 0 | DISCHARGE

## 2020-12-16 RX ORDER — GABAPENTIN 400 MG/1
300 CAPSULE ORAL AT BEDTIME
Refills: 0 | Status: DISCONTINUED | OUTPATIENT
Start: 2020-12-16 | End: 2020-12-19

## 2020-12-16 RX ORDER — RISPERIDONE 4 MG/1
2 TABLET ORAL AT BEDTIME
Refills: 0 | Status: DISCONTINUED | OUTPATIENT
Start: 2020-12-16 | End: 2020-12-19

## 2020-12-16 RX ORDER — ATORVASTATIN CALCIUM 80 MG/1
40 TABLET, FILM COATED ORAL AT BEDTIME
Refills: 0 | Status: DISCONTINUED | OUTPATIENT
Start: 2020-12-16 | End: 2020-12-19

## 2020-12-16 RX ORDER — ERYTHROPOIETIN 10000 [IU]/ML
4000 INJECTION, SOLUTION INTRAVENOUS; SUBCUTANEOUS
Refills: 0 | Status: DISCONTINUED | OUTPATIENT
Start: 2020-12-16 | End: 2020-12-19

## 2020-12-16 RX ORDER — SENNA PLUS 8.6 MG/1
2 TABLET ORAL AT BEDTIME
Refills: 0 | Status: DISCONTINUED | OUTPATIENT
Start: 2020-12-16 | End: 2020-12-19

## 2020-12-16 RX ORDER — SEVELAMER CARBONATE 2400 MG/1
800 POWDER, FOR SUSPENSION ORAL
Refills: 0 | Status: DISCONTINUED | OUTPATIENT
Start: 2020-12-16 | End: 2020-12-19

## 2020-12-16 RX ORDER — MONTELUKAST 4 MG/1
1 TABLET, CHEWABLE ORAL
Qty: 0 | Refills: 0 | DISCHARGE

## 2020-12-16 RX ORDER — RISPERIDONE 4 MG/1
1 TABLET ORAL
Qty: 60 | Refills: 0

## 2020-12-16 RX ORDER — IPRATROPIUM/ALBUTEROL SULFATE 18-103MCG
3 AEROSOL WITH ADAPTER (GRAM) INHALATION
Qty: 30 | Refills: 0

## 2020-12-16 RX ORDER — AMLODIPINE BESYLATE 2.5 MG/1
1 TABLET ORAL
Qty: 0 | Refills: 0 | DISCHARGE

## 2020-12-16 RX ADMIN — ERYTHROPOIETIN 4000 UNIT(S): 10000 INJECTION, SOLUTION INTRAVENOUS; SUBCUTANEOUS at 12:11

## 2020-12-16 RX ADMIN — HEPARIN SODIUM 5000 UNIT(S): 5000 INJECTION INTRAVENOUS; SUBCUTANEOUS at 06:27

## 2020-12-16 RX ADMIN — AMLODIPINE BESYLATE 5 MILLIGRAM(S): 2.5 TABLET ORAL at 06:27

## 2020-12-16 RX ADMIN — MIRTAZAPINE 15 MILLIGRAM(S): 45 TABLET, ORALLY DISINTEGRATING ORAL at 21:12

## 2020-12-16 RX ADMIN — HEPARIN SODIUM 5000 UNIT(S): 5000 INJECTION INTRAVENOUS; SUBCUTANEOUS at 17:20

## 2020-12-16 RX ADMIN — ALBUTEROL 2 PUFF(S): 90 AEROSOL, METERED ORAL at 07:40

## 2020-12-16 RX ADMIN — RISPERIDONE 2 MILLIGRAM(S): 4 TABLET ORAL at 21:12

## 2020-12-16 RX ADMIN — SENNA PLUS 2 TABLET(S): 8.6 TABLET ORAL at 21:12

## 2020-12-16 RX ADMIN — BUDESONIDE AND FORMOTEROL FUMARATE DIHYDRATE 2 PUFF(S): 160; 4.5 AEROSOL RESPIRATORY (INHALATION) at 21:13

## 2020-12-16 RX ADMIN — SEVELAMER CARBONATE 800 MILLIGRAM(S): 2400 POWDER, FOR SUSPENSION ORAL at 06:13

## 2020-12-16 RX ADMIN — QUETIAPINE FUMARATE 50 MILLIGRAM(S): 200 TABLET, FILM COATED ORAL at 21:12

## 2020-12-16 RX ADMIN — PANTOPRAZOLE SODIUM 40 MILLIGRAM(S): 20 TABLET, DELAYED RELEASE ORAL at 06:27

## 2020-12-16 RX ADMIN — ATORVASTATIN CALCIUM 40 MILLIGRAM(S): 80 TABLET, FILM COATED ORAL at 21:13

## 2020-12-16 RX ADMIN — SEVELAMER CARBONATE 800 MILLIGRAM(S): 2400 POWDER, FOR SUSPENSION ORAL at 17:20

## 2020-12-16 RX ADMIN — GABAPENTIN 300 MILLIGRAM(S): 400 CAPSULE ORAL at 21:15

## 2020-12-16 NOTE — H&P ADULT - PROBLEM SELECTOR PLAN 3
Pt has hx of scizophrenia   on risperidone , quitiapine , mirtazapine   will continue all psych meds at home doses

## 2020-12-16 NOTE — H&P ADULT - NSICDXPASTMEDICALHX_GEN_ALL_CORE_FT
PAST MEDICAL HISTORY:  Anxiety     Asthma with COPD     Bipolar disorder     COPD (chronic obstructive pulmonary disease)     Diabetes mellitus, type 2     DM (diabetes mellitus)     ESRD on dialysis     HTN (hypertension)     Hypertension     Migraine     Morbid obesity with BMI of 40.0-44.9, adult     Renal failure     Schizophrenia

## 2020-12-16 NOTE — H&P ADULT - PROBLEM SELECTOR PLAN 9
RISK                                                          Points  [] Previous VTE                                           3  [] Thrombophilia                                        2  [] Lower limb paralysis                              2   [] Current Cancer                                       2   [x] Immobilization > 24 hrs                        1  [] ICU/CCU stay > 24 hours                       1  [x] Age > 60                                                   1    sc heparin as esrd scd boots

## 2020-12-16 NOTE — ED ADULT NURSE REASSESSMENT NOTE - NS ED NURSE REASSESS COMMENT FT1
1.30 am dinner given ,pt ate everything . on nc 3 lpm continuos  use  saturating very good .no chest pain ,SOB or any distress noted .comfortably playing VIDEO game in phone .report given to EDD SANCHEZ in  HOLDING .transferred the pt to holding in stable condition . pt is on remote tele monitoring BOX [address]

## 2020-12-16 NOTE — H&P ADULT - PROBLEM SELECTOR PLAN 1
- p/w 2 falls ,  feeling shakiness and off balance in knees before episodes  no incontinence of urine /feces, no tongue bite  - EKG: NSR   - Trops 1st 0.112   - f/u Trops 2nd , 3rd set   - CT head showed no hemorrhage ,   - Tele monitoring   - f/u Orthostatics   - f/u carotid doppler   - f/u Echo  - f/u PT Eval   [facility] ? will need psych eval of meds ?  neurologist Eulogio - p/w 2 falls ,  feeling shakiness and off balance in knees before episodes  no incontinence of urine /feces, no tongue bite  - EKG: NSR   - Trops 1st 0.112 -most likely from esrd cr 8  - f/u Trops 2nd , 3rd set   - CT head showed no hemorrhage ,   - Tele monitoring   - f/u Orthostatics   - f/u carotid doppler   - f/u Echo  - f/u PT Eval   [facility] ? will need psych eval of meds ?  neurologist Eulogio - p/w 2 unwitnessed falls ,  feeling shakiness and off balance in knees before episodes  no incontinence of urine /feces, no tongue bite  - EKG: NSR   - Trops 1st 0.112 -most likely from esrd cr 8  - f/u Trops 2nd , 3rd set   - CT head showed no hemorrhage ,   - Tele monitoring   - f/u Orthostatics   - f/u carotid doppler   - f/u Echo  - f/u PT Eval   [facility] ? will need psych eval of meds ?  -F/u EEG to r/o seizure foci  neurologist Eulogio

## 2020-12-16 NOTE — H&P ADULT - ATTENDING COMMENTS
Pt seen at bedside  Case discussed with MAR.  Pt known to me from prior admission.  40 year old man with multiple medical problems including obesity, DM2, HTN, ESRD on HD, CHF, copd with chronic resp failure on O2 here on account of 2 episodes of unwitnessed LOC. There was no premonition or aura preceding any of this episodes. There was no cardiac or neuro symptoms. He does not know how long he was unconscious but estimates between 30 - 45 minutes for both episodes.  There is no evidence of tongue laceration from a tongue biting. He c/o headache and generalized body aches afterwards.    Vital Signs Last 24 Hrs  T(C): 37.7 (15 Dec 2020 23:35), Max: 37.7 (15 Dec 2020 23:35)  T(F): 99.9 (15 Dec 2020 23:35), Max: 99.9 (15 Dec 2020 23:35)  HR: 79 (15 Dec 2020 23:35) (74 - 79)  BP: 172/82 (15 Dec 2020 23:35) (172/82 - 176/93)  RR: 20 (15 Dec 2020 23:35) (16 - 20)  SpO2: 99% (15 Dec 2020 23:35) (99% - 99%)    Exam      Labs                        10.8   7.40  )-----------( 219      ( 15 Dec 2020 21:16 )             35.4     12-15    134<L>  |  99  |  71<H>  ----------------------------<  103<H>  4.6   |  23  |  8.36<H>    Ca    10.5      15 Dec 2020 21:16  Mg     2.3     12-15  TPro  8.1  /  Alb  3.1<L>  /  TBili  0.4  /  DBili  x   /  AST  20  /  ALT  16  /  AlkPhos  172<H>  12-15    CARDIAC MARKERS ( 15 Dec 2020 21:16 )  0.112 ng/mL / x     / x     / x     / x        EKG - NSR - LAFB- unchanged from baseline  Head CT - no acute intracranial issues      Impression  - Acute transient encephalopathy - unwitnessed  Seizure vs ?? syncope  Unlikely for syncope to last up to 30 minutes ( if the patient's timing is accurate)  While there is no lateral tongue laceration or other symptoms, this presentation would fit more into a seizure differential.  r/o seizure episode especially secondary causes - metabolic or toxic     Plan  - Admit to telemetry  - Serial trop  - Check ECHO   - B/L carotid dopplers  - EEG   - Neurology consult  - HD today - nephrology consult    Other Plan for primary medical problems as above

## 2020-12-16 NOTE — H&P ADULT - NSHPPHYSICALEXAM_GEN_ALL_CORE
PHYSICAL EXAM:  GENERAL: NAD, speaks in full sentences, no signs of respiratory distress, tremors present in arms and goes away with clutching anything in hands  EYES: EOMI, PERRLA, conjunctiva and sclera clear  NECK: Supple, No JVD  CHEST/LUNG: No wheeze; No crackles; No accessory muscles used  HEART:s1 s2 No murmurs;   ABDOMEN: Soft, Nontender, Nondistended; Bowel sounds present; No guarding  EXTREMITIES:  2+ Peripheral Pulses, No cyanosis or edema  PSYCH: AAOx3  NEUROLOGY: no-focal deficit  SKIN: chronic skin changes present     Vital Signs Last 24 Hrs  T(C): 37.7 (15 Dec 2020 23:35), Max: 37.7 (15 Dec 2020 23:35)  T(F): 99.9 (15 Dec 2020 23:35), Max: 99.9 (15 Dec 2020 23:35)  HR: 79 (15 Dec 2020 23:35) (74 - 79)  BP: 172/82 (15 Dec 2020 23:35) (172/82 - 176/93)  BP(mean): --  RR: 20 (15 Dec 2020 23:35) (16 - 20)  SpO2: 99% (15 Dec 2020 23:35) (99% - 99%)

## 2020-12-16 NOTE — H&P ADULT - PROBLEM SELECTOR PLAN 2
Pt is on dialysis ( M /W /F )  LAST DIALYsis was on monday  pt for hd in am   last admission Dr. La was nephrologist   will consult Dr. La

## 2020-12-16 NOTE — H&P ADULT - ASSESSMENT
Patient is 40 year old Male from home lives with mother, extensive PMHx including T2DM, HTN, ESRD (MWF), CHF, COPD on home O2 (3L), bipolar disorder, schizophrenia, depression  BIBA presenting after 2 unwitnessed syncopal episodes at home. Admitted for syncopy workup.

## 2020-12-16 NOTE — H&P ADULT - PROBLEM SELECTOR PLAN 5
not on any meds at home   will start sliding scale for now  f/u hba1c not on any meds at home   will hold off sliding scale for now in setting of esrd cr 8  acuchecks q6  f/u hba1c

## 2020-12-17 LAB
ALBUMIN SERPL ELPH-MCNC: 3.3 G/DL — LOW (ref 3.5–5)
ALP SERPL-CCNC: 176 U/L — HIGH (ref 40–120)
ALT FLD-CCNC: 16 U/L DA — SIGNIFICANT CHANGE UP (ref 10–60)
ANION GAP SERPL CALC-SCNC: 12 MMOL/L — SIGNIFICANT CHANGE UP (ref 5–17)
APPEARANCE UR: CLEAR — SIGNIFICANT CHANGE UP
AST SERPL-CCNC: 8 U/L — LOW (ref 10–40)
BACTERIA # UR AUTO: ABNORMAL /HPF
BILIRUB SERPL-MCNC: 0.4 MG/DL — SIGNIFICANT CHANGE UP (ref 0.2–1.2)
BILIRUB UR-MCNC: NEGATIVE — SIGNIFICANT CHANGE UP
BUN SERPL-MCNC: 57 MG/DL — HIGH (ref 7–18)
CALCIUM SERPL-MCNC: 10.5 MG/DL — SIGNIFICANT CHANGE UP (ref 8.4–10.5)
CHLORIDE SERPL-SCNC: 95 MMOL/L — LOW (ref 96–108)
CO2 SERPL-SCNC: 27 MMOL/L — SIGNIFICANT CHANGE UP (ref 22–31)
COLOR SPEC: YELLOW — SIGNIFICANT CHANGE UP
COMMENT - URINE: SIGNIFICANT CHANGE UP
CREAT SERPL-MCNC: 8.01 MG/DL — HIGH (ref 0.5–1.3)
DIFF PNL FLD: ABNORMAL
EPI CELLS # UR: ABNORMAL /HPF
GLUCOSE SERPL-MCNC: 89 MG/DL — SIGNIFICANT CHANGE UP (ref 70–99)
GLUCOSE UR QL: 100 MG/DL
GRAN CASTS # UR COMP ASSIST: ABNORMAL /LPF
HCT VFR BLD CALC: 37.4 % — LOW (ref 39–50)
HGB BLD-MCNC: 11.4 G/DL — LOW (ref 13–17)
HYALINE CASTS # UR AUTO: ABNORMAL /LPF
KETONES UR-MCNC: NEGATIVE — SIGNIFICANT CHANGE UP
LEUKOCYTE ESTERASE UR-ACNC: ABNORMAL
MAGNESIUM SERPL-MCNC: 2.2 MG/DL — SIGNIFICANT CHANGE UP (ref 1.6–2.6)
MCHC RBC-ENTMCNC: 29.4 PG — SIGNIFICANT CHANGE UP (ref 27–34)
MCHC RBC-ENTMCNC: 30.5 GM/DL — LOW (ref 32–36)
MCV RBC AUTO: 96.4 FL — SIGNIFICANT CHANGE UP (ref 80–100)
NITRITE UR-MCNC: NEGATIVE — SIGNIFICANT CHANGE UP
NRBC # BLD: 0 /100 WBCS — SIGNIFICANT CHANGE UP (ref 0–0)
PH UR: 7 — SIGNIFICANT CHANGE UP (ref 5–8)
PHOSPHATE SERPL-MCNC: 7.3 MG/DL — HIGH (ref 2.5–4.5)
PLATELET # BLD AUTO: 236 K/UL — SIGNIFICANT CHANGE UP (ref 150–400)
POTASSIUM SERPL-MCNC: 4.3 MMOL/L — SIGNIFICANT CHANGE UP (ref 3.5–5.3)
POTASSIUM SERPL-SCNC: 4.3 MMOL/L — SIGNIFICANT CHANGE UP (ref 3.5–5.3)
PROT SERPL-MCNC: 8.3 G/DL — SIGNIFICANT CHANGE UP (ref 6–8.3)
PROT UR-MCNC: 500 MG/DL
RBC # BLD: 3.88 M/UL — LOW (ref 4.2–5.8)
RBC # FLD: 14.6 % — HIGH (ref 10.3–14.5)
RBC CASTS # UR COMP ASSIST: ABNORMAL /HPF (ref 0–2)
SARS-COV-2 IGG SERPL QL IA: NEGATIVE — SIGNIFICANT CHANGE UP
SARS-COV-2 IGM SERPL IA-ACNC: <0.1 INDEX — SIGNIFICANT CHANGE UP
SODIUM SERPL-SCNC: 134 MMOL/L — LOW (ref 135–145)
SP GR SPEC: 1 — LOW (ref 1.01–1.02)
UROBILINOGEN FLD QL: NEGATIVE — SIGNIFICANT CHANGE UP
WBC # BLD: 7.12 K/UL — SIGNIFICANT CHANGE UP (ref 3.8–10.5)
WBC # FLD AUTO: 7.12 K/UL — SIGNIFICANT CHANGE UP (ref 3.8–10.5)
WBC UR QL: SIGNIFICANT CHANGE UP /HPF (ref 0–5)

## 2020-12-17 PROCEDURE — 99223 1ST HOSP IP/OBS HIGH 75: CPT

## 2020-12-17 PROCEDURE — 95819 EEG AWAKE AND ASLEEP: CPT | Mod: 26

## 2020-12-17 PROCEDURE — 99232 SBSQ HOSP IP/OBS MODERATE 35: CPT | Mod: GC

## 2020-12-17 PROCEDURE — 93306 TTE W/DOPPLER COMPLETE: CPT | Mod: 26

## 2020-12-17 RX ADMIN — MIRTAZAPINE 15 MILLIGRAM(S): 45 TABLET, ORALLY DISINTEGRATING ORAL at 21:46

## 2020-12-17 RX ADMIN — RISPERIDONE 2 MILLIGRAM(S): 4 TABLET ORAL at 21:46

## 2020-12-17 RX ADMIN — GABAPENTIN 300 MILLIGRAM(S): 400 CAPSULE ORAL at 21:47

## 2020-12-17 RX ADMIN — HEPARIN SODIUM 5000 UNIT(S): 5000 INJECTION INTRAVENOUS; SUBCUTANEOUS at 17:32

## 2020-12-17 RX ADMIN — HEPARIN SODIUM 5000 UNIT(S): 5000 INJECTION INTRAVENOUS; SUBCUTANEOUS at 05:04

## 2020-12-17 RX ADMIN — SEVELAMER CARBONATE 800 MILLIGRAM(S): 2400 POWDER, FOR SUSPENSION ORAL at 08:42

## 2020-12-17 RX ADMIN — BUDESONIDE AND FORMOTEROL FUMARATE DIHYDRATE 2 PUFF(S): 160; 4.5 AEROSOL RESPIRATORY (INHALATION) at 21:46

## 2020-12-17 RX ADMIN — QUETIAPINE FUMARATE 50 MILLIGRAM(S): 200 TABLET, FILM COATED ORAL at 21:46

## 2020-12-17 RX ADMIN — ATORVASTATIN CALCIUM 40 MILLIGRAM(S): 80 TABLET, FILM COATED ORAL at 21:46

## 2020-12-17 RX ADMIN — PANTOPRAZOLE SODIUM 40 MILLIGRAM(S): 20 TABLET, DELAYED RELEASE ORAL at 06:21

## 2020-12-17 RX ADMIN — SEVELAMER CARBONATE 800 MILLIGRAM(S): 2400 POWDER, FOR SUSPENSION ORAL at 17:32

## 2020-12-17 RX ADMIN — SEVELAMER CARBONATE 800 MILLIGRAM(S): 2400 POWDER, FOR SUSPENSION ORAL at 12:14

## 2020-12-17 RX ADMIN — BUDESONIDE AND FORMOTEROL FUMARATE DIHYDRATE 2 PUFF(S): 160; 4.5 AEROSOL RESPIRATORY (INHALATION) at 12:14

## 2020-12-17 RX ADMIN — AMLODIPINE BESYLATE 5 MILLIGRAM(S): 2.5 TABLET ORAL at 05:04

## 2020-12-17 NOTE — PROGRESS NOTE ADULT - SUBJECTIVE AND OBJECTIVE BOX
[facility] : Progress Note :: [phone], (office [phone]),   Dr La / Dr Hui / Dr Barber / Dr Villalobos / Dr Hang CASTELLANO / Dr Mehta / Dr Sanchez / Dr Alber dumont  _____________________________________________________________________________________________   shakiness better    aspirin (Swelling)  fish (Unknown)  penicillin (Rash)  penicillins (Swelling)  shellfish (Rash)  shellfish (Unknown)    Hospital Medications:   MEDICATIONS  (STANDING):  amLODIPine   Tablet 5 milliGRAM(s) Oral daily  atorvastatin 40 milliGRAM(s) Oral at bedtime  budesonide 160 MICROgram(s)/formoterol 4.5 MICROgram(s) Inhaler 2 Puff(s) Inhalation two times a day  epoetin cyndie-epbx (RETACRIT) Injectable 4000 Unit(s) IV Push <User Schedule>  gabapentin 300 milliGRAM(s) Oral at bedtime  heparin   Injectable 5000 Unit(s) SubCutaneous every 12 hours  influenza   Vaccine 0.5 milliLiter(s) IntraMuscular once  mirtazapine 15 milliGRAM(s) Oral at bedtime  pantoprazole    Tablet 40 milliGRAM(s) Oral before breakfast  QUEtiapine 50 milliGRAM(s) Oral at bedtime  risperiDONE   Tablet 2 milliGRAM(s) Oral at bedtime  senna 2 Tablet(s) Oral at bedtime  sevelamer carbonate 800 milliGRAM(s) Oral three times a day with meals        VITALS:  T(F): 97.5 (20 @ 11:30), Max: 98.6 (20 @ 19:30)  HR: 84 (20 @ 11:30)  BP: 172/95 (20 @ 11:30)  RR: 20 (20 @ 11:30)  SpO2: 97% (20 @ 11:30)  Wt(kg): --     @ 07:01  -   @ 07:00  --------------------------------------------------------  IN: 775 mL / OUT: 4850 mL / NET: -4075 mL        PHYSICAL EXAM:  Constitutional: NAD  HEENT: anicteric sclera, oropharynx clear.  Neck: No JVD  Respiratory: CTAB, no wheezes, rales or rhonchi  Cardiovascular: S1, S2, RRR  Gastrointestinal: BS+, soft, NT/ND  Extremities:  No peripheral edema  Neurological: A/O x 3, no focal deficits  : No CVA tenderness. No hernandez.   Skin: No rashes  Vascular Access: RUEAVF with aneurysm    LABS:      134<L>  |  95<L>  |  57<H>  ----------------------------<  89  4.3   |  27  |  8.01<H>    Ca    10.5      17 Dec 2020 09:05  Phos  7.3       Mg     2.2         TPro  8.3  /  Alb  3.3<L>  /  TBili  0.4  /  DBili      /  AST  8<L>  /  ALT  16  /  AlkPhos  176<H>      Creatinine Trend: 8.01 <--, 9.25 <--, 8.36 <--                        11.4   7.12  )-----------( 236      ( 17 Dec 2020 09:05 )             37.4     Urine Studies:  Urinalysis Basic - ( 17 Dec 2020 07:10 )    Color: Yellow / Appearance: Clear / S.005 / pH:   Gluc:  / Ketone: Negative  / Bili: Negative / Urobili: Negative   Blood:  / Protein: 500 mg/dL / Nitrite: Negative   Leuk Esterase: Trace / RBC: 2-5 /HPF / WBC 3-5 /HPF   Sq Epi:  / Non Sq Epi: Moderate /HPF / Bacteria: Few /HPF        RADIOLOGY & ADDITIONAL STUDIES:

## 2020-12-17 NOTE — CONSULT NOTE ADULT - ASSESSMENT
Asterixis and distal bilateral UE tremors due to chronic renal failure.      It is of note that he has av low frequency (6 Hz) posterior dominant rhythm and generalized slowing on EEG, all consonant with cerebral effects of CKD.      I note alk phos elevated (162, 176).  Bilirubin, Alt, INR WNL; AST borderline low (8, 9)    No further neurologic investigation or evaluation needed for the tremors at this time.  Management consists of managing his renal and metabolic problems.  In addition, recommend checking GGT and ammonia levels for evidence of possible subtle hepatic dysfunction.  
# ESRD HD today with UF as tolerated  # HTN. NOrvasc resumed  # anemia of CKD. hgb at goal. cont retacrit on HD   # CKDMBD cont sevelamer  # tremor. suggest pschiatry eval to adjust antipsychotic medications  # syncope- per primary team.    Thanks for the consultation

## 2020-12-17 NOTE — PROGRESS NOTE ADULT - SUBJECTIVE AND OBJECTIVE BOX
PGY-1 Progress Note discussed with attending    PAGER #: [[phone]] TILL 5:00 PM  PLEASE CONTACT ON CALL TEAM:  - On Call Team (Please refer to Janine) FROM 5:00 PM - 8:30PM  - Nightfloat Team FROM 8:30 -7:30 AM    INTERVAL HPI/OVERNIGHT EVENTS:   - No acute events overnight. Patient states that his fall was due to upper and lower extremity tremors. He also complains of rashes on his tattoos. He denies fever, chills, n/v, sob, chest pain, abdominal pain, urinary or bowel movement changes.    REVIEW OF SYSTEMS:  CONSTITUTIONAL: No fever, weight loss, or fatigue  RESPIRATORY: No cough, wheezing, chills or hemoptysis; No shortness of breath  CARDIOVASCULAR: No chest pain, palpitations, dizziness, or leg swelling  GASTROINTESTINAL: No abdominal pain. No nausea, vomiting, or hematemesis; No diarrhea or constipation. No melena or hematochezia.  GENITOURINARY: No dysuria or hematuria, urinary frequency  NEUROLOGICAL: No headaches, memory loss, loss of strength, numbness, or tremors  SKIN: No itching, burning, rashes, or lesions     MEDICATIONS  (STANDING):  amLODIPine   Tablet 5 milliGRAM(s) Oral daily  atorvastatin 40 milliGRAM(s) Oral at bedtime  budesonide 160 MICROgram(s)/formoterol 4.5 MICROgram(s) Inhaler 2 Puff(s) Inhalation two times a day  epoetin cyndie-epbx (RETACRIT) Injectable 4000 Unit(s) IV Push <User Schedule>  gabapentin 300 milliGRAM(s) Oral at bedtime  heparin   Injectable 5000 Unit(s) SubCutaneous every 12 hours  influenza   Vaccine 0.5 milliLiter(s) IntraMuscular once  mirtazapine 15 milliGRAM(s) Oral at bedtime  pantoprazole    Tablet 40 milliGRAM(s) Oral before breakfast  QUEtiapine 50 milliGRAM(s) Oral at bedtime  risperiDONE   Tablet 2 milliGRAM(s) Oral at bedtime  senna 2 Tablet(s) Oral at bedtime  sevelamer carbonate 800 milliGRAM(s) Oral three times a day with meals    MEDICATIONS  (PRN):  ALBUTerol    90 MICROgram(s) HFA Inhaler 2 Puff(s) Inhalation every 6 hours PRN Shortness of Breath and/or Wheezing      Vital Signs Last 24 Hrs  T(C): 36.4 (17 Dec 2020 11:30), Max: 37 (16 Dec 2020 19:30)  T(F): 97.5 (17 Dec 2020 11:30), Max: 98.6 (16 Dec 2020 19:30)  HR: 84 (17 Dec 2020 11:30) (80 - 87)  BP: 172/95 (17 Dec 2020 11:30) (147/88 - 185/100)  BP(mean): --  RR: 20 (17 Dec 2020 11:30) (14 - 20)  SpO2: 97% (17 Dec 2020 11:30) (97% - 100%)    PHYSICAL EXAMINATION:  GENERAL: obese, NAD, AAOx3  HEAD: AT/NC  EYES: conjunctiva and sclera clear  NECK: supple, No JVD noted, Normal thyroid  CHEST/LUNG: CTABL; no rales, rhonchi, wheezing, or rubs  HEART: regular rate and rhythm; no murmurs, rubs, or gallops  ABDOMEN: soft, nontender, nondistended; Bowel sounds present  EXTREMITIES:  2+ Peripheral Pulses, No clubbing, cyanosis, or edema  SKIN: rashes noted on tattoo on left arm and right leg; two enlarge AV fistula noted on left arm                           11.4   7.12  )-----------( 236      ( 17 Dec 2020 09:05 )             37.4     12-17    134<L>  |  95<L>  |  57<H>  ----------------------------<  89  4.3   |  27  |  8.01<H>    Ca    10.5      17 Dec 2020 09:05  Phos  7.3     12-17  Mg     2.2     12-17    TPro  8.3  /  Alb  3.3<L>  /  TBili  0.4  /  DBili  x   /  AST  8<L>  /  ALT  16  /  AlkPhos  176<H>  12-17    LIVER FUNCTIONS - ( 17 Dec 2020 09:05 )  Alb: 3.3 g/dL / Pro: 8.3 g/dL / ALK PHOS: 176 U/L / ALT: 16 U/L DA / AST: 8 U/L / GGT: x           CARDIAC MARKERS ( 16 Dec 2020 12:26 )  0.083 ng/mL / x     / x     / x     / x      CARDIAC MARKERS ( 16 Dec 2020 05:58 )  0.102 ng/mL / x     / x     / x     / x      CARDIAC MARKERS ( 15 Dec 2020 21:16 )  0.112 ng/mL / x     / x     / x     / x            COVID-19 PCR: NotDetec (16 Dec 2020 00:53)      CAPILLARY BLOOD GLUCOSE          RADIOLOGY & ADDITIONAL TESTS:

## 2020-12-17 NOTE — CONSULT NOTE ADULT - SUBJECTIVE AND OBJECTIVE BOX
History per Admission H&P of 12/16/20:    <Start of quote from H&P>  "History of Present Illness:   Patient is 40 year old Male from home lives with mother, extensive PMHx including T2DM, HTN, ESRD (MWF), CHF, COPD on home O2 (3L), bipolar disorder, schizophrenia,depression BIBA presenting after 2 unwitnessed syncopal episodes at home. Pt reports that around 3 pm today {Neurology Attending addendum: the "today" was actually 12/15/20}, he was feeling his usual shakiness ( started 2 weeks ago, goes away with cluching hands )  while getting a drink and woke up on the ground. His estimated down time was about 30 minutes. The patient then reports that he went to his room, walked back into the kitchen to get a drink, relaxed in his room, and then had another syncopal episode when he went to put the cup in the sink. The patient reports remembering his legs giving out from under him ( knees) and then falling backwards. The patient estimates down time to be about 45 minutes. The patient reports he has been having shakiness since 2 weeks. The patient endorses left knee pain, but denies any back pain. The patient reports posterior headache where he thinks he may have hit his head, but denies visual changes, numbness, tingling, weakness. The patient is on antipsychotic meds only. The patient reports shortness of breath, but states that this is chronic given his COPD take sno meds for other medical problems. The patient denies fever, chills, chest pain, palpitations, abdominal pain, nausea, vomiting, changes in bowel movement, changes in urination. Pt denies any dizziness , aura before fall, ringing in ear, ear fullness, migrain hx. Pt donot know if there was any recent medication change as his mother take care of all his meds.       Review of Systems:  Other Review of Systems: All other review of systems negative, except as noted in HPI      Allergies and Intolerances:        Allergies:  	penicillin: Drug, Rash  	shellfish: Food, Rash  	aspirin: Drug, Swelling  	penicillins: Drug Category, Swelling  	shellfish: Food, Unknown  	fish: Food, Unknown    Home Medications:   * Patient Currently Takes Medications as of 16-Dec-2020 02:48 documented in Structured Notes  · 	risperiDONE 1 mg oral tablet: Last Dose Taken:  , 2 tab(s) orally once a dayat bed time  · 	sevelamer carbonate 800 mg oral tablet: Last Dose Taken:  , 1 tab(s) orally 3 times a day (with meals)  · 	gabapentin 300 mg oral capsule: Last Dose Taken:  , 1 cap(s) orally once a day (at bedtime)  · 	QUEtiapine 50 mg oral tablet, extended release: Last Dose Taken:  , 1 tab(s) orally once a day (in the evening)  · 	mirtazapine 15 mg oral tablet: 1 tab(s) orally once a day (at bedtime)    Patient History:    Past Medical, Past Surgical, and Family History:  PAST MEDICAL HISTORY:  Anxiety     Asthma with COPD     Bipolar disorder     COPD (chronic obstructive pulmonary disease)     Diabetes mellitus, type 2     DM (diabetes mellitus)     ESRD on dialysis     HTN (hypertension)     Hypertension     Migraine     Morbid obesity with BMI of 40.0-44.9, adult     Renal failure     Schizophrenia.     PAST SURGICAL HISTORY:  H/O hernia repair     S/P arteriovenous (AV) fistula creation.     Social History:  Social History (marital status, living situation, occupation, tobacco use, alcohol and drug use, and sexual history): no smoking, drugs, alcohol     Tobacco Screening:  · Core Measure Site	Yes  · Has the patient used tobacco in the past 30 days?	No    Risk Assessment:    Present on Admission:  Deep Venous Thrombosis	no  Pulmonary Embolus	no     Heart Failure:  Does this patient have a history of or has been diagnosed with heart failure? yes."  <Start of quote from H&P>      ADDITIONAL HISTORY (Per my interviewing Pt):    Started on hemodialysis 4 years ago.  Stared about two weeks marianne has been having bilat hand tremors at the wrists; independently in the hands.  Not present when completely relaxed.  Develop on sustention or in preparation for movemnt or during maintenance of posture.   Tremors are "much better" after a dialysis session; last session was yesterday PM.  He has not negotiated stairs "for a long time."    After seeing the Pt I note that an EEG report was entered in the EMR.  From that report I quote selected sections:    " . . . Findings: The background was continuous, spontaneously variable and reactive. During wakefulness, fragments of posterior dominant rhythm consisted of symmetric, poorly modulated 6 Hz activity, with amplitude to 30 uV, that attenuated to eye opening.  Low amplitude frontal beta was noted in wakefulness.    Background Slowing:  Diffuse theta and polymorphic delta slowing.    Focal Slowing:   None were present.     . . .   _____________________________________________________________  EEG SUMMARY/CLASSIFICATION    Abnormal EEG in the awake, drowsy states.  - Moderate generalized slowing.    _____________________________________________________________  EEG IMPRESSION/CLINICAL CORRELATE    Abnormal EEG study.  1. Moderate nonspecific diffuse or multifocal cerebral dysfunction.   2. No epileptiform pattern or seizure seen."         EXAMINATION    Morbidly obese by visual observation.  Awake, alert.  Grossly normal comprehension, expressio, articulation, prosody and rate of speaking.  PERRL; meiotic pupils.  Confrontation visual fields grossly intact.  EOMI; no nystagmus.  Normal facial/lingual movements.  No UE drift.  Generalized weaknes of limbs with no grossly evident asymmetry or lateralized or focal deficits.    Sensation grossly intact to P, LT and vibration.    Reflex                           Right    Left   Comment    Biceps                              0        0  Triceps                            0         0  Patellar                            0         0  Gastroc                           0         0  Plantar                        mute    mute    FNF, eyes open/closed: no dysmetria (see below re tremor)  Toe-to-finger   no dysmetria.    Normal tonus four limbs.        On sustention and during activation there are asynchronous non-rhythmic extensor jerks independently at the wrists.  A classic flapping tremor (asterixis) can be elicited.  On sustention (UEs outstretched) at time s there is a low amplitude tremor at the fingers (independently affecting fingers) with flex/ext at the involved MCP joints.      
[facility] : Progress Note :: [phone], (office [phone]),   Dr La / Dr Hui / Dr Barber / Dr Villalobos / Dr Hang CASTELLANO / Dr Mehta / Dr Sanchez / Dr Alber dumont  _____________________________________________________________________________________________  Patient is a 40y Male whom presented to the hospital with 2 episodes unwitnessed syncope  Has ESRD on HD MWF at [facility]. he is compliant with dialysis in the past couple of months with good control of volume status.  s/p angioplasty of AVF last week.  He had 2 unwitnessed syncopal episodes while walking from his bedrooom to the kitchen.  In ED CT head unremarkable with mild elevation of trponin.  Also being ruled out for COVID-19  Complains also of shakiness of the upper extremities ( a chronic problem)  renal consulted for ESRD      PAST MEDICAL & SURGICAL HISTORY:  Anxiety    Schizophrenia    Hypertension    Diabetes mellitus, type 2    Asthma with COPD    Renal failure    COPD (chronic obstructive pulmonary disease)    Migraine    HTN (hypertension)    DM (diabetes mellitus)    Bipolar disorder    ESRD on dialysis    Morbid obesity with BMI of 40.0-44.9, adult    S/P arteriovenous (AV) fistula creation    H/O hernia repair      aspirin (Swelling)  fish (Unknown)  penicillin (Rash)  penicillins (Swelling)  shellfish (Rash)  shellfish (Unknown)    Home Medications Reviewed  Hospital Medications:   MEDICATIONS  (STANDING):  amLODIPine   Tablet 5 milliGRAM(s) Oral daily  atorvastatin 40 milliGRAM(s) Oral at bedtime  budesonide 160 MICROgram(s)/formoterol 4.5 MICROgram(s) Inhaler 2 Puff(s) Inhalation two times a day  epoetin cyndie-epbx (RETACRIT) Injectable 4000 Unit(s) IV Push <User Schedule>  gabapentin 300 milliGRAM(s) Oral at bedtime  heparin   Injectable 5000 Unit(s) SubCutaneous every 12 hours  influenza   Vaccine 0.5 milliLiter(s) IntraMuscular once  mirtazapine 15 milliGRAM(s) Oral at bedtime  pantoprazole    Tablet 40 milliGRAM(s) Oral before breakfast  QUEtiapine 50 milliGRAM(s) Oral at bedtime  risperiDONE   Tablet 2 milliGRAM(s) Oral at bedtime  senna 2 Tablet(s) Oral at bedtime  sevelamer carbonate 800 milliGRAM(s) Oral three times a day with meals    SOCIAL HISTORY:  Denies ETOh,Smoking,   FAMILY HISTORY:        VITALS:  T(F): 97.5 (12-16-20 @ 12:00), Max: 99.9 (12-15-20 @ 23:35)  HR: 83 (12-16-20 @ 12:00)  BP: 165/94 (12-16-20 @ 12:00)  RR: 16 (12-16-20 @ 12:00)  SpO2: 98% (12-16-20 @ 12:00)  Wt(kg): --    Height (cm): 200.7 (12-15 @ 19:38)  Weight (kg): 161 (12-16 @ 09:28)  BMI (kg/m2): 40 (12-16 @ 09:28)  BSA (m2): 2.91 (12-16 @ 09:28)  PHYSICAL EXAM:  Constitutional: NAD  HEENT: anicteric sclera, oropharynx man  Neck: No JVD  Respiratory: CTAB, no wheezes, rales or rhonchi  Cardiovascular: S1, S2, RRR  Gastrointestinal: BS+, soft, NT/ND  Extremities No peripheral edema  Neurological: A/O x 3, no focal deficits  Psychiatric: Normal mood, normal affect  : No CVA tenderness. No hernandez.   Skin: No rashes  Vascular Access: LUEAVF with aneurysm. thrill  and bruit    LABS:  12-16    134<L>  |  96  |  78<H>  ----------------------------<  111<H>  5.2   |  26  |  9.25<H>    Ca    10.9<H>      16 Dec 2020 05:58  Phos  8.1     12-16  Mg     2.3     12-16    TPro  8.0  /  Alb  3.2<L>  /  TBili  0.4  /  DBili      /  AST  9<L>  /  ALT  15  /  AlkPhos  162<H>  12-16    Creatinine Trend: 9.25 <--, 8.36 <--                        10.6   8.54  )-----------( 213      ( 16 Dec 2020 05:58 )             34.2     Urine Studies:      RADIOLOGY & ADDITIONAL STUDIES:                
    HISTORY OF PRESENT ILLNESS: HPI:  Patient is 40 year old Male from home lives with mother, extensive PMHx including T2DM, HTN, ESRD (MWF), CHF, COPD on home O2 (3L), bipolar disorder, schizophrenia,depression BIBA presenting after 2 unwitnessed syncopal episodes at home. Pt reports that around 3 pm today, he was feeling his usual shakiness ( started 2 weeks ago, goes away with cluching hands )  while getting a drink and woke up on the ground. His estimated down time was about 30 minutes. The patient then reports that he went to his room, walked back into the kitchen to get a drink, relaxed in his room, and then had another syncopal episode when he went to put the cup in the sink. The patient reports remembering his legs giving out from under him ( knees) and then falling backwards. The patient estimates down time to be about 45 minutes. The patient reports he has been having shakiness since 2 weeks. The patient endorses left knee pain, but denies any back pain. The patient reports posterior headache where he thinks he may have hit his head, but denies visual changes, numbness, tingling, weakness. The patient is on antipsychotic meds only. The patient reports shortness of breath, but states that this is chronic given his COPD take sno meds for other medical problems. The patient denies fever, chills, chest pain, palpitations, abdominal pain, nausea, vomiting, changes in bowel movement, changes in urination. Pt denies any dizziness , aura before fall, ringing in ear, ear fullness, migrain hx. Pt donot know if there was any recent medication change as his mother take care of all his meds.   (16 Dec 2020 02:35)      PAST MEDICAL & SURGICAL HISTORY:  Anxiety    Schizophrenia    Hypertension    Diabetes mellitus, type 2    Asthma with COPD    Renal failure    COPD (chronic obstructive pulmonary disease)    Migraine    HTN (hypertension)    DM (diabetes mellitus)    Bipolar disorder    ESRD on dialysis    Morbid obesity with BMI of 40.0-44.9, adult    S/P arteriovenous (AV) fistula creation    H/O hernia repair            MEDICATIONS:  MEDICATIONS  (STANDING):  amLODIPine   Tablet 5 milliGRAM(s) Oral daily  atorvastatin 40 milliGRAM(s) Oral at bedtime  budesonide 160 MICROgram(s)/formoterol 4.5 MICROgram(s) Inhaler 2 Puff(s) Inhalation two times a day  epoetin cyndie-epbx (RETACRIT) Injectable 4000 Unit(s) IV Push <User Schedule>  gabapentin 300 milliGRAM(s) Oral at bedtime  heparin   Injectable 5000 Unit(s) SubCutaneous every 12 hours  influenza   Vaccine 0.5 milliLiter(s) IntraMuscular once  mirtazapine 15 milliGRAM(s) Oral at bedtime  pantoprazole    Tablet 40 milliGRAM(s) Oral before breakfast  QUEtiapine 50 milliGRAM(s) Oral at bedtime  risperiDONE   Tablet 2 milliGRAM(s) Oral at bedtime  senna 2 Tablet(s) Oral at bedtime  sevelamer carbonate 800 milliGRAM(s) Oral three times a day with meals      Allergies    aspirin (Swelling)  fish (Unknown)  penicillin (Rash)  penicillins (Swelling)  shellfish (Rash)  shellfish (Unknown)    Intolerances        FAMILY HISTORY:    Non-contributary for premature coronary disease or sudden cardiac death    SOCIAL HISTORY:    [ X] Non-smoker  [ ] Smoker  [ ] Alcohol      REVIEW OF SYSTEMS:  [ ]chest pain  [  ]shortness of breath  [  ]palpitations  [  ]syncope  [ ]near syncope [ ]upper extremity weakness   [ ] lower extremity weakness  [  ]diplopia  [  ]altered mental status   [  ]fevers  [ ]chills [ ]nausea  [ ]vomitting  [  ]dysphagia    [ ]abdominal pain  [ ]melena  [ ]BRBPR    [  ]epistaxis  [  ]rash    [ ]lower extremity edema        [X ] All others negative	  [ ] Unable to obtain      LABS:	 	    CARDIAC MARKERS:  CARDIAC MARKERS ( 16 Dec 2020 12:26 )  0.083 ng/mL / x     / x     / x     / x      CARDIAC MARKERS ( 16 Dec 2020 05:58 )  0.102 ng/mL / x     / x     / x     / x      CARDIAC MARKERS ( 15 Dec 2020 21:16 )  0.112 ng/mL / x     / x     / x     / x                                  11.4   7.12  )-----------( 236      ( 17 Dec 2020 09:05 )             37.4     Hb Trend: 11.4<--    12-17    134<L>  |  95<L>  |  57<H>  ----------------------------<  89  4.3   |  27  |  8.01<H>    Ca    10.5      17 Dec 2020 09:05  Phos  7.3     12-17  Mg     2.2     12-17    TPro  8.3  /  Alb  3.3<L>  /  TBili  0.4  /  DBili  x   /  AST  8<L>  /  ALT  16  /  AlkPhos  176<H>  12-17    Creatinine Trend: 8.01<--, 9.25<--, 8.36<--          PHYSICAL EXAM:  T(C): 36.4 (12-17-20 @ 11:30), Max: 37 (12-16-20 @ 19:30)  HR: 84 (12-17-20 @ 11:30) (80 - 87)  BP: 172/95 (12-17-20 @ 11:30) (147/88 - 185/100)  RR: 20 (12-17-20 @ 11:30) (14 - 20)  SpO2: 97% (12-17-20 @ 11:30) (97% - 100%)  Wt(kg): --   BMI (kg/m2): 40 (12-16-20 @ 09:28)  I&O's Summary    16 Dec 2020 07:01  -  17 Dec 2020 07:00  --------------------------------------------------------  IN: 775 mL / OUT: 4850 mL / NET: -4075 mL      HEENT:  (-)icterus (-)pallor  CV: N S1 S2 1/6 ERNESTINE (+)2 Pulses B/l  Resp:  Clear to ausculatation B/L, normal effort  GI: (+) BS Soft, NT, ND  Lymph:  (-)Edema, (-)obvious lymphadenopathy  Skin: Warm to touch, Normal turgor  Psych: Appropriate mood and affect        TELEMETRY: 	  sinus    ECG:  	Sinus 76 BPM, LAFB    RADIOLOGY:         CXR:    < from: Xray Chest 1 View-PORTABLE IMMEDIATE (12.15.20 @ 20:55) >  Pulmonary vascular congestion.    < end of copied text >      ASSESSMENT/PLAN: 	40y Male  PMHx including T2DM, HTN, ESRD (MWF), CHF, COPD on home O2 (3L), bipolar disorder, schizophrenia,depression BIBA presenting after 2 unwitnessed syncopal episodes at home also noted with recent shaking events    - No pertinent events on tele thus far  - Echo once off airborne precautions  - Orthostatic BP  - Consider Neuro Eval ?Seizure  - will follow    I once again thank you for allowing me to participate in the care of your patient.  If you have any questions or concerns please do not hesitate to contact me.    Clifford Parks MD, [facility]  BEEPER [phone]

## 2020-12-17 NOTE — EEG REPORT - NS EEG TEXT BOX
[facility]   COMPREHENSIVE EPILEPSY CENTER   REPORT OF ROUTINE VIDEO EEG     [facility]: [address] [place] [address] , [address], [place] [address], Ph#: [phone]  [facility]: [address], [place] [address], Ph#: [phone]      Patient Name: PRESTON JOHNSON JR  Age and : 40y ([birth year])  MRN #: [id number]  Location: [address]  Referring Physician: Mally Damon    Study Date: 20    _____________________________________________________________  TECHNICAL INFORMATION    Placement and Labeling of Electrodes:  The EEG was performed utilizing 20 channels referential EEG connections (coronal over temporal over parasagittal montage) using all standard 10-20 electrode placements with EKG.  Recording was at a sampling rate of 256 samples per second per channel.  Time synchronized digital video recording was done simultaneously with EEG recording.  A low light infrared camera was used for low light recording.  Eduardo and seizure detection algorithms were utilized.    _____________________________________________________________  HISTORY    Patient is a 40y old  Male who presents with a chief complaint of SYNCOPE (17 Dec 2020 15:34)      PERTINENT MEDICATION:  MEDICATIONS  (STANDING):  amLODIPine   Tablet 5 milliGRAM(s) Oral daily  atorvastatin 40 milliGRAM(s) Oral at bedtime  budesonide 160 MICROgram(s)/formoterol 4.5 MICROgram(s) Inhaler 2 Puff(s) Inhalation two times a day  epoetin cyndie-epbx (RETACRIT) Injectable 4000 Unit(s) IV Push <User Schedule>  gabapentin 300 milliGRAM(s) Oral at bedtime  heparin   Injectable 5000 Unit(s) SubCutaneous every 12 hours  influenza   Vaccine 0.5 milliLiter(s) IntraMuscular once  mirtazapine 15 milliGRAM(s) Oral at bedtime  pantoprazole    Tablet 40 milliGRAM(s) Oral before breakfast  QUEtiapine 50 milliGRAM(s) Oral at bedtime  risperiDONE   Tablet 2 milliGRAM(s) Oral at bedtime  senna 2 Tablet(s) Oral at bedtime  sevelamer carbonate 800 milliGRAM(s) Oral three times a day with meals    _____________________________________________________________  STUDY INTERPRETATION    Findings: The background was continuous, spontaneously variable and reactive. During wakefulness, fragments of posterior dominant rhythm consisted of symmetric, poorly modulated 6 Hz activity, with amplitude to 30 uV, that attenuated to eye opening.  Low amplitude frontal beta was noted in wakefulness.    Background Slowing:  Diffuse theta and polymorphic delta slowing.      Focal Slowing:   None were present.    Sleep Background:  Drowsiness was characterized by fragmentation, attenuation, and slowing of the background activity.    Stage II sleep transients were not recorded.    Other Non-Epileptiform Findings:  None were present.    Interictal Epileptiform Activity:   None were present.    Events:  Clinical events: None recorded.  Seizures: None recorded.    Activation Procedures:   Hyperventilation was not performed.    Photic stimulation was not performed.     Artifacts:  Intermittent myogenic and movement artifacts were noted.    ECG:  The heart rate on single channel ECG was predominantly between 60-70 BPM.    _____________________________________________________________  EEG SUMMARY/CLASSIFICATION    Abnormal EEG in the awake, drowsy states.  - Moderate generalized slowing.    _____________________________________________________________  EEG IMPRESSION/CLINICAL CORRELATE    Abnormal EEG study.  1. Moderate nonspecific diffuse or multifocal cerebral dysfunction.   2. No epileptiform pattern or seizure seen.    _____________________________________________________________        Prelim read     Jac Tuttle MD  Epilepsy Fellow, [facility]      Epilepsy Reading Room Ph# [phone]  Epilepsy Answering Service after 5 pm and before 8:30 am: # [phone] [facility]   COMPREHENSIVE EPILEPSY CENTER   REPORT OF ROUTINE VIDEO EEG     [facility]: [address] [place] [address] , [address], [place] [address], Ph#: [phone]  [facility]: [address], [place] [address], Ph#: [phone]      Patient Name: PRESTON JOHNSON JR  Age and : 40y (82)  MRN #: [id number]  Location: [address]  Referring Physician: Mally Damon    Study Date: 20    _____________________________________________________________  TECHNICAL INFORMATION    Placement and Labeling of Electrodes:  The EEG was performed utilizing 20 channels referential EEG connections (coronal over temporal over parasagittal montage) using all standard 10-20 electrode placements with EKG.  Recording was at a sampling rate of 256 samples per second per channel.  Time synchronized digital video recording was done simultaneously with EEG recording.  A low light infrared camera was used for low light recording.  Eduardo and seizure detection algorithms were utilized.    _____________________________________________________________  HISTORY    Patient is a 40y old  Male who presents with a chief complaint of SYNCOPE (17 Dec 2020 15:34)      PERTINENT MEDICATION:  MEDICATIONS  (STANDING):  amLODIPine   Tablet 5 milliGRAM(s) Oral daily  atorvastatin 40 milliGRAM(s) Oral at bedtime  budesonide 160 MICROgram(s)/formoterol 4.5 MICROgram(s) Inhaler 2 Puff(s) Inhalation two times a day  epoetin cyndie-epbx (RETACRIT) Injectable 4000 Unit(s) IV Push <User Schedule>  gabapentin 300 milliGRAM(s) Oral at bedtime  heparin   Injectable 5000 Unit(s) SubCutaneous every 12 hours  influenza   Vaccine 0.5 milliLiter(s) IntraMuscular once  mirtazapine 15 milliGRAM(s) Oral at bedtime  pantoprazole    Tablet 40 milliGRAM(s) Oral before breakfast  QUEtiapine 50 milliGRAM(s) Oral at bedtime  risperiDONE   Tablet 2 milliGRAM(s) Oral at bedtime  senna 2 Tablet(s) Oral at bedtime  sevelamer carbonate 800 milliGRAM(s) Oral three times a day with meals    _____________________________________________________________  STUDY INTERPRETATION    Findings: The background was continuous, spontaneously variable and reactive. During wakefulness, fragments of posterior dominant rhythm consisted of symmetric, poorly modulated 6 Hz activity, with amplitude to 30 uV, that attenuated to eye opening.  Low amplitude frontal beta was noted in wakefulness.    Background Slowing:  Diffuse theta and polymorphic delta slowing.      Focal Slowing:   None were present.    Sleep Background:  Drowsiness was characterized by fragmentation, attenuation, and slowing of the background activity.    Stage II sleep transients were not recorded.    Other Non-Epileptiform Findings:  None were present.    Interictal Epileptiform Activity:   None were present.    Events:  Clinical events: None recorded.  Seizures: None recorded.    Activation Procedures:   Hyperventilation was not performed.    Photic stimulation was not performed.     Artifacts:  Intermittent myogenic and movement artifacts were noted.    ECG:  The heart rate on single channel ECG was predominantly between 60-70 BPM.    _____________________________________________________________  EEG SUMMARY/CLASSIFICATION    Abnormal EEG in the awake, drowsy states.  - Moderate generalized slowing.    _____________________________________________________________  EEG IMPRESSION/CLINICAL CORRELATE    Abnormal EEG study.  1. Moderate nonspecific diffuse or multifocal cerebral dysfunction.   2. No epileptiform pattern or seizure seen.    _____________________________________________________________      Jac Tuttle MD  Epilepsy Fellow, [facility]    Edy Chávez MD PhD  Director, Epilepsy Division, [facility] and [facility]     Epilepsy Reading Room Ph# [phone]  Epilepsy Answering Service after 5 pm and before 8:30 am: Ph# [phone]

## 2020-12-18 ENCOUNTER — TRANSCRIPTION ENCOUNTER (OUTPATIENT)
Age: 40
End: 2020-12-18

## 2020-12-18 LAB
ALBUMIN SERPL ELPH-MCNC: 3.2 G/DL — LOW (ref 3.5–5)
ALBUMIN SERPL ELPH-MCNC: 3.3 G/DL — LOW (ref 3.5–5)
ALP SERPL-CCNC: 161 U/L — HIGH (ref 40–120)
ALP SERPL-CCNC: 169 U/L — HIGH (ref 40–120)
ALT FLD-CCNC: 15 U/L DA — SIGNIFICANT CHANGE UP (ref 10–60)
ALT FLD-CCNC: 16 U/L DA — SIGNIFICANT CHANGE UP (ref 10–60)
AMMONIA BLD-MCNC: 36 UMOL/L — HIGH (ref 11–32)
ANION GAP SERPL CALC-SCNC: 11 MMOL/L — SIGNIFICANT CHANGE UP (ref 5–17)
ANION GAP SERPL CALC-SCNC: 15 MMOL/L — SIGNIFICANT CHANGE UP (ref 5–17)
AST SERPL-CCNC: 7 U/L — LOW (ref 10–40)
AST SERPL-CCNC: 9 U/L — LOW (ref 10–40)
BILIRUB SERPL-MCNC: 0.4 MG/DL — SIGNIFICANT CHANGE UP (ref 0.2–1.2)
BILIRUB SERPL-MCNC: 0.4 MG/DL — SIGNIFICANT CHANGE UP (ref 0.2–1.2)
BUN SERPL-MCNC: 48 MG/DL — HIGH (ref 7–18)
BUN SERPL-MCNC: 85 MG/DL — HIGH (ref 7–18)
CALCIUM SERPL-MCNC: 10 MG/DL — SIGNIFICANT CHANGE UP (ref 8.4–10.5)
CALCIUM SERPL-MCNC: 9.4 MG/DL — SIGNIFICANT CHANGE UP (ref 8.4–10.5)
CHLORIDE SERPL-SCNC: 90 MMOL/L — LOW (ref 96–108)
CHLORIDE SERPL-SCNC: 93 MMOL/L — LOW (ref 96–108)
CO2 SERPL-SCNC: 25 MMOL/L — SIGNIFICANT CHANGE UP (ref 22–31)
CO2 SERPL-SCNC: 28 MMOL/L — SIGNIFICANT CHANGE UP (ref 22–31)
CREAT SERPL-MCNC: 10.5 MG/DL — HIGH (ref 0.5–1.3)
CREAT SERPL-MCNC: 7.04 MG/DL — HIGH (ref 0.5–1.3)
CULTURE RESULTS: SIGNIFICANT CHANGE UP
GGT SERPL-CCNC: 161 U/L — HIGH (ref 9–50)
GLUCOSE SERPL-MCNC: 114 MG/DL — HIGH (ref 70–99)
GLUCOSE SERPL-MCNC: 143 MG/DL — HIGH (ref 70–99)
HCT VFR BLD CALC: 32.9 % — LOW (ref 39–50)
HGB BLD-MCNC: 10.4 G/DL — LOW (ref 13–17)
MAGNESIUM SERPL-MCNC: 2.2 MG/DL — SIGNIFICANT CHANGE UP (ref 1.6–2.6)
MCHC RBC-ENTMCNC: 29.7 PG — SIGNIFICANT CHANGE UP (ref 27–34)
MCHC RBC-ENTMCNC: 31.6 GM/DL — LOW (ref 32–36)
MCV RBC AUTO: 94 FL — SIGNIFICANT CHANGE UP (ref 80–100)
NRBC # BLD: 0 /100 WBCS — SIGNIFICANT CHANGE UP (ref 0–0)
PHOSPHATE SERPL-MCNC: 8.3 MG/DL — HIGH (ref 2.5–4.5)
PLATELET # BLD AUTO: 233 K/UL — SIGNIFICANT CHANGE UP (ref 150–400)
POTASSIUM SERPL-MCNC: 4.5 MMOL/L — SIGNIFICANT CHANGE UP (ref 3.5–5.3)
POTASSIUM SERPL-MCNC: 4.5 MMOL/L — SIGNIFICANT CHANGE UP (ref 3.5–5.3)
POTASSIUM SERPL-SCNC: 4.5 MMOL/L — SIGNIFICANT CHANGE UP (ref 3.5–5.3)
POTASSIUM SERPL-SCNC: 4.5 MMOL/L — SIGNIFICANT CHANGE UP (ref 3.5–5.3)
PROT SERPL-MCNC: 7.7 G/DL — SIGNIFICANT CHANGE UP (ref 6–8.3)
PROT SERPL-MCNC: 8 G/DL — SIGNIFICANT CHANGE UP (ref 6–8.3)
RBC # BLD: 3.5 M/UL — LOW (ref 4.2–5.8)
RBC # FLD: 14.6 % — HIGH (ref 10.3–14.5)
SODIUM SERPL-SCNC: 130 MMOL/L — LOW (ref 135–145)
SODIUM SERPL-SCNC: 132 MMOL/L — LOW (ref 135–145)
SPECIMEN SOURCE: SIGNIFICANT CHANGE UP
WBC # BLD: 8.13 K/UL — SIGNIFICANT CHANGE UP (ref 3.8–10.5)
WBC # FLD AUTO: 8.13 K/UL — SIGNIFICANT CHANGE UP (ref 3.8–10.5)

## 2020-12-18 PROCEDURE — 99232 SBSQ HOSP IP/OBS MODERATE 35: CPT | Mod: GC

## 2020-12-18 RX ORDER — NIFEDIPINE 30 MG
30 TABLET, EXTENDED RELEASE 24 HR ORAL DAILY
Refills: 0 | Status: DISCONTINUED | OUTPATIENT
Start: 2020-12-19 | End: 2020-12-19

## 2020-12-18 RX ORDER — AMLODIPINE BESYLATE 2.5 MG/1
10 TABLET ORAL DAILY
Refills: 0 | Status: DISCONTINUED | OUTPATIENT
Start: 2020-12-18 | End: 2020-12-18

## 2020-12-18 RX ORDER — METOPROLOL TARTRATE 50 MG
50 TABLET ORAL
Refills: 0 | Status: DISCONTINUED | OUTPATIENT
Start: 2020-12-18 | End: 2020-12-18

## 2020-12-18 RX ORDER — METOPROLOL TARTRATE 50 MG
1 TABLET ORAL
Qty: 0 | Refills: 0 | DISCHARGE
Start: 2020-12-18

## 2020-12-18 RX ORDER — ATORVASTATIN CALCIUM 80 MG/1
1 TABLET, FILM COATED ORAL
Qty: 0 | Refills: 0 | DISCHARGE
Start: 2020-12-18

## 2020-12-18 RX ORDER — HYDRALAZINE HCL 50 MG
5 TABLET ORAL ONCE
Refills: 0 | Status: COMPLETED | OUTPATIENT
Start: 2020-12-18 | End: 2020-12-18

## 2020-12-18 RX ADMIN — AMLODIPINE BESYLATE 5 MILLIGRAM(S): 2.5 TABLET ORAL at 06:22

## 2020-12-18 RX ADMIN — MIRTAZAPINE 15 MILLIGRAM(S): 45 TABLET, ORALLY DISINTEGRATING ORAL at 22:04

## 2020-12-18 RX ADMIN — Medication 50 MILLIGRAM(S): at 17:10

## 2020-12-18 RX ADMIN — HEPARIN SODIUM 5000 UNIT(S): 5000 INJECTION INTRAVENOUS; SUBCUTANEOUS at 06:22

## 2020-12-18 RX ADMIN — RISPERIDONE 2 MILLIGRAM(S): 4 TABLET ORAL at 22:04

## 2020-12-18 RX ADMIN — BUDESONIDE AND FORMOTEROL FUMARATE DIHYDRATE 2 PUFF(S): 160; 4.5 AEROSOL RESPIRATORY (INHALATION) at 22:04

## 2020-12-18 RX ADMIN — SEVELAMER CARBONATE 800 MILLIGRAM(S): 2400 POWDER, FOR SUSPENSION ORAL at 07:58

## 2020-12-18 RX ADMIN — GABAPENTIN 300 MILLIGRAM(S): 400 CAPSULE ORAL at 22:04

## 2020-12-18 RX ADMIN — Medication 5 MILLIGRAM(S): at 18:53

## 2020-12-18 RX ADMIN — HEPARIN SODIUM 5000 UNIT(S): 5000 INJECTION INTRAVENOUS; SUBCUTANEOUS at 17:10

## 2020-12-18 RX ADMIN — SEVELAMER CARBONATE 800 MILLIGRAM(S): 2400 POWDER, FOR SUSPENSION ORAL at 17:10

## 2020-12-18 RX ADMIN — PANTOPRAZOLE SODIUM 40 MILLIGRAM(S): 20 TABLET, DELAYED RELEASE ORAL at 06:22

## 2020-12-18 RX ADMIN — ATORVASTATIN CALCIUM 40 MILLIGRAM(S): 80 TABLET, FILM COATED ORAL at 22:04

## 2020-12-18 RX ADMIN — QUETIAPINE FUMARATE 50 MILLIGRAM(S): 200 TABLET, FILM COATED ORAL at 22:04

## 2020-12-18 RX ADMIN — ERYTHROPOIETIN 4000 UNIT(S): 10000 INJECTION, SOLUTION INTRAVENOUS; SUBCUTANEOUS at 11:54

## 2020-12-18 NOTE — PROGRESS NOTE ADULT - PROBLEM SELECTOR PLAN 4
- h/o bipolar, on risperidone, quetiapine, mirtazapine   - will continue all psych meds at home doses
- h/o bipolar, on risperidone, quetiapine, mirtazapine   - will continue all psych meds at home doses

## 2020-12-18 NOTE — PROGRESS NOTE ADULT - PROBLEM SELECTOR PLAN 8
- morbidly obese   - snores at night  - do not have cpap machine   - nutrition consulted
- morbidly obese   - snores at night  - do not have cpap machine   - nutrition consulted

## 2020-12-18 NOTE — PROGRESS NOTE ADULT - PROBLEM SELECTOR PLAN 9
RISK                                                          Points  [] Previous VTE                                           3  [] Thrombophilia                                        2  [] Lower limb paralysis                              2   [] Current Cancer                                       2   [x] Immobilization > 24 hrs                        1  [] ICU/CCU stay > 24 hours                       1  [x] Age > 60                                                   1    sc heparin as esrd scd boots
RISK                                                          Points  [] Previous VTE                                           3  [] Thrombophilia                                        2  [] Lower limb paralysis                              2   [] Current Cancer                                       2   [x] Immobilization > 24 hrs                        1  [] ICU/CCU stay > 24 hours                       1  [x] Age > 60                                                   1    sc heparin as esrd scd boots

## 2020-12-18 NOTE — PROGRESS NOTE ADULT - PROBLEM SELECTOR PLAN 2
- h/o ESRD on dialysis (MWF)  - AV fistula on left arm  - Dr. Abhinav castano, onboard
- h/o ESRD on dialysis (MWF);   - AV fistula on left arm  - Dr. Abhinav castano, onboard

## 2020-12-18 NOTE — DIETITIAN INITIAL EVALUATION ADULT. - OTHER INFO
visited patient in dialysis, No weight loss PTA reported. reports adequate oral intake in-house. No complaints of Nausea, vomiting or diarrhea. No problems with chewing or swallowing food . Allergies to shellfish and fish, communicated with kitchen. No pressure ulcers. Provide renal replacement diet as ordered. reports following renal dialysis diet PTA and declined additional renal dialysis education. Patient with DM history but A1C: 4.6 , therefore No need for DM restrictions at this time per RN.

## 2020-12-18 NOTE — PROGRESS NOTE ADULT - SUBJECTIVE AND OBJECTIVE BOX
Patient denies chest pain or shortness of breath.   Review of systems otherwise (-)  	  MEDICATIONS:  MEDICATIONS  (STANDING):  amLODIPine   Tablet 5 milliGRAM(s) Oral daily  atorvastatin 40 milliGRAM(s) Oral at bedtime  budesonide 160 MICROgram(s)/formoterol 4.5 MICROgram(s) Inhaler 2 Puff(s) Inhalation two times a day  epoetin cyndie-epbx (RETACRIT) Injectable 4000 Unit(s) IV Push <User Schedule>  gabapentin 300 milliGRAM(s) Oral at bedtime  heparin   Injectable 5000 Unit(s) SubCutaneous every 12 hours  influenza   Vaccine 0.5 milliLiter(s) IntraMuscular once  metoprolol tartrate 50 milliGRAM(s) Oral two times a day  mirtazapine 15 milliGRAM(s) Oral at bedtime  pantoprazole    Tablet 40 milliGRAM(s) Oral before breakfast  QUEtiapine 50 milliGRAM(s) Oral at bedtime  risperiDONE   Tablet 2 milliGRAM(s) Oral at bedtime  senna 2 Tablet(s) Oral at bedtime  sevelamer carbonate 800 milliGRAM(s) Oral three times a day with meals      LABS:	 	    CARDIAC MARKERS:  CARDIAC MARKERS ( 16 Dec 2020 12:26 )  0.083 ng/mL / x     / x     / x     / x      CARDIAC MARKERS ( 16 Dec 2020 05:58 )  0.102 ng/mL / x     / x     / x     / x      CARDIAC MARKERS ( 15 Dec 2020 21:16 )  0.112 ng/mL / x     / x     / x     / x                                10.4   8.13  )-----------( 233      ( 18 Dec 2020 10:53 )             32.9     Hemoglobin: 10.4 g/dL (12-18 @ 10:53)  Hemoglobin: 11.4 g/dL (12-17 @ 09:05)  Hemoglobin: 10.6 g/dL (12-16 @ 05:58)  Hemoglobin: 10.8 g/dL (12-15 @ 21:16)      12-18    130<L>  |  90<L>  |  85<H>  ----------------------------<  143<H>  4.5   |  25  |  10.50<H>    Ca    10.0      18 Dec 2020 10:53  Phos  8.3     12-18  Mg     2.2     12-18    TPro  7.7  /  Alb  3.3<L>  /  TBili  0.4  /  DBili  x   /  AST  7<L>  /  ALT  16  /  AlkPhos  161<H>  12-18    Creatinine Trend: 10.50<--, 8.01<--, 9.25<--, 8.36<--      PHYSICAL EXAM:  T(C): 36.6 (12-18-20 @ 10:40), Max: 36.7 (12-17-20 @ 19:10)  HR: 81 (12-18-20 @ 10:40) (77 - 82)  BP: 155/85 (12-18-20 @ 10:40) (155/85 - 178/95)  RR: 17 (12-18-20 @ 10:40) (17 - 20)  SpO2: 99% (12-18-20 @ 10:40) (99% - 100%)  Wt(kg): --  I&O's Summary    17 Dec 2020 07:01  -  18 Dec 2020 07:00  --------------------------------------------------------  IN: 0 mL / OUT: 200 mL / NET: -200 mL    18 Dec 2020 07:01  -  18 Dec 2020 11:39  --------------------------------------------------------  IN: 250 mL / OUT: 0 mL / NET: 250 mL          Gen: Appears well in NAD  HEENT:  (-)icterus (-)pallor  CV: N S1 S2 1/6 ERNESTINE (+)2 Pulses B/l  Resp:  Clear to ausculatation B/L, normal effort  GI: (+) BS Soft, NT, ND  Lymph:  (-)Edema, (-)obvious lymphadenopathy  Skin: Warm to touch, Normal turgor  Psych: Appropriate mood and affect      TELEMETRY: 	  OFF      ASSESSMENT/PLAN: 	40y Male  PMHx including T2DM, HTN, ESRD (MWF), CHF, COPD on home O2 (3L), bipolar disorder, schizophrenia,depression BIBA presenting after 2 unwitnessed syncopal episodes at home also noted with recent shaking events    - Echo with no pertinent findings  - Orthostatic BP  - Neuro eval noted  - HD per renal    Clifford Parks MD, [facility]  BEEPER [phone]       Patient denies chest pain or shortness of breath.   Review of systems otherwise (-)  	  MEDICATIONS:  MEDICATIONS  (STANDING):  amLODIPine   Tablet 5 milliGRAM(s) Oral daily  atorvastatin 40 milliGRAM(s) Oral at bedtime  budesonide 160 MICROgram(s)/formoterol 4.5 MICROgram(s) Inhaler 2 Puff(s) Inhalation two times a day  epoetin cyndie-epbx (RETACRIT) Injectable 4000 Unit(s) IV Push <User Schedule>  gabapentin 300 milliGRAM(s) Oral at bedtime  heparin   Injectable 5000 Unit(s) SubCutaneous every 12 hours  influenza   Vaccine 0.5 milliLiter(s) IntraMuscular once  metoprolol tartrate 50 milliGRAM(s) Oral two times a day  mirtazapine 15 milliGRAM(s) Oral at bedtime  pantoprazole    Tablet 40 milliGRAM(s) Oral before breakfast  QUEtiapine 50 milliGRAM(s) Oral at bedtime  risperiDONE   Tablet 2 milliGRAM(s) Oral at bedtime  senna 2 Tablet(s) Oral at bedtime  sevelamer carbonate 800 milliGRAM(s) Oral three times a day with meals      LABS:	 	    CARDIAC MARKERS:  CARDIAC MARKERS ( 16 Dec 2020 12:26 )  0.083 ng/mL / x     / x     / x     / x      CARDIAC MARKERS ( 16 Dec 2020 05:58 )  0.102 ng/mL / x     / x     / x     / x      CARDIAC MARKERS ( 15 Dec 2020 21:16 )  0.112 ng/mL / x     / x     / x     / x                                10.4   8.13  )-----------( 233      ( 18 Dec 2020 10:53 )             32.9     Hemoglobin: 10.4 g/dL (12-18 @ 10:53)  Hemoglobin: 11.4 g/dL (12-17 @ 09:05)  Hemoglobin: 10.6 g/dL (12-16 @ 05:58)  Hemoglobin: 10.8 g/dL (12-15 @ 21:16)      12-18    130<L>  |  90<L>  |  85<H>  ----------------------------<  143<H>  4.5   |  25  |  10.50<H>    Ca    10.0      18 Dec 2020 10:53  Phos  8.3     12-18  Mg     2.2     12-18    TPro  7.7  /  Alb  3.3<L>  /  TBili  0.4  /  DBili  x   /  AST  7<L>  /  ALT  16  /  AlkPhos  161<H>  12-18    Creatinine Trend: 10.50<--, 8.01<--, 9.25<--, 8.36<--      PHYSICAL EXAM:  T(C): 36.6 (12-18-20 @ 10:40), Max: 36.7 (12-17-20 @ 19:10)  HR: 81 (12-18-20 @ 10:40) (77 - 82)  BP: 155/85 (12-18-20 @ 10:40) (155/85 - 178/95)  RR: 17 (12-18-20 @ 10:40) (17 - 20)  SpO2: 99% (12-18-20 @ 10:40) (99% - 100%)  Wt(kg): --  I&O's Summary    17 Dec 2020 07:01  -  18 Dec 2020 07:00  --------------------------------------------------------  IN: 0 mL / OUT: 200 mL / NET: -200 mL    18 Dec 2020 07:01  -  18 Dec 2020 11:39  --------------------------------------------------------  IN: 250 mL / OUT: 0 mL / NET: 250 mL          Gen: Appears well in NAD  HEENT:  (-)icterus (-)pallor  CV: N S1 S2 1/6 ERNESTINE (+)2 Pulses B/l  Resp:  Clear to ausculatation B/L, normal effort  GI: (+) BS Soft, NT, ND  Lymph:  (-)Edema, (-)obvious lymphadenopathy  Skin: Warm to touch, Normal turgor  Psych: Appropriate mood and affect      TELEMETRY: 	 sinus      ASSESSMENT/PLAN: 	40y Male  PMHx including T2DM, HTN, ESRD (MWF), CHF, COPD on home O2 (3L), bipolar disorder, schizophrenia,depression BIBA presenting after 2 unwitnessed syncopal episodes at home also noted with recent shaking events    - Echo with no pertinent findings  - Orthostatic BP  - Neuro eval noted  - HD per renal    Clifford Parks MD, [facility]  BEEPER [phone]

## 2020-12-18 NOTE — DIETITIAN INITIAL EVALUATION ADULT. - PROBLEM SELECTOR PLAN 1
- p/w 2 unwitnessed falls ,  feeling shakiness and off balance in knees before episodes  no incontinence of urine /feces, no tongue bite  - EKG: NSR   - Trops 1st 0.112 -most likely from esrd cr 8  - f/u Trops 2nd , 3rd set   - CT head showed no hemorrhage ,   - Tele monitoring   - f/u Orthostatics   - f/u carotid doppler   - f/u Echo  - f/u PT Eval   [facility] ? will need psych eval of meds ?  -F/u EEG to r/o seizure foci  neurologist Eulogio

## 2020-12-18 NOTE — DISCHARGE NOTE PROVIDER - CARE PROVIDER_API CALL
Isac La  INTERNAL MEDICINE  [address] [place], [place] [address]  Phone: [phone]  Fax: [phone]  Follow Up Time: 1 week   Isac La  INTERNAL MEDICINE  [address] [place], [place] [address]  Phone: [phone]  Fax: [phone]  Follow Up Time: 1 week    [place] [address]  Phone: [phone]  Fax: [phone]  Follow Up Time: 1 week

## 2020-12-18 NOTE — DIETITIAN INITIAL EVALUATION ADULT. - PERTINENT LABORATORY DATA
12-18 Na130 mmol/L<L> Glu 143 mg/dL<H> K+ 4.5 mmol/L Cr  10.50 mg/dL<H> BUN 85 mg/dL<H> 12-18 Phos 8.3 mg/dL<H> 12-18 Alb 3.3 g/dL<L> 12-16 Chol 141 mg/dL LDL --    HDL 63 mg/dL Trig 56 mg/dL

## 2020-12-18 NOTE — PROGRESS NOTE ADULT - PROBLEM SELECTOR PLAN 5
- h/o DM, not on any meds at home   - a1c 4.6  - hold off sliding scale for now in setting of esrd cr 8  - acuchecks q6
- h/o DM, not on any meds at home   - a1c 4.6  - hold off sliding scale for now in setting of esrd cr 8  - acuchecks q6

## 2020-12-18 NOTE — PROGRESS NOTE ADULT - PROBLEM SELECTOR PLAN 6
- h/o asthma and COPD, on no meds, but on 3L of oxygen at home   - c/w inhalers prn  - monitor O2 sat
- h/o asthma and COPD, on no meds, but on 3L of oxygen at home   - c/w inhalers prn  - monitor O2 sat

## 2020-12-18 NOTE — PROGRESS NOTE ADULT - SUBJECTIVE AND OBJECTIVE BOX
Nephrology Followup Note - [phone] - Dr Villalobos / Dr Sanchez / Dr Barber / Dr Mehta / Dr Mauricio / Dr Alexander / Dr Hui / Dr La  Pt seen and examined at bedside  No acute events overnight. No complaints.     Allergies:  aspirin (Swelling)  fish (Unknown)  penicillin (Rash)  penicillins (Swelling)  shellfish (Rash)  shellfish (Unknown)    Hospital Medications:   MEDICATIONS  (STANDING):  amLODIPine   Tablet 5 milliGRAM(s) Oral daily  atorvastatin 40 milliGRAM(s) Oral at bedtime  budesonide 160 MICROgram(s)/formoterol 4.5 MICROgram(s) Inhaler 2 Puff(s) Inhalation two times a day  epoetin cyndie-epbx (RETACRIT) Injectable 4000 Unit(s) IV Push <User Schedule>  gabapentin 300 milliGRAM(s) Oral at bedtime  heparin   Injectable 5000 Unit(s) SubCutaneous every 12 hours  influenza   Vaccine 0.5 milliLiter(s) IntraMuscular once  metoprolol tartrate 50 milliGRAM(s) Oral two times a day  mirtazapine 15 milliGRAM(s) Oral at bedtime  pantoprazole    Tablet 40 milliGRAM(s) Oral before breakfast  QUEtiapine 50 milliGRAM(s) Oral at bedtime  risperiDONE   Tablet 2 milliGRAM(s) Oral at bedtime  senna 2 Tablet(s) Oral at bedtime  sevelamer carbonate 800 milliGRAM(s) Oral three times a day with meals    REVIEW OF SYSTEMS:  CONSTITUTIONAL: No weakness, fevers or chills  EYES/ENT: No visual changes;  No vertigo or throat pain   NECK: No pain or stiffness  RESPIRATORY: No cough, wheezing, hemoptysis; No shortness of breath  CARDIOVASCULAR: No chest pain or palpitations.  GASTROINTESTINAL: No abdominal or epigastric pain. No nausea, vomiting, or hematemesis; No diarrhea or constipation. No melena or hematochezia.  GENITOURINARY: No dysuria, frequency, foamy urine, urinary urgency, incontinence or hematuria  NEUROLOGICAL: No numbness or weakness  SKIN: No itching, burning, rashes, or lesions   VASCULAR: No bilateral lower extremity edema.   All other review of systems is negative unless indicated above.    VITALS:  T(F): 97.9 (20 @ 10:40), Max: 98 (20 @ 19:10)  HR: 81 (20 @ 10:40)  BP: 155/85 (20 @ 10:40)  RR: 17 (20 @ 10:40)  SpO2: 99% (20 @ 10:40)  Wt(kg): --     @ 07:01  -   @ 07:00  --------------------------------------------------------  IN: 0 mL / OUT: 200 mL / NET: -200 mL     @ 07:01  -   @ 15:51  --------------------------------------------------------  IN: 250 mL / OUT: 0 mL / NET: 250 mL        PHYSICAL EXAM:  Constitutional: NAD  HEENT: anicteric sclera, oropharynx clear, MMM  Neck: No JVD  Respiratory: CTAB, no wheezes, rales or rhonchi  Cardiovascular: S1, S2, RRR  Gastrointestinal: BS+, soft, NT/ND  Extremities: No cyanosis or clubbing. No peripheral edema  Neurological: A/O x 3, no focal deficits  Psychiatric: Normal mood, normal affect  : No CVA tenderness. No hernandez.   Skin: No rashes  Vascular Access: LUE AV access +thrill and bruit.     LABS:      130<L>  |  90<L>  |  85<H>  ----------------------------<  143<H>  4.5   |  25  |  10.50<H>    Ca    10.0      18 Dec 2020 10:53  Phos  8.3       Mg     2.2         TPro  7.7  /  Alb  3.3<L>  /  TBili  0.4  /  DBili      /  AST  7<L>  /  ALT  16  /  AlkPhos  161<H>      Creatinine Trend: 10.50 <--, 8.01 <--, 9.25 <--, 8.36 <--                        10.4   8.13  )-----------( 233      ( 18 Dec 2020 10:53 )             32.9     Urine Studies:  Urinalysis Basic - ( 17 Dec 2020 07:10 )    Color: Yellow / Appearance: Clear / S.005 / pH:   Gluc:  / Ketone: Negative  / Bili: Negative / Urobili: Negative   Blood:  / Protein: 500 mg/dL / Nitrite: Negative   Leuk Esterase: Trace / RBC: 2-5 /HPF / WBC 3-5 /HPF   Sq Epi:  / Non Sq Epi: Moderate /HPF / Bacteria: Few /HPF        RADIOLOGY & ADDITIONAL STUDIES:

## 2020-12-18 NOTE — DISCHARGE NOTE PROVIDER - NSDCCPCAREPLAN_GEN_ALL_CORE_FT
PRINCIPAL DISCHARGE DIAGNOSIS  Diagnosis: Syncope  Assessment and Plan of Treatment: You presented with syncope, likely secondary to tremor that lead to fall  - please follow up with your nephrologist and psychiatrist with 1 week.  - visit your nearest ER if you have another episode of fall, but refrain from ambulating without assistance until your medications have been optimized      SECONDARY DISCHARGE DIAGNOSES  Diagnosis: Asthma with COPD  Assessment and Plan of Treatment: You have a history of asthma with COPD  - please continue to use oxygen at home  - follow up with your primary care physician within 1-2 weeks    Diagnosis: Bipolar disorder  Assessment and Plan of Treatment: You have a history of bipolar disorder  - please continue to take your prescribed medications, and follow up with your primary psychiatrist for    Diagnosis: Schizophrenia  Assessment and Plan of Treatment: Schizophrenia    Diagnosis: Hypertension  Assessment and Plan of Treatment: You have a history of hypertension  - Please continue current medication regimen, and follow up with your PCP   - You should continue on the current antihypertensive regimen regularly.  - You blood pressure should be within 140-120/80-90.  - You should follow-up with your PCP within 1 week of your discharge for routine blood pressure monitoring at your next visit.  - Notify your doctor if you have any of the following symptoms:   (Dizziness, Lightheadedness, Blurry vision, Headache, Chest pain, Shortness of breath.)  - You should maintain healthy lifestyle by eating healthy low salt diet, avoid fatty food, weight loss, exercise regularly as tolerated 30 mins X 3 time per week.       PRINCIPAL DISCHARGE DIAGNOSIS  Diagnosis: Syncope  Assessment and Plan of Treatment: You presented with syncope, likely secondary to tremor that lead to fall  - please follow up with your nephrologist and psychiatrist with 1 week.  - visit your nearest ER if you have another episode of fall, but refrain from ambulating without assistance until your medications have been optimized      SECONDARY DISCHARGE DIAGNOSES  Diagnosis: Schizophrenia  Assessment and Plan of Treatment: You have history of schizophrenia. Please continue to take your medications as prescribed. Please follow up with your outpatient psychiatrist Dr. Alvarado for further management of your schizophrenia.    Diagnosis: Bipolar disorder  Assessment and Plan of Treatment: You have a history of bipolar disorder  - please continue to take your prescribed medications, and follow up with your primary psychiatrist for further management of your bipolar disorder.    Diagnosis: Asthma with COPD  Assessment and Plan of Treatment: You have a history of asthma with COPD  - please continue to use oxygen at home  - follow up with your primary care physician within 1-2 weeks    Diagnosis: Hypertension  Assessment and Plan of Treatment: You have a history of hypertension  - Please continue current medication regimen, and follow up with your PCP   - You should continue on the current antihypertensive regimen regularly.  - You blood pressure should be within 140-120/80-90.  - You should follow-up with your PCP within 1 week of your discharge for routine blood pressure monitoring at your next visit.  - Notify your doctor if you have any of the following symptoms:   (Dizziness, Lightheadedness, Blurry vision, Headache, Chest pain, Shortness of breath.)  - You should maintain healthy lifestyle by eating healthy low salt diet, avoid fatty food, weight loss, exercise regularly as tolerated 30 mins X 3 time per week.  WE HAVE ADDED A NEW MEDICATION called NIFEDIPINE DURING YOUR HOSPITAL STAY for better control of your blood pressure. This medication is taken once daily. Please follow up with your PCP and nephrologist Dr. La for further management of your hypertension.

## 2020-12-18 NOTE — DISCHARGE NOTE PROVIDER - NSDCFUSCHEDAPPT_GEN_ALL_CORE_FT
PRESTON JOHNSON JR ; 01/29/2021 ; [facility] [address]  PRESTON JOHNSON JR ; 01/29/2021 ; [facility] [address]

## 2020-12-18 NOTE — PROGRESS NOTE ADULT - SUBJECTIVE AND OBJECTIVE BOX
PGY-1 Progress Note discussed with attending    PAGER #: [[phone]] TILL 5:00 PM  PLEASE CONTACT ON CALL TEAM:  - On Call Team (Please refer to Janine) FROM 5:00 PM - 8:30PM  - Nightfloat Team FROM 8:30 -7:30 AM    INTERVAL HPI/OVERNIGHT EVENTS:   - No acute events overnight. Patient tolerated HD well, but post-HD 's. Patient is otherwise stable. Ted fever, chills, dizziness, weakness, headache, chest pain, sob (on 2L oxygen), abdominal pain, urinary or bowel movement changes.    REVIEW OF SYSTEMS:  CONSTITUTIONAL: No fever, weight loss, or fatigue  RESPIRATORY: No cough, wheezing, chills or hemoptysis; No shortness of breath  CARDIOVASCULAR: No chest pain, palpitations, dizziness, or leg swelling  GASTROINTESTINAL: No abdominal pain. No nausea, vomiting, or hematemesis; No diarrhea or constipation. No melena or hematochezia.  GENITOURINARY: No dysuria or hematuria, urinary frequency  NEUROLOGICAL: No headaches, memory loss, loss of strength, numbness, or tremors  SKIN: No itching, burning, rashes, or lesions     MEDICATIONS  (STANDING):  amLODIPine   Tablet 5 milliGRAM(s) Oral daily  atorvastatin 40 milliGRAM(s) Oral at bedtime  budesonide 160 MICROgram(s)/formoterol 4.5 MICROgram(s) Inhaler 2 Puff(s) Inhalation two times a day  epoetin cyndie-epbx (RETACRIT) Injectable 4000 Unit(s) IV Push <User Schedule>  gabapentin 300 milliGRAM(s) Oral at bedtime  heparin   Injectable 5000 Unit(s) SubCutaneous every 12 hours  influenza   Vaccine 0.5 milliLiter(s) IntraMuscular once  metoprolol tartrate 50 milliGRAM(s) Oral two times a day  mirtazapine 15 milliGRAM(s) Oral at bedtime  pantoprazole    Tablet 40 milliGRAM(s) Oral before breakfast  QUEtiapine 50 milliGRAM(s) Oral at bedtime  risperiDONE   Tablet 2 milliGRAM(s) Oral at bedtime  senna 2 Tablet(s) Oral at bedtime  sevelamer carbonate 800 milliGRAM(s) Oral three times a day with meals    MEDICATIONS  (PRN):  ALBUTerol    90 MICROgram(s) HFA Inhaler 2 Puff(s) Inhalation every 6 hours PRN Shortness of Breath and/or Wheezing      Vital Signs Last 24 Hrs  T(C): 36.9 (18 Dec 2020 15:30), Max: 36.9 (18 Dec 2020 15:30)  T(F): 98.4 (18 Dec 2020 15:30), Max: 98.4 (18 Dec 2020 15:30)  HR: 79 (18 Dec 2020 15:30) (75 - 82)  BP: 173/94 (18 Dec 2020 15:30) (126/80 - 178/95)  BP(mean): --  RR: 17 (18 Dec 2020 15:30) (16 - 19)  SpO2: 100% (18 Dec 2020 15:30) (99% - 100%)    PHYSICAL EXAMINATION:  GENERAL: NAD, AAOx3  HEAD: AT/NC  EYES: conjunctiva and sclera clear  NECK: supple, No JVD noted, Normal thyroid  CHEST/LUNG: CTABL; no rales, rhonchi, wheezing, or rubs  HEART: regular rate and rhythm; no murmurs, rubs, or gallops  ABDOMEN: soft, nontender, nondistended; Bowel sounds present  EXTREMITIES:  2+ Peripheral Pulses, No clubbing, cyanosis, or edema  SKIN: warm dry                          10.4   8.13  )-----------( 233      ( 18 Dec 2020 10:53 )             32.9     12-18    130<L>  |  90<L>  |  85<H>  ----------------------------<  143<H>  4.5   |  25  |  10.50<H>    Ca    10.0      18 Dec 2020 10:53  Phos  8.3     12-18  Mg     2.2     12-18    TPro  7.7  /  Alb  3.3<L>  /  TBili  0.4  /  DBili  x   /  AST  7<L>  /  ALT  16  /  AlkPhos  161<H>  12-18    LIVER FUNCTIONS - ( 18 Dec 2020 10:53 )  Alb: 3.3 g/dL / Pro: 7.7 g/dL / ALK PHOS: 161 U/L / ALT: 16 U/L DA / AST: 7 U/L / GGT: x                 COVID-19 PCR: NotDetec (16 Dec 2020 00:53)      CAPILLARY BLOOD GLUCOSE          RADIOLOGY & ADDITIONAL TESTS:

## 2020-12-18 NOTE — DISCHARGE NOTE PROVIDER - HOSPITAL COURSE
Patient is a 40 year old Male from home lives with mother, extensive PMHx including T2DM, HTN, ESRD (MWF), CHF, COPD on home O2 (3L), bipolar disorder, schizophrenia,depression BIBA presenting after 2 unwitnessed syncopal episodes at home. Pt reports that around 3 pm today, he was feeling his usual shakiness ( started 2 weeks ago, goes away with cluching hands )  while getting a drink and woke up on the ground. His estimated down time was about 30 minutes. The patient then reports that he went to his room, walked back into the kitchen to get a drink, relaxed in his room, and then had another syncopal episode when he went to put the cup in the sink. The patient reports remembering his legs giving out from under him ( knees) and then falling backwards. The patient estimates down time to be about 45 minutes. The patient reports he has been having shakiness since 2 weeks. The patient endorses left knee pain, but denies any back pain. The patient reports posterior headache where he thinks he may have hit his head, but denies visual changes, numbness, tingling, weakness. The patient is on antipsychotic meds only. The patient reports shortness of breath, but states that this is chronic given his COPD take sno meds for other medical problems. The patient denies fever, chills, chest pain, palpitations, abdominal pain, nausea, vomiting, changes in bowel movement, changes in urination. Pt denies any dizziness , aura before fall, ringing in ear, ear fullness, migrain hx. Pt donot know if there was any recent medication change as his mother take care of all his meds. CT head and CTA Patient is a 40 year old Male from home lives with mother, extensive PMHx including T2DM, HTN, ESRD (MWF), CHF, COPD on home O2 (3L), bipolar disorder, schizophrenia,depression BIBA presenting after 2 unwitnessed syncopal episodes at home. Pt reports that around 3 pm today, he was feeling his usual shakiness ( started 2 weeks ago, goes away with cluching hands )  while getting a drink and woke up on the ground. His estimated down time was about 30 minutes. The patient then reports that he went to his room, walked back into the kitchen to get a drink, relaxed in his room, and then had another syncopal episode when he went to put the cup in the sink. The patient reports remembering his legs giving out from under him ( knees) and then falling backwards. The patient estimates down time to be about 45 minutes. The patient reports he has been having shakiness since 2 weeks. The patient endorses left knee pain, but denies any back pain. The patient reports posterior headache where he thinks he may have hit his head, but denies visual changes, numbness, tingling, weakness. The patient is on antipsychotic meds only. The patient reports shortness of breath, but states that this is chronic given his COPD take sno meds for other medical problems. The patient denies fever, chills, chest pain, palpitations, abdominal pain, nausea, vomiting, changes in bowel movement, changes in urination. Pt denies any dizziness , aura before fall, ringing in ear, ear fullness, migrain hx. Pt donot know if there was any recent medication change as his mother take care of all his meds. CT head, carotid artery doppler, and echo were unremarkable. EEG showed moderate nonspecific diffuse or multifocal cerebral dysfunction, consistent with kidney disease. Patient likely fall due to tremor secondary to end-stage renal disease and/or antipsychotic diseases. Patient is advise to follow up with his psychiatrist with in 1 week to optimize his home medications.     Has a history of schizophrenia and Bipolar disorder on Risperidone, quetiapine, Mirtazapine. Home medication was continued during the admission. Patient is recommended to follow up with primary psychiatrist for optimization.    Has a history of asthma with COPD, on no medications at home, but on 3L of oxygen at home. Patient was cared with oxygen during the admission    Has a history of hypertension, no home medications. Patient is treated with amlodipine 5mg and metoprolol 50mg bid.      Patient is a 40 year old Male from home lives with mother, extensive PMHx including T2DM, HTN, ESRD (MWF), CHF, COPD on home O2 (3L), bipolar disorder, schizophrenia,depression BIBA presenting after 2 unwitnessed syncopal episodes at home. Pt reports that around 3 pm on admission day, he was feeling his usual shakiness ( started 2 weeks ago, goes away with cluching hands )  while getting a drink and woke up on the ground. His estimated down time was about 30 minutes. The patient then reports that he went to his room, walked back into the kitchen to get a drink, relaxed in his room, and then had another syncopal episode when he went to put the cup in the sink. The patient reports remembering his legs giving out from under him ( knees) and then falling backwards. The patient estimates down time to be about 45 minutes. The patient reports he has been having shakiness since 2 weeks. The patient endorses left knee pain, but denies any back pain. The patient reports posterior headache where he thinks he may have hit his head, but denies visual changes, numbness, tingling, weakness. The patient is on antipsychotic meds only. The patient reports shortness of breath, but states that this is chronic given his COPD take sno meds for other medical problems. The patient denies fever, chills, chest pain, palpitations, abdominal pain, nausea, vomiting, changes in bowel movement, changes in urination. Pt denies any dizziness , aura before fall, ringing in ear, ear fullness, migrain hx. Pt donot know if there was any recent medication change as his mother take care of all his meds. CT head, carotid artery doppler, and echo were unremarkable. EEG showed moderate nonspecific diffuse or multifocal cerebral dysfunction, consistent with kidney disease. Patient likely fall due to tremor secondary to end-stage renal disease and/or antipsychotic diseases. Patient is advise to follow up with his psychiatrist with in 1 week to optimize his home medications.     Has a history of schizophrenia and Bipolar disorder on Risperidone, quetiapine, Mirtazapine. Home medication was continued during the admission. Patient is recommended to follow up with primary psychiatrist for optimization.    Has a history of asthma with COPD, on no medications at home, but on 3L of oxygen at home. Patient was cared with oxygen during the admission    Has a history of hypertension, no home medications. Patient is treated with amlodipine 5mg and metoprolol 50mg bid.     Patient is stable for discharge per attending and is advised to follow up with PCP as outpatient  Please refer to patient's complete medical chart with documents for a full hospital course, for this is only a brief summary.

## 2020-12-18 NOTE — PROGRESS NOTE ADULT - PROBLEM SELECTOR PLAN 1
- p/w 2 unwitnessed falls,  feeling shakiness and off balance in knees before episodes  - likely 2/2 psych meds  - no incontinence of urine /feces, no tongue bite  - orthostatic BP neg  - EKG: NSR   - Trop 0.112 >0.102> 0.083  - CT head unremarkable  - carotid doppler neg   - EEG consistent with chronic kidney disease patients  - echo 55-60%  - Tele monitoring      - neuro, Dr. Ramos, onboard
- p/w 2 unwitnessed falls,  feeling shakiness and off balance in knees before episodes  - likely 2/2 psych meds  - no incontinence of urine /feces, no tongue bite  - orthostatic BP neg  - EKG: NSR   - Trop 0.112 >0.102> 0.083  - CT head unremarkable  - carotid doppler neg   - Tele monitoring    - f/u Echo, PT Eval, EEG to r/o seizure foci    - neuro, Dr. Ramos, onboard

## 2020-12-18 NOTE — DIETITIAN INITIAL EVALUATION ADULT. - PROBLEM SELECTOR PLAN 5
not on any meds at home   will hold off sliding scale for now in setting of esrd cr 8  acuchecks q6  f/u hba1c

## 2020-12-18 NOTE — PROGRESS NOTE ADULT - PROBLEM SELECTOR PLAN 3
- hx of scizophrenia, on risperidone, quetiapine, mirtazapine   - will continue all psych meds at home doses  - recommended to f/u w/ primary psych as outpatient within a week
- hx of scizophrenia, on risperidone, quetiapine, mirtazapine   - will continue all psych meds at home doses  - psych onboard for possible etiology of tremor

## 2020-12-18 NOTE — DISCHARGE NOTE PROVIDER - ATTENDING COMMENTS
Patient is a 40 year old Male from home lives with mother, extensive PMHx including T2DM, HTN, ESRD (MWF), CHF, COPD on home O2 (3L), bipolar disorder  schizophrenia, depression is evaluated in hospital due to unwitnessed syncopal episodes.   Hx is unrealiable as pt also has hx of chronic tremors at baseline.   Pt was evaluated by neuro and also had EEG which did not show any seizure like activity and no further neurological work up was recommended.   The patient denies fever, chills, chest pain, palpitations, abdominal pain, nausea, vomiting, changes in bowel movement, changes in urination.  Pt denies any dizziness , aura before fall, ringing in ear, ear fullness, migrain hx. Pt donot know if there was any recent medication change as his   mother take care of all his meds. CT head, carotid artery doppler, and echo were unremarkable. EEG showed moderate nonspecific diffuse or multifocal   cerebral dysfunction, consistent with kidney disease. Patient likely fall due to tremor secondary to end-stage renal disease and/or antipsychotic diseases. Patient is advise to follow up with his psychiatrist with in 1 week to optimize his home medications.

## 2020-12-18 NOTE — PROGRESS NOTE ADULT - PROBLEM SELECTOR PLAN 7
- h/o HTN, on no meds at home   - c/w amlodipine 5mg & metoprolol 50mg bid
- h/o HTN, on no meds at home   - c/w amlodipine 5mg for now

## 2020-12-18 NOTE — DISCHARGE NOTE PROVIDER - PROVIDER TOKENS
PROVIDER:[TOKEN:[[id number]],FOLLOWUP:[1 week]] PROVIDER:[TOKEN:[[id number]],FOLLOWUP:[1 week]],PROVIDER:[TOKEN:[[id number]],FOLLOWUP:[1 week]]

## 2020-12-18 NOTE — DISCHARGE NOTE PROVIDER - NSDCMRMEDTOKEN_GEN_ALL_CORE_FT
gabapentin 300 mg oral capsule: 1 cap(s) orally once a day (at bedtime)  mirtazapine 15 mg oral tablet: 1 tab(s) orally once a day (at bedtime)  QUEtiapine 50 mg oral tablet, extended release: 1 tab(s) orally once a day (in the evening)  risperiDONE 1 mg oral tablet: 2 tab(s) orally once a dayat bed time  sevelamer carbonate 800 mg oral tablet: 1 tab(s) orally 3 times a day (with meals)   atorvastatin 40 mg oral tablet: 1 tab(s) orally once a day (at bedtime)  gabapentin 300 mg oral capsule: 1 cap(s) orally once a day (at bedtime)  metoprolol tartrate 50 mg oral tablet: 1 tab(s) orally 2 times a day  mirtazapine 15 mg oral tablet: 1 tab(s) orally once a day (at bedtime)  QUEtiapine 50 mg oral tablet, extended release: 1 tab(s) orally once a day (in the evening)  risperiDONE 1 mg oral tablet: 2 tab(s) orally once a dayat bed time  sevelamer carbonate 800 mg oral tablet: 1 tab(s) orally 3 times a day (with meals)   atorvastatin 40 mg oral tablet: 1 tab(s) orally once a day (at bedtime)  gabapentin 300 mg oral capsule: 1 cap(s) orally once a day (at bedtime)  metoprolol tartrate 50 mg oral tablet: 1 tab(s) orally 2 times a day  mirtazapine 15 mg oral tablet: 1 tab(s) orally once a day (at bedtime)  Procardia XL 30 mg oral tablet, extended release: 1 tab(s) orally once a day  QUEtiapine 50 mg oral tablet, extended release: 1 tab(s) orally once a day (in the evening)  risperiDONE 1 mg oral tablet: 2 tab(s) orally once a dayat bed time  sevelamer carbonate 800 mg oral tablet: 1 tab(s) orally 3 times a day (with meals)

## 2020-12-19 ENCOUNTER — TRANSCRIPTION ENCOUNTER (OUTPATIENT)
Age: 40
End: 2020-12-19

## 2020-12-19 VITALS
TEMPERATURE: 98 F | DIASTOLIC BLOOD PRESSURE: 88 MMHG | RESPIRATION RATE: 19 BRPM | SYSTOLIC BLOOD PRESSURE: 156 MMHG | HEART RATE: 78 BPM | OXYGEN SATURATION: 96 %

## 2020-12-19 LAB
ALBUMIN SERPL ELPH-MCNC: 3 G/DL — LOW (ref 3.5–5)
ALP SERPL-CCNC: 157 U/L — HIGH (ref 40–120)
ALT FLD-CCNC: 13 U/L DA — SIGNIFICANT CHANGE UP (ref 10–60)
ANION GAP SERPL CALC-SCNC: 15 MMOL/L — SIGNIFICANT CHANGE UP (ref 5–17)
AST SERPL-CCNC: 6 U/L — LOW (ref 10–40)
BILIRUB SERPL-MCNC: 0.4 MG/DL — SIGNIFICANT CHANGE UP (ref 0.2–1.2)
BUN SERPL-MCNC: 61 MG/DL — HIGH (ref 7–18)
CALCIUM SERPL-MCNC: 10.5 MG/DL — SIGNIFICANT CHANGE UP (ref 8.4–10.5)
CHLORIDE SERPL-SCNC: 91 MMOL/L — LOW (ref 96–108)
CO2 SERPL-SCNC: 25 MMOL/L — SIGNIFICANT CHANGE UP (ref 22–31)
CREAT SERPL-MCNC: 8.54 MG/DL — HIGH (ref 0.5–1.3)
GLUCOSE SERPL-MCNC: 85 MG/DL — SIGNIFICANT CHANGE UP (ref 70–99)
HCT VFR BLD CALC: 33.2 % — LOW (ref 39–50)
HGB BLD-MCNC: 10.5 G/DL — LOW (ref 13–17)
MAGNESIUM SERPL-MCNC: 2.3 MG/DL — SIGNIFICANT CHANGE UP (ref 1.6–2.6)
MCHC RBC-ENTMCNC: 29.7 PG — SIGNIFICANT CHANGE UP (ref 27–34)
MCHC RBC-ENTMCNC: 31.6 GM/DL — LOW (ref 32–36)
MCV RBC AUTO: 93.8 FL — SIGNIFICANT CHANGE UP (ref 80–100)
NRBC # BLD: 0 /100 WBCS — SIGNIFICANT CHANGE UP (ref 0–0)
PHOSPHATE SERPL-MCNC: 7.2 MG/DL — HIGH (ref 2.5–4.5)
PLATELET # BLD AUTO: 254 K/UL — SIGNIFICANT CHANGE UP (ref 150–400)
POTASSIUM SERPL-MCNC: 5 MMOL/L — SIGNIFICANT CHANGE UP (ref 3.5–5.3)
POTASSIUM SERPL-SCNC: 5 MMOL/L — SIGNIFICANT CHANGE UP (ref 3.5–5.3)
PROT SERPL-MCNC: 7.5 G/DL — SIGNIFICANT CHANGE UP (ref 6–8.3)
RBC # BLD: 3.54 M/UL — LOW (ref 4.2–5.8)
RBC # FLD: 14.5 % — SIGNIFICANT CHANGE UP (ref 10.3–14.5)
SODIUM SERPL-SCNC: 131 MMOL/L — LOW (ref 135–145)
WBC # BLD: 7.29 K/UL — SIGNIFICANT CHANGE UP (ref 3.8–10.5)
WBC # FLD AUTO: 7.29 K/UL — SIGNIFICANT CHANGE UP (ref 3.8–10.5)

## 2020-12-19 PROCEDURE — 99238 HOSP IP/OBS DSCHRG MGMT 30/<: CPT

## 2020-12-19 RX ORDER — NIFEDIPINE 30 MG
1 TABLET, EXTENDED RELEASE 24 HR ORAL
Qty: 21 | Refills: 0
Start: 2020-12-19 | End: 2021-01-08

## 2020-12-19 RX ADMIN — HEPARIN SODIUM 5000 UNIT(S): 5000 INJECTION INTRAVENOUS; SUBCUTANEOUS at 06:11

## 2020-12-19 RX ADMIN — INFLUENZA VIRUS VACCINE 0.5 MILLILITER(S): 15; 15; 15; 15 SUSPENSION INTRAMUSCULAR at 12:14

## 2020-12-19 RX ADMIN — SEVELAMER CARBONATE 800 MILLIGRAM(S): 2400 POWDER, FOR SUSPENSION ORAL at 12:06

## 2020-12-19 RX ADMIN — SEVELAMER CARBONATE 800 MILLIGRAM(S): 2400 POWDER, FOR SUSPENSION ORAL at 08:25

## 2020-12-19 RX ADMIN — Medication 30 MILLIGRAM(S): at 06:11

## 2020-12-19 RX ADMIN — BUDESONIDE AND FORMOTEROL FUMARATE DIHYDRATE 2 PUFF(S): 160; 4.5 AEROSOL RESPIRATORY (INHALATION) at 10:01

## 2020-12-19 RX ADMIN — PANTOPRAZOLE SODIUM 40 MILLIGRAM(S): 20 TABLET, DELAYED RELEASE ORAL at 06:11

## 2020-12-19 NOTE — PROGRESS NOTE ADULT - SUBJECTIVE AND OBJECTIVE BOX
Nephrology Followup Note - [phone] - Dr Villalobos / Dr Sanchez / Dr Barber / Dr Mehta / Dr Mauricio / Dr Alexander / Dr Hui / Dr La  Pt seen and examined at bedside  No acute events overnight. No complaints.     Allergies:  aspirin (Swelling)  fish (Unknown)  penicillin (Rash)  penicillins (Swelling)  shellfish (Rash)  shellfish (Unknown)    Hospital Medications:   MEDICATIONS  (STANDING):  atorvastatin 40 milliGRAM(s) Oral at bedtime  budesonide 160 MICROgram(s)/formoterol 4.5 MICROgram(s) Inhaler 2 Puff(s) Inhalation two times a day  epoetin cyndie-epbx (RETACRIT) Injectable 4000 Unit(s) IV Push <User Schedule>  gabapentin 300 milliGRAM(s) Oral at bedtime  heparin   Injectable 5000 Unit(s) SubCutaneous every 12 hours  influenza   Vaccine 0.5 milliLiter(s) IntraMuscular once  mirtazapine 15 milliGRAM(s) Oral at bedtime  NIFEdipine XL 30 milliGRAM(s) Oral daily  pantoprazole    Tablet 40 milliGRAM(s) Oral before breakfast  QUEtiapine 50 milliGRAM(s) Oral at bedtime  risperiDONE   Tablet 2 milliGRAM(s) Oral at bedtime  senna 2 Tablet(s) Oral at bedtime  sevelamer carbonate 800 milliGRAM(s) Oral three times a day with meals      VITALS:  T(F): 98.1 (20 @ 11:29), Max: 98.8 (20 @ 19:25)  HR: 78 (20 @ 11:29)  BP: 156/88 (20 @ 11:29)  RR: 19 (20 @ 11:29)  SpO2: 96% (20 @ 11:29)  Wt(kg): --     @ 07:01  -   @ 07:00  --------------------------------------------------------  IN: 1000 mL / OUT: 4600 mL / NET: -3600 mL        PHYSICAL EXAM:  Constitutional: NAD  HEENT: anicteric sclera, oropharynx clear, MMM  Neck: No JVD  Respiratory: CTAB, no wheezes, rales or rhonchi  Cardiovascular: S1, S2, RRR  Gastrointestinal: BS+, soft, NT/ND  Extremities: No cyanosis or clubbing. No peripheral edema  Neurological: A/O x 3, no focal deficits  Psychiatric: Normal mood, normal affect  : No CVA tenderness. No hernandez.   Skin: No rashes  Vascular Access: LUE AV access +thrill and bruit.     LABS:      131<L>  |  91<L>  |  61<H>  ----------------------------<  85  5.0   |  25  |  8.54<H>    Ca    10.5      19 Dec 2020 08:02  Phos  7.2       Mg     2.3         TPro  7.5  /  Alb  3.0<L>  /  TBili  0.4  /  DBili      /  AST  6<L>  /  ALT  13  /  AlkPhos  157<H>      Creatinine Trend: 8.54 <--, 7.04 <--, 10.50 <--, 8.01 <--, 9.25 <--, 8.36 <--                        10.5   7.29  )-----------( 254      ( 19 Dec 2020 08:02 )             33.2     Urine Studies:  Urinalysis Basic - ( 17 Dec 2020 07:10 )    Color: Yellow / Appearance: Clear / S.005 / pH:   Gluc:  / Ketone: Negative  / Bili: Negative / Urobili: Negative   Blood:  / Protein: 500 mg/dL / Nitrite: Negative   Leuk Esterase: Trace / RBC: 2-5 /HPF / WBC 3-5 /HPF   Sq Epi:  / Non Sq Epi: Moderate /HPF / Bacteria: Few /HPF        RADIOLOGY & ADDITIONAL STUDIES:

## 2020-12-19 NOTE — DISCHARGE NOTE NURSING/CASE MANAGEMENT/SOCIAL WORK - PATIENT PORTAL LINK FT
You can access the FollowMyHealth Patient Portal offered by [facility] by registering at the following website: [email or web address]. By joining [facility]’s FollowMyHealth portal, you will also be able to view your health information using other applications (apps) compatible with our system.

## 2020-12-19 NOTE — PROGRESS NOTE ADULT - ASSESSMENT
# ESRD S/P HD earlier today, >3.5kg UF achieved. Flowsheet reviewed, BP stable during treatment.   # HTN. Stable on Norvasc.  # anemia of CKD. hgb at goal. cont retacrit on HD   # CKDMBD cont sevelamer  # syncope- work-up ongoing  
# ESRD S/P HD yesterday.    # HTN. Stable on Norvasc.  # anemia of CKD. hgb at goal. cont retacrit on HD   # CKDMBD cont sevelamer  # syncope- work-up ongoing  
# ESRD S/P HD yesterday >3.5kg UF achieved. Flowsheet reviewed, BP stable during treatment. Next HD 12/21  # HTN. Stable on Norvasc./  # anemia of CKD. hgb at goal. cont retacrit on HD   # CKDMBD cont sevelamer  # syncope- work-up ongoing  
Patient is 40 year old Male from home lives with mother, extensive PMHx including T2DM, HTN, ESRD (MWF), CHF, COPD on home O2 (3L), bipolar disorder, schizophrenia, depression  BIBA presenting after 2 unwitnessed syncopal episodes at home. Admitted for syncopy workup.
Patient is 40 year old Male from home lives with mother, extensive PMHx including T2DM, HTN, ESRD (MWF), CHF, COPD on home O2 (3L), bipolar disorder, schizophrenia, depression  BIBA presenting after 2 unwitnessed syncopal episodes at home. Admitted for syncopy workup.

## 2020-12-19 NOTE — PROGRESS NOTE ADULT - SUBJECTIVE AND OBJECTIVE BOX
Patient denies chest pain or shortness of breath.   Review of systems otherwise (-)  	  MEDICATIONS:  MEDICATIONS  (STANDING):  amLODIPine   Tablet 5 milliGRAM(s) Oral daily  atorvastatin 40 milliGRAM(s) Oral at bedtime  budesonide 160 MICROgram(s)/formoterol 4.5 MICROgram(s) Inhaler 2 Puff(s) Inhalation two times a day  epoetin cyndie-epbx (RETACRIT) Injectable 4000 Unit(s) IV Push <User Schedule>  gabapentin 300 milliGRAM(s) Oral at bedtime  heparin   Injectable 5000 Unit(s) SubCutaneous every 12 hours  influenza   Vaccine 0.5 milliLiter(s) IntraMuscular once  metoprolol tartrate 50 milliGRAM(s) Oral two times a day  mirtazapine 15 milliGRAM(s) Oral at bedtime  pantoprazole    Tablet 40 milliGRAM(s) Oral before breakfast  QUEtiapine 50 milliGRAM(s) Oral at bedtime  risperiDONE   Tablet 2 milliGRAM(s) Oral at bedtime  senna 2 Tablet(s) Oral at bedtime  sevelamer carbonate 800 milliGRAM(s) Oral three times a day with meals      LABS:	 	    CARDIAC MARKERS:  CARDIAC MARKERS ( 16 Dec 2020 12:26 )  0.083 ng/mL / x     / x     / x     / x      CARDIAC MARKERS ( 16 Dec 2020 05:58 )  0.102 ng/mL / x     / x     / x     / x      CARDIAC MARKERS ( 15 Dec 2020 21:16 )  0.112 ng/mL / x     / x     / x     / x                                10.4   8.13  )-----------( 233      ( 18 Dec 2020 10:53 )             32.9     Hemoglobin: 10.4 g/dL (12-18 @ 10:53)  Hemoglobin: 11.4 g/dL (12-17 @ 09:05)  Hemoglobin: 10.6 g/dL (12-16 @ 05:58)  Hemoglobin: 10.8 g/dL (12-15 @ 21:16)      12-18    130<L>  |  90<L>  |  85<H>  ----------------------------<  143<H>  4.5   |  25  |  10.50<H>    Ca    10.0      18 Dec 2020 10:53  Phos  8.3     12-18  Mg     2.2     12-18    TPro  7.7  /  Alb  3.3<L>  /  TBili  0.4  /  DBili  x   /  AST  7<L>  /  ALT  16  /  AlkPhos  161<H>  12-18    Creatinine Trend: 10.50<--, 8.01<--, 9.25<--, 8.36<--      PHYSICAL EXAM:  T(C): 36.6 (12-18-20 @ 10:40), Max: 36.7 (12-17-20 @ 19:10)  HR: 81 (12-18-20 @ 10:40) (77 - 82)  BP: 155/85 (12-18-20 @ 10:40) (155/85 - 178/95)  RR: 17 (12-18-20 @ 10:40) (17 - 20)  SpO2: 99% (12-18-20 @ 10:40) (99% - 100%)  Wt(kg): --  I&O's Summary    17 Dec 2020 07:01  -  18 Dec 2020 07:00  --------------------------------------------------------  IN: 0 mL / OUT: 200 mL / NET: -200 mL    18 Dec 2020 07:01  -  18 Dec 2020 11:39  --------------------------------------------------------  IN: 250 mL / OUT: 0 mL / NET: 250 mL      HEENT:  (-)icterus (-)pallor  CV: N S1 S2 1/6 ERNESTINE (+)2 Pulses B/l  Resp:  Clear to ausculatation B/L, normal effort  GI: (+) BS Soft, NT, ND  Lymph:  (-)Edema, (-)obvious lymphadenopathy  Skin: Warm to touch, Normal turgor  Psych: Appropriate mood and affect      TELEMETRY: 	 sinus      ASSESSMENT/PLAN: 	40y Male  PMHx including T2DM, HTN, ESRD (MWF), CHF, COPD on home O2 (3L), bipolar disorder, schizophrenia,depression BIBA presenting after 2 unwitnessed syncopal episodes at home also noted with recent shaking events    - Echo with no pertinent findings  - Orthostatic BP  - Neuro eval noted  - HD per renal  - No need for further inpatient cardiac work up.  - can D/c tele      Clifford Parks MD, [facility]  BEEPER [phone]

## 2020-12-19 NOTE — PROGRESS NOTE ADULT - ATTENDING COMMENTS
Kyler Villalobos MD  [facility]  Office [phone]  Ans Serv [phone] cell - [phone]
Kyler Villalobos MD  [facility]  Office [phone]  Ans Serv [phone] cell - [phone]
Patient is 40 year old Male  PMHx including T2DM, HTN, ESRD (MWF), CHF, COPD on home O2 (3L), bipolar disorder, schizophrenia, depression admitted due to unwitnessed syncopal episodes at home.   pt seen and evluated by bedside, provides no complaints currently, no active tremors, no syncopal episodes while admitted.   - follow-up with neuro recommendations.   - May also be due to psych meds, although pt does not report recent dose changes, will give a f/u with psychiatrist as outpt.   - ESRD as scheduled.   -supplemental O2 as needed.   -DVT ppx
Pt seen by bedside.   Pt has chronic tremors at baseline  Neuro eval done - no seizure like activity on EEG and no further neurological work up needed.   pt is s/p HD today.   Stable for dc, however, bp elevated, will stabalize bp and dc pt likely tomorrow.

## 2020-12-19 NOTE — DISCHARGE NOTE NURSING/CASE MANAGEMENT/SOCIAL WORK - NSDCVIVACCINE_GEN_ALL_CORE_FT
Influenza , 2016/4/7 12:13 , Halle Ellsworth (RN)  Influenza , 2018/12/6 14:41 , Rhonda Kern (RN)  Influenza , 2019/3/5 16:51 , Jace Best (RN)  Influenza , 2020/12/19 12:14 , Delmer Weinberg (RN)

## 2020-12-21 LAB
CULTURE RESULTS: SIGNIFICANT CHANGE UP
SPECIMEN SOURCE: SIGNIFICANT CHANGE UP

## 2020-12-22 NOTE — PAST MEDICAL HISTORY
[Increasing age ( >40 years old)] : Increasing age ( >40 years old) [Altered mobility] : Altered mobility [Obesity: BMI >25] : Obesity: BMI >25 [No therapy indicated for cases scheduled for less than one hour] : No therapy indicated for cases scheduled for less than one hour. [FreeTextEntry1] : Malignant Hyperthermia Screening Tool and Risk of Bleeding Assessment\par Mr. PRESTON JOHNSON denies family history of unexpected death following Anesthesia or Exercise.\par Denies Family history of Malignant Hyperthermia, Muscle or Neuromuscular disorder and High Temperature following exercise.\par \par Mr. PRESTON JOHNSON denies history of Muscle Spasm, Dark or Chocolate - Colored urine and Unanticipated fever immediately following anesthesia or serious exercise. \par Mr. JOHNSON also denies bleeding tendencies/ Risks of Bleeding.\par

## 2020-12-22 NOTE — PROCEDURE
[Other: _____] : [unfilled] [Resume diet] : resume diet [Site check for bleeding/hematoma/thrill/bruit] : Site check for bleeding/hematoma/thrill/bruit [Vital signs on admission the q 15 mins x2] : Vital signs on admission the q 15 mins x2 [D/C IV on discharge] : D/C IV on discharge [FreeTextEntry1] : left arm fistula fistulogram/angioplasty

## 2020-12-22 NOTE — HISTORY OF PRESENT ILLNESS
[] : left brachiocephalic fistula [FreeTextEntry1] : 2016  in [place] [FreeTextEntry4] : yesterday  [FreeTextEntry5] : yesterday at 10pm [FreeTextEntry6] : Dr. Jimenez

## 2020-12-28 PROCEDURE — 86769 SARS-COV-2 COVID-19 ANTIBODY: CPT

## 2020-12-28 PROCEDURE — 71045 X-RAY EXAM CHEST 1 VIEW: CPT

## 2020-12-28 PROCEDURE — 82009 KETONE BODYS QUAL: CPT

## 2020-12-28 PROCEDURE — 83036 HEMOGLOBIN GLYCOSYLATED A1C: CPT

## 2020-12-28 PROCEDURE — 83690 ASSAY OF LIPASE: CPT

## 2020-12-28 PROCEDURE — 87086 URINE CULTURE/COLONY COUNT: CPT

## 2020-12-28 PROCEDURE — 93005 ELECTROCARDIOGRAM TRACING: CPT

## 2020-12-28 PROCEDURE — 83880 ASSAY OF NATRIURETIC PEPTIDE: CPT

## 2020-12-28 PROCEDURE — 94640 AIRWAY INHALATION TREATMENT: CPT

## 2020-12-28 PROCEDURE — 80061 LIPID PANEL: CPT

## 2020-12-28 PROCEDURE — 99261: CPT

## 2020-12-28 PROCEDURE — 80053 COMPREHEN METABOLIC PANEL: CPT

## 2020-12-28 PROCEDURE — 82140 ASSAY OF AMMONIA: CPT

## 2020-12-28 PROCEDURE — 87635 SARS-COV-2 COVID-19 AMP PRB: CPT

## 2020-12-28 PROCEDURE — 73562 X-RAY EXAM OF KNEE 3: CPT

## 2020-12-28 PROCEDURE — 95957 EEG DIGITAL ANALYSIS: CPT

## 2020-12-28 PROCEDURE — 84443 ASSAY THYROID STIM HORMONE: CPT

## 2020-12-28 PROCEDURE — 83735 ASSAY OF MAGNESIUM: CPT

## 2020-12-28 PROCEDURE — 90686 IIV4 VACC NO PRSV 0.5 ML IM: CPT

## 2020-12-28 PROCEDURE — 85025 COMPLETE CBC W/AUTO DIFF WBC: CPT

## 2020-12-28 PROCEDURE — 93306 TTE W/DOPPLER COMPLETE: CPT

## 2020-12-28 PROCEDURE — 81001 URINALYSIS AUTO W/SCOPE: CPT

## 2020-12-28 PROCEDURE — 83615 LACTATE (LD) (LDH) ENZYME: CPT

## 2020-12-28 PROCEDURE — 87340 HEPATITIS B SURFACE AG IA: CPT

## 2020-12-28 PROCEDURE — 82607 VITAMIN B-12: CPT

## 2020-12-28 PROCEDURE — 84100 ASSAY OF PHOSPHORUS: CPT

## 2020-12-28 PROCEDURE — 82962 GLUCOSE BLOOD TEST: CPT

## 2020-12-28 PROCEDURE — 82728 ASSAY OF FERRITIN: CPT

## 2020-12-28 PROCEDURE — 95819 EEG AWAKE AND ASLEEP: CPT

## 2020-12-28 PROCEDURE — 85045 AUTOMATED RETICULOCYTE COUNT: CPT

## 2020-12-28 PROCEDURE — 85027 COMPLETE CBC AUTOMATED: CPT

## 2020-12-28 PROCEDURE — 99285 EMERGENCY DEPT VISIT HI MDM: CPT | Mod: 25

## 2020-12-28 PROCEDURE — 84484 ASSAY OF TROPONIN QUANT: CPT

## 2020-12-28 PROCEDURE — 70450 CT HEAD/BRAIN W/O DYE: CPT

## 2020-12-28 PROCEDURE — 93880 EXTRACRANIAL BILAT STUDY: CPT

## 2020-12-28 PROCEDURE — 36415 COLL VENOUS BLD VENIPUNCTURE: CPT

## 2020-12-28 PROCEDURE — 87040 BLOOD CULTURE FOR BACTERIA: CPT

## 2020-12-28 PROCEDURE — 82746 ASSAY OF FOLIC ACID SERUM: CPT

## 2020-12-28 PROCEDURE — 82977 ASSAY OF GGT: CPT

## 2020-12-28 PROCEDURE — 86706 HEP B SURFACE ANTIBODY: CPT

## 2021-01-24 ENCOUNTER — LABORATORY RESULT (OUTPATIENT)
Age: 41
End: 2021-01-24

## 2021-01-25 ENCOUNTER — APPOINTMENT (OUTPATIENT)
Dept: DISASTER EMERGENCY | Facility: CLINIC | Age: 41
End: 2021-01-25

## 2021-01-25 NOTE — PROCEDURE
[Other: _____] : [unfilled] [D/C IV on discharge] : D/C IV on discharge [Resume diet] : resume diet [Site check for bleeding/hematoma/thrill/bruit] : Site check for bleeding/hematoma/thrill/bruit [Vital signs on admission the q 15 mins x2] : Vital signs on admission the q 15 mins x2 [FreeTextEntry1] : left arm fistula fistulogram/angioplasty

## 2021-01-26 ENCOUNTER — APPOINTMENT (OUTPATIENT)
Dept: ENDOVASCULAR SURGERY | Facility: CLINIC | Age: 41
End: 2021-01-26

## 2021-01-27 ENCOUNTER — APPOINTMENT (OUTPATIENT)
Dept: ENDOVASCULAR SURGERY | Facility: CLINIC | Age: 41
End: 2021-01-27

## 2021-01-29 ENCOUNTER — APPOINTMENT (OUTPATIENT)
Dept: VASCULAR SURGERY | Facility: CLINIC | Age: 41
End: 2021-01-29

## 2021-02-03 ENCOUNTER — APPOINTMENT (OUTPATIENT)
Dept: ENDOVASCULAR SURGERY | Facility: CLINIC | Age: 41
End: 2021-02-03
Payer: MEDICARE

## 2021-02-03 PROCEDURE — 99213 OFFICE O/P EST LOW 20 MIN: CPT

## 2021-02-04 NOTE — PHYSICAL EXAM
[Pulsatile Thrill] : pulsatile thrill [Aneurysm] : aneurysm [Normal] : normoactive bowel sounds, soft and nontender, no hepatosplenomegaly or masses appreciated

## 2021-02-04 NOTE — ASSESSMENT
[FreeTextEntry1] : Patient with renal failure with malfunctioning left upper extremity AV fistula associated with significant pain and enlarging aneurysms.  Recommend resection of the aneurysm with revision using a bypass graft to the left internal jugular vein.  Patient will need a PermCath 1 to 2 days prior to revision.  This was all discussed with the patient in detail.

## 2021-02-04 NOTE — HISTORY OF PRESENT ILLNESS
[FreeTextEntry1] : Patient with renal failure.  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  Patient continues to complain of significant pain or the entire length of the fistula and specifically one of the aneurysms which has a stent in place.

## 2021-02-08 ENCOUNTER — INPATIENT (INPATIENT)
Facility: HOSPITAL | Age: 41
LOS: 6 days | Discharge: HOME | DRG: 252 | End: 2021-02-15
Attending: HOSPITALIST | Admitting: HOSPITALIST
Payer: MEDICARE

## 2021-02-08 VITALS
WEIGHT: 315 LBS | TEMPERATURE: 98 F | SYSTOLIC BLOOD PRESSURE: 150 MMHG | HEART RATE: 81 BPM | OXYGEN SATURATION: 99 % | RESPIRATION RATE: 19 BRPM | HEIGHT: 78 IN | DIASTOLIC BLOOD PRESSURE: 89 MMHG

## 2021-02-08 DIAGNOSIS — I77.0 ARTERIOVENOUS FISTULA, ACQUIRED: ICD-10-CM

## 2021-02-08 DIAGNOSIS — Z98.890 OTHER SPECIFIED POSTPROCEDURAL STATES: Chronic | ICD-10-CM

## 2021-02-08 DIAGNOSIS — I10 ESSENTIAL (PRIMARY) HYPERTENSION: ICD-10-CM

## 2021-02-08 DIAGNOSIS — N50.89 OTHER SPECIFIED DISORDERS OF THE MALE GENITAL ORGANS: ICD-10-CM

## 2021-02-08 DIAGNOSIS — E11.9 TYPE 2 DIABETES MELLITUS WITHOUT COMPLICATIONS: ICD-10-CM

## 2021-02-08 DIAGNOSIS — J44.9 CHRONIC OBSTRUCTIVE PULMONARY DISEASE, UNSPECIFIED: ICD-10-CM

## 2021-02-08 DIAGNOSIS — Z29.9 ENCOUNTER FOR PROPHYLACTIC MEASURES, UNSPECIFIED: ICD-10-CM

## 2021-02-08 DIAGNOSIS — N18.6 END STAGE RENAL DISEASE: ICD-10-CM

## 2021-02-08 DIAGNOSIS — T82.898A OTHER SPECIFIED COMPLICATION OF VASCULAR PROSTHETIC DEVICES, IMPLANTS AND GRAFTS, INITIAL ENCOUNTER: ICD-10-CM

## 2021-02-08 DIAGNOSIS — F20.9 SCHIZOPHRENIA, UNSPECIFIED: ICD-10-CM

## 2021-02-08 DIAGNOSIS — F31.9 BIPOLAR DISORDER, UNSPECIFIED: ICD-10-CM

## 2021-02-08 LAB
ALBUMIN SERPL ELPH-MCNC: 3.5 G/DL — SIGNIFICANT CHANGE UP (ref 3.5–5)
ALP SERPL-CCNC: 151 U/L — HIGH (ref 40–120)
ALT FLD-CCNC: 22 U/L DA — SIGNIFICANT CHANGE UP (ref 10–60)
ANION GAP SERPL CALC-SCNC: 4 MMOL/L — LOW (ref 5–17)
APPEARANCE UR: CLEAR — SIGNIFICANT CHANGE UP
AST SERPL-CCNC: 17 U/L — SIGNIFICANT CHANGE UP (ref 10–40)
BASOPHILS # BLD AUTO: 0.04 K/UL — SIGNIFICANT CHANGE UP (ref 0–0.2)
BASOPHILS NFR BLD AUTO: 0.5 % — SIGNIFICANT CHANGE UP (ref 0–2)
BILIRUB SERPL-MCNC: 0.3 MG/DL — SIGNIFICANT CHANGE UP (ref 0.2–1.2)
BILIRUB UR-MCNC: NEGATIVE — SIGNIFICANT CHANGE UP
BUN SERPL-MCNC: 25 MG/DL — HIGH (ref 7–18)
CALCIUM SERPL-MCNC: 10.1 MG/DL — SIGNIFICANT CHANGE UP (ref 8.4–10.5)
CHLORIDE SERPL-SCNC: 99 MMOL/L — SIGNIFICANT CHANGE UP (ref 96–108)
CO2 SERPL-SCNC: 31 MMOL/L — SIGNIFICANT CHANGE UP (ref 22–31)
COLOR SPEC: YELLOW — SIGNIFICANT CHANGE UP
CREAT SERPL-MCNC: 6.07 MG/DL — HIGH (ref 0.5–1.3)
DIFF PNL FLD: ABNORMAL
EOSINOPHIL # BLD AUTO: 0.39 K/UL — SIGNIFICANT CHANGE UP (ref 0–0.5)
EOSINOPHIL NFR BLD AUTO: 4.8 % — SIGNIFICANT CHANGE UP (ref 0–6)
GLUCOSE BLDC GLUCOMTR-MCNC: 136 MG/DL — HIGH (ref 70–99)
GLUCOSE SERPL-MCNC: 98 MG/DL — SIGNIFICANT CHANGE UP (ref 70–99)
GLUCOSE UR QL: 100 MG/DL
HCT VFR BLD CALC: 39.5 % — SIGNIFICANT CHANGE UP (ref 39–50)
HGB BLD-MCNC: 12 G/DL — LOW (ref 13–17)
IMM GRANULOCYTES NFR BLD AUTO: 0.2 % — SIGNIFICANT CHANGE UP (ref 0–1.5)
KETONES UR-MCNC: NEGATIVE — SIGNIFICANT CHANGE UP
LEUKOCYTE ESTERASE UR-ACNC: NEGATIVE — SIGNIFICANT CHANGE UP
LYMPHOCYTES # BLD AUTO: 1.12 K/UL — SIGNIFICANT CHANGE UP (ref 1–3.3)
LYMPHOCYTES # BLD AUTO: 13.7 % — SIGNIFICANT CHANGE UP (ref 13–44)
MCHC RBC-ENTMCNC: 28.6 PG — SIGNIFICANT CHANGE UP (ref 27–34)
MCHC RBC-ENTMCNC: 30.4 GM/DL — LOW (ref 32–36)
MCV RBC AUTO: 94.3 FL — SIGNIFICANT CHANGE UP (ref 80–100)
MONOCYTES # BLD AUTO: 0.74 K/UL — SIGNIFICANT CHANGE UP (ref 0–0.9)
MONOCYTES NFR BLD AUTO: 9.1 % — SIGNIFICANT CHANGE UP (ref 2–14)
NEUTROPHILS # BLD AUTO: 5.84 K/UL — SIGNIFICANT CHANGE UP (ref 1.8–7.4)
NEUTROPHILS NFR BLD AUTO: 71.7 % — SIGNIFICANT CHANGE UP (ref 43–77)
NITRITE UR-MCNC: NEGATIVE — SIGNIFICANT CHANGE UP
NRBC # BLD: 0 /100 WBCS — SIGNIFICANT CHANGE UP (ref 0–0)
PH UR: 8 — SIGNIFICANT CHANGE UP (ref 5–8)
PLATELET # BLD AUTO: 215 K/UL — SIGNIFICANT CHANGE UP (ref 150–400)
POTASSIUM SERPL-MCNC: 4.7 MMOL/L — SIGNIFICANT CHANGE UP (ref 3.5–5.3)
POTASSIUM SERPL-SCNC: 4.7 MMOL/L — SIGNIFICANT CHANGE UP (ref 3.5–5.3)
PROT SERPL-MCNC: 8.6 G/DL — HIGH (ref 6–8.3)
PROT UR-MCNC: 500 MG/DL
RBC # BLD: 4.19 M/UL — LOW (ref 4.2–5.8)
RBC # FLD: 14.3 % — SIGNIFICANT CHANGE UP (ref 10.3–14.5)
SARS-COV-2 RNA SPEC QL NAA+PROBE: SIGNIFICANT CHANGE UP
SODIUM SERPL-SCNC: 134 MMOL/L — LOW (ref 135–145)
SP GR SPEC: 1.01 — SIGNIFICANT CHANGE UP (ref 1.01–1.02)
UROBILINOGEN FLD QL: NEGATIVE — SIGNIFICANT CHANGE UP
WBC # BLD: 8.15 K/UL — SIGNIFICANT CHANGE UP (ref 3.8–10.5)
WBC # FLD AUTO: 8.15 K/UL — SIGNIFICANT CHANGE UP (ref 3.8–10.5)

## 2021-02-08 PROCEDURE — 99285 EMERGENCY DEPT VISIT HI MDM: CPT

## 2021-02-08 PROCEDURE — 99222 1ST HOSP IP/OBS MODERATE 55: CPT

## 2021-02-08 RX ORDER — HEPARIN SODIUM 5000 [USP'U]/ML
5000 INJECTION INTRAVENOUS; SUBCUTANEOUS EVERY 8 HOURS
Refills: 0 | Status: DISCONTINUED | OUTPATIENT
Start: 2021-02-08 | End: 2021-02-10

## 2021-02-08 RX ORDER — INSULIN LISPRO 100/ML
VIAL (ML) SUBCUTANEOUS
Refills: 0 | Status: DISCONTINUED | OUTPATIENT
Start: 2021-02-08 | End: 2021-02-11

## 2021-02-08 RX ORDER — MIRTAZAPINE 45 MG/1
15 TABLET, ORALLY DISINTEGRATING ORAL AT BEDTIME
Refills: 0 | Status: DISCONTINUED | OUTPATIENT
Start: 2021-02-08 | End: 2021-02-11

## 2021-02-08 RX ORDER — ALBUTEROL 90 UG/1
2 AEROSOL, METERED ORAL EVERY 6 HOURS
Refills: 0 | Status: DISCONTINUED | OUTPATIENT
Start: 2021-02-08 | End: 2021-02-11

## 2021-02-08 RX ORDER — OXYCODONE AND ACETAMINOPHEN 5; 325 MG/1; MG/1
1 TABLET ORAL ONCE
Refills: 0 | Status: DISCONTINUED | OUTPATIENT
Start: 2021-02-08 | End: 2021-02-08

## 2021-02-08 RX ORDER — SEVELAMER CARBONATE 2400 MG/1
800 POWDER, FOR SUSPENSION ORAL
Refills: 0 | Status: DISCONTINUED | OUTPATIENT
Start: 2021-02-08 | End: 2021-02-11

## 2021-02-08 RX ORDER — GABAPENTIN 400 MG/1
300 CAPSULE ORAL AT BEDTIME
Refills: 0 | Status: DISCONTINUED | OUTPATIENT
Start: 2021-02-08 | End: 2021-02-11

## 2021-02-08 RX ORDER — METOPROLOL TARTRATE 50 MG
50 TABLET ORAL
Refills: 0 | Status: DISCONTINUED | OUTPATIENT
Start: 2021-02-08 | End: 2021-02-11

## 2021-02-08 RX ORDER — RISPERIDONE 4 MG/1
2 TABLET ORAL DAILY
Refills: 0 | Status: DISCONTINUED | OUTPATIENT
Start: 2021-02-08 | End: 2021-02-11

## 2021-02-08 RX ORDER — ATORVASTATIN CALCIUM 80 MG/1
40 TABLET, FILM COATED ORAL AT BEDTIME
Refills: 0 | Status: DISCONTINUED | OUTPATIENT
Start: 2021-02-08 | End: 2021-02-11

## 2021-02-08 RX ORDER — DEXTROSE 50 % IN WATER 50 %
25 SYRINGE (ML) INTRAVENOUS ONCE
Refills: 0 | Status: DISCONTINUED | OUTPATIENT
Start: 2021-02-08 | End: 2021-02-11

## 2021-02-08 RX ORDER — QUETIAPINE FUMARATE 200 MG/1
50 TABLET, FILM COATED ORAL AT BEDTIME
Refills: 0 | Status: DISCONTINUED | OUTPATIENT
Start: 2021-02-08 | End: 2021-02-11

## 2021-02-08 RX ORDER — NIFEDIPINE 30 MG
30 TABLET, EXTENDED RELEASE 24 HR ORAL DAILY
Refills: 0 | Status: DISCONTINUED | OUTPATIENT
Start: 2021-02-08 | End: 2021-02-11

## 2021-02-08 RX ORDER — GLUCAGON INJECTION, SOLUTION 0.5 MG/.1ML
1 INJECTION, SOLUTION SUBCUTANEOUS ONCE
Refills: 0 | Status: DISCONTINUED | OUTPATIENT
Start: 2021-02-08 | End: 2021-02-11

## 2021-02-08 RX ADMIN — Medication 1 TABLET(S): at 20:03

## 2021-02-08 RX ADMIN — Medication 30 MILLIGRAM(S): at 22:19

## 2021-02-08 RX ADMIN — ATORVASTATIN CALCIUM 40 MILLIGRAM(S): 80 TABLET, FILM COATED ORAL at 22:19

## 2021-02-08 RX ADMIN — OXYCODONE AND ACETAMINOPHEN 1 TABLET(S): 5; 325 TABLET ORAL at 19:54

## 2021-02-08 RX ADMIN — HEPARIN SODIUM 5000 UNIT(S): 5000 INJECTION INTRAVENOUS; SUBCUTANEOUS at 22:19

## 2021-02-08 RX ADMIN — QUETIAPINE FUMARATE 50 MILLIGRAM(S): 200 TABLET, FILM COATED ORAL at 22:19

## 2021-02-08 RX ADMIN — RISPERIDONE 2 MILLIGRAM(S): 4 TABLET ORAL at 22:19

## 2021-02-08 RX ADMIN — MIRTAZAPINE 15 MILLIGRAM(S): 45 TABLET, ORALLY DISINTEGRATING ORAL at 22:28

## 2021-02-08 NOTE — ED ADULT NURSE NOTE - NSIMPLEMENTINTERV_GEN_ALL_ED
Implemented All Fall with Harm Risk Interventions:  [place] to call system. Call bell, personal items and telephone within reach. Instruct patient to call for assistance. Room bathroom lighting operational. Non-slip footwear when patient is off stretcher. Physically safe environment: no spills, clutter or unnecessary equipment. Stretcher in lowest position, wheels locked, appropriate side rails in place. Provide visual cue, wrist band, yellow gown, etc. Monitor gait and stability. Monitor for mental status changes and reorient to person, place, and time. Review medications for side effects contributing to fall risk. Reinforce activity limits and safety measures with patient and family. Provide visual clues: red socks.

## 2021-02-08 NOTE — ED PROVIDER NOTE - GENITOURINARY, MLM
Approximately 2 cm area slight superficial fluctuance to right upper scrotal area, which is nontender and without surrounding erythema/induration/crepitus; No discharge or other lesions.  No testicular swelling or tenderness.

## 2021-02-08 NOTE — H&P ADULT - PROBLEM SELECTOR PLAN 1
Patient has left arm fistula, with palpable bruit   Increased pain for sometime, no improvement  Difficulty with dialysis  Suspected Aneurysm  Plan to revise on 2/11 by Dr Foote  No indication of antibiotics for now   - Pain control with tylenol and percocet   Pre op monitoring   RCRI score: 2 , Class III risk, patient has intermediate risk for post operative complications   - Echo from December shows normal cardiac function, however ,patient is morbidly obese   - Vascular follow up Dr Foote

## 2021-02-08 NOTE — H&P ADULT - NSHPPHYSICALEXAM_GEN_ALL_CORE
PHYSICAL EXAM:  GENERAL: NAD, speaks in full sentences, no signs of respiratory distress , Morbid obese  HEAD:  Atraumatic, Normocephalic  EYES: EOMI, PERRLA, conjunctiva and sclera clear  NECK: Supple, No JVD  CHEST/LUNG: Clear to auscultation bilaterally; No wheeze; No crackles; No accessory muscles used  HEART: Regular rate and rhythm; No murmurs;   ABDOMEN: Soft, Nontender, Nondistended; Bowel sounds present; No guarding  EXTREMITIES:  2+ Peripheral Pulses, No cyanosis or edema  PSYCH: AAOx3  NEUROLOGY: non-focal  SKIN: AV fistula with palpable bruit,

## 2021-02-08 NOTE — ED PROVIDER NOTE - PHYSICAL EXAMINATION
left arm AV fistula with normal bruit/thrill, but appears dilated to about 5 cm, nontender, no bleeding/discharge/erythema/induration

## 2021-02-08 NOTE — ED ADULT NURSE NOTE - OBJECTIVE STATEMENT
C/o painful growth to scrotum.  Patient also has pain to left arm av graft.  Described as swollen and hot to touch.  Patient states he completed dialysis today

## 2021-02-08 NOTE — H&P ADULT - PROBLEM SELECTOR PLAN 5
Patient reprots he has not denzel using his insulin as sugars runs around 90  Will start sliding scale for now  Diabetic diet   A1C from december 4.6 Continue home medications with parameters   Dash diet

## 2021-02-08 NOTE — H&P ADULT - PROBLEM SELECTOR PLAN 8
RISK                                                          Points  [] Previous VTE                                           3  [] Thrombophilia                                        2  [] Lower limb paralysis                              2   [] Current Cancer                                       2   [x] Immobilization > 24 hrs                        1  [] ICU/CCU stay > 24 hours                       1  [x] Age > 60                                                   1    Heparin 5000U SQ Q8 Continue home medications   No indication of involving psych as of now

## 2021-02-08 NOTE — H&P ADULT - PROBLEM SELECTOR PLAN 9
RISK                                                          Points  [] Previous VTE                                           3  [] Thrombophilia                                        2  [] Lower limb paralysis                              2   [] Current Cancer                                       2   [x] Immobilization > 24 hrs                        1  [] ICU/CCU stay > 24 hours                       1  [x] Age > 60                                                   1    Heparin 5000U SQ Q8

## 2021-02-08 NOTE — H&P ADULT - HISTORY OF PRESENT ILLNESS
40 year old man with PMH of DM, ESRD on dialysis, Hypertension, COPD, Bipolar diease and schizophrenia, followed by Dr La closely sent in for AV fistula evaluation post HD today for suspected aneurysm. Patient is not a good historian and most history is obtained from ED provider note and medical charts. Patient has been complaining of pain at the AV fistula site for quite sometime and he is sent to hospital for fistula repair on 2/11 and requires medical optimization.   He also reports a small scrotal bump/swelling for few days which is painful but not erythematous.   Patient denies chest pain, SOB, dysuria, hematuria, scrotal discharge, diarrhea, constipation.     ED Course:  Cr: 6.07, BUN : 25 (post dialysis)  1 dose of Bactrim and 1 Percocet     Vital Signs Last 24 Hrs  T(C): 36.8 (08 Feb 2021 19:10), Max: 36.8 (08 Feb 2021 19:10)  T(F): 98.2 (08 Feb 2021 19:10), Max: 98.2 (08 Feb 2021 19:10)  HR: 85 (08 Feb 2021 19:10) (81 - 85)  BP: 156/89 (08 Feb 2021 19:10) (150/89 - 156/89)  RR: 19 (08 Feb 2021 19:10) (19 - 19)  SpO2: 98% (08 Feb 2021 19:10) (98% - 99%)

## 2021-02-08 NOTE — H&P ADULT - PROBLEM SELECTOR PLAN 3
Patient is non compliant, he uses inhaler irregularly   Currently not in any exacerbation   Will continue albuterol inhaler PRN Patient has small skin swelling around 1 cm,   non-erythematous, no discharge  Likely a nodule or lymph node,   Will start bactrim PO  f/u ESR , CRP Patient has small skin swelling around 1 cm,   non-erythematous, no discharge  Likely a nodule or lymph node,   Will start bactrim PO  f/u ESR , CRP  f/u usg scrotum

## 2021-02-08 NOTE — H&P ADULT - PROBLEM SELECTOR PLAN 4
Continue home medications with parameters   Dash diet Patient is non compliant, he uses inhaler irregularly   Currently not in any exacerbation   Will continue albuterol inhaler PRN

## 2021-02-08 NOTE — ED PROVIDER NOTE - PMH
Anxiety    Asthma with COPD    Bipolar disorder    COPD (chronic obstructive pulmonary disease)    Diabetes mellitus, type 2    DM (diabetes mellitus)    ESRD on dialysis    HTN (hypertension)    Hypertension    Migraine    Morbid obesity with BMI of 40.0-44.9, adult    Renal failure    Schizophrenia

## 2021-02-08 NOTE — H&P ADULT - PROBLEM SELECTOR PLAN 6
Continue home medications   No indication of involving psych as of now Patient reprots he has not denzel using his insulin as sugars runs around 90  Will start sliding scale for now  Diabetic diet   A1C from december 4.6 Patient reports he has not denzel using his insulin as sugars runs around 90  Will start sliding scale for now  Diabetic diet   A1C from december 4.6

## 2021-02-08 NOTE — H&P ADULT - ASSESSMENT
40 year old man with PMH of DM, ESRD on dialysis, Hypertension, COPD, Bipolar diease and schizophrenia, followed by Dr La closely sent in for AV fistula evaluation post HD today for suspected aneurysm. Patient is admitted for AV fistula repair/revision

## 2021-02-08 NOTE — ED PROVIDER NOTE - OBJECTIVE STATEMENT
41 y/o man, h/o ESRD on HD, DM, HTN, Bipolar, Schizophrenia, c/o left arm AV fistula swelling and pain x about 1 week, worse today after he had dialysis completed.  No fever/chills/injury/numbness/weakness/bleeding.  He also c/o few days of slight area of swelling and discomfort to upper part of scrotum which he is worried that is a developing soft tissue abscess; he had similar problem previously which was much larger.  No drainage from the area.  No pain/swelling to actual testicular area.

## 2021-02-08 NOTE — ED PROVIDER NOTE - CLINICAL SUMMARY MEDICAL DECISION MAKING FREE TEXT BOX
39 y/o man, h/o ESRD on HD, DM, HTN, Bipolar, Schizophrenia, c/o left arm AV fistula swelling and pain x about 1 week, worse today after he had dialysis completed.  He also c/o few days of slight area of swelling and discomfort to upper part of scrotum which he is worried that is a developing soft tissue abscess; No pain/swelling to actual testicular area--Labs and discuss with vascular.

## 2021-02-08 NOTE — ED PROVIDER NOTE - PROGRESS NOTE DETAILS
Dr. Faye, vascular, says that Pt may be admitted for revision of AV fistula, which can be done this Thursday, 2/11/21, once he is medically cleared.  Dr. La informed and says to admit to hospitalist.  Dr. Willis paged. Dr. Faye, vascular, says that Pt may be admitted for revision of AV fistula, which can be done this Thursday, 2/11/21, once he is medically cleared.  Dr. La informed and says to admit to hospitalist.  Dr. Willis informed.

## 2021-02-08 NOTE — H&P ADULT - ATTENDING COMMENTS
Pt seen and examined.  Case discussed with MAR.      Vital Signs Last 24 Hrs  T(C): 36.7 (08 Feb 2021 17:34), Max: 36.7 (08 Feb 2021 17:34)  T(F): 98 (08 Feb 2021 17:34), Max: 98 (08 Feb 2021 17:34)  HR: 81 (08 Feb 2021 17:34) (81 - 81)  BP: 150/89 (08 Feb 2021 17:34) (150/89 - 150/89)  RR: 19 (08 Feb 2021 17:34) (19 - 19)  SpO2: 99% (08 Feb 2021 17:34) (99% - 99%)    Exams        Labs                        12.0   8.15  )-----------( 215      ( 08 Feb 2021 19:15 )             39.5     02-08  134<L>  |  99  |  25<H>  ----------------------------<  98  4.7   |  31  |  6.07<H>  Ca    10.1      08 Feb 2021 19:15  TPro  8.6<H>  /  Alb  3.5  /  TBili  0.3  /  DBili  x   /  AST  17  /  ALT  22  /  AlkPhos  151<H>  02-08    UA - noted    Impression  40 year old man with multiple but stable medical problems as detailed above including ESRD on HD followed by Dr La closely sent in for AV fistula evaluation post HD today for suspected aneurysm. ED physician informed me of Dr Foote's plan for revision of AV fistula on 2/11/2021.   Otherwise no acute issues of concern.    A/P  -  AV fistula "aneurysm"  Pt admitted to hospitalist service fo procedure on 2/11/2021  Pre-op evaluation to be completed tonight     - ESRD on HD   Last HD earlier today     - DM 2   resume home meds   No active issues    resume other meds   Other plans as above Pt seen and examined.  Case discussed with MAR.  Pt sent in from HD for evaluation of painful AV fistula. Pain is chronic . Pt in so much discomfort and cannot wait for his procedure later this month. Vascular surgery recommended admission for procedure in a few days.  He also complained on pain around his scrotum.    Vital Signs Last 24 Hrs  T(C): 36.7 (08 Feb 2021 17:34), Max: 36.7 (08 Feb 2021 17:34)  T(F): 98 (08 Feb 2021 17:34), Max: 98 (08 Feb 2021 17:34)  HR: 81 (08 Feb 2021 17:34) (81 - 81)  BP: 150/89 (08 Feb 2021 17:34) (150/89 - 150/89)  RR: 19 (08 Feb 2021 17:34) (19 - 19)  SpO2: 99% (08 Feb 2021 17:34) (99% - 99%)    Exams  Young man, obese, watching TV, NAD AAO X 3  CTA B/L RRR S1s2   Right upper extremity - AV fistula, + thrill in large fistula  Full, abdomen, soft, NTND BS +  Groin - normal anatomy - palpable scrotal L/N enlarged and tender; measures about 1cm in diameter  Otherwise normal phallus, scrotal sac    Labs                        12.0   8.15  )-----------( 215      ( 08 Feb 2021 19:15 )             39.5     02-08  134<L>  |  99  |  25<H>  ----------------------------<  98  4.7   |  31  |  6.07<H>  Ca    10.1      08 Feb 2021 19:15  TPro  8.6<H>  /  Alb  3.5  /  TBili  0.3  /  DBili  x   /  AST  17  /  ALT  22  /  AlkPhos  151<H>  02-08    UA - noted    Impression  40 year old man with multiple but stable medical problems as detailed above including ESRD on HD followed by Dr La closely sent in for AV fistula evaluation post HD today for suspected aneurysm. ED physician informed me of Dr Foote's plan for revision of AV fistula on 2/11/2021.   Otherwise no acute issues of concern.    A/P  -  AV fistula "aneurysm"  Pt admitted to hospitalist service for procedure on 2/11/2021      - left scrotal lymphadenitis  Empiric oral antibiotics    - ESRD on HD   Last HD earlier today     - DM 2   resume home meds   No active issues    resume other meds   Other plans as above

## 2021-02-08 NOTE — H&P ADULT - PROBLEM SELECTOR PLAN 2
s./p dialysis on 2/8  Dr La is the nephrologist   He will follow in house for continuation of dialysis

## 2021-02-08 NOTE — ED ADULT NURSE NOTE - ED STAT RN HANDOFF DETAILS
pt.remained stable. denies pain. transfer to holding area.report given to [place].not  in  distress

## 2021-02-09 LAB
24R-OH-CALCIDIOL SERPL-MCNC: 36.8 NG/ML — SIGNIFICANT CHANGE UP (ref 30–80)
A1C WITH ESTIMATED AVERAGE GLUCOSE RESULT: 4.6 % — SIGNIFICANT CHANGE UP (ref 4–5.6)
ALBUMIN SERPL ELPH-MCNC: 3.1 G/DL — LOW (ref 3.5–5)
ALP SERPL-CCNC: 130 U/L — HIGH (ref 40–120)
ALT FLD-CCNC: 18 U/L DA — SIGNIFICANT CHANGE UP (ref 10–60)
ANION GAP SERPL CALC-SCNC: 7 MMOL/L — SIGNIFICANT CHANGE UP (ref 5–17)
AST SERPL-CCNC: 11 U/L — SIGNIFICANT CHANGE UP (ref 10–40)
BASOPHILS # BLD AUTO: 0.04 K/UL — SIGNIFICANT CHANGE UP (ref 0–0.2)
BASOPHILS NFR BLD AUTO: 0.6 % — SIGNIFICANT CHANGE UP (ref 0–2)
BILIRUB SERPL-MCNC: 0.4 MG/DL — SIGNIFICANT CHANGE UP (ref 0.2–1.2)
BUN SERPL-MCNC: 35 MG/DL — HIGH (ref 7–18)
CALCIUM SERPL-MCNC: 9.6 MG/DL — SIGNIFICANT CHANGE UP (ref 8.4–10.5)
CHLORIDE SERPL-SCNC: 98 MMOL/L — SIGNIFICANT CHANGE UP (ref 96–108)
CHOLEST SERPL-MCNC: 142 MG/DL — SIGNIFICANT CHANGE UP
CO2 SERPL-SCNC: 28 MMOL/L — SIGNIFICANT CHANGE UP (ref 22–31)
CREAT SERPL-MCNC: 7.03 MG/DL — HIGH (ref 0.5–1.3)
CULTURE RESULTS: SIGNIFICANT CHANGE UP
EOSINOPHIL # BLD AUTO: 0.34 K/UL — SIGNIFICANT CHANGE UP (ref 0–0.5)
EOSINOPHIL NFR BLD AUTO: 5.4 % — SIGNIFICANT CHANGE UP (ref 0–6)
ERYTHROCYTE [SEDIMENTATION RATE] IN BLOOD: 27 MM/HR — HIGH (ref 0–15)
ESTIMATED AVERAGE GLUCOSE: 85 MG/DL — SIGNIFICANT CHANGE UP (ref 68–114)
FOLATE SERPL-MCNC: 4 NG/ML — LOW
GLUCOSE BLDC GLUCOMTR-MCNC: 102 MG/DL — HIGH (ref 70–99)
GLUCOSE BLDC GLUCOMTR-MCNC: 108 MG/DL — HIGH (ref 70–99)
GLUCOSE BLDC GLUCOMTR-MCNC: 109 MG/DL — HIGH (ref 70–99)
GLUCOSE BLDC GLUCOMTR-MCNC: 93 MG/DL — SIGNIFICANT CHANGE UP (ref 70–99)
GLUCOSE SERPL-MCNC: 98 MG/DL — SIGNIFICANT CHANGE UP (ref 70–99)
HCT VFR BLD CALC: 36.7 % — LOW (ref 39–50)
HDLC SERPL-MCNC: 47 MG/DL — SIGNIFICANT CHANGE UP
HGB BLD-MCNC: 11 G/DL — LOW (ref 13–17)
IMM GRANULOCYTES NFR BLD AUTO: 0.3 % — SIGNIFICANT CHANGE UP (ref 0–1.5)
LIPID PNL WITH DIRECT LDL SERPL: 74 MG/DL — SIGNIFICANT CHANGE UP
LYMPHOCYTES # BLD AUTO: 1.24 K/UL — SIGNIFICANT CHANGE UP (ref 1–3.3)
LYMPHOCYTES # BLD AUTO: 19.6 % — SIGNIFICANT CHANGE UP (ref 13–44)
MAGNESIUM SERPL-MCNC: 2.3 MG/DL — SIGNIFICANT CHANGE UP (ref 1.6–2.6)
MCHC RBC-ENTMCNC: 28.4 PG — SIGNIFICANT CHANGE UP (ref 27–34)
MCHC RBC-ENTMCNC: 30 GM/DL — LOW (ref 32–36)
MCV RBC AUTO: 94.6 FL — SIGNIFICANT CHANGE UP (ref 80–100)
MONOCYTES # BLD AUTO: 0.59 K/UL — SIGNIFICANT CHANGE UP (ref 0–0.9)
MONOCYTES NFR BLD AUTO: 9.3 % — SIGNIFICANT CHANGE UP (ref 2–14)
NEUTROPHILS # BLD AUTO: 4.1 K/UL — SIGNIFICANT CHANGE UP (ref 1.8–7.4)
NEUTROPHILS NFR BLD AUTO: 64.8 % — SIGNIFICANT CHANGE UP (ref 43–77)
NON HDL CHOLESTEROL: 95 MG/DL — SIGNIFICANT CHANGE UP
NRBC # BLD: 0 /100 WBCS — SIGNIFICANT CHANGE UP (ref 0–0)
PHOSPHATE SERPL-MCNC: 6.9 MG/DL — HIGH (ref 2.5–4.5)
PLATELET # BLD AUTO: 196 K/UL — SIGNIFICANT CHANGE UP (ref 150–400)
POTASSIUM SERPL-MCNC: 4.7 MMOL/L — SIGNIFICANT CHANGE UP (ref 3.5–5.3)
POTASSIUM SERPL-SCNC: 4.7 MMOL/L — SIGNIFICANT CHANGE UP (ref 3.5–5.3)
PROT SERPL-MCNC: 7.4 G/DL — SIGNIFICANT CHANGE UP (ref 6–8.3)
RBC # BLD: 3.88 M/UL — LOW (ref 4.2–5.8)
RBC # FLD: 14.5 % — SIGNIFICANT CHANGE UP (ref 10.3–14.5)
SODIUM SERPL-SCNC: 133 MMOL/L — LOW (ref 135–145)
SPECIMEN SOURCE: SIGNIFICANT CHANGE UP
TRIGL SERPL-MCNC: 105 MG/DL — SIGNIFICANT CHANGE UP
TSH SERPL-MCNC: 3.27 UU/ML — SIGNIFICANT CHANGE UP (ref 0.34–4.82)
VIT B12 SERPL-MCNC: 901 PG/ML — SIGNIFICANT CHANGE UP (ref 232–1245)
WBC # BLD: 6.33 K/UL — SIGNIFICANT CHANGE UP (ref 3.8–10.5)
WBC # FLD AUTO: 6.33 K/UL — SIGNIFICANT CHANGE UP (ref 3.8–10.5)

## 2021-02-09 PROCEDURE — 76870 US EXAM SCROTUM: CPT | Mod: 26

## 2021-02-09 PROCEDURE — 99232 SBSQ HOSP IP/OBS MODERATE 35: CPT | Mod: GC

## 2021-02-09 RX ORDER — ACETAMINOPHEN 500 MG
650 TABLET ORAL EVERY 6 HOURS
Refills: 0 | Status: DISCONTINUED | OUTPATIENT
Start: 2021-02-09 | End: 2021-02-11

## 2021-02-09 RX ORDER — FOLIC ACID 0.8 MG
1 TABLET ORAL DAILY
Refills: 0 | Status: DISCONTINUED | OUTPATIENT
Start: 2021-02-09 | End: 2021-02-11

## 2021-02-09 RX ORDER — OXYCODONE AND ACETAMINOPHEN 5; 325 MG/1; MG/1
1 TABLET ORAL EVERY 4 HOURS
Refills: 0 | Status: DISCONTINUED | OUTPATIENT
Start: 2021-02-09 | End: 2021-02-11

## 2021-02-09 RX ORDER — ACETAMINOPHEN 500 MG
1000 TABLET ORAL ONCE
Refills: 0 | Status: COMPLETED | OUTPATIENT
Start: 2021-02-09 | End: 2021-02-09

## 2021-02-09 RX ORDER — OXYCODONE AND ACETAMINOPHEN 5; 325 MG/1; MG/1
1 TABLET ORAL EVERY 6 HOURS
Refills: 0 | Status: DISCONTINUED | OUTPATIENT
Start: 2021-02-09 | End: 2021-02-09

## 2021-02-09 RX ORDER — HYDROCORTISONE 1 %
1 OINTMENT (GRAM) TOPICAL
Refills: 0 | Status: DISCONTINUED | OUTPATIENT
Start: 2021-02-09 | End: 2021-02-11

## 2021-02-09 RX ORDER — SENNA PLUS 8.6 MG/1
2 TABLET ORAL AT BEDTIME
Refills: 0 | Status: DISCONTINUED | OUTPATIENT
Start: 2021-02-09 | End: 2021-02-11

## 2021-02-09 RX ORDER — HYDROMORPHONE HYDROCHLORIDE 2 MG/ML
1 INJECTION INTRAMUSCULAR; INTRAVENOUS; SUBCUTANEOUS EVERY 6 HOURS
Refills: 0 | Status: DISCONTINUED | OUTPATIENT
Start: 2021-02-09 | End: 2021-02-11

## 2021-02-09 RX ADMIN — OXYCODONE AND ACETAMINOPHEN 1 TABLET(S): 5; 325 TABLET ORAL at 14:37

## 2021-02-09 RX ADMIN — HYDROMORPHONE HYDROCHLORIDE 1 MILLIGRAM(S): 2 INJECTION INTRAMUSCULAR; INTRAVENOUS; SUBCUTANEOUS at 18:08

## 2021-02-09 RX ADMIN — HEPARIN SODIUM 5000 UNIT(S): 5000 INJECTION INTRAVENOUS; SUBCUTANEOUS at 21:54

## 2021-02-09 RX ADMIN — GABAPENTIN 300 MILLIGRAM(S): 400 CAPSULE ORAL at 21:54

## 2021-02-09 RX ADMIN — SEVELAMER CARBONATE 800 MILLIGRAM(S): 2400 POWDER, FOR SUSPENSION ORAL at 12:52

## 2021-02-09 RX ADMIN — Medication 1 MILLIGRAM(S): at 13:31

## 2021-02-09 RX ADMIN — Medication 50 MILLIGRAM(S): at 17:57

## 2021-02-09 RX ADMIN — SEVELAMER CARBONATE 800 MILLIGRAM(S): 2400 POWDER, FOR SUSPENSION ORAL at 17:57

## 2021-02-09 RX ADMIN — ATORVASTATIN CALCIUM 40 MILLIGRAM(S): 80 TABLET, FILM COATED ORAL at 21:54

## 2021-02-09 RX ADMIN — Medication 50 MILLIGRAM(S): at 06:26

## 2021-02-09 RX ADMIN — Medication 400 MILLIGRAM(S): at 03:50

## 2021-02-09 RX ADMIN — Medication 30 MILLIGRAM(S): at 06:26

## 2021-02-09 RX ADMIN — HEPARIN SODIUM 5000 UNIT(S): 5000 INJECTION INTRAVENOUS; SUBCUTANEOUS at 12:52

## 2021-02-09 RX ADMIN — HEPARIN SODIUM 5000 UNIT(S): 5000 INJECTION INTRAVENOUS; SUBCUTANEOUS at 06:26

## 2021-02-09 RX ADMIN — RISPERIDONE 2 MILLIGRAM(S): 4 TABLET ORAL at 12:52

## 2021-02-09 RX ADMIN — SENNA PLUS 2 TABLET(S): 8.6 TABLET ORAL at 21:54

## 2021-02-09 RX ADMIN — MIRTAZAPINE 15 MILLIGRAM(S): 45 TABLET, ORALLY DISINTEGRATING ORAL at 21:54

## 2021-02-09 RX ADMIN — Medication 1 APPLICATION(S): at 18:35

## 2021-02-09 RX ADMIN — QUETIAPINE FUMARATE 50 MILLIGRAM(S): 200 TABLET, FILM COATED ORAL at 21:54

## 2021-02-09 NOTE — PROGRESS NOTE ADULT - PROBLEM SELECTOR PLAN 2
s./p dialysis on 2/8  Dr La is the nephrologist   He will follow in house for continuation of dialysis - s./p dialysis on 2/8  - Dr La is the nephrologist   - He will follow in house for continuation of dialysis  - Monitor electrolytes and BMP  - Adjust medications

## 2021-02-09 NOTE — PROGRESS NOTE ADULT - PROBLEM SELECTOR PLAN 8
Continue home medications   No indication of involving psych as of now - Continue home medications   - No indication of involving psych as of now

## 2021-02-09 NOTE — PROGRESS NOTE ADULT - PROBLEM SELECTOR PLAN 5
Continue home medications with parameters   Dash diet - Continue home medications with parameters  -  Dash diet  - monitor BP

## 2021-02-09 NOTE — PATIENT PROFILE ADULT - NSPROGENBLOODRESTRICT_GEN_A_NUR
Physician Discharge Summary     Patient ID:  Juancho Arambula  [id number]  89 year old  10/14/1929    Admit date: 6/10/2020    Discharge date and time: 6/12/20    Admitting Physician: Mo Del Toro MD     Discharge Physician: Dr. Del Toro      Admission Diagnoses: Third degree heart block (CMS/[facility]ly [facility]) [I44.2]    Discharge Diagnoses: 1. Symptomatic bradycardia with complete heart block, permanent pacer placed  2. hypertension    Admission Condition: fair    Discharged Condition: good    Indication for Admission: dizziness and presyncope    Hospital Course:   Juancho Arambula is a 89 year old male with history of no significant hx on file who presents for presyncope and weakness that has been present for past 3-4 days. He says that he has not seen a doctor in 10 plus years. In ED HR in the 30s-40s with elevated BP in the 190s. EP consulted and plan is for PPM on 6/11 with Dr. Mejia. He denies chest pain, palpitations, fever, chills, sick contacts, constipation. He is retired . Symptoms started approximately 3 days ago while mowing lawn and have not gotten better. He thought it was due to recent blood donation but his sym ptoms of dizziness never got better. He takes no medications at home        Consults: Dr. Mejia EP    Significant Diagnostic Studies:  .     ECHO:   Normal left ventricular size and wall thickness with no regional wall motion abnormalities. LVEF, 59 %.  Normal right ventricular size and systolic function. Moderate pulmonary hypertension, RVSP 53 mmHg.  Trileaflet aortic valve. Mild aortic valve regurgitation.  Diastolic MR and TR( consistent with 3rd degree heart block).  EP was consulted and a permanent pacer was placed on 6/11/20 without complication. Post procedure CXR showed no pneumothorax. He was hemodynamically stable and ready for discharge to home on 6/12/20.     CXR:   IMPRESSION:     1.  No acute cardiopulmonary findings.     2.  No pneumothorax.      ECG       Treatments:  permanent pacer    Discharge Exam:  Visit Vitals  BP (!) 145/83 (BP Location: LUE - Left upper extremity, Patient Position: Sitting)   Pulse 73   Temp 98.1 °F (36.7 °C) (Oral)   Resp 18   Ht 5' 9\" (1.753 m)   Wt 88.7 kg   SpO2 99%   BMI 28.88 kg/m²     General appearance: alert and no distress  Lungs: clear to auscultation bilaterally  Heart: regular rate and rhythm, S1, S2 normal, no murmur, click, rub or gallop  Extremities: extremities normal, atraumatic, no cyanosis or edema    Disposition: Home    Patient Instructions:   Activity: activity as tolerated and no driving for today  Diet: resume prior diet  Wound Care: keep wound clean and dry    Follow-up with Dr. Mejia in 1 week       Summary of your Discharge Medications      Take these Medications      Details   acetaminophen 325 MG tablet  Commonly known as:  TYLENOL   Take 325 mg by mouth every 4 hours as needed for Pain.     cyanocobalamin 1000 MCG tablet   Take 1,000 mcg by mouth daily.     D3-1000 PO   Take 1 tablet by mouth daily.     MULTIVITAMIN GUMMIES ADULT PO   Take 2 tablets by mouth daily.     OSTEO BI-FLEX JOINT SHIELD PO   Take 2 tablets by mouth daily.     Probiotic-10 Chew Tab   Chew 1 tablet by mouth daily.          Attending:  I attest to having seen and examined the patient with the APNP during bedside rounds and I reviewed her documented note which reflects the services and clinical decision-making I personally provided.  He is doing well without any complaints, denies chest pain and the PPM pocket site seems stable.      Mo Del Toro MD FAC       none

## 2021-02-09 NOTE — PROGRESS NOTE ADULT - PROBLEM SELECTOR PLAN 3
Patient has small skin swelling around 1 cm,   non-erythematous, no discharge  Likely a nodule or lymph node,   Will start bactrim PO  f/u ESR , CRP  f/u usg scrotum - Patient has small skin swelling around 1 cm on scrotum, tender to palpation.   - questionable if on skin, epididymis or vasculature, not on testicle.  - non-erythematous, no discharge  - Will start bactrim PO  - f/u ESR , CRP  - f/u scrotal ultrasound

## 2021-02-09 NOTE — PROGRESS NOTE ADULT - SUBJECTIVE AND OBJECTIVE BOX
PGY-1 Progress Note discussed with attending    PAGER #: [[phone]] TILL 5:00 PM  PLEASE CONTACT ON CALL TEAM:  - On Call Team (Please refer to Janine) FROM 5:00 PM - 8:30PM  - Nightfloat Team FROM 8:30 -7:30 AM    CHIEF COMPLAINT & BRIEF HOSPITAL COURSE:  40 year old man with PMH of DM, ESRD on dialysis, Hypertension, COPD on home oxygen, Bipolar diease and schizophrenia, followed by Dr La closely sent in for AV fistula evaluation post HD 2/8/21 for suspected aneurysm. Patient has been complaining of pain at the AV fistula site for quite sometime and he is sent to hospital for fistula repair on 2/11 and requires medical optimization. He also reports a small scrotal bump/swelling for few days which is painful but not erythematous. He had an abscess drained in the past in same location.       INTERVAL HPI/OVERNIGHT EVENTS:   Patient seen and examined at bedside, he complains of pain in the av fistula site, not controlled by medication. Fistula has good thrill, there is no erythema or discharge. He also endorses pain on the scrotum. Scrotal pain is localized to the right, not radiating, no erythema or discharge.    REVIEW OF SYSTEMS:  CONSTITUTIONAL: No fever, weight loss, or fatigue  RESPIRATORY: No cough, wheezing, chills or hemoptysis; No shortness of breath  CARDIOVASCULAR: No chest pain, palpitations, dizziness, or leg swelling  GASTROINTESTINAL: Mild intermittent abdominal pain. No nausea, vomiting, or hematemesis; No diarrhea or constipation. No melena or hematochezia.  GENITOURINARY: tender nodule on right testicular area.  MUSCULOSKELETAL: Pain on the left arm at the proximal aspect of the AV fistula  NEUROLOGICAL: No headaches, memory loss, loss of strength, numbness, or tremors  SKIN: No itching, burning, rashes, or lesions     MEDICATIONS  (STANDING):  atorvastatin 40 milliGRAM(s) Oral at bedtime  dextrose 50% Injectable 25 Gram(s) IV Push once  folic acid 1 milliGRAM(s) Oral daily  gabapentin 300 milliGRAM(s) Oral at bedtime  glucagon  Injectable 1 milliGRAM(s) IntraMuscular once  heparin   Injectable 5000 Unit(s) SubCutaneous every 8 hours  insulin lispro (ADMELOG) corrective regimen sliding scale   SubCutaneous three times a day before meals  metoprolol tartrate 50 milliGRAM(s) Oral two times a day  mirtazapine 15 milliGRAM(s) Oral at bedtime  NIFEdipine XL 30 milliGRAM(s) Oral daily  QUEtiapine 50 milliGRAM(s) Oral at bedtime  risperiDONE   Tablet 2 milliGRAM(s) Oral daily  senna 2 Tablet(s) Oral at bedtime  sevelamer carbonate 800 milliGRAM(s) Oral three times a day with meals  trimethoprim  160 mG/sulfamethoxazole 800 mG 0.5 Tablet(s) Oral <User Schedule>    MEDICATIONS  (PRN):  ALBUTerol    90 MICROgram(s) HFA Inhaler 2 Puff(s) Inhalation every 6 hours PRN Shortness of Breath and/or Wheezing  oxycodone    5 mG/acetaminophen 325 mG 1 Tablet(s) Oral every 6 hours PRN Severe Pain (7 - 10)      Vital Signs Last 24 Hrs  T(C): 37 (09 Feb 2021 04:52), Max: 37 (09 Feb 2021 04:52)  T(F): 98.6 (09 Feb 2021 04:52), Max: 98.6 (09 Feb 2021 04:52)  HR: 77 (09 Feb 2021 04:52) (77 - 85)  BP: 168/98 (09 Feb 2021 04:52) (150/89 - 168/98)  BP(mean): --  RR: 18 (09 Feb 2021 04:52) (18 - 19)  SpO2: 96% (09 Feb 2021 04:52) (96% - 99%)    PHYSICAL EXAMINATION:  GENERAL: NAD, Obese, with NC in place at 3 L  HEAD:  Atraumatic, Normocephalic  EYES:  conjunctiva and sclera clear, no scleral icterus  NECK: Supple, No JVD, Normal thyroid  CHEST/LUNG: Clear to auscultation.  No rales, rhonchi, wheezing, or rubs  HEART: Regular rate and rhythm; mild systolic murmur appreciated   ABDOMEN: Soft, Nondistended, mild tender to palpation in left joshua umbilical area. Bowel sounds present  NERVOUS SYSTEM:  Alert & Oriented X3,  Strength 5/5 in upper and lower extremities, sensation intact  EXTREMITIES: AV fistula on Left upper arm with good palpable thrill, and audible bruit. no discharge or erythema noted. 2+ Peripheral Pulses, No clubbing, cyanosis, or edema  SKIN: warm dry, multiple scars on AV fistula site.                           11.0   6.33  )-----------( 196      ( 09 Feb 2021 07:37 )             36.7     02-09    133<L>  |  98  |  35<H>  ----------------------------<  98  4.7   |  28  |  7.03<H>    Ca    9.6      09 Feb 2021 07:37  Phos  6.9     02-09  Mg     2.3     02-09    TPro  7.4  /  Alb  3.1<L>  /  TBili  0.4  /  DBili  x   /  AST  11  /  ALT  18  /  AlkPhos  130<H>  02-09    LIVER FUNCTIONS - ( 09 Feb 2021 07:37 )  Alb: 3.1 g/dL / Pro: 7.4 g/dL / ALK PHOS: 130 U/L / ALT: 18 U/L DA / AST: 11 U/L / GGT: x                 I&O's Summary    09 Feb 2021 07:01  -  09 Feb 2021 12:38  --------------------------------------------------------  IN: 118 mL / OUT: 0 mL / NET: 118 mL          RADIOLOGY & ADDITIONAL TESTS:

## 2021-02-09 NOTE — PROGRESS NOTE ADULT - PROBLEM SELECTOR PLAN 1
Patient has left arm fistula, with palpable bruit   Increased pain for sometime, no improvement  Difficulty with dialysis  Suspected Aneurysm  Plan to revise on 2/11 by Dr Foote  No indication of antibiotics for now   - Pain control with tylenol and percocet   Pre op monitoring   RCRI score: 2 , Class III risk, patient has intermediate risk for post operative complications   - Echo from December shows normal cardiac function, however ,patient is morbidly obese   - Vascular follow up Dr Foote - Patient has left arm fistula, with palpable thrill  - Increased pain for sometime, no improvement  - Difficulty with dialysis  - Suspected Aneurysm  - Plan to revise on 2/11 by Dr Foote  - No indication of antibiotics for now   - Pain control with tylenol and percocet   - Pre op monitoring   - RCRI score: 2 , Class III risk, patient has intermediate risk for post operative complications   - Echo from December shows normal cardiac function, however ,patient is morbidly obese   - will get cardiac clearance: Dr. Reyes  - Patient will get permacath by vascular surgery at same time of av fistula revision.  - Vascular follow up Dr Foote

## 2021-02-09 NOTE — PROGRESS NOTE ADULT - PROBLEM SELECTOR PLAN 6
Patient reports he has not denzel using his insulin as sugars runs around 90  Will start sliding scale for now  Diabetic diet   A1C from december 4.6 - Patient reports he has not denzel using his insulin as sugars runs around 90  - Will start sliding scale for now  - Diabetic diet   - A1C from december 4.6

## 2021-02-09 NOTE — SBIRT NOTE ADULT - NSSBIRTDRGBRIEFINTDET_GEN_A_CORE
Education reg: substance use provided; patient declined to accept any resources and declined the need for SW intervention.

## 2021-02-09 NOTE — PROGRESS NOTE ADULT - SUBJECTIVE AND OBJECTIVE BOX
Discussed case with Dr Foote, Vascular attending. Pt is well known to vascular service. Planned for revision of avf as OP, however will plan for inpatient revision of avf and permacath placement this week thursday.  Please indicate pt medically cleared/optimized from medical and Cardiology point of view in preparation for Thursday OR as may be available.

## 2021-02-09 NOTE — PROGRESS NOTE ADULT - PROBLEM SELECTOR PLAN 4
Patient is non compliant, he uses inhaler irregularly   Currently not in any exacerbation   Will continue albuterol inhaler PRN - Patient is non compliant, he uses inhaler irregularly   - Currently not in any exacerbation   - Will continue albuterol inhaler PRN

## 2021-02-10 ENCOUNTER — TRANSCRIPTION ENCOUNTER (OUTPATIENT)
Age: 41
End: 2021-02-10

## 2021-02-10 DIAGNOSIS — R21 RASH AND OTHER NONSPECIFIC SKIN ERUPTION: ICD-10-CM

## 2021-02-10 LAB
ALBUMIN SERPL ELPH-MCNC: 3 G/DL — LOW (ref 3.5–5)
ALP SERPL-CCNC: 124 U/L — HIGH (ref 40–120)
ALT FLD-CCNC: 19 U/L DA — SIGNIFICANT CHANGE UP (ref 10–60)
ANION GAP SERPL CALC-SCNC: 11 MMOL/L — SIGNIFICANT CHANGE UP (ref 5–17)
AST SERPL-CCNC: 9 U/L — LOW (ref 10–40)
BILIRUB SERPL-MCNC: 0.4 MG/DL — SIGNIFICANT CHANGE UP (ref 0.2–1.2)
BUN SERPL-MCNC: 55 MG/DL — HIGH (ref 7–18)
CALCIUM SERPL-MCNC: 9.5 MG/DL — SIGNIFICANT CHANGE UP (ref 8.4–10.5)
CHLORIDE SERPL-SCNC: 93 MMOL/L — LOW (ref 96–108)
CO2 SERPL-SCNC: 27 MMOL/L — SIGNIFICANT CHANGE UP (ref 22–31)
CREAT SERPL-MCNC: 9.24 MG/DL — HIGH (ref 0.5–1.3)
GLUCOSE BLDC GLUCOMTR-MCNC: 108 MG/DL — HIGH (ref 70–99)
GLUCOSE BLDC GLUCOMTR-MCNC: 111 MG/DL — HIGH (ref 70–99)
GLUCOSE BLDC GLUCOMTR-MCNC: 90 MG/DL — SIGNIFICANT CHANGE UP (ref 70–99)
GLUCOSE BLDC GLUCOMTR-MCNC: 99 MG/DL — SIGNIFICANT CHANGE UP (ref 70–99)
GLUCOSE SERPL-MCNC: 102 MG/DL — HIGH (ref 70–99)
HBV SURFACE AB SER-ACNC: REACTIVE
HBV SURFACE AG SER-ACNC: SIGNIFICANT CHANGE UP
HCT VFR BLD CALC: 37 % — LOW (ref 39–50)
HGB BLD-MCNC: 11.1 G/DL — LOW (ref 13–17)
MAGNESIUM SERPL-MCNC: 2.3 MG/DL — SIGNIFICANT CHANGE UP (ref 1.6–2.6)
MCHC RBC-ENTMCNC: 28.3 PG — SIGNIFICANT CHANGE UP (ref 27–34)
MCHC RBC-ENTMCNC: 30 GM/DL — LOW (ref 32–36)
MCV RBC AUTO: 94.4 FL — SIGNIFICANT CHANGE UP (ref 80–100)
NRBC # BLD: 0 /100 WBCS — SIGNIFICANT CHANGE UP (ref 0–0)
PHOSPHATE SERPL-MCNC: 8.5 MG/DL — HIGH (ref 2.5–4.5)
PLATELET # BLD AUTO: 211 K/UL — SIGNIFICANT CHANGE UP (ref 150–400)
POTASSIUM SERPL-MCNC: 5.8 MMOL/L — HIGH (ref 3.5–5.3)
POTASSIUM SERPL-SCNC: 5.8 MMOL/L — HIGH (ref 3.5–5.3)
PROT SERPL-MCNC: 7.5 G/DL — SIGNIFICANT CHANGE UP (ref 6–8.3)
RBC # BLD: 3.92 M/UL — LOW (ref 4.2–5.8)
RBC # FLD: 14.3 % — SIGNIFICANT CHANGE UP (ref 10.3–14.5)
SODIUM SERPL-SCNC: 131 MMOL/L — LOW (ref 135–145)
WBC # BLD: 7.38 K/UL — SIGNIFICANT CHANGE UP (ref 3.8–10.5)
WBC # FLD AUTO: 7.38 K/UL — SIGNIFICANT CHANGE UP (ref 3.8–10.5)

## 2021-02-10 PROCEDURE — 99232 SBSQ HOSP IP/OBS MODERATE 35: CPT | Mod: GC

## 2021-02-10 PROCEDURE — 99223 1ST HOSP IP/OBS HIGH 75: CPT

## 2021-02-10 RX ORDER — ONDANSETRON 8 MG/1
4 TABLET, FILM COATED ORAL EVERY 8 HOURS
Refills: 0 | Status: DISCONTINUED | OUTPATIENT
Start: 2021-02-10 | End: 2021-02-11

## 2021-02-10 RX ADMIN — Medication 30 MILLIGRAM(S): at 06:26

## 2021-02-10 RX ADMIN — RISPERIDONE 2 MILLIGRAM(S): 4 TABLET ORAL at 12:09

## 2021-02-10 RX ADMIN — Medication 50 MILLIGRAM(S): at 06:26

## 2021-02-10 RX ADMIN — Medication 1 APPLICATION(S): at 06:28

## 2021-02-10 RX ADMIN — QUETIAPINE FUMARATE 50 MILLIGRAM(S): 200 TABLET, FILM COATED ORAL at 22:29

## 2021-02-10 RX ADMIN — SEVELAMER CARBONATE 800 MILLIGRAM(S): 2400 POWDER, FOR SUSPENSION ORAL at 12:09

## 2021-02-10 RX ADMIN — HYDROMORPHONE HYDROCHLORIDE 1 MILLIGRAM(S): 2 INJECTION INTRAMUSCULAR; INTRAVENOUS; SUBCUTANEOUS at 22:29

## 2021-02-10 RX ADMIN — HEPARIN SODIUM 5000 UNIT(S): 5000 INJECTION INTRAVENOUS; SUBCUTANEOUS at 06:25

## 2021-02-10 RX ADMIN — HYDROMORPHONE HYDROCHLORIDE 1 MILLIGRAM(S): 2 INJECTION INTRAMUSCULAR; INTRAVENOUS; SUBCUTANEOUS at 00:19

## 2021-02-10 RX ADMIN — SEVELAMER CARBONATE 800 MILLIGRAM(S): 2400 POWDER, FOR SUSPENSION ORAL at 08:34

## 2021-02-10 RX ADMIN — MIRTAZAPINE 15 MILLIGRAM(S): 45 TABLET, ORALLY DISINTEGRATING ORAL at 22:28

## 2021-02-10 RX ADMIN — Medication 1 APPLICATION(S): at 17:17

## 2021-02-10 RX ADMIN — GABAPENTIN 300 MILLIGRAM(S): 400 CAPSULE ORAL at 22:28

## 2021-02-10 RX ADMIN — ONDANSETRON 4 MILLIGRAM(S): 8 TABLET, FILM COATED ORAL at 18:52

## 2021-02-10 RX ADMIN — Medication 1 MILLIGRAM(S): at 12:09

## 2021-02-10 RX ADMIN — ATORVASTATIN CALCIUM 40 MILLIGRAM(S): 80 TABLET, FILM COATED ORAL at 22:28

## 2021-02-10 RX ADMIN — SEVELAMER CARBONATE 800 MILLIGRAM(S): 2400 POWDER, FOR SUSPENSION ORAL at 17:16

## 2021-02-10 RX ADMIN — Medication 50 MILLIGRAM(S): at 17:16

## 2021-02-10 NOTE — PROGRESS NOTE ADULT - PROBLEM SELECTOR PLAN 5
- Continue home medications with parameters  -  Dash diet  - monitor BP - Patient is non compliant, he uses inhaler irregularly   - Currently not in any exacerbation   - Will continue albuterol inhaler PRN  - Pulmonology consult prior to Surgery: Dr. Nielson

## 2021-02-10 NOTE — PROGRESS NOTE ADULT - PROBLEM SELECTOR PLAN 7
- Continue home medications   - No indication of involving psych as of now - Patient reports he has not denzel using his insulin as sugars runs around 90  - Will start sliding scale for now  - Diabetic diet   - A1C from december 4.6

## 2021-02-10 NOTE — PROGRESS NOTE ADULT - SUBJECTIVE AND OBJECTIVE BOX
[facility] : Progress Note :: [phone], (office [phone]),   Dr La / Dr Hui / Dr Barber / Dr Villalobos / Dr Hang CASTELLANO / Dr Mehta / Dr Sanchez / Dr Alber dumont  _____________________________________________________________________________________________    HD today.    aspirin (Swelling)  fish (Unknown)  penicillin (Rash)  penicillins (Swelling)  shellfish (Rash)  shellfish (Unknown)    Hospital Medications:   MEDICATIONS  (STANDING):  atorvastatin 40 milliGRAM(s) Oral at bedtime  dextrose 50% Injectable 25 Gram(s) IV Push once  folic acid 1 milliGRAM(s) Oral daily  gabapentin 300 milliGRAM(s) Oral at bedtime  glucagon  Injectable 1 milliGRAM(s) IntraMuscular once  hydrocortisone 1% Cream 1 Application(s) Topical two times a day  insulin lispro (ADMELOG) corrective regimen sliding scale   SubCutaneous three times a day before meals  metoprolol tartrate 50 milliGRAM(s) Oral two times a day  mirtazapine 15 milliGRAM(s) Oral at bedtime  NIFEdipine XL 30 milliGRAM(s) Oral daily  QUEtiapine 50 milliGRAM(s) Oral at bedtime  risperiDONE   Tablet 2 milliGRAM(s) Oral daily  senna 2 Tablet(s) Oral at bedtime  sevelamer carbonate 800 milliGRAM(s) Oral three times a day with meals        VITALS:  T(F): 97.6 (02-10-21 @ 13:42), Max: 98.5 (21 @ 21:51)  HR: 67 (02-10-21 @ 17:18)  BP: 164/92 (02-10-21 @ 17:18)  RR: 18 (02-10-21 @ 13:42)  SpO2: 100% (02-10-21 @ 13:42)  Wt(kg): --     @ 07:01  -  02-10 @ 07:00  --------------------------------------------------------  IN: 236 mL / OUT: 0 mL / NET: 236 mL        PHYSICAL EXAM:  Constitutional: NAD  HEENT: anicteric sclera, oropharynx clear.  Neck: No JVD  Respiratory: CTAB, no wheezes, rales or rhonchi  Cardiovascular: S1, S2, RRR  Gastrointestinal: BS+, soft, NT/ND  Extremities:  No peripheral edema  Neurological: A/O x 3, no focal deficits  Vascular Access: LUEAVF with aneurysms.    LABS:  02-10    131<L>  |  93<L>  |  55<H>  ----------------------------<  102<H>  5.8<H>   |  27  |  9.24<H>    Ca    9.5      10 Feb 2021 11:06  Phos  8.5     02-10  Mg     2.3     02-10    TPro  7.5  /  Alb  3.0<L>  /  TBili  0.4  /  DBili      /  AST  9<L>  /  ALT  19  /  AlkPhos  124<H>  02-10    Creatinine Trend: 9.24 <--, 7.03 <--, 6.07 <--                        11.1   7.38  )-----------( 211      ( 10 Feb 2021 11:06 )             37.0     Urine Studies:  Urinalysis Basic - ( 2021 19:15 )    Color: Yellow / Appearance: Clear / S.010 / pH:   Gluc:  / Ketone: Negative  / Bili: Negative / Urobili: Negative   Blood:  / Protein: 500 mg/dL / Nitrite: Negative   Leuk Esterase: Negative / RBC: 2-5 /HPF / WBC 0-2 /HPF   Sq Epi:  / Non Sq Epi: Occasional /HPF / Bacteria: Trace /HPF        RADIOLOGY & ADDITIONAL STUDIES:

## 2021-02-10 NOTE — PROGRESS NOTE ADULT - PROBLEM SELECTOR PLAN 10
- pt has pruritic, flaking rash on LUE and RLE in pattern of red tattoo dye  - given hydrocortisone 1% cream  - apply topically 2x a day RISK                                                          Points  [] Previous VTE                                           3  [] Thrombophilia                                        2  [] Lower limb paralysis                              2   [] Current Cancer                                       2   [x] Immobilization > 24 hrs                        1  [] ICU/CCU stay > 24 hours                       1  [x] Age > 60                                                   1    Heparin 5000U SQ Q8

## 2021-02-10 NOTE — PROGRESS NOTE ADULT - PROBLEM SELECTOR PLAN 3
- Patient has small skin swelling around 1 cm on scrotum, tender to palpation.   - questionable if on skin, epididymis or vasculature, not on testicle.  - non-erythematous, no discharge  - Will start bactrim PO  - ESR elevated at 27   - f/u CRP  - scrotal US shows microlithiasis, needs urologic follow up and repeat US 6-12 mo outpatient - pt has pruritic, flaking rash on LUE and RLE in pattern of red tattoo dye  - given hydrocortisone 1% cream  - apply topically 2x a day

## 2021-02-10 NOTE — CONSULT NOTE ADULT - SUBJECTIVE AND OBJECTIVE BOX
[facility] : Progress Note :: [phone], (office [phone]),   Dr La / Dr Hui / Dr Barber / Dr Villalobos / Dr Hang CASTELLANO / Dr Mehta / Dr Sanchez / Dr Alber dumont  _____________________________________________________________________________________________  Patient is a 40y Male whom presented to the hospital with LT arm pain  He has ESRD on HD via a LUEAVF . Presented to ED with painful AVF.  Follows with vascular surgery- s/p stents placement in the outpatient setting.  In ED complained of a scrotal 'csyt" as well- prescribed oral antibiotics  Plans for AVF revision on Thursday and placement of permacath.  c/o itchy rash on RLE and LUE    PAST MEDICAL & SURGICAL HISTORY:  Anxiety    Schizophrenia    Hypertension    Diabetes mellitus, type 2    Asthma with COPD    Renal failure    COPD (chronic obstructive pulmonary disease)    Migraine    HTN (hypertension)    DM (diabetes mellitus)    Bipolar disorder    ESRD on dialysis    Morbid obesity with BMI of 40.0-44.9, adult    S/P arteriovenous (AV) fistula creation    H/O hernia repair      aspirin (Swelling)  fish (Unknown)  penicillin (Rash)  penicillins (Swelling)  shellfish (Rash)  shellfish (Unknown)    Home Medications Reviewed  Hospital Medications:   MEDICATIONS  (STANDING):  atorvastatin 40 milliGRAM(s) Oral at bedtime  dextrose 50% Injectable 25 Gram(s) IV Push once  folic acid 1 milliGRAM(s) Oral daily  gabapentin 300 milliGRAM(s) Oral at bedtime  glucagon  Injectable 1 milliGRAM(s) IntraMuscular once  heparin   Injectable 5000 Unit(s) SubCutaneous every 8 hours  insulin lispro (ADMELOG) corrective regimen sliding scale   SubCutaneous three times a day before meals  metoprolol tartrate 50 milliGRAM(s) Oral two times a day  mirtazapine 15 milliGRAM(s) Oral at bedtime  NIFEdipine XL 30 milliGRAM(s) Oral daily  QUEtiapine 50 milliGRAM(s) Oral at bedtime  risperiDONE   Tablet 2 milliGRAM(s) Oral daily  senna 2 Tablet(s) Oral at bedtime  sevelamer carbonate 800 milliGRAM(s) Oral three times a day with meals  trimethoprim  160 mG/sulfamethoxazole 800 mG 0.5 Tablet(s) Oral <User Schedule>    SOCIAL HISTORY:  Denies ETOh,Smoking,   FAMILY HISTORY:        VITALS:  T(F): 97.6 (21 @ 14:02), Max: 98.6 (21 @ 04:52)  HR: 65 (21 @ 14:02)  BP: 149/85 (21 @ 14:02)  RR: 18 (21 @ 14:02)  SpO2: 95% (21 @ 14:02)  Wt(kg): --     @ 07:01  -   @ 16:56  --------------------------------------------------------  IN: 236 mL / OUT: 0 mL / NET: 236 mL      Height (cm): 200.7 ( @ 17:34)  Weight (kg): 171.5 ( @ 00:52)  BMI (kg/m2): 42.6 ( @ 00:52)  BSA (m2): 2.99 ( @ 00:52)    PHYSICAL EXAM:  Constitutional: NAD  HEENT: anicteric sclera, oropharynx clear.  Neck: No JVD  Respiratory: CTAB, no wheezes, rales or rhonchi  Cardiovascular: S1, S2, RRR  Gastrointestinal: BS+, soft, NT/ND  Extremities: No peripheral edema. pruritic rash RT leg and LT upper extremity  Neurological: A/O x 3, no focal deficits  : No CVA tenderness. No hernandez.   Skin: No rashes  Vascular Access: LUEAVF with thrill  and bruit.     LABS:      133<L>  |  98  |  35<H>  ----------------------------<  98  4.7   |  28  |  7.03<H>    Ca    9.6      2021 07:37  Phos  6.9     02-09  Mg     2.3     02-09    TPro  7.4  /  Alb  3.1<L>  /  TBili  0.4  /  DBili      /  AST  11  /  ALT  18  /  AlkPhos  130<H>  02-    Creatinine Trend: 7.03 <--, 6.07 <--                        11.0   6.33  )-----------( 196      ( 2021 07:37 )             36.7     Urine Studies:  Urinalysis Basic - ( 2021 19:15 )    Color: Yellow / Appearance: Clear / S.010 / pH:   Gluc:  / Ketone: Negative  / Bili: Negative / Urobili: Negative   Blood:  / Protein: 500 mg/dL / Nitrite: Negative   Leuk Esterase: Negative / RBC: 2-5 /HPF / WBC 0-2 /HPF   Sq Epi:  / Non Sq Epi: Occasional /HPF / Bacteria: Trace /HPF        RADIOLOGY & ADDITIONAL STUDIES:                
    CHIEF COMPLAINT: preop evaluation    HPI:HPI:    40M former smoker, obese, DM, ESRD, admitted for AV fistula evaluation and placement. Pt quit smoking 1 year ago after he was admitted to [facility] with difficulty breathing. Since then, he was placed on symbicort and spiriva and has been stable. He requires O2 supplementation  and uses day and night. Reports sleep study last year which was negative for KRYSTIN. Reports stable weight. NO leg swelling, no orthopnea. No chronic cough, no wheezing. Mother has asthma. No prednisone use. Trigger is cold air and pollen. Today feels well from respiratory standpoint.    Vital Signs Last 24 Hrs  T(C): 36.4 (10 Feb 2021 13:42), Max: 36.9 (09 Feb 2021 21:51)  T(F): 97.6 (10 Feb 2021 13:42), Max: 98.5 (09 Feb 2021 21:51)  HR: 64 (10 Feb 2021 13:42) (62 - 78)  BP: 144/83 (10 Feb 2021 13:42) (143/81 - 157/88)  BP(mean): --  RR: 18 (10 Feb 2021 13:42) (18 - 18)  SpO2: 100% (10 Feb 2021 13:42) (96% - 100%)      PAST MEDICAL & SURGICAL HISTORY:  Anxiety    Schizophrenia    Hypertension    Diabetes mellitus, type 2    Asthma with COPD    Renal failure    COPD (chronic obstructive pulmonary disease)    Migraine    HTN (hypertension)    DM (diabetes mellitus)    Bipolar disorder    ESRD on dialysis    Morbid obesity with BMI of 40.0-44.9, adult    S/P arteriovenous (AV) fistula creation    H/O hernia repair        MEDICATIONS  (STANDING):  atorvastatin 40 milliGRAM(s) Oral at bedtime  dextrose 50% Injectable 25 Gram(s) IV Push once  folic acid 1 milliGRAM(s) Oral daily  gabapentin 300 milliGRAM(s) Oral at bedtime  glucagon  Injectable 1 milliGRAM(s) IntraMuscular once  heparin   Injectable 5000 Unit(s) SubCutaneous every 8 hours  hydrocortisone 1% Cream 1 Application(s) Topical two times a day  insulin lispro (ADMELOG) corrective regimen sliding scale   SubCutaneous three times a day before meals  metoprolol tartrate 50 milliGRAM(s) Oral two times a day  mirtazapine 15 milliGRAM(s) Oral at bedtime  NIFEdipine XL 30 milliGRAM(s) Oral daily  QUEtiapine 50 milliGRAM(s) Oral at bedtime  risperiDONE   Tablet 2 milliGRAM(s) Oral daily  senna 2 Tablet(s) Oral at bedtime  sevelamer carbonate 800 milliGRAM(s) Oral three times a day with meals  trimethoprim  160 mG/sulfamethoxazole 800 mG 0.5 Tablet(s) Oral <User Schedule>    MEDICATIONS  (PRN):  acetaminophen   Tablet .. 650 milliGRAM(s) Oral every 6 hours PRN Temp greater or equal to 38C (100.4F), Mild Pain (1 - 3)  ALBUTerol    90 MICROgram(s) HFA Inhaler 2 Puff(s) Inhalation every 6 hours PRN Shortness of Breath and/or Wheezing  HYDROmorphone   Tablet 1 milliGRAM(s) Oral every 6 hours PRN Severe Pain (7 - 10)  oxycodone    5 mG/acetaminophen 325 mG 1 Tablet(s) Oral every 4 hours PRN Moderate Pain (4 - 6)      FAMILY HISTORY:    No family history of premature coronary artery disease or sudden cardiac death    SOCIAL HISTORY:  Smoking-[ ] Active  [ ] Former [ ] Non Smoker  Alcohol-[ ] Denies [ ] Social [ ] Daily  Ilicit Drug use-[ ] Denies [ ] Active user    REVIEW OF SYSTEMS:  Constitutional: [ ] fever, [ ]weight loss, [ ]fatigue   Activity [ ] Bedbound,[ ] Ambulates [ ] Unassisted[ ] Cane/Walker [ ] Assistence.  Effort tolerance:[ ] Excellent [ ] Good [ ] Fair [ ] Poor [ ]  Eyes: [ ] visual changes  Respiratory: [ ]shortness of breath;  [ ] cough, [ ]wheezing, [ ]chills, [ ]hemoptysis  Cardiovascular: [ ] chest pain, [ ]palpitations, [ ]dizziness,  [ ]leg swelling[ ]orthopnea [ ]PND  Gastrointestinal: [ ] abdominal pain, [ ]nausea, [ ]vomiting,  [ ]diarrhea,[ ]constipation  Genitourinary: [ ] dysuria, [ ] hematuria  Neurologic: [ ] headaches [ ] tremors[ ] weakness  Skin: [ ] itching, [ ]burning, [ ] rashes  Endocrine: [ ] heat or cold intolerance  Musculoskeletal: [ ] joint pain or swelling; [ ] muscle, back, or extremity pain  Psychiatric: [ ] depression, [ ]anxiety, [ ]mood swings, or [ ]difficulty sleeping  Hematologic: [ ] easy bruising, [ ] bleeding gums       [ x] All others negative	  [ ] Unable to obtain    Vital Signs Last 24 Hrs  T(C): 36.4 (10 Feb 2021 13:42), Max: 36.9 (09 Feb 2021 21:51)  T(F): 97.6 (10 Feb 2021 13:42), Max: 98.5 (09 Feb 2021 21:51)  HR: 64 (10 Feb 2021 13:42) (62 - 78)  BP: 144/83 (10 Feb 2021 13:42) (143/81 - 157/88)  BP(mean): --  RR: 18 (10 Feb 2021 13:42) (18 - 18)  SpO2: 100% (10 Feb 2021 13:42) (96% - 100%)        PHYSICAL EXAM:  General: No acute distress BMI-  HEENT: EOMI, PERRL[ ] Icteric  Neck: Supple, No JVD  Lungs: Equal air entry bilaterally; [ ] Rales [ ] Rhonchi [ ] Wheezing  Heart: Regular rate and rhythm;[ ] Murmurs-   /6 [ ] Systolic [ ] Diastolic [ ] Radiation,No rubs, or gallops  Abdomen: Nontender, bowel sounds present  Extremities: No clubbing, cyanosis, or edema[ ] Calf tenderness  Nervous system:  Alert & Oriented X3, no focal deficits  Psychiatric: Normal affect  Skin: No rashes or lesions      LABS:                        11.1   7.38  )-----------( 211      ( 10 Feb 2021 11:06 )             37.0   02-10    131<L>  |  93<L>  |  55<H>  ----------------------------<  102<H>  5.8<H>   |  27  |  9.24<H>    Ca    9.5      10 Feb 2021 11:06  Phos  8.5     02-10  Mg     2.3     02-10    TPro  7.5  /  Alb  3.0<L>  /  TBili  0.4  /  DBili  x   /  AST  9<L>  /  ALT  19  /  AlkPhos  124<H>  02-10  
DATE OF SERVICE:    Patient was seen,examined and evaluated  by me.ER evaluation, Labs and Hospital course was reviewed,    CHIEF COMPLAINT:    HPI:HPI:  40 year old man with PMH of DM, ESRD on dialysis, Hypertension, COPD, Bipolar diease and schizophrenia, followed by Dr La closely sent in for AV fistula evaluation post HD today for suspected aneurysm. Patient is not a good historian and most history is obtained from ED provider note and medical charts. Patient has been complaining of pain at the AV fistula site for quite sometime and he is sent to hospital for fistula repair on 2/11 and requires medical optimization.   He also reports a small scrotal bump/swelling for few days which is painful but not erythematous.   Patient denies chest pain, SOB, dysuria, hematuria, scrotal discharge, diarrhea, constipation.     ED Course:  Cr: 6.07, BUN : 25 (post dialysis)  1 dose of Bactrim and 1 Percocet     Vital Signs Last 24 Hrs  T(C): 36.8 (08 Feb 2021 19:10), Max: 36.8 (08 Feb 2021 19:10)  T(F): 98.2 (08 Feb 2021 19:10), Max: 98.2 (08 Feb 2021 19:10)  HR: 85 (08 Feb 2021 19:10) (81 - 85)  BP: 156/89 (08 Feb 2021 19:10) (150/89 - 156/89)  RR: 19 (08 Feb 2021 19:10) (19 - 19)  SpO2: 98% (08 Feb 2021 19:10) (98% - 99%) (08 Feb 2021 20:26)      PAST MEDICAL & SURGICAL HISTORY:  Anxiety    Schizophrenia    Hypertension    Diabetes mellitus, type 2    Asthma with COPD    Renal failure    COPD (chronic obstructive pulmonary disease)    Migraine    HTN (hypertension)    DM (diabetes mellitus)    Bipolar disorder    ESRD on dialysis    Morbid obesity with BMI of 40.0-44.9, adult    S/P arteriovenous (AV) fistula creation    H/O hernia repair        MEDICATIONS  (STANDING):  atorvastatin 40 milliGRAM(s) Oral at bedtime  dextrose 50% Injectable 25 Gram(s) IV Push once  folic acid 1 milliGRAM(s) Oral daily  gabapentin 300 milliGRAM(s) Oral at bedtime  glucagon  Injectable 1 milliGRAM(s) IntraMuscular once  heparin   Injectable 5000 Unit(s) SubCutaneous every 8 hours  hydrocortisone 1% Cream 1 Application(s) Topical two times a day  insulin lispro (ADMELOG) corrective regimen sliding scale   SubCutaneous three times a day before meals  metoprolol tartrate 50 milliGRAM(s) Oral two times a day  mirtazapine 15 milliGRAM(s) Oral at bedtime  NIFEdipine XL 30 milliGRAM(s) Oral daily  QUEtiapine 50 milliGRAM(s) Oral at bedtime  risperiDONE   Tablet 2 milliGRAM(s) Oral daily  senna 2 Tablet(s) Oral at bedtime  sevelamer carbonate 800 milliGRAM(s) Oral three times a day with meals  trimethoprim  160 mG/sulfamethoxazole 800 mG 0.5 Tablet(s) Oral <User Schedule>    MEDICATIONS  (PRN):  acetaminophen   Tablet .. 650 milliGRAM(s) Oral every 6 hours PRN Temp greater or equal to 38C (100.4F), Mild Pain (1 - 3)  ALBUTerol    90 MICROgram(s) HFA Inhaler 2 Puff(s) Inhalation every 6 hours PRN Shortness of Breath and/or Wheezing  HYDROmorphone   Tablet 1 milliGRAM(s) Oral every 6 hours PRN Severe Pain (7 - 10)  oxycodone    5 mG/acetaminophen 325 mG 1 Tablet(s) Oral every 4 hours PRN Moderate Pain (4 - 6)      FAMILY HISTORY:    No family history of premature coronary artery disease or sudden cardiac death    SOCIAL HISTORY:  Smoking-[ ] Active  [ ] Former [ ] Non Smoker  Alcohol-[ ] Denies [ ] Social [ ] Daily  Ilicit Drug use-[ ] Denies [ ] Active user    REVIEW OF SYSTEMS:  Constitutional: [ ] fever, [ ]weight loss, [ ]fatigue   Activity [ ] Bedbound,[ ] Ambulates [ ] Unassisted[ ] Cane/Walker [ ] Assistence.  Effort tolerance:[ ] Excellent [ ] Good [ ] Fair [ ] Poor [ ]  Eyes: [ ] visual changes  Respiratory: [ ]shortness of breath;  [ ] cough, [ ]wheezing, [ ]chills, [ ]hemoptysis  Cardiovascular: [ ] chest pain, [ ]palpitations, [ ]dizziness,  [ ]leg swelling[ ]orthopnea [ ]PND  Gastrointestinal: [ ] abdominal pain, [ ]nausea, [ ]vomiting,  [ ]diarrhea,[ ]constipation  Genitourinary: [ ] dysuria, [ ] hematuria  Neurologic: [ ] headaches [ ] tremors[ ] weakness  Skin: [ ] itching, [ ]burning, [ ] rashes  Endocrine: [ ] heat or cold intolerance  Musculoskeletal: [ ] joint pain or swelling; [ ] muscle, back, or extremity pain  Psychiatric: [ ] depression, [ ]anxiety, [ ]mood swings, or [ ]difficulty sleeping  Hematologic: [ ] easy bruising, [ ] bleeding gums       [ x] All others negative	  [ ] Unable to obtain    Vital Signs Last 24 Hrs  T(C): 36.9 (10 Feb 2021 04:52), Max: 36.9 (09 Feb 2021 21:51)  T(F): 98.5 (10 Feb 2021 04:52), Max: 98.5 (09 Feb 2021 21:51)  HR: 62 (10 Feb 2021 04:52) (62 - 78)  BP: 143/81 (10 Feb 2021 04:52) (143/81 - 157/88)  BP(mean): --  RR: 18 (10 Feb 2021 04:52) (18 - 18)  SpO2: 99% (10 Feb 2021 04:52) (95% - 99%)  I&O's Summary    09 Feb 2021 07:01  -  10 Feb 2021 07:00  --------------------------------------------------------  IN: 236 mL / OUT: 0 mL / NET: 236 mL        PHYSICAL EXAM:  General: No acute distress BMI-  HEENT: EOMI, PERRL[ ] Icteric  Neck: Supple, No JVD  Lungs: Equal air entry bilaterally; [ ] Rales [ ] Rhonchi [ ] Wheezing  Heart: Regular rate and rhythm;[ ] Murmurs-   /6 [ ] Systolic [ ] Diastolic [ ] Radiation,No rubs, or gallops  Abdomen: Nontender, bowel sounds present  Extremities: No clubbing, cyanosis, or edema[ ] Calf tenderness  Nervous system:  Alert & Oriented X3, no focal deficits  Psychiatric: Normal affect  Skin: No rashes or lesions      LABS:  02-09    133<L>  |  98  |  35<H>  ----------------------------<  98  4.7   |  28  |  7.03<H>    Ca    9.6      09 Feb 2021 07:37  Phos  6.9     02-09  Mg     2.3     02-09    TPro  7.4  /  Alb  3.1<L>  /  TBili  0.4  /  DBili  x   /  AST  11  /  ALT  18  /  AlkPhos  130<H>  02-09    Creatinine Trend: 7.03<--, 6.07<--                        11.0   6.33  )-----------( 196      ( 09 Feb 2021 07:37 )             36.7         Lipid Panel:   Cardiac Enzymes:           RADIOLOGY:    ECG [my interpretation]:    TELEMETRY:    ECHO:    STRESS TEST:    CATHETERIZATION:

## 2021-02-10 NOTE — PROGRESS NOTE ADULT - PROBLEM SELECTOR PLAN 4
- Patient is non compliant, he uses inhaler irregularly   - Currently not in any exacerbation   - Will continue albuterol inhaler PRN - Patient has small skin swelling around 1 cm on scrotum, tender to palpation.   - questionable if on skin, epididymis or vasculature, not on testicle.  - non-erythematous, no discharge  - Will start bactrim PO  - ESR elevated at 27   - scrotal US shows microlithiasis, needs urologic follow up and repeat US 6-12 mo outpatient  - continue pain management

## 2021-02-10 NOTE — PROGRESS NOTE ADULT - ASSESSMENT
40y.o. Male with L AVF malfunction    -HD today  -Check electrolytes in AM  -OR 2/11/2021  -NPO after midnight except PO meds  -Gentle IV hydration when NPO  -Chlorhexidine wipes  -DVT ppx

## 2021-02-10 NOTE — CONSULT NOTE ADULT - ASSESSMENT
Patient is a 40y Male whom presented to the hospital with LT arm pain  He has ESRD on HD via a LUEAVF . Presented to ED with painful AVF.  Follows with vascular surgery- s/p stents placement in the outpatient setting.  In ED complained of a scrotal 'csyt" as well- prescribed oral antibiotics  Plans for AVF revision on Thursday and placement of permacath.  c/o itchy rash on RLE and LUE.    # ESRD. HD later in the day tommorrow to optimize for surgery on Thursday.  # anemia of CKD. HGB stable  # lytes/acid base at goal  # pruritic rash RLE and LUE. chronically elevated phos. cont sevelamer steroid cream for symptomatic relief
40M morbid obesity, ESRD, DM, admitted for AV fistula placement. Unclear h/o COPD, clinically stable on inhalers. On supplemental O2 but resting O2 94% and improves with deep inspiration. Suspect obesity related chest wall restriction as his main pulmonary ailment.     Pt is low risk for pulmonary complications for low risk procedure. He is optimized from pulmonary standpoint.  Recommend to continue home regimen of symbicort, spiriva and prn albuterol.  May benefit from extubation to NIV, defer to anesthesia.  Outpt f/u with his pulmonologist    Thank you for this consult, will follow with you

## 2021-02-10 NOTE — PROGRESS NOTE ADULT - PROBLEM SELECTOR PLAN 9
RISK                                                          Points  [] Previous VTE                                           3  [] Thrombophilia                                        2  [] Lower limb paralysis                              2   [] Current Cancer                                       2   [x] Immobilization > 24 hrs                        1  [] ICU/CCU stay > 24 hours                       1  [x] Age > 60                                                   1    Heparin 5000U SQ Q8 - Continue home medications   - No indication of involving psych as of now

## 2021-02-10 NOTE — PROGRESS NOTE ADULT - PROBLEM SELECTOR PLAN 1
- Patient has left arm fistula, with palpable thrill  - Increased pain for sometime, no improvement  - Difficulty with dialysis  - Suspected Aneurysm  - Plan to revise on 2/11 by Dr Foote  - No indication of antibiotics for now   - Pain control with tylenol and percocet, dilaudid for severe pain  - Pre op monitoring   - RCRI score: 2 , Class III risk, patient has intermediate risk for post operative complications   - Echo from December shows normal cardiac function, however ,patient is morbidly obese   - will get cardiac clearance: Dr. Reyes  - Patient will get permacath by vascular surgery at same time of av fistula revision.  - Vascular follow up Dr Foote

## 2021-02-10 NOTE — PROGRESS NOTE ADULT - SUBJECTIVE AND OBJECTIVE BOX
Surgery Pre-op    Dx: L AVF malfunction  Procedure: L AVF revision, PC placement    Vital Signs Last 24 Hrs  T(C): 36.4 (10 Feb 2021 13:42), Max: 36.9 (2021 21:51)  T(F): 97.6 (10 Feb 2021 13:42), Max: 98.5 (2021 21:51)  HR: 67 (10 Feb 2021 17:18) (62 - 78)  BP: 164/92 (10 Feb 2021 17:18) (143/81 - 164/92)  BP(mean): --  RR: 18 (10 Feb 2021 13:42) (18 - 18)  SpO2: 100% (10 Feb 2021 13:42) (99% - 100%)    LABS:                        11.1   7.38  )-----------( 211      ( 10 Feb 2021 11:06 )             37.0              02-10    131<L>  |  93<L>  |  55<H>  ----------------------------<  102<H>  5.8<H>   |  27  |  9.24<H>    Ca    9.5      10 Feb 2021 11:06  Phos  8.5     02-10  Mg     2.3     02-10    TPro  7.5  /  Alb  3.0<L>  /  TBili  0.4  /  DBili  x   /  AST  9<L>  /  ALT  19  /  AlkPhos  124<H>  02-10    Urinalysis Basic - ( 2021 19:15 )    Color: Yellow / Appearance: Clear / S.010 / pH: x  Gluc: x / Ketone: Negative  / Bili: Negative / Urobili: Negative   Blood: x / Protein: 500 mg/dL / Nitrite: Negative   Leuk Esterase: Negative / RBC: 2-5 /HPF / WBC 0-2 /HPF   Sq Epi: x / Non Sq Epi: Occasional /HPF / Bacteria: Trace /HPF    COVID-19 PCR: NotDetec: EUA/IVD  You can help in the fight against COVID-19. [facility] may contact  you to see if you are interested in voluntarily participating in one of  our clinical trials.  This test has been validated by [employer] to be accurate;  though it has not been FDA cleared/approved by the usual pathway  As with all laboratory test, results should be correlated with clinical  findings.  https://www.fda.gov/media/874491/download  https://www.fda.gov/media/087903/download (21 @ 20:17)    RADIOLOGY & ADDITIONAL STUDIES:  < from: 12 Lead ECG (21 @ 22:27) >  Diagnosis Line Normal sinus rhythm  Left axis deviation  Voltage criteria for left ventricular hypertrophy  Abnormal ECG    < end of copied text >    < from: Transthoracic Echocardiogram (20 @ 08:22) >  CONCLUSIONS:  1. Normal mitral valve. Trace mitral regurgitation.  2. Probably trileaflet aortic valve is not well seen. No  aortic stenosis. No aortic valve regurgitation seen.  3. Normal aortic root.  4. Normalleft atrium.  5. Probably normal left ventricular internal dimensions and  wall thicknesses for patient's BSA.  6. Endocardium not well visualized; grossly normal left  ventricular systolic function.  7. Normal diastolic function.  8. Right ventriclenot well visualized. Normal RV systolic  function (TAPSE 2.2 cm).  9. Normal tricuspid valve. Trace tricuspid regurgitation.  10. Pulmonic valve not well seen. Trace pulmonic  insufficiency is noted.  11. No pericardial effusion.    < end of copied text >

## 2021-02-10 NOTE — PROGRESS NOTE ADULT - ASSESSMENT
Patient is a 40y Male whom presented to the hospital with LT arm pain  He has ESRD on HD via a LUEAVF . Presented to ED with painful AVF.  scheduled for revision of AVF in AM.  hemodialysis today, for optmization for surgery.  # anemia of CKD. HGB stable  # lytes/acid base at goal  # pruritic rash RLE and LUE. chronically elevated phos. cont sevelamer . Getting hydrocortisone scream for symptomatic relief

## 2021-02-10 NOTE — PROGRESS NOTE ADULT - PROBLEM SELECTOR PLAN 2
- s./p dialysis on 2/8  - Dr La is the nephrologist   - He will follow in house for continuation of dialysis  - Monitor electrolytes and BMP  - Adjust medications  - in house dialysis on 2/10 before procedure - s./p dialysis on 2/8  - Dr La is the nephrologist   - He will follow in house for continuation of dialysis  - Monitor electrolytes and BMP  - Adjust medications  - in house dialysis on 2/10 before procedure and on 2/12 prior to dc

## 2021-02-10 NOTE — PROGRESS NOTE ADULT - PROBLEM SELECTOR PLAN 6
- Patient reports he has not denzel using his insulin as sugars runs around 90  - Will start sliding scale for now  - Diabetic diet   - A1C from december 4.6 - Continue home medications with parameters  -  Dash diet  - monitor BP

## 2021-02-11 ENCOUNTER — APPOINTMENT (OUTPATIENT)
Dept: VASCULAR SURGERY | Facility: HOSPITAL | Age: 41
End: 2021-02-11
Payer: MEDICARE

## 2021-02-11 ENCOUNTER — RESULT REVIEW (OUTPATIENT)
Age: 41
End: 2021-02-11

## 2021-02-11 ENCOUNTER — TRANSCRIPTION ENCOUNTER (OUTPATIENT)
Age: 41
End: 2021-02-11

## 2021-02-11 LAB
ALBUMIN SERPL ELPH-MCNC: 3 G/DL — LOW (ref 3.5–5)
ALP SERPL-CCNC: 126 U/L — HIGH (ref 40–120)
ALT FLD-CCNC: 17 U/L DA — SIGNIFICANT CHANGE UP (ref 10–60)
ANION GAP SERPL CALC-SCNC: 9 MMOL/L — SIGNIFICANT CHANGE UP (ref 5–17)
APTT BLD: 36.3 SEC — HIGH (ref 27.5–35.5)
AST SERPL-CCNC: 9 U/L — LOW (ref 10–40)
BILIRUB SERPL-MCNC: 0.4 MG/DL — SIGNIFICANT CHANGE UP (ref 0.2–1.2)
BLD GP AB SCN SERPL QL: SIGNIFICANT CHANGE UP
BUN SERPL-MCNC: 54 MG/DL — HIGH (ref 7–18)
CALCIUM SERPL-MCNC: 9.4 MG/DL — SIGNIFICANT CHANGE UP (ref 8.4–10.5)
CHLORIDE SERPL-SCNC: 96 MMOL/L — SIGNIFICANT CHANGE UP (ref 96–108)
CO2 SERPL-SCNC: 25 MMOL/L — SIGNIFICANT CHANGE UP (ref 22–31)
CREAT SERPL-MCNC: 8.39 MG/DL — HIGH (ref 0.5–1.3)
GLUCOSE BLDC GLUCOMTR-MCNC: 81 MG/DL — SIGNIFICANT CHANGE UP (ref 70–99)
GLUCOSE BLDC GLUCOMTR-MCNC: 83 MG/DL — SIGNIFICANT CHANGE UP (ref 70–99)
GLUCOSE BLDC GLUCOMTR-MCNC: 84 MG/DL — SIGNIFICANT CHANGE UP (ref 70–99)
GLUCOSE BLDC GLUCOMTR-MCNC: 90 MG/DL — SIGNIFICANT CHANGE UP (ref 70–99)
GLUCOSE BLDC GLUCOMTR-MCNC: 90 MG/DL — SIGNIFICANT CHANGE UP (ref 70–99)
GLUCOSE BLDC GLUCOMTR-MCNC: 93 MG/DL — SIGNIFICANT CHANGE UP (ref 70–99)
GLUCOSE SERPL-MCNC: 90 MG/DL — SIGNIFICANT CHANGE UP (ref 70–99)
HCT VFR BLD CALC: 35.3 % — LOW (ref 39–50)
HGB BLD-MCNC: 10.7 G/DL — LOW (ref 13–17)
INR BLD: 1 RATIO — SIGNIFICANT CHANGE UP (ref 0.88–1.16)
MAGNESIUM SERPL-MCNC: 2.2 MG/DL — SIGNIFICANT CHANGE UP (ref 1.6–2.6)
MCHC RBC-ENTMCNC: 28.3 PG — SIGNIFICANT CHANGE UP (ref 27–34)
MCHC RBC-ENTMCNC: 30.3 GM/DL — LOW (ref 32–36)
MCV RBC AUTO: 93.4 FL — SIGNIFICANT CHANGE UP (ref 80–100)
NRBC # BLD: 0 /100 WBCS — SIGNIFICANT CHANGE UP (ref 0–0)
PHOSPHATE SERPL-MCNC: 7.8 MG/DL — HIGH (ref 2.5–4.5)
PLATELET # BLD AUTO: 197 K/UL — SIGNIFICANT CHANGE UP (ref 150–400)
POTASSIUM SERPL-MCNC: 5.2 MMOL/L — SIGNIFICANT CHANGE UP (ref 3.5–5.3)
POTASSIUM SERPL-SCNC: 5.2 MMOL/L — SIGNIFICANT CHANGE UP (ref 3.5–5.3)
PROT SERPL-MCNC: 7.3 G/DL — SIGNIFICANT CHANGE UP (ref 6–8.3)
PROTHROM AB SERPL-ACNC: 11.9 SEC — SIGNIFICANT CHANGE UP (ref 10.6–13.6)
RBC # BLD: 3.78 M/UL — LOW (ref 4.2–5.8)
RBC # FLD: 14.1 % — SIGNIFICANT CHANGE UP (ref 10.3–14.5)
SODIUM SERPL-SCNC: 130 MMOL/L — LOW (ref 135–145)
WBC # BLD: 6.95 K/UL — SIGNIFICANT CHANGE UP (ref 3.8–10.5)
WBC # FLD AUTO: 6.95 K/UL — SIGNIFICANT CHANGE UP (ref 3.8–10.5)

## 2021-02-11 PROCEDURE — 99232 SBSQ HOSP IP/OBS MODERATE 35: CPT | Mod: GC

## 2021-02-11 PROCEDURE — 35011 REPAIR DEFECT OF ARTERY: CPT | Mod: LT,59

## 2021-02-11 PROCEDURE — 36833 AV FISTULA REVISION: CPT | Mod: LT,59

## 2021-02-11 PROCEDURE — 71045 X-RAY EXAM CHEST 1 VIEW: CPT | Mod: 26

## 2021-02-11 PROCEDURE — 36558 INSERT TUNNELED CV CATH: CPT | Mod: LT

## 2021-02-11 PROCEDURE — 88304 TISSUE EXAM BY PATHOLOGIST: CPT | Mod: 26

## 2021-02-11 PROCEDURE — 76937 US GUIDE VASCULAR ACCESS: CPT | Mod: 26

## 2021-02-11 PROCEDURE — 77001 FLUOROGUIDE FOR VEIN DEVICE: CPT | Mod: 26

## 2021-02-11 RX ORDER — OXYCODONE AND ACETAMINOPHEN 5; 325 MG/1; MG/1
1 TABLET ORAL EVERY 4 HOURS
Refills: 0 | Status: DISCONTINUED | OUTPATIENT
Start: 2021-02-11 | End: 2021-02-15

## 2021-02-11 RX ORDER — ACETAMINOPHEN 500 MG
1000 TABLET ORAL ONCE
Refills: 0 | Status: COMPLETED | OUTPATIENT
Start: 2021-02-11 | End: 2021-02-11

## 2021-02-11 RX ORDER — GLUCAGON INJECTION, SOLUTION 0.5 MG/.1ML
1 INJECTION, SOLUTION SUBCUTANEOUS ONCE
Refills: 0 | Status: DISCONTINUED | OUTPATIENT
Start: 2021-02-11 | End: 2021-02-15

## 2021-02-11 RX ORDER — SEVELAMER CARBONATE 2400 MG/1
800 POWDER, FOR SUSPENSION ORAL
Refills: 0 | Status: DISCONTINUED | OUTPATIENT
Start: 2021-02-11 | End: 2021-02-14

## 2021-02-11 RX ORDER — RISPERIDONE 4 MG/1
2 TABLET ORAL DAILY
Refills: 0 | Status: DISCONTINUED | OUTPATIENT
Start: 2021-02-11 | End: 2021-02-15

## 2021-02-11 RX ORDER — NIFEDIPINE 30 MG
30 TABLET, EXTENDED RELEASE 24 HR ORAL DAILY
Refills: 0 | Status: DISCONTINUED | OUTPATIENT
Start: 2021-02-11 | End: 2021-02-15

## 2021-02-11 RX ORDER — METOPROLOL TARTRATE 50 MG
50 TABLET ORAL
Refills: 0 | Status: DISCONTINUED | OUTPATIENT
Start: 2021-02-11 | End: 2021-02-15

## 2021-02-11 RX ORDER — HYDROMORPHONE HYDROCHLORIDE 2 MG/ML
0.5 INJECTION INTRAMUSCULAR; INTRAVENOUS; SUBCUTANEOUS
Refills: 0 | Status: DISCONTINUED | OUTPATIENT
Start: 2021-02-11 | End: 2021-02-11

## 2021-02-11 RX ORDER — FOLIC ACID 0.8 MG
1 TABLET ORAL DAILY
Refills: 0 | Status: DISCONTINUED | OUTPATIENT
Start: 2021-02-11 | End: 2021-02-15

## 2021-02-11 RX ORDER — HYDROCORTISONE 1 %
1 OINTMENT (GRAM) TOPICAL
Refills: 0 | Status: DISCONTINUED | OUTPATIENT
Start: 2021-02-11 | End: 2021-02-15

## 2021-02-11 RX ORDER — ACETAMINOPHEN 500 MG
650 TABLET ORAL EVERY 6 HOURS
Refills: 0 | Status: DISCONTINUED | OUTPATIENT
Start: 2021-02-11 | End: 2021-02-15

## 2021-02-11 RX ORDER — ONDANSETRON 8 MG/1
4 TABLET, FILM COATED ORAL EVERY 8 HOURS
Refills: 0 | Status: DISCONTINUED | OUTPATIENT
Start: 2021-02-11 | End: 2021-02-15

## 2021-02-11 RX ORDER — GABAPENTIN 400 MG/1
300 CAPSULE ORAL AT BEDTIME
Refills: 0 | Status: DISCONTINUED | OUTPATIENT
Start: 2021-02-11 | End: 2021-02-15

## 2021-02-11 RX ORDER — QUETIAPINE FUMARATE 200 MG/1
50 TABLET, FILM COATED ORAL AT BEDTIME
Refills: 0 | Status: DISCONTINUED | OUTPATIENT
Start: 2021-02-11 | End: 2021-02-15

## 2021-02-11 RX ORDER — HYDROMORPHONE HYDROCHLORIDE 2 MG/ML
1 INJECTION INTRAMUSCULAR; INTRAVENOUS; SUBCUTANEOUS EVERY 6 HOURS
Refills: 0 | Status: DISCONTINUED | OUTPATIENT
Start: 2021-02-11 | End: 2021-02-12

## 2021-02-11 RX ORDER — SENNA PLUS 8.6 MG/1
2 TABLET ORAL AT BEDTIME
Refills: 0 | Status: DISCONTINUED | OUTPATIENT
Start: 2021-02-11 | End: 2021-02-15

## 2021-02-11 RX ORDER — SODIUM CHLORIDE 9 MG/ML
1000 INJECTION, SOLUTION INTRAVENOUS
Refills: 0 | Status: DISCONTINUED | OUTPATIENT
Start: 2021-02-11 | End: 2021-02-11

## 2021-02-11 RX ORDER — ONDANSETRON 8 MG/1
4 TABLET, FILM COATED ORAL ONCE
Refills: 0 | Status: DISCONTINUED | OUTPATIENT
Start: 2021-02-11 | End: 2021-02-11

## 2021-02-11 RX ORDER — MIRTAZAPINE 45 MG/1
15 TABLET, ORALLY DISINTEGRATING ORAL AT BEDTIME
Refills: 0 | Status: DISCONTINUED | OUTPATIENT
Start: 2021-02-11 | End: 2021-02-15

## 2021-02-11 RX ORDER — ATORVASTATIN CALCIUM 80 MG/1
40 TABLET, FILM COATED ORAL AT BEDTIME
Refills: 0 | Status: DISCONTINUED | OUTPATIENT
Start: 2021-02-11 | End: 2021-02-15

## 2021-02-11 RX ORDER — INSULIN LISPRO 100/ML
VIAL (ML) SUBCUTANEOUS
Refills: 0 | Status: DISCONTINUED | OUTPATIENT
Start: 2021-02-11 | End: 2021-02-15

## 2021-02-11 RX ORDER — DEXTROSE 50 % IN WATER 50 %
25 SYRINGE (ML) INTRAVENOUS ONCE
Refills: 0 | Status: DISCONTINUED | OUTPATIENT
Start: 2021-02-11 | End: 2021-02-15

## 2021-02-11 RX ORDER — ALBUTEROL 90 UG/1
2 AEROSOL, METERED ORAL EVERY 6 HOURS
Refills: 0 | Status: DISCONTINUED | OUTPATIENT
Start: 2021-02-11 | End: 2021-02-15

## 2021-02-11 RX ADMIN — Medication 50 MILLIGRAM(S): at 06:54

## 2021-02-11 RX ADMIN — ATORVASTATIN CALCIUM 40 MILLIGRAM(S): 80 TABLET, FILM COATED ORAL at 23:02

## 2021-02-11 RX ADMIN — Medication 400 MILLIGRAM(S): at 23:57

## 2021-02-11 RX ADMIN — Medication 400 MILLIGRAM(S): at 18:20

## 2021-02-11 RX ADMIN — MIRTAZAPINE 15 MILLIGRAM(S): 45 TABLET, ORALLY DISINTEGRATING ORAL at 23:02

## 2021-02-11 RX ADMIN — SENNA PLUS 2 TABLET(S): 8.6 TABLET ORAL at 22:40

## 2021-02-11 RX ADMIN — HYDROMORPHONE HYDROCHLORIDE 0.5 MILLIGRAM(S): 2 INJECTION INTRAMUSCULAR; INTRAVENOUS; SUBCUTANEOUS at 20:14

## 2021-02-11 RX ADMIN — Medication 30 MILLIGRAM(S): at 06:54

## 2021-02-11 RX ADMIN — GABAPENTIN 300 MILLIGRAM(S): 400 CAPSULE ORAL at 23:02

## 2021-02-11 RX ADMIN — QUETIAPINE FUMARATE 50 MILLIGRAM(S): 200 TABLET, FILM COATED ORAL at 23:02

## 2021-02-11 RX ADMIN — ONDANSETRON 4 MILLIGRAM(S): 8 TABLET, FILM COATED ORAL at 08:28

## 2021-02-11 RX ADMIN — ONDANSETRON 4 MILLIGRAM(S): 8 TABLET, FILM COATED ORAL at 23:22

## 2021-02-11 RX ADMIN — Medication 1 APPLICATION(S): at 06:55

## 2021-02-11 RX ADMIN — HYDROMORPHONE HYDROCHLORIDE 0.5 MILLIGRAM(S): 2 INJECTION INTRAMUSCULAR; INTRAVENOUS; SUBCUTANEOUS at 18:28

## 2021-02-11 NOTE — PROGRESS NOTE ADULT - PROBLEM SELECTOR PLAN 5
- Patient is non compliant, he uses inhaler irregularly   - Currently not in any exacerbation   - Will continue albuterol inhaler PRN  - Pulmonology consult prior to Surgery: Dr. Nielson

## 2021-02-11 NOTE — DISCHARGE NOTE PROVIDER - NSDCCPCAREPLAN_GEN_ALL_CORE_FT
PRINCIPAL DISCHARGE DIAGNOSIS  Diagnosis: Aneurysm of arteriovenous dialysis fistula, initial encounter  Assessment and Plan of Treatment:       SECONDARY DISCHARGE DIAGNOSES  Diagnosis: ESRD on dialysis  Assessment and Plan of Treatment: ESRD on dialysis    Diagnosis: Testicular microlithiasis  Assessment and Plan of Treatment:     Diagnosis: Rash  Assessment and Plan of Treatment: Rash    Diagnosis: HTN (hypertension)  Assessment and Plan of Treatment: HTN (hypertension)    Diagnosis: Diabetes mellitus, type 2  Assessment and Plan of Treatment: Diabetes mellitus, type 2    Diagnosis: Bipolar disorder  Assessment and Plan of Treatment: Bipolar disorder    Diagnosis: COPD (chronic obstructive pulmonary disease)  Assessment and Plan of Treatment: COPD (chronic obstructive pulmonary disease)    Diagnosis: Schizophrenia  Assessment and Plan of Treatment: Schizophrenia     PRINCIPAL DISCHARGE DIAGNOSIS  Diagnosis: Aneurysm of arteriovenous dialysis fistula, initial encounter  Assessment and Plan of Treatment:       SECONDARY DISCHARGE DIAGNOSES  Diagnosis: Diabetes mellitus, type 2  Assessment and Plan of Treatment: Diabetes mellitus, type 2    Diagnosis: COPD (chronic obstructive pulmonary disease)  Assessment and Plan of Treatment: COPD (chronic obstructive pulmonary disease)    Diagnosis: HTN (hypertension)  Assessment and Plan of Treatment: HTN (hypertension)    Diagnosis: ESRD on dialysis  Assessment and Plan of Treatment: ESRD on dialysis    Diagnosis: Schizophrenia  Assessment and Plan of Treatment: Schizophrenia    Diagnosis: Rash  Assessment and Plan of Treatment: Rash    Diagnosis: Bipolar disorder  Assessment and Plan of Treatment: Bipolar disorder    Diagnosis: Testicular microlithiasis  Assessment and Plan of Treatment: You had a swelling on the testes. Ultrasound testes was done that showed no abscess, and testicular microlithiasis. Please follow up with a urologist and get a repeat ultrasound in 6 months.     PRINCIPAL DISCHARGE DIAGNOSIS  Diagnosis: Aneurysm of arteriovenous dialysis fistula, initial encounter  Assessment and Plan of Treatment: You presented with pain at the site of your AV fistula. You came to the hospital for repair of your fistula. You were evaluated by vascular surgery.  You were seen by cardiology and pulmonology and were medically optimized for surgery. On 2/11/21 you underwent resection of your fistula aneurysm and a permacatheter was placed for dialysis. Please follow with Dr Foote after discharge.      SECONDARY DISCHARGE DIAGNOSES  Diagnosis: Testicular microlithiasis  Assessment and Plan of Treatment: You had a swelling on the testes. Ultrasound testes was done that showed no abscess, and testicular microlithiasis. Please follow up with a urologist and get a repeat ultrasound in 6 months.    Diagnosis: ESRD on dialysis  Assessment and Plan of Treatment: Your fistula was repaired and you got a tunneled catheter. You got hemodialysis on 10th and 12th. Continue your dialysis as intructed by your Nephrologist. Dialysis will be reinstated upon your discharge.  Please mantain a diet adequate for you condition  Continue dialysis on : Monday, Wednesday and Friday.    Diagnosis: Rash  Assessment and Plan of Treatment: You presented with an itchy, flaking rash on left arm and right leg in pattern of red tattoo. You were started on hydrocortisone cream. Continue to apply hydrocortisone 1% cream topically 2x a day for 2 weeks. Follow with your pcp within a week after discharge.    Diagnosis: HTN (hypertension)  Assessment and Plan of Treatment: Continue with blood pressure medication. Maintain a healthy diet that consist of low sugar, low fat, low sodium diet. Exercise frequently if possible.  Follow up with primary care physician in one week after discharge.    Diagnosis: Diabetes mellitus, type 2  Assessment and Plan of Treatment: HbAIC was found to be 4.6 on admission. You are currently not using any insulin. You must maintain a healthy diet that consist of low sugar, low fat, low sodium diet. Exercise frequently if possible. Consider repeating your Hemoglobin A1c within 3 months after discharge to monitor your average blood glucose control. Follow up with primary care physician in one week after discharge.    Diagnosis: Schizophrenia  Assessment and Plan of Treatment: Continue with your home medications remeron and seroquel.     PRINCIPAL DISCHARGE DIAGNOSIS  Diagnosis: Aneurysm of arteriovenous dialysis fistula, initial encounter  Assessment and Plan of Treatment: You presented with pain at the site of your AV fistula. You came to the hospital for repair of your fistula. You were evaluated by vascular surgery.  You were seen by cardiology and pulmonology and were medically optimized for surgery. On 2/11/21 you underwent resection of your fistula aneurysm and a permacatheter was placed for dialysis. Please follow with Dr Foote after discharge. You can continue to take percocet as needed for severe pain for 3 days.      SECONDARY DISCHARGE DIAGNOSES  Diagnosis: Testicular microlithiasis  Assessment and Plan of Treatment: You had a swelling on the testes. Ultrasound testes was done that showed no abscess, and testicular microlithiasis. Please follow up with a urologist and get a repeat ultrasound in 6 months.    Diagnosis: ESRD on dialysis  Assessment and Plan of Treatment: Your fistula was repaired and you got a tunneled catheter. You got hemodialysis on 10th and 12th. Continue your dialysis as intructed by your Nephrologist. Dialysis will be reinstated upon your discharge.  Please mantain a diet adequate for you condition. Your sevalamer dose was increased.  Continue dialysis on : Monday, Wednesday and Friday.    Diagnosis: Rash  Assessment and Plan of Treatment: You presented with an itchy, flaking rash on left arm and right leg in pattern of red tattoo. You were started on hydrocortisone cream. Continue to apply hydrocortisone 1% cream topically 2x a day for 2 weeks. You are also suggested to follow with a dermatologist after discharge.    Diagnosis: HTN (hypertension)  Assessment and Plan of Treatment: Continue with blood pressure medication. Maintain a healthy diet that consist of low sugar, low fat, low sodium diet. Exercise frequently if possible.  Follow up with primary care physician in one week after discharge.    Diagnosis: Diabetes mellitus, type 2  Assessment and Plan of Treatment: HbAIC was found to be 4.6 on admission. You are currently not using any insulin. You must maintain a healthy diet that consist of low sugar, low fat, low sodium diet. Exercise frequently if possible. Consider repeating your Hemoglobin A1c within 3 months after discharge to monitor your average blood glucose control. Follow up with primary care physician in one week after discharge.    Diagnosis: Schizophrenia  Assessment and Plan of Treatment: Continue with your home medications remeron and seroquel.     PRINCIPAL DISCHARGE DIAGNOSIS  Diagnosis: Aneurysm of arteriovenous dialysis fistula, initial encounter  Assessment and Plan of Treatment: You presented with pain at the site of your AV fistula. You came to the hospital for repair of your fistula. You were evaluated by vascular surgery.  You were seen by cardiology and pulmonology and were medically optimized for surgery. On 2/11/21 you underwent resection of your fistula aneurysm and a permacatheter was placed for dialysis. Please follow with Dr Foote within 1 week after discharge. You can continue to take percocet as needed for severe pain for 3 days.      SECONDARY DISCHARGE DIAGNOSES  Diagnosis: ESRD on dialysis  Assessment and Plan of Treatment: Your fistula was repaired and you got a tunneled catheter. You got hemodialysis on 10th and 12th. Continue your dialysis as intructed by your Nephrologist. Dialysis will be reinstated upon your discharge.  Please mantain a diet adequate for you condition. Your sevalamer dose was increased.  Continue dialysis on : Monday, Wednesday and Friday.    Diagnosis: Testicular microlithiasis  Assessment and Plan of Treatment: You had a swelling on the testes. Ultrasound testes was done that showed no abscess, and testicular microlithiasis. Please follow up with a urologist and get a repeat ultrasound in 6 months.    Diagnosis: Rash  Assessment and Plan of Treatment: You presented with an itchy, flaking rash on left arm and right leg in pattern of red tattoo. You were started on hydrocortisone cream. Continue to apply hydrocortisone 1% cream topically 2x a day for 2 weeks. You are also suggested to follow with a dermatologist after discharge.    Diagnosis: HTN (hypertension)  Assessment and Plan of Treatment: Continue with blood pressure medication. Maintain a healthy diet that consist of low sugar, low fat, low sodium diet. Exercise frequently if possible.  Follow up with primary care physician in one week after discharge.    Diagnosis: Diabetes mellitus, type 2  Assessment and Plan of Treatment: HbAIC was found to be 4.6 on admission. You are currently not using any insulin. You must maintain a healthy diet that consist of low sugar, low fat, low sodium diet. Exercise frequently if possible. Consider repeating your Hemoglobin A1c within 3 months after discharge to monitor your average blood glucose control. Follow up with primary care physician in one week after discharge.    Diagnosis: Schizophrenia  Assessment and Plan of Treatment: Continue with your home medications remeron and seroquel.

## 2021-02-11 NOTE — DISCHARGE NOTE PROVIDER - NSDCMRMEDTOKEN_GEN_ALL_CORE_FT
atorvastatin 40 mg oral tablet: 1 tab(s) orally once a day (at bedtime)  gabapentin 300 mg oral capsule: 1 cap(s) orally once a day (at bedtime)  metoprolol tartrate 50 mg oral tablet: 1 tab(s) orally 2 times a day  mirtazapine 15 mg oral tablet: 1 tab(s) orally once a day (at bedtime)  Procardia XL 30 mg oral tablet, extended release: 1 tab(s) orally once a day  QUEtiapine 50 mg oral tablet, extended release: 1 tab(s) orally once a day (in the evening)  risperiDONE 1 mg oral tablet: 2 tab(s) orally once a dayat bed time  sevelamer carbonate 800 mg oral tablet: 1 tab(s) orally 3 times a day (with meals)   atorvastatin 40 mg oral tablet: 1 tab(s) orally once a day (at bedtime)  folic acid 1 mg oral tablet: 1 tab(s) orally once a day  gabapentin 300 mg oral capsule: 1 cap(s) orally once a day (at bedtime)  hydrocortisone 1% topical cream: 1 application topically 2 times a day  metoprolol tartrate 50 mg oral tablet: 1 tab(s) orally 2 times a day  mirtazapine 15 mg oral tablet: 1 tab(s) orally once a day (at bedtime)  Procardia XL 30 mg oral tablet, extended release: 1 tab(s) orally once a day  QUEtiapine 50 mg oral tablet, extended release: 1 tab(s) orally once a day (in the evening)  risperiDONE 1 mg oral tablet: 2 tab(s) orally once a dayat bed time  sevelamer carbonate 800 mg oral tablet: 1 tab(s) orally 3 times a day (with meals)   atorvastatin 40 mg oral tablet: 1 tab(s) orally once a day (at bedtime)  folic acid 1 mg oral tablet: 1 tab(s) orally once a day  gabapentin 300 mg oral capsule: 1 cap(s) orally once a day (at bedtime)  hydrocortisone 1% topical cream: 1 application topically 2 times a day  metoprolol tartrate 50 mg oral tablet: 1 tab(s) orally 2 times a day  mirtazapine 15 mg oral tablet: 1 tab(s) orally once a day (at bedtime)  oxycodone-acetaminophen 5 mg-325 mg oral tablet: 1 tab(s) orally every 6 hours, As Needed -severe pain MDD:4 tabs/day  Procardia XL 30 mg oral tablet, extended release: 1 tab(s) orally once a day  QUEtiapine 50 mg oral tablet, extended release: 1 tab(s) orally once a day (in the evening)  risperiDONE 1 mg oral tablet: 2 tab(s) orally once a dayat bed time  senna oral tablet: 2 tab(s) orally once a day (at bedtime), As Needed for constipation  sevelamer carbonate 800 mg oral tablet: 2 tab(s) orally 3 times a day (with meals)

## 2021-02-11 NOTE — PROGRESS NOTE ADULT - PROBLEM SELECTOR PLAN 7
- Patient reports he has not denzel using his insulin as sugars runs around 90  - Will start sliding scale for now  - Diabetic diet   - A1C from december 4.6

## 2021-02-11 NOTE — PROGRESS NOTE ADULT - PROBLEM SELECTOR PLAN 1
- Patient has left arm fistula, with palpable thrill  - Increased pain for sometime, no improvement  - Difficulty with dialysis  - Suspected Aneurysm  - Plan to revise on 2/11 by Dr Foote  - No indication of antibiotics for now   - Pain control with tylenol and percocet, dilaudid for severe pain  - Pre op monitoring   - RCRI score: 2 , Class III risk, patient has intermediate risk for post operative complications   - Echo from December shows normal cardiac function, however ,patient is morbidly obese   - will get cardiac clearance: Dr. Reyes  - pulmonary clearance: Dr. Nielson  - Patient will get permacath by vascular surgery at same time of av fistula revision.  - Vascular follow up Dr Foote - Patient has left arm fistula, with palpable thrill  - Increased pain for sometime, no improvement  - Difficulty with dialysis  - Suspected Aneurysm  - Plan to revise on 2/11 by Dr Foote  - No indication of antibiotics for now   - Pain control with tylenol and percocet, dilaudid for severe pain  - Pre op monitoring   - RCRI score: 2 , Class III risk, patient has intermediate risk for post operative complications   - Echo from December shows normal cardiac function, however ,patient is morbidly obese   - will get cardiac clearance: Dr. Reyes  - pulmonary : Dr. Nielson  - Patient will get permacath by vascular surgery at same time of av fistula revision.  - Vascular follow up Dr Foote

## 2021-02-11 NOTE — PROGRESS NOTE ADULT - PROBLEM SELECTOR PLAN 2
- s./p dialysis on 2/8  - Dr La is the nephrologist   - He will follow in house for continuation of dialysis  - Monitor electrolytes and BMP  - Adjust medications  - in house dialysis on 2/10 before procedure  - dialysis on 2/12 prior to dc - s./p dialysis on 2/10  - Dr La is the nephrologist   - He will follow in house for continuation of dialysis  - Monitor electrolytes and BMP  - Adjust medications  - dialysis on 2/12 prior to dc

## 2021-02-11 NOTE — PROGRESS NOTE ADULT - ASSESSMENT
40y.o. Male s/p resection of LUE aneurysm, L cephalic to L IJ graft bypass, PC placement    -Add IV tylenol for breakthrough pain  -LUE elevation  -Dressing change POD#2, earlier if dressing becomes more saturated  -MAURIZIO drain care  -DVT ppx  -Incentive spirometry  -Reg diet

## 2021-02-11 NOTE — PROGRESS NOTE ADULT - SUBJECTIVE AND OBJECTIVE BOX
Vascular Surgery    Subjective:  Pt resting comfortably, but c/o severe upper L arm, L neck pain.   Dilaudid not addressing pain fully.   Tolerating diet.  Denies numbness/tingling of LUE.    T(C): 36.9 (02-11-21 @ 21:50), Max: 36.9 (02-11-21 @ 21:50)  HR: 70 (02-11-21 @ 21:50) (59 - 73)  BP: 153/77 (02-11-21 @ 21:50) (123/85 - 160/75)  RR: 18 (02-11-21 @ 21:50) (13 - 19)  SpO2: 98% (02-11-21 @ 21:50) (97% - 100%)    Physical:  Gen: A&O x3  LUE: Warm, distal sensation intact. Radial pulse 2+. Anterior upper arm dressing saturated at dependent portion. L should Dressing C/D/I. MAURIZIO in place.  Chest: PC in place.     Vascular Surgery    Subjective:  Pt resting comfortably, but c/o severe upper L arm, L neck pain.   Dilaudid not addressing pain fully.   Tolerating diet.  Denies numbness/tingling of LUE.    T(C): 36.9 (02-11-21 @ 21:50), Max: 36.9 (02-11-21 @ 21:50)  HR: 70 (02-11-21 @ 21:50) (59 - 73)  BP: 153/77 (02-11-21 @ 21:50) (123/85 - 160/75)  RR: 18 (02-11-21 @ 21:50) (13 - 19)  SpO2: 98% (02-11-21 @ 21:50) (97% - 100%)    Physical:  Gen: A&O x3  LUE: Warm, distal sensation intact. Radial pulse 2+. Anterior upper arm dressing saturated at dependent portion. L should Dressing C/D/I. MAURIZIO in place.  Chest: PC in place.    < from: Xray Chest 1 View- PORTABLE-Urgent (Xray Chest 1 View- PORTABLE-Urgent .) (02.11.21 @ 18:32) >  IMPRESSION:    Right central line in situ. No pneumothorax.    < end of copied text >

## 2021-02-11 NOTE — PHYSICAL THERAPY INITIAL EVALUATION ADULT - ASSISTIVE DEVICE FOR TRANSFER: BED/CHAIR, REHAB EVAL
Last refill on levothyroxine #45 on 11/17/2020  Last refill on metformin #90 on 11/17/2020  Last office visit pertaining to refill on 7/13/2020  Future Appointments   Date Time Provider Camron Lazaro   3/9/2021 10:00 AM Ranjan Dean MD [facility]ero straight cane

## 2021-02-11 NOTE — BRIEF OPERATIVE NOTE - OPERATION/FINDINGS
left upper extremity aneurysmal avf s/p resection of aneuyrsm and stent with primary repair of cephalic vein in addition to cephalic to internal jugular vein bypass with ptfe   -permcath insertion at the right ij with fluoro guidence

## 2021-02-11 NOTE — DISCHARGE NOTE PROVIDER - CARE PROVIDER_API CALL
Isac La  INTERNAL MEDICINE  [address] [place] [address]  Phone: [phone]  Fax: [phone]  Follow Up Time:     Oren Foote)  Vascular Surgery  [address] [facility], [address]  [place] [address]  Phone: [phone]  Fax: [phone]  Follow Up Time:     Eliceo Coburn  [facility] MEDICINE  [address] [place] [address]  Phone: [phone]  Fax: [phone]  Follow Up Time:    Isac La  INTERNAL MEDICINE  [address] [place] [address]  Phone: [phone]  Fax: [phone]  Follow Up Time:     Oren Foote)  Vascular Surgery  [address] [facility], [address]  [place] [address]  Phone: [phone]  Fax: [phone]  Follow Up Time:     Eliceo Coburn  [facility] MEDICINE  [address] [place] [address]  Phone: [phone]  Fax: [phone]  Follow Up Time:     Matt Hannah)  Urology  [address] [facility], [address]nd [place] [address]  Phone: [phone]  Fax: [phone]  Follow Up Time:

## 2021-02-11 NOTE — PROGRESS NOTE ADULT - PROBLEM SELECTOR PLAN 4
- Patient has small skin swelling around 1 cm on scrotum, tender to palpation.   - questionable if on skin, epididymis or vasculature, not on testicle.  - non-erythematous, no discharge  - Will start bactrim PO  - ESR elevated at 27   - scrotal US shows microlithiasis, needs urologic follow up and repeat US 6-12 mo outpatient  - d/c bactrim  - continue pain management  - new similar nodule on L upper inner thigh - Patient has small skin swelling around 1 cm on scrotum, tender to palpation.   - questionable if on skin, epididymis or vasculature, not on testicle.  - non-erythematous, no discharge  - Will start bactrim PO  - ESR elevated at 27   - scrotal US shows microlithiasis, needs urologic follow up and repeat US 6-12 mo outpatient  - d/c bactrim  - continue pain management  - new similar nodule on L upper inner thigh likely ingrown hair.

## 2021-02-11 NOTE — DISCHARGE NOTE PROVIDER - ATTENDING COMMENTS
39 y/o male with ESRD admitted for Left AVF revision due to aneurysm, went to the OR for AVF revision and Permacath placement on 2/11 with vascular surgery Dr. Foote, had a MAURIZIO drain, vascular following, removed MAURIZIO drain, pain slowly improving switched to PO percocet, will give short course on discharge. Vascular cleared for discharge, would like outpatient follow up in 1 week w/ Dr. Foote. Continue with his chronic home O2 at 3L. Testicular US showed microlithiasis, need outpatient urology follow up and repeat Testicular US in 6-12 months. Continue dialysis on M/W/F schedule, outpatient HD reinstated. Increased Sevelamer to 1600 TID. Continue hydrocortisone cream for pruritis x 2 weeks, continues to improve, will need outpatient follow up with Dermatology.

## 2021-02-11 NOTE — DISCHARGE NOTE PROVIDER - HOSPITAL COURSE
40 year old man with PMH of DM, ESRD on dialysis, Hypertension, COPD on home oxygen, Bipolar diease and schizophrenia, followed by Dr La closely sent in for AV fistula evaluation post HD 2/8/21 for suspected aneurysm. Patient has been complaining of pain at the AV fistula site for quite sometime and he was sent to hospital for fistula repair on 2/11 and requires medical optimization. He also reports a small scrotal bump/swelling for few days which is painful but not erythematous. He had an abscess drained in the past in same location. 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 Patient advised to follow up with urology outpatient. Pt seen by cardiology and pulmonology and was medically optimized for surgery. On 2/11/21 patient underwent xxxxxxxx by Dr. Foote without complications. He got Dialysis on 2/10 and 2/12. He will follow up with Dr. La as outpatient for continuation of dialysis.     Pt is stable for discharge. Pt has been advised to follow up as outpatient. Case has denzel discussed with the attending. This is just a summary of the case. For further information please refer to pt. chart document. 40 year old man with PMH of DM, ESRD on dialysis, Hypertension, COPD on home oxygen, Bipolar diease and schizophrenia, followed by Dr La closely sent in for AV fistula evaluation post HD 2/8/21 for suspected aneurysm. Patient has been complaining of pain at the AV fistula site for quite sometime and he was sent to hospital for fistula repair on 2/11 and requires medical optimization. He also reports a small scrotal bump/swelling for few days which is painful but not erythematous. He had an abscess drained in the past in same location. 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 Patient advised to follow up with urology outpatient. Pt seen by cardiology and pulmonology and was medically optimized for surgery. On 2/11/21 patient underwent resection of LUE aneurysm, L cephalic to L IJ graft bypass, PC placement by Dr. Foote without complications. He got Dialysis on 2/10 and 2/12. He will follow up with Dr. La as outpatient for continuation of dialysis.     Pt is stable for discharge. Pt has been advised to follow up as outpatient. Case has denzel discussed with the attending. This is just a summary of the case. For further information please refer to pt. chart document. 40 year old man with PMH of DM, ESRD on dialysis, Hypertension, COPD on home oxygen, Bipolar diease and schizophrenia, followed by Dr La closely sent in for AV fistula evaluation post HD 2/8/21 for suspected aneurysm after having pain at the AV fistula site for sometime. Patient was admitted to the hospital for fistula repair on 2/11 requiring medical optimization. Patient also had a small scrotal bump/swelling for few days which was painful but not erythematous. 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 Patient advised to follow up with urology outpatient. Patient was seen by cardiology and pulmonology and was medically optimized for surgery. On 2/11/21 patient underwent resection of LUE aneurysm, L cephalic to L IJ graft bypass, PC placement by Dr. Foote without complications. He received pain medication post-op for pain. He got Dialysis on 2/10 and 2/12. He will follow up with Dr. La as outpatient for continuation of dialysis.     Pt is stable for discharge. Pt has been advised to follow up as outpatient. Case has denzel discussed with the attending. This is just a summary of the case. For further information please refer to pt. chart document. 40 year old man with PMH of DM, ESRD on dialysis, Hypertension, COPD on home oxygen, Bipolar diease and schizophrenia, followed by Dr La closely was sent in for AV fistula evaluation post HD 2/8/21 for suspected aneurysm after having pain at the AV fistula site for sometime. Patient was admitted to the hospital for fistula repair on 2/11 requiring medical optimization. Patient also had a small scrotal bump/swelling for few days which was painful but not erythematous. 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 Patient was advised to follow up with urology outpatient. Patient was seen by cardiology and pulmonology and was medically optimized for surgery. On 2/11/21 patient underwent resection of LUE aneurysm, L cephalic to L IJ graft bypass, PC placement by Dr. Foote without complications. He received pain medication post-op for pain. He got Dialysis on 2/10, 2/12 and 2/15. He will follow up with Dr. La as outpatient for continuation of dialysis.     Pt is stable for discharge. Pt has been advised to follow up as outpatient. Case has denzel discussed with the attending. This is just a summary of the case. For further information please refer to pt. chart document.

## 2021-02-11 NOTE — DISCHARGE NOTE PROVIDER - DETAILS OF MALNUTRITION DIAGNOSIS/DIAGNOSES
This patient has been assessed with a concern for Malnutrition and was treated during this hospitalization for the following Nutrition diagnosis/diagnoses:     -  02/15/2021: Morbid obesity (BMI > 40)   This patient has been assessed with a concern for Malnutrition and was treated during this hospitalization for the following Nutrition diagnosis/diagnoses:     -  02/15/2021: Morbid obesity (BMI > 40)    This patient has been assessed with a concern for Malnutrition and was treated during this hospitalization for the following Nutrition diagnosis/diagnoses:     -  02/15/2021: Morbid obesity (BMI > 40)

## 2021-02-11 NOTE — BRIEF OPERATIVE NOTE - NSICDXBRIEFPROCEDURE_GEN_ALL_CORE_FT
PROCEDURES:  Insertion of dialysis catheter with imaging guidance 11-Feb-2021 17:01:19  Deidre Mauro  Revision, dialysis AV fistula 11-Feb-2021 16:58:57  Deidre Mauro

## 2021-02-11 NOTE — DISCHARGE NOTE PROVIDER - CARE PROVIDERS DIRECT ADDRESSES
,DirectAddress_Unknown,[email or web address] ,DirectAddress_Unknown,[email or web address],DirectAddress_Unknown

## 2021-02-11 NOTE — DISCHARGE NOTE PROVIDER - PROVIDER TOKENS
PROVIDER:[TOKEN:[[id number]]],PROVIDER:[TOKEN:[[id number]]],PROVIDER:[TOKEN:[[id number]]] PROVIDER:[TOKEN:[[id number]]],PROVIDER:[TOKEN:[[id number]]],PROVIDER:[TOKEN:[[id number]]],PROVIDER:[TOKEN:[[id number]]]

## 2021-02-11 NOTE — PROGRESS NOTE ADULT - SUBJECTIVE AND OBJECTIVE BOX
pt. with ESRD  c/o pain in AVF  left with AVF with aneurysm  plan for revision with bypass to IJ  and possible permcath  risks and benefits discussed with patient  he agrees

## 2021-02-11 NOTE — PROGRESS NOTE ADULT - PROBLEM SELECTOR PLAN 3
- pt has pruritic, flaking rash on LUE and RLE in pattern of red tattoo dye  - given hydrocortisone 1% cream  - apply topically 2x a day - pt has pruritic, flaking rash on LUE and RLE in pattern of red tattoo dye  - given hydrocortisone 1% cream  - apply topically 2x a day  - improving

## 2021-02-11 NOTE — PROGRESS NOTE ADULT - SUBJECTIVE AND OBJECTIVE BOX
MS3/4 Progress note, Discussed with Resident and Attending      CHIEF COMPLAINT & BRIEF HOSPITAL COURSE: 40 year old man with PMH of DM, ESRD on dialysis, Hypertension, COPD on home oxygen, Bipolar diease and schizophrenia, followed by Dr La closely sent in for AV fistula evaluation post HD 2/8/21 for suspected aneurysm. Patient has been complaining of pain at the AV fistula site for quite sometime and he is sent to hospital for fistula repair on 2/11 and requires medical optimization. He also reports a small scrotal bump/swelling for few days which is painful but not erythematous. He had an abscess drained in the past in same location. 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 Patient advised to follow up with urology outpatient. Pt seen by cardiology and pulmonology and is medically optimized for surgery today. Plan HD on 2/10 in afternoon prior to surgery and then on 2/12 prior to discharge      INTERVAL HPI/OVERNIGHT EVENTS: Pt seen and examined at bedside. Pt still complains of pain at the AV fistula site and at the scrotal nodule. Pt complains of a new nodule located on the inner L thigh that is tender to the touch, non erythematous and without discharge. He does not know if this is a new onset or if he has only just noticed it. He reports it feels similar to the scrotal nodule. Pt still complains of an itchy rash and states that the hydrocortisone cream is not helping. PT complains of intermittent 4/10 abdominal pain in the LLQ has returned today. Pt received dialysis last night where about 3L was removed.  Pt also complains of nausea since dialysis, pt was able to eat dinner last night and denies vomiting. Zofran was given. Pt is NPO except meds in preparation for fistula repair today, 2/11.       REVIEW OF SYSTEMS:  CONSTITUTIONAL: No fever, weight loss, or fatigue  RESPIRATORY: No cough, wheezing, chills or hemoptysis; No shortness of breath  CARDIOVASCULAR: No chest pain, palpitations, dizziness, or leg swelling  GASTROINTESTINAL: No abdominal pain. + nausea no vomiting, or hematemesis; No diarrhea or constipation. No melena or hematochezia.  GENITOURINARY: tender nodule on R testicular area.  MUSCULOSKELETAL: Pain on left arm at proximal aspect of AV fistula  NEUROLOGICAL: No headaches, memory loss, loss of strength, numbness, or tremors  SKIN: pruritic rash located on RLE and LUE, pattern consistent with red tattoo dye. Tender, non erythematous nodule on L upper thigh.     MEDICATIONS  (STANDING):  atorvastatin 40 milliGRAM(s) Oral at bedtime  dextrose 50% Injectable 25 Gram(s) IV Push once  folic acid 1 milliGRAM(s) Oral daily  gabapentin 300 milliGRAM(s) Oral at bedtime  glucagon  Injectable 1 milliGRAM(s) IntraMuscular once  hydrocortisone 1% Cream 1 Application(s) Topical two times a day  insulin lispro (ADMELOG) corrective regimen sliding scale   SubCutaneous three times a day before meals  metoprolol tartrate 50 milliGRAM(s) Oral two times a day  mirtazapine 15 milliGRAM(s) Oral at bedtime  NIFEdipine XL 30 milliGRAM(s) Oral daily  QUEtiapine 50 milliGRAM(s) Oral at bedtime  risperiDONE   Tablet 2 milliGRAM(s) Oral daily  senna 2 Tablet(s) Oral at bedtime  sevelamer carbonate 800 milliGRAM(s) Oral three times a day with meals    MEDICATIONS  (PRN):  acetaminophen   Tablet .. 650 milliGRAM(s) Oral every 6 hours PRN Temp greater or equal to 38C (100.4F), Mild Pain (1 - 3)  ALBUTerol    90 MICROgram(s) HFA Inhaler 2 Puff(s) Inhalation every 6 hours PRN Shortness of Breath and/or Wheezing  HYDROmorphone   Tablet 1 milliGRAM(s) Oral every 6 hours PRN Severe Pain (7 - 10)  ondansetron Injectable 4 milliGRAM(s) IV Push every 8 hours PRN Nausea and/or Vomiting  oxycodone    5 mG/acetaminophen 325 mG 1 Tablet(s) Oral every 4 hours PRN Moderate Pain (4 - 6)      Vital Signs Last 24 Hrs  T(C): 36.2 (11 Feb 2021 05:48), Max: 36.8 (10 Feb 2021 22:25)  T(F): 97.2 (11 Feb 2021 05:48), Max: 98.3 (10 Feb 2021 22:25)  HR: 69 (11 Feb 2021 05:48) (63 - 69)  BP: 144/83 (11 Feb 2021 05:48) (121/68 - 164/92)  BP(mean): --  RR: 18 (11 Feb 2021 05:48) (18 - 18)  SpO2: 97% (11 Feb 2021 05:48) (97% - 100%)    PHYSICAL EXAMINATION:  GENERAL: NAD, obese male with NC in place at 3L  HEAD:  Atraumatic, Normocephalic  EYES:  conjunctiva and sclera clear, no scleral icterus  NECK: Supple, No JVD, Normal thyroid  CHEST/LUNG: Clear to auscultation.  No rales, rhonchi, wheezing, or rubs  HEART: Regular rate and rhythm; mild systolic murmur appreciated  ABDOMEN: Soft, mild tenderness in LLQ, Nondistended; Bowel sounds present  NERVOUS SYSTEM:  Alert & Oriented X3,  Strength 5/5 in upper and lower extremities, sensation intact  EXTREMITIES: AV fistula on LUE with good palpable thrill and audible bruit, no discharge or erythema.  2+ Peripheral Pulses, No clubbing, cyanosis, or edema  SKIN: warm dry, multiple scars on AV fistula site.  Pruritic rash located on LUE, Lower extremities bilaterally in location of red tattoo dye. Tender, nonerythematous nodule on L upper inner thigh                          10.7   6.95  )-----------( 197      ( 11 Feb 2021 06:49 )             35.3     02-11    130<L>  |  96  |  54<H>  ----------------------------<  90  5.2   |  25  |  8.39<H>    Ca    9.4      11 Feb 2021 06:49  Phos  7.8     02-11  Mg     2.2     02-11    TPro  7.3  /  Alb  3.0<L>  /  TBili  0.4  /  DBili  x   /  AST  9<L>  /  ALT  17  /  AlkPhos  126<H>  02-11    LIVER FUNCTIONS - ( 11 Feb 2021 06:49 )  Alb: 3.0 g/dL / Pro: 7.3 g/dL / ALK PHOS: 126 U/L / ALT: 17 U/L DA / AST: 9 U/L / GGT: x               PT/INR - ( 11 Feb 2021 06:49 )   PT: 11.9 sec;   INR: 1.00 ratio         PTT - ( 11 Feb 2021 06:49 )  PTT:36.3 sec  I&O's Summary    10 Feb 2021 07:01  -  11 Feb 2021 07:00  --------------------------------------------------------  IN: 600 mL / OUT: 3253 mL / NET: -2653 mL        Culture - Urine (collected 09 Feb 2021 02:58)  Source: .Urine Clean Catch (Midstream)  Final Report (09 Feb 2021 22:18):    <10,000 CFU/mL Normal Urogenital Tayler        RADIOLOGY & ADDITIONAL TESTS:                   MS3/4 Progress note, Discussed with Resident and Attending      CHIEF COMPLAINT & BRIEF HOSPITAL COURSE: 40 year old man with PMH of DM, ESRD on dialysis, Hypertension, COPD on home oxygen, Bipolar diease and schizophrenia, followed by Dr La closely sent in for AV fistula evaluation post HD 2/8/21 for suspected aneurysm. Patient has been complaining of pain at the AV fistula site for quite sometime and he is sent to hospital for fistula repair on 2/11 and requires medical optimization. He also reports a small scrotal bump/swelling for few days which is painful but not erythematous. He had an abscess drained in the past in same location. 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 Patient advised to follow up with urology outpatient. Pt seen by cardiology and pulmonology and is medically optimized for surgery today. Plan HD on 2/10 in afternoon prior to surgery and then on 2/12 prior to discharge      INTERVAL HPI/OVERNIGHT EVENTS: Pt seen and examined at bedside. Pt still complains of pain at the AV fistula site and at the scrotal nodule. Pt complains of a new nodule located on the inner L thigh that is tender to the touch, non erythematous and without discharge. He does not know if this is a new onset or if he has only just noticed it. He reports it feels similar to the scrotal nodule. Pt still complains of an itchy rash and states that the hydrocortisone cream is not helping. PT complains of intermittent 4/10 abdominal pain in the LLQ has returned today. Pt received dialysis last night where about 3L was removed.  Pt also complains of nausea since dialysis, pt was able to eat dinner last night and denies vomiting. Zofran was given. Pt is NPO except meds in preparation for fistula repair today, 2/11.       REVIEW OF SYSTEMS:  CONSTITUTIONAL: No fever, weight loss, or fatigue  RESPIRATORY: No cough, wheezing, chills or hemoptysis; No shortness of breath  CARDIOVASCULAR: No chest pain, palpitations, dizziness, or leg swelling  GASTROINTESTINAL: No abdominal pain. + nausea. no vomiting, or hematemesis; No diarrhea or constipation. No melena or hematochezia.  GENITOURINARY: tender nodule on R testicular area.  MUSCULOSKELETAL: Pain on left arm at proximal aspect of AV fistula  NEUROLOGICAL: No headaches, memory loss, loss of strength, numbness, or tremors  SKIN: pruritic rash located on RLE and LUE, pattern consistent with red tattoo dye. Tender, non erythematous nodule on L upper thigh.     MEDICATIONS  (STANDING):  atorvastatin 40 milliGRAM(s) Oral at bedtime  dextrose 50% Injectable 25 Gram(s) IV Push once  folic acid 1 milliGRAM(s) Oral daily  gabapentin 300 milliGRAM(s) Oral at bedtime  glucagon  Injectable 1 milliGRAM(s) IntraMuscular once  hydrocortisone 1% Cream 1 Application(s) Topical two times a day  insulin lispro (ADMELOG) corrective regimen sliding scale   SubCutaneous three times a day before meals  metoprolol tartrate 50 milliGRAM(s) Oral two times a day  mirtazapine 15 milliGRAM(s) Oral at bedtime  NIFEdipine XL 30 milliGRAM(s) Oral daily  QUEtiapine 50 milliGRAM(s) Oral at bedtime  risperiDONE   Tablet 2 milliGRAM(s) Oral daily  senna 2 Tablet(s) Oral at bedtime  sevelamer carbonate 800 milliGRAM(s) Oral three times a day with meals    MEDICATIONS  (PRN):  acetaminophen   Tablet .. 650 milliGRAM(s) Oral every 6 hours PRN Temp greater or equal to 38C (100.4F), Mild Pain (1 - 3)  ALBUTerol    90 MICROgram(s) HFA Inhaler 2 Puff(s) Inhalation every 6 hours PRN Shortness of Breath and/or Wheezing  HYDROmorphone   Tablet 1 milliGRAM(s) Oral every 6 hours PRN Severe Pain (7 - 10)  ondansetron Injectable 4 milliGRAM(s) IV Push every 8 hours PRN Nausea and/or Vomiting  oxycodone    5 mG/acetaminophen 325 mG 1 Tablet(s) Oral every 4 hours PRN Moderate Pain (4 - 6)      Vital Signs Last 24 Hrs  T(C): 36.2 (11 Feb 2021 05:48), Max: 36.8 (10 Feb 2021 22:25)  T(F): 97.2 (11 Feb 2021 05:48), Max: 98.3 (10 Feb 2021 22:25)  HR: 69 (11 Feb 2021 05:48) (63 - 69)  BP: 144/83 (11 Feb 2021 05:48) (121/68 - 164/92)  BP(mean): --  RR: 18 (11 Feb 2021 05:48) (18 - 18)  SpO2: 97% (11 Feb 2021 05:48) (97% - 100%)    PHYSICAL EXAMINATION:  GENERAL: NAD, obese male with NC in place at 3L  HEAD:  Atraumatic, Normocephalic  EYES:  conjunctiva and sclera clear, no scleral icterus  NECK: Supple, No JVD, Normal thyroid  CHEST/LUNG: Clear to auscultation.  No rales, rhonchi, wheezing, or rubs  HEART: Regular rate and rhythm; mild systolic murmur appreciated  ABDOMEN: Soft, mild tenderness in LLQ, Nondistended; Bowel sounds present  NERVOUS SYSTEM:  Alert & Oriented X3,  Strength 5/5 in upper and lower extremities, sensation intact  EXTREMITIES: AV fistula on LUE with good palpable thrill and audible bruit, no discharge or erythema.  2+ Peripheral Pulses, No clubbing, cyanosis, or edema  SKIN: warm dry, multiple scars on AV fistula site.  Pruritic rash located on LUE, Lower extremities bilaterally in location of red tattoo dye. Tender, nonerythematous nodule on L upper inner thigh                          10.7   6.95  )-----------( 197      ( 11 Feb 2021 06:49 )             35.3     02-11    130<L>  |  96  |  54<H>  ----------------------------<  90  5.2   |  25  |  8.39<H>    Ca    9.4      11 Feb 2021 06:49  Phos  7.8     02-11  Mg     2.2     02-11    TPro  7.3  /  Alb  3.0<L>  /  TBili  0.4  /  DBili  x   /  AST  9<L>  /  ALT  17  /  AlkPhos  126<H>  02-11    LIVER FUNCTIONS - ( 11 Feb 2021 06:49 )  Alb: 3.0 g/dL / Pro: 7.3 g/dL / ALK PHOS: 126 U/L / ALT: 17 U/L DA / AST: 9 U/L / GGT: x               PT/INR - ( 11 Feb 2021 06:49 )   PT: 11.9 sec;   INR: 1.00 ratio         PTT - ( 11 Feb 2021 06:49 )  PTT:36.3 sec  I&O's Summary    10 Feb 2021 07:01  -  11 Feb 2021 07:00  --------------------------------------------------------  IN: 600 mL / OUT: 3253 mL / NET: -2653 mL        Culture - Urine (collected 09 Feb 2021 02:58)  Source: .Urine Clean Catch (Midstream)  Final Report (09 Feb 2021 22:18):    <10,000 CFU/mL Normal Urogenital Tayler        RADIOLOGY & ADDITIONAL TESTS:

## 2021-02-12 LAB
ALBUMIN SERPL ELPH-MCNC: 3 G/DL — LOW (ref 3.5–5)
ALP SERPL-CCNC: 130 U/L — HIGH (ref 40–120)
ALT FLD-CCNC: 20 U/L DA — SIGNIFICANT CHANGE UP (ref 10–60)
ANION GAP SERPL CALC-SCNC: 14 MMOL/L — SIGNIFICANT CHANGE UP (ref 5–17)
ANION GAP SERPL CALC-SCNC: 9 MMOL/L — SIGNIFICANT CHANGE UP (ref 5–17)
AST SERPL-CCNC: 19 U/L — SIGNIFICANT CHANGE UP (ref 10–40)
BASOPHILS # BLD AUTO: 0.02 K/UL — SIGNIFICANT CHANGE UP (ref 0–0.2)
BASOPHILS NFR BLD AUTO: 0.2 % — SIGNIFICANT CHANGE UP (ref 0–2)
BILIRUB SERPL-MCNC: 0.4 MG/DL — SIGNIFICANT CHANGE UP (ref 0.2–1.2)
BUN SERPL-MCNC: 43 MG/DL — HIGH (ref 7–18)
BUN SERPL-MCNC: 67 MG/DL — HIGH (ref 7–18)
CALCIUM SERPL-MCNC: 9.2 MG/DL — SIGNIFICANT CHANGE UP (ref 8.4–10.5)
CALCIUM SERPL-MCNC: 9.3 MG/DL — SIGNIFICANT CHANGE UP (ref 8.4–10.5)
CHLORIDE SERPL-SCNC: 91 MMOL/L — LOW (ref 96–108)
CHLORIDE SERPL-SCNC: 93 MMOL/L — LOW (ref 96–108)
CO2 SERPL-SCNC: 21 MMOL/L — LOW (ref 22–31)
CO2 SERPL-SCNC: 29 MMOL/L — SIGNIFICANT CHANGE UP (ref 22–31)
CREAT SERPL-MCNC: 10.4 MG/DL — HIGH (ref 0.5–1.3)
CREAT SERPL-MCNC: 7.21 MG/DL — HIGH (ref 0.5–1.3)
EOSINOPHIL # BLD AUTO: 0.2 K/UL — SIGNIFICANT CHANGE UP (ref 0–0.5)
EOSINOPHIL NFR BLD AUTO: 2.4 % — SIGNIFICANT CHANGE UP (ref 0–6)
GLUCOSE BLDC GLUCOMTR-MCNC: 100 MG/DL — HIGH (ref 70–99)
GLUCOSE BLDC GLUCOMTR-MCNC: 80 MG/DL — SIGNIFICANT CHANGE UP (ref 70–99)
GLUCOSE BLDC GLUCOMTR-MCNC: 84 MG/DL — SIGNIFICANT CHANGE UP (ref 70–99)
GLUCOSE SERPL-MCNC: 114 MG/DL — HIGH (ref 70–99)
GLUCOSE SERPL-MCNC: 82 MG/DL — SIGNIFICANT CHANGE UP (ref 70–99)
HCT VFR BLD CALC: 37.8 % — LOW (ref 39–50)
HGB BLD-MCNC: 11.4 G/DL — LOW (ref 13–17)
IMM GRANULOCYTES NFR BLD AUTO: 0.8 % — SIGNIFICANT CHANGE UP (ref 0–1.5)
LYMPHOCYTES # BLD AUTO: 1 K/UL — SIGNIFICANT CHANGE UP (ref 1–3.3)
LYMPHOCYTES # BLD AUTO: 11.8 % — LOW (ref 13–44)
MAGNESIUM SERPL-MCNC: 2.2 MG/DL — SIGNIFICANT CHANGE UP (ref 1.6–2.6)
MCHC RBC-ENTMCNC: 28.2 PG — SIGNIFICANT CHANGE UP (ref 27–34)
MCHC RBC-ENTMCNC: 30.2 GM/DL — LOW (ref 32–36)
MCV RBC AUTO: 93.6 FL — SIGNIFICANT CHANGE UP (ref 80–100)
MONOCYTES # BLD AUTO: 0.85 K/UL — SIGNIFICANT CHANGE UP (ref 0–0.9)
MONOCYTES NFR BLD AUTO: 10 % — SIGNIFICANT CHANGE UP (ref 2–14)
NEUTROPHILS # BLD AUTO: 6.33 K/UL — SIGNIFICANT CHANGE UP (ref 1.8–7.4)
NEUTROPHILS NFR BLD AUTO: 74.8 % — SIGNIFICANT CHANGE UP (ref 43–77)
NRBC # BLD: 0 /100 WBCS — SIGNIFICANT CHANGE UP (ref 0–0)
PHOSPHATE SERPL-MCNC: 8.8 MG/DL — HIGH (ref 2.5–4.5)
PLATELET # BLD AUTO: 183 K/UL — SIGNIFICANT CHANGE UP (ref 150–400)
POTASSIUM SERPL-MCNC: 4.6 MMOL/L — SIGNIFICANT CHANGE UP (ref 3.5–5.3)
POTASSIUM SERPL-MCNC: 6.7 MMOL/L — CRITICAL HIGH (ref 3.5–5.3)
POTASSIUM SERPL-SCNC: 4.6 MMOL/L — SIGNIFICANT CHANGE UP (ref 3.5–5.3)
POTASSIUM SERPL-SCNC: 6.7 MMOL/L — CRITICAL HIGH (ref 3.5–5.3)
PROT SERPL-MCNC: 7.7 G/DL — SIGNIFICANT CHANGE UP (ref 6–8.3)
RBC # BLD: 4.04 M/UL — LOW (ref 4.2–5.8)
RBC # FLD: 13.9 % — SIGNIFICANT CHANGE UP (ref 10.3–14.5)
SODIUM SERPL-SCNC: 126 MMOL/L — LOW (ref 135–145)
SODIUM SERPL-SCNC: 131 MMOL/L — LOW (ref 135–145)
WBC # BLD: 8.47 K/UL — SIGNIFICANT CHANGE UP (ref 3.8–10.5)
WBC # FLD AUTO: 8.47 K/UL — SIGNIFICANT CHANGE UP (ref 3.8–10.5)

## 2021-02-12 PROCEDURE — 99232 SBSQ HOSP IP/OBS MODERATE 35: CPT | Mod: GC

## 2021-02-12 RX ORDER — HEPARIN SODIUM 5000 [USP'U]/ML
5000 INJECTION INTRAVENOUS; SUBCUTANEOUS EVERY 8 HOURS
Refills: 0 | Status: DISCONTINUED | OUTPATIENT
Start: 2021-02-12 | End: 2021-02-15

## 2021-02-12 RX ORDER — HYDROMORPHONE HYDROCHLORIDE 2 MG/ML
0.5 INJECTION INTRAMUSCULAR; INTRAVENOUS; SUBCUTANEOUS ONCE
Refills: 0 | Status: DISCONTINUED | OUTPATIENT
Start: 2021-02-12 | End: 2021-02-12

## 2021-02-12 RX ORDER — FOLIC ACID 0.8 MG
1 TABLET ORAL
Qty: 30 | Refills: 0
Start: 2021-02-12 | End: 2021-03-13

## 2021-02-12 RX ORDER — HYDROCORTISONE 1 %
1 OINTMENT (GRAM) TOPICAL
Qty: 1 | Refills: 0
Start: 2021-02-12 | End: 2021-02-25

## 2021-02-12 RX ORDER — HYDROMORPHONE HYDROCHLORIDE 2 MG/ML
1 INJECTION INTRAMUSCULAR; INTRAVENOUS; SUBCUTANEOUS EVERY 4 HOURS
Refills: 0 | Status: DISCONTINUED | OUTPATIENT
Start: 2021-02-12 | End: 2021-02-15

## 2021-02-12 RX ADMIN — Medication 1 APPLICATION(S): at 17:03

## 2021-02-12 RX ADMIN — MIRTAZAPINE 15 MILLIGRAM(S): 45 TABLET, ORALLY DISINTEGRATING ORAL at 22:19

## 2021-02-12 RX ADMIN — GABAPENTIN 300 MILLIGRAM(S): 400 CAPSULE ORAL at 22:19

## 2021-02-12 RX ADMIN — HYDROMORPHONE HYDROCHLORIDE 0.5 MILLIGRAM(S): 2 INJECTION INTRAMUSCULAR; INTRAVENOUS; SUBCUTANEOUS at 08:45

## 2021-02-12 RX ADMIN — HYDROMORPHONE HYDROCHLORIDE 1 MILLIGRAM(S): 2 INJECTION INTRAMUSCULAR; INTRAVENOUS; SUBCUTANEOUS at 19:14

## 2021-02-12 RX ADMIN — ATORVASTATIN CALCIUM 40 MILLIGRAM(S): 80 TABLET, FILM COATED ORAL at 22:19

## 2021-02-12 RX ADMIN — HEPARIN SODIUM 5000 UNIT(S): 5000 INJECTION INTRAVENOUS; SUBCUTANEOUS at 17:01

## 2021-02-12 RX ADMIN — Medication 30 MILLIGRAM(S): at 06:30

## 2021-02-12 RX ADMIN — HYDROMORPHONE HYDROCHLORIDE 1 MILLIGRAM(S): 2 INJECTION INTRAMUSCULAR; INTRAVENOUS; SUBCUTANEOUS at 23:21

## 2021-02-12 RX ADMIN — SEVELAMER CARBONATE 800 MILLIGRAM(S): 2400 POWDER, FOR SUSPENSION ORAL at 17:00

## 2021-02-12 RX ADMIN — QUETIAPINE FUMARATE 50 MILLIGRAM(S): 200 TABLET, FILM COATED ORAL at 22:19

## 2021-02-12 RX ADMIN — HYDROMORPHONE HYDROCHLORIDE 0.5 MILLIGRAM(S): 2 INJECTION INTRAMUSCULAR; INTRAVENOUS; SUBCUTANEOUS at 03:11

## 2021-02-12 RX ADMIN — HYDROMORPHONE HYDROCHLORIDE 1 MILLIGRAM(S): 2 INJECTION INTRAMUSCULAR; INTRAVENOUS; SUBCUTANEOUS at 14:30

## 2021-02-12 RX ADMIN — Medication 1 APPLICATION(S): at 06:27

## 2021-02-12 RX ADMIN — Medication 50 MILLIGRAM(S): at 06:30

## 2021-02-12 RX ADMIN — Medication 50 MILLIGRAM(S): at 19:10

## 2021-02-12 NOTE — PROGRESS NOTE ADULT - PROBLEM SELECTOR PLAN 3
- pt has pruritic, flaking rash on LUE and RLE in pattern of red tattoo dye  - given hydrocortisone 1% cream  - apply topically 2x a day  -rash improving   - improving

## 2021-02-12 NOTE — PROGRESS NOTE ADULT - SUBJECTIVE AND OBJECTIVE BOX
INTERVAL HPI/OVERNIGHT EVENTS:    Pt seen and examined at bedside. Admits to incisional pain, offers no other complaints.     Vital Signs Last 24 Hrs  T(C): 36.8 (12 Feb 2021 03:23), Max: 36.9 (11 Feb 2021 21:50)  T(F): 98.2 (12 Feb 2021 03:23), Max: 98.5 (11 Feb 2021 21:50)  HR: 77 (12 Feb 2021 06:04) (59 - 77)  BP: 157/81 (12 Feb 2021 06:04) (123/85 - 160/75)  BP(mean): 101 (11 Feb 2021 21:00) (87 - 109)  RR: 17 (12 Feb 2021 03:23) (13 - 19)  SpO2: 93% (12 Feb 2021 03:23) (93% - 100%)  I&O's Detail    11 Feb 2021 07:01  -  12 Feb 2021 07:00  --------------------------------------------------------  IN:    Sodium Chloride 0.9% Bolus: 250 mL  Total IN: 250 mL    OUT:    Bulb (mL): 35 mL    Estimated Blood Loss (mL): 250 mL  Total OUT: 285 mL    Total NET: -35 mL        senna 2 Tablet(s) Oral at bedtime  sevelamer carbonate 800 milliGRAM(s) Oral three times a day with meals      Physical Exam  General: AAOx3, No acute distress  Neck: dressing c/d/i, min TTP   Chest: rt PC in place, dressing c/d/i; left MAURIZIO in place, dressing c/d/i   LUE: Warm, motor and sensory intact; Radial pulse 2+. Anterior upper arm dressing saturated, taken down, wound w/ sutures in place, no active bleeding, dry dressing applied       Labs:                        11.4   8.47  )-----------( 183      ( 12 Feb 2021 07:38 )             37.8     02-12    126<L>  |  91<L>  |  67<H>  ----------------------------<  82  6.7<HH>   |  21<L>  |  10.40<H>    Ca    9.2      12 Feb 2021 07:38  Phos  8.8     02-12  Mg     2.2     02-12    TPro  7.7  /  Alb  3.0<L>  /  TBili  0.4  /  DBili  x   /  AST  19  /  ALT  20  /  AlkPhos  130<H>  02-12    PT/INR - ( 11 Feb 2021 06:49 )   PT: 11.9 sec;   INR: 1.00 ratio         PTT - ( 11 Feb 2021 06:49 )  PTT:36.3 sec

## 2021-02-12 NOTE — PROGRESS NOTE ADULT - PROBLEM SELECTOR PLAN 4
- Patient has small skin swelling around 1 cm on scrotum, tender to palpation.   - questionable if on skin, epididymis or vasculature, not on testicle.  - non-erythematous, no discharge  - Will start bactrim PO  - ESR elevated at 27   - scrotal US shows microlithiasis, needs urologic follow up and repeat US 6-12 mo outpatient  - d/c bactrim  - continue pain management  - new similar nodule on L upper inner thigh likely ingrown hair. - Patient has small skin swelling around 1 cm on scrotum, tender to palpation.   - questionable if on skin, epididymis or vasculature, not on testicle.  - non-erythematous, no discharge  - Will start bactrim PO  - ESR elevated at 27   - scrotal US shows microlithiasis, needs urologic follow up and repeat US 6-12 mo outpatient  - continue pain management  - new similar nodule on L upper inner thigh likely ingrown hair.

## 2021-02-12 NOTE — PROGRESS NOTE ADULT - PROBLEM SELECTOR PLAN 1
- Patient has left arm fistula, with palpable thrill  - Increased pain for sometime, no improvement  - Difficulty with dialysis  - Suspected Aneurysm  - Plan to revise on 2/11 by Dr Foote  - No indication of antibiotics for now   - Pain control with tylenol and percocet, dilaudid for severe pain  - Pre op monitoring   - RCRI score: 2 , Class III risk, patient has intermediate risk for post operative complications   - Echo from December shows normal cardiac function, however ,patient is morbidly obese   - will get cardiac clearance: Dr. Reyes  - pulmonary : Dr. Nielson  - Patient will get permacath by vascular surgery at same time of av fistula revision.  - Vascular follow up Dr Foote  - procedure 2/11  left upper extremity aneurysmal avf s/p resection of aneuyrsm and stent with primary repair of cephalic vein in addition to cephalic to internal jugular vein bypass with ptfe   -permcath insertion at the right IJ with fluoro guidance  - LUE elevation  - dressing change POD#2 or earlier if becomes more saturated  -MAURIZIO drain care - Patient has left arm fistula, with palpable thrill  - Increased pain for sometime, no improvement  - Pain control with tylenol and percocet, dilaudid for severe pain  - Vascular follow up Dr Foote  - Surgery on 2/11, POD 1, left upper extremity aneurysmal avf s/p resection of aneuyrsm and stent with primary repair of cephalic vein in addition to cephalic to internal jugular vein bypass with ptfe   -permcath insertion at the right IJ with fluoro guidance  - LUE elevation  - dressing change prn  - MAURIZIO drain care and monitoring

## 2021-02-12 NOTE — PROGRESS NOTE ADULT - ASSESSMENT
40 year old man with PMH of DM, ESRD on dialysis, Hypertension, COPD, Bipolar diease and schizophrenia, followed by Dr La closely sent in for AV fistula evaluation post HD today for suspected aneurysm. Patient is admitted for AV fistula repair/revision  40 year old man with PMH of DM, ESRD on dialysis, Hypertension, COPD, Bipolar diease and schizophrenia, followed by Dr La closely sent in for AV fistula evaluation post HD today for suspected aneurysm. Patient is admitted for AV fistula repair/revision.  s/p resection of LUE aneurysm, L cephalic to L IJ graft bypass, PC placement 2/11.

## 2021-02-12 NOTE — PROGRESS NOTE ADULT - SUBJECTIVE AND OBJECTIVE BOX
Nephrology Followup Note - [phone] - Dr Villalobos / Dr Sanchez / Dr Barber / Dr Mehta / Dr Mauricio / Dr Alexander / Dr Hui / Dr La  Pt seen and examined at bedside  No acute events overnight. pt reports some pain over av access site.     Allergies:  aspirin (Swelling)  fish (Unknown)  penicillin (Rash)  penicillins (Swelling)  shellfish (Rash)  shellfish (Unknown)    Hospital Medications:   MEDICATIONS  (STANDING):  atorvastatin 40 milliGRAM(s) Oral at bedtime  dextrose 50% Injectable 25 Gram(s) IV Push once  folic acid 1 milliGRAM(s) Oral daily  gabapentin 300 milliGRAM(s) Oral at bedtime  glucagon  Injectable 1 milliGRAM(s) IntraMuscular once  heparin   Injectable 5000 Unit(s) SubCutaneous every 8 hours  hydrocortisone 1% Cream 1 Application(s) Topical two times a day  insulin lispro (ADMELOG) corrective regimen sliding scale   SubCutaneous three times a day before meals  metoprolol tartrate 50 milliGRAM(s) Oral two times a day  mirtazapine 15 milliGRAM(s) Oral at bedtime  NIFEdipine XL 30 milliGRAM(s) Oral daily  QUEtiapine 50 milliGRAM(s) Oral at bedtime  risperiDONE   Tablet 2 milliGRAM(s) Oral daily  senna 2 Tablet(s) Oral at bedtime  sevelamer carbonate 800 milliGRAM(s) Oral three times a day with meals    VITALS:  T(F): 97.8 (21 @ 09:08), Max: 98.5 (21 @ 21:50)  HR: 64 (21 @ 09:08)  BP: 158/88 (21 @ 09:08)  RR: 16 (21 @ 09:08)  SpO2: 100% (21 @ 09:08)  Wt(kg): --     @ 07:01  -   @ 07:00  --------------------------------------------------------  IN: 250 mL / OUT: 285 mL / NET: -35 mL        PHYSICAL EXAM:  Constitutional: NAD  HEENT: anicteric sclera, oropharynx clear, MMM  Neck: No JVD  Respiratory: CTAB, no wheezes, rales or rhonchi  Cardiovascular: S1, S2, RRR  Gastrointestinal: BS+, soft, NT/ND  Extremities: No cyanosis or clubbing. No peripheral edema  Neurological: A/O x 3, no focal deficits  Psychiatric: Normal mood, normal affect  : No CVA tenderness. No hernandez.   Skin: No rashes  Vascular Access: RIJ Tunneled cath.     LABS:      126<L>  |  91<L>  |  67<H>  ----------------------------<  82  6.7<HH>   |  21<L>  |  10.40<H>    Ca    9.2      2021 07:38  Phos  8.8       Mg     2.2         TPro  7.7  /  Alb  3.0<L>  /  TBili  0.4  /  DBili      /  AST  19  /  ALT  20  /  AlkPhos  130<H>      Creatinine Trend: 10.40 <--, 8.39 <--, 9.24 <--, 7.03 <--, 6.07 <--                        11.4   8.47  )-----------( 183      ( 2021 07:38 )             37.8     Urine Studies:  Urinalysis Basic - ( 2021 19:15 )    Color: Yellow / Appearance: Clear / S.010 / pH:   Gluc:  / Ketone: Negative  / Bili: Negative / Urobili: Negative   Blood:  / Protein: 500 mg/dL / Nitrite: Negative   Leuk Esterase: Negative / RBC: 2-5 /HPF / WBC 0-2 /HPF   Sq Epi:  / Non Sq Epi: Occasional /HPF / Bacteria: Trace /HPF        RADIOLOGY & ADDITIONAL STUDIES:

## 2021-02-12 NOTE — PROGRESS NOTE ADULT - SUBJECTIVE AND OBJECTIVE BOX
MS3/4 Progress note, Discussed with Resident and Attending      CHIEF COMPLAINT & BRIEF HOSPITAL COURSE: 40 year old man with PMH of DM, ESRD on dialysis, Hypertension, COPD on home oxygen, Bipolar diease and schizophrenia, followed by Dr La closely sent in for AV fistula evaluation post HD 2/8/21 for suspected aneurysm. Patient has been complaining of pain at the AV fistula site for quite sometime and he is sent to hospital for fistula repair on 2/11 and requires medical optimization. He also reports a small scrotal bump/swelling for few days which is painful but not erythematous. He had an abscess drained in the past in same location. 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 Patient advised to follow up with urology outpatient.  Plan HD on 2/10 in afternoon prior to surgery and then on 2/12.  Procedure done 2/11 left upper extremity aneurysmal avf s/p resection of aneuyrsm and stent with primary repair of cephalic vein in addition to cephalic to internal jugular vein bypass with ptfe  and permcath insertion at the right IJ with fluoro guidance.      INTERVAL HPI/OVERNIGHT EVENTS: Pt examined at bedside, reports severe pain in the left arm from neck down to the hand. He can still move his hand but cannot move his arm due to pain. He also complains of one episode of NBNB vomiting and nausea this morning, attributed to his pain and denies hematemesis. R sided pain present at permacath site but less severe than L sided pain. His dressing was saturated and changed this morning. The pt reports cream is helping his rash and it has been improving and less itchy.      REVIEW OF SYSTEMS:  CONSTITUTIONAL: No fever, weight loss, or fatigue  RESPIRATORY: No cough, wheezing, chills or hemoptysis; No shortness of breath  CARDIOVASCULAR: No chest pain, palpitations, dizziness, or leg swelling  GASTROINTESTINAL: No abdominal pain. + nausea and one episode NBNB vomiting, no hematemesis; No diarrhea or constipation. No melena or hematochezia.  GENITOURINARY: No dysuria or hematuria, urinary frequency  MUSCULOSKELETAL: Pain in L arm. extending from neck to hand due to operation.  NEUROLOGICAL: No headaches, memory loss, loss of strength, numbness, or tremors  SKIN: improving rash located on RLE and LUE, pattern consistent with red tattoo dye. Tender, non erythematous nodule on L upper thigh    MEDICATIONS  (STANDING):  atorvastatin 40 milliGRAM(s) Oral at bedtime  dextrose 50% Injectable 25 Gram(s) IV Push once  folic acid 1 milliGRAM(s) Oral daily  gabapentin 300 milliGRAM(s) Oral at bedtime  glucagon  Injectable 1 milliGRAM(s) IntraMuscular once  heparin   Injectable 5000 Unit(s) SubCutaneous every 8 hours  hydrocortisone 1% Cream 1 Application(s) Topical two times a day  insulin lispro (ADMELOG) corrective regimen sliding scale   SubCutaneous three times a day before meals  metoprolol tartrate 50 milliGRAM(s) Oral two times a day  mirtazapine 15 milliGRAM(s) Oral at bedtime  NIFEdipine XL 30 milliGRAM(s) Oral daily  QUEtiapine 50 milliGRAM(s) Oral at bedtime  risperiDONE   Tablet 2 milliGRAM(s) Oral daily  senna 2 Tablet(s) Oral at bedtime  sevelamer carbonate 800 milliGRAM(s) Oral three times a day with meals    MEDICATIONS  (PRN):  acetaminophen   Tablet .. 650 milliGRAM(s) Oral every 6 hours PRN Temp greater or equal to 38C (100.4F), Mild Pain (1 - 3)  ALBUTerol    90 MICROgram(s) HFA Inhaler 2 Puff(s) Inhalation every 6 hours PRN Shortness of Breath and/or Wheezing  HYDROmorphone   Tablet 1 milliGRAM(s) Oral every 6 hours PRN Severe Pain (7 - 10)  ondansetron Injectable 4 milliGRAM(s) IV Push every 8 hours PRN Nausea and/or Vomiting  oxycodone    5 mG/acetaminophen 325 mG 1 Tablet(s) Oral every 4 hours PRN Moderate Pain (4 - 6)      Vital Signs Last 24 Hrs  T(C): 36.8 (12 Feb 2021 03:23), Max: 36.9 (11 Feb 2021 21:50)  T(F): 98.2 (12 Feb 2021 03:23), Max: 98.5 (11 Feb 2021 21:50)  HR: 77 (12 Feb 2021 06:04) (59 - 77)  BP: 157/81 (12 Feb 2021 06:04) (123/85 - 160/75)  BP(mean): 101 (11 Feb 2021 21:00) (87 - 109)  RR: 17 (12 Feb 2021 03:23) (13 - 19)  SpO2: 93% (12 Feb 2021 03:23) (93% - 100%)    PHYSICAL EXAMINATION:  GENERAL: obese male with NC in place at 3L, discomfort due to pain  HEAD:  Atraumatic, Normocephalic  EYES:  conjunctiva and sclera clear, no scleral icterus  NECK: Supple, No JVD, Normal thyroid  CHEST/LUNG: Clear to auscultation.  No rales, rhonchi, wheezing, or rubs  HEART: Regular rate and rhythm; mild systolic murmur appreciated  ABDOMEN: Soft, Nontender, Nondistended; Bowel sounds present  NERVOUS SYSTEM:  Alert & Oriented X3, sensation intact  EXTREMITIES: Av fistula site with dressing in place, tenderness to palpation. L IJ bypass site with dressing in place. R sided permacath with dressing in place. MAURIZIO drain in place. 2+ Peripheral Pulses, No clubbing, cyanosis, or edema  SKIN: improving rash on LUE and RLE  MSK: limited ROM in ULE due to pain. left hand ROM normal.                          11.4   8.47  )-----------( 183      ( 12 Feb 2021 07:38 )             37.8     02-12    126<L>  |  91<L>  |  67<H>  ----------------------------<  82  6.7<HH>   |  21<L>  |  10.40<H>    Ca    9.2      12 Feb 2021 07:38  Phos  8.8     02-12  Mg     2.2     02-12    TPro  7.7  /  Alb  3.0<L>  /  TBili  0.4  /  DBili  x   /  AST  19  /  ALT  20  /  AlkPhos  130<H>  02-12    LIVER FUNCTIONS - ( 12 Feb 2021 07:38 )  Alb: 3.0 g/dL / Pro: 7.7 g/dL / ALK PHOS: 130 U/L / ALT: 20 U/L DA / AST: 19 U/L / GGT: x               PT/INR - ( 11 Feb 2021 06:49 )   PT: 11.9 sec;   INR: 1.00 ratio         PTT - ( 11 Feb 2021 06:49 )  PTT:36.3 sec  I&O's Summary    11 Feb 2021 07:01  -  12 Feb 2021 07:00  --------------------------------------------------------  IN: 250 mL / OUT: 285 mL / NET: -35 mL          RADIOLOGY & ADDITIONAL TESTS:                   MS3/4 Progress note, Discussed with Resident and Attending      CHIEF COMPLAINT & BRIEF HOSPITAL COURSE: 40 year old man with PMH of DM, ESRD on dialysis, Hypertension, COPD on home oxygen, Bipolar diease and schizophrenia, followed by Dr La closely sent in for AV fistula evaluation post HD 2/8/21 for suspected aneurysm. Patient has been complaining of pain at the AV fistula site for quite sometime and he is sent to hospital for fistula repair on 2/11 and requires medical optimization. He also reports a small scrotal bump/swelling for few days which is painful but not erythematous. He had an abscess drained in the past in same location. 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 Patient advised to follow up with urology outpatient.  Plan HD on 2/10 in afternoon prior to surgery and then on 2/12.  Procedure done 2/11 left upper extremity aneurysmal avf s/p resection of aneuyrsm and stent with primary repair of cephalic vein in addition to cephalic to internal jugular vein bypass with ptfe  and permcath insertion at the right IJ with fluoro guidance.      INTERVAL HPI/OVERNIGHT EVENTS: Pt examined at bedside, reports severe pain in the left arm from neck down to the hand. He can still move his hand but cannot move his arm due to pain. He also complains of one episode of NBNB vomiting and nausea this morning, attributed to his pain and denies hematemesis. R sided pain present at permacath site but less severe than L sided pain. His dressing was saturated and changed this morning. The pt reports cream is helping his rash and it has been improving and less itchy.      REVIEW OF SYSTEMS:  CONSTITUTIONAL: No fever, weight loss, or fatigue  RESPIRATORY: No cough, wheezing, chills or hemoptysis; No shortness of breath  CARDIOVASCULAR: No chest pain, palpitations, dizziness, or leg swelling  GASTROINTESTINAL: No abdominal pain. + nausea and one episode NBNB vomiting, no hematemesis; No diarrhea or constipation. No melena or hematochezia.  GENITOURINARY: No dysuria or hematuria, urinary frequency  MUSCULOSKELETAL: Pain in L arm. extending from neck to hand due to operation.  NEUROLOGICAL: No headaches, memory loss, loss of strength, numbness, or tremors  SKIN: improving rash located on RLE and LUE, pattern consistent with red tattoo dye. Tender, non erythematous nodule on L upper thigh    MEDICATIONS  (STANDING):  atorvastatin 40 milliGRAM(s) Oral at bedtime  dextrose 50% Injectable 25 Gram(s) IV Push once  folic acid 1 milliGRAM(s) Oral daily  gabapentin 300 milliGRAM(s) Oral at bedtime  glucagon  Injectable 1 milliGRAM(s) IntraMuscular once  heparin   Injectable 5000 Unit(s) SubCutaneous every 8 hours  hydrocortisone 1% Cream 1 Application(s) Topical two times a day  insulin lispro (ADMELOG) corrective regimen sliding scale   SubCutaneous three times a day before meals  metoprolol tartrate 50 milliGRAM(s) Oral two times a day  mirtazapine 15 milliGRAM(s) Oral at bedtime  NIFEdipine XL 30 milliGRAM(s) Oral daily  QUEtiapine 50 milliGRAM(s) Oral at bedtime  risperiDONE   Tablet 2 milliGRAM(s) Oral daily  senna 2 Tablet(s) Oral at bedtime  sevelamer carbonate 800 milliGRAM(s) Oral three times a day with meals    MEDICATIONS  (PRN):  acetaminophen   Tablet .. 650 milliGRAM(s) Oral every 6 hours PRN Temp greater or equal to 38C (100.4F), Mild Pain (1 - 3)  ALBUTerol    90 MICROgram(s) HFA Inhaler 2 Puff(s) Inhalation every 6 hours PRN Shortness of Breath and/or Wheezing  HYDROmorphone   Tablet 1 milliGRAM(s) Oral every 6 hours PRN Severe Pain (7 - 10)  ondansetron Injectable 4 milliGRAM(s) IV Push every 8 hours PRN Nausea and/or Vomiting  oxycodone    5 mG/acetaminophen 325 mG 1 Tablet(s) Oral every 4 hours PRN Moderate Pain (4 - 6)      Vital Signs Last 24 Hrs  T(C): 36.8 (12 Feb 2021 03:23), Max: 36.9 (11 Feb 2021 21:50)  T(F): 98.2 (12 Feb 2021 03:23), Max: 98.5 (11 Feb 2021 21:50)  HR: 77 (12 Feb 2021 06:04) (59 - 77)  BP: 157/81 (12 Feb 2021 06:04) (123/85 - 160/75)  BP(mean): 101 (11 Feb 2021 21:00) (87 - 109)  RR: 17 (12 Feb 2021 03:23) (13 - 19)  SpO2: 93% (12 Feb 2021 03:23) (93% - 100%)    PHYSICAL EXAMINATION:  GENERAL: obese male with NC in place at 3L, discomfort due to pain on Left arm  HEAD:  Atraumatic, Normocephalic  EYES:  conjunctiva and sclera clear, no scleral icterus  NECK: Supple, No JVD, Normal thyroid  CHEST/LUNG: Clear to auscultation.  No rales, rhonchi, wheezing, or rubs  HEART: Regular rate and rhythm; mild systolic murmur appreciated  ABDOMEN: Soft, Nontender, Nondistended; Bowel sounds present  NERVOUS SYSTEM:  Alert & Oriented X3, sensation intact  EXTREMITIES: Av fistula site with dressing clean and intact, tenderness to palpation, generalized swelling. LIJ bypass site with dressing in place. R sided permacath with dressing in place. MAURIZIO drain in place with bloody fluid collection. 2+ Peripheral Pulses, No clubbing, cyanosis, or edema  SKIN: improving rash on LUE and RLE  MSK: limited ROM in ULE due to pain. left hand ROM normal.                          11.4   8.47  )-----------( 183      ( 12 Feb 2021 07:38 )             37.8     02-12    126<L>  |  91<L>  |  67<H>  ----------------------------<  82  6.7<HH>   |  21<L>  |  10.40<H>    Ca    9.2      12 Feb 2021 07:38  Phos  8.8     02-12  Mg     2.2     02-12    TPro  7.7  /  Alb  3.0<L>  /  TBili  0.4  /  DBili  x   /  AST  19  /  ALT  20  /  AlkPhos  130<H>  02-12    LIVER FUNCTIONS - ( 12 Feb 2021 07:38 )  Alb: 3.0 g/dL / Pro: 7.7 g/dL / ALK PHOS: 130 U/L / ALT: 20 U/L DA / AST: 19 U/L / GGT: x               PT/INR - ( 11 Feb 2021 06:49 )   PT: 11.9 sec;   INR: 1.00 ratio         PTT - ( 11 Feb 2021 06:49 )  PTT:36.3 sec  I&O's Summary    11 Feb 2021 07:01  -  12 Feb 2021 07:00  --------------------------------------------------------  IN: 250 mL / OUT: 285 mL / NET: -35 mL          RADIOLOGY & ADDITIONAL TESTS:                   MS3/4 Progress note, Discussed with Resident and Attending      CHIEF COMPLAINT & BRIEF HOSPITAL COURSE: 40 year old man with PMH of DM, ESRD on dialysis, Hypertension, COPD on home oxygen, Bipolar diease and schizophrenia, followed by Dr La closely sent in for AV fistula evaluation post HD 2/8/21 for suspected aneurysm. Patient has been complaining of pain at the AV fistula site for quite sometime and he is sent to hospital for fistula repair on 2/11 and requires medical optimization. He also reports a small scrotal bump/swelling for few days which is painful but not erythematous. He had an abscess drained in the past in same location. 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 Patient advised to follow up with urology outpatient. Patient had Surgery done 2/11, he had a left upper extremity aneurysmal av fistula and is s/p resection of aneurysm and stent with primary repair of cephalic vein in addition to cephalic to internal jugular vein bypass with ptfe and permcath insertion at the right IJ with fluoro guidance.      INTERVAL HPI/OVERNIGHT EVENTS: Pt examined at bedside, reports severe pain in the left arm from neck down to the hand. He can still move his hand but cannot move his arm due to pain. He also complains of one episode of NBNB vomiting and nausea this morning, attributed to his pain and denies hematemesis. R sided pain present at permacath site but less severe than L sided pain. His dressing was saturated and changed this morning. The pt reports cream is helping his rash and it has been improving and less itchy.      REVIEW OF SYSTEMS:  CONSTITUTIONAL: No fever, weight loss, or fatigue  RESPIRATORY: No cough, wheezing, chills or hemoptysis; No shortness of breath  CARDIOVASCULAR: No chest pain, palpitations, dizziness, or leg swelling  GASTROINTESTINAL: No abdominal pain. + nausea and one episode NBNB vomiting, no hematemesis; No diarrhea or constipation. No melena or hematochezia.  GENITOURINARY: No dysuria or hematuria, urinary frequency  MUSCULOSKELETAL: Pain in L arm. extending from neck to hand due to operation.  NEUROLOGICAL: No headaches, memory loss, loss of strength, numbness, or tremors  SKIN: improving rash located on RLE and LUE, pattern consistent with red tattoo dye. Tender, non erythematous nodule on L upper thigh    MEDICATIONS  (STANDING):  atorvastatin 40 milliGRAM(s) Oral at bedtime  dextrose 50% Injectable 25 Gram(s) IV Push once  folic acid 1 milliGRAM(s) Oral daily  gabapentin 300 milliGRAM(s) Oral at bedtime  glucagon  Injectable 1 milliGRAM(s) IntraMuscular once  heparin   Injectable 5000 Unit(s) SubCutaneous every 8 hours  hydrocortisone 1% Cream 1 Application(s) Topical two times a day  insulin lispro (ADMELOG) corrective regimen sliding scale   SubCutaneous three times a day before meals  metoprolol tartrate 50 milliGRAM(s) Oral two times a day  mirtazapine 15 milliGRAM(s) Oral at bedtime  NIFEdipine XL 30 milliGRAM(s) Oral daily  QUEtiapine 50 milliGRAM(s) Oral at bedtime  risperiDONE   Tablet 2 milliGRAM(s) Oral daily  senna 2 Tablet(s) Oral at bedtime  sevelamer carbonate 800 milliGRAM(s) Oral three times a day with meals    MEDICATIONS  (PRN):  acetaminophen   Tablet .. 650 milliGRAM(s) Oral every 6 hours PRN Temp greater or equal to 38C (100.4F), Mild Pain (1 - 3)  ALBUTerol    90 MICROgram(s) HFA Inhaler 2 Puff(s) Inhalation every 6 hours PRN Shortness of Breath and/or Wheezing  HYDROmorphone   Tablet 1 milliGRAM(s) Oral every 6 hours PRN Severe Pain (7 - 10)  ondansetron Injectable 4 milliGRAM(s) IV Push every 8 hours PRN Nausea and/or Vomiting  oxycodone    5 mG/acetaminophen 325 mG 1 Tablet(s) Oral every 4 hours PRN Moderate Pain (4 - 6)      Vital Signs Last 24 Hrs  T(C): 36.8 (12 Feb 2021 03:23), Max: 36.9 (11 Feb 2021 21:50)  T(F): 98.2 (12 Feb 2021 03:23), Max: 98.5 (11 Feb 2021 21:50)  HR: 77 (12 Feb 2021 06:04) (59 - 77)  BP: 157/81 (12 Feb 2021 06:04) (123/85 - 160/75)  BP(mean): 101 (11 Feb 2021 21:00) (87 - 109)  RR: 17 (12 Feb 2021 03:23) (13 - 19)  SpO2: 93% (12 Feb 2021 03:23) (93% - 100%)    PHYSICAL EXAMINATION:  GENERAL: obese male with NC in place at 3L, discomfort due to pain on Left arm  HEAD:  Atraumatic, Normocephalic  EYES:  conjunctiva and sclera clear, no scleral icterus  NECK: Supple, No JVD, Normal thyroid  CHEST/LUNG: Clear to auscultation.  No rales, rhonchi, wheezing, or rubs  HEART: Regular rate and rhythm; mild systolic murmur appreciated  ABDOMEN: Soft, Nontender, Nondistended; Bowel sounds present  NERVOUS SYSTEM:  Alert & Oriented X3, sensation intact  EXTREMITIES: Av fistula site with dressing clean and intact, tenderness to palpation, generalized swelling. LIJ bypass site with dressing in place. R sided permacath with dressing in place. MAURIZIO drain in place with bloody fluid collection. 2+ Peripheral Pulses, No clubbing, cyanosis, or edema  SKIN: improving rash on LUE and RLE  MSK: limited ROM in ULE due to pain. left hand ROM normal.                          11.4   8.47  )-----------( 183      ( 12 Feb 2021 07:38 )             37.8     02-12    126<L>  |  91<L>  |  67<H>  ----------------------------<  82  6.7<HH>   |  21<L>  |  10.40<H>    Ca    9.2      12 Feb 2021 07:38  Phos  8.8     02-12  Mg     2.2     02-12    TPro  7.7  /  Alb  3.0<L>  /  TBili  0.4  /  DBili  x   /  AST  19  /  ALT  20  /  AlkPhos  130<H>  02-12    LIVER FUNCTIONS - ( 12 Feb 2021 07:38 )  Alb: 3.0 g/dL / Pro: 7.7 g/dL / ALK PHOS: 130 U/L / ALT: 20 U/L DA / AST: 19 U/L / GGT: x               PT/INR - ( 11 Feb 2021 06:49 )   PT: 11.9 sec;   INR: 1.00 ratio         PTT - ( 11 Feb 2021 06:49 )  PTT:36.3 sec  I&O's Summary    11 Feb 2021 07:01  -  12 Feb 2021 07:00  --------------------------------------------------------  IN: 250 mL / OUT: 285 mL / NET: -35 mL          RADIOLOGY & ADDITIONAL TESTS:

## 2021-02-12 NOTE — PROGRESS NOTE ADULT - PROBLEM SELECTOR PLAN 2
- s./p dialysis on 2/10  - Dr La is the nephrologist   - He will follow in house for continuation of dialysis  - Monitor electrolytes and BMP  - Adjust medications  - dialysis on 2/12 - Dr La is the nephrologist   - He will follow in house for continuation of dialysis  - Monitor electrolytes and BMP  - Adjust medications  - dialysis on 2/12

## 2021-02-12 NOTE — PROGRESS NOTE ADULT - ASSESSMENT
40y.o. Male s/p resection of LUE aneurysm, L cephalic to L IJ graft bypass, PC placement 2/11    -HD as per [facility]   -Pain medication PRN   -LUE elevation  -MAURIZIO drain care  -DVT ppx  -Incentive spirometry  -Reg diet

## 2021-02-12 NOTE — PROGRESS NOTE ADULT - ASSESSMENT
Patient is a 40y Male whom presented to the hospital with LT arm pain  He has ESRD on HD via a LUEAVF . Presented to ED with painful AVF.  s/p revision of AVF 2/11. With tunneled cath now  ESRD - Maintenance HD schedule MWF. Had HD earlier today, using tunneled cath. 3kg UF achieved; Hyperkalmia corrected with hD. Low K diet. vasc surg f/u for AV access wound care.   # anemia of CKD. HGB stable  # pruritic rash RLE and LUE. chronically elevated phos. cont sevelamer . Getting hydrocortisone cream for symptomatic relief

## 2021-02-13 LAB
ANION GAP SERPL CALC-SCNC: 13 MMOL/L — SIGNIFICANT CHANGE UP (ref 5–17)
BUN SERPL-MCNC: 60 MG/DL — HIGH (ref 7–18)
CALCIUM SERPL-MCNC: 9.7 MG/DL — SIGNIFICANT CHANGE UP (ref 8.4–10.5)
CHLORIDE SERPL-SCNC: 89 MMOL/L — LOW (ref 96–108)
CO2 SERPL-SCNC: 26 MMOL/L — SIGNIFICANT CHANGE UP (ref 22–31)
CREAT SERPL-MCNC: 8.8 MG/DL — HIGH (ref 0.5–1.3)
GLUCOSE BLDC GLUCOMTR-MCNC: 107 MG/DL — HIGH (ref 70–99)
GLUCOSE BLDC GLUCOMTR-MCNC: 118 MG/DL — HIGH (ref 70–99)
GLUCOSE BLDC GLUCOMTR-MCNC: 94 MG/DL — SIGNIFICANT CHANGE UP (ref 70–99)
GLUCOSE BLDC GLUCOMTR-MCNC: 99 MG/DL — SIGNIFICANT CHANGE UP (ref 70–99)
GLUCOSE SERPL-MCNC: 92 MG/DL — SIGNIFICANT CHANGE UP (ref 70–99)
HCT VFR BLD CALC: 35.9 % — LOW (ref 39–50)
HGB BLD-MCNC: 11.3 G/DL — LOW (ref 13–17)
MAGNESIUM SERPL-MCNC: 2.2 MG/DL — SIGNIFICANT CHANGE UP (ref 1.6–2.6)
MCHC RBC-ENTMCNC: 29.3 PG — SIGNIFICANT CHANGE UP (ref 27–34)
MCHC RBC-ENTMCNC: 31.5 GM/DL — LOW (ref 32–36)
MCV RBC AUTO: 93 FL — SIGNIFICANT CHANGE UP (ref 80–100)
NRBC # BLD: 0 /100 WBCS — SIGNIFICANT CHANGE UP (ref 0–0)
PHOSPHATE SERPL-MCNC: 8.4 MG/DL — HIGH (ref 2.5–4.5)
PLATELET # BLD AUTO: 161 K/UL — SIGNIFICANT CHANGE UP (ref 150–400)
POTASSIUM SERPL-MCNC: 5.5 MMOL/L — HIGH (ref 3.5–5.3)
POTASSIUM SERPL-SCNC: 5.5 MMOL/L — HIGH (ref 3.5–5.3)
RBC # BLD: 3.86 M/UL — LOW (ref 4.2–5.8)
RBC # FLD: 14 % — SIGNIFICANT CHANGE UP (ref 10.3–14.5)
SARS-COV-2 RNA SPEC QL NAA+PROBE: SIGNIFICANT CHANGE UP
SODIUM SERPL-SCNC: 128 MMOL/L — LOW (ref 135–145)
WBC # BLD: 7.97 K/UL — SIGNIFICANT CHANGE UP (ref 3.8–10.5)
WBC # FLD AUTO: 7.97 K/UL — SIGNIFICANT CHANGE UP (ref 3.8–10.5)

## 2021-02-13 PROCEDURE — 99232 SBSQ HOSP IP/OBS MODERATE 35: CPT

## 2021-02-13 RX ORDER — SODIUM POLYSTYRENE SULFONATE 4.1 MEQ/G
15 POWDER, FOR SUSPENSION ORAL ONCE
Refills: 0 | Status: COMPLETED | OUTPATIENT
Start: 2021-02-13 | End: 2021-02-13

## 2021-02-13 RX ADMIN — Medication 50 MILLIGRAM(S): at 05:22

## 2021-02-13 RX ADMIN — ATORVASTATIN CALCIUM 40 MILLIGRAM(S): 80 TABLET, FILM COATED ORAL at 21:01

## 2021-02-13 RX ADMIN — SEVELAMER CARBONATE 800 MILLIGRAM(S): 2400 POWDER, FOR SUSPENSION ORAL at 17:44

## 2021-02-13 RX ADMIN — Medication 1 APPLICATION(S): at 05:22

## 2021-02-13 RX ADMIN — HEPARIN SODIUM 5000 UNIT(S): 5000 INJECTION INTRAVENOUS; SUBCUTANEOUS at 13:12

## 2021-02-13 RX ADMIN — Medication 1 APPLICATION(S): at 17:45

## 2021-02-13 RX ADMIN — Medication 50 MILLIGRAM(S): at 17:45

## 2021-02-13 RX ADMIN — GABAPENTIN 300 MILLIGRAM(S): 400 CAPSULE ORAL at 20:57

## 2021-02-13 RX ADMIN — HYDROMORPHONE HYDROCHLORIDE 1 MILLIGRAM(S): 2 INJECTION INTRAMUSCULAR; INTRAVENOUS; SUBCUTANEOUS at 15:05

## 2021-02-13 RX ADMIN — ONDANSETRON 4 MILLIGRAM(S): 8 TABLET, FILM COATED ORAL at 08:39

## 2021-02-13 RX ADMIN — Medication 1 MILLIGRAM(S): at 10:46

## 2021-02-13 RX ADMIN — ONDANSETRON 4 MILLIGRAM(S): 8 TABLET, FILM COATED ORAL at 17:53

## 2021-02-13 RX ADMIN — SEVELAMER CARBONATE 800 MILLIGRAM(S): 2400 POWDER, FOR SUSPENSION ORAL at 10:46

## 2021-02-13 RX ADMIN — MIRTAZAPINE 15 MILLIGRAM(S): 45 TABLET, ORALLY DISINTEGRATING ORAL at 20:58

## 2021-02-13 RX ADMIN — HYDROMORPHONE HYDROCHLORIDE 1 MILLIGRAM(S): 2 INJECTION INTRAMUSCULAR; INTRAVENOUS; SUBCUTANEOUS at 20:57

## 2021-02-13 RX ADMIN — HEPARIN SODIUM 5000 UNIT(S): 5000 INJECTION INTRAVENOUS; SUBCUTANEOUS at 05:22

## 2021-02-13 RX ADMIN — Medication 30 MILLIGRAM(S): at 05:21

## 2021-02-13 RX ADMIN — HYDROMORPHONE HYDROCHLORIDE 1 MILLIGRAM(S): 2 INJECTION INTRAMUSCULAR; INTRAVENOUS; SUBCUTANEOUS at 09:30

## 2021-02-13 RX ADMIN — RISPERIDONE 2 MILLIGRAM(S): 4 TABLET ORAL at 10:46

## 2021-02-13 RX ADMIN — SODIUM POLYSTYRENE SULFONATE 15 GRAM(S): 4.1 POWDER, FOR SUSPENSION ORAL at 10:45

## 2021-02-13 RX ADMIN — SEVELAMER CARBONATE 800 MILLIGRAM(S): 2400 POWDER, FOR SUSPENSION ORAL at 08:33

## 2021-02-13 RX ADMIN — HEPARIN SODIUM 5000 UNIT(S): 5000 INJECTION INTRAVENOUS; SUBCUTANEOUS at 20:58

## 2021-02-13 RX ADMIN — HYDROMORPHONE HYDROCHLORIDE 1 MILLIGRAM(S): 2 INJECTION INTRAMUSCULAR; INTRAVENOUS; SUBCUTANEOUS at 05:21

## 2021-02-13 RX ADMIN — QUETIAPINE FUMARATE 50 MILLIGRAM(S): 200 TABLET, FILM COATED ORAL at 20:58

## 2021-02-13 RX ADMIN — SENNA PLUS 2 TABLET(S): 8.6 TABLET ORAL at 20:58

## 2021-02-13 NOTE — PROGRESS NOTE ADULT - ASSESSMENT
A/P  ESRD ON HD  ,  WAS  DIALYZED    YESTERDAY,     FRO  REGULAR  HD ON   MON  OFF SIOBHAN, HB GOOD    S/P  REPAIR OF  L  UPPER ARM  AVF,  ANEURYSM  REPAIR   DIALYZED VIA PC  FOR  NOW   BP  IS  CONTROLLED   SHOULD LIMIT HIGH K  FOODS,  K  BORDERLINE   LIMIT FLUID INTAKE

## 2021-02-13 NOTE — PROGRESS NOTE ADULT - SUBJECTIVE AND OBJECTIVE BOX
Patient seen and examined this morning, states he's still in pain from the surgery but otherwise denies chest pain, sob, cough, nausea, vomiting, abdominal pain.    acetaminophen   Tablet .. 650 milliGRAM(s) Oral every 6 hours PRN  ALBUTerol    90 MICROgram(s) HFA Inhaler 2 Puff(s) Inhalation every 6 hours PRN  atorvastatin 40 milliGRAM(s) Oral at bedtime  dextrose 50% Injectable 25 Gram(s) IV Push once  folic acid 1 milliGRAM(s) Oral daily  gabapentin 300 milliGRAM(s) Oral at bedtime  glucagon  Injectable 1 milliGRAM(s) IntraMuscular once  heparin   Injectable 5000 Unit(s) SubCutaneous every 8 hours  hydrocortisone 1% Cream 1 Application(s) Topical two times a day  HYDROmorphone  Injectable 1 milliGRAM(s) IV Push every 4 hours PRN  insulin lispro (ADMELOG) corrective regimen sliding scale   SubCutaneous three times a day before meals  metoprolol tartrate 50 milliGRAM(s) Oral two times a day  mirtazapine 15 milliGRAM(s) Oral at bedtime  NIFEdipine XL 30 milliGRAM(s) Oral daily  ondansetron Injectable 4 milliGRAM(s) IV Push every 8 hours PRN  oxycodone    5 mG/acetaminophen 325 mG 1 Tablet(s) Oral every 4 hours PRN  QUEtiapine 50 milliGRAM(s) Oral at bedtime  risperiDONE   Tablet 2 milliGRAM(s) Oral daily  senna 2 Tablet(s) Oral at bedtime  sevelamer carbonate 800 milliGRAM(s) Oral three times a day with meals      VITALS:  Vital Signs Last 24 Hrs  T(C): 36.1 (13 Feb 2021 13:48), Max: 36.9 (12 Feb 2021 21:22)  T(F): 97 (13 Feb 2021 13:48), Max: 98.4 (12 Feb 2021 21:22)  HR: 63 (13 Feb 2021 13:48) (63 - 72)  BP: 164/76 (13 Feb 2021 13:48) (136/80 - 167/81)  BP(mean): --  RR: 18 (13 Feb 2021 13:48) (16 - 18)  SpO2: 100% (13 Feb 2021 13:48) (100% - 100%)    EXAM:  GEN: morbidly obese male, alert, in no acute distress  CVS: rrr, normal s1/s2  RESP: on 3L O2 via NC, clear bilaterally  ABD: soft, nontender, nondistended, normoactive bowel sounds  EXT: no LE edema  SKIN: dry dressing over LUE AVF and left neck, MAURIZIO drain in place, improved rash in left forearm and right lower ext, right chest permacath clean and dry  NEURO: aaox3, no focal deficits    LABS:                        11.3   7.97  )-----------( 161      ( 13 Feb 2021 06:35 )             35.9     02-13    128<L>  |  89<L>  |  60<H>  ----------------------------<  92  5.5<H>   |  26  |  8.80<H>    Ca    9.7      13 Feb 2021 06:35  Phos  8.4     02-13  Mg     2.2     02-13    TPro  7.7  /  Alb  3.0<L>  /  TBili  0.4  /  DBili  x   /  AST  19  /  ALT  20  /  AlkPhos  130<H>  02-12      IMAGING: reviewed

## 2021-02-13 NOTE — PROGRESS NOTE ADULT - SUBJECTIVE AND OBJECTIVE BOX
INTERVAL HPI/OVERNIGHT EVENTS:  s/p AVF aneuyrsm resection and stent placement with primary repair of cephalic vein, cephalic to IJ vein bypass, and permacath placement POD#2  Patient seen and examined, no acute events overnight   c/o left arm and incisional pain    MEDICATIONS  (STANDING):  atorvastatin 40 milliGRAM(s) Oral at bedtime  dextrose 50% Injectable 25 Gram(s) IV Push once  folic acid 1 milliGRAM(s) Oral daily  gabapentin 300 milliGRAM(s) Oral at bedtime  glucagon  Injectable 1 milliGRAM(s) IntraMuscular once  heparin   Injectable 5000 Unit(s) SubCutaneous every 8 hours  hydrocortisone 1% Cream 1 Application(s) Topical two times a day  insulin lispro (ADMELOG) corrective regimen sliding scale   SubCutaneous three times a day before meals  metoprolol tartrate 50 milliGRAM(s) Oral two times a day  mirtazapine 15 milliGRAM(s) Oral at bedtime  NIFEdipine XL 30 milliGRAM(s) Oral daily  QUEtiapine 50 milliGRAM(s) Oral at bedtime  risperiDONE   Tablet 2 milliGRAM(s) Oral daily  senna 2 Tablet(s) Oral at bedtime  sevelamer carbonate 800 milliGRAM(s) Oral three times a day with meals    MEDICATIONS  (PRN):  acetaminophen   Tablet .. 650 milliGRAM(s) Oral every 6 hours PRN Temp greater or equal to 38C (100.4F), Mild Pain (1 - 3)  ALBUTerol    90 MICROgram(s) HFA Inhaler 2 Puff(s) Inhalation every 6 hours PRN Shortness of Breath and/or Wheezing  HYDROmorphone  Injectable 1 milliGRAM(s) IV Push every 4 hours PRN Severe Pain (7 - 10)  ondansetron Injectable 4 milliGRAM(s) IV Push every 8 hours PRN Nausea and/or Vomiting  oxycodone    5 mG/acetaminophen 325 mG 1 Tablet(s) Oral every 4 hours PRN Moderate Pain (4 - 6)      Vital Signs Last 24 Hrs  T(C): 36.1 (13 Feb 2021 13:48), Max: 36.9 (12 Feb 2021 21:22)  T(F): 97 (13 Feb 2021 13:48), Max: 98.4 (12 Feb 2021 21:22)  HR: 62 (13 Feb 2021 17:44) (62 - 72)  BP: 160/98 (13 Feb 2021 17:44) (136/80 - 167/81)  BP(mean): --  RR: 18 (13 Feb 2021 13:48) (16 - 18)  SpO2: 100% (13 Feb 2021 13:48) (100% - 100%)    Physical Exam  General: AAOx3, No acute distress  Neck: dressing c/d/i, min TTP   Chest: rt PC in place, dressing c/d/i; MAURIZIO in place, dressing c/d/i. Left chest wound w/ sutures in place, no active bleeding, dry dressing applied.  LUE: Warm, motor and sensory intact; Radial pulse 2+.  Dressing over site of AVF with saturated dressing, taken down and replaced. No discharge, no active bleeding.    I&O's Detail      LABS:                        11.3   7.97  )-----------( 161      ( 13 Feb 2021 06:35 )             35.9             02-13    128<L>  |  89<L>  |  60<H>  ----------------------------<  92  5.5<H>   |  26  |  8.80<H>    Ca    9.7      13 Feb 2021 06:35  Phos  8.4     02-13  Mg     2.2     02-13    TPro  7.7  /  Alb  3.0<L>  /  TBili  0.4  /  DBili  x   /  AST  19  /  ALT  20  /  AlkPhos  130<H>  02-12

## 2021-02-13 NOTE — PROGRESS NOTE ADULT - SUBJECTIVE AND OBJECTIVE BOX
Patient is a 40y Male with  ESRD ON  HD    RESTING COMFORTABLY  NO COMPLAINTS AT THE TIME OF  EXAM    aspirin (Swelling)  fish (Unknown)  penicillin (Rash)  penicillins (Swelling)  shellfish (Rash)  shellfish (Unknown)    Hospital Medications:   MEDICATIONS  (STANDING):  atorvastatin 40 milliGRAM(s) Oral at bedtime  dextrose 50% Injectable 25 Gram(s) IV Push once  folic acid 1 milliGRAM(s) Oral daily  gabapentin 300 milliGRAM(s) Oral at bedtime  glucagon  Injectable 1 milliGRAM(s) IntraMuscular once  heparin   Injectable 5000 Unit(s) SubCutaneous every 8 hours  hydrocortisone 1% Cream 1 Application(s) Topical two times a day  insulin lispro (ADMELOG) corrective regimen sliding scale   SubCutaneous three times a day before meals  metoprolol tartrate 50 milliGRAM(s) Oral two times a day  mirtazapine 15 milliGRAM(s) Oral at bedtime  NIFEdipine XL 30 milliGRAM(s) Oral daily  QUEtiapine 50 milliGRAM(s) Oral at bedtime  risperiDONE   Tablet 2 milliGRAM(s) Oral daily  senna 2 Tablet(s) Oral at bedtime  sevelamer carbonate 800 milliGRAM(s) Oral three times a day with meals    REVIEW OF SYSTEMS:  HAS NO   FEVER  CHILLS   NO   SOB  OR  COUGH   NO CH  PAIN  / PALPITATIONS   APPETITE IS  OK, NO  N/V  OR  ABD  PAIN  HAS LITTLE  URINE OUTPUT   NO LEG  SWELLING    VITALS:  T(F): 97 (21 @ 13:48), Max: 98.5 (21 @ 14:30)  HR: 63 (21 @ 13:48)  BP: 164/76 (21 @ 13:48)  RR: 18 (21 @ 13:48)  SpO2: 100% (21 @ 13:48)  Wt(kg): --      PHYSICAL EXAM:  Constitutional: NAD  HEENT: CONJ PINK  Neck: No JVD, R IJ  PC IN  PLACE  Respiratory: CTAB, no wheezes, rales or rhonchi  Cardiovascular: S1, S2, RRR  Gastrointestinal: BS+, soft, NT/ND, OBESE  Extremities:   1 + peripheral edema,  HAS STASIS DERMATITIS LOWER  EXT  Neurological: A/O x 3,   :  No hernandez.     Vascular Access:  S/P  REVISION AVF  L  UPPER ARM  AVF, ,  PIG  TAIL  DRAIN  IN  PLACE  L  UPPER CHEST  R  IJ  PC    LABS:      128<L>  |  89<L>  |  60<H>  ----------------------------<  92  5.5<H>   |  26  |  8.80<H>    Ca    9.7      2021 06:35  Phos  8.4       Mg     2.2         TPro  7.7  /  Alb  3.0<L>  /  TBili  0.4  /  DBili      /  AST  19  /  ALT  20  /  AlkPhos  130<H>  -12    Creatinine Trend: 8.80 <--, 7.21 <--, 10.40 <--, 8.39 <--, 9.24 <--, 7.03 <--, 6.07 <--                        11.3   7.97  )-----------( 161      ( 2021 06:35 )             35.9     Urine Studies:  Urinalysis Basic - ( 2021 19:15 )    Color: Yellow / Appearance: Clear / S.010 / pH:   Gluc:  / Ketone: Negative  / Bili: Negative / Urobili: Negative   Blood:  / Protein: 500 mg/dL / Nitrite: Negative   Leuk Esterase: Negative / RBC: 2-5 /HPF / WBC 0-2 /HPF   Sq Epi:  / Non Sq Epi: Occasional /HPF / Bacteria: Trace /HPF        RADIOLOGY & ADDITIONAL STUDIES:

## 2021-02-13 NOTE — PROGRESS NOTE ADULT - ASSESSMENT
40y.o. Male s/p resection of LUE aneurysm, L cephalic to L IJ graft bypass, PC placement 2/11    -HD as per Renal via Permacath  -Pain medication PRN   -Monitor drain output  -DVT ppx  -Incentive spirometry

## 2021-02-13 NOTE — PROGRESS NOTE ADULT - ASSESSMENT
39 y/o male with ESRD admitted for Left AVF revision due to aneurysm, went to the OR for AVF revision and Permacath placement on 2/11 with vascular surgery Dr. Foote, now has a MAURIZIO drain in place, vascular following and will change dressing. Continue current pain regimen with prn IV dilaudid x1 more day. Continue with his chronic home O2 at 3L. Testicular US showed microlithiasis, need outpatient urology follow up and repeat Testicular US in 6-12 months. Continue dialysis on M/W/F schedule. Continue hydrocortisone cream for pruritis x 2 weeks, is improving, will need outpatient follow up with Dermatology. Continue monitoring postoperatively, can be discharged home once cleared by vascular. He will need reinstatement of his dialysis prior to discharge, plan to dialyze on Monday morning and possible discharge home afterwards if cleared by vascular.

## 2021-02-14 LAB
ANION GAP SERPL CALC-SCNC: 15 MMOL/L — SIGNIFICANT CHANGE UP (ref 5–17)
BUN SERPL-MCNC: 72 MG/DL — HIGH (ref 7–18)
CALCIUM SERPL-MCNC: 9.8 MG/DL — SIGNIFICANT CHANGE UP (ref 8.4–10.5)
CHLORIDE SERPL-SCNC: 88 MMOL/L — LOW (ref 96–108)
CO2 SERPL-SCNC: 24 MMOL/L — SIGNIFICANT CHANGE UP (ref 22–31)
CREAT SERPL-MCNC: 10.9 MG/DL — HIGH (ref 0.5–1.3)
GLUCOSE BLDC GLUCOMTR-MCNC: 105 MG/DL — HIGH (ref 70–99)
GLUCOSE BLDC GLUCOMTR-MCNC: 119 MG/DL — HIGH (ref 70–99)
GLUCOSE BLDC GLUCOMTR-MCNC: 123 MG/DL — HIGH (ref 70–99)
GLUCOSE BLDC GLUCOMTR-MCNC: 152 MG/DL — HIGH (ref 70–99)
GLUCOSE SERPL-MCNC: 96 MG/DL — SIGNIFICANT CHANGE UP (ref 70–99)
HCT VFR BLD CALC: 32.4 % — LOW (ref 39–50)
HGB BLD-MCNC: 10.4 G/DL — LOW (ref 13–17)
MAGNESIUM SERPL-MCNC: 2.4 MG/DL — SIGNIFICANT CHANGE UP (ref 1.6–2.6)
MCHC RBC-ENTMCNC: 29.5 PG — SIGNIFICANT CHANGE UP (ref 27–34)
MCHC RBC-ENTMCNC: 32.1 GM/DL — SIGNIFICANT CHANGE UP (ref 32–36)
MCV RBC AUTO: 92 FL — SIGNIFICANT CHANGE UP (ref 80–100)
NRBC # BLD: 0 /100 WBCS — SIGNIFICANT CHANGE UP (ref 0–0)
PHOSPHATE SERPL-MCNC: 10.1 MG/DL — HIGH (ref 2.5–4.5)
PLATELET # BLD AUTO: 172 K/UL — SIGNIFICANT CHANGE UP (ref 150–400)
POTASSIUM SERPL-MCNC: 5.7 MMOL/L — HIGH (ref 3.5–5.3)
POTASSIUM SERPL-SCNC: 5.7 MMOL/L — HIGH (ref 3.5–5.3)
RBC # BLD: 3.52 M/UL — LOW (ref 4.2–5.8)
RBC # FLD: 14.2 % — SIGNIFICANT CHANGE UP (ref 10.3–14.5)
SODIUM SERPL-SCNC: 127 MMOL/L — LOW (ref 135–145)
WBC # BLD: 8.26 K/UL — SIGNIFICANT CHANGE UP (ref 3.8–10.5)
WBC # FLD AUTO: 8.26 K/UL — SIGNIFICANT CHANGE UP (ref 3.8–10.5)

## 2021-02-14 PROCEDURE — 99232 SBSQ HOSP IP/OBS MODERATE 35: CPT | Mod: GC

## 2021-02-14 RX ORDER — SODIUM POLYSTYRENE SULFONATE 4.1 MEQ/G
30 POWDER, FOR SUSPENSION ORAL ONCE
Refills: 0 | Status: COMPLETED | OUTPATIENT
Start: 2021-02-14 | End: 2021-02-14

## 2021-02-14 RX ORDER — SEVELAMER CARBONATE 2400 MG/1
1600 POWDER, FOR SUSPENSION ORAL
Refills: 0 | Status: DISCONTINUED | OUTPATIENT
Start: 2021-02-14 | End: 2021-02-15

## 2021-02-14 RX ADMIN — HEPARIN SODIUM 5000 UNIT(S): 5000 INJECTION INTRAVENOUS; SUBCUTANEOUS at 05:35

## 2021-02-14 RX ADMIN — QUETIAPINE FUMARATE 50 MILLIGRAM(S): 200 TABLET, FILM COATED ORAL at 22:00

## 2021-02-14 RX ADMIN — SEVELAMER CARBONATE 1600 MILLIGRAM(S): 2400 POWDER, FOR SUSPENSION ORAL at 16:47

## 2021-02-14 RX ADMIN — SEVELAMER CARBONATE 800 MILLIGRAM(S): 2400 POWDER, FOR SUSPENSION ORAL at 11:05

## 2021-02-14 RX ADMIN — MIRTAZAPINE 15 MILLIGRAM(S): 45 TABLET, ORALLY DISINTEGRATING ORAL at 22:00

## 2021-02-14 RX ADMIN — Medication 1 APPLICATION(S): at 05:34

## 2021-02-14 RX ADMIN — SEVELAMER CARBONATE 800 MILLIGRAM(S): 2400 POWDER, FOR SUSPENSION ORAL at 08:27

## 2021-02-14 RX ADMIN — HYDROMORPHONE HYDROCHLORIDE 1 MILLIGRAM(S): 2 INJECTION INTRAMUSCULAR; INTRAVENOUS; SUBCUTANEOUS at 05:34

## 2021-02-14 RX ADMIN — SENNA PLUS 2 TABLET(S): 8.6 TABLET ORAL at 21:59

## 2021-02-14 RX ADMIN — HYDROMORPHONE HYDROCHLORIDE 1 MILLIGRAM(S): 2 INJECTION INTRAMUSCULAR; INTRAVENOUS; SUBCUTANEOUS at 22:00

## 2021-02-14 RX ADMIN — Medication 1: at 12:22

## 2021-02-14 RX ADMIN — HYDROMORPHONE HYDROCHLORIDE 1 MILLIGRAM(S): 2 INJECTION INTRAMUSCULAR; INTRAVENOUS; SUBCUTANEOUS at 11:04

## 2021-02-14 RX ADMIN — Medication 30 MILLIGRAM(S): at 05:34

## 2021-02-14 RX ADMIN — HEPARIN SODIUM 5000 UNIT(S): 5000 INJECTION INTRAVENOUS; SUBCUTANEOUS at 22:17

## 2021-02-14 RX ADMIN — ONDANSETRON 4 MILLIGRAM(S): 8 TABLET, FILM COATED ORAL at 11:16

## 2021-02-14 RX ADMIN — HYDROMORPHONE HYDROCHLORIDE 1 MILLIGRAM(S): 2 INJECTION INTRAMUSCULAR; INTRAVENOUS; SUBCUTANEOUS at 01:18

## 2021-02-14 RX ADMIN — Medication 1 APPLICATION(S): at 16:48

## 2021-02-14 RX ADMIN — Medication 1 MILLIGRAM(S): at 11:04

## 2021-02-14 RX ADMIN — HYDROMORPHONE HYDROCHLORIDE 1 MILLIGRAM(S): 2 INJECTION INTRAMUSCULAR; INTRAVENOUS; SUBCUTANEOUS at 16:47

## 2021-02-14 RX ADMIN — RISPERIDONE 2 MILLIGRAM(S): 4 TABLET ORAL at 11:05

## 2021-02-14 RX ADMIN — Medication 50 MILLIGRAM(S): at 16:50

## 2021-02-14 RX ADMIN — HEPARIN SODIUM 5000 UNIT(S): 5000 INJECTION INTRAVENOUS; SUBCUTANEOUS at 12:23

## 2021-02-14 RX ADMIN — GABAPENTIN 300 MILLIGRAM(S): 400 CAPSULE ORAL at 22:16

## 2021-02-14 RX ADMIN — ATORVASTATIN CALCIUM 40 MILLIGRAM(S): 80 TABLET, FILM COATED ORAL at 22:16

## 2021-02-14 RX ADMIN — Medication 50 MILLIGRAM(S): at 05:34

## 2021-02-14 RX ADMIN — SODIUM POLYSTYRENE SULFONATE 30 GRAM(S): 4.1 POWDER, FOR SUSPENSION ORAL at 12:27

## 2021-02-14 NOTE — PROGRESS NOTE ADULT - PROBLEM SELECTOR PLAN 1
- Patient has left arm fistula, with palpable thrill  - Increased pain for sometime, no improvement  - Pain control with tylenol and percocet, dilaudid for severe pain  - Vascular follow up Dr Foote  - Surgery on 2/11, POD 1, left upper extremity aneurysmal avf s/p resection of aneuyrsm and stent with primary repair of cephalic vein in addition to cephalic to internal jugular vein bypass with ptfe   -permcath insertion at the right IJ with fluoro guidance  - LUE elevation  - dressing change prn  - MAURIZIO drain DC'ed by vascular surgery on 2/14/21

## 2021-02-14 NOTE — PROGRESS NOTE ADULT - PROBLEM SELECTOR PLAN 4
- Patient has small skin swelling around 1 cm on scrotum, tender to palpation.   - questionable if on skin, epididymis or vasculature, not on testicle.  - non-erythematous, no discharge  - Will start bactrim PO  - ESR elevated at 27   - scrotal US shows microlithiasis, needs urologic follow up and repeat US 6-12 mo outpatient  - continue pain management  - new similar nodule on L upper inner thigh likely ingrown hair.

## 2021-02-14 NOTE — PROGRESS NOTE ADULT - SUBJECTIVE AND OBJECTIVE BOX
PGY-1 Progress Note discussed with attending    PAGER #: [[phone]] TILL 5:00 PM  PLEASE CONTACT ON CALL TEAM:  - On Call Team (Please refer to Janine) FROM 5:00 PM - 8:30PM  - Nightfloat Team FROM 8:30 -7:30 AM    CHIEF COMPLAINT & BRIEF HOSPITAL COURSE:  40 year old man with PMH of DM, ESRD on dialysis, Hypertension, COPD on home oxygen, Bipolar diease and schizophrenia, followed by Dr La closely sent in for AV fistula evaluation post HD 2/8/21 for suspected aneurysm. Patient has been complaining of pain at the AV fistula site for quite sometime and he is sent to hospital for fistula repair on 2/11 and requires medical optimization. He also reports a small scrotal bump/swelling for few days which is painful but not erythematous. He had an abscess drained in the past in same location. 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 Patient advised to follow up with urology outpatient. Patient had Surgery done 2/11, he had a left upper extremity aneurysmal av fistula and is s/p resection of aneurysm and stent with primary repair of cephalic vein in addition to cephalic to internal jugular vein bypass with ptfe and permcath insertion at the right IJ with fluoro guidance.    INTERVAL HPI/OVERNIGHT EVENTS:   No events overnight, patient seen and examined at bedside, complains of pain on the surgical site, dressing is clean, no discahrge or hematoma noted, He denies any other complains, is tolerating diet and having regular BMs. His electrolytes are deranged, Nephro saw patient, plan is for dialysis tomorrow.     REVIEW OF SYSTEMS:  CONSTITUTIONAL: No fever, weight loss, or fatigue  RESPIRATORY: No cough, wheezing, chills or hemoptysis; No shortness of breath  CARDIOVASCULAR: No chest pain, palpitations, dizziness, or leg swelling  GASTROINTESTINAL: No abdominal pain. No nausea, vomiting,  no hematemesis; No diarrhea or constipation. No melena or hematochezia.  GENITOURINARY: No dysuria or hematuria, urinary frequency  MUSCULOSKELETAL: Pain in L arm. at area of surgical intervention, limited ROM due to pain.  NEUROLOGICAL: No headaches, memory loss, loss of strength, numbness, or tremors  SKIN: improving rash located on RLE and LUE, pattern consistent with red tattoo dye. Tender, non erythematous nodule on L upper thigh    MEDICATIONS  (STANDING):  atorvastatin 40 milliGRAM(s) Oral at bedtime  dextrose 50% Injectable 25 Gram(s) IV Push once  folic acid 1 milliGRAM(s) Oral daily  gabapentin 300 milliGRAM(s) Oral at bedtime  glucagon  Injectable 1 milliGRAM(s) IntraMuscular once  heparin   Injectable 5000 Unit(s) SubCutaneous every 8 hours  hydrocortisone 1% Cream 1 Application(s) Topical two times a day  insulin lispro (ADMELOG) corrective regimen sliding scale   SubCutaneous three times a day before meals  metoprolol tartrate 50 milliGRAM(s) Oral two times a day  mirtazapine 15 milliGRAM(s) Oral at bedtime  NIFEdipine XL 30 milliGRAM(s) Oral daily  QUEtiapine 50 milliGRAM(s) Oral at bedtime  risperiDONE   Tablet 2 milliGRAM(s) Oral daily  senna 2 Tablet(s) Oral at bedtime  sevelamer carbonate 1600 milliGRAM(s) Oral three times a day with meals    MEDICATIONS  (PRN):  acetaminophen   Tablet .. 650 milliGRAM(s) Oral every 6 hours PRN Temp greater or equal to 38C (100.4F), Mild Pain (1 - 3)  ALBUTerol    90 MICROgram(s) HFA Inhaler 2 Puff(s) Inhalation every 6 hours PRN Shortness of Breath and/or Wheezing  HYDROmorphone  Injectable 1 milliGRAM(s) IV Push every 4 hours PRN Severe Pain (7 - 10)  ondansetron Injectable 4 milliGRAM(s) IV Push every 8 hours PRN Nausea and/or Vomiting  oxycodone    5 mG/acetaminophen 325 mG 1 Tablet(s) Oral every 4 hours PRN Moderate Pain (4 - 6)      Vital Signs Last 24 Hrs  T(C): 36.9 (14 Feb 2021 13:49), Max: 36.9 (13 Feb 2021 20:54)  T(F): 98.5 (14 Feb 2021 13:49), Max: 98.5 (14 Feb 2021 13:49)  HR: 65 (14 Feb 2021 13:49) (62 - 69)  BP: 144/76 (14 Feb 2021 13:49) (144/76 - 160/98)  BP(mean): --  RR: 19 (14 Feb 2021 13:49) (16 - 19)  SpO2: 97% (14 Feb 2021 13:49) (97% - 98%)    PHYSICAL EXAMINATION:  GENERAL: obese male with NC in place, NAD  HEAD:  Atraumatic, Normocephalic  EYES:  conjunctiva and sclera clear, no scleral icterus  NECK: Supple, No JVD, Normal thyroid  CHEST/LUNG: Clear to auscultation.  No rales, rhonchi, wheezing, or rubs  HEART: Regular rate and rhythm; mild systolic murmur appreciated  ABDOMEN: Soft, Nontender, Nondistended; Bowel sounds present  NERVOUS SYSTEM:  Alert & Oriented X3, sensation intact, strength limited due to pain on Left upper extremity  EXTREMITIES: Av fistula site with dressing clean and intact, tenderness to palpation, generalized swelling. LIJ bypass site with dressing in place. R sided permacath with dressing in place. MAURIZIO drain in place with bloody fluid collection. 2+ Peripheral Pulses, No clubbing, cyanosis, or edema  SKIN: improving rash on LUE and Lower extremities bilaterally.  MSK: limited ROM in ULE due to pain. left hand ROM normal.                          10.4   8.26  )-----------( 172      ( 14 Feb 2021 07:29 )             32.4     02-14    127<L>  |  88<L>  |  72<H>  ----------------------------<  96  5.7<H>   |  24  |  10.90<H>    Ca    9.8      14 Feb 2021 07:29  Phos  10.1     02-14  Mg     2.4     02-14              I&O's Summary    13 Feb 2021 07:01  -  14 Feb 2021 07:00  --------------------------------------------------------  IN: 0 mL / OUT: 12.5 mL / NET: -12.5 mL          RADIOLOGY & ADDITIONAL TESTS:

## 2021-02-14 NOTE — PROGRESS NOTE ADULT - ASSESSMENT
40 year old man with PMH of DM, ESRD on dialysis, Hypertension, COPD, Bipolar diease and schizophrenia, followed by Dr La closely sent in for AV fistula evaluation post HD today for suspected aneurysm. Patient is admitted for AV fistula repair/revision.  s/p resection of LUE aneurysm, L cephalic to L IJ graft bypass, PC placement 2/11.

## 2021-02-14 NOTE — PROGRESS NOTE ADULT - SUBJECTIVE AND OBJECTIVE BOX
INTERVAL HPI/OVERNIGHT EVENTS:  Pt resting comfortably, however c/o L neck pain.   States Dilaudid only helps pain improve slightly  Tolerating diet.  Denies numbness/tingling of LUE.    MEDICATIONS  (STANDING):  atorvastatin 40 milliGRAM(s) Oral at bedtime  dextrose 50% Injectable 25 Gram(s) IV Push once  folic acid 1 milliGRAM(s) Oral daily  gabapentin 300 milliGRAM(s) Oral at bedtime  glucagon  Injectable 1 milliGRAM(s) IntraMuscular once  heparin   Injectable 5000 Unit(s) SubCutaneous every 8 hours  hydrocortisone 1% Cream 1 Application(s) Topical two times a day  insulin lispro (ADMELOG) corrective regimen sliding scale   SubCutaneous three times a day before meals  metoprolol tartrate 50 milliGRAM(s) Oral two times a day  mirtazapine 15 milliGRAM(s) Oral at bedtime  NIFEdipine XL 30 milliGRAM(s) Oral daily  QUEtiapine 50 milliGRAM(s) Oral at bedtime  risperiDONE   Tablet 2 milliGRAM(s) Oral daily  senna 2 Tablet(s) Oral at bedtime  sevelamer carbonate 800 milliGRAM(s) Oral three times a day with meals    MEDICATIONS  (PRN):  acetaminophen   Tablet .. 650 milliGRAM(s) Oral every 6 hours PRN Temp greater or equal to 38C (100.4F), Mild Pain (1 - 3)  ALBUTerol    90 MICROgram(s) HFA Inhaler 2 Puff(s) Inhalation every 6 hours PRN Shortness of Breath and/or Wheezing  HYDROmorphone  Injectable 1 milliGRAM(s) IV Push every 4 hours PRN Severe Pain (7 - 10)  ondansetron Injectable 4 milliGRAM(s) IV Push every 8 hours PRN Nausea and/or Vomiting  oxycodone    5 mG/acetaminophen 325 mG 1 Tablet(s) Oral every 4 hours PRN Moderate Pain (4 - 6)      Vital Signs Last 24 Hrs  T(C): 36.7 (14 Feb 2021 04:49), Max: 36.9 (13 Feb 2021 20:54)  T(F): 98.1 (14 Feb 2021 04:49), Max: 98.4 (13 Feb 2021 20:54)  HR: 69 (14 Feb 2021 04:49) (62 - 69)  BP: 150/82 (14 Feb 2021 04:49) (150/82 - 164/76)  BP(mean): --  RR: 18 (14 Feb 2021 04:49) (16 - 18)  SpO2: 97% (14 Feb 2021 04:49) (97% - 100%)    Physical:  Gen: A&O x3  LUE: Warm, distal sensation intact. Radial pulse 2+. Anterior upper arm dressing c/d/i. MAURIZIO with 5cc ss  Chest: PC in place.    I&O's Detail    13 Feb 2021 07:01  -  14 Feb 2021 07:00  --------------------------------------------------------  IN:  Total IN: 0 mL    OUT:    Bulb (mL): 12.5 mL  Total OUT: 12.5 mL    Total NET: -12.5 mL      LABS:                        10.4   8.26  )-----------( 172      ( 14 Feb 2021 07:29 )             32.4             02-14    127<L>  |  88<L>  |  72<H>  ----------------------------<  96  5.7<H>   |  24  |  10.90<H>    Ca    9.8      14 Feb 2021 07:29  Phos  10.1     02-14  Mg     2.4     02-14        40y.o. Male

## 2021-02-14 NOTE — PROGRESS NOTE ADULT - SUBJECTIVE AND OBJECTIVE BOX
Patient is a 40y Male with  ESRD ON HD  SLEEPING  AT T HE TIME OF  EXAM  EASILY  WOKEN  UP   C/O PAIN  AT THE SURGICAL  SITE!!    aspirin (Swelling)  fish (Unknown)  penicillin (Rash)  penicillins (Swelling)  shellfish (Rash)  shellfish (Unknown)    Hospital Medications:   MEDICATIONS  (STANDING):  atorvastatin 40 milliGRAM(s) Oral at bedtime  dextrose 50% Injectable 25 Gram(s) IV Push once  folic acid 1 milliGRAM(s) Oral daily  gabapentin 300 milliGRAM(s) Oral at bedtime  glucagon  Injectable 1 milliGRAM(s) IntraMuscular once  heparin   Injectable 5000 Unit(s) SubCutaneous every 8 hours  hydrocortisone 1% Cream 1 Application(s) Topical two times a day  insulin lispro (ADMELOG) corrective regimen sliding scale   SubCutaneous three times a day before meals  metoprolol tartrate 50 milliGRAM(s) Oral two times a day  mirtazapine 15 milliGRAM(s) Oral at bedtime  NIFEdipine XL 30 milliGRAM(s) Oral daily  QUEtiapine 50 milliGRAM(s) Oral at bedtime  risperiDONE   Tablet 2 milliGRAM(s) Oral daily  senna 2 Tablet(s) Oral at bedtime  sevelamer carbonate 1600 milliGRAM(s) Oral three times a day with meals    REVIEW OF SYSTEMS:  HAS NO   FEVER  CHILLS   NO   SOB  OR  COUGH   NO CH  PAIN  / PALPITATIONS   APPETITE IS OK, NO  N/V  OR  ABD  PAIN  VOIDING  WELL   NO LEG  SWELLING      VITALS:  T(F): 98.1 (21 @ 04:49), Max: 98.4 (21 @ 20:54)  HR: 69 (21 @ 04:49)  BP: 150/82 (21 @ 04:49)  RR: 18 (21 @ 04:49)  SpO2: 97% (21 @ 04:49)  Wt(kg): --     @ 07:01  -   @ 07:00  --------------------------------------------------------  IN: 0 mL / OUT: 12.5 mL / NET: -12.5 mL        PHYSICAL EXAM:  Constitutional: NAD  HEENT: CONJ PINK  Neck: No JVD,  R IJ PC IN  PLACE  Respiratory: CTAB, no wheezes, rales or rhonchi  Cardiovascular: S1, S2, RRR  Gastrointestinal: BS+, soft, NT/ND  Extremities: . No peripheral edema  Neurological: A/O x 3,  :  No hernandez.     Vascular Access: R IJ  PC AND AVF L  UPPER  ARM  S/P  ANEURYSM  REPAIR  HAS DRESSING IN  PLACE    LABS:      127<L>  |  88<L>  |  72<H>  ----------------------------<  96  5.7<H>   |  24  |  10.90<H>    Ca    9.8      2021 07:29  Phos  10.1       Mg     2.4           Creatinine Trend: 10.90 <--, 8.80 <--, 7.21 <--, 10.40 <--, 8.39 <--, 9.24 <--, 7.03 <--, 6.07 <--                        10.4   8.26  )-----------( 172      ( 2021 07:29 )             32.4     Urine Studies:  Urinalysis Basic - ( 2021 19:15 )    Color: Yellow / Appearance: Clear / S.010 / pH:   Gluc:  / Ketone: Negative  / Bili: Negative / Urobili: Negative   Blood:  / Protein: 500 mg/dL / Nitrite: Negative   Leuk Esterase: Negative / RBC: 2-5 /HPF / WBC 0-2 /HPF   Sq Epi:  / Non Sq Epi: Occasional /HPF / Bacteria: Trace /HPF        RADIOLOGY & ADDITIONAL STUDIES:

## 2021-02-14 NOTE — PROGRESS NOTE ADULT - ASSESSMENT
40y.o. Male s/p resection of LUE aneurysm, L cephalic to L IJ graft bypass, PC placement    -Add IV tylenol for breakthrough pain  -LUE elevation  -Dressing change PRN  -MAURIZIO d/c'd at bedside  -DVT ppx

## 2021-02-14 NOTE — PROGRESS NOTE ADULT - PROBLEM SELECTOR PLAN 2
- Dr La is the nephrologist   - He will follow in house for continuation of dialysis  - Monitor electrolytes and BMP  - Adjust medications  - Plan for dialysis on 2/15  - hyponatremia, hyperkalemia, hyperphosphatemia  - Increased Sevelamer and gave Kayexalate

## 2021-02-14 NOTE — PROGRESS NOTE ADULT - ASSESSMENT
A/P  ESRD ON  HD  FOR HD  TOMORROW   2 K  BATH ,  1  K  LAST HOUR-  FREQ  HIGH  K  UF GOAL3-3.5  LIT  OFF EPO    S/P  EXCISION OF  ANEURYSM L  UPPER ARM  AVF    MAURIZIO  DRAIN  OUT  BEING  DIALYZED VIA R  IJ  PC    D/C  PLANNING

## 2021-02-15 ENCOUNTER — TRANSCRIPTION ENCOUNTER (OUTPATIENT)
Age: 41
End: 2021-02-15

## 2021-02-15 VITALS — DIASTOLIC BLOOD PRESSURE: 74 MMHG | SYSTOLIC BLOOD PRESSURE: 147 MMHG | HEART RATE: 58 BPM

## 2021-02-15 LAB
ANION GAP SERPL CALC-SCNC: 17 MMOL/L — SIGNIFICANT CHANGE UP (ref 5–17)
ANION GAP SERPL CALC-SCNC: 7 MMOL/L — SIGNIFICANT CHANGE UP (ref 5–17)
BUN SERPL-MCNC: 53 MG/DL — HIGH (ref 7–18)
BUN SERPL-MCNC: 88 MG/DL — HIGH (ref 7–18)
CALCIUM SERPL-MCNC: 8.9 MG/DL — SIGNIFICANT CHANGE UP (ref 8.4–10.5)
CALCIUM SERPL-MCNC: 9.7 MG/DL — SIGNIFICANT CHANGE UP (ref 8.4–10.5)
CHLORIDE SERPL-SCNC: 84 MMOL/L — LOW (ref 96–108)
CHLORIDE SERPL-SCNC: 92 MMOL/L — LOW (ref 96–108)
CO2 SERPL-SCNC: 23 MMOL/L — SIGNIFICANT CHANGE UP (ref 22–31)
CO2 SERPL-SCNC: 30 MMOL/L — SIGNIFICANT CHANGE UP (ref 22–31)
CREAT SERPL-MCNC: 12.4 MG/DL — HIGH (ref 0.5–1.3)
CREAT SERPL-MCNC: 8.37 MG/DL — HIGH (ref 0.5–1.3)
GLUCOSE BLDC GLUCOMTR-MCNC: 107 MG/DL — HIGH (ref 70–99)
GLUCOSE BLDC GLUCOMTR-MCNC: 118 MG/DL — HIGH (ref 70–99)
GLUCOSE BLDC GLUCOMTR-MCNC: 118 MG/DL — HIGH (ref 70–99)
GLUCOSE SERPL-MCNC: 114 MG/DL — HIGH (ref 70–99)
GLUCOSE SERPL-MCNC: 93 MG/DL — SIGNIFICANT CHANGE UP (ref 70–99)
HCT VFR BLD CALC: 30.9 % — LOW (ref 39–50)
HGB BLD-MCNC: 10 G/DL — LOW (ref 13–17)
MAGNESIUM SERPL-MCNC: 2.5 MG/DL — SIGNIFICANT CHANGE UP (ref 1.6–2.6)
MCHC RBC-ENTMCNC: 29.2 PG — SIGNIFICANT CHANGE UP (ref 27–34)
MCHC RBC-ENTMCNC: 32.4 GM/DL — SIGNIFICANT CHANGE UP (ref 32–36)
MCV RBC AUTO: 90.4 FL — SIGNIFICANT CHANGE UP (ref 80–100)
NRBC # BLD: 0 /100 WBCS — SIGNIFICANT CHANGE UP (ref 0–0)
PHOSPHATE SERPL-MCNC: 11.4 MG/DL — HIGH (ref 2.5–4.5)
PLATELET # BLD AUTO: 182 K/UL — SIGNIFICANT CHANGE UP (ref 150–400)
POTASSIUM SERPL-MCNC: 4.1 MMOL/L — SIGNIFICANT CHANGE UP (ref 3.5–5.3)
POTASSIUM SERPL-MCNC: 6.3 MMOL/L — CRITICAL HIGH (ref 3.5–5.3)
POTASSIUM SERPL-SCNC: 4.1 MMOL/L — SIGNIFICANT CHANGE UP (ref 3.5–5.3)
POTASSIUM SERPL-SCNC: 6.3 MMOL/L — CRITICAL HIGH (ref 3.5–5.3)
RBC # BLD: 3.42 M/UL — LOW (ref 4.2–5.8)
RBC # FLD: 13.9 % — SIGNIFICANT CHANGE UP (ref 10.3–14.5)
SODIUM SERPL-SCNC: 124 MMOL/L — LOW (ref 135–145)
SODIUM SERPL-SCNC: 129 MMOL/L — LOW (ref 135–145)
WBC # BLD: 7.97 K/UL — SIGNIFICANT CHANGE UP (ref 3.8–10.5)
WBC # FLD AUTO: 7.97 K/UL — SIGNIFICANT CHANGE UP (ref 3.8–10.5)

## 2021-02-15 PROCEDURE — 87340 HEPATITIS B SURFACE AG IA: CPT

## 2021-02-15 PROCEDURE — 84443 ASSAY THYROID STIM HORMONE: CPT

## 2021-02-15 PROCEDURE — 82607 VITAMIN B-12: CPT

## 2021-02-15 PROCEDURE — 93005 ELECTROCARDIOGRAM TRACING: CPT

## 2021-02-15 PROCEDURE — 99239 HOSP IP/OBS DSCHRG MGMT >30: CPT

## 2021-02-15 PROCEDURE — 80048 BASIC METABOLIC PNL TOTAL CA: CPT

## 2021-02-15 PROCEDURE — 85730 THROMBOPLASTIN TIME PARTIAL: CPT

## 2021-02-15 PROCEDURE — 99261: CPT

## 2021-02-15 PROCEDURE — 85652 RBC SED RATE AUTOMATED: CPT

## 2021-02-15 PROCEDURE — 76870 US EXAM SCROTUM: CPT

## 2021-02-15 PROCEDURE — 87086 URINE CULTURE/COLONY COUNT: CPT

## 2021-02-15 PROCEDURE — C1768: CPT

## 2021-02-15 PROCEDURE — 85610 PROTHROMBIN TIME: CPT

## 2021-02-15 PROCEDURE — 71045 X-RAY EXAM CHEST 1 VIEW: CPT

## 2021-02-15 PROCEDURE — 86850 RBC ANTIBODY SCREEN: CPT

## 2021-02-15 PROCEDURE — 36415 COLL VENOUS BLD VENIPUNCTURE: CPT

## 2021-02-15 PROCEDURE — 86900 BLOOD TYPING SEROLOGIC ABO: CPT

## 2021-02-15 PROCEDURE — 80061 LIPID PANEL: CPT

## 2021-02-15 PROCEDURE — U0005: CPT

## 2021-02-15 PROCEDURE — 85027 COMPLETE CBC AUTOMATED: CPT

## 2021-02-15 PROCEDURE — C1889: CPT

## 2021-02-15 PROCEDURE — 96372 THER/PROPH/DIAG INJ SC/IM: CPT

## 2021-02-15 PROCEDURE — 85025 COMPLETE CBC W/AUTO DIFF WBC: CPT

## 2021-02-15 PROCEDURE — C1894: CPT

## 2021-02-15 PROCEDURE — 81001 URINALYSIS AUTO W/SCOPE: CPT

## 2021-02-15 PROCEDURE — 80053 COMPREHEN METABOLIC PANEL: CPT

## 2021-02-15 PROCEDURE — 86901 BLOOD TYPING SEROLOGIC RH(D): CPT

## 2021-02-15 PROCEDURE — 82306 VITAMIN D 25 HYDROXY: CPT

## 2021-02-15 PROCEDURE — 82746 ASSAY OF FOLIC ACID SERUM: CPT

## 2021-02-15 PROCEDURE — 87635 SARS-COV-2 COVID-19 AMP PRB: CPT

## 2021-02-15 PROCEDURE — 83735 ASSAY OF MAGNESIUM: CPT

## 2021-02-15 PROCEDURE — 84100 ASSAY OF PHOSPHORUS: CPT

## 2021-02-15 PROCEDURE — 83036 HEMOGLOBIN GLYCOSYLATED A1C: CPT

## 2021-02-15 PROCEDURE — 88304 TISSUE EXAM BY PATHOLOGIST: CPT

## 2021-02-15 PROCEDURE — C1750: CPT

## 2021-02-15 PROCEDURE — 86706 HEP B SURFACE ANTIBODY: CPT

## 2021-02-15 PROCEDURE — 82962 GLUCOSE BLOOD TEST: CPT

## 2021-02-15 PROCEDURE — 99285 EMERGENCY DEPT VISIT HI MDM: CPT | Mod: 25

## 2021-02-15 PROCEDURE — 76000 FLUOROSCOPY <1 HR PHYS/QHP: CPT

## 2021-02-15 PROCEDURE — C1757: CPT

## 2021-02-15 RX ORDER — FOLIC ACID 0.8 MG
1 TABLET ORAL
Qty: 30 | Refills: 0
Start: 2021-02-15 | End: 2021-03-16

## 2021-02-15 RX ORDER — SEVELAMER CARBONATE 2400 MG/1
2 POWDER, FOR SUSPENSION ORAL
Qty: 90 | Refills: 0
Start: 2021-02-15 | End: 2021-03-01

## 2021-02-15 RX ORDER — SENNA PLUS 8.6 MG/1
2 TABLET ORAL
Qty: 40 | Refills: 0
Start: 2021-02-15 | End: 2021-03-06

## 2021-02-15 RX ORDER — OXYCODONE AND ACETAMINOPHEN 5; 325 MG/1; MG/1
1 TABLET ORAL
Qty: 10 | Refills: 0
Start: 2021-02-15 | End: 2021-02-17

## 2021-02-15 RX ORDER — OXYCODONE AND ACETAMINOPHEN 5; 325 MG/1; MG/1
1 TABLET ORAL EVERY 4 HOURS
Refills: 0 | Status: DISCONTINUED | OUTPATIENT
Start: 2021-02-15 | End: 2021-02-15

## 2021-02-15 RX ORDER — HYDROCORTISONE 1 %
1 OINTMENT (GRAM) TOPICAL
Qty: 1 | Refills: 0
Start: 2021-02-15 | End: 2021-02-28

## 2021-02-15 RX ADMIN — Medication 50 MILLIGRAM(S): at 17:22

## 2021-02-15 RX ADMIN — HEPARIN SODIUM 5000 UNIT(S): 5000 INJECTION INTRAVENOUS; SUBCUTANEOUS at 06:20

## 2021-02-15 RX ADMIN — HYDROMORPHONE HYDROCHLORIDE 1 MILLIGRAM(S): 2 INJECTION INTRAMUSCULAR; INTRAVENOUS; SUBCUTANEOUS at 10:25

## 2021-02-15 RX ADMIN — Medication 1 MILLIGRAM(S): at 10:25

## 2021-02-15 RX ADMIN — Medication 1 APPLICATION(S): at 06:20

## 2021-02-15 RX ADMIN — SEVELAMER CARBONATE 1600 MILLIGRAM(S): 2400 POWDER, FOR SUSPENSION ORAL at 09:29

## 2021-02-15 RX ADMIN — Medication 50 MILLIGRAM(S): at 06:21

## 2021-02-15 RX ADMIN — Medication 30 MILLIGRAM(S): at 06:21

## 2021-02-15 RX ADMIN — OXYCODONE AND ACETAMINOPHEN 1 TABLET(S): 5; 325 TABLET ORAL at 16:07

## 2021-02-15 RX ADMIN — HYDROMORPHONE HYDROCHLORIDE 1 MILLIGRAM(S): 2 INJECTION INTRAMUSCULAR; INTRAVENOUS; SUBCUTANEOUS at 06:23

## 2021-02-15 RX ADMIN — SEVELAMER CARBONATE 1600 MILLIGRAM(S): 2400 POWDER, FOR SUSPENSION ORAL at 17:22

## 2021-02-15 RX ADMIN — HYDROMORPHONE HYDROCHLORIDE 1 MILLIGRAM(S): 2 INJECTION INTRAMUSCULAR; INTRAVENOUS; SUBCUTANEOUS at 02:56

## 2021-02-15 RX ADMIN — SEVELAMER CARBONATE 1600 MILLIGRAM(S): 2400 POWDER, FOR SUSPENSION ORAL at 11:53

## 2021-02-15 RX ADMIN — RISPERIDONE 2 MILLIGRAM(S): 4 TABLET ORAL at 10:25

## 2021-02-15 RX ADMIN — Medication 1 APPLICATION(S): at 17:22

## 2021-02-15 NOTE — PROGRESS NOTE ADULT - ASSESSMENT
# ESRD. seen on HD via permacath. stable  # anemia of CKD. HGB stable  # lytes/acid base at goal  # renal osteodystrophy- cont sevelamer.    D/C planning

## 2021-02-15 NOTE — PROGRESS NOTE ADULT - PROBLEM SELECTOR PLAN 2
- Dr La is the nephrologist   - He will follow in house for continuation of dialysis  - Monitor electrolytes and BMP  - Adjust medications  - Plan for dialysis on 2/15  - hyponatremia, hyperkalemia, hyperphosphatemia  - Increased Sevelamer and gave Kayexalate - Dr La is the nephrologist   - He will follow in house for continuation of dialysis  - Monitor electrolytes and BMP  - Adjust medications  - Plan for dialysis on 2/15  - hyponatremia, hyperkalemia, hyperphosphatemia  - continue Sevelamer

## 2021-02-15 NOTE — PROGRESS NOTE ADULT - ASSESSMENT
40y.o. Male s/p resection of LUE aneurysm, L cephalic to L IJ graft bypass, PC placement POD#4    -LUE elevation  -Dressing change prn  -Pain control prn  -Outpt f/u with Dr. Foote within 1 week of discharge  -HD as scheduled

## 2021-02-15 NOTE — PROGRESS NOTE ADULT - ATTENDING COMMENTS
Kyler Villalobos MD  [facility]  Office [phone]  Ans Serv [phone] cell [phone]
Patient seen and examined this morning with medical team. Patient is a 41 y/o male with ESRD admitted for Left AVF revision due to aneurysm, went to the OR for AVF revision and Permacath placement on 2/11 with vascular surgery Dr. Foote, had a MAURIZIO drain, vascular following, removed MAURIZIO drain today. Pain slowly improving continue current pain regimen with prn IV dilaudid x1 more day. Continue with his chronic home O2 at 3L. Testicular US showed microlithiasis, need outpatient urology follow up and repeat Testicular US in 6-12 months. Continue dialysis on M/W/F schedule, continues to have significant electrolyte derangements, will give another dose of Kayexalate today, is scheduled for HD tomorrow, increase Sevelamer to 1600 TID. Continue hydrocortisone cream for pruritis x 2 weeks, is improving, will need outpatient follow up with Dermatology. Continue monitoring postoperatively, can be discharged home once cleared by vascular. He will need reinstatement of his dialysis prior to discharge.    Rest as per resident's note.
Patient seen and examined this morning with medical team. Patient is a 39 y/o male with ESRD admitted for Left AVF revision due to aneurysm, went to the OR for AVF revision and Permacath placement on 2/11 with vascular surgery Dr. Foote, had a MAURIZIO drain, vascular following, removed MAURIZIO drain, pain slowly improving switch to PO percocet and DC IV dilaudid, vascular cleared for discharge and outpatient follow up in 1 week w/ Dr. Foote. Continue with his chronic home O2 at 3L. Testicular US showed microlithiasis, need outpatient urology follow up and repeat Testicular US in 6-12 months. Continue dialysis on M/W/F schedule, continues to have significant electrolyte derangements, will repeat BP after HD today, if K improved and <5.5 can discharge home today. Continue with increased Sevelamer at 1600 TID. Continue hydrocortisone cream for pruritis x 2 weeks, continues to improve, will need outpatient follow up with Dermatology. Case manger notified about reinstatement of his dialysis, next session on wednesday. Can discharge home with short course of Percocet.    Rest as per resident's note.
Patient seen and examined this morning, plan of care discussed w/ medical team. Patient is a 41 y/o male with ESRD admitted for Left AVF revision due to suspected aneurysm, planned to go to the OR for AVF revision and Permacath this afternoon with vascular surgery Dr. Foote. Vascular and Nephrology requested medical, cardiology, and pulmonary pre-op evaluations, which were obtain. Patient has an RCRI score: 2 , Class III risk, he is at intermediate risk for post operative cardiac complications. Cardiology Dr. Reyes and Pulmonary Dr. Nielson consulted for pre-op evaluation, his chronic conditions appear to be optimized. Continue with his chronic home O2 at 3L. Testicular US showed microlithiasis, need outpatient urology follow up and repeat Testicular US in 6-12 months. Continue dialysis on M/W/F schedule. Continue hydrocortisone cream for pruritis which is improving. Will monitor postoperatively, dialyze tomorrow morning and likely discharge home after dialysis.    Rest as per resident's note.
Patient seen and examined this afternoon after dialysis, plan of care discussed w/ medical team. Patient is a 41 y/o male with ESRD admitted for Left AVF revision due to aneurysm, went to the OR for AVF revision and Permacath placement on 2/11 with vascular surgery Dr. Foote, now has a MAURIZIO drain in place, vascular planning on changing dressing again tomorrow. Increase pain regimen with prn IV dilaudid. Continue with his chronic home O2 at 3L. Testicular US showed microlithiasis, need outpatient urology follow up and repeat Testicular US in 6-12 months. Continue dialysis on M/W/F schedule. Continue hydrocortisone cream for pruritis x 2 weeks, is improving, will need outpatient follow up with Dermatology. Continue monitoring postoperatively, can be discharged home once cleared by vascular. He will need reinstatement of his dialysis prior to discharge.     Rest as per resident's note.
Patient seen and examined this morning, plan of care discussed w/ medical team. Patient is a 41 y/o male with ESRD admitted for Left AVF revision due to suspected aneurysm, planned to go to the OR for AVF revision and Permacath placement on Thursday with vascular surgery Dr. Foote. Vascular and Nephrology requested medical, cardiology, and pulmonary pre-op evaluations. Patient has an RCRI score: 2 , Class III risk, he is at intermediate risk for post operative cardiac complications. Cardiology Dr. Reyes and Pulmonary Dr. Nielson consulted for pre-op evaluation, however his chronic conditions appear to be optimized. Continue with his chronic home O2 at 3L. Continue bactrim PO for skin and soft tissue swelling on scrotum, f/up testicular US. Continue dialysis on M/W/F schedule. Started on hydrocortisone cream for pruritis.     Rest as per resident's note.
Patient seen and examined this morning, plan of care discussed w/ medical team. Patient is a 41 y/o male with ESRD admitted for Left AVF revision due to suspected aneurysm, planned to go to the OR for AVF revision and Permacath placement tomorrow 2/11 (Thursday) with vascular surgery Dr. Foote. Vascular and Nephrology requested medical, cardiology, and pulmonary pre-op evaluations. Patient has an RCRI score: 2 , Class III risk, he is at intermediate risk for post operative cardiac complications. Cardiology Dr. Reyes and Pulmonary Dr. Nielson consulted for pre-op evaluation, however his chronic conditions appear to be optimized. Continue with his chronic home O2 at 3L. DC bactrim as his scrotum does not appear infected, testicular US showed microlithiasis, need outpatient urology follow up and repeat Testicular US in 6-12 months. Continue dialysis on M/W/F schedule. Started on hydrocortisone cream for pruritis.     Rest as per resident's note.

## 2021-02-15 NOTE — PROGRESS NOTE ADULT - SUBJECTIVE AND OBJECTIVE BOX
PGY-1 Progress Note discussed with attending    PAGER #: [[phone]] TILL 5:00 PM  PLEASE CONTACT ON CALL TEAM:  - On Call Team (Please refer to Janine) FROM 5:00 PM - 8:30PM  - Nightfloat Team FROM 8:30 -7:30 AM    CHIEF COMPLAINT & BRIEF HOSPITAL COURSE:      INTERVAL HPI/OVERNIGHT EVENTS:       REVIEW OF SYSTEMS:  CONSTITUTIONAL: No fever, weight loss, or fatigue  RESPIRATORY: No cough, wheezing, chills or hemoptysis; No shortness of breath  CARDIOVASCULAR: No chest pain, palpitations, dizziness, or leg swelling  GASTROINTESTINAL: No abdominal pain. No nausea, vomiting, or hematemesis; No diarrhea or constipation. No melena or hematochezia.  GENITOURINARY: No dysuria or hematuria, urinary frequency  MUSCULOSKELETAL: No pain, no Limited ROM  NEUROLOGICAL: No headaches, memory loss, loss of strength, numbness, or tremors  SKIN: No itching, burning, rashes, or lesions     MEDICATIONS  (STANDING):  atorvastatin 40 milliGRAM(s) Oral at bedtime  dextrose 50% Injectable 25 Gram(s) IV Push once  folic acid 1 milliGRAM(s) Oral daily  gabapentin 300 milliGRAM(s) Oral at bedtime  glucagon  Injectable 1 milliGRAM(s) IntraMuscular once  heparin   Injectable 5000 Unit(s) SubCutaneous every 8 hours  hydrocortisone 1% Cream 1 Application(s) Topical two times a day  insulin lispro (ADMELOG) corrective regimen sliding scale   SubCutaneous three times a day before meals  metoprolol tartrate 50 milliGRAM(s) Oral two times a day  mirtazapine 15 milliGRAM(s) Oral at bedtime  NIFEdipine XL 30 milliGRAM(s) Oral daily  QUEtiapine 50 milliGRAM(s) Oral at bedtime  risperiDONE   Tablet 2 milliGRAM(s) Oral daily  senna 2 Tablet(s) Oral at bedtime  sevelamer carbonate 1600 milliGRAM(s) Oral three times a day with meals    MEDICATIONS  (PRN):  acetaminophen   Tablet .. 650 milliGRAM(s) Oral every 6 hours PRN Temp greater or equal to 38C (100.4F), Mild Pain (1 - 3)  ALBUTerol    90 MICROgram(s) HFA Inhaler 2 Puff(s) Inhalation every 6 hours PRN Shortness of Breath and/or Wheezing  HYDROmorphone  Injectable 1 milliGRAM(s) IV Push every 4 hours PRN Severe Pain (7 - 10)  ondansetron Injectable 4 milliGRAM(s) IV Push every 8 hours PRN Nausea and/or Vomiting  oxycodone    5 mG/acetaminophen 325 mG 1 Tablet(s) Oral every 4 hours PRN Moderate Pain (4 - 6)      Vital Signs Last 24 Hrs  T(C): 36.7 (15 Feb 2021 05:09), Max: 37.1 (14 Feb 2021 21:20)  T(F): 98 (15 Feb 2021 05:09), Max: 98.7 (14 Feb 2021 21:20)  HR: 70 (15 Feb 2021 05:09) (65 - 70)  BP: 132/75 (15 Feb 2021 05:09) (132/75 - 167/86)  BP(mean): --  RR: 18 (15 Feb 2021 05:09) (16 - 19)  SpO2: 95% (15 Feb 2021 05:09) (95% - 97%)    PHYSICAL EXAMINATION:  GENERAL: NAD, well built  HEAD:  Atraumatic, Normocephalic  EYES:  conjunctiva and sclera clear, no scleral icterus  NECK: Supple, No JVD, Normal thyroid  CHEST/LUNG: Clear to auscultation.  No rales, rhonchi, wheezing, or rubs  HEART: Regular rate and rhythm; No murmurs, rubs, or gallops  ABDOMEN: Soft, Nontender, Nondistended; Bowel sounds present  NERVOUS SYSTEM:  Alert & Oriented X3,  Strength 5/5 in upper and lower extremities, sensation intact  EXTREMITIES:  2+ Peripheral Pulses, No clubbing, cyanosis, or edema  SKIN: warm dry, no lesions noted                          10.4   8.26  )-----------( 172      ( 14 Feb 2021 07:29 )             32.4     02-14    127<L>  |  88<L>  |  72<H>  ----------------------------<  96  5.7<H>   |  24  |  10.90<H>    Ca    9.8      14 Feb 2021 07:29  Phos  10.1     02-14  Mg     2.4     02-14              I&O's Summary        RADIOLOGY & ADDITIONAL TESTS:                   PGY-1 Progress Note discussed with attending    PAGER #: [[phone]] TILL 5:00 PM  PLEASE CONTACT ON CALL TEAM:  - On Call Team (Please refer to Janine) FROM 5:00 PM - 8:30PM  - Nightfloat Team FROM 8:30 -7:30 AM    CHIEF COMPLAINT & BRIEF HOSPITAL COURSE:  40 year old man with PMH of DM, ESRD on dialysis, Hypertension, COPD on home oxygen, Bipolar diease and schizophrenia, followed by Dr La closely sent in for AV fistula evaluation post HD 2/8/21 for suspected aneurysm. Patient has been complaining of pain at the AV fistula site for quite sometime and he is sent to hospital for fistula repair on 2/11 and requires medical optimization. He also reports a small scrotal bump/swelling for few days which is painful but not erythematous. He had an abscess drained in the past in same location. 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 Patient advised to follow up with urology outpatient. Patient had Surgery done 2/11, he had a left upper extremity aneurysmal av fistula and is s/p resection of aneurysm and stent with primary repair of cephalic vein in addition to cephalic to internal jugular vein bypass with ptfe and permcath insertion at the right IJ with fluoro guidance. Cleared for discharge from Vascular with instructions to follow up outpatient within 1 week of discharge.     INTERVAL HPI/OVERNIGHT EVENTS:   Patient seen and examined at bedside, complaining of pain in arm, mobility improved somewhat. I explained the need to stop using the dilaudid and try something else, like percocet, as he will not have dilaudid at home. Patient understands and is agreeable. He will go to dialysis and planing for discharge after. He is ambulating, eating, having regular bms, dressing on left arm is clean. Dressing on the Right perma cath needs to be cleaned and changed.     REVIEW OF SYSTEMS:  CONSTITUTIONAL: No fever, weight loss, or fatigue  RESPIRATORY: No cough, wheezing, chills or hemoptysis; No shortness of breath  CARDIOVASCULAR: No chest pain, palpitations, dizziness, or leg swelling  GASTROINTESTINAL: No abdominal pain. No nausea, vomiting,  no hematemesis; No diarrhea or constipation. No melena or hematochezia.  GENITOURINARY: No dysuria or hematuria, urinary frequency  MUSCULOSKELETAL: Pain in L arm. at area of surgical intervention, improved, but still limited ROM due to pain.  NEUROLOGICAL: No headaches, memory loss, loss of strength, numbness, or tremors  SKIN: improving rash located on RLE and LUE, pattern consistent with red tattoo dye. Tender, non erythematous nodule on L upper thigh    MEDICATIONS  (STANDING):  atorvastatin 40 milliGRAM(s) Oral at bedtime  dextrose 50% Injectable 25 Gram(s) IV Push once  folic acid 1 milliGRAM(s) Oral daily  gabapentin 300 milliGRAM(s) Oral at bedtime  glucagon  Injectable 1 milliGRAM(s) IntraMuscular once  heparin   Injectable 5000 Unit(s) SubCutaneous every 8 hours  hydrocortisone 1% Cream 1 Application(s) Topical two times a day  insulin lispro (ADMELOG) corrective regimen sliding scale   SubCutaneous three times a day before meals  metoprolol tartrate 50 milliGRAM(s) Oral two times a day  mirtazapine 15 milliGRAM(s) Oral at bedtime  NIFEdipine XL 30 milliGRAM(s) Oral daily  QUEtiapine 50 milliGRAM(s) Oral at bedtime  risperiDONE   Tablet 2 milliGRAM(s) Oral daily  senna 2 Tablet(s) Oral at bedtime  sevelamer carbonate 1600 milliGRAM(s) Oral three times a day with meals    MEDICATIONS  (PRN):  acetaminophen   Tablet .. 650 milliGRAM(s) Oral every 6 hours PRN Temp greater or equal to 38C (100.4F), Mild Pain (1 - 3)  ALBUTerol    90 MICROgram(s) HFA Inhaler 2 Puff(s) Inhalation every 6 hours PRN Shortness of Breath and/or Wheezing  HYDROmorphone  Injectable 1 milliGRAM(s) IV Push every 4 hours PRN Severe Pain (7 - 10)  ondansetron Injectable 4 milliGRAM(s) IV Push every 8 hours PRN Nausea and/or Vomiting  oxycodone    5 mG/acetaminophen 325 mG 1 Tablet(s) Oral every 4 hours PRN Moderate Pain (4 - 6)      Vital Signs Last 24 Hrs  T(C): 36.7 (15 Feb 2021 05:09), Max: 37.1 (14 Feb 2021 21:20)  T(F): 98 (15 Feb 2021 05:09), Max: 98.7 (14 Feb 2021 21:20)  HR: 70 (15 Feb 2021 05:09) (65 - 70)  BP: 132/75 (15 Feb 2021 05:09) (132/75 - 167/86)  BP(mean): --  RR: 18 (15 Feb 2021 05:09) (16 - 19)  SpO2: 95% (15 Feb 2021 05:09) (95% - 97%)    PHYSICAL EXAMINATION:  GENERAL: obese male with NC in place, NAD  HEAD:  Atraumatic, Normocephalic  EYES:  conjunctiva and sclera clear, no scleral icterus  NECK: Supple, No JVD, Normal thyroid  CHEST/LUNG: Clear to auscultation.  No rales, rhonchi, wheezing, or rubs  HEART: Regular rate and rhythm; mild systolic murmur appreciated  ABDOMEN: Soft, Nontender, Nondistended; Bowel sounds present  NERVOUS SYSTEM:  Alert & Oriented X3, sensation intact, strength limited due to pain on Left upper extremity  EXTREMITIES: Av fistula site with dressing clean and intact, tenderness to palpation, generalized swelling. LIJ bypass site with dressing in place. R sided permacath with dressing in place, dried blood around site. 2+ Peripheral Pulses, No clubbing, cyanosis, or edema  SKIN: improving rash on LUE and Lower extremities bilaterally.  MSK: improved ROM in ULE, limited due to pain. left hand ROM normal.                             10.4   8.26  )-----------( 172      ( 14 Feb 2021 07:29 )             32.4     02-14    127<L>  |  88<L>  |  72<H>  ----------------------------<  96  5.7<H>   |  24  |  10.90<H>    Ca    9.8      14 Feb 2021 07:29  Phos  10.1     02-14  Mg     2.4     02-14              I&O's Summary        RADIOLOGY & ADDITIONAL TESTS:

## 2021-02-15 NOTE — DIETITIAN INITIAL EVALUATION ADULT. - PERTINENT LABORATORY DATA
02-15 Na124 mmol/L<L> Glu 93 mg/dL K+ 6.3 mmol/L<HH> Cr  12.40 mg/dL<H> BUN 88 mg/dL<H>   02-15 Phos 11.4 mg/dL<H>   02-12 Alb 3.0 g/dL<L>       02-09 Chol 142 mg/dL LDL --    HDL 47 mg/dL Trig 105 mg/dL  02-09-21 @ 09:46 HgbA1C 4.6 [4.0 - 5.6]

## 2021-02-15 NOTE — DIETITIAN INITIAL EVALUATION ADULT. - FACTORS AFF FOOD INTAKE
acute on chronic comorbidities including ESRD On HD, psychological history with Anxiety, Schizophrenia, Bipolar disorder/Confucianism/ethnic/cultural/personal food preferences

## 2021-02-15 NOTE — PROGRESS NOTE ADULT - PROBLEM SELECTOR PROBLEM 2
ESRD on dialysis

## 2021-02-15 NOTE — PROGRESS NOTE ADULT - SUBJECTIVE AND OBJECTIVE BOX
[facility] : Progress Note :: [phone], (office [phone]),   Dr La / Dr Hui / Dr Barber / Dr Villalobos / Dr Hang CASTELLANO / Dr Mehta / Dr Sanchez / Dr Alber dumont  _____________________________________________________________________________________________    Seen on HD . Stable.    aspirin (Swelling)  fish (Unknown)  penicillin (Rash)  penicillins (Swelling)  shellfish (Rash)  shellfish (Unknown)    Hospital Medications:   MEDICATIONS  (STANDING):  atorvastatin 40 milliGRAM(s) Oral at bedtime  dextrose 50% Injectable 25 Gram(s) IV Push once  folic acid 1 milliGRAM(s) Oral daily  gabapentin 300 milliGRAM(s) Oral at bedtime  glucagon  Injectable 1 milliGRAM(s) IntraMuscular once  heparin   Injectable 5000 Unit(s) SubCutaneous every 8 hours  hydrocortisone 1% Cream 1 Application(s) Topical two times a day  insulin lispro (ADMELOG) corrective regimen sliding scale   SubCutaneous three times a day before meals  metoprolol tartrate 50 milliGRAM(s) Oral two times a day  mirtazapine 15 milliGRAM(s) Oral at bedtime  NIFEdipine XL 30 milliGRAM(s) Oral daily  QUEtiapine 50 milliGRAM(s) Oral at bedtime  risperiDONE   Tablet 2 milliGRAM(s) Oral daily  senna 2 Tablet(s) Oral at bedtime  sevelamer carbonate 1600 milliGRAM(s) Oral three times a day with meals      VITALS:  T(F): 97.5 (02-15-21 @ 14:28), Max: 98.7 (21 @ 21:20)  HR: 60 (02-15-21 @ 14:28)  BP: 125/67 (02-15-21 @ 14:28)  RR: 18 (02-15-21 @ 14:28)  SpO2: 98% (02-15-21 @ 14:28)  Wt(kg): --      PHYSICAL EXAM:  Constitutional: NAD  HEENT: anicteric sclera, oropharynx clear.  Neck: No JVD  Respiratory: CTAB, no wheezes, rales or rhonchi  Cardiovascular: S1, S2, RRR  Gastrointestinal: BS+, soft, NT/ND  Extremities:  No peripheral edema  Neurological: A/O x 3, no focal deficits.  : No CVA tenderness. No hernandez.   Skin: No rashes  Vascular Access: permacath in use.    LABS:  02-15    124<L>  |  84<L>  |  88<H>  ----------------------------<  93  6.3<HH>   |  23  |  12.40<H>    Ca    9.7      15 Feb 2021 07:49  Phos  11.4     02-15  Mg     2.5     02-15      Creatinine Trend: 12.40 <--, 10.90 <--, 8.80 <--, 7.21 <--, 10.40 <--, 8.39 <--, 9.24 <--, 7.03 <--, 6.07 <--                        10.0   7.97  )-----------( 182      ( 15 Feb 2021 07:49 )             30.9     Urine Studies:  Urinalysis Basic - ( 2021 19:15 )    Color: Yellow / Appearance: Clear / S.010 / pH:   Gluc:  / Ketone: Negative  / Bili: Negative / Urobili: Negative   Blood:  / Protein: 500 mg/dL / Nitrite: Negative   Leuk Esterase: Negative / RBC: 2-5 /HPF / WBC 0-2 /HPF   Sq Epi:  / Non Sq Epi: Occasional /HPF / Bacteria: Trace /HPF        RADIOLOGY & ADDITIONAL STUDIES:

## 2021-02-15 NOTE — DIETITIAN NUTRITION RISK NOTIFICATION - TREATMENT: THE FOLLOWING DIET HAS BEEN RECOMMENDED
Continue diet Rx as ordered, providing extra protein food with meals  Diet, Renal Restrictions:   For patients receiving Renal Replacement - No Protein Restr, No Conc K, No Conc Phos, Low Sodium (02-13-21 @ 17:16) [Active]

## 2021-02-15 NOTE — PROGRESS NOTE ADULT - SUBJECTIVE AND OBJECTIVE BOX
INTERVAL HPI/OVERNIGHT EVENTS:  Pt resting comfortably. No acute complaints.   Due for HD today via PC.  Denies numbness/tingling of LUE.    MEDICATIONS  (STANDING):  atorvastatin 40 milliGRAM(s) Oral at bedtime  dextrose 50% Injectable 25 Gram(s) IV Push once  folic acid 1 milliGRAM(s) Oral daily  gabapentin 300 milliGRAM(s) Oral at bedtime  glucagon  Injectable 1 milliGRAM(s) IntraMuscular once  heparin   Injectable 5000 Unit(s) SubCutaneous every 8 hours  hydrocortisone 1% Cream 1 Application(s) Topical two times a day  insulin lispro (ADMELOG) corrective regimen sliding scale   SubCutaneous three times a day before meals  metoprolol tartrate 50 milliGRAM(s) Oral two times a day  mirtazapine 15 milliGRAM(s) Oral at bedtime  NIFEdipine XL 30 milliGRAM(s) Oral daily  QUEtiapine 50 milliGRAM(s) Oral at bedtime  risperiDONE   Tablet 2 milliGRAM(s) Oral daily  senna 2 Tablet(s) Oral at bedtime  sevelamer carbonate 1600 milliGRAM(s) Oral three times a day with meals    MEDICATIONS  (PRN):  acetaminophen   Tablet .. 650 milliGRAM(s) Oral every 6 hours PRN Temp greater or equal to 38C (100.4F), Mild Pain (1 - 3)  ALBUTerol    90 MICROgram(s) HFA Inhaler 2 Puff(s) Inhalation every 6 hours PRN Shortness of Breath and/or Wheezing  HYDROmorphone  Injectable 1 milliGRAM(s) IV Push every 4 hours PRN Severe Pain (7 - 10)  ondansetron Injectable 4 milliGRAM(s) IV Push every 8 hours PRN Nausea and/or Vomiting  oxycodone    5 mG/acetaminophen 325 mG 1 Tablet(s) Oral every 4 hours PRN Moderate Pain (4 - 6)    Vital Signs Last 24 Hrs  T(C): 36.7 (15 Feb 2021 05:09), Max: 37.1 (14 Feb 2021 21:20)  T(F): 98 (15 Feb 2021 05:09), Max: 98.7 (14 Feb 2021 21:20)  HR: 70 (15 Feb 2021 05:09) (65 - 70)  BP: 132/75 (15 Feb 2021 05:09) (132/75 - 167/86)  BP(mean): --  RR: 18 (15 Feb 2021 05:09) (16 - 19)  SpO2: 95% (15 Feb 2021 05:09) (95% - 97%)    Physical:  General: A&Ox3. NAD.  LUE: Warm, NVI, radial pulse 2+. anterior upper arm Dressing C/D/I. L shoulder Dressings C/D/I. Mild thrill palpated over graft    LABS:                        10.0   7.97  )-----------( 182      ( 15 Feb 2021 07:49 )             30.9             02-15    124<L>  |  84<L>  |  88<H>  ----------------------------<  93  6.3<HH>   |  23  |  12.40<H>    Ca    9.7      15 Feb 2021 07:49  Phos  11.4     02-15  Mg     2.5     02-15

## 2021-02-15 NOTE — PROGRESS NOTE ADULT - PROVIDER SPECIALTY LIST ADULT
Internal Medicine
Internal Medicine
Nephrology
Vascular Surgery
Nephrology
Vascular Surgery
Internal Medicine

## 2021-02-15 NOTE — PROGRESS NOTE ADULT - PROBLEM SELECTOR PLAN 1
- Patient has left arm fistula, with palpable thrill  - Increased pain for sometime, no improvement  - Pain control with tylenol and percocet, dilaudid for severe pain  - Vascular follow up Dr Foote  - Surgery on 2/11, POD 1, left upper extremity aneurysmal avf s/p resection of aneuyrsm and stent with primary repair of cephalic vein in addition to cephalic to internal jugular vein bypass with ptfe   -permcath insertion at the right IJ with fluoro guidance  - LUE elevation  - dressing change prn  - MAURIZIO drain DC'ed by vascular surgery on 2/14/21 - Patient has left arm fistula, with palpable thrill  - Increased pain for sometime, no improvement  - Pain control with tylenol and percocet, dilaudid discontinued  - Vascular follow up Dr Foote within 1 week of discharge  - Surgery on 2/11, POD 1, left upper extremity aneurysmal avf s/p resection of aneuyrsm and stent with primary repair of cephalic vein in addition to cephalic to internal jugular vein bypass with ptfe   -permcath insertion at the right IJ with fluoro guidance  - LUE elevation  - dressing change prn  - MAURIZIO drain DC'ed by vascular surgery on 2/14/21

## 2021-02-15 NOTE — DISCHARGE NOTE NURSING/CASE MANAGEMENT/SOCIAL WORK - PATIENT PORTAL LINK FT
You can access the FollowMyHealth Patient Portal offered by [facility] by registering at the following website: [email or web address]. By joining [facility]’s FollowMyHealth portal, you will also be able to view your health information using other applications (apps) compatible with our system.

## 2021-02-15 NOTE — DIETITIAN INITIAL EVALUATION ADULT. - PHYSCIAL ASSESSMENT
Morbid obese/obese skin intact  Wt data in EMR reviewed, a bit fluctuated, may due to fluid shift from HD and/or scale variance

## 2021-02-15 NOTE — PROGRESS NOTE ADULT - NSHPATTENDINGPLANDISCUSS_GEN_ALL_CORE
Medical Resident, Dr. Lozano
Medical Resident, Dr Lozano
Medical Resident, Dr. Lozano
Medical Resident, Dr Lozano
Medical Resident, Dr Lozano and Nephrology Dr. La
Medical Resident, Dr Lozano

## 2021-02-17 RX ORDER — OXYCODONE AND ACETAMINOPHEN 5; 325 MG/1; MG/1
5-325 TABLET ORAL
Qty: 10 | Refills: 0 | Status: ACTIVE | COMMUNITY
Start: 2021-02-17 | End: 1900-01-01

## 2021-02-17 NOTE — ED PROVIDER NOTE - BIRTH SEX
Assessment completed and documented. VSS. A/ox4. C/o 6/10 lower abd pain, given prn pain medication per mar. Stand by assist to [facility]. Patient wants to speak with general surgery before procedure tomorrow. NPO at midnight. RUPAL with small amount of brown thick output. Bed locked and in lowest position. Bedside table and call light within reach. Denies further needs at this time. Male

## 2021-02-18 LAB — SURGICAL PATHOLOGY STUDY: SIGNIFICANT CHANGE UP

## 2021-02-25 NOTE — ED PROVIDER NOTE - CPE EDP ENMT NORM
Prednisone Pregnancy And Lactation Text: This medication is Pregnancy Category C and it isn't know if it is safe during pregnancy. This medication is excreted in breast milk. normal...

## 2021-03-03 NOTE — H&P ADULT - NSCORESITESY/N_GEN_A_CORE_RD
Yes Purse String (Simple) Text: Given the location of the defect and the characteristics of the surrounding skin a purse string closure was deemed most appropriate.  Undermining was performed circumfirentially around the surgical defect.  A purse string suture was then placed and tightened.

## 2021-03-04 NOTE — ED PROVIDER NOTE - CADM POA URETHRAL CATHETER
Health Maintenance Due   Topic Date Due   • DM/CKD Microalbumin  03/10/1980   • DTaP/Tdap/Td Vaccine (1 - Tdap) 03/10/1981   • Shingles Vaccine (1 of 2) 03/10/2012   • Influenza Vaccine (1) 08/01/2020       Patient is  Declining all vaccines at this time.    Working on Advanced directive       No

## 2021-03-05 ENCOUNTER — APPOINTMENT (OUTPATIENT)
Dept: VASCULAR SURGERY | Facility: CLINIC | Age: 41
End: 2021-03-05
Payer: MEDICARE

## 2021-03-05 VITALS — TEMPERATURE: 98.1 F

## 2021-03-05 PROCEDURE — 99024 POSTOP FOLLOW-UP VISIT: CPT

## 2021-03-05 NOTE — REASON FOR VISIT
[de-identified] : Status post resection of aneurysms and a bypass to the internal jugular vein.  Patient reports that all the pigment in the fistula is resolved.  Sutures were removed.  There is a good thrill.  Follow-up in 2 weeks for duplex.

## 2021-03-19 ENCOUNTER — APPOINTMENT (OUTPATIENT)
Dept: VASCULAR SURGERY | Facility: CLINIC | Age: 41
End: 2021-03-19
Payer: MEDICARE

## 2021-03-19 PROCEDURE — 99024 POSTOP FOLLOW-UP VISIT: CPT

## 2021-03-19 PROCEDURE — 93990 DOPPLER FLOW TESTING: CPT

## 2021-03-19 NOTE — REASON FOR VISIT
[de-identified] : Status post resection of aneurysms with a bypass into the IJ.  Incision is well-healed.  Good thrill.  Plan for use of the fistula in 2 weeks.

## 2021-03-25 NOTE — ED ADULT NURSE NOTE - FINAL NURSING ELECTRONIC SIGNATURE
Minoxidil Pregnancy And Lactation Text: This medication has not been assigned a Pregnancy Risk Category but animal studies failed to show danger with the topical medication. It is unknown if the medication is excreted in breast milk. 20-Nov-2018 17:47

## 2021-03-31 NOTE — ED PROVIDER NOTE - ATTESTATION, MLM
Pharyngitis     BASIC INFORMATION  Description  Inflammation or infection of the pharynx. The pharynx is the hollow passage at the back of the throat. It is made up of the nasopharynx, which leads to the nose and oropharynx which leads to the mouth. The larynx (voice box) is located below the pharynx. Pharyngitis occurs in all age groups, but most often affects children.    Frequent Signs and Symptoms  · Sore throat  · Swallowing difficulty  · Tickle or \"lump\" in the throat.  · Fever  · Swollen glands in the neck (sometimes)  · Throat maybe red or covered with a white or grayish membrane (sometimes)  · Body aches (sometimes)    Causes  Viral infection (most common cause) or bacterial infection (such as streptococcus). The germs are spread by person-to-person contact. Rarely, there may be other causes, such as irritation.    Risk increases with  · Common cold, flu, or seasonal allergies.  · Weak immune system due to illness or drugs.  · Smoking or second-hand smoke.  · Chronic illness, such as diabetes.  · Close quarters, such as with  recruits, in schools, and  centers.    Preventive Measures  · Avoid close contact with anyone with a sore throat.  · Avoid germs. Wash hands often, especially children.    Expected Outcomes  Most cases of viral infection clear up on their own in a week. Antibiotic drugs can successfully treat bacterial infections. Complications are rare.    Possible Complications  · Airways may become blocked.  · Abscess (pus-filled area of infection)   · Rheumatic fever, scarlet fever, or glomerulonephritis, if pharyngitis is caused by streptococcal bacteria and does not receive adequate antibiotic treatment.     DIAGNOSIS & TREATMENT  General Measures  · Your health care provider will do an exam of the throat, ears, nose, neck, and lungs. Medical tests may include blood study and throat culture (from a swab of the throat), or rapid strep test find the type of infection.    · Treatment will include self-care measures and antibiotic drugs for bacterial infections. Antibiotics will not help viral infections.  · To relieve the sore throat, gargle frequently with warm or cold double-strength tea or warm salt water (mix on-half teaspoon of salt in one cup of water).   · Wash hands often to help prevent the spread of germs to other family members. Avoid kissing or sharing cups or other utensils.     Medications  · For minor discomfort, you may use nonprescription drugs such as ibuprofen. Don't give aspirin to a child.   · Nonprescription throat lozenges (for patients over age 3) may help ease discomfort.  · Antibiotic drugs are usually prescribed for bacterial infection. Finish entire course of drugs even if symptoms improve.    Activity  Return to normal activities as symptoms improve. A person can no longer spread the germs if they have taken antibiotic drug for at least 24 hours.    Diet  Drink plenty of fluids. If swallowing solid food is painful, try a liquid or soft diet for a few days.    NOTIFY OUR OFFICE IF  · You or a family member has symptoms of pharyngitis  · The following occur during treatment  · Breathing/swallowing difficulty or chest pain.  · Fever worsens or severe headache develops.  · Thick mucous drainage from the nose.  · Cough that produces colored or bloody sputum.  · Skin rash.  · Dark urine.     Copyright © 2005 by [employer]. All rights reserved   I have reviewed and confirmed nurses' notes for patient's medications, allergies, medical history, and surgical history.

## 2021-04-20 ENCOUNTER — APPOINTMENT (OUTPATIENT)
Dept: ENDOVASCULAR SURGERY | Facility: CLINIC | Age: 41
End: 2021-04-20
Payer: MEDICARE

## 2021-04-20 PROCEDURE — 36589 REMOVAL TUNNELED CV CATH: CPT | Mod: 58

## 2021-05-06 ENCOUNTER — APPOINTMENT (OUTPATIENT)
Dept: ENDOVASCULAR SURGERY | Facility: CLINIC | Age: 41
End: 2021-05-06
Payer: MEDICARE

## 2021-05-06 ENCOUNTER — RESULT REVIEW (OUTPATIENT)
Age: 41
End: 2021-05-06

## 2021-05-06 VITALS
SYSTOLIC BLOOD PRESSURE: 164 MMHG | BODY MASS INDEX: 36.45 KG/M2 | DIASTOLIC BLOOD PRESSURE: 103 MMHG | HEIGHT: 78 IN | RESPIRATION RATE: 20 BRPM | WEIGHT: 315 LBS | TEMPERATURE: 98.1 F | OXYGEN SATURATION: 98 % | HEART RATE: 68 BPM

## 2021-05-06 DIAGNOSIS — M79.89 OTHER SPECIFIED SOFT TISSUE DISORDERS: ICD-10-CM

## 2021-05-06 DIAGNOSIS — T82.898A OTHER SPECIFIED COMPLICATION OF VASCULAR PROSTHETIC DEVICES, IMPLANTS AND GRAFTS, INITIAL ENCOUNTER: ICD-10-CM

## 2021-05-06 PROCEDURE — 36902Z: CUSTOM | Mod: 58

## 2021-05-06 PROCEDURE — 36907Z: CUSTOM | Mod: 58,59

## 2021-06-03 NOTE — HISTORY OF PRESENT ILLNESS
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 Foote [FreeTextEntry2] : 2/11/2021 Dr. Foote, removed 4/20/2021 [FreeTextEntry4] : yesterday  [FreeTextEntry5] : yesterday at 9pm 5/5/2021 [FreeTextEntry6] : Dr. Jimenez

## 2021-06-03 NOTE — CONSULT NOTE ADULT - ATTENDING COMMENTS
Counseling included discussion of likely diagnosis, and the need for further evaluation with MRI to determine if surgical intervention is indicated.
152.4

## 2021-06-03 NOTE — PROCEDURE
[Other: _____] : [unfilled] [Resume diet] : resume diet [Site check for bleeding/hematoma/thrill/bruit] : Site check for bleeding/hematoma/thrill/bruit [Vital signs on admission the q 15 mins x2] : Vital signs on admission the q 15 mins x2 [FreeTextEntry1] : left arm fistula fistulogram/angioplasty

## 2021-06-05 NOTE — PATIENT PROFILE ADULT - ABILITY TO HEAR (WITH HEARING AID OR HEARING APPLIANCE IF NORMALLY USED):
----- Message from Shanell Love CNP sent at 1/9/2020  2:24 PM CST -----  Patient is pretty much good to go for watchman device implant.  Please call her tomorrow to set up.  She will start on Eliquis hopefully this evening.  She had 40 falls from January through October 2019.     Adequate: hears normal conversation without difficulty

## 2021-06-07 NOTE — PHYSICAL THERAPY INITIAL EVALUATION ADULT - PHYSICAL ASSIST/NONPHYSICAL ASSIST: STAND/SIT, REHAB EVAL
Consultation - General Surgery   Amanda Costa 67 y o  male MRN: [id number]  Unit/Bed#: -01 Encounter: [id number]    Assessment/Plan     Assessment:  66 Anthony Pagan MD     Authorized by Arlene Manuel MD             Review of Systems   Constitutional: Negative  HENT: Negative  Eyes: Negative  Respiratory: Negative  Cardiovascular: Negative  Gastrointestinal: Positive for diarrhea  Endocrine: Negative  Genitourinary: Negative  Musculoskeletal: Negative  Skin: Negative  Neurological: Negative  Psychiatric/Behavioral: Negative             Historical Information   Past Medical History:   Diagnosis Date    Blind     Chest pain     Diabetes mellitus ([facility] [address] )     GI bleed     Hyperlipidemia     Hypertension     Lumbar disc disease      Past Surgical History:   Procedure Laterality Date    CT GUIDED PERC DRAINAGE CATHETER PLACEMENT  3/7/2021    EXPLORATORY LAPAROTOMY      mva 1982    EYE SURGERY      eye enucleated in accident,also glaucoma    IR CHOLECYSTOSTOMY TUBE CHECK/CHANGE/REPOSITION/REINSERTION/UPSIZE  4/29/2021    JOINT REPLACEMENT      fracture in [address]      06/05/21 1414)  Pulse: 61 (06/05/21 1414)  Temperature: 97 6 °F (36 4 °C) (06/05/21 1424)  Temp Source: Rectal (06/05/21 1424)  Respirations: 18 (06/05/21 1414)  06/07/21 1518)  Temp Source: Oral (06/06/21 1435)  Respirations: 20 (06/07/1 person assist

## 2021-06-11 NOTE — ED PROVIDER NOTE - QRS
PLAN:    Discussed following anti-inflammatory diet may decrease flareups with your back pain, and help with constipation.  This includes decreasing the high fructose corn syrup, sugar, soda, and avoiding the aspartame or NutraSweet in diet soda.    We are sending article about the anti-inflammatory diet from Dr. Curry, an article about the mitochondrial diet which is anti-inflammatory    Continue with the fentanyl 25 mcg patch 3 3 days, and reviewed you are taking hydromorphone 4 mg up to 5 times a day.    Follow-up with Dr. Vazquez in 8 weeks.   78

## 2021-06-18 ENCOUNTER — APPOINTMENT (OUTPATIENT)
Dept: VASCULAR SURGERY | Facility: CLINIC | Age: 41
End: 2021-06-18

## 2021-07-07 NOTE — PATIENT PROFILE ADULT - NSPROALCOHOLUSE2_GEN_A_NUR
Mr. Grullon returns for an office visit after EGD with Botox Injection of the LES about 5 weeks ago.  He offers swallowing difficulty has resolved, and he is belching a little more than usual. No heartburn or reflux on esomeprazole daily.  Weight stable and appetite is good. Energy level is good.  EGD did not reveal alot of polyps in the stomach (previously removed by Dr. Lovett).  No overt GI bleeding.  Labs checked by nephrologist a month ago - Hct 38.%, Hgb 13.2 -- MCV 87 -- on iron supplements still.  Still having some issues with constipation with BMs every 3-4 days.  Has samples of Linzess and offers he plans to try them after coming back from vacation next week.  Fiber supplementation also discussed. yes

## 2021-08-13 NOTE — PROGRESS NOTE ADULT - REASON FOR ADMISSION
Shortness of breath
None known

## 2021-09-15 NOTE — PATIENT PROFILE ADULT - NSPROPASSIVESMOKEEXPOSURE_GEN_A_NUR
"Enoch \"Padmini\"  Danny is a 21  year old female with a history of acute lymphoblastic leukemia. She is here for her routine cancer survivorship evaluation.      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  She was on therapy until 5/27/2016.  She was treated on the [facility] protocol [id number]- VHR experimental arm 2.  She started therapy on 2/24/14 and was completed on 5/27/16.  She received the following chemotherapy on this regimen:  1.  Vincristine IV  2.  Daunorubicin IV with a cumulative dose of 100 mg/m2  3.  Prednisone PO  4.  PEG Asparaginase IV  5.  Cyclophosphamide IV with a cumulative dose of 5.4 GM/m2  6.  Cytarabine IV with a cumulative dose of 600 mg/m2; 42 mg/m2 IT  7.  6 Mercaptopurine PO  8.  Methotrexate PO, 21 GM/m2 (IV), 198 mg/m2 (IT)  9.  Erwinase IM  10.  Clofarabine IV  11.  Etoposide IV with a cumulative dose of 500 mg/m2  12.  Dexamethasone PO  13.  Doxorubicin IV with a cumulative dose of 75 mg/m2    Padmini DID NOT get radiation therapy    Padmini's late effects known to date include:    1.  Prolonged neutropenia- resolved  2.  Left knee discoid lateral meniscus- 2/2015  3.  Hypogammaglobulinemia- resolved  4.  Menorrhagia  5.  Multiple lung infections- resolved    HISTORY OF PRESENT ILLNESS:  Padmini is here today with her mom and new 15 month old son.  She was last seen last year.  Since her last visit, she became pregnant and gave birth to a healthy baby boy (Vadim) on 6/22/2020.  She was followed by OB and MFM.  She had a healthy pregnancy.  She has been doing well.  She has some aching in her bilateral knees off and on.  She did see Dr. Manrique in ortho.  The pain is the same.  She has not decided about whether to get surgery now.  She did have surgery to her left knee for a torn meniscus in 2018.     She is not taking any pain medication currently and has just been dealing with the discomfort.      She is " "working in an assisted living facility.  She hasn't had the COVID vaccine yet.  She has not had COVID 19 although she has taken care of patients with it and also was around her family who had it.   Antibody testing is also negative.  She does have an allergy to PEG which is a component of the Moderna and Pfizer vaccines.   She is interested in meeting with genetics to talk about her son's risk for leukemia since both she and her mom had it.  No HA.  No bleeding or bruising.  No fevers.  Hasn't been doing regular exercise but her job is physical.  Hasn't been sick lately.    Review of systems:  Comprehensive ROS negative except as stated in the HPI    PMH:   Past Medical History:   Diagnosis Date     ALL (acute lymphoblastic leukaemia)     very high risk (negative CNS; loss of 7q and ETV6, iAMP21 +) treated per [facility] protocol [id number]     [place] toe      PONV (postoperative nausea and vomiting)        PFMH:   Family History   Problem Relation Age of Onset     Cancer Mother 3        Acute lymphoblastic leukemia     Cancer Other         Hodgkin Lymphoma; Great Aunt     Diabetes Maternal Grandmother      Coronary Artery Disease Maternal Grandfather      Myocardial Infarction Maternal Grandfather        Social History: Padmini graduated from high school in 2019.  She started [employer] college to pursue an associates degree in nursing.  She did take last year off due to her pregnancy but is back in school now.    Had a healthy baby boy in 6/2020.    Current Medications:   Current Outpatient Medications   Medication     cholecalciferol (VITAMIN D3) 10 mcg (400 units) TABS tablet     sertraline (ZOLOFT) 50 MG tablet     diclofenac (VOLTAREN) 75 MG EC tablet     No current facility-administered medications for this visit.       Physical Exam: /79   Pulse 118   Temp 98.1  F (36.7  C) (Oral)   Resp 18   Ht 1.759 m (5' 9.25\")   Wt 91.9 kg (202 lb 9.6 oz)   SpO2 98%   BMI 29.70 kg/m       Wt Readings from Last 3 " "Encounters:   09/14/21 91.9 kg (202 lb 9.6 oz)   04/05/21 89.8 kg (198 lb) (97 %, Z= 1.91)*   03/22/21 89.8 kg (198 lb) (97 %, Z= 1.91)*     * Growth percentiles are based on [facility] (Girls, 2-20 Years) data.     Ht Readings from Last 2 Encounters:   09/14/21 1.759 m (5' 9.25\")   04/05/21 1.753 m (5' 9\") (97 %, Z= 1.85)*     * Growth percentiles are based on [facility] (Girls, 2-20 Years) data.     Facility age limit for growth percentiles is 20 years.      General: Enoch Delgado is alert, interactive and appropriate for age throughout exam.    Karnofsky/Lansky score is 100  HEENT: Skull is atrauamatic and normocephalic. PERRLA, sclera are non icteric and not injected, EOM are intact.  Nares are patent without drainage.  Oropharynx is clear without exudate, erythema or lesions.  Tympanic membranes are opaque bilaterally with light reflex and landmarks present.  Lymph:  Neck is supple without lymphadenopathy.  There is no supraclavicular, axillay or inguinal lymphadenopathy palpated.  Cardiovascular:  HR is regular, S1, S2 no murmur.  Capillary refill is < 2 seconds.  There is no edema.  Respiratory: Respirations are easy.  Lungs are clear to auscultation through out.  No crackles or wheezes.  Gastrointestinal:  BS present in all quadrants.  Abdomen is soft and non-tender.  No hepatosplenomegaly or masses are palpated.  Genitourinary: deferred  Skin: No rashes, bruises or other skin lesions are noted.   Neurological:  DTR are present and equal at patellas bilaterally.  Gait is normal.  Sensation intact in hands and feet.  Musculoskeletal:  Good strength and ROM in all extremities.  Strong dorsiflexion at ankles bilaterally without any pain at the Achilles.    Labs:     Results for orders placed or performed during the hospital encounter of 09/14/21   Echo Pediatric (TTE) Complete     Status: None    Narrative    [id number]  [id number]  [id number]  [id number]^LUCIO^MONTRELL^MARNI                                                               Study " ID: [id number]                                                 [facility]                                                  [address]                                                [place] [address]                                                Phone: [phone]                                Pediatric Echocardiogram  ______________________________________________________________________________  Name: ALVAREZ CARDOSO  Study Date: 2021 03:21 PM             Patient Location: [facility]  MRN: [id number]                             Age: 21 yrs  : 2000                             BP: 134/76 mmHg  Gender: Female                              HR: 108  Patient Class: Outpatient                   Height: 69 in  Ordering Provider: MONTRELL VALDES              Weight: 198 lb  Referring Provider: MONTRELL VALDES             BSA: 2.1 m2  Performed By: Severson, Jenna M  Report approved by: Emma Almanza MD  Reason For Study: Evaluate LVEF  ______________________________________________________________________________  ##### CONCLUSIONS #####  Normal cardiac anatomy. There is normal appearance and motion of the  tricuspid, mitral, pulmonary and aortic valves. Technically difficult study  due to poor acoustic windows. Normal right and left ventricular size and  function. Calculated single plane left ventricular ejection fraction from the  4 chamber view is 59%. No pericardial effusion.     ______________________________________________________________________________  Technical information:  A complete two dimensional, MMODE, spectral and color Doppler transthoracic  echocardiogram is performed. The study quality is fair. Technically difficult  study due to poor acoustic windows. Images are obtained from parasternal,  apical, subcostal and suprasternal notch views. Prior echocardiogram available  for comparison. ECG tracing  shows sinus tachycardia at 108 bpm.     Segmental Anatomy:  There is normal atrial arrangement, with concordant atrioventricular and  ventriculoarterial connections.     Systemic and pulmonary veins:  The systemic venous return is normal. Color flow demonstrates flow from two  pulmonary veins entering the left atrium.     Atria and atrial septum:  Normal right atrial size. The left atrium is normal in size. There is no  obvious atrial level shunting.     Atrioventricular valves:  The tricuspid valve is normal in appearance and motion. Trivial tricuspid  valve insufficiency. Insufficient jet to estimate right ventricular systolic  pressure. The mitral valve is normal in appearance and motion. Trivial mitral  valve insufficiency.     Ventricles and Ventricular Septum:  Normal right ventricular size. Normal right ventricular systolic function.  Normal left ventricular size. Normal left ventricular systolic function. The  calculated single plane left ventricular ejection fraction from the 4 chamber  view is 59 %. There is no ventricular level shunting.     Outflow tracts:  Normal great artery relationship. There is unobstructed flow through the right  ventricular outflow tract. The pulmonary valve motion is normal. There is  normal flow across the pulmonary valve. There is unobstructed flow through the  left ventricular outflow tract. Tricuspid aortic valve with normal appearance  and motion. There is normal flow across the aortic valve.     Great arteries:  The main pulmonary artery has normal appearance. There is unobstructed flow in  the main pulmonary artery. The pulmonary artery bifurcation is normal. There  is unobstructed flow in both branch pulmonary arteries. Normal ascending  aorta. The aortic arch appears normal. There is unobstructed antegrade flow in  the ascending, transverse arch, descending thoracic and abdominal aorta.     Arterial Shunts:  The ductal region is not imaged with this study.      Coronaries:  Normal origin of the right and left proximal coronary arteries from the  corresponding sinus of Valsalva by 2D.     Effusions, catheters, cannulas and leads:  No pericardial effusion.     MMode/2D Measurements & Calculations  LA dimension: 3.5 cm                       Ao root diam: 2.1 cm  LA/Ao: 1.7                                 4 Chamber EF: 59.0 %  LVMI(BSA): 53.0 grams/m2                   LVMI(Height): 24.7  RWT(MM): 0.30     Doppler Measurements & Calculations  Ao V2 max: 130.3 cm/sec                 LV V1 max: 113.5 cm/sec  Ao max P.8 mmHg                     LV V1 max P.2 mmHg  PA V2 max: 81.4 cm/sec                  RV V1 max: 80.0 cm/sec  PA max P.7 mmHg                     RV V1 max P.6 mmHg  LPA max loraine: 90.8 cm/sec  LPA max PG: 3.3 mmHg  RPA max loraine: 64.7 cm/sec  RPA max P.7 mmHg     desc Ao max loraine: 133.6 cm/sec              MPA max loraine: 72.1 cm/sec  desc Ao max P.1 mmHg                   MPA max P.1 mmHg     [place] 2D Z-SCORE VALUES  Measurement NameValue Z-ScorePredictedNormal Range  asc Aorta(2D)   2.6 cm-0.36  2.7      2.1 - 3.4     [place] Z-Scores (Measurements & Calculations)  Measurement NameValue      Z-ScorePredictedNormal Range  IVSd(MM)        0.82 cm    -1.4   1.1      0.73 - 1.38  LVIDd(MM)       4.7 cm     -1.7   5.3      4.6 - 6.1  LVIDs(MM)       3.1 cm     -0.97  3.5      2.7 - 4.2  LVPWd(MM)       0.70 cm    -2.1   0.99     0.72 - 1.25  LV mass(C)d(MM) 112.0 grams-3.1   210.4    141.7 - 312.4  FS(MM)          33.8 %     -0.33  34.9     28.6 - 42.6     Report approved by: Michelle Ni 2021 03:57 PM         Results for orders placed or performed in visit on 21   CBC with platelets differential     Status: Abnormal    Narrative    The following orders were created for panel order CBC with platelets differential.  Procedure                               Abnormality         Status                     ---------                                -----------         ------                     CBC with platelets and d...[[id number]]  Abnormal            Final result                 Please view results for these tests on the individual orders.   CBC with platelets and differential     Status: Abnormal   Result Value Ref Range    WBC Count 12.6 (H) 4.0 - 11.0 10e3/uL    RBC Count 5.09 3.80 - 5.20 10e6/uL    Hemoglobin 14.1 11.7 - 15.7 g/dL    Hematocrit 43.0 35.0 - 47.0 %    MCV 85 78 - 100 fL    MCH 27.7 26.5 - 33.0 pg    MCHC 32.8 31.5 - 36.5 g/dL    RDW 12.7 10.0 - 15.0 %    Platelet Count 353 150 - 450 10e3/uL    % Neutrophils 68 %    % Lymphocytes 23 %    % Monocytes 6 %    % Eosinophils 2 %    % Basophils 1 %    % Immature Granulocytes 0 %    NRBCs per 100 WBC 0 <1 /100    Absolute Neutrophils 8.6 (H) 1.6 - 8.3 10e3/uL    Absolute Lymphocytes 2.9 0.8 - 5.3 10e3/uL    Absolute Monocytes 0.7 0.0 - 1.3 10e3/uL    Absolute Eosinophils 0.3 0.0 - 0.7 10e3/uL    Absolute Basophils 0.1 0.0 - 0.2 10e3/uL    Absolute Immature Granulocytes 0.0 <=0.0 10e3/uL    Absolute NRBCs 0.0 10e3/uL         Radiology:  None today    Assessment:  Enoch Delgado is a 21 year female with a history of B cell ALL s/p chemotherapy.  She has been off therapy ~5 years and here for her routine cancer survivorship evaluation.  She is doing well post therapy with no clinical evidence of disease recurrence.  She had a healthy baby boy last summer.  She is having more issues with her bilateral knees having pain and saw ortho.  She also would like to meet with genetic counseling to discuss testing for her son since both she and her mom had leukemia.   She does have an elevated BMI and should work to increase physical activity. Does have a slightly elevated WBC today but hasn't been sick.  The following are our long term follow up recommendations.    New late effects:  Chronic bilateral knee pain    Plan:     1.  RISK OF RECURRENCE: Padmini is ~5 years off therapy from ALL  therapy.  CBC today is normal.  I recommend that she come back in 6 months for CBC recheck and then we will transition her to yearly visits.    2.  PSYCHOSOCIAL EFFECTS:  Padmini has a history of chemotherapy that can affect cognition and learning.  She had formal neuropsych testing a few years ago.  She reports doing well in school and attending college.  I recommend that she repeat testing as needed.  I discussed that she could talk with the office of disability services about getting academic accommodations.  She also met with social work last visit.  Please see their notation for details.    3.   RISK FOR PERIPHERAL NEUROPATHY:  Padmnii has a history of vincristine and risk for neuropathy.  No signs on exam today.    4.  RISK FOR CARDIAC TOXICITY:  Padmini has a history of anthracycline chemotherapy and risk for cardiomyopathy.  She had an echo in 2016 that was normal.  She needs an echo every 5 years.  We will repeat this in 2021.   Done today and WNL.    There is also a risk for metabolic syndrome after leukemia therapy.  In the future, Padmini needs a fasting BG and lipids every 2-3 years.  We did screen with a hemoglobin A1C that was normal.  I did discuss that she should eat a heart healthy diet and increase her physical activity given her BMI and for overall general health.  We will continue to follow this.    5.  BONE HEALTH:  Padmini has a history of steroids and methotrexate which places her at risk for osteopenia.  She had a DEXA in 4/2021 that was WNL.  She did see ortho a few months ago about her knee pain.  They are just observing for now.     6.  FEMALE ISSUES RELATED TO FERTILITY: Padmini had a moderate amount of alkylating agents so is at risk for diminished ovarian reserve and fertility issues.  Has not had any fertility issues and had a healthy baby boy 6/2020.    7.  RISK FOR SECONDARY NEOPLASMS:  Padmini has a history of chemotherapy that places her at risk for secondary myelodysplasia.  We recommend that she  have at least yearly CBCs until 10 years off therapy.  She should wear high SPF sunscreen when outdoors.      8.  GENETICS:  Padmini and her mom both had ALL and genetic testing in the past.  No known genetic mutations were found.  We will continue follow her family history.  She would like testing for her son and I did put in a counseling referral for that discussion.    9. RISK FOR RENAL TOXICITY:  Padmini had chemotherapy which can place her at risk for kidney toxicity. She should have yearly BP and urinary history.  We checked a baseline CMP that was normal.      10.  RISK FOR LIVER TOXICITY:  Padmini has a history of chemotherapy which can affect her liver function.  She had a baseline CMP that was normal.    It was a pleasure meeting with Padmini today.  We appreciate the opportunity to participate in her care.  If you have any questions or concerns, I can be reached at [phone].  We ask that Padmini return to see us in 6 months with a CBC .       Meron Burton MSN, APRN, CPNP-AC, CPON  Department of Pediatrics  Division of Hematology/Oncology    Review of the result(s) of each unique test - CBC, echocardiogram  40 minutes spent on the date of the encounter doing chart review, history and exam, documentation and further activities per the note    Participation in the childhood Cancer Survivor Program database (IRB study # [id number] - Late Effects in Survivors of Cancer, Stem Cell Transplantation, or Blood Disorders) was reviewed with the participant (and/or family) and all risks and benefits were explained. I answered all questions and reviewed all information as listed on our written consent document. Participant (and/or parent) is happy to consent and has no further questions.    {             Unknown

## 2021-09-17 ENCOUNTER — APPOINTMENT (OUTPATIENT)
Dept: VASCULAR SURGERY | Facility: CLINIC | Age: 41
End: 2021-09-17

## 2021-10-13 NOTE — ED ADULT NURSE NOTE - SUICIDE SCREENING QUESTION 3
Problem: OT General Care Plan  Goal: OT Goal 1  Description: Will consistently attend OT groups and improve coping strategies with increasing repertoire of ideas and understanding of symptoms of when to use the strategies.       Outcome: Improving  Note: Attended 3 of 3 OT groups. The first session was a goal oriented task group for 55 minutes with 6 pts. Work was organized and detail oriented. She was social and offered assistance and support in a kind and thoughtful manner. She also participated in an activity using visuospatial problem solving which appeared easy and efficient for her. She offered assistance to a peer in a subtle and again, thoughtful manner. She stated looking forward to connecting with support as an OP once home.  In the afternoon group she participated for 50 minutes with 5 patients on the topic of resiliency.  She continues to offer insightful comments and stated DBT has been enormously helpful in her life.  She continues to utilize the techniques she has learned through DBT and stated she would not have survived without it.  Comments are positive focused.  She is supportive of others and draws others into conversations in a thoughtful manner.  She talked about the volunteer work that she has done which is also been helpful and offered information to others of the benefits and volunteering and helping others.      No

## 2021-11-01 NOTE — PATIENT PROFILE ADULT - INFORMATION PROVIDED TO:
patient Tranexamic Acid Counseling:  Patient advised of the small risk of bleeding problems with tranexamic acid. They were also instructed to call if they developed any nausea, vomiting or diarrhea. All of the patient's questions and concerns were addressed.

## 2021-11-10 NOTE — ED PROVIDER NOTE - LATERALITY
11/10/2021         RE: Jennifer Cervantes  [address]  [facility] [address]        Dear Colleague,    Thank you for referring your patient, Jennifer Cervantes, to the [facility]. Please see a copy of my visit note below.    Jennifer is a 23 year old who is being evaluated via a billable video visit.      How would you like to obtain your AVS? MyChart  If the video visit is dropped, the invitation should be resent by: Text to cell phone: [phone]  Will anyone else be joining your video visit? No       Telephone visit: 15 minutes  >30 minutes spent discussing with RN, MD, and ED team and coordinating care.     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      She was admitted 2/1/21-2/3/21 with a new PE, started on Rivaroxaban. Switched to Eliquis 3/25/21 with RUE DVT.     She was admitted 4/26/21-5/11/21 with sickle cell pain crisis complicated by worsening PE in setting of low Apixaban levels, acute hypoxic respiratory failure, pneumonia, acute chest syndrome s/p exchange transfusion on 4/30 and 5/4. After 2nd exchange her oxygen requirement dramatically improved from 20L to 1-2L 5/5 and she was off oxygen as of 5/6.      She was admitted 7/13/21-7/25/21 for acute on chronic PE, switched to dabigitran + ASA.      She has been doing better recently. Her pain plan was changed to no IV opioids in ED and she has not been in ED for 6 weeks.      She was called today for hematology follow-up.      Interval History:  Jennifer was called today for follow-up. She continues to have ORTEGA, no SOB at rest. No chest pain, no fevers, no cough, no leg swelling, no orthopnea. Denies any missed doses of her Pradaxa. Still having back pain, pain was worse after her recent desferal infusion. Includes her entire back. Infusions have helped. The cold weather has also contributed to her pain. No GI issues. No lightheadedness. Headaches come and go.     Current Outpatient Medications   Medication Sig Dispense Refill     acetaminophen (TYLENOL) 325 MG tablet Take 2 tablets (650 mg) by mouth every 6 hours as needed for mild pain 120 tablet 3     albuterol (PROAIR HFA/PROVENTIL HFA/VENTOLIN HFA) 108 (90 Base) MCG/ACT inhaler Inhale 2 puffs into the lungs every 6 hours as needed for shortness of breath / dyspnea or wheezing 8.5 g 3     albuterol (PROVENTIL) (2.5 MG/3ML) 0.083% neb solution Take 1 vial (2.5 mg) by nebulization every 6 hours as needed for shortness of breath / dyspnea or wheezing 12 mL 4     ARIPiprazole (ABILIFY) 2 MG tablet Take 1 tablet (2 mg) by mouth daily 30 tablet 3     aspirin (ASA) 81 MG chewable tablet Take 1 tablet (81 mg) by mouth daily 90 tablet 3     budesonide-formoterol (SYMBICORT) 160-4.5 MCG/ACT Inhaler Inhale 2 puffs into the lungs 2 times daily 10.2 g 3     dabigatran ANTICOAGULANT (PRADAXA) 150 MG capsule Take 1 capsule (150 mg) by mouth 2 times daily Store in original 's bottle or blister pack; use within 120 days of opening. 60 capsule 3     diphenhydrAMINE (BENADRYL) 25 MG capsule Take 1-2 capsules (25-50 mg) by mouth nightly as needed for sleep 60 capsule 3     DULoxetine (CYMBALTA) 30 MG capsule Take 1 capsule (30 mg) by mouth 2 times daily 60 capsule 3     EPINEPHrine (ANY BX GENERIC EQUIV) 0.3 MG/0.3ML injection 2-pack Inject 0.3 mLs (0.3 mg) into the muscle as needed for anaphylaxis 1 each 1     Hydroxyurea 1000 MG TABS Take 2,000 mg by mouth daily 60 tablet 3      hydrOXYzine (ATARAX) 25 MG tablet Take 1 tablet (25 mg) by mouth 3 times daily as needed for anxiety 30 tablet 1     JADENU 360 MG tablet Take 4 tablets (1,440 mg) by mouth every evening 120 tablet 4     lidocaine-prilocaine (EMLA) 2.5-2.5 % external cream Apply topically once for 1 dose Use for port site as needed 30 g 1     medroxyPROGESTERone (DEPO-PROVERA) 150 MG/ML IM injection Inject 150 mg into the muscle       morphine (MS CONTIN) 15 MG CR tablet Take 1 tablet (15 mg) by mouth every 12 hours Take 1/2 tablet every 12 hours initially 60 tablet 0     naloxone (NARCAN) 4 MG/0.1ML nasal spray Spray 1 spray (4 mg) into one nostril alternating nostrils once as needed for opioid reversal every 2-3 minutes until assistance arrives 0.2 mL 1     omeprazole (PRILOSEC) 20 MG DR capsule Take 1 capsule (20 mg) by mouth daily 30 capsule 1     ondansetron (ZOFRAN) 8 MG tablet Take 1 tablet (8 mg) by mouth every 8 hours as needed 30 tablet 1     oxyCODONE IR (ROXICODONE) 15 MG tablet Take 1 tablet (15mg) by mouth every 4-6 hours as needed for severe pain. Goal 4 per day. Max 6 per day. 50 tablet 0       Past Medical History  Past Medical History:   Diagnosis Date     Anxiety      Bleeding disorder (H)      Blood clotting disorder (H)      Cerebral infarction (H) 2015     Cognitive developmental delay     low IQ. Please recognize when managing pain and planning with her     Depressive disorder      Hemiplegia and hemiparesis following cerebral infarction affecting right dominant side (H)     right hand contractures     Iron overload due to repeated red blood cell transfusions      Migraines      Multiple subsegmental pulmonary emboli without acute cor pulmonale (H) 02/01/2021     Oppositional defiant behavior      Superficial venous thrombosis of arm, right 03/25/2021     Uncomplicated asthma      Past Surgical History:   Procedure Laterality Date     AS INSERT TUNNELED CV 2 CATH W/O PORT/PUMP       C BREAST REDUCTION  (INCLUDES LIPO) TIER 3 Bilateral 04/23/2019     CHOLECYSTECTOMY       INSERT PORT VASCULAR ACCESS Left 4/21/2021    Procedure: INSERTION, VASCULAR ACCESS PORT (NOT SURE ON SIDE UNTIL REMOVAL);  Surgeon: Rajan More MD;  Location: [facility] OR     IR CHEST PORT PLACEMENT > 5 YRS OF AGE  4/21/2021     IR CVC NON TUNNEL LINE REMOVAL  5/6/2021     IR CVC NON TUNNEL PLACEMENT  04/07/2020     IR CVC NON TUNNEL PLACEMENT  4/30/2021     IR PORT REMOVAL LEFT  4/21/2021     REMOVE PORT VASCULAR ACCESS Left 4/21/2021    Procedure: REMOVAL, VASCULAR ACCESS PORT LEFT;  Surgeon: Rajan More MD;  Location: [facility] OR     REPAIR TENDON ELBOW Right 10/02/2019    Procedure: Right Elbow Flexor Lengthening, Flexor Pronator Slide Of Wrist and Finger, Thumb Adductor Lengthening;  Surgeon: Anai Franco MD;  Location: [facility] OR     TONSILLECTOMY Bilateral 10/02/2019    Procedure: Bilateral Tonsillectomy;  Surgeon: Farhana Guy MD;  Location: [facility] OR     Allergies   Allergen Reactions     Contrast Dye      Hives and breathing issues     Fish-Derived Products Hives     Seafood Hives     Diagnostic X-Ray Materials      Gadolinium      Social History   Social History     Tobacco Use     Smoking status: Never Smoker     Smokeless tobacco: Never Used   Substance Use Topics     Alcohol use: Not Currently     Alcohol/week: 0.0 standard drinks     Drug use: Never      Past medical history and social history were reviewed.    Physical Examination:  There were no vitals taken for this visit.  Wt Readings from Last 10 Encounters:   11/03/21 75.8 kg (167 lb 3.2 oz)   11/01/21 75.8 kg (167 lb)   10/19/21 77 kg (169 lb 11.2 oz)   10/13/21 77.2 kg (170 lb 4.8 oz)   09/29/21 77.1 kg (170 lb)   09/22/21 77.7 kg (171 lb 3.2 oz)   09/20/21 78 kg (172 lb)   09/17/21 78 kg (172 lb)   09/15/21 78 kg (172 lb)   09/12/21 77.1 kg (170 lb)     Unable to do physical exam 2/2 telephone visit. Well sounding in no distress. Normal speech and thought  process. Good voice quality. No audible wheezing or cough.    O2 sats from 11/10/21 Infusion  O2 at rest on RA: 90%  O2 with exertion on RA: 82%  O2 at rest on 2L via NC: 93%  O2 with exertion on 2L via NC: 92%    Laboratory Data:  Results for HIMANSHU AL (MRN [id number]) as of 11/10/2021 15:37   11/10/2021 11:09   Sodium 138   Potassium 3.8   Chloride 112 (H)   Carbon Dioxide 19 (L)   Urea Nitrogen 12   Creatinine 0.49 (L)   GFR Estimate >90   Calcium 8.3 (L)   Anion Gap 7   Albumin 3.9   Protein Total 7.8   Bilirubin Total 3.8 (H)   Alkaline Phosphatase 84   ALT 94 (H)   AST See Comment   N-Terminal Pro Bnp 31   Troponin I <0.015   Glucose 113 (H)   WBC 13.4 (H)   Hemoglobin 8.0 (L)   Hematocrit 21.0 (L)   Platelet Count 286   RBC Count 2.35 (L)   MCV 89   MCH 34.0 (H)   MCHC 37.5 (H)   RDW 25.4 (H)   % Neutrophils 69   % Lymphocytes 19   % Monocytes 6   % Eosinophils 5   % Basophils 1   Absolute Basophils 0.1   NRBC/W 23 (H)   Absolute Neutrophil 9.2 (H)   Absolute Lymphocytes 2.5   Absolute Monocytes 0.8   Absolute Eosinophils 0.7   Absolute NRBCs 3.1 (H)   RBC Morphology Confirmed RBC Indices   Platelet Morphology Automated Count Confirmed. Platelet morphology is normal.   Polychromasia Moderate (A)   Sickle Cells Marked (A)   Target Cells Slight (A)   % Retic 21.8 (H)   Absolute Retic 0.512 (H)   D-Dimer Quantitative 8.20 (H)     CXR 11/10/21  Findings:   Frontal and lateral view x-ray of the chest. Left chest wall  Port-A-Cath with tip projecting over the superior cavoatrial junction.  No acute osseous abnormality. No acute airspace opacity. No  appreciable pneumothorax or pleural effusion. Visualized upper abdomen  is within normal limits. Cardiomediastinal silhouette is within normal  limits.                                                                      Impression: No acute airspace opacity.     PIPPA LUNDBERG MD       Assessment and Plan:  1. Sickle Cell Disease  HgbSS complicated by hx stroke,  acute chest, iron overload, chronic pain, acute on chronic pulmonary embolism with multiple hospitalizations. She has been doing better recently with no ED visits, though she is in the infusion center almost daily. For now no changes to plan, though will need to strategize ways to lessen infusion room utilization such as scheduling 2-3x per week. Given acute hypoxia concerns today did not discuss.      Labs: Hgb stsable. WBC slightly elevated but at baseline. Retic slightly elevated. CMP at baseline with mild LFT /bili elevation. Also checked NT-BNP and troponin, negative. D-Dimer more elevated-see below.     Meds: Stopped Voxeletor. Continue Hydrea 2000mg daily. Contineu Jadenu 4 tablets daily. She is now on Crizanlizumab monthly last given 10/19-due 11/16. Continue Desferal every other weekend.      Pain Control: Continue MSContin 15mg BID. Continue Oxycodone to 15mg PRN max 6 per day-#50 tabs per week. DO NOT INCREASE. Continue Cymbalta 30mg BID. Continue Tylenol PRN.      Follow-up: Continue ANDREAS or MD monthly     2. Neuro  History of stroke. Continue ASA.     Dr. Pierce doing botox for spasticity and symptoms improving.      Migraines, chronic issue, saw neurology. Has follow-up with specialist scheduled in Jan     3. Psych  History of anxiety and depression. Doing well with Cymbalta and Abilify.      Follows with outside therapist.     OK to use Benadryl PRN insomnia.      4. Pulm  History of asthma, continue Symbicort and Albuterol PRN. Still needs to see pulm, not discussed.     Acute on chronic PE and DVT, failure of apixaban and rivaroxaban despite therapeutic levels. Now on Dabigitran + ASA.     Worsening dyspnea x 4 weeks. Last week sats were >90%. Now she is desating to 82% on RA. CXR unrevealing. Hgb OK. Echo scheduled, though NT-BNP is WNL.     Will get VQ scan to r/o worsening DVT in setting of further increased D-dimer.    Did ask [facility] to arrange for home O2.    Addendum:    Did do VQ scan that is  left concerning for new PE. Patient will go to ED now to be admitted for management. Heme team aware. Called ED with report. Pending pain in ED while waiting for bed can do 1 time dose of IV morphine but then should try to stick to previously agreed upon pain plan.     Andrei Machado PA-C  Department of Hematology and Oncology  [facility]         Again, thank you for allowing me to participate in the care of your patient.        Sincerely,        RONALDO Allan

## 2022-02-02 NOTE — ED ADULT NURSE NOTE - NSFALLRSKPASTHIST_ED_ALL_ED
"    I N T E R N A L  M E D I C I N E  J U N O H  K I M,  M D      ENCOUNTER DATE:  02/02/2022    Brant Vasquez / 33 y.o. / male      CHIEF COMPLAINT / REASON FOR OFFICE VISIT     Rib Pain (right . 2 weeks ago 1/21/21. excersice )      ASSESSMENT & PLAN     1. Rib pain on right side      Orders Placed This Encounter   Procedures   • XR Ribs Right With PA Chest     No orders of the defined types were placed in this encounter.      SUMMARY/DISCUSSION  • Check xray   • Voltaren 1% ointment PRN. May use OTC Aleve/Advil PRN.   • Avoid impact activity for 2 weeks to allow complete healing.       Next Appointment with me: Visit date not found    No follow-ups on file.        VITAL SIGNS     Visit Vitals  /70 (BP Location: Left arm)   Pulse 65   Temp 98 °F (36.7 °C)   Ht 172.7 cm (68\")   Wt 73 kg (161 lb)   SpO2 99%   BMI 24.48 kg/m²       BP Readings from Last 3 Encounters:   02/02/22 100/70   08/25/21 104/60   01/20/21 110/70     Wt Readings from Last 3 Encounters:   02/02/22 73 kg (161 lb)   08/25/21 73.9 kg (163 lb)   01/20/21 74.8 kg (165 lb)     Body mass index is 24.48 kg/m².      MEDICATIONS AT THE TIME OF OFFICE VISIT     Current Outpatient Medications on File Prior to Visit   Medication Sig   • Multiple Vitamins-Minerals (ZINC PO) Take  by mouth.   • Omega-3 Fatty Acids (fish oil) 1000 MG capsule capsule Take  by mouth Daily With Breakfast.   • vitamin D3 125 MCG (5000 UT) capsule capsule Take 5,000 Units by mouth Daily.     No current facility-administered medications on file prior to visit.          HISTORY OF PRESENT ILLNESS     2 weeks of right lower rib pain after grappling (Jujitsu). Was improving with chiropractics, dry needling but went back to same activity which aggravated the pain. Denies shortness of breath or severe pain. Pain is intermittent and aggravated with certain jerky movements.         REVIEW OF SYSTEMS     No shortness of breath  No back pain  No abdominal pain "         PHYSICAL EXAMINATION     Physical Exam  Chest:       Musculoskeletal:      Comments: No spinal/rib tenderness on his back        No acute distress       REVIEWED DATA     Labs:     Lab Results   Component Value Date     01/27/2021    K 4.5 01/27/2021    CALCIUM 9.5 01/27/2021    AST 21 01/27/2021    ALT 16 01/27/2021    BUN 16 01/27/2021    CREATININE 0.96 01/27/2021    EGFRIFNONA 91 01/27/2021    EGFRIFAFRI 111 01/27/2021       Lab Results   Component Value Date    HGBA1C 5.30 01/27/2021       Lab Results   Component Value Date     (H) 01/27/2021    HDL 60 01/27/2021    TRIG 62 01/27/2021       Lab Results   Component Value Date    TSH 2.640 01/27/2021    FREET4 1.23 01/27/2021       Lab Results   Component Value Date    WBC 6.32 08/25/2021    HGB 14.2 08/25/2021     08/25/2021       No results found for: MICROALBUR        Imaging:           Medical Tests:           Summary of old records / correspondence / consultant report:           Request outside records:             *Examiner was wearing KN95 mask and eye protection during the entire duration of the visit. Patient was masked the entire time. Minimum social distance of 6 ft maintained entire visit except if physical contact was necessary as documented.       Template created by Sarah Hayes MD      no

## 2022-02-07 NOTE — ED ADULT TRIAGE NOTE - HEART RATE (BEATS/MIN)
Sertraline refill sent by on call provider on 2/5/2022.
Suki Cavazos calling and left voicemail stating that patient needs a refill on her sertraline 100 mg tablet. States that patient will be out tomorrow.
104

## 2022-02-23 ENCOUNTER — TRANSCRIPTION ENCOUNTER (OUTPATIENT)
Age: 42
End: 2022-02-23

## 2022-02-23 ENCOUNTER — APPOINTMENT (OUTPATIENT)
Dept: ENDOVASCULAR SURGERY | Facility: CLINIC | Age: 42
End: 2022-02-23
Payer: MEDICARE

## 2022-02-23 ENCOUNTER — NON-APPOINTMENT (OUTPATIENT)
Age: 42
End: 2022-02-23

## 2022-02-23 ENCOUNTER — RESULT REVIEW (OUTPATIENT)
Age: 42
End: 2022-02-23

## 2022-02-23 VITALS
DIASTOLIC BLOOD PRESSURE: 74 MMHG | OXYGEN SATURATION: 98 % | HEART RATE: 72 BPM | SYSTOLIC BLOOD PRESSURE: 113 MMHG | RESPIRATION RATE: 20 BRPM | WEIGHT: 315 LBS | HEIGHT: 78 IN | TEMPERATURE: 98 F | BODY MASS INDEX: 36.45 KG/M2

## 2022-02-23 DIAGNOSIS — Z99.2 END STAGE RENAL DISEASE: ICD-10-CM

## 2022-02-23 DIAGNOSIS — N18.6 END STAGE RENAL DISEASE: ICD-10-CM

## 2022-02-23 PROCEDURE — 36907Z: CUSTOM | Mod: 59

## 2022-02-23 PROCEDURE — 36902Z: CUSTOM

## 2022-02-23 RX ORDER — ISOSORBIDE DINITRATE 30 MG/1
30 TABLET ORAL
Refills: 0 | Status: DISCONTINUED | COMMUNITY
End: 2022-02-23

## 2022-02-23 RX ORDER — MONTELUKAST 10 MG/1
10 TABLET, FILM COATED ORAL
Refills: 0 | Status: ACTIVE | COMMUNITY

## 2022-02-23 RX ORDER — CARVEDILOL 6.25 MG/1
6.25 TABLET, FILM COATED ORAL
Refills: 0 | Status: DISCONTINUED | COMMUNITY
End: 2022-02-23

## 2022-02-23 RX ORDER — CLOPIDOGREL BISULFATE 75 MG/1
75 TABLET, FILM COATED ORAL DAILY
Qty: 90 | Refills: 3 | Status: DISCONTINUED | COMMUNITY
Start: 2019-02-26 | End: 2022-02-23

## 2022-02-23 RX ORDER — SUCROFERRIC OXYHYDROXIDE 500 MG/1
500 TABLET, CHEWABLE ORAL
Refills: 0 | Status: ACTIVE | COMMUNITY

## 2022-02-23 RX ORDER — OMEPRAZOLE 40 MG/1
40 CAPSULE, DELAYED RELEASE ORAL
Refills: 0 | Status: ACTIVE | COMMUNITY

## 2022-02-23 RX ORDER — HYDRALAZINE HYDROCHLORIDE 50 MG/1
50 TABLET ORAL
Refills: 0 | Status: DISCONTINUED | COMMUNITY
End: 2022-02-23

## 2022-02-23 RX ORDER — HYDROCORTISONE 25 MG/G
2.5 CREAM TOPICAL
Refills: 0 | Status: ACTIVE | COMMUNITY

## 2022-02-23 RX ORDER — PANTOPRAZOLE SODIUM 40 MG/10ML
40 INJECTION, POWDER, FOR SOLUTION INTRAVENOUS
Refills: 0 | Status: DISCONTINUED | COMMUNITY
End: 2022-02-23

## 2022-02-23 RX ORDER — AMLODIPINE BESYLATE 5 MG/1
TABLET ORAL
Refills: 0 | Status: DISCONTINUED | COMMUNITY
End: 2022-02-23

## 2022-02-23 RX ORDER — INSULIN GLARGINE 100 [IU]/ML
100 INJECTION, SOLUTION SUBCUTANEOUS
Refills: 0 | Status: DISCONTINUED | COMMUNITY
End: 2022-02-23

## 2022-02-23 RX ORDER — NIFEDIPINE 30 MG/1
30 TABLET, FILM COATED, EXTENDED RELEASE ORAL
Refills: 0 | Status: ACTIVE | COMMUNITY

## 2022-02-23 RX ORDER — METOPROLOL TARTRATE 50 MG/1
50 TABLET, FILM COATED ORAL
Refills: 0 | Status: DISCONTINUED | COMMUNITY
End: 2022-02-23

## 2022-02-23 RX ORDER — GABAPENTIN 300 MG/1
300 CAPSULE ORAL
Refills: 0 | Status: DISCONTINUED | COMMUNITY
End: 2022-02-23

## 2022-02-23 RX ORDER — LEVOFLOXACIN 500 MG/1
500 TABLET, FILM COATED ORAL
Refills: 0 | Status: DISCONTINUED | COMMUNITY
End: 2022-02-23

## 2022-02-23 RX ORDER — SIMVASTATIN 40 MG/1
40 TABLET, FILM COATED ORAL
Refills: 0 | Status: DISCONTINUED | COMMUNITY
End: 2022-02-23

## 2022-02-23 RX ORDER — QUETIAPINE FUMARATE 50 MG/1
50 TABLET ORAL
Refills: 0 | Status: ACTIVE | COMMUNITY

## 2022-02-23 RX ORDER — HYDROXYZINE HYDROCHLORIDE 25 MG/1
25 TABLET ORAL
Refills: 0 | Status: ACTIVE | COMMUNITY

## 2022-02-23 RX ORDER — MIRTAZAPINE 45 MG/1
45 TABLET, FILM COATED ORAL
Refills: 0 | Status: DISCONTINUED | COMMUNITY
End: 2022-02-23

## 2022-02-23 RX ORDER — HALOPERIDOL 2 MG/1
2 TABLET ORAL
Refills: 0 | Status: ACTIVE | COMMUNITY

## 2022-02-23 RX ORDER — MIRTAZAPINE 15 MG/1
15 TABLET, FILM COATED ORAL
Refills: 0 | Status: ACTIVE | COMMUNITY

## 2022-02-23 RX ORDER — ATORVASTATIN CALCIUM 40 MG/1
40 TABLET, FILM COATED ORAL
Refills: 0 | Status: DISCONTINUED | COMMUNITY
End: 2022-02-23

## 2022-02-23 NOTE — PROCEDURE
[Resume diet] : resume diet [Site check for bleeding/hematoma/thrill/bruit] : Site check for bleeding/hematoma/thrill/bruit [Vital signs on admission the q 15 mins x2] : Vital signs on admission the q 15 mins x2 [FreeTextEntry1] : left arm AVG fistulogram/angioplasty

## 2022-02-23 NOTE — HISTORY OF PRESENT ILLNESS
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 Foote [FreeTextEntry2] : 2/11/2021 Dr. Foote, removed 4/20/2021 [FreeTextEntry4] : this morning  [FreeTextEntry5] : yesterday 8pm [FreeTextEntry6] : Dr. Gordillo

## 2022-03-11 NOTE — H&P ADULT - PEDAL EDEMA SEVERITY
CC:  Primitivo Weiss is here today for Pre-Op Exam (left eye surgery)   Patient having left eye surgery on 4/4/22 with Dr. Bertha Macedo at [email or web address]  Fax results to both:  [facility]  [phone]  [facility] [[email or web address]]  [phone]    Medications: medications verified, no change    Refills needed today?  NO    Latex allergy or sensitivity: No known latex allergy     Patient would like communication of their results via:  [facility]    Patient's current [facility] status: Active.    Advanced directives: discussed    Care Teams Verified: No Changes    Depression Screen: yes    Tobacco history: verified    Health Maintenance Due   Topic Date Due   • Pneumococcal Vaccine 0-64 (1 of 4 - PCV13) Never done       Patient is due for topics as listed above but is not proceeding with Immunization(s) Pneumococcal at this time.     Allergies   Allergen Reactions   • No Known Drug Allergy         1+

## 2022-03-14 NOTE — ED ADULT NURSE NOTE - SUICIDE SCREENING DEPRESSION
We received remote transmission from patient's monitor at home. Transmission shows normal sensing and pacing function. EP physician will review. See interrogation under cardiology tab in the [address] [facility] [address] field for more details. Optivol is stable.
Negative

## 2022-03-31 NOTE — PHYSICAL THERAPY INITIAL EVALUATION ADULT - IMPAIRMENTS FOUND, PT EVAL

## 2022-04-01 NOTE — DISCHARGE NOTE PROVIDER - DISCHARGE DATE
- history of thyrotoxicosis with amiodarone  - continue mexiletine and  beta blocker as above  - maintain K 4-4.5 and Mg 2-2.5  - EP following 12-Feb-2021 15-Feb-2021

## 2022-04-12 NOTE — PATIENT PROFILE ADULT - NSPROGENBLOODRESTRICT_GEN_A_NUR
[facility]  Urgent Care  [address] , [address]  [facility] [address]  Phone: [phone]  Fax: [phone] April 12, 2022    Patient: Anthony Sexton   Patient ID [id number]   YOB: 1987   Date of Visit: 4/12/2022       To Whom It May Concern:    Anthony Sexton was seen and treated in our urgent care department on 4/12/2022. She may return on April 19, 2022.    Sincerely,       Fátima Silverman NP        none

## 2022-04-18 NOTE — PROGRESS NOTE ADULT - PROBLEM/PLAN-2
DISPLAY PLAN FREE TEXT Quality 130: Documentation Of Current Medications In The Medical Record: Current Medications Documented Detail Level: Detailed Quality 402: Tobacco Use And Help With Quitting Among Adolescents: Patient screened for tobacco and never smoked Quality 110: Preventive Care And Screening: Influenza Immunization: Influenza Immunization previously received during influenza season Quality 226: Preventive Care And Screening: Tobacco Use: Screening And Cessation Intervention: Patient screened for tobacco use and is an ex/non-smoker Quality 431: Preventive Care And Screening: Unhealthy Alcohol Use - Screening: Patient not identified as an unhealthy alcohol user when screened for unhealthy alcohol use using a systematic screening method

## 2022-05-06 NOTE — DISCHARGE NOTE NURSING/CASE MANAGEMENT/SOCIAL WORK - NSDCPEFALRISK_GEN_ALL_CORE
Patient information on fall and injury prevention PAST MEDICAL HISTORY:  AML (acute myeloid leukemia)     History of genital warts     Hypertension diagnosed 1 year ago    Kidney stone 5 years ago

## 2022-05-11 NOTE — PATIENT PROFILE ADULT - NSPROGENBLOODRESTRICT_GEN_A_NUR
Goal Outcome Evaluation:  Plan of Care Reviewed With: patient        Progress: improving   Patient is alert and oriented times four. Ambulates to the restroom independently. Antibiotics given as ordered for cellulitis to right lower extremity. Norco given once for complaints of pain to his right calf. Will continue to monitor.   none

## 2022-05-20 ENCOUNTER — APPOINTMENT (OUTPATIENT)
Dept: VASCULAR SURGERY | Facility: CLINIC | Age: 42
End: 2022-05-20

## 2022-05-24 NOTE — PROCEDURE
Refill request. Order pended. LOV 01/03/22  
2 seconds or less
[FreeTextEntry1] : Echo- 3/19- moderate concentric LVH\par PFTs- Spirometry shows no obstruction. Lung volumes show moderate restriction. DLCO is mildly reduced.

## 2022-05-27 NOTE — CONSULT NOTE ADULT - PROBLEM SELECTOR PROBLEM 2
Anesthesia Volume In Cc: 5 Did You Provide Opioid Counseling: No Repair Type: Complex Repair Suturegard Retention Suture: 2-0 Nylon Retention Suture Bite Size: 3 mm Length To Time In Minutes Device Was In Place: 10 Number Of Hemigard Strips Per Side: 1 Simple / Intermediate / Complex Repair - Final Wound Length In Cm: 5.8 Complex Requirements: Extensive Undermining Performed?: Yes HTN (hypertension) Width Of Defect Perpendicular To Closure In Cm (Required): 1.5 Distance Of Undermining In Cm (Required): 2.2 Undermining Type: Entire Wound Debridement Text: The wound edges were debrided prior to proceeding with the closure to facilitate wound healing. Helical Rim Text: The closure involved the helical rim. Vermilion Border Text: The closure involved the vermilion border. Nostril Rim Text: The closure involved the nostril rim. Retention Suture Text: Retention sutures were placed to support the closure and prevent dehiscence. Secondary Defect Length (In Cm): 0 Skin Substitute: EpiFix Micronized Suture Removal: 14 days Type Of Previous Surgery (Optional- Ie Mohs Surgery): mohs Date Of Previous Surgery (Optional): 5/27/22 Pre-Op Size Of Lesion Removed (Optional): 1.4 X Size Of Lesion In Cm (Optional): 1.3 Detail Level: Detailed Anesthesia Type: 0.5% lidocaine with 1:200,000 epinephrine and a 1:10 solution of 8.4% sodium bicarbonate Hemostasis: Electrocoagulation Estimated Blood Loss (Cc): minimal Brow Lift Text: A midfrontal incision was made medially to the defect to allow access to the tissues just superior to the left eyebrow. Following careful dissection inferiorly in a supraperiosteal plane to the level of the left eyebrow, several 3-0 monocryl sutures were used to resuspend the eyebrow orbicularis oculi muscular unit to the superior frontal bone periosteum. This resulted in an appropriate reapproximation of static eyebrow symmetry and correction of the left brow ptosis. Deep Sutures: 5-0 Monocryl Additional Deep Sutures: 4-0 PDS Epidermal Sutures: 6-0 Prolene Epidermal Closure: running and interrupted Wound Care: Vaseline Dressing: pressure dressing with telfa Unna Boot Text: An Unna boot was placed to help immobilize the limb and facilitate more rapid healing. Suturegard Intro: Intraoperative tissue expansion was performed, utilizing the SUTUREGARD device, in order to reduce wound tension. Suturegard Body: The suture ends were repeatedly re-tightened and re-clamped to achieve the desired tissue expansion. Hemigard Intro: Due to skin fragility and wound tension, it was decided to use HEMIGARD adhesive retention suture devices to permit a linear closure. The skin was cleaned and dried for a 6cm distance away from the wound. Excessive hair, if present, was removed to allow for adhesion. Hemigard Postcare Instructions: The HEMIGARD strips are to remain completely dry for at least 5-7 days. Graft Donor Site Dermal Sutures (Optional): 4-0 Monocryl Graft Donor Site Epidermal Sutures (Optional): 5-0 Nylon Epidermal Closure Graft Donor Site (Optional): running Graft Donor Site Bandage (Optional-Leave Blank If You Don't Want In Note): A pressure bandage with telfa was applied to the donor site. Closure 2 Information: This tab is for additional flaps and grafts, including complex repair and grafts and complex repair and flaps. You can also specify a different location for the additional defect, if the location is the same you do not need to select a new one. We will insert the automated text for the repair you select below just as we do for solitary flaps and grafts. Please note that at this time if you select a location with a different insurance zone you will need to override the ICD10 and CPT if appropriate. Closure 3 Information: This tab is for additional flaps and grafts above and beyond our usual structured repairs.  Please note if you enter information here it will not currently bill and you will need to add the billing information manually. Closure 4 Information: This tab is for additional flaps and grafts above and beyond our usual structured repairs.  Please note if you enter information here it will not currently bill and you will need to add the billing information manually. Complex Repair Preamble Text (Leave Blank If You Do Not Want): .\\n\\nGiven the location of the defect a COMPLEX REPAIR was deemed most appropriate. Using a sterile surgical marker, Burow’s triangles were drawn incorporating the defect and placing the expected incisions within the relaxed skin tension lines where possible. The area thus outlined was incised to the appropriate tissue plane with a scalpel blade. Extensive wide undermining was performed. Intermediate Repair Preamble Text (Leave Blank If You Do Not Want): .\\n\\nGiven the location of the defect an INTERMEDIATE REPAIR was deemed most appropriate. Using a sterile surgical marker, Burow’s triangles were drawn incorporating the defect and placing the expected incisions within the relaxed skin tension lines where possible. The area thus outlined was incised to the appropriate tissue plane with a scalpel blade. Undermining was performed. Crescentic Complex Repair Preamble Text (Leave Blank If You Do Not Want): .\\n\\nGiven the location of the defect a CRESCENTIC COMPLEX REPAIR was deemed most appropriate. Using a sterile surgical marker, Burow’s triangles were drawn incorporating the defect and placing the expected incisions within the relaxed skin tension lines where possible in a crescentic fashion. The area thus outlined was incised to the appropriate tissue plane with a scalpel blade. Extensive wide undermining was performed. Flap Thinning Complex Repair Preamble Text (Leave Blank If You Do Not Want): .\\n\\nFLAP THINNING with a subsequent COMPLEX REPAIR was performed through an incision was made along the previous flap suture line. Undermining was performed beneath the flap and redundant tissue was removed to restore the normal contour of the skin. Lazy S Complex Repair Preamble Text (Leave Blank If You Do Not Want): .\\n\\nGiven the location of the defect a LAZY S COMPLEX REPAIR was deemed most appropriate. Using a sterile surgical marker, Burow’s triangles were drawn incorporating the defect and placing the expected incisions in a loose S-shape to reduce tension on the surgical site. The area thus outlined was incised to the appropriate tissue plane with a scalpel blade. Extensive wide undermining was performed. M-Plasty Complex Repair Preamble Text (Leave Blank If You Do Not Want): .\\n\\nGiven the location of the defect a COMPLEX REPAIR with an M-PLASTY was deemed most appropriate. Using a sterile surgical marker, Burow’s triangles were drawn incorporating the defect and placing the expected incisions within the relaxed skin tension lines where possible. The area thus outlined was incised to the appropriate tissue plane with a scalpel blade. Extensive wide undermining was performed. An M-plasty was performed at one end of the repair given the anatomical location of the surgical defect. Double M-Plasty Complex Repair Preamble Text (Leave Blank If You Do Not Want): .\\n\\nGiven the location of the defect a COMPLEX REPAIR with a DOUBLE M-PLASTY was deemed most appropriate. Using a sterile surgical marker, Burow’s triangles were drawn incorporating the defect and placing the expected incisions within the relaxed skin tension lines where possible. The area thus outlined was incised to the appropriate tissue plane with a scalpel blade. Extensive wide undermining was performed. M-plasties were performed at both ends of the repair given the anatomical location of the surgical defect. Crescentic Intermediate Repair Preamble Text (Leave Blank If You Do Not Want): .\\n\\nGiven the location of the defect an CRESCENTIC INTERMEDIATE REPAIR was deemed most appropriate. Using a sterile surgical marker, Burow’s triangles were drawn incorporating the defect and placing the expected incisions within the relaxed skin tension lines where possible in a crescentic fashion. The area thus outlined was incised to the appropriate tissue plane with a scalpel blade. Undermining was performed. Lazy S Intermediate Repair Preamble Text (Leave Blank If You Do Not Want): .\\n\\nGiven the location of the defect a LAZY S INTERMEDIATE REPAIR was deemed most appropriate. Using a sterile surgical marker, Burow’s triangles were drawn incorporating the defect and placing the expected incisions in a loose S-shape to reduce tension on the surgical site. The area thus outlined was incised to the appropriate tissue plane with a scalpel blade. Undermining was performed. M-Plasty Intermediate Repair Preamble Text (Leave Blank If You Do Not Want): .\\n\\nGiven the location of the defect an INTERMEDIATE REPAIR with a M-PLASTY was deemed most appropriate. Using a sterile surgical marker, Burow’s triangles were drawn incorporating the defect and placing the expected incisions within the relaxed skin tension lines where possible. The area thus outlined was incised to the appropriate tissue plane with a scalpel blade. Undermining was performed. An M-plasty was performed at one end of the repair given the anatomical location of the surgical defect. Double M-Plasty Intermediate Repair Preamble Text (Leave Blank If You Do Not Want): .\\n\\nGiven the location of the defect an INTERMEDIATE REPAIR with a DOUBLE M-PLASTY was deemed most appropriate. Using a sterile surgical marker, Burow’s triangles were drawn incorporating the defect and placing the expected incisions within the relaxed skin tension lines where possible. The area thus outlined was incised to the appropriate tissue plane with a scalpel blade. Undermining was performed. M-plasties were performed at both ends of the repair given the anatomical location of the surgical defect. Non-Graft Cartilage Fenestration Text: .\\n\\nThe CARTILAGE was FENESTRATED with a 2 mm punch biopsy to help facilitate healing. Graft Cartilage Fenestration Text: .\\n\\nThe CARTILAGE was FENESTRATED with a 2 mm punch biopsy to help facilitate graft survival and healing. Preparation Of Recipient Site - Flap: .\\n\\nPREPARATION OF THE RECIPIENT SITE for the FLAP was performed through the surgical removal of the eschar with sharp dissection to facilitate appropriate wound healing of the following adjacent tissue rearrangement. Preparation Of Recipient Site - Graft: .\\n\\nPREPARATION OF THE RECIPIENT SITE for the GRAFT was performed through the surgical removal of the eschar with sharp dissection to facilitate appropriate survival of the following graft. Preparation Of Recipient Site - Flap Takedown: .\\n\\nPREPARATION OF THE RECIPIENT SITE DURING FLAP TAKEDOWN was performed through the surgical removal of the eschar and granulation tissue with sharp dissection to facilitate appropriate healing after division and inset of the proximal and distal interpolation flap. Secondary Intention Text (Leave Blank If You Do Not Want): .\\n\\nA discussion of the surgical repair options and healing by SECOND INTENTION was performed including anticipated differences in the healing duration, wound care, scar appearance, discomfort, and restrictions. The patient verbalized understanding and elected to let the defect heal by secondary intention. No Repair - Repaired With Adjacent Surgical Defect Text (Leave Blank If You Do Not Want): .\\n\\nAfter obtaining clear surgical margins, the defect was REPAIRED CONCURRENTLY with an ADJACENT SURGICAL DEFECT, which was in close approximation. Referred To Oculoplastics For Closure Text (Leave Blank If You Do Not Want): .\\n\\nAfter obtaining clear surgical margins the patient was sent to OCULOPLASTICS for surgical repair.  The patient understands they will receive post-surgical care and follow-up from the referring physician's office. Referred To Otolaryngology For Closure Text (Leave Blank If You Do Not Want): .\\n\\nAfter obtaining clear surgical margins, the patient was sent to OTOLARYNGOLOGY for surgical repair.  The patient understands they will receive post-surgical care and follow-up from the referring physician's office. Referred To Plastics For Closure Text (Leave Blank If You Do Not Want): .\\n\\nAfter obtaining clear surgical margins, the patient was sent to PLASTICS for surgical repair.  The patient understands they will receive post-surgical care and follow-up from the referring physician's office. Referred To Asc For Closure Text (Leave Blank If You Do Not Want): After obtaining clear surgical margins the patient was sent to an ASC for surgical repair.  The patient understands they will receive post-surgical care and follow-up from the ASC physician. Referred To Mid-Level For Closure Text (Leave Blank If You Do Not Want): .\\n\\nAfter obtaining clear surgical margins, the patient was sent to a MID-LEVEL PROVIDER for surgical repair.  The patient understands they will receive post-surgical care and follow-up from the mid-level provider. Repair Performed By Another Provider Text (Leave Blank If You Do Not Want): .\\n\\nAfter obtaining clear surgical margins, the defect was repaired by ANOTHER PROVIDER. Adjacent Tissue Transfer Text: .\\n\\nGiven the location of the defect and the tension on the surgical defect, an ADJACENT TISSUE TRANSFER was deemed most appropriate.  Using a sterile surgical marker, an appropriate flap was drawn incorporating the defect and placing the expected incisions within the relaxed skin tension lines where possible.  The area thus outlined was incised deep to adipose tissue with a #15 scalpel blade.  The skin margins were undermined to an appropriate distance in all directions. Advancement Flap (Single) Text: .\\n\\nGiven the location of the defect and the tension on the surgical defect, an ADVANCEMENT FLAP was deemed most appropriate.  Using a sterile surgical marker, an appropriate advancement flap was drawn incorporating the defect and placing the expected incisions within the relaxed skin tension lines where possible. The area thus outlined was incised deep to adipose tissue with a #15 scalpel blade.  The skin margins were undermined to an appropriate distance in all directions. Advancement Flap (Double) Text: .\\n\\nGiven the location of the defect and the tension on the surgical defect, a DOUBLE ADVANCEMENT FLAP was deemed most appropriate.  Using a sterile surgical marker, the appropriate advancement flaps were drawn incorporating the defect and placing the expected incisions within the relaxed skin tension lines where possible. The area thus outlined was incised deep to adipose tissue with a #15 scalpel blade.  The skin margins were undermined to an appropriate distance in all directions. Burow's Advancement Flap Text: .\\n\\nGiven the location of the defect, tension on the surgical defect, and proximity to an adjacent surgical site, a BUROW'S EXCHANGE ADVANCEMENT FLAP was deemed most appropriate. Using a sterile surgical marker, the appropriate advancement flap was drawn incorporating the defect and placing the expected incisions within the relaxed skin tension lines where possible. The area thus outlined was incised deep to adipose tissue with a #15 scalpel blade. The skin margins were undermined to an appropriate distance in all directions. Chonodrocutaneous Helical Advancement Flap Text: .\\n\\nGiven the location of the defect and the proximity to free margins, a CHONDROCUTANEOUS HELICAL ADVANCEMENT FLAP was deemed most appropriate. Using a sterile surgical marker, the appropriate advancement flap was drawn incorporating the defect and placing the expected incisions within the relaxed skin tension lines where possible. The area thus outlined was incised deep to adipose tissue with a #15 scalpel blade. The skin margins were undermined to an appropriate distance in all directions. Crescentic Advancement Flap Text: .\\n\\nGiven the location of the defect and the proximity to free margins, a CRESCENTIC ADVANCEMENT FLAP was deemed most appropriate. Using a sterile surgical marker, the appropriate advancement flap was drawn incorporating the defect and placing the expected incisions within the relaxed skin tension lines where possible. The area thus outlined was incised deep to adipose tissue with a #15 scalpel blade.  The skin margins were undermined to an appropriate distance in all directions. A-T Advancement Flap Text: .\\n\\nGiven the location of the defect, shape of the defect and the tension on the surgical defect, an A-T ADVANCEMENT FLAP was deemed most appropriate. Using a sterile surgical marker, an appropriate advancement flap was drawn incorporating the defect and placing the expected incisions within the relaxed skin tension lines where possible. The area thus outlined was incised deep to adipose tissue with a #15 scalpel blade. The skin margins were undermined to an appropriate distance in all directions. O-T Advancement Flap Text: .\\n\\nGiven the location of the defect, shape of the defect and the tension on the surgical defect, an O-T ADVANCEMENT FLAP was deemed most appropriate. Using a sterile surgical marker, an appropriate advancement flap was drawn incorporating the defect and placing the expected incisions within the relaxed skin tension lines where possible. The area thus outlined was incised deep to adipose tissue with a #15 scalpel blade. The skin margins were undermined to an appropriate distance in all directions. O-L Flap Text: .\\n\\nGiven the location of the defect, shape of the defect and the proximity to free margins, an O-L ADVANCEMENT FLAP was deemed most appropriate. Using a sterile surgical marker, an appropriate advancement flap was drawn incorporating the defect and placing the expected incisions within the relaxed skin tension lines where possible. The area thus outlined was incised deep to adipose tissue with a #15 scalpel blade. The skin margins were undermined to an appropriate distance in all directions. O-Z Flap Text: .\\n\\nGiven the location of the defect, shape of the defect and the tension on the surgical defect, an O-Z FLAP was deemed most appropriate.  Using a sterile surgical marker, an appropriate transposition flap was drawn incorporating the defect and placing the expected incisions within the relaxed skin tension lines where possible. The area thus outlined was incised deep to adipose tissue with a #15 scalpel blade.  The skin margins were undermined to an appropriate distance in all directions. Double O-Z Flap Text: .\\n\\nGiven the location of the defect, shape of the defect and the proximity to free margins, a DOUBLE O-Z FLAP was deemed most appropriate. Using a sterile surgical marker, an appropriate transposition flap was drawn incorporating the defect and placing the expected incisions within the relaxed skin tension lines where possible. The area thus outlined was incised deep to adipose tissue with a #15 scalpel blade. The skin margins were undermined to an appropriate distance in all directions. V-Y Flap Text: .\\n\\nGiven the location of the defect, shape of the defect and the proximity to free margins, a V-Y ADVANCEMENT FLAP was deemed most appropriate.  Using a sterile surgical marker, an appropriate advancement flap was drawn incorporating the defect and placing the expected incisions within the relaxed skin tension lines where possible. The area thus outlined was incised deep to adipose tissue with a #15 scalpel blade.  The skin margins were undermined to an appropriate distance in all directions. Advancement-Rotation Flap Text: .\\n\\nGiven the location of the defect, shape of the defect and the proximity to free margins, an ADVANCEMENT-ROTATION flap was deemed most appropriate. Using a sterile surgical marker, an appropriate flap was drawn incorporating the defect and placing the expected incisions within the relaxed skin tension lines where possible. The area thus outlined was incised deep to adipose tissue with a #15 scalpel blade. The skin margins were undermined to an appropriate distance in all directions utilizing iris scissors. Mercedes Flap Text: .\\n\\nGiven the location of the defect, shape of the defect and the proximity to free margins, a MERCEDES FLAP was deemed most appropriate.  Using a sterile surgical marker, an appropriate advancement flap was drawn incorporating the defect and placing the expected incisions within the relaxed skin tension lines where possible. The area thus outlined was incised deep to adipose tissue with a #15 scalpel blade.  The skin margins were undermined to an appropriate distance in all directions utilizing iris scissors. Modified Advancement Flap Text: .\\n\\nGiven the location of the defect, shape of the defect and the tension on the surgical defect, a MODIFIED ADVANCEMENT FLAP was deemed most appropriate.  Using a sterile surgical marker, an appropriate advancement flap was drawn incorporating the defect and placing the expected incisions within the relaxed skin tension lines where possible. The area thus outlined was incised deep to adipose tissue with a #15 scalpel blade.  The skin margins were undermined to an appropriate distance in all directions. Mucosal Advancement Flap Text: .\\n\\nGiven the location of the defect, shape of the defect and the proximity to free margins, a MUCOSAL ADVANCEMENT FLAP was deemed most appropriate. Incisions were made with a 15 blade scalpel in the appropriate fashion along the cutaneous vermilion border and the mucosal lip. The remaining actinically damaged mucosal tissue was excised.  The mucosal advancement flap was then elevated to the gingival sulcus with care taken to preserve the neurovascular structures and advanced into the primary defect. Care was taken to ensure that precise realignment of the vermilion border was achieved. Peng Advancement Flap Text: .\\n\\nGiven the location of the defect, shape of the defect and the proximity to free margins, a MYOCUTANEOUS PENG ADVANCEMENT FLAP was deemed most appropriate.  Using a sterile surgical marker, an appropriate advancement flap was drawn incorporating the defect and placing the expected incisions within the relaxed skin tension lines where possible. The flap was incised horizontally along the alar crease and then vertically along the nasolabial crease.  The flap was dissected over the cartilage and bone to create a myocutaneous flap including the nasalis muscle which was required to be part of the flap both for flap vascularization and to provide proper soft tissue coverage of the cartilanginous base of the surgical defect.  The flap was appropriately trimmed and transferred to cover the surgical defect.  The skin edges were approximated and sutured in a multilayered fashion. Hatchet Flap Text: .\\n\\nGiven the location of the defect, shape of the defect and the proximity to free margins, a HATCHET FLAP was deemed most appropriate.  Using a sterile surgical marker, an appropriate hatchet flap was drawn incorporating the defect and placing the expected incisions within the relaxed skin tension lines where possible.  The area thus outlined was incised deep to adipose tissue with a #15 scalpel blade.  The skin margins were undermined to an appropriate distance in all directions. Rotation Flap Text: .\\n\\nGiven the location of the defect, shape of the defect and the tension on the surgical defect, a ROTATION FLAP was deemed most appropriate.  Using a sterile surgical marker, an appropriate rotation flap was drawn incorporating the defect and placing the expected incisions within the relaxed skin tension lines where possible.  The area thus outlined was incised deep to adipose tissue with a #15 scalpel blade.  The skin margins were undermined to an appropriate distance in all directions utilizing iris scissors. Spiral Flap Text: .\\n\\nGiven the location of the defect, shape of the defect and the proximity to free margins, a SPIRAL FLAP was deemed most appropriate.  Using a sterile surgical marker, an appropriate rotation flap was drawn incorporating the defect and placing the expected incisions within the relaxed skin tension lines where possible. The area thus outlined was incised deep to adipose tissue with a #15 scalpel blade.  The skin margins were undermined to an appropriate distance in all directions. Staged Advancement Flap Text: .\\n\\nGiven the location of the defect, shape of the defect and the proximity to free margins, a STAGED ADVANCEMENT FLAP was deemed most appropriate.  Using a sterile surgical marker, an appropriate advancement flap was drawn incorporating the defect and placing the expected incisions within the relaxed skin tension lines where possible. The area thus outlined was incised deep to adipose tissue with a #15 scalpel blade.  The skin margins were undermined to an appropriate distance in all directions. Star Wedge Flap Text: .\\n\\nGiven the location of the defect, shape of the defect and the proximity to free margins, a STAR WEDGE FLAP was deemed most appropriate.  Using a sterile surgical marker, an appropriate rotation flap was drawn incorporating the defect and placing the expected incisions within the relaxed skin tension lines where possible. The area thus outlined was incised deep to adipose tissue with a #15 scalpel blade.  The skin margins were undermined to an appropriate distance in all directions. Transposition Flap Text: .\\n\\nGiven the location of the defect and the proximity to free margins, a TRANSPOSITION FLAP was deemed most appropriate.  Using a sterile surgical marker, an appropriate transposition flap was drawn incorporating the defect. The area thus outlined was incised deep to adipose tissue with a #15 scalpel blade.  The skin margins were undermined to an appropriate distance in all directions. Muscle Hinge Flap Text: .\\n\\nGiven the size, depth and location of the defect and the proximity to free margins, a MUSCLE HINGE FLAP was deemed most appropriate.  Using a sterile surgical marker, an appropriate hinge flap was drawn incorporating the defect. The area thus outlined was incised with a #15 scalpel blade.  The skin margins were undermined to an appropriate distance in all directions. Mustarde Flap Text: .\\n\\nGiven the size, depth and location of the defect and the proximity to free margins, a MUSTARDE FLAP was deemed most appropriate.  Using a sterile surgical marker, an appropriate flap was drawn incorporating the defect. The area thus outlined was incised with a #15 scalpel blade.  The skin margins were undermined to an appropriate distance in all directions. Nasal Turnover Hinge Flap Text: .\\n\\nGiven the size, depth, location of the defect and the defect being full thickness, a NASAL TURNOVER HINGE FLAP was deemed most appropriate.  Using a sterile surgical marker, an appropriate hinge flap was drawn incorporating the defect. The area thus outlined was incised with a #15 scalpel blade. The flap was designed to recreate the nasal mucosal lining and the alar rim. The skin margins were undermined to an appropriate distance in all directions. Nasalis-Muscle-Based Myocutaneous Island Pedicle Flap Text: .\\n\\nThe NASALIS MYOCUTANEOUS ISLAND PEDICLE FLAP was incised using a #15 blade such as to dissect the nasalis muscle off the cartilage and create a flap that included the nasalis muscle and overlying subcutaneous tissue and skin.  Wide lateral undermining was then performed in both the subcutaneous plane above the nasalis muscle, and in a submuscular plane just above periosteum. This allowed the formation of a free nasalis muscle axial pedicle (based on the ANGULAR ARTERY) which was still attached to the actual cutaneous flap, increasing its mobility and vascular viability.\\n\\nTransfer of the nasalis muscle as part of the reconstruction was deemed critical to proper contour reconstruction.  The incisions created a unilateral nasalis myocutaneous flap, which was transferred to cover the surgical defect by imbrication of the nasalis muscle.   \\n\\nHemostasis was obtained with pinpoint electrocoagulation. The flap was mobilized into position and the pivotal anchor points positioned and stabilized with buried interrupted sutures. Subcutaneous and dermal tissues were closed in a multilayered fashion with sutures. Tissue redundancies were excised, and the epidermal edges were apposed without significant tension and sutured with sutures. Orbicularis Oris Muscle Flap Text: .\\n\\nGiven that the defect affected the competency of the oral sphincter, an ORBICULARIS ORIS MUSCLE FLAP was deemed most appropriate to restore this competency and normal muscle function.  Using a sterile surgical marker, an appropriate flap was drawn incorporating the defect. The area thus outlined was incised with a #15 scalpel blade. The flap was elevated, moved, and sutured into place. Melolabial Transposition Flap Text: .\\n\\nGiven the location of the defect and the proximity to free margins, a MELOLABIAL TRANSPOSITION FLAP was deemed most appropriate.  Using a sterile surgical marker, an appropriate melolabial transposition flap was drawn incorporating the defect.  The area thus outlined was incised deep to adipose tissue with a #15 scalpel blade.  The skin margins were undermined to an appropriate distance in all directions. Rhombic Flap Text: .\\n\\nGiven the location of the defect and the proximity to free margins, a RHOMBIC TRANSPOSITION FLAP was deemed most appropriate.  Using a sterile surgical marker, an appropriate rhombic flap was drawn incorporating the defect.  The area thus outlined was incised deep to adipose tissue with a #15 scalpel blade.  The skin margins were undermined to an appropriate distance in all directions. Rhomboid Transposition Flap Text: .\\n\\nGiven the location of the defect and the proximity to free margins, a RHOMBOID TRANSPOSITION FLAP was deemed most appropriate.  Using a sterile surgical marker, an appropriate rhomboid flap was drawn incorporating the defect.  The area thus outlined was incised deep to adipose tissue with a #15 scalpel blade.  The skin margins were undermined to an appropriate distance in all directions. Bi-Rhombic Flap Text: .\\n\\nGiven the location of the defect and the proximity to free margins, a BI-RHOMBIC FLAP was deemed most appropriate.  Using a sterile surgical marker, an appropriate rhombic flap was drawn incorporating the defect. The area thus outlined was incised deep to adipose tissue with a #15 scalpel blade.  The skin margins were undermined to an appropriate distance in all directions. Helical Rim Advancement Flap Text: .\\n\\nGiven the location of the defect and the proximity to free margins (helical rim), a HELICAL RIM ADVANCEMENT FLAP was deemed most appropriate.  Using a sterile surgical marker, the appropriate advancement flaps were drawn incorporating the defect and placing the expected incisions between the helical rim and antihelix where possible.  The area thus outlined was incised through and through with a #15 scalpel blade.  With a skin hook, the flaps were gently and sharply undermined and freed up. Bilateral Helical Rim Advancement Flap Text: .\\n\\nGiven the location of the defect and the proximity to free margins (helical rim), a BILATERAL HELICAL RIM ADVANCEMENT FLAP was deemed most appropriate.  Using a sterile surgical marker, the appropriate advancement flaps were drawn incorporating the defect and placing the expected incisions between the helical rim and antihelix where possible.  The area thus outlined was incised through and through with a #15 scalpel blade.  With a skin hook, the flaps were gently and sharply undermined and freed up. Ear Star Wedge Flap Text: .\\n\\nGiven the location of the defect and the proximity to free margins (helical rim), an EAR STAR WEDGE FLAP was deemed most appropriate.  Using a sterile surgical marker, the appropriate flap was drawn incorporating the defect and placing the expected incisions between the helical rim and antihelix where possible.  The area thus outlined was incised through and through with a #15 scalpel blade. Banner Transposition Flap Text: .\\n\\nGiven the location of the defect and the proximity to free margins, a BANNER TRANSPOSITION FLAP was deemed most appropriate.  Using a sterile surgical marker, an appropriate flap drawn around the defect. The area thus outlined was incised deep to adipose tissue with a #15 scalpel blade.  The skin margins were undermined to an appropriate distance in all directions. Bilobed Flap Text: .\\n\\nGiven the location of the defect and the proximity to free margins, a BILOBED TRANSPOSITION FLAP was deemed most appropriate.  Using a sterile surgical marker, an appropriate bilobe flap drawn around the defect. The area thus outlined was incised deep to adipose tissue with a #15 scalpel blade.  The skin margins were undermined to an appropriate distance in all directions. Bilobed Transposition Flap Text: .\\n\\nGiven the location of the defect and the proximity to free margins, a BILOBED TRANSPOSITION FLAP was deemed most appropriate.  Using a sterile surgical marker, an appropriate bilobe flap drawn around the defect. The area thus outlined was incised deep to adipose tissue with a #15 scalpel blade.  The skin margins were undermined to an appropriate distance in all directions. Trilobed Flap Text: .\\n\\nGiven the location of the defect and the proximity to free margins, a TRILOBED FLAP was deemed most appropriate.  Using a sterile surgical marker, an appropriate trilobed flap drawn around the defect.  The area thus outlined was incised deep to adipose tissue with a #15 scalpel blade.  The skin margins were undermined to an appropriate distance in all directions. Dorsal Nasal Flap Text: .\\n\\nGiven the location of the defect and the proximity to free margins, a DORSAL NASAL FLAP was deemed most appropriate.  Using a sterile surgical marker, an appropriate dorsal nasal flap was drawn around the defect. The area thus outlined was incised deep to adipose tissue with a #15 scalpel blade.  The skin margins were undermined to an appropriate distance in all directions. Island Pedicle Flap Text: .\\n\\nGiven the location of the defect, shape of the defect and the proximity to free margins, an ISLAND PEDICLE ADVANCEMENT FLAP was deemed most appropriate. Using a sterile surgical marker, an appropriate advancement flap was drawn incorporating the defect, outlining the appropriate donor tissue and placing the expected incisions within the relaxed skin tension lines where possible.  The area thus outlined was incised deep to adipose tissue with a #15 scalpel blade.  The skin margins were undermined to an appropriate distance in all directions around the primary defect and laterally outward around the island pedicle.  There was minimal undermining beneath the pedicle flap. Island Pedicle Flap With Canthal Suspension Text: .\\n\\nGiven the location of the defect, shape of the defect and the proximity to free margins, an ISLAND PEDICLE FLAP with a CANTHAL SUSPENSION was deemed most appropriate. Using a sterile surgical marker, an appropriate advancement flap was drawn incorporating the defect, outlining the appropriate donor tissue and placing the expected incisions within the relaxed skin tension lines where possible. The area thus outlined was incised deep to adipose tissue with a #15 scalpel blade.  The skin margins were undermined to an appropriate distance in all directions around the primary defect and laterally outward around the island pedicle.  There was minimal undermining beneath the pedicle flap. A suspension suture was placed in the canthal tendon to prevent tension and prevent ectropion. Alar Island Pedicle Flap Text: .\\n\\nGiven the location of the defect, shape of the defect and the proximity to the alar rim, an ALAR ISLAND PEDICLE ADVANCEMENT FLAP was deemed most appropriate.  Using a sterile surgical marker, an appropriate advancement flap was drawn incorporating the defect, outlining the appropriate donor tissue and placing the expected incisions within the nasal ala running parallel to the alar rim. The area thus outlined was incised with a #15 scalpel blade.  The skin margins were undermined minimally to an appropriate distance in all directions around the primary defect and laterally outward around the island pedicle.  There was minimal undermining beneath the pedicle flap. Double Island Pedicle Flap Text: .\\n\\nGiven the location of the defect, shape of the defect and the proximity to free margins, a DOUBLE ISLAND PEDICLE ADVANCEMENT FLAP was deemed most appropriate.  Using a sterile surgical marker, an appropriate advancement flap was drawn incorporating the defect, outlining the appropriate donor tissue and placing the expected incisions within the relaxed skin tension lines where possible. The area thus outlined was incised deep to adipose tissue with a #15 scalpel blade.  The skin margins were undermined to an appropriate distance in all directions around the primary defect and laterally outward around the island pedicle.  There was minimal undermining beneath the pedicle flap. Island Pedicle Flap-Requiring Vessel Identification Text: Given the location of the defect, shape of the defect and the proximity to free margins, a TRANSCARTILAGE ISLAND PEDICLE FLAP with INCISION OF THE CARTILAGE was deemed appropriate. The flap was incised in the postauricular sulcus, undermined circumferentially, and dissected such as to include the POSTERIOR AURICULAR ARTERY, an axial vessel. The WOUND WAS SURGICALLY PREPARED FOR RECEIPT of the island pedicle flap by excision of a window of cartilage that would allow the flap and its pedicle to pass through.   Utilizing this defect in the cartilage, the flap was then passed through the cartilaginous defect to the anterior ear defect.  The flap was sewn into place.  The secondary defect in the postauricular sulcus was repaired in a multilayered fashion creating an intermediate linear repair.  There was minimal undermining beneath the pedicle flap. Keystone Flap Text: .\\n\\nGiven the location of the defect, shape of the defect, a FASCIOCUTANEOUS KEYSTONE FLAP was deemed most appropriate.  Using a sterile surgical marker, an appropriate keystone flap was drawn incorporating the defect, outlining the appropriate donor tissue and placing the expected incisions within the relaxed skin tension lines where possible. The flap was incised circumferentially and dissected such as to create a fasciocutaneous keystone flap whose only vascular supply and connection was through fascial attachments.  The flap was then transferred to cover the surgical defect.  The skin edges were approximated and sutured. O-T Plasty Text: .\\n\\nGiven the location of the defect, shape of the defect and tension on the surgical defect, an O-T PLASTY was deemed most appropriate.  Using a sterile surgical marker, an appropriate O-T plasty was drawn incorporating the defect and placing the expected incisions within the relaxed skin tension lines where possible.  The area thus outlined was incised deep to adipose tissue with a #15 scalpel blade.  The skin margins were undermined to an appropriate distance in all directions. O-Z Plasty Text: .\\n\\nGiven the location of the defect, shape of the defect and tension on the surgical defect, an O-Z PLASTY (TRANSPOSITION FLAP) was deemed most appropriate.  Using a sterile surgical marker, the appropriate transposition flaps were drawn incorporating the defect and placing the expected incisions within the relaxed skin tension lines where possible.    The area thus outlined was incised deep to adipose tissue with a #15 scalpel blade.  The skin margins were undermined to an appropriate distance in all directions.  Hemostasis was achieved with electrocautery.  The flaps were then transposed into place, one clockwise and the other counterclockwise, and anchored with interrupted buried subcutaneous sutures. Double O-Z Plasty Text: .\\n\\nGiven the location of the defect, shape of the defect and the tension on the surgical defect, a DOUBLE O-Z PLASTY (DOUBLE TRANSPOSITION FLAP) was deemed most appropriate.  Using a sterile surgical marker, the appropriate transposition flaps were drawn incorporating the defect and placing the expected incisions within the relaxed skin tension lines where possible. The area thus outlined was incised deep to adipose tissue with a #15 scalpel blade.  The skin margins were undermined to an appropriate distance in all directions.  Hemostasis was achieved with electrocautery.  The flaps were then transposed into place, one clockwise and the other counterclockwise, and anchored with interrupted buried subcutaneous sutures. V-Y Plasty Text: .\\n\\nGiven the location of the defect, shape of the defect and the proximity to free margins, an V-Y PLASTY FLAP was deemed most appropriate.  Using a sterile surgical marker, an appropriate advancement flap was drawn incorporating the defect and placing the expected incisions within the relaxed skin tension lines where possible.  The area thus outlined was incised deep to adipose tissue with a #15 scalpel blade.  The skin margins were undermined to an appropriate distance in all directions. H Plasty Text: .\\n\\nGiven the location of the defect, shape of the defect and the proximity to free margins, a H-PLASTY was deemed most appropriate for repair.  Using a sterile surgical marker, the appropriate advancement arms of the H-plasty were drawn incorporating the defect and placing the expected incisions within the relaxed skin tension lines where possible. The area thus outlined was incised deep to adipose tissue with a #15 scalpel blade. The skin margins were undermined to an appropriate distance in all directions utilizing iris scissors.  The opposing advancement arms were then advanced into place in opposite direction and anchored with interrupted buried subcutaneous sutures. W Plasty Text: .\\n\\nGiven the location of the defect, shape of the defect and the proximity to free margins, a W-PLASTY was deemed most appropriate for repair.  Using a sterile surgical marker, the appropriate transposition arms of the W-plasty were drawn incorporating the defect and placing the expected incisions within the relaxed skin tension lines where possible.    The area thus outlined was incised deep to adipose tissue with a #15 scalpel blade.  The skin margins were undermined to an appropriate distance in all directions utilizing iris scissors.  The opposing transposition arms were then transposed into place in opposite direction and anchored with interrupted buried subcutaneous sutures. Z Plasty Text: .\\n\\nGiven the location of the defect, shape of the defect and the proximity to free margins, a Z-PLASTY was deemed most appropriate for repair.  Using a sterile surgical marker, the appropriate transposition arms of the Z-plasty were drawn incorporating the defect and placing the expected incisions within the relaxed skin tension lines where possible.    The area thus outlined was incised deep to adipose tissue with a #15 scalpel blade.  The skin margins were undermined to an appropriate distance in all directions.  The opposing transposition arms were then transposed into place in opposite direction and anchored with interrupted buried subcutaneous sutures. Zygomaticofacial Flap Text: .\\n\\nGiven the location of the defect, shape of the defect and the proximity to free margins, a ZYGOMATICOFACIAL FLAP was deemed most appropriate for repair.  Using a sterile surgical marker, the appropriate flap was drawn incorporating the defect and placing the expected incisions within the relaxed skin tension lines where possible. The area thus outlined was incised deep to adipose tissue with a #15 scalpel blade with preservation of a vascular pedicle.  The skin margins were undermined to an appropriate distance in all directions.  The flap was then placed into the defect and anchored with interrupted buried subcutaneous sutures. Cheek Interpolation Flap Text: .\\n\\nA decision was made to reconstruct the defect utilizing an interpolation axial flap and a staged reconstruction.  A telfa template was made of the defect.  This telfa template was then used to outline the CHEEK INTERPOLATION FLAP.  The donor area for the pedicle flap was then injected with anesthesia.  The flap was excised through the skin and subcutaneous tissue down to the layer of the underlying musculature.  The interpolation flap was carefully excised within this deep plane to maintain its blood supply.  The edges of the donor site were undermined.   The donor site was closed in a primary fashion.  The pedicle was then rotated into position and sutured.  Once the tube was sutured into place, adequate blood supply was confirmed with blanching and refill.  The pedicle was then wrapped with xeroform gauze and dressed appropriately with a telfa and gauze bandage to ensure continued blood supply and protect the attached pedicle. Cheek-To-Nose Interpolation Flap Text: .\\n\\nA decision was made to reconstruct the defect utilizing an interpolation axial flap and a staged reconstruction.  A telfa template was made of the defect.  This telfa template was then used to outline the CHEEK-TO-NOSE INTERPOLATION FLAP.  The donor area for the pedicle flap was then injected with anesthesia.  The flap was excised through the skin and subcutaneous tissue down to the layer of the underlying musculature.  The interpolation flap was carefully excised within this deep plane to maintain its blood supply.  The edges of the donor site were undermined.   The donor site was closed in a primary fashion.  The pedicle was then rotated into position and sutured.  Once the tube was sutured into place, adequate blood supply was confirmed with blanching and refill.  The pedicle was then wrapped with xeroform gauze and dressed appropriately with a telfa and gauze bandage to ensure continued blood supply and protect the attached pedicle. Interpolation Flap Text: .\\n\\nA decision was made to reconstruct the defect utilizing an INTERPOLATION AXIAL FLAP and a STAGED RECONSTRUCTION.  A telfa template was made of the defect.  This telfa template was then used to outline the interpolation flap.  The donor area for the pedicle flap was then injected with anesthesia.  The flap was excised through the skin and subcutaneous tissue down to the layer of the underlying musculature.  The interpolation flap was carefully excised within this deep plane to maintain its blood supply.  The edges of the donor site were undermined.   The donor site was closed in a primary fashion.  The pedicle was then rotated into position and sutured.  Once the tube was sutured into place, adequate blood supply was confirmed with blanching and refill.  The pedicle was then wrapped with xeroform gauze and dressed appropriately with a telfa and gauze bandage to ensure continued blood supply and protect the attached pedicle. Melolabial Interpolation Flap Text: .\\n\\nA decision was made to reconstruct the defect utilizing an interpolation axial flap and a staged reconstruction.  A telfa template was made of the defect.  This telfa template was then used to outline the MELOLABIAL INTERPOLATION FLAP for a STAGED RECONSTRUCTION.  The donor area for the pedicle flap was then injected with anesthesia.  The flap was excised through the skin and subcutaneous tissue down to the layer of the underlying musculature.  The pedicle flap was carefully excised within this deep plane to maintain its blood supply.  The edges of the donor site were undermined.   The donor site was closed in a primary fashion.  The pedicle was then rotated into position and sutured.  Once the tube was sutured into place, adequate blood supply was confirmed with blanching and refill.  The pedicle was then wrapped with xeroform gauze and dressed appropriately with a telfa and gauze bandage to ensure continued blood supply and protect the attached pedicle. Mastoid Interpolation Flap Text: .\\n\\nA decision was made to reconstruct the defect utilizing an interpolation axial flap and a staged reconstruction.  A telfa template was made of the defect.  This telfa template was then used to outline the MASTOID INTERPOLATION FLAP for a STAGED RECONSTRUCTION.  The donor area for the pedicle flap was then injected with anesthesia.  The flap was excised through the skin and subcutaneous tissue down to the layer of the underlying musculature.  The pedicle flap was carefully excised within this deep plane to maintain its blood supply.  The edges of the donor site were undermined.   The donor site was closed in a primary fashion.  The pedicle was then rotated into position and sutured.  Once the tube was sutured into place, adequate blood supply was confirmed with blanching and refill.  The pedicle was then wrapped with xeroform gauze and dressed appropriately with a telfa and gauze bandage to ensure continued blood supply and protect the attached pedicle. Posterior Auricular Interpolation Flap Text: .\\n\\nA decision was made to reconstruct the defect utilizing an interpolation axial flap and a staged reconstruction.  A telfa template was made of the defect.  This telfa template was then used to outline the POSTERIOR AURICULAR INTERPOLATION FLAP for a STAGED RECONSTRUCTION.  The donor area for the pedicle flap was then injected with anesthesia.  The flap was excised through the skin and subcutaneous tissue down to the layer of the underlying musculature.  The pedicle flap was carefully excised within this deep plane to maintain its blood supply.  The edges of the donor site were undermined.   The donor site was closed in a primary fashion.  The pedicle was then rotated into position and sutured.  Once the tube was sutured into place, adequate blood supply was confirmed with blanching and refill.  The pedicle was then wrapped with xeroform gauze and dressed appropriately with a telfa and gauze bandage to ensure continued blood supply and protect the attached pedicle. Paramedian Forehead Flap Text: .\\n\\nA decision was made to reconstruct the defect utilizing an interpolation axial flap and a staged reconstruction.  A telfa template was made of the defect.  This telfa template was then used to outline the PARAMEDIAN FOREHEAD PEDICLE FLAP for a STAGED RECONSTRUCTION.  The donor area for the pedicle flap was then injected with anesthesia.  The flap was excised through the skin and subcutaneous tissue down to the layer of the underlying musculature.  The pedicle flap was carefully excised within this deep plane to maintain its blood supply.  The edges of the donor site were undermined.   The donor site was closed in a primary fashion.  The pedicle was then rotated into position and sutured.  Once the tube was sutured into place, adequate blood supply was confirmed with blanching and refill.  The pedicle was then wrapped with xeroform gauze and dressed appropriately with a telfa and gauze bandage to ensure continued blood supply and protect the attached pedicle. Cheiloplasty (Less Than 50%) Text: .\\n\\nA decision was made to reconstruct the defect with a CHELIOPLASTY COMPOSING LESS THAN 50% OF THE VERTICAL HEIGHT OF THE LIP.  The defect was undermined extensively.  Additional orbicularis oris muscle was excised with a 15 blade scalpel.  The defect was converted into a full thickness wedge, of less than 50% of the vertical height of the lip, to facilitate a better cosmetic result.  Small vessels were then tied off with 5-0 monocyrl. The orbicularis oris, superficial fascia, adipose and dermis were then reapproximated.  After the deeper layers were approximated the epidermis was reapproximated with particular care given to realign the vermilion border. Cheiloplasty (Complex) Text: .\\n\\nA decision was made to reconstruct the defect with a CHELIOPLASTY COMPRISING GREATER THAN 50% OF THE VERTICAL HEIGHT OF THE LIP. The defect was undermined extensively.  Additional orbicularis oris muscle was excised with a 15 blade scalpel.  The defect was converted into a full thickness wedge to facilitate a better cosmetic result.  Small vessels were then tied off with 5-0 monocyrl. The orbicularis oris, superficial fascia, adipose and dermis were then reapproximated.  After the deeper layers were approximated the epidermis was reapproximated with particular care given to realign the vermilion border. Ear Wedge Repair Text: .\\n\\nAn EAR WEDGE REPAIR was completed by carrying down an excision through the full thickness of the ear and cartilage with an inward facing Burow's triangle. The wound was then closed in a layered fashion. Full Thickness Lip Wedge Repair (Flap) Text: .\\n\\nGiven the location and depth of the defect along with the proximity to free margins, a FULL-THICKNESS LIP WEDGE REPAIR was deemed most appropriate.  Using a sterile surgical marker, the appropriate repair was drawn incorporating the defect and placing the expected incisions perpendicular to the vermilion border.  The vermilion border was also meticulously outlined to ensure appropriate reapproximation during the repair.  The area thus outlined was incised through and through with a #15 scalpel blade.  The muscularis and dermis were reaproximated with deep sutures following hemostasis. Care was taken to realign the vermilion border before proceeding with the superficial closure.  Once the vermilion was realigned the superficial and mucosal closure was finished. Ftsg Text: .\\n\\nGiven the location of the defect, shape of the defect and tension on the surgical defect, a FULL-THICKNESS SKIN GRAFT was deemed most appropriate.  Using a sterile surgical marker, the primary defect shape was transferred to the donor site. The area thus outlined was incised deep to adipose tissue with a #15 scalpel blade.  The harvested graft was then trimmed of adipose tissue until only dermis and epidermis was left.  The skin margins of the secondary defect were undermined to an appropriate distance in all directions utilizing iris scissors.  The secondary defect was closed with interrupted buried subcutaneous sutures.  The skin edges were then re-apposed with running  sutures.  The skin graft was then placed in the primary defect and oriented appropriately. Split-Thickness Skin Graft Text: .\\n\\nGiven the location of the defect, shape of the defect and the tension on the surgical defect, a SPLIT-THICKNESS SKIN GRAFT was deemed most appropriate.  Using a sterile surgical marker, the primary defect shape was transferred to the donor site. The split thickness graft was then harvested.  The skin graft was then placed in the primary defect and oriented appropriately. Burow's Graft Text: .\\n\\nGiven the location of the defect, shape of the defect, tension on the surgical defect, and the presence of a standing cone deformity, a BUROW'S SKIN GRAFT was deemed most appropriate. The standing cone was removed and this tissue was then trimmed to the shape of the primary defect. The adipose tissue was also removed until only dermis and epidermis were left.  The skin margins of the secondary defect were undermined to an appropriate distance in all directions.  The secondary defect was closed with interrupted buried subcutaneous sutures, which was not included in the description of other repairs.  The skin edges were then re-apposed with running  sutures.  The skin graft was then placed in the primary defect and oriented appropriately. Cartilage Graft Text: .\\n\\nGiven the location of the defect, shape of the defect, the fact the defect involved a full thickness cartilage defect, a CARTILAGE GRAFT was deemed most appropriate.  An appropriate donor site was identified, cleansed, and anesthetized. The cartilage graft was then harvested and transferred to the recipient site, oriented appropriately and then sutured into place.  The secondary defect was then repaired using a primary closure. Composite Graft Text: .\\n\\nGiven the location of the defect, shape of the defect, the proximity to free margins and the fact the defect was full thickness, a COMPOSITE GRAFT was deemed most appropriate.  The defect was outline and then transferred to the donor site.  A full thickness graft was then excised from the donor site. The graft was then placed in the primary defect, oriented appropriately and then sutured into place.  The secondary defect was then repaired using a primary closure. Epidermal Autograft Text: .\\n\\nGiven the location of the defect, shape of the defect and the proximity to free margins, an EPIDERMAL AUTOGRAFT was deemed most appropriate.  Using a sterile surgical marker, the primary defect shape was transferred to the donor site. The epidermal graft was then harvested.  The skin graft was then placed in the primary defect and oriented appropriately. Dermal Autograft Text: .\\n\\nGiven the location of the defect, shape of the defect and the proximity to free margins, a DERMAL AUTOGRAFT was deemed most appropriate.  Using a sterile surgical marker, the primary defect shape was transferred to the donor site. The area thus outlined was incised deep to adipose tissue with a #15 scalpel blade.  The harvested graft was then trimmed of adipose and epidermal tissue until only dermis was left.  The skin graft was then placed in the primary defect and oriented appropriately. Skin Substitute Text: .\\n\\nGiven the location of the defect, shape of the defect, depth of the defect, and risks of a chronic wound, prolonged wound care, and infection, a SKIN SUBSTITUTE GRAFT was deemed most appropriate.  The graft material was placed into a wound bed, free of clinical infection, and folded so no product was wasted. A pressure bandage was applied and wound care reviewed. Skin Substitute Paste Text: .\\n\\nGiven the location of the defect, shape of the defect and the proximity to free margins, a SKIN SUBSTITUTE MICRONIZED GRAFT via a PASTE was deemed most appropriate.  The entire vial contents were admixed with 0.5 cc's of sterile saline, formed into a paste and then evenly spread over the entire wound bed. Skin Substitute Injection Text: .\\n\\nGiven the location of the defect, shape of the defect and the proximity to free margins, a SKIN SUBSTITUTE MICRONIZED GRAFT via INJECTION was deemed most appropriate.  The entire vial contents were admixed with 3.0ccs of sterile saline and then injected subcutaneously throughout the entire wound bed. Tissue Cultured Epidermal Autograft Text: .\\n\\nGiven the location of the defect, shape of the defect and the proximity to free margins, a TISSUE CULTURED EPIDERMAL AUTOGRAFT was deemed most appropriate.  The graft was then trimmed to fit the size of the defect.  The graft was then placed in the primary defect and oriented appropriately. Xenograft Text: .\\n\\nGiven the location of the defect, shape of the defect and the proximity to free margins, a XENOGRAFT was deemed most appropriate.  The graft was then trimmed to fit the size of the defect.  The graft was then placed in the primary defect and oriented appropriately. Purse String (Simple) Text: .\\n\\nGiven the location of the defect and the characteristics of the surrounding skin, a PURSE STRING CLOSURE was deemed most appropriate.  Undermining was performed circumferentially around the surgical defect.  A purse string suture was then placed and tightened. Purse String (Intermediate) Text: .\\n\\nGiven the location of the defect and the characteristics of the surrounding skin, a MODIFIED PURSE STRING INTERMEDIATE REPAIR was deemed most appropriate.  Undermining was performed circumferentially around the surgical defect.  A purse string suture was then placed and tightened. Redundant tissue cones were removed. Partial Purse String (Simple) Text: .\\n\\nGiven the location of the defect and the characteristics of the surrounding skin, a simple PURSE STRING CLOSURE was deemed most appropriate.  Undermining was performed circumferentially around the surgical defect.  A purse string suture was then placed and tightened. Wound tension only allowed a partial closure of the circular defect. Partial Purse String (Intermediate) Text: .\\n\\nGiven the location of the defect and the characteristics of the surrounding skin, a MODIFIED PURSE STRING INTERMEDIATE CLOSURE was deemed most appropriate for a PARTIAL CLOSURE.  Undermining was performed circumferentially around the surgical defect.  A purse string suture was then placed and tightened. Wound tension only allowed a partial closure of the circular defect. Redundant tissue cones were removed. Localized Dermabrasion Text: .\\n\\nThe patient was draped in routine manner.  Localized DERMABRASION using sterile 180 grit dermabrading paper was performed in routine manner to papillary dermis. This spot dermabrasion is being performed to complete skin cancer reconstruction. It also will eliminate the other sun damaged precancerous cells that are known to be part of the regional effect of a lifetime's worth of sun exposure. This localized dermabrasion is therapeutic and should not be considered cosmetic in any regard. Tarsorrhaphy Text: .\\n\\Brittny addition, a TARSORRHAPHY was performed using a FROST SUTURE suspending the lateral portion of the lower eyelid to the lateral portion of the eyebrow, partially closing the lateral portion of the eye, to reduce the risk of ectropion resulting from removal of the tumor and the tension from reconstruction. Complex Repair And Flap Additional Text (Will Appearing After The Standard Complex Repair Text): .\\n\\nOf note, the COMPLEX REPAIR WAS INSUFFICIENT to completely close the primary defect alone. The remaining additional defect was repaired with the flap mentioned below. Complex Repair And Graft Additional Text (Will Appearing After The Standard Complex Repair Text): .\\n\\nOf note, the COMPLEX REPAIR WAS INSUFFICIENT to completely close the primary defect alone. The remaining additional defect was repaired with the graft mentioned below. Unique Flap 1 Name: Double Opposing Z-plasty Unique Flap 1 Text: Given the location of the defect, shape of the defect and anatomical location on an area of high tension with inelastic tissue, a DOUBLE OPPOSING Z-PLASTY was deemed most appropriate.  Using a sterile surgical marker, the appropriate transposition flaps were drawn incorporating the defect. The area thus outlined was incised deep to adipose tissue with a #15 scalpel blade.  The skin margins were undermined to an appropriate distance in all directions.  Hemostasis was achieved with electrocautery.  The flaps were then transposed into place, diametrically opposing the other, and anchored 4-0 Nylon sutures.\\n\\nPrior to and after performing Mohs and this surgical repair, I discussed the higher risk of dehiscence for closures on the leg. I explained in length the importance of keeping the area wrapped with compression, minimizing exertional activities (e.g. exercise walking and using the joint when going up stairs), elevating when possible to decrease tissue swelling, and even recommended purchasing a brace or wrapping the knee/ankle to minimize the risk of dehiscence from movement-induced tension if the surgical site was near the adjacent joint. However, the patient was instructed to be mobile to avoid development of a deep venous thrombosis from prolonged immobility. Patient was made aware the foot may swell below the compression wrap. Prior to and after performing the flap, I discussed the importance of these precautionary steps and the duration of numerous months caring for an open ulceration if the wound was to dehisce or heal by secondary intention at this anatomic site. Cheek Interpolation Flap Division And Inset Text: .\\n\\nDIVISION AND INSET OF THE CHEEK INTERPOLATION FLAP was performed to achieve optimal aesthetic result, restore normal anatomic appearance and avoid distortion of normal anatomy, expedite and facilitate wound healing, achieve optimal functional result and because linear closure either not possible or would produce suboptimal result. The patient was prepped and draped in the usual manner. The pedicle was infiltrated with local anesthesia. The pedicle was sectioned with a #15 blade. The pedicle was de-bulked and trimmed to match the shape of the defect. Hemostasis was achieved. The flap donor site and free margin of the flap were secured with deep buried sutures and the wound edges were re-approximated. Cheek To Nose Interpolation Flap Division And Inset Text: .\\n\\nDIVISION AND INSET OF THE CHEEK-TO-NOSE INTERPOLATION FLAP was performed to achieve optimal aesthetic result, restore normal anatomic appearance and avoid distortion of normal anatomy, expedite and facilitate wound healing, achieve optimal functional result and because linear closure either not possible or would produce suboptimal result. The patient was prepped and draped in the usual manner. The pedicle was infiltrated with local anesthesia. The pedicle was sectioned with a #15 blade. The pedicle was de-bulked and trimmed to match the shape of the defect. Hemostasis was achieved. The flap donor site and free margin of the flap were secured with deep buried sutures and the wound edges were re-approximated. Melolabial Interpolation Flap Division And Inset Text: .\\n\\nDIVISION AND INSET OF THE MELOLABIAL INTERPOLATION FLAP was performed to achieve optimal aesthetic result, restore normal anatomic appearance and avoid distortion of normal anatomy, expedite and facilitate wound healing, achieve optimal functional result and because linear closure either not possible or would produce suboptimal result. The patient was prepped and draped in the usual manner. The pedicle was infiltrated with local anesthesia. The pedicle was sectioned with a #15 blade. The pedicle was de-bulked and trimmed to match the shape of the defect. Hemostasis was achieved. The flap donor site and free margin of the flap were secured with deep buried sutures and the wound edges were re-approximated. Mastoid Interpolation Flap Division And Inset Text: .\\n\\nDIVISION AND INSET OF THE MASTOID INTERPOLATION FLAP was performed to achieve optimal aesthetic result, restore normal anatomic appearance and avoid distortion of normal anatomy, expedite and facilitate wound healing, achieve optimal functional result and because linear closure either not possible or would produce suboptimal result. The patient was prepped and draped in the usual manner. The pedicle was infiltrated with local anesthesia. The pedicle was sectioned with a #15 blade. The pedicle was de-bulked and trimmed to match the shape of the defect. Hemostasis was achieved. The flap donor site and free margin of the flap were secured with deep buried sutures and the wound edges were re-approximated. Paramedian Forehead Flap Division And Inset Text: .\\n\\nDIVISION AND INSET OF THE PARAMEDIAN FOREHEAD INTERPOLATION FLAP was performed to achieve optimal aesthetic result, restore normal anatomic appearance and avoid distortion of normal anatomy, expedite and facilitate wound healing, achieve optimal functional result and because linear closure either not possible or would produce suboptimal result. The patient was prepped and draped in the usual manner. The pedicle was infiltrated with local anesthesia. The pedicle was sectioned with a #15 blade. The pedicle was de-bulked and trimmed to match the shape of the defect. Hemostasis was achieved. The flap donor site and free margin of the flap were secured with deep buried sutures and the wound edges were re-approximated. Posterior Auricular Interpolation Flap Division And Inset Text: .\\n\\nDIVISION AND INSET OF THE POSTERIOR AURICULAR INTERPOLATION FLAP was performed to achieve optimal aesthetic result, restore normal anatomic appearance and avoid distortion of normal anatomy, expedite and facilitate wound healing, achieve optimal functional result and because linear closure either not possible or would produce suboptimal result. The patient was prepped and draped in the usual manner. The pedicle was infiltrated with local anesthesia. The pedicle was sectioned with a #15 blade. The pedicle was de-bulked and trimmed to match the shape of the defect. Hemostasis was achieved. The flap donor site and free margin of the flap were secured with deep buried sutures and the wound edges were re-approximated. Manual Repair Warning Statement: We plan on removing the manually selected variable below in favor of our much easier automatic structured text blocks found in the previous tab. We decided to do this to help make the flow better and give you the full power of structured data. Manual selection is never going to be ideal in our platform and I would encourage you to avoid using manual selection from this point on, especially since I will be sunsetting this feature. It is important that you do one of two things with the customized text below. First, you can save all of the text in a word file so you can have it for future reference. Second, transfer the text to the appropriate area in the Library tab. Lastly, if there is a flap or graft type which we do not have you need to let us know right away so I can add it in before the variable is hidden. No need to panic, we plan to give you roughly 6 months to make the change. Consent: The rationale for the repair was explained to the patient and consent was obtained. The risks, benefits and alternatives to therapy were discussed in detail. Specifically, the risks of infection, scarring, bleeding, prolonged wound healing, incomplete removal, allergy to anesthesia, nerve injury and recurrence were addressed. Prior to the procedure, the treatment site was clearly identified and confirmed by the patient. All components of Universal Protocol/PAUSE Rule completed. Post-Care Instructions: I reviewed with the patient in detail post-care instructions and a post-operative wound care handout was provided. Patient is not to engage in any heavy lifting, exercise, or swimming for the next 10-14  days. Should the patient develop any fevers, chills, bleeding, severe pain patient will contact the office immediately. Opioid Counseling: Patient was counseled on the increased risks of opiate medications, including dependency/addiction, fall risk, urinary retention, impairment (recommend not operating heavy machinery while on this medication), and limiting consumption as much as possible by using over-the-counter pain medications. Instructions to limit maximum acetaminophen (Tylenol) consumption to less than 4,000 mg per 24 hours. Pain Refusal Text: I offered to prescribe pain medication but the patient refused to take this medication. Where Do You Want The Question To Include Opioid Counseling Located?: Case Summary Tab Information: Selecting Yes will display possible errors in your note based on the variables you have selected. This validation is only offered as a suggestion for you. PLEASE NOTE THAT THE VALIDATION TEXT WILL BE REMOVED WHEN YOU FINALIZE YOUR NOTE. IF YOU WANT TO FAX A PRELIMINARY NOTE YOU WILL NEED TO TOGGLE THIS TO 'NO' IF YOU DO NOT WANT IT IN YOUR FAXED NOTE.

## 2022-06-06 NOTE — DISCHARGE NOTE PROVIDER - DISCHARGE DATE
Subjective   Patient ID: Dustin is a 9 year old male who is accompanied by:mother     Chief Complaint   Patient presents with   • Epistaxis     waking up with bloody nose 3-4 times in a month     HPI   Nosebleeds x 2-3 months   A few times per month, bleeds for a few minutes at a time, stops with paper up his nose   Admits to nose picking sometimes   Air conditioning is on during the day but not at night   Has a humidifier in the air conditioner   Denies gum bleeding, easy bruising, family history of bleeding disorders   Interventions - none     Review of Systems   HENT: Positive for nosebleeds.        Patient's medications, allergies, past medical, surgical, social and family histories were reviewed and updated as appropriate.    Objective   Vitals:/73   Pulse 68   Temp 97.8 °F (36.6 °C)   Wt 27.6 kg (60 lb 12.8 oz)   Physical Exam  Constitutional:       General: He is active.   HENT:      Head: Normocephalic and atraumatic.      Nose: No nasal deformity, septal deviation, mucosal edema, congestion or rhinorrhea.      Comments: One small open anterior vessel in both nares, no active bleeding     Mouth/Throat:      Mouth: Mucous membranes are dry.      Pharynx: No oropharyngeal exudate or posterior oropharyngeal erythema.   Cardiovascular:      Rate and Rhythm: Normal rate and regular rhythm.      Pulses: Normal pulses.      Heart sounds: Normal heart sounds.   Pulmonary:      Effort: Pulmonary effort is normal.      Breath sounds: Normal breath sounds.   Neurological:      Mental Status: He is alert.         Assessment   Problem List Items Addressed This Visit     None      Visit Diagnoses     Epistaxis    -  Primary        Discussed epistaxis prevention with humidifier and Vaseline application. Also discussed management for active nosebleeds and when to be concerns. Instructions provided.     Instructed to call if problem worsens or does not improve within the next 24 hours otherwise follow-up prn   05-Mar-2019

## 2022-06-14 NOTE — CONSULT NOTE ADULT - SUBJECTIVE AND OBJECTIVE BOX
Patient is a 39y Male whom presented to the hospital with   PROGRESSIVE   SOB  HAS MED  HX  AS  BELOW    ACC TO  THE PT  OVER LAST  4  DAYS  HAS  BEEN  SOB,  PROGRESSIVELY  GETTING   WORSE   UNABLE  TO  LIE  ON  BED,    B/C OF  THIS  PROBLEM   SO  CAME TO  ER   ACC TO  PT  HAD  HIS  REGULAR  HD  ON  FRIDAY,   AND  ADEQUATE  FLUID  WS REMOVED    PAST MEDICAL & SURGICAL HISTORY:  Migraine  HTN (hypertension)  DM (diabetes mellitus)  Bipolar disorder  Schizophrenia  ESRD on dialysis  Morbid obesity with BMI of 40.0-44.9, adult  H/O hernia repair    aspirin (Short breath)  aspirin (Swelling)  penicillin (Short breath)  penicillins (Swelling)  shellfish (Unknown)    Home Medications Reviewed  Hospital Medications:   MEDICATIONS  (STANDING):    SOCIAL HISTORY:  Denies ETOh,Smoking,   FAMILY HISTORY:  Family history of COPD (chronic obstructive pulmonary disease)  Family history of diabetes mellitus    REVIEW OF SYSTEMS:  HAS NO FEVER/CHILLS  GETS  SOB  EASILY  ON  EXERTION  AND  ON LYING  DOWN,    NO  CH PAIN  OR PALPITATIONS   APPETITE IS  NOT  GOOD,  HAD  VOMITTING ONCE  YESTERDAY   HAS L  HEEL  PAIN  LEG  SWELLING  IS ALSO  THERE    VITALS:  T(F): 98.1 (07-07-19 @ 13:03), Max: 98.1 (07-07-19 @ 13:03)  HR: 94 (07-07-19 @ 13:03)  BP: 156/97 (07-07-19 @ 13:03)  RR: 18 (07-07-19 @ 13:03)  SpO2: 98% (07-07-19 @ 13:03)  Wt(kg): --    Height (cm): 187.96 (07-07 @ 13:03)  Weight (kg): 144.2 (07-07 @ 13:03)  BMI (kg/m2): 40.8 (07-07 @ 13:03)  BSA (m2): 2.65 (07-07 @ 13:03)  PHYSICAL EXAM:  Constitutional: NAD  HEENT: CONJ   PINK  Neck: No JVD  Respiratory:HAS  FAIR  AIR  ENTERY  BILAT,  HAS   CRACKLES  AT  BASES   BILAT  POST  Cardiovascular: S1, S2, RRR  Gastrointestinal: BS+, soft, NT/ND, OBESE  Extremities: peripheral edema  2 + P  EDEMA  Neurological: A/O x 3, no focal deficits  : No hernandez.   Vascular Access: AVF  WORKING  WELL L  UPPER ARM    LABS:        Creatinine Trend:     Urine Studies:      RADIOLOGY & ADDITIONAL STUDIES:
Laura Lopez)

## 2022-06-17 NOTE — PATIENT PROFILE ADULT - VISION (WITH CORRECTIVE LENSES IF THE PATIENT USUALLY WEARS THEM):
Normal vision: sees adequately in most situations; can see medication labels, newsprint See anesthesia documentation.

## 2022-06-20 NOTE — CONSULT NOTE ADULT - SUBJECTIVE AND OBJECTIVE BOX
Chief Complaint   Patient presents with   â¢ Follow-up     Allergic contact dermatitis, unspecified trigger   â¢ Office Visit     Final patch read     SUBJECTIVE:  Chet Campbell returns for second patch test reading and discussion of results. She has perioral lip itching, swelling and dryness. Current Outpatient Medications   Medication Sig Dispense Refill   â¢ cetirizine (ZyrTEC) 10 MG tablet Take 10 mg by mouth daily. â¢ Loratadine 10 MG Cap Take 10 mg by mouth daily. â¢ Multiple Vitamin (MULTIVITAMIN PO) Take 1 tablet by mouth daily. â¢ medroxyPROGESTERone (Provera) 10 MG tablet Take one tablet by mouth for 10 days every 3 months, to induce a period 10 tablet 4   â¢ hydroCORTisone (CORTIZONE) 2.5 % lotion Apply once a day to scaly rash of lips as needed for scaling. 59 mL 2   â¢ albuterol 108 (90 Base) MCG/ACT inhaler 2 puffs 15-20 minutes before activity. 1 each 0   â¢ VITAMIN D PO Take 2,000 Int'l Units by mouth daily. â¢ IRON PO Take 1 tablet by mouth every other day. â¢ Spacer/Aero-Holding Chambers (AEROCHAMBER) Use with MDI 1 Device 0     No current facility-administered medications for this visit. Allergies as of 06/24/2022 - Reviewed 06/24/2022   Allergen Reaction Noted   â¢ Linalool Dermatitis 06/24/2022       Interval medical history is as above. She has no other significant problems or complaints. Environmental Changes: None. PHYSICAL EXAMINATION:   GENERAL: She is in no acute distress. VITAL SIGNS:   Visit Vitals  LMP 06/19/2022 (Exact Date)      NEUROLOGIC: The patient is alert and oriented x3, appropriate for age. Normal affect. Data:    Patch Test     Read by: Ranjan Ayala MD            Read/Apply: Read 2       NAC-80    # Antigen Reaction Comment   1. Benzocaine Negative    2. 2-Mercaptobenzothiaxole (MBT) Negative    3. Colophonium / (Colophony) Negative    4. p-Phenylenediamine (PPD) Negative    5. Imidazolidinyl urea (Germall 115) Negative    6.  Cinnamal / (Cinnamic aldehyde) Negative    7. Amerchol L 101 Negative    8. Carba mix Negative    9. Neomycin sulfate Negative    10. Thiuram mix Negative       11. Clobetasol-17-propionate Negative    12. Ethylenediamine dihydrochloride Negative    13. Epoxy resin Bisphenol A Negative    14. Quaternium-15 (Dowicil 200) / (1-(3-Chloroallyl)-3,5,9-gqvptq-6-azoniaadamantane chloride) Negative    15. 9-nove-Rzzqonngcubnibrlhpfsjce resin Negative    16. Mercapto mix Negative    17. N,N-Diphenylguanidine Negative    18. Potassium dichromate Negative    19. Myroxylon pereirae resin / ([address] [place] [address]) Negative    20. Nickelsulfate hexahydrate Negative       21. Diazolidinylurea (Germall II) Negative    22. Tocopherol/ (DL alpha tocopherol) Negative    23. Bacitracin Negative    24. Mixed dialkyl thiourea Negative    25. Disperse Potter 3 Negative    26. Paraben Mix Negative    27. Methyldibromo glutaronitrile (MDBGN) Negative    28. Fragrance mix Negative    29. Glutaral / (Glutaraldehyde) Negative    30. 2-Bromo-2-nitropropane-l,3-diol (Bronopol) Negative       31. Sesquiterpene lactone mix Negative    32. Thimerosal (Merthiolate) Negative    33. Propolis Negative    34. Benzophenone-3 / (2-Hydroxy-4-methoxybenzophenone) Negative    35. Chloroxylenol (PCMX) / (4-Chloro-3,5-xylenol (PCMX)) Negative    36. Ethyleneurea, melamine formaldehyde mix Negative    37. 2-tert-Butyl-4-methoxyphenol (BHA) Negative    38. Gold(I)sodium thiosulfate dihydrate Negative    39. Ethyl acrylate Negative    40. Glyceryl monothioglycolate (GMTG) Negative       41. Toluenesulfonamide formaldehyde resin Negative    42. Methyl methacrylate Negative    43. Cobalt(II) chloride hexahydrate Negative    44. Tixocortol-21-pivalate Negative    45. Budesonide Negative    46. Cocamide DELFINA / (Coconut diethanolamide) Negative    47. Triethanolamine Negative    48. Textile dye mix Negative    49. Tea Tree Oil Negative    50. Fragrance Mix II Negative       51.  Disperse Yellow 3 Negative    52. Benzyl salicylate Negative    53. Decyl Glucoside Negative    54. Methylisothiazolinone Negative    55. HEMA (2-Hydroxyethyl methacrylate) Negative    56. DMDM Hydantoin Negative    57. Cananga odorata oil / (Ylang-Ylang oil) Negative    58. Benzyl alcohol Negative    59. Isopropyl myristate Negative    60. Hydroperoxides of Limonene Negative       61. Desoximetasone Negative    62. Polysorbate 80 / (Polyoxyethylene sorbitan monooleate(Tween 80)) Negative    63. Iodopropynyl butyl carbamate Negative    64. 2-n-Octyl-4-isothiazolin-3-one Negative    65. Disperse Blue 106/124 Mix Negative    66. Compositae Mix II Negative    67. Lidocaine Negative    68. Fusidic acid sodium salt Negative    69. Dibucaine hydrochloride Negative    70. Benzoylperoxide Negative       71. Isoamyl-p-methoxycinnamate Negative    72. Hydroxyisohexyl 3-Cyclohexene Carboxaldehyde (Lyral) Negative    73. Ethylhexyl Salicylate Negative    74. Hydroperoxides of Linalool Weak positive    75. Amidoamine Negative    76. Cocamidopropylbetaine Negative    77. Formaldehyde Negative    78. Methylisothiazolinone + Methylchloroisothiazolinone / (Cl+-Me-isothiazolinone (Kathon CG 200ppm)) Negative    79. Propylene glycol Negative    80. Oleamidopropyl dimethylamine Negative         I interpreted results as above. Based on today's evaluation, I made the following assessment and recommendations:    Chet Campbell was seen today for follow-up and office visit. Diagnoses and all orders for this visit:    Allergic contact dermatitis, unspecified trigger      I reviewed the results on the AVS, the [address] [place] [address] handouts, and explained where the patient would find these chemicals and showed the patient how to use the ACDS safe product list.     The only positive was linalool. The patient's body wash does contain Linalool. I recommended that she stop using that and go through all of her products.   Stop using the vasline lip product (contains [facility] : Progress Note :: [phone], (office [phone]),   Dr La / Dr Hui / Dr Barber / Dr Villalobos / Dr Hang CASTELLANO / Dr Mehta / Dr Sanchez / Dr Alber dumont  _____________________________________________________________________________________________  Patient is a 39y Male whom presented to the hospital with bilateral LE pain and . Has ESRD on HD WMF. chronic hypervolemia and chronic bilateral leg LE pains. Also with some shortness of breath, felt like had an asthma exercebation. renal consulted for ESDRD. He is s/p hemodialysis. Still with bilateral leg pains.    PAST MEDICAL & SURGICAL HISTORY:  COPD (chronic obstructive pulmonary disease)  Migraine  HTN (hypertension)  DM (diabetes mellitus)  Bipolar disorder  Schizophrenia  ESRD on dialysis  Morbid obesity with BMI of 40.0-44.9, adult  H/O hernia repair    aspirin (Short breath)  aspirin (Swelling)  fish (Unknown)  penicillin (Short breath)  penicillins (Swelling)  shellfish (Unknown)    Home Medications Reviewed  Hospital Medications:   MEDICATIONS  (STANDING):  ascorbic acid 500 milliGRAM(s) Oral daily  carvedilol 6.25 milliGRAM(s) Oral every 12 hours  clonazePAM  Tablet 0.5 milliGRAM(s) Oral every 12 hours  clopidogrel Tablet 75 milliGRAM(s) Oral daily  epoetin cyndie Injectable 4000 Unit(s) IV Push <User Schedule>  folic acid 1 milliGRAM(s) Oral daily  furosemide    Tablet 80 milliGRAM(s) Oral daily  gabapentin 300 milliGRAM(s) Oral at bedtime  heparin  Injectable 5000 Unit(s) SubCutaneous every 12 hours  hydrALAZINE 50 milliGRAM(s) Oral three times a day  insulin lispro (HumaLOG) corrective regimen sliding scale   SubCutaneous three times a day before meals  isosorbide   mononitrate ER Tablet (IMDUR) 30 milliGRAM(s) Oral daily  mirtazapine 7.5 milliGRAM(s) Oral at bedtime  pantoprazole    Tablet 40 milliGRAM(s) Oral before breakfast  PARoxetine 20 milliGRAM(s) Oral daily  risperiDONE   Tablet 1 milliGRAM(s) Oral every 12 hours  sevelamer carbonate 800 milliGRAM(s) Oral three times a day with meals  simvastatin 40 milliGRAM(s) Oral at bedtime    SOCIAL HISTORY:  Denies ETOh,Smoking,   FAMILY HISTORY:  Family history of COPD (chronic obstructive pulmonary disease)  Family history of diabetes mellitus        VITALS:  T(F): 97.9 (11-18-19 @ 09:45), Max: 98.5 (11-18-19 @ 06:32)  HR: 93 (11-18-19 @ 09:45)  BP: 168/91 (11-18-19 @ 09:45)  RR: 19 (11-18-19 @ 09:45)  SpO2: 100% (11-18-19 @ 09:45)  Wt(kg): --    Height (cm): 200.66 (11-17 @ 21:22)  Weight (kg): 203.7 (11-17 @ 21:22)  BMI (kg/m2): 50.6 (11-17 @ 21:22)  BSA (m2): 3.21 (11-17 @ 21:22)    PHYSICAL EXAM:  Constitutional: obese male in no acute distress.  HEENT: anicteric sclera, oropharynx clear.  Neck: No JVD  Respiratory: CTAB, no wheezes, rales or rhonchi  Cardiovascular: S1, S2, RRR  Gastrointestinal: BS+, soft, NT/ND  Extremities: bilateral leg tenderness, swollen and chronic stasis changes  Neurological: A/O x 3, no focal deficits  Vascular Access: AVF with thrill and bruit    LABS:  11-18    138  |  95<L>  |  76<H>  ----------------------------<  102<H>  4.7   |  32<H>  |  9.14<H>    Ca    9.6      18 Nov 2019 02:42    TPro  8.2  /  Alb  2.7<L>  /  TBili  0.5  /  DBili      /  AST  14  /  ALT  19  /  AlkPhos  237<H>  11-18    Creatinine Trend: 9.14 <--, 10.20 <--                        9.1    9.46  )-----------( 324      ( 18 Nov 2019 02:42 )             31.5     Urine Studies:      RADIOLOGY & ADDITIONAL STUDIES: multiple ingredients) and use only petroleum jelly on the lips. I recommend that she use the arm and hammer tooth paste. No whitening. No dye or Fragrance. Avoid mint. I recommended that the patient try these suggestions over the next 2 months. I would like her to call me back with a follow-up either way. If she is continuing to have issues I could pretty special order some patch test allergen looking for specific issues such as peppermint allergy. The patient is concerned about food allergy. I explained that a true food he would give high it is an IgE mediated issues. Foods could be causing irritation if she already has dermatitis. Some contact allergens be in certain foods. At this point, however, I would not recommend specific diet other than avoiding mint, hard candy, chewing gum and cinnamon. PLAN: Return if symptoms worsen or fail to improve. Patient Instructions     Patch Test       Read by: Lulu Bashir MD            Read/Apply: Read 2           NAC-80      # Antigen Reaction Comment   1. Benzocaine Negative    2. 2-Mercaptobenzothiaxole (MBT) Negative    3. Colophonium / (Colophony) Negative    4. p-Phenylenediamine (PPD) Negative    5. Imidazolidinyl urea (Germall 115) Negative    6. Cinnamal / (Cinnamic aldehyde) Negative    7. Amerchol L 101 Negative    8. Carba mix Negative    9. Neomycin sulfate Negative    10. Thiuram mix Negative       11. Clobetasol-17-propionate Negative    12. Ethylenediamine dihydrochloride Negative    13. Epoxy resin Bisphenol A Negative    14. Quaternium-15 (Dowicil 200) / (1-(3-Chloroallyl)-3,5,8-hozrlf-6-azoniaadamantane chloride) Negative    15. 3-mdbk-Vnvexvrkefjehvmpsxuchme resin Negative    16. Mercapto mix Negative    17. N,N-Diphenylguanidine Negative    18. Potassium dichromate Negative    19. Myroxylon pereirae resin / ([address] [place] [address]) Negative    20. Nickelsulfate hexahydrate Negative       21. Diazolidinylurea (Germall II) Negative    22. Tocopherol/ (DL alpha tocopherol) Negative    23. Bacitracin Negative    24. Mixed dialkyl thiourea Negative    25. Disperse [place] 3 Negative    26. Paraben Mix Negative    27. Methyldibromo glutaronitrile (MDBGN) Negative    28. Fragrance mix Negative    29. Glutaral / (Glutaraldehyde) Negative    30. 2-Bromo-2-nitropropane-l,3-diol (Bronopol) Negative       31. Sesquiterpene lactone mix Negative    32. Thimerosal (Merthiolate) Negative    33. Propolis Negative    34. Benzophenone-3 / (2-Hydroxy-4-methoxybenzophenone) Negative    35. Chloroxylenol (PCMX) / (4-Chloro-3,5-xylenol (PCMX)) Negative    36. Ethyleneurea, melamine formaldehyde mix Negative    37. 2-tert-Butyl-4-methoxyphenol (BHA) Negative    38. Gold(I)sodium thiosulfate dihydrate Negative    39. Ethyl acrylate Negative    40. Glyceryl monothioglycolate (GMTG) Negative       41. Toluenesulfonamide formaldehyde resin Negative    42. Methyl methacrylate Negative    43. Cobalt(II) chloride hexahydrate Negative    44. Tixocortol-21-pivalate Negative    45. Budesonide Negative    46. Cocamide DELFINA / (Coconut diethanolamide) Negative    47. Triethanolamine Negative    48. Textile dye mix Negative    49. Tea Tree Oil Negative    50. Fragrance Mix II Negative       51. Disperse Yellow 3 Negative    52. Benzyl salicylate Negative    53. Decyl Glucoside Negative    54. Methylisothiazolinone Negative    55. HEMA (2-Hydroxyethyl methacrylate) Negative    56. DMDM Hydantoin Negative    57. Cananga odorata oil / (Ylang-Ylang oil) Negative    58. Benzyl alcohol Negative    59. Isopropyl myristate Negative    60. Hydroperoxides of Limonene Negative       61. Desoximetasone Negative    62. Polysorbate 80 / (Polyoxyethylene sorbitan monooleate(Tween 80)) Negative    63. Iodopropynyl butyl carbamate Negative    64. 2-n-Octyl-4-isothiazolin-3-one Negative    65. Disperse Blue 106/124 Mix Negative    66. Compositae Mix II Negative    67. Lidocaine Negative    68.  Fusidic acid sodium salt Negative    69. Dibucaine hydrochloride Negative    70. Benzoylperoxide Negative       71. Isoamyl-p-methoxycinnamate Negative    72. Hydroxyisohexyl 3-Cyclohexene Carboxaldehyde (Lyral) Negative    73. Ethylhexyl Salicylate Negative    74. Hydroperoxides of Linalool Weak positive    75. Amidoamine Negative    76. Cocamidopropylbetaine Negative    77. Formaldehyde Negative    78. Methylisothiazolinone + Methylchloroisothiazolinone / (Cl+-Me-isothiazolinone (Brandon CG 200ppm)) Negative    79. Propylene glycol Negative    80. Oleamidopropyl dimethylamine Negative                   Thank you for allowing me to participate in her care.

## 2022-06-22 NOTE — PROGRESS NOTE ADULT - PROBLEM/PLAN-4
Identified pt with two pt identifiers(name and ). Reviewed record in preparation for visit and have obtained necessary documentation. Chief Complaint   Patient presents with    Complete Physical        Vitals:    22 1407   BP: 126/60   Pulse: 71   Resp: 14   Temp: 97.3 °F (36.3 °C)   TempSrc: Temporal   SpO2: 97%   Weight: 235 lb (106.6 kg)   Height: 5' 7\" (1.702 m)   PainSc:   0 - No pain   LMP: 10/14/1987       Health Maintenance Due   Topic    Shingrix Vaccine Age 50> (1 of 2)    Pneumococcal 0-64 years (2 - PCV)    Foot Exam Q1        Coordination of Care Questionnaire:  :   1) Have you been to an emergency room, urgent care, or hospitalized since your last visit? If yes, where when, and reason for visit? No      2. Have seen or consulted any other health care provider since your last visit? If yes, where when, and reason for visit?   No
DISPLAY PLAN FREE TEXT

## 2022-07-06 NOTE — CONSULT NOTE ADULT - CARDIOVASCULAR SYMPTOMS
Pt and family agreeable, SNF referrals sent out, will follow. Locet completed and pasrr faxed. Sw met with Pt and spouse in room, used , informed of SNF. Both agreeable, will provide list of facilities interested and who Sw sent referrals to, Sw to follow.    orthopnea

## 2022-07-13 NOTE — ED PROVIDER NOTE - CROS ED GI ALL NEG
"I am unable to contact \"delta\" at [facility], spoke to patient, he DOES NOT want to make any changes to his medications, therefore the req from insurance company to have us change his medication to generic is denied.  " - - -

## 2022-08-08 NOTE — ED PROVIDER NOTE - NEUROLOGICAL, MLM
EKG completed handed to [address] [place] [address]  Alert and oriented, no focal deficits, no motor or sensory deficits.

## 2022-08-15 NOTE — PHYSICAL THERAPY INITIAL EVALUATION ADULT - ASSISTIVE DEVICE FOR TRANSFER: STAND/SIT, REHAB EVAL
This was a shared visit with the CHARY. I reviewed and verified the documentation and independently performed the documented: rolling walker

## 2022-08-19 NOTE — DIETITIAN INITIAL EVALUATION ADULT. - NS FNS CHANGE IN WEIGHT
Loss Male Completion Statement: After discussing his treatment course we decided to discontinue isotretinoin therapy at this time. He shouldn't donate blood for one month after the last dose. He should call with any new symptoms of depression.

## 2022-08-22 NOTE — PATIENT PROFILE ADULT - BILL PAYMENT
Refill Decision Note   Orlando Treadwell  is requesting a refill authorization.  Brief Assessment and Rationale for Refill:  Approve     Medication Therapy Plan:       Medication Reconciliation Completed: No   Comments:     No Care Gaps recommended.     Note composed:12:57 PM 08/22/2022               no

## 2022-08-24 NOTE — DISCHARGE NOTE NURSING/CASE MANAGEMENT/SOCIAL WORK - HAS THE PATIENT RECEIVED THE INFLUENZA VACCINE THIS SEASON?
----- Message from Shannon Flowers MD sent at 8/23/2022 10:43 PM CDT -----  Kidneys, potassium, sugar, calcium are normal  Minimally elevated one of the liver test - not a concern, will monitor it  Continue the same meds  Keep next visit as scheduled   yes...

## 2022-09-14 NOTE — ED ADULT NURSE NOTE - NS_SISCREENINGSR_GEN_ALL_ED
Price (Do Not Change): 0.00 Instructions: This plan will send the code FBSE to the PM system.  DO NOT or CHANGE the price. Detail Level: Simple Negative

## 2022-09-14 NOTE — ED ADULT NURSE NOTE - CARDIO WDL
Quality 431: Preventive Care And Screening: Unhealthy Alcohol Use - Screening: Patient not identified as an unhealthy alcohol user when screened for unhealthy alcohol use using a systematic screening method
Quality 130: Documentation Of Current Medications In The Medical Record: Current Medications Documented
Quality 110: Preventive Care And Screening: Influenza Immunization: Influenza Immunization Administered during Influenza season
Quality 226: Preventive Care And Screening: Tobacco Use: Screening And Cessation Intervention: Patient screened for tobacco use and is an ex/non-smoker
Detail Level: Detailed
Normal rate, regular rhythm, normal S1, S2 heart sounds heard.

## 2022-09-15 NOTE — ED ADULT TRIAGE NOTE - AS O2 DELIVERY
Writer received secure message from PSR to return call to Edilia Fuller's  to schedule an appt for her.    Writer contacted Didier Borges.  [facility] at ph: [phone].  [place], [place].      supplemental O2

## 2022-09-18 NOTE — ED PROVIDER NOTE - TEMPLATE, MLM
Dr. Cruz patient,  On:  Currently on cycle 4 D20 of pembrolizumab carboplatin (AUC 5) with weekly paclitaxel followed by pembrolizumab doxorubicin cyclophosphamide followed by pembrolizumab 200 mg q3w    Most recent Taxol on 8/29/2022     -- admit to medical oncology    Orthopedic

## 2022-10-16 NOTE — DIETITIAN INITIAL EVALUATION ADULT. - NUTRITION DIAGNOSITC TERMINOLOGY #1
qvar is back ordered until at least early November. Is there an alternate we can switch patient to until it's back in stock?    Thanks!    Emilia Martínez, [facility] [place]  [phone]     Overweight/Obesity

## 2022-10-23 NOTE — PROGRESS NOTE ADULT - PROBLEM SELECTOR PLAN 2
takes 20-40U Lantus QHS, will give 30U QHS, Humalog and HiSS  A1C 8.2
takes 20-40U Lantus QHS, will give 30U QHS, Humalog and HiSS  A1C 8.2
3

## 2022-11-19 ENCOUNTER — INPATIENT (INPATIENT)
Facility: HOSPITAL | Age: 42
LOS: 0 days | Discharge: OTHER INSTITUTION | DRG: 871 | End: 2022-11-20
Attending: STUDENT IN AN ORGANIZED HEALTH CARE EDUCATION/TRAINING PROGRAM | Admitting: STUDENT IN AN ORGANIZED HEALTH CARE EDUCATION/TRAINING PROGRAM
Payer: MEDICARE

## 2022-11-19 VITALS
DIASTOLIC BLOOD PRESSURE: 65 MMHG | SYSTOLIC BLOOD PRESSURE: 113 MMHG | OXYGEN SATURATION: 100 % | HEIGHT: 78 IN | WEIGHT: 315 LBS | HEART RATE: 112 BPM | TEMPERATURE: 98 F | RESPIRATION RATE: 18 BRPM

## 2022-11-19 DIAGNOSIS — J44.9 CHRONIC OBSTRUCTIVE PULMONARY DISEASE, UNSPECIFIED: ICD-10-CM

## 2022-11-19 DIAGNOSIS — F31.9 BIPOLAR DISORDER, UNSPECIFIED: ICD-10-CM

## 2022-11-19 DIAGNOSIS — F20.9 SCHIZOPHRENIA, UNSPECIFIED: ICD-10-CM

## 2022-11-19 DIAGNOSIS — R50.9 FEVER, UNSPECIFIED: ICD-10-CM

## 2022-11-19 DIAGNOSIS — Z29.9 ENCOUNTER FOR PROPHYLACTIC MEASURES, UNSPECIFIED: ICD-10-CM

## 2022-11-19 DIAGNOSIS — Z98.890 OTHER SPECIFIED POSTPROCEDURAL STATES: Chronic | ICD-10-CM

## 2022-11-19 DIAGNOSIS — N18.6 END STAGE RENAL DISEASE: ICD-10-CM

## 2022-11-19 DIAGNOSIS — E66.01 MORBID (SEVERE) OBESITY DUE TO EXCESS CALORIES: ICD-10-CM

## 2022-11-19 DIAGNOSIS — I10 ESSENTIAL (PRIMARY) HYPERTENSION: ICD-10-CM

## 2022-11-19 DIAGNOSIS — A41.9 SEPSIS, UNSPECIFIED ORGANISM: ICD-10-CM

## 2022-11-19 LAB
ALBUMIN SERPL ELPH-MCNC: 3 G/DL — LOW (ref 3.5–5)
ALP SERPL-CCNC: 144 U/L — HIGH (ref 40–120)
ALT FLD-CCNC: 34 U/L DA — SIGNIFICANT CHANGE UP (ref 10–60)
ANION GAP SERPL CALC-SCNC: 14 MMOL/L — SIGNIFICANT CHANGE UP (ref 5–17)
APTT BLD: 32.2 SEC — SIGNIFICANT CHANGE UP (ref 27.5–35.5)
AST SERPL-CCNC: 49 U/L — HIGH (ref 10–40)
BASOPHILS # BLD AUTO: 0.02 K/UL — SIGNIFICANT CHANGE UP (ref 0–0.2)
BASOPHILS NFR BLD AUTO: 0.2 % — SIGNIFICANT CHANGE UP (ref 0–2)
BILIRUB SERPL-MCNC: 1 MG/DL — SIGNIFICANT CHANGE UP (ref 0.2–1.2)
BUN SERPL-MCNC: 83 MG/DL — HIGH (ref 7–18)
CALCIUM SERPL-MCNC: 8.1 MG/DL — LOW (ref 8.4–10.5)
CHLORIDE SERPL-SCNC: 91 MMOL/L — LOW (ref 96–108)
CO2 SERPL-SCNC: 25 MMOL/L — SIGNIFICANT CHANGE UP (ref 22–31)
CREAT SERPL-MCNC: 12.5 MG/DL — HIGH (ref 0.5–1.3)
EGFR: 5 ML/MIN/1.73M2 — LOW
EOSINOPHIL # BLD AUTO: 0 K/UL — SIGNIFICANT CHANGE UP (ref 0–0.5)
EOSINOPHIL NFR BLD AUTO: 0 % — SIGNIFICANT CHANGE UP (ref 0–6)
GLUCOSE SERPL-MCNC: 159 MG/DL — HIGH (ref 70–99)
HCT VFR BLD CALC: 28.6 % — LOW (ref 39–50)
HGB BLD-MCNC: 9.3 G/DL — LOW (ref 13–17)
IMM GRANULOCYTES NFR BLD AUTO: 1.6 % — HIGH (ref 0–0.9)
INR BLD: 1.22 RATIO — HIGH (ref 0.88–1.16)
LACTATE SERPL-SCNC: 2 MMOL/L — SIGNIFICANT CHANGE UP (ref 0.7–2)
LYMPHOCYTES # BLD AUTO: 0.42 K/UL — LOW (ref 1–3.3)
LYMPHOCYTES # BLD AUTO: 3.6 % — LOW (ref 13–44)
MAGNESIUM SERPL-MCNC: 2.1 MG/DL — SIGNIFICANT CHANGE UP (ref 1.6–2.6)
MCHC RBC-ENTMCNC: 32.5 GM/DL — SIGNIFICANT CHANGE UP (ref 32–36)
MCHC RBC-ENTMCNC: 33 PG — SIGNIFICANT CHANGE UP (ref 27–34)
MCV RBC AUTO: 101.4 FL — HIGH (ref 80–100)
MONOCYTES # BLD AUTO: 0.85 K/UL — SIGNIFICANT CHANGE UP (ref 0–0.9)
MONOCYTES NFR BLD AUTO: 7.3 % — SIGNIFICANT CHANGE UP (ref 2–14)
NEUTROPHILS # BLD AUTO: 10.14 K/UL — HIGH (ref 1.8–7.4)
NEUTROPHILS NFR BLD AUTO: 87.3 % — HIGH (ref 43–77)
NRBC # BLD: 0 /100 WBCS — SIGNIFICANT CHANGE UP (ref 0–0)
PHOSPHATE SERPL-MCNC: 4.2 MG/DL — SIGNIFICANT CHANGE UP (ref 2.5–4.5)
PLATELET # BLD AUTO: 128 K/UL — LOW (ref 150–400)
POTASSIUM SERPL-MCNC: 4.7 MMOL/L — SIGNIFICANT CHANGE UP (ref 3.5–5.3)
POTASSIUM SERPL-SCNC: 4.7 MMOL/L — SIGNIFICANT CHANGE UP (ref 3.5–5.3)
PROT SERPL-MCNC: 7.7 G/DL — SIGNIFICANT CHANGE UP (ref 6–8.3)
PROTHROM AB SERPL-ACNC: 14.6 SEC — HIGH (ref 10.5–13.4)
RAPID RVP RESULT: SIGNIFICANT CHANGE UP
RBC # BLD: 2.82 M/UL — LOW (ref 4.2–5.8)
RBC # FLD: 14.2 % — SIGNIFICANT CHANGE UP (ref 10.3–14.5)
SARS-COV-2 RNA SPEC QL NAA+PROBE: SIGNIFICANT CHANGE UP
SODIUM SERPL-SCNC: 130 MMOL/L — LOW (ref 135–145)
WBC # BLD: 11.61 K/UL — HIGH (ref 3.8–10.5)
WBC # FLD AUTO: 11.61 K/UL — HIGH (ref 3.8–10.5)

## 2022-11-19 PROCEDURE — 71045 X-RAY EXAM CHEST 1 VIEW: CPT | Mod: 26

## 2022-11-19 PROCEDURE — 73030 X-RAY EXAM OF SHOULDER: CPT | Mod: 26,LT

## 2022-11-19 PROCEDURE — 99285 EMERGENCY DEPT VISIT HI MDM: CPT

## 2022-11-19 PROCEDURE — 73080 X-RAY EXAM OF ELBOW: CPT | Mod: 26,LT

## 2022-11-19 PROCEDURE — 99223 1ST HOSP IP/OBS HIGH 75: CPT | Mod: GC

## 2022-11-19 RX ORDER — QUETIAPINE FUMARATE 200 MG/1
1 TABLET, FILM COATED ORAL
Qty: 0 | Refills: 0 | DISCHARGE

## 2022-11-19 RX ORDER — ALBUTEROL 90 UG/1
2 AEROSOL, METERED ORAL EVERY 6 HOURS
Refills: 0 | Status: DISCONTINUED | OUTPATIENT
Start: 2022-11-19 | End: 2022-11-20

## 2022-11-19 RX ORDER — HYDROMORPHONE HYDROCHLORIDE 2 MG/ML
1 INJECTION INTRAMUSCULAR; INTRAVENOUS; SUBCUTANEOUS ONCE
Refills: 0 | Status: DISCONTINUED | OUTPATIENT
Start: 2022-11-19 | End: 2022-11-19

## 2022-11-19 RX ORDER — SODIUM CHLORIDE 9 MG/ML
250 INJECTION INTRAMUSCULAR; INTRAVENOUS; SUBCUTANEOUS ONCE
Refills: 0 | Status: COMPLETED | OUTPATIENT
Start: 2022-11-19 | End: 2022-11-19

## 2022-11-19 RX ORDER — HEPARIN SODIUM 5000 [USP'U]/ML
5000 INJECTION INTRAVENOUS; SUBCUTANEOUS EVERY 8 HOURS
Refills: 0 | Status: DISCONTINUED | OUTPATIENT
Start: 2022-11-19 | End: 2022-11-20

## 2022-11-19 RX ORDER — ACETAMINOPHEN 500 MG
650 TABLET ORAL EVERY 6 HOURS
Refills: 0 | Status: DISCONTINUED | OUTPATIENT
Start: 2022-11-19 | End: 2022-11-20

## 2022-11-19 RX ORDER — CLOPIDOGREL BISULFATE 75 MG/1
75 TABLET, FILM COATED ORAL DAILY
Refills: 0 | Status: DISCONTINUED | OUTPATIENT
Start: 2022-11-19 | End: 2022-11-20

## 2022-11-19 RX ORDER — ONDANSETRON 8 MG/1
4 TABLET, FILM COATED ORAL EVERY 8 HOURS
Refills: 0 | Status: DISCONTINUED | OUTPATIENT
Start: 2022-11-19 | End: 2022-11-20

## 2022-11-19 RX ORDER — VANCOMYCIN HCL 1 G
1000 VIAL (EA) INTRAVENOUS ONCE
Refills: 0 | Status: COMPLETED | OUTPATIENT
Start: 2022-11-19 | End: 2022-11-19

## 2022-11-19 RX ORDER — SODIUM CHLORIDE 9 MG/ML
500 INJECTION INTRAMUSCULAR; INTRAVENOUS; SUBCUTANEOUS ONCE
Refills: 0 | Status: DISCONTINUED | OUTPATIENT
Start: 2022-11-19 | End: 2022-11-19

## 2022-11-19 RX ORDER — FERROUS SULFATE 325(65) MG
325 TABLET ORAL DAILY
Refills: 0 | Status: DISCONTINUED | OUTPATIENT
Start: 2022-11-19 | End: 2022-11-20

## 2022-11-19 RX ORDER — ACETAMINOPHEN 500 MG
1000 TABLET ORAL ONCE
Refills: 0 | Status: COMPLETED | OUTPATIENT
Start: 2022-11-19 | End: 2022-11-19

## 2022-11-19 RX ORDER — CEFEPIME 1 G/1
1000 INJECTION, POWDER, FOR SOLUTION INTRAMUSCULAR; INTRAVENOUS ONCE
Refills: 0 | Status: COMPLETED | OUTPATIENT
Start: 2022-11-19 | End: 2022-11-19

## 2022-11-19 RX ORDER — MIRTAZAPINE 45 MG/1
1 TABLET, ORALLY DISINTEGRATING ORAL
Qty: 0 | Refills: 0 | DISCHARGE

## 2022-11-19 RX ORDER — RISPERIDONE 4 MG/1
2 TABLET ORAL DAILY
Refills: 0 | Status: DISCONTINUED | OUTPATIENT
Start: 2022-11-19 | End: 2022-11-20

## 2022-11-19 RX ORDER — SEVELAMER CARBONATE 2400 MG/1
800 POWDER, FOR SUSPENSION ORAL THREE TIMES A DAY
Refills: 0 | Status: DISCONTINUED | OUTPATIENT
Start: 2022-11-19 | End: 2022-11-20

## 2022-11-19 RX ORDER — SIMVASTATIN 20 MG/1
40 TABLET, FILM COATED ORAL AT BEDTIME
Refills: 0 | Status: DISCONTINUED | OUTPATIENT
Start: 2022-11-19 | End: 2022-11-20

## 2022-11-19 RX ORDER — SENNA PLUS 8.6 MG/1
2 TABLET ORAL AT BEDTIME
Refills: 0 | Status: DISCONTINUED | OUTPATIENT
Start: 2022-11-19 | End: 2022-11-20

## 2022-11-19 RX ADMIN — HYDROMORPHONE HYDROCHLORIDE 1 MILLIGRAM(S): 2 INJECTION INTRAMUSCULAR; INTRAVENOUS; SUBCUTANEOUS at 23:50

## 2022-11-19 RX ADMIN — SODIUM CHLORIDE 500 MILLILITER(S): 9 INJECTION INTRAMUSCULAR; INTRAVENOUS; SUBCUTANEOUS at 19:27

## 2022-11-19 RX ADMIN — CEFEPIME 100 MILLIGRAM(S): 1 INJECTION, POWDER, FOR SOLUTION INTRAMUSCULAR; INTRAVENOUS at 19:45

## 2022-11-19 RX ADMIN — Medication 400 MILLIGRAM(S): at 18:50

## 2022-11-19 RX ADMIN — Medication 250 MILLIGRAM(S): at 19:45

## 2022-11-19 RX ADMIN — Medication 1000 MILLIGRAM(S): at 19:38

## 2022-11-19 RX ADMIN — HYDROMORPHONE HYDROCHLORIDE 1 MILLIGRAM(S): 2 INJECTION INTRAMUSCULAR; INTRAVENOUS; SUBCUTANEOUS at 19:59

## 2022-11-19 NOTE — H&P ADULT - NSHPLABSRESULTS_GEN_ALL_CORE
9.3    11.61 )-----------( 128      ( 19 Nov 2022 17:24 )             28.6     11-19    130<L>  |  91<L>  |  83<H>  ----------------------------<  159<H>  4.7   |  25  |  12.50<H>    Ca    8.1<L>      19 Nov 2022 17:24  Phos  4.2     11-19  Mg     2.1     11-19    TPro  7.7  /  Alb  3.0<L>  /  TBili  1.0  /  DBili  x   /  AST  49<H>  /  ALT  34  /  AlkPhos  144<H>  11-19        LIVER FUNCTIONS - ( 19 Nov 2022 17:24 )  Alb: 3.0 g/dL / Pro: 7.7 g/dL / ALK PHOS: 144 U/L / ALT: 34 U/L DA / AST: 49 U/L / GGT: x           PT/INR - ( 19 Nov 2022 18:07 )   PT: 14.6 sec;   INR: 1.22 ratio         PTT - ( 19 Nov 2022 18:07 )  PTT:32.2 sec      Lactate, Blood: 2.0 mmol/L (11-19 @ 18:44)                              EKG:     RADIOLOGY STUDIES:
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
## 2022-11-19 NOTE — ED PROVIDER NOTE - IV ALTEPLASE DOOR HIDDEN
"Subjective   Que Lagunas is a 75 y.o. male.     Chief Complaint   Patient presents with   • Follow-up   • Labs Only       History of Present Illness   Patient here for follow-up.  Diabetes A1c 8.9.  Dyslipidemia on medication stable.  Vitamin D low.  Still grieving over his wife's death.  H of fibrillation stable medication.  Hypertension stable medication.    Current Outpatient Prescriptions:   •  insulin glargine (LANTUS) 100 UNIT/ML injection, Inject 40 Units under the skin Every Night., Disp: 15 mL, Rfl: 11  •  insulin lispro (HUMALOG) 100 UNIT/ML injection, Inject 10 Units under the skin 3 (Three) Times a Day Before Meals., Disp: 10 mL, Rfl: 11  •  Insulin Pen Needle (PEN NEEDLES 3/16\") 31G X 5 MM misc, Use with insulin daily E11.9, Disp: 100 each, Rfl: 5  •  lisinopril (PRINIVIL,ZESTRIL) 40 MG tablet, Take 1 tablet by mouth Daily. 200001, Disp: 30 tablet, Rfl: 11  •  metFORMIN (GLUCOPHAGE) 500 MG tablet, Take 1 tablet by mouth Daily With Breakfast., Disp: 30 tablet, Rfl: 11  •  PARoxetine (PAXIL) 20 MG tablet, Take 1 tablet by mouth Daily., Disp: 90 tablet, Rfl: 3  •  pravastatin (PRAVACHOL) 40 MG tablet, Take 1 tablet by mouth Every Evening., Disp: 30 tablet, Rfl: 11  •  rivaroxaban (XARELTO) 20 MG tablet, Take 1 tablet by mouth Daily., Disp: 30 tablet, Rfl: 11    The following portions of the patient's history were reviewed and updated as appropriate: allergies, current medications, past family history, past medical history, past social history, past surgical history and problem list.    Review of Systems   Constitutional: Negative.    Respiratory: Negative.    Cardiovascular: Negative.    Gastrointestinal: Negative.    Musculoskeletal: Negative.    Skin: Negative.    Neurological: Negative.    Psychiatric/Behavioral: Negative.        Objective   Physical Exam   Constitutional: He is oriented to person, place, and time. He appears well-nourished.   Neck: Neck supple.   Cardiovascular: Normal rate, " regular rhythm and normal heart sounds.    Pulmonary/Chest: Effort normal and breath sounds normal.   Abdominal: Bowel sounds are normal.   Neurological: He is alert and oriented to person, place, and time.   Skin: Skin is warm.   Psychiatric: He has a normal mood and affect.       All tests have been reviewed.    Assessment/Plan   There are no diagnoses linked to this encounter.            TIA continue xarelto, Echo and carotid duplex and CT head only small vessel ischemia   A.fib continue med , follow up with cardio  Hypertension continue on medication, good diet , no soda  BPH,s/p laser surgery doing better, hematuria s/p scope,  follow up with dr mclain,   Diabetes  on humalog 10u each meal tid with meal, increase lantus 40u qhs to 50u  dyslipidemia continue medicine  vitD low start  vitD3 1000u daily  Onychomycosis improved after lamisil  Anxiety continue medication  colon:divertic polyp and hemorroid done4/2015  Vaccinations up-to-date, patient declined shingrix  Balancing low, PT patient refuses     1 2week after labs need log book in a mo    show

## 2022-11-19 NOTE — H&P ADULT - PROBLEM SELECTOR PLAN 6
Morbid obesity address  patient encouraged to assume life style modifications Hx of schizophrenia  takes risperidone at home  c/w home meds
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
## 2022-11-19 NOTE — H&P ADULT - PROBLEM SELECTOR PLAN 1
Reports fever, chills and dry cough  along with episodes of NBNB emesis  found with fever 100.6 with HR elevated 112 and white count 11  anuric- UTI unlikely  CXR apprears -ve PNA unlikey  COVID/ viral likely    -s/p empiric treatment with vanc and cefepime  -s/p bolus  -c/w emperic abx  -f/u RVP  -F/u blood culture  -f/u official chest x ray read Reports fever, chills and dry cough  along with episodes of NBNB emesis  found with fever 100.6 with HR elevated 112 and white count 11  anuric- UTI unlikely  CXR apprears -ve PNA unlikely  COVID/ viral likely    -s/p empiric treatment with vanc and cefepime  -s/p bolus  -c/w emperic abx  -f/u RVP  -F/u blood culture  -f/u official chest x ray read

## 2022-11-19 NOTE — H&P ADULT - PROBLEM SELECTOR PLAN 5
Hx of schizophrenia  takes----------at home  c/w home meds Hx of bipolar disorder  takes Risperidone at home  c/w home meds

## 2022-11-19 NOTE — ED ADULT TRIAGE NOTE - CHIEF COMPLAINT QUOTE
pt is here c/o fevers , and left arm pain , also s/p fall 2 weeks ago , pt does self dialysis at home m - f , left dialysis port

## 2022-11-19 NOTE — H&P ADULT - PROBLEM SELECTOR PLAN 4
Hx of bipolar disorder  takes----------at home  c/w home meds In the setting of ESRD   pt takes no meds for BP   controlled       holding at this time due to normotensive with sepsis  will start as needed with parameters

## 2022-11-19 NOTE — H&P ADULT - PROBLEM SELECTOR PLAN 7
chetan sc On ventolin, symbicort, duoneb  c/w all home meds,  hold duonebs as patient is not in exacerbation

## 2022-11-19 NOTE — H&P ADULT - NSHPPHYSICALEXAM_GEN_ALL_CORE
T(C): 38.1 (11-19-22 @ 19:40), Max: 38.1 (11-19-22 @ 19:40)  T(F): 100.6 (11-19-22 @ 19:40), Max: 100.6 (11-19-22 @ 19:40)  HR: 108 (11-19-22 @ 19:40) (108 - 112)  BP: 131/89 (11-19-22 @ 19:40) (113/65 - 131/89)  RR: 18 (11-19-22 @ 19:40) (18 - 18)  SpO2: 98% (11-19-22 @ 19:40) (98% - 100%)    GENERAL: NAD, lying in bed comfortably  HEAD:  Atraumatic, Normocephalic  EYES: EOMI, PERRLA, conjunctiva and sclera clear  ENT: Moist mucous membranes  NECK: Supple, No JVD  CHEST/LUNG: Clear to auscultation bilaterally; No rales, rhonchi, wheezing, or rubs. Unlabored respirations  HEART: Regular rate and rhythm; No murmurs, rubs, or gallops  ABDOMEN: Bowel sounds present; Soft, Nontender, Nondistended. No hepatomegally  EXTREMITIES:  2+ Peripheral Pulses, brisk capillary refill. No clubbing, cyanosis, or edema  NERVOUS SYSTEM:  Alert & Oriented X3, speech clear. No deficits   MSK: FROM all 4 extremities, full and equal strength  SKIN: No rashes or lesions T(C): 38.1 (11-19-22 @ 19:40), Max: 38.1 (11-19-22 @ 19:40)  T(F): 100.6 (11-19-22 @ 19:40), Max: 100.6 (11-19-22 @ 19:40)  HR: 108 (11-19-22 @ 19:40) (108 - 112)  BP: 131/89 (11-19-22 @ 19:40) (113/65 - 131/89)  RR: 18 (11-19-22 @ 19:40) (18 - 18)  SpO2: 98% (11-19-22 @ 19:40) (98% - 100%)    GENERAL: NAD, obese  HEAD:  Atraumatic, Normocephalic  EYES: EOMI, PERRLA, conjunctiva and sclera clear  ENT: Moist mucous membranes  NECK: Supple, No JVD  CHEST/LUNG: Clear to auscultation bilaterally; No rales, rhonchi, wheezing, or rubs. Unlabored respirations  HEART: Regular rate and rhythm; No murmurs, rubs, or gallops  ABDOMEN: Bowel sounds present; Soft, Nontender, Nondistended. No hepatomegally  EXTREMITIES:  2+ Peripheral Pulses, brisk capillary refill. No clubbing, cyanosis, or edema  NERVOUS SYSTEM:  Alert & Oriented X3, speech clear. No deficits   MSK: FROM all 4 extremities,  Left BC fistula patent, ROM of left upper extremity and left leg limited by pain   SKIN: No rashes or lesions
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
## 2022-11-19 NOTE — H&P ADULT - NSHPREVIEWOFSYSTEMS_GEN_ALL_CORE
REVIEW OF SYSTEMS:    CONSTITUTIONAL: no weakness, +fevers or +chills  EYES/ENT: No visual changes;  No vertigo or throat pain   NECK: No pain or stiffness  RESPIRATORY: + cough, wheezing, hemoptysis; No shortness of breath  CARDIOVASCULAR: No chest pain or palpitations  GASTROINTESTINAL: No abdominal or epigastric pain. No nausea, + vomiting, or hematemesis; No diarrhea or constipation. No melena or hematochezia.  GENITOURINARY: No dysuria, frequency or hematuria  NEUROLOGICAL: No numbness or weakness  SKIN: No itching, rashes REVIEW OF SYSTEMS:    CONSTITUTIONAL: no weakness, +fevers or +chills  EYES/ENT: No visual changes;  No vertigo or throat pain   NECK: No pain or stiffness  RESPIRATORY: + cough, wheezing, hemoptysis; No shortness of breath  CARDIOVASCULAR: No chest pain or palpitations  GASTROINTESTINAL: No abdominal or epigastric pain. No nausea, + vomiting, or hematemesis; No diarrhea or constipation. No melena or hematochezia.  GENITOURINARY: No dysuria, frequency or hematuria  NEUROLOGICAL: Numbness in the last 4 fingers of left upper extremity   SKIN: No itching, rashes

## 2022-11-19 NOTE — H&P ADULT - NSHPPOAPULMEMBOLUS_GEN_A_CORE
Patient:   INGRID BOYCE            MRN: [id number]            FIN: [id number]               Age:   89 years     Sex:  MALE     :  23   Associated Diagnoses:   Shortness of breath; Hypertension; Hyperlipidemia; Hearing problem; CAD (coronary atherosclerotic disease); Atrial fibrillation; AF (paroxysmal atrial fibrillation)   Author:   ANASTASIA ROSA      Basic Information   Admit information:  Clotted pleurex catheter - dyspnea .    Source of history:  Self.       Chief Complaint   89 year old  male admitted with inability to drain pleurex catheter for last 3 days - severe dyspnea - he is admitted for further treatment - patient is chronically hypotensive and tachycardic     PMH: CAD - follows with Dr PAL Fernandez,  afib, recurrent pleural effusions with pleurex catheter, Lummi,      Review of Systems   Constitutional:  Negative except as documented in history of present illness.    Eye:  Negative except as documented in history of present illness.    Ear/Nose/Mouth/Throat:  Negative except as documented in history of present illness.    Respiratory:  Negative except as documented in history of present illness.    Cardiovascular:  Negative except as documented in history of present illness.    Gastrointestinal:  Negative except as documented in history of present illness.    Genitourinary:  Negative except as documented in history of present illness.    Hematology/Lymphatics:  Negative except as documented in history of present illness.    Endocrine:  Negative except as documented in history of present illness.    Immunologic:  Negative except as documented in history of present illness.    Musculoskeletal:  Negative except as documented in history of present illness.    Integumentary:  Negative except as documented in history of present illness.    Neurologic:  Negative except as documented in history of present illness.    Psychiatric:  Negative except as documented in history of present  illness.    ROS reviewed as documented in chart      Health Status   Allergies:    Allergic Reactions (All)  NKA  Canceled/Inactive Reactions (All)  Severity Not Documented  Allergic to bees- Swelling.   Current medications:    Medications (1) Active  Scheduled: (0)  Continuous: (1)  Sodium Chloride 0.9% 500 mL  500 mL, IV, 500 mL/hr  PRN: (0)  ,   Home Medications (11) Active  aspirin 81 mg oral tablet 81 mg = 1 tab, Oral, Daily  atorvastatin oral 40 mg tablet 40 mg = 1 tab, Oral, Daily  Augmentin oral 500-125 mg tablet 1 tab, Oral, Q24H  clopidogrel oral 75 mg tablet 75 mg = 1 tab, Oral, Daily  fludrocortisone oral 0.1 mg tablet 0.1 mg = 1 tab, Oral, Daily  metoprolol tartrate oral 25 mg tablet (Lopressor) 12.5 mg = 0.5 tab, Oral, Q12H  midodrine oral 5 mg tablet (ProAmatine) 10 mg = 2 tab, Oral, Q Mon, Wed & Fri  Heather-Cas oral tablet 1 tab, Oral, Daily  sevelamer carbonate oral 800 mg tablet 2,400 mg = 3 tab, Oral, TID  warfarin 2.5 mg oral tablet 2.5 mg = 1 tab, Oral, Q Tue, Thu, Sat & Sun  warfarin 2.5 mg oral tablet 5 mg = 2 tab, Oral, Q Mon, Wed & Fri  Medications (1) Active  Scheduled: (0)  Continuous: (1)  Sodium Chloride 0.9% 500 mL  500 mL, IV, 500 mL/hr  PRN: (0)      Problem list:    Patient Stated  Non-ST elevation MI (NSTEMI) / [id number] / Confirmed  History of end stage renal disease / 1455642216 / Confirmed  Frequency of urination / 367587140 / Confirmed  Constipation / 14785656 / Confirmed      Histories   Past Medical History: Problem List / Past Medical History   AF (paroxysmal atrial fibrillation)  Benign Essential Hypertension  Atrial fibrillation  Atrial fibrillation  CAD (coronary atherosclerotic disease)  Chronic pain  Colon neoplasm  Constipation  Dyspnea  ESRD (end stage renal disease)  Elevated cholesterol/high density lipoprotein ratio  Frequency of urination  H/O: blood transfusion  Hearing problem  Hearing problem  Hearing problem  Hearing problem  Hearing problem  Hearing  problem  History of end stage renal disease  Hyperlipidemia  Hypertension  Non-ST elevation MI (NSTEMI)  Pleural effusion  Renal insufficiency  Risk factors for obstructive sleep apnea     Family History:    Cancer  FATHER     Procedure history:    Thoracentesis (SNOMED CT 399051055) on 12/09/2017 at 89 Years.  Comments:  12/26/2017 11:26 - Padmini Rachel  1100 ml out  Partial resection of colon (SNOMED CT 19182802) in 2010 at 89 Years.  Incision of kidney (SNOMED CT 49572234) in 2000 at 79 Years.  Comments:  08/26/2016 14:45 - Karen Isabel  right kidney removed  right AV fistula.   Social History        Alcohol  Details: Alcohol Abuse in Household: No.  Use: None.  Details: Alcohol Abuse in Household: No.  Use: None.  Details: Use: None.  Details: Use: None.  Exercise  Details: Exercise: Never.  Details: Exercise: Never.  Details: Exercise: Never.  Details: Excercise: Never.  Home/Environment  Details: Alcohol Abuse in Household: No.  Substance Abuse in Household: No.  Smoker in Household: No.  Patient Lives With: Alone.  Living Situation: Lives Alone.  Ambulation: Independent.  Bathing: Required Assistance.  Dressing: Independent.  Eating: Independent.  Elimination: Independent.  Grooming: Independent.  Preparing Meal: Independent.  Taking Meds: Independent.  Toileting: Independent.  Transfers: Independent.  Details: Patient Lives With: Son.  Living Situation: Lives With Family.  Ambulation: Independent.  Bathing: Independent.  Dressing: Independent.  Driving: Not Performed.  Eating: Independent.  Elimination: Independent.  Grooming: Independent.  Preparing Meal: Independent.  Taking Meds: Independent.  Toileting: Independent.  Transfers: Independent.  Assistive Devices Home: Cane, Glasses, Hearing Aid.  Sexual  Details: Sexual orientation: Straight or heterosexual.  Gender Identity: Identifies as male.  Gender on Ins: Male.  Preferred Pronouns: Male.  Details: Sexual orientation: Straight or heterosexual.   Gender Identity: Identifies as male.  Substance Abuse  Details: Substance Abuse in Household: No.  Use: None.  Details: Use: None.  Details: Use: None.  Tobacco  Details[facility]  Smoked/Smokeless Tobacco Last 30 Days: No.  Smoking Tobacco Use: Never smoker.  Smokeless Tobacco Use Never.  Details[facility] No.  Smoked/Smokeless Tobacco Last 30 Days: No.  Smoking Tobacco Use: Never smoker.  Smokeless Tobacco Use Never.  Details: Smoked/Smokeless Tobacco Last 30 Days: No.  Use: Never smoker.  Details: Smoked/Smokeless Tobacco Last 30 Days: No.  Use: Never smoker.  Cultural/Voodoo Practices  Details: Voodoo or Cultural Practices While in Hospital: No.  Details: Voodoo or Cultural Practices While in Hospital: No.  Details: Voodoo or Cultural Practices While in Hospital: No.  .        Physical Examination   VS/Measurements        Vitals between:   14-MAY-2018 13:58:57   TO   15-MAY-2018 13:58:57                   LAST RESULT MINIMUM MAXIMUM  Temperature 36.9 36.9 36.9  Heart Rate 115 113 127  Respiratory Rate 26 22 26  NISBP           87 84 90  NIDBP           45 45 57  NIMBP           59 59 68  SpO2                    98 92 98  ,   Vitals between:   14-MAY-2018 13:58:55   TO   15-MAY-2018 13:58:55                   LAST RESULT MINIMUM MAXIMUM  Temperature 36.9 36.9 36.9  Heart Rate 115 113 127  Respiratory Rate 26 22 26  NISBP           87 84 90  NIDBP           45 45 57  NIMBP           59 59 68  SpO2                    98 92 98      Intake and Output     NO DATA QUALIFIED FOR THIS ENCOUNTER IN THE PAST 24 HOURS.      General:  Alert and oriented.    Eye:  Pupils are equal, round and reactive to light, Normal conjunctiva, Vision unchanged.    HENT:  Normocephalic, Normal hearing, Oral mucosa is moist, No pharyngeal erythema.    Neck:  Supple, Non-tender, No carotid bruit, No jugular venous distention, No lymphadenopathy, No thyromegaly.    Respiratory:  Respirations are  non-labored, Breath sounds are equal, Symmetrical chest wall expansion, No chest wall tenderness, pleurex catheter .    Cardiovascular:  Normal rate, Regular rhythm, No murmur, No gallop, Tachycardia, Good pulses equal in all extremities, No edema.    Gastrointestinal:  Soft, Non-tender, Non-distended, Normal bowel sounds, No organomegaly.    Genitourinary:  No inguinal tenderness, No lesions.    Musculoskeletal     Normal strength.     No tenderness.     Normal gait.     Integumentary:  Warm, Intact, No rash.    Neurologic:  Alert, Oriented, Normal sensory, Normal motor function, No focal deficits, Cranial Nerves II-XII are grossly intact.    Cognition and Speech:  Oriented, Speech clear and coherent, Functional cognition intact.    Psychiatric:  Cooperative, Appropriate mood & affect, Normal judgment, Non-suicidal.       Review / Management   Results review:       Labs between:  14-MAY-2018 13:58 to 15-MAY-2018 13:58    CBC:                 WBC  HgB  Hct  Plt  MCV  RDW   15-MAY-2018 8.2  (L) 11.3  (L) 35.3  259  (H) 105.1  (H) 15.4     DIFF:                 Seg  Neutroph//ABS  Lymph//ABS  Mono//ABS  EOS/ABS   15-MAY-2018 NOT APPLICABLE  76 // 6.3 9 // (L) 0.7  13 // (H) 1.1  2 // 0.1    BMP:                 Na  Cl  BUN  Glu   15-MAY-2018 138  99  (H) 33  (H) 133                              K  CO2  Cr  Ca                              4.8  31  (H) 5.43  8.9     CMP:                 AST  ALT  AlkPhos  Bili  Albumin   15-MAY-2018 (H) 42  36  104  0.6  (L) 2.7     COAG:                 INR  PT  PTT  Ddimer  Fibrinogen    15-MAY-2018 (!) 6.5  (H) 60.6  (H) 43                      ,   Labs between:  14-MAY-2018 13:58 to 15-MAY-2018 13:58    CBC:                 WBC  HgB  Hct  Plt  MCV  RDW   15-MAY-2018 8.2  (L) 11.3  (L) 35.3  259  (H) 105.1  (H) 15.4     DIFF:                 Seg  Neutroph//ABS  Lymph//ABS  Mono//ABS  EOS/ABS   15-MAY-2018 NOT APPLICABLE  76 // 6.3 9 // (L) 0.7  13 // (H) 1.1  2 // 0.1    BMP:                  Na  Cl  BUN  Glu   15-MAY-2018 138  99  (H) 33  (H) 133                              K  CO2  Cr  Ca                              4.8  31  (H) 5.43  8.9     CMP:                 AST  ALT  AlkPhos  Bili  Albumin   15-MAY-2018 (H) 42  36  104  0.6  (L) 2.7     COAG:                 INR  PT  PTT  Ddimer  Fibrinogen    15-MAY-2018 (!) 6.5  (H) 60.6  (H) 43                       .       Impression and Plan   Diagnosis     Complaint of Shortness of breath (PNED [id number], Reason For Visit, Emergency medicine, Medical).     Hypertension (AIG71-HZ I10, Diagnosis, Hospitalized, Medical).     Hyperlipidemia (BHK55-PR E78.5, Diagnosis, Hospitalized, Medical).     Hearing problem (YTX94-GE H91.90, Diagnosis, Hospitalized, Medical).     CAD (coronary atherosclerotic disease) (IQE20-AY I25.10, Diagnosis, Hospitalized, Medical).     Atrial fibrillation (HVD01-PG I48.91, Diagnosis, Hospitalized, Medical).     AF (paroxysmal atrial fibrillation) (CNF40-AU I48.0, Diagnosis, Hospitalized, Medical).     Course:  Patient is admitted -renal, cardiology and IR consulted  - patient on his way to IR for plearux catheter evaluation   He has a supratherapeutic INR .    Education and Follow-up:       Discharge Planning: Plan to discharge ( In  2-4  days ).             Electronically Signed On 05/15/2018 18:53  __________________________________________________   CELLI-NP, ANASTASIA      Electronically Signed On 05/29/2018 12:42  __________________________________________________   CORNELL HOWE     no

## 2022-11-19 NOTE — H&P ADULT - ASSESSMENT
42-year-old male with past medical history diabetes, ESRD on HD (at home Monday through Friday, had partial session yesterday), COPD on home oxygen, bipolar disorder, schizophrenia presents with fever since last night admitted for sepsis unknown source

## 2022-11-19 NOTE — ED PROVIDER NOTE - PHYSICAL EXAMINATION
CONSTITUTIONAL: non-toxic, well appearing  SKIN: no rash, no petechiae.  EYES: pink conjunctiva, anicteric  ENT: tongue and uvular midline, no exudates, mildly dry mucous membranes  NECK: Supple; no meningismus, no JVD  CARD: RRR, no murmurs, equal radial pulses bilaterally 2+  RESP: CTAB, no respiratory distress  ABD: Soft, non-tender, non-distended, no peritoneal signs  EXT: Normal ROM x4. No edema. Left AV fistula thrill present. Left elbow tender over olecranon, compartments soft, no crepitus, radial DP 2+, extremity warm, dry, well perfused, FROM of fingers.   NEURO: Alert, oriented. Neuro exam nonfocal, equal strength bilaterally  PSYCH: Cooperative, appropriate.

## 2022-11-19 NOTE — H&P ADULT - PROBLEM SELECTOR PLAN 3
In the setting of ESRD   pt takes       holding at this time due to normotensive with sepsis  will resume as needed with parameters Patient gets dialysis M-F  via left BC fistula   compliant with schedule  last session yesterday -partial  labs stable per ERSD  c/w dialysis  Dr. La to be consulted in the AM
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
## 2022-11-19 NOTE — ED ADULT NURSE NOTE - OBJECTIVE STATEMENT
As per pt, c/o LUE and LLE pain w/ generalized weakness x2 days. Pt denies all other symptoms. LUE AV fistula noted w/ (+) thrills/bruits, HD 5x weekly, last HD was 11/18/2022, but complete the treatment due to pain and cramping.

## 2022-11-20 ENCOUNTER — TRANSCRIPTION ENCOUNTER (OUTPATIENT)
Age: 42
End: 2022-11-20

## 2022-11-20 ENCOUNTER — INPATIENT (INPATIENT)
Facility: HOSPITAL | Age: 42
LOS: 18 days | Discharge: HOME | DRG: 264 | End: 2022-12-09
Attending: INTERNAL MEDICINE | Admitting: STUDENT IN AN ORGANIZED HEALTH CARE EDUCATION/TRAINING PROGRAM
Payer: MEDICARE

## 2022-11-20 VITALS
TEMPERATURE: 100 F | OXYGEN SATURATION: 99 % | RESPIRATION RATE: 18 BRPM | DIASTOLIC BLOOD PRESSURE: 86 MMHG | SYSTOLIC BLOOD PRESSURE: 148 MMHG | HEART RATE: 98 BPM

## 2022-11-20 VITALS
HEART RATE: 102 BPM | DIASTOLIC BLOOD PRESSURE: 80 MMHG | SYSTOLIC BLOOD PRESSURE: 138 MMHG | OXYGEN SATURATION: 98 % | TEMPERATURE: 99 F | RESPIRATION RATE: 19 BRPM

## 2022-11-20 DIAGNOSIS — Z98.890 OTHER SPECIFIED POSTPROCEDURAL STATES: Chronic | ICD-10-CM

## 2022-11-20 DIAGNOSIS — I73.9 PERIPHERAL VASCULAR DISEASE, UNSPECIFIED: ICD-10-CM

## 2022-11-20 LAB
ALBUMIN SERPL ELPH-MCNC: 2.6 G/DL — LOW (ref 3.5–5)
ALP SERPL-CCNC: 126 U/L — HIGH (ref 40–120)
ALT FLD-CCNC: 36 U/L DA — SIGNIFICANT CHANGE UP (ref 10–60)
ANION GAP SERPL CALC-SCNC: 13 MMOL/L — SIGNIFICANT CHANGE UP (ref 5–17)
ANISOCYTOSIS BLD QL: SLIGHT — SIGNIFICANT CHANGE UP
AST SERPL-CCNC: 46 U/L — HIGH (ref 10–40)
BASOPHILS # BLD AUTO: 0 K/UL — SIGNIFICANT CHANGE UP (ref 0–0.2)
BASOPHILS NFR BLD AUTO: 0 % — SIGNIFICANT CHANGE UP (ref 0–2)
BILIRUB SERPL-MCNC: 1.1 MG/DL — SIGNIFICANT CHANGE UP (ref 0.2–1.2)
BUN SERPL-MCNC: 90 MG/DL — HIGH (ref 7–18)
CALCIUM SERPL-MCNC: 8.3 MG/DL — LOW (ref 8.4–10.5)
CHLORIDE SERPL-SCNC: 93 MMOL/L — LOW (ref 96–108)
CO2 SERPL-SCNC: 24 MMOL/L — SIGNIFICANT CHANGE UP (ref 22–31)
CREAT SERPL-MCNC: 13.5 MG/DL — HIGH (ref 0.5–1.3)
EGFR: 4 ML/MIN/1.73M2 — LOW
EOSINOPHIL # BLD AUTO: 0 K/UL — SIGNIFICANT CHANGE UP (ref 0–0.5)
EOSINOPHIL NFR BLD AUTO: 0 % — SIGNIFICANT CHANGE UP (ref 0–6)
GLUCOSE SERPL-MCNC: 182 MG/DL — HIGH (ref 70–99)
GRAM STN FLD: SIGNIFICANT CHANGE UP
HCT VFR BLD CALC: 25.7 % — LOW (ref 39–50)
HGB BLD-MCNC: 8.5 G/DL — LOW (ref 13–17)
HYPOCHROMIA BLD QL: SLIGHT — SIGNIFICANT CHANGE UP
LYMPHOCYTES # BLD AUTO: 0.53 K/UL — LOW (ref 1–3.3)
LYMPHOCYTES # BLD AUTO: 6 % — LOW (ref 13–44)
MANUAL SMEAR VERIFICATION: SIGNIFICANT CHANGE UP
MCHC RBC-ENTMCNC: 33.1 GM/DL — SIGNIFICANT CHANGE UP (ref 32–36)
MCHC RBC-ENTMCNC: 33.2 PG — SIGNIFICANT CHANGE UP (ref 27–34)
MCV RBC AUTO: 100.4 FL — HIGH (ref 80–100)
METHOD TYPE: SIGNIFICANT CHANGE UP
MONOCYTES # BLD AUTO: 0.7 K/UL — SIGNIFICANT CHANGE UP (ref 0–0.9)
MONOCYTES NFR BLD AUTO: 8 % — SIGNIFICANT CHANGE UP (ref 2–14)
MSSA DNA SPEC QL NAA+PROBE: SIGNIFICANT CHANGE UP
NEUTROPHILS # BLD AUTO: 7.53 K/UL — HIGH (ref 1.8–7.4)
NEUTROPHILS NFR BLD AUTO: 70 % — SIGNIFICANT CHANGE UP (ref 43–77)
NEUTS BAND # BLD: 16 % — HIGH (ref 0–8)
NRBC # BLD: 0 /100 — SIGNIFICANT CHANGE UP (ref 0–0)
PLAT MORPH BLD: NORMAL — SIGNIFICANT CHANGE UP
PLATELET # BLD AUTO: 112 K/UL — LOW (ref 150–400)
PLATELET COUNT - ESTIMATE: ABNORMAL
POIKILOCYTOSIS BLD QL AUTO: SLIGHT — SIGNIFICANT CHANGE UP
POLYCHROMASIA BLD QL SMEAR: SLIGHT — SIGNIFICANT CHANGE UP
POTASSIUM SERPL-MCNC: 4.8 MMOL/L — SIGNIFICANT CHANGE UP (ref 3.5–5.3)
POTASSIUM SERPL-SCNC: 4.8 MMOL/L — SIGNIFICANT CHANGE UP (ref 3.5–5.3)
PROT SERPL-MCNC: 7.2 G/DL — SIGNIFICANT CHANGE UP (ref 6–8.3)
RBC # BLD: 2.56 M/UL — LOW (ref 4.2–5.8)
RBC # FLD: 14.4 % — SIGNIFICANT CHANGE UP (ref 10.3–14.5)
RBC BLD AUTO: ABNORMAL
SARS-COV-2 RNA SPEC QL NAA+PROBE: SIGNIFICANT CHANGE UP
SODIUM SERPL-SCNC: 130 MMOL/L — LOW (ref 135–145)
SPECIMEN SOURCE: SIGNIFICANT CHANGE UP
WBC # BLD: 8.75 K/UL — SIGNIFICANT CHANGE UP (ref 3.8–10.5)
WBC # FLD AUTO: 8.75 K/UL — SIGNIFICANT CHANGE UP (ref 3.8–10.5)

## 2022-11-20 PROCEDURE — 71045 X-RAY EXAM CHEST 1 VIEW: CPT

## 2022-11-20 PROCEDURE — 85025 COMPLETE CBC W/AUTO DIFF WBC: CPT

## 2022-11-20 PROCEDURE — 80053 COMPREHEN METABOLIC PANEL: CPT

## 2022-11-20 PROCEDURE — 36415 COLL VENOUS BLD VENIPUNCTURE: CPT

## 2022-11-20 PROCEDURE — 99285 EMERGENCY DEPT VISIT HI MDM: CPT | Mod: 25

## 2022-11-20 PROCEDURE — 87186 SC STD MICRODIL/AGAR DIL: CPT

## 2022-11-20 PROCEDURE — 99221 1ST HOSP IP/OBS SF/LOW 40: CPT

## 2022-11-20 PROCEDURE — 83605 ASSAY OF LACTIC ACID: CPT

## 2022-11-20 PROCEDURE — 93971 EXTREMITY STUDY: CPT

## 2022-11-20 PROCEDURE — 83735 ASSAY OF MAGNESIUM: CPT

## 2022-11-20 PROCEDURE — 87635 SARS-COV-2 COVID-19 AMP PRB: CPT

## 2022-11-20 PROCEDURE — 0225U NFCT DS DNA&RNA 21 SARSCOV2: CPT

## 2022-11-20 PROCEDURE — 73030 X-RAY EXAM OF SHOULDER: CPT

## 2022-11-20 PROCEDURE — 85730 THROMBOPLASTIN TIME PARTIAL: CPT

## 2022-11-20 PROCEDURE — 85610 PROTHROMBIN TIME: CPT

## 2022-11-20 PROCEDURE — 84100 ASSAY OF PHOSPHORUS: CPT

## 2022-11-20 PROCEDURE — 93971 EXTREMITY STUDY: CPT | Mod: 26,LT

## 2022-11-20 PROCEDURE — 87040 BLOOD CULTURE FOR BACTERIA: CPT

## 2022-11-20 PROCEDURE — 99233 SBSQ HOSP IP/OBS HIGH 50: CPT | Mod: GC

## 2022-11-20 PROCEDURE — 87150 DNA/RNA AMPLIFIED PROBE: CPT

## 2022-11-20 PROCEDURE — 96374 THER/PROPH/DIAG INJ IV PUSH: CPT

## 2022-11-20 PROCEDURE — 73080 X-RAY EXAM OF ELBOW: CPT

## 2022-11-20 RX ORDER — HYDROMORPHONE HYDROCHLORIDE 2 MG/ML
1 INJECTION INTRAMUSCULAR; INTRAVENOUS; SUBCUTANEOUS ONCE
Refills: 0 | Status: DISCONTINUED | OUTPATIENT
Start: 2022-11-20 | End: 2022-11-20

## 2022-11-20 RX ORDER — HYDROMORPHONE HYDROCHLORIDE 2 MG/ML
2 INJECTION INTRAMUSCULAR; INTRAVENOUS; SUBCUTANEOUS EVERY 6 HOURS
Refills: 0 | Status: DISCONTINUED | OUTPATIENT
Start: 2022-11-20 | End: 2022-11-20

## 2022-11-20 RX ORDER — CEFEPIME 1 G/1
1000 INJECTION, POWDER, FOR SOLUTION INTRAMUSCULAR; INTRAVENOUS DAILY
Refills: 0 | Status: DISCONTINUED | OUTPATIENT
Start: 2022-11-20 | End: 2022-11-20

## 2022-11-20 RX ORDER — HEPARIN SODIUM 5000 [USP'U]/ML
7500 INJECTION INTRAVENOUS; SUBCUTANEOUS EVERY 8 HOURS
Refills: 0 | Status: DISCONTINUED | OUTPATIENT
Start: 2022-11-20 | End: 2022-11-20

## 2022-11-20 RX ORDER — HYDROMORPHONE HYDROCHLORIDE 2 MG/ML
1 INJECTION INTRAMUSCULAR; INTRAVENOUS; SUBCUTANEOUS EVERY 4 HOURS
Refills: 0 | Status: DISCONTINUED | OUTPATIENT
Start: 2022-11-20 | End: 2022-11-20

## 2022-11-20 RX ORDER — CEFAZOLIN SODIUM 1 G
1000 VIAL (EA) INJECTION EVERY 24 HOURS
Refills: 0 | Status: DISCONTINUED | OUTPATIENT
Start: 2022-11-20 | End: 2022-11-20

## 2022-11-20 RX ORDER — CEFAZOLIN SODIUM 1 G
1 VIAL (EA) INJECTION
Qty: 0 | Refills: 0 | DISCHARGE
Start: 2022-11-20

## 2022-11-20 RX ORDER — FUROSEMIDE 40 MG
1 TABLET ORAL
Qty: 0 | Refills: 0 | DISCHARGE

## 2022-11-20 RX ORDER — VANCOMYCIN HCL 1 G
1000 VIAL (EA) INTRAVENOUS ONCE
Refills: 0 | Status: COMPLETED | OUTPATIENT
Start: 2022-11-20 | End: 2022-11-20

## 2022-11-20 RX ORDER — HYDROMORPHONE HYDROCHLORIDE 2 MG/ML
1 INJECTION INTRAMUSCULAR; INTRAVENOUS; SUBCUTANEOUS
Qty: 0 | Refills: 0 | DISCHARGE
Start: 2022-11-20

## 2022-11-20 RX ORDER — ERYTHROPOIETIN 10000 [IU]/ML
10000 INJECTION, SOLUTION INTRAVENOUS; SUBCUTANEOUS
Refills: 0 | Status: DISCONTINUED | OUTPATIENT
Start: 2022-11-20 | End: 2022-11-20

## 2022-11-20 RX ORDER — ACETAMINOPHEN WITH CODEINE 300MG-30MG
1 TABLET ORAL
Qty: 0 | Refills: 0 | DISCHARGE

## 2022-11-20 RX ADMIN — HYDROMORPHONE HYDROCHLORIDE 2 MILLIGRAM(S): 2 INJECTION INTRAMUSCULAR; INTRAVENOUS; SUBCUTANEOUS at 13:46

## 2022-11-20 RX ADMIN — HYDROMORPHONE HYDROCHLORIDE 1 MILLIGRAM(S): 2 INJECTION INTRAMUSCULAR; INTRAVENOUS; SUBCUTANEOUS at 20:59

## 2022-11-20 RX ADMIN — ONDANSETRON 4 MILLIGRAM(S): 8 TABLET, FILM COATED ORAL at 09:08

## 2022-11-20 RX ADMIN — HYDROMORPHONE HYDROCHLORIDE 2 MILLIGRAM(S): 2 INJECTION INTRAMUSCULAR; INTRAVENOUS; SUBCUTANEOUS at 14:46

## 2022-11-20 RX ADMIN — HEPARIN SODIUM 7500 UNIT(S): 5000 INJECTION INTRAVENOUS; SUBCUTANEOUS at 13:48

## 2022-11-20 RX ADMIN — CLOPIDOGREL BISULFATE 75 MILLIGRAM(S): 75 TABLET, FILM COATED ORAL at 12:12

## 2022-11-20 RX ADMIN — RISPERIDONE 2 MILLIGRAM(S): 4 TABLET ORAL at 12:12

## 2022-11-20 RX ADMIN — Medication 325 MILLIGRAM(S): at 12:12

## 2022-11-20 RX ADMIN — Medication 20 MILLIGRAM(S): at 12:12

## 2022-11-20 RX ADMIN — Medication 250 MILLIGRAM(S): at 12:12

## 2022-11-20 RX ADMIN — HYDROMORPHONE HYDROCHLORIDE 1 MILLIGRAM(S): 2 INJECTION INTRAMUSCULAR; INTRAVENOUS; SUBCUTANEOUS at 09:07

## 2022-11-20 RX ADMIN — HYDROMORPHONE HYDROCHLORIDE 1 MILLIGRAM(S): 2 INJECTION INTRAMUSCULAR; INTRAVENOUS; SUBCUTANEOUS at 00:06

## 2022-11-20 RX ADMIN — HYDROMORPHONE HYDROCHLORIDE 1 MILLIGRAM(S): 2 INJECTION INTRAMUSCULAR; INTRAVENOUS; SUBCUTANEOUS at 18:54

## 2022-11-20 RX ADMIN — HYDROMORPHONE HYDROCHLORIDE 1 MILLIGRAM(S): 2 INJECTION INTRAMUSCULAR; INTRAVENOUS; SUBCUTANEOUS at 08:55

## 2022-11-20 RX ADMIN — HEPARIN SODIUM 5000 UNIT(S): 5000 INJECTION INTRAVENOUS; SUBCUTANEOUS at 05:27

## 2022-11-20 RX ADMIN — SEVELAMER CARBONATE 800 MILLIGRAM(S): 2400 POWDER, FOR SUSPENSION ORAL at 05:28

## 2022-11-20 RX ADMIN — SEVELAMER CARBONATE 800 MILLIGRAM(S): 2400 POWDER, FOR SUSPENSION ORAL at 16:25

## 2022-11-20 RX ADMIN — HYDROMORPHONE HYDROCHLORIDE 1 MILLIGRAM(S): 2 INJECTION INTRAMUSCULAR; INTRAVENOUS; SUBCUTANEOUS at 05:34

## 2022-11-20 RX ADMIN — Medication 100 MILLIGRAM(S): at 16:25

## 2022-11-20 RX ADMIN — HYDROMORPHONE HYDROCHLORIDE 1 MILLIGRAM(S): 2 INJECTION INTRAMUSCULAR; INTRAVENOUS; SUBCUTANEOUS at 00:07

## 2022-11-20 RX ADMIN — HYDROMORPHONE HYDROCHLORIDE 1 MILLIGRAM(S): 2 INJECTION INTRAMUSCULAR; INTRAVENOUS; SUBCUTANEOUS at 05:04
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
## 2022-11-20 NOTE — PROGRESS NOTE ADULT - SUBJECTIVE AND OBJECTIVE BOX
PGY-1 Progress Note discussed with attending      PLEASE CONTACT ON CALL TEAM:  - On Call Team (Please refer to Janine) FROM 5:00 PM - 8:30PM  - Nightfloat Team FROM 8:30 -7:30 AM    INTERVAL HPI/OVERNIGHT EVENTS:       REVIEW OF SYSTEMS:  CONSTITUTIONAL: No fever, weight loss, or fatigue  RESPIRATORY: No cough, wheezing, chills or hemoptysis; No shortness of breath  CARDIOVASCULAR: No chest pain, palpitations, dizziness, or leg swelling  GASTROINTESTINAL: No abdominal pain. No nausea, vomiting, or hematemesis; No diarrhea or constipation. No melena or hematochezia.  GENITOURINARY: No dysuria or hematuria, urinary frequency  NEUROLOGICAL: No headaches, memory loss, loss of strength, numbness, or tremors  SKIN: No itching, burning, rashes, or lesions     MEDICATIONS  (STANDING):  clopidogrel Tablet 75 milliGRAM(s) Oral daily  ferrous    sulfate 325 milliGRAM(s) Oral daily  heparin   Injectable 5000 Unit(s) SubCutaneous every 8 hours  PARoxetine 20 milliGRAM(s) Oral daily  risperiDONE   Tablet 2 milliGRAM(s) Oral daily  senna 2 Tablet(s) Oral at bedtime  sevelamer carbonate 800 milliGRAM(s) Oral three times a day  simvastatin 40 milliGRAM(s) Oral at bedtime    MEDICATIONS  (PRN):  acetaminophen     Tablet .. 650 milliGRAM(s) Oral every 6 hours PRN Temp greater or equal to 38C (100.4F), Mild Pain (1 - 3)  albuterol    90 MICROgram(s) HFA Inhaler 2 Puff(s) Inhalation every 6 hours PRN Wheezing  HYDROmorphone  Injectable 1 milliGRAM(s) IV Push every 4 hours PRN Severe Pain (7 - 10)  ondansetron Injectable 4 milliGRAM(s) IV Push every 8 hours PRN Nausea and/or Vomiting      Vital Signs Last 24 Hrs  T(C): 37.3 (20 Nov 2022 04:39), Max: 38.1 (19 Nov 2022 19:40)  T(F): 99.1 (20 Nov 2022 04:39), Max: 100.6 (19 Nov 2022 19:40)  HR: 100 (20 Nov 2022 04:39) (100 - 112)  BP: 100/49 (20 Nov 2022 04:39) (100/49 - 131/89)  BP(mean): --  RR: 20 (20 Nov 2022 04:39) (18 - 22)  SpO2: 100% (20 Nov 2022 04:39) (98% - 100%)    Parameters below as of 20 Nov 2022 04:39  Patient On (Oxygen Delivery Method): nasal cannula  O2 Flow (L/min): 4      PHYSICAL EXAMINATION:  GENERAL: NAD, well built  HEAD:  Atraumatic, Normocephalic  EYES:  conjunctiva and sclera clear  NECK: Supple, No JVD, Normal thyroid  CHEST/LUNG: Clear to auscultation. Clear to percussion bilaterally; No rales, rhonchi, wheezing, or rubs  HEART: Regular rate and rhythm; No murmurs, rubs, or gallops  ABDOMEN: Soft, Nontender, Nondistended; Bowel sounds present, no pain or masses on palpation  NERVOUS SYSTEM:  Alert & Oriented X3  : voiding well  EXTREMITIES:  2+ Peripheral Pulses, No clubbing, cyanosis, or edema  SKIN: warm dry                          8.5    8.75  )-----------( 112      ( 20 Nov 2022 05:48 )             25.7     11-20    130<L>  |  93<L>  |  90<H>  ----------------------------<  182<H>  4.8   |  24  |  13.50<H>    Ca    8.3<L>      20 Nov 2022 05:48  Phos  4.2     11-19  Mg     2.1     11-19    TPro  7.2  /  Alb  2.6<L>  /  TBili  1.1  /  DBili  x   /  AST  46<H>  /  ALT  36  /  AlkPhos  126<H>  11-20    LIVER FUNCTIONS - ( 20 Nov 2022 05:48 )  Alb: 2.6 g/dL / Pro: 7.2 g/dL / ALK PHOS: 126 U/L / ALT: 36 U/L DA / AST: 46 U/L / GGT: x               PT/INR - ( 19 Nov 2022 18:07 )   PT: 14.6 sec;   INR: 1.22 ratio         PTT - ( 19 Nov 2022 18:07 )  PTT:32.2 sec    I&O's Summary          CAPILLARY BLOOD GLUCOSE      RADIOLOGY & ADDITIONAL TESTS:                   PGY-1 Progress Note discussed with attending      PLEASE CONTACT ON CALL TEAM:  - On Call Team (Please refer to Janine) FROM 5:00 PM - 8:30PM  - Nightfloat Team FROM 8:30 -7:30 AM    INTERVAL HPI/OVERNIGHT EVENTS: Patient examined at bedside this AM.  Patient complains of HA, Nausea, chest pain, RUE, LUE, LLE weakness, and nose bleed in the left nostril.  An ekg was preformed and showed NSR, without acute ST changes. Patient denies worse shortness of breath than at baseline, fever, chills, abdominal pain, or pain with urination.      REVIEW OF SYSTEMS:  CONSTITUTIONAL: POSITIVE GENERALIZED WEAKNESS; No fever, weight loss; POSITIVE LEFT NOSTRIL bleed  RESPIRATORY: POSITIVE cough, No wheezing, chills or hemoptysis; No shortness of breath, on 3-4L NC at baseline  CARDIOVASCULAR: POSITIVE chest pain, NO palpitations, dizziness, or leg swelling  GASTROINTESTINAL: No abdominal pain. Positive nausea, NO vomiting, or hematemesis; No diarrhea or constipation. No melena or hematochezia.  GENITOURINARY: No dysuria or hematuria, urinary frequency  NEUROLOGICAL: POSITIVE headaches, WEAKNESS; NO memory loss, loss of strength, numbness, or tremors  SKIN: No itching, burning, rashes, or lesions     MEDICATIONS  (STANDING):  clopidogrel Tablet 75 milliGRAM(s) Oral daily  ferrous    sulfate 325 milliGRAM(s) Oral daily  heparin   Injectable 5000 Unit(s) SubCutaneous every 8 hours  PARoxetine 20 milliGRAM(s) Oral daily  risperiDONE   Tablet 2 milliGRAM(s) Oral daily  senna 2 Tablet(s) Oral at bedtime  sevelamer carbonate 800 milliGRAM(s) Oral three times a day  simvastatin 40 milliGRAM(s) Oral at bedtime    MEDICATIONS  (PRN):  acetaminophen     Tablet .. 650 milliGRAM(s) Oral every 6 hours PRN Temp greater or equal to 38C (100.4F), Mild Pain (1 - 3)  albuterol    90 MICROgram(s) HFA Inhaler 2 Puff(s) Inhalation every 6 hours PRN Wheezing  HYDROmorphone  Injectable 1 milliGRAM(s) IV Push every 4 hours PRN Severe Pain (7 - 10)  ondansetron Injectable 4 milliGRAM(s) IV Push every 8 hours PRN Nausea and/or Vomiting      Vital Signs Last 24 Hrs  T(C): 37.3 (20 Nov 2022 04:39), Max: 38.1 (19 Nov 2022 19:40)  T(F): 99.1 (20 Nov 2022 04:39), Max: 100.6 (19 Nov 2022 19:40)  HR: 100 (20 Nov 2022 04:39) (100 - 112)  BP: 100/49 (20 Nov 2022 04:39) (100/49 - 131/89)  BP(mean): --  RR: 20 (20 Nov 2022 04:39) (18 - 22)  SpO2: 100% (20 Nov 2022 04:39) (98% - 100%)    Parameters below as of 20 Nov 2022 04:39  Patient On (Oxygen Delivery Method): nasal cannula  O2 Flow (L/min): 4      PHYSICAL EXAMINATION:  GENERAL: NAD, well built  HEAD:  Atraumatic, Normocephalic  EYES:  conjunctiva and sclera clear  NECK: Supple, No JVD, Normal thyroid  CHEST/LUNG: Clear to auscultation. Clear to percussion bilaterally; No rales, rhonchi, wheezing, or rubs  HEART: Regular rate and rhythm; No murmurs, rubs, or gallops  ABDOMEN: Soft, Nontender, Nondistended; Bowel sounds present, no pain or masses on palpation  NERVOUS SYSTEM:  Alert & Oriented X3  : voiding well  EXTREMITIES:  2+ Peripheral Pulses, No clubbing, cyanosis, or edema  SKIN: warm dry                          8.5    8.75  )-----------( 112      ( 20 Nov 2022 05:48 )             25.7     11-20    130<L>  |  93<L>  |  90<H>  ----------------------------<  182<H>  4.8   |  24  |  13.50<H>    Ca    8.3<L>      20 Nov 2022 05:48  Phos  4.2     11-19  Mg     2.1     11-19    TPro  7.2  /  Alb  2.6<L>  /  TBili  1.1  /  DBili  x   /  AST  46<H>  /  ALT  36  /  AlkPhos  126<H>  11-20    LIVER FUNCTIONS - ( 20 Nov 2022 05:48 )  Alb: 2.6 g/dL / Pro: 7.2 g/dL / ALK PHOS: 126 U/L / ALT: 36 U/L DA / AST: 46 U/L / GGT: x               PT/INR - ( 19 Nov 2022 18:07 )   PT: 14.6 sec;   INR: 1.22 ratio         PTT - ( 19 Nov 2022 18:07 )  PTT:32.2 sec    I&O's Summary          CAPILLARY BLOOD GLUCOSE      RADIOLOGY & ADDITIONAL TESTS:                   PGY-1 Progress Note discussed with attending      PLEASE CONTACT ON CALL TEAM:  - On Call Team (Please refer to Janine) FROM 5:00 PM - 8:30PM  - Nightfloat Team FROM 8:30 -7:30 AM    INTERVAL HPI/OVERNIGHT EVENTS: Patient examined at bedside this AM.  Patient complains of HA, Nausea, chest pain, RUE, LUE, LLE weakness, and nose bleed in the left nostril.  An ekg was preformed and showed NSR, without acute ST changes. Patient denies worse shortness of breath than at baseline, fever, chills, abdominal pain, or pain with urination.      REVIEW OF SYSTEMS:  CONSTITUTIONAL: POSITIVE GENERALIZED WEAKNESS; No fever, weight loss; POSITIVE LEFT NOSTRIL bleed  RESPIRATORY: POSITIVE cough, No wheezing, chills or hemoptysis; No shortness of breath, on 3-4L NC at baseline  CARDIOVASCULAR: POSITIVE chest pain, NO palpitations, dizziness, or leg swelling  GASTROINTESTINAL: No abdominal pain. Positive nausea, NO vomiting, or hematemesis; No diarrhea or constipation. No melena or hematochezia.  GENITOURINARY: No dysuria or hematuria, urinary frequency  NEUROLOGICAL: POSITIVE headaches, WEAKNESS; NO memory loss, loss of strength, numbness, or tremors  SKIN: No itching, burning, rashes, or lesions     MEDICATIONS  (STANDING):  clopidogrel Tablet 75 milliGRAM(s) Oral daily  ferrous    sulfate 325 milliGRAM(s) Oral daily  heparin   Injectable 5000 Unit(s) SubCutaneous every 8 hours  PARoxetine 20 milliGRAM(s) Oral daily  risperiDONE   Tablet 2 milliGRAM(s) Oral daily  senna 2 Tablet(s) Oral at bedtime  sevelamer carbonate 800 milliGRAM(s) Oral three times a day  simvastatin 40 milliGRAM(s) Oral at bedtime    MEDICATIONS  (PRN):  acetaminophen     Tablet .. 650 milliGRAM(s) Oral every 6 hours PRN Temp greater or equal to 38C (100.4F), Mild Pain (1 - 3)  albuterol    90 MICROgram(s) HFA Inhaler 2 Puff(s) Inhalation every 6 hours PRN Wheezing  HYDROmorphone  Injectable 1 milliGRAM(s) IV Push every 4 hours PRN Severe Pain (7 - 10)  ondansetron Injectable 4 milliGRAM(s) IV Push every 8 hours PRN Nausea and/or Vomiting      Vital Signs Last 24 Hrs  T(C): 37.3 (20 Nov 2022 04:39), Max: 38.1 (19 Nov 2022 19:40)  T(F): 99.1 (20 Nov 2022 04:39), Max: 100.6 (19 Nov 2022 19:40)  HR: 100 (20 Nov 2022 04:39) (100 - 112)  BP: 100/49 (20 Nov 2022 04:39) (100/49 - 131/89)  BP(mean): --  RR: 20 (20 Nov 2022 04:39) (18 - 22)  SpO2: 100% (20 Nov 2022 04:39) (98% - 100%)    Parameters below as of 20 Nov 2022 04:39  Patient On (Oxygen Delivery Method): nasal cannula  O2 Flow (L/min): 4      PHYSICAL EXAMINATION:  GENERAL: NAD, obese  HEAD:  Atraumatic, Normocephalic  EYES: EOMI, PERRLA, conjunctiva and sclera clear  ENT: Moist mucous membranes  NECK: Supple, No JVD  CHEST/LUNG: Clear to auscultation bilaterally; No rales, rhonchi, wheezing, or rubs. Unlabored respirations  HEART: Regular rate and rhythm; No murmurs, rubs, or gallops  ABDOMEN: Bowel sounds present; Soft, Nontender, Nondistended. No hepatomegally  EXTREMITIES:  2+ Peripheral Pulses, brisk capillary refill. No clubbing, cyanosis, or edema  NERVOUS SYSTEM:  Alert & Oriented X3, speech clear. No deficits   MSK: FROM all 4 extremities,  Left BC fistula patent, ROM of b/l upper extremity and left leg limited by pain   SKIN: No rashes or lesions                          8.5    8.75  )-----------( 112      ( 20 Nov 2022 05:48 )             25.7     11-20    130<L>  |  93<L>  |  90<H>  ----------------------------<  182<H>  4.8   |  24  |  13.50<H>    Ca    8.3<L>      20 Nov 2022 05:48  Phos  4.2     11-19  Mg     2.1     11-19    TPro  7.2  /  Alb  2.6<L>  /  TBili  1.1  /  DBili  x   /  AST  46<H>  /  ALT  36  /  AlkPhos  126<H>  11-20    LIVER FUNCTIONS - ( 20 Nov 2022 05:48 )  Alb: 2.6 g/dL / Pro: 7.2 g/dL / ALK PHOS: 126 U/L / ALT: 36 U/L DA / AST: 46 U/L / GGT: x               PT/INR - ( 19 Nov 2022 18:07 )   PT: 14.6 sec;   INR: 1.22 ratio         PTT - ( 19 Nov 2022 18:07 )  PTT:32.2 sec    I&O's Summary          CAPILLARY BLOOD GLUCOSE      RADIOLOGY & ADDITIONAL TESTS:

## 2022-11-20 NOTE — PROGRESS NOTE ADULT - PROBLEM SELECTOR PLAN 1
Reports fever, chills and dry cough  along with episodes of NBNB emesis  found with fever 100.6 with HR elevated 112 and white count 11  anuric- UTI unlikely  CXR apprears -ve PNA unlikely  COVID/ viral likely    -s/p empiric treatment with vanc and cefepime  -s/p bolus  -c/w emperic abx  -f/u RVP  -F/u blood culture  -f/u official chest x ray read Reports fever, chills and dry cough  along with episodes of NBNB emesis  found with fever 100.6 with HR elevated 112 and white count 11  anuric- UTI unlikely  CXR apprears -ve PNA unlikely  COVID/ viral likely    -s/p empiric treatment with vanc and cefepime  -s/p bolus  -c/w emperic abx, cefepime 1g q24 (confirmed with Pharmacy) and vancomycin  - F/U VANC TROUGH TOMORROW AM,   -f/u RVP  -blood culture - areobic gram positive in 2 bottles  - f/u repeat blood cultures   - Cxr Noted

## 2022-11-20 NOTE — PATIENT PROFILE ADULT - FALL HARM RISK - HARM RISK INTERVENTIONS
Assistance OOB with selected safe patient handling equipment/Communicate Risk of Fall with Harm to all staff/Discuss with provider need for PT consult/Monitor gait and stability/Reinforce activity limits and safety measures with patient and family/Tailored Fall Risk Interventions/Visual Cue: Yellow wristband and red socks/Bed in lowest position, wheels locked, appropriate side rails in place/Call bell, personal items and telephone in reach/Instruct patient to call for assistance before getting out of bed or chair/Non-slip footwear when patient is out of bed/[place] to call system/Physically safe environment - no spills, clutter or unnecessary equipment/Purposeful Proactive Rounding/Room/bathroom lighting operational, light cord in reach

## 2022-11-20 NOTE — PROGRESS NOTE ADULT - PROBLEM SELECTOR PLAN 4
In the setting of ESRD   pt takes no meds for BP   controlled       holding at this time due to normotensive with sepsis  will start as needed with parameters

## 2022-11-20 NOTE — PROGRESS NOTE ADULT - PROBLEM SELECTOR PLAN 3
Patient gets dialysis M-F  via left BC fistula   compliant with schedule  last session yesterday -partial  labs stable per ERSD  c/w dialysis  Dr. La to be consulted in the AM Patient gets dialysis M-F  via left BC fistula   compliant with schedule  last session yesterday -partial  labs stable per ERSD  c/w dialysis  Dr. La consulted
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
## 2022-11-20 NOTE — DISCHARGE NOTE PROVIDER - NSDCCPCAREPLAN_GEN_ALL_CORE_FT
PRINCIPAL DISCHARGE DIAGNOSIS  Diagnosis: MSSA bacteremia  Assessment and Plan of Treatment: Transfer to [facility]. Continue with ANCEF. Perform CT imaging.      SECONDARY DISCHARGE DIAGNOSES  Diagnosis: Bipolar disorder  Assessment and Plan of Treatment: Continue with home medications    Diagnosis: HTN (hypertension)  Assessment and Plan of Treatment: Continue with home medications    Diagnosis: Morbid obesity with BMI of 40.0-44.9, adult  Assessment and Plan of Treatment: Outpatient follow up    Diagnosis: Schizophrenia  Assessment and Plan of Treatment: Continue with home medications    Diagnosis: COPD (chronic obstructive pulmonary disease)  Assessment and Plan of Treatment: Continue with home medications    Diagnosis: ESRD on dialysis  Assessment and Plan of Treatment: Continue with maintenance HD

## 2022-11-20 NOTE — DISCHARGE NOTE PROVIDER - HOSPITAL COURSE
42-year-old male with past medical history diabetes, ESRD on HD (at home Monday through Friday, had partial session yesterday), COPD on home oxygen, bipolar disorder, schizophrenia presents with fever for one night admitted for fever. 42-year-old male with past medical history diabetes, ESRD on HD (at home Monday through Friday, had partial session yesterday), COPD on home oxygen, bipolar disorder, schizophrenia presents with fever for one night admitted for fever.   Patient states he had a fever of up to 104 at home and in the ed his temp went to a max of 100.6.  Patient was treated with vancomycin and cefepime 42-year-old male with past medical history diabetes, ESRD on HD (at home Monday through Friday, had partial session yesterday), COPD on home oxygen, bipolar disorder, schizophrenia presents with fever for one night admitted for fever.  Patient states he had a fever of up to 104 at home and in the ed his temp went to a max of 100.6.  Patient found to have MSSA bacteremia and started on Ancef. Concern for possible AV graft infection. Attempted to have LUE CT scan performed, but patient unable to fit into machine. After discussion with Vascular surgery decision made to transfer to [facility] since CT machine can accommodate patient. Also would pursue CT spine and bacteremia, though admittedly may be secondary to body habitus.
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
## 2022-11-20 NOTE — CHART NOTE - NSCHARTNOTEFT_GEN_A_CORE
Transfer initiated to [facility]  Tier 2 transfer  Accepting Physician: Beka Abreu    Patient with MSSA bacteremia and back pain and subjective weakness. Currently on Ancef. While patient was being dialyzed today there was concern of pus from his graft. Given his bacteremia we are concerned for graft infection. We attempted to perform CT of LUE with IV contrast, but unfortunately given patient's body habitus he was not able to fit into the machine, Per vascular surgery [facility] has a CT machine able to accommodate patient's size. Vascular recommending transfer to [facility] for further imaging and management. He will be transferred to hospitalist service there because he is currently on the medical service here.    I still plan to pursue CT spine with rule out discitis/osteomyelitis given pain and bacteremia. TTE pending, but may need ALBINO if clinical suspicion of endocarditis is high. Currently on Ancef. I will consult house ID tomorrow if patient is still here.    Plan discussed with medicine house staff & vascular surgery.

## 2022-11-20 NOTE — PROGRESS NOTE ADULT - ASSESSMENT
42-year-old male with past medical history diabetes, ESRD on HD (at home Monday through Friday, had partial session yesterday), COPD on home oxygen, bipolar disorder, schizophrenia presents with fever since last night admitted for sepsis unknown source 42-year-old male with past medical history diabetes, ESRD on HD (at home Monday through Friday, had partial session yesterday), COPD on home oxygen, bipolar disorder, schizophrenia presents with fever since last night admitted for fever.
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
## 2022-11-20 NOTE — CONSULT NOTE ADULT - ASSESSMENT
42 yoM s/p L cephalic-IJ PTFE AVG with sepsis    Bcx GPC, febrile, tachycardic/hypotensive; currently on ancef, plan to switch to cefepime/vanc  L sided weakness and pain, ?2/2 trauma vs. neurologic etiologies (pending w/u for osteo/discitis); no gross neurologic deficit at the time of exam- L shoulder/elbow XR reading pending    In the setting of bacteremia, infected graft needs to be ruled out. CT LUE with IV cont ordered. HD held off, confirmed by neph, reevaluate after CT given contrast used    Case d/w Dr. Trevino, plans/recs d/w Dr. Oscar

## 2022-11-20 NOTE — PROVIDER CONTACT NOTE (CRITICAL VALUE NOTIFICATION) - TEST AND RESULT REPORTED:
Blood culture positive, growth in aerobic bottle gram positive cocci in clusters
Growth in aerobic bottle gram positive cocci in clusters

## 2022-11-20 NOTE — PROGRESS NOTE ADULT - PROBLEM SELECTOR PLAN 6
Detail Level: Zone Otc Regimen: Aveeno, La Roche, Cetaphil or CeraVe moisturizer Plan: Continue to apply vaseline or Aquaphor once daily until the wound has fully healed. Samples Given: CeraVe and Cetaphil moisturizer Hx of schizophrenia  takes risperidone at home  c/w home meds

## 2022-11-21 DIAGNOSIS — N18.6 END STAGE RENAL DISEASE: ICD-10-CM

## 2022-11-21 DIAGNOSIS — J44.9 CHRONIC OBSTRUCTIVE PULMONARY DISEASE, UNSPECIFIED: ICD-10-CM

## 2022-11-21 DIAGNOSIS — I10 ESSENTIAL (PRIMARY) HYPERTENSION: ICD-10-CM

## 2022-11-21 DIAGNOSIS — A41.9 SEPSIS, UNSPECIFIED ORGANISM: ICD-10-CM

## 2022-11-21 DIAGNOSIS — M25.519 PAIN IN UNSPECIFIED SHOULDER: ICD-10-CM

## 2022-11-21 DIAGNOSIS — F20.9 SCHIZOPHRENIA, UNSPECIFIED: ICD-10-CM

## 2022-11-21 DIAGNOSIS — Z29.9 ENCOUNTER FOR PROPHYLACTIC MEASURES, UNSPECIFIED: ICD-10-CM

## 2022-11-21 DIAGNOSIS — F31.9 BIPOLAR DISORDER, UNSPECIFIED: ICD-10-CM

## 2022-11-21 LAB
ALBUMIN SERPL ELPH-MCNC: 3.4 G/DL — SIGNIFICANT CHANGE UP (ref 3.3–5)
ALP SERPL-CCNC: 146 U/L — HIGH (ref 40–120)
ALT FLD-CCNC: 28 U/L — SIGNIFICANT CHANGE UP (ref 10–45)
ANION GAP SERPL CALC-SCNC: 25 MMOL/L — HIGH (ref 5–17)
APTT BLD: 35.4 SEC — SIGNIFICANT CHANGE UP (ref 27.5–35.5)
AST SERPL-CCNC: 36 U/L — SIGNIFICANT CHANGE UP (ref 10–40)
BILIRUB SERPL-MCNC: 0.8 MG/DL — SIGNIFICANT CHANGE UP (ref 0.2–1.2)
BLD GP AB SCN SERPL QL: NEGATIVE — SIGNIFICANT CHANGE UP
BUN SERPL-MCNC: 100 MG/DL — HIGH (ref 7–23)
CALCIUM SERPL-MCNC: 8.8 MG/DL — SIGNIFICANT CHANGE UP (ref 8.4–10.5)
CHLORIDE SERPL-SCNC: 84 MMOL/L — LOW (ref 96–108)
CO2 SERPL-SCNC: 20 MMOL/L — LOW (ref 22–31)
CREAT SERPL-MCNC: 13.97 MG/DL — HIGH (ref 0.5–1.3)
CRP SERPL-MCNC: 320 MG/L — HIGH (ref 0–4)
EGFR: 4 ML/MIN/1.73M2 — LOW
ERYTHROCYTE [SEDIMENTATION RATE] IN BLOOD: 89 MM/HR — HIGH (ref 0–15)
GLUCOSE SERPL-MCNC: 97 MG/DL — SIGNIFICANT CHANGE UP (ref 70–99)
HCT VFR BLD CALC: 25.8 % — LOW (ref 39–50)
HGB BLD-MCNC: 8.3 G/DL — LOW (ref 13–17)
INR BLD: 1.15 RATIO — SIGNIFICANT CHANGE UP (ref 0.88–1.16)
MAGNESIUM SERPL-MCNC: 2.3 MG/DL — SIGNIFICANT CHANGE UP (ref 1.6–2.6)
MCHC RBC-ENTMCNC: 32.2 GM/DL — SIGNIFICANT CHANGE UP (ref 32–36)
MCHC RBC-ENTMCNC: 32.3 PG — SIGNIFICANT CHANGE UP (ref 27–34)
MCV RBC AUTO: 100.4 FL — HIGH (ref 80–100)
NRBC # BLD: 0 /100 WBCS — SIGNIFICANT CHANGE UP (ref 0–0)
PHOSPHATE SERPL-MCNC: 5.4 MG/DL — HIGH (ref 2.5–4.5)
PLATELET # BLD AUTO: 122 K/UL — LOW (ref 150–400)
POTASSIUM SERPL-MCNC: 5.6 MMOL/L — HIGH (ref 3.5–5.3)
POTASSIUM SERPL-SCNC: 5.6 MMOL/L — HIGH (ref 3.5–5.3)
PROT SERPL-MCNC: 7.5 G/DL — SIGNIFICANT CHANGE UP (ref 6–8.3)
PROTHROM AB SERPL-ACNC: 13.2 SEC — SIGNIFICANT CHANGE UP (ref 10.5–13.4)
RBC # BLD: 2.57 M/UL — LOW (ref 4.2–5.8)
RBC # FLD: 14.7 % — HIGH (ref 10.3–14.5)
RH IG SCN BLD-IMP: NEGATIVE — SIGNIFICANT CHANGE UP
SODIUM SERPL-SCNC: 129 MMOL/L — LOW (ref 135–145)
WBC # BLD: 8.46 K/UL — SIGNIFICANT CHANGE UP (ref 3.8–10.5)
WBC # FLD AUTO: 8.46 K/UL — SIGNIFICANT CHANGE UP (ref 3.8–10.5)

## 2022-11-21 PROCEDURE — 73206 CT ANGIO UPR EXTRM W/O&W/DYE: CPT | Mod: 26,LT

## 2022-11-21 PROCEDURE — 71260 CT THORAX DX C+: CPT | Mod: 26

## 2022-11-21 PROCEDURE — 73560 X-RAY EXAM OF KNEE 1 OR 2: CPT | Mod: 26,LT

## 2022-11-21 PROCEDURE — 36556 INSERT NON-TUNNEL CV CATH: CPT | Mod: RT

## 2022-11-21 PROCEDURE — 72129 CT CHEST SPINE W/DYE: CPT | Mod: 26

## 2022-11-21 PROCEDURE — 74177 CT ABD & PELVIS W/CONTRAST: CPT | Mod: 26

## 2022-11-21 PROCEDURE — 72126 CT NECK SPINE W/DYE: CPT | Mod: 26

## 2022-11-21 PROCEDURE — 99223 1ST HOSP IP/OBS HIGH 75: CPT | Mod: GC

## 2022-11-21 PROCEDURE — 72132 CT LUMBAR SPINE W/DYE: CPT | Mod: 26

## 2022-11-21 RX ORDER — HYDROMORPHONE HYDROCHLORIDE 2 MG/ML
2 INJECTION INTRAMUSCULAR; INTRAVENOUS; SUBCUTANEOUS EVERY 4 HOURS
Refills: 0 | Status: DISCONTINUED | OUTPATIENT
Start: 2022-11-21 | End: 2022-11-23

## 2022-11-21 RX ORDER — BNT162B2 ORIGINAL AND OMICRON BA.4/BA.5 .1125; .1125 MG/2.25ML; MG/2.25ML
0.3 INJECTION, SUSPENSION INTRAMUSCULAR ONCE
Refills: 0 | Status: DISCONTINUED | OUTPATIENT
Start: 2022-11-21 | End: 2022-11-23

## 2022-11-21 RX ORDER — ERYTHROPOIETIN 10000 [IU]/ML
20000 INJECTION, SOLUTION INTRAVENOUS; SUBCUTANEOUS
Refills: 0 | Status: DISCONTINUED | OUTPATIENT
Start: 2022-11-21 | End: 2022-11-23

## 2022-11-21 RX ORDER — ACETAMINOPHEN 500 MG
650 TABLET ORAL EVERY 6 HOURS
Refills: 0 | Status: DISCONTINUED | OUTPATIENT
Start: 2022-11-21 | End: 2022-11-23

## 2022-11-21 RX ORDER — QUETIAPINE FUMARATE 200 MG/1
300 TABLET, FILM COATED ORAL AT BEDTIME
Refills: 0 | Status: DISCONTINUED | OUTPATIENT
Start: 2022-11-21 | End: 2022-11-21

## 2022-11-21 RX ORDER — MIRTAZAPINE 45 MG/1
15 TABLET, ORALLY DISINTEGRATING ORAL AT BEDTIME
Refills: 0 | Status: DISCONTINUED | OUTPATIENT
Start: 2022-11-21 | End: 2022-11-23

## 2022-11-21 RX ORDER — CEFAZOLIN SODIUM 1 G
1000 VIAL (EA) INJECTION EVERY 24 HOURS
Refills: 0 | Status: DISCONTINUED | OUTPATIENT
Start: 2022-11-21 | End: 2022-11-22

## 2022-11-21 RX ORDER — RISPERIDONE 4 MG/1
2 TABLET ORAL DAILY
Refills: 0 | Status: DISCONTINUED | OUTPATIENT
Start: 2022-11-21 | End: 2022-11-23

## 2022-11-21 RX ORDER — SIMVASTATIN 20 MG/1
40 TABLET, FILM COATED ORAL AT BEDTIME
Refills: 0 | Status: DISCONTINUED | OUTPATIENT
Start: 2022-11-21 | End: 2022-11-23

## 2022-11-21 RX ORDER — SODIUM CHLORIDE 9 MG/ML
10 INJECTION INTRAMUSCULAR; INTRAVENOUS; SUBCUTANEOUS
Refills: 0 | Status: DISCONTINUED | OUTPATIENT
Start: 2022-11-21 | End: 2022-11-23

## 2022-11-21 RX ORDER — ACETAMINOPHEN 500 MG
650 TABLET ORAL EVERY 6 HOURS
Refills: 0 | Status: DISCONTINUED | OUTPATIENT
Start: 2022-11-21 | End: 2022-11-21

## 2022-11-21 RX ORDER — CLOPIDOGREL BISULFATE 75 MG/1
75 TABLET, FILM COATED ORAL DAILY
Refills: 0 | Status: DISCONTINUED | OUTPATIENT
Start: 2022-11-21 | End: 2022-11-21

## 2022-11-21 RX ORDER — FERROUS SULFATE 325(65) MG
325 TABLET ORAL DAILY
Refills: 0 | Status: DISCONTINUED | OUTPATIENT
Start: 2022-11-21 | End: 2022-11-21

## 2022-11-21 RX ORDER — CHLORHEXIDINE GLUCONATE 213 G/1000ML
1 SOLUTION TOPICAL
Refills: 0 | Status: DISCONTINUED | OUTPATIENT
Start: 2022-11-21 | End: 2022-11-23

## 2022-11-21 RX ORDER — LABETALOL HCL 100 MG
10 TABLET ORAL ONCE
Refills: 0 | Status: DISCONTINUED | OUTPATIENT
Start: 2022-11-21 | End: 2022-11-21

## 2022-11-21 RX ORDER — LABETALOL HCL 100 MG
10 TABLET ORAL ONCE
Refills: 0 | Status: COMPLETED | OUTPATIENT
Start: 2022-11-21 | End: 2022-11-21

## 2022-11-21 RX ORDER — IPRATROPIUM/ALBUTEROL SULFATE 18-103MCG
3 AEROSOL WITH ADAPTER (GRAM) INHALATION EVERY 6 HOURS
Refills: 0 | Status: DISCONTINUED | OUTPATIENT
Start: 2022-11-21 | End: 2022-11-23

## 2022-11-21 RX ORDER — ALBUTEROL 90 UG/1
10 AEROSOL, METERED ORAL ONCE
Refills: 0 | Status: COMPLETED | OUTPATIENT
Start: 2022-11-21 | End: 2022-11-21

## 2022-11-21 RX ORDER — ACETAMINOPHEN 500 MG
1000 TABLET ORAL ONCE
Refills: 0 | Status: COMPLETED | OUTPATIENT
Start: 2022-11-21 | End: 2022-11-21

## 2022-11-21 RX ORDER — SEVELAMER CARBONATE 2400 MG/1
800 POWDER, FOR SUSPENSION ORAL
Refills: 0 | Status: DISCONTINUED | OUTPATIENT
Start: 2022-11-21 | End: 2022-11-23

## 2022-11-21 RX ORDER — SODIUM ZIRCONIUM CYCLOSILICATE 10 G/10G
5 POWDER, FOR SUSPENSION ORAL ONCE
Refills: 0 | Status: DISCONTINUED | OUTPATIENT
Start: 2022-11-21 | End: 2022-11-21

## 2022-11-21 RX ADMIN — RISPERIDONE 2 MILLIGRAM(S): 4 TABLET ORAL at 22:09

## 2022-11-21 RX ADMIN — HYDROMORPHONE HYDROCHLORIDE 2 MILLIGRAM(S): 2 INJECTION INTRAMUSCULAR; INTRAVENOUS; SUBCUTANEOUS at 20:04

## 2022-11-21 RX ADMIN — Medication 400 MILLIGRAM(S): at 08:58

## 2022-11-21 RX ADMIN — SEVELAMER CARBONATE 800 MILLIGRAM(S): 2400 POWDER, FOR SUSPENSION ORAL at 13:54

## 2022-11-21 RX ADMIN — ALBUTEROL 10 MILLIGRAM(S): 90 AEROSOL, METERED ORAL at 06:44

## 2022-11-21 RX ADMIN — HYDROMORPHONE HYDROCHLORIDE 2 MILLIGRAM(S): 2 INJECTION INTRAMUSCULAR; INTRAVENOUS; SUBCUTANEOUS at 20:34

## 2022-11-21 RX ADMIN — Medication 650 MILLIGRAM(S): at 01:48

## 2022-11-21 RX ADMIN — Medication 1000 MILLIGRAM(S): at 09:28

## 2022-11-21 RX ADMIN — Medication 20 MILLIGRAM(S): at 22:09

## 2022-11-21 RX ADMIN — HYDROMORPHONE HYDROCHLORIDE 2 MILLIGRAM(S): 2 INJECTION INTRAMUSCULAR; INTRAVENOUS; SUBCUTANEOUS at 10:18

## 2022-11-21 RX ADMIN — Medication 650 MILLIGRAM(S): at 01:18

## 2022-11-21 RX ADMIN — CHLORHEXIDINE GLUCONATE 1 APPLICATION(S): 213 SOLUTION TOPICAL at 22:20

## 2022-11-21 RX ADMIN — MIRTAZAPINE 15 MILLIGRAM(S): 45 TABLET, ORALLY DISINTEGRATING ORAL at 22:09

## 2022-11-21 RX ADMIN — Medication 100 MILLIGRAM(S): at 05:30

## 2022-11-21 RX ADMIN — SIMVASTATIN 40 MILLIGRAM(S): 20 TABLET, FILM COATED ORAL at 22:09

## 2022-11-21 RX ADMIN — Medication 10 MILLIGRAM(S): at 21:06

## 2022-11-21 RX ADMIN — HYDROMORPHONE HYDROCHLORIDE 2 MILLIGRAM(S): 2 INJECTION INTRAMUSCULAR; INTRAVENOUS; SUBCUTANEOUS at 11:00

## 2022-11-21 NOTE — PROGRESS NOTE ADULT - SUBJECTIVE AND OBJECTIVE BOX
Internal Medicine   Romulo Church| PGY-1    OVERNIGHT EVENTS:      SUBJECTIVE:       MEDICATIONS  (STANDING):  ceFAZolin   IVPB 1000 milliGRAM(s) IV Intermittent every 24 hours  coronavirus bivalent (EUA) Booster Vaccine (PFIZER) 0.3 milliLiter(s) IntraMuscular once  mirtazapine 15 milliGRAM(s) Oral at bedtime  PARoxetine 20 milliGRAM(s) Oral daily  risperiDONE   Tablet 2 milliGRAM(s) Oral daily  sevelamer carbonate 800 milliGRAM(s) Oral three times a day with meals  simvastatin 40 milliGRAM(s) Oral at bedtime    MEDICATIONS  (PRN):  acetaminophen     Tablet .. 650 milliGRAM(s) Oral every 6 hours PRN Temp greater or equal to 38C (100.4F), Mild Pain (1 - 3)  albuterol/ipratropium for Nebulization 3 milliLiter(s) Nebulizer every 6 hours PRN Shortness of Breath and/or Wheezing  HYDROmorphone   Tablet 2 milliGRAM(s) Oral every 4 hours PRN Severe Pain (7 - 10)        T(F): 98.7 (11-21-22 @ 06:22), Max: 100.4 (11-20-22 @ 23:11)  HR: 94 (11-21-22 @ 06:22) (72 - 102)  BP: 157/83 (11-21-22 @ 06:22) (111/56 - 157/83)  BP(mean): --  RR: 18 (11-21-22 @ 06:22) (15 - 20)  SpO2: 95% (11-21-22 @ 06:22) (94% - 100%)    PHYSICAL EXAM:     GENERAL: NAD, lying in bed comfortably.  HEAD:  Atraumatic, normocephalic.  EYES: EOMI, PERRLA, conjunctiva and sclera clear, no nystagmus noted.  ENT: Moist mucous membranes,   NECK: Supple. No JVD. Trachea midline.  CHEST/LUNG: CTAB. No rales, rhonchi, wheezing, or rubs. Unlabored respirations.  HEART: RRR, no M/R/G, normal S1/S2.  ABDOMEN: Soft, nontender, nondistended, no organomegaly. Normoactive bowel sounds.  EXTREMITIES:  2+ peripheral pulses b/l, brisk capillary refill. No clubbing, cyanosis, or edema.  MSK: No gross deformities noted.   NEURO:  AAOx3, no focal deficits.   SKIN: No rashes or lesions.  PSYCH: Normal mood, affect.    TELEMETRY:    LABS:                        8.3    8.46  )-----------( 122      ( 21 Nov 2022 04:53 )             25.8     11-21    129<L>  |  84<L>  |  100<H>  ----------------------------<  97  5.6<H>   |  20<L>  |  13.97<H>    Ca    8.8      21 Nov 2022 04:53  Phos  5.4     11-21  Mg     2.3     11-21    TPro  7.5  /  Alb  3.4  /  TBili  0.8  /  DBili  x   /  AST  36  /  ALT  28  /  AlkPhos  146<H>  11-21        PT/INR - ( 21 Nov 2022 04:53 )   PT: 13.2 sec;   INR: 1.15 ratio         PTT - ( 21 Nov 2022 04:53 )  PTT:35.4 sec    Creatinine Trend: 13.97<--, 13.50<--, 12.50<--  I&O's Summary    20 Nov 2022 07:01  -  21 Nov 2022 07:00  --------------------------------------------------------  IN: 0 mL / OUT: 0 mL / NET: 0 mL      BNP    RADIOLOGY & ADDITIONAL STUDIES:             Internal Medicine   Romulo Church| PGY-1    OVERNIGHT EVENTS:  No acute events overnight    SUBJECTIVE:   Feels ok this am  Has shoulder and back pain  Is hungry Denies headaches, dizziness, weakness, SOB, abdominal pain    MEDICATIONS  (STANDING):  ceFAZolin   IVPB 1000 milliGRAM(s) IV Intermittent every 24 hours  coronavirus bivalent (EUA) Booster Vaccine (PFIZER) 0.3 milliLiter(s) IntraMuscular once  mirtazapine 15 milliGRAM(s) Oral at bedtime  PARoxetine 20 milliGRAM(s) Oral daily  risperiDONE   Tablet 2 milliGRAM(s) Oral daily  sevelamer carbonate 800 milliGRAM(s) Oral three times a day with meals  simvastatin 40 milliGRAM(s) Oral at bedtime    MEDICATIONS  (PRN):  acetaminophen     Tablet .. 650 milliGRAM(s) Oral every 6 hours PRN Temp greater or equal to 38C (100.4F), Mild Pain (1 - 3)  albuterol/ipratropium for Nebulization 3 milliLiter(s) Nebulizer every 6 hours PRN Shortness of Breath and/or Wheezing  HYDROmorphone   Tablet 2 milliGRAM(s) Oral every 4 hours PRN Severe Pain (7 - 10)        T(F): 98.7 (11-21-22 @ 06:22), Max: 100.4 (11-20-22 @ 23:11)  HR: 94 (11-21-22 @ 06:22) (72 - 102)  BP: 157/83 (11-21-22 @ 06:22) (111/56 - 157/83)  BP(mean): --  RR: 18 (11-21-22 @ 06:22) (15 - 20)  SpO2: 95% (11-21-22 @ 06:22) (94% - 100%)    PHYSICAL EXAM:     GENERAL: NAD, lying in bed comfortably.  HEAD:  Atraumatic, normocephalic.  EYES: EOMI, PERRLA, conjunctiva and sclera clear, no nystagmus noted.  ENT: Moist mucous membranes,   NECK: Supple. No JVD. Trachea midline.  CHEST/LUNG: CTAB. No rales, rhonchi, wheezing, or rubs. Unlabored respirations.  HEART: RRR, no M/R/G, normal S1/S2.  ABDOMEN: Soft, nontender, nondistended, no organomegaly. Normoactive bowel sounds.  EXTREMITIES:  2+ peripheral pulses b/l, brisk capillary refill. No clubbing, cyanosis, or edema. AVF noted on LUE, is indurated and erythematous, warm to touch, tender to palpation  MSK: No gross deformities noted.   NEURO:  AAOx3, no focal deficits.   SKIN: No rashes or lesions.  PSYCH: Normal mood, affect.    TELEMETRY:    LABS:                        8.3    8.46  )-----------( 122      ( 21 Nov 2022 04:53 )             25.8     11-21    129<L>  |  84<L>  |  100<H>  ----------------------------<  97  5.6<H>   |  20<L>  |  13.97<H>    Ca    8.8      21 Nov 2022 04:53  Phos  5.4     11-21  Mg     2.3     11-21    TPro  7.5  /  Alb  3.4  /  TBili  0.8  /  DBili  x   /  AST  36  /  ALT  28  /  AlkPhos  146<H>  11-21        PT/INR - ( 21 Nov 2022 04:53 )   PT: 13.2 sec;   INR: 1.15 ratio         PTT - ( 21 Nov 2022 04:53 )  PTT:35.4 sec    Creatinine Trend: 13.97<--, 13.50<--, 12.50<--  I&O's Summary    20 Nov 2022 07:01  -  21 Nov 2022 07:00  --------------------------------------------------------  IN: 0 mL / OUT: 0 mL / NET: 0 mL      BNP    RADIOLOGY & ADDITIONAL STUDIES:

## 2022-11-21 NOTE — H&P ADULT - PROBLEM SELECTOR PLAN 5
Hx of bipolar disorder  takes Risperidone at home  c/w home meds Hx of schizophrenia. Home medication risperidone 2mg rafael    - c/w home meds Hx of schizophrenia. Home medication risperidone 2mg daily, seroquel 300qhs    - c/w home meds Hx of schizophrenia. Home medication risperidone 2mg daily, seroquel 300qhs    - c/w home meds except Seroquel pending EKG QTc
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
## 2022-11-21 NOTE — PROGRESS NOTE ADULT - PROBLEM SELECTOR PLAN 3
Patient complete dialysis at home M/Tue/off Wed/Thur/ off Fri/ Sat/ Sun. AVF in place (prior hx of aneurysm per [facility] records); Last dialysis thurdsay 11/ 17. Unable to complete HD yesterday at [place] d/t increased bleeding and noted purulent fluid  O/p [facility] nephrology Dr. La (private)    - consult renal in the am  - pt should have CT followed by dialysis- please contact nephro for coordination   - dose cefazolin after dialysis Patient complete dialysis at home M/Tue/off Wed/Thur/ off Fri/ Sat/ Sun. AVF in place (prior hx of aneurysm per [facility] records); Last dialysis thurdsay 11/ 17. Unable to complete HD yesterday at [place] d/t increased bleeding and noted purulent fluid  O/p [facility] [place] Dr. La (private)    - dialysis 11/21 & 11/22 s/p Shiley placement

## 2022-11-21 NOTE — PHYSICAL THERAPY INITIAL EVALUATION ADULT - PERTINENT HX OF CURRENT PROBLEM, REHAB EVAL
Pt is a 42-year-old male with past medical history ESRD on HD (at home, M to F via AV graft), COPD on home oxygen 4L, bipolar disorder, schizophrenia presented to [place] fever and chills x1 day. Transferred to [facility] for continued MSSA bacteremia management and further imaging. Pt is a 42-year-old male with past medical history ESRD on HD (at home, M to F via AV graft), COPD on home oxygen 4L, bipolar disorder, schizophrenia presented to [place] fever and chills x1 day. Transferred to [facility] for continued MSSA bacteremia management and further imaging. Cervical spine CT 11/21/22: No evidence for discitis/osteomyelitis. Nonenhancing fluid collection within the prevertebral soft tissues. Thoracic spine CT 11/21/22: No evidence for discitis/osteomyelitis. Multiple patchy areas of increased density within the lungs likely representing pneumonia. Lumbar spine CT 11/21/22: No evidence for osteomyelitis/discitis. Chest/Abdomen/Pelvis CT 11/21/22: Multifocal pneumonia. Mild to moderate cardiomegaly. Partially visualized inflammation medial left upper extremity. Hepatosplenomegaly with cirrhosis. 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 Surrounding low-attenuation measuring up to 4.5 cm in thickness is suspicious for a fluid collection, versus mural thrombus of the aneurysm; consider targeted ultrasound to confirm this is fluid. Extensive surrounding subcutaneous infiltration which extends superiorly to the origin of the bypass graft. The nodular lung opacities are suspicious for septic emboli. L knee x-ray 11/21/22: No acute displaced fracture or dislocation. No definite knee joint effusion seen. Mild patellofemoral arthrosis. Atherosclerotic calcifications.

## 2022-11-21 NOTE — PROGRESS NOTE ADULT - SUBJECTIVE AND OBJECTIVE BOX
SUBJECTIVE  The patient was seen and examined.     OBJECTIVE  ___________________________________________________  VITAL SIGNS / I&O's   Vital Signs Last 24 Hrs  T(C): 36.7 (21 Nov 2022 08:57), Max: 38 (20 Nov 2022 23:11)  T(F): 98 (21 Nov 2022 08:57), Max: 100.4 (20 Nov 2022 23:11)  HR: 89 (21 Nov 2022 08:57) (72 - 102)  BP: 166/89 (21 Nov 2022 08:57) (111/56 - 166/89)  BP(mean): --  RR: 18 (21 Nov 2022 08:57) (15 - 20)  SpO2: 100% (21 Nov 2022 08:57) (94% - 100%)    Parameters below as of 21 Nov 2022 08:57  Patient On (Oxygen Delivery Method): room air  O2 Flow (L/min): 4        20 Nov 2022 07:01  -  21 Nov 2022 07:00  --------------------------------------------------------  IN:  Total IN: 0 mL    OUT:    Oral Fluid: 0 mL  Total OUT: 0 mL    Total NET: 0 mL        ___________________________________________________  PHYSICAL EXAM    -- CONSTITUTIONAL: NAD, lying in bed  -- NEURO: Awake, alert  Extremities: LUE AVG warm to touch, mildly edematous and TTP. Erythematous. Palpable pulses, thrill.    ___________________________________________________  LABS                        8.3    8.46  )-----------( 122      ( 21 Nov 2022 04:53 )             25.8     21 Nov 2022 04:53    129    |  84     |  100    ----------------------------<  97     5.6     |  20     |  13.97    Ca    8.8        21 Nov 2022 04:53  Phos  5.4       21 Nov 2022 04:53  Mg     2.3       21 Nov 2022 04:53    TPro  7.5    /  Alb  3.4    /  TBili  0.8    /  DBili  x      /  AST  36     /  ALT  28     /  AlkPhos  146    21 Nov 2022 04:53    PT/INR - ( 21 Nov 2022 04:53 )   PT: 13.2 sec;   INR: 1.15 ratio         PTT - ( 21 Nov 2022 04:53 )  PTT:35.4 sec  CAPILLARY BLOOD GLUCOSE              ___________________________________________________  MICRO  Recent Cultures:  Specimen Source: .Blood Blood-Peripheral, 11-19 @ 18:00; Results   Growth in aerobic bottle: Gram Positive Cocci in Clusters  Growth in anaerobic bottle: Gram Positive Cocci in Clusters; Gram Stain:   Growth in aerobic bottle: Gram Positive Cocci in Clusters  Growth in anaerobic bottle: Gram Positive Cocci in Clusters; Organism: --  Specimen Source: .Blood Blood-Peripheral, 11-19 @ 17:50; Results   Growth in aerobic and anaerobic bottles: Staphylococcus aureus  Susceptibility to follow.  ***Blood Panel PCR results on this specimen are available  approximately 3 hours after the Gram stain result.***  Gram stain, PCR, and/or culture results maynot always  correspond due to difference in methodologies.  ************************************************************  This PCR assay was performed by multiplex PCR. This  Assay tests for 66 bacterial and resistance gene targets.  Please refer to the [facility] test directory  at [email or web address] for details.; Gram Stain:   Growth in aerobic bottle: Gram Positive Cocci in Clusters  Growth in anaerobic bottle: Gram Positive Cocci in Clusters; Organism: Blood Culture PCR    ___________________________________________________  MEDICATIONS  (STANDING):  ceFAZolin   IVPB 1000 milliGRAM(s) IV Intermittent every 24 hours  coronavirus bivalent (EUA) Booster Vaccine (PFIZER) 0.3 milliLiter(s) IntraMuscular once  mirtazapine 15 milliGRAM(s) Oral at bedtime  PARoxetine 20 milliGRAM(s) Oral daily  risperiDONE   Tablet 2 milliGRAM(s) Oral daily  sevelamer carbonate 800 milliGRAM(s) Oral three times a day with meals  simvastatin 40 milliGRAM(s) Oral at bedtime    MEDICATIONS  (PRN):  acetaminophen     Tablet .. 650 milliGRAM(s) Oral every 6 hours PRN Temp greater or equal to 38C (100.4F), Mild Pain (1 - 3)  albuterol/ipratropium for Nebulization 3 milliLiter(s) Nebulizer every 6 hours PRN Shortness of Breath and/or Wheezing  HYDROmorphone   Tablet 2 milliGRAM(s) Oral every 4 hours PRN Severe Pain (7 - 10)

## 2022-11-21 NOTE — H&P ADULT - PROBLEM SELECTOR PLAN 4
In the setting of ESRD   pt takes no meds for BP   controlled       holding at this time due to normotensive with sepsis  will start as needed with parameters No home medications noted     - CTM q6h; concern for hypotension given sepsis Unclear medications- Metoprolol 50 BID and Procardia 30 (please verify with o/p pharmacy) - patient unsure    - verify with pharmacy

## 2022-11-21 NOTE — PROGRESS NOTE ADULT - PROBLEM SELECTOR PLAN 5
Hx of schizophrenia. Home medication risperidone 2mg daily, seroquel 300qhs    - c/w home meds except Seroquel pending EKG QTc Hx of schizophrenia. Home medication risperidone 2mg daily, seroquel 300qhs    - c/w home meds, QTc okay  - c/w Seroquel 300qhs

## 2022-11-21 NOTE — PHYSICAL THERAPY INITIAL EVALUATION ADULT - ACTIVE RANGE OF MOTION EXAMINATION, REHAB EVAL
L knee & L shoulder AAROM WFL, R shoulder flexion AAROM limited to 45 deg 2/2 pain./bilateral upper extremity Active ROM was WFL (within functional limits)/bilateral  lower extremity Active ROM was WFL (within functional limits)/deficits as listed below

## 2022-11-21 NOTE — H&P ADULT - PROBLEM SELECTOR PLAN 7
On ventolin, symbicort, duoneb  c/w all home meds,  hold duonebs as patient is not in exacerbation On ventolin, symbicort, duoneb    - c/w all home meds, On ventolin, symbicort, duoneb. On 4L NC at home    - c/w all home meds,

## 2022-11-21 NOTE — H&P ADULT - ATTENDING COMMENTS
Pt was seen and examined during key portion of E/M service. Case discussed with resident. H&P reviewed and edited where appropriate. Other than the following, I agree with the above history, exam, assessment, and plan.  42-year-old male with past medical history ESRD on HD (at home, M to F via AV graft), COPD on home oxygen 4L, bipolar disorder, schizophrenia presented to [place] fever and chills x1 day. Transferred to [facility] for continued MSSA bacteremia management and further imaging.  Needs ID and renal consult. empiric ancef for now. f/u repeat cultures. Vasc to place shiley and also to ?revise/remove graft? Pt was seen and examined during key portion of E/M service. Case discussed with resident. H&P reviewed and edited where appropriate. Other than the following, I agree with the above history, exam, assessment, and plan.  42-year-old male with past medical history ESRD on HD (at home, M to F via AV graft), COPD on home oxygen 4L, bipolar disorder, schizophrenia presented to [place] fever and chills x1 day. Transferred to [facility] for continued MSSA bacteremia management and further imaging.  Needs ID and renal consult. cont ancef for now. f/u repeat cultures. Vasc to place shiley and also to ?revise/remove graft? TTE pending
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
## 2022-11-21 NOTE — H&P ADULT - NSHPPHYSICALEXAM_GEN_ALL_CORE
Vital Signs Last 24 Hrs  T(C): 37.4 (21 Nov 2022 01:19), Max: 38 (20 Nov 2022 23:11)  T(F): 99.4 (21 Nov 2022 01:19), Max: 100.4 (20 Nov 2022 23:11)  HR: 100 (21 Nov 2022 01:19) (94 - 102)  BP: 134/73 (21 Nov 2022 01:19) (100/49 - 148/86)  BP(mean): --  RR: 18 (21 Nov 2022 01:19) (18 - 20)  SpO2: 97% (21 Nov 2022 01:19) (97% - 100%)    Parameters below as of 21 Nov 2022 01:19  Patient On (Oxygen Delivery Method): nasal cannula  O2 Flow (L/min): 4      CONSTITUTIONAL: NAD; comfortable lying in bed- c- spine in place; morbidly obese  EYES: No conjunctival or scleral injection, non-icteric; PERRLA and symmetric  ENMT: No external nasal lesions; oral mucosa with moist membranes  NECK: c- spine collar in place  RESPIRATORY: Breathing comfortably on 4L NC (baseline) lungs CTA without wheeze/rhonchi/rales  CARDIOVASCULAR: +S1S2, RRR, no M/G/R;  no lower extremity edema  GASTROINTESTINAL: large body habitus, soft, nondistended and nontender, +BS throughout, no rebound/guarding  MUSCULOSKELETAL: decreased strength of LUE/LLE d/t pain  SKIN: No rashes; AVF LUE - midly erythemaous with bleeding; no pus noted  NEUROLOGIC: decreased strength and sensation of LUE d/t pain  PSYCHIATRIC: A+O x 3; mood and affect appropriate Vital Signs Last 24 Hrs  T(C): 37.4 (21 Nov 2022 01:19), Max: 38 (20 Nov 2022 23:11)  T(F): 99.4 (21 Nov 2022 01:19), Max: 100.4 (20 Nov 2022 23:11)  HR: 100 (21 Nov 2022 01:19) (94 - 102)  BP: 134/73 (21 Nov 2022 01:19) (100/49 - 148/86)  BP(mean): --  RR: 18 (21 Nov 2022 01:19) (18 - 20)  SpO2: 97% (21 Nov 2022 01:19) (97% - 100%)    Parameters below as of 21 Nov 2022 01:19  Patient On (Oxygen Delivery Method): nasal cannula  O2 Flow (L/min): 4      CONSTITUTIONAL: NAD; comfortable lying in bed- c- spine in place; morbidly obese  EYES: No conjunctival or scleral injection, non-icteric; PERRLA and symmetric  ENMT: No external nasal lesions; oral mucosa with moist membranes  NECK: c- spine collar in place  RESPIRATORY: Breathing comfortably on 4L NC (baseline) lungs CTA without wheeze/rhonchi/rales  CARDIOVASCULAR: +S1S2, RRR, no M/G/R;  no lower extremity edema  GASTROINTESTINAL: large body habitus, soft, nondistended and nontender, +BS throughout, no rebound/guarding  MUSCULOSKELETAL: decreased strength of LUE/LLE d/t pain  SKIN: No rashes; AVF LUE - midly erythemaous with bleeding; non purulent  NEUROLOGIC: decreased strength and sensation of LUE d/t pain  PSYCHIATRIC: A+O x 3; mood and affect appropriate Vital Signs Last 24 Hrs  T(C): 37.4 (21 Nov 2022 01:19), Max: 38 (20 Nov 2022 23:11)  T(F): 99.4 (21 Nov 2022 01:19), Max: 100.4 (20 Nov 2022 23:11)  HR: 100 (21 Nov 2022 01:19) (94 - 102)  BP: 134/73 (21 Nov 2022 01:19) (100/49 - 148/86)  BP(mean): --  RR: 18 (21 Nov 2022 01:19) (18 - 20)  SpO2: 97% (21 Nov 2022 01:19) (97% - 100%)    Parameters below as of 21 Nov 2022 01:19  Patient On (Oxygen Delivery Method): nasal cannula  O2 Flow (L/min): 4    CONSTITUTIONAL: NAD; comfortable lying in bed- c- spine in place; morbidly obese  EYES: No conjunctival or scleral injection, non-icteric; PERRLA and symmetric  ENMT: No external nasal lesions; oral mucosa with moist membranes  NECK: c- spine collar in place  RESPIRATORY: Breathing comfortably on 4L NC (baseline) lungs CTA without wheeze/rhonchi/rales  CARDIOVASCULAR: +S1S2, RRR, no M/G/R;  no lower extremity edema  GASTROINTESTINAL: large body habitus, soft, nondistended and nontender, +BS throughout, no rebound/guarding  MUSCULOSKELETAL: decreased strength of LUE/LLE d/t pain  SKIN: No rashes; AVF LUE - midly erythemaous with bleeding; non purulent  NEUROLOGIC: decreased strength and sensation of LUE d/t pain  PSYCHIATRIC: A+O x 3; mood and affect appropriate

## 2022-11-21 NOTE — PROGRESS NOTE ADULT - ASSESSMENT
42-year-old male with past medical history ESRD on HD (at home, M to F via AV graft), COPD on home oxygen 4L, bipolar disorder, schizophrenia presented to [place] fever and chills x1 day. Transferred to [facility] for continued MSSA bacteremia management and further imaging.

## 2022-11-21 NOTE — PHYSICAL THERAPY INITIAL EVALUATION ADULT - CRITERIA FOR SKILLED THERAPEUTIC INTERVENTIONS
impairments found Wound measuring: 10.0cm x 8.0cm x 4.5cm with sutures intact proximally and distally. Wound with combination pale pink/adipose tissue with dusky coagulated blood noted. Cleansed with NS, pad dry with gauze, cavilon to periwound, adaptic non-adherent to protect sutures followed by black foam to base with good seal noted 125mmHg continuous, ACE wrap in figure-8 fashion with 50% tension/50% overlap. UE digits 1-5 with <3 sec cap refill after ACE application. Pt left as found in NAD, call bell/possessions in reach all needs met and lines intact, 5P's addressed, VSS, RN Janee aware of VAC application./impairments found/functional limitations in following categories/rehab potential/therapy frequency/predicted duration of therapy intervention/anticipated equipment needs at discharge/anticipated discharge recommendation

## 2022-11-21 NOTE — H&P ADULT - PROBLEM SELECTOR PLAN 6
Hx of schizophrenia  takes risperidone at home  c/w home meds Hx of bipolar disorder. Home medication: Risperidone 2mg daily    - c/w home medication Hx of bipolar disorder. Home medication: Risperidone 2mg daily, seroquel 300qhs, remeron 15 qhs    - c/w home medication Hx of bipolar disorder. Home medication: Risperidone 2mg daily, seroquel 300qhs, remeron 15 qhs    - c/w home meds except Seroquel pending EKG QTc

## 2022-11-21 NOTE — H&P ADULT - ASSESSMENT
42-year-old male with past medical history diabetes, ESRD on HD (at home Monday through Friday), COPD on home oxygen 4L, bipolar disorder, schizophrenia presented to [place] fever and chills x1 day. Transferred to [facility] for continued MSSA bacteremia management and further imaging. 42-year-old male with past medical history ESRD on HD (AVF), COPD on home oxygen 4L, bipolar disorder, schizophrenia presented to [place] fever and chills x1 day. Transferred to [facility] for continued MSSA bacteremia management and further imaging. 42-year-old male with past medical history ESRD on HD (at home, M to F via AV graft), COPD on home oxygen 4L, bipolar disorder, schizophrenia presented to [place] fever and chills x1 day. Transferred to [facility] for continued MSSA bacteremia management and further imaging.
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
## 2022-11-21 NOTE — PHYSICAL THERAPY INITIAL EVALUATION ADULT - GENERAL OBSERVATIONS, REHAB EVAL
Pt received semisupine in bed with +IVL and +4LNC. NAD noted. Pt received semisupine in bed with +IVL, +PCA pump in progress, +4L supplemental O2 via NC, VSS

## 2022-11-21 NOTE — PROGRESS NOTE ADULT - PROBLEM SELECTOR PLAN 1
Found to have MSSA bacteremia 2/2 possible AVF infection d/t noted purulence. Transferred to [facility] for further imaging. S/p empiric treatment with vanc (11/19) and cefepime (11/19). Continued on ancef. Leukocytosis improved. LUE negative for findings of DVT.     -blood culture - aerobic gram positive in x4 bottles  - repeat blood cultures in the am   - Tylenol 650 PRN  - c/w Ancef 11/20- (s/p vanc (11/19- 20), cefepime (11/19))  - pending CT C/A/P w/w/o IV contrast - r/o discitis, OM  - consult ID  - f/u with vascular surgery- plan to place shiley? Found to have MSSA bacteremia 2/2 possible AVF infection d/t noted purulence. Transferred to [facility] for further imaging. S/p empiric treatment with vanc (11/19) and cefepime (11/19). Continued on ancef. Leukocytosis improved. LUE negative for findings of DVT.     -blood culture - aerobic gram positive in x4 bottles  - Tylenol 650 PRN  - c/w Ancef 11/20- (s/p vanc (11/19- 20), cefepime (11/19))  - pending CT C/A/P w/w/o IV contrast - r/o discitis, OM  - ID consulted  - s/p taiwo

## 2022-11-21 NOTE — PATIENT PROFILE ADULT - FALL HARM RISK - FACTORS
Impaired gait/IV and/or equipment tethered to patient/Medication side effects/Poor balance/Weakness/Other

## 2022-11-21 NOTE — PHYSICAL THERAPY INITIAL EVALUATION ADULT - PLANNED THERAPY INTERVENTIONS, PT EVAL
GOAL: Stair Negotiation Training: Patient will be able to negotiate up & down 1 flight of stairs with unilateral rail, step to gait pattern, in 4 weeks./balance training/bed mobility training/gait training/strengthening/transfer training GOAL: Stair Negotiation Training: Patient will be able to negotiate up & down 1 flight of stairs with unilateral rail, step to gait pattern, in 4 weeks./GOAL: WC MANAGEMENT/balance training/bed mobility training/gait training/strengthening/transfer training

## 2022-11-21 NOTE — H&P ADULT - PROBLEM SELECTOR PLAN 1
Found to have MSSA bacteremia 2/2 possible AVF infection. Transferred to [facility] for further imaging. S/p empiric treatment with vanc (11/19) and cefepime (11/19). Contined on ancef. Leukocytosis improved. LUE negative for findings of DVT.     -blood culture - aerobic gram positive in 2 bottles  - repeat blood cultures in the am   - Tylenol 650 PRN Found to have MSSA bacteremia 2/2 possible AVF infection d/t noted purulence. Transferred to [facility] for further imaging. S/p empiric treatment with vanc (11/19) and cefepime (11/19). Continued on ancef. Leukocytosis improved. LUE negative for findings of DVT.     -blood culture - aerobic gram positive in 2 bottles  - repeat blood cultures in the am   - Tylenol 650 PRN  - c/w Ancef 11/20- (s/p vanc (11/19- 20), cefepime (11/19))  - pending CT C/A/P w/w/o IV contrast Found to have MSSA bacteremia 2/2 possible AVF infection d/t noted purulence. Transferred to [facility] for further imaging. S/p empiric treatment with vanc (11/19) and cefepime (11/19). Continued on ancef. Leukocytosis improved. LUE negative for findings of DVT.     -blood culture - aerobic gram positive in x4 bottles  - repeat blood cultures in the am   - Tylenol 650 PRN  - c/w Ancef 11/20- (s/p vanc (11/19- 20), cefepime (11/19))  - pending CT C/A/P w/w/o IV contrast - r/o discitis, OM  - consult ID  - f/u with vascular surgery- plan to place shiley?

## 2022-11-21 NOTE — H&P ADULT - PROBLEM SELECTOR PLAN 3
Patient gets dialysis M-F  via left BC fistula   compliant with schedule  last session yesterday -partial  labs stable per ERSD  c/w dialysis  Dr. La consulted Patient gets dialysis MWF; left BC fistula; reciving HD [place]    - consult renal in the am  - dose cefazolin after dialysis Patient complete dialysis at home M/Tue/off Wed/Thur/ off Fri/ Sat/ Sun. AVF in place (prior hx of aneurysm per [facility] records); Last dialysis thurdsay 11/ 17. Unable to complete HD yesterday at [place] d/t increased bleeding and noted purulent fluid  O/p nephrology Dr. La (private)    - consult renal in the am  - pt should have CT followed by dialysis- please contact nephro for coordination   - dose cefazolin after dialysis Patient complete dialysis at home M/Tue/off Wed/Thur/ off Fri/ Sat/ Sun. AVF in place (prior hx of aneurysm per [facility] records); Last dialysis thurdsay 11/ 17. Unable to complete HD yesterday at [place] d/t increased bleeding and noted purulent fluid  O/p [facility] nephrology Dr. La (private)    - consult renal in the am  - pt should have CT followed by dialysis- please contact nephro for coordination   - dose cefazolin after dialysis

## 2022-11-21 NOTE — H&P ADULT - PROBLEM SELECTOR PLAN 8
Morbid obesity address  patient encouraged to assume life style modifications - Diet: DVT ppx  - DVT: heparin subQ - Diet: NPO after midnight  - DVT: heparin subQ - Diet: NPO after midnight  - DVT: heparin subQ  - PT

## 2022-11-21 NOTE — CONSULT NOTE ADULT - SUBJECTIVE AND OBJECTIVE BOX
Interventional Radiology    Evaluate for Procedure:   shiley for urgent HD    HPI:  42-year-old male with past medical history ESRD on HD (at home, M to F via Left AV graft), COPD on home oxygen 4L, bipolar disorder, schizophrenia presents with fever and chills x1 day.   Now with MSSA bacteremia. IR consulted on 11/21 for need of Shiley for emergent HD.        Allergies: aspirin (Swelling)  penicillins (Swelling)    Medications (Abx/Cardiac/Anticoagulation/Blood Products)    ceFAZolin   IVPB: 100 mL/Hr IV Intermittent (11-20 @ 16:25)  ceFAZolin   IVPB: 100 mL/Hr IV Intermittent (11-21 @ 05:30)  cefepime   IVPB: 100 mL/Hr IV Intermittent (11-19 @ 19:45)  clopidogrel Tablet: 75 milliGRAM(s) Oral (11-20 @ 12:12)  heparin   Injectable: 7500 Unit(s) SubCutaneous (11-20 @ 13:48)  heparin   Injectable: 5000 Unit(s) SubCutaneous (11-20 @ 05:27)  vancomycin  IVPB: 250 mL/Hr IV Intermittent (11-20 @ 12:12)  vancomycin  IVPB.: 250 mL/Hr IV Intermittent (11-19 @ 19:45)    Data:  200.7  196.6  T(C): 36.7  HR: 89  BP: 166/89  RR: 18  SpO2: 100%    -WBC 8.46 / HgB 8.3 / Hct 25.8 / Plt 122  -Na 129 / Cl 84 /  / Glucose 97  -K 5.6 / CO2 20 / Cr 13.97  -ALT 28 / Alk Phos 146 / T.Bili 0.8  -INR 1.15 / PTT 35.4    Radiology:   reviewed    Assessment/Plan:   42-year-old male with past medical history ESRD on HD (at home, M to F via Left AV graft), COPD on home oxygen 4L, bipolar disorder, schizophrenia presents with fever and chills x1 day.   Now with MSSA bacteremia. IR consulted on 11/21 for need of Shiley for emergent HD.        - case reviewed and approved for non tunneled cvc for HD for today 11/21  - please place IR procedure order under RADHA Steen   - Current (cbc,coags, bmp, T&S)  - hold AC    - PT does not need to be npo   - COVID PCR results within 5 days of planned procedure  - d/w primary team

## 2022-11-21 NOTE — CONSULT NOTE ADULT - ASSESSMENT
42-year-old male with past medical history ESRD on HD (at home, M to F via AV graft), COPD on home oxygen 4L, bipolar disorder, schizophrenia presents with fever and chills x1 day. Patient states he had a fever of up to 104 at home and in [place] ED was Tmax 100.6    ESRD on HD  Infected access,  cannulation attempted at [place] and they teresa pus  Last HD was 11/17/22  Needs Shiley placement ASAP, noted hyperkalemic today  Scheduled for HD today after access placed  Appreciate Infectious disease specialist recs for Abx  Renal diet  Daily BMP    HTN  UF on HD  resume home BP med regimen    Anemia of CKD  Epo during HD    BMD of CKD  Phos level QD  resume home binder regimen      Thank you for allowing me to participate in the care of your patient    For any question, call:  Cell # [phone]  Pager # [phone]  Callback # [phone]

## 2022-11-21 NOTE — H&P ADULT - NSHPLABSRESULTS_GEN_ALL_CORE
(11-20 @ 05:48)                      8.5  8.75 )-----------( 112                 25.7    Neutrophils = 7.53 (70.0%)  Lymphocytes = 0.53 (6.0%)  Eosinophils = 0.00 (0.0%)  Basophils = 0.00 (0.0%)  Monocytes = 0.70 (8.0%)  Bands = 16.0%    11-20    130<L>  |  93<L>  |  90<H>  ----------------------------<  182<H>  4.8   |  24  |  13.50<H>    Ca    8.3<L>      20 Nov 2022 05:48  Phos  4.2     11-19  Mg     2.1     11-19    TPro  7.2  /  Alb  2.6<L>  /  TBili  1.1  /  DBili  x   /  AST  46<H>  /  ALT  36  /  AlkPhos  126<H>  11-20    ( 19 Nov 2022 18:07 )   PT: 14.6 sec;   INR: 1.22 ratio;       PTT:32.2 sec      RVP:(11-19 @ 17:44)  NotDetec            Tox: Personally reviewed imaging, labs.    (11-20 @ 05:48)                      8.5  8.75 )-----------( 112                 25.7    Neutrophils = 7.53 (70.0%)  Lymphocytes = 0.53 (6.0%)  Eosinophils = 0.00 (0.0%)  Basophils = 0.00 (0.0%)  Monocytes = 0.70 (8.0%)  Bands = 16.0%    11-20    130<L>  |  93<L>  |  90<H>  ----------------------------<  182<H>  4.8   |  24  |  13.50<H>    Ca    8.3<L>      20 Nov 2022 05:48  Phos  4.2     11-19  Mg     2.1     11-19    TPro  7.2  /  Alb  2.6<L>  /  TBili  1.1  /  DBili  x   /  AST  46<H>  /  ALT  36  /  AlkPhos  126<H>  11-20    ( 19 Nov 2022 18:07 )   PT: 14.6 sec;   INR: 1.22 ratio;       PTT:32.2 sec      RVP:(11-19 @ 17:44)  NotDetec            Tox:

## 2022-11-21 NOTE — PHYSICAL THERAPY INITIAL EVALUATION ADULT - ADDITIONAL COMMENTS
Pt lives with his mother in a apt with +elevator & steps. Pt states his mother is available to assist when needed.
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
## 2022-11-21 NOTE — PATIENT PROFILE ADULT - FALL HARM RISK - HARM RISK INTERVENTIONS
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
## 2022-11-21 NOTE — PROGRESS NOTE ADULT - ATTENDING COMMENTS
42-year-old male with past medical history ESRD on HD (at home, M to F via AV graft), COPD on home oxygen 4L, bipolar disorder, schizophrenia presented to [place] fever and chills x1 day. Transferred to [facility] for continued MSSA bacteremia management and further imaging.    #MSSA bacteremia  #ESRD w/ infected AVF  #Schizophrenia  #R/O Discitis/OM    -c/w ancef, f/u repeat cultures, echo  -CT of LUE to eval for infection of graft, vascular following  -Shiley to be placed by IR today w/ HD after. HD also ordered for tomorrow, renal following  -CT C/T/L to look for OM, inflammatory markers markedly elevated raising suspicion for OM/discitis, ID consulted  -med rec, restart seroquel as QTc<500    d/w HS 1

## 2022-11-21 NOTE — PROGRESS NOTE ADULT - PROBLEM SELECTOR PLAN 6
Hx of bipolar disorder. Home medication: Risperidone 2mg daily, seroquel 300qhs, remeron 15 qhs    - c/w home meds except Seroquel pending EKG QTc Hx of bipolar disorder. Home medication: Risperidone 2mg daily, seroquel 300qhs, remeron 15 qhs    - c/w risperidone, seroquel, remeron

## 2022-11-21 NOTE — PROGRESS NOTE ADULT - PROBLEM SELECTOR PLAN 4
Unclear medications- Metoprolol 50 BID and Procardia 30 (please verify with o/p pharmacy) - patient unsure    - verify with pharmacy

## 2022-11-22 ENCOUNTER — TRANSCRIPTION ENCOUNTER (OUTPATIENT)
Age: 42
End: 2022-11-22

## 2022-11-22 LAB
ALBUMIN SERPL ELPH-MCNC: 3.2 G/DL — LOW (ref 3.3–5)
ALP SERPL-CCNC: 179 U/L — HIGH (ref 40–120)
ALT FLD-CCNC: 17 U/L — SIGNIFICANT CHANGE UP (ref 10–45)
ANION GAP SERPL CALC-SCNC: 22 MMOL/L — HIGH (ref 5–17)
AST SERPL-CCNC: 25 U/L — SIGNIFICANT CHANGE UP (ref 10–40)
BASOPHILS # BLD AUTO: 0 K/UL — SIGNIFICANT CHANGE UP (ref 0–0.2)
BASOPHILS NFR BLD AUTO: 0 % — SIGNIFICANT CHANGE UP (ref 0–2)
BILIRUB SERPL-MCNC: 0.6 MG/DL — SIGNIFICANT CHANGE UP (ref 0.2–1.2)
BUN SERPL-MCNC: 76 MG/DL — HIGH (ref 7–23)
CALCIUM SERPL-MCNC: 8.7 MG/DL — SIGNIFICANT CHANGE UP (ref 8.4–10.5)
CHLORIDE SERPL-SCNC: 87 MMOL/L — LOW (ref 96–108)
CO2 SERPL-SCNC: 19 MMOL/L — LOW (ref 22–31)
CREAT SERPL-MCNC: 10.5 MG/DL — HIGH (ref 0.5–1.3)
EGFR: 6 ML/MIN/1.73M2 — LOW
EOSINOPHIL # BLD AUTO: 0 K/UL — SIGNIFICANT CHANGE UP (ref 0–0.5)
EOSINOPHIL NFR BLD AUTO: 0 % — SIGNIFICANT CHANGE UP (ref 0–6)
GLUCOSE SERPL-MCNC: 151 MG/DL — HIGH (ref 70–99)
GRAM STN FLD: SIGNIFICANT CHANGE UP
HCT VFR BLD CALC: 24.7 % — LOW (ref 39–50)
HGB BLD-MCNC: 8.1 G/DL — LOW (ref 13–17)
LYMPHOCYTES # BLD AUTO: 0.59 K/UL — LOW (ref 1–3.3)
LYMPHOCYTES # BLD AUTO: 6.1 % — LOW (ref 13–44)
MAGNESIUM SERPL-MCNC: 2.5 MG/DL — SIGNIFICANT CHANGE UP (ref 1.6–2.6)
MANUAL SMEAR VERIFICATION: SIGNIFICANT CHANGE UP
MCHC RBC-ENTMCNC: 32.4 PG — SIGNIFICANT CHANGE UP (ref 27–34)
MCHC RBC-ENTMCNC: 32.8 GM/DL — SIGNIFICANT CHANGE UP (ref 32–36)
MCV RBC AUTO: 98.8 FL — SIGNIFICANT CHANGE UP (ref 80–100)
METAMYELOCYTES # FLD: 0.9 % — HIGH (ref 0–0)
METHOD TYPE: SIGNIFICANT CHANGE UP
MONOCYTES # BLD AUTO: 0.85 K/UL — SIGNIFICANT CHANGE UP (ref 0–0.9)
MONOCYTES NFR BLD AUTO: 8.8 % — SIGNIFICANT CHANGE UP (ref 2–14)
MSSA DNA SPEC QL NAA+PROBE: SIGNIFICANT CHANGE UP
NEUTROPHILS # BLD AUTO: 8.09 K/UL — HIGH (ref 1.8–7.4)
NEUTROPHILS NFR BLD AUTO: 84.2 % — HIGH (ref 43–77)
PHOSPHATE SERPL-MCNC: 5.5 MG/DL — HIGH (ref 2.5–4.5)
PLAT MORPH BLD: NORMAL — SIGNIFICANT CHANGE UP
PLATELET # BLD AUTO: 138 K/UL — LOW (ref 150–400)
POTASSIUM SERPL-MCNC: 5.3 MMOL/L — SIGNIFICANT CHANGE UP (ref 3.5–5.3)
POTASSIUM SERPL-SCNC: 5.3 MMOL/L — SIGNIFICANT CHANGE UP (ref 3.5–5.3)
PROT SERPL-MCNC: 8 G/DL — SIGNIFICANT CHANGE UP (ref 6–8.3)
RBC # BLD: 2.5 M/UL — LOW (ref 4.2–5.8)
RBC # FLD: 14.6 % — HIGH (ref 10.3–14.5)
RBC BLD AUTO: SIGNIFICANT CHANGE UP
SODIUM SERPL-SCNC: 128 MMOL/L — LOW (ref 135–145)
SPECIMEN SOURCE: SIGNIFICANT CHANGE UP
SPECIMEN SOURCE: SIGNIFICANT CHANGE UP
WBC # BLD: 9.61 K/UL — SIGNIFICANT CHANGE UP (ref 3.8–10.5)
WBC # FLD AUTO: 9.61 K/UL — SIGNIFICANT CHANGE UP (ref 3.8–10.5)

## 2022-11-22 PROCEDURE — 99231 SBSQ HOSP IP/OBS SF/LOW 25: CPT

## 2022-11-22 PROCEDURE — 99222 1ST HOSP IP/OBS MODERATE 55: CPT

## 2022-11-22 PROCEDURE — 76705 ECHO EXAM OF ABDOMEN: CPT | Mod: 26

## 2022-11-22 PROCEDURE — 99233 SBSQ HOSP IP/OBS HIGH 50: CPT | Mod: GC

## 2022-11-22 RX ORDER — CEFAZOLIN SODIUM 1 G
1000 VIAL (EA) INJECTION EVERY 24 HOURS
Refills: 0 | Status: DISCONTINUED | OUTPATIENT
Start: 2022-11-22 | End: 2022-11-23

## 2022-11-22 RX ORDER — VANCOMYCIN HCL 1 G
2000 VIAL (EA) INTRAVENOUS
Refills: 0 | Status: DISCONTINUED | OUTPATIENT
Start: 2022-11-22 | End: 2022-11-22

## 2022-11-22 RX ADMIN — CHLORHEXIDINE GLUCONATE 1 APPLICATION(S): 213 SOLUTION TOPICAL at 05:40

## 2022-11-22 RX ADMIN — HYDROMORPHONE HYDROCHLORIDE 2 MILLIGRAM(S): 2 INJECTION INTRAMUSCULAR; INTRAVENOUS; SUBCUTANEOUS at 09:07

## 2022-11-22 RX ADMIN — HYDROMORPHONE HYDROCHLORIDE 2 MILLIGRAM(S): 2 INJECTION INTRAMUSCULAR; INTRAVENOUS; SUBCUTANEOUS at 08:07

## 2022-11-22 RX ADMIN — Medication 650 MILLIGRAM(S): at 02:12

## 2022-11-22 RX ADMIN — HYDROMORPHONE HYDROCHLORIDE 2 MILLIGRAM(S): 2 INJECTION INTRAMUSCULAR; INTRAVENOUS; SUBCUTANEOUS at 23:53

## 2022-11-22 RX ADMIN — SEVELAMER CARBONATE 800 MILLIGRAM(S): 2400 POWDER, FOR SUSPENSION ORAL at 08:08

## 2022-11-22 RX ADMIN — SEVELAMER CARBONATE 800 MILLIGRAM(S): 2400 POWDER, FOR SUSPENSION ORAL at 12:01

## 2022-11-22 RX ADMIN — HYDROMORPHONE HYDROCHLORIDE 2 MILLIGRAM(S): 2 INJECTION INTRAMUSCULAR; INTRAVENOUS; SUBCUTANEOUS at 12:30

## 2022-11-22 RX ADMIN — RISPERIDONE 2 MILLIGRAM(S): 4 TABLET ORAL at 12:01

## 2022-11-22 RX ADMIN — SIMVASTATIN 40 MILLIGRAM(S): 20 TABLET, FILM COATED ORAL at 21:25

## 2022-11-22 RX ADMIN — MIRTAZAPINE 15 MILLIGRAM(S): 45 TABLET, ORALLY DISINTEGRATING ORAL at 21:25

## 2022-11-22 RX ADMIN — Medication 100 MILLIGRAM(S): at 21:26

## 2022-11-22 RX ADMIN — Medication 20 MILLIGRAM(S): at 12:02

## 2022-11-22 RX ADMIN — HYDROMORPHONE HYDROCHLORIDE 2 MILLIGRAM(S): 2 INJECTION INTRAMUSCULAR; INTRAVENOUS; SUBCUTANEOUS at 12:00

## 2022-11-22 RX ADMIN — Medication 650 MILLIGRAM(S): at 01:42

## 2022-11-22 RX ADMIN — Medication 250 MILLIGRAM(S): at 04:30

## 2022-11-22 RX ADMIN — Medication 650 MILLIGRAM(S): at 13:11

## 2022-11-22 NOTE — PROGRESS NOTE ADULT - SUBJECTIVE AND OBJECTIVE BOX
[facility] - HUSSAIN Sanchez / HUSSAIN Villalobos / ERNESTO Mehta/ HUSSAIN Mauricio/ HUSSAIN Barber/ IVONNE Hui / JADEN La / BRANDON Alexander  ---------------------------------------------------------------------------------------------------------------  seen and examined today for ESRD  Interval : LUE AVF discomfort  VITALS:  T(F): 98.1 (11-22-22 @ 14:08), Max: 101.7 (11-22-22 @ 01:42)  HR: 91 (11-22-22 @ 14:08)  BP: 166/82 (11-22-22 @ 14:08)  RR: 18 (11-22-22 @ 14:08)  SpO2: 99% (11-22-22 @ 14:08)  Wt(kg): --    11-21 @ 07:01  -  11-22 @ 07:00  --------------------------------------------------------  IN: 1460 mL / OUT: 4000 mL / NET: -2540 mL    11-22 @ 07:01  -  11-22 @ 14:27  --------------------------------------------------------  IN: 260 mL / OUT: 0 mL / NET: 260 mL      Physical Exam :-  Constitutional: NAD  Neck: Supple.  Respiratory: Bilateral equal breath sounds,  Cardiovascular: S1, S2 normal,  Gastrointestinal: Bowel Sounds present, soft, non tender.  Extremities: No edema, erythema over LUE AVF  Neurological: Alert and Oriented x 3, no focal deficits  Psychiatric: Normal mood, normal affect  Data:-  Allergies :   aspirin (Swelling)  fish (Unknown)  penicillins (Swelling)  shellfish (Rash)    Hospital Medications:   MEDICATIONS  (STANDING):  ceFAZolin   IVPB 1000 milliGRAM(s) IV Intermittent every 24 hours  chlorhexidine 4% Liquid 1 Application(s) Topical <User Schedule>  coronavirus bivalent (EUA) Booster Vaccine (PFIZER) 0.3 milliLiter(s) IntraMuscular once  epoetin cyndie-epbx (RETACRIT) Injectable 95771 Unit(s) IV Push <User Schedule>  mirtazapine 15 milliGRAM(s) Oral at bedtime  PARoxetine 20 milliGRAM(s) Oral daily  risperiDONE   Tablet 2 milliGRAM(s) Oral daily  sevelamer carbonate 800 milliGRAM(s) Oral three times a day with meals  simvastatin 40 milliGRAM(s) Oral at bedtime    11-22    128<L>  |  87<L>  |  76<H>  ----------------------------<  151<H>  5.3   |  19<L>  |  10.50<H>    Ca    8.7      22 Nov 2022 09:22  Phos  5.5     11-22  Mg     2.5     11-22    TPro  8.0  /  Alb  3.2<L>  /  TBili  0.6  /  DBili      /  AST  25  /  ALT  17  /  AlkPhos  179<H>  11-22    Creatinine Trend: 10.50 <--, 13.97 <--, 13.50 <--, 12.50 <--                        8.3    8.46  )-----------( 122      ( 21 Nov 2022 04:53 )             25.8

## 2022-11-22 NOTE — PROGRESS NOTE ADULT - ASSESSMENT
42-year-old male with past medical history ESRD on HD (at home, M to F via AV graft), COPD on home oxygen 4L, bipolar disorder, schizophrenia presented to [facility] fever and chills x1 day. Transferred to [facility] for continued MSSA bacteremia management and further imaging.

## 2022-11-22 NOTE — PROGRESS NOTE ADULT - PROBLEM SELECTOR PLAN 7
On ventolin, symbicort, duoneb. On 4L NC at home    - c/w all home meds, On ventolin, symbicort, duoneb. On 4L NC at home    - c/w all home meds

## 2022-11-22 NOTE — PROGRESS NOTE ADULT - SUBJECTIVE AND OBJECTIVE BOX
GENERAL SURGERY PROGRESS NOTE    SUBJECTIVE  Patient seen and examined. No acute events overnight. Reports tolerating diet without nausea, vomiting, passing flatus, having bowel movements, voiding without issues, have been ambulating and out of bed. Denies fever, chills, SOB, chest pain.         OBJECTIVE    PHYSICAL EXAM  General: Appears well, NAD  CHEST: breathing comfortably  CV: appears well perfused  Abdomen: soft, nontender, nondistended, no rebound or guarding  Extremities: Grossly symmetric    T(C): 36.9 (11-22-22 @ 09:16), Max: 38.7 (11-22-22 @ 01:42)  HR: 90 (11-22-22 @ 09:16) (82 - 103)  BP: 166/92 (11-22-22 @ 09:16) (155/86 - 195/100)  RR: 18 (11-22-22 @ 09:16) (16 - 18)  SpO2: 99% (11-22-22 @ 09:16) (96% - 99%)    11-21-22 @ 07:01  -  11-22-22 @ 07:00  --------------------------------------------------------  IN: 1460 mL / OUT: 4000 mL / NET: -2540 mL    11-22-22 @ 07:01  -  11-22-22 @ 12:30  --------------------------------------------------------  IN: 260 mL / OUT: 0 mL / NET: 260 mL        MEDICATIONS  acetaminophen     Tablet .. 650 milliGRAM(s) Oral every 6 hours PRN  albuterol/ipratropium for Nebulization 3 milliLiter(s) Nebulizer every 6 hours PRN  ceFAZolin   IVPB 1000 milliGRAM(s) IV Intermittent every 24 hours  chlorhexidine 4% Liquid 1 Application(s) Topical <User Schedule>  coronavirus bivalent (EUA) Booster Vaccine (PFIZER) 0.3 milliLiter(s) IntraMuscular once  epoetin cyndie-epbx (RETACRIT) Injectable 11915 Unit(s) IV Push <User Schedule>  HYDROmorphone   Tablet 2 milliGRAM(s) Oral every 4 hours PRN  mirtazapine 15 milliGRAM(s) Oral at bedtime  PARoxetine 20 milliGRAM(s) Oral daily  risperiDONE   Tablet 2 milliGRAM(s) Oral daily  sevelamer carbonate 800 milliGRAM(s) Oral three times a day with meals  simvastatin 40 milliGRAM(s) Oral at bedtime  sodium chloride 0.9% lock flush 10 milliLiter(s) IV Push every 1 hour PRN      LABS                        8.3    8.46  )-----------( 122      ( 21 Nov 2022 04:53 )             25.8     11-22    128<L>  |  87<L>  |  76<H>  ----------------------------<  151<H>  5.3   |  19<L>  |  10.50<H>    Ca    8.7      22 Nov 2022 09:22  Phos  5.5     11-22  Mg     2.5     11-22    TPro  8.0  /  Alb  3.2<L>  /  TBili  0.6  /  DBili  x   /  AST  25  /  ALT  17  /  AlkPhos  179<H>  11-22    PT/INR - ( 21 Nov 2022 04:53 )   PT: 13.2 sec;   INR: 1.15 ratio         PTT - ( 21 Nov 2022 04:53 )  PTT:35.4 sec      RADIOLOGY & ADDITIONAL STUDIES

## 2022-11-22 NOTE — PROGRESS NOTE ADULT - SUBJECTIVE AND OBJECTIVE BOX
Internal Medicine   Romulo Church| PGY-1    OVERNIGHT EVENTS:      SUBJECTIVE:       MEDICATIONS  (STANDING):  chlorhexidine 4% Liquid 1 Application(s) Topical <User Schedule>  coronavirus bivalent (EUA) Booster Vaccine (PFIZER) 0.3 milliLiter(s) IntraMuscular once  epoetin cyndie-epbx (RETACRIT) Injectable 00924 Unit(s) IV Push <User Schedule>  mirtazapine 15 milliGRAM(s) Oral at bedtime  PARoxetine 20 milliGRAM(s) Oral daily  risperiDONE   Tablet 2 milliGRAM(s) Oral daily  sevelamer carbonate 800 milliGRAM(s) Oral three times a day with meals  simvastatin 40 milliGRAM(s) Oral at bedtime  vancomycin  IVPB 2000 milliGRAM(s) IV Intermittent every 48 hours    MEDICATIONS  (PRN):  acetaminophen     Tablet .. 650 milliGRAM(s) Oral every 6 hours PRN Temp greater or equal to 38C (100.4F), Mild Pain (1 - 3)  albuterol/ipratropium for Nebulization 3 milliLiter(s) Nebulizer every 6 hours PRN Shortness of Breath and/or Wheezing  HYDROmorphone   Tablet 2 milliGRAM(s) Oral every 4 hours PRN Severe Pain (7 - 10)  sodium chloride 0.9% lock flush 10 milliLiter(s) IV Push every 1 hour PRN Pre/post blood products, medications, blood draw, and to maintain line patency        T(F): 98.5 (11-22-22 @ 09:16), Max: 101.7 (11-22-22 @ 01:42)  HR: 90 (11-22-22 @ 09:16) (82 - 103)  BP: 166/92 (11-22-22 @ 09:16) (155/86 - 195/100)  BP(mean): --  RR: 18 (11-22-22 @ 09:16) (16 - 18)  SpO2: 99% (11-22-22 @ 09:16) (96% - 99%)    PHYSICAL EXAM:     GENERAL: NAD, lying in bed comfortably.  HEAD:  Atraumatic, normocephalic.  EYES: EOMI, PERRLA, conjunctiva and sclera clear, no nystagmus noted.  ENT: Moist mucous membranes,   NECK: Supple. No JVD. Trachea midline.  CHEST/LUNG: CTAB. No rales, rhonchi, wheezing, or rubs. Unlabored respirations.  HEART: RRR, no M/R/G, normal S1/S2.  ABDOMEN: Soft, nontender, nondistended, no organomegaly. Normoactive bowel sounds.  EXTREMITIES:  2+ peripheral pulses b/l, brisk capillary refill. No clubbing, cyanosis, or edema.  MSK: No gross deformities noted.   NEURO:  AAOx3, no focal deficits.   SKIN: No rashes or lesions.  PSYCH: Normal mood, affect.    TELEMETRY:    LABS:                        8.3    8.46  )-----------( 122      ( 21 Nov 2022 04:53 )             25.8     11-22    128<L>  |  87<L>  |  76<H>  ----------------------------<  151<H>  5.3   |  19<L>  |  10.50<H>    Ca    8.7      22 Nov 2022 09:22  Phos  5.5     11-22  Mg     2.5     11-22    TPro  8.0  /  Alb  3.2<L>  /  TBili  0.6  /  DBili  x   /  AST  25  /  ALT  17  /  AlkPhos  179<H>  11-22        PT/INR - ( 21 Nov 2022 04:53 )   PT: 13.2 sec;   INR: 1.15 ratio         PTT - ( 21 Nov 2022 04:53 )  PTT:35.4 sec    Creatinine Trend: 10.50<--, 13.97<--, 13.50<--, 12.50<--  I&O's Summary    21 Nov 2022 07:01  -  22 Nov 2022 07:00  --------------------------------------------------------  IN: 1460 mL / OUT: 4000 mL / NET: -2540 mL      BNP    RADIOLOGY & ADDITIONAL STUDIES:             Internal Medicine   Romulo Floressarah| PGY-1    OVERNIGHT EVENTS:  No acute events overnight    SUBJECTIVE:   Feels okay  Has arm and leg pain  Does not fit in his bed, wants a longer one  Denies headache, dizziness, weakness, SOB    MEDICATIONS  (STANDING):  chlorhexidine 4% Liquid 1 Application(s) Topical <User Schedule>  coronavirus bivalent (EUA) Booster Vaccine (PFIZER) 0.3 milliLiter(s) IntraMuscular once  epoetin cyndie-epbx (RETACRIT) Injectable 05833 Unit(s) IV Push <User Schedule>  mirtazapine 15 milliGRAM(s) Oral at bedtime  PARoxetine 20 milliGRAM(s) Oral daily  risperiDONE   Tablet 2 milliGRAM(s) Oral daily  sevelamer carbonate 800 milliGRAM(s) Oral three times a day with meals  simvastatin 40 milliGRAM(s) Oral at bedtime  vancomycin  IVPB 2000 milliGRAM(s) IV Intermittent every 48 hours    MEDICATIONS  (PRN):  acetaminophen     Tablet .. 650 milliGRAM(s) Oral every 6 hours PRN Temp greater or equal to 38C (100.4F), Mild Pain (1 - 3)  albuterol/ipratropium for Nebulization 3 milliLiter(s) Nebulizer every 6 hours PRN Shortness of Breath and/or Wheezing  HYDROmorphone   Tablet 2 milliGRAM(s) Oral every 4 hours PRN Severe Pain (7 - 10)  sodium chloride 0.9% lock flush 10 milliLiter(s) IV Push every 1 hour PRN Pre/post blood products, medications, blood draw, and to maintain line patency        T(F): 98.5 (11-22-22 @ 09:16), Max: 101.7 (11-22-22 @ 01:42)  HR: 90 (11-22-22 @ 09:16) (82 - 103)  BP: 166/92 (11-22-22 @ 09:16) (155/86 - 195/100)  BP(mean): --  RR: 18 (11-22-22 @ 09:16) (16 - 18)  SpO2: 99% (11-22-22 @ 09:16) (96% - 99%)    PHYSICAL EXAM:     GENERAL: NAD, lying in bed comfortably.  HEAD:  Atraumatic, normocephalic.  EYES: EOMI, PERRLA, conjunctiva and sclera clear, no nystagmus noted.  ENT: Moist mucous membranes,   NECK: Supple. No JVD. Trachea midline.  CHEST/LUNG: CTAB. No rales, rhonchi, wheezing, or rubs. Unlabored respirations.  HEART: RRR, no M/R/G, normal S1/S2.  ABDOMEN: Soft, nontender, nondistended, no organomegaly. Normoactive bowel sounds.  EXTREMITIES:  2+ peripheral pulses b/l, brisk capillary refill. No clubbing, cyanosis, or edema.  MSK: No gross deformities noted.   NEURO:  AAOx3, no focal deficits.   SKIN: No rashes or lesions.  PSYCH: Normal mood, affect.    TELEMETRY:    LABS:                        8.3    8.46  )-----------( 122      ( 21 Nov 2022 04:53 )             25.8     11-22    128<L>  |  87<L>  |  76<H>  ----------------------------<  151<H>  5.3   |  19<L>  |  10.50<H>    Ca    8.7      22 Nov 2022 09:22  Phos  5.5     11-22  Mg     2.5     11-22    TPro  8.0  /  Alb  3.2<L>  /  TBili  0.6  /  DBili  x   /  AST  25  /  ALT  17  /  AlkPhos  179<H>  11-22        PT/INR - ( 21 Nov 2022 04:53 )   PT: 13.2 sec;   INR: 1.15 ratio         PTT - ( 21 Nov 2022 04:53 )  PTT:35.4 sec    Creatinine Trend: 10.50<--, 13.97<--, 13.50<--, 12.50<--  I&O's Summary    21 Nov 2022 07:01  -  22 Nov 2022 07:00  --------------------------------------------------------  IN: 1460 mL / OUT: 4000 mL / NET: -2540 mL      BNP    RADIOLOGY & ADDITIONAL STUDIES:

## 2022-11-22 NOTE — PROGRESS NOTE ADULT - PROBLEM SELECTOR PLAN 5
Hx of schizophrenia. Home medication risperidone 2mg daily, seroquel 300qhs    - c/w home meds, QTc okay  - c/w Seroquel 300qhs
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
## 2022-11-22 NOTE — PROGRESS NOTE ADULT - ASSESSMENT
42-year-old male with past medical history ESRD on HD (at home, M to F via AV graft), COPD on home oxygen 4L, bipolar disorder, schizophrenia presents with fever and chills x1 day. Patient states he had a fever of up to 104 at home and in [place] ED was Tmax 100.6    ESRD on HD  Infected access,  cannulation attempted at [place] and they teresa pus  Last HD was 11/17/22  S/P Kaitlyn and HD on 11/21/22  plan for HD today and tomorrow (has home HD 5 times a week)  On Cefazolin per Infectious disease specialist  Appreciate Vascular surgery recs, patient with fluid collection next to AVF on CT and evidence of multiple pulmonary septic emboli  Renal diet  Daily BMP    HTN  UF on HD  resume home BP med regimen    Anemia of CKD  Epo during HD    BMD of CKD  Phos level QD  resume home binder regimen    Hyponatremia  likely volume related  UF on HD      For any question, call:  Cell # [phone]  Pager # [phone]  Callback # [phone]

## 2022-11-22 NOTE — PROGRESS NOTE ADULT - PROBLEM SELECTOR PLAN 3
Patient complete dialysis at home M/Tue/off Wed/Thur/ off Fri/ Sat/ Sun. AVF in place (prior hx of aneurysm per [facility] records); Last dialysis thurdsay 11/ 17. Unable to complete HD yesterday at [place] d/t increased bleeding and noted purulent fluid  O/p [facility] nephrology Dr. La (private)    - dialysis 11/21 & 11/22 s/p Shiley placement
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
## 2022-11-22 NOTE — CONSULT NOTE ADULT - ASSESSMENT
42M BMI 48, COPD on home oxygen, hypertensive nephropathy on dialysis, bipolar disorder, schizophrenia.   Here 11/19 with sepsis, high grade MSSA bacteremia.   Suspect from left arm AV fistula which has a PTFE graft after aneurysm revision 2/2021.   CT confirms this and also suggests pulmonary septic emboli.   Feels better but guarded long term prognosis.     Suggest  -agree with Ancef 1GM IV q24h post dialysis   -graft excision for source control   -TTE     Discussed with medicine     Keven Eduardo MD   Infectious Disease   Available on TEAMS. After 5PM and on weekends please page fellow on call or call [phone]

## 2022-11-22 NOTE — PROGRESS NOTE ADULT - PROBLEM SELECTOR PLAN 1
Found to have MSSA bacteremia 2/2 possible AVF infection d/t noted purulence. Transferred to [facility] for further imaging. S/p empiric treatment with vanc (11/19) and cefepime (11/19). Continued on ancef. Leukocytosis improved. LUE negative for findings of DVT.     -blood culture - aerobic gram positive in x4 bottles  - Tylenol 650 PRN  - c/w Ancef 11/20- (s/p vanc (11/19- 20), cefepime (11/19))  - pending CT C/A/P w/w/o IV contrast - r/o discitis, OM  - ID consulted  - s/p taiwo Found to have MSSA bacteremia 2/2 possible AVF infection d/t noted purulence. Transferred to [facility] for further imaging. S/p empiric treatment with vanc (11/19) and cefepime (11/19). Continued on ancef. Leukocytosis improved. LUE negative for findings of DVT.     -blood culture - aerobic gram positive in x4 bottles  - Tylenol 650 PRN  - c/w Ancef 11/20- (s/p vanc (11/19- 20), cefepime (11/19))  - pending CT C/A/P w/w/o IV contrast - r/o discitis, OM  - ID consulted; BCx tomorrow AM  - s/p taiwo Found to have MSSA bacteremia 2/2 possible AVF infection d/t noted purulence. Transferred to [facility] for further imaging. S/p empiric treatment with vanc (11/19) and cefepime (11/19). Continued on ancef. Leukocytosis improved. LUE negative for findings of DVT.     -blood culture - aerobic gram positive in x4 bottles  - Tylenol 650 PRN  - c/w Ancef 11/20- (s/p vanc (11/19- 20), cefepime (11/19))  - pending CT C/A/P w/w/o IV contrast - r/o discitis, OM  - ID consulted; BCx tomorrow AM  - s/p shiley  - CT Chest suggesting septic emboli to lungs   CT angio of LUE suggests graft infections- Vascular surgery recommends tagged WBC scan
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
## 2022-11-22 NOTE — PROGRESS NOTE ADULT - PROBLEM SELECTOR PLAN 4
Unclear medications- Metoprolol 50 BID and Procardia 30 (please verify with o/p pharmacy) - patient unsure    - verify with pharmacy Unclear medications- Metoprolol 50 BID and Procardia 30 (please verify with o/p pharmacy) - patient unsure    - med rec with pt's mom- does not use either medications

## 2022-11-22 NOTE — PROGRESS NOTE ADULT - SUBJECTIVE AND OBJECTIVE BOX
Interventional Radiology Follow-Up Note    Patient seen and examined @ bedside     This is a 42 year old Male s/p Non tunneled HD Catheter on 11/21/22 in Interventional Radiology    No complaint offered.    Medication:  ceFAZolin   IVPB: (11-20)  ceFAZolin   IVPB: (11-21)  clopidogrel Tablet: (11-20)  heparin   Injectable: (11-20)  labetalol Injectable: (11-21)  vancomycin  IVPB: (11-22)  vancomycin  IVPB: (11-20)    Vitals:  T(F): 98.4, Max: 101.7 (01:42)  HR: 96  BP: 159/82  RR: 18  SpO2: 96%    Physical Exam:  General: Nontoxic, in NAD.  Neck: right neck HD catheter site dressing c/d/i. No hematoma noted. No ttp      LABS:  Na: 129 (11-21 @ 04:53), 130 (11-20 @ 05:48), 130 (11-19 @ 17:24)  K: 5.6 (11-21 @ 04:53), 4.8 (11-20 @ 05:48), 4.7 (11-19 @ 17:24)  Cl: 84 (11-21 @ 04:53), 93 (11-20 @ 05:48), 91 (11-19 @ 17:24)  CO2: 20 (11-21 @ 04:53), 24 (11-20 @ 05:48), 25 (11-19 @ 17:24)  BUN: 100 (11-21 @ 04:53), 90 (11-20 @ 05:48), 83 (11-19 @ 17:24)  Cr: 13.97 (11-21 @ 04:53), 13.50 (11-20 @ 05:48), 12.50 (11-19 @ 17:24)  Glu: 97(11-21 @ 04:53), 182(11-20 @ 05:48), 159(11-19 @ 17:24)  Hgb: 8.3 (11-21 @ 04:53), 8.5 (11-20 @ 05:48), 9.3 (11-19 @ 17:24)  Hct: 25.8 (11-21 @ 04:53), 25.7 (11-20 @ 05:48), 28.6 (11-19 @ 17:24)  WBC: 8.46 (11-21 @ 04:53), 8.75 (11-20 @ 05:48), 11.61 (11-19 @ 17:24)  Plt: 122 (11-21 @ 04:53), 112 (11-20 @ 05:48), 128 (11-19 @ 17:24)  INR: 1.15 11-21-22 @ 04:53, 1.22 11-19-22 @ 18:07  PTT: 35.4 11-21-22 @ 04:53, 32.2 11-19-22 @ 18:07    LIVER FUNCTIONS - ( 21 Nov 2022 04:53 )  Alb: 3.4 g/dL / Pro: 7.5 g/dL / ALK PHOS: 146 U/L / ALT: 28 U/L / AST: 36 U/L / GGT: x             Assessment/Plan: This is a 42 year old Male s/p Non tunneled HD Catheter on 11/21/22 in Interventional Radiology     - Okay to use catheter.  - IR will sign off.     Please call IR at  [phone] with any questions, concerns, or issues regarding above.    Also available on Teams.

## 2022-11-22 NOTE — PROGRESS NOTE ADULT - ATTENDING COMMENTS
42-year-old male with past medical history ESRD on HD (at home, M to F via AV fistula), COPD on home oxygen 4L, bipolar disorder, schizophrenia presented to [place] fever and chills x1 day. Transferred to [facility] for continued MSSA bacteremia management and further imaging.    #MSSA bacteremia  #ESRD w/ infected AVF  #Schizophrenia  #R/O Discitis/OM  #Cirrhosis    -c/w ancef, f/u repeat cultures, echo  -CT of LUE to eval for infection of fistula, vascular following. Will need source control. Vascular surg recommending tagged WBC scan   -s/p Shiley placement on 11/21, HD after, second session planned for today.   -CT C/T/L to look for OM, inflammatory markers markedly elevated raising suspicion for OM/discitis, however noted septic emboli to lungs. ID consulted  -Cirrhosis w/ splenomegaly noted on imaging, no hx of liver disease. Likely i/s/o MAYER however will send off cirrhosis workup labs. Hepatology c/s tomorrow. RUQ U/S    d/w HS 1 42-year-old male with past medical history ESRD on HD (at home, M to F via AV graft), COPD on home oxygen 4L, bipolar disorder, schizophrenia presented to [place] fever and chills x1 day. Transferred to [facility] for continued MSSA bacteremia management and further imaging.    #MSSA bacteremia  #ESRD w/ infected AV graft  #Schizophrenia  #R/O Discitis/OM  #Cirrhosis    -c/w ancef, f/u repeat cultures, echo  -CT of LUE to eval for infection of fistula, vascular following. Will need source control. Vascular surg recommending tagged WBC scan   -s/p Shiley placement on 11/21, HD after, second session planned for today.   -CT C/T/L to look for OM, inflammatory markers markedly elevated raising suspicion for OM/discitis but not seen on CT, however noted septic emboli to lungs. ID consulted  -Cirrhosis w/ splenomegaly noted on imaging, no hx of liver disease. Likely i/s/o MAYER however will send off cirrhosis workup labs. Hepatology c/s tomorrow. RUQ U/S    d/w HS 1

## 2022-11-22 NOTE — PROGRESS NOTE ADULT - PROBLEM SELECTOR PLAN 6
Hx of bipolar disorder. Home medication: Risperidone 2mg daily, seroquel 300qhs, remeron 15 qhs    - c/w risperidone, seroquel, remeron

## 2022-11-23 ENCOUNTER — RESULT REVIEW (OUTPATIENT)
Age: 42
End: 2022-11-23

## 2022-11-23 LAB
-  AMPICILLIN/SULBACTAM: SIGNIFICANT CHANGE UP
-  AMPICILLIN/SULBACTAM: SIGNIFICANT CHANGE UP
-  CEFAZOLIN: SIGNIFICANT CHANGE UP
-  CEFAZOLIN: SIGNIFICANT CHANGE UP
-  CLINDAMYCIN: SIGNIFICANT CHANGE UP
-  CLINDAMYCIN: SIGNIFICANT CHANGE UP
-  ERYTHROMYCIN: SIGNIFICANT CHANGE UP
-  ERYTHROMYCIN: SIGNIFICANT CHANGE UP
-  GENTAMICIN: SIGNIFICANT CHANGE UP
-  GENTAMICIN: SIGNIFICANT CHANGE UP
-  OXACILLIN: SIGNIFICANT CHANGE UP
-  OXACILLIN: SIGNIFICANT CHANGE UP
-  PENICILLIN: SIGNIFICANT CHANGE UP
-  PENICILLIN: SIGNIFICANT CHANGE UP
-  RIFAMPIN: SIGNIFICANT CHANGE UP
-  RIFAMPIN: SIGNIFICANT CHANGE UP
-  TETRACYCLINE: SIGNIFICANT CHANGE UP
-  TETRACYCLINE: SIGNIFICANT CHANGE UP
-  TRIMETHOPRIM/SULFAMETHOXAZOLE: SIGNIFICANT CHANGE UP
-  TRIMETHOPRIM/SULFAMETHOXAZOLE: SIGNIFICANT CHANGE UP
-  VANCOMYCIN: SIGNIFICANT CHANGE UP
-  VANCOMYCIN: SIGNIFICANT CHANGE UP
A1C WITH ESTIMATED AVERAGE GLUCOSE RESULT: 5.9 % — HIGH (ref 4–5.6)
ALBUMIN SERPL ELPH-MCNC: 3.6 G/DL — SIGNIFICANT CHANGE UP (ref 3.3–5)
ALP SERPL-CCNC: 190 U/L — HIGH (ref 40–120)
ALT FLD-CCNC: 11 U/L — SIGNIFICANT CHANGE UP (ref 10–45)
ANION GAP SERPL CALC-SCNC: 18 MMOL/L — HIGH (ref 5–17)
APTT BLD: 30 SEC — SIGNIFICANT CHANGE UP (ref 27.5–35.5)
AST SERPL-CCNC: 18 U/L — SIGNIFICANT CHANGE UP (ref 10–40)
BASOPHILS # BLD AUTO: 0.01 K/UL — SIGNIFICANT CHANGE UP (ref 0–0.2)
BASOPHILS NFR BLD AUTO: 0.1 % — SIGNIFICANT CHANGE UP (ref 0–2)
BILIRUB SERPL-MCNC: 0.6 MG/DL — SIGNIFICANT CHANGE UP (ref 0.2–1.2)
BUN SERPL-MCNC: 62 MG/DL — HIGH (ref 7–23)
CALCIUM SERPL-MCNC: 9.2 MG/DL — SIGNIFICANT CHANGE UP (ref 8.4–10.5)
CHLORIDE SERPL-SCNC: 89 MMOL/L — LOW (ref 96–108)
CHOLEST SERPL-MCNC: 111 MG/DL — SIGNIFICANT CHANGE UP
CO2 SERPL-SCNC: 23 MMOL/L — SIGNIFICANT CHANGE UP (ref 22–31)
CREAT SERPL-MCNC: 8.7 MG/DL — HIGH (ref 0.5–1.3)
CULTURE RESULTS: SIGNIFICANT CHANGE UP
EGFR: 7 ML/MIN/1.73M2 — LOW
EOSINOPHIL # BLD AUTO: 0 K/UL — SIGNIFICANT CHANGE UP (ref 0–0.5)
EOSINOPHIL NFR BLD AUTO: 0 % — SIGNIFICANT CHANGE UP (ref 0–6)
ESTIMATED AVERAGE GLUCOSE: 123 MG/DL — HIGH (ref 68–114)
GGT SERPL-CCNC: 211 U/L — HIGH (ref 9–50)
GLUCOSE BLDC GLUCOMTR-MCNC: 126 MG/DL — HIGH (ref 70–99)
GLUCOSE BLDC GLUCOMTR-MCNC: 143 MG/DL — HIGH (ref 70–99)
GLUCOSE SERPL-MCNC: 167 MG/DL — HIGH (ref 70–99)
GRAM STN FLD: SIGNIFICANT CHANGE UP
HAV IGM SER-ACNC: SIGNIFICANT CHANGE UP
HBV CORE IGM SER-ACNC: SIGNIFICANT CHANGE UP
HBV SURFACE AG SER-ACNC: SIGNIFICANT CHANGE UP
HCT VFR BLD CALC: 27.1 % — LOW (ref 39–50)
HCV AB S/CO SERPL IA: 0.12 S/CO — SIGNIFICANT CHANGE UP (ref 0–0.99)
HCV AB SERPL-IMP: SIGNIFICANT CHANGE UP
HDLC SERPL-MCNC: 24 MG/DL — LOW
HGB BLD-MCNC: 8.5 G/DL — LOW (ref 13–17)
IMM GRANULOCYTES NFR BLD AUTO: 1.5 % — HIGH (ref 0–0.9)
INR BLD: 1.15 RATIO — SIGNIFICANT CHANGE UP (ref 0.88–1.16)
LIPID PNL WITH DIRECT LDL SERPL: 56 MG/DL — SIGNIFICANT CHANGE UP
LYMPHOCYTES # BLD AUTO: 0.69 K/UL — LOW (ref 1–3.3)
LYMPHOCYTES # BLD AUTO: 7.3 % — LOW (ref 13–44)
MAGNESIUM SERPL-MCNC: 2.4 MG/DL — SIGNIFICANT CHANGE UP (ref 1.6–2.6)
MCHC RBC-ENTMCNC: 31.4 GM/DL — LOW (ref 32–36)
MCHC RBC-ENTMCNC: 32 PG — SIGNIFICANT CHANGE UP (ref 27–34)
MCV RBC AUTO: 101.9 FL — HIGH (ref 80–100)
METHOD TYPE: SIGNIFICANT CHANGE UP
METHOD TYPE: SIGNIFICANT CHANGE UP
MONOCYTES # BLD AUTO: 1.51 K/UL — HIGH (ref 0–0.9)
MONOCYTES NFR BLD AUTO: 16 % — HIGH (ref 2–14)
MRSA PCR RESULT.: SIGNIFICANT CHANGE UP
NEUTROPHILS # BLD AUTO: 7.07 K/UL — SIGNIFICANT CHANGE UP (ref 1.8–7.4)
NEUTROPHILS NFR BLD AUTO: 75.1 % — SIGNIFICANT CHANGE UP (ref 43–77)
NON HDL CHOLESTEROL: 87 MG/DL — SIGNIFICANT CHANGE UP
NRBC # BLD: 0 /100 WBCS — SIGNIFICANT CHANGE UP (ref 0–0)
ORGANISM # SPEC MICROSCOPIC CNT: SIGNIFICANT CHANGE UP
PHOSPHATE SERPL-MCNC: 4 MG/DL — SIGNIFICANT CHANGE UP (ref 2.5–4.5)
PLATELET # BLD AUTO: 159 K/UL — SIGNIFICANT CHANGE UP (ref 150–400)
POTASSIUM SERPL-MCNC: 4.4 MMOL/L — SIGNIFICANT CHANGE UP (ref 3.5–5.3)
POTASSIUM SERPL-SCNC: 4.4 MMOL/L — SIGNIFICANT CHANGE UP (ref 3.5–5.3)
PROT SERPL-MCNC: 8.1 G/DL — SIGNIFICANT CHANGE UP (ref 6–8.3)
PROTHROM AB SERPL-ACNC: 13.4 SEC — SIGNIFICANT CHANGE UP (ref 10.5–13.4)
RBC # BLD: 2.66 M/UL — LOW (ref 4.2–5.8)
RBC # FLD: 14.6 % — HIGH (ref 10.3–14.5)
S AUREUS DNA NOSE QL NAA+PROBE: DETECTED
SODIUM SERPL-SCNC: 130 MMOL/L — LOW (ref 135–145)
SPECIMEN SOURCE: SIGNIFICANT CHANGE UP
TRIGL SERPL-MCNC: 155 MG/DL — HIGH
WBC # BLD: 9.42 K/UL — SIGNIFICANT CHANGE UP (ref 3.8–10.5)
WBC # FLD AUTO: 9.42 K/UL — SIGNIFICANT CHANGE UP (ref 3.8–10.5)

## 2022-11-23 PROCEDURE — 93306 TTE W/DOPPLER COMPLETE: CPT | Mod: 26

## 2022-11-23 PROCEDURE — 99233 SBSQ HOSP IP/OBS HIGH 50: CPT

## 2022-11-23 PROCEDURE — 88305 TISSUE EXAM BY PATHOLOGIST: CPT | Mod: 26

## 2022-11-23 PROCEDURE — 99233 SBSQ HOSP IP/OBS HIGH 50: CPT | Mod: GC

## 2022-11-23 DEVICE — CLIP APPLIER COVIDIEN SURGICLIP 13" LARGE: Type: IMPLANTABLE DEVICE | Site: LEFT | Status: FUNCTIONAL

## 2022-11-23 DEVICE — CLIP APPLIER COVIDIEN SURGICLIP II 9.75" MEDIUM: Type: IMPLANTABLE DEVICE | Site: LEFT | Status: FUNCTIONAL

## 2022-11-23 DEVICE — SURGIFOAM PAD 8CM X 12.5CM X 10MM (100): Type: IMPLANTABLE DEVICE | Site: LEFT | Status: FUNCTIONAL

## 2022-11-23 RX ORDER — SEVELAMER CARBONATE 2400 MG/1
800 POWDER, FOR SUSPENSION ORAL
Refills: 0 | Status: DISCONTINUED | OUTPATIENT
Start: 2022-11-23 | End: 2022-11-30

## 2022-11-23 RX ORDER — SODIUM CHLORIDE 9 MG/ML
10 INJECTION INTRAMUSCULAR; INTRAVENOUS; SUBCUTANEOUS
Refills: 0 | Status: DISCONTINUED | OUTPATIENT
Start: 2022-11-23 | End: 2022-11-30

## 2022-11-23 RX ORDER — RISPERIDONE 4 MG/1
2 TABLET ORAL DAILY
Refills: 0 | Status: DISCONTINUED | OUTPATIENT
Start: 2022-11-23 | End: 2022-11-30

## 2022-11-23 RX ORDER — ACETAMINOPHEN 500 MG
650 TABLET ORAL EVERY 6 HOURS
Refills: 0 | Status: DISCONTINUED | OUTPATIENT
Start: 2022-11-23 | End: 2022-11-24

## 2022-11-23 RX ORDER — ONDANSETRON 8 MG/1
4 TABLET, FILM COATED ORAL EVERY 6 HOURS
Refills: 0 | Status: DISCONTINUED | OUTPATIENT
Start: 2022-11-23 | End: 2022-11-30

## 2022-11-23 RX ORDER — QUETIAPINE FUMARATE 200 MG/1
300 TABLET, FILM COATED ORAL AT BEDTIME
Refills: 0 | Status: DISCONTINUED | OUTPATIENT
Start: 2022-11-23 | End: 2022-11-30

## 2022-11-23 RX ORDER — HYDROMORPHONE HYDROCHLORIDE 2 MG/ML
0.5 INJECTION INTRAMUSCULAR; INTRAVENOUS; SUBCUTANEOUS
Refills: 0 | Status: DISCONTINUED | OUTPATIENT
Start: 2022-11-23 | End: 2022-11-23

## 2022-11-23 RX ORDER — HYDROMORPHONE HYDROCHLORIDE 2 MG/ML
30 INJECTION INTRAMUSCULAR; INTRAVENOUS; SUBCUTANEOUS
Refills: 0 | Status: DISCONTINUED | OUTPATIENT
Start: 2022-11-23 | End: 2022-11-25

## 2022-11-23 RX ORDER — FENTANYL CITRATE 50 UG/ML
25 INJECTION INTRAVENOUS
Refills: 0 | Status: DISCONTINUED | OUTPATIENT
Start: 2022-11-23 | End: 2022-11-23

## 2022-11-23 RX ORDER — ONDANSETRON 8 MG/1
4 TABLET, FILM COATED ORAL EVERY 6 HOURS
Refills: 0 | Status: DISCONTINUED | OUTPATIENT
Start: 2022-11-23 | End: 2022-11-23

## 2022-11-23 RX ORDER — ERYTHROPOIETIN 10000 [IU]/ML
20000 INJECTION, SOLUTION INTRAVENOUS; SUBCUTANEOUS
Refills: 0 | Status: DISCONTINUED | OUTPATIENT
Start: 2022-11-23 | End: 2022-11-30

## 2022-11-23 RX ORDER — HYDROMORPHONE HYDROCHLORIDE 2 MG/ML
1 INJECTION INTRAMUSCULAR; INTRAVENOUS; SUBCUTANEOUS EVERY 4 HOURS
Refills: 0 | Status: DISCONTINUED | OUTPATIENT
Start: 2022-11-23 | End: 2022-11-23

## 2022-11-23 RX ORDER — MIRTAZAPINE 45 MG/1
15 TABLET, ORALLY DISINTEGRATING ORAL AT BEDTIME
Refills: 0 | Status: DISCONTINUED | OUTPATIENT
Start: 2022-11-23 | End: 2022-11-30

## 2022-11-23 RX ORDER — CEFAZOLIN SODIUM 1 G
1000 VIAL (EA) INJECTION EVERY 24 HOURS
Refills: 0 | Status: DISCONTINUED | OUTPATIENT
Start: 2022-11-23 | End: 2022-11-30

## 2022-11-23 RX ORDER — HYDRALAZINE HCL 50 MG
10 TABLET ORAL ONCE
Refills: 0 | Status: COMPLETED | OUTPATIENT
Start: 2022-11-23 | End: 2022-11-23

## 2022-11-23 RX ORDER — SIMVASTATIN 20 MG/1
40 TABLET, FILM COATED ORAL AT BEDTIME
Refills: 0 | Status: DISCONTINUED | OUTPATIENT
Start: 2022-11-23 | End: 2022-11-30

## 2022-11-23 RX ORDER — ONDANSETRON 8 MG/1
4 TABLET, FILM COATED ORAL ONCE
Refills: 0 | Status: DISCONTINUED | OUTPATIENT
Start: 2022-11-23 | End: 2022-11-23

## 2022-11-23 RX ORDER — CHLORHEXIDINE GLUCONATE 213 G/1000ML
1 SOLUTION TOPICAL
Refills: 0 | Status: DISCONTINUED | OUTPATIENT
Start: 2022-11-23 | End: 2022-11-30

## 2022-11-23 RX ORDER — NALBUPHINE HYDROCHLORIDE 10 MG/ML
2.5 INJECTION, SOLUTION INTRAMUSCULAR; INTRAVENOUS; SUBCUTANEOUS EVERY 6 HOURS
Refills: 0 | Status: DISCONTINUED | OUTPATIENT
Start: 2022-11-23 | End: 2022-11-30

## 2022-11-23 RX ORDER — SODIUM CHLORIDE 9 MG/ML
1000 INJECTION, SOLUTION INTRAVENOUS
Refills: 0 | Status: DISCONTINUED | OUTPATIENT
Start: 2022-11-23 | End: 2022-11-23

## 2022-11-23 RX ORDER — NALOXONE HYDROCHLORIDE 4 MG/.1ML
0.1 SPRAY NASAL
Refills: 0 | Status: DISCONTINUED | OUTPATIENT
Start: 2022-11-23 | End: 2022-11-30

## 2022-11-23 RX ORDER — QUETIAPINE FUMARATE 200 MG/1
300 TABLET, FILM COATED ORAL AT BEDTIME
Refills: 0 | Status: DISCONTINUED | OUTPATIENT
Start: 2022-11-23 | End: 2022-11-23

## 2022-11-23 RX ADMIN — HYDROMORPHONE HYDROCHLORIDE 0.5 MILLIGRAM(S): 2 INJECTION INTRAMUSCULAR; INTRAVENOUS; SUBCUTANEOUS at 19:45

## 2022-11-23 RX ADMIN — MIRTAZAPINE 15 MILLIGRAM(S): 45 TABLET, ORALLY DISINTEGRATING ORAL at 22:55

## 2022-11-23 RX ADMIN — SIMVASTATIN 40 MILLIGRAM(S): 20 TABLET, FILM COATED ORAL at 22:55

## 2022-11-23 RX ADMIN — HYDROMORPHONE HYDROCHLORIDE 1 MILLIGRAM(S): 2 INJECTION INTRAMUSCULAR; INTRAVENOUS; SUBCUTANEOUS at 10:36

## 2022-11-23 RX ADMIN — Medication 20 MILLIGRAM(S): at 12:00

## 2022-11-23 RX ADMIN — Medication 10 MILLIGRAM(S): at 20:10

## 2022-11-23 RX ADMIN — HYDROMORPHONE HYDROCHLORIDE 2 MILLIGRAM(S): 2 INJECTION INTRAMUSCULAR; INTRAVENOUS; SUBCUTANEOUS at 08:39

## 2022-11-23 RX ADMIN — SEVELAMER CARBONATE 800 MILLIGRAM(S): 2400 POWDER, FOR SUSPENSION ORAL at 07:39

## 2022-11-23 RX ADMIN — HYDROMORPHONE HYDROCHLORIDE 30 MILLILITER(S): 2 INJECTION INTRAMUSCULAR; INTRAVENOUS; SUBCUTANEOUS at 22:39

## 2022-11-23 RX ADMIN — Medication 3 MILLILITER(S): at 08:45

## 2022-11-23 RX ADMIN — ONDANSETRON 4 MILLIGRAM(S): 8 TABLET, FILM COATED ORAL at 10:35

## 2022-11-23 RX ADMIN — HYDROMORPHONE HYDROCHLORIDE 1 MILLIGRAM(S): 2 INJECTION INTRAMUSCULAR; INTRAVENOUS; SUBCUTANEOUS at 10:06

## 2022-11-23 RX ADMIN — HYDROMORPHONE HYDROCHLORIDE 0.5 MILLIGRAM(S): 2 INJECTION INTRAMUSCULAR; INTRAVENOUS; SUBCUTANEOUS at 20:53

## 2022-11-23 RX ADMIN — HYDROMORPHONE HYDROCHLORIDE 2 MILLIGRAM(S): 2 INJECTION INTRAMUSCULAR; INTRAVENOUS; SUBCUTANEOUS at 00:53

## 2022-11-23 RX ADMIN — CHLORHEXIDINE GLUCONATE 1 APPLICATION(S): 213 SOLUTION TOPICAL at 06:02

## 2022-11-23 RX ADMIN — HYDROMORPHONE HYDROCHLORIDE 0.5 MILLIGRAM(S): 2 INJECTION INTRAMUSCULAR; INTRAVENOUS; SUBCUTANEOUS at 19:30

## 2022-11-23 RX ADMIN — HYDROMORPHONE HYDROCHLORIDE 0.5 MILLIGRAM(S): 2 INJECTION INTRAMUSCULAR; INTRAVENOUS; SUBCUTANEOUS at 21:08

## 2022-11-23 RX ADMIN — RISPERIDONE 2 MILLIGRAM(S): 4 TABLET ORAL at 12:00

## 2022-11-23 RX ADMIN — QUETIAPINE FUMARATE 300 MILLIGRAM(S): 200 TABLET, FILM COATED ORAL at 22:55

## 2022-11-23 RX ADMIN — HYDROMORPHONE HYDROCHLORIDE 2 MILLIGRAM(S): 2 INJECTION INTRAMUSCULAR; INTRAVENOUS; SUBCUTANEOUS at 07:39

## 2022-11-23 NOTE — PROGRESS NOTE ADULT - ASSESSMENT
42-year-old male with past medical history ESRD on HD (at home, M to F via AV graft), COPD on home oxygen 4L, bipolar disorder, schizophrenia presents with fever and chills x1 day. Patient states he had a fever of up to 104 at home and in [place] ED was Tmax 100.6    ESRD on HD  Infected access,  plan for washout today  plan for HD today and Friday (has home HD 5 times a week)  On Cefazolin per Infectious disease specialist  Renal diet  Daily BMP    HTN  UF on HD  resume home BP med regimen    Anemia of CKD  Epo during HD    BMD of CKD  Phos level QD  resume home binder regimen    Hyponatremia  likely volume related  UF on HD      For any question, call:  Cell # [phone]  Pager # [phone]  Callback # [phone]

## 2022-11-23 NOTE — PROGRESS NOTE ADULT - PROBLEM SELECTOR PLAN 1
Found to have MSSA bacteremia 2/2 possible AVF infection d/t noted purulence. Transferred to [facility] for further imaging. S/p empiric treatment with vanc (11/19) and cefepime (11/19). Continued on ancef. Leukocytosis improved. LUE negative for findings of DVT.     -blood culture - aerobic gram positive in x4 bottles  - Tylenol 650 PRN  - c/w Ancef 11/20- (s/p vanc (11/19- 20), cefepime (11/19))  - pending CT C/A/P w/w/o IV contrast - r/o discitis, OM  - ID consulted; BCx tomorrow AM  - s/p shiley  - CT Chest suggesting septic emboli to lungs   CT angio of LUE suggests graft infections- Vascular surgery recommends tagged WBC scan Found to have MSSA bacteremia 2/2 possible AVF infection d/t noted purulence. Transferred to [facility] for further imaging. S/p empiric treatment with vanc (11/19) and cefepime (11/19). Continued on ancef. Leukocytosis improved. LUE negative for findings of DVT.     -blood culture - aerobic gram positive in x4 bottles  - Tylenol 650 PRN  - c/w Ancef 11/20- (s/p vanc (11/19- 20), cefepime (11/19))  - pending CT C/A/P w/w/o IV contrast - r/o discitis, OM  - s/p shiley  - CT Chest suggesting septic emboli to lungs  - CT angio of LUE suggests graft infections- Vascular surgery recommends tagged WBC scan-> unable to complete tagged scan anytime soon; asking vascular to take patient to OR asap as signs of septic emboli spewing from site Found to have MSSA bacteremia 2/2 possible AVF infection d/t noted purulence. Transferred to [facility] for further imaging. S/p empiric treatment with vanc (11/19) and cefepime (11/19). Continued on ancef. Leukocytosis improved. LUE negative for findings of DVT.     -blood culture - aerobic gram positive in x4 bottles  - Tylenol 650 PRN  - c/w Ancef 11/20- (s/p vanc (11/19- 20), cefepime (11/19))  - pending CT C/A/P w/w/o IV contrast - r/o discitis, OM  - s/p shiley  - CT Chest suggesting septic emboli to lungs  - CT angio of LUE suggests graft infections- Vascular surgery recommends tagged WBC scan-> unable to complete tagged scan anytime soon; asking vascular to take patient to OR asap as signs of septic emboli spewing from site  - Vascular Surgery to take pt to OR today

## 2022-11-23 NOTE — PROGRESS NOTE ADULT - ATTENDING COMMENTS
42-year-old male with past medical history ESRD on HD (at home, M to F via AV graft), COPD on home oxygen 4L, bipolar disorder, schizophrenia presented to [place] fever and chills x1 day. Transferred to [facility] for continued MSSA bacteremia management and further imaging.    #MSSA bacteremia  #ESRD w/ infected AV graft  #Schizophrenia  #R/O Discitis/OM  #Cirrhosis    -c/w ancef, MSSA bacteremia, serial blood cx until cleared, TTE grossly normal, given persistent bacteremia will need ALBINO  -CT of LUE to eval for infection of fistula, vascular following. Will need source control given dramatic worsening in appearance, plan for OR today w/ Vascular   -s/p Shiley placement on 11/21, HD per nephro  -CT C/T/L showed concern for septic emboli to lungs. ID consulted  -Cirrhosis w/ splenomegaly noted on imaging, no hx of liver disease. Likely i/s/o MAYER however will send off cirrhosis workup labs. Hepatology c/s tomorrow. RUQ doppler    Spoke to Vascular Fellow after OR, plan to transfer patient to Vascular Surgery service     d/w HS 1

## 2022-11-23 NOTE — PROGRESS NOTE ADULT - PROBLEM SELECTOR PLAN 4
Unclear medications- Metoprolol 50 BID and Procardia 30 (please verify with o/p pharmacy) - patient unsure    - med rec with pt's mom- does not use either medications
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
## 2022-11-23 NOTE — OCCUPATIONAL THERAPY INITIAL EVALUATION ADULT - LIVES WITH, PROFILE
Pt states he lives alone in apartment, 1st floor setup, pt states he is I in ADLs and ambulation prior to admission

## 2022-11-23 NOTE — PROGRESS NOTE ADULT - SUBJECTIVE AND OBJECTIVE BOX
Follow Up: MSSA    Interval History/ROS: Tired, arm hurts. GOing to OR today. NOthing else hurts. No other skin issues.     Allergies  aspirin (Swelling)  fish (Unknown)  penicillins (Swelling)  shellfish (Rash)        ANTIMICROBIALS:  ceFAZolin   IVPB 1000 every 24 hours      OTHER MEDS:  acetaminophen     Tablet .. 650 milliGRAM(s) Oral every 6 hours PRN  albuterol/ipratropium for Nebulization 3 milliLiter(s) Nebulizer every 6 hours PRN  chlorhexidine 4% Liquid 1 Application(s) Topical <User Schedule>  coronavirus bivalent (EUA) Booster Vaccine (PFIZER) 0.3 milliLiter(s) IntraMuscular once  epoetin cyndie-epbx (RETACRIT) Injectable 58573 Unit(s) IV Push <User Schedule>  HYDROmorphone  Injectable 1 milliGRAM(s) IV Push every 4 hours PRN  mirtazapine 15 milliGRAM(s) Oral at bedtime  ondansetron Injectable 4 milliGRAM(s) IV Push every 6 hours PRN  PARoxetine 20 milliGRAM(s) Oral daily  QUEtiapine 300 milliGRAM(s) Oral at bedtime  risperiDONE   Tablet 2 milliGRAM(s) Oral daily  sevelamer carbonate 800 milliGRAM(s) Oral three times a day with meals  simvastatin 40 milliGRAM(s) Oral at bedtime  sodium chloride 0.9% lock flush 10 milliLiter(s) IV Push every 1 hour PRN      Vital Signs Last 24 Hrs  T(C): 36.6 (23 Nov 2022 14:38), Max: 37.6 (23 Nov 2022 06:40)  T(F): 97.8 (23 Nov 2022 14:38), Max: 99.6 (23 Nov 2022 06:40)  HR: 90 (23 Nov 2022 14:38) (90 - 102)  BP: 170/82 (23 Nov 2022 14:38) (162/87 - 178/95)  BP(mean): --  RR: 18 (23 Nov 2022 14:38) (17 - 18)  SpO2: 98% (23 Nov 2022 14:38) (93% - 100%)    Parameters below as of 23 Nov 2022 14:38  Patient On (Oxygen Delivery Method): nasal cannula  O2 Flow (L/min): 4      Physical Exam:  General: non toxic, obese  Cardio: regular rate   Respiratory: nonlabored on nasal cannula  abd: soft, nontender   Musculoskeletal: no focal joint swelling  vascular: no phlebitis   Skin: no rash                          8.5    9.42  )-----------( 159      ( 23 Nov 2022 09:35 )             27.1       11-23    130<L>  |  89<L>  |  62<H>  ----------------------------<  167<H>  4.4   |  23  |  8.70<H>    Ca    9.2      23 Nov 2022 09:35  Phos  4.0     11-23  Mg     2.4     11-23    TPro  8.1  /  Alb  3.6  /  TBili  0.6  /  DBili  x   /  AST  18  /  ALT  11  /  AlkPhos  190<H>  11-23          MICROBIOLOGY:  Culture - Blood (collected 11-21-22 @ 03:27)  Source: .Blood Blood-Peripheral  Gram Stain (11-22-22 @ 04:57):    Growth in aerobic bottle: Gram Positive Cocci in Clusters    Growth in anaerobic bottle: Gram Positive Cocci in Clusters  Final Report (11-23-22 @ 14:30):    Growth in aerobic and anaerobic bottles: Staphylococcus aureus    See previous culture [id number]    Culture - Blood (collected 11-21-22 @ 03:27)  Source: .Blood Blood-Peripheral  Gram Stain (11-23-22 @ 08:35):    Growth in anaerobic bottle: Gram Positive Cocci in Clusters    Growth in aerobic bottle: Gram Positive Cocci in Clusters  Preliminary Report (11-23-22 @ 14:29):    Growth in anaerobic bottle: Staphylococcus aureus    Growth in aerobic bottle: Gram Positive Cocci in Clusters    ***Blood Panel PCR results on this specimen are available    approximately 3 hours after the Gram stain result.***    Gram stain, PCR, and/or culture results may not always    correspond due to difference in methodologies.    ************************************************************    This PCR assay was performed by multiplex PCR. This    Assay tests for 66 bacterial and resistance gene targets.    Please refer to the [facility] test directory    at [email or web address] for details.  Organism: Blood Culture PCR  Staphylococcus aureus (11-23-22 @ 14:29)  Organism: Staphylococcus aureus (11-23-22 @ 14:29)      -  Ampicillin/Sulbactam: S <=8/4      -  Cefazolin: S <=4      -  Clindamycin: S <=0.25      -  Erythromycin: S <=0.25      -  Gentamicin: S <=1 Should not be used as monotherapy      -  Oxacillin: S <=0.25 Oxacillin predicts susceptibility for dicloxacillin, methicillin, and nafcillin      -  Penicillin: R 8      -  Rifampin: S <=1 Should not be used as monotherapy      -  Tetracycline: S <=1      -  Trimethoprim/Sulfamethoxazole: S <=0.5/9.5      -  Vancomycin: S 2      Method Type: LISA  Organism: Blood Culture PCR (11-22-22 @ 11:00)      -  Methicillin SENSITIVE Staphylococcus aureus (MSSA): Detec Any isolate of Staphylococcus aureus from a blood culture is NOT considered a contaminant.      Method Type: PCR    Culture - Blood (collected 11-19-22 @ 18:00)  Source: .Blood Blood-Peripheral  Gram Stain (11-20-22 @ 13:24):    Growth in aerobic bottle: Gram Positive Cocci in Clusters    Growth in anaerobic bottle: Gram Positive Cocci in Clusters  Final Report (11-23-22 @ 07:37):    Growth in aerobic and anaerobic bottles: Staphylococcus aureus    See previous culture [id number]    Culture - Blood (collected 11-19-22 @ 17:50)  Source: .Blood Blood-Peripheral  Gram Stain (11-20-22 @ 13:27):    Growth in aerobic bottle: Gram Positive Cocci in Clusters    Growth in anaerobic bottle: Gram Positive Cocci in Clusters  Final Report (11-23-22 @ 07:36):    Growth in aerobic and anaerobic bottles: Staphylococcus aureus    ***Blood Panel PCR results on this specimen are available    approximately 3 hours after the Gram stain result.***    Gram stain, PCR, and/or culture results may not always    correspond dueto difference in methodologies.    ************************************************************    This PCR assay was performed by multiplex PCR. This    Assay tests for 66 bacterial and resistance gene targets.    Please refer to the [facility] test directory    at [email or web address] for details.  Organism: Blood Culture PCR  Staphylococcus aureus (11-23-22 @ 07:36)  Organism: Staphylococcus aureus (11-23-22 @ 07:36)      -  Ampicillin/Sulbactam: S <=8/4      -  Cefazolin: S <=4      -  Clindamycin: S <=0.25      -  Erythromycin: S <=0.25      -  Gentamicin: S <=1 Should not be used as monotherapy      -  Oxacillin: S 0.5 Oxacillin predicts susceptibility for dicloxacillin, methicillin, and nafcillin      -  Penicillin: R >8      -  Rifampin: S <=1 Should not be used as monotherapy      -  Tetracycline: S <=1      -  Trimethoprim/Sulfamethoxazole: S <=0.5/9.5      -  Vancomycin: S 2      Method Type: LISA  Organism: Blood Culture PCR (11-23-22 @ 07:36)      -  Methicillin SENSITIVE Staphylococcus aureus (MSSA): Detec Any isolate of Staphylococcus aureus from a blood culture is NOT considered a contaminant.      Method Type: PCR    Rapid RVP Result: NotDetec (11-19 @ 17:44)      RADIOLOGY:  Images below reviewed personally  US Abdomen Upper Quadrant Right (11.22.22 @ 18:46)   Echogenic high density inthe portal confluence, likely nonocclusive   thrombus.  Heterogeneous hepatic echotexture.  Atrophic right kidney.    CT Lumbar Spine Reform w/ IV Cont (11.21.22 @ 17:05)   Cervical spine CT: No evidence for discitis/osteomyelitis. Nonenhancing   fluid collection within the prevertebral soft tissues can be further   evaluated with contrast enhanced cervical spine MRI if clinically   feasible.  Thoracic spine CT: No evidence for discitis/osteomyelitis. Multiple   patchy areas of increased density within the lungs likely representing   pneumonia. Please see chest CT of the same day for further information   regarding the chest.  Lumbar spine CT: No evidence for osteomyelitis/discitis.    CT Angio Upper Extremity w/ IV Cont, Left (11.21.22 @ 16:44)   IMPRESSION:  1.  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 Surrounding low-attenuation measuring up   to 4.5 cm in thickness is suspicious for a fluid collection, versus mural   thrombus of the aneurysm; consider targeted ultrasound to confirm this is   fluid. Extensive surrounding subcutaneous infiltration which extends   superiorly to the origin of the bypassgraft; the remainder of the graft   is normal.  2.  The nodular lung opacities are suspicious for septic emboli.

## 2022-11-23 NOTE — PROGRESS NOTE ADULT - SUBJECTIVE AND OBJECTIVE BOX
Internal Medicine   Romulo Floressarah| PGY-1    OVERNIGHT EVENTS:      SUBJECTIVE:       MEDICATIONS  (STANDING):  ceFAZolin   IVPB 1000 milliGRAM(s) IV Intermittent every 24 hours  chlorhexidine 4% Liquid 1 Application(s) Topical <User Schedule>  coronavirus bivalent (EUA) Booster Vaccine (PFIZER) 0.3 milliLiter(s) IntraMuscular once  epoetin cyndie-epbx (RETACRIT) Injectable 30513 Unit(s) IV Push <User Schedule>  mirtazapine 15 milliGRAM(s) Oral at bedtime  PARoxetine 20 milliGRAM(s) Oral daily  QUEtiapine 300 milliGRAM(s) Oral at bedtime  risperiDONE   Tablet 2 milliGRAM(s) Oral daily  sevelamer carbonate 800 milliGRAM(s) Oral three times a day with meals  simvastatin 40 milliGRAM(s) Oral at bedtime    MEDICATIONS  (PRN):  acetaminophen     Tablet .. 650 milliGRAM(s) Oral every 6 hours PRN Temp greater or equal to 38C (100.4F), Mild Pain (1 - 3)  albuterol/ipratropium for Nebulization 3 milliLiter(s) Nebulizer every 6 hours PRN Shortness of Breath and/or Wheezing  HYDROmorphone   Tablet 2 milliGRAM(s) Oral every 4 hours PRN Severe Pain (7 - 10)  sodium chloride 0.9% lock flush 10 milliLiter(s) IV Push every 1 hour PRN Pre/post blood products, medications, blood draw, and to maintain line patency        T(F): 99.6 (11-23-22 @ 06:40), Max: 99.6 (11-23-22 @ 06:40)  HR: 101 (11-23-22 @ 06:40) (90 - 101)  BP: 178/95 (11-23-22 @ 06:40) (162/87 - 178/95)  BP(mean): --  RR: 18 (11-23-22 @ 06:40) (17 - 18)  SpO2: 100% (11-23-22 @ 06:40) (98% - 100%)    PHYSICAL EXAM:     GENERAL: NAD, lying in bed comfortably.  HEAD:  Atraumatic, normocephalic.  EYES: EOMI, PERRLA, conjunctiva and sclera clear, no nystagmus noted.  ENT: Moist mucous membranes,   NECK: Supple. No JVD. Trachea midline.  CHEST/LUNG: CTAB. No rales, rhonchi, wheezing, or rubs. Unlabored respirations.  HEART: RRR, no M/R/G, normal S1/S2.  ABDOMEN: Soft, nontender, nondistended, no organomegaly. Normoactive bowel sounds.  EXTREMITIES:  2+ peripheral pulses b/l, brisk capillary refill. No clubbing, cyanosis, or edema.  MSK: No gross deformities noted.   NEURO:  AAOx3, no focal deficits.   SKIN: No rashes or lesions.  PSYCH: Normal mood, affect.    TELEMETRY:    LABS:                        8.1    9.61  )-----------( 138      ( 22 Nov 2022 14:38 )             24.7     11-22    128<L>  |  87<L>  |  76<H>  ----------------------------<  151<H>  5.3   |  19<L>  |  10.50<H>    Ca    8.7      22 Nov 2022 09:22  Phos  5.5     11-22  Mg     2.5     11-22    TPro  8.0  /  Alb  3.2<L>  /  TBili  0.6  /  DBili  x   /  AST  25  /  ALT  17  /  AlkPhos  179<H>  11-22            Creatinine Trend: 10.50<--, 13.97<--, 13.50<--, 12.50<--  I&O's Summary    22 Nov 2022 07:01  -  23 Nov 2022 07:00  --------------------------------------------------------  IN: 620 mL / OUT: 3500 mL / NET: -2880 mL      BNP    RADIOLOGY & ADDITIONAL STUDIES:             Internal Medicine   Romulo Church| PGY-1    OVERNIGHT EVENTS:  Patient's graft site had some bleeding overnight  RUQUS done- nonoccluding thrombus seen and fibrosis    SUBJECTIVE:   Patient endorsing marked pain at graft site  Also shoulder and leg pain  Denies headache, dizziness, weakness, SOB, abdominal pain      MEDICATIONS  (STANDING):  ceFAZolin   IVPB 1000 milliGRAM(s) IV Intermittent every 24 hours  chlorhexidine 4% Liquid 1 Application(s) Topical <User Schedule>  coronavirus bivalent (EUA) Booster Vaccine (PFIZER) 0.3 milliLiter(s) IntraMuscular once  epoetin cyndie-epbx (RETACRIT) Injectable 28268 Unit(s) IV Push <User Schedule>  mirtazapine 15 milliGRAM(s) Oral at bedtime  PARoxetine 20 milliGRAM(s) Oral daily  QUEtiapine 300 milliGRAM(s) Oral at bedtime  risperiDONE   Tablet 2 milliGRAM(s) Oral daily  sevelamer carbonate 800 milliGRAM(s) Oral three times a day with meals  simvastatin 40 milliGRAM(s) Oral at bedtime    MEDICATIONS  (PRN):  acetaminophen     Tablet .. 650 milliGRAM(s) Oral every 6 hours PRN Temp greater or equal to 38C (100.4F), Mild Pain (1 - 3)  albuterol/ipratropium for Nebulization 3 milliLiter(s) Nebulizer every 6 hours PRN Shortness of Breath and/or Wheezing  HYDROmorphone   Tablet 2 milliGRAM(s) Oral every 4 hours PRN Severe Pain (7 - 10)  sodium chloride 0.9% lock flush 10 milliLiter(s) IV Push every 1 hour PRN Pre/post blood products, medications, blood draw, and to maintain line patency        T(F): 99.6 (11-23-22 @ 06:40), Max: 99.6 (11-23-22 @ 06:40)  HR: 101 (11-23-22 @ 06:40) (90 - 101)  BP: 178/95 (11-23-22 @ 06:40) (162/87 - 178/95)  BP(mean): --  RR: 18 (11-23-22 @ 06:40) (17 - 18)  SpO2: 100% (11-23-22 @ 06:40) (98% - 100%)    PHYSICAL EXAM:     GENERAL: NAD, lying in bed comfortably.  HEAD:  Atraumatic, normocephalic.  EYES: EOMI, PERRLA, conjunctiva and sclera clear, no nystagmus noted.  ENT: Moist mucous membranes,   NECK: Supple. No JVD. Trachea midline.  CHEST/LUNG: CTAB. No rales, rhonchi, wheezing, or rubs. Unlabored respirations.  HEART: RRR, no M/R/G, normal S1/S2.  ABDOMEN: Soft, nontender, nondistended, no organomegaly. Normoactive bowel sounds.  EXTREMITIES:  2+ peripheral pulses b/l, brisk capillary refill. No clubbing, cyanosis, or edema. LUE site of graft erythematous, indurated and extremely tender. No weeping lesions or active bleeding spotted by me.  MSK: No gross deformities noted.   NEURO:  AAOx3, no focal deficits.   SKIN: No rashes or lesions.  PSYCH: Normal mood, affect.    TELEMETRY:    LABS:                        8.1    9.61  )-----------( 138      ( 22 Nov 2022 14:38 )             24.7     11-22    128<L>  |  87<L>  |  76<H>  ----------------------------<  151<H>  5.3   |  19<L>  |  10.50<H>    Ca    8.7      22 Nov 2022 09:22  Phos  5.5     11-22  Mg     2.5     11-22    TPro  8.0  /  Alb  3.2<L>  /  TBili  0.6  /  DBili  x   /  AST  25  /  ALT  17  /  AlkPhos  179<H>  11-22            Creatinine Trend: 10.50<--, 13.97<--, 13.50<--, 12.50<--  I&O's Summary    22 Nov 2022 07:01  -  23 Nov 2022 07:00  --------------------------------------------------------  IN: 620 mL / OUT: 3500 mL / NET: -2880 mL      BNP    RADIOLOGY & ADDITIONAL STUDIES:

## 2022-11-23 NOTE — CHART NOTE - NSCHARTNOTEFT_GEN_A_CORE
Post Operative Note  Patient: PRESTON JOHNSON 42y (1980) Male   MRN: [id number]  Location: [facility] PACU 14  Visit: 11-20-22 Inpatient  Date: 11-23-22 @ 23:09    Procedure: S/P ***    Subjective: Patient seen and examined post operatively. Pt sleeping but arousable during exam. Reports pain as controlled. Denies nausea, vomiting, fever, chills, chest pain, SOB, cough.      Objective:  Vitals: T(F): 98.1 (11-23-22 @ 22:30), Max: 99.6 (11-23-22 @ 06:40)  HR: 86 (11-23-22 @ 22:45)  BP: 153/68 (11-23-22 @ 22:30) (153/68 - 236/108)  RR: 15 (11-23-22 @ 22:45)  SpO2: 99% (11-23-22 @ 22:45)  Vent Settings:     In:   11-22-22 @ 07:01  -  11-23-22 @ 07:00  --------------------------------------------------------  IN: 740 mL    11-23-22 @ 07:01  -  11-23-22 @ 23:09  --------------------------------------------------------  IN: 0 mL      IV Fluids: lactated ringers. 1000 milliLiter(s) (75 mL/Hr) IV Continuous <Continuous>      Out:   11-22-22 @ 07:01  -  11-23-22 @ 07:00  --------------------------------------------------------  OUT: 3750 mL    11-23-22 @ 07:01  -  11-23-22 @ 23:09  --------------------------------------------------------  OUT: 0 mL      EBL:     Voided Urine:   11-22-22 @ 07:01  -  11-23-22 @ 07:00  --------------------------------------------------------  OUT: 3750 mL    11-23-22 @ 07:01  -  11-23-22 @ 23:09  --------------------------------------------------------  OUT: 0 mL      Physical Examination:  General: NAD, somewhat somnolent, resting comfortably in bed  HEENT: Normocephalic atraumatic  Respiratory: Nonlabored respirations, normal CW expansion.  Cardio: S1S2, regular rate and rhythm.  Abdomen: obese, nontender   Vascular: RUE radial and ulnar wounds palpable underneath ace. Hand warm, well-perfused w sensorimotor fx intact    Imaging:  No post-op imaging studies    Assessment:  42yMale patient s/p infected AVG excision    Plan:  - IV Abx:   - Pain control w PCA  - Plan for dialysis in AM  - Transfer to vascular service  - Wound packed w betadine-soaked kerlix and nuknit, ACE wrap  - Diet: renal diet      Date/Time: 11-23-22 @ 23:09
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
## 2022-11-23 NOTE — PROGRESS NOTE ADULT - ASSESSMENT
42-year-old male with past medical history ESRD on HD (at home, M to F via AV graft), COPD on home oxygen 4L, bipolar disorder, schizophrenia presented to [place] fever and chills x1 day. Transferred to [facility] for continued MSSA bacteremia management and further imaging.

## 2022-11-23 NOTE — PRE-OP CHECKLIST - SITE MARKED BY ANESTHESIOLOGIST
Drink plenty of fluids, ice areas for 15 minutes at a time.  Take tylenol/ibuprofen for pain/discomfort.  You will likely have increased pain/discomfort over next 3 days with slow improvement thereafter.  
n/a

## 2022-11-23 NOTE — PROGRESS NOTE ADULT - SUBJECTIVE AND OBJECTIVE BOX
[facility] - HUSSAIN Sanchez / HUSSAIN Villalobos / ERNESTO Mehta/ HUSSAIN Mauricio/ HUSSAIN Barber/ IVONNE Hui / JADEN La / BRANDON Alexander  ---------------------------------------------------------------------------------------------------------------  seen and examined today for ESRD  Interval : Abscess burst of LUE AVF overnight  VITALS:  T(F): 98.7 (11-23-22 @ 09:03), Max: 99.6 (11-23-22 @ 06:40)  HR: 102 (11-23-22 @ 09:03)  BP: 176/95 (11-23-22 @ 09:03)  RR: 18 (11-23-22 @ 09:03)  SpO2: 93% (11-23-22 @ 09:03)  Wt(kg): --    11-22 @ 07:01  -  11-23 @ 07:00  --------------------------------------------------------  IN: 740 mL / OUT: 3750 mL / NET: -3010 mL      Physical Exam :-  Constitutional: NAD  Neck: Supple.  Respiratory: Bilateral equal breath sounds,  Cardiovascular: S1, S2 normal,  Gastrointestinal: Bowel Sounds present, soft, non tender.  Extremities: Clean dressing over LUE  Neurological: Alert and Oriented x 3, no focal deficits  Psychiatric: Normal mood, normal affect  Data:-  Allergies :   aspirin (Swelling)  fish (Unknown)  penicillins (Swelling)  shellfish (Rash)    Hospital Medications:   MEDICATIONS  (STANDING):  ceFAZolin   IVPB 1000 milliGRAM(s) IV Intermittent every 24 hours  chlorhexidine 4% Liquid 1 Application(s) Topical <User Schedule>  coronavirus bivalent (EUA) Booster Vaccine (PFIZER) 0.3 milliLiter(s) IntraMuscular once  epoetin cyndie-epbx (RETACRIT) Injectable 40205 Unit(s) IV Push <User Schedule>  mirtazapine 15 milliGRAM(s) Oral at bedtime  PARoxetine 20 milliGRAM(s) Oral daily  QUEtiapine 300 milliGRAM(s) Oral at bedtime  risperiDONE   Tablet 2 milliGRAM(s) Oral daily  sevelamer carbonate 800 milliGRAM(s) Oral three times a day with meals  simvastatin 40 milliGRAM(s) Oral at bedtime    11-23    130<L>  |  89<L>  |  62<H>  ----------------------------<  167<H>  4.4   |  23  |  8.70<H>    Ca    9.2      23 Nov 2022 09:35  Phos  4.0     11-23  Mg     2.4     11-23    TPro  8.1  /  Alb  3.6  /  TBili  0.6  /  DBili      /  AST  18  /  ALT  11  /  AlkPhos  190<H>  11-23    Creatinine Trend: 8.70 <--, 10.50 <--, 13.97 <--, 13.50 <--, 12.50 <--                        8.5    9.42  )-----------( 159      ( 23 Nov 2022 09:35 )             27.1

## 2022-11-23 NOTE — PROGRESS NOTE ADULT - ASSESSMENT
42M BMI 48, COPD on home oxygen, hypertensive nephropathy on dialysis, bipolar disorder, schizophrenia.   Here 11/19 with sepsis, high grade MSSA bacteremia.   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   Sustained bacteremia not surprising.   Also suggests pulmonary septic emboli.   Guarded long term prognosis.     Suggest  -graft excision for source control - OR today   -Ancef 1GM IV q24h post dialysis   -TTE   -f/u repeat blood cultures     Discussed with medicine and vascular     Keven Eduardo MD   Infectious Disease   Available on TEAMS. After 5PM and on weekends please page fellow on call or call [phone]

## 2022-11-23 NOTE — PROGRESS NOTE ADULT - PROBLEM SELECTOR PLAN 3
Patient complete dialysis at home M/Tue/off Wed/Thur/ off Fri/ Sat/ Sun. AVF in place (prior hx of aneurysm per [facility] records); Last dialysis thurdsay 11/ 17. Unable to complete HD yesterday at [place] d/t increased bleeding and noted purulent fluid  O/p [facility] [place]. La (private)    - dialysis 11/21 & 11/22 s/p Shiley placement Patient complete dialysis at home M/Tue/off Wed/Thur/ off Fri/ Sat/ Sun. AVF in place (prior hx of aneurysm per [facility] records); Last dialysis thurdsay 11/ 17. Unable to complete HD yesterday at [place] d/t increased bleeding and noted purulent fluid  [place] [facility] [place] La (private)    - dialysis 11/21, 11/22 & 11/23 s/p Kaitlyn placement

## 2022-11-23 NOTE — OCCUPATIONAL THERAPY INITIAL EVALUATION ADULT - RANGE OF MOTION EXAMINATION, LOWER EXTREMITY
bilateral LE Active ROM was WFL  (within functional limits)/bilateral LE Active Assistive ROM was WFL  (within functional limits)

## 2022-11-24 LAB
ANION GAP SERPL CALC-SCNC: 21 MMOL/L — HIGH (ref 5–17)
BASOPHILS # BLD AUTO: 0.01 K/UL — SIGNIFICANT CHANGE UP (ref 0–0.2)
BASOPHILS NFR BLD AUTO: 0.1 % — SIGNIFICANT CHANGE UP (ref 0–2)
BUN SERPL-MCNC: 85 MG/DL — HIGH (ref 7–23)
CALCIUM SERPL-MCNC: 8.9 MG/DL — SIGNIFICANT CHANGE UP (ref 8.4–10.5)
CHLORIDE SERPL-SCNC: 87 MMOL/L — LOW (ref 96–108)
CO2 SERPL-SCNC: 22 MMOL/L — SIGNIFICANT CHANGE UP (ref 22–31)
CREAT SERPL-MCNC: 10.55 MG/DL — HIGH (ref 0.5–1.3)
CULTURE RESULTS: SIGNIFICANT CHANGE UP
EGFR: 6 ML/MIN/1.73M2 — LOW
EOSINOPHIL # BLD AUTO: 0 K/UL — SIGNIFICANT CHANGE UP (ref 0–0.5)
EOSINOPHIL NFR BLD AUTO: 0 % — SIGNIFICANT CHANGE UP (ref 0–6)
FERRITIN SERPL-MCNC: 3758 NG/ML — HIGH (ref 30–400)
FOLATE SERPL-MCNC: 5.8 NG/ML — SIGNIFICANT CHANGE UP
GLUCOSE SERPL-MCNC: 148 MG/DL — HIGH (ref 70–99)
HCT VFR BLD CALC: 25.6 % — LOW (ref 39–50)
HGB BLD-MCNC: 8.1 G/DL — LOW (ref 13–17)
IMM GRANULOCYTES NFR BLD AUTO: 2.1 % — HIGH (ref 0–0.9)
LYMPHOCYTES # BLD AUTO: 0.79 K/UL — LOW (ref 1–3.3)
LYMPHOCYTES # BLD AUTO: 9 % — LOW (ref 13–44)
MAGNESIUM SERPL-MCNC: 2.5 MG/DL — SIGNIFICANT CHANGE UP (ref 1.6–2.6)
MCHC RBC-ENTMCNC: 31.6 GM/DL — LOW (ref 32–36)
MCHC RBC-ENTMCNC: 32 PG — SIGNIFICANT CHANGE UP (ref 27–34)
MCV RBC AUTO: 101.2 FL — HIGH (ref 80–100)
MONOCYTES # BLD AUTO: 1.06 K/UL — HIGH (ref 0–0.9)
MONOCYTES NFR BLD AUTO: 12.1 % — SIGNIFICANT CHANGE UP (ref 2–14)
NEUTROPHILS # BLD AUTO: 6.69 K/UL — SIGNIFICANT CHANGE UP (ref 1.8–7.4)
NEUTROPHILS NFR BLD AUTO: 76.7 % — SIGNIFICANT CHANGE UP (ref 43–77)
NRBC # BLD: 0 /100 WBCS — SIGNIFICANT CHANGE UP (ref 0–0)
ORGANISM # SPEC MICROSCOPIC CNT: SIGNIFICANT CHANGE UP
PHOSPHATE SERPL-MCNC: 7 MG/DL — HIGH (ref 2.5–4.5)
PLATELET # BLD AUTO: 165 K/UL — SIGNIFICANT CHANGE UP (ref 150–400)
POTASSIUM SERPL-MCNC: 5.2 MMOL/L — SIGNIFICANT CHANGE UP (ref 3.5–5.3)
POTASSIUM SERPL-SCNC: 5.2 MMOL/L — SIGNIFICANT CHANGE UP (ref 3.5–5.3)
RBC # BLD: 2.53 M/UL — LOW (ref 4.2–5.8)
RBC # FLD: 16.3 % — HIGH (ref 10.3–14.5)
SODIUM SERPL-SCNC: 130 MMOL/L — LOW (ref 135–145)
SPECIMEN SOURCE: SIGNIFICANT CHANGE UP
TRANSFERRIN SERPL-MCNC: 144 MG/DL — LOW (ref 200–360)
VIT B12 SERPL-MCNC: 1460 PG/ML — HIGH (ref 232–1245)
WBC # BLD: 8.73 K/UL — SIGNIFICANT CHANGE UP (ref 3.8–10.5)
WBC # FLD AUTO: 8.73 K/UL — SIGNIFICANT CHANGE UP (ref 3.8–10.5)

## 2022-11-24 PROCEDURE — 99232 SBSQ HOSP IP/OBS MODERATE 35: CPT

## 2022-11-24 RX ORDER — ACETAMINOPHEN 500 MG
1000 TABLET ORAL EVERY 6 HOURS
Refills: 0 | Status: COMPLETED | OUTPATIENT
Start: 2022-11-24 | End: 2022-11-25

## 2022-11-24 RX ORDER — HEPARIN SODIUM 5000 [USP'U]/ML
5000 INJECTION INTRAVENOUS; SUBCUTANEOUS EVERY 8 HOURS
Refills: 0 | Status: DISCONTINUED | OUTPATIENT
Start: 2022-11-24 | End: 2022-11-30

## 2022-11-24 RX ORDER — SODIUM CHLORIDE 9 MG/ML
1000 INJECTION INTRAMUSCULAR; INTRAVENOUS; SUBCUTANEOUS
Refills: 0 | Status: DISCONTINUED | OUTPATIENT
Start: 2022-11-24 | End: 2022-11-28

## 2022-11-24 RX ADMIN — HEPARIN SODIUM 5000 UNIT(S): 5000 INJECTION INTRAVENOUS; SUBCUTANEOUS at 13:44

## 2022-11-24 RX ADMIN — Medication 100 MILLIGRAM(S): at 17:11

## 2022-11-24 RX ADMIN — HYDROMORPHONE HYDROCHLORIDE 30 MILLILITER(S): 2 INJECTION INTRAMUSCULAR; INTRAVENOUS; SUBCUTANEOUS at 07:22

## 2022-11-24 RX ADMIN — SIMVASTATIN 40 MILLIGRAM(S): 20 TABLET, FILM COATED ORAL at 21:08

## 2022-11-24 RX ADMIN — RISPERIDONE 2 MILLIGRAM(S): 4 TABLET ORAL at 12:03

## 2022-11-24 RX ADMIN — Medication 20 MILLIGRAM(S): at 12:02

## 2022-11-24 RX ADMIN — CHLORHEXIDINE GLUCONATE 1 APPLICATION(S): 213 SOLUTION TOPICAL at 05:19

## 2022-11-24 RX ADMIN — SEVELAMER CARBONATE 800 MILLIGRAM(S): 2400 POWDER, FOR SUSPENSION ORAL at 17:11

## 2022-11-24 RX ADMIN — QUETIAPINE FUMARATE 300 MILLIGRAM(S): 200 TABLET, FILM COATED ORAL at 21:08

## 2022-11-24 RX ADMIN — HYDROMORPHONE HYDROCHLORIDE 30 MILLILITER(S): 2 INJECTION INTRAMUSCULAR; INTRAVENOUS; SUBCUTANEOUS at 19:13

## 2022-11-24 RX ADMIN — MIRTAZAPINE 15 MILLIGRAM(S): 45 TABLET, ORALLY DISINTEGRATING ORAL at 21:08

## 2022-11-24 RX ADMIN — SODIUM CHLORIDE 20 MILLILITER(S): 9 INJECTION INTRAMUSCULAR; INTRAVENOUS; SUBCUTANEOUS at 04:40

## 2022-11-24 RX ADMIN — SEVELAMER CARBONATE 800 MILLIGRAM(S): 2400 POWDER, FOR SUSPENSION ORAL at 12:02

## 2022-11-24 RX ADMIN — SEVELAMER CARBONATE 800 MILLIGRAM(S): 2400 POWDER, FOR SUSPENSION ORAL at 09:35

## 2022-11-24 RX ADMIN — HYDROMORPHONE HYDROCHLORIDE 30 MILLILITER(S): 2 INJECTION INTRAMUSCULAR; INTRAVENOUS; SUBCUTANEOUS at 00:51

## 2022-11-24 RX ADMIN — HEPARIN SODIUM 5000 UNIT(S): 5000 INJECTION INTRAVENOUS; SUBCUTANEOUS at 21:08

## 2022-11-24 NOTE — PROGRESS NOTE ADULT - SUBJECTIVE AND OBJECTIVE BOX
[facility] [place] [facility] - HUSSAIN Sanchez / HUSSAIN Villalobos / ERNESTO Mehta/ HUSSAIN Mauricio/ HUSSAIN Barber/ IVONNE Hui / JADEN La / BRNADON Alexander  ---------------------------------------------------------------------------------------------------------------  seen and examined today for ESRD  Interval : S/P AVG excision 11/23/22  VITALS:  T(F): 98.2 (11-24-22 @ 10:18), Max: 98.8 (11-23-22 @ 15:41)  HR: 82 (11-24-22 @ 10:18)  BP: 181/95 (11-24-22 @ 10:18)  RR: 18 (11-24-22 @ 10:18)  SpO2: 100% (11-24-22 @ 10:18)  Wt(kg): --    11-23 @ 07:01  -  11-24 @ 07:00  --------------------------------------------------------  IN: 360 mL / OUT: 100 mL / NET: 260 mL      Physical Exam :-  Constitutional: NAD  Neck: Supple.  Respiratory: Bilateral equal breath sounds,  Cardiovascular: S1, S2 normal,  Gastrointestinal: Bowel Sounds present, soft, non tender.  Extremities: LUE with dressing  Neurological: Alert and Oriented x 3, no focal deficits  Psychiatric: Normal mood, normal affect  Data:-  Allergies :   aspirin (Swelling)  fish (Unknown)  penicillins (Swelling)  shellfish (Rash)    Hospital Medications:   MEDICATIONS  (STANDING):  ceFAZolin   IVPB 1000 milliGRAM(s) IV Intermittent every 24 hours  chlorhexidine 4% Liquid 1 Application(s) Topical <User Schedule>  epoetin cyndie-epbx (RETACRIT) Injectable 20000 Unit(s) IV Push <User Schedule>  heparin   Injectable 5000 Unit(s) SubCutaneous every 8 hours  HYDROmorphone PCA (1 mG/mL) 30 milliLiter(s) PCA Continuous PCA Continuous  mirtazapine 15 milliGRAM(s) Oral at bedtime  PARoxetine 20 milliGRAM(s) Oral daily  QUEtiapine 300 milliGRAM(s) Oral at bedtime  risperiDONE   Tablet 2 milliGRAM(s) Oral daily  sevelamer carbonate 800 milliGRAM(s) Oral three times a day with meals  simvastatin 40 milliGRAM(s) Oral at bedtime  sodium chloride 0.9%. 1000 milliLiter(s) (20 mL/Hr) IV Continuous <Continuous>    11-23    130<L>  |  89<L>  |  62<H>  ----------------------------<  167<H>  4.4   |  23  |  8.70<H>    Ca    9.2      23 Nov 2022 09:35  Phos  4.0     11-23  Mg     2.4     11-23    TPro  8.1  /  Alb  3.6  /  TBili  0.6  /  DBili      /  AST  18  /  ALT  11  /  AlkPhos  190<H>  11-23    Creatinine Trend: 8.70 <--, 10.50 <--, 13.97 <--, 13.50 <--, 12.50 <--                        8.5    9.42  )-----------( 159      ( 23 Nov 2022 09:35 )             27.1
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
## 2022-11-24 NOTE — PROGRESS NOTE ADULT - SUBJECTIVE AND OBJECTIVE BOX
Day 1 of Anesthesia Pain Management Service    SUBJECTIVE: Patient with IV PCA. Not using PCA. Arousable, but has mostly been sleeping since OR per RN.     Pain Scale Score:	[X] Refer to charted pain scores    THERAPY:    [ ] IV PCA Morphine		[ ] 5 mg/mL	[ ] 1 mg/mL  [X] IV PCA Hydromorphone	[ ] 5 mg/mL	[X] 1 mg/mL  [ ] IV PCA Fentanyl		[ ] 50 micrograms/mL    Demand dose: 0.2 mg     Lockout: 6 minutes   Continuous Rate: 0 mg/hr  4 Hour Limit: 4 mg    MEDICATIONS  (STANDING):  ceFAZolin   IVPB 1000 milliGRAM(s) IV Intermittent every 24 hours  chlorhexidine 4% Liquid 1 Application(s) Topical <User Schedule>  epoetin cyndie-epbx (RETACRIT) Injectable 35683 Unit(s) IV Push <User Schedule>  heparin   Injectable 5000 Unit(s) SubCutaneous every 8 hours  HYDROmorphone PCA (1 mG/mL) 30 milliLiter(s) PCA Continuous PCA Continuous  mirtazapine 15 milliGRAM(s) Oral at bedtime  PARoxetine 20 milliGRAM(s) Oral daily  QUEtiapine 300 milliGRAM(s) Oral at bedtime  risperiDONE   Tablet 2 milliGRAM(s) Oral daily  sevelamer carbonate 800 milliGRAM(s) Oral three times a day with meals  simvastatin 40 milliGRAM(s) Oral at bedtime  sodium chloride 0.9%. 1000 milliLiter(s) (20 mL/Hr) IV Continuous <Continuous>    MEDICATIONS  (PRN):  acetaminophen     Tablet .. 650 milliGRAM(s) Oral every 6 hours PRN Temp greater or equal to 38C (100.4F), Mild Pain (1 - 3)  nalbuphine Injectable 2.5 milliGRAM(s) IV Push every 6 hours PRN Pruritus  naloxone Injectable 0.1 milliGRAM(s) IV Push every 3 minutes PRN For ANY of the following changes in patient status:  A. RR LESS THAN 10 breaths per minute, B. Oxygen saturation LESS THAN 90%, C. Sedation score of 6  ondansetron Injectable 4 milliGRAM(s) IV Push every 6 hours PRN Nausea  ondansetron Injectable 4 milliGRAM(s) IV Push every 6 hours PRN Nausea and/or Vomiting  sodium chloride 0.9% lock flush 10 milliLiter(s) IV Push every 1 hour PRN Pre/post blood products, medications, blood draw, and to maintain line patency      OBJECTIVE:    Sedation Score:	[ ] Alert	[x ] Drowsy 	[ ] Arousable	[ ] Asleep	[ ] Unresponsive    Side Effects:	[X ] None	[ ] Nausea	[ ] Vomiting	[ ] Pruritus  		[ ] Other:    Vital Signs Last 24 Hrs  T(C): 36.9 (24 Nov 2022 06:03), Max: 37.1 (23 Nov 2022 15:41)  T(F): 98.4 (24 Nov 2022 06:03), Max: 98.8 (23 Nov 2022 15:41)  HR: 87 (24 Nov 2022 06:03) (81 - 92)  BP: 154/89 (24 Nov 2022 06:03) (142/83 - 236/108)  BP(mean): 103 (23 Nov 2022 22:30) (103 - 155)  RR: 18 (24 Nov 2022 06:03) (14 - 18)  SpO2: 100% (24 Nov 2022 06:03) (98% - 100%)    Parameters below as of 24 Nov 2022 06:03  Patient On (Oxygen Delivery Method): nasal cannula  O2 Flow (L/min): 4      ASSESSMENT/ PLAN    Therapy to  be:               [X] Continued   [ ] Discontinued   [ ] Changed to PRN Analgesics    Documentation and Verification of current medications:   [X] Done	[ ] Not done, not eligible    Comments: will continue for now joshua-operatively.

## 2022-11-24 NOTE — PROGRESS NOTE ADULT - SUBJECTIVE AND OBJECTIVE BOX
Follow Up: MSSA bacteremia     Interval History/ROS: Afebrile. Arm is sore. Feels ok.     Allergies  aspirin (Swelling)  fish (Unknown)  penicillins (Swelling)  shellfish (Rash)        ANTIMICROBIALS:  ceFAZolin   IVPB 1000 every 24 hours      OTHER MEDS:  acetaminophen     Tablet .. 650 milliGRAM(s) Oral every 6 hours PRN  chlorhexidine 4% Liquid 1 Application(s) Topical <User Schedule>  epoetin cyndie-epbx (RETACRIT) Injectable 75153 Unit(s) IV Push <User Schedule>  heparin   Injectable 5000 Unit(s) SubCutaneous every 8 hours  HYDROmorphone PCA (1 mG/mL) 30 milliLiter(s) PCA Continuous PCA Continuous  mirtazapine 15 milliGRAM(s) Oral at bedtime  nalbuphine Injectable 2.5 milliGRAM(s) IV Push every 6 hours PRN  naloxone Injectable 0.1 milliGRAM(s) IV Push every 3 minutes PRN  ondansetron Injectable 4 milliGRAM(s) IV Push every 6 hours PRN  ondansetron Injectable 4 milliGRAM(s) IV Push every 6 hours PRN  PARoxetine 20 milliGRAM(s) Oral daily  QUEtiapine 300 milliGRAM(s) Oral at bedtime  risperiDONE   Tablet 2 milliGRAM(s) Oral daily  sevelamer carbonate 800 milliGRAM(s) Oral three times a day with meals  simvastatin 40 milliGRAM(s) Oral at bedtime  sodium chloride 0.9% lock flush 10 milliLiter(s) IV Push every 1 hour PRN  sodium chloride 0.9%. 1000 milliLiter(s) IV Continuous <Continuous>      Vital Signs Last 24 Hrs  T(C): 36.8 (24 Nov 2022 10:18), Max: 37.1 (23 Nov 2022 15:41)  T(F): 98.2 (24 Nov 2022 10:18), Max: 98.8 (23 Nov 2022 15:41)  HR: 94 (24 Nov 2022 11:31) (81 - 94)  BP: 142/82 (24 Nov 2022 11:31) (142/82 - 236/108)  BP(mean): 103 (23 Nov 2022 22:30) (103 - 155)  RR: 18 (24 Nov 2022 10:18) (14 - 18)  SpO2: 100% (24 Nov 2022 10:18) (98% - 100%)    Parameters below as of 24 Nov 2022 10:18  Patient On (Oxygen Delivery Method): nasal cannula  O2 Flow (L/min): 4      Physical Exam:  General: non toxic  Cardio: regular rate   Respiratory: nonlabored on nasal cannula   abd: soft   Musculoskeletal: left arm dressed   vascular: right IJ CVC   Skin: no rash                          8.1    8.73  )-----------( 165      ( 24 Nov 2022 11:34 )             25.6       11-24    130<L>  |  87<L>  |  85<H>  ----------------------------<  148<H>  5.2   |  22  |  10.55<H>    Ca    8.9      24 Nov 2022 11:34  Phos  7.0     11-24  Mg     2.5     11-24    TPro  8.1  /  Alb  3.6  /  TBili  0.6  /  DBili  x   /  AST  18  /  ALT  11  /  AlkPhos  190<H>  11-23          MICROBIOLOGY:  Culture - Blood (collected 11-23-22 @ 09:35)  Source: .Blood Blood-Peripheral  Preliminary Report (11-24-22 @ 13:01):    No growth to date.    Culture - Blood (collected 11-23-22 @ 09:35)  Source: .Blood Blood-Peripheral  Preliminary Report (11-24-22 @ 13:01):    No growth to date.    Culture - Blood (collected 11-21-22 @ 03:27)  Source: .Blood Blood-Peripheral  Gram Stain (11-22-22 @ 04:57):    Growth in aerobic bottle: Gram Positive Cocci in Clusters    Growth in anaerobic bottle: Gram Positive Cocci in Clusters  Final Report (11-23-22 @ 14:30):    Growth in aerobic and anaerobic bottles: Staphylococcus aureus    See previous culture [id number]    Culture - Blood (collected 11-21-22 @ 03:27)  Source: .Blood Blood-Peripheral  Gram Stain (11-23-22 @ 08:35):    Growth in anaerobic bottle: Gram Positive Cocci in Clusters    Growth in aerobic bottle: Gram Positive Cocci in Clusters  Final Report (11-24-22 @ 10:47):    Growth in aerobic and anaerobic bottles: Staphylococcus aureus    ***Blood Panel PCR results on this specimen are available    approximately 3 hours after the Gram stain result.***    Gram stain, PCR, and/or culture results may not always    correspond dueto difference in methodologies.    ************************************************************    This PCR assay was performed by multiplex PCR. This    Assay tests for 66 bacterial and resistance gene targets.    Please refer to the [facility] test directory    at [email or web address] for details.  Organism: Blood Culture PCR  Staphylococcus aureus (11-24-22 @ 10:47)  Organism: Staphylococcus aureus (11-24-22 @ 10:47)      -  Ampicillin/Sulbactam: S <=8/4      -  Cefazolin: S <=4      -  Clindamycin: S <=0.25      -  Erythromycin: S <=0.25      -  Gentamicin: S <=1 Should not be used as monotherapy      -  Oxacillin: S <=0.25 Oxacillin predicts susceptibility for dicloxacillin, methicillin, and nafcillin      -  Penicillin: R 8      -  Rifampin: S <=1 Should not be used as monotherapy      -  Tetracycline: S <=1      -  Trimethoprim/Sulfamethoxazole: S <=0.5/9.5      -  Vancomycin: S 2      Method Type: LISA  Organism: Blood Culture PCR (11-24-22 @ 10:47)      -  Methicillin SENSITIVE Staphylococcus aureus (MSSA): Detec Any isolate of Staphylococcus aureus from a blood culture is NOT considered a contaminant.      Method Type: PCR    Culture - Blood (collected 11-19-22 @ 18:00)  Source: .Blood Blood-Peripheral  Gram Stain (11-20-22 @ 13:24):    Growth in aerobic bottle: Gram Positive Cocci in Clusters    Growth in anaerobic bottle: Gram Positive Cocci in Clusters  Final Report (11-23-22 @ 07:37):    Growth in aerobic and anaerobic bottles: Staphylococcus aureus    See previous culture [id number]    Culture - Blood (collected 11-19-22 @ 17:50)  Source: .Blood Blood-Peripheral  Gram Stain (11-20-22 @ 13:27):    Growth in aerobic bottle: Gram Positive Cocci in Clusters    Growth in anaerobic bottle: Gram Positive Cocci in Clusters  Final Report (11-23-22 @ 07:36):    Growth in aerobic and anaerobic bottles: Staphylococcus aureus    ***Blood Panel PCR results on this specimen are available    approximately 3 hours after the Gram stain result.***    Gram stain, PCR, and/or culture results may not always    correspond dueto difference in methodologies.    ************************************************************    This PCR assay was performed by multiplex PCR. This    Assay tests for 66 bacterial and resistance gene targets.    Please refer to the [facility] test directory    at [email or web address] for details.  Organism: Blood Culture PCR  Staphylococcus aureus (11-23-22 @ 07:36)  Organism: Staphylococcus aureus (11-23-22 @ 07:36)      -  Ampicillin/Sulbactam: S <=8/4      -  Cefazolin: S <=4      -  Clindamycin: S <=0.25      -  Erythromycin: S <=0.25      -  Gentamicin: S <=1 Should not be used as monotherapy      -  Oxacillin: S 0.5 Oxacillin predicts susceptibility for dicloxacillin, methicillin, and nafcillin      -  Penicillin: R >8      -  Rifampin: S <=1 Should not be used as monotherapy      -  Tetracycline: S <=1      -  Trimethoprim/Sulfamethoxazole: S <=0.5/9.5      -  Vancomycin: S 2      Method Type: LISA  Organism: Blood Culture PCR (11-23-22 @ 07:36)      -  Methicillin SENSITIVE Staphylococcus aureus (MSSA): Detec Any isolate of Staphylococcus aureus from a blood culture is NOT considered a contaminant.      Method Type: PCR    Rapid RVP Result: NotDetec (11-19 @ 17:44)      RADIOLOGY:  Images below reviewed personally  US Abdomen Upper Quadrant Right (11.22.22 @ 18:46)   Echogenic high density inthe portal confluence, likely nonocclusive   thrombus.  Heterogeneous hepatic echotexture.  Atrophic right kidney.

## 2022-11-24 NOTE — PROGRESS NOTE ADULT - SUBJECTIVE AND OBJECTIVE BOX
Surgery Progress Note     24hour Events: none    Subjective:  Patient seen and examined. Patient reports left arm pain but that pain is controlled with PCA.      Vital Signs:  Vital Signs Last 24 Hrs  T(C): 36.9 (24 Nov 2022 06:03), Max: 37.1 (23 Nov 2022 09:03)  T(F): 98.4 (24 Nov 2022 06:03), Max: 98.8 (23 Nov 2022 15:41)  HR: 87 (24 Nov 2022 06:03) (81 - 102)  BP: 154/89 (24 Nov 2022 06:03) (142/83 - 236/108)  BP(mean): 103 (23 Nov 2022 22:30) (103 - 155)  RR: 18 (24 Nov 2022 06:03) (14 - 18)  SpO2: 100% (24 Nov 2022 06:03) (93% - 100%)    Parameters below as of 24 Nov 2022 06:03  Patient On (Oxygen Delivery Method): nasal cannula  O2 Flow (L/min): 4      CAPILLARY BLOOD GLUCOSE      POCT Blood Glucose.: 126 mg/dL (23 Nov 2022 19:14)  POCT Blood Glucose.: 143 mg/dL (23 Nov 2022 15:40)      I&O's Detail    23 Nov 2022 07:01  -  24 Nov 2022 07:00  --------------------------------------------------------  IN:    Oral Fluid: 240 mL    sodium chloride 0.9%: 120 mL  Total IN: 360 mL    OUT:    Voided (mL): 100 mL  Total OUT: 100 mL    Total NET: 260 mL            Physical Exam:  General: NAD, resting comfortably in bed  HEENT: Normocephalic atraumatic  Respiratory: Nonlabored respirations  Cardio: pulse present  Extremities: RUE medial wound dressing changed on rounds, packed with betadine soaked kerlix and wrapped with ace      Labs:    11-23    130<L>  |  89<L>  |  62<H>  ----------------------------<  167<H>  4.4   |  23  |  8.70<H>    Ca    9.2      23 Nov 2022 09:35  Phos  4.0     11-23  Mg     2.4     11-23    TPro  8.1  /  Alb  3.6  /  TBili  0.6  /  DBili  x   /  AST  18  /  ALT  11  /  AlkPhos  190<H>  11-23    LIVER FUNCTIONS - ( 23 Nov 2022 09:35 )  Alb: 3.6 g/dL / Pro: 8.1 g/dL / ALK PHOS: 190 U/L / ALT: 11 U/L / AST: 18 U/L / GGT: 211 U/L                             8.5    9.42  )-----------( 159      ( 23 Nov 2022 09:35 )             27.1     PT/INR - ( 23 Nov 2022 09:35 )   PT: 13.4 sec;   INR: 1.15 ratio         PTT - ( 23 Nov 2022 09:35 )  PTT:30.0 sec     Surgery Progress Note     24hour Events: none    Subjective:  Patient seen and examined. Patient reports left arm pain but that pain is controlled with PCA.      Vital Signs:  Vital Signs Last 24 Hrs  T(C): 36.9 (24 Nov 2022 06:03), Max: 37.1 (23 Nov 2022 09:03)  T(F): 98.4 (24 Nov 2022 06:03), Max: 98.8 (23 Nov 2022 15:41)  HR: 87 (24 Nov 2022 06:03) (81 - 102)  BP: 154/89 (24 Nov 2022 06:03) (142/83 - 236/108)  BP(mean): 103 (23 Nov 2022 22:30) (103 - 155)  RR: 18 (24 Nov 2022 06:03) (14 - 18)  SpO2: 100% (24 Nov 2022 06:03) (93% - 100%)    Parameters below as of 24 Nov 2022 06:03  Patient On (Oxygen Delivery Method): nasal cannula  O2 Flow (L/min): 4      CAPILLARY BLOOD GLUCOSE      POCT Blood Glucose.: 126 mg/dL (23 Nov 2022 19:14)  POCT Blood Glucose.: 143 mg/dL (23 Nov 2022 15:40)      I&O's Detail    23 Nov 2022 07:01  -  24 Nov 2022 07:00  --------------------------------------------------------  IN:    Oral Fluid: 240 mL    sodium chloride 0.9%: 120 mL  Total IN: 360 mL    OUT:    Voided (mL): 100 mL  Total OUT: 100 mL    Total NET: 260 mL            Physical Exam:  General: sleeping but arousable  HEENT: Normocephalic atraumatic  Respiratory: Nonlabored respirations  Cardio: pulse present  Extremities: RUE medial wound dressing changed on rounds, packed with betadine soaked kerlix and wrapped with ace      Labs:    11-23    130<L>  |  89<L>  |  62<H>  ----------------------------<  167<H>  4.4   |  23  |  8.70<H>    Ca    9.2      23 Nov 2022 09:35  Phos  4.0     11-23  Mg     2.4     11-23    TPro  8.1  /  Alb  3.6  /  TBili  0.6  /  DBili  x   /  AST  18  /  ALT  11  /  AlkPhos  190<H>  11-23    LIVER FUNCTIONS - ( 23 Nov 2022 09:35 )  Alb: 3.6 g/dL / Pro: 8.1 g/dL / ALK PHOS: 190 U/L / ALT: 11 U/L / AST: 18 U/L / GGT: 211 U/L                             8.5    9.42  )-----------( 159      ( 23 Nov 2022 09:35 )             27.1     PT/INR - ( 23 Nov 2022 09:35 )   PT: 13.4 sec;   INR: 1.15 ratio         PTT - ( 23 Nov 2022 09:35 )  PTT:30.0 sec

## 2022-11-24 NOTE — PROGRESS NOTE ADULT - ASSESSMENT
42M BMI 48, COPD on home oxygen, hypertensive nephropathy on dialysis, bipolar disorder, schizophrenia.   Here 11/19 with sepsis, high grade MSSA bacteremia.   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   Sustained bacteremia not surprising.   Also suggests pulmonary septic emboli.   s/p graft excision 11/23.   Guarded long term prognosis.     Suggest  -Ancef 1GM IV q24h post dialysis   -f/u OR and blood cultures   -would check ALBINO next week   -anticipate eventual 6-week course of antibiotics     Discussed with vascular     Keven Eduardo MD   Infectious Disease   Available on TEAMS. After 5PM and on weekends please page fellow on call or call [phone]

## 2022-11-25 LAB
-  AMPICILLIN/SULBACTAM: SIGNIFICANT CHANGE UP
-  CEFAZOLIN: SIGNIFICANT CHANGE UP
-  CLINDAMYCIN: SIGNIFICANT CHANGE UP
-  ERYTHROMYCIN: SIGNIFICANT CHANGE UP
-  GENTAMICIN: SIGNIFICANT CHANGE UP
-  OXACILLIN: SIGNIFICANT CHANGE UP
-  PENICILLIN: SIGNIFICANT CHANGE UP
-  RIFAMPIN: SIGNIFICANT CHANGE UP
-  TETRACYCLINE: SIGNIFICANT CHANGE UP
-  TRIMETHOPRIM/SULFAMETHOXAZOLE: SIGNIFICANT CHANGE UP
-  VANCOMYCIN: SIGNIFICANT CHANGE UP
ANION GAP SERPL CALC-SCNC: 20 MMOL/L — HIGH (ref 5–17)
BUN SERPL-MCNC: 95 MG/DL — HIGH (ref 7–23)
CALCIUM SERPL-MCNC: 8.6 MG/DL — SIGNIFICANT CHANGE UP (ref 8.4–10.5)
CHLORIDE SERPL-SCNC: 88 MMOL/L — LOW (ref 96–108)
CO2 SERPL-SCNC: 21 MMOL/L — LOW (ref 22–31)
CREAT SERPL-MCNC: 12.11 MG/DL — HIGH (ref 0.5–1.3)
EGFR: 5 ML/MIN/1.73M2 — LOW
GLUCOSE SERPL-MCNC: 169 MG/DL — HIGH (ref 70–99)
GRAM STN FLD: SIGNIFICANT CHANGE UP
HCT VFR BLD CALC: 23.3 % — LOW (ref 39–50)
HGB BLD-MCNC: 7.3 G/DL — LOW (ref 13–17)
LKM AB SER-ACNC: <20.1 UNITS — SIGNIFICANT CHANGE UP (ref 0–20)
MAGNESIUM SERPL-MCNC: 2.7 MG/DL — HIGH (ref 1.6–2.6)
MCHC RBC-ENTMCNC: 31.3 GM/DL — LOW (ref 32–36)
MCHC RBC-ENTMCNC: 31.5 PG — SIGNIFICANT CHANGE UP (ref 27–34)
MCV RBC AUTO: 100.4 FL — HIGH (ref 80–100)
METHOD TYPE: SIGNIFICANT CHANGE UP
NRBC # BLD: 0 /100 WBCS — SIGNIFICANT CHANGE UP (ref 0–0)
PHOSPHATE SERPL-MCNC: 7.8 MG/DL — HIGH (ref 2.5–4.5)
PLATELET # BLD AUTO: 194 K/UL — SIGNIFICANT CHANGE UP (ref 150–400)
POTASSIUM SERPL-MCNC: 5.1 MMOL/L — SIGNIFICANT CHANGE UP (ref 3.5–5.3)
POTASSIUM SERPL-SCNC: 5.1 MMOL/L — SIGNIFICANT CHANGE UP (ref 3.5–5.3)
RBC # BLD: 2.32 M/UL — LOW (ref 4.2–5.8)
RBC # FLD: 16.5 % — HIGH (ref 10.3–14.5)
SMOOTH MUSCLE AB SER-ACNC: SIGNIFICANT CHANGE UP
SODIUM SERPL-SCNC: 129 MMOL/L — LOW (ref 135–145)
WBC # BLD: 9.34 K/UL — SIGNIFICANT CHANGE UP (ref 3.8–10.5)
WBC # FLD AUTO: 9.34 K/UL — SIGNIFICANT CHANGE UP (ref 3.8–10.5)

## 2022-11-25 PROCEDURE — 73030 X-RAY EXAM OF SHOULDER: CPT | Mod: 26,RT

## 2022-11-25 PROCEDURE — 71045 X-RAY EXAM CHEST 1 VIEW: CPT | Mod: 26

## 2022-11-25 PROCEDURE — 99232 SBSQ HOSP IP/OBS MODERATE 35: CPT

## 2022-11-25 RX ORDER — HYDROMORPHONE HYDROCHLORIDE 2 MG/ML
4 INJECTION INTRAMUSCULAR; INTRAVENOUS; SUBCUTANEOUS EVERY 4 HOURS
Refills: 0 | Status: DISCONTINUED | OUTPATIENT
Start: 2022-11-25 | End: 2022-11-25

## 2022-11-25 RX ORDER — HYDROMORPHONE HYDROCHLORIDE 2 MG/ML
30 INJECTION INTRAMUSCULAR; INTRAVENOUS; SUBCUTANEOUS
Refills: 0 | Status: DISCONTINUED | OUTPATIENT
Start: 2022-11-25 | End: 2022-11-26

## 2022-11-25 RX ORDER — HYDROMORPHONE HYDROCHLORIDE 2 MG/ML
2 INJECTION INTRAMUSCULAR; INTRAVENOUS; SUBCUTANEOUS EVERY 4 HOURS
Refills: 0 | Status: DISCONTINUED | OUTPATIENT
Start: 2022-11-25 | End: 2022-11-25

## 2022-11-25 RX ADMIN — HYDROMORPHONE HYDROCHLORIDE 30 MILLILITER(S): 2 INJECTION INTRAMUSCULAR; INTRAVENOUS; SUBCUTANEOUS at 14:36

## 2022-11-25 RX ADMIN — Medication 100 MILLIGRAM(S): at 18:24

## 2022-11-25 RX ADMIN — Medication 400 MILLIGRAM(S): at 05:38

## 2022-11-25 RX ADMIN — Medication 1000 MILLIGRAM(S): at 06:08

## 2022-11-25 RX ADMIN — SEVELAMER CARBONATE 800 MILLIGRAM(S): 2400 POWDER, FOR SUSPENSION ORAL at 18:26

## 2022-11-25 RX ADMIN — Medication 400 MILLIGRAM(S): at 18:24

## 2022-11-25 RX ADMIN — SEVELAMER CARBONATE 800 MILLIGRAM(S): 2400 POWDER, FOR SUSPENSION ORAL at 12:24

## 2022-11-25 RX ADMIN — Medication 1000 MILLIGRAM(S): at 14:00

## 2022-11-25 RX ADMIN — HEPARIN SODIUM 5000 UNIT(S): 5000 INJECTION INTRAVENOUS; SUBCUTANEOUS at 13:26

## 2022-11-25 RX ADMIN — Medication 400 MILLIGRAM(S): at 00:00

## 2022-11-25 RX ADMIN — Medication 400 MILLIGRAM(S): at 13:23

## 2022-11-25 RX ADMIN — HEPARIN SODIUM 5000 UNIT(S): 5000 INJECTION INTRAVENOUS; SUBCUTANEOUS at 05:38

## 2022-11-25 RX ADMIN — Medication 20 MILLIGRAM(S): at 12:25

## 2022-11-25 RX ADMIN — Medication 1000 MILLIGRAM(S): at 00:30

## 2022-11-25 RX ADMIN — RISPERIDONE 2 MILLIGRAM(S): 4 TABLET ORAL at 12:24

## 2022-11-25 RX ADMIN — HYDROMORPHONE HYDROCHLORIDE 30 MILLILITER(S): 2 INJECTION INTRAMUSCULAR; INTRAVENOUS; SUBCUTANEOUS at 08:34

## 2022-11-25 RX ADMIN — CHLORHEXIDINE GLUCONATE 1 APPLICATION(S): 213 SOLUTION TOPICAL at 05:38

## 2022-11-25 NOTE — PROGRESS NOTE ADULT - SUBJECTIVE AND OBJECTIVE BOX
Day __ of Anesthesia Pain Management Service    SUBJECTIVE: Patient is doing well with IV PCA    Pain Scale Score:	[X] Refer to charted pain scores    THERAPY:    [ ] IV PCA Morphine		[ ] 5 mg/mL	[ ] 1 mg/mL  [X] IV PCA Hydromorphone	[ ] 5 mg/mL	[X] 1 mg/mL  [ ] IV PCA Fentanyl		[ ] 50 micrograms/mL    Demand dose: 0.2 mg     Lockout: 6 minutes   Continuous Rate: 0 mg/hr  4 Hour Limit: 4 mg    MEDICATIONS  (STANDING):  acetaminophen   IVPB .. 1000 milliGRAM(s) IV Intermittent every 6 hours  ceFAZolin   IVPB 1000 milliGRAM(s) IV Intermittent every 24 hours  chlorhexidine 4% Liquid 1 Application(s) Topical <User Schedule>  epoetin cyndie-epbx (RETACRIT) Injectable 89666 Unit(s) IV Push <User Schedule>  heparin   Injectable 5000 Unit(s) SubCutaneous every 8 hours  mirtazapine 15 milliGRAM(s) Oral at bedtime  PARoxetine 20 milliGRAM(s) Oral daily  QUEtiapine 300 milliGRAM(s) Oral at bedtime  risperiDONE   Tablet 2 milliGRAM(s) Oral daily  sevelamer carbonate 800 milliGRAM(s) Oral three times a day with meals  simvastatin 40 milliGRAM(s) Oral at bedtime  sodium chloride 0.9%. 1000 milliLiter(s) (20 mL/Hr) IV Continuous <Continuous>    MEDICATIONS  (PRN):  HYDROmorphone   Tablet 2 milliGRAM(s) Oral every 4 hours PRN Moderate Pain (4 - 6)  HYDROmorphone   Tablet 4 milliGRAM(s) Oral every 4 hours PRN Severe Pain (7 - 10)  nalbuphine Injectable 2.5 milliGRAM(s) IV Push every 6 hours PRN Pruritus  naloxone Injectable 0.1 milliGRAM(s) IV Push every 3 minutes PRN For ANY of the following changes in patient status:  A. RR LESS THAN 10 breaths per minute, B. Oxygen saturation LESS THAN 90%, C. Sedation score of 6  ondansetron Injectable 4 milliGRAM(s) IV Push every 6 hours PRN Nausea  ondansetron Injectable 4 milliGRAM(s) IV Push every 6 hours PRN Nausea and/or Vomiting  sodium chloride 0.9% lock flush 10 milliLiter(s) IV Push every 1 hour PRN Pre/post blood products, medications, blood draw, and to maintain line patency      OBJECTIVE:    Sedation Score:	[ ] Alert	[ ] Drowsy 	[ ] Arousable	[X] Asleep	[ ] Unresponsive    Side Effects:	[X ] None	[ ] Nausea	[ ] Vomiting	[ ] Pruritus  		[ ] Other:    Vital Signs Last 24 Hrs  T(C): 36.7 (25 Nov 2022 09:13), Max: 37.8 (25 Nov 2022 01:21)  T(F): 98 (25 Nov 2022 09:13), Max: 100.1 (25 Nov 2022 01:21)  HR: 88 (25 Nov 2022 09:13) (86 - 94)  BP: 115/74 (25 Nov 2022 09:13) (115/74 - 168/95)  BP(mean): --  RR: 18 (25 Nov 2022 09:13) (18 - 18)  SpO2: 97% (25 Nov 2022 09:13) (95% - 100%)    Parameters below as of 25 Nov 2022 09:13  Patient On (Oxygen Delivery Method): nasal cannula  O2 Flow (L/min): 4      ASSESSMENT/ PLAN    Therapy to  be:               [ ] Continued   [X] Discontinued   [ ] Changed to PRN Analgesics    Documentation and Verification of current medications:   [X] Done	[ ] Not done, not eligible    Comments:  Recommend transitioning to PO analgesics

## 2022-11-25 NOTE — PROGRESS NOTE ADULT - SUBJECTIVE AND OBJECTIVE BOX
Patient seen and examined  no complaints    aspirin (Swelling)  fish (Unknown)  penicillins (Swelling)  shellfish (Rash)    Hospital Medications:   MEDICATIONS  (STANDING):  acetaminophen   IVPB .. 1000 milliGRAM(s) IV Intermittent every 6 hours  ceFAZolin   IVPB 1000 milliGRAM(s) IV Intermittent every 24 hours  chlorhexidine 4% Liquid 1 Application(s) Topical <User Schedule>  epoetin cyndie-epbx (RETACRIT) Injectable 12297 Unit(s) IV Push <User Schedule>  heparin   Injectable 5000 Unit(s) SubCutaneous every 8 hours  mirtazapine 15 milliGRAM(s) Oral at bedtime  PARoxetine 20 milliGRAM(s) Oral daily  QUEtiapine 300 milliGRAM(s) Oral at bedtime  risperiDONE   Tablet 2 milliGRAM(s) Oral daily  sevelamer carbonate 800 milliGRAM(s) Oral three times a day with meals  simvastatin 40 milliGRAM(s) Oral at bedtime  sodium chloride 0.9%. 1000 milliLiter(s) (20 mL/Hr) IV Continuous <Continuous>      VITALS:  T(F): 98 (11-25-22 @ 09:13), Max: 100.1 (11-25-22 @ 01:21)  HR: 88 (11-25-22 @ 09:13)  BP: 115/74 (11-25-22 @ 09:13)  RR: 18 (11-25-22 @ 09:13)  SpO2: 97% (11-25-22 @ 09:13)  Wt(kg): --    11-24 @ 07:01  -  11-25 @ 07:00  --------------------------------------------------------  IN: 1000 mL / OUT: 0 mL / NET: 1000 mL      Physical Exam :-  Constitutional: NAD  Neck: Supple.  Respiratory: Bilateral equal breath sounds,  Cardiovascular: S1, S2 normal,  Gastrointestinal: Bowel Sounds present, soft, non tender.  Extremities: LUE with dressing  Neurological: Alert and Oriented x 3, no focal deficits  Psychiatric: Normal mood, normal affect    LABS:  11-25    129<L>  |  88<L>  |  95<H>  ----------------------------<  169<H>  5.1   |  21<L>  |  12.11<H>    Ca    8.6      25 Nov 2022 08:44  Phos  7.8     11-25  Mg     2.7     11-25      Creatinine Trend: 12.11 <--, 10.55 <--, 8.70 <--, 10.50 <--, 13.97 <--, 13.50 <--, 12.50 <--                        7.3    9.34  )-----------( 194      ( 25 Nov 2022 08:44 )             23.3     Urine Studies:      RADIOLOGY & ADDITIONAL STUDIES:

## 2022-11-25 NOTE — PROGRESS NOTE ADULT - ASSESSMENT
42-year-old male with past medical history ESRD on HD (at home, M to F via AV graft), COPD on home oxygen 4L, bipolar disorder, schizophrenia presents with fever and chills x1 day. Patient states he had a fever of up to 104 at home and in [facility] was Tmax 100.6    ESRD on HD  Infected access, MSSA bacteremia, Septic Pulm emboli  S/P AVG excision 11/23/22  plan for HD Today (has home HD 5 times a week)  On Cefazolin per Infectious disease specialist  Has right IJ Selam  Will need ID clearance for Permacath Placement  Renal diet  Daily BMP    HTN  UF on HD  resume home BP med regimen  overall bp better    Anemia of CKD  Epo during HD  20k tiw with hd  trend hgb    BMD of CKD  Hyperphosphatemia  renvela with meals  trend etta phos    Hyponatremia  likely volume related  UF on HD  trend na      Dr Mauricio  [phone]

## 2022-11-25 NOTE — PROGRESS NOTE ADULT - SUBJECTIVE AND OBJECTIVE BOX
TEAM Surgery Progress Note  Patient is a 42y old  Male who presents with a chief complaint of fevers and chills (24 Nov 2022 13:39)      INTERVAL EVENTS: Patient complained of R shoulder pain overnight. Xrays normal.       OBJECTIVE:    Vital Signs Last 24 Hrs  T(C): 37.5 (25 Nov 2022 05:21), Max: 37.8 (25 Nov 2022 01:21)  T(F): 99.5 (25 Nov 2022 05:21), Max: 100.1 (25 Nov 2022 01:21)  HR: 93 (25 Nov 2022 05:21) (82 - 94)  BP: 129/75 (25 Nov 2022 05:21) (129/75 - 181/95)  BP(mean): --  RR: 18 (25 Nov 2022 05:21) (18 - 18)  SpO2: 96% (25 Nov 2022 05:21) (95% - 100%)    Parameters below as of 25 Nov 2022 05:21  Patient On (Oxygen Delivery Method): nasal cannula  O2 Flow (L/min): 4  I&O's Detail    24 Nov 2022 07:01  -  25 Nov 2022 07:00  --------------------------------------------------------  IN:    Oral Fluid: 780 mL    sodium chloride 0.9%: 220 mL  Total IN: 1000 mL    OUT:    Voided (mL): 0 mL  Total OUT: 0 mL    Total NET: 1000 mL      MEDICATIONS  (STANDING):  acetaminophen   IVPB .. 1000 milliGRAM(s) IV Intermittent every 6 hours  ceFAZolin   IVPB 1000 milliGRAM(s) IV Intermittent every 24 hours  chlorhexidine 4% Liquid 1 Application(s) Topical <User Schedule>  epoetin cyndie-epbx (RETACRIT) Injectable 60628 Unit(s) IV Push <User Schedule>  heparin   Injectable 5000 Unit(s) SubCutaneous every 8 hours  HYDROmorphone PCA (1 mG/mL) 30 milliLiter(s) PCA Continuous PCA Continuous  mirtazapine 15 milliGRAM(s) Oral at bedtime  PARoxetine 20 milliGRAM(s) Oral daily  QUEtiapine 300 milliGRAM(s) Oral at bedtime  risperiDONE   Tablet 2 milliGRAM(s) Oral daily  sevelamer carbonate 800 milliGRAM(s) Oral three times a day with meals  simvastatin 40 milliGRAM(s) Oral at bedtime  sodium chloride 0.9%. 1000 milliLiter(s) (20 mL/Hr) IV Continuous <Continuous>    MEDICATIONS  (PRN):  nalbuphine Injectable 2.5 milliGRAM(s) IV Push every 6 hours PRN Pruritus  naloxone Injectable 0.1 milliGRAM(s) IV Push every 3 minutes PRN For ANY of the following changes in patient status:  A. RR LESS THAN 10 breaths per minute, B. Oxygen saturation LESS THAN 90%, C. Sedation score of 6  ondansetron Injectable 4 milliGRAM(s) IV Push every 6 hours PRN Nausea  ondansetron Injectable 4 milliGRAM(s) IV Push every 6 hours PRN Nausea and/or Vomiting  sodium chloride 0.9% lock flush 10 milliLiter(s) IV Push every 1 hour PRN Pre/post blood products, medications, blood draw, and to maintain line patency      Physical Exam:  General: resting comfortably in bed  HEENT: Normocephalic atraumatic  Respiratory: Nonlabored respirations  Cardio: pulse present  Extremities: RUE medial wound dressing changed on rounds, packed with betadine soaked kerlix, covered with gauze and ABD, and wrapped with ace    LABS:                        8.1    8.73  )-----------( 165      ( 24 Nov 2022 11:34 )             25.6     11-24    130<L>  |  87<L>  |  85<H>  ----------------------------<  148<H>  5.2   |  22  |  10.55<H>    Ca    8.9      24 Nov 2022 11:34  Phos  7.0     11-24  Mg     2.5     11-24    TPro  8.1  /  Alb  3.6  /  TBili  0.6  /  DBili  x   /  AST  18  /  ALT  11  /  AlkPhos  190<H>  11-23    PT/INR - ( 23 Nov 2022 09:35 )   PT: 13.4 sec;   INR: 1.15 ratio         PTT - ( 23 Nov 2022 09:35 )  PTT:30.0 sec  LIVER FUNCTIONS - ( 23 Nov 2022 09:35 )  Alb: 3.6 g/dL / Pro: 8.1 g/dL / ALK PHOS: 190 U/L / ALT: 11 U/L / AST: 18 U/L / GGT: 211 U/L             IMAGING:

## 2022-11-25 NOTE — PROGRESS NOTE ADULT - ASSESSMENT
42M BMI 48, COPD on home oxygen, hypertensive nephropathy on dialysis, bipolar disorder, schizophrenia.   Here 11/19 with sepsis, high grade MSSA bacteremia.   From left arm AV PTFE graft s/p excision 11/23.   Culture clearing 11/23-24 and TTE without vegetations but CT suggests pulmonary septic emboli.   Stable.     Suggest  -Ancef 1GM IV q24h post dialysis   -monitor OR and blood cultures   -would still try for ALBINO next week   -anticipate eventual 6-week course of antibiotics from negative cultures     Keven Eduardo MD   Infectious Disease   Available on TEAMS. After 5PM and on weekends please page fellow on call or call [phone]

## 2022-11-25 NOTE — PROGRESS NOTE ADULT - SUBJECTIVE AND OBJECTIVE BOX
Day 1 of Anesthesia Pain Management Service    SUBJECTIVE:  Pain Scale Score:          [X] Refer to charted pain scores    THERAPY:    s/p neuraxial PF morphine    MEDICATIONS  (STANDING):  acetaminophen   IVPB .. 1000 milliGRAM(s) IV Intermittent every 6 hours  ceFAZolin   IVPB 1000 milliGRAM(s) IV Intermittent every 24 hours  chlorhexidine 4% Liquid 1 Application(s) Topical <User Schedule>  epoetin cyndie-epbx (RETACRIT) Injectable 22916 Unit(s) IV Push <User Schedule>  heparin   Injectable 5000 Unit(s) SubCutaneous every 8 hours  mirtazapine 15 milliGRAM(s) Oral at bedtime  PARoxetine 20 milliGRAM(s) Oral daily  QUEtiapine 300 milliGRAM(s) Oral at bedtime  risperiDONE   Tablet 2 milliGRAM(s) Oral daily  sevelamer carbonate 800 milliGRAM(s) Oral three times a day with meals  simvastatin 40 milliGRAM(s) Oral at bedtime  sodium chloride 0.9%. 1000 milliLiter(s) (20 mL/Hr) IV Continuous <Continuous>    MEDICATIONS  (PRN):  HYDROmorphone   Tablet 2 milliGRAM(s) Oral every 4 hours PRN Moderate Pain (4 - 6)  HYDROmorphone   Tablet 4 milliGRAM(s) Oral every 4 hours PRN Severe Pain (7 - 10)  nalbuphine Injectable 2.5 milliGRAM(s) IV Push every 6 hours PRN Pruritus  naloxone Injectable 0.1 milliGRAM(s) IV Push every 3 minutes PRN For ANY of the following changes in patient status:  A. RR LESS THAN 10 breaths per minute, B. Oxygen saturation LESS THAN 90%, C. Sedation score of 6  ondansetron Injectable 4 milliGRAM(s) IV Push every 6 hours PRN Nausea  ondansetron Injectable 4 milliGRAM(s) IV Push every 6 hours PRN Nausea and/or Vomiting  sodium chloride 0.9% lock flush 10 milliLiter(s) IV Push every 1 hour PRN Pre/post blood products, medications, blood draw, and to maintain line patency      OBJECTIVE:    Sedation:        	[ ] Alert	[X] Drowsy	[ ] Arousable      [ ] Asleep       [ ] Unresponsive    Side Effects:	[X] None	[ ] Nausea	[ ] Vomiting         [ ] Pruritus  		[ ] Weakness            [ ] Numbness	          [ ] Other:    Vital Signs Last 24 Hrs  T(C): 36.7 (25 Nov 2022 09:13), Max: 37.8 (25 Nov 2022 01:21)  T(F): 98 (25 Nov 2022 09:13), Max: 100.1 (25 Nov 2022 01:21)  HR: 88 (25 Nov 2022 09:13) (86 - 94)  BP: 115/74 (25 Nov 2022 09:13) (115/74 - 168/95)  BP(mean): --  RR: 18 (25 Nov 2022 09:13) (18 - 18)  SpO2: 97% (25 Nov 2022 09:13) (95% - 100%)    Parameters below as of 25 Nov 2022 09:13  Patient On (Oxygen Delivery Method): nasal cannula  O2 Flow (L/min): 4      ASSESSMENT/ PLAN  - Recommend d/c PCA and transition to prn PO analgesics  - Pain management per primary service, pain service to sign off   - Documentation and Verification of current medications
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
## 2022-11-25 NOTE — PROGRESS NOTE ADULT - SUBJECTIVE AND OBJECTIVE BOX
Follow Up: MSSA    Interval History/ROS: Cultures clearing. Feels pain isn't well controlled. No diarrhea.     Allergies  aspirin (Swelling)  fish (Unknown)  penicillins (Swelling)  shellfish (Rash)        ANTIMICROBIALS:  ceFAZolin   IVPB 1000 every 24 hours      OTHER MEDS:  acetaminophen   IVPB .. 1000 milliGRAM(s) IV Intermittent every 6 hours  chlorhexidine 4% Liquid 1 Application(s) Topical <User Schedule>  epoetin cyndie-epbx (RETACRIT) Injectable 38127 Unit(s) IV Push <User Schedule>  heparin   Injectable 5000 Unit(s) SubCutaneous every 8 hours  HYDROmorphone PCA (1 mG/mL) 30 milliLiter(s) PCA Continuous PCA Continuous  mirtazapine 15 milliGRAM(s) Oral at bedtime  nalbuphine Injectable 2.5 milliGRAM(s) IV Push every 6 hours PRN  naloxone Injectable 0.1 milliGRAM(s) IV Push every 3 minutes PRN  ondansetron Injectable 4 milliGRAM(s) IV Push every 6 hours PRN  ondansetron Injectable 4 milliGRAM(s) IV Push every 6 hours PRN  PARoxetine 20 milliGRAM(s) Oral daily  QUEtiapine 300 milliGRAM(s) Oral at bedtime  risperiDONE   Tablet 2 milliGRAM(s) Oral daily  sevelamer carbonate 800 milliGRAM(s) Oral three times a day with meals  simvastatin 40 milliGRAM(s) Oral at bedtime  sodium chloride 0.9% lock flush 10 milliLiter(s) IV Push every 1 hour PRN  sodium chloride 0.9%. 1000 milliLiter(s) IV Continuous <Continuous>      Vital Signs Last 24 Hrs  T(C): 36.7 (25 Nov 2022 13:03), Max: 37.8 (25 Nov 2022 01:21)  T(F): 98 (25 Nov 2022 13:03), Max: 100.1 (25 Nov 2022 01:21)  HR: 83 (25 Nov 2022 13:03) (83 - 93)  BP: 154/81 (25 Nov 2022 13:03) (115/74 - 168/95)  BP(mean): --  RR: 18 (25 Nov 2022 13:03) (18 - 18)  SpO2: 100% (25 Nov 2022 13:03) (96% - 100%)    Parameters below as of 25 Nov 2022 13:03  Patient On (Oxygen Delivery Method): nasal cannula  O2 Flow (L/min): 4      Physical Exam:  General: non toxic, obese, drift off to sleep mid conversation   Cardio: regular rate   Respiratory: nonlabored on nasal cannula   abd: nondistended  Musculoskeletal: left arm dressed   vascular: no phlebitis   Skin: no rash                          7.3    9.34  )-----------( 194      ( 25 Nov 2022 08:44 )             23.3       11-25    129<L>  |  88<L>  |  95<H>  ----------------------------<  169<H>  5.1   |  21<L>  |  12.11<H>    Ca    8.6      25 Nov 2022 08:44  Phos  7.8     11-25  Mg     2.7     11-25            MICROBIOLOGY:  Culture - Blood (collected 11-24-22 @ 11:57)  Source: .Blood Blood-Peripheral  Preliminary Report (11-25-22 @ 14:01):    No growth to date.    Culture - Blood (collected 11-24-22 @ 10:55)  Source: .Blood Blood-Peripheral  Preliminary Report (11-25-22 @ 14:01):    No growth to date.    Culture - Surgical Swab (collected 11-23-22 @ 22:02)  Source: .Surgical Swab LEFT ARM INFECTED FISTULA  Preliminary Report (11-24-22 @ 18:27):    Few Staphylococcus aureus    Culture - Blood (collected 11-23-22 @ 09:35)  Source: .Blood Blood-Peripheral  Preliminary Report (11-24-22 @ 13:01):    No growth to date.    Culture - Blood (collected 11-23-22 @ 09:35)  Source: .Blood Blood-Peripheral  Preliminary Report (11-24-22 @ 13:01):    No growth to date.    Culture - Blood (collected 11-21-22 @ 03:27)  Source: .Blood Blood-Peripheral  Gram Stain (11-22-22 @ 04:57):    Growth in aerobic bottle: Gram Positive Cocci in Clusters    Growth in anaerobic bottle: Gram Positive Cocci in Clusters  Final Report (11-23-22 @ 14:30):    Growth in aerobic and anaerobic bottles: Staphylococcus aureus    See previous culture [id number]    Culture - Blood (collected 11-21-22 @ 03:27)  Source: .Blood Blood-Peripheral  Gram Stain (11-23-22 @ 08:35):    Growth in anaerobic bottle: Gram Positive Cocci in Clusters    Growth in aerobic bottle: Gram Positive Cocci in Clusters  Final Report (11-24-22 @ 10:47):    Growth in aerobic and anaerobic bottles: Staphylococcus aureus    ***Blood Panel PCR results on this specimen are available    approximately 3 hours after the Gram stain result.***    Gram stain, PCR, and/or culture results may not always    correspond dueto difference in methodologies.    ************************************************************    This PCR assay was performed by multiplex PCR. This    Assay tests for 66 bacterial and resistance gene targets.    Please refer to the [facility] test directory    at [email or web address] for details.  Organism: Blood Culture PCR  Staphylococcus aureus (11-24-22 @ 10:47)  Organism: Staphylococcus aureus (11-24-22 @ 10:47)      -  Ampicillin/Sulbactam: S <=8/4      -  Cefazolin: S <=4      -  Clindamycin: S <=0.25      -  Erythromycin: S <=0.25      -  Gentamicin: S <=1 Should not be used as monotherapy      -  Oxacillin: S <=0.25 Oxacillin predicts susceptibility for dicloxacillin, methicillin, and nafcillin      -  Penicillin: R 8      -  Rifampin: S <=1 Should not be used as monotherapy      -  Tetracycline: S <=1      -  Trimethoprim/Sulfamethoxazole: S <=0.5/9.5      -  Vancomycin: S 2      Method Type: LISA  Organism: Blood Culture PCR (11-24-22 @ 10:47)      -  Methicillin SENSITIVE Staphylococcus aureus (MSSA): Detec Any isolate of Staphylococcus aureus from a blood culture is NOT considered a contaminant.      Method Type: PCR    Culture - Blood (collected 11-19-22 @ 18:00)  Source: .Blood Blood-Peripheral  Gram Stain (11-20-22 @ 13:24):    Growth in aerobic bottle: Gram Positive Cocci in Clusters    Growth in anaerobic bottle: Gram Positive Cocci in Clusters  Final Report (11-23-22 @ 07:37):    Growth in aerobic and anaerobic bottles: Staphylococcus aureus    See previous culture [id number]    Culture - Blood (collected 11-19-22 @ 17:50)  Source: .Blood Blood-Peripheral  Gram Stain (11-20-22 @ 13:27):    Growth in aerobic bottle: Gram Positive Cocci in Clusters    Growth in anaerobic bottle: Gram Positive Cocci in Clusters  Final Report (11-23-22 @ 07:36):    Growth in aerobic and anaerobic bottles: Staphylococcus aureus    ***Blood Panel PCR results on this specimen are available    approximately 3 hours after the Gram stain result.***    Gram stain, PCR, and/or culture results may not always    correspond dueto difference in methodologies.    ************************************************************    This PCR assay was performed by multiplex PCR. This    Assay tests for 66 bacterial and resistance gene targets.    Please refer to the [facility] test directory    at [email or web address] for details.  Organism: Blood Culture PCR  Staphylococcus aureus (11-23-22 @ 07:36)  Organism: Staphylococcus aureus (11-23-22 @ 07:36)      -  Ampicillin/Sulbactam: S <=8/4      -  Cefazolin: S <=4      -  Clindamycin: S <=0.25      -  Erythromycin: S <=0.25      -  Gentamicin: S <=1 Should not be used as monotherapy      -  Oxacillin: S 0.5 Oxacillin predicts susceptibility for dicloxacillin, methicillin, and nafcillin      -  Penicillin: R >8      -  Rifampin: S <=1 Should not be used as monotherapy      -  Tetracycline: S <=1      -  Trimethoprim/Sulfamethoxazole: S <=0.5/9.5      -  Vancomycin: S 2      Method Type: LISA  Organism: Blood Culture PCR (11-23-22 @ 07:36)      -  Methicillin SENSITIVE Staphylococcus aureus (MSSA): Detec Any isolate of Staphylococcus aureus from a blood culture is NOT considered a contaminant.      Method Type: PCR    Rapid RVP Result: NotDetec (11-19 @ 17:44)      RADIOLOGY:  Images below reviewed personally  Xray Chest 1 View- PORTABLE-Urgent (Xray Chest 1 View- PORTABLE-Urgent .) (11.25.22 @ 04:09)   Bilateral patchy interstitial opacities slightly worsened compared to   prior radiograph, representative of pulmonary edema.

## 2022-11-26 LAB
ANION GAP SERPL CALC-SCNC: 18 MMOL/L — HIGH (ref 5–17)
BUN SERPL-MCNC: 74 MG/DL — HIGH (ref 7–23)
CALCIUM SERPL-MCNC: 8.9 MG/DL — SIGNIFICANT CHANGE UP (ref 8.4–10.5)
CHLORIDE SERPL-SCNC: 90 MMOL/L — LOW (ref 96–108)
CO2 SERPL-SCNC: 23 MMOL/L — SIGNIFICANT CHANGE UP (ref 22–31)
CREAT SERPL-MCNC: 9.64 MG/DL — HIGH (ref 0.5–1.3)
EGFR: 6 ML/MIN/1.73M2 — LOW
GLUCOSE SERPL-MCNC: 167 MG/DL — HIGH (ref 70–99)
HCT VFR BLD CALC: 23.6 % — LOW (ref 39–50)
HGB BLD-MCNC: 7.4 G/DL — LOW (ref 13–17)
MAGNESIUM SERPL-MCNC: 2.3 MG/DL — SIGNIFICANT CHANGE UP (ref 1.6–2.6)
MCHC RBC-ENTMCNC: 31.4 GM/DL — LOW (ref 32–36)
MCHC RBC-ENTMCNC: 31.6 PG — SIGNIFICANT CHANGE UP (ref 27–34)
MCV RBC AUTO: 100.9 FL — HIGH (ref 80–100)
NRBC # BLD: 0 /100 WBCS — SIGNIFICANT CHANGE UP (ref 0–0)
PHOSPHATE SERPL-MCNC: 5.9 MG/DL — HIGH (ref 2.5–4.5)
PLATELET # BLD AUTO: 246 K/UL — SIGNIFICANT CHANGE UP (ref 150–400)
POTASSIUM SERPL-MCNC: 4.4 MMOL/L — SIGNIFICANT CHANGE UP (ref 3.5–5.3)
POTASSIUM SERPL-SCNC: 4.4 MMOL/L — SIGNIFICANT CHANGE UP (ref 3.5–5.3)
RBC # BLD: 2.34 M/UL — LOW (ref 4.2–5.8)
RBC # FLD: 16.1 % — HIGH (ref 10.3–14.5)
SODIUM SERPL-SCNC: 131 MMOL/L — LOW (ref 135–145)
WBC # BLD: 10.81 K/UL — HIGH (ref 3.8–10.5)
WBC # FLD AUTO: 10.81 K/UL — HIGH (ref 3.8–10.5)

## 2022-11-26 PROCEDURE — 93010 ELECTROCARDIOGRAM REPORT: CPT

## 2022-11-26 PROCEDURE — 99232 SBSQ HOSP IP/OBS MODERATE 35: CPT

## 2022-11-26 RX ORDER — ERYTHROPOIETIN 10000 [IU]/ML
20000 INJECTION, SOLUTION INTRAVENOUS; SUBCUTANEOUS ONCE
Refills: 0 | Status: COMPLETED | OUTPATIENT
Start: 2022-11-26 | End: 2022-11-26

## 2022-11-26 RX ORDER — ACETAMINOPHEN 500 MG
1000 TABLET ORAL EVERY 6 HOURS
Refills: 0 | Status: COMPLETED | OUTPATIENT
Start: 2022-11-26 | End: 2022-11-27

## 2022-11-26 RX ORDER — HYDROMORPHONE HYDROCHLORIDE 2 MG/ML
4 INJECTION INTRAMUSCULAR; INTRAVENOUS; SUBCUTANEOUS EVERY 4 HOURS
Refills: 0 | Status: DISCONTINUED | OUTPATIENT
Start: 2022-11-26 | End: 2022-11-28

## 2022-11-26 RX ORDER — HYDROMORPHONE HYDROCHLORIDE 2 MG/ML
2 INJECTION INTRAMUSCULAR; INTRAVENOUS; SUBCUTANEOUS EVERY 4 HOURS
Refills: 0 | Status: DISCONTINUED | OUTPATIENT
Start: 2022-11-26 | End: 2022-11-28

## 2022-11-26 RX ORDER — LIDOCAINE 4 G/100G
1 CREAM TOPICAL ONCE
Refills: 0 | Status: DISCONTINUED | OUTPATIENT
Start: 2022-11-26 | End: 2022-11-30

## 2022-11-26 RX ORDER — HYDROMORPHONE HYDROCHLORIDE 2 MG/ML
2.5 INJECTION INTRAMUSCULAR; INTRAVENOUS; SUBCUTANEOUS ONCE
Refills: 0 | Status: DISCONTINUED | OUTPATIENT
Start: 2022-11-26 | End: 2022-11-26

## 2022-11-26 RX ORDER — HYDROMORPHONE HYDROCHLORIDE 2 MG/ML
30 INJECTION INTRAMUSCULAR; INTRAVENOUS; SUBCUTANEOUS
Refills: 0 | Status: DISCONTINUED | OUTPATIENT
Start: 2022-11-26 | End: 2022-11-26

## 2022-11-26 RX ADMIN — QUETIAPINE FUMARATE 300 MILLIGRAM(S): 200 TABLET, FILM COATED ORAL at 00:11

## 2022-11-26 RX ADMIN — CHLORHEXIDINE GLUCONATE 1 APPLICATION(S): 213 SOLUTION TOPICAL at 05:30

## 2022-11-26 RX ADMIN — Medication 20 MILLIGRAM(S): at 12:26

## 2022-11-26 RX ADMIN — HYDROMORPHONE HYDROCHLORIDE 4 MILLIGRAM(S): 2 INJECTION INTRAMUSCULAR; INTRAVENOUS; SUBCUTANEOUS at 09:08

## 2022-11-26 RX ADMIN — SEVELAMER CARBONATE 800 MILLIGRAM(S): 2400 POWDER, FOR SUSPENSION ORAL at 08:09

## 2022-11-26 RX ADMIN — MIRTAZAPINE 15 MILLIGRAM(S): 45 TABLET, ORALLY DISINTEGRATING ORAL at 21:21

## 2022-11-26 RX ADMIN — HEPARIN SODIUM 5000 UNIT(S): 5000 INJECTION INTRAVENOUS; SUBCUTANEOUS at 00:11

## 2022-11-26 RX ADMIN — HYDROMORPHONE HYDROCHLORIDE 4 MILLIGRAM(S): 2 INJECTION INTRAMUSCULAR; INTRAVENOUS; SUBCUTANEOUS at 08:08

## 2022-11-26 RX ADMIN — SEVELAMER CARBONATE 800 MILLIGRAM(S): 2400 POWDER, FOR SUSPENSION ORAL at 17:55

## 2022-11-26 RX ADMIN — HEPARIN SODIUM 5000 UNIT(S): 5000 INJECTION INTRAVENOUS; SUBCUTANEOUS at 13:00

## 2022-11-26 RX ADMIN — HYDROMORPHONE HYDROCHLORIDE 30 MILLILITER(S): 2 INJECTION INTRAMUSCULAR; INTRAVENOUS; SUBCUTANEOUS at 04:20

## 2022-11-26 RX ADMIN — HYDROMORPHONE HYDROCHLORIDE 4 MILLIGRAM(S): 2 INJECTION INTRAMUSCULAR; INTRAVENOUS; SUBCUTANEOUS at 17:54

## 2022-11-26 RX ADMIN — SEVELAMER CARBONATE 800 MILLIGRAM(S): 2400 POWDER, FOR SUSPENSION ORAL at 12:26

## 2022-11-26 RX ADMIN — HEPARIN SODIUM 5000 UNIT(S): 5000 INJECTION INTRAVENOUS; SUBCUTANEOUS at 05:27

## 2022-11-26 RX ADMIN — SIMVASTATIN 40 MILLIGRAM(S): 20 TABLET, FILM COATED ORAL at 00:11

## 2022-11-26 RX ADMIN — RISPERIDONE 2 MILLIGRAM(S): 4 TABLET ORAL at 12:26

## 2022-11-26 RX ADMIN — HYDROMORPHONE HYDROCHLORIDE 4 MILLIGRAM(S): 2 INJECTION INTRAMUSCULAR; INTRAVENOUS; SUBCUTANEOUS at 13:25

## 2022-11-26 RX ADMIN — HYDROMORPHONE HYDROCHLORIDE 2 MILLIGRAM(S): 2 INJECTION INTRAMUSCULAR; INTRAVENOUS; SUBCUTANEOUS at 09:34

## 2022-11-26 RX ADMIN — MIRTAZAPINE 15 MILLIGRAM(S): 45 TABLET, ORALLY DISINTEGRATING ORAL at 00:11

## 2022-11-26 RX ADMIN — QUETIAPINE FUMARATE 300 MILLIGRAM(S): 200 TABLET, FILM COATED ORAL at 21:21

## 2022-11-26 RX ADMIN — HEPARIN SODIUM 5000 UNIT(S): 5000 INJECTION INTRAVENOUS; SUBCUTANEOUS at 21:21

## 2022-11-26 RX ADMIN — HYDROMORPHONE HYDROCHLORIDE 2 MILLIGRAM(S): 2 INJECTION INTRAMUSCULAR; INTRAVENOUS; SUBCUTANEOUS at 10:34

## 2022-11-26 RX ADMIN — HYDROMORPHONE HYDROCHLORIDE 4 MILLIGRAM(S): 2 INJECTION INTRAMUSCULAR; INTRAVENOUS; SUBCUTANEOUS at 18:54

## 2022-11-26 RX ADMIN — Medication 400 MILLIGRAM(S): at 17:54

## 2022-11-26 RX ADMIN — HYDROMORPHONE HYDROCHLORIDE 4 MILLIGRAM(S): 2 INJECTION INTRAMUSCULAR; INTRAVENOUS; SUBCUTANEOUS at 12:25

## 2022-11-26 RX ADMIN — Medication 100 MILLIGRAM(S): at 17:55

## 2022-11-26 RX ADMIN — SIMVASTATIN 40 MILLIGRAM(S): 20 TABLET, FILM COATED ORAL at 21:21

## 2022-11-26 RX ADMIN — ERYTHROPOIETIN 20000 UNIT(S): 10000 INJECTION, SOLUTION INTRAVENOUS; SUBCUTANEOUS at 16:59

## 2022-11-26 RX ADMIN — Medication 400 MILLIGRAM(S): at 10:55

## 2022-11-26 NOTE — PROGRESS NOTE ADULT - SUBJECTIVE AND OBJECTIVE BOX
TEAM Surgery Progress Note  Patient is a 42y old  Male who presents with a chief complaint of fevers and chills (25 Nov 2022 15:19)      SUBJECTIVE: Patient seen and examined at bedside with surgical team, patient resting in bed, complaining of LUE pain. + blood cultures. Denies fever, chills, CP, SOB nausea, vomiting, abdominal pain.    OBJECTIVE:    Vital Signs Last 24 Hrs  T(C): 38 (26 Nov 2022 05:19), Max: 38 (26 Nov 2022 05:19)  T(F): 100.4 (26 Nov 2022 05:19), Max: 100.4 (26 Nov 2022 05:19)  HR: 107 (26 Nov 2022 05:19) (76 - 107)  BP: 126/74 (26 Nov 2022 05:19) (115/74 - 219/104)  BP(mean): --  RR: 18 (26 Nov 2022 05:19) (16 - 18)  SpO2: 93% (26 Nov 2022 05:19) (93% - 100%)    Parameters below as of 26 Nov 2022 05:19  Patient On (Oxygen Delivery Method): nasal cannula  O2 Flow (L/min): 4  I&O's Detail    25 Nov 2022 07:01  -  26 Nov 2022 07:00  --------------------------------------------------------  IN:    IV PiggyBack: 250 mL    Oral Fluid: 580 mL    Other (mL): 800 mL    sodium chloride 0.9%: 240 mL  Total IN: 1870 mL    OUT:    Other (mL): 4800 mL    Voided (mL): 0 mL  Total OUT: 4800 mL    Total NET: -2930 mL      MEDICATIONS  (STANDING):  acetaminophen   IVPB .. 1000 milliGRAM(s) IV Intermittent every 6 hours  ceFAZolin   IVPB 1000 milliGRAM(s) IV Intermittent every 24 hours  chlorhexidine 4% Liquid 1 Application(s) Topical <User Schedule>  epoetin cyndie-epbx (RETACRIT) Injectable 36351 Unit(s) IV Push <User Schedule>  heparin   Injectable 5000 Unit(s) SubCutaneous every 8 hours  mirtazapine 15 milliGRAM(s) Oral at bedtime  PARoxetine 20 milliGRAM(s) Oral daily  QUEtiapine 300 milliGRAM(s) Oral at bedtime  risperiDONE   Tablet 2 milliGRAM(s) Oral daily  sevelamer carbonate 800 milliGRAM(s) Oral three times a day with meals  simvastatin 40 milliGRAM(s) Oral at bedtime  sodium chloride 0.9%. 1000 milliLiter(s) (20 mL/Hr) IV Continuous <Continuous>    MEDICATIONS  (PRN):  HYDROmorphone   Tablet 2 milliGRAM(s) Oral every 4 hours PRN Moderate Pain (4 - 6)  HYDROmorphone   Tablet 4 milliGRAM(s) Oral every 4 hours PRN Severe Pain (7 - 10)  nalbuphine Injectable 2.5 milliGRAM(s) IV Push every 6 hours PRN Pruritus  naloxone Injectable 0.1 milliGRAM(s) IV Push every 3 minutes PRN For ANY of the following changes in patient status:  A. RR LESS THAN 10 breaths per minute, B. Oxygen saturation LESS THAN 90%, C. Sedation score of 6  ondansetron Injectable 4 milliGRAM(s) IV Push every 6 hours PRN Nausea and/or Vomiting  ondansetron Injectable 4 milliGRAM(s) IV Push every 6 hours PRN Nausea  sodium chloride 0.9% lock flush 10 milliLiter(s) IV Push every 1 hour PRN Pre/post blood products, medications, blood draw, and to maintain line patency    Physical Exam:  General: resting comfortably in bed  HEENT: Normocephalic atraumatic  Respiratory: Nonlabored respirations  Cardio: pulse present  Extremities: RUE with wound vac to suction.       LABS:                        7.3    9.34  )-----------( 194      ( 25 Nov 2022 08:44 )             23.3     11-25    129<L>  |  88<L>  |  95<H>  ----------------------------<  169<H>  5.1   |  21<L>  |  12.11<H>    Ca    8.6      25 Nov 2022 08:44  Phos  7.8     11-25  Mg     2.7     11-25                IMAGING:

## 2022-11-26 NOTE — PROGRESS NOTE ADULT - ASSESSMENT
42M BMI 48, COPD on home oxygen, hypertensive nephropathy on dialysis, bipolar disorder, schizophrenia.   Here 11/19 with sepsis, high grade MSSA bacteremia.   From left arm AV PTFE graft s/p excision 11/23.   TTE without vegetations but CT suggests pulmonary septic emboli.   Cultures 11/24 still with growth but less.   Stable.     Suggest  -Ancef 1GM IV q24h post dialysis   -repeat blood cultures tomorrow   -ALBINO next week   -anticipate eventual 6-week course of antibiotics from negative cultures     Discussed with surgery     Keven Eduardo MD   Infectious Disease   Available on TEAMS. After 5PM and on weekends please page fellow on call or call [phone]

## 2022-11-26 NOTE — PROGRESS NOTE ADULT - ASSESSMENT
42-year-old male with past medical history ESRD on HD (at home, M to F via AV graft), COPD on home oxygen 4L, bipolar disorder, schizophrenia presents with fever and chills x1 day. Patient states he had a fever of up to 104 at home and in [place] ED was Tmax 100.6    ESRD on HD  Infected access, MSSA bacteremia, Septic Pulm emboli  S/P AVG excision 11/23/22  plan for HD Today (has home HD 5 times a week)  On Cefazolin per Infectious disease specialist  Has right IJ Selam  Will need ID clearance for Permacath Placement  Renal diet  Daily BMP    HTN  UF on HD  resume home BP med regimen  overall bp better    Anemia of CKD  Epo during HD  20k tiw with hd  trend hgb    BMD of CKD  Hyperphosphatemia  renvela with meals  trend etta phos    Hyponatremia  likely volume related  UF on HD  trend na      For any question, call:  Cell # [phone]  Pager # [phone]  Callback # [phone]
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
## 2022-11-26 NOTE — PROGRESS NOTE ADULT - SUBJECTIVE AND OBJECTIVE BOX
[facility] - HUSSAIN Sanchez / HUSSAIN Villalobos / ERNESTO Mehta/ HUSSAIN Mauricio/ HUSSAIN Barber/ IVONNE Hui / JADEN La / BRANDON Alexander  ---------------------------------------------------------------------------------------------------------------  seen and examined today for ESRD  Interval : NAD  VITALS:  T(F): 99.5 (11-26-22 @ 09:57), Max: 100.4 (11-26-22 @ 05:19)  HR: 106 (11-26-22 @ 09:57)  BP: 143/83 (11-26-22 @ 09:57)  RR: 18 (11-26-22 @ 09:57)  SpO2: 91% (11-26-22 @ 09:57)  Wt(kg): --    11-25 @ 07:01  -  11-26 @ 07:00  --------------------------------------------------------  IN: 2130 mL / OUT: 4800 mL / NET: -2670 mL    11-26 @ 07:01  -  11-26 @ 11:45  --------------------------------------------------------  IN: 160 mL / OUT: 0 mL / NET: 160 mL      Physical Exam :-  Constitutional: NAD  Neck: Supple.  Respiratory: Bilateral equal breath sounds,  Cardiovascular: S1, S2 normal,  Gastrointestinal: Bowel Sounds present, soft, non tender.  Extremities: trace edema, LUE clean dressing  Neurological: Alert and Oriented x 3, no focal deficits  Psychiatric: Normal mood, normal affect  Data:-  Allergies :   aspirin (Swelling)  fish (Unknown)  penicillins (Swelling)  shellfish (Rash)    Hospital Medications:   MEDICATIONS  (STANDING):  acetaminophen   IVPB .. 1000 milliGRAM(s) IV Intermittent every 6 hours  ceFAZolin   IVPB 1000 milliGRAM(s) IV Intermittent every 24 hours  chlorhexidine 4% Liquid 1 Application(s) Topical <User Schedule>  epoetin cyndie-epbx (RETACRIT) Injectable 59141 Unit(s) IV Push <User Schedule>  heparin   Injectable 5000 Unit(s) SubCutaneous every 8 hours  mirtazapine 15 milliGRAM(s) Oral at bedtime  PARoxetine 20 milliGRAM(s) Oral daily  QUEtiapine 300 milliGRAM(s) Oral at bedtime  risperiDONE   Tablet 2 milliGRAM(s) Oral daily  sevelamer carbonate 800 milliGRAM(s) Oral three times a day with meals  simvastatin 40 milliGRAM(s) Oral at bedtime  sodium chloride 0.9%. 1000 milliLiter(s) (20 mL/Hr) IV Continuous <Continuous>    11-25    129<L>  |  88<L>  |  95<H>  ----------------------------<  169<H>  5.1   |  21<L>  |  12.11<H>    Ca    8.6      25 Nov 2022 08:44  Phos  7.8     11-25  Mg     2.7     11-25      Creatinine Trend: 12.11 <--, 10.55 <--, 8.70 <--, 10.50 <--, 13.97 <--, 13.50 <--, 12.50 <--                        7.3    9.34  )-----------( 194      ( 25 Nov 2022 08:44 )             23.3
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
## 2022-11-26 NOTE — PROGRESS NOTE ADULT - SUBJECTIVE AND OBJECTIVE BOX
Day 3 of Anesthesia Pain Management Service    SUBJECTIVE: Patient is doing well with IV PCA    Pain Scale Score:	[X] Refer to charted pain scores    THERAPY:    [ ] IV PCA Morphine		[ ] 5 mg/mL	[ ] 1 mg/mL  [X] IV PCA Hydromorphone	[ ] 5 mg/mL	[X] 1 mg/mL  [ ] IV PCA Fentanyl		[ ] 50 micrograms/mL    Demand dose: 0.2 mg     Lockout: 6 minutes   Continuous Rate: 0 mg/hr  4 Hour Limit: 4 mg    MEDICATIONS  (STANDING):  acetaminophen   IVPB .. 1000 milliGRAM(s) IV Intermittent every 6 hours  ceFAZolin   IVPB 1000 milliGRAM(s) IV Intermittent every 24 hours  chlorhexidine 4% Liquid 1 Application(s) Topical <User Schedule>  epoetin cyndie-epbx (RETACRIT) Injectable 78379 Unit(s) IV Push <User Schedule>  heparin   Injectable 5000 Unit(s) SubCutaneous every 8 hours  mirtazapine 15 milliGRAM(s) Oral at bedtime  PARoxetine 20 milliGRAM(s) Oral daily  QUEtiapine 300 milliGRAM(s) Oral at bedtime  risperiDONE   Tablet 2 milliGRAM(s) Oral daily  sevelamer carbonate 800 milliGRAM(s) Oral three times a day with meals  simvastatin 40 milliGRAM(s) Oral at bedtime  sodium chloride 0.9%. 1000 milliLiter(s) (20 mL/Hr) IV Continuous <Continuous>    MEDICATIONS  (PRN):  HYDROmorphone   Tablet 2 milliGRAM(s) Oral every 4 hours PRN Moderate Pain (4 - 6)  HYDROmorphone   Tablet 4 milliGRAM(s) Oral every 4 hours PRN Severe Pain (7 - 10)  nalbuphine Injectable 2.5 milliGRAM(s) IV Push every 6 hours PRN Pruritus  naloxone Injectable 0.1 milliGRAM(s) IV Push every 3 minutes PRN For ANY of the following changes in patient status:  A. RR LESS THAN 10 breaths per minute, B. Oxygen saturation LESS THAN 90%, C. Sedation score of 6  ondansetron Injectable 4 milliGRAM(s) IV Push every 6 hours PRN Nausea  ondansetron Injectable 4 milliGRAM(s) IV Push every 6 hours PRN Nausea and/or Vomiting  sodium chloride 0.9% lock flush 10 milliLiter(s) IV Push every 1 hour PRN Pre/post blood products, medications, blood draw, and to maintain line patency      OBJECTIVE:    Sedation Score:	[ X] Alert	[ ] Drowsy 	[ ] Arousable	[ ] Asleep	[ ] Unresponsive    Side Effects:	[X ] None	[ ] Nausea	[ ] Vomiting	[ ] Pruritus  		[ ] Other:    Vital Signs Last 24 Hrs  T(C): 38 (26 Nov 2022 05:19), Max: 38 (26 Nov 2022 05:19)  T(F): 100.4 (26 Nov 2022 05:19), Max: 100.4 (26 Nov 2022 05:19)  HR: 107 (26 Nov 2022 05:19) (76 - 107)  BP: 126/74 (26 Nov 2022 05:19) (126/74 - 219/104)  BP(mean): --  RR: 18 (26 Nov 2022 05:19) (16 - 18)  SpO2: 93% (26 Nov 2022 05:19) (93% - 100%)    Parameters below as of 26 Nov 2022 05:19  Patient On (Oxygen Delivery Method): nasal cannula  O2 Flow (L/min): 4      ASSESSMENT/ PLAN    Therapy to  be:               [ ] Continued   [X] Discontinued   [ ] Changed to PRN Analgesics    Documentation and Verification of current medications:   [X] Done	[ ] Not done, not eligible    Comments:

## 2022-11-27 LAB
-  AMPICILLIN/SULBACTAM: SIGNIFICANT CHANGE UP
-  CEFAZOLIN: SIGNIFICANT CHANGE UP
-  CLINDAMYCIN: SIGNIFICANT CHANGE UP
-  ERYTHROMYCIN: SIGNIFICANT CHANGE UP
-  GENTAMICIN: SIGNIFICANT CHANGE UP
-  OXACILLIN: SIGNIFICANT CHANGE UP
-  PENICILLIN: SIGNIFICANT CHANGE UP
-  RIFAMPIN: SIGNIFICANT CHANGE UP
-  TETRACYCLINE: SIGNIFICANT CHANGE UP
-  TRIMETHOPRIM/SULFAMETHOXAZOLE: SIGNIFICANT CHANGE UP
-  VANCOMYCIN: SIGNIFICANT CHANGE UP
ANION GAP SERPL CALC-SCNC: 17 MMOL/L — SIGNIFICANT CHANGE UP (ref 5–17)
BUN SERPL-MCNC: 57 MG/DL — HIGH (ref 7–23)
CALCIUM SERPL-MCNC: 9.4 MG/DL — SIGNIFICANT CHANGE UP (ref 8.4–10.5)
CHLORIDE SERPL-SCNC: 90 MMOL/L — LOW (ref 96–108)
CO2 SERPL-SCNC: 22 MMOL/L — SIGNIFICANT CHANGE UP (ref 22–31)
CREAT SERPL-MCNC: 8.46 MG/DL — HIGH (ref 0.5–1.3)
CULTURE RESULTS: SIGNIFICANT CHANGE UP
EGFR: 7 ML/MIN/1.73M2 — LOW
GLUCOSE SERPL-MCNC: 202 MG/DL — HIGH (ref 70–99)
GRAM STN FLD: SIGNIFICANT CHANGE UP
HCT VFR BLD CALC: 24.2 % — LOW (ref 39–50)
HGB BLD-MCNC: 7.6 G/DL — LOW (ref 13–17)
MAGNESIUM SERPL-MCNC: 2.2 MG/DL — SIGNIFICANT CHANGE UP (ref 1.6–2.6)
MCHC RBC-ENTMCNC: 31.4 GM/DL — LOW (ref 32–36)
MCHC RBC-ENTMCNC: 31.7 PG — SIGNIFICANT CHANGE UP (ref 27–34)
MCV RBC AUTO: 100.8 FL — HIGH (ref 80–100)
METHOD TYPE: SIGNIFICANT CHANGE UP
NRBC # BLD: 0 /100 WBCS — SIGNIFICANT CHANGE UP (ref 0–0)
ORGANISM # SPEC MICROSCOPIC CNT: SIGNIFICANT CHANGE UP
ORGANISM # SPEC MICROSCOPIC CNT: SIGNIFICANT CHANGE UP
PHOSPHATE SERPL-MCNC: 6.4 MG/DL — HIGH (ref 2.5–4.5)
PLATELET # BLD AUTO: 277 K/UL — SIGNIFICANT CHANGE UP (ref 150–400)
POTASSIUM SERPL-MCNC: 4.7 MMOL/L — SIGNIFICANT CHANGE UP (ref 3.5–5.3)
POTASSIUM SERPL-SCNC: 4.7 MMOL/L — SIGNIFICANT CHANGE UP (ref 3.5–5.3)
RBC # BLD: 2.4 M/UL — LOW (ref 4.2–5.8)
RBC # FLD: 16 % — HIGH (ref 10.3–14.5)
SODIUM SERPL-SCNC: 129 MMOL/L — LOW (ref 135–145)
SPECIMEN SOURCE: SIGNIFICANT CHANGE UP
WBC # BLD: 11.48 K/UL — HIGH (ref 3.8–10.5)
WBC # FLD AUTO: 11.48 K/UL — HIGH (ref 3.8–10.5)

## 2022-11-27 PROCEDURE — 99232 SBSQ HOSP IP/OBS MODERATE 35: CPT

## 2022-11-27 PROCEDURE — 71045 X-RAY EXAM CHEST 1 VIEW: CPT | Mod: 26

## 2022-11-27 RX ORDER — ACETAMINOPHEN 500 MG
1000 TABLET ORAL EVERY 6 HOURS
Refills: 0 | Status: DISCONTINUED | OUTPATIENT
Start: 2022-11-27 | End: 2022-11-30

## 2022-11-27 RX ORDER — BENZOCAINE AND MENTHOL 5; 1 G/100ML; G/100ML
1 LIQUID ORAL ONCE
Refills: 0 | Status: COMPLETED | OUTPATIENT
Start: 2022-11-27 | End: 2022-11-27

## 2022-11-27 RX ADMIN — HEPARIN SODIUM 5000 UNIT(S): 5000 INJECTION INTRAVENOUS; SUBCUTANEOUS at 13:17

## 2022-11-27 RX ADMIN — HYDROMORPHONE HYDROCHLORIDE 4 MILLIGRAM(S): 2 INJECTION INTRAMUSCULAR; INTRAVENOUS; SUBCUTANEOUS at 21:45

## 2022-11-27 RX ADMIN — Medication 20 MILLIGRAM(S): at 13:17

## 2022-11-27 RX ADMIN — HYDROMORPHONE HYDROCHLORIDE 4 MILLIGRAM(S): 2 INJECTION INTRAMUSCULAR; INTRAVENOUS; SUBCUTANEOUS at 02:48

## 2022-11-27 RX ADMIN — Medication 400 MILLIGRAM(S): at 05:27

## 2022-11-27 RX ADMIN — HYDROMORPHONE HYDROCHLORIDE 4 MILLIGRAM(S): 2 INJECTION INTRAMUSCULAR; INTRAVENOUS; SUBCUTANEOUS at 03:39

## 2022-11-27 RX ADMIN — Medication 100 MILLIGRAM(S): at 17:07

## 2022-11-27 RX ADMIN — RISPERIDONE 2 MILLIGRAM(S): 4 TABLET ORAL at 13:17

## 2022-11-27 RX ADMIN — SEVELAMER CARBONATE 800 MILLIGRAM(S): 2400 POWDER, FOR SUSPENSION ORAL at 16:43

## 2022-11-27 RX ADMIN — Medication 1000 MILLIGRAM(S): at 06:19

## 2022-11-27 RX ADMIN — BENZOCAINE AND MENTHOL 1 LOZENGE: 5; 1 LIQUID ORAL at 20:46

## 2022-11-27 RX ADMIN — Medication 1000 MILLIGRAM(S): at 01:45

## 2022-11-27 RX ADMIN — HYDROMORPHONE HYDROCHLORIDE 4 MILLIGRAM(S): 2 INJECTION INTRAMUSCULAR; INTRAVENOUS; SUBCUTANEOUS at 17:43

## 2022-11-27 RX ADMIN — Medication 400 MILLIGRAM(S): at 00:50

## 2022-11-27 RX ADMIN — Medication 1000 MILLIGRAM(S): at 13:00

## 2022-11-27 RX ADMIN — HYDROMORPHONE HYDROCHLORIDE 4 MILLIGRAM(S): 2 INJECTION INTRAMUSCULAR; INTRAVENOUS; SUBCUTANEOUS at 10:40

## 2022-11-27 RX ADMIN — Medication 400 MILLIGRAM(S): at 12:12

## 2022-11-27 RX ADMIN — QUETIAPINE FUMARATE 300 MILLIGRAM(S): 200 TABLET, FILM COATED ORAL at 21:48

## 2022-11-27 RX ADMIN — SIMVASTATIN 40 MILLIGRAM(S): 20 TABLET, FILM COATED ORAL at 21:49

## 2022-11-27 RX ADMIN — MIRTAZAPINE 15 MILLIGRAM(S): 45 TABLET, ORALLY DISINTEGRATING ORAL at 21:49

## 2022-11-27 RX ADMIN — HYDROMORPHONE HYDROCHLORIDE 4 MILLIGRAM(S): 2 INJECTION INTRAMUSCULAR; INTRAVENOUS; SUBCUTANEOUS at 16:43

## 2022-11-27 RX ADMIN — HYDROMORPHONE HYDROCHLORIDE 4 MILLIGRAM(S): 2 INJECTION INTRAMUSCULAR; INTRAVENOUS; SUBCUTANEOUS at 20:46

## 2022-11-27 RX ADMIN — HYDROMORPHONE HYDROCHLORIDE 4 MILLIGRAM(S): 2 INJECTION INTRAMUSCULAR; INTRAVENOUS; SUBCUTANEOUS at 11:40

## 2022-11-27 RX ADMIN — HEPARIN SODIUM 5000 UNIT(S): 5000 INJECTION INTRAVENOUS; SUBCUTANEOUS at 05:27

## 2022-11-27 RX ADMIN — SEVELAMER CARBONATE 800 MILLIGRAM(S): 2400 POWDER, FOR SUSPENSION ORAL at 08:24

## 2022-11-27 RX ADMIN — SEVELAMER CARBONATE 800 MILLIGRAM(S): 2400 POWDER, FOR SUSPENSION ORAL at 13:16

## 2022-11-27 RX ADMIN — HEPARIN SODIUM 5000 UNIT(S): 5000 INJECTION INTRAVENOUS; SUBCUTANEOUS at 21:49

## 2022-11-27 RX ADMIN — CHLORHEXIDINE GLUCONATE 1 APPLICATION(S): 213 SOLUTION TOPICAL at 08:24
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
## 2022-11-27 NOTE — PROGRESS NOTE ADULT - SUBJECTIVE AND OBJECTIVE BOX
Follow Up: MSSA     Interval History/ROS: Afebrile. Feels better, less pain. No diarrhea.     Allergies  aspirin (Swelling)  fish (Unknown)  penicillins (Swelling)  shellfish (Rash)        ANTIMICROBIALS:  ceFAZolin   IVPB 1000 every 24 hours      OTHER MEDS:  acetaminophen   IVPB .. 1000 milliGRAM(s) IV Intermittent every 6 hours PRN  chlorhexidine 4% Liquid 1 Application(s) Topical <User Schedule>  epoetin cyndie-epbx (RETACRIT) Injectable 88236 Unit(s) IV Push <User Schedule>  heparin   Injectable 5000 Unit(s) SubCutaneous every 8 hours  HYDROmorphone   Tablet 2 milliGRAM(s) Oral every 4 hours PRN  HYDROmorphone   Tablet 4 milliGRAM(s) Oral every 4 hours PRN  lidocaine   4% Patch 1 Patch Transdermal once  mirtazapine 15 milliGRAM(s) Oral at bedtime  nalbuphine Injectable 2.5 milliGRAM(s) IV Push every 6 hours PRN  naloxone Injectable 0.1 milliGRAM(s) IV Push every 3 minutes PRN  ondansetron Injectable 4 milliGRAM(s) IV Push every 6 hours PRN  ondansetron Injectable 4 milliGRAM(s) IV Push every 6 hours PRN  PARoxetine 20 milliGRAM(s) Oral daily  QUEtiapine 300 milliGRAM(s) Oral at bedtime  risperiDONE   Tablet 2 milliGRAM(s) Oral daily  sevelamer carbonate 800 milliGRAM(s) Oral three times a day with meals  simvastatin 40 milliGRAM(s) Oral at bedtime  sodium chloride 0.9% lock flush 10 milliLiter(s) IV Push every 1 hour PRN  sodium chloride 0.9%. 1000 milliLiter(s) IV Continuous <Continuous>      Vital Signs Last 24 Hrs  T(C): 36.8 (27 Nov 2022 12:57), Max: 37.1 (26 Nov 2022 17:58)  T(F): 98.2 (27 Nov 2022 12:57), Max: 98.7 (26 Nov 2022 17:58)  HR: 87 (27 Nov 2022 12:57) (77 - 93)  BP: 112/69 (27 Nov 2022 12:57) (107/61 - 155/92)  BP(mean): --  RR: 18 (27 Nov 2022 12:57) (17 - 18)  SpO2: 97% (27 Nov 2022 12:57) (94% - 98%)    Parameters below as of 27 Nov 2022 12:57  Patient On (Oxygen Delivery Method): nasal cannula  O2 Flow (L/min): 3      Physical Exam:  General: non toxic  Cardio: regular rate   Respiratory: nonlabored on nasal cannula   abd: nondistended  Musculoskeletal: left arm dressed   vascular: no phlebitis   Skin: no rash                          7.6    11.48 )-----------( 277      ( 27 Nov 2022 07:09 )             24.2       11-27    129<L>  |  90<L>  |  57<H>  ----------------------------<  202<H>  4.7   |  22  |  8.46<H>    Ca    9.4      27 Nov 2022 07:09  Phos  6.4     11-27  Mg     2.2     11-27            MICROBIOLOGY:  Culture - Blood (collected 11-24-22 @ 11:57)  Source: .Blood Blood-Peripheral  Gram Stain (11-25-22 @ 15:47):    Growth in aerobic bottle: Gram Positive Cocci in Clusters  Final Report (11-27-22 @ 08:01):    Growth in aerobic bottle: Staphylococcus aureus  Organism: Staphylococcus aureus (11-27-22 @ 08:01)  Organism: Staphylococcus aureus (11-27-22 @ 08:01)      -  Ampicillin/Sulbactam: S <=8/4      -  Cefazolin: S <=4      -  Clindamycin: S <=0.25      -  Erythromycin: I 4      -  Gentamicin: S <=1 Should not be used as monotherapy      -  Oxacillin: S <=0.25 Oxacillin predicts susceptibility for dicloxacillin, methicillin, and nafcillin      -  Penicillin: R 8      -  Rifampin: S <=1 Should not be used as monotherapy      -  Tetracycline: S <=1      -  Trimethoprim/Sulfamethoxazole: S <=0.5/9.5      -  Vancomycin: S 2      Method Type: LISA    Culture - Blood (collected 11-24-22 @ 10:55)  Source: .Blood Blood-Peripheral  Preliminary Report (11-25-22 @ 14:01):    No growth to date.    Culture - Surgical Swab (collected 11-23-22 @ 22:02)  Source: .Surgical Swab LEFT ARM INFECTED FISTULA  Preliminary Report (11-24-22 @ 18:27):    Few Staphylococcus aureus  Organism: Staphylococcus aureus (11-25-22 @ 19:07)  Organism: Staphylococcus aureus (11-25-22 @ 19:07)      -  Ampicillin/Sulbactam: S <=8/4      -  Cefazolin: S <=4      -  Clindamycin: S <=0.25      -  Erythromycin: S <=0.25      -  Gentamicin: S <=1 Should not be used as monotherapy      -  Oxacillin: S <=0.25 Oxacillin predicts susceptibility for dicloxacillin, methicillin, and nafcillin      -  Penicillin: R 4      -  Rifampin: S <=1 Should not be used as monotherapy      -  Tetracycline: S <=1      -  Trimethoprim/Sulfamethoxazole: S <=0.5/9.5      -  Vancomycin: S 2      Method Type: LISA    Culture - Blood (collected 11-23-22 @ 09:35)  Source: .Blood Blood-Peripheral  Preliminary Report (11-24-22 @ 13:01):    No growth to date.    Culture - Blood (collected 11-23-22 @ 09:35)  Source: .Blood Blood-Peripheral  Preliminary Report (11-24-22 @ 13:01):    No growth to date.    Culture - Blood (collected 11-21-22 @ 03:27)  Source: .Blood Blood-Peripheral  Gram Stain (11-22-22 @ 04:57):    Growth in aerobic bottle: Gram Positive Cocci in Clusters    Growth in anaerobic bottle: Gram Positive Cocci in Clusters  Final Report (11-23-22 @ 14:30):    Growth in aerobic and anaerobic bottles: Staphylococcus aureus    See previous culture [id number]    Culture - Blood (collected 11-21-22 @ 03:27)  Source: .Blood Blood-Peripheral  Gram Stain (11-23-22 @ 08:35):    Growth in anaerobic bottle: Gram Positive Cocci in Clusters    Growth in aerobic bottle: Gram Positive Cocci in Clusters  Final Report (11-24-22 @ 10:47):    Growth in aerobic and anaerobic bottles: Staphylococcus aureus    ***Blood Panel PCR results on this specimen are available    approximately 3 hours after the Gram stain result.***    Gram stain, PCR, and/or culture results may not always    correspond dueto difference in methodologies.    ************************************************************    This PCR assay was performed by multiplex PCR. This    Assay tests for 66 bacterial and resistance gene targets.    Please refer to the [facility] test directory    at [email or web address] for details.  Organism: Blood Culture PCR  Staphylococcus aureus (11-24-22 @ 10:47)  Organism: Staphylococcus aureus (11-24-22 @ 10:47)      -  Ampicillin/Sulbactam: S <=8/4      -  Cefazolin: S <=4      -  Clindamycin: S <=0.25      -  Erythromycin: S <=0.25      -  Gentamicin: S <=1 Should not be used as monotherapy      -  Oxacillin: S <=0.25 Oxacillin predicts susceptibility for dicloxacillin, methicillin, and nafcillin      -  Penicillin: R 8      -  Rifampin: S <=1 Should not be used as monotherapy      -  Tetracycline: S <=1      -  Trimethoprim/Sulfamethoxazole: S <=0.5/9.5      -  Vancomycin: S 2      Method Type: LISA  Organism: Blood Culture PCR (11-24-22 @ 10:47)      -  Methicillin SENSITIVE Staphylococcus aureus (MSSA): Detec Any isolate of Staphylococcus aureus from a blood culture is NOT considered a contaminant.      Method Type: PCR    RADIOLOGY:  Images below reviewed personally  Xray Chest 1 View- PORTABLE-Urgent (Xray Chest 1 View- PORTABLE-Urgent .) (11.25.22 @ 04:09)   Bilateral patchy interstitial opacities slightly worsened compared to   prior radiograph, representative of pulmonary edema.

## 2022-11-27 NOTE — PROGRESS NOTE ADULT - ASSESSMENT
42M BMI 48, COPD on home oxygen, hypertensive nephropathy on dialysis, bipolar disorder, schizophrenia.   Here 11/19 with sepsis, high grade MSSA bacteremia.   From left arm AV graft s/p excision 11/23.   TTE without vegetations but CT suggests pulmonary septic emboli.   Cultures 11/24 still with growth but less.   Stable.     Suggest  -Ancef 1GM IV q24h post dialysis   -f/u repeat blood cultures   -ALBINO next week   -anticipate eventual 6-week course of antibiotics from negative cultures     Discussed with surgery     Kveen Eduardo MD   Infectious Disease   Available on TEAMS. After 5PM and on weekends please page fellow on call or call [phone]

## 2022-11-27 NOTE — PROGRESS NOTE ADULT - SUBJECTIVE AND OBJECTIVE BOX
TEAM Surgery Progress Note  Patient is a 42y old  Male who presents with a chief complaint of fevers and chills (26 Nov 2022 14:08)        SUBJECTIVE: Patient seen and examined at bedside with surgical team, patient without complaints. Complained of back pain overnight, which resolved with a lidocaine patch. Denies fever, chills, CP, SOB nausea, vomiting, abdominal pain.        OBJECTIVE:    Vital Signs Last 24 Hrs  T(C): 36.9 (27 Nov 2022 06:56), Max: 37.5 (26 Nov 2022 09:57)  T(F): 98.4 (27 Nov 2022 06:56), Max: 99.5 (26 Nov 2022 09:57)  HR: 92 (27 Nov 2022 06:56) (80 - 106)  BP: 149/85 (27 Nov 2022 06:56) (107/61 - 155/92)  BP(mean): --  RR: 17 (27 Nov 2022 06:56) (17 - 20)  SpO2: 96% (27 Nov 2022 06:56) (91% - 98%)    Parameters below as of 27 Nov 2022 06:56  Patient On (Oxygen Delivery Method): nasal cannula  O2 Flow (L/min): 3  I&O's Detail    26 Nov 2022 07:01  -  27 Nov 2022 07:00  --------------------------------------------------------  IN:    Oral Fluid: 500 mL  Total IN: 500 mL    OUT:    Other (mL): 4000 mL    Voided (mL): 0 mL  Total OUT: 4000 mL    Total NET: -3500 mL      MEDICATIONS  (STANDING):  ceFAZolin   IVPB 1000 milliGRAM(s) IV Intermittent every 24 hours  chlorhexidine 4% Liquid 1 Application(s) Topical <User Schedule>  epoetin cyndie-epbx (RETACRIT) Injectable 84264 Unit(s) IV Push <User Schedule>  heparin   Injectable 5000 Unit(s) SubCutaneous every 8 hours  lidocaine   4% Patch 1 Patch Transdermal once  mirtazapine 15 milliGRAM(s) Oral at bedtime  PARoxetine 20 milliGRAM(s) Oral daily  QUEtiapine 300 milliGRAM(s) Oral at bedtime  risperiDONE   Tablet 2 milliGRAM(s) Oral daily  sevelamer carbonate 800 milliGRAM(s) Oral three times a day with meals  simvastatin 40 milliGRAM(s) Oral at bedtime  sodium chloride 0.9%. 1000 milliLiter(s) (20 mL/Hr) IV Continuous <Continuous>    MEDICATIONS  (PRN):  HYDROmorphone   Tablet 2 milliGRAM(s) Oral every 4 hours PRN Moderate Pain (4 - 6)  HYDROmorphone   Tablet 4 milliGRAM(s) Oral every 4 hours PRN Severe Pain (7 - 10)  nalbuphine Injectable 2.5 milliGRAM(s) IV Push every 6 hours PRN Pruritus  naloxone Injectable 0.1 milliGRAM(s) IV Push every 3 minutes PRN For ANY of the following changes in patient status:  A. RR LESS THAN 10 breaths per minute, B. Oxygen saturation LESS THAN 90%, C. Sedation score of 6  ondansetron Injectable 4 milliGRAM(s) IV Push every 6 hours PRN Nausea  ondansetron Injectable 4 milliGRAM(s) IV Push every 6 hours PRN Nausea and/or Vomiting  sodium chloride 0.9% lock flush 10 milliLiter(s) IV Push every 1 hour PRN Pre/post blood products, medications, blood draw, and to maintain line patency      PHYSICAL EXAM:  General: resting comfortably in bed  HEENT: Normocephalic atraumatic  Respiratory: Nonlabored respirations  Cardio: pulse present  Extremities: RUE with wound vac to suction.       LABS:                        7.6    11.48 )-----------( 277      ( 27 Nov 2022 07:09 )             24.2     11-26    131<L>  |  90<L>  |  74<H>  ----------------------------<  167<H>  4.4   |  23  |  9.64<H>    Ca    8.9      26 Nov 2022 14:18  Phos  5.9     11-26  Mg     2.3     11-26                IMAGING:

## 2022-11-28 DIAGNOSIS — K74.60 UNSPECIFIED CIRRHOSIS OF LIVER: ICD-10-CM

## 2022-11-28 DIAGNOSIS — T82.7XXA INFECTION AND INFLAMMATORY REACTION DUE TO OTHER CARDIAC AND VASCULAR DEVICES, IMPLANTS AND GRAFTS, INITIAL ENCOUNTER: ICD-10-CM

## 2022-11-28 DIAGNOSIS — R05.9 COUGH, UNSPECIFIED: ICD-10-CM

## 2022-11-28 LAB
ANA TITR SER: NEGATIVE — SIGNIFICANT CHANGE UP
ANION GAP SERPL CALC-SCNC: 20 MMOL/L — HIGH (ref 5–17)
BUN SERPL-MCNC: 75 MG/DL — HIGH (ref 7–23)
CALCIUM SERPL-MCNC: 9.5 MG/DL — SIGNIFICANT CHANGE UP (ref 8.4–10.5)
CHLORIDE SERPL-SCNC: 88 MMOL/L — LOW (ref 96–108)
CO2 SERPL-SCNC: 20 MMOL/L — LOW (ref 22–31)
CREAT SERPL-MCNC: 10.51 MG/DL — HIGH (ref 0.5–1.3)
CULTURE RESULTS: SIGNIFICANT CHANGE UP
CULTURE RESULTS: SIGNIFICANT CHANGE UP
D DIMER BLD IA.RAPID-MCNC: 3153 NG/ML DDU — HIGH
EGFR: 6 ML/MIN/1.73M2 — LOW
GLUCOSE SERPL-MCNC: 132 MG/DL — HIGH (ref 70–99)
GRAM STN FLD: SIGNIFICANT CHANGE UP
HCT VFR BLD CALC: 24.1 % — LOW (ref 39–50)
HGB BLD-MCNC: 7.7 G/DL — LOW (ref 13–17)
MAGNESIUM SERPL-MCNC: 2.5 MG/DL — SIGNIFICANT CHANGE UP (ref 1.6–2.6)
MCHC RBC-ENTMCNC: 32 GM/DL — SIGNIFICANT CHANGE UP (ref 32–36)
MCHC RBC-ENTMCNC: 32 PG — SIGNIFICANT CHANGE UP (ref 27–34)
MCV RBC AUTO: 100 FL — SIGNIFICANT CHANGE UP (ref 80–100)
NRBC # BLD: 0 /100 WBCS — SIGNIFICANT CHANGE UP (ref 0–0)
ORGANISM # SPEC MICROSCOPIC CNT: SIGNIFICANT CHANGE UP
ORGANISM # SPEC MICROSCOPIC CNT: SIGNIFICANT CHANGE UP
PHOSPHATE SERPL-MCNC: 8.3 MG/DL — HIGH (ref 2.5–4.5)
PLATELET # BLD AUTO: 305 K/UL — SIGNIFICANT CHANGE UP (ref 150–400)
POTASSIUM SERPL-MCNC: 5.1 MMOL/L — SIGNIFICANT CHANGE UP (ref 3.5–5.3)
POTASSIUM SERPL-SCNC: 5.1 MMOL/L — SIGNIFICANT CHANGE UP (ref 3.5–5.3)
RAPID RVP RESULT: SIGNIFICANT CHANGE UP
RBC # BLD: 2.41 M/UL — LOW (ref 4.2–5.8)
RBC # FLD: 15.7 % — HIGH (ref 10.3–14.5)
SARS-COV-2 RNA SPEC QL NAA+PROBE: SIGNIFICANT CHANGE UP
SODIUM SERPL-SCNC: 128 MMOL/L — LOW (ref 135–145)
SPECIMEN SOURCE: SIGNIFICANT CHANGE UP
SPECIMEN SOURCE: SIGNIFICANT CHANGE UP
WBC # BLD: 13.3 K/UL — HIGH (ref 3.8–10.5)
WBC # FLD AUTO: 13.3 K/UL — HIGH (ref 3.8–10.5)

## 2022-11-28 PROCEDURE — 99233 SBSQ HOSP IP/OBS HIGH 50: CPT

## 2022-11-28 PROCEDURE — 93970 EXTREMITY STUDY: CPT | Mod: 26

## 2022-11-28 PROCEDURE — 93010 ELECTROCARDIOGRAM REPORT: CPT

## 2022-11-28 RX ORDER — HYDROMORPHONE HYDROCHLORIDE 2 MG/ML
0.75 INJECTION INTRAMUSCULAR; INTRAVENOUS; SUBCUTANEOUS EVERY 8 HOURS
Refills: 0 | Status: DISCONTINUED | OUTPATIENT
Start: 2022-11-28 | End: 2022-11-28

## 2022-11-28 RX ORDER — ALBUTEROL 90 UG/1
2.5 AEROSOL, METERED ORAL ONCE
Refills: 0 | Status: COMPLETED | OUTPATIENT
Start: 2022-11-28 | End: 2022-11-28

## 2022-11-28 RX ORDER — HYDROMORPHONE HYDROCHLORIDE 2 MG/ML
4 INJECTION INTRAMUSCULAR; INTRAVENOUS; SUBCUTANEOUS EVERY 4 HOURS
Refills: 0 | Status: DISCONTINUED | OUTPATIENT
Start: 2022-11-28 | End: 2022-11-30

## 2022-11-28 RX ORDER — HYDROMORPHONE HYDROCHLORIDE 2 MG/ML
2 INJECTION INTRAMUSCULAR; INTRAVENOUS; SUBCUTANEOUS EVERY 4 HOURS
Refills: 0 | Status: DISCONTINUED | OUTPATIENT
Start: 2022-11-28 | End: 2022-11-30

## 2022-11-28 RX ORDER — HYDROMORPHONE HYDROCHLORIDE 2 MG/ML
0.5 INJECTION INTRAMUSCULAR; INTRAVENOUS; SUBCUTANEOUS EVERY 8 HOURS
Refills: 0 | Status: DISCONTINUED | OUTPATIENT
Start: 2022-11-28 | End: 2022-11-28

## 2022-11-28 RX ADMIN — Medication 20 MILLIGRAM(S): at 12:46

## 2022-11-28 RX ADMIN — SEVELAMER CARBONATE 800 MILLIGRAM(S): 2400 POWDER, FOR SUSPENSION ORAL at 17:54

## 2022-11-28 RX ADMIN — HYDROMORPHONE HYDROCHLORIDE 4 MILLIGRAM(S): 2 INJECTION INTRAMUSCULAR; INTRAVENOUS; SUBCUTANEOUS at 08:54

## 2022-11-28 RX ADMIN — HYDROMORPHONE HYDROCHLORIDE 4 MILLIGRAM(S): 2 INJECTION INTRAMUSCULAR; INTRAVENOUS; SUBCUTANEOUS at 03:14

## 2022-11-28 RX ADMIN — HYDROMORPHONE HYDROCHLORIDE 4 MILLIGRAM(S): 2 INJECTION INTRAMUSCULAR; INTRAVENOUS; SUBCUTANEOUS at 18:24

## 2022-11-28 RX ADMIN — Medication 100 MILLIGRAM(S): at 23:27

## 2022-11-28 RX ADMIN — ERYTHROPOIETIN 20000 UNIT(S): 10000 INJECTION, SOLUTION INTRAVENOUS; SUBCUTANEOUS at 19:18

## 2022-11-28 RX ADMIN — HYDROMORPHONE HYDROCHLORIDE 4 MILLIGRAM(S): 2 INJECTION INTRAMUSCULAR; INTRAVENOUS; SUBCUTANEOUS at 09:24

## 2022-11-28 RX ADMIN — Medication 100 MILLIGRAM(S): at 23:26

## 2022-11-28 RX ADMIN — HEPARIN SODIUM 5000 UNIT(S): 5000 INJECTION INTRAVENOUS; SUBCUTANEOUS at 23:27

## 2022-11-28 RX ADMIN — QUETIAPINE FUMARATE 300 MILLIGRAM(S): 200 TABLET, FILM COATED ORAL at 23:26

## 2022-11-28 RX ADMIN — SEVELAMER CARBONATE 800 MILLIGRAM(S): 2400 POWDER, FOR SUSPENSION ORAL at 08:54

## 2022-11-28 RX ADMIN — HEPARIN SODIUM 5000 UNIT(S): 5000 INJECTION INTRAVENOUS; SUBCUTANEOUS at 14:38

## 2022-11-28 RX ADMIN — Medication 600 MILLIGRAM(S): at 01:02

## 2022-11-28 RX ADMIN — HYDROMORPHONE HYDROCHLORIDE 4 MILLIGRAM(S): 2 INJECTION INTRAMUSCULAR; INTRAVENOUS; SUBCUTANEOUS at 02:14

## 2022-11-28 RX ADMIN — SIMVASTATIN 40 MILLIGRAM(S): 20 TABLET, FILM COATED ORAL at 23:26

## 2022-11-28 RX ADMIN — CHLORHEXIDINE GLUCONATE 1 APPLICATION(S): 213 SOLUTION TOPICAL at 08:54

## 2022-11-28 RX ADMIN — MIRTAZAPINE 15 MILLIGRAM(S): 45 TABLET, ORALLY DISINTEGRATING ORAL at 23:26

## 2022-11-28 RX ADMIN — SEVELAMER CARBONATE 800 MILLIGRAM(S): 2400 POWDER, FOR SUSPENSION ORAL at 12:46

## 2022-11-28 RX ADMIN — HYDROMORPHONE HYDROCHLORIDE 4 MILLIGRAM(S): 2 INJECTION INTRAMUSCULAR; INTRAVENOUS; SUBCUTANEOUS at 17:54

## 2022-11-28 RX ADMIN — HEPARIN SODIUM 5000 UNIT(S): 5000 INJECTION INTRAVENOUS; SUBCUTANEOUS at 06:46

## 2022-11-28 RX ADMIN — ALBUTEROL 2.5 MILLIGRAM(S): 90 AEROSOL, METERED ORAL at 01:02

## 2022-11-28 RX ADMIN — RISPERIDONE 2 MILLIGRAM(S): 4 TABLET ORAL at 12:46

## 2022-11-28 NOTE — PROGRESS NOTE ADULT - SUBJECTIVE AND OBJECTIVE BOX
Follow Up: MSSA    Interval History/ROS: Afebrile. Has right sided chest pain, worse with deep breaths. No chills. Arm is killing him, asks for IV dilaudid. WBC still rising. No diarrhea. Mother and neighbor at bedside.     Allergies  aspirin (Swelling)  fish (Unknown)  penicillins (Swelling)  shellfish (Rash)        ANTIMICROBIALS:  ceFAZolin   IVPB 1000 every 24 hours      OTHER MEDS:  acetaminophen   IVPB .. 1000 milliGRAM(s) IV Intermittent every 6 hours PRN  benzonatate 100 milliGRAM(s) Oral every 8 hours  chlorhexidine 4% Liquid 1 Application(s) Topical <User Schedule>  epoetin cyndie-epbx (RETACRIT) Injectable 75160 Unit(s) IV Push <User Schedule>  guaiFENesin  milliGRAM(s) Oral every 12 hours PRN  heparin   Injectable 5000 Unit(s) SubCutaneous every 8 hours  HYDROmorphone   Tablet 2 milliGRAM(s) Oral every 4 hours PRN  HYDROmorphone   Tablet 4 milliGRAM(s) Oral every 4 hours PRN  lidocaine   4% Patch 1 Patch Transdermal once  mirtazapine 15 milliGRAM(s) Oral at bedtime  nalbuphine Injectable 2.5 milliGRAM(s) IV Push every 6 hours PRN  naloxone Injectable 0.1 milliGRAM(s) IV Push every 3 minutes PRN  ondansetron Injectable 4 milliGRAM(s) IV Push every 6 hours PRN  ondansetron Injectable 4 milliGRAM(s) IV Push every 6 hours PRN  PARoxetine 20 milliGRAM(s) Oral daily  QUEtiapine 300 milliGRAM(s) Oral at bedtime  risperiDONE   Tablet 2 milliGRAM(s) Oral daily  sevelamer carbonate 800 milliGRAM(s) Oral three times a day with meals  simvastatin 40 milliGRAM(s) Oral at bedtime  sodium chloride 0.9% lock flush 10 milliLiter(s) IV Push every 1 hour PRN      Vital Signs Last 24 Hrs  T(C): 36.8 (28 Nov 2022 12:52), Max: 37.3 (28 Nov 2022 06:34)  T(F): 98.3 (28 Nov 2022 12:52), Max: 99.1 (28 Nov 2022 06:34)  HR: 90 (28 Nov 2022 12:52) (67 - 100)  BP: 150/88 (28 Nov 2022 12:52) (112/74 - 168/92)  BP(mean): --  RR: 18 (28 Nov 2022 12:52) (18 - 18)  SpO2: 96% (28 Nov 2022 12:52) (93% - 99%)    Parameters below as of 28 Nov 2022 12:52  Patient On (Oxygen Delivery Method): nasal cannula  O2 Flow (L/min): 3      Physical Exam:  General: alert, non toxic, obese  Cardio: regular rate   Respiratory: nonlabored on nasal cannula   abd: nondistended, soft, nontender  Musculoskeletal: left arm dressed, woundvac. no focal joint swelling, no edema  vascular: right IJ CVC   Skin: no rash                          7.7    13.30 )-----------( 305      ( 28 Nov 2022 08:43 )             24.1       11-28    128<L>  |  88<L>  |  75<H>  ----------------------------<  132<H>  5.1   |  20<L>  |  10.51<H>    Ca    9.5      28 Nov 2022 08:44  Phos  8.3     11-28  Mg     2.5     11-28            MICROBIOLOGY:  Culture - Blood (collected 11-27-22 @ 07:15)  Source: .Blood Blood-Venous  Preliminary Report (11-28-22 @ 10:02):    No growth to date.    Culture - Blood (collected 11-27-22 @ 07:15)  Source: .Blood Blood  Preliminary Report (11-28-22 @ 10:02):    No growth to date.    Culture - Blood (collected 11-24-22 @ 11:57)  Source: .Blood Blood-Peripheral  Gram Stain (11-25-22 @ 15:47):    Growth in aerobic bottle: Gram Positive Cocci in Clusters  Final Report (11-27-22 @ 08:01):    Growth in aerobic bottle: Staphylococcus aureus  Organism: Staphylococcus aureus (11-27-22 @ 08:01)  Organism: Staphylococcus aureus (11-27-22 @ 08:01)      -  Ampicillin/Sulbactam: S <=8/4      -  Cefazolin: S <=4      -  Clindamycin: S <=0.25      -  Erythromycin: I 4      -  Gentamicin: S <=1 Should not be used as monotherapy      -  Oxacillin: S <=0.25 Oxacillin predicts susceptibility for dicloxacillin, methicillin, and nafcillin      -  Penicillin: R 8      -  Rifampin: S <=1 Should not be used as monotherapy      -  Tetracycline: S <=1      -  Trimethoprim/Sulfamethoxazole: S <=0.5/9.5      -  Vancomycin: S 2      Method Type: LISA    Culture - Blood (collected 11-24-22 @ 10:55)  Source: .Blood Blood-Peripheral  Preliminary Report (11-25-22 @ 14:01):    No growth to date.    Culture - Surgical Swab (collected 11-23-22 @ 22:02)  Source: .Surgical Swab LEFT ARM INFECTED FISTULA  Preliminary Report (11-24-22 @ 18:27):    Few Staphylococcus aureus  Organism: Staphylococcus aureus (11-25-22 @ 19:07)  Organism: Staphylococcus aureus (11-25-22 @ 19:07)      -  Ampicillin/Sulbactam: S <=8/4      -  Cefazolin: S <=4      -  Clindamycin: S <=0.25      -  Erythromycin: S <=0.25      -  Gentamicin: S <=1 Should not be used as monotherapy      -  Oxacillin: S <=0.25 Oxacillin predicts susceptibility for dicloxacillin, methicillin, and nafcillin      -  Penicillin: R 4      -  Rifampin: S <=1 Should not be used as monotherapy      -  Tetracycline: S <=1      -  Trimethoprim/Sulfamethoxazole: S <=0.5/9.5      -  Vancomycin: S 2      Method Type: LISA    Culture - Blood (collected 11-23-22 @ 09:35)  Source: .Blood Blood-Peripheral  Gram Stain (11-27-22 @ 20:57):    Growth in anaerobic bottle: Gram Positive Cocci in Clusters  Final Report (11-28-22 @ 14:23):    Growth in anaerobic bottle: Staphylococcus aureus    See previous culture [id number]    Culture - Blood (collected 11-23-22 @ 09:35)  Source: .Blood Blood-Peripheral  Gram Stain (11-28-22 @ 08:22):    Growth in aerobic bottle: Gram Positive Cocci in Clusters    Growth in anaerobic bottle: Gram Positive Cocci in Clusters  Preliminary Report (11-28-22 @ 08:23):    Growth in aerobic bottle: Gram Positive Cocci in Clusters    Growth in anaerobic bottle: Gram Positive Cocci in Clusters    RADIOLOGY:  Images below reviewed personally  Xray Chest 1 View- PORTABLE-Urgent (Xray Chest 1 View- PORTABLE-Urgent .) (11.27.22 @ 23:41)   Interval improvement of pulmonary edema.

## 2022-11-28 NOTE — PROGRESS NOTE ADULT - ASSESSMENT
42M BMI 48, COPD on home oxygen, hypertensive nephropathy on dialysis, bipolar disorder, schizophrenia.   Here 11/19 with sepsis, high grade MSSA bacteremia.   From left arm AV graft s/p excision 11/23.   Clearing 11/27 and TTE without vegetations but CT suggests pulmonary septic emboli.   Stable but WBC rising for some reason. Reactive post-op? No diarrhea to suggest C diff. Surgical site clean. Pleuritic chest pain - musculoskeletal?     Suggest  -Ancef 1GM IV q24h post dialysis - eventually can discharge on three times weekly 2GM, 2GM, 3GM   -monitor blood cultures and repeat two sets tomorrow   -check ALBINO   -check D-Dimer and dopplers   -will wait for blood cultures to finalize before permacath   -eventual 6-week course of antibiotics from negative cultures     Discussed with surgery and medicine     Keven Eduardo MD   Infectious Disease   Available on TEAMS. After 5PM and on weekends please page fellow on call or call [phone]

## 2022-11-28 NOTE — PROGRESS NOTE ADULT - PROBLEM SELECTOR PLAN 4
Unclear medications- Metoprolol 50 BID and Procardia 30 (please verify with o/p pharmacy) - patient unsure    - med rec with pt's mom- does not use either medications -per pt productive cough started on 11/23  -Will send RVP  -c/w PRN guaifenesin, tessalon perles   -likely etiology of patient's costochondritis on his R rib  -CXR showed no consolidations  -incentive spirometry -per pt productive cough started on 11/23  -Will send RVP  -c/w PRN guaifenesin, tessalon perles   -likely etiology of patient's costochondritis on his R rib  -CXR showed no consolidations  -incentive spirometry  - r/o PE, dimer and dopplers

## 2022-11-28 NOTE — PROGRESS NOTE ADULT - PROBLEM SELECTOR PLAN 7
On ventolin, symbicort, duoneb. On 4L NC at home    - c/w all home meds Hx of schizophrenia. Home medication risperidone 2mg daily, seroquel 300qhs

## 2022-11-28 NOTE — PROGRESS NOTE ADULT - PROBLEM SELECTOR PLAN 3
Patient complete dialysis at home M/Tue/off Wed/Thur/ off Fri/ Sat/ Sun. AVF in place (prior hx of aneurysm per [facility] records); Last dialysis thurdsay 11/ 17. Unable to complete HD yesterday at [place] d/t increased bleeding and noted purulent fluid  O/p [facility] nephrology Dr. La (private)    - dialysis 11/21, 11/22 & 11/23 s/p [facility] placement Patient complete dialysis at home M/Tue/off Wed/Thur/ off Fri/ Sat/ Sun. AVF in place (prior hx of aneurysm per [facility] records)  - currently getting dialysis through [facility]  - Will need permacath placement when cleared by ID  -HD per nephro
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
## 2022-11-28 NOTE — PROGRESS NOTE ADULT - SUBJECTIVE AND OBJECTIVE BOX
TEAM Surgery Progress Note  Patient is a 42y old  Male who presents with a chief complaint of fevers and chills (27 Nov 2022 16:36)    SUBJECTIVE: Patient seen and examined at bedside with surgical team, patient complained of pain with inspiration overnight, resolved. EKG and CXR normal. Denies fever, chills, CP, SOB nausea, vomiting, abdominal pain.      OBJECTIVE:    Vital Signs Last 24 Hrs  T(C): 37.3 (28 Nov 2022 06:34), Max: 37.3 (28 Nov 2022 06:34)  T(F): 99.1 (28 Nov 2022 06:34), Max: 99.1 (28 Nov 2022 06:34)  HR: 97 (28 Nov 2022 06:34) (77 - 97)  BP: 123/71 (28 Nov 2022 06:34) (112/69 - 168/92)  BP(mean): --  RR: 18 (28 Nov 2022 06:34) (18 - 18)  SpO2: 96% (28 Nov 2022 06:34) (96% - 99%)    Parameters below as of 28 Nov 2022 06:34  Patient On (Oxygen Delivery Method): nasal cannula  O2 Flow (L/min): 3  I&O's Detail    27 Nov 2022 07:01  -  28 Nov 2022 07:00  --------------------------------------------------------  IN:    Oral Fluid: 840 mL  Total IN: 840 mL    OUT:    Voided (mL): 0 mL  Total OUT: 0 mL    Total NET: 840 mL      MEDICATIONS  (STANDING):  ceFAZolin   IVPB 1000 milliGRAM(s) IV Intermittent every 24 hours  chlorhexidine 4% Liquid 1 Application(s) Topical <User Schedule>  epoetin cyndie-epbx (RETACRIT) Injectable 70505 Unit(s) IV Push <User Schedule>  heparin   Injectable 5000 Unit(s) SubCutaneous every 8 hours  lidocaine   4% Patch 1 Patch Transdermal once  mirtazapine 15 milliGRAM(s) Oral at bedtime  PARoxetine 20 milliGRAM(s) Oral daily  QUEtiapine 300 milliGRAM(s) Oral at bedtime  risperiDONE   Tablet 2 milliGRAM(s) Oral daily  sevelamer carbonate 800 milliGRAM(s) Oral three times a day with meals  simvastatin 40 milliGRAM(s) Oral at bedtime  sodium chloride 0.9%. 1000 milliLiter(s) (20 mL/Hr) IV Continuous <Continuous>    MEDICATIONS  (PRN):  acetaminophen   IVPB .. 1000 milliGRAM(s) IV Intermittent every 6 hours PRN Moderate Pain (4 - 6)  guaiFENesin  milliGRAM(s) Oral every 12 hours PRN Cough  HYDROmorphone   Tablet 2 milliGRAM(s) Oral every 4 hours PRN Moderate Pain (4 - 6)  HYDROmorphone   Tablet 4 milliGRAM(s) Oral every 4 hours PRN Severe Pain (7 - 10)  nalbuphine Injectable 2.5 milliGRAM(s) IV Push every 6 hours PRN Pruritus  naloxone Injectable 0.1 milliGRAM(s) IV Push every 3 minutes PRN For ANY of the following changes in patient status:  A. RR LESS THAN 10 breaths per minute, B. Oxygen saturation LESS THAN 90%, C. Sedation score of 6  ondansetron Injectable 4 milliGRAM(s) IV Push every 6 hours PRN Nausea and/or Vomiting  ondansetron Injectable 4 milliGRAM(s) IV Push every 6 hours PRN Nausea  sodium chloride 0.9% lock flush 10 milliLiter(s) IV Push every 1 hour PRN Pre/post blood products, medications, blood draw, and to maintain line patency    PHYSICAL EXAM:  General: resting comfortably in bed  HEENT: Normocephalic atraumatic  Respiratory: Nonlabored respirations  Cardio: pulse present  Extremities: RUE with wound vac to suction. Edematous, wrapped with ace.       LABS:                        7.6    11.48 )-----------( 277      ( 27 Nov 2022 07:09 )             24.2     11-27    129<L>  |  90<L>  |  57<H>  ----------------------------<  202<H>  4.7   |  22  |  8.46<H>    Ca    9.4      27 Nov 2022 07:09  Phos  6.4     11-27  Mg     2.2     11-27                IMAGING:

## 2022-11-28 NOTE — PROGRESS NOTE ADULT - PROBLEM SELECTOR PLAN 1
Found to have MSSA bacteremia 2/2 possible AVF infection d/t noted purulence. Transferred to [facility] for further imaging. S/p empiric treatment with vanc (11/19) and cefepime (11/19). Continued on ancef. Leukocytosis improved. LUE negative for findings of DVT.     -blood culture - MSSA, cultures 11/24 still positive, 11/27 prelim negative. Will need 6 week course of abx from negative culture  - c/w Ancef 11/20-  - s/p R IJ shiley placed 11/21   - CT Chest suggesting septic emboli to lungs  - CT angio of LUE suggests graft infections- s/p LUE graft excision on 11/23 now on wound vac   - TTE unchanged from 2019, would pursue ALBINO given persistence of pt's bacteremia Found to have MSSA bacteremia 2/2 possible AVF infection d/t noted purulence. Transferred to [facility] for further imaging. S/p empiric treatment with vanc (11/19) and cefepime (11/19). Continued on ancef. Leukocytosis improved. LUE negative for findings of DVT.     -blood culture - MSSA, cultures 11/24 still positive, 11/27 prelim negative. Will need 6 week course of abx from negative culture, PICC line when closer to discharge   - c/w Ancef 11/20-  - s/p R IJ shiley placed 11/21   - CT Chest suggesting septic emboli to lungs  - CT angio of LUE suggests graft infections- s/p LUE graft excision on 11/23 now on wound vac   - TTE unchanged from 2019, would pursue ALBINO given persistence of pt's bacteremia Found to have MSSA bacteremia 2/2 possible AVF infection d/t noted purulence. Transferred to [facility] for further imaging. S/p empiric treatment with vanc (11/19) and cefepime (11/19). Continued on ancef. Leukocytosis improved. LUE negative for findings of DVT.     -blood culture - MSSA, cultures 11/24 still positive, 11/27 prelim negative. Will need 6 week course of abx from negative culture, PICC line when closer to discharge   - c/w Ancef 11/20-  - s/p R IJ shiley placed 11/21   - CT Chest suggesting septic emboli to lungs  - CT angio of LUE suggests graft infections- s/p LUE graft excision on 11/23 now on wound vac   - TTE unchanged from 2019, would pursue ALBINO given persistence of pt's bacteremia  - noted gradually uptrending WBC, reactive vs infection, continue to monitor for clinical signs of infection

## 2022-11-28 NOTE — PROGRESS NOTE ADULT - SUBJECTIVE AND OBJECTIVE BOX
Patient seen and examined  no complaints    aspirin (Swelling)  fish (Unknown)  penicillins (Swelling)  shellfish (Rash)    Hospital Medications:   MEDICATIONS  (STANDING):  ceFAZolin   IVPB 1000 milliGRAM(s) IV Intermittent every 24 hours  chlorhexidine 4% Liquid 1 Application(s) Topical <User Schedule>  epoetin cyndie-epbx (RETACRIT) Injectable 41938 Unit(s) IV Push <User Schedule>  heparin   Injectable 5000 Unit(s) SubCutaneous every 8 hours  lidocaine   4% Patch 1 Patch Transdermal once  mirtazapine 15 milliGRAM(s) Oral at bedtime  PARoxetine 20 milliGRAM(s) Oral daily  QUEtiapine 300 milliGRAM(s) Oral at bedtime  risperiDONE   Tablet 2 milliGRAM(s) Oral daily  sevelamer carbonate 800 milliGRAM(s) Oral three times a day with meals  simvastatin 40 milliGRAM(s) Oral at bedtime        VITALS:  T(F): 98.3 (11-28-22 @ 12:52), Max: 99.1 (11-28-22 @ 06:34)  HR: 90 (11-28-22 @ 12:52)  BP: 150/88 (11-28-22 @ 12:52)  RR: 18 (11-28-22 @ 12:52)  SpO2: 96% (11-28-22 @ 12:52)  Wt(kg): --    11-27 @ 07:01  -  11-28 @ 07:00  --------------------------------------------------------  IN: 1240 mL / OUT: 0 mL / NET: 1240 mL    11-28 @ 07:01  -  11-28 @ 13:44  --------------------------------------------------------  IN: 400 mL / OUT: 0 mL / NET: 400 mL        Physical Exam :-  Constitutional: NAD  Neck: Supple.  Respiratory: Bilateral equal breath sounds,  Cardiovascular: S1, S2 normal,  Gastrointestinal: Bowel Sounds present, soft, non tender.  Extremities: trace edema, LUE clean dressing  Neurological: Alert and Oriented x 3, no focal deficits  Psychiatric: Normal mood, normal affect    LABS:  11-28    128<L>  |  88<L>  |  75<H>  ----------------------------<  132<H>  5.1   |  20<L>  |  10.51<H>    Ca    9.5      28 Nov 2022 08:44  Phos  8.3     11-28  Mg     2.5     11-28      Creatinine Trend: 10.51 <--, 8.46 <--, 9.64 <--, 12.11 <--, 10.55 <--, 8.70 <--, 10.50 <--                        7.7    13.30 )-----------( 305      ( 28 Nov 2022 08:43 )             24.1     Urine Studies:      RADIOLOGY & ADDITIONAL STUDIES:

## 2022-11-28 NOTE — PROGRESS NOTE ADULT - PROBLEM SELECTOR PLAN 8
- Diet: NPO after midnight  - DVT: heparin subQ  - PT Hx of bipolar disorder. Home medication: Risperidone 2mg daily, seroquel 300qhs, remeron 15 qhs    - c/w risperidone, seroquel, remeron
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
## 2022-11-28 NOTE — PROGRESS NOTE ADULT - ASSESSMENT
42-year-old male with past medical history ESRD on HD (at home, M to F via AV graft), COPD on home oxygen 4L, bipolar disorder, schizophrenia presents with fever and chills x1 day. Patient states he had a fever of up to 104 at home and in [place] ED was Tmax 100.6    ESRD on HD  Infected access, MSSA bacteremia, Septic Pulm emboli  S/P AVG excision 11/23/22  plan for HD Today (has home HD 5 times a week)  On Cefazolin per Infectious disease specialist  Has right IJ Selam  Will need ID clearance for Permacath Placement  Renal diet  Daily BMP    HTN  UF on HD  resume home BP med regimen  overall bp better    Anemia of CKD  Epo during HD  20k tiw with hd  trend hgb    BMD of CKD  Hyperphosphatemia  renvela with meals  trend etta phos    Hyponatremia  likely volume related  UF on HD  trend na      Dr Mauricio  [phone]
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
## 2022-11-28 NOTE — PROGRESS NOTE ADULT - PROBLEM SELECTOR PLAN 6
Hx of bipolar disorder. Home medication: Risperidone 2mg daily, seroquel 300qhs, remeron 15 qhs    - c/w risperidone, seroquel, remeron -not on any home antihypertensives  -pressures here have been variable, if persistently hypertensive can start medications

## 2022-11-28 NOTE — PROGRESS NOTE ADULT - ASSESSMENT
42-year-old male with past medical history ESRD on HD (at home, M to F via AV graft), COPD on home oxygen 4L, bipolar disorder, schizophrenia presented to [facility] fever and chills x1 day. Transferred to [facility] for continued MSSA bacteremia management and further imaging, now s/p LUE AV graft excision 11/23.

## 2022-11-28 NOTE — PROGRESS NOTE ADULT - SUBJECTIVE AND OBJECTIVE BOX
Andreia Garcia MD  Division of Hospital Medicine  Pager: [phone] or reachable on MS Teams  After hours please page [phone]    PROGRESS NOTE:     Patient is a 42y old  Male who presents with a chief complaint of fevers and chills (28 Nov 2022 13:44) now s/p excision of RUE infected AVG 11/23 with wound vac     SUBJECTIVE / OVERNIGHT EVENTS: Patient complaining of some L sided chest pain overnight, states that he is still in pain mostly from the ribs more so than the surgical site. He expressed that his pain is not well controlled. Also complains that he has a cough that developed in the hospital.     ADDITIONAL REVIEW OF SYSTEMS: Denies cardiac chest pain, some pleuritic pain on deep inhalation, scattered abdominal pain.     MEDICATIONS  (STANDING):  ceFAZolin   IVPB 1000 milliGRAM(s) IV Intermittent every 24 hours  chlorhexidine 4% Liquid 1 Application(s) Topical <User Schedule>  epoetin cyndie-epbx (RETACRIT) Injectable 34478 Unit(s) IV Push <User Schedule>  heparin   Injectable 5000 Unit(s) SubCutaneous every 8 hours  lidocaine   4% Patch 1 Patch Transdermal once  mirtazapine 15 milliGRAM(s) Oral at bedtime  PARoxetine 20 milliGRAM(s) Oral daily  QUEtiapine 300 milliGRAM(s) Oral at bedtime  risperiDONE   Tablet 2 milliGRAM(s) Oral daily  sevelamer carbonate 800 milliGRAM(s) Oral three times a day with meals  simvastatin 40 milliGRAM(s) Oral at bedtime    MEDICATIONS  (PRN):  acetaminophen   IVPB .. 1000 milliGRAM(s) IV Intermittent every 6 hours PRN Moderate Pain (4 - 6)  guaiFENesin  milliGRAM(s) Oral every 12 hours PRN Cough  HYDROmorphone   Tablet 2 milliGRAM(s) Oral every 4 hours PRN Moderate Pain (4 - 6)  HYDROmorphone   Tablet 4 milliGRAM(s) Oral every 4 hours PRN Severe Pain (7 - 10)  nalbuphine Injectable 2.5 milliGRAM(s) IV Push every 6 hours PRN Pruritus  naloxone Injectable 0.1 milliGRAM(s) IV Push every 3 minutes PRN For ANY of the following changes in patient status:  A. RR LESS THAN 10 breaths per minute, B. Oxygen saturation LESS THAN 90%, C. Sedation score of 6  ondansetron Injectable 4 milliGRAM(s) IV Push every 6 hours PRN Nausea and/or Vomiting  ondansetron Injectable 4 milliGRAM(s) IV Push every 6 hours PRN Nausea  sodium chloride 0.9% lock flush 10 milliLiter(s) IV Push every 1 hour PRN Pre/post blood products, medications, blood draw, and to maintain line patency      CAPILLARY BLOOD GLUCOSE        I&O's Summary    27 Nov 2022 07:01  -  28 Nov 2022 07:00  --------------------------------------------------------  IN: 1240 mL / OUT: 0 mL / NET: 1240 mL    28 Nov 2022 07:01  -  28 Nov 2022 15:27  --------------------------------------------------------  IN: 400 mL / OUT: 0 mL / NET: 400 mL        PHYSICAL EXAM:  Vital Signs Last 24 Hrs  T(C): 36.8 (28 Nov 2022 12:52), Max: 37.3 (28 Nov 2022 06:34)  T(F): 98.3 (28 Nov 2022 12:52), Max: 99.1 (28 Nov 2022 06:34)  HR: 90 (28 Nov 2022 12:52) (67 - 100)  BP: 150/88 (28 Nov 2022 12:52) (112/74 - 168/92)  BP(mean): --  RR: 18 (28 Nov 2022 12:52) (18 - 18)  SpO2: 96% (28 Nov 2022 12:52) (93% - 99%)    Parameters below as of 28 Nov 2022 12:52  Patient On (Oxygen Delivery Method): nasal cannula  O2 Flow (L/min): 3      CONSTITUTIONAL: NAD, well-developed  RESPIRATORY: Normal respiratory effort; dyspnea on deep inhalation, lungs are clear to auscultation bilaterally  CARDIOVASCULAR: Regular rate and rhythm, normal S1 and S2, no murmur/rub/gallop; No lower extremity edema; Peripheral pulses are 2+ bilaterally  ABDOMEN: Mild tenderness to palpation, nonspecific, normoactive bowel sounds, no rebound/guarding; No hepatosplenomegaly  MUSCLOSKELETAL: no clubbing or cyanosis of digits; no joint swelling or tenderness to palpation  L arm wrapped, wound vac in place  PSYCH: A+O to person, place, and time; affect appropriate    LABS:                        7.7    13.30 )-----------( 305      ( 28 Nov 2022 08:43 )             24.1     11-28    128<L>  |  88<L>  |  75<H>  ----------------------------<  132<H>  5.1   |  20<L>  |  10.51<H>    Ca    9.5      28 Nov 2022 08:44  Phos  8.3     11-28  Mg     2.5     11-28                Culture - Blood (collected 27 Nov 2022 07:15)  Source: .Blood Blood-Venous  Preliminary Report (28 Nov 2022 10:02):    No growth to date.    Culture - Blood (collected 27 Nov 2022 07:15)  Source: .Blood Blood  Preliminary Report (28 Nov 2022 10:02):    No growth to date.        RADIOLOGY & ADDITIONAL TESTS:  Results Reviewed:   Imaging Personally Reviewed:  Electrocardiogram Personally Reviewed:    COORDINATION OF CARE:  Care Discussed with Consultants/Other Providers [Y/N]:  Prior or Outpatient Records Reviewed [Y/N]:   Andreia Garcia MD  Division of Hospital Medicine  Pager: [phone] or reachable on MS Teams  After hours please page [phone]    PROGRESS NOTE:     Patient is a 42y old  Male who presents with a chief complaint of fevers and chills (28 Nov 2022 13:44) now s/p excision of RUE infected AVG 11/23 with wound vac     SUBJECTIVE / OVERNIGHT EVENTS: Patient complaining of some L sided chest pain overnight, states that he is still in pain mostly from the ribs more so than the surgical site. He expressed that his pain is not well controlled since being off PCA pump. Also complains that he has a cough that developed in the hospital.     ADDITIONAL REVIEW OF SYSTEMS: Denies cardiac chest pain, some pleuritic pain on deep inhalation, scattered abdominal pain.     MEDICATIONS  (STANDING):  ceFAZolin   IVPB 1000 milliGRAM(s) IV Intermittent every 24 hours  chlorhexidine 4% Liquid 1 Application(s) Topical <User Schedule>  epoetin cyndie-epbx (RETACRIT) Injectable 84987 Unit(s) IV Push <User Schedule>  heparin   Injectable 5000 Unit(s) SubCutaneous every 8 hours  lidocaine   4% Patch 1 Patch Transdermal once  mirtazapine 15 milliGRAM(s) Oral at bedtime  PARoxetine 20 milliGRAM(s) Oral daily  QUEtiapine 300 milliGRAM(s) Oral at bedtime  risperiDONE   Tablet 2 milliGRAM(s) Oral daily  sevelamer carbonate 800 milliGRAM(s) Oral three times a day with meals  simvastatin 40 milliGRAM(s) Oral at bedtime    MEDICATIONS  (PRN):  acetaminophen   IVPB .. 1000 milliGRAM(s) IV Intermittent every 6 hours PRN Moderate Pain (4 - 6)  guaiFENesin  milliGRAM(s) Oral every 12 hours PRN Cough  HYDROmorphone   Tablet 2 milliGRAM(s) Oral every 4 hours PRN Moderate Pain (4 - 6)  HYDROmorphone   Tablet 4 milliGRAM(s) Oral every 4 hours PRN Severe Pain (7 - 10)  nalbuphine Injectable 2.5 milliGRAM(s) IV Push every 6 hours PRN Pruritus  naloxone Injectable 0.1 milliGRAM(s) IV Push every 3 minutes PRN For ANY of the following changes in patient status:  A. RR LESS THAN 10 breaths per minute, B. Oxygen saturation LESS THAN 90%, C. Sedation score of 6  ondansetron Injectable 4 milliGRAM(s) IV Push every 6 hours PRN Nausea and/or Vomiting  ondansetron Injectable 4 milliGRAM(s) IV Push every 6 hours PRN Nausea  sodium chloride 0.9% lock flush 10 milliLiter(s) IV Push every 1 hour PRN Pre/post blood products, medications, blood draw, and to maintain line patency      CAPILLARY BLOOD GLUCOSE        I&O's Summary    27 Nov 2022 07:01  -  28 Nov 2022 07:00  --------------------------------------------------------  IN: 1240 mL / OUT: 0 mL / NET: 1240 mL    28 Nov 2022 07:01  -  28 Nov 2022 15:27  --------------------------------------------------------  IN: 400 mL / OUT: 0 mL / NET: 400 mL        PHYSICAL EXAM:  Vital Signs Last 24 Hrs  T(C): 36.8 (28 Nov 2022 12:52), Max: 37.3 (28 Nov 2022 06:34)  T(F): 98.3 (28 Nov 2022 12:52), Max: 99.1 (28 Nov 2022 06:34)  HR: 90 (28 Nov 2022 12:52) (67 - 100)  BP: 150/88 (28 Nov 2022 12:52) (112/74 - 168/92)  BP(mean): --  RR: 18 (28 Nov 2022 12:52) (18 - 18)  SpO2: 96% (28 Nov 2022 12:52) (93% - 99%)    Parameters below as of 28 Nov 2022 12:52  Patient On (Oxygen Delivery Method): nasal cannula  O2 Flow (L/min): 3      CONSTITUTIONAL: NAD, well-developed  RESPIRATORY: Normal respiratory effort; dyspnea on deep inhalation, lungs are clear to auscultation bilaterally  CARDIOVASCULAR: Regular rate and rhythm, normal S1 and S2, no murmur/rub/gallop; No lower extremity edema; Peripheral pulses are 2+ bilaterally  ABDOMEN: Mild tenderness to palpation, nonspecific, normoactive bowel sounds, no rebound/guarding; No hepatosplenomegaly  MUSCLOSKELETAL: no clubbing or cyanosis of digits; no joint swelling or tenderness to palpation  L arm wrapped, wound vac in place  PSYCH: A+O to person, place, and time; affect appropriate    LABS:                        7.7    13.30 )-----------( 305      ( 28 Nov 2022 08:43 )             24.1     11-28    128<L>  |  88<L>  |  75<H>  ----------------------------<  132<H>  5.1   |  20<L>  |  10.51<H>    Ca    9.5      28 Nov 2022 08:44  Phos  8.3     11-28  Mg     2.5     11-28                Culture - Blood (collected 27 Nov 2022 07:15)  Source: .Blood Blood-Venous  Preliminary Report (28 Nov 2022 10:02):    No growth to date.    Culture - Blood (collected 27 Nov 2022 07:15)  Source: .Blood Blood  Preliminary Report (28 Nov 2022 10:02):    No growth to date.        RADIOLOGY & ADDITIONAL TESTS:  Results Reviewed:   Imaging Personally Reviewed:  Electrocardiogram Personally Reviewed:    COORDINATION OF CARE:  Care Discussed with Consultants/Other Providers [Y/N]:  Prior or Outpatient Records Reviewed [Y/N]:

## 2022-11-29 LAB
ALBUMIN SERPL ELPH-MCNC: 3.3 G/DL — SIGNIFICANT CHANGE UP (ref 3.3–5)
ALP SERPL-CCNC: 270 U/L — HIGH (ref 40–120)
ALT FLD-CCNC: <5 U/L — LOW (ref 10–45)
ANION GAP SERPL CALC-SCNC: 18 MMOL/L — HIGH (ref 5–17)
AST SERPL-CCNC: 10 U/L — SIGNIFICANT CHANGE UP (ref 10–40)
AUTO DIFF PNL BLD: NEGATIVE — SIGNIFICANT CHANGE UP
BASE EXCESS BLDV CALC-SCNC: 2.7 MMOL/L — SIGNIFICANT CHANGE UP (ref -2–3)
BASOPHILS # BLD AUTO: 0.01 K/UL — SIGNIFICANT CHANGE UP (ref 0–0.2)
BASOPHILS NFR BLD AUTO: 0.1 % — SIGNIFICANT CHANGE UP (ref 0–2)
BILIRUB SERPL-MCNC: 0.4 MG/DL — SIGNIFICANT CHANGE UP (ref 0.2–1.2)
BUN SERPL-MCNC: 56 MG/DL — HIGH (ref 7–23)
C-ANCA SER-ACNC: NEGATIVE — SIGNIFICANT CHANGE UP
CA-I SERPL-SCNC: 1.24 MMOL/L — SIGNIFICANT CHANGE UP (ref 1.15–1.33)
CALCIUM SERPL-MCNC: 9.3 MG/DL — SIGNIFICANT CHANGE UP (ref 8.4–10.5)
CHLORIDE BLDV-SCNC: 94 MMOL/L — LOW (ref 96–108)
CHLORIDE SERPL-SCNC: 89 MMOL/L — LOW (ref 96–108)
CO2 BLDV-SCNC: 32 MMOL/L — HIGH (ref 22–26)
CO2 SERPL-SCNC: 23 MMOL/L — SIGNIFICANT CHANGE UP (ref 22–31)
CREAT SERPL-MCNC: 8.97 MG/DL — HIGH (ref 0.5–1.3)
CULTURE RESULTS: SIGNIFICANT CHANGE UP
EGFR: 7 ML/MIN/1.73M2 — LOW
EOSINOPHIL # BLD AUTO: 0 K/UL — SIGNIFICANT CHANGE UP (ref 0–0.5)
EOSINOPHIL NFR BLD AUTO: 0 % — SIGNIFICANT CHANGE UP (ref 0–6)
GAS PNL BLDV: 130 MMOL/L — LOW (ref 136–145)
GAS PNL BLDV: SIGNIFICANT CHANGE UP
GAS PNL BLDV: SIGNIFICANT CHANGE UP
GLUCOSE BLDV-MCNC: 283 MG/DL — HIGH (ref 70–99)
GLUCOSE SERPL-MCNC: 121 MG/DL — HIGH (ref 70–99)
GRAM STN FLD: SIGNIFICANT CHANGE UP
HCO3 BLDV-SCNC: 30 MMOL/L — HIGH (ref 22–29)
HCT VFR BLD CALC: 25.3 % — LOW (ref 39–50)
HCT VFR BLDA CALC: 29 % — LOW (ref 39–51)
HGB BLD CALC-MCNC: 9.5 G/DL — LOW (ref 12.6–17.4)
HGB BLD-MCNC: 7.8 G/DL — LOW (ref 13–17)
IMM GRANULOCYTES NFR BLD AUTO: 1.2 % — HIGH (ref 0–0.9)
INR BLD: 1.17 RATIO — HIGH (ref 0.88–1.16)
LACTATE BLDV-MCNC: 1.9 MMOL/L — SIGNIFICANT CHANGE UP (ref 0.5–2)
LIDOCAIN IGE QN: 109 U/L — HIGH (ref 7–60)
LYMPHOCYTES # BLD AUTO: 1.03 K/UL — SIGNIFICANT CHANGE UP (ref 1–3.3)
LYMPHOCYTES # BLD AUTO: 9.1 % — LOW (ref 13–44)
MAGNESIUM SERPL-MCNC: 2.4 MG/DL — SIGNIFICANT CHANGE UP (ref 1.6–2.6)
MCHC RBC-ENTMCNC: 30.8 GM/DL — LOW (ref 32–36)
MCHC RBC-ENTMCNC: 31.1 PG — SIGNIFICANT CHANGE UP (ref 27–34)
MCV RBC AUTO: 100.8 FL — HIGH (ref 80–100)
MONOCYTES # BLD AUTO: 0.8 K/UL — SIGNIFICANT CHANGE UP (ref 0–0.9)
MONOCYTES NFR BLD AUTO: 7.1 % — SIGNIFICANT CHANGE UP (ref 2–14)
NEUTROPHILS # BLD AUTO: 9.32 K/UL — HIGH (ref 1.8–7.4)
NEUTROPHILS NFR BLD AUTO: 82.5 % — HIGH (ref 43–77)
NRBC # BLD: 0 /100 WBCS — SIGNIFICANT CHANGE UP (ref 0–0)
P-ANCA SER-ACNC: NEGATIVE — SIGNIFICANT CHANGE UP
PCO2 BLDV: 63 MMHG — HIGH (ref 42–55)
PH BLDV: 7.29 — LOW (ref 7.32–7.43)
PHOSPHATE SERPL-MCNC: 7.7 MG/DL — HIGH (ref 2.5–4.5)
PLATELET # BLD AUTO: 332 K/UL — SIGNIFICANT CHANGE UP (ref 150–400)
PO2 BLDV: 21 MMHG — LOW (ref 25–45)
POTASSIUM BLDV-SCNC: 4.9 MMOL/L — SIGNIFICANT CHANGE UP (ref 3.5–5.1)
POTASSIUM SERPL-MCNC: 5.5 MMOL/L — HIGH (ref 3.5–5.3)
POTASSIUM SERPL-SCNC: 5.5 MMOL/L — HIGH (ref 3.5–5.3)
PROT SERPL-MCNC: 8.1 G/DL — SIGNIFICANT CHANGE UP (ref 6–8.3)
PROTHROM AB SERPL-ACNC: 13.6 SEC — HIGH (ref 10.5–13.4)
RBC # BLD: 2.51 M/UL — LOW (ref 4.2–5.8)
RBC # FLD: 15.7 % — HIGH (ref 10.3–14.5)
SAO2 % BLDV: 28.4 % — LOW (ref 67–88)
SODIUM SERPL-SCNC: 130 MMOL/L — LOW (ref 135–145)
SOLUBLE LIVER IGG SER IA-ACNC: 0.7 — SIGNIFICANT CHANGE UP (ref 0–20)
SPECIMEN SOURCE: SIGNIFICANT CHANGE UP
SPECIMEN SOURCE: SIGNIFICANT CHANGE UP
WBC # BLD: 11.3 K/UL — HIGH (ref 3.8–10.5)
WBC # FLD AUTO: 11.3 K/UL — HIGH (ref 3.8–10.5)

## 2022-11-29 PROCEDURE — 99232 SBSQ HOSP IP/OBS MODERATE 35: CPT

## 2022-11-29 PROCEDURE — 99233 SBSQ HOSP IP/OBS HIGH 50: CPT

## 2022-11-29 RX ADMIN — HEPARIN SODIUM 5000 UNIT(S): 5000 INJECTION INTRAVENOUS; SUBCUTANEOUS at 18:44

## 2022-11-29 RX ADMIN — HYDROMORPHONE HYDROCHLORIDE 4 MILLIGRAM(S): 2 INJECTION INTRAMUSCULAR; INTRAVENOUS; SUBCUTANEOUS at 20:04

## 2022-11-29 RX ADMIN — CHLORHEXIDINE GLUCONATE 1 APPLICATION(S): 213 SOLUTION TOPICAL at 08:30

## 2022-11-29 RX ADMIN — RISPERIDONE 2 MILLIGRAM(S): 4 TABLET ORAL at 18:44

## 2022-11-29 RX ADMIN — HYDROMORPHONE HYDROCHLORIDE 2 MILLIGRAM(S): 2 INJECTION INTRAMUSCULAR; INTRAVENOUS; SUBCUTANEOUS at 05:36

## 2022-11-29 RX ADMIN — Medication 100 MILLIGRAM(S): at 18:49

## 2022-11-29 RX ADMIN — QUETIAPINE FUMARATE 300 MILLIGRAM(S): 200 TABLET, FILM COATED ORAL at 21:49

## 2022-11-29 RX ADMIN — Medication 100 MILLIGRAM(S): at 21:49

## 2022-11-29 RX ADMIN — Medication 100 MILLIGRAM(S): at 18:45

## 2022-11-29 RX ADMIN — SEVELAMER CARBONATE 800 MILLIGRAM(S): 2400 POWDER, FOR SUSPENSION ORAL at 08:29

## 2022-11-29 RX ADMIN — HEPARIN SODIUM 5000 UNIT(S): 5000 INJECTION INTRAVENOUS; SUBCUTANEOUS at 21:49

## 2022-11-29 RX ADMIN — HYDROMORPHONE HYDROCHLORIDE 4 MILLIGRAM(S): 2 INJECTION INTRAMUSCULAR; INTRAVENOUS; SUBCUTANEOUS at 21:04

## 2022-11-29 RX ADMIN — SIMVASTATIN 40 MILLIGRAM(S): 20 TABLET, FILM COATED ORAL at 21:49

## 2022-11-29 RX ADMIN — HEPARIN SODIUM 5000 UNIT(S): 5000 INJECTION INTRAVENOUS; SUBCUTANEOUS at 05:31

## 2022-11-29 RX ADMIN — SEVELAMER CARBONATE 800 MILLIGRAM(S): 2400 POWDER, FOR SUSPENSION ORAL at 12:41

## 2022-11-29 RX ADMIN — SEVELAMER CARBONATE 800 MILLIGRAM(S): 2400 POWDER, FOR SUSPENSION ORAL at 18:55

## 2022-11-29 RX ADMIN — MIRTAZAPINE 15 MILLIGRAM(S): 45 TABLET, ORALLY DISINTEGRATING ORAL at 21:49

## 2022-11-29 RX ADMIN — Medication 100 MILLIGRAM(S): at 05:31

## 2022-11-29 RX ADMIN — Medication 20 MILLIGRAM(S): at 12:42

## 2022-11-29 RX ADMIN — HYDROMORPHONE HYDROCHLORIDE 2 MILLIGRAM(S): 2 INJECTION INTRAMUSCULAR; INTRAVENOUS; SUBCUTANEOUS at 06:36

## 2022-11-29 NOTE — PROGRESS NOTE ADULT - PROBLEM SELECTOR PLAN 4
-per pt productive cough started on 11/23  -Will send RVP  -c/w PRN guaifenesin, tessalon perles   -likely etiology of patient's costochondritis on his R rib  -CXR showed no consolidations  -incentive spirometry  - r/o PE, dimer and dopplers

## 2022-11-29 NOTE — PROGRESS NOTE ADULT - SUBJECTIVE AND OBJECTIVE BOX
Anesthesiology Event Note:    Patient was supposed to have ALBINO today to rule out endocarditis. Patient's potassium was 5.5 today. Was to have ALBINO but then instead taken to HD. While I am comfortable taking care of patient in Cardiology suite, because of his comorbidities, it would likely be safer to perform in the OR. Case delayed until 11/30/22. Will schedule for OR ALBINO. Cardiology team aware.

## 2022-11-29 NOTE — PROGRESS NOTE ADULT - SUBJECTIVE AND OBJECTIVE BOX
Jonel Kwan MD  Division of Hospital Medicine  Available on MS teams until 7pm  If no response or off-hours, page [phone]  -------------------------------------    Patient is a 42y old  Male who presents with a chief complaint of fevers and chills (29 Nov 2022 13:43)      SUBJECTIVE / OVERNIGHT EVENTS: none acute  ADDITIONAL REVIEW OF SYSTEMS: pt comfortable, no fevers/chills, no aches/pains.     MEDICATIONS  (STANDING):  benzonatate 100 milliGRAM(s) Oral every 8 hours  ceFAZolin   IVPB 1000 milliGRAM(s) IV Intermittent every 24 hours  chlorhexidine 4% Liquid 1 Application(s) Topical <User Schedule>  epoetin cyndie-epbx (RETACRIT) Injectable 42066 Unit(s) IV Push <User Schedule>  heparin   Injectable 5000 Unit(s) SubCutaneous every 8 hours  lidocaine   4% Patch 1 Patch Transdermal once  mirtazapine 15 milliGRAM(s) Oral at bedtime  PARoxetine 20 milliGRAM(s) Oral daily  QUEtiapine 300 milliGRAM(s) Oral at bedtime  risperiDONE   Tablet 2 milliGRAM(s) Oral daily  sevelamer carbonate 800 milliGRAM(s) Oral three times a day with meals  simvastatin 40 milliGRAM(s) Oral at bedtime    MEDICATIONS  (PRN):  acetaminophen   IVPB .. 1000 milliGRAM(s) IV Intermittent every 6 hours PRN Moderate Pain (4 - 6)  guaiFENesin  milliGRAM(s) Oral every 12 hours PRN Cough  HYDROmorphone   Tablet 2 milliGRAM(s) Oral every 4 hours PRN Moderate Pain (4 - 6)  HYDROmorphone   Tablet 4 milliGRAM(s) Oral every 4 hours PRN Severe Pain (7 - 10)  nalbuphine Injectable 2.5 milliGRAM(s) IV Push every 6 hours PRN Pruritus  naloxone Injectable 0.1 milliGRAM(s) IV Push every 3 minutes PRN For ANY of the following changes in patient status:  A. RR LESS THAN 10 breaths per minute, B. Oxygen saturation LESS THAN 90%, C. Sedation score of 6  ondansetron Injectable 4 milliGRAM(s) IV Push every 6 hours PRN Nausea  ondansetron Injectable 4 milliGRAM(s) IV Push every 6 hours PRN Nausea and/or Vomiting  sodium chloride 0.9% lock flush 10 milliLiter(s) IV Push every 1 hour PRN Pre/post blood products, medications, blood draw, and to maintain line patency      CAPILLARY BLOOD GLUCOSE        I&O's Summary    28 Nov 2022 07:01  -  29 Nov 2022 07:00  --------------------------------------------------------  IN: 520 mL / OUT: 4000 mL / NET: -3480 mL    29 Nov 2022 07:01  -  29 Nov 2022 16:30  --------------------------------------------------------  IN: 0 mL / OUT: 0 mL / NET: 0 mL        PHYSICAL EXAM:  Vital Signs Last 24 Hrs  T(C): 36.5 (29 Nov 2022 14:47), Max: 37.4 (29 Nov 2022 06:07)  T(F): 97.7 (29 Nov 2022 14:47), Max: 99.3 (29 Nov 2022 06:07)  HR: 84 (29 Nov 2022 14:47) (81 - 101)  BP: 156/92 (29 Nov 2022 14:47) (124/71 - 156/92)  BP(mean): 103 (29 Nov 2022 08:52) (103 - 103)  RR: 18 (29 Nov 2022 14:47) (17 - 18)  SpO2: 96% (29 Nov 2022 14:47) (95% - 100%)    Parameters below as of 29 Nov 2022 14:47  Patient On (Oxygen Delivery Method): nasal cannula  O2 Flow (L/min): 3    CONSTITUTIONAL: NAD, obese  EYES: PERRLA; conjunctiva and sclera clear  ENMT: MMM  NECK: Supple  RESPIRATORY: Normal respiratory effort; CTAB  CARDIOVASCULAR: RRR, no JVD, no peripheral edema, +LUE dressed/bandaged  ABDOMEN: Nontender to palpation, normoactive BS, no guarding/rigidity  MUSCLOSKELETAL:  no clubbing/cyanosis, no joint swelling or tenderness to palpation  PSYCH: A+O x 3, affect normal  NEUROLOGY: CN 2-12 are intact and symmetric; no gross sensory or motor deficits  SKIN: No rashes; no palpable lesions    LABS:                        7.8    11.30 )-----------( 332      ( 29 Nov 2022 10:42 )             25.3     11-29    130<L>  |  89<L>  |  56<H>  ----------------------------<  121<H>  5.5<H>   |  23  |  8.97<H>    Ca    9.3      29 Nov 2022 10:42  Phos  7.7     11-29  Mg     2.4     11-29    TPro  8.1  /  Alb  3.3  /  TBili  0.4  /  DBili  x   /  AST  10  /  ALT  <5<L>  /  AlkPhos  270<H>  11-29    PT/INR - ( 29 Nov 2022 10:42 )   PT: 13.6 sec;   INR: 1.17 ratio                   Culture - Blood (collected 27 Nov 2022 07:15)  Source: .Blood Blood-Venous  Preliminary Report (28 Nov 2022 10:02):    No growth to date.    Culture - Blood (collected 27 Nov 2022 07:15)  Source: .Blood Blood  Preliminary Report (28 Nov 2022 10:02):    No growth to date.        RADIOLOGY & ADDITIONAL TESTS:  Results Reviewed:   Imaging Personally Reviewed:  Electrocardiogram Personally Reviewed:    COORDINATION OF CARE:  Care Discussed with Consultants/Other Providers [Y/N]: floor np  Prior or Outpatient Records Reviewed [Y/N]:

## 2022-11-29 NOTE — PROGRESS NOTE ADULT - PROBLEM SELECTOR PLAN 5
-Noted on CT w/ hepatomegaly   -likely 2/2 MAYER  -CMP and INR ordered for the AM   -depending on results may need inpt vs outpt Hep eval

## 2022-11-29 NOTE — PROGRESS NOTE ADULT - ASSESSMENT
42-year-old male with past medical history ESRD on HD (at home, M to F via AV graft), COPD on home oxygen 4L, bipolar disorder, schizophrenia presents with fever and chills x1 day. Patient states he had a fever of up to 104 at home and in [facility] was Tmax 100.6    ESRD on HD  Infected access, MSSA bacteremia, Septic Pulm emboli  S/P AVG excision 11/23/22  s/p HD yesterday- tolerated well--> Plan for repeat HD today given hyperkalemia and then back to routine HD tomm  ALBINO planned tomm  On Cefazolin per Infectious disease specialist  Has right IJ Selam  Will need ID clearance for Permacath Placement  Renal diet  Daily BMP    HTN  UF on HD  resume home BP med regimen  overall bp better    Anemia of CKD  Epo during HD  20k tiw with hd  trend hgb    BMD of CKD  Hyperphosphatemia  renvela with meals  trend etta phos    Hyponatremia  likely volume related  UF on HD  trend na      Dr Mauricio  [phone]

## 2022-11-29 NOTE — PROGRESS NOTE ADULT - SUBJECTIVE AND OBJECTIVE BOX
Patient seen and examined  no complaints    aspirin (Swelling)  fish (Unknown)  penicillins (Swelling)  shellfish (Rash)    Hospital Medications:   MEDICATIONS  (STANDING):  benzonatate 100 milliGRAM(s) Oral every 8 hours  ceFAZolin   IVPB 1000 milliGRAM(s) IV Intermittent every 24 hours  chlorhexidine 4% Liquid 1 Application(s) Topical <User Schedule>  epoetin cyndie-epbx (RETACRIT) Injectable 79270 Unit(s) IV Push <User Schedule>  heparin   Injectable 5000 Unit(s) SubCutaneous every 8 hours  lidocaine   4% Patch 1 Patch Transdermal once  mirtazapine 15 milliGRAM(s) Oral at bedtime  PARoxetine 20 milliGRAM(s) Oral daily  QUEtiapine 300 milliGRAM(s) Oral at bedtime  risperiDONE   Tablet 2 milliGRAM(s) Oral daily  sevelamer carbonate 800 milliGRAM(s) Oral three times a day with meals  simvastatin 40 milliGRAM(s) Oral at bedtime        VITALS:  T(F): 99.2 (11-29-22 @ 13:00), Max: 99.3 (11-29-22 @ 06:07)  HR: 100 (11-29-22 @ 13:00)  BP: 135/84 (11-29-22 @ 13:00)  RR: 18 (11-29-22 @ 13:00)  SpO2: 96% (11-29-22 @ 13:00)  Wt(kg): --    11-28 @ 07:01  -  11-29 @ 07:00  --------------------------------------------------------  IN: 520 mL / OUT: 4000 mL / NET: -3480 mL      Height (cm): 200.7 (11-29 @ 08:52)  Weight (kg): 196.6 (11-29 @ 08:52)  BMI (kg/m2): 48.8 (11-29 @ 08:52)  BSA (m2): 3.17 (11-29 @ 08:52)    Physical Exam :-  Constitutional: NAD  Neck: Supple.  Respiratory: Bilateral equal breath sounds,  Cardiovascular: S1, S2 normal,  Gastrointestinal: Bowel Sounds present, soft, non tender.  Extremities: trace edema, LUE clean dressing  Neurological: Alert and Oriented x 3, no focal deficits  Psychiatric: Normal mood, normal affect    LABS:  11-29    130<L>  |  89<L>  |  56<H>  ----------------------------<  121<H>  5.5<H>   |  23  |  8.97<H>    Ca    9.3      29 Nov 2022 10:42  Phos  7.7     11-29  Mg     2.4     11-29    TPro  8.1  /  Alb  3.3  /  TBili  0.4  /  DBili      /  AST  10  /  ALT  <5<L>  /  AlkPhos  270<H>  11-29    Creatinine Trend: 8.97 <--, 10.51 <--, 8.46 <--, 9.64 <--, 12.11 <--, 10.55 <--, 8.70 <--                        7.8    11.30 )-----------( 332      ( 29 Nov 2022 10:42 )             25.3     Urine Studies:      RADIOLOGY & ADDITIONAL STUDIES:

## 2022-11-29 NOTE — PROGRESS NOTE ADULT - ASSESSMENT
42-year-old male with past medical history ESRD on HD (at home, M to F via AV graft), COPD on home oxygen 4L, bipolar disorder, schizophrenia presented to [place] fever and chills x1 day. Transferred to [facility] for continued MSSA bacteremia management and further imaging, now s/p LUE AV graft excision 11/23 now pending TTE

## 2022-11-29 NOTE — PROGRESS NOTE ADULT - ASSESSMENT
42M BMI 48, COPD on home oxygen, hypertensive nephropathy on dialysis, bipolar disorder, schizophrenia.   Here 11/19 with sepsis, high grade MSSA bacteremia.   From left arm AV graft, excised 11/23.   Clearing 11/27 and TTE without vegetations but CT suggests pulmonary septic emboli.   Stable. Leukocytosis likely reactive, downtrending today. D-Dimer likely reactive too - no DVT and chest pain seems musculoskeletal.     Suggest  -Ancef 1GM IV q24h post dialysis - eventually can discharge on three times weekly 2GM, 2GM, 3GM   -monitor blood cultures   -repeat two sets of blood cultures   -check ALBINO   -will wait for blood cultures to finalize before permacath, most likely early next week   -eventual 6-week course of antibiotics from negative cultures     Discussed with medicine   I will be away tomorrow, back 12/1. Please call the office if follow up is needed tomorrow, [phone]    Keven Eduardo MD   Infectious Disease   Available on TEAMS. After 5PM and on weekends please page fellow on call or call [phone]

## 2022-11-29 NOTE — PROGRESS NOTE ADULT - SUBJECTIVE AND OBJECTIVE BOX
Follow Up: MSSA    Interval History/ROS: Afebrile, arm pain is ok. WBC downtrending. Chest pain better.     Allergies  aspirin (Swelling)  fish (Unknown)  penicillins (Swelling)  shellfish (Rash)        ANTIMICROBIALS:  ceFAZolin   IVPB 1000 every 24 hours      OTHER MEDS:  acetaminophen   IVPB .. 1000 milliGRAM(s) IV Intermittent every 6 hours PRN  benzonatate 100 milliGRAM(s) Oral every 8 hours  chlorhexidine 4% Liquid 1 Application(s) Topical <User Schedule>  epoetin cyndie-epbx (RETACRIT) Injectable 99749 Unit(s) IV Push <User Schedule>  guaiFENesin  milliGRAM(s) Oral every 12 hours PRN  heparin   Injectable 5000 Unit(s) SubCutaneous every 8 hours  HYDROmorphone   Tablet 2 milliGRAM(s) Oral every 4 hours PRN  HYDROmorphone   Tablet 4 milliGRAM(s) Oral every 4 hours PRN  lidocaine   4% Patch 1 Patch Transdermal once  mirtazapine 15 milliGRAM(s) Oral at bedtime  nalbuphine Injectable 2.5 milliGRAM(s) IV Push every 6 hours PRN  naloxone Injectable 0.1 milliGRAM(s) IV Push every 3 minutes PRN  ondansetron Injectable 4 milliGRAM(s) IV Push every 6 hours PRN  ondansetron Injectable 4 milliGRAM(s) IV Push every 6 hours PRN  PARoxetine 20 milliGRAM(s) Oral daily  QUEtiapine 300 milliGRAM(s) Oral at bedtime  risperiDONE   Tablet 2 milliGRAM(s) Oral daily  sevelamer carbonate 800 milliGRAM(s) Oral three times a day with meals  simvastatin 40 milliGRAM(s) Oral at bedtime  sodium chloride 0.9% lock flush 10 milliLiter(s) IV Push every 1 hour PRN      Vital Signs Last 24 Hrs  T(C): 36.5 (29 Nov 2022 14:47), Max: 37.4 (29 Nov 2022 06:07)  T(F): 97.7 (29 Nov 2022 14:47), Max: 99.3 (29 Nov 2022 06:07)  HR: 84 (29 Nov 2022 14:47) (81 - 101)  BP: 156/92 (29 Nov 2022 14:47) (124/71 - 156/92)  BP(mean): 103 (29 Nov 2022 08:52) (103 - 103)  RR: 18 (29 Nov 2022 14:47) (17 - 18)  SpO2: 96% (29 Nov 2022 14:47) (95% - 100%)    Parameters below as of 29 Nov 2022 14:47  Patient On (Oxygen Delivery Method): nasal cannula  O2 Flow (L/min): 3      Physical Exam:  General: non toxic, obese, drowsy   Cardio: regular rate   Respiratory: nonlabored nasal cannula   abd: nondistended  Musculoskeletal: left arm dressed   vascular: right IJ CVC, no phlebitis   Skin: no rash                          7.8    11.30 )-----------( 332      ( 29 Nov 2022 10:42 )             25.3       11-29    130<L>  |  89<L>  |  56<H>  ----------------------------<  121<H>  5.5<H>   |  23  |  8.97<H>    Ca    9.3      29 Nov 2022 10:42  Phos  7.7     11-29  Mg     2.4     11-29    TPro  8.1  /  Alb  3.3  /  TBili  0.4  /  DBili  x   /  AST  10  /  ALT  <5<L>  /  AlkPhos  270<H>  11-29          MICROBIOLOGY:  Culture - Blood (collected 11-27-22 @ 07:15)  Source: .Blood Blood-Venous  Preliminary Report (11-28-22 @ 10:02):    No growth to date.    Culture - Blood (collected 11-27-22 @ 07:15)  Source: .Blood Blood  Preliminary Report (11-28-22 @ 10:02):    No growth to date.    Culture - Blood (collected 11-24-22 @ 11:57)  Source: .Blood Blood-Peripheral  Gram Stain (11-29-22 @ 13:12):    Growth in aerobic bottle: Gram Positive Cocci in Clusters    Growth in anaerobic bottle: Gram Positive Cocci in Clusters  Final Report (11-29-22 @ 13:12):    Growth in aerobic bottle: Staphylococcus aureus    Growth in anaerobic bottle: Gram Positive Cocci in Clusters  Organism: Staphylococcus aureus (11-27-22 @ 08:01)  Organism: Staphylococcus aureus (11-27-22 @ 08:01)      -  Ampicillin/Sulbactam: S <=8/4      -  Cefazolin: S <=4      -  Clindamycin: S <=0.25      -  Erythromycin: I 4      -  Gentamicin: S <=1 Should not be used as monotherapy      -  Oxacillin: S <=0.25 Oxacillin predicts susceptibility for dicloxacillin, methicillin, and nafcillin      -  Penicillin: R 8      -  Rifampin: S <=1 Should not be used as monotherapy      -  Tetracycline: S <=1      -  Trimethoprim/Sulfamethoxazole: S <=0.5/9.5      -  Vancomycin: S 2      Method Type: LISA    Culture - Blood (collected 11-24-22 @ 10:55)  Source: .Blood Blood-Peripheral  Final Report (11-29-22 @ 14:01):    No Growth Final    Culture - Surgical Swab (collected 11-23-22 @ 22:02)  Source: .Surgical Swab LEFT ARM INFECTED FISTULA  Final Report (11-28-22 @ 21:45):    Few Staphylococcus aureus  Organism: Staphylococcus aureus (11-28-22 @ 21:45)  Organism: Staphylococcus aureus (11-28-22 @ 21:45)      -  Ampicillin/Sulbactam: S <=8/4      -  Cefazolin: S <=4      -  Clindamycin: S <=0.25      -  Erythromycin: S <=0.25      -  Gentamicin: S <=1 Should not be used as monotherapy      -  Oxacillin: S <=0.25 Oxacillin predicts susceptibility for dicloxacillin, methicillin, and nafcillin      -  Penicillin: R 4      -  Rifampin: S <=1 Should not be used as monotherapy      -  Tetracycline: S <=1      -  Trimethoprim/Sulfamethoxazole: S <=0.5/9.5      -  Vancomycin: S 2      Method Type: LISA    Culture - Blood (collected 11-23-22 @ 09:35)  Source: .Blood Blood-Peripheral  Gram Stain (11-27-22 @ 20:57):    Growth in anaerobic bottle: Gram Positive Cocci in Clusters  Final Report (11-28-22 @ 14:23):    Growth in anaerobic bottle: Staphylococcus aureus    See previous culture [id number]    Culture - Blood (collected 11-23-22 @ 09:35)  Source: .Blood Blood-Peripheral  Gram Stain (11-28-22 @ 08:22):    Growth in aerobic bottle: Gram Positive Cocci in Clusters    Growth in anaerobic bottle: Gram Positive Cocci in Clusters  Final Report (11-29-22 @ 09:18):    Growth in aerobic and anaerobic bottles: Staphylococcus aureus    See previous culture [id number]    Rapid RVP Result: NotDetec (11-28 @ 18:10)      RADIOLOGY:  Images below reviewed personally  VA Duplex Upper Ext Vein Scan, Bilat (11.28.22 @ 17:39)   No evidence of deep venous thrombosis in either upper extremity.  Limited study, nonvisualization of multiple venous segments as above    VA Duplex Lower Ext Vein Scan, Bilat (11.28.22 @ 17:39)   No evidence of deep venous thrombosis in either lower extremity.    Xray Chest 1 View- PORTABLE-Urgent (Xray Chest 1 View- PORTABLE-Urgent .) (11.27.22 @ 23:41)   Interval improvement of pulmonary edema.

## 2022-11-29 NOTE — PROGRESS NOTE ADULT - PROBLEM SELECTOR PLAN 3
Patient complete dialysis at home M/Tue/off Wed/Thur/ off Fri/ Sat/ Sun. AVF in place (prior hx of aneurysm per [facility] records)  - currently getting dialysis through [facility]  - Will need permacath placement when cleared by ID  -HD per nephro

## 2022-11-29 NOTE — PROGRESS NOTE ADULT - PROBLEM SELECTOR PLAN 1
Found to have MSSA bacteremia 2/2 possible AVF infection d/t noted purulence. Transferred to [facility] for further imaging. S/p empiric treatment with vanc (11/19) and cefepime (11/19). Continued on ancef. Leukocytosis improved. LUE negative for findings of DVT.     -blood culture - MSSA, cultures 11/24 still positive, 11/27 prelim negative. Will need 6 week course of abx from negative culture, PICC line when closer to discharge   - c/w Ancef 11/20-  - s/p R IJ shiley placed 11/21   - CT Chest suggesting septic emboli to lungs  - CT angio of LUE suggests graft infections- s/p LUE graft excision on 11/23 now on wound vac   - TTE unchanged from 2019, would pursue ALBINO given persistence of pt's bacteremia  - noted gradually uptrending WBC, reactive vs infection, continue to monitor for clinical signs of infection

## 2022-11-29 NOTE — PROGRESS NOTE ADULT - PROBLEM SELECTOR PLAN 6
-not on any home antihypertensives  -pressures here have been variable, if persistently hypertensive can start medications

## 2022-11-29 NOTE — PROGRESS NOTE ADULT - PROBLEM SELECTOR PLAN 11
- Diet: renal   - DVT: heparin subQ  - PT    dispo: likely home pending ALBINO, final abx plan, likely permacath placement for ongoing HD

## 2022-11-30 LAB
ANION GAP SERPL CALC-SCNC: 18 MMOL/L — HIGH (ref 5–17)
BUN SERPL-MCNC: 55 MG/DL — HIGH (ref 7–23)
CALCIUM SERPL-MCNC: 9.4 MG/DL — SIGNIFICANT CHANGE UP (ref 8.4–10.5)
CHLORIDE SERPL-SCNC: 90 MMOL/L — LOW (ref 96–108)
CO2 SERPL-SCNC: 21 MMOL/L — LOW (ref 22–31)
CREAT SERPL-MCNC: 8.31 MG/DL — HIGH (ref 0.5–1.3)
CULTURE RESULTS: SIGNIFICANT CHANGE UP
EGFR: 8 ML/MIN/1.73M2 — LOW
GLUCOSE BLDC GLUCOMTR-MCNC: 166 MG/DL — HIGH (ref 70–99)
GLUCOSE SERPL-MCNC: 129 MG/DL — HIGH (ref 70–99)
HCT VFR BLD CALC: 24.8 % — LOW (ref 39–50)
HGB BLD-MCNC: 7.5 G/DL — LOW (ref 13–17)
MAGNESIUM SERPL-MCNC: 2.2 MG/DL — SIGNIFICANT CHANGE UP (ref 1.6–2.6)
MCHC RBC-ENTMCNC: 30.2 GM/DL — LOW (ref 32–36)
MCHC RBC-ENTMCNC: 30.9 PG — SIGNIFICANT CHANGE UP (ref 27–34)
MCV RBC AUTO: 102.1 FL — HIGH (ref 80–100)
NRBC # BLD: 0 /100 WBCS — SIGNIFICANT CHANGE UP (ref 0–0)
PHOSPHATE SERPL-MCNC: 6.7 MG/DL — HIGH (ref 2.5–4.5)
PLATELET # BLD AUTO: 349 K/UL — SIGNIFICANT CHANGE UP (ref 150–400)
POTASSIUM SERPL-MCNC: 5.4 MMOL/L — HIGH (ref 3.5–5.3)
POTASSIUM SERPL-SCNC: 5.4 MMOL/L — HIGH (ref 3.5–5.3)
RBC # BLD: 2.43 M/UL — LOW (ref 4.2–5.8)
RBC # FLD: 15.7 % — HIGH (ref 10.3–14.5)
SODIUM SERPL-SCNC: 129 MMOL/L — LOW (ref 135–145)
SURGICAL PATHOLOGY STUDY: SIGNIFICANT CHANGE UP
WBC # BLD: 11.84 K/UL — HIGH (ref 3.8–10.5)
WBC # FLD AUTO: 11.84 K/UL — HIGH (ref 3.8–10.5)

## 2022-11-30 PROCEDURE — 93306 TTE W/DOPPLER COMPLETE: CPT | Mod: 26

## 2022-11-30 PROCEDURE — 93312 ECHO TRANSESOPHAGEAL: CPT | Mod: 26

## 2022-11-30 PROCEDURE — 99233 SBSQ HOSP IP/OBS HIGH 50: CPT

## 2022-11-30 RX ORDER — FENTANYL CITRATE 50 UG/ML
25 INJECTION INTRAVENOUS
Refills: 0 | Status: DISCONTINUED | OUTPATIENT
Start: 2022-11-30 | End: 2022-11-30

## 2022-11-30 RX ORDER — HYDROMORPHONE HYDROCHLORIDE 2 MG/ML
1 INJECTION INTRAMUSCULAR; INTRAVENOUS; SUBCUTANEOUS ONCE
Refills: 0 | Status: DISCONTINUED | OUTPATIENT
Start: 2022-11-30 | End: 2022-11-30

## 2022-11-30 RX ORDER — CHLORHEXIDINE GLUCONATE 213 G/1000ML
1 SOLUTION TOPICAL DAILY
Refills: 0 | Status: DISCONTINUED | OUTPATIENT
Start: 2022-11-30 | End: 2022-12-09

## 2022-11-30 RX ORDER — CEFAZOLIN SODIUM 1 G
1000 VIAL (EA) INJECTION EVERY 24 HOURS
Refills: 0 | Status: DISCONTINUED | OUTPATIENT
Start: 2022-11-30 | End: 2022-12-09

## 2022-11-30 RX ORDER — HEPARIN SODIUM 5000 [USP'U]/ML
5000 INJECTION INTRAVENOUS; SUBCUTANEOUS EVERY 8 HOURS
Refills: 0 | Status: DISCONTINUED | OUTPATIENT
Start: 2022-11-30 | End: 2022-12-02

## 2022-11-30 RX ORDER — MIRTAZAPINE 45 MG/1
15 TABLET, ORALLY DISINTEGRATING ORAL AT BEDTIME
Refills: 0 | Status: DISCONTINUED | OUTPATIENT
Start: 2022-11-30 | End: 2022-12-09

## 2022-11-30 RX ORDER — HYDROMORPHONE HYDROCHLORIDE 2 MG/ML
4 INJECTION INTRAMUSCULAR; INTRAVENOUS; SUBCUTANEOUS EVERY 6 HOURS
Refills: 0 | Status: DISCONTINUED | OUTPATIENT
Start: 2022-11-30 | End: 2022-12-01

## 2022-11-30 RX ORDER — SODIUM CHLORIDE 9 MG/ML
1000 INJECTION, SOLUTION INTRAVENOUS
Refills: 0 | Status: DISCONTINUED | OUTPATIENT
Start: 2022-11-30 | End: 2022-11-30

## 2022-11-30 RX ADMIN — CHLORHEXIDINE GLUCONATE 1 APPLICATION(S): 213 SOLUTION TOPICAL at 08:55

## 2022-11-30 RX ADMIN — HYDROMORPHONE HYDROCHLORIDE 4 MILLIGRAM(S): 2 INJECTION INTRAMUSCULAR; INTRAVENOUS; SUBCUTANEOUS at 21:04

## 2022-11-30 RX ADMIN — HYDROMORPHONE HYDROCHLORIDE 1 MILLIGRAM(S): 2 INJECTION INTRAMUSCULAR; INTRAVENOUS; SUBCUTANEOUS at 23:39

## 2022-11-30 RX ADMIN — Medication 100 MILLIGRAM(S): at 06:06

## 2022-11-30 RX ADMIN — HYDROMORPHONE HYDROCHLORIDE 4 MILLIGRAM(S): 2 INJECTION INTRAMUSCULAR; INTRAVENOUS; SUBCUTANEOUS at 07:06

## 2022-11-30 RX ADMIN — HEPARIN SODIUM 5000 UNIT(S): 5000 INJECTION INTRAVENOUS; SUBCUTANEOUS at 23:40

## 2022-11-30 RX ADMIN — MIRTAZAPINE 15 MILLIGRAM(S): 45 TABLET, ORALLY DISINTEGRATING ORAL at 23:40

## 2022-11-30 RX ADMIN — SEVELAMER CARBONATE 800 MILLIGRAM(S): 2400 POWDER, FOR SUSPENSION ORAL at 08:54

## 2022-11-30 RX ADMIN — HEPARIN SODIUM 5000 UNIT(S): 5000 INJECTION INTRAVENOUS; SUBCUTANEOUS at 06:06

## 2022-11-30 RX ADMIN — HYDROMORPHONE HYDROCHLORIDE 4 MILLIGRAM(S): 2 INJECTION INTRAMUSCULAR; INTRAVENOUS; SUBCUTANEOUS at 06:06

## 2022-11-30 RX ADMIN — ERYTHROPOIETIN 20000 UNIT(S): 10000 INJECTION, SOLUTION INTRAVENOUS; SUBCUTANEOUS at 11:54

## 2022-11-30 RX ADMIN — Medication 100 MILLIGRAM(S): at 23:40

## 2022-11-30 RX ADMIN — HYDROMORPHONE HYDROCHLORIDE 4 MILLIGRAM(S): 2 INJECTION INTRAMUSCULAR; INTRAVENOUS; SUBCUTANEOUS at 22:04

## 2022-11-30 NOTE — PRE-ANESTHESIA EVALUATION ADULT - NSANTHAIRWAYFT_ENT_ALL_CORE
Thyromental distance >3 FB.  Neck circumference <17 cm.  Mouth opening >2 cm.  FROM of neck.
FROM  Neck circumference >17cm  Interincisor distance <2 finger breadths  TM distance >2 finger breadths
TM > 3 FB, MO > 3 FB, Full neck AROM

## 2022-11-30 NOTE — PROGRESS NOTE ADULT - ASSESSMENT
42-year-old male with past medical history ESRD on HD (at home, M to F via AV graft), COPD on home oxygen 4L, bipolar disorder, schizophrenia presents with fever and chills x1 day. Patient states he had a fever of up to 104 at home and in [place] ED was Tmax 100.6    ESRD on HD  Infected access, MSSA bacteremia, Septic Pulm emboli  S/P AVG excision 11/23/22  s/p extra HD yesterday 2/2 High k- tolerated well--> Plan for HD today then will go for ALBINO   On Cefazolin per Infectious disease specialist  Has right IJ Selam  Will need ID clearance for Permacath Placement  Renal diet  Daily BMP    HTN  UF on HD  resume home BP med regimen  overall bp better    Anemia of CKD  Epo during HD  20k tiw with hd  trend hgb    BMD of CKD  Hyperphosphatemia- phos is 6.7  renvela with meals  trend etta phos    Hyponatremia  likely volume related  UF on HD  trend na      Dr Mauricio  [phone]

## 2022-11-30 NOTE — PROGRESS NOTE ADULT - SUBJECTIVE AND OBJECTIVE BOX
Jonel Kwan MD  Division of Hospital Medicine  Available on MS teams until 7pm  If no response or off-hours, page [phone]  -------------------------------------    Patient is a 42y old  Male who presents with a chief complaint of fevers and chills (30 Nov 2022 14:23)    SUBJECTIVE / OVERNIGHT EVENTS: none acute  ADDITIONAL REVIEW OF SYSTEMS: pt just hungry from being NPO but otherwise no new complaints, feels ok    MEDICATIONS  (STANDING):    MEDICATIONS  (PRN):      CAPILLARY BLOOD GLUCOSE      POCT Blood Glucose.: 166 mg/dL (30 Nov 2022 13:11)    I&O's Summary    29 Nov 2022 07:01  -  30 Nov 2022 07:00  --------------------------------------------------------  IN: 1480 mL / OUT: 2500 mL / NET: -1020 mL    30 Nov 2022 07:01  -  30 Nov 2022 16:59  --------------------------------------------------------  IN: 0 mL / OUT: 2400 mL / NET: -2400 mL        PHYSICAL EXAM:  Vital Signs Last 24 Hrs  T(C): 36.2 (30 Nov 2022 16:13), Max: 37.7 (30 Nov 2022 01:05)  T(F): 97.2 (30 Nov 2022 14:30), Max: 99.8 (30 Nov 2022 01:05)  HR: 85 (30 Nov 2022 16:13) (83 - 98)  BP: 140/55 (30 Nov 2022 16:13) (124/74 - 164/95)  BP(mean): 103 (30 Nov 2022 16:13) (103 - 103)  RR: 18 (30 Nov 2022 16:13) (18 - 20)  SpO2: 100% (30 Nov 2022 16:13) (97% - 100%)    Parameters below as of 30 Nov 2022 16:13    O2 Flow (L/min): 3    CONSTITUTIONAL: NAD  EYES: PERRLA; conjunctiva and sclera clear  ENMT: MMM  NECK: Supple  RESPIRATORY: Normal respiratory effort; CTAB  CARDIOVASCULAR: RRR, no JVD, no peripheral edema, L avf explantation site bandaged  ABDOMEN: Nontender to palpation, normoactive BS, no guarding/rigidity  MUSCLOSKELETAL:  no clubbing/cyanosis, no joint swelling or tenderness to palpation  PSYCH: A+O x 3, affect normal  NEUROLOGY: CN 2-12 are intact and symmetric; no gross sensory or motor deficits  SKIN: No rashes; no palpable lesions    LABS:                        7.5    11.84 )-----------( 349      ( 30 Nov 2022 11:46 )             24.8     11-30    129<L>  |  90<L>  |  55<H>  ----------------------------<  129<H>  5.4<H>   |  21<L>  |  8.31<H>    Ca    9.4      30 Nov 2022 07:44  Phos  6.7     11-30  Mg     2.2     11-30    TPro  8.1  /  Alb  3.3  /  TBili  0.4  /  DBili  x   /  AST  10  /  ALT  <5<L>  /  AlkPhos  270<H>  11-29    PT/INR - ( 29 Nov 2022 10:42 )   PT: 13.6 sec;   INR: 1.17 ratio                     RADIOLOGY & ADDITIONAL TESTS:  Results Reviewed:   Imaging Personally Reviewed:  Electrocardiogram Personally Reviewed:    COORDINATION OF CARE:  Care Discussed with Consultants/Other Providers [Y/N]: floor np  Prior or Outpatient Records Reviewed [Y/N]:

## 2022-11-30 NOTE — PROGRESS NOTE ADULT - SUBJECTIVE AND OBJECTIVE BOX
Patient seen and examined  no complaints    aspirin (Swelling)  fish (Unknown)  penicillins (Swelling)  shellfish (Rash)    Hospital Medications:   MEDICATIONS  (STANDING):  benzonatate 100 milliGRAM(s) Oral every 8 hours  ceFAZolin   IVPB 1000 milliGRAM(s) IV Intermittent every 24 hours  chlorhexidine 4% Liquid 1 Application(s) Topical <User Schedule>  epoetin cyndie-epbx (RETACRIT) Injectable 15729 Unit(s) IV Push <User Schedule>  heparin   Injectable 5000 Unit(s) SubCutaneous every 8 hours  lidocaine   4% Patch 1 Patch Transdermal once  mirtazapine 15 milliGRAM(s) Oral at bedtime  PARoxetine 20 milliGRAM(s) Oral daily  QUEtiapine 300 milliGRAM(s) Oral at bedtime  risperiDONE   Tablet 2 milliGRAM(s) Oral daily  sevelamer carbonate 800 milliGRAM(s) Oral three times a day with meals  simvastatin 40 milliGRAM(s) Oral at bedtime        VITALS:  T(F): 98.2 (11-30-22 @ 10:02), Max: 99.8 (11-30-22 @ 01:05)  HR: 88 (11-30-22 @ 10:02)  BP: 137/77 (11-30-22 @ 10:02)  RR: 20 (11-30-22 @ 10:02)  SpO2: 100% (11-30-22 @ 10:02)  Wt(kg): --    11-29 @ 07:01  -  11-30 @ 07:00  --------------------------------------------------------  IN: 1480 mL / OUT: 2500 mL / NET: -1020 mL    11-30 @ 07:01  -  11-30 @ 14:23  --------------------------------------------------------  IN: 0 mL / OUT: 0 mL / NET: 0 mL  Physical Exam :-  Constitutional: NAD  Neck: Supple.  Respiratory: Bilateral equal breath sounds,  Cardiovascular: S1, S2 normal,  Gastrointestinal: Bowel Sounds present, soft, non tender.  Extremities: +edema, LUE clean dressing  Neurological: Alert and Oriented x 3, no focal deficits  Psychiatric: Normal mood, normal affect  LABS:  11-30    129<L>  |  90<L>  |  55<H>  ----------------------------<  129<H>  5.4<H>   |  21<L>  |  8.31<H>    Ca    9.4      30 Nov 2022 07:44  Phos  6.7     11-30  Mg     2.2     11-30    TPro  8.1  /  Alb  3.3  /  TBili  0.4  /  DBili      /  AST  10  /  ALT  <5<L>  /  AlkPhos  270<H>  11-29    Creatinine Trend: 8.31 <--, 8.97 <--, 10.51 <--, 8.46 <--, 9.64 <--, 12.11 <--, 10.55 <--                        7.5    11.84 )-----------( 349      ( 30 Nov 2022 11:46 )             24.8     Urine Studies:      RADIOLOGY & ADDITIONAL STUDIES:

## 2022-11-30 NOTE — PROGRESS NOTE ADULT - PROBLEM SELECTOR PLAN 3
Patient complete dialysis at home M/Tue/off Wed/Thur/ off Fri/ Sat/ Sun. AVF in place (prior hx of aneurysm per [facility] records)  - currently getting dialysis through [facility]  - Will need permacath placement when cleared by ID  -HD per nephro

## 2022-11-30 NOTE — PACU DISCHARGE NOTE - COMMENTS
pulse oximetry with 2 liters nasal cannula
Stable for discharge from PACU and transfer to floor w/ continuous pulse oximetry on 4L O2 via NC
no chills

## 2022-11-30 NOTE — PROGRESS NOTE ADULT - ASSESSMENT
42-year-old male with past medical history ESRD on HD (at home, M to F via AV graft), COPD on home oxygen 4L, bipolar disorder, schizophrenia presented to [place] fever and chills x1 day. Transferred to [facility] for continued MSSA bacteremia management and further imaging, now s/p LUE AV graft excision 11/23 now pending ALBINO

## 2022-12-01 LAB
ANION GAP SERPL CALC-SCNC: 21 MMOL/L — HIGH (ref 5–17)
BUN SERPL-MCNC: 58 MG/DL — HIGH (ref 7–23)
CALCIUM SERPL-MCNC: 10.2 MG/DL — SIGNIFICANT CHANGE UP (ref 8.4–10.5)
CHLORIDE SERPL-SCNC: 85 MMOL/L — LOW (ref 96–108)
CO2 SERPL-SCNC: 22 MMOL/L — SIGNIFICANT CHANGE UP (ref 22–31)
CREAT SERPL-MCNC: 8.35 MG/DL — HIGH (ref 0.5–1.3)
EGFR: 8 ML/MIN/1.73M2 — LOW
GLUCOSE SERPL-MCNC: 209 MG/DL — HIGH (ref 70–99)
HCT VFR BLD CALC: 28.1 % — LOW (ref 39–50)
HGB BLD-MCNC: 8.6 G/DL — LOW (ref 13–17)
MCHC RBC-ENTMCNC: 30.6 GM/DL — LOW (ref 32–36)
MCHC RBC-ENTMCNC: 31.2 PG — SIGNIFICANT CHANGE UP (ref 27–34)
MCV RBC AUTO: 101.8 FL — HIGH (ref 80–100)
NRBC # BLD: 0 /100 WBCS — SIGNIFICANT CHANGE UP (ref 0–0)
PLATELET # BLD AUTO: 355 K/UL — SIGNIFICANT CHANGE UP (ref 150–400)
POTASSIUM SERPL-MCNC: 4.4 MMOL/L — SIGNIFICANT CHANGE UP (ref 3.5–5.3)
POTASSIUM SERPL-SCNC: 4.4 MMOL/L — SIGNIFICANT CHANGE UP (ref 3.5–5.3)
RBC # BLD: 2.76 M/UL — LOW (ref 4.2–5.8)
RBC # FLD: 15.6 % — HIGH (ref 10.3–14.5)
SODIUM SERPL-SCNC: 128 MMOL/L — LOW (ref 135–145)
WBC # BLD: 10.55 K/UL — HIGH (ref 3.8–10.5)
WBC # FLD AUTO: 10.55 K/UL — HIGH (ref 3.8–10.5)

## 2022-12-01 PROCEDURE — 99233 SBSQ HOSP IP/OBS HIGH 50: CPT

## 2022-12-01 PROCEDURE — 99232 SBSQ HOSP IP/OBS MODERATE 35: CPT

## 2022-12-01 RX ORDER — QUETIAPINE FUMARATE 200 MG/1
300 TABLET, FILM COATED ORAL AT BEDTIME
Refills: 0 | Status: DISCONTINUED | OUTPATIENT
Start: 2022-12-01 | End: 2022-12-09

## 2022-12-01 RX ORDER — ONDANSETRON 8 MG/1
4 TABLET, FILM COATED ORAL ONCE
Refills: 0 | Status: COMPLETED | OUTPATIENT
Start: 2022-12-01 | End: 2022-12-01

## 2022-12-01 RX ORDER — RISPERIDONE 4 MG/1
2 TABLET ORAL DAILY
Refills: 0 | Status: DISCONTINUED | OUTPATIENT
Start: 2022-12-01 | End: 2022-12-09

## 2022-12-01 RX ORDER — SENNA PLUS 8.6 MG/1
2 TABLET ORAL AT BEDTIME
Refills: 0 | Status: DISCONTINUED | OUTPATIENT
Start: 2022-12-01 | End: 2022-12-09

## 2022-12-01 RX ORDER — HYDROMORPHONE HYDROCHLORIDE 2 MG/ML
1 INJECTION INTRAMUSCULAR; INTRAVENOUS; SUBCUTANEOUS EVERY 4 HOURS
Refills: 0 | Status: DISCONTINUED | OUTPATIENT
Start: 2022-12-01 | End: 2022-12-07

## 2022-12-01 RX ORDER — SIMVASTATIN 20 MG/1
40 TABLET, FILM COATED ORAL AT BEDTIME
Refills: 0 | Status: DISCONTINUED | OUTPATIENT
Start: 2022-12-01 | End: 2022-12-09

## 2022-12-01 RX ORDER — ERYTHROPOIETIN 10000 [IU]/ML
20000 INJECTION, SOLUTION INTRAVENOUS; SUBCUTANEOUS
Refills: 0 | Status: DISCONTINUED | OUTPATIENT
Start: 2022-12-01 | End: 2022-12-09

## 2022-12-01 RX ORDER — POLYETHYLENE GLYCOL 3350 17 G/17G
17 POWDER, FOR SOLUTION ORAL
Refills: 0 | Status: DISCONTINUED | OUTPATIENT
Start: 2022-12-01 | End: 2022-12-09

## 2022-12-01 RX ORDER — SEVELAMER CARBONATE 2400 MG/1
800 POWDER, FOR SUSPENSION ORAL
Refills: 0 | Status: DISCONTINUED | OUTPATIENT
Start: 2022-12-01 | End: 2022-12-09

## 2022-12-01 RX ORDER — LIDOCAINE 4 G/100G
1 CREAM TOPICAL ONCE
Refills: 0 | Status: COMPLETED | OUTPATIENT
Start: 2022-12-01 | End: 2022-12-01

## 2022-12-01 RX ADMIN — HYDROMORPHONE HYDROCHLORIDE 4 MILLIGRAM(S): 2 INJECTION INTRAMUSCULAR; INTRAVENOUS; SUBCUTANEOUS at 04:00

## 2022-12-01 RX ADMIN — ONDANSETRON 4 MILLIGRAM(S): 8 TABLET, FILM COATED ORAL at 13:33

## 2022-12-01 RX ADMIN — HYDROMORPHONE HYDROCHLORIDE 1 MILLIGRAM(S): 2 INJECTION INTRAMUSCULAR; INTRAVENOUS; SUBCUTANEOUS at 09:35

## 2022-12-01 RX ADMIN — HYDROMORPHONE HYDROCHLORIDE 4 MILLIGRAM(S): 2 INJECTION INTRAMUSCULAR; INTRAVENOUS; SUBCUTANEOUS at 03:14

## 2022-12-01 RX ADMIN — HYDROMORPHONE HYDROCHLORIDE 1 MILLIGRAM(S): 2 INJECTION INTRAMUSCULAR; INTRAVENOUS; SUBCUTANEOUS at 14:03

## 2022-12-01 RX ADMIN — MIRTAZAPINE 15 MILLIGRAM(S): 45 TABLET, ORALLY DISINTEGRATING ORAL at 22:52

## 2022-12-01 RX ADMIN — QUETIAPINE FUMARATE 300 MILLIGRAM(S): 200 TABLET, FILM COATED ORAL at 22:52

## 2022-12-01 RX ADMIN — HEPARIN SODIUM 5000 UNIT(S): 5000 INJECTION INTRAVENOUS; SUBCUTANEOUS at 06:29

## 2022-12-01 RX ADMIN — HYDROMORPHONE HYDROCHLORIDE 1 MILLIGRAM(S): 2 INJECTION INTRAMUSCULAR; INTRAVENOUS; SUBCUTANEOUS at 13:33

## 2022-12-01 RX ADMIN — HEPARIN SODIUM 5000 UNIT(S): 5000 INJECTION INTRAVENOUS; SUBCUTANEOUS at 22:52

## 2022-12-01 RX ADMIN — LIDOCAINE 1 PATCH: 4 CREAM TOPICAL at 21:30

## 2022-12-01 RX ADMIN — LIDOCAINE 1 PATCH: 4 CREAM TOPICAL at 20:39

## 2022-12-01 RX ADMIN — HYDROMORPHONE HYDROCHLORIDE 1 MILLIGRAM(S): 2 INJECTION INTRAMUSCULAR; INTRAVENOUS; SUBCUTANEOUS at 09:05

## 2022-12-01 RX ADMIN — HYDROMORPHONE HYDROCHLORIDE 1 MILLIGRAM(S): 2 INJECTION INTRAMUSCULAR; INTRAVENOUS; SUBCUTANEOUS at 00:00

## 2022-12-01 RX ADMIN — SIMVASTATIN 40 MILLIGRAM(S): 20 TABLET, FILM COATED ORAL at 22:52

## 2022-12-01 RX ADMIN — Medication 100 MILLIGRAM(S): at 22:52

## 2022-12-01 RX ADMIN — HYDROMORPHONE HYDROCHLORIDE 1 MILLIGRAM(S): 2 INJECTION INTRAMUSCULAR; INTRAVENOUS; SUBCUTANEOUS at 23:20

## 2022-12-01 RX ADMIN — Medication 600 MILLIGRAM(S): at 06:28

## 2022-12-01 RX ADMIN — SEVELAMER CARBONATE 800 MILLIGRAM(S): 2400 POWDER, FOR SUSPENSION ORAL at 09:05

## 2022-12-01 RX ADMIN — CHLORHEXIDINE GLUCONATE 1 APPLICATION(S): 213 SOLUTION TOPICAL at 13:32

## 2022-12-01 RX ADMIN — HYDROMORPHONE HYDROCHLORIDE 1 MILLIGRAM(S): 2 INJECTION INTRAMUSCULAR; INTRAVENOUS; SUBCUTANEOUS at 23:05

## 2022-12-01 RX ADMIN — LIDOCAINE 1 PATCH: 4 CREAM TOPICAL at 09:47

## 2022-12-01 NOTE — DIETITIAN INITIAL EVALUATION ADULT - NS FNS DIET ORDER
Diet, Regular:   For patients receiving Renal Replacement - No Protein Restr, No Conc K, No Conc Phos, Low Sodium (RENAL) (11-30-22 @ 19:35)

## 2022-12-01 NOTE — DIETITIAN INITIAL EVALUATION ADULT - REASON FOR ADMISSION
Per chart: "42-year-old male with past medical history ESRD on HD (at home, M to F via AV graft), COPD on home oxygen 4L, bipolar disorder, schizophrenia presented to [place] fever and chills x1 day. Transferred to [facility] for continued MSSA bacteremia management and further imaging, now s/p LUE AV graft excision 11/23 now pending ALBINO"

## 2022-12-01 NOTE — DIETITIAN INITIAL EVALUATION ADULT - PERTINENT MEDS FT
cefazolin (antibiotic), paroxetine, sevelamer carbonate, simvastatin, benzonatate, heparin, retacrit dialysis MWF

## 2022-12-01 NOTE — PROGRESS NOTE ADULT - ASSESSMENT
42M BMI 48, COPD on home oxygen, hypertensive ESRD, bipolar, schizophrenia.   Here 11/19 with sepsis, high grade MSSA bacteremia.   From left arm AV graft, excised 11/23.   Clearing 11/27 and ALBINO without vegetations but CT suggested pulmonary septic emboli.   Stable., afebrile, leukocytosis downtrending.     Suggest  -monitor blood cultures   -if blood cultures remain negative without clinical change, can place permacath tomorrow 12/2   -Ancef 1GM IV q24h - for discharge dose three times weekly post HD 2GM, 2GM, 3GM through 1/8/23 for 6 weeks   -outpatient follow up with me     Discussed with medicine   I will be rotating to [facility]. ID service will follow.     Keven Eduardo MD   Infectious Disease   Available on TEAMS. After 5PM and on weekends please page fellow on call or call [phone]

## 2022-12-01 NOTE — PROGRESS NOTE ADULT - SUBJECTIVE AND OBJECTIVE BOX
Follow Up:     Interval History/ROS:     Allergies  aspirin (Swelling)  fish (Unknown)  penicillins (Swelling)  shellfish (Rash)        ANTIMICROBIALS:  ceFAZolin   IVPB 1000 every 24 hours      OTHER MEDS:  benzonatate 100 milliGRAM(s) Oral every 8 hours PRN  chlorhexidine 4% Liquid 1 Application(s) Topical daily  epoetin cyndie-epbx (RETACRIT) Injectable 31545 Unit(s) IV Push <User Schedule>  guaiFENesin  milliGRAM(s) Oral every 12 hours  heparin   Injectable 5000 Unit(s) SubCutaneous every 8 hours  HYDROmorphone  Injectable 1 milliGRAM(s) IV Push every 4 hours PRN  mirtazapine 15 milliGRAM(s) Oral at bedtime  PARoxetine 20 milliGRAM(s) Oral daily  polyethylene glycol 3350 17 Gram(s) Oral two times a day  QUEtiapine 300 milliGRAM(s) Oral at bedtime  risperiDONE   Tablet 2 milliGRAM(s) Oral daily  senna 2 Tablet(s) Oral at bedtime  sevelamer carbonate 800 milliGRAM(s) Oral three times a day with meals  simvastatin 40 milliGRAM(s) Oral at bedtime      Vital Signs Last 24 Hrs  T(C): 36.8 (01 Dec 2022 14:16), Max: 37.9 (01 Dec 2022 01:16)  T(F): 98.2 (01 Dec 2022 14:16), Max: 100.2 (01 Dec 2022 01:16)  HR: 94 (01 Dec 2022 14:16) (63 - 94)  BP: 108/65 (01 Dec 2022 14:16) (95/50 - 147/84)  BP(mean): 80 (30 Nov 2022 18:45) (68 - 103)  RR: 18 (01 Dec 2022 14:16) (16 - 18)  SpO2: 97% (01 Dec 2022 14:16) (94% - 100%)    Parameters below as of 01 Dec 2022 14:16  Patient On (Oxygen Delivery Method): nasal cannula  O2 Flow (L/min): 2      Physical Exam:  General: non toxic  Cardio: regular rate   Respiratory: nonlabored   abd: nondistended  Musculoskeletal: no focal joint swelling, no edema  vascular: no phlebitis   Skin: no rash                          8.6    10.55 )-----------( 355      ( 01 Dec 2022 10:33 )             28.1       12-01    128<L>  |  85<L>  |  58<H>  ----------------------------<  209<H>  4.4   |  22  |  8.35<H>    Ca    10.2      01 Dec 2022 10:33  Phos  6.7     11-30  Mg     2.2     11-30            MICROBIOLOGY:  Culture - Blood (collected 11-29-22 @ 21:12)  Source: .Blood Blood-Venous  Preliminary Report (12-01-22 @ 02:03):    No growth to date.    Culture - Blood (collected 11-29-22 @ 21:12)  Source: .Blood Blood  Preliminary Report (12-01-22 @ 02:03):    No growth to date.    Culture - Blood (collected 11-27-22 @ 07:15)  Source: .Blood Blood-Venous  Preliminary Report (11-28-22 @ 10:02):    No growth to date.    Culture - Blood (collected 11-27-22 @ 07:15)  Source: .Blood Blood  Preliminary Report (11-28-22 @ 10:02):    No growth to date.      v    Rapid RVP Result: NotDetec (11-28 @ 18:10)      RADIOLOGY:  Images below reviewed personally   Follow Up: MSSA    Interval History/ROS: Afebrile. Pain is ok. No diarrhea.     Allergies  aspirin (Swelling)  fish (Unknown)  penicillins (Swelling)  shellfish (Rash)        ANTIMICROBIALS:  ceFAZolin   IVPB 1000 every 24 hours      OTHER MEDS:  benzonatate 100 milliGRAM(s) Oral every 8 hours PRN  chlorhexidine 4% Liquid 1 Application(s) Topical daily  epoetin cyndie-epbx (RETACRIT) Injectable 21093 Unit(s) IV Push <User Schedule>  guaiFENesin  milliGRAM(s) Oral every 12 hours  heparin   Injectable 5000 Unit(s) SubCutaneous every 8 hours  HYDROmorphone  Injectable 1 milliGRAM(s) IV Push every 4 hours PRN  mirtazapine 15 milliGRAM(s) Oral at bedtime  PARoxetine 20 milliGRAM(s) Oral daily  polyethylene glycol 3350 17 Gram(s) Oral two times a day  QUEtiapine 300 milliGRAM(s) Oral at bedtime  risperiDONE   Tablet 2 milliGRAM(s) Oral daily  senna 2 Tablet(s) Oral at bedtime  sevelamer carbonate 800 milliGRAM(s) Oral three times a day with meals  simvastatin 40 milliGRAM(s) Oral at bedtime      Vital Signs Last 24 Hrs  T(C): 36.8 (01 Dec 2022 14:16), Max: 37.9 (01 Dec 2022 01:16)  T(F): 98.2 (01 Dec 2022 14:16), Max: 100.2 (01 Dec 2022 01:16)  HR: 94 (01 Dec 2022 14:16) (63 - 94)  BP: 108/65 (01 Dec 2022 14:16) (95/50 - 147/84)  BP(mean): 80 (30 Nov 2022 18:45) (68 - 103)  RR: 18 (01 Dec 2022 14:16) (16 - 18)  SpO2: 97% (01 Dec 2022 14:16) (94% - 100%)    Parameters below as of 01 Dec 2022 14:16  Patient On (Oxygen Delivery Method): nasal cannula  O2 Flow (L/min): 2      Physical Exam:  General: non toxic, obese  Cardio: regular rate   Respiratory: nonlabored on nasal cannula   abd: nondistended  Musculoskeletal: left arm woundvac   vascular: right IJ CVC, no phlebitis   Skin: no rash                          8.6    10.55 )-----------( 355      ( 01 Dec 2022 10:33 )             28.1       12-01    128<L>  |  85<L>  |  58<H>  ----------------------------<  209<H>  4.4   |  22  |  8.35<H>    Ca    10.2      01 Dec 2022 10:33  Phos  6.7     11-30  Mg     2.2     11-30            MICROBIOLOGY:  Culture - Blood (collected 11-29-22 @ 21:12)  Source: .Blood Blood-Venous  Preliminary Report (12-01-22 @ 02:03):    No growth to date.    Culture - Blood (collected 11-29-22 @ 21:12)  Source: .Blood Blood  Preliminary Report (12-01-22 @ 02:03):    No growth to date.    Culture - Blood (collected 11-27-22 @ 07:15)  Source: .Blood Blood-Venous  Preliminary Report (11-28-22 @ 10:02):    No growth to date.    Culture - Blood (collected 11-27-22 @ 07:15)  Source: .Blood Blood  Preliminary Report (11-28-22 @ 10:02):    No growth to date.    Rapid RVP Result: NotDetec (11-28 @ 18:10)      RADIOLOGY:  Images below reviewed personally  VA Duplex Upper Ext Vein Scan, Bilat (11.28.22 @ 17:39)   No evidence of deep venous thrombosis in either upper extremity.  Limited study, nonvisualization of multiple venous segments as above    VA Duplex Lower Ext Vein Scan, Bilat (11.28.22 @ 17:39)   No evidence of deep venous thrombosis in either lower extremity.    Xray Chest 1 View- PORTABLE-Urgent (Xray Chest 1 View- PORTABLE-Urgent .) (11.27.22 @ 23:41)   Interval improvement of pulmonary edema.

## 2022-12-01 NOTE — DIETITIAN INITIAL EVALUATION ADULT - ENERGY INTAKE
Fair (50-75%) Patient reports decreased appetite and PO intake since admit due to knee pain, states appetite has been better in mornings; reports liking hospital food, specifically oatmeal; patient requests more protein with meals; patient preferences noted and relayed to diet office.

## 2022-12-01 NOTE — DIETITIAN INITIAL EVALUATION ADULT - PERSON TAUGHT/METHOD
Discussed renal diet low Na, K, and Phos; emphasized protein intake; patient verbalized willingness to change, accepted written materials about low K and low Phos foods./verbal instruction/written material/patient instructed/support person Discussed renal diet low Na, K, and Phos; emphasized protein intake; discussed appropriate drinks with patient; patient verbalized willingness to make dietary changes at home, accepted written materials about low K and low Phos foods./verbal instruction/written material/patient instructed/support person Discussed renal diet low Na, K, and Phos; emphasized protein intake; discussed appropriate drinks with patient; patient verbalized willingness to make dietary changes at home, accepted written materials about low K and low Phos foods./verbal instruction/written material/patient instructed

## 2022-12-01 NOTE — DIETITIAN INITIAL EVALUATION ADULT - ORAL INTAKE PTA/DIET HISTORY
Patient reports normal appetite and PO intake PTA; allergic to shellfish: throat closes, avoid other fish due to uncertainty of allergic reaction; no specific diet at home, eats scrambled eggs with turkey felipe, sesame seed bagels with jelly, oatmeal; avoids salt at home; likes strawberries, blueberries, grapes, does not like melon, cantaloupe, bananas.

## 2022-12-01 NOTE — DIETITIAN INITIAL EVALUATION ADULT - ADD RECOMMEND
1) Continue diet as ordered/tolerated: Renal  2) RD to remains available for education in setting of prediabetes  3) Continue to monitor PO intake, diet weight, labs, skin, BM and GI symptoms 1) Continue diet as ordered/tolerated: Renal  2) RD to remains available for education in setting of prediabetes  3) Continue to monitor PO intake, diet weight, labs, skin, BM and GI symptoms  4) RD to add double protein at breakfast to meet nutrient needs.

## 2022-12-01 NOTE — PROGRESS NOTE ADULT - ASSESSMENT
42-year-old male with past medical history ESRD on HD (at home, M to F via AV graft), COPD on home oxygen 4L, bipolar disorder, schizophrenia presented to [place] fever and chills x1 day. Transferred to [facility] for continued MSSA bacteremia management and further imaging, now s/p LUE AV graft excision 11/23 now s/p ALBINO without veggies, awaiting cleared blood cultures due 12/2 for permacath placement and dispo to [facility] with  HD

## 2022-12-01 NOTE — PROGRESS NOTE ADULT - ASSESSMENT
42-year-old male with past medical history ESRD on HD (at home, M to F via AV graft), COPD on home oxygen 4L, bipolar disorder, schizophrenia presents with fever and chills x1 day. Patient states he had a fever of up to 104 at home and in [place] ED was Tmax 100.6    ESRD on HD  Infected access, MSSA bacteremia, Septic Pulm emboli  S/P AVG excision 11/23/22  On Cefazolin per Infectious disease specialist  Has right IJ SHiley-->Will need ID clearance for Permacath Placement  WOuld prefer an AVF placed by Vascular prior to discharge  [facility] HD [facility] at [facility]  s/p HD yesterday- tolerated well  Plan for next HD tomm  Renal diet  Daily BMP    HTN  UF on HD  resume home BP med regimen  overall bp better    Anemia of CKD  Epo during HD  20k tiw with hd  trend hgb    BMD of CKD  Hyperphosphatemia- phos is 6.7  renvela with meals  trend etta phos    Hyponatremia  likely volume related  UF on HD  trend na      Dr Mauricio  [phone]

## 2022-12-01 NOTE — PROGRESS NOTE ADULT - SUBJECTIVE AND OBJECTIVE BOX
Patient seen and examined  no complaints      aspirin (Swelling)  fish (Unknown)  penicillins (Swelling)  shellfish (Rash)    Hospital Medications:   MEDICATIONS  (STANDING):  ceFAZolin   IVPB 1000 milliGRAM(s) IV Intermittent every 24 hours  chlorhexidine 4% Liquid 1 Application(s) Topical daily  epoetin cyndie-epbx (RETACRIT) Injectable 77146 Unit(s) IV Push <User Schedule>  guaiFENesin  milliGRAM(s) Oral every 12 hours  heparin   Injectable 5000 Unit(s) SubCutaneous every 8 hours  mirtazapine 15 milliGRAM(s) Oral at bedtime  PARoxetine 20 milliGRAM(s) Oral daily  polyethylene glycol 3350 17 Gram(s) Oral two times a day  QUEtiapine 300 milliGRAM(s) Oral at bedtime  risperiDONE   Tablet 2 milliGRAM(s) Oral daily  senna 2 Tablet(s) Oral at bedtime  sevelamer carbonate 800 milliGRAM(s) Oral three times a day with meals  simvastatin 40 milliGRAM(s) Oral at bedtime        VITALS:  T(F): 98.2 (12-01-22 @ 14:16), Max: 100.2 (12-01-22 @ 01:16)  HR: 94 (12-01-22 @ 14:16)  BP: 108/65 (12-01-22 @ 14:16)  RR: 18 (12-01-22 @ 14:16)  SpO2: 97% (12-01-22 @ 14:16)  Wt(kg): --    11-30 @ 07:01  -  12-01 @ 07:00  --------------------------------------------------------  IN: 480 mL / OUT: 2400 mL / NET: -1920 mL    12-01 @ 07:01  -  12-01 @ 15:22  --------------------------------------------------------  IN: 540 mL / OUT: 0 mL / NET: 540 mL      Height (cm): 200.7 (11-30 @ 16:13)  Weight (kg): 196.6 (11-30 @ 16:13)  BMI (kg/m2): 48.8 (11-30 @ 16:13)  BSA (m2): 3.17 (11-30 @ 16:13)  Physical Exam :-  Constitutional: NAD  Neck: Supple.  Respiratory: Bilateral equal breath sounds,  Cardiovascular: S1, S2 normal,  Gastrointestinal: Bowel Sounds present, soft, non tender.  Extremities: +edema, LUE clean dressing  Neurological: Alert and Oriented x 3, no focal deficits  Psychiatric: Normal mood, normal affect  LABS::    LABS:  12-01    128<L>  |  85<L>  |  58<H>  ----------------------------<  209<H>  4.4   |  22  |  8.35<H>    Ca    10.2      01 Dec 2022 10:33  Phos  6.7     11-30  Mg     2.2     11-30      Creatinine Trend: 8.35 <--, 8.31 <--, 8.97 <--, 10.51 <--, 8.46 <--, 9.64 <--, 12.11 <--                        8.6    10.55 )-----------( 355      ( 01 Dec 2022 10:33 )             28.1     Urine Studies:      RADIOLOGY & ADDITIONAL STUDIES:

## 2022-12-01 NOTE — PROGRESS NOTE ADULT - SUBJECTIVE AND OBJECTIVE BOX
Jonel Kwan MD  Division of Hospital Medicine  Available on MS teams until 7pm  If no response or off-hours, page [phone]  -------------------------------------    Patient is a 42y old  Male who presents with a chief complaint of fevers and chills (01 Dec 2022 15:22)      SUBJECTIVE / OVERNIGHT EVENTS: none acute  ADDITIONAL REVIEW OF SYSTEMS: pt notes L knee pain, requests orthopedics consult. No fevers/chills, no sob/cough. ros otherwise negative.     MEDICATIONS  (STANDING):  ceFAZolin   IVPB 1000 milliGRAM(s) IV Intermittent every 24 hours  chlorhexidine 4% Liquid 1 Application(s) Topical daily  epoetin cyndie-epbx (RETACRIT) Injectable 85464 Unit(s) IV Push <User Schedule>  guaiFENesin  milliGRAM(s) Oral every 12 hours  heparin   Injectable 5000 Unit(s) SubCutaneous every 8 hours  mirtazapine 15 milliGRAM(s) Oral at bedtime  PARoxetine 20 milliGRAM(s) Oral daily  polyethylene glycol 3350 17 Gram(s) Oral two times a day  QUEtiapine 300 milliGRAM(s) Oral at bedtime  risperiDONE   Tablet 2 milliGRAM(s) Oral daily  senna 2 Tablet(s) Oral at bedtime  sevelamer carbonate 800 milliGRAM(s) Oral three times a day with meals  simvastatin 40 milliGRAM(s) Oral at bedtime    MEDICATIONS  (PRN):  benzonatate 100 milliGRAM(s) Oral every 8 hours PRN Cough  HYDROmorphone  Injectable 1 milliGRAM(s) IV Push every 4 hours PRN Severe Pain (7 - 10)      CAPILLARY BLOOD GLUCOSE        I&O's Summary    30 Nov 2022 07:01  -  01 Dec 2022 07:00  --------------------------------------------------------  IN: 480 mL / OUT: 2400 mL / NET: -1920 mL    01 Dec 2022 07:01  -  01 Dec 2022 17:42  --------------------------------------------------------  IN: 540 mL / OUT: 0 mL / NET: 540 mL        PHYSICAL EXAM:  Vital Signs Last 24 Hrs  T(C): 36.7 (01 Dec 2022 17:24), Max: 37.9 (01 Dec 2022 01:16)  T(F): 98 (01 Dec 2022 17:24), Max: 100.2 (01 Dec 2022 01:16)  HR: 74 (01 Dec 2022 17:24) (63 - 94)  BP: 129/74 (01 Dec 2022 17:24) (95/50 - 147/84)  BP(mean): 80 (30 Nov 2022 18:45) (68 - 80)  RR: 18 (01 Dec 2022 17:24) (16 - 18)  SpO2: 97% (01 Dec 2022 17:24) (94% - 100%)    Parameters below as of 01 Dec 2022 17:24  Patient On (Oxygen Delivery Method): nasal cannula  O2 Flow (L/min): 2    CONSTITUTIONAL: NAD, obese  EYES: PERRLA; conjunctiva and sclera clear  ENMT: MMM  NECK: Supple  RESPIRATORY: Normal respiratory effort; CTAB  CARDIOVASCULAR: RRR, no JVD, no peripheral edema, +R shiley, L avf site bandaged  ABDOMEN: Nontender to palpation, normoactive BS, no guarding/rigidity  MUSCLOSKELETAL:  no clubbing/cyanosis, no joint swelling or tenderness to palpation  PSYCH: A+O x 3, affect normal  NEUROLOGY: CN 2-12 are intact and symmetric; no gross sensory or motor deficits  SKIN: No rashes; no palpable lesions    LABS:                        8.6    10.55 )-----------( 355      ( 01 Dec 2022 10:33 )             28.1     12-01    128<L>  |  85<L>  |  58<H>  ----------------------------<  209<H>  4.4   |  22  |  8.35<H>    Ca    10.2      01 Dec 2022 10:33  Phos  6.7     11-30  Mg     2.2     11-30                Culture - Blood (collected 29 Nov 2022 21:12)  Source: .Blood Blood-Venous  Preliminary Report (01 Dec 2022 02:03):    No growth to date.    Culture - Blood (collected 29 Nov 2022 21:12)  Source: .Blood Blood  Preliminary Report (01 Dec 2022 02:03):    No growth to date.        RADIOLOGY & ADDITIONAL TESTS:  Results Reviewed:   Imaging Personally Reviewed:  Electrocardiogram Personally Reviewed:    COORDINATION OF CARE:  Care Discussed with Consultants/Other Providers [Y/N]: floor np, nephrology, IR, ID.  Prior or Outpatient Records Reviewed [Y/N]:

## 2022-12-01 NOTE — CHART NOTE - NSCHARTNOTEFT_GEN_A_CORE
Patient to get permacath prior to discharge. Patient may follow up outpatient with vascular surgery for creation of AVF.     Vascular Surgery [id number] Patient to get permacath prior to discharge. Patient may follow up outpatient with vascular surgery for creation of AVF. Vascular surgery will continue to follow during patient's hospitalization.    Vascular Surgery [id number]

## 2022-12-01 NOTE — PROGRESS NOTE ADULT - PROBLEM SELECTOR PLAN 3
Patient complete dialysis at home M/Tue/off Wed/Thur/ off Fri/ Sat/ Sun. AVF in place (prior hx of aneurysm per [facility] records)  - currently getting dialysis through [facility]  - Will need permacath placement when cleared by ID  -HD per nephro

## 2022-12-01 NOTE — PROGRESS NOTE ADULT - PROBLEM SELECTOR PLAN 11
- Diet: renal   - DVT: heparin subQ  - PT    dispo: likely [facility] with HD pending permacath placement, pain control- anticipate this weekend vs. monday

## 2022-12-01 NOTE — DIETITIAN INITIAL EVALUATION ADULT - PERTINENT LABORATORY DATA
12-01  Na 128mmol/L (low)  K 4.4mmol/L (normal)  BUN 58mg/dL (high)  Cr 8.35mg/dL (high)  Serum glucose 209 (high)  eGFR 8 (low)  POCT Blood Glucose.: 166 mg/dL (11-30-22)  A1C with Estimated Average Glucose Result: 5.9 % (11-23-22)

## 2022-12-01 NOTE — CHART NOTE - NSCHARTNOTEFT_GEN_A_CORE
Called  by  RN  to  evaluate  pt  for  left  knee   pain  .  Pt  was  seen and  evaluated at  the  bedside . pt  states  he  was  supposed  to  see  orthopedic  at  last  admission  for  the  pain  in  his  left  knee  but  was  unable  to  go  due  to  difficulty  to  ambulate.  Pt  states   the  pain  is   sharp  and  cramping  at  the  left  knee,  using  numeric  scale  pain  was  a  9/10.  Pt  Left  knee  was  examined,  no  s/s  of  infection or  swelling  noted  to  the  area.  Left  knee  is  cool   to  the  touch  and  pt  is  able  to  perform  range  of motion.    Pt  was  given  additional  dilaudid   to  relieved  the  pain  and  lidocaine  patch  to  the  affected  area.    If  pain  should  persist  will  consider  Ortho  Consult.    F/U  with  the  primary team   in  the  am.      Karen Fierro

## 2022-12-01 NOTE — PROGRESS NOTE ADULT - PROBLEM SELECTOR PLAN 1
Found to have MSSA bacteremia 2/2 possible AVF infection d/t noted purulence. Transferred to [facility] for further imaging. S/p empiric treatment with vanc (11/19) and cefepime (11/19). Continued on ancef. Leukocytosis improved. LUE negative for findings of DVT.     -blood culture - MSSA, cultures 11/24 still positive, 11/27 prelim negative. Will need 6 week course of abx from negative culture, PICC line when closer to discharge   - c/w Ancef 11/20-  - s/p R IJ shiley placed 11/21   - CT Chest suggesting septic emboli to lungs  - CT angio of LUE suggests graft infections- s/p LUE graft excision on 11/23 now on wound vac   - TTE unchanged from 2019, ALBINO no vegetations  - noted gradually uptrending WBC, reactive vs infection, continue to monitor for clinical signs of infection  - permacath placement 12/2 if 11/27 blood cultures clear

## 2022-12-01 NOTE — DIETITIAN NUTRITION RISK NOTIFICATION - TREATMENT: THE FOLLOWING DIET HAS BEEN RECOMMENDED
Diet, Regular:   For patients receiving Renal Replacement - No Protein Restr, No Conc K, No Conc Phos, Low Sodium (RENAL) (11-30-22 @ 19:35) [Active]

## 2022-12-02 LAB
ANION GAP SERPL CALC-SCNC: 20 MMOL/L — HIGH (ref 5–17)
BUN SERPL-MCNC: 70 MG/DL — HIGH (ref 7–23)
CALCIUM SERPL-MCNC: 10.1 MG/DL — SIGNIFICANT CHANGE UP (ref 8.4–10.5)
CHLORIDE SERPL-SCNC: 85 MMOL/L — LOW (ref 96–108)
CO2 SERPL-SCNC: 23 MMOL/L — SIGNIFICANT CHANGE UP (ref 22–31)
CREAT SERPL-MCNC: 10.15 MG/DL — HIGH (ref 0.5–1.3)
CULTURE RESULTS: SIGNIFICANT CHANGE UP
CULTURE RESULTS: SIGNIFICANT CHANGE UP
EGFR: 6 ML/MIN/1.73M2 — LOW
GLUCOSE SERPL-MCNC: 124 MG/DL — HIGH (ref 70–99)
HCT VFR BLD CALC: 23.9 % — LOW (ref 39–50)
HGB BLD-MCNC: 7.5 G/DL — LOW (ref 13–17)
MCHC RBC-ENTMCNC: 31.1 PG — SIGNIFICANT CHANGE UP (ref 27–34)
MCHC RBC-ENTMCNC: 31.4 GM/DL — LOW (ref 32–36)
MCV RBC AUTO: 99.2 FL — SIGNIFICANT CHANGE UP (ref 80–100)
NRBC # BLD: 0 /100 WBCS — SIGNIFICANT CHANGE UP (ref 0–0)
PLATELET # BLD AUTO: 354 K/UL — SIGNIFICANT CHANGE UP (ref 150–400)
POTASSIUM SERPL-MCNC: 4.5 MMOL/L — SIGNIFICANT CHANGE UP (ref 3.5–5.3)
POTASSIUM SERPL-SCNC: 4.5 MMOL/L — SIGNIFICANT CHANGE UP (ref 3.5–5.3)
RBC # BLD: 2.41 M/UL — LOW (ref 4.2–5.8)
RBC # FLD: 15.3 % — HIGH (ref 10.3–14.5)
SODIUM SERPL-SCNC: 128 MMOL/L — LOW (ref 135–145)
SPECIMEN SOURCE: SIGNIFICANT CHANGE UP
SPECIMEN SOURCE: SIGNIFICANT CHANGE UP
WBC # BLD: 10.07 K/UL — SIGNIFICANT CHANGE UP (ref 3.8–10.5)
WBC # FLD AUTO: 10.07 K/UL — SIGNIFICANT CHANGE UP (ref 3.8–10.5)

## 2022-12-02 PROCEDURE — 36558 INSERT TUNNELED CV CATH: CPT

## 2022-12-02 PROCEDURE — 74018 RADEX ABDOMEN 1 VIEW: CPT | Mod: 26

## 2022-12-02 PROCEDURE — 99233 SBSQ HOSP IP/OBS HIGH 50: CPT

## 2022-12-02 PROCEDURE — 77001 FLUOROGUIDE FOR VEIN DEVICE: CPT | Mod: 26

## 2022-12-02 RX ORDER — ALTEPLASE 100 MG
2 KIT INTRAVENOUS ONCE
Refills: 0 | Status: COMPLETED | OUTPATIENT
Start: 2022-12-02 | End: 2022-12-02

## 2022-12-02 RX ORDER — ONDANSETRON 8 MG/1
4 TABLET, FILM COATED ORAL ONCE
Refills: 0 | Status: COMPLETED | OUTPATIENT
Start: 2022-12-02 | End: 2022-12-02

## 2022-12-02 RX ORDER — SODIUM CHLORIDE 9 MG/ML
10 INJECTION INTRAMUSCULAR; INTRAVENOUS; SUBCUTANEOUS
Refills: 0 | Status: DISCONTINUED | OUTPATIENT
Start: 2022-12-02 | End: 2022-12-09

## 2022-12-02 RX ADMIN — SEVELAMER CARBONATE 800 MILLIGRAM(S): 2400 POWDER, FOR SUSPENSION ORAL at 08:04

## 2022-12-02 RX ADMIN — HYDROMORPHONE HYDROCHLORIDE 1 MILLIGRAM(S): 2 INJECTION INTRAMUSCULAR; INTRAVENOUS; SUBCUTANEOUS at 12:38

## 2022-12-02 RX ADMIN — ONDANSETRON 4 MILLIGRAM(S): 8 TABLET, FILM COATED ORAL at 10:10

## 2022-12-02 RX ADMIN — HYDROMORPHONE HYDROCHLORIDE 1 MILLIGRAM(S): 2 INJECTION INTRAMUSCULAR; INTRAVENOUS; SUBCUTANEOUS at 22:35

## 2022-12-02 RX ADMIN — ERYTHROPOIETIN 20000 UNIT(S): 10000 INJECTION, SOLUTION INTRAVENOUS; SUBCUTANEOUS at 10:10

## 2022-12-02 RX ADMIN — CHLORHEXIDINE GLUCONATE 1 APPLICATION(S): 213 SOLUTION TOPICAL at 18:27

## 2022-12-02 RX ADMIN — HYDROMORPHONE HYDROCHLORIDE 1 MILLIGRAM(S): 2 INJECTION INTRAMUSCULAR; INTRAVENOUS; SUBCUTANEOUS at 22:18

## 2022-12-02 RX ADMIN — SEVELAMER CARBONATE 800 MILLIGRAM(S): 2400 POWDER, FOR SUSPENSION ORAL at 18:27

## 2022-12-02 RX ADMIN — MIRTAZAPINE 15 MILLIGRAM(S): 45 TABLET, ORALLY DISINTEGRATING ORAL at 22:19

## 2022-12-02 RX ADMIN — QUETIAPINE FUMARATE 300 MILLIGRAM(S): 200 TABLET, FILM COATED ORAL at 22:19

## 2022-12-02 RX ADMIN — Medication 600 MILLIGRAM(S): at 05:18

## 2022-12-02 RX ADMIN — RISPERIDONE 2 MILLIGRAM(S): 4 TABLET ORAL at 18:28

## 2022-12-02 RX ADMIN — Medication 20 MILLIGRAM(S): at 18:28

## 2022-12-02 RX ADMIN — ALTEPLASE 2 MILLIGRAM(S): KIT at 10:45

## 2022-12-02 RX ADMIN — Medication 600 MILLIGRAM(S): at 18:28

## 2022-12-02 RX ADMIN — HYDROMORPHONE HYDROCHLORIDE 1 MILLIGRAM(S): 2 INJECTION INTRAMUSCULAR; INTRAVENOUS; SUBCUTANEOUS at 08:34

## 2022-12-02 RX ADMIN — HYDROMORPHONE HYDROCHLORIDE 1 MILLIGRAM(S): 2 INJECTION INTRAMUSCULAR; INTRAVENOUS; SUBCUTANEOUS at 18:24

## 2022-12-02 RX ADMIN — Medication 100 MILLIGRAM(S): at 23:33

## 2022-12-02 RX ADMIN — HYDROMORPHONE HYDROCHLORIDE 1 MILLIGRAM(S): 2 INJECTION INTRAMUSCULAR; INTRAVENOUS; SUBCUTANEOUS at 08:04

## 2022-12-02 RX ADMIN — ONDANSETRON 4 MILLIGRAM(S): 8 TABLET, FILM COATED ORAL at 03:57

## 2022-12-02 RX ADMIN — SIMVASTATIN 40 MILLIGRAM(S): 20 TABLET, FILM COATED ORAL at 22:19

## 2022-12-02 RX ADMIN — HYDROMORPHONE HYDROCHLORIDE 1 MILLIGRAM(S): 2 INJECTION INTRAMUSCULAR; INTRAVENOUS; SUBCUTANEOUS at 18:55

## 2022-12-02 RX ADMIN — HEPARIN SODIUM 5000 UNIT(S): 5000 INJECTION INTRAVENOUS; SUBCUTANEOUS at 05:17

## 2022-12-02 NOTE — CHART NOTE - NSCHARTNOTEFT_GEN_A_CORE
Called by RN for patient with large volume projectile green vomiting x 1 this evening.  Pt seen and examined at bedside.  States he also had a large volume episode of vomiting last night as well. States that he has not had a BM since before being in the hospital. +passing flatus.  Denies abdominal pain. States he nausea has resolved since vomiting. States he had not eaten anything all day.    ICU Vital Signs Last 24 Hrs  T(C): 36.7 (02 Dec 2022 03:49), Max: 36.9 (01 Dec 2022 07:00)  T(F): 98.1 (02 Dec 2022 03:49), Max: 98.5 (01 Dec 2022 07:00)  HR: 89 (02 Dec 2022 03:49) (70 - 94)  BP: 162/95 (02 Dec 2022 03:49) (108/65 - 162/95)  RR: 18 (02 Dec 2022 03:49) (18 - 18)  SpO2: 98% (02 Dec 2022 03:49) (97% - 100%)    O2 Parameters below as of 02 Dec 2022 03:49  Patient On (Oxygen Delivery Method): nasal cannula  O2 Flow (L/min): 2      General: NAD, Lying in bed comfortably  Neuro: A+Ox3, no focal deficits  Resp: No respiratory distress or accessory muscle use. no supplemental O2  Abd: morbidly obese, NT to palpation      42M here for MSSA bacteremia 2/2 infected AVF, now with nausea/vomiting and no BM since prior to admission.  -zofran once; d/w pharmacy okay to be taken with dialysis  -dialysis as planned in AM  -f/u AXR  -npo for permacath in AM  -will continue to monitor

## 2022-12-02 NOTE — PROGRESS NOTE ADULT - ASSESSMENT
42-year-old male with past medical history ESRD on HD (at home, M to F via AV graft), COPD on home oxygen 4L, bipolar disorder, schizophrenia presented to [place] fever and chills x1 day. Transferred to [facility] for continued MSSA bacteremia management and further imaging, now s/p LUE AV graft excision 11/23 now s/p ALBINO without veggies, awaiting cleared blood cultures due 12/2 for permacath placement and dispo to [facility] with  HD

## 2022-12-02 NOTE — PROGRESS NOTE ADULT - SUBJECTIVE AND OBJECTIVE BOX
Jonel Kwan MD  Division of Hospital Medicine  Available on MS teams until 7pm  If no response or off-hours, page [phone]  -------------------------------------    Patient is a 42y old  Male who presents with a chief complaint of fevers and chills (02 Dec 2022 10:38)    SUBJECTIVE / OVERNIGHT EVENTS: Shiley clotted while in HD this morning  ADDITIONAL REVIEW OF SYSTEMS: pt feels fine, no new complaints.     MEDICATIONS  (STANDING):  ceFAZolin   IVPB 1000 milliGRAM(s) IV Intermittent every 24 hours  chlorhexidine 4% Liquid 1 Application(s) Topical daily  epoetin cyndie-epbx (RETACRIT) Injectable 51641 Unit(s) IV Push <User Schedule>  guaiFENesin  milliGRAM(s) Oral every 12 hours  mirtazapine 15 milliGRAM(s) Oral at bedtime  PARoxetine 20 milliGRAM(s) Oral daily  polyethylene glycol 3350 17 Gram(s) Oral two times a day  QUEtiapine 300 milliGRAM(s) Oral at bedtime  risperiDONE   Tablet 2 milliGRAM(s) Oral daily  senna 2 Tablet(s) Oral at bedtime  sevelamer carbonate 800 milliGRAM(s) Oral three times a day with meals  simvastatin 40 milliGRAM(s) Oral at bedtime    MEDICATIONS  (PRN):  benzonatate 100 milliGRAM(s) Oral every 8 hours PRN Cough  HYDROmorphone  Injectable 1 milliGRAM(s) IV Push every 4 hours PRN Severe Pain (7 - 10)      CAPILLARY BLOOD GLUCOSE        I&O's Summary    01 Dec 2022 07:01  -  02 Dec 2022 07:00  --------------------------------------------------------  IN: 540 mL / OUT: 0 mL / NET: 540 mL    02 Dec 2022 07:01  -  02 Dec 2022 16:12  --------------------------------------------------------  IN: 0 mL / OUT: 900 mL / NET: -900 mL        PHYSICAL EXAM:  Vital Signs Last 24 Hrs  T(C): 36.9 (02 Dec 2022 15:27), Max: 36.9 (02 Dec 2022 01:31)  T(F): 97.5 (02 Dec 2022 15:23), Max: 98.5 (02 Dec 2022 13:59)  HR: 76 (02 Dec 2022 15:27) (71 - 89)  BP: 125/75 (02 Dec 2022 15:27) (125/75 - 162/95)  BP(mean): 80 (02 Dec 2022 15:27) (80 - 80)  RR: 17 (02 Dec 2022 15:27) (17 - 18)  SpO2: 100% (02 Dec 2022 15:27) (97% - 100%)    Parameters below as of 02 Dec 2022 15:27    O2 Flow (L/min): 3    CONSTITUTIONAL: NAD  EYES: PERRLA; conjunctiva and sclera clear  ENMT: MMM  NECK: Supple  RESPIRATORY: Normal respiratory effort; CTAB  CARDIOVASCULAR: RRR, no JVD, no peripheral edema, +R shiley  ABDOMEN: Nontender to palpation, normoactive BS, no guarding/rigidity  MUSCLOSKELETAL:  no clubbing/cyanosis, no joint swelling or tenderness to palpation  PSYCH: A+O x 3, affect normal  NEUROLOGY: CN 2-12 are intact and symmetric; no gross sensory or motor deficits  SKIN: No rashes; no palpable lesions    LABS:                        7.5    10.07 )-----------( 354      ( 02 Dec 2022 11:07 )             23.9     12-02    128<L>  |  85<L>  |  70<H>  ----------------------------<  124<H>  4.5   |  23  |  10.15<H>    Ca    10.1      02 Dec 2022 11:07                Culture - Blood (collected 29 Nov 2022 21:12)  Source: .Blood Blood-Venous  Preliminary Report (01 Dec 2022 02:03):    No growth to date.    Culture - Blood (collected 29 Nov 2022 21:12)  Source: .Blood Blood  Preliminary Report (01 Dec 2022 02:03):    No growth to date.        RADIOLOGY & ADDITIONAL TESTS:  Results Reviewed:   Imaging Personally Reviewed:  Electrocardiogram Personally Reviewed:    COORDINATION OF CARE:  Care Discussed with Consultants/Other Providers [Y/N]: HD, IR, floor np  Prior or Outpatient Records Reviewed [Y/N]:

## 2022-12-02 NOTE — PROCEDURE NOTE - NSPOSTPRCRAD_GEN_A_CORE
central line located in the superior vena cava/depth of insertion/line adjusted to depth of insertion/no pneumothorax/post-procedure radiography performed
central line located in the superior vena cava/no pneumothorax

## 2022-12-02 NOTE — PRE-ANESTHESIA EVALUATION ADULT - MALLAMPATI CLASS
Class I (easy) - visualization of the soft palate, fauces, uvula, and both anterior and posterior pillars
Class II - visualization of the soft palate, fauces, and uvula
Class III - visualization of the soft palate and the base of the uvula
Class III - visualization of the soft palate and the base of the uvula

## 2022-12-02 NOTE — CONSULT NOTE ADULT - CONSULT REQUESTED DATE/TIME
20-Nov-2022 23:29
20-Nov-2022 23:41
21-Nov-2022 10:13
22-Nov-2022 14:48
21-Nov-2022 10:51
02-Dec-2022 08:52

## 2022-12-02 NOTE — CONSULT NOTE ADULT - SUBJECTIVE AND OBJECTIVE BOX
Interventional Radiology    Evaluate for Procedure:    non tunneled HD to tunnelled HD conversion     HPI: 42-year-old male with past medical history ESRD on HD (at home, M to F via Left AV graft), COPD on home oxygen 4L, bipolar disorder, schizophrenia presents with fever and chills x1 day.   Now with MSSA bacteremia.   11/21 s/p IR placement of Rt IJ shiley for emergent HD.  ID cleared patient for permcath placement 12/1.  IR now consulted on 12/2 for non tunneled HD to tunnelled HD conversion.        Allergies: aspirin (Swelling)  penicillins (Swelling)    Medications (Abx/Cardiac/Anticoagulation/Blood Products)    ceFAZolin   IVPB: 100 mL/Hr IV Intermittent (12-01 @ 22:52)  heparin   Injectable: 5000 Unit(s) SubCutaneous (12-02 @ 05:17)    Data:    T(C): 36.8  HR: 85  BP: 158/87  RR: 18  SpO2: 98%    -WBC 10.55 / HgB 8.6 / Hct 28.1 / Plt 355  -Na 128 / Cl 85 / BUN 58 / Glucose 209  -K 4.4 / CO2 22 / Cr 8.35  -ALT -- / Alk Phos -- / T.Bili --  -INR 1.17 / PTT --    Radiology: reviewed     Assessment/Plan:   42-year-old male with past medical history ESRD on HD (at home, M to F via Left AV graft), COPD on home oxygen 4L, bipolar disorder, schizophrenia presents with fever and chills x1 day.   Now with MSSA bacteremia.   11/21 s/p IR placement of Rt IJ shiley for emergent HD.  ID cleared patient for permcath placement 12/1.  IR now consulted on 12/2 for non tunneled HD to tunnelled HD conversion.      - case reviewed and approved for today 12/2 for non tunneled HD to tunnelled HD conversion with Anesthesia.  - ID cleared 12/1.  blood cx clear.   - current covid pcr from 11/28  - please place IR procedure order under PA JALILI   - STAT labs in AM (cbc,coags, bmp, T&S)  - hold AC / AP x 24hr.   - ok to Resume SQH on 12/3  - KEEP PATIENT NPO   - COVID PCR results within 5 days of planned procedure  - d/w primary team    case d/w w/IR attending Dr. Shah

## 2022-12-02 NOTE — PROGRESS NOTE ADULT - PROBLEM SELECTOR PLAN 1
Found to have MSSA bacteremia 2/2 possible AVF infection d/t noted purulence. Transferred to [facility] for further imaging. S/p empiric treatment with vanc (11/19) and cefepime (11/19). Continued on ancef. Leukocytosis improved. LUE negative for findings of DVT.     -blood culture - MSSA, cultures 11/24 still positive, 11/27 prelim negative. Will need 6 week course of abx from negative culture, PICC line when closer to discharge   - c/w Ancef 11/20- through 1/8/23  - s/p R IJ shiley placed 11/21- now clotted- planned for IR permacath 12/2  - s/p LUE graft excision on 11/23 now on wound vac   - TTE unchanged from 2019, ALBINO no vegetations  - noted gradually uptrending WBC, reactive vs infection, continue to monitor for clinical signs of infection

## 2022-12-02 NOTE — PROCEDURE NOTE - ADDITIONAL PROCEDURE DETAILS
-monitor for bleeding  -head of bed >45 degrees to prevent oozing  - tip is at the SVC, catheter is OK to use.   - SQH may be resumed @ 6h post procedure if no sign of bleeding  - all other orders and diet may be resumed per primary team

## 2022-12-02 NOTE — PROCEDURE NOTE - NSPOSTCAREGUIDE_GEN_A_CORE
Care for catheter as per unit/ICU protocols
Verbal/written post procedure instructions were given to patient/caregiver/Instructed patient/caregiver to follow-up with primary care physician/Instructed patient/caregiver regarding signs and symptoms of infection/Keep the cast/splint/dressing clean and dry

## 2022-12-02 NOTE — PROGRESS NOTE ADULT - ASSESSMENT
42-year-old male with past medical history ESRD on HD (at home, M to F via AV graft), COPD on home oxygen 4L, bipolar disorder, schizophrenia presents with fever and chills x1 day. Patient states he had a fever of up to 104 at home and in [place] ED was Tmax 100.6    ESRD on HD  Infected access, MSSA bacteremia, Septic Pulm emboli  S/P AVG excision 11/23/22  On Cefazolin per Infectious disease specialist  Has right IJ SHiley that is malfunctioning during HD, S/P cathflo--> Plan for Permacath conversion today by IR  Would prefer an AVF placed by Vascular prior to discharge  [facility]  s/p HD yesterday- tolerated well  Plan for next HD tomm  Renal diet  Daily BMP    HTN  UF on HD  resume home BP med regimen  overall bp better    Anemia of CKD  Epo during HD  20k tiw with hd  trend hgb    BMD of CKD  Hyperphosphatemia- phos is 6.7  renvela with meals  trend etta phos    Hyponatremia  likely volume related  UF on HD  trend na      For any question, call:  Cell # [phone]  Pager # [phone]  Callback # [phone]

## 2022-12-02 NOTE — CHART NOTE - NSCHARTNOTEFT_GEN_A_CORE
Continues to do well from vascular surgery perspective. Arm pain decreased. Continues to have intact motor and sensation of LUE with intact pulses. Vac change for wound scheduled today. Otherwise WBC trending down, last set of  BCx neg, and H/H improved.  s/p ligation of LUE AVG for infection - continue wound vac LUE, stable for DC from vascular surgery perspective, cont abx 6 weeks per ID

## 2022-12-02 NOTE — PROGRESS NOTE ADULT - PROBLEM SELECTOR PLAN 3
Patient complete dialysis at home M/Tue/off Wed/Thur/ off Fri/ Sat/ Sun. AVF in place (prior hx of aneurysm per [facility] records)  - currently getting dialysis through [facility]  - Will need permacath placement when cleared by ID- IR anesthesiologist amenable to doing case today despite having received HD, per IR tech.  -HD per nephro

## 2022-12-02 NOTE — PRE PROCEDURE NOTE - PRE PROCEDURE EVALUATION
Interventional Radiology    HPI: 42-year-old male with past medical history ESRD on HD (at home, M to F via Left AV graft), COPD on home oxygen 4L, bipolar disorder, schizophrenia presents with fever and chills x1 day.   Now with MSSA bacteremia. IR consulted on 11/21 for need of Shiley for emergent HD.      NPO:  n/a    Allergies: aspirin (Swelling)  penicillins (Swelling)    Medications (Abx/Cardiac/Anticoagulation/Blood Products)  ceFAZolin   IVPB: 100 mL/Hr IV Intermittent (11-21 @ 05:30)    Data:     T(C): 36.7  HR: 89  BP: 166/89  RR: 18  SpO2: 100%           Exam  General: No acute distress  Chest: Non labored breathing    -WBC 8.46 / HgB 8.3 / Hct 25.8 / Plt 122  -Na 129 / Cl 84 /  / Glucose 97  -K 5.6 / CO2 20 / Cr 13.97  -ALT 28 / Alk Phos 146 / T.Bili 0.8  -INR1.15    Imaging:   reviewed     Plan: 42y Male presents for non tunneled cvc for HD  -Risks/Benefits/alternatives explained with the patient and/or healthcare proxy and witnessed informed consent obtained.   
Procedure: Tunneled Hemodialysis Catheter Placement    Indication: ESRD requiring long term dialysis      Clinical History: 42M with ESRD requiring long term dialysis. Now planned for tunneled hemodialysis catheter placement.      Meds:benzonatate 100 milliGRAM(s) Oral every 8 hours PRN  ceFAZolin   IVPB 1000 milliGRAM(s) IV Intermittent every 24 hours  chlorhexidine 4% Liquid 1 Application(s) Topical daily  epoetin cyndie-epbx (RETACRIT) Injectable 56851 Unit(s) IV Push <User Schedule>  guaiFENesin  milliGRAM(s) Oral every 12 hours  HYDROmorphone  Injectable 1 milliGRAM(s) IV Push every 4 hours PRN  mirtazapine 15 milliGRAM(s) Oral at bedtime  PARoxetine 20 milliGRAM(s) Oral daily  polyethylene glycol 3350 17 Gram(s) Oral two times a day  QUEtiapine 300 milliGRAM(s) Oral at bedtime  risperiDONE   Tablet 2 milliGRAM(s) Oral daily  senna 2 Tablet(s) Oral at bedtime  sevelamer carbonate 800 milliGRAM(s) Oral three times a day with meals  simvastatin 40 milliGRAM(s) Oral at bedtime      Allergies:aspirin (Swelling)  fish (Unknown)  penicillins (Swelling)  shellfish (Rash)        Labs:                           7.5    10.07 )-----------( 354      ( 02 Dec 2022 11:07 )             23.9       12-02    128<L>  |  85<L>  |  70<H>  ----------------------------<  124<H>  4.5   |  23  |  10.15<H>    Ca    10.1      02 Dec 2022 11:07          Physical Exam: NAD, Right IJ Hemodialysis CAtheter in place      Anesthesia: Sedation by Anesthesia

## 2022-12-02 NOTE — PROGRESS NOTE ADULT - SUBJECTIVE AND OBJECTIVE BOX
[facility] - HUSSAIN Sanchez / HUSSAIN Villalobos / ERNESTO Mehta/ HUSSAIN Mauricio/ HUSSAIN Barber/ IVONNE Hui / JADEN La / BRANDON Alexander  ---------------------------------------------------------------------------------------------------------------  seen and examined today for ESRD  Interval : catheter malfunction noted today, plan for exchange to a PC  VITALS:  T(F): 98.1 (12-02-22 @ 08:35), Max: 98.4 (12-02-22 @ 01:31)  HR: 75 (12-02-22 @ 08:35)  BP: 149/83 (12-02-22 @ 08:35)  RR: 17 (12-02-22 @ 08:35)  SpO2: 100% (12-02-22 @ 08:35)  Wt(kg): --    12-01 @ 07:01  -  12-02 @ 07:00  --------------------------------------------------------  IN: 540 mL / OUT: 0 mL / NET: 540 mL      Physical Exam :-  Constitutional: NAD  Neck: Supple.  Respiratory: Bilateral equal breath sounds,  Cardiovascular: S1, S2 normal,  Gastrointestinal: Bowel Sounds present, soft, non tender.  Extremities: 1+ edema  Neurological: Alert and Oriented x 3, no focal deficits  Psychiatric: Normal mood, normal affect  Data:-  Allergies :   aspirin (Swelling)  fish (Unknown)  penicillins (Swelling)  shellfish (Rash)    Hospital Medications:   MEDICATIONS  (STANDING):  alteplase for catheter clearance 2 milliGRAM(s) Catheter once  alteplase for catheter clearance 2 milliGRAM(s) Catheter once  ceFAZolin   IVPB 1000 milliGRAM(s) IV Intermittent every 24 hours  chlorhexidine 4% Liquid 1 Application(s) Topical daily  epoetin cyndie-epbx (RETACRIT) Injectable 82443 Unit(s) IV Push <User Schedule>  guaiFENesin  milliGRAM(s) Oral every 12 hours  mirtazapine 15 milliGRAM(s) Oral at bedtime  PARoxetine 20 milliGRAM(s) Oral daily  polyethylene glycol 3350 17 Gram(s) Oral two times a day  QUEtiapine 300 milliGRAM(s) Oral at bedtime  risperiDONE   Tablet 2 milliGRAM(s) Oral daily  senna 2 Tablet(s) Oral at bedtime  sevelamer carbonate 800 milliGRAM(s) Oral three times a day with meals  simvastatin 40 milliGRAM(s) Oral at bedtime    12-01    128<L>  |  85<L>  |  58<H>  ----------------------------<  209<H>  4.4   |  22  |  8.35<H>    Ca    10.2      01 Dec 2022 10:33      Creatinine Trend: 8.35 <--, 8.31 <--, 8.97 <--, 10.51 <--, 8.46 <--, 9.64 <--                        8.6    10.55 )-----------( 355      ( 01 Dec 2022 10:33 )             28.1

## 2022-12-02 NOTE — PRE-ANESTHESIA EVALUATION ADULT - NSANTHPMHFT_GEN_ALL_CORE
42-year-old male with past medical history ESRD on HD (at home, M to F via AV graft), COPD on home oxygen 4L, bipolar disorder, schizophrenia presented to [place] fever and chills x1 day. Transferred to [facility] for continued MSSA bacteremia management and further imaging, now s/p LUE AV graft excision 11/23 now pending TTE
41 yo M w/ PMHx of morbid obesity, KRYSTIN, ESRD on HD, COPD (on home 02 @ 4L), bipolar disorder, schizophrenia/anxiety, DM, HTN presented to [place] w/ severe sepsis secondary to MSSA bacteremia, source felt to be LUE AV fistula. Presents now for LUE AVF ligation.    Denies active CP/SOB/Orthopnea  Denies hx of MI/CHF
current shiley not functional

## 2022-12-02 NOTE — PRE-ANESTHESIA EVALUATION ADULT - NSANTHAPLANRD_GEN_ALL_CORE
general
monitored anesthesia care (MAC)

## 2022-12-03 LAB — SARS-COV-2 RNA SPEC QL NAA+PROBE: SIGNIFICANT CHANGE UP

## 2022-12-03 PROCEDURE — 99233 SBSQ HOSP IP/OBS HIGH 50: CPT

## 2022-12-03 RX ADMIN — HYDROMORPHONE HYDROCHLORIDE 1 MILLIGRAM(S): 2 INJECTION INTRAMUSCULAR; INTRAVENOUS; SUBCUTANEOUS at 08:13

## 2022-12-03 RX ADMIN — HYDROMORPHONE HYDROCHLORIDE 1 MILLIGRAM(S): 2 INJECTION INTRAMUSCULAR; INTRAVENOUS; SUBCUTANEOUS at 18:28

## 2022-12-03 RX ADMIN — RISPERIDONE 2 MILLIGRAM(S): 4 TABLET ORAL at 12:24

## 2022-12-03 RX ADMIN — HYDROMORPHONE HYDROCHLORIDE 1 MILLIGRAM(S): 2 INJECTION INTRAMUSCULAR; INTRAVENOUS; SUBCUTANEOUS at 09:00

## 2022-12-03 RX ADMIN — Medication 20 MILLIGRAM(S): at 12:24

## 2022-12-03 RX ADMIN — HYDROMORPHONE HYDROCHLORIDE 1 MILLIGRAM(S): 2 INJECTION INTRAMUSCULAR; INTRAVENOUS; SUBCUTANEOUS at 18:45

## 2022-12-03 RX ADMIN — SEVELAMER CARBONATE 800 MILLIGRAM(S): 2400 POWDER, FOR SUSPENSION ORAL at 08:13

## 2022-12-03 RX ADMIN — Medication 600 MILLIGRAM(S): at 08:13

## 2022-12-03 RX ADMIN — CHLORHEXIDINE GLUCONATE 1 APPLICATION(S): 213 SOLUTION TOPICAL at 13:48

## 2022-12-03 RX ADMIN — SEVELAMER CARBONATE 800 MILLIGRAM(S): 2400 POWDER, FOR SUSPENSION ORAL at 13:48

## 2022-12-03 RX ADMIN — HYDROMORPHONE HYDROCHLORIDE 1 MILLIGRAM(S): 2 INJECTION INTRAMUSCULAR; INTRAVENOUS; SUBCUTANEOUS at 13:00

## 2022-12-03 RX ADMIN — HYDROMORPHONE HYDROCHLORIDE 1 MILLIGRAM(S): 2 INJECTION INTRAMUSCULAR; INTRAVENOUS; SUBCUTANEOUS at 12:24

## 2022-12-03 NOTE — PROGRESS NOTE ADULT - PROBLEM SELECTOR PLAN 11
- Diet: renal   - DVT: heparin subQ  - PT    dispo: likely [facility] with HD pending permacath placement, pain control- anticipate this weekend vs. monday - Diet: renal   - DVT: heparin subQ  - PT    dispo: likely [facility] with HD pending inpatient AVF- anticipate this weekend vs. monday

## 2022-12-03 NOTE — PROGRESS NOTE ADULT - PROBLEM SELECTOR PLAN 3
Patient complete dialysis at home M/Tue/off Wed/Thur/ off Fri/ Sat/ Sun. AVF in place (prior hx of aneurysm per [facility] records)  - s/p permacath placement by IR 12/2  -HD per nephro Patient complete dialysis at home M/Tue/off Wed/Thur/ off Fri/ Sat/ Sun. AVF in place (prior hx of aneurysm per [facility] records)  - s/p permacath placement by IR 12/2  - renal attg to discuss with vascular about placing new RUE AVF as inpatient  -HD per nephro

## 2022-12-03 NOTE — PROGRESS NOTE ADULT - ASSESSMENT
42-year-old male with past medical history ESRD on HD (at home, M to F via AV graft), COPD on home oxygen 4L, bipolar disorder, schizophrenia presents with fever and chills x1 day. Patient states he had a fever of up to 104 at home and in [place] ED was Tmax 100.6    ESRD on HD  Infected access, MSSA bacteremia, Septic Pulm emboli  S/P AVG excision 11/23/22  On Cefazolin per Infectious disease specialist  s/p HD yesterday- only received 2 hours 2/2 poor flows from ij shiley--> However now pt with new PC placement and old shiley taken out  Plan for HD today 2k bath and 4 hours  Would prefer an AVF placed by Vascular prior to discharge  Outpt HD set up at [facility]  Renal diet  Daily BMP    HTN  UF on HD  resume home BP med regimen  overall bp better    Anemia of CKD  Epo during HD  20k tiw with hd  trend hgb    BMD of CKD  Hyperphosphatemia- phos is 6.7  renvela with meals  trend etta phos    Hyponatremia  likely volume related  UF on HD  trend na      Dr Mauricio  [phone]

## 2022-12-03 NOTE — PROGRESS NOTE ADULT - SUBJECTIVE AND OBJECTIVE BOX
Patient seen and examined  no complaints    aspirin (Swelling)  fish (Unknown)  penicillins (Swelling)  shellfish (Rash)    Hospital Medications:   MEDICATIONS  (STANDING):  ceFAZolin   IVPB 1000 milliGRAM(s) IV Intermittent every 24 hours  chlorhexidine 4% Liquid 1 Application(s) Topical daily  epoetin cyndie-epbx (RETACRIT) Injectable 56235 Unit(s) IV Push <User Schedule>  guaiFENesin  milliGRAM(s) Oral every 12 hours  mirtazapine 15 milliGRAM(s) Oral at bedtime  PARoxetine 20 milliGRAM(s) Oral daily  polyethylene glycol 3350 17 Gram(s) Oral two times a day  QUEtiapine 300 milliGRAM(s) Oral at bedtime  risperiDONE   Tablet 2 milliGRAM(s) Oral daily  senna 2 Tablet(s) Oral at bedtime  sevelamer carbonate 800 milliGRAM(s) Oral three times a day with meals  simvastatin 40 milliGRAM(s) Oral at bedtime        VITALS:  T(F): 98.6 (12-03-22 @ 06:55), Max: 98.9 (12-03-22 @ 01:27)  HR: 82 (12-03-22 @ 06:55)  BP: 155/82 (12-03-22 @ 06:55)  RR: 18 (12-03-22 @ 06:55)  SpO2: 96% (12-03-22 @ 06:55)  Wt(kg): --    12-02 @ 07:01  -  12-03 @ 07:00  --------------------------------------------------------  IN: 200 mL / OUT: 900 mL / NET: -700 mL    12-03 @ 07:01  -  12-03 @ 11:32  --------------------------------------------------------  IN: 240 mL / OUT: 0 mL / NET: 240 mL      Height (cm): 200.7 (12-02 @ 15:27)  Weight (kg): 196.6 (12-02 @ 15:27)  BMI (kg/m2): 48.8 (12-02 @ 15:27)  BSA (m2): 3.17 (12-02 @ 15:27)  Physical Exam :-  Constitutional: NAD  Neck: Supple.  Respiratory: Bilateral equal breath sounds,  Cardiovascular: S1, S2 normal,  Gastrointestinal: Bowel Sounds present, soft, non tender.  Extremities: 1+ edema  Neurological: Alert and Oriented x 3, no focal deficits  Psychiatric: Normal mood, normal affect    LABS:  12-02    128<L>  |  85<L>  |  70<H>  ----------------------------<  124<H>  4.5   |  23  |  10.15<H>    Ca    10.1      02 Dec 2022 11:07      Creatinine Trend: 10.15 <--, 8.35 <--, 8.31 <--, 8.97 <--, 10.51 <--, 8.46 <--, 9.64 <--                        7.5    10.07 )-----------( 354      ( 02 Dec 2022 11:07 )             23.9     Urine Studies:      RADIOLOGY & ADDITIONAL STUDIES:

## 2022-12-03 NOTE — PROGRESS NOTE ADULT - PROBLEM SELECTOR PLAN 1
Found to have MSSA bacteremia 2/2 possible AVF infection d/t noted purulence. Transferred to [facility] for further imaging. S/p empiric treatment with vanc (11/19) and cefepime (11/19). Continued on ancef. Leukocytosis improved. LUE negative for findings of DVT.     -blood culture - MSSA, cultures 11/24 still positive, 11/27 prelim negative. Will need 6 week course of abx from negative culture, PICC line when closer to discharge   - c/w Ancef 11/20- through 1/8/23  - s/p R IJ shiley placed 11/21- now clotted- planned for IR permacath 12/2  - s/p LUE graft excision on 11/23 now on wound vac   - TTE unchanged from 2019, ALBINO no vegetations

## 2022-12-03 NOTE — PROGRESS NOTE ADULT - SUBJECTIVE AND OBJECTIVE BOX
Jonel Kwan MD  Division of Hospital Medicine  Available on MS teams until 7pm  If no response or off-hours, page [phone]  -------------------------------------    Patient is a 42y old  Male who presents with a chief complaint of fevers and chills (03 Dec 2022 11:32)      SUBJECTIVE / OVERNIGHT EVENTS: none acute  ADDITIONAL REVIEW OF SYSTEMS: pt s/p shiley to permacath conversion, tolerated well. Otherwise no new complaints.     MEDICATIONS  (STANDING):  ceFAZolin   IVPB 1000 milliGRAM(s) IV Intermittent every 24 hours  chlorhexidine 4% Liquid 1 Application(s) Topical daily  epoetin cyndie-epbx (RETACRIT) Injectable 41660 Unit(s) IV Push <User Schedule>  guaiFENesin  milliGRAM(s) Oral every 12 hours  mirtazapine 15 milliGRAM(s) Oral at bedtime  PARoxetine 20 milliGRAM(s) Oral daily  polyethylene glycol 3350 17 Gram(s) Oral two times a day  QUEtiapine 300 milliGRAM(s) Oral at bedtime  risperiDONE   Tablet 2 milliGRAM(s) Oral daily  senna 2 Tablet(s) Oral at bedtime  sevelamer carbonate 800 milliGRAM(s) Oral three times a day with meals  simvastatin 40 milliGRAM(s) Oral at bedtime    MEDICATIONS  (PRN):  benzonatate 100 milliGRAM(s) Oral every 8 hours PRN Cough  HYDROmorphone  Injectable 1 milliGRAM(s) IV Push every 4 hours PRN Severe Pain (7 - 10)  sodium chloride 0.9% lock flush 10 milliLiter(s) IV Push every 1 hour PRN Pre/post blood products, medications, blood draw, and to maintain line patency      CAPILLARY BLOOD GLUCOSE        I&O's Summary    02 Dec 2022 07:01  -  03 Dec 2022 07:00  --------------------------------------------------------  IN: 200 mL / OUT: 900 mL / NET: -700 mL    03 Dec 2022 07:01  -  03 Dec 2022 14:05  --------------------------------------------------------  IN: 420 mL / OUT: 0 mL / NET: 420 mL        PHYSICAL EXAM:  Vital Signs Last 24 Hrs  T(C): 36.9 (03 Dec 2022 13:53), Max: 37.2 (03 Dec 2022 01:27)  T(F): 98.5 (03 Dec 2022 13:53), Max: 98.9 (03 Dec 2022 01:27)  HR: 72 (03 Dec 2022 13:53) (72 - 87)  BP: 135/82 (03 Dec 2022 13:53) (124/74 - 166/87)  BP(mean): 80 (02 Dec 2022 15:27) (80 - 80)  RR: 18 (03 Dec 2022 13:53) (15 - 19)  SpO2: 100% (03 Dec 2022 13:53) (94% - 100%)    Parameters below as of 03 Dec 2022 13:53  Patient On (Oxygen Delivery Method): nasal cannula  O2 Flow (L/min): 3    CONSTITUTIONAL: NAD, obese  EYES: PERRLA; conjunctiva and sclera clear  ENMT: MMM  NECK: Supple  RESPIRATORY: Normal respiratory effort; CTAB  CARDIOVASCULAR: RRR, no JVD, no peripheral edema, +L former avf site bandaged. +R chest permacath  ABDOMEN: Nontender to palpation, normoactive BS, no guarding/rigidity  MUSCLOSKELETAL:  no clubbing/cyanosis, no joint swelling or tenderness to palpation  PSYCH: A+O x 3, affect normal  NEUROLOGY: CN 2-12 are intact and symmetric; no gross sensory or motor deficits  SKIN: No rashes; no palpable lesions    LABS:                        7.5    10.07 )-----------( 354      ( 02 Dec 2022 11:07 )             23.9     12-02    128<L>  |  85<L>  |  70<H>  ----------------------------<  124<H>  4.5   |  23  |  10.15<H>    Ca    10.1      02 Dec 2022 11:07                  RADIOLOGY & ADDITIONAL TESTS:  Results Reviewed:   Imaging Personally Reviewed:  Electrocardiogram Personally Reviewed:    COORDINATION OF CARE:  Care Discussed with Consultants/Other Providers [Y/N]: nephrology  Prior or Outpatient Records Reviewed [Y/N]:

## 2022-12-03 NOTE — PROGRESS NOTE ADULT - ASSESSMENT
42-year-old male with past medical history ESRD on HD (at home, M to F via AV graft), COPD on home oxygen 4L, bipolar disorder, schizophrenia presented to [place] fever and chills x1 day. Transferred to [facility] for continued MSSA bacteremia management and further imaging, now s/p LUE AV graft excision 11/23 now s/p ALBINO without veggies, now s/p shiley to permacath conversion awaiting transition to [facility] with HD

## 2022-12-04 LAB
ANION GAP SERPL CALC-SCNC: 16 MMOL/L — SIGNIFICANT CHANGE UP (ref 5–17)
BUN SERPL-MCNC: 36 MG/DL — HIGH (ref 7–23)
CALCIUM SERPL-MCNC: 9.4 MG/DL — SIGNIFICANT CHANGE UP (ref 8.4–10.5)
CHLORIDE SERPL-SCNC: 93 MMOL/L — LOW (ref 96–108)
CO2 SERPL-SCNC: 25 MMOL/L — SIGNIFICANT CHANGE UP (ref 22–31)
CREAT SERPL-MCNC: 6.97 MG/DL — HIGH (ref 0.5–1.3)
EGFR: 9 ML/MIN/1.73M2 — LOW
GLUCOSE SERPL-MCNC: 184 MG/DL — HIGH (ref 70–99)
HCT VFR BLD CALC: 23.8 % — LOW (ref 39–50)
HGB BLD-MCNC: 7.3 G/DL — LOW (ref 13–17)
MAGNESIUM SERPL-MCNC: 2.2 MG/DL — SIGNIFICANT CHANGE UP (ref 1.6–2.6)
MCHC RBC-ENTMCNC: 30.7 GM/DL — LOW (ref 32–36)
MCHC RBC-ENTMCNC: 31.1 PG — SIGNIFICANT CHANGE UP (ref 27–34)
MCV RBC AUTO: 101.3 FL — HIGH (ref 80–100)
NRBC # BLD: 0 /100 WBCS — SIGNIFICANT CHANGE UP (ref 0–0)
PHOSPHATE SERPL-MCNC: 5.6 MG/DL — HIGH (ref 2.5–4.5)
PLATELET # BLD AUTO: 309 K/UL — SIGNIFICANT CHANGE UP (ref 150–400)
POTASSIUM SERPL-MCNC: 4.5 MMOL/L — SIGNIFICANT CHANGE UP (ref 3.5–5.3)
POTASSIUM SERPL-SCNC: 4.5 MMOL/L — SIGNIFICANT CHANGE UP (ref 3.5–5.3)
RBC # BLD: 2.35 M/UL — LOW (ref 4.2–5.8)
RBC # FLD: 15.4 % — HIGH (ref 10.3–14.5)
SODIUM SERPL-SCNC: 134 MMOL/L — LOW (ref 135–145)
WBC # BLD: 7.53 K/UL — SIGNIFICANT CHANGE UP (ref 3.8–10.5)
WBC # FLD AUTO: 7.53 K/UL — SIGNIFICANT CHANGE UP (ref 3.8–10.5)

## 2022-12-04 PROCEDURE — 99233 SBSQ HOSP IP/OBS HIGH 50: CPT

## 2022-12-04 RX ORDER — ACETAMINOPHEN 500 MG
650 TABLET ORAL ONCE
Refills: 0 | Status: COMPLETED | OUTPATIENT
Start: 2022-12-04 | End: 2022-12-04

## 2022-12-04 RX ADMIN — RISPERIDONE 2 MILLIGRAM(S): 4 TABLET ORAL at 12:51

## 2022-12-04 RX ADMIN — Medication 600 MILLIGRAM(S): at 08:42

## 2022-12-04 RX ADMIN — HYDROMORPHONE HYDROCHLORIDE 1 MILLIGRAM(S): 2 INJECTION INTRAMUSCULAR; INTRAVENOUS; SUBCUTANEOUS at 06:10

## 2022-12-04 RX ADMIN — Medication 20 MILLIGRAM(S): at 12:51

## 2022-12-04 RX ADMIN — QUETIAPINE FUMARATE 300 MILLIGRAM(S): 200 TABLET, FILM COATED ORAL at 22:33

## 2022-12-04 RX ADMIN — HYDROMORPHONE HYDROCHLORIDE 1 MILLIGRAM(S): 2 INJECTION INTRAMUSCULAR; INTRAVENOUS; SUBCUTANEOUS at 12:51

## 2022-12-04 RX ADMIN — SIMVASTATIN 40 MILLIGRAM(S): 20 TABLET, FILM COATED ORAL at 22:32

## 2022-12-04 RX ADMIN — SEVELAMER CARBONATE 800 MILLIGRAM(S): 2400 POWDER, FOR SUSPENSION ORAL at 12:51

## 2022-12-04 RX ADMIN — SEVELAMER CARBONATE 800 MILLIGRAM(S): 2400 POWDER, FOR SUSPENSION ORAL at 08:42

## 2022-12-04 RX ADMIN — HYDROMORPHONE HYDROCHLORIDE 1 MILLIGRAM(S): 2 INJECTION INTRAMUSCULAR; INTRAVENOUS; SUBCUTANEOUS at 00:20

## 2022-12-04 RX ADMIN — Medication 600 MILLIGRAM(S): at 00:05

## 2022-12-04 RX ADMIN — MIRTAZAPINE 15 MILLIGRAM(S): 45 TABLET, ORALLY DISINTEGRATING ORAL at 22:32

## 2022-12-04 RX ADMIN — Medication 600 MILLIGRAM(S): at 19:39

## 2022-12-04 RX ADMIN — HYDROMORPHONE HYDROCHLORIDE 1 MILLIGRAM(S): 2 INJECTION INTRAMUSCULAR; INTRAVENOUS; SUBCUTANEOUS at 13:30

## 2022-12-04 RX ADMIN — QUETIAPINE FUMARATE 300 MILLIGRAM(S): 200 TABLET, FILM COATED ORAL at 00:04

## 2022-12-04 RX ADMIN — SEVELAMER CARBONATE 800 MILLIGRAM(S): 2400 POWDER, FOR SUSPENSION ORAL at 17:37

## 2022-12-04 RX ADMIN — SIMVASTATIN 40 MILLIGRAM(S): 20 TABLET, FILM COATED ORAL at 00:03

## 2022-12-04 RX ADMIN — HYDROMORPHONE HYDROCHLORIDE 1 MILLIGRAM(S): 2 INJECTION INTRAMUSCULAR; INTRAVENOUS; SUBCUTANEOUS at 05:52

## 2022-12-04 RX ADMIN — HYDROMORPHONE HYDROCHLORIDE 1 MILLIGRAM(S): 2 INJECTION INTRAMUSCULAR; INTRAVENOUS; SUBCUTANEOUS at 00:05

## 2022-12-04 RX ADMIN — MIRTAZAPINE 15 MILLIGRAM(S): 45 TABLET, ORALLY DISINTEGRATING ORAL at 00:04

## 2022-12-04 RX ADMIN — Medication 100 MILLIGRAM(S): at 00:05

## 2022-12-04 RX ADMIN — HYDROMORPHONE HYDROCHLORIDE 1 MILLIGRAM(S): 2 INJECTION INTRAMUSCULAR; INTRAVENOUS; SUBCUTANEOUS at 19:54

## 2022-12-04 RX ADMIN — Medication 650 MILLIGRAM(S): at 05:52

## 2022-12-04 RX ADMIN — HYDROMORPHONE HYDROCHLORIDE 1 MILLIGRAM(S): 2 INJECTION INTRAMUSCULAR; INTRAVENOUS; SUBCUTANEOUS at 19:39

## 2022-12-04 RX ADMIN — CHLORHEXIDINE GLUCONATE 1 APPLICATION(S): 213 SOLUTION TOPICAL at 12:49

## 2022-12-04 NOTE — PROGRESS NOTE ADULT - PROBLEM SELECTOR PLAN 3
Patient complete dialysis at home M/Tue/off Wed/Thur/ off Fri/ Sat/ Sun. AVF in place (prior hx of aneurysm per [facility] records)  - s/p permacath placement by IR 12/2  - renal attg to discuss with vascular about placing new RUE AVF as inpatient  -HD per nephro

## 2022-12-04 NOTE — PROVIDER CONTACT NOTE (OTHER) - BACKGROUND
fevers
Dx peripheral vascular disease   PMH DM, HTN, COPD
Pt s/p LUE aneurysm resection and washout of infected fistula. Hx of schizophrenia, bipolar disorder, COPD, HTN, and ESRD. Pt placed on PCA dilaudid with minimal use of pump.
Pt was admitted on 11/20/22 and is s/p excision of infected AV Graft
Pt with hx of esrd on hd, copd, bipolar, schizophrenia, htn admitted for fever and chills
pt a&ox4, s/p LUE aneurysm resection and washout of infected fistula
Patient with PMH: bipolar disorder, schizophrenia, COPD, HTN and ESRD. Pt is s/p LUE aneurysm resection washout of infected fistula.

## 2022-12-04 NOTE — PROGRESS NOTE ADULT - SUBJECTIVE AND OBJECTIVE BOX
Patient seen and examined  no complaints    aspirin (Swelling)  fish (Unknown)  penicillins (Swelling)  shellfish (Rash)    Hospital Medications:   MEDICATIONS  (STANDING):  ceFAZolin   IVPB 1000 milliGRAM(s) IV Intermittent every 24 hours  chlorhexidine 4% Liquid 1 Application(s) Topical daily  epoetin cyndie-epbx (RETACRIT) Injectable 76930 Unit(s) IV Push <User Schedule>  guaiFENesin  milliGRAM(s) Oral every 12 hours  mirtazapine 15 milliGRAM(s) Oral at bedtime  PARoxetine 20 milliGRAM(s) Oral daily  polyethylene glycol 3350 17 Gram(s) Oral two times a day  QUEtiapine 300 milliGRAM(s) Oral at bedtime  risperiDONE   Tablet 2 milliGRAM(s) Oral daily  senna 2 Tablet(s) Oral at bedtime  sevelamer carbonate 800 milliGRAM(s) Oral three times a day with meals  simvastatin 40 milliGRAM(s) Oral at bedtime      VITALS:  T(F): 98.1 (12-04-22 @ 09:27), Max: 100.3 (12-04-22 @ 05:18)  HR: 81 (12-04-22 @ 09:27)  BP: 131/71 (12-04-22 @ 09:27)  RR: 18 (12-04-22 @ 09:27)  SpO2: 96% (12-04-22 @ 09:27)  Wt(kg): --    12-03 @ 07:01  -  12-04 @ 07:00  --------------------------------------------------------  IN: 420 mL / OUT: 3000 mL / NET: -2580 mL    Physical Exam :-  Constitutional: NAD  Neck: Supple.  Respiratory: Bilateral equal breath sounds,  Cardiovascular: S1, S2 normal,  Gastrointestinal: Bowel Sounds present, soft, non tender.  Extremities: 1+ edema  Neurological: Alert and Oriented x 3, no focal deficits  Psychiatric: Normal mood, normal affect      LABS:  12-04    134<L>  |  93<L>  |  36<H>  ----------------------------<  184<H>  4.5   |  25  |  6.97<H>    Ca    9.4      04 Dec 2022 07:10  Phos  5.6     12-04  Mg     2.2     12-04      Creatinine Trend: 6.97 <--, 10.15 <--, 8.35 <--, 8.31 <--, 8.97 <--, 10.51 <--                        7.3    7.53  )-----------( 309      ( 04 Dec 2022 07:08 )             23.8     Urine Studies:      RADIOLOGY & ADDITIONAL STUDIES:

## 2022-12-04 NOTE — PROGRESS NOTE ADULT - PROBLEM SELECTOR PLAN 1
Found to have MSSA bacteremia 2/2 possible AVF infection d/t noted purulence. Transferred to [facility] for further imaging. S/p empiric treatment with vanc (11/19) and cefepime (11/19). Continued on ancef. Leukocytosis improved. LUE negative for findings of DVT.     -blood culture - MSSA, cultures 11/24 still positive, 11/27 prelim negative. Will need 6 week course of abx from negative culture, PICC line when closer to discharge   - c/w Ancef 11/20- through 1/8/23  - s/p R IJ shiley placed 11/21- now clotted- now s/p new shiley- need to f/u with IR for permacath placement on monday 12/5  - s/p LUE graft excision on 11/23 now on wound vac   - TTE unchanged from 2019, ALBINO no vegetations

## 2022-12-04 NOTE — PROGRESS NOTE ADULT - SUBJECTIVE AND OBJECTIVE BOX
Jonel Kwan MD  Division of Hospital Medicine  Available on MS teams until 7pm  If no response or off-hours, page [phone]  -------------------------------------    Patient is a 42y old  Male who presents with a chief complaint of fevers and chills (04 Dec 2022 09:45)      SUBJECTIVE / OVERNIGHT EVENTS: none acute  ADDITIONAL REVIEW OF SYSTEMS: pt sleeping comfortably, arousable, denies new complaints.     MEDICATIONS  (STANDING):  ceFAZolin   IVPB 1000 milliGRAM(s) IV Intermittent every 24 hours  chlorhexidine 4% Liquid 1 Application(s) Topical daily  epoetin cyndie-epbx (RETACRIT) Injectable 26792 Unit(s) IV Push <User Schedule>  guaiFENesin  milliGRAM(s) Oral every 12 hours  mirtazapine 15 milliGRAM(s) Oral at bedtime  PARoxetine 20 milliGRAM(s) Oral daily  polyethylene glycol 3350 17 Gram(s) Oral two times a day  QUEtiapine 300 milliGRAM(s) Oral at bedtime  risperiDONE   Tablet 2 milliGRAM(s) Oral daily  senna 2 Tablet(s) Oral at bedtime  sevelamer carbonate 800 milliGRAM(s) Oral three times a day with meals  simvastatin 40 milliGRAM(s) Oral at bedtime    MEDICATIONS  (PRN):  benzonatate 100 milliGRAM(s) Oral every 8 hours PRN Cough  HYDROmorphone  Injectable 1 milliGRAM(s) IV Push every 4 hours PRN Severe Pain (7 - 10)  sodium chloride 0.9% lock flush 10 milliLiter(s) IV Push every 1 hour PRN Pre/post blood products, medications, blood draw, and to maintain line patency      CAPILLARY BLOOD GLUCOSE        I&O's Summary    03 Dec 2022 07:01  -  04 Dec 2022 07:00  --------------------------------------------------------  IN: 420 mL / OUT: 3000 mL / NET: -2580 mL    04 Dec 2022 07:01  -  04 Dec 2022 13:39  --------------------------------------------------------  IN: 360 mL / OUT: 0 mL / NET: 360 mL        PHYSICAL EXAM:  Vital Signs Last 24 Hrs  T(C): 36.4 (04 Dec 2022 12:42), Max: 37.9 (04 Dec 2022 05:18)  T(F): 97.6 (04 Dec 2022 12:42), Max: 100.3 (04 Dec 2022 05:18)  HR: 73 (04 Dec 2022 12:42) (72 - 99)  BP: 149/85 (04 Dec 2022 12:42) (120/72 - 177/85)  BP(mean): --  RR: 18 (04 Dec 2022 12:42) (18 - 18)  SpO2: 100% (04 Dec 2022 12:42) (93% - 100%)    Parameters below as of 04 Dec 2022 12:42  Patient On (Oxygen Delivery Method): nasal cannula  O2 Flow (L/min): 3    CONSTITUTIONAL: NAD, obese  EYES: PERRLA; conjunctiva and sclera clear  ENMT: MMM  NECK: Supple, R ?shiley  RESPIRATORY: Normal respiratory effort; CTAB  CARDIOVASCULAR: RRR, no JVD, no peripheral edema, +L avf site bandaged  ABDOMEN: Nontender to palpation, normoactive BS, no guarding/rigidity  MUSCLOSKELETAL:  no clubbing/cyanosis, no joint swelling or tenderness to palpation  PSYCH: A+O x 3, affect normal  NEUROLOGY: CN 2-12 are intact and symmetric; no gross sensory or motor deficits  SKIN: No rashes; no palpable lesions    LABS:                        7.3    7.53  )-----------( 309      ( 04 Dec 2022 07:08 )             23.8     12-04    134<L>  |  93<L>  |  36<H>  ----------------------------<  184<H>  4.5   |  25  |  6.97<H>    Ca    9.4      04 Dec 2022 07:10  Phos  5.6     12-04  Mg     2.2     12-04                  RADIOLOGY & ADDITIONAL TESTS:  Results Reviewed:   Imaging Personally Reviewed:  Electrocardiogram Personally Reviewed:    COORDINATION OF CARE:  Care Discussed with Consultants/Other Providers [Y/N]: nephrology  Prior or Outpatient Records Reviewed [Y/N]:

## 2022-12-04 NOTE — PROGRESS NOTE ADULT - PROBLEM SELECTOR PLAN 11
- Diet: renal   - DVT: heparin subQ  - PT    dispo: likely [facility] with HD pending permacath- anticipate this weekend vs. monday/tues

## 2022-12-04 NOTE — PROVIDER CONTACT NOTE (OTHER) - RECOMMENDATIONS
Make provider aware.
RADHA Main notified and aware
Tylenol 650mg Po x 1 dose
Notify provider. EKG? Labs?

## 2022-12-04 NOTE — PROGRESS NOTE ADULT - ASSESSMENT
42-year-old male with past medical history ESRD on HD (at home, M to F via AV graft), COPD on home oxygen 4L, bipolar disorder, schizophrenia presents with fever and chills x1 day. Patient states he had a fever of up to 104 at home and in [place] ED was Tmax 100.6    ESRD on HD  Infected access, MSSA bacteremia, Septic Pulm emboli  S/P AVG excision 11/23/22  On Cefazolin per Infectious disease specialist  s/p HD yesterday - tolerated ell  Pt s/p new catheter right ij--> need to clarify if tunneled vs non tunneled  hold off avf for now after d/w vascular--> avf out  Outpt HD set up at [facility]en[facility]  Renal diet  Daily BMP    HTN  UF on HD  resume home BP med regimen  overall bp better    Anemia of CKD  Epo during HD  20k tiw with hd  trend hgb    BMD of CKD  Hyperphosphatemia- phos is 6.7  renvela with meals  trend etta phos    Hyponatremia  likely volume related  UF on HD  trend na      Dr Mauricio  [phone]

## 2022-12-04 NOTE — PROGRESS NOTE ADULT - ASSESSMENT
42-year-old male with past medical history ESRD on HD (at home, M to F via AV graft), COPD on home oxygen 4L, bipolar disorder, schizophrenia presented to [place] fever and chills x1 day. Transferred to [facility] for continued MSSA bacteremia management and further imaging, now s/p LUE AV graft excision 11/23 now s/p ALBINO without veggies, now s/p shiley to permacath conversion awaiting transition to [facility] with HD

## 2022-12-05 LAB
CULTURE RESULTS: SIGNIFICANT CHANGE UP
CULTURE RESULTS: SIGNIFICANT CHANGE UP
HCT VFR BLD CALC: 23.1 % — LOW (ref 39–50)
HGB BLD-MCNC: 7.1 G/DL — LOW (ref 13–17)
MCHC RBC-ENTMCNC: 30.7 GM/DL — LOW (ref 32–36)
MCHC RBC-ENTMCNC: 31.3 PG — SIGNIFICANT CHANGE UP (ref 27–34)
MCV RBC AUTO: 101.8 FL — HIGH (ref 80–100)
NRBC # BLD: 0 /100 WBCS — SIGNIFICANT CHANGE UP (ref 0–0)
PLATELET # BLD AUTO: 305 K/UL — SIGNIFICANT CHANGE UP (ref 150–400)
RAPID RVP RESULT: SIGNIFICANT CHANGE UP
RBC # BLD: 2.27 M/UL — LOW (ref 4.2–5.8)
RBC # FLD: 15.4 % — HIGH (ref 10.3–14.5)
SARS-COV-2 RNA SPEC QL NAA+PROBE: SIGNIFICANT CHANGE UP
SPECIMEN SOURCE: SIGNIFICANT CHANGE UP
SPECIMEN SOURCE: SIGNIFICANT CHANGE UP
WBC # BLD: 8.18 K/UL — SIGNIFICANT CHANGE UP (ref 3.8–10.5)
WBC # FLD AUTO: 8.18 K/UL — SIGNIFICANT CHANGE UP (ref 3.8–10.5)

## 2022-12-05 PROCEDURE — 99233 SBSQ HOSP IP/OBS HIGH 50: CPT

## 2022-12-05 PROCEDURE — 99231 SBSQ HOSP IP/OBS SF/LOW 25: CPT

## 2022-12-05 PROCEDURE — 99232 SBSQ HOSP IP/OBS MODERATE 35: CPT

## 2022-12-05 RX ADMIN — HYDROMORPHONE HYDROCHLORIDE 1 MILLIGRAM(S): 2 INJECTION INTRAMUSCULAR; INTRAVENOUS; SUBCUTANEOUS at 08:08

## 2022-12-05 RX ADMIN — Medication 600 MILLIGRAM(S): at 08:07

## 2022-12-05 RX ADMIN — HYDROMORPHONE HYDROCHLORIDE 1 MILLIGRAM(S): 2 INJECTION INTRAMUSCULAR; INTRAVENOUS; SUBCUTANEOUS at 17:45

## 2022-12-05 RX ADMIN — HYDROMORPHONE HYDROCHLORIDE 1 MILLIGRAM(S): 2 INJECTION INTRAMUSCULAR; INTRAVENOUS; SUBCUTANEOUS at 03:18

## 2022-12-05 RX ADMIN — Medication 20 MILLIGRAM(S): at 19:56

## 2022-12-05 RX ADMIN — Medication 600 MILLIGRAM(S): at 19:55

## 2022-12-05 RX ADMIN — POLYETHYLENE GLYCOL 3350 17 GRAM(S): 17 POWDER, FOR SOLUTION ORAL at 19:55

## 2022-12-05 RX ADMIN — RISPERIDONE 2 MILLIGRAM(S): 4 TABLET ORAL at 11:42

## 2022-12-05 RX ADMIN — HYDROMORPHONE HYDROCHLORIDE 1 MILLIGRAM(S): 2 INJECTION INTRAMUSCULAR; INTRAVENOUS; SUBCUTANEOUS at 13:21

## 2022-12-05 RX ADMIN — HYDROMORPHONE HYDROCHLORIDE 1 MILLIGRAM(S): 2 INJECTION INTRAMUSCULAR; INTRAVENOUS; SUBCUTANEOUS at 08:38

## 2022-12-05 RX ADMIN — SIMVASTATIN 40 MILLIGRAM(S): 20 TABLET, FILM COATED ORAL at 22:18

## 2022-12-05 RX ADMIN — QUETIAPINE FUMARATE 300 MILLIGRAM(S): 200 TABLET, FILM COATED ORAL at 22:18

## 2022-12-05 RX ADMIN — Medication 100 MILLIGRAM(S): at 22:17

## 2022-12-05 RX ADMIN — HYDROMORPHONE HYDROCHLORIDE 1 MILLIGRAM(S): 2 INJECTION INTRAMUSCULAR; INTRAVENOUS; SUBCUTANEOUS at 12:51

## 2022-12-05 RX ADMIN — MIRTAZAPINE 15 MILLIGRAM(S): 45 TABLET, ORALLY DISINTEGRATING ORAL at 22:18

## 2022-12-05 RX ADMIN — SEVELAMER CARBONATE 800 MILLIGRAM(S): 2400 POWDER, FOR SUSPENSION ORAL at 19:55

## 2022-12-05 RX ADMIN — ERYTHROPOIETIN 20000 UNIT(S): 10000 INJECTION, SOLUTION INTRAVENOUS; SUBCUTANEOUS at 17:30

## 2022-12-05 RX ADMIN — HYDROMORPHONE HYDROCHLORIDE 1 MILLIGRAM(S): 2 INJECTION INTRAMUSCULAR; INTRAVENOUS; SUBCUTANEOUS at 22:33

## 2022-12-05 RX ADMIN — SEVELAMER CARBONATE 800 MILLIGRAM(S): 2400 POWDER, FOR SUSPENSION ORAL at 08:07

## 2022-12-05 RX ADMIN — SEVELAMER CARBONATE 800 MILLIGRAM(S): 2400 POWDER, FOR SUSPENSION ORAL at 11:42

## 2022-12-05 RX ADMIN — HYDROMORPHONE HYDROCHLORIDE 1 MILLIGRAM(S): 2 INJECTION INTRAMUSCULAR; INTRAVENOUS; SUBCUTANEOUS at 17:11

## 2022-12-05 RX ADMIN — HYDROMORPHONE HYDROCHLORIDE 1 MILLIGRAM(S): 2 INJECTION INTRAMUSCULAR; INTRAVENOUS; SUBCUTANEOUS at 03:03

## 2022-12-05 RX ADMIN — HYDROMORPHONE HYDROCHLORIDE 1 MILLIGRAM(S): 2 INJECTION INTRAMUSCULAR; INTRAVENOUS; SUBCUTANEOUS at 22:18

## 2022-12-05 NOTE — PROGRESS NOTE ADULT - PROBLEM SELECTOR PLAN 3
Patient complete dialysis at home M/Tue/off Wed/Thur/ off Fri/ Sat/ Sun. AVF in place (prior hx of aneurysm per [facility] records)  - s/p permacath placement by IR 12/2  - renal attg to discuss with vascular about placing new RUE AVF as inpatient  -HD per nephro

## 2022-12-05 NOTE — PROGRESS NOTE ADULT - ASSESSMENT
42-year-old male with past medical history ESRD on HD (at home, M to F via AV graft), COPD on home oxygen 4L, bipolar disorder, schizophrenia presents with fever and chills x1 day. Patient states he had a fever of up to 104 at home and in [place] ED was Tmax 100.6    ESRD on HD  Infected access, MSSA bacteremia, Septic Pulm emboli  S/P AVG excision 11/23/22  On Cefazolin per Infectious disease specialist  s/p Permacath placement by IR  Plan for HD today  hold off avf for now after d/w vascular--> avf outpt  Outpt HD set up at [facility]  Renal diet  Daily BMP    HTN  UF on HD  overall bp better    Anemia of CKD  Epo during HD  20k tiw with hd  trend hgb    BMD of CKD  Hyperphosphatemia- phos is 5.6  renvela with meals  trend etta phos    Hyponatremia  likely volume related  UF on HD  trend na      Dr Mauricio  [phone]

## 2022-12-05 NOTE — PROGRESS NOTE ADULT - SUBJECTIVE AND OBJECTIVE BOX
Interventional Radiology Follow-Up Note.    This is a 42y Male s/p conversion of non-tunnelled to tunnelled HD catheter placement on 12/2 in Interventional Radiology.     Patient seen and examined @ bedside. No complaint offered.    Medication:     ceFAZolin   IVPB: (12-04)    Vitals:   T(F): 98.3, Max: 98.8 (16:10)  HR: 88  BP: 158/78  RR: 18  SpO2: 98%    Physical Exam:  General: Nontoxic, in NAD.  Neck: right neck HD catheter site dressing c/d/i. Site soft w/o evidence of hematoma noted. nttp      LABS:  Na: 134 (12-04 @ 07:10), 128 (12-02 @ 11:07)  K: 4.5 (12-04 @ 07:10), 4.5 (12-02 @ 11:07)  Cl: 93 (12-04 @ 07:10), 85 (12-02 @ 11:07)  CO2: 25 (12-04 @ 07:10), 23 (12-02 @ 11:07)  BUN: 36 (12-04 @ 07:10), 70 (12-02 @ 11:07)  Cr: 6.97 (12-04 @ 07:10), 10.15 (12-02 @ 11:07)  Glu: 184(12-04 @ 07:10), 124(12-02 @ 11:07)    Hgb: 7.1 (12-05 @ 07:16), 7.3 (12-04 @ 07:08), 7.5 (12-02 @ 11:07)  Hct: 23.1 (12-05 @ 07:16), 23.8 (12-04 @ 07:08), 23.9 (12-02 @ 11:07)  WBC: 8.18 (12-05 @ 07:16), 7.53 (12-04 @ 07:08), 10.07 (12-02 @ 11:07)  Plt: 305 (12-05 @ 07:16), 309 (12-04 @ 07:08), 354 (12-02 @ 11:07)    INR:   PTT:       Assessment/Plan:  42y Male admitted with Peripheral vascular disease, ESRD s/p conversion of non-tunnelled to tunnelled HD catheter placement on 12/2 in Interventional Radiology.     - Okay to use catheter.  - IR will sign off.   -Global care per primary team.     Please call IR at  [phone] with any questions, concerns, or issues regarding above.    Also available on Teams.

## 2022-12-05 NOTE — PROGRESS NOTE ADULT - ASSESSMENT
42-year-old male with past medical history ESRD on HD (at home, M to F via AV graft), COPD on home oxygen 4L, bipolar disorder, schizophrenia presented to [place] fever and chills x1 day. Transferred to [facility] for continued MSSA bacteremia management and further imaging, now s/p LUE AV graft excision 11/23 now s/p ALBINO without vegetation, now s/p shiley to permacath conversion awaiting transition to [facility] with HD

## 2022-12-05 NOTE — PROGRESS NOTE ADULT - PROBLEM SELECTOR PLAN 1
STABLE NOW   Found to have MSSA bacteremia 2/2 possible AVF infection d/t noted purulence. Transferred to [facility] for further imaging. S/p empiric treatment with vanc (11/19) and cefepime (11/19). Continued on ancef. Leukocytosis improved. LUE negative for findings of DVT.     -blood culture - MSSA, cultures 11/24 positive,  FIRST NEGATIVE blood culture on 11/27.    AS per ID : Ancef 1GM IV q24h - for discharge dose three times weekly post HD 2GM, 2GM, 3GM through 1/8/23 for 6 weeks ---> Please confirm with the HD center about the administration of the ANCEF  during HD   - s/p R IJ shiley placed 11/21- now clotted- now s/p new shiley--> S/P Tunneled catheter by IR on 12/2  - s/p LUE graft excision on 11/23 now on wound vac -----> vasc surgery was contacted and they want pt to be dc with wound vac and will f/up with vascular surg oupt .  ID is ok with using the HD cathter for ABX .    - TTE unchanged from 2019, ALBINO no vegetations

## 2022-12-05 NOTE — PROGRESS NOTE ADULT - SUBJECTIVE AND OBJECTIVE BOX
Patient  seen and examined  no complaints    aspirin (Swelling)  fish (Unknown)  penicillins (Swelling)  shellfish (Rash)    Hospital Medications:   MEDICATIONS  (STANDING):  ceFAZolin   IVPB 1000 milliGRAM(s) IV Intermittent every 24 hours  chlorhexidine 4% Liquid 1 Application(s) Topical daily  epoetin cyndie-epbx (RETACRIT) Injectable 50402 Unit(s) IV Push <User Schedule>  guaiFENesin  milliGRAM(s) Oral every 12 hours  mirtazapine 15 milliGRAM(s) Oral at bedtime  PARoxetine 20 milliGRAM(s) Oral daily  polyethylene glycol 3350 17 Gram(s) Oral two times a day  QUEtiapine 300 milliGRAM(s) Oral at bedtime  risperiDONE   Tablet 2 milliGRAM(s) Oral daily  senna 2 Tablet(s) Oral at bedtime  sevelamer carbonate 800 milliGRAM(s) Oral three times a day with meals  simvastatin 40 milliGRAM(s) Oral at bedtime        VITALS:  T(F): 98.6 (12-05-22 @ 10:36), Max: 98.8 (12-04-22 @ 16:10)  HR: 79 (12-05-22 @ 10:36)  BP: 144/76 (12-05-22 @ 10:36)  RR: 18 (12-05-22 @ 10:36)  SpO2: 99% (12-05-22 @ 10:36)  Wt(kg): --    12-04 @ 07:01  -  12-05 @ 07:00  --------------------------------------------------------  IN: 900 mL / OUT: 0 mL / NET: 900 mL    12-05 @ 07:01  -  12-05 @ 14:28  --------------------------------------------------------  IN: 400 mL / OUT: 0 mL / NET: 400 mL          Physical Exam :-  Constitutional: NAD  Neck: Supple.  Respiratory: Bilateral equal breath sounds,  Cardiovascular: S1, S2 normal,  Gastrointestinal: Bowel Sounds present, soft, non tender.  Extremities: 1+ edema  Neurological: Alert and Oriented x 3, no focal deficits  Psychiatric: Normal mood, normal affect    LABS:  12-04    134<L>  |  93<L>  |  36<H>  ----------------------------<  184<H>  4.5   |  25  |  6.97<H>    Ca    9.4      04 Dec 2022 07:10  Phos  5.6     12-04  Mg     2.2     12-04      Creatinine Trend: 6.97 <--, 10.15 <--, 8.35 <--, 8.31 <--, 8.97 <--                        7.1    8.18  )-----------( 305      ( 05 Dec 2022 07:16 )             23.1     Urine Studies:      RADIOLOGY & ADDITIONAL STUDIES:

## 2022-12-05 NOTE — PROGRESS NOTE ADULT - ASSESSMENT
42M BMI 48, COPD on home oxygen, hypertensive ESRD, bipolar, schizophrenia.   Here 11/19 with sepsis, high grade MSSA bacteremia.   From left arm AV graft, excised 11/23.   Clearing 11/27 and ALBINO without vegetations but CT suggested pulmonary septic emboli.   Stable., afebrile, leukocytosis resolved.   s/p tunnelled HD catheter placement 12/2    Suggest  -monitor blood cultures   -Ancef 1GM IV q24h - for discharge dose three times weekly post HD 2GM, 2GM, 3GM through 1/8/23 for 6 weeks   -Can followup outpatient with Dr. Eduardo- for appointments can call [phone].    Osmany Bautista MD  Available through MS Teams  If no response, or after 5pm/weekends, call [phone]    Will sign off. Please call with questions.

## 2022-12-05 NOTE — PROGRESS NOTE ADULT - PROBLEM SELECTOR PLAN 6
APER -not on any home antihypertensives  -pressures here have been variable, if persistently hypertensive can start medications

## 2022-12-05 NOTE — PROGRESS NOTE ADULT - SUBJECTIVE AND OBJECTIVE BOX
Patient is a 42y old  Male who presents with a chief complaint of fevers and chills (05 Dec 2022 08:07)      SUBJECTIVE / OVERNIGHT EVENTS:  NO overnight events reported .  Patient offers no complaints .  NO BM as per patient , no abd pain and no nausea .      ADDITIONAL REVIEW OF SYSTEMS: Negative except for above    MEDICATIONS  (STANDING):  ceFAZolin   IVPB 1000 milliGRAM(s) IV Intermittent every 24 hours  chlorhexidine 4% Liquid 1 Application(s) Topical daily  epoetin cyndie-epbx (RETACRIT) Injectable 39131 Unit(s) IV Push <User Schedule>  guaiFENesin  milliGRAM(s) Oral every 12 hours  mirtazapine 15 milliGRAM(s) Oral at bedtime  PARoxetine 20 milliGRAM(s) Oral daily  polyethylene glycol 3350 17 Gram(s) Oral two times a day  QUEtiapine 300 milliGRAM(s) Oral at bedtime  risperiDONE   Tablet 2 milliGRAM(s) Oral daily  senna 2 Tablet(s) Oral at bedtime  sevelamer carbonate 800 milliGRAM(s) Oral three times a day with meals  simvastatin 40 milliGRAM(s) Oral at bedtime    MEDICATIONS  (PRN):  benzonatate 100 milliGRAM(s) Oral every 8 hours PRN Cough  HYDROmorphone  Injectable 1 milliGRAM(s) IV Push every 4 hours PRN Severe Pain (7 - 10)  sodium chloride 0.9% lock flush 10 milliLiter(s) IV Push every 1 hour PRN Pre/post blood products, medications, blood draw, and to maintain line patency      CAPILLARY BLOOD GLUCOSE        I&O's Summary    04 Dec 2022 07:01  -  05 Dec 2022 07:00  --------------------------------------------------------  IN: 900 mL / OUT: 0 mL / NET: 900 mL    05 Dec 2022 07:01  -  05 Dec 2022 13:57  --------------------------------------------------------  IN: 400 mL / OUT: 0 mL / NET: 400 mL        PHYSICAL EXAM:  Vital Signs Last 24 Hrs  T(C): 37 (05 Dec 2022 10:36), Max: 37.1 (04 Dec 2022 16:10)  T(F): 98.6 (05 Dec 2022 10:36), Max: 98.8 (04 Dec 2022 16:10)  HR: 79 (05 Dec 2022 10:36) (78 - 97)  BP: 144/76 (05 Dec 2022 10:36) (144/76 - 161/76)  BP(mean): --  RR: 18 (05 Dec 2022 10:36) (18 - 18)  SpO2: 99% (05 Dec 2022 10:36) (96% - 100%)    Parameters below as of 05 Dec 2022 10:36  Patient On (Oxygen Delivery Method): nasal cannula  O2 Flow (L/min): 3      PHYSICAL EXAM:  CONSTITUTIONAL: NAD, obese  EYES: PERRLA; conjunctiva and sclera clear  ENMT: MMM  NECK: Supple,  RESPIRATORY: Normal respiratory effort; CTAB  CARDIOVASCULAR: RRR, no JVD, no peripheral edema, +L avf site bandaged  ABDOMEN: Nontender to palpation, normoactive BS, no guarding/rigidity  MUSCLOSKELETAL:  no clubbing/cyanosis, no joint swelling or tenderness to palpation  PSYCH: A+O x 3, affect normal  SKIN:  Rt anterior chest Tunneled catheter , Lt upper ext Wound Vacc with brownish secretion       LABS:                        7.1    8.18  )-----------( 305      ( 05 Dec 2022 07:16 )             23.1     12-04    134<L>  |  93<L>  |  36<H>  ----------------------------<  184<H>  4.5   |  25  |  6.97<H>    Ca    9.4      04 Dec 2022 07:10  Phos  5.6     12-04  Mg     2.2     12-04                  RADIOLOGY & ADDITIONAL TESTS:    Imaging Personally Reviewed:    Electrocardiogram Personally Reviewed:    COORDINATION OF CARE:  Care Discussed with Consultants/Other Providers [Y/N]:  Prior or Outpatient Records Reviewed [Y/N]:

## 2022-12-05 NOTE — PROGRESS NOTE ADULT - SUBJECTIVE AND OBJECTIVE BOX
CC: Patient is a 42y old  Male who presents with a chief complaint of fevers and chills (05 Dec 2022 14:28)    ID following for MSSA bacteremia    Interval History/ROS: Patient remains with wound vac on left arm. s/p non tunnelled HD to tunnelled HD conversion on 12/2/2022.    Rest of ROS negative.    Allergies  aspirin (Swelling)  fish (Unknown)  penicillins (Swelling)  shellfish (Rash)    ANTIMICROBIALS:  ceFAZolin   IVPB 1000 every 24 hours    OTHER MEDS:  benzonatate 100 milliGRAM(s) Oral every 8 hours PRN  chlorhexidine 4% Liquid 1 Application(s) Topical daily  epoetin cyndie-epbx (RETACRIT) Injectable 33996 Unit(s) IV Push <User Schedule>  guaiFENesin  milliGRAM(s) Oral every 12 hours  HYDROmorphone  Injectable 1 milliGRAM(s) IV Push every 4 hours PRN  mirtazapine 15 milliGRAM(s) Oral at bedtime  PARoxetine 20 milliGRAM(s) Oral daily  polyethylene glycol 3350 17 Gram(s) Oral two times a day  QUEtiapine 300 milliGRAM(s) Oral at bedtime  risperiDONE   Tablet 2 milliGRAM(s) Oral daily  senna 2 Tablet(s) Oral at bedtime  sevelamer carbonate 800 milliGRAM(s) Oral three times a day with meals  simvastatin 40 milliGRAM(s) Oral at bedtime  sodium chloride 0.9% lock flush 10 milliLiter(s) IV Push every 1 hour PRN    PE:    Vital Signs Last 24 Hrs  T(C): 36.6 (05 Dec 2022 14:10), Max: 37.1 (04 Dec 2022 16:10)  T(F): 97.9 (05 Dec 2022 14:10), Max: 98.8 (04 Dec 2022 16:10)  HR: 76 (05 Dec 2022 14:10) (76 - 97)  BP: 162/88 (05 Dec 2022 14:10) (144/76 - 162/88)  BP(mean): --  RR: 18 (05 Dec 2022 14:10) (18 - 18)  SpO2: 99% (05 Dec 2022 14:10) (96% - 100%)    Parameters below as of 05 Dec 2022 14:10  Patient On (Oxygen Delivery Method): nasal cannula  O2 Flow (L/min): 3    Gen: AOx3, NAD  CV: S1+S2 normal, no murmurs  Resp: Clear bilat, no resp distress  Abd: Soft, nontender, +BS  Ext: No LE edema, no wounds  : No Garcia  IV/Skin: No thrombophlebitis, R chest tunnelled HD catheter intact, Left arm wound vac in place  Neuro: no focal deficits    LABS:                          7.1    8.18  )-----------( 305      ( 05 Dec 2022 07:16 )             23.1       12-04    134<L>  |  93<L>  |  36<H>  ----------------------------<  184<H>  4.5   |  25  |  6.97<H>    Ca    9.4      04 Dec 2022 07:10  Phos  5.6     12-04  Mg     2.2     12-04    MICROBIOLOGY:  v  .Blood Blood  11-29-22   No Growth Final  --  --      .Blood Blood  11-27-22   No Growth Final  --  --      .Blood Blood-Peripheral  11-24-22   Growth in aerobic and anaerobic bottles: Staphylococcus aureus  --  Staphylococcus aureus      .Blood Blood-Peripheral  11-24-22   No Growth Final  --  --      .Surgical Swab LEFT ARM INFECTED FISTULA  11-23-22   Few Staphylococcus aureus  --  Staphylococcus aureus      .Blood Blood-Peripheral  11-23-22   Growth in aerobic and anaerobic bottles: Staphylococcus aureus  See previous culture [id number]  --    Growth in aerobic bottle: Gram Positive Cocci in Clusters  Growth in anaerobic bottle: Gram Positive Cocci in Clusters      .Blood Blood-Peripheral  11-21-22   Growth in aerobic and anaerobic bottles: Staphylococcus aureus  ***Blood Panel PCR results on this specimen are available  approximately 3 hours after the Gram stain result.***  Gram stain, PCR, and/or culture results may not always  correspond dueto difference in methodologies.  ************************************************************  This PCR assay was performed by multiplex PCR. This  Assay tests for 66 bacterial and resistance gene targets.  Please refer to the [facility] [employer] test directory  at [email or web address] for details.  --  Blood Culture PCR  Staphylococcus aureus      .Blood Blood-Peripheral  11-19-22   Growth in aerobic and anaerobic bottles: Staphylococcus aureus  See previous culture [id number]  --    Growth in aerobic bottle: Gram Positive Cocci in Clusters  Growth in anaerobic bottle: Gram Positive Cocci in Clusters      .Blood Blood-Peripheral  11-19-22   Growth in aerobic and anaerobic bottles: Staphylococcus aureus  ***Blood Panel PCR results on this specimen are available  approximately 3 hours after the Gram stain result.***  Gram stain, PCR, and/or culture results may not always  correspond dueto difference in methodologies.  ************************************************************  This PCR assay was performed by multiplex PCR. This  Assay tests for 66 bacterial and resistance gene targets.  Please refer to the [facility] [employer] test directory  at [email or web address] for details.  --  Blood Culture PCR  Staphylococcus aureus    Rapid RVP Result: NotDetec (12-04 @ 22:45)  Rapid RVP Result: NotDetec (11-28 @ 18:10)    RADIOLOGY:    < from: Xray Abdomen 1 View PORTABLE -Urgent (Xray Abdomen 1 View PORTABLE -Urgent .) (12.02.22 @ 06:38) >  IMPRESSION:  Nonobstructive bowel gas pattern.      < end of copied text >

## 2022-12-06 LAB
ANION GAP SERPL CALC-SCNC: 15 MMOL/L — SIGNIFICANT CHANGE UP (ref 5–17)
BUN SERPL-MCNC: 32 MG/DL — HIGH (ref 7–23)
CALCIUM SERPL-MCNC: 9.7 MG/DL — SIGNIFICANT CHANGE UP (ref 8.4–10.5)
CHLORIDE SERPL-SCNC: 93 MMOL/L — LOW (ref 96–108)
CO2 SERPL-SCNC: 24 MMOL/L — SIGNIFICANT CHANGE UP (ref 22–31)
CREAT SERPL-MCNC: 6.72 MG/DL — HIGH (ref 0.5–1.3)
EGFR: 10 ML/MIN/1.73M2 — LOW
GLUCOSE SERPL-MCNC: 173 MG/DL — HIGH (ref 70–99)
HCT VFR BLD CALC: 23.4 % — LOW (ref 39–50)
HGB BLD-MCNC: 7.2 G/DL — LOW (ref 13–17)
MCHC RBC-ENTMCNC: 30.8 GM/DL — LOW (ref 32–36)
MCHC RBC-ENTMCNC: 31 PG — SIGNIFICANT CHANGE UP (ref 27–34)
MCV RBC AUTO: 100.9 FL — HIGH (ref 80–100)
NRBC # BLD: 0 /100 WBCS — SIGNIFICANT CHANGE UP (ref 0–0)
PLATELET # BLD AUTO: 323 K/UL — SIGNIFICANT CHANGE UP (ref 150–400)
POTASSIUM SERPL-MCNC: 4.8 MMOL/L — SIGNIFICANT CHANGE UP (ref 3.5–5.3)
POTASSIUM SERPL-SCNC: 4.8 MMOL/L — SIGNIFICANT CHANGE UP (ref 3.5–5.3)
RAPID RVP RESULT: SIGNIFICANT CHANGE UP
RBC # BLD: 2.32 M/UL — LOW (ref 4.2–5.8)
RBC # FLD: 15.3 % — HIGH (ref 10.3–14.5)
SARS-COV-2 RNA SPEC QL NAA+PROBE: SIGNIFICANT CHANGE UP
SODIUM SERPL-SCNC: 132 MMOL/L — LOW (ref 135–145)
WBC # BLD: 7.36 K/UL — SIGNIFICANT CHANGE UP (ref 3.8–10.5)
WBC # FLD AUTO: 7.36 K/UL — SIGNIFICANT CHANGE UP (ref 3.8–10.5)

## 2022-12-06 PROCEDURE — 99232 SBSQ HOSP IP/OBS MODERATE 35: CPT

## 2022-12-06 PROCEDURE — 71045 X-RAY EXAM CHEST 1 VIEW: CPT | Mod: 26

## 2022-12-06 RX ORDER — HEPARIN SODIUM 5000 [USP'U]/ML
5000 INJECTION INTRAVENOUS; SUBCUTANEOUS EVERY 12 HOURS
Refills: 0 | Status: DISCONTINUED | OUTPATIENT
Start: 2022-12-06 | End: 2022-12-09

## 2022-12-06 RX ORDER — ACETAMINOPHEN 500 MG
650 TABLET ORAL ONCE
Refills: 0 | Status: COMPLETED | OUTPATIENT
Start: 2022-12-06 | End: 2022-12-06

## 2022-12-06 RX ORDER — CEFAZOLIN SODIUM 1 G
3 VIAL (EA) INJECTION
Qty: 12 | Refills: 0
Start: 2022-12-06 | End: 2022-12-09

## 2022-12-06 RX ADMIN — HYDROMORPHONE HYDROCHLORIDE 1 MILLIGRAM(S): 2 INJECTION INTRAMUSCULAR; INTRAVENOUS; SUBCUTANEOUS at 22:51

## 2022-12-06 RX ADMIN — HYDROMORPHONE HYDROCHLORIDE 1 MILLIGRAM(S): 2 INJECTION INTRAMUSCULAR; INTRAVENOUS; SUBCUTANEOUS at 07:56

## 2022-12-06 RX ADMIN — HYDROMORPHONE HYDROCHLORIDE 1 MILLIGRAM(S): 2 INJECTION INTRAMUSCULAR; INTRAVENOUS; SUBCUTANEOUS at 23:20

## 2022-12-06 RX ADMIN — Medication 100 MILLIGRAM(S): at 22:51

## 2022-12-06 RX ADMIN — Medication 600 MILLIGRAM(S): at 08:01

## 2022-12-06 RX ADMIN — RISPERIDONE 2 MILLIGRAM(S): 4 TABLET ORAL at 12:14

## 2022-12-06 RX ADMIN — Medication 100 MILLIGRAM(S): at 22:52

## 2022-12-06 RX ADMIN — HYDROMORPHONE HYDROCHLORIDE 1 MILLIGRAM(S): 2 INJECTION INTRAMUSCULAR; INTRAVENOUS; SUBCUTANEOUS at 13:20

## 2022-12-06 RX ADMIN — SEVELAMER CARBONATE 800 MILLIGRAM(S): 2400 POWDER, FOR SUSPENSION ORAL at 17:47

## 2022-12-06 RX ADMIN — Medication 650 MILLIGRAM(S): at 18:10

## 2022-12-06 RX ADMIN — HYDROMORPHONE HYDROCHLORIDE 1 MILLIGRAM(S): 2 INJECTION INTRAMUSCULAR; INTRAVENOUS; SUBCUTANEOUS at 12:51

## 2022-12-06 RX ADMIN — SEVELAMER CARBONATE 800 MILLIGRAM(S): 2400 POWDER, FOR SUSPENSION ORAL at 08:01

## 2022-12-06 RX ADMIN — Medication 20 MILLIGRAM(S): at 12:14

## 2022-12-06 RX ADMIN — Medication 650 MILLIGRAM(S): at 17:47

## 2022-12-06 RX ADMIN — HYDROMORPHONE HYDROCHLORIDE 1 MILLIGRAM(S): 2 INJECTION INTRAMUSCULAR; INTRAVENOUS; SUBCUTANEOUS at 17:58

## 2022-12-06 RX ADMIN — SEVELAMER CARBONATE 800 MILLIGRAM(S): 2400 POWDER, FOR SUSPENSION ORAL at 12:13

## 2022-12-06 RX ADMIN — CHLORHEXIDINE GLUCONATE 1 APPLICATION(S): 213 SOLUTION TOPICAL at 12:13

## 2022-12-06 RX ADMIN — MIRTAZAPINE 15 MILLIGRAM(S): 45 TABLET, ORALLY DISINTEGRATING ORAL at 22:52

## 2022-12-06 RX ADMIN — Medication 600 MILLIGRAM(S): at 20:46

## 2022-12-06 RX ADMIN — POLYETHYLENE GLYCOL 3350 17 GRAM(S): 17 POWDER, FOR SOLUTION ORAL at 17:48

## 2022-12-06 RX ADMIN — HYDROMORPHONE HYDROCHLORIDE 1 MILLIGRAM(S): 2 INJECTION INTRAMUSCULAR; INTRAVENOUS; SUBCUTANEOUS at 08:30

## 2022-12-06 RX ADMIN — SIMVASTATIN 40 MILLIGRAM(S): 20 TABLET, FILM COATED ORAL at 22:51

## 2022-12-06 RX ADMIN — SENNA PLUS 2 TABLET(S): 8.6 TABLET ORAL at 22:52

## 2022-12-06 RX ADMIN — HYDROMORPHONE HYDROCHLORIDE 1 MILLIGRAM(S): 2 INJECTION INTRAMUSCULAR; INTRAVENOUS; SUBCUTANEOUS at 18:10

## 2022-12-06 RX ADMIN — QUETIAPINE FUMARATE 300 MILLIGRAM(S): 200 TABLET, FILM COATED ORAL at 22:52

## 2022-12-06 NOTE — PROVIDER CONTACT NOTE (OTHER) - SITUATION
Patient is c/o of severe chest pain with inspirations - making it difficulty for the patient to breath. Patient states th chest pain is 8/10, throbbing and nonradiating.
pt with large episode of projectile vomiting
Pt's oral temp 101.9
patient states he doesn't want his blood drawn at this time. maybe later will be better
Pt in pain, requesting provider to assess at bedside
/95, pulse 101
Pt hallucinating
Pt is febrile

## 2022-12-06 NOTE — PROGRESS NOTE ADULT - ASSESSMENT
42-year-old male with past medical history ESRD on HD (at home, M to F via AV graft), COPD on home oxygen 4L, bipolar disorder, schizophrenia presents with fever and chills x1 day. Patient states he had a fever of up to 104 at home and in [place] ED was Tmax 100.6    ESRD on HD  Infected access, MSSA bacteremia, Septic Pulm emboli  S/P AVG excision 11/23/22  On Cefazolin per Infectious disease specialist  s/p Permacath placement by IR  s/p HD yesterday- flowsheet reviewed- tolerated HD well  hold off avf for now after d/w vascular--> avf outpt  Outpt HD set up at [facility]  Renal diet  Daily BMP    HTN  UF on HD  overall bp better    Anemia of CKD  Epo during HD  20k tiw with hd  trend hgb    BMD of CKD  Hyperphosphatemia- phos is 5.6  renvela with meals  trend etta phos    Hyponatremia  likely volume related  UF on HD  trend na      Dr Mauricio  [phone]

## 2022-12-06 NOTE — PROGRESS NOTE ADULT - ASSESSMENT
42-year-old male with past medical history ESRD on HD (at home, M to F via AV graft), COPD on home oxygen 4L, bipolar disorder, schizophrenia presented to [place] fever and chills x1 day. Transferred to [facility] for continued MSSA bacteremia management and further imaging, now s/p LUE AV graft excision 11/23 now s/p ALBINO without vegetation, now s/p shiley to permacath conversion.

## 2022-12-06 NOTE — PROGRESS NOTE ADULT - PROBLEM SELECTOR PLAN 4
-per pt productive cough started on 11/23  -c/w PRN guaifenesin, tessalon perles   -likely etiology of patient's costochondritis on his R rib  -CXR showed no consolidations  -incentive spirometry

## 2022-12-06 NOTE — PROGRESS NOTE ADULT - SUBJECTIVE AND OBJECTIVE BOX
Patient seen and examined  no complaints    aspirin (Swelling)  fish (Unknown)  penicillins (Swelling)  shellfish (Rash)    Hospital Medications:   MEDICATIONS  (STANDING):  ceFAZolin   IVPB 1000 milliGRAM(s) IV Intermittent every 24 hours  chlorhexidine 4% Liquid 1 Application(s) Topical daily  epoetin cyndie-epbx (RETACRIT) Injectable 80476 Unit(s) IV Push <User Schedule>  guaiFENesin  milliGRAM(s) Oral every 12 hours  mirtazapine 15 milliGRAM(s) Oral at bedtime  PARoxetine 20 milliGRAM(s) Oral daily  polyethylene glycol 3350 17 Gram(s) Oral two times a day  QUEtiapine 300 milliGRAM(s) Oral at bedtime  risperiDONE   Tablet 2 milliGRAM(s) Oral daily  senna 2 Tablet(s) Oral at bedtime  sevelamer carbonate 800 milliGRAM(s) Oral three times a day with meals  simvastatin 40 milliGRAM(s) Oral at bedtime      VITALS:  T(F): 99.3 (12-06-22 @ 13:49), Max: 99.8 (12-05-22 @ 22:30)  HR: 79 (12-06-22 @ 13:49)  BP: 157/93 (12-06-22 @ 13:49)  RR: 18 (12-06-22 @ 13:49)  SpO2: 99% (12-06-22 @ 13:49)  Wt(kg): --    12-05 @ 07:01  -  12-06 @ 07:00  --------------------------------------------------------  IN: 2570 mL / OUT: 3600 mL / NET: -1030 mL    12-06 @ 07:01  -  12-06 @ 14:00  --------------------------------------------------------  IN: 650 mL / OUT: 0 mL / NET: 650 mL      Physical Exam :-  Constitutional: NAD  Neck: Supple.  Respiratory: Bilateral equal breath sounds,  Cardiovascular: S1, S2 normal,  Gastrointestinal: Bowel Sounds present, soft, non tender.  Extremities: 1+ edema  Neurological: Alert and Oriented x 3, no focal deficits  Psychiatric: Normal mood, normal affect  LABS:  12-06    132<L>  |  93<L>  |  32<H>  ----------------------------<  173<H>  4.8   |  24  |  6.72<H>    Ca    9.7      06 Dec 2022 07:45      Creatinine Trend: 6.72 <--, 6.97 <--, 10.15 <--, 8.35 <--, 8.31 <--                        7.2    7.36  )-----------( 323      ( 06 Dec 2022 07:45 )             23.4     Urine Studies:      RADIOLOGY & ADDITIONAL STUDIES:

## 2022-12-06 NOTE — PROVIDER CONTACT NOTE (OTHER) - ASSESSMENT
/89, RR 18, HR 84, O2sat 99% on 2LNC
/95, pulse 101. other vss. pt asymptomatic
Pt reports that he woke up from sleep and felt he needed to vomit. Large volume of green emesis noted. VSS, no c/o of CP or SOB
Patient is alert and oriented x 4. VSS - O2 is 99% on 3L NC.
Pt is asymptomatic
RN called in by neighboring patient who stated that the patient (Nakita) was shouting for help. Upon initial observation, patient appeared asleep and snoring. RN asked patient if he was awake and he stated yes. RN asked patient what is wrong and he stated, with his eyes still closed, that he sees RN's face. RN asked patient to open his eyes and he stated that I wouldn't let him. RN forced patient's eye lids open and patient able to follow RN's finger. Pt maintaining his eyes open and stating he sees a red flashing light, despite there not being one. RN asked patient if he feels ok otherwise and he said yes. After the conversation with the patient, pt stated he no longer was seeing things.

## 2022-12-06 NOTE — PROGRESS NOTE ADULT - PROBLEM SELECTOR PLAN 1
STABLE NOW ---> SPike fever on 12/6  Found to have MSSA bacteremia 2/2 possible AVF infection d/t noted purulence. Transferred to [facility] for further imaging. S/p empiric treatment with vanc (11/19) and cefepime (11/19). Continued on ancef. Leukocytosis improved. LUE negative for findings of DVT.     -blood culture - MSSA, cultures 11/24 positive,  FIRST NEGATIVE blood culture on 11/27.    AS per ID : Ancef 1GM IV q24h - for discharge dose three times weekly post HD 2GM, 2GM, 3GM through 1/8/23 for 6 weeks  - s/p R IJ shiley placed 11/21- now clotted- now s/p new shiley--> S/P Tunneled catheter by IR on 12/2  - s/p LUE graft excision on 11/23 now on wound vac -----> vasc surgery was contacted and they want pt to be dc with wound vac and will f/up with vascular surg oupt .  ID is ok with using the HD cathter for ABX .    - TTE unchanged from 2019, ALBINO no vegetations    FEVER WOrk up due to spike on 12/6 ---> ID f/p on 12/7

## 2022-12-06 NOTE — PROVIDER CONTACT NOTE (OTHER) - REASON
Pt hallucinating
/95, pulse 101
patient refusing morning labs
Pt in pain, requesting provider to assess at bedside
Temperature 100.3
pt with large episode of projectile vomiting
Pt is febrile
Patient is c/o of chest pain and difficulty breathing

## 2022-12-06 NOTE — PROGRESS NOTE ADULT - SUBJECTIVE AND OBJECTIVE BOX
Patient is a 42y old  Male who presents with a chief complaint of fevers and chills (06 Dec 2022 14:00)      SUBJECTIVE / OVERNIGHT EVENTS:    Tele reviewed:       ADDITIONAL REVIEW OF SYSTEMS: Negative except for above    MEDICATIONS  (STANDING):  ceFAZolin   IVPB 1000 milliGRAM(s) IV Intermittent every 24 hours  chlorhexidine 4% Liquid 1 Application(s) Topical daily  epoetin cyndie-epbx (RETACRIT) Injectable 55834 Unit(s) IV Push <User Schedule>  guaiFENesin  milliGRAM(s) Oral every 12 hours  mirtazapine 15 milliGRAM(s) Oral at bedtime  PARoxetine 20 milliGRAM(s) Oral daily  polyethylene glycol 3350 17 Gram(s) Oral two times a day  QUEtiapine 300 milliGRAM(s) Oral at bedtime  risperiDONE   Tablet 2 milliGRAM(s) Oral daily  senna 2 Tablet(s) Oral at bedtime  sevelamer carbonate 800 milliGRAM(s) Oral three times a day with meals  simvastatin 40 milliGRAM(s) Oral at bedtime    MEDICATIONS  (PRN):  benzonatate 100 milliGRAM(s) Oral every 8 hours PRN Cough  HYDROmorphone  Injectable 1 milliGRAM(s) IV Push every 4 hours PRN Severe Pain (7 - 10)  sodium chloride 0.9% lock flush 10 milliLiter(s) IV Push every 1 hour PRN Pre/post blood products, medications, blood draw, and to maintain line patency      CAPILLARY BLOOD GLUCOSE        I&O's Summary    05 Dec 2022 07:01  -  06 Dec 2022 07:00  --------------------------------------------------------  IN: 2570 mL / OUT: 3600 mL / NET: -1030 mL    06 Dec 2022 07:01  -  06 Dec 2022 15:28  --------------------------------------------------------  IN: 650 mL / OUT: 0 mL / NET: 650 mL        PHYSICAL EXAM:  Vital Signs Last 24 Hrs  T(C): 37.4 (06 Dec 2022 13:49), Max: 37.7 (05 Dec 2022 22:30)  T(F): 99.3 (06 Dec 2022 13:49), Max: 99.8 (05 Dec 2022 22:30)  HR: 79 (06 Dec 2022 13:49) (79 - 96)  BP: 157/93 (06 Dec 2022 13:49) (134/78 - 161/94)  BP(mean): --  RR: 18 (06 Dec 2022 13:49) (18 - 18)  SpO2: 99% (06 Dec 2022 13:49) (97% - 100%)    Parameters below as of 06 Dec 2022 13:49  Patient On (Oxygen Delivery Method): nasal cannula  O2 Flow (L/min): 2      PHYSICAL EXAM:      LABS:                        7.2    7.36  )-----------( 323      ( 06 Dec 2022 07:45 )             23.4     12-06    132<L>  |  93<L>  |  32<H>  ----------------------------<  173<H>  4.8   |  24  |  6.72<H>    Ca    9.7      06 Dec 2022 07:45                  RADIOLOGY & ADDITIONAL TESTS:    Imaging Personally Reviewed:    Electrocardiogram Personally Reviewed:    COORDINATION OF CARE:  Care Discussed with Consultants/Other Providers [Y/N]:  Prior or Outpatient Records Reviewed [Y/N]:     Patient is a 42y old  Male who presents with a chief complaint of fevers and chills (06 Dec 2022 14:00)      SUBJECTIVE / OVERNIGHT EVENTS:   No complaints.  Pt refused rehad       ADDITIONAL REVIEW OF SYSTEMS: Negative except for above    MEDICATIONS  (STANDING):  ceFAZolin   IVPB 1000 milliGRAM(s) IV Intermittent every 24 hours  chlorhexidine 4% Liquid 1 Application(s) Topical daily  epoetin cyndie-epbx (RETACRIT) Injectable 04961 Unit(s) IV Push <User Schedule>  guaiFENesin  milliGRAM(s) Oral every 12 hours  mirtazapine 15 milliGRAM(s) Oral at bedtime  PARoxetine 20 milliGRAM(s) Oral daily  polyethylene glycol 3350 17 Gram(s) Oral two times a day  QUEtiapine 300 milliGRAM(s) Oral at bedtime  risperiDONE   Tablet 2 milliGRAM(s) Oral daily  senna 2 Tablet(s) Oral at bedtime  sevelamer carbonate 800 milliGRAM(s) Oral three times a day with meals  simvastatin 40 milliGRAM(s) Oral at bedtime    MEDICATIONS  (PRN):  benzonatate 100 milliGRAM(s) Oral every 8 hours PRN Cough  HYDROmorphone  Injectable 1 milliGRAM(s) IV Push every 4 hours PRN Severe Pain (7 - 10)  sodium chloride 0.9% lock flush 10 milliLiter(s) IV Push every 1 hour PRN Pre/post blood products, medications, blood draw, and to maintain line patency      CAPILLARY BLOOD GLUCOSE        I&O's Summary    05 Dec 2022 07:01  -  06 Dec 2022 07:00  --------------------------------------------------------  IN: 2570 mL / OUT: 3600 mL / NET: -1030 mL    06 Dec 2022 07:01  -  06 Dec 2022 15:28  --------------------------------------------------------  IN: 650 mL / OUT: 0 mL / NET: 650 mL        PHYSICAL EXAM:  Vital Signs Last 24 Hrs  T(C): 37.4 (06 Dec 2022 13:49), Max: 37.7 (05 Dec 2022 22:30)  T(F): 99.3 (06 Dec 2022 13:49), Max: 99.8 (05 Dec 2022 22:30)  HR: 79 (06 Dec 2022 13:49) (79 - 96)  BP: 157/93 (06 Dec 2022 13:49) (134/78 - 161/94)  BP(mean): --  RR: 18 (06 Dec 2022 13:49) (18 - 18)  SpO2: 99% (06 Dec 2022 13:49) (97% - 100%)    Parameters below as of 06 Dec 2022 13:49  Patient On (Oxygen Delivery Method): nasal cannula  O2 Flow (L/min): 2      PHYSICAL EXAM:  CONSTITUTIONAL: NAD, obese  EYES: PERRLA; conjunctiva and sclera clear  ENMT: MMM  NECK: Supple,  RESPIRATORY: Normal respiratory effort; CTAB  CARDIOVASCULAR: RRR, no JVD, no peripheral edema, +L avf site bandaged  ABDOMEN: Nontender to palpation, normoactive BS, no guarding/rigidity  MUSCLOSKELETAL:  no clubbing/cyanosis, no joint swelling or tenderness to palpation  PSYCH: A+O x 3, affect normal  SKIN:  Rt anterior chest Tunneled catheter , Lt upper ext Wound Vacc with brownish secretion     LABS:                        7.2    7.36  )-----------( 323      ( 06 Dec 2022 07:45 )             23.4     12-06    132<L>  |  93<L>  |  32<H>  ----------------------------<  173<H>  4.8   |  24  |  6.72<H>    Ca    9.7      06 Dec 2022 07:45                  RADIOLOGY & ADDITIONAL TESTS:    Imaging Personally Reviewed:    Electrocardiogram Personally Reviewed:    COORDINATION OF CARE:  Care Discussed with Consultants/Other Providers [Y/N]:  Prior or Outpatient Records Reviewed [Y/N]:

## 2022-12-06 NOTE — PROVIDER CONTACT NOTE (OTHER) - ACTION/TREATMENT ORDERED:
MD Thompson made aware. will attempt labs again later.
Tylenol
IV zofran given for nausea
Provider made aware, pending orders for IV pain medications, will assess pt at bedside
Tylenol administered as ordered.
Will assess patient at bedside.
Provider came to patient's bedside. As per provider, MD Nichols, administered nebulizer treatment and mucinex - perform EKG. Cxray ordered. Will continue to monitor.
no new orders

## 2022-12-06 NOTE — CHART NOTE - NSCHARTNOTEFT_GEN_A_CORE
42-year-old male with past medical history ESRD on HD (at home, M to F via AV graft), COPD on home oxygen 4L, bipolar disorder, schizophrenia presented to [place] fever and chills x1 day. Transferred to [facility] for continued MSSA bacteremia management and further imaging, now s/p LUE AV graft excision 11/23 now s/p ALBINO without vegetation, now s/p shiley to permacath conversion awaiting transition to [facility] with HD    ICU Vital Signs Last 24 Hrs  T(C): 36.5 (06 Dec 2022 18:55), Max: 38.8 (06 Dec 2022 16:58)  T(F): 97.7 (06 Dec 2022 18:55), Max: 101.9 (06 Dec 2022 16:58)  HR: 77 (06 Dec 2022 18:55) (77 - 96)  BP: 161/89 (06 Dec 2022 16:58) (134/78 - 161/94)  BP(mean): --  ABP: --  ABP(mean): --  RR: 18 (06 Dec 2022 16:58) (18 - 18)  SpO2: 99% (06 Dec 2022 16:58) (97% - 99%)    O2 Parameters below as of 06 Dec 2022 16:58  Patient On (Oxygen Delivery Method): nasal cannula        D/w Dr. Flory Albrecht  Tylenol  CXR  Bcx  Ua  Ucx  RVP  continue on Cefazolin         will continue to monitor  will endorse to night team      Clara Ruiz NP]  Internal Medicine

## 2022-12-06 NOTE — PROGRESS NOTE ADULT - PROBLEM SELECTOR PLAN 3
Patient complete dialysis at home M/Tue/off Wed/Thur/ off Fri/ Sat/ Sun. AVF in place (prior hx of aneurysm per [facility] records)  - s/p permacath placement by IR 12/2  - renal attg to discuss with vascular about placing new RUE AVF as inpatient  -HD per nephro

## 2022-12-06 NOTE — PROVIDER CONTACT NOTE (OTHER) - DATE AND TIME:
30-Nov-2022 23:00
04-Dec-2022 05:20
28-Nov-2022 00:16
06-Dec-2022 17:00
23-Nov-2022 06:48
26-Nov-2022 09:00
24-Nov-2022 12:07
02-Dec-2022 04:00

## 2022-12-06 NOTE — CHART NOTE - NSCHARTNOTESELECT_GEN_ALL_CORE
Chart Note
Event Note
Left  Knee  pain/Event Note
Event Note
Post Op Check/Event Note
Temperature/Event Note
tert exam/Event Note

## 2022-12-07 LAB
ANION GAP SERPL CALC-SCNC: 17 MMOL/L — SIGNIFICANT CHANGE UP (ref 5–17)
BASOPHILS # BLD AUTO: 0 K/UL — SIGNIFICANT CHANGE UP (ref 0–0.2)
BASOPHILS NFR BLD AUTO: 0 % — SIGNIFICANT CHANGE UP (ref 0–2)
BLD GP AB SCN SERPL QL: NEGATIVE — SIGNIFICANT CHANGE UP
BUN SERPL-MCNC: 45 MG/DL — HIGH (ref 7–23)
CALCIUM SERPL-MCNC: 9.8 MG/DL — SIGNIFICANT CHANGE UP (ref 8.4–10.5)
CHLORIDE SERPL-SCNC: 92 MMOL/L — LOW (ref 96–108)
CO2 SERPL-SCNC: 23 MMOL/L — SIGNIFICANT CHANGE UP (ref 22–31)
CREAT SERPL-MCNC: 8.8 MG/DL — HIGH (ref 0.5–1.3)
EGFR: 7 ML/MIN/1.73M2 — LOW
EOSINOPHIL # BLD AUTO: 0 K/UL — SIGNIFICANT CHANGE UP (ref 0–0.5)
EOSINOPHIL NFR BLD AUTO: 0 % — SIGNIFICANT CHANGE UP (ref 0–6)
GLUCOSE SERPL-MCNC: 141 MG/DL — HIGH (ref 70–99)
HCT VFR BLD CALC: 22.9 % — LOW (ref 39–50)
HCT VFR BLD CALC: 26.3 % — LOW (ref 39–50)
HGB BLD-MCNC: 7 G/DL — CRITICAL LOW (ref 13–17)
HGB BLD-MCNC: 8 G/DL — LOW (ref 13–17)
IRON SATN MFR SERPL: 20 % — SIGNIFICANT CHANGE UP (ref 16–55)
IRON SATN MFR SERPL: 38 UG/DL — LOW (ref 45–165)
LYMPHOCYTES # BLD AUTO: 0.44 K/UL — LOW (ref 1–3.3)
LYMPHOCYTES # BLD AUTO: 6 % — LOW (ref 13–44)
MCHC RBC-ENTMCNC: 30.4 GM/DL — LOW (ref 32–36)
MCHC RBC-ENTMCNC: 30.6 GM/DL — LOW (ref 32–36)
MCHC RBC-ENTMCNC: 30.7 PG — SIGNIFICANT CHANGE UP (ref 27–34)
MCHC RBC-ENTMCNC: 30.8 PG — SIGNIFICANT CHANGE UP (ref 27–34)
MCV RBC AUTO: 100.8 FL — HIGH (ref 80–100)
MCV RBC AUTO: 100.9 FL — HIGH (ref 80–100)
MONOCYTES # BLD AUTO: 0.63 K/UL — SIGNIFICANT CHANGE UP (ref 0–0.9)
MONOCYTES NFR BLD AUTO: 8.6 % — SIGNIFICANT CHANGE UP (ref 2–14)
NEUTROPHILS # BLD AUTO: 6.28 K/UL — SIGNIFICANT CHANGE UP (ref 1.8–7.4)
NEUTROPHILS NFR BLD AUTO: 85.4 % — HIGH (ref 43–77)
NRBC # BLD: 0 /100 WBCS — SIGNIFICANT CHANGE UP (ref 0–0)
PLATELET # BLD AUTO: 309 K/UL — SIGNIFICANT CHANGE UP (ref 150–400)
PLATELET # BLD AUTO: 344 K/UL — SIGNIFICANT CHANGE UP (ref 150–400)
POTASSIUM SERPL-MCNC: 4.7 MMOL/L — SIGNIFICANT CHANGE UP (ref 3.5–5.3)
POTASSIUM SERPL-SCNC: 4.7 MMOL/L — SIGNIFICANT CHANGE UP (ref 3.5–5.3)
RBC # BLD: 2.27 M/UL — LOW (ref 4.2–5.8)
RBC # BLD: 2.61 M/UL — LOW (ref 4.2–5.8)
RBC # FLD: 15.1 % — HIGH (ref 10.3–14.5)
RBC # FLD: 15.1 % — HIGH (ref 10.3–14.5)
RH IG SCN BLD-IMP: NEGATIVE — SIGNIFICANT CHANGE UP
SODIUM SERPL-SCNC: 132 MMOL/L — LOW (ref 135–145)
TIBC SERPL-MCNC: 190 UG/DL — LOW (ref 220–430)
UIBC SERPL-MCNC: 152 UG/DL — SIGNIFICANT CHANGE UP (ref 110–370)
WBC # BLD: 6.96 K/UL — SIGNIFICANT CHANGE UP (ref 3.8–10.5)
WBC # BLD: 7.35 K/UL — SIGNIFICANT CHANGE UP (ref 3.8–10.5)
WBC # FLD AUTO: 6.96 K/UL — SIGNIFICANT CHANGE UP (ref 3.8–10.5)
WBC # FLD AUTO: 7.35 K/UL — SIGNIFICANT CHANGE UP (ref 3.8–10.5)

## 2022-12-07 PROCEDURE — 99232 SBSQ HOSP IP/OBS MODERATE 35: CPT

## 2022-12-07 PROCEDURE — 99233 SBSQ HOSP IP/OBS HIGH 50: CPT

## 2022-12-07 RX ORDER — HYDROMORPHONE HYDROCHLORIDE 2 MG/ML
0.5 INJECTION INTRAMUSCULAR; INTRAVENOUS; SUBCUTANEOUS ONCE
Refills: 0 | Status: DISCONTINUED | OUTPATIENT
Start: 2022-12-07 | End: 2022-12-07

## 2022-12-07 RX ORDER — HYDROMORPHONE HYDROCHLORIDE 2 MG/ML
2 INJECTION INTRAMUSCULAR; INTRAVENOUS; SUBCUTANEOUS EVERY 4 HOURS
Refills: 0 | Status: DISCONTINUED | OUTPATIENT
Start: 2022-12-07 | End: 2022-12-09

## 2022-12-07 RX ORDER — LACTULOSE 10 G/15ML
10 SOLUTION ORAL ONCE
Refills: 0 | Status: COMPLETED | OUTPATIENT
Start: 2022-12-07 | End: 2022-12-07

## 2022-12-07 RX ORDER — HYDROMORPHONE HYDROCHLORIDE 2 MG/ML
2 INJECTION INTRAMUSCULAR; INTRAVENOUS; SUBCUTANEOUS EVERY 4 HOURS
Refills: 0 | Status: DISCONTINUED | OUTPATIENT
Start: 2022-12-07 | End: 2022-12-07

## 2022-12-07 RX ORDER — ACETAMINOPHEN 500 MG
650 TABLET ORAL ONCE
Refills: 0 | Status: COMPLETED | OUTPATIENT
Start: 2022-12-07 | End: 2022-12-07

## 2022-12-07 RX ORDER — NALOXEGOL OXALATE 12.5 MG/1
12.5 TABLET, FILM COATED ORAL DAILY
Refills: 0 | Status: DISCONTINUED | OUTPATIENT
Start: 2022-12-07 | End: 2022-12-09

## 2022-12-07 RX ADMIN — POLYETHYLENE GLYCOL 3350 17 GRAM(S): 17 POWDER, FOR SOLUTION ORAL at 17:12

## 2022-12-07 RX ADMIN — MIRTAZAPINE 15 MILLIGRAM(S): 45 TABLET, ORALLY DISINTEGRATING ORAL at 21:37

## 2022-12-07 RX ADMIN — SEVELAMER CARBONATE 800 MILLIGRAM(S): 2400 POWDER, FOR SUSPENSION ORAL at 13:39

## 2022-12-07 RX ADMIN — CHLORHEXIDINE GLUCONATE 1 APPLICATION(S): 213 SOLUTION TOPICAL at 13:39

## 2022-12-07 RX ADMIN — HYDROMORPHONE HYDROCHLORIDE 1 MILLIGRAM(S): 2 INJECTION INTRAMUSCULAR; INTRAVENOUS; SUBCUTANEOUS at 08:16

## 2022-12-07 RX ADMIN — SEVELAMER CARBONATE 800 MILLIGRAM(S): 2400 POWDER, FOR SUSPENSION ORAL at 17:13

## 2022-12-07 RX ADMIN — Medication 100 MILLIGRAM(S): at 22:49

## 2022-12-07 RX ADMIN — LACTULOSE 10 GRAM(S): 10 SOLUTION ORAL at 17:59

## 2022-12-07 RX ADMIN — SEVELAMER CARBONATE 800 MILLIGRAM(S): 2400 POWDER, FOR SUSPENSION ORAL at 07:46

## 2022-12-07 RX ADMIN — HYDROMORPHONE HYDROCHLORIDE 0.5 MILLIGRAM(S): 2 INJECTION INTRAMUSCULAR; INTRAVENOUS; SUBCUTANEOUS at 15:45

## 2022-12-07 RX ADMIN — HYDROMORPHONE HYDROCHLORIDE 1 MILLIGRAM(S): 2 INJECTION INTRAMUSCULAR; INTRAVENOUS; SUBCUTANEOUS at 12:25

## 2022-12-07 RX ADMIN — HYDROMORPHONE HYDROCHLORIDE 1 MILLIGRAM(S): 2 INJECTION INTRAMUSCULAR; INTRAVENOUS; SUBCUTANEOUS at 17:14

## 2022-12-07 RX ADMIN — HYDROMORPHONE HYDROCHLORIDE 1 MILLIGRAM(S): 2 INJECTION INTRAMUSCULAR; INTRAVENOUS; SUBCUTANEOUS at 12:01

## 2022-12-07 RX ADMIN — Medication 600 MILLIGRAM(S): at 07:46

## 2022-12-07 RX ADMIN — HEPARIN SODIUM 5000 UNIT(S): 5000 INJECTION INTRAVENOUS; SUBCUTANEOUS at 05:18

## 2022-12-07 RX ADMIN — HYDROMORPHONE HYDROCHLORIDE 1 MILLIGRAM(S): 2 INJECTION INTRAMUSCULAR; INTRAVENOUS; SUBCUTANEOUS at 02:46

## 2022-12-07 RX ADMIN — RISPERIDONE 2 MILLIGRAM(S): 4 TABLET ORAL at 13:39

## 2022-12-07 RX ADMIN — HYDROMORPHONE HYDROCHLORIDE 0.5 MILLIGRAM(S): 2 INJECTION INTRAMUSCULAR; INTRAVENOUS; SUBCUTANEOUS at 15:16

## 2022-12-07 RX ADMIN — HYDROMORPHONE HYDROCHLORIDE 2 MILLIGRAM(S): 2 INJECTION INTRAMUSCULAR; INTRAVENOUS; SUBCUTANEOUS at 22:33

## 2022-12-07 RX ADMIN — ERYTHROPOIETIN 20000 UNIT(S): 10000 INJECTION, SOLUTION INTRAVENOUS; SUBCUTANEOUS at 11:20

## 2022-12-07 RX ADMIN — Medication 20 MILLIGRAM(S): at 13:39

## 2022-12-07 RX ADMIN — HEPARIN SODIUM 5000 UNIT(S): 5000 INJECTION INTRAVENOUS; SUBCUTANEOUS at 17:13

## 2022-12-07 RX ADMIN — POLYETHYLENE GLYCOL 3350 17 GRAM(S): 17 POWDER, FOR SOLUTION ORAL at 05:18

## 2022-12-07 RX ADMIN — SIMVASTATIN 40 MILLIGRAM(S): 20 TABLET, FILM COATED ORAL at 21:38

## 2022-12-07 RX ADMIN — HYDROMORPHONE HYDROCHLORIDE 1 MILLIGRAM(S): 2 INJECTION INTRAMUSCULAR; INTRAVENOUS; SUBCUTANEOUS at 17:44

## 2022-12-07 RX ADMIN — QUETIAPINE FUMARATE 300 MILLIGRAM(S): 200 TABLET, FILM COATED ORAL at 21:38

## 2022-12-07 RX ADMIN — HYDROMORPHONE HYDROCHLORIDE 0.5 MILLIGRAM(S): 2 INJECTION INTRAMUSCULAR; INTRAVENOUS; SUBCUTANEOUS at 23:36

## 2022-12-07 RX ADMIN — HYDROMORPHONE HYDROCHLORIDE 1 MILLIGRAM(S): 2 INJECTION INTRAMUSCULAR; INTRAVENOUS; SUBCUTANEOUS at 03:20

## 2022-12-07 RX ADMIN — Medication 600 MILLIGRAM(S): at 22:45

## 2022-12-07 RX ADMIN — HYDROMORPHONE HYDROCHLORIDE 2 MILLIGRAM(S): 2 INJECTION INTRAMUSCULAR; INTRAVENOUS; SUBCUTANEOUS at 22:03

## 2022-12-07 RX ADMIN — HYDROMORPHONE HYDROCHLORIDE 1 MILLIGRAM(S): 2 INJECTION INTRAMUSCULAR; INTRAVENOUS; SUBCUTANEOUS at 07:46

## 2022-12-07 NOTE — PROGRESS NOTE ADULT - ASSESSMENT
42M BMI 48, COPD on home oxygen, hypertensive ESRD, bipolar, schizophrenia.   Here 11/19 with sepsis, high grade MSSA bacteremia.   From left arm AV graft, excised 11/23.   Clearing 11/27 and ALBINO without vegetations but CT suggested pulmonary septic emboli.   Stable., afebrile, leukocytosis resolved.   s/p tunnelled HD catheter placement 12/2  Febrile 12/6, RVP negative, CXR negative    Suggest  -F/U repeat blood cultures   -Ancef 1GM IV q24h - for discharge dose three times weekly post HD 2GM, 2GM, 3GM through 1/8/23 for 6 weeks   -Can followup outpatient with Dr. Eduardo- for appointments can call [phone].    Osmany Bautista MD  Available through MS Teams  If no response, or after 5pm/weekends, call [phone]

## 2022-12-07 NOTE — PROGRESS NOTE ADULT - SUBJECTIVE AND OBJECTIVE BOX
CC: Patient is a 42y old  Male who presents with a chief complaint of fevers and chills (07 Dec 2022 13:59)    ID following for fever    Interval History/ROS: Patient with fever yesterday afternoon, now afebrile. Feels well. Has no new complaints. Remains on 2L NC.    Rest of ROS negative.    Allergies  aspirin (Swelling)  fish (Unknown)  penicillins (Swelling)  shellfish (Rash)    ANTIMICROBIALS:  ceFAZolin   IVPB 1000 every 24 hours    OTHER MEDS:  benzonatate 100 milliGRAM(s) Oral every 8 hours PRN  chlorhexidine 4% Liquid 1 Application(s) Topical daily  epoetin cyndie-epbx (RETACRIT) Injectable 19860 Unit(s) IV Push <User Schedule>  guaiFENesin  milliGRAM(s) Oral every 12 hours  heparin   Injectable 5000 Unit(s) SubCutaneous every 12 hours  HYDROmorphone  Injectable 1 milliGRAM(s) IV Push every 4 hours PRN  mirtazapine 15 milliGRAM(s) Oral at bedtime  PARoxetine 20 milliGRAM(s) Oral daily  polyethylene glycol 3350 17 Gram(s) Oral two times a day  QUEtiapine 300 milliGRAM(s) Oral at bedtime  risperiDONE   Tablet 2 milliGRAM(s) Oral daily  senna 2 Tablet(s) Oral at bedtime  sevelamer carbonate 800 milliGRAM(s) Oral three times a day with meals  simvastatin 40 milliGRAM(s) Oral at bedtime  sodium chloride 0.9% lock flush 10 milliLiter(s) IV Push every 1 hour PRN    PE:    Vital Signs Last 24 Hrs  T(C): 36.7 (07 Dec 2022 13:37), Max: 37.5 (07 Dec 2022 01:29)  T(F): 98 (07 Dec 2022 13:37), Max: 99.5 (07 Dec 2022 01:29)  HR: 88 (07 Dec 2022 13:37) (77 - 92)  BP: 131/79 (07 Dec 2022 13:37) (129/76 - 175/94)  BP(mean): --  RR: 17 (07 Dec 2022 13:37) (17 - 18)  SpO2: 99% (07 Dec 2022 13:37) (98% - 99%)    Parameters below as of 07 Dec 2022 13:37  Patient On (Oxygen Delivery Method): nasal cannula  O2 Flow (L/min): 2    Gen: AOx3, NAD  CV: S1+S2 normal, no murmurs  Resp: Clear bilat, no resp distress  Abd: Soft, nontender, +BS  Ext: No LE edema, no wounds  : No Garcia  IV/Skin: No thrombophlebitis, R chest tunnelled HD catheter intact, Left arm wound vac in place  Neuro: no focal deficits    LABS:                          7.0    7.35  )-----------( 309      ( 07 Dec 2022 12:33 )             22.9       12-07    132<L>  |  92<L>  |  45<H>  ----------------------------<  141<H>  4.7   |  23  |  8.80<H>    Ca    9.8      07 Dec 2022 12:15            MICROBIOLOGY:  v  .Blood Blood  11-29-22   No Growth Final  --  --      .Blood Blood  11-27-22   No Growth Final  --  --      .Blood Blood-Peripheral  11-24-22   Growth in aerobic and anaerobic bottles: Staphylococcus aureus  --  Staphylococcus aureus      .Blood Blood-Peripheral  11-24-22   No Growth Final  --  --      .Surgical Swab LEFT ARM INFECTED FISTULA  11-23-22   Few Staphylococcus aureus  --  Staphylococcus aureus      .Blood Blood-Peripheral  11-23-22   Growth in aerobic and anaerobic bottles: Staphylococcus aureus  See previous culture [id number]  --    Growth in aerobic bottle: Gram Positive Cocci in Clusters  Growth in anaerobic bottle: Gram Positive Cocci in Clusters      .Blood Blood-Peripheral  11-21-22   Growth in aerobic and anaerobic bottles: Staphylococcus aureus  ***Blood Panel PCR results on this specimen are available  approximately 3 hours after the Gram stain result.***  Gram stain, PCR, and/or culture results may not always  correspond dueto difference in methodologies.  ************************************************************  This PCR assay was performed by multiplex PCR. This  Assay tests for 66 bacterial and resistance gene targets.  Please refer to the [facility] test directory  at [email or web address] for details.  --  Blood Culture PCR  Staphylococcus aureus      .Blood Blood-Peripheral  11-19-22   Growth in aerobic and anaerobic bottles: Staphylococcus aureus  See previous culture [id number]  --    Growth in aerobic bottle: Gram Positive Cocci in Clusters  Growth in anaerobic bottle: Gram Positive Cocci in Clusters      .Blood Blood-Peripheral  11-19-22   Growth in aerobic and anaerobic bottles: Staphylococcus aureus  ***Blood Panel PCR results on this specimen are available  approximately 3 hours after the Gram stain result.***  Gram stain, PCR, and/or culture results may not always  correspond dueto difference in methodologies.  ************************************************************  This PCR assay was performed by multiplex PCR. This  Assay tests for 66 bacterial and resistance gene targets.  Please refer to the [facility] test directory  at [email or web address] for details.  --  Blood Culture PCR  Staphylococcus aureus    Rapid RVP Result: NotDetec (12-06 @ 17:45)  Rapid RVP Result: NotDetec (12-04 @ 22:45)    RADIOLOGY:    < from: Xray Chest 1 View- PORTABLE-Urgent (Xray Chest 1 View- PORTABLE-Urgent .) (12.06.22 @ 17:46) >  IMPRESSION:  No focal consolidation.    < end of copied text >

## 2022-12-07 NOTE — PROVIDER CONTACT NOTE (CRITICAL VALUE NOTIFICATION) - ASSESSMENT
Patient is alert and oriented x 4. VSS.
pt VSS, on IV antibiotics
Pt on Ancef 1gm every 24 hours
Pt currently in dialysis and unable to assess at this time. Per lab, pt with critical hemoglobin of 7.0

## 2022-12-07 NOTE — PROGRESS NOTE ADULT - ASSESSMENT
42-year-old male with past medical history ESRD on HD (at home, M to F via AV graft), COPD on home oxygen 4L, bipolar disorder, schizophrenia presents with fever and chills x1 day. Patient states he had a fever of up to 104 at home and in [place] ED was Tmax 100.6    ESRD on HD  Infected access, MSSA bacteremia, Septic Pulm emboli  S/P AVG excision 11/23/22  On Cefazolin per Infectious disease specialist  s/p Permacath placement by IR  pt seen and examined on HD- tolerating HD well- high bp which is improving with hd--> uf 3kg as tolerated-- cont to monitor- currently asymptomatic  hold off avf for now after d/w vascular--> avf outpt  Outpt HD set up at [facility]  Renal diet  Daily BMP    HTN  UF on HD  overall bp better    Anemia of CKD  Epo during HD  20k tiw with hd  trend hgb    BMD of CKD  Hyperphosphatemia- phos is 5.6  renvela with meals  trend etta phos    Hyponatremia  likely volume related  UF on HD  trend na      Dr Mauricio  [phone]

## 2022-12-07 NOTE — PROVIDER CONTACT NOTE (CRITICAL VALUE NOTIFICATION) - ACTION/TREATMENT ORDERED:
VSS
Per pt's recent hemoglobin trends, no interventions needed at this time.
continue to monitor
As per provider, MD Nichols, no interventions needed at this time.

## 2022-12-07 NOTE — PROGRESS NOTE ADULT - ASSESSMENT
42-year-old male with past medical history ESRD on HD (at home, M to F via AV graft), COPD on home oxygen 4L, bipolar disorder, schizophrenia presented to [place] fever and chills x1 day. Transferred to [facility] for continued MSSA bacteremia management and further imaging, now s/p LUE AV graft excision 11/23 now s/p ALBINO without vegetation, now s/p shiley to permacath conversion.

## 2022-12-07 NOTE — PROVIDER CONTACT NOTE (CRITICAL VALUE NOTIFICATION) - TEST AND RESULT REPORTED:
Blood culture obtained yesterday, preliminary test result; growth in aerobic bottle, gram positive cocci in clusters
blood culture from 11/23 gram stain growth in aerobic and anerobic bottle, gram positive cocci in cluster
Growth in anaerobic bottle gram positive cocci in clusters
Hemoglobin 7

## 2022-12-07 NOTE — PROVIDER CONTACT NOTE (CRITICAL VALUE NOTIFICATION) - BACKGROUND
Patient with PMH: bipolar disorder, schizophrenia, COPD, HTN and ESRD. Pt is s/p LUE aneurysm resection washout of infected fistula.
admitted with fevers AVF infection
Pt s/p LUE graft excision 11/23 w/ wound vac in place.

## 2022-12-07 NOTE — PROGRESS NOTE ADULT - SUBJECTIVE AND OBJECTIVE BOX
Patient is a 42y old  Male who presents with a chief complaint of fevers and chills (07 Dec 2022 13:12)      SUBJECTIVE / OVERNIGHT EVENTS:   Fever on 12/6 with  Max: 101.9 (06 Dec 2022 16:58) and no recurrence .  HD on 12/7         ADDITIONAL REVIEW OF SYSTEMS: Negative except for above    MEDICATIONS  (STANDING):  ceFAZolin   IVPB 1000 milliGRAM(s) IV Intermittent every 24 hours  chlorhexidine 4% Liquid 1 Application(s) Topical daily  epoetin cyndie-epbx (RETACRIT) Injectable 98349 Unit(s) IV Push <User Schedule>  guaiFENesin  milliGRAM(s) Oral every 12 hours  heparin   Injectable 5000 Unit(s) SubCutaneous every 12 hours  mirtazapine 15 milliGRAM(s) Oral at bedtime  PARoxetine 20 milliGRAM(s) Oral daily  polyethylene glycol 3350 17 Gram(s) Oral two times a day  QUEtiapine 300 milliGRAM(s) Oral at bedtime  risperiDONE   Tablet 2 milliGRAM(s) Oral daily  senna 2 Tablet(s) Oral at bedtime  sevelamer carbonate 800 milliGRAM(s) Oral three times a day with meals  simvastatin 40 milliGRAM(s) Oral at bedtime    MEDICATIONS  (PRN):  benzonatate 100 milliGRAM(s) Oral every 8 hours PRN Cough  HYDROmorphone  Injectable 1 milliGRAM(s) IV Push every 4 hours PRN Severe Pain (7 - 10)  sodium chloride 0.9% lock flush 10 milliLiter(s) IV Push every 1 hour PRN Pre/post blood products, medications, blood draw, and to maintain line patency      CAPILLARY BLOOD GLUCOSE        I&O's Summary    06 Dec 2022 07:01  -  07 Dec 2022 07:00  --------------------------------------------------------  IN: 1100 mL / OUT: 90 mL / NET: 1010 mL    07 Dec 2022 07:01  -  07 Dec 2022 14:00  --------------------------------------------------------  IN: 0 mL / OUT: 3000 mL / NET: -3000 mL        PHYSICAL EXAM:  Vital Signs Last 24 Hrs  T(C): 36.7 (07 Dec 2022 13:37), Max: 38.8 (06 Dec 2022 16:58)  T(F): 98 (07 Dec 2022 13:37), Max: 101.9 (06 Dec 2022 16:58)  HR: 88 (07 Dec 2022 13:37) (77 - 92)  BP: 131/79 (07 Dec 2022 13:37) (129/76 - 175/94)  BP(mean): --  RR: 17 (07 Dec 2022 13:37) (17 - 18)  SpO2: 99% (07 Dec 2022 13:37) (98% - 99%)    Parameters below as of 07 Dec 2022 13:37  Patient On (Oxygen Delivery Method): nasal cannula  O2 Flow (L/min): 2      PHYSICAL EXAM:  CONSTITUTIONAL: NAD, obese  EYES: PERRLA; conjunctiva and sclera clear  ENMT: MMM  NECK: Supple,  RESPIRATORY: Normal respiratory effort; CTAB  CARDIOVASCULAR: RRR, no JVD, no peripheral edema, +L avf site bandaged  ABDOMEN: Nontender to palpation, normoactive BS, no guarding/rigidity  MUSCLOSKELETAL:  no clubbing/cyanosis, no joint swelling or tenderness to palpation  PSYCH: A+O x 3, affect normal  SKIN:  Rt anterior chest Tunneled catheter , Lt upper ext Wound Vacc with brownish secretion       LABS:                        7.0    7.35  )-----------( 309      ( 07 Dec 2022 12:33 )             22.9     12-07    132<L>  |  92<L>  |  45<H>  ----------------------------<  141<H>  4.7   |  23  |  8.80<H>    Ca    9.8      07 Dec 2022 12:15                  RADIOLOGY & ADDITIONAL TESTS:    Imaging Personally Reviewed:    Electrocardiogram Personally Reviewed:    COORDINATION OF CARE:  Care Discussed with Consultants/Other Providers [Y/N]:  Prior or Outpatient Records Reviewed [Y/N]:

## 2022-12-07 NOTE — PROGRESS NOTE ADULT - PROBLEM SELECTOR PLAN 3
Patient complete dialysis at home M/Tue/off Wed/Thur/ off Fri/ Sat/ Sun. AVF in place (prior hx of aneurysm per [facility] records)  - s/p permacath placement by IR 12/2  - renal attg to discuss with vascular about placing new RUE AVF as inpatient  -HD per nephro

## 2022-12-07 NOTE — PROGRESS NOTE ADULT - SUBJECTIVE AND OBJECTIVE BOX
Patient seen and examined  no complaints    aspirin (Swelling)  fish (Unknown)  penicillins (Swelling)  shellfish (Rash)    Hospital Medications:   MEDICATIONS  (STANDING):  ceFAZolin   IVPB 1000 milliGRAM(s) IV Intermittent every 24 hours  chlorhexidine 4% Liquid 1 Application(s) Topical daily  epoetin cyndie-epbx (RETACRIT) Injectable 75419 Unit(s) IV Push <User Schedule>  guaiFENesin  milliGRAM(s) Oral every 12 hours  heparin   Injectable 5000 Unit(s) SubCutaneous every 12 hours  mirtazapine 15 milliGRAM(s) Oral at bedtime  PARoxetine 20 milliGRAM(s) Oral daily  polyethylene glycol 3350 17 Gram(s) Oral two times a day  QUEtiapine 300 milliGRAM(s) Oral at bedtime  risperiDONE   Tablet 2 milliGRAM(s) Oral daily  senna 2 Tablet(s) Oral at bedtime  sevelamer carbonate 800 milliGRAM(s) Oral three times a day with meals  simvastatin 40 milliGRAM(s) Oral at bedtime        VITALS:  T(F): 97.3 (12-07-22 @ 09:16), Max: 101.9 (12-06-22 @ 16:58)  HR: 79 (12-07-22 @ 09:16)  BP: 175/94 (12-07-22 @ 09:16)  RR: 18 (12-07-22 @ 09:16)  SpO2: 98% (12-07-22 @ 09:16)  Wt(kg): --    12-06 @ 07:01  -  12-07 @ 07:00  --------------------------------------------------------  IN: 1100 mL / OUT: 90 mL / NET: 1010 mL        Physical Exam :-  Constitutional: NAD  Neck: Supple.  Respiratory: Bilateral equal breath sounds,  Cardiovascular: S1, S2 normal,  Gastrointestinal: Bowel Sounds present, soft, non tender.  Extremities: 1+ edema  Neurological: Alert and Oriented x 3, no focal deficits  Psychiatric: Normal mood, normal affect    LABS:  12-07    132<L>  |  92<L>  |  45<H>  ----------------------------<  141<H>  4.7   |  23  |  8.80<H>    Ca    9.8      07 Dec 2022 12:15      Creatinine Trend: 8.80 <--, 6.72 <--, 6.97 <--, 10.15 <--, 8.35 <--                        7.0    7.35  )-----------( 309      ( 07 Dec 2022 12:33 )             22.9     Urine Studies:      RADIOLOGY & ADDITIONAL STUDIES:

## 2022-12-07 NOTE — PROGRESS NOTE ADULT - PROBLEM SELECTOR PLAN 1
STABLE NOW ---> SPike fever on 12/6  Found to have MSSA bacteremia 2/2 possible AVF infection d/t noted purulence. Transferred to [facility] for further imaging. S/p empiric treatment with vanc (11/19) and cefepime (11/19). Continued on ancef. Leukocytosis improved. LUE negative for findings of DVT.     -blood culture - MSSA, cultures 11/24 positive,  FIRST NEGATIVE blood culture on 11/27.    AS per ID : Ancef 1GM IV q24h - for discharge dose three times weekly post HD 2GM, 2GM, 3GM through 1/8/23 for 6 weeks  - s/p R IJ shiley placed 11/21- now clotted- now s/p new shiley--> S/P Tunneled catheter by IR on 12/2  - s/p LUE graft excision on 11/23 now on wound vac -----> vasc surgery was contacted and they want pt to be dc with wound vac and will f/up with vascular surg oupt .  ID is ok with using the HD cathter for ABX .    - TTE unchanged from 2019, ALBINO no vegetations    FEVER WOrk up due to spike on 12/6 ---> ID f/p on 12/7

## 2022-12-07 NOTE — PROVIDER CONTACT NOTE (CRITICAL VALUE NOTIFICATION) - RECOMMENDATIONS
continue to monitor
Make provider aware of pt's hemoglobin and inability to assess at this time.
Notify provider, labs? repeat BC?

## 2022-12-08 ENCOUNTER — TRANSCRIPTION ENCOUNTER (OUTPATIENT)
Age: 42
End: 2022-12-08

## 2022-12-08 LAB
ANION GAP SERPL CALC-SCNC: 15 MMOL/L — SIGNIFICANT CHANGE UP (ref 5–17)
BUN SERPL-MCNC: 35 MG/DL — HIGH (ref 7–23)
CALCIUM SERPL-MCNC: 9.9 MG/DL — SIGNIFICANT CHANGE UP (ref 8.4–10.5)
CHLORIDE SERPL-SCNC: 91 MMOL/L — LOW (ref 96–108)
CO2 SERPL-SCNC: 24 MMOL/L — SIGNIFICANT CHANGE UP (ref 22–31)
CREAT SERPL-MCNC: 6.69 MG/DL — HIGH (ref 0.5–1.3)
EGFR: 10 ML/MIN/1.73M2 — LOW
FERRITIN SERPL-MCNC: 1762 NG/ML — HIGH (ref 30–400)
GLUCOSE SERPL-MCNC: 142 MG/DL — HIGH (ref 70–99)
HCT VFR BLD CALC: 23.4 % — LOW (ref 39–50)
HGB BLD-MCNC: 7.3 G/DL — LOW (ref 13–17)
IRON SATN MFR SERPL: 19 % — SIGNIFICANT CHANGE UP (ref 16–55)
IRON SATN MFR SERPL: 39 UG/DL — LOW (ref 45–165)
MAGNESIUM SERPL-MCNC: 2.2 MG/DL — SIGNIFICANT CHANGE UP (ref 1.6–2.6)
MCHC RBC-ENTMCNC: 30.8 PG — SIGNIFICANT CHANGE UP (ref 27–34)
MCHC RBC-ENTMCNC: 31.2 GM/DL — LOW (ref 32–36)
MCV RBC AUTO: 98.7 FL — SIGNIFICANT CHANGE UP (ref 80–100)
NRBC # BLD: 0 /100 WBCS — SIGNIFICANT CHANGE UP (ref 0–0)
PLATELET # BLD AUTO: 306 K/UL — SIGNIFICANT CHANGE UP (ref 150–400)
POTASSIUM SERPL-MCNC: 4.8 MMOL/L — SIGNIFICANT CHANGE UP (ref 3.5–5.3)
POTASSIUM SERPL-SCNC: 4.8 MMOL/L — SIGNIFICANT CHANGE UP (ref 3.5–5.3)
RBC # BLD: 2.37 M/UL — LOW (ref 4.2–5.8)
RBC # FLD: 15.3 % — HIGH (ref 10.3–14.5)
SODIUM SERPL-SCNC: 130 MMOL/L — LOW (ref 135–145)
TIBC SERPL-MCNC: 202 UG/DL — LOW (ref 220–430)
UIBC SERPL-MCNC: 163 UG/DL — SIGNIFICANT CHANGE UP (ref 110–370)
WBC # BLD: 7.02 K/UL — SIGNIFICANT CHANGE UP (ref 3.8–10.5)
WBC # FLD AUTO: 7.02 K/UL — SIGNIFICANT CHANGE UP (ref 3.8–10.5)

## 2022-12-08 PROCEDURE — 99232 SBSQ HOSP IP/OBS MODERATE 35: CPT

## 2022-12-08 RX ORDER — HYDROMORPHONE HYDROCHLORIDE 2 MG/ML
0.5 INJECTION INTRAMUSCULAR; INTRAVENOUS; SUBCUTANEOUS ONCE
Refills: 0 | Status: DISCONTINUED | OUTPATIENT
Start: 2022-12-08 | End: 2022-12-09

## 2022-12-08 RX ORDER — HYDROMORPHONE HYDROCHLORIDE 2 MG/ML
0.5 INJECTION INTRAMUSCULAR; INTRAVENOUS; SUBCUTANEOUS ONCE
Refills: 0 | Status: DISCONTINUED | OUTPATIENT
Start: 2022-12-08 | End: 2022-12-08

## 2022-12-08 RX ORDER — CEFAZOLIN SODIUM 1 G
3 VIAL (EA) INJECTION
Qty: 15 | Refills: 0
Start: 2022-12-08 | End: 2022-12-12

## 2022-12-08 RX ORDER — LACTULOSE 10 G/15ML
10 SOLUTION ORAL ONCE
Refills: 0 | Status: COMPLETED | OUTPATIENT
Start: 2022-12-08 | End: 2022-12-08

## 2022-12-08 RX ORDER — CEFAZOLIN SODIUM 1 G
2 VIAL (EA) INJECTION
Qty: 14 | Refills: 0
Start: 2022-12-08 | End: 2022-12-14

## 2022-12-08 RX ADMIN — HEPARIN SODIUM 5000 UNIT(S): 5000 INJECTION INTRAVENOUS; SUBCUTANEOUS at 17:10

## 2022-12-08 RX ADMIN — HYDROMORPHONE HYDROCHLORIDE 0.5 MILLIGRAM(S): 2 INJECTION INTRAMUSCULAR; INTRAVENOUS; SUBCUTANEOUS at 17:30

## 2022-12-08 RX ADMIN — Medication 600 MILLIGRAM(S): at 08:35

## 2022-12-08 RX ADMIN — SIMVASTATIN 40 MILLIGRAM(S): 20 TABLET, FILM COATED ORAL at 21:15

## 2022-12-08 RX ADMIN — POLYETHYLENE GLYCOL 3350 17 GRAM(S): 17 POWDER, FOR SOLUTION ORAL at 17:14

## 2022-12-08 RX ADMIN — HYDROMORPHONE HYDROCHLORIDE 2 MILLIGRAM(S): 2 INJECTION INTRAMUSCULAR; INTRAVENOUS; SUBCUTANEOUS at 21:18

## 2022-12-08 RX ADMIN — HYDROMORPHONE HYDROCHLORIDE 2 MILLIGRAM(S): 2 INJECTION INTRAMUSCULAR; INTRAVENOUS; SUBCUTANEOUS at 21:48

## 2022-12-08 RX ADMIN — SEVELAMER CARBONATE 800 MILLIGRAM(S): 2400 POWDER, FOR SUSPENSION ORAL at 17:11

## 2022-12-08 RX ADMIN — NALOXEGOL OXALATE 12.5 MILLIGRAM(S): 12.5 TABLET, FILM COATED ORAL at 12:08

## 2022-12-08 RX ADMIN — HYDROMORPHONE HYDROCHLORIDE 0.5 MILLIGRAM(S): 2 INJECTION INTRAMUSCULAR; INTRAVENOUS; SUBCUTANEOUS at 10:55

## 2022-12-08 RX ADMIN — HYDROMORPHONE HYDROCHLORIDE 2 MILLIGRAM(S): 2 INJECTION INTRAMUSCULAR; INTRAVENOUS; SUBCUTANEOUS at 15:30

## 2022-12-08 RX ADMIN — MIRTAZAPINE 15 MILLIGRAM(S): 45 TABLET, ORALLY DISINTEGRATING ORAL at 21:14

## 2022-12-08 RX ADMIN — LACTULOSE 10 GRAM(S): 10 SOLUTION ORAL at 12:09

## 2022-12-08 RX ADMIN — SEVELAMER CARBONATE 800 MILLIGRAM(S): 2400 POWDER, FOR SUSPENSION ORAL at 08:34

## 2022-12-08 RX ADMIN — Medication 100 MILLIGRAM(S): at 23:21

## 2022-12-08 RX ADMIN — Medication 600 MILLIGRAM(S): at 20:10

## 2022-12-08 RX ADMIN — HYDROMORPHONE HYDROCHLORIDE 0.5 MILLIGRAM(S): 2 INJECTION INTRAMUSCULAR; INTRAVENOUS; SUBCUTANEOUS at 17:09

## 2022-12-08 RX ADMIN — HYDROMORPHONE HYDROCHLORIDE 2 MILLIGRAM(S): 2 INJECTION INTRAMUSCULAR; INTRAVENOUS; SUBCUTANEOUS at 16:20

## 2022-12-08 RX ADMIN — Medication 20 MILLIGRAM(S): at 12:08

## 2022-12-08 RX ADMIN — HYDROMORPHONE HYDROCHLORIDE 2 MILLIGRAM(S): 2 INJECTION INTRAMUSCULAR; INTRAVENOUS; SUBCUTANEOUS at 06:51

## 2022-12-08 RX ADMIN — QUETIAPINE FUMARATE 300 MILLIGRAM(S): 200 TABLET, FILM COATED ORAL at 21:14

## 2022-12-08 RX ADMIN — HEPARIN SODIUM 5000 UNIT(S): 5000 INJECTION INTRAVENOUS; SUBCUTANEOUS at 06:40

## 2022-12-08 RX ADMIN — RISPERIDONE 2 MILLIGRAM(S): 4 TABLET ORAL at 12:09

## 2022-12-08 RX ADMIN — SEVELAMER CARBONATE 800 MILLIGRAM(S): 2400 POWDER, FOR SUSPENSION ORAL at 12:09

## 2022-12-08 RX ADMIN — HYDROMORPHONE HYDROCHLORIDE 0.5 MILLIGRAM(S): 2 INJECTION INTRAMUSCULAR; INTRAVENOUS; SUBCUTANEOUS at 00:06

## 2022-12-08 RX ADMIN — HYDROMORPHONE HYDROCHLORIDE 2 MILLIGRAM(S): 2 INJECTION INTRAMUSCULAR; INTRAVENOUS; SUBCUTANEOUS at 07:21

## 2022-12-08 RX ADMIN — HYDROMORPHONE HYDROCHLORIDE 0.5 MILLIGRAM(S): 2 INJECTION INTRAMUSCULAR; INTRAVENOUS; SUBCUTANEOUS at 09:40

## 2022-12-08 NOTE — PROGRESS NOTE ADULT - ASSESSMENT
42-year-old male with past medical history ESRD on HD (at home, M to F via AV graft), COPD on home oxygen 4L, bipolar disorder, schizophrenia presents with fever and chills x1 day. Patient states he had a fever of up to 104 at home and in [place] ED was Tmax 100.6    ESRD on HD  Infected access, MSSA bacteremia, Septic Pulm emboli  S/P AVG excision 11/23/22  On Cefazolin per Infectious disease specialist  s/p Permacath placement by IR  s/p HD yesterday- tolerated HD well- high bp which is improving with hd--> uf 3kg   Plan for next HD tomm  hold off avf for now after d/w vascular--> avf outpt  Outpt HD set up at [facility]  Renal diet  Daily BMP    HTN  UF on HD  overall bp better    Anemia of CKD  Epo during HD  20k tiw with hd  trend hgb    BMD of CKD  Hyperphosphatemia- phos is 5.6  renvela with meals  trend etta phos    Hyponatremia  likely volume related  UF on HD  trend na      Dr Mauricio  [phone]

## 2022-12-08 NOTE — PROGRESS NOTE ADULT - SUBJECTIVE AND OBJECTIVE BOX
CC: Patient is a 42y old  Male who presents with a chief complaint of fevers and chills (08 Dec 2022 07:43)    ID following for fever    Interval History/ROS: Patient remains on 2L NC, states on supplemental O2 at home when he lies down. No longer with fevers. No complaints. Otherwise feels well.    Rest of ROS negative.    Allergies  aspirin (Swelling)  fish (Unknown)  penicillins (Swelling)  shellfish (Rash)    ANTIMICROBIALS:  ceFAZolin   IVPB 1000 every 24 hours    OTHER MEDS:  benzonatate 100 milliGRAM(s) Oral every 8 hours PRN  chlorhexidine 4% Liquid 1 Application(s) Topical daily  epoetin cyndie-epbx (RETACRIT) Injectable 61146 Unit(s) IV Push <User Schedule>  guaiFENesin  milliGRAM(s) Oral every 12 hours  heparin   Injectable 5000 Unit(s) SubCutaneous every 12 hours  HYDROmorphone   Tablet 2 milliGRAM(s) Oral every 4 hours PRN  mirtazapine 15 milliGRAM(s) Oral at bedtime  naloxegol 12.5 milliGRAM(s) Oral daily  PARoxetine 20 milliGRAM(s) Oral daily  polyethylene glycol 3350 17 Gram(s) Oral two times a day  QUEtiapine 300 milliGRAM(s) Oral at bedtime  risperiDONE   Tablet 2 milliGRAM(s) Oral daily  senna 2 Tablet(s) Oral at bedtime  sevelamer carbonate 800 milliGRAM(s) Oral three times a day with meals  simvastatin 40 milliGRAM(s) Oral at bedtime  sodium chloride 0.9% lock flush 10 milliLiter(s) IV Push every 1 hour PRN    PE:    Vital Signs Last 24 Hrs  T(C): 36.6 (08 Dec 2022 09:04), Max: 37.6 (07 Dec 2022 21:23)  T(F): 97.9 (08 Dec 2022 09:04), Max: 99.7 (07 Dec 2022 21:23)  HR: 81 (08 Dec 2022 09:04) (81 - 94)  BP: 160/89 (08 Dec 2022 09:04) (130/79 - 160/89)  BP(mean): --  RR: 18 (08 Dec 2022 09:04) (17 - 18)  SpO2: 97% (08 Dec 2022 09:04) (97% - 100%)    Parameters below as of 08 Dec 2022 09:04  Patient On (Oxygen Delivery Method): nasal cannula  O2 Flow (L/min): 2    Gen: AOx3, NAD  CV: S1+S2 normal, no murmurs  Resp: Clear bilat, no resp distress  Abd: Soft, nontender, +BS  Ext: No LE edema, no wounds  : No Garcia  IV/Skin: No thrombophlebitis, R chest tunnelled HD catheter intact, Left arm wound vac in place  Neuro: no focal deficits    LABS:                          7.3    7.02  )-----------( 306      ( 08 Dec 2022 08:24 )             23.4       12-08    130<L>  |  91<L>  |  35<H>  ----------------------------<  142<H>  4.8   |  24  |  6.69<H>    Ca    9.9      08 Dec 2022 08:23  Mg     2.2     12-08            MICROBIOLOGY:  v  .Blood Blood-Peripheral  12-06-22   No growth to date.  --  --      .Blood Blood  11-29-22   No Growth Final  --  --      .Blood Blood  11-27-22   No Growth Final  --  --      .Blood Blood-Peripheral  11-24-22   Growth in aerobic and anaerobic bottles: Staphylococcus aureus  --  Staphylococcus aureus      .Blood Blood-Peripheral  11-24-22   No Growth Final  --  --      .Surgical Swab LEFT ARM INFECTED FISTULA  11-23-22   Few Staphylococcus aureus  --  Staphylococcus aureus      .Blood Blood-Peripheral  11-23-22   Growth in aerobic and anaerobic bottles: Staphylococcus aureus  See previous culture [id number]  --    Growth in aerobic bottle: Gram Positive Cocci in Clusters  Growth in anaerobic bottle: Gram Positive Cocci in Clusters      .Blood Blood-Peripheral  11-21-22   Growth in aerobic and anaerobic bottles: Staphylococcus aureus  ***Blood Panel PCR results on this specimen are available  approximately 3 hours after the Gram stain result.***  Gram stain, PCR, and/or culture results may not always  correspond dueto difference in methodologies.  ************************************************************  This PCR assay was performed by multiplex PCR. This  Assay tests for 66 bacterial and resistance gene targets.  Please refer to the [facility] test directory  at [email or web address] for details.  --  Blood Culture PCR  Staphylococcus aureus      .Blood Blood-Peripheral  11-19-22   Growth in aerobic and anaerobic bottles: Staphylococcus aureus  See previous culture [id number]  --    Growth in aerobic bottle: Gram Positive Cocci in Clusters  Growth in anaerobic bottle: Gram Positive Cocci in Clusters      .Blood Blood-Peripheral  11-19-22   Growth in aerobic and anaerobic bottles: Staphylococcus aureus  ***Blood Panel PCR results on this specimen are available  approximately 3 hours after the Gram stain result.***  Gram stain, PCR, and/or culture results may not always  correspond dueto difference in methodologies.  ************************************************************  This PCR assay was performed by multiplex PCR. This  Assay tests for 66 bacterial and resistance gene targets.  Please refer to the [facility] test directory  at [email or web address] for details.  --  Blood Culture PCR  Staphylococcus aureus    Rapid RVP Result: NotDetec (12-06 @ 17:45)  Rapid RVP Result: NotDetec (12-04 @ 22:45)    RADIOLOGY:  < from: Xray Chest 1 View- PORTABLE-Urgent (Xray Chest 1 View- PORTABLE-Urgent .) (12.06.22 @ 17:46) >  IMPRESSION:  No focal consolidation.      < end of copied text >
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
## 2022-12-08 NOTE — DISCHARGE NOTE PROVIDER - PROVIDER TOKENS
PROVIDER:[TOKEN:[[id number]]],PROVIDER:[TOKEN:[[id number]]],PROVIDER:[TOKEN:[[id number]]]

## 2022-12-08 NOTE — PROGRESS NOTE ADULT - ASSESSMENT
42-year-old male with past medical history ESRD on HD (at home, M to F via AV graft), COPD on home oxygen 4L, bipolar disorder, schizophrenia presented to [place] fever and chills x1 day. Transferred to [facility] for continued MSSA bacteremia management and further imaging, now s/p LUE AV graft excision 11/23 now s/p ALBINO without vegetation, now s/p shiley to permacath conversion.

## 2022-12-08 NOTE — PROGRESS NOTE ADULT - PROBLEM SELECTOR PROBLEM 7
A (Catheter 6fr 145d Pgtl Crv 110cm 2 Wire Braid Stiff Prox) catheter was inserted.   COPD (chronic obstructive pulmonary disease) Schizophrenia

## 2022-12-08 NOTE — PROGRESS NOTE ADULT - PROBLEM SELECTOR PLAN 3
Patient complete dialysis at home M/Tue/off Wed/Thur/ off Fri/ Sat/ Sun. AVF in place (prior hx of aneurysm per [facility] records)  - s/p permacath placement by IR 12/2  - renal attg to discuss with vascular about placing new RUE AVF as inpatient  -HD per nephro

## 2022-12-08 NOTE — DISCHARGE NOTE PROVIDER - NSDCCPCAREPLAN_GEN_ALL_CORE_FT
PRINCIPAL DISCHARGE DIAGNOSIS  Diagnosis: MSSA bacteremia  Assessment and Plan of Treatment: MSSA, cultures 11/24 positive,  FIRST NEGATIVE blood culture on 11/27.    AS per ID : Ancef 1GM IV q24h - for discharge dose three times weekly post HD 2GM, 2GM, 3GM through 1/8/23 for 6 weeks        SECONDARY DISCHARGE DIAGNOSES  Diagnosis: Infection of AV graft for dialysis  Assessment and Plan of Treatment: You underwent excision of LUE graft 11/23/22> follow up with vascular surgery    Diagnosis: Hypertension  Assessment and Plan of Treatment: Follow up with your medical doctor to establish long term blood pressure treatment goals.      Diagnosis: ESRD on dialysis  Assessment and Plan of Treatment: Tunneled catheter by IR on 12/2  Continue HD as scheduled    Diagnosis: Cirrhosis  Assessment and Plan of Treatment: Hepatomegaly on CT scan likely seconday to Non Alcoholic cirrhosis.   Follow up with GI/Hepatology as outpatient       Diagnosis: Constipation  Assessment and Plan of Treatment: continue bowel regimen ,Follow up with GI    Diagnosis: Bipolar disorder  Assessment and Plan of Treatment: Continue current medications. follow up with PMD and psyc for managment

## 2022-12-08 NOTE — PROGRESS NOTE ADULT - ASSESSMENT
42M BMI 48, COPD on home oxygen, hypertensive ESRD, bipolar, schizophrenia.   Here 11/19 with sepsis, high grade MSSA bacteremia.   From left arm AV graft, excised 11/23.   Clearing 11/27 and ALBINO without vegetations but CT suggested pulmonary septic emboli.   Stable., afebrile, leukocytosis resolved.   s/p tunnelled HD catheter placement 12/2  Febrile 12/6, RVP negative, CXR negative    Suggest  -F/U repeat blood cultures so far no growth  -Ancef 1GM IV q24h - for discharge dose three times weekly post HD 2GM, 2GM, 3GM through 1/8/23 for 6 weeks   -Can followup outpatient with Dr. Eduardo- for appointments can call [phone].    Osmany Bautista MD  Available through MS Teams  If no response, or after 5pm/weekends, call [phone]

## 2022-12-08 NOTE — DISCHARGE NOTE PROVIDER - CARE PROVIDER_API CALL
Osmany Bautista)  Internal Medicine  [address] [place] [address]  Phone: [phone]  Fax: [phone]  Follow Up Time:     Darryl Prince)  Vascular Surgery  [address] [place] [address], [address]  [place] [address]  Phone: [phone]  Fax: [phone]  Follow Up Time:     Cristiana Mauricio)  Internal Medicine  [address] [place] [address]  Phone: [phone]  Fax: [phone]  Follow Up Time:

## 2022-12-08 NOTE — DISCHARGE NOTE PROVIDER - NSDCMRMEDTOKEN_GEN_ALL_CORE_FT
ammonium lactate 12% topical lotion: Apply topically to affected area 2 times a day  ascorbic acid 500 mg oral capsule: 1 cap(s) orally once a day  budesonide/glycopyrrolate/formoterol 160 mcg-9 mcg-4.8 mcg/inh inhalation aerosol: 2 puff(s) inhaled 2 times a day  ceFAZolin 2 g injection: 2 gram(s) intravenously once a day on Wednesdays post dialysis until 1/8/23  clopidogrel 75 mg oral tablet: 1 tab(s) orally once a day  diclofenac 1% topical gel: Apply topically to affected area once a day (at bedtime)  DuoNeb 0.5 mg-2.5 mg/3 mL inhalation solution: 3 milliliter(s) inhaled 4 times a day, As Needed  ferrous sulfate 325 mg (65 mg elemental iron) oral tablet: 1 tab(s) orally once a day  HYDROmorphone 2 mg oral tablet: 1 tab(s) orally every 6 hours, As needed, Severe Pain (7 - 10)  hydrOXYzine hydrochloride 25 mg oral tablet: 2 tab(s) orally 2 times a day, As Needed  PARoxetine 20 mg oral tablet: 1 tab(s) orally once a day  Remeron 15 mg oral tablet: 1 tab(s) orally once a day (at bedtime)  risperiDONE 2 mg oral tablet: 1 tab(s) orally once a day  Senna 8.6 mg oral tablet: 2 tab(s) orally once a day (at bedtime)  SEROquel 300 mg oral tablet: 1 tab(s) orally once a day (at bedtime)  sevelamer hydrochloride 800 mg oral tablet: 3 tab(s) orally 3 times a day  simvastatin 40 mg oral tablet: 1 tab(s) orally once a day (at bedtime)  Ventolin 2 mg oral tablet: 1 tab(s) orally 4 times a day, As Needed   ammonium lactate 12% topical lotion: Apply topically to affected area 2 times a day  ascorbic acid 500 mg oral capsule: 1 cap(s) orally once a day  budesonide/glycopyrrolate/formoterol 160 mcg-9 mcg-4.8 mcg/inh inhalation aerosol: 2 puff(s) inhaled 2 times a day  ceFAZolin 1 g injection: 3 gram(s) intravenously once a day on Fridays post dialysis until 1/8/23  ceFAZolin 2 g injection: 2 gram(s) intravenously once a day on Mondays and Wednesdays post dialysis until 1/8/23  ceFAZolin 2 g injection: 2 gram(s) intravenously once a day on Wednesdays post dialysis until 1/8/23  clopidogrel 75 mg oral tablet: 1 tab(s) orally once a day  diclofenac 1% topical gel: Apply topically to affected area once a day (at bedtime)  DuoNeb 0.5 mg-2.5 mg/3 mL inhalation solution: 3 milliliter(s) inhaled 4 times a day, As Needed  ferrous sulfate 325 mg (65 mg elemental iron) oral tablet: 1 tab(s) orally once a day  HYDROmorphone 2 mg oral tablet: 1 tab(s) orally every 6 hours, As needed, Severe Pain (7 - 10)  hydrOXYzine hydrochloride 25 mg oral tablet: 2 tab(s) orally 2 times a day, As Needed  PARoxetine 20 mg oral tablet: 1 tab(s) orally once a day  Remeron 15 mg oral tablet: 1 tab(s) orally once a day (at bedtime)  risperiDONE 2 mg oral tablet: 1 tab(s) orally once a day  Senna 8.6 mg oral tablet: 2 tab(s) orally once a day (at bedtime)  SEROquel 300 mg oral tablet: 1 tab(s) orally once a day (at bedtime)  sevelamer hydrochloride 800 mg oral tablet: 3 tab(s) orally 3 times a day  simvastatin 40 mg oral tablet: 1 tab(s) orally once a day (at bedtime)  Ventolin 2 mg oral tablet: 1 tab(s) orally 4 times a day, As Needed   ascorbic acid 500 mg oral capsule: 1 cap(s) orally once a day  budesonide/glycopyrrolate/formoterol 160 mcg-9 mcg-4.8 mcg/inh inhalation aerosol: 2 puff(s) inhaled 2 times a day  ceFAZolin 1 g injection: 3 gram(s) intravenously once a day on Fridays post dialysis until 1/8/23  ceFAZolin 2 g injection: 2 gram(s) intravenously once a day on Mondays and Wednesdays post dialysis until 1/8/23  clopidogrel 75 mg oral tablet: 1 tab(s) orally once a day  DuoNeb 0.5 mg-2.5 mg/3 mL inhalation solution: 3 milliliter(s) inhaled 4 times a day, As Needed  ferrous sulfate 325 mg (65 mg elemental iron) oral tablet: 1 tab(s) orally once a day  PARoxetine 20 mg oral tablet: 1 tab(s) orally once a day  polyethylene glycol 3350 oral powder for reconstitution: 17 gram(s) orally 2 times a day  Remeron 15 mg oral tablet: 1 tab(s) orally once a day (at bedtime)  risperiDONE 2 mg oral tablet: 1 tab(s) orally once a day  Senna 8.6 mg oral tablet: 2 tab(s) orally once a day (at bedtime)  SEROquel 300 mg oral tablet: 1 tab(s) orally once a day (at bedtime)  sevelamer hydrochloride 800 mg oral tablet: 3 tab(s) orally 3 times a day  simvastatin 40 mg oral tablet: 1 tab(s) orally once a day (at bedtime)  Ventolin 2 mg oral tablet: 1 tab(s) orally 4 times a day, As Needed   ascorbic acid 500 mg oral capsule: 1 cap(s) orally once a day  budesonide/glycopyrrolate/formoterol 160 mcg-9 mcg-4.8 mcg/inh inhalation aerosol: 2 puff(s) inhaled 2 times a day  ceFAZolin 1 g injection: 3 gram(s) intravenously once a day on Fridays post dialysis until 1/8/23  ceFAZolin 2 g injection: 2 gram(s) intravenously once a day on Mondays and Wednesdays post dialysis until 1/8/23  clopidogrel 75 mg oral tablet: 1 tab(s) orally once a day  Dilaudid 2 mg oral tablet: 1 tab(s) orally every 8 hours MDD:MDD 3 tabs per day  DuoNeb 0.5 mg-2.5 mg/3 mL inhalation solution: 3 milliliter(s) inhaled 4 times a day, As Needed  ferrous sulfate 325 mg (65 mg elemental iron) oral tablet: 1 tab(s) orally once a day  PARoxetine 20 mg oral tablet: 1 tab(s) orally once a day  polyethylene glycol 3350 oral powder for reconstitution: 17 gram(s) orally 2 times a day  Remeron 15 mg oral tablet: 1 tab(s) orally once a day (at bedtime)  risperiDONE 2 mg oral tablet: 1 tab(s) orally once a day  Senna 8.6 mg oral tablet: 2 tab(s) orally once a day (at bedtime)  SEROquel 300 mg oral tablet: 1 tab(s) orally once a day (at bedtime)  sevelamer hydrochloride 800 mg oral tablet: 3 tab(s) orally 3 times a day  simvastatin 40 mg oral tablet: 1 tab(s) orally once a day (at bedtime)  Ventolin 2 mg oral tablet: 1 tab(s) orally 4 times a day, As Needed

## 2022-12-08 NOTE — PROGRESS NOTE ADULT - SUBJECTIVE AND OBJECTIVE BOX
patient seen and examined  no complaints      aspirin (Swelling)  fish (Unknown)  penicillins (Swelling)  shellfish (Rash)    Hospital Medications:   MEDICATIONS  (STANDING):  ceFAZolin   IVPB 1000 milliGRAM(s) IV Intermittent every 24 hours  chlorhexidine 4% Liquid 1 Application(s) Topical daily  epoetin cyndie-epbx (RETACRIT) Injectable 85435 Unit(s) IV Push <User Schedule>  guaiFENesin  milliGRAM(s) Oral every 12 hours  heparin   Injectable 5000 Unit(s) SubCutaneous every 12 hours  mirtazapine 15 milliGRAM(s) Oral at bedtime  naloxegol 12.5 milliGRAM(s) Oral daily  PARoxetine 20 milliGRAM(s) Oral daily  polyethylene glycol 3350 17 Gram(s) Oral two times a day  QUEtiapine 300 milliGRAM(s) Oral at bedtime  risperiDONE   Tablet 2 milliGRAM(s) Oral daily  senna 2 Tablet(s) Oral at bedtime  sevelamer carbonate 800 milliGRAM(s) Oral three times a day with meals  simvastatin 40 milliGRAM(s) Oral at bedtime        VITALS:  T(F): 97.9 (12-08-22 @ 13:06), Max: 99.7 (12-07-22 @ 21:23)  HR: 84 (12-08-22 @ 13:06)  BP: 147/97 (12-08-22 @ 13:06)  RR: 18 (12-08-22 @ 13:06)  SpO2: 97% (12-08-22 @ 13:06)  Wt(kg): --    12-07 @ 07:01  -  12-08 @ 07:00  --------------------------------------------------------  IN: 460 mL / OUT: 3000 mL / NET: -2540 mL    Physical Exam :-  Constitutional: NAD  Neck: Supple.  Respiratory: Bilateral equal breath sounds,  Cardiovascular: S1, S2 normal,  Gastrointestinal: Bowel Sounds present, soft, non tender.  Extremities: 1+ edema  Neurological: Alert and Oriented x 3, no focal deficits  Psychiatric: Normal mood, normal affect    LABS:  12-08    130<L>  |  91<L>  |  35<H>  ----------------------------<  142<H>  4.8   |  24  |  6.69<H>    Ca    9.9      08 Dec 2022 08:23  Mg     2.2     12-08      Creatinine Trend: 6.69 <--, 8.80 <--, 6.72 <--, 6.97 <--, 10.15 <--                        7.3    7.02  )-----------( 306      ( 08 Dec 2022 08:24 )             23.4     Urine Studies:      RADIOLOGY & ADDITIONAL STUDIES:

## 2022-12-08 NOTE — PROGRESS NOTE ADULT - SUBJECTIVE AND OBJECTIVE BOX
Patient is a 42y old  Male who presents with a chief complaint of fevers and chills (08 Dec 2022 14:23)      SUBJECTIVE / OVERNIGHT EVENTS:    Tele reviewed:       ADDITIONAL REVIEW OF SYSTEMS: Negative except for above    MEDICATIONS  (STANDING):  ceFAZolin   IVPB 1000 milliGRAM(s) IV Intermittent every 24 hours  chlorhexidine 4% Liquid 1 Application(s) Topical daily  epoetin cyndie-epbx (RETACRIT) Injectable 90144 Unit(s) IV Push <User Schedule>  guaiFENesin  milliGRAM(s) Oral every 12 hours  heparin   Injectable 5000 Unit(s) SubCutaneous every 12 hours  mirtazapine 15 milliGRAM(s) Oral at bedtime  naloxegol 12.5 milliGRAM(s) Oral daily  PARoxetine 20 milliGRAM(s) Oral daily  polyethylene glycol 3350 17 Gram(s) Oral two times a day  QUEtiapine 300 milliGRAM(s) Oral at bedtime  risperiDONE   Tablet 2 milliGRAM(s) Oral daily  senna 2 Tablet(s) Oral at bedtime  sevelamer carbonate 800 milliGRAM(s) Oral three times a day with meals  simvastatin 40 milliGRAM(s) Oral at bedtime    MEDICATIONS  (PRN):  benzonatate 100 milliGRAM(s) Oral every 8 hours PRN Cough  HYDROmorphone   Tablet 2 milliGRAM(s) Oral every 4 hours PRN Moderate Pain (4 - 6)  sodium chloride 0.9% lock flush 10 milliLiter(s) IV Push every 1 hour PRN Pre/post blood products, medications, blood draw, and to maintain line patency      CAPILLARY BLOOD GLUCOSE        I&O's Summary    07 Dec 2022 07:01  -  08 Dec 2022 07:00  --------------------------------------------------------  IN: 460 mL / OUT: 3000 mL / NET: -2540 mL        PHYSICAL EXAM:  Vital Signs Last 24 Hrs  T(C): 36.6 (08 Dec 2022 13:06), Max: 37.6 (07 Dec 2022 21:23)  T(F): 97.9 (08 Dec 2022 13:06), Max: 99.7 (07 Dec 2022 21:23)  HR: 84 (08 Dec 2022 13:06) (81 - 94)  BP: 147/97 (08 Dec 2022 13:06) (130/79 - 160/89)  BP(mean): --  RR: 18 (08 Dec 2022 13:06) (17 - 18)  SpO2: 97% (08 Dec 2022 13:06) (97% - 100%)    Parameters below as of 08 Dec 2022 13:06  Patient On (Oxygen Delivery Method): nasal cannula  O2 Flow (L/min): 2      PHYSICAL EXAM:  GENERAL: NAD, well-developed  HEAD:  Atraumatic, Normocephalic  EYES:  conjunctiva and sclera clear  NECK: Supple, No JVD  CHEST/LUNG: Clear to auscultation bilaterally; No wheeze  HEART: Regular rate and rhythm; No murmurs, rubs, or gallops  ABDOMEN: Soft, Nontender, Nondistended; Bowel sounds present  EXTREMITIES:  2+ Peripheral Pulses, No clubbing, cyanosis, or edema  PSYCH: AAOx3  NEUROLOGY: non-focal  SKIN: No rashes or lesions      LABS:                        7.3    7.02  )-----------( 306      ( 08 Dec 2022 08:24 )             23.4     12-08    130<L>  |  91<L>  |  35<H>  ----------------------------<  142<H>  4.8   |  24  |  6.69<H>    Ca    9.9      08 Dec 2022 08:23  Mg     2.2     12-08                Culture - Blood (collected 06 Dec 2022 18:33)  Source: .Blood Blood-Catheter  Preliminary Report (07 Dec 2022 23:02):    No growth to date.    Culture - Blood (collected 06 Dec 2022 18:33)  Source: .Blood Blood-Peripheral  Preliminary Report (07 Dec 2022 23:02):    No growth to date.        RADIOLOGY & ADDITIONAL TESTS:    Imaging Personally Reviewed:    Electrocardiogram Personally Reviewed:    COORDINATION OF CARE:  Care Discussed with Consultants/Other Providers [Y/N]:  Prior or Outpatient Records Reviewed [Y/N]:     Patient is a 42y old  Male who presents with a chief complaint of fevers and chills (08 Dec 2022 14:23)      SUBJECTIVE / OVERNIGHT EVENTS:  no complaints today , feels better and wants to go home       ADDITIONAL REVIEW OF SYSTEMS: Negative except for above    MEDICATIONS  (STANDING):  ceFAZolin   IVPB 1000 milliGRAM(s) IV Intermittent every 24 hours  chlorhexidine 4% Liquid 1 Application(s) Topical daily  epoetin cyndie-epbx (RETACRIT) Injectable 52525 Unit(s) IV Push <User Schedule>  guaiFENesin  milliGRAM(s) Oral every 12 hours  heparin   Injectable 5000 Unit(s) SubCutaneous every 12 hours  mirtazapine 15 milliGRAM(s) Oral at bedtime  naloxegol 12.5 milliGRAM(s) Oral daily  PARoxetine 20 milliGRAM(s) Oral daily  polyethylene glycol 3350 17 Gram(s) Oral two times a day  QUEtiapine 300 milliGRAM(s) Oral at bedtime  risperiDONE   Tablet 2 milliGRAM(s) Oral daily  senna 2 Tablet(s) Oral at bedtime  sevelamer carbonate 800 milliGRAM(s) Oral three times a day with meals  simvastatin 40 milliGRAM(s) Oral at bedtime    MEDICATIONS  (PRN):  benzonatate 100 milliGRAM(s) Oral every 8 hours PRN Cough  HYDROmorphone   Tablet 2 milliGRAM(s) Oral every 4 hours PRN Moderate Pain (4 - 6)  sodium chloride 0.9% lock flush 10 milliLiter(s) IV Push every 1 hour PRN Pre/post blood products, medications, blood draw, and to maintain line patency      CAPILLARY BLOOD GLUCOSE        I&O's Summary    07 Dec 2022 07:01  -  08 Dec 2022 07:00  --------------------------------------------------------  IN: 460 mL / OUT: 3000 mL / NET: -2540 mL        PHYSICAL EXAM:  Vital Signs Last 24 Hrs  T(C): 36.6 (08 Dec 2022 13:06), Max: 37.6 (07 Dec 2022 21:23)  T(F): 97.9 (08 Dec 2022 13:06), Max: 99.7 (07 Dec 2022 21:23)  HR: 84 (08 Dec 2022 13:06) (81 - 94)  BP: 147/97 (08 Dec 2022 13:06) (130/79 - 160/89)  BP(mean): --  RR: 18 (08 Dec 2022 13:06) (17 - 18)  SpO2: 97% (08 Dec 2022 13:06) (97% - 100%)    Parameters below as of 08 Dec 2022 13:06  Patient On (Oxygen Delivery Method): nasal cannula  O2 Flow (L/min): 2      PHYSICAL EXAM:  CONSTITUTIONAL: NAD, obese  EYES: PERRLA; conjunctiva and sclera clear  ENMT: MMM  NECK: Supple,  RESPIRATORY: Normal respiratory effort; CTAB  CARDIOVASCULAR: RRR, no JVD, no peripheral edema, +L avf site bandaged  ABDOMEN: Nontender to palpation, normoactive BS, no guarding/rigidity  MUSCLOSKELETAL:  no clubbing/cyanosis, no joint swelling or tenderness to palpation  PSYCH: A+O x 3, affect normal  SKIN:  Rt anterior chest Tunneled catheter , Lt upper ext Wound Vacc with brownish secretion       LABS:                        7.3    7.02  )-----------( 306      ( 08 Dec 2022 08:24 )             23.4     12-08    130<L>  |  91<L>  |  35<H>  ----------------------------<  142<H>  4.8   |  24  |  6.69<H>    Ca    9.9      08 Dec 2022 08:23  Mg     2.2     12-08                Culture - Blood (collected 06 Dec 2022 18:33)  Source: .Blood Blood-Catheter  Preliminary Report (07 Dec 2022 23:02):    No growth to date.    Culture - Blood (collected 06 Dec 2022 18:33)  Source: .Blood Blood-Peripheral  Preliminary Report (07 Dec 2022 23:02):    No growth to date.        RADIOLOGY & ADDITIONAL TESTS:    Imaging Personally Reviewed:    Electrocardiogram Personally Reviewed:    COORDINATION OF CARE:  Care Discussed with Consultants/Other Providers [Y/N]:  Prior or Outpatient Records Reviewed [Y/N]:

## 2022-12-08 NOTE — PROGRESS NOTE ADULT - PROBLEM SELECTOR PLAN 1
STABLE NOW ---> SPike fever on 12/6  Found to have MSSA bacteremia 2/2 possible AVF infection d/t noted purulence. Transferred to [facility] for further imaging. S/p empiric treatment with vanc (11/19) and cefepime (11/19). Continued on ancef. Leukocytosis improved. LUE negative for findings of DVT.     -blood culture - MSSA, cultures 11/24 positive,  FIRST NEGATIVE blood culture on 11/27.    AS per ID : Ancef 1GM IV q24h - for discharge dose three times weekly post HD 2GM, 2GM, 3GM through 1/8/23 for 6 weeks  - s/p R IJ shiley placed 11/21- now clotted- now s/p new shiley--> S/P Tunneled catheter by IR on 12/2  - s/p LUE graft excision on 11/23 now on wound vac -----> vasc surgery was contacted and they want pt to be dc with wound vac and will f/up with vascular surg oupt .  ID is ok with using the HD cathter for ABX .    - TTE unchanged from 2019, ALBINO no vegetations    FEVER WOrk up due to spike on 12/6 ---> ID f/p on 12/7

## 2022-12-08 NOTE — DISCHARGE NOTE PROVIDER - DETAILS OF MALNUTRITION DIAGNOSIS/DIAGNOSES
This patient has been assessed with a concern for Malnutrition and was treated during this hospitalization for the following Nutrition diagnosis/diagnoses:     -  12/01/2022: Morbid obesity (BMI > 40)

## 2022-12-09 ENCOUNTER — TRANSCRIPTION ENCOUNTER (OUTPATIENT)
Age: 42
End: 2022-12-09

## 2022-12-09 VITALS
HEART RATE: 91 BPM | OXYGEN SATURATION: 97 % | DIASTOLIC BLOOD PRESSURE: 80 MMHG | RESPIRATION RATE: 18 BRPM | SYSTOLIC BLOOD PRESSURE: 147 MMHG | TEMPERATURE: 99 F

## 2022-12-09 LAB
ANION GAP SERPL CALC-SCNC: 16 MMOL/L — SIGNIFICANT CHANGE UP (ref 5–17)
BUN SERPL-MCNC: 46 MG/DL — HIGH (ref 7–23)
CALCIUM SERPL-MCNC: 9.9 MG/DL — SIGNIFICANT CHANGE UP (ref 8.4–10.5)
CHLORIDE SERPL-SCNC: 89 MMOL/L — LOW (ref 96–108)
CO2 SERPL-SCNC: 23 MMOL/L — SIGNIFICANT CHANGE UP (ref 22–31)
CREAT SERPL-MCNC: 9.06 MG/DL — HIGH (ref 0.5–1.3)
EGFR: 7 ML/MIN/1.73M2 — LOW
GLUCOSE SERPL-MCNC: 143 MG/DL — HIGH (ref 70–99)
MAGNESIUM SERPL-MCNC: 2.3 MG/DL — SIGNIFICANT CHANGE UP (ref 1.6–2.6)
POTASSIUM SERPL-MCNC: 5 MMOL/L — SIGNIFICANT CHANGE UP (ref 3.5–5.3)
POTASSIUM SERPL-SCNC: 5 MMOL/L — SIGNIFICANT CHANGE UP (ref 3.5–5.3)
SODIUM SERPL-SCNC: 128 MMOL/L — LOW (ref 135–145)

## 2022-12-09 PROCEDURE — 80048 BASIC METABOLIC PNL TOTAL CA: CPT

## 2022-12-09 PROCEDURE — 87150 DNA/RNA AMPLIFIED PROBE: CPT

## 2022-12-09 PROCEDURE — C1894: CPT

## 2022-12-09 PROCEDURE — 93312 ECHO TRANSESOPHAGEAL: CPT

## 2022-12-09 PROCEDURE — 77001 FLUOROGUIDE FOR VEIN DEVICE: CPT

## 2022-12-09 PROCEDURE — 97116 GAIT TRAINING THERAPY: CPT

## 2022-12-09 PROCEDURE — 85025 COMPLETE CBC W/AUTO DIFF WBC: CPT

## 2022-12-09 PROCEDURE — 82435 ASSAY OF BLOOD CHLORIDE: CPT

## 2022-12-09 PROCEDURE — 85652 RBC SED RATE AUTOMATED: CPT

## 2022-12-09 PROCEDURE — 86038 ANTINUCLEAR ANTIBODIES: CPT

## 2022-12-09 PROCEDURE — 87186 SC STD MICRODIL/AGAR DIL: CPT

## 2022-12-09 PROCEDURE — 86376 MICROSOMAL ANTIBODY EACH: CPT

## 2022-12-09 PROCEDURE — 83550 IRON BINDING TEST: CPT

## 2022-12-09 PROCEDURE — 86923 COMPATIBILITY TEST ELECTRIC: CPT

## 2022-12-09 PROCEDURE — 97535 SELF CARE MNGMENT TRAINING: CPT

## 2022-12-09 PROCEDURE — 76705 ECHO EXAM OF ABDOMEN: CPT

## 2022-12-09 PROCEDURE — 84466 ASSAY OF TRANSFERRIN: CPT

## 2022-12-09 PROCEDURE — 0225U NFCT DS DNA&RNA 21 SARSCOV2: CPT

## 2022-12-09 PROCEDURE — 99261: CPT

## 2022-12-09 PROCEDURE — 93005 ELECTROCARDIOGRAM TRACING: CPT

## 2022-12-09 PROCEDURE — 97164 PT RE-EVAL EST PLAN CARE: CPT

## 2022-12-09 PROCEDURE — 87070 CULTURE OTHR SPECIMN AEROBIC: CPT

## 2022-12-09 PROCEDURE — 87640 STAPH A DNA AMP PROBE: CPT

## 2022-12-09 PROCEDURE — 83520 IMMUNOASSAY QUANT NOS NONAB: CPT

## 2022-12-09 PROCEDURE — 36415 COLL VENOUS BLD VENIPUNCTURE: CPT

## 2022-12-09 PROCEDURE — 97530 THERAPEUTIC ACTIVITIES: CPT

## 2022-12-09 PROCEDURE — 97605 NEG PRS WND THER DME<=50SQCM: CPT

## 2022-12-09 PROCEDURE — 74177 CT ABD & PELVIS W/CONTRAST: CPT

## 2022-12-09 PROCEDURE — 83690 ASSAY OF LIPASE: CPT

## 2022-12-09 PROCEDURE — 36558 INSERT TUNNELED CV CATH: CPT

## 2022-12-09 PROCEDURE — 97110 THERAPEUTIC EXERCISES: CPT

## 2022-12-09 PROCEDURE — C1889: CPT

## 2022-12-09 PROCEDURE — 84132 ASSAY OF SERUM POTASSIUM: CPT

## 2022-12-09 PROCEDURE — 82947 ASSAY GLUCOSE BLOOD QUANT: CPT

## 2022-12-09 PROCEDURE — 82330 ASSAY OF CALCIUM: CPT

## 2022-12-09 PROCEDURE — 86036 ANCA SCREEN EACH ANTIBODY: CPT

## 2022-12-09 PROCEDURE — C1750: CPT

## 2022-12-09 PROCEDURE — 72126 CT NECK SPINE W/DYE: CPT

## 2022-12-09 PROCEDURE — 71260 CT THORAX DX C+: CPT

## 2022-12-09 PROCEDURE — 86255 FLUORESCENT ANTIBODY SCREEN: CPT

## 2022-12-09 PROCEDURE — 86901 BLOOD TYPING SEROLOGIC RH(D): CPT

## 2022-12-09 PROCEDURE — 83605 ASSAY OF LACTIC ACID: CPT

## 2022-12-09 PROCEDURE — 85027 COMPLETE CBC AUTOMATED: CPT

## 2022-12-09 PROCEDURE — 84295 ASSAY OF SERUM SODIUM: CPT

## 2022-12-09 PROCEDURE — 80053 COMPREHEN METABOLIC PANEL: CPT

## 2022-12-09 PROCEDURE — 85610 PROTHROMBIN TIME: CPT

## 2022-12-09 PROCEDURE — 83036 HEMOGLOBIN GLYCOSYLATED A1C: CPT

## 2022-12-09 PROCEDURE — 85730 THROMBOPLASTIN TIME PARTIAL: CPT

## 2022-12-09 PROCEDURE — 82977 ASSAY OF GGT: CPT

## 2022-12-09 PROCEDURE — U0003: CPT

## 2022-12-09 PROCEDURE — 93306 TTE W/DOPPLER COMPLETE: CPT

## 2022-12-09 PROCEDURE — U0005: CPT

## 2022-12-09 PROCEDURE — 88305 TISSUE EXAM BY PATHOLOGIST: CPT

## 2022-12-09 PROCEDURE — 73030 X-RAY EXAM OF SHOULDER: CPT

## 2022-12-09 PROCEDURE — 85014 HEMATOCRIT: CPT

## 2022-12-09 PROCEDURE — 97166 OT EVAL MOD COMPLEX 45 MIN: CPT

## 2022-12-09 PROCEDURE — 80061 LIPID PANEL: CPT

## 2022-12-09 PROCEDURE — 84100 ASSAY OF PHOSPHORUS: CPT

## 2022-12-09 PROCEDURE — 82728 ASSAY OF FERRITIN: CPT

## 2022-12-09 PROCEDURE — 93970 EXTREMITY STUDY: CPT

## 2022-12-09 PROCEDURE — 73206 CT ANGIO UPR EXTRM W/O&W/DYE: CPT

## 2022-12-09 PROCEDURE — C1769: CPT

## 2022-12-09 PROCEDURE — 87040 BLOOD CULTURE FOR BACTERIA: CPT

## 2022-12-09 PROCEDURE — 87641 MR-STAPH DNA AMP PROBE: CPT

## 2022-12-09 PROCEDURE — C8929: CPT

## 2022-12-09 PROCEDURE — C1752: CPT

## 2022-12-09 PROCEDURE — 85379 FIBRIN DEGRADATION QUANT: CPT

## 2022-12-09 PROCEDURE — 86850 RBC ANTIBODY SCREEN: CPT

## 2022-12-09 PROCEDURE — 86900 BLOOD TYPING SEROLOGIC ABO: CPT

## 2022-12-09 PROCEDURE — C9399: CPT

## 2022-12-09 PROCEDURE — 82962 GLUCOSE BLOOD TEST: CPT

## 2022-12-09 PROCEDURE — 82803 BLOOD GASES ANY COMBINATION: CPT

## 2022-12-09 PROCEDURE — 94640 AIRWAY INHALATION TREATMENT: CPT

## 2022-12-09 PROCEDURE — 99239 HOSP IP/OBS DSCHRG MGMT >30: CPT

## 2022-12-09 PROCEDURE — 83540 ASSAY OF IRON: CPT

## 2022-12-09 PROCEDURE — P9016: CPT

## 2022-12-09 PROCEDURE — 82607 VITAMIN B-12: CPT

## 2022-12-09 PROCEDURE — 82746 ASSAY OF FOLIC ACID SERUM: CPT

## 2022-12-09 PROCEDURE — 74018 RADEX ABDOMEN 1 VIEW: CPT

## 2022-12-09 PROCEDURE — 71045 X-RAY EXAM CHEST 1 VIEW: CPT

## 2022-12-09 PROCEDURE — 86140 C-REACTIVE PROTEIN: CPT

## 2022-12-09 PROCEDURE — 85018 HEMOGLOBIN: CPT

## 2022-12-09 PROCEDURE — 80074 ACUTE HEPATITIS PANEL: CPT

## 2022-12-09 PROCEDURE — 97161 PT EVAL LOW COMPLEX 20 MIN: CPT

## 2022-12-09 PROCEDURE — 83735 ASSAY OF MAGNESIUM: CPT

## 2022-12-09 PROCEDURE — 73560 X-RAY EXAM OF KNEE 1 OR 2: CPT

## 2022-12-09 PROCEDURE — 97606 NEG PRS WND THER DME>50 SQCM: CPT

## 2022-12-09 RX ORDER — HYDROMORPHONE HYDROCHLORIDE 2 MG/ML
1 INJECTION INTRAMUSCULAR; INTRAVENOUS; SUBCUTANEOUS
Qty: 15 | Refills: 0
Start: 2022-12-09 | End: 2022-12-13

## 2022-12-09 RX ORDER — SOD,AMMONIUM,POTASSIUM LACTATE
1 CREAM (GRAM) TOPICAL
Qty: 0 | Refills: 0 | DISCHARGE

## 2022-12-09 RX ORDER — FERROUS SULFATE 325(65) MG
325 TABLET ORAL DAILY
Refills: 0 | Status: DISCONTINUED | OUTPATIENT
Start: 2022-12-09 | End: 2022-12-09

## 2022-12-09 RX ORDER — DICLOFENAC SODIUM 30 MG/G
1 GEL TOPICAL
Qty: 0 | Refills: 0 | DISCHARGE

## 2022-12-09 RX ORDER — HYDROXYZINE HCL 10 MG
2 TABLET ORAL
Qty: 0 | Refills: 0 | DISCHARGE

## 2022-12-09 RX ORDER — ONDANSETRON 8 MG/1
4 TABLET, FILM COATED ORAL ONCE
Refills: 0 | Status: COMPLETED | OUTPATIENT
Start: 2022-12-09 | End: 2022-12-09

## 2022-12-09 RX ORDER — POLYETHYLENE GLYCOL 3350 17 G/17G
17 POWDER, FOR SOLUTION ORAL
Qty: 0 | Refills: 0 | DISCHARGE
Start: 2022-12-09

## 2022-12-09 RX ADMIN — ONDANSETRON 4 MILLIGRAM(S): 8 TABLET, FILM COATED ORAL at 08:57

## 2022-12-09 RX ADMIN — HEPARIN SODIUM 5000 UNIT(S): 5000 INJECTION INTRAVENOUS; SUBCUTANEOUS at 05:47

## 2022-12-09 RX ADMIN — CHLORHEXIDINE GLUCONATE 1 APPLICATION(S): 213 SOLUTION TOPICAL at 14:01

## 2022-12-09 RX ADMIN — ERYTHROPOIETIN 20000 UNIT(S): 10000 INJECTION, SOLUTION INTRAVENOUS; SUBCUTANEOUS at 10:22

## 2022-12-09 RX ADMIN — HYDROMORPHONE HYDROCHLORIDE 2 MILLIGRAM(S): 2 INJECTION INTRAMUSCULAR; INTRAVENOUS; SUBCUTANEOUS at 08:19

## 2022-12-09 RX ADMIN — HYDROMORPHONE HYDROCHLORIDE 2 MILLIGRAM(S): 2 INJECTION INTRAMUSCULAR; INTRAVENOUS; SUBCUTANEOUS at 03:46

## 2022-12-09 RX ADMIN — HYDROMORPHONE HYDROCHLORIDE 0.5 MILLIGRAM(S): 2 INJECTION INTRAMUSCULAR; INTRAVENOUS; SUBCUTANEOUS at 01:40

## 2022-12-09 RX ADMIN — SEVELAMER CARBONATE 800 MILLIGRAM(S): 2400 POWDER, FOR SUSPENSION ORAL at 08:19

## 2022-12-09 RX ADMIN — HYDROMORPHONE HYDROCHLORIDE 0.5 MILLIGRAM(S): 2 INJECTION INTRAMUSCULAR; INTRAVENOUS; SUBCUTANEOUS at 01:10

## 2022-12-09 RX ADMIN — HYDROMORPHONE HYDROCHLORIDE 2 MILLIGRAM(S): 2 INJECTION INTRAMUSCULAR; INTRAVENOUS; SUBCUTANEOUS at 15:27

## 2022-12-09 RX ADMIN — SEVELAMER CARBONATE 800 MILLIGRAM(S): 2400 POWDER, FOR SUSPENSION ORAL at 17:54

## 2022-12-09 RX ADMIN — SEVELAMER CARBONATE 800 MILLIGRAM(S): 2400 POWDER, FOR SUSPENSION ORAL at 14:03

## 2022-12-09 RX ADMIN — HYDROMORPHONE HYDROCHLORIDE 2 MILLIGRAM(S): 2 INJECTION INTRAMUSCULAR; INTRAVENOUS; SUBCUTANEOUS at 04:16

## 2022-12-09 RX ADMIN — Medication 600 MILLIGRAM(S): at 08:19

## 2022-12-09 RX ADMIN — NALOXEGOL OXALATE 12.5 MILLIGRAM(S): 12.5 TABLET, FILM COATED ORAL at 14:02

## 2022-12-09 RX ADMIN — HEPARIN SODIUM 5000 UNIT(S): 5000 INJECTION INTRAVENOUS; SUBCUTANEOUS at 17:54

## 2022-12-09 RX ADMIN — Medication 325 MILLIGRAM(S): at 17:53

## 2022-12-09 RX ADMIN — Medication 20 MILLIGRAM(S): at 14:02

## 2022-12-09 RX ADMIN — RISPERIDONE 2 MILLIGRAM(S): 4 TABLET ORAL at 14:02

## 2022-12-09 NOTE — PROGRESS NOTE ADULT - PROVIDER SPECIALTY LIST ADULT
Anesthesia
Hospitalist
Infectious Disease
Nephrology
Vascular Surgery
Anesthesia
Anesthesia
Infectious Disease
Infectious Disease
Nephrology
Anesthesia
Anesthesia
Infectious Disease
Infectious Disease
Intervent Radiology
Nephrology
Trauma Surgery
Vascular Surgery
Hospitalist
Infectious Disease
Intervent Radiology
Nephrology
Trauma Surgery
Vascular Surgery
Hospitalist
Internal Medicine
Hospitalist
Internal Medicine
Hospitalist
Internal Medicine
Hospitalist
Internal Medicine
Hospitalist

## 2022-12-09 NOTE — DISCHARGE NOTE NURSING/CASE MANAGEMENT/SOCIAL WORK - NSDCVIVACCINE_GEN_ALL_CORE_FT
influenza, injectable, quadrivalent, preservative free; 07-Apr-2016 12:13; Halle Ellsworth (RN); [employer]; [id number]; IntraMuscular; Deltoid Left.; 0.5 milliLiter(s); VIS (VIS Published: 07-Aug-2015, VIS Presented: 07-Apr-2016);   influenza, injectable, quadrivalent, preservative free; 06-Dec-2018 14:41; Rhonda Kern (RN); [employer]; [id number] (Exp. Date: 30-Jun-2019); IntraMuscular; Deltoid Right.; 0.5 milliLiter(s); VIS (VIS Published: 07-Aug-2015, VIS Presented: 06-Dec-2018);   influenza, injectable, quadrivalent, preservative free; 05-Mar-2019 16:51; Jace Best (RN); [employer]; [id number] (Exp. Date: 30-Jun-2019); IntraMuscular; Deltoid Right.; 0.5 milliLiter(s); VIS (VIS Published: 07-Aug-2015, VIS Presented: 05-Mar-2019);   influenza, injectable, quadrivalent, preservative free; 19-Dec-2020 12:14; Delmer Weinberg (RN); [employer]; [id number] (Exp. Date: 30-Jun-2021); IntraMuscular; Deltoid Right.; 0.5 milliLiter(s); VIS (VIS Published: 15-Aug-2019, VIS Presented: 19-Dec-2020);

## 2022-12-09 NOTE — DISCHARGE NOTE NURSING/CASE MANAGEMENT/SOCIAL WORK - PATIENT PORTAL LINK FT
You can access the FollowMyHealth Patient Portal offered by [facility] by registering at the following website: [email or web address]. By joining [facility]’s FollowMyHealth portal, you will also be able to view your health information using other applications (apps) compatible with our system.

## 2022-12-09 NOTE — PROGRESS NOTE ADULT - REASON FOR ADMISSION
fevers and chills

## 2022-12-09 NOTE — PROGRESS NOTE ADULT - PROBLEM SELECTOR PLAN 1
STABLE NOW ---> SPike fever on 12/6  Found to have MSSA bacteremia 2/2 possible AVF infection d/t noted purulence. Transferred to [facility] for further imaging. S/p empiric treatment with vanc (11/19) and cefepime (11/19). Continued on ancef. Leukocytosis improved. LUE negative for findings of DVT.     -blood culture - MSSA, cultures 11/24 positive,  FIRST NEGATIVE blood culture on 11/27.    AS per ID : Ancef 1GM IV q24h - for discharge dose three times weekly post HD 2GM, 2GM, 3GM through 1/8/23 for 6 weeks  - s/p R IJ shiley placed 11/21- now clotted- now s/p new shiley--> S/P Tunneled catheter by IR on 12/2  - s/p LUE graft excision on 11/23 now on wound vac -----> vasc surgery was contacted and they want pt to be dc with wound vac and will f/up with vascular surg oupt .  ID is ok with using the HD cathter for ABX .    - TTE unchanged from 2019, ALBINO no vegetations    FEVER WOrk up due to spike on 12/6 ---> ID f/p on 12/7 STABLE NOW ---> SPike fever on 12/6  Found to have MSSA bacteremia 2/2 possible AVF infection d/t noted purulence. Transferred to [facility] for further imaging. S/p empiric treatment with vanc (11/19) and cefepime (11/19). Continued on ancef. Leukocytosis improved. LUE negative for findings of DVT.     -blood culture - MSSA, cultures 11/24 positive,  FIRST NEGATIVE blood culture on 11/27.    AS per ID : Ancef 1GM IV q24h - for discharge dose three times weekly post HD 2GM, 2GM, 3GM through 1/8/23 for 6 weeks  - s/p R IJ shiley placed 11/21- now clotted- now s/p new shiley--> S/P Tunneled catheter by IR on 12/2  - s/p LUE graft excision on 11/23 now on wound vac -----> vasc surgery was contacted and they want pt to be dc with wound vac and will f/up with vascular surg oupt .  ID is ok with using the HD cathter for ABX .    - TTE unchanged from 2019, ALBINO no vegetations    FEVER WOrk up due to spike on 12/6 ---> ID f/p on 12/7---> neg work up , case was discussed with the ID who is ok with home DC   DC home once HD center is secured.  F/UP with ID, Nephro and PCP   DC time 45mns
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
## 2022-12-09 NOTE — PROGRESS NOTE ADULT - NSPROGADDITIONALINFOA_GEN_ALL_CORE
patient seen and examined  for urgent graft excision
f/u fever work up , Repeat CBC and serum Iron and will transfuse if hem <7.        Huong Ruth M.D.  Division of Hospital Medicine   Pager :  [phone]/TEAMS
awaiting on HD placement for outpt       Huong Ruth M.D.  Division of Hospital Medicine   Pager :  [phone]/TEAMS
f/u fever work up       Huong Ruth M.D.  Division of Hospital Medicine   Pager :  [phone]/TEAMS
Monitor H/H       Huong Ruth M.D.  Division of Hospital Medicine  Available on MS teams until 7pm   Pager :  [phone]
f/u fever work up , Repeat CBC and serum Iron and will transfuse if hem <7.        Huong Ruth M.D.  Division of Hospital Medicine   Pager :  [phone]/TEAMS

## 2022-12-09 NOTE — PROGRESS NOTE ADULT - PROBLEM SELECTOR PROBLEM 3
ESRD on dialysis

## 2022-12-09 NOTE — PROGRESS NOTE ADULT - PROBLEM SELECTOR PLAN 9
On ventolin, symbicort, duoneb. On 4L NC at home    - c/w all home meds

## 2022-12-09 NOTE — PROGRESS NOTE ADULT - PROBLEM SELECTOR PROBLEM 1
Severe sepsis
ESRD on dialysis
Severe sepsis

## 2022-12-09 NOTE — PROGRESS NOTE ADULT - SUBJECTIVE AND OBJECTIVE BOX
Patient is a 42y old  Male who presents with a chief complaint of fevers and chills (09 Dec 2022 12:16)      SUBJECTIVE / OVERNIGHT EVENTS:   No overnight events       ADDITIONAL REVIEW OF SYSTEMS: Negative except for above    MEDICATIONS  (STANDING):  ceFAZolin   IVPB 1000 milliGRAM(s) IV Intermittent every 24 hours  chlorhexidine 4% Liquid 1 Application(s) Topical daily  epoetin cyndie-epbx (RETACRIT) Injectable 17985 Unit(s) IV Push <User Schedule>  ferrous    sulfate 325 milliGRAM(s) Oral daily  guaiFENesin  milliGRAM(s) Oral every 12 hours  heparin   Injectable 5000 Unit(s) SubCutaneous every 12 hours  mirtazapine 15 milliGRAM(s) Oral at bedtime  naloxegol 12.5 milliGRAM(s) Oral daily  PARoxetine 20 milliGRAM(s) Oral daily  polyethylene glycol 3350 17 Gram(s) Oral two times a day  QUEtiapine 300 milliGRAM(s) Oral at bedtime  risperiDONE   Tablet 2 milliGRAM(s) Oral daily  senna 2 Tablet(s) Oral at bedtime  sevelamer carbonate 800 milliGRAM(s) Oral three times a day with meals  simvastatin 40 milliGRAM(s) Oral at bedtime    MEDICATIONS  (PRN):  benzonatate 100 milliGRAM(s) Oral every 8 hours PRN Cough  HYDROmorphone   Tablet 2 milliGRAM(s) Oral every 4 hours PRN Moderate Pain (4 - 6)  sodium chloride 0.9% lock flush 10 milliLiter(s) IV Push every 1 hour PRN Pre/post blood products, medications, blood draw, and to maintain line patency      CAPILLARY BLOOD GLUCOSE        I&O's Summary    08 Dec 2022 07:01  -  09 Dec 2022 07:00  --------------------------------------------------------  IN: 1098 mL / OUT: 0 mL / NET: 1098 mL        PHYSICAL EXAM:  Vital Signs Last 24 Hrs  T(C): 36.4 (09 Dec 2022 09:05), Max: 37.1 (08 Dec 2022 21:28)  T(F): 97.5 (09 Dec 2022 09:05), Max: 98.7 (08 Dec 2022 21:28)  HR: 81 (09 Dec 2022 09:05) (81 - 88)  BP: 175/103 (09 Dec 2022 09:05) (147/97 - 175/103)  BP(mean): --  RR: 18 (09 Dec 2022 09:05) (18 - 18)  SpO2: 99% (09 Dec 2022 09:05) (97% - 99%)    Parameters below as of 09 Dec 2022 09:05  Patient On (Oxygen Delivery Method): nasal cannula  O2 Flow (L/min): 2      PHYSICAL EXAM:  CONSTITUTIONAL: NAD, obese  EYES: PERRLA; conjunctiva and sclera clear  ENMT: MMM  NECK: Supple,  RESPIRATORY: Normal respiratory effort; CTAB  CARDIOVASCULAR: RRR, no JVD, no peripheral edema, +L avf site bandaged  ABDOMEN: Nontender to palpation, normoactive BS, no guarding/rigidity  MUSCLOSKELETAL:  no clubbing/cyanosis, no joint swelling or tenderness to palpation  PSYCH: A+O x 3, affect normal  SKIN:  Rt anterior chest Tunneled catheter , Lt upper ext Wound Vacc with brownish secretion       LABS:                        7.3    7.02  )-----------( 306      ( 08 Dec 2022 08:24 )             23.4     12-09    128<L>  |  89<L>  |  46<H>  ----------------------------<  143<H>  5.0   |  23  |  9.06<H>    Ca    9.9      09 Dec 2022 10:18  Mg     2.3     12-09                Culture - Blood (collected 06 Dec 2022 18:33)  Source: .Blood Blood-Catheter  Preliminary Report (07 Dec 2022 23:02):    No growth to date.    Culture - Blood (collected 06 Dec 2022 18:33)  Source: .Blood Blood-Peripheral  Preliminary Report (07 Dec 2022 23:02):    No growth to date.        RADIOLOGY & ADDITIONAL TESTS:    Imaging Personally Reviewed:    Electrocardiogram Personally Reviewed:    COORDINATION OF CARE:  Care Discussed with Consultants/Other Providers [Y/N]:  Prior or Outpatient Records Reviewed [Y/N]:     Patient is a 42y old  Male who presents with a chief complaint of fevers and chills (09 Dec 2022 12:16)      SUBJECTIVE / OVERNIGHT EVENTS:   No overnight events   pt had one BM last night       ADDITIONAL REVIEW OF SYSTEMS: Negative except for above    MEDICATIONS  (STANDING):  ceFAZolin   IVPB 1000 milliGRAM(s) IV Intermittent every 24 hours  chlorhexidine 4% Liquid 1 Application(s) Topical daily  epoetin cyndie-epbx (RETACRIT) Injectable 69306 Unit(s) IV Push <User Schedule>  ferrous    sulfate 325 milliGRAM(s) Oral daily  guaiFENesin  milliGRAM(s) Oral every 12 hours  heparin   Injectable 5000 Unit(s) SubCutaneous every 12 hours  mirtazapine 15 milliGRAM(s) Oral at bedtime  naloxegol 12.5 milliGRAM(s) Oral daily  PARoxetine 20 milliGRAM(s) Oral daily  polyethylene glycol 3350 17 Gram(s) Oral two times a day  QUEtiapine 300 milliGRAM(s) Oral at bedtime  risperiDONE   Tablet 2 milliGRAM(s) Oral daily  senna 2 Tablet(s) Oral at bedtime  sevelamer carbonate 800 milliGRAM(s) Oral three times a day with meals  simvastatin 40 milliGRAM(s) Oral at bedtime    MEDICATIONS  (PRN):  benzonatate 100 milliGRAM(s) Oral every 8 hours PRN Cough  HYDROmorphone   Tablet 2 milliGRAM(s) Oral every 4 hours PRN Moderate Pain (4 - 6)  sodium chloride 0.9% lock flush 10 milliLiter(s) IV Push every 1 hour PRN Pre/post blood products, medications, blood draw, and to maintain line patency      CAPILLARY BLOOD GLUCOSE        I&O's Summary    08 Dec 2022 07:01  -  09 Dec 2022 07:00  --------------------------------------------------------  IN: 1098 mL / OUT: 0 mL / NET: 1098 mL        PHYSICAL EXAM:  Vital Signs Last 24 Hrs  T(C): 36.4 (09 Dec 2022 09:05), Max: 37.1 (08 Dec 2022 21:28)  T(F): 97.5 (09 Dec 2022 09:05), Max: 98.7 (08 Dec 2022 21:28)  HR: 81 (09 Dec 2022 09:05) (81 - 88)  BP: 175/103 (09 Dec 2022 09:05) (147/97 - 175/103)  BP(mean): --  RR: 18 (09 Dec 2022 09:05) (18 - 18)  SpO2: 99% (09 Dec 2022 09:05) (97% - 99%)    Parameters below as of 09 Dec 2022 09:05  Patient On (Oxygen Delivery Method): nasal cannula  O2 Flow (L/min): 2      PHYSICAL EXAM:  CONSTITUTIONAL: NAD, obese  EYES: PERRLA; conjunctiva and sclera clear  ENMT: MMM  NECK: Supple,  RESPIRATORY: Normal respiratory effort; CTAB  CARDIOVASCULAR: RRR, no JVD, no peripheral edema, +L avf site bandaged  ABDOMEN: Nontender to palpation, normoactive BS, no guarding/rigidity  MUSCLOSKELETAL:  no clubbing/cyanosis, no joint swelling or tenderness to palpation  PSYCH: A+O x 3, affect normal  SKIN:  Rt anterior chest Tunneled catheter , Lt upper ext Wound Vacc with brownish secretion       LABS:                        7.3    7.02  )-----------( 306      ( 08 Dec 2022 08:24 )             23.4     12-09    128<L>  |  89<L>  |  46<H>  ----------------------------<  143<H>  5.0   |  23  |  9.06<H>    Ca    9.9      09 Dec 2022 10:18  Mg     2.3     12-09                Culture - Blood (collected 06 Dec 2022 18:33)  Source: .Blood Blood-Catheter  Preliminary Report (07 Dec 2022 23:02):    No growth to date.    Culture - Blood (collected 06 Dec 2022 18:33)  Source: .Blood Blood-Peripheral  Preliminary Report (07 Dec 2022 23:02):    No growth to date.        RADIOLOGY & ADDITIONAL TESTS:    Imaging Personally Reviewed:    Electrocardiogram Personally Reviewed:    COORDINATION OF CARE:  Care Discussed with Consultants/Other Providers [Y/N]:  Prior or Outpatient Records Reviewed [Y/N]:

## 2022-12-09 NOTE — PROGRESS NOTE ADULT - PROBLEM SELECTOR PROBLEM 10
Pain of shoulder after trauma

## 2022-12-09 NOTE — PROGRESS NOTE ADULT - NUTRITIONAL ASSESSMENT
This patient has been assessed with a concern for Malnutrition and has been determined to have a diagnosis/diagnoses of Morbid obesity (BMI > 40).    This patient is being managed with:   Diet Regular-  For patients receiving Renal Replacement - No Protein Restr No Conc K No Conc Phos Low Sodium (RENAL)  Entered: Dec  2 2022  5:15PM    
This patient has been assessed with a concern for Malnutrition and has been determined to have a diagnosis/diagnoses of Morbid obesity (BMI > 40).    This patient is being managed with:   Diet NPO-  Except Medications  Entered: Dec  1 2022  8:36PM    
This patient has been assessed with a concern for Malnutrition and has been determined to have a diagnosis/diagnoses of Morbid obesity (BMI > 40).    This patient is being managed with:   Diet Regular-  For patients receiving Renal Replacement - No Protein Restr No Conc K No Conc Phos Low Sodium (RENAL)  Entered: Nov 30 2022  4:00PM    
This patient has been assessed with a concern for Malnutrition and has been determined to have a diagnosis/diagnoses of Morbid obesity (BMI > 40).    This patient is being managed with:   Diet Regular-  For patients receiving Renal Replacement - No Protein Restr No Conc K No Conc Phos Low Sodium (RENAL)  Entered: Dec  2 2022  5:15PM    

## 2022-12-09 NOTE — PROGRESS NOTE ADULT - ASSESSMENT
42-year-old male with past medical history ESRD on HD (at home, M to F via AV graft), COPD on home oxygen 4L, bipolar disorder, schizophrenia presents with fever and chills x1 day. Patient states he had a fever of up to 104 at home and in [facility] ED was Tmax 100.6    ESRD on HD  Infected access, MSSA bacteremia, Septic Pulm emboli  S/P AVG excision 11/23/22  On Cefazolin per Infectious disease specialist  s/p Permacath placement by IR  seen on HD today- tolerating well--> high bp which is improving with hd--> uf 3kg   Plan for next HD tomm again to help with fluid removal and clearances then place back on MWF from next week  hold off avf for now after d/w vascular--> avf outpt  Outpt HD set up at [facility]  Renal diet  Daily BMP    HTN  UF on HD  overall bp better    Anemia of CKD  Epo during HD  20k tiw with hd  trend hgb    BMD of CKD  Hyperphosphatemia- phos is 5.6  renvela with meals  trend etta phos    Hyponatremia  likely volume related  UF on HD  trend na      Dr Mauricio  [phone]

## 2022-12-09 NOTE — PROGRESS NOTE ADULT - SUBJECTIVE AND OBJECTIVE BOX
Patient seen and examined  no complaints    aspirin (Swelling)  fish (Unknown)  penicillins (Swelling)  shellfish (Rash)    Hospital Medications:   MEDICATIONS  (STANDING):  ceFAZolin   IVPB 1000 milliGRAM(s) IV Intermittent every 24 hours  chlorhexidine 4% Liquid 1 Application(s) Topical daily  epoetin cyndie-epbx (RETACRIT) Injectable 18328 Unit(s) IV Push <User Schedule>  ferrous    sulfate 325 milliGRAM(s) Oral daily  guaiFENesin  milliGRAM(s) Oral every 12 hours  heparin   Injectable 5000 Unit(s) SubCutaneous every 12 hours  mirtazapine 15 milliGRAM(s) Oral at bedtime  naloxegol 12.5 milliGRAM(s) Oral daily  PARoxetine 20 milliGRAM(s) Oral daily  polyethylene glycol 3350 17 Gram(s) Oral two times a day  QUEtiapine 300 milliGRAM(s) Oral at bedtime  risperiDONE   Tablet 2 milliGRAM(s) Oral daily  senna 2 Tablet(s) Oral at bedtime  sevelamer carbonate 800 milliGRAM(s) Oral three times a day with meals  simvastatin 40 milliGRAM(s) Oral at bedtime        VITALS:  T(F): 97.5 (12-09-22 @ 09:05), Max: 98.7 (12-08-22 @ 21:28)  HR: 81 (12-09-22 @ 09:05)  BP: 175/103 (12-09-22 @ 09:05)  RR: 18 (12-09-22 @ 09:05)  SpO2: 99% (12-09-22 @ 09:05)  Wt(kg): --    12-08 @ 07:01  -  12-09 @ 07:00  --------------------------------------------------------  IN: 1098 mL / OUT: 0 mL / NET: 1098 mL      Physical Exam :-  Constitutional: NAD  Neck: Supple.  Respiratory: Bilateral equal breath sounds,  Cardiovascular: S1, S2 normal,  Gastrointestinal: Bowel Sounds present, soft, non tender.  Extremities: 1+ edema  Neurological: Alert and Oriented x 3, no focal deficits  Psychiatric: Normal mood, normal affect    LABS:  12-09    128<L>  |  89<L>  |  46<H>  ----------------------------<  143<H>  5.0   |  23  |  9.06<H>    Ca    9.9      09 Dec 2022 10:18  Mg     2.3     12-09      Creatinine Trend: 9.06 <--, 6.69 <--, 8.80 <--, 6.72 <--, 6.97 <--                        7.3    7.02  )-----------( 306      ( 08 Dec 2022 08:24 )             23.4     Urine Studies:      RADIOLOGY & ADDITIONAL STUDIES:

## 2022-12-09 NOTE — PROGRESS NOTE ADULT - ASSESSMENT
42-year-old male with past medical history ESRD on HD (at home, M to F via AV graft), COPD on home oxygen 4L, bipolar disorder, schizophrenia presented to [place] fever and chills x1 day. Transferred to [facility] for continued MSSA bacteremia management and further imaging, now s/p LUE AV graft excision 11/23 now s/p ALBINO without vegetation, now s/p shiley to permacath conversion.

## 2022-12-10 RX ORDER — HYDROMORPHONE HYDROCHLORIDE 2 MG/ML
1 INJECTION INTRAMUSCULAR; INTRAVENOUS; SUBCUTANEOUS
Qty: 15 | Refills: 0
Start: 2022-12-10 | End: 2022-12-14

## 2022-12-13 ENCOUNTER — NON-APPOINTMENT (OUTPATIENT)
Age: 42
End: 2022-12-13

## 2022-12-14 ENCOUNTER — NON-APPOINTMENT (OUTPATIENT)
Age: 42
End: 2022-12-14

## 2022-12-14 NOTE — ED ADULT TRIAGE NOTE - WEIGHT IN LBS
Not in exacerbation  Satting well on room air  Plan  · Reordered home inhalers, Breo deric  , albuterol prn 363.9

## 2022-12-16 ENCOUNTER — NON-APPOINTMENT (OUTPATIENT)
Age: 42
End: 2022-12-16

## 2022-12-22 ENCOUNTER — APPOINTMENT (OUTPATIENT)
Dept: VASCULAR SURGERY | Facility: CLINIC | Age: 42
End: 2022-12-22
Payer: MEDICARE

## 2022-12-22 PROCEDURE — XXXXX: CPT | Mod: 1L

## 2022-12-23 NOTE — H&P ADULT - PROBLEM SELECTOR PLAN 5
c/w lantus and humalog at home doses  c/w sliding scale  f/u HbA1c, fingersticks  consistent carb diet No

## 2022-12-28 NOTE — PROGRESS NOTE ADULT - PROBLEM SELECTOR PLAN 4
Pt at home on novolog 15 units TID  Last Hba1c 5.5  Continue with sliding scale and Lantus 15 units at bedtime
Yes

## 2023-01-05 ENCOUNTER — APPOINTMENT (OUTPATIENT)
Dept: INFECTIOUS DISEASE | Facility: CLINIC | Age: 43
End: 2023-01-05
Payer: MEDICARE

## 2023-01-05 VITALS
SYSTOLIC BLOOD PRESSURE: 170 MMHG | HEART RATE: 85 BPM | HEIGHT: 78 IN | TEMPERATURE: 97.6 F | BODY MASS INDEX: 36.45 KG/M2 | DIASTOLIC BLOOD PRESSURE: 96 MMHG | OXYGEN SATURATION: 95 % | WEIGHT: 315 LBS

## 2023-01-05 DIAGNOSIS — R78.81 BACTEREMIA: ICD-10-CM

## 2023-01-05 DIAGNOSIS — B95.61 BACTEREMIA: ICD-10-CM

## 2023-01-05 PROCEDURE — 99215 OFFICE O/P EST HI 40 MIN: CPT

## 2023-01-05 NOTE — ASSESSMENT
[FreeTextEntry1] : 42M hypertensive ESRD, BMI 48. \par MSSA bacteremia, infected AV graft in 11/2022. \par s/p excision 11/23, cultures cleared 11/27, ALBINO without vegetations. \par Permacath placed 12/2. \par Doing well. Woundvac still in place. \par \par Plan\par -6 weeks Ancef 2GM, 2GM, 3GM finishes around 1/8/23 \par -surveillance blood cultures around 1/16 \par -favor AVF creation over graft in the future \par -reports being up to date with Flu vaccine

## 2023-01-05 NOTE — HISTORY OF PRESENT ILLNESS
[FreeTextEntry1] : 42M hypertensive ESRD. \par Presented 11/19/22 with sepsis, high grade MSSA bacteremia from infected AV graft. \par He was doing dialysis on his own at home 5 days a week. \par Initially an AVF but a PTFE graft was placed 2/2021 for a pseudoaneursym revision. \par Excised without vegetations on ALBINO, on 6 weeks of Ancef, completing in a few days. \par Now going to a dialysis center, three days a week. \par Has a permacath. Saw vascular in Dec, going back end of the month. Unclear plan for AVF. \par Feels well. \par The woundvac is bothersome but working and he doesn't have pain. \par No diarrhea. \par No fevers or chills. \par No back or joint pain. \par We discussed MSSA and reviewed what happened during his hospitalization in detail. Discussed lower risk of AVF compared to AVG for infections.

## 2023-01-05 NOTE — PHYSICAL EXAM
[General Appearance - Alert] : alert [General Appearance - In No Acute Distress] : in no acute distress [Sclera] : the sclera and conjunctiva were normal [] : no respiratory distress [Respiration, Rhythm And Depth] : normal respiratory rhythm and effort [Heart Rate And Rhythm] : heart rate was normal and rhythm regular [Bowel Sounds] : normal bowel sounds [Abdomen Soft] : soft [FreeTextEntry1] : left arm woundvac without surrounding erythema or tenderness. right chest permacath dressed with gauze but no surrounding inflammation  [No Focal Deficits] : no focal deficits [Affect] : the affect was normal

## 2023-01-05 NOTE — REVIEW OF SYSTEMS
[Fever] : no fever [Chills] : no chills [Feeling Sick] : not feeling sick [Feeling Tired] : not feeling tired [Shortness Of Breath] : no shortness of breath [Cough] : no cough [Abdominal Pain] : no abdominal pain [Diarrhea] : no diarrhea [Joint Pain] : no joint pain [Skin Lesions] : no skin lesions

## 2023-01-11 NOTE — SBIRT NOTE ADULT - NSSBIRTSCREENAVAIL_GEN_A_CORE
Patient given copy of dc instructions and electronic scripts. Patient (s)  verbalized understanding of instructions and scripts. Patient given a current medication reconciliation form and verbalized understanding of their medications. Patient verbalized understanding of the importance of discussing medications with  his or her physician or clinic they will be following up with. Patient alert and oriented and in no acute distress. Patient offered wheelchair from treatment area to hospital entrance, patient declined wheelchair. Yes

## 2023-01-18 NOTE — ED CLERICAL - DIVISION
[facility] [de-identified] : Palpable left submandibular gland mass, 1.5cm [Midline] : trachea located in midline position [Normal] : no rashes [de-identified] : No adenopathy

## 2023-02-06 NOTE — ED ADULT NURSE NOTE - OBJECTIVE STATEMENT
home PT @ SN[facility]
missed dialysis x 1wk. hx Bipolar, schizophrenia, ESRD,. fistula left upper arm intact.+_ bruit & thrill

## 2023-02-10 NOTE — H&P ADULT - GENERAL
[FreeTextEntry1] : Feeling very stressed - wants something for short term\par \par Having some ED issues  -not smoking details…

## 2023-02-13 NOTE — DISCHARGE NOTE PROVIDER - DISCHARGE DATE
25-Sep-2019 Dutasteride Counseling: Dustasteride Counseling:  I discussed with the patient the risks of use of dutasteride including but not limited to decreased libido, decreased ejaculate volume, and gynecomastia. Women who can become pregnant should not handle medication.  All of the patient's questions and concerns were addressed. Dutasteride Male Counseling: Dustasteride Counseling:  I discussed with the patient the risks of use of dutasteride including but not limited to decreased libido, decreased ejaculate volume, and gynecomastia. Women who can become pregnant should not handle medication.  All of the patient's questions and concerns were addressed.

## 2023-02-14 NOTE — ED PROVIDER NOTE - CPE EDP MUSC NORM
[facility]  [address] [place]  [address]  [facility] [address]  [phone]      February 14, 2023      Chidi Cleveland  [address] [place] [id number]      EMERGENCY CARE PLAN  Presenting Problem Treatment Plan   Questions or concerns during clinic hours I will call the clinic directly:    [facility]  [address] , [address]  [place] [address] [phone]   Questions or concerns outside clinic hours  I will call the 24 hour line at [phone]   Patient needs to schedule an appointment  I will call the  scheduling line at [phone]   Same day treatment   I will call the clinic first, then  urgent care and/or  express care if needed   Clinic Care Coordinators Zulma Vora RN:  [phone]  [facility] Clinical Support Staff: [phone]    Crisis Services:  Behavioral or Mental Health P (Behavioral Health Providers)   [phone]   Emergency treatment--Immediately CALL [phone]         
normal...

## 2023-02-15 NOTE — H&P ADULT - PROBLEM SELECTOR PLAN 3
Patient presnted with K 5.5  EKG no t wave changes   s/p kayexlate, etta gluconate and albuterol   HD in am   monitor K level Carac Counseling:  I discussed with the patient the risks of Carac including but not limited to erythema, scaling, itching, weeping, crusting, and pain.

## 2023-02-24 ENCOUNTER — INPATIENT (INPATIENT)
Facility: HOSPITAL | Age: 43
LOS: 1 days | Discharge: HOME | DRG: 602 | End: 2023-02-26
Attending: STUDENT IN AN ORGANIZED HEALTH CARE EDUCATION/TRAINING PROGRAM | Admitting: STUDENT IN AN ORGANIZED HEALTH CARE EDUCATION/TRAINING PROGRAM
Payer: MEDICARE

## 2023-02-24 VITALS — OXYGEN SATURATION: 97 % | TEMPERATURE: 98 F | HEART RATE: 80 BPM | RESPIRATION RATE: 18 BRPM | WEIGHT: 315 LBS

## 2023-02-24 DIAGNOSIS — L03.116 CELLULITIS OF LEFT LOWER LIMB: ICD-10-CM

## 2023-02-24 DIAGNOSIS — D64.9 ANEMIA, UNSPECIFIED: ICD-10-CM

## 2023-02-24 DIAGNOSIS — N18.6 END STAGE RENAL DISEASE: ICD-10-CM

## 2023-02-24 DIAGNOSIS — E78.5 HYPERLIPIDEMIA, UNSPECIFIED: ICD-10-CM

## 2023-02-24 DIAGNOSIS — L03.90 CELLULITIS, UNSPECIFIED: ICD-10-CM

## 2023-02-24 DIAGNOSIS — Z98.890 OTHER SPECIFIED POSTPROCEDURAL STATES: Chronic | ICD-10-CM

## 2023-02-24 DIAGNOSIS — Z29.9 ENCOUNTER FOR PROPHYLACTIC MEASURES, UNSPECIFIED: ICD-10-CM

## 2023-02-24 DIAGNOSIS — I10 ESSENTIAL (PRIMARY) HYPERTENSION: ICD-10-CM

## 2023-02-24 DIAGNOSIS — F20.9 SCHIZOPHRENIA, UNSPECIFIED: ICD-10-CM

## 2023-02-24 DIAGNOSIS — J44.9 CHRONIC OBSTRUCTIVE PULMONARY DISEASE, UNSPECIFIED: ICD-10-CM

## 2023-02-24 LAB
ALBUMIN SERPL ELPH-MCNC: 3.2 G/DL — LOW (ref 3.5–5)
ALP SERPL-CCNC: 111 U/L — SIGNIFICANT CHANGE UP (ref 40–120)
ALT FLD-CCNC: 20 U/L DA — SIGNIFICANT CHANGE UP (ref 10–60)
ANION GAP SERPL CALC-SCNC: 10 MMOL/L — SIGNIFICANT CHANGE UP (ref 5–17)
APTT BLD: 33.8 SEC — SIGNIFICANT CHANGE UP (ref 27.5–35.5)
AST SERPL-CCNC: 25 U/L — SIGNIFICANT CHANGE UP (ref 10–40)
BASOPHILS # BLD AUTO: 0.02 K/UL — SIGNIFICANT CHANGE UP (ref 0–0.2)
BASOPHILS NFR BLD AUTO: 0.4 % — SIGNIFICANT CHANGE UP (ref 0–2)
BILIRUB SERPL-MCNC: 0.5 MG/DL — SIGNIFICANT CHANGE UP (ref 0.2–1.2)
BUN SERPL-MCNC: 21 MG/DL — HIGH (ref 7–18)
CALCIUM SERPL-MCNC: 9.4 MG/DL — SIGNIFICANT CHANGE UP (ref 8.4–10.5)
CHLORIDE SERPL-SCNC: 95 MMOL/L — LOW (ref 96–108)
CO2 SERPL-SCNC: 30 MMOL/L — SIGNIFICANT CHANGE UP (ref 22–31)
CREAT SERPL-MCNC: 4.28 MG/DL — HIGH (ref 0.5–1.3)
EGFR: 17 ML/MIN/1.73M2 — LOW
EOSINOPHIL # BLD AUTO: 0.29 K/UL — SIGNIFICANT CHANGE UP (ref 0–0.5)
EOSINOPHIL NFR BLD AUTO: 5.1 % — SIGNIFICANT CHANGE UP (ref 0–6)
FLUAV AG NPH QL: SIGNIFICANT CHANGE UP
FLUBV AG NPH QL: SIGNIFICANT CHANGE UP
GLUCOSE SERPL-MCNC: 136 MG/DL — HIGH (ref 70–99)
HCT VFR BLD CALC: 32.8 % — LOW (ref 39–50)
HGB BLD-MCNC: 10 G/DL — LOW (ref 13–17)
IMM GRANULOCYTES NFR BLD AUTO: 0.4 % — SIGNIFICANT CHANGE UP (ref 0–0.9)
INR BLD: 0.99 RATIO — SIGNIFICANT CHANGE UP (ref 0.88–1.16)
LACTATE SERPL-SCNC: 1.3 MMOL/L — SIGNIFICANT CHANGE UP (ref 0.7–2)
LYMPHOCYTES # BLD AUTO: 0.55 K/UL — LOW (ref 1–3.3)
LYMPHOCYTES # BLD AUTO: 9.6 % — LOW (ref 13–44)
MCHC RBC-ENTMCNC: 29.2 PG — SIGNIFICANT CHANGE UP (ref 27–34)
MCHC RBC-ENTMCNC: 30.5 GM/DL — LOW (ref 32–36)
MCV RBC AUTO: 95.9 FL — SIGNIFICANT CHANGE UP (ref 80–100)
MONOCYTES # BLD AUTO: 0.6 K/UL — SIGNIFICANT CHANGE UP (ref 0–0.9)
MONOCYTES NFR BLD AUTO: 10.5 % — SIGNIFICANT CHANGE UP (ref 2–14)
NEUTROPHILS # BLD AUTO: 4.23 K/UL — SIGNIFICANT CHANGE UP (ref 1.8–7.4)
NEUTROPHILS NFR BLD AUTO: 74 % — SIGNIFICANT CHANGE UP (ref 43–77)
NRBC # BLD: 0 /100 WBCS — SIGNIFICANT CHANGE UP (ref 0–0)
PLATELET # BLD AUTO: 259 K/UL — SIGNIFICANT CHANGE UP (ref 150–400)
POTASSIUM SERPL-MCNC: 4.3 MMOL/L — SIGNIFICANT CHANGE UP (ref 3.5–5.3)
POTASSIUM SERPL-SCNC: 4.3 MMOL/L — SIGNIFICANT CHANGE UP (ref 3.5–5.3)
PROT SERPL-MCNC: 8.5 G/DL — HIGH (ref 6–8.3)
PROTHROM AB SERPL-ACNC: 11.8 SEC — SIGNIFICANT CHANGE UP (ref 10.5–13.4)
RBC # BLD: 3.42 M/UL — LOW (ref 4.2–5.8)
RBC # FLD: 15.8 % — HIGH (ref 10.3–14.5)
SARS-COV-2 RNA SPEC QL NAA+PROBE: SIGNIFICANT CHANGE UP
SODIUM SERPL-SCNC: 135 MMOL/L — SIGNIFICANT CHANGE UP (ref 135–145)
WBC # BLD: 5.71 K/UL — SIGNIFICANT CHANGE UP (ref 3.8–10.5)
WBC # FLD AUTO: 5.71 K/UL — SIGNIFICANT CHANGE UP (ref 3.8–10.5)

## 2023-02-24 PROCEDURE — 93971 EXTREMITY STUDY: CPT | Mod: 26,RT

## 2023-02-24 PROCEDURE — 73590 X-RAY EXAM OF LOWER LEG: CPT | Mod: 26,RT

## 2023-02-24 PROCEDURE — 99222 1ST HOSP IP/OBS MODERATE 55: CPT | Mod: GC

## 2023-02-24 PROCEDURE — 93010 ELECTROCARDIOGRAM REPORT: CPT

## 2023-02-24 PROCEDURE — 99285 EMERGENCY DEPT VISIT HI MDM: CPT

## 2023-02-24 RX ORDER — ALBUTEROL 90 UG/1
2 AEROSOL, METERED ORAL EVERY 6 HOURS
Refills: 0 | Status: DISCONTINUED | OUTPATIENT
Start: 2023-02-24 | End: 2023-02-26

## 2023-02-24 RX ORDER — MIRTAZAPINE 45 MG/1
15 TABLET, ORALLY DISINTEGRATING ORAL AT BEDTIME
Refills: 0 | Status: DISCONTINUED | OUTPATIENT
Start: 2023-02-24 | End: 2023-02-26

## 2023-02-24 RX ORDER — TRAZODONE HCL 50 MG
100 TABLET ORAL DAILY
Refills: 0 | Status: DISCONTINUED | OUTPATIENT
Start: 2023-02-24 | End: 2023-02-26

## 2023-02-24 RX ORDER — AMLODIPINE BESYLATE 2.5 MG/1
5 TABLET ORAL DAILY
Refills: 0 | Status: DISCONTINUED | OUTPATIENT
Start: 2023-02-24 | End: 2023-02-26

## 2023-02-24 RX ORDER — ACETAMINOPHEN 500 MG
650 TABLET ORAL EVERY 6 HOURS
Refills: 0 | Status: DISCONTINUED | OUTPATIENT
Start: 2023-02-24 | End: 2023-02-26

## 2023-02-24 RX ORDER — VANCOMYCIN HCL 1 G
1000 VIAL (EA) INTRAVENOUS ONCE
Refills: 0 | Status: DISCONTINUED | OUTPATIENT
Start: 2023-02-24 | End: 2023-02-24

## 2023-02-24 RX ORDER — CEFEPIME 1 G/1
2000 INJECTION, POWDER, FOR SOLUTION INTRAMUSCULAR; INTRAVENOUS ONCE
Refills: 0 | Status: DISCONTINUED | OUTPATIENT
Start: 2023-02-24 | End: 2023-02-24

## 2023-02-24 RX ORDER — TRAMADOL HYDROCHLORIDE 50 MG/1
25 TABLET ORAL ONCE
Refills: 0 | Status: DISCONTINUED | OUTPATIENT
Start: 2023-02-24 | End: 2023-02-24

## 2023-02-24 RX ORDER — CEFEPIME 1 G/1
1000 INJECTION, POWDER, FOR SOLUTION INTRAMUSCULAR; INTRAVENOUS EVERY 24 HOURS
Refills: 0 | Status: DISCONTINUED | OUTPATIENT
Start: 2023-02-25 | End: 2023-02-26

## 2023-02-24 RX ORDER — HYDRALAZINE HCL 50 MG
50 TABLET ORAL DAILY
Refills: 0 | Status: DISCONTINUED | OUTPATIENT
Start: 2023-02-24 | End: 2023-02-26

## 2023-02-24 RX ORDER — HEPARIN SODIUM 5000 [USP'U]/ML
5000 INJECTION INTRAVENOUS; SUBCUTANEOUS EVERY 8 HOURS
Refills: 0 | Status: DISCONTINUED | OUTPATIENT
Start: 2023-02-24 | End: 2023-02-26

## 2023-02-24 RX ORDER — RISPERIDONE 4 MG/1
2 TABLET ORAL
Refills: 0 | Status: DISCONTINUED | OUTPATIENT
Start: 2023-02-24 | End: 2023-02-26

## 2023-02-24 RX ORDER — FUROSEMIDE 40 MG
80 TABLET ORAL DAILY
Refills: 0 | Status: DISCONTINUED | OUTPATIENT
Start: 2023-02-24 | End: 2023-02-26

## 2023-02-24 RX ORDER — CEFEPIME 1 G/1
2000 INJECTION, POWDER, FOR SOLUTION INTRAMUSCULAR; INTRAVENOUS EVERY 8 HOURS
Refills: 0 | Status: DISCONTINUED | OUTPATIENT
Start: 2023-02-24 | End: 2023-02-24

## 2023-02-24 RX ORDER — BUDESONIDE AND FORMOTEROL FUMARATE DIHYDRATE 160; 4.5 UG/1; UG/1
2 AEROSOL RESPIRATORY (INHALATION)
Refills: 0 | Status: DISCONTINUED | OUTPATIENT
Start: 2023-02-24 | End: 2023-02-26

## 2023-02-24 RX ORDER — ESCITALOPRAM OXALATE 10 MG/1
20 TABLET, FILM COATED ORAL DAILY
Refills: 0 | Status: DISCONTINUED | OUTPATIENT
Start: 2023-02-24 | End: 2023-02-26

## 2023-02-24 RX ORDER — CALCITRIOL 0.5 UG/1
0.5 CAPSULE ORAL DAILY
Refills: 0 | Status: DISCONTINUED | OUTPATIENT
Start: 2023-02-24 | End: 2023-02-25

## 2023-02-24 RX ORDER — QUETIAPINE FUMARATE 200 MG/1
300 TABLET, FILM COATED ORAL AT BEDTIME
Refills: 0 | Status: DISCONTINUED | OUTPATIENT
Start: 2023-02-24 | End: 2023-02-26

## 2023-02-24 RX ORDER — SEVELAMER CARBONATE 2400 MG/1
800 POWDER, FOR SUSPENSION ORAL
Refills: 0 | Status: DISCONTINUED | OUTPATIENT
Start: 2023-02-24 | End: 2023-02-26

## 2023-02-24 RX ORDER — CEFEPIME 1 G/1
INJECTION, POWDER, FOR SOLUTION INTRAMUSCULAR; INTRAVENOUS
Refills: 0 | Status: DISCONTINUED | OUTPATIENT
Start: 2023-02-24 | End: 2023-02-24

## 2023-02-24 RX ORDER — CEFEPIME 1 G/1
1000 INJECTION, POWDER, FOR SOLUTION INTRAMUSCULAR; INTRAVENOUS ONCE
Refills: 0 | Status: COMPLETED | OUTPATIENT
Start: 2023-02-24 | End: 2023-02-24

## 2023-02-24 RX ORDER — AMPICILLIN SODIUM AND SULBACTAM SODIUM 250; 125 MG/ML; MG/ML
INJECTION, POWDER, FOR SUSPENSION INTRAMUSCULAR; INTRAVENOUS
Refills: 0 | Status: DISCONTINUED | OUTPATIENT
Start: 2023-02-24 | End: 2023-02-24

## 2023-02-24 RX ORDER — CINACALCET 30 MG/1
30 TABLET, FILM COATED ORAL DAILY
Refills: 0 | Status: DISCONTINUED | OUTPATIENT
Start: 2023-02-24 | End: 2023-02-26

## 2023-02-24 RX ORDER — VANCOMYCIN HCL 1 G
2000 VIAL (EA) INTRAVENOUS ONCE
Refills: 0 | Status: COMPLETED | OUTPATIENT
Start: 2023-02-24 | End: 2023-02-24

## 2023-02-24 RX ORDER — NICOTINE POLACRILEX 2 MG
1 GUM BUCCAL DAILY
Refills: 0 | Status: DISCONTINUED | OUTPATIENT
Start: 2023-02-24 | End: 2023-02-26

## 2023-02-24 RX ORDER — MONTELUKAST 4 MG/1
10 TABLET, CHEWABLE ORAL DAILY
Refills: 0 | Status: DISCONTINUED | OUTPATIENT
Start: 2023-02-24 | End: 2023-02-26

## 2023-02-24 RX ORDER — ATORVASTATIN CALCIUM 80 MG/1
40 TABLET, FILM COATED ORAL AT BEDTIME
Refills: 0 | Status: DISCONTINUED | OUTPATIENT
Start: 2023-02-24 | End: 2023-02-26

## 2023-02-24 RX ORDER — HYDROXYZINE HCL 10 MG
25 TABLET ORAL
Refills: 0 | Status: DISCONTINUED | OUTPATIENT
Start: 2023-02-24 | End: 2023-02-26

## 2023-02-24 RX ORDER — TIOTROPIUM BROMIDE 18 UG/1
2 CAPSULE ORAL; RESPIRATORY (INHALATION) DAILY
Refills: 0 | Status: DISCONTINUED | OUTPATIENT
Start: 2023-02-24 | End: 2023-02-26

## 2023-02-24 RX ORDER — CLOPIDOGREL BISULFATE 75 MG/1
75 TABLET, FILM COATED ORAL DAILY
Refills: 0 | Status: DISCONTINUED | OUTPATIENT
Start: 2023-02-24 | End: 2023-02-26

## 2023-02-24 RX ORDER — AMPICILLIN SODIUM AND SULBACTAM SODIUM 250; 125 MG/ML; MG/ML
1.5 INJECTION, POWDER, FOR SUSPENSION INTRAMUSCULAR; INTRAVENOUS ONCE
Refills: 0 | Status: DISCONTINUED | OUTPATIENT
Start: 2023-02-24 | End: 2023-02-24

## 2023-02-24 RX ORDER — CARVEDILOL PHOSPHATE 80 MG/1
25 CAPSULE, EXTENDED RELEASE ORAL DAILY
Refills: 0 | Status: DISCONTINUED | OUTPATIENT
Start: 2023-02-24 | End: 2023-02-26

## 2023-02-24 RX ADMIN — MIRTAZAPINE 15 MILLIGRAM(S): 45 TABLET, ORALLY DISINTEGRATING ORAL at 22:18

## 2023-02-24 RX ADMIN — BUDESONIDE AND FORMOTEROL FUMARATE DIHYDRATE 2 PUFF(S): 160; 4.5 AEROSOL RESPIRATORY (INHALATION) at 22:17

## 2023-02-24 RX ADMIN — Medication 650 MILLIGRAM(S): at 21:25

## 2023-02-24 RX ADMIN — Medication 2000 MILLIGRAM(S): at 18:21

## 2023-02-24 RX ADMIN — Medication 650 MILLIGRAM(S): at 20:25

## 2023-02-24 RX ADMIN — HEPARIN SODIUM 5000 UNIT(S): 5000 INJECTION INTRAVENOUS; SUBCUTANEOUS at 22:18

## 2023-02-24 RX ADMIN — AMLODIPINE BESYLATE 5 MILLIGRAM(S): 2.5 TABLET ORAL at 20:25

## 2023-02-24 RX ADMIN — Medication 250 MILLIGRAM(S): at 15:27

## 2023-02-24 RX ADMIN — QUETIAPINE FUMARATE 300 MILLIGRAM(S): 200 TABLET, FILM COATED ORAL at 22:17

## 2023-02-24 RX ADMIN — ATORVASTATIN CALCIUM 40 MILLIGRAM(S): 80 TABLET, FILM COATED ORAL at 22:18

## 2023-02-24 RX ADMIN — CEFEPIME 100 MILLIGRAM(S): 1 INJECTION, POWDER, FOR SOLUTION INTRAMUSCULAR; INTRAVENOUS at 14:31

## 2023-02-24 RX ADMIN — CEFEPIME 1000 MILLIGRAM(S): 1 INJECTION, POWDER, FOR SOLUTION INTRAMUSCULAR; INTRAVENOUS at 15:01

## 2023-02-24 NOTE — H&P ADULT - NSICDXFAMILYHX_GEN_ALL_CORE_FT
FAMILY HISTORY:  Sibling  Still living? Unknown  FH: lymphoma, Age at diagnosis: Age Unknown  FH: rheumatoid arthritis, Age at diagnosis: Age Unknown

## 2023-02-24 NOTE — H&P ADULT - ATTENDING COMMENTS
Vital Signs Last 24 Hrs  T(C): 36.8 (24 Feb 2023 15:42), Max: 36.8 (24 Feb 2023 12:11)  T(F): 98.2 (24 Feb 2023 15:42), Max: 98.3 (24 Feb 2023 12:11)  HR: 62 (24 Feb 2023 15:42) (62 - 80)  BP: 162/75 (24 Feb 2023 15:42) (162/75 - 162/75)  BP(mean): --  RR: 17 (24 Feb 2023 15:42) (17 - 18)  SpO2: 97% (24 Feb 2023 15:42) (97% - 97%)    Parameters below as of 24 Feb 2023 15:42  Patient On (Oxygen Delivery Method): room air    Constitutional/General: Morbidly obese, vitals reviewed  EYE: Symmetrical pupils, conjunctiva clear   ENT: Good dentition, oropharynx clear  NECK: No visual masses, no JVD  CHEST: No respiratory distress, bilateral symmetrical chest rise  ABDOMEN: Nondistended, no visual masses  SKIN: No rash, warm, dry, RLE erythematous, warm to touch  NEURO: A+Ox3, Cranial nerves grossly intact, moves all extremities, follows commands  PSYCH: Normal mood, normal affect #RLE cellulitis   #ESRD on HD   #COPD on 3L Home O2  #Anemia of chronic disease in setting of ESRD  #HTN  #HLD  #Schizophrenia  #Bipolar Disorder  #DVT ppx    42yM with PMH of ESRD on HD (MWF), COPD on home O2, morbid obesity, ACD 2/2 ESRD, schizophrenia, bipolar disorder, who presented with RLE redness, warmth, and pain. Admitted for RLE cellulitis.     FH: RA, lymphoma  Social history:   Tobacco - currently smokes 1 pack/week, was previously smoking 1ppd x several years  Alcohol: previous abuse, now quit    Constitutional/General: Morbidly obese, vitals reviewed  EYE: Symmetrical pupils, conjunctiva clear   ENT: Good dentition, oropharynx clear  NECK: No visual masses, no JVD  CHEST: No respiratory distress, bilateral symmetrical chest rise  ABDOMEN: Nondistended, no visual masses  SKIN: No rash, warm, dry, RLE erythematous, warm to touch  NEURO: A+Ox3, Cranial nerves grossly intact, moves all extremities, follows commands  PSYCH: Normal mood, normal affect    A/P:   -WBC wnl, afebrile, hemodynamically stable; RLE mildly erythematous  -s/p cefepime and vancomycin in the ED; will continue cefepime for now as IV Clindamycin is on shortage. If BCx are negative, patient can be de-escalated to PO clindamycin  -Pain control PRN  -Continue HD on MWF, Dr. La consulted   -Anemia of chronic disease in setting of ESRD, Hgb stable  -On 3L home O2, titrate oxygen to keep saturation 88-92%  -Continue home meds for chronic conditions  -F/U BCx  -DVT ppx: Heparin

## 2023-02-24 NOTE — ED ADULT NURSE NOTE - NSIMPLEMENTINTERV_GEN_ALL_ED
Implemented All Universal Safety Interventions:  [place] to call system. Call bell, personal items and telephone within reach. Instruct patient to call for assistance. Room bathroom lighting operational. Non-slip footwear when patient is off stretcher. Physically safe environment: no spills, clutter or unnecessary equipment. Stretcher in lowest position, wheels locked, appropriate side rails in place.

## 2023-02-24 NOTE — H&P ADULT - ASSESSMENT
A 42 year old male, from home, ambulating independently, w/ PMHx of  Obesity, HTN, Schizophrenia, COPD on 3L NC, ESRD on HD M/W/F, presents to ED complaining of  RLE pain that started 3 days ago. Admitted to medicine for cellulitis management.

## 2023-02-24 NOTE — PATIENT PROFILE ADULT - FALL HARM RISK - HARM RISK INTERVENTIONS

## 2023-02-24 NOTE — ED PROVIDER NOTE - PHYSICAL EXAMINATION
Gen: NAD, AOx3, able to make needs known,   Head: NCAT  HEENT: EOMI, oral mucosa moist, normal conjunctiva  Lung: CTAB, no respiratory distress, no wheezes/rhonchi/rales B/L, speaking in full sentences  CV: RRR, no murmurs, shiley noted to rt chest wall, DP pulses strong and equal  Abd: obese, non distended, soft, nontender, no guarding, no CVA tenderness  MSK: +RLE swelling with overlying redness, tight compartment, no lacerations or ulcerations  Neuro: Appears non focal  Skin: Warm, well perfused, no rash  Psych: normal affect

## 2023-02-24 NOTE — ED PROVIDER NOTE - OBJECTIVE STATEMENT
43 y/o M PMH obesity, HTN, schizophrenia, COPD on 3L NC, ESRD on HD M/W/F presenting to ED for RLE pain and swelling x 3 days. Had full session of dialysis today. No trauma, or bites noted to RLE. Attempted to elevate legs to reduce swelling without success. Thought dialysis would help get excess fluid off but did not. Dialysis RN advised pt to come to ED for eval.  No fevers or chills. Denies CP, SOB, abd pain, n/v/d.

## 2023-02-24 NOTE — H&P ADULT - NSICDXPASTMEDICALHX_GEN_ALL_CORE_FT
PAST MEDICAL HISTORY:  COPD (chronic obstructive pulmonary disease)     ESRD on dialysis     HLD (hyperlipidemia)     HTN (hypertension)     Schizophrenia

## 2023-02-24 NOTE — H&P ADULT - NSHPREVIEWOFSYSTEMS_GEN_ALL_CORE
ROS  Constitutional: (-)fever, (-)night sweats, (-)chills, (-)cold intolerance, (-)fatigue  Eyes: (-)change in vision, (-)loss of vision, (-)blurred vision  Ears: (-)difficulty hearing, (-)hearing loss  Nose: (-)nasal discharge  Mouth/Throat/Voice: (-)lip sores, (-)dysphagia, (-)odynophagia  Neck: (-)neck pain, (-)neck stiffness, (-)neck lumps, (-)neck swelling  Respiratory: (-)dyspnea, (-)cough, (-)cough productive of sputum, (-)hemoptysis, (-)wheezing  Cardiovascular: (-)chest pain, (-)palpitations, (-)dyspnea at rest, (-)dyspnea with activity, (-)orthopnea  Gastrointestinal: (-)abdominal pain, (-)rectal pain, (-)nausea, (-)vomiting  Urinary: (-)dysuria, (-)hematuria, (-)urinary hesitancy, (-)difficulty initiating urine stream  Genital/Reproductive: (-)change in libido, (-)problems with sexual function  Dermatologic/Integumentary: (+)Hot sensation, (-)change in hair texture, (+)change in skin texture, (-)change in nail appearance, (-)dry hair  Musculoskeletal: (+)Leg pain, (+)muscle pain, (-)back pain, (-)tender points, (-)muscle cramps  Neurological: (-)headaches, (-)vertigo, (-)lightheadedness, (-)fainting  Psychiatric: (-)change in mood, (-)depression, (-)sadness interfering with function, (-)anxiety, (-)nervousness  Hematologic/Lymphatic: (-)easy bruising, (-)difficulty stopping blood flow

## 2023-02-24 NOTE — H&P ADULT - HISTORY OF PRESENT ILLNESS
A 42 year old male, from home, ambulating independently, w/ PMHx of  Obesity, HTN, Schizophrenia, COPD on 3L NC, ESRD on HD M/W/F, presents to ED complaining of  RLE pain that started 3 days ago. Pain does not radiate, is dull in quality, 3/10 in intensity (8/10 w/ palpation as per patient), and associated w/ swelling and hot sensation. Patient had HD on 2/22/23 and 2/24/23, but symptoms persisted. This has never happened before as per patient. Denies trauma, or insects bites Elevation of leg has not decreased the swelling. Denies recent traveling. Denies fever, chills, night sweats, nausea, vomiting, dizziness, diarrhea, chest pain, dyspnea, or palpitations.

## 2023-02-24 NOTE — ED PROVIDER NOTE - NS ED ROS FT
GENERAL: No fever or chills  EYES: No change in vision  HEENT: No trouble swallowing or speaking  CARDIAC: No chest pain,  PULMONARY: No cough or SOB  GI: No abdominal pain, no nausea or no vomiting, no diarrhea or constipation  : No changes in urination  SKIN: No rashes  NEURO: No headache, no numbness  MSK: + RLE swelling, pain   Otherwise as HPI or negative.

## 2023-02-24 NOTE — H&P ADULT - NSHPPHYSICALEXAM_GEN_ALL_CORE
PHYSICAL EXAMINATION:  GENERAL: Morbid obese male, NAD, on 3L NC  HEAD:  Atraumatic, Normocephalic  EYES:  Conjunctiva and sclera clear, pupils are equal, round, and reactive to light and accommodation.  NECK: Supple, No JVD, trachea is midline, no evidence of thyroid enlargement, no lymphadenopathy or tenderness.  CHEST/LUNG: Clear to auscultation; No rales, rhonchi, wheezing, or rubs  HEART: Regular rate and rhythm; No murmurs, rubs, or gallops  ABDOMEN: Soft, Nontender, Nondistended; Bowel sounds present  NERVOUS SYSTEM:  Alert & Oriented X3; No focal sensory or motor deficits are noted; Recent and remote memory is intact; Appropriate mood and affect.  EXTREMITIES:  2+ Peripheral Pulses, No clubbing, cyanosis, or edema, right lower extremity swelling, erythema and hot/tenderness/tense to touch.  SKIN: Warm, dry, and well perfused; Good turgor; No lesions, nodules or rashes are noted.

## 2023-02-24 NOTE — H&P ADULT - PROBLEM SELECTOR PLAN 1
X ray right tibia/fibula showed no fracture, and moderate generalized swelling.   US lower ext negative for DVT  Lactate 1.3 and WBC 5.71. Patient received Cefepime 1g and Vancomycin 2g in the ED  Patient allergic to penicillin. Pharmacy has a shortage of IV Clindamycin.   Will do Cefepime 1g q24 for now  F/U Blood cultures. If positive, consider Vancomycin on HD days.

## 2023-02-24 NOTE — ED PROVIDER NOTE - CLINICAL SUMMARY MEDICAL DECISION MAKING FREE TEXT BOX
41 y/o M PMH obesity, HTN, COPD on 3L NC, ESRD on HD M/W/F presenting to ED for RLE pain and swelling x 3 days. Had full session of dialysis today. No trauma, or bites noted to RLE. Attempted to elevate legs to reduce swelling without success. Thought dialysis would help get excess fluid off but did not. No fevers or chills. Denies CP, SOB, abd pain, n/v/d. PE notable for RLE swelling, redness, no ulcerations, DP pulses strong and equal. Doppler of RLE negative for DVT. No gas appreciated on tib/fib xray. Most concerning for cellulitis, given empiric abx, plan for admission.

## 2023-02-24 NOTE — ED ADULT NURSE NOTE - OBJECTIVE STATEMENT
Covering nurse note: Pt presents to the ED with c/o pain, redness, and swelling on right leg x 3 days. Pt completed dialysis today. Denies fever or chills. Covering nurse note: Pt presents to the ED with c/o pain, redness, and swelling on right leg x 3 days. Pt completed dialysis today. Denies fever or chills. As per pt, new AV fistula placed on right wrist on Tuesday.

## 2023-02-24 NOTE — H&P ADULT - PROBLEM SELECTOR PLAN 4
No signs of bleeding   Normocytic anemia   Likely anemia of chronic disease  Will keep monitoring for now

## 2023-02-24 NOTE — H&P ADULT - PROBLEM SELECTOR PLAN 6
Will continue home meds  Haldol, Trazodone, Hydroxyzine, Mirtazapine, Risperidone, Escitalopram, and Quetiapine

## 2023-02-25 LAB
A1C WITH ESTIMATED AVERAGE GLUCOSE RESULT: 4.9 % — SIGNIFICANT CHANGE UP (ref 4–5.6)
ALBUMIN SERPL ELPH-MCNC: 3.1 G/DL — LOW (ref 3.5–5)
ALP SERPL-CCNC: 110 U/L — SIGNIFICANT CHANGE UP (ref 40–120)
ALT FLD-CCNC: 17 U/L DA — SIGNIFICANT CHANGE UP (ref 10–60)
ANION GAP SERPL CALC-SCNC: 11 MMOL/L — SIGNIFICANT CHANGE UP (ref 5–17)
AST SERPL-CCNC: 13 U/L — SIGNIFICANT CHANGE UP (ref 10–40)
BASOPHILS # BLD AUTO: 0.03 K/UL — SIGNIFICANT CHANGE UP (ref 0–0.2)
BASOPHILS NFR BLD AUTO: 0.6 % — SIGNIFICANT CHANGE UP (ref 0–2)
BILIRUB SERPL-MCNC: 0.5 MG/DL — SIGNIFICANT CHANGE UP (ref 0.2–1.2)
BUN SERPL-MCNC: 34 MG/DL — HIGH (ref 7–18)
CALCIUM SERPL-MCNC: 9.2 MG/DL — SIGNIFICANT CHANGE UP (ref 8.4–10.5)
CHLORIDE SERPL-SCNC: 94 MMOL/L — LOW (ref 96–108)
CO2 SERPL-SCNC: 28 MMOL/L — SIGNIFICANT CHANGE UP (ref 22–31)
CREAT SERPL-MCNC: 6.04 MG/DL — HIGH (ref 0.5–1.3)
EGFR: 11 ML/MIN/1.73M2 — LOW
EOSINOPHIL # BLD AUTO: 0.31 K/UL — SIGNIFICANT CHANGE UP (ref 0–0.5)
EOSINOPHIL NFR BLD AUTO: 6.1 % — HIGH (ref 0–6)
ESTIMATED AVERAGE GLUCOSE: 94 MG/DL — SIGNIFICANT CHANGE UP (ref 68–114)
GLUCOSE SERPL-MCNC: 130 MG/DL — HIGH (ref 70–99)
HCT VFR BLD CALC: 33.2 % — LOW (ref 39–50)
HGB BLD-MCNC: 9.9 G/DL — LOW (ref 13–17)
IMM GRANULOCYTES NFR BLD AUTO: 0.4 % — SIGNIFICANT CHANGE UP (ref 0–0.9)
LYMPHOCYTES # BLD AUTO: 0.77 K/UL — LOW (ref 1–3.3)
LYMPHOCYTES # BLD AUTO: 15.2 % — SIGNIFICANT CHANGE UP (ref 13–44)
MAGNESIUM SERPL-MCNC: 2.3 MG/DL — SIGNIFICANT CHANGE UP (ref 1.6–2.6)
MCHC RBC-ENTMCNC: 29.3 PG — SIGNIFICANT CHANGE UP (ref 27–34)
MCHC RBC-ENTMCNC: 29.8 GM/DL — LOW (ref 32–36)
MCV RBC AUTO: 98.2 FL — SIGNIFICANT CHANGE UP (ref 80–100)
MONOCYTES # BLD AUTO: 0.64 K/UL — SIGNIFICANT CHANGE UP (ref 0–0.9)
MONOCYTES NFR BLD AUTO: 12.6 % — SIGNIFICANT CHANGE UP (ref 2–14)
NEUTROPHILS # BLD AUTO: 3.31 K/UL — SIGNIFICANT CHANGE UP (ref 1.8–7.4)
NEUTROPHILS NFR BLD AUTO: 65.1 % — SIGNIFICANT CHANGE UP (ref 43–77)
NRBC # BLD: 0 /100 WBCS — SIGNIFICANT CHANGE UP (ref 0–0)
PHOSPHATE SERPL-MCNC: 8.2 MG/DL — HIGH (ref 2.5–4.5)
PLATELET # BLD AUTO: 205 K/UL — SIGNIFICANT CHANGE UP (ref 150–400)
POTASSIUM SERPL-MCNC: 4.9 MMOL/L — SIGNIFICANT CHANGE UP (ref 3.5–5.3)
POTASSIUM SERPL-SCNC: 4.9 MMOL/L — SIGNIFICANT CHANGE UP (ref 3.5–5.3)
PROT SERPL-MCNC: 8.2 G/DL — SIGNIFICANT CHANGE UP (ref 6–8.3)
RBC # BLD: 3.38 M/UL — LOW (ref 4.2–5.8)
RBC # FLD: 15.6 % — HIGH (ref 10.3–14.5)
SODIUM SERPL-SCNC: 133 MMOL/L — LOW (ref 135–145)
WBC # BLD: 5.08 K/UL — SIGNIFICANT CHANGE UP (ref 3.8–10.5)
WBC # FLD AUTO: 5.08 K/UL — SIGNIFICANT CHANGE UP (ref 3.8–10.5)

## 2023-02-25 PROCEDURE — 99233 SBSQ HOSP IP/OBS HIGH 50: CPT | Mod: GC

## 2023-02-25 RX ORDER — CALCITRIOL 0.5 UG/1
0.5 CAPSULE ORAL DAILY
Refills: 0 | Status: DISCONTINUED | OUTPATIENT
Start: 2023-02-25 | End: 2023-02-26

## 2023-02-25 RX ADMIN — Medication 100 MILLIGRAM(S): at 12:15

## 2023-02-25 RX ADMIN — CLOPIDOGREL BISULFATE 75 MILLIGRAM(S): 75 TABLET, FILM COATED ORAL at 12:16

## 2023-02-25 RX ADMIN — HEPARIN SODIUM 5000 UNIT(S): 5000 INJECTION INTRAVENOUS; SUBCUTANEOUS at 13:32

## 2023-02-25 RX ADMIN — CALCITRIOL 0.5 MICROGRAM(S): 0.5 CAPSULE ORAL at 13:32

## 2023-02-25 RX ADMIN — ESCITALOPRAM OXALATE 20 MILLIGRAM(S): 10 TABLET, FILM COATED ORAL at 12:16

## 2023-02-25 RX ADMIN — CARVEDILOL PHOSPHATE 25 MILLIGRAM(S): 80 CAPSULE, EXTENDED RELEASE ORAL at 06:41

## 2023-02-25 RX ADMIN — CINACALCET 30 MILLIGRAM(S): 30 TABLET, FILM COATED ORAL at 13:32

## 2023-02-25 RX ADMIN — Medication 80 MILLIGRAM(S): at 06:41

## 2023-02-25 RX ADMIN — HEPARIN SODIUM 5000 UNIT(S): 5000 INJECTION INTRAVENOUS; SUBCUTANEOUS at 22:16

## 2023-02-25 RX ADMIN — HEPARIN SODIUM 5000 UNIT(S): 5000 INJECTION INTRAVENOUS; SUBCUTANEOUS at 06:41

## 2023-02-25 RX ADMIN — MONTELUKAST 10 MILLIGRAM(S): 4 TABLET, CHEWABLE ORAL at 12:16

## 2023-02-25 RX ADMIN — BUDESONIDE AND FORMOTEROL FUMARATE DIHYDRATE 2 PUFF(S): 160; 4.5 AEROSOL RESPIRATORY (INHALATION) at 10:20

## 2023-02-25 RX ADMIN — CEFEPIME 100 MILLIGRAM(S): 1 INJECTION, POWDER, FOR SOLUTION INTRAMUSCULAR; INTRAVENOUS at 10:20

## 2023-02-25 RX ADMIN — Medication 25 MILLIGRAM(S): at 06:41

## 2023-02-25 RX ADMIN — SEVELAMER CARBONATE 800 MILLIGRAM(S): 2400 POWDER, FOR SUSPENSION ORAL at 12:15

## 2023-02-25 RX ADMIN — Medication 50 MILLIGRAM(S): at 06:40

## 2023-02-25 RX ADMIN — SEVELAMER CARBONATE 800 MILLIGRAM(S): 2400 POWDER, FOR SUSPENSION ORAL at 17:30

## 2023-02-25 RX ADMIN — MIRTAZAPINE 15 MILLIGRAM(S): 45 TABLET, ORALLY DISINTEGRATING ORAL at 22:17

## 2023-02-25 RX ADMIN — Medication 25 MILLIGRAM(S): at 17:30

## 2023-02-25 RX ADMIN — BUDESONIDE AND FORMOTEROL FUMARATE DIHYDRATE 2 PUFF(S): 160; 4.5 AEROSOL RESPIRATORY (INHALATION) at 22:16

## 2023-02-25 RX ADMIN — RISPERIDONE 2 MILLIGRAM(S): 4 TABLET ORAL at 17:29

## 2023-02-25 RX ADMIN — RISPERIDONE 2 MILLIGRAM(S): 4 TABLET ORAL at 06:40

## 2023-02-25 RX ADMIN — ATORVASTATIN CALCIUM 40 MILLIGRAM(S): 80 TABLET, FILM COATED ORAL at 22:16

## 2023-02-25 RX ADMIN — AMLODIPINE BESYLATE 5 MILLIGRAM(S): 2.5 TABLET ORAL at 06:41

## 2023-02-25 RX ADMIN — QUETIAPINE FUMARATE 300 MILLIGRAM(S): 200 TABLET, FILM COATED ORAL at 22:17

## 2023-02-25 RX ADMIN — TIOTROPIUM BROMIDE 2 PUFF(S): 18 CAPSULE ORAL; RESPIRATORY (INHALATION) at 12:16

## 2023-02-25 NOTE — PROGRESS NOTE ADULT - PROBLEM SELECTOR PLAN 3
Continue 3L NC  Sat target 88-92%   Will continue Albuterol, Spiriva, and Symbicort - Continue 3L NC  - Sat target 88-92%   - Will continue Albuterol, Spiriva, and Symbicort

## 2023-02-25 NOTE — CONSULT NOTE ADULT - SUBJECTIVE AND OBJECTIVE BOX
[facility] : Progress Note :: [phone], (office [phone]),   Dr La / Dr Hui / Dr Barber / Dr Villalobos / Dr Hang CASTELLANO / Dr Mehta / Dr Sanchez / Dr Alber dumont  _____________________________________________________________________________________________  Patient is a 42y Male whom presented to the hospital with RT leg swelling and warth for a few days. He has ESRD on HD MWF at [facility].  Admitted with cellulitis of RLE. renal consulted for ESRD.  Denies any SOB, fever or chills. Had full HD treatment yesterday    PAST MEDICAL & SURGICAL HISTORY:  Schizophrenia      COPD (chronic obstructive pulmonary disease)      HTN (hypertension)      ESRD on dialysis      HLD (hyperlipidemia)        penicillin (Anaphylaxis)  Seafood (Hives)  shellfish (Anaphylaxis)    Home Medications Reviewed  Hospital Medications:   MEDICATIONS  (STANDING):  amLODIPine   Tablet 5 milliGRAM(s) Oral daily  atorvastatin 40 milliGRAM(s) Oral at bedtime  budesonide 160 MICROgram(s)/formoterol 4.5 MICROgram(s) Inhaler 2 Puff(s) Inhalation two times a day  calcitriol   Capsule 0.5 MICROGram(s) Oral daily  carvedilol 25 milliGRAM(s) Oral daily  cefepime   IVPB 1000 milliGRAM(s) IV Intermittent every 24 hours  cinacalcet 30 milliGRAM(s) Oral daily  clopidogrel Tablet 75 milliGRAM(s) Oral daily  escitalopram 20 milliGRAM(s) Oral daily  furosemide    Tablet 80 milliGRAM(s) Oral daily  heparin   Injectable 5000 Unit(s) SubCutaneous every 8 hours  hydrALAZINE 50 milliGRAM(s) Oral daily  hydrOXYzine hydrochloride 25 milliGRAM(s) Oral two times a day  mirtazapine 15 milliGRAM(s) Oral at bedtime  montelukast 10 milliGRAM(s) Oral daily  nicotine -   7 mG/24Hr(s) Patch 1 Patch Transdermal daily  QUEtiapine 300 milliGRAM(s) Oral at bedtime  risperiDONE   Tablet 2 milliGRAM(s) Oral two times a day  sevelamer carbonate 800 milliGRAM(s) Oral three times a day with meals  tiotropium 2.5 MICROgram(s) Inhaler 2 Puff(s) Inhalation daily  traZODone 100 milliGRAM(s) Oral daily    SOCIAL HISTORY:  Denies ETOh,Smoking,   FAMILY HISTORY:  FH: lymphoma (Sibling)    FH: rheumatoid arthritis (Sibling)        VITALS:  T(F): 98.7 (02-25-23 @ 13:02), Max: 98.8 (02-24-23 @ 21:10)  HR: 76 (02-25-23 @ 13:02)  BP: 166/79 (02-25-23 @ 13:02)  RR: 18 (02-25-23 @ 13:02)  SpO2: 100% (02-25-23 @ 13:02)  Wt(kg): --      PHYSICAL EXAM:  Constitutional: NAD  HEENT: anicteric sclera, oropharynx clear.  Neck: No JVD  Respiratory: CTAB, no wheezes, rales or rhonchi  Cardiovascular: S1, S2, RRR  Gastrointestinal: BS+, soft, NT/ND  Extremities:  No peripheral edema  Neurological: A/O x 3, no focal deficits  : No CVA tenderness. No hernandez.   Skin: No rashes  Vascular Access: IJ permacath. RT wrist AVF dressed    LABS:  02-25    133<L>  |  94<L>  |  34<H>  ----------------------------<  130<H>  4.9   |  28  |  6.04<H>    Ca    9.2      25 Feb 2023 05:40  Phos  8.2     02-25  Mg     2.3     02-25    TPro  8.2  /  Alb  3.1<L>  /  TBili  0.5  /  DBili      /  AST  13  /  ALT  17  /  AlkPhos  110  02-25    Creatinine Trend: 6.04 <--, 4.28 <--                        9.9    5.08  )-----------( 205      ( 25 Feb 2023 05:40 )             33.2     Urine Studies:      RADIOLOGY & ADDITIONAL STUDIES:

## 2023-02-25 NOTE — PROGRESS NOTE ADULT - PROBLEM SELECTOR PLAN 5
Will continue Carvedilol, and Hydralazine   Monitor BP  Adjust medication as needed to maintain a BP goal of <140/90 mmHg. - Will continue Carvedilol, and Hydralazine   - Monitor BP  - Adjust medication as needed to maintain a BP goal of <140/90 mmHg.

## 2023-02-25 NOTE — PROGRESS NOTE ADULT - PROBLEM SELECTOR PLAN 6
Will continue home meds  Haldol, Trazodone, Hydroxyzine, Mirtazapine, Risperidone, Escitalopram, and Quetiapine - Will continue home meds  - Haldol, Trazodone, Hydroxyzine, Mirtazapine, Risperidone, Escitalopram, and Quetiapine

## 2023-02-25 NOTE — PROGRESS NOTE ADULT - PROBLEM SELECTOR PLAN 4
No signs of bleeding   Normocytic anemia   Likely anemia of chronic disease  Will keep monitoring for now - No signs of bleeding   - Normocytic anemia   - Likely anemia of chronic disease  - Will keep monitoring for now

## 2023-02-25 NOTE — PROGRESS NOTE ADULT - PROBLEM SELECTOR PLAN 2
Nephro Dr La informed about the patient   Patient on dialysis M/W/F  F/U Nephro   Renal diet - Nephro Dr La informed about the patient   - Patient on dialysis M/W/F  - F/U Nephro Dr. Njeru   - Renal diet

## 2023-02-25 NOTE — PROGRESS NOTE ADULT - SUBJECTIVE AND OBJECTIVE BOX
PGY-1 Progress Note discussed with attending      PLEASE CONTACT ON CALL TEAM:  - On Call Team (Please refer to Janine) FROM 5:00 PM - 8:30PM  - Nightfloat Team FROM 8:30 -7:30 AM    INTERVAL HPI/OVERNIGHT EVENTS:       REVIEW OF SYSTEMS:  CONSTITUTIONAL: No fever, weight loss, or fatigue  RESPIRATORY: No cough, wheezing, chills or hemoptysis; No shortness of breath  CARDIOVASCULAR: No chest pain, palpitations, dizziness, or leg swelling  GASTROINTESTINAL: No abdominal pain. No nausea, vomiting, or hematemesis; No diarrhea or constipation. No melena or hematochezia.  GENITOURINARY: No dysuria or hematuria, urinary frequency  NEUROLOGICAL: No headaches, memory loss, loss of strength, numbness, or tremors  SKIN: No itching, burning, rashes, or lesions     MEDICATIONS  (STANDING):  amLODIPine   Tablet 5 milliGRAM(s) Oral daily  atorvastatin 40 milliGRAM(s) Oral at bedtime  budesonide 160 MICROgram(s)/formoterol 4.5 MICROgram(s) Inhaler 2 Puff(s) Inhalation two times a day  calcitriol   Capsule 0.5 MICROGram(s) Oral daily  carvedilol 25 milliGRAM(s) Oral daily  cefepime   IVPB 1000 milliGRAM(s) IV Intermittent every 24 hours  cinacalcet 30 milliGRAM(s) Oral daily  clopidogrel Tablet 75 milliGRAM(s) Oral daily  escitalopram 20 milliGRAM(s) Oral daily  furosemide    Tablet 80 milliGRAM(s) Oral daily  heparin   Injectable 5000 Unit(s) SubCutaneous every 8 hours  hydrALAZINE 50 milliGRAM(s) Oral daily  hydrOXYzine hydrochloride 25 milliGRAM(s) Oral two times a day  mirtazapine 15 milliGRAM(s) Oral at bedtime  montelukast 10 milliGRAM(s) Oral daily  nicotine -   7 mG/24Hr(s) Patch 1 Patch Transdermal daily  QUEtiapine 300 milliGRAM(s) Oral at bedtime  risperiDONE   Tablet 2 milliGRAM(s) Oral two times a day  sevelamer carbonate 800 milliGRAM(s) Oral three times a day with meals  tiotropium 2.5 MICROgram(s) Inhaler 2 Puff(s) Inhalation daily  traZODone 100 milliGRAM(s) Oral daily    MEDICATIONS  (PRN):  acetaminophen     Tablet .. 650 milliGRAM(s) Oral every 6 hours PRN Temp greater or equal to 38C (100.4F), Mild Pain (1 - 3)  albuterol    90 MICROgram(s) HFA Inhaler 2 Puff(s) Inhalation every 6 hours PRN Shortness of Breath and/or Wheezing      Vital Signs Last 24 Hrs  T(C): 36.9 (25 Feb 2023 05:52), Max: 37.1 (24 Feb 2023 21:10)  T(F): 98.4 (25 Feb 2023 05:52), Max: 98.8 (24 Feb 2023 21:10)  HR: 76 (25 Feb 2023 05:52) (62 - 80)  BP: 179/82 (25 Feb 2023 05:52) (162/75 - 184/90)  BP(mean): --  RR: 18 (25 Feb 2023 05:52) (17 - 18)  SpO2: 99% (25 Feb 2023 05:52) (97% - 100%)    Parameters below as of 25 Feb 2023 05:52  Patient On (Oxygen Delivery Method): room air        PHYSICAL EXAMINATION:  GENERAL: NAD, well built  HEAD:  Atraumatic, Normocephalic  EYES:  conjunctiva and sclera clear  NECK: Supple, No JVD, Normal thyroid  CHEST/LUNG: Clear to auscultation. Clear to percussion bilaterally; No rales, rhonchi, wheezing, or rubs  HEART: Regular rate and rhythm; No murmurs, rubs, or gallops  ABDOMEN: Soft, Nontender, Nondistended; Bowel sounds present, no pain or masses on palpation  NERVOUS SYSTEM:  Alert & Oriented X3  : voiding well  EXTREMITIES:  2+ Peripheral Pulses, No clubbing, cyanosis, or edema  SKIN: warm dry                          9.9    5.08  )-----------( 205      ( 25 Feb 2023 05:40 )             33.2     02-25    133<L>  |  94<L>  |  34<H>  ----------------------------<  130<H>  4.9   |  28  |  6.04<H>    Ca    9.2      25 Feb 2023 05:40  Phos  8.2     02-25  Mg     2.3     02-25    TPro  8.2  /  Alb  3.1<L>  /  TBili  0.5  /  DBili  x   /  AST  13  /  ALT  17  /  AlkPhos  110  02-25    LIVER FUNCTIONS - ( 25 Feb 2023 05:40 )  Alb: 3.1 g/dL / Pro: 8.2 g/dL / ALK PHOS: 110 U/L / ALT: 17 U/L DA / AST: 13 U/L / GGT: x               PT/INR - ( 24 Feb 2023 12:30 )   PT: 11.8 sec;   INR: 0.99 ratio         PTT - ( 24 Feb 2023 12:30 )  PTT:33.8 sec    I&O's Summary          CAPILLARY BLOOD GLUCOSE      RADIOLOGY & ADDITIONAL TESTS:                   PGY-1 Progress Note discussed with attending      PLEASE CONTACT ON CALL TEAM:  - On Call Team (Please refer to Janine) FROM 5:00 PM - 8:30PM  - Nightfloat Team FROM 8:30 -7:30 AM    INTERVAL HPI/OVERNIGHT EVENTS: No acute events overnight.  Patient examined at bedside this AM.  Patient not wearing NC during exam.  Patient continues to complain of RLE posterior calf pain.      REVIEW OF SYSTEMS:  CONSTITUTIONAL: No fever, weight loss, or fatigue; +RLE PAIN  RESPIRATORY: No cough, wheezing, chills or hemoptysis; No shortness of breath  CARDIOVASCULAR: No chest pain, palpitations, dizziness, or leg swelling  GASTROINTESTINAL: No abdominal pain. No nausea, vomiting, or hematemesis; No diarrhea or constipation. No melena or hematochezia.  GENITOURINARY: No dysuria or hematuria, urinary frequency  NEUROLOGICAL: No headaches, memory loss, loss of strength, numbness, or tremors  SKIN: +SKIN CHANGES ON POSTERIOR RLE CALF    MEDICATIONS  (STANDING):  amLODIPine   Tablet 5 milliGRAM(s) Oral daily  atorvastatin 40 milliGRAM(s) Oral at bedtime  budesonide 160 MICROgram(s)/formoterol 4.5 MICROgram(s) Inhaler 2 Puff(s) Inhalation two times a day  calcitriol   Capsule 0.5 MICROGram(s) Oral daily  carvedilol 25 milliGRAM(s) Oral daily  cefepime   IVPB 1000 milliGRAM(s) IV Intermittent every 24 hours  cinacalcet 30 milliGRAM(s) Oral daily  clopidogrel Tablet 75 milliGRAM(s) Oral daily  escitalopram 20 milliGRAM(s) Oral daily  furosemide    Tablet 80 milliGRAM(s) Oral daily  heparin   Injectable 5000 Unit(s) SubCutaneous every 8 hours  hydrALAZINE 50 milliGRAM(s) Oral daily  hydrOXYzine hydrochloride 25 milliGRAM(s) Oral two times a day  mirtazapine 15 milliGRAM(s) Oral at bedtime  montelukast 10 milliGRAM(s) Oral daily  nicotine -   7 mG/24Hr(s) Patch 1 Patch Transdermal daily  QUEtiapine 300 milliGRAM(s) Oral at bedtime  risperiDONE   Tablet 2 milliGRAM(s) Oral two times a day  sevelamer carbonate 800 milliGRAM(s) Oral three times a day with meals  tiotropium 2.5 MICROgram(s) Inhaler 2 Puff(s) Inhalation daily  traZODone 100 milliGRAM(s) Oral daily    MEDICATIONS  (PRN):  acetaminophen     Tablet .. 650 milliGRAM(s) Oral every 6 hours PRN Temp greater or equal to 38C (100.4F), Mild Pain (1 - 3)  albuterol    90 MICROgram(s) HFA Inhaler 2 Puff(s) Inhalation every 6 hours PRN Shortness of Breath and/or Wheezing      Vital Signs Last 24 Hrs  T(C): 36.9 (25 Feb 2023 05:52), Max: 37.1 (24 Feb 2023 21:10)  T(F): 98.4 (25 Feb 2023 05:52), Max: 98.8 (24 Feb 2023 21:10)  HR: 76 (25 Feb 2023 05:52) (62 - 80)  BP: 179/82 (25 Feb 2023 05:52) (162/75 - 184/90)  BP(mean): --  RR: 18 (25 Feb 2023 05:52) (17 - 18)  SpO2: 99% (25 Feb 2023 05:52) (97% - 100%)    Parameters below as of 25 Feb 2023 05:52  Patient On (Oxygen Delivery Method): room air        PHYSICAL EXAMINATION:  GENERAL: Morbid obese male, NAD, Not wearing NC during exam  HEAD:  Atraumatic, Normocephalic  EYES:  Conjunctiva and sclera clear, pupils are equal, round, and reactive to light and accommodation.  NECK: Supple, No JVD, trachea is midline, no evidence of thyroid enlargement, no lymphadenopathy or tenderness.  CHEST/LUNG: Clear to auscultation; No rales, rhonchi, wheezing, or rubs  HEART: Regular rate and rhythm; No murmurs, rubs, or gallops  ABDOMEN: Soft, Nontender, Nondistended; Bowel sounds present  NERVOUS SYSTEM:  Alert & Oriented X3; No focal sensory or motor deficits are noted; Recent and remote memory is intact; Appropriate mood and affect.  EXTREMITIES:  2+ Peripheral Pulses, No clubbing, cyanosis, or edema; right lower extremity swelling, erythema and warm/tenderness/tense to touch.  SKIN: Warm, dry, and well perfused; Good turgor; No lesions, nodules or rashes are noted.                          9.9    5.08  )-----------( 205      ( 25 Feb 2023 05:40 )             33.2     02-25    133<L>  |  94<L>  |  34<H>  ----------------------------<  130<H>  4.9   |  28  |  6.04<H>    Ca    9.2      25 Feb 2023 05:40  Phos  8.2     02-25  Mg     2.3     02-25    TPro  8.2  /  Alb  3.1<L>  /  TBili  0.5  /  DBili  x   /  AST  13  /  ALT  17  /  AlkPhos  110  02-25    LIVER FUNCTIONS - ( 25 Feb 2023 05:40 )  Alb: 3.1 g/dL / Pro: 8.2 g/dL / ALK PHOS: 110 U/L / ALT: 17 U/L DA / AST: 13 U/L / GGT: x               PT/INR - ( 24 Feb 2023 12:30 )   PT: 11.8 sec;   INR: 0.99 ratio         PTT - ( 24 Feb 2023 12:30 )  PTT:33.8 sec    I&O's Summary          CAPILLARY BLOOD GLUCOSE      RADIOLOGY & ADDITIONAL TESTS:                   PGY-1 Progress Note discussed with attending      PLEASE CONTACT ON CALL TEAM:  - On Call Team (Please refer to Janine) FROM 5:00 PM - 8:30PM  - Nightfloat Team FROM 8:30 -7:30 AM    INTERVAL HPI/OVERNIGHT EVENTS: No acute events overnight.  Patient examined at bedside this AM.  Patient not wearing NC during exam.  Patient continues to complain of RLE posterior calf pain.      REVIEW OF SYSTEMS:  CONSTITUTIONAL: No fever, weight loss, or fatigue; +RLE PAIN  RESPIRATORY: No cough, wheezing, chills or hemoptysis; No shortness of breath  CARDIOVASCULAR: No chest pain, palpitations, dizziness, or leg swelling  GASTROINTESTINAL: No abdominal pain. No nausea, vomiting, or hematemesis; No diarrhea or constipation. No melena or hematochezia.  GENITOURINARY: No dysuria or hematuria, urinary frequency  NEUROLOGICAL: No headaches, memory loss, loss of strength, numbness, or tremors  SKIN: +SKIN CHANGES ON POSTERIOR RLE CALF    MEDICATIONS  (STANDING):  amLODIPine   Tablet 5 milliGRAM(s) Oral daily  atorvastatin 40 milliGRAM(s) Oral at bedtime  budesonide 160 MICROgram(s)/formoterol 4.5 MICROgram(s) Inhaler 2 Puff(s) Inhalation two times a day  calcitriol   Capsule 0.5 MICROGram(s) Oral daily  carvedilol 25 milliGRAM(s) Oral daily  cefepime   IVPB 1000 milliGRAM(s) IV Intermittent every 24 hours  cinacalcet 30 milliGRAM(s) Oral daily  clopidogrel Tablet 75 milliGRAM(s) Oral daily  escitalopram 20 milliGRAM(s) Oral daily  furosemide    Tablet 80 milliGRAM(s) Oral daily  heparin   Injectable 5000 Unit(s) SubCutaneous every 8 hours  hydrALAZINE 50 milliGRAM(s) Oral daily  hydrOXYzine hydrochloride 25 milliGRAM(s) Oral two times a day  mirtazapine 15 milliGRAM(s) Oral at bedtime  montelukast 10 milliGRAM(s) Oral daily  nicotine -   7 mG/24Hr(s) Patch 1 Patch Transdermal daily  QUEtiapine 300 milliGRAM(s) Oral at bedtime  risperiDONE   Tablet 2 milliGRAM(s) Oral two times a day  sevelamer carbonate 800 milliGRAM(s) Oral three times a day with meals  tiotropium 2.5 MICROgram(s) Inhaler 2 Puff(s) Inhalation daily  traZODone 100 milliGRAM(s) Oral daily    MEDICATIONS  (PRN):  acetaminophen     Tablet .. 650 milliGRAM(s) Oral every 6 hours PRN Temp greater or equal to 38C (100.4F), Mild Pain (1 - 3)  albuterol    90 MICROgram(s) HFA Inhaler 2 Puff(s) Inhalation every 6 hours PRN Shortness of Breath and/or Wheezing      Vital Signs Last 24 Hrs  T(C): 36.9 (25 Feb 2023 05:52), Max: 37.1 (24 Feb 2023 21:10)  T(F): 98.4 (25 Feb 2023 05:52), Max: 98.8 (24 Feb 2023 21:10)  HR: 76 (25 Feb 2023 05:52) (62 - 80)  BP: 179/82 (25 Feb 2023 05:52) (162/75 - 184/90)  BP(mean): --  RR: 18 (25 Feb 2023 05:52) (17 - 18)  SpO2: 99% (25 Feb 2023 05:52) (97% - 100%)    Parameters below as of 25 Feb 2023 05:52  Patient On (Oxygen Delivery Method): room air        PHYSICAL EXAMINATION:  GENERAL: Morbid obese male, NAD, Not wearing NC during exam  HEAD:  Atraumatic, Normocephalic  EYES:  Conjunctiva and sclera clear, pupils are equal, round, and reactive to light and accommodation.  NECK: Supple, No JVD, trachea is midline, no evidence of thyroid enlargement, no lymphadenopathy or tenderness.  CHEST/LUNG: Clear to auscultation; No rales, rhonchi, wheezing, or rubs  HEART: Regular rate and rhythm; No murmurs, rubs, or gallops  ABDOMEN: Soft, Nontender, Nondistended; Bowel sounds present  NERVOUS SYSTEM:  Alert & Oriented X3; No focal sensory or motor deficits are noted; Recent and remote memory is intact; Appropriate mood and affect.  EXTREMITIES:  2+ Peripheral Pulses, No clubbing, cyanosis, or edema; right lower extremity swelling, erythema and warm/tenderness/tense to touch.  SKIN: Warm, dry, and well perfused; Good turgor; No lesions, nodules or rashes are noted.                          9.9    5.08  )-----------( 205      ( 25 Feb 2023 05:40 )             33.2     02-25    133<L>  |  94<L>  |  34<H>  ----------------------------<  130<H>  4.9   |  28  |  6.04<H>    Ca    9.2      25 Feb 2023 05:40  Phos  8.2     02-25  Mg     2.3     02-25    TPro  8.2  /  Alb  3.1<L>  /  TBili  0.5  /  DBili  x   /  AST  13  /  ALT  17  /  AlkPhos  110  02-25    LIVER FUNCTIONS - ( 25 Feb 2023 05:40 )  Alb: 3.1 g/dL / Pro: 8.2 g/dL / ALK PHOS: 110 U/L / ALT: 17 U/L DA / AST: 13 U/L / GGT: x               PT/INR - ( 24 Feb 2023 12:30 )   PT: 11.8 sec;   INR: 0.99 ratio         PTT - ( 24 Feb 2023 12:30 )  PTT:33.8 sec

## 2023-02-25 NOTE — PROGRESS NOTE ADULT - ATTENDING COMMENTS
#RLE cellulitis   #ESRD on HD   #COPD on 3L Home O2  #Anemia of chronic disease in setting of ESRD  #HTN  #HLD  #Schizophrenia  #Bipolar Disorder  #DVT ppx    42yM with PMH of ESRD on HD (MWF), COPD on home O2, morbid obesity, ACD 2/2 ESRD, schizophrenia, bipolar disorder, who presented with RLE redness, warmth, and pain. Admitted for RLE cellulitis.     -WBC wnl, afebrile, hemodynamically stable; RLE mildly erythematous  -s/p cefepime and vancomycin in the ED; will continue cefepime for now as IV Clindamycin is on shortage. If BCx are negative, patient can be de-escalated to PO clindamycin  -Pain control PRN  -Continue HD on MWF, Dr. La consulted   -Anemia of chronic disease in setting of ESRD, Hgb stable  -On 3L home O2, titrate oxygen to keep saturation 88-92%  -Continue home meds for chronic conditions  -F/U BCx  -DVT ppx: Heparin

## 2023-02-25 NOTE — PROGRESS NOTE ADULT - PROBLEM SELECTOR PLAN 1
X ray right tibia/fibula showed no fracture, and moderate generalized swelling.   US lower ext negative for DVT  Lactate 1.3 and WBC 5.71. Patient received Cefepime 1g and Vancomycin 2g in the ED  Patient allergic to penicillin. Pharmacy has a shortage of IV Clindamycin.   Will do Cefepime 1g q24 for now  F/U Blood cultures. If positive, consider Vancomycin on HD days. - X ray right tibia/fibula showed no fracture, and moderate generalized swelling.   - US lower ext negative for DVT  - Lactate 1.3 and WBC 5.71. Patient received Cefepime 1g and Vancomycin 2g in the ED  - Patient allergic to penicillin. Pharmacy has a shortage of IV Clindamycin.   - Continue Cefepime 1g q24 for now, if cultures negative can switch to PO  - F/U Blood cultures. If positive, consider Vancomycin on HD days.

## 2023-02-25 NOTE — CONSULT NOTE ADULT - ASSESSMENT
# ESRD. resume HD MWF while hospitalized  # anemia of CKD epogen on hemodialysis  # RENAL OSTEODYSTROPHY. PHOS IS HIGH. INCREASE RENVELA DOSE TO 1600MG TID AC, CONT SENSIPAR  # CELLULITIS. ON ABX

## 2023-02-26 ENCOUNTER — TRANSCRIPTION ENCOUNTER (OUTPATIENT)
Age: 43
End: 2023-02-26

## 2023-02-26 VITALS
TEMPERATURE: 98 F | DIASTOLIC BLOOD PRESSURE: 82 MMHG | SYSTOLIC BLOOD PRESSURE: 169 MMHG | OXYGEN SATURATION: 99 % | RESPIRATION RATE: 20 BRPM | HEART RATE: 77 BPM

## 2023-02-26 LAB
ALBUMIN SERPL ELPH-MCNC: 2.8 G/DL — LOW (ref 3.5–5)
ALP SERPL-CCNC: 99 U/L — SIGNIFICANT CHANGE UP (ref 40–120)
ALT FLD-CCNC: 17 U/L DA — SIGNIFICANT CHANGE UP (ref 10–60)
ANION GAP SERPL CALC-SCNC: 11 MMOL/L — SIGNIFICANT CHANGE UP (ref 5–17)
AST SERPL-CCNC: 9 U/L — LOW (ref 10–40)
BASOPHILS # BLD AUTO: 0.02 K/UL — SIGNIFICANT CHANGE UP (ref 0–0.2)
BASOPHILS NFR BLD AUTO: 0.3 % — SIGNIFICANT CHANGE UP (ref 0–2)
BILIRUB SERPL-MCNC: 0.3 MG/DL — SIGNIFICANT CHANGE UP (ref 0.2–1.2)
BUN SERPL-MCNC: 52 MG/DL — HIGH (ref 7–18)
CALCIUM SERPL-MCNC: 8.8 MG/DL — SIGNIFICANT CHANGE UP (ref 8.4–10.5)
CHLORIDE SERPL-SCNC: 97 MMOL/L — SIGNIFICANT CHANGE UP (ref 96–108)
CO2 SERPL-SCNC: 27 MMOL/L — SIGNIFICANT CHANGE UP (ref 22–31)
CREAT SERPL-MCNC: 8.27 MG/DL — HIGH (ref 0.5–1.3)
EGFR: 8 ML/MIN/1.73M2 — LOW
EOSINOPHIL # BLD AUTO: 0.22 K/UL — SIGNIFICANT CHANGE UP (ref 0–0.5)
EOSINOPHIL NFR BLD AUTO: 3.6 % — SIGNIFICANT CHANGE UP (ref 0–6)
GLUCOSE SERPL-MCNC: 151 MG/DL — HIGH (ref 70–99)
HBV SURFACE AB SER-ACNC: REACTIVE
HBV SURFACE AG SER-ACNC: SIGNIFICANT CHANGE UP
HCT VFR BLD CALC: 30.5 % — LOW (ref 39–50)
HGB BLD-MCNC: 9.3 G/DL — LOW (ref 13–17)
IMM GRANULOCYTES NFR BLD AUTO: 0.3 % — SIGNIFICANT CHANGE UP (ref 0–0.9)
LYMPHOCYTES # BLD AUTO: 0.66 K/UL — LOW (ref 1–3.3)
LYMPHOCYTES # BLD AUTO: 10.9 % — LOW (ref 13–44)
MAGNESIUM SERPL-MCNC: 2.4 MG/DL — SIGNIFICANT CHANGE UP (ref 1.6–2.6)
MCHC RBC-ENTMCNC: 29.4 PG — SIGNIFICANT CHANGE UP (ref 27–34)
MCHC RBC-ENTMCNC: 30.5 GM/DL — LOW (ref 32–36)
MCV RBC AUTO: 96.5 FL — SIGNIFICANT CHANGE UP (ref 80–100)
MONOCYTES # BLD AUTO: 0.67 K/UL — SIGNIFICANT CHANGE UP (ref 0–0.9)
MONOCYTES NFR BLD AUTO: 11.1 % — SIGNIFICANT CHANGE UP (ref 2–14)
NEUTROPHILS # BLD AUTO: 4.45 K/UL — SIGNIFICANT CHANGE UP (ref 1.8–7.4)
NEUTROPHILS NFR BLD AUTO: 73.8 % — SIGNIFICANT CHANGE UP (ref 43–77)
NRBC # BLD: 0 /100 WBCS — SIGNIFICANT CHANGE UP (ref 0–0)
PHOSPHATE SERPL-MCNC: 9.3 MG/DL — SIGNIFICANT CHANGE UP (ref 2.5–4.5)
PLATELET # BLD AUTO: 220 K/UL — SIGNIFICANT CHANGE UP (ref 150–400)
POTASSIUM SERPL-MCNC: 4.9 MMOL/L — SIGNIFICANT CHANGE UP (ref 3.5–5.3)
POTASSIUM SERPL-SCNC: 4.9 MMOL/L — SIGNIFICANT CHANGE UP (ref 3.5–5.3)
PROT SERPL-MCNC: 7.6 G/DL — SIGNIFICANT CHANGE UP (ref 6–8.3)
RBC # BLD: 3.16 M/UL — LOW (ref 4.2–5.8)
RBC # FLD: 15.5 % — HIGH (ref 10.3–14.5)
SODIUM SERPL-SCNC: 135 MMOL/L — SIGNIFICANT CHANGE UP (ref 135–145)
WBC # BLD: 6.04 K/UL — SIGNIFICANT CHANGE UP (ref 3.8–10.5)
WBC # FLD AUTO: 6.04 K/UL — SIGNIFICANT CHANGE UP (ref 3.8–10.5)

## 2023-02-26 PROCEDURE — 99239 HOSP IP/OBS DSCHRG MGMT >30: CPT | Mod: GC

## 2023-02-26 RX ORDER — ERYTHROPOIETIN 10000 [IU]/ML
4000 INJECTION, SOLUTION INTRAVENOUS; SUBCUTANEOUS
Refills: 0 | Status: DISCONTINUED | OUTPATIENT
Start: 2023-02-26 | End: 2023-02-26

## 2023-02-26 RX ORDER — PANTOPRAZOLE SODIUM 20 MG/1
40 TABLET, DELAYED RELEASE ORAL ONCE
Refills: 0 | Status: COMPLETED | OUTPATIENT
Start: 2023-02-26 | End: 2023-02-26

## 2023-02-26 RX ADMIN — Medication 80 MILLIGRAM(S): at 05:57

## 2023-02-26 RX ADMIN — SEVELAMER CARBONATE 800 MILLIGRAM(S): 2400 POWDER, FOR SUSPENSION ORAL at 11:28

## 2023-02-26 RX ADMIN — BUDESONIDE AND FORMOTEROL FUMARATE DIHYDRATE 2 PUFF(S): 160; 4.5 AEROSOL RESPIRATORY (INHALATION) at 11:27

## 2023-02-26 RX ADMIN — RISPERIDONE 2 MILLIGRAM(S): 4 TABLET ORAL at 05:58

## 2023-02-26 RX ADMIN — SEVELAMER CARBONATE 800 MILLIGRAM(S): 2400 POWDER, FOR SUSPENSION ORAL at 08:06

## 2023-02-26 RX ADMIN — CARVEDILOL PHOSPHATE 25 MILLIGRAM(S): 80 CAPSULE, EXTENDED RELEASE ORAL at 05:57

## 2023-02-26 RX ADMIN — HEPARIN SODIUM 5000 UNIT(S): 5000 INJECTION INTRAVENOUS; SUBCUTANEOUS at 05:57

## 2023-02-26 RX ADMIN — PANTOPRAZOLE SODIUM 40 MILLIGRAM(S): 20 TABLET, DELAYED RELEASE ORAL at 02:20

## 2023-02-26 RX ADMIN — ESCITALOPRAM OXALATE 20 MILLIGRAM(S): 10 TABLET, FILM COATED ORAL at 11:29

## 2023-02-26 RX ADMIN — Medication 50 MILLIGRAM(S): at 05:57

## 2023-02-26 RX ADMIN — TIOTROPIUM BROMIDE 2 PUFF(S): 18 CAPSULE ORAL; RESPIRATORY (INHALATION) at 11:27

## 2023-02-26 RX ADMIN — CINACALCET 30 MILLIGRAM(S): 30 TABLET, FILM COATED ORAL at 11:29

## 2023-02-26 RX ADMIN — CALCITRIOL 0.5 MICROGRAM(S): 0.5 CAPSULE ORAL at 11:28

## 2023-02-26 RX ADMIN — Medication 100 MILLIGRAM(S): at 11:29

## 2023-02-26 RX ADMIN — MONTELUKAST 10 MILLIGRAM(S): 4 TABLET, CHEWABLE ORAL at 11:29

## 2023-02-26 RX ADMIN — AMLODIPINE BESYLATE 5 MILLIGRAM(S): 2.5 TABLET ORAL at 05:57

## 2023-02-26 RX ADMIN — CEFEPIME 100 MILLIGRAM(S): 1 INJECTION, POWDER, FOR SOLUTION INTRAMUSCULAR; INTRAVENOUS at 08:42

## 2023-02-26 RX ADMIN — CLOPIDOGREL BISULFATE 75 MILLIGRAM(S): 75 TABLET, FILM COATED ORAL at 11:29

## 2023-02-26 RX ADMIN — Medication 25 MILLIGRAM(S): at 05:57

## 2023-02-26 RX ADMIN — HEPARIN SODIUM 5000 UNIT(S): 5000 INJECTION INTRAVENOUS; SUBCUTANEOUS at 11:28

## 2023-02-26 NOTE — PROGRESS NOTE ADULT - ASSESSMENT
# ESRD. resume HD MWF while hospitalized. HD in am. consent in chart  # anemia of CKD epogen on hemodialysis  # RENAL OSTEODYSTROPHY. PHOS IS HIGH. INCREASE RENVELA DOSE TO 1600MG TID AC, CONT SENSIPAR  # CELLULITIS. ON ABX
A 42 year old male, from home, ambulating independently, w/ PMHx of  Obesity, HTN, Schizophrenia, COPD on 3L NC, ESRD on HD M/W/F, presents to ED complaining of  RLE pain that started 3 days ago. Admitted to medicine for cellulitis management.

## 2023-02-26 NOTE — DISCHARGE NOTE PROVIDER - CARE PROVIDER_API CALL
[facility]  [address] [place] [address]  Phone: [phone]  Fax: [phone]  Follow Up Time:

## 2023-02-26 NOTE — PROGRESS NOTE ADULT - SUBJECTIVE AND OBJECTIVE BOX
[facility] : Progress Note :: [phone], (office [phone]),   Dr La / Dr Hui / Dr Barber / Dr Villalobos / Dr Hang CASTELLANO / Dr Mehta / Dr Sanchez / Dr Alber dumont  _____________________________________________________________________________________________    leg swelling pain better    penicillin (Anaphylaxis)  Seafood (Hives)  shellfish (Anaphylaxis)    Hospital Medications:   MEDICATIONS  (STANDING):  amLODIPine   Tablet 5 milliGRAM(s) Oral daily  atorvastatin 40 milliGRAM(s) Oral at bedtime  budesonide 160 MICROgram(s)/formoterol 4.5 MICROgram(s) Inhaler 2 Puff(s) Inhalation two times a day  calcitriol   Capsule 0.5 MICROGram(s) Oral daily  carvedilol 25 milliGRAM(s) Oral daily  cefepime   IVPB 1000 milliGRAM(s) IV Intermittent every 24 hours  cinacalcet 30 milliGRAM(s) Oral daily  clopidogrel Tablet 75 milliGRAM(s) Oral daily  epoetin cyndie-epbx (RETACRIT) Injectable 4000 Unit(s) IV Push <User Schedule>  escitalopram 20 milliGRAM(s) Oral daily  furosemide    Tablet 80 milliGRAM(s) Oral daily  heparin   Injectable 5000 Unit(s) SubCutaneous every 8 hours  hydrALAZINE 50 milliGRAM(s) Oral daily  hydrOXYzine hydrochloride 25 milliGRAM(s) Oral two times a day  mirtazapine 15 milliGRAM(s) Oral at bedtime  montelukast 10 milliGRAM(s) Oral daily  nicotine -   7 mG/24Hr(s) Patch 1 Patch Transdermal daily  QUEtiapine 300 milliGRAM(s) Oral at bedtime  risperiDONE   Tablet 2 milliGRAM(s) Oral two times a day  sevelamer carbonate 800 milliGRAM(s) Oral three times a day with meals  tiotropium 2.5 MICROgram(s) Inhaler 2 Puff(s) Inhalation daily  traZODone 100 milliGRAM(s) Oral daily        VITALS:  T(F): 98.2 (02-26-23 @ 10:35), Max: 98.7 (02-25-23 @ 13:02)  HR: 77 (02-26-23 @ 10:35)  BP: 174/69 (02-26-23 @ 10:35)  RR: 18 (02-26-23 @ 10:35)  SpO2: 99% (02-26-23 @ 10:35)  Wt(kg): --      PHYSICAL EXAM:  Constitutional: NAD  HEENT: anicteric sclera, oropharynx clear,  Neck: No JVD  Respiratory: CTAB, no wheezes, rales or rhonchi  Cardiovascular: S1, S2, RRR  Gastrointestinal: BS+, soft, NT/ND  Extremities: peripheral edema+  Neurological: A/O x 3, no focal deficits  Vascular Access: AVF with thrill and bruit     LABS:  02-26    135  |  97  |  52<H>  ----------------------------<  151<H>  4.9   |  27  |  8.27<H>    Ca    8.8      26 Feb 2023 05:34  Phos  9.3     02-26  Mg     2.4     02-26    TPro  7.6  /  Alb  2.8<L>  /  TBili  0.3  /  DBili      /  AST  9<L>  /  ALT  17  /  AlkPhos  99  02-26    Creatinine Trend: 8.27 <--, 6.04 <--, 4.28 <--                        9.3    6.04  )-----------( 220      ( 26 Feb 2023 05:34 )             30.5     Urine Studies:      RADIOLOGY & ADDITIONAL STUDIES:

## 2023-02-26 NOTE — DISCHARGE NOTE PROVIDER - NSDCMRMEDTOKEN_GEN_ALL_CORE_FT
AMLODIPINE 5MG TAB:   ATORVASTATIN 40MG TAB:   Calcitriol Oral Capsule 0.5 MCG: TAKE 1 CAPSULE BY MOUTH EVERY DAY  CARVEDILOL 25MG TAB:   CINACALCET TABLET 30 MG: TAKE 1 TABLET BY MOUTH ONCE A DAY  CLOPIDOGREL 75MG TAB:   ESCITALOPRAM 10MG TAB:   ESCITALOPRAM 5MG TAB:   FUROSEMIDE 80MG TAB:   Haldol 2 mg oral tablet: 1 tab(s) orally once a day  HYDRALAZINE 50MG TAB:   hydrOXYzine hydrochloride 25 mg oral tablet: 1 tab(s) orally 2 times a day  MIRTAZAPINE 15MG TAB:   MONTELUKAST 10MG TAB:   QUETIAPINE 300MG TAB:   risperiDONE 2 mg oral tablet: 1 tab(s) orally 2 times a day  SEVELAMER CARB  MG: TAKE 2 TABLETS BY MOUTH THREE TIMES DAILY WITH MEALS  SPIRIVA RESP 1.25ACT INH:   SYMBICORT 160-4.5 INH:   TRAZODONE 100MG TAB:   VENTOLIN  INH:    AMLODIPINE 5MG TAB: 1 tab(s) orally once a day  ATORVASTATIN 40MG TAB: 1 tab(s) orally once a day  Calcitriol Oral Capsule 0.5 MC tab(s) orally once a day  CARVEDILOL 25MG TAB: 1 tab(s) orally once a day  CINACALCET TABLET 30 M tab(s) orally once a day  Cleocin HCl 300 mg oral capsule: 1 cap(s) orally 4 times a day   CLOPIDOGREL 75MG TAB: 1 tab(s) orally once a day  ESCITALOPRAM 10MG TAB: 1 tab(s) orally once a day  FUROSEMIDE 80MG TAB: 1 tab(s) orally once a day  Haldol 2 mg oral tablet: 1 tab(s) orally once a day  HYDRALAZINE 50MG TAB: 1 tab(s) orally once a day  hydrOXYzine hydrochloride 25 mg oral tablet: 1 tab(s) orally 2 times a day  MIRTAZAPINE 15MG TAB: 1 tab(s) orally once a day  MONTELUKAST 10MG TAB: tab(s) orally once a day  QUETIAPINE 300MG TAB: 1 tab(s) orally once a day  risperiDONE 2 mg oral tablet: 1 tab(s) orally 2 times a day  SEVELAMER CARB  M each orally 3 times a day  SPIRIVA RESP 1.25ACT INH: 2 puff(s) inhaled 2 times a day  SYMBICORT 160-4.5 INH: 2 puff(s) inhaled 2 times a day  TRAZODONE 100MG TAB: 1 tab(s) orally once a day  VENTOLIN  INH: 2 puff(s) inhaled 2 times a day

## 2023-02-26 NOTE — DISCHARGE NOTE PROVIDER - NSDCCPCAREPLAN_GEN_ALL_CORE_FT
PRINCIPAL DISCHARGE DIAGNOSIS  Diagnosis: Cellulitis of left lower extremity  Assessment and Plan of Treatment: You presented to the hospital due to an infection of your left leg. Your vitals were stable during your hospital course. You were started on antibiotics. Blood cultures were negative for infection in the blood. You will be switched from IV antibiotics to oral antibiotics. Please continue taking the oral antibiotics clindaymcin for 5 days total after you are discharged. Please follow up with your primary care doctor in 1-2 weeks after you are discharged from the hospital.      SECONDARY DISCHARGE DIAGNOSES  Diagnosis: COPD (chronic obstructive pulmonary disease)  Assessment and Plan of Treatment: You have a history of COPD and you use oxygen at home. You continued to use oxygen during your hospital stay and you were continued on inhalers. Please continue using your oxygen and the inhalers when you are discharged from the hospital and follow up with your primary care doctor in 1-2 weeks.    Diagnosis: HTN (hypertension)  Assessment and Plan of Treatment: You have a history of high blood pressure and you take carvedilol and hydralazine at home to control your blood pressure. You were continued on these medicatoins during your hospital stay. Please continue taking these meds when you are discharged from the hospital and follow up with your primary care doctor in 1-2 weeks.    Diagnosis: ESRD on dialysis  Assessment and Plan of Treatment: You have a history of renal failure and you have dialysis sessions on M,W,F. You were seen by nephrologist Dr. La. Due to your short hospital course, you did not require any dialysis during your hospital stay. Please continue your normal dialysis schedule. Please follow up with your primary care doctor and your nephrologist in 1-2 weeks after you are discharged.     PRINCIPAL DISCHARGE DIAGNOSIS  Diagnosis: Cellulitis of left lower extremity  Assessment and Plan of Treatment: You presented to the hospital due to an infection of your left leg. Your vitals were stable during your hospital course. You were started on antibiotics. Blood cultures were negative for infection in the blood. You will be switched from IV antibiotics to oral antibiotics. Please continue taking the oral antibiotics clindaymcin every 6 hours for 5 days total after you are discharged. Please follow up with your primary care doctor in 1-2 weeks after you are discharged from the hospital.      SECONDARY DISCHARGE DIAGNOSES  Diagnosis: ESRD on dialysis  Assessment and Plan of Treatment: You have a history of renal failure and you have dialysis sessions on M,W,F. You were seen by nephrologist Dr. La. Due to your short hospital course, you did not require any dialysis during your hospital stay. Please continue your normal dialysis schedule. Please follow up with your primary care doctor and your nephrologist in 1-2 weeks after you are discharged.    Diagnosis: COPD (chronic obstructive pulmonary disease)  Assessment and Plan of Treatment: You have a history of COPD and you use oxygen at home. You continued to use oxygen during your hospital stay and you were continued on inhalers. Please continue using your oxygen and the inhalers when you are discharged from the hospital and follow up with your primary care doctor in 1-2 weeks.    Diagnosis: HTN (hypertension)  Assessment and Plan of Treatment: You have a history of high blood pressure and you take carvedilol and hydralazine at home to control your blood pressure. You were continued on these medicatoins during your hospital stay. Please continue taking these meds when you are discharged from the hospital and follow up with your primary care doctor in 1-2 weeks.

## 2023-02-26 NOTE — DISCHARGE NOTE PROVIDER - HOSPITAL COURSE
A 42 year old male, from home, ambulating independently, w/ PMHx of  Obesity, HTN, Schizophrenia, COPD on 3L NC, ESRD on HD M/W/F, presents to ED complaining of  RLE pain that started 3 days ago. Admitted to medicine for cellulitis management.  XR ribia/fibula showed no fracture and moderate generalized swelling. US b/l lower extremities were negative for DVT. Lactate 1.3 and WBC 5.71 on admission. Pt received cefepime and vancomycin in the Ed. Cefepime continued. BCx NGTD x 2. Pt also has a history of ESRD in dialysis (M,W,F). Nephrologist Dr. La consulted. Pt has a history of COPD, anemia, HTN, HLD and schizoprenia, contiued on 3L NC, albuterol, spiriva, and symbicort, carvedilol, hydralazine, atorvastatin, haldol, Trazodone, Hydroxyzine, Mirtazapine, Risperidone, Escitalopram, and Quetiapine. Pt to continue clindamycin for 5 more days for cellulitis. Pt deemed medically stable for discharge

## 2023-02-26 NOTE — DISCHARGE NOTE NURSING/CASE MANAGEMENT/SOCIAL WORK - PATIENT PORTAL LINK FT
You can access the FollowMyHealth Patient Portal offered by [facility] by registering at the following website: [email or web address]. By joining [facility] [facility]’s FollowMyHealth portal, you will also be able to view your health information using other applications (apps) compatible with our system.

## 2023-02-28 ENCOUNTER — APPOINTMENT (OUTPATIENT)
Dept: VASCULAR SURGERY | Facility: CLINIC | Age: 43
End: 2023-02-28

## 2023-03-07 NOTE — PATIENT PROFILE ADULT - SAFE PLACE TO LIVE
Problem: Discharge Planning  Goal: Discharge to home or other facility with appropriate resources  Outcome: Adequate for Discharge  Flowsheets (Taken 3/7/2023 5972)  Discharge to home or other facility with appropriate resources:   Identify barriers to discharge with patient and caregiver   Arrange for needed discharge resources and transportation as appropriate   Identify discharge learning needs (meds, wound care, etc)   Refer to discharge planning if patient needs post-hospital services based on physician order or complex needs related to functional status, cognitive ability or social support system     Problem: Pain  Goal: Verbalizes/displays adequate comfort level or baseline comfort level  Outcome: Adequate for Discharge     Problem: Safety - Adult  Goal: Free from fall injury  Outcome: Adequate for Discharge no

## 2023-03-22 NOTE — PHYSICAL THERAPY INITIAL EVALUATION ADULT - WEIGHT-BEARING RESTRICTIONS: SIT/STAND, REHAB EVAL
full weight-bearing Topical Metronidazole Pregnancy And Lactation Text: This medication is Pregnancy Category B and considered safe during pregnancy.  It is also considered safe to use while breastfeeding.

## 2023-03-23 RX ORDER — BUDESONIDE AND FORMOTEROL FUMARATE DIHYDRATE 160; 4.5 UG/1; UG/1
0 AEROSOL RESPIRATORY (INHALATION)
Qty: 0 | Refills: 0 | DISCHARGE

## 2023-03-23 RX ORDER — HYDRALAZINE HCL 50 MG
0 TABLET ORAL
Qty: 0 | Refills: 0 | DISCHARGE

## 2023-03-23 RX ORDER — RISPERIDONE 4 MG/1
1 TABLET ORAL
Qty: 0 | Refills: 0 | DISCHARGE

## 2023-03-23 RX ORDER — ATORVASTATIN CALCIUM 80 MG/1
0 TABLET, FILM COATED ORAL
Qty: 0 | Refills: 0 | DISCHARGE

## 2023-03-23 RX ORDER — CALCITRIOL 0.5 UG/1
0 CAPSULE ORAL
Qty: 0 | Refills: 4 | DISCHARGE

## 2023-03-23 RX ORDER — TRAZODONE HCL 50 MG
0 TABLET ORAL
Qty: 0 | Refills: 0 | DISCHARGE

## 2023-03-23 RX ORDER — QUETIAPINE FUMARATE 200 MG/1
0 TABLET, FILM COATED ORAL
Qty: 0 | Refills: 0 | DISCHARGE

## 2023-03-23 RX ORDER — SEVELAMER CARBONATE 2400 MG/1
0 POWDER, FOR SUSPENSION ORAL
Qty: 0 | Refills: 1 | DISCHARGE

## 2023-03-23 RX ORDER — CINACALCET 30 MG/1
0 TABLET, FILM COATED ORAL
Qty: 0 | Refills: 2 | DISCHARGE

## 2023-03-23 RX ORDER — FUROSEMIDE 40 MG
0 TABLET ORAL
Qty: 0 | Refills: 0 | DISCHARGE

## 2023-03-23 RX ORDER — MONTELUKAST 4 MG/1
0 TABLET, CHEWABLE ORAL
Qty: 0 | Refills: 0 | DISCHARGE

## 2023-03-23 RX ORDER — CLOPIDOGREL BISULFATE 75 MG/1
0 TABLET, FILM COATED ORAL
Qty: 0 | Refills: 0 | DISCHARGE

## 2023-03-23 RX ORDER — CARVEDILOL PHOSPHATE 80 MG/1
0 CAPSULE, EXTENDED RELEASE ORAL
Qty: 0 | Refills: 0 | DISCHARGE

## 2023-03-23 RX ORDER — MIRTAZAPINE 45 MG/1
0 TABLET, ORALLY DISINTEGRATING ORAL
Qty: 0 | Refills: 0 | DISCHARGE

## 2023-03-23 RX ORDER — ESCITALOPRAM OXALATE 10 MG/1
0 TABLET, FILM COATED ORAL
Qty: 0 | Refills: 0 | DISCHARGE

## 2023-03-23 RX ORDER — ALBUTEROL 90 UG/1
0 AEROSOL, METERED ORAL
Qty: 0 | Refills: 0 | DISCHARGE

## 2023-03-23 RX ORDER — AMLODIPINE BESYLATE 2.5 MG/1
0 TABLET ORAL
Qty: 0 | Refills: 0 | DISCHARGE

## 2023-03-23 RX ORDER — TIOTROPIUM BROMIDE 18 UG/1
0 CAPSULE ORAL; RESPIRATORY (INHALATION)
Qty: 0 | Refills: 0 | DISCHARGE

## 2023-03-28 NOTE — ED ADULT NURSE NOTE - PRIMARY CARE PROVIDER
Pt placed on Spont at 1517, several episodes of apnea noted. Patient met parameters to extubate at 1600. RN & RT agree to extubation. 2L, NIF 53, Pinsp 5, RSBI 16, SpO2 94%. Extubated at 1616. Total intubation time 4 hr 40 min   unk

## 2023-04-04 NOTE — ED ADULT NURSE NOTE - CHPI ED NUR SYMPTOMS POS
----- Message from Marilou Haines MD sent at 4/4/2023 12:02 PM CDT -----  Your testosterone level was within normal range/low normal.  Follow up as planned.  Dr Haines    Please notify patient of results.   PAIN/WEAKNESS

## 2023-05-03 NOTE — PATIENT PROFILE ADULT - FLU SEASON?
----- Message from Carlene Roman sent at 5/3/2023  8:05 AM CDT -----  .Type:  Mammogram    Caller is requesting to schedule their annual mammogram appointment.  Order is not listed in EPIC.  Please enter order and contact patient to schedule.  Name of Caller:Delicia West   Where would they like the mammogram performed? [facility]  Would the patient rather a call back or a response via [facility]? Call back  Best Call Back Number:.[phone]  Additional Information: pt would like an order put in please       
Appt booked and pt notified  
Yes...

## 2023-05-09 NOTE — ED ADULT TRIAGE NOTE - ACCOMPANIED BY
EMT/paramedic
Other (Free Text): Surgical removal offered, she declines intervention at this time.
Render Risk Assessment In Note?: no
Detail Level: Detailed
Note Text (......Xxx Chief Complaint.): This diagnosis correlates with the

## 2023-05-22 NOTE — DIETITIAN INITIAL EVALUATION ADULT. - PERTINENT MEDS FT
show MEDICATIONS  (STANDING):  atorvastatin 40 milliGRAM(s) Oral at bedtime  dextrose 50% Injectable 25 Gram(s) IV Push once  folic acid 1 milliGRAM(s) Oral daily  gabapentin 300 milliGRAM(s) Oral at bedtime  glucagon  Injectable 1 milliGRAM(s) IntraMuscular once  heparin   Injectable 5000 Unit(s) SubCutaneous every 8 hours  hydrocortisone 1% Cream 1 Application(s) Topical two times a day  insulin lispro (ADMELOG) corrective regimen sliding scale   SubCutaneous three times a day before meals  metoprolol tartrate 50 milliGRAM(s) Oral two times a day  mirtazapine 15 milliGRAM(s) Oral at bedtime  NIFEdipine XL 30 milliGRAM(s) Oral daily  QUEtiapine 50 milliGRAM(s) Oral at bedtime  risperiDONE   Tablet 2 milliGRAM(s) Oral daily  senna 2 Tablet(s) Oral at bedtime  sevelamer carbonate 1600 milliGRAM(s) Oral three times a day with meals

## 2023-05-23 NOTE — ED PROVIDER NOTE - CPE EDP MUSC NORM
Detail Level: Detailed Additional Notes (Optional): All is well healed following cryosurgery.  No need for further cryo today. Hide Additional Notes?: No Additional Notes (Optional): All areas have healed as expected and he can watch for regrowth. - - -

## 2023-05-25 NOTE — PROGRESS NOTE ADULT - SUBJECTIVE AND OBJECTIVE BOX
TRAUMA SURGERY PROGRESS NOTE    Patient: PRESTON JOHNSON , 42y ([birth year])Male   MRN: [id number]  Location: [address]   Visit: 11-20-22 Inpatient  Date: 11-21-22 @ 09:27        Events of past 24 hours: Pt removed c collar overnight.    PAST MEDICAL & SURGICAL HISTORY:  Morbid obesity with BMI of 40.0-44.9, adult      ESRD on dialysis      Bipolar disorder      DM (diabetes mellitus)      HTN (hypertension)      Migraine      COPD (chronic obstructive pulmonary disease)      Renal failure      Asthma with COPD      Diabetes mellitus, type 2      Hypertension      Schizophrenia      Anxiety      H/O hernia repair      S/P arteriovenous (AV) fistula creation          PHYSICAL EXAM:  General: NAD  HEENT: Normocephalic, atraumatic, EOMI, PEERLA. no scalp lacerations   Neck: Soft, midline trachea. no c-spine tenderness  Chest: No chest wall tenderness, no subcutaneous emphysema   Cardiac: S1, S2, RRR  Respiratory: Bilateral breath sounds, clear and equal bilaterally  Abdomen: Soft, non-distended, non-tender, no rebound, no guarding.  Groin: Normal appearing, pelvis stable   Ext:  Moving b/l upper and lower extremities. Palpable Radial b/l UE, b/l DP palpable in LE.   Back: No T/L/S spine tenderness, No palpable runoff/stepoff/deformity  Rectal: No laurie blood, DAVID with good tone    MEDICATIONS  (STANDING):  ceFAZolin   IVPB 1000 milliGRAM(s) IV Intermittent every 24 hours  coronavirus bivalent (EUA) Booster Vaccine (PFIZER) 0.3 milliLiter(s) IntraMuscular once  mirtazapine 15 milliGRAM(s) Oral at bedtime  PARoxetine 20 milliGRAM(s) Oral daily  risperiDONE   Tablet 2 milliGRAM(s) Oral daily  sevelamer carbonate 800 milliGRAM(s) Oral three times a day with meals  simvastatin 40 milliGRAM(s) Oral at bedtime    MEDICATIONS  (PRN):  acetaminophen     Tablet .. 650 milliGRAM(s) Oral every 6 hours PRN Temp greater or equal to 38C (100.4F), Mild Pain (1 - 3)  albuterol/ipratropium for Nebulization 3 milliLiter(s) Nebulizer every 6 hours PRN Shortness of Breath and/or Wheezing  HYDROmorphone   Tablet 2 milliGRAM(s) Oral every 4 hours PRN Severe Pain (7 - 10)      DVT PROPHYLAXIS: SCDs,   GI PROPHYLAXIS:   ANTICOAGULATION:   ANTIBIOTICS: ceFAZolin   IVPB 1000 milliGRAM(s)            LAB/STUDIES:  Labs:  CAPILLARY BLOOD GLUCOSE                              8.3    8.46  )-----------( 122      ( 21 Nov 2022 04:53 )             25.8         11-21    129<L>  |  84<L>  |  100<H>  ----------------------------<  97  5.6<H>   |  20<L>  |  13.97<H>      Calcium, Total Serum: 8.8 mg/dL (11-21-22 @ 04:53)      LFTs:             7.5  | 0.8  | 36       ------------------[146     ( 21 Nov 2022 04:53 )  3.4  | x    | 28          Lipase:x      Amylase:x         Lactate, Blood: 2.0 mmol/L (11-19-22 @ 18:44)      Coags:     13.2   ----< 1.15    ( 21 Nov 2022 04:53 )     35.4                    Culture - Blood (collected 19 Nov 2022 18:00)  Source: .Blood Blood-Peripheral  Gram Stain (20 Nov 2022 13:24):    Growth in aerobic bottle: Gram Positive Cocci in Clusters    Growth in anaerobic bottle: Gram Positive Cocci in Clusters  Preliminary Report (20 Nov 2022 13:24):    Growth in aerobic bottle: Gram Positive Cocci in Clusters    Growth in anaerobic bottle: Gram Positive Cocci in Clusters    Culture - Blood (collected 19 Nov 2022 17:50)  Source: .Blood Blood-Peripheral  Gram Stain (20 Nov 2022 13:27):    Growth in aerobic bottle: Gram Positive Cocci in Clusters    Growth in anaerobic bottle: Gram Positive Cocci in Clusters  Preliminary Report (21 Nov 2022 09:14):    Growth in aerobic and anaerobic bottles: Staphylococcus aureus    Susceptibility to follow.    ***Blood Panel PCR results on this specimen are available    approximately 3 hours after the Gram stain result.***    Gram stain, PCR, and/or culture results maynot always    correspond due to difference in methodologies.    ************************************************************    This PCR assay was performed by multiplex PCR. This    Assay tests for 66 bacterial and resistance gene targets.    Please refer to the [facility] test directory    at [email or web address] for details.  Organism: Blood Culture PCR (20 Nov 2022 12:46)  Organism: Blood Culture PCR (20 Nov 2022 12:46)              IMAGING:        ASSESSMENT:  42y Male  w/ PMHx of *** seen as (Code Trauma / Trauma Alert / Trauma Consult) s/p ****** with complaint of *** , external signs of trauma include *** . Trauma assessment in ED with the following injuries identifed: *******     PLAN:   -   -  -  -       Pinch Graft Text: The defect edges were debeveled with a #15 scalpel blade. Given the location of the defect, shape of the defect and the proximity to free margins a pinch graft was deemed most appropriate. Using a sterile surgical marker, the primary defect shape was transferred to the donor site. The area thus outlined was incised deep to adipose tissue with a #15 scalpel blade.  The harvested graft was then trimmed of adipose tissue until only dermis and epidermis was left. The skin margins of the secondary defect were undermined to an appropriate distance in all directions utilizing iris scissors.  The secondary defect was closed with interrupted buried subcutaneous sutures.  The skin edges were then re-apposed with running  sutures.  The skin graft was then placed in the primary defect and oriented appropriately.

## 2023-07-12 NOTE — PROGRESS NOTE ADULT - PROBLEM SELECTOR PLAN 6
Assessment/Plan:  Pap smear deferred due to low risk status. Encourage self breast examination as well as calcium supplementation. Continue annual mammogram.  Reviewed colon cancer screening, considering colonoscopy over the next 1 year. Reviewed perimenopausal symptoms. She will keep a menstrual diary. Discussed physical findings consistent with hyperpigmented lesion along left side of perineum, offered dermatology evaluation versus returning to office for vulvar biopsy. Patient will return to office for biopsy, reviewed postprocedure care. All questions answered. No problem-specific Assessment & Plan notes found for this encounter. Diagnoses and all orders for this visit:    Encounter for annual routine gynecological examination    Encounter for screening mammogram for malignant neoplasm of breast  -     Mammo screening bilateral w 3d & cad; Future    Vulval lesion          Subjective:      Patient ID: Eun Bueno is a 53 y.o. female. HPI     This is a pleasant 71-year-old female P2 ( x2, age 17, 16) presents for GYN exam.  She has been off birth control pills since 2021. She then restarted OCPs 2022 and had significant mood swings and opted to discontinue shortly thereafter. Her menstrual cycles are usually every 32 days lasting 3 to 5 days. She states her last menstrual cycle was approximately 2 weeks late. She denies any changes in bowel or bladder function. She has been in a monogamous relationship for over 23 years. Pap smears have been normal.  Last Pap . She continues to take DIM plus for mood swings irritability with improvement. She also started using a progesterone cream.  She does notice significant irritability 1 day prior to menses. She follows up with dermatology once a year. She also sees her primary care physician on a regular basis.     The following portions of the patient's history were reviewed and updated as appropriate: allergies, current medications, past family history, past medical history, past social history, past surgical history and problem list.    Review of Systems   Constitutional: Negative for fatigue, fever and unexpected weight change. Respiratory: Negative for cough, chest tightness, shortness of breath and wheezing. Cardiovascular: Negative. Negative for chest pain and palpitations. Gastrointestinal: Negative. Negative for abdominal distention, abdominal pain, blood in stool, constipation, diarrhea, nausea and vomiting. Genitourinary: Negative. Negative for difficulty urinating, dyspareunia, dysuria, flank pain, frequency, genital sores, hematuria, pelvic pain, urgency, vaginal bleeding, vaginal discharge and vaginal pain. Skin: Negative for rash. Objective:      /78   Ht 5' 6" (1.676 m)   Wt 61.2 kg (135 lb)   LMP 07/01/2023 (Exact Date)   BMI 21.79 kg/m²          Physical Exam  Constitutional:       Appearance: Normal appearance. She is well-developed. Cardiovascular:      Rate and Rhythm: Normal rate and regular rhythm. Pulmonary:      Effort: Pulmonary effort is normal.      Breath sounds: Normal breath sounds. Chest:   Breasts:     Right: No inverted nipple, mass, nipple discharge, skin change or tenderness. Left: No inverted nipple, mass, nipple discharge, skin change or tenderness. Abdominal:      General: Bowel sounds are normal. There is no distension. Palpations: Abdomen is soft. Tenderness: There is no abdominal tenderness. There is no guarding or rebound. Genitourinary:     Labia:         Right: No rash, tenderness or lesion. Left: No rash, tenderness or lesion. Vagina: Normal. No vaginal discharge. Cervix: No discharge or friability. Adnexa:         Right: No mass, tenderness or fullness. Left: No mass, tenderness or fullness. Neurological:      Mental Status: She is alert and oriented to person, place, and time.    Psychiatric: Behavior: Behavior normal.       external genitalia is evident of 2 isolated hyperpigmented lesions just left to the perineum. -not on any home antihypertensives  -pressures here have been variable, if persistently hypertensive can start medications

## 2023-07-13 NOTE — ED ADULT NURSE NOTE - CADM POA URETHRAL CATHETER
Patient:  Alta Hyatt Date:  2023   :  1940 Attending:  Noam King MD   82 year old female      CARDIOLOGY CONSULTATION:  2023       ATTENDING     Noam King MD    CHIEF COMPLAINT     weakness. leg edema / cellulitis. Elevated troponin.    HISTORY     Alta Hyatt is a 82 year old female who is seen at the request of Noam Comer MD for elevated troponin.  History significant for hypertension, hyperlipidemia, diabetes mellitus, Obstructive sleep apnea (compliant with CPAP) and prior tobacco use (quit '91). No significant prior cardiac history, has not previously followed with a cardiologist.  Admitted with progressive weakness, patient was unable to get up off the toilet on day of admission.  She was recently sick for two weeks with a possible GI virus, had significant diarrhea in which she was incontinent of.  Her legs were being treated at home by wound care and were doing well, but with her recent illness and incontinence they started to get worse from a cellulitis / wound standpoint and were also becoming more swollen.  Denies any chest pain / pressure, palpitations, dizziness, orthopnea, PND or syncope. Sleeps in a chair with her CPAP nightly due to prior bilateral hip surgeries.  Reports compliance at home with her medications.  Upon presentation CXR with no acute findings, troponin 1189 with trend to 1664, NTproBNP 6360, Cr 1.2, LFTs stable.     MEDICAL HISTORY     Past Medical History:   Diagnosis Date   • Arthritis    • Diabetes mellitus (CMD)    • Essential (primary) hypertension    • High cholesterol        SURGICAL HISTORY     Past Surgical History:   Procedure Laterality Date   • Appendectomy     • Eye surgery     • Hysterectomy     • Removal gallbladder         SOCIAL HISTORY     Social History     Tobacco Use   • Smoking status: Former     Current packs/day: 0.00     Average packs/day: 0.5 packs/day for 31.0 years (15.5 ttl pk-yrs)     Types: Cigarettes      Start date: 3/1/1960     Quit date: 1991     Years since quittin.5     Passive exposure: Past   • Smokeless tobacco: Never   Vaping Use   • Vaping Use: never used   Substance Use Topics   • Alcohol use: Yes     Alcohol/week: 1.0 standard drink of alcohol     Types: 1 Standard drinks or equivalent per week     Comment: occassionally, 1 serving usually   • Drug use: Never       FAMILY HISTORY     Family History   Problem Relation Age of Onset   • Diabetes Mother    • Heart disease Father    • COPD Daughter        MEDICATIONS     Current Facility-Administered Medications   Medication   • sodium chloride 0.9 % flush bag 25 mL   • sodium chloride (PF) 0.9 % injection 2 mL   • VANCOMYCIN - PHARMACIST MONITORED Misc   • metroNIDAZOLE (FLAGYL) premix IVPB 500 mg   • cefepime (MAXIPIME) 1,000 mg in sodium chloride 0.9 % 100 mL IVPB   • sodium chloride (NORMAL SALINE) 0.9 % bolus 500 mL   • dextrose 50 % injection 12.5 g   • dextrose (GLUTOSE) 40 % gel 15 g   • insulin lispro (ADMELOG,HumaLOG) - Correction Dose   • atorvastatin (LIPITOR) tablet 10 mg   • lisinopril (ZESTRIL) tablet 40 mg   • insulin lispro protamine-insulin lispro (HumaLOG MIX 75/25) (75-25) 100 UNIT/ML injection 50 Units   • ondansetron (ZOFRAN) injection 4 mg   • acetaminophen (TYLENOL) tablet 650 mg   • traMADol (ULTRAM) tablet 50 mg   • polyethylene glycol (MIRALAX) packet 17 g   • aluminum-magnesium hydroxide-simethicone (MAALOX) 200-200-20 MG/5ML suspension 30 mL   • aspirin tablet 325 mg   • pantoprazole (PROTONIX) EC tablet 40 mg   • hydrALAZINE (APRESOLINE) injection 10 mg   • insulin lispro (ADMELOG,HumaLOG) - Correction Dose       ALLERGIES     ALLERGIES:   Allergen Reactions   • Celecoxib DIARRHEA   • Metformin DIARRHEA   • Tramadol DIZZINESS     Dizziness  Dizziness         REVIEW OF SYSTEMS     Constitutional:  Patient denies fever, chills, malaise.    Eyes:  Denies change in vision   HENT:  Denies sinus problems, ear  infections  Respiratory:  Denies cough, PND, orthopnea  Cardiovascular:  Denies chest pain, palpitations, syncope, presyncope   Gastrointestinal:  Denies abdominal pain, nausea, vomiting, bloody stools or diarrhea.    Genitourinary:  Denies urine retention, painful urination, urinary frequency, blood in urine or nocturia.    Musculoskeletal:  Denies back pain, neck pain, joint pain. + leg swelling / chronic lymphedema    Integument:  Denies rash, itching.    Neurologic:  Denies headache, focal weakness or sensory changes.      All other systems reviewed and negative.      PHYSICAL EXAM     Vital Signs:    Vitals:    07/13/23 0420 07/13/23 0500 07/13/23 0502 07/13/23 0752   BP: 132/58  129/73 97/45   BP Location:   LFA - Left forearm LUE - Left upper extremity   Patient Position:   Supine Supine   Pulse: 74  75 60   Resp:   18 18   Temp:   97.7 °F (36.5 °C) 97.9 °F (36.6 °C)   TempSrc:   Oral Oral   SpO2:   97% 97%   Weight:  126 kg (277 lb 12.5 oz)     Height:  5' 3\" (1.6 m)     ,   Ht Readings from Last 1 Encounters:   07/13/23 5' 3\" (1.6 m)   ,   Wt Readings from Last 1 Encounters:   07/13/23 126 kg (277 lb 12.5 oz)   , Body mass index is 49.21 kg/m².    General:  No acute distress, pleasant. Obese.  HEENT:  Normocephalic, atraumatic, EOM (extraocular movements) intact, no scleral icterus or conjunctival pallor  Neck:  Trachea midline, supple, no JVD (jugular venous distension).  Normal carotid upstroke.  Pulmonary:  Reduced breath sound bilaterally  Cardiovascular:  PMI in 5th intercostal space.  Irregualr S1 S2, no S3 or S4 appreciated.  There are no murmurs.  Abdomen:  Bowel sounds normoactive, soft, nontender, distended  Extremities:  No edema or cyanosis, peripheral pulses 2+ and symmetric.  Musculoskeletal:  ROM (Range of motion) and motor strength grossly normal, no joint erythema or swelling.  Skin:  No rashes, jaundice or other lesions.   Neuro:  Alert and oriented X3, no gross motor or sensory deficits.        DIAGNOSTIC STUDIES    XR CHEST PA OR AP 1 VIEW   Final Result   IMPRESSION:      No acute cardiopulmonary disease.      Electronically Signed by: George Dixon MD    Signed on: 7/13/2023 1:24 AM    Workstation ID: [id number]          EKG:  No results found for this or any previous visit.    EKG:  Atrial fibrillation, rate controlled    Laboratory Results:    Recent Labs   Lab 07/13/23  0524 07/13/23  0113   SODIUM 139 137   POTASSIUM 4.4 4.4   BUN 19 20   CREATININE 1.07* 1.24*   GLUCOSE 215* 184*   WBC 28.8* 32.2*   HGB 12.6 13.3   HCT 39.7 41.8    384   CPK  --  312*   TSH 1.517  --    TRIGLYCERIDE 44  --    CHOLESTEROL 81  --    CALCLDL 19  --        ASSESSMENT     1. Elevated troponin  2. Essential hypertension  3. Hyperlipidemia  4. Atrial fibrillation (new onset)  5. Chronic lymphedema  6. Cellulitis  7.  UTI  8. Diabetes Mellitus    PLAN     ECHO pending  Consideration for stress test  Serial troponin  BPs stable. Maintained on Lisinopril 40mg PO daily  New onset atrial fibrillation, currently rates controlled  Will need to discuss anticoagulation   Lovenox x1 given this am   / statin  ID consult. IV antibiotics. Infectious work up underway    Above discussed with MD.  Please call with questions.    Carleen RAMSEYNP  Pgr: [phone]      I personally performed the history taking and physical examination. Agree with the history and physical, past medical history, surgical history, social history, and review of systems. Medication list, vitals, and labs reviewed. I have reviewed and edited the above note as needed. I have personaly formulated the clinical impression and recommendations as stated.      Tahmina Pitt MD  [phone]      Patient presented worsening of dyspnea and UTI.  Patient also had worsening of edema on chronic diuretics patient has new onset of atrial fibrillation elevated troponin patient reported no chest pain    New onset of atrial fibrillation rate control continue  medications I start patient on heparin drip given patient's possibility of having a coronary angiogram    Non ST-elevation myocardial infarction Elevated troponin continue trending we start patient on aspirin Plavix and anticoagulation given patient's risk factor including age diabetes and smoking.  Pending echocardiogram.  Patient is not sure if she like to have any invasive testing she will give us answer later    Hypertension good control continue medications.    Chronic diastolic heart failure consider restart diuretics tomorrow    Chronic nonhealing wound with possible iliac stenosis on the arterial ultrasound, interventional cardiology will perform peripheral angiogram after cardiac evaluation.    Tahmina Pitt MD  [phone]     No

## 2023-07-17 NOTE — PATIENT PROFILE ADULT - NSPROPTRIGHTCAREGIVER_GEN_A_NUR
Is This A New Presentation, Or A Follow-Up?: Skin Lesion How Severe Is Your Skin Lesion?: mild declines

## 2023-07-25 NOTE — PATIENT PROFILE ADULT - NSPROHMDIABETMGMTSTRAT_GEN_A_NUR
[address] [place] [address]   Progress Note and Procedure Note      Arvin Mustafa  MEDICAL RECORD NUMBER:  [id number]  AGE: 65 y.o. GENDER: female  : 1960  EPISODE DATE:  2023    Subjective:     Chief Complaint   Patient presents with    Wound Check     Left foot         HISTORY of PRESENT ILLNESS HPI     Arvin Mustafa is a 65 y.o. female who presents today for wound/ulcer evaluation. History of Wound Context: patient presents today for follow up after I&D of the left foot and ankle. She states she has finished her oral antibiotics. She was able to schedule her laser skin perfusion study. Wound/Ulcer Pain Timing/Severity: none  Quality of pain: N/A  Severity:  0 / 10   Modifying Factors: None  Associated Signs/Symptoms: none    Ulcer Identification:  Ulcer Type: diabetic    Contributing Factors: diabetes and poor glucose control    Acute Wound: N/A    PAST MEDICAL HISTORY        Diagnosis Date    Arthritis     Asthma     exercise/cold induced    Blindness, legal     CVA (cerebral vascular accident) ([address]) 2015    Depression     Diabetes mellitus ([address])     ION (ischemic optic neuropathy)     developed post-op r/t hypotension    Osteomyelitis ([address])     Left foot; finished Vanco. end of .     TIA (transient ischemic attack)        PAST SURGICAL HISTORY    Past Surgical History:   Procedure Laterality Date    BACK SURGERY       SECTION      CHOLECYSTECTOMY      ELBOW SURGERY Left     FOOT DEBRIDEMENT Left 2023    LEFT FOOT INCISION AND DEBRIDEMENT/ POSSIBLE GRAFT APPLICATION/ POSSIBLE WOUND VACUUM APPLICATION performed by Jos Buckley DPM at [address] 2014    INCISION AND DRAINAGE MID FOOT LEFT, ENDOSCOPIC GASTROC    FOOT SURGERY Left 2014    LEFT FOOT EXTERNAL FIXATOR REMOVAL        HYSTERECTOMY (CERVIX STATUS UNKNOWN)      ORTHOPEDIC SURGERY      OTHER SURGICAL HISTORY Left 2014    INCISION AND DRAINAGE LEFT FOOT medication therapy

## 2023-08-11 NOTE — DISCHARGE NOTE NURSING/CASE MANAGEMENT/SOCIAL WORK - NSDCDPATPORTLINK_GEN_ALL_CORE
You can access the [facility] Patient Portal, offered by [facility], by registering with the following website: [email or web address] Was A Bandage Applied: Yes

## 2023-08-15 NOTE — ED PROVIDER NOTE - NSFOLLOWUPCLINICS_GEN_ALL_ED_FT
[facility]  Podiatry/Wound Care  [address] [place] [address]  Phone: [phone]  Fax: [phone]  Follow Up Time: O-T Advancement Flap Text: Because of the full-thickness nature of the defect and location adjacent to important structure(s), a bilateral advancement flap (A to T) was planned. After prep and anesthesia, a Burow's triangle was excised adjacent to the defect.  Incisions were extended from the opposite side of the wound, and smaller Burow’s triangles at the end of each incision.  Two flaps were created by undermining in the subcutaneous plane. After hemostasis was obtained, the flaps were advanced and sutured in a layered fashion.

## 2023-08-15 NOTE — H&P ADULT - PROBLEM/PLAN-2
Show Aperture Variable?: Yes Duration Of Freeze Thaw-Cycle (Seconds): 1 Render Note In Bullet Format When Appropriate: No Post-Care Instructions: I reviewed with the patient in detail post-care instructions. Patient is to wear sunprotection, and avoid picking at any of the treated lesions. Pt may apply Vaseline to crusted or scabbing areas. Number Of Freeze-Thaw Cycles: 4 freeze-thaw cycles Detail Level: Detailed Application Tool (Optional): Liquid Nitrogen Sprayer Consent: The patient's consent was obtained including but not limited to risks of crusting, scabbing, blistering, scarring, darker or lighter pigmentary change, recurrence, incomplete removal and infection. DISPLAY PLAN FREE TEXT

## 2023-08-31 NOTE — ED ADULT TRIAGE NOTE - LOCATION:
Infusion Nursing Note:  Maria E Webster presents today for evenity.    Patient seen by provider today: No   present during visit today: Not Applicable.    Note: N/A.      Intravenous Access:  No Intravenous access/labs at this visit.    Treatment Conditions:  Results reviewed, labs MET treatment parameters, ok to proceed with treatment.  7/31/23 creat 0.56, calcium 8.9    Post Infusion Assessment:  Patient tolerated injection without incident.       Discharge Plan:   Discharge instructions reviewed with: Patient.  Patient and/or family verbalized understanding of discharge instructions and all questions answered.  Patient discharged in stable condition accompanied by: self.  Departure Mode: Ambulatory.      Cindy Lira RN     Left arm;

## 2023-09-08 NOTE — ED ADULT NURSE NOTE - CARDIO WDL
3 nitro and 324 of ASA given en route via EMS. 18g PIV placed in the [address] [place] [address]. EKG obtained & signed by EDMD. P on cardiac monitoring. Lacy Munroe at bedside.       Angelica Kendall RN  09/08/23 0555
Pt arrived to dept via [facility] from home. Pt c/o chest pain that started prior to EMS arrival with a syncopal episode. Pt states he remembers waking up in the ambulance but nothing in between that and seeing lights after chest pain started. Pt awake, alert and oriented x 3. Skin warm and dry/normal color for ethnicity. Resp easy and unlabored. Pt placed on cardiac monitor. Call light in reach. Side rails x 2.       Tabitha Monroy RN  09/08/23 5070
Pt called out with more chest discomfort.  Madeline Kaminski aware; see new orders     Maria Esther Erickson RN  09/08/23 0257
Report given to receiving RUEL Bedoya RN  09/08/23 8058
Normal rate, regular rhythm, normal S1, S2 heart sounds heard.

## 2023-09-11 NOTE — PROGRESS NOTE ADULT - PROVIDER SPECIALTY LIST ADULT
[facility] Radiology Internal Medicine Abormal VS: Temp > 100F or < 96.8F; SBP < 90 mmHG; HR > 120bpm; Resp > 24/min

## 2023-09-13 NOTE — DISCHARGE NOTE PROVIDER - ATTENDING DISCHARGE PHYSICAL EXAMINATION:
Pt returned to [facility] room 19. Family present. Pt offered food and drink. Call light within reach. Constitutional/General: Morbid obesity, vitals reviewed  EYE: Symmetrical pupils, conjunctiva clear   ENT: Good dentition, oropharynx clear  NECK: No visual masses, no JVD  CHEST: No respiratory distress, bilateral symmetrical chest rise  ABDOMEN: Nondistended, no visual masses  SKIN: No rash, warm, dry  NEURO: A+Ox3, Cranial nerves grossly intact, moves all extremities, follows commands  PSYCH: Normal mood, normal affect

## 2023-09-27 PROBLEM — N18.6 END STAGE RENAL DISEASE: Chronic | Status: ACTIVE | Noted: 2023-02-24

## 2023-09-27 PROBLEM — J44.9 CHRONIC OBSTRUCTIVE PULMONARY DISEASE, UNSPECIFIED: Chronic | Status: ACTIVE | Noted: 2023-02-24

## 2023-09-27 PROBLEM — F20.9 SCHIZOPHRENIA, UNSPECIFIED: Chronic | Status: ACTIVE | Noted: 2023-02-24

## 2023-09-27 PROBLEM — I10 ESSENTIAL (PRIMARY) HYPERTENSION: Chronic | Status: ACTIVE | Noted: 2023-02-24

## 2023-09-27 PROBLEM — E78.5 HYPERLIPIDEMIA, UNSPECIFIED: Chronic | Status: ACTIVE | Noted: 2023-02-24

## 2023-10-12 ENCOUNTER — APPOINTMENT (OUTPATIENT)
Age: 43
End: 2023-10-12
Payer: MEDICARE

## 2023-10-12 VITALS
WEIGHT: 315 LBS | OXYGEN SATURATION: 94 % | DIASTOLIC BLOOD PRESSURE: 98 MMHG | HEART RATE: 75 BPM | HEIGHT: 78 IN | SYSTOLIC BLOOD PRESSURE: 186 MMHG | BODY MASS INDEX: 36.45 KG/M2

## 2023-10-12 DIAGNOSIS — Z56.0 UNEMPLOYMENT, UNSPECIFIED: ICD-10-CM

## 2023-10-12 PROCEDURE — 99204 OFFICE O/P NEW MOD 45 MIN: CPT

## 2023-10-12 SDOH — ECONOMIC STABILITY - INCOME SECURITY: UNEMPLOYMENT, UNSPECIFIED: Z56.0

## 2023-10-16 LAB
25(OH)D3 SERPL-MCNC: 47.2 NG/ML
ALBUMIN SERPL ELPH-MCNC: 4.2 G/DL
ALP BLD-CCNC: 164 U/L
ALT SERPL-CCNC: 40 U/L
ANION GAP SERPL CALC-SCNC: 14 MMOL/L
APTT BLD: 36.3 SEC
AST SERPL-CCNC: 36 U/L
BASOPHILS # BLD AUTO: 0 K/UL
BASOPHILS NFR BLD AUTO: 0 %
BILIRUB SERPL-MCNC: 1 MG/DL
BUN SERPL-MCNC: 26 MG/DL
CALCIUM SERPL-MCNC: 11.1 MG/DL
CALCIUM SERPL-MCNC: 11.1 MG/DL
CHLORIDE SERPL-SCNC: 95 MMOL/L
CHOLEST SERPL-MCNC: 156 MG/DL
CO2 SERPL-SCNC: 25 MMOL/L
CREAT SERPL-MCNC: 6.36 MG/DL
EGFR: 10 ML/MIN/1.73M2
EOSINOPHIL # BLD AUTO: 0 K/UL
EOSINOPHIL NFR BLD AUTO: 0 %
ESTIMATED AVERAGE GLUCOSE: 85 MG/DL
FERRITIN SERPL-MCNC: 3907 NG/ML
FOLATE SERPL-MCNC: 5.8 NG/ML
GLUCOSE SERPL-MCNC: 89 MG/DL
HBA1C MFR BLD HPLC: 4.6 %
HCT VFR BLD CALC: 36.6 %
HDLC SERPL-MCNC: 43 MG/DL
HGB BLD-MCNC: 11.8 G/DL
IMM GRANULOCYTES NFR BLD AUTO: 0.2 %
INR PPP: 0.9 RATIO
IRON SATN MFR SERPL: 90 %
IRON SERPL-MCNC: 235 UG/DL
LDLC SERPL CALC-MCNC: 92 MG/DL
LYMPHOCYTES # BLD AUTO: 0.77 K/UL
LYMPHOCYTES NFR BLD AUTO: 12.7 %
MAN DIFF?: NORMAL
MCHC RBC-ENTMCNC: 32.2 GM/DL
MCHC RBC-ENTMCNC: 33.7 PG
MCV RBC AUTO: 104.6 FL
MONOCYTES # BLD AUTO: 0.78 K/UL
MONOCYTES NFR BLD AUTO: 12.9 %
NEUTROPHILS # BLD AUTO: 4.51 K/UL
NEUTROPHILS NFR BLD AUTO: 74.2 %
NONHDLC SERPL-MCNC: 113 MG/DL
PARATHYROID HORMONE INTACT: 158 PG/ML
PLATELET # BLD AUTO: 223 K/UL
POTASSIUM SERPL-SCNC: 4.6 MMOL/L
PROT SERPL-MCNC: 7.8 G/DL
PT BLD: 10.2 SEC
RBC # BLD: 3.5 M/UL
RBC # FLD: 15.5 %
SODIUM SERPL-SCNC: 135 MMOL/L
TIBC SERPL-MCNC: 260 UG/DL
TRIGL SERPL-MCNC: 115 MG/DL
TSH SERPL-ACNC: 2.16 UIU/ML
UIBC SERPL-MCNC: 25 UG/DL
VIT B12 SERPL-MCNC: 1274 PG/ML
WBC # FLD AUTO: 6.07 K/UL

## 2023-10-17 NOTE — PATIENT PROFILE ADULT - SAFE PLACE TO LIVE
"Tomeka Matute  [id number]      PROCEDURE:  Lumbar Transforaminal Epidural Steroid Injection under Fluoroscopic Guidance    LEVELS TREATED: L5/S1    SIDE: left    PREOPERATIVE DIAGNOSES:  Lumbosacral Radiculopathy, Discogenic Pain    POSTOPERATIVE DIAGNOSES:  Same    SURGEON: Julian Mills DO    ASSISTANTS: None    ASSISTANTS TASKS: None    ANESTHESIA:  Local Only    DESCRIPTION OF OPERATIVE PROCEDURE: The patient was examined, and we discussed the risk, benefits and alternatives of the procedure. An informed consent was obtained. An IV was not placed. The patient was brought to the procedure room and positioned prone on the table for the procedure. A time-out was conducted with all members of the care team present. Standard ASA monitors were placed. Standard prep (chloraprep) and drape were performed. Sterile technique was used throughout the procedure. With the patient prone, the appropriate level of the lumbar spine (L5 vertebral body) was identified above the sacrum in an A/P fluoroscopic view. Next, the L5 endplates were squared off using a cephalad tilt (5 degrees). The fluoroscope was then obliqued ipsilaterally to the intended foramen (15-20Â° left). At the site just inferior to the superior pedicle, a 27-gauge, 1.25"" hypodermic needle was used to anesthetize the skin and superficial tissue with 4 mL of 1% lidocaine. Next, a 22-gauge, 5\"" curved spinal needle was guided into the epidural space using a transforaminal approach under ipsilateral oblique, A/P and lateral fluoroscopic views. Once in the desired location, A/P and lateral fluoroscopic views were utilized to verify the needle position in the epidural space, but not past the 6-o'clock position on the pedicle in the A/P. The stylet was removed from the needle and after negative aspiration for blood/CSF, 0.25 ml of Isovue-200 contrast was injected under intermittent fluoroscopy.  Good contrast spread was noted in both the A/P and lateral projections along " the nerve root and medial to the pedicle in the epidural space, without evidence of intraneural, intrathecal, or intravascular injection. After a negative aspiration, a swirled mixture of 1 ml (10 mg) dexamethasone, 1 ml of 1% lidocaine, and 1 ml of sterile, preservative free normal saline was injected into the epidural space (total volume 3 ml). The needle was then removed while aspirating and gauze was used to apply pressure to the injection site. The procedure was not repeated on the opposite side. Following the procedure, the surgical site preparation was washed off, a Band-Aid was applied, and the patient was brought to the recovery area. The patient did very well, and the procedure results were discussed. Standard discharge instructions were given to the patient. The patient knows how to contact the clinic should they have any questions or concerns. Our clinic staff will call the patient for follow-up tomorrow. Preprocedural pain score was 5/10  Post procedural pain score was 1/69    COMPLICATIONS: None    EBL: Minimal    IMPLANTS: None    FLUIDS: None    Following the procedure, the patient was re-evaluated and was stable for discharge. Standard discharge instructions were provided to the patient. The patient knows how to contact the clinic should they have any questions or concerns. PLAN: Follow up in clinic in 3-4 weeks.     Nurys Diggs,   Interventional Pain Management no

## 2023-10-18 LAB — VIT B1 SERPL-MCNC: 135.3 NMOL/L

## 2023-10-18 NOTE — PRE-OP CHECKLIST - BMI (KG/M2)
45.1
48.8
Adbry Counseling: I discussed with the patient the risks of tralokinumab including but not limited to eye infection and irritation, cold sores, injection site reactions, worsening of asthma, allergic reactions and increased risk of parasitic infection.  Live vaccines should be avoided while taking tralokinumab. The patient understands that monitoring is required and they must alert us or the primary physician if symptoms of infection or other concerning signs are noted.

## 2023-11-13 ENCOUNTER — EMERGENCY (EMERGENCY)
Facility: HOSPITAL | Age: 43
LOS: 1 days | Discharge: HOME | End: 2023-11-13
Attending: EMERGENCY MEDICINE
Payer: MEDICARE

## 2023-11-13 VITALS
SYSTOLIC BLOOD PRESSURE: 184 MMHG | RESPIRATION RATE: 22 BRPM | WEIGHT: 315 LBS | HEIGHT: 78 IN | OXYGEN SATURATION: 99 % | HEART RATE: 78 BPM | TEMPERATURE: 98 F | DIASTOLIC BLOOD PRESSURE: 90 MMHG

## 2023-11-13 DIAGNOSIS — Z98.890 OTHER SPECIFIED POSTPROCEDURAL STATES: Chronic | ICD-10-CM

## 2023-11-13 LAB
ACETONE SERPL-MCNC: NEGATIVE — SIGNIFICANT CHANGE UP
ACETONE SERPL-MCNC: NEGATIVE — SIGNIFICANT CHANGE UP
ALBUMIN SERPL ELPH-MCNC: 3.1 G/DL — LOW (ref 3.5–5)
ALBUMIN SERPL ELPH-MCNC: 3.1 G/DL — LOW (ref 3.5–5)
ALP SERPL-CCNC: 130 U/L — HIGH (ref 40–120)
ALP SERPL-CCNC: 130 U/L — HIGH (ref 40–120)
ALT FLD-CCNC: 40 U/L DA — SIGNIFICANT CHANGE UP (ref 10–60)
ALT FLD-CCNC: 40 U/L DA — SIGNIFICANT CHANGE UP (ref 10–60)
ANION GAP SERPL CALC-SCNC: 6 MMOL/L — SIGNIFICANT CHANGE UP (ref 5–17)
ANION GAP SERPL CALC-SCNC: 6 MMOL/L — SIGNIFICANT CHANGE UP (ref 5–17)
AST SERPL-CCNC: 33 U/L — SIGNIFICANT CHANGE UP (ref 10–40)
AST SERPL-CCNC: 33 U/L — SIGNIFICANT CHANGE UP (ref 10–40)
BASOPHILS # BLD AUTO: 0 K/UL — SIGNIFICANT CHANGE UP (ref 0–0.2)
BASOPHILS # BLD AUTO: 0 K/UL — SIGNIFICANT CHANGE UP (ref 0–0.2)
BASOPHILS NFR BLD AUTO: 0 % — SIGNIFICANT CHANGE UP (ref 0–2)
BASOPHILS NFR BLD AUTO: 0 % — SIGNIFICANT CHANGE UP (ref 0–2)
BILIRUB SERPL-MCNC: 0.5 MG/DL — SIGNIFICANT CHANGE UP (ref 0.2–1.2)
BILIRUB SERPL-MCNC: 0.5 MG/DL — SIGNIFICANT CHANGE UP (ref 0.2–1.2)
BUN SERPL-MCNC: 33 MG/DL — HIGH (ref 7–18)
BUN SERPL-MCNC: 33 MG/DL — HIGH (ref 7–18)
CALCIUM SERPL-MCNC: 8.5 MG/DL — SIGNIFICANT CHANGE UP (ref 8.4–10.5)
CALCIUM SERPL-MCNC: 8.5 MG/DL — SIGNIFICANT CHANGE UP (ref 8.4–10.5)
CHLORIDE SERPL-SCNC: 99 MMOL/L — SIGNIFICANT CHANGE UP (ref 96–108)
CHLORIDE SERPL-SCNC: 99 MMOL/L — SIGNIFICANT CHANGE UP (ref 96–108)
CK SERPL-CCNC: 89 U/L — SIGNIFICANT CHANGE UP (ref 35–232)
CK SERPL-CCNC: 89 U/L — SIGNIFICANT CHANGE UP (ref 35–232)
CO2 SERPL-SCNC: 28 MMOL/L — SIGNIFICANT CHANGE UP (ref 22–31)
CO2 SERPL-SCNC: 28 MMOL/L — SIGNIFICANT CHANGE UP (ref 22–31)
CREAT SERPL-MCNC: 5.22 MG/DL — HIGH (ref 0.5–1.3)
CREAT SERPL-MCNC: 5.22 MG/DL — HIGH (ref 0.5–1.3)
EGFR: 13 ML/MIN/1.73M2 — LOW
EGFR: 13 ML/MIN/1.73M2 — LOW
EOSINOPHIL # BLD AUTO: 0 K/UL — SIGNIFICANT CHANGE UP (ref 0–0.5)
EOSINOPHIL # BLD AUTO: 0 K/UL — SIGNIFICANT CHANGE UP (ref 0–0.5)
EOSINOPHIL NFR BLD AUTO: 0 % — SIGNIFICANT CHANGE UP (ref 0–6)
EOSINOPHIL NFR BLD AUTO: 0 % — SIGNIFICANT CHANGE UP (ref 0–6)
GLUCOSE SERPL-MCNC: 123 MG/DL — HIGH (ref 70–99)
GLUCOSE SERPL-MCNC: 123 MG/DL — HIGH (ref 70–99)
HCT VFR BLD CALC: 32.7 % — LOW (ref 39–50)
HCT VFR BLD CALC: 32.7 % — LOW (ref 39–50)
HGB BLD-MCNC: 10.8 G/DL — LOW (ref 13–17)
HGB BLD-MCNC: 10.8 G/DL — LOW (ref 13–17)
IMM GRANULOCYTES NFR BLD AUTO: 0.3 % — SIGNIFICANT CHANGE UP (ref 0–0.9)
IMM GRANULOCYTES NFR BLD AUTO: 0.3 % — SIGNIFICANT CHANGE UP (ref 0–0.9)
LYMPHOCYTES # BLD AUTO: 0.55 K/UL — LOW (ref 1–3.3)
LYMPHOCYTES # BLD AUTO: 0.55 K/UL — LOW (ref 1–3.3)
LYMPHOCYTES # BLD AUTO: 8.5 % — LOW (ref 13–44)
LYMPHOCYTES # BLD AUTO: 8.5 % — LOW (ref 13–44)
MAGNESIUM SERPL-MCNC: 2.2 MG/DL — SIGNIFICANT CHANGE UP (ref 1.6–2.6)
MAGNESIUM SERPL-MCNC: 2.2 MG/DL — SIGNIFICANT CHANGE UP (ref 1.6–2.6)
MCHC RBC-ENTMCNC: 33 GM/DL — SIGNIFICANT CHANGE UP (ref 32–36)
MCHC RBC-ENTMCNC: 33 GM/DL — SIGNIFICANT CHANGE UP (ref 32–36)
MCHC RBC-ENTMCNC: 34.3 PG — HIGH (ref 27–34)
MCHC RBC-ENTMCNC: 34.3 PG — HIGH (ref 27–34)
MCV RBC AUTO: 103.8 FL — HIGH (ref 80–100)
MCV RBC AUTO: 103.8 FL — HIGH (ref 80–100)
MONOCYTES # BLD AUTO: 0.83 K/UL — SIGNIFICANT CHANGE UP (ref 0–0.9)
MONOCYTES # BLD AUTO: 0.83 K/UL — SIGNIFICANT CHANGE UP (ref 0–0.9)
MONOCYTES NFR BLD AUTO: 12.8 % — SIGNIFICANT CHANGE UP (ref 2–14)
MONOCYTES NFR BLD AUTO: 12.8 % — SIGNIFICANT CHANGE UP (ref 2–14)
NEUTROPHILS # BLD AUTO: 5.1 K/UL — SIGNIFICANT CHANGE UP (ref 1.8–7.4)
NEUTROPHILS # BLD AUTO: 5.1 K/UL — SIGNIFICANT CHANGE UP (ref 1.8–7.4)
NEUTROPHILS NFR BLD AUTO: 78.4 % — HIGH (ref 43–77)
NEUTROPHILS NFR BLD AUTO: 78.4 % — HIGH (ref 43–77)
NRBC # BLD: 0 /100 WBCS — SIGNIFICANT CHANGE UP (ref 0–0)
NRBC # BLD: 0 /100 WBCS — SIGNIFICANT CHANGE UP (ref 0–0)
OSMOLALITY SERPL: 290 MOSMOL/KG — SIGNIFICANT CHANGE UP (ref 275–300)
OSMOLALITY SERPL: 290 MOSMOL/KG — SIGNIFICANT CHANGE UP (ref 275–300)
PLATELET # BLD AUTO: 189 K/UL — SIGNIFICANT CHANGE UP (ref 150–400)
PLATELET # BLD AUTO: 189 K/UL — SIGNIFICANT CHANGE UP (ref 150–400)
POTASSIUM SERPL-MCNC: 4.8 MMOL/L — SIGNIFICANT CHANGE UP (ref 3.5–5.3)
POTASSIUM SERPL-MCNC: 4.8 MMOL/L — SIGNIFICANT CHANGE UP (ref 3.5–5.3)
POTASSIUM SERPL-SCNC: 4.8 MMOL/L — SIGNIFICANT CHANGE UP (ref 3.5–5.3)
POTASSIUM SERPL-SCNC: 4.8 MMOL/L — SIGNIFICANT CHANGE UP (ref 3.5–5.3)
PROT SERPL-MCNC: 8.2 G/DL — SIGNIFICANT CHANGE UP (ref 6–8.3)
PROT SERPL-MCNC: 8.2 G/DL — SIGNIFICANT CHANGE UP (ref 6–8.3)
RBC # BLD: 3.15 M/UL — LOW (ref 4.2–5.8)
RBC # BLD: 3.15 M/UL — LOW (ref 4.2–5.8)
RBC # FLD: 13.8 % — SIGNIFICANT CHANGE UP (ref 10.3–14.5)
RBC # FLD: 13.8 % — SIGNIFICANT CHANGE UP (ref 10.3–14.5)
SODIUM SERPL-SCNC: 133 MMOL/L — LOW (ref 135–145)
SODIUM SERPL-SCNC: 133 MMOL/L — LOW (ref 135–145)
TROPONIN I, HIGH SENSITIVITY RESULT: 63.2 NG/L — SIGNIFICANT CHANGE UP
TROPONIN I, HIGH SENSITIVITY RESULT: 63.2 NG/L — SIGNIFICANT CHANGE UP
WBC # BLD: 6.5 K/UL — SIGNIFICANT CHANGE UP (ref 3.8–10.5)
WBC # BLD: 6.5 K/UL — SIGNIFICANT CHANGE UP (ref 3.8–10.5)
WBC # FLD AUTO: 6.5 K/UL — SIGNIFICANT CHANGE UP (ref 3.8–10.5)
WBC # FLD AUTO: 6.5 K/UL — SIGNIFICANT CHANGE UP (ref 3.8–10.5)

## 2023-11-13 PROCEDURE — 71045 X-RAY EXAM CHEST 1 VIEW: CPT | Mod: 26

## 2023-11-13 PROCEDURE — 99285 EMERGENCY DEPT VISIT HI MDM: CPT

## 2023-11-13 PROCEDURE — 93010 ELECTROCARDIOGRAM REPORT: CPT

## 2023-11-13 RX ORDER — PANTOPRAZOLE SODIUM 20 MG/1
40 TABLET, DELAYED RELEASE ORAL ONCE
Refills: 0 | Status: COMPLETED | OUTPATIENT
Start: 2023-11-13 | End: 2023-11-13

## 2023-11-13 RX ORDER — SODIUM CHLORIDE 9 MG/ML
1000 INJECTION INTRAMUSCULAR; INTRAVENOUS; SUBCUTANEOUS
Refills: 0 | Status: DISCONTINUED | OUTPATIENT
Start: 2023-11-13 | End: 2023-11-17

## 2023-11-13 RX ORDER — ACETAMINOPHEN 500 MG
975 TABLET ORAL ONCE
Refills: 0 | Status: COMPLETED | OUTPATIENT
Start: 2023-11-13 | End: 2023-11-13

## 2023-11-13 RX ORDER — ONDANSETRON 8 MG/1
4 TABLET, FILM COATED ORAL ONCE
Refills: 0 | Status: COMPLETED | OUTPATIENT
Start: 2023-11-13 | End: 2023-11-13

## 2023-11-13 RX ADMIN — SODIUM CHLORIDE 125 MILLILITER(S): 9 INJECTION INTRAMUSCULAR; INTRAVENOUS; SUBCUTANEOUS at 19:48

## 2023-11-13 RX ADMIN — ONDANSETRON 4 MILLIGRAM(S): 8 TABLET, FILM COATED ORAL at 22:34

## 2023-11-13 RX ADMIN — PANTOPRAZOLE SODIUM 40 MILLIGRAM(S): 20 TABLET, DELAYED RELEASE ORAL at 22:34

## 2023-11-13 RX ADMIN — Medication 975 MILLIGRAM(S): at 23:22

## 2023-11-13 RX ADMIN — Medication 975 MILLIGRAM(S): at 22:52

## 2023-11-13 NOTE — PATIENT PROFILE ADULT - INFLUENZA IMMUNIZATION DATE (APPROXIMATE)
ELECTROCONVULSIVE THERAPY DISCHARGE INSTRUCTIONS         1. Activity - Rest today. The anesthetic agents you have received can make you feel drowsy or tired. A responsible person must drive you home and stay with you for 8 hours after discharge from the [address] [place] [address] not drive a motor vehicle or operate any machinery for 24 hours. .   *Do not make any complex decisions or execute any legal documents until 3 weeks AFTER your last treatment. If you have any questions regarding this, speak with your psychiatrist first.*    2. Diet - Nothing to eat or drink after midnight the night of your ECT treatment. After ECT, start with clear liquids, if you can drink without coughing, you may proceed with gradually progressing to your usual diet. No alcoholic beverages for 24 hours. 3. Medications - Take your daily medications as prescribed, after you return home from today's ECT. If you take a Beta Blocker or have been prescribed Imitrex, you may be instructed to take these medications the morning of ECT. If you are unsure please ask the physician. Take with these medication the morning of ECT with one Tablespoon of water. Tylenol 650 mg  All blood pressure medications  ***    4. You may experience the following after your treatment:   a. Poor memory   b. Poor balance   c. Headaches   d. Confusion   e. Muscle soreness   f. Nausea      5. Special Precautions - Contact you physician immediately if these occur:   a. Signs of infection: redness, swelling, heat, red streaks at the site of the IV   b. Excessive pain unrelieved by prescription pain medications   c. Nausea and vomiting that persists beyond the first day after your procedure    6. Next Procedure Date:   Your next ECT treatment is scheduled for 7:40 am on Wednesday, November 15, 2023. Please arrive at 6:10 am.    **You are also invited to join us at our monthly Electroconvulsive Therapy Peer Support Group.   This support group is held once a month, on the
23-Oct-2019

## 2023-11-13 NOTE — ED PROVIDER NOTE - NSICDXPASTMEDICALHX_GEN_ALL_CORE_FT
PAST MEDICAL HISTORY:  Anxiety     Asthma with COPD     Bipolar disorder     COPD (chronic obstructive pulmonary disease)     COPD (chronic obstructive pulmonary disease)     Diabetes mellitus, type 2     DM (diabetes mellitus)     ESRD on dialysis     ESRD on dialysis     HLD (hyperlipidemia)     HTN (hypertension)     HTN (hypertension)     Hypertension     Migraine     Morbid obesity with BMI of 40.0-44.9, adult     Renal failure     Schizophrenia     Schizophrenia

## 2023-11-13 NOTE — ED PROVIDER NOTE - CARE PLAN
Principal Discharge DX:	Nausea & vomiting  Secondary Diagnosis:	Abdominal pain  Secondary Diagnosis:	Cirrhosis   1

## 2023-11-13 NOTE — ED PROVIDER NOTE - NSFOLLOWUPINSTRUCTIONS_ED_ALL_ED_FT
Cirrhosis    Cirrhosis is long-term (chronic) liver injury. The liver is the body's largest internal organ, and it performs many functions. It converts food into energy, removes toxic material from the blood, makes important proteins, and absorbs necessary vitamins from food.    In cirrhosis, healthy liver cells are replaced by scar tissue. This prevents blood from flowing through the liver and makes it difficult for the liver to complete its functions.    What are the causes?  Common causes of this condition are hepatitis C and long-term alcohol abuse. Other causes include:  Nonalcoholic fatty liver disease (NAFLD). This happens when fat is deposited in the liver by causes other than alcohol.  Hepatitis B infection.  Autoimmune hepatitis. In this condition, the body's defense system (immune system) mistakenly attacks the liver cells, causing inflammation.  Diseases that cause blockage of ducts inside the liver.  Inherited liver diseases, such as hemochromatosis. This is one of the most common inherited liver diseases. In this disease, deposits of iron collect in the liver and other organs.  Reactions to certain long-term medicines, such as amiodarone, a heart medicine.  Parasitic infections. These include schistosomiasis, which is caused by a flatworm.  Long-term contact to certain toxins. These toxins include certain organic solvents, such as toluene and chloroform.  What increases the risk?  You are more likely to develop this condition if:  You have certain types of viral hepatitis.  You abuse alcohol, especially if you are female.  You are overweight.  You use IV drugs and share needles.  You have unprotected sex with someone who has viral hepatitis.  What are the signs or symptoms?  You may not have any signs and symptoms at first. Symptoms may not develop until the damage to your liver starts to get worse.    Early symptoms may include:  Weakness and tiredness (fatigue).  Changes in sleep patterns or having trouble sleeping.  Itchiness.  Tenderness in the right-upper part of your abdomen.  Weight loss and muscle loss.  Nausea.  Loss of appetite.  Later symptoms may include:  Fatigue or weakness that is getting worse.  Yellow skin and eyes (jaundice).  Buildup of fluid in the abdomen (ascites). You may notice that your clothes are tight around your waist.  Weight gain and swelling of the feet and ankles (edema).  Trouble breathing.  Easy bruising and bleeding.  Vomiting blood, or black or bloody stool.  Mental confusion.  How is this diagnosed?  Your health care provider may suspect cirrhosis based on your symptoms and medical history, especially if you have other medical conditions or a history of alcohol abuse. Your health care provider will do a physical exam to feel your liver and to check for signs of cirrhosis. Tests may include:  Blood tests to check:  For hepatitis B or C.  Kidney function.  Liver function.  Imaging tests such as:  MRI or CT scan to look for changes seen in advanced cirrhosis.  Ultrasound to see if normal liver tissue is being replaced by scar tissue.  A procedure in which a long needle is used to take a sample of liver tissue to be checked in a lab (biopsy). Liver biopsy can confirm the diagnosis of cirrhosis.  How is this treated?  Treatment for this condition depends on how damaged your liver is and what caused the damage. It may include treating the symptoms of cirrhosis, or treating the underlying causes to slow the damage. Treatment may include:  Making lifestyle changes, such as:  Eating a healthy diet. You may need to work with your health care provider or a dietitian to develop an eating plan.  Restricting salt intake.  Maintaining a healthy weight.  Not abusing drugs or alcohol.  Taking medicines to:  Treat liver infections or other infections.  Control itching.  Reduce fluid buildup.  Reduce certain blood toxins.  Reduce risk of bleeding from enlarged blood vessels in the stomach or esophagus (varices).  Liver transplant. In this procedure, a liver from a donor is used to replace your diseased liver. This is done if cirrhosis has caused liver failure.  Other treatments and procedures may be done depending on the problems that you get from cirrhosis. Common problems include liver-related kidney failure (hepatorenal syndrome).    Follow these instructions at home:    Take medicines only as told by your health care provider. Do not use medicines that are toxic to your liver. Ask your health care provider before taking any new medicines, including over-the-counter medicines such as NSAIDs.  Rest as needed.  Eat a well-balanced diet.  Limit your salt or water intake, if your health care provider asks you to do this.  Do not drink alcohol. This is especially important if you routinely take acetaminophen.  Keep all follow-up visits. This is important.  Contact a health care provider if you:  Have fatigue or weakness that is getting worse.  Develop swelling of the hands, feet, or legs, or a buildup of fluid in the abdomen (ascites).  Have a fever or chills.  Develop loss of appetite.  Have nausea or vomiting.  Develop jaundice.  Develop easy bruising or bleeding.  Get help right away if you:  Vomit bright red blood or a material that looks like coffee grounds.  Have blood in your stools.  Notice that your stools appear black and tarry.  Become confused.  Have chest pain or trouble breathing.  These symptoms may represent a serious problem that is an emergency. Do not wait to see if the symptoms will go away. Get medical help right away. Call your local emergency services (911 in the [place] Do not drive yourself to the hospital.    Summary  Cirrhosis is chronic liver injury. Common causes are hepatitis C and long-term alcohol abuse.  Tests used to diagnose cirrhosis include blood tests, imaging tests, and liver biopsy.  Treatment for this condition involves treating the underlying cause. Avoid alcohol, drugs, salt, and medicines that may damage your liver.  Get help right away if you vomit bright red blood or a material that looks like coffee grounds.  This information is not intended to replace advice given to you by your health care provider. Make sure you discuss any questions you have with your health care provider.        Vomiting, Adult  Vomiting is when stomach contents forcefully come out of the mouth. Many people notice nausea before vomiting. Vomiting can make you feel weak and cause you to become dehydrated.    Dehydration can make you feel tired and thirsty, cause you to have a dry mouth, and decrease how often you urinate. Older adults and people who have other diseases or a weak body defense system (immune system) are at higher risk for dehydration. It is important to treat vomiting as told by your health care provider.    Follow these instructions at home:  Washing hands with soap and water.  Watch your symptoms for any changes. Tell your health care provider about them.    Eating and drinking    Bananas next to a bowl of applesauce.  A bottle of clear fruit juice and glass of water.  Follow these recommendations as told by your health care provider:  Take an oral rehydration solution (ORS). This is a drink that is sold at pharmacies and retail stores.  Eat bland, easy-to-digest foods in small amounts as you are able. These foods include bananas, applesauce, rice, lean meats, toast, and crackers.  Drink clear fluids slowly and in small amounts as you are able. Clear fluids include water, ice chips, low-calorie sports drinks, and fruit juice that has water added (diluted fruit juice).  Avoid drinking fluids that contain a lot of sugar or caffeine, such as energy drinks, sports drinks, and soda.  Avoid alcohol.  Avoid spicy or fatty foods.  General instructions    Wash your hands often using soap and water for at least 20 seconds. If soap and water are not available, use hand .  Make sure that everyone in your household washes their hands frequently.  Take over-the-counter and prescription medicines only as told by your health care provider.  Rest at home while you recover.  Watch your condition for any changes.  Keep all follow-up visits. This is important.  Contact a health care provider if:  Your vomiting gets worse.  You have new symptoms.  You have a fever.  You cannot drink fluids without vomiting.  You feel light-headed or dizzy.  You have a headache.  You have muscle cramps.  You have a rash.  You have pain while urinating.  Get help right away if:  You have pain in your chest, neck, arm, or jaw.  Your heart is beating very quickly.  You have trouble breathing or you are breathing very quickly.  You feel extremely weak or you faint.  Your skin feels cold and clammy.  You feel confused.  You have persistent vomiting.  You have vomit that is bright red or looks like black coffee grounds.  You have stools (feces) that are bloody or black, or stools that look like tar.  You have a severe headache, a stiff neck, or both.  You have severe pain, cramping, or bloating in your abdomen.  You have signs of dehydration, such as:  Dark urine, very little urine, or no urine.  Cracked lips.  Dry mouth.  Sunken eyes.  Sleepiness.  Weakness.  These symptoms may be an emergency. Get help right away. Call 911.  Do not wait to see if the symptoms will go away.  Do not drive yourself to the hospital.  Summary  Vomiting is when stomach contents forcefully come out of the mouth. Vomiting can cause you to become dehydrated.  It is important to treat vomiting as told by your health care provider. Follow your health care provider's instructions about eating and drinking.  Wash your hands often using soap and water for at least 20 seconds. If soap and water are not available, use hand .  Watch your condition for any changes and for signs of dehydration.  Keep all follow-up visits. This is important.  This information is not intended to replace advice given to you by your health care provider. Make sure you discuss any questions you have with your health care provider.      you must follow up with your medical doctor, also gastroenterology  you must be dialyzed within 24 hours since you had a cat scan done with IV contrast  Call Dr. La nephrologist in the morning for dialysis      Gastritis, Adult  Outline of an adult's lower body with a close-up of the stomach, showing inflammation and an ulcer inside the stomach.   Gastritis is inflammation of the stomach. There are two kinds of gastritis:  Acute gastritis. This kind develops suddenly.  Chronic gastritis. This kind is much more common. It develops slowly and lasts for a long time.  Gastritis happens when the lining of the stomach becomes weak or gets damaged. Without treatment, gastritis can lead to stomach bleeding and ulcers.    What are the causes?  This condition may be caused by:  An infection.  Drinking too much alcohol.  Certain medicines. These include steroids, antibiotics, and some over-the-counter medicines, such as aspirin or ibuprofen.  Having too much acid in the stomach.  Having a disease of the stomach.  Other causes may include:  An allergic reaction.  Some cancer treatments (radiation).  Smoking cigarettes or the use of products that contain nicotine or tobacco.  In some cases, the cause of this condition is not known.    What increases the risk?  Having a disease of the intestines.  Having a disease in which the body's immune system attacks the body (autoimmune disease), such as Crohn's disease.  Using aspirin or ibuprofen and other NSAIDs to treat other conditions, such as heart disease or chronic pain.  Stress.  What are the signs or symptoms?  Symptoms of this condition include:  Pain or a burning sensation in the upper abdomen.  Nausea.  Vomiting.  An uncomfortable feeling of fullness after eating.  Weight loss.  Bad breath.  Blood in your vomit or stool (feces).  In some cases, there are no symptoms.    How is this diagnosed?  This condition may be diagnosed based on your medical history, a physical exam, and tests. Tests may include:  Your medical history and a description of your symptoms.  A physical exam.  Tests. These can include:  Blood tests.  Stool tests.  A test in which a thin, flexible instrument with a light and a camera is passed down the esophagus and into the stomach (upper endoscopy).  A test in which a tissue sample is removed to look at it under a microscope (biopsy).  How is this treated?  This condition may be treated with medicines. The medicines that are used vary depending on the cause of the gastritis.  If the condition is caused by a bacterial infection, you may be given antibiotic medicines.  If the condition is caused by too much acid in the stomach, you may be given medicines called H2 blockers, proton pump inhibitors, or antacids.  Treatment may also involve stopping the use of certain medicines such as aspirin or ibuprofen and other NSAIDs.    Follow these instructions at home:  Medicines    Take over-the-counter and prescription medicines only as told by your health care provider.  If you were prescribed an antibiotic medicine, take it as told by your health care provider. Do not stop taking the antibiotic even if you start to feel better.  Alcohol use    Do not drink alcohol if:  Your health care provider tells you not to drink.  You are pregnant, may be pregnant, or are planning to become pregnant.  If you drink alcohol:  Limit your use to:  0–1 drink a day for women.  0–2 drinks a day for men.  Know how much alcohol is in your drink. In the [place] one drink equals one 12 oz bottle of beer (355 mL), one 5 oz glass of wine (148 mL), or one 1½ oz glass of hard liquor (44 mL).  General instructions    A comparison of three sample cups showing dark yellow, yellow, and pale yellow urine.  Eat small, frequent meals instead of large meals.  Avoid foods and drinks that make your symptoms worse.  Talk with your health care provider about ways to manage stress, such as getting regular exercise or practicing deep breathing, meditation, or yoga.  Do not use any products that contain nicotine or tobacco. These products include cigarettes, chewing tobacco, and vaping devices, such as e-cigarettes. If you need help quitting, ask your health care provider.  Drink enough fluid to keep your urine pale yellow.  Keep all follow-up visits. This is important.  Contact a health care provider if:  Your symptoms get worse.  Your abdominal pain gets worse.  Your symptoms return after treatment.  You have a fever.  Get help right away if:  You vomit blood or a substance that looks like coffee grounds.  You have black or dark red stools.  You are unable to keep fluids down.  These symptoms may represent a serious problem that is an emergency. Do not wait to see if the symptoms will go away. Get medical help right away. Call your local emergency services (911 in the [place] Do not drive yourself to the hospital.    Summary  Gastritis is inflammation of the lining of the stomach that can occur suddenly (acute) or develop slowly over time (chronic).  This condition is diagnosed with a medical history, a physical exam, or tests.  This condition may be treated with medicines to treat infection or medicines to reduce the amount of acid in your stomach.  Follow your health care provider's instructions about taking medicines, making changes to your diet, and knowing when to call for help.  This information is not intended to replace advice given to you by your health care provider. Make sure you discuss any questions you have with your health care provider.

## 2023-11-13 NOTE — ED PROVIDER NOTE - MUSCULOSKELETAL, MLM
Spine appears normal, range of motion is not limited, no muscle or joint tenderness, no CVAT, Rt distal forearm-AVG with thrills

## 2023-11-13 NOTE — ED PROVIDER NOTE - PROGRESS NOTE DETAILS
Labs result explained to patient  CTA/P pending CT result explained to pt  Pt with hepatosplenomegaly, poss cirrhosis.  Pt denies ETOH abuse.    Pt claims he is feeling much better, feeling hungry, will feed pt.  Advised to f/u with GI  also advised to get HD within 24 hours Labs result explained to patient  CT A/P pending CT result explained to pt  Pt with hepatosplenomegaly, poss cirrhosis.  Pt denies ETOH abuse.    Pt claims he is feeling much better, feeling hungry, will feed pt.  Advised to f/u with GI  also advised to get HD within 24 hours  pt states he is able to go home with no oxygen Pt ate & tolerated well

## 2023-11-13 NOTE — ED PROVIDER NOTE - OBJECTIVE STATEMENT
Admitted 43-year-old male with history of HTN, bipolar, COPD on home O2 4 L, end-stage renal disease on dialysis last HD today.  Patient complaining of having no appetite since Friday a.m., unable to tolerate any p.o. intake, on and off epigastric pain radiates to his left lower quadrant after vomiting since Saturday.  Last BM yesterday, black stool for past few days.  Patient denies taking Pepto-Bismol.  Patient admits to taking 2 tabs of Advil on Thursday for back pain

## 2023-11-13 NOTE — ED PROVIDER NOTE - CLINICAL SUMMARY MEDICAL DECISION MAKING FREE TEXT BOX
Eating feel well your sugar told Dr. Burch patient with history of end-stage renal disease complaining of vomiting unable to tolerate any p.o. intake.  Patient later complaining of epigastric/left lower quadrant pain.  Concern for colitis, diverticulitis, gastritis.  Will get labs, give 500 cc IV fluid, CT and reassess

## 2023-11-13 NOTE — ED PROVIDER NOTE - SEVERITY
PAIN SCALE 8 OF 10. Cibinqo Pregnancy And Lactation Text: It is unknown if this medication will adversely affect pregnancy or breast feeding.  You should not take this medication if you are currently pregnant or planning a pregnancy or while breastfeeding.

## 2023-11-13 NOTE — ED PROVIDER NOTE - WR INTERPRETED BY 1
Problem: Suicidal Behavior  Goal: Suicidal Behavior is Absent or Managed  3/23/2022 2201 by Estephania Herron RN  Outcome: Ongoing, Progressing  Flowsheets (Taken 3/23/2022 2201)  Mutually Determined Action Steps (Suicidal Behavior Absent/Managed):   identifies protective factors   shares suicidal thoughts   identifies crisis plan   verbalizes safety check rationale  3/23/2022 2159 by Estephania Herron RN  Outcome: Ongoing, Progressing     Problem: Behavioral Health Plan of Care  Goal: Plan of Care Review  3/23/2022 2201 by Estephania Herron RN  Outcome: Ongoing, Progressing  3/23/2022 2159 by Estephania Herron RN  Outcome: Ongoing, Progressing  Flowsheets  Taken 3/23/2022 2159  Plan of Care Reviewed With: patient  Overall Patient Progress: improving  Patient Agreement with Plan of Care: agrees  Taken 3/23/2022 1900  Plan of Care Reviewed With: patient  Goal: Patient-Specific Goal (Individualization)  Outcome: Ongoing, Progressing  Goal: Adheres to Safety Considerations for Self and Others  Outcome: Ongoing, Progressing  Intervention: Develop and Maintain Individualized Safety Plan  Recent Flowsheet Documentation  Taken 3/23/2022 1900 by Estephania Herron RN  Safety Measures: safety rounds completed  Goal: Absence of New-Onset Illness or Injury  Outcome: Ongoing, Progressing  Intervention: Identify and Manage Fall Risk  Recent Flowsheet Documentation  Taken 3/23/2022 1900 by Estephania Herron RN  Safety Measures: safety rounds completed  Goal: Optimal Comfort and Wellbeing  Outcome: Ongoing, Progressing  Goal: Optimized Coping Skills in Response to Life Stressors  Outcome: Ongoing, Progressing  Goal: Develops/Participates in Therapeutic [place] to Support Successful Transition  Outcome: Ongoing, Progressing   Goal Outcome Evaluation:    Plan of Care Reviewed With: patient     Overall Patient Progress: improving            Tamar Cheney

## 2023-11-13 NOTE — ED ADULT NURSE NOTE - CAS EDN DISCHARGE ASSESSMENT
Patient states he is okay to go home without oxygen. MD Cheney is aware./Alert and oriented to person, place and time

## 2023-11-13 NOTE — ED PROVIDER NOTE - PATIENT PORTAL LINK FT
You can access the FollowMyHealth Patient Portal offered by [facility] by registering at the following website: [email or web address]. By joining [employer]’s FollowMyHealth portal, you will also be able to view your health information using other applications (apps) compatible with our system.

## 2023-11-13 NOTE — ED ADULT NURSE NOTE - OBJECTIVE STATEMENT
Pt presents to the ED complaining of difficulty breathing, decreased appetite and vomiting. PT states he has not had anything to eat or drink since Thursday and has been throwing up. Pt is on 4L [place] home O2 end statge renal disease on dialysis. Right forearm access. Pt in NAD, O2 100 on $L [place]. Safety maintained.

## 2023-11-14 ENCOUNTER — EMERGENCY (EMERGENCY)
Facility: HOSPITAL | Age: 43
LOS: 1 days | Discharge: HOME | End: 2023-11-14
Attending: EMERGENCY MEDICINE
Payer: MEDICARE

## 2023-11-14 VITALS
OXYGEN SATURATION: 93 % | HEART RATE: 78 BPM | WEIGHT: 315 LBS | HEIGHT: 78 IN | DIASTOLIC BLOOD PRESSURE: 67 MMHG | SYSTOLIC BLOOD PRESSURE: 138 MMHG | RESPIRATION RATE: 19 BRPM | TEMPERATURE: 98 F

## 2023-11-14 VITALS
SYSTOLIC BLOOD PRESSURE: 163 MMHG | DIASTOLIC BLOOD PRESSURE: 74 MMHG | TEMPERATURE: 98 F | HEART RATE: 78 BPM | OXYGEN SATURATION: 97 % | RESPIRATION RATE: 18 BRPM

## 2023-11-14 VITALS
RESPIRATION RATE: 18 BRPM | OXYGEN SATURATION: 99 % | SYSTOLIC BLOOD PRESSURE: 175 MMHG | DIASTOLIC BLOOD PRESSURE: 83 MMHG | HEART RATE: 72 BPM | TEMPERATURE: 98 F

## 2023-11-14 DIAGNOSIS — Z98.890 OTHER SPECIFIED POSTPROCEDURAL STATES: Chronic | ICD-10-CM

## 2023-11-14 PROCEDURE — 36415 COLL VENOUS BLD VENIPUNCTURE: CPT

## 2023-11-14 PROCEDURE — 82009 KETONE BODYS QUAL: CPT

## 2023-11-14 PROCEDURE — 83930 ASSAY OF BLOOD OSMOLALITY: CPT

## 2023-11-14 PROCEDURE — 99282 EMERGENCY DEPT VISIT SF MDM: CPT

## 2023-11-14 PROCEDURE — 96375 TX/PRO/DX INJ NEW DRUG ADDON: CPT

## 2023-11-14 PROCEDURE — 85025 COMPLETE CBC W/AUTO DIFF WBC: CPT

## 2023-11-14 PROCEDURE — 82550 ASSAY OF CK (CPK): CPT

## 2023-11-14 PROCEDURE — 80053 COMPREHEN METABOLIC PANEL: CPT

## 2023-11-14 PROCEDURE — 99283 EMERGENCY DEPT VISIT LOW MDM: CPT

## 2023-11-14 PROCEDURE — 71045 X-RAY EXAM CHEST 1 VIEW: CPT

## 2023-11-14 PROCEDURE — 84484 ASSAY OF TROPONIN QUANT: CPT

## 2023-11-14 PROCEDURE — 74177 CT ABD & PELVIS W/CONTRAST: CPT | Mod: MA

## 2023-11-14 PROCEDURE — 99284 EMERGENCY DEPT VISIT MOD MDM: CPT | Mod: 25

## 2023-11-14 PROCEDURE — 93005 ELECTROCARDIOGRAM TRACING: CPT

## 2023-11-14 PROCEDURE — 96374 THER/PROPH/DIAG INJ IV PUSH: CPT

## 2023-11-14 PROCEDURE — 74177 CT ABD & PELVIS W/CONTRAST: CPT | Mod: 26,MA

## 2023-11-14 PROCEDURE — 83735 ASSAY OF MAGNESIUM: CPT

## 2023-11-14 RX ORDER — ONDANSETRON 8 MG/1
1 TABLET, FILM COATED ORAL
Qty: 21 | Refills: 0
Start: 2023-11-14 | End: 2023-11-20

## 2023-11-14 RX ORDER — FAMOTIDINE 10 MG/ML
1 INJECTION INTRAVENOUS
Qty: 60 | Refills: 0
Start: 2023-11-14 | End: 2023-12-13

## 2023-11-14 NOTE — ED PROVIDER NOTE - NSFOLLOWUPINSTRUCTIONS_ED_ALL_ED_FT
Please attend your normal dialysis session tomorrow as discussed.    End Stage Kidney Disease    WHAT YOU NEED TO KNOW:    End-stage kidney disease (ESRD) is when your kidney function is so poor that you need dialysis treatments or a kidney transplant to survive. ESRD usually occurs after long-term kidney disease.    DISCHARGE INSTRUCTIONS:    Seek care immediately if:    You have shortness of breath or chest pain.    You have a rash, or a new wound that is very painful.    You have severe muscle cramps or pain.    Your heart is beating faster than normal for you.  Call your doctor or nephrologist if:    You urinate less than is normal for you.    You gain or lose more weight than your healthcare provider told you is okay.    You are more tired or drowsy.    You have increased nausea or vomiting.    You have pain that does not decrease, even after you take medicine.    You have questions or concerns about your condition or care.  Medicines:    Medicines are given to decrease blood pressure, pain, or itching. You may also need medicine to decrease nausea, or to treat or prevent anemia (low number of red blood cells).    Take your medicine as directed. Contact your healthcare provider if you think your medicine is not helping or if you have side effects. Tell your provider if you are allergic to any medicine. Keep a list of the medicines, vitamins, and herbs you take. Include the amounts, and when and why you take them. Bring the list or the pill bottles to follow-up visits. Carry your medicine list with you in case of an emergency.  Manage ESRD:    Protect your dialysis access site. Do not let anyone take blood or blood pressure readings on the arm where you have your arteriovenous fistula or graft. Cover your peritoneal catheter with a bandage. Do not touch the catheter.    Limit fluids to 1 liter a day (about 34 ounces), or as directed by your healthcare provider. This can help you manage swelling between dialysis appointments.    Weigh yourself at the same time every day. Use the same scale, and wear the same amount of clothing. Record your weight and bring it with you to follow-up appointments.    Do not use NSAIDs or aspirin. They can increase the risk of bleeding in your stomach.  Self-care:    Manage other health conditions, such as high blood pressure, diabetes, and heart disease. These conditions can make your ESRD worse.    Eat foods low in sodium, phosphorus, and potassium as directed. You may also need to eat foods high in protein. You may need to see a dietitian if you need help planning meals.    Maintain a healthy weight. Ask your healthcare provider how much you should weigh. Ask him to help you create a weight loss plan if you are overweight.    Exercise as directed. Regular exercise can help you manage conditions that occur with ESRD, such as high blood pressure and diabetes. Exercise may give you more energy and decrease constipation. Ask about the best exercise plan for you.    Limit alcohol. Ask how much alcohol is safe for you to drink. A drink of alcohol is 12 ounces of beer, 5 ounces of wine, or 1½ ounces of liquor.    Do not smoke. Nicotine and other chemicals in cigarettes and cigars can cause lung and kidney damage. Ask your healthcare provider for information if you currently smoke and need help to quit. E-cigarettes or smokeless tobacco still contain nicotine. Talk to your healthcare provider before you use these products.    Ask your healthcare provider if you need vaccines. Pneumonia, influenza, and hepatitis can be more harmful or more likely to occur when you have ESRD. Vaccines reduce your risk of infection with these viruses.  Follow up with your doctor or nephrologist as directed: You will need to return for more tests. You may also need to return for routine dialysis treatments. Write down your questions so you remember to ask them during your visits.

## 2023-11-14 NOTE — ED PROVIDER NOTE - CLINICAL SUMMARY MEDICAL DECISION MAKING FREE TEXT BOX
43-year-old male dialysis patient here for possible dialysis.  Patient normally due tomorrow but received contrast study of abdomen pelvis last night.  Contacted  patient nephrologist who states that given timing of study patient can wait till his normal appointment tomorrow.  Patient agrees to plan.

## 2023-11-14 NOTE — ED ADULT NURSE NOTE - NSFALLUNIVINTERV_ED_ALL_ED
Bed/Stretcher in lowest position, wheels locked, appropriate side rails in place/Call bell, personal items and telephone in reach/Instruct patient to call for assistance before getting out of bed/chair/stretcher/Non-slip footwear applied when patient is off stretcher/[place] to call system/Physically safe environment - no spills, clutter or unnecessary equipment/Purposeful proactive rounding/Room/bathroom lighting operational, light cord in reach

## 2023-11-14 NOTE — ED PROVIDER NOTE - OBJECTIVE STATEMENT
43-year-old male with history of HTN, bipolar, COPD on home O2 4 L, end-stage renal disease on dialysis last HD yesterday.  Presenting to ED for possible dialysis.  States that he received CAT scan report containing contrast to evaluate his abdominal pain yesterday.  Was discharged earlier this morning and was unable to obtain an appointment for dialysis today.  Was told that he would need dialysis today due to recent contrast bolus.  Dialysis center told him to go to ER for possible dialysis.  Otherwise feels at baseline.  Denies any chest pain or worsening shortness of breath.  Denies any fever or abdominal pain currently.

## 2023-11-14 NOTE — ED PROVIDER NOTE - PATIENT PORTAL LINK FT
You can access the FollowMyHealth Patient Portal offered by [facility] by registering at the following website: [email or web address]. By joining [employer]’s FollowMyHealth portal, you will also be able to view your health information using other applications (apps) compatible with our system.

## 2023-11-14 NOTE — ED ADULT NURSE NOTE - OBJECTIVE STATEMENT
Pt presents to ED for Dialysis. Reports last Dialysis yesterday 11/13/23. Pt attends Dialysis Monday, Wednesday and Friday. AV fistula to right arm.

## 2023-11-14 NOTE — ED ADULT NURSE NOTE - ISOLATION TYPE:
What Is The Reason For Today's Visit?: Full Body Skin Examination What Is The Reason For Today's Visit? (Being Monitored For X): concerning skin lesions on a periodic basis None

## 2023-11-15 NOTE — ED PROVIDER NOTE - HISTORY ATTESTATION, MLM
Pt informed of all results. PT expressed understanding. Lab results forwarded to [facility] urology  '   I have reviewed and confirmed nurses' notes...

## 2023-11-16 ENCOUNTER — APPOINTMENT (OUTPATIENT)
Dept: SURGERY | Facility: CLINIC | Age: 43
End: 2023-11-16
Payer: MEDICARE

## 2023-11-16 VITALS
WEIGHT: 315 LBS | HEART RATE: 77 BPM | HEIGHT: 78 IN | OXYGEN SATURATION: 95 % | BODY MASS INDEX: 36.45 KG/M2 | SYSTOLIC BLOOD PRESSURE: 157 MMHG | DIASTOLIC BLOOD PRESSURE: 111 MMHG

## 2023-11-16 VITALS — DIASTOLIC BLOOD PRESSURE: 90 MMHG | SYSTOLIC BLOOD PRESSURE: 162 MMHG

## 2023-11-16 PROCEDURE — 99213 OFFICE O/P EST LOW 20 MIN: CPT

## 2023-11-21 NOTE — ED ADULT NURSE NOTE - NSFALLRSKASSESSTYPE_ED_ALL_ED
"  Subjective     Chief Complaint: "follow-up"    History of Present Illness:  Ms. Batool Rivera is a 61 y.o. female with hx of HTN, HLD, glaucoma, chronic back pain, depression, migraines, who presents for a follow-up visit. I last saw Ms. Rivera on 9/8/23 when she came in to clinic for a BP check. Dr. Trammell is her PCP. At that time, patient had recently been started on lisinopril-HCTZ for her BP, and lexapro for anxiety. I doubled patient's lisinopril-HCTZ dose for continued hypertension. I also referred her to general surgery for evaluation of a cyst on her back. Patient was subsequently seen by general surgery who recommended follow-up for surgical excision of the cyst.     Today, patient reports she is doing ok. She reports some worsening depression over the past few weeks. She is not taking her lexapro daily; she is taking it prn. She says she feels a lot better when she is taking her Lexapro, but has not been taking it every day. She also forgot to take her BP medication today. She checks her BP at home every day but forgot to bring the log today. She states her pressures are "up and down" at home but can't give me specific numbers. She also has a headache today that she attributes to not eating or drinking any water today. She is interested in weight loss options to help with her obesity. She reports trying to improve her diet; she is cutting down on salty foods.     Review of Systems   Constitutional:  Negative for chills and fever.   HENT:  Negative for congestion and sore throat.    Respiratory:  Negative for cough and shortness of breath.    Cardiovascular:  Negative for chest pain and leg swelling.   Gastrointestinal:  Negative for abdominal pain, constipation, diarrhea, nausea and vomiting.   Musculoskeletal:  Negative for joint pain and myalgias.   Neurological:  Positive for headaches. Negative for dizziness and loss of consciousness.   Psychiatric/Behavioral:  Positive for depression.        PAST " HISTORY:     Past Medical History:   Diagnosis Date    Anemia     Cataract     Chronic back pain     Degenerative disc disease     Depression     Glaucoma suspect with open angle     Heart disease, unspecified     Hyperlipidemia     Hypertension        Past Surgical History:   Procedure Laterality Date     SECTION      COLONOSCOPY N/A 2023    Procedure: COLONOSCOPY;  Surgeon: Lon George MD;  Location: [facility] ([address] [place] [address]);  Service: Colon and Rectal;  Laterality: N/A;  referred by pcp arash Cordova instructions sent to pt portal.cf  pre call, pt confirmed - mf    HYSTERECTOMY   or     OOPHORECTOMY      one ovary was removed    TUBAL LIGATION         Family History   Problem Relation Age of Onset    Hypertension Mother     Heart disease Mother     Asthma Mother     Cataracts Mother     Glaucoma Mother     Brain cancer Father     Stroke Brother     Kidney disease Brother     Glaucoma Sister     Diabetes Sister     Heart disease Brother         MI x 5    Depression Brother     Blindness Neg Hx     Macular degeneration Neg Hx     Retinal detachment Neg Hx     Breast cancer Neg Hx     Colon cancer Neg Hx     Ovarian cancer Neg Hx        Social History     Socioeconomic History    Marital status: Single   Tobacco Use    Smoking status: Former     Current packs/day: 0.00     Average packs/day: 2.0 packs/day for 28.0 years (56.0 ttl pk-yrs)     Types: Cigarettes     Start date: 1983     Quit date: 2011     Years since quittin.2    Smokeless tobacco: Never   Substance and Sexual Activity    Alcohol use: Yes     Comment: beer weekends    Drug use: No    Sexual activity: Yes     Partners: Male     Birth control/protection: See Surgical Hx     Comment: BTL   Social History Narrative    Living with her felicitas, who is 9 y/o.        MEDICATIONS & ALLERGIES:     Current Outpatient Medications on File Prior to Visit   Medication Sig    amLODIPine (NORVASC) 10 MG tablet TAKE 1  "TABLET(10 MG) BY MOUTH EVERY EVENING FOR BLOOD PRESSURE    aspirin (ECOTRIN) 81 MG EC tablet Take 1 tablet (81 mg total) by mouth once daily.    atorvastatin (LIPITOR) 80 MG tablet TAKE 1 TABLET BY MOUTH ONCE DAILY FOR CHOLESTEROL AND TO PROTECT YOUR HEART    ergocalciferol (ERGOCALCIFEROL) 50,000 unit Cap TAKE ONE CAPSULE BY MOUTH EVERY 7 DAYS FOR VITAMIN D DEFICIENCY    EScitalopram oxalate (LEXAPRO) 20 MG tablet Take 1 tablet (20 mg total) by mouth once daily.    ezetimibe (ZETIA) 10 mg tablet Take 1 tablet (10 mg total) by mouth every evening.    fluticasone propionate (FLONASE) 50 mcg/actuation nasal spray 1 spray (50 mcg total) by Each Nostril route once daily.    gabapentin (NEURONTIN) 400 MG capsule Take 1 capsule (400 mg total) by mouth every evening. If no relief, may increase dose to twice per day.    lisinopriL-hydrochlorothiazide (PRINZIDE,ZESTORETIC) 20-12.5 mg per tablet Take 2 tablets by mouth once daily.    traZODone (DESYREL) 150 MG tablet TAKE 1 TABLET(150 MG) BY MOUTH EVERY EVENING AT BEDTIME AS NEEDED FOR INSOMNIA    [DISCONTINUED] lisinopriL (PRINIVIL,ZESTRIL) 20 MG tablet Take 1 tablet (20 mg total) by mouth once daily.     No current facility-administered medications on file prior to visit.       Review of patient's allergies indicates:   Allergen Reactions    Coconut Itching    Losartan Hallucinations    Voltaren [diclofenac sodium] Rash     Rash with voltaren GEL, not to NSAIDs in general       OBJECTIVE:     Vital Signs:  Vitals:    11/21/23 0958   BP: (!) 142/96   BP Location: Right arm   Patient Position: Sitting   BP Method: Large (Automatic)   Pulse: (!) 55   SpO2: 95%   Weight: 99.6 kg (219 lb 9.3 oz)   Height: 5' 5" (1.651 m)       Body mass index is 36.54 kg/m².     Physical Exam  Vitals and nursing note reviewed.   Constitutional:       General: She is not in acute distress.     Appearance: Normal appearance. She is obese. She is not ill-appearing, toxic-appearing or diaphoretic. "   HENT:      Head: Normocephalic and atraumatic.      Nose: Nose normal. No congestion.      Mouth/Throat:      Mouth: Mucous membranes are moist.      Pharynx: Oropharynx is clear.   Eyes:      Extraocular Movements: Extraocular movements intact.      Conjunctiva/sclera: Conjunctivae normal.      Pupils: Pupils are equal, round, and reactive to light.   Cardiovascular:      Rate and Rhythm: Normal rate and regular rhythm.      Pulses: Normal pulses.      Heart sounds: Murmur (II/VI early systolic murmur noted) heard.      No friction rub. No gallop.   Pulmonary:      Effort: Pulmonary effort is normal. No respiratory distress.      Breath sounds: Normal breath sounds.   Abdominal:      General: Abdomen is flat. Bowel sounds are normal. There is no distension.      Palpations: Abdomen is soft.      Tenderness: There is no abdominal tenderness.   Musculoskeletal:         General: No swelling. Normal range of motion.      Cervical back: Normal range of motion and neck supple.   Skin:     General: Skin is warm and dry.   Neurological:      General: No focal deficit present.      Mental Status: She is alert and oriented to person, place, and time.   Psychiatric:         Mood and Affect: Mood normal.         Behavior: Behavior normal.         Laboratory  No results found for this or any previous visit (from the past 24 hour(s)).    Diagnostic Results:      Health Maintenance Due   Topic Date Due    Pneumococcal Vaccines (Age 0-64) (1 - PCV) Never done    LDCT Lung Screen  11/05/2022    RSV Vaccine (Age 60+ and Pregnant patients) (1 - 1-dose 60+ series) Never done    Influenza Vaccine (1) 09/01/2023    COVID-19 Vaccine (4 - 2023-24 season) 09/01/2023    Mammogram  02/16/2024         ASSESSMENT & PLAN:     Ms. Batool Rivera is a 61 y.o. female who presents for a follow-up visit.     1. Primary hypertension  - Patient hypertensive in clinic this am due to not taking her BP medication today. I advised patient should  continue taking her medications daily; she understandably forgot as she was running out of the house to catch the bus today. She would also be a good candidate for the digital HTN program. I discussed this with the patient and she is amenable. Will keep her on the same lisinopril-HCTZ dose at this time.   -     Hypertension Digital Medicine (HDMP) Enrollment Order  -     Hypertension Digital Medicine (HDMP): Assign Onboarding Questionnaires    2. Mixed hyperlipidemia  - Will recheck lipid panel at our next visit  -     Lipids Digital Medicine (LDMP) Enrollment Order  -     Lipids Digital Medicine (LDMP): Assign Onboarding Questionnaires    3. Healthcare maintenance  - Patient states she will get her flu shot at the pharmacy  -     CT Chest Lung Screening Low Dose; Future; Expected date: 11/21/2023    4. Severe obesity (BMI 35.0-35.9 with comorbidity)  - Encouraged continued healthy eating habits  -     Ambulatory referral/consult to Weight Management Program; Future; Expected date: 11/28/2023    5. Depression        -      Discussed with patient the importance of taking the lexapro daily.     Advised this is not a prn medication; you must take the medication daily for several weeks for the full effects. Patient reports understanding. Will closely monitor her sxs, if not improved I will increase her lexapro dose.       RTC in 2 months for annual physical    Discussed with Dr. Orta  - staff attestation to follow      Umesh Walton MD  Internal Medicine PGY-3  [facility]  [address] [place] [id number]  [phone]       Initial (On Arrival)

## 2023-12-04 NOTE — DISCHARGE NOTE PROVIDER - NSDCCONDITION_GEN_ALL_CORE
[place] ambulatory encounter  URGENT CARE OFFICE VISIT      ASSESSMENT AND PLAN    Other chronic back pain  - MRI THORACIC SPINE WO CONTRAST; Future  - MRI LUMBAR SPINE WO CONTRAST; Future      -chronic low back pain.  Patient is asking  something that is not opioids.  He tried muscle relaxant and steroids already but no relief.  Can not take NSAIDs as he has history of ulcer.  Upcoming appointment with pain management in the next few days.  -supportive treatment at the moment.  Follow-up with pain management.              All questions were answered accordingly explained to the patient.    The patient was advised to follow up with primary physician or to recheck with the urgent care clinic sooner if symptoms get worse or if new symptoms appear.    Please understand this is a provisional diagnosis that can and may change. The diagnosis that you are discharged with is based on the symptoms you described and the diagnostic information available today. ED precautions discussed with patient    PREVIOUS LABS    Last Lab A1C:  Hemoglobin A1C (%)   Date Value   03/13/2023 5.2       Last Point of Care A1C:  No results found for: \"[id number]\"  Body mass index is 50.4 kg/m².  Glomerular Filtration Rate (no units)   Date Value   03/13/2023 >90       SUBJECTIVE:      Fabien Randolph is a 33 year old male     Chief Complaint   Patient presents with    Back Pain     Symptoms since the 19th.  Back pain through the whole back. Has Osteoarthritis/ Stenosis       Patient is being seen here for symptoms as stated above    [place] low back pain.  History of multiple stress fractures at the lower lumbar area.  History of stenosis in the lumbar and thoracic area as per patient.  Is here as he wants to ask if there is alternative medications that I can provide that is not opioid.    -no fever, no chills,   -no sob, no chest pain      Review of systems:    A review of systems was performed and findings relevant to these symptoms are included  in the HPI.         OBJECTIVE:  Physical Exam:    Visit Vitals  /62 (BP Location: LUE - Left upper extremity, Patient Position: Sitting, Cuff Size: Large Adult)   Pulse (!) 102   Resp 16   Wt (!) 150.4 kg (331 lb 8 oz)   SpO2 99%   BMI 50.40 kg/m²        CONSTITUTIONAL: Well-hydrated, well-nourished male who appears to be in no acute distress. Awake, alert and cooperative.    Back: No gross deformity.  No swelling.  No redness.  No lesions.  Midline tenderness in the thoracic and lumbar area.  Negative tenderness on bilateral paralumbar muscle      Ulysses Boco MD  [facility] - Urgent Care                 Stable

## 2023-12-13 ENCOUNTER — OUTPATIENT (OUTPATIENT)
Dept: OUTPATIENT SERVICES | Facility: HOSPITAL | Age: 43
LOS: 1 days | End: 2023-12-13
Payer: MEDICARE

## 2023-12-13 ENCOUNTER — TRANSCRIPTION ENCOUNTER (OUTPATIENT)
Age: 43
End: 2023-12-13

## 2023-12-13 ENCOUNTER — APPOINTMENT (OUTPATIENT)
Dept: SURGERY | Facility: HOSPITAL | Age: 43
End: 2023-12-13

## 2023-12-13 ENCOUNTER — RESULT REVIEW (OUTPATIENT)
Age: 43
End: 2023-12-13

## 2023-12-13 VITALS
RESPIRATION RATE: 14 BRPM | SYSTOLIC BLOOD PRESSURE: 159 MMHG | HEIGHT: 78 IN | HEART RATE: 68 BPM | TEMPERATURE: 98 F | OXYGEN SATURATION: 96 % | DIASTOLIC BLOOD PRESSURE: 97 MMHG | WEIGHT: 315 LBS

## 2023-12-13 VITALS
HEART RATE: 74 BPM | SYSTOLIC BLOOD PRESSURE: 165 MMHG | DIASTOLIC BLOOD PRESSURE: 92 MMHG | OXYGEN SATURATION: 99 % | RESPIRATION RATE: 17 BRPM

## 2023-12-13 DIAGNOSIS — Z98.890 OTHER SPECIFIED POSTPROCEDURAL STATES: Chronic | ICD-10-CM

## 2023-12-13 DIAGNOSIS — E66.01 MORBID (SEVERE) OBESITY DUE TO EXCESS CALORIES: ICD-10-CM

## 2023-12-13 LAB
ALBUMIN SERPL ELPH-MCNC: 3.3 G/DL — LOW (ref 3.5–5)
ALBUMIN SERPL ELPH-MCNC: 3.3 G/DL — LOW (ref 3.5–5)
ALP SERPL-CCNC: 121 U/L — HIGH (ref 40–120)
ALP SERPL-CCNC: 121 U/L — HIGH (ref 40–120)
ALT FLD-CCNC: 63 U/L DA — HIGH (ref 10–60)
ALT FLD-CCNC: 63 U/L DA — HIGH (ref 10–60)
ANION GAP SERPL CALC-SCNC: 6 MMOL/L — SIGNIFICANT CHANGE UP (ref 5–17)
ANION GAP SERPL CALC-SCNC: 6 MMOL/L — SIGNIFICANT CHANGE UP (ref 5–17)
AST SERPL-CCNC: 46 U/L — HIGH (ref 10–40)
AST SERPL-CCNC: 46 U/L — HIGH (ref 10–40)
BILIRUB SERPL-MCNC: 0.5 MG/DL — SIGNIFICANT CHANGE UP (ref 0.2–1.2)
BILIRUB SERPL-MCNC: 0.5 MG/DL — SIGNIFICANT CHANGE UP (ref 0.2–1.2)
BUN SERPL-MCNC: 56 MG/DL — HIGH (ref 7–18)
BUN SERPL-MCNC: 56 MG/DL — HIGH (ref 7–18)
CALCIUM SERPL-MCNC: 9.8 MG/DL — SIGNIFICANT CHANGE UP (ref 8.4–10.5)
CALCIUM SERPL-MCNC: 9.8 MG/DL — SIGNIFICANT CHANGE UP (ref 8.4–10.5)
CHLORIDE SERPL-SCNC: 98 MMOL/L — SIGNIFICANT CHANGE UP (ref 96–108)
CHLORIDE SERPL-SCNC: 98 MMOL/L — SIGNIFICANT CHANGE UP (ref 96–108)
CO2 SERPL-SCNC: 29 MMOL/L — SIGNIFICANT CHANGE UP (ref 22–31)
CO2 SERPL-SCNC: 29 MMOL/L — SIGNIFICANT CHANGE UP (ref 22–31)
CREAT SERPL-MCNC: 6.07 MG/DL — HIGH (ref 0.5–1.3)
CREAT SERPL-MCNC: 6.07 MG/DL — HIGH (ref 0.5–1.3)
EGFR: 11 ML/MIN/1.73M2 — LOW
EGFR: 11 ML/MIN/1.73M2 — LOW
GLUCOSE SERPL-MCNC: 96 MG/DL — SIGNIFICANT CHANGE UP (ref 70–99)
GLUCOSE SERPL-MCNC: 96 MG/DL — SIGNIFICANT CHANGE UP (ref 70–99)
POTASSIUM SERPL-MCNC: 4 MMOL/L — SIGNIFICANT CHANGE UP (ref 3.5–5.3)
POTASSIUM SERPL-MCNC: 4 MMOL/L — SIGNIFICANT CHANGE UP (ref 3.5–5.3)
POTASSIUM SERPL-SCNC: 4 MMOL/L — SIGNIFICANT CHANGE UP (ref 3.5–5.3)
POTASSIUM SERPL-SCNC: 4 MMOL/L — SIGNIFICANT CHANGE UP (ref 3.5–5.3)
PROT SERPL-MCNC: 8.4 G/DL — HIGH (ref 6–8.3)
PROT SERPL-MCNC: 8.4 G/DL — HIGH (ref 6–8.3)
SODIUM SERPL-SCNC: 133 MMOL/L — LOW (ref 135–145)
SODIUM SERPL-SCNC: 133 MMOL/L — LOW (ref 135–145)

## 2023-12-13 PROCEDURE — 88312 SPECIAL STAINS GROUP 1: CPT | Mod: 26

## 2023-12-13 PROCEDURE — 43239 EGD BIOPSY SINGLE/MULTIPLE: CPT

## 2023-12-13 PROCEDURE — 80053 COMPREHEN METABOLIC PANEL: CPT

## 2023-12-13 PROCEDURE — 88305 TISSUE EXAM BY PATHOLOGIST: CPT

## 2023-12-13 PROCEDURE — 36415 COLL VENOUS BLD VENIPUNCTURE: CPT

## 2023-12-13 PROCEDURE — 88305 TISSUE EXAM BY PATHOLOGIST: CPT | Mod: 26

## 2023-12-13 PROCEDURE — 88312 SPECIAL STAINS GROUP 1: CPT

## 2023-12-13 NOTE — ASU PATIENT PROFILE, ADULT - FALL HARM RISK - UNIVERSAL INTERVENTIONS
Bed in lowest position, wheels locked, appropriate side rails in place/Call bell, personal items and telephone in reach/Instruct patient to call for assistance before getting out of bed or chair/Non-slip footwear when patient is out of bed/[place] to call system/Physically safe environment - no spills, clutter or unnecessary equipment/Purposeful Proactive Rounding/Room/bathroom lighting operational, light cord in reach Bed in lowest position, wheels locked, appropriate side rails in place/Call bell, personal items and telephone in reach/Instruct patient to call for assistance before getting out of bed or chair/Non-slip footwear when patient is out of bed/[place] to call system/Physically safe environment - no spills, clutter or unnecessary equipment/Purposeful Proactive Rounding/Room/bathroom lighting operational, light cord in reach

## 2023-12-13 NOTE — ASU PATIENT PROFILE, ADULT - NSCAFFEINETYPE_GEN_ALL_CORE_SD
42 yr old female with HX Gastritis (last EGD Sep 2020: nml), chronic RLQ pain due to GYN issues - now with continued epigastric pain, and mild RLQ pain  Repeat CT scan 12/30 shows normal appendix and gastritis of antrum; on Protinix BID    1. R/O early  appendicitis   - no evidence of appendicitis on recent CT; pain improved      2. Gastritis   - continue protonix BID   - outpt F/U with private GI for furthe recommendations   - continue PPI BID and taper (as per GI)  - advance diet     D/W Dr. Prince 42 yr old female with HX Gastritis (last EGD Sep 2020: nml), chronic RLQ pain due to GYN issues - now with continued epigastric pain, and mild RLQ pain  Repeat CT scan 12/30 shows normal appendix and gastritis of antrum; on Protinix BID    1. R/O early  appendicitis   - no evidence of appendicitis on recent CT; pain improved   - would continue abx at this time      2. Gastritis   - continue protonix BID    - outpt F/U with private GI for furthe recommendations   - continue PPI BID upon dishcharge and taper as per GI  - advance diet     D/W Dr. Prince coffee

## 2023-12-13 NOTE — ASU PATIENT PROFILE, ADULT - NSICDXPASTSURGICALHX_GEN_ALL_CORE_FT
PAST SURGICAL HISTORY:  H/O hernia repair     S/P arteriovenous (AV) fistula creation right side

## 2023-12-13 NOTE — ASU DISCHARGE PLAN (ADULT/PEDIATRIC) - NS MD DC FALL RISK RISK
For information on Fall & Injury Prevention, visit: [email or web address] OR  [email or web address] OR  https://www.cdc.gov/steadi/patient.html For information on Fall & Injury Prevention, visit: [email or web address] OR  [email or web address] OR  https://www.cdc.gov/steadi/patient.html

## 2023-12-15 LAB
SURGICAL PATHOLOGY STUDY: SIGNIFICANT CHANGE UP
SURGICAL PATHOLOGY STUDY: SIGNIFICANT CHANGE UP

## 2024-01-01 NOTE — PROGRESS NOTE ADULT - PROBLEM SELECTOR PLAN 5
Shelly arrives via wheelchair with her granddaughter. She was suppose to be here at 1130. I had to call her through an  to tell her she missed her appointment and see if she could come in. They said she could be here by 2 o'clock. She arrived at 2:07. I went over the plan of car with the patient and grand-daughter. They verbalized understanding. They state that she had no issues after her last dose. PIV started in her right wrist with great blood return throughout.  Iron push given over 7 minutes. Pt monitored for 30 minutes. No signs and symptoms of a reaction. VSS. Grand-daughter given a copy of AVS with next appointment highlighted. Pt left via wheel chair with grand-daughter at 1520.    
Female
Resolved now.
Resolved now.  trops are negative x1 set  Pain most likely is GI in origin secondary to the vomiting.
c/w escitalopram
c/w escitalopram

## 2024-01-01 NOTE — ASU PREOP CHECKLIST - NS PREOP CHK MONITOR ANESTHESIA CONSENT
"Baby is expected to be in the NICU for prolonged period of time due to extreme prematurity. Social work consulted on admission.    Social: Mom (Deena), Dad (Lamont Sr.) Baby (Lamont Jr., "TJ")  Last updated 6/22 at bedside per NNP.  6/23 Father updated at bedside.   6/26 Parents updated at bedside per NNP  6/27 Mother and father at bedside, updated per NNP. Voice understanding of plan of care.   6/28 Mother updated at bedside by NNP  6/29 parents at bedside and updated by NNP; father smelled of marijuana  6/30 parents updated at the bedside by NNP  7/01 parents updated at bedside by NNP    Plan:  Keep parents updated on infant status and plan of care.  Follow with .  " done

## 2024-01-11 ENCOUNTER — APPOINTMENT (OUTPATIENT)
Dept: SURGERY | Facility: CLINIC | Age: 44
End: 2024-01-11
Payer: MEDICARE

## 2024-01-11 VITALS
HEIGHT: 78 IN | HEART RATE: 82 BPM | DIASTOLIC BLOOD PRESSURE: 84 MMHG | BODY MASS INDEX: 36.45 KG/M2 | OXYGEN SATURATION: 91 % | SYSTOLIC BLOOD PRESSURE: 162 MMHG | WEIGHT: 315 LBS

## 2024-01-11 PROCEDURE — 99213 OFFICE O/P EST LOW 20 MIN: CPT

## 2024-01-16 NOTE — ASSESSMENT
[FreeTextEntry1] : PRESTON JOHNSON is a 43 year old male with morbid obesity (BMI 44.5 kg/m2) for monthly weigh ins.    Patient is doing well, offers no complaints. Patient results reviewed with the patient, patient verbalized the understanding.  Today's weight is 395 lb and BMI 44.5 /m2.    Today is third month of weigh ins.  Patient will continue to obtain the clearances RTO next month.   Upper GI for preop

## 2024-01-16 NOTE — PHYSICAL EXAM
[Obese] : obese [Normal] : affect appropriate [de-identified] : Normoactive bowel sounds, soft and nontender, no hepatosplenomegaly or masses noted, well healed scars

## 2024-01-16 NOTE — HISTORY OF PRESENT ILLNESS
[de-identified] : PRESTON JOHNSON is a 43 year old male with morbid obesity (BMI 44.5 kg/m2) who present in the office for pre-operative follow-up and weight management program in preparation for Robotic assisted  Laparoscopic Sleeve Gastrectomy.  Patient is here for monthly weigh ins. Today's weight is 395 lb and BMI 44.5 /m2.  Today patient is doing well, offers no complaints. Patient results reviewed with the patient, patient verbalized the understanding. Patient will continue to obtain the clearances RTO next month.   -Nutrition: cleared to proceed  -Cardiology: NPA w/ Dr. Galeana - 1/30/24 -Pulmonary: Dr Plata he had a sleep study 6 month ago, pending clearance -GI: EGD w/ Dr. Bueno, Negative for H. pylori.  -Psych: looking for a provider that take the insurance -Weigh ins: 3rd. weigh ins -PCP Letter: pending letter of support.

## 2024-01-24 ENCOUNTER — INPATIENT (INPATIENT)
Facility: HOSPITAL | Age: 44
LOS: 8 days | Discharge: HOME | DRG: 417 | End: 2024-02-02
Attending: INTERNAL MEDICINE | Admitting: INTERNAL MEDICINE
Payer: MEDICARE

## 2024-01-24 VITALS
HEIGHT: 78 IN | SYSTOLIC BLOOD PRESSURE: 187 MMHG | OXYGEN SATURATION: 99 % | DIASTOLIC BLOOD PRESSURE: 95 MMHG | HEART RATE: 66 BPM | TEMPERATURE: 98 F | RESPIRATION RATE: 20 BRPM

## 2024-01-24 DIAGNOSIS — Z98.890 OTHER SPECIFIED POSTPROCEDURAL STATES: Chronic | ICD-10-CM

## 2024-01-24 PROCEDURE — 99285 EMERGENCY DEPT VISIT HI MDM: CPT

## 2024-01-25 DIAGNOSIS — Z29.9 ENCOUNTER FOR PROPHYLACTIC MEASURES, UNSPECIFIED: ICD-10-CM

## 2024-01-25 DIAGNOSIS — N18.6 END STAGE RENAL DISEASE: ICD-10-CM

## 2024-01-25 DIAGNOSIS — I10 ESSENTIAL (PRIMARY) HYPERTENSION: ICD-10-CM

## 2024-01-25 DIAGNOSIS — K85.90 ACUTE PANCREATITIS WITHOUT NECROSIS OR INFECTION, UNSPECIFIED: ICD-10-CM

## 2024-01-25 DIAGNOSIS — J44.9 CHRONIC OBSTRUCTIVE PULMONARY DISEASE, UNSPECIFIED: ICD-10-CM

## 2024-01-25 DIAGNOSIS — F20.9 SCHIZOPHRENIA, UNSPECIFIED: ICD-10-CM

## 2024-01-25 LAB
ALBUMIN SERPL ELPH-MCNC: 3.4 G/DL — LOW (ref 3.5–5)
ALP SERPL-CCNC: 100 U/L — SIGNIFICANT CHANGE UP (ref 40–120)
ALT FLD-CCNC: 102 U/L DA — HIGH (ref 10–60)
ANION GAP SERPL CALC-SCNC: 0 MMOL/L — LOW (ref 5–17)
ANION GAP SERPL CALC-SCNC: 9 MMOL/L — SIGNIFICANT CHANGE UP (ref 5–17)
AST SERPL-CCNC: 74 U/L — HIGH (ref 10–40)
BASE EXCESS BLDV CALC-SCNC: 1.2 MMOL/L — SIGNIFICANT CHANGE UP
BASOPHILS # BLD AUTO: 0.02 K/UL — SIGNIFICANT CHANGE UP (ref 0–0.2)
BASOPHILS NFR BLD AUTO: 0.3 % — SIGNIFICANT CHANGE UP (ref 0–2)
BILIRUB SERPL-MCNC: 0.4 MG/DL — SIGNIFICANT CHANGE UP (ref 0.2–1.2)
BUN SERPL-MCNC: 115 MG/DL — HIGH (ref 7–18)
BUN SERPL-MCNC: 122 MG/DL — HIGH (ref 7–18)
CA-I SERPL-SCNC: SIGNIFICANT CHANGE UP MMOL/L (ref 1.15–1.33)
CALCIUM SERPL-MCNC: 11.5 MG/DL — HIGH (ref 8.4–10.5)
CALCIUM SERPL-MCNC: 12 MG/DL — HIGH (ref 8.4–10.5)
CHLORIDE SERPL-SCNC: 94 MMOL/L — LOW (ref 96–108)
CHLORIDE SERPL-SCNC: 95 MMOL/L — LOW (ref 96–108)
CO2 SERPL-SCNC: 27 MMOL/L — SIGNIFICANT CHANGE UP (ref 22–31)
CO2 SERPL-SCNC: 36 MMOL/L — HIGH (ref 22–31)
CREAT SERPL-MCNC: 11.3 MG/DL — HIGH (ref 0.5–1.3)
CREAT SERPL-MCNC: 11.5 MG/DL — HIGH (ref 0.5–1.3)
EGFR: 5 ML/MIN/1.73M2 — LOW
EGFR: 5 ML/MIN/1.73M2 — LOW
EOSINOPHIL # BLD AUTO: 0.04 K/UL — SIGNIFICANT CHANGE UP (ref 0–0.5)
EOSINOPHIL NFR BLD AUTO: 0.5 % — SIGNIFICANT CHANGE UP (ref 0–6)
FLUAV AG NPH QL: SIGNIFICANT CHANGE UP
FLUBV AG NPH QL: SIGNIFICANT CHANGE UP
GAS PNL BLDV: 132 MMOL/L — LOW (ref 136–145)
GAS PNL BLDV: SIGNIFICANT CHANGE UP
GLUCOSE BLDC GLUCOMTR-MCNC: 104 MG/DL — HIGH (ref 70–99)
GLUCOSE SERPL-MCNC: 91 MG/DL — SIGNIFICANT CHANGE UP (ref 70–99)
GLUCOSE SERPL-MCNC: 99 MG/DL — SIGNIFICANT CHANGE UP (ref 70–99)
HBV SURFACE AB SER-ACNC: REACTIVE
HBV SURFACE AG SER-ACNC: SIGNIFICANT CHANGE UP
HCO3 BLDV-SCNC: 29 MMOL/L — SIGNIFICANT CHANGE UP (ref 22–29)
HCT VFR BLD CALC: 26.7 % — LOW (ref 39–50)
HCT VFR BLD CALC: 28.1 % — LOW (ref 39–50)
HGB BLD-MCNC: 8.8 G/DL — LOW (ref 13–17)
HGB BLD-MCNC: 9.3 G/DL — LOW (ref 13–17)
IMM GRANULOCYTES NFR BLD AUTO: 0.4 % — SIGNIFICANT CHANGE UP (ref 0–0.9)
LACTATE BLDV-MCNC: 0.7 MMOL/L — SIGNIFICANT CHANGE UP (ref 0.5–2)
LIDOCAIN IGE QN: 304 U/L — HIGH (ref 13–75)
LYMPHOCYTES # BLD AUTO: 0.65 K/UL — LOW (ref 1–3.3)
LYMPHOCYTES # BLD AUTO: 8.8 % — LOW (ref 13–44)
MAGNESIUM SERPL-MCNC: 2.9 MG/DL — HIGH (ref 1.6–2.6)
MCHC RBC-ENTMCNC: 33 GM/DL — SIGNIFICANT CHANGE UP (ref 32–36)
MCHC RBC-ENTMCNC: 33.1 GM/DL — SIGNIFICANT CHANGE UP (ref 32–36)
MCHC RBC-ENTMCNC: 33.3 PG — SIGNIFICANT CHANGE UP (ref 27–34)
MCHC RBC-ENTMCNC: 33.7 PG — SIGNIFICANT CHANGE UP (ref 27–34)
MCV RBC AUTO: 101.1 FL — HIGH (ref 80–100)
MCV RBC AUTO: 101.8 FL — HIGH (ref 80–100)
MONOCYTES # BLD AUTO: 0.85 K/UL — SIGNIFICANT CHANGE UP (ref 0–0.9)
MONOCYTES NFR BLD AUTO: 11.5 % — SIGNIFICANT CHANGE UP (ref 2–14)
NEUTROPHILS # BLD AUTO: 5.83 K/UL — SIGNIFICANT CHANGE UP (ref 1.8–7.4)
NEUTROPHILS NFR BLD AUTO: 78.5 % — HIGH (ref 43–77)
NRBC # BLD: 0 /100 WBCS — SIGNIFICANT CHANGE UP (ref 0–0)
NRBC # BLD: 0 /100 WBCS — SIGNIFICANT CHANGE UP (ref 0–0)
PCO2 BLDV: 67 MMHG — HIGH (ref 42–55)
PH BLDV: 7.25 — LOW (ref 7.32–7.43)
PHOSPHATE SERPL-MCNC: 8 MG/DL — HIGH (ref 2.5–4.5)
PLATELET # BLD AUTO: 146 K/UL — LOW (ref 150–400)
PLATELET # BLD AUTO: 153 K/UL — SIGNIFICANT CHANGE UP (ref 150–400)
PO2 BLDV: 31 MMHG — SIGNIFICANT CHANGE UP
POTASSIUM BLDV-SCNC: 4.8 MMOL/L — SIGNIFICANT CHANGE UP (ref 3.5–5.1)
POTASSIUM SERPL-MCNC: 4.7 MMOL/L — SIGNIFICANT CHANGE UP (ref 3.5–5.3)
POTASSIUM SERPL-MCNC: 4.8 MMOL/L — SIGNIFICANT CHANGE UP (ref 3.5–5.3)
POTASSIUM SERPL-SCNC: 4.7 MMOL/L — SIGNIFICANT CHANGE UP (ref 3.5–5.3)
POTASSIUM SERPL-SCNC: 4.8 MMOL/L — SIGNIFICANT CHANGE UP (ref 3.5–5.3)
PROT SERPL-MCNC: 8.3 G/DL — SIGNIFICANT CHANGE UP (ref 6–8.3)
RBC # BLD: 2.64 M/UL — LOW (ref 4.2–5.8)
RBC # BLD: 2.76 M/UL — LOW (ref 4.2–5.8)
RBC # FLD: 13.6 % — SIGNIFICANT CHANGE UP (ref 10.3–14.5)
RBC # FLD: 13.7 % — SIGNIFICANT CHANGE UP (ref 10.3–14.5)
SAO2 % BLDV: 51 % — SIGNIFICANT CHANGE UP
SARS-COV-2 RNA SPEC QL NAA+PROBE: SIGNIFICANT CHANGE UP
SODIUM SERPL-SCNC: 130 MMOL/L — LOW (ref 135–145)
SODIUM SERPL-SCNC: 131 MMOL/L — LOW (ref 135–145)
TRIGL SERPL-MCNC: 110 MG/DL — SIGNIFICANT CHANGE UP
WBC # BLD: 7.25 K/UL — SIGNIFICANT CHANGE UP (ref 3.8–10.5)
WBC # BLD: 7.42 K/UL — SIGNIFICANT CHANGE UP (ref 3.8–10.5)
WBC # FLD AUTO: 7.25 K/UL — SIGNIFICANT CHANGE UP (ref 3.8–10.5)
WBC # FLD AUTO: 7.42 K/UL — SIGNIFICANT CHANGE UP (ref 3.8–10.5)

## 2024-01-25 PROCEDURE — 93010 ELECTROCARDIOGRAM REPORT: CPT

## 2024-01-25 PROCEDURE — 99221 1ST HOSP IP/OBS SF/LOW 40: CPT

## 2024-01-25 PROCEDURE — 71045 X-RAY EXAM CHEST 1 VIEW: CPT | Mod: 26

## 2024-01-25 PROCEDURE — 74177 CT ABD & PELVIS W/CONTRAST: CPT | Mod: 26

## 2024-01-25 PROCEDURE — 99222 1ST HOSP IP/OBS MODERATE 55: CPT | Mod: GC

## 2024-01-25 RX ORDER — TRAZODONE HCL 50 MG
100 TABLET ORAL DAILY
Refills: 0 | Status: DISCONTINUED | OUTPATIENT
Start: 2024-01-25 | End: 2024-02-02

## 2024-01-25 RX ORDER — ALBUTEROL 90 UG/1
2 AEROSOL, METERED ORAL
Qty: 0 | Refills: 0 | DISCHARGE

## 2024-01-25 RX ORDER — ACETAMINOPHEN 500 MG
650 TABLET ORAL EVERY 6 HOURS
Refills: 0 | Status: DISCONTINUED | OUTPATIENT
Start: 2024-01-25 | End: 2024-01-31

## 2024-01-25 RX ORDER — ESCITALOPRAM OXALATE 10 MG/1
1 TABLET, FILM COATED ORAL
Qty: 0 | Refills: 0 | DISCHARGE

## 2024-01-25 RX ORDER — CINACALCET 30 MG/1
30 TABLET, FILM COATED ORAL DAILY
Refills: 0 | Status: DISCONTINUED | OUTPATIENT
Start: 2024-01-25 | End: 2024-01-26

## 2024-01-25 RX ORDER — HYDROMORPHONE HYDROCHLORIDE 2 MG/ML
0.5 INJECTION INTRAMUSCULAR; INTRAVENOUS; SUBCUTANEOUS EVERY 6 HOURS
Refills: 0 | Status: DISCONTINUED | OUTPATIENT
Start: 2024-01-25 | End: 2024-01-31

## 2024-01-25 RX ORDER — HYDROMORPHONE HYDROCHLORIDE 2 MG/ML
0.25 INJECTION INTRAMUSCULAR; INTRAVENOUS; SUBCUTANEOUS EVERY 6 HOURS
Refills: 0 | Status: DISCONTINUED | OUTPATIENT
Start: 2024-01-25 | End: 2024-01-31

## 2024-01-25 RX ORDER — HYDRALAZINE HCL 50 MG
1 TABLET ORAL
Qty: 0 | Refills: 0 | DISCHARGE

## 2024-01-25 RX ORDER — BUDESONIDE AND FORMOTEROL FUMARATE DIHYDRATE 160; 4.5 UG/1; UG/1
2 AEROSOL RESPIRATORY (INHALATION)
Refills: 0 | Status: DISCONTINUED | OUTPATIENT
Start: 2024-01-25 | End: 2024-02-02

## 2024-01-25 RX ORDER — ERYTHROPOIETIN 10000 [IU]/ML
4000 INJECTION, SOLUTION INTRAVENOUS; SUBCUTANEOUS
Refills: 0 | Status: DISCONTINUED | OUTPATIENT
Start: 2024-01-25 | End: 2024-01-30

## 2024-01-25 RX ORDER — CHLORHEXIDINE GLUCONATE 213 G/1000ML
1 SOLUTION TOPICAL DAILY
Refills: 0 | Status: DISCONTINUED | OUTPATIENT
Start: 2024-01-25 | End: 2024-02-02

## 2024-01-25 RX ORDER — QUETIAPINE FUMARATE 200 MG/1
2 TABLET, FILM COATED ORAL
Refills: 0 | DISCHARGE

## 2024-01-25 RX ORDER — RISPERIDONE 4 MG/1
1 TABLET ORAL
Qty: 0 | Refills: 0 | DISCHARGE

## 2024-01-25 RX ORDER — CALCITRIOL 0.5 UG/1
0.5 CAPSULE ORAL DAILY
Refills: 0 | Status: DISCONTINUED | OUTPATIENT
Start: 2024-01-25 | End: 2024-02-02

## 2024-01-25 RX ORDER — LANOLIN ALCOHOL/MO/W.PET/CERES
3 CREAM (GRAM) TOPICAL AT BEDTIME
Refills: 0 | Status: DISCONTINUED | OUTPATIENT
Start: 2024-01-25 | End: 2024-02-02

## 2024-01-25 RX ORDER — HYDROXYZINE HCL 10 MG
1 TABLET ORAL
Qty: 0 | Refills: 0 | DISCHARGE

## 2024-01-25 RX ORDER — MONTELUKAST 4 MG/1
0 TABLET, CHEWABLE ORAL
Qty: 0 | Refills: 0 | DISCHARGE

## 2024-01-25 RX ORDER — QUETIAPINE FUMARATE 200 MG/1
300 TABLET, FILM COATED ORAL DAILY
Refills: 0 | Status: DISCONTINUED | OUTPATIENT
Start: 2024-01-25 | End: 2024-01-26

## 2024-01-25 RX ORDER — RISPERIDONE 4 MG/1
2 TABLET ORAL
Refills: 0 | Status: DISCONTINUED | OUTPATIENT
Start: 2024-01-25 | End: 2024-02-02

## 2024-01-25 RX ORDER — MIRTAZAPINE 45 MG/1
1 TABLET, ORALLY DISINTEGRATING ORAL
Qty: 0 | Refills: 0 | DISCHARGE

## 2024-01-25 RX ORDER — HYDRALAZINE HCL 50 MG
1 TABLET ORAL
Refills: 0 | DISCHARGE

## 2024-01-25 RX ORDER — PANTOPRAZOLE SODIUM 20 MG/1
40 TABLET, DELAYED RELEASE ORAL ONCE
Refills: 0 | Status: COMPLETED | OUTPATIENT
Start: 2024-01-25 | End: 2024-01-25

## 2024-01-25 RX ORDER — TIOTROPIUM BROMIDE 18 UG/1
2 CAPSULE ORAL; RESPIRATORY (INHALATION)
Qty: 0 | Refills: 0 | DISCHARGE

## 2024-01-25 RX ORDER — HALOPERIDOL DECANOATE 100 MG/ML
2 INJECTION INTRAMUSCULAR DAILY
Refills: 0 | Status: DISCONTINUED | OUTPATIENT
Start: 2024-01-25 | End: 2024-02-02

## 2024-01-25 RX ORDER — CALCITRIOL 0.5 UG/1
1 CAPSULE ORAL
Qty: 0 | Refills: 4 | DISCHARGE

## 2024-01-25 RX ORDER — SENNA PLUS 8.6 MG/1
2 TABLET ORAL
Qty: 0 | Refills: 0 | DISCHARGE

## 2024-01-25 RX ORDER — TIOTROPIUM BROMIDE 18 UG/1
2 CAPSULE ORAL; RESPIRATORY (INHALATION) DAILY
Refills: 0 | Status: DISCONTINUED | OUTPATIENT
Start: 2024-01-25 | End: 2024-02-02

## 2024-01-25 RX ORDER — IPRATROPIUM/ALBUTEROL SULFATE 18-103MCG
3 AEROSOL WITH ADAPTER (GRAM) INHALATION
Qty: 0 | Refills: 0 | DISCHARGE

## 2024-01-25 RX ORDER — CARVEDILOL PHOSPHATE 80 MG/1
1 CAPSULE, EXTENDED RELEASE ORAL
Refills: 0 | DISCHARGE

## 2024-01-25 RX ORDER — SEVELAMER CARBONATE 2400 MG/1
800 POWDER, FOR SUSPENSION ORAL THREE TIMES A DAY
Refills: 0 | Status: DISCONTINUED | OUTPATIENT
Start: 2024-01-25 | End: 2024-01-26

## 2024-01-25 RX ORDER — ATORVASTATIN CALCIUM 80 MG/1
40 TABLET, FILM COATED ORAL AT BEDTIME
Refills: 0 | Status: DISCONTINUED | OUTPATIENT
Start: 2024-01-25 | End: 2024-02-02

## 2024-01-25 RX ORDER — HEPARIN SODIUM 5000 [USP'U]/ML
5000 INJECTION INTRAVENOUS; SUBCUTANEOUS EVERY 8 HOURS
Refills: 0 | Status: DISCONTINUED | OUTPATIENT
Start: 2024-01-25 | End: 2024-01-30

## 2024-01-25 RX ORDER — HYDRALAZINE HCL 50 MG
50 TABLET ORAL ONCE
Refills: 0 | Status: COMPLETED | OUTPATIENT
Start: 2024-01-25 | End: 2024-01-25

## 2024-01-25 RX ORDER — IPRATROPIUM/ALBUTEROL SULFATE 18-103MCG
3 AEROSOL WITH ADAPTER (GRAM) INHALATION ONCE
Refills: 0 | Status: COMPLETED | OUTPATIENT
Start: 2024-01-25 | End: 2024-01-25

## 2024-01-25 RX ORDER — ESCITALOPRAM OXALATE 10 MG/1
10 TABLET, FILM COATED ORAL DAILY
Refills: 0 | Status: DISCONTINUED | OUTPATIENT
Start: 2024-01-25 | End: 2024-02-02

## 2024-01-25 RX ORDER — SIMVASTATIN 20 MG/1
1 TABLET, FILM COATED ORAL
Qty: 0 | Refills: 0 | DISCHARGE

## 2024-01-25 RX ORDER — RISPERIDONE 4 MG/1
1 TABLET ORAL
Refills: 0 | DISCHARGE

## 2024-01-25 RX ORDER — HALOPERIDOL DECANOATE 100 MG/ML
1 INJECTION INTRAMUSCULAR
Qty: 0 | Refills: 0 | DISCHARGE

## 2024-01-25 RX ORDER — AMLODIPINE BESYLATE 2.5 MG/1
1 TABLET ORAL
Qty: 0 | Refills: 0 | DISCHARGE

## 2024-01-25 RX ORDER — CLOPIDOGREL BISULFATE 75 MG/1
75 TABLET, FILM COATED ORAL DAILY
Refills: 0 | Status: DISCONTINUED | OUTPATIENT
Start: 2024-01-25 | End: 2024-01-25

## 2024-01-25 RX ORDER — ONDANSETRON 8 MG/1
4 TABLET, FILM COATED ORAL ONCE
Refills: 0 | Status: COMPLETED | OUTPATIENT
Start: 2024-01-25 | End: 2024-01-25

## 2024-01-25 RX ORDER — ASCORBIC ACID 60 MG
1 TABLET,CHEWABLE ORAL
Qty: 0 | Refills: 0 | DISCHARGE

## 2024-01-25 RX ORDER — HYDRALAZINE HCL 50 MG
50 TABLET ORAL DAILY
Refills: 0 | Status: DISCONTINUED | OUTPATIENT
Start: 2024-01-25 | End: 2024-01-26

## 2024-01-25 RX ORDER — SEVELAMER CARBONATE 2400 MG/1
3 POWDER, FOR SUSPENSION ORAL
Qty: 0 | Refills: 0 | DISCHARGE

## 2024-01-25 RX ORDER — AMLODIPINE BESYLATE 2.5 MG/1
5 TABLET ORAL DAILY
Refills: 0 | Status: DISCONTINUED | OUTPATIENT
Start: 2024-01-25 | End: 2024-02-02

## 2024-01-25 RX ORDER — CIPROFLOXACIN LACTATE 400MG/40ML
400 VIAL (ML) INTRAVENOUS ONCE
Refills: 0 | Status: DISCONTINUED | OUTPATIENT
Start: 2024-01-25 | End: 2024-01-25

## 2024-01-25 RX ORDER — FERROUS SULFATE 325(65) MG
1 TABLET ORAL
Qty: 0 | Refills: 0 | DISCHARGE

## 2024-01-25 RX ORDER — MORPHINE SULFATE 50 MG/1
4 CAPSULE, EXTENDED RELEASE ORAL ONCE
Refills: 0 | Status: DISCONTINUED | OUTPATIENT
Start: 2024-01-25 | End: 2024-01-25

## 2024-01-25 RX ORDER — ATORVASTATIN CALCIUM 80 MG/1
1 TABLET, FILM COATED ORAL
Qty: 0 | Refills: 0 | DISCHARGE

## 2024-01-25 RX ORDER — ALBUTEROL 90 UG/1
1 AEROSOL, METERED ORAL
Qty: 0 | Refills: 0 | DISCHARGE

## 2024-01-25 RX ORDER — QUETIAPINE FUMARATE 200 MG/1
1 TABLET, FILM COATED ORAL
Refills: 0 | DISCHARGE

## 2024-01-25 RX ORDER — TRAZODONE HCL 50 MG
1 TABLET ORAL
Qty: 0 | Refills: 0 | DISCHARGE

## 2024-01-25 RX ORDER — QUETIAPINE FUMARATE 200 MG/1
1 TABLET, FILM COATED ORAL
Qty: 0 | Refills: 0 | DISCHARGE

## 2024-01-25 RX ORDER — BUDESONIDE AND FORMOTEROL FUMARATE DIHYDRATE 160; 4.5 UG/1; UG/1
2 AEROSOL RESPIRATORY (INHALATION)
Qty: 0 | Refills: 0 | DISCHARGE

## 2024-01-25 RX ORDER — FLUPHENAZINE HYDROCHLORIDE 1 MG/1
1 TABLET, FILM COATED ORAL
Refills: 0 | DISCHARGE

## 2024-01-25 RX ORDER — MIRTAZAPINE 45 MG/1
15 TABLET, ORALLY DISINTEGRATING ORAL DAILY
Refills: 0 | Status: DISCONTINUED | OUTPATIENT
Start: 2024-01-25 | End: 2024-02-02

## 2024-01-25 RX ORDER — HALOPERIDOL DECANOATE 100 MG/ML
1 INJECTION INTRAMUSCULAR
Refills: 0 | DISCHARGE

## 2024-01-25 RX ORDER — ONDANSETRON 8 MG/1
4 TABLET, FILM COATED ORAL EVERY 8 HOURS
Refills: 0 | Status: DISCONTINUED | OUTPATIENT
Start: 2024-01-25 | End: 2024-01-26

## 2024-01-25 RX ORDER — CARVEDILOL PHOSPHATE 80 MG/1
25 CAPSULE, EXTENDED RELEASE ORAL EVERY 12 HOURS
Refills: 0 | Status: DISCONTINUED | OUTPATIENT
Start: 2024-01-25 | End: 2024-02-02

## 2024-01-25 RX ORDER — TRAZODONE HCL 50 MG
2 TABLET ORAL
Refills: 0 | DISCHARGE

## 2024-01-25 RX ORDER — BUDESONIDE, GLYCOPYRROLATE, AND FORMOTEROL FUMARATE 160; 9; 4.8 UG/1; UG/1; UG/1
2 AEROSOL, METERED RESPIRATORY (INHALATION)
Qty: 0 | Refills: 0 | DISCHARGE

## 2024-01-25 RX ORDER — ALBUTEROL 90 UG/1
2 AEROSOL, METERED ORAL EVERY 6 HOURS
Refills: 0 | Status: DISCONTINUED | OUTPATIENT
Start: 2024-01-25 | End: 2024-02-02

## 2024-01-25 RX ORDER — CARVEDILOL PHOSPHATE 80 MG/1
25 CAPSULE, EXTENDED RELEASE ORAL ONCE
Refills: 0 | Status: COMPLETED | OUTPATIENT
Start: 2024-01-25 | End: 2024-01-25

## 2024-01-25 RX ORDER — SEVELAMER CARBONATE 2400 MG/1
2 POWDER, FOR SUSPENSION ORAL
Qty: 0 | Refills: 1 | DISCHARGE

## 2024-01-25 RX ORDER — CINACALCET 30 MG/1
1 TABLET, FILM COATED ORAL
Qty: 0 | Refills: 2 | DISCHARGE

## 2024-01-25 RX ORDER — CIPROFLOXACIN LACTATE 400MG/40ML
400 VIAL (ML) INTRAVENOUS ONCE
Refills: 0 | Status: COMPLETED | OUTPATIENT
Start: 2024-01-25 | End: 2024-01-25

## 2024-01-25 RX ORDER — SODIUM CHLORIDE 9 MG/ML
1000 INJECTION INTRAMUSCULAR; INTRAVENOUS; SUBCUTANEOUS
Refills: 0 | Status: DISCONTINUED | OUTPATIENT
Start: 2024-01-25 | End: 2024-01-26

## 2024-01-25 RX ORDER — CLOPIDOGREL BISULFATE 75 MG/1
1 TABLET, FILM COATED ORAL
Qty: 0 | Refills: 0 | DISCHARGE

## 2024-01-25 RX ORDER — HYDROXYZINE HCL 10 MG
25 TABLET ORAL EVERY 12 HOURS
Refills: 0 | Status: DISCONTINUED | OUTPATIENT
Start: 2024-01-25 | End: 2024-02-02

## 2024-01-25 RX ADMIN — Medication 3 MILLILITER(S): at 03:18

## 2024-01-25 RX ADMIN — ERYTHROPOIETIN 4000 UNIT(S): 10000 INJECTION, SOLUTION INTRAVENOUS; SUBCUTANEOUS at 19:45

## 2024-01-25 RX ADMIN — ONDANSETRON 4 MILLIGRAM(S): 8 TABLET, FILM COATED ORAL at 03:16

## 2024-01-25 RX ADMIN — Medication 50 MILLIGRAM(S): at 09:27

## 2024-01-25 RX ADMIN — ESCITALOPRAM OXALATE 10 MILLIGRAM(S): 10 TABLET, FILM COATED ORAL at 11:38

## 2024-01-25 RX ADMIN — BUDESONIDE AND FORMOTEROL FUMARATE DIHYDRATE 2 PUFF(S): 160; 4.5 AEROSOL RESPIRATORY (INHALATION) at 10:10

## 2024-01-25 RX ADMIN — Medication 25 MILLIGRAM(S): at 21:46

## 2024-01-25 RX ADMIN — ATORVASTATIN CALCIUM 40 MILLIGRAM(S): 80 TABLET, FILM COATED ORAL at 21:48

## 2024-01-25 RX ADMIN — QUETIAPINE FUMARATE 300 MILLIGRAM(S): 200 TABLET, FILM COATED ORAL at 11:55

## 2024-01-25 RX ADMIN — Medication 50 MILLIGRAM(S): at 15:52

## 2024-01-25 RX ADMIN — BUDESONIDE AND FORMOTEROL FUMARATE DIHYDRATE 2 PUFF(S): 160; 4.5 AEROSOL RESPIRATORY (INHALATION) at 21:47

## 2024-01-25 RX ADMIN — TIOTROPIUM BROMIDE 2 PUFF(S): 18 CAPSULE ORAL; RESPIRATORY (INHALATION) at 11:36

## 2024-01-25 RX ADMIN — RISPERIDONE 2 MILLIGRAM(S): 4 TABLET ORAL at 21:44

## 2024-01-25 RX ADMIN — CALCITRIOL 0.5 MICROGRAM(S): 0.5 CAPSULE ORAL at 11:37

## 2024-01-25 RX ADMIN — CARVEDILOL PHOSPHATE 25 MILLIGRAM(S): 80 CAPSULE, EXTENDED RELEASE ORAL at 21:46

## 2024-01-25 RX ADMIN — MORPHINE SULFATE 4 MILLIGRAM(S): 50 CAPSULE, EXTENDED RELEASE ORAL at 05:33

## 2024-01-25 RX ADMIN — CINACALCET 30 MILLIGRAM(S): 30 TABLET, FILM COATED ORAL at 11:37

## 2024-01-25 RX ADMIN — SODIUM CHLORIDE 100 MILLILITER(S): 9 INJECTION INTRAMUSCULAR; INTRAVENOUS; SUBCUTANEOUS at 15:59

## 2024-01-25 RX ADMIN — PANTOPRAZOLE SODIUM 40 MILLIGRAM(S): 20 TABLET, DELAYED RELEASE ORAL at 03:17

## 2024-01-25 RX ADMIN — MIRTAZAPINE 15 MILLIGRAM(S): 45 TABLET, ORALLY DISINTEGRATING ORAL at 11:54

## 2024-01-25 RX ADMIN — HEPARIN SODIUM 5000 UNIT(S): 5000 INJECTION INTRAVENOUS; SUBCUTANEOUS at 21:44

## 2024-01-25 RX ADMIN — AMLODIPINE BESYLATE 5 MILLIGRAM(S): 2.5 TABLET ORAL at 09:27

## 2024-01-25 RX ADMIN — Medication 100 MILLIGRAM(S): at 11:54

## 2024-01-25 RX ADMIN — HALOPERIDOL DECANOATE 2 MILLIGRAM(S): 100 INJECTION INTRAMUSCULAR at 11:37

## 2024-01-25 RX ADMIN — SEVELAMER CARBONATE 800 MILLIGRAM(S): 2400 POWDER, FOR SUSPENSION ORAL at 21:44

## 2024-01-25 RX ADMIN — HEPARIN SODIUM 5000 UNIT(S): 5000 INJECTION INTRAVENOUS; SUBCUTANEOUS at 06:49

## 2024-01-25 RX ADMIN — HEPARIN SODIUM 5000 UNIT(S): 5000 INJECTION INTRAVENOUS; SUBCUTANEOUS at 15:52

## 2024-01-25 RX ADMIN — SODIUM CHLORIDE 150 MILLILITER(S): 9 INJECTION INTRAMUSCULAR; INTRAVENOUS; SUBCUTANEOUS at 12:18

## 2024-01-25 RX ADMIN — Medication 200 MILLIGRAM(S): at 03:18

## 2024-01-25 NOTE — H&P ADULT - NSHPREVIEWOFSYSTEMS_GEN_ALL_CORE
CONSTITUTIONAL: No fever, weight loss, or fatigue  RESPIRATORY: (+) SOB No cough, wheezing, chills or hemoptysis  CARDIOVASCULAR: No chest pain, palpitations, dizziness, or leg swelling  GASTROINTESTINAL: (+) N/V/D, periumbilical and epigastric abdominal pain.   GENITOURINARY: No dysuria or hematuria, urinary frequency  NEUROLOGICAL: No headaches, memory loss, loss of strength, numbness, or tremors  ENDOCRINE: No polyuria, polydipsia, or heat/cold intolerance  MUSKULOSKELETAL: No muscle aches, joint pains  HEME: no easy bruisability, no tender or enlarged lymph nodes  SKIN: No itching, burning, rashes, or lesions .

## 2024-01-25 NOTE — H&P ADULT - ASSESSMENT
43M PMH HTN, schizophrenia, COPD (4L home O2), and ESRD on HD (MWF) presenting to the ED with nausea, vomtiing and diarrhea admitted for acute pancreatitis

## 2024-01-25 NOTE — H&P ADULT - PROBLEM SELECTOR PLAN 5
h/o COPD on 4L O2 at home  saturating 99% on 3L in ED  sat target 88-92%   will continue home albuterol, Spiriva, and symbicort

## 2024-01-25 NOTE — CHART NOTE - NSCHARTNOTEFT_GEN_A_CORE
EVENT:  Pt seen and examined     SUBJECTIVE:  Denies pain.  Reports hunger, requesting to eat.  Denies diarrhea since Sunday.      OBJECTIVE  REVIEW OF SYSTEMS:    CONSTITUTIONAL: No fever  EYES: No acute visual disturbances  NECK: No pain or stiffness  RESPIRATORY: No cough; No shortness of breath  CARDIOVASCULAR: No chest pain, no palpitations  GASTROINTESTINAL: No pain. No nausea or vomiting.  No diarrhea   NEUROLOGICAL: No headache or numbness, no tremors  MUSCULOSKELETAL: No joint pain, no muscle pain  GENITOURINARY: No dysuria, no frequency, no hesitancy  PSYCHIATRY: No depression , no anxiety  ALL OTHER  ROS negative      PHYSICAL EXAM:  GENERAL: NAD  HEENT: Normocephalic;  conjunctivae and sclerae clear; moist mucous membranes;   NECK : Supple  CHEST/LUNG: Clear to auscultation bilaterally with good air entry   HEART: S1 S2  regular; no murmurs, gallops or rubs  ABDOMEN: Obese, Soft, Nontender, Nondistended; Bowel sounds present x 4 quad  EXTREMITIES: No cyanosis; no edema; no calf tenderness.  (+) R forearm HD access.    SKIN: Warm and dry; no rash  NERVOUS SYSTEM:  Awake and alert; Oriented  to place, person and time ; no new deficits      Vital Signs Last 24 Hrs  T(C): 36.7 (25 Jan 2024 14:35), Max: 37.2 (25 Jan 2024 07:36)  T(F): 98 (25 Jan 2024 14:35), Max: 99 (25 Jan 2024 07:36)  HR: 70 (25 Jan 2024 14:35) (62 - 70)  BP: 182/77 (25 Jan 2024 14:35) (163/83 - 187/95)  RR: 17 (25 Jan 2024 14:35) (17 - 20)  SpO2: 97% (25 Jan 2024 14:35) (96% - 99%)    Parameters below as of 25 Jan 2024 14:35  Patient On (Oxygen Delivery Method): nasal cannula  O2 Flow (L/min): 2      LABS:                        8.8    7.25  )-----------( 146      ( 25 Jan 2024 09:25 )             26.7     01-25    130<L>  |  94<L>  |  122<H>  ----------------------------<  91  4.7   |  36<H>  |  11.50<H>    Ca    11.5<H>      25 Jan 2024 09:25  Phos  8.0     01-25  Mg     2.9     01-25    TPro  8.3  /  Alb  3.4<L>  /  TBili  0.4  /  DBili  x   /  AST  74<H>  /  ALT  102<H>  /  AlkPhos  100  01-25      EKG:   IMAGING:    ASSESSMENT:  43 year old, Male, from home w/ PMH of HTN, schizophrenia, COPD (4L home O2), and ESRD on HD (MWF).  Presenting to the ED with nausea, vomiting and diarrhea.  Admitted for Acute pancreatitis and missed HD.     Problem/Plan - 1:  ·  Problem: Cholelithiasis.   ·  Plan:  - Per CT A/P  - Sx consult pending-f/u recommendations      Problem/Plan - 2:  ·  Problem: Acute pancreatitis.   ·  Plan:  - P/w Nausea, vomiting, and diarrhea x 1 week  - On UT exam: umbilical, epigastric and RUQ abdominal regions TTP on admission  - S/p Lipase=304  - unknown etiology, possibly 2/2 gallstones  - Per CT A/P   - Resumed IVF per Nephro  - Diet advanced to clears   - C/w Pain mgmt     Problem/Plan - 3:  ·  Problem: ESRD on dialysis.   ·  Plan:   H/o ESRD on HD MWF  - Missed HD session on Monday (only completed 3 hours due to GI symptoms) and missed his HD on Wednesday  - Nephro-Dr. La, consult pending-f/u results      Problem/Plan - 4:  ·  Problem: HTN (hypertension).   ·  Plan: h  H/o HTN on Coreg, Hydralazine, Lasix and Amlodipine  - DC'd Lasix, pt anuric    - C/w Coreg, Hydralazine and Amlodipine   - Continue to monitor BP     Problem/Plan - 5:  ·  Problem: Schizophrenia.   ·  Plan:   H/o Schizophrenia on Trazodone, Hydroxyzine, Mirtazapine, Risperidone, Escitalopram and Quetiapine  - C/w Trazodone, Hydroxyzine, Mirtazapine, Risperidone, Escitalopram and Quetiapine     Problem/Plan - 6:  ·  Problem: COPD (chronic obstructive pulmonary disease).   ·  Plan:   H/o COPD on home oxygen-4L   - C/w Oxygen per home   - C/w Albuterol, Spiriva, and Symbicort     Problem/Plan - 7:  ·  Problem: Prophylactic measure.   ·  Plan:  - C/w Heparin for DVT ppx

## 2024-01-25 NOTE — ED PROVIDER NOTE - PHYSICAL EXAMINATION
General: Well appearing, no acute distress, appears stated age  HEENT: normocephalic, atraumatic   Respiratory: normal work of breathing  MSK: no swelling or tenderness of lower extremities, moving all extremities spontaneously   Skin: warm, dry  Neuro: A&Ox3, cranial nerves II-XII intact, 5/5 strength in all extremities, no sensory deficits, normal gait   Psych: appropriate affect  ABD: soft, tender epigastrum, nondistended, no guarding, no rebound, no CVA tenderness

## 2024-01-25 NOTE — ED ADULT NURSE NOTE - ED STAT RN HANDOFF DETAILS
BP medication administered to patient (refer to MAR), as per NP Ross pt to be given BP meds and receiving RN may recheck BP. Patient stable and able to communicate in full complete sentences w/ unlabored breathing noted. Morning labs were sent before transporting patient to holding unit in the ER. IV intact.

## 2024-01-25 NOTE — CONSULT NOTE ADULT - ASSESSMENT
# ESRD.  HD MWF. MIisses HD yesterday. In light of acute pancreatitis, will dialysed with minimal UF.  # acute pancreatitis. Isotonic fluids. pain mx. diet per Gastroenterologist.  # anemia of CKD epogen on hemodialysis  # RENAL OSTEODYSTROPHY. PHOS IS HIGH.  RENVELA WHEN ON A DIET. IN LIGHT OF N&V D/C SENSIPAR.  # HTN. blood pressure high in light of pain. re-eval after HD . cont BP meds.

## 2024-01-25 NOTE — CONSULT NOTE ADULT - SUBJECTIVE AND OBJECTIVE BOX
[facility] : Progress Note :: [phone], (office [phone]),   Dr La / Dr Hui / Dr Barber / Dr Villalobos / Dr Hang CASTELLANO / Dr Mehta / Dr Sanchez / Dr Alber dumont  _____________________________________________________________________________________________  Patient is a 43y Male whom presented to the hospital with nausea , vomitting  and post-prandial abdominal pain.  He has ESRD on HD MWF at [facility], [facility] HD Monday-cut HD time as was with abdominal pain.  In ED CT scan revealed acute pancreatitis and pericholecystic  fluid    PAST MEDICAL & SURGICAL HISTORY:  Morbid obesity with BMI of 40.0-44.9, adult      ESRD on dialysis      Bipolar disorder      DM (diabetes mellitus)      HTN (hypertension)      Migraine      COPD (chronic obstructive pulmonary disease)      Renal failure      Asthma with COPD      Diabetes mellitus, type 2      Hypertension      Schizophrenia      Anxiety      Schizophrenia      COPD (chronic obstructive pulmonary disease)      HTN (hypertension)      ESRD on dialysis      HLD (hyperlipidemia)      H/O hernia repair      S/P arteriovenous (AV) fistula creation  right side        shellfish (Anaphylaxis)  shellfish (Rash)  penicillins (Swelling)  fish (Unknown)  penicillin (Anaphylaxis)  Seafood (Hives)  aspirin (Swelling)    Home Medications Reviewed  Hospital Medications:   MEDICATIONS  (STANDING):  amLODIPine   Tablet 5 milliGRAM(s) Oral daily  atorvastatin 40 milliGRAM(s) Oral at bedtime  budesonide 160 MICROgram(s)/formoterol 4.5 MICROgram(s) Inhaler 2 Puff(s) Inhalation two times a day  calcitriol   Capsule 0.5 MICROGram(s) Oral daily  carvedilol 25 milliGRAM(s) Oral every 12 hours  cinacalcet 30 milliGRAM(s) Oral daily  epoetin cyndie (PROCRIT) Injectable 4000 Unit(s) IV Push <User Schedule>  escitalopram 10 milliGRAM(s) Oral daily  haloperidol     Tablet 2 milliGRAM(s) Oral daily  heparin   Injectable 5000 Unit(s) SubCutaneous every 8 hours  hydrALAZINE 50 milliGRAM(s) Oral daily  hydrOXYzine hydrochloride 25 milliGRAM(s) Oral every 12 hours  mirtazapine 15 milliGRAM(s) Oral daily  QUEtiapine 300 milliGRAM(s) Oral daily  risperiDONE   Tablet 2 milliGRAM(s) Oral two times a day  sevelamer carbonate 800 milliGRAM(s) Oral three times a day  sodium chloride 0.9%. 1000 milliLiter(s) (100 mL/Hr) IV Continuous <Continuous>  tiotropium 2.5 MICROgram(s) Inhaler 2 Puff(s) Inhalation daily  traZODone 100 milliGRAM(s) Oral daily    SOCIAL HISTORY:  Denies ETOh,Smoking,   FAMILY HISTORY:  FH: lymphoma (Sibling)    FH: rheumatoid arthritis (Sibling)          VITALS:  T(F): 98 (01-25-24 @ 14:35), Max: 99 (01-25-24 @ 07:36)  HR: 70 (01-25-24 @ 14:35)  BP: 182/77 (01-25-24 @ 14:35)  RR: 17 (01-25-24 @ 14:35)  SpO2: 97% (01-25-24 @ 14:35)  Wt(kg): --    Height (cm): 200.7 (01-24 @ 23:01)  PHYSICAL EXAM:  Constitutional: NAD  HEENT: anicteric sclera, oropharynx clear.  Neck: No JVD  Respiratory: CTAB, no wheezes, rales or rhonchi  Cardiovascular: S1, S2, RRR  Gastrointestinal: BS+, soft, NT/ND  Extremities:  No peripheral edema  Neurological: A/O x 3, no focal deficits.  : No CVA tenderness. No hernandez.   Skin: No rashes  Vascular Access: LUEAVF with thrill and bruit     LABS:  01-25    130<L>  |  94<L>  |  122<H>  ----------------------------<  91  4.7   |  36<H>  |  11.50<H>    Ca    11.5<H>      25 Jan 2024 09:25  Phos  8.0     01-25  Mg     2.9     01-25    TPro  8.3  /  Alb  3.4<L>  /  TBili  0.4  /  DBili      /  AST  74<H>  /  ALT  102<H>  /  AlkPhos  100  01-25    Creatinine Trend: 11.50 <--, 11.30 <--                        8.8    7.25  )-----------( 146      ( 25 Jan 2024 09:25 )             26.7     Urine Studies:  Urinalysis Basic - ( 25 Jan 2024 09:25 )    Color:  / Appearance:  / SG:  / pH:   Gluc: 91 mg/dL / Ketone:   / Bili:  / Urobili:    Blood:  / Protein:  / Nitrite:    Leuk Esterase:  / RBC:  / WBC    Sq Epi:  / Non Sq Epi:  / Bacteria:         RADIOLOGY & ADDITIONAL STUDIES:

## 2024-01-25 NOTE — H&P ADULT - PROBLEM SELECTOR PLAN 4
h/o schizophrenia  takes trazodone, hydroxyzine, mirtazapine, risperidone, escitalopram and quetiapine at home  will resume home meds

## 2024-01-25 NOTE — ED ADULT NURSE REASSESSMENT NOTE - NS ED NURSE REASSESS COMMENT FT1
Pt received from DANIEL Cleary, pt able to communicate in full complete sentences w/ unlabored breathing noted; pt on 2L of NC for comfort. Pt ambulatory to self with steady gait. Pt explained wait time for admitting unit and also lab draw required for the morning. Pt verbalized understanding and asked for ice chips, reinforcement of NPO status reiterated. NAD noted at time of assessment.

## 2024-01-25 NOTE — ED PROVIDER NOTE - CLINICAL SUMMARY MEDICAL DECISION MAKING FREE TEXT BOX
43-year-old male with severe abdominal pain vomiting.  Will CT scan abdomen for check lipase and LFTs.  Concerning for excessive vomiting diarrhea patient may have worsening of his kidney function… Hypokalemia… However he is not short of breath so potentially no need for emergent dialysis.  Will check electrolytes.

## 2024-01-25 NOTE — H&P ADULT - ATTENDING COMMENTS
IMAGING  EKG  Normal Sinus Rhythm, 61 bpm, QTc 430ms, NV 212ms, on my read no ST segment elevation or depression, TWI in V1 chronic when compared to ekg from 11/2023    HPI  43 year old male patient with pmhx HTN, schizophrenia, bipolar, COPD (on 4L home O2), and ESRD on HD (MWF) who presented to the ER due to 1 week of nausea, vomiting, watery diarrhea, generalized weakness, inability to tolerate oral intake.  The patient also has periumbilical abdominal pain when eating that radiates to his epigastric region but the abdominal pain resolves after vomiting.    Review Of Systems included: + nausea, + vomiting after eating, + watery diarrhea after eating, + abdominal pain after eating (resolves with vomiting), + cough with pinkish sputum, chronic shortness of breath for years unchanged and not worse than baseline,   no yellow or green sputum production, no fever, no chills, no injuries to his abdomen, no bloody stool, no black stool    Physical Exam  General: Awake, Alert, Oriented, No acute distress, Flat affect  Cardiac: RRR  Pulmonary: CTA b/l  Abdominal: Soft, High BMI, Tender to palpation or RUQ, RLQ, epigastric region, suprapubic region, + bowel sounds  Extremities: 1+ lower extremity edema b/l, right wrist fistula with thrill    A/P  # Abdominal Pain possibly 2/2 Pancreatitis  # Severe Nausea and Vomiting with Inability to Tolerate Oral Intake  # Watery Diarrhea  Lipase of 304 suggests pancreatitis, but abdominal pain is only with eating; currently no abdominal pain  - CT A/P to evaluate for pancreatitis  - Will hold off on IV fluids as pancreatitis diagnosis is uncertain based on presentation and patient has ESRD with missed dialysis session  - Zofran prn  - Stool PCR and Culture  - NPO, advance as tolerated    # Hx of ESRD on HD  Missed dialysis Wednesday due to feeling sick; last dialysis was on Monday for 3 hours  Patient does not make urine  - Consult Nephrology    # Hx of HTN  - Continue home medications Coreg, Hydralazine, Lasix, Amlodipine with hold parameters    # Hx of Schizophrenia  - Continue home medications Trazodone, Mirtazapine, Risperidone, Escitalopram, Quetiapine    # Hx of COPD on 4 lpm at home  - Supplemental O2 as needed  - Continue home medication inhalers Albuterol, Spiriva, Symbicort    # DVT PPx  - Heparin    # FEN  - NPO, advance as tolerated  - Monitor and replete electrolytes as needed  - Avoid standing fluids as patient has ESRD    Previous Admissions Included  11/14/2023: ER Visit for missed dialysis  11/13/2023: ER Visit for Abdominal pain, Nausea, Vomiting, CT A/P with hepatosplenomegaly possibly cirrhosis. Instructed to follow up with GI  2/24/2023 - 2/26/2023: Cellulitis on Right Lower Extremity  11/20/2022 - 12/9/2022: Severe Sepsis, Infected AV Graft. MSSA Bacteremia. s/p LUE AV Graft Excision 11/23 and s/p ALBINO without vegetation    Patient case and management was discussed with ER Attending  I did examine all labs and imaging IMAGING  EKG  Normal Sinus Rhythm, 61 bpm, QTc 430ms, ID 212ms, on my read no ST segment elevation or depression, TWI in V1 chronic when compared to ekg from 11/2023    HPI  43 year old male patient with pmhx HTN, schizophrenia, bipolar, COPD (on 4L home O2), and ESRD on HD (MWF) who presented to the ER due to 1 week of nausea, vomiting, watery diarrhea, generalized weakness, inability to tolerate oral intake.  The patient also has periumbilical abdominal pain when eating that radiates to his epigastric region but the abdominal pain resolves after vomiting.    Review Of Systems included: + nausea, + vomiting after eating, + watery diarrhea after eating, + abdominal pain after eating (resolves with vomiting), + cough with pinkish sputum, chronic shortness of breath for years unchanged and not worse than baseline,   no yellow or green sputum production, no fever, no chills, no injuries to his abdomen, no bloody stool, no black stool    Physical Exam  General: Awake, Alert, Oriented, No acute distress, Flat affect  Cardiac: RRR  Pulmonary: CTA b/l  Abdominal: Soft, High BMI, Tender to palpation or RUQ, RLQ, epigastric region, suprapubic region, + bowel sounds  Extremities: 1+ lower extremity edema b/l, right wrist fistula with thrill    A/P  # Abdominal Pain possibly 2/2 Pancreatitis  # Severe Nausea and Vomiting with Inability to Tolerate Oral Intake  # Watery Diarrhea  Lipase of 304 suggests pancreatitis, but abdominal pain is only with eating; currently no abdominal pain  - CT A/P to evaluate for pancreatitis  - Will hold off on IV fluids as pancreatitis diagnosis is uncertain based on presentation and patient has ESRD with missed dialysis session  - Zofran prn  - Stool PCR and Culture  - NPO, advance as tolerated    # Hx of ESRD on HD  Missed dialysis Wednesday due to feeling sick; last dialysis was on Monday for 3 hours  Patient does not make urine  - Consult Nephrology    # Hx of HTN  - Continue home medications Coreg, Hydralazine, Lasix, Amlodipine with hold parameters    # Hx of Schizophrenia  - Continue home medications Trazodone, Mirtazapine, Risperidone, Escitalopram, Quetiapine    # Hx of COPD on 4 lpm at home  Patient denies ever needing a CPAP for COPD or OHS  - Supplemental O2 as needed  - Continue home medication inhalers Albuterol, Spiriva, Symbicort    # DVT PPx  - Heparin    # FEN  - NPO, advance as tolerated  - Monitor and replete electrolytes as needed  - Avoid standing fluids as patient has ESRD    Previous Admissions Included  11/14/2023: ER Visit for missed dialysis  11/13/2023: ER Visit for Abdominal pain, Nausea, Vomiting, CT A/P with hepatosplenomegaly possibly cirrhosis. Instructed to follow up with GI  2/24/2023 - 2/26/2023: Cellulitis on Right Lower Extremity  11/20/2022 - 12/9/2022: Severe Sepsis, Infected AV Graft. MSSA Bacteremia. s/p LUE AV Graft Excision 11/23 and s/p ALBINO without vegetation    Patient case and management was discussed with ER Attending  I did examine all labs and imaging

## 2024-01-25 NOTE — H&P ADULT - PROBLEM SELECTOR PLAN 2
h/o ESRD on HD MWF  last HD session on Monday (only completed 3 hours due to GI symptoms) and missed his HD on Wednesday  follows Dr. La, will consult to resume HD

## 2024-01-25 NOTE — ED PROVIDER NOTE - OBJECTIVE STATEMENT
43-year-old male with end-stage renal disease presents with abdominal pain and nausea vomiting.  Patient states every time he eats he has to vomit.  He has got epigastric abdominal pain.  Denies history of alcoholism or gallstones.  He had so much vomiting in the last few days that he did not go to dialysis today and only did a short course on Monday of 3 hours.

## 2024-01-25 NOTE — CHART NOTE - NSCHARTNOTEFT_GEN_A_CORE
EVENT: NP notified by RN /77          Vital Signs Last 24 Hrs  T(C): 36.8 (25 Jan 2024 16:39), Max: 37.2 (25 Jan 2024 07:36)  T(F): 98.2 (25 Jan 2024 16:39), Max: 99 (25 Jan 2024 07:36)  HR: 68 (25 Jan 2024 16:39) (62 - 70)  BP: 165/96 (25 Jan 2024 16:39) (163/83 - 187/95)  RR: 16 (25 Jan 2024 16:39) (16 - 20)  SpO2: 97% (25 Jan 2024 16:39) (96% - 99%)    Parameters below as of 25 Jan 2024 16:39  Patient On (Oxygen Delivery Method): nasal cannula  O2 Flow (L/min): 4      LABS:                        8.8    7.25  )-----------( 146      ( 25 Jan 2024 09:25 )             26.7     01-25    130<L>  |  94<L>  |  122<H>  ----------------------------<  91  4.7   |  36<H>  |  11.50<H>    Ca    11.5<H>      25 Jan 2024 09:25  Phos  8.0     01-25  Mg     2.9     01-25    TPro  8.3  /  Alb  3.4<L>  /  TBili  0.4  /  DBili  x   /  AST  74<H>  /  ALT  102<H>  /  AlkPhos  100  01-25      EKG:   IMAGING:    ASSESSMENT:  HPI:  43 year old, Male, from home w/ PMH of HTN, schizophrenia, COPD (4L home O2), and ESRD on HD (MWF).  Presenting to the ED with nausea, vomiting and diarrhea.  Admitted for Acute pancreatitis and missed HD.      PLAN:     - Hydralazine x I given    - Pt for HD today    - Continue to monitor BP and adjust accordingly

## 2024-01-25 NOTE — ED ADULT NURSE REASSESSMENT NOTE - NSFALLUNIVINTERV_ED_ALL_ED
Assistance OOB with selected safe patient handling equipment if applicable/Assistance with ambulation/Communicate risk of Fall with Harm to all staff, patient, and family/Encourage patient to sit up slowly, dangle for a short time, stand at bedside before walking/Monitor gait and stability/Monitor for mental status changes and reorient to person, place, and time, as needed/Move patient closer to nursing station/within visual sight of ED staff/Provide patient with walking aids/Provide visual cue: red socks, yellow wristband, yellow gown, etc/Reinforce activity limits and safety measures with patient and family/Review medications for side effects contributing to fall risk/Toileting schedule using arm's reach rule for commode and bathroom/Use of alarms - bed, stretcher, chair and/or video monitoring/Bed/Stretcher in lowest position, wheels locked, appropriate side rails in place/Call bell, personal items and telephone in reach/Instruct patient to call for assistance before getting out of bed/chair/stretcher/Non-slip footwear applied when patient is off stretcher/[place] to call system/Physically safe environment - no spills, clutter or unnecessary equipment/Purposeful proactive rounding/Room/bathroom lighting operational, light cord in reach

## 2024-01-25 NOTE — CONSULT NOTE ADULT - ASSESSMENT
44 y/o male a/w gallstone panc   VSS, afebrile, lipase 304, minimally elevated ALT/AST, no leukocytosis    Plan   - obtain RUQ US  - keep NPO  - IV ABX  - IVF when clinically safe  - HD as per nephrology    - pain / nausea control prn  42 y/o male a/w gallstone panc   VSS, afebrile, lipase 304, minimally elevated ALT/AST, no leukocytosis    Plan   - lap javier planning next week  - keep NPO  - IV ABX  - IVF when clinically safe  - HD as per nephrology    - pain / nausea control prn

## 2024-01-25 NOTE — H&P ADULT - PROBLEM SELECTOR PLAN 3
h/o HTN  takse coreg, hydralazine, lasix and amlodipine  will stop lasix as pt no longer makes urine with ESRD  will resume coreg, hydralazind and amlodipine with parameters  monitor BP

## 2024-01-25 NOTE — ED ADULT NURSE NOTE - OBJECTIVE STATEMENT
Received pt with c/o generalized weakness, nausea, productive cough with pink-tinged sputum. A&Ox4. Skin warm and dry. Noted AVF to right arm. Hx of ESRD MWF. Pt reported he did get dialyzed on Wednesday d/t not feeling well. IV heplock inserted. Meds given as ordered. Labs collected. EKG done. NAD at this time.

## 2024-01-25 NOTE — ED ADULT NURSE NOTE - NSFALLUNIVINTERV_ED_ALL_ED
Bed/Stretcher in lowest position, wheels locked, appropriate side rails in place/Call bell, personal items and telephone in reach/Instruct patient to call for assistance before getting out of bed/chair/stretcher/Non-slip footwear applied when patient is off stretcher/[place] to call system/Physically safe environment - no spills, clutter or unnecessary equipment/Purposeful proactive rounding/Room/bathroom lighting operational, light cord in reach

## 2024-01-25 NOTE — H&P ADULT - PROBLEM SELECTOR PLAN 1
p/w with nausea, vomiting, and diarrhea x 1 week  no recent travel, no foods or sick contacts  umbilical, epigastric and RUQ abdominal regions TTP on admission  lipase 304  unknown etiology, possibly 2/2 gallstones  f/u CTAP   will hold off on IVF for now due to missed HD   NPO, advance as tolerated  pain control, tylenol for mild, 0,25 dilaudid q6 for moderate and 0.5 dilaudid q6 for severe pain

## 2024-01-25 NOTE — PHARMACOTHERAPY INTERVENTION NOTE - COMMENTS
Contacted patient's pharmacy of record, [facility] at [address] [facility], [place] [phone].     The following prescriptions have been filled there and were updated based on their prescription history:  ·	Amlodipine  ·	HydrALAzine  ·	Carvedilol  ·	Escitalopram  ·	Trazodone  ·	Seroquel  ·	Fluphenazine  ·	Atorvastatin    [facility] at [address] [facility], [place] [phone] was also contacted but reports no maintenance medications filled.     Contacted patient's pharmacy of record, [facility] at [address] [facility], [place] [phone].     The following prescriptions have been filled there and were updated based on their prescription history:  ·	Amlodipine  ·	HydrALAzine  ·	Carvedilol  ·	Escitalopram  ·	Trazodone  ·	Seroquel  ·	Fluphenazine  ·	Atorvastatin    [facility] at [address] [facility], [place] [phone] was also contacted but reports no maintenance medications filled.    Confirmed with patient medications are filled at [facility] and some neurology medications are delivered. Patient's mother was contacted by phone and will bring the medications in tomorrow for reconciliation.     Contacted patient's pharmacy of record, [facility] at [address] [facility], [place] [phone].     The following prescriptions have been filled there and were updated based on their prescription history:  ·	Amlodipine  ·	HydrALAzine  ·	Carvedilol  ·	Escitalopram  ·	Trazodone  ·	Seroquel  ·	Fluphenazine  ·	Atorvastatin    [facility] at [address] [facility], [place] [phone] was also contacted but reports no maintenance medications filled.    Confirmed with patient medications are filled at [facility] and some neurology medications are delivered. Patient's mother was contacted by phone and will bring the medications in tomorrow for reconciliation.

## 2024-01-25 NOTE — H&P ADULT - HISTORY OF PRESENT ILLNESS
43M PMH HTN, schizophrenia, COPD (4L home O2), and ESRD on HD (MWF) presenting to the ED with nausea, vomtiing and diarrhea. Pt states that his symptoms started 1 week ago and he would have nausea and vomiting every time he tries to ingest any food. He has had > 5 episodes of NBNB vomiting and will have nonbloody watery loose stool after each episode. He denies trying any new foods, sick contacts or recent travel. He also reports sharp abdominal pain immediately after he tries to eat, located in the periumbilical and epigastric region, radiating to his chest, intermittent 10/10. Due to his symptoms, he was only able to complete 3 hours of his HD on Monday and missed his HD on Wednesday. He has been having some SOB and generalized swelling due to his missed HD. He denies any fevers, chills, constipation. numbness/tingling/weakness in extremities. He lives at home with his mother and ambulates independently

## 2024-01-25 NOTE — H&P ADULT - NSHPPHYSICALEXAM_GEN_ALL_CORE
General - NAD, lyinging in bed, obese  Eyes - PERRLA, EOM intact  ENT - Nonicteric sclerae, PERRLA, EOMI. Oropharynx clear. Dry mucous membranes. Conjunctivae appear well perfused.   Neck - No noticeable or palpable swelling, redness or rash around throat or on face  Lymph Nodes - No lymphadenopathy  Cardiovascular - RRR no m/r/g, no JVD, no carotid bruits  Lungs - (+) decreased breath sounds b/l. No use of accessory muscles, no crackles or wheezes.  Skin - (+) RUE fistula. No rashes, skin warm and dry, no erythematous areas  Abdomen - (+) umbilical, epigastric and RUQ tender to palpation.  Normal bowel sounds, abdomen soft  Rectal – Rectal exam not performed since no symptoms indicated blood loss.  Extremities - No edema, cyanosis or clubbing  Musculoskeletal - 5/5 strength, normal range of motion, no swollen or erythematous joints.  Neuro– Alert and oriented x 3, CN 2-12 grossly intact.

## 2024-01-25 NOTE — CONSULT NOTE ADULT - SUBJECTIVE AND OBJECTIVE BOX
GENERAL SURGERY CONSULT   42 y/o male with HTN, schizophrenia, COPD (4L home O2), and ESRD on HD (MWF) presenting to the ED with nausea, vomtiing and diarrhea. Pt states that his symptoms started 1 week ago and he would have nausea and vomiting every time he tries to ingest any food. He has had > 5 episodes of NBNB vomiting and will have nonbloody watery loose stool after each episode. He denies trying any new foods, sick contacts or recent travel. He also reports sharp abdominal pain immediately after he tries to eat, located in the periumbilical and epigastric region, radiating to his chest, intermittent 10/10. Due to his symptoms, he was only able to complete 3 hours of his HD on Monday and missed his HD on Wednesday. He has been having some SOB and generalized swelling due to his missed HD. He denies any fevers, chills, constipation. numbness/tingling/weakness in extremities. He lives at home with his mother and ambulates independently  (25 Jan 2024 06:19)      GENERAL SURGERY CONSULT   42 y/o male with obesity, ESRD (on HD MWF), COPD, schizophrenia, bipolar, HTN admitted to medicine with suspected gallstone pancreatitis. General surgery consulted. Pt seen and examined at bedside. Pt admits to     PAST MEDICAL & SURGICAL HISTORY:  Morbid obesity with BMI of 40.0-44.9, adult  ESRD on dialysis  Bipolar disorder  DM (diabetes mellitus)  HTN (hypertension)  Migraine  COPD (chronic obstructive pulmonary disease)  Renal failure  Asthma with COPD  Diabetes mellitus, type 2  Hypertension  Schizophrenia  Anxiety  Schizophrenia  COPD (chronic obstructive pulmonary disease)  HTN (hypertension)  ESRD on dialysis  HLD (hyperlipidemia)  H/O hernia repair  S/P arteriovenous (AV) fistula creation  right side          MEDICATIONS  (STANDING):  amLODIPine   Tablet 5 milliGRAM(s) Oral daily  atorvastatin 40 milliGRAM(s) Oral at bedtime  budesonide 160 MICROgram(s)/formoterol 4.5 MICROgram(s) Inhaler 2 Puff(s) Inhalation two times a day  calcitriol   Capsule 0.5 MICROGram(s) Oral daily  carvedilol 25 milliGRAM(s) Oral every 12 hours  chlorhexidine 2% Cloths 1 Application(s) Topical daily  cinacalcet 30 milliGRAM(s) Oral daily  epoetin cyndie (PROCRIT) Injectable 4000 Unit(s) IV Push <User Schedule>  escitalopram 10 milliGRAM(s) Oral daily  haloperidol     Tablet 2 milliGRAM(s) Oral daily  heparin   Injectable 5000 Unit(s) SubCutaneous every 8 hours  hydrALAZINE 50 milliGRAM(s) Oral daily  hydrOXYzine hydrochloride 25 milliGRAM(s) Oral every 12 hours  mirtazapine 15 milliGRAM(s) Oral daily  QUEtiapine 300 milliGRAM(s) Oral daily  risperiDONE   Tablet 2 milliGRAM(s) Oral two times a day  sevelamer carbonate 800 milliGRAM(s) Oral three times a day  sodium chloride 0.9%. 1000 milliLiter(s) (100 mL/Hr) IV Continuous <Continuous>  tiotropium 2.5 MICROgram(s) Inhaler 2 Puff(s) Inhalation daily  traZODone 100 milliGRAM(s) Oral daily    MEDICATIONS  (PRN):  acetaminophen     Tablet .. 650 milliGRAM(s) Oral every 6 hours PRN Temp greater or equal to 38C (100.4F), Mild Pain (1 - 3)  albuterol    90 MICROgram(s) HFA Inhaler 2 Puff(s) Inhalation every 6 hours PRN Shortness of Breath and/or Wheezing  aluminum hydroxide/magnesium hydroxide/simethicone Suspension 30 milliLiter(s) Oral every 4 hours PRN Dyspepsia  HYDROmorphone  Injectable 0.25 milliGRAM(s) IV Push every 6 hours PRN Moderate Pain (4 - 6)  HYDROmorphone  Injectable 0.5 milliGRAM(s) IV Push every 6 hours PRN Severe Pain (7 - 10)  melatonin 3 milliGRAM(s) Oral at bedtime PRN Insomnia  ondansetron Injectable 4 milliGRAM(s) IV Push every 8 hours PRN Nausea and/or Vomiting      Allergies    shellfish (Anaphylaxis)  shellfish (Rash)  penicillins (Swelling)  fish (Unknown)  penicillin (Anaphylaxis)  Seafood (Hives)  aspirin (Swelling)    Intolerances        Vital Signs Last 24 Hrs  T(C): 36.7 (25 Jan 2024 14:35), Max: 37.2 (25 Jan 2024 07:36)  T(F): 98 (25 Jan 2024 14:35), Max: 99 (25 Jan 2024 07:36)  HR: 70 (25 Jan 2024 14:35) (62 - 70)  BP: 182/77 (25 Jan 2024 14:35) (163/83 - 187/95)  BP(mean): --  RR: 17 (25 Jan 2024 14:35) (17 - 20)  SpO2: 97% (25 Jan 2024 14:35) (96% - 99%)    Parameters below as of 25 Jan 2024 14:35  Patient On (Oxygen Delivery Method): nasal cannula  O2 Flow (L/min): 2      Physical:  Gen: A&Ox3. NAD  Abd: Soft ND, NT        I&O's Detail      LABS:                        8.8    7.25  )-----------( 146      ( 25 Jan 2024 09:25 )             26.7              01-25    130<L>  |  94<L>  |  122<H>  ----------------------------<  91  4.7   |  36<H>  |  11.50<H>    Ca    11.5<H>      25 Jan 2024 09:25  Phos  8.0     01-25  Mg     2.9     01-25    TPro  8.3  /  Alb  3.4<L>  /  TBili  0.4  /  DBili  x   /  AST  74<H>  /  ALT  102<H>  /  AlkPhos  100  01-25              Urinalysis Basic - ( 25 Jan 2024 09:25 )    Color: x / Appearance: x / SG: x / pH: x  Gluc: 91 mg/dL / Ketone: x  / Bili: x / Urobili: x   Blood: x / Protein: x / Nitrite: x   Leuk Esterase: x / RBC: x / WBC x   Sq Epi: x / Non Sq Epi: x / Bacteria: x        RADIOLOGY & ADDITIONAL STUDIES:    43y.o. Male with GENERAL SURGERY CONSULT   44 y/o male with obesity, ESRD (on HD MWF), COPD, schizophrenia, bipolar, HTN admitted to medicine with suspected gallstone pancreatitis. General surgery consulted. Pt seen and examined at bedside. Pt admits to vague abdominal pain mostly on the right side starting 2 days ago associated with nausea, 1 episode of NBNB vomiting. States pain is worse with eating. Pt is on dialysis, AVF creation > 2 years ago. Pt admits to missing dialysis on Wednesday due to his abdominal symptoms. States he was aware of his gallstones, did not follow up for surgical intervention. Denies fever, chills. Denies alcohol use. States he had one hernia repair in the past- unable to remember what kind of hernia and when. Denies smoking and use of anticoagulation.     PAST MEDICAL & SURGICAL HISTORY:  Morbid obesity with BMI of 40.0-44.9, adult  ESRD on dialysis  Bipolar disorder  DM (diabetes mellitus)  HTN (hypertension)  Migraine  COPD (chronic obstructive pulmonary disease)  Renal failure  Asthma with COPD  Diabetes mellitus, type 2  Hypertension  Schizophrenia  Anxiety  Schizophrenia  COPD (chronic obstructive pulmonary disease)  HTN (hypertension)  ESRD on dialysis  HLD (hyperlipidemia)  H/O hernia repair  S/P arteriovenous (AV) fistula creation  right side          MEDICATIONS  (STANDING):  amLODIPine   Tablet 5 milliGRAM(s) Oral daily  atorvastatin 40 milliGRAM(s) Oral at bedtime  budesonide 160 MICROgram(s)/formoterol 4.5 MICROgram(s) Inhaler 2 Puff(s) Inhalation two times a day  calcitriol   Capsule 0.5 MICROGram(s) Oral daily  carvedilol 25 milliGRAM(s) Oral every 12 hours  chlorhexidine 2% Cloths 1 Application(s) Topical daily  cinacalcet 30 milliGRAM(s) Oral daily  epoetin cyndie (PROCRIT) Injectable 4000 Unit(s) IV Push <User Schedule>  escitalopram 10 milliGRAM(s) Oral daily  haloperidol     Tablet 2 milliGRAM(s) Oral daily  heparin   Injectable 5000 Unit(s) SubCutaneous every 8 hours  hydrALAZINE 50 milliGRAM(s) Oral daily  hydrOXYzine hydrochloride 25 milliGRAM(s) Oral every 12 hours  mirtazapine 15 milliGRAM(s) Oral daily  QUEtiapine 300 milliGRAM(s) Oral daily  risperiDONE   Tablet 2 milliGRAM(s) Oral two times a day  sevelamer carbonate 800 milliGRAM(s) Oral three times a day  sodium chloride 0.9%. 1000 milliLiter(s) (100 mL/Hr) IV Continuous <Continuous>  tiotropium 2.5 MICROgram(s) Inhaler 2 Puff(s) Inhalation daily  traZODone 100 milliGRAM(s) Oral daily    MEDICATIONS  (PRN):  acetaminophen     Tablet .. 650 milliGRAM(s) Oral every 6 hours PRN Temp greater or equal to 38C (100.4F), Mild Pain (1 - 3)  albuterol    90 MICROgram(s) HFA Inhaler 2 Puff(s) Inhalation every 6 hours PRN Shortness of Breath and/or Wheezing  aluminum hydroxide/magnesium hydroxide/simethicone Suspension 30 milliLiter(s) Oral every 4 hours PRN Dyspepsia  HYDROmorphone  Injectable 0.25 milliGRAM(s) IV Push every 6 hours PRN Moderate Pain (4 - 6)  HYDROmorphone  Injectable 0.5 milliGRAM(s) IV Push every 6 hours PRN Severe Pain (7 - 10)  melatonin 3 milliGRAM(s) Oral at bedtime PRN Insomnia  ondansetron Injectable 4 milliGRAM(s) IV Push every 8 hours PRN Nausea and/or Vomiting      Allergies    shellfish (Anaphylaxis)  shellfish (Rash)  penicillins (Swelling)  fish (Unknown)  penicillin (Anaphylaxis)  Seafood (Hives)  aspirin (Swelling)    Intolerances        Vital Signs Last 24 Hrs  T(C): 36.7 (25 Jan 2024 14:35), Max: 37.2 (25 Jan 2024 07:36)  T(F): 98 (25 Jan 2024 14:35), Max: 99 (25 Jan 2024 07:36)  HR: 70 (25 Jan 2024 14:35) (62 - 70)  BP: 182/77 (25 Jan 2024 14:35) (163/83 - 187/95)  BP(mean): --  RR: 17 (25 Jan 2024 14:35) (17 - 20)  SpO2: 97% (25 Jan 2024 14:35) (96% - 99%)    Parameters below as of 25 Jan 2024 14:35  Patient On (Oxygen Delivery Method): nasal cannula  O2 Flow (L/min): 2      Physical:  Gen: A&Ox3. NAD. Appears comfortable.  Resp: Unlabored breathing. Equal chest rise bilaterally. On 2L NC.  Abd: Obese, Soft ND, nontender to palpation diffusely. c/o RUQ pain. No voluntary guarding, no rebound tenderness. Nonperitonitic. Negative Segovia's sign.  RUE: AVF distal UE. +palpable thrill   Skin: Warm and dry.         I&O's Detail      LABS:                        8.8    7.25  )-----------( 146      ( 25 Jan 2024 09:25 )             26.7              01-25    130<L>  |  94<L>  |  122<H>  ----------------------------<  91  4.7   |  36<H>  |  11.50<H>    Ca    11.5<H>      25 Jan 2024 09:25  Phos  8.0     01-25  Mg     2.9     01-25    TPro  8.3  /  Alb  3.4<L>  /  TBili  0.4  /  DBili  x   /  AST  74<H>  /  ALT  102<H>  /  AlkPhos  100  01-25              Urinalysis Basic - ( 25 Jan 2024 09:25 )    Color: x / Appearance: x / SG: x / pH: x  Gluc: 91 mg/dL / Ketone: x  / Bili: x / Urobili: x   Blood: x / Protein: x / Nitrite: x   Leuk Esterase: x / RBC: x / WBC x   Sq Epi: x / Non Sq Epi: x / Bacteria: x        RADIOLOGY & ADDITIONAL STUDIES:  < from: CT Abdomen and Pelvis w/ IV Cont (01.25.24 @ 06:21) >  ACC: [id number] EXAM:  CT ABDOMEN AND PELVIS IC   ORDERED BY: ALBERTINA VO     PROCEDURE DATE:  01/25/2024          INTERPRETATION:  CLINICAL INFORMATION: Acute pancreatitis    COMPARISON: 11/14/2023    CONTRAST/COMPLICATIONS:  IV Contrast: Omnipaque 350  90 cc administered   10 cc discarded  Oral Contrast: NONE  Complications: None reported at time of study completion    PROCEDURE:  CT of the Abdomen and Pelvis was performed.  Sagittal and coronal reformats were performed.    FINDINGS:  LOWER CHEST: Small bilateral atelectasis. New small right pleural   effusion.    LIVER: Mild nodular contour of the liver may represent cirrhosis.   Clinical correlation is recommended.  BILE DUCTS: Normal caliber.  GALLBLADDER: No CT evidence for gallstones. Pericholecystic stranding is   again noted. This may be related to acute/chronic cholecystitis or liver   pathology. If clinically indicated, HIDA scan may be pursued for further   evaluation.  SPLEEN: Splenomegaly is again noted measuring 15.6 cm, grossly unchanged.  PANCREAS: Mild stranding adjacent to the pancreatic head and body,   compatible with acute pancreatitis. No evidence for pancreatic necrosis.  ADRENALS: Within normal limits.  KIDNEYS/URETERS: Atrophic kidneys bilaterally. No evidence for a ureteral   calculus. No hydronephrosis.    BLADDER: Underdistended.  REPRODUCTIVE ORGANS: The prostate and seminal vesicles appear grossly   unremarkable.    BOWEL: No bowel obstruction. Appendix within normal limits.  PERITONEUM: Small ascites. No free air.  VESSELS: Mild varices. Mild calcified atherosclerotic disease.  RETROPERITONEUM/LYMPH NODES: Mildly enlarged 1.1 cm left external iliac   chain lymph node (image 155 series 2).  ABDOMINAL WALL: Small fat-containing umbilical hernia. Stable   subcutaneous nodules and stranding in the body wall may be postsurgical   in nature.  BONES: Within normal limits.    IMPRESSION:  Mild nodular contour of the liver may represent cirrhosis. Clinical   correlation is recommended. Splenomegaly and mild varices, suggestive of   associated portal hypertension.    Mild stranding adjacent to the pancreatic head and body, compatible with   acute pancreatitis. No evidence for pancreatic necrosis.    Small ascites.    Pericholecystic stranding is again noted. This may be related to   acute/chronic cholecystitis or liver pathology. If clinically indicated,   HIDA scan may be pursued for further evaluation.    Mildly enlarged 1.1 cm left external iliac chain lymph node.    New small right pleural effusion.    --- End of Report ---            ERICK BOWERS MD; Attending Radiologist  This document has been electronically signed. Jan 25 2024  9:02AM    < end of copied text >

## 2024-01-26 LAB
ALBUMIN SERPL ELPH-MCNC: 3.1 G/DL — LOW (ref 3.5–5)
ALP SERPL-CCNC: 96 U/L — SIGNIFICANT CHANGE UP (ref 40–120)
ALT FLD-CCNC: 98 U/L DA — HIGH (ref 10–60)
ANION GAP SERPL CALC-SCNC: 9 MMOL/L — SIGNIFICANT CHANGE UP (ref 5–17)
AST SERPL-CCNC: 69 U/L — HIGH (ref 10–40)
BASOPHILS # BLD AUTO: 0.01 K/UL — SIGNIFICANT CHANGE UP (ref 0–0.2)
BASOPHILS NFR BLD AUTO: 0.2 % — SIGNIFICANT CHANGE UP (ref 0–2)
BILIRUB SERPL-MCNC: 0.5 MG/DL — SIGNIFICANT CHANGE UP (ref 0.2–1.2)
BUN SERPL-MCNC: 70 MG/DL — HIGH (ref 7–18)
CALCIUM SERPL-MCNC: 10.4 MG/DL — SIGNIFICANT CHANGE UP (ref 8.4–10.5)
CHLORIDE SERPL-SCNC: 94 MMOL/L — LOW (ref 96–108)
CO2 SERPL-SCNC: 27 MMOL/L — SIGNIFICANT CHANGE UP (ref 22–31)
CREAT SERPL-MCNC: 8.4 MG/DL — HIGH (ref 0.5–1.3)
EGFR: 8 ML/MIN/1.73M2 — LOW
EOSINOPHIL # BLD AUTO: 0.06 K/UL — SIGNIFICANT CHANGE UP (ref 0–0.5)
EOSINOPHIL NFR BLD AUTO: 1.1 % — SIGNIFICANT CHANGE UP (ref 0–6)
GLUCOSE SERPL-MCNC: 92 MG/DL — SIGNIFICANT CHANGE UP (ref 70–99)
HCT VFR BLD CALC: 26.3 % — LOW (ref 39–50)
HGB BLD-MCNC: 8.7 G/DL — LOW (ref 13–17)
IMM GRANULOCYTES NFR BLD AUTO: 0.6 % — SIGNIFICANT CHANGE UP (ref 0–0.9)
LYMPHOCYTES # BLD AUTO: 0.58 K/UL — LOW (ref 1–3.3)
LYMPHOCYTES # BLD AUTO: 11 % — LOW (ref 13–44)
MAGNESIUM SERPL-MCNC: 2.4 MG/DL — SIGNIFICANT CHANGE UP (ref 1.6–2.6)
MCHC RBC-ENTMCNC: 33.1 GM/DL — SIGNIFICANT CHANGE UP (ref 32–36)
MCHC RBC-ENTMCNC: 33.2 PG — SIGNIFICANT CHANGE UP (ref 27–34)
MCV RBC AUTO: 100.4 FL — HIGH (ref 80–100)
MONOCYTES # BLD AUTO: 0.62 K/UL — SIGNIFICANT CHANGE UP (ref 0–0.9)
MONOCYTES NFR BLD AUTO: 11.8 % — SIGNIFICANT CHANGE UP (ref 2–14)
NEUTROPHILS # BLD AUTO: 3.96 K/UL — SIGNIFICANT CHANGE UP (ref 1.8–7.4)
NEUTROPHILS NFR BLD AUTO: 75.3 % — SIGNIFICANT CHANGE UP (ref 43–77)
NRBC # BLD: 0 /100 WBCS — SIGNIFICANT CHANGE UP (ref 0–0)
PHOSPHATE SERPL-MCNC: 5.2 MG/DL — HIGH (ref 2.5–4.5)
PLATELET # BLD AUTO: 146 K/UL — LOW (ref 150–400)
POTASSIUM SERPL-MCNC: 3.9 MMOL/L — SIGNIFICANT CHANGE UP (ref 3.5–5.3)
POTASSIUM SERPL-SCNC: 3.9 MMOL/L — SIGNIFICANT CHANGE UP (ref 3.5–5.3)
PROT SERPL-MCNC: 7.8 G/DL — SIGNIFICANT CHANGE UP (ref 6–8.3)
RBC # BLD: 2.62 M/UL — LOW (ref 4.2–5.8)
RBC # FLD: 13.4 % — SIGNIFICANT CHANGE UP (ref 10.3–14.5)
SODIUM SERPL-SCNC: 130 MMOL/L — LOW (ref 135–145)
WBC # BLD: 5.26 K/UL — SIGNIFICANT CHANGE UP (ref 3.8–10.5)
WBC # FLD AUTO: 5.26 K/UL — SIGNIFICANT CHANGE UP (ref 3.8–10.5)

## 2024-01-26 PROCEDURE — 76705 ECHO EXAM OF ABDOMEN: CPT | Mod: 26

## 2024-01-26 PROCEDURE — 99233 SBSQ HOSP IP/OBS HIGH 50: CPT | Mod: GC

## 2024-01-26 PROCEDURE — 99232 SBSQ HOSP IP/OBS MODERATE 35: CPT

## 2024-01-26 RX ORDER — QUETIAPINE FUMARATE 200 MG/1
300 TABLET, FILM COATED ORAL DAILY
Refills: 0 | Status: DISCONTINUED | OUTPATIENT
Start: 2024-01-26 | End: 2024-02-02

## 2024-01-26 RX ORDER — SODIUM CHLORIDE 9 MG/ML
1000 INJECTION INTRAMUSCULAR; INTRAVENOUS; SUBCUTANEOUS
Refills: 0 | Status: DISCONTINUED | OUTPATIENT
Start: 2024-01-26 | End: 2024-01-27

## 2024-01-26 RX ORDER — CIPROFLOXACIN LACTATE 400MG/40ML
400 VIAL (ML) INTRAVENOUS EVERY 24 HOURS
Refills: 0 | Status: DISCONTINUED | OUTPATIENT
Start: 2024-01-26 | End: 2024-02-01

## 2024-01-26 RX ORDER — ONDANSETRON 8 MG/1
4 TABLET, FILM COATED ORAL EVERY 8 HOURS
Refills: 0 | Status: DISCONTINUED | OUTPATIENT
Start: 2024-01-26 | End: 2024-01-26

## 2024-01-26 RX ORDER — SEVELAMER CARBONATE 2400 MG/1
800 POWDER, FOR SUSPENSION ORAL
Refills: 0 | Status: DISCONTINUED | OUTPATIENT
Start: 2024-01-26 | End: 2024-02-02

## 2024-01-26 RX ORDER — METRONIDAZOLE 500 MG
500 TABLET ORAL EVERY 8 HOURS
Refills: 0 | Status: DISCONTINUED | OUTPATIENT
Start: 2024-01-26 | End: 2024-02-01

## 2024-01-26 RX ORDER — ONDANSETRON 8 MG/1
4 TABLET, FILM COATED ORAL EVERY 8 HOURS
Refills: 0 | Status: DISCONTINUED | OUTPATIENT
Start: 2024-01-26 | End: 2024-02-02

## 2024-01-26 RX ORDER — HYDRALAZINE HCL 50 MG
100 TABLET ORAL THREE TIMES A DAY
Refills: 0 | Status: DISCONTINUED | OUTPATIENT
Start: 2024-01-26 | End: 2024-02-02

## 2024-01-26 RX ADMIN — Medication 200 MILLIGRAM(S): at 21:50

## 2024-01-26 RX ADMIN — BUDESONIDE AND FORMOTEROL FUMARATE DIHYDRATE 2 PUFF(S): 160; 4.5 AEROSOL RESPIRATORY (INHALATION) at 16:40

## 2024-01-26 RX ADMIN — Medication 25 MILLIGRAM(S): at 06:12

## 2024-01-26 RX ADMIN — CHLORHEXIDINE GLUCONATE 1 APPLICATION(S): 213 SOLUTION TOPICAL at 16:40

## 2024-01-26 RX ADMIN — QUETIAPINE FUMARATE 300 MILLIGRAM(S): 200 TABLET, FILM COATED ORAL at 21:50

## 2024-01-26 RX ADMIN — HEPARIN SODIUM 5000 UNIT(S): 5000 INJECTION INTRAVENOUS; SUBCUTANEOUS at 17:32

## 2024-01-26 RX ADMIN — Medication 100 MILLIGRAM(S): at 23:24

## 2024-01-26 RX ADMIN — RISPERIDONE 2 MILLIGRAM(S): 4 TABLET ORAL at 06:11

## 2024-01-26 RX ADMIN — ATORVASTATIN CALCIUM 40 MILLIGRAM(S): 80 TABLET, FILM COATED ORAL at 21:51

## 2024-01-26 RX ADMIN — Medication 25 MILLIGRAM(S): at 21:36

## 2024-01-26 RX ADMIN — HYDROMORPHONE HYDROCHLORIDE 0.5 MILLIGRAM(S): 2 INJECTION INTRAMUSCULAR; INTRAVENOUS; SUBCUTANEOUS at 15:30

## 2024-01-26 RX ADMIN — HYDROMORPHONE HYDROCHLORIDE 0.5 MILLIGRAM(S): 2 INJECTION INTRAMUSCULAR; INTRAVENOUS; SUBCUTANEOUS at 14:45

## 2024-01-26 RX ADMIN — ONDANSETRON 4 MILLIGRAM(S): 8 TABLET, FILM COATED ORAL at 16:01

## 2024-01-26 RX ADMIN — SEVELAMER CARBONATE 800 MILLIGRAM(S): 2400 POWDER, FOR SUSPENSION ORAL at 06:11

## 2024-01-26 RX ADMIN — HEPARIN SODIUM 5000 UNIT(S): 5000 INJECTION INTRAVENOUS; SUBCUTANEOUS at 06:11

## 2024-01-26 RX ADMIN — RISPERIDONE 2 MILLIGRAM(S): 4 TABLET ORAL at 21:50

## 2024-01-26 RX ADMIN — CARVEDILOL PHOSPHATE 25 MILLIGRAM(S): 80 CAPSULE, EXTENDED RELEASE ORAL at 21:36

## 2024-01-26 RX ADMIN — Medication 100 MILLIGRAM(S): at 21:36

## 2024-01-26 RX ADMIN — CARVEDILOL PHOSPHATE 25 MILLIGRAM(S): 80 CAPSULE, EXTENDED RELEASE ORAL at 06:12

## 2024-01-26 RX ADMIN — AMLODIPINE BESYLATE 5 MILLIGRAM(S): 2.5 TABLET ORAL at 06:12

## 2024-01-26 RX ADMIN — Medication 50 MILLIGRAM(S): at 06:12

## 2024-01-26 RX ADMIN — TIOTROPIUM BROMIDE 2 PUFF(S): 18 CAPSULE ORAL; RESPIRATORY (INHALATION) at 16:40

## 2024-01-26 NOTE — PROGRESS NOTE ADULT - PROBLEM SELECTOR PLAN 2
h/o ESRD on HD MWF  last HD session on Monday (only completed 3 hours due to GI symptoms) and missed his HD on Wednesday  follows Dr. La, will consult to resume HD h/o ESRD on HD MWF  last HD session on Monday (only completed 3 hours due to GI symptoms) and missed his HD on Wednesday  follows Dr. La, will consult to resume HD  AV Fistula in RUE  monitor fluid status while pt is receiving fluids

## 2024-01-26 NOTE — PROGRESS NOTE ADULT - PROBLEM SELECTOR PLAN 1
p/w with nausea, vomiting, and diarrhea x 1 week  no recent travel, no foods or sick contacts  umbilical, epigastric and RUQ abdominal regions TTP on admission  lipase 304  unknown etiology, possibly 2/2 gallstones  f/u CTAP   will hold off on IVF for now due to missed HD   NPO, advance as tolerated  pain control, tylenol for mild, 0,25 dilaudid q6 for moderate and 0.5 dilaudid q6 for severe pain Likely gallstone related  p/w with nausea, vomiting, and diarrhea x 1 week  no recent travel, no foods or sick contacts  umbilical, epigastric and RUQ abdominal regions TTP on admission  lipase 304    CT AP: -Mild nodular contour of the liver may represent cirrhosis. Splenomegaly and mild varices, suggestive of   associated portal hypertension.   -Mild stranding adjacent to the pancreatic head and body, compatible with acute pancreatitis. No evidence for pancreatic necrosis.  -Pericholecystic stranding is again noted.     US RUQ: Cholelithiasis with diffuse gallbladder wall thickening. Possible acute cholecystitis  NPO, advance as tolerated  pain control, tylenol for mild, 0,25 dilaudid q6 for moderate and 0.5 dilaudid q6 for severe pain  1/26 cont IV fluids for 24 hours at 100cc/hr  -Lap javier next week No gallstones seen on RUQ US  p/w with nausea, vomiting, and diarrhea x 1 week  no recent travel, no foods or sick contacts  umbilical, epigastric and RUQ abdominal regions TTP on admission  lipase 304    CT AP: -Mild nodular contour of the liver may represent cirrhosis. Splenomegaly and mild varices, suggestive of   associated portal hypertension.   -Mild stranding adjacent to the pancreatic head and body, compatible with acute pancreatitis. No evidence for pancreatic necrosis.  -Pericholecystic stranding is again noted.     US RUQ: Cholelithiasis with diffuse gallbladder wall thickening. Possible acute cholecystitis  NPO, advance as tolerated  pain control, tylenol for mild, 0,25 dilaudid q6 for moderate and 0.5 dilaudid q6 for severe pain  1/26 cont IV fluids for 24 hours at 100cc/hr  -Lap javier next week

## 2024-01-26 NOTE — PROGRESS NOTE ADULT - ASSESSMENT
# ESRD.  HD MWF. had HD yesterday after . In light of acute pancreatitis, and getting IVF, will plan for HD today as well with UF as tolerated.  noted for lap javier next week  # anemia of CKD epogen on hemodialysis  # RENAL OSTEODYSTROPHY. PHOS IS HIGH.  RENVELA WHEN ON A DIET. IN LIGHT OF N&V D/C SENSIPAR.  # HTN. blood pressure high in light of pain. pain control  incr hydralazine 50>>100mg

## 2024-01-26 NOTE — PROGRESS NOTE ADULT - SUBJECTIVE AND OBJECTIVE BOX
PGY-1 Progress Note discussed with attending    PAGER #: [[phone]] TILL 5:00 PM  PLEASE CONTACT ON CALL TEAM:  - On Call Team (Please refer to Janine) FROM 5:00 PM - 8:30PM  - Nightfloat Team FROM 8:30 -7:30 AM    CHIEF COMPLAINT & BRIEF HOSPITAL COURSE:      INTERVAL HPI/OVERNIGHT EVENTS:       MEDICATIONS  (STANDING):  amLODIPine   Tablet 5 milliGRAM(s) Oral daily  atorvastatin 40 milliGRAM(s) Oral at bedtime  budesonide 160 MICROgram(s)/formoterol 4.5 MICROgram(s) Inhaler 2 Puff(s) Inhalation two times a day  calcitriol   Capsule 0.5 MICROGram(s) Oral daily  carvedilol 25 milliGRAM(s) Oral every 12 hours  chlorhexidine 2% Cloths 1 Application(s) Topical daily  epoetin cyndie (PROCRIT) Injectable 4000 Unit(s) IV Push <User Schedule>  escitalopram 10 milliGRAM(s) Oral daily  haloperidol     Tablet 2 milliGRAM(s) Oral daily  heparin   Injectable 5000 Unit(s) SubCutaneous every 8 hours  hydrALAZINE 100 milliGRAM(s) Oral three times a day  hydrOXYzine hydrochloride 25 milliGRAM(s) Oral every 12 hours  mirtazapine 15 milliGRAM(s) Oral daily  QUEtiapine 300 milliGRAM(s) Oral daily  risperiDONE   Tablet 2 milliGRAM(s) Oral two times a day  sevelamer carbonate 800 milliGRAM(s) Oral three times a day  sodium chloride 0.9%. 1000 milliLiter(s) (100 mL/Hr) IV Continuous <Continuous>  sodium chloride 0.9%. 1000 milliLiter(s) (100 mL/Hr) IV Continuous <Continuous>  tiotropium 2.5 MICROgram(s) Inhaler 2 Puff(s) Inhalation daily  traZODone 100 milliGRAM(s) Oral daily    MEDICATIONS  (PRN):  acetaminophen     Tablet .. 650 milliGRAM(s) Oral every 6 hours PRN Temp greater or equal to 38C (100.4F), Mild Pain (1 - 3)  albuterol    90 MICROgram(s) HFA Inhaler 2 Puff(s) Inhalation every 6 hours PRN Shortness of Breath and/or Wheezing  aluminum hydroxide/magnesium hydroxide/simethicone Suspension 30 milliLiter(s) Oral every 4 hours PRN Dyspepsia  HYDROmorphone  Injectable 0.25 milliGRAM(s) IV Push every 6 hours PRN Moderate Pain (4 - 6)  HYDROmorphone  Injectable 0.5 milliGRAM(s) IV Push every 6 hours PRN Severe Pain (7 - 10)  melatonin 3 milliGRAM(s) Oral at bedtime PRN Insomnia  ondansetron Injectable 4 milliGRAM(s) IV Push every 8 hours PRN Nausea and/or Vomiting      REVIEW OF SYSTEMS:  CONSTITUTIONAL: No fever, weight loss, or fatigue  RESPIRATORY: No shortness of breath  CARDIOVASCULAR: No chest pain  GASTROINTESTINAL: No abdominal pain.  GENITOURINARY: No dysuria  NEUROLOGICAL: No headaches  SKIN: No itching, burning, rashes    Vital Signs Last 24 Hrs  T(C): 36.6 (26 Jan 2024 05:45), Max: 36.8 (25 Jan 2024 16:39)  T(F): 97.9 (26 Jan 2024 05:45), Max: 98.3 (25 Jan 2024 20:12)  HR: 66 (26 Jan 2024 05:45) (66 - 68)  BP: 177/86 (26 Jan 2024 05:45) (158/77 - 183/63)  BP(mean): --  RR: 18 (26 Jan 2024 05:45) (16 - 18)  SpO2: 98% (26 Jan 2024 05:45) (96% - 98%)    Parameters below as of 26 Jan 2024 05:45  Patient On (Oxygen Delivery Method): nasal cannula  O2 Flow (L/min): 3      PHYSICAL EXAMINATION:  GENERAL: NAD, well built  HEAD:  Atraumatic, Normocephalic  EYES:  conjunctiva and sclera clear  CHEST/LUNG: Clear to auscultation. No rales, rhonchi, wheezing, or rubs  HEART: Regular rate and rhythm; No murmurs, rubs, or gallops  ABDOMEN: Soft, Nontender, Nondistended; Bowel sounds present  NERVOUS SYSTEM:  Alert & Oriented X3,    EXTREMITIES:  2+ Peripheral Pulses, No clubbing, cyanosis, or edema  SKIN: warm dry                          8.7    5.26  )-----------( 146      ( 26 Jan 2024 07:40 )             26.3     01-26    130<L>  |  94<L>  |  70<H>  ----------------------------<  92  3.9   |  27  |  8.40<H>    Ca    10.4      26 Jan 2024 07:40  Phos  5.2     01-26  Mg     2.4     01-26    TPro  7.8  /  Alb  3.1<L>  /  TBili  0.5  /  DBili  x   /  AST  69<H>  /  ALT  98<H>  /  AlkPhos  96  01-26    LIVER FUNCTIONS - ( 26 Jan 2024 07:40 )  Alb: 3.1 g/dL / Pro: 7.8 g/dL / ALK PHOS: 96 U/L / ALT: 98 U/L DA / AST: 69 U/L / GGT: x                   CAPILLARY BLOOD GLUCOSE      RADIOLOGY & ADDITIONAL TESTS:                   PGY-1 Progress Note discussed with attending    PAGER #: [[phone]] TILL 5:00 PM  PLEASE CONTACT ON CALL TEAM:  - On Call Team (Please refer to Janine) FROM 5:00 PM - 8:30PM  - Nightfloat Team FROM 8:30 -7:30 AM    CHIEF COMPLAINT & BRIEF HOSPITAL COURSE:  Pancreatitis, ESRD, Gallstones    INTERVAL HPI/OVERNIGHT EVENTS:   Pt had nausea/vomiting overnight.  Patient examined at bedside this AM.  Patient endorses vomiting up soup from this morning. He states he has pain in epigastric area and right lower quadrant.    MEDICATIONS  (STANDING):  amLODIPine   Tablet 5 milliGRAM(s) Oral daily  atorvastatin 40 milliGRAM(s) Oral at bedtime  budesonide 160 MICROgram(s)/formoterol 4.5 MICROgram(s) Inhaler 2 Puff(s) Inhalation two times a day  calcitriol   Capsule 0.5 MICROGram(s) Oral daily  carvedilol 25 milliGRAM(s) Oral every 12 hours  chlorhexidine 2% Cloths 1 Application(s) Topical daily  epoetin cyndie (PROCRIT) Injectable 4000 Unit(s) IV Push <User Schedule>  escitalopram 10 milliGRAM(s) Oral daily  haloperidol     Tablet 2 milliGRAM(s) Oral daily  heparin   Injectable 5000 Unit(s) SubCutaneous every 8 hours  hydrALAZINE 100 milliGRAM(s) Oral three times a day  hydrOXYzine hydrochloride 25 milliGRAM(s) Oral every 12 hours  mirtazapine 15 milliGRAM(s) Oral daily  QUEtiapine 300 milliGRAM(s) Oral daily  risperiDONE   Tablet 2 milliGRAM(s) Oral two times a day  sevelamer carbonate 800 milliGRAM(s) Oral three times a day  sodium chloride 0.9%. 1000 milliLiter(s) (100 mL/Hr) IV Continuous <Continuous>  sodium chloride 0.9%. 1000 milliLiter(s) (100 mL/Hr) IV Continuous <Continuous>  tiotropium 2.5 MICROgram(s) Inhaler 2 Puff(s) Inhalation daily  traZODone 100 milliGRAM(s) Oral daily    MEDICATIONS  (PRN):  acetaminophen     Tablet .. 650 milliGRAM(s) Oral every 6 hours PRN Temp greater or equal to 38C (100.4F), Mild Pain (1 - 3)  albuterol    90 MICROgram(s) HFA Inhaler 2 Puff(s) Inhalation every 6 hours PRN Shortness of Breath and/or Wheezing  aluminum hydroxide/magnesium hydroxide/simethicone Suspension 30 milliLiter(s) Oral every 4 hours PRN Dyspepsia  HYDROmorphone  Injectable 0.25 milliGRAM(s) IV Push every 6 hours PRN Moderate Pain (4 - 6)  HYDROmorphone  Injectable 0.5 milliGRAM(s) IV Push every 6 hours PRN Severe Pain (7 - 10)  melatonin 3 milliGRAM(s) Oral at bedtime PRN Insomnia  ondansetron Injectable 4 milliGRAM(s) IV Push every 8 hours PRN Nausea and/or Vomiting      REVIEW OF SYSTEMS:  CONSTITUTIONAL: No fever, weight loss, or fatigue  RESPIRATORY: No shortness of breath  CARDIOVASCULAR: No chest pain  GASTROINTESTINAL: +abdominal pain.  GENITOURINARY: No dysuria  NEUROLOGICAL: No headaches  SKIN: No itching, burning, rashes    Vital Signs Last 24 Hrs  T(C): 36.6 (26 Jan 2024 05:45), Max: 36.8 (25 Jan 2024 16:39)  T(F): 97.9 (26 Jan 2024 05:45), Max: 98.3 (25 Jan 2024 20:12)  HR: 66 (26 Jan 2024 05:45) (66 - 68)  BP: 177/86 (26 Jan 2024 05:45) (158/77 - 183/63)  BP(mean): --  RR: 18 (26 Jan 2024 05:45) (16 - 18)  SpO2: 98% (26 Jan 2024 05:45) (96% - 98%)    Parameters below as of 26 Jan 2024 05:45  Patient On (Oxygen Delivery Method): nasal cannula  O2 Flow (L/min): 3      PHYSICAL EXAMINATION:  GENERAL: mild distress, obese  HEAD:  Atraumatic, Normocephalic  EYES:  conjunctiva and sclera clear  CHEST/LUNG: Clear to auscultation. No rales, rhonchi, wheezing, or rubs  HEART: Regular rate and rhythm; No murmurs, rubs, or gallops  ABDOMEN: +tender in epigastric and RUQ and RLQ. Soft, Nondistended; Bowel sounds present  NERVOUS SYSTEM:  Alert & Oriented X3,    EXTREMITIES:  +AV fistula with palpable thrill in right forearm. 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 2+ Peripheral Pulses, No clubbing, cyanosis, or edema  SKIN: warm dry. pt is anicteric                          8.7    5.26  )-----------( 146      ( 26 Jan 2024 07:40 )             26.3     01-26    130<L>  |  94<L>  |  70<H>  ----------------------------<  92  3.9   |  27  |  8.40<H>    Ca    10.4      26 Jan 2024 07:40  Phos  5.2     01-26  Mg     2.4     01-26    TPro  7.8  /  Alb  3.1<L>  /  TBili  0.5  /  DBili  x   /  AST  69<H>  /  ALT  98<H>  /  AlkPhos  96  01-26    LIVER FUNCTIONS - ( 26 Jan 2024 07:40 )  Alb: 3.1 g/dL / Pro: 7.8 g/dL / ALK PHOS: 96 U/L / ALT: 98 U/L DA / AST: 69 U/L / GGT: x                   CAPILLARY BLOOD GLUCOSE      RADIOLOGY & ADDITIONAL TESTS:                   PGY-1 Progress Note discussed with attending    PAGER #: [[phone]] TILL 5:00 PM  PLEASE CONTACT ON CALL TEAM:  - On Call Team (Please refer to Janine) FROM 5:00 PM - 8:30PM  - Nightfloat Team FROM 8:30 -7:30 AM    CHIEF COMPLAINT & BRIEF HOSPITAL COURSE:  Pancreatitis, ESRD    INTERVAL HPI/OVERNIGHT EVENTS:   Pt had nausea/vomiting overnight.  Patient examined at bedside this AM.  Patient endorses vomiting up soup from this morning. He states he has pain in epigastric area and right lower quadrant.    MEDICATIONS  (STANDING):  amLODIPine   Tablet 5 milliGRAM(s) Oral daily  atorvastatin 40 milliGRAM(s) Oral at bedtime  budesonide 160 MICROgram(s)/formoterol 4.5 MICROgram(s) Inhaler 2 Puff(s) Inhalation two times a day  calcitriol   Capsule 0.5 MICROGram(s) Oral daily  carvedilol 25 milliGRAM(s) Oral every 12 hours  chlorhexidine 2% Cloths 1 Application(s) Topical daily  epoetin cyndie (PROCRIT) Injectable 4000 Unit(s) IV Push <User Schedule>  escitalopram 10 milliGRAM(s) Oral daily  haloperidol     Tablet 2 milliGRAM(s) Oral daily  heparin   Injectable 5000 Unit(s) SubCutaneous every 8 hours  hydrALAZINE 100 milliGRAM(s) Oral three times a day  hydrOXYzine hydrochloride 25 milliGRAM(s) Oral every 12 hours  mirtazapine 15 milliGRAM(s) Oral daily  QUEtiapine 300 milliGRAM(s) Oral daily  risperiDONE   Tablet 2 milliGRAM(s) Oral two times a day  sevelamer carbonate 800 milliGRAM(s) Oral three times a day  sodium chloride 0.9%. 1000 milliLiter(s) (100 mL/Hr) IV Continuous <Continuous>  sodium chloride 0.9%. 1000 milliLiter(s) (100 mL/Hr) IV Continuous <Continuous>  tiotropium 2.5 MICROgram(s) Inhaler 2 Puff(s) Inhalation daily  traZODone 100 milliGRAM(s) Oral daily    MEDICATIONS  (PRN):  acetaminophen     Tablet .. 650 milliGRAM(s) Oral every 6 hours PRN Temp greater or equal to 38C (100.4F), Mild Pain (1 - 3)  albuterol    90 MICROgram(s) HFA Inhaler 2 Puff(s) Inhalation every 6 hours PRN Shortness of Breath and/or Wheezing  aluminum hydroxide/magnesium hydroxide/simethicone Suspension 30 milliLiter(s) Oral every 4 hours PRN Dyspepsia  HYDROmorphone  Injectable 0.25 milliGRAM(s) IV Push every 6 hours PRN Moderate Pain (4 - 6)  HYDROmorphone  Injectable 0.5 milliGRAM(s) IV Push every 6 hours PRN Severe Pain (7 - 10)  melatonin 3 milliGRAM(s) Oral at bedtime PRN Insomnia  ondansetron Injectable 4 milliGRAM(s) IV Push every 8 hours PRN Nausea and/or Vomiting      REVIEW OF SYSTEMS:  CONSTITUTIONAL: No fever, weight loss, or fatigue  RESPIRATORY: No shortness of breath  CARDIOVASCULAR: No chest pain  GASTROINTESTINAL: +abdominal pain.  GENITOURINARY: No dysuria  NEUROLOGICAL: No headaches  SKIN: No itching, burning, rashes    Vital Signs Last 24 Hrs  T(C): 36.6 (26 Jan 2024 05:45), Max: 36.8 (25 Jan 2024 16:39)  T(F): 97.9 (26 Jan 2024 05:45), Max: 98.3 (25 Jan 2024 20:12)  HR: 66 (26 Jan 2024 05:45) (66 - 68)  BP: 177/86 (26 Jan 2024 05:45) (158/77 - 183/63)  BP(mean): --  RR: 18 (26 Jan 2024 05:45) (16 - 18)  SpO2: 98% (26 Jan 2024 05:45) (96% - 98%)    Parameters below as of 26 Jan 2024 05:45  Patient On (Oxygen Delivery Method): nasal cannula  O2 Flow (L/min): 3      PHYSICAL EXAMINATION:  GENERAL: mild distress, obese  HEAD:  Atraumatic, Normocephalic  EYES:  conjunctiva and sclera clear  CHEST/LUNG: Clear to auscultation. No rales, rhonchi, wheezing, or rubs  HEART: Regular rate and rhythm; No murmurs, rubs, or gallops  ABDOMEN: +tender in epigastric and RUQ and RLQ. Soft, Nondistended; Bowel sounds present  NERVOUS SYSTEM:  Alert & Oriented X3,    EXTREMITIES:  +AV fistula with palpable thrill in right forearm. 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 2+ Peripheral Pulses, No clubbing, cyanosis, or edema  SKIN: warm dry. pt is anicteric                          8.7    5.26  )-----------( 146      ( 26 Jan 2024 07:40 )             26.3     01-26    130<L>  |  94<L>  |  70<H>  ----------------------------<  92  3.9   |  27  |  8.40<H>    Ca    10.4      26 Jan 2024 07:40  Phos  5.2     01-26  Mg     2.4     01-26    TPro  7.8  /  Alb  3.1<L>  /  TBili  0.5  /  DBili  x   /  AST  69<H>  /  ALT  98<H>  /  AlkPhos  96  01-26    LIVER FUNCTIONS - ( 26 Jan 2024 07:40 )  Alb: 3.1 g/dL / Pro: 7.8 g/dL / ALK PHOS: 96 U/L / ALT: 98 U/L DA / AST: 69 U/L / GGT: x                   CAPILLARY BLOOD GLUCOSE      RADIOLOGY & ADDITIONAL TESTS:

## 2024-01-26 NOTE — PROGRESS NOTE ADULT - ASSESSMENT
43M PMH HTN, schizophrenia, COPD (4L home O2), and ESRD on HD (MWF) presenting to the ED with nausea, vomting and diarrhea admitted for acute pancreatitis

## 2024-01-26 NOTE — PROGRESS NOTE ADULT - ASSESSMENT
42 y/o male a/w gallstone panc   VSS, afebrile, no leukocytosis    Plan   - f/u RUQ US  - lap javier planning next week  - On CLD, recommend NPO if continued N/V   - IVF if clinically safe   - HD as per nephrology    - pain / nausea control prn

## 2024-01-26 NOTE — PROGRESS NOTE ADULT - SUBJECTIVE AND OBJECTIVE BOX
INTERVAL HPI/OVERNIGHT EVENTS: NAEO. AVSS. Patient seen and examined at bedside.  C/o epigastric abd pain, nausea, emesis this morning.     Vital Signs Last 24 Hrs  T(C): 36.6 (26 Jan 2024 05:45), Max: 37.1 (25 Jan 2024 11:00)  T(F): 97.9 (26 Jan 2024 05:45), Max: 98.8 (25 Jan 2024 11:00)  HR: 66 (26 Jan 2024 05:45) (62 - 70)  BP: 177/86 (26 Jan 2024 05:45) (158/77 - 183/63)  BP(mean): --  RR: 18 (26 Jan 2024 05:45) (16 - 18)  SpO2: 98% (26 Jan 2024 05:45) (96% - 98%)    Parameters below as of 26 Jan 2024 05:45  Patient On (Oxygen Delivery Method): nasal cannula  O2 Flow (L/min): 3    I&O's Detail    aluminum hydroxide/magnesium hydroxide/simethicone Suspension 30 milliLiter(s) Oral every 4 hours PRN  sevelamer carbonate 800 milliGRAM(s) Oral three times a day      Physical Exam:  General: AAOx3, No acute distress  HEENT: NC/AT, trachea midline  Respiratory: Nonlabored breathing, equal chest rise b/l   Skin: No jaundice, no icterus  Abdomen: obese, soft, nondistended, +ruq tenderness. no rebound  Extremities: non edematous, no calf pain bilaterally  Lines/Drains/Tubes:     Drains/Tubes:       Labs:                        8.8    7.25  )-----------( 146      ( 25 Jan 2024 09:25 )             26.7     01-25    130<L>  |  94<L>  |  122<H>  ----------------------------<  91  4.7   |  36<H>  |  11.50<H>    Ca    11.5<H>      25 Jan 2024 09:25  Phos  8.0     01-25  Mg     2.9     01-25    TPro  8.3  /  Alb  3.4<L>  /  TBili  0.4  /  DBili  x   /  AST  74<H>  /  ALT  102<H>  /  AlkPhos  100  01-25        RADIOLOGY & ADDITIONAL STUDIES:

## 2024-01-26 NOTE — PROGRESS NOTE ADULT - PROBLEM SELECTOR PLAN 3
h/o HTN  takse coreg, hydralazine, lasix and amlodipine  will stop lasix as pt no longer makes urine with ESRD  will resume coreg, hydralazind and amlodipine with parameters  monitor BP h/o HTN  takes coreg, hydralazine, lasix and amlodipine  will stop lasix as pt no longer makes urine with ESRD  will resume coreg, hydralazine and amlodipine with parameters  monitor BP

## 2024-01-26 NOTE — PROGRESS NOTE ADULT - SUBJECTIVE AND OBJECTIVE BOX
[facility] : Progress Note :: [phone], (office [phone]),   Dr La / Dr Hui / Dr Barber / Dr Villalobos / Dr Hang CASTELLANO / Dr Mehta / Dr Sanchez / Dr Alber dumont  _____________________________________________________________________________________________    still with vomitting. abdominal pain    shellfish (Anaphylaxis)  shellfish (Rash)  penicillins (Swelling)  fish (Unknown)  penicillin (Anaphylaxis)  Seafood (Hives)  aspirin (Swelling)    Hospital Medications:   MEDICATIONS  (STANDING):  amLODIPine   Tablet 5 milliGRAM(s) Oral daily  atorvastatin 40 milliGRAM(s) Oral at bedtime  budesonide 160 MICROgram(s)/formoterol 4.5 MICROgram(s) Inhaler 2 Puff(s) Inhalation two times a day  calcitriol   Capsule 0.5 MICROGram(s) Oral daily  carvedilol 25 milliGRAM(s) Oral every 12 hours  chlorhexidine 2% Cloths 1 Application(s) Topical daily  cinacalcet 30 milliGRAM(s) Oral daily  epoetin cyndie (PROCRIT) Injectable 4000 Unit(s) IV Push <User Schedule>  escitalopram 10 milliGRAM(s) Oral daily  haloperidol     Tablet 2 milliGRAM(s) Oral daily  heparin   Injectable 5000 Unit(s) SubCutaneous every 8 hours  hydrALAZINE 50 milliGRAM(s) Oral daily  hydrOXYzine hydrochloride 25 milliGRAM(s) Oral every 12 hours  mirtazapine 15 milliGRAM(s) Oral daily  QUEtiapine 300 milliGRAM(s) Oral daily  risperiDONE   Tablet 2 milliGRAM(s) Oral two times a day  sevelamer carbonate 800 milliGRAM(s) Oral three times a day  sodium chloride 0.9%. 1000 milliLiter(s) (100 mL/Hr) IV Continuous <Continuous>  tiotropium 2.5 MICROgram(s) Inhaler 2 Puff(s) Inhalation daily  traZODone 100 milliGRAM(s) Oral daily        VITALS:  T(F): 97.9 (01-26-24 @ 05:45), Max: 98.8 (01-25-24 @ 11:00)  HR: 66 (01-26-24 @ 05:45)  BP: 177/86 (01-26-24 @ 05:45)  RR: 18 (01-26-24 @ 05:45)  SpO2: 98% (01-26-24 @ 05:45)  Wt(kg): --      PHYSICAL EXAM:  Constitutional: obese  HEENT: anicteric sclera, oropharynx clear,  Neck: No JVD  Respiratory: CTAB, no wheezes, rales or rhonchi  Cardiovascular: S1, S2, RRR  Gastrointestinal: BS+, soft, NT/ND  Extremities: No peripheral edema  Neurological: A/O x 3, no focal deficits  Vascular Access: AVF with thrill and bruit     LABS:  01-26    130<L>  |  94<L>  |  70<H>  ----------------------------<  92  3.9   |  27  |  8.40<H>    Ca    10.4      26 Jan 2024 07:40  Phos  5.2     01-26  Mg     2.4     01-26    TPro  7.8  /  Alb  3.1<L>  /  TBili  0.5  /  DBili      /  AST  69<H>  /  ALT  98<H>  /  AlkPhos  96  01-26    Creatinine Trend: 8.40 <--, 11.50 <--, 11.30 <--                        8.7    5.26  )-----------( 146      ( 26 Jan 2024 07:40 )             26.3     Urine Studies:  Urinalysis Basic - ( 26 Jan 2024 07:40 )    Color:  / Appearance:  / SG:  / pH:   Gluc: 92 mg/dL / Ketone:   / Bili:  / Urobili:    Blood:  / Protein:  / Nitrite:    Leuk Esterase:  / RBC:  / WBC    Sq Epi:  / Non Sq Epi:  / Bacteria:         RADIOLOGY & ADDITIONAL STUDIES:

## 2024-01-27 LAB
ALBUMIN SERPL ELPH-MCNC: 3.3 G/DL — LOW (ref 3.5–5)
ALP SERPL-CCNC: 92 U/L — SIGNIFICANT CHANGE UP (ref 40–120)
ALT FLD-CCNC: 86 U/L DA — HIGH (ref 10–60)
ANION GAP SERPL CALC-SCNC: 7 MMOL/L — SIGNIFICANT CHANGE UP (ref 5–17)
AST SERPL-CCNC: 53 U/L — HIGH (ref 10–40)
BASOPHILS # BLD AUTO: 0.02 K/UL — SIGNIFICANT CHANGE UP (ref 0–0.2)
BASOPHILS NFR BLD AUTO: 0.3 % — SIGNIFICANT CHANGE UP (ref 0–2)
BILIRUB SERPL-MCNC: 0.5 MG/DL — SIGNIFICANT CHANGE UP (ref 0.2–1.2)
BUN SERPL-MCNC: 48 MG/DL — HIGH (ref 7–18)
CALCIUM SERPL-MCNC: 9.8 MG/DL — SIGNIFICANT CHANGE UP (ref 8.4–10.5)
CHLORIDE SERPL-SCNC: 95 MMOL/L — LOW (ref 96–108)
CO2 SERPL-SCNC: 30 MMOL/L — SIGNIFICANT CHANGE UP (ref 22–31)
CREAT SERPL-MCNC: 7.28 MG/DL — HIGH (ref 0.5–1.3)
EGFR: 9 ML/MIN/1.73M2 — LOW
EOSINOPHIL # BLD AUTO: 0.07 K/UL — SIGNIFICANT CHANGE UP (ref 0–0.5)
EOSINOPHIL NFR BLD AUTO: 1.1 % — SIGNIFICANT CHANGE UP (ref 0–6)
GLUCOSE SERPL-MCNC: 101 MG/DL — HIGH (ref 70–99)
HCT VFR BLD CALC: 28.4 % — LOW (ref 39–50)
HGB BLD-MCNC: 9.2 G/DL — LOW (ref 13–17)
IMM GRANULOCYTES NFR BLD AUTO: 0.3 % — SIGNIFICANT CHANGE UP (ref 0–0.9)
LYMPHOCYTES # BLD AUTO: 0.63 K/UL — LOW (ref 1–3.3)
LYMPHOCYTES # BLD AUTO: 9.9 % — LOW (ref 13–44)
MAGNESIUM SERPL-MCNC: 1.3 MG/DL — LOW (ref 1.6–2.6)
MCHC RBC-ENTMCNC: 32.4 GM/DL — SIGNIFICANT CHANGE UP (ref 32–36)
MCHC RBC-ENTMCNC: 33 PG — SIGNIFICANT CHANGE UP (ref 27–34)
MCV RBC AUTO: 101.8 FL — HIGH (ref 80–100)
MONOCYTES # BLD AUTO: 0.77 K/UL — SIGNIFICANT CHANGE UP (ref 0–0.9)
MONOCYTES NFR BLD AUTO: 12.1 % — SIGNIFICANT CHANGE UP (ref 2–14)
NEUTROPHILS # BLD AUTO: 4.83 K/UL — SIGNIFICANT CHANGE UP (ref 1.8–7.4)
NEUTROPHILS NFR BLD AUTO: 76.3 % — SIGNIFICANT CHANGE UP (ref 43–77)
NRBC # BLD: 0 /100 WBCS — SIGNIFICANT CHANGE UP (ref 0–0)
PHOSPHATE SERPL-MCNC: 5.4 MG/DL — HIGH (ref 2.5–4.5)
PLATELET # BLD AUTO: 151 K/UL — SIGNIFICANT CHANGE UP (ref 150–400)
POTASSIUM SERPL-MCNC: 3.9 MMOL/L — SIGNIFICANT CHANGE UP (ref 3.5–5.3)
POTASSIUM SERPL-SCNC: 3.9 MMOL/L — SIGNIFICANT CHANGE UP (ref 3.5–5.3)
PROT SERPL-MCNC: 7.9 G/DL — SIGNIFICANT CHANGE UP (ref 6–8.3)
RBC # BLD: 2.79 M/UL — LOW (ref 4.2–5.8)
RBC # FLD: 13.5 % — SIGNIFICANT CHANGE UP (ref 10.3–14.5)
SODIUM SERPL-SCNC: 132 MMOL/L — LOW (ref 135–145)
WBC # BLD: 6.34 K/UL — SIGNIFICANT CHANGE UP (ref 3.8–10.5)
WBC # FLD AUTO: 6.34 K/UL — SIGNIFICANT CHANGE UP (ref 3.8–10.5)

## 2024-01-27 PROCEDURE — 99232 SBSQ HOSP IP/OBS MODERATE 35: CPT

## 2024-01-27 RX ORDER — SODIUM CHLORIDE 9 MG/ML
1000 INJECTION INTRAMUSCULAR; INTRAVENOUS; SUBCUTANEOUS
Refills: 0 | Status: DISCONTINUED | OUTPATIENT
Start: 2024-01-27 | End: 2024-01-28

## 2024-01-27 RX ADMIN — Medication 100 MILLIGRAM(S): at 05:59

## 2024-01-27 RX ADMIN — Medication 25 MILLIGRAM(S): at 06:22

## 2024-01-27 RX ADMIN — HEPARIN SODIUM 5000 UNIT(S): 5000 INJECTION INTRAVENOUS; SUBCUTANEOUS at 13:52

## 2024-01-27 RX ADMIN — QUETIAPINE FUMARATE 300 MILLIGRAM(S): 200 TABLET, FILM COATED ORAL at 17:10

## 2024-01-27 RX ADMIN — Medication 100 MILLIGRAM(S): at 14:39

## 2024-01-27 RX ADMIN — BUDESONIDE AND FORMOTEROL FUMARATE DIHYDRATE 2 PUFF(S): 160; 4.5 AEROSOL RESPIRATORY (INHALATION) at 09:33

## 2024-01-27 RX ADMIN — HYDROMORPHONE HYDROCHLORIDE 0.25 MILLIGRAM(S): 2 INJECTION INTRAMUSCULAR; INTRAVENOUS; SUBCUTANEOUS at 13:51

## 2024-01-27 RX ADMIN — Medication 25 MILLIGRAM(S): at 17:10

## 2024-01-27 RX ADMIN — ATORVASTATIN CALCIUM 40 MILLIGRAM(S): 80 TABLET, FILM COATED ORAL at 23:01

## 2024-01-27 RX ADMIN — CHLORHEXIDINE GLUCONATE 1 APPLICATION(S): 213 SOLUTION TOPICAL at 14:02

## 2024-01-27 RX ADMIN — ESCITALOPRAM OXALATE 10 MILLIGRAM(S): 10 TABLET, FILM COATED ORAL at 13:53

## 2024-01-27 RX ADMIN — HEPARIN SODIUM 5000 UNIT(S): 5000 INJECTION INTRAVENOUS; SUBCUTANEOUS at 23:01

## 2024-01-27 RX ADMIN — HYDROMORPHONE HYDROCHLORIDE 0.5 MILLIGRAM(S): 2 INJECTION INTRAMUSCULAR; INTRAVENOUS; SUBCUTANEOUS at 10:33

## 2024-01-27 RX ADMIN — SODIUM CHLORIDE 100 MILLILITER(S): 9 INJECTION INTRAMUSCULAR; INTRAVENOUS; SUBCUTANEOUS at 05:59

## 2024-01-27 RX ADMIN — Medication 100 MILLIGRAM(S): at 13:52

## 2024-01-27 RX ADMIN — Medication 100 MILLIGRAM(S): at 23:02

## 2024-01-27 RX ADMIN — RISPERIDONE 2 MILLIGRAM(S): 4 TABLET ORAL at 17:11

## 2024-01-27 RX ADMIN — HEPARIN SODIUM 5000 UNIT(S): 5000 INJECTION INTRAVENOUS; SUBCUTANEOUS at 06:22

## 2024-01-27 RX ADMIN — HYDROMORPHONE HYDROCHLORIDE 0.5 MILLIGRAM(S): 2 INJECTION INTRAMUSCULAR; INTRAVENOUS; SUBCUTANEOUS at 02:14

## 2024-01-27 RX ADMIN — SEVELAMER CARBONATE 800 MILLIGRAM(S): 2400 POWDER, FOR SUSPENSION ORAL at 13:53

## 2024-01-27 RX ADMIN — AMLODIPINE BESYLATE 5 MILLIGRAM(S): 2.5 TABLET ORAL at 06:21

## 2024-01-27 RX ADMIN — HYDROMORPHONE HYDROCHLORIDE 0.5 MILLIGRAM(S): 2 INJECTION INTRAMUSCULAR; INTRAVENOUS; SUBCUTANEOUS at 01:29

## 2024-01-27 RX ADMIN — CALCITRIOL 0.5 MICROGRAM(S): 0.5 CAPSULE ORAL at 13:52

## 2024-01-27 RX ADMIN — ONDANSETRON 4 MILLIGRAM(S): 8 TABLET, FILM COATED ORAL at 23:35

## 2024-01-27 RX ADMIN — Medication 200 MILLIGRAM(S): at 23:36

## 2024-01-27 RX ADMIN — Medication 100 MILLIGRAM(S): at 23:01

## 2024-01-27 RX ADMIN — CARVEDILOL PHOSPHATE 25 MILLIGRAM(S): 80 CAPSULE, EXTENDED RELEASE ORAL at 06:21

## 2024-01-27 RX ADMIN — Medication 100 MILLIGRAM(S): at 06:21

## 2024-01-27 RX ADMIN — RISPERIDONE 2 MILLIGRAM(S): 4 TABLET ORAL at 06:21

## 2024-01-27 RX ADMIN — BUDESONIDE AND FORMOTEROL FUMARATE DIHYDRATE 2 PUFF(S): 160; 4.5 AEROSOL RESPIRATORY (INHALATION) at 23:03

## 2024-01-27 RX ADMIN — HALOPERIDOL DECANOATE 2 MILLIGRAM(S): 100 INJECTION INTRAMUSCULAR at 13:53

## 2024-01-27 RX ADMIN — MIRTAZAPINE 15 MILLIGRAM(S): 45 TABLET, ORALLY DISINTEGRATING ORAL at 13:52

## 2024-01-27 RX ADMIN — HYDROMORPHONE HYDROCHLORIDE 0.5 MILLIGRAM(S): 2 INJECTION INTRAMUSCULAR; INTRAVENOUS; SUBCUTANEOUS at 23:36

## 2024-01-27 RX ADMIN — SODIUM CHLORIDE 100 MILLILITER(S): 9 INJECTION INTRAMUSCULAR; INTRAVENOUS; SUBCUTANEOUS at 23:01

## 2024-01-27 RX ADMIN — TIOTROPIUM BROMIDE 2 PUFF(S): 18 CAPSULE ORAL; RESPIRATORY (INHALATION) at 13:54

## 2024-01-27 RX ADMIN — CARVEDILOL PHOSPHATE 25 MILLIGRAM(S): 80 CAPSULE, EXTENDED RELEASE ORAL at 17:14

## 2024-01-27 RX ADMIN — SEVELAMER CARBONATE 800 MILLIGRAM(S): 2400 POWDER, FOR SUSPENSION ORAL at 17:10

## 2024-01-27 RX ADMIN — HYDROMORPHONE HYDROCHLORIDE 0.25 MILLIGRAM(S): 2 INJECTION INTRAMUSCULAR; INTRAVENOUS; SUBCUTANEOUS at 14:20

## 2024-01-27 RX ADMIN — ONDANSETRON 4 MILLIGRAM(S): 8 TABLET, FILM COATED ORAL at 05:59

## 2024-01-27 RX ADMIN — HYDROMORPHONE HYDROCHLORIDE 0.5 MILLIGRAM(S): 2 INJECTION INTRAMUSCULAR; INTRAVENOUS; SUBCUTANEOUS at 09:39

## 2024-01-27 RX ADMIN — SODIUM CHLORIDE 100 MILLILITER(S): 9 INJECTION INTRAMUSCULAR; INTRAVENOUS; SUBCUTANEOUS at 17:16

## 2024-01-27 NOTE — PROGRESS NOTE ADULT - ASSESSMENT
43M PMH HTN, schizophrenia, COPD (4L home O2), and ESRD on HD (MWF) presenting to the ED with nausea, vomting and diarrhea admitted for acute pancreatitis 43M PMH HTN, schizophrenia, COPD (4L home O2), and ESRD on HD (MWF) presenting to the ED with nausea, vomting and diarrhea admitted for acute pancreatitis. RUQ with findings of cholecystitis. Pt evaluated by surgery, plan for OR next week.

## 2024-01-27 NOTE — PROGRESS NOTE ADULT - SUBJECTIVE AND OBJECTIVE BOX
[facility] - HUSSAIN Sanchez / HUSSAIN Richardson / ERNESTO Mehta/ HUSSAIN Muaricio/ HUSSAIN Barber/ IVONNE Hui / JADEN La / BRANDON Alexander / JULIO CÉSAR Gomes  ---------------------------------------------------------------------------------------------------------------  seen and examined today for ESRD  Interval : still with nausea  VITALS:  T(F): 98.3 (01-27-24 @ 12:41), Max: 98.3 (01-26-24 @ 17:09)  HR: 68 (01-27-24 @ 12:41)  BP: 162/74 (01-27-24 @ 12:41)  RR: 17 (01-27-24 @ 12:41)  SpO2: 100% (01-27-24 @ 12:41)  Wt(kg): --    Physical Exam :-  Constitutional: NAD  Neck: Supple.  Respiratory: Bilateral equal breath sounds,  Cardiovascular: S1, S2 normal,  Gastrointestinal: Bowel Sounds present, soft, non tender.  Extremities: No edema  Neurological: Alert and Oriented x 3, no focal deficits  Psychiatric: Normal mood, normal affect  Data:-  Allergies :   shellfish (Anaphylaxis)  shellfish (Rash)  penicillins (Swelling)  fish (Unknown)  penicillin (Anaphylaxis)  Seafood (Hives)  aspirin (Swelling)    Hospital Medications:   MEDICATIONS  (STANDING):  amLODIPine   Tablet 5 milliGRAM(s) Oral daily  atorvastatin 40 milliGRAM(s) Oral at bedtime  budesonide 160 MICROgram(s)/formoterol 4.5 MICROgram(s) Inhaler 2 Puff(s) Inhalation two times a day  calcitriol   Capsule 0.5 MICROGram(s) Oral daily  carvedilol 25 milliGRAM(s) Oral every 12 hours  chlorhexidine 2% Cloths 1 Application(s) Topical daily  ciprofloxacin   IVPB 400 milliGRAM(s) IV Intermittent every 24 hours  epoetin cyndie (PROCRIT) Injectable 4000 Unit(s) IV Push <User Schedule>  escitalopram 10 milliGRAM(s) Oral daily  haloperidol     Tablet 2 milliGRAM(s) Oral daily  heparin   Injectable 5000 Unit(s) SubCutaneous every 8 hours  hydrALAZINE 100 milliGRAM(s) Oral three times a day  hydrOXYzine hydrochloride 25 milliGRAM(s) Oral every 12 hours  metroNIDAZOLE  IVPB 500 milliGRAM(s) IV Intermittent every 8 hours  mirtazapine 15 milliGRAM(s) Oral daily  QUEtiapine  milliGRAM(s) Oral daily  risperiDONE   Tablet 2 milliGRAM(s) Oral two times a day  sevelamer carbonate 800 milliGRAM(s) Oral three times a day with meals  sodium chloride 0.9%. 1000 milliLiter(s) (100 mL/Hr) IV Continuous <Continuous>  tiotropium 2.5 MICROgram(s) Inhaler 2 Puff(s) Inhalation daily  traZODone 100 milliGRAM(s) Oral daily    01-27    132<L>  |  95<L>  |  48<H>  ----------------------------<  101<H>  3.9   |  30  |  7.28<H>    Ca    9.8      27 Jan 2024 10:25  Phos  5.4     01-27  Mg     1.3     01-27    TPro  7.9  /  Alb  3.3<L>  /  TBili  0.5  /  DBili      /  AST  53<H>  /  ALT  86<H>  /  AlkPhos  92  01-27    Creatinine Trend: 7.28 <--, 8.40 <--, 11.50 <--, 11.30 <--                        9.2    6.34  )-----------( 151      ( 27 Jan 2024 10:25 )             28.4

## 2024-01-27 NOTE — PROGRESS NOTE ADULT - SUBJECTIVE AND OBJECTIVE BOX
PGY-1 Progress Note discussed with attending    PAGER #: [[phone]] TILL 5:00 PM  PLEASE CONTACT ON CALL TEAM:  - On Call Team (Please refer to Janine) FROM 5:00 PM - 8:30PM  - Nightfloat Team FROM 8:30 -7:30 AM    CHIEF COMPLAINT & BRIEF HOSPITAL COURSE:      INTERVAL HPI/OVERNIGHT EVENTS:       MEDICATIONS  (STANDING):  amLODIPine   Tablet 5 milliGRAM(s) Oral daily  atorvastatin 40 milliGRAM(s) Oral at bedtime  budesonide 160 MICROgram(s)/formoterol 4.5 MICROgram(s) Inhaler 2 Puff(s) Inhalation two times a day  calcitriol   Capsule 0.5 MICROGram(s) Oral daily  carvedilol 25 milliGRAM(s) Oral every 12 hours  chlorhexidine 2% Cloths 1 Application(s) Topical daily  ciprofloxacin   IVPB 400 milliGRAM(s) IV Intermittent every 24 hours  epoetin cyndie (PROCRIT) Injectable 4000 Unit(s) IV Push <User Schedule>  escitalopram 10 milliGRAM(s) Oral daily  haloperidol     Tablet 2 milliGRAM(s) Oral daily  heparin   Injectable 5000 Unit(s) SubCutaneous every 8 hours  hydrALAZINE 100 milliGRAM(s) Oral three times a day  hydrOXYzine hydrochloride 25 milliGRAM(s) Oral every 12 hours  metroNIDAZOLE  IVPB 500 milliGRAM(s) IV Intermittent every 8 hours  mirtazapine 15 milliGRAM(s) Oral daily  QUEtiapine  milliGRAM(s) Oral daily  risperiDONE   Tablet 2 milliGRAM(s) Oral two times a day  sevelamer carbonate 800 milliGRAM(s) Oral three times a day with meals  sodium chloride 0.9%. 1000 milliLiter(s) (100 mL/Hr) IV Continuous <Continuous>  tiotropium 2.5 MICROgram(s) Inhaler 2 Puff(s) Inhalation daily  traZODone 100 milliGRAM(s) Oral daily    MEDICATIONS  (PRN):  acetaminophen     Tablet .. 650 milliGRAM(s) Oral every 6 hours PRN Temp greater or equal to 38C (100.4F), Mild Pain (1 - 3)  albuterol    90 MICROgram(s) HFA Inhaler 2 Puff(s) Inhalation every 6 hours PRN Shortness of Breath and/or Wheezing  aluminum hydroxide/magnesium hydroxide/simethicone Suspension 30 milliLiter(s) Oral every 4 hours PRN Dyspepsia  HYDROmorphone  Injectable 0.5 milliGRAM(s) IV Push every 6 hours PRN Severe Pain (7 - 10)  HYDROmorphone  Injectable 0.25 milliGRAM(s) IV Push every 6 hours PRN Moderate Pain (4 - 6)  melatonin 3 milliGRAM(s) Oral at bedtime PRN Insomnia  ondansetron Injectable 4 milliGRAM(s) IV Push every 8 hours PRN Nausea and/or Vomiting      REVIEW OF SYSTEMS:  CONSTITUTIONAL: No fever, weight loss, or fatigue  RESPIRATORY: No shortness of breath  CARDIOVASCULAR: No chest pain  GASTROINTESTINAL: No abdominal pain.  GENITOURINARY: No dysuria  NEUROLOGICAL: No headaches  SKIN: No itching, burning, rashes    Vital Signs Last 24 Hrs  T(C): 36.3 (27 Jan 2024 05:13), Max: 36.8 (26 Jan 2024 17:09)  T(F): 97.4 (27 Jan 2024 05:13), Max: 98.3 (26 Jan 2024 17:09)  HR: 59 (27 Jan 2024 05:13) (59 - 69)  BP: 161/77 (27 Jan 2024 05:13) (153/79 - 192/117)  BP(mean): 143 (26 Jan 2024 21:21) (143 - 143)  RR: 16 (27 Jan 2024 05:13) (16 - 19)  SpO2: 100% (27 Jan 2024 05:13) (97% - 100%)    Parameters below as of 27 Jan 2024 05:13  Patient On (Oxygen Delivery Method): nasal cannula  O2 Flow (L/min): 3      PHYSICAL EXAMINATION:  GENERAL: NAD, well built  HEAD:  Atraumatic, Normocephalic  EYES:  conjunctiva and sclera clear  CHEST/LUNG: Clear to auscultation. No rales, rhonchi, wheezing, or rubs  HEART: Regular rate and rhythm; No murmurs, rubs, or gallops  ABDOMEN: Soft, Nontender, Nondistended; Bowel sounds present  NERVOUS SYSTEM:  Alert & Oriented X3,    EXTREMITIES:  2+ Peripheral Pulses, No clubbing, cyanosis, or edema  SKIN: warm dry                          8.7    5.26  )-----------( 146      ( 26 Jan 2024 07:40 )             26.3     01-26    130<L>  |  94<L>  |  70<H>  ----------------------------<  92  3.9   |  27  |  8.40<H>    Ca    10.4      26 Jan 2024 07:40  Phos  5.2     01-26  Mg     2.4     01-26    TPro  7.8  /  Alb  3.1<L>  /  TBili  0.5  /  DBili  x   /  AST  69<H>  /  ALT  98<H>  /  AlkPhos  96  01-26    LIVER FUNCTIONS - ( 26 Jan 2024 07:40 )  Alb: 3.1 g/dL / Pro: 7.8 g/dL / ALK PHOS: 96 U/L / ALT: 98 U/L DA / AST: 69 U/L / GGT: x                   CAPILLARY BLOOD GLUCOSE      RADIOLOGY & ADDITIONAL TESTS:                   PGY-1 Progress Note discussed with attending    PAGER #: [[phone]] TILL 5:00 PM  PLEASE CONTACT ON CALL TEAM:  - On Call Team (Please refer to Janine) FROM 5:00 PM - 8:30PM  - Nightfloat Team FROM 8:30 -7:30 AM    CHIEF COMPLAINT & BRIEF HOSPITAL COURSE:  Pancreatitis, ESRD, Schizophrenia    INTERVAL HPI/OVERNIGHT EVENTS:   No acute events overnight.  Patient examined at bedside this AM.  Patient endorses intense pain in the RUQ and epigastric areas. Asking for his PRN pain medications.    MEDICATIONS  (STANDING):  amLODIPine   Tablet 5 milliGRAM(s) Oral daily  atorvastatin 40 milliGRAM(s) Oral at bedtime  budesonide 160 MICROgram(s)/formoterol 4.5 MICROgram(s) Inhaler 2 Puff(s) Inhalation two times a day  calcitriol   Capsule 0.5 MICROGram(s) Oral daily  carvedilol 25 milliGRAM(s) Oral every 12 hours  chlorhexidine 2% Cloths 1 Application(s) Topical daily  ciprofloxacin   IVPB 400 milliGRAM(s) IV Intermittent every 24 hours  epoetin cyndie (PROCRIT) Injectable 4000 Unit(s) IV Push <User Schedule>  escitalopram 10 milliGRAM(s) Oral daily  haloperidol     Tablet 2 milliGRAM(s) Oral daily  heparin   Injectable 5000 Unit(s) SubCutaneous every 8 hours  hydrALAZINE 100 milliGRAM(s) Oral three times a day  hydrOXYzine hydrochloride 25 milliGRAM(s) Oral every 12 hours  metroNIDAZOLE  IVPB 500 milliGRAM(s) IV Intermittent every 8 hours  mirtazapine 15 milliGRAM(s) Oral daily  QUEtiapine  milliGRAM(s) Oral daily  risperiDONE   Tablet 2 milliGRAM(s) Oral two times a day  sevelamer carbonate 800 milliGRAM(s) Oral three times a day with meals  sodium chloride 0.9%. 1000 milliLiter(s) (100 mL/Hr) IV Continuous <Continuous>  tiotropium 2.5 MICROgram(s) Inhaler 2 Puff(s) Inhalation daily  traZODone 100 milliGRAM(s) Oral daily    MEDICATIONS  (PRN):  acetaminophen     Tablet .. 650 milliGRAM(s) Oral every 6 hours PRN Temp greater or equal to 38C (100.4F), Mild Pain (1 - 3)  albuterol    90 MICROgram(s) HFA Inhaler 2 Puff(s) Inhalation every 6 hours PRN Shortness of Breath and/or Wheezing  aluminum hydroxide/magnesium hydroxide/simethicone Suspension 30 milliLiter(s) Oral every 4 hours PRN Dyspepsia  HYDROmorphone  Injectable 0.5 milliGRAM(s) IV Push every 6 hours PRN Severe Pain (7 - 10)  HYDROmorphone  Injectable 0.25 milliGRAM(s) IV Push every 6 hours PRN Moderate Pain (4 - 6)  melatonin 3 milliGRAM(s) Oral at bedtime PRN Insomnia  ondansetron Injectable 4 milliGRAM(s) IV Push every 8 hours PRN Nausea and/or Vomiting      REVIEW OF SYSTEMS:  CONSTITUTIONAL: No fever, weight loss, or fatigue  RESPIRATORY: No shortness of breath  CARDIOVASCULAR: No chest pain  GASTROINTESTINAL: No abdominal pain.  GENITOURINARY: No dysuria  NEUROLOGICAL: No headaches  SKIN: No itching, burning, rashes    Vital Signs Last 24 Hrs  T(C): 36.3 (27 Jan 2024 05:13), Max: 36.8 (26 Jan 2024 17:09)  T(F): 97.4 (27 Jan 2024 05:13), Max: 98.3 (26 Jan 2024 17:09)  HR: 59 (27 Jan 2024 05:13) (59 - 69)  BP: 161/77 (27 Jan 2024 05:13) (153/79 - 192/117)  BP(mean): 143 (26 Jan 2024 21:21) (143 - 143)  RR: 16 (27 Jan 2024 05:13) (16 - 19)  SpO2: 100% (27 Jan 2024 05:13) (97% - 100%)    Parameters below as of 27 Jan 2024 05:13  Patient On (Oxygen Delivery Method): nasal cannula  O2 Flow (L/min): 3      PHYSICAL EXAMINATION:  GENERAL: NAD, well built  HEAD:  Atraumatic, Normocephalic  EYES:  conjunctiva and sclera clear  CHEST/LUNG: Clear to auscultation. No rales, rhonchi, wheezing, or rubs  HEART: Regular rate and rhythm; No murmurs, rubs, or gallops  ABDOMEN: Soft, Nontender, Nondistended; Bowel sounds present  NERVOUS SYSTEM:  Alert & Oriented X3,    EXTREMITIES:  2+ Peripheral Pulses, No clubbing, cyanosis, or edema  SKIN: warm dry                          8.7    5.26  )-----------( 146      ( 26 Jan 2024 07:40 )             26.3     01-26    130<L>  |  94<L>  |  70<H>  ----------------------------<  92  3.9   |  27  |  8.40<H>    Ca    10.4      26 Jan 2024 07:40  Phos  5.2     01-26  Mg     2.4     01-26    TPro  7.8  /  Alb  3.1<L>  /  TBili  0.5  /  DBili  x   /  AST  69<H>  /  ALT  98<H>  /  AlkPhos  96  01-26    LIVER FUNCTIONS - ( 26 Jan 2024 07:40 )  Alb: 3.1 g/dL / Pro: 7.8 g/dL / ALK PHOS: 96 U/L / ALT: 98 U/L DA / AST: 69 U/L / GGT: x                   CAPILLARY BLOOD GLUCOSE      RADIOLOGY & ADDITIONAL TESTS:                   PGY-1 Progress Note discussed with attending    PAGER #: [[phone]] TILL 5:00 PM  PLEASE CONTACT ON CALL TEAM:  - On Call Team (Please refer to Janine) FROM 5:00 PM - 8:30PM  - Nightfloat Team FROM 8:30 -7:30 AM    CHIEF COMPLAINT & BRIEF HOSPITAL COURSE:  Pancreatitis, ESRD, Schizophrenia    INTERVAL HPI/OVERNIGHT EVENTS:   No acute events overnight.  Patient examined at bedside this AM.  Patient endorses intense pain in the RUQ and epigastric areas. Asking for his PRN pain medications.    MEDICATIONS  (STANDING):  amLODIPine   Tablet 5 milliGRAM(s) Oral daily  atorvastatin 40 milliGRAM(s) Oral at bedtime  budesonide 160 MICROgram(s)/formoterol 4.5 MICROgram(s) Inhaler 2 Puff(s) Inhalation two times a day  calcitriol   Capsule 0.5 MICROGram(s) Oral daily  carvedilol 25 milliGRAM(s) Oral every 12 hours  chlorhexidine 2% Cloths 1 Application(s) Topical daily  ciprofloxacin   IVPB 400 milliGRAM(s) IV Intermittent every 24 hours  epoetin cyndie (PROCRIT) Injectable 4000 Unit(s) IV Push <User Schedule>  escitalopram 10 milliGRAM(s) Oral daily  haloperidol     Tablet 2 milliGRAM(s) Oral daily  heparin   Injectable 5000 Unit(s) SubCutaneous every 8 hours  hydrALAZINE 100 milliGRAM(s) Oral three times a day  hydrOXYzine hydrochloride 25 milliGRAM(s) Oral every 12 hours  metroNIDAZOLE  IVPB 500 milliGRAM(s) IV Intermittent every 8 hours  mirtazapine 15 milliGRAM(s) Oral daily  QUEtiapine  milliGRAM(s) Oral daily  risperiDONE   Tablet 2 milliGRAM(s) Oral two times a day  sevelamer carbonate 800 milliGRAM(s) Oral three times a day with meals  sodium chloride 0.9%. 1000 milliLiter(s) (100 mL/Hr) IV Continuous <Continuous>  tiotropium 2.5 MICROgram(s) Inhaler 2 Puff(s) Inhalation daily  traZODone 100 milliGRAM(s) Oral daily    MEDICATIONS  (PRN):  acetaminophen     Tablet .. 650 milliGRAM(s) Oral every 6 hours PRN Temp greater or equal to 38C (100.4F), Mild Pain (1 - 3)  albuterol    90 MICROgram(s) HFA Inhaler 2 Puff(s) Inhalation every 6 hours PRN Shortness of Breath and/or Wheezing  aluminum hydroxide/magnesium hydroxide/simethicone Suspension 30 milliLiter(s) Oral every 4 hours PRN Dyspepsia  HYDROmorphone  Injectable 0.5 milliGRAM(s) IV Push every 6 hours PRN Severe Pain (7 - 10)  HYDROmorphone  Injectable 0.25 milliGRAM(s) IV Push every 6 hours PRN Moderate Pain (4 - 6)  melatonin 3 milliGRAM(s) Oral at bedtime PRN Insomnia  ondansetron Injectable 4 milliGRAM(s) IV Push every 8 hours PRN Nausea and/or Vomiting      REVIEW OF SYSTEMS:  CONSTITUTIONAL: No fever, weight loss, or fatigue  RESPIRATORY: No shortness of breath  CARDIOVASCULAR: No chest pain  GASTROINTESTINAL: No abdominal pain.  GENITOURINARY: No dysuria  NEUROLOGICAL: No headaches  SKIN: No itching, burning, rashes    Vital Signs Last 24 Hrs  T(C): 36.3 (27 Jan 2024 05:13), Max: 36.8 (26 Jan 2024 17:09)  T(F): 97.4 (27 Jan 2024 05:13), Max: 98.3 (26 Jan 2024 17:09)  HR: 59 (27 Jan 2024 05:13) (59 - 69)  BP: 161/77 (27 Jan 2024 05:13) (153/79 - 192/117)  BP(mean): 143 (26 Jan 2024 21:21) (143 - 143)  RR: 16 (27 Jan 2024 05:13) (16 - 19)  SpO2: 100% (27 Jan 2024 05:13) (97% - 100%)    Parameters below as of 27 Jan 2024 05:13  Patient On (Oxygen Delivery Method): nasal cannula  O2 Flow (L/min): 3      PHYSICAL EXAMINATION:  GENERAL: moderate distress, lying in bed, obese  HEAD:  Atraumatic, Normocephalic  EYES:  conjunctiva and sclera clear  CHEST/LUNG: Clear to auscultation. No rales, rhonchi, wheezing, or rubs  HEART: Regular rate and rhythm; No murmurs, rubs, or gallops  ABDOMEN: +Tenderness in RUQ and epigastrum; Bowel sounds present  NERVOUS SYSTEM:  Alert & Oriented X3,    EXTREMITIES:  2+ Peripheral Pulses, No clubbing, cyanosis, or edema  SKIN: warm dry                          8.7    5.26  )-----------( 146      ( 26 Jan 2024 07:40 )             26.3     01-26    130<L>  |  94<L>  |  70<H>  ----------------------------<  92  3.9   |  27  |  8.40<H>    Ca    10.4      26 Jan 2024 07:40  Phos  5.2     01-26  Mg     2.4     01-26    TPro  7.8  /  Alb  3.1<L>  /  TBili  0.5  /  DBili  x   /  AST  69<H>  /  ALT  98<H>  /  AlkPhos  96  01-26    LIVER FUNCTIONS - ( 26 Jan 2024 07:40 )  Alb: 3.1 g/dL / Pro: 7.8 g/dL / ALK PHOS: 96 U/L / ALT: 98 U/L DA / AST: 69 U/L / GGT: x                   CAPILLARY BLOOD GLUCOSE      RADIOLOGY & ADDITIONAL TESTS:

## 2024-01-27 NOTE — PROGRESS NOTE ADULT - ASSESSMENT
# ESRD.  HD MWF.  scheduled for HD Monday after possible MRCP  # anemia of CKD epogen on hemodialysis  # RENAL OSTEODYSTROPHY. PHOS IS HIGH.  RENVELA WHEN ON A DIET. IN LIGHT OF N&V D/C SENSIPAR.  # HTN. blood pressure high in light of pain. pain control  incr hydralazine 50>>100mg       For any question, call:  Cell # [phone]  Pager # [phone]  Callback # [phone]

## 2024-01-27 NOTE — PROGRESS NOTE ADULT - PROBLEM SELECTOR PLAN 1
No gallstones seen on RUQ US  p/w with nausea, vomiting, and diarrhea x 1 week  no recent travel, no foods or sick contacts  umbilical, epigastric and RUQ abdominal regions TTP on admission  lipase 304    CT AP: -Mild nodular contour of the liver may represent cirrhosis. Splenomegaly and mild varices, suggestive of   associated portal hypertension.   -Mild stranding adjacent to the pancreatic head and body, compatible with acute pancreatitis. No evidence for pancreatic necrosis.  -Pericholecystic stranding is again noted.     US RUQ: Cholelithiasis with diffuse gallbladder wall thickening. Possible acute cholecystitis  NPO, advance as tolerated  pain control, tylenol for mild, 0,25 dilaudid q6 for moderate and 0.5 dilaudid q6 for severe pain  1/26 cont IV fluids for 24 hours at 100cc/hr  -Lap javier next week No gallstones seen on RUQ US  p/w with nausea, vomiting, and diarrhea x 1 week  no recent travel, no foods or sick contacts  umbilical, epigastric and RUQ abdominal regions TTP on admission  lipase 304    CT AP: -Mild nodular contour of the liver may represent cirrhosis. Splenomegaly and mild varices, suggestive of   associated portal hypertension.   -Mild stranding adjacent to the pancreatic head and body, compatible with acute pancreatitis. No evidence for pancreatic necrosis.  -Pericholecystic stranding is again noted.   US RUQ: Cholelithiasis with diffuse gallbladder wall thickening. Possible acute cholecystitis  CLD, advance diet as tolerated  pain control, tylenol for mild, 0,25 dilaudid q6 for moderate and 0.5 dilaudid q6 for severe pain  cont IV fluids for 24 hours at 100cc/hr  -Edith javier Tuesday 1/30

## 2024-01-27 NOTE — PROGRESS NOTE ADULT - SUBJECTIVE AND OBJECTIVE BOX
Pt s- new complaints. On and off abd pain. no n/v  ICU Vital Signs Last 24 Hrs  T(C): 36.8 (27 Jan 2024 12:41), Max: 36.8 (26 Jan 2024 17:09)  T(F): 98.3 (27 Jan 2024 12:41), Max: 98.3 (26 Jan 2024 17:09)  HR: 68 (27 Jan 2024 12:41) (59 - 69)  BP: 162/74 (27 Jan 2024 12:41) (153/79 - 192/117)  BP(mean): 143 (26 Jan 2024 21:21) (143 - 143)  ABP: --  ABP(mean): --  RR: 17 (27 Jan 2024 12:41) (16 - 19)  SpO2: 100% (27 Jan 2024 12:41) (97% - 100%)    O2 Parameters below as of 27 Jan 2024 12:41  Patient On (Oxygen Delivery Method): room air        Alert nad  Abd: obese, soft, mild upper abd tendeness no guarding no rebound tenderness                          9.2    6.34  )-----------( 151      ( 27 Jan 2024 10:25 )             28.4   01-27    132<L>  |  95<L>  |  48<H>  ----------------------------<  101<H>  3.9   |  30  |  7.28<H>    Ca    9.8      27 Jan 2024 10:25  Phos  5.4     01-27  Mg     1.3     01-27    TPro  7.9  /  Alb  3.3<L>  /  TBili  0.5  /  DBili  x   /  AST  53<H>  /  ALT  86<H>  /  AlkPhos  92  01-27  < from: US Abdomen Upper Quadrant Right (01.26.24 @ 09:22) >  ACC: [id number] EXAM:  US ABDOMEN RT UPR QUADRANT   ORDERED BY: CATHIE HORTA     *** ADDENDUM # 1 ***    Please note the report contains a voice transcription error. The   corrected line should read as follows: there is NO evidence of   cholelithiasis. Gallbladder wall thickening is noted. If there is concern   for cholecystitis HIDA scan can be obtained    --- End of Report ---    *** END OF ADDENDUM # 1 ***      PROCEDURE DATE:  01/26/2024          INTERPRETATION:  CLINICAL INFORMATION: Acute pancreatitis. Evaluate for   gallbladder disease.    COMPARISON: None available.    TECHNIQUE: Sonography of the right upper quadrant.    FINDINGS:  Liver: Enlarged (21.3 cm), homogeneous echotexture  Bile ducts: Common duct top normal in caliber at 6 mm. Correlate with   LFTs. If necessary, MRCP can be obtained.  Gallbladder: Cholelithiasis with diffuse gallbladder wall thickening.   Correlate with symptomatology. If there is concern for cholecystitis HIDA   scan can be obtained.  Pancreas: Visualized portions are within normal limits.  Right kidney: 6.6 cm. Atrophic and echogenic compatible with chronic   medical renal disease No hydronephrosis.  Ascites: None.  IVC: Visualized portions are within normal limits.    IMPRESSION:  Cholelithiasis. Possible acute cholecystitis. Consider HIDA scan if   symptomatology warrants.  Common duct top  normal. Correlate with LFTs if necessary, MRCP.  Additional findings as discussed      --- End of Report ---    ***Please see the addendum at the top of this report. It may contain   additional important information or changes.****    < end of copied text >

## 2024-01-27 NOTE — PROGRESS NOTE ADULT - ASSESSMENT
pancreatitis  clinically improved since iadmssion  no GB stone seen on sono    MCRCP ordered will follow  consider HIDA scan if MRI inconclusive  observation

## 2024-01-27 NOTE — PROGRESS NOTE ADULT - PROBLEM SELECTOR PLAN 2
h/o ESRD on HD MWF  last HD session on Monday (only completed 3 hours due to GI symptoms) and missed his HD on Wednesday  follows Dr. La, will consult to resume HD  AV Fistula in RUE  monitor fluid status while pt is receiving fluids

## 2024-01-27 NOTE — PROGRESS NOTE ADULT - PROBLEM SELECTOR PLAN 3
h/o HTN  takes coreg, hydralazine, lasix and amlodipine  will stop lasix as pt no longer makes urine with ESRD  will resume coreg, hydralazine and amlodipine with parameters  monitor BP

## 2024-01-28 LAB
ALBUMIN SERPL ELPH-MCNC: 3.2 G/DL — LOW (ref 3.5–5)
ALP SERPL-CCNC: 89 U/L — SIGNIFICANT CHANGE UP (ref 40–120)
ALT FLD-CCNC: 73 U/L DA — HIGH (ref 10–60)
ANION GAP SERPL CALC-SCNC: 7 MMOL/L — SIGNIFICANT CHANGE UP (ref 5–17)
AST SERPL-CCNC: 35 U/L — SIGNIFICANT CHANGE UP (ref 10–40)
BASOPHILS # BLD AUTO: 0.01 K/UL — SIGNIFICANT CHANGE UP (ref 0–0.2)
BASOPHILS NFR BLD AUTO: 0.2 % — SIGNIFICANT CHANGE UP (ref 0–2)
BILIRUB SERPL-MCNC: 0.4 MG/DL — SIGNIFICANT CHANGE UP (ref 0.2–1.2)
BUN SERPL-MCNC: 51 MG/DL — HIGH (ref 7–18)
CALCIUM SERPL-MCNC: 9.8 MG/DL — SIGNIFICANT CHANGE UP (ref 8.4–10.5)
CHLORIDE SERPL-SCNC: 93 MMOL/L — LOW (ref 96–108)
CO2 SERPL-SCNC: 28 MMOL/L — SIGNIFICANT CHANGE UP (ref 22–31)
CREAT SERPL-MCNC: 8.84 MG/DL — HIGH (ref 0.5–1.3)
EGFR: 7 ML/MIN/1.73M2 — LOW
EOSINOPHIL # BLD AUTO: 0.07 K/UL — SIGNIFICANT CHANGE UP (ref 0–0.5)
EOSINOPHIL NFR BLD AUTO: 1.3 % — SIGNIFICANT CHANGE UP (ref 0–6)
GLUCOSE BLDC GLUCOMTR-MCNC: 125 MG/DL — HIGH (ref 70–99)
GLUCOSE SERPL-MCNC: 131 MG/DL — HIGH (ref 70–99)
HCT VFR BLD CALC: 26.4 % — LOW (ref 39–50)
HGB BLD-MCNC: 8.7 G/DL — LOW (ref 13–17)
IMM GRANULOCYTES NFR BLD AUTO: 0.6 % — SIGNIFICANT CHANGE UP (ref 0–0.9)
LYMPHOCYTES # BLD AUTO: 0.68 K/UL — LOW (ref 1–3.3)
LYMPHOCYTES # BLD AUTO: 12.8 % — LOW (ref 13–44)
MAGNESIUM SERPL-MCNC: 2.4 MG/DL — SIGNIFICANT CHANGE UP (ref 1.6–2.6)
MCHC RBC-ENTMCNC: 33 GM/DL — SIGNIFICANT CHANGE UP (ref 32–36)
MCHC RBC-ENTMCNC: 33.1 PG — SIGNIFICANT CHANGE UP (ref 27–34)
MCV RBC AUTO: 100.4 FL — HIGH (ref 80–100)
MONOCYTES # BLD AUTO: 0.64 K/UL — SIGNIFICANT CHANGE UP (ref 0–0.9)
MONOCYTES NFR BLD AUTO: 12 % — SIGNIFICANT CHANGE UP (ref 2–14)
NEUTROPHILS # BLD AUTO: 3.89 K/UL — SIGNIFICANT CHANGE UP (ref 1.8–7.4)
NEUTROPHILS NFR BLD AUTO: 73.1 % — SIGNIFICANT CHANGE UP (ref 43–77)
NRBC # BLD: 0 /100 WBCS — SIGNIFICANT CHANGE UP (ref 0–0)
PHOSPHATE SERPL-MCNC: 6.4 MG/DL — HIGH (ref 2.5–4.5)
PLATELET # BLD AUTO: 162 K/UL — SIGNIFICANT CHANGE UP (ref 150–400)
POTASSIUM SERPL-MCNC: 4.5 MMOL/L — SIGNIFICANT CHANGE UP (ref 3.5–5.3)
POTASSIUM SERPL-SCNC: 4.5 MMOL/L — SIGNIFICANT CHANGE UP (ref 3.5–5.3)
PROT SERPL-MCNC: 7.8 G/DL — SIGNIFICANT CHANGE UP (ref 6–8.3)
RBC # BLD: 2.63 M/UL — LOW (ref 4.2–5.8)
RBC # FLD: 13.8 % — SIGNIFICANT CHANGE UP (ref 10.3–14.5)
SODIUM SERPL-SCNC: 128 MMOL/L — LOW (ref 135–145)
WBC # BLD: 5.32 K/UL — SIGNIFICANT CHANGE UP (ref 3.8–10.5)
WBC # FLD AUTO: 5.32 K/UL — SIGNIFICANT CHANGE UP (ref 3.8–10.5)

## 2024-01-28 PROCEDURE — 74181 MRI ABDOMEN W/O CONTRAST: CPT | Mod: 26

## 2024-01-28 PROCEDURE — 99232 SBSQ HOSP IP/OBS MODERATE 35: CPT

## 2024-01-28 RX ORDER — SODIUM CHLORIDE 9 MG/ML
1000 INJECTION INTRAMUSCULAR; INTRAVENOUS; SUBCUTANEOUS
Refills: 0 | Status: DISCONTINUED | OUTPATIENT
Start: 2024-01-28 | End: 2024-01-29

## 2024-01-28 RX ADMIN — Medication 100 MILLIGRAM(S): at 13:44

## 2024-01-28 RX ADMIN — CHLORHEXIDINE GLUCONATE 1 APPLICATION(S): 213 SOLUTION TOPICAL at 14:37

## 2024-01-28 RX ADMIN — BUDESONIDE AND FORMOTEROL FUMARATE DIHYDRATE 2 PUFF(S): 160; 4.5 AEROSOL RESPIRATORY (INHALATION) at 09:02

## 2024-01-28 RX ADMIN — RISPERIDONE 2 MILLIGRAM(S): 4 TABLET ORAL at 06:32

## 2024-01-28 RX ADMIN — QUETIAPINE FUMARATE 300 MILLIGRAM(S): 200 TABLET, FILM COATED ORAL at 11:12

## 2024-01-28 RX ADMIN — Medication 100 MILLIGRAM(S): at 11:12

## 2024-01-28 RX ADMIN — ATORVASTATIN CALCIUM 40 MILLIGRAM(S): 80 TABLET, FILM COATED ORAL at 21:41

## 2024-01-28 RX ADMIN — CARVEDILOL PHOSPHATE 25 MILLIGRAM(S): 80 CAPSULE, EXTENDED RELEASE ORAL at 17:00

## 2024-01-28 RX ADMIN — SODIUM CHLORIDE 100 MILLILITER(S): 9 INJECTION INTRAMUSCULAR; INTRAVENOUS; SUBCUTANEOUS at 17:45

## 2024-01-28 RX ADMIN — HALOPERIDOL DECANOATE 2 MILLIGRAM(S): 100 INJECTION INTRAMUSCULAR at 11:11

## 2024-01-28 RX ADMIN — Medication 100 MILLIGRAM(S): at 21:42

## 2024-01-28 RX ADMIN — TIOTROPIUM BROMIDE 2 PUFF(S): 18 CAPSULE ORAL; RESPIRATORY (INHALATION) at 11:13

## 2024-01-28 RX ADMIN — SEVELAMER CARBONATE 800 MILLIGRAM(S): 2400 POWDER, FOR SUSPENSION ORAL at 08:36

## 2024-01-28 RX ADMIN — SEVELAMER CARBONATE 800 MILLIGRAM(S): 2400 POWDER, FOR SUSPENSION ORAL at 12:25

## 2024-01-28 RX ADMIN — Medication 100 MILLIGRAM(S): at 06:31

## 2024-01-28 RX ADMIN — SODIUM CHLORIDE 100 MILLILITER(S): 9 INJECTION INTRAMUSCULAR; INTRAVENOUS; SUBCUTANEOUS at 11:18

## 2024-01-28 RX ADMIN — Medication 25 MILLIGRAM(S): at 17:00

## 2024-01-28 RX ADMIN — HEPARIN SODIUM 5000 UNIT(S): 5000 INJECTION INTRAVENOUS; SUBCUTANEOUS at 06:32

## 2024-01-28 RX ADMIN — Medication 100 MILLIGRAM(S): at 06:33

## 2024-01-28 RX ADMIN — HEPARIN SODIUM 5000 UNIT(S): 5000 INJECTION INTRAVENOUS; SUBCUTANEOUS at 21:42

## 2024-01-28 RX ADMIN — CARVEDILOL PHOSPHATE 25 MILLIGRAM(S): 80 CAPSULE, EXTENDED RELEASE ORAL at 06:32

## 2024-01-28 RX ADMIN — HYDROMORPHONE HYDROCHLORIDE 0.25 MILLIGRAM(S): 2 INJECTION INTRAMUSCULAR; INTRAVENOUS; SUBCUTANEOUS at 09:00

## 2024-01-28 RX ADMIN — HEPARIN SODIUM 5000 UNIT(S): 5000 INJECTION INTRAVENOUS; SUBCUTANEOUS at 13:44

## 2024-01-28 RX ADMIN — Medication 25 MILLIGRAM(S): at 06:32

## 2024-01-28 RX ADMIN — Medication 200 MILLIGRAM(S): at 20:00

## 2024-01-28 RX ADMIN — Medication 100 MILLIGRAM(S): at 13:45

## 2024-01-28 RX ADMIN — ESCITALOPRAM OXALATE 10 MILLIGRAM(S): 10 TABLET, FILM COATED ORAL at 11:12

## 2024-01-28 RX ADMIN — MIRTAZAPINE 15 MILLIGRAM(S): 45 TABLET, ORALLY DISINTEGRATING ORAL at 11:12

## 2024-01-28 RX ADMIN — HYDROMORPHONE HYDROCHLORIDE 0.5 MILLIGRAM(S): 2 INJECTION INTRAMUSCULAR; INTRAVENOUS; SUBCUTANEOUS at 06:30

## 2024-01-28 RX ADMIN — SEVELAMER CARBONATE 800 MILLIGRAM(S): 2400 POWDER, FOR SUSPENSION ORAL at 16:59

## 2024-01-28 RX ADMIN — AMLODIPINE BESYLATE 5 MILLIGRAM(S): 2.5 TABLET ORAL at 06:32

## 2024-01-28 RX ADMIN — HYDROMORPHONE HYDROCHLORIDE 0.5 MILLIGRAM(S): 2 INJECTION INTRAMUSCULAR; INTRAVENOUS; SUBCUTANEOUS at 12:25

## 2024-01-28 RX ADMIN — RISPERIDONE 2 MILLIGRAM(S): 4 TABLET ORAL at 17:00

## 2024-01-28 RX ADMIN — CALCITRIOL 0.5 MICROGRAM(S): 0.5 CAPSULE ORAL at 11:11

## 2024-01-28 RX ADMIN — HYDROMORPHONE HYDROCHLORIDE 0.5 MILLIGRAM(S): 2 INJECTION INTRAMUSCULAR; INTRAVENOUS; SUBCUTANEOUS at 18:29

## 2024-01-28 RX ADMIN — HYDROMORPHONE HYDROCHLORIDE 0.5 MILLIGRAM(S): 2 INJECTION INTRAMUSCULAR; INTRAVENOUS; SUBCUTANEOUS at 02:45

## 2024-01-28 NOTE — DIETITIAN INITIAL EVALUATION ADULT - NAME AND PHONE
RD business card/Food&Nutrition # given for contact as needed; Pt to be followed by dietitian at out-pt dialysis center when medically ready to be discharged

## 2024-01-28 NOTE — PROGRESS NOTE ADULT - ASSESSMENT
43M PMH HTN, schizophrenia, COPD (4L home O2), and ESRD on HD (MWF) presenting to the ED with nausea, vomting and diarrhea admitted for acute pancreatitis. RUQ with findings of cholecystitis. Pt evaluated by surgery, plan for OR next week.      Assessment:  #pancreatitis  #Cholecystitis  #hyponatremia  #ESRD  #HTn  #HLD  #COPD  #schizophrenia    Plan:  - Continue cipro and flagyl  - Advance diet as tolerated  - IV fluids, carefully due to hx of ESRD  - f/u MRCP   - HD on MWF as schedule, Dr. La consulted  - continue BP meds, monitor BP  - Pain medication as needed.   - O2 oxygen as needed, continue home medications (inhalers0  - Surgery consulted, plan for cholecystectomy next week.  - heparin sq .     43M PMH HTN, schizophrenia, COPD (4L home O2), and ESRD on HD (MWF) presenting to the ED with nausea, vomting and diarrhea admitted for acute pancreatitis. RUQ with findings of cholecystitis. Pt evaluated by surgery, plan for OR next week.      Assessment:  #pancreatitis  #Cholecystitis  #hyponatremia  #ESRD  #HTn  #HLD  #COPD  #schizophrenia    Plan:  - Continue cipro and flagyl  - NPO for now, except meds, sips of water  - hyponatremia multifactorial in setting of psych meds, pain, ESRD, vomiting and dehydration, nephro aware  - IV fluids, carefully due to hx of ESRD  - f/u MRCP unclear if any good image was obtained, had to be aborted due to anxiety/claustrophobia mid way study  - HD on MWF as schedule, Dr. La consulted  - continue BP meds, monitor BP  - Pain medication as needed.   - O2 oxygen as needed, continue home medications (inhalers0  - Surgery consulted, plan for cholecystectomy next week.  - continue psych home meds  - heparin sq .     43M PMH HTN, schizophrenia, COPD (4L home O2), and ESRD on HD (MWF) presenting to the ED with nausea, vomting and diarrhea admitted for acute pancreatitis. RUQ with findings of cholecystitis. Pt evaluated by surgery, plan for OR next week.      Assessment:  #pancreatitis  #Cholecystitis  #hyponatremia  #ESRD  #HTn  #HLD  #COPD  #schizophrenia    Plan:  - Continue cipro and flagyl  - NPO for now, except meds, sips of water, fingersticks q 6  - hyponatremia multifactorial in setting of psych meds, pain, ESRD, vomiting and dehydration, nephro aware  - IV fluids, carefully due to hx of ESRD  - f/u MRCP unclear if any good image was obtained, had to be aborted due to anxiety/claustrophobia mid way study  - HD on MWF as schedule, Dr. La consulted  - continue BP meds, monitor BP  - Pain medication as needed.   - O2 oxygen as needed, continue home medications (inhalers0  - Surgery consulted, plan for cholecystectomy next week.  - continue psych home meds  - heparin sq .

## 2024-01-28 NOTE — DIETITIAN INITIAL EVALUATION ADULT - DIET TYPE
Advance diet or consider nutritional supplement (NEPRO) if full liquid diet prolonged as medically feasible

## 2024-01-28 NOTE — DIETITIAN INITIAL EVALUATION ADULT - FACTORS AFF FOOD INTAKE
acute on chronic comorbidities, altered GI function of acute pancreatitis, ESRD on HD/Zoroastrianism/ethnic/cultural/personal food preferences

## 2024-01-28 NOTE — PROGRESS NOTE ADULT - ASSESSMENT
43y.o. Male with pancreatitis, questionable cholelithiasis    -f/u MRCP  -Robotic assisted lap javier if MRCP+  -Clears as tolerated  -OOB ambulate  -Pain control prn  -DVT ppx  -Incentive spirometry     This note and its recommendations herein are preliminary until such time as cosigned by an attending.

## 2024-01-28 NOTE — PROGRESS NOTE ADULT - SUBJECTIVE AND OBJECTIVE BOX
INTERVAL HPI/OVERNIGHT EVENTS:   Patient seen and examined at bedside. Patient endorsing worsening abd pain, nausea and vomiting, not tolerating diet as he was yesterday. also endorsing constipation. Denies any other complains at this time. he attempted MRCP but couldn't complete due to claustrophobia and anxiety, unclear if any good image was done.     REVIEW OF SYSTEMS:  CONSTITUTIONAL: No fever, weight loss, or fatigue  RESPIRATORY: No cough, wheezing, chills or hemoptysis; No shortness of breath  CARDIOVASCULAR: No chest pain, palpitations, dizziness, or leg swelling  GASTROINTESTINAL: + nausea, vomiting, abdominal pain.   GENITOURINARY: No dysuria or hematuria, urinary frequency  MUSCULOSKELETAL: No pain, no Limited ROM  NEUROLOGICAL: No headaches, memory loss, loss of strength, numbness, or tremors  SKIN: No itching, burning, rashes, or lesions     MEDICATIONS  (STANDING):  amLODIPine   Tablet 5 milliGRAM(s) Oral daily  atorvastatin 40 milliGRAM(s) Oral at bedtime  budesonide 160 MICROgram(s)/formoterol 4.5 MICROgram(s) Inhaler 2 Puff(s) Inhalation two times a day  calcitriol   Capsule 0.5 MICROGram(s) Oral daily  carvedilol 25 milliGRAM(s) Oral every 12 hours  chlorhexidine 2% Cloths 1 Application(s) Topical daily  ciprofloxacin   IVPB 400 milliGRAM(s) IV Intermittent every 24 hours  epoetin cyndie (PROCRIT) Injectable 4000 Unit(s) IV Push <User Schedule>  escitalopram 10 milliGRAM(s) Oral daily  haloperidol     Tablet 2 milliGRAM(s) Oral daily  heparin   Injectable 5000 Unit(s) SubCutaneous every 8 hours  hydrALAZINE 100 milliGRAM(s) Oral three times a day  hydrOXYzine hydrochloride 25 milliGRAM(s) Oral every 12 hours  metroNIDAZOLE  IVPB 500 milliGRAM(s) IV Intermittent every 8 hours  mirtazapine 15 milliGRAM(s) Oral daily  QUEtiapine  milliGRAM(s) Oral daily  risperiDONE   Tablet 2 milliGRAM(s) Oral two times a day  sevelamer carbonate 800 milliGRAM(s) Oral three times a day with meals  sodium chloride 0.9%. 1000 milliLiter(s) (100 mL/Hr) IV Continuous <Continuous>  tiotropium 2.5 MICROgram(s) Inhaler 2 Puff(s) Inhalation daily  traZODone 100 milliGRAM(s) Oral daily    MEDICATIONS  (PRN):  acetaminophen     Tablet .. 650 milliGRAM(s) Oral every 6 hours PRN Temp greater or equal to 38C (100.4F), Mild Pain (1 - 3)  albuterol    90 MICROgram(s) HFA Inhaler 2 Puff(s) Inhalation every 6 hours PRN Shortness of Breath and/or Wheezing  aluminum hydroxide/magnesium hydroxide/simethicone Suspension 30 milliLiter(s) Oral every 4 hours PRN Dyspepsia  HYDROmorphone  Injectable 0.25 milliGRAM(s) IV Push every 6 hours PRN Moderate Pain (4 - 6)  HYDROmorphone  Injectable 0.5 milliGRAM(s) IV Push every 6 hours PRN Severe Pain (7 - 10)  melatonin 3 milliGRAM(s) Oral at bedtime PRN Insomnia  ondansetron Injectable 4 milliGRAM(s) IV Push every 8 hours PRN Nausea and/or Vomiting      Vital Signs Last 24 Hrs  T(C): 36.8 (28 Jan 2024 14:17), Max: 36.8 (28 Jan 2024 14:17)  T(F): 98.2 (28 Jan 2024 14:17), Max: 98.2 (28 Jan 2024 14:17)  HR: 69 (28 Jan 2024 14:17) (64 - 70)  BP: 140/63 (28 Jan 2024 16:43) (137/80 - 179/80)  BP(mean): --  RR: 18 (28 Jan 2024 14:17) (17 - 18)  SpO2: 98% (28 Jan 2024 14:17) (98% - 100%)    Parameters below as of 28 Jan 2024 14:17  Patient On (Oxygen Delivery Method): nasal cannula  O2 Flow (L/min): 3      PHYSICAL EXAMINATION:  GENERAL: NAD, obese, laying in bed  HEAD:  Atraumatic, Normocephalic  EYES:  conjunctiva and sclera clear, no scleral icterus  NECK: Supple, No JVD, Normal thyroid  CHEST/LUNG: Clear to auscultation.  No rales, rhonchi, wheezing, or rubs  HEART: Regular rate and rhythm; No murmurs, rubs, or gallops  ABDOMEN: BW present, very tender to palpation in Right side/ RUQ  NERVOUS SYSTEM:  Alert & Oriented X3,  Strength 5/5 in upper and lower extremities, sensation intact  EXTREMITIES:  2+ Peripheral Pulses, No clubbing, cyanosis, or edema  SKIN: tattoos                           8.7    5.32  )-----------( 162      ( 28 Jan 2024 10:13 )             26.4     01-28    128<L>  |  93<L>  |  51<H>  ----------------------------<  131<H>  4.5   |  28  |  8.84<H>    Ca    9.8      28 Jan 2024 10:13  Phos  6.4     01-28  Mg     2.4     01-28    TPro  7.8  /  Alb  3.2<L>  /  TBili  0.4  /  DBili  x   /  AST  35  /  ALT  73<H>  /  AlkPhos  89  01-28    LIVER FUNCTIONS - ( 28 Jan 2024 10:13 )  Alb: 3.2 g/dL / Pro: 7.8 g/dL / ALK PHOS: 89 U/L / ALT: 73 U/L DA / AST: 35 U/L / GGT: x                 I&O's Summary        RADIOLOGY & ADDITIONAL TESTS:

## 2024-01-28 NOTE — DIETITIAN INITIAL EVALUATION ADULT - PERTINENT MEDS FT
MEDICATIONS  (STANDING):  amLODIPine   Tablet 5 milliGRAM(s) Oral daily  atorvastatin 40 milliGRAM(s) Oral at bedtime  budesonide 160 MICROgram(s)/formoterol 4.5 MICROgram(s) Inhaler 2 Puff(s) Inhalation two times a day  calcitriol   Capsule 0.5 MICROGram(s) Oral daily  carvedilol 25 milliGRAM(s) Oral every 12 hours  chlorhexidine 2% Cloths 1 Application(s) Topical daily  ciprofloxacin   IVPB 400 milliGRAM(s) IV Intermittent every 24 hours  epoetin cyndie (PROCRIT) Injectable 4000 Unit(s) IV Push <User Schedule>  escitalopram 10 milliGRAM(s) Oral daily  haloperidol     Tablet 2 milliGRAM(s) Oral daily  heparin   Injectable 5000 Unit(s) SubCutaneous every 8 hours  hydrALAZINE 100 milliGRAM(s) Oral three times a day  hydrOXYzine hydrochloride 25 milliGRAM(s) Oral every 12 hours  metroNIDAZOLE  IVPB 500 milliGRAM(s) IV Intermittent every 8 hours  mirtazapine 15 milliGRAM(s) Oral daily  QUEtiapine  milliGRAM(s) Oral daily  risperiDONE   Tablet 2 milliGRAM(s) Oral two times a day  sevelamer carbonate 800 milliGRAM(s) Oral three times a day with meals  sodium chloride 0.9%. 1000 milliLiter(s) (100 mL/Hr) IV Continuous <Continuous>  tiotropium 2.5 MICROgram(s) Inhaler 2 Puff(s) Inhalation daily  traZODone 100 milliGRAM(s) Oral daily    MEDICATIONS  (PRN):  acetaminophen     Tablet .. 650 milliGRAM(s) Oral every 6 hours PRN Temp greater or equal to 38C (100.4F), Mild Pain (1 - 3)  albuterol    90 MICROgram(s) HFA Inhaler 2 Puff(s) Inhalation every 6 hours PRN Shortness of Breath and/or Wheezing  aluminum hydroxide/magnesium hydroxide/simethicone Suspension 30 milliLiter(s) Oral every 4 hours PRN Dyspepsia  HYDROmorphone  Injectable 0.25 milliGRAM(s) IV Push every 6 hours PRN Moderate Pain (4 - 6)  HYDROmorphone  Injectable 0.5 milliGRAM(s) IV Push every 6 hours PRN Severe Pain (7 - 10)  melatonin 3 milliGRAM(s) Oral at bedtime PRN Insomnia  ondansetron Injectable 4 milliGRAM(s) IV Push every 8 hours PRN Nausea and/or Vomiting

## 2024-01-28 NOTE — PROGRESS NOTE ADULT - SUBJECTIVE AND OBJECTIVE BOX
INTERVAL HPI/OVERNIGHT EVENTS:  Pt c/o upper abd pain. c/o thirst and hunger.  Tolerating clear liquid diet.   Mild nausea without vomiting.    MEDICATIONS  (STANDING):  amLODIPine   Tablet 5 milliGRAM(s) Oral daily  atorvastatin 40 milliGRAM(s) Oral at bedtime  budesonide 160 MICROgram(s)/formoterol 4.5 MICROgram(s) Inhaler 2 Puff(s) Inhalation two times a day  calcitriol   Capsule 0.5 MICROGram(s) Oral daily  carvedilol 25 milliGRAM(s) Oral every 12 hours  chlorhexidine 2% Cloths 1 Application(s) Topical daily  ciprofloxacin   IVPB 400 milliGRAM(s) IV Intermittent every 24 hours  epoetin cyndie (PROCRIT) Injectable 4000 Unit(s) IV Push <User Schedule>  escitalopram 10 milliGRAM(s) Oral daily  haloperidol     Tablet 2 milliGRAM(s) Oral daily  heparin   Injectable 5000 Unit(s) SubCutaneous every 8 hours  hydrALAZINE 100 milliGRAM(s) Oral three times a day  hydrOXYzine hydrochloride 25 milliGRAM(s) Oral every 12 hours  metroNIDAZOLE  IVPB 500 milliGRAM(s) IV Intermittent every 8 hours  mirtazapine 15 milliGRAM(s) Oral daily  QUEtiapine  milliGRAM(s) Oral daily  risperiDONE   Tablet 2 milliGRAM(s) Oral two times a day  sevelamer carbonate 800 milliGRAM(s) Oral three times a day with meals  sodium chloride 0.9%. 1000 milliLiter(s) (100 mL/Hr) IV Continuous <Continuous>  tiotropium 2.5 MICROgram(s) Inhaler 2 Puff(s) Inhalation daily  traZODone 100 milliGRAM(s) Oral daily    MEDICATIONS  (PRN):  acetaminophen     Tablet .. 650 milliGRAM(s) Oral every 6 hours PRN Temp greater or equal to 38C (100.4F), Mild Pain (1 - 3)  albuterol    90 MICROgram(s) HFA Inhaler 2 Puff(s) Inhalation every 6 hours PRN Shortness of Breath and/or Wheezing  aluminum hydroxide/magnesium hydroxide/simethicone Suspension 30 milliLiter(s) Oral every 4 hours PRN Dyspepsia  HYDROmorphone  Injectable 0.25 milliGRAM(s) IV Push every 6 hours PRN Moderate Pain (4 - 6)  HYDROmorphone  Injectable 0.5 milliGRAM(s) IV Push every 6 hours PRN Severe Pain (7 - 10)  melatonin 3 milliGRAM(s) Oral at bedtime PRN Insomnia  ondansetron Injectable 4 milliGRAM(s) IV Push every 8 hours PRN Nausea and/or Vomiting    Vital Signs Last 24 Hrs  T(C): 36.3 (28 Jan 2024 04:56), Max: 36.8 (27 Jan 2024 12:41)  T(F): 97.4 (28 Jan 2024 04:56), Max: 98.3 (27 Jan 2024 12:41)  HR: 64 (28 Jan 2024 04:56) (64 - 70)  BP: 161/83 (28 Jan 2024 04:56) (137/80 - 162/74)  BP(mean): --  RR: 17 (28 Jan 2024 04:56) (17 - 18)  SpO2: 100% (28 Jan 2024 04:56) (99% - 100%)    Parameters below as of 28 Jan 2024 04:56  Patient On (Oxygen Delivery Method): nasal cannula  O2 Flow (L/min): 3    Physical:  General: A&Ox3. NAD. Obese  Abdomen: Soft nondistended, RUQ and epigastric tenderness.    LABS:                        9.2    6.34  )-----------( 151      ( 27 Jan 2024 10:25 )             28.4             01-27    132<L>  |  95<L>  |  48<H>  ----------------------------<  101<H>  3.9   |  30  |  7.28<H>    Ca    9.8      27 Jan 2024 10:25  Phos  5.4     01-27  Mg     1.3     01-27    TPro  7.9  /  Alb  3.3<L>  /  TBili  0.5  /  DBili  x   /  AST  53<H>  /  ALT  86<H>  /  AlkPhos  92  01-27

## 2024-01-28 NOTE — DIETITIAN INITIAL EVALUATION ADULT - OTHER INFO
Pt lives home with family PTA, alert, oriented, well-communicated; Appetite good prior to nausea/vomiting/diarrhea x 1wk PTA, still on/off in-house; Tolerating breakfast today; NPO/Liquid diet x 3 to 4d in-house; denied recent wt changes, denied chewing or swallowing problem; Allergy to fish, shellfish, seafood, Almonds; food choices obtained and forwarded to Dietary for when diet advanced, want to have Nepro oral nutritional supplement; ESRD on HD x 6y, followed by dietitian at out-pt dialysis center Pt lives home with family PTA, alert, oriented, well-communicated; Appetite good prior to nausea/vomiting/diarrhea x 1wk PTA, still on/off in-house; NPO/Liquid diet x 3 to 4d in-house, advanced to full liquid diet per MD, tolerating breakfast today; denied recent wt changes, denied chewing or swallowing problem; Allergy to fish, shellfish, seafood, Almonds; food choices obtained and forwarded to Dietary for when diet advanced, want to have Nepro oral nutritional supplement; ESRD on HD x 6y, followed by dietitian at out-pt dialysis center

## 2024-01-28 NOTE — DIETITIAN INITIAL EVALUATION ADULT - NS FNS DIET ORDER
Diet, Full Liquid:   DASH/TLC {Sodium & Cholesterol Restricted}  1200mL Fluid Restriction ([id number])  For patients receiving Renal Replacement - No Protein Restr, No Conc K, No Conc Phos, Low Sodium (RENAL) (01-27-24 @ 12:57)

## 2024-01-28 NOTE — DIETITIAN INITIAL EVALUATION ADULT - ENTER FROM (CAL/KG)
From: Jaja Scruggs  To: Tracy Malik  Sent: 11/7/2021 1:10 PM CST  Subject: Pregnancy test    I know I was to call when my cycle starts, but I had a positive pregnancy test yesterday and today.    22

## 2024-01-28 NOTE — DIETITIAN INITIAL EVALUATION ADULT - PERTINENT LABORATORY DATA
01-27    132<L>  |  95<L>  |  48<H>  ----------------------------<  101<H>  3.9   |  30  |  7.28<H>    Ca    9.8      27 Jan 2024 10:25  Phos  5.4     01-27  Mg     1.3     01-27    TPro  7.9  /  Alb  3.3<L>  /  TBili  0.5  /  DBili  x   /  AST  53<H>  /  ALT  86<H>  /  AlkPhos  92  01-27  A1C with Estimated Average Glucose Result: 4.9 % (02-25-23 @ 05:40)

## 2024-01-29 ENCOUNTER — TRANSCRIPTION ENCOUNTER (OUTPATIENT)
Age: 44
End: 2024-01-29

## 2024-01-29 DIAGNOSIS — K81.9 CHOLECYSTITIS, UNSPECIFIED: ICD-10-CM

## 2024-01-29 LAB
ALBUMIN SERPL ELPH-MCNC: 3.3 G/DL — LOW (ref 3.5–5)
ALP SERPL-CCNC: 85 U/L — SIGNIFICANT CHANGE UP (ref 40–120)
ALT FLD-CCNC: 62 U/L DA — HIGH (ref 10–60)
ANION GAP SERPL CALC-SCNC: 8 MMOL/L — SIGNIFICANT CHANGE UP (ref 5–17)
AST SERPL-CCNC: 29 U/L — SIGNIFICANT CHANGE UP (ref 10–40)
BASOPHILS # BLD AUTO: 0.01 K/UL — SIGNIFICANT CHANGE UP (ref 0–0.2)
BASOPHILS NFR BLD AUTO: 0.2 % — SIGNIFICANT CHANGE UP (ref 0–2)
BILIRUB SERPL-MCNC: 0.5 MG/DL — SIGNIFICANT CHANGE UP (ref 0.2–1.2)
BUN SERPL-MCNC: 55 MG/DL — HIGH (ref 7–18)
CALCIUM SERPL-MCNC: 9.9 MG/DL — SIGNIFICANT CHANGE UP (ref 8.4–10.5)
CHLORIDE SERPL-SCNC: 94 MMOL/L — LOW (ref 96–108)
CO2 SERPL-SCNC: 26 MMOL/L — SIGNIFICANT CHANGE UP (ref 22–31)
CREAT SERPL-MCNC: 10.1 MG/DL — HIGH (ref 0.5–1.3)
EGFR: 6 ML/MIN/1.73M2 — LOW
EOSINOPHIL # BLD AUTO: 0.07 K/UL — SIGNIFICANT CHANGE UP (ref 0–0.5)
EOSINOPHIL NFR BLD AUTO: 1.3 % — SIGNIFICANT CHANGE UP (ref 0–6)
GLUCOSE BLDC GLUCOMTR-MCNC: 123 MG/DL — HIGH (ref 70–99)
GLUCOSE BLDC GLUCOMTR-MCNC: 131 MG/DL — HIGH (ref 70–99)
GLUCOSE BLDC GLUCOMTR-MCNC: 93 MG/DL — SIGNIFICANT CHANGE UP (ref 70–99)
GLUCOSE SERPL-MCNC: 112 MG/DL — HIGH (ref 70–99)
HCT VFR BLD CALC: 27.7 % — LOW (ref 39–50)
HGB BLD-MCNC: 9 G/DL — LOW (ref 13–17)
IMM GRANULOCYTES NFR BLD AUTO: 0.6 % — SIGNIFICANT CHANGE UP (ref 0–0.9)
LYMPHOCYTES # BLD AUTO: 0.45 K/UL — LOW (ref 1–3.3)
LYMPHOCYTES # BLD AUTO: 8.5 % — LOW (ref 13–44)
MAGNESIUM SERPL-MCNC: 2.4 MG/DL — SIGNIFICANT CHANGE UP (ref 1.6–2.6)
MCHC RBC-ENTMCNC: 32.5 GM/DL — SIGNIFICANT CHANGE UP (ref 32–36)
MCHC RBC-ENTMCNC: 33.7 PG — SIGNIFICANT CHANGE UP (ref 27–34)
MCV RBC AUTO: 103.7 FL — HIGH (ref 80–100)
MONOCYTES # BLD AUTO: 0.66 K/UL — SIGNIFICANT CHANGE UP (ref 0–0.9)
MONOCYTES NFR BLD AUTO: 12.5 % — SIGNIFICANT CHANGE UP (ref 2–14)
NEUTROPHILS # BLD AUTO: 4.06 K/UL — SIGNIFICANT CHANGE UP (ref 1.8–7.4)
NEUTROPHILS NFR BLD AUTO: 76.9 % — SIGNIFICANT CHANGE UP (ref 43–77)
NRBC # BLD: 0 /100 WBCS — SIGNIFICANT CHANGE UP (ref 0–0)
PHOSPHATE SERPL-MCNC: 6.7 MG/DL — HIGH (ref 2.5–4.5)
PLATELET # BLD AUTO: 159 K/UL — SIGNIFICANT CHANGE UP (ref 150–400)
POTASSIUM SERPL-MCNC: 5 MMOL/L — SIGNIFICANT CHANGE UP (ref 3.5–5.3)
POTASSIUM SERPL-SCNC: 5 MMOL/L — SIGNIFICANT CHANGE UP (ref 3.5–5.3)
PROT SERPL-MCNC: 8 G/DL — SIGNIFICANT CHANGE UP (ref 6–8.3)
RBC # BLD: 2.67 M/UL — LOW (ref 4.2–5.8)
RBC # FLD: 13.6 % — SIGNIFICANT CHANGE UP (ref 10.3–14.5)
SODIUM SERPL-SCNC: 128 MMOL/L — LOW (ref 135–145)
WBC # BLD: 5.28 K/UL — SIGNIFICANT CHANGE UP (ref 3.8–10.5)
WBC # FLD AUTO: 5.28 K/UL — SIGNIFICANT CHANGE UP (ref 3.8–10.5)

## 2024-01-29 PROCEDURE — 99232 SBSQ HOSP IP/OBS MODERATE 35: CPT | Mod: 57

## 2024-01-29 PROCEDURE — 99233 SBSQ HOSP IP/OBS HIGH 50: CPT | Mod: GC

## 2024-01-29 RX ORDER — SENNA PLUS 8.6 MG/1
2 TABLET ORAL AT BEDTIME
Refills: 0 | Status: DISCONTINUED | OUTPATIENT
Start: 2024-01-29 | End: 2024-02-02

## 2024-01-29 RX ORDER — POLYETHYLENE GLYCOL 3350 17 G/17G
17 POWDER, FOR SOLUTION ORAL DAILY
Refills: 0 | Status: DISCONTINUED | OUTPATIENT
Start: 2024-01-29 | End: 2024-02-02

## 2024-01-29 RX ORDER — SODIUM CHLORIDE 9 MG/ML
1000 INJECTION INTRAMUSCULAR; INTRAVENOUS; SUBCUTANEOUS
Refills: 0 | Status: DISCONTINUED | OUTPATIENT
Start: 2024-01-29 | End: 2024-01-30

## 2024-01-29 RX ADMIN — Medication 100 MILLIGRAM(S): at 11:18

## 2024-01-29 RX ADMIN — CHLORHEXIDINE GLUCONATE 1 APPLICATION(S): 213 SOLUTION TOPICAL at 11:20

## 2024-01-29 RX ADMIN — QUETIAPINE FUMARATE 300 MILLIGRAM(S): 200 TABLET, FILM COATED ORAL at 11:19

## 2024-01-29 RX ADMIN — Medication 100 MILLIGRAM(S): at 13:45

## 2024-01-29 RX ADMIN — Medication 100 MILLIGRAM(S): at 21:53

## 2024-01-29 RX ADMIN — Medication 100 MILLIGRAM(S): at 13:40

## 2024-01-29 RX ADMIN — HALOPERIDOL DECANOATE 2 MILLIGRAM(S): 100 INJECTION INTRAMUSCULAR at 11:18

## 2024-01-29 RX ADMIN — HYDROMORPHONE HYDROCHLORIDE 0.25 MILLIGRAM(S): 2 INJECTION INTRAMUSCULAR; INTRAVENOUS; SUBCUTANEOUS at 13:39

## 2024-01-29 RX ADMIN — HYDROMORPHONE HYDROCHLORIDE 0.5 MILLIGRAM(S): 2 INJECTION INTRAMUSCULAR; INTRAVENOUS; SUBCUTANEOUS at 23:23

## 2024-01-29 RX ADMIN — HEPARIN SODIUM 5000 UNIT(S): 5000 INJECTION INTRAVENOUS; SUBCUTANEOUS at 21:53

## 2024-01-29 RX ADMIN — HYDROMORPHONE HYDROCHLORIDE 0.5 MILLIGRAM(S): 2 INJECTION INTRAMUSCULAR; INTRAVENOUS; SUBCUTANEOUS at 23:43

## 2024-01-29 RX ADMIN — Medication 100 MILLIGRAM(S): at 21:54

## 2024-01-29 RX ADMIN — SODIUM CHLORIDE 100 MILLILITER(S): 9 INJECTION INTRAMUSCULAR; INTRAVENOUS; SUBCUTANEOUS at 23:30

## 2024-01-29 RX ADMIN — ESCITALOPRAM OXALATE 10 MILLIGRAM(S): 10 TABLET, FILM COATED ORAL at 11:18

## 2024-01-29 RX ADMIN — RISPERIDONE 2 MILLIGRAM(S): 4 TABLET ORAL at 05:19

## 2024-01-29 RX ADMIN — HYDROMORPHONE HYDROCHLORIDE 0.5 MILLIGRAM(S): 2 INJECTION INTRAMUSCULAR; INTRAVENOUS; SUBCUTANEOUS at 09:58

## 2024-01-29 RX ADMIN — Medication 100 MILLIGRAM(S): at 05:19

## 2024-01-29 RX ADMIN — HEPARIN SODIUM 5000 UNIT(S): 5000 INJECTION INTRAVENOUS; SUBCUTANEOUS at 05:19

## 2024-01-29 RX ADMIN — ATORVASTATIN CALCIUM 40 MILLIGRAM(S): 80 TABLET, FILM COATED ORAL at 21:54

## 2024-01-29 RX ADMIN — ONDANSETRON 4 MILLIGRAM(S): 8 TABLET, FILM COATED ORAL at 13:22

## 2024-01-29 RX ADMIN — BUDESONIDE AND FORMOTEROL FUMARATE DIHYDRATE 2 PUFF(S): 160; 4.5 AEROSOL RESPIRATORY (INHALATION) at 21:54

## 2024-01-29 RX ADMIN — Medication 25 MILLIGRAM(S): at 21:54

## 2024-01-29 RX ADMIN — CARVEDILOL PHOSPHATE 25 MILLIGRAM(S): 80 CAPSULE, EXTENDED RELEASE ORAL at 21:54

## 2024-01-29 RX ADMIN — Medication 650 MILLIGRAM(S): at 17:17

## 2024-01-29 RX ADMIN — ERYTHROPOIETIN 4000 UNIT(S): 10000 INJECTION, SOLUTION INTRAVENOUS; SUBCUTANEOUS at 18:07

## 2024-01-29 RX ADMIN — RISPERIDONE 2 MILLIGRAM(S): 4 TABLET ORAL at 21:53

## 2024-01-29 RX ADMIN — POLYETHYLENE GLYCOL 3350 17 GRAM(S): 17 POWDER, FOR SOLUTION ORAL at 11:18

## 2024-01-29 RX ADMIN — Medication 25 MILLIGRAM(S): at 05:20

## 2024-01-29 RX ADMIN — AMLODIPINE BESYLATE 5 MILLIGRAM(S): 2.5 TABLET ORAL at 05:20

## 2024-01-29 RX ADMIN — CARVEDILOL PHOSPHATE 25 MILLIGRAM(S): 80 CAPSULE, EXTENDED RELEASE ORAL at 05:19

## 2024-01-29 RX ADMIN — HYDROMORPHONE HYDROCHLORIDE 0.5 MILLIGRAM(S): 2 INJECTION INTRAMUSCULAR; INTRAVENOUS; SUBCUTANEOUS at 03:58

## 2024-01-29 RX ADMIN — HYDROMORPHONE HYDROCHLORIDE 0.25 MILLIGRAM(S): 2 INJECTION INTRAMUSCULAR; INTRAVENOUS; SUBCUTANEOUS at 15:20

## 2024-01-29 RX ADMIN — CALCITRIOL 0.5 MICROGRAM(S): 0.5 CAPSULE ORAL at 11:18

## 2024-01-29 RX ADMIN — ONDANSETRON 4 MILLIGRAM(S): 8 TABLET, FILM COATED ORAL at 23:31

## 2024-01-29 RX ADMIN — MIRTAZAPINE 15 MILLIGRAM(S): 45 TABLET, ORALLY DISINTEGRATING ORAL at 11:17

## 2024-01-29 RX ADMIN — HYDROMORPHONE HYDROCHLORIDE 0.5 MILLIGRAM(S): 2 INJECTION INTRAMUSCULAR; INTRAVENOUS; SUBCUTANEOUS at 09:01

## 2024-01-29 RX ADMIN — HEPARIN SODIUM 5000 UNIT(S): 5000 INJECTION INTRAVENOUS; SUBCUTANEOUS at 13:44

## 2024-01-29 RX ADMIN — Medication 100 MILLIGRAM(S): at 05:59

## 2024-01-29 RX ADMIN — TIOTROPIUM BROMIDE 2 PUFF(S): 18 CAPSULE ORAL; RESPIRATORY (INHALATION) at 11:19

## 2024-01-29 RX ADMIN — Medication 200 MILLIGRAM(S): at 21:53

## 2024-01-29 RX ADMIN — SODIUM CHLORIDE 100 MILLILITER(S): 9 INJECTION INTRAMUSCULAR; INTRAVENOUS; SUBCUTANEOUS at 16:35

## 2024-01-29 RX ADMIN — Medication 650 MILLIGRAM(S): at 18:03

## 2024-01-29 RX ADMIN — SENNA PLUS 2 TABLET(S): 8.6 TABLET ORAL at 21:54

## 2024-01-29 RX ADMIN — Medication 3 MILLIGRAM(S): at 21:53

## 2024-01-29 RX ADMIN — BUDESONIDE AND FORMOTEROL FUMARATE DIHYDRATE 2 PUFF(S): 160; 4.5 AEROSOL RESPIRATORY (INHALATION) at 11:20

## 2024-01-29 NOTE — PROGRESS NOTE ADULT - PROBLEM SELECTOR PLAN 5
h/o COPD on 4L O2 at home  saturating 99% on 3L in ED  sat target 88-92%   will continue home albuterol, Spiriva, and symbicort h/o schizophrenia  takes trazodone, hydroxyzine, mirtazapine, risperidone, escitalopram and quetiapine at home  will resume home meds

## 2024-01-29 NOTE — PROGRESS NOTE ADULT - ASSESSMENT
43M PMH HTN, schizophrenia, COPD (4L home O2), and ESRD on HD (MWF) presenting to the ED with nausea, vomting and diarrhea admitted for acute pancreatitis. RUQ with findings of cholecystitis. Pt evaluated by surgery, plan for OR next week.

## 2024-01-29 NOTE — PROGRESS NOTE ADULT - PROBLEM SELECTOR PLAN 3
h/o HTN  takes coreg, hydralazine, lasix and amlodipine  will stop lasix as pt no longer makes urine with ESRD  will resume coreg, hydralazine and amlodipine with parameters  monitor BP h/o ESRD on HD MWF  follows Dr. La, will consult to resume HD  AV Fistula in RUE  monitor fluid status while pt is receiving fluids

## 2024-01-29 NOTE — PROGRESS NOTE ADULT - PROBLEM SELECTOR PLAN 4
h/o schizophrenia  takes trazodone, hydroxyzine, mirtazapine, risperidone, escitalopram and quetiapine at home  will resume home meds h/o HTN  takes coreg, hydralazine, lasix and amlodipine  will stop lasix as pt no longer makes urine with ESRD  will resume coreg, hydralazine and amlodipine with parameters  monitor BP

## 2024-01-29 NOTE — PROGRESS NOTE ADULT - SUBJECTIVE AND OBJECTIVE BOX
PGY-1 Progress Note discussed with attending    PAGER #: [[phone]] TILL 5:00 PM  PLEASE CONTACT ON CALL TEAM:  - On Call Team (Please refer to Janine) FROM 5:00 PM - 8:30PM  - Nightfloat Team FROM 8:30 -7:30 AM    CHIEF COMPLAINT & BRIEF HOSPITAL COURSE:      INTERVAL HPI/OVERNIGHT EVENTS:       MEDICATIONS  (STANDING):  amLODIPine   Tablet 5 milliGRAM(s) Oral daily  atorvastatin 40 milliGRAM(s) Oral at bedtime  budesonide 160 MICROgram(s)/formoterol 4.5 MICROgram(s) Inhaler 2 Puff(s) Inhalation two times a day  calcitriol   Capsule 0.5 MICROGram(s) Oral daily  carvedilol 25 milliGRAM(s) Oral every 12 hours  chlorhexidine 2% Cloths 1 Application(s) Topical daily  ciprofloxacin   IVPB 400 milliGRAM(s) IV Intermittent every 24 hours  epoetin cyndie (PROCRIT) Injectable 4000 Unit(s) IV Push <User Schedule>  escitalopram 10 milliGRAM(s) Oral daily  haloperidol     Tablet 2 milliGRAM(s) Oral daily  heparin   Injectable 5000 Unit(s) SubCutaneous every 8 hours  hydrALAZINE 100 milliGRAM(s) Oral three times a day  hydrOXYzine hydrochloride 25 milliGRAM(s) Oral every 12 hours  metroNIDAZOLE  IVPB 500 milliGRAM(s) IV Intermittent every 8 hours  mirtazapine 15 milliGRAM(s) Oral daily  polyethylene glycol 3350 17 Gram(s) Oral daily  QUEtiapine  milliGRAM(s) Oral daily  risperiDONE   Tablet 2 milliGRAM(s) Oral two times a day  senna 2 Tablet(s) Oral at bedtime  sevelamer carbonate 800 milliGRAM(s) Oral three times a day with meals  sodium chloride 0.9%. 1000 milliLiter(s) (100 mL/Hr) IV Continuous <Continuous>  tiotropium 2.5 MICROgram(s) Inhaler 2 Puff(s) Inhalation daily  traZODone 100 milliGRAM(s) Oral daily    MEDICATIONS  (PRN):  acetaminophen     Tablet .. 650 milliGRAM(s) Oral every 6 hours PRN Temp greater or equal to 38C (100.4F), Mild Pain (1 - 3)  albuterol    90 MICROgram(s) HFA Inhaler 2 Puff(s) Inhalation every 6 hours PRN Shortness of Breath and/or Wheezing  aluminum hydroxide/magnesium hydroxide/simethicone Suspension 30 milliLiter(s) Oral every 4 hours PRN Dyspepsia  HYDROmorphone  Injectable 0.25 milliGRAM(s) IV Push every 6 hours PRN Moderate Pain (4 - 6)  HYDROmorphone  Injectable 0.5 milliGRAM(s) IV Push every 6 hours PRN Severe Pain (7 - 10)  melatonin 3 milliGRAM(s) Oral at bedtime PRN Insomnia  ondansetron Injectable 4 milliGRAM(s) IV Push every 8 hours PRN Nausea and/or Vomiting      REVIEW OF SYSTEMS:  CONSTITUTIONAL: No fever, weight loss, or fatigue  RESPIRATORY: No shortness of breath  CARDIOVASCULAR: No chest pain  GASTROINTESTINAL: No abdominal pain.  GENITOURINARY: No dysuria  NEUROLOGICAL: No headaches  SKIN: No itching, burning, rashes    Vital Signs Last 24 Hrs  T(C): 36.4 (29 Jan 2024 05:33), Max: 36.8 (28 Jan 2024 14:17)  T(F): 97.5 (29 Jan 2024 05:33), Max: 98.2 (28 Jan 2024 14:17)  HR: 72 (29 Jan 2024 05:33) (69 - 72)  BP: 163/72 (29 Jan 2024 05:33) (140/63 - 179/80)  BP(mean): --  RR: 18 (29 Jan 2024 05:33) (18 - 18)  SpO2: 98% (29 Jan 2024 05:33) (98% - 98%)    Parameters below as of 29 Jan 2024 05:33  Patient On (Oxygen Delivery Method): nasal cannula  O2 Flow (L/min): 3      PHYSICAL EXAMINATION:  GENERAL: NAD, well built  HEAD:  Atraumatic, Normocephalic  EYES:  conjunctiva and sclera clear  CHEST/LUNG: Clear to auscultation. No rales, rhonchi, wheezing, or rubs  HEART: Regular rate and rhythm; No murmurs, rubs, or gallops  ABDOMEN: Soft, Nontender, Nondistended; Bowel sounds present  NERVOUS SYSTEM:  Alert & Oriented X3,    EXTREMITIES:  2+ Peripheral Pulses, No clubbing, cyanosis, or edema  SKIN: warm dry                          9.0    5.28  )-----------( 159      ( 29 Jan 2024 06:33 )             27.7     01-29    128<L>  |  94<L>  |  55<H>  ----------------------------<  112<H>  5.0   |  26  |  10.10<H>    Ca    9.9      29 Jan 2024 06:33  Phos  6.7     01-29  Mg     2.4     01-29    TPro  8.0  /  Alb  3.3<L>  /  TBili  0.5  /  DBili  x   /  AST  29  /  ALT  62<H>  /  AlkPhos  85  01-29    LIVER FUNCTIONS - ( 29 Jan 2024 06:33 )  Alb: 3.3 g/dL / Pro: 8.0 g/dL / ALK PHOS: 85 U/L / ALT: 62 U/L DA / AST: 29 U/L / GGT: x                   CAPILLARY BLOOD GLUCOSE      RADIOLOGY & ADDITIONAL TESTS:                   PGY-1 Progress Note discussed with attending    PAGER #: [[phone]] TILL 5:00 PM  PLEASE CONTACT ON CALL TEAM:  - On Call Team (Please refer to Janine) FROM 5:00 PM - 8:30PM  - Nightfloat Team FROM 8:30 -7:30 AM    CHIEF COMPLAINT & BRIEF HOSPITAL COURSE: acute pancreatitis       INTERVAL HPI/OVERNIGHT EVENTS: Pt is NPO and is no longer vomiting, he is constipated and complains of epigastric and RUQ tenderness and pain.      MEDICATIONS  (STANDING):  amLODIPine   Tablet 5 milliGRAM(s) Oral daily  atorvastatin 40 milliGRAM(s) Oral at bedtime  budesonide 160 MICROgram(s)/formoterol 4.5 MICROgram(s) Inhaler 2 Puff(s) Inhalation two times a day  calcitriol   Capsule 0.5 MICROGram(s) Oral daily  carvedilol 25 milliGRAM(s) Oral every 12 hours  chlorhexidine 2% Cloths 1 Application(s) Topical daily  ciprofloxacin   IVPB 400 milliGRAM(s) IV Intermittent every 24 hours  epoetin cyndie (PROCRIT) Injectable 4000 Unit(s) IV Push <User Schedule>  escitalopram 10 milliGRAM(s) Oral daily  haloperidol     Tablet 2 milliGRAM(s) Oral daily  heparin   Injectable 5000 Unit(s) SubCutaneous every 8 hours  hydrALAZINE 100 milliGRAM(s) Oral three times a day  hydrOXYzine hydrochloride 25 milliGRAM(s) Oral every 12 hours  metroNIDAZOLE  IVPB 500 milliGRAM(s) IV Intermittent every 8 hours  mirtazapine 15 milliGRAM(s) Oral daily  polyethylene glycol 3350 17 Gram(s) Oral daily  QUEtiapine  milliGRAM(s) Oral daily  risperiDONE   Tablet 2 milliGRAM(s) Oral two times a day  senna 2 Tablet(s) Oral at bedtime  sevelamer carbonate 800 milliGRAM(s) Oral three times a day with meals  sodium chloride 0.9%. 1000 milliLiter(s) (100 mL/Hr) IV Continuous <Continuous>  tiotropium 2.5 MICROgram(s) Inhaler 2 Puff(s) Inhalation daily  traZODone 100 milliGRAM(s) Oral daily    MEDICATIONS  (PRN):  acetaminophen     Tablet .. 650 milliGRAM(s) Oral every 6 hours PRN Temp greater or equal to 38C (100.4F), Mild Pain (1 - 3)  albuterol    90 MICROgram(s) HFA Inhaler 2 Puff(s) Inhalation every 6 hours PRN Shortness of Breath and/or Wheezing  aluminum hydroxide/magnesium hydroxide/simethicone Suspension 30 milliLiter(s) Oral every 4 hours PRN Dyspepsia  HYDROmorphone  Injectable 0.25 milliGRAM(s) IV Push every 6 hours PRN Moderate Pain (4 - 6)  HYDROmorphone  Injectable 0.5 milliGRAM(s) IV Push every 6 hours PRN Severe Pain (7 - 10)  melatonin 3 milliGRAM(s) Oral at bedtime PRN Insomnia  ondansetron Injectable 4 milliGRAM(s) IV Push every 8 hours PRN Nausea and/or Vomiting      REVIEW OF SYSTEMS:  CONSTITUTIONAL: No fever, weight loss, or fatigue  RESPIRATORY: No shortness of breath  CARDIOVASCULAR: No chest pain  GASTROINTESTINAL: + Epigastric and RUQ abdominal pain.  GENITOURINARY: No dysuria  NEUROLOGICAL: No headaches  SKIN: No itching, burning, rashes    Vital Signs Last 24 Hrs  T(C): 36.4 (29 Jan 2024 05:33), Max: 36.8 (28 Jan 2024 14:17)  T(F): 97.5 (29 Jan 2024 05:33), Max: 98.2 (28 Jan 2024 14:17)  HR: 72 (29 Jan 2024 05:33) (69 - 72)  BP: 163/72 (29 Jan 2024 05:33) (140/63 - 179/80)  BP(mean): --  RR: 18 (29 Jan 2024 05:33) (18 - 18)  SpO2: 98% (29 Jan 2024 05:33) (98% - 98%)    Parameters below as of 29 Jan 2024 05:33  Patient On (Oxygen Delivery Method): nasal cannula  O2 Flow (L/min): 3      PHYSICAL EXAMINATION:  GENERAL: NAD, well built  HEAD:  Atraumatic, Normocephalic  EYES:  conjunctiva and sclera clear  CHEST/LUNG: Clear to auscultation. No rales, rhonchi, wheezing, or rubs  HEART: Regular rate and rhythm; No murmurs, rubs, or gallops  ABDOMEN: Soft, Nontender, Nondistended; Bowel sounds present  NERVOUS SYSTEM:  Alert & Oriented X3,    EXTREMITIES:  2+ Peripheral Pulses, No clubbing, cyanosis, or edema  SKIN: warm dry                          9.0    5.28  )-----------( 159      ( 29 Jan 2024 06:33 )             27.7     01-29    128<L>  |  94<L>  |  55<H>  ----------------------------<  112<H>  5.0   |  26  |  10.10<H>    Ca    9.9      29 Jan 2024 06:33  Phos  6.7     01-29  Mg     2.4     01-29    TPro  8.0  /  Alb  3.3<L>  /  TBili  0.5  /  DBili  x   /  AST  29  /  ALT  62<H>  /  AlkPhos  85  01-29    LIVER FUNCTIONS - ( 29 Jan 2024 06:33 )  Alb: 3.3 g/dL / Pro: 8.0 g/dL / ALK PHOS: 85 U/L / ALT: 62 U/L DA / AST: 29 U/L / GGT: x                   CAPILLARY BLOOD GLUCOSE      RADIOLOGY & ADDITIONAL TESTS:                   PGY-1 Progress Note discussed with attending    PAGER #: [[phone]] TILL 5:00 PM  PLEASE CONTACT ON CALL TEAM:  - On Call Team (Please refer to Janine) FROM 5:00 PM - 8:30PM  - Nightfloat Team FROM 8:30 -7:30 AM    CHIEF COMPLAINT & BRIEF HOSPITAL COURSE: acute pancreatitis       INTERVAL HPI/OVERNIGHT EVENTS: Pt is NPO b/c scheduled for MRCP and is no longer vomiting, he is constipated and complains of epigastric and RUQ tenderness and pain.      MEDICATIONS  (STANDING):  amLODIPine   Tablet 5 milliGRAM(s) Oral daily  atorvastatin 40 milliGRAM(s) Oral at bedtime  budesonide 160 MICROgram(s)/formoterol 4.5 MICROgram(s) Inhaler 2 Puff(s) Inhalation two times a day  calcitriol   Capsule 0.5 MICROGram(s) Oral daily  carvedilol 25 milliGRAM(s) Oral every 12 hours  chlorhexidine 2% Cloths 1 Application(s) Topical daily  ciprofloxacin   IVPB 400 milliGRAM(s) IV Intermittent every 24 hours  epoetin cyndie (PROCRIT) Injectable 4000 Unit(s) IV Push <User Schedule>  escitalopram 10 milliGRAM(s) Oral daily  haloperidol     Tablet 2 milliGRAM(s) Oral daily  heparin   Injectable 5000 Unit(s) SubCutaneous every 8 hours  hydrALAZINE 100 milliGRAM(s) Oral three times a day  hydrOXYzine hydrochloride 25 milliGRAM(s) Oral every 12 hours  metroNIDAZOLE  IVPB 500 milliGRAM(s) IV Intermittent every 8 hours  mirtazapine 15 milliGRAM(s) Oral daily  polyethylene glycol 3350 17 Gram(s) Oral daily  QUEtiapine  milliGRAM(s) Oral daily  risperiDONE   Tablet 2 milliGRAM(s) Oral two times a day  senna 2 Tablet(s) Oral at bedtime  sevelamer carbonate 800 milliGRAM(s) Oral three times a day with meals  sodium chloride 0.9%. 1000 milliLiter(s) (100 mL/Hr) IV Continuous <Continuous>  tiotropium 2.5 MICROgram(s) Inhaler 2 Puff(s) Inhalation daily  traZODone 100 milliGRAM(s) Oral daily    MEDICATIONS  (PRN):  acetaminophen     Tablet .. 650 milliGRAM(s) Oral every 6 hours PRN Temp greater or equal to 38C (100.4F), Mild Pain (1 - 3)  albuterol    90 MICROgram(s) HFA Inhaler 2 Puff(s) Inhalation every 6 hours PRN Shortness of Breath and/or Wheezing  aluminum hydroxide/magnesium hydroxide/simethicone Suspension 30 milliLiter(s) Oral every 4 hours PRN Dyspepsia  HYDROmorphone  Injectable 0.25 milliGRAM(s) IV Push every 6 hours PRN Moderate Pain (4 - 6)  HYDROmorphone  Injectable 0.5 milliGRAM(s) IV Push every 6 hours PRN Severe Pain (7 - 10)  melatonin 3 milliGRAM(s) Oral at bedtime PRN Insomnia  ondansetron Injectable 4 milliGRAM(s) IV Push every 8 hours PRN Nausea and/or Vomiting      REVIEW OF SYSTEMS:  CONSTITUTIONAL: No fever, weight loss, or fatigue  RESPIRATORY: No shortness of breath  CARDIOVASCULAR: No chest pain  GASTROINTESTINAL: + Epigastric and RUQ abdominal pain.  GENITOURINARY: No dysuria  NEUROLOGICAL: No headaches  SKIN: No itching, burning, rashes    Vital Signs Last 24 Hrs  T(C): 36.4 (29 Jan 2024 05:33), Max: 36.8 (28 Jan 2024 14:17)  T(F): 97.5 (29 Jan 2024 05:33), Max: 98.2 (28 Jan 2024 14:17)  HR: 72 (29 Jan 2024 05:33) (69 - 72)  BP: 163/72 (29 Jan 2024 05:33) (140/63 - 179/80)  BP(mean): --  RR: 18 (29 Jan 2024 05:33) (18 - 18)  SpO2: 98% (29 Jan 2024 05:33) (98% - 98%)    Parameters below as of 29 Jan 2024 05:33  Patient On (Oxygen Delivery Method): nasal cannula  O2 Flow (L/min): 3      PHYSICAL EXAMINATION:  GENERAL: NAD, well built  HEAD:  Atraumatic, Normocephalic  EYES:  conjunctiva and sclera clear  CHEST/LUNG: Clear to auscultation. No rales, rhonchi, wheezing, or rubs  HEART: Regular rate and rhythm; No murmurs, rubs, or gallops  ABDOMEN: Soft, Nontender, Nondistended; Bowel sounds present  NERVOUS SYSTEM:  Alert & Oriented X3,    EXTREMITIES:  2+ Peripheral Pulses, No clubbing, cyanosis, or edema  SKIN: warm dry                          9.0    5.28  )-----------( 159      ( 29 Jan 2024 06:33 )             27.7     01-29    128<L>  |  94<L>  |  55<H>  ----------------------------<  112<H>  5.0   |  26  |  10.10<H>    Ca    9.9      29 Jan 2024 06:33  Phos  6.7     01-29  Mg     2.4     01-29    TPro  8.0  /  Alb  3.3<L>  /  TBili  0.5  /  DBili  x   /  AST  29  /  ALT  62<H>  /  AlkPhos  85  01-29    LIVER FUNCTIONS - ( 29 Jan 2024 06:33 )  Alb: 3.3 g/dL / Pro: 8.0 g/dL / ALK PHOS: 85 U/L / ALT: 62 U/L DA / AST: 29 U/L / GGT: x                   CAPILLARY BLOOD GLUCOSE      RADIOLOGY & ADDITIONAL TESTS:                   PGY-1 Progress Note discussed with attending    PAGER #: [[phone]] TILL 5:00 PM  PLEASE CONTACT ON CALL TEAM:  - On Call Team (Please refer to Janine) FROM 5:00 PM - 8:30PM  - Nightfloat Team FROM 8:30 -7:30 AM    CHIEF COMPLAINT & BRIEF HOSPITAL COURSE:   acute pancreatitis, cholecystitis      INTERVAL HPI/OVERNIGHT EVENTS:   Pt was made NPO due to nausea and vomiting last night. Patient examined at bedside this AM. He is constipated and complains of epigastric and RUQ tenderness and pain.    Pt is optimized for lap cholecystectomy tomorrow: Barber Score- 0.6%        MEDICATIONS  (STANDING):  amLODIPine   Tablet 5 milliGRAM(s) Oral daily  atorvastatin 40 milliGRAM(s) Oral at bedtime  budesonide 160 MICROgram(s)/formoterol 4.5 MICROgram(s) Inhaler 2 Puff(s) Inhalation two times a day  calcitriol   Capsule 0.5 MICROGram(s) Oral daily  carvedilol 25 milliGRAM(s) Oral every 12 hours  chlorhexidine 2% Cloths 1 Application(s) Topical daily  ciprofloxacin   IVPB 400 milliGRAM(s) IV Intermittent every 24 hours  epoetin cyndie (PROCRIT) Injectable 4000 Unit(s) IV Push <User Schedule>  escitalopram 10 milliGRAM(s) Oral daily  haloperidol     Tablet 2 milliGRAM(s) Oral daily  heparin   Injectable 5000 Unit(s) SubCutaneous every 8 hours  hydrALAZINE 100 milliGRAM(s) Oral three times a day  hydrOXYzine hydrochloride 25 milliGRAM(s) Oral every 12 hours  metroNIDAZOLE  IVPB 500 milliGRAM(s) IV Intermittent every 8 hours  mirtazapine 15 milliGRAM(s) Oral daily  polyethylene glycol 3350 17 Gram(s) Oral daily  QUEtiapine  milliGRAM(s) Oral daily  risperiDONE   Tablet 2 milliGRAM(s) Oral two times a day  senna 2 Tablet(s) Oral at bedtime  sevelamer carbonate 800 milliGRAM(s) Oral three times a day with meals  sodium chloride 0.9%. 1000 milliLiter(s) (100 mL/Hr) IV Continuous <Continuous>  tiotropium 2.5 MICROgram(s) Inhaler 2 Puff(s) Inhalation daily  traZODone 100 milliGRAM(s) Oral daily    MEDICATIONS  (PRN):  acetaminophen     Tablet .. 650 milliGRAM(s) Oral every 6 hours PRN Temp greater or equal to 38C (100.4F), Mild Pain (1 - 3)  albuterol    90 MICROgram(s) HFA Inhaler 2 Puff(s) Inhalation every 6 hours PRN Shortness of Breath and/or Wheezing  aluminum hydroxide/magnesium hydroxide/simethicone Suspension 30 milliLiter(s) Oral every 4 hours PRN Dyspepsia  HYDROmorphone  Injectable 0.25 milliGRAM(s) IV Push every 6 hours PRN Moderate Pain (4 - 6)  HYDROmorphone  Injectable 0.5 milliGRAM(s) IV Push every 6 hours PRN Severe Pain (7 - 10)  melatonin 3 milliGRAM(s) Oral at bedtime PRN Insomnia  ondansetron Injectable 4 milliGRAM(s) IV Push every 8 hours PRN Nausea and/or Vomiting      REVIEW OF SYSTEMS:  CONSTITUTIONAL: No fever, weight loss, or fatigue  RESPIRATORY: No shortness of breath  CARDIOVASCULAR: No chest pain  GASTROINTESTINAL: + Epigastric and RUQ abdominal pain.  GENITOURINARY: No dysuria  NEUROLOGICAL: No headaches  SKIN: No itching, burning, rashes    Vital Signs Last 24 Hrs  T(C): 36.4 (29 Jan 2024 05:33), Max: 36.8 (28 Jan 2024 14:17)  T(F): 97.5 (29 Jan 2024 05:33), Max: 98.2 (28 Jan 2024 14:17)  HR: 72 (29 Jan 2024 05:33) (69 - 72)  BP: 163/72 (29 Jan 2024 05:33) (140/63 - 179/80)  BP(mean): --  RR: 18 (29 Jan 2024 05:33) (18 - 18)  SpO2: 98% (29 Jan 2024 05:33) (98% - 98%)    Parameters below as of 29 Jan 2024 05:33  Patient On (Oxygen Delivery Method): nasal cannula  O2 Flow (L/min): 3      PHYSICAL EXAMINATION:  GENERAL: mild distress, obese  HEAD:  Atraumatic, Normocephalic  EYES:  conjunctiva and sclera clear  CHEST/LUNG: Clear to auscultation. No rales, rhonchi, wheezing, or rubs  HEART: Regular rate and rhythm; No murmurs, rubs, or gallops  ABDOMEN: +Tender to palpation in epigastric area and RUQ. Soft, Nondistended; Bowel sounds present  NERVOUS SYSTEM:  Alert & Oriented X3,    EXTREMITIES:  2+ Peripheral Pulses, No clubbing, cyanosis, or edema  SKIN: warm dry                          9.0    5.28  )-----------( 159      ( 29 Jan 2024 06:33 )             27.7     01-29    128<L>  |  94<L>  |  55<H>  ----------------------------<  112<H>  5.0   |  26  |  10.10<H>    Ca    9.9      29 Jan 2024 06:33  Phos  6.7     01-29  Mg     2.4     01-29    TPro  8.0  /  Alb  3.3<L>  /  TBili  0.5  /  DBili  x   /  AST  29  /  ALT  62<H>  /  AlkPhos  85  01-29    LIVER FUNCTIONS - ( 29 Jan 2024 06:33 )  Alb: 3.3 g/dL / Pro: 8.0 g/dL / ALK PHOS: 85 U/L / ALT: 62 U/L DA / AST: 29 U/L / GGT: x                   CAPILLARY BLOOD GLUCOSE      RADIOLOGY & ADDITIONAL TESTS:

## 2024-01-29 NOTE — PROGRESS NOTE ADULT - ASSESSMENT
43y.o. Male with pancreatitis, questionable cholelithiasis    -f/u MRCP  -Robotic assisted lap javier if MRCP+  -May increase zofran given large body habitus  -Clears as tolerated  -NPO after midnight except PO meds  -IVF when NPO  -DVT ppx  -Chlorhexidine wipes   -OOB ambulate  -Pain control prn  -HD today  -Medical optimization and clearance    This note and its recommendations herein are preliminary until such time as cosigned by an attending. 43y.o. Male with pancreatitis, questionable cholelithiasis    -f/u MRCP  -Robotic assisted lap javier if MRCP+  -Type & cross for 2U PRBC hold for OR  -May increase zofran given large body habitus  -Clears as tolerated  -NPO after midnight except PO meds  -IVF when NPO  -DVT ppx  -Chlorhexidine wipes   -OOB ambulate  -Pain control prn  -HD today  -Medical optimization and clearance    This note and its recommendations herein are preliminary until such time as cosigned by an attending.

## 2024-01-29 NOTE — PROGRESS NOTE ADULT - SUBJECTIVE AND OBJECTIVE BOX
INTERVAL HPI/OVERNIGHT EVENTS:  Pt resting comfortably. c/o nausea without vomiting.  NPO.  MRCP performed.  Scheduled for HD today.    MEDICATIONS  (STANDING):  amLODIPine   Tablet 5 milliGRAM(s) Oral daily  atorvastatin 40 milliGRAM(s) Oral at bedtime  budesonide 160 MICROgram(s)/formoterol 4.5 MICROgram(s) Inhaler 2 Puff(s) Inhalation two times a day  calcitriol   Capsule 0.5 MICROGram(s) Oral daily  carvedilol 25 milliGRAM(s) Oral every 12 hours  chlorhexidine 2% Cloths 1 Application(s) Topical daily  ciprofloxacin   IVPB 400 milliGRAM(s) IV Intermittent every 24 hours  epoetin cyndie (PROCRIT) Injectable 4000 Unit(s) IV Push <User Schedule>  escitalopram 10 milliGRAM(s) Oral daily  haloperidol     Tablet 2 milliGRAM(s) Oral daily  heparin   Injectable 5000 Unit(s) SubCutaneous every 8 hours  hydrALAZINE 100 milliGRAM(s) Oral three times a day  hydrOXYzine hydrochloride 25 milliGRAM(s) Oral every 12 hours  metroNIDAZOLE  IVPB 500 milliGRAM(s) IV Intermittent every 8 hours  mirtazapine 15 milliGRAM(s) Oral daily  QUEtiapine  milliGRAM(s) Oral daily  risperiDONE   Tablet 2 milliGRAM(s) Oral two times a day  sevelamer carbonate 800 milliGRAM(s) Oral three times a day with meals  sodium chloride 0.9%. 1000 milliLiter(s) (100 mL/Hr) IV Continuous <Continuous>  tiotropium 2.5 MICROgram(s) Inhaler 2 Puff(s) Inhalation daily  traZODone 100 milliGRAM(s) Oral daily    MEDICATIONS  (PRN):  acetaminophen     Tablet .. 650 milliGRAM(s) Oral every 6 hours PRN Temp greater or equal to 38C (100.4F), Mild Pain (1 - 3)  albuterol    90 MICROgram(s) HFA Inhaler 2 Puff(s) Inhalation every 6 hours PRN Shortness of Breath and/or Wheezing  aluminum hydroxide/magnesium hydroxide/simethicone Suspension 30 milliLiter(s) Oral every 4 hours PRN Dyspepsia  HYDROmorphone  Injectable 0.5 milliGRAM(s) IV Push every 6 hours PRN Severe Pain (7 - 10)  HYDROmorphone  Injectable 0.25 milliGRAM(s) IV Push every 6 hours PRN Moderate Pain (4 - 6)  melatonin 3 milliGRAM(s) Oral at bedtime PRN Insomnia  ondansetron Injectable 4 milliGRAM(s) IV Push every 8 hours PRN Nausea and/or Vomiting    Vital Signs Last 24 Hrs  T(C): 36.4 (29 Jan 2024 05:33), Max: 36.8 (28 Jan 2024 14:17)  T(F): 97.5 (29 Jan 2024 05:33), Max: 98.2 (28 Jan 2024 14:17)  HR: 72 (29 Jan 2024 05:33) (69 - 72)  BP: 163/72 (29 Jan 2024 05:33) (140/63 - 179/80)  BP(mean): --  RR: 18 (29 Jan 2024 05:33) (18 - 18)  SpO2: 98% (29 Jan 2024 05:33) (98% - 98%)    Parameters below as of 29 Jan 2024 05:33  Patient On (Oxygen Delivery Method): nasal cannula  O2 Flow (L/min): 3    Physical:  General: A&Ox3. NAD. Obese  Abdomen: Soft nondistended, epigastric & RUQ tenderness.    LABS:                        9.0    5.28  )-----------( 159      ( 29 Jan 2024 06:33 )             27.7             01-29    128<L>  |  94<L>  |  55<H>  ----------------------------<  112<H>  5.0   |  26  |  10.10<H>    Ca    9.9      29 Jan 2024 06:33  Phos  6.7     01-29  Mg     2.4     01-29    TPro  8.0  /  Alb  3.3<L>  /  TBili  0.5  /  DBili  x   /  AST  29  /  ALT  62<H>  /  AlkPhos  85  01-29

## 2024-01-29 NOTE — PROGRESS NOTE ADULT - ASSESSMENT
# ESRD.  HD MWF. scheduled today.  # acute pancreatitis. on fluids. ? lap javier per surgery  # anemia of CKD epogen on hemodialysis  # RENAL OSTEODYSTROPHY. PHOS IS HIGH.  RENVELA WHEN ON A DIET. IN LIGHT OFF SENSIPAR.  # HTN. blood pressure high in light of pain. pain control  s/p incr hydralazine to 100mg

## 2024-01-29 NOTE — PROGRESS NOTE ADULT - PROBLEM SELECTOR PLAN 6
heparin h/o COPD on 4L O2 at home  saturating 99% on 3L in ED  sat target 88-92%   will continue home albuterol, Spiriva, and symbicort

## 2024-01-29 NOTE — PROGRESS NOTE ADULT - PROBLEM SELECTOR PLAN 1
No gallstones seen on RUQ US  p/w with nausea, vomiting, and diarrhea x 1 week  no recent travel, no foods or sick contacts  umbilical, epigastric and RUQ abdominal regions TTP on admission  lipase 304    CT AP: -Mild nodular contour of the liver may represent cirrhosis. Splenomegaly and mild varices, suggestive of   associated portal hypertension.   -Mild stranding adjacent to the pancreatic head and body, compatible with acute pancreatitis. No evidence for pancreatic necrosis.  -Pericholecystic stranding is again noted.   US RUQ: Cholelithiasis with diffuse gallbladder wall thickening. Possible acute cholecystitis  CLD, advance diet as tolerated  pain control, tylenol for mild, 0,25 dilaudid q6 for moderate and 0.5 dilaudid q6 for severe pain  cont IV fluids for 24 hours at 100cc/hr  -Edith javier Tuesday 1/30 No gallstones seen on RUQ US  p/w with nausea, vomiting, and diarrhea x 1 week  no recent travel, no foods or sick contacts  umbilical, epigastric and RUQ abdominal regions TTP on admission  lipase 304    CT AP: -Mild nodular contour of the liver may represent cirrhosis. Splenomegaly and mild varices, suggestive of   associated portal hypertension.   -Mild stranding adjacent to the pancreatic head and body, compatible with acute pancreatitis. No evidence for pancreatic necrosis.  -Pericholecystic stranding is again noted.

## 2024-01-29 NOTE — PROGRESS NOTE ADULT - PROBLEM SELECTOR PLAN 2
h/o ESRD on HD MWF  last HD session on Monday (only completed 3 hours due to GI symptoms) and missed his HD on Wednesday  follows Dr. La, will consult to resume HD  AV Fistula in RUE  monitor fluid status while pt is receiving fluids US RUQ: Possible acute cholecystitis  Advance diet as tolerated  pain control, tylenol for mild, 0,25 dilaudid q6 for moderate and 0.5 dilaudid q6 for severe pain  cont IV fluids for 24 hours at 100cc/hr  -Edith javier Tuesday 1/30  NPO after midnight

## 2024-01-30 ENCOUNTER — TRANSCRIPTION ENCOUNTER (OUTPATIENT)
Age: 44
End: 2024-01-30

## 2024-01-30 ENCOUNTER — RESULT REVIEW (OUTPATIENT)
Age: 44
End: 2024-01-30

## 2024-01-30 LAB
ALBUMIN SERPL ELPH-MCNC: 3.2 G/DL — LOW (ref 3.5–5)
ALP SERPL-CCNC: 83 U/L — SIGNIFICANT CHANGE UP (ref 40–120)
ALT FLD-CCNC: 58 U/L DA — SIGNIFICANT CHANGE UP (ref 10–60)
ANION GAP SERPL CALC-SCNC: 8 MMOL/L — SIGNIFICANT CHANGE UP (ref 5–17)
APTT BLD: 33.4 SEC — SIGNIFICANT CHANGE UP (ref 24.5–35.6)
AST SERPL-CCNC: 29 U/L — SIGNIFICANT CHANGE UP (ref 10–40)
BASOPHILS # BLD AUTO: 0.01 K/UL — SIGNIFICANT CHANGE UP (ref 0–0.2)
BASOPHILS NFR BLD AUTO: 0.2 % — SIGNIFICANT CHANGE UP (ref 0–2)
BILIRUB SERPL-MCNC: 0.5 MG/DL — SIGNIFICANT CHANGE UP (ref 0.2–1.2)
BLD GP AB SCN SERPL QL: SIGNIFICANT CHANGE UP
BUN SERPL-MCNC: 37 MG/DL — HIGH (ref 7–18)
CALCIUM SERPL-MCNC: 9.6 MG/DL — SIGNIFICANT CHANGE UP (ref 8.4–10.5)
CHLORIDE SERPL-SCNC: 93 MMOL/L — LOW (ref 96–108)
CO2 SERPL-SCNC: 29 MMOL/L — SIGNIFICANT CHANGE UP (ref 22–31)
CREAT SERPL-MCNC: 7.82 MG/DL — HIGH (ref 0.5–1.3)
EGFR: 8 ML/MIN/1.73M2 — LOW
EOSINOPHIL # BLD AUTO: 0.06 K/UL — SIGNIFICANT CHANGE UP (ref 0–0.5)
EOSINOPHIL NFR BLD AUTO: 1.2 % — SIGNIFICANT CHANGE UP (ref 0–6)
GLUCOSE BLDC GLUCOMTR-MCNC: 100 MG/DL — HIGH (ref 70–99)
GLUCOSE BLDC GLUCOMTR-MCNC: 103 MG/DL — HIGH (ref 70–99)
GLUCOSE BLDC GLUCOMTR-MCNC: 116 MG/DL — HIGH (ref 70–99)
GLUCOSE BLDC GLUCOMTR-MCNC: 129 MG/DL — HIGH (ref 70–99)
GLUCOSE BLDC GLUCOMTR-MCNC: 95 MG/DL — SIGNIFICANT CHANGE UP (ref 70–99)
GLUCOSE SERPL-MCNC: 97 MG/DL — SIGNIFICANT CHANGE UP (ref 70–99)
HCT VFR BLD CALC: 27.2 % — LOW (ref 39–50)
HGB BLD-MCNC: 8.6 G/DL — LOW (ref 13–17)
IMM GRANULOCYTES NFR BLD AUTO: 0.6 % — SIGNIFICANT CHANGE UP (ref 0–0.9)
INR BLD: 1.11 RATIO — SIGNIFICANT CHANGE UP (ref 0.85–1.18)
LYMPHOCYTES # BLD AUTO: 0.38 K/UL — LOW (ref 1–3.3)
LYMPHOCYTES # BLD AUTO: 7.5 % — LOW (ref 13–44)
MAGNESIUM SERPL-MCNC: 2.1 MG/DL — SIGNIFICANT CHANGE UP (ref 1.6–2.6)
MCHC RBC-ENTMCNC: 31.6 GM/DL — LOW (ref 32–36)
MCHC RBC-ENTMCNC: 32.8 PG — SIGNIFICANT CHANGE UP (ref 27–34)
MCV RBC AUTO: 103.8 FL — HIGH (ref 80–100)
MONOCYTES # BLD AUTO: 0.56 K/UL — SIGNIFICANT CHANGE UP (ref 0–0.9)
MONOCYTES NFR BLD AUTO: 11 % — SIGNIFICANT CHANGE UP (ref 2–14)
MRSA PCR RESULT.: SIGNIFICANT CHANGE UP
NEUTROPHILS # BLD AUTO: 4.04 K/UL — SIGNIFICANT CHANGE UP (ref 1.8–7.4)
NEUTROPHILS NFR BLD AUTO: 79.5 % — HIGH (ref 43–77)
NRBC # BLD: 0 /100 WBCS — SIGNIFICANT CHANGE UP (ref 0–0)
PHOSPHATE SERPL-MCNC: 5.8 MG/DL — HIGH (ref 2.5–4.5)
PLATELET # BLD AUTO: 142 K/UL — LOW (ref 150–400)
POTASSIUM SERPL-MCNC: 4.3 MMOL/L — SIGNIFICANT CHANGE UP (ref 3.5–5.3)
POTASSIUM SERPL-SCNC: 4.3 MMOL/L — SIGNIFICANT CHANGE UP (ref 3.5–5.3)
PROT SERPL-MCNC: 7.8 G/DL — SIGNIFICANT CHANGE UP (ref 6–8.3)
PROTHROM AB SERPL-ACNC: 12.6 SEC — SIGNIFICANT CHANGE UP (ref 9.5–13)
RBC # BLD: 2.62 M/UL — LOW (ref 4.2–5.8)
RBC # FLD: 13.7 % — SIGNIFICANT CHANGE UP (ref 10.3–14.5)
S AUREUS DNA NOSE QL NAA+PROBE: DETECTED
SODIUM SERPL-SCNC: 130 MMOL/L — LOW (ref 135–145)
WBC # BLD: 5.08 K/UL — SIGNIFICANT CHANGE UP (ref 3.8–10.5)
WBC # FLD AUTO: 5.08 K/UL — SIGNIFICANT CHANGE UP (ref 3.8–10.5)

## 2024-01-30 PROCEDURE — 88304 TISSUE EXAM BY PATHOLOGIST: CPT | Mod: 26

## 2024-01-30 PROCEDURE — 99233 SBSQ HOSP IP/OBS HIGH 50: CPT | Mod: GC

## 2024-01-30 PROCEDURE — 47563 LAPARO CHOLECYSTECTOMY/GRAPH: CPT

## 2024-01-30 PROCEDURE — 47563 LAPARO CHOLECYSTECTOMY/GRAPH: CPT | Mod: AS

## 2024-01-30 DEVICE — LIGATING CLIPS WECK HEMOLOK POLYMER LARGE (PURPLE) 6: Type: IMPLANTABLE DEVICE | Status: FUNCTIONAL

## 2024-01-30 DEVICE — IMPLANTABLE DEVICE: Type: IMPLANTABLE DEVICE | Status: FUNCTIONAL

## 2024-01-30 RX ORDER — FENTANYL CITRATE 50 UG/ML
25 INJECTION INTRAVENOUS
Refills: 0 | Status: DISCONTINUED | OUTPATIENT
Start: 2024-01-30 | End: 2024-01-30

## 2024-01-30 RX ORDER — SUGAMMADEX 100 MG/ML
400 INJECTION, SOLUTION INTRAVENOUS ONCE
Refills: 0 | Status: DISCONTINUED | OUTPATIENT
Start: 2024-01-30 | End: 2024-01-30

## 2024-01-30 RX ORDER — SODIUM CHLORIDE 9 MG/ML
1000 INJECTION, SOLUTION INTRAVENOUS
Refills: 0 | Status: DISCONTINUED | OUTPATIENT
Start: 2024-01-30 | End: 2024-01-30

## 2024-01-30 RX ORDER — ONDANSETRON 8 MG/1
4 TABLET, FILM COATED ORAL ONCE
Refills: 0 | Status: DISCONTINUED | OUTPATIENT
Start: 2024-01-30 | End: 2024-01-30

## 2024-01-30 RX ORDER — ERYTHROPOIETIN 10000 [IU]/ML
10000 INJECTION, SOLUTION INTRAVENOUS; SUBCUTANEOUS
Refills: 0 | Status: DISCONTINUED | OUTPATIENT
Start: 2024-01-30 | End: 2024-01-30

## 2024-01-30 RX ADMIN — QUETIAPINE FUMARATE 300 MILLIGRAM(S): 200 TABLET, FILM COATED ORAL at 11:35

## 2024-01-30 RX ADMIN — AMLODIPINE BESYLATE 5 MILLIGRAM(S): 2.5 TABLET ORAL at 05:27

## 2024-01-30 RX ADMIN — HYDROMORPHONE HYDROCHLORIDE 0.5 MILLIGRAM(S): 2 INJECTION INTRAMUSCULAR; INTRAVENOUS; SUBCUTANEOUS at 05:28

## 2024-01-30 RX ADMIN — ESCITALOPRAM OXALATE 10 MILLIGRAM(S): 10 TABLET, FILM COATED ORAL at 11:34

## 2024-01-30 RX ADMIN — Medication 100 MILLIGRAM(S): at 05:27

## 2024-01-30 RX ADMIN — SENNA PLUS 2 TABLET(S): 8.6 TABLET ORAL at 23:07

## 2024-01-30 RX ADMIN — HYDROMORPHONE HYDROCHLORIDE 0.5 MILLIGRAM(S): 2 INJECTION INTRAMUSCULAR; INTRAVENOUS; SUBCUTANEOUS at 12:00

## 2024-01-30 RX ADMIN — CHLORHEXIDINE GLUCONATE 1 APPLICATION(S): 213 SOLUTION TOPICAL at 11:37

## 2024-01-30 RX ADMIN — Medication 25 MILLIGRAM(S): at 05:27

## 2024-01-30 RX ADMIN — HYDROMORPHONE HYDROCHLORIDE 0.25 MILLIGRAM(S): 2 INJECTION INTRAMUSCULAR; INTRAVENOUS; SUBCUTANEOUS at 09:06

## 2024-01-30 RX ADMIN — SODIUM CHLORIDE 100 MILLILITER(S): 9 INJECTION INTRAMUSCULAR; INTRAVENOUS; SUBCUTANEOUS at 11:38

## 2024-01-30 RX ADMIN — ATORVASTATIN CALCIUM 40 MILLIGRAM(S): 80 TABLET, FILM COATED ORAL at 23:06

## 2024-01-30 RX ADMIN — HALOPERIDOL DECANOATE 2 MILLIGRAM(S): 100 INJECTION INTRAMUSCULAR at 11:34

## 2024-01-30 RX ADMIN — Medication 100 MILLIGRAM(S): at 21:36

## 2024-01-30 RX ADMIN — Medication 100 MILLIGRAM(S): at 23:06

## 2024-01-30 RX ADMIN — Medication 100 MILLIGRAM(S): at 11:34

## 2024-01-30 RX ADMIN — Medication 200 MILLIGRAM(S): at 20:05

## 2024-01-30 RX ADMIN — CALCITRIOL 0.5 MICROGRAM(S): 0.5 CAPSULE ORAL at 11:34

## 2024-01-30 RX ADMIN — RISPERIDONE 2 MILLIGRAM(S): 4 TABLET ORAL at 05:27

## 2024-01-30 RX ADMIN — HYDROMORPHONE HYDROCHLORIDE 0.5 MILLIGRAM(S): 2 INJECTION INTRAMUSCULAR; INTRAVENOUS; SUBCUTANEOUS at 23:27

## 2024-01-30 RX ADMIN — HYDROMORPHONE HYDROCHLORIDE 0.5 MILLIGRAM(S): 2 INJECTION INTRAMUSCULAR; INTRAVENOUS; SUBCUTANEOUS at 23:07

## 2024-01-30 RX ADMIN — CARVEDILOL PHOSPHATE 25 MILLIGRAM(S): 80 CAPSULE, EXTENDED RELEASE ORAL at 05:27

## 2024-01-30 RX ADMIN — Medication 100 MILLIGRAM(S): at 13:50

## 2024-01-30 RX ADMIN — MIRTAZAPINE 15 MILLIGRAM(S): 45 TABLET, ORALLY DISINTEGRATING ORAL at 11:35

## 2024-01-30 RX ADMIN — HYDROMORPHONE HYDROCHLORIDE 0.5 MILLIGRAM(S): 2 INJECTION INTRAMUSCULAR; INTRAVENOUS; SUBCUTANEOUS at 11:34

## 2024-01-30 RX ADMIN — TIOTROPIUM BROMIDE 2 PUFF(S): 18 CAPSULE ORAL; RESPIRATORY (INHALATION) at 11:38

## 2024-01-30 RX ADMIN — BUDESONIDE AND FORMOTEROL FUMARATE DIHYDRATE 2 PUFF(S): 160; 4.5 AEROSOL RESPIRATORY (INHALATION) at 09:08

## 2024-01-30 RX ADMIN — HYDROMORPHONE HYDROCHLORIDE 0.25 MILLIGRAM(S): 2 INJECTION INTRAMUSCULAR; INTRAVENOUS; SUBCUTANEOUS at 10:00

## 2024-01-30 NOTE — PROGRESS NOTE ADULT - PROBLEM SELECTOR PLAN 3
h/o ESRD on HD MWF  follows Dr. La, will consult to resume HD  AV Fistula in RUE  monitor fluid status while pt is receiving fluids h/o ESRD on HD MWF  follows Dr. La, will consult to resume HD  AV Fistula in RUE with palpable thrill  monitor fluid status while pt is receiving fluids

## 2024-01-30 NOTE — PHARMACOTHERAPY INTERVENTION NOTE - COMMENTS
Patient currently on ciprofloxacin + metronidazole for acute cholecystitis coverage. Given ongoing ciprofloxacin IV shortage and greater incidence of adverse reactions with ciprofloxacin, suggest to switch to ceftriaxone 2g IVPB every 24 hours.

## 2024-01-30 NOTE — PROGRESS NOTE ADULT - PROBLEM SELECTOR PLAN 1
No gallstones seen on RUQ US  p/w with nausea, vomiting, and diarrhea x 1 week  no recent travel, no foods or sick contacts  umbilical, epigastric and RUQ abdominal regions TTP on admission  lipase 304    CT AP: -Mild nodular contour of the liver may represent cirrhosis. Splenomegaly and mild varices, suggestive of   associated portal hypertension.   -Mild stranding adjacent to the pancreatic head and body, compatible with acute pancreatitis. No evidence for pancreatic necrosis.  -Pericholecystic stranding is again noted.

## 2024-01-30 NOTE — PROGRESS NOTE ADULT - ATTENDING COMMENTS
43M PMH HTN, schizophrenia, COPD (4L home O2), and ESRD on HD (MWF) presenting to the ED with nausea, vomting and diarrhea admitted for acute pancreatitis. RUQ with findings of cholecystitis. Pt evaluated by surgery, plan for OR next week.    Patient seen and examined at bedside. No events overnight. patient states he feels somewhat better, wants to try to eat. Still has abd pain, especially at night because he sleeps on the side, and on palpation.     Labs reviewed CBC, CMP    Imaging reviewed: RUQ sono    PE as above.     Assessment:  #pancreatitis  #Cholecystitis  #hyponatremia  #ESRD  #HTn  #HLD  #COPD  #schizophrenia    Plan:  - Continue cipro and flagyl  - Advance diet as tolerated  - IV fluids, carefully due to hx of ESRD  - f/u MRCP   - HD on MWF as schedule, Dr. La consulted  - continue BP meds, monitor BP  - Pain medication as needed.   - O2 oxygen as needed, continue home medications (inhalers0  - Surgery consulted, plan for cholecystectomy next week.  - heparin sq
43M PMH HTN, schizophrenia, COPD (4L home O2), and ESRD on HD (MWF) presenting to the ED with nausea, vomiting and epigastric abdominal pain, found to have elevated lipase and radiographic evidence of caute pancreatitis. Admitted for acute pancreatitis- suspected to be 2/2 gallstones     Patient was seen and examined, his mother was present at bedside. Patient's diet was advanced to clears yesterday but he had multiple episodes of vomiting and is not tolerating. He still has abd pain. He lost his IV access and per patient has been on off IVF since 1 am last night. Staff attempted to place IV a few times but was not able to do so.     Labs reviewed- cbc, bmp, lfts     PE as above   epigastric and RUQ abd tenderness   no crackles on exam    CT reviewed personally- GB wall inflammation along w/ pancreatic inflammation     A/P:  #Acute pancreatitis   #Suspected acute cholecystitis   #ESRD on HD  #COPD on 4 L home O2   #Chronic hypoxic respiratory failure   #Uncontrolled HTN  #DVT ppx     Plan:  -Patient w/ acute pancreatitis- initially felt to 2/2 gallstones. Though per updated US report, no clear evidence of cholelithiasis. Patient denies current ETOH use, TG wnl. No other offending drugs on board.   -No clear cholelithiasis on imaging but given RUQ  tenderness and GB wall thickening on US, would treat as acute javier. Start IV abx- Rocephin and flagyl. D/w surgery attending Dr. Bueno, known to his service from out-patient, recommended to get MRCP as other imaging modalities may be limited given his body habitus. MRCP non con ordered. Plan for lap chol on Tuesday   -Will change to NPO, poct q 6 hrs. c/w pain meds and IVF- needs judicious use of IVF given his ESRD status. D/w nephrologist Dr. La, agreed w/ IVF @ 100cc/ hr.   -HD per schedule   -BP uncontrolled, c/w home meds. Will need to re-asses BP after HD  -Heparin SC
Patient seen/evaluated at bedside with the resident team on 1/30/24. I agree with the resident progress note/outlined plan of care. My independent findings and conclusions are documented.    OVernight had episode of "green" emesis. Denies chest pain, orthopnea/dyspnea at this time. Discussed plan of care today- plan for OR surgical intervention at 3pm. Mother at bedside with update/plan of care provided    PE: significant for AAOx3 NAD, lying comfortably flat  no wheezes/rhonchi  soft, obese, + tenderness diffusely- increased at RUQ and epigastrium  RUE AVF with + thrill/bruit  no LE edema/cyanosis/clubbing    labs reviewed  cbc, bmp, lfts    EKG 1/25/25- NSR at 61 BPM, LAFB, 1st degree AVB, no TITO/STD, q waves      43M PMH HTN, schizophrenia, COPD (4L home O2), and ESRD on HD ([facility]) presenting to the ED with nausea, vomiting and diarrhea admitted for acute pancreatitis. RUQ with findings of cholecystitis. Pt evaluated by surgery, plan for OR tentatively tomorrow      Assessment:  #pancreatitis  #Cholecystitis  #preoperative evaluation  #hyponatremia  #ESRD  #chronic hypoxic respiratory failure  #HTn  #HLD  #COPD  #schizophrenia  # macrocytic anemia  #morbid obesity    Plan:  -METs < 4 due to limited ability for aerobic exercise secondary to osteoarthritis of the left knee- though no overt signs of heart failure. 02 requirement is at his baseline. RCRI CLass II, 1 point, 6%, Barber - 0.6%- no recent cardiac evaluation/TTE- last studies in 2022. Pt is at intermediate risk for intermediate procedure  - Continue cipro and flagyl  -c/w IVF hydration- NS at 100 cc/hr  - NPO for now, except meds, sips of water, fingersticks q 6  - hyponatremia multifactorial in setting of psych meds, pain, ESRD with missed dialysis sessions, vomiting and dehydration  - send urine lytes, serum lytes/osm  - IV fluids, carefully due to hx of ESRD- planned for HD tomorrow 1/31/24  - f/u MRCP unclear if any good image was obtained, had to be aborted due to anxiety/claustrophobia mid way study  - HD on MW as schedule, Dr. La consulted, input appreciated- planned for HD today- follow up sodium post hemodialysis  -c/w hydralazine 100mg tid, can increase norvasc if BP remains uncontrolled (or alternatively, switch to nifidipine in an ESRD patient)  - continue BP meds, monitor BP  - Pain medication as needed.   - O2 oxygen as needed, continue home medications  - Surgery consulted, plan for cholecystectomy next week.  - continue psych home meds  - off DVT ppx in setting of planned surgical intervention .
Patient seen/evaluated at bedside on 1/29/24. I agree with the resident progress note/outlined plan of care. My independent findings and conclusions are documented.    S: reports abdominal/epigastric pain ongoing, + nausea. Last emesis yesterday- has remained NPO since yesterday.  Endorses limited exercise tolerance due to left knee pain. No orthopnea/chest pain, PND.  Chronically on 4 L home 02  reportedly secondary to copd  MRCP poorly tolerated due to claustrophobia though some imaging obtained    PE: significant for AAOx3 NAD, lying comfortably flat  no wheezes/rhonchi  soft, obese, + tenderness diffusely- increased at RUQ and epigastrium  RUE AVF with + thrill/bruit  no LE edema/cyanosis/clubbing    labs reviewed  cbc, bmp, lfts    EKG 1/25/25- NSR at 61 BPM, LAFB, 1st degree AVB, no TITO/STD, q waves      43M PMH HTN, schizophrenia, COPD (4L home O2), and ESRD on HD ([facility]) presenting to the ED with nausea, vomiting and diarrhea admitted for acute pancreatitis. RUQ with findings of cholecystitis. Pt evaluated by surgery, plan for OR tentatively tomorrow      Assessment:  #pancreatitis  #Cholecystitis  #preoperative evaluation  #hyponatremia  #ESRD  #chronic hypoxic respiratory failure  #HTn  #HLD  #COPD  #schizophrenia  # macrocytic anemia  #morbid obesity    Plan:  -METs < 4 due to limited ability for aerobic exercise secondary to osteoarthritis of the left knee- though no overt signs of heart failure. 02 requirement is at his baseline. RCRI CLass II, 1 point, 6%, Barber - 0.6%- no recent cardiac evaluation/TTE- last studies in 2022. Pt is at intermediate risk for intermediate procedure  - Continue cipro and flagyl  -c/w IVF hydration- NS at 100 cc/hr  - NPO for now, except meds, sips of water, fingersticks q 6  - hyponatremia multifactorial in setting of psych meds, pain, ESRD with missed dialysis sessions, vomiting and dehydration  - send urine lytes, serum lytes/osm  - IV fluids, carefully due to hx of ESRD  - f/u MRCP unclear if any good image was obtained, had to be aborted due to anxiety/claustrophobia mid way study  - HD on MWF as schedule, Dr. La consulted, input appreciated- planned for HD today- follow up sodium post hemodialysis  -c/w hydralazine 100mg tid, can increase norvasc if BP remains uncontrolled (or alternatively, switch to nifidipine in an ESRD patient)  - continue BP meds, monitor BP  - Pain medication as needed.   - O2 oxygen as needed, continue home medications  - Surgery consulted, plan for cholecystectomy next week.  - continue psych home meds  - off DVT ppx in setting of planned surgical intervention

## 2024-01-30 NOTE — PROGRESS NOTE ADULT - PROBLEM SELECTOR PLAN 2
US RUQ: Possible acute cholecystitis  Advance diet as tolerated  pain control, tylenol for mild, 0,25 dilaudid q6 for moderate and 0.5 dilaudid q6 for severe pain  cont IV fluids for 24 hours at 100cc/hr  -Edith javier Tuesday 1/30  NPO after midnight US RUQ: Possible acute cholecystitis  keep NPO while vomiting. Advance diet as tolerated  pain control, tylenol for mild, 0,25 dilaudid q6 for moderate and 0.5 dilaudid q6 for severe pain  cont IV fluids for 24 hours at 100cc/hr  -Lap javier Tuesday 1/30  NPO after midnight

## 2024-01-30 NOTE — PHARMACOTHERAPY INTERVENTION NOTE - COMMENTS
Bed: ED27  Expected date:   Expected time:   Means of arrival:   Comments:  [address]; 65M fever   Penicillin allergy noted as swelling/anaphylaxis in electronic medical record.    Updated allergy to note that patient had tolerated ceftriaxone during 10/2020 admission.

## 2024-01-30 NOTE — PROGRESS NOTE ADULT - SUBJECTIVE AND OBJECTIVE BOX
PGY-1 Progress Note discussed with attending    PAGER #: [[phone]] TILL 5:00 PM  PLEASE CONTACT ON CALL TEAM:  - On Call Team (Please refer to Janine) FROM 5:00 PM - 8:30PM  - Nightfloat Team FROM 8:30 -7:30 AM    CHIEF COMPLAINT & BRIEF HOSPITAL COURSE:      INTERVAL HPI/OVERNIGHT EVENTS:       MEDICATIONS  (STANDING):  amLODIPine   Tablet 5 milliGRAM(s) Oral daily  atorvastatin 40 milliGRAM(s) Oral at bedtime  budesonide 160 MICROgram(s)/formoterol 4.5 MICROgram(s) Inhaler 2 Puff(s) Inhalation two times a day  calcitriol   Capsule 0.5 MICROGram(s) Oral daily  carvedilol 25 milliGRAM(s) Oral every 12 hours  chlorhexidine 2% Cloths 1 Application(s) Topical daily  ciprofloxacin   IVPB 400 milliGRAM(s) IV Intermittent every 24 hours  epoetin cyndie (PROCRIT) Injectable 4000 Unit(s) IV Push <User Schedule>  escitalopram 10 milliGRAM(s) Oral daily  haloperidol     Tablet 2 milliGRAM(s) Oral daily  hydrALAZINE 100 milliGRAM(s) Oral three times a day  hydrOXYzine hydrochloride 25 milliGRAM(s) Oral every 12 hours  metroNIDAZOLE  IVPB 500 milliGRAM(s) IV Intermittent every 8 hours  mirtazapine 15 milliGRAM(s) Oral daily  polyethylene glycol 3350 17 Gram(s) Oral daily  QUEtiapine  milliGRAM(s) Oral daily  risperiDONE   Tablet 2 milliGRAM(s) Oral two times a day  senna 2 Tablet(s) Oral at bedtime  sevelamer carbonate 800 milliGRAM(s) Oral three times a day with meals  sodium chloride 0.9%. 1000 milliLiter(s) (100 mL/Hr) IV Continuous <Continuous>  tiotropium 2.5 MICROgram(s) Inhaler 2 Puff(s) Inhalation daily  traZODone 100 milliGRAM(s) Oral daily    MEDICATIONS  (PRN):  acetaminophen     Tablet .. 650 milliGRAM(s) Oral every 6 hours PRN Temp greater or equal to 38C (100.4F), Mild Pain (1 - 3)  albuterol    90 MICROgram(s) HFA Inhaler 2 Puff(s) Inhalation every 6 hours PRN Shortness of Breath and/or Wheezing  aluminum hydroxide/magnesium hydroxide/simethicone Suspension 30 milliLiter(s) Oral every 4 hours PRN Dyspepsia  HYDROmorphone  Injectable 0.25 milliGRAM(s) IV Push every 6 hours PRN Moderate Pain (4 - 6)  HYDROmorphone  Injectable 0.5 milliGRAM(s) IV Push every 6 hours PRN Severe Pain (7 - 10)  melatonin 3 milliGRAM(s) Oral at bedtime PRN Insomnia  ondansetron Injectable 4 milliGRAM(s) IV Push every 8 hours PRN Nausea and/or Vomiting      REVIEW OF SYSTEMS:  CONSTITUTIONAL: No fever, weight loss, or fatigue  RESPIRATORY: No shortness of breath  CARDIOVASCULAR: No chest pain  GASTROINTESTINAL: No abdominal pain.  GENITOURINARY: No dysuria  NEUROLOGICAL: No headaches  SKIN: No itching, burning, rashes    Vital Signs Last 24 Hrs  T(C): 36.3 (30 Jan 2024 05:37), Max: 36.9 (29 Jan 2024 12:26)  T(F): 97.4 (30 Jan 2024 05:37), Max: 98.5 (29 Jan 2024 12:26)  HR: 67 (30 Jan 2024 05:37) (67 - 81)  BP: 147/75 (30 Jan 2024 05:37) (136/71 - 168/83)  BP(mean): --  RR: 16 (30 Jan 2024 05:37) (16 - 18)  SpO2: 100% (30 Jan 2024 05:37) (99% - 100%)    Parameters below as of 30 Jan 2024 05:37  Patient On (Oxygen Delivery Method): nasal cannula  O2 Flow (L/min): 4      PHYSICAL EXAMINATION:  GENERAL: NAD, well built  HEAD:  Atraumatic, Normocephalic  EYES:  conjunctiva and sclera clear  CHEST/LUNG: Clear to auscultation. No rales, rhonchi, wheezing, or rubs  HEART: Regular rate and rhythm; No murmurs, rubs, or gallops  ABDOMEN: Soft, Nontender, Nondistended; Bowel sounds present  NERVOUS SYSTEM:  Alert & Oriented X3,    EXTREMITIES:  2+ Peripheral Pulses, No clubbing, cyanosis, or edema  SKIN: warm dry                          8.6    5.08  )-----------( 142      ( 30 Jan 2024 06:30 )             27.2     01-30    130<L>  |  93<L>  |  37<H>  ----------------------------<  97  4.3   |  29  |  7.82<H>    Ca    9.6      30 Jan 2024 06:30  Phos  5.8     01-30  Mg     2.1     01-30    TPro  7.8  /  Alb  3.2<L>  /  TBili  0.5  /  DBili  x   /  AST  29  /  ALT  58  /  AlkPhos  83  01-30    LIVER FUNCTIONS - ( 30 Jan 2024 06:30 )  Alb: 3.2 g/dL / Pro: 7.8 g/dL / ALK PHOS: 83 U/L / ALT: 58 U/L DA / AST: 29 U/L / GGT: x               PT/INR - ( 30 Jan 2024 06:30 )   PT: 12.6 sec;   INR: 1.11 ratio         PTT - ( 30 Jan 2024 06:30 )  PTT:33.4 sec    CAPILLARY BLOOD GLUCOSE      RADIOLOGY & ADDITIONAL TESTS:                   PGY-1 Progress Note discussed with attending    PAGER #: [[phone]] TILL 5:00 PM  PLEASE CONTACT ON CALL TEAM:  - On Call Team (Please refer to Janine) FROM 5:00 PM - 8:30PM  - Nightfloat Team FROM 8:30 -7:30 AM    CHIEF COMPLAINT & BRIEF HOSPITAL COURSE: Pt is complaining of severe RUQ pain rated 10 on the pain scale. He had one episode of NBNB vomiting that was green in color yesterday evening. He reports no vomiting this morning. He hasn't had a bowel movement yet (4 days total). He is currently NPO awaiting lap javier.       INTERVAL HPI/OVERNIGHT EVENTS:       MEDICATIONS  (STANDING):  amLODIPine   Tablet 5 milliGRAM(s) Oral daily  atorvastatin 40 milliGRAM(s) Oral at bedtime  budesonide 160 MICROgram(s)/formoterol 4.5 MICROgram(s) Inhaler 2 Puff(s) Inhalation two times a day  calcitriol   Capsule 0.5 MICROGram(s) Oral daily  carvedilol 25 milliGRAM(s) Oral every 12 hours  chlorhexidine 2% Cloths 1 Application(s) Topical daily  ciprofloxacin   IVPB 400 milliGRAM(s) IV Intermittent every 24 hours  epoetin cyndie (PROCRIT) Injectable 4000 Unit(s) IV Push <User Schedule>  escitalopram 10 milliGRAM(s) Oral daily  haloperidol     Tablet 2 milliGRAM(s) Oral daily  hydrALAZINE 100 milliGRAM(s) Oral three times a day  hydrOXYzine hydrochloride 25 milliGRAM(s) Oral every 12 hours  metroNIDAZOLE  IVPB 500 milliGRAM(s) IV Intermittent every 8 hours  mirtazapine 15 milliGRAM(s) Oral daily  polyethylene glycol 3350 17 Gram(s) Oral daily  QUEtiapine  milliGRAM(s) Oral daily  risperiDONE   Tablet 2 milliGRAM(s) Oral two times a day  senna 2 Tablet(s) Oral at bedtime  sevelamer carbonate 800 milliGRAM(s) Oral three times a day with meals  sodium chloride 0.9%. 1000 milliLiter(s) (100 mL/Hr) IV Continuous <Continuous>  tiotropium 2.5 MICROgram(s) Inhaler 2 Puff(s) Inhalation daily  traZODone 100 milliGRAM(s) Oral daily    MEDICATIONS  (PRN):  acetaminophen     Tablet .. 650 milliGRAM(s) Oral every 6 hours PRN Temp greater or equal to 38C (100.4F), Mild Pain (1 - 3)  albuterol    90 MICROgram(s) HFA Inhaler 2 Puff(s) Inhalation every 6 hours PRN Shortness of Breath and/or Wheezing  aluminum hydroxide/magnesium hydroxide/simethicone Suspension 30 milliLiter(s) Oral every 4 hours PRN Dyspepsia  HYDROmorphone  Injectable 0.25 milliGRAM(s) IV Push every 6 hours PRN Moderate Pain (4 - 6)  HYDROmorphone  Injectable 0.5 milliGRAM(s) IV Push every 6 hours PRN Severe Pain (7 - 10)  melatonin 3 milliGRAM(s) Oral at bedtime PRN Insomnia  ondansetron Injectable 4 milliGRAM(s) IV Push every 8 hours PRN Nausea and/or Vomiting      REVIEW OF SYSTEMS:  CONSTITUTIONAL: No fever, weight loss, or fatigue  RESPIRATORY: No shortness of breath  CARDIOVASCULAR: No chest pain  GASTROINTESTINAL: + abdominal pain, + vomiting, + constipation  GENITOURINARY: No dysuria  NEUROLOGICAL: No headaches  SKIN: No itching, burning, rashes    Vital Signs Last 24 Hrs  T(C): 36.3 (30 Jan 2024 05:37), Max: 36.9 (29 Jan 2024 12:26)  T(F): 97.4 (30 Jan 2024 05:37), Max: 98.5 (29 Jan 2024 12:26)  HR: 67 (30 Jan 2024 05:37) (67 - 81)  BP: 147/75 (30 Jan 2024 05:37) (136/71 - 168/83)  BP(mean): --  RR: 16 (30 Jan 2024 05:37) (16 - 18)  SpO2: 100% (30 Jan 2024 05:37) (99% - 100%)    Parameters below as of 30 Jan 2024 05:37  Patient On (Oxygen Delivery Method): nasal cannula  O2 Flow (L/min): 4      PHYSICAL EXAMINATION:  GENERAL: NAD, well built  HEAD:  Atraumatic, Normocephalic  EYES:  conjunctiva and sclera clear  CHEST/LUNG: Clear to auscultation. No rales, rhonchi, wheezing, or rubs  HEART: Regular rate and rhythm; No murmurs, rubs, or gallops  ABDOMEN: RUQ pain and tenderness, Soft, nontender, Nondistended; Bowel sounds present  NERVOUS SYSTEM:  Alert & Oriented X3,    EXTREMITIES:  2+ Peripheral Pulses, No clubbing, cyanosis, or edema  SKIN: warm dry                          8.6    5.08  )-----------( 142      ( 30 Jan 2024 06:30 )             27.2     01-30    130<L>  |  93<L>  |  37<H>  ----------------------------<  97  4.3   |  29  |  7.82<H>    Ca    9.6      30 Jan 2024 06:30  Phos  5.8     01-30  Mg     2.1     01-30    TPro  7.8  /  Alb  3.2<L>  /  TBili  0.5  /  DBili  x   /  AST  29  /  ALT  58  /  AlkPhos  83  01-30    LIVER FUNCTIONS - ( 30 Jan 2024 06:30 )  Alb: 3.2 g/dL / Pro: 7.8 g/dL / ALK PHOS: 83 U/L / ALT: 58 U/L DA / AST: 29 U/L / GGT: x               PT/INR - ( 30 Jan 2024 06:30 )   PT: 12.6 sec;   INR: 1.11 ratio         PTT - ( 30 Jan 2024 06:30 )  PTT:33.4 sec    CAPILLARY BLOOD GLUCOSE      RADIOLOGY & ADDITIONAL TESTS:                   PGY-1 Progress Note discussed with attending    PAGER #: [[phone]] TILL 5:00 PM  PLEASE CONTACT ON CALL TEAM:  - On Call Team (Please refer to Janine) FROM 5:00 PM - 8:30PM  - Nightfloat Team FROM 8:30 -7:30 AM    CHIEF COMPLAINT & BRIEF HOSPITAL COURSE:   Cholecystitis, pancreatitis, ESRD      INTERVAL HPI/OVERNIGHT EVENTS:   Pt had one episode of NBNB vomiting that was green in color yesterday evening. Pt is complaining of severe RUQ pain rated 10 on the pain scale. He reports no vomiting this morning. He hasn't had a bowel movement in 6 days. He is currently NPO awaiting lap javier.    MEDICATIONS  (STANDING):  amLODIPine   Tablet 5 milliGRAM(s) Oral daily  atorvastatin 40 milliGRAM(s) Oral at bedtime  budesonide 160 MICROgram(s)/formoterol 4.5 MICROgram(s) Inhaler 2 Puff(s) Inhalation two times a day  calcitriol   Capsule 0.5 MICROGram(s) Oral daily  carvedilol 25 milliGRAM(s) Oral every 12 hours  chlorhexidine 2% Cloths 1 Application(s) Topical daily  ciprofloxacin   IVPB 400 milliGRAM(s) IV Intermittent every 24 hours  epoetin cyndie (PROCRIT) Injectable 4000 Unit(s) IV Push <User Schedule>  escitalopram 10 milliGRAM(s) Oral daily  haloperidol     Tablet 2 milliGRAM(s) Oral daily  hydrALAZINE 100 milliGRAM(s) Oral three times a day  hydrOXYzine hydrochloride 25 milliGRAM(s) Oral every 12 hours  metroNIDAZOLE  IVPB 500 milliGRAM(s) IV Intermittent every 8 hours  mirtazapine 15 milliGRAM(s) Oral daily  polyethylene glycol 3350 17 Gram(s) Oral daily  QUEtiapine  milliGRAM(s) Oral daily  risperiDONE   Tablet 2 milliGRAM(s) Oral two times a day  senna 2 Tablet(s) Oral at bedtime  sevelamer carbonate 800 milliGRAM(s) Oral three times a day with meals  sodium chloride 0.9%. 1000 milliLiter(s) (100 mL/Hr) IV Continuous <Continuous>  tiotropium 2.5 MICROgram(s) Inhaler 2 Puff(s) Inhalation daily  traZODone 100 milliGRAM(s) Oral daily    MEDICATIONS  (PRN):  acetaminophen     Tablet .. 650 milliGRAM(s) Oral every 6 hours PRN Temp greater or equal to 38C (100.4F), Mild Pain (1 - 3)  albuterol    90 MICROgram(s) HFA Inhaler 2 Puff(s) Inhalation every 6 hours PRN Shortness of Breath and/or Wheezing  aluminum hydroxide/magnesium hydroxide/simethicone Suspension 30 milliLiter(s) Oral every 4 hours PRN Dyspepsia  HYDROmorphone  Injectable 0.25 milliGRAM(s) IV Push every 6 hours PRN Moderate Pain (4 - 6)  HYDROmorphone  Injectable 0.5 milliGRAM(s) IV Push every 6 hours PRN Severe Pain (7 - 10)  melatonin 3 milliGRAM(s) Oral at bedtime PRN Insomnia  ondansetron Injectable 4 milliGRAM(s) IV Push every 8 hours PRN Nausea and/or Vomiting      REVIEW OF SYSTEMS:  CONSTITUTIONAL: No fever, weight loss, or fatigue  RESPIRATORY: No shortness of breath  CARDIOVASCULAR: No chest pain  GASTROINTESTINAL: + abdominal pain, + constipation  GENITOURINARY: No dysuria  NEUROLOGICAL: No headaches  SKIN: No itching, burning, rashes    Vital Signs Last 24 Hrs  T(C): 36.3 (30 Jan 2024 05:37), Max: 36.9 (29 Jan 2024 12:26)  T(F): 97.4 (30 Jan 2024 05:37), Max: 98.5 (29 Jan 2024 12:26)  HR: 67 (30 Jan 2024 05:37) (67 - 81)  BP: 147/75 (30 Jan 2024 05:37) (136/71 - 168/83)  BP(mean): --  RR: 16 (30 Jan 2024 05:37) (16 - 18)  SpO2: 100% (30 Jan 2024 05:37) (99% - 100%)    Parameters below as of 30 Jan 2024 05:37  Patient On (Oxygen Delivery Method): nasal cannula  O2 Flow (L/min): 4      PHYSICAL EXAMINATION:  GENERAL: Mild distress, obese  HEAD:  Atraumatic, Normocephalic  EYES:  conjunctiva and sclera clear  CHEST/LUNG: Clear to auscultation. No rales, rhonchi, wheezing, or rubs  HEART: Regular rate and rhythm; Murmur heard near left sternal border  ABDOMEN: RUQ tenderness, Soft, Nondistended; Hypoactive Bowel sounds present  NERVOUS SYSTEM:  Alert & Oriented X3,  EXTREMITIES:  2+ Peripheral Pulses, No clubbing, cyanosis, or edema  SKIN: warm dry                          8.6    5.08  )-----------( 142      ( 30 Jan 2024 06:30 )             27.2     01-30    130<L>  |  93<L>  |  37<H>  ----------------------------<  97  4.3   |  29  |  7.82<H>    Ca    9.6      30 Jan 2024 06:30  Phos  5.8     01-30  Mg     2.1     01-30    TPro  7.8  /  Alb  3.2<L>  /  TBili  0.5  /  DBili  x   /  AST  29  /  ALT  58  /  AlkPhos  83  01-30    LIVER FUNCTIONS - ( 30 Jan 2024 06:30 )  Alb: 3.2 g/dL / Pro: 7.8 g/dL / ALK PHOS: 83 U/L / ALT: 58 U/L DA / AST: 29 U/L / GGT: x               PT/INR - ( 30 Jan 2024 06:30 )   PT: 12.6 sec;   INR: 1.11 ratio         PTT - ( 30 Jan 2024 06:30 )  PTT:33.4 sec    CAPILLARY BLOOD GLUCOSE      RADIOLOGY & ADDITIONAL TESTS:

## 2024-01-30 NOTE — PROGRESS NOTE ADULT - PROBLEM SELECTOR PLAN 6
h/o COPD on 4L O2 at home  saturating 99% on 3L in ED  sat target 88-92%   will continue home albuterol, Spiriva, and symbicort h/o COPD on 4L O2 at home  saturating 99% on 3L in ED  sat target 88-92%   continue home albuterol, Spiriva, and symbicort

## 2024-01-30 NOTE — PROGRESS NOTE ADULT - PROBLEM SELECTOR PLAN 5
h/o schizophrenia  takes trazodone, hydroxyzine, mirtazapine, risperidone, escitalopram and quetiapine at home  will resume home meds h/o schizophrenia  takes trazodone, hydroxyzine, mirtazapine, risperidone, escitalopram and quetiapine at home  cont home meds

## 2024-01-31 DIAGNOSIS — E66.01 MORBID (SEVERE) OBESITY DUE TO EXCESS CALORIES: ICD-10-CM

## 2024-01-31 DIAGNOSIS — G89.18 OTHER ACUTE POSTPROCEDURAL PAIN: ICD-10-CM

## 2024-01-31 DIAGNOSIS — E87.1 HYPO-OSMOLALITY AND HYPONATREMIA: ICD-10-CM

## 2024-01-31 DIAGNOSIS — Z90.49 ACQUIRED ABSENCE OF OTHER SPECIFIED PARTS OF DIGESTIVE TRACT: ICD-10-CM

## 2024-01-31 DIAGNOSIS — Z02.9 ENCOUNTER FOR ADMINISTRATIVE EXAMINATIONS, UNSPECIFIED: ICD-10-CM

## 2024-01-31 LAB
ALBUMIN SERPL ELPH-MCNC: 3.3 G/DL — LOW (ref 3.5–5)
ALP SERPL-CCNC: 89 U/L — SIGNIFICANT CHANGE UP (ref 40–120)
ALT FLD-CCNC: 62 U/L DA — HIGH (ref 10–60)
ANION GAP SERPL CALC-SCNC: 8 MMOL/L — SIGNIFICANT CHANGE UP (ref 5–17)
AST SERPL-CCNC: 41 U/L — HIGH (ref 10–40)
BASOPHILS # BLD AUTO: 0.01 K/UL — SIGNIFICANT CHANGE UP (ref 0–0.2)
BASOPHILS NFR BLD AUTO: 0.2 % — SIGNIFICANT CHANGE UP (ref 0–2)
BILIRUB SERPL-MCNC: 0.6 MG/DL — SIGNIFICANT CHANGE UP (ref 0.2–1.2)
BUN SERPL-MCNC: 46 MG/DL — HIGH (ref 7–18)
CALCIUM SERPL-MCNC: 9.9 MG/DL — SIGNIFICANT CHANGE UP (ref 8.4–10.5)
CHLORIDE SERPL-SCNC: 92 MMOL/L — LOW (ref 96–108)
CO2 SERPL-SCNC: 27 MMOL/L — SIGNIFICANT CHANGE UP (ref 22–31)
CREAT SERPL-MCNC: 9.49 MG/DL — HIGH (ref 0.5–1.3)
EGFR: 6 ML/MIN/1.73M2 — LOW
EOSINOPHIL # BLD AUTO: 0 K/UL — SIGNIFICANT CHANGE UP (ref 0–0.5)
EOSINOPHIL NFR BLD AUTO: 0 % — SIGNIFICANT CHANGE UP (ref 0–6)
GLUCOSE SERPL-MCNC: 128 MG/DL — HIGH (ref 70–99)
HCT VFR BLD CALC: 27.8 % — LOW (ref 39–50)
HGB BLD-MCNC: 8.8 G/DL — LOW (ref 13–17)
IMM GRANULOCYTES NFR BLD AUTO: 0.9 % — SIGNIFICANT CHANGE UP (ref 0–0.9)
LYMPHOCYTES # BLD AUTO: 0.39 K/UL — LOW (ref 1–3.3)
LYMPHOCYTES # BLD AUTO: 6 % — LOW (ref 13–44)
MAGNESIUM SERPL-MCNC: 2.2 MG/DL — SIGNIFICANT CHANGE UP (ref 1.6–2.6)
MCHC RBC-ENTMCNC: 31.7 GM/DL — LOW (ref 32–36)
MCHC RBC-ENTMCNC: 33.1 PG — SIGNIFICANT CHANGE UP (ref 27–34)
MCV RBC AUTO: 104.5 FL — HIGH (ref 80–100)
MONOCYTES # BLD AUTO: 0.7 K/UL — SIGNIFICANT CHANGE UP (ref 0–0.9)
MONOCYTES NFR BLD AUTO: 10.7 % — SIGNIFICANT CHANGE UP (ref 2–14)
NEUTROPHILS # BLD AUTO: 5.37 K/UL — SIGNIFICANT CHANGE UP (ref 1.8–7.4)
NEUTROPHILS NFR BLD AUTO: 82.2 % — HIGH (ref 43–77)
NRBC # BLD: 0 /100 WBCS — SIGNIFICANT CHANGE UP (ref 0–0)
PHOSPHATE SERPL-MCNC: 7.6 MG/DL — HIGH (ref 2.5–4.5)
PLATELET # BLD AUTO: 157 K/UL — SIGNIFICANT CHANGE UP (ref 150–400)
POTASSIUM SERPL-MCNC: 4.7 MMOL/L — SIGNIFICANT CHANGE UP (ref 3.5–5.3)
POTASSIUM SERPL-SCNC: 4.7 MMOL/L — SIGNIFICANT CHANGE UP (ref 3.5–5.3)
PROT SERPL-MCNC: 7.8 G/DL — SIGNIFICANT CHANGE UP (ref 6–8.3)
RBC # BLD: 2.66 M/UL — LOW (ref 4.2–5.8)
RBC # FLD: 13.7 % — SIGNIFICANT CHANGE UP (ref 10.3–14.5)
SODIUM SERPL-SCNC: 127 MMOL/L — LOW (ref 135–145)
WBC # BLD: 6.53 K/UL — SIGNIFICANT CHANGE UP (ref 3.8–10.5)
WBC # FLD AUTO: 6.53 K/UL — SIGNIFICANT CHANGE UP (ref 3.8–10.5)

## 2024-01-31 PROCEDURE — 99222 1ST HOSP IP/OBS MODERATE 55: CPT

## 2024-01-31 PROCEDURE — 99232 SBSQ HOSP IP/OBS MODERATE 35: CPT

## 2024-01-31 RX ORDER — HYDROMORPHONE HYDROCHLORIDE 2 MG/ML
2 INJECTION INTRAMUSCULAR; INTRAVENOUS; SUBCUTANEOUS EVERY 4 HOURS
Refills: 0 | Status: DISCONTINUED | OUTPATIENT
Start: 2024-01-31 | End: 2024-02-02

## 2024-01-31 RX ORDER — CYCLOBENZAPRINE HYDROCHLORIDE 10 MG/1
5 TABLET, FILM COATED ORAL THREE TIMES A DAY
Refills: 0 | Status: DISCONTINUED | OUTPATIENT
Start: 2024-01-31 | End: 2024-02-02

## 2024-01-31 RX ORDER — HEPARIN SODIUM 5000 [USP'U]/ML
5000 INJECTION INTRAVENOUS; SUBCUTANEOUS EVERY 8 HOURS
Refills: 0 | Status: DISCONTINUED | OUTPATIENT
Start: 2024-01-31 | End: 2024-02-02

## 2024-01-31 RX ORDER — ACETAMINOPHEN 500 MG
1000 TABLET ORAL EVERY 8 HOURS
Refills: 0 | Status: DISCONTINUED | OUTPATIENT
Start: 2024-01-31 | End: 2024-02-02

## 2024-01-31 RX ORDER — MUPIROCIN 20 MG/G
1 OINTMENT TOPICAL
Refills: 0 | Status: DISCONTINUED | OUTPATIENT
Start: 2024-01-31 | End: 2024-02-02

## 2024-01-31 RX ORDER — ERYTHROPOIETIN 10000 [IU]/ML
10000 INJECTION, SOLUTION INTRAVENOUS; SUBCUTANEOUS
Refills: 0 | Status: DISCONTINUED | OUTPATIENT
Start: 2024-01-31 | End: 2024-02-02

## 2024-01-31 RX ADMIN — TIOTROPIUM BROMIDE 2 PUFF(S): 18 CAPSULE ORAL; RESPIRATORY (INHALATION) at 11:46

## 2024-01-31 RX ADMIN — SENNA PLUS 2 TABLET(S): 8.6 TABLET ORAL at 21:02

## 2024-01-31 RX ADMIN — HYDROMORPHONE HYDROCHLORIDE 0.5 MILLIGRAM(S): 2 INJECTION INTRAMUSCULAR; INTRAVENOUS; SUBCUTANEOUS at 05:28

## 2024-01-31 RX ADMIN — HEPARIN SODIUM 5000 UNIT(S): 5000 INJECTION INTRAVENOUS; SUBCUTANEOUS at 15:24

## 2024-01-31 RX ADMIN — QUETIAPINE FUMARATE 300 MILLIGRAM(S): 200 TABLET, FILM COATED ORAL at 15:23

## 2024-01-31 RX ADMIN — CARVEDILOL PHOSPHATE 25 MILLIGRAM(S): 80 CAPSULE, EXTENDED RELEASE ORAL at 05:07

## 2024-01-31 RX ADMIN — Medication 100 MILLIGRAM(S): at 16:09

## 2024-01-31 RX ADMIN — HYDROMORPHONE HYDROCHLORIDE 2 MILLIGRAM(S): 2 INJECTION INTRAMUSCULAR; INTRAVENOUS; SUBCUTANEOUS at 13:18

## 2024-01-31 RX ADMIN — Medication 200 MILLIGRAM(S): at 20:51

## 2024-01-31 RX ADMIN — ESCITALOPRAM OXALATE 10 MILLIGRAM(S): 10 TABLET, FILM COATED ORAL at 11:46

## 2024-01-31 RX ADMIN — Medication 100 MILLIGRAM(S): at 05:08

## 2024-01-31 RX ADMIN — HYDROMORPHONE HYDROCHLORIDE 2 MILLIGRAM(S): 2 INJECTION INTRAMUSCULAR; INTRAVENOUS; SUBCUTANEOUS at 18:08

## 2024-01-31 RX ADMIN — Medication 25 MILLIGRAM(S): at 05:07

## 2024-01-31 RX ADMIN — Medication 100 MILLIGRAM(S): at 11:47

## 2024-01-31 RX ADMIN — HYDROMORPHONE HYDROCHLORIDE 0.25 MILLIGRAM(S): 2 INJECTION INTRAMUSCULAR; INTRAVENOUS; SUBCUTANEOUS at 01:51

## 2024-01-31 RX ADMIN — RISPERIDONE 2 MILLIGRAM(S): 4 TABLET ORAL at 05:07

## 2024-01-31 RX ADMIN — CALCITRIOL 0.5 MICROGRAM(S): 0.5 CAPSULE ORAL at 15:23

## 2024-01-31 RX ADMIN — HYDROMORPHONE HYDROCHLORIDE 0.5 MILLIGRAM(S): 2 INJECTION INTRAMUSCULAR; INTRAVENOUS; SUBCUTANEOUS at 05:08

## 2024-01-31 RX ADMIN — CYCLOBENZAPRINE HYDROCHLORIDE 5 MILLIGRAM(S): 10 TABLET, FILM COATED ORAL at 17:07

## 2024-01-31 RX ADMIN — Medication 100 MILLIGRAM(S): at 15:27

## 2024-01-31 RX ADMIN — HALOPERIDOL DECANOATE 2 MILLIGRAM(S): 100 INJECTION INTRAMUSCULAR at 11:46

## 2024-01-31 RX ADMIN — AMLODIPINE BESYLATE 5 MILLIGRAM(S): 2.5 TABLET ORAL at 05:07

## 2024-01-31 RX ADMIN — HEPARIN SODIUM 5000 UNIT(S): 5000 INJECTION INTRAVENOUS; SUBCUTANEOUS at 21:01

## 2024-01-31 RX ADMIN — CHLORHEXIDINE GLUCONATE 1 APPLICATION(S): 213 SOLUTION TOPICAL at 11:48

## 2024-01-31 RX ADMIN — HYDROMORPHONE HYDROCHLORIDE 2 MILLIGRAM(S): 2 INJECTION INTRAMUSCULAR; INTRAVENOUS; SUBCUTANEOUS at 12:18

## 2024-01-31 RX ADMIN — BUDESONIDE AND FORMOTEROL FUMARATE DIHYDRATE 2 PUFF(S): 160; 4.5 AEROSOL RESPIRATORY (INHALATION) at 00:11

## 2024-01-31 RX ADMIN — Medication 1000 MILLIGRAM(S): at 21:02

## 2024-01-31 RX ADMIN — SEVELAMER CARBONATE 800 MILLIGRAM(S): 2400 POWDER, FOR SUSPENSION ORAL at 15:23

## 2024-01-31 RX ADMIN — HYDROMORPHONE HYDROCHLORIDE 2 MILLIGRAM(S): 2 INJECTION INTRAMUSCULAR; INTRAVENOUS; SUBCUTANEOUS at 17:08

## 2024-01-31 RX ADMIN — BUDESONIDE AND FORMOTEROL FUMARATE DIHYDRATE 2 PUFF(S): 160; 4.5 AEROSOL RESPIRATORY (INHALATION) at 09:41

## 2024-01-31 RX ADMIN — HYDROMORPHONE HYDROCHLORIDE 0.25 MILLIGRAM(S): 2 INJECTION INTRAMUSCULAR; INTRAVENOUS; SUBCUTANEOUS at 02:13

## 2024-01-31 RX ADMIN — Medication 100 MILLIGRAM(S): at 21:01

## 2024-01-31 RX ADMIN — CYCLOBENZAPRINE HYDROCHLORIDE 5 MILLIGRAM(S): 10 TABLET, FILM COATED ORAL at 21:01

## 2024-01-31 RX ADMIN — MIRTAZAPINE 15 MILLIGRAM(S): 45 TABLET, ORALLY DISINTEGRATING ORAL at 11:46

## 2024-01-31 RX ADMIN — ERYTHROPOIETIN 10000 UNIT(S): 10000 INJECTION, SOLUTION INTRAVENOUS; SUBCUTANEOUS at 17:51

## 2024-01-31 RX ADMIN — Medication 1000 MILLIGRAM(S): at 21:33

## 2024-01-31 RX ADMIN — Medication 100 MILLIGRAM(S): at 05:09

## 2024-01-31 RX ADMIN — Medication 100 MILLIGRAM(S): at 21:11

## 2024-01-31 RX ADMIN — SEVELAMER CARBONATE 800 MILLIGRAM(S): 2400 POWDER, FOR SUSPENSION ORAL at 09:41

## 2024-01-31 RX ADMIN — ATORVASTATIN CALCIUM 40 MILLIGRAM(S): 80 TABLET, FILM COATED ORAL at 21:02

## 2024-01-31 RX ADMIN — POLYETHYLENE GLYCOL 3350 17 GRAM(S): 17 POWDER, FOR SOLUTION ORAL at 11:47

## 2024-01-31 NOTE — PROGRESS NOTE ADULT - ASSESSMENT
43M s/p adrianoo lap cholecystectomy POD#1     PLAN   - Advance diet as tolerated   - Pain control PRN   - Monitor MAURIZIO output   - PT consult for ambulation   - IS  - Remainder of care per primary team

## 2024-01-31 NOTE — CONSULT NOTE ADULT - ASSESSMENT
Confidential Drug Utilization Report  Search Terms: Ayana Mace, 1980Search Date: 01/31/2024 16:27:32 PM  The Drug Utilization Report below displays all of the controlled substance prescriptions, if any, that your patient has filled in the last twelve months. The information displayed on this report is compiled from pharmacy submissions to the Department, and accurately reflects the information as submitted by the pharmacies.    This report was requested by: Stephanie Barnes | Reference #: [id number]    You have not added a MURIEL number. Keeping your MURIEL number(s) up to date on the My MURIEL # tab will enable the separation of your prescriptions from others in the search results.    Practitioner Count: 0  Pharmacy Count: 0  Current Opioid Prescriptions: 0  Current Benzodiazepine Prescriptions: 0  Current Stimulant Prescriptions: 0      Patient Demographic Information (PDI)       PDI	First Name	Last Name	Birth Date	Gender	Street Address	[facility] Code  A	Ayana Garcia	Cuascu	1980	Male	[address]	[facility]	[id number]    Prescription Information      PDI Filter:    PDI	Current Rx	Drug Type	Rx Written	Rx Dispensed	Drug	Quantity	Days Supply	Prescriber Name	Prescriber MURIEL #	Payment Method	Dispenser  A	N	O	02/21/2023	02/22/2023	oxycodone-acetaminophen 5-325 mg tab	15	4	Romy Lucero E (MD)	[id number]	[facility]    * - Details of Drug Type : O = Opioid, B = Benzodiazepine, S = Stimulant    * - Drugs marked with an asterisk are compound drugs. If the compound drug is made up of more than one controlled substance, then each controlled substance will be a separate row in the table.

## 2024-01-31 NOTE — PROGRESS NOTE ADULT - PROBLEM SELECTOR PLAN 10
s/p robot lap javier 1/30   advanced diet today to regular  MAURIZIO drain monitor   pain control s/p robot lap javier 1/30   advanced diet today to regular  MAURIZIO drain monitor   pain control  pending PT eval - post op eval  possible dc in 48hours - needs transportation

## 2024-01-31 NOTE — PROGRESS NOTE ADULT - PROBLEM SELECTOR PLAN 7
heparin h/o schizophrenia  takes trazodone, hydroxyzine, mirtazapine, risperidone, escitalopram and quetiapine at home  cont home meds

## 2024-01-31 NOTE — PROGRESS NOTE ADULT - SUBJECTIVE AND OBJECTIVE BOX
[facility] : Progress Note :: [phone], (office [phone]),   Dr La / Dr Hui / Dr Barber / Dr Villalobos / Dr Hang CASTELLANO / Dr Mehta / Dr Sanchez / Dr Alber dumont  _____________________________________________________________________________________________    S/P robotic lap javier.    shellfish (Anaphylaxis)  shellfish (Rash)  penicillins (Swelling)  fish (Unknown)  [place] (Anaphylaxis)  penicillin (Anaphylaxis)  Seafood (Hives)  aspirin (Swelling)    Hospital Medications:   MEDICATIONS  (STANDING):  acetaminophen     Tablet .. 1000 milliGRAM(s) Oral every 8 hours  amLODIPine   Tablet 5 milliGRAM(s) Oral daily  atorvastatin 40 milliGRAM(s) Oral at bedtime  budesonide 160 MICROgram(s)/formoterol 4.5 MICROgram(s) Inhaler 2 Puff(s) Inhalation two times a day  calcitriol   Capsule 0.5 MICROGram(s) Oral daily  carvedilol 25 milliGRAM(s) Oral every 12 hours  chlorhexidine 2% Cloths 1 Application(s) Topical daily  ciprofloxacin   IVPB 400 milliGRAM(s) IV Intermittent every 24 hours  cyclobenzaprine 5 milliGRAM(s) Oral three times a day  epoetin cyndie (PROCRIT) Injectable 02730 Unit(s) IV Push <User Schedule>  escitalopram 10 milliGRAM(s) Oral daily  haloperidol     Tablet 2 milliGRAM(s) Oral daily  heparin   Injectable 5000 Unit(s) SubCutaneous every 8 hours  hydrALAZINE 100 milliGRAM(s) Oral three times a day  hydrOXYzine hydrochloride 25 milliGRAM(s) Oral every 12 hours  metroNIDAZOLE  IVPB 500 milliGRAM(s) IV Intermittent every 8 hours  mirtazapine 15 milliGRAM(s) Oral daily  mupirocin 2% Ointment 1 Application(s) Both Nostrils two times a day  polyethylene glycol 3350 17 Gram(s) Oral daily  QUEtiapine  milliGRAM(s) Oral daily  risperiDONE   Tablet 2 milliGRAM(s) Oral two times a day  senna 2 Tablet(s) Oral at bedtime  sevelamer carbonate 800 milliGRAM(s) Oral three times a day with meals  tiotropium 2.5 MICROgram(s) Inhaler 2 Puff(s) Inhalation daily  traZODone 100 milliGRAM(s) Oral daily        VITALS:  T(F): 98.2 (01-31-24 @ 13:44), Max: 98.8 (01-30-24 @ 22:01)  HR: 68 (01-31-24 @ 13:44)  BP: 127/69 (01-31-24 @ 13:44)  RR: 17 (01-31-24 @ 13:44)  SpO2: 100% (01-31-24 @ 13:44)  Wt(kg): --    01-30 @ 07:01  -  01-31 @ 07:00  --------------------------------------------------------  IN: 450 mL / OUT: 230 mL / NET: 220 mL    01-31 @ 07:01 - 01-31 @ 18:24  --------------------------------------------------------  IN: 0 mL / OUT: 30 mL / NET: -30 mL        PHYSICAL EXAM:  Constitutional: Obese.  HEENT: anicteric sclera, oropharynx clear.  Neck: No JVD  Respiratory: CTAB, no wheezes, rales or rhonchi  Cardiovascular: S1, S2, RRR  Gastrointestinal: BS+, soft, NT/ND  Extremities:  No peripheral edema  Neurological: A/O x 3, no focal deficits.  Vascular Access: AVF with thrill and bruit     LABS:  01-31    127<L>  |  92<L>  |  46<H>  ----------------------------<  128<H>  4.7   |  27  |  9.49<H>    Ca    9.9      31 Jan 2024 06:03  Phos  7.6     01-31  Mg     2.2     01-31    TPro  7.8  /  Alb  3.3<L>  /  TBili  0.6  /  DBili      /  AST  41<H>  /  ALT  62<H>  /  AlkPhos  89  01-31    Creatinine Trend: 9.49 <--, 7.82 <--, 10.10 <--, 8.84 <--, 7.28 <--, 8.40 <--, 11.50 <--, 11.30 <--                        8.8    6.53  )-----------( 157      ( 31 Jan 2024 06:03 )             27.8     Urine Studies:  Urinalysis Basic - ( 31 Jan 2024 06:03 )    Color:  / Appearance:  / SG:  / pH:   Gluc: 128 mg/dL / Ketone:   / Bili:  / Urobili:    Blood:  / Protein:  / Nitrite:    Leuk Esterase:  / RBC:  / WBC    Sq Epi:  / Non Sq Epi:  / Bacteria:         RADIOLOGY & ADDITIONAL STUDIES:

## 2024-01-31 NOTE — PROGRESS NOTE ADULT - PROBLEM SELECTOR PLAN 2
US RUQ: Possible acute cholecystitis  keep NPO while vomiting. Advance diet as tolerated  pain control, tylenol for mild, 0,25 dilaudid q6 for moderate and 0.5 dilaudid q6 for severe pain  cont IV fluids for 24 hours at 100cc/hr  -Lap javier Tuesday 1/30  NPO after midnight US RUQ: Possible acute cholecystitis  pt was on NPO initially, s/p robot lap javier POD #1   Advance diet as tolerated - regular today   pain control - currently on P.O dilaudid 2mg q 4hours PRN   Flagyl and cipro D7 US RUQ: Possible acute cholecystitis  pt was on NPO initially, s/p robot lap javier POD #1   Advance diet as tolerated - regular today   pain control - currently on P.O dilaudid 2mg q 4hours PRN   Flagyl and cipro D6

## 2024-01-31 NOTE — PROGRESS NOTE ADULT - PROBLEM SELECTOR PLAN 1
No gallstones seen on RUQ US  p/w with nausea, vomiting, and diarrhea x 1 week  no recent travel, no foods or sick contacts  umbilical, epigastric and RUQ abdominal regions TTP on admission  lipase 304    CT AP: -Mild nodular contour of the liver may represent cirrhosis. Splenomegaly and mild varices, suggestive of   associated portal hypertension.   -Mild stranding adjacent to the pancreatic head and body, compatible with acute pancreatitis. No evidence for pancreatic necrosis.  -Pericholecystic stranding is again noted. No gallstones seen on RUQ US  p/w with nausea, vomiting, and diarrhea x 1 week  lipase 304    CT AP: -Mild nodular contour of the liver may represent cirrhosis. Splenomegaly and mild varices, suggestive of   associated portal hypertension.   -Mild stranding adjacent to the pancreatic head and body, compatible with acute pancreatitis. No evidence for pancreatic necrosis.  -Pericholecystic stranding is again noted  pain resolved, s/p IVF, advanced diet to regular today

## 2024-01-31 NOTE — PROGRESS NOTE ADULT - SUBJECTIVE AND OBJECTIVE BOX
INTERVAL HPI/OVERNIGHT EVENTS: NAEO. AVSS. Patient seen and examined at bedside.  Tolerating CLD  C/o RUQ / incisional pain. Denies N/V, fevers, chills.     Vital Signs Last 24 Hrs  T(C): 36.4 (31 Jan 2024 06:05), Max: 37.1 (30 Jan 2024 22:01)  T(F): 97.6 (31 Jan 2024 06:05), Max: 98.8 (30 Jan 2024 22:01)  HR: 69 (31 Jan 2024 06:05) (66 - 85)  BP: 137/75 (31 Jan 2024 06:05) (106/55 - 148/69)  BP(mean): 97 (30 Jan 2024 21:16) (8 - 97)  RR: 17 (31 Jan 2024 06:05) (12 - 26)  SpO2: 98% (31 Jan 2024 06:05) (94% - 100%)    Parameters below as of 31 Jan 2024 06:05  Patient On (Oxygen Delivery Method): nasal cannula  O2 Flow (L/min): 4    I&O's Detail    30 Jan 2024 07:01  -  31 Jan 2024 07:00  --------------------------------------------------------  IN:    Lactated Ringers Bolus: 450 mL  Total IN: 450 mL    OUT:    Bulb (mL): 230 mL  Total OUT: 230 mL    Total NET: 220 mL        aluminum hydroxide/magnesium hydroxide/simethicone Suspension 30 milliLiter(s) Oral every 4 hours PRN  ciprofloxacin   IVPB 400 milliGRAM(s) IV Intermittent every 24 hours  metroNIDAZOLE  IVPB 500 milliGRAM(s) IV Intermittent every 8 hours  polyethylene glycol 3350 17 Gram(s) Oral daily  senna 2 Tablet(s) Oral at bedtime  sevelamer carbonate 800 milliGRAM(s) Oral three times a day with meals      Physical Exam:  General: AAOx3, No acute distress  HEENT: NC/AT, trachea midline  Respiratory: Nonlabored breathing, equal chest rise b/l   Skin: No jaundice, no icterus  Abdomen: obese, soft, appropriately tender. incision sites with steri strips c/d/i. MAURIZIO with SS output.   Extremities: RUE fistula    Lines/Drains/Tubes:     Drains/Tubes:     01-30-24 @ 07:01  -  01-31-24 @ 07:00  --------------------------------------------------------  IN: 450 mL / OUT: 230 mL / NET: 220 mL        Labs:                        8.8    6.53  )-----------( 157      ( 31 Jan 2024 06:03 )             27.8     01-31    127<L>  |  92<L>  |  46<H>  ----------------------------<  128<H>  4.7   |  27  |  9.49<H>    Ca    9.9      31 Jan 2024 06:03  Phos  7.6     01-31  Mg     2.2     01-31    TPro  7.8  /  Alb  3.3<L>  /  TBili  0.6  /  DBili  x   /  AST  41<H>  /  ALT  62<H>  /  AlkPhos  89  01-31    PT/INR - ( 30 Jan 2024 06:30 )   PT: 12.6 sec;   INR: 1.11 ratio         PTT - ( 30 Jan 2024 06:30 )  PTT:33.4 sec    RADIOLOGY & ADDITIONAL STUDIES:

## 2024-01-31 NOTE — PROGRESS NOTE ADULT - SUBJECTIVE AND OBJECTIVE BOX
NP Note discussed with  Primary Attending    Patient is a 43y old  Male who presents with a chief complaint of acute pancreatitis (31 Jan 2024 09:03)      INTERVAL HPI/OVERNIGHT EVENTS: no new complaints    MEDICATIONS  (STANDING):  amLODIPine   Tablet 5 milliGRAM(s) Oral daily  atorvastatin 40 milliGRAM(s) Oral at bedtime  budesonide 160 MICROgram(s)/formoterol 4.5 MICROgram(s) Inhaler 2 Puff(s) Inhalation two times a day  calcitriol   Capsule 0.5 MICROGram(s) Oral daily  carvedilol 25 milliGRAM(s) Oral every 12 hours  chlorhexidine 2% Cloths 1 Application(s) Topical daily  ciprofloxacin   IVPB 400 milliGRAM(s) IV Intermittent every 24 hours  epoetin cyndie (PROCRIT) Injectable 28895 Unit(s) IV Push <User Schedule>  escitalopram 10 milliGRAM(s) Oral daily  haloperidol     Tablet 2 milliGRAM(s) Oral daily  hydrALAZINE 100 milliGRAM(s) Oral three times a day  hydrOXYzine hydrochloride 25 milliGRAM(s) Oral every 12 hours  metroNIDAZOLE  IVPB 500 milliGRAM(s) IV Intermittent every 8 hours  mirtazapine 15 milliGRAM(s) Oral daily  mupirocin 2% Ointment 1 Application(s) Both Nostrils two times a day  polyethylene glycol 3350 17 Gram(s) Oral daily  QUEtiapine  milliGRAM(s) Oral daily  risperiDONE   Tablet 2 milliGRAM(s) Oral two times a day  senna 2 Tablet(s) Oral at bedtime  sevelamer carbonate 800 milliGRAM(s) Oral three times a day with meals  tiotropium 2.5 MICROgram(s) Inhaler 2 Puff(s) Inhalation daily  traZODone 100 milliGRAM(s) Oral daily    MEDICATIONS  (PRN):  acetaminophen     Tablet .. 650 milliGRAM(s) Oral every 6 hours PRN Temp greater or equal to 38C (100.4F), Mild Pain (1 - 3)  albuterol    90 MICROgram(s) HFA Inhaler 2 Puff(s) Inhalation every 6 hours PRN Shortness of Breath and/or Wheezing  aluminum hydroxide/magnesium hydroxide/simethicone Suspension 30 milliLiter(s) Oral every 4 hours PRN Dyspepsia  HYDROmorphone   Tablet 2 milliGRAM(s) Oral every 4 hours PRN Severe Pain (7 - 10)  HYDROmorphone  Injectable 0.25 milliGRAM(s) IV Push every 6 hours PRN Moderate Pain (4 - 6)  melatonin 3 milliGRAM(s) Oral at bedtime PRN Insomnia  ondansetron Injectable 4 milliGRAM(s) IV Push every 8 hours PRN Nausea and/or Vomiting      __________________________________________________  REVIEW OF SYSTEMS:    CONSTITUTIONAL: No fever,   EYES: no acute visual disturbances  NECK: No pain or stiffness  RESPIRATORY: No cough; No shortness of breath  CARDIOVASCULAR: No chest pain, no palpitations  GASTROINTESTINAL: No pain. No nausea or vomiting; No diarrhea   NEUROLOGICAL: No headache or numbness, no tremors  MUSCULOSKELETAL: No joint pain, no muscle pain  GENITOURINARY: no dysuria, no frequency, no hesitancy  PSYCHIATRY: no depression , no anxiety  ALL OTHER  ROS negative        Vital Signs Last 24 Hrs  T(C): 36.4 (31 Jan 2024 06:05), Max: 37.1 (30 Jan 2024 22:01)  T(F): 97.6 (31 Jan 2024 06:05), Max: 98.8 (30 Jan 2024 22:01)  HR: 69 (31 Jan 2024 06:05) (66 - 85)  BP: 137/75 (31 Jan 2024 06:05) (106/55 - 148/69)  BP(mean): 97 (30 Jan 2024 21:16) (8 - 97)  RR: 17 (31 Jan 2024 06:05) (12 - 26)  SpO2: 98% (31 Jan 2024 06:05) (94% - 100%)    Parameters below as of 31 Jan 2024 06:05  Patient On (Oxygen Delivery Method): nasal cannula  O2 Flow (L/min): 4      ________________________________________________  PHYSICAL EXAM:  GENERAL: NAD  HEENT: Normocephalic;  conjunctivae and sclerae clear; moist mucous membranes;   NECK : supple  CHEST/LUNG: Clear to auscultation bilaterally with good air entry   HEART: S1 S2  regular; no murmurs, gallops or rubs  ABDOMEN: Soft, Nontender, Nondistended; Bowel sounds present  EXTREMITIES: no cyanosis; no edema; no calf tenderness  SKIN: warm and dry; no rash  NERVOUS SYSTEM:  Awake and alert; Oriented  to place, person and time ; no new deficits    _________________________________________________  LABS:                        8.8    6.53  )-----------( 157      ( 31 Jan 2024 06:03 )             27.8     01-31    127<L>  |  92<L>  |  46<H>  ----------------------------<  128<H>  4.7   |  27  |  9.49<H>    Ca    9.9      31 Jan 2024 06:03  Phos  7.6     01-31  Mg     2.2     01-31    TPro  7.8  /  Alb  3.3<L>  /  TBili  0.6  /  DBili  x   /  AST  41<H>  /  ALT  62<H>  /  AlkPhos  89  01-31    PT/INR - ( 30 Jan 2024 06:30 )   PT: 12.6 sec;   INR: 1.11 ratio         PTT - ( 30 Jan 2024 06:30 )  PTT:33.4 sec  Urinalysis Basic - ( 31 Jan 2024 06:03 )    Color: x / Appearance: x / SG: x / pH: x  Gluc: 128 mg/dL / Ketone: x  / Bili: x / Urobili: x   Blood: x / Protein: x / Nitrite: x   Leuk Esterase: x / RBC: x / WBC x   Sq Epi: x / Non Sq Epi: x / Bacteria: x      CAPILLARY BLOOD GLUCOSE      POCT Blood Glucose.: 116 mg/dL (30 Jan 2024 22:15)  POCT Blood Glucose.: 100 mg/dL (30 Jan 2024 19:13)        RADIOLOGY & ADDITIONAL TESTS:    Imaging Personally Reviewed:  YES/NO    Consultant(s) Notes Reviewed:   YES/ No    Care Discussed with Consultants :     Plan of care was discussed with patient and /or primary care giver; all questions and concerns were addressed and care was aligned with patient's wishes.     NP Note discussed with  Primary Attending    Patient is a 43y old  Male who presents with a chief complaint of acute pancreatitis (31 Jan 2024 09:03)      INTERVAL HPI/OVERNIGHT EVENTS: no new complaints    MEDICATIONS  (STANDING):  amLODIPine   Tablet 5 milliGRAM(s) Oral daily  atorvastatin 40 milliGRAM(s) Oral at bedtime  budesonide 160 MICROgram(s)/formoterol 4.5 MICROgram(s) Inhaler 2 Puff(s) Inhalation two times a day  calcitriol   Capsule 0.5 MICROGram(s) Oral daily  carvedilol 25 milliGRAM(s) Oral every 12 hours  chlorhexidine 2% Cloths 1 Application(s) Topical daily  ciprofloxacin   IVPB 400 milliGRAM(s) IV Intermittent every 24 hours  epoetin cyndie (PROCRIT) Injectable 75427 Unit(s) IV Push <User Schedule>  escitalopram 10 milliGRAM(s) Oral daily  haloperidol     Tablet 2 milliGRAM(s) Oral daily  hydrALAZINE 100 milliGRAM(s) Oral three times a day  hydrOXYzine hydrochloride 25 milliGRAM(s) Oral every 12 hours  metroNIDAZOLE  IVPB 500 milliGRAM(s) IV Intermittent every 8 hours  mirtazapine 15 milliGRAM(s) Oral daily  mupirocin 2% Ointment 1 Application(s) Both Nostrils two times a day  polyethylene glycol 3350 17 Gram(s) Oral daily  QUEtiapine  milliGRAM(s) Oral daily  risperiDONE   Tablet 2 milliGRAM(s) Oral two times a day  senna 2 Tablet(s) Oral at bedtime  sevelamer carbonate 800 milliGRAM(s) Oral three times a day with meals  tiotropium 2.5 MICROgram(s) Inhaler 2 Puff(s) Inhalation daily  traZODone 100 milliGRAM(s) Oral daily    MEDICATIONS  (PRN):  acetaminophen     Tablet .. 650 milliGRAM(s) Oral every 6 hours PRN Temp greater or equal to 38C (100.4F), Mild Pain (1 - 3)  albuterol    90 MICROgram(s) HFA Inhaler 2 Puff(s) Inhalation every 6 hours PRN Shortness of Breath and/or Wheezing  aluminum hydroxide/magnesium hydroxide/simethicone Suspension 30 milliLiter(s) Oral every 4 hours PRN Dyspepsia  HYDROmorphone   Tablet 2 milliGRAM(s) Oral every 4 hours PRN Severe Pain (7 - 10)  HYDROmorphone  Injectable 0.25 milliGRAM(s) IV Push every 6 hours PRN Moderate Pain (4 - 6)  melatonin 3 milliGRAM(s) Oral at bedtime PRN Insomnia  ondansetron Injectable 4 milliGRAM(s) IV Push every 8 hours PRN Nausea and/or Vomiting      __________________________________________________  REVIEW OF SYSTEMS:    CONSTITUTIONAL: No fever,   EYES: no acute visual disturbances  NECK: No pain or stiffness  RESPIRATORY: No cough; No shortness of breath  CARDIOVASCULAR: No chest pain, no palpitations  GASTROINTESTINAL: No pain. No nausea or vomiting; No diarrhea only pain to surgical site   NEUROLOGICAL: No headache or numbness, no tremors  MUSCULOSKELETAL: No joint pain, no muscle pain  GENITOURINARY: no dysuria, no frequency, no hesitancy  PSYCHIATRY: no depression , no anxiety  ALL OTHER  ROS negative        Vital Signs Last 24 Hrs  T(C): 36.4 (31 Jan 2024 06:05), Max: 37.1 (30 Jan 2024 22:01)  T(F): 97.6 (31 Jan 2024 06:05), Max: 98.8 (30 Jan 2024 22:01)  HR: 69 (31 Jan 2024 06:05) (66 - 85)  BP: 137/75 (31 Jan 2024 06:05) (106/55 - 148/69)  BP(mean): 97 (30 Jan 2024 21:16) (8 - 97)  RR: 17 (31 Jan 2024 06:05) (12 - 26)  SpO2: 98% (31 Jan 2024 06:05) (94% - 100%)    Parameters below as of 31 Jan 2024 06:05  Patient On (Oxygen Delivery Method): nasal cannula  O2 Flow (L/min): 4      ________________________________________________  PHYSICAL EXAM:  GENERAL: NAD  HEENT: Normocephalic;  conjunctivae and sclerae clear; moist mucous membranes;   NECK : supple  CHEST/LUNG: Clear to auscultation bilaterally with good air entry   HEART: S1 S2  regular; no murmurs, gallops or rubs  ABDOMEN: Soft, Nontender, Nondistended; Bowel sounds present MAURIZIO draine- secured dressing   EXTREMITIES: no cyanosis; no edema; no calf tenderness SHERIF -old AVF/ Rt lower arm + AVF with good thrills and bruits   SKIN: warm and dry; no rash  NERVOUS SYSTEM:  Awake and alert; Oriented  to place, person and time ; no new deficits    _________________________________________________  LABS:                        8.8    6.53  )-----------( 157      ( 31 Jan 2024 06:03 )             27.8     01-31    127<L>  |  92<L>  |  46<H>  ----------------------------<  128<H>  4.7   |  27  |  9.49<H>    Ca    9.9      31 Jan 2024 06:03  Phos  7.6     01-31  Mg     2.2     01-31    TPro  7.8  /  Alb  3.3<L>  /  TBili  0.6  /  DBili  x   /  AST  41<H>  /  ALT  62<H>  /  AlkPhos  89  01-31    PT/INR - ( 30 Jan 2024 06:30 )   PT: 12.6 sec;   INR: 1.11 ratio         PTT - ( 30 Jan 2024 06:30 )  PTT:33.4 sec  Urinalysis Basic - ( 31 Jan 2024 06:03 )    Color: x / Appearance: x / SG: x / pH: x  Gluc: 128 mg/dL / Ketone: x  / Bili: x / Urobili: x   Blood: x / Protein: x / Nitrite: x   Leuk Esterase: x / RBC: x / WBC x   Sq Epi: x / Non Sq Epi: x / Bacteria: x      CAPILLARY BLOOD GLUCOSE      POCT Blood Glucose.: 116 mg/dL (30 Jan 2024 22:15)  POCT Blood Glucose.: 100 mg/dL (30 Jan 2024 19:13)        RADIOLOGY & ADDITIONAL TESTS:  < from: MR MRCP No Cont (01.28.24 @ 12:59) >    ACC: [id number] EXAM:  MR MRCP   ORDERED BY: NEREYDA JUAREZ     PROCEDURE DATE:  01/28/2024          INTERPRETATION:  CLINICAL INFORMATION: Acute pancreatitis.    COMPARISON: No prior abdominal MR is available for comparison. Reference   is made with a previous abdominal CT dated 1/25/2024 and abdominal   ultrasound dated 1/26/2024    CONTRAST/COMPLICATIONS:  IV Contrast: NONE  Oral Contrast: NONE  Complications: None reported at time of study completion    PROCEDURE:  MRI of the abdomen was performed. The patient was severely   claustrophobic. The patient refused to continue the examination; only   coronal T2-weighted sequence was acquired.    FINDINGS: The examination is severely limited due to reason above.  LOWER CHEST: Within normal limits.    LIVER: The liver appears diffusely dark on T2-weighted images.  BILE DUCTS: Normal caliber. The common bile duct measures 3 mm in caliber   which is within normal limits. No gross evidence for choledocholithiasis.    GALLBLADDER: Gallstones noted on the recent abdominal ultrasound dated   1/26/2024 are not visualized at MR. Pericholecystic edema is also noted.   If there is a clinical suspicion for acute cholecystitis, HIDA scan may   be pursued for further evaluation.  SPLEEN: Splenomegaly measuring 15.6 cm. The spleen appears diffusely dark   on T2-weighted images.  PANCREAS: Mild peripancreatic edema, compatible with patient's known   acute pancreatitis.  ADRENALS: Within normal limits.  KIDNEYS/URETERS: Atrophic kidneys bilaterally. Multiple small brightly T2   hyperintense lesions in the kidneys bilaterally, representing probable   cysts.    VISUALIZED PORTIONS:  BOWEL: Within normal limits.  PERITONEUM: No ascites.  VESSELS: Within normal limits.  RETROPERITONEUM/LYMPH NODES: No lymphadenopathy.  ABDOMINAL WALL: Within normal limits.  BONES: Unremarkable.    IMPRESSION: Mild peripancreatic edema, compatible with patient's known   acute pancreatitis.    The common bile duct measures 3 mm in caliber which is within normal   limits. No gross evidence for choledocholithiasis.    Gallstones noted on the recent abdominal ultrasound dated 1/26/2024 are   not visualized at MR. Pericholecystic edema. If there is a clinical   suspicion for acute cholecystitis, HIDA scan may be pursued for further   evaluation.    Splenomegaly.    The liver and spleen appear diffusely dark on T2-weighted images.   Possibility of hemochromatosis is considered. Clinical correlation is   recommended.    --- End of Report ---            ERICK BOWERS MD; Attending Radiologist  This document has been electronically signed. Jan 29 2024  9:01AM    < end of copied text >  < from: US Abdomen Upper Quadrant Right (01.26.24 @ 09:22) >    ACC: [id number] EXAM:  US ABDOMEN RT UPR QUADRANT   ORDERED BY: CATHIE HORTA     *** ADDENDUM # 1 ***    Please note the report contains a voice transcription error. The   corrected line should read as follows: there is NO evidence of   cholelithiasis. Gallbladder wall thickening is noted. If there is concern   for cholecystitis HIDA scan can be obtained    --- End of Report ---    *** END OF ADDENDUM # 1 ***      PROCEDURE DATE:  01/26/2024          INTERPRETATION:  CLINICAL INFORMATION: Acute pancreatitis. Evaluate for   gallbladder disease.    COMPARISON: None available.    TECHNIQUE: Sonography of the right upper quadrant.    FINDINGS:  Liver: Enlarged (21.3 cm), homogeneous echotexture  Bile ducts: Common duct top normal in caliber at 6 mm. Correlate with   LFTs. If necessary, MRCP can be obtained.  Gallbladder: Cholelithiasis with diffuse gallbladder wall thickening.   Correlate with symptomatology. If there is concern for cholecystitis HIDA   scan can be obtained.  Pancreas: Visualized portions are within normal limits.  Right kidney: 6.6 cm. Atrophic and echogenic compatible with chronic   medical renal disease No hydronephrosis.  Ascites: None.  IVC: Visualized portions are within normal limits.    IMPRESSION:  Cholelithiasis. Possible acute cholecystitis. Consider HIDA scan if   symptomatology warrants.  Common duct top  normal. Correlate with LFTs if necessary, MRCP.  Additional findings as discussed      --- End of Report ---    ***Please see the addendum at the top of this report. It may contain   additional important information or changes.****        SUNSHINE LYLES MD; Attending Radiologist  This document has been electronically signed. Jan 26 2024  9:37AM  1st Addendum: SUNSHINE LYLES MD; Attending Radiologist  The first addendum was electronically signed on: Jan 26 2024 10:09AM.    < end of copied text >    Imaging Personally Reviewed:  YES    Consultant(s) Notes Reviewed:   YES    Care Discussed with Consultants : surgery  nephrology     Plan of care was discussed with patient and /or primary care giver; all questions and concerns were addressed and care was aligned with patient's wishes.

## 2024-01-31 NOTE — CONSULT NOTE ADULT - SUBJECTIVE AND OBJECTIVE BOX
Source of information: PRESTON JOHNSON, Chart review  Patient language: English  : n/a    HPI:  43M PMH HTN, schizophrenia, COPD (4L home O2), and ESRD on HD (MWF) presenting to the ED with nausea, vomtiing and diarrhea. Pt states that his symptoms started 1 week ago and he would have nausea and vomiting every time he tries to ingest any food. He has had > 5 episodes of NBNB vomiting and will have nonbloody watery loose stool after each episode. He denies trying any new foods, sick contacts or recent travel. He also reports sharp abdominal pain immediately after he tries to eat, located in the periumbilical and epigastric region, radiating to his chest, intermittent 10/10. Due to his symptoms, he was only able to complete 3 hours of his HD on Monday and missed his HD on Wednesday. He has been having some SOB and generalized swelling due to his missed HD. He denies any fevers, chills, constipation. numbness/tingling/weakness in extremities. He lives at home with his mother and ambulates independently  (25 Jan 2024 06:19)    MRCP demonstrates- Mild peripancreatic edema, compatible with patient's known acute pancreatitis.  The common bile duct measures 3 mm in caliber which is within normal limits. No gross evidence for choledocholithiasis.  Gallstones noted on the recent abdominal ultrasound dated 1/26/2024 are not visualized at MR. Pericholecystic edema. If there is a clinical suspicion for acute cholecystitis, HIDA scan may be pursued for further evaluation.  Splenomegaly.  The liver and spleen appear diffusely dark on T2-weighted images. Possibility of hemochromatosis is considered. Clinical correlation is recommended.    Pt is admitted for acute pancreatitis. S/p Robot-assisted cholecystectomy on 1/30 POD #1.  Pain consulted for abdominal pain. Pt seen and examined at bedside. Reports abdominal pain score 10/10  SCALE USED: (1-10 VNRS). Pt describes pain as constant, sharp, greatest over right upper abdomen, radiating to mid abdomen, not alleviated by PO pain medication, exacerbated by palpation. Pt requesting IV dilaudid, states that IV dilaudid was able to get rid of his pain completely. Pt also reports lumbar back pain, tightness, aching, non-radiating, rating pain score 10/10. Pt tolerating PO renal diet. Denies lethargy, chest pain, SOB, nausea, vomiting, constipation. Reports belching. Denies passing flatus. Educated pt on realistic functional pain goals.  Patient stated goal for pain control: to be able to take deep breaths, get out of bed to chair and ambulate with tolerable pain control. States he was able to ambulate with a walker today. Pt denies taking medications for pain at home. Plan to be discharged possibly 2/1.     PAST MEDICAL & SURGICAL HISTORY:  Morbid obesity with BMI of 40.0-44.9, adult      ESRD on dialysis      Bipolar disorder      DM (diabetes mellitus)      HTN (hypertension)      Migraine      COPD (chronic obstructive pulmonary disease)      Renal failure      Asthma with COPD      Diabetes mellitus, type 2      Hypertension      Schizophrenia      Anxiety      Schizophrenia      COPD (chronic obstructive pulmonary disease)      HTN (hypertension)      ESRD on dialysis      HLD (hyperlipidemia)      H/O hernia repair      S/P arteriovenous (AV) fistula creation  right side          FAMILY HISTORY:  FH: lymphoma (Sibling)    FH: rheumatoid arthritis (Sibling)        Social History:   [X ] Denies ETOH use, illicit drug use and smoking    Allergies    shellfish (Anaphylaxis)  shellfish (Rash)  penicillins (Swelling)  fish (Unknown)  Folcroft (Anaphylaxis)  penicillin (Anaphylaxis)  Seafood (Hives)  aspirin (Swelling)    MEDICATIONS  (STANDING):  acetaminophen     Tablet .. 1000 milliGRAM(s) Oral every 8 hours  amLODIPine   Tablet 5 milliGRAM(s) Oral daily  atorvastatin 40 milliGRAM(s) Oral at bedtime  budesonide 160 MICROgram(s)/formoterol 4.5 MICROgram(s) Inhaler 2 Puff(s) Inhalation two times a day  calcitriol   Capsule 0.5 MICROGram(s) Oral daily  carvedilol 25 milliGRAM(s) Oral every 12 hours  chlorhexidine 2% Cloths 1 Application(s) Topical daily  ciprofloxacin   IVPB 400 milliGRAM(s) IV Intermittent every 24 hours  cyclobenzaprine 5 milliGRAM(s) Oral three times a day  epoetin cyndie (PROCRIT) Injectable 56970 Unit(s) IV Push <User Schedule>  escitalopram 10 milliGRAM(s) Oral daily  haloperidol     Tablet 2 milliGRAM(s) Oral daily  heparin   Injectable 5000 Unit(s) SubCutaneous every 8 hours  hydrALAZINE 100 milliGRAM(s) Oral three times a day  hydrOXYzine hydrochloride 25 milliGRAM(s) Oral every 12 hours  metroNIDAZOLE  IVPB 500 milliGRAM(s) IV Intermittent every 8 hours  mirtazapine 15 milliGRAM(s) Oral daily  mupirocin 2% Ointment 1 Application(s) Both Nostrils two times a day  polyethylene glycol 3350 17 Gram(s) Oral daily  QUEtiapine  milliGRAM(s) Oral daily  risperiDONE   Tablet 2 milliGRAM(s) Oral two times a day  senna 2 Tablet(s) Oral at bedtime  sevelamer carbonate 800 milliGRAM(s) Oral three times a day with meals  tiotropium 2.5 MICROgram(s) Inhaler 2 Puff(s) Inhalation daily  traZODone 100 milliGRAM(s) Oral daily    MEDICATIONS  (PRN):  albuterol    90 MICROgram(s) HFA Inhaler 2 Puff(s) Inhalation every 6 hours PRN Shortness of Breath and/or Wheezing  aluminum hydroxide/magnesium hydroxide/simethicone Suspension 30 milliLiter(s) Oral every 4 hours PRN Dyspepsia  HYDROmorphone   Tablet 2 milliGRAM(s) Oral every 4 hours PRN Severe Pain (7 - 10)  melatonin 3 milliGRAM(s) Oral at bedtime PRN Insomnia  ondansetron Injectable 4 milliGRAM(s) IV Push every 8 hours PRN Nausea and/or Vomiting      Vital Signs Last 24 Hrs  T(C): 36.8 (31 Jan 2024 13:44), Max: 37.1 (30 Jan 2024 22:01)  T(F): 98.2 (31 Jan 2024 13:44), Max: 98.8 (30 Jan 2024 22:01)  HR: 68 (31 Jan 2024 13:44) (68 - 85)  BP: 127/69 (31 Jan 2024 13:44) (113/83 - 148/69)  BP(mean): 97 (30 Jan 2024 21:16) (8 - 97)  RR: 17 (31 Jan 2024 13:44) (12 - 26)  SpO2: 100% (31 Jan 2024 13:44) (94% - 100%)    Parameters below as of 31 Jan 2024 13:44  Patient On (Oxygen Delivery Method): nasal cannula  O2 Flow (L/min): 4      LABS: Reviewed.                          8.8    6.53  )-----------( 157      ( 31 Jan 2024 06:03 )             27.8     01-31    127<L>  |  92<L>  |  46<H>  ----------------------------<  128<H>  4.7   |  27  |  9.49<H>    Ca    9.9      31 Jan 2024 06:03  Phos  7.6     01-31  Mg     2.2     01-31    TPro  7.8  /  Alb  3.3<L>  /  TBili  0.6  /  DBili  x   /  AST  41<H>  /  ALT  62<H>  /  AlkPhos  89  01-31    PT/INR - ( 30 Jan 2024 06:30 )   PT: 12.6 sec;   INR: 1.11 ratio         PTT - ( 30 Jan 2024 06:30 )  PTT:33.4 sec  LIVER FUNCTIONS - ( 31 Jan 2024 06:03 )  Alb: 3.3 g/dL / Pro: 7.8 g/dL / ALK PHOS: 89 U/L / ALT: 62 U/L DA / AST: 41 U/L / GGT: x           Urinalysis Basic - ( 31 Jan 2024 06:03 )    Color: x / Appearance: x / SG: x / pH: x  Gluc: 128 mg/dL / Ketone: x  / Bili: x / Urobili: x   Blood: x / Protein: x / Nitrite: x   Leuk Esterase: x / RBC: x / WBC x   Sq Epi: x / Non Sq Epi: x / Bacteria: x      CAPILLARY BLOOD GLUCOSE      POCT Blood Glucose.: 116 mg/dL (30 Jan 2024 22:15)  POCT Blood Glucose.: 100 mg/dL (30 Jan 2024 19:13)        Radiology: Reviewed.   < from: MR MRCP No Cont (01.28.24 @ 12:59) >    ACC: [id number] EXAM:  MR MRCP   ORDERED BY: NEREYDA JUAREZ     PROCEDURE DATE:  01/28/2024          INTERPRETATION:  CLINICAL INFORMATION: Acute pancreatitis.    COMPARISON: No prior abdominal MR is available for comparison. Reference   is made with a previous abdominal CT dated 1/25/2024 and abdominal   ultrasound dated 1/26/2024    CONTRAST/COMPLICATIONS:  IV Contrast: NONE  Oral Contrast: NONE  Complications: None reported at time of study completion    PROCEDURE:  MRI of the abdomen was performed. The patient was severely   claustrophobic. The patient refused to continue the examination; only   coronal T2-weighted sequence was acquired.    FINDINGS: The examination is severely limited due to reason above.  LOWER CHEST: Within normal limits.    LIVER: The liver appears diffusely dark on T2-weighted images.  BILE DUCTS: Normal caliber. The common bile duct measures 3 mm in caliber   which is within normal limits. No gross evidence for choledocholithiasis.    GALLBLADDER: Gallstones noted on the recent abdominal ultrasound dated   1/26/2024 are not visualized at MR. Pericholecystic edema is also noted.   If there is a clinical suspicion for acute cholecystitis, HIDA scan may   be pursued for further evaluation.  SPLEEN: Splenomegaly measuring 15.6 cm. The spleen appears diffusely dark   on T2-weighted images.  PANCREAS: Mild peripancreatic edema, compatible with patient's known   acute pancreatitis.  ADRENALS: Within normal limits.  KIDNEYS/URETERS: Atrophic kidneys bilaterally. Multiple small brightly T2   hyperintense lesions in the kidneys bilaterally, representing probable   cysts.    VISUALIZED PORTIONS:  BOWEL: Within normal limits.  PERITONEUM: No ascites.  VESSELS: Within normal limits.  RETROPERITONEUM/LYMPH NODES: No lymphadenopathy.  ABDOMINAL WALL: Within normal limits.  BONES: Unremarkable.    IMPRESSION: Mild peripancreatic edema, compatible with patient's known   acute pancreatitis.    The common bile duct measures 3 mm in caliber which is within normal   limits. No gross evidence for choledocholithiasis.    Gallstones noted on the recent abdominal ultrasound dated 1/26/2024 are   not visualized at MR. Pericholecystic edema. If there is a clinical   suspicion for acute cholecystitis, HIDA scan may be pursued for further   evaluation.    Splenomegaly.    The liver and spleen appear diffusely dark on T2-weighted images.   Possibility of hemochromatosis is considered. Clinical correlation is   recommended.    --- End of Report ---            ERICK BOWERS MD; Attending Radiologist  This document has been electronically signed. Jan 29 2024  9:01AM    < end of copied text >      ORT Score -   Family Hx of substance abuse	Female	      Male  Alcohol 	                                           1                     3  Illegal drugs	                                   2                     3  Rx drugs                                           4 	                  4  Personal Hx of substance abuse		  Alcohol 	                                          3	                  3  Illegal drugs                                     4	                  4  Rx drugs                                            5 	                  5  Age between 16- 45 years	           1                     1  hx preadolescent sexual abuse	   3 	                  0  Psychological disease		  ADD, OCD, bipolar, schizophrenia   2	          2  Depression                                           1 	          1  Total: 3    a score of 3 or lower indicates low risk for opioid abuse		  a score of 4-7 indicates moderate risk for opioid abuse		  a score of 8 or higher indicates high risk for opioid abuse    REVIEW OF SYSTEMS:  CONSTITUTIONAL: No fever or fatigue  HEENT:  No difficulty hearing, no change in vision  NECK: No pain or stiffness  RESPIRATORY: hx COPD. asthma No cough, wheezing, chills or hemoptysis; No shortness of breath  CARDIOVASCULAR: No chest pain, palpitations, dizziness, or leg swelling  GASTROINTESTINAL: No loss of appetite, decreased PO intake. + abdominal pain. No nausea, vomiting; No diarrhea or constipation.   GENITOURINARY: No dysuria, frequency, hematuria, retention or incontinence  + hx ESRD on HD  MUSCULOSKELETAL: + lumbar back joint pain No jointswelling; no upper or lower motor strength weakness, no saddle anesthesia, bowel/bladder incontinence, no falls   NEURO: No headaches, No numbness/tingling b/l LE, No weakness  PSYCHIATRIC: + hx schizophrenia anxiety    PHYSICAL EXAM:  GENERAL:  Alert & Oriented X4, cooperative, NAD, Good concentration. Speech is clear.   RESPIRATORY: Respirations even and unlabored. Diminished to auscultation bilaterally; No rales, rhonchi, wheezing, or rubs + O2 2L NC  CARDIOVASCULAR: Normal S1/S2, regular rate and rhythm; No murmurs, rubs, or gallops. No JVD. + left AVF  GASTROINTESTINAL: + morbid obesity per BMI, Soft, + right upper quadrant tenderness, Nondistended; Bowel sounds present, hypoactive. + right MAURIZIO drain draining serosanguinous fluid; + midline abdominal dressing c/d/ i   PERIPHERAL VASCULAR:  Extremities warm without edema. 2+ Peripheral Pulses, No cyanosis, No calf tenderness  MUSCULOSKELETAL: Motor Strength 5/5 B/L upper and lower extremities; moves all extremities equally against gravity; ROM intact; negative SLR; + lumbar back tenderness on palpation   SKIN: Warm, dry, intact. No rashes, lesions, scars or wounds.     Risk factors associated with adverse outcomes related to opioid treatment  [ ]  Concurrent benzodiazepine use  [ ]  History/ Active substance use or alcohol use disorder  [X ] Psychiatric co-morbidity  [ ] Sleep apnea  [ X] COPD  [ ] BMI> 35  [ ] Liver dysfunction  [X ] Renal dysfunction  [ ] CHF  [ ] Smoker  [ ]  Age > 60 years    [X ]  [place]  Reviewed and Copied to Chart. See below.    Plan of care and goal oriented pain management treatment options were discussed with patient and /or primary care giver; all questions and concerns were addressed and care was aligned with patient's wishes.    Educated patient on goal oriented pain management treatment options

## 2024-01-31 NOTE — PROGRESS NOTE ADULT - PROBLEM SELECTOR PLAN 5
h/o schizophrenia  takes trazodone, hydroxyzine, mirtazapine, risperidone, escitalopram and quetiapine at home  cont home meds see dietitian evaluation   renal diet with fluids restriction  continue nephro supplement  encourage ambulation   continue to   life style modifications

## 2024-01-31 NOTE — PROGRESS NOTE ADULT - NS ATTEND AMEND GEN_ALL_CORE FT
Persistent RUQ / epigastric pain.   MRCP incomplete but images look like cholecystitis  Plan for cholecystectomy tomorrow  Diet as tolerated, NPO after midnight
The patient is known to me - he is in the process of getting bariatric surgery.   Currently admitted with vomiting / epigastric pain. Labs suggest biliary origin of the pain. Doesn't meet criteria for pancreatitis. ?Cholecystitis.     Will treat with Abx given the unclear picture - pancreatitis vs cholecystitis  Diet as tolerated  CT and RUQ US shows no gallstones, but is limited by patients habitus - please obtain MRCP    Plan for cholecystectomy on Tuesday to allow enough time to optimize the patient including HD, as d/w Nephrologist.
Patient seen/evaluated at bedside with the resident team on 1/31/24. I agree with the resident progress note/outlined plan of care. My independent findings and conclusions are documented.    no recurrence of emesis. abdominal pain much improved. + flatus, no bowel movement however.    PE: significant for AAOx3 NAD, lying comfortably flat  no wheezes/rhonchi  soft, obese, tenderness to palpation at surgical incision sites  RUE AVF with + thrill/bruit  no LE edema/cyanosis/clubbing    labs reviewed  cbc, bmp, lfts  serum sodium 127    EKG 1/25/25- NSR at 61 BPM, LAFB, 1st degree AVB, no TITO/STD, q waves      43M PMH HTN, schizophrenia, COPD (4L home O2), and ESRD on HD (MW) presenting to the ED with nausea, vomiting and diarrhea admitted for acute pancreatitis. RUQ with findings of cholecystitis now POD #1      Assessment:  #pancreatitis  #Cholecystitis s/p cholecystectomy POD#1  #constipation  #preoperative evaluation  #hyponatremia  #ESRD  #chronic hypoxic respiratory failure  #HTn  #HLD  #COPD  #schizophrenia  # macrocytic anemia  #morbid obesity    Plan:  -dc iv flagyl/cipro- can do oral x 3 more days  - hyponatremia multifactorial in setting of psych meds, pain, ESRD with missed dialysis sessions, vomiting and dehydration  diet advanced today- will f/u  - HD on MW as schedule, Dr. La consulted, input appreciated- planned for HD today- follow up sodium post hemodialysis  -c/w hydralazine 100mg tid, can increase norvasc if BP remains uncontrolled (or alternatively, switch to nifidipine in an ESRD patient)  - continue BP meds, monitor BP  - Pain medication as needed.   - O2 oxygen as needed, continue home medications  - appreciate surgical intervention/management  - continue psych home meds  - off DVT ppx in s

## 2024-01-31 NOTE — PROGRESS NOTE ADULT - PROBLEM SELECTOR PLAN 3
h/o ESRD on HD MWF  follows Dr. La, will consult to resume HD  AV Fistula in RUE with palpable thrill  monitor fluid status while pt is receiving fluids h/o ESRD on HD MWF via Rt AVF   follows Dr. La - plan HD today

## 2024-01-31 NOTE — PROGRESS NOTE ADULT - PROBLEM SELECTOR PLAN 4
h/o HTN  takes coreg, hydralazine, lasix and amlodipine  will stop lasix as pt no longer makes urine with ESRD  will resume coreg, hydralazine and amlodipine with parameters  monitor BP likely  multifactorial   pt is on chronic  psych meds, pain and ESRD who missed dialysis prior to admission   also presented with vomiting and dehydration due to pancreatitis and cholecystitis   monitor BMP  asymptomatic

## 2024-01-31 NOTE — PROGRESS NOTE ADULT - PROBLEM SELECTOR PLAN 6
h/o COPD on 4L O2 at home  saturating 99% on 3L in ED  sat target 88-92%   continue home albuterol, Spiriva, and symbicort h/o HTN  takes coreg, hydralazine, lasix and amlodipine  dced lasix as pt no longer makes urine with ESRD  currently on coreg, hydralazine and amlodipine with parameters  monitor BP  BP controlled well

## 2024-01-31 NOTE — PROGRESS NOTE ADULT - ASSESSMENT
# ESRD.  HD MWF. HD today.  # acute pancreatitis. S/P robotic  lap javier.  # anemia of CKD epogen on hemodialysis  # RENAL OSTEODYSTROPHY. PHOS IS HIGH. CONT RENVELA.  # HTN. on Norvasc and hydralazine.

## 2024-01-31 NOTE — PROGRESS NOTE ADULT - ASSESSMENT
43M PMH HTN, schizophrenia, COPD (4L home O2), and ESRD on HD (MWF) presenting to the ED with nausea, vomting and diarrhea admitted for acute pancreatitis. RUQ with findings of cholecystitis. Pt evaluated by surgery, plan for OR next week.   43M PMH HTN, schizophrenia, COPD (4L home O2), and ESRD on HD (MWF via Rt lower AVF ) presenting to the ED with nausea/ vomiting and diarrhea. Admitted for acute pancreatitis. RUQ with findings of cholecystitis. s/p Robot Lap javier POD #1, tolerating liquids well, will advanced diet to regular. No c.o pain voiced, IV pain meds titrated down to P.O OOB to chair today, monitor MAURIZIO drainage output

## 2024-01-31 NOTE — CONSULT NOTE ADULT - PROBLEM SELECTOR RECOMMENDATION 9
Pt with acute postop abdominal pain which is somatic in nature due to s/p Robot-assisted cholecystectomy on 1/30 POD #1. Pt also reports lumbar back pain which is somatic in nature exacerbated by laying in bed and morbid obesity.   Opioid pain recommendations   - Avoid IV opioids.   - Continue dilaudid 2mg PO q 4 hours PRN severe pain. Monitor for sedation/ respiratory depression.   Non-opioid pain recommendations   - Acetaminophen 1 gram PO q 8 hours PRN moderate pain.   - Flexeril 5mg PO TID x 4 days.   Bowel Regimen  - Continue Miralax 17G PO daily  - Continue Senna 2 tablets at bedtime for constipation  Mild pain (score 1-3)  - Non-pharmacological pain treatment recommendations  - Warm/ Cool packs PRN   - Repositioning, imagery, relaxation, distraction.  - Physical therapy OOB if no contraindications   Recommendations discussed with primary team and RN. Upon discharge recommend to continue current regimen. Send with bowel regimen and narcan rescue kit.

## 2024-01-31 NOTE — CHART NOTE - NSCHARTNOTEFT_GEN_A_CORE
Pt POD 00 s/p Robot-assisted cholecystectomy     Vital Signs Last 24 Hrs  T(C): 36.7 (31 Jan 2024 00:25), Max: 37.1 (30 Jan 2024 22:01)  T(F): 98.1 (31 Jan 2024 00:25), Max: 98.8 (30 Jan 2024 22:01)  HR: 71 (31 Jan 2024 00:25) (66 - 85)  BP: 128/66 (31 Jan 2024 00:25) (106/55 - 148/69)  BP(mean): 97 (30 Jan 2024 21:16) (8 - 97)  RR: 17 (31 Jan 2024 00:25) (12 - 26)  SpO2: 95% (31 Jan 2024 00:25) (94% - 100%)    Parameters below as of 31 Jan 2024 00:25  Patient On (Oxygen Delivery Method): nasal cannula  O2 Flow (L/min): 4 Pt POD 00 s/p Robot-assisted cholecystectomy   resting comfortably  no n/v  MAURIZIO ss 155cc    Vital Signs Last 24 Hrs  T(C): 36.7 (31 Jan 2024 00:25), Max: 37.1 (30 Jan 2024 22:01)  T(F): 98.1 (31 Jan 2024 00:25), Max: 98.8 (30 Jan 2024 22:01)  HR: 71 (31 Jan 2024 00:25) (66 - 85)  BP: 128/66 (31 Jan 2024 00:25) (106/55 - 148/69)  BP(mean): 97 (30 Jan 2024 21:16) (8 - 97)  RR: 17 (31 Jan 2024 00:25) (12 - 26)  SpO2: 95% (31 Jan 2024 00:25) (94% - 100%)    Parameters below as of 31 Jan 2024 00:25  Patient On (Oxygen Delivery Method): nasal cannula  O2 Flow (L/min): 4    abd soft, inc RAQUEL, MAURIZIO ss    stable post-op

## 2024-02-01 ENCOUNTER — TRANSCRIPTION ENCOUNTER (OUTPATIENT)
Age: 44
End: 2024-02-01

## 2024-02-01 ENCOUNTER — APPOINTMENT (OUTPATIENT)
Dept: RADIOLOGY | Facility: HOSPITAL | Age: 44
End: 2024-02-01

## 2024-02-01 LAB
ANION GAP SERPL CALC-SCNC: 6 MMOL/L — SIGNIFICANT CHANGE UP (ref 5–17)
BUN SERPL-MCNC: 45 MG/DL — HIGH (ref 7–18)
CALCIUM SERPL-MCNC: 9.6 MG/DL — SIGNIFICANT CHANGE UP (ref 8.4–10.5)
CHLORIDE SERPL-SCNC: 93 MMOL/L — LOW (ref 96–108)
CO2 SERPL-SCNC: 27 MMOL/L — SIGNIFICANT CHANGE UP (ref 22–31)
CREAT SERPL-MCNC: 9.46 MG/DL — HIGH (ref 0.5–1.3)
EGFR: 6 ML/MIN/1.73M2 — LOW
GLUCOSE SERPL-MCNC: 117 MG/DL — HIGH (ref 70–99)
HCT VFR BLD CALC: 27.7 % — LOW (ref 39–50)
HGB BLD-MCNC: 8.8 G/DL — LOW (ref 13–17)
MAGNESIUM SERPL-MCNC: 2.2 MG/DL — SIGNIFICANT CHANGE UP (ref 1.6–2.6)
MCHC RBC-ENTMCNC: 31.8 GM/DL — LOW (ref 32–36)
MCHC RBC-ENTMCNC: 33.5 PG — SIGNIFICANT CHANGE UP (ref 27–34)
MCV RBC AUTO: 105.3 FL — HIGH (ref 80–100)
NRBC # BLD: 0 /100 WBCS — SIGNIFICANT CHANGE UP (ref 0–0)
PHOSPHATE SERPL-MCNC: 7 MG/DL — HIGH (ref 2.5–4.5)
PLATELET # BLD AUTO: 166 K/UL — SIGNIFICANT CHANGE UP (ref 150–400)
POTASSIUM SERPL-MCNC: 4.9 MMOL/L — SIGNIFICANT CHANGE UP (ref 3.5–5.3)
POTASSIUM SERPL-SCNC: 4.9 MMOL/L — SIGNIFICANT CHANGE UP (ref 3.5–5.3)
RBC # BLD: 2.63 M/UL — LOW (ref 4.2–5.8)
RBC # FLD: 13.9 % — SIGNIFICANT CHANGE UP (ref 10.3–14.5)
SODIUM SERPL-SCNC: 126 MMOL/L — LOW (ref 135–145)
WBC # BLD: 6.74 K/UL — SIGNIFICANT CHANGE UP (ref 3.8–10.5)
WBC # FLD AUTO: 6.74 K/UL — SIGNIFICANT CHANGE UP (ref 3.8–10.5)

## 2024-02-01 PROCEDURE — 99232 SBSQ HOSP IP/OBS MODERATE 35: CPT

## 2024-02-01 PROCEDURE — 99233 SBSQ HOSP IP/OBS HIGH 50: CPT

## 2024-02-01 RX ORDER — METRONIDAZOLE 500 MG
500 TABLET ORAL EVERY 8 HOURS
Refills: 0 | Status: DISCONTINUED | OUTPATIENT
Start: 2024-02-01 | End: 2024-02-02

## 2024-02-01 RX ORDER — CIPROFLOXACIN LACTATE 400MG/40ML
1 VIAL (ML) INTRAVENOUS
Qty: 6 | Refills: 0
Start: 2024-02-01 | End: 2024-02-03

## 2024-02-01 RX ORDER — METRONIDAZOLE 500 MG
1 TABLET ORAL
Qty: 9 | Refills: 0
Start: 2024-02-01 | End: 2024-02-03

## 2024-02-01 RX ORDER — POLYETHYLENE GLYCOL 3350 17 G/17G
17 POWDER, FOR SOLUTION ORAL
Qty: 510 | Refills: 0
Start: 2024-02-01 | End: 2024-03-01

## 2024-02-01 RX ORDER — CIPROFLOXACIN LACTATE 400MG/40ML
500 VIAL (ML) INTRAVENOUS EVERY 12 HOURS
Refills: 0 | Status: DISCONTINUED | OUTPATIENT
Start: 2024-02-01 | End: 2024-02-02

## 2024-02-01 RX ORDER — HYDROMORPHONE HYDROCHLORIDE 2 MG/ML
1 INJECTION INTRAMUSCULAR; INTRAVENOUS; SUBCUTANEOUS
Qty: 12 | Refills: 0
Start: 2024-02-01 | End: 2024-02-03

## 2024-02-01 RX ORDER — CYCLOBENZAPRINE HYDROCHLORIDE 10 MG/1
1 TABLET, FILM COATED ORAL
Qty: 9 | Refills: 0
Start: 2024-02-01 | End: 2024-02-03

## 2024-02-01 RX ORDER — ACETAMINOPHEN 500 MG
2 TABLET ORAL
Qty: 42 | Refills: 0
Start: 2024-02-01 | End: 2024-02-07

## 2024-02-01 RX ORDER — FUROSEMIDE 40 MG
1 TABLET ORAL
Qty: 0 | Refills: 0 | DISCHARGE

## 2024-02-01 RX ORDER — SENNA PLUS 8.6 MG/1
2 TABLET ORAL
Qty: 60 | Refills: 0
Start: 2024-02-01 | End: 2024-03-01

## 2024-02-01 RX ADMIN — CYCLOBENZAPRINE HYDROCHLORIDE 5 MILLIGRAM(S): 10 TABLET, FILM COATED ORAL at 16:06

## 2024-02-01 RX ADMIN — HEPARIN SODIUM 5000 UNIT(S): 5000 INJECTION INTRAVENOUS; SUBCUTANEOUS at 06:01

## 2024-02-01 RX ADMIN — Medication 500 MILLIGRAM(S): at 22:49

## 2024-02-01 RX ADMIN — Medication 25 MILLIGRAM(S): at 18:57

## 2024-02-01 RX ADMIN — CARVEDILOL PHOSPHATE 25 MILLIGRAM(S): 80 CAPSULE, EXTENDED RELEASE ORAL at 18:58

## 2024-02-01 RX ADMIN — Medication 1000 MILLIGRAM(S): at 23:36

## 2024-02-01 RX ADMIN — Medication 25 MILLIGRAM(S): at 06:01

## 2024-02-01 RX ADMIN — HEPARIN SODIUM 5000 UNIT(S): 5000 INJECTION INTRAVENOUS; SUBCUTANEOUS at 22:48

## 2024-02-01 RX ADMIN — RISPERIDONE 2 MILLIGRAM(S): 4 TABLET ORAL at 18:57

## 2024-02-01 RX ADMIN — Medication 500 MILLIGRAM(S): at 16:06

## 2024-02-01 RX ADMIN — CHLORHEXIDINE GLUCONATE 1 APPLICATION(S): 213 SOLUTION TOPICAL at 12:16

## 2024-02-01 RX ADMIN — BUDESONIDE AND FORMOTEROL FUMARATE DIHYDRATE 2 PUFF(S): 160; 4.5 AEROSOL RESPIRATORY (INHALATION) at 22:49

## 2024-02-01 RX ADMIN — CYCLOBENZAPRINE HYDROCHLORIDE 5 MILLIGRAM(S): 10 TABLET, FILM COATED ORAL at 06:01

## 2024-02-01 RX ADMIN — QUETIAPINE FUMARATE 300 MILLIGRAM(S): 200 TABLET, FILM COATED ORAL at 12:16

## 2024-02-01 RX ADMIN — AMLODIPINE BESYLATE 5 MILLIGRAM(S): 2.5 TABLET ORAL at 06:02

## 2024-02-01 RX ADMIN — Medication 100 MILLIGRAM(S): at 16:06

## 2024-02-01 RX ADMIN — Medication 1000 MILLIGRAM(S): at 06:39

## 2024-02-01 RX ADMIN — CARVEDILOL PHOSPHATE 25 MILLIGRAM(S): 80 CAPSULE, EXTENDED RELEASE ORAL at 06:02

## 2024-02-01 RX ADMIN — Medication 100 MILLIGRAM(S): at 12:15

## 2024-02-01 RX ADMIN — SEVELAMER CARBONATE 800 MILLIGRAM(S): 2400 POWDER, FOR SUSPENSION ORAL at 18:58

## 2024-02-01 RX ADMIN — RISPERIDONE 2 MILLIGRAM(S): 4 TABLET ORAL at 06:01

## 2024-02-01 RX ADMIN — SEVELAMER CARBONATE 800 MILLIGRAM(S): 2400 POWDER, FOR SUSPENSION ORAL at 12:10

## 2024-02-01 RX ADMIN — Medication 5 MILLIGRAM(S): at 12:16

## 2024-02-01 RX ADMIN — ATORVASTATIN CALCIUM 40 MILLIGRAM(S): 80 TABLET, FILM COATED ORAL at 22:49

## 2024-02-01 RX ADMIN — SEVELAMER CARBONATE 800 MILLIGRAM(S): 2400 POWDER, FOR SUSPENSION ORAL at 08:40

## 2024-02-01 RX ADMIN — POLYETHYLENE GLYCOL 3350 17 GRAM(S): 17 POWDER, FOR SOLUTION ORAL at 12:14

## 2024-02-01 RX ADMIN — ESCITALOPRAM OXALATE 10 MILLIGRAM(S): 10 TABLET, FILM COATED ORAL at 12:17

## 2024-02-01 RX ADMIN — Medication 100 MILLIGRAM(S): at 22:49

## 2024-02-01 RX ADMIN — CYCLOBENZAPRINE HYDROCHLORIDE 5 MILLIGRAM(S): 10 TABLET, FILM COATED ORAL at 23:06

## 2024-02-01 RX ADMIN — Medication 100 MILLIGRAM(S): at 06:01

## 2024-02-01 RX ADMIN — Medication 1000 MILLIGRAM(S): at 22:49

## 2024-02-01 RX ADMIN — Medication 1000 MILLIGRAM(S): at 16:06

## 2024-02-01 RX ADMIN — HALOPERIDOL DECANOATE 2 MILLIGRAM(S): 100 INJECTION INTRAMUSCULAR at 12:13

## 2024-02-01 RX ADMIN — BUDESONIDE AND FORMOTEROL FUMARATE DIHYDRATE 2 PUFF(S): 160; 4.5 AEROSOL RESPIRATORY (INHALATION) at 12:12

## 2024-02-01 RX ADMIN — Medication 1 ENEMA: at 16:15

## 2024-02-01 RX ADMIN — SENNA PLUS 2 TABLET(S): 8.6 TABLET ORAL at 23:06

## 2024-02-01 RX ADMIN — HEPARIN SODIUM 5000 UNIT(S): 5000 INJECTION INTRAVENOUS; SUBCUTANEOUS at 16:07

## 2024-02-01 RX ADMIN — MIRTAZAPINE 15 MILLIGRAM(S): 45 TABLET, ORALLY DISINTEGRATING ORAL at 12:14

## 2024-02-01 RX ADMIN — Medication 1000 MILLIGRAM(S): at 17:06

## 2024-02-01 RX ADMIN — TIOTROPIUM BROMIDE 2 PUFF(S): 18 CAPSULE ORAL; RESPIRATORY (INHALATION) at 12:17

## 2024-02-01 RX ADMIN — Medication 500 MILLIGRAM(S): at 18:57

## 2024-02-01 RX ADMIN — Medication 1000 MILLIGRAM(S): at 06:02

## 2024-02-01 RX ADMIN — Medication 100 MILLIGRAM(S): at 06:02

## 2024-02-01 RX ADMIN — CALCITRIOL 0.5 MICROGRAM(S): 0.5 CAPSULE ORAL at 12:13

## 2024-02-01 NOTE — PROGRESS NOTE ADULT - ASSESSMENT
43M s/p robo lap cholecystectomy POD#2  VSS, afebrile, postop stable     PLAN   - pt is cleared for d/c from surgical standpoint   - d/c home with MAURIZIO drain   - ween off pain medications, follow pain recommendations   - continue to hold plavix  - encourage ambulation / OOB  - Remainder of care per primary team  - follow up with Dr. Meadows as an outpatient in 1-2 weeks    TEJAS MEADOWS   [address] [place] [address]  Phone: [phone]  Fax: [phone]

## 2024-02-01 NOTE — PROGRESS NOTE ADULT - SUBJECTIVE AND OBJECTIVE BOX
Phone outreach to patient to complete her intake into the [facility] program, left voicemail message w/contact information, I will close out program if I do not hear back from her today, have make numerous attempts to complete intake without success.       Patient is a 43y old  Male who presents with a chief complaint of acute pancreatitis       INTERVAL HPI/OVERNIGHT EVENTS:   + Large bowel movement this evening. Patient expressing concerns about discharge plan safety at time of  by transport--> he called his mother to   engage her in the conversation and she also expressed concerns regarding disposition, as well. A 24 hour notice was provided earlier today. Readdressed with patient that he had been recommended for home PT by   physical therapy with rolling walker already delivered to his home. Transportation services to tomorrow's dialysis sessions as well as general home services were arranged. At this time, both the patient and his mother prefer to not proceed with the discharge this evening.  Discussed with MR. Mace we would have case management touch base with him in the morning, but would likely proceed with discharge again on 2/2/24.    MEDICATIONS  (STANDING):  acetaminophen     Tablet .. 1000 milliGRAM(s) Oral every 8 hours  amLODIPine   Tablet 5 milliGRAM(s) Oral daily  atorvastatin 40 milliGRAM(s) Oral at bedtime  bisacodyl 5 milliGRAM(s) Oral daily  budesonide 160 MICROgram(s)/formoterol 4.5 MICROgram(s) Inhaler 2 Puff(s) Inhalation two times a day  calcitriol   Capsule 0.5 MICROGram(s) Oral daily  carvedilol 25 milliGRAM(s) Oral every 12 hours  chlorhexidine 2% Cloths 1 Application(s) Topical daily  ciprofloxacin     Tablet 500 milliGRAM(s) Oral every 12 hours  cyclobenzaprine 5 milliGRAM(s) Oral three times a day  epoetin cyndie (PROCRIT) Injectable 64854 Unit(s) IV Push <User Schedule>  escitalopram 10 milliGRAM(s) Oral daily  haloperidol     Tablet 2 milliGRAM(s) Oral daily  heparin   Injectable 5000 Unit(s) SubCutaneous every 8 hours  hydrALAZINE 100 milliGRAM(s) Oral three times a day  hydrOXYzine hydrochloride 25 milliGRAM(s) Oral every 12 hours  metroNIDAZOLE    Tablet 500 milliGRAM(s) Oral every 8 hours  mirtazapine 15 milliGRAM(s) Oral daily  mupirocin 2% Ointment 1 Application(s) Both Nostrils two times a day  polyethylene glycol 3350 17 Gram(s) Oral daily  QUEtiapine  milliGRAM(s) Oral daily  risperiDONE   Tablet 2 milliGRAM(s) Oral two times a day  senna 2 Tablet(s) Oral at bedtime  sevelamer carbonate 800 milliGRAM(s) Oral three times a day with meals  tiotropium 2.5 MICROgram(s) Inhaler 2 Puff(s) Inhalation daily  traZODone 100 milliGRAM(s) Oral daily    MEDICATIONS  (PRN):  albuterol    90 MICROgram(s) HFA Inhaler 2 Puff(s) Inhalation every 6 hours PRN Shortness of Breath and/or Wheezing  aluminum hydroxide/magnesium hydroxide/simethicone Suspension 30 milliLiter(s) Oral every 4 hours PRN Dyspepsia  HYDROmorphone   Tablet 2 milliGRAM(s) Oral every 4 hours PRN Severe Pain (7 - 10)  melatonin 3 milliGRAM(s) Oral at bedtime PRN Insomnia  ondansetron Injectable 4 milliGRAM(s) IV Push every 8 hours PRN Nausea and/or Vomiting      __________________________________________________  REVIEW OF SYSTEMS:    CONSTITUTIONAL: No fever, + generalized weakness  EYES: no acute visual disturbances  NECK: No pain or stiffness  RESPIRATORY: No cough; No shortness of breath  CARDIOVASCULAR: No chest pain, no palpitations  GASTROINTESTINAL: No pain. No nausea or vomiting; No diarrhea   NEUROLOGICAL: No headache or numbness, no tremors  MUSCULOSKELETAL: No joint pain, no muscle pain  GENITOURINARY: no dysuria, no frequency, no hesitancy  PSYCHIATRY: no depression , no anxiety  ALL OTHER  ROS negative                ________________________________________________  PHYSICAL EXAM:  GENERAL: AAOX NAD, morbid obesity  HEENT: Normocephalic;  conjunctivae and sclerae clear; moist mucous membranes;   NECK : supple  CHEST/LUNG: Clear to auscultation bilaterally with good air entry   HEART: S1 S2  regular; no murmurs, gallops or rubs  ABDOMEN: (+) MAURIZIO drain, soft, nontender, Nondistended; Bowel sounds present  EXTREMITIES: + RUE fistula thrill and bruit no cyanosis; no edema; no calf tenderness  SKIN: warm and dry; no rash      _________________________________________________  LABS:                        8.8    6.74  )-----------( 166      ( 01 Feb 2024 06:44 )             27.7     02-01    126<L>  |  93<L>  |  45<H>  ----------------------------<  117<H>  4.9   |  27  |  9.46<H>    Ca    9.6      01 Feb 2024 06:44  Phos  7.0     02-01  Mg     2.2     02-01        Urinalysis Basic - ( 01 Feb 2024 06:44 )    Color: x / Appearance: x / SG: x / pH: x  Gluc: 117 mg/dL / Ketone: x  / Bili: x / Urobili: x   Blood: x / Protein: x / Nitrite: x   Leuk Esterase: x / RBC: x / WBC x   Sq Epi: x / Non Sq Epi: x / Bacteria: x      CAPILLARY BLOOD GLUCOSE            RADIOLOGY & ADDITIONAL TESTS:    Imaging Personally Reviewed:  YES/NO    Consultant(s) Notes Reviewed:   YES/ No    Care Discussed with Consultants :     Plan of care was discussed with patient and /or primary care giver; all questions and concerns were addressed and care was aligned with patient's wishes.

## 2024-02-01 NOTE — PROGRESS NOTE ADULT - PROBLEM SELECTOR PLAN 6
h/o HTN  takes coreg, hydralazine, lasix and amlodipine  dced lasix as pt no longer makes urine with ESRD  currently on coreg, hydralazine and amlodipine with parameters  monitor BP  BP controlled well

## 2024-02-01 NOTE — DISCHARGE NOTE PROVIDER - CARE PROVIDER_API CALL
[facility]  [address] [place] [id number]  Phone: [phone]  Fax: [phone]  Follow Up Time: 2 weeks

## 2024-02-01 NOTE — PROGRESS NOTE ADULT - SUBJECTIVE AND OBJECTIVE BOX
Source of information: PRESTON JOHNSON, Chart review  Patient language: English  : n/a    HPI:  43M PMH HTN, schizophrenia, COPD (4L home O2), and ESRD on HD (MWF) presenting to the ED with nausea, vomtiing and diarrhea. Pt states that his symptoms started 1 week ago and he would have nausea and vomiting every time he tries to ingest any food. He has had > 5 episodes of NBNB vomiting and will have nonbloody watery loose stool after each episode. He denies trying any new foods, sick contacts or recent travel. He also reports sharp abdominal pain immediately after he tries to eat, located in the periumbilical and epigastric region, radiating to his chest, intermittent 10/10. Due to his symptoms, he was only able to complete 3 hours of his HD on Monday and missed his HD on Wednesday. He has been having some SOB and generalized swelling due to his missed HD. He denies any fevers, chills, constipation. numbness/tingling/weakness in extremities. He lives at home with his mother and ambulates independently  (25 Jan 2024 06:19)    MRCP demonstrates- Mild peripancreatic edema, compatible with patient's known acute pancreatitis.  The common bile duct measures 3 mm in caliber which is within normal limits. No gross evidence for choledocholithiasis.  Gallstones noted on the recent abdominal ultrasound dated 1/26/2024 are not visualized at MR. Pericholecystic edema. If there is a clinical suspicion for acute cholecystitis, HIDA scan may be pursued for further evaluation.  Splenomegaly.  The liver and spleen appear diffusely dark on T2-weighted images. Possibility of hemochromatosis is considered. Clinical correlation is recommended.    Pt is admitted for acute pancreatitis. S/p Robot-assisted cholecystectomy on 1/30 POD #1.  Pain consulted for abdominal pain. Pt seen and examined at bedside. Reports abdominal pain score 10/10  SCALE USED: (1-10 VNRS). Pt describes pain as constant, sharp, greatest over right upper abdomen, radiating to mid abdomen, not alleviated by PO pain medication, exacerbated by palpation. Pt requesting IV dilaudid, states that IV dilaudid was able to get rid of his pain completely. Pt also reports lumbar back pain, tightness, aching, non-radiating, rating pain score 10/10. Pt tolerating PO renal diet. Denies lethargy, chest pain, SOB, nausea, vomiting, constipation. Reports belching. Denies passing flatus. Educated pt on realistic functional pain goals.  Patient stated goal for pain control: to be able to take deep breaths, get out of bed to chair and ambulate with tolerable pain control. States he was able to ambulate with a walker today. Pt denies taking medications for pain at home. Plan to be discharged possibly 2/1.     PAST MEDICAL & SURGICAL HISTORY:  Morbid obesity with BMI of 40.0-44.9, adult      ESRD on dialysis      Bipolar disorder      DM (diabetes mellitus)      HTN (hypertension)      Migraine      COPD (chronic obstructive pulmonary disease)      Renal failure      Asthma with COPD      Diabetes mellitus, type 2      Hypertension      Schizophrenia      Anxiety      Schizophrenia      COPD (chronic obstructive pulmonary disease)      HTN (hypertension)      ESRD on dialysis      HLD (hyperlipidemia)      H/O hernia repair      S/P arteriovenous (AV) fistula creation  right side          FAMILY HISTORY:  FH: lymphoma (Sibling)    FH: rheumatoid arthritis (Sibling)        Social History:   [X ] Denies ETOH use, illicit drug use and smoking    Allergies    shellfish (Anaphylaxis)  shellfish (Rash)  penicillins (Swelling)  fish (Unknown)  [place] (Anaphylaxis)  penicillin (Anaphylaxis)  Seafood (Hives)  aspirin (Swelling)    MEDICATIONS  (STANDING):  acetaminophen     Tablet .. 1000 milliGRAM(s) Oral every 8 hours  amLODIPine   Tablet 5 milliGRAM(s) Oral daily  atorvastatin 40 milliGRAM(s) Oral at bedtime  budesonide 160 MICROgram(s)/formoterol 4.5 MICROgram(s) Inhaler 2 Puff(s) Inhalation two times a day  calcitriol   Capsule 0.5 MICROGram(s) Oral daily  carvedilol 25 milliGRAM(s) Oral every 12 hours  chlorhexidine 2% Cloths 1 Application(s) Topical daily  ciprofloxacin   IVPB 400 milliGRAM(s) IV Intermittent every 24 hours  cyclobenzaprine 5 milliGRAM(s) Oral three times a day  epoetin cyndie (PROCRIT) Injectable 51354 Unit(s) IV Push <User Schedule>  escitalopram 10 milliGRAM(s) Oral daily  haloperidol     Tablet 2 milliGRAM(s) Oral daily  heparin   Injectable 5000 Unit(s) SubCutaneous every 8 hours  hydrALAZINE 100 milliGRAM(s) Oral three times a day  hydrOXYzine hydrochloride 25 milliGRAM(s) Oral every 12 hours  metroNIDAZOLE  IVPB 500 milliGRAM(s) IV Intermittent every 8 hours  mirtazapine 15 milliGRAM(s) Oral daily  mupirocin 2% Ointment 1 Application(s) Both Nostrils two times a day  polyethylene glycol 3350 17 Gram(s) Oral daily  QUEtiapine  milliGRAM(s) Oral daily  risperiDONE   Tablet 2 milliGRAM(s) Oral two times a day  senna 2 Tablet(s) Oral at bedtime  sevelamer carbonate 800 milliGRAM(s) Oral three times a day with meals  tiotropium 2.5 MICROgram(s) Inhaler 2 Puff(s) Inhalation daily  traZODone 100 milliGRAM(s) Oral daily    MEDICATIONS  (PRN):  albuterol    90 MICROgram(s) HFA Inhaler 2 Puff(s) Inhalation every 6 hours PRN Shortness of Breath and/or Wheezing  aluminum hydroxide/magnesium hydroxide/simethicone Suspension 30 milliLiter(s) Oral every 4 hours PRN Dyspepsia  HYDROmorphone   Tablet 2 milliGRAM(s) Oral every 4 hours PRN Severe Pain (7 - 10)  melatonin 3 milliGRAM(s) Oral at bedtime PRN Insomnia  ondansetron Injectable 4 milliGRAM(s) IV Push every 8 hours PRN Nausea and/or Vomiting      Vital Signs Last 24 Hrs  T(C): 36.8 (31 Jan 2024 13:44), Max: 37.1 (30 Jan 2024 22:01)  T(F): 98.2 (31 Jan 2024 13:44), Max: 98.8 (30 Jan 2024 22:01)  HR: 68 (31 Jan 2024 13:44) (68 - 85)  BP: 127/69 (31 Jan 2024 13:44) (113/83 - 148/69)  BP(mean): 97 (30 Jan 2024 21:16) (8 - 97)  RR: 17 (31 Jan 2024 13:44) (12 - 26)  SpO2: 100% (31 Jan 2024 13:44) (94% - 100%)    Parameters below as of 31 Jan 2024 13:44  Patient On (Oxygen Delivery Method): nasal cannula  O2 Flow (L/min): 4      LABS: Reviewed.                          8.8    6.53  )-----------( 157      ( 31 Jan 2024 06:03 )             27.8     01-31    127<L>  |  92<L>  |  46<H>  ----------------------------<  128<H>  4.7   |  27  |  9.49<H>    Ca    9.9      31 Jan 2024 06:03  Phos  7.6     01-31  Mg     2.2     01-31    TPro  7.8  /  Alb  3.3<L>  /  TBili  0.6  /  DBili  x   /  AST  41<H>  /  ALT  62<H>  /  AlkPhos  89  01-31    PT/INR - ( 30 Jan 2024 06:30 )   PT: 12.6 sec;   INR: 1.11 ratio         PTT - ( 30 Jan 2024 06:30 )  PTT:33.4 sec  LIVER FUNCTIONS - ( 31 Jan 2024 06:03 )  Alb: 3.3 g/dL / Pro: 7.8 g/dL / ALK PHOS: 89 U/L / ALT: 62 U/L DA / AST: 41 U/L / GGT: x           Urinalysis Basic - ( 31 Jan 2024 06:03 )    Color: x / Appearance: x / SG: x / pH: x  Gluc: 128 mg/dL / Ketone: x  / Bili: x / Urobili: x   Blood: x / Protein: x / Nitrite: x   Leuk Esterase: x / RBC: x / WBC x   Sq Epi: x / Non Sq Epi: x / Bacteria: x      CAPILLARY BLOOD GLUCOSE      POCT Blood Glucose.: 116 mg/dL (30 Jan 2024 22:15)  POCT Blood Glucose.: 100 mg/dL (30 Jan 2024 19:13)        Radiology: Reviewed.   < from: MR MRCP No Cont (01.28.24 @ 12:59) >    ACC: [id number] EXAM:  MR MRCP   ORDERED BY: NEREYDA JUAREZ     PROCEDURE DATE:  01/28/2024          INTERPRETATION:  CLINICAL INFORMATION: Acute pancreatitis.    COMPARISON: No prior abdominal MR is available for comparison. Reference   is made with a previous abdominal CT dated 1/25/2024 and abdominal   ultrasound dated 1/26/2024    CONTRAST/COMPLICATIONS:  IV Contrast: NONE  Oral Contrast: NONE  Complications: None reported at time of study completion    PROCEDURE:  MRI of the abdomen was performed. The patient was severely   claustrophobic. The patient refused to continue the examination; only   coronal T2-weighted sequence was acquired.    FINDINGS: The examination is severely limited due to reason above.  LOWER CHEST: Within normal limits.    LIVER: The liver appears diffusely dark on T2-weighted images.  BILE DUCTS: Normal caliber. The common bile duct measures 3 mm in caliber   which is within normal limits. No gross evidence for choledocholithiasis.    GALLBLADDER: Gallstones noted on the recent abdominal ultrasound dated   1/26/2024 are not visualized at MR. Pericholecystic edema is also noted.   If there is a clinical suspicion for acute cholecystitis, HIDA scan may   be pursued for further evaluation.  SPLEEN: Splenomegaly measuring 15.6 cm. The spleen appears diffusely dark   on T2-weighted images.  PANCREAS: Mild peripancreatic edema, compatible with patient's known   acute pancreatitis.  ADRENALS: Within normal limits.  KIDNEYS/URETERS: Atrophic kidneys bilaterally. Multiple small brightly T2   hyperintense lesions in the kidneys bilaterally, representing probable   cysts.    VISUALIZED PORTIONS:  BOWEL: Within normal limits.  PERITONEUM: No ascites.  VESSELS: Within normal limits.  RETROPERITONEUM/LYMPH NODES: No lymphadenopathy.  ABDOMINAL WALL: Within normal limits.  BONES: Unremarkable.    IMPRESSION: Mild peripancreatic edema, compatible with patient's known   acute pancreatitis.    The common bile duct measures 3 mm in caliber which is within normal   limits. No gross evidence for choledocholithiasis.    Gallstones noted on the recent abdominal ultrasound dated 1/26/2024 are   not visualized at MR. Pericholecystic edema. If there is a clinical   suspicion for acute cholecystitis, HIDA scan may be pursued for further   evaluation.    Splenomegaly.    The liver and spleen appear diffusely dark on T2-weighted images.   Possibility of hemochromatosis is considered. Clinical correlation is   recommended.    --- End of Report ---            ERICK BOWERS MD; Attending Radiologist  This document has been electronically signed. Jan 29 2024  9:01AM    < end of copied text >      ORT Score -   Family Hx of substance abuse	Female	      Male  Alcohol 	                                           1                     3  Illegal drugs	                                   2                     3  Rx drugs                                           4 	                  4  Personal Hx of substance abuse		  Alcohol 	                                          3	                  3  Illegal drugs                                     4	                  4  Rx drugs                                            5 	                  5  Age between 16- 45 years	           1                     1  hx preadolescent sexual abuse	   3 	                  0  Psychological disease		  ADD, OCD, bipolar, schizophrenia   2	          2  Depression                                           1 	          1  Total: 3    a score of 3 or lower indicates low risk for opioid abuse		  a score of 4-7 indicates moderate risk for opioid abuse		  a score of 8 or higher indicates high risk for opioid abuse    REVIEW OF SYSTEMS:  CONSTITUTIONAL: No fever or fatigue  HEENT:  No difficulty hearing, no change in vision  NECK: No pain or stiffness  RESPIRATORY: hx COPD. asthma No cough, wheezing, chills or hemoptysis; No shortness of breath  CARDIOVASCULAR: No chest pain, palpitations, dizziness, or leg swelling  GASTROINTESTINAL: No loss of appetite, decreased PO intake. + abdominal pain. No nausea, vomiting; No diarrhea or constipation.   GENITOURINARY: No dysuria, frequency, hematuria, retention or incontinence  + hx ESRD on HD  MUSCULOSKELETAL: + lumbar back joint pain; No joint swelling; no upper or lower motor strength weakness, no saddle anesthesia, bowel/bladder incontinence, no falls   NEURO: No headaches, No numbness/tingling b/l LE, No weakness  PSYCHIATRIC: + hx schizophrenia anxiety    PHYSICAL EXAM:  GENERAL:  Alert & Oriented X4, cooperative, NAD, Good concentration. Speech is clear.   RESPIRATORY: Respirations even and unlabored. Diminished to auscultation bilaterally; No rales, rhonchi, wheezing, or rubs + O2 2L NC  CARDIOVASCULAR: Normal S1/S2, regular rate and rhythm; No murmurs, rubs, or gallops. No JVD. + left AVF  GASTROINTESTINAL: + morbid obesity per BMI, Soft, + right upper quadrant tenderness, Nondistended; Bowel sounds present, hypoactive. + right MAURIZIO drain draining serosanguinous fluid; + midline abdominal dressing c/d/ i   PERIPHERAL VASCULAR:  Extremities warm without edema. 2+ Peripheral Pulses, No cyanosis, No calf tenderness  MUSCULOSKELETAL: Motor Strength 5/5 B/L upper and lower extremities; moves all extremities equally against gravity; ROM intact; negative SLR; + lumbar back tenderness on palpation   SKIN: Warm, dry, intact. No rashes, lesions, scars or wounds.     Risk factors associated with adverse outcomes related to opioid treatment  [ ]  Concurrent benzodiazepine use  [ ]  History/ Active substance use or alcohol use disorder  [X ] Psychiatric co-morbidity  [ ] Sleep apnea  [ X] COPD  [ ] BMI> 35  [ ] Liver dysfunction  [X ] Renal dysfunction  [ ] CHF  [ ] Smoker  [ ]  Age > 60 years    [X ]  [place]  Reviewed and Copied to Chart. See below.    Plan of care and goal oriented pain management treatment options were discussed with patient and /or primary care giver; all questions and concerns were addressed and care was aligned with patient's wishes.    Educated patient on goal oriented pain management treatment options        Source of information: PRESTON JOHNSON, Chart review  Patient language: English  : n/a    HPI:  43M PMH HTN, schizophrenia, COPD (4L home O2), and ESRD on HD (MWF) presenting to the ED with nausea, vomtiing and diarrhea. Pt states that his symptoms started 1 week ago and he would have nausea and vomiting every time he tries to ingest any food. He has had > 5 episodes of NBNB vomiting and will have nonbloody watery loose stool after each episode. He denies trying any new foods, sick contacts or recent travel. He also reports sharp abdominal pain immediately after he tries to eat, located in the periumbilical and epigastric region, radiating to his chest, intermittent 10/10. Due to his symptoms, he was only able to complete 3 hours of his HD on Monday and missed his HD on Wednesday. He has been having some SOB and generalized swelling due to his missed HD. He denies any fevers, chills, constipation. numbness/tingling/weakness in extremities. He lives at home with his mother and ambulates independently  (25 Jan 2024 06:19)    MRCP demonstrates- Mild peripancreatic edema, compatible with patient's known acute pancreatitis.  The common bile duct measures 3 mm in caliber which is within normal limits. No gross evidence for choledocholithiasis.  Gallstones noted on the recent abdominal ultrasound dated 1/26/2024 are not visualized at MR. Pericholecystic edema. If there is a clinical suspicion for acute cholecystitis, HIDA scan may be pursued for further evaluation.  Splenomegaly.  The liver and spleen appear diffusely dark on T2-weighted images. Possibility of hemochromatosis is considered. Clinical correlation is recommended.    Pt is admitted for acute pancreatitis. S/p Robot-assisted cholecystectomy on 1/30 POD #2. Pain consulted for abdominal pain. Pt seen and examined at bedside. Reports abdominal pain score 8/10 SCALE USED: (1-10 VNRS). Pt describes pain as constant, sharp, greatest over right upper abdomen, radiating to mid abdomen, alleviated by current pain medication, exacerbated by palpation. Pt also reports lumbar back pain, tightness, aching, non-radiating, rating pain score 8/10. Pt tolerating PO renal diet. Denies lethargy, chest pain, SOB, nausea, vomiting, constipation. Reports belching. Denies passing flatus. Educated pt on realistic functional pain goals.  Patient stated goal for pain control: to be able to take deep breaths, get out of bed to chair and ambulate with tolerable pain control. Reports ambulating with a walker. Pt denies taking medications for pain at home. Plan to be discharged today.     PAST MEDICAL & SURGICAL HISTORY:  Morbid obesity with BMI of 40.0-44.9, adult      ESRD on dialysis      Bipolar disorder      DM (diabetes mellitus)      HTN (hypertension)      Migraine      COPD (chronic obstructive pulmonary disease)      Renal failure      Asthma with COPD      Diabetes mellitus, type 2      Hypertension      Schizophrenia      Anxiety      Schizophrenia      COPD (chronic obstructive pulmonary disease)      HTN (hypertension)      ESRD on dialysis      HLD (hyperlipidemia)      H/O hernia repair      S/P arteriovenous (AV) fistula creation  right side          FAMILY HISTORY:  FH: lymphoma (Sibling)    FH: rheumatoid arthritis (Sibling)        Social History:   [X ] Denies ETOH use, illicit drug use and smoking    Allergies    shellfish (Anaphylaxis)  shellfish (Rash)  penicillins (Swelling)  fish (Unknown)  [place] (Anaphylaxis)  penicillin (Anaphylaxis)  Seafood (Hives)  aspirin (Swelling)    MEDICATIONS  (STANDING):  acetaminophen     Tablet .. 1000 milliGRAM(s) Oral every 8 hours  amLODIPine   Tablet 5 milliGRAM(s) Oral daily  atorvastatin 40 milliGRAM(s) Oral at bedtime  bisacodyl 5 milliGRAM(s) Oral daily  budesonide 160 MICROgram(s)/formoterol 4.5 MICROgram(s) Inhaler 2 Puff(s) Inhalation two times a day  calcitriol   Capsule 0.5 MICROGram(s) Oral daily  carvedilol 25 milliGRAM(s) Oral every 12 hours  chlorhexidine 2% Cloths 1 Application(s) Topical daily  ciprofloxacin     Tablet 500 milliGRAM(s) Oral every 12 hours  cyclobenzaprine 5 milliGRAM(s) Oral three times a day  epoetin cyndie (PROCRIT) Injectable 09608 Unit(s) IV Push <User Schedule>  escitalopram 10 milliGRAM(s) Oral daily  haloperidol     Tablet 2 milliGRAM(s) Oral daily  heparin   Injectable 5000 Unit(s) SubCutaneous every 8 hours  hydrALAZINE 100 milliGRAM(s) Oral three times a day  hydrOXYzine hydrochloride 25 milliGRAM(s) Oral every 12 hours  metroNIDAZOLE    Tablet 500 milliGRAM(s) Oral every 8 hours  mirtazapine 15 milliGRAM(s) Oral daily  mupirocin 2% Ointment 1 Application(s) Both Nostrils two times a day  polyethylene glycol 3350 17 Gram(s) Oral daily  QUEtiapine  milliGRAM(s) Oral daily  risperiDONE   Tablet 2 milliGRAM(s) Oral two times a day  senna 2 Tablet(s) Oral at bedtime  sevelamer carbonate 800 milliGRAM(s) Oral three times a day with meals  tiotropium 2.5 MICROgram(s) Inhaler 2 Puff(s) Inhalation daily  traZODone 100 milliGRAM(s) Oral daily    MEDICATIONS  (PRN):  albuterol    90 MICROgram(s) HFA Inhaler 2 Puff(s) Inhalation every 6 hours PRN Shortness of Breath and/or Wheezing  aluminum hydroxide/magnesium hydroxide/simethicone Suspension 30 milliLiter(s) Oral every 4 hours PRN Dyspepsia  HYDROmorphone   Tablet 2 milliGRAM(s) Oral every 4 hours PRN Severe Pain (7 - 10)  melatonin 3 milliGRAM(s) Oral at bedtime PRN Insomnia  ondansetron Injectable 4 milliGRAM(s) IV Push every 8 hours PRN Nausea and/or Vomiting      Vital Signs Last 24 Hrs  T(C): 36.5 (01 Feb 2024 05:00), Max: 36.8 (31 Jan 2024 13:44)  T(F): 97.7 (01 Feb 2024 05:00), Max: 98.2 (31 Jan 2024 13:44)  HR: 76 (01 Feb 2024 05:00) (68 - 76)  BP: 137/64 (01 Feb 2024 05:00) (116/64 - 162/84)  BP(mean): 92 (31 Jan 2024 20:18) (92 - 92)  RR: 18 (01 Feb 2024 05:00) (17 - 18)  SpO2: 99% (01 Feb 2024 05:00) (99% - 100%)    Parameters below as of 01 Feb 2024 05:00  Patient On (Oxygen Delivery Method): nasal cannula  O2 Flow (L/min): 4      LABS: Reviewed                          8.8    6.74  )-----------( 166      ( 01 Feb 2024 06:44 )             27.7     02-01    126<L>  |  93<L>  |  45<H>  ----------------------------<  117<H>  4.9   |  27  |  9.46<H>    Ca    9.6      01 Feb 2024 06:44  Phos  7.0     02-01  Mg     2.2     02-01    TPro  7.8  /  Alb  3.3<L>  /  TBili  0.6  /  DBili  x   /  AST  41<H>  /  ALT  62<H>  /  AlkPhos  89  01-31      LIVER FUNCTIONS - ( 31 Jan 2024 06:03 )  Alb: 3.3 g/dL / Pro: 7.8 g/dL / ALK PHOS: 89 U/L / ALT: 62 U/L DA / AST: 41 U/L / GGT: x           Urinalysis Basic - ( 01 Feb 2024 06:44 )    Color: x / Appearance: x / SG: x / pH: x  Gluc: 117 mg/dL / Ketone: x  / Bili: x / Urobili: x   Blood: x / Protein: x / Nitrite: x   Leuk Esterase: x / RBC: x / WBC x   Sq Epi: x / Non Sq Epi: x / Bacteria: x    Radiology: Reviewed.   < from: MR MRCP No Cont (01.28.24 @ 12:59) >    ACC: [id number] EXAM:  MR MRCP   ORDERED BY: NEREYDA JUAREZ     PROCEDURE DATE:  01/28/2024          INTERPRETATION:  CLINICAL INFORMATION: Acute pancreatitis.    COMPARISON: No prior abdominal MR is available for comparison. Reference   is made with a previous abdominal CT dated 1/25/2024 and abdominal   ultrasound dated 1/26/2024    CONTRAST/COMPLICATIONS:  IV Contrast: NONE  Oral Contrast: NONE  Complications: None reported at time of study completion    PROCEDURE:  MRI of the abdomen was performed. The patient was severely   claustrophobic. The patient refused to continue the examination; only   coronal T2-weighted sequence was acquired.    FINDINGS: The examination is severely limited due to reason above.  LOWER CHEST: Within normal limits.    LIVER: The liver appears diffusely dark on T2-weighted images.  BILE DUCTS: Normal caliber. The common bile duct measures 3 mm in caliber   which is within normal limits. No gross evidence for choledocholithiasis.    GALLBLADDER: Gallstones noted on the recent abdominal ultrasound dated   1/26/2024 are not visualized at MR. Pericholecystic edema is also noted.   If there is a clinical suspicion for acute cholecystitis, HIDA scan may   be pursued for further evaluation.  SPLEEN: Splenomegaly measuring 15.6 cm. The spleen appears diffusely dark   on T2-weighted images.  PANCREAS: Mild peripancreatic edema, compatible with patient's known   acute pancreatitis.  ADRENALS: Within normal limits.  KIDNEYS/URETERS: Atrophic kidneys bilaterally. Multiple small brightly T2   hyperintense lesions in the kidneys bilaterally, representing probable   cysts.    VISUALIZED PORTIONS:  BOWEL: Within normal limits.  PERITONEUM: No ascites.  VESSELS: Within normal limits.  RETROPERITONEUM/LYMPH NODES: No lymphadenopathy.  ABDOMINAL WALL: Within normal limits.  BONES: Unremarkable.    IMPRESSION: Mild peripancreatic edema, compatible with patient's known   acute pancreatitis.    The common bile duct measures 3 mm in caliber which is within normal   limits. No gross evidence for choledocholithiasis.    Gallstones noted on the recent abdominal ultrasound dated 1/26/2024 are   not visualized at MR. Pericholecystic edema. If there is a clinical   suspicion for acute cholecystitis, HIDA scan may be pursued for further   evaluation.    Splenomegaly.    The liver and spleen appear diffusely dark on T2-weighted images.   Possibility of hemochromatosis is considered. Clinical correlation is   recommended.    --- End of Report ---            ERICK BOWERS MD; Attending Radiologist  This document has been electronically signed. Jan 29 2024  9:01AM    < end of copied text >      ORT Score -   Family Hx of substance abuse	Female	      Male  Alcohol 	                                           1                     3  Illegal drugs	                                   2                     3  Rx drugs                                           4 	                  4  Personal Hx of substance abuse		  Alcohol 	                                          3	                  3  Illegal drugs                                     4	                  4  Rx drugs                                            5 	                  5  Age between 16- 45 years	           1                     1  hx preadolescent sexual abuse	   3 	                  0  Psychological disease		  ADD, OCD, bipolar, schizophrenia   2	          2  Depression                                           1 	          1  Total: 3    a score of 3 or lower indicates low risk for opioid abuse		  a score of 4-7 indicates moderate risk for opioid abuse		  a score of 8 or higher indicates high risk for opioid abuse    REVIEW OF SYSTEMS:  CONSTITUTIONAL: No fever or fatigue  HEENT:  No difficulty hearing, no change in vision  NECK: No pain or stiffness  RESPIRATORY: hx COPD. asthma No cough, wheezing, chills or hemoptysis; No shortness of breath  CARDIOVASCULAR: No chest pain, palpitations, dizziness, or leg swelling  GASTROINTESTINAL: No loss of appetite, decreased PO intake. + abdominal pain. No nausea, vomiting; No diarrhea or constipation.   GENITOURINARY: No dysuria, frequency, hematuria, retention or incontinence  + hx ESRD on HD  MUSCULOSKELETAL: + lumbar back joint pain; No joint swelling; no upper or lower motor strength weakness, no saddle anesthesia, bowel/bladder incontinence, no falls   NEURO: No headaches, No numbness/tingling b/l LE, No weakness  PSYCHIATRIC: + hx schizophrenia anxiety    PHYSICAL EXAM:  GENERAL:  Alert & Oriented X4, cooperative, NAD, Good concentration. Speech is clear.   RESPIRATORY: Respirations even and unlabored. Diminished to auscultation bilaterally; No rales, rhonchi, wheezing, or rubs  CARDIOVASCULAR: Normal S1/S2, regular rate and rhythm; No murmurs, rubs, or gallops. No JVD. + left AVF  GASTROINTESTINAL: + morbid obesity per BMI, Soft, + right upper quadrant tenderness, Nondistended; Bowel sounds present, hypoactive. + right MAURIZIO drain draining serosanguinous fluid; + midline abdominal dressing c/d/ i   PERIPHERAL VASCULAR:  Extremities warm without edema. 2+ Peripheral Pulses, No cyanosis, No calf tenderness  MUSCULOSKELETAL: Motor Strength 5/5 B/L upper and lower extremities; moves all extremities equally against gravity; ROM intact; negative SLR; + lumbar back tenderness on palpation   SKIN: Warm, dry, intact. No rashes, lesions, scars or wounds.     Risk factors associated with adverse outcomes related to opioid treatment  [ ]  Concurrent benzodiazepine use  [ ]  History/ Active substance use or alcohol use disorder  [X ] Psychiatric co-morbidity  [ ] Sleep apnea  [ X] COPD  [ ] BMI> 35  [ ] Liver dysfunction  [X ] Renal dysfunction  [ ] CHF  [ ] Smoker  [ ]  Age > 60 years    [X ]  [place]  Reviewed and Copied to Chart. See below.    Plan of care and goal oriented pain management treatment options were discussed with patient and /or primary care giver; all questions and concerns were addressed and care was aligned with patient's wishes.    Educated patient on goal oriented pain management treatment options     02-01-24 @ 11:20

## 2024-02-01 NOTE — PROGRESS NOTE ADULT - PROBLEM SELECTOR PLAN 5
see dietitian evaluation   renal diet with fluids restriction  continue nephro supplement  encourage ambulation   continue to   life style modifications

## 2024-02-01 NOTE — PROGRESS NOTE ADULT - PROBLEM SELECTOR PLAN 10
s/p robot lap javier 1/30   advanced diet today to regular  MAURIZIO drain monitor   pain control appropriate  dc 2/2/24- will need bariatric stretcher

## 2024-02-01 NOTE — PHYSICAL THERAPY INITIAL EVALUATION ADULT - DIAGNOSIS, PT EVAL
s/p robot assisted cholecystectomy POD#2; abdominal pain with movement; generalized weakness and deconditioning; mild, unsteady gait and transfers

## 2024-02-01 NOTE — PHYSICAL THERAPY INITIAL EVALUATION ADULT - GENERAL OBSERVATIONS, REHAB EVAL
awake, alert, NAD; + surgical incision and MAURIZIO drain on right lower abdominal quadrant; +peripheral IV access on left forearm; AV fistula on right upper extremity; old surgical scar on left upper arm awake, alert, NAD; on O2@4L/min via NC + surgical incision and MAURIZIO drain on right lower abdominal quadrant; +peripheral IV access on left forearm; AV fistula on right upper extremity; old surgical scar on left upper arm

## 2024-02-01 NOTE — PROGRESS NOTE ADULT - PROBLEM SELECTOR PLAN 1
Pt with acute postop abdominal pain which is somatic in nature due to s/p Robot-assisted cholecystectomy on 1/30 POD #2. Pt also reports lumbar back pain which is somatic in nature exacerbated by laying in bed and morbid obesity.   Opioid pain recommendations   - Avoid IV opioids.   - Continue dilaudid 2mg PO q 4 hours PRN severe pain. Monitor for sedation/ respiratory depression.   Non-opioid pain recommendations   - Continue Acetaminophen 1 gram PO q 8 hours PRN moderate pain.   - Continue Flexeril 5mg PO TID x 4 days.   Bowel Regimen  - Continue Miralax 17G PO daily  - Continue Senna 2 tablets at bedtime for constipation  Mild pain (score 1-3)  - Non-pharmacological pain treatment recommendations  - Warm/ Cool packs PRN   - Repositioning, imagery, relaxation, distraction.  - Physical therapy OOB if no contraindications   Recommendations discussed with primary team and RN. Upon discharge recommend to continue current regimen. Send with bowel regimen and narcan rescue kit. Telepain referral sent 2/1.

## 2024-02-01 NOTE — PROGRESS NOTE ADULT - PROBLEM SELECTOR PLAN 4
likely  multifactorial   pt is on chronic  psych meds, pain and ESRD who missed dialysis prior to admission   also presented with vomiting and dehydration due to pancreatitis and cholecystitis   monitor BMP  asymptomatic

## 2024-02-01 NOTE — PROGRESS NOTE ADULT - PROBLEM SELECTOR PLAN 2
status post cholecystectomy  to complete a total of 5 days of oral ciprofloxacin/flagyl on discharge  discharge with MAURIZIO drain in place  appreciate surgery management/input

## 2024-02-01 NOTE — CHART NOTE - NSCHARTNOTEFT_GEN_A_CORE
patient seen and examined at the bedside  pt is stable for discharge home PT recommends home PT with bariatric rolling walker.   Surgery cleared him dc home with MAURIZIO drain and check the output until f/u with Dr. Bueno as outpatient and if needed dressing can be changed with dry dressing with tape  CM will set up home PT and home care for the patient  Pain regimen discussed with pain team and updated in discharge   Pt reports constipation, he has been on clears or NPO until yesterday. MRI on 1/28/24 with no stool burden or constipation  miralax and dulco were given this afternoon, report no effect, encouraged him to use toilet which pt declined due to pain.   Fleet enema was given round 430pm today, will monitor result  transportation set up by   if pt refuses to dc home today, will give 24 hours notice and call Dr. La in AM for HD schedule tomorrow.   Discussed the above with Dr. Mcelroy

## 2024-02-01 NOTE — PHYSICAL THERAPY INITIAL EVALUATION ADULT - ADDITIONAL COMMENTS
patient states that he occasionally uses a rollator patient states that he occasionally uses a rollator; on O2@4L/min via NC at home

## 2024-02-01 NOTE — PROGRESS NOTE ADULT - SUBJECTIVE AND OBJECTIVE BOX
INTERVAL HPI/OVERNIGHT EVENTS:  Pt resting comfortably. No acute complaints. Pt is lethargic this AM. Able to respond to questioning intermittently. Received narcotics shortly before interview. Denies abd pain. Denies nausea, vomiting, fever, chills.     MEDICATIONS  (STANDING):  acetaminophen     Tablet .. 1000 milliGRAM(s) Oral every 8 hours  amLODIPine   Tablet 5 milliGRAM(s) Oral daily  atorvastatin 40 milliGRAM(s) Oral at bedtime  bisacodyl 5 milliGRAM(s) Oral daily  budesonide 160 MICROgram(s)/formoterol 4.5 MICROgram(s) Inhaler 2 Puff(s) Inhalation two times a day  calcitriol   Capsule 0.5 MICROGram(s) Oral daily  carvedilol 25 milliGRAM(s) Oral every 12 hours  chlorhexidine 2% Cloths 1 Application(s) Topical daily  ciprofloxacin     Tablet 500 milliGRAM(s) Oral every 12 hours  cyclobenzaprine 5 milliGRAM(s) Oral three times a day  epoetin cyndie (PROCRIT) Injectable 07361 Unit(s) IV Push <User Schedule>  escitalopram 10 milliGRAM(s) Oral daily  haloperidol     Tablet 2 milliGRAM(s) Oral daily  heparin   Injectable 5000 Unit(s) SubCutaneous every 8 hours  hydrALAZINE 100 milliGRAM(s) Oral three times a day  hydrOXYzine hydrochloride 25 milliGRAM(s) Oral every 12 hours  metroNIDAZOLE    Tablet 500 milliGRAM(s) Oral every 8 hours  mirtazapine 15 milliGRAM(s) Oral daily  mupirocin 2% Ointment 1 Application(s) Both Nostrils two times a day  polyethylene glycol 3350 17 Gram(s) Oral daily  QUEtiapine  milliGRAM(s) Oral daily  risperiDONE   Tablet 2 milliGRAM(s) Oral two times a day  senna 2 Tablet(s) Oral at bedtime  sevelamer carbonate 800 milliGRAM(s) Oral three times a day with meals  tiotropium 2.5 MICROgram(s) Inhaler 2 Puff(s) Inhalation daily  traZODone 100 milliGRAM(s) Oral daily    MEDICATIONS  (PRN):  albuterol    90 MICROgram(s) HFA Inhaler 2 Puff(s) Inhalation every 6 hours PRN Shortness of Breath and/or Wheezing  aluminum hydroxide/magnesium hydroxide/simethicone Suspension 30 milliLiter(s) Oral every 4 hours PRN Dyspepsia  HYDROmorphone   Tablet 2 milliGRAM(s) Oral every 4 hours PRN Severe Pain (7 - 10)  melatonin 3 milliGRAM(s) Oral at bedtime PRN Insomnia  ondansetron Injectable 4 milliGRAM(s) IV Push every 8 hours PRN Nausea and/or Vomiting      Vital Signs Last 24 Hrs  T(C): 36.5 (01 Feb 2024 05:00), Max: 36.8 (31 Jan 2024 13:44)  T(F): 97.7 (01 Feb 2024 05:00), Max: 98.2 (31 Jan 2024 13:44)  HR: 76 (01 Feb 2024 05:00) (68 - 76)  BP: 137/64 (01 Feb 2024 05:00) (116/64 - 162/84)  BP(mean): 92 (31 Jan 2024 20:18) (92 - 92)  RR: 18 (01 Feb 2024 05:00) (17 - 18)  SpO2: 99% (01 Feb 2024 05:00) (99% - 100%)    Parameters below as of 01 Feb 2024 05:00  Patient On (Oxygen Delivery Method): nasal cannula  O2 Flow (L/min): 4      Physical:  General: A&Ox3. NAD.  Resp: Unlabored breathing. Equal chest rise bilaterally.   Abdomen: Soft nondistended, nontender to palpation diffusely. Dressings clean, dry, intact. MAURIZIO with SS output. No voluntary guarding, no rebound tenderness, no palpation masses.   Extremities: No calf tenderness.     I&O's Detail    31 Jan 2024 07:01  -  01 Feb 2024 07:00  --------------------------------------------------------  IN:  Total IN: 0 mL    OUT:    Bulb (mL): 180 mL  Total OUT: 180 mL    Total NET: -180 mL          LABS:                        8.8    6.74  )-----------( 166      ( 01 Feb 2024 06:44 )             27.7             02-01    126<L>  |  93<L>  |  45<H>  ----------------------------<  117<H>  4.9   |  27  |  9.46<H>    Ca    9.6      01 Feb 2024 06:44  Phos  7.0     02-01  Mg     2.2     02-01    TPro  7.8  /  Alb  3.3<L>  /  TBili  0.6  /  DBili  x   /  AST  41<H>  /  ALT  62<H>  /  AlkPhos  89  01-31      43y.o. Male

## 2024-02-01 NOTE — CHART NOTE - NSCHARTNOTESELECT_GEN_ALL_CORE
Event Note
Ext Dwell/Event Note
Internal Medicine/Event Note
Internal Medicine/Event Note
Nephrology/Event Note
Event Note
Nephrology/Event Note

## 2024-02-01 NOTE — DISCHARGE NOTE PROVIDER - NSDCCPCAREPLAN_GEN_ALL_CORE_FT
PRINCIPAL DISCHARGE DIAGNOSIS  Diagnosis: Pancreatitis  Assessment and Plan of Treatment:       SECONDARY DISCHARGE DIAGNOSES  Diagnosis: Cholecystitis  Assessment and Plan of Treatment:     Diagnosis: S/P cholecystectomy  Assessment and Plan of Treatment:     Diagnosis: Hyponatremia  Assessment and Plan of Treatment:     Diagnosis: Morbid obesity with BMI of 40.0-44.9, adult  Assessment and Plan of Treatment:     Diagnosis: ESRD on dialysis  Assessment and Plan of Treatment:      PRINCIPAL DISCHARGE DIAGNOSIS  Diagnosis: Pancreatitis  Assessment and Plan of Treatment: Pancreatitis is a condition which causes severe belly pain, irritated, or swollen Pancreas. The two main causes are gallstones or alcohol abuse. Your case was due to gallstone, cholecystitis. Thus surgery was on your case you underwent robotic lapaloscopic cholecystectomy 1/30/24  follow up with Dr. Bueno at [address] [place] [address] in 1-2 weeks on discharge  MAURIZIO drain - please check daily output of drain and document that and bring that with you when you see Dr. Bueno   Daily dressing -  will set up the home care for you    Continue to take P.O ciprofloxacin and flagyl for 3 more days to complete 10 day course   Follow a low fat diet and avoid Alcohol (Avoid fried foods, desserts, whole milk dairy products, fatty meats)  If you have severe pain which is not managed with pain medications   If you have fever >101 which is not managed will tylenol   then please come back to ED         SECONDARY DISCHARGE DIAGNOSES  Diagnosis: Cholecystitis  Assessment and Plan of Treatment: See plans as above    Diagnosis: S/P cholecystectomy  Assessment and Plan of Treatment: 1/30/2024  see plans as above    Diagnosis: Hyponatremia  Assessment and Plan of Treatment: liekly multifactorial- poor P.O intake due to pain on admission  you are on hemodialysis   and infection   continue to monitor blood level during dailysis    Diagnosis: Morbid obesity with BMI of 40.0-44.9, adult  Assessment and Plan of Treatment: continue exercise and diet monitoring as you mentioned    Diagnosis: ESRD on dialysis  Assessment and Plan of Treatment: last hemodialysis was 1/31/24 resume your dialysis as scheduled     PRINCIPAL DISCHARGE DIAGNOSIS  Diagnosis: Pancreatitis  Assessment and Plan of Treatment: Pancreatitis is a condition which causes severe belly pain, irritated, or swollen Pancreas. The two main causes are gallstones or alcohol abuse. Your case was due to gallstone, cholecystitis. Thus surgery was on your case you underwent robotic lapaloscopic cholecystectomy 1/30/24  follow up with Dr. Bueno at [address] [place] [address] in 1-2 weeks on discharge  MAURIZIO drain - please check daily output of drain and document that and bring that with you when you see Dr. Bueno   Daily dressing -  will set up the home care for you    Continue to take P.O ciprofloxacin and flagyl for 3 more days to complete 10 day course   Follow a low fat diet and avoid Alcohol (Avoid fried foods, desserts, whole milk dairy products, fatty meats)  If you have severe pain which is not managed with pain medications   If you have fever >101 which is not managed will tylenol   then please come back to ED         SECONDARY DISCHARGE DIAGNOSES  Diagnosis: Cholecystitis  Assessment and Plan of Treatment: See plans as above    Diagnosis: S/P cholecystectomy  Assessment and Plan of Treatment: 1/30/2024  see plans as above    Diagnosis: Hyponatremia  Assessment and Plan of Treatment: liekly multifactorial- poor P.O intake due to pain on admission  you are on hemodialysis   and infection   continue to monitor blood level during dailysis    Diagnosis: Morbid obesity with BMI of 40.0-44.9, adult  Assessment and Plan of Treatment: continue exercise and diet monitoring as you mentioned    Diagnosis: ESRD on dialysis  Assessment and Plan of Treatment: last hemodialysis was 1/31/24 resume your dialysis as scheduled    Diagnosis: Constipation  Assessment and Plan of Treatment: due to decreased PO intake till yesterday and you were on pain medications, constipation is the most common side effect from pain medication. continue to take bowel regimen while on pain medications   exercise and walking with physical therapy and increase hours of exercise as you can tolerate it     PRINCIPAL DISCHARGE DIAGNOSIS  Diagnosis: Pancreatitis  Assessment and Plan of Treatment: Pancreatitis is a condition which causes severe belly pain, irritated, or swollen Pancreas. The two main causes are gallstones or alcohol abuse. Your case was due to gallstone, cholecystitis. Thus surgery was on your case you underwent robotic lapaloscopic cholecystectomy 1/30/24  follow up with Dr. Bueno at [address] [place] [address] in 1-2 weeks on discharge  MAURIZIO drain - please check daily output of drain and document that and bring that with you when you see Dr. Bueno   Daily dressing -  will set up the home care for you. You can use dry guaze and tape if site gets dirty     Continue to take P.O ciprofloxacin and flagyl for 2 more days to complete 10 day course   Follow a low fat diet and avoid Alcohol (Avoid fried foods, desserts, whole milk dairy products, fatty meats)  If you have severe pain which is not managed with pain medications   If you have fever >101 which is not managed will tylenol   then please come back to ED         SECONDARY DISCHARGE DIAGNOSES  Diagnosis: Cholecystitis  Assessment and Plan of Treatment: See plans as above    Diagnosis: S/P cholecystectomy  Assessment and Plan of Treatment: 1/30/2024  see plans as above    Diagnosis: Hyponatremia  Assessment and Plan of Treatment: liekly multifactorial- poor P.O intake due to pain on admission  you are on hemodialysis   and infection   continue to monitor blood level during dailysis    Diagnosis: Morbid obesity with BMI of 40.0-44.9, adult  Assessment and Plan of Treatment: continue exercise and diet monitoring as you mentioned    Diagnosis: ESRD on dialysis  Assessment and Plan of Treatment: last hemodialysis was 2/2/24 resume your dialysis as scheduled  You were on PLAVIX in the past due to frequent blood clots issue to your old Left arm fistula. Per Dr. La, your new Fistula has no issue so far thus DO NOT NEED TO TAKE PLAVIX ANYMORE    Diagnosis: Constipation  Assessment and Plan of Treatment: due to decreased PO intake till yesterday and you were on pain medications, constipation is the most common side effect from pain medication. continue to take bowel regimen while on pain medications   exercise and walking with physical therapy and increase hours of exercise as you can tolerate it

## 2024-02-01 NOTE — DISCHARGE NOTE PROVIDER - HOSPITAL COURSE
43M PMH HTN, schizophrenia, COPD (4L home O2), and ESRD on HD (MWF via Rt lower AVF ) presenting to the ED with nausea/ vomiting and diarrhea. Admitted for acute pancreatitis. RUQ with findings of cholecystitis. s/p Robot Lap javier POD #1, tolerating liquids well, will advanced diet to regular. No c.o pain voiced, IV pain meds titrated down to P.O OOB to chair today, monitor MAURIZIO drainage output    43M PMH HTN, schizophrenia, COPD (4L home O2), and ESRD on HD (MWF via Rt lower AVF ) presenting to the ED with nausea/ vomiting and diarrhea. Admitted for acute pancreatitis. RUQ with findings of cholecystitis. s/p Robot Lap javier POD #2, tolerating  regular diet well.  Pain team consulted to manage post op pain. Pt will dc home on P.O pain meds with bowel regimen. Seen by PT, Home PT with bariatric walker. Surgery cleared pt for discharge home with MAURIZIO drain and f/u with Dr. Bueno as outpatient. Pt is medically stable, advised to complete antibiotic for total 10 days.   Discussed with attending, pt is medically optimized for discharge home. Continue HD at his site.   Please refer to daily consult and progress notes for detail hospital course since this is a brief summary.

## 2024-02-01 NOTE — PROGRESS NOTE ADULT - PROBLEM SELECTOR PLAN 1
No gallstones seen on RUQ US. Pain well controlled and now eating regular diet  doing well and medically stable

## 2024-02-01 NOTE — DISCHARGE NOTE PROVIDER - NSDCFUSCHEDAPPT_GEN_ALL_CORE_FT
Luis Alberto Bueno  [facility] Physician [facility]  [facility] [address] [facility]  Scheduled Appointment: 02/08/2024     Luis Alberto Bueno  [facility] Physician [facility]  [facility] [address] [facility]  Scheduled Appointment: 05/23/2024

## 2024-02-01 NOTE — PROGRESS NOTE ADULT - SUBJECTIVE AND OBJECTIVE BOX
[facility] : Progress Note :: [phone], (office [phone]),   Dr La / Dr Hui / Dr Barber / Dr Villalobos / Dr Hang CASTELLANO / Dr Mehta / Dr Sanchez / Dr Alber dumont  _____________________________________________________________________________________________   AVF infiltrated after 1.5 hrs of HD  hyponatremia- drinking lots of fluids.    shellfish (Anaphylaxis)  shellfish (Rash)  penicillins (Swelling)  fish (Unknown)  [place] (Anaphylaxis)  penicillin (Anaphylaxis)  Seafood (Hives)  aspirin (Swelling)    Hospital Medications:   MEDICATIONS  (STANDING):  acetaminophen     Tablet .. 1000 milliGRAM(s) Oral every 8 hours  amLODIPine   Tablet 5 milliGRAM(s) Oral daily  atorvastatin 40 milliGRAM(s) Oral at bedtime  bisacodyl 5 milliGRAM(s) Oral daily  budesonide 160 MICROgram(s)/formoterol 4.5 MICROgram(s) Inhaler 2 Puff(s) Inhalation two times a day  calcitriol   Capsule 0.5 MICROGram(s) Oral daily  carvedilol 25 milliGRAM(s) Oral every 12 hours  chlorhexidine 2% Cloths 1 Application(s) Topical daily  ciprofloxacin     Tablet 500 milliGRAM(s) Oral every 12 hours  cyclobenzaprine 5 milliGRAM(s) Oral three times a day  epoetin cyndie (PROCRIT) Injectable 13897 Unit(s) IV Push <User Schedule>  escitalopram 10 milliGRAM(s) Oral daily  haloperidol     Tablet 2 milliGRAM(s) Oral daily  heparin   Injectable 5000 Unit(s) SubCutaneous every 8 hours  hydrALAZINE 100 milliGRAM(s) Oral three times a day  hydrOXYzine hydrochloride 25 milliGRAM(s) Oral every 12 hours  metroNIDAZOLE    Tablet 500 milliGRAM(s) Oral every 8 hours  mirtazapine 15 milliGRAM(s) Oral daily  mupirocin 2% Ointment 1 Application(s) Both Nostrils two times a day  polyethylene glycol 3350 17 Gram(s) Oral daily  QUEtiapine  milliGRAM(s) Oral daily  risperiDONE   Tablet 2 milliGRAM(s) Oral two times a day  senna 2 Tablet(s) Oral at bedtime  sevelamer carbonate 800 milliGRAM(s) Oral three times a day with meals  tiotropium 2.5 MICROgram(s) Inhaler 2 Puff(s) Inhalation daily  traZODone 100 milliGRAM(s) Oral daily        VITALS:  T(F): 98.5 (02-01-24 @ 14:38), Max: 98.5 (02-01-24 @ 14:38)  HR: 74 (02-01-24 @ 14:38)  BP: 149/73 (02-01-24 @ 14:38)  RR: 18 (02-01-24 @ 14:38)  SpO2: 98% (02-01-24 @ 14:38)  Wt(kg): --    01-31 @ 07:01  -  02-01 @ 07:00  --------------------------------------------------------  IN: 0 mL / OUT: 180 mL / NET: -180 mL    02-01 @ 07:01  -  02-01 @ 14:54  --------------------------------------------------------  IN: 0 mL / OUT: 150 mL / NET: -150 mL        PHYSICAL EXAM:  Constitutional: obese.  HEENT: anicteric sclera, oropharynx clear.  Neck: No JVD  Respiratory: CTAB, no wheezes, rales or rhonchi  Cardiovascular: S1, S2, RRR  Gastrointestinal: BS+, soft, NT/ND  Extremities: No peripheral edema  Neurological: A/O x 3, no focal deficits  Vascular Access: AVF with thrill and bruit.    LABS:  02-01    126<L>  |  93<L>  |  45<H>  ----------------------------<  117<H>  4.9   |  27  |  9.46<H>    Ca    9.6      01 Feb 2024 06:44  Phos  7.0     02-01  Mg     2.2     02-01    TPro  7.8  /  Alb  3.3<L>  /  TBili  0.6  /  DBili      /  AST  41<H>  /  ALT  62<H>  /  AlkPhos  89  01-31    Creatinine Trend: 9.46 <--, 9.49 <--, 7.82 <--, 10.10 <--, 8.84 <--, 7.28 <--, 8.40 <--                        8.8    6.74  )-----------( 166      ( 01 Feb 2024 06:44 )             27.7     Urine Studies:  Urinalysis Basic - ( 01 Feb 2024 06:44 )    Color:  / Appearance:  / SG:  / pH:   Gluc: 117 mg/dL / Ketone:   / Bili:  / Urobili:    Blood:  / Protein:  / Nitrite:    Leuk Esterase:  / RBC:  / WBC    Sq Epi:  / Non Sq Epi:  / Bacteria:         RADIOLOGY & ADDITIONAL STUDIES:

## 2024-02-01 NOTE — DISCHARGE NOTE PROVIDER - DETAILS OF MALNUTRITION DIAGNOSIS/DIAGNOSES
This patient has been assessed with a concern for Malnutrition and was treated during this hospitalization for the following Nutrition diagnosis/diagnoses:     -  01/28/2024: Morbid obesity (BMI > 40)

## 2024-02-01 NOTE — PHYSICAL THERAPY INITIAL EVALUATION ADULT - PERTINENT HX OF CURRENT PROBLEM, REHAB EVAL
s/p Robot-assisted cholecystectomy POD#2; admitted due to acute pancreatitis  PMHx: HTN, schizophrenia, COPD (4L home O2), and ESRD on HD (MWF) presenting to the ED with nausea, vomiting and diarrhea

## 2024-02-01 NOTE — DISCHARGE NOTE NURSING/CASE MANAGEMENT/SOCIAL WORK - NSDCVIVACCINE_GEN_ALL_CORE_FT
influenza, injectable, quadrivalent, preservative free; 07-Apr-2016 12:13; Halle Ellsworth (RN); [employer]; [id number]; IntraMuscular; Deltoid Left.; 0.5 milliLiter(s); VIS (VIS Published: 07-Aug-2015, VIS Presented: 07-Apr-2016);   influenza, injectable, quadrivalent, preservative free; 06-Dec-2018 14:41; Rhonda Kern (RN); [employer]; [id number] (Exp. Date: 30-Jun-2019); IntraMuscular; Deltoid Right.; 0.5 milliLiter(s); VIS (VIS Published: 07-Aug-2015, VIS Presented: 06-Dec-2018);   influenza, injectable, quadrivalent, preservative free; 05-Mar-2019 16:51; Jace Best (RN); [employer]; [id number] (Exp. Date: 30-Jun-2019); IntraMuscular; Deltoid Right.; 0.5 milliLiter(s); VIS (VIS Published: 07-Aug-2015, VIS Presented: 05-Mar-2019);   influenza, injectable, quadrivalent, preservative free; 19-Dec-2020 12:14; Delmer Weinberg (RN); [employer]; [id number] (Exp. Date: 30-Jun-2021); IntraMuscular; Deltoid Right.; 0.5 milliLiter(s); VIS (VIS Published: 15-Aug-2019, VIS Presented: 19-Dec-2020);

## 2024-02-01 NOTE — DISCHARGE NOTE PROVIDER - NSDCMRMEDTOKEN_GEN_ALL_CORE_FT
AMLODIPINE 5MG TAB: 1 tab(s) orally once a day  ATORVASTATIN 40MG TAB: 1 tab(s) orally once a day  budesonide/glycopyrrolate/formoterol 160 mcg-9 mcg-4.8 mcg/inh inhalation aerosol: 2 puff(s) inhaled 2 times a day  Calcitriol Oral Capsule 0.5 MC tab(s) orally once a day  CARVEDILOL 25MG TAB: 1 tab(s) orally 2 times a day  CINACALCET TABLET 30 M tab(s) orally once a day  clopidogrel 75 mg oral tablet: 1 tab(s) orally once a day  ESCITALOPRAM 10MG TAB: 1 tab(s) orally once a day  fluPHENAZine 5 mg oral tablet: 1 tab(s) orally 2 times a day  FUROSEMIDE 80MG TAB: 1 tab(s) orally once a day  Haldol 2 mg oral tablet: 1 tab(s) orally once a day  hydrALAZINE 50 mg oral tablet: 1 tab(s) orally 3 times a day  hydrOXYzine hydrochloride 25 mg oral tablet: 1 tab(s) orally 2 times a day  MIRTAZAPINE 15MG TAB: 1 tab(s) orally once a day  MONTELUKAST 10MG TAB: tab(s) orally once a day  risperiDONE 2 mg oral tablet: 1 tab(s) orally 2 times a day  Seroquel 100 mg oral tablet: 1 tab(s) orally 2 times a day  Seroquel 200 mg oral tablet: 2 tab(s) orally once a day (at bedtime)  SEVELAMER CARB  M each orally 3 times a day  SPIRIVA RESP 1.25ACT INH: 2 puff(s) inhaled 2 times a day  traZODone 150 mg oral tablet: 2 tab(s) orally once a day (at bedtime)  VENTOLIN  INH: 2 puff(s) inhaled 2 times a day   acetaminophen 500 mg oral tablet: 2 tab(s) orally every 8 hours  AMLODIPINE 5MG TAB: 1 tab(s) orally once a day  ATORVASTATIN 40MG TAB: 1 tab(s) orally once a day  bisacodyl 5 mg oral delayed release tablet: 1 tab(s) orally once a day  budesonide/glycopyrrolate/formoterol 160 mcg-9 mcg-4.8 mcg/inh inhalation aerosol: 2 puff(s) inhaled 2 times a day  Calcitriol Oral Capsule 0.5 MC tab(s) orally once a day  CARVEDILOL 25MG TAB: 1 tab(s) orally 2 times a day  CINACALCET TABLET 30 M tab(s) orally once a day  ciprofloxacin 500 mg oral tablet: 1 tab(s) orally every 12 hours  clopidogrel 75 mg oral tablet: 1 tab(s) orally once a day  cyclobenzaprine 5 mg oral tablet: 1 tab(s) orally 3 times a day MDD: 3 tablets  ESCITALOPRAM 10MG TAB: 1 tab(s) orally once a day  fluPHENAZine 5 mg oral tablet: 1 tab(s) orally 2 times a day  Haldol 2 mg oral tablet: 1 tab(s) orally once a day  hydrALAZINE 50 mg oral tablet: 1 tab(s) orally 3 times a day  HYDROmorphone 2 mg oral tablet: 1 tab(s) orally every 6 hours as needed for Severe Pain (7 - 10) MDD: 8mg  hydrOXYzine hydrochloride 25 mg oral tablet: 1 tab(s) orally 2 times a day  metroNIDAZOLE 500 mg oral tablet: 1 tab(s) orally every 8 hours  MIRTAZAPINE 15MG TAB: 1 tab(s) orally once a day  MONTELUKAST 10MG TAB: tab(s) orally once a day  polyethylene glycol 3350 oral powder for reconstitution: 17 gram(s) orally once a day  risperiDONE 2 mg oral tablet: 1 tab(s) orally 2 times a day  senna leaf extract oral tablet: 2 tab(s) orally once a day (at bedtime)  Seroquel 100 mg oral tablet: 1 tab(s) orally 2 times a day  Seroquel 200 mg oral tablet: 2 tab(s) orally once a day (at bedtime)  SEVELAMER CARB  M each orally 3 times a day  SPIRIVA RESP 1.25ACT INH: 2 puff(s) inhaled 2 times a day  traZODone 150 mg oral tablet: 2 tab(s) orally once a day (at bedtime)  VENTOLIN  INH: 2 puff(s) inhaled 2 times a day   acetaminophen 500 mg oral tablet: 2 tab(s) orally every 8 hours  AMLODIPINE 5MG TAB: 1 tab(s) orally once a day  ATORVASTATIN 40MG TAB: 1 tab(s) orally once a day  bisacodyl 5 mg oral delayed release tablet: 1 tab(s) orally once a day  budesonide/glycopyrrolate/formoterol 160 mcg-9 mcg-4.8 mcg/inh inhalation aerosol: 2 puff(s) inhaled 2 times a day  Calcitriol Oral Capsule 0.5 MC tab(s) orally once a day  CARVEDILOL 25MG TAB: 1 tab(s) orally 2 times a day  CINACALCET TABLET 30 M tab(s) orally once a day  ciprofloxacin 500 mg oral tablet: 1 tab(s) orally every 12 hours  cyclobenzaprine 5 mg oral tablet: 1 tab(s) orally 3 times a day MDD: 3 tablets  ESCITALOPRAM 10MG TAB: 1 tab(s) orally once a day  fluPHENAZine 5 mg oral tablet: 1 tab(s) orally 2 times a day  Haldol 2 mg oral tablet: 1 tab(s) orally once a day  hydrALAZINE 50 mg oral tablet: 1 tab(s) orally 3 times a day  HYDROmorphone 2 mg oral tablet: 1 tab(s) orally every 6 hours as needed for Severe Pain (7 - 10) MDD: 8mg  hydrOXYzine hydrochloride 25 mg oral tablet: 1 tab(s) orally 2 times a day  metroNIDAZOLE 500 mg oral tablet: 1 tab(s) orally every 8 hours  MIRTAZAPINE 15MG TAB: 1 tab(s) orally once a day  MONTELUKAST 10MG TAB: tab(s) orally once a day  polyethylene glycol 3350 oral powder for reconstitution: 17 gram(s) orally once a day  risperiDONE 2 mg oral tablet: 1 tab(s) orally 2 times a day  senna leaf extract oral tablet: 2 tab(s) orally once a day (at bedtime)  Seroquel 100 mg oral tablet: 1 tab(s) orally 2 times a day  Seroquel 200 mg oral tablet: 2 tab(s) orally once a day (at bedtime)  SEVELAMER CARB  M each orally 3 times a day  SPIRIVA RESP 1.25ACT INH: 2 puff(s) inhaled 2 times a day  traZODone 150 mg oral tablet: 2 tab(s) orally once a day (at bedtime)  VENTOLIN  INH: 2 puff(s) inhaled 2 times a day   acetaminophen 500 mg oral tablet: 2 tab(s) orally every 8 hours  AMLODIPINE 5MG TAB: 1 tab(s) orally once a day  ATORVASTATIN 40MG TAB: 1 tab(s) orally once a day  bisacodyl 5 mg oral delayed release tablet: 1 tab(s) orally once a day  budesonide/glycopyrrolate/formoterol 160 mcg-9 mcg-4.8 mcg/inh inhalation aerosol: 2 puff(s) inhaled 2 times a day  Calcitriol Oral Capsule 0.5 MC tab(s) orally once a day  CARVEDILOL 25MG TAB: 1 tab(s) orally 2 times a day  CINACALCET TABLET 30 M tab(s) orally once a day  ciprofloxacin 500 mg oral tablet: 1 tab(s) orally every 12 hours  cyclobenzaprine 5 mg oral tablet: 1 tab(s) orally 3 times a day MDD: 3 tablets  ESCITALOPRAM 10MG TAB: 1 tab(s) orally once a day  fluPHENAZine 5 mg oral tablet: 1 tab(s) orally 2 times a day  Haldol 2 mg oral tablet: 1 tab(s) orally once a day  hydrALAZINE 50 mg oral tablet: 1 tab(s) orally 3 times a day  hydrOXYzine hydrochloride 25 mg oral tablet: 1 tab(s) orally 2 times a day  metroNIDAZOLE 500 mg oral tablet: 1 tab(s) orally every 8 hours  MIRTAZAPINE 15MG TAB: 1 tab(s) orally once a day  MONTELUKAST 10MG TAB: tab(s) orally once a day  polyethylene glycol 3350 oral powder for reconstitution: 17 gram(s) orally once a day  risperiDONE 2 mg oral tablet: 1 tab(s) orally 2 times a day  Seroquel 100 mg oral tablet: 1 tab(s) orally 2 times a day  Seroquel 200 mg oral tablet: 2 tab(s) orally once a day (at bedtime)  SEVELAMER CARB  M each orally 3 times a day  SPIRIVA RESP 1.25ACT INH: 2 puff(s) inhaled 2 times a day  traZODone 150 mg oral tablet: 2 tab(s) orally once a day (at bedtime)  VENTOLIN  INH: 2 puff(s) inhaled 2 times a day

## 2024-02-01 NOTE — DISCHARGE NOTE NURSING/CASE MANAGEMENT/SOCIAL WORK - NSDCPEFALRISK_GEN_ALL_CORE
For information on Fall & Injury Prevention, visit: [email or web address] OR  [email or web address] OR  https://www.cdc.gov/steadi/patient.html

## 2024-02-01 NOTE — PROGRESS NOTE ADULT - ASSESSMENT
# ESRD.  HD MWF. only had 1.5 hrs of HD yesterday. HD today as well.  # acute pancreatitis. S/P robotic  lap javier.  # anemia of CKD epogen on hemodialysis  # hyponatremia- has lots of fluids at bedside. free water restriction 1200mls/min.  # RENAL OSTEODYSTROPHY.  CONT RENVELA. AND CALCITRIOL.  # HTN. on Norvasc and hydralazine.

## 2024-02-01 NOTE — PHYSICAL THERAPY INITIAL EVALUATION ADULT - SIT-TO-STAND BALANCE
good balance

## 2024-02-01 NOTE — PROGRESS NOTE ADULT - PROBLEM SELECTOR PLAN 7
h/o schizophrenia  takes trazodone, hydroxyzine, mirtazapine, risperidone, escitalopram and quetiapine at home  cont home meds

## 2024-02-01 NOTE — PROGRESS NOTE ADULT - ASSESSMENT
Confidential Drug Utilization Report  Search Terms: Ayana Mace, 1980Search Date: 01/31/2024 16:27:32 PM  The Drug Utilization Report below displays all of the controlled substance prescriptions, if any, that your patient has filled in the last twelve months. The information displayed on this report is compiled from pharmacy submissions to the Department, and accurately reflects the information as submitted by the pharmacies.    This report was requested by: Stephanie Barnes | Reference #: [id number]    You have not added a MURIEL number. Keeping your MURIEL number(s) up to date on the My MURIEL # tab will enable the separation of your prescriptions from others in the search results.    Practitioner Count: 0  Pharmacy Count: 0  Current Opioid Prescriptions: 0  Current Benzodiazepine Prescriptions: 0  Current Stimulant Prescriptions: 0      Patient Demographic Information (PDI)       PDI	First Name	Last Name	Birth Date	Gender	Street Address	[facility] Code  A	Ayana Garcia	Cu[place]	1980	Male	[address]	[facility]	[id number]    Prescription Information      PDI Filter:    PDI	Current Rx	Drug Type	Rx Written	Rx Dispensed	Drug	Quantity	Days Supply	Prescriber Name	Prescriber MURIEL #	Payment Method	Dispenser  A	N	O	02/21/2023	02/22/2023	oxycodone-acetaminophen 5-325 mg tab	15	4	Romy Lucero E (MD)	[id number]	[facility]    * - Details of Drug Type : O = Opioid, B = Benzodiazepine, S = Stimulant    * - Drugs marked with an asterisk are compound drugs. If the compound drug is made up of more than one controlled substance, then each controlled substance will be a separate row in the table.

## 2024-02-01 NOTE — DISCHARGE NOTE NURSING/CASE MANAGEMENT/SOCIAL WORK - PATIENT PORTAL LINK FT
You can access the FollowMyHealth Patient Portal offered by [facility] by registering at the following website: [email or web address]. By joining [employer]’s FollowMyHealth portal, you will also be able to view your health information using other applications (apps) compatible with our system.

## 2024-02-01 NOTE — PROGRESS NOTE ADULT - ASSESSMENT
43M PMH HTN, schizophrenia, COPD (4L home O2), and ESRD on HD (MWF via Rt lower AVF ) presenting to the ED with nausea/ vomiting and diarrhea. Admitted for acute pancreatitis. RUQ with findings of cholecystitis. s/p Robot Lap javier POD #1, tolerating liquids well, will advanced diet to regular. No c.o pain voiced, IV pain meds titrated down to P.O OOB to chair today, monitor MAURIZIO drainage output

## 2024-02-02 VITALS
HEART RATE: 68 BPM | SYSTOLIC BLOOD PRESSURE: 138 MMHG | TEMPERATURE: 97 F | DIASTOLIC BLOOD PRESSURE: 68 MMHG | RESPIRATION RATE: 18 BRPM | OXYGEN SATURATION: 99 %

## 2024-02-02 LAB — SURGICAL PATHOLOGY STUDY: SIGNIFICANT CHANGE UP

## 2024-02-02 PROCEDURE — 82962 GLUCOSE BLOOD TEST: CPT

## 2024-02-02 PROCEDURE — 96374 THER/PROPH/DIAG INJ IV PUSH: CPT

## 2024-02-02 PROCEDURE — 87340 HEPATITIS B SURFACE AG IA: CPT

## 2024-02-02 PROCEDURE — 76000 FLUOROSCOPY <1 HR PHYS/QHP: CPT

## 2024-02-02 PROCEDURE — 85025 COMPLETE CBC W/AUTO DIFF WBC: CPT

## 2024-02-02 PROCEDURE — 84295 ASSAY OF SERUM SODIUM: CPT

## 2024-02-02 PROCEDURE — 85610 PROTHROMBIN TIME: CPT

## 2024-02-02 PROCEDURE — 99239 HOSP IP/OBS DSCHRG MGMT >30: CPT

## 2024-02-02 PROCEDURE — 84132 ASSAY OF SERUM POTASSIUM: CPT

## 2024-02-02 PROCEDURE — 93005 ELECTROCARDIOGRAM TRACING: CPT

## 2024-02-02 PROCEDURE — 87641 MR-STAPH DNA AMP PROBE: CPT

## 2024-02-02 PROCEDURE — 99285 EMERGENCY DEPT VISIT HI MDM: CPT

## 2024-02-02 PROCEDURE — 94640 AIRWAY INHALATION TREATMENT: CPT

## 2024-02-02 PROCEDURE — 87637 SARSCOV2&INF A&B&RSV AMP PRB: CPT

## 2024-02-02 PROCEDURE — 80053 COMPREHEN METABOLIC PANEL: CPT

## 2024-02-02 PROCEDURE — 83735 ASSAY OF MAGNESIUM: CPT

## 2024-02-02 PROCEDURE — 86901 BLOOD TYPING SEROLOGIC RH(D): CPT

## 2024-02-02 PROCEDURE — 85730 THROMBOPLASTIN TIME PARTIAL: CPT

## 2024-02-02 PROCEDURE — 74177 CT ABD & PELVIS W/CONTRAST: CPT

## 2024-02-02 PROCEDURE — 93306 TTE W/DOPPLER COMPLETE: CPT

## 2024-02-02 PROCEDURE — 88304 TISSUE EXAM BY PATHOLOGIST: CPT

## 2024-02-02 PROCEDURE — 71045 X-RAY EXAM CHEST 1 VIEW: CPT

## 2024-02-02 PROCEDURE — 76705 ECHO EXAM OF ABDOMEN: CPT

## 2024-02-02 PROCEDURE — 84478 ASSAY OF TRIGLYCERIDES: CPT

## 2024-02-02 PROCEDURE — S2900: CPT

## 2024-02-02 PROCEDURE — 74181 MRI ABDOMEN W/O CONTRAST: CPT

## 2024-02-02 PROCEDURE — 36415 COLL VENOUS BLD VENIPUNCTURE: CPT

## 2024-02-02 PROCEDURE — 85027 COMPLETE CBC AUTOMATED: CPT

## 2024-02-02 PROCEDURE — 99261: CPT

## 2024-02-02 PROCEDURE — 84100 ASSAY OF PHOSPHORUS: CPT

## 2024-02-02 PROCEDURE — 86900 BLOOD TYPING SEROLOGIC ABO: CPT

## 2024-02-02 PROCEDURE — 86850 RBC ANTIBODY SCREEN: CPT

## 2024-02-02 PROCEDURE — 87640 STAPH A DNA AMP PROBE: CPT

## 2024-02-02 PROCEDURE — 83690 ASSAY OF LIPASE: CPT

## 2024-02-02 PROCEDURE — 83605 ASSAY OF LACTIC ACID: CPT

## 2024-02-02 PROCEDURE — 86706 HEP B SURFACE ANTIBODY: CPT

## 2024-02-02 PROCEDURE — 80048 BASIC METABOLIC PNL TOTAL CA: CPT

## 2024-02-02 PROCEDURE — 82330 ASSAY OF CALCIUM: CPT

## 2024-02-02 PROCEDURE — C9399: CPT

## 2024-02-02 PROCEDURE — 82803 BLOOD GASES ANY COMBINATION: CPT

## 2024-02-02 PROCEDURE — 97162 PT EVAL MOD COMPLEX 30 MIN: CPT

## 2024-02-02 PROCEDURE — 96375 TX/PRO/DX INJ NEW DRUG ADDON: CPT

## 2024-02-02 RX ORDER — CLOPIDOGREL BISULFATE 75 MG/1
1 TABLET, FILM COATED ORAL
Qty: 0 | Refills: 0 | DISCHARGE

## 2024-02-02 RX ORDER — CLOPIDOGREL BISULFATE 75 MG/1
75 TABLET, FILM COATED ORAL DAILY
Refills: 0 | Status: DISCONTINUED | OUTPATIENT
Start: 2024-02-02 | End: 2024-02-02

## 2024-02-02 RX ADMIN — CALCITRIOL 0.5 MICROGRAM(S): 0.5 CAPSULE ORAL at 13:23

## 2024-02-02 RX ADMIN — BUDESONIDE AND FORMOTEROL FUMARATE DIHYDRATE 2 PUFF(S): 160; 4.5 AEROSOL RESPIRATORY (INHALATION) at 13:21

## 2024-02-02 RX ADMIN — MUPIROCIN 1 APPLICATION(S): 20 OINTMENT TOPICAL at 06:30

## 2024-02-02 RX ADMIN — SEVELAMER CARBONATE 800 MILLIGRAM(S): 2400 POWDER, FOR SUSPENSION ORAL at 13:22

## 2024-02-02 RX ADMIN — Medication 500 MILLIGRAM(S): at 05:58

## 2024-02-02 RX ADMIN — Medication 25 MILLIGRAM(S): at 05:58

## 2024-02-02 RX ADMIN — ESCITALOPRAM OXALATE 10 MILLIGRAM(S): 10 TABLET, FILM COATED ORAL at 13:22

## 2024-02-02 RX ADMIN — CARVEDILOL PHOSPHATE 25 MILLIGRAM(S): 80 CAPSULE, EXTENDED RELEASE ORAL at 05:58

## 2024-02-02 RX ADMIN — HYDROMORPHONE HYDROCHLORIDE 2 MILLIGRAM(S): 2 INJECTION INTRAMUSCULAR; INTRAVENOUS; SUBCUTANEOUS at 04:20

## 2024-02-02 RX ADMIN — Medication 500 MILLIGRAM(S): at 05:59

## 2024-02-02 RX ADMIN — MIRTAZAPINE 15 MILLIGRAM(S): 45 TABLET, ORALLY DISINTEGRATING ORAL at 13:22

## 2024-02-02 RX ADMIN — ERYTHROPOIETIN 10000 UNIT(S): 10000 INJECTION, SOLUTION INTRAVENOUS; SUBCUTANEOUS at 09:38

## 2024-02-02 RX ADMIN — AMLODIPINE BESYLATE 5 MILLIGRAM(S): 2.5 TABLET ORAL at 05:57

## 2024-02-02 RX ADMIN — QUETIAPINE FUMARATE 300 MILLIGRAM(S): 200 TABLET, FILM COATED ORAL at 13:22

## 2024-02-02 RX ADMIN — RISPERIDONE 2 MILLIGRAM(S): 4 TABLET ORAL at 05:58

## 2024-02-02 RX ADMIN — HALOPERIDOL DECANOATE 2 MILLIGRAM(S): 100 INJECTION INTRAMUSCULAR at 13:22

## 2024-02-02 RX ADMIN — Medication 1000 MILLIGRAM(S): at 05:58

## 2024-02-02 RX ADMIN — Medication 100 MILLIGRAM(S): at 05:58

## 2024-02-02 RX ADMIN — HYDROMORPHONE HYDROCHLORIDE 2 MILLIGRAM(S): 2 INJECTION INTRAMUSCULAR; INTRAVENOUS; SUBCUTANEOUS at 03:58

## 2024-02-02 RX ADMIN — HEPARIN SODIUM 5000 UNIT(S): 5000 INJECTION INTRAVENOUS; SUBCUTANEOUS at 05:57

## 2024-02-02 RX ADMIN — Medication 100 MILLIGRAM(S): at 13:23

## 2024-02-02 RX ADMIN — CYCLOBENZAPRINE HYDROCHLORIDE 5 MILLIGRAM(S): 10 TABLET, FILM COATED ORAL at 05:58

## 2024-02-02 RX ADMIN — Medication 1000 MILLIGRAM(S): at 06:20

## 2024-02-02 NOTE — PROGRESS NOTE ADULT - PROBLEM SELECTOR PLAN 2
US RUQ: Possible acute cholecystitis  pt was on NPO initially, s/p robot lap javier POD #3  Advance diet as tolerated - regular today   pain control - currently on P.O dilaudid 2mg q 4hours PRN  - discussed with pain team, dc home with P.O dilaudid 2mg q 6hours PRN x next 3 more days with bowel regimen   Flagyl and cipro D8- will complete the total of 10 days US RUQ: Possible acute cholecystitis  pt was on NPO initially, s/p robot lap jvaier POD #3  Advance diet as tolerated - regular today   pain control - currently on P.O dilaudid 2mg q 4hours PRN  - discussed with pain team, dc home with P.O dilaudid 2mg q 6hours PRN x next 3 more days with bowel regimen   However, pain controlled well with tylenol has not taken dilaudid for 48hours. As he will have PT at home, preparing to prevent pain so he can participate well in PT   Flagyl and cipro D8- will complete the total of 10 days

## 2024-02-02 NOTE — PROGRESS NOTE ADULT - PROBLEM SELECTOR PLAN 3
h/o ESRD on HD MWF via Rt AVF   follows Dr. La - plan HD today  discussed  with Dr. La, pt was placed on Plavix in 11/2022 due to frequent clots to his AVF, now he has new AVF to Rt arm and no issue so far, thus d/c Plavix

## 2024-02-02 NOTE — PROGRESS NOTE ADULT - PROBLEM SELECTOR PLAN 1
No gallstones seen on RUQ US  p/w with nausea, vomiting, and diarrhea x 1 week  lipase 304    CT AP: -Mild nodular contour of the liver may represent cirrhosis. Splenomegaly and mild varices, suggestive of   associated portal hypertension.   -Mild stranding adjacent to the pancreatic head and body, compatible with acute pancreatitis. No evidence for pancreatic necrosis.  -Pericholecystic stranding is again noted  pain resolved, s/p IVF, advanced diet to regular, tolerating well

## 2024-02-02 NOTE — PROGRESS NOTE ADULT - ASSESSMENT
# ESRD.  HD MWF. Seen on  HD - tolerating well  # acute pancreatitis. S/P robotic  lap javier.  # anemia of CKD epogen on hemodialysis  # hyponatremia- has lots of fluids at bedside. free water restriction 1200mls/min.  # RENAL OSTEODYSTROPHY.  CONT RENVELA. AND CALCITRIOL.  # HTN. on Norvasc and hydralazine.  D/C planning

## 2024-02-02 NOTE — PROGRESS NOTE ADULT - ASSESSMENT
43M s/p robo lap cholecystectomy POD#3  VSS, afebrile, postop stable     PLAN   - pt is cleared for d/c from surgical standpoint   - d/c home with MAURIZIO drain   - ween off pain medications, follow pain recommendations   - continue to hold plavix  - encourage ambulation / OOB / PT   - Remainder of care per primary team  - follow up with Dr. Meadows as an outpatient in 1-2 weeks    TEJAS MEADOWS   [address] [place] [address]  Phone: [phone]  Fax: [phone]

## 2024-02-02 NOTE — ED ADULT TRIAGE NOTE - MEANS OF ARRIVAL
Pt returned call and LM.     Returned call to pt but no answer. LM again requesting return call.     Rosalina Jamil RN   stretcher

## 2024-02-02 NOTE — PROGRESS NOTE ADULT - PROBLEM SELECTOR PLAN 10
s/p robot lap javier 1/30   advanced diet today to regular  MAURIZIO drain monitor   pain control  PT eval- Home PT with bariatric RW : order placed by CM 2/1   d/c home today if pt stable after HD s/p robot lap javier 1/30   advanced diet today to regular  MAURIZIO drain monitor   pain control  PT eval- Home PT with bariatric RW : order placed by CM 2/1   d/c home today if pt stable after HD  spoke with mom in person she is also agreeable to take him home and glad he can get PT at home.

## 2024-02-02 NOTE — PROGRESS NOTE ADULT - SUBJECTIVE AND OBJECTIVE BOX
NP Note discussed with  Primary Attending    Patient is a 43y old  Male who presents with a chief complaint of acute pancreatitis (02 Feb 2024 08:58)      INTERVAL HPI/OVERNIGHT EVENTS: no new complaints    MEDICATIONS  (STANDING):  acetaminophen     Tablet .. 1000 milliGRAM(s) Oral every 8 hours  amLODIPine   Tablet 5 milliGRAM(s) Oral daily  atorvastatin 40 milliGRAM(s) Oral at bedtime  bisacodyl 5 milliGRAM(s) Oral daily  budesonide 160 MICROgram(s)/formoterol 4.5 MICROgram(s) Inhaler 2 Puff(s) Inhalation two times a day  calcitriol   Capsule 0.5 MICROGram(s) Oral daily  carvedilol 25 milliGRAM(s) Oral every 12 hours  chlorhexidine 2% Cloths 1 Application(s) Topical daily  ciprofloxacin     Tablet 500 milliGRAM(s) Oral every 12 hours  cyclobenzaprine 5 milliGRAM(s) Oral three times a day  epoetin cyndie (PROCRIT) Injectable 51035 Unit(s) IV Push <User Schedule>  escitalopram 10 milliGRAM(s) Oral daily  haloperidol     Tablet 2 milliGRAM(s) Oral daily  heparin   Injectable 5000 Unit(s) SubCutaneous every 8 hours  hydrALAZINE 100 milliGRAM(s) Oral three times a day  hydrOXYzine hydrochloride 25 milliGRAM(s) Oral every 12 hours  metroNIDAZOLE    Tablet 500 milliGRAM(s) Oral every 8 hours  mirtazapine 15 milliGRAM(s) Oral daily  mupirocin 2% Ointment 1 Application(s) Both Nostrils two times a day  polyethylene glycol 3350 17 Gram(s) Oral daily  QUEtiapine  milliGRAM(s) Oral daily  risperiDONE   Tablet 2 milliGRAM(s) Oral two times a day  senna 2 Tablet(s) Oral at bedtime  sevelamer carbonate 800 milliGRAM(s) Oral three times a day with meals  tiotropium 2.5 MICROgram(s) Inhaler 2 Puff(s) Inhalation daily  traZODone 100 milliGRAM(s) Oral daily    MEDICATIONS  (PRN):  albuterol    90 MICROgram(s) HFA Inhaler 2 Puff(s) Inhalation every 6 hours PRN Shortness of Breath and/or Wheezing  aluminum hydroxide/magnesium hydroxide/simethicone Suspension 30 milliLiter(s) Oral every 4 hours PRN Dyspepsia  HYDROmorphone   Tablet 2 milliGRAM(s) Oral every 4 hours PRN Severe Pain (7 - 10)  melatonin 3 milliGRAM(s) Oral at bedtime PRN Insomnia  ondansetron Injectable 4 milliGRAM(s) IV Push every 8 hours PRN Nausea and/or Vomiting      __________________________________________________  REVIEW OF SYSTEMS:    CONSTITUTIONAL: No fever, feel weak but he was able to walk with PT using walker   EYES: no acute visual disturbances  NECK: No pain or stiffness  RESPIRATORY: No cough; No shortness of breath  CARDIOVASCULAR: No chest pain, no palpitations  GASTROINTESTINAL No nausea or vomiting; No diarrhea, tolerating diet well, surgical site pain but controlled well with medications   NEUROLOGICAL: No headache or numbness, no tremors  MUSCULOSKELETAL: No joint pain, no muscle pain  GENITOURINARY: no dysuria, no frequency, no hesitancy  PSYCHIATRY: no depression , no anxiety  ALL OTHER  ROS negative        Vital Signs Last 24 Hrs  T(C): 36.4 (02 Feb 2024 09:13), Max: 36.9 (01 Feb 2024 14:38)  T(F): 97.6 (02 Feb 2024 09:13), Max: 98.5 (01 Feb 2024 14:38)  HR: 69 (02 Feb 2024 09:13) (69 - 74)  BP: 145/76 (02 Feb 2024 09:13) (142/68 - 168/78)  BP(mean): 93 (02 Feb 2024 06:17) (93 - 93)  RR: 18 (02 Feb 2024 09:13) (18 - 18)  SpO2: 99% (02 Feb 2024 09:13) (98% - 99%)    Parameters below as of 02 Feb 2024 09:13  Patient On (Oxygen Delivery Method): nasal cannula  O2 Flow (L/min): 2      ________________________________________________  PHYSICAL EXAM:  GENERAL: NAD  HEENT: Normocephalic;  conjunctivae and sclerae clear; moist mucous membranes;   NECK : supple  CHEST/LUNG: Clear to auscultation bilaterally with good air entry + o2 4l/min via N-C   HEART: S1 S2  regular; no murmurs, gallops or rubs  ABDOMEN: Soft, Nontender, Nondistended; Bowel sounds present + MAURIZIO drain   EXTREMITIES: no cyanosis; no edema; no calf tenderness LUE- old AVF    Rt forearm + AVF with good thrills and bruits  SKIN: warm and dry; no rash  NERVOUS SYSTEM:  Awake and alert; Oriented  to place, person and time ; no new deficits    _________________________________________________  LABS:                        8.8    6.74  )-----------( 166      ( 01 Feb 2024 06:44 )             27.7     02-01    126<L>  |  93<L>  |  45<H>  ----------------------------<  117<H>  4.9   |  27  |  9.46<H>    Ca    9.6      01 Feb 2024 06:44  Phos  7.0     02-01  Mg     2.2     02-01        Urinalysis Basic - ( 01 Feb 2024 06:44 )    Color: x / Appearance: x / SG: x / pH: x  Gluc: 117 mg/dL / Ketone: x  / Bili: x / Urobili: x   Blood: x / Protein: x / Nitrite: x   Leuk Esterase: x / RBC: x / WBC x   Sq Epi: x / Non Sq Epi: x / Bacteria: x      CAPILLARY BLOOD GLUCOSE            RADIOLOGY & ADDITIONAL TESTS:  < from: MR MRCP No Cont (01.28.24 @ 12:59) >    ACC: [id number] EXAM:  MR MRCP   ORDERED BY: NEREYDA JUAREZ     PROCEDURE DATE:  01/28/2024          INTERPRETATION:  CLINICAL INFORMATION: Acute pancreatitis.    COMPARISON: No prior abdominal MR is available for comparison. Reference   is made with a previous abdominal CT dated 1/25/2024 and abdominal   ultrasound dated 1/26/2024    CONTRAST/COMPLICATIONS:  IV Contrast: NONE  Oral Contrast: NONE  Complications: None reported at time of study completion    PROCEDURE:  MRI of the abdomen was performed. The patient was severely   claustrophobic. The patient refused to continue the examination; only   coronal T2-weighted sequence was acquired.    FINDINGS: The examination is severely limited due to reason above.  LOWER CHEST: Within normal limits.    LIVER: The liver appears diffusely dark on T2-weighted images.  BILE DUCTS: Normal caliber. The common bile duct measures 3 mm in caliber   which is within normal limits. No gross evidence for choledocholithiasis.    GALLBLADDER: Gallstones noted on the recent abdominal ultrasound dated   1/26/2024 are not visualized at MR. Pericholecystic edema is also noted.   If there is a clinical suspicion for acute cholecystitis, HIDA scan may   be pursued for further evaluation.  SPLEEN: Splenomegaly measuring 15.6 cm. The spleen appears diffusely dark   on T2-weighted images.  PANCREAS: Mild peripancreatic edema, compatible with patient's known   acute pancreatitis.  ADRENALS: Within normal limits.  KIDNEYS/URETERS: Atrophic kidneys bilaterally. Multiple small brightly T2   hyperintense lesions in the kidneys bilaterally, representing probable   cysts.    VISUALIZED PORTIONS:  BOWEL: Within normal limits.  PERITONEUM: No ascites.  VESSELS: Within normal limits.  RETROPERITONEUM/LYMPH NODES: No lymphadenopathy.  ABDOMINAL WALL: Within normal limits.  BONES: Unremarkable.    IMPRESSION: Mild peripancreatic edema, compatible with patient's known   acute pancreatitis.    The common bile duct measures 3 mm in caliber which is within normal   limits. No gross evidence for choledocholithiasis.    Gallstones noted on the recent abdominal ultrasound dated 1/26/2024 are   not visualized at MR. Pericholecystic edema. If there is a clinical   suspicion for acute cholecystitis, HIDA scan may be pursued for further   evaluation.    Splenomegaly.    The liver and spleen appear diffusely dark on T2-weighted images.   Possibility of hemochromatosis is considered. Clinical correlation is   recommended.    --- End of Report ---            ERICK BOWERS MD; Attending Radiologist  This document has been electronically signed. Jan 29 2024  9:01AM    < end of copied text >  < from: US Abdomen Upper Quadrant Right (01.26.24 @ 09:22) >  ACC: [id number] EXAM:  US ABDOMEN RT UPR QUADRANT   ORDERED BY: CATHIE HORTA     *** ADDENDUM # 1 ***    Please note the report contains a voice transcription error. The   corrected line should read as follows: there is NO evidence of   cholelithiasis. Gallbladder wall thickening is noted. If there is concern   for cholecystitis HIDA scan can be obtained    --- End of Report ---    *** END OF ADDENDUM # 1 ***      PROCEDURE DATE:  01/26/2024          INTERPRETATION:  CLINICAL INFORMATION: Acute pancreatitis. Evaluate for   gallbladder disease.    COMPARISON: None available.    TECHNIQUE: Sonography of the right upper quadrant.    FINDINGS:  Liver: Enlarged (21.3 cm), homogeneous echotexture  Bile ducts: Common duct top normal in caliber at 6 mm. Correlate with   LFTs. If necessary, MRCP can be obtained.  Gallbladder: Cholelithiasis with diffuse gallbladder wall thickening.   Correlate with symptomatology. If there is concern for cholecystitis HIDA   scan can be obtained.  Pancreas: Visualized portions are within normal limits.  Right kidney: 6.6 cm. Atrophic and echogenic compatible with chronic   medical renal disease No hydronephrosis.  Ascites: None.  IVC: Visualized portions are within normal limits.    IMPRESSION:  Cholelithiasis. Possible acute cholecystitis. Consider HIDA scan if   symptomatology warrants.  Common duct top  normal. Correlate with LFTs if necessary, MRCP.  Additional findings as discussed      --- End of Report ---    ***Please see the addendum at the top of this report. It may contain   additional important information or changes.****        SUNSHINE LYLES MD; Attending Radiologist  This document has been electronically signed. Jan 26 2024  9:37AM  1st Addendum: SUNSHINE LYLES MD; Attending Radiologist  The first addendum was electronically signed on: Jan 26 2024 10:09AM.    < end of copied text >    Imaging Personally Reviewed:  YES    Consultant(s) Notes Reviewed:   YES    Care Discussed with Consultants : surgery nephrology     Plan of care was discussed with patient and /or primary care giver; all questions and concerns were addressed and care was aligned with patient's wishes.

## 2024-02-02 NOTE — PROGRESS NOTE ADULT - PROBLEM SELECTOR PLAN 8
h/o COPD on 4L O2 at home  saturating 99% on 3L in ED  sat target 88-92%   continue home albuterol, Spiriva, and symbicort

## 2024-02-02 NOTE — PROGRESS NOTE ADULT - PROBLEM SELECTOR PROBLEM 1
Acute pancreatitis
Acute postoperative abdominal pain
Acute pancreatitis

## 2024-02-02 NOTE — ED ADULT NURSE NOTE - CADM POA VASCULAR ACCESS TYPE
Tdap; 01-Feb-2024 22:25; Cong Rose (RN); [employer]; [id number] (Exp. Date: 23-Nov-2025); IntraMuscular; Deltoid Right.; 0.5 milliLiter(s); VIS (VIS Published: 09-May-2013, VIS Presented: 01-Feb-2024);    dialysis catheter

## 2024-02-02 NOTE — PROGRESS NOTE ADULT - NUTRITIONAL ASSESSMENT
This patient has been assessed with a concern for Malnutrition and has been determined to have a diagnosis/diagnoses of Morbid obesity (BMI > 40).    This patient is being managed with:   Diet Full Liquid-  DASH/TLC {Sodium & Cholesterol Restricted}  1200mL Fluid Restriction ([id number])  For patients receiving Renal Replacement - No Protein Restr No Conc K No Conc Phos Low Sodium (RENAL)  Supplement Feeding Modality:  Oral  Nepro Cans or Servings Per Day:  1       Frequency:  Daily  Entered: Jan 28 2024 10:58AM    Diet Full Liquid-  DASH/TLC {Sodium & Cholesterol Restricted}  1200mL Fluid Restriction ([id number])  For patients receiving Renal Replacement - No Protein Restr No Conc K No Conc Phos Low Sodium (RENAL)  Entered: Jan 27 2024 12:57PM    The following pending diet order is being considered for treatment of Morbid obesity (BMI > 40):null
This patient has been assessed with a concern for Malnutrition and has been determined to have a diagnosis/diagnoses of Morbid obesity (BMI > 40).    This patient is being managed with:   Diet Clear Liquid-  Entered: Jan 30 2024  6:50PM    The following pending diet order is being considered for treatment of Morbid obesity (BMI > 40):  Diet Full Liquid-  DASH/TLC {Sodium & Cholesterol Restricted}  1200mL Fluid Restriction ([id number])  For patients receiving Renal Replacement - No Protein Restr No Conc K No Conc Phos Low Sodium (RENAL)  Supplement Feeding Modality:  Oral  Nepro Cans or Servings Per Day:  1       Frequency:  Daily  Entered: Jan 28 2024 10:58AM  
This patient has been assessed with a concern for Malnutrition and has been determined to have a diagnosis/diagnoses of Morbid obesity (BMI > 40).    This patient is being managed with:   Diet NPO after Midnight-     NPO Start Date: 29-Jan-2024   NPO Start Time: 23:59  Except Medications  With Ice Chips/Sips of Water  Entered: Jan 29 2024  3:14PM    Diet NPO-  Except Medications  With Ice Chips/Sips of Water  Entered: Jan 28 2024  5:19PM    The following pending diet order is being considered for treatment of Morbid obesity (BMI > 40):  Diet Full Liquid-  DASH/TLC {Sodium & Cholesterol Restricted}  1200mL Fluid Restriction ([id number])  For patients receiving Renal Replacement - No Protein Restr No Conc K No Conc Phos Low Sodium (RENAL)  Supplement Feeding Modality:  Oral  Nepro Cans or Servings Per Day:  1       Frequency:  Daily  Entered: Jan 28 2024 10:58AM  
This patient has been assessed with a concern for Malnutrition and has been determined to have a diagnosis/diagnoses of Morbid obesity (BMI > 40).    This patient is being managed with:   Diet Renal Restrictions-  For patients receiving Renal Replacement - No Protein Restr No Conc K No Conc Phos Low Sodium  1200mL Fluid Restriction ([id number])  Supplement Feeding Modality:  Oral  Nepro Cans or Servings Per Day:  1       Frequency:  Daily  Entered: Jan 31 2024 12:47PM    The following pending diet order is being considered for treatment of Morbid obesity (BMI > 40):  Diet Full Liquid-  DASH/TLC {Sodium & Cholesterol Restricted}  1200mL Fluid Restriction ([id number])  For patients receiving Renal Replacement - No Protein Restr No Conc K No Conc Phos Low Sodium (RENAL)  Supplement Feeding Modality:  Oral  Nepro Cans or Servings Per Day:  1       Frequency:  Daily  Entered: Jan 28 2024 10:58AM  
This patient has been assessed with a concern for Malnutrition and has been determined to have a diagnosis/diagnoses of Morbid obesity (BMI > 40).    This patient is being managed with:   Diet NPO after Midnight-     NPO Start Date: 29-Jan-2024   NPO Start Time: 23:59  Except Medications  With Ice Chips/Sips of Water  Entered: Jan 29 2024  3:14PM    Diet NPO-  Except Medications  With Ice Chips/Sips of Water  Entered: Jan 28 2024  5:19PM    The following pending diet order is being considered for treatment of Morbid obesity (BMI > 40):  Diet Full Liquid-  DASH/TLC {Sodium & Cholesterol Restricted}  1200mL Fluid Restriction ([id number])  For patients receiving Renal Replacement - No Protein Restr No Conc K No Conc Phos Low Sodium (RENAL)  Supplement Feeding Modality:  Oral  Nepro Cans or Servings Per Day:  1       Frequency:  Daily  Entered: Jan 28 2024 10:58AM  
This patient has been assessed with a concern for Malnutrition and has been determined to have a diagnosis/diagnoses of Morbid obesity (BMI > 40).    This patient is being managed with:   Diet NPO-  Except Medications  With Ice Chips/Sips of Water  Entered: Jan 28 2024  5:19PM    The following pending diet order is being considered for treatment of Morbid obesity (BMI > 40):  Diet Full Liquid-  DASH/TLC {Sodium & Cholesterol Restricted}  1200mL Fluid Restriction ([id number])  For patients receiving Renal Replacement - No Protein Restr No Conc K No Conc Phos Low Sodium (RENAL)  Supplement Feeding Modality:  Oral  Nepro Cans or Servings Per Day:  1       Frequency:  Daily  Entered: Jan 28 2024 10:58AM  
This patient has been assessed with a concern for Malnutrition and has been determined to have a diagnosis/diagnoses of Morbid obesity (BMI > 40).    This patient is being managed with:   Diet Renal Restrictions-  For patients receiving Renal Replacement - No Protein Restr No Conc K No Conc Phos Low Sodium  1200mL Fluid Restriction ([id number])  Supplement Feeding Modality:  Oral  Nepro Cans or Servings Per Day:  1       Frequency:  Daily  Entered: Jan 31 2024 12:47PM    The following pending diet order is being considered for treatment of Morbid obesity (BMI > 40):  Diet Full Liquid-  DASH/TLC {Sodium & Cholesterol Restricted}  1200mL Fluid Restriction ([id number])  For patients receiving Renal Replacement - No Protein Restr No Conc K No Conc Phos Low Sodium (RENAL)  Supplement Feeding Modality:  Oral  Nepro Cans or Servings Per Day:  1       Frequency:  Daily  Entered: Jan 28 2024 10:58AM  
This patient has been assessed with a concern for Malnutrition and has been determined to have a diagnosis/diagnoses of Morbid obesity (BMI > 40).    This patient is being managed with:   Diet NPO after Midnight-     NPO Start Date: 29-Jan-2024   NPO Start Time: 23:59  Except Medications  With Ice Chips/Sips of Water  Entered: Jan 29 2024  3:14PM    Diet NPO-  Except Medications  With Ice Chips/Sips of Water  Entered: Jan 28 2024  5:19PM    The following pending diet order is being considered for treatment of Morbid obesity (BMI > 40):  Diet Full Liquid-  DASH/TLC {Sodium & Cholesterol Restricted}  1200mL Fluid Restriction ([id number])  For patients receiving Renal Replacement - No Protein Restr No Conc K No Conc Phos Low Sodium (RENAL)  Supplement Feeding Modality:  Oral  Nepro Cans or Servings Per Day:  1       Frequency:  Daily  Entered: Jan 28 2024 10:58AM

## 2024-02-02 NOTE — PROGRESS NOTE ADULT - ASSESSMENT
43M PMH HTN, schizophrenia, COPD (4L home O2), and ESRD on HD (MWF via Rt lower AVF ) presenting to the ED with nausea/ vomiting and diarrhea. Admitted for acute pancreatitis. RUQ with findings of cholecystitis. s/p Robot Lap javier 1/30/2023.  tolerating regular diet well, s/p fleet enema with good result, PT eval with home PT with bariatric RW, order placed by CM, pt will get dialyzed today and d/c home. He mentioned the interest of transferring to [facility], however, PT recommends home PT with appropriate equiptment which is RW. Pt will dc home if stable after HD.

## 2024-02-02 NOTE — PROGRESS NOTE ADULT - PROBLEM/PLAN-6
Certified Ostomy Nurse Visit (40 minutes)  See Epic Ostomy Flowsheet for site assessment.     Reason for visit:  Colostomy pre-op marking     Assessment:    Educated pt about colostomy, appliance, and care. Educational material and visual aids provided. Wafer applied to all four surfaces. However the RLQ and LLQ quadrants are not suitable places for a stoma since abdominal skin fold hangs off abdomen toward groin when patient sits up or stands. Writer was able to agueda stoma positions in RUQ and LUQ,avoiding belt line, bones, creases, folds, and scars. Pt assessed in standing, sitting, and bending positions. Pt stated no pinching or discomfort and able to visualize appliance. All questions and concerns answered at this time. Please consult ostomy RN after surgery for teaching.    
DISPLAY PLAN FREE TEXT

## 2024-02-02 NOTE — PROGRESS NOTE ADULT - PROVIDER SPECIALTY LIST ADULT
Nephrology
Nephrology
Internal Medicine
Nephrology
Nephrology
Surgery
Internal Medicine
Nephrology
Nephrology
Surgery
Internal Medicine
Pain Medicine
Internal Medicine
Internal Medicine

## 2024-02-02 NOTE — PROGRESS NOTE ADULT - SUBJECTIVE AND OBJECTIVE BOX
INTERVAL HPI/OVERNIGHT EVENTS: NAEO. AVSS. Patient seen and examined at bedside.  Reports that he has difficulty ambulating. Denies abd pain, nausea, emesis.   Tolerating diet.     Vital Signs Last 24 Hrs  T(C): 36.5 (02 Feb 2024 06:17), Max: 36.9 (01 Feb 2024 14:38)  T(F): 97.7 (02 Feb 2024 06:17), Max: 98.5 (01 Feb 2024 14:38)  HR: 72 (02 Feb 2024 06:17) (72 - 74)  BP: 142/68 (02 Feb 2024 06:17) (142/68 - 168/78)  BP(mean): 93 (02 Feb 2024 06:17) (93 - 93)  RR: 18 (02 Feb 2024 06:17) (18 - 18)  SpO2: 98% (02 Feb 2024 06:17) (98% - 99%)    Parameters below as of 02 Feb 2024 06:17  Patient On (Oxygen Delivery Method): room air      I&O's Detail    01 Feb 2024 07:01  -  02 Feb 2024 07:00  --------------------------------------------------------  IN:  Total IN: 0 mL    OUT:    Bulb (mL): 795 mL  Total OUT: 795 mL    Total NET: -795 mL        aluminum hydroxide/magnesium hydroxide/simethicone Suspension 30 milliLiter(s) Oral every 4 hours PRN  bisacodyl 5 milliGRAM(s) Oral daily  ciprofloxacin     Tablet 500 milliGRAM(s) Oral every 12 hours  metroNIDAZOLE    Tablet 500 milliGRAM(s) Oral every 8 hours  polyethylene glycol 3350 17 Gram(s) Oral daily  senna 2 Tablet(s) Oral at bedtime  sevelamer carbonate 800 milliGRAM(s) Oral three times a day with meals      Physical Exam:  General: AAOx3, No acute distress  HEENT: NC/AT, trachea midline  Respiratory: Nonlabored breathing, equal chest rise b/l   Skin: No jaundice, no icterus  Abdomen: obese, soft, nontender. MAURIZIO with SS fluid. Dressings c/d/i  Extremities: non edematous, no calf pain bilaterally  Lines/Drains/Tubes:     Drains/Tubes:     02-01-24 @ 07:01  -  02-02-24 @ 07:00  --------------------------------------------------------  IN: 0 mL / OUT: 795 mL / NET: -795 mL        Labs:                        8.8    6.74  )-----------( 166      ( 01 Feb 2024 06:44 )             27.7     02-01    126<L>  |  93<L>  |  45<H>  ----------------------------<  117<H>  4.9   |  27  |  9.46<H>    Ca    9.6      01 Feb 2024 06:44  Phos  7.0     02-01  Mg     2.2     02-01          RADIOLOGY & ADDITIONAL STUDIES:

## 2024-02-02 NOTE — PROGRESS NOTE ADULT - SUBJECTIVE AND OBJECTIVE BOX
[facility] : Progress Note :: [phone], (office [phone]),   Dr La / Dr Hui / Dr Barber / Dr Villalobos / Dr Hang CASTELLANO / Dr Mehta / Dr Sanchez / Dr Alber dumont  _____________________________________________________________________________________________    seen on HD     shellfish (Anaphylaxis)  shellfish (Rash)  penicillins (Swelling)  fish (Unknown)  [place] (Anaphylaxis)  penicillin (Anaphylaxis)  Seafood (Hives)  aspirin (Swelling)    Hospital Medications:   MEDICATIONS  (STANDING):  acetaminophen     Tablet .. 1000 milliGRAM(s) Oral every 8 hours  amLODIPine   Tablet 5 milliGRAM(s) Oral daily  atorvastatin 40 milliGRAM(s) Oral at bedtime  bisacodyl 5 milliGRAM(s) Oral daily  budesonide 160 MICROgram(s)/formoterol 4.5 MICROgram(s) Inhaler 2 Puff(s) Inhalation two times a day  calcitriol   Capsule 0.5 MICROGram(s) Oral daily  carvedilol 25 milliGRAM(s) Oral every 12 hours  chlorhexidine 2% Cloths 1 Application(s) Topical daily  ciprofloxacin     Tablet 500 milliGRAM(s) Oral every 12 hours  cyclobenzaprine 5 milliGRAM(s) Oral three times a day  epoetin cyndie (PROCRIT) Injectable 63128 Unit(s) IV Push <User Schedule>  escitalopram 10 milliGRAM(s) Oral daily  haloperidol     Tablet 2 milliGRAM(s) Oral daily  heparin   Injectable 5000 Unit(s) SubCutaneous every 8 hours  hydrALAZINE 100 milliGRAM(s) Oral three times a day  hydrOXYzine hydrochloride 25 milliGRAM(s) Oral every 12 hours  metroNIDAZOLE    Tablet 500 milliGRAM(s) Oral every 8 hours  mirtazapine 15 milliGRAM(s) Oral daily  mupirocin 2% Ointment 1 Application(s) Both Nostrils two times a day  polyethylene glycol 3350 17 Gram(s) Oral daily  QUEtiapine  milliGRAM(s) Oral daily  risperiDONE   Tablet 2 milliGRAM(s) Oral two times a day  senna 2 Tablet(s) Oral at bedtime  sevelamer carbonate 800 milliGRAM(s) Oral three times a day with meals  tiotropium 2.5 MICROgram(s) Inhaler 2 Puff(s) Inhalation daily  traZODone 100 milliGRAM(s) Oral daily        VITALS:  T(F): 97.3 (02-02-24 @ 13:15), Max: 98.5 (02-01-24 @ 14:38)  HR: 68 (02-02-24 @ 13:15)  BP: 138/68 (02-02-24 @ 13:15)  RR: 18 (02-02-24 @ 13:15)  SpO2: 99% (02-02-24 @ 13:15)  Wt(kg): --    02-01 @ 07:01  -  02-02 @ 07:00  --------------------------------------------------------  IN: 0 mL / OUT: 795 mL / NET: -795 mL        PHYSICAL EXAM:  Constitutional: NAD  HEENT: anicteric sclera, oropharynx clear.  Neck: No JVD  Respiratory: CTAB, no wheezes, rales or rhonchi  Cardiovascular: S1, S2, RRR  Gastrointestinal: BS+, soft, NT/ND  Extremities:  No peripheral edema  Neurological: A/O x 3, no focal deficits.   : No CVA tenderness. No hernandez.   Skin: No rashes  Vascular Access: AVF with thrill and bruit    LABS:  02-01    126<L>  |  93<L>  |  45<H>  ----------------------------<  117<H>  4.9   |  27  |  9.46<H>    Ca    9.6      01 Feb 2024 06:44  Phos  7.0     02-01  Mg     2.2     02-01      Creatinine Trend: 9.46 <--, 9.49 <--, 7.82 <--, 10.10 <--, 8.84 <--, 7.28 <--                        8.8    6.74  )-----------( 166      ( 01 Feb 2024 06:44 )             27.7     Urine Studies:  Urinalysis Basic - ( 01 Feb 2024 06:44 )    Color:  / Appearance:  / SG:  / pH:   Gluc: 117 mg/dL / Ketone:   / Bili:  / Urobili:    Blood:  / Protein:  / Nitrite:    Leuk Esterase:  / RBC:  / WBC    Sq Epi:  / Non Sq Epi:  / Bacteria:         RADIOLOGY & ADDITIONAL STUDIES:

## 2024-02-02 NOTE — PROGRESS NOTE ADULT - PROBLEM SELECTOR PROBLEM 8
Morbid obesity with BMI of 40.0-44.9, adult
COPD (chronic obstructive pulmonary disease)

## 2024-02-02 NOTE — PROGRESS NOTE ADULT - REASON FOR ADMISSION
acute pancreatitis

## 2024-02-03 ENCOUNTER — INPATIENT (INPATIENT)
Facility: HOSPITAL | Age: 44
LOS: 2 days | Discharge: HOME HEALTH | DRG: 919 | End: 2024-02-06
Attending: SURGERY | Admitting: SURGERY
Payer: MEDICARE

## 2024-02-03 VITALS
WEIGHT: 315 LBS | HEART RATE: 68 BPM | RESPIRATION RATE: 18 BRPM | OXYGEN SATURATION: 100 % | TEMPERATURE: 98 F | DIASTOLIC BLOOD PRESSURE: 91 MMHG | HEIGHT: 78 IN | SYSTOLIC BLOOD PRESSURE: 170 MMHG

## 2024-02-03 DIAGNOSIS — R10.9 UNSPECIFIED ABDOMINAL PAIN: ICD-10-CM

## 2024-02-03 DIAGNOSIS — R06.02 SHORTNESS OF BREATH: ICD-10-CM

## 2024-02-03 DIAGNOSIS — Z98.890 OTHER SPECIFIED POSTPROCEDURAL STATES: Chronic | ICD-10-CM

## 2024-02-03 LAB
ACETONE SERPL-MCNC: NEGATIVE — SIGNIFICANT CHANGE UP
ALBUMIN SERPL ELPH-MCNC: 3.1 G/DL — LOW (ref 3.5–5)
ALP SERPL-CCNC: 86 U/L — SIGNIFICANT CHANGE UP (ref 40–120)
ALT FLD-CCNC: 40 U/L DA — SIGNIFICANT CHANGE UP (ref 10–60)
ANION GAP SERPL CALC-SCNC: 8 MMOL/L — SIGNIFICANT CHANGE UP (ref 5–17)
ANISOCYTOSIS BLD QL: SLIGHT — SIGNIFICANT CHANGE UP
APTT BLD: 29.1 SEC — SIGNIFICANT CHANGE UP (ref 24.5–35.6)
AST SERPL-CCNC: 23 U/L — SIGNIFICANT CHANGE UP (ref 10–40)
BASOPHILS # BLD AUTO: 0 K/UL — SIGNIFICANT CHANGE UP (ref 0–0.2)
BASOPHILS NFR BLD AUTO: 0 % — SIGNIFICANT CHANGE UP (ref 0–2)
BILIRUB SERPL-MCNC: 0.4 MG/DL — SIGNIFICANT CHANGE UP (ref 0.2–1.2)
BUN SERPL-MCNC: 36 MG/DL — HIGH (ref 7–18)
CALCIUM SERPL-MCNC: 10.2 MG/DL — SIGNIFICANT CHANGE UP (ref 8.4–10.5)
CHLORIDE SERPL-SCNC: 95 MMOL/L — LOW (ref 96–108)
CK SERPL-CCNC: 31 U/L — LOW (ref 35–232)
CO2 SERPL-SCNC: 27 MMOL/L — SIGNIFICANT CHANGE UP (ref 22–31)
CREAT SERPL-MCNC: 9.23 MG/DL — HIGH (ref 0.5–1.3)
D DIMER BLD IA.RAPID-MCNC: 1119 NG/ML DDU — HIGH
DACRYOCYTES BLD QL SMEAR: SLIGHT — SIGNIFICANT CHANGE UP
EGFR: 7 ML/MIN/1.73M2 — LOW
EOSINOPHIL # BLD AUTO: 0 K/UL — SIGNIFICANT CHANGE UP (ref 0–0.5)
EOSINOPHIL NFR BLD AUTO: 0 % — SIGNIFICANT CHANGE UP (ref 0–6)
GLUCOSE SERPL-MCNC: 135 MG/DL — HIGH (ref 70–99)
HCT VFR BLD CALC: 27.6 % — LOW (ref 39–50)
HGB BLD-MCNC: 8.9 G/DL — LOW (ref 13–17)
INR BLD: 0.94 RATIO — SIGNIFICANT CHANGE UP (ref 0.85–1.18)
LIDOCAIN IGE QN: 38 U/L — SIGNIFICANT CHANGE UP (ref 13–75)
LYMPHOCYTES # BLD AUTO: 0.41 K/UL — LOW (ref 1–3.3)
LYMPHOCYTES # BLD AUTO: 7 % — LOW (ref 13–44)
MAGNESIUM SERPL-MCNC: 2 MG/DL — SIGNIFICANT CHANGE UP (ref 1.6–2.6)
MANUAL SMEAR VERIFICATION: SIGNIFICANT CHANGE UP
MCHC RBC-ENTMCNC: 32.2 GM/DL — SIGNIFICANT CHANGE UP (ref 32–36)
MCHC RBC-ENTMCNC: 33.3 PG — SIGNIFICANT CHANGE UP (ref 27–34)
MCV RBC AUTO: 103.4 FL — HIGH (ref 80–100)
MONOCYTES # BLD AUTO: 0.46 K/UL — SIGNIFICANT CHANGE UP (ref 0–0.9)
MONOCYTES NFR BLD AUTO: 8 % — SIGNIFICANT CHANGE UP (ref 2–14)
NEUTROPHILS # BLD AUTO: 4.92 K/UL — SIGNIFICANT CHANGE UP (ref 1.8–7.4)
NEUTROPHILS NFR BLD AUTO: 85 % — HIGH (ref 43–77)
NRBC # BLD: 0 /100 WBCS — SIGNIFICANT CHANGE UP (ref 0–0)
OVALOCYTES BLD QL SMEAR: SLIGHT — SIGNIFICANT CHANGE UP
PLAT MORPH BLD: NORMAL — SIGNIFICANT CHANGE UP
PLATELET # BLD AUTO: 156 K/UL — SIGNIFICANT CHANGE UP (ref 150–400)
PLATELET CLUMP BLD QL SMEAR: SLIGHT
PLATELET COUNT - ESTIMATE: NORMAL — SIGNIFICANT CHANGE UP
POIKILOCYTOSIS BLD QL AUTO: SLIGHT — SIGNIFICANT CHANGE UP
POTASSIUM SERPL-MCNC: 4 MMOL/L — SIGNIFICANT CHANGE UP (ref 3.5–5.3)
POTASSIUM SERPL-SCNC: 4 MMOL/L — SIGNIFICANT CHANGE UP (ref 3.5–5.3)
PROT SERPL-MCNC: 7.3 G/DL — SIGNIFICANT CHANGE UP (ref 6–8.3)
PROTHROM AB SERPL-ACNC: 10.7 SEC — SIGNIFICANT CHANGE UP (ref 9.5–13)
RBC # BLD: 2.67 M/UL — LOW (ref 4.2–5.8)
RBC # FLD: 13.9 % — SIGNIFICANT CHANGE UP (ref 10.3–14.5)
RBC BLD AUTO: ABNORMAL
SODIUM SERPL-SCNC: 130 MMOL/L — LOW (ref 135–145)
TROPONIN I, HIGH SENSITIVITY RESULT: 62.6 NG/L — SIGNIFICANT CHANGE UP
WBC # BLD: 5.79 K/UL — SIGNIFICANT CHANGE UP (ref 3.8–10.5)
WBC # FLD AUTO: 5.79 K/UL — SIGNIFICANT CHANGE UP (ref 3.8–10.5)

## 2024-02-03 PROCEDURE — 99285 EMERGENCY DEPT VISIT HI MDM: CPT

## 2024-02-03 PROCEDURE — 99233 SBSQ HOSP IP/OBS HIGH 50: CPT

## 2024-02-03 PROCEDURE — 71045 X-RAY EXAM CHEST 1 VIEW: CPT | Mod: 26

## 2024-02-03 RX ORDER — QUETIAPINE FUMARATE 200 MG/1
400 TABLET, FILM COATED ORAL AT BEDTIME
Refills: 0 | Status: DISCONTINUED | OUTPATIENT
Start: 2024-02-03 | End: 2024-02-06

## 2024-02-03 RX ORDER — CIPROFLOXACIN LACTATE 400MG/40ML
500 VIAL (ML) INTRAVENOUS EVERY 12 HOURS
Refills: 0 | Status: DISCONTINUED | OUTPATIENT
Start: 2024-02-03 | End: 2024-02-03

## 2024-02-03 RX ORDER — ATORVASTATIN CALCIUM 80 MG/1
40 TABLET, FILM COATED ORAL AT BEDTIME
Refills: 0 | Status: DISCONTINUED | OUTPATIENT
Start: 2024-02-03 | End: 2024-02-06

## 2024-02-03 RX ORDER — HYDROMORPHONE HYDROCHLORIDE 2 MG/ML
0.5 INJECTION INTRAMUSCULAR; INTRAVENOUS; SUBCUTANEOUS EVERY 4 HOURS
Refills: 0 | Status: DISCONTINUED | OUTPATIENT
Start: 2024-02-03 | End: 2024-02-04

## 2024-02-03 RX ORDER — RISPERIDONE 4 MG/1
2 TABLET ORAL
Refills: 0 | Status: DISCONTINUED | OUTPATIENT
Start: 2024-02-03 | End: 2024-02-06

## 2024-02-03 RX ORDER — CIPROFLOXACIN LACTATE 400MG/40ML
500 VIAL (ML) INTRAVENOUS EVERY 24 HOURS
Refills: 0 | Status: DISCONTINUED | OUTPATIENT
Start: 2024-02-03 | End: 2024-02-03

## 2024-02-03 RX ORDER — BUDESONIDE AND FORMOTEROL FUMARATE DIHYDRATE 160; 4.5 UG/1; UG/1
2 AEROSOL RESPIRATORY (INHALATION)
Refills: 0 | Status: DISCONTINUED | OUTPATIENT
Start: 2024-02-03 | End: 2024-02-06

## 2024-02-03 RX ORDER — TIOTROPIUM BROMIDE 18 UG/1
2 CAPSULE ORAL; RESPIRATORY (INHALATION) DAILY
Refills: 0 | Status: DISCONTINUED | OUTPATIENT
Start: 2024-02-03 | End: 2024-02-06

## 2024-02-03 RX ORDER — HYDROMORPHONE HYDROCHLORIDE 2 MG/ML
2 INJECTION INTRAMUSCULAR; INTRAVENOUS; SUBCUTANEOUS EVERY 6 HOURS
Refills: 0 | Status: DISCONTINUED | OUTPATIENT
Start: 2024-02-03 | End: 2024-02-06

## 2024-02-03 RX ORDER — AMLODIPINE BESYLATE 2.5 MG/1
5 TABLET ORAL DAILY
Refills: 0 | Status: DISCONTINUED | OUTPATIENT
Start: 2024-02-03 | End: 2024-02-06

## 2024-02-03 RX ORDER — SEVELAMER CARBONATE 2400 MG/1
800 POWDER, FOR SUSPENSION ORAL THREE TIMES A DAY
Refills: 0 | Status: DISCONTINUED | OUTPATIENT
Start: 2024-02-03 | End: 2024-02-06

## 2024-02-03 RX ORDER — HALOPERIDOL DECANOATE 100 MG/ML
2 INJECTION INTRAMUSCULAR AT BEDTIME
Refills: 0 | Status: DISCONTINUED | OUTPATIENT
Start: 2024-02-03 | End: 2024-02-06

## 2024-02-03 RX ORDER — CARVEDILOL PHOSPHATE 80 MG/1
25 CAPSULE, EXTENDED RELEASE ORAL EVERY 12 HOURS
Refills: 0 | Status: DISCONTINUED | OUTPATIENT
Start: 2024-02-03 | End: 2024-02-06

## 2024-02-03 RX ORDER — CYCLOBENZAPRINE HYDROCHLORIDE 10 MG/1
5 TABLET, FILM COATED ORAL THREE TIMES A DAY
Refills: 0 | Status: DISCONTINUED | OUTPATIENT
Start: 2024-02-03 | End: 2024-02-06

## 2024-02-03 RX ORDER — HEPARIN SODIUM 5000 [USP'U]/ML
5000 INJECTION INTRAVENOUS; SUBCUTANEOUS EVERY 8 HOURS
Refills: 0 | Status: DISCONTINUED | OUTPATIENT
Start: 2024-02-03 | End: 2024-02-06

## 2024-02-03 RX ORDER — MIRTAZAPINE 45 MG/1
15 TABLET, ORALLY DISINTEGRATING ORAL AT BEDTIME
Refills: 0 | Status: DISCONTINUED | OUTPATIENT
Start: 2024-02-03 | End: 2024-02-06

## 2024-02-03 RX ORDER — FAMOTIDINE 10 MG/ML
20 INJECTION INTRAVENOUS ONCE
Refills: 0 | Status: COMPLETED | OUTPATIENT
Start: 2024-02-03 | End: 2024-02-03

## 2024-02-03 RX ORDER — MONTELUKAST 4 MG/1
10 TABLET, CHEWABLE ORAL DAILY
Refills: 0 | Status: DISCONTINUED | OUTPATIENT
Start: 2024-02-03 | End: 2024-02-06

## 2024-02-03 RX ORDER — METRONIDAZOLE 500 MG
500 TABLET ORAL EVERY 8 HOURS
Refills: 0 | Status: DISCONTINUED | OUTPATIENT
Start: 2024-02-03 | End: 2024-02-03

## 2024-02-03 RX ORDER — FLUPHENAZINE HYDROCHLORIDE 1 MG/1
5 TABLET, FILM COATED ORAL DAILY
Refills: 0 | Status: DISCONTINUED | OUTPATIENT
Start: 2024-02-03 | End: 2024-02-06

## 2024-02-03 RX ORDER — ONDANSETRON 8 MG/1
4 TABLET, FILM COATED ORAL ONCE
Refills: 0 | Status: COMPLETED | OUTPATIENT
Start: 2024-02-03 | End: 2024-02-03

## 2024-02-03 RX ORDER — HYDROMORPHONE HYDROCHLORIDE 2 MG/ML
1 INJECTION INTRAMUSCULAR; INTRAVENOUS; SUBCUTANEOUS ONCE
Refills: 0 | Status: DISCONTINUED | OUTPATIENT
Start: 2024-02-03 | End: 2024-02-03

## 2024-02-03 RX ORDER — ESCITALOPRAM OXALATE 10 MG/1
10 TABLET, FILM COATED ORAL DAILY
Refills: 0 | Status: DISCONTINUED | OUTPATIENT
Start: 2024-02-03 | End: 2024-02-06

## 2024-02-03 RX ADMIN — FAMOTIDINE 20 MILLIGRAM(S): 10 INJECTION INTRAVENOUS at 13:39

## 2024-02-03 RX ADMIN — HYDROMORPHONE HYDROCHLORIDE 0.5 MILLIGRAM(S): 2 INJECTION INTRAMUSCULAR; INTRAVENOUS; SUBCUTANEOUS at 21:57

## 2024-02-03 RX ADMIN — HYDROMORPHONE HYDROCHLORIDE 1 MILLIGRAM(S): 2 INJECTION INTRAMUSCULAR; INTRAVENOUS; SUBCUTANEOUS at 13:39

## 2024-02-03 RX ADMIN — QUETIAPINE FUMARATE 400 MILLIGRAM(S): 200 TABLET, FILM COATED ORAL at 21:37

## 2024-02-03 RX ADMIN — CARVEDILOL PHOSPHATE 25 MILLIGRAM(S): 80 CAPSULE, EXTENDED RELEASE ORAL at 17:41

## 2024-02-03 RX ADMIN — HALOPERIDOL DECANOATE 2 MILLIGRAM(S): 100 INJECTION INTRAMUSCULAR at 21:36

## 2024-02-03 RX ADMIN — RISPERIDONE 2 MILLIGRAM(S): 4 TABLET ORAL at 19:53

## 2024-02-03 RX ADMIN — HEPARIN SODIUM 5000 UNIT(S): 5000 INJECTION INTRAVENOUS; SUBCUTANEOUS at 14:00

## 2024-02-03 RX ADMIN — HEPARIN SODIUM 5000 UNIT(S): 5000 INJECTION INTRAVENOUS; SUBCUTANEOUS at 21:36

## 2024-02-03 RX ADMIN — HYDROMORPHONE HYDROCHLORIDE 0.5 MILLIGRAM(S): 2 INJECTION INTRAMUSCULAR; INTRAVENOUS; SUBCUTANEOUS at 21:37

## 2024-02-03 RX ADMIN — MIRTAZAPINE 15 MILLIGRAM(S): 45 TABLET, ORALLY DISINTEGRATING ORAL at 21:36

## 2024-02-03 RX ADMIN — SEVELAMER CARBONATE 800 MILLIGRAM(S): 2400 POWDER, FOR SUSPENSION ORAL at 21:37

## 2024-02-03 RX ADMIN — ATORVASTATIN CALCIUM 40 MILLIGRAM(S): 80 TABLET, FILM COATED ORAL at 21:35

## 2024-02-03 RX ADMIN — HYDROMORPHONE HYDROCHLORIDE 1 MILLIGRAM(S): 2 INJECTION INTRAMUSCULAR; INTRAVENOUS; SUBCUTANEOUS at 14:39

## 2024-02-03 RX ADMIN — BUDESONIDE AND FORMOTEROL FUMARATE DIHYDRATE 2 PUFF(S): 160; 4.5 AEROSOL RESPIRATORY (INHALATION) at 22:12

## 2024-02-03 RX ADMIN — ONDANSETRON 4 MILLIGRAM(S): 8 TABLET, FILM COATED ORAL at 13:39

## 2024-02-03 RX ADMIN — HYDROMORPHONE HYDROCHLORIDE 2 MILLIGRAM(S): 2 INJECTION INTRAMUSCULAR; INTRAVENOUS; SUBCUTANEOUS at 17:41

## 2024-02-03 NOTE — ED ADULT NURSE NOTE - OBJECTIVE STATEMENT
AOX4 +ambulates with a walker complaining of surgical site pain. Patient states gall bladder removal 1/30 with MAURIZIO drain draining slightly pink drainage. No redness or drainage around site. Hx of COPD on 4L NC at home and HD MWF RA fistula

## 2024-02-03 NOTE — CONSULT NOTE ADULT - ASSESSMENT
I have reviewed his CBC, BMP, LFTs, troponin, D-dimer testing. No leukocytosis, Hgb low but above transfusion threshold at 8.9 g/dL. MCV is elevated at 103. LFTs within normal limits. Na low at 130. Troponin negative. D-dimer is elevated at 1119.     I have personally reviewed CXR and it does appear to have a component of pulmonary edema, left costophrenic angle is somewhat blunted.     I have reviewed previous DC summary.     Assessment and Plan:     Mr. Mace is a 43 year old M with PMH HTN, schizophrenia, COPD (4L home O2), and ESRD on HD (MWF via Rt lower AVF ), along with recent admission for gallstone pancreatitis s/p CCY with DC on 2/2/24 presenting to the ED with a one day history of dyspnea and pleuritic chest pain as well as a leaking abdominal catheter.     Regarding his dyspnea and pleuritic right sided chest pain, most concerning on the differential would include acute PE. He is not requiring any increase in his supplemental oxygen and is not tachycardic, but WELLS score is 4.5 if placing PE as the most or equally most probably first diagnosis. His Luna score 2 points. So various scoring systems would place him at low risk or moderate risk, but when combining his presentation with the positive D-dimer, certainly reasonable to rule out PE. This will be complicated given his ESRD with CXR concerning for a degree of pulm edema. Would recommend coordinating CTA PE on same day patient is to be dialyzed. In the meantime, can get V/Q scan.     #Acute Dyspnea  #Pleuritic Chest Pain  #Chronic Hypoxic Respiratory Failure on 4 L Home Oxygen  #COPD  #ESRD on HD MWF  #Mild Pulmonary Edema   #Leaking Abdominal Surgical Drain     Recommendations:  -as above, would coordinate CTA with contrast administration on same day as dialysis given patient makes no urine, has some evidence of vol overload on CXR and is on 4 L supplemental oxygen  -if CTA will be delayed, can get V/Q scan to guide decision re AC  -Nephrology consult for ESRD and dialysis  -given macrocytic anemia, would consider B12 and folate levels; we do not routinely check these in hospital but given presence of macrocytosis and severity of anemia, would consider  -do not feel patient has a post-operative pneumonia: there is no leukocytosis, productive cough, focal consolidation: would continue same antibiotics patient was just discharged with   -PT consultation, likely to DC with rehab after this hospitalization  -abdominal drain management per surgery    Thank you for this consult. Internal Medicine will continue to follow. Please feel free to reach me on Microsoft Teams any time.  I have reviewed his CBC, BMP, LFTs, troponin, D-dimer testing. No leukocytosis, Hgb low but above transfusion threshold at 8.9 g/dL. MCV is elevated at 103. LFTs within normal limits. Na low at 130. Troponin negative. D-dimer is elevated at 1119.     I have personally reviewed CXR and it does appear to have a component of pulmonary edema, left costophrenic angle is somewhat blunted.     I have reviewed previous DC summary.     Assessment and Plan:     Mr. Mace is a 43 year old M with PMH HTN, schizophrenia, COPD (4L home O2), and ESRD on HD (MWF via Rt lower AVF ), along with recent admission for gallstone pancreatitis s/p CCY with DC on 2/2/24 presenting to the ED with a one day history of dyspnea and pleuritic chest pain as well as a leaking abdominal catheter.     Regarding his dyspnea and pleuritic right sided chest pain, most concerning on the differential would include acute PE. He is not requiring any increase in his supplemental oxygen and is not tachycardic, but WELLS score is 4.5 if placing PE as the most or equally most probably first diagnosis. His [place] score 2 points. So various scoring systems would place him at low risk or moderate risk, but when combining his presentation with the positive D-dimer, certainly reasonable to rule out PE. This will be complicated given his ESRD with CXR concerning for a degree of pulm edema. Would recommend coordinating CTA PE on same day patient is to be dialyzed. In the meantime, can get V/Q scan.     #Acute Dyspnea  #Pleuritic Chest Pain  #Chronic Hypoxic Respiratory Failure on 4 L Home Oxygen  #COPD  #ESRD on HD MWF  #Mild Pulmonary Edema   #Leaking Abdominal Surgical Drain     Recommendations:  -as above, would coordinate CTA with contrast administration on same day as dialysis or at least as soon as next day given patient makes no urine, has some evidence of vol overload on CXR and is on 4 L supplemental oxygen; Nephrology on board as well to help coordinate this  -if CTA will be delayed, can get V/Q scan to guide decision re AC  -Nephrology consult for ESRD and dialysis  -given macrocytic anemia, would consider B12 and folate levels; we do not routinely check these in hospital but given presence of macrocytosis and severity of anemia, would consider  -do not feel patient has a post-operative pneumonia: there is no leukocytosis, productive cough, focal consolidation: would continue same antibiotics patient was just discharged with: ciprofloxacin 500 mg twice daily and metronidazole 500 mg three times daily  -PT consultation, likely to DC with rehab after this hospitalization  -abdominal drain management per surgery    Thank you for this consult. Internal Medicine will continue to follow. Please feel free to reach me on [employer] Teams any time.

## 2024-02-03 NOTE — ED PROVIDER NOTE - OBJECTIVE STATEMENT
43-year-old male with history of ESRD last HD yesterday, COPD, O2 dependent on 4 L at home, GERD, HLA, schizophrenia.  Patient claims he had cholecystectomy last Tuesday and was DC home yesterday with MAURIZIO drain.  Patient now comes in complaining of his drain is leaking since yesterday,   Constant epigastric pain radiates to his right upper quadrant since last night, worse with deep inspiration, SOB.  Patient denies fever, coughing, CP, N/V.  Last BM on Thursday, with good appetite.

## 2024-02-03 NOTE — H&P ADULT - NS ATTEND AMEND GEN_ALL_CORE FT
Patient re-admitted to arranged for Rehab / Skilled nursing facility for physical therapy (inability to ambulate) and MAURIZIO drain care.   No change in medical status - will cancel PE study ordered by the ED     to follow up on Monday

## 2024-02-03 NOTE — ED PROVIDER NOTE - PROGRESS NOTE DETAILS
Labs/CXR results explained to pt  Pt was seen by surgery Dr. Bueno who advised admission.  No A/P CT needed.  CTA chest pending  Case also d/w Dr. De La Cruz [facility] for medical  consultation.

## 2024-02-03 NOTE — H&P ADULT - PROBLEM SELECTOR PLAN 1
admitted Dr Bueno, seen and examined in ED with Dr Bueno, renal diet as Eveline burns co-mgmt, renal f/u, social work consult/case mgmt

## 2024-02-03 NOTE — H&P ADULT - ASSESSMENT
Pt discharged yesterday 2/2 from [facility] following  admission for gallstone pancreatitis during which he had his gallbladder removed  He returns today given that he has noticed leaking around his MAURIZIO drain as well as dyspnea    Pt restless last night because he was laying flat in his bed when he is "supposed to be at an incline."     At time of this interview, he says dyspnea has been going on since last night. It is associated with right sided pleuritic chest pain "just under the rib cage." The pain is worse when he tried to take a deep breath. No fever/chills/headache/cough/sputum production. No hemoptysis.        Pt has several chronic medical conditions including obesity, COPD, normally on 4 L of home oxygen, ESRD on HD MWF, he makes no urine.

## 2024-02-03 NOTE — ED ADULT TRIAGE NOTE - PAIN RATING/NUMBER SCALE (0-10): ACTIVITY
Consent: Written consent was obtained and risks were reviewed including but not limited to scarring, infection, bleeding, scabbing, incomplete removal, nerve damage and allergy to anesthesia. X Size Of Lesion In Cm: 0 Post-Care Instructions: I reviewed with the patient in detail post-care instructions. Patient is to keep the biopsy site dry overnight, and then apply bacitracin twice daily until healed. Patient may apply hydrogen peroxide soaks to remove any crusting. Cryotherapy Text: The wound bed was treated with cryotherapy after the biopsy was performed. 5 (moderate pain) Electrodesiccation And Curettage Text: The wound bed was treated with electrodesiccation and curettage after the biopsy was performed. Wound Care: Vaseline Dressing: no dressing applied Bill 53251 For Specimen Handling/Conveyance To Laboratory?: no Detail Level: Detailed Curettage Text: The wound bed was treated with curettage after the biopsy was performed. Lab: [id number] Biopsy Method: scissors Body Location Override (Optional - Billing Will Still Be Based On Selected Body Map Location If Applicable): right superior malar cheek Hemostasis: Electrocautery Electrodesiccation Text: The wound bed was treated with electrodesiccation after the biopsy was performed. Anesthesia Type: 1% lidocaine with epinephrine Biopsy Type: H and E Billing Type: Third-Party Bill Lab Facility: [id number] Silver Nitrate Text: The wound bed was treated with silver nitrate after the biopsy was performed. Notification Instructions: Patient will be notified of biopsy results. However, patient instructed to call the office if not contacted within 2 weeks. Results will be faxed to PCP once they are available. Anesthesia Volume In Cc: 0.5 Type Of Destruction Used: Curettage Body Location Override (Optional - Billing Will Still Be Based On Selected Body Map Location If Applicable): left clavicular skin Body Location Override (Optional - Billing Will Still Be Based On Selected Body Map Location If Applicable): right Neck Body Location Override (Optional - Billing Will Still Be Based On Selected Body Map Location If Applicable): left inner breast

## 2024-02-03 NOTE — CONSULT NOTE ADULT - SUBJECTIVE AND OBJECTIVE BOX
[facility] : Progress Note :: [phone], (office [phone]),   Dr La / Dr Hui / Dr Barber / Dr Villalobos / Dr Hang CASTELLANO / Dr Mehta / Dr Sanchez / Dr Alber dumont  _____________________________________________________________________________________________  Patient is a 43y Male with ESRD on HD MWF at [facility]. Was discharged from this hospital yesterday after admission with acute pancreatitis, s/p robotic lap javier. He is not able to ambulate thus returned to ED for placement to rehab. He was dialysed here yesterday  c/o abdominal pain     PAST MEDICAL & SURGICAL HISTORY:  Morbid obesity with BMI of 40.0-44.9, adult      ESRD on dialysis      Bipolar disorder      DM (diabetes mellitus)      HTN (hypertension)      Migraine      COPD (chronic obstructive pulmonary disease)      Renal failure      Asthma with COPD      Diabetes mellitus, type 2      Hypertension      Schizophrenia      Anxiety      Schizophrenia      COPD (chronic obstructive pulmonary disease)      HTN (hypertension)      ESRD on dialysis      HLD (hyperlipidemia)      H/O hernia repair      S/P arteriovenous (AV) fistula creation  right side        aspirin (Swelling)  penicillins (Swelling)  penicillin (Anaphylaxis)  shellfish (Anaphylaxis)  [place] (Anaphylaxis)  Seafood (Hives)  fish (Unknown)  shellfish (Rash)    Home Medications Reviewed  Hospital Medications:   MEDICATIONS  (STANDING):    SOCIAL HISTORY:  Denies ETOh,Smoking,   FAMILY HISTORY:  FH: lymphoma (Sibling)    FH: rheumatoid arthritis (Sibling)        VITALS:  T(F): 97.5 (02-03-24 @ 11:27), Max: 97.5 (02-03-24 @ 11:27)  HR: 68 (02-03-24 @ 11:27)  BP: 170/91 (02-03-24 @ 11:27)  RR: 18 (02-03-24 @ 11:27)  SpO2: 100% (02-03-24 @ 11:27)  Wt(kg): --    Height (cm): 200.7 (02-03 @ 11:27)  Weight (kg): 181.392 (02-03 @ 11:27)  BMI (kg/m2): 45 (02-03 @ 11:27)  BSA (m2): 3.06 (02-03 @ 11:27)  PHYSICAL EXAM:  Constitutional: obese  HEENT: anicteric sclera, oropharynx clear,  Neck: No JVD  Respiratory: CTAB, no wheezes, rales or rhonchi  Cardiovascular: S1, S2, RRR  Gastrointestinal: BS+, soft, mild tenderness  Extremities:  No peripheral edema  Neurological: A/O x 3, no focal deficits  : No CVA tenderness. No hernandez.   Skin: No rashes  Vascular Access: AVF with trill and bruit     LABS:  02-03    130<L>  |  95<L>  |  36<H>  ----------------------------<  135<H>  4.0   |  27  |  9.23<H>    Ca    10.2      03 Feb 2024 12:40  Mg     2.0     02-03    TPro  7.3  /  Alb  3.1<L>  /  TBili  0.4  /  DBili      /  AST  23  /  ALT  40  /  AlkPhos  86  02-03    Creatinine Trend: 9.23 <--, 9.46 <--, 9.49 <--, 7.82 <--, 10.10 <--, 8.84 <--                        8.9    5.79  )-----------( 156      ( 03 Feb 2024 12:40 )             27.6     Urine Studies:  Urinalysis Basic - ( 03 Feb 2024 12:40 )    Color:  / Appearance:  / SG:  / pH:   Gluc: 135 mg/dL / Ketone:   / Bili:  / Urobili:    Blood:  / Protein:  / Nitrite:    Leuk Esterase:  / RBC:  / WBC    Sq Epi:  / Non Sq Epi:  / Bacteria:         RADIOLOGY & ADDITIONAL STUDIES:                
S: Patient seen and evaluated in the ED  He was just discharged yesterday 2/2 from [facility] following and admission for gallstone pancreatitis during which he had his gallbladder removed  He returns today given that he has noticed leaking around his percutaneous abdominal catheter as well as dyspnea    He says that he "tossed and turned" last night because he was laying flat in his bed when he is "supposed to be at an incline." He was disappointed and surprised to have been sent home he says, as the "hospital was supposed to send me to rehab."    At time of this interview, he says dyspnea has been going on since last night. It is associated with right sided pleuritic chest pain "just under the rib cage." The pain is worse when he tried to take a deep breath. No fever/chills/headache/cough/sputum production. No hemoptysis.     He also says fluid has been leaking around his catheter "something terrible."     He says he has several chronic medical conditions including obesity, COPD, normally on 4 L of home oxygen, ESRD on HD MWF, he makes no urine.     O:  Vital Signs Last 24 Hrs  T(C): 36.4 (03 Feb 2024 11:27), Max: 36.4 (03 Feb 2024 11:27)  T(F): 97.5 (03 Feb 2024 11:27), Max: 97.5 (03 Feb 2024 11:27)  HR: 68 (03 Feb 2024 11:27) (68 - 68)  BP: 170/91 (03 Feb 2024 11:27) (170/91 - 170/91)  BP(mean): --  RR: 18 (03 Feb 2024 11:27) (18 - 18)  SpO2: 100% (03 Feb 2024 11:27) (100% - 100%)    Parameters below as of 03 Feb 2024 11:27  Patient On (Oxygen Delivery Method): nasal cannula  O2 Flow (L/min): 4      GENERAL: patient is a morbidly obese male sitting upright in the ED stretcher in no acute distress, saturating well on 4 L supplemental oxygen   HEAD:  Atraumatic, Normocephalic  EYES: conjunctiva and sclera clear  NECK: Supple, No JVD, but difficult to ascertain given body habitus  CHEST/LUNG: Mildly increased work of breathing but Clear to auscultation bilaterally; No wheeze, rhonchi or crackles  HEART: Regular rate and rhythm; No murmurs, rubs, or gallops  ABDOMEN: Soft, Nontender, Nondistended; Bowel sounds present; some serous fluid leaking and staining bandage around his percutaneous abdominal drain  EXTREMITIES:  2+ Peripheral Pulses, No clubbing, cyanosis; there is trace pedal edema bilaterally  PSYCH: AAOx3  NEUROLOGY: non-focal  SKIN: No rashes or lesions    heparin   Injectable 5000 Unit(s) SubCutaneous every 8 hours                            8.9    5.79  )-----------( 156      ( 03 Feb 2024 12:40 )             27.6       02-03    130<L>  |  95<L>  |  36<H>  ----------------------------<  135<H>  4.0   |  27  |  9.23<H>    Ca    10.2      03 Feb 2024 12:40  Mg     2.0     02-03    TPro  7.3  /  Alb  3.1<L>  /  TBili  0.4  /  DBili  x   /  AST  23  /  ALT  40  /  AlkPhos  86  02-03

## 2024-02-03 NOTE — H&P ADULT - NSHPPHYSICALEXAM_GEN_ALL_CORE
T(C): 37.2 (02-03-24 @ 14:30), Max: 37.2 (02-03-24 @ 14:30)  HR: 76 (02-03-24 @ 14:30) (68 - 76)  BP: 158/86 (02-03-24 @ 14:30) (158/86 - 170/91)  RR: 18 (02-03-24 @ 14:30) (18 - 18)  SpO2: 100% (02-03-24 @ 14:30) (100% - 100%)    GENERAL: patient is a morbidly obese male sitting upright in the ED stretcher in no acute distress, saturating well on 4 L supplemental oxygen   HEAD:  Atraumatic, Normocephalic  EYES: conjunctiva and sclera clear  CHEST/LUNG: Mildly increased work of breathing but Clear to auscultation bilaterally; No wheeze, rhonchi or crackles  HEART: Regular rate and rhythm; No murmurs, rubs, or gallops  ABDOMEN: Soft, Nontender, Nondistended; Bowel sounds present; some serous fluid leaking and staining bandage around his MAURIZIO, inc CDI  EXTREMITIES:  2+ Peripheral Pulses, No clubbing, cyanosis; there is trace pedal edema bilaterally  PSYCH: AAOx3  SKIN: No rashes or lesions

## 2024-02-03 NOTE — ED ADULT TRIAGE NOTE - HEIGHT IN INCHES
Patient:   MARIANGEL JETT            MRN: [id number]            FIN: [id number]              Age:   81 years     Sex:  FEMALE     :  39   Associated Diagnoses:   None   Author:   JOSEY CORBETT     Basic Information   Medications: Medications (3) Active  Scheduled: (2)  Enoxaparin 40 mg/0.4 mL syringe  40 mg 0.4 mL, Subcutaneous, Daily  Pneumococcal adult vaccine 23-polyvalent 0.5 mL IM inj SDV  0.5 mL, IM, On Call  Continuous: (0)  PRN: (1)  Acetaminophen 500 mg tab  500 mg 1 tab, Oral, Q8H   .   Allergies: Allergies (1) Active Reaction  No Known Medication Allergies None Documented   .     History of Present Illness   patient with longstanding history of anxiety and mild hallucinations managed by the SSRI and strong family support seen in my office on Monday with dramatic worsening of the hallucinations to the poisn of paranoia- She called police 3 times to rid the house of the invadors. Family was unable to handle her but declined admit to the psychiatric [facility] at that time. I discussed her case with dr Phillips and we agreed on trying Risperdone 0.5 mg.  Apparently patient developed UTI that made the symptoms worse and family decided to bring her to ED. Patient is astarted on appropriate treatment for UTI and dr Phillips consult with possible intake to psychiatric [facility] is expected. Patient seen in ER Son by the bedside.  Today patient is calalmer and able to converse without any interruptions although she still has some hallucinations and paranoia. Admits to felling well except for dizziness when turning head or lying down / getting up. Denies dysuria or hematuria No fever no lower back pain No chest pains or palpitations. when asked about alcohol intake admits drinking wine daily socially      Review of Systems   Constitutional symptoms:  No fever, no chills.    Cardiovascular symptoms:  No chest pain, no palpitations.    Respiratory symptoms:  No shortness of breath, no orthopnea.     Gastrointestinal symptoms:  No abdominal pain,    Musculoskeletal symptoms: no back pain.   Neurologic symptoms:  Dizziness, altered level of consciousness, No headache,   Endocrine symptoms:  No polyuria, no polydipsia.    Allergy/Immunologic symptoms:  Seasonal allergies.   Psychiatric symptoms:  Anxiety, paranoia / hallucinations.      Past Medical/ Family/ Social History   Medical history:    All Problems  Vertigo / [id number] / Confirmed  Osteoporosis / [id number] / Confirmed  Hypertension / [id number] / Confirmed  Diabetes mellitus / 685157536 / Confirmed  Chronic pain / [id number] / Confirmed.   Surgical history:    Hysterectomy ([id number]).  vericose vein procedure..   Family history:    FATHER  Asthma  .   Social history:    Alcohol  Details: Alcohol Abuse in Household: No.  Use: Past.  Exercise  Details: Exercise: Occasionally.  Times Per Week: 3-4 times/week.  Type: Walking.  Substance Abuse  Details: Substance Abuse in Household: No.  Use: None.  Tobacco  Details: Smoker in Houshold: No.  Smoked/Smokeless Tobacco Last 30 Days: No.  Smoking Tobacco Use: Never smoker.  Smokeless Tobacco Use Never.  .     Medical history     Physical Examination   General:  Alert, no acute distress.    Skin:  Warm, dry.    Neck:  Supple, trachea midline.    Cardiovascular:  Regular rate and rhythm, Normal peripheral perfusion, No edema.   Respiratory:  Lungs are clear to auscultation.   Gastrointestinal:  Soft, Nontender, Non distended, Normal bowel sounds.   Back:  Nontender, Normal range of motion.    Musculoskeletal:  Normal ROM.   Neurological:  No focal neurological deficit observed, normal speech observed, normal coordination observed.   Psychiatric:  Cooperative, hallucinating .      Labs between:  27-FEB-2019 08:12 to 28-FEB-2019 08:12    CBC:                 WBC  HgB  Hct  Plt  MCV  RDW   27-FEB-2019 (L) 2.8  12.1  (L) 35.8  (L) 78  91.3  13.4     DIFF:                 Seg  Neutroph//ABS  Lymph//ABS  Mono//ABS   EOS/ABS  27-FEB-2019 NOT APPLICABLE  51 // (L) 1.4  36 // 1.0 10 // 0.3 3 // 0.1    BMP:                 Na  Cl  BUN  Glu   27-FEB-2019 140  106  (H) 24  (H) 100                              K  CO2  Cr  Ca                              3.6  26  0.63  8.7     CMP:                 AST  ALT  AlkPhos  Bili  Albumin   27-FEB-2019 27  46  73  0.3  4.5     Other Chem:             Mg  Phos  Triglycerides  GGTP  DirectBili                           2.2                              Result title:  XR CHEST 1V  Result status:  Final  Verified by:  ISABELLE COLÓN on 02/27/2019 1:27  EXAM: XR CHEST 1VCLINICAL INDICATION: Chest pain.COMPARISON: None.FINDINGS:The Cardiomediastinal silhouette is of normal size and configuration.  There is no evidence of pulmonary vasculature congestion.The lungs are well-aerated.  No consolidation, effusions or pneumothorax identified.The regional bones are intact.IMPRESSIONNo radiographic evidence of an acute pulmonary process.        Vitals between:   27-FEB-2019 08:12:54   TO   28-FEB-2019 08:12:54                   LAST RESULT MINIMUM MAXIMUM  Temperature 36.9 36.9 36.9  Heart Rate 68 66 76  Respiratory Rate 19 16 19  NISBP           157 93 168  NIDBP           79 74 83  NIMBP           105 82 111  SpO2                    96 96 99      NO DATA QUALIFIED FOR THIS ENCOUNTER IN THE PAST 24 HOURS.      NO VENTILATORS QUALIFIED        Medical Decision Making   Orders  Launch Orders   Consults:  Consult Physician (Order Processing): ROUTINE, 02/28/19 08:18, CORY, MAYO YAÑEZ, leucopenia/ thrombocytopenia, Ordered.  # UTI- continue cephalexin and IVF  # leucopenia / thrombocytopenia- will involve hematology- no known hx of etoh abuse  # anxiety/ paranoia- as per psychiatry  # seasonal allergies alergies- inactive  # dizziness- chronic - BPV trated with meclizine  # HTN - continue home meds wth parameters to hold if SBP below 120 or HR below 60  # DMtype 2 - will hold Glimepiride and check glucose  daily/ diabetic diet and low dose sliding scale  # dehydration-will give IVF 0.9 NS with KCL 10meq and check orthostatics  # discussed with patient and son in detailsDiscussed with RN Decision to admit to medical floor rather than psychiatric unit due to acute medical issues. Will transfer to psychiatric [facility] when appropriate by dr Phillips and cleared medically   7

## 2024-02-03 NOTE — ED ADULT NURSE NOTE - HOW OFTEN DO YOU HAVE A DRINK CONTAINING ALCOHOL?
Pt was seen in ED last night for vomiting and diarrhea x 4 days.  Pt works in .  No vomiting, no fever but diarrhea persists.  She would like to know if she is cleared to return to work.  She is feeling better and manuel like to return to work.    Pt was advised to ask with her employee health about any restrictions about work and viral illnesses.    
Regarding: Vomiting and diarrhea  ----- Message from Jo Garcia sent at 3/27/2017 11:20 AM CDT -----  PCP or Specialist: None   Pregnant (If Yes, how long?): no  Symptoms: Pt was seen in ER last night for vomiting and diarrhea. Pt says symptoms have not gotten better, wondering if she should go to work tomorrow.   Call Back #: [phone]  Call Center Account #: [facility]/Call A Nurse [id number]  
Never

## 2024-02-03 NOTE — ED ADULT NURSE NOTE - NSFALLRISKINTERV_ED_ALL_ED

## 2024-02-03 NOTE — H&P ADULT - HISTORY OF PRESENT ILLNESS
Pt discharged yesterday 2/2 from [facility] following  admission for gallstone pancreatitis during which he had his gallbladder removed  He returns today given that he has noticed leaking around his MAURIZIO drain as well as dyspnea    Pt restless last night because he was laying flat in his bed when he is "supposed to be at an incline."     At time of this interview, he says dyspnea has been going on since last night. It is associated with right sided pleuritic chest pain "just under the rib cage." The pain is worse when he tried to take a deep breath. No fever/chills/headache/cough/sputum production. No hemoptysis.        Pt has several chronic medical conditions including obesity, COPD, normally on 4 L of home oxygen, ESRD on HD MWF, he makes no urine.

## 2024-02-03 NOTE — ED PROVIDER NOTE - MUSCULOSKELETAL, MLM
Spine appears normal, range of motion is not limited, no muscle or joint tenderness, Rt lower back-sl tenderness to palp, Rt volar distal forearm-AVG with thrills

## 2024-02-03 NOTE — CONSULT NOTE ADULT - ASSESSMENT
Patient is a 43y Male with ESRD on HD MWF at [facility]. Was discharged from this hospital yesterday after admission with acute pancreatitis, s/p robotic lap javier. He is not able to ambulate thus returned to ED for placement to rehab. He was dialysed here yesterday  c/o abdominal pain   labs are pending    # ESRD. resume HD MWF  # s/p lap javier. surg F/U  # anemia of CKD. epogen on HD   # CKDMBD. resume renvela . hypercalcemia. start sensipar 30mg daily  # hyponatremia- fluid restriction 1.2 L/DAY    thanks for the consult

## 2024-02-03 NOTE — ED ADULT NURSE NOTE - ED STAT RN HANDOFF DETAILS
Patient being transported by stretcher by transporter with oxygen stable in oanh ci[place] distress.

## 2024-02-04 LAB
ANION GAP SERPL CALC-SCNC: 7 MMOL/L — SIGNIFICANT CHANGE UP (ref 5–17)
BUN SERPL-MCNC: 38 MG/DL — HIGH (ref 7–18)
CALCIUM SERPL-MCNC: 10.6 MG/DL — HIGH (ref 8.4–10.5)
CHLORIDE SERPL-SCNC: 94 MMOL/L — LOW (ref 96–108)
CO2 SERPL-SCNC: 28 MMOL/L — SIGNIFICANT CHANGE UP (ref 22–31)
CREAT SERPL-MCNC: 10.3 MG/DL — HIGH (ref 0.5–1.3)
EGFR: 6 ML/MIN/1.73M2 — LOW
GLUCOSE BLDC GLUCOMTR-MCNC: 122 MG/DL — HIGH (ref 70–99)
GLUCOSE SERPL-MCNC: 103 MG/DL — HIGH (ref 70–99)
HCT VFR BLD CALC: 28.7 % — LOW (ref 39–50)
HGB BLD-MCNC: 9 G/DL — LOW (ref 13–17)
MCHC RBC-ENTMCNC: 31.4 GM/DL — LOW (ref 32–36)
MCHC RBC-ENTMCNC: 32.8 PG — SIGNIFICANT CHANGE UP (ref 27–34)
MCV RBC AUTO: 104.7 FL — HIGH (ref 80–100)
NRBC # BLD: 0 /100 WBCS — SIGNIFICANT CHANGE UP (ref 0–0)
PLATELET # BLD AUTO: 169 K/UL — SIGNIFICANT CHANGE UP (ref 150–400)
POTASSIUM SERPL-MCNC: 4.2 MMOL/L — SIGNIFICANT CHANGE UP (ref 3.5–5.3)
POTASSIUM SERPL-SCNC: 4.2 MMOL/L — SIGNIFICANT CHANGE UP (ref 3.5–5.3)
RBC # BLD: 2.74 M/UL — LOW (ref 4.2–5.8)
RBC # FLD: 14.1 % — SIGNIFICANT CHANGE UP (ref 10.3–14.5)
SODIUM SERPL-SCNC: 129 MMOL/L — LOW (ref 135–145)
WBC # BLD: 6 K/UL — SIGNIFICANT CHANGE UP (ref 3.8–10.5)
WBC # FLD AUTO: 6 K/UL — SIGNIFICANT CHANGE UP (ref 3.8–10.5)

## 2024-02-04 PROCEDURE — 99232 SBSQ HOSP IP/OBS MODERATE 35: CPT

## 2024-02-04 PROCEDURE — 71275 CT ANGIOGRAPHY CHEST: CPT | Mod: 26

## 2024-02-04 RX ORDER — ERYTHROPOIETIN 10000 [IU]/ML
4000 INJECTION, SOLUTION INTRAVENOUS; SUBCUTANEOUS
Refills: 0 | Status: DISCONTINUED | OUTPATIENT
Start: 2024-02-04 | End: 2024-02-06

## 2024-02-04 RX ORDER — CINACALCET 30 MG/1
30 TABLET, FILM COATED ORAL DAILY
Refills: 0 | Status: DISCONTINUED | OUTPATIENT
Start: 2024-02-04 | End: 2024-02-06

## 2024-02-04 RX ORDER — ACETAMINOPHEN 500 MG
1000 TABLET ORAL EVERY 8 HOURS
Refills: 0 | Status: DISCONTINUED | OUTPATIENT
Start: 2024-02-04 | End: 2024-02-04

## 2024-02-04 RX ORDER — LIDOCAINE 4 G/100G
1 CREAM TOPICAL ONCE
Refills: 0 | Status: COMPLETED | OUTPATIENT
Start: 2024-02-04 | End: 2024-02-04

## 2024-02-04 RX ADMIN — HEPARIN SODIUM 5000 UNIT(S): 5000 INJECTION INTRAVENOUS; SUBCUTANEOUS at 05:08

## 2024-02-04 RX ADMIN — QUETIAPINE FUMARATE 400 MILLIGRAM(S): 200 TABLET, FILM COATED ORAL at 21:02

## 2024-02-04 RX ADMIN — ESCITALOPRAM OXALATE 10 MILLIGRAM(S): 10 TABLET, FILM COATED ORAL at 12:20

## 2024-02-04 RX ADMIN — MONTELUKAST 10 MILLIGRAM(S): 4 TABLET, CHEWABLE ORAL at 12:20

## 2024-02-04 RX ADMIN — TIOTROPIUM BROMIDE 2 PUFF(S): 18 CAPSULE ORAL; RESPIRATORY (INHALATION) at 12:19

## 2024-02-04 RX ADMIN — RISPERIDONE 2 MILLIGRAM(S): 4 TABLET ORAL at 17:56

## 2024-02-04 RX ADMIN — SEVELAMER CARBONATE 800 MILLIGRAM(S): 2400 POWDER, FOR SUSPENSION ORAL at 21:03

## 2024-02-04 RX ADMIN — AMLODIPINE BESYLATE 5 MILLIGRAM(S): 2.5 TABLET ORAL at 05:08

## 2024-02-04 RX ADMIN — HYDROMORPHONE HYDROCHLORIDE 2 MILLIGRAM(S): 2 INJECTION INTRAMUSCULAR; INTRAVENOUS; SUBCUTANEOUS at 21:48

## 2024-02-04 RX ADMIN — ATORVASTATIN CALCIUM 40 MILLIGRAM(S): 80 TABLET, FILM COATED ORAL at 21:02

## 2024-02-04 RX ADMIN — HEPARIN SODIUM 5000 UNIT(S): 5000 INJECTION INTRAVENOUS; SUBCUTANEOUS at 21:02

## 2024-02-04 RX ADMIN — BUDESONIDE AND FORMOTEROL FUMARATE DIHYDRATE 2 PUFF(S): 160; 4.5 AEROSOL RESPIRATORY (INHALATION) at 11:06

## 2024-02-04 RX ADMIN — HALOPERIDOL DECANOATE 2 MILLIGRAM(S): 100 INJECTION INTRAMUSCULAR at 21:02

## 2024-02-04 RX ADMIN — HYDROMORPHONE HYDROCHLORIDE 0.5 MILLIGRAM(S): 2 INJECTION INTRAMUSCULAR; INTRAVENOUS; SUBCUTANEOUS at 10:18

## 2024-02-04 RX ADMIN — MIRTAZAPINE 15 MILLIGRAM(S): 45 TABLET, ORALLY DISINTEGRATING ORAL at 21:03

## 2024-02-04 RX ADMIN — HYDROMORPHONE HYDROCHLORIDE 0.5 MILLIGRAM(S): 2 INJECTION INTRAMUSCULAR; INTRAVENOUS; SUBCUTANEOUS at 05:31

## 2024-02-04 RX ADMIN — HYDROMORPHONE HYDROCHLORIDE 0.5 MILLIGRAM(S): 2 INJECTION INTRAMUSCULAR; INTRAVENOUS; SUBCUTANEOUS at 09:14

## 2024-02-04 RX ADMIN — HYDROMORPHONE HYDROCHLORIDE 2 MILLIGRAM(S): 2 INJECTION INTRAMUSCULAR; INTRAVENOUS; SUBCUTANEOUS at 15:55

## 2024-02-04 RX ADMIN — CARVEDILOL PHOSPHATE 25 MILLIGRAM(S): 80 CAPSULE, EXTENDED RELEASE ORAL at 05:08

## 2024-02-04 RX ADMIN — SEVELAMER CARBONATE 800 MILLIGRAM(S): 2400 POWDER, FOR SUSPENSION ORAL at 05:08

## 2024-02-04 RX ADMIN — BUDESONIDE AND FORMOTEROL FUMARATE DIHYDRATE 2 PUFF(S): 160; 4.5 AEROSOL RESPIRATORY (INHALATION) at 21:05

## 2024-02-04 RX ADMIN — HYDROMORPHONE HYDROCHLORIDE 2 MILLIGRAM(S): 2 INJECTION INTRAMUSCULAR; INTRAVENOUS; SUBCUTANEOUS at 21:02

## 2024-02-04 RX ADMIN — FLUPHENAZINE HYDROCHLORIDE 5 MILLIGRAM(S): 1 TABLET, FILM COATED ORAL at 12:20

## 2024-02-04 RX ADMIN — SEVELAMER CARBONATE 800 MILLIGRAM(S): 2400 POWDER, FOR SUSPENSION ORAL at 16:37

## 2024-02-04 RX ADMIN — HEPARIN SODIUM 5000 UNIT(S): 5000 INJECTION INTRAVENOUS; SUBCUTANEOUS at 14:54

## 2024-02-04 RX ADMIN — HYDROMORPHONE HYDROCHLORIDE 0.5 MILLIGRAM(S): 2 INJECTION INTRAMUSCULAR; INTRAVENOUS; SUBCUTANEOUS at 05:09

## 2024-02-04 RX ADMIN — LIDOCAINE 1 PATCH: 4 CREAM TOPICAL at 20:33

## 2024-02-04 RX ADMIN — CARVEDILOL PHOSPHATE 25 MILLIGRAM(S): 80 CAPSULE, EXTENDED RELEASE ORAL at 17:57

## 2024-02-04 RX ADMIN — RISPERIDONE 2 MILLIGRAM(S): 4 TABLET ORAL at 05:08

## 2024-02-04 RX ADMIN — HYDROMORPHONE HYDROCHLORIDE 2 MILLIGRAM(S): 2 INJECTION INTRAMUSCULAR; INTRAVENOUS; SUBCUTANEOUS at 14:45

## 2024-02-04 NOTE — PATIENT PROFILE ADULT - FALL HARM RISK - RISK INTERVENTIONS
Assistance OOB with selected safe patient handling equipment/Assistance with ambulation/Communicate Fall Risk and Risk Factors to all staff, patient, and family/Monitor gait and stability/Reinforce activity limits and safety measures with patient and family/Sit up slowly, dangle for a short time, stand at bedside before walking/Use of alarms - bed, chair and/or voice tab/Visual Cue: Yellow wristband/Bed in lowest position, wheels locked, appropriate side rails in place/Call bell, personal items and telephone in reach/Instruct patient to call for assistance before getting out of bed or chair/Non-slip footwear when patient is out of bed/[place] to call system/Physically safe environment - no spills, clutter or unnecessary equipment/Purposeful Proactive Rounding/Room/bathroom lighting operational, light cord in reach

## 2024-02-04 NOTE — PROGRESS NOTE ADULT - ASSESSMENT
I have reviewed his CBC, BMP. Hgb stable, 9 g/dL. No leukocytosis. Na low at 129. K 4.2.     I have discussed case with Dr. La over the phone. Patient is on the schedule for HD on Monday morning 2/5/24    Assessment and Plan:     Mr. Mace is a 43 year old M with PMH HTN, schizophrenia, COPD (4L home O2), and ESRD on HD (MWF via Rt lower AVF ), along with recent admission for gallstone pancreatitis s/p CCY with DC on 2/2/24 presenting to the ED with a one day history of dyspnea and pleuritic chest pain as well as a leaking abdominal catheter.     Regarding his dyspnea and pleuritic right sided chest pain, most concerning on the differential would include acute PE. He is not requiring any increase in his supplemental oxygen and is not tachycardic, but WELLS score is 4.5 if placing PE as the most or equally most probably first diagnosis. His [place] score 2 points. So various scoring systems would place him at low risk or moderate risk, but when combining his presentation with the positive D-dimer, certainly reasonable to rule out PE. This will be complicated given his ESRD with CXR concerning for a degree of pulm edema. Ok to get CTA of the chest in the PM on 2/4 as dialysis is planned for AM 2/5    #Acute Dyspnea  #Pleuritic Chest Pain  #Chronic Hypoxic Respiratory Failure on 4 L Home Oxygen  #COPD  #ESRD on HD MWF  #Mild Pulmonary Edema   #Leaking Abdominal Surgical Drain     Recommendations:  -CTA PE ordered  -Nephrology consult for ESRD and dialysis, HD in AM 2/5  -given macrocytic anemia, would consider B12 and folate levels; we do not routinely check these in hospital but given presence of macrocytosis and severity of anemia, would consider  -do not feel patient has a post-operative pneumonia: there is no leukocytosis, productive cough, focal consolidation: would continue same antibiotics patient was just discharged with: ciprofloxacin 500 mg (dosed once daily given patient's Cr clearance) and metronidazole 500 mg three times daily  -PT consultation, likely to DC with rehab after this hospitalization  -abdominal drain management per surgery  -can decrease about of supplemental oxygen supplied to target O2 sat 88-92%; patient has chart diagnosis of COPD, not sure if he has had PFTs or not, but would not want to consistently saturate 100% in patient with COPD    Thank you for this consult. Internal Medicine will continue to follow. Please feel free to reach me on [employer] Teams any time.

## 2024-02-04 NOTE — CHART NOTE - NSCHARTNOTEFT_GEN_A_CORE
Nurse called to evaluate patient for reported "chest pain"    The patient was seen and examined at bedside.   Pt c/o right sided abdominal/flank pain. He denies chest pain, shortness of breath, headache, dizziness, calf pain.     BP: 162/87    Abdomen soft, slight right sided abdominal tenderness to palpation.   No calf tenderness.   Radial pulses equal and regular    Requested nurse to administer PO Dilaudid active order  Call back surgery if condition worsens  Pending CT angio study to r/o PE per medicine

## 2024-02-04 NOTE — PROGRESS NOTE ADULT - ASSESSMENT
43 year old male with laparoscopic cholecystectomy 1/30    - social work/case management  - diet as tolerated  - discharge planning   - discuss with Dr. Bueno

## 2024-02-04 NOTE — CHART NOTE - NSCHARTNOTEFT_GEN_A_CORE
I discussed case with Nephrology Dr. La. Patient is on the schedule for HD in the AM on 2/5. Ok to proceed with CTA chest with contrast in afternoon/evening of 2/4/24.

## 2024-02-04 NOTE — PROGRESS NOTE ADULT - ASSESSMENT
Patient is a 43y Male with ESRD on HD MWF at [facility]. Was discharged from this hospital yesterday after admission with acute pancreatitis, s/p robotic lap javier. He is not able to ambulate thus returned to ED for placement to rehab. He was dialysed here yesterday  c/o abdominal pain   labs are pending    # ESRD. resume HD MWF- dialyse in AM post crast   # s/p lap javier. mx per surgery.  # anemia of CKD. epogen on HD   # CKDMBD. resume renvela . hypercalcemia. start sensipar 30mg daily  # hyponatremia- fluid restriction 1.2 L/DAY

## 2024-02-05 LAB
ANION GAP SERPL CALC-SCNC: 9 MMOL/L — SIGNIFICANT CHANGE UP (ref 5–17)
BUN SERPL-MCNC: 48 MG/DL — HIGH (ref 7–18)
CALCIUM SERPL-MCNC: 10.4 MG/DL — SIGNIFICANT CHANGE UP (ref 8.4–10.5)
CHLORIDE SERPL-SCNC: 92 MMOL/L — LOW (ref 96–108)
CO2 SERPL-SCNC: 27 MMOL/L — SIGNIFICANT CHANGE UP (ref 22–31)
CREAT SERPL-MCNC: 12.1 MG/DL — HIGH (ref 0.5–1.3)
EGFR: 5 ML/MIN/1.73M2 — LOW
GLUCOSE SERPL-MCNC: 95 MG/DL — SIGNIFICANT CHANGE UP (ref 70–99)
HCT VFR BLD CALC: 26.4 % — LOW (ref 39–50)
HGB BLD-MCNC: 8.6 G/DL — LOW (ref 13–17)
MAGNESIUM SERPL-MCNC: 2.1 MG/DL — SIGNIFICANT CHANGE UP (ref 1.6–2.6)
MCHC RBC-ENTMCNC: 32.6 GM/DL — SIGNIFICANT CHANGE UP (ref 32–36)
MCHC RBC-ENTMCNC: 33.1 PG — SIGNIFICANT CHANGE UP (ref 27–34)
MCV RBC AUTO: 101.5 FL — HIGH (ref 80–100)
NRBC # BLD: 0 /100 WBCS — SIGNIFICANT CHANGE UP (ref 0–0)
PHOSPHATE SERPL-MCNC: 8.1 MG/DL — HIGH (ref 2.5–4.5)
PLATELET # BLD AUTO: 157 K/UL — SIGNIFICANT CHANGE UP (ref 150–400)
POTASSIUM SERPL-MCNC: 4.7 MMOL/L — SIGNIFICANT CHANGE UP (ref 3.5–5.3)
POTASSIUM SERPL-SCNC: 4.7 MMOL/L — SIGNIFICANT CHANGE UP (ref 3.5–5.3)
RBC # BLD: 2.6 M/UL — LOW (ref 4.2–5.8)
RBC # FLD: 13.9 % — SIGNIFICANT CHANGE UP (ref 10.3–14.5)
SODIUM SERPL-SCNC: 128 MMOL/L — LOW (ref 135–145)
WBC # BLD: 6.5 K/UL — SIGNIFICANT CHANGE UP (ref 3.8–10.5)
WBC # FLD AUTO: 6.5 K/UL — SIGNIFICANT CHANGE UP (ref 3.8–10.5)

## 2024-02-05 PROCEDURE — 99233 SBSQ HOSP IP/OBS HIGH 50: CPT

## 2024-02-05 RX ADMIN — CINACALCET 30 MILLIGRAM(S): 30 TABLET, FILM COATED ORAL at 14:13

## 2024-02-05 RX ADMIN — FLUPHENAZINE HYDROCHLORIDE 5 MILLIGRAM(S): 1 TABLET, FILM COATED ORAL at 14:12

## 2024-02-05 RX ADMIN — HEPARIN SODIUM 5000 UNIT(S): 5000 INJECTION INTRAVENOUS; SUBCUTANEOUS at 05:13

## 2024-02-05 RX ADMIN — HALOPERIDOL DECANOATE 2 MILLIGRAM(S): 100 INJECTION INTRAMUSCULAR at 21:20

## 2024-02-05 RX ADMIN — SEVELAMER CARBONATE 800 MILLIGRAM(S): 2400 POWDER, FOR SUSPENSION ORAL at 14:13

## 2024-02-05 RX ADMIN — ATORVASTATIN CALCIUM 40 MILLIGRAM(S): 80 TABLET, FILM COATED ORAL at 21:20

## 2024-02-05 RX ADMIN — HYDROMORPHONE HYDROCHLORIDE 2 MILLIGRAM(S): 2 INJECTION INTRAMUSCULAR; INTRAVENOUS; SUBCUTANEOUS at 05:57

## 2024-02-05 RX ADMIN — CARVEDILOL PHOSPHATE 25 MILLIGRAM(S): 80 CAPSULE, EXTENDED RELEASE ORAL at 18:53

## 2024-02-05 RX ADMIN — HEPARIN SODIUM 5000 UNIT(S): 5000 INJECTION INTRAVENOUS; SUBCUTANEOUS at 21:21

## 2024-02-05 RX ADMIN — MIRTAZAPINE 15 MILLIGRAM(S): 45 TABLET, ORALLY DISINTEGRATING ORAL at 21:20

## 2024-02-05 RX ADMIN — SEVELAMER CARBONATE 800 MILLIGRAM(S): 2400 POWDER, FOR SUSPENSION ORAL at 21:21

## 2024-02-05 RX ADMIN — HEPARIN SODIUM 5000 UNIT(S): 5000 INJECTION INTRAVENOUS; SUBCUTANEOUS at 14:14

## 2024-02-05 RX ADMIN — MONTELUKAST 10 MILLIGRAM(S): 4 TABLET, CHEWABLE ORAL at 14:12

## 2024-02-05 RX ADMIN — HYDROMORPHONE HYDROCHLORIDE 2 MILLIGRAM(S): 2 INJECTION INTRAMUSCULAR; INTRAVENOUS; SUBCUTANEOUS at 05:13

## 2024-02-05 RX ADMIN — AMLODIPINE BESYLATE 5 MILLIGRAM(S): 2.5 TABLET ORAL at 05:13

## 2024-02-05 RX ADMIN — RISPERIDONE 2 MILLIGRAM(S): 4 TABLET ORAL at 05:13

## 2024-02-05 RX ADMIN — TIOTROPIUM BROMIDE 2 PUFF(S): 18 CAPSULE ORAL; RESPIRATORY (INHALATION) at 14:17

## 2024-02-05 RX ADMIN — QUETIAPINE FUMARATE 400 MILLIGRAM(S): 200 TABLET, FILM COATED ORAL at 21:21

## 2024-02-05 RX ADMIN — RISPERIDONE 2 MILLIGRAM(S): 4 TABLET ORAL at 18:53

## 2024-02-05 RX ADMIN — ERYTHROPOIETIN 4000 UNIT(S): 10000 INJECTION, SOLUTION INTRAVENOUS; SUBCUTANEOUS at 11:40

## 2024-02-05 RX ADMIN — BUDESONIDE AND FORMOTEROL FUMARATE DIHYDRATE 2 PUFF(S): 160; 4.5 AEROSOL RESPIRATORY (INHALATION) at 14:11

## 2024-02-05 RX ADMIN — LIDOCAINE 1 PATCH: 4 CREAM TOPICAL at 08:38

## 2024-02-05 RX ADMIN — CARVEDILOL PHOSPHATE 25 MILLIGRAM(S): 80 CAPSULE, EXTENDED RELEASE ORAL at 05:13

## 2024-02-05 RX ADMIN — LIDOCAINE 1 PATCH: 4 CREAM TOPICAL at 07:37

## 2024-02-05 RX ADMIN — SEVELAMER CARBONATE 800 MILLIGRAM(S): 2400 POWDER, FOR SUSPENSION ORAL at 05:14

## 2024-02-05 RX ADMIN — ESCITALOPRAM OXALATE 10 MILLIGRAM(S): 10 TABLET, FILM COATED ORAL at 14:11

## 2024-02-05 NOTE — PROGRESS NOTE ADULT - ASSESSMENT
post op lap javier,   drain with serous drainage  c/o pain, no surgical site pain identified    medial follow up  observation  d/c planning after above

## 2024-02-05 NOTE — PROGRESS NOTE ADULT - ASSESSMENT
Patient is a 43y Male with ESRD on HD MWF at [facility]. Was discharged from this hospital yesterday after admission with acute pancreatitis, s/p robotic lap javier. He is not able to ambulate thus returned to ED for placement to rehab. He was dialysed here yesterday  c/o abdominal pain   labs are pending    # ESRD. S/P  HD earlier today as got IV contrast yesterday. cont HD MWF  # s/p lap javier. surg F/U  # anemia of CKD. epogen on HD   # CKDMBD. cont  renvela . hypercalcemia, cont sensipar 30mg daily  # hyponatremia- fluid restriction 1.2 L/DAY  placement to [facility].

## 2024-02-05 NOTE — PROGRESS NOTE ADULT - ASSESSMENT
I have discussed case with surgeon providers, Souleymane Moore and Etelvina Kaplan.     I have personally reviewed CTA chest. Motion degraded exam, but no evidence of PE in right and left PA/major branches. There is report of cholelithiasis on this scan, and yet GB was removed 1/30. I called to discuss with Dr. Herrmann of Radiology, suspects this may be fluid collection/staples or suture material .    I have reviewed his CBC, BMP. Hgb stable, 8.6 g/dL. No leukocytosis. Na low at 129. K 4.2. Phos elevated at 8.      Assessment and Plan:     Mr. Mace is a 43 year old M with PMH HTN, schizophrenia, COPD (4L home O2), and ESRD on HD (MWF via Rt lower AVF ), along with recent admission for gallstone pancreatitis s/p CCY with DC on 2/2/24 presenting to the ED with a one day history of dyspnea and pleuritic chest pain as well as a leaking abdominal catheter.     Regarding his dyspnea and pleuritic right sided chest pain, most concerning on the differential would include acute PE. He is not requiring any increase in his supplemental oxygen and is not tachycardic, but WELLS score is 4.5 if placing PE as the most or equally most probably first diagnosis. His [place] score 2 points. So various scoring systems would place him at low risk or moderate risk, but when combining his presentation with the positive D-dimer, certainly reasonable to rule out PE. This will be complicated given his ESRD with CXR concerning for a degree of pulm edema.     CTA is negative, albeit not a perfect scan.     #Acute on Chronic Dyspnea, CTA negative for PE  #Pleuritic Chest Pain  #Chronic Hypoxic Respiratory Failure on 4 L Home Oxygen  #COPD  #ESRD on HD MWF  #Mild Pulmonary Edema   #Leaking Abdominal Surgical Drain     Recommendations:  -consider RUQ US to reconcile CT report from 2/4 as above  -agree with Pain consult   -CTA PE completed, no evidence to suggest starting therapeutic AC  -Nephrology consult for ESRD and dialysis, HD three times weekly  -given macrocytic anemia, would consider B12 and folate levels; we do not routinely check these in hospital but given presence of macrocytosis and severity of anemia, would consider  -do not feel patient has a post-operative pneumonia: there is no leukocytosis, productive cough, focal consolidation: would continue same antibiotics patient was just discharged with: ciprofloxacin 500 mg (dosed once daily given patient's Cr clearance) and metronidazole 500 mg three times daily  -PT consultation, likely to DC with rehab after this hospitalization  -abdominal drain management per surgery  -can decrease about of supplemental oxygen supplied to target O2 sat 88-92%; patient has chart diagnosis of COPD, not sure if he has had PFTs or not, but would not want to consistently saturate 100% in patient with COPD        Thank you for this consult. Internal Medicine will continue to follow. Please feel free to reach me on [employer] Teams any time.

## 2024-02-06 ENCOUNTER — TRANSCRIPTION ENCOUNTER (OUTPATIENT)
Age: 44
End: 2024-02-06

## 2024-02-06 VITALS
TEMPERATURE: 98 F | RESPIRATION RATE: 18 BRPM | SYSTOLIC BLOOD PRESSURE: 147 MMHG | OXYGEN SATURATION: 99 % | HEART RATE: 67 BPM | DIASTOLIC BLOOD PRESSURE: 75 MMHG

## 2024-02-06 LAB
ANION GAP SERPL CALC-SCNC: 6 MMOL/L — SIGNIFICANT CHANGE UP (ref 5–17)
BUN SERPL-MCNC: 35 MG/DL — HIGH (ref 7–18)
CALCIUM SERPL-MCNC: 10.2 MG/DL — SIGNIFICANT CHANGE UP (ref 8.4–10.5)
CHLORIDE SERPL-SCNC: 95 MMOL/L — LOW (ref 96–108)
CO2 SERPL-SCNC: 28 MMOL/L — SIGNIFICANT CHANGE UP (ref 22–31)
CREAT SERPL-MCNC: 9.44 MG/DL — HIGH (ref 0.5–1.3)
EGFR: 7 ML/MIN/1.73M2 — LOW
GLUCOSE SERPL-MCNC: 107 MG/DL — HIGH (ref 70–99)
HCT VFR BLD CALC: 26.6 % — LOW (ref 39–50)
HGB BLD-MCNC: 8.6 G/DL — LOW (ref 13–17)
MCHC RBC-ENTMCNC: 32.3 GM/DL — SIGNIFICANT CHANGE UP (ref 32–36)
MCHC RBC-ENTMCNC: 33.6 PG — SIGNIFICANT CHANGE UP (ref 27–34)
MCV RBC AUTO: 103.9 FL — HIGH (ref 80–100)
NRBC # BLD: 0 /100 WBCS — SIGNIFICANT CHANGE UP (ref 0–0)
PLATELET # BLD AUTO: 152 K/UL — SIGNIFICANT CHANGE UP (ref 150–400)
POTASSIUM SERPL-MCNC: 4.4 MMOL/L — SIGNIFICANT CHANGE UP (ref 3.5–5.3)
POTASSIUM SERPL-SCNC: 4.4 MMOL/L — SIGNIFICANT CHANGE UP (ref 3.5–5.3)
RBC # BLD: 2.56 M/UL — LOW (ref 4.2–5.8)
RBC # FLD: 13.8 % — SIGNIFICANT CHANGE UP (ref 10.3–14.5)
SODIUM SERPL-SCNC: 129 MMOL/L — LOW (ref 135–145)
WBC # BLD: 5.6 K/UL — SIGNIFICANT CHANGE UP (ref 3.8–10.5)
WBC # FLD AUTO: 5.6 K/UL — SIGNIFICANT CHANGE UP (ref 3.8–10.5)

## 2024-02-06 PROCEDURE — 85730 THROMBOPLASTIN TIME PARTIAL: CPT

## 2024-02-06 PROCEDURE — 82962 GLUCOSE BLOOD TEST: CPT

## 2024-02-06 PROCEDURE — 83690 ASSAY OF LIPASE: CPT

## 2024-02-06 PROCEDURE — 80053 COMPREHEN METABOLIC PANEL: CPT

## 2024-02-06 PROCEDURE — 82550 ASSAY OF CK (CPK): CPT

## 2024-02-06 PROCEDURE — 84100 ASSAY OF PHOSPHORUS: CPT

## 2024-02-06 PROCEDURE — 82009 KETONE BODYS QUAL: CPT

## 2024-02-06 PROCEDURE — 96374 THER/PROPH/DIAG INJ IV PUSH: CPT

## 2024-02-06 PROCEDURE — 99261: CPT

## 2024-02-06 PROCEDURE — 96375 TX/PRO/DX INJ NEW DRUG ADDON: CPT

## 2024-02-06 PROCEDURE — 94640 AIRWAY INHALATION TREATMENT: CPT

## 2024-02-06 PROCEDURE — 99285 EMERGENCY DEPT VISIT HI MDM: CPT | Mod: 25

## 2024-02-06 PROCEDURE — 71045 X-RAY EXAM CHEST 1 VIEW: CPT

## 2024-02-06 PROCEDURE — 83735 ASSAY OF MAGNESIUM: CPT

## 2024-02-06 PROCEDURE — 85610 PROTHROMBIN TIME: CPT

## 2024-02-06 PROCEDURE — 84484 ASSAY OF TROPONIN QUANT: CPT

## 2024-02-06 PROCEDURE — 99232 SBSQ HOSP IP/OBS MODERATE 35: CPT

## 2024-02-06 PROCEDURE — 85025 COMPLETE CBC W/AUTO DIFF WBC: CPT

## 2024-02-06 PROCEDURE — 85379 FIBRIN DEGRADATION QUANT: CPT

## 2024-02-06 PROCEDURE — 85027 COMPLETE CBC AUTOMATED: CPT

## 2024-02-06 PROCEDURE — 93005 ELECTROCARDIOGRAM TRACING: CPT

## 2024-02-06 PROCEDURE — 36415 COLL VENOUS BLD VENIPUNCTURE: CPT

## 2024-02-06 PROCEDURE — 80048 BASIC METABOLIC PNL TOTAL CA: CPT

## 2024-02-06 PROCEDURE — 71275 CT ANGIOGRAPHY CHEST: CPT

## 2024-02-06 RX ORDER — CHLORHEXIDINE GLUCONATE 213 G/1000ML
1 SOLUTION TOPICAL
Refills: 0 | Status: DISCONTINUED | OUTPATIENT
Start: 2024-02-06 | End: 2024-02-06

## 2024-02-06 RX ORDER — LIDOCAINE HYDROCHLORIDE AND EPINEPHRINE 10; 10 MG/ML; UG/ML
20 INJECTION, SOLUTION INFILTRATION; PERINEURAL ONCE
Refills: 0 | Status: DISCONTINUED | OUTPATIENT
Start: 2024-02-06 | End: 2024-02-06

## 2024-02-06 RX ADMIN — FLUPHENAZINE HYDROCHLORIDE 5 MILLIGRAM(S): 1 TABLET, FILM COATED ORAL at 12:11

## 2024-02-06 RX ADMIN — CARVEDILOL PHOSPHATE 25 MILLIGRAM(S): 80 CAPSULE, EXTENDED RELEASE ORAL at 06:03

## 2024-02-06 RX ADMIN — RISPERIDONE 2 MILLIGRAM(S): 4 TABLET ORAL at 06:03

## 2024-02-06 RX ADMIN — MONTELUKAST 10 MILLIGRAM(S): 4 TABLET, CHEWABLE ORAL at 12:10

## 2024-02-06 RX ADMIN — HEPARIN SODIUM 5000 UNIT(S): 5000 INJECTION INTRAVENOUS; SUBCUTANEOUS at 06:03

## 2024-02-06 RX ADMIN — CINACALCET 30 MILLIGRAM(S): 30 TABLET, FILM COATED ORAL at 12:12

## 2024-02-06 RX ADMIN — HEPARIN SODIUM 5000 UNIT(S): 5000 INJECTION INTRAVENOUS; SUBCUTANEOUS at 14:26

## 2024-02-06 RX ADMIN — SEVELAMER CARBONATE 800 MILLIGRAM(S): 2400 POWDER, FOR SUSPENSION ORAL at 06:03

## 2024-02-06 RX ADMIN — TIOTROPIUM BROMIDE 2 PUFF(S): 18 CAPSULE ORAL; RESPIRATORY (INHALATION) at 12:13

## 2024-02-06 RX ADMIN — BUDESONIDE AND FORMOTEROL FUMARATE DIHYDRATE 2 PUFF(S): 160; 4.5 AEROSOL RESPIRATORY (INHALATION) at 12:09

## 2024-02-06 RX ADMIN — SEVELAMER CARBONATE 800 MILLIGRAM(S): 2400 POWDER, FOR SUSPENSION ORAL at 14:27

## 2024-02-06 RX ADMIN — AMLODIPINE BESYLATE 5 MILLIGRAM(S): 2.5 TABLET ORAL at 06:03

## 2024-02-06 RX ADMIN — ESCITALOPRAM OXALATE 10 MILLIGRAM(S): 10 TABLET, FILM COATED ORAL at 12:09

## 2024-02-06 NOTE — DISCHARGE NOTE NURSING/CASE MANAGEMENT/SOCIAL WORK - NSDCPEFALRISK_GEN_ALL_CORE
For information on Fall & Injury Prevention, visit: [email or web address] OR  [email or web address] OR  https://www.cdc.gov/steadi/patient.html

## 2024-02-06 NOTE — DISCHARGE NOTE NURSING/CASE MANAGEMENT/SOCIAL WORK - PATIENT PORTAL LINK FT
You can access the FollowMyHealth Patient Portal offered by [facility] by registering at the following website: [email or web address]. By joining [email or web address] [email or web address] portal, you will also be able to view your health information using other applications (apps) compatible with our system.

## 2024-02-06 NOTE — PROGRESS NOTE ADULT - SUBJECTIVE AND OBJECTIVE BOX
[facility] : Progress Note :: [phone], (office [phone]),   Dr La / Dr Hui / Dr Barber / Dr Villalobos / Dr Hang CASTELLANO / Dr Mehta / Dr Sanchez / Dr Alber dumont  _____________________________________________________________________________________________  s/p CTA  s/p HD earlier today.    aspirin (Swelling)  penicillins (Swelling)  penicillin (Anaphylaxis)  shellfish (Anaphylaxis)  [place] (Anaphylaxis)  Seafood (Hives)  fish (Unknown)  shellfish (Rash)    Hospital Medications:   MEDICATIONS  (STANDING):  amLODIPine   Tablet 5 milliGRAM(s) Oral daily  atorvastatin 40 milliGRAM(s) Oral at bedtime  budesonide 160 MICROgram(s)/formoterol 4.5 MICROgram(s) Inhaler 2 Puff(s) Inhalation two times a day  carvedilol 25 milliGRAM(s) Oral every 12 hours  cinacalcet 30 milliGRAM(s) Oral daily  epoetin cyndie (PROCRIT) Injectable 4000 Unit(s) IV Push <User Schedule>  escitalopram 10 milliGRAM(s) Oral daily  fluPHENAZine 5 milliGRAM(s) Oral daily  haloperidol     Tablet 2 milliGRAM(s) Oral at bedtime  heparin   Injectable 5000 Unit(s) SubCutaneous every 8 hours  mirtazapine 15 milliGRAM(s) Oral at bedtime  montelukast 10 milliGRAM(s) Oral daily  QUEtiapine 400 milliGRAM(s) Oral at bedtime  risperiDONE   Tablet 2 milliGRAM(s) Oral two times a day  sevelamer carbonate 800 milliGRAM(s) Oral three times a day  tiotropium 2.5 MICROgram(s) Inhaler 2 Puff(s) Inhalation daily        VITALS:  T(F): 97.9 (02-05-24 @ 13:15), Max: 99.4 (02-04-24 @ 19:54)  HR: 68 (02-05-24 @ 13:15)  BP: 138/81 (02-05-24 @ 13:15)  RR: 18 (02-05-24 @ 13:15)  SpO2: 97% (02-05-24 @ 13:15)  Wt(kg): --    02-04 @ 07:01  -  02-05 @ 07:00  --------------------------------------------------------  IN: 0 mL / OUT: 695 mL / NET: -695 mL    02-05 @ 07:01  -  02-05 @ 18:55  --------------------------------------------------------  IN: 535 mL / OUT: 4135 mL / NET: -3600 mL        PHYSICAL EXAM:  Constitutional: obese  HEENT: anicteric sclera, oropharynx clear  Neck: No JVD  Respiratory: CTAB, no wheezes, rales or rhonchi  Cardiovascular: S1, S2, RRR  Gastrointestinal: BS+, soft, NT/ND  Extremities:  No peripheral edema  Neurological: A/O x 3, no focal deficits.  Vascular Access: AVF with thrill and bruit     LABS:  02-05    128<L>  |  92<L>  |  48<H>  ----------------------------<  95  4.7   |  27  |  12.10<H>    Ca    10.4      05 Feb 2024 10:34  Phos  8.1     02-05  Mg     2.1     02-05      Creatinine Trend: 12.10 <--, 10.30 <--, 9.23 <--, 9.46 <--, 9.49 <--, 7.82 <--                        8.6    6.50  )-----------( 157      ( 05 Feb 2024 10:34 )             26.4     Urine Studies:  Urinalysis Basic - ( 05 Feb 2024 10:34 )    Color:  / Appearance:  / SG:  / pH:   Gluc: 95 mg/dL / Ketone:   / Bili:  / Urobili:    Blood:  / Protein:  / Nitrite:    Leuk Esterase:  / RBC:  / WBC    Sq Epi:  / Non Sq Epi:  / Bacteria:         RADIOLOGY & ADDITIONAL STUDIES:  
INTERVAL HPI/OVERNIGHT EVENTS:  Pt resting comfortably. No acute complaints. Endorses some nausea. Denies loss of appetite. Denies abd pain, denies nausea, vomiting, fever, chills.     MEDICATIONS  (STANDING):  amLODIPine   Tablet 5 milliGRAM(s) Oral daily  atorvastatin 40 milliGRAM(s) Oral at bedtime  budesonide 160 MICROgram(s)/formoterol 4.5 MICROgram(s) Inhaler 2 Puff(s) Inhalation two times a day  carvedilol 25 milliGRAM(s) Oral every 12 hours  chlorhexidine 2% Cloths 1 Application(s) Topical <User Schedule>  cinacalcet 30 milliGRAM(s) Oral daily  epoetin cyndie (PROCRIT) Injectable 4000 Unit(s) IV Push <User Schedule>  escitalopram 10 milliGRAM(s) Oral daily  fluPHENAZine 5 milliGRAM(s) Oral daily  haloperidol     Tablet 2 milliGRAM(s) Oral at bedtime  heparin   Injectable 5000 Unit(s) SubCutaneous every 8 hours  mirtazapine 15 milliGRAM(s) Oral at bedtime  montelukast 10 milliGRAM(s) Oral daily  QUEtiapine 400 milliGRAM(s) Oral at bedtime  risperiDONE   Tablet 2 milliGRAM(s) Oral two times a day  sevelamer carbonate 800 milliGRAM(s) Oral three times a day  tiotropium 2.5 MICROgram(s) Inhaler 2 Puff(s) Inhalation daily    MEDICATIONS  (PRN):  cyclobenzaprine 5 milliGRAM(s) Oral three times a day PRN Muscle Spasm  HYDROmorphone   Tablet 2 milliGRAM(s) Oral every 6 hours PRN Severe Pain (7 - 10)      Vital Signs Last 24 Hrs  T(C): 36.6 (06 Feb 2024 05:30), Max: 36.7 (05 Feb 2024 10:15)  T(F): 97.9 (06 Feb 2024 05:30), Max: 98.1 (05 Feb 2024 10:15)  HR: 75 (06 Feb 2024 05:30) (68 - 75)  BP: 153/79 (06 Feb 2024 05:30) (138/81 - 159/81)  BP(mean): --  RR: 18 (06 Feb 2024 05:30) (18 - 18)  SpO2: 98% (06 Feb 2024 05:30) (97% - 100%)    Parameters below as of 05 Feb 2024 20:41  Patient On (Oxygen Delivery Method): room air        Physical:  General: A&Ox3. NAD.  Resp: Unlabored breathing. Equal chest rise bilaterally. on NC.   Abdomen: Obese, Soft nondistended, nontender to palpation. Dressing / steris clean dry, intact. MAURIZIO in place with SS output.     I&O's Detail    05 Feb 2024 07:01  -  06 Feb 2024 07:00  --------------------------------------------------------  IN:    Oral Fluid: 51 mL    Other (mL): 500 mL  Total IN: 551 mL    OUT:    Bulb (mL): 285 mL    Other (mL): 4000 mL  Total OUT: 4285 mL    Total NET: -3734 mL          LABS:                        8.6    5.60  )-----------( 152      ( 06 Feb 2024 05:32 )             26.6             02-06    129<L>  |  95<L>  |  35<H>  ----------------------------<  107<H>  4.4   |  28  |  9.44<H>    Ca    10.2      06 Feb 2024 05:32  Phos  8.1     02-05  Mg     2.1     02-05        43y.o. Male
S: Patient says right sided abdominal pain is a bit better, but feels he needs IV pain medications  Breathing comfortably  Case Management working on finding patient rehab for DC  I have called and discussed case with Souleymane Moore of Surgery     O:  Vital Signs Last 24 Hrs  T(C): 36.6 (05 Feb 2024 13:15), Max: 37.4 (04 Feb 2024 19:54)  T(F): 97.9 (05 Feb 2024 13:15), Max: 99.4 (04 Feb 2024 19:54)  HR: 68 (05 Feb 2024 13:15) (68 - 76)  BP: 138/81 (05 Feb 2024 13:15) (138/81 - 163/82)  BP(mean): 100 (05 Feb 2024 06:01) (100 - 109)  RR: 18 (05 Feb 2024 13:15) (18 - 18)  SpO2: 97% (05 Feb 2024 13:15) (95% - 98%)    Parameters below as of 05 Feb 2024 13:15  Patient On (Oxygen Delivery Method): nasal cannula  O2 Flow (L/min): 4      GENERAL: obese male resting comfortably in bed; on 4-5 L supplemental oxygen but saturating at or near 100%  HEAD:  Atraumatic, Normocephalic  EYES: , conjunctiva and sclera clear  NECK: Supple, No JVD  CHEST/LUNG: Clear to auscultation bilaterally; No wheeze  HEART: Regular rate and rhythm; No murmurs, rubs, or gallops  ABDOMEN: Soft, mildly tender in right upper quadrant, Nondistended; Bowel sounds present; MAURIZIO drain does not appear to be leaking  EXTREMITIES:  2+ Peripheral Pulses, No clubbing, cyanosis, or edema  PSYCH: AAOx3  NEUROLOGY: non-focal  SKIN: No rashes or lesions    amLODIPine   Tablet 5 milliGRAM(s) Oral daily  atorvastatin 40 milliGRAM(s) Oral at bedtime  budesonide 160 MICROgram(s)/formoterol 4.5 MICROgram(s) Inhaler 2 Puff(s) Inhalation two times a day  carvedilol 25 milliGRAM(s) Oral every 12 hours  cinacalcet 30 milliGRAM(s) Oral daily  cyclobenzaprine 5 milliGRAM(s) Oral three times a day PRN  epoetin cyndie (PROCRIT) Injectable 4000 Unit(s) IV Push <User Schedule>  escitalopram 10 milliGRAM(s) Oral daily  fluPHENAZine 5 milliGRAM(s) Oral daily  haloperidol     Tablet 2 milliGRAM(s) Oral at bedtime  heparin   Injectable 5000 Unit(s) SubCutaneous every 8 hours  HYDROmorphone   Tablet 2 milliGRAM(s) Oral every 6 hours PRN  mirtazapine 15 milliGRAM(s) Oral at bedtime  montelukast 10 milliGRAM(s) Oral daily  QUEtiapine 400 milliGRAM(s) Oral at bedtime  risperiDONE   Tablet 2 milliGRAM(s) Oral two times a day  sevelamer carbonate 800 milliGRAM(s) Oral three times a day  tiotropium 2.5 MICROgram(s) Inhaler 2 Puff(s) Inhalation daily                            8.6    6.50  )-----------( 157      ( 05 Feb 2024 10:34 )             26.4       02-05    128<L>  |  92<L>  |  48<H>  ----------------------------<  95  4.7   |  27  |  12.10<H>    Ca    10.4      05 Feb 2024 10:34  Phos  8.1     02-05  Mg     2.1     02-05    
[facility] : Progress Note :: [phone], (office [phone]),   Dr La / Dr Hui / Dr Barber / Dr Villalobos / Dr Hang CASTELLANO / Dr Mehta / Dr Sanchez / Dr Alber dumont  _____________________________________________________________________________________________  awaits CTA chest to R/O PE.    aspirin (Swelling)  penicillins (Swelling)  penicillin (Anaphylaxis)  shellfish (Anaphylaxis)  [place] (Anaphylaxis)  Seafood (Hives)  fish (Unknown)  shellfish (Rash)    Hospital Medications:   MEDICATIONS  (STANDING):  amLODIPine   Tablet 5 milliGRAM(s) Oral daily  atorvastatin 40 milliGRAM(s) Oral at bedtime  budesonide 160 MICROgram(s)/formoterol 4.5 MICROgram(s) Inhaler 2 Puff(s) Inhalation two times a day  carvedilol 25 milliGRAM(s) Oral every 12 hours  epoetin cyndie (PROCRIT) Injectable 4000 Unit(s) IV Push <User Schedule>  escitalopram 10 milliGRAM(s) Oral daily  fluPHENAZine 5 milliGRAM(s) Oral daily  haloperidol     Tablet 2 milliGRAM(s) Oral at bedtime  heparin   Injectable 5000 Unit(s) SubCutaneous every 8 hours  lidocaine   4% Patch 1 Patch Transdermal once  mirtazapine 15 milliGRAM(s) Oral at bedtime  montelukast 10 milliGRAM(s) Oral daily  QUEtiapine 400 milliGRAM(s) Oral at bedtime  risperiDONE   Tablet 2 milliGRAM(s) Oral two times a day  sevelamer carbonate 800 milliGRAM(s) Oral three times a day  alessia    VITALS:  T(F): 98.2 (02-04-24 @ 14:35), Max: 98.2 (02-04-24 @ 14:35)  HR: 73 (02-04-24 @ 14:35)  BP: 156/80 (02-04-24 @ 14:35)  RR: 18 (02-04-24 @ 14:35)  SpO2: 100% (02-04-24 @ 14:35)  Wt(kg): --    02-03 @ 07:01  -  02-04 @ 07:00  --------------------------------------------------------  IN: 0 mL / OUT: 280 mL / NET: -280 mL    02-04 @ 07:01  -  02-04 @ 19:41  --------------------------------------------------------  IN: 0 mL / OUT: 350 mL / NET: -350 mL        PHYSICAL EXAM:  Constitutional: NAD  HEENT: anicteric sclera, oropharynx clear.  Neck: No JVD  Respiratory: CTAB, no wheezes, rales or rhonchi  Cardiovascular: S1, S2, RRR  Gastrointestinal: BS+, J-P drain  Extremities:  No peripheral edema  Neurological: A/O x 3, no focal deficits  : No CVA tenderness. No hernandez.   Skin: No rashes  Vascular Access: Arterio-venous Fistula     LABS:  02-04    129<L>  |  94<L>  |  38<H>  ----------------------------<  103<H>  4.2   |  28  |  10.30<H>    Ca    10.6<H>      04 Feb 2024 05:29  Mg     2.0     02-03    TPro  7.3  /  Alb  3.1<L>  /  TBili  0.4  /  DBili      /  AST  23  /  ALT  40  /  AlkPhos  86  02-03    Creatinine Trend: 10.30 <--, 9.23 <--, 9.46 <--, 9.49 <--, 7.82 <--, 10.10 <--                        9.0    6.00  )-----------( 169      ( 04 Feb 2024 05:29 )             28.7     Urine Studies:  Urinalysis Basic - ( 04 Feb 2024 05:29 )    Color:  / Appearance:  / SG:  / pH:   Gluc: 103 mg/dL / Ketone:   / Bili:  / Urobili:    Blood:  / Protein:  / Nitrite:    Leuk Esterase:  / RBC:  / WBC    Sq Epi:  / Non Sq Epi:  / Bacteria:         RADIOLOGY & ADDITIONAL STUDIES:  
Patient seen and examined at bedside with no complaints.   Denies pain, nausea/ vomiting.   Tolerating diet.    Vital Signs Last 24 Hrs  T(F): 97.6 (02-04-24 @ 04:40), Max: 98.9 (02-03-24 @ 14:30)  HR: 74 (02-04-24 @ 04:40)  BP: 151/75 (02-04-24 @ 04:40)  RR: 18 (02-04-24 @ 04:40)  SpO2: 100% (02-04-24 @ 04:40)    GENERAL: Alert, NAD  CHEST/LUNG: respirations nonlabored  ABDOMEN: MAURIZIO with serous output: 280ml/24hours: drain dressing changed; soft, Nontender, Nondistended  EXTREMITIES:  no calf tenderness, No edema    I&O's Detail    03 Feb 2024 07:01  -  04 Feb 2024 07:00  --------------------------------------------------------  IN:  Total IN: 0 mL    OUT:    Bulb (mL): 280 mL  Total OUT: 280 mL    Total NET: -280 mL    LABS:                        9.0    6.00  )-----------( 169      ( 04 Feb 2024 05:29 )             28.7     02-04    129<L>  |  94<L>  |  38<H>  ----------------------------<  103<H>  4.2   |  28  |  10.30<H>    Ca    10.6<H>      04 Feb 2024 05:29  Mg     2.0     02-03    TPro  7.3  /  Alb  3.1<L>  /  TBili  0.4  /  DBili  x   /  AST  23  /  ALT  40  /  AlkPhos  86  02-03    PT/INR - ( 03 Feb 2024 13:48 )   PT: 10.7 sec;   INR: 0.94 ratio      PTT - ( 03 Feb 2024 13:48 )  PTT:29.1 sec    
Pt s- new complaints, mild pain at left lower pannus area. no n/v no cp   ICU Vital Signs Last 24 Hrs  T(C): 36.7 (05 Feb 2024 06:01), Max: 37.4 (04 Feb 2024 19:54)  T(F): 98 (05 Feb 2024 06:01), Max: 99.4 (04 Feb 2024 19:54)  HR: 70 (05 Feb 2024 06:01) (70 - 76)  BP: 151/74 (05 Feb 2024 06:01) (151/74 - 163/82)  BP(mean): 100 (05 Feb 2024 06:01) (100 - 109)  ABP: --  ABP(mean): --  RR: 18 (05 Feb 2024 06:01) (18 - 18)  SpO2: 98% (05 Feb 2024 06:01) (95% - 100%)    O2 Parameters below as of 05 Feb 2024 06:01  Patient On (Oxygen Delivery Method): nasal cannula  O2 Flow (L/min): 5      Alert nad  Abd: soft nt nd toi: serous drainage  no calf tenderness or etythema                          9.0    6.00  )-----------( 169      ( 04 Feb 2024 05:29 )             28.7   02-04    129<L>  |  94<L>  |  38<H>  ----------------------------<  103<H>  4.2   |  28  |  10.30<H>    Ca    10.6<H>      04 Feb 2024 05:29  Mg     2.0     02-03    TPro  7.3  /  Alb  3.1<L>  /  TBili  0.4  /  DBili  x   /  AST  23  /  ALT  40  /  AlkPhos  86  02-03  
PGY3 Progress Note discussed with attending     PAGER #: [[phone]] TILL 5:00 PM  PLEASE CONTACT ON CALL TEAM:  - On Call Team (Please refer to Janine) FROM 5:00 PM - 8:30PM  - Nightfloat Team FROM 8:30 -7:30 AM    CHIEF COMPLAINT & BRIEF HOSPITAL COURSE:  Mr. Mace is a 43 year old M with PMH HTN, schizophrenia, COPD (4L home O2), and ESRD on HD (MWF via Rt lower AVF ), along with recent admission for gallstone pancreatitis s/p CCY with DC on 2/2/24 presenting to the ED with a one day history of dyspnea and pleuritic chest pain as well as a leaking abdominal catheter. Patient initially admitted to Surgical service for evaluation of leaking MAURIZIO drain s/p lap cholecystectomy. Medicine consulted for co management and workup for pleuritic chest pain. Patient noted to have congestive findings on his chest xray.  EKG showing sinus rhythm with an old 1st degree block, no acute ischemic changes. Troponin was negative x2. CTA chest was negative for PE, but a limited study.       INTERVAL HPI/OVERNIGHT EVENTS: Patient still reports chest pain, worsened with deep breaths. Patient reports pain is 8/10 in severity. Patient also reports a lot of dyspepsia, denies nausea or vomiting. Patient denies fevers or chills.       REVIEW OF SYSTEMS:  CONSTITUTIONAL: No fever, weight loss, or fatigue  RESPIRATORY: No cough, wheezing, chills or hemoptysis; No shortness of breath  CARDIOVASCULAR: Has  chest pain, denies palpitations, dizziness, or leg swelling  GASTROINTESTINAL: No abdominal pain. No nausea, vomiting, or hematemesis; No diarrhea or constipation. No melena or hematochezia.  GENITOURINARY: No dysuria or hematuria, urinary frequency  NEUROLOGICAL: No headaches, memory loss, loss of strength, numbness, or tremors  SKIN: No itching, burning, rashes, or lesions     MEDICATIONS  (STANDING):  amLODIPine   Tablet 5 milliGRAM(s) Oral daily  atorvastatin 40 milliGRAM(s) Oral at bedtime  budesonide 160 MICROgram(s)/formoterol 4.5 MICROgram(s) Inhaler 2 Puff(s) Inhalation two times a day  carvedilol 25 milliGRAM(s) Oral every 12 hours  chlorhexidine 2% Cloths 1 Application(s) Topical <User Schedule>  cinacalcet 30 milliGRAM(s) Oral daily  epoetin cyndie (PROCRIT) Injectable 4000 Unit(s) IV Push <User Schedule>  escitalopram 10 milliGRAM(s) Oral daily  fluPHENAZine 5 milliGRAM(s) Oral daily  haloperidol     Tablet 2 milliGRAM(s) Oral at bedtime  heparin   Injectable 5000 Unit(s) SubCutaneous every 8 hours  mirtazapine 15 milliGRAM(s) Oral at bedtime  montelukast 10 milliGRAM(s) Oral daily  QUEtiapine 400 milliGRAM(s) Oral at bedtime  risperiDONE   Tablet 2 milliGRAM(s) Oral two times a day  sevelamer carbonate 800 milliGRAM(s) Oral three times a day  tiotropium 2.5 MICROgram(s) Inhaler 2 Puff(s) Inhalation daily    MEDICATIONS  (PRN):  cyclobenzaprine 5 milliGRAM(s) Oral three times a day PRN Muscle Spasm  HYDROmorphone   Tablet 2 milliGRAM(s) Oral every 6 hours PRN Severe Pain (7 - 10)    Vital Signs Last 24 Hrs  T(C): 36.6 (06 Feb 2024 05:30), Max: 36.7 (05 Feb 2024 10:15)  T(F): 97.9 (06 Feb 2024 05:30), Max: 98.1 (05 Feb 2024 10:15)  HR: 75 (06 Feb 2024 05:30) (68 - 75)  BP: 153/79 (06 Feb 2024 05:30) (138/81 - 159/81)  BP(mean): --  RR: 18 (06 Feb 2024 05:30) (18 - 18)  SpO2: 98% (06 Feb 2024 05:30) (97% - 100%)    Parameters below as of 05 Feb 2024 20:41  Patient On (Oxygen Delivery Method): room air        PHYSICAL EXAMINATION:  GENERAL: NAD, well built, saturating well on 4L NC   HEAD:  Atraumatic, Normocephalic  EYES:  conjunctiva and sclera clear  NECK: Supple  CHEST/LUNG: Clear to auscultation.  No rales, rhonchi, wheezing, or rubs  HEART: Regular rate and rhythm; No murmurs, rubs, or gallops, Chest pain reproducible on exam   ABDOMEN: Soft, Nontender, Nondistended; Bowel sounds present, MAURIZIO drain noted   NERVOUS SYSTEM:  Alert & Oriented X3,    EXTREMITIES:  2+ Peripheral Pulses, No clubbing, cyanosis, or edema  SKIN: warm dry                          8.6    5.60  )-----------( 152      ( 06 Feb 2024 05:32 )             26.6     02-06    129<L>  |  95<L>  |  35<H>  ----------------------------<  107<H>  4.4   |  28  |  9.44<H>    Ca    10.2      06 Feb 2024 05:32  Phos  8.1     02-05  Mg     2.1     02-05                CAPILLARY BLOOD GLUCOSE      RADIOLOGY & ADDITIONAL TESTS:                  
S: Patient is feeling about the same today as yesterday, still with right sided abdominal pain, worse with deep breath but also feels like it hurts to eat.     O:  Vital Signs Last 24 Hrs  T(C): 36.4 (04 Feb 2024 04:40), Max: 37.2 (03 Feb 2024 14:30)  T(F): 97.6 (04 Feb 2024 04:40), Max: 98.9 (03 Feb 2024 14:30)  HR: 74 (04 Feb 2024 04:40) (72 - 76)  BP: 151/75 (04 Feb 2024 04:40) (145/71 - 158/86)  BP(mean): 101 (04 Feb 2024 04:40) (101 - 101)  RR: 18 (04 Feb 2024 04:40) (18 - 18)  SpO2: 100% (04 Feb 2024 04:40) (96% - 100%)    Parameters below as of 04 Feb 2024 04:40  Patient On (Oxygen Delivery Method): nasal cannula  O2 Flow (L/min): 4      GENERAL: obese male sitting upright in bed, no acute distress  HEAD:  Atraumatic, Normocephalic  EYES: EOMI, PERRLA, conjunctiva and sclera clear  NECK: Supple, No JVD  CHEST/LUNG: breathing comfortably on 4 L nasal cannula and saturating 100%  HEART: Regular rate and rhythm; No murmurs, rubs, or gallops  ABDOMEN: Soft, Nontender, Nondistended  EXTREMITIES:  2+ Peripheral Pulses, trace pedal edema   PSYCH: AAOx3  NEUROLOGY: non-focal  SKIN: No rashes or lesions    amLODIPine   Tablet 5 milliGRAM(s) Oral daily  atorvastatin 40 milliGRAM(s) Oral at bedtime  budesonide 160 MICROgram(s)/formoterol 4.5 MICROgram(s) Inhaler 2 Puff(s) Inhalation two times a day  carvedilol 25 milliGRAM(s) Oral every 12 hours  cyclobenzaprine 5 milliGRAM(s) Oral three times a day PRN  epoetin cyndie (PROCRIT) Injectable 4000 Unit(s) IV Push <User Schedule>  escitalopram 10 milliGRAM(s) Oral daily  fluPHENAZine 5 milliGRAM(s) Oral daily  haloperidol     Tablet 2 milliGRAM(s) Oral at bedtime  heparin   Injectable 5000 Unit(s) SubCutaneous every 8 hours  HYDROmorphone   Tablet 2 milliGRAM(s) Oral every 6 hours PRN  mirtazapine 15 milliGRAM(s) Oral at bedtime  montelukast 10 milliGRAM(s) Oral daily  QUEtiapine 400 milliGRAM(s) Oral at bedtime  risperiDONE   Tablet 2 milliGRAM(s) Oral two times a day  sevelamer carbonate 800 milliGRAM(s) Oral three times a day  tiotropium 2.5 MICROgram(s) Inhaler 2 Puff(s) Inhalation daily                            9.0    6.00  )-----------( 169      ( 04 Feb 2024 05:29 )             28.7       02-04    129<L>  |  94<L>  |  38<H>  ----------------------------<  103<H>  4.2   |  28  |  10.30<H>    Ca    10.6<H>      04 Feb 2024 05:29  Mg     2.0     02-03    TPro  7.3  /  Alb  3.1<L>  /  TBili  0.4  /  DBili  x   /  AST  23  /  ALT  40  /  AlkPhos  86  02-03

## 2024-02-06 NOTE — DISCHARGE NOTE PROVIDER - NSDCCPCAREPLAN_GEN_ALL_CORE_FT
PRINCIPAL DISCHARGE DIAGNOSIS  Diagnosis: Shortness of breath  Assessment and Plan of Treatment: please follow up with your medical doctor and your nephrologist as directed.   Follow up with your surgeon in 2-3 weeks.

## 2024-02-06 NOTE — PROGRESS NOTE ADULT - ATTENDING COMMENTS
43 year old M with PMH HTN, schizophrenia, COPD (4L home O2), and ESRD on HD (MWF via Rt lower AVF ), along with recent admission for gallstone pancreatitis s/p CCY with DC on 2/2/24 who p/w with one day hx of dyspnea and pleuritic chest pain as well as a leaking abdominal catheter. CTA chest obtained, although not a good study as motion degraded, was noted to be negative for PE in right and left PA/major branches. There was report of cholelithiasis on this scan, and yet GB was removed 1/30, per clarification with  with Dr. Herrmann of Radiology, suspects this may be fluid collection/staples or suture material.     Patient seen and examined at bedside. Still has chest pain, mainly on right side, feels it on his ribs. No fever, chills overnight, tolerating diet.  Vitals stable, afebrile. No leukocytosis, macrocytic anemia stable. Mild hyponatremia, nl K   Notable exam: R sided anterior ribs mild ttp     #Acute on Chronic Dyspnea, CTA negative for PE   #Pleuritic Chest Pain -poss costochondritis  #Chronic Hypoxic Respiratory Failure on 4 L Home Oxygen   #COPD   #ESRD on HD MWF   #Mild Pulmonary Edema    #Leaking Abdominal Surgical Drain      Recommendations:   -consider RUQ US to reconcile CT report from 2/4 as above   -pain control for possible costochondritis, with tylenol/lidocaine patches   -CTA PE completed, no evidence to suggest starting therapeutic AC   -Nephrology consult for ESRD and dialysis, HD three times weekly   -given macrocytic anemia, would consider B12 and folate levels; we do not routinely check these in hospital but given presence of macrocytosis and severity of anemia, would consider   -do not feel patient has a post-operative pneumonia: there is no leukocytosis, productive cough, focal consolidation: would continue same antibiotics patient was just discharged with: ciprofloxacin 500 mg (dosed once daily given patient's Cr clearance) and metronidazole 500 mg three times daily   -c/w home BP medications, psych medications  -PT consultation   -abdominal drain management per surgery   -may consider decreasing amount of supplemental oxygen supplied to target O2 sat 88-92%; patient has chart diagnosis of COPD, unclear if he has had PFTs or not, but would not want to consistently saturate 100% in patient with COPD

## 2024-02-06 NOTE — PROGRESS NOTE ADULT - ASSESSMENT
Mr. Mace is a 43 year old M with PMH HTN, schizophrenia, COPD (4L home O2), and ESRD on HD (MWF via Rt lower AVF ), along with recent admission for gallstone pancreatitis s/p CCY with DC on 2/2/24 presenting to the ED with a one day history of dyspnea and pleuritic chest pain as well as a leaking abdominal catheter. Patient initially admitted to Surgical service for evaluation of leaking MAURIZIO drain s/p lap cholecystectomy. Medicine consulted for co management and workup for pleuritic chest pain. Patient noted to have congestive findings on his chest xray.  EKG showing sinus rhythm with an old 1st degree block, no acute ischemic changes. Troponin was negative x2. CTA chest was negative for PE, but a limited study. Medicine following patient for medical co management and workup of pleuritic chest pain.     #Acute on Chronic Hypoxemia,   #Pleuritic Chest Pain  #Hx of COPD on 4 L Home Oxygen  #ESRD on HD MWF  #Mild Pulmonary Edema   #Leaking Abdominal Surgical Drain   # Chronic Anemia     #Acute on Chronic Hypoxemia  - (resolved), patient currently on home oxygen 4L   - Initially presenting with pleuritic chest pain   - CTA chest negative for PE, limited study  - no need for full dose anticoagulation   - no indication for post operative PNA, no leukocytosis, afebrile and productive cough, continue same antibiotics patient was discharged on cipro and flagyl     #Pleuritic Chest pain  - EKG noted with Sinus rhythm with old 1st degree AV block  - CXR showing congestive changes   - troponin negative x2  - CTA negative for PE, limited study   - chest pain reproducible on examination   - patient with dyspepsia   - can be component of costochondritis vs GI etiology, less likely cardiac in nature   - agree with Pain management consult  - consider Maalox, GI cocktail     #Hx of COPD   - currently on 4L home oxygen   - at baseline   - maintain oxygenation between 88-92%  - continue with spiriva and Symbicort    #ESRD   - patient with ESRD on HD (M,W,F)  - continue with Sevelamer    - HD as per Nephro   - Renal diet     #Leaking Abdominal drain  - management as per surgery reccs    #Hx of Chronic Anemia  - baseline hgb 8-9  -given macrocytic anemia, would consider B12 and folate levels; we do not routinely check these in hospital but given presence of macrocytosis and severity of anemia    # Prophylaxis   - SCD for DVT prophylaxis  Mr. Mace is a 43 year old M with PMH HTN, schizophrenia, COPD (4L home O2), and ESRD on HD (MWF via Rt lower AVF ), along with recent admission for gallstone pancreatitis s/p CCY with DC on 2/2/24 presenting to the ED with a one day history of dyspnea and pleuritic chest pain as well as a leaking abdominal catheter. Patient initially admitted to Surgical service for evaluation of leaking MAURIZIO drain s/p lap cholecystectomy. Medicine consulted for co management and workup for pleuritic chest pain. Patient noted to have congestive findings on his chest xray.  EKG showing sinus rhythm with an old 1st degree block, no acute ischemic changes. Troponin was negative x2. CTA chest was negative for PE, but a limited study. Medicine following patient for medical co management and workup of pleuritic chest pain.     #Acute on Chronic Hypoxemia,   #Pleuritic Chest Pain  #Hx of COPD on 4 L Home Oxygen  #ESRD on HD MWF  #Mild Pulmonary Edema   #Leaking Abdominal Surgical Drain   # Chronic Anemia     #Acute on Chronic Hypoxemia  - (resolved), patient currently on home oxygen 4L   - Initially presenting with pleuritic chest pain   - CTA chest negative for PE, limited study  - no need for full dose anticoagulation   - no indication for post operative PNA, no leukocytosis, afebrile and productive cough, continue same antibiotics patient was discharged on cipro and flagyl     #Pleuritic Chest pain  - EKG noted with Sinus rhythm with old 1st degree AV block  - CXR showing congestive changes   - troponin negative x2  - CTA negative for PE, limited study   - chest pain reproducible on examination   - patient with dyspepsia   - can be component of costochondritis vs GI etiology, less likely cardiac in nature   - agree with Pain management consult  - consider Lidocaine patch for costochondritis   - consider Maalox, GI cocktail     #Hx of COPD   - currently on 4L home oxygen   - at baseline   - maintain oxygenation between 88-92%  - continue with spiriva and Symbicort    #ESRD   - patient with ESRD on HD (M,W,F)  - continue with Sevelamer    - HD as per Nephro   - Renal diet     #Leaking Abdominal drain  - management as per surgery reccs    #Hx of Chronic Anemia  - baseline hgb 8-9  -given macrocytic anemia, would consider B12 and folate levels; we do not routinely check these in hospital but given presence of macrocytosis and severity of anemia    # Prophylaxis   - SCD for DVT prophylaxis  Mr. Mace is a 43 year old M with PMH HTN, schizophrenia, COPD (4L home O2), and ESRD on HD (MWF via Rt lower AVF ), along with recent admission for gallstone pancreatitis s/p CCY with DC on 2/2/24 presenting to the ED with a one day history of dyspnea and pleuritic chest pain as well as a leaking abdominal catheter. Patient initially admitted to Surgical service for evaluation of leaking MAURIZIO drain s/p lap cholecystectomy. Medicine consulted for co management and workup for pleuritic chest pain. Patient noted to have congestive findings on his chest xray.  EKG showing sinus rhythm with an old 1st degree block, no acute ischemic changes. Troponin was negative x2. CTA chest was negative for PE, but a limited study. Medicine following patient for medical co management and workup of pleuritic chest pain.     #Acute on Chronic Hypoxemia,   #Pleuritic Chest Pain  #Hx of COPD on 4 L Home Oxygen  #ESRD on HD MWF  #Mild Pulmonary Edema   #Leaking Abdominal Surgical Drain   # Chronic Anemia     #Acute on Chronic Hypoxemia  - (resolved), patient currently on home oxygen 4L   - Initially presenting with pleuritic chest pain   - CTA chest negative for PE, limited study  - no need for full dose anticoagulation   - no indication for post operative PNA, no leukocytosis, afebrile and productive cough, continue same antibiotics patient was discharged on cipro and flagyl     #Pleuritic Chest pain  - EKG noted with Sinus rhythm with old 1st degree AV block  - CXR showing congestive changes   - troponin negative x2  - CTA negative for PE, limited study   - chest pain reproducible on examination   - patient with dyspepsia   - can be component of costochondritis vs GI etiology, less likely cardiac in nature   - agree with Pain management consult  - consider Lidocaine patch for costochondritis   - consider Maalox, GI cocktail     #Hx of COPD   - currently on 4L home oxygen   - at baseline   - maintain oxygenation between 88-92%  - continue with spiriva and Symbicort    #ESRD   - patient with ESRD on HD (M,W,F)  - continue with Sevelamer    - HD as per Nephro   - Renal diet     #Leaking Abdominal drain  - management as per surgery reccs    #Hx of Chronic Anemia  - baseline hgb 8-9  -given macrocytic anemia, would consider B12 and folate levels; we do not routinely check these in hospital but given presence of macrocytosis and severity of anemia    # Prophylaxis   - SCD for DVT prophylaxis       Upon discharge no new medications from medical standpoint. Antibiotics as per surgery

## 2024-02-06 NOTE — DISCHARGE NOTE PROVIDER - CARE PROVIDER_API CALL
Luis Alberto Bueno [facility]  Surgery  [address] [place] [address]  Phone: [phone]  Fax: [phone]  Follow Up Time:

## 2024-02-06 NOTE — DISCHARGE NOTE NURSING/CASE MANAGEMENT/SOCIAL WORK - NSDCVIVACCINE_GEN_ALL_CORE_FT
influenza, injectable, quadrivalent, preservative free; 07-Apr-2016 12:13; Halle Ellsworth (RN); [employer]; [id number]; IntraMuscular; Deltoid Left.; 0.5 milliLiter(s); VIS (VIS Published: 07-Aug-2015, VIS Presented: 07-Apr-2016);   influenza, injectable, quadrivalent, preservative free; 06-Dec-2018 14:41; Rhonda Kern (RN); [employer]; [id number] (Exp. Date: 30-Jun-2019); IntraMuscular; Deltoid Right.; 0.5 milliLiter(s); VIS (VIS Published: 07-Aug-2015, VIS Presented: 06-Dec-2018);   influenza, injectable, quadrivalent, preservative free; 05-Mar-2019 16:51; Jace Best (RN); [employer]; [id number] (Exp. Date: 30-Jun-2019); IntraMuscular; Deltoid Right.; 0.5 milliLiter(s); VIS (VIS Published: 07-Aug-2015, VIS Presented: 05-Mar-2019);   influenza, injectable, quadrivalent, preservative free; 19-Dec-2020 12:14; Delmer Weinberg (RN); [employer]; [id number] (Exp. Date: 30-Jun-2021); IntraMuscular; Deltoid Right.; 0.5 milliLiter(s); VIS (VIS Published: 15-Aug-2019, VIS Presented: 19-Dec-2020);

## 2024-02-06 NOTE — DISCHARGE NOTE PROVIDER - HOSPITAL COURSE
Pt s/p lamar herrera on 1/30 for gallstone pancreatitis, discharged 2/2 from [facility], returned to the ED because of leaking around his MAURIZIO drain as well as dyspnea. Pt was restless last night because he was laying flat in his bed when he is "supposed to be at an incline." At time of this interview, he says dyspnea has been going on since last night. It is associated with right sided pleuritic chest pain "just under the rib cage." The pain is worse when he tried to take a deep breath. No fever/chills/headache/cough/sputum production. No hemoptysis. Pt has several chronic medical conditions including obesity, COPD, normally on 4 L of home oxygen, ESRD on HD MWF, he makes no urine. Pt was admitted to the hospital, underwent a CTA that was negtaive for PE. Pt was seen by nephrology, was recommended to resume his Renvela and start Sensipar, with fluid restriction to 1.2L/day. Pt was seen by internal medicine who had low suspicion for pneumonia, and recommended to continue the pts abx given upon discharge. Pt was seen by the surgeon and his MAURIZIO drain was removed at bedside. Pt was seen by case management, his home physical therapy was reinstated, and pt was stable for discharge to home.

## 2024-02-06 NOTE — DISCHARGE NOTE PROVIDER - NSDCMRMEDTOKEN_GEN_ALL_CORE_FT
acetaminophen 500 mg oral tablet: 2 tab(s) orally every 8 hours  AMLODIPINE 5MG TAB: 1 tab(s) orally once a day  ATORVASTATIN 40MG TAB: 1 tab(s) orally once a day  bisacodyl 5 mg oral delayed release tablet: 1 tab(s) orally once a day  budesonide/glycopyrrolate/formoterol 160 mcg-9 mcg-4.8 mcg/inh inhalation aerosol: 2 puff(s) inhaled 2 times a day  Calcitriol Oral Capsule 0.5 MC tab(s) orally once a day  CARVEDILOL 25MG TAB: 1 tab(s) orally 2 times a day  CINACALCET TABLET 30 M tab(s) orally once a day  ciprofloxacin 500 mg oral tablet: 1 tab(s) orally every 12 hours  cyclobenzaprine 5 mg oral tablet: 1 tab(s) orally 3 times a day MDD: 3 tablets  ESCITALOPRAM 10MG TAB: 1 tab(s) orally once a day  fluPHENAZine 5 mg oral tablet: 1 tab(s) orally 2 times a day  Haldol 2 mg oral tablet: 1 tab(s) orally once a day  hydrALAZINE 50 mg oral tablet: 1 tab(s) orally 3 times a day  hydrOXYzine hydrochloride 25 mg oral tablet: 1 tab(s) orally 2 times a day  metroNIDAZOLE 500 mg oral tablet: 1 tab(s) orally every 8 hours  MIRTAZAPINE 15MG TAB: 1 tab(s) orally once a day  MONTELUKAST 10MG TAB: tab(s) orally once a day  polyethylene glycol 3350 oral powder for reconstitution: 17 gram(s) orally once a day  risperiDONE 2 mg oral tablet: 1 tab(s) orally 2 times a day  senna leaf extract oral tablet: 2 tab(s) orally once a day (at bedtime)  Seroquel 100 mg oral tablet: 1 tab(s) orally 2 times a day  Seroquel 200 mg oral tablet: 2 tab(s) orally once a day (at bedtime)  SEVELAMER CARB  M each orally 3 times a day  SPIRIVA RESP 1.25ACT INH: 2 puff(s) inhaled 2 times a day  traZODone 150 mg oral tablet: 2 tab(s) orally once a day (at bedtime)  VENTOLIN  INH: 2 puff(s) inhaled 2 times a day

## 2024-02-06 NOTE — CHART NOTE - NSCHARTNOTEFT_GEN_A_CORE
MAURIZIO drain removed at bedside  Area anesthetized with 10cc of 1% lidocaine.   Surrounding skin prepped with chloroprep and draped; two interrupted 3-0 nylon sutures placed to close MAURIZIO site.  Pt tolerated well.

## 2024-02-06 NOTE — PROGRESS NOTE ADULT - ASSESSMENT
43 year old male with laparoscopic cholecystectomy 1/30    Plan  - social work/case management  - diet as tolerated  - discharge planning

## 2024-02-06 NOTE — DISCHARGE NOTE PROVIDER - NSDCFUSCHEDAPPT_GEN_ALL_CORE_FT
Luis Alberto Bueno  [facility] Physician [facility]  [address] [facility]  Scheduled Appointment: 02/08/2024

## 2024-02-08 NOTE — DIETITIAN INITIAL EVALUATION ADULT. - PROBLEM SELECTOR PLAN 9
08-Feb-2024
RISK                                                          Points  [] Previous VTE                                           3  [] Thrombophilia                                        2  [] Lower limb paralysis                              2   [] Current Cancer                                       2   [x] Immobilization > 24 hrs                        1  [] ICU/CCU stay > 24 hours                       1  [x] Age > 60                                                   1    sc heparin as esrd scd boots

## 2024-02-13 ENCOUNTER — APPOINTMENT (OUTPATIENT)
Dept: PAIN MANAGEMENT | Facility: CLINIC | Age: 44
End: 2024-02-13

## 2024-02-19 NOTE — PROGRESS NOTE ADULT - PROBLEM/PLAN-1
"Anesthesia Transfer of Care Note    Patient: Bessy Egan    Procedure(s) Performed: Procedure(s) (LRB):  COLONOSCOPY (N/A)    Patient location: PACU    Anesthesia Type: general    Transport from OR: Transported from OR on room air with adequate spontaneous ventilation    Post pain: adequate analgesia    Post assessment: no apparent anesthetic complications and tolerated procedure well    Post vital signs: stable    Level of consciousness: awake    Nausea/Vomiting: no nausea/vomiting    Complications: none    Transfer of care protocol was followed      Last vitals: Visit Vitals  BP (!) 160/95 (BP Location: Right arm, Patient Position: Sitting)   Pulse 75   Temp 36 °C (96.8 °F) (Temporal)   Resp 20   Ht 5' 9" (1.753 m)   Wt 84.7 kg (186 lb 11.7 oz)   SpO2 98%   Breastfeeding No   BMI 27.58 kg/m²     "
DISPLAY PLAN FREE TEXT

## 2024-02-28 DIAGNOSIS — Z01.818 ENCOUNTER FOR OTHER PREPROCEDURAL EXAMINATION: ICD-10-CM

## 2024-02-29 ENCOUNTER — NON-APPOINTMENT (OUTPATIENT)
Age: 44
End: 2024-02-29

## 2024-02-29 ENCOUNTER — APPOINTMENT (OUTPATIENT)
Dept: PAIN MANAGEMENT | Facility: CLINIC | Age: 44
End: 2024-02-29

## 2024-02-29 ENCOUNTER — APPOINTMENT (OUTPATIENT)
Dept: SURGERY | Facility: CLINIC | Age: 44
End: 2024-02-29

## 2024-03-06 NOTE — ED ADULT NURSE NOTE - NS ED NOTE ABUSE RESPONSE YN
Anesthesia Post-op Note    Patient: Any Santamaria  Procedure(s) Performed: EGD with biopsy  SIGMOIDOSCOPY, DIAGNOSTIC with biopsy  Anesthesia type: MAC    Vitals Value Taken Time   Temp 97.5 03/06/24 1338   Pulse 68 03/06/24 1338   Resp 17 03/06/24 1338   SpO2 100 03/06/24 1338   /66 03/06/24 1338   Vitals shown include unvalidated device data.      Patient Location: Day Surgery  Post-op Vital Signs:stable  Level of Consciousness: participates in exam, awake, oriented, alert and answers questions appropriately  Respiratory Status: spontaneous ventilation and room air  Cardiovascular blood pressure returned to baseline, hypertensive and stable (135 preop)  Hydration: euvolemic  Pain Management: adequately controlled  Handoff: Handoff to receiving clinician was performed and questions were answered  Vomiting: none  Nausea: None  Airway Patency:patent  Post-op Assessment: awake, alert, appropriately conversant, or baseline, no complications, patient tolerated procedure well and no evidence of recall  Comments: Mental status recovered: patient is awake and oriented, participates in evaluation, answer questions appropriately  VS noted, are stable, and are documented on anesthesia record  Respiratory function satisfactory; airway patent   Adequate pain control   Nausea and vomiting control satisfactory  No apparent anesthetic complications   Meets PACU discharge criteria        No notable events documented.                       Yes

## 2024-03-21 NOTE — CONSULT NOTE ADULT - NSPROBSELRECBLANK_5_GEN
BILL HAGAN  Claim Number: [id number]  Claim Status: claim open and in good standing  Current Work Status: full time occupation: ; off work due to current condition  Adjustor/: Dc  Phone number: [phone]   Fax:  [phone]     Yozons/Waste Mng  : Jodie   [phone]  
41.6
DISPLAY PLAN FREE TEXT

## 2024-03-24 NOTE — PATIENT PROFILE ADULT - BRADEN ACTIVITY
Alert-The patient is alert, awake and responds to voice. The patient is oriented to time, place, and person. The triage nurse is able to obtain subjective information.
(3) walks occasionally

## 2024-04-18 ENCOUNTER — APPOINTMENT (OUTPATIENT)
Dept: SURGERY | Facility: CLINIC | Age: 44
End: 2024-04-18
Payer: MEDICARE

## 2024-04-18 VITALS
OXYGEN SATURATION: 90 % | HEART RATE: 65 BPM | SYSTOLIC BLOOD PRESSURE: 122 MMHG | DIASTOLIC BLOOD PRESSURE: 74 MMHG | HEIGHT: 78 IN | BODY MASS INDEX: 36.45 KG/M2 | WEIGHT: 315 LBS

## 2024-04-18 PROCEDURE — 99024 POSTOP FOLLOW-UP VISIT: CPT

## 2024-04-22 NOTE — ED ADULT NURSE NOTE - PAIN: BODY LOCATION
Dr. Mcclendon    I called [facility] at [phone] and spoke to Dottie.    They do not have any esophageal motility specialists that work out of that location.  [facility] and she listed all of the providers:    Dr. Italo Frankel    If you would like patient to see one of those providers please place referral and I can fax it to [phone].    Per Dottie patient can call [phone] for an appointment.    Thank you     b/l legs

## 2024-04-23 NOTE — DISCHARGE NOTE NURSING/CASE MANAGEMENT/SOCIAL WORK - NSCORESITESY/N_GEN_A_CORE_RD
Yes Communicate risk of Fall with Harm to all staff, patient, and family/Provide visual cue: red socks, yellow wristband, yellow gown, etc/Reinforce activity limits and safety measures with patient and family/Bed in lowest position, wheels locked, appropriate side rails in place/Call bell, personal items and telephone in reach/Instruct patient to call for assistance before getting out of bed/chair/stretcher/Non-slip footwear applied when patient is off stretcher/[place] to call system/Physically safe environment - no spills, clutter or unnecessary equipment/Purposeful Proactive Rounding/Room/bathroom lighting operational, light cord in reach

## 2024-05-14 ENCOUNTER — APPOINTMENT (OUTPATIENT)
Dept: CARDIOLOGY | Facility: CLINIC | Age: 44
End: 2024-05-14

## 2024-05-16 ENCOUNTER — NON-APPOINTMENT (OUTPATIENT)
Age: 44
End: 2024-05-16

## 2024-05-30 ENCOUNTER — APPOINTMENT (OUTPATIENT)
Dept: SURGERY | Facility: CLINIC | Age: 44
End: 2024-05-30
Payer: MEDICARE

## 2024-05-30 VITALS
WEIGHT: 315 LBS | HEART RATE: 64 BPM | DIASTOLIC BLOOD PRESSURE: 73 MMHG | HEIGHT: 78 IN | OXYGEN SATURATION: 92 % | SYSTOLIC BLOOD PRESSURE: 144 MMHG | BODY MASS INDEX: 36.45 KG/M2

## 2024-05-30 DIAGNOSIS — E66.01 MORBID (SEVERE) OBESITY DUE TO EXCESS CALORIES: ICD-10-CM

## 2024-05-30 PROCEDURE — 99213 OFFICE O/P EST LOW 20 MIN: CPT

## 2024-06-04 NOTE — HISTORY OF PRESENT ILLNESS
[de-identified] : PRESTON JOHNSON is a 44 year old male with morbid obesity (BMI 41.8 kg/m2) who present in the office for pre-operative follow-up and weight management program in preparation for Robotic assisted Laparoscopic Sleeve Gastrectomy.  Patient is here for monthly weigh ins. Today's weight is 371 lb and BMI 41.8 kg/m2. Patient lost 35 lb since the beginning of the process. Today patient is doing well, offers no complaints. Patient results reviewed with the patient; patient verbalized the understanding. Patient will continue to obtain the clearances RTO next month.   -Nutrition: cleared to proceed  -Cardiology: pending clearance  -Pulmonary: pending clearance  -GI: EGD w/ Dr. Bueno, Negative for H. pylori.  -Psych: pending clearance list of providers given -PCP Letter: pending letter of support.

## 2024-06-04 NOTE — ASSESSMENT
[FreeTextEntry1] : PRESTON JOHNSON is a 43 year old male who underwent a Robotic-assisted laparoscopic cholecystectomy with cholangiography 01/30/2024. pathology results are consistent with Chronic cholecystitis.    Patient is doing well. All surgical incisions are healing well and as expected. There is no evidence of infection or complication, and patient is progressing as expected. Post-operative wound care, activity, restrictions and precautions reinforced.  Pathology results were discussed in detail. Patient was instructed no heavy lifting 2 to 4 weeks. Patient's questions and concerns addressed to patient's satisfaction.    Patient will continue to obtain the clearances.  Pending Clearances: Pulmonary,  Cardiologist   Psychology   PCP Letter of Support

## 2024-06-04 NOTE — ASSESSMENT
[FreeTextEntry1] : PRESTON JOHNSON is a 44 year old male with morbid obesity (BMI 41.8 kg/m2) who present in the office for pre-operative follow-up and weight management program in preparation for Robotic assisted Laparoscopic Sleeve Gastrectomy.    Patient is doing well, offers no complaints, he will continue to obtain the clearances.  Patient weight is 371 lb and BMI 41.8 kg/m2.    Pending Clearances: Pulmonary,  Cardiologist   Psychology   PCP Letter of Support   RTO post clearance and as needed

## 2024-06-04 NOTE — PLAN
[FreeTextEntry1] : Please follow up at the office post clearances and as needed for any questions or concerns

## 2024-06-04 NOTE — PHYSICAL EXAM
[Obese] : obese [Normal] : affect appropriate [de-identified] : Normoactive bowel sounds, soft and nontender, no hepatosplenomegaly or masses noted, well healed scars

## 2024-06-04 NOTE — PHYSICAL EXAM
[de-identified] : The patient is alert, well-groomed  [de-identified] : Normoactive bowel sounds, soft and nontender, no hepatosplenomegaly or masses noted, Incision sites are healing well. stitches were removed today [de-identified] : full range of motion and no deformities appreciated. walks with the cane

## 2024-06-04 NOTE — HISTORY OF PRESENT ILLNESS
[de-identified] : PRESTON JOHNSON is a 43 year old male who presents in the office for postop visit. Patient was admitted to [facility] and the [facility] with gallstone pancreatitis. He underwent a Robotic-assisted laparoscopic cholecystectomy with cholangiography 01/30/2024. pathology results are consistent with   Chronic cholecystitis. Patient was discharged to Rehab post surgery. Today patient is doing well, offers no complaints. Denies any fevers, chills, nausea, vomiting, diarrhea or constipation. Patient able to tolerate regular diet with normal bowel movements. Surgical incisions are healing well. No sign of inflammation or exudate. Patient is in weight management program in preparation for Robotic assisted Laparoscopic Sleeve Gastrectomy.  -Labs: next visit  -Nutrition: Cleared -Psych: pending clearance  -GI: Did his EGD w/ Dr. Bueno in December. Path revealed gastric antral mucosa with foveolar hyperplasia and changes of reactive gastropathy; negative for H. pylori.  -Pulmonary: pending clearance   -Cardiology: NPA w/ Dr. Galeana - 5/14.  -PCP Letter: pending -Weight Checks: completed

## 2024-06-06 NOTE — ED PROVIDER NOTE - DISPOSITION TYPE
Quality 226: Preventive Care And Screening: Tobacco Use: Screening And Cessation Intervention: Patient screened for tobacco use and is an ex/non-smoker Detail Level: Detailed Quality 431: Preventive Care And Screening: Unhealthy Alcohol Use - Screening: Patient not identified as an unhealthy alcohol user when screened for unhealthy alcohol use using a systematic screening method ADMIT

## 2024-06-20 NOTE — H&P ADULT - NSHPATTENDINGPLANDISCUSS_GEN_ALL_CORE
Vitals capture started with the following parameters, Patient=Adult, Interval=5 min, Initial Pressure=140 mmHg, Deflation Rate=5 mmHg, Cuff placed on Right Arm Dr. NESTOR Jean Baptiste ([phone])

## 2024-06-24 NOTE — ED ADULT NURSE NOTE - NSSUSCREENINGQ2_ED_ALL_ED
Pt given ativan at this time to aid with anxiety r/t numb legs from the epidural. Waiting on pharmacy to approve order for ambien.    No

## 2024-07-16 NOTE — H&P ADULT - PROBLEM/PLAN-5
Paring Method: 15 blade scalpel Medical Necessity Clause: This procedure was medically necessary because the patient has hyperkeratotic growth of skin over the lesion to be treated. Medical Necessity Information: LCD Guidelines vary from payer to payer. Please check with your payer's policy to determine medical necessity. Many payers require at least 1 Class A indication, 2 Class B indications or 1 Class B and 2 Class C to qualify for insurance payment. DISPLAY PLAN FREE TEXT

## 2024-07-30 NOTE — PATIENT PROFILE ADULT - STATED REASON FOR ADMISSION
Pippa from [facility] called and she faxed over forms on July 16 to be completed to be able to get Janet on [facility], she is going to refax the over   knee pain

## 2024-07-31 ENCOUNTER — NON-APPOINTMENT (OUTPATIENT)
Age: 44
End: 2024-07-31

## 2024-08-01 ENCOUNTER — APPOINTMENT (OUTPATIENT)
Dept: SURGERY | Facility: CLINIC | Age: 44
End: 2024-08-01
Payer: MEDICARE

## 2024-08-01 VITALS
SYSTOLIC BLOOD PRESSURE: 147 MMHG | HEIGHT: 78 IN | TEMPERATURE: 98 F | WEIGHT: 315 LBS | BODY MASS INDEX: 36.45 KG/M2 | DIASTOLIC BLOOD PRESSURE: 69 MMHG | OXYGEN SATURATION: 95 % | HEART RATE: 77 BPM

## 2024-08-01 DIAGNOSIS — E66.01 MORBID (SEVERE) OBESITY DUE TO EXCESS CALORIES: ICD-10-CM

## 2024-08-01 PROCEDURE — 99213 OFFICE O/P EST LOW 20 MIN: CPT

## 2024-08-04 NOTE — PROVIDER CONTACT NOTE (OTHER) - NAME OF MD/NP/PA/DO NOTIFIED:
August 4, 2024        Mili Mane  [address]    [place] [facility] [address]          Dear Mili Mane:    You were seen in the [facility] Emergency Department at [facility] on 8/1/2024.  We are unable to reach you by phone, so we are sending you this letter.     It is important that you call [facility] Emergency Department lab result nurse at [phone], as we have information to relay to you AND/OR we MAY have to make some changes in your treatment.    Best time to call back is between 9AM and 5:30PM, 7 days a week.      Sincerely,     [facility] Emergency Department Lab Result RN  [phone]  
Ananth Nichols MD
Karen Mcpherson
Margy GARCIA
Marie Cuevas MD
CAROLYNN Elizabeth
Dr. Lico Duong
Jay Lim MD
RADHA Main

## 2024-08-08 ENCOUNTER — APPOINTMENT (OUTPATIENT)
Dept: PSYCHIATRY | Facility: CLINIC | Age: 44
End: 2024-08-08

## 2024-08-08 NOTE — HISTORY OF PRESENT ILLNESS
[de-identified] : PRESTON JOHNSON is a 44 year old male with morbid obesity (BMI 42.92 kg/m2) who present in the office for pre-operative follow-up and weight management program in preparation for Robotic assisted Laparoscopic Sleeve Gastrectomy.  Patient is here for monthly weigh ins. Today's weight is 381 lb and BMI 42.92 kg/m2. Patient lost 25 lb since the beginning of the process, gained 10 lbs . Today patient is doing well, offers no complaints. Patient results reviewed with the patient; patient verbalized the understanding. Patient will continue to obtain the clearances RTO next month.   -Nutrition: Cleared -Cardiology: pending clearance  -Pulmonary: pending clearance  -GI: EGD w/ Dr. Bueno, Negative for H. pylori.  -Psych: pending clearance, stated that he needs a provider that will take his insurance.  -PCP Letter: pending letter of support.

## 2024-08-08 NOTE — PATIENT PROFILE ADULT - FUNCTIONAL ASSESSMENT - BASIC MOBILITY 1.
What Type Of Note Output Would You Prefer (Optional)?: Standard Output Hpi Title: Evaluation of Skin Lesions Additional History: Patient presents for a full body skin exam and is here with her . She has history significant for moderately dysplastic nevus. She has an upcoming appointment with her allergist and has paused taking her Pepsid and pantoprazole per their instructions 4 = No assist / stand by assistance

## 2024-08-08 NOTE — HISTORY OF PRESENT ILLNESS
[de-identified] : PRESTON JOHNSON is a 44 year old male with morbid obesity (BMI 42.92 kg/m2) who present in the office for pre-operative follow-up and weight management program in preparation for Robotic assisted Laparoscopic Sleeve Gastrectomy.  Patient is here for monthly weigh ins. Today's weight is 381 lb and BMI 42.92 kg/m2. Patient lost 25 lb since the beginning of the process, gained 10 lbs . Today patient is doing well, offers no complaints. Patient results reviewed with the patient; patient verbalized the understanding. Patient will continue to obtain the clearances RTO next month.   -Nutrition: Cleared -Cardiology: pending clearance  -Pulmonary: pending clearance  -GI: EGD w/ Dr. Bueno, Negative for H. pylori.  -Psych: pending clearance, stated that he needs a provider that will take his insurance.  -PCP Letter: pending letter of support.

## 2024-08-08 NOTE — PHYSICAL EXAM
[Obese] : obese [Normal] : affect appropriate [de-identified] : Normoactive bowel sounds, soft and nontender, no hepatosplenomegaly or masses noted, well healed scars

## 2024-08-08 NOTE — PHYSICAL EXAM
[Obese] : obese [Normal] : affect appropriate [de-identified] : Normoactive bowel sounds, soft and nontender, no hepatosplenomegaly or masses noted, well healed scars

## 2024-08-08 NOTE — ASSESSMENT
[FreeTextEntry1] : PRESTON JOHNSON is a 44 year old male with morbid obesity (BMI 42.92 kg/m2) who present in the office for pre-operative follow-up and weight management program in preparation for Robotic assisted Laparoscopic Sleeve Gastrectomy.    Patient is doing well, offers no complaints, he will continue to obtain the clearances.  Patient weight is 381 lb and BMI 42.92 kg/m2.    Pending Clearances: Pulmonary,  Cardiologist   Psychology   PCP Letter of Support   RTO post clearance and as needed

## 2024-08-12 NOTE — ED ADULT TRIAGE NOTE - WEIGHT IN KG
Procedure note    Ramon Jenkins  MRN:  [id number]  :  2006    Date of surgery: 2024       Pre-op diagnosis: pilonidal abscess    Post-op diagnosis: same    Procedure performed: incision and drainage of pilonidal abscess    Attending surgeon: Peter Gavin MD    Assistant surgeon: none    Anesthesia: local    Estimated blood loss: minimal    Indications: The patient is a 18 year old -year-old with a symptomatic pilonidal abscess indicated for drainage    Findings: 10cc purulent drainage      Description of procedure:    Prior to the procedure a thorough discussion was had with the patient. All questions were answered. Risks, benefits, and alternatives to surgery were explained. Informed consent was signed and in the chart. The patient was taken back to the procedure room and laid in the prone position on the table. The patients gluteal cleft was then prepped and draped in the usual sterile manner.     I began by injecting 1% lidocaine with epinephrine then made a 0.7 cm circumferential incision. Copious purulence was expressed. I used a clamp to break apart any loculations then packed the wound with 4x4 gauze.    The patient tolerated the procedure well and there were no immediate complications to report          Peter Gavin MD          
203.7

## 2024-08-28 NOTE — ED PROVIDER NOTE - CARE PLAN
Chief Complaint  Annual Exam     Subjective:      History of Present Illness {CC  Problem List  Visit  Diagnosis   Encounters  Notes  Medications  Labs  Result Review Imaging  Media :23}     Gertrude Aburto presents to [facility] for:      History of Present Illness     Pt had fistulotomy 4/2024  - Pt seeing Gato Bains MD and MD Daron      pt quit smoking 4/2024    Gertrude is here for coordination of medical care, to discuss health maintenance, disease prevention as well as to followup on medical problems.      Patient Care Team:  Azra Marshall APRN as PCP - General (Nurse Practitioner)  Nichole Jackson MD as Consulting Physician (Obstetrics and Gynecology)  Kerrie Neri PA-C as Physician Assistant (Colon and Rectal Surgery)      Activity level is minimal.   Exercises 2 per week.      Weight trend is         Wt Readings from Last 4 Encounters:   08/28/23 79.8 kg (176 lb)   08/01/23 79.4 kg (175 lb)   07/10/23 80.6 kg (177 lb 9.6 oz)   05/26/23 79.7 kg (175 lb 9.6 oz)          Health Maintenance Female:     GYN: MD Renetta  Last gynecology appointment: 8/12/2024  Patient's last mammogram was 11/2024  Advised routine self-breast exams monthly.  Sexually active: yes  Pap smears have been: pt has full hysterectomy      Colon cancer screen:      She has no change in bowel habits.   Patient's last colonoscopy is scheduled for the next few weeks.   Pt is scheduled for colonoscopy with this surgery.      Vaccines: UTD     Last eye exam: 8/2022     Advised regular sunscreen.          Her cardiovascular risks are:      [] No Known risk factors     [] Hypertension                                 [] Hyperlipidemia  [] Diabetes                                         [x] Obesity  [x] Family history                                 [x] Current or hx tobacco use  [] Sedentary lifestyle                         [] Post-menopausal      I have reviewed patient's  medical history, any new submitted information provided by patient or medical assistant and updated medical record.      Objective:      Physical Exam  Vitals reviewed.   Constitutional:       General: She is not in acute distress.     Appearance: Normal appearance. She is not ill-appearing, toxic-appearing or diaphoretic.   HENT:      Head: Normocephalic.      Mouth/Throat:      Mouth: Mucous membranes are moist.      Pharynx: Oropharynx is clear.   Eyes:      General:         Right eye: No discharge.         Left eye: No discharge.      Extraocular Movements: Extraocular movements intact.      Conjunctiva/sclera: Conjunctivae normal.      Pupils: Pupils are equal, round, and reactive to light.   Neck:      Vascular: No carotid bruit.   Cardiovascular:      Rate and Rhythm: Normal rate and regular rhythm.      Pulses: Normal pulses.      Heart sounds: Normal heart sounds. No murmur heard.     No friction rub. No gallop.   Pulmonary:      Effort: Pulmonary effort is normal. No respiratory distress.      Breath sounds: Normal breath sounds. No stridor. No wheezing, rhonchi or rales.   Chest:      Chest wall: No tenderness.   Abdominal:      General: Abdomen is flat. Bowel sounds are normal. There is no distension.      Palpations: Abdomen is soft. There is no mass.      Tenderness: There is no abdominal tenderness. There is no guarding or rebound.      Hernia: No hernia is present.   Musculoskeletal:         General: No swelling, tenderness, deformity or signs of injury. Normal range of motion.      Cervical back: Normal range of motion. No rigidity or tenderness.      Right lower leg: No edema.      Left lower leg: No edema.   Lymphadenopathy:      Cervical: No cervical adenopathy.   Skin:     General: Skin is warm and dry.      Capillary Refill: Capillary refill takes less than 2 seconds.      Coloration: Skin is not jaundiced or pale.      Findings: No bruising, erythema, lesion or rash.   Neurological:       "General: No focal deficit present.      Mental Status: She is alert and oriented to person, place, and time. Mental status is at baseline.      Motor: No weakness.      Gait: Gait normal.   Psychiatric:         Mood and Affect: Mood normal.         Behavior: Behavior normal.         Thought Content: Thought content normal.         Judgment: Judgment normal.        Result Review  Data Reviewed:{ Labs  Result Review  Imaging  Med Tab  Media :23}                Vital Signs:   /72 (BP Location: Left arm, Patient Position: Sitting, Cuff Size: Large Adult)   Pulse 69   Temp 98.4 °F (36.9 °C) (Oral)   Ht 154.9 cm (61\")   Wt 84.7 kg (186 lb 12.8 oz)   SpO2 97%   BMI 35.30 kg/m²   Estimated body mass index is 35.3 kg/m² as calculated from the following:    Height as of this encounter: 154.9 cm (61\").    Weight as of this encounter: 84.7 kg (186 lb 12.8 oz).        Requested Prescriptions      No prescriptions requested or ordered in this encounter       Routine medications provided by this office will also be refilled via pharmacy request.       Current Outpatient Medications:     fexofenadine (ALLEGRA) 60 MG tablet, Take 1 tablet by mouth Daily., Disp: , Rfl:     FIBER ADULT GUMMIES PO, Take 2 each by mouth Daily., Disp: , Rfl:     polyethylene glycol (MIRALAX) 17 g packet, Take 17 g by mouth 2 (Two) Times a Day., Disp: , Rfl:      Assessment and Plan:      Assessment and Plan {CC Problem List  Visit Diagnosis  ROS  Review (Popup)  Health Maintenance  Quality  BestPractice  Medications  SmartSets  SnapShot Encounters  Media :23}     Diagnoses and all orders for this visit:    1. Routine general medical examination at a health care facility (Primary)  -     Lipid Panel  -     CBC & Differential  -     Comprehensive Metabolic Panel  -     Hemoglobin A1c    2. At risk for coronary artery disease  -     Lipid Panel  -     Comprehensive Metabolic Panel    3. Hypertension screen  -     Lipid Panel  -   "   Comprehensive Metabolic Panel  -     Hemoglobin A1c    4. Screening for cardiovascular condition  -     Lipid Panel  -     CBC & Differential  -     Comprehensive Metabolic Panel    5. Prediabetes  -     CBC & Differential  -     Comprehensive Metabolic Panel  -     Hemoglobin A1c    6. Moderate mixed hyperlipidemia not requiring statin therapy  -     Lipid Panel  -     CBC & Differential  -     Comprehensive Metabolic Panel             No orders of the defined types were placed in this encounter.      Will get labs and review with patient once available.  Educated patient to maintain a healthy diet and daily exercise.  Patient verbalizes understanding and is comfortable with the plan of care.    Follow Up {Instructions Charge Capture  Follow-up Communications :23}     Return in about 1 year (around 8/28/2025).      Patient was given instructions and counseling regarding her condition or for health maintenance advice. Please see specific information pulled into the AVS if appropriate.    Dragon disclaimer:   Much of this encounter note is an electronic transcription/translation of spoken language to printed text. The electronic translation of spoken language may permit erroneous, or at times, nonsensical words or phrases to be inadvertently transcribed; Although I have reviewed the note for such errors, some may still exist.     Additional Patient Counseling:       Patient Instructions   Preventive Care 21-39 Years Old, Female  Preventive care refers to lifestyle choices and visits with your health care provider that can promote health and wellness. Preventive care visits are also called wellness exams.  What can I expect for my preventive care visit?  Counseling  During your preventive care visit, your health care provider may ask about your:  Medical history, including:  Past medical problems.  Family medical history.  Pregnancy history.  Current health, including:  Menstrual cycle.  Method of birth  control.  Emotional well-being.  Home life and relationship well-being.  Sexual activity and sexual health.  Lifestyle, including:  Alcohol, nicotine or tobacco, and drug use.  Access to firearms.  Diet, exercise, and sleep habits.  Work and work environment.  Sunscreen use.  Safety issues such as seatbelt and bike helmet use.  Physical exam  Your health care provider may check your:  Height and weight. These may be used to calculate your BMI (body mass index). BMI is a measurement that tells if you are at a healthy weight.  Waist circumference. This measures the distance around your waistline. This measurement also tells if you are at a healthy weight and may help predict your risk of certain diseases, such as type 2 diabetes and high blood pressure.  Heart rate and blood pressure.  Body temperature.  Skin for abnormal spots.  What immunizations do I need?    Vaccines are usually given at various ages, according to a schedule. Your health care provider will recommend vaccines for you based on your age, medical history, and lifestyle or other factors, such as travel or where you work.  What tests do I need?  Screening  Your health care provider may recommend screening tests for certain conditions. This may include:  Pelvic exam and Pap test.  Lipid and cholesterol levels.  Diabetes screening. This is done by checking your blood sugar (glucose) after you have not eaten for a while (fasting).  Hepatitis B test.  Hepatitis C test.  HIV (human immunodeficiency virus) test.  STI (sexually transmitted infection) testing, if you are at risk.  BRCA-related cancer screening. This may be done if you have a family history of breast, ovarian, tubal, or peritoneal cancers.  Talk with your health care provider about your test results, treatment options, and if necessary, the need for more tests.  Follow these instructions at home:  Eating and drinking    Eat a healthy diet that includes fresh fruits and vegetables, whole grains,  lean protein, and low-fat dairy products.  Take vitamin and mineral supplements as recommended by your health care provider.  Do not drink alcohol if:  Your health care provider tells you not to drink.  You are pregnant, may be pregnant, or are planning to become pregnant.  If you drink alcohol:  Limit how much you have to 0-1 drink a day.  Know how much alcohol is in your drink. In the [place] one drink equals one 12 oz bottle of beer (355 mL), one 5 oz glass of wine (148 mL), or one 1½ oz glass of hard liquor (44 mL).  Lifestyle  Brush your teeth every morning and night with fluoride toothpaste. Floss one time each day.  Exercise for at least 30 minutes 5 or more days each week.  Do not use any products that contain nicotine or tobacco. These products include cigarettes, chewing tobacco, and vaping devices, such as e-cigarettes. If you need help quitting, ask your health care provider.  Do not use drugs.  If you are sexually active, practice safe sex. Use a condom or other form of protection to prevent STIs.  If you do not wish to become pregnant, use a form of birth control. If you plan to become pregnant, see your health care provider for a prepregnancy visit.  Find healthy ways to manage stress, such as:  Meditation, yoga, or listening to music.  Journaling.  Talking to a trusted person.  Spending time with friends and family.  Minimize exposure to UV radiation to reduce your risk of skin cancer.  Safety  Always wear your seat belt while driving or riding in a vehicle.  Do not drive:  If you have been drinking alcohol. Do not ride with someone who has been drinking.  If you have been using any mind-altering substances or drugs.  While texting.  When you are tired or distracted.  Wear a helmet and other protective equipment during sports activities.  If you have firearms in your house, make sure you follow all gun safety procedures.  Seek help if you have been physically or sexually abused.  What's next?  Go to  your health care provider once a year for an annual wellness visit.  Ask your health care provider how often you should have your eyes and teeth checked.  Stay up to date on all vaccines.  This information is not intended to replace advice given to you by your health care provider. Make sure you discuss any questions you have with your health care provider.  Document Revised: 06/15/2022 Document Reviewed: 06/15/2022  Elsevier Patient Education © 2024 [employer]      Principal Discharge DX:	Syncope  Secondary Diagnosis:	Pancreatitis  Secondary Diagnosis:	ESRD on hemodialysis

## 2024-08-29 ENCOUNTER — APPOINTMENT (OUTPATIENT)
Dept: PSYCHIATRY | Facility: CLINIC | Age: 44
End: 2024-08-29

## 2024-08-29 PROCEDURE — 90791 PSYCH DIAGNOSTIC EVALUATION: CPT | Mod: 2W

## 2024-08-30 NOTE — ED PROVIDER NOTE - MUSCULOSKELETAL, MLM
Your home care company is [employer]: [phone]  Please contact [employer] for all questions related to supplies and your CPAP machine.    CPAP/BiPAP TIPS     Please be advised:   Do not drive while sleepy   Take CPAP/BiPAP machine to any procedure that requires sedation  When should I clean my machine & supplies?  DAILY  Wipe mask cushions or nasal pillow   Empty & rinse water chamber- refill with distilled water  WEEKLY  Clean mask cushions or nasal pillow, headgear, tubing, and humidifier chamber with mild soap (Lucie) and water   If water chamber has hard water buildup (white crust), soak in warm water & vinegar mix 50/50.  Rinse and hang dry    When should supplies be replaced?  Contact your home care company for replacement supplies, or if your machine is malfunctioning  *Below is a general guideline of what we recommend. Replacement of supplies differs depending on your insurance company*  MONTHLY: Replace filter and mask cushion  MONTHS: Replace headgear and tubing    Travel Tips  Keep CPAP/BiPAP in original bag when traveling, and place luggage tag on bag  Most airlines consider CPAP/BiPAP to be a medical device, therefore it is a free carry-on item  If unable to get distilled water, bottled water is safe while traveling. DO NOT use tap water  When traveling outside the [place] only a power adapter is necessary (CPAP can operate without a converter), bring an extension cord  Consider purchasing or renting a travel CPAP (not covered by insurance)  Dry Mouth/Nose  Turn up the humidity on your machine (select “Options” from the home screen)  Place a cool mist humidifier at your bedside  Over-the-counter remedies: Biotene, XyliMelts, NasoGel   Air Leak  Try adjusting your mask/headgear while laying in sleeping position vs. sitting up  Wash and dry your face prior to putting your mask on  If applicable: shave facial hair at night (or before wearing CPAP)  Purchase “RemZzzs” (through home  care co., cpap.com, or remzzzs.com)  100% cotton knit barrier that goes between your mask cushion and your skin  Replace your mask cushion (at least once per month) and/or headgear (every 3-6 months)  Nasal Congestion  CPAP therapy can cause nasal passages to dry out, & mucous membranes try to protect the nasal passages by producing excess mucous, so congestion results.  Over-the counter remedies: Flonase, Nasacort, Sinus Rinses (Neti-Pot), DO NOT USE Afrin  Try a mask that goes over the nose and mouth  Skin Irritation  Clean supplies regularly (Citrus II Mask Wipes, Control III disinfectant solution)  Try over the counter creams such as hydrocortisone 1% (apply in the morning after showering)  Your headgear may be too tight, replace supplies so you don't need to adjust so tightly  Try RemZzzs (100% cotton knit barrier that goes between your mask cushion and your skin)  Gas/Bloating  Try a different sleeping position to keep air out of the stomach. Lay on the left side or rotate to the right side. Incline with pillows or lay flat.  Over-the-counter remedies: Simethicone     Spine appears normal, range of motion is not limited, no muscle or joint tenderness. paraspinal ttp

## 2024-09-05 VITALS — BODY MASS INDEX: 42.25 KG/M2 | WEIGHT: 315 LBS

## 2024-09-05 NOTE — REASON FOR VISIT
[Home] : at home, [unfilled] , at the time of the visit. [Other Location: e.g. Home (Enter Location, City,State)___] : at [unfilled] [Patient] : the patient [Initial Consult] : an initial consult for [Morbid Obesity (BMI>40)] : morbid obesity (bmi>40) [Referring By:  ___] : ~Delfino Ln~ was referred for psychological evaluation by Dr. AVILA [Attempted Weight Loss] : attempted weight loss [Commitment to Modified Lifestyle] : commitment to a modified lifestyle pre and post surgery [Difficulties with Diet Compliance] : difficulties with diet compliance  [Expectations of Outcome] : expectations of outcome [Motivation for Selecting Surgery] : motivation for selecting surgery [Strength of Social Support System] : strength of social support system [Patient Understands Data May be Shared] : patient understands that the information discussed during the evaluation would be shared with referring provider and possibly with ~his/her~ insurance provider [de-identified] : for evaluation of psychological appropriateness for bariatric surgery [de-identified] : Confidentiality and its limitations were explained.

## 2024-09-05 NOTE — SOCIAL HISTORY
[Disabled] : disabled [Never ] : never  [# Of Children ___] : has [unfilled] children [High School] : high school [None] : none [FreeTextEntry1] : mother

## 2024-09-05 NOTE — HISTORY OF PRESENT ILLNESS
[Large Portions] : large portions [Emotional Eating] : emotional eating [Decrease Activity] : decrease activity [Emotional Eating] : emotional eating  [Poor Choices] : poor choices [de-identified] : Pt's motivation for surgery is to improve his health (pt. has HTN and is on dialysis and needs a kidney transplant) and to feel better about the way he looks. Per pt., his highest weight was 675 lbs. about 4 years ago and his lowest weight was about 200 lbs. when he was in his 20s. His stated goal weight is 200 lbs. and he expresses intent to make behavioral and dietary changes towards obtaining optimal results.   [de-identified] : sleeve gastrectomy  [de-identified] : a few years; speaking with others who have had bariatric surgery; discussions with surgeon and nutritionist; and reading educational materials provided by surgeon [de-identified] : none.  Pt denies ED hx and bxs, including binge eating. [de-identified] : A current typical day of eating is reported as follows: B: scrambled egg and cheese with turkey or scrambled eggs with a waffle or french toast L: skips S: 3x/week has rice cakes or fruit or cupcakes or blueberry muffins D: 2:00 p.m. pork chops with french fries or rice and beans, or chicken, or ravioli S: pt. sleeps during the day and depending when he wakes up will have some snacks such as a donut and a glass of milk, a bowl of cereal or crackers  Drinks water, 1 cup of coffee with sugar and creamer His diet history is indicated as going to the gym, meal supplements, weight watchers, protein shakes. Despite multiple attempts at diet and exercise modifications, patient reports inability to achieve and maintain significant weight loss.

## 2024-09-05 NOTE — CURRENT PSYCHIATRIC SYMPTOMS
[Depressed Mood] : depressed mood [Decreased Concentration] : decreased concentrating ability [Hypersomnia] : hypersomnia [Hallucination Visual] : visual hallucinations [Hallucination Auditory] : auditory hallucinations [Highly Irritable] : no high irritability [Increased Activity] : no increased in activity [Dec Need For Sleep] : no decreased need for sleep [Excessive Worry] : no excessive worries [Panic] : no panic disorder [de-identified] : pt. reports SI a year or two ago but not currently, and no history of any suicide attempts [FreeTextEntry1] : Pt. started taking medication when he was a teenager. Most recently, he has been seeing various psychiatrists at the [facility] for the past 2 years, for medication management and is seeing a psychotherapist (Tawana Sarkar) for the past two months, after his previous therapist left the facility.  Patient is currently taking Quetiapine (400 mg.) and two other medications that he doesn't remember the names of. Pt. denies any previous psych hospitalizations. Pt. reports that he also participated in group psychotherapy for about a year when he was a teenager.

## 2024-09-05 NOTE — DISCUSSION/SUMMARY
[Educational materials provided] : Educational materials provided [Behavior plan developed] : Behavior plan developed [Strategies to improve adherence identified] : Strategies to improve adherence identified [Addtnl Health & Behavior Intervention: Individual] : Additional Health and Behavior Intervention: Individual [Addtnl Health & Behavior Intervention: Group] : Additional Health and Behavior Intervention: Group [Identify, practice refine strategies to promote adherence to regimen] : Identify, practice refine strategies to promote adherence to regimen [Coping skills training to overcome social/emotional barriers to adherence] : Coping skills training to overcome social/emotional barriers to adherence [FreeTextEntry1] : Based on the information presented in this assessment, Mr. Gonzales is an emotional eater and is making poor dietary choices at times.  He is a conditional candidate for surgery once he has developed skills to prevent disordered eating behaviors as evidenced by self report and pre-surgical weight loss.  He also has some psychiatric sxs that may present a current contraindication, therefore, clearance is also conditional pending documentation from his psychiatrist at the [facility] as well as his therapist, Tawana Sarkar, corroborating this assessment and verifying compliance with treatment and psychiatric stability. [de-identified] : Psychological factors (emotional eating, poor dietary choices) affecting other medical conditions (obesity and associated medical sequelae) Obesity Pt. carries a dx. of Bipolar Disorder and self-reports that he has Schizophrenia [FreeTextEntry3] : Behavioral strategies were discussed to increase his coping skills and assist him in making lifestyle modifications.  Provided psychoeducational materials on changing eating behaviors in preparation for surgery.  It was recommended that he attend the post-surgery group sessions for further education and support to assist him in making lifestyle changes.  Additionally, it was recommended that he utilize writer and RD as needed to assist in making pre-surgical and post-surgical dietary and behavioral changes.      [FreeTextEntry4] : Follow-up scheduled [FreeTextEntry5] : compliance with dietary and behavioral recommendations [FreeTextEntry6] : reduction of obesity related co-morbidities; reduced risk of further medical sequelae of obesity

## 2024-09-05 NOTE — PHYSICAL EXAM
[AH] : auditory hallucinations [VH] : visual hallucinations [Normal] : normal [Fair] : fair [Suicidal Ideation] : no suicidal ideation [Homicidal Ideation] : no homicidal ideation [FreeTextEntry6] : no thought disorder noted at the time of the interview [FreeTextEntry8] : "calm, relaxed" [de-identified] : "my memory comes and goes" [de-identified] : pt. reports that he has a difficult time concentrating

## 2024-09-05 NOTE — HISTORY OF PRESENT ILLNESS
[Large Portions] : large portions [Emotional Eating] : emotional eating [Decrease Activity] : decrease activity [Emotional Eating] : emotional eating  [Poor Choices] : poor choices [de-identified] : Pt's motivation for surgery is to improve his health (pt. has HTN and is on dialysis and needs a kidney transplant) and to feel better about the way he looks. Per pt., his highest weight was 675 lbs. about 4 years ago and his lowest weight was about 200 lbs. when he was in his 20s. His stated goal weight is 200 lbs. and he expresses intent to make behavioral and dietary changes towards obtaining optimal results.   [de-identified] : sleeve gastrectomy  [de-identified] : a few years; speaking with others who have had bariatric surgery; discussions with surgeon and nutritionist; and reading educational materials provided by surgeon [de-identified] : none.  Pt denies ED hx and bxs, including binge eating. [de-identified] : A current typical day of eating is reported as follows: B: scrambled egg and cheese with turkey or scrambled eggs with a waffle or french toast L: skips S: 3x/week has rice cakes or fruit or cupcakes or blueberry muffins D: 2:00 p.m. pork chops with french fries or rice and beans, or chicken, or ravioli S: pt. sleeps during the day and depending when he wakes up will have some snacks such as a donut and a glass of milk, a bowl of cereal or crackers  Drinks water, 1 cup of coffee with sugar and creamer His diet history is indicated as going to the gym, meal supplements, weight watchers, protein shakes. Despite multiple attempts at diet and exercise modifications, patient reports inability to achieve and maintain significant weight loss.

## 2024-09-05 NOTE — REASON FOR VISIT
[Home] : at home, [unfilled] , at the time of the visit. [Other Location: e.g. Home (Enter Location, City,State)___] : at [unfilled] [Patient] : the patient [Initial Consult] : an initial consult for [Morbid Obesity (BMI>40)] : morbid obesity (bmi>40) [Referring By:  ___] : ~Delfino Ln~ was referred for psychological evaluation by Dr. AVILA [Attempted Weight Loss] : attempted weight loss [Commitment to Modified Lifestyle] : commitment to a modified lifestyle pre and post surgery [Difficulties with Diet Compliance] : difficulties with diet compliance  [Expectations of Outcome] : expectations of outcome [Motivation for Selecting Surgery] : motivation for selecting surgery [Strength of Social Support System] : strength of social support system [Patient Understands Data May be Shared] : patient understands that the information discussed during the evaluation would be shared with referring provider and possibly with ~his/her~ insurance provider [de-identified] : for evaluation of psychological appropriateness for bariatric surgery [de-identified] : Confidentiality and its limitations were explained.

## 2024-09-05 NOTE — DISCUSSION/SUMMARY
[Educational materials provided] : Educational materials provided [Behavior plan developed] : Behavior plan developed [Strategies to improve adherence identified] : Strategies to improve adherence identified [Addtnl Health & Behavior Intervention: Individual] : Additional Health and Behavior Intervention: Individual [Addtnl Health & Behavior Intervention: Group] : Additional Health and Behavior Intervention: Group [Identify, practice refine strategies to promote adherence to regimen] : Identify, practice refine strategies to promote adherence to regimen [Coping skills training to overcome social/emotional barriers to adherence] : Coping skills training to overcome social/emotional barriers to adherence [FreeTextEntry1] : Based on the information presented in this assessment, Mr. Gonzales is an emotional eater and is making poor dietary choices at times.  He is a conditional candidate for surgery once he has developed skills to prevent disordered eating behaviors as evidenced by self report and pre-surgical weight loss.  He also has some psychiatric sxs that may present a current contraindication, therefore, clearance is also conditional pending documentation from his psychiatrist at the [facility] as well as his therapist, Tawana Sarkar, corroborating this assessment and verifying compliance with treatment and psychiatric stability. [de-identified] : Psychological factors (emotional eating, poor dietary choices) affecting other medical conditions (obesity and associated medical sequelae) Obesity Pt. carries a dx. of Bipolar Disorder and self-reports that he has Schizophrenia [FreeTextEntry3] : Behavioral strategies were discussed to increase his coping skills and assist him in making lifestyle modifications.  Provided psychoeducational materials on changing eating behaviors in preparation for surgery.  It was recommended that he attend the post-surgery group sessions for further education and support to assist him in making lifestyle changes.  Additionally, it was recommended that he utilize writer and RD as needed to assist in making pre-surgical and post-surgical dietary and behavioral changes.      [FreeTextEntry4] : Follow-up scheduled [FreeTextEntry6] : reduction of obesity related co-morbidities; reduced risk of further medical sequelae of obesity [FreeTextEntry5] : compliance with dietary and behavioral recommendations

## 2024-09-05 NOTE — PHYSICAL EXAM
[AH] : auditory hallucinations [VH] : visual hallucinations [Normal] : normal [Fair] : fair [Suicidal Ideation] : no suicidal ideation [Homicidal Ideation] : no homicidal ideation [FreeTextEntry6] : no thought disorder noted at the time of the interview [FreeTextEntry8] : "calm, relaxed" [de-identified] : "my memory comes and goes" [de-identified] : pt. reports that he has a difficult time concentrating

## 2024-09-05 NOTE — CURRENT PSYCHIATRIC SYMPTOMS
[Depressed Mood] : depressed mood [Decreased Concentration] : decreased concentrating ability [Hypersomnia] : hypersomnia [Hallucination Visual] : visual hallucinations [Hallucination Auditory] : auditory hallucinations [Highly Irritable] : no high irritability [Increased Activity] : no increased in activity [Dec Need For Sleep] : no decreased need for sleep [Excessive Worry] : no excessive worries [Panic] : no panic disorder [de-identified] : pt. reports SI a year or two ago but not currently, and no history of any suicide attempts [FreeTextEntry1] : Pt. started taking medication when he was a teenager. Most recently, he has been seeing various psychiatrists at the [facility] for the past 2 years, for medication management and is seeing a psychotherapist (Tawana Sarkar) for the past two months, after his previous therapist left the facility.  Patient is currently taking Quetiapine (400 mg.) and two other medications that he doesn't remember the names of. Pt. denies any previous psych hospitalizations. Pt. reports that he also participated in group psychotherapy for about a year when he was a teenager.

## 2024-09-05 NOTE — CURRENT PSYCHIATRIC SYMPTOMS
[Depressed Mood] : depressed mood [Decreased Concentration] : decreased concentrating ability [Hypersomnia] : hypersomnia [Hallucination Visual] : visual hallucinations [Hallucination Auditory] : auditory hallucinations [Highly Irritable] : no high irritability [Increased Activity] : no increased in activity [Dec Need For Sleep] : no decreased need for sleep [Excessive Worry] : no excessive worries [Panic] : no panic disorder [de-identified] : pt. reports SI a year or two ago but not currently, and no history of any suicide attempts [FreeTextEntry1] : Pt. started taking medication when he was a teenager. Most recently, he has been seeing various psychiatrists at the [facility] for the past 2 years, for medication management and is seeing a psychotherapist (Tawana Sarkar) for the past two months, after his previous therapist left the facility.  Patient is currently taking Quetiapine (400 mg.) and two other medications that he doesn't remember the names of. Pt. denies any previous psych hospitalizations. Pt. reports that he also participated in group psychotherapy for about a year when he was a teenager.

## 2024-09-05 NOTE — HISTORY OF PRESENT ILLNESS
[Large Portions] : large portions [Emotional Eating] : emotional eating [Decrease Activity] : decrease activity [Emotional Eating] : emotional eating  [Poor Choices] : poor choices [de-identified] : Pt's motivation for surgery is to improve his health (pt. has HTN and is on dialysis and needs a kidney transplant) and to feel better about the way he looks. Per pt., his highest weight was 675 lbs. about 4 years ago and his lowest weight was about 200 lbs. when he was in his 20s. His stated goal weight is 200 lbs. and he expresses intent to make behavioral and dietary changes towards obtaining optimal results.   [de-identified] : sleeve gastrectomy  [de-identified] : a few years; speaking with others who have had bariatric surgery; discussions with surgeon and nutritionist; and reading educational materials provided by surgeon [de-identified] : none.  Pt denies ED hx and bxs, including binge eating. [de-identified] : A current typical day of eating is reported as follows: B: scrambled egg and cheese with turkey or scrambled eggs with a waffle or french toast L: skips S: 3x/week has rice cakes or fruit or cupcakes or blueberry muffins D: 2:00 p.m. pork chops with french fries or rice and beans, or chicken, or ravioli S: pt. sleeps during the day and depending when he wakes up will have some snacks such as a donut and a glass of milk, a bowl of cereal or crackers  Drinks water, 1 cup of coffee with sugar and creamer His diet history is indicated as going to the gym, meal supplements, weight watchers, protein shakes. Despite multiple attempts at diet and exercise modifications, patient reports inability to achieve and maintain significant weight loss.

## 2024-09-05 NOTE — DISCUSSION/SUMMARY
[Educational materials provided] : Educational materials provided [Behavior plan developed] : Behavior plan developed [Strategies to improve adherence identified] : Strategies to improve adherence identified [Addtnl Health & Behavior Intervention: Individual] : Additional Health and Behavior Intervention: Individual [Addtnl Health & Behavior Intervention: Group] : Additional Health and Behavior Intervention: Group [Identify, practice refine strategies to promote adherence to regimen] : Identify, practice refine strategies to promote adherence to regimen [Coping skills training to overcome social/emotional barriers to adherence] : Coping skills training to overcome social/emotional barriers to adherence [FreeTextEntry1] : Based on the information presented in this assessment, Mr. Gonzales is an emotional eater and is making poor dietary choices at times.  He is a conditional candidate for surgery once he has developed skills to prevent disordered eating behaviors as evidenced by self report and pre-surgical weight loss.  He also has some psychiatric sxs that may present a current contraindication, therefore, clearance is also conditional pending documentation from his psychiatrist at the [facility] as well as his therapist, Tawana Sarkar, corroborating this assessment and verifying compliance with treatment and psychiatric stability. [de-identified] : Psychological factors (emotional eating, poor dietary choices) affecting other medical conditions (obesity and associated medical sequelae) Obesity Pt. carries a dx. of Bipolar Disorder and self-reports that he has Schizophrenia [FreeTextEntry3] : Behavioral strategies were discussed to increase his coping skills and assist him in making lifestyle modifications.  Provided psychoeducational materials on changing eating behaviors in preparation for surgery.  It was recommended that he attend the post-surgery group sessions for further education and support to assist him in making lifestyle changes.  Additionally, it was recommended that he utilize writer and RD as needed to assist in making pre-surgical and post-surgical dietary and behavioral changes.      [FreeTextEntry4] : Follow-up scheduled [FreeTextEntry6] : reduction of obesity related co-morbidities; reduced risk of further medical sequelae of obesity [FreeTextEntry5] : compliance with dietary and behavioral recommendations

## 2024-09-05 NOTE — PHYSICAL EXAM
[AH] : auditory hallucinations [VH] : visual hallucinations [Normal] : normal [Fair] : fair [Suicidal Ideation] : no suicidal ideation [Homicidal Ideation] : no homicidal ideation [FreeTextEntry6] : no thought disorder noted at the time of the interview [FreeTextEntry8] : "calm, relaxed" [de-identified] : "my memory comes and goes" [de-identified] : pt. reports that he has a difficult time concentrating

## 2024-09-05 NOTE — CURRENT PSYCHIATRIC SYMPTOMS
[Depressed Mood] : depressed mood [Decreased Concentration] : decreased concentrating ability [Hypersomnia] : hypersomnia [Hallucination Visual] : visual hallucinations [Hallucination Auditory] : auditory hallucinations [Highly Irritable] : no high irritability [Increased Activity] : no increased in activity [Dec Need For Sleep] : no decreased need for sleep [Excessive Worry] : no excessive worries [Panic] : no panic disorder [de-identified] : pt. reports SI a year or two ago but not currently, and no history of any suicide attempts [FreeTextEntry1] : Pt. started taking medication when he was a teenager. Most recently, he has been seeing various psychiatrists at the [facility] for the past 2 years, for medication management and is seeing a psychotherapist (Tawana Sarkar) for the past two months, after his previous therapist left the facility.  Patient is currently taking Quetiapine (400 mg.) and two other medications that he doesn't remember the names of. Pt. denies any previous psych hospitalizations. Pt. reports that he also participated in group psychotherapy for about a year when he was a teenager.

## 2024-09-05 NOTE — REASON FOR VISIT
[Home] : at home, [unfilled] , at the time of the visit. [Other Location: e.g. Home (Enter Location, City,State)___] : at [unfilled] [Patient] : the patient [Initial Consult] : an initial consult for [Morbid Obesity (BMI>40)] : morbid obesity (bmi>40) [Referring By:  ___] : ~Delfino Ln~ was referred for psychological evaluation by Dr. AVILA [Attempted Weight Loss] : attempted weight loss [Commitment to Modified Lifestyle] : commitment to a modified lifestyle pre and post surgery [Difficulties with Diet Compliance] : difficulties with diet compliance  [Expectations of Outcome] : expectations of outcome [Motivation for Selecting Surgery] : motivation for selecting surgery [Strength of Social Support System] : strength of social support system [Patient Understands Data May be Shared] : patient understands that the information discussed during the evaluation would be shared with referring provider and possibly with ~his/her~ insurance provider [de-identified] : for evaluation of psychological appropriateness for bariatric surgery [de-identified] : Confidentiality and its limitations were explained.

## 2024-09-05 NOTE — REASON FOR VISIT
[Home] : at home, [unfilled] , at the time of the visit. [Other Location: e.g. Home (Enter Location, City,State)___] : at [unfilled] [Patient] : the patient [Initial Consult] : an initial consult for [Morbid Obesity (BMI>40)] : morbid obesity (bmi>40) [Referring By:  ___] : ~Delfino Ln~ was referred for psychological evaluation by Dr. AVILA [Attempted Weight Loss] : attempted weight loss [Commitment to Modified Lifestyle] : commitment to a modified lifestyle pre and post surgery [Difficulties with Diet Compliance] : difficulties with diet compliance  [Expectations of Outcome] : expectations of outcome [Motivation for Selecting Surgery] : motivation for selecting surgery [Strength of Social Support System] : strength of social support system [Patient Understands Data May be Shared] : patient understands that the information discussed during the evaluation would be shared with referring provider and possibly with ~his/her~ insurance provider [de-identified] : for evaluation of psychological appropriateness for bariatric surgery [de-identified] : Confidentiality and its limitations were explained.

## 2024-09-05 NOTE — PHYSICAL EXAM
[AH] : auditory hallucinations [VH] : visual hallucinations [Normal] : normal [Fair] : fair [Suicidal Ideation] : no suicidal ideation [Homicidal Ideation] : no homicidal ideation [FreeTextEntry6] : no thought disorder noted at the time of the interview [FreeTextEntry8] : "calm, relaxed" [de-identified] : "my memory comes and goes" [de-identified] : pt. reports that he has a difficult time concentrating

## 2024-09-05 NOTE — DISCUSSION/SUMMARY
[Educational materials provided] : Educational materials provided [Behavior plan developed] : Behavior plan developed [Strategies to improve adherence identified] : Strategies to improve adherence identified [Addtnl Health & Behavior Intervention: Individual] : Additional Health and Behavior Intervention: Individual [Addtnl Health & Behavior Intervention: Group] : Additional Health and Behavior Intervention: Group [Identify, practice refine strategies to promote adherence to regimen] : Identify, practice refine strategies to promote adherence to regimen [Coping skills training to overcome social/emotional barriers to adherence] : Coping skills training to overcome social/emotional barriers to adherence [FreeTextEntry1] : Based on the information presented in this assessment, Mr. Gonzales is an emotional eater and is making poor dietary choices at times.  He is a conditional candidate for surgery once he has developed skills to prevent disordered eating behaviors as evidenced by self report and pre-surgical weight loss.  He also has some psychiatric sxs that may present a current contraindication, therefore, clearance is also conditional pending documentation from his psychiatrist at the [facility] as well as his therapist, Tawana Sarkar, corroborating this assessment and verifying compliance with treatment and psychiatric stability. [de-identified] : Psychological factors (emotional eating, poor dietary choices) affecting other medical conditions (obesity and associated medical sequelae) Obesity Pt. carries a dx. of Bipolar Disorder and self-reports that he has Schizophrenia [FreeTextEntry3] : Behavioral strategies were discussed to increase his coping skills and assist him in making lifestyle modifications.  Provided psychoeducational materials on changing eating behaviors in preparation for surgery.  It was recommended that he attend the post-surgery group sessions for further education and support to assist him in making lifestyle changes.  Additionally, it was recommended that he utilize writer and RD as needed to assist in making pre-surgical and post-surgical dietary and behavioral changes.      [FreeTextEntry4] : Follow-up scheduled [FreeTextEntry5] : compliance with dietary and behavioral recommendations [FreeTextEntry6] : reduction of obesity related co-morbidities; reduced risk of further medical sequelae of obesity

## 2024-09-07 ENCOUNTER — EMERGENCY (EMERGENCY)
Facility: HOSPITAL | Age: 44
LOS: 1 days | Discharge: HOME | End: 2024-09-07
Attending: EMERGENCY MEDICINE
Payer: MEDICARE

## 2024-09-07 VITALS
OXYGEN SATURATION: 96 % | HEIGHT: 78 IN | DIASTOLIC BLOOD PRESSURE: 84 MMHG | TEMPERATURE: 99 F | HEART RATE: 75 BPM | SYSTOLIC BLOOD PRESSURE: 160 MMHG | RESPIRATION RATE: 16 BRPM | WEIGHT: 315 LBS

## 2024-09-07 DIAGNOSIS — Z98.890 OTHER SPECIFIED POSTPROCEDURAL STATES: Chronic | ICD-10-CM

## 2024-09-07 LAB
ALBUMIN SERPL ELPH-MCNC: 3.4 G/DL — LOW (ref 3.5–5)
ALP SERPL-CCNC: 185 U/L — HIGH (ref 40–120)
ALT FLD-CCNC: 160 U/L DA — HIGH (ref 10–60)
ANION GAP SERPL CALC-SCNC: 7 MMOL/L — SIGNIFICANT CHANGE UP (ref 5–17)
BILIRUB SERPL-MCNC: 0.6 MG/DL — SIGNIFICANT CHANGE UP (ref 0.2–1.2)
BUN SERPL-MCNC: 66 MG/DL — HIGH (ref 7–18)
CALCIUM SERPL-MCNC: 9.6 MG/DL — SIGNIFICANT CHANGE UP (ref 8.4–10.5)
CHLORIDE SERPL-SCNC: 97 MMOL/L — SIGNIFICANT CHANGE UP (ref 96–108)
CO2 SERPL-SCNC: 28 MMOL/L — SIGNIFICANT CHANGE UP (ref 22–31)
CREAT SERPL-MCNC: 7.68 MG/DL — HIGH (ref 0.5–1.3)
EGFR: 8 ML/MIN/1.73M2 — LOW
GLUCOSE SERPL-MCNC: 107 MG/DL — HIGH (ref 70–99)
HCT VFR BLD CALC: 28 % — LOW (ref 39–50)
HGB BLD-MCNC: 9.3 G/DL — LOW (ref 13–17)
HIV 1 & 2 AB SERPL IA.RAPID: SIGNIFICANT CHANGE UP
MCHC RBC-ENTMCNC: 32.9 PG — SIGNIFICANT CHANGE UP (ref 27–34)
MCHC RBC-ENTMCNC: 33.2 GM/DL — SIGNIFICANT CHANGE UP (ref 32–36)
MCV RBC AUTO: 98.9 FL — SIGNIFICANT CHANGE UP (ref 80–100)
PHOSPHATE SERPL-MCNC: 7.2 MG/DL — HIGH (ref 2.5–4.5)
PLATELET # BLD AUTO: 141 K/UL — LOW (ref 150–400)
PROT SERPL-MCNC: 8.3 G/DL — SIGNIFICANT CHANGE UP (ref 6–8.3)
RBC # BLD: 2.83 M/UL — LOW (ref 4.2–5.8)
RBC # FLD: 13.9 % — SIGNIFICANT CHANGE UP (ref 10.3–14.5)
SODIUM SERPL-SCNC: 132 MMOL/L — LOW (ref 135–145)
WBC # BLD: 4.74 K/UL — SIGNIFICANT CHANGE UP (ref 3.8–10.5)
WBC # FLD AUTO: 4.74 K/UL — SIGNIFICANT CHANGE UP (ref 3.8–10.5)

## 2024-09-07 PROCEDURE — 99285 EMERGENCY DEPT VISIT HI MDM: CPT

## 2024-09-07 RX ADMIN — Medication 4 MILLIGRAM(S): at 23:38

## 2024-09-07 NOTE — ED ADULT NURSE NOTE - ED STAT RN HANDOFF DETAILS 2
Patient discharged home as per MD order, IV access removed no redness, swelling  or bleeding noted at site. All discharge instructions and F/U visits provided , prescription sent to pharmacy. Patient verbalizes understanding leaving ambulatory in no acute distress to awaiting taxi

## 2024-09-07 NOTE — ED ADULT NURSE NOTE - NSFALLHARMRISKINTERV_ED_ALL_ED
Assistance OOB with selected safe patient handling equipment if applicable/Communicate risk of Fall with Harm to all staff, patient, and family/Provide visual cue: red socks, yellow wristband, yellow gown, etc/Reinforce activity limits and safety measures with patient and family/Bed in lowest position, wheels locked, appropriate side rails in place/Call bell, personal items and telephone in reach/Instruct patient to call for assistance before getting out of bed/chair/stretcher/Non-slip footwear applied when patient is off stretcher/[place] to call system/Physically safe environment - no spills, clutter or unnecessary equipment/Purposeful Proactive Rounding/Room/bathroom lighting operational, light cord in reach

## 2024-09-07 NOTE — ED ADULT NURSE NOTE - OBJECTIVE STATEMENT
pt had balloon procedure x 1 week ago Saturday pt w/pain and swelling to RUE fistula in place bruit strong skin dry warm to touch denies any fever chills nausea vomiting diarrhea, pt had dialysis yesterday M/W/Fr AV fistula RUE pt AAOx4, using oxygen at home 5L, abd soft ntnd x  4 quads moving all extremities,

## 2024-09-07 NOTE — CONSULT NOTE ADULT - NS ATTEND AMEND GEN_ALL_CORE FT
RUE elbow pain on extension s/p recent balloon angioplasty. No evidence of steal syndrome. Digits warm and perfused. Palpable thrill  over fistula and palpable radial pulse. CT scan demonstrates no evidence of bleeding, hematoma, arterial occlusion s/p fistulogram at [facility].     Difficult to extend R arm at elbow secondary to pain, but able to flex with elbow without pain.     No vascular intervention at this time  Orthopedic surgery to assess elbow pain

## 2024-09-07 NOTE — ED ADULT NURSE NOTE - CHIEF COMPLAINT QUOTE
BIBA, s/p procedure on last SAT for fistula balloon, since then unable to starch to right arm, pain and warm to touch, dialysis M/W/F

## 2024-09-07 NOTE — CONSULT NOTE ADULT - SUBJECTIVE AND OBJECTIVE BOX
HPI: 44M PMH obesity, DM, bipolar disorder, schizophrenia, ESRD on HD, RUE AVF presenting with RUE pain after possible balloon angioplasty of the R AVF on Saturday at his outpatient clinic [facility]. Per the patient, he was experiencing "leakage" during HD sessions warranting intervention by his vascular team. Patient states that he had pain and ecchymosis the day of the procedure, has tried OTC pain medications, cold compresses and elevation with little to no relief. Patient has a history of infected L AVF which was excised in November 2022. he denies numbness, tingling, fevers, chills.       PAST MEDICAL & SURGICAL HISTORY:  Morbid obesity with BMI of 40.0-44.9, adult      ESRD on dialysis      Bipolar disorder      DM (diabetes mellitus)      HTN (hypertension)      Migraine      COPD (chronic obstructive pulmonary disease)      Renal failure      Asthma with COPD      Diabetes mellitus, type 2      Hypertension      Schizophrenia      Anxiety      Schizophrenia      COPD (chronic obstructive pulmonary disease)      HTN (hypertension)      ESRD on dialysis      HLD (hyperlipidemia)      H/O hernia repair      S/P arteriovenous (AV) fistula creation  right side          MEDICATIONS  (STANDING):  morphine  - Injectable 4 milliGRAM(s) IV Push once    MEDICATIONS  (PRN):      Allergies    aspirin (Swelling)  shellfish (Anaphylaxis)  Wallace (Anaphylaxis)  penicillin (Anaphylaxis)  Seafood (Hives)  penicillins (Swelling)  shellfish (Rash)  fish (Unknown)    Intolerances        ROS: Negative except per HPI.     SOCIAL HISTORY:  Smoking history   ETOH history    OBJECTIVE:    Vital Signs Last 24 Hrs  T(C): 36.9 (07 Sep 2024 23:18), Max: 37.1 (07 Sep 2024 20:25)  T(F): 98.5 (07 Sep 2024 23:18), Max: 98.7 (07 Sep 2024 20:25)  HR: 71 (07 Sep 2024 23:18) (71 - 75)  BP: 186/87 (07 Sep 2024 23:18) (160/84 - 186/87)  BP(mean): 120 (07 Sep 2024 23:18) (120 - 120)  RR: 16 (07 Sep 2024 23:18) (16 - 16)  SpO2: 99% (07 Sep 2024 23:18) (96% - 99%)    Parameters below as of 07 Sep 2024 23:18  Patient On (Oxygen Delivery Method): nasal cannula  O2 Flow (L/min): 5      I&O's Summary      LABS:                        9.3    4.74  )-----------( 141      ( 07 Sep 2024 23:15 )             28.0               CAPILLARY BLOOD GLUCOSE            Cultures:      PHYSICAL EXAM:   General: AOx3, NAD.   HEENT: NC/AT. EOMI. Trachea midline.  Cardio: RR  Pulm: Normal chest rise and expansion. Non-labored breathing.  Extremities:   RUE focused   R wrist AVF with thrill. No ecchymosis, erythema or areas of fluctuance appreciated. Strength and sensation intact. Palpable brachial pulse. Patient is able to flex the arm, significant pain with extension of the arm.  No edema or swelling noted b/l.  No calf tenderness b/l.     RADIOLOGY & ADDITIONAL STUDIES:

## 2024-09-07 NOTE — CONSULT NOTE ADULT - ASSESSMENT
44M with RUE AVF s/p angioplasty 7 days ago with persistent RUE pain     PLAN   - Recommend obtaining imaging of RUE for further evaluation , r/o possible hematoma   - No acute surgical intervention   - Pain control   - Elevate extremity     Discussed with attending on call  44M with RUE AVF s/p angioplasty 7 days ago with persistent RUE pain     PLAN   - Recommend obtaining imaging of RUE for further evaluation , r/o possible hematoma   - No acute surgical intervention   - Pain control   - Elevate extremity     Discussed with attending on call     ADDENDUM 05:34  CT results reviewed with attending, no evidence of contrast extravasation or hematoma on preliminary read  Would recommend orthopedic consult for evaluation of possible stiff elbow which may occur in some patients post HD    44M with RUE AVF s/p angioplasty 7 days ago with persistent RUE pain     PLAN   - Recommend obtaining imaging of RUE for further evaluation , r/o possible hematoma   - No acute surgical intervention   - Pain control   - Elevate extremity     Discussed with attending on call     ADDENDUM 05:34  CT results reviewed with attending, no evidence of contrast extravasation or hematoma on preliminary read  Upon reassessment, patient's RUE AVF with thrill. No ecchymosis, erythema or areas of fluctuance appreciated. Strength and sensation intact distally. Distal extremity warm, well perfused. Pain with extension of arm at elbow.   Would recommend orthopedic consult for evaluation of possible stiff elbow which may occur in some patients post HD    44M with RUE AVF s/p angioplasty 7 days ago with persistent RUE pain     PLAN   - Recommend obtaining imaging of RUE for further evaluation , r/o possible hematoma   - No acute surgical intervention   - Pain control   - Elevate extremity     Discussed with attending on call     ADDENDUM 05:34  CT results reviewed with attending, no evidence of contrast extravasation or hematoma on preliminary read  Upon reassessment, patient's RUE AVF with thrill. No ecchymosis, erythema or areas of fluctuance appreciated. Strength and sensation intact distally. Distal extremity warm, well perfused. Pain with extension of arm at elbow.   Would recommend orthopedic consult for evaluation of pain and stiffness at elbow

## 2024-09-07 NOTE — ED ADULT TRIAGE NOTE - CHIEF COMPLAINT QUOTE
BIBA, s/p procedure on last SAT for fistula, since then unable to starch to right arm, pain and warm to touch, dialysis M/W/F BIBA, s/p procedure on last SAT for fistula balloon, since then unable to starch to right arm, pain and warm to touch, dialysis M/W/F

## 2024-09-08 VITALS
TEMPERATURE: 98 F | OXYGEN SATURATION: 98 % | DIASTOLIC BLOOD PRESSURE: 68 MMHG | SYSTOLIC BLOOD PRESSURE: 123 MMHG | HEART RATE: 71 BPM | RESPIRATION RATE: 18 BRPM

## 2024-09-08 DIAGNOSIS — N18.6 END STAGE RENAL DISEASE: ICD-10-CM

## 2024-09-08 DIAGNOSIS — M25.521 PAIN IN RIGHT ELBOW: ICD-10-CM

## 2024-09-08 LAB
ALBUMIN SERPL ELPH-MCNC: 3.5 G/DL — SIGNIFICANT CHANGE UP (ref 3.5–5)
ALP SERPL-CCNC: 188 U/L — HIGH (ref 40–120)
ALT FLD-CCNC: 158 U/L DA — HIGH (ref 10–60)
ANION GAP SERPL CALC-SCNC: 7 MMOL/L — SIGNIFICANT CHANGE UP (ref 5–17)
AST SERPL-CCNC: 105 U/L — HIGH (ref 10–40)
AST SERPL-CCNC: 145 U/L — HIGH (ref 10–40)
BASE EXCESS BLDV CALC-SCNC: 3.2 MMOL/L — SIGNIFICANT CHANGE UP
BASOPHILS # BLD AUTO: 0.01 K/UL — SIGNIFICANT CHANGE UP (ref 0–0.2)
BASOPHILS NFR BLD AUTO: 0.2 % — SIGNIFICANT CHANGE UP (ref 0–2)
BILIRUB SERPL-MCNC: 0.7 MG/DL — SIGNIFICANT CHANGE UP (ref 0.2–1.2)
BLOOD GAS COMMENTS, VENOUS: SIGNIFICANT CHANGE UP
BUN SERPL-MCNC: 68 MG/DL — HIGH (ref 7–18)
CA-I SERPL-SCNC: SIGNIFICANT CHANGE UP MMOL/L (ref 1.15–1.33)
CALCIUM SERPL-MCNC: 9.4 MG/DL — SIGNIFICANT CHANGE UP (ref 8.4–10.5)
CHLORIDE SERPL-SCNC: 98 MMOL/L — SIGNIFICANT CHANGE UP (ref 96–108)
CO2 SERPL-SCNC: 27 MMOL/L — SIGNIFICANT CHANGE UP (ref 22–31)
CREAT SERPL-MCNC: 7.93 MG/DL — HIGH (ref 0.5–1.3)
CRP SERPL-MCNC: 10 MG/L — HIGH
EGFR: 8 ML/MIN/1.73M2 — LOW
EOSINOPHIL # BLD AUTO: 0.14 K/UL — SIGNIFICANT CHANGE UP (ref 0–0.5)
EOSINOPHIL NFR BLD AUTO: 3 % — SIGNIFICANT CHANGE UP (ref 0–6)
ERYTHROCYTE [SEDIMENTATION RATE] IN BLOOD: 49 MM/HR — HIGH (ref 0–15)
GAS PNL BLDV: 132 MMOL/L — LOW (ref 136–145)
GAS PNL BLDV: SIGNIFICANT CHANGE UP
GLUCOSE SERPL-MCNC: 108 MG/DL — HIGH (ref 70–99)
HCO3 BLDV-SCNC: 31 MMOL/L — HIGH (ref 22–29)
HCV AB S/CO SERPL IA: 15.85 S/CO — HIGH (ref 0–0.99)
HCV AB SERPL-IMP: REACTIVE
IMM GRANULOCYTES NFR BLD AUTO: 0.4 % — SIGNIFICANT CHANGE UP (ref 0–0.9)
LACTATE BLDV-MCNC: 0.5 MMOL/L — SIGNIFICANT CHANGE UP (ref 0.5–2)
LACTATE SERPL-SCNC: 0.6 MMOL/L — LOW (ref 0.7–2)
LYMPHOCYTES # BLD AUTO: 0.63 K/UL — LOW (ref 1–3.3)
LYMPHOCYTES # BLD AUTO: 13.3 % — SIGNIFICANT CHANGE UP (ref 13–44)
MAGNESIUM SERPL-MCNC: 2.1 MG/DL — SIGNIFICANT CHANGE UP (ref 1.6–2.6)
MAGNESIUM SERPL-MCNC: 2.1 MG/DL — SIGNIFICANT CHANGE UP (ref 1.6–2.6)
MANUAL SMEAR VERIFICATION: SIGNIFICANT CHANGE UP
MONOCYTES # BLD AUTO: 0.89 K/UL — SIGNIFICANT CHANGE UP (ref 0–0.9)
MONOCYTES NFR BLD AUTO: 18.8 % — HIGH (ref 2–14)
NEUTROPHILS # BLD AUTO: 3.05 K/UL — SIGNIFICANT CHANGE UP (ref 1.8–7.4)
NEUTROPHILS NFR BLD AUTO: 64.3 % — SIGNIFICANT CHANGE UP (ref 43–77)
NRBC # BLD: 0 /100 WBCS — SIGNIFICANT CHANGE UP (ref 0–0)
PCO2 BLDV: 58 MMHG — HIGH (ref 42–55)
PH BLDV: 7.33 — SIGNIFICANT CHANGE UP (ref 7.32–7.43)
PHOSPHATE SERPL-MCNC: 6.8 MG/DL — HIGH (ref 2.5–4.5)
PLAT MORPH BLD: NORMAL — SIGNIFICANT CHANGE UP
PLATELET COUNT - ESTIMATE: ABNORMAL
PO2 BLDV: 91 MMHG — SIGNIFICANT CHANGE UP
POTASSIUM BLDV-SCNC: 5.1 MMOL/L — SIGNIFICANT CHANGE UP (ref 3.5–5.1)
POTASSIUM SERPL-MCNC: 4.6 MMOL/L — SIGNIFICANT CHANGE UP (ref 3.5–5.3)
POTASSIUM SERPL-MCNC: SIGNIFICANT CHANGE UP (ref 3.5–5.3)
POTASSIUM SERPL-SCNC: 4.6 MMOL/L — SIGNIFICANT CHANGE UP (ref 3.5–5.3)
POTASSIUM SERPL-SCNC: SIGNIFICANT CHANGE UP (ref 3.5–5.3)
PROT SERPL-MCNC: 8.3 G/DL — SIGNIFICANT CHANGE UP (ref 6–8.3)
RBC BLD AUTO: NORMAL — SIGNIFICANT CHANGE UP
SAO2 % BLDV: 97.3 % — SIGNIFICANT CHANGE UP
SODIUM SERPL-SCNC: 132 MMOL/L — LOW (ref 135–145)

## 2024-09-08 PROCEDURE — 82803 BLOOD GASES ANY COMBINATION: CPT

## 2024-09-08 PROCEDURE — 36415 COLL VENOUS BLD VENIPUNCTURE: CPT

## 2024-09-08 PROCEDURE — 73201 CT UPPER EXTREMITY W/DYE: CPT | Mod: MC

## 2024-09-08 PROCEDURE — 83605 ASSAY OF LACTIC ACID: CPT

## 2024-09-08 PROCEDURE — 96376 TX/PRO/DX INJ SAME DRUG ADON: CPT | Mod: XU

## 2024-09-08 PROCEDURE — 73201 CT UPPER EXTREMITY W/DYE: CPT | Mod: 26,RT,MC

## 2024-09-08 PROCEDURE — 83735 ASSAY OF MAGNESIUM: CPT

## 2024-09-08 PROCEDURE — 99284 EMERGENCY DEPT VISIT MOD MDM: CPT | Mod: 25

## 2024-09-08 PROCEDURE — 85025 COMPLETE CBC W/AUTO DIFF WBC: CPT

## 2024-09-08 PROCEDURE — 96375 TX/PRO/DX INJ NEW DRUG ADDON: CPT | Mod: XU

## 2024-09-08 PROCEDURE — 87040 BLOOD CULTURE FOR BACTERIA: CPT

## 2024-09-08 PROCEDURE — 86803 HEPATITIS C AB TEST: CPT

## 2024-09-08 PROCEDURE — 86140 C-REACTIVE PROTEIN: CPT

## 2024-09-08 PROCEDURE — 99283 EMERGENCY DEPT VISIT LOW MDM: CPT

## 2024-09-08 PROCEDURE — 86703 HIV-1/HIV-2 1 RESULT ANTBDY: CPT

## 2024-09-08 PROCEDURE — 82330 ASSAY OF CALCIUM: CPT

## 2024-09-08 PROCEDURE — 87521 HEPATITIS C PROBE&RVRS TRNSC: CPT

## 2024-09-08 PROCEDURE — 80053 COMPREHEN METABOLIC PANEL: CPT

## 2024-09-08 PROCEDURE — 84295 ASSAY OF SERUM SODIUM: CPT

## 2024-09-08 PROCEDURE — 96374 THER/PROPH/DIAG INJ IV PUSH: CPT | Mod: XU

## 2024-09-08 PROCEDURE — 84132 ASSAY OF SERUM POTASSIUM: CPT

## 2024-09-08 PROCEDURE — 85652 RBC SED RATE AUTOMATED: CPT

## 2024-09-08 PROCEDURE — 84100 ASSAY OF PHOSPHORUS: CPT

## 2024-09-08 RX ORDER — HYDROMORPHONE HYDROCHLORIDE 2 MG/1
1 TABLET ORAL ONCE
Refills: 0 | Status: DISCONTINUED | OUTPATIENT
Start: 2024-09-08 | End: 2024-09-08

## 2024-09-08 RX ORDER — OXYCODONE AND ACETAMINOPHEN 7.5; 325 MG/1; MG/1
1 TABLET ORAL
Qty: 10 | Refills: 0
Start: 2024-09-08

## 2024-09-08 RX ADMIN — HYDROMORPHONE HYDROCHLORIDE 1 MILLIGRAM(S): 2 TABLET ORAL at 08:28

## 2024-09-08 RX ADMIN — HYDROMORPHONE HYDROCHLORIDE 1 MILLIGRAM(S): 2 TABLET ORAL at 11:28

## 2024-09-08 RX ADMIN — HYDROMORPHONE HYDROCHLORIDE 1 MILLIGRAM(S): 2 TABLET ORAL at 07:58

## 2024-09-08 NOTE — ED PROVIDER NOTE - CLINICAL SUMMARY MEDICAL DECISION MAKING FREE TEXT BOX
44 male with hx of schizophrenia, DM, HTN, COPD on 4L O2, & ESRD on HD MWF with last full HD yesterday..   Patient presenting to the ED reporting pain to right upper extremity.  Patient reports that last Saturday he had a possible balloon angioplasty to RUE AVF at outpatient clinic.  Patient reports that he has been having severe pain since with difficulty moving his arm due to the pain.  No trauma.    Vitals with elevated BP.  Nontoxic appearing, n/v intact.  Airway intact, no respiratory distress, no hypoxia.  + Thrill to aVF.  No overlying erythema or induration of skin.      Plan to obtain:  -Labs, vascular surgery consult, analgesia as needed, observe/reassess    ED Course:  Lab values mild ESR elevation, normal WBC.  Hb unchanged from prior.  CKD similar to prior.  Electrolytes & LFTs hemolyzed and will repeat.  Care discussed with vascular surgery PA and requesting CT with IV contrast to assess for possible abscess or leak.    Repeat labs, CT, & completion of vascular surgery consult pending at time of signout.

## 2024-09-08 NOTE — ED PROVIDER NOTE - PATIENT PORTAL LINK FT
You can access the FollowMyHealth Patient Portal offered by [facility] by registering at the following website: [email or web address]. By joining [employer]’s FollowMyHealth portal, you will also be able to view your health information using other applications (apps) compatible with our system.

## 2024-09-08 NOTE — ED PROVIDER NOTE - PHYSICAL EXAMINATION
Gen:  Awake, alert, NAD, WDWN, NCAT, non-toxic appearing.   Eyes:  PERRL, EOMI, no icterus, normal lids/lashes, normal conjunctivae.  ENT:  External inspection normal, pink/moist membranes.   CV:  S1S2, regular rate and rhythm, no murmur/gallops/rubs, no JVD, 2+ pulses b/l, no edema/cords/homans, good capillary refill, warm/well-perfused. RUE AVF w thrill.  Resp:  Normal respiratory rate/effort, no respiratory distress, normal voice, speaking full sentences, lungs clear to auscultation b/l, no wheezing/rales/rhonchi, no retractions, no stridor.  Abd:  Soft abdomen, obese, no tender/distended/guarding/rebound, no pulsatile mass, no CVA tender.   Musculoskeletal:  N/V intact, FROM all 4 extremities, normal motor tone. R wrist/hand/forearm/elbow: FROM, +pain w ROM, stable joints, no effusion/swelling, no ciro tender/deformity.   Neck:  FROM neck, supple, trachea midline, no meningismus.   Skin:  Color normal for race, warm and dry, no rash. RUE AVF w no overlying erythema, induration, fluctuance, crepitus, bleeding, purulent discharge, or lymphangitic streaking.  Neuro:  Oriented x3, CN 2-12 intact (grossly), normal motor (grossly), normal sensory (grossly), GCS 15  Psych:  Attention normal. Affect normal. Behavior normal. Judgment normal.

## 2024-09-08 NOTE — ED PROVIDER NOTE - NS ED ROS FT
Constitutional: (-) fever (-) chills  HENT: (-) congestion (-) rhinorrhea (-) sore throat  Eyes: (-) pain (-) redness  Respiratory: (-) cough (-) shortness of breath (-) wheezing (-) stridor  Cardiovascular: (-) chest pain (-) palpitations (-) leg swelling  Gastrointestinal: (-) abdominal pain (-) blood in stool (no melena/hematochezia) (-) diarrhea (-) vomiting  Genitourinary: (-) dysuria (-) hematuria  Musculoskeletal: (+) R arm pain  Skin: (-) color change (-) wound  Neurological: (-) weakness (-) numbness (-) headaches  Psychiatric/Behavioral: (-) confusion

## 2024-09-08 NOTE — ED PROVIDER NOTE - OBJECTIVE STATEMENT
44 male with hx of schizophrenia, DM, HTN, COPD on 4L O2, & ESRD on HD MWF with last full HD yesterday..   Patient presenting to the ED reporting pain to right upper extremity.  Patient reports that last Saturday he had a possible balloon angioplasty to RUE AVF at outpatient clinic.  Patient reports that he has been having severe pain since with difficulty moving his arm due to the pain.  No trauma.

## 2024-09-08 NOTE — ED PROVIDER NOTE - PROGRESS NOTE DETAILS
Denita (Alexandre Luna DO spoke to surgery PA conveyed concerns for compartment syndrome given pain out of proportion. will relay to morning team. Denita Luna (Rodriguez), DO spoke to vascular team, expressed concerns for compartment sydrome. will eval in ED. pulses remain intact. no pallor or coolness to the extremity at this time. patient hepatitis came back positive while still a patient in ED.  Patient made aware of findings. Vascular surgery reconsulted and have cleared patient.  Will DC with analgesia and clinic follow-up

## 2024-09-09 LAB
HCV RNA FLD QL NAA+PROBE: SIGNIFICANT CHANGE UP
HCV RNA SPEC QL PROBE+SIG AMP: DETECTED

## 2024-09-11 NOTE — ED PROVIDER NOTE - PATIENT'S SEXUAL ORIENTATION
Heterosexual Quality 47: Advance Care Plan: Advance Care Planning discussed and documented in the medical record; patient did not wish or was not able to name a surrogate decision maker or provide an advance care plan. Quality 226: Preventive Care And Screening: Tobacco Use: Screening And Cessation Intervention: Patient screened for tobacco use and is an ex/non-smoker Quality 130: Documentation Of Current Medications In The Medical Record: Current Medications Documented Detail Level: Detailed

## 2024-09-24 NOTE — DISCHARGE NOTE PROVIDER - NSDCFUSCHEDAPPT_GEN_ALL_CORE_FT
To get better and follow your care plan as instructed. PRESTON JOHNSON ; 01/03/2020 ; [facility] [address]  PRESTON JOHNSON ; 01/03/2020 ; NPP [facility] [address] [address]

## 2024-10-10 ENCOUNTER — NON-APPOINTMENT (OUTPATIENT)
Age: 44
End: 2024-10-10

## 2024-10-10 ENCOUNTER — APPOINTMENT (OUTPATIENT)
Dept: PSYCHIATRY | Facility: CLINIC | Age: 44
End: 2024-10-10
Payer: MEDICARE

## 2024-10-10 VITALS — WEIGHT: 315 LBS | BODY MASS INDEX: 40.22 KG/M2

## 2024-10-10 PROCEDURE — 90832 PSYTX W PT 30 MINUTES: CPT | Mod: 2W

## 2024-10-15 NOTE — DIETITIAN INITIAL EVALUATION ADULT. - PROBLEM SELECTOR PLAN 3
Pt has hx of scizophrenia   on risperidone , quitiapine , mirtazapine   will continue all psych meds at home doses
4 = No assist / stand by assistance

## 2024-10-16 ENCOUNTER — INPATIENT (INPATIENT)
Facility: HOSPITAL | Age: 44
LOS: 1 days | Discharge: HOME | DRG: 377 | End: 2024-10-18
Attending: STUDENT IN AN ORGANIZED HEALTH CARE EDUCATION/TRAINING PROGRAM | Admitting: STUDENT IN AN ORGANIZED HEALTH CARE EDUCATION/TRAINING PROGRAM
Payer: MEDICARE

## 2024-10-16 VITALS
HEART RATE: 72 BPM | HEIGHT: 78 IN | RESPIRATION RATE: 20 BRPM | SYSTOLIC BLOOD PRESSURE: 167 MMHG | WEIGHT: 315 LBS | TEMPERATURE: 97 F | DIASTOLIC BLOOD PRESSURE: 100 MMHG | OXYGEN SATURATION: 97 %

## 2024-10-16 DIAGNOSIS — E78.5 HYPERLIPIDEMIA, UNSPECIFIED: ICD-10-CM

## 2024-10-16 DIAGNOSIS — B19.20 UNSPECIFIED VIRAL HEPATITIS C WITHOUT HEPATIC COMA: ICD-10-CM

## 2024-10-16 DIAGNOSIS — F25.9 SCHIZOAFFECTIVE DISORDER, UNSPECIFIED: ICD-10-CM

## 2024-10-16 DIAGNOSIS — J44.9 CHRONIC OBSTRUCTIVE PULMONARY DISEASE, UNSPECIFIED: ICD-10-CM

## 2024-10-16 DIAGNOSIS — E11.9 TYPE 2 DIABETES MELLITUS WITHOUT COMPLICATIONS: ICD-10-CM

## 2024-10-16 DIAGNOSIS — Z29.9 ENCOUNTER FOR PROPHYLACTIC MEASURES, UNSPECIFIED: ICD-10-CM

## 2024-10-16 DIAGNOSIS — K92.2 GASTROINTESTINAL HEMORRHAGE, UNSPECIFIED: ICD-10-CM

## 2024-10-16 DIAGNOSIS — K92.0 HEMATEMESIS: ICD-10-CM

## 2024-10-16 DIAGNOSIS — Z98.890 OTHER SPECIFIED POSTPROCEDURAL STATES: Chronic | ICD-10-CM

## 2024-10-16 DIAGNOSIS — I10 ESSENTIAL (PRIMARY) HYPERTENSION: ICD-10-CM

## 2024-10-16 DIAGNOSIS — N18.6 END STAGE RENAL DISEASE: ICD-10-CM

## 2024-10-16 DIAGNOSIS — E66.01 MORBID (SEVERE) OBESITY DUE TO EXCESS CALORIES: ICD-10-CM

## 2024-10-16 LAB
ALBUMIN SERPL ELPH-MCNC: 3.3 G/DL — LOW (ref 3.5–5)
ALP SERPL-CCNC: 107 U/L — SIGNIFICANT CHANGE UP (ref 40–120)
ALT FLD-CCNC: 59 U/L DA — SIGNIFICANT CHANGE UP (ref 10–60)
ANION GAP SERPL CALC-SCNC: 7 MMOL/L — SIGNIFICANT CHANGE UP (ref 5–17)
APTT BLD: 36.3 SEC — HIGH (ref 24.5–35.6)
AST SERPL-CCNC: 42 U/L — HIGH (ref 10–40)
BASOPHILS # BLD AUTO: 0.01 K/UL — SIGNIFICANT CHANGE UP (ref 0–0.2)
BASOPHILS NFR BLD AUTO: 0.2 % — SIGNIFICANT CHANGE UP (ref 0–2)
BILIRUB SERPL-MCNC: 0.5 MG/DL — SIGNIFICANT CHANGE UP (ref 0.2–1.2)
BLD GP AB SCN SERPL QL: SIGNIFICANT CHANGE UP
BUN SERPL-MCNC: 80 MG/DL — HIGH (ref 7–18)
CALCIUM SERPL-MCNC: 11.3 MG/DL — HIGH (ref 8.4–10.5)
CHLORIDE SERPL-SCNC: 96 MMOL/L — SIGNIFICANT CHANGE UP (ref 96–108)
CO2 SERPL-SCNC: 29 MMOL/L — SIGNIFICANT CHANGE UP (ref 22–31)
CREAT SERPL-MCNC: 6.93 MG/DL — HIGH (ref 0.5–1.3)
EGFR: 9 ML/MIN/1.73M2 — LOW
EOSINOPHIL # BLD AUTO: 0.18 K/UL — SIGNIFICANT CHANGE UP (ref 0–0.5)
EOSINOPHIL NFR BLD AUTO: 4.4 % — SIGNIFICANT CHANGE UP (ref 0–6)
GLUCOSE SERPL-MCNC: 112 MG/DL — HIGH (ref 70–99)
HCT VFR BLD CALC: 31.3 % — LOW (ref 39–50)
HGB BLD-MCNC: 10.5 G/DL — LOW (ref 13–17)
IMM GRANULOCYTES NFR BLD AUTO: 0.5 % — SIGNIFICANT CHANGE UP (ref 0–0.9)
INR BLD: 0.97 RATIO — SIGNIFICANT CHANGE UP (ref 0.85–1.16)
LIDOCAIN IGE QN: 71 U/L — SIGNIFICANT CHANGE UP (ref 13–75)
LYMPHOCYTES # BLD AUTO: 0.49 K/UL — LOW (ref 1–3.3)
LYMPHOCYTES # BLD AUTO: 12.1 % — LOW (ref 13–44)
MAGNESIUM SERPL-MCNC: 1.9 MG/DL — SIGNIFICANT CHANGE UP (ref 1.6–2.6)
MCHC RBC-ENTMCNC: 32.9 PG — SIGNIFICANT CHANGE UP (ref 27–34)
MCHC RBC-ENTMCNC: 33.5 GM/DL — SIGNIFICANT CHANGE UP (ref 32–36)
MCV RBC AUTO: 98.1 FL — SIGNIFICANT CHANGE UP (ref 80–100)
MONOCYTES # BLD AUTO: 0.58 K/UL — SIGNIFICANT CHANGE UP (ref 0–0.9)
MONOCYTES NFR BLD AUTO: 14.3 % — HIGH (ref 2–14)
NEUTROPHILS # BLD AUTO: 2.78 K/UL — SIGNIFICANT CHANGE UP (ref 1.8–7.4)
NEUTROPHILS NFR BLD AUTO: 68.5 % — SIGNIFICANT CHANGE UP (ref 43–77)
NRBC # BLD: 0 /100 WBCS — SIGNIFICANT CHANGE UP (ref 0–0)
PHOSPHATE SERPL-MCNC: 4.6 MG/DL — HIGH (ref 2.5–4.5)
PLATELET # BLD AUTO: 115 K/UL — LOW (ref 150–400)
POTASSIUM SERPL-MCNC: 4.2 MMOL/L — SIGNIFICANT CHANGE UP (ref 3.5–5.3)
POTASSIUM SERPL-SCNC: 4.2 MMOL/L — SIGNIFICANT CHANGE UP (ref 3.5–5.3)
PROT SERPL-MCNC: 8.2 G/DL — SIGNIFICANT CHANGE UP (ref 6–8.3)
PROTHROM AB SERPL-ACNC: 11.3 SEC — SIGNIFICANT CHANGE UP (ref 9.9–13.4)
RBC # BLD: 3.19 M/UL — LOW (ref 4.2–5.8)
RBC # FLD: 14.3 % — SIGNIFICANT CHANGE UP (ref 10.3–14.5)
SODIUM SERPL-SCNC: 132 MMOL/L — LOW (ref 135–145)
WBC # BLD: 4.03 K/UL — SIGNIFICANT CHANGE UP (ref 3.8–10.5)
WBC # FLD AUTO: 4.03 K/UL — SIGNIFICANT CHANGE UP (ref 3.8–10.5)

## 2024-10-16 PROCEDURE — 99285 EMERGENCY DEPT VISIT HI MDM: CPT

## 2024-10-16 PROCEDURE — 76705 ECHO EXAM OF ABDOMEN: CPT | Mod: 26

## 2024-10-16 PROCEDURE — 74176 CT ABD & PELVIS W/O CONTRAST: CPT | Mod: 26,MC

## 2024-10-16 PROCEDURE — 99222 1ST HOSP IP/OBS MODERATE 55: CPT | Mod: GC

## 2024-10-16 PROCEDURE — 71045 X-RAY EXAM CHEST 1 VIEW: CPT | Mod: 26

## 2024-10-16 RX ORDER — OCTREOTIDE ACETATE 50 UG/ML
50 INJECTION, SOLUTION INTRAVENOUS; SUBCUTANEOUS ONCE
Refills: 0 | Status: COMPLETED | OUTPATIENT
Start: 2024-10-16 | End: 2024-10-16

## 2024-10-16 RX ORDER — ONDANSETRON HCL/PF 4 MG/2 ML
4 VIAL (ML) INJECTION EVERY 8 HOURS
Refills: 0 | Status: DISCONTINUED | OUTPATIENT
Start: 2024-10-16 | End: 2024-10-18

## 2024-10-16 RX ORDER — PANTOPRAZOLE SODIUM 40 MG/1
80 TABLET, DELAYED RELEASE ORAL ONCE
Refills: 0 | Status: COMPLETED | OUTPATIENT
Start: 2024-10-16 | End: 2024-10-16

## 2024-10-16 RX ORDER — CEFTRIAXONE SODIUM 1 G
1000 VIAL (EA) INJECTION EVERY 24 HOURS
Refills: 0 | Status: DISCONTINUED | OUTPATIENT
Start: 2024-10-16 | End: 2024-10-16

## 2024-10-16 RX ORDER — INSULIN LISPRO 100/ML
VIAL (ML) SUBCUTANEOUS EVERY 6 HOURS
Refills: 0 | Status: DISCONTINUED | OUTPATIENT
Start: 2024-10-16 | End: 2024-10-17

## 2024-10-16 RX ORDER — OCTREOTIDE ACETATE 50 UG/ML
50 INJECTION, SOLUTION INTRAVENOUS; SUBCUTANEOUS
Qty: 500 | Refills: 0 | Status: DISCONTINUED | OUTPATIENT
Start: 2024-10-16 | End: 2024-10-17

## 2024-10-16 RX ADMIN — PANTOPRAZOLE SODIUM 80 MILLIGRAM(S): 40 TABLET, DELAYED RELEASE ORAL at 17:06

## 2024-10-16 RX ADMIN — OCTREOTIDE ACETATE 50 MICROGRAM(S): 50 INJECTION, SOLUTION INTRAVENOUS; SUBCUTANEOUS at 22:39

## 2024-10-16 RX ADMIN — Medication 4 MILLIGRAM(S): at 22:48

## 2024-10-16 NOTE — PATIENT PROFILE ADULT - FUNCTIONAL ASSESSMENT - DAILY ACTIVITY SECTION LABEL
[FreeTextEntry1] : Donis Murphy is a 30 year old male with a PMH of abnormal EKG, CHF, HTN, heart failure, cardiomyopathy, CRI, JUAN, and past tobacco use.\par \par In 2014 he presented to an urgent care with cough and uncontrolled BP. He was transferred to the hospital and admitted to the ICU. There was LV and renal dysfunction. Told his heart was enlarged. 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 Initially followed at [facility], started on medical therapy. Previously saw nephrology. Previously, he was prescribed carvedilol, enalapril, hydralazine, and isosorbide.\par \par Had high BPs in January. Meds were adjusted and pt did not followup until today. Nifedipine was most recent addition to his regimen. BP improved but remains elevated. 2/2 HTN workup detailed below.  \par \par He is less active since pandemic. No longer attends gym but generally active at work. He denies chest pain, pressure, palpitations, unusual shortness of breath, orthopnea, LE edema, lightheadedness, dizziness, near syncope or syncope. In the interim there have been no hospitalizations or procedures.\par \par \par Echocardiogram demonstrating mild LVH, LVEF 55-60%, JEMMA of MV\par Labs 3/2020 renin, metanephrines, TSH WNL\par Renal US 6/2020 negative for СВЕТЛАНА\par \par Labs 1/14/20: Hgb 17.6, Hct 50.1, platelets 232, BUN 24, Cr 1.49, K 3.9, AST 27, ALT 48, Chol 240, Trig 302, HDL 42, LDl 138, Hgb A1C 5.3, TSH 2.420, C-Reactive Protein, Cardiac 1.02, , Mag 2.1\par \par EKG 1/7/20: demonstrates normal sinus rhythm LVH with nonspecific ST changes\par \par Echocardiogram 2/11/16: Normal LVSF. Normal LV cavity size. Normal LV wall thickness. EF 55-60%.\par \par ETT 10/27/14: Negative exercise EKG. No arrhythmias during exercise. Hypertensive response to exercise. Excellent exercise capacity at 13.1 mets.\par \par Echo 7/16/14: Normal LV size. Overall EF 15-20%. Mild mitral insufficiency. Concentric LVH. Dilated RV and LA.\par \par  .

## 2024-10-16 NOTE — H&P ADULT - ATTENDING COMMENTS
Chest X-Ray  Pending official read; on my read, no infiltrate, consolidation, or effusion.    EKG  Normal Sinus Rhythm, 70 bpm, QTc 436ms, AL 222ms, on my read no ST segment elevation or depression, TWI in aVL, V1    HPI  44 year old male patient with pmhx schizophrenia, DM, HTN, COPD on 4L O2, ESRD (makes minimal urine) on HD MWF, bipolar, cirrhosis 2/2 possible MAYER, hepatitis c untreated, gallstone pancreatitis s/p lap javier who presented to the ER due to black emesis and melena for 2 weeks.  For the past 3 days he has been having melena about once every other day with vomiting of black emesis in the evenings and whenever he tries to eat anything. The patient had dialysis for 2.5 hours yesterday and today (missed Monday session) and had heparin during the dialysis session but otherwise denies anticoagulation or aspirin use. He thinks he may have had an EGD several months ago and thinks it was negative but is unsure. He states he has liver problems including hepatitis c that he is going to see a hepatologist for but has not treated yet. He states he has not yet seen a hepatologist.    Review Of Systems included: + black emesis, + melena, + poor oral intake, + vomiting, + epigastric pain, + mild dizziness, + mild shortness of breath,  No chest pain, no constipation, no fever, no chills, no headache    Physical Exam  General: Awake, Alert, Oriented  Cardiac: RRR  Pulmonary: CTA b/l  Abdominal: Soft, ND, + epigastric tenderness  Extremities: No edema b/l    A/P  # Upper GI Bleed  # 5 mm calcification in the gallbladder fossa possibly a surgical clip on CT  # Gallbladder fossa fluid on CT  > Hx of Cirrhosis 2/2 Possible MAYER  > Hgb 9.3 on 9/7/24 --> 10.5 in the ER 10/16/24  - Cardiac Telemetry Monitoring  - Consult GI  - IV PPI 40mg BID  - SCDs  - NPO  - Serial CBC Q6H to monitor Hgb  - Transfuse for Hgb < 7  - Tylenol prn  - Octreotide drip  - IV Ceftriaxone    # Hx of Cirrhosis 2/2 Possible MAYER  # Hx of hepatitis C untreated  > MELD-Na score of 24  - Can consult Hepatology  - Check Hepatitis C PCR    # Hx of DM  - Insulin Sliding Scale  - Blood Glucose Monitoring  - Can monitor blood glucose for 24 hours before starting long acting insulin if needed    # Hx of schizophrenia, HTN, COPD on 4L O2, ESRD on HD MWF, bipolar  - Continue home medications after med-rec    # DVT PPx  - SCDs    # FEN  - NPO  - Monitor and replete electrolytes as needed  - IV Fluid boluses prn. Avoid standing fluid due to ESRD    Previous Admissions Included  9/8/2024: ER Visit for upper extremity pain  2/3/2024 - 2/6/2024: Dyspnea, Leaking MAURIZIO drain. MAURIZIO drain removed by surgery.  1/25/2024 - 2/2/2024: Pancreatitis. S/p lap javier  11/14/2023: ER Visit for dialysis  11/13/2023: ER Visit for nausea/vomiting, abdominal pain, possible cirrhosis on CT. Pt denies ETOH abuse  2/24/2023 - 2/26/2023: Cellulitis of left lower extremity  11/19/2022 - 12/9/2022: Sepsis, MSSA bacteremia, s/p LUE AV graft excision 11/23 now s/p ALBINO without vegetation, now s/p shiley to permacath conversion. Cirrhosis on CT likely 2/2 MAYER  2/8/2021 - 2/15/2021: av  fistula repair on 2/11, small scrotal bump/swelling for few days which was painful but not erythematous. 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 2/11/21 patient underwent resection of LUE aneurysm, L cephalic to L IJ graft bypass, PC placement by Dr. Foote     Patient case and management was discussed with ER Attending  I did examine all labs (including CBC, PT/INR, APTT, CMP, Mg, Phos, Lipase), imaging, prior notes

## 2024-10-16 NOTE — H&P ADULT - ASSESSMENT
44M with PMH of  schizophrenia, DM, HTN, COPD on 5 L home oxygen, ESRD on HD (M/W/F) presenting with black emesis and melena for past two weeks. Admitted to medicine for upper GI bleed, likely variceal bleed.   44M with PMH of  schizophrenia, DM, HTN, COPD on 5 L home oxygen, ESRD on HD (M/W/F) presenting with black emesis and melena for past two weeks. Admitted to medicine for upper GI bleed, possibly variceal bleed.

## 2024-10-16 NOTE — ED ADULT NURSE NOTE - NS_SISCREENINGSR_GEN_ALL_ED
Negative
Personality issues with cluster B traits.  Of concern however is MRI findings and concern about post ictal phenomena vs. CJD.   This may explain mood lability as well as seizures and even hallucinatory phenomena other than substance use. Would have pt seen by neurology in follow up.

## 2024-10-16 NOTE — ED PROVIDER NOTE - CLINICAL SUMMARY MEDICAL DECISION MAKING FREE TEXT BOX
Addis: 44-year-old male with past medical history schizophrenia, diabetes, hypertension, COPD on 5 L home oxygen, ESRD on hemodialysis (Monday, Wednesday, Friday, missed HD on Monday, had 2.5-hour session yesterday and 2.5-hour session today) presents with black emesis x 3 days.  Patient with multiple episodes of "black vomit" over the past 3 days associated with black tarry stools.  Patient reports 1 episode of hematemesis and 2 episodes of black tarry stool today.  Patient states he received heparin during dialysis, otherwise denies any aspirin or anticoagulation.  Patient reports epigastric abdominal discomfort and mild dizziness, but denies any fevers, headache, vision change, chest pain, difficulty breathing, bloody stools, numbness, focal weakness, rash.  Patient states he produces minimal urine at baseline.  Physical exam per above. Patient hemodynamically stable in NAD, concern for upper GI bleeding. Denies EtOH use or history of cirrhosis. EKG without evidence of signs of hyperkalemia. Will obtain labs, imaging, provide supportive treatment, anticipate admission.

## 2024-10-16 NOTE — ED ADULT TRIAGE NOTE - INTERNATIONAL TRAVEL
alternate food with liquid/check mouth frequently for oral residue/pocketing/crush medication (when feasible)/maintain upright posture during/after eating for 30 mins/oral hygiene/position upright (90 degrees)/small sips/bites
No
alternate food with liquid/check mouth frequently for oral residue/pocketing/crush medication (when feasible)/maintain upright posture during/after eating for 30 mins/no straws/oral hygiene/position upright (90 degrees)/small sips/bites

## 2024-10-16 NOTE — H&P ADULT - PROBLEM SELECTOR PLAN 7
hx of HTN on amlodipine and hydralazine  -patient reports not taking medications for last 2 weeks due to inability to tolerate PO and vomiting  -restart home meds when not NPO

## 2024-10-16 NOTE — H&P ADULT - PROBLEM SELECTOR PLAN 5
CT A/P showing hepatosplenomegaly since 11/2023  -out patient MR L-spine at OSH showed "diffusely low marrow signal intensity with areas of relative  hyperintensity at the superior and inferior ventral corners of the vertebral bodies. Findings are nonspecific and may represent diffuse marrow replacing process such as metastatic disease/myeloma or anemia related red marrow  conversion.  -gamma globulin gap = 4.9  -f/u free light chains, imunnoglobulin panel, SPEP/SOPHIA

## 2024-10-16 NOTE — H&P ADULT - PROBLEM SELECTOR PLAN 1
p/w black emesis and melena for 2 weeks with increased frequency over last 3 days  -Hbg 10.5  -CT A/P w/o contrast: Hepatosplenomegaly. Small nonspecific ascites unchanged.  5 mm calcification in the GB fossa. A small volume of fluid is present in the fossa. The fluid may represent a seroma, biloma or bile leak.  -RUQ US: Hepatomegaly  -Prior CT A/P (1/2024 and 11/2024) showed hepatic nodularity suggestive of cirrhosis  -concern for varices in s/o possible cardiohepatic syndrome vs HCV cirrhosis   -s/p protonix 80mg IV in ED  -start protonix 40mg IV BID  -started octreotide gtt 50 mcg/hr  -Zofran 4mg IV q8 PRN  -NPO for now  -GI consult in AM for EGD p/w black emesis and melena for 2 weeks with increased frequency over last 3 days  -Hbg 10.5  -CT A/P w/o contrast: Hepatosplenomegaly. Small nonspecific ascites unchanged.  5 mm calcification in the GB fossa. A small volume of fluid is present in the fossa. The fluid may represent a seroma, biloma or bile leak.  -RUQ US: Hepatomegaly  -Prior CT A/P (1/2024 and 11/2024) showed hepatic nodularity suggestive of cirrhosis  -concern for varices in s/o possible cardiohepatic syndrome vs HCV cirrhosis   -s/p protonix 80mg IV in ED  -start protonix 40mg IV BID  -started octreotide gtt 50 mcg/hr  -start ceftriaxone 1g qd for SBP ppx  -Zofran 4mg IV q8 PRN  -NPO for now  -GI consult in AM for EGD

## 2024-10-16 NOTE — ED PROVIDER NOTE - PROGRESS NOTE DETAILS
Yvan-DO: CT showing "There is a 5 mm calcification in the gallbladder fossa. A small volume of   fluid is present in the fossa. Given the history of cholecystectomy, the calcification may represent a   dropped stone, possibly infected. The fluid may represent a seroma, biloma or bile leak. Fluid tracking along the ascending colon has increased slightly compared with prior. Pancreatitis unlikely however is   not excluded." Surgery consulted, no intervention at this time. No episodes of hematemesis or BMs in ED> Discussed with hospitalist and MAR re: admission.

## 2024-10-16 NOTE — H&P ADULT - PROBLEM SELECTOR PLAN 2
hx of recent diagnosis of HCV with +screening test in [facility] ED on 9/7/24  -per [facility] GI consult at OSH viral load of 5620 in 2/2024  -not yet started on treatment per patient  -Imaging as above  -, INR 0.97, AST 42, ALT 59, , Tbili 0.5 (LFTs improved from 9/2024)   -f/u HCV quant PCR  -f/u HCV fibrosure  -Hepatology consult in AM

## 2024-10-16 NOTE — H&P ADULT - PROBLEM SELECTOR PLAN 6
hx of COPD on Breztri and Spiriva  -not in exacerbation  -c/w symbicort and spiriva  -c/w albuterol HFA PRN q6   -incentive spirometry

## 2024-10-16 NOTE — H&P ADULT - HISTORY OF PRESENT ILLNESS
44M with PMH of  HCV (dx 2024), schizophrenia, DM, HTN, Diastolic CHF, COPD on 5 L home oxygen, ESRD on HD (M/W/F), chronic cholecystitis s/p cholecystectomy (2023)  presenting with black emesis and melena for past two weeks. Patient reporst multiple episodes of "black vomit" over the past 3 days associated with black tarry stools. Reports at least 1 loose black bowel movement every other day for last 2 weeks and 1 episode of hematemesis and 2 episodes of black tarry stool today. Reports he cannot keep anything down; as soon as he eats something he vomits immediately. Also endorses similar response at times after drinking liquids and reports vomiting has occurred unrelated to food intake as well (e.g. during HD today).  Patient missed HD on Monday but had 2.5-hour session yesterday and 2.5-hour session today and reports he received heparin during dialysis. Otherwise denies any use of aspirin or anticoagulation. Patient complains of epigastric abdominal discomfort and mild dizziness, Denies fever, chills, headache, chest pain, palpitations, shortness of breath, diarrhea, constipation, and dysuria. Patient states he produces minimal urine at baseline.  44M with PMH of  HCV (dx 2/2024), schizophrenia, HTN, Diastolic CHF, COPD on 5 L home oxygen, ESRD on HD (M/W/F), chronic cholecystitis s/p cholecystectomy (2023)  presenting with black emesis and melena for past two weeks. Patient reporst multiple episodes of "black vomit" over the past 3 days associated with black tarry stools. Reports at least 1 loose black bowel movement every other day for last 2 weeks and 1 episode of hematemesis and 2 episodes of black tarry stool today. Reports he cannot keep anything down; as soon as he eats something he vomits immediately. Also endorses similar response at times after drinking liquids and reports vomiting has occurred unrelated to food intake as well (e.g. during HD today).  Patient missed HD on Monday but had 2.5-hour session yesterday and 2.5-hour session today and reports he received heparin during dialysis. Otherwise denies any use of aspirin or anticoagulation. Patient complains of epigastric abdominal discomfort and mild dizziness, Denies fever, chills, headache, chest pain, palpitations, shortness of breath, diarrhea, constipation, and dysuria. Patient states he produces minimal urine at baseline.  44M with PMH of  HCV (dx 2/2024), schizophrenia, HTN, Diastolic CHF, COPD on 5 L home oxygen, ESRD on HD (M/W/F), chronic cholecystitis s/p cholecystectomy (2023)  presenting with black emesis and melena for past two weeks. Patient reporst multiple episodes of "black vomit" over the past 3 days associated with black tarry stools. Reports at least 1 loose black bowel movement every other day for last 2 weeks and 1 episode of hematemesis and 2 episodes of black tarry stool today. Reports he cannot keep anything down; as soon as he eats something he vomits immediately. Also endorses similar response at times after drinking liquids and reports vomiting has occurred unrelated to food intake as well (e.g. during HD today).  Patient missed HD on Monday but had 2.5-hour session yesterday and 2.5-hour session today and reports he received heparin during dialysis. Otherwise denies any use of aspirin or anticoagulation. Patient complains of epigastric abdominal discomfort and mild dizziness, Denies fever, chills, headache, chest pain, palpitations, shortness of breath, diarrhea, constipation, and dysuria. Of note, patient states he produces minimal urine at baseline. Additionally, patient reports not taking any medications for last 2 week due to inability to tolerate PO.

## 2024-10-16 NOTE — H&P ADULT - NSHPPHYSICALEXAM_GEN_ALL_CORE
LOS:     VITALS:   T(C): 35.9 (10-16-24 @ 15:21), Max: 35.9 (10-16-24 @ 15:21)  HR: 72 (10-16-24 @ 15:21) (72 - 72)  BP: 167/100 (10-16-24 @ 15:21) (167/100 - 167/100)  RR: 20 (10-16-24 @ 15:21) (20 - 20)  SpO2: 97% (10-16-24 @ 15:21) (97% - 97%)    GENERAL: NAD, lying in bed comfortably  HEAD:  Atraumatic, Normocephalic  EYES: EOMI, PERRLA, conjunctiva and sclera clear  ENT: Moist mucous membranes  NECK: Supple, No JVD  CHEST/LUNG: Clear to auscultation bilaterally; No rales, rhonchi, wheezing, or rubs. Unlabored respirations  HEART: Regular rate and rhythm; No murmurs, rubs, or gallops  ABDOMEN: BSx4; Soft, nontender, nondistended  EXTREMITIES:  2+ Peripheral Pulses, brisk capillary refill. No clubbing, cyanosis, or edema  NERVOUS SYSTEM:  A&Ox3, no focal deficits   SKIN: No rashes or lesions LOS:     VITALS:   T(C): 35.9 (10-16-24 @ 15:21), Max: 35.9 (10-16-24 @ 15:21)  HR: 72 (10-16-24 @ 15:21) (72 - 72)  BP: 167/100 (10-16-24 @ 15:21) (167/100 - 167/100)  RR: 20 (10-16-24 @ 15:21) (20 - 20)  SpO2: 97% (10-16-24 @ 15:21) (97% - 97%)    GENERAL: morbidly obese, NAD, lethargic, lying in bed comfortably  HEAD:  Atraumatic, Normocephalic  EYES: EOMI, PERRLA, conjunctiva and sclera clear  ENT: Moist mucous membranes  NECK: Supple, No JVD  CHEST/LUNG: Clear to auscultation bilaterally; No rales, rhonchi, wheezing, or rubs. Unlabored respirations  HEART: Regular rate and rhythm; III/VI systolic murmur radiating to axilla, no rubs or gallops  ABDOMEN: BSx4; Soft, tender to palpation over epigastrium, mildly distended vs habitus  EXTREMITIES:  2+ Peripheral Pulses, brisk capillary refill. No clubbing, cyanosis, or edema  NERVOUS SYSTEM:  A&Ox3, no focal deficits   SKIN: No rashes or lesions

## 2024-10-16 NOTE — H&P ADULT - PROBLEM SELECTOR PLAN 4
hx of ESRD on HD (M/W/F), makes minimal urine  -last HD 10/14, no signs of fluid overload on exam  -c/w HD as per nephro  -c/w sevelamer TID AC when not NPO  -Renal diet when not NPO  -Nephro consult in AM

## 2024-10-16 NOTE — H&P ADULT - PROBLEM SELECTOR PLAN 3
hx of diastolic CHF on carvedilol.   -last TTE (1/2024): EF 60%, G1DD, mild PHTN, concentric LVH, moderate TR  -significant systolic murmur radiating to axilla on exam concerning for new MR  -f/u TTE   -restart home carvedilol when no longer NPO  -cardiology consult in AM

## 2024-10-16 NOTE — H&P ADULT - PROBLEM SELECTOR PLAN 9
hx of schizophrenia with bipolar  -med rec includes psych meds: fluphenazine, haldol, lexapro, risperdal, seroquel, trazadone, and mirtazipine  -confirm med rec  -restart home psych meds when no longer NPO

## 2024-10-16 NOTE — ED ADULT TRIAGE NOTE - CHIEF COMPLAINT QUOTE
Pt with hx. of ESRD, dialysis M/W/F, c/o vomiting and diarrhea since Monday, and reports didn't complete dialysis on and Monday and Wednesday, and also oxygen dependent 5LNC.

## 2024-10-16 NOTE — ED PROVIDER NOTE - OBJECTIVE STATEMENT
44-year-old male with past medical history schizophrenia, diabetes, hypertension, COPD on 5 L home oxygen, ESRD on hemodialysis (Monday, Wednesday, Friday, missed HD on Monday, had 2.5-hour session yesterday and 2.5-hour session today) presents with black emesis x 3 days.  Patient with multiple episodes of "black vomit" over the past 3 days associated with black tarry stools.  Patient reports 1 episode of hematemesis and 2 episodes of black tarry stool today.  Patient states he received heparin during dialysis, otherwise denies any aspirin or anticoagulation.  Patient reports epigastric abdominal discomfort and mild dizziness, but denies any fevers, headache, vision change, chest pain, difficulty breathing, bloody stools, numbness, focal weakness, rash.  Patient states he produces minimal urine at baseline.  Denies any additional complaints.

## 2024-10-16 NOTE — ED PROVIDER NOTE - PHYSICAL EXAMINATION
CONSTITUTIONAL: non-toxic, well appearing  SKIN: no rash, no petechiae.  EYES: pink conjunctiva, anicteric  ENT: tongue and uvular midline, no exudates, dry mucous membranes  NECK: Supple; no meningismus, no JVD  CARD: RRR, no murmurs, equal radial pulses bilaterally 2+  RESP: Diminished breath sounds bilaterally, no respiratory distress  ABD: Soft, tender over epigastrium, non-distended, no peritoneal signs  EXT: Normal ROM x4. No edema.   NEURO: Alert, oriented. Neuro exam nonfocal.  PSYCH: Cooperative, appropriate.

## 2024-10-17 ENCOUNTER — TRANSCRIPTION ENCOUNTER (OUTPATIENT)
Age: 44
End: 2024-10-17

## 2024-10-17 DIAGNOSIS — R16.2 HEPATOMEGALY WITH SPLENOMEGALY, NOT ELSEWHERE CLASSIFIED: ICD-10-CM

## 2024-10-17 DIAGNOSIS — I50.32 CHRONIC DIASTOLIC (CONGESTIVE) HEART FAILURE: ICD-10-CM

## 2024-10-17 LAB
A1C WITH ESTIMATED AVERAGE GLUCOSE RESULT: 4.5 % — SIGNIFICANT CHANGE UP (ref 4–5.6)
ALBUMIN SERPL ELPH-MCNC: 3.4 G/DL — LOW (ref 3.5–5)
ALP SERPL-CCNC: 113 U/L — SIGNIFICANT CHANGE UP (ref 40–120)
ALT FLD-CCNC: 61 U/L DA — HIGH (ref 10–60)
ANION GAP SERPL CALC-SCNC: 6 MMOL/L — SIGNIFICANT CHANGE UP (ref 5–17)
AST SERPL-CCNC: 44 U/L — HIGH (ref 10–40)
BASOPHILS # BLD AUTO: 0.01 K/UL — SIGNIFICANT CHANGE UP (ref 0–0.2)
BASOPHILS NFR BLD AUTO: 0.3 % — SIGNIFICANT CHANGE UP (ref 0–2)
BILIRUB DIRECT SERPL-MCNC: 0.3 MG/DL — SIGNIFICANT CHANGE UP (ref 0–0.3)
BILIRUB INDIRECT FLD-MCNC: 0.3 MG/DL — SIGNIFICANT CHANGE UP (ref 0.2–1)
BILIRUB SERPL-MCNC: 0.6 MG/DL — SIGNIFICANT CHANGE UP (ref 0.2–1.2)
BUN SERPL-MCNC: 89 MG/DL — HIGH (ref 7–18)
CALCIUM SERPL-MCNC: 11.8 MG/DL — HIGH (ref 8.4–10.5)
CHLORIDE SERPL-SCNC: 96 MMOL/L — SIGNIFICANT CHANGE UP (ref 96–108)
CO2 SERPL-SCNC: 29 MMOL/L — SIGNIFICANT CHANGE UP (ref 22–31)
CREAT SERPL-MCNC: 8.17 MG/DL — HIGH (ref 0.5–1.3)
EGFR: 8 ML/MIN/1.73M2 — LOW
EOSINOPHIL # BLD AUTO: 0.18 K/UL — SIGNIFICANT CHANGE UP (ref 0–0.5)
EOSINOPHIL NFR BLD AUTO: 5 % — SIGNIFICANT CHANGE UP (ref 0–6)
ESTIMATED AVERAGE GLUCOSE: 82 MG/DL — SIGNIFICANT CHANGE UP (ref 68–114)
GLUCOSE BLDC GLUCOMTR-MCNC: 100 MG/DL — HIGH (ref 70–99)
GLUCOSE BLDC GLUCOMTR-MCNC: 136 MG/DL — HIGH (ref 70–99)
GLUCOSE BLDC GLUCOMTR-MCNC: 91 MG/DL — SIGNIFICANT CHANGE UP (ref 70–99)
GLUCOSE BLDC GLUCOMTR-MCNC: 93 MG/DL — SIGNIFICANT CHANGE UP (ref 70–99)
GLUCOSE BLDC GLUCOMTR-MCNC: 99 MG/DL — SIGNIFICANT CHANGE UP (ref 70–99)
GLUCOSE SERPL-MCNC: 107 MG/DL — HIGH (ref 70–99)
HCT VFR BLD CALC: 32.5 % — LOW (ref 39–50)
HCT VFR BLD CALC: 33.1 % — LOW (ref 39–50)
HCV RNA SERPL NAA DL=5-ACNC: SIGNIFICANT CHANGE UP IU/ML
HCV RNA SPEC NAA+PROBE-LOG IU: 5.47 LOGIU/ML — SIGNIFICANT CHANGE UP
HCV RNA SPEC NAA+PROBE-LOG IU: DETECTED
HGB BLD-MCNC: 10.6 G/DL — LOW (ref 13–17)
HGB BLD-MCNC: 10.8 G/DL — LOW (ref 13–17)
IGA FLD-MCNC: 232 MG/DL — SIGNIFICANT CHANGE UP (ref 84–499)
IGG FLD-MCNC: 2108 MG/DL — HIGH (ref 610–1660)
IGM SERPL-MCNC: 108 MG/DL — SIGNIFICANT CHANGE UP (ref 35–242)
IMM GRANULOCYTES NFR BLD AUTO: 0.3 % — SIGNIFICANT CHANGE UP (ref 0–0.9)
KAPPA LC SER QL IFE: 18.69 MG/DL — HIGH (ref 0.33–1.94)
KAPPA/LAMBDA FREE LIGHT CHAIN RATIO, SERUM: 1.06 RATIO — SIGNIFICANT CHANGE UP (ref 0.26–1.65)
LAMBDA LC SER QL IFE: 17.64 MG/DL — HIGH (ref 0.57–2.63)
LYMPHOCYTES # BLD AUTO: 0.5 K/UL — LOW (ref 1–3.3)
LYMPHOCYTES # BLD AUTO: 13.9 % — SIGNIFICANT CHANGE UP (ref 13–44)
MAGNESIUM SERPL-MCNC: 2.2 MG/DL — SIGNIFICANT CHANGE UP (ref 1.6–2.6)
MCHC RBC-ENTMCNC: 32.3 PG — SIGNIFICANT CHANGE UP (ref 27–34)
MCHC RBC-ENTMCNC: 32.6 GM/DL — SIGNIFICANT CHANGE UP (ref 32–36)
MCHC RBC-ENTMCNC: 32.6 GM/DL — SIGNIFICANT CHANGE UP (ref 32–36)
MCHC RBC-ENTMCNC: 32.6 PG — SIGNIFICANT CHANGE UP (ref 27–34)
MCV RBC AUTO: 100 FL — SIGNIFICANT CHANGE UP (ref 80–100)
MCV RBC AUTO: 99.1 FL — SIGNIFICANT CHANGE UP (ref 80–100)
MONOCYTES # BLD AUTO: 0.47 K/UL — SIGNIFICANT CHANGE UP (ref 0–0.9)
MONOCYTES NFR BLD AUTO: 13 % — SIGNIFICANT CHANGE UP (ref 2–14)
NEUTROPHILS # BLD AUTO: 2.44 K/UL — SIGNIFICANT CHANGE UP (ref 1.8–7.4)
NEUTROPHILS NFR BLD AUTO: 67.5 % — SIGNIFICANT CHANGE UP (ref 43–77)
NRBC # BLD: 0 /100 WBCS — SIGNIFICANT CHANGE UP (ref 0–0)
NRBC # BLD: 0 /100 WBCS — SIGNIFICANT CHANGE UP (ref 0–0)
PHOSPHATE SERPL-MCNC: 6 MG/DL — HIGH (ref 2.5–4.5)
PLATELET # BLD AUTO: 110 K/UL — LOW (ref 150–400)
PLATELET # BLD AUTO: 129 K/UL — LOW (ref 150–400)
POTASSIUM SERPL-MCNC: 4.4 MMOL/L — SIGNIFICANT CHANGE UP (ref 3.5–5.3)
POTASSIUM SERPL-SCNC: 4.4 MMOL/L — SIGNIFICANT CHANGE UP (ref 3.5–5.3)
PROT SERPL-MCNC: 8.3 G/DL — SIGNIFICANT CHANGE UP (ref 6–8.3)
RBC # BLD: 3.28 M/UL — LOW (ref 4.2–5.8)
RBC # BLD: 3.31 M/UL — LOW (ref 4.2–5.8)
RBC # FLD: 14.1 % — SIGNIFICANT CHANGE UP (ref 10.3–14.5)
RBC # FLD: 14.3 % — SIGNIFICANT CHANGE UP (ref 10.3–14.5)
SODIUM SERPL-SCNC: 131 MMOL/L — LOW (ref 135–145)
WBC # BLD: 3.61 K/UL — LOW (ref 3.8–10.5)
WBC # BLD: 4.66 K/UL — SIGNIFICANT CHANGE UP (ref 3.8–10.5)
WBC # FLD AUTO: 3.61 K/UL — LOW (ref 3.8–10.5)
WBC # FLD AUTO: 4.66 K/UL — SIGNIFICANT CHANGE UP (ref 3.8–10.5)

## 2024-10-17 PROCEDURE — 88305 TISSUE EXAM BY PATHOLOGIST: CPT | Mod: 26

## 2024-10-17 PROCEDURE — 43239 EGD BIOPSY SINGLE/MULTIPLE: CPT

## 2024-10-17 PROCEDURE — 99222 1ST HOSP IP/OBS MODERATE 55: CPT | Mod: FS,25,GC

## 2024-10-17 PROCEDURE — 99233 SBSQ HOSP IP/OBS HIGH 50: CPT | Mod: GC

## 2024-10-17 PROCEDURE — 88312 SPECIAL STAINS GROUP 1: CPT | Mod: 26

## 2024-10-17 RX ORDER — CEFTRIAXONE SODIUM 1 G
1000 VIAL (EA) INJECTION EVERY 24 HOURS
Refills: 0 | Status: DISCONTINUED | OUTPATIENT
Start: 2024-10-17 | End: 2024-10-18

## 2024-10-17 RX ORDER — INSULIN LISPRO 100/ML
VIAL (ML) SUBCUTANEOUS AT BEDTIME
Refills: 0 | Status: DISCONTINUED | OUTPATIENT
Start: 2024-10-17 | End: 2024-10-18

## 2024-10-17 RX ORDER — PANTOPRAZOLE SODIUM 40 MG/1
40 TABLET, DELAYED RELEASE ORAL
Refills: 0 | Status: DISCONTINUED | OUTPATIENT
Start: 2024-10-17 | End: 2024-10-18

## 2024-10-17 RX ORDER — TIOTROPIUM BROMIDE INHALATION SPRAY 3.12 UG/1
2 SPRAY, METERED RESPIRATORY (INHALATION) DAILY
Refills: 0 | Status: DISCONTINUED | OUTPATIENT
Start: 2024-10-17 | End: 2024-10-18

## 2024-10-17 RX ORDER — ERYTHROPOIETIN 10000 [IU]/ML
10000 INJECTION, SOLUTION INTRAVENOUS; SUBCUTANEOUS
Refills: 0 | Status: DISCONTINUED | OUTPATIENT
Start: 2024-10-17 | End: 2024-10-18

## 2024-10-17 RX ORDER — CRANBERRY FRUIT EXTRACT 650 MG
1 CAPSULE ORAL
Refills: 0 | DISCHARGE

## 2024-10-17 RX ORDER — AMLODIPINE BESYLATE 5 MG
5 TABLET ORAL ONCE
Refills: 0 | Status: COMPLETED | OUTPATIENT
Start: 2024-10-17 | End: 2024-10-17

## 2024-10-17 RX ORDER — PANTOPRAZOLE SODIUM 40 MG/1
40 TABLET, DELAYED RELEASE ORAL
Refills: 0 | Status: DISCONTINUED | OUTPATIENT
Start: 2024-10-17 | End: 2024-10-17

## 2024-10-17 RX ORDER — INSULIN LISPRO 100/ML
VIAL (ML) SUBCUTANEOUS
Refills: 0 | Status: DISCONTINUED | OUTPATIENT
Start: 2024-10-17 | End: 2024-10-18

## 2024-10-17 RX ORDER — FLUTICASONE PROPION/SALMETEROL 100-50 MCG
1 BLISTER, WITH INHALATION DEVICE INHALATION
Refills: 0 | Status: DISCONTINUED | OUTPATIENT
Start: 2024-10-17 | End: 2024-10-18

## 2024-10-17 RX ORDER — INSULIN LISPRO 100/ML
VIAL (ML) SUBCUTANEOUS
Refills: 0 | Status: DISCONTINUED | OUTPATIENT
Start: 2024-10-17 | End: 2024-10-17

## 2024-10-17 RX ORDER — CINACALCET 60 MG/1
1 TABLET, FILM COATED ORAL
Refills: 0 | DISCHARGE

## 2024-10-17 RX ADMIN — Medication 100 MILLIGRAM(S): at 02:12

## 2024-10-17 RX ADMIN — OCTREOTIDE ACETATE 10 MICROGRAM(S)/HR: 50 INJECTION, SOLUTION INTRAVENOUS; SUBCUTANEOUS at 11:46

## 2024-10-17 RX ADMIN — Medication 1 DOSE(S): at 22:41

## 2024-10-17 RX ADMIN — OCTREOTIDE ACETATE 10 MICROGRAM(S)/HR: 50 INJECTION, SOLUTION INTRAVENOUS; SUBCUTANEOUS at 00:10

## 2024-10-17 RX ADMIN — TIOTROPIUM BROMIDE INHALATION SPRAY 2 PUFF(S): 3.12 SPRAY, METERED RESPIRATORY (INHALATION) at 11:46

## 2024-10-17 RX ADMIN — Medication 5 MILLIGRAM(S): at 22:40

## 2024-10-17 RX ADMIN — Medication 1 DOSE(S): at 11:46

## 2024-10-17 NOTE — PROGRESS NOTE ADULT - ATTENDING COMMENTS
#?UGIB   #New HCV   #Type 2 DM   #Schizophrenia   #Hx liver cirrhosis   #Elevated gamma globulin gap   #COPD on 4LNC   #ESRD on HD (MWF)  #Bipolar disorder  #DVT ppx     44yM with PMH of schizophrenia, DM, HTN, COPD on 4L O2, ESRD (makes minimal urine) on HD MWF, bipolar, cirrhosis 2/2 possible MAYER, hepatitis c untreated, gallstone pancreatitis s/p lap javier, who presented from home with dark colored stool and coffee-ground emesis x3 days prior to admission. Admitted for further workup. Patient seen at bedside, resting in bed. Denies any bowel movements, but reports that his last episode of emesis was --. States he has been off his meds for the last 2 days as he cannot tolerate PO intake.     PE: as above; vitals reviewed, NAD, obese, abdomen soft/nontender, A+Ox3, no le edema   Labs reviewed: CBC, BMP, Mg, Phos; monitor daily    -hgb stable   -s/p EGD 10/17, negative for active bleed - no indication for colonoscopy    -seen by surgery, no acute surgical intervention   -c/w PRN Zofran; started protonix PO  -resumed diet, advance as tolerated  -dc ctx, octreotide  -ACHS FS, ISS  -now that patient is on a diet, resume home meds, including psych meds   -newly diagnosed HCV - GI following   -per GI, uremia could be a cause of coffee-ground emesis - nephro following  -nephro Dr. La following, on MWF HD schedule   -f/u SPEP, free light chains - will consult heme/onc if suspicion for MM  -f/u TTE  -currently on 5LNC, titrate to keep saturation >92%

## 2024-10-17 NOTE — CONSULT NOTE ADULT - ASSESSMENT
Patient is a 44y Male with H/O schizophrenia, HTN, obesity, ESRD on HD MWF at [facility]. Also with untreated hepatitis C. Presents to the ED with complains of black vomit for 3 days with episodes of hematemesis on the day of presentation. He was dialyzed on Tuesday and wednesday. Denies any SOB, chest pain or leg swelling.   Hemoglobin is 10.5. CT abdomen revealed hepato-splenomegally.  renal consulted for ESRD.  # ESRD on HD MWF. Admitted with upper Gastrologist bleed. WIll plan for HD in AM  # ANEMIA OF Chronic Kidney Disease Stage and UPPER Gastrointestinal bleeding  retacrit on HD. transfuse as needed to keep HGB >7. gastroenterology conult  # renal osteodystrophy. phos elevated. start phos binders when on a diet. renal diet  # hepatitis C. MX per hepatology    thanks for the consult 
44M with ESRD with black emesis and black stools    Recommend GI consult for endoscopy/colonoscopy  No surgical intervention warranted at this time  Discussed with Dr. Bueno

## 2024-10-17 NOTE — PROGRESS NOTE ADULT - NS ATTEND AMEND GEN_ALL_CORE FT
CT report indicating possible gallstone - the calcification is the surgical clips. Unclear of the cause of gb fossa changes, but likely post surgical changes. The patient had a drain postop and bile leak is extremely unlikely.     Plan per GI

## 2024-10-17 NOTE — PROGRESS NOTE ADULT - PROBLEM SELECTOR PLAN 1
p/w black emesis and melena for 2 weeks with increased frequency over last 3 days  -Hbg 10.5  -CT A/P w/o contrast: Hepatosplenomegaly. Small nonspecific ascites unchanged.  5 mm calcification in the GB fossa. A small volume of fluid is present in the fossa. The fluid may represent a seroma, biloma or bile leak.  -RUQ US: Hepatomegaly  -Prior CT A/P (1/2024 and 11/2024) showed hepatic nodularity suggestive of cirrhosis  -concern for varices in s/o possible cardiohepatic syndrome vs HCV cirrhosis   -s/p protonix 80mg IV in ED  -start protonix 40mg IV BID  -started octreotide gtt 50 mcg/hr  -start ceftriaxone 1g qd for SBP ppx  -Zofran 4mg IV q8 PRN  -NPO for now  -GI consult in AM for EGD p/w black emesis and melena for 2 weeks with increased frequency over last 3 days  -Hbg 10.5  -CT A/P w/o contrast: Hepatosplenomegaly. Small nonspecific ascites unchanged.  5 mm calcification in the GB fossa. A small volume of fluid is present in the fossa. The fluid may represent a seroma, biloma or bile leak.  -RUQ US: Hepatomegaly  -Prior CT A/P (1/2024 and 11/2024) showed hepatic nodularity suggestive of cirrhosis  -concern for varices in s/o possible cardiohepatic syndrome vs HCV cirrhosis   -s/p protonix 80mg IV in ED  -start protonix 40mg IV BID  -started octreotide gtt 50 mcg/hr  -start ceftriaxone 1g qd for SBP ppx  -Zofran 4mg IV q8 PRN  -NPO for now  -EGD today per GI

## 2024-10-17 NOTE — PROGRESS NOTE ADULT - SUBJECTIVE AND OBJECTIVE BOX
PGY-1 Progress Note discussed with attending    PAGER #: [[phone]] TILL 5:00 PM  PLEASE CONTACT ON CALL TEAM:  - On Call Team (Please refer to Janine) FROM 5:00 PM - 8:30PM  - Nightfloat Team FROM 8:30 -7:30 AM      INTERVAL HPI/OVERNIGHT EVENTS:     ---------------------------    REVIEW OF SYSTEMS:  CONSTITUTIONAL: (+) fatigue and weight loss (intentional loss of 29 lbs over the last year); No fever or chills  RESPIRATORY: No cough, wheezing, hemoptysis, or shortness of breath  CARDIOVASCULAR: No chest pain, palpitations, dizziness, or leg swelling  GASTROINTESTINAL: No abdominal pain. No nausea, vomiting, or hematemesis; No diarrhea or constipation. No melena or hematochezia.  GENITOURINARY: No dysuria or hematuria, urinary frequency  NEUROLOGICAL: No headaches, loss of strength, numbness, or tremors  SKIN: No itching, burning, rashes, or lesions     MEDICATIONS  (STANDING):  cefTRIAXone   IVPB 1000 milliGRAM(s) IV Intermittent every 24 hours  fluticasone propionate/ salmeterol 250-50 MICROgram(s) Diskus 1 Dose(s) Inhalation two times a day  insulin lispro (ADMELOG) corrective regimen sliding scale   SubCutaneous every 6 hours  octreotide  Infusion 50 MICROgram(s)/Hr (10 mL/Hr) IV Continuous <Continuous>  pantoprazole  Injectable 40 milliGRAM(s) IV Push two times a day  tiotropium 2.5 MICROgram(s) Inhaler 2 Puff(s) Inhalation daily    MEDICATIONS  (PRN):  EPINEPHrine     1 mG/mL Injectable 0.3 milliGRAM(s) IntraMuscular once PRN Anaphylaxsis  ondansetron Injectable 4 milliGRAM(s) IV Push every 8 hours PRN Nausea and/or Vomiting      Vital Signs Last 24 Hrs  T(C): 36.7 (17 Oct 2024 05:00), Max: 37.2 (16 Oct 2024 22:29)  T(F): 98.1 (17 Oct 2024 05:00), Max: 98.9 (16 Oct 2024 22:29)  HR: 71 (17 Oct 2024 05:00) (70 - 73)  BP: 128/57 (17 Oct 2024 05:00) (128/57 - 172/95)  BP(mean): 80 (17 Oct 2024 05:00) (80 - 80)  RR: 18 (17 Oct 2024 05:00) (18 - 20)  SpO2: 99% (17 Oct 2024 05:00) (97% - 99%)    Parameters below as of 17 Oct 2024 05:00  Patient On (Oxygen Delivery Method): nasal cannula  O2 Flow (L/min): 5      -----------------------------    PHYSICAL EXAMINATION:  GENERAL: Morbidly obese, NAD, lethargic, lying in bed comfortably  HEAD:  Atraumatic, Normocephalic  EYES: EOMI, PERRLA, conjunctiva and sclera clear  ENT: Moist mucous membranes  NECK: Supple, No JVD  CHEST/LUNG: Clear to auscultation bilaterally; No rales, rhonchi, wheezing, or rubs; Unlabored respirations  HEART: Regular rate and rhythm; III/VI systolic murmur radiating to axilla, no rubs or gallops  ABDOMEN: BSx4; Soft, tender to palpation over epigastrium, mildly distended vs habitus  EXTREMITIES:  2+ Peripheral Pulses, brisk capillary refill. No clubbing, cyanosis, or edema  NERVOUS SYSTEM:  A&Ox3, no focal deficits   SKIN: Intact; No rashes or lesions                          10.8   3.61  )-----------( 110      ( 17 Oct 2024 07:55 )             33.1     10-16    132[L]  |  96  |  80[H]  ----------------------------<  112[H]  4.2   |  29  |  6.93[H]    Ca    11.3[H]      16 Oct 2024 16:26  Phos  4.6     10-16  Mg     1.9     10-16    TPro  8.2  /  Alb  3.3[L]  /  TBili  0.5  /  DBili  x   /  AST  42[H]  /  ALT  59  /  AlkPhos  107  10-16    LIVER FUNCTIONS - ( 16 Oct 2024 16:26 )  Alb: 3.3 g/dL / Pro: 8.2 g/dL / ALK PHOS: 107 U/L / ALT: 59 U/L DA / AST: 42 U/L / GGT: x               PT/INR - ( 16 Oct 2024 16:26 )   PT: 11.3 sec;   INR: 0.97 ratio         PTT - ( 16 Oct 2024 16:26 )  PTT:36.3 sec    I&O's Summary          CAPILLARY BLOOD GLUCOSE      RADIOLOGY & ADDITIONAL TESTS:                   PGY-1 Progress Note discussed with attending    PAGER #: [[phone]] TILL 5:00 PM  PLEASE CONTACT ON CALL TEAM:  - On Call Team (Please refer to Janine) FROM 5:00 PM - 8:30PM  - Nightfloat Team FROM 8:30 -7:30 AM      INTERVAL HPI/OVERNIGHT EVENTS:   Patient seen and examined at bedside. NAD but lethargic. Reports one episode of black colored hematemesis overnight. Has not had a bowel movement yet. Patient endorses fatigue and abdominal pain, rated a 6/10 in severity today. Denies fever, chills, N/V/D/C, headache, dizziness, SOB, cough, chest pain, palpitations, weakness, melena, or hematemesis today.   ---------------------------    REVIEW OF SYSTEMS:  CONSTITUTIONAL: (+) fatigue and weight loss (intentional loss of 29 lbs over the last year); No fever or chills  RESPIRATORY: No cough, wheezing, hemoptysis, or shortness of breath  CARDIOVASCULAR: No chest pain, palpitations, dizziness, or leg swelling  GASTROINTESTINAL: (+) abdominal pain; No nausea, vomiting, or hematemesis; No diarrhea or constipation. No melena or hematochezia.  GENITOURINARY: No dysuria or hematuria, urinary frequency  NEUROLOGICAL: No headaches, loss of strength, numbness, or tremors  SKIN: No itching, burning, rashes, or lesions     MEDICATIONS  (STANDING):  cefTRIAXone   IVPB 1000 milliGRAM(s) IV Intermittent every 24 hours  fluticasone propionate/ salmeterol 250-50 MICROgram(s) Diskus 1 Dose(s) Inhalation two times a day  insulin lispro (ADMELOG) corrective regimen sliding scale   SubCutaneous every 6 hours  octreotide  Infusion 50 MICROgram(s)/Hr (10 mL/Hr) IV Continuous <Continuous>  pantoprazole  Injectable 40 milliGRAM(s) IV Push two times a day  tiotropium 2.5 MICROgram(s) Inhaler 2 Puff(s) Inhalation daily    MEDICATIONS  (PRN):  EPINEPHrine     1 mG/mL Injectable 0.3 milliGRAM(s) IntraMuscular once PRN Anaphylaxsis  ondansetron Injectable 4 milliGRAM(s) IV Push every 8 hours PRN Nausea and/or Vomiting      Vital Signs Last 24 Hrs  T(C): 36.7 (17 Oct 2024 05:00), Max: 37.2 (16 Oct 2024 22:29)  T(F): 98.1 (17 Oct 2024 05:00), Max: 98.9 (16 Oct 2024 22:29)  HR: 71 (17 Oct 2024 05:00) (70 - 73)  BP: 128/57 (17 Oct 2024 05:00) (128/57 - 172/95)  BP(mean): 80 (17 Oct 2024 05:00) (80 - 80)  RR: 18 (17 Oct 2024 05:00) (18 - 20)  SpO2: 99% (17 Oct 2024 05:00) (97% - 99%)    Parameters below as of 17 Oct 2024 05:00  Patient On (Oxygen Delivery Method): nasal cannula  O2 Flow (L/min): 5      -----------------------------    PHYSICAL EXAMINATION:  GENERAL: Morbidly obese, NAD, lethargic, lying in bed comfortably  HEAD:  Atraumatic, Normocephalic  EYES: EOMI, PERRLA, conjunctiva and sclera clear  ENT: Moist mucous membranes  NECK: Supple, No JVD  CHEST/LUNG: Clear to auscultation bilaterally; No rales, rhonchi, wheezing, or rubs; Unlabored respirations  HEART: Regular rate and rhythm; III/VI systolic murmur radiating to axilla, no rubs or gallops  ABDOMEN: BSx4; Soft, tender to palpation over epigastrium, mildly distended vs habitus  EXTREMITIES:  2+ Peripheral Pulses, brisk capillary refill. No clubbing, cyanosis, or edema  NERVOUS SYSTEM:  A&Ox3, no focal deficits   SKIN: Intact; No rashes or lesions                          10.8   3.61  )-----------( 110      ( 17 Oct 2024 07:55 )             33.1     10-16    132[L]  |  96  |  80[H]  ----------------------------<  112[H]  4.2   |  29  |  6.93[H]    Ca    11.3[H]      16 Oct 2024 16:26  Phos  4.6     10-16  Mg     1.9     10-16    TPro  8.2  /  Alb  3.3[L]  /  TBili  0.5  /  DBili  x   /  AST  42[H]  /  ALT  59  /  AlkPhos  107  10-16    LIVER FUNCTIONS - ( 16 Oct 2024 16:26 )  Alb: 3.3 g/dL / Pro: 8.2 g/dL / ALK PHOS: 107 U/L / ALT: 59 U/L DA / AST: 42 U/L / GGT: x               PT/INR - ( 16 Oct 2024 16:26 )   PT: 11.3 sec;   INR: 0.97 ratio         PTT - ( 16 Oct 2024 16:26 )  PTT:36.3 sec    I&O's Summary          CAPILLARY BLOOD GLUCOSE      RADIOLOGY & ADDITIONAL TESTS:                   PGY-1 Progress Note discussed with attending    PAGER #: [[phone]] TILL 5:00 PM  PLEASE CONTACT ON CALL TEAM:  - On Call Team (Please refer to Janine) FROM 5:00 PM - 8:30PM  - Nightfloat Team FROM 8:30 -7:30 AM      INTERVAL HPI/OVERNIGHT EVENTS:   Patient seen and examined at bedside. NAD but lethargic. Reports one episode of black emesis overnight. States he has not had a bowel movement yet. Patient endorses fatigue and abdominal pain, rated a 6/10 in severity today. Denies fever, chills, N/V/D/C, headache, dizziness, SOB, cough, chest pain, palpitations, weakness, melena, or hematemesis today.   ---------------------------    REVIEW OF SYSTEMS:  CONSTITUTIONAL: (+) fatigue and weight loss (intentional loss of 29 lbs over the last year); No fever or chills  RESPIRATORY: No cough, wheezing, hemoptysis, or shortness of breath  CARDIOVASCULAR: No chest pain, palpitations, dizziness, or leg swelling  GASTROINTESTINAL: (+) abdominal pain; No nausea, vomiting, or hematemesis; No diarrhea or constipation. No melena or hematochezia.  GENITOURINARY: No dysuria or hematuria, urinary frequency  NEUROLOGICAL: No headaches, loss of strength, numbness, or tremors  SKIN: No itching, burning, rashes, or lesions     MEDICATIONS  (STANDING):  cefTRIAXone   IVPB 1000 milliGRAM(s) IV Intermittent every 24 hours  fluticasone propionate/ salmeterol 250-50 MICROgram(s) Diskus 1 Dose(s) Inhalation two times a day  insulin lispro (ADMELOG) corrective regimen sliding scale   SubCutaneous every 6 hours  octreotide  Infusion 50 MICROgram(s)/Hr (10 mL/Hr) IV Continuous <Continuous>  pantoprazole  Injectable 40 milliGRAM(s) IV Push two times a day  tiotropium 2.5 MICROgram(s) Inhaler 2 Puff(s) Inhalation daily    MEDICATIONS  (PRN):  EPINEPHrine     1 mG/mL Injectable 0.3 milliGRAM(s) IntraMuscular once PRN Anaphylaxsis  ondansetron Injectable 4 milliGRAM(s) IV Push every 8 hours PRN Nausea and/or Vomiting      Vital Signs Last 24 Hrs  T(C): 36.7 (17 Oct 2024 05:00), Max: 37.2 (16 Oct 2024 22:29)  T(F): 98.1 (17 Oct 2024 05:00), Max: 98.9 (16 Oct 2024 22:29)  HR: 71 (17 Oct 2024 05:00) (70 - 73)  BP: 128/57 (17 Oct 2024 05:00) (128/57 - 172/95)  BP(mean): 80 (17 Oct 2024 05:00) (80 - 80)  RR: 18 (17 Oct 2024 05:00) (18 - 20)  SpO2: 99% (17 Oct 2024 05:00) (97% - 99%)    Parameters below as of 17 Oct 2024 05:00  Patient On (Oxygen Delivery Method): nasal cannula  O2 Flow (L/min): 5      -----------------------------    PHYSICAL EXAMINATION:  GENERAL: Morbidly obese, NAD, lethargic, lying in bed comfortably  HEAD:  Atraumatic, Normocephalic  EYES: EOMI, PERRLA, conjunctiva and sclera clear  ENT: Moist mucous membranes  NECK: Supple, No JVD  CHEST/LUNG: Clear to auscultation bilaterally; No rales, rhonchi, wheezing, or rubs; Unlabored respirations  HEART: Regular rate and rhythm; III/VI systolic murmur radiating to axilla, no rubs or gallops  ABDOMEN: BSx4; Soft, tender to palpation over epigastrium, mildly distended vs habitus  EXTREMITIES:  2+ Peripheral Pulses, brisk capillary refill. No clubbing, cyanosis, or edema  NERVOUS SYSTEM:  A&Ox3, no focal deficits   SKIN: Intact; No rashes or lesions                          10.8   3.61  )-----------( 110      ( 17 Oct 2024 07:55 )             33.1     10-16    132[L]  |  96  |  80[H]  ----------------------------<  112[H]  4.2   |  29  |  6.93[H]    Ca    11.3[H]      16 Oct 2024 16:26  Phos  4.6     10-16  Mg     1.9     10-16    TPro  8.2  /  Alb  3.3[L]  /  TBili  0.5  /  DBili  x   /  AST  42[H]  /  ALT  59  /  AlkPhos  107  10-16    LIVER FUNCTIONS - ( 16 Oct 2024 16:26 )  Alb: 3.3 g/dL / Pro: 8.2 g/dL / ALK PHOS: 107 U/L / ALT: 59 U/L DA / AST: 42 U/L / GGT: x               PT/INR - ( 16 Oct 2024 16:26 )   PT: 11.3 sec;   INR: 0.97 ratio         PTT - ( 16 Oct 2024 16:26 )  PTT:36.3 sec    I&O's Summary          CAPILLARY BLOOD GLUCOSE      RADIOLOGY & ADDITIONAL TESTS:

## 2024-10-17 NOTE — CONSULT NOTE ADULT - SUBJECTIVE AND OBJECTIVE BOX
[facility] : Progress Note :: [phone], (office [phone]),   Dr La / Dr Hui / Dr Barber / Dr Villalobos / Dr Hang CASTELLANO / Dr Mehta / Dr Sanchez / Dr Alber dumont  _____________________________________________________________________________________________  Patient is a 44y Male with H/O schizophrenia, HTN, obesity, ESRD on HD MWF at [facility] [place]. Also with untreated hepatitis C. Presents to the ED with complains of black vomit for 3 days with episodes of hematemesis on the day of presentation. He was dialyzed on Tuesday and wednesday. Denies any SOB, chest pain or leg swelling.   Hemoglobin is 10.5. CT abdomen revealed hepato-splenomegally.  renal consulted for ESRD.    PAST MEDICAL & SURGICAL HISTORY:  Morbid obesity with BMI of 40.0-44.9, adult      ESRD on dialysis      Bipolar disorder      DM (diabetes mellitus)      HTN (hypertension)      Migraine      COPD (chronic obstructive pulmonary disease)      Renal failure      Asthma with COPD      Diabetes mellitus, type 2      Hypertension      Schizophrenia      Anxiety      Schizophrenia      COPD (chronic obstructive pulmonary disease)      HTN (hypertension)      ESRD on dialysis      HLD (hyperlipidemia)      H/O hernia repair      S/P arteriovenous (AV) fistula creation  right side        penicillins (Swelling)  fish (Unknown)  penicillin (Anaphylaxis)  shellfish (Anaphylaxis)  shellfish (Rash)  Seafood (Hives)  aspirin (Swelling)  [place] (Anaphylaxis)    Home Medications Reviewed  Hospital Medications:   MEDICATIONS  (STANDING):  cefTRIAXone   IVPB 1000 milliGRAM(s) IV Intermittent every 24 hours  fluticasone propionate/ salmeterol 250-50 MICROgram(s) Diskus 1 Dose(s) Inhalation two times a day  insulin lispro (ADMELOG) corrective regimen sliding scale   SubCutaneous at bedtime  insulin lispro (ADMELOG) corrective regimen sliding scale   SubCutaneous three times a day before meals  pantoprazole    Tablet 40 milliGRAM(s) Oral before breakfast  tiotropium 2.5 MICROgram(s) Inhaler 2 Puff(s) Inhalation daily    SOCIAL HISTORY:  Denies ETOh,Smoking,   FAMILY HISTORY:  FH: lymphoma (Sibling)    FH: rheumatoid arthritis (Sibling)    VITALS:  T(F): 98.3 (10-17-24 @ 13:33), Max: 98.9 (10-16-24 @ 22:29)  HR: 73 (10-17-24 @ 14:40)  BP: 169/92 (10-17-24 @ 14:40)  RR: 18 (10-17-24 @ 14:40)  SpO2: 100% (10-17-24 @ 14:40)  Wt(kg): --      PHYSICAL EXAM:  Constitutional: NAD  HEENT: anicteric sclera, oropharynx clear.  Neck: No JVD  Respiratory: CTAB, no wheezes, rales or rhonchi  Cardiovascular: S1, S2, RRR  Gastrointestinal: BS+, soft, NT/ND  Extremities: No peripheral edema  Neurological: A/O x 3, no focal deficits.  Vascular Access: RT forearm AVF with thrill and bruit.    LABS:  10-17    131[L]  |  96  |  89[H]  ----------------------------<  107[H]  4.4   |  29  |  8.17[H]    Ca    11.8[H]      17 Oct 2024 07:55  Phos  6.0     10-17  Mg     2.2     10-17    TPro  8.3  /  Alb  3.4[L]  /  TBili  0.6  /  DBili  0.3  /  AST  44[H]  /  ALT  61[H]  /  AlkPhos  113  10-17    Creatinine Trend: 8.17 <--, 6.93 <--                        10.6   4.66  )-----------( 129      ( 17 Oct 2024 16:12 )             32.5     Urine Studies:  Urinalysis Basic - ( 17 Oct 2024 07:55 )    Color:  / Appearance:  / SG:  / pH:   Gluc: 107 mg/dL / Ketone:   / Bili:  / Urobili:    Blood:  / Protein:  / Nitrite:    Leuk Esterase:  / RBC:  / WBC    Sq Epi:  / Non Sq Epi:  / Bacteria:         RADIOLOGY & ADDITIONAL STUDIES:                
Patient is a 44y old  Male who presents with a chief complaint of     HPI: 44M w/ extensive PMHx, PSHx including lap javier and ventral hernia repair, presents with black emesis and black stools for 2 weeks now. Reports he cannot keep anything down; as soon as he eats something he vomits immediately. Also reports the same thing happens with liquids at times. Also reports vomiting unrelated to food intake, for example in dialysis today. REports he is passing flatus and is having a loose black bowel movement every other day. States everything was going well before 2 weeks ago.   Reports he has had an endoscopy in the past  but cannot remember when.     REVIEW OF SYSTEMS:  Negative except stated above in HPI    PAST MEDICAL & SURGICAL HISTORY:  Morbid obesity with BMI of 40.0-44.9, adult  ESRD on dialysis  Bipolar disorder  DM (diabetes mellitus)  HTN (hypertension)  Migraine  COPD (chronic obstructive pulmonary disease)  Renal failure  Asthma with COPD  Diabetes mellitus, type 2  Hypertension  Schizophrenia  Anxiety  Schizophrenia  COPD (chronic obstructive pulmonary disease)  HTN (hypertension)  ESRD on dialysis  HLD (hyperlipidemia)  H/O hernia repair  S/P arteriovenous (AV) fistula creation  right side    Allergies:  penicillins (Swelling)  fish (Unknown)  penicillin (Anaphylaxis)  shellfish (Anaphylaxis)  shellfish (Rash)  Seafood (Hives)  aspirin (Swelling)  [place] (Anaphylaxis)      Vital Signs Last 24 Hrs  T(C): 35.9 (16 Oct 2024 15:21), Max: 35.9 (16 Oct 2024 15:21)  T(F): 96.7 (16 Oct 2024 15:21), Max: 96.7 (16 Oct 2024 15:21)  HR: 72 (16 Oct 2024 15:21) (72 - 72)  BP: 167/100 (16 Oct 2024 15:21) (167/100 - 167/100)  RR: 20 (16 Oct 2024 15:21) (20 - 20)  SpO2: 97% (16 Oct 2024 15:21) (97% - 97%)    Parameters below as of 16 Oct 2024 15:21  Patient On (Oxygen Delivery Method): nasal cannula  O2 Flow (L/min): 4    PHYSICAL EXAM:   General: Alert and oriented, not in acute distress  Respiratory: Breathing unlabored  GI: abd obese, epigastric tenderness upon palpation  : No Garcia, no dysuria or hematuria  Extremities: No pedal edema    LABS:                      10.5   4.03  )-----------( 115      ( 16 Oct 2024 16:26 )             31.3              10-16    132[L]  |  96  |  80[H]  ----------------------------<  112[H]  4.2   |  29  |  6.93[H]    Ca    11.3[H]      16 Oct 2024 16:26  Phos  4.6     10-16  Mg     1.9     10-16    TPro  8.2  /  Alb  3.3[L]  /  TBili  0.5  /  DBili  x   /  AST  42[H]  /  ALT  59  /  AlkPhos  107  10-16  PT/INR - ( 16 Oct 2024 16:26 )   PT: 11.3 sec;   INR: 0.97 ratio    PTT - ( 16 Oct 2024 16:26 )  PTT:36.3 sec    Urinalysis Basic - ( 16 Oct 2024 16:26 )  Color: x / Appearance: x / SG: x / pH: x  Gluc: 112 mg/dL / Ketone: x  / Bili: x / Urobili: x   Blood: x / Protein: x / Nitrite: x   Leuk Esterase: x / RBC: x / WBC x   Sq Epi: x / Non Sq Epi: x / Bacteria: x    RADIOLOGY & ADDITIONAL STUDIES:  < from: CT Abdomen and Pelvis No Cont (10.16.24 @ 16:39) >  PROCEDURE DATE:  10/16/2024    INTERPRETATION:  CLINICAL INFORMATION: 44 years  Male with abd pain,   hematemesis, ESRD.    COMPARISON: Contrast-enhanced CT abdomen and pelvis 1/25/2024  CONTRAST/COMPLICATIONS:  IV Contrast: NONE  Oral Contrast: NONE  Complications: None reported at time of study completion    PROCEDURE:  CT of the Abdomen and Pelvis was performed.  Sagittal and coronal reformats were performed.    LIMITATIONS: Evaluation of the solid organs, vascular structures and GI   tract is limited without oral and IV contrast. Motion artifact.    FINDINGS:  LOWER CHEST: Mild cardiomegaly. Trace pericardial effusion.  LIVER: Hepatomegaly measuring 23 cm sagittal.  BILE DUCTS: Normal caliber.  GALLBLADDER: Contracted. Cholelithiasis.  SPLEEN: Stable splenomegaly measuring 15.3 cm sagittal.  PANCREAS: Within normal limits.  ADRENALS: Within normal limits.  KIDNEYS/URETERS: Completely atrophic kidneys without hydronephrosis.  BLADDER: Decompressed.  REPRODUCTIVE ORGANS: Prostate within normal limits.  BOWEL: No bowel obstruction. Appendix is normal.  PERITONEUM/RETROPERITONEUM: Trace ascites.  VESSELS: Atherosclerotic changes.  LYMPH NODES: Stable 9 mm right external iliac node (3:156).  ABDOMINAL WALL: Small fat-containing umbilical hernia. Mild anasarca.  BONES: Diffuse osseous sclerosis consistent with renal osteodystrophy.    IMPRESSION:  Evaluation of the solid organs, vascular structures and GI tract is   limited without oral and IV contrast.  No acute intra-abdominal pathology.  Hepatosplenomegaly.  Gallstones in contracted gallbladder.  Small nonspecific ascites unchanged.  End-stage renal disease.  Chronic findings as above.  --- End of Report ---  ***Please see the addendum at the top of this report. It may contain   additional important information or changes.****  < end of copied text >  
Chief Complaint:  Patient is a 44y old  Male who presents with a chief complaint of hematemesis (17 Oct 2024 09:54)      HPI:  44 year old man with ESRD on HD who presenting with nausea, vomiting, and coffee ground emesis.  He has Hgb 10 at his baseline with MCV > 100. On labs patient is found to be uremic.    Allergies:  penicillins (Swelling)  fish (Unknown)  penicillin (Anaphylaxis)  shellfish (Anaphylaxis)  shellfish (Rash)  Seafood (Hives)  aspirin (Swelling)  [place] (Anaphylaxis)      PMHX/PSHX:  Diabetes    Morbid obesity with BMI of 40.0-44.9, adult    ESRD on dialysis    Schizophrenia    Bipolar disorder    DM (diabetes mellitus)    HTN (hypertension)    ESRD (end stage renal disease)    Obesity    Migraine    COPD (chronic obstructive pulmonary disease)    Asthma    Renal failure    Asthma with COPD    Diabetes mellitus, type 2    Hypertension    Schizophrenia    Bipolar disorder    Anxiety    Schizophrenia    COPD (chronic obstructive pulmonary disease)    HTN (hypertension)    ESRD on dialysis    HLD (hyperlipidemia)    H/O hernia repair    No significant past surgical history    H/O hernia repair    S/P arteriovenous (AV) fistula creation    AVF (arteriovenous fistula)        Family history:  Family history of diabetes mellitus    Family history of COPD (chronic obstructive pulmonary disease)    No pertinent family history in first degree relatives    No pertinent family history in first degree relatives    No pertinent family history in first degree relatives    FH: lymphoma (Sibling)    FH: rheumatoid arthritis (Sibling)        Social History: as above    Home Medications:    Hospital Medications:  cefTRIAXone   IVPB 1000 milliGRAM(s) IV Intermittent every 24 hours  EPINEPHrine     1 mG/mL Injectable 0.3 milliGRAM(s) IntraMuscular once PRN  fluticasone propionate/ salmeterol 250-50 MICROgram(s) Diskus 1 Dose(s) Inhalation two times a day  insulin lispro (ADMELOG) corrective regimen sliding scale   SubCutaneous every 6 hours  octreotide  Infusion 50 MICROgram(s)/Hr IV Continuous <Continuous>  ondansetron Injectable 4 milliGRAM(s) IV Push every 8 hours PRN  pantoprazole  Injectable 40 milliGRAM(s) IV Push two times a day  tiotropium 2.5 MICROgram(s) Inhaler 2 Puff(s) Inhalation daily        ROS:   General:  AS PER HPI  Eyes:  Adequate vision, no reported pain  ENT:  No sore throat, runny nose, dysphagia  CV:  No chest pain, palpitations  Pulm:  No dyspnea, cough, tachypnea, wheezing  GI:  AS PER HPI  :  No pain, bleeding, burning  Muscle:  No pain, weakness  Neuro:  No tingling, numbness, memory problems  Endocrine:  No polyuria, cold/heat intolerance  Skin:  No rash,      Vital Signs:  Vital Signs Last 24 Hrs  T(C): 36.8 (17 Oct 2024 13:33), Max: 37.2 (16 Oct 2024 22:29)  T(F): 98.3 (17 Oct 2024 13:33), Max: 98.9 (16 Oct 2024 22:29)  HR: 70 (17 Oct 2024 13:33) (70 - 73)  BP: 126/92 (17 Oct 2024 13:33) (126/92 - 172/95)  BP(mean): 80 (17 Oct 2024 05:00) (80 - 80)  RR: 19 (17 Oct 2024 13:33) (18 - 20)  SpO2: 100% (17 Oct 2024 13:33) (97% - 100%)    Parameters below as of 17 Oct 2024 13:33  Patient On (Oxygen Delivery Method): nasal cannula  O2 Flow (L/min): 4    Daily Height in cm: 200.66 (16 Oct 2024 15:21)    Daily     LABS:                        10.8   3.61  )-----------( 110      ( 17 Oct 2024 07:55 )             33.1     Mean Cell Volume: 100.0 fl (10-17-24 @ 07:55)    10-17    131[L]  |  96  |  89[H]  ----------------------------<  107[H]  4.4   |  29  |  8.17[H]    Ca    11.8[H]      17 Oct 2024 07:55  Phos  6.0     10-17  Mg     2.2     10-17    TPro  8.3  /  Alb  3.4[L]  /  TBili  0.6  /  DBili  0.3  /  AST  44[H]  /  ALT  61[H]  /  AlkPhos  113  10-17    LIVER FUNCTIONS - ( 17 Oct 2024 07:55 )  Alb: 3.4 g/dL / Pro: 8.3 g/dL / ALK PHOS: 113 U/L / ALT: 61 U/L DA / AST: 44 U/L / GGT: x           PT/INR - ( 16 Oct 2024 16:26 )   PT: 11.3 sec;   INR: 0.97 ratio         PTT - ( 16 Oct 2024 16:26 )  PTT:36.3 sec  Urinalysis Basic - ( 17 Oct 2024 07:55 )    Color: x / Appearance: x / SG: x / pH: x  Gluc: 107 mg/dL / Ketone: x  / Bili: x / Urobili: x   Blood: x / Protein: x / Nitrite: x   Leuk Esterase: x / RBC: x / WBC x   Sq Epi: x / Non Sq Epi: x / Bacteria: x      Amylase Serum--      Lipase serum71       Ammonia--                          10.8   3.61  )-----------( 110      ( 17 Oct 2024 07:55 )             33.1                         10.5   4.03  )-----------( 115      ( 16 Oct 2024 16:26 )             31.3       PHYSICAL EXAM:   GENERAL:  Lying in bed in NAD  HEENT:  Normocephalic/atraumatic, no scleral icterus  NECK: Trachea midline  CHEST:  Resp even, non labored   ABDOMEN:  Soft, non-tender, non-distended  EXTREMITIES: WWP, no edema  SKIN:  No rash or jaundice  NEURO:  Alert and oriented x 3, no asterixis    Imaging:

## 2024-10-17 NOTE — PROGRESS NOTE ADULT - PROBLEM SELECTOR PLAN 2
hx of recent diagnosis of HCV with +screening test in [facility] on 9/7/24  -per [facility] consult at OSH viral load of 5620 in 2/2024  -not yet started on treatment per patient  -Imaging as above  -, INR 0.97, AST 42, ALT 59, , Tbili 0.5 (LFTs improved from 9/2024)   -f/u HCV quant PCR  -f/u HCV fibrosure  -Hepatology consult in AM hx of recent diagnosis of HCV with + screening test in [facility] ED on 9/7/24  -per [facility] GI consult at OSH viral load of 5620 in 2/2024  -not yet started on treatment per patient  -Imaging as above  -, INR 0.97, AST 42, ALT 59, , Tbili 0.5 (LFTs improved from 9/2024)   -f/u HCV quant PCR  -f/u HCV fibrosure  -Hepatology consult in AM

## 2024-10-17 NOTE — PROGRESS NOTE ADULT - SUBJECTIVE AND OBJECTIVE BOX
INTERVAL HPI/OVERNIGHT EVENTS:    Pt seen and examined at bedside. Denies any abdominal pain at this time. Continues to endorse black emesis and dark stools overnight.  No other complaints.    Vital Signs Last 24 Hrs  T(C): 36.7 (17 Oct 2024 05:00), Max: 37.2 (16 Oct 2024 22:29)  T(F): 98.1 (17 Oct 2024 05:00), Max: 98.9 (16 Oct 2024 22:29)  HR: 71 (17 Oct 2024 05:00) (70 - 73)  BP: 128/57 (17 Oct 2024 05:00) (128/57 - 172/95)  BP(mean): 80 (17 Oct 2024 05:00) (80 - 80)  RR: 18 (17 Oct 2024 05:00) (18 - 20)  SpO2: 99% (17 Oct 2024 05:00) (97% - 99%)    Parameters below as of 17 Oct 2024 05:00  Patient On (Oxygen Delivery Method): nasal cannula  O2 Flow (L/min): 5    I&O's Detail    cefTRIAXone   IVPB 1000 milliGRAM(s) IV Intermittent every 24 hours  pantoprazole  Injectable 40 milliGRAM(s) IV Push two times a day      Physical Exam  General: No acute distress  Skin: No jaundice, no icterus  Abdomen: Obese, soft, nondistended, mild epigastric tender, no rebound tenderness, no guarding, no palpable masses  Extremities: non edematous, no calf pain bilaterally    Drains/Tubes:     Labs:                        10.8   3.61  )-----------( 110      ( 17 Oct 2024 07:55 )             33.1     10-17    131[L]  |  96  |  89[H]  ----------------------------<  107[H]  4.4   |  29  |  8.17[H]    Ca    11.8[H]      17 Oct 2024 07:55  Phos  6.0     10-17  Mg     2.2     10-17    TPro  8.3  /  Alb  3.4[L]  /  TBili  0.6  /  DBili  0.3  /  AST  44[H]  /  ALT  61[H]  /  AlkPhos  113  10-17    PT/INR - ( 16 Oct 2024 16:26 )   PT: 11.3 sec;   INR: 0.97 ratio         PTT - ( 16 Oct 2024 16:26 )  PTT:36.3 sec    RADIOLOGY & ADDITIONAL STUDIES:    44yMale

## 2024-10-17 NOTE — CONSULT NOTE ADULT - NS ATTEND AMEND GEN_ALL_CORE FT
Agree with above.    I saw and examined the patient on 10-17-24. I agree with the findings and plan of care as documented in the fellow/resident/medical student/physician assistant/nurse practitioner.    Today we are treating the patient for:   Coffee ground emesis.     ***General note with plan / recommendation:   44 year old man with ESRD on HD who presenting with nausea, vomiting, and coffee ground emesis.  Coffee ground emesis is never GI bleed and usually from gastroparesis caused by something else such as sepsis, or in this patient likely from uremia.    His Hgb is 10 and stable.  MCV > 100 indicating not GI bleed anemia and likely anemia of chronic disease vs anemia of CKD.  Given that patient had EGD last year which had to aborted due to hypoxia, will perform EGD to rule out any abnormalities.     NPO,  Renal consult for uremia.  PPI 40 mg daily  rest of plan after EGD report.       Billing:  I have reviewed records from [facility].    Other:  - Thank you for involving us in the care of this patient. If you have any questions regarding the plan of care please do not hesitate to call us back.

## 2024-10-17 NOTE — CHART NOTE - NSCHARTNOTEFT_GEN_A_CORE
I was contacted by RN, Patient w/ /87, Patient was seen and examined at bedside, asymptomatic. s/p Amlodipine 5mg.   Continue to monitor BP, consider re-starting BP home medications by primary team.

## 2024-10-18 ENCOUNTER — TRANSCRIPTION ENCOUNTER (OUTPATIENT)
Age: 44
End: 2024-10-18

## 2024-10-18 VITALS
OXYGEN SATURATION: 98 % | DIASTOLIC BLOOD PRESSURE: 82 MMHG | RESPIRATION RATE: 18 BRPM | SYSTOLIC BLOOD PRESSURE: 165 MMHG | TEMPERATURE: 98 F | HEART RATE: 70 BPM

## 2024-10-18 LAB
ANION GAP SERPL CALC-SCNC: 8 MMOL/L — SIGNIFICANT CHANGE UP (ref 5–17)
BILIRUB SERPL-MCNC: 0.5 MG/DL — SIGNIFICANT CHANGE UP (ref 0.2–1.2)
BUN SERPL-MCNC: 98 MG/DL — HIGH (ref 7–18)
CALCIUM SERPL-MCNC: 11.5 MG/DL — HIGH (ref 8.4–10.5)
CHLORIDE SERPL-SCNC: 92 MMOL/L — LOW (ref 96–108)
CO2 SERPL-SCNC: 28 MMOL/L — SIGNIFICANT CHANGE UP (ref 22–31)
CREAT SERPL-MCNC: 10.2 MG/DL — HIGH (ref 0.5–1.3)
EGFR: 6 ML/MIN/1.73M2 — LOW
GLUCOSE BLDC GLUCOMTR-MCNC: 103 MG/DL — HIGH (ref 70–99)
GLUCOSE BLDC GLUCOMTR-MCNC: 95 MG/DL — SIGNIFICANT CHANGE UP (ref 70–99)
GLUCOSE SERPL-MCNC: 122 MG/DL — HIGH (ref 70–99)
HCT VFR BLD CALC: 31.5 % — LOW (ref 39–50)
HGB BLD-MCNC: 10.5 G/DL — LOW (ref 13–17)
INR BLD: 1.03 RATIO — SIGNIFICANT CHANGE UP (ref 0.85–1.16)
MAGNESIUM SERPL-MCNC: 2.2 MG/DL — SIGNIFICANT CHANGE UP (ref 1.6–2.6)
MCHC RBC-ENTMCNC: 32.7 PG — SIGNIFICANT CHANGE UP (ref 27–34)
MCHC RBC-ENTMCNC: 33.3 GM/DL — SIGNIFICANT CHANGE UP (ref 32–36)
MCV RBC AUTO: 98.1 FL — SIGNIFICANT CHANGE UP (ref 80–100)
MELD SCORE WITH DIALYSIS: 26 POINTS — SIGNIFICANT CHANGE UP
MELD SCORE WITHOUT DIALYSIS: 26 POINTS — SIGNIFICANT CHANGE UP
NRBC # BLD: 0 /100 WBCS — SIGNIFICANT CHANGE UP (ref 0–0)
PHOSPHATE SERPL-MCNC: 7.1 MG/DL — HIGH (ref 2.5–4.5)
PLATELET # BLD AUTO: 132 K/UL — LOW (ref 150–400)
POTASSIUM SERPL-MCNC: 4.5 MMOL/L — SIGNIFICANT CHANGE UP (ref 3.5–5.3)
POTASSIUM SERPL-SCNC: 4.5 MMOL/L — SIGNIFICANT CHANGE UP (ref 3.5–5.3)
PROTHROM AB SERPL-ACNC: 12.1 SEC — SIGNIFICANT CHANGE UP (ref 9.9–13.4)
RBC # BLD: 3.21 M/UL — LOW (ref 4.2–5.8)
RBC # FLD: 13.7 % — SIGNIFICANT CHANGE UP (ref 10.3–14.5)
SODIUM SERPL-SCNC: 128 MMOL/L — LOW (ref 135–145)
WBC # BLD: 5.67 K/UL — SIGNIFICANT CHANGE UP (ref 3.8–10.5)
WBC # FLD AUTO: 5.67 K/UL — SIGNIFICANT CHANGE UP (ref 3.8–10.5)

## 2024-10-18 PROCEDURE — 99239 HOSP IP/OBS DSCHRG MGMT >30: CPT

## 2024-10-18 RX ORDER — QUETIAPINE FUMARATE 50 MG/1
200 TABLET, FILM COATED ORAL AT BEDTIME
Refills: 0 | Status: DISCONTINUED | OUTPATIENT
Start: 2024-10-18 | End: 2024-10-18

## 2024-10-18 RX ORDER — QUETIAPINE FUMARATE 50 MG/1
2 TABLET, FILM COATED ORAL
Refills: 0 | DISCHARGE

## 2024-10-18 RX ORDER — HYDRALAZINE HYDROCHLORIDE 100 MG/1
50 TABLET ORAL THREE TIMES A DAY
Refills: 0 | Status: DISCONTINUED | OUTPATIENT
Start: 2024-10-18 | End: 2024-10-18

## 2024-10-18 RX ORDER — QUETIAPINE FUMARATE 50 MG/1
1 TABLET, FILM COATED ORAL
Refills: 0 | DISCHARGE

## 2024-10-18 RX ORDER — QUETIAPINE FUMARATE 50 MG/1
1 TABLET, FILM COATED ORAL
Qty: 0 | Refills: 0 | DISCHARGE
Start: 2024-10-18

## 2024-10-18 RX ORDER — AMLODIPINE BESYLATE 5 MG
5 TABLET ORAL DAILY
Refills: 0 | Status: DISCONTINUED | OUTPATIENT
Start: 2024-10-18 | End: 2024-10-18

## 2024-10-18 RX ORDER — CHLORHEXIDINE GLUCONATE ORAL RINSE 1.2 MG/ML
1 SOLUTION DENTAL DAILY
Refills: 0 | Status: DISCONTINUED | OUTPATIENT
Start: 2024-10-18 | End: 2024-10-18

## 2024-10-18 RX ADMIN — TIOTROPIUM BROMIDE INHALATION SPRAY 2 PUFF(S): 3.12 SPRAY, METERED RESPIRATORY (INHALATION) at 14:31

## 2024-10-18 RX ADMIN — PANTOPRAZOLE SODIUM 40 MILLIGRAM(S): 40 TABLET, DELAYED RELEASE ORAL at 05:25

## 2024-10-18 RX ADMIN — CHLORHEXIDINE GLUCONATE ORAL RINSE 1 APPLICATION(S): 1.2 SOLUTION DENTAL at 14:30

## 2024-10-18 RX ADMIN — HYDRALAZINE HYDROCHLORIDE 50 MILLIGRAM(S): 100 TABLET ORAL at 14:30

## 2024-10-18 RX ADMIN — Medication 100 MILLIGRAM(S): at 02:01

## 2024-10-18 RX ADMIN — ERYTHROPOIETIN 10000 UNIT(S): 10000 INJECTION, SOLUTION INTRAVENOUS; SUBCUTANEOUS at 11:30

## 2024-10-18 RX ADMIN — Medication 5 MILLIGRAM(S): at 06:32

## 2024-10-18 RX ADMIN — Medication 1 DOSE(S): at 14:30

## 2024-10-18 RX ADMIN — QUETIAPINE FUMARATE 200 MILLIGRAM(S): 50 TABLET, FILM COATED ORAL at 03:08

## 2024-10-18 RX ADMIN — HYDRALAZINE HYDROCHLORIDE 50 MILLIGRAM(S): 100 TABLET ORAL at 06:33

## 2024-10-18 RX ADMIN — Medication 4 MILLIGRAM(S): at 05:25

## 2024-10-18 NOTE — PROGRESS NOTE ADULT - PROBLEM SELECTOR PLAN 4
hx of ESRD on HD (M/W/F), makes minimal urine  -last HD 10/16 for 2.5 hrs, no signs of fluid overload on exam  -c/w sevelamer TID AC   -renal diet   -nephro consult for uremia per GI recs  -HD this AM per nephro  -transfuse as needed to keep HGB > 7 per nephro recs
hx of ESRD on HD (M/W/F), makes minimal urine  -last HD 10/16 for 2.5 hrs, no signs of fluid overload on exam  -c/w HD as per nephro  -c/w sevelamer TID AC when not NPO  -Renal diet when not NPO  -Nephro consult in AM

## 2024-10-18 NOTE — PROGRESS NOTE ADULT - PROBLEM SELECTOR PLAN 9
hx of schizophrenia with bipolar  -med rec includes psych meds: fluphenazine, haldol, lexapro, risperdal, seroquel, trazadone, and mirtazipine  -confirm med rec  -restart home psych meds when no longer NPO
hx of schizophrenia with bipolar  -med rec confirmed: on quetiapine, fluphenazine, haldol, lexapro, risperidone, and seroquel  -restart home psych meds

## 2024-10-18 NOTE — PROGRESS NOTE ADULT - ASSESSMENT
44M with ESRD with black emesis and black stools    Plan:  Recommend GI consult for endoscopy/colonoscopy  No surgical intervention warranted at this time  Please reconsult as needed  Signed out to covering PA [phone]
44M with PMH of  schizophrenia, DM, HTN, COPD on 5 L home oxygen, ESRD on HD (M/W/F) presenting with black emesis and melena for past two weeks. Admitted to medicine for upper GI bleed, possibly variceal bleed.  
# ESRD on HD MWF. Admitted with upper Gastrologist bleed. Seen on  HD, stable.  # Anemia of  Chronic Kidney Disease Stage and UPPER Gastrointestinal bleeding  retacrit on HD. transfuse as needed to keep HGB >7. gastroenterology conult  # HTN. BP uncontrolled. UF on HD. Cont norvasc  # renal osteodystrophy. phos elevated. start phos binders when on a diet. renal diet  # hepatitis C. MX per hepatology  
44M with PMH of  schizophrenia, DM, HTN, COPD on 5 L home oxygen, ESRD on HD (M/W/F) presenting with black emesis and melena for past two weeks. Admitted to medicine for upper GI bleed, possibly variceal bleed.

## 2024-10-18 NOTE — DISCHARGE NOTE PROVIDER - NSDCFUSCHEDAPPT_GEN_ALL_CORE_FT
Francesco Bernabe  [facility]  HEPATOLOGY [phone]  Scheduled Appointment: 10/22/2024    [facility]  PSYCHIATRY [id number]  Scheduled Appointment: 10/31/2024    Renaldo Galeana  [facility]  CARDIOLOGY [phone]  Scheduled Appointment: 11/04/2024    Luis Alberto Bueno  [facility]  GENSURG [phone]  Scheduled Appointment: 11/14/2024

## 2024-10-18 NOTE — DISCHARGE NOTE PROVIDER - HOSPITAL COURSE
44M with PMH of  HCV (dx 2/2024), schizophrenia, HTN, Diastolic CHF, COPD on 5 L home oxygen, ESRD on HD (M/W/F), chronic cholecystitis s/p cholecystectomy (2023)  presenting with black emesis and melena for past two weeks. Patient reporst multiple episodes of "black vomit" over the past 3 days associated with black tarry stools. Reports at least 1 loose black bowel movement every other day for last 2 weeks and 1 episode of hematemesis and 2 episodes of black tarry stool today. Reports he cannot keep anything down; as soon as he eats something he vomits immediately. Also endorses similar response at times after drinking liquids and reports vomiting has occurred unrelated to food intake as well (e.g. during HD today).  Patient missed HD on Monday but had 2.5-hour session yesterday and 2.5-hour session today and reports he received heparin during dialysis. Otherwise denies any use of aspirin or anticoagulation. Patient complains of epigastric abdominal discomfort and mild dizziness, Denies fever, chills, headache, chest pain, palpitations, shortness of breath, diarrhea, constipation, and dysuria. Of note, patient states he produces minimal urine at baseline. Additionally, patient reports not taking any medications for last 2 week due to inability to tolerate PO.     In ED, V/S T 96.7, HR 72, /100, RR 20. CT A/P w/o contrast: Hepatosplenomegaly. Small nonspecific ascites unchanged. 5 mm calcification in the GB fossa. A small volume of fluid is present in the fossa. The fluid may represent a seroma, biloma or bile leak. RUQ US: Hepatomegaly. Prior CT A/P (1/2024 and 11/2024) showed hepatic nodularity suggestive of cirrhosis. S/p protonix 80mg IV in ED. Admitted to medicine for upper GI bleed, possibly variceal bleed.      We started Ceftriaxone 1g qd for SBP ppx. GI was consulted, and performed an EGD, where no GI bleed was observed. Pt has a hx of recent diagnosis of HCV [facility]. HCV viral load this admission was found to be 292,001. Will recommend outpatient followup.    44M with PMH of  HCV (dx 2/2024), schizophrenia, HTN, Diastolic CHF, COPD on 5 L home oxygen, ESRD on HD (M/W/F), chronic cholecystitis s/p cholecystectomy (2023)  presenting with black emesis and melena for past two weeks. Patient reporst multiple episodes of "black vomit" over the past 3 days associated with black tarry stools. Reports at least 1 loose black bowel movement every other day for last 2 weeks and 1 episode of hematemesis and 2 episodes of black tarry stool today. Reports he cannot keep anything down; as soon as he eats something he vomits immediately. Also endorses similar response at times after drinking liquids and reports vomiting has occurred unrelated to food intake as well (e.g. during HD today).  Patient missed HD on Monday but had 2.5-hour session yesterday and 2.5-hour session today and reports he received heparin during dialysis. Otherwise denies any use of aspirin or anticoagulation. Patient complains of epigastric abdominal discomfort and mild dizziness, Denies fever, chills, headache, chest pain, palpitations, shortness of breath, diarrhea, constipation, and dysuria. Of note, patient states he produces minimal urine at baseline. Additionally, patient reports not taking any medications for last 2 week due to inability to tolerate PO.     In ED, V/S T 96.7, HR 72, /100, RR 20. CT A/P w/o contrast: Hepatosplenomegaly. Small nonspecific ascites unchanged. 5 mm calcification in the GB fossa. A small volume of fluid is present in the fossa. The fluid may represent a seroma, biloma or bile leak. RUQ US: Hepatomegaly. Prior CT A/P (1/2024 and 11/2024) showed hepatic nodularity suggestive of cirrhosis. S/p protonix 80mg IV in ED. Admitted to medicine for upper GI bleed, possibly variceal bleed.      We started Ceftriaxone 1g qd for SBP ppx. GI was consulted, and performed an EGD, where no GI bleed was observed. Pt has a hx of recent diagnosis of HCV with +screening test in [facility] ED on 9/7/24. HCV viral load this admission was found to be 292,001. Will recommend outpatient followup.     Nephrology Dr. La was consulted, and pt underwent HD on 10/18. Pt i   44M with PMH of  HCV (dx 2/2024), schizophrenia, HTN, Diastolic CHF, COPD on 5 L home oxygen, ESRD on HD (M/W/F), chronic cholecystitis s/p cholecystectomy (2023)  presenting with black emesis and melena for past two weeks. Patient reporst multiple episodes of "black vomit" over the past 3 days associated with black tarry stools. Reports at least 1 loose black bowel movement every other day for last 2 weeks and 1 episode of hematemesis and 2 episodes of black tarry stool today. Reports he cannot keep anything down; as soon as he eats something he vomits immediately. Also endorses similar response at times after drinking liquids and reports vomiting has occurred unrelated to food intake as well (e.g. during HD today).  Patient missed HD on Monday but had 2.5-hour session yesterday and 2.5-hour session today and reports he received heparin during dialysis. Otherwise denies any use of aspirin or anticoagulation. Patient complains of epigastric abdominal discomfort and mild dizziness, Denies fever, chills, headache, chest pain, palpitations, shortness of breath, diarrhea, constipation, and dysuria. Of note, patient states he produces minimal urine at baseline. Additionally, patient reports not taking any medications for last 2 week due to inability to tolerate PO.     In ED, V/S T 96.7, HR 72, /100, RR 20. CT A/P w/o contrast: Hepatosplenomegaly. Small nonspecific ascites unchanged. 5 mm calcification in the GB fossa. A small volume of fluid is present in the fossa. The fluid may represent a seroma, biloma or bile leak. RUQ US: Hepatomegaly. Prior CT A/P (1/2024 and 11/2024) showed hepatic nodularity suggestive of cirrhosis. S/p protonix 80mg IV in ED. Admitted to medicine for upper GI bleed, possibly variceal bleed.      We started Ceftriaxone 1g qd for SBP ppx. GI was consulted, and performed an EGD, where no GI bleed was observed. Pt has a hx of recent diagnosis of HCV with +screening test in [facility] ED on 9/7/24. HCV viral load this admission was found to be 292,001. Will recommend outpatient followup.     Nephrology Dr. La was consulted, and pt underwent HD on 10/18. Pt is feeling better and ready for discharge.    Patient was medically optimized, stable and ready for discharge.    44M with PMH of  HCV (dx 2/2024), schizophrenia, HTN, Diastolic CHF, COPD on 5 L home oxygen, ESRD on HD (M/W/F), chronic cholecystitis s/p cholecystectomy (2023)  presenting with black emesis and melena for past two weeks. Patient reporst multiple episodes of "black vomit" over the past 3 days associated with black tarry stools. Reports at least 1 loose black bowel movement every other day for last 2 weeks and 1 episode of hematemesis and 2 episodes of black tarry stool today. Reports he cannot keep anything down; as soon as he eats something he vomits immediately. Also endorses similar response at times after drinking liquids and reports vomiting has occurred unrelated to food intake as well (e.g. during HD today).  Patient missed HD on Monday but had 2.5-hour session yesterday and 2.5-hour session today and reports he received heparin during dialysis. Otherwise denies any use of aspirin or anticoagulation. Patient complains of epigastric abdominal discomfort and mild dizziness, Denies fever, chills, headache, chest pain, palpitations, shortness of breath, diarrhea, constipation, and dysuria. Of note, patient states he produces minimal urine at baseline. Additionally, patient reports not taking any medications for last 2 week due to inability to tolerate PO.     In ED, V/S T 96.7, HR 72, /100, RR 20. CT A/P w/o contrast: Hepatosplenomegaly. Small nonspecific ascites unchanged. 5 mm calcification in the GB fossa. A small volume of fluid is present in the fossa. The fluid may represent a seroma, biloma or bile leak. RUQ US: Hepatomegaly. Prior CT A/P (1/2024 and 11/2024) showed hepatic nodularity suggestive of cirrhosis. S/p protonix 80mg IV in ED, octreotide drip, ceftriaxone for SBP ppx. Admitted to medicine for upper GI bleed, concern for variceal bleed. GI was consulted, and performed an EGD, where no GI bleed was observed. Geetha pyloric mucosal edema s/p biopsy. 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    Pt has a hx of recent diagnosis of HCV with +screening test in [facility] ED on 9/7/24. HCV viral load this admission was found to be 292,001. Will recommend outpatient follow up.    Nephrology Dr. La was consulted, and pt underwent HD on 10/18.    Patient is stable for discharge per attending and is advised to follow up with PCP as outpatient. Please refer to patient's complete medical chart with documents for a full hospital course, for this is only a brief summary.

## 2024-10-18 NOTE — DISCHARGE NOTE NURSING/CASE MANAGEMENT/SOCIAL WORK - NSDCVIVACCINE_GEN_ALL_CORE_FT
influenza, injectable, quadrivalent, preservative free; 07-Apr-2016 12:13; Halle Ellsworth (RN); [employer]; [id number]; IntraMuscular; Deltoid Left.; 0.5 milliLiter(s); VIS (VIS Published: 07-Aug-2015, VIS Presented: 07-Apr-2016);   influenza, injectable, quadrivalent, preservative free; 06-Dec-2018 14:41; Rhonda Kern (RN); [employer]; [id number] (Exp. Date: 30-Jun-2019); IntraMuscular; Deltoid Right.; 0.5 milliLiter(s); VIS (VIS Published: 07-Aug-2015, VIS Presented: 06-Dec-2018);   influenza, injectable, quadrivalent, preservative free; 05-Mar-2019 16:51; Jace Best (RN); [employer]; [id number] (Exp. Date: 30-Jun-2019); IntraMuscular; Deltoid Right.; 0.5 milliLiter(s); VIS (VIS Published: 07-Aug-2015, VIS Presented: 05-Mar-2019);   influenza, injectable, quadrivalent, preservative free; 19-Dec-2020 12:14; Delmer Weinberg (RN); [employer]; [id number] (Exp. Date: 30-Jun-2021); IntraMuscular; Deltoid Right.; 0.5 milliLiter(s); VIS (VIS Published: 15-Aug-2019, VIS Presented: 19-Dec-2020);

## 2024-10-18 NOTE — PROGRESS NOTE ADULT - PROBLEM SELECTOR PLAN 3
hx of diastolic CHF on carvedilol.   -last TTE (1/2024): EF 60%, G1DD, mild PHTN, concentric LVH, moderate TR  -significant systolic murmur radiating to axilla on exam concerning for new MR  -f/u TTE   -restart home carvedilol   -cardiology consult pending TTE results
hx of diastolic CHF on carvedilol.   -last TTE (1/2024): EF 60%, G1DD, mild PHTN, concentric LVH, moderate TR  -significant systolic murmur radiating to axilla on exam concerning for new MR  -f/u TTE   -restart home carvedilol when no longer NPO  -cardiology consult in AM

## 2024-10-18 NOTE — DISCHARGE NOTE PROVIDER - NSDCCPCAREPLAN_GEN_ALL_CORE_FT
PRINCIPAL DISCHARGE DIAGNOSIS  Diagnosis: Hematemesis  Assessment and Plan of Treatment: You presented due to black emesis and melena for the past 2 weeks. CT scan imaging of your abdomen revealed enlargement of your organs (Liver + Spleen).   GI specialists were consulted, and they performed an Esophagoduodenoscopy (EGD). The EGD revealed no GI bleed.         SECONDARY DISCHARGE DIAGNOSES  Diagnosis: ESRD on dialysis  Assessment and Plan of Treatment: You have a history of ESRD on HD M/W/F. You missed your scheduled session on Monday, but report that you had two smaller sessions on 10/15 and 10/16.   Nephrology specialists were consulted,    Diagnosis: COPD (chronic obstructive pulmonary disease)  Assessment and Plan of Treatment:     Diagnosis: Schizoaffective disorder  Assessment and Plan of Treatment:     Diagnosis: HLD (hyperlipidemia)  Assessment and Plan of Treatment:     Diagnosis: HTN (hypertension)  Assessment and Plan of Treatment:     Diagnosis: Chronic diastolic congestive heart failure  Assessment and Plan of Treatment:     Diagnosis: Hepatitis C infection  Assessment and Plan of Treatment:      PRINCIPAL DISCHARGE DIAGNOSIS  Diagnosis: Hematemesis  Assessment and Plan of Treatment: You presented due to black emesis and melena for the past 2 weeks. CT scan imaging of your abdomen revealed enlargement of your organs (Liver + Spleen).   GI specialists were consulted, and they performed an Esophagoduodenoscopy (EGD). The EGD revealed no GI bleed.   Please FOLLOW UP with your primary care physician regarding this recent hospitalization and for further management.         SECONDARY DISCHARGE DIAGNOSES  Diagnosis: ESRD on dialysis  Assessment and Plan of Treatment: You have a history of ESRD on HD M/W/F. You missed your scheduled session on Monday, but report that you had two smaller sessions on 10/15 and 10/16.   Nephrology specialists were consulted, and you underwent HD on 10/18.  Please FOLLOW UP with your kidney and primary care physicians regarding this recent hospitalization and for further management.    Diagnosis: Hepatitis C infection  Assessment and Plan of Treatment: You have a history of Hepatitis C infection. The viral load in your blood was found to be elevated at 292,001.   Please FOLLOW UP with a Hepatologist. We have provided a name and number for you to schedule an appointment.    Diagnosis: HTN (hypertension)  Assessment and Plan of Treatment: You have high blood pressure. Please CONTINUE taking your medications as prescribed. Please measure your blood pressure at least once daily. Maintain a healthy diet which includes incorporating more vegetables and decreasing the amount of salt (low sodium) and sugar in your diet such as rice, bread, and pasta low sugar, low fat, low sodium diet. Exercise frequently if possible.  Please follow up with your primary care provider within one week of your discharge.      Diagnosis: HLD (hyperlipidemia)  Assessment and Plan of Treatment: You have hyperlipidemia. Please CONTINUE to take your cholesterol medications. Maintain a healthy diet that consist of low sugar, low fat, low sodium. Decrease the amount of fried foods that you consume. Exercise frequently if possible. Please follow up with  your primary care provider within 1 week of your discharge.  You are recommended a DASH Diet that emphasizes mostly vegetables, fruits, and fat-free or low-fat dairy products. Please limit intake of sodium, sweets, beverages w/ high sugar/carbohydrate content.      Diagnosis: COPD (chronic obstructive pulmonary disease)  Assessment and Plan of Treatment: You have a history of COPD. Please CONTINUE your home medication regimen as prescribed.    Diagnosis: Schizoaffective disorder  Assessment and Plan of Treatment: You have a history of schizoaffective disorder. Please CONTINUE your home medication regimen as prescribed.    Diagnosis: Chronic diastolic congestive heart failure  Assessment and Plan of Treatment: You have a history of congestive heart failure. Please CONTINUE to take your home medication regimen as prescribed and follow up with your primary care physician for further management.     PRINCIPAL DISCHARGE DIAGNOSIS  Diagnosis: Hematemesis  Assessment and Plan of Treatment: You presented due to black emesis and melena for the past 2 weeks.   A CT scan of your abdomen showed hepatosplenomegaly (enlarged liver and spleen) and small amounts of fluid, which could indicate possible complications related to your liver disease. An ultrasound confirmed hepatomegaly and the presence of fluid in your abdomen.  To address your symptoms, we started you on Protonix for stomach protection and administered antibiotic Ceftriaxone as a precaution against spontaneous bacterial peritonitis (SBP). We consulted a gastroenterologist, who performed an endoscopy (EGD) to check for bleeding in your digestive tract. Thankfully, no active bleeding was found.  Please FOLLOW UP with your primary care physician regarding this recent hospitalization and for further management.         SECONDARY DISCHARGE DIAGNOSES  Diagnosis: ESRD on dialysis  Assessment and Plan of Treatment: You have a history of ESRD on HD M/W/F. You missed your scheduled session on Monday, but report that you had two smaller sessions on 10/15 and 10/16.   Nephrology specialists were consulted, and you underwent HD on 10/18.  Please FOLLOW UP with your kidney and primary care physicians regarding this recent hospitalization and for further management.    Diagnosis: Hepatitis C infection  Assessment and Plan of Treatment: You have a history of Hepatitis C infection. The viral load in your blood was found to be elevated at 292,001.   Please FOLLOW UP with a Hepatologist. We have provided the contact information for Hepatologist Dr. Bernabe, please schedule an appointment and establish care with him for further management.    Diagnosis: HTN (hypertension)  Assessment and Plan of Treatment: You have high blood pressure. Please CONTINUE taking your medications as prescribed. Please measure your blood pressure at least once daily. Maintain a healthy diet which includes incorporating more vegetables and decreasing the amount of salt (low sodium) and sugar in your diet such as rice, bread, and pasta low sugar, low fat, low sodium diet. Exercise frequently if possible.  Please follow up with your primary care provider within one week of your discharge.      Diagnosis: HLD (hyperlipidemia)  Assessment and Plan of Treatment: You have hyperlipidemia. Please CONTINUE to take your cholesterol medications. Maintain a healthy diet that consist of low sugar, low fat, low sodium. Decrease the amount of fried foods that you consume. Exercise frequently if possible. Please follow up with  your primary care provider within 1 week of your discharge.  You are recommended a DASH Diet that emphasizes mostly vegetables, fruits, and fat-free or low-fat dairy products. Please limit intake of sodium, sweets, beverages w/ high sugar/carbohydrate content.      Diagnosis: COPD (chronic obstructive pulmonary disease)  Assessment and Plan of Treatment: You have a history of COPD. Please CONTINUE your home medication regimen as prescribed.    Diagnosis: Schizoaffective disorder  Assessment and Plan of Treatment: You have a history of schizoaffective disorder. Please CONTINUE your home medication regimen as prescribed.    Diagnosis: Chronic diastolic congestive heart failure  Assessment and Plan of Treatment: You have a history of congestive heart failure. Please CONTINUE to take your home medication regimen as prescribed and follow up with your primary care physician for further management.     PRINCIPAL DISCHARGE DIAGNOSIS  Diagnosis: Hematemesis  Assessment and Plan of Treatment: You presented due to black emesis and melena for the past 2 weeks.   A CT scan of your abdomen showed hepatosplenomegaly (enlarged liver and spleen) and small amounts of fluid, which could indicate possible complications related to your liver disease. An ultrasound confirmed hepatomegaly and the presence of fluid in your abdomen.  To address your symptoms, we started you on Protonix for stomach protection and administered antibiotic Ceftriaxone as a precaution against spontaneous bacterial peritonitis (SBP). We consulted a gastroenterologist, who performed an endoscopy (EGD) to check for bleeding in your digestive tract. No active bleeding was found. However, they did biopsy part of your stomach for tissue samples and sent for pathology.  Please FOLLOW UP with your primary care physician regarding this recent hospitalization and for further management. Monitor for further episodes of bloody vomit and bloody stools. Return to the ED if your condition worsens.        SECONDARY DISCHARGE DIAGNOSES  Diagnosis: Hepatitis C infection  Assessment and Plan of Treatment: You have a history of Hepatitis C infection. The viral load in your blood was found to be elevated at 292,001.   Please FOLLOW UP with a Hepatologist. We have provided the contact information for Hepatologist Dr. Bernabe (information provided). Please schedule an appointment and establish care for further management.    Diagnosis: ESRD on dialysis  Assessment and Plan of Treatment: You have a history of ESRD on HD M/W/F. You missed your scheduled session on Monday, but report that you had two smaller sessions on 10/15 and 10/16.   Nephrology specialists evaluated you during your hosptial stay, and you underwent HD on 10/18.  Please FOLLOW UP with your Nephrologist and primary care physician regarding this recent hospitalization and for further management. Please CONTINUE to attend your dialysis sessions as scheduled.    Diagnosis: HTN (hypertension)  Assessment and Plan of Treatment: You have high blood pressure. Please CONTINUE taking your medications as prescribed. Please measure your blood pressure at least once daily. Maintain a healthy diet which includes incorporating more vegetables and decreasing the amount of salt (low sodium) and sugar in your diet such as rice, bread, and pasta low sugar, low fat, low sodium diet. Exercise frequently if possible.  Please follow up with your primary care provider within one week of your discharge.      Diagnosis: HLD (hyperlipidemia)  Assessment and Plan of Treatment: You have hyperlipidemia. Please CONTINUE to take your cholesterol medications. Maintain a healthy diet that consist of low sugar, low fat, low sodium. Decrease the amount of fried foods that you consume. Exercise frequently if possible. Please follow up with  your primary care provider within 1 week of your discharge.  You are recommended a DASH Diet that emphasizes mostly vegetables, fruits, and fat-free or low-fat dairy products. Please limit intake of sodium, sweets, beverages w/ high sugar/carbohydrate content.      Diagnosis: Chronic diastolic congestive heart failure  Assessment and Plan of Treatment: You have a history of congestive heart failure. Please CONTINUE to take your home medication regimen as prescribed and follow up with your primary care physician for further management.    Diagnosis: COPD (chronic obstructive pulmonary disease)  Assessment and Plan of Treatment: You have a history of COPD. Please CONTINUE your home medications regimen as prescribed.    Diagnosis: Schizoaffective disorder  Assessment and Plan of Treatment: You have a history of schizoaffective disorder. Please CONTINUE your home medications regimen as prescribed.

## 2024-10-18 NOTE — PROGRESS NOTE ADULT - SUBJECTIVE AND OBJECTIVE BOX
PGY-1 Progress Note discussed with attending    PAGER #: [[phone]] TILL 5:00 PM  PLEASE CONTACT ON CALL TEAM:  - On Call Team (Please refer to Janine) FROM 5:00 PM - 8:30PM  - Nightfloat Team FROM 8:30 -7:30 AM      INTERVAL HPI/OVERNIGHT EVENTS:   Patient seen and examined at bedside. NAD, resting comfortably. No acute events overnight. Reports improvement from yesterday. Denies fever, chills, fatigue, weakness, headache, N/V/D/C, abdominal pain, hematemesis, melena, chest pain, palpitations, SOB, and cough. Patient requests advancement to regular diet.   ---------------------------    REVIEW OF SYSTEMS:  CONSTITUTIONAL: (+) weight loss (intentional loss of 29 lbs over the last year); No fever, chills, or fatigue   RESPIRATORY: No cough, wheezing, hemoptysis, or shortness of breath  CARDIOVASCULAR: No chest pain, palpitations, dizziness, or leg swelling  GASTROINTESTINAL: No abdominal pain. No nausea, vomiting, or hematemesis; No diarrhea or constipation. No melena or hematochezia.  GENITOURINARY: No dysuria or hematuria, urinary frequency  NEUROLOGICAL: No headaches, loss of strength, or numbness  SKIN: No itching, burning, rashes, or lesions     MEDICATIONS  (STANDING):  amLODIPine   Tablet 5 milliGRAM(s) Oral daily  epoetin cyndie-epbx (RETACRIT) Injectable 70215 Unit(s) IV Push <User Schedule>  fluticasone propionate/ salmeterol 250-50 MICROgram(s) Diskus 1 Dose(s) Inhalation two times a day  hydrALAZINE 50 milliGRAM(s) Oral three times a day  insulin lispro (ADMELOG) corrective regimen sliding scale   SubCutaneous three times a day before meals  insulin lispro (ADMELOG) corrective regimen sliding scale   SubCutaneous at bedtime  pantoprazole    Tablet 40 milliGRAM(s) Oral before breakfast  QUEtiapine 200 milliGRAM(s) Oral at bedtime  tiotropium 2.5 MICROgram(s) Inhaler 2 Puff(s) Inhalation daily    MEDICATIONS  (PRN):  EPINEPHrine     1 mG/mL Injectable 0.3 milliGRAM(s) IntraMuscular once PRN Anaphylaxsis  ondansetron Injectable 4 milliGRAM(s) IV Push every 8 hours PRN Nausea and/or Vomiting      Vital Signs Last 24 Hrs  T(C): 36.2 (18 Oct 2024 05:17), Max: 36.8 (17 Oct 2024 13:33)  T(F): 97.2 (18 Oct 2024 05:17), Max: 98.3 (17 Oct 2024 13:33)  HR: 74 (18 Oct 2024 05:17) (70 - 74)  BP: 184/90 (18 Oct 2024 05:17) (126/92 - 184/90)  BP(mean): --  RR: 18 (18 Oct 2024 05:17) (18 - 22)  SpO2: 95% (18 Oct 2024 05:17) (95% - 100%)    Parameters below as of 18 Oct 2024 05:17  Patient On (Oxygen Delivery Method): nasal cannula  O2 Flow (L/min): 3      -----------------------------    PHYSICAL EXAMINATION:  GENERAL: Morbidly obese, NAD, lethargic, lying in bed comfortably  HEAD:  Atraumatic, Normocephalic  EYES: EOMI, PERRLA, conjunctiva and sclera clear  ENT: Moist mucous membranes  NECK: Supple, No JVD  CHEST/LUNG: Clear to auscultation bilaterally; No rales, rhonchi, wheezing, or rubs; Unlabored respirations  HEART: Regular rate and rhythm; III/VI systolic murmur radiating to axilla, no rubs or gallops  ABDOMEN: BSx4; Soft, nontender, mildly distended vs habitus  EXTREMITIES:  2+ Peripheral Pulses, brisk capillary refill. No clubbing, cyanosis, or edema  NERVOUS SYSTEM:  A&Ox3, no focal deficits   SKIN: Intact; No rashes or lesions                            10.6   4.66  )-----------( 129      ( 17 Oct 2024 16:12 )             32.5     10-17    131[L]  |  96  |  89[H]  ----------------------------<  107[H]  4.4   |  29  |  8.17[H]    Ca    11.8[H]      17 Oct 2024 07:55  Phos  6.0     10-17  Mg     2.2     10-17    TPro  8.3  /  Alb  3.4[L]  /  TBili  0.6  /  DBili  0.3  /  AST  44[H]  /  ALT  61[H]  /  AlkPhos  113  10-17    LIVER FUNCTIONS - ( 17 Oct 2024 07:55 )  Alb: 3.4 g/dL / Pro: 8.3 g/dL / ALK PHOS: 113 U/L / ALT: 61 U/L DA / AST: 44 U/L / GGT: x               PT/INR - ( 16 Oct 2024 16:26 )   PT: 11.3 sec;   INR: 0.97 ratio         PTT - ( 16 Oct 2024 16:26 )  PTT:36.3 sec    I&O's Summary    17 Oct 2024 07:01  -  18 Oct 2024 07:00  --------------------------------------------------------  IN: 50 mL / OUT: 0 mL / NET: 50 mL            CAPILLARY BLOOD GLUCOSE      RADIOLOGY & ADDITIONAL TESTS:

## 2024-10-18 NOTE — PROGRESS NOTE ADULT - PROBLEM SELECTOR PLAN 1
p/w black emesis and melena for 2 weeks with increased frequency over last 3 days  -Hbg 10.5  -CT A/P w/o contrast: Hepatosplenomegaly. Small nonspecific ascites unchanged.  5 mm calcification in the GB fossa. A small volume of fluid is present in the fossa. The fluid may represent a seroma, biloma or bile leak.  -RUQ US: Hepatomegaly  -Prior CT A/P (1/2024 and 11/2024) showed hepatic nodularity suggestive of cirrhosis  -concern for varices in s/o possible cardiohepatic syndrome vs HCV cirrhosis   -s/p protonix 80mg IV in ED  -started protonix 40mg IV BID  -started octreotide gtt 50 mcg/hr  -started ceftriaxone 1g qd for SBP ppx    -Zofran 4mg IV q8 PRN  -PPI 40mg QAM before breakfast  -advance to regular diet as tolerated  -EGD 10/17: negative for GI bleed; joshua pyloric mucosal edema and duodenal bulb with lymphatic appearing mucosa (possible lymphangiectasia) biopsied

## 2024-10-18 NOTE — PROGRESS NOTE ADULT - PROBLEM SELECTOR PLAN 6
hx of COPD on Breztri and Spiriva  -not in exacerbation  -c/w symbicort and spiriva  -c/w albuterol HFA PRN q6   -incentive spirometry
hx of COPD on Breztri and Spiriva  -not in exacerbation  -c/w symbicort and spiriva  -c/w albuterol HFA PRN q6   -incentive spirometry

## 2024-10-18 NOTE — PROGRESS NOTE ADULT - PROBLEM SELECTOR PLAN 7
hx of HTN on amlodipine and hydralazine  -patient reports not taking medications for last 2 weeks due to inability to tolerate PO and vomiting  -restart home meds when not NPO
hx of HTN on amlodipine and hydralazine  -patient reports not taking medications for last 2 weeks due to inability to tolerate PO and vomiting  -c/w amlodipine 5mg daily  -c/w hydralazine 50mg TID

## 2024-10-18 NOTE — PROGRESS NOTE ADULT - PROBLEM SELECTOR PLAN 2
hx of recent diagnosis of HCV with + screening test in [facility] ED on 9/7/24  -per [facility] GI consult at OSH viral load of 5620 in 2/2024  -not yet started on treatment per patient  -Imaging as above  -, INR 0.97, AST 42, ALT 59, , Tbili 0.5 (LFTs improved from 9/2024)   -f/u HCV quant PCR  -f/u HCV fibrosure  -Hepatology consult

## 2024-10-18 NOTE — DISCHARGE NOTE PROVIDER - ATTENDING DISCHARGE PHYSICAL EXAMINATION:
Constitutional/General: Obese, NAD, vitals reviewed  EYE: Symmetrical pupils, conjunctiva clear   ENT: Good dentition, oropharynx clear  NECK: No visual masses, no JVD  CHEST: No respiratory distress, bilateral symmetrical chest rise  ABDOMEN: Nondistended, no visual masses  SKIN: No rash, warm, dry  NEURO: A+Ox3, Cranial nerves grossly intact, moves all extremities, follows commands  PSYCH: Normal mood, normal affect

## 2024-10-18 NOTE — DISCHARGE NOTE PROVIDER - NSDCMRMEDTOKEN_GEN_ALL_CORE_FT
[facility] MEDICAL OFFICE CTR RADIATION ONCOLOGY  [address] [address]  [id number]  [facility] [address]  Dept Phone: [phone]    Radiation Oncology Clinical Treatment Plan Note    Patient Name:  Jed Rollins  YOB: 1938  MRN:  [id number]  Account Number:  [id number]  Referring Physician:  Pipe Benavides MD  Dictating Physician:  Tegan Romeo Albuterol (Eqv-ProAir HFA) 90 mcg/inh inhalation aerosol: 2 puff(s) inhaled every 6 hours as needed for  shortness of breath and/or wheezing  AMLODIPINE 5MG TAB: 1 tab(s) orally once a day  ascorbic acid: 500 milligram(s) orally once a day  ATORVASTATIN 40MG TAB: 1 tab(s) orally once a day  budesonide/glycopyrrolate/formoterol 160 mcg-9 mcg-4.8 mcg/inh inhalation aerosol: 2 puff(s) inhaled 2 times a day  Calcitriol Oral Capsule 0.5 MC tab(s) orally once a day  CARVEDILOL 25MG TAB: 1 tab(s) orally 2 times a day  CINACALCET TABLET 30 M tab(s) orally once a day  clopidogrel 75 mg oral tablet: 1 tab(s) orally once a day  ESCITALOPRAM 10MG TAB: 1 tab(s) orally once a day  famotidine 20 mg oral tablet: 1 tab(s) orally once a day  ferrous fumarate 325 mg (106 mg elemental iron) oral tablet: 1 tab(s) orally once a day (in the morning) with breakfast  fluPHENAZine 5 mg oral tablet: 1 tab(s) orally once a day  folic acid: 1 milligram(s) orally once a day  Haldol 2 mg oral tablet: 3 tab(s) orally once a day (at bedtime)  hydrALAZINE 50 mg oral tablet: 1 tab(s) orally 3 times a day  ipratropium-albuterol 0.5 mg-2.5 mg/3 mL inhalation solution: 3 milliliter(s) by nebulizer every 8 hours  PARoxetine 20 mg oral tablet: 1 tab(s) orally once a day (in the morning)  QUEtiapine 100 mg oral tablet: 1 tab(s) orally 2 times a day  QUEtiapine 200 mg oral tablet: 2 tab(s) orally once a day (at bedtime)  risperiDONE 2 mg oral tablet: 1 tab(s) orally 2 times a day  senna (sennosides) 8.6 mg oral tablet: 2 tab(s) orally once a day (at bedtime)  Seroquel 200 mg oral tablet: 2 tab(s) orally once a day (at bedtime)  SEVELAMER CARB  M each orally 3 times a day  Velphoro 500 mg oral tablet, chewable: 2 tab(s) chewed 3 times a day with meals   Albuterol (Eqv-ProAir HFA) 90 mcg/inh inhalation aerosol: 2 puff(s) inhaled every 6 hours as needed for  shortness of breath and/or wheezing  AMLODIPINE 5MG TAB: 1 tab(s) orally once a day  ascorbic acid: 500 milligram(s) orally once a day  ATORVASTATIN 40MG TAB: 1 tab(s) orally once a day  budesonide/glycopyrrolate/formoterol 160 mcg-9 mcg-4.8 mcg/inh inhalation aerosol: 2 puff(s) inhaled 2 times a day  Calcitriol Oral Capsule 0.5 MC tab(s) orally once a day  CARVEDILOL 25MG TAB: 1 tab(s) orally 2 times a day  CINACALCET TABLET 30 M tab(s) orally once a day  clopidogrel 75 mg oral tablet: 1 tab(s) orally once a day  ESCITALOPRAM 10MG TAB: 1 tab(s) orally once a day  famotidine 20 mg oral tablet: 1 tab(s) orally once a day  ferrous fumarate 325 mg (106 mg elemental iron) oral tablet: 1 tab(s) orally once a day (in the morning) with breakfast  fluPHENAZine 5 mg oral tablet: 1 tab(s) orally once a day  folic acid: 1 milligram(s) orally once a day  Haldol 2 mg oral tablet: 3 tab(s) orally once a day (at bedtime)  hydrALAZINE 50 mg oral tablet: 1 tab(s) orally 3 times a day  ipratropium-albuterol 0.5 mg-2.5 mg/3 mL inhalation solution: 3 milliliter(s) by nebulizer every 8 hours  PARoxetine 20 mg oral tablet: 1 tab(s) orally once a day (in the morning)  QUEtiapine 200 mg oral tablet: 1 tab(s) orally once a day (at bedtime)  risperiDONE 2 mg oral tablet: 1 tab(s) orally 2 times a day  senna (sennosides) 8.6 mg oral tablet: 2 tab(s) orally once a day (at bedtime)  SEVELAMER CARB  M each orally 3 times a day  Velphoro 500 mg oral tablet, chewable: 2 tab(s) chewed 3 times a day with meals

## 2024-10-18 NOTE — DISCHARGE NOTE PROVIDER - CARE PROVIDER_API CALL
Srinivasa [facility]  Internal Medicine  [address] [place] [address]  Phone: [phone]  Fax: [phone]  Follow Up Time: 2 weeks   Sai Bernabe  Internal Medicine  [address] [place] [address]  Phone: [phone]  Fax: [phone]  Follow Up Time: 2 weeks    Eliceo Coburn  [address] [address]  Phone: [phone]  Fax: [phone]  Established Patient  Follow Up Time:

## 2024-10-18 NOTE — DISCHARGE NOTE NURSING/CASE MANAGEMENT/SOCIAL WORK - FINANCIAL ASSISTANCE
[facility] provides services at a reduced cost to those who are determined to be eligible through [facility]’s financial assistance program. Information regarding [facility]’s financial assistance program can be found by going to [email or web address] or by calling [phone].

## 2024-10-18 NOTE — DISCHARGE NOTE NURSING/CASE MANAGEMENT/SOCIAL WORK - PATIENT PORTAL LINK FT
You can access the FollowMyHealth Patient Portal offered by [facility] by registering at the following website: [email or web address]. By joining [facility]’s FollowMyHealth portal, you will also be able to view your health information using other applications (apps) compatible with our system.

## 2024-10-18 NOTE — DISCHARGE NOTE PROVIDER - CARE PROVIDERS DIRECT ADDRESSES
[email or web address]

## 2024-10-18 NOTE — DISCHARGE NOTE PROVIDER - PROVIDER TOKENS
PROVIDER:[TOKEN:[[id number]],FOLLOWUP:[2 weeks]] PROVIDER:[TOKEN:[[id number]],FOLLOWUP:[2 weeks]],PROVIDER:[TOKEN:[[id number]],ESTABLISHEDPATIENT:[T]]

## 2024-10-18 NOTE — PROGRESS NOTE ADULT - SUBJECTIVE AND OBJECTIVE BOX
[facility] : Progress Note :: [phone], (office [phone]),   Dr La / Dr Hui / Dr Barbre / Dr Villaolbos / Dr Hang CASTELLANO / Dr Mehta / Dr Sanchez / Dr Alber dumont  _____________________________________________________________________________________________  Seen on HD     penicillins (Swelling)  fish (Unknown)  penicillin (Anaphylaxis)  shellfish (Anaphylaxis)  shellfish (Rash)  Seafood (Hives)  aspirin (Swelling)  [place] (Anaphylaxis)    Hospital Medications:   MEDICATIONS  (STANDING):  amLODIPine   Tablet 5 milliGRAM(s) Oral daily  epoetin cyndie-epbx (RETACRIT) Injectable 70125 Unit(s) IV Push <User Schedule>  fluticasone propionate/ salmeterol 250-50 MICROgram(s) Diskus 1 Dose(s) Inhalation two times a day  hydrALAZINE 50 milliGRAM(s) Oral three times a day  insulin lispro (ADMELOG) corrective regimen sliding scale   SubCutaneous three times a day before meals  insulin lispro (ADMELOG) corrective regimen sliding scale   SubCutaneous at bedtime  pantoprazole    Tablet 40 milliGRAM(s) Oral before breakfast  QUEtiapine 200 milliGRAM(s) Oral at bedtime  tiotropium 2.5 MICROgram(s) Inhaler 2 Puff(s) Inhalation daily      VITALS:  T(F): 97.6 (10-18-24 @ 09:36), Max: 98.3 (10-17-24 @ 13:33)  HR: 69 (10-18-24 @ 09:36)  BP: 189/102 (10-18-24 @ 09:36)  RR: 17 (10-18-24 @ 09:36)  SpO2: 99% (10-18-24 @ 09:36)  Wt(kg): --    10-17 @ 07:01  -  10-18 @ 07:00  --------------------------------------------------------  IN: 50 mL / OUT: 0 mL / NET: 50 mL        PHYSICAL EXAM:  Constitutional: NAD  HEENT: anicteric sclera, oropharynx clear.  Neck: No JVD  Respiratory: CTAB, no wheezes, rales or rhonchi  Cardiovascular: S1, S2, RRR  Gastrointestinal: BS+, soft, NT/ND  Extremities: No peripheral edema  Neurological: A/O x 3, no focal deficits  Vascular Access: AVF with thrill and bruit     LABS:  10-18    128[L]  |  92[L]  |  98[H]  ----------------------------<  122[H]  4.5   |  28  |  10.20[H]    Ca    11.5[H]      18 Oct 2024 09:51  Phos  7.1     10-18  Mg     2.2     10-18    TPro      /  Alb      /  TBili  0.5  /  DBili      /  AST      /  ALT      /  AlkPhos      10-18    Creatinine Trend: 10.20 <--, 8.17 <--, 6.93 <--                        10.5   5.67  )-----------( 132      ( 18 Oct 2024 09:51 )             31.5     Urine Studies:  Urinalysis Basic - ( 18 Oct 2024 09:51 )    Color:  / Appearance:  / SG:  / pH:   Gluc: 122 mg/dL / Ketone:   / Bili:  / Urobili:    Blood:  / Protein:  / Nitrite:    Leuk Esterase:  / RBC:  / WBC    Sq Epi:  / Non Sq Epi:  / Bacteria:         RADIOLOGY & ADDITIONAL STUDIES:

## 2024-10-18 NOTE — PROGRESS NOTE ADULT - PROBLEM SELECTOR PLAN 5
CT A/P showing hepatosplenomegaly since 11/2023  -out patient MR L-spine at OSH showed "diffusely low marrow signal intensity with areas of relative  hyperintensity at the superior and inferior ventral corners of the vertebral bodies. Findings are nonspecific and may represent diffuse marrow replacing process such as metastatic disease/myeloma or anemia related red marrow  conversion.  -gamma globulin gap = 4.9  -f/u free light chains, imunnoglobulin panel, SPEP/SOPHIA
CT A/P showing hepatosplenomegaly since 11/2023  -out patient MR L-spine at OSH showed "diffusely low marrow signal intensity with areas of relative  hyperintensity at the superior and inferior ventral corners of the vertebral bodies. Findings are nonspecific and may represent diffuse marrow replacing process such as metastatic disease/myeloma or anemia related red marrow  conversion.  -gamma globulin gap = 4.9  -f/u free light chains, imunnoglobulin panel, SPEP/SOPHIA: quant IgG 2108, SOPHIA Kappa 18.69, SOPHIA lambda 17.64

## 2024-10-18 NOTE — PROGRESS NOTE ADULT - PROBLEM SELECTOR PLAN 8
hx of HLD on atorvastatin  - restart home meds
hx of HLD on atorvastatin  -restart home meds when not NPO

## 2024-10-19 LAB
HBV SURFACE AG SER-ACNC: SIGNIFICANT CHANGE UP
PROT SERPL-MCNC: 8.2 G/DL — SIGNIFICANT CHANGE UP (ref 6–8.3)

## 2024-10-21 LAB
% ALBUMIN: 51.2 % — SIGNIFICANT CHANGE UP
% ALPHA 1: 5.1 % — SIGNIFICANT CHANGE UP
% ALPHA 2: 8.8 % — SIGNIFICANT CHANGE UP
% BETA: 9.9 % — SIGNIFICANT CHANGE UP
% GAMMA: 25 % — SIGNIFICANT CHANGE UP
ALBUMIN SERPL ELPH-MCNC: 4.2 G/DL — SIGNIFICANT CHANGE UP (ref 3.6–5.5)
ALBUMIN/GLOB SERPL ELPH: 1 RATIO — SIGNIFICANT CHANGE UP
ALPHA1 GLOB SERPL ELPH-MCNC: 0.4 G/DL — SIGNIFICANT CHANGE UP (ref 0.1–0.4)
ALPHA2 GLOB SERPL ELPH-MCNC: 0.7 G/DL — SIGNIFICANT CHANGE UP (ref 0.5–1)
B-GLOBULIN SERPL ELPH-MCNC: 0.8 G/DL — SIGNIFICANT CHANGE UP (ref 0.5–1)
GAMMA GLOBULIN: 2 G/DL — HIGH (ref 0.6–1.6)
INTERPRETATION SERPL IFE-IMP: SIGNIFICANT CHANGE UP
PROT PATTERN SERPL ELPH-IMP: SIGNIFICANT CHANGE UP
PROT SERPL-MCNC: 8.2 G/DL — SIGNIFICANT CHANGE UP (ref 6–8.3)
SURGICAL PATHOLOGY STUDY: SIGNIFICANT CHANGE UP

## 2024-10-22 ENCOUNTER — APPOINTMENT (OUTPATIENT)
Dept: HEPATOLOGY | Facility: CLINIC | Age: 44
End: 2024-10-22
Payer: MEDICARE

## 2024-10-22 ENCOUNTER — INPATIENT (INPATIENT)
Facility: HOSPITAL | Age: 44
LOS: 3 days | Discharge: HOME | DRG: 379 | End: 2024-10-26
Attending: STUDENT IN AN ORGANIZED HEALTH CARE EDUCATION/TRAINING PROGRAM | Admitting: STUDENT IN AN ORGANIZED HEALTH CARE EDUCATION/TRAINING PROGRAM
Payer: MEDICARE

## 2024-10-22 VITALS
TEMPERATURE: 98 F | SYSTOLIC BLOOD PRESSURE: 185 MMHG | DIASTOLIC BLOOD PRESSURE: 89 MMHG | WEIGHT: 315 LBS | HEIGHT: 78 IN | HEART RATE: 75 BPM | OXYGEN SATURATION: 91 % | RESPIRATION RATE: 18 BRPM

## 2024-10-22 VITALS
SYSTOLIC BLOOD PRESSURE: 155 MMHG | TEMPERATURE: 98.2 F | DIASTOLIC BLOOD PRESSURE: 75 MMHG | HEIGHT: 78 IN | OXYGEN SATURATION: 88 % | BODY MASS INDEX: 36.45 KG/M2 | RESPIRATION RATE: 16 BRPM | HEART RATE: 71 BPM | WEIGHT: 315 LBS

## 2024-10-22 DIAGNOSIS — Z87.19 PERSONAL HISTORY OF OTHER DISEASES OF THE DIGESTIVE SYSTEM: ICD-10-CM

## 2024-10-22 DIAGNOSIS — Z98.890 OTHER SPECIFIED POSTPROCEDURAL STATES: Chronic | ICD-10-CM

## 2024-10-22 DIAGNOSIS — N18.6 END STAGE RENAL DISEASE: ICD-10-CM

## 2024-10-22 DIAGNOSIS — R11.2 NAUSEA WITH VOMITING, UNSPECIFIED: ICD-10-CM

## 2024-10-22 DIAGNOSIS — K92.2 GASTROINTESTINAL HEMORRHAGE, UNSPECIFIED: ICD-10-CM

## 2024-10-22 DIAGNOSIS — Z29.9 ENCOUNTER FOR PROPHYLACTIC MEASURES, UNSPECIFIED: ICD-10-CM

## 2024-10-22 DIAGNOSIS — J44.9 CHRONIC OBSTRUCTIVE PULMONARY DISEASE, UNSPECIFIED: ICD-10-CM

## 2024-10-22 DIAGNOSIS — B18.2 CHRONIC VIRAL HEPATITIS C: ICD-10-CM

## 2024-10-22 DIAGNOSIS — E66.01 MORBID (SEVERE) OBESITY DUE TO EXCESS CALORIES: ICD-10-CM

## 2024-10-22 DIAGNOSIS — R76.8 OTHER SPECIFIED ABNORMAL IMMUNOLOGICAL FINDINGS IN SERUM: ICD-10-CM

## 2024-10-22 DIAGNOSIS — E11.9 TYPE 2 DIABETES MELLITUS WITHOUT COMPLICATIONS: ICD-10-CM

## 2024-10-22 DIAGNOSIS — I10 ESSENTIAL (PRIMARY) HYPERTENSION: ICD-10-CM

## 2024-10-22 LAB
A2 MACROGLOB SERPL-MCNC: 154 MG/DL — SIGNIFICANT CHANGE UP (ref 110–276)
ALBUMIN SERPL ELPH-MCNC: 3.4 G/DL — LOW (ref 3.5–5)
ALBUMIN SERPL ELPH-MCNC: 3.5 G/DL — SIGNIFICANT CHANGE UP (ref 3.5–5)
ALP SERPL-CCNC: 102 U/L — SIGNIFICANT CHANGE UP (ref 40–120)
ALP SERPL-CCNC: 98 U/L — SIGNIFICANT CHANGE UP (ref 40–120)
ALT FLD-CCNC: 40 U/L DA — SIGNIFICANT CHANGE UP (ref 10–60)
ALT FLD-CCNC: 43 U/L DA — SIGNIFICANT CHANGE UP (ref 10–60)
ALT SERPL W P-5'-P-CCNC: 61 IU/L — HIGH (ref 0–55)
ANION GAP SERPL CALC-SCNC: 7 MMOL/L — SIGNIFICANT CHANGE UP (ref 5–17)
ANION GAP SERPL CALC-SCNC: 8 MMOL/L — SIGNIFICANT CHANGE UP (ref 5–17)
APO A-I SERPL-MCNC: 111 MG/DL — SIGNIFICANT CHANGE UP (ref 101–178)
AST SERPL-CCNC: 28 U/L — SIGNIFICANT CHANGE UP (ref 10–40)
AST SERPL-CCNC: 31 U/L — SIGNIFICANT CHANGE UP (ref 10–40)
BASOPHILS # BLD AUTO: 0.02 K/UL — SIGNIFICANT CHANGE UP (ref 0–0.2)
BASOPHILS # BLD AUTO: 0.02 K/UL — SIGNIFICANT CHANGE UP (ref 0–0.2)
BASOPHILS NFR BLD AUTO: 0.3 % — SIGNIFICANT CHANGE UP (ref 0–2)
BASOPHILS NFR BLD AUTO: 0.4 % — SIGNIFICANT CHANGE UP (ref 0–2)
BILIRUB SERPL-MCNC: 0.4 MG/DL — SIGNIFICANT CHANGE UP (ref 0–1.2)
BILIRUB SERPL-MCNC: 0.6 MG/DL — SIGNIFICANT CHANGE UP (ref 0.2–1.2)
BILIRUB SERPL-MCNC: 0.7 MG/DL — SIGNIFICANT CHANGE UP (ref 0.2–1.2)
BLD GP AB SCN SERPL QL: SIGNIFICANT CHANGE UP
BUN SERPL-MCNC: 66 MG/DL — HIGH (ref 7–18)
BUN SERPL-MCNC: 66 MG/DL — HIGH (ref 7–18)
CALCIUM SERPL-MCNC: 11.7 MG/DL — HIGH (ref 8.4–10.5)
CALCIUM SERPL-MCNC: 12.3 MG/DL — HIGH (ref 8.4–10.5)
CHLORIDE SERPL-SCNC: 96 MMOL/L — SIGNIFICANT CHANGE UP (ref 96–108)
CHLORIDE SERPL-SCNC: 97 MMOL/L — SIGNIFICANT CHANGE UP (ref 96–108)
CO2 SERPL-SCNC: 27 MMOL/L — SIGNIFICANT CHANGE UP (ref 22–31)
CO2 SERPL-SCNC: 28 MMOL/L — SIGNIFICANT CHANGE UP (ref 22–31)
COMMENT 2:: SIGNIFICANT CHANGE UP
CREAT SERPL-MCNC: 8.14 MG/DL — HIGH (ref 0.5–1.3)
CREAT SERPL-MCNC: 8.43 MG/DL — HIGH (ref 0.5–1.3)
EGFR: 7 ML/MIN/1.73M2 — LOW
EGFR: 7 ML/MIN/1.73M2 — LOW
EGFR: 8 ML/MIN/1.73M2 — LOW
EGFR: 8 ML/MIN/1.73M2 — LOW
EOSINOPHIL # BLD AUTO: 0.23 K/UL — SIGNIFICANT CHANGE UP (ref 0–0.5)
EOSINOPHIL # BLD AUTO: 0.28 K/UL — SIGNIFICANT CHANGE UP (ref 0–0.5)
EOSINOPHIL NFR BLD AUTO: 4 % — SIGNIFICANT CHANGE UP (ref 0–6)
EOSINOPHIL NFR BLD AUTO: 4.1 % — SIGNIFICANT CHANGE UP (ref 0–6)
FIBROSIS SCORE: 0.3 — HIGH (ref 0–0.21)
FIBROSIS SCORING:: SIGNIFICANT CHANGE UP
FIBROSIS STAGE: SIGNIFICANT CHANGE UP
FIBROSURE METHODOLOGY: SIGNIFICANT CHANGE UP
GGT SERPL-CCNC: 151 IU/L — HIGH (ref 0–65)
GLUCOSE SERPL-MCNC: 100 MG/DL — HIGH (ref 70–99)
GLUCOSE SERPL-MCNC: 140 MG/DL — HIGH (ref 70–99)
HAPTOGLOB SERPL-MCNC: 102 MG/DL — SIGNIFICANT CHANGE UP (ref 23–355)
HCT VFR BLD CALC: 32 % — LOW (ref 39–50)
HCT VFR BLD CALC: 33.2 % — LOW (ref 39–50)
HGB BLD-MCNC: 10.8 G/DL — LOW (ref 13–17)
HGB BLD-MCNC: 11.1 G/DL — LOW (ref 13–17)
IMM GRANULOCYTES NFR BLD AUTO: 0.2 % — SIGNIFICANT CHANGE UP (ref 0–0.9)
IMM GRANULOCYTES NFR BLD AUTO: 0.4 % — SIGNIFICANT CHANGE UP (ref 0–0.9)
INTERPRETATIONS:: SIGNIFICANT CHANGE UP
LIDOCAIN IGE QN: 69 U/L — SIGNIFICANT CHANGE UP (ref 13–75)
LIMITATIONS:: SIGNIFICANT CHANGE UP
LYMPHOCYTES # BLD AUTO: 0.64 K/UL — LOW (ref 1–3.3)
LYMPHOCYTES # BLD AUTO: 0.75 K/UL — LOW (ref 1–3.3)
LYMPHOCYTES # BLD AUTO: 10.8 % — LOW (ref 13–44)
LYMPHOCYTES # BLD AUTO: 11.4 % — LOW (ref 13–44)
MAGNESIUM SERPL-MCNC: 2.2 MG/DL — SIGNIFICANT CHANGE UP (ref 1.6–2.6)
MCHC RBC-ENTMCNC: 32.1 PG — SIGNIFICANT CHANGE UP (ref 27–34)
MCHC RBC-ENTMCNC: 32.4 PG — SIGNIFICANT CHANGE UP (ref 27–34)
MCHC RBC-ENTMCNC: 33.4 GM/DL — SIGNIFICANT CHANGE UP (ref 32–36)
MCHC RBC-ENTMCNC: 33.8 GM/DL — SIGNIFICANT CHANGE UP (ref 32–36)
MCV RBC AUTO: 96 FL — SIGNIFICANT CHANGE UP (ref 80–100)
MCV RBC AUTO: 96.1 FL — SIGNIFICANT CHANGE UP (ref 80–100)
MONOCYTES # BLD AUTO: 0.7 K/UL — SIGNIFICANT CHANGE UP (ref 0–0.9)
MONOCYTES # BLD AUTO: 0.89 K/UL — SIGNIFICANT CHANGE UP (ref 0–0.9)
MONOCYTES NFR BLD AUTO: 12.5 % — SIGNIFICANT CHANGE UP (ref 2–14)
MONOCYTES NFR BLD AUTO: 12.8 % — SIGNIFICANT CHANGE UP (ref 2–14)
NECROINFLAMM ACTIVITY SCORING:: SIGNIFICANT CHANGE UP
NECROINFLAMMAT ACTIVITY GRADE: SIGNIFICANT CHANGE UP
NECROINFLAMMAT ACTIVITY SCORE: 0.36 — HIGH (ref 0–0.17)
NEUTROPHILS # BLD AUTO: 4.02 K/UL — SIGNIFICANT CHANGE UP (ref 1.8–7.4)
NEUTROPHILS # BLD AUTO: 4.98 K/UL — SIGNIFICANT CHANGE UP (ref 1.8–7.4)
NEUTROPHILS NFR BLD AUTO: 71.4 % — SIGNIFICANT CHANGE UP (ref 43–77)
NEUTROPHILS NFR BLD AUTO: 71.7 % — SIGNIFICANT CHANGE UP (ref 43–77)
NRBC # BLD: 0 /100 WBCS — SIGNIFICANT CHANGE UP (ref 0–0)
NRBC # BLD: 0 /100 WBCS — SIGNIFICANT CHANGE UP (ref 0–0)
NRBC BLD-RTO: 0 /100 WBCS — SIGNIFICANT CHANGE UP (ref 0–0)
NRBC BLD-RTO: 0 /100 WBCS — SIGNIFICANT CHANGE UP (ref 0–0)
PHOSPHATE SERPL-MCNC: 4.6 MG/DL — HIGH (ref 2.5–4.5)
PLATELET # BLD AUTO: 166 K/UL — SIGNIFICANT CHANGE UP (ref 150–400)
PLATELET # BLD AUTO: 173 K/UL — SIGNIFICANT CHANGE UP (ref 150–400)
POTASSIUM SERPL-MCNC: 3.5 MMOL/L — SIGNIFICANT CHANGE UP (ref 3.5–5.3)
POTASSIUM SERPL-MCNC: 3.6 MMOL/L — SIGNIFICANT CHANGE UP (ref 3.5–5.3)
POTASSIUM SERPL-SCNC: 3.5 MMOL/L — SIGNIFICANT CHANGE UP (ref 3.5–5.3)
POTASSIUM SERPL-SCNC: 3.6 MMOL/L — SIGNIFICANT CHANGE UP (ref 3.5–5.3)
PROT SERPL-MCNC: 8 G/DL — SIGNIFICANT CHANGE UP (ref 6–8.3)
PROT SERPL-MCNC: 8.1 G/DL — SIGNIFICANT CHANGE UP (ref 6–8.3)
RBC # BLD: 3.33 M/UL — LOW (ref 4.2–5.8)
RBC # BLD: 3.46 M/UL — LOW (ref 4.2–5.8)
RBC # FLD: 13.6 % — SIGNIFICANT CHANGE UP (ref 10.3–14.5)
RBC # FLD: 13.7 % — SIGNIFICANT CHANGE UP (ref 10.3–14.5)
SODIUM SERPL-SCNC: 130 MMOL/L — LOW (ref 135–145)
SODIUM SERPL-SCNC: 133 MMOL/L — LOW (ref 135–145)
WBC # BLD: 5.62 K/UL — SIGNIFICANT CHANGE UP (ref 3.8–10.5)
WBC # BLD: 6.95 K/UL — SIGNIFICANT CHANGE UP (ref 3.8–10.5)
WBC # FLD AUTO: 5.62 K/UL — SIGNIFICANT CHANGE UP (ref 3.8–10.5)
WBC # FLD AUTO: 6.95 K/UL — SIGNIFICANT CHANGE UP (ref 3.8–10.5)

## 2024-10-22 PROCEDURE — 99223 1ST HOSP IP/OBS HIGH 75: CPT | Mod: GC

## 2024-10-22 PROCEDURE — 99285 EMERGENCY DEPT VISIT HI MDM: CPT

## 2024-10-22 PROCEDURE — 74176 CT ABD & PELVIS W/O CONTRAST: CPT | Mod: 26,MC

## 2024-10-22 PROCEDURE — 99214 OFFICE O/P EST MOD 30 MIN: CPT

## 2024-10-22 RX ORDER — ONDANSETRON HCL/PF 4 MG/2 ML
4 VIAL (ML) INJECTION EVERY 8 HOURS
Refills: 0 | Status: DISCONTINUED | OUTPATIENT
Start: 2024-10-22 | End: 2024-10-26

## 2024-10-22 RX ORDER — ACETAMINOPHEN 500 MG/5ML
650 LIQUID (ML) ORAL EVERY 6 HOURS
Refills: 0 | Status: DISCONTINUED | OUTPATIENT
Start: 2024-10-22 | End: 2024-10-26

## 2024-10-22 RX ORDER — ONDANSETRON HCL/PF 4 MG/2 ML
4 VIAL (ML) INJECTION ONCE
Refills: 0 | Status: COMPLETED | OUTPATIENT
Start: 2024-10-22 | End: 2024-10-22

## 2024-10-22 RX ORDER — MELATONIN 5 MG
3 TABLET ORAL AT BEDTIME
Refills: 0 | Status: DISCONTINUED | OUTPATIENT
Start: 2024-10-22 | End: 2024-10-23

## 2024-10-22 RX ORDER — MAGNESIUM, ALUMINUM HYDROXIDE 200-200 MG
30 TABLET,CHEWABLE ORAL ONCE
Refills: 0 | Status: COMPLETED | OUTPATIENT
Start: 2024-10-22 | End: 2024-10-22

## 2024-10-22 RX ORDER — MAGNESIUM, ALUMINUM HYDROXIDE 200-200 MG
30 TABLET,CHEWABLE ORAL EVERY 4 HOURS
Refills: 0 | Status: DISCONTINUED | OUTPATIENT
Start: 2024-10-22 | End: 2024-10-23

## 2024-10-22 RX ORDER — HYDROMORPHONE/SOD CHLOR,ISO/PF 2 MG/10 ML
0.2 SYRINGE (ML) INJECTION ONCE
Refills: 0 | Status: DISCONTINUED | OUTPATIENT
Start: 2024-10-22 | End: 2024-10-22

## 2024-10-22 RX ORDER — ALBUTEROL SULFATE 2.5 MG/3ML
2 VIAL, NEBULIZER (ML) INHALATION EVERY 6 HOURS
Refills: 0 | Status: DISCONTINUED | OUTPATIENT
Start: 2024-10-22 | End: 2024-10-26

## 2024-10-22 RX ADMIN — Medication 4 MILLIGRAM(S): at 18:04

## 2024-10-22 RX ADMIN — Medication 20 MILLIGRAM(S): at 16:57

## 2024-10-22 RX ADMIN — Medication 5 MILLIGRAM(S): at 22:30

## 2024-10-22 RX ADMIN — Medication 4 MILLIGRAM(S): at 16:32

## 2024-10-22 RX ADMIN — Medication 0.2 MILLIGRAM(S): at 23:01

## 2024-10-22 RX ADMIN — Medication 40 MILLIGRAM(S): at 19:37

## 2024-10-22 RX ADMIN — Medication 80 MILLIGRAM(S): at 16:57

## 2024-10-22 RX ADMIN — Medication 4 MILLIGRAM(S): at 19:37

## 2024-10-22 RX ADMIN — Medication 0.2 MILLIGRAM(S): at 22:31

## 2024-10-22 RX ADMIN — Medication 30 MILLILITER(S): at 16:33

## 2024-10-23 ENCOUNTER — TRANSCRIPTION ENCOUNTER (OUTPATIENT)
Age: 44
End: 2024-10-23

## 2024-10-23 PROBLEM — B18.2 CHRONIC HEPATITIS C WITHOUT HEPATIC COMA: Status: ACTIVE | Noted: 2024-10-22

## 2024-10-23 PROBLEM — R11.2 INTRACTABLE VOMITING WITH NAUSEA: Status: ACTIVE | Noted: 2024-10-23

## 2024-10-23 PROBLEM — Z87.19 HISTORY OF HEMATEMESIS: Status: RESOLVED | Noted: 2024-10-23 | Resolved: 2024-10-23

## 2024-10-23 LAB
ALBUMIN SERPL ELPH-MCNC: 3.3 G/DL — LOW (ref 3.5–5)
ALP SERPL-CCNC: 101 U/L — SIGNIFICANT CHANGE UP (ref 40–120)
ALT FLD-CCNC: 37 U/L DA — SIGNIFICANT CHANGE UP (ref 10–60)
ANION GAP SERPL CALC-SCNC: 7 MMOL/L — SIGNIFICANT CHANGE UP (ref 5–17)
AST SERPL-CCNC: 26 U/L — SIGNIFICANT CHANGE UP (ref 10–40)
BASOPHILS # BLD AUTO: 0.02 K/UL — SIGNIFICANT CHANGE UP (ref 0–0.2)
BASOPHILS NFR BLD AUTO: 0.3 % — SIGNIFICANT CHANGE UP (ref 0–2)
BILIRUB SERPL-MCNC: 0.6 MG/DL — SIGNIFICANT CHANGE UP (ref 0.2–1.2)
BUN SERPL-MCNC: 66 MG/DL — HIGH (ref 7–18)
CALCIUM SERPL-MCNC: 12.2 MG/DL — HIGH (ref 8.4–10.5)
CHLORIDE SERPL-SCNC: 96 MMOL/L — SIGNIFICANT CHANGE UP (ref 96–108)
CO2 SERPL-SCNC: 28 MMOL/L — SIGNIFICANT CHANGE UP (ref 22–31)
CREAT SERPL-MCNC: 9.33 MG/DL — HIGH (ref 0.5–1.3)
EGFR: 7 ML/MIN/1.73M2 — LOW
EGFR: 7 ML/MIN/1.73M2 — LOW
EOSINOPHIL # BLD AUTO: 0.26 K/UL — SIGNIFICANT CHANGE UP (ref 0–0.5)
EOSINOPHIL NFR BLD AUTO: 4.2 % — SIGNIFICANT CHANGE UP (ref 0–6)
GLUCOSE BLDC GLUCOMTR-MCNC: 73 MG/DL — SIGNIFICANT CHANGE UP (ref 70–99)
GLUCOSE BLDC GLUCOMTR-MCNC: 81 MG/DL — SIGNIFICANT CHANGE UP (ref 70–99)
GLUCOSE BLDC GLUCOMTR-MCNC: 93 MG/DL — SIGNIFICANT CHANGE UP (ref 70–99)
GLUCOSE SERPL-MCNC: 103 MG/DL — HIGH (ref 70–99)
HCT VFR BLD CALC: 33.5 % — LOW (ref 39–50)
HGB BLD-MCNC: 11.1 G/DL — LOW (ref 13–17)
IMM GRANULOCYTES NFR BLD AUTO: 0.3 % — SIGNIFICANT CHANGE UP (ref 0–0.9)
LYMPHOCYTES # BLD AUTO: 0.67 K/UL — LOW (ref 1–3.3)
LYMPHOCYTES # BLD AUTO: 10.7 % — LOW (ref 13–44)
MAGNESIUM SERPL-MCNC: 2.5 MG/DL — SIGNIFICANT CHANGE UP (ref 1.6–2.6)
MCHC RBC-ENTMCNC: 32.6 PG — SIGNIFICANT CHANGE UP (ref 27–34)
MCHC RBC-ENTMCNC: 33.1 GM/DL — SIGNIFICANT CHANGE UP (ref 32–36)
MCV RBC AUTO: 98.2 FL — SIGNIFICANT CHANGE UP (ref 80–100)
MONOCYTES # BLD AUTO: 0.7 K/UL — SIGNIFICANT CHANGE UP (ref 0–0.9)
MONOCYTES NFR BLD AUTO: 11.2 % — SIGNIFICANT CHANGE UP (ref 2–14)
NEUTROPHILS # BLD AUTO: 4.59 K/UL — SIGNIFICANT CHANGE UP (ref 1.8–7.4)
NEUTROPHILS NFR BLD AUTO: 73.3 % — SIGNIFICANT CHANGE UP (ref 43–77)
NRBC # BLD: 0 /100 WBCS — SIGNIFICANT CHANGE UP (ref 0–0)
NRBC BLD-RTO: 0 /100 WBCS — SIGNIFICANT CHANGE UP (ref 0–0)
PHOSPHATE SERPL-MCNC: 6.1 MG/DL — HIGH (ref 2.5–4.5)
PLATELET # BLD AUTO: 161 K/UL — SIGNIFICANT CHANGE UP (ref 150–400)
POTASSIUM SERPL-MCNC: 4.1 MMOL/L — SIGNIFICANT CHANGE UP (ref 3.5–5.3)
POTASSIUM SERPL-SCNC: 4.1 MMOL/L — SIGNIFICANT CHANGE UP (ref 3.5–5.3)
PROT SERPL-MCNC: 8.1 G/DL — SIGNIFICANT CHANGE UP (ref 6–8.3)
RBC # BLD: 3.41 M/UL — LOW (ref 4.2–5.8)
RBC # FLD: 13.9 % — SIGNIFICANT CHANGE UP (ref 10.3–14.5)
SODIUM SERPL-SCNC: 131 MMOL/L — LOW (ref 135–145)
WBC # BLD: 6.26 K/UL — SIGNIFICANT CHANGE UP (ref 3.8–10.5)
WBC # FLD AUTO: 6.26 K/UL — SIGNIFICANT CHANGE UP (ref 3.8–10.5)

## 2024-10-23 PROCEDURE — 99233 SBSQ HOSP IP/OBS HIGH 50: CPT | Mod: GC

## 2024-10-23 PROCEDURE — 99223 1ST HOSP IP/OBS HIGH 75: CPT | Mod: FS

## 2024-10-23 RX ORDER — INFLUENZA A VIRUS A/IDAHO/07/2018 (H1N1) ANTIGEN (MDCK CELL DERIVED, PROPIOLACTONE INACTIVATED, INFLUENZA A VIRUS A/INDIANA/08/2018 (H3N2) ANTIGEN (MDCK CELL DERIVED, PROPIOLACTONE INACTIVATED), INFLUENZA B VIRUS B/SINGAPORE/INFTT-16-0610/2016 ANTIGEN (MDCK CELL DERIVED, PROPIOLACTONE INACTIVATED), INFLUENZA B VIRUS B/IOWA/06/2017 ANTIGEN (MDCK CELL DERIVED, PROPIOLACTONE INACTIVATED) 15; 15; 15; 15 UG/.5ML; UG/.5ML; UG/.5ML; UG/.5ML
0.5 INJECTION, SUSPENSION INTRAMUSCULAR ONCE
Refills: 0 | Status: DISCONTINUED | OUTPATIENT
Start: 2024-10-23 | End: 2024-10-26

## 2024-10-23 RX ORDER — AMLODIPINE BESYLATE 10 MG/1
5 TABLET ORAL DAILY
Refills: 0 | Status: DISCONTINUED | OUTPATIENT
Start: 2024-10-23 | End: 2024-10-23

## 2024-10-23 RX ORDER — SENNA 187 MG
2 TABLET ORAL AT BEDTIME
Refills: 0 | Status: DISCONTINUED | OUTPATIENT
Start: 2024-10-23 | End: 2024-10-26

## 2024-10-23 RX ORDER — FOLIC ACID 1 MG/1
1 TABLET ORAL
Refills: 0 | DISCHARGE

## 2024-10-23 RX ORDER — PROMETHAZINE HYDROCHLORIDE 25 MG/ML
25 INJECTION INTRAMUSCULAR; INTRAVENOUS ONCE
Refills: 0 | Status: COMPLETED | OUTPATIENT
Start: 2024-10-23 | End: 2024-10-23

## 2024-10-23 RX ORDER — SENNOSIDES 8.6 MG
2 TABLET ORAL
Refills: 0 | DISCHARGE

## 2024-10-23 RX ORDER — FOLIC ACID 1 MG/1
1 TABLET ORAL DAILY
Refills: 0 | Status: DISCONTINUED | OUTPATIENT
Start: 2024-10-23 | End: 2024-10-26

## 2024-10-23 RX ORDER — ALBUTEROL 90 MCG
2 AEROSOL (GRAM) INHALATION
Refills: 0 | DISCHARGE

## 2024-10-23 RX ORDER — QUETIAPINE FUMARATE 25 MG/1
200 TABLET ORAL AT BEDTIME
Refills: 0 | Status: DISCONTINUED | OUTPATIENT
Start: 2024-10-23 | End: 2024-10-26

## 2024-10-23 RX ORDER — SEVELAMER HYDROCHLORIDE 800 MG/1
800 TABLET ORAL THREE TIMES A DAY
Refills: 0 | Status: DISCONTINUED | OUTPATIENT
Start: 2024-10-23 | End: 2024-10-25

## 2024-10-23 RX ORDER — FLUPHENAZINE HCL 10 MG
5 TABLET ORAL DAILY
Refills: 0 | Status: DISCONTINUED | OUTPATIENT
Start: 2024-10-23 | End: 2024-10-26

## 2024-10-23 RX ORDER — CALCITRIOL 0.5 UG/1
0.5 CAPSULE, GELATIN COATED ORAL DAILY
Refills: 0 | Status: DISCONTINUED | OUTPATIENT
Start: 2024-10-23 | End: 2024-10-26

## 2024-10-23 RX ORDER — IPRATROPIUM BROMIDE AND ALBUTEROL SULFATE .5; 3 MG/3ML; MG/3ML
3 SOLUTION RESPIRATORY (INHALATION)
Refills: 0 | DISCHARGE

## 2024-10-23 RX ORDER — RISPERIDONE 4 MG
2 TABLET ORAL
Refills: 0 | Status: DISCONTINUED | OUTPATIENT
Start: 2024-10-23 | End: 2024-10-26

## 2024-10-23 RX ORDER — ATORVASTATIN CALCIUM 80 MG/1
40 TABLET, FILM COATED ORAL AT BEDTIME
Refills: 0 | Status: DISCONTINUED | OUTPATIENT
Start: 2024-10-23 | End: 2024-10-26

## 2024-10-23 RX ORDER — CINACALCET 30 MG/1
30 TABLET, FILM COATED ORAL DAILY
Refills: 0 | Status: DISCONTINUED | OUTPATIENT
Start: 2024-10-23 | End: 2024-10-26

## 2024-10-23 RX ORDER — ESCITALOPRAM OXALATE 20 MG/1
10 TABLET ORAL DAILY
Refills: 0 | Status: DISCONTINUED | OUTPATIENT
Start: 2024-10-23 | End: 2024-10-26

## 2024-10-23 RX ORDER — AMLODIPINE BESYLATE 10 MG/1
5 TABLET ORAL DAILY
Refills: 0 | Status: DISCONTINUED | OUTPATIENT
Start: 2024-10-23 | End: 2024-10-26

## 2024-10-23 RX ORDER — HALOPERIDOL 10 MG/1
1 TABLET ORAL AT BEDTIME
Refills: 0 | Status: DISCONTINUED | OUTPATIENT
Start: 2024-10-23 | End: 2024-10-26

## 2024-10-23 RX ORDER — ACETAMINOPHEN 500 MG/5ML
1000 LIQUID (ML) ORAL ONCE
Refills: 0 | Status: COMPLETED | OUTPATIENT
Start: 2024-10-23 | End: 2024-10-23

## 2024-10-23 RX ORDER — PAROXETINE HYDROCHLORIDE 20 MG/1
10 TABLET, FILM COATED ORAL DAILY
Refills: 0 | Status: DISCONTINUED | OUTPATIENT
Start: 2024-10-23 | End: 2024-10-26

## 2024-10-23 RX ORDER — PAROXETINE HYDROCHLORIDE 10 MG/1
1 TABLET, FILM COATED ORAL
Refills: 0 | DISCHARGE

## 2024-10-23 RX ADMIN — Medication 500 MILLIGRAM(S): at 18:14

## 2024-10-23 RX ADMIN — FOLIC ACID 1 MILLIGRAM(S): 1 TABLET ORAL at 18:14

## 2024-10-23 RX ADMIN — Medication 40 MILLIGRAM(S): at 18:14

## 2024-10-23 RX ADMIN — SEVELAMER HYDROCHLORIDE 800 MILLIGRAM(S): 800 TABLET ORAL at 23:46

## 2024-10-23 RX ADMIN — Medication 2 MILLIGRAM(S): at 18:15

## 2024-10-23 RX ADMIN — Medication 10 MILLIGRAM(S): at 13:57

## 2024-10-23 RX ADMIN — PROMETHAZINE HYDROCHLORIDE 25 MILLIGRAM(S): 25 INJECTION INTRAMUSCULAR; INTRAVENOUS at 01:18

## 2024-10-23 RX ADMIN — Medication 5 MILLIGRAM(S): at 06:30

## 2024-10-23 RX ADMIN — QUETIAPINE FUMARATE 200 MILLIGRAM(S): 25 TABLET ORAL at 22:48

## 2024-10-23 RX ADMIN — ATORVASTATIN CALCIUM 40 MILLIGRAM(S): 80 TABLET, FILM COATED ORAL at 21:58

## 2024-10-23 RX ADMIN — Medication 2 TABLET(S): at 21:59

## 2024-10-23 RX ADMIN — AMLODIPINE BESYLATE 5 MILLIGRAM(S): 10 TABLET ORAL at 18:14

## 2024-10-23 RX ADMIN — Medication 40 MILLIGRAM(S): at 06:06

## 2024-10-23 RX ADMIN — HALOPERIDOL 1 MILLIGRAM(S): 10 TABLET ORAL at 22:48

## 2024-10-23 RX ADMIN — Medication 400 MILLIGRAM(S): at 15:20

## 2024-10-23 RX ADMIN — Medication 5 MILLIGRAM(S): at 22:47

## 2024-10-24 LAB
ANION GAP SERPL CALC-SCNC: 7 MMOL/L — SIGNIFICANT CHANGE UP (ref 5–17)
BUN SERPL-MCNC: 43 MG/DL — HIGH (ref 7–18)
CALCIUM SERPL-MCNC: 11.7 MG/DL — HIGH (ref 8.4–10.5)
CHLORIDE SERPL-SCNC: 97 MMOL/L — SIGNIFICANT CHANGE UP (ref 96–108)
CO2 SERPL-SCNC: 30 MMOL/L — SIGNIFICANT CHANGE UP (ref 22–31)
CREAT SERPL-MCNC: 7.89 MG/DL — HIGH (ref 0.5–1.3)
EGFR: 8 ML/MIN/1.73M2 — LOW
EGFR: 8 ML/MIN/1.73M2 — LOW
GLUCOSE BLDC GLUCOMTR-MCNC: 111 MG/DL — HIGH (ref 70–99)
GLUCOSE BLDC GLUCOMTR-MCNC: 132 MG/DL — HIGH (ref 70–99)
GLUCOSE SERPL-MCNC: 98 MG/DL — SIGNIFICANT CHANGE UP (ref 70–99)
HCT VFR BLD CALC: 35 % — LOW (ref 39–50)
HGB BLD-MCNC: 11.6 G/DL — LOW (ref 13–17)
MAGNESIUM SERPL-MCNC: 2.2 MG/DL — SIGNIFICANT CHANGE UP (ref 1.6–2.6)
MCHC RBC-ENTMCNC: 32.3 PG — SIGNIFICANT CHANGE UP (ref 27–34)
MCHC RBC-ENTMCNC: 33.1 GM/DL — SIGNIFICANT CHANGE UP (ref 32–36)
MCV RBC AUTO: 97.5 FL — SIGNIFICANT CHANGE UP (ref 80–100)
NRBC # BLD: 0 /100 WBCS — SIGNIFICANT CHANGE UP (ref 0–0)
NRBC BLD-RTO: 0 /100 WBCS — SIGNIFICANT CHANGE UP (ref 0–0)
PHOSPHATE SERPL-MCNC: 5.5 MG/DL — HIGH (ref 2.5–4.5)
PLATELET # BLD AUTO: 150 K/UL — SIGNIFICANT CHANGE UP (ref 150–400)
POTASSIUM SERPL-MCNC: 3.7 MMOL/L — SIGNIFICANT CHANGE UP (ref 3.5–5.3)
POTASSIUM SERPL-SCNC: 3.7 MMOL/L — SIGNIFICANT CHANGE UP (ref 3.5–5.3)
RBC # BLD: 3.59 M/UL — LOW (ref 4.2–5.8)
RBC # FLD: 13.8 % — SIGNIFICANT CHANGE UP (ref 10.3–14.5)
SODIUM SERPL-SCNC: 134 MMOL/L — LOW (ref 135–145)
TROPONIN I, HIGH SENSITIVITY RESULT: 117.1 NG/L — HIGH
TROPONIN I, HIGH SENSITIVITY RESULT: 123.6 NG/L — HIGH
WBC # BLD: 4.9 K/UL — SIGNIFICANT CHANGE UP (ref 3.8–10.5)
WBC # FLD AUTO: 4.9 K/UL — SIGNIFICANT CHANGE UP (ref 3.8–10.5)

## 2024-10-24 PROCEDURE — 99233 SBSQ HOSP IP/OBS HIGH 50: CPT | Mod: GC

## 2024-10-24 PROCEDURE — 93010 ELECTROCARDIOGRAM REPORT: CPT

## 2024-10-24 RX ORDER — CARVEDILOL 3.12 MG/1
25 TABLET, FILM COATED ORAL EVERY 12 HOURS
Refills: 0 | Status: DISCONTINUED | OUTPATIENT
Start: 2024-10-24 | End: 2024-10-26

## 2024-10-24 RX ORDER — CLOPIDOGREL BISULFATE 75 MG/1
75 TABLET, FILM COATED ORAL DAILY
Refills: 0 | Status: DISCONTINUED | OUTPATIENT
Start: 2024-10-24 | End: 2024-10-26

## 2024-10-24 RX ORDER — ASPIRIN 325 MG
81 TABLET ORAL DAILY
Refills: 0 | Status: DISCONTINUED | OUTPATIENT
Start: 2024-10-24 | End: 2024-10-24

## 2024-10-24 RX ORDER — HYDROMORPHONE/SOD CHLOR,ISO/PF 2 MG/10 ML
0.2 SYRINGE (ML) INJECTION ONCE
Refills: 0 | Status: DISCONTINUED | OUTPATIENT
Start: 2024-10-24 | End: 2024-10-24

## 2024-10-24 RX ADMIN — FOLIC ACID 1 MILLIGRAM(S): 1 TABLET ORAL at 11:45

## 2024-10-24 RX ADMIN — Medication 4 MILLIGRAM(S): at 22:46

## 2024-10-24 RX ADMIN — QUETIAPINE FUMARATE 200 MILLIGRAM(S): 25 TABLET ORAL at 22:43

## 2024-10-24 RX ADMIN — Medication 500 MILLIGRAM(S): at 11:45

## 2024-10-24 RX ADMIN — CALCITRIOL 0.5 MICROGRAM(S): 0.5 CAPSULE, GELATIN COATED ORAL at 14:00

## 2024-10-24 RX ADMIN — Medication 2 MILLIGRAM(S): at 17:22

## 2024-10-24 RX ADMIN — HALOPERIDOL 1 MILLIGRAM(S): 10 TABLET ORAL at 22:43

## 2024-10-24 RX ADMIN — AMLODIPINE BESYLATE 5 MILLIGRAM(S): 10 TABLET ORAL at 05:51

## 2024-10-24 RX ADMIN — CARVEDILOL 25 MILLIGRAM(S): 3.12 TABLET, FILM COATED ORAL at 17:22

## 2024-10-24 RX ADMIN — PAROXETINE HYDROCHLORIDE 10 MILLIGRAM(S): 20 TABLET, FILM COATED ORAL at 14:56

## 2024-10-24 RX ADMIN — ATORVASTATIN CALCIUM 40 MILLIGRAM(S): 80 TABLET, FILM COATED ORAL at 22:43

## 2024-10-24 RX ADMIN — Medication 0.2 MILLIGRAM(S): at 08:42

## 2024-10-24 RX ADMIN — Medication 40 MILLIGRAM(S): at 05:51

## 2024-10-24 RX ADMIN — Medication 2 TABLET(S): at 22:43

## 2024-10-24 RX ADMIN — Medication 1 APPLICATION(S): at 11:45

## 2024-10-24 RX ADMIN — SEVELAMER HYDROCHLORIDE 800 MILLIGRAM(S): 800 TABLET ORAL at 22:43

## 2024-10-24 RX ADMIN — SEVELAMER HYDROCHLORIDE 800 MILLIGRAM(S): 800 TABLET ORAL at 14:56

## 2024-10-24 RX ADMIN — ESCITALOPRAM OXALATE 10 MILLIGRAM(S): 20 TABLET ORAL at 14:00

## 2024-10-24 RX ADMIN — Medication 0.2 MILLIGRAM(S): at 08:12

## 2024-10-24 RX ADMIN — CINACALCET 30 MILLIGRAM(S): 30 TABLET, FILM COATED ORAL at 13:59

## 2024-10-24 RX ADMIN — Medication 2 MILLIGRAM(S): at 05:50

## 2024-10-24 RX ADMIN — SEVELAMER HYDROCHLORIDE 800 MILLIGRAM(S): 800 TABLET ORAL at 08:12

## 2024-10-24 RX ADMIN — Medication 40 MILLIGRAM(S): at 22:43

## 2024-10-24 RX ADMIN — Medication 5 MILLIGRAM(S): at 14:00

## 2024-10-25 LAB
ALBUMIN SERPL ELPH-MCNC: 3.3 G/DL — LOW (ref 3.5–5)
ALP SERPL-CCNC: 93 U/L — SIGNIFICANT CHANGE UP (ref 40–120)
ALT FLD-CCNC: 31 U/L DA — SIGNIFICANT CHANGE UP (ref 10–60)
ANION GAP SERPL CALC-SCNC: 6 MMOL/L — SIGNIFICANT CHANGE UP (ref 5–17)
AST SERPL-CCNC: 19 U/L — SIGNIFICANT CHANGE UP (ref 10–40)
BASOPHILS # BLD AUTO: 0.02 K/UL — SIGNIFICANT CHANGE UP (ref 0–0.2)
BASOPHILS NFR BLD AUTO: 0.4 % — SIGNIFICANT CHANGE UP (ref 0–2)
BILIRUB SERPL-MCNC: 0.5 MG/DL — SIGNIFICANT CHANGE UP (ref 0.2–1.2)
BILIRUB SERPL-MCNC: 0.5 MG/DL — SIGNIFICANT CHANGE UP (ref 0.2–1.2)
BUN SERPL-MCNC: 55 MG/DL — HIGH (ref 7–18)
CALCIUM SERPL-MCNC: 11.9 MG/DL — HIGH (ref 8.4–10.5)
CHLORIDE SERPL-SCNC: 95 MMOL/L — LOW (ref 96–108)
CO2 SERPL-SCNC: 31 MMOL/L — SIGNIFICANT CHANGE UP (ref 22–31)
CREAT SERPL-MCNC: 9.64 MG/DL — HIGH (ref 0.5–1.3)
CREAT SERPL-MCNC: 9.75 MG/DL — HIGH (ref 0.5–1.3)
EGFR: 6 ML/MIN/1.73M2 — LOW
EOSINOPHIL # BLD AUTO: 0.22 K/UL — SIGNIFICANT CHANGE UP (ref 0–0.5)
EOSINOPHIL NFR BLD AUTO: 3.9 % — SIGNIFICANT CHANGE UP (ref 0–6)
GLUCOSE BLDC GLUCOMTR-MCNC: 135 MG/DL — HIGH (ref 70–99)
GLUCOSE BLDC GLUCOMTR-MCNC: 158 MG/DL — HIGH (ref 70–99)
GLUCOSE BLDC GLUCOMTR-MCNC: 78 MG/DL — SIGNIFICANT CHANGE UP (ref 70–99)
GLUCOSE SERPL-MCNC: 139 MG/DL — HIGH (ref 70–99)
HCT VFR BLD CALC: 32.7 % — LOW (ref 39–50)
HGB BLD-MCNC: 11 G/DL — LOW (ref 13–17)
IMM GRANULOCYTES NFR BLD AUTO: 0.2 % — SIGNIFICANT CHANGE UP (ref 0–0.9)
INR BLD: 0.97 RATIO — SIGNIFICANT CHANGE UP (ref 0.85–1.16)
LYMPHOCYTES # BLD AUTO: 0.79 K/UL — LOW (ref 1–3.3)
LYMPHOCYTES # BLD AUTO: 14.1 % — SIGNIFICANT CHANGE UP (ref 13–44)
MAGNESIUM SERPL-MCNC: 2.3 MG/DL — SIGNIFICANT CHANGE UP (ref 1.6–2.6)
MCHC RBC-ENTMCNC: 32.4 PG — SIGNIFICANT CHANGE UP (ref 27–34)
MCHC RBC-ENTMCNC: 33.6 GM/DL — SIGNIFICANT CHANGE UP (ref 32–36)
MCV RBC AUTO: 96.5 FL — SIGNIFICANT CHANGE UP (ref 80–100)
MELD SCORE WITH DIALYSIS: 25 POINTS — SIGNIFICANT CHANGE UP
MELD SCORE WITHOUT DIALYSIS: 25 POINTS — SIGNIFICANT CHANGE UP
MONOCYTES # BLD AUTO: 0.96 K/UL — HIGH (ref 0–0.9)
MONOCYTES NFR BLD AUTO: 17.1 % — HIGH (ref 2–14)
NEUTROPHILS # BLD AUTO: 3.61 K/UL — SIGNIFICANT CHANGE UP (ref 1.8–7.4)
NEUTROPHILS NFR BLD AUTO: 64.3 % — SIGNIFICANT CHANGE UP (ref 43–77)
NRBC # BLD: 0 /100 WBCS — SIGNIFICANT CHANGE UP (ref 0–0)
NRBC BLD-RTO: 0 /100 WBCS — SIGNIFICANT CHANGE UP (ref 0–0)
PHOSPHATE SERPL-MCNC: 6.7 MG/DL — HIGH (ref 2.5–4.5)
PLATELET # BLD AUTO: 145 K/UL — LOW (ref 150–400)
POTASSIUM SERPL-MCNC: 3.9 MMOL/L — SIGNIFICANT CHANGE UP (ref 3.5–5.3)
POTASSIUM SERPL-SCNC: 3.9 MMOL/L — SIGNIFICANT CHANGE UP (ref 3.5–5.3)
PROT SERPL-MCNC: 7.7 G/DL — SIGNIFICANT CHANGE UP (ref 6–8.3)
PROTHROM AB SERPL-ACNC: 11.3 SEC — SIGNIFICANT CHANGE UP (ref 9.9–13.4)
RBC # BLD: 3.39 M/UL — LOW (ref 4.2–5.8)
RBC # FLD: 13.6 % — SIGNIFICANT CHANGE UP (ref 10.3–14.5)
SODIUM SERPL-SCNC: 130 MMOL/L — LOW (ref 135–145)
SODIUM SERPL-SCNC: 132 MMOL/L — LOW (ref 135–145)
WBC # BLD: 5.61 K/UL — SIGNIFICANT CHANGE UP (ref 3.8–10.5)
WBC # FLD AUTO: 5.61 K/UL — SIGNIFICANT CHANGE UP (ref 3.8–10.5)

## 2024-10-25 PROCEDURE — 99233 SBSQ HOSP IP/OBS HIGH 50: CPT | Mod: GC

## 2024-10-25 PROCEDURE — 76705 ECHO EXAM OF ABDOMEN: CPT | Mod: 26

## 2024-10-25 PROCEDURE — 99232 SBSQ HOSP IP/OBS MODERATE 35: CPT | Mod: FS,GC

## 2024-10-25 RX ORDER — MAGNESIUM, ALUMINUM HYDROXIDE 200-200 MG
30 TABLET,CHEWABLE ORAL ONCE
Refills: 0 | Status: COMPLETED | OUTPATIENT
Start: 2024-10-25 | End: 2024-10-25

## 2024-10-25 RX ORDER — SEVELAMER HYDROCHLORIDE 800 MG/1
1600 TABLET ORAL
Refills: 0 | Status: DISCONTINUED | OUTPATIENT
Start: 2024-10-25 | End: 2024-10-26

## 2024-10-25 RX ORDER — INSULIN LISPRO 100 U/ML
INJECTION, SOLUTION INTRAVENOUS; SUBCUTANEOUS AT BEDTIME
Refills: 0 | Status: DISCONTINUED | OUTPATIENT
Start: 2024-10-25 | End: 2024-10-26

## 2024-10-25 RX ORDER — INSULIN LISPRO 100 U/ML
INJECTION, SOLUTION INTRAVENOUS; SUBCUTANEOUS
Refills: 0 | Status: DISCONTINUED | OUTPATIENT
Start: 2024-10-25 | End: 2024-10-26

## 2024-10-25 RX ADMIN — AMLODIPINE BESYLATE 5 MILLIGRAM(S): 10 TABLET ORAL at 06:29

## 2024-10-25 RX ADMIN — Medication 500 MILLIGRAM(S): at 14:47

## 2024-10-25 RX ADMIN — Medication 40 MILLIGRAM(S): at 22:45

## 2024-10-25 RX ADMIN — Medication 1 APPLICATION(S): at 14:49

## 2024-10-25 RX ADMIN — ATORVASTATIN CALCIUM 40 MILLIGRAM(S): 80 TABLET, FILM COATED ORAL at 22:45

## 2024-10-25 RX ADMIN — PAROXETINE HYDROCHLORIDE 10 MILLIGRAM(S): 20 TABLET, FILM COATED ORAL at 14:48

## 2024-10-25 RX ADMIN — CALCITRIOL 0.5 MICROGRAM(S): 0.5 CAPSULE, GELATIN COATED ORAL at 14:47

## 2024-10-25 RX ADMIN — Medication 2 MILLIGRAM(S): at 17:30

## 2024-10-25 RX ADMIN — CINACALCET 30 MILLIGRAM(S): 30 TABLET, FILM COATED ORAL at 14:49

## 2024-10-25 RX ADMIN — Medication 2 MILLIGRAM(S): at 06:29

## 2024-10-25 RX ADMIN — SEVELAMER HYDROCHLORIDE 1600 MILLIGRAM(S): 800 TABLET ORAL at 14:48

## 2024-10-25 RX ADMIN — Medication 5 MILLIGRAM(S): at 14:50

## 2024-10-25 RX ADMIN — FOLIC ACID 1 MILLIGRAM(S): 1 TABLET ORAL at 14:46

## 2024-10-25 RX ADMIN — ESCITALOPRAM OXALATE 10 MILLIGRAM(S): 20 TABLET ORAL at 14:46

## 2024-10-25 RX ADMIN — CARVEDILOL 25 MILLIGRAM(S): 3.12 TABLET, FILM COATED ORAL at 17:32

## 2024-10-25 RX ADMIN — QUETIAPINE FUMARATE 200 MILLIGRAM(S): 25 TABLET ORAL at 22:45

## 2024-10-25 RX ADMIN — Medication 30 MILLILITER(S): at 06:29

## 2024-10-25 RX ADMIN — Medication 650 MILLIGRAM(S): at 23:43

## 2024-10-25 RX ADMIN — Medication 5 MILLIGRAM(S): at 02:31

## 2024-10-25 RX ADMIN — CARVEDILOL 25 MILLIGRAM(S): 3.12 TABLET, FILM COATED ORAL at 06:30

## 2024-10-25 RX ADMIN — HALOPERIDOL 1 MILLIGRAM(S): 10 TABLET ORAL at 22:45

## 2024-10-25 RX ADMIN — Medication 2 TABLET(S): at 22:44

## 2024-10-25 RX ADMIN — SEVELAMER HYDROCHLORIDE 1600 MILLIGRAM(S): 800 TABLET ORAL at 17:30

## 2024-10-25 RX ADMIN — Medication 30 MILLILITER(S): at 23:42

## 2024-10-25 RX ADMIN — CLOPIDOGREL BISULFATE 75 MILLIGRAM(S): 75 TABLET, FILM COATED ORAL at 14:48

## 2024-10-25 RX ADMIN — SEVELAMER HYDROCHLORIDE 800 MILLIGRAM(S): 800 TABLET ORAL at 06:30

## 2024-10-26 ENCOUNTER — TRANSCRIPTION ENCOUNTER (OUTPATIENT)
Age: 44
End: 2024-10-26

## 2024-10-26 VITALS
DIASTOLIC BLOOD PRESSURE: 90 MMHG | RESPIRATION RATE: 17 BRPM | OXYGEN SATURATION: 100 % | HEART RATE: 71 BPM | SYSTOLIC BLOOD PRESSURE: 147 MMHG | TEMPERATURE: 98 F

## 2024-10-26 LAB
ALBUMIN SERPL ELPH-MCNC: 3 G/DL — LOW (ref 3.5–5)
ALP SERPL-CCNC: 87 U/L — SIGNIFICANT CHANGE UP (ref 40–120)
ALT FLD-CCNC: 27 U/L DA — SIGNIFICANT CHANGE UP (ref 10–60)
ANION GAP SERPL CALC-SCNC: 4 MMOL/L — LOW (ref 5–17)
AST SERPL-CCNC: 19 U/L — SIGNIFICANT CHANGE UP (ref 10–40)
BILIRUB SERPL-MCNC: 0.5 MG/DL — SIGNIFICANT CHANGE UP (ref 0.2–1.2)
BUN SERPL-MCNC: 39 MG/DL — HIGH (ref 7–18)
CALCIUM SERPL-MCNC: 11.2 MG/DL — HIGH (ref 8.4–10.5)
CHLORIDE SERPL-SCNC: 95 MMOL/L — LOW (ref 96–108)
CO2 SERPL-SCNC: 32 MMOL/L — HIGH (ref 22–31)
CREAT SERPL-MCNC: 8 MG/DL — HIGH (ref 0.5–1.3)
EGFR: 8 ML/MIN/1.73M2 — LOW
EGFR: 8 ML/MIN/1.73M2 — LOW
GLUCOSE BLDC GLUCOMTR-MCNC: 121 MG/DL — HIGH (ref 70–99)
GLUCOSE BLDC GLUCOMTR-MCNC: 91 MG/DL — SIGNIFICANT CHANGE UP (ref 70–99)
GLUCOSE SERPL-MCNC: 98 MG/DL — SIGNIFICANT CHANGE UP (ref 70–99)
HCT VFR BLD CALC: 32.3 % — LOW (ref 39–50)
HGB BLD-MCNC: 10.6 G/DL — LOW (ref 13–17)
MAGNESIUM SERPL-MCNC: 2.3 MG/DL — SIGNIFICANT CHANGE UP (ref 1.6–2.6)
MCHC RBC-ENTMCNC: 32.5 PG — SIGNIFICANT CHANGE UP (ref 27–34)
MCHC RBC-ENTMCNC: 32.8 GM/DL — SIGNIFICANT CHANGE UP (ref 32–36)
MCV RBC AUTO: 99.1 FL — SIGNIFICANT CHANGE UP (ref 80–100)
NRBC # BLD: 0 /100 WBCS — SIGNIFICANT CHANGE UP (ref 0–0)
NRBC BLD-RTO: 0 /100 WBCS — SIGNIFICANT CHANGE UP (ref 0–0)
PHOSPHATE SERPL-MCNC: 4.6 MG/DL — HIGH (ref 2.5–4.5)
PLATELET # BLD AUTO: 138 K/UL — LOW (ref 150–400)
POTASSIUM SERPL-MCNC: 4.6 MMOL/L — SIGNIFICANT CHANGE UP (ref 3.5–5.3)
POTASSIUM SERPL-SCNC: 4.6 MMOL/L — SIGNIFICANT CHANGE UP (ref 3.5–5.3)
PROT SERPL-MCNC: 7.5 G/DL — SIGNIFICANT CHANGE UP (ref 6–8.3)
RBC # BLD: 3.26 M/UL — LOW (ref 4.2–5.8)
RBC # FLD: 13.5 % — SIGNIFICANT CHANGE UP (ref 10.3–14.5)
SODIUM SERPL-SCNC: 131 MMOL/L — LOW (ref 135–145)
WBC # BLD: 5.29 K/UL — SIGNIFICANT CHANGE UP (ref 3.8–10.5)
WBC # FLD AUTO: 5.29 K/UL — SIGNIFICANT CHANGE UP (ref 3.8–10.5)

## 2024-10-26 PROCEDURE — 99239 HOSP IP/OBS DSCHRG MGMT >30: CPT

## 2024-10-26 RX ORDER — FAMOTIDINE 40 MG
1 TABLET ORAL
Refills: 0 | DISCHARGE

## 2024-10-26 RX ORDER — SUCROFERRIC OXYHYDROXIDE 500 MG/1
2 TABLET, CHEWABLE ORAL
Refills: 0 | DISCHARGE

## 2024-10-26 RX ORDER — MAG HYDROX/ALUMINUM HYD/SIMETH 200-200-20
10 SUSPENSION, ORAL (FINAL DOSE FORM) ORAL
Qty: 420 | Refills: 0
Start: 2024-10-26 | End: 2024-11-08

## 2024-10-26 RX ADMIN — SEVELAMER HYDROCHLORIDE 1600 MILLIGRAM(S): 800 TABLET ORAL at 09:02

## 2024-10-26 RX ADMIN — Medication 650 MILLIGRAM(S): at 00:43

## 2024-10-26 RX ADMIN — CALCITRIOL 0.5 MICROGRAM(S): 0.5 CAPSULE, GELATIN COATED ORAL at 12:30

## 2024-10-26 RX ADMIN — AMLODIPINE BESYLATE 5 MILLIGRAM(S): 10 TABLET ORAL at 06:38

## 2024-10-26 RX ADMIN — ESCITALOPRAM OXALATE 10 MILLIGRAM(S): 20 TABLET ORAL at 12:29

## 2024-10-26 RX ADMIN — PAROXETINE HYDROCHLORIDE 10 MILLIGRAM(S): 20 TABLET, FILM COATED ORAL at 12:29

## 2024-10-26 RX ADMIN — Medication 5 MILLIGRAM(S): at 12:32

## 2024-10-26 RX ADMIN — FOLIC ACID 1 MILLIGRAM(S): 1 TABLET ORAL at 12:29

## 2024-10-26 RX ADMIN — CINACALCET 30 MILLIGRAM(S): 30 TABLET, FILM COATED ORAL at 12:28

## 2024-10-26 RX ADMIN — SEVELAMER HYDROCHLORIDE 1600 MILLIGRAM(S): 800 TABLET ORAL at 12:29

## 2024-10-26 RX ADMIN — Medication 2 MILLIGRAM(S): at 06:38

## 2024-10-26 RX ADMIN — Medication 1 APPLICATION(S): at 12:30

## 2024-10-26 RX ADMIN — CARVEDILOL 25 MILLIGRAM(S): 3.12 TABLET, FILM COATED ORAL at 06:38

## 2024-10-26 RX ADMIN — Medication 500 MILLIGRAM(S): at 12:29

## 2024-10-26 RX ADMIN — CLOPIDOGREL BISULFATE 75 MILLIGRAM(S): 75 TABLET, FILM COATED ORAL at 12:29

## 2024-10-27 NOTE — PROGRESS NOTE ADULT - PROVIDER SPECIALTY LIST ADULT
Cardiology Pt.  Repositioned in bed  ivf patent infusing  bed alarm activated call bell within reach.

## 2024-10-28 LAB
HCV RNA FLD QL NAA+PROBE: SIGNIFICANT CHANGE UP
HCV RNA SERPL NAA DL=5-ACNC: SIGNIFICANT CHANGE UP IU/ML
HCV RNA SPEC NAA+PROBE-LOG IU: 4.37 LOGIU/ML — SIGNIFICANT CHANGE UP
HCV RNA SPEC NAA+PROBE-LOG IU: DETECTED
HCV RNA SPEC QL PROBE+SIG AMP: DETECTED

## 2024-10-31 ENCOUNTER — APPOINTMENT (OUTPATIENT)
Dept: PSYCHIATRY | Facility: CLINIC | Age: 44
End: 2024-10-31

## 2024-10-31 LAB — HCV GENTYP BLD NAA+PROBE: SIGNIFICANT CHANGE UP

## 2024-11-04 ENCOUNTER — APPOINTMENT (OUTPATIENT)
Dept: CARDIOLOGY | Facility: CLINIC | Age: 44
End: 2024-11-04
Payer: MEDICARE

## 2024-11-04 ENCOUNTER — NON-APPOINTMENT (OUTPATIENT)
Age: 44
End: 2024-11-04

## 2024-11-04 VITALS
WEIGHT: 315 LBS | DIASTOLIC BLOOD PRESSURE: 97 MMHG | TEMPERATURE: 97.5 F | SYSTOLIC BLOOD PRESSURE: 193 MMHG | BODY MASS INDEX: 41.12 KG/M2 | HEART RATE: 79 BPM | OXYGEN SATURATION: 94 %

## 2024-11-04 DIAGNOSIS — I10 ESSENTIAL (PRIMARY) HYPERTENSION: ICD-10-CM

## 2024-11-04 DIAGNOSIS — E78.5 HYPERLIPIDEMIA, UNSPECIFIED: ICD-10-CM

## 2024-11-04 DIAGNOSIS — Z01.818 ENCOUNTER FOR OTHER PREPROCEDURAL EXAMINATION: ICD-10-CM

## 2024-11-04 PROCEDURE — 93000 ELECTROCARDIOGRAM COMPLETE: CPT

## 2024-11-04 PROCEDURE — 99204 OFFICE O/P NEW MOD 45 MIN: CPT

## 2024-11-06 NOTE — ED PROVIDER NOTE - INPATIENT RESIDENT/ACP NOTIFIED
Chief Complaint:   Knee Injury (Started 2 weeks ago with cuts on knee are tender and have a \"lump\", all healed but one. Not hot to the touch, no discharge. Ice no help. )    History of Present Illness   Source of history provided by:  patient.     Chris Hardy is a 22 y.o. old male who presents to walk-in for evaluation of redness to the left lateral knee, which began 14 days ago. Reports the area is warm to touch, moderately painful, and swollen. Denies lymphangitic streaking. Denies any bleeding or active drainage. Since onset, the symptoms have worsened. Denies any fever, chills, HA, recent illness, myalgias, nausea, vomiting, or lethargy.       ROS   Unless otherwise stated in this report or unable to obtain because of the patient's clinical or mental status as evidenced by the medical record, this patients's positive and negative responses for Review of Systems, constitutional, psych, eyes, ENT, cardiovascular, respiratory, gastrointestinal, neurological, genitourinary, musculoskeletal, integument systems and systems related to the presenting problem are either stated in the preceding or were not pertinent or were negative for the symptoms and/or complaints related to the medical problem.    Past Medical History:  has no past medical history on file.   Past Surgical History:  has no past surgical history on file.  Social History:  reports that he has never smoked. He has never been exposed to tobacco smoke. He has never used smokeless tobacco. He reports that he does not drink alcohol and does not use drugs.  Family History: family history is not on file.   Allergies: Patient has no known allergies.    Physical Exam   Vital Signs:  /66 (Site: Right Upper Arm, Position: Sitting, Cuff Size: Large Adult)   Pulse 59   Temp 97.9 °F (36.6 °C) (Temporal)   Resp 16   Ht 1.956 m (6' 5\")   Wt 99.8 kg (220 lb)   SpO2 98%   BMI 26.09 kg/m²    Oxygen Saturation Interpretation: Normal.    Constitutional:  
MAR

## 2024-11-08 NOTE — ED ADULT TRIAGE NOTE - PAIN RATING/NUMBER SCALE (0-10): REST
as above I have seen and examined patient with NP and agree with above assessment and plan. I have seen and examined patient with NP, agree with above assessment and plan I have seen and examined patient with NP, agree with above assessment and plan. I have seen and examined patient with NP, agree with above assessment and plan. as above as above 0

## 2024-11-14 ENCOUNTER — APPOINTMENT (OUTPATIENT)
Dept: SURGERY | Facility: CLINIC | Age: 44
End: 2024-11-14
Payer: MEDICARE

## 2024-11-14 VITALS
BODY MASS INDEX: 36.45 KG/M2 | DIASTOLIC BLOOD PRESSURE: 98 MMHG | OXYGEN SATURATION: 92 % | TEMPERATURE: 97.1 F | HEIGHT: 78 IN | WEIGHT: 315 LBS | SYSTOLIC BLOOD PRESSURE: 154 MMHG | HEART RATE: 91 BPM

## 2024-11-14 DIAGNOSIS — E66.01 MORBID (SEVERE) OBESITY DUE TO EXCESS CALORIES: ICD-10-CM

## 2024-11-14 PROCEDURE — 99213 OFFICE O/P EST LOW 20 MIN: CPT

## 2024-11-15 ENCOUNTER — NON-APPOINTMENT (OUTPATIENT)
Age: 44
End: 2024-11-15

## 2024-11-15 LAB
25(OH)D3 SERPL-MCNC: 57.1 NG/ML
ALBUMIN SERPL ELPH-MCNC: 4.3 G/DL
ALP BLD-CCNC: 133 U/L
ALT SERPL-CCNC: 71 U/L
ANION GAP SERPL CALC-SCNC: 15 MMOL/L
APTT BLD: 34.9 SEC
AST SERPL-CCNC: 73 U/L
BASOPHILS # BLD AUTO: 0.03 K/UL
BASOPHILS NFR BLD AUTO: 0.6 %
BILIRUB SERPL-MCNC: 0.5 MG/DL
BUN SERPL-MCNC: 58 MG/DL
CALCIUM SERPL-MCNC: 10.9 MG/DL
CALCIUM SERPL-MCNC: 10.9 MG/DL
CHLORIDE SERPL-SCNC: 96 MMOL/L
CHOLEST SERPL-MCNC: 188 MG/DL
CO2 SERPL-SCNC: 26 MMOL/L
CREAT SERPL-MCNC: 5.82 MG/DL
EGFR: 12 ML/MIN/1.73M2
EOSINOPHIL # BLD AUTO: 0.29 K/UL
EOSINOPHIL NFR BLD AUTO: 5.6 %
ESTIMATED AVERAGE GLUCOSE: 88 MG/DL
FERRITIN SERPL-MCNC: 1646 NG/ML
FOLATE SERPL-MCNC: 16.8 NG/ML
GLUCOSE SERPL-MCNC: 96 MG/DL
HBA1C MFR BLD HPLC: 4.7 %
HCT VFR BLD CALC: 31.7 %
HDLC SERPL-MCNC: 52 MG/DL
HGB BLD-MCNC: 10.1 G/DL
IMM GRANULOCYTES NFR BLD AUTO: 0.4 %
INR PPP: 0.87 RATIO
IRON SATN MFR SERPL: 44 %
IRON SERPL-MCNC: 105 UG/DL
LDLC SERPL CALC-MCNC: 122 MG/DL
LYMPHOCYTES # BLD AUTO: 0.63 K/UL
LYMPHOCYTES NFR BLD AUTO: 12.1 %
MAN DIFF?: NORMAL
MCHC RBC-ENTMCNC: 31.9 G/DL
MCHC RBC-ENTMCNC: 32.1 PG
MCV RBC AUTO: 100.6 FL
MONOCYTES # BLD AUTO: 0.64 K/UL
MONOCYTES NFR BLD AUTO: 12.3 %
NEUTROPHILS # BLD AUTO: 3.6 K/UL
NEUTROPHILS NFR BLD AUTO: 69 %
NONHDLC SERPL-MCNC: 137 MG/DL
PARATHYROID HORMONE INTACT: 228 PG/ML
PLATELET # BLD AUTO: 159 K/UL
POTASSIUM SERPL-SCNC: 4.2 MMOL/L
PROT SERPL-MCNC: 7.9 G/DL
PT BLD: 10.3 SEC
RBC # BLD: 3.15 M/UL
RBC # FLD: 14 %
SODIUM SERPL-SCNC: 137 MMOL/L
TIBC SERPL-MCNC: 241 UG/DL
TRIGL SERPL-MCNC: 80 MG/DL
TSH SERPL-ACNC: 2.42 UIU/ML
UIBC SERPL-MCNC: 136 UG/DL
VIT B12 SERPL-MCNC: 1362 PG/ML
WBC # FLD AUTO: 5.21 K/UL

## 2024-11-18 ENCOUNTER — APPOINTMENT (OUTPATIENT)
Dept: PSYCHIATRY | Facility: CLINIC | Age: 44
End: 2024-11-18

## 2024-11-19 LAB — VIT B1 SERPL-MCNC: 134.8 NMOL/L

## 2024-11-25 ENCOUNTER — APPOINTMENT (OUTPATIENT)
Dept: PSYCHIATRY | Facility: CLINIC | Age: 44
End: 2024-11-25
Payer: MEDICARE

## 2024-11-25 PROCEDURE — 90832 PSYTX W PT 30 MINUTES: CPT | Mod: 2W

## 2024-11-26 PROCEDURE — 85027 COMPLETE CBC AUTOMATED: CPT

## 2024-11-26 PROCEDURE — 87521 HEPATITIS C PROBE&RVRS TRNSC: CPT

## 2024-11-26 PROCEDURE — 99285 EMERGENCY DEPT VISIT HI MDM: CPT

## 2024-11-26 PROCEDURE — 85025 COMPLETE CBC W/AUTO DIFF WBC: CPT

## 2024-11-26 PROCEDURE — 80053 COMPREHEN METABOLIC PANEL: CPT

## 2024-11-26 PROCEDURE — 88305 TISSUE EXAM BY PATHOLOGIST: CPT

## 2024-11-26 PROCEDURE — 83735 ASSAY OF MAGNESIUM: CPT

## 2024-11-26 PROCEDURE — 86850 RBC ANTIBODY SCREEN: CPT

## 2024-11-26 PROCEDURE — 83036 HEMOGLOBIN GLYCOSYLATED A1C: CPT

## 2024-11-26 PROCEDURE — 84295 ASSAY OF SERUM SODIUM: CPT

## 2024-11-26 PROCEDURE — 86334 IMMUNOFIX E-PHORESIS SERUM: CPT

## 2024-11-26 PROCEDURE — 85610 PROTHROMBIN TIME: CPT

## 2024-11-26 PROCEDURE — 87902 NFCT AGT GNTYP ALYS HEP C: CPT

## 2024-11-26 PROCEDURE — 99261: CPT

## 2024-11-26 PROCEDURE — 93005 ELECTROCARDIOGRAM TRACING: CPT

## 2024-11-26 PROCEDURE — 74176 CT ABD & PELVIS W/O CONTRAST: CPT | Mod: MC

## 2024-11-26 PROCEDURE — 86900 BLOOD TYPING SEROLOGIC ABO: CPT

## 2024-11-26 PROCEDURE — 84165 PROTEIN E-PHORESIS SERUM: CPT

## 2024-11-26 PROCEDURE — 82247 BILIRUBIN TOTAL: CPT

## 2024-11-26 PROCEDURE — 84100 ASSAY OF PHOSPHORUS: CPT

## 2024-11-26 PROCEDURE — 80048 BASIC METABOLIC PNL TOTAL CA: CPT

## 2024-11-26 PROCEDURE — 76705 ECHO EXAM OF ABDOMEN: CPT

## 2024-11-26 PROCEDURE — 87522 HEPATITIS C REVRS TRNSCRPJ: CPT

## 2024-11-26 PROCEDURE — 87340 HEPATITIS B SURFACE AG IA: CPT

## 2024-11-26 PROCEDURE — 82784 ASSAY IGA/IGD/IGG/IGM EACH: CPT

## 2024-11-26 PROCEDURE — 96374 THER/PROPH/DIAG INJ IV PUSH: CPT

## 2024-11-26 PROCEDURE — 86901 BLOOD TYPING SEROLOGIC RH(D): CPT

## 2024-11-26 PROCEDURE — 84155 ASSAY OF PROTEIN SERUM: CPT

## 2024-11-26 PROCEDURE — 82962 GLUCOSE BLOOD TEST: CPT

## 2024-11-26 PROCEDURE — 96375 TX/PRO/DX INJ NEW DRUG ADDON: CPT

## 2024-11-26 PROCEDURE — 36415 COLL VENOUS BLD VENIPUNCTURE: CPT

## 2024-11-26 PROCEDURE — 83521 IG LIGHT CHAINS FREE EACH: CPT

## 2024-11-26 PROCEDURE — 83690 ASSAY OF LIPASE: CPT

## 2024-11-26 PROCEDURE — 85730 THROMBOPLASTIN TIME PARTIAL: CPT

## 2024-11-26 PROCEDURE — 80076 HEPATIC FUNCTION PANEL: CPT

## 2024-11-26 PROCEDURE — 88312 SPECIAL STAINS GROUP 1: CPT

## 2024-11-26 PROCEDURE — 82565 ASSAY OF CREATININE: CPT

## 2024-11-26 PROCEDURE — 84484 ASSAY OF TROPONIN QUANT: CPT

## 2024-11-26 PROCEDURE — 81596 NFCT DS CHRNC HCV 6 ASSAYS: CPT

## 2024-11-26 PROCEDURE — 71045 X-RAY EXAM CHEST 1 VIEW: CPT

## 2024-11-26 PROCEDURE — 94640 AIRWAY INHALATION TREATMENT: CPT

## 2024-12-03 NOTE — H&P ADULT - HISTORY OF PRESENT ILLNESS
Judith -     Patient's appointment with Eliseo on 02/27/25 needs to be moved to Dr. Vargas. She is having issues with her medication.   Patient is 40 year old Male from home lives with mother, extensive PMHx including T2DM, HTN, ESRD (MWF), CHF, COPD on home O2 (3L), bipolar disorder, schizophrenia,depression BIBA presenting after 2 unwitnessed syncopal episodes at home. Pt reports that around 3 pm today, he was feeling his usual shakiness ( started 2 weeks ago, goes away with cluching hands )  while getting a drink and woke up on the ground. His estimated down time was about 30 minutes. The patient then reports that he went to his room, walked back into the kitchen to get a drink, relaxed in his room, and then had another syncopal episode when he went to put the cup in the sink. The patient reports remembering his legs giving out from under him ( knees) and then falling backwards. The patient estimates down time to be about 45 minutes. The patient reports he has been having shakiness since 2 weeks. The patient endorses left knee pain, but denies any back pain. The patient reports posterior headache where he thinks he may have hit his head, but denies visual changes, numbness, tingling, weakness. The patient is on antipsychotic meds only. The patient reports shortness of breath, but states that this is chronic given his COPD take sno meds for other medical problems. The patient denies fever, chills, chest pain, palpitations, abdominal pain, nausea, vomiting, changes in bowel movement, changes in urination. Pt denies any dizziness , aura before fall, ringing in ear, ear fullness, migrain hx. Pt donot know if there was any recent medication change as his mother take care of all his meds.

## 2024-12-13 NOTE — ED PROVIDER NOTE - ENMT NEGATIVE STATEMENT, MLM
Left message for patient to return phone call   Ears: no ear pain and no hearing problems.Nose: no nasal congestion and no nasal drainage.Mouth/Throat: no dysphagia, no hoarseness and no throat pain.Neck: no lumps, no pain, no stiffness and no swollen glands.

## 2024-12-19 ENCOUNTER — APPOINTMENT (OUTPATIENT)
Dept: PSYCHIATRY | Facility: CLINIC | Age: 44
End: 2024-12-19

## 2024-12-19 ENCOUNTER — APPOINTMENT (OUTPATIENT)
Dept: SURGERY | Facility: CLINIC | Age: 44
End: 2024-12-19
Payer: MEDICARE

## 2024-12-19 VITALS
SYSTOLIC BLOOD PRESSURE: 157 MMHG | BODY MASS INDEX: 36.45 KG/M2 | HEIGHT: 78 IN | DIASTOLIC BLOOD PRESSURE: 81 MMHG | HEART RATE: 78 BPM | WEIGHT: 315 LBS | OXYGEN SATURATION: 91 %

## 2024-12-19 DIAGNOSIS — E66.01 MORBID (SEVERE) OBESITY DUE TO EXCESS CALORIES: ICD-10-CM

## 2024-12-19 PROCEDURE — 99213 OFFICE O/P EST LOW 20 MIN: CPT

## 2024-12-19 PROCEDURE — 90832 PSYTX W PT 30 MINUTES: CPT | Mod: 2W

## 2024-12-19 NOTE — PHARMACOTHERAPY INTERVENTION NOTE - COMMENTS
Three times a day with meals [FreeTextEntry1] : NIOX 26  PPB MILD POS AIRWAY INFLAMMATION 12/19/24  DMITRI No BD 12/19/24  flow  rates WNL  minimal OAD NIOX 24 normal range October 21, 2024 October 21, 2024 Spirometry flow rates are normal No decline in overall pulmonary physiology  NIOX 23 normal range July 8, 2024 Spirometry July 8, 2024 Normal spirometric flow rates No bronchodilator sponsor in FEV1 Preop surgical testing CBC normal range WBC 7.56 hemoglobin 13.5 hematocrit 39.9 platelet count 280,000 Serum electrolytes normal Creatinine 0.67 Serum potassium 3.3   NIOX  21  ppb WNL  3/4/24  PFT 3/4/24  Dmitri Nl flow rates  No BD at FEV!  Lung Volumes nl range  DLCO 120 % pred WNL HGB 14.2 stable / normal pulmonary physiology  Blood draw to recheck serum potassium Received preop laboratory data Noted serum potassium 3.1 Renal function normal creatinine 0.84 CBC normal Chest x-ray 3 March 4, 2024 Cardiac size normal AICD identified Sutures overlying left upper chest wall No parenchymal infiltrate pleural effusion or dominant pulmonary nodule Soft tissue bony structures unremarkable   NIOX  38 ppb hx asthma 12/18/23  Dmitri No BD 12/18/23  normal flow rates  NIOX  24  ppb WNL  8/28/23 [place] No BD  nl flow rates  PFT 5/18/23  flow  rates nl  No BD at FEV!  Lung Volumes  nl  DLCO 114 % HGB 13.2 NIOX  30  ppb mild  bronchial inflammation  Chest x-ray PA lateral May 18, 2023 Cardiac size normal Positive left upper lateral sutures identified post breast surgery AICD defibrillator in place Lung fields are clear No parenchymal infiltrates pleural effusions or dominant pulmonary nodules   Spirometry no bronchodilator January 5,2023 Flow rates normal Mild obstructive ventilatory impairment Ex 29 PPD January 5, 2023 consistent with mild bronchial inflammation  PFT 2/26/22 Well preserved flow rates mild OAD ratio 75 TLC 1213 % pred  Lung volumes nl DLCO 99 % nl HGB 13.7  EKG February 2, 2022 Heart rate of 104 Otherwise normal no acute changes no arrhythmias  Chest x-ray PA lateral February 2, 2022 Normal cardiac size Sutures overlying left chest wall consistent with prior breast surgery Single-chamber AICD identified Clear lung fields No evidence of parenchymal infiltrates pleural effusions dominant pulmonary nodules No evidence for residual Covid pneumonia   Chest x-ray PA lateral October 19, 2020 Normal cardiac size Sutures overlying left upper chest wall Single-chamber cardiac device most consistent with AICD Implants identified Clear lungs without parenchymal infiltrates pleural effusions dominant pulmonary nodules No evidence for adenopathy in the hilum appreciated Impression AICD   nitric oxide 62 2/25 2020  PFT February 25, 2020 Spirometry normal flow rates Mild obstructive pattern Borderline 10% response to bronchodilator at the FEV1 Lung volumes normal range Diffusion normal 170% predicted. Hemoglobin 13.4  Chest x-ray PA lateral projection February 25, 2020 Normal cardiac size Sutures left upper overlying chest Single-chamber cardiac device consistent with an AICD Overall clear lung fields without parenchymal infiltrates pleural effusions dominant pulmonary nodules Soft tissue bony structures grossly normal Shruthi mediastinum normal

## 2024-12-26 NOTE — DIETITIAN INITIAL EVALUATION ADULT. - NS AS NUTRI DX WEIGHT
Pt BIB parents to ED c/o cough and runny nose x2 days. Denies any pain, SOB or any other complaints at this time. Aaox4, NAD noted.    Obese, Class III

## 2024-12-26 NOTE — PROGRESS NOTE ADULT - PROBLEM SELECTOR PROBLEM 9
Constitutional: NAD, alert, verbal  HEENT: NCAT, EOMi, PERRL  Cardiac: RRR no MRG  Resp: clear, no wheezing or crackles  GI: ab soft ntnd, no r/g  MSK/Ext: no edema  Neuro: REILLY  Skin: No rashes Drug abuse

## 2025-01-05 NOTE — DISCHARGE NOTE PROVIDER - NSDCQMERRANDS_GEN_ALL_CORE
..  Palliative Care Inpatient Consult    NAME:  Héctor Tomas  MEDICAL RECORD NUMBER:  [id number]  AGE: 62 y.o.   GENDER: male  : 1962  TODAY'S DATE:  2025    Reasons for Consultation:    Symptom and/or pain management  Provision of information regarding PC and/or hospice philosophies  Complex, time-intensive communication and interdisciplinary psychosocial support  Clarification of goals of care and/or assistance with difficult decision-making  Guidance in regards to resources and transition(s)    Members of PC team contributing to this consultation are : Mallory Ferrell, Palliative Care APRN-CNP    Plan      Palliative Interaction: I went to see patient and he was lying in bed with eyes closed on NC with bilateral soft wrist restraints in place. He opened his eyes to verbal stimuli and was calm but would not answer any of my questions and closed his eyes. No family present but 1:1 sitter there. She tells me that patient has tried to kick staff this hospital stay. She reports that she feeds him and he has good appetite. She reports it taking multiple staff to change him as this increases behaviors. He is in a brief.     I reviewed chart and unable to find any kind of cancer diagnosis. There is notation for dementia but I do not see any home meds. Per notes, patient was on hospice care but was recently discharged for unknown reason. We will try to contact facility to see what hospice agency they used and try to gain some in site on qualifying diagnosis and reason for discharge. If Dx is dementia, he would need to be atleast at a FAST stage 7A to qualify. He came in with possible withdraw, so baseline status is unknown, will also try to gather in site on this from staff at facility.     Support offered. It appears patient has a guardian. We will need copy of this document as well. RN updated. Patients code status is documented at [facility] at this time.     Education/support to staff  Providing support for 
No
Ventricular Rate 123    Atrial Rate 123    P-R Interval 116    QRS Duration 76    Q-T Interval 330    QTc Calculation (Bazett) 472    P Axis 74    R Axis 43    T Axis 56    Narrative    Sinus tachycardia  Nonspecific ST and T wave abnormality  Abnormal ECG  When compared with ECG of 01-JAN-2025 23:45, (unconfirmed)  Aberrant conduction is no longer Present        Code Status: DNR-CC     ADVANCED CARE PLANNING:  Patient has capacity for medical decisions: no  Health Care Power of : yes- need copy of guardian paperwork   Living Will: not asked     Personal, Social, and Family History  Marital Status: single  Living situation:nursing home  Importance of jimmy/Religious/spiritual beliefs: [] Very [] Somewhat [] Not   Psychological Distress:   Does patient understand diagnosis/treatment? not asked  Does caregiver understand diagnosis/treatment? not asked      Assessment        REVIEW OF SYSTEMS  Unable to assess- patient is groggy and not answering questions when awakened.     PHYSICAL ASSESSMENT:  Constitutional: +groggy and nonverbal   Head: Normocephalic and atraumatic.   Eyes:  Pupils are equal, round   Neck: Normal range of motion. Neck supple. No tracheal deviation present.   Cardiovascular: Normal rate and regular rhythm, S1, S2, no murmur   Pulmonary/Chest: Effort normal and breath sounds normal. No rales or wheezes. +NC  Abdomen: Soft. No tenderness, not distended, no ascites, no organomegaly   Musculoskeletal: Normal range of motion. No edema lower ext. +bilateral soft wrist restraints in place   Neurological: CN II-XII grossly intact, no focal neurological deficits   Skin: Normal turgor, no bleeding, no bruising     Palliative Performance Scale:  ___60%  Ambulation reduced; Significant disease; Can't do hobbies/housework; intake normal or reduced; occasional assist; LOC full/confusion  ___50%  Mainly sit/lie; Extensive disease; Can't do any work; Considerable assist; intake normal or reduced; LOC 
done

## 2025-01-08 NOTE — ED ADULT TRIAGE NOTE - OTHER COMPLAINTS
[facility]   PODIATRY PATIENT NOTE    REQUESTING PROVIDER: Christi Hagen,*    CHIEF COMPLAINT:   Chief Complaint   Patient presents with    Office Visit     Pain in toe of right foot       SUBJECTIVE:  HISTORY OF PRESENT ILLNESS:  Sharee Fishman is a 23 year old female , new patient, seen today for 2 separate and identifiable foot problems:   Problem #1: Chronic problem with exacerbation: Patient relates approximately 3 months ago she fell forward by ramming her right great toe joint into a cement island.  Patient relates since that time she has had chronic pain in her right great toe joint.  She describes the pain as an ache and throb.  Her symptoms are much more intense the longer she is on her feet.  She will also have occasional swelling and warmth in the area.  Initially patient did not seek immediate medical treatment after her injury.  Approximately 1 month later patient had continued pain and went to urgent care on 11-.  There they took 3 views of the right first MTPJ x-rays.  These were read to be within normal limits without bone fracture, dislocation, or subluxation.  Patient was given a prescription for prednisone, and a postop boot.  Patient states she never took the prednisone because she completely forgot about it.  Patient has been wearing the boot intermittently and states if she needs to be on her feet for longer periods of time it does help with pain.  Patient states at this time she is struggling with pushoff strength, speed.  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    Problem #2: Patient points to both bunions and states she has had bunion joint pain for the past several years.  Even prior to the accident to her right great toe joint patient states both joints have given her symptoms of moderate to severe achiness, at times bump pain.  Patient states it is difficult to wear certain shoe gear to be on her feet for long periods of time.  Patient  is interested in surgical intervention and would like a referral.    REVIEW OF SYSTEMS:  Constitutional: Negative for fever and chills.  Skin: Negative for rash.  Vascular: No open sores to feet, no redness  Neurologic: Feet were negative for abnormality in sensory or motor function.  Patient denies episodes of burning tingling numbness to the feet.  Endocrine: Negative for diabetes  Extremities:  Edema: none.  Tenderness: Right great toe joint injury.  Also chronic bilateral bunion pain.    HISTORIES:  I have reviewed the past medical history, family history, social history, medications and allergies listed in the medical record as well as the nursing notes for this encounter.    OBJECTIVE  PHYSICAL EXAM:  Visit Vitals  LMP 12/13/2024 (Exact Date)     General:  No apparent distress. Alert and oriented.    Neurological:  Protective sensation: Intact to light touch bilaterally. No burning, tingling, numbness. Patient did pass [place] Gabriel 5.07 monofilament wire exam to detect protective sensorium to 5/5 test sites both feet.  Normal tone bilateral lower extremities.    Vascular:  Pulses: Right  Dorsalis Pedis 3/4 and Left dorsalis Pedis pulses 3/4.  Right posterior tibial pulse 2/4 and  Left Posterior Tibial pulse 2/4  Capillary refill < three seconds bilaterally in digits 1-5  No edema of bilateral feet.  Hair growth present at the level of the digits.    Dermatologic:  Skin: Clean, dry and intact bilaterally.   Skin texture to the bilateral lower extremities is WNL, no ecchymosis or erythema, no rash to either foot or ankle.  There are no open sores to bilateral feet.  Nails all toes, both feet are WNL.    Musculoskeletal:  Nonweightbearing exam: Attention right first MTPJ.  Patient does have moderate sharp pain with deep intra-articular palpation with joint space distraction.  Patient also has sharp pain at palpation over the medial eminence of her right first MTPJ.  There is no erythema, no edema, no  ecchymosis, no heat.  There is tenderness to range of motion with forefoot loading particularly dorsiflexion.    Nonweightbearing exam: Bilateral first MTPJ.  Patient presents with moderate, flexible, bilateral hallux valgus deformity with lateral deviation of great toe off 1st metatarsal head pressing into 2nd toes.  There is minimal restricted range of motion both dorsiflexion and plantar flexion  There is deep intra-articular joint pain with joint distraction as noted above right foot only.  Crepitus is absent bilateral.  There is joint contracture at the 1st MTPJ with prominence of the medial eminence of the 1st metatarsal head bilateral.  There are no skin changes of atrophy consistent with chronic shoe pressure.    There are no blisters, no ulcerations, no erythema noted, no bursa formations noted.    Foot x-ray review: Previously ordered foot x-rays isolated to first ray at urgent care on 11- note no subluxation, dislocation or fracture to the first MTPJ.  Sesamoids are intact though laterally deviated.  X-rays are otherwise unremarkable.    Foot X-ray Review: New foot x-rays ordered this date three views weight-bearing x-rays taken this date  X-rays were reviewed after patient left office visit.  Awaiting official radiology impression, all questions were answered.  Foot x-rays show:  There is increased 1st IM angle to approximately 14 degrees bilateral.  There is lateral deviation of great toe off 1st metatarsal head by 30%.  There is minimal joint space narrowing at the level of the 1st MTPJ with prominence of the medial eminence  There are no cystic changes noted to the medial eminence.  There is lateral deviation of the sesamoid apparatus into the 1st interspace bilateral..  There is no bone spurring off the head of the proximal phalanx and 1st metatarsal head bilateral.       ASSESSMENT:  1. Injury of great toe, right, initial encounter    2. Great toe pain, right    3. Joint pain of ankle and  chest discomfort foot, right    4. Acquired hallux valgus, left    5. Acquired hallux valgus, right        PLAN:     Orders Placed This Encounter    XR FOOT STANDING 3 OR MORE VIEWS BILATERAL    SERVICE TO PODIATRY    methylPREDNISolone (MEDROL DOSEPAK) 4 MG tablet      New patient history and physical with review of systems and documentation of findings.    Medical note review: Review of medical notes from urgent care on 11-.    X-ray review: Previously ordered right foot x-rays from urgent care and new foot x-rays ordered this date: Were reviewed.  Awaiting official radiology impression.    For problem #1: This date I did discuss with patient that she has chronic inflammation within the joint secondary to the jamming type injury with bruising to the cartilage.  I recommend an intra-articular steroid injection this date.  Patient deferred.  We then discussed trying the Medrol Dosepak, methylprednisolone oral route.  Patient agreed to trying the prescription.  Prescription drug management: New prescription: This date and new prescription was sent to patient's pharmacy for Medrol Dosepak to be taken as directed.    New referral:  This date a new surgical referral was sent the department of podiatry to Dr. Howe for surgical consultation for bilateral chronic painful bunions..    Return if symptoms worsen or fail to improve.

## 2025-01-16 ENCOUNTER — APPOINTMENT (OUTPATIENT)
Dept: PSYCHIATRY | Facility: CLINIC | Age: 45
End: 2025-01-16

## 2025-01-21 NOTE — ED PROVIDER NOTE - NS ED NOTE AC HIGH RISK COUNTRIES
NEPHROLOGY OFFICE PROGRESS NOTE   Thomas Horne 83 y.o. male MRN: [id number]  DATE: 01/21/25  Reason for visit: Continued evaluation and management of CKD    Assessment & Plan  Stage 3b chronic kidney disease (HCC)  Renal function is stable. SCr is 1.30.   Continue with good BP, glycemic and lipid control.   Check BMP tomorrow to follow up on renal function since hospital stay and since Torsemide and Jardiance were restarted.     Orders:    Basic metabolic panel; Future    Renal function panel; Future    Albumin / creatinine urine ratio; Future    Benign hypertension with chronic kidney disease  BP is below goal (<130/80)  Stop Amlodipine  Maintain off Losartan.     Chronic kidney disease-mineral and bone disorder  Check PTH and Vitamin D  Check Mg and phos.     Orders:    Magnesium; Future    PTH, intact; Future    Vitamin D 25 hydroxy; Future    Chronic diastolic congestive heart failure (HCC)  Currently compensated.   Continue with current diuretic regimen.     Iron deficiency anemia, unspecified iron deficiency anemia type  Iron levels from Dec 2024 show JESUS  Start oral iron 1 tab QOD.   Recheck CBC in 4 months.   Has iron studies for hematology in April 2025    Orders:    ferrous sulfate 324 (65 Fe) mg; Take 1 tablet (324 mg total) by mouth every other day    CBC; Future    Anemia of chronic kidney failure, unspecified stage  See above    Type 2 diabetes mellitus with diabetic polyneuropathy, with long-term current use of insulin (HCC)  On Jardiance, Metformin, Januvia and Lantus.         RENAL PERTINENT HISTORY:  Chronic kidney disease, stage IIIB:  Thomas's baseline serum creatinine is around 1.6 to 1.8.   The etiology of his CKD is multifactorial - hypertensive nephrosclerosis, diabetic kidney disease and cardiorenal syndrome.     Hypertension:  Home machine reads +10 higher than the office BP cuff.   Rx: Torsemide 20 mg OD, Metoprolol succ 100 mg OD, Amlodipine 2.5 mg OD.    Losartan on hold since Jan 2025  hospital stay.     Congestive heart failure:  12/13/24 Echo: EF 50%, abnormal diastolic function, mod to sev AS.   Rx: Torsemide 20 mg daily, Jardiance 25 mg OD.      Iron deficiency anemia:  S/p Venofer infusion?  Following with hematology.      Mineral bone disease:  Rx: none.     Proteinuria:  Now off Losartan.      Peripheral arterial disease  S/p R LE bypass in Nov 2023.   S/p angiogram and angioplasty in Jan 2025     Diabetes mellitus:  Managed by PCP  On metformin, Januvia, Lantus, and Jardiance     Hyperlipidemia:  On atorvastatin and fenofibrate.       Patient Instructions   You have chronic kidney disease (CKD) and your kidney function is stable.   Your iron level was low in the hospital - please start oral iron 1 tablet every other day.   Go for a BMP tomorrow with your blood cultures.   Continue to stay off the Losartan.   Continue with Torsemide and Jardiance.   Stop the Amlodipine.   Follow up in 4 months - please obtain blood work 1-2 weeks prior to the appointment.     SUBJECTIVE / INTERVAL HISTORY:  Thomas was last seen in the office in August 2024.   He presented to [facility] on January 3, 2025 after being sent in due to concern for a bone infection on the big toe of the right foot.  From [facility], he was transferred to [facility] on January 4, 2025 due to needing higher level of vascular care.  He had an angiogram done on January 7, 2025 with successful angioplasty and stenting of the right popliteal artery.  His creatinine on presentation was 1.73 and had gone up to 2.75 on January 5, 2025.  Thereafter, his renal function improved and was down to a creatinine of 1.30 on January 9, 2025.  He was discharged on torsemide and Jardiance. Losartan was stopped in the hospital. He was found to have gram-positive bacteremia in the hospital and completed antibiotics before discharge. He is going for repeat blood cultures tomorrow to follow-up on his staph and strep  "bacteremia.    Fatigue is improving.   No CP or SOB.   Has some leg swelling on the foot with the infection.   Not checking home BP.   BP at other MD offices has been on the low side.     PMH/PSH: HTN, DM, HLP, CAD s/p CABG 2002, s/p PCI, prostate cancer s/p prostatectomy, AAA s/p EVAR, nephrolithiasis, DDD s/p laminectomy, cholecystectomy, L leg DVT    Previous work up:   8/2/22 CT abdomen: 1 or more simple renal cysts. No hydronephrosis. Parenchymal thinning/scar at the R inferior pole.     6/17/23 echocardiogram: Mild to moderate LVH, EF 50%, G2DD    8/8/23 Renin 1.290, Aldosterone 4.3, Metanephrines 21.1, Normetanephrines 95.1.     ALLERGIES:   Allergies   Allergen Reactions    Lisinopril Rash and Lip Swelling     REVIEW OF SYSTEMS:  Review of Systems  All the pertinent systems were reviewed and were negative except as documented in the HPI.   General: No fevers, chills. + fatigue.   CV: No chest pain, + leg edema of R  Pulm: No cough, shortness of breath.  GI: No nausea, vomiting, diarrhea.  : No hematuria.  Neuro: No lightheadedness    OBJECTIVE:  /58 (BP Location: Left arm, Patient Position: Sitting, Cuff Size: Large)   Pulse (!) 44   Ht 6' 2\" (1.88 m)   Wt 101 kg (222 lb)   SpO2 91%   BMI 28.50 kg/m²   Current Weight: Weight - Scale: 101 kg (222 lb) Body mass index is 28.5 kg/m².  Physical Exam  General: conscious, cooperative, no distress  Skin: dry  Eyes: pink conjunctivae  ENT: moist mucous membranes  Respiratory: equal chest expansion, clear breath sounds.  Cardiovascular: distinct heart sounds, normal rate, regular rhythm, no rub  Abdomen: soft, non tender, non distended, normal bowel sounds  Extremities: trace R LE edema.   Genitourinary: no gilbert catheter.   Neuro: awake, alert.   Psych: appropriate affect    Medications:  Current Outpatient Medications:     amLODIPine (NORVASC) 2.5 mg tablet, Take 1 tablet (2.5 mg total) by mouth daily, Disp: 90 tablet, Rfl: 1    atorvastatin (LIPITOR) " 40 mg tablet, Take 1 tablet (40 mg total) by mouth daily with dinner, Disp: , Rfl:     clopidogrel (PLAVIX) 75 mg tablet, Take 1 tablet (75 mg total) by mouth daily, Disp: 30 tablet, Rfl: 3    DULoxetine (CYMBALTA) 30 mg delayed release capsule, Take 1 capsule (30 mg total) by mouth daily, Disp: 90 capsule, Rfl: 0    Empagliflozin (Jardiance) 25 MG TABS, TAKE 1 TABLET BY MOUTH DAILY, Disp: 90 tablet, Rfl: 1    fenofibrate 160 MG tablet, TAKE 1 TABLET BY MOUTH DAILY, Disp: 90 tablet, Rfl: 1    insulin glargine (LANTUS) 100 units/mL subcutaneous injection, Inject 15 Units under the skin daily at bedtime, Disp: 10 mL, Rfl: 0    isosorbide mononitrate (IMDUR) 30 mg 24 hr tablet, TAKE 3 TABLETS BY MOUTH DAILY, Disp: 270 tablet, Rfl: 1    metFORMIN (GLUCOPHAGE) 500 mg tablet, TAKE 1 TABLET BY MOUTH TWICE  DAILY WITH MEALS, Disp: 180 tablet, Rfl: 1    metoprolol succinate (TOPROL-XL) 100 mg 24 hr tablet, Take 1 tablet (100 mg total) by mouth daily, Disp: 90 tablet, Rfl: 1    Multiple Vitamins-Minerals (MULTIVITAMIN MEN 50+ PO), Take by mouth daily, Disp: , Rfl:     Omega-3 Fatty Acids (fish oil) 1,000 mg, Take 4,000 mg by mouth 2 (two) times a day, Disp: , Rfl:     oxyCODONE-acetaminophen (PERCOCET) 5-325 mg per tablet, Take 1 tablet by mouth 2 (two) times a day as needed for moderate pain or severe pain, Disp: , Rfl:     pantoprazole (PROTONIX) 40 mg tablet, TAKE 1 TABLET BY MOUTH DAILY, Disp: 90 tablet, Rfl: 1    pentoxifylline (TRENtal) 400 mg ER tablet, TAKE 1 TABLET BY MOUTH 3 TIMES  DAILY WITH MEALS, Disp: 270 tablet, Rfl: 3    pregabalin (LYRICA) 150 mg capsule, Take 1 capsule (150 mg total) by mouth 3 (three) times a day, Disp: 270 capsule, Rfl: 0    ranolazine (RANEXA) 1000 MG SR tablet, Take 1 tablet (1,000 mg total) by mouth 2 (two) times a day, Disp: 180 tablet, Rfl: 3    sitaGLIPtin (JANUVIA) 25 mg tablet, Take 1 tablet (25 mg total) by mouth daily, Disp: 30 tablet, Rfl: 0    torsemide (DEMADEX) 20 mg tablet,  TAKE 1 TABLET BY MOUTH DAILY, Disp: 90 tablet, Rfl: 1    acetaminophen (TYLENOL) 325 mg tablet, Take 2 tablets (650 mg total) by mouth every 6 (six) hours as needed for mild pain, Disp: , Rfl:     aspirin 81 mg chewable tablet, Chew 81 mg daily (Patient not taking: Reported on 1/21/2025), Disp: , Rfl:     Blood Glucose Monitoring Suppl (OneTouch Verio Reflect) w/Device KIT, Check blood sugars twice daily. Please substitute with appropriate alternative as covered by patient's insurance. Dx: E11.65, Disp: 1 kit, Rfl: 0    Droplet Pen Needles 32G X 4 MM MISC, USE EVERY EVENING, Disp: 100 each, Rfl: 5    nitroglycerin (NITROSTAT) 0.4 mg SL tablet, Place 1 tablet (0.4 mg total) under the tongue every 5 (five) minutes as needed for chest pain (Patient not taking: Reported on 1/15/2025), Disp: 30 tablet, Rfl: 0    OneTouch Delica Lancets 33G MISC, Check blood sugars twice daily. Please substitute with appropriate alternative as covered by patient's insurance. Dx: E11.65, Disp: 200 each, Rfl: 3    Laboratory Results:  Lab Results   Component Value Date    WBC 6.35 01/09/2025    HGB 11.4 (L) 01/09/2025    HCT 35.5 (L) 01/09/2025    MCV 84 01/09/2025     01/09/2025     Lab Results   Component Value Date    SODIUM 138 01/09/2025    K 3.5 01/09/2025     01/09/2025    CO2 30 01/09/2025    BUN 31 (H) 01/09/2025    CREATININE 1.30 01/09/2025    GLUC 142 (H) 01/09/2025    CALCIUM 9.0 01/09/2025      No

## 2025-01-26 NOTE — ED PROVIDER NOTE - PMH
Physical Therapy    Patient not seen in therapy.     Discontinue therapy: patient request    Introduced self and role to pt. Pt denies concerns with balance or ability to perform mobility to return home. She uses a 4WW with seat at baseline, has been using 2WW in hospital , reports having walked a couple of times in hallway for use of the bathroom. Pt denies need for skilled PT , indicates she will continue activity with nursing to maintain her abilities. Updated RN Rosaline.   Signing off.        OBJECTIVE                         Documented in the chart in the following areas: Prior Function.        Therapy procedure time and total treatment time can be found documented on the Time Entry flowsheet   Anxiety    Asthma with COPD    Bipolar disorder    COPD (chronic obstructive pulmonary disease)    Diabetes mellitus, type 2    DM (diabetes mellitus)    ESRD on dialysis    HTN (hypertension)    Hypertension    Migraine    Morbid obesity with BMI of 40.0-44.9, adult    Renal failure    Schizophrenia

## 2025-01-27 ENCOUNTER — APPOINTMENT (OUTPATIENT)
Dept: PSYCHIATRY | Facility: CLINIC | Age: 45
End: 2025-01-27
Payer: MEDICARE

## 2025-01-27 PROCEDURE — 90832 PSYTX W PT 30 MINUTES: CPT | Mod: 2W

## 2025-01-30 ENCOUNTER — NON-APPOINTMENT (OUTPATIENT)
Age: 45
End: 2025-01-30

## 2025-02-07 ENCOUNTER — EMERGENCY (EMERGENCY)
Facility: HOSPITAL | Age: 45
LOS: 1 days | Discharge: HOME | End: 2025-02-07
Attending: EMERGENCY MEDICINE
Payer: MEDICARE

## 2025-02-07 VITALS
DIASTOLIC BLOOD PRESSURE: 71 MMHG | HEART RATE: 64 BPM | OXYGEN SATURATION: 98 % | SYSTOLIC BLOOD PRESSURE: 159 MMHG | RESPIRATION RATE: 17 BRPM | TEMPERATURE: 97 F

## 2025-02-07 VITALS
OXYGEN SATURATION: 97 % | HEART RATE: 67 BPM | SYSTOLIC BLOOD PRESSURE: 128 MMHG | RESPIRATION RATE: 18 BRPM | WEIGHT: 315 LBS | HEIGHT: 78 IN | DIASTOLIC BLOOD PRESSURE: 72 MMHG | TEMPERATURE: 98 F

## 2025-02-07 DIAGNOSIS — Z98.890 OTHER SPECIFIED POSTPROCEDURAL STATES: Chronic | ICD-10-CM

## 2025-02-07 LAB
ALBUMIN SERPL ELPH-MCNC: 3.3 G/DL — LOW (ref 3.5–5)
ALP SERPL-CCNC: 112 U/L — SIGNIFICANT CHANGE UP (ref 40–120)
ALT FLD-CCNC: 41 U/L DA — SIGNIFICANT CHANGE UP (ref 10–60)
ANION GAP SERPL CALC-SCNC: 6 MMOL/L — SIGNIFICANT CHANGE UP (ref 5–17)
APTT BLD: 57.4 SEC — HIGH (ref 24.5–35.6)
AST SERPL-CCNC: 25 U/L — SIGNIFICANT CHANGE UP (ref 10–40)
BASOPHILS # BLD AUTO: 0.01 K/UL — SIGNIFICANT CHANGE UP (ref 0–0.2)
BASOPHILS NFR BLD AUTO: 0.2 % — SIGNIFICANT CHANGE UP (ref 0–2)
BILIRUB SERPL-MCNC: 0.4 MG/DL — SIGNIFICANT CHANGE UP (ref 0.2–1.2)
BUN SERPL-MCNC: 46 MG/DL — HIGH (ref 7–18)
CALCIUM SERPL-MCNC: 8.6 MG/DL — SIGNIFICANT CHANGE UP (ref 8.4–10.5)
CHLORIDE SERPL-SCNC: 99 MMOL/L — SIGNIFICANT CHANGE UP (ref 96–108)
CK SERPL-CCNC: 37 U/L — SIGNIFICANT CHANGE UP (ref 35–232)
CO2 SERPL-SCNC: 30 MMOL/L — SIGNIFICANT CHANGE UP (ref 22–31)
CREAT SERPL-MCNC: 4.87 MG/DL — HIGH (ref 0.5–1.3)
EGFR: 14 ML/MIN/1.73M2 — LOW
EOSINOPHIL # BLD AUTO: 0 K/UL — SIGNIFICANT CHANGE UP (ref 0–0.5)
EOSINOPHIL NFR BLD AUTO: 0 % — SIGNIFICANT CHANGE UP (ref 0–6)
FLUAV AG NPH QL: SIGNIFICANT CHANGE UP
FLUBV AG NPH QL: SIGNIFICANT CHANGE UP
GLUCOSE SERPL-MCNC: 93 MG/DL — SIGNIFICANT CHANGE UP (ref 70–99)
HCT VFR BLD CALC: 32.7 % — LOW (ref 39–50)
HGB BLD-MCNC: 10.5 G/DL — LOW (ref 13–17)
IMM GRANULOCYTES NFR BLD AUTO: 0.4 % — SIGNIFICANT CHANGE UP (ref 0–0.9)
INR BLD: 1 RATIO — SIGNIFICANT CHANGE UP (ref 0.85–1.16)
LYMPHOCYTES # BLD AUTO: 0.65 K/UL — LOW (ref 1–3.3)
LYMPHOCYTES # BLD AUTO: 13.2 % — SIGNIFICANT CHANGE UP (ref 13–44)
MCHC RBC-ENTMCNC: 32.1 G/DL — SIGNIFICANT CHANGE UP (ref 32–36)
MCHC RBC-ENTMCNC: 32.1 PG — SIGNIFICANT CHANGE UP (ref 27–34)
MCV RBC AUTO: 100 FL — SIGNIFICANT CHANGE UP (ref 80–100)
MONOCYTES # BLD AUTO: 0.8 K/UL — SIGNIFICANT CHANGE UP (ref 0–0.9)
MONOCYTES NFR BLD AUTO: 16.3 % — HIGH (ref 2–14)
NEUTROPHILS # BLD AUTO: 3.43 K/UL — SIGNIFICANT CHANGE UP (ref 1.8–7.4)
NEUTROPHILS NFR BLD AUTO: 69.9 % — SIGNIFICANT CHANGE UP (ref 43–77)
NRBC # BLD: 0 /100 WBCS — SIGNIFICANT CHANGE UP (ref 0–0)
NRBC BLD-RTO: 0 /100 WBCS — SIGNIFICANT CHANGE UP (ref 0–0)
NT-PROBNP SERPL-SCNC: HIGH PG/ML (ref 0–125)
PLATELET # BLD AUTO: 133 K/UL — LOW (ref 150–400)
POTASSIUM SERPL-MCNC: 4.2 MMOL/L — SIGNIFICANT CHANGE UP (ref 3.5–5.3)
POTASSIUM SERPL-SCNC: 4.2 MMOL/L — SIGNIFICANT CHANGE UP (ref 3.5–5.3)
PROT SERPL-MCNC: 8.2 G/DL — SIGNIFICANT CHANGE UP (ref 6–8.3)
PROTHROM AB SERPL-ACNC: 11.6 SEC — SIGNIFICANT CHANGE UP (ref 9.9–13.4)
RBC # BLD: 3.27 M/UL — LOW (ref 4.2–5.8)
RBC # FLD: 13.3 % — SIGNIFICANT CHANGE UP (ref 10.3–14.5)
RSV RNA NPH QL NAA+NON-PROBE: SIGNIFICANT CHANGE UP
SARS-COV-2 RNA SPEC QL NAA+PROBE: SIGNIFICANT CHANGE UP
SODIUM SERPL-SCNC: 135 MMOL/L — SIGNIFICANT CHANGE UP (ref 135–145)
TROPONIN I, HIGH SENSITIVITY RESULT: 31 NG/L — SIGNIFICANT CHANGE UP
WBC # BLD: 4.91 K/UL — SIGNIFICANT CHANGE UP (ref 3.8–10.5)
WBC # FLD AUTO: 4.91 K/UL — SIGNIFICANT CHANGE UP (ref 3.8–10.5)

## 2025-02-07 PROCEDURE — 85610 PROTHROMBIN TIME: CPT

## 2025-02-07 PROCEDURE — 93010 ELECTROCARDIOGRAM REPORT: CPT

## 2025-02-07 PROCEDURE — 36415 COLL VENOUS BLD VENIPUNCTURE: CPT

## 2025-02-07 PROCEDURE — 80053 COMPREHEN METABOLIC PANEL: CPT

## 2025-02-07 PROCEDURE — 93005 ELECTROCARDIOGRAM TRACING: CPT

## 2025-02-07 PROCEDURE — 74176 CT ABD & PELVIS W/O CONTRAST: CPT | Mod: MC

## 2025-02-07 PROCEDURE — 99284 EMERGENCY DEPT VISIT MOD MDM: CPT | Mod: 25

## 2025-02-07 PROCEDURE — 99285 EMERGENCY DEPT VISIT HI MDM: CPT

## 2025-02-07 PROCEDURE — 87637 SARSCOV2&INF A&B&RSV AMP PRB: CPT

## 2025-02-07 PROCEDURE — 83880 ASSAY OF NATRIURETIC PEPTIDE: CPT

## 2025-02-07 PROCEDURE — 85730 THROMBOPLASTIN TIME PARTIAL: CPT

## 2025-02-07 PROCEDURE — 84484 ASSAY OF TROPONIN QUANT: CPT

## 2025-02-07 PROCEDURE — 82550 ASSAY OF CK (CPK): CPT

## 2025-02-07 PROCEDURE — 96374 THER/PROPH/DIAG INJ IV PUSH: CPT

## 2025-02-07 PROCEDURE — 74176 CT ABD & PELVIS W/O CONTRAST: CPT | Mod: 26

## 2025-02-07 PROCEDURE — 71045 X-RAY EXAM CHEST 1 VIEW: CPT

## 2025-02-07 PROCEDURE — 71045 X-RAY EXAM CHEST 1 VIEW: CPT | Mod: 26

## 2025-02-07 PROCEDURE — 85025 COMPLETE CBC W/AUTO DIFF WBC: CPT

## 2025-02-07 PROCEDURE — 0241U: CPT

## 2025-02-07 RX ORDER — MORPHINE SULFATE 60 MG/1
4 TABLET, FILM COATED, EXTENDED RELEASE ORAL ONCE
Refills: 0 | Status: DISCONTINUED | OUTPATIENT
Start: 2025-02-07 | End: 2025-02-07

## 2025-02-07 RX ADMIN — MORPHINE SULFATE 4 MILLIGRAM(S): 60 TABLET, FILM COATED, EXTENDED RELEASE ORAL at 11:17

## 2025-02-07 RX ADMIN — MORPHINE SULFATE 4 MILLIGRAM(S): 60 TABLET, FILM COATED, EXTENDED RELEASE ORAL at 13:01

## 2025-02-07 NOTE — ED ADULT NURSE REASSESSMENT NOTE - NS ED NURSE REASSESS COMMENT FT1
Covering RN-  received  pt awake  oriented  x 3  , pt states  he  is  having  mid abdominal pain states  that pain is  still there after  the morphine  . pt given comfort measures no signs of  distress covering provider aware.VSS  stable.

## 2025-02-07 NOTE — ED PROVIDER NOTE - OBJECTIVE STATEMENT
44-year-old male end-stage renal disease on dialysis COPD on O2 24/7 presents with chest pain and dizziness with low blood pressure long term into his 5-hour hemodialysis session today.  Patient states has had this happen in the past that his blood pressure dropped and he felt unwell during dialysis but never this severe.  No eye symptoms.  Patient noticed a little bruise on his left lower back does not know how he got it does not recall any injury.

## 2025-02-07 NOTE — ED PROVIDER NOTE - PROGRESS NOTE DETAILS
Labs nonactionable patient feeling well except complaining of chronic back pain.  CT obtained unremarkable nonactionable.  Case discussed with Dr. La of nephrology who will have the patient come tomorrow to get additional dialysis.  Home an ambulance

## 2025-02-07 NOTE — ED PROVIDER NOTE - PATIENT PORTAL LINK FT
You can access the FollowMyHealth Patient Portal offered by [facility] by registering at the following website: [email or web address]. By joining [facility]’s FollowMyHealth portal, you will also be able to view your health information using other applications (apps) compatible with our system.

## 2025-02-07 NOTE — ED PROVIDER NOTE - NSFOLLOWUPINSTRUCTIONS_ED_ALL_ED_FT
Chest Pain    AMBULATORY CARE:    Chest pain can be caused by a range of conditions, from not serious to life-threatening. It is important to follow up with your healthcare provider to find the cause of your chest pain.    Common symptoms you may have with chest pain:    Fever or sweating    Nausea or vomiting    Shortness of breath    Discomfort or pressure that spreads from your chest to your back, jaw, or arm    A racing or slow heartbeat    Feeling weak, tired, or faint  Call your local emergency number (911 in the [place]) or have someone call if:    You have any of the following signs of a heart attack:  Squeezing, pressure, or pain in your chest    You may also have any of the following:  Discomfort or pain in your back, neck, jaw, stomach, or arm    Shortness of breath    Nausea or vomiting    Lightheadedness or a sudden cold sweat    Seek care immediately if:    You have chest discomfort that gets worse, even with medicine.    You cough or vomit blood.    Your bowel movements are black or bloody.    You cannot stop vomiting, or it hurts to swallow.  Call your doctor if:    You have questions or concerns about your condition or care.    Treatment for chest pain may include medicine to treat your symptoms while your healthcare provider finds the cause of your chest pain.    Medicines may be given to treat the cause of your chest pain. Examples include pain medicine, anxiety medicine, or medicines to increase blood flow to your heart.    Do not take certain medicines without asking your healthcare provider first. These include NSAIDs, herbal or vitamin supplements, and hormones, such as estrogen or progestin.    One or more stents may need to be placed in your heart if pain was caused by blockage. A stent is a wire mesh tube that helps hold your artery open.  Healthy living tips: If the cause of your chest pain is known, your healthcare provider will give you specific guidelines to follow. The following are general healthy guidelines:    Do not smoke. Nicotine and other chemicals in cigarettes and cigars can cause lung and heart damage. Ask your healthcare provider for information if you currently smoke and need help to quit. E-cigarettes or smokeless tobacco still contain nicotine. Talk to your healthcare provider before you use these products.    Choose a variety of healthy foods as often as possible. Include fresh, frozen, or canned fruits and vegetables. Also include low-fat dairy products, fish, chicken (without skin), and lean meats. Your healthcare provider or a dietitian can help you create meal plans. You may need to avoid certain foods or drinks if your pain is caused by a digestion problem.  Healthy Foods      Lower your sodium (salt) intake. Limit foods that are high in sodium, such as canned foods, salty snacks, and cold cuts. If you add salt when you cook food, do not add more at the table. Choose low-sodium canned foods as much as possible.        Drink plenty of water every day. Water helps your body to control your temperature and blood pressure. Ask your healthcare provider how much water you should drink every day.    Ask about activity. Your healthcare provider will tell you which activities to limit or avoid. Ask when you can drive, return to work, and have sex. Ask about the best exercise plan for you.    Maintain a healthy weight. Ask your healthcare provider what a healthy weight is for you. Ask him or her to help you create a safe weight loss plan if you are overweight.    Ask about vaccines you may need. Your healthcare provider can tell you which vaccines you need, and when to get them. The following vaccines help prevent diseases that can become serious for a person with a heart condition:  The influenza (flu) vaccine is given each year. Get a flu vaccine as soon as recommended, usually in September or October.    The pneumonia vaccine is usually given every 5 years. Your healthcare provider may recommend the pneumonia vaccine if you are 65 or older.    COVID-19 vaccines are given to adults as a shot. At least 1 dose of an updated vaccine is recommended for all adults. COVID-19 vaccines are updated throughout the year. Adults 65 or older need a second dose of updated vaccine at least 4 months after the first dose. Your healthcare provider can help you schedule all needed doses as updated vaccines become available.  Prevent Heart Disease   Follow up with your doctor within 72 hours, or as directed: You may need to return for more tests to find the cause of your chest pain. You may be referred to a specialist, such as a cardiologist or gastroenterologist. Write down your questions so you remember to ask them during your visits.    © [employer] 1973, 2025

## 2025-02-07 NOTE — ED PROVIDER NOTE - PHYSICAL EXAMINATION
heart regular lungs clear abdomen nontender 3 cm superficial ecchymosis to the left lower back nontender no calf swelling no edema patient is awake alert speaking full sentences O2 sat 98% on 4 L

## 2025-02-07 NOTE — ED PROVIDER NOTE - CLINICAL SUMMARY MEDICAL DECISION MAKING FREE TEXT BOX
patient with low blood pressure chest pain in the setting of hemodialysis.  Will do labs x-ray reassess

## 2025-02-07 NOTE — ED ADULT NURSE NOTE - NSFALLRISKASMTTYPE_ED_ALL_ED
Initial (On Arrival) Rituxan Counseling:  I discussed with the patient the risks of Rituxan infusions. Side effects can include infusion reactions, severe drug rashes including mucocutaneous reactions, reactivation of latent hepatitis and other infections and rarely progressive multifocal leukoencephalopathy.  All of the patient's questions and concerns were addressed.

## 2025-02-07 NOTE — ED ADULT NURSE NOTE - OBJECTIVE STATEMENT
pt c/o chest pain and Left lower back pain x week. Pt with hx.of ESRD, HD M/W/F fistula noted to Rt lower forearm. +bruising to left lower back

## 2025-02-10 NOTE — ED PROVIDER NOTE - OBJECTIVE STATEMENT
patient with complex medical history on HD, last HD yesterday, here with shortness of breath and worsening leg swelling for 1 day. notes the fluid in his legs is up to his thighs, making it hard to walk. no chest pain no fever no cough Improved

## 2025-02-27 ENCOUNTER — APPOINTMENT (OUTPATIENT)
Dept: SURGERY | Facility: CLINIC | Age: 45
End: 2025-02-27
Payer: MEDICARE

## 2025-02-27 VITALS
HEART RATE: 80 BPM | TEMPERATURE: 97.8 F | DIASTOLIC BLOOD PRESSURE: 82 MMHG | BODY MASS INDEX: 36.45 KG/M2 | SYSTOLIC BLOOD PRESSURE: 171 MMHG | WEIGHT: 315 LBS | OXYGEN SATURATION: 92 % | HEIGHT: 78 IN

## 2025-02-27 DIAGNOSIS — E66.01 MORBID (SEVERE) OBESITY DUE TO EXCESS CALORIES: ICD-10-CM

## 2025-02-27 PROCEDURE — 99213 OFFICE O/P EST LOW 20 MIN: CPT

## 2025-02-27 RX ORDER — QUETIAPINE 400 MG/1
400 TABLET, FILM COATED ORAL
Refills: 0 | Status: ACTIVE | COMMUNITY

## 2025-02-28 NOTE — PATIENT PROFILE ADULT - NSPROCHRONICPAIN_GEN_A_NUR
Bilateral wrist restraints released. Patient attempts to reach for ETT. Bilateral wrist restraints continued at this time.    yes

## 2025-03-26 NOTE — H&P ADULT - NSICDXPASTMEDICALHX_GEN_ALL_CORE_FT
Imaging Studies/Medications PAST MEDICAL HISTORY:  Bipolar disorder     DM (diabetes mellitus)     ESRD on dialysis     HTN (hypertension)     Migraine     Morbid obesity with BMI of 40.0-44.9, adult     Schizophrenia

## 2025-03-26 NOTE — ED PROVIDER NOTE - PHYSICAL EXAMINATION
Detail Level: Generalized Detail Level: Detailed *GEN: NAD; well appearing; A+O x3   *HEAD: NC/AT   *EYES/NOSE: PERRL & EOMI b/l  *THROAT: airway patent, moist mucous membranes  *NECK: Neck supple, no masses  *PULMONARY: symmetric breath sounds; diminished breath sounds at jeremy bases with expiratory wheezing in moderate respiratory distress  *CARDIAC: s1s2, regular rhythm, no Murmur  *ABDOMEN:  ND, NT, soft, no guarding, no rebound, no masses   *BACK: no CVA tenderness, Normal  spine   *EXTREMITIES: symmetric pulses, 2+ dp & radial pulses, capillary refill < 2 seconds, no cyanosis; BLE pitting edema  *SKIN: no rash or bruising   *NEUROLOGIC: alert, CN 2-12 intact, moves all 4 extremities, full active & passive ROM in all extremities, normal baseline gait  *PSYCH: insight and judgment nl, memory nl, affect nl, thought nl

## 2025-03-27 ENCOUNTER — APPOINTMENT (OUTPATIENT)
Dept: SURGERY | Facility: CLINIC | Age: 45
End: 2025-03-27
Payer: MEDICARE

## 2025-03-27 VITALS
SYSTOLIC BLOOD PRESSURE: 139 MMHG | OXYGEN SATURATION: 88 % | BODY MASS INDEX: 36.45 KG/M2 | WEIGHT: 315 LBS | HEART RATE: 73 BPM | TEMPERATURE: 98 F | DIASTOLIC BLOOD PRESSURE: 63 MMHG | HEIGHT: 78 IN

## 2025-03-27 VITALS — OXYGEN SATURATION: 90 %

## 2025-03-27 VITALS — OXYGEN SATURATION: 95 %

## 2025-03-27 DIAGNOSIS — E66.01 MORBID (SEVERE) OBESITY DUE TO EXCESS CALORIES: ICD-10-CM

## 2025-03-27 PROCEDURE — 99213 OFFICE O/P EST LOW 20 MIN: CPT

## 2025-04-03 NOTE — H&P ADULT - NSHPPHYSICALEXAM_GEN_ALL_CORE
"Goal Outcome Evaluation:      Plan of Care Reviewed With: patient          Outcome Evaluation: VSS on RA. Alert to self. Uses inappropriate language when addressing staff. Refuses all cares with me and vehemently requests I leave.      Problem: Adult Inpatient Plan of Care  Goal: Plan of Care Review  Description: The Plan of Care Review/Shift note should be completed every shift.  The Outcome Evaluation is a brief statement about your assessment that the patient is improving, declining, or no change.  This information will be displayed automatically on your shiftnote.  Outcome: Progressing  Flowsheets (Taken 4/3/2025 0131)  Outcome Evaluation: VSS on RA. Alert to self. Uses inappropriate language when addressing staff. Refuses all cares with me and vehemently requests I leave.  Plan of Care Reviewed With: patient  Goal: Patient-Specific Goal (Individualized)  Description: You can add care plan individualizations to a care plan. Examples of Individualization might be:  \"Parent requests to be called daily at 9am for status\", \"I have a hard time hearing out of my right ear\", or \"Do not touch me to wake me up as it startlesme\".  Outcome: Progressing  Goal: Absence of Hospital-Acquired Illness or Injury  Outcome: Progressing  Intervention: Identify and Manage Fall Risk  Recent Flowsheet Documentation  Taken 4/3/2025 0010 by Flavio Herrera, RN  Safety Promotion/Fall Prevention:   activity supervised   assistive device/personal items within reach   clutter free environment maintained   nonskid shoes/slippers when out of bed   safety round/check completed   room organization consistent  Intervention: Prevent Skin Injury  Recent Flowsheet Documentation  Taken 4/3/2025 0010 by Flavio Herrera, RN  Body Position: position changed independently  Intervention: Prevent Infection  Recent Flowsheet Documentation  Taken 4/3/2025 0010 by Flavio Herrera, RN  Infection Prevention:   rest/sleep promoted   single patient room " provided  Goal: Optimal Comfort and Wellbeing  Outcome: Progressing  Intervention: Monitor Pain and Promote Comfort  Recent Flowsheet Documentation  Taken 4/3/2025 0010 by Flavio Herrera, RN  Pain Management Interventions:   care clustered   quiet environment facilitated  Intervention: Provide Person-Centered Care  Recent Flowsheet Documentation  Taken 4/3/2025 0010 by Flavio Herrera, RN  Trust Relationship/Rapport:   care explained   choices provided   questions answered  Goal: Readiness for Transition of Care  Outcome: Progressing      T(C): 36.9 (06-08-20 @ 02:52)  T(F): 98.4 (06-08-20 @ 02:52), Max: 98.4 (06-08-20 @ 02:52)  HR: 113 (06-08-20 @ 02:52) (95 - 113)  BP: 185/93 (06-08-20 @ 02:52) (185/93 - 203/95)  RR:  (24 - 27)  SpO2:  (92% - 96%)  Wt(kg): --

## 2025-04-09 DIAGNOSIS — Z98.84 BARIATRIC SURGERY STATUS: ICD-10-CM

## 2025-04-09 RX ORDER — APIXABAN 2.5 MG/1
2.5 TABLET, FILM COATED ORAL
Qty: 60 | Refills: 0 | Status: ACTIVE | COMMUNITY
Start: 2025-04-09 | End: 1900-01-01

## 2025-04-09 RX ORDER — OMEPRAZOLE 40 MG/1
40 CAPSULE, DELAYED RELEASE ORAL
Qty: 30 | Refills: 2 | Status: ACTIVE | COMMUNITY
Start: 2025-04-09 | End: 1900-01-01

## 2025-04-09 RX ORDER — HYOSCYAMINE SULFATE 0.12 MG/1
0.12 TABLET SUBLINGUAL 3 TIMES DAILY
Qty: 21 | Refills: 0 | Status: ACTIVE | COMMUNITY
Start: 2025-04-09 | End: 1900-01-01

## 2025-04-09 RX ORDER — ONDANSETRON 4 MG/1
4 TABLET, ORALLY DISINTEGRATING ORAL 3 TIMES DAILY
Qty: 42 | Refills: 0 | Status: ACTIVE | COMMUNITY
Start: 2025-04-09 | End: 1900-01-01

## 2025-04-15 ENCOUNTER — APPOINTMENT (OUTPATIENT)
Dept: BARIATRICS | Facility: CLINIC | Age: 45
End: 2025-04-15

## 2025-04-15 ENCOUNTER — APPOINTMENT (OUTPATIENT)
Dept: SURGERY | Facility: CLINIC | Age: 45
End: 2025-04-15
Payer: MEDICARE

## 2025-04-15 VITALS
DIASTOLIC BLOOD PRESSURE: 75 MMHG | SYSTOLIC BLOOD PRESSURE: 132 MMHG | BODY MASS INDEX: 36.45 KG/M2 | WEIGHT: 315 LBS | HEART RATE: 63 BPM | HEIGHT: 78 IN

## 2025-04-15 DIAGNOSIS — E66.01 MORBID (SEVERE) OBESITY DUE TO EXCESS CALORIES: ICD-10-CM

## 2025-04-15 PROCEDURE — 99213 OFFICE O/P EST LOW 20 MIN: CPT

## 2025-04-16 ENCOUNTER — OUTPATIENT (OUTPATIENT)
Dept: OUTPATIENT SERVICES | Facility: HOSPITAL | Age: 45
LOS: 1 days | End: 2025-04-16
Payer: MEDICARE

## 2025-04-16 VITALS
RESPIRATION RATE: 17 BRPM | DIASTOLIC BLOOD PRESSURE: 66 MMHG | OXYGEN SATURATION: 96 % | HEART RATE: 68 BPM | WEIGHT: 315 LBS | SYSTOLIC BLOOD PRESSURE: 128 MMHG | HEIGHT: 78 IN | TEMPERATURE: 98 F

## 2025-04-16 DIAGNOSIS — Z91.89 OTHER SPECIFIED PERSONAL RISK FACTORS, NOT ELSEWHERE CLASSIFIED: ICD-10-CM

## 2025-04-16 DIAGNOSIS — Z98.890 OTHER SPECIFIED POSTPROCEDURAL STATES: Chronic | ICD-10-CM

## 2025-04-16 DIAGNOSIS — F20.9 SCHIZOPHRENIA, UNSPECIFIED: ICD-10-CM

## 2025-04-16 DIAGNOSIS — I10 ESSENTIAL (PRIMARY) HYPERTENSION: ICD-10-CM

## 2025-04-16 DIAGNOSIS — Z01.818 ENCOUNTER FOR OTHER PREPROCEDURAL EXAMINATION: ICD-10-CM

## 2025-04-16 DIAGNOSIS — E66.01 MORBID (SEVERE) OBESITY DUE TO EXCESS CALORIES: ICD-10-CM

## 2025-04-16 DIAGNOSIS — N18.6 END STAGE RENAL DISEASE: ICD-10-CM

## 2025-04-16 DIAGNOSIS — J44.89 OTHER SPECIFIED CHRONIC OBSTRUCTIVE PULMONARY DISEASE: ICD-10-CM

## 2025-04-16 LAB — BLD GP AB SCN SERPL QL: SIGNIFICANT CHANGE UP

## 2025-04-16 NOTE — H&P PST ADULT - PROBLEM SELECTOR PLAN 2
Patient on HD (M,W,F) at [facility]   Patient reports he scheduled to be dialyzed inpatient on day of surgery

## 2025-04-16 NOTE — H&P PST ADULT - RESPIRATORY RATE (BREATHS/MIN)
25yo  @40w2d, IOL for Post-EDC.    -Admit to L&D  -Admission labs  -IV Hydration  -Cont EFM/[place]  -Monitor vitals  -MMR needed postpartum    Dr. Sales and Dr. Florez to be made aware. Dr. Muñiz aware. [age]yo  @40w2d, IOL for Post-EDC.    -Admit to L&D  -Admission labs  -IV Hydration  -Cont EFM/[place]  -Monitor vitals  -MMR needed postpartum    Dr. Sales and Dr. Florez aware.

## 2025-04-16 NOTE — H&P PST ADULT - PROBLEM SELECTOR PLAN 1
Patient is scheduled for robotic assisted laparoscopic sleeve gastrectomy, possible open on 4/28/2025  Written and oral preoperative instructions given to patient with understanding verbalized.    Instructions given to include using 4% chlorhexidine wash as directed day of surgery  Maintaining NPO status post midnight day before surgery   Stopping aspirin, NSAIDs, herbs, vitamins 7days before surgery    Patient is to expect a phone call day before surgery between 430-630pm, giving arrival time for surgery day.     Type/Screen drawn today, results pending

## 2025-04-16 NOTE — H&P PST ADULT - NSICDXPASTSURGICALHX_GEN_ALL_CORE_FT
PAST SURGICAL HISTORY:  H/O hernia repair     History of cholecystectomy     S/P arteriovenous (AV) fistula creation right side

## 2025-04-16 NOTE — H&P PST ADULT - ASSESSMENT
44year old male with morbid obesity due to severe excess calories     CAPRINI SCORE    AGE RELATED RISK FACTORS                                                             [x] Age 41-60 years                                            (1 Point)  [ ] Age: 61-74 years                                           (2 Points)                 [ ] Age= 75 years                                                (3 Points)             DISEASE RELATED RISK FACTORS                                                       [x] Edema in the lower extremities                 (1 Point)                     [ ] Varicose veins                                               (1 Point)                                 [x] BMI > 25 Kg/m2                                            (1 Point)                                  [ ] Serious infection (ie PNA)                            (1 Point)                     [ ] Lung disease ( COPD, Emphysema)            (1 Point)                                                                          [ ] Acute myocardial infarction                         (1 Point)                  [ ] Congestive heart failure (in the previous month)  (1 Point)         [ ] Inflammatory bowel disease                            (1 Point)                  [ ] Central venous access, PICC or Port               (2 points)       (within the last month)                                                                [ ] Stroke (in the previous month)                        (5 Points)    [ ] Previous or present malignancy                       (2 points)                                                                                                                                                         HEMATOLOGY RELATED FACTORS                                                         [ ] Prior episodes of VTE                                     (3 Points)                     [ ] Positive family history for VTE                      (3 Points)                  [ ] Prothrombin 86123 A                                     (3 Points)                     [ ] Factor V Leiden                                                (3 Points)                        [ ] Lupus anticoagulants                                      (3 Points)                                                           [ ] Anticardiolipin antibodies                              (3 Points)                                                       [ ] High homocysteine in the blood                   (3 Points)                                             [ ] Other congenital or acquired thrombophilia      (3 Points)                                                [ ] Heparin induced thrombocytopenia                  (3 Points)                                        MOBILITY RELATED FACTORS  [ ] Bed rest                                                         (1 Point)  [ ] Plaster cast                                                    (2 points)  [ ] Bed bound for more than 72 hours           (2 Points)    GENDER SPECIFIC FACTORS  [ ] Pregnancy or had a baby within the last month   (1 Point)  [ ] Post-partum < 6 weeks                                   (1 Point)  [ ] Hormonal therapy  or oral contraception   (1 Point)  [ ] History of pregnancy complications              (1 point)  [ ] Unexplained or recurrent              (1 Point)    OTHER RISK FACTORS                                           (1 Point)  [x] BMI >40, smoking, diabetes requiring insulin, chemotherapy  blood transfusions and length of surgery over 2 hours    SURGERY RELATED RISK FACTORS  [ ]  Section within the last month     (1 Point)  [ ] Minor surgery                                                  (1 Point)  [ ] Arthroscopic surgery                                       (2 Points)  [x] Planned major surgery lasting more            (2 Points)      than 45 minutes     [ ] Elective hip or knee joint replacement       (5 points)       surgery                                                TRAUMA RELATED RISK FACTORS  [ ] Fracture of the hip, pelvis, or leg                       (5 Points)  [ ] Spinal cord injury resulting in paralysis             (5 points)       (in the previous month)    [ ] Paralysis  (less than 1 month)                             (5 Points)  [ ] Multiple Trauma within 1 month                        (5 Points)    Total Score [6]    Caprini Score 0-2: Low Risk, mechanical prophylaxis (ie:compression devices)  Caprini Score 3-6: Moderate Risk , pharmacologic VTE prophylaxis is indicated for most patients (in the absence of contraindications)  Caprini Score Greater than or =7: High risk, pharmocologic VTE prophylaxis indicated for most patients (in the absence of contraindications) 44year old male with morbid obesity due to severe excess calories     CAPRINI SCORE    AGE RELATED RISK FACTORS                                                             [x] Age 41-60 years                                            (1 Point)  [ ] Age: 61-74 years                                           (2 Points)                 [ ] Age= 75 years                                                (3 Points)             DISEASE RELATED RISK FACTORS                                                       [x] Edema in the lower extremities                 (1 Point)                     [ ] Varicose veins                                               (1 Point)                                 [x] BMI > 25 Kg/m2                                            (1 Point)                                  [ ] Serious infection (ie PNA)                            (1 Point)                     [ ] Lung disease ( COPD, Emphysema)            (1 Point)                                                                          [ ] Acute myocardial infarction                         (1 Point)                  [ ] Congestive heart failure (in the previous month)  (1 Point)         [ ] Inflammatory bowel disease                            (1 Point)                  [ ] Central venous access, PICC or Port               (2 points)       (within the last month)                                                                [ ] Stroke (in the previous month)                        (5 Points)    [ ] Previous or present malignancy                       (2 points)                                                                                                                                                         HEMATOLOGY RELATED FACTORS                                                         [ ] Prior episodes of VTE                                     (3 Points)                     [ ] Positive family history for VTE                      (3 Points)                  [ ] Prothrombin 65305 A                                     (3 Points)                     [ ] Factor V Leiden                                                (3 Points)                        [ ] Lupus anticoagulants                                      (3 Points)                                                           [ ] Anticardiolipin antibodies                              (3 Points)                                                       [ ] High homocysteine in the blood                   (3 Points)                                             [ ] Other congenital or acquired thrombophilia      (3 Points)                                                [ ] Heparin induced thrombocytopenia                  (3 Points)                                        MOBILITY RELATED FACTORS  [ ] Bed rest                                                         (1 Point)  [ ] Plaster cast                                                    (2 points)  [ ] Bed bound for more than 72 hours           (2 Points)    GENDER SPECIFIC FACTORS  [ ] Pregnancy or had a baby within the last month   (1 Point)  [ ] Post-partum < 6 weeks                                   (1 Point)  [ ] Hormonal therapy  or oral contraception   (1 Point)  [ ] History of pregnancy complications              (1 point)  [ ] Unexplained or recurrent              (1 Point)    OTHER RISK FACTORS                                           (1 Point)  [x] BMI >40, smoking, diabetes requiring insulin, chemotherapy  blood transfusions and length of surgery over 2 hours    SURGERY RELATED RISK FACTORS  [ ]  Section within the last month     (1 Point)  [ ] Minor surgery                                                  (1 Point)  [ ] Arthroscopic surgery                                       (2 Points)  [x] Planned major surgery lasting more            (2 Points)      than 45 minutes     [ ] Elective hip or knee joint replacement       (5 points)       surgery                                                TRAUMA RELATED RISK FACTORS  [ ] Fracture of the hip, pelvis, or leg                       (5 Points)  [ ] Spinal cord injury resulting in paralysis             (5 points)       (in the previous month)    [ ] Paralysis  (less than 1 month)                             (5 Points)  [ ] Multiple Trauma within 1 month                        (5 Points)    Total Score [6]

## 2025-04-16 NOTE — H&P PST ADULT - PROBLEM SELECTOR PLAN 6
Patient today with STOP bang score of 4, Intermediate risk for KRYSTIN   Patient denies current hx/dx of KRYSTIN/Sleep Study Test   Recommend maintaining perioperative KRYSTIN risk precautions.

## 2025-04-16 NOTE — H&P PST ADULT - PROBLEM SELECTOR PLAN 3
Patient advised with understanding verbalized to continue antihypertensives and to take meds with a sip of water the morning of surgery.   Follow up with PCP/Cardiologist postoperatively

## 2025-04-16 NOTE — H&P PST ADULT - PROBLEM SELECTOR PLAN 4
Patient advised with understanding verbalized to continue antipsychotics and to take meds with a sip of water the morning of surgery.   Follow up with Psychiatrist postoperatively

## 2025-04-16 NOTE — H&P PST ADULT - BIRTH SEX
If you are a smoker, it is important for your health to stop smoking. Please be aware that second hand smoke is also harmful.
Male

## 2025-04-16 NOTE — H&P PST ADULT - NSICDXPASTMEDICALHX_GEN_ALL_CORE_FT
PAST MEDICAL HISTORY:  Anxiety     Asthma with COPD     Bipolar disorder     COPD (chronic obstructive pulmonary disease)     COPD (chronic obstructive pulmonary disease)     Diabetes mellitus, type 2     DM (diabetes mellitus)     ESRD on dialysis     ESRD on dialysis     History of cholecystitis     HLD (hyperlipidemia)     HTN (hypertension)     HTN (hypertension)     Hypertension     Migraine     Morbid obesity with BMI of 40.0-44.9, adult     Renal failure     Schizophrenia     Schizophrenia      PAST MEDICAL HISTORY:  Anxiety     Asthma with COPD     Bipolar disorder     COPD (chronic obstructive pulmonary disease)     COPD (chronic obstructive pulmonary disease)     Diabetes mellitus, type 2     DM (diabetes mellitus)     ESRD on dialysis     ESRD on dialysis     History of cholecystitis     HLD (hyperlipidemia)     HTN (hypertension)     HTN (hypertension)     Hypertension     Migraine     Morbid obesity with BMI of 40.0-44.9, adult     Renal failure     Schizophrenia     Schizophrenia     Smokes tobacco daily

## 2025-04-16 NOTE — H&P PST ADULT - PROBLEM SELECTOR PLAN 7
Patient advised with understanding verbalized to continue use of inhalers and use the morning of surgery.   Follow up with Pulmonologist postoperatively

## 2025-04-16 NOTE — H&P PST ADULT - MS GEN HX ROS MEA POS PC
Pt ambulated to the bathroom with a steady gait.  Writer instructed the pt on how to provide a clean catch urine specimen.   Left knee/joint pain

## 2025-04-16 NOTE — H&P PST ADULT - HISTORY OF PRESENT ILLNESS
44year old male with pmhx of anxiety, bipolar disorder, schizophrenia, ESRD on HD, asthma with COPD, bipolar disorder, hypertension, hyperlipidemia, migraine and cholecystitis presents today for presurgical testing for scheduled Robotic Assisted Laparoscopic Sleeve Gastrectomy Possible open on 4/28/2025 44year oldmorbidly obese male with pmhx of anxiety, bipolar disorder, schizophrenia, ESRD on HD (M,W, F), asthma with COPD, hypertension, hyperlipidemia, migraine and cholecystitis presents with c/o failure to loose despite several dietary modalities, calorie reduction, portion control. Patient is here today for presurgical testing for scheduled Robotic Assisted Laparoscopic Sleeve Gastrectomy Possible open on 4/28/2025 44year old morbidly obese male with pmhx of anxiety, bipolar disorder, schizophrenia, ESRD on HD (M,W, F), asthma with COPD, hypertension, hyperlipidemia, migraine and cholecystitis presents with c/o failure to loose despite several dietary modalities, calorie reduction, portion control. Patient is here today for presurgical testing for scheduled Robotic Assisted Laparoscopic Sleeve Gastrectomy possible open on 4/28/2025

## 2025-04-17 PROCEDURE — 86850 RBC ANTIBODY SCREEN: CPT

## 2025-04-17 PROCEDURE — G0463: CPT

## 2025-04-17 PROCEDURE — 86900 BLOOD TYPING SEROLOGIC ABO: CPT

## 2025-04-17 PROCEDURE — 86901 BLOOD TYPING SEROLOGIC RH(D): CPT

## 2025-04-17 PROCEDURE — 36415 COLL VENOUS BLD VENIPUNCTURE: CPT

## 2025-04-24 ENCOUNTER — NON-APPOINTMENT (OUTPATIENT)
Age: 45
End: 2025-04-24

## 2025-05-05 ENCOUNTER — INPATIENT (INPATIENT)
Facility: HOSPITAL | Age: 45
LOS: 3 days | Discharge: HOME | DRG: 641 | End: 2025-05-09
Attending: SURGERY | Admitting: SURGERY
Payer: MEDICARE

## 2025-05-05 ENCOUNTER — APPOINTMENT (OUTPATIENT)
Dept: SURGERY | Facility: HOSPITAL | Age: 45
End: 2025-05-05

## 2025-05-05 ENCOUNTER — TRANSCRIPTION ENCOUNTER (OUTPATIENT)
Age: 45
End: 2025-05-05

## 2025-05-05 VITALS
RESPIRATION RATE: 16 BRPM | TEMPERATURE: 99 F | WEIGHT: 315 LBS | HEIGHT: 78 IN | OXYGEN SATURATION: 96 % | DIASTOLIC BLOOD PRESSURE: 71 MMHG | HEART RATE: 65 BPM | SYSTOLIC BLOOD PRESSURE: 122 MMHG

## 2025-05-05 DIAGNOSIS — E66.01 MORBID (SEVERE) OBESITY DUE TO EXCESS CALORIES: ICD-10-CM

## 2025-05-05 DIAGNOSIS — Z98.890 OTHER SPECIFIED POSTPROCEDURAL STATES: Chronic | ICD-10-CM

## 2025-05-05 LAB
ANION GAP SERPL CALC-SCNC: 18 MMOL/L — HIGH (ref 5–17)
BLD GP AB SCN SERPL QL: SIGNIFICANT CHANGE UP
BUN SERPL-MCNC: 155 MG/DL — HIGH (ref 7–18)
CALCIUM SERPL-MCNC: 9 MG/DL — SIGNIFICANT CHANGE UP (ref 8.4–10.5)
CHLORIDE SERPL-SCNC: 84 MMOL/L — LOW (ref 96–108)
CO2 SERPL-SCNC: 20 MMOL/L — LOW (ref 22–31)
CREAT SERPL-MCNC: 9.33 MG/DL — HIGH (ref 0.5–1.3)
EGFR: 7 ML/MIN/1.73M2 — LOW
EGFR: 7 ML/MIN/1.73M2 — LOW
GLUCOSE BLDC GLUCOMTR-MCNC: 153 MG/DL — HIGH (ref 70–99)
GLUCOSE SERPL-MCNC: 104 MG/DL — HIGH (ref 70–99)
POTASSIUM SERPL-MCNC: 5.3 MMOL/L — SIGNIFICANT CHANGE UP (ref 3.5–5.3)
POTASSIUM SERPL-SCNC: 5.3 MMOL/L — SIGNIFICANT CHANGE UP (ref 3.5–5.3)
SODIUM SERPL-SCNC: 122 MMOL/L — LOW (ref 135–145)

## 2025-05-05 PROCEDURE — 88307 TISSUE EXAM BY PATHOLOGIST: CPT | Mod: 26

## 2025-05-05 PROCEDURE — 43775 LAP SLEEVE GASTRECTOMY: CPT

## 2025-05-05 PROCEDURE — 43775 LAP SLEEVE GASTRECTOMY: CPT | Mod: AS

## 2025-05-05 DEVICE — XI STAPLER SUREFORM RELOAD 60 BLUE: Type: IMPLANTABLE DEVICE | Status: FUNCTIONAL

## 2025-05-05 DEVICE — XI STAPLER SUREFORM RELOAD 60 WHITE: Type: IMPLANTABLE DEVICE | Status: FUNCTIONAL

## 2025-05-05 RX ORDER — LABETALOL HYDROCHLORIDE 200 MG/1
10 TABLET, FILM COATED ORAL EVERY 12 HOURS
Refills: 0 | Status: DISCONTINUED | OUTPATIENT
Start: 2025-05-05 | End: 2025-05-09

## 2025-05-05 RX ORDER — HYDROMORPHONE/SOD CHLOR,ISO/PF 2 MG/10 ML
0.5 SYRINGE (ML) INJECTION EVERY 4 HOURS
Refills: 0 | Status: DISCONTINUED | OUTPATIENT
Start: 2025-05-05 | End: 2025-05-05

## 2025-05-05 RX ORDER — SCOPOLAMINE 1 MG/3D
1 PATCH, EXTENDED RELEASE TRANSDERMAL ONCE
Refills: 0 | Status: COMPLETED | OUTPATIENT
Start: 2025-05-05 | End: 2025-05-05

## 2025-05-05 RX ORDER — ONDANSETRON HCL/PF 4 MG/2 ML
4 VIAL (ML) INJECTION ONCE
Refills: 0 | Status: COMPLETED | OUTPATIENT
Start: 2025-05-05 | End: 2025-05-05

## 2025-05-05 RX ORDER — KETOROLAC TROMETHAMINE 30 MG/ML
15 INJECTION, SOLUTION INTRAMUSCULAR; INTRAVENOUS EVERY 6 HOURS
Refills: 0 | Status: DISCONTINUED | OUTPATIENT
Start: 2025-05-05 | End: 2025-05-07

## 2025-05-05 RX ORDER — ONDANSETRON HCL/PF 4 MG/2 ML
4 VIAL (ML) INJECTION
Refills: 0 | Status: DISCONTINUED | OUTPATIENT
Start: 2025-05-05 | End: 2025-05-09

## 2025-05-05 RX ORDER — DIATRIZOATE MEGLUMINE, SODIUM 66 %-10 %
30 VIAL (ML) INJECTION ONCE
Refills: 0 | Status: DISCONTINUED | OUTPATIENT
Start: 2025-05-05 | End: 2025-05-09

## 2025-05-05 RX ORDER — ENOXAPARIN SODIUM 100 MG/ML
40 INJECTION SUBCUTANEOUS ONCE
Refills: 0 | Status: COMPLETED | OUTPATIENT
Start: 2025-05-05 | End: 2025-05-05

## 2025-05-05 RX ORDER — ALBUTEROL SULFATE 2.5 MG/3ML
2 VIAL, NEBULIZER (ML) INHALATION EVERY 6 HOURS
Refills: 0 | Status: DISCONTINUED | OUTPATIENT
Start: 2025-05-05 | End: 2025-05-09

## 2025-05-05 RX ORDER — AZELASTINE HYDROCHLORIDE 137 UG/1
0 SPRAY, METERED NASAL
Refills: 0 | DISCHARGE

## 2025-05-05 RX ORDER — HYDROMORPHONE/SOD CHLOR,ISO/PF 2 MG/10 ML
0.5 SYRINGE (ML) INJECTION EVERY 4 HOURS
Refills: 0 | Status: DISCONTINUED | OUTPATIENT
Start: 2025-05-05 | End: 2025-05-06

## 2025-05-05 RX ORDER — ACETAMINOPHEN 500 MG/5ML
1000 LIQUID (ML) ORAL EVERY 8 HOURS
Refills: 0 | Status: COMPLETED | OUTPATIENT
Start: 2025-05-05 | End: 2025-05-06

## 2025-05-05 RX ORDER — METOCLOPRAMIDE HCL 10 MG
10 TABLET ORAL
Refills: 0 | Status: DISCONTINUED | OUTPATIENT
Start: 2025-05-05 | End: 2025-05-09

## 2025-05-05 RX ORDER — HYDROMORPHONE/SOD CHLOR,ISO/PF 2 MG/10 ML
0.5 SYRINGE (ML) INJECTION
Refills: 0 | Status: DISCONTINUED | OUTPATIENT
Start: 2025-05-05 | End: 2025-05-05

## 2025-05-05 RX ORDER — METOCLOPRAMIDE HCL 10 MG
10 TABLET ORAL
Refills: 0 | Status: DISCONTINUED | OUTPATIENT
Start: 2025-05-05 | End: 2025-05-05

## 2025-05-05 RX ORDER — HYDROMORPHONE/SOD CHLOR,ISO/PF 2 MG/10 ML
1 SYRINGE (ML) INJECTION EVERY 4 HOURS
Refills: 0 | Status: DISCONTINUED | OUTPATIENT
Start: 2025-05-05 | End: 2025-05-05

## 2025-05-05 RX ORDER — HEPARIN SODIUM 1000 [USP'U]/ML
7500 INJECTION INTRAVENOUS; SUBCUTANEOUS EVERY 8 HOURS
Refills: 0 | Status: DISCONTINUED | OUTPATIENT
Start: 2025-05-06 | End: 2025-05-09

## 2025-05-05 RX ORDER — HYDROMORPHONE/SOD CHLOR,ISO/PF 2 MG/10 ML
1 SYRINGE (ML) INJECTION
Refills: 0 | Status: DISCONTINUED | OUTPATIENT
Start: 2025-05-05 | End: 2025-05-05

## 2025-05-05 RX ORDER — SODIUM CHLORIDE 9 G/1000ML
1000 INJECTION, SOLUTION INTRAVENOUS
Refills: 0 | Status: DISCONTINUED | OUTPATIENT
Start: 2025-05-05 | End: 2025-05-05

## 2025-05-05 RX ADMIN — Medication 1 MILLIGRAM(S): at 14:20

## 2025-05-05 RX ADMIN — Medication 104 MILLIGRAM(S): at 14:11

## 2025-05-05 RX ADMIN — KETOROLAC TROMETHAMINE 15 MILLIGRAM(S): 30 INJECTION, SOLUTION INTRAMUSCULAR; INTRAVENOUS at 17:01

## 2025-05-05 RX ADMIN — Medication 3 MILLILITER(S): at 21:26

## 2025-05-05 RX ADMIN — Medication 25 MILLIGRAM(S): at 14:07

## 2025-05-05 RX ADMIN — Medication 0.5 MILLIGRAM(S): at 23:00

## 2025-05-05 RX ADMIN — KETOROLAC TROMETHAMINE 15 MILLIGRAM(S): 30 INJECTION, SOLUTION INTRAMUSCULAR; INTRAVENOUS at 16:01

## 2025-05-05 RX ADMIN — Medication 10 MILLIGRAM(S): at 23:31

## 2025-05-05 RX ADMIN — Medication 4 MILLIGRAM(S): at 21:20

## 2025-05-05 RX ADMIN — Medication 0.5 MILLIGRAM(S): at 22:33

## 2025-05-05 RX ADMIN — Medication 0.5 MILLIGRAM(S): at 17:00

## 2025-05-05 RX ADMIN — Medication 65 MILLILITER(S): at 19:06

## 2025-05-05 RX ADMIN — Medication 4 MILLIGRAM(S): at 13:20

## 2025-05-05 RX ADMIN — Medication 0.5 MILLIGRAM(S): at 16:00

## 2025-05-05 RX ADMIN — Medication 1 MILLIGRAM(S): at 19:32

## 2025-05-05 RX ADMIN — SCOPOLAMINE 1 PATCH: 1 PATCH, EXTENDED RELEASE TRANSDERMAL at 19:30

## 2025-05-05 RX ADMIN — Medication 0.5 MILLIGRAM(S): at 13:34

## 2025-05-05 RX ADMIN — Medication 3 MILLILITER(S): at 17:23

## 2025-05-05 RX ADMIN — Medication 400 MILLIGRAM(S): at 21:21

## 2025-05-05 RX ADMIN — Medication 0.5 MILLIGRAM(S): at 12:58

## 2025-05-05 RX ADMIN — SCOPOLAMINE 1 PATCH: 1 PATCH, EXTENDED RELEASE TRANSDERMAL at 08:12

## 2025-05-05 RX ADMIN — Medication 1 MILLIGRAM(S): at 14:15

## 2025-05-05 RX ADMIN — Medication 0.5 MILLIGRAM(S): at 15:03

## 2025-05-05 RX ADMIN — ENOXAPARIN SODIUM 40 MILLIGRAM(S): 100 INJECTION SUBCUTANEOUS at 08:12

## 2025-05-05 RX ADMIN — Medication 0.5 MILLIGRAM(S): at 13:29

## 2025-05-05 RX ADMIN — Medication 1 MILLIGRAM(S): at 18:32

## 2025-05-05 RX ADMIN — Medication 1000 MILLIGRAM(S): at 22:20

## 2025-05-05 NOTE — BRIEF OPERATIVE NOTE - ELECTIVE PROCEDURE
Medical Necessity Clause: This procedure was medically necessary because the lesions that were treated were: 44YO MALE WITH HIV DIAGNOSED IN OCTOBER AND WITH KAPOSI SARCOMA   S/P CHEMO HAS COMPLETED THIS   PT DEVELOPED REDNESS AND SWELLING IN RIGHT AND LEFT THIGH UNCLEAR IF POSSIBLE INSECT BITE  CLINICALLY IMPROVED ON IV ABX  CD4 70 NEEDS PCP PROPHYLAXIS  CT CHEST FINDINGS APPEAR CHRONIC IN NATURE  OVERALL IMPROVED   POSSIBLE  D/C ON ORAL ABX   WILL D/W HOSPITALIST Medical Necessity Information: It is in your best interest to select a reason for this procedure from the list below. All of these items fulfill various CMS LCD requirements except the new and changing color options. Show Topical Anesthesia Variable?: Yes Post-Care Instructions: I reviewed with the patient in detail post-care instructions. Patient is to wear sunprotection, and avoid picking at any of the treated lesions. Pt may apply Vaseline to crusted or scabbing areas. Render Post-Care Instructions In Note?: no Detail Level: Detailed Spray Paint Text: The liquid nitrogen was applied to the skin utilizing a spray paint frosting technique. Consent: The patient's consent was obtained including but not limited to risks of crusting, scabbing, blistering, scarring, darker or lighter pigmentary change, recurrence, incomplete removal and infection. Yes

## 2025-05-05 NOTE — CHART NOTE - NSCHARTNOTEFT_GEN_A_CORE
patient well known to the renal service   Has ESRD on HD MWF at [facility].  Last week had four days of dialysis including Saturday to optimize him for elective sleeve gasterectomy today  s/p surgery- currently in PACU. will plan for HD first shift in AM ( Tuesday) unless with pulmonary edema overnight  - fluid restriction 1.2 litres per day   full consult to follow.

## 2025-05-05 NOTE — PATIENT PROFILE ADULT - FUNCTIONAL ASSESSMENT - DAILY ACTIVITY 5.
4 = No assist / stand by assistance negative Affect and characteristics of appearance, verbalizations, behaviors are appropriate

## 2025-05-06 ENCOUNTER — TRANSCRIPTION ENCOUNTER (OUTPATIENT)
Age: 45
End: 2025-05-06

## 2025-05-06 LAB
ANION GAP SERPL CALC-SCNC: 16 MMOL/L — SIGNIFICANT CHANGE UP (ref 5–17)
BASOPHILS # BLD AUTO: 0 K/UL — SIGNIFICANT CHANGE UP (ref 0–0.2)
BASOPHILS NFR BLD AUTO: 0 % — SIGNIFICANT CHANGE UP (ref 0–2)
BUN SERPL-MCNC: 173 MG/DL — HIGH (ref 7–18)
CALCIUM SERPL-MCNC: 8.9 MG/DL — SIGNIFICANT CHANGE UP (ref 8.4–10.5)
CHLORIDE SERPL-SCNC: 84 MMOL/L — LOW (ref 96–108)
CO2 SERPL-SCNC: 20 MMOL/L — LOW (ref 22–31)
CREAT SERPL-MCNC: 11.1 MG/DL — HIGH (ref 0.5–1.3)
EGFR: 5 ML/MIN/1.73M2 — LOW
EGFR: 5 ML/MIN/1.73M2 — LOW
EOSINOPHIL # BLD AUTO: 0 K/UL — SIGNIFICANT CHANGE UP (ref 0–0.5)
EOSINOPHIL NFR BLD AUTO: 0 % — SIGNIFICANT CHANGE UP (ref 0–6)
GLUCOSE BLDC GLUCOMTR-MCNC: 112 MG/DL — HIGH (ref 70–99)
GLUCOSE BLDC GLUCOMTR-MCNC: 97 MG/DL — SIGNIFICANT CHANGE UP (ref 70–99)
GLUCOSE SERPL-MCNC: 92 MG/DL — SIGNIFICANT CHANGE UP (ref 70–99)
HCT VFR BLD CALC: 33.4 % — LOW (ref 39–50)
HGB BLD-MCNC: 11.4 G/DL — LOW (ref 13–17)
IMM GRANULOCYTES NFR BLD AUTO: 0.3 % — SIGNIFICANT CHANGE UP (ref 0–0.9)
LYMPHOCYTES # BLD AUTO: 0.79 K/UL — LOW (ref 1–3.3)
LYMPHOCYTES # BLD AUTO: 13.7 % — SIGNIFICANT CHANGE UP (ref 13–44)
MCHC RBC-ENTMCNC: 31.9 PG — SIGNIFICANT CHANGE UP (ref 27–34)
MCHC RBC-ENTMCNC: 34.1 G/DL — SIGNIFICANT CHANGE UP (ref 32–36)
MCV RBC AUTO: 93.6 FL — SIGNIFICANT CHANGE UP (ref 80–100)
MONOCYTES # BLD AUTO: 0.57 K/UL — SIGNIFICANT CHANGE UP (ref 0–0.9)
MONOCYTES NFR BLD AUTO: 9.9 % — SIGNIFICANT CHANGE UP (ref 2–14)
NEUTROPHILS # BLD AUTO: 4.38 K/UL — SIGNIFICANT CHANGE UP (ref 1.8–7.4)
NEUTROPHILS NFR BLD AUTO: 76.1 % — SIGNIFICANT CHANGE UP (ref 43–77)
NRBC BLD AUTO-RTO: 0 /100 WBCS — SIGNIFICANT CHANGE UP (ref 0–0)
PLATELET # BLD AUTO: 167 K/UL — SIGNIFICANT CHANGE UP (ref 150–400)
POTASSIUM SERPL-MCNC: 6.2 MMOL/L — CRITICAL HIGH (ref 3.5–5.3)
POTASSIUM SERPL-SCNC: 6.2 MMOL/L — CRITICAL HIGH (ref 3.5–5.3)
RBC # BLD: 3.57 M/UL — LOW (ref 4.2–5.8)
RBC # FLD: 13.8 % — SIGNIFICANT CHANGE UP (ref 10.3–14.5)
SODIUM SERPL-SCNC: 120 MMOL/L — CRITICAL LOW (ref 135–145)
WBC # BLD: 5.76 K/UL — SIGNIFICANT CHANGE UP (ref 3.8–10.5)
WBC # FLD AUTO: 5.76 K/UL — SIGNIFICANT CHANGE UP (ref 3.8–10.5)

## 2025-05-06 PROCEDURE — 74220 X-RAY XM ESOPHAGUS 1CNTRST: CPT | Mod: 26

## 2025-05-06 PROCEDURE — 36000 PLACE NEEDLE IN VEIN: CPT

## 2025-05-06 RX ORDER — HYDROMORPHONE/SOD CHLOR,ISO/PF 2 MG/10 ML
0.5 SYRINGE (ML) INJECTION
Refills: 0 | Status: DISCONTINUED | OUTPATIENT
Start: 2025-05-06 | End: 2025-05-07

## 2025-05-06 RX ORDER — HYDROMORPHONE/SOD CHLOR,ISO/PF 2 MG/10 ML
1 SYRINGE (ML) INJECTION
Refills: 0 | Status: DISCONTINUED | OUTPATIENT
Start: 2025-05-06 | End: 2025-05-09

## 2025-05-06 RX ORDER — ACETAMINOPHEN 500 MG/5ML
1000 LIQUID (ML) ORAL EVERY 6 HOURS
Refills: 0 | Status: DISCONTINUED | OUTPATIENT
Start: 2025-05-06 | End: 2025-05-07

## 2025-05-06 RX ORDER — EPOETIN ALFA 10000 [IU]/ML
4000 SOLUTION INTRAVENOUS; SUBCUTANEOUS
Refills: 0 | Status: DISCONTINUED | OUTPATIENT
Start: 2025-05-06 | End: 2025-05-09

## 2025-05-06 RX ORDER — LABETALOL HYDROCHLORIDE 200 MG/1
2 TABLET, FILM COATED ORAL
Qty: 0 | Refills: 0 | DISCHARGE
Start: 2025-05-06

## 2025-05-06 RX ADMIN — Medication 1000 MILLIGRAM(S): at 15:13

## 2025-05-06 RX ADMIN — Medication 1000 MILLIGRAM(S): at 06:20

## 2025-05-06 RX ADMIN — KETOROLAC TROMETHAMINE 15 MILLIGRAM(S): 30 INJECTION, SOLUTION INTRAMUSCULAR; INTRAVENOUS at 10:45

## 2025-05-06 RX ADMIN — Medication 1 MILLIGRAM(S): at 17:54

## 2025-05-06 RX ADMIN — Medication 400 MILLIGRAM(S): at 15:09

## 2025-05-06 RX ADMIN — Medication 10 MILLIGRAM(S): at 11:55

## 2025-05-06 RX ADMIN — Medication 0.5 MILLIGRAM(S): at 08:17

## 2025-05-06 RX ADMIN — Medication 0.5 MILLIGRAM(S): at 11:31

## 2025-05-06 RX ADMIN — Medication 0.5 MILLIGRAM(S): at 17:50

## 2025-05-06 RX ADMIN — Medication 0.5 MILLIGRAM(S): at 03:20

## 2025-05-06 RX ADMIN — Medication 0.5 MILLIGRAM(S): at 15:45

## 2025-05-06 RX ADMIN — KETOROLAC TROMETHAMINE 15 MILLIGRAM(S): 30 INJECTION, SOLUTION INTRAMUSCULAR; INTRAVENOUS at 10:15

## 2025-05-06 RX ADMIN — Medication 0.5 MILLIGRAM(S): at 02:43

## 2025-05-06 RX ADMIN — HEPARIN SODIUM 7500 UNIT(S): 1000 INJECTION INTRAVENOUS; SUBCUTANEOUS at 19:28

## 2025-05-06 RX ADMIN — Medication 0.5 MILLIGRAM(S): at 07:15

## 2025-05-06 RX ADMIN — SCOPOLAMINE 1 PATCH: 1 PATCH, EXTENDED RELEASE TRANSDERMAL at 19:30

## 2025-05-06 RX ADMIN — Medication 1 MILLIGRAM(S): at 22:31

## 2025-05-06 RX ADMIN — Medication 3 MILLILITER(S): at 22:26

## 2025-05-06 RX ADMIN — Medication 10 MILLIGRAM(S): at 23:25

## 2025-05-06 RX ADMIN — Medication 10 MILLIGRAM(S): at 05:26

## 2025-05-06 RX ADMIN — Medication 400 MILLIGRAM(S): at 05:25

## 2025-05-06 RX ADMIN — Medication 4 MILLIGRAM(S): at 15:09

## 2025-05-06 RX ADMIN — Medication 4 MILLIGRAM(S): at 02:42

## 2025-05-06 RX ADMIN — EPOETIN ALFA 4000 UNIT(S): 10000 SOLUTION INTRAVENOUS; SUBCUTANEOUS at 11:49

## 2025-05-06 RX ADMIN — Medication 3 MILLILITER(S): at 14:56

## 2025-05-06 RX ADMIN — Medication 4 MILLIGRAM(S): at 21:30

## 2025-05-06 RX ADMIN — Medication 3 MILLILITER(S): at 05:20

## 2025-05-06 RX ADMIN — Medication 1 MILLIGRAM(S): at 21:56

## 2025-05-06 RX ADMIN — SCOPOLAMINE 1 PATCH: 1 PATCH, EXTENDED RELEASE TRANSDERMAL at 06:33

## 2025-05-06 NOTE — DISCHARGE NOTE PROVIDER - HOSPITAL COURSE
44year old morbidly obese male with pmhx of anxiety, bipolar disorder, schizophrenia, ESRD on HD (M,W, F), asthma with COPD, hypertension, hyperlipidemia, migraine and cholecystitis who is s/p sleeve gastrectomy on 5/5/25. Pt was admitted to the hospital overnight for post-operative monitoring. UGI test done next morning without any contrast extravasation and pt was started on bariclears. On day of discharge, patient's pain was adequately controlled, tolerating bariclears, voiding freely, and ambulating at baseline. The patient was deemed medically stable for discharge home today. Instructions provided to follow up with Dr. Bueno in 1-2 weeks.    Dr. Luis Alberto Bueno  [facility]  Phone [phone] or [phone], Fax [phone]  BARIATRIC DISCHARGE INSTRUCTIONS    DIET AND NUTRITION:  • Day 1-2 after surgery: clear liquids- Broth, water, tea, sugar free ice pops.  • Day 1-2 through 2nd week: Protein shakes, milk, smooth yogurt, thin soups, sugar free pudding  • Your fluid intake goal is 64 oz. fluid per day. Sip fluid every 15 minutes.  • Your protein intake goal is  gm protein per day  • No carbonation! No caffeine! Do not drink from a straw.  ACTIVITY: WALK! You should be out of bed and walking as much as possible. Plan to walk every day for  at least 15-30 minutes. NO HEAVY LIFTING (10lbs or more) for four weeks. Continue to use incentive  spirometer you receive in the hospital.  WOUND CARE:  • OK to shower normally. Allow soap and water to run over the wounds. Do not scrub wounds. If  you have plastic bandages or Band-Aids on the wounds, remove them two days after surgery.  • Do not remove the tape strips that are directly on the skin.  • Do not soak in a bathtub, swimming pool, or hot tub for at least four weeks.    MEDICATION CHANGES:  • Prescriptions: You will get prescriptions for anti-acid (omeprazole), nausea medication  (Zofran) and pain medication (Tylenol) at your preop visit. If indicated, you may also be  prescribed blood thinning medication (Eliquis). Please  medications prior to surgery.  • If these prescriptions have not been sent to your pharmacy please let us know!  Crush or cut up your large pills! OK to take pills the size of a Tic-Tac or smaller unless extended release  • Extended –release medications can’t be crushed (i.e. XR, SR, CR) and need to be changed.  Discuss this with your PCP or surgeon before you leave the hospital.  • Antacid: you will be prescribed antacid medication daily for 30 days after surgery. This comes in  capsule form. If you had a gastric bypass, the capsule needs to be opened for you to take.  • Vitamins: start taking your vitamins as soon as you get home from surgery. These should be  chewable or dissolvable. Avoid the “gummy” vitamins.  • Diabetics: check your blood sugar regularly. If you have questions regarding your diabetic  medications, speak to you PCP or provider managing your diabetes.  • Meds to avoid: Do not take anti-inflammatory pain medications such as ibuprofen, Advil, Aleve,  or Naprosyn unless discussed with your surgeon. You CAN take Tylenol.    FOLLOW-UP APPOINTMENTS:  • Surgeon: within 7-10 days after surgery  • PCP: within 1-2 weeks after surgery to review and adjust your medications  Page 1 of 2  Bariatric Discharge Instructions ([facility]) Revised 12/2022    WARNING SIGNS  If something doesn’t feel right, call us!  If you need to go to the emergency room, return to the [facility] ER where you had surgery.  DEHYDRATION:  Symptoms of dehydration include dizziness when standing up, dry mouth, fatigue, and dark colored  urine. Your goal is at least 64oz of fluid per day. To reach this goal more easily, sip fluids [id number] every 15 minutes. If you become dehydrated and can’t catch up with your fluid intake, please call  your surgeon.  WARNING SIGNS to call for:  • Fever > 101 ° F  • Heart rate consistently > than 110 beats per minute  • Severe nausea or vomiting lasting more than 2 hours  • Worsening abdominal pain that is not relieved by pain medication  • Shortness of breath or chest pain  • Constipation (if no bowel movement by third day after surgery) see below  • Redness, drainage, or significant pain at the incision sites  CONSTIPATON:  • You may not have a bowel movement for up to 3-4 days after surgery. This is common.  • Most important cure for constipation is to drink plenty of liquid-at least 64 ounce per day  • Walking frequently can also help with constipation  • Avoid narcotics: Narcotic pain medication (oxycodone, hydrocodone, etc.) can worsen  constipation. If you need to take pain medication, take acetaminophen (Tylenol)  • Medications: If you have not had a bowel movement by day 3 after surgery, start Colace 100mg  three times daily. If you do not have a bowel movement in 4-5 days after surgery, add a laxative  such as Dulcolax/Miralax or a Dulcolax suppository. If these do not work, try Milk of Magnesia.  Follow instructions on bottle. This can be dehydrating, so be sure to maintain hydration as close  to goal as possible.  • If the above do not work, call your surgeon     44year old morbidly obese male with pmhx of anxiety, bipolar disorder, schizophrenia, ESRD on HD (M,W, F), asthma with COPD, hypertension, hyperlipidemia, migraine and cholecystitis who is s/p sleeve gastrectomy on 5/5/25. Pt was admitted to the hospital overnight for post-operative monitoring. UGI test done next morning without any contrast extravasation and pt was started on bariclears. Post operative course complicated by fever. Code sepsis called POD#3. CTAP negative for leak. Pt unable to get CT chest due to malfunctioning midline. VQ scan negative for PE. Pt's symptoms improved. On day of discharge, patient's pain was adequately controlled, tolerating bariclears, voiding freely, and ambulating at baseline. The patient was deemed medically stable for discharge home today. Instructions provided to follow up with Dr. Bueno in 1-2 weeks.    Dr. Luis Alberto Bueno  [facility]  Phone [phone] or [phone], Fax [phone]  BARIATRIC DISCHARGE INSTRUCTIONS    DIET AND NUTRITION:  • Day 1-2 after surgery: clear liquids- Broth, water, tea, sugar free ice pops.  • Day 1-2 through 2nd week: Protein shakes, milk, smooth yogurt, thin soups, sugar free pudding  • Your fluid intake goal is 64 oz. fluid per day. Sip fluid every 15 minutes.  • Your protein intake goal is  gm protein per day  • No carbonation! No caffeine! Do not drink from a straw.  ACTIVITY: WALK! You should be out of bed and walking as much as possible. Plan to walk every day for  at least 15-30 minutes. NO HEAVY LIFTING (10lbs or more) for four weeks. Continue to use incentive  spirometer you receive in the hospital.  WOUND CARE:  • OK to shower normally. Allow soap and water to run over the wounds. Do not scrub wounds. If  you have plastic bandages or Band-Aids on the wounds, remove them two days after surgery.  • Do not remove the tape strips that are directly on the skin.  • Do not soak in a bathtub, swimming pool, or hot tub for at least four weeks.    MEDICATION CHANGES:  • Prescriptions: You will get prescriptions for anti-acid (omeprazole), nausea medication  (Zofran) and pain medication (Tylenol) at your preop visit. If indicated, you may also be  prescribed blood thinning medication (Eliquis). Please  medications prior to surgery.  • If these prescriptions have not been sent to your pharmacy please let us know!  Crush or cut up your large pills! OK to take pills the size of a Tic-Tac or smaller unless extended release  • Extended –release medications can’t be crushed (i.e. XR, SR, CR) and need to be changed.  Discuss this with your PCP or surgeon before you leave the hospital.  • Antacid: you will be prescribed antacid medication daily for 30 days after surgery. This comes in  capsule form. If you had a gastric bypass, the capsule needs to be opened for you to take.  • Vitamins: start taking your vitamins as soon as you get home from surgery. These should be  chewable or dissolvable. Avoid the "gummy" vitamins.  • Diabetics: check your blood sugar regularly. If you have questions regarding your diabetic  medications, speak to you PCP or provider managing your diabetes.  • Meds to avoid: Do not take anti-inflammatory pain medications such as ibuprofen, Advil, Aleve,  or Naprosyn unless discussed with your surgeon. You CAN take Tylenol.    FOLLOW-UP APPOINTMENTS:  • Surgeon: within 7-10 days after surgery  • PCP: within 1-2 weeks after surgery to review and adjust your medications  Page 1 of 2  Bariatric Discharge Instructions ([facility]) Revised 12/2022    WARNING SIGNS  If something doesn’t feel right, call us!  If you need to go to the emergency room, return to the [facility] ER where you had surgery.  DEHYDRATION:  Symptoms of dehydration include dizziness when standing up, dry mouth, fatigue, and dark colored  urine. Your goal is at least 64oz of fluid per day. To reach this goal more easily, sip fluids [id number] every 15 minutes. If you become dehydrated and can’t catch up with your fluid intake, please call  your surgeon.  WARNING SIGNS to call for:  • Fever > 101 ° F  • Heart rate consistently > than 110 beats per minute  • Severe nausea or vomiting lasting more than 2 hours  • Worsening abdominal pain that is not relieved by pain medication  • Shortness of breath or chest pain  • Constipation (if no bowel movement by third day after surgery) see below  • Redness, drainage, or significant pain at the incision sites  CONSTIPATON:  • You may not have a bowel movement for up to 3-4 days after surgery. This is common.  • Most important cure for constipation is to drink plenty of liquid-at least 64 ounce per day  • Walking frequently can also help with constipation  • Avoid narcotics: Narcotic pain medication (oxycodone, hydrocodone, etc.) can worsen  constipation. If you need to take pain medication, take acetaminophen (Tylenol)  • Medications: If you have not had a bowel movement by day 3 after surgery, start Colace 100mg  three times daily. If you do not have a bowel movement in 4-5 days after surgery, add a laxative  such as Dulcolax/Miralax or a Dulcolax suppository. If these do not work, try Milk of Magnesia.  Follow instructions on bottle. This can be dehydrating, so be sure to maintain hydration as close  to goal as possible.  • If the above do not work, call your surgeon

## 2025-05-06 NOTE — CHART NOTE - NSCHARTNOTEFT_GEN_A_CORE
Pt seen for Bariatric surgery    Admit diagnosis: Morbid or severe obesity due to excess calories, BMI - 42.2. S/p sleeve gastrectomy on 5/5. Spoke with pt at bedside, no recent episodes of diarrhea or constipation per pt. No BM since surgery. Pt reported nausea and vomiting last night, nursing aware. Denies any chewing/swallowing difficulties. Pt is NPO at time of visit. Food preferences explored and forwarded to dietary. Pt with discharge instructions and Bariatric Surgery Guidelines. Reviewed diet protocols, emphasis on adequate protein/fluid intake. Discussed support groups, exercise, moving every two hours post-op, fluid tracking sheet, supplements, etc. MD to monitor, RD to remain available. eGFR - 5, Hyponatremia (122).    Diet Prescription: Diet, NPO (05-05-25 @ 12:59)    Pertinent Medications: MEDICATIONS  (STANDING):  acetaminophen   IVPB .. 1000 milliGRAM(s) IV Intermittent every 8 hours  diatrizoate meglumine/diatrizoate sodium. 30 milliLiter(s) Oral once  epoetin cyndie-epbx (RETACRIT) Injectable 4000 Unit(s) IV Push <User Schedule>  heparin   Injectable 7500 Unit(s) SubCutaneous every 8 hours  hydrALAZINE Injectable 25 milliGRAM(s) IV Push every 12 hours  labetalol Injectable 10 milliGRAM(s) IV Push every 12 hours  metoclopramide Injectable 10 milliGRAM(s) IV Push <User Schedule>  ondansetron Injectable 4 milliGRAM(s) IV Push <User Schedule>  pantoprazole  Injectable 40 milliGRAM(s) IV Push every 24 hours  sodium chloride 0.9% lock flush 3 milliLiter(s) IV Push every 8 hours  sodium chloride 0.9%. 1000 milliLiter(s) (65 mL/Hr) IV Continuous <Continuous>    MEDICATIONS  (PRN):  albuterol    90 MICROgram(s) HFA Inhaler 2 Puff(s) Inhalation every 6 hours PRN Shortness of Breath and/or Wheezing  HYDROmorphone  Injectable 0.5 milliGRAM(s) IV Push every 4 hours PRN Severe Pain (7 - 10)  ketorolac   Injectable 15 milliGRAM(s) IV Push every 6 hours PRN Moderate Pain (4 - 6)    Pertinent Labs: 05-06 Na120 mmol/L[LL] Glu 92 mg/dL K+ 6.2 mmol/L[HH] Cr  11.10 mg/dL[H]  mg/dL[H]     CAPILLARY BLOOD GLUCOSE      POCT Blood Glucose.: 112 mg/dL (06 May 2025 00:46)  POCT Blood Glucose.: 153 mg/dL (05 May 2025 13:14)      Weight: Weight (kg): 170.1 (05-05 @ 08:15)  Height: 79 in   IBW: 220 lbs +/-10%  BMI: 42.2 kg/m^2    Physical Assessment, per flowsheets:  Edema: No edema noted per RN flowsheets   Pressure Injury: No pressure injuries noted per RN flowsheets   Appearance: No overt signs of muscle wasting or fat loss     Estimated Needs:   [X] No change since previous assessment    Previous Nutrition Diagnosis: [X] Altered GI Function as related to gastric bypass as evidenced by BMI - 42.2   Nutrition Diagnosis is [x] ongoing   New Nutrition Diagnosis: [x] not applicable     Education:  [x] Provided     Interventions:   1) Continue current diet order as medically feasible.   2) Monitor PO intake, labs, weights, BM's, and skin integrity.    Ross Centeno RD (Available on teams)

## 2025-05-06 NOTE — DISCHARGE NOTE PROVIDER - DETAILS OF MALNUTRITION DIAGNOSIS/DIAGNOSES
This patient has been assessed with a concern for Malnutrition and was treated during this hospitalization for the following Nutrition diagnosis/diagnoses:     -  05/06/2025: Morbid obesity (BMI > 40)

## 2025-05-06 NOTE — DISCHARGE NOTE PROVIDER - NSDCCPCAREPLAN_GEN_ALL_CORE_FT
PRINCIPAL DISCHARGE DIAGNOSIS  Diagnosis: Morbid (severe) obesity due to excess calories  Assessment and Plan of Treatment: Community Status: Active     PRINCIPAL DISCHARGE DIAGNOSIS  Diagnosis: Morbid (severe) obesity due to excess calories  Assessment and Plan of Treatment: Community Status: Active      SECONDARY DISCHARGE DIAGNOSES  Diagnosis: ESRD (end stage renal disease)  Assessment and Plan of Treatment: Take Renvela powder as instructed instead of pills.  Dialysis as scheduled by nephrology.    Diagnosis: Hypertension  Assessment and Plan of Treatment: Resume medications.   Crush all tablets    Diagnosis: Asthma with COPD  Assessment and Plan of Treatment: Resume medications.   Crush all tablets

## 2025-05-06 NOTE — DIETITIAN NUTRITION RISK NOTIFICATION - COMMENTS
Please refer to initial dietitian note for comprehensive nutrition assessment.  Ross Centeno RD (Available on Teams)

## 2025-05-06 NOTE — DISCHARGE NOTE PROVIDER - CARE PROVIDER_API CALL
Luis Alberto Bueno [facility]  Surgery  [address] [place] [id number]  Phone: [phone]  Fax: [phone]  Follow Up Time:

## 2025-05-06 NOTE — DISCHARGE NOTE PROVIDER - NSDCCPTREATMENT_GEN_ALL_CORE_FT
PRINCIPAL PROCEDURE  Procedure: Laparoscopic sleeve gastrectomy  Findings and Treatment: Please follow-up with your surgeon in 1-2 weeks. Drink plenty of fluids and rest as needed. Call for any fever over 101, nausea, vomiting, severe pain, no passing of gas or bowel movement.   DIET  You may resume your regular diet as normal.   SURGICAL SITES  You may shower 48 hours post-operatively but do not bathe or soak in the water for 1-2 weeks; pat dry. If you notice any signs of surgical site infection (ie. redness, swelling, pain, pus drainage), please seek medical care immediately.  ACTIVITY  Do not lift anything heavier than 10 pounds for 2 weeks (2-3 months for hernia, no exercise for 2 weeks) and avoid strenuous activity for 4-6 weeks.   PAIN CONTROL  You may take Motrin 600mg (with food) or Tylenol 650mg as needed for mild pain. Stagger one medication 3 hours after the other for maximum pain control. Maximum daily dose of Tylenol should not exceed 4grams/day.           PRINCIPAL PROCEDURE  Procedure: Laparoscopic sleeve gastrectomy  Findings and Treatment: Please follow-up with your surgeon in 1-2 weeks. Drink plenty of fluids and rest as needed. Call for any fever over 101, nausea, vomiting, severe pain, no passing of gas or bowel movement.   DIET  You may resume your regular diet as normal.   SURGICAL SITES  You may shower 48 hours post-operatively but do not bathe or soak in the water for 1-2 weeks; pat dry. If you notice any signs of surgical site infection (ie. redness, swelling, pain, pus drainage), please seek medical care immediately.  ACTIVITY  Do not lift anything heavier than 10 pounds for 2 weeks (2-3 months for hernia, no exercise for 2 weeks) and avoid strenuous activity for 4-6 weeks.   PAIN CONTROL  You may take Motrin 600mg (with food) or Tylenol 650mg as for mild pain.   Maximum daily dose of Tylenol should not exceed 4grams/day.

## 2025-05-06 NOTE — DISCHARGE NOTE PROVIDER - NSDCFUSCHEDAPPT_GEN_ALL_CORE_FT
Luis Alberto Bueno  [facility] [address] [facility]  Scheduled Appointment: 05/13/2025    Luis Alberto Bueno  [facility] [address] [facility]  Scheduled Appointment: 06/10/2025

## 2025-05-06 NOTE — DISCHARGE NOTE PROVIDER - NSDCMRMEDTOKEN_GEN_ALL_CORE_FT
Albuterol (Eqv-ProAir HFA) 90 mcg/inh inhalation aerosol: 2 puff(s) inhaled every 6 hours as needed for  shortness of breath and/or wheezing  AMLODIPINE 5MG TAB: 1 tab(s) orally once a day  ascorbic acid: 500 milligram(s) orally once a day  ATORVASTATIN 40MG TAB: 1 tab(s) orally once a day  azelastine nasal:   budesonide/glycopyrrolate/formoterol 160 mcg-9 mcg-4.8 mcg/inh inhalation aerosol: 2 puff(s) inhaled 2 times a day  Calcitriol Oral Capsule 0.5 MC tab(s) orally once a day  CARVEDILOL 25MG TAB: 1 tab(s) orally 2 times a day  ESCITALOPRAM 10MG TAB: 1 tab(s) orally once a day  famotidine 20 mg oral tablet: 1 tab(s) orally once a day  fluPHENAZine 5 mg oral tablet: 1 tab(s) orally once a day  folic acid: 1 milligram(s) orally once a day  Haldol 2 mg oral tablet: 3 tab(s) orally once a day (at bedtime)  hydrALAZINE 50 mg oral tablet: 1 tab(s) orally 3 times a day  montelukast 10 mg oral tablet: 1 tab(s) orally once a day  PARoxetine 20 mg oral tablet: 1 tab(s) orally once a day (in the morning)  Protonix 40 mg oral delayed release tablet: 1 tab(s) orally once a day  QUEtiapine 200 mg oral tablet: 2 tab(s) orally once a day (at bedtime)  risperiDONE 2 mg oral tablet: 1 tab(s) orally 2 times a day  senna (sennosides) 8.6 mg oral tablet: 2 tab(s) orally once a day (at bedtime)  SEVELAMER CARB  M each orally 3 times a day   Albuterol (Eqv-ProAir HFA) 90 mcg/inh inhalation aerosol: 2 puff(s) inhaled every 6 hours as needed for  shortness of breath and/or wheezing  AMLODIPINE 5MG TAB: 1 tab(s) orally once a day Crush tablet. Mix with liquid, or applesauce when full liquid allowed  azelastine nasal:   budesonide/glycopyrrolate/formoterol 160 mcg-9 mcg-4.8 mcg/inh inhalation aerosol: 2 puff(s) inhaled 2 times a day  Calcitriol Oral Capsule 0.5 MC tab(s) orally once a day  CARVEDILOL 25MG TAB: 1 tab(s) orally 2 times a day Crush tablet. Mix with liquid, or applesauce when full liquid allowed  ESCITALOPRAM 10MG TAB: 1 tab(s) orally once a day Crush tablet. Mix with liquid, or applesauce when full liquid allowed  famotidine 20 mg oral tablet: 1 tab(s) orally once a day  fluPHENAZine 5 mg oral tablet: 1 tab(s) orally once a day Crush tablet. Mix with liquid, or applesauce when full liquid allowed  Haldol 2 mg oral tablet: 3 tab(s) orally once a day (at bedtime) Crush tablet. Mix with liquid, or applesauce when full liquid allowed  hydrALAZINE 50 mg oral tablet: 1 tab(s) orally 3 times a day Crush tablet. Mix with liquid, or applesauce when full liquid allowed  montelukast 10 mg oral tablet: 1 tab(s) orally once a day Crush tablet. Mix with liquid, or applesauce when full liquid allowed  PARoxetine 20 mg oral tablet: 1 tab(s) orally once a day (in the morning) Crush tablet. Mix with liquid, or applesauce when full liquid allowed  QUEtiapine 200 mg oral tablet: 2 tab(s) orally once a day (at bedtime) Crush tablet. Mix with liquid, or applesauce when full liquid allowed  risperiDONE 2 mg oral tablet: 1 tab(s) orally 2 times a day Crush tablet. Mix with liquid, or applesauce when full liquid allowed  senna (sennosides) 8.6 mg oral tablet: 2 tab(s) orally once a day (at bedtime)  SEVELAMER CARB  M each orally 3 times a day   Albuterol (Eqv-ProAir HFA) 90 mcg/inh inhalation aerosol: 2 puff(s) inhaled every 6 hours as needed for  shortness of breath and/or wheezing  AMLODIPINE 5MG TAB: 1 tab(s) orally once a day Crush tablet. Mix with liquid, or applesauce when full liquid allowed  atorvastatin: 40 milligram(s) orally once a day (at bedtime) Resume medications.   Crush all tablets  azelastine nasal:   budesonide/glycopyrrolate/formoterol 160 mcg-9 mcg-4.8 mcg/inh inhalation aerosol: 2 puff(s) inhaled 2 times a day  CARVEDILOL 25MG TAB: 1 tab(s) orally 2 times a day Crush tablet. Mix with liquid, or applesauce when full liquid allowed  ESCITALOPRAM 10MG TAB: 1 tab(s) orally once a day Crush tablet. Mix with liquid, or applesauce when full liquid allowed  fluPHENAZine 5 mg oral tablet: 1 tab(s) orally once a day Crush tablet. Mix with liquid, or applesauce when full liquid allowed  Haldol 2 mg oral tablet: 3 tab(s) orally once a day (at bedtime) Crush tablet. Mix with liquid, or applesauce when full liquid allowed  hydrALAZINE 50 mg oral tablet: 1 tab(s) orally 3 times a day Crush tablet. Mix with liquid, or applesauce when full liquid allowed  montelukast 10 mg oral tablet: 1 tab(s) orally once a day Crush tablet. Mix with liquid, or applesauce when full liquid allowed  PARoxetine 20 mg oral tablet: 1 tab(s) orally once a day (in the morning) Crush tablet. Mix with liquid, or applesauce when full liquid allowed  QUEtiapine 200 mg oral tablet: 2 tab(s) orally once a day (at bedtime) Crush tablet. Mix with liquid, or applesauce when full liquid allowed  Renvela 0.8 g oral powder for reconstitution: 1 packet(s) orally 3 times a day  risperiDONE 2 mg oral tablet: 1 tab(s) orally 2 times a day Crush tablet. Mix with liquid, or applesauce when full liquid allowed  senna (sennosides) 8.6 mg oral tablet: 2 tab(s) orally once a day (at bedtime)

## 2025-05-06 NOTE — CONSULT NOTE ADULT - SUBJECTIVE AND OBJECTIVE BOX
[facility] : Progress Note :: [phone], (office [phone]),   Dr La / Dr Hui / Dr Barber / Dr Villalobos / Dr Hang CASTELLANO / Dr Mehta / Dr Sanchez / Dr Alber dumont  _____________________________________________________________________________________________  Patient is a 45y Male whom presented to the hospital for elective sleeve gastrectomy. He has H/O ESRD on HD at [facility]. Last week had extra on Saturday to optimize for surgery.  s/p sleeve gastrectomy. Labs noted for hyperkalemia and hyponatremia. s/p HD this morning. Complains surgical site pain.    PAST MEDICAL & SURGICAL HISTORY:  Morbid obesity with BMI of 40.0-44.9, adult      ESRD on dialysis      Bipolar disorder      DM (diabetes mellitus)      HTN (hypertension)      Migraine      COPD (chronic obstructive pulmonary disease)      Renal failure      Asthma with COPD      Diabetes mellitus, type 2      Hypertension      Schizophrenia      Anxiety      Schizophrenia      COPD (chronic obstructive pulmonary disease)      HTN (hypertension)      ESRD on dialysis      HLD (hyperlipidemia)      History of cholecystitis      Smokes tobacco daily      H/O hernia repair      S/P arteriovenous (AV) fistula creation  right side      History of cholecystectomy        shellfish (Rash)  [place] (Anaphylaxis)  penicillins (Swelling)  penicillin (Anaphylaxis)  fish (Unknown)  shellfish (Anaphylaxis)  aspirin (Swelling)  Seafood (Hives)    Home Medications Reviewed  Hospital Medications:   MEDICATIONS  (STANDING):  diatrizoate meglumine/diatrizoate sodium. 30 milliLiter(s) Oral once  epoetin cyndie-epbx (RETACRIT) Injectable 4000 Unit(s) IV Push <User Schedule>  heparin   Injectable 7500 Unit(s) SubCutaneous every 8 hours  hydrALAZINE Injectable 25 milliGRAM(s) IV Push every 12 hours  labetalol Injectable 10 milliGRAM(s) IV Push every 12 hours  metoclopramide Injectable 10 milliGRAM(s) IV Push <User Schedule>  ondansetron Injectable 4 milliGRAM(s) IV Push <User Schedule>  pantoprazole  Injectable 40 milliGRAM(s) IV Push every 24 hours  sodium chloride 0.9% lock flush 3 milliLiter(s) IV Push every 8 hours  sodium chloride 0.9%. 1000 milliLiter(s) (65 mL/Hr) IV Continuous <Continuous>    SOCIAL HISTORY:  Denies ETOh,Smoking,   FAMILY HISTORY:  FH: lymphoma (Sibling)    FH: rheumatoid arthritis (Sibling)          VITALS:  T(F): 97.2 (05-06-25 @ 14:16), Max: 97.5 (05-06-25 @ 05:18)  HR: 65 (05-06-25 @ 17:18)  BP: 145/68 (05-06-25 @ 17:18)  RR: 18 (05-06-25 @ 14:16)  SpO2: 97% (05-06-25 @ 16:08)  Wt(kg): --    05-05 @ 07:01  -  05-06 @ 07:00  --------------------------------------------------------  IN: 845 mL / OUT: 0 mL / NET: 845 mL        PHYSICAL EXAM:  Constitutional: NAD  HEENT: anicteric sclera, oropharynx clear,  Neck: No JVD  Respiratory: CTAB, no wheezes, rales or rhonchi  Cardiovascular: S1, S2, RRR  Gastrointestinal: BS+, soft, NT/ND  Extremities:  No peripheral edema  Neurological: A/O x 3, no focal deficits  Vascular Access: AVF with thrill and bruit     LABS:  05-06    120[LL]  |  84[L]  |  173[H]  ----------------------------<  92  6.2[HH]   |  20[L]  |  11.10[H]    Ca    8.9      06 May 2025 05:40      Creatinine Trend: 11.10 <--, 9.33 <--                        11.4   5.76  )-----------( 167      ( 06 May 2025 05:40 )             33.4     Urine Studies:  Urinalysis Basic - ( 06 May 2025 05:40 )    Color:  / Appearance:  / SG:  / pH:   Gluc: 92 mg/dL / Ketone:   / Bili:  / Urobili:    Blood:  / Protein:  / Nitrite:    Leuk Esterase:  / RBC:  / WBC    Sq Epi:  / Non Sq Epi:  / Bacteria:         RADIOLOGY & ADDITIONAL STUDIES:

## 2025-05-07 DIAGNOSIS — G89.18 OTHER ACUTE POSTPROCEDURAL PAIN: ICD-10-CM

## 2025-05-07 DIAGNOSIS — Z98.84 BARIATRIC SURGERY STATUS: ICD-10-CM

## 2025-05-07 LAB
ANION GAP SERPL CALC-SCNC: 11 MMOL/L — SIGNIFICANT CHANGE UP (ref 5–17)
BUN SERPL-MCNC: 105 MG/DL — HIGH (ref 7–18)
CALCIUM SERPL-MCNC: 9 MG/DL — SIGNIFICANT CHANGE UP (ref 8.4–10.5)
CHLORIDE SERPL-SCNC: 88 MMOL/L — LOW (ref 96–108)
CO2 SERPL-SCNC: 25 MMOL/L — SIGNIFICANT CHANGE UP (ref 22–31)
CREAT SERPL-MCNC: 8.97 MG/DL — HIGH (ref 0.5–1.3)
EGFR: 7 ML/MIN/1.73M2 — LOW
EGFR: 7 ML/MIN/1.73M2 — LOW
GLUCOSE SERPL-MCNC: 92 MG/DL — SIGNIFICANT CHANGE UP (ref 70–99)
HBV SURFACE AB SER-ACNC: REACTIVE — SIGNIFICANT CHANGE UP
HBV SURFACE AG SER-ACNC: SIGNIFICANT CHANGE UP
HCT VFR BLD CALC: 28.9 % — LOW (ref 39–50)
HGB BLD-MCNC: 10 G/DL — LOW (ref 13–17)
MAGNESIUM SERPL-MCNC: 2.1 MG/DL — SIGNIFICANT CHANGE UP (ref 1.6–2.6)
MCHC RBC-ENTMCNC: 32.4 PG — SIGNIFICANT CHANGE UP (ref 27–34)
MCHC RBC-ENTMCNC: 34.6 G/DL — SIGNIFICANT CHANGE UP (ref 32–36)
MCV RBC AUTO: 93.5 FL — SIGNIFICANT CHANGE UP (ref 80–100)
NRBC BLD AUTO-RTO: 0 /100 WBCS — SIGNIFICANT CHANGE UP (ref 0–0)
PHOSPHATE SERPL-MCNC: 5.9 MG/DL — HIGH (ref 2.5–4.5)
PLATELET # BLD AUTO: 127 K/UL — LOW (ref 150–400)
POTASSIUM SERPL-MCNC: 5.2 MMOL/L — SIGNIFICANT CHANGE UP (ref 3.5–5.3)
POTASSIUM SERPL-SCNC: 5.2 MMOL/L — SIGNIFICANT CHANGE UP (ref 3.5–5.3)
RBC # BLD: 3.09 M/UL — LOW (ref 4.2–5.8)
RBC # FLD: 13.8 % — SIGNIFICANT CHANGE UP (ref 10.3–14.5)
SODIUM SERPL-SCNC: 124 MMOL/L — LOW (ref 135–145)
WBC # BLD: 6.63 K/UL — SIGNIFICANT CHANGE UP (ref 3.8–10.5)
WBC # FLD AUTO: 6.63 K/UL — SIGNIFICANT CHANGE UP (ref 3.8–10.5)

## 2025-05-07 PROCEDURE — 99222 1ST HOSP IP/OBS MODERATE 55: CPT | Mod: FS

## 2025-05-07 RX ORDER — ACETAMINOPHEN 500 MG/5ML
1000 LIQUID (ML) ORAL ONCE
Refills: 0 | Status: COMPLETED | OUTPATIENT
Start: 2025-05-07 | End: 2025-05-07

## 2025-05-07 RX ADMIN — Medication 3 MILLILITER(S): at 14:28

## 2025-05-07 RX ADMIN — Medication 4 MILLIGRAM(S): at 14:55

## 2025-05-07 RX ADMIN — Medication 1 MILLIGRAM(S): at 10:22

## 2025-05-07 RX ADMIN — Medication 1 MILLIGRAM(S): at 05:17

## 2025-05-07 RX ADMIN — Medication 3 MILLILITER(S): at 05:52

## 2025-05-07 RX ADMIN — Medication 1 MILLIGRAM(S): at 14:55

## 2025-05-07 RX ADMIN — SCOPOLAMINE 1 PATCH: 1 PATCH, EXTENDED RELEASE TRANSDERMAL at 07:27

## 2025-05-07 RX ADMIN — Medication 1 MILLIGRAM(S): at 22:55

## 2025-05-07 RX ADMIN — Medication 4 MILLIGRAM(S): at 02:45

## 2025-05-07 RX ADMIN — LABETALOL HYDROCHLORIDE 10 MILLIGRAM(S): 200 TABLET, FILM COATED ORAL at 18:11

## 2025-05-07 RX ADMIN — Medication 1 MILLIGRAM(S): at 06:10

## 2025-05-07 RX ADMIN — HEPARIN SODIUM 7500 UNIT(S): 1000 INJECTION INTRAVENOUS; SUBCUTANEOUS at 19:40

## 2025-05-07 RX ADMIN — HEPARIN SODIUM 7500 UNIT(S): 1000 INJECTION INTRAVENOUS; SUBCUTANEOUS at 02:45

## 2025-05-07 RX ADMIN — Medication 10 MILLIGRAM(S): at 22:55

## 2025-05-07 RX ADMIN — Medication 1 MILLIGRAM(S): at 02:00

## 2025-05-07 RX ADMIN — Medication 1 MILLIGRAM(S): at 22:09

## 2025-05-07 RX ADMIN — LABETALOL HYDROCHLORIDE 10 MILLIGRAM(S): 200 TABLET, FILM COATED ORAL at 05:28

## 2025-05-07 RX ADMIN — Medication 3 MILLILITER(S): at 21:00

## 2025-05-07 RX ADMIN — Medication 1 MILLIGRAM(S): at 17:53

## 2025-05-07 RX ADMIN — SCOPOLAMINE 1 PATCH: 1 PATCH, EXTENDED RELEASE TRANSDERMAL at 19:04

## 2025-05-07 RX ADMIN — Medication 1 MILLIGRAM(S): at 11:26

## 2025-05-07 RX ADMIN — Medication 10 MILLIGRAM(S): at 17:52

## 2025-05-07 RX ADMIN — Medication 4 MILLIGRAM(S): at 20:06

## 2025-05-07 RX ADMIN — Medication 10 MILLIGRAM(S): at 05:17

## 2025-05-07 RX ADMIN — Medication 400 MILLIGRAM(S): at 23:53

## 2025-05-07 RX ADMIN — Medication 25 MILLIGRAM(S): at 18:05

## 2025-05-07 RX ADMIN — Medication 1 MILLIGRAM(S): at 01:02

## 2025-05-07 RX ADMIN — Medication 1 MILLIGRAM(S): at 15:55

## 2025-05-07 NOTE — CONSULT NOTE ADULT - ASSESSMENT
Confidential Drug Utilization Report  Search Terms: Ayana Mace, 1980Search Date: 05/07/2025 09:11:49 AM  The Drug Utilization Report below displays all of the controlled substance prescriptions, if any, that your patient has filled in the last twelve months. The information displayed on this report is compiled from pharmacy submissions to the Department, and accurately reflects the information as submitted by the pharmacies.    This report was requested by: Damaris Cabral | Reference #: [id number]    You have not added a MURIEL number. Keeping your MURIEL number(s) up to date on the My MURIEL # tab will enable the separation of your prescriptions from others in the search results.    Practitioner Count: 0  Pharmacy Count: 0  Current Opioid Prescriptions: 0  Current Benzodiazepine Prescriptions: 0  Current Stimulant Prescriptions: 0      Patient Demographic Information (PDI)       PDI	First Name	Last Name	Birth Date	Gender	Street Address	[facility]	Zip Code  A	Ayana Mace	1980	Male	[address]	[facility]	[address]    Prescription Information      PDI Filter:    PDI	Current Rx	Drug Type	Rx Written	Rx Dispensed	Drug	Quantity	Days Supply  A	N	O	09/08/2024	09/10/2024	endocet 5-325 mg tablet	10	2  Prescriber Name Noah Hogue MD  Prescriber MURIEL # [id number]  Payment Method Insurance  Dispenser [facility]    * - Details of Drug Type : O = Opioid, B = Benzodiazepine, S = Stimulant    * - Drugs marked with an asterisk are compound drugs. If the compound drug is made up of more than one controlled substance, then each controlled substance will be a separate row in the table.
Patient is a 45y Male whom presented to the hospital for elective sleeve gastrectomy. He has H/O ESRD on HD at [facility]. Last week had extra on Saturday to optimize for surgery.  s/p sleeve gastrectomy. Labs noted for hyperkalemia and hyponatremia. s/p HD this morning. Complains surgical site pain.    # ESRD s/p HD this morning. renal diet. fluid restriction 1.2L /day. daily BMP  dose meds ofr ESRD  # hyperkalemia- s/p HD against a low K+ bath  # hyponatremia in ESRD, fluid restriction 1.2l/day  # anemia of CKD. HGB at goal- monitor off SIOBHAN  # renal osteodystrophy- holding binders post surgery    thanks for the consult

## 2025-05-07 NOTE — PHYSICAL THERAPY INITIAL EVALUATION ADULT - ADDITIONAL COMMENTS
Pt lives in a private apt with no TITO. Pt states that he is able to ambulate without any assistive devices but will occasionally use them if he has to travel longer distances. Pt also uses home O2 set at 4L. Pt states that his mother assists him with ADLs.

## 2025-05-07 NOTE — CONSULT NOTE ADULT - PROBLEM SELECTOR RECOMMENDATION 9
Pt with acute post-operative abdominal pain which is somatic and neuropathic in nature due to surgical procedure. Pt is s/p Robotic Assisted Laparoscopic Sleeve Gastrectomy on 5/5. POD#2.   Opioid pain recommendations   - Continue Dilaudid 1 mg IVP q3h PRN for severe pain. Monitor for oversedation and respiratory distress. If pain remains refractory, consider titrating up 2 mg IV q3h PRN. Once, pt able to tolerate pills/diet per primary team.   -Transition to Dilaudid 2 mg po q4h PRN for severe pain.    Non-opioid pain recommendations   - Discontinue Toradol IV. Pt with +MARYLU, ESRD  - Discontinue Acetaminophen 1 gram IV q 8 hours PRN. PT refusing.  - Consider Lidocaine patch 4% x 2 daily  Bowel Regimen  - Per Primary team  Mild pain   - Non-pharmacological pain treatment recommendations  - Warm/ Cool packs PRN   - Repositioning extremity, elevation, imagery, relaxation, distraction.  - Physical therapy OOB if no contraindications   Recommendations discussed with primary team and RN.

## 2025-05-07 NOTE — PHYSICAL THERAPY INITIAL EVALUATION ADULT - GENERAL OBSERVATIONS, REHAB EVAL
Consult received, chart reviewed. Patient received supine in bed, NAD, + 3L O2 via NC, +Tele. Patient agreed to EVALUATION from Physical Therapist.

## 2025-05-07 NOTE — CONSULT NOTE ADULT - SUBJECTIVE AND OBJECTIVE BOX
Source of information: PRESTON JOHNSON, Chart review  Patient language: English  : n/a    HPI:  44year old morbidly obese male with pmhx of anxiety, bipolar disorder, schizophrenia, ESRD on HD (M,W, F), asthma with COPD, hypertension, hyperlipidemia, migraine and cholecystitis presents with c/o failure to loose despite several dietary modalities, calorie reduction, portion control. Patient is here today for presurgical testing for scheduled Robotic Assisted Laparoscopic Sleeve Gastrectomy possible open on 4/28/2025 (16 Apr 2025 16:41)      Patient is a 45y old  Male who presents with a chief complaint of sleeve gastrectomy (06 May 2025 18:09)  . Pt is admitted for ..., being treated with .... Pain consulted for .... Pt seen and examined at bedside. Reports pain score ***  SCALE USED: (1-10 VNRS). Pt describes pain as .... radiating to... alleviated by pain medication... exacerbated by movement... Pt tolerating PO diet. Denies lethargy, nausea, vomiting, constipation, itchiness. Reports last BM ***. Patient stated goal for pain control: to be able to take deep breaths, get out of bed to chair and ambulate with tolerable pain control. Pt denies taking medications for pain at home.     PAST MEDICAL & SURGICAL HISTORY:  Morbid obesity with BMI of 40.0-44.9, adult      ESRD on dialysis      Bipolar disorder      DM (diabetes mellitus)      HTN (hypertension)      Migraine      COPD (chronic obstructive pulmonary disease)      Renal failure      Asthma with COPD      Diabetes mellitus, type 2      Hypertension      Schizophrenia      Anxiety      Schizophrenia      COPD (chronic obstructive pulmonary disease)      HTN (hypertension)      ESRD on dialysis      HLD (hyperlipidemia)      History of cholecystitis      Smokes tobacco daily      H/O hernia repair      S/P arteriovenous (AV) fistula creation  right side      History of cholecystectomy          FAMILY HISTORY:  FH: lymphoma (Sibling)    FH: rheumatoid arthritis (Sibling)        Social History:   [ ] Denies ETOH use, illicit drug use and smoking    Allergies    shellfish (Rash)  [place] (Anaphylaxis)  penicillins (Swelling)  penicillin (Anaphylaxis)  fish (Unknown)  shellfish (Anaphylaxis)  aspirin (Swelling)  Seafood (Hives)    Intolerances        MEDICATIONS  (STANDING):  acetaminophen   IVPB .. 1000 milliGRAM(s) IV Intermittent once  diatrizoate meglumine/diatrizoate sodium. 30 milliLiter(s) Oral once  epoetin cyndie-epbx (RETACRIT) Injectable 4000 Unit(s) IV Push <User Schedule>  heparin   Injectable 7500 Unit(s) SubCutaneous every 8 hours  hydrALAZINE Injectable 25 milliGRAM(s) IV Push every 12 hours  labetalol Injectable 10 milliGRAM(s) IV Push every 12 hours  metoclopramide Injectable 10 milliGRAM(s) IV Push <User Schedule>  ondansetron Injectable 4 milliGRAM(s) IV Push <User Schedule>  pantoprazole  Injectable 40 milliGRAM(s) IV Push every 24 hours  sodium chloride 0.9% lock flush 3 milliLiter(s) IV Push every 8 hours  sodium chloride 0.9%. 1000 milliLiter(s) (65 mL/Hr) IV Continuous <Continuous>    MEDICATIONS  (PRN):  albuterol    90 MICROgram(s) HFA Inhaler 2 Puff(s) Inhalation every 6 hours PRN Shortness of Breath and/or Wheezing  HYDROmorphone  Injectable 0.5 milliGRAM(s) IV Push every 2 hours PRN Moderate Pain (4 - 6)  HYDROmorphone  Injectable 1 milliGRAM(s) IV Push every 3 hours PRN Severe Pain (7 - 10)  ketorolac   Injectable 15 milliGRAM(s) IV Push every 6 hours PRN Moderate Pain (4 - 6)      Vital Signs Last 24 Hrs  T(C): 37.4 (07 May 2025 05:09), Max: 37.4 (07 May 2025 05:09)  T(F): 99.3 (07 May 2025 05:09), Max: 99.3 (07 May 2025 05:09)  HR: 70 (07 May 2025 05:51) (61 - 86)  BP: 160/82 (07 May 2025 05:51) (132/74 - 184/86)  BP(mean): 123 (07 May 2025 05:09) (102 - 123)  RR: 18 (07 May 2025 05:09) (17 - 20)  SpO2: 98% (07 May 2025 05:09) (96% - 100%)    Parameters below as of 07 May 2025 05:09  Patient On (Oxygen Delivery Method): nasal cannula  O2 Flow (L/min): 2      LABS: Reviewed.                          10.0   6.63  )-----------( 127      ( 07 May 2025 07:18 )             28.9     05-07    124[L]  |  88[L]  |  105[H]  ----------------------------<  92  5.2   |  25  |  8.97[H]    Ca    9.0      07 May 2025 07:18  Phos  5.9     05-07  Mg     2.1     05-07          Urinalysis Basic - ( 07 May 2025 07:18 )    Color: x / Appearance: x / SG: x / pH: x  Gluc: 92 mg/dL / Ketone: x  / Bili: x / Urobili: x   Blood: x / Protein: x / Nitrite: x   Leuk Esterase: x / RBC: x / WBC x   Sq Epi: x / Non Sq Epi: x / Bacteria: x      CAPILLARY BLOOD GLUCOSE      POCT Blood Glucose.: 97 mg/dL (06 May 2025 11:32)        Radiology: Reviewed.     ORT Score -   Family Hx of substance abuse	Female	      Male  Alcohol 	                                           1                     3  Illegal drugs	                                   2                     3  Rx drugs                                           4 	                  4  Personal Hx of substance abuse		  Alcohol 	                                          3	                  3  Illegal drugs                                     4	                  4  Rx drugs                                            5 	                  5  Age between 16- 45 years	           1                     1  hx preadolescent sexual abuse	   3 	                  0  Psychological disease		  ADD, OCD, bipolar, schizophrenia   2	          2  Depression                                           1 	          1  Total: 0    a score of 3 or lower indicates low risk for opioid abuse		  a score of 4-7 indicates moderate risk for opioid abuse		  a score of 8 or higher indicates high risk for opioid abuse  	  4AT (Assessment test for delirium & cognitive impairment)  _________________________________________________________  [1] ALERTNESS  This includes patients who may be markedly drowsy (eg. difficult to rouse and/or obviously sleepy  during assessment) or agitated/hyperactive. Observe the patient. If asleep, attempt to wake with  speech or gentle touch on shoulder. Ask the patient to state their name and address to assist rating.  Normal (fully alert, but not agitated, throughout assessment) 0  Mild sleepiness for <10 seconds after waking, then normal 0  Clearly abnormal 4    [2] AMT4  Age, date of birth, place (name of the hospital or building), current year.  No mistakes 0  1 mistake 1  2 or more mistakes/untestable 2    [3] ATTENTION  Ask the patient: “Please tell me the months of the year in backwards order, starting at December.”  To assist initial understanding one prompt of “what is the month before December?” is permitted.  Months of the year backwards Achieves 7 months or more correctly 0  Starts but scores <7 months / refuses to start 1   Untestable (cannot start because unwell, drowsy, inattentive) 2    [4] ACUTE CHANGE OR FLUCTUATING COURSE  Evidence of significant change or fluctuation in: alertness, cognition, other mental function  (eg. paranoia, hallucinations) arising over the last 2 weeks and still evident in last 24hrs  No 0  Yes 4    4 or above: possible delirium +/- cognitive impairment  1-3: possible cognitive impairment  0: delirium or severe cognitive impairment unlikely (but delirium still possible if [4] information incomplete)    4AT SCORE: 0    REVIEW OF SYSTEMS:  CONSTITUTIONAL: No fever or fatigue  HEENT:  No difficulty hearing, no change in vision  NECK: No pain or stiffness  RESPIRATORY: No cough, wheezing, chills or hemoptysis; No shortness of breath  CARDIOVASCULAR: No chest pain, palpitations, dizziness, or leg swelling  GASTROINTESTINAL: No loss of appetite, decreased PO intake. No abdominal or epigastric pain. No nausea, vomiting; No diarrhea or constipation.   GENITOURINARY: No dysuria, frequency, hematuria, retention or incontinence  MUSCULOSKELETAL: No joint pain or swelling; No muscle, back, or extremity pain, no upper or lower motor strength weakness, no saddle anesthesia, bowel/bladder incontinence, no falls   NEURO: No headaches, No numbness/tingling b/l LE, No weakness  ENDOCRINE: No polyuria, polydipsia, heat or cold intolerance; No hair loss  PSYCHIATRIC: No depression, anxiety or difficulty sleeping    PHYSICAL EXAM:  GENERAL:  Alert & Oriented X4, cooperative, NAD, Good concentration. Speech is clear.   RESPIRATORY: Respirations even and unlabored. Clear to auscultation bilaterally; No rales, rhonchi, wheezing, or rubs  CARDIOVASCULAR: Normal S1/S2, regular rate and rhythm; No murmurs, rubs, or gallops. No JVD.   GASTROINTESTINAL:  Soft, Nontender, Nondistended; Bowel sounds present  PERIPHERAL VASCULAR:  Extremities warm without edema. 2+ Peripheral Pulses, No cyanosis, No calf tenderness  MUSCULOSKELETAL: Motor Strength 5/5 B/L upper and lower extremities; moves all extremities equally against gravity; ROM intact; negative SLR; No tenderness on palpation of all joints.   SKIN: Warm, dry, intact. No rashes, lesions, scars or wounds.     Risk factors associated with adverse outcomes related to opioid treatment  [ ]  Concurrent benzodiazepine use  [ ]  History/ Active substance use or alcohol use disorder  [ ] Psychiatric co-morbidity  [ ] Sleep apnea  [ ] COPD  [ ] BMI> 35  [ ] Liver dysfunction  [ ] Renal dysfunction  [ ] CHF  [ ] Smoker  [ ]  Age > 60 years    [ ]  [place]  Reviewed and Copied to Chart. See below.    Plan of care and goal oriented pain management treatment options were discussed with patient and /or primary care giver; all questions and concerns were addressed and care was aligned with patient's wishes.    Educated patient on goal oriented pain management treatment options        Source of information: PRESTON JOHNSON, Chart review  Patient language: English  : n/a    HPI:  44year old morbidly obese male with pmhx of anxiety, bipolar disorder, schizophrenia, ESRD on HD (M,W, F), asthma with COPD, hypertension, hyperlipidemia, migraine and cholecystitis presents with c/o failure to loose despite several dietary modalities, calorie reduction, portion control. Patient is here today for presurgical testing for scheduled Robotic Assisted Laparoscopic Sleeve Gastrectomy possible open on 4/28/2025 (16 Apr 2025 16:41)    Pt is admitted for scheduled gastrectomy surgery. Pain consulted for Post-operative abdominal pain 5/7. Pt is s/p Robotic Assisted Laparoscopic Sleeve Gastrectomy on 5/5. POD#2. Attempted to see pt today twice, but per RN pt on Dialysis. Finally, pt seen in unit. Pt found sitting up in bed, awake, alert and oriented x3. No acute distress noted or facial grimacing observed. Pt reports pain score 8/10 currently. SCALE USED: (1-10 VNRS). Pt describes pain as aching and throbbing through abdomen, non-radiating and minimally alleviated by pain medication and exacerbated by movement. Of note, pt seen ambulating with PT in unit with no assistance devices, tolerated well. Pt tolerating bariatric clear liquid diet. Denies lethargy, vomiting, constipation, itchiness. Reports intermittently nausea, medicated with antiemetics. Patient stated goal for pain control: to be able to take deep breaths, get out of bed to chair and ambulate with tolerable pain control. Pt denies taking medications for pain at home.     PAST MEDICAL & SURGICAL HISTORY:  Morbid obesity with BMI of 40.0-44.9, adult    ESRD on dialysis    Bipolar disorder    DM (diabetes mellitus)    HTN (hypertension)    Migraine    COPD (chronic obstructive pulmonary disease)    Renal failure    Asthma with COPD    Diabetes mellitus, type 2    Hypertension    Schizophrenia    Anxiety    Schizophrenia    COPD (chronic obstructive pulmonary disease)    HTN (hypertension)    ESRD on dialysis    HLD (hyperlipidemia)    History of cholecystitis    Smokes tobacco daily    H/O hernia repair    S/P arteriovenous (AV) fistula creation  right side    History of cholecystectomy    FAMILY HISTORY:  FH: lymphoma (Sibling)    FH: rheumatoid arthritis (Sibling)    Social History:   [x] Denies ETOH use, illicit drug use  + daily smoking  + socially drinking    Allergies    shellfish (Rash)  [place] (Anaphylaxis)  penicillins (Swelling)  penicillin (Anaphylaxis)  fish (Unknown)  shellfish (Anaphylaxis)  aspirin (Swelling)  Seafood (Hives)    Intolerances    MEDICATIONS  (STANDING):  acetaminophen   IVPB .. 1000 milliGRAM(s) IV Intermittent once  diatrizoate meglumine/diatrizoate sodium. 30 milliLiter(s) Oral once  epoetin cyndie-epbx (RETACRIT) Injectable 4000 Unit(s) IV Push <User Schedule>  heparin   Injectable 7500 Unit(s) SubCutaneous every 8 hours  hydrALAZINE Injectable 25 milliGRAM(s) IV Push every 12 hours  labetalol Injectable 10 milliGRAM(s) IV Push every 12 hours  metoclopramide Injectable 10 milliGRAM(s) IV Push <User Schedule>  ondansetron Injectable 4 milliGRAM(s) IV Push <User Schedule>  pantoprazole  Injectable 40 milliGRAM(s) IV Push every 24 hours  sodium chloride 0.9% lock flush 3 milliLiter(s) IV Push every 8 hours  sodium chloride 0.9%. 1000 milliLiter(s) (65 mL/Hr) IV Continuous <Continuous>    MEDICATIONS  (PRN):  albuterol    90 MICROgram(s) HFA Inhaler 2 Puff(s) Inhalation every 6 hours PRN Shortness of Breath and/or Wheezing  HYDROmorphone  Injectable 0.5 milliGRAM(s) IV Push every 2 hours PRN Moderate Pain (4 - 6)  HYDROmorphone  Injectable 1 milliGRAM(s) IV Push every 3 hours PRN Severe Pain (7 - 10)  ketorolac   Injectable 15 milliGRAM(s) IV Push every 6 hours PRN Moderate Pain (4 - 6)    Vital Signs Last 24 Hrs  T(C): 37.4 (07 May 2025 05:09), Max: 37.4 (07 May 2025 05:09)  T(F): 99.3 (07 May 2025 05:09), Max: 99.3 (07 May 2025 05:09)  HR: 70 (07 May 2025 05:51) (61 - 86)  BP: 160/82 (07 May 2025 05:51) (132/74 - 184/86)  BP(mean): 123 (07 May 2025 05:09) (102 - 123)  RR: 18 (07 May 2025 05:09) (17 - 20)  SpO2: 98% (07 May 2025 05:09) (96% - 100%)    Parameters below as of 07 May 2025 05:09  Patient On (Oxygen Delivery Method): nasal cannula  O2 Flow (L/min): 2    LABS: Reviewed.                        10.0   6.63  )-----------( 127      ( 07 May 2025 07:18 )             28.9     05-07    124[L]  |  88[L]  |  105[H]  ----------------------------<  92  5.2   |  25  |  8.97[H]    Ca    9.0      07 May 2025 07:18  Phos  5.9     05-07  Mg     2.1     05-07    Urinalysis Basic - ( 07 May 2025 07:18 )    Color: x / Appearance: x / SG: x / pH: x  Gluc: 92 mg/dL / Ketone: x  / Bili: x / Urobili: x   Blood: x / Protein: x / Nitrite: x   Leuk Esterase: x / RBC: x / WBC x   Sq Epi: x / Non Sq Epi: x / Bacteria: x    CAPILLARY BLOOD GLUCOSE    POCT Blood Glucose.: 97 mg/dL (06 May 2025 11:32)    Radiology: Reviewed.   ACC: [id number] EXAM:  XR ESOPH SNGL CON STUDY   ORDERED BY: FABIO CRUZ     PROCEDURE DATE:  05/06/2025      INTERPRETATION:  Single contrast esophagram    INDICATION: Status post sleeve gastrectomy. Rule out leak.    TECHNIQUE: Single supine AP  view of the abdomen is acquired prior   to the procedure. Single contrast esophagram is then performed using   Omnipaque 350.    COMPARISON: None.    FINDINGS: Contrast passes through the esophagus, GE junction and into the   gastric pouch without evidence for an esophageal stricture, or mass   lesion. No evidence for a hiatal hernia. Esophageal spasm is noted with   associated intraesophageal reflux.    Contrast passes through the gastric sleeve freely. There is no evidence   for oral contrast extravasation.    IMPRESSION: No evidence for oral contrast extravasation.    Esophageal spasm with associated intraesophageal reflux.    --- End of Report ---    ERICK BOWERS MD; Attending Radiologist  This document has been electronically signed. May  6 2025 10:16AM    ORT Score -   Family Hx of substance abuse	Female	      Male  Alcohol 	                                           1                     3  Illegal drugs	                                   2                     3  Rx drugs                                           4 	                  4  Personal Hx of substance abuse		  Alcohol 	                                          3	                  3  Illegal drugs                                     4	                  4  Rx drugs                                            5 	                  5  Age between 16- 45 years	           1                     1  hx preadolescent sexual abuse	   3 	                  0  Psychological disease		  ADD, OCD, bipolar, schizophrenia   2	          2  Depression                                           1 	          1  Total: 3    a score of 3 or lower indicates low risk for opioid abuse		  a score of 4-7 indicates moderate risk for opioid abuse		  a score of 8 or higher indicates high risk for opioid abuse  	  REVIEW OF SYSTEMS:  CONSTITUTIONAL: No fever or fatigue  HEENT:  No difficulty hearing, no change in vision  NECK: No pain or stiffness  RESPIRATORY: No cough, wheezing, chills or hemoptysis; No shortness of breath  CARDIOVASCULAR: No chest pain, palpitations, dizziness, or leg swelling  GASTROINTESTINAL: No loss of appetite, decreased PO intake. + abdominal  pain. + nausea, no vomiting; No diarrhea or constipation.   GENITOURINARY: No dysuria, frequency, hematuria, retention or incontinence  MUSCULOSKELETAL: No joint pain or swelling; No muscle, back, or extremity pain, no upper or lower motor strength weakness, no saddle anesthesia, bowel/bladder incontinence, no falls   NEURO: No headaches, No numbness/tingling b/l LE, No weakness  ENDOCRINE: No polyuria, polydipsia, heat or cold intolerance; No hair loss  PSYCHIATRIC: No depression, + anxiety and Schizophrenia, no difficulty sleeping    PHYSICAL EXAM:  GENERAL:  Alert & Oriented X4, cooperative, NAD, Good concentration. Speech is clear.   RESPIRATORY: Respirations even and unlabored. Clear to auscultation bilaterally; No rales, rhonchi, wheezing, or rubs. 2L NC  CARDIOVASCULAR: Normal S1/S2, regular rate and rhythm; No murmurs, rubs, or gallops. No JVD.   GASTROINTESTINAL:  Soft, + tender 2/2 incisional pain, Nondistended; Bowel sounds present, morbid obese, steri strips x 8 in place, CDI  PERIPHERAL VASCULAR:  Extremities warm without edema. 2+ Peripheral Pulses, No cyanosis, No calf tenderness. BUE with AV fistula, +bruit +thrill  MUSCULOSKELETAL: Motor Strength 5/5 B/L upper and lower extremities; moves all extremities equally against gravity; ROM intact; negative SLR; No tenderness on palpation of all joints.   SKIN: Warm, dry, intact. No rashes, lesions, or wounds. + Abdomen, Left arm, right chest scar, dark pigmentation to BLE    Risk factors associated with adverse outcomes related to opioid treatment  [ ]  Concurrent benzodiazepine use  [ ]  History/ Active substance use or alcohol use disorder  [x] Psychiatric co-morbidity. Hx of Anxiety and Schizophrenia  [ ] Sleep apnea  [x] COPD  [x] BMI> 35  [ ] Liver dysfunction  [x] Renal dysfunction  [ ] CHF  [x] Smoker  [ ]  Age > 60 years    [x]  [place]  Reviewed and Copied to Chart. See below.    Plan of care and goal oriented pain management treatment options were discussed with patient and /or primary care giver; all questions and concerns were addressed and care was aligned with patient's wishes.    Educated patient on goal oriented pain management treatment options

## 2025-05-07 NOTE — PHYSICAL THERAPY INITIAL EVALUATION ADULT - PERTINENT HX OF CURRENT PROBLEM, REHAB EVAL
Pt is s/p RA Sleeve gastrectomy POD#2. Pt states that he is still experiencing severe pain and still is having difficulty tolerating clear fluids.

## 2025-05-08 LAB
ALBUMIN SERPL ELPH-MCNC: 3.6 G/DL — SIGNIFICANT CHANGE UP (ref 3.5–5)
ALP SERPL-CCNC: 229 U/L — HIGH (ref 40–120)
ALT FLD-CCNC: 138 U/L DA — HIGH (ref 10–60)
ANION GAP SERPL CALC-SCNC: 13 MMOL/L — SIGNIFICANT CHANGE UP (ref 5–17)
APTT BLD: 37.2 SEC — HIGH (ref 26.1–36.8)
AST SERPL-CCNC: 94 U/L — HIGH (ref 10–40)
BASE EXCESS BLDV CALC-SCNC: 3.2 MMOL/L — SIGNIFICANT CHANGE UP
BASOPHILS # BLD AUTO: 0.01 K/UL — SIGNIFICANT CHANGE UP (ref 0–0.2)
BASOPHILS NFR BLD AUTO: 0.1 % — SIGNIFICANT CHANGE UP (ref 0–2)
BILIRUB SERPL-MCNC: 0.8 MG/DL — SIGNIFICANT CHANGE UP (ref 0.2–1.2)
BUN SERPL-MCNC: 75 MG/DL — HIGH (ref 7–18)
CALCIUM SERPL-MCNC: 9.2 MG/DL — SIGNIFICANT CHANGE UP (ref 8.4–10.5)
CHLORIDE SERPL-SCNC: 89 MMOL/L — LOW (ref 96–108)
CO2 SERPL-SCNC: 24 MMOL/L — SIGNIFICANT CHANGE UP (ref 22–31)
CREAT SERPL-MCNC: 7.81 MG/DL — HIGH (ref 0.5–1.3)
EGFR: 8 ML/MIN/1.73M2 — LOW
EGFR: 8 ML/MIN/1.73M2 — LOW
EOSINOPHIL # BLD AUTO: 0 K/UL — SIGNIFICANT CHANGE UP (ref 0–0.5)
EOSINOPHIL NFR BLD AUTO: 0 % — SIGNIFICANT CHANGE UP (ref 0–6)
FLUAV AG NPH QL: SIGNIFICANT CHANGE UP
FLUBV AG NPH QL: SIGNIFICANT CHANGE UP
GLUCOSE SERPL-MCNC: 100 MG/DL — HIGH (ref 70–99)
HCO3 BLDV-SCNC: 29 MMOL/L — SIGNIFICANT CHANGE UP (ref 22–29)
HCT VFR BLD CALC: 31 % — LOW (ref 39–50)
HGB BLD-MCNC: 10.3 G/DL — LOW (ref 13–17)
IMM GRANULOCYTES NFR BLD AUTO: 0.4 % — SIGNIFICANT CHANGE UP (ref 0–0.9)
INR BLD: 1.1 RATIO — SIGNIFICANT CHANGE UP (ref 0.85–1.16)
LACTATE SERPL-SCNC: 0.9 MMOL/L — SIGNIFICANT CHANGE UP (ref 0.7–2)
LYMPHOCYTES # BLD AUTO: 0.33 K/UL — LOW (ref 1–3.3)
LYMPHOCYTES # BLD AUTO: 3.9 % — LOW (ref 13–44)
MCHC RBC-ENTMCNC: 32 PG — SIGNIFICANT CHANGE UP (ref 27–34)
MCHC RBC-ENTMCNC: 33.2 G/DL — SIGNIFICANT CHANGE UP (ref 32–36)
MCV RBC AUTO: 96.3 FL — SIGNIFICANT CHANGE UP (ref 80–100)
MONOCYTES # BLD AUTO: 0.97 K/UL — HIGH (ref 0–0.9)
MONOCYTES NFR BLD AUTO: 11.4 % — SIGNIFICANT CHANGE UP (ref 2–14)
NEUTROPHILS # BLD AUTO: 7.15 K/UL — SIGNIFICANT CHANGE UP (ref 1.8–7.4)
NEUTROPHILS NFR BLD AUTO: 84.2 % — HIGH (ref 43–77)
NRBC BLD AUTO-RTO: 0 /100 WBCS — SIGNIFICANT CHANGE UP (ref 0–0)
PCO2 BLDV: 52 MMHG — SIGNIFICANT CHANGE UP (ref 42–55)
PH BLDV: 7.36 — SIGNIFICANT CHANGE UP (ref 7.32–7.43)
PLATELET # BLD AUTO: 122 K/UL — LOW (ref 150–400)
PO2 BLDV: 34 MMHG — SIGNIFICANT CHANGE UP
POTASSIUM SERPL-MCNC: 5.7 MMOL/L — HIGH (ref 3.5–5.3)
POTASSIUM SERPL-SCNC: 5.7 MMOL/L — HIGH (ref 3.5–5.3)
PROT SERPL-MCNC: 8.4 G/DL — HIGH (ref 6–8.3)
PROTHROM AB SERPL-ACNC: 12.8 SEC — SIGNIFICANT CHANGE UP (ref 9.9–13.4)
RBC # BLD: 3.22 M/UL — LOW (ref 4.2–5.8)
RBC # FLD: 13.8 % — SIGNIFICANT CHANGE UP (ref 10.3–14.5)
RSV RNA NPH QL NAA+NON-PROBE: SIGNIFICANT CHANGE UP
SAO2 % BLDV: 61.8 % — SIGNIFICANT CHANGE UP
SARS-COV-2 RNA SPEC QL NAA+PROBE: SIGNIFICANT CHANGE UP
SODIUM SERPL-SCNC: 126 MMOL/L — LOW (ref 135–145)
SOURCE RESPIRATORY: SIGNIFICANT CHANGE UP
WBC # BLD: 8.49 K/UL — SIGNIFICANT CHANGE UP (ref 3.8–10.5)
WBC # FLD AUTO: 8.49 K/UL — SIGNIFICANT CHANGE UP (ref 3.8–10.5)

## 2025-05-08 PROCEDURE — 99232 SBSQ HOSP IP/OBS MODERATE 35: CPT | Mod: FS

## 2025-05-08 PROCEDURE — 93010 ELECTROCARDIOGRAM REPORT: CPT

## 2025-05-08 PROCEDURE — 36000 PLACE NEEDLE IN VEIN: CPT

## 2025-05-08 PROCEDURE — 71045 X-RAY EXAM CHEST 1 VIEW: CPT | Mod: 26

## 2025-05-08 PROCEDURE — 74150 CT ABDOMEN W/O CONTRAST: CPT | Mod: 26

## 2025-05-08 PROCEDURE — 78582 LUNG VENTILAT&PERFUS IMAGING: CPT | Mod: 26

## 2025-05-08 RX ORDER — METRONIDAZOLE 250 MG
500 TABLET ORAL ONCE
Refills: 0 | Status: COMPLETED | OUTPATIENT
Start: 2025-05-08 | End: 2025-05-08

## 2025-05-08 RX ORDER — AZTREONAM 2 G/1
INJECTION, POWDER, LYOPHILIZED, FOR SOLUTION INTRAMUSCULAR; INTRAVENOUS
Refills: 0 | Status: DISCONTINUED | OUTPATIENT
Start: 2025-05-08 | End: 2025-05-08

## 2025-05-08 RX ORDER — METRONIDAZOLE 250 MG
500 TABLET ORAL EVERY 12 HOURS
Refills: 0 | Status: DISCONTINUED | OUTPATIENT
Start: 2025-05-08 | End: 2025-05-09

## 2025-05-08 RX ORDER — ACETAMINOPHEN 500 MG/5ML
1000 LIQUID (ML) ORAL ONCE
Refills: 0 | Status: COMPLETED | OUTPATIENT
Start: 2025-05-08 | End: 2025-05-08

## 2025-05-08 RX ORDER — IOHEXOL 350 MG/ML
30 INJECTION, SOLUTION INTRAVENOUS ONCE
Refills: 0 | Status: DISCONTINUED | OUTPATIENT
Start: 2025-05-08 | End: 2025-05-09

## 2025-05-08 RX ORDER — CEFEPIME 2 G/20ML
1000 INJECTION, POWDER, FOR SOLUTION INTRAVENOUS EVERY 24 HOURS
Refills: 0 | Status: DISCONTINUED | OUTPATIENT
Start: 2025-05-09 | End: 2025-05-09

## 2025-05-08 RX ORDER — METRONIDAZOLE 250 MG
TABLET ORAL
Refills: 0 | Status: DISCONTINUED | OUTPATIENT
Start: 2025-05-08 | End: 2025-05-09

## 2025-05-08 RX ORDER — CEFEPIME 2 G/20ML
2000 INJECTION, POWDER, FOR SOLUTION INTRAVENOUS ONCE
Refills: 0 | Status: COMPLETED | OUTPATIENT
Start: 2025-05-08 | End: 2025-05-08

## 2025-05-08 RX ADMIN — CEFEPIME 100 MILLIGRAM(S): 2 INJECTION, POWDER, FOR SOLUTION INTRAVENOUS at 16:43

## 2025-05-08 RX ADMIN — HEPARIN SODIUM 5000 UNIT(S): 1000 INJECTION INTRAVENOUS; SUBCUTANEOUS at 16:57

## 2025-05-08 RX ADMIN — Medication 1 MILLIGRAM(S): at 11:39

## 2025-05-08 RX ADMIN — Medication 10 MILLIGRAM(S): at 05:46

## 2025-05-08 RX ADMIN — EPOETIN ALFA 4000 UNIT(S): 10000 SOLUTION INTRAVENOUS; SUBCUTANEOUS at 19:12

## 2025-05-08 RX ADMIN — Medication 10 MILLIGRAM(S): at 23:14

## 2025-05-08 RX ADMIN — Medication 40 MILLIGRAM(S): at 05:47

## 2025-05-08 RX ADMIN — SCOPOLAMINE 1 PATCH: 1 PATCH, EXTENDED RELEASE TRANSDERMAL at 22:03

## 2025-05-08 RX ADMIN — Medication 10 MILLIGRAM(S): at 11:38

## 2025-05-08 RX ADMIN — Medication 4 MILLIGRAM(S): at 07:30

## 2025-05-08 RX ADMIN — Medication 25 MILLIGRAM(S): at 05:46

## 2025-05-08 RX ADMIN — SCOPOLAMINE 1 PATCH: 1 PATCH, EXTENDED RELEASE TRANSDERMAL at 06:37

## 2025-05-08 RX ADMIN — Medication 1 MILLIGRAM(S): at 22:08

## 2025-05-08 RX ADMIN — Medication 1000 MILLIGRAM(S): at 13:53

## 2025-05-08 RX ADMIN — Medication 4 MILLIGRAM(S): at 20:58

## 2025-05-08 RX ADMIN — Medication 4 MILLIGRAM(S): at 02:06

## 2025-05-08 RX ADMIN — LABETALOL HYDROCHLORIDE 10 MILLIGRAM(S): 200 TABLET, FILM COATED ORAL at 05:46

## 2025-05-08 RX ADMIN — Medication 1 MILLIGRAM(S): at 21:21

## 2025-05-08 RX ADMIN — HEPARIN SODIUM 7500 UNIT(S): 1000 INJECTION INTRAVENOUS; SUBCUTANEOUS at 20:58

## 2025-05-08 RX ADMIN — Medication 1 MILLIGRAM(S): at 13:54

## 2025-05-08 RX ADMIN — Medication 1000 MILLIGRAM(S): at 00:30

## 2025-05-08 RX ADMIN — Medication 100 MILLIGRAM(S): at 20:58

## 2025-05-08 RX ADMIN — Medication 1 MILLIGRAM(S): at 07:30

## 2025-05-08 RX ADMIN — Medication 400 MILLIGRAM(S): at 08:26

## 2025-05-08 RX ADMIN — Medication 1 MILLIGRAM(S): at 16:42

## 2025-05-08 RX ADMIN — HEPARIN SODIUM 7500 UNIT(S): 1000 INJECTION INTRAVENOUS; SUBCUTANEOUS at 02:06

## 2025-05-08 RX ADMIN — Medication 1 MILLIGRAM(S): at 04:10

## 2025-05-08 RX ADMIN — Medication 3 MILLILITER(S): at 21:02

## 2025-05-08 RX ADMIN — Medication 100 MILLIGRAM(S): at 08:43

## 2025-05-08 RX ADMIN — Medication 1 MILLIGRAM(S): at 10:59

## 2025-05-08 RX ADMIN — Medication 3 MILLILITER(S): at 05:44

## 2025-05-08 NOTE — CHART NOTE - NSCHARTNOTESELECT_GEN_ALL_CORE
Code Sepsis/Event Note
Nephrology/Event Note
Nutrition Services
follow up for Code sepsis/Event Note

## 2025-05-08 NOTE — PROCEDURE NOTE - ADDITIONAL PROCEDURE DETAILS
18G 6cm extended dwell placed under ultrasound guidance in right brachial vein. Venous blood gas sent for confirmation.

## 2025-05-08 NOTE — PHARMACOTHERAPY INTERVENTION NOTE - OUTCOME
"Plan:  1. Admit to [facility] on voluntarily 201 commitment for safety and treatment of \"I wanted to die\"  2. Continue standard q 15 minute observations as no 1:1 CO needed at this time as patient feels safe on the unit.  3. Psych-Continue Lexapro 5mg daily for depression. Plan to titrate as clinically indicated. Pt working on re-framing intrusive thoughts and self-esteem.  4. Medical- standard care  5. Will coordinate discharge planning with case management to include referrals for outpatient psychiatry and therapy.    Risks, benefits, and possible side effects of medications explained to patient and patient verbalizes understanding.    " accepted

## 2025-05-08 NOTE — CHART NOTE - NSCHARTNOTEFT_GEN_A_CORE
Code sepsis activated due to patient febrile to 101F and with chest pain.  Patient assesed at bedside with surgical team.  Patient 3 days post op from sleeve gastrectomy and ESRD patient (no urine production).  Patinet complainging of chest pain with deep breath mostly on left side.  Patient now on 2L NC.  Patient with tachycardia on exam, EKG showing HR 80s, Vitals showing patient hypertesnive to SBP to 180s, O2 mid 90s on 2L, HR between 80s-100s.  Patients wells scores elevated to 4.5.  Discussed case with surgical team who agree with plan for CTA PE protocol.  Discussed case with Nephrology who agrees with plan for CTA Chest now with planned HD argenis.  will obtain full septic workup (however unable to obtain urine studies given patient does not produce urine).  F/U blood cutlures.  Continue Tylenol.  Patient with noted angioedma reaction to Penicillin per chart.  will do Azetronam and Flagyl for now post op and recommend ID consult.  Discussed with surgical team

## 2025-05-08 NOTE — PHARMACOTHERAPY INTERVENTION NOTE - COMMENTS
Given patient has tolerated cefepime in the past, suggest to switch aztreonam to cefepime 2g x 1 followed by 1 g every 24 hours (after dialysis on dialysis days).

## 2025-05-08 NOTE — CHART NOTE - NSCHARTNOTEFT_GEN_A_CORE
Patient reassessed.  patient no longer febrile, Temp now 99F, HR 85, SBP 160s, Remaining saturating well on 2L NC 99%.  Patient WBC of 8.49, K of 5.7, Lactate normal at 0.9, Flu / covid swab negative, CXR with limited view of costophrenic angels however appears negative for signs of infection.  Difficulty obtain IV access for CTA, ICU NP Dakota aided with mid line placement.  Patient going down for CTA, will f/u read.  Discussed with in house pharmacy, patient previously tolerated cefepime without reaction, will switch to cefepime flagyl and recommending ID Consult.  Discussed plan with surgical team. Patient reassessed.  patient no longer febrile, Temp now 99F, HR 85, SBP 160s, Remaining saturating well on 2L NC 99%.  Patient WBC of 8.49, K of 5.7, Lactate normal at 0.9, Flu / covid swab negative, CXR with limited view of costophrenic angels however appears negative for signs of infection.  Difficulty obtain IV access for CTA, ICU NP Dakota aided with mid line placement.  Patient going down for CTA, will f/u read.  Discussed with in house pharmacy, patient previously tolerated cefepime without reaction, will switch to cefepime flagyl and recommending ID Consult.  Discussed plan with surgical team.    Patient was unable to have CTA Chest, VQ Scan preformed and unlikely for PE.  Continue IV ABx for now, no current need for full dose AC.

## 2025-05-09 ENCOUNTER — TRANSCRIPTION ENCOUNTER (OUTPATIENT)
Age: 45
End: 2025-05-09

## 2025-05-09 VITALS — RESPIRATION RATE: 18 BRPM | OXYGEN SATURATION: 95 %

## 2025-05-09 LAB
ANION GAP SERPL CALC-SCNC: 10 MMOL/L — SIGNIFICANT CHANGE UP (ref 5–17)
BUN SERPL-MCNC: 52 MG/DL — HIGH (ref 7–18)
CALCIUM SERPL-MCNC: 9.7 MG/DL — SIGNIFICANT CHANGE UP (ref 8.4–10.5)
CHLORIDE SERPL-SCNC: 93 MMOL/L — LOW (ref 96–108)
CO2 SERPL-SCNC: 25 MMOL/L — SIGNIFICANT CHANGE UP (ref 22–31)
CREAT SERPL-MCNC: 6.88 MG/DL — HIGH (ref 0.5–1.3)
EGFR: 9 ML/MIN/1.73M2 — LOW
EGFR: 9 ML/MIN/1.73M2 — LOW
GLUCOSE SERPL-MCNC: 94 MG/DL — SIGNIFICANT CHANGE UP (ref 70–99)
HCT VFR BLD CALC: 29.9 % — LOW (ref 39–50)
HGB BLD-MCNC: 9.9 G/DL — LOW (ref 13–17)
MCHC RBC-ENTMCNC: 32.2 PG — SIGNIFICANT CHANGE UP (ref 27–34)
MCHC RBC-ENTMCNC: 33.1 G/DL — SIGNIFICANT CHANGE UP (ref 32–36)
MCV RBC AUTO: 97.4 FL — SIGNIFICANT CHANGE UP (ref 80–100)
NRBC BLD AUTO-RTO: 0 /100 WBCS — SIGNIFICANT CHANGE UP (ref 0–0)
PLATELET # BLD AUTO: 122 K/UL — LOW (ref 150–400)
POTASSIUM SERPL-MCNC: 4.7 MMOL/L — SIGNIFICANT CHANGE UP (ref 3.5–5.3)
POTASSIUM SERPL-SCNC: 4.7 MMOL/L — SIGNIFICANT CHANGE UP (ref 3.5–5.3)
RBC # BLD: 3.07 M/UL — LOW (ref 4.2–5.8)
RBC # FLD: 13.6 % — SIGNIFICANT CHANGE UP (ref 10.3–14.5)
SODIUM SERPL-SCNC: 128 MMOL/L — LOW (ref 135–145)
WBC # BLD: 6.8 K/UL — SIGNIFICANT CHANGE UP (ref 3.8–10.5)
WBC # FLD AUTO: 6.8 K/UL — SIGNIFICANT CHANGE UP (ref 3.8–10.5)

## 2025-05-09 PROCEDURE — 99232 SBSQ HOSP IP/OBS MODERATE 35: CPT | Mod: FS

## 2025-05-09 RX ORDER — MONTELUKAST SODIUM 10 MG/1
1 TABLET ORAL
Qty: 0 | Refills: 0 | DISCHARGE

## 2025-05-09 RX ORDER — ATORVASTATIN CALCIUM 80 MG/1
40 TABLET, FILM COATED ORAL
Qty: 0 | Refills: 0 | DISCHARGE
Start: 2025-05-09

## 2025-05-09 RX ORDER — LIDOCAINE HYDROCHLORIDE 20 MG/ML
2 JELLY TOPICAL DAILY
Refills: 0 | Status: DISCONTINUED | OUTPATIENT
Start: 2025-05-09 | End: 2025-05-09

## 2025-05-09 RX ORDER — SEVELAMER HYDROCHLORIDE 800 MG/1
1 TABLET ORAL
Qty: 1 | Refills: 0
Start: 2025-05-09 | End: 2025-06-07

## 2025-05-09 RX ADMIN — Medication 25 MILLIGRAM(S): at 06:00

## 2025-05-09 RX ADMIN — Medication 1 MILLIGRAM(S): at 06:44

## 2025-05-09 RX ADMIN — Medication 2 MILLIGRAM(S): at 10:59

## 2025-05-09 RX ADMIN — Medication 100 MILLIGRAM(S): at 09:16

## 2025-05-09 RX ADMIN — Medication 10 MILLIGRAM(S): at 11:03

## 2025-05-09 RX ADMIN — Medication 4 MILLIGRAM(S): at 07:59

## 2025-05-09 RX ADMIN — HEPARIN SODIUM 7500 UNIT(S): 1000 INJECTION INTRAVENOUS; SUBCUTANEOUS at 06:00

## 2025-05-09 RX ADMIN — Medication 40 MILLIGRAM(S): at 06:00

## 2025-05-09 RX ADMIN — Medication 10 MILLIGRAM(S): at 05:59

## 2025-05-09 RX ADMIN — Medication 1 MILLIGRAM(S): at 06:18

## 2025-05-09 RX ADMIN — Medication 2 MILLIGRAM(S): at 11:38

## 2025-05-09 RX ADMIN — LABETALOL HYDROCHLORIDE 10 MILLIGRAM(S): 200 TABLET, FILM COATED ORAL at 05:59

## 2025-05-09 RX ADMIN — Medication 3 MILLILITER(S): at 06:06

## 2025-05-09 RX ADMIN — Medication 4 MILLIGRAM(S): at 01:53

## 2025-05-09 NOTE — DISCHARGE NOTE NURSING/CASE MANAGEMENT/SOCIAL WORK - NSDCPEFALRISK_GEN_ALL_CORE
Future Appointments   Date Time Provider Rubén Gomez   10/8/2020  4:00 PM Qian Greene DO [facility]  MMA   10/13/2020  4:45 PM Merrilyn Councilman, DO [facility]  MMA For information on Fall & Injury Prevention, visit: [email or web address] OR  [email or web address] OR  https://www.cdc.gov/steadi/patient.html

## 2025-05-09 NOTE — DISCHARGE NOTE NURSING/CASE MANAGEMENT/SOCIAL WORK - FINANCIAL ASSISTANCE
[facility] provides services at a reduced cost to those who are determined to be eligible through [facility]’s financial assistance [email or web address] Information regarding [facility]’s financial assistance program can be found by going to [email or web address] or by calling [phone].

## 2025-05-09 NOTE — PROGRESS NOTE ADULT - SUBJECTIVE AND OBJECTIVE BOX
INTERVAL HPI/OVERNIGHT EVENTS:  Patient seen and examined at bedside.  Endorses pain at incision cites. Denies any chest pain at this time. States he does have SOB. Pt on 2L NC. Pt on bariclears. Denies nausea, vomting.     Vital Signs Last 24 Hrs  T(C): 36.8 (09 May 2025 05:15), Max: 37.7 (08 May 2025 09:11)  T(F): 98.3 (09 May 2025 05:15), Max: 99.8 (08 May 2025 09:11)  HR: 74 (09 May 2025 05:15) (70 - 85)  BP: 159/75 (09 May 2025 05:15) (119/70 - 183/86)  BP(mean): 103 (09 May 2025 05:15) (100 - 103)  RR: 18 (09 May 2025 05:15) (16 - 20)  SpO2: 100% (09 May 2025 05:15) (98% - 100%)    Parameters below as of 09 May 2025 05:15  Patient On (Oxygen Delivery Method): nasal cannula  O2 Flow (L/min): 2    I&O's Detail    08 May 2025 07:01  -  09 May 2025 07:00  --------------------------------------------------------  IN:    Other (mL): 400 mL  Total IN: 400 mL    OUT:    Other (mL): 3000 mL  Total OUT: 3000 mL    Total NET: -2600 mL        cefepime   IVPB 1000 milliGRAM(s) IV Intermittent every 24 hours  diatrizoate meglumine/diatrizoate sodium. 30 milliLiter(s) Oral once  iohexol 300 mG (iodine)/mL Oral Solution 30 milliLiter(s) Oral once  metroNIDAZOLE  IVPB 500 milliGRAM(s) IV Intermittent every 12 hours  metroNIDAZOLE  IVPB      pantoprazole  Injectable 40 milliGRAM(s) IV Push every 24 hours      Physical Exam:  General: AAOx3, No acute distress  HEENT: NC/AT, trachea midline  Respiratory: 2L NC, Saturating 100%, equal chest rise b/l   Skin: No jaundice, no icterus  Abdomen: soft,  nondistended, tenderness at incision cites, no rebound tenderness, no guarding, no palpable masses      Drains/Tubes:     05-08-25 @ 07:01  -  05-09-25 @ 07:00  --------------------------------------------------------  IN: 400 mL / OUT: 3000 mL / NET: -2600 mL        Labs:                        9.9    6.80  )-----------( 122      ( 09 May 2025 06:10 )             29.9     05-09    128[L]  |  93[L]  |  52[H]  ----------------------------<  94  4.7   |  25  |  6.88[H]    Ca    9.7      09 May 2025 06:10    TPro  8.4[H]  /  Alb  3.6  /  TBili  0.8  /  DBili  x   /  AST  94[H]  /  ALT  138[H]  /  AlkPhos  229[H]  05-08    PT/INR - ( 08 May 2025 08:18 )   PT: 12.8 sec;   INR: 1.10 ratio         PTT - ( 08 May 2025 08:18 )  PTT:37.2 sec    RADIOLOGY & ADDITIONAL STUDIES:  < from: CT Abdomen w/ Oral Cont (05.08.25 @ 14:40) >  FINDINGS:  LOWER CHEST: And trace layering left pleural fluid and mild left lower   lobe dependent atelectasis.    LIVER: Within normal limits.  BILE DUCTS: Normal caliber.  GALLBLADDER: Removed.  SPLEEN: The spleen remains prominent to 14.8 cm in maximal axial   dimension.. Trace free fluid in the left upper quadrant.  PANCREAS: Within normal limits.  ADRENALS: Within normal limits.  KIDNEYS/URETERS: Is severely atrophic kidneys.    VISUALIZED PORTIONS:  BOWEL: Sleeve gastrectomy. There is a 5.8 x 2.7 cm hyperattenuating   collection adjacent to the surgical margin on image 42, compatible with   mild hematoma. Oral contrast in the esophagus, gastric cardia, gastric   antrum, and duodenal sweep. Contrast in the right colon, related to   esophagram performed May 6. No extravasation of oral contrast is   identified.  PERITONEUM/RETROPERITONEUM: Within normal limits.  VESSELS: Within normal limits.  LYMPH NODES: No lymphadenopathy.  ABDOMINAL WALL: Within normal limits.  BONES: Within normal limits.    IMPRESSION: Mild hematoma adjacent to the sleeve gastrectomy site. No   evidence of intra-abdominal collection. No evidence of extravasation of   oral contrast from the sleeve gastrectomy.    < end of copied text >  
INTERVAL HPI/OVERNIGHT EVENTS:  seen and examined   nausea with drinking, no vomiting   incisional pain controlled with medication     MEDICATIONS  (STANDING):  diatrizoate meglumine/diatrizoate sodium. 30 milliLiter(s) Oral once  epoetin cyndie-epbx (RETACRIT) Injectable 4000 Unit(s) IV Push <User Schedule>  heparin   Injectable 7500 Unit(s) SubCutaneous every 8 hours  hydrALAZINE Injectable 25 milliGRAM(s) IV Push every 12 hours  labetalol Injectable 10 milliGRAM(s) IV Push every 12 hours  metoclopramide Injectable 10 milliGRAM(s) IV Push <User Schedule>  ondansetron Injectable 4 milliGRAM(s) IV Push <User Schedule>  pantoprazole  Injectable 40 milliGRAM(s) IV Push every 24 hours  sodium chloride 0.9% lock flush 3 milliLiter(s) IV Push every 8 hours  sodium chloride 0.9%. 1000 milliLiter(s) (65 mL/Hr) IV Continuous <Continuous>    MEDICATIONS  (PRN):  acetaminophen   IVPB .. 1000 milliGRAM(s) IV Intermittent every 6 hours PRN Temp greater or equal to 38C (100.4F), Mild Pain (1 - 3)  albuterol    90 MICROgram(s) HFA Inhaler 2 Puff(s) Inhalation every 6 hours PRN Shortness of Breath and/or Wheezing  HYDROmorphone  Injectable 0.5 milliGRAM(s) IV Push every 2 hours PRN Moderate Pain (4 - 6)  HYDROmorphone  Injectable 1 milliGRAM(s) IV Push every 3 hours PRN Severe Pain (7 - 10)  ketorolac   Injectable 15 milliGRAM(s) IV Push every 6 hours PRN Moderate Pain (4 - 6)      Vital Signs Last 24 Hrs  T(C): 37.4 (07 May 2025 05:09), Max: 37.4 (07 May 2025 05:09)  T(F): 99.3 (07 May 2025 05:09), Max: 99.3 (07 May 2025 05:09)  HR: 70 (07 May 2025 05:51) (61 - 86)  BP: 160/82 (07 May 2025 05:51) (132/74 - 184/86)  BP(mean): 123 (07 May 2025 05:09) (102 - 123)  RR: 18 (07 May 2025 05:09) (17 - 20)  SpO2: 98% (07 May 2025 05:09) (96% - 100%)    Parameters below as of 07 May 2025 05:09  Patient On (Oxygen Delivery Method): nasal cannula  O2 Flow (L/min): 2      Physical:  General: NAD.  Respirations: Unlabored  Abdomen: Soft nondistended, appropriate incisional tenderness, steris cdi, No rebound, no guarding, no peritonitis     I&O's Detail      LABS:                        10.0   6.63  )-----------( 127      ( 07 May 2025 07:18 )             28.9             05-07    124[L]  |  88[L]  |  105[H]  ----------------------------<  92  5.2   |  25  |  8.97[H]    Ca    9.0      07 May 2025 07:18  Phos  5.9     05-07  Mg     2.1     05-07            
INTERVAL HPI/OVERNIGHT EVENTS:  seen and examined  febrile 101.1F  complaining of chest pain and shortness of breath  appears distressed with tachypnea and rigors   nausea and multiple episodes of vomiting yesterday  was dialyzed yesterday full session     MEDICATIONS  (STANDING):  acetaminophen   IVPB .. 1000 milliGRAM(s) IV Intermittent once  aztreonam  IVPB      diatrizoate meglumine/diatrizoate sodium. 30 milliLiter(s) Oral once  epoetin cyndie-epbx (RETACRIT) Injectable 4000 Unit(s) IV Push <User Schedule>  heparin   Injectable 7500 Unit(s) SubCutaneous every 8 hours  hydrALAZINE Injectable 25 milliGRAM(s) IV Push every 12 hours  iohexol 300 mG (iodine)/mL Oral Solution 30 milliLiter(s) Oral once  labetalol Injectable 10 milliGRAM(s) IV Push every 12 hours  metoclopramide Injectable 10 milliGRAM(s) IV Push <User Schedule>  metroNIDAZOLE  IVPB      metroNIDAZOLE  IVPB 500 milliGRAM(s) IV Intermittent every 12 hours  ondansetron Injectable 4 milliGRAM(s) IV Push <User Schedule>  pantoprazole  Injectable 40 milliGRAM(s) IV Push every 24 hours  sodium chloride 0.9% lock flush 3 milliLiter(s) IV Push every 8 hours  sodium chloride 0.9%. 1000 milliLiter(s) (65 mL/Hr) IV Continuous <Continuous>    MEDICATIONS  (PRN):  albuterol    90 MICROgram(s) HFA Inhaler 2 Puff(s) Inhalation every 6 hours PRN Shortness of Breath and/or Wheezing  HYDROmorphone  Injectable 1 milliGRAM(s) IV Push every 3 hours PRN Severe Pain (7 - 10)      Vital Signs Last 24 Hrs  T(C): 37.7 (08 May 2025 09:11), Max: 38.4 (08 May 2025 08:00)  T(F): 99.8 (08 May 2025 09:11), Max: 101.1 (08 May 2025 08:00)  HR: 85 (08 May 2025 09:11) (64 - 85)  BP: 134/74 (08 May 2025 09:11) (134/74 - 194/85)  BP(mean): 121 (08 May 2025 05:03) (103 - 121)  RR: 16 (08 May 2025 09:11) (16 - 20)  SpO2: 99% (08 May 2025 09:11) (98% - 100%)    Parameters below as of 08 May 2025 09:11  Patient On (Oxygen Delivery Method): room air        Physical:  General: rigors  skin: warm to touch   Respirations: tachypneic on 2 L NC, no accessory muscle use   Abdomen: Soft nondistended, appropriate incisional tenderness, steris in place cdiNo rebound, no guarding, no peritonitis   lower extremities: no lower extremity edema or calf tenderness b/l     I&O's Detail    07 May 2025 07:01  -  08 May 2025 07:00  --------------------------------------------------------  IN:  Total IN: 0 mL    OUT:    Other (mL): 4000 mL  Total OUT: 4000 mL    Total NET: -4000 mL          LABS:                        10.3   8.49  )-----------( 122      ( 08 May 2025 08:18 )             31.0             05-08    126[L]  |  89[L]  |  75[H]  ----------------------------<  100[H]  5.7[H]   |  24  |  7.81[H]    Ca    9.2      08 May 2025 08:18  Phos  5.9     05-07  Mg     2.1     05-07    TPro  8.4[H]  /  Alb  3.6  /  TBili  0.8  /  DBili  x   /  AST  94[H]  /  ALT  138[H]  /  AlkPhos  229[H]  05-08    
pt s- complaints, mild pain at incision sites, controlled with meds  ICU Vital Signs Last 24 Hrs  T(C): 36.4 (06 May 2025 05:18), Max: 36.6 (05 May 2025 12:33)  T(F): 97.5 (06 May 2025 05:18), Max: 97.9 (05 May 2025 12:33)  HR: 60 (06 May 2025 05:18) (56 - 65)  BP: 146/78 (06 May 2025 05:18) (101/41 - 177/107)  BP(mean): 61 (05 May 2025 21:00) (61 - 129)  ABP: --  ABP(mean): --  RR: 17 (06 May 2025 05:18) (11 - 22)  SpO2: 99% (06 May 2025 05:18) (95% - 100%)    O2 Parameters below as of 06 May 2025 05:18  Patient On (Oxygen Delivery Method): nasal cannula  O2 Flow (L/min): 2    Alert nad  Abd:soft nt nd wounds:clean and dry  no cce                          11.4   5.76  )-----------( 167      ( 06 May 2025 05:40 )             33.4   05-06    120[LL]  |  84[L]  |  173[H]  ----------------------------<  92  6.2[HH]   |  20[L]  |  11.10[H]    Ca    8.9      06 May 2025 05:40        
[facility] : Progress Note :: [phone], (office [phone]),   Dr La / Dr Hui / Dr Barber / Dr Villalobos / Dr Hang CASTELLANO / Dr Mehta / Dr Sanchez / Dr Alber dumont  _____________________________________________________________________________________________  events noted. code sepsis was called for fever and tachycardia. fro CTA to R/O PE    shellfish (Rash)  [place] (Anaphylaxis)  penicillins (Swelling)  penicillin (Anaphylaxis)  fish (Unknown)  shellfish (Anaphylaxis)  aspirin (Swelling)  Seafood (Hives)    Hospital Medications:   MEDICATIONS  (STANDING):  acetaminophen   IVPB .. 1000 milliGRAM(s) IV Intermittent once  cefepime   IVPB 2000 milliGRAM(s) IV Intermittent once  diatrizoate meglumine/diatrizoate sodium. 30 milliLiter(s) Oral once  epoetin cyndie-epbx (RETACRIT) Injectable 4000 Unit(s) IV Push <User Schedule>  heparin   Injectable 7500 Unit(s) SubCutaneous every 8 hours  hydrALAZINE Injectable 25 milliGRAM(s) IV Push every 12 hours  iohexol 300 mG (iodine)/mL Oral Solution 30 milliLiter(s) Oral once  labetalol Injectable 10 milliGRAM(s) IV Push every 12 hours  metoclopramide Injectable 10 milliGRAM(s) IV Push <User Schedule>  metroNIDAZOLE  IVPB      metroNIDAZOLE  IVPB 500 milliGRAM(s) IV Intermittent every 12 hours  ondansetron Injectable 4 milliGRAM(s) IV Push <User Schedule>  pantoprazole  Injectable 40 milliGRAM(s) IV Push every 24 hours  sodium chloride 0.9% lock flush 3 milliLiter(s) IV Push every 8 hours  sodium chloride 0.9%. 1000 milliLiter(s) (65 mL/Hr) IV Continuous <Continuous>        VITALS:  T(F): 99.8 (05-08-25 @ 09:11), Max: 101.1 (05-08-25 @ 08:00)  HR: 85 (05-08-25 @ 09:11)  BP: 134/74 (05-08-25 @ 09:11)  RR: 16 (05-08-25 @ 09:11)  SpO2: 99% (05-08-25 @ 09:11)  Wt(kg): --    05-07 @ 07:01  -  05-08 @ 07:00  --------------------------------------------------------  IN: 0 mL / OUT: 4000 mL / NET: -4000 mL        PHYSICAL EXAM:  Constitutional: NAD  HEENT: anicteric sclera, oropharynx clear  Neck: No JVD  Respiratory: CTAB, no wheezes, rales or rhonchi  Cardiovascular: S1, S2, RRR  Gastrointestinal: BS+, soft, NT/ND  Extremities: . No peripheral edema  Neurological: A/O x 3, no focal deficits  Vascular Access: AVF with thrill and bruit     LABS:  05-08    126[L]  |  89[L]  |  75[H]  ----------------------------<  100[H]  5.7[H]   |  24  |  7.81[H]    Ca    9.2      08 May 2025 08:18  Phos  5.9     05-07  Mg     2.1     05-07    TPro  8.4[H]  /  Alb  3.6  /  TBili  0.8  /  DBili      /  AST  94[H]  /  ALT  138[H]  /  AlkPhos  229[H]  05-08    Creatinine Trend: 7.81 <--, 8.97 <--, 11.10 <--, 9.33 <--                        10.3   8.49  )-----------( 122      ( 08 May 2025 08:18 )             31.0     Urine Studies:  Urinalysis Basic - ( 08 May 2025 08:18 )    Color:  / Appearance:  / SG:  / pH:   Gluc: 100 mg/dL / Ketone:   / Bili:  / Urobili:    Blood:  / Protein:  / Nitrite:    Leuk Esterase:  / RBC:  / WBC    Sq Epi:  / Non Sq Epi:  / Bacteria:         RADIOLOGY & ADDITIONAL STUDIES:  
Surgery  s/p sleeve gastrectomy 5/5    Subjective:  Pt resting comfortably. No acute complaints  Tolerating pain with meds. Nausea controlled with meds. No vomiting.   Kept NPO  Pt does not make urine. Pt has not ambulated.     T(C): 36.4 (05-05-25 @ 15:32), Max: 37 (05-05-25 @ 08:15)  HR: 60 (05-05-25 @ 15:32) (56 - 65)  BP: 127/58 (05-05-25 @ 15:32) (122/71 - 177/107)  RR: 16 (05-05-25 @ 15:32) (11 - 22)  SpO2: 100% (05-05-25 @ 15:32) (95% - 100%)    Physical:  Gen: A&O x3  Resp: Unlabored breathing. Equal chest rise bilaterally.   Abd: Soft ND, incisional tenderness. Dressing C/D/I. No peritoneal signs.  Extremities: +palpable thrill RUE. LE No calf tenderness b/l. SCDs in place.   Skin: Warm and dry.   
[facility] : Progress Note :: [phone], (office [phone]),   Dr La / Dr Hui / Dr Barber / Dr Villalobos / Dr Hang CASTELLANO / Dr Mehta / Dr Sanchez / Dr Alber dumont  _____________________________________________________________________________________________    s/p HD earlier today.    shellfish (Rash)  [place] (Anaphylaxis)  penicillins (Swelling)  penicillin (Anaphylaxis)  fish (Unknown)  shellfish (Anaphylaxis)  aspirin (Swelling)  Seafood (Hives)    Hospital Medications:   MEDICATIONS  (STANDING):  diatrizoate meglumine/diatrizoate sodium. 30 milliLiter(s) Oral once  epoetin cyndie-epbx (RETACRIT) Injectable 4000 Unit(s) IV Push <User Schedule>  heparin   Injectable 7500 Unit(s) SubCutaneous every 8 hours  hydrALAZINE Injectable 25 milliGRAM(s) IV Push every 12 hours  labetalol Injectable 10 milliGRAM(s) IV Push every 12 hours  metoclopramide Injectable 10 milliGRAM(s) IV Push <User Schedule>  ondansetron Injectable 4 milliGRAM(s) IV Push <User Schedule>  pantoprazole  Injectable 40 milliGRAM(s) IV Push every 24 hours  sodium chloride 0.9% lock flush 3 milliLiter(s) IV Push every 8 hours  sodium chloride 0.9%. 1000 milliLiter(s) (65 mL/Hr) IV Continuous <Continuous>      VITALS:  T(F): 98.4 (05-07-25 @ 09:48), Max: 99.3 (05-07-25 @ 05:09)  HR: 69 (05-07-25 @ 18:03)  BP: 182/86 (05-07-25 @ 18:03)  RR: 20 (05-07-25 @ 09:48)  SpO2: 99% (05-07-25 @ 16:15)  Wt(kg): --    05-07 @ 07:01  -  05-07 @ 20:32  --------------------------------------------------------  IN: 0 mL / OUT: 4000 mL / NET: -4000 mL        PHYSICAL EXAM:  Constitutional: NAD  HEENT: anicteric sclera, oropharynx clear.  Neck: No JVD  Respiratory: CTAB, no wheezes, rales or rhonchi  Cardiovascular: S1, S2, RRR  Gastrointestinal: BS+, soft, NT/ND  Extremities:  No peripheral edema  Neurological: A/O x 3, no focal deficits.   Vascular Access: AVF with thrill and bruit     LABS:  05-07    124[L]  |  88[L]  |  105[H]  ----------------------------<  92  5.2   |  25  |  8.97[H]    Ca    9.0      07 May 2025 07:18  Phos  5.9     05-07  Mg     2.1     05-07      Creatinine Trend: 8.97 <--, 11.10 <--, 9.33 <--                        10.0   6.63  )-----------( 127      ( 07 May 2025 07:18 )             28.9     Urine Studies:  Urinalysis Basic - ( 07 May 2025 07:18 )    Color:  / Appearance:  / SG:  / pH:   Gluc: 92 mg/dL / Ketone:   / Bili:  / Urobili:    Blood:  / Protein:  / Nitrite:    Leuk Esterase:  / RBC:  / WBC    Sq Epi:  / Non Sq Epi:  / Bacteria:         RADIOLOGY & ADDITIONAL STUDIES:  
Source of information: PRESTON JOHNSON, Chart review  Patient language: English  : n/a    HPI:  44year old morbidly obese male with pmhx of anxiety, bipolar disorder, schizophrenia, ESRD on HD (M,W, F), asthma with COPD, hypertension, hyperlipidemia, migraine and cholecystitis presents with c/o failure to loose despite several dietary modalities, calorie reduction, portion control. Patient is here today for presurgical testing for scheduled Robotic Assisted Laparoscopic Sleeve Gastrectomy possible open on 4/28/2025 (16 Apr 2025 16:41)    44 y/o male s/p sleeve gastrectomy POD#4, code sepsis 5/8, febrile to 101F on 5/8   Afebrile 5/9, WBC WNL  V/Q scan neg for PE 5/8  CTAP shows Mild hematoma 5.8 x 2.7 cm hyperattenuating   collection adjacent to the sleeve gastrectomy site. NO leak    Pt is admitted for scheduled gastrectomy surgery. Pain consulted for Post-operative abdominal pain 5/7. Pt is s/p Robotic Assisted Laparoscopic Sleeve Gastrectomy on 5/5. POD#4. Pt seen and examined at bedside this morning. Pt found sitting up in bed, sleeping, answered to verbal stimuli, alert and oriented x3. Code sepsis called yesterday 5/8, pt afebrile overnight. Followed by Surgery Team. Pt observed resting, no acute distress noted, on 2L NC. Pt continues to reports pain at incision site, score 10/10 currently, pt noted somnolent and no facial grimacing noted. SCALE USED: (1-10 VNRS). Pt describes pain as aching and throbbing through abdomen, non-radiating and minimally alleviated by pain medication and exacerbated by movement. Pt tolerating bariatric clear liquid diet, no pills yet. Denies lethargy, vomiting, constipation, itchiness. Patient stated goal for pain control: to be able to take deep breaths, get out of bed to chair and ambulate with tolerable pain control. Pt denies taking medications for pain at home. Pt seen with PT, ambulated in unit with tolerable pain control. Pt instructed to use pain meds as needed for pain control.    PAST MEDICAL & SURGICAL HISTORY:  Morbid obesity with BMI of 40.0-44.9, adult    ESRD on dialysis    Bipolar disorder    DM (diabetes mellitus)    HTN (hypertension)    Migraine    COPD (chronic obstructive pulmonary disease)    Renal failure    Asthma with COPD    Diabetes mellitus, type 2    Hypertension    Schizophrenia    Anxiety    Schizophrenia    COPD (chronic obstructive pulmonary disease)    HTN (hypertension)    ESRD on dialysis    HLD (hyperlipidemia)    History of cholecystitis    Smokes tobacco daily    H/O hernia repair    S/P arteriovenous (AV) fistula creation  right side    History of cholecystectomy    FAMILY HISTORY:  FH: lymphoma (Sibling)    FH: rheumatoid arthritis (Sibling)    Social History:   [x] Denies ETOH use, illicit drug use   + daily smoking tobacco  + socially drinking    Allergies    shellfish (Rash)  [place] (Anaphylaxis)  penicillins (Swelling)  penicillin (Anaphylaxis)  fish (Unknown)  shellfish (Anaphylaxis)  aspirin (Swelling)  Seafood (Hives)    Intolerances    MEDICATIONS  (STANDING):  cefepime   IVPB 1000 milliGRAM(s) IV Intermittent every 24 hours  diatrizoate meglumine/diatrizoate sodium. 30 milliLiter(s) Oral once  epoetin cyndie-epbx (RETACRIT) Injectable 4000 Unit(s) IV Push <User Schedule>  heparin   Injectable 7500 Unit(s) SubCutaneous every 8 hours  hydrALAZINE Injectable 25 milliGRAM(s) IV Push every 12 hours  labetalol Injectable 10 milliGRAM(s) IV Push every 12 hours  metoclopramide Injectable 10 milliGRAM(s) IV Push <User Schedule>  metroNIDAZOLE  IVPB 500 milliGRAM(s) IV Intermittent every 12 hours  metroNIDAZOLE  IVPB      ondansetron Injectable 4 milliGRAM(s) IV Push <User Schedule>  pantoprazole  Injectable 40 milliGRAM(s) IV Push every 24 hours  sodium chloride 0.9% lock flush 3 milliLiter(s) IV Push every 8 hours  sodium chloride 0.9%. 1000 milliLiter(s) (65 mL/Hr) IV Continuous <Continuous>    MEDICATIONS  (PRN):  albuterol    90 MICROgram(s) HFA Inhaler 2 Puff(s) Inhalation every 6 hours PRN Shortness of Breath and/or Wheezing  lidocaine   4% Patch 2 Patch Transdermal daily PRN Abdominal incisional pain    Vital Signs Last 24 Hrs  T(C): 36.9 (09 May 2025 10:35), Max: 37.3 (08 May 2025 16:42)  T(F): 98.5 (09 May 2025 10:35), Max: 99.1 (08 May 2025 16:42)  HR: 71 (09 May 2025 10:35) (70 - 82)  BP: 148/71 (09 May 2025 10:35) (119/70 - 183/86)  BP(mean): 96 (09 May 2025 10:35) (96 - 103)  RR: 18 (09 May 2025 10:35) (18 - 20)  SpO2: 99% (09 May 2025 10:35) (98% - 100%)    Parameters below as of 09 May 2025 10:35  Patient On (Oxygen Delivery Method): nasal cannula  O2 Flow (L/min): 2    LABS: Reviewed                        9.9    6.80  )-----------( 122      ( 09 May 2025 06:10 )             29.9     05-09    128[L]  |  93[L]  |  52[H]  ----------------------------<  94  4.7   |  25  |  6.88[H]    Ca    9.7      09 May 2025 06:10    TPro  8.4[H]  /  Alb  3.6  /  TBili  0.8  /  DBili  x   /  AST  94[H]  /  ALT  138[H]  /  AlkPhos  229[H]  05-08    PT/INR - ( 08 May 2025 08:18 )   PT: 12.8 sec;   INR: 1.10 ratio      PTT - ( 08 May 2025 08:18 )  PTT:37.2 sec  LIVER FUNCTIONS - ( 08 May 2025 08:18 )  Alb: 3.6 g/dL / Pro: 8.4 g/dL / ALK PHOS: 229 U/L / ALT: 138 U/L DA / AST: 94 U/L / GGT: x           Urinalysis Basic - ( 09 May 2025 06:10 )    Color: x / Appearance: x / SG: x / pH: x  Gluc: 94 mg/dL / Ketone: x  / Bili: x / Urobili: x   Blood: x / Protein: x / Nitrite: x   Leuk Esterase: x / RBC: x / WBC x   Sq Epi: x / Non Sq Epi: x / Bacteria: x    CAPILLARY BLOOD GLUCOSE    Radiology: Reviewed  ACC: [id number] EXAM:  NM PULM VENTILATION PERFUS IMG   ORDERED BY:   MARI OLSON     PROCEDURE DATE:  05/08/2025      INTERPRETATION:  CLINICAL INFORMATION: Shortness of breath. Evaluate for   pulmonary embolus.    RADIOPHARMACEUTICAL: 1 mCi Tc-99m-DTPA via aerosol;  6.5 mCi Tc-99m-MAA,   I.V.    TECHNIQUE:  Ventilation and perfusion images of the lungs were obtained   following administration of Tc-99m-DTPA and Tc-99m-MAA. Images were   obtained in the anterior, posterior, both lateral, and all 4 oblique   views. The study was interpreted in conjunction with chest radiograph of   5/8/2025.    COMPARISON: None    OTHER STUDIES USED FOR CORRELATION: None    FINDINGS: There is heterogeneous distribution of radiopharmaceuticals in   both lungs on the ventilation and perfusion images. There is no segmental   perfusion defect.    IMPRESSION: Very low probability of pulmonary embolus.    --- End of Report ---    FAUSTO TAFOYA MD; Attending Radiologist  This document hasbeen electronically signed. May  8 2025  4:47PM  ACC: [id number] EXAM:  CT ABDOMEN ONLY OC   ORDERED BY:  MATY ASIF     PROCEDURE DATE:  05/08/2025      INTERPRETATION:  CLINICAL INFORMATION: Postoperative day 3 sleeve   gastrectomy. Sepsis.    COMPARISON: Preoperative noncontrast CT of the abdomen and pelvis   February 7, 2025    CONTRAST/COMPLICATIONS:  IV Contrast: NONE  Oral Contrast: Omnipaque 300    PROCEDURE:  CT of the Abdomen was performed.  Sagittal and coronal reformats were performed.    FINDINGS:  LOWER CHEST: And trace layering left pleural fluid and mild left lower   lobe dependent atelectasis.    LIVER: Within normal limits.  BILE DUCTS: Normal caliber.  GALLBLADDER: Removed.  SPLEEN: The spleen remains prominent to 14.8 cm in maximal axial   dimension.. Trace free fluid in the left upper quadrant.  PANCREAS: Within normal limits.  ADRENALS: Within normal limits.  KIDNEYS/URETERS: Is severely atrophic kidneys.    VISUALIZED PORTIONS:  BOWEL: Sleeve gastrectomy. There is a 5.8 x 2.7 cm hyperattenuating   collection adjacent to the surgical margin on image 42, compatible with   mild hematoma. Oral contrast in the esophagus, gastric cardia, gastric   antrum, and duodenal sweep. Contrast in the right colon, related to   esophagram performed May 6. No extravasation of oral contrast is   identified.  PERITONEUM/RETROPERITONEUM: Within normal limits.  VESSELS: Within normal limits.  LYMPH NODES: No lymphadenopathy.  ABDOMINAL WALL: Within normal limits.  BONES: Within normal limits.    IMPRESSION: Mild hematoma adjacent to the sleeve gastrectomy site. No   evidence of intra-abdominal collection. No evidence of extravasation of   oral contrast from the sleeve gastrectomy.    --- End of Report ---    NOAH MALONE MD; Attending Radiologist  This document has been electronically signed. May  8 2025  2:56PM    ORT Score -   Family Hx of substance abuse	Female	      Male  Alcohol 	                                           1                     3  Illegal drugs	                                   2                     3  Rx drugs                                           4 	                  4  Personal Hx of substance abuse		  Alcohol 	                                          3	                  3  Illegal drugs                                     4	                  4  Rx drugs                                            5 	                  5  Age between 16- 45 years	           1                     1  hx preadolescent sexual abuse	   3 	                  0  Psychological disease		  ADD, OCD, bipolar, schizophrenia   2	          2  Depression                                           1 	          1  Total: 3    a score of 3 or lower indicates low risk for opioid abuse		  a score of 4-7 indicates moderate risk for opioid abuse		  a score of 8 or higher indicates high risk for opioid abuse    REVIEW OF SYSTEMS:  CONSTITUTIONAL: No fever , + fatigue  HEENT:  No difficulty hearing, no change in vision  NECK: No pain or stiffness  RESPIRATORY: No cough, wheezing, chills or hemoptysis; shortness of breath  CARDIOVASCULAR: No chest pain, palpitations, dizziness, or leg swelling  GASTROINTESTINAL: No loss of appetite, decreased PO intake. + abdominal  pain. No nausea, no vomiting; No diarrhea or constipation.   GENITOURINARY: No dysuria, frequency, hematuria, retention or incontinence  MUSCULOSKELETAL: No joint pain or swelling; No muscle, back, or extremity pain, no upper or lower motor strength weakness, no saddle anesthesia, bowel/bladder incontinence, no falls   NEURO: No headaches, No numbness/tingling b/l LE, No weakness  ENDOCRINE: No polyuria, polydipsia, heat or cold intolerance; No hair loss  PSYCHIATRIC: No depression, + anxiety and Schizophrenia, no difficulty sleeping    PHYSICAL EXAM:  GENERAL:  Alert & Oriented X4, cooperative, NAD, Good concentration. Speech is clear.   RESPIRATORY: Pt Tachypneic, noted using accessory muscles. No rales, rhonchi, wheezing, or rubs. 2L NC  CARDIOVASCULAR: Normal S1/S2, regular rate and rhythm; No murmurs, rubs, or gallops. No JVD.   GASTROINTESTINAL:  Soft, + tender 2/2 incisional pain, Nondistended; Bowel sounds present, morbid obese, steri strips x 8 in place, CDI  PERIPHERAL VASCULAR:  Extremities warm without edema. 2+ Peripheral Pulses, No cyanosis, No calf tenderness. BUE with AV fistula, +bruit +thrill  MUSCULOSKELETAL: Motor Strength 5/5 B/L upper and lower extremities; moves all extremities equally against gravity; ROM intact; negative SLR; No tenderness on palpation of all joints.   SKIN: Warm, dry, intact. No rashes, lesions, or wounds. + Abdomen, Left arm, right chest scar, dark pigmentation to BLE    Risk factors associated with adverse outcomes related to opioid treatment  [ ]  Concurrent benzodiazepine use  [ ]  History/ Active substance use or alcohol use disorder  [x] Psychiatric co-morbidity  [ ] Sleep apnea  [x] COPD  [x] BMI> 35  [ ] Liver dysfunction  [x] Renal dysfunction  [ ] CHF  [x] Smoker  [ ]  Age > 60 years    [x]  [place]  Reviewed and Copied to Chart. See below.    Plan of care and goal oriented pain management treatment options were discussed with patient and /or primary care giver; all questions and concerns were addressed and care was aligned with patient's wishes.    Educated patient on goal oriented pain management treatment options     05-09-25 @ 12:33    
Source of information: PRESTON JOHNSON, Chart review  Patient language: English  : n/a    HPI:  44year old morbidly obese male with pmhx of anxiety, bipolar disorder, schizophrenia, ESRD on HD (M,W, F), asthma with COPD, hypertension, hyperlipidemia, migraine and cholecystitis presents with c/o failure to loose despite several dietary modalities, calorie reduction, portion control. Patient is here today for presurgical testing for scheduled Robotic Assisted Laparoscopic Sleeve Gastrectomy possible open on 4/28/2025 (16 Apr 2025 16:41)    Pt is admitted for scheduled gastrectomy surgery. Pain consulted for Post-operative abdominal pain 5/7. Pt is s/p Robotic Assisted Laparoscopic Sleeve Gastrectomy on 5/5. POD#3. Pt seen and examined at bedside. Pt found sitting up in bed, awake, alert and oriented x3. Code sepsis called this morning 5/8, pt febrile to 101F and with chest pain and tachypnea. Followed by Surgery Team. Pt observed tachypneic, on 2L NC. Pt reports pain score 10/10 currently. SCALE USED: (1-10 VNRS). Pt describes pain as aching and throbbing through abdomen, non-radiating and minimally alleviated by pain medication and exacerbated by movement. Pt tolerating bariatric clear liquid diet, no pills yet. Denies lethargy, vomiting, constipation, itchiness. Patient stated goal for pain control: to be able to take deep breaths, get out of bed to chair and ambulate with tolerable pain control. Pt denies taking medications for pain at home. Pt seen with PT yesterday, ambulating in unit with tolerable pain control. Pt instructed to use pain meds as needed for pain control.    PAST MEDICAL & SURGICAL HISTORY:  Morbid obesity with BMI of 40.0-44.9, adult    ESRD on dialysis    Bipolar disorder    DM (diabetes mellitus)    HTN (hypertension)    Migraine    COPD (chronic obstructive pulmonary disease)    Renal failure    Asthma with COPD    Diabetes mellitus, type 2    Hypertension    Schizophrenia    Anxiety    Schizophrenia    COPD (chronic obstructive pulmonary disease)    HTN (hypertension)    ESRD on dialysis    HLD (hyperlipidemia)    History of cholecystitis    Smokes tobacco daily    H/O hernia repair    S/P arteriovenous (AV) fistula creation  right side    History of cholecystectomy    FAMILY HISTORY:  FH: lymphoma (Sibling)    FH: rheumatoid arthritis (Sibling)    Social History:  [x] Denies ETOH use, illicit drug use,   + daily smoking tobacco  + socially drinking    Allergies    shellfish (Rash)  [place] (Anaphylaxis)  penicillins (Swelling)  penicillin (Anaphylaxis)  fish (Unknown)  shellfish (Anaphylaxis)  aspirin (Swelling)  Seafood (Hives)    Intolerances    MEDICATIONS  (STANDING):  acetaminophen   IVPB .. 1000 milliGRAM(s) IV Intermittent once  cefepime   IVPB 2000 milliGRAM(s) IV Intermittent once  diatrizoate meglumine/diatrizoate sodium. 30 milliLiter(s) Oral once  epoetin cyndie-epbx (RETACRIT) Injectable 4000 Unit(s) IV Push <User Schedule>  heparin   Injectable 7500 Unit(s) SubCutaneous every 8 hours  hydrALAZINE Injectable 25 milliGRAM(s) IV Push every 12 hours  iohexol 300 mG (iodine)/mL Oral Solution 30 milliLiter(s) Oral once  labetalol Injectable 10 milliGRAM(s) IV Push every 12 hours  metoclopramide Injectable 10 milliGRAM(s) IV Push <User Schedule>  metroNIDAZOLE  IVPB      metroNIDAZOLE  IVPB 500 milliGRAM(s) IV Intermittent every 12 hours  ondansetron Injectable 4 milliGRAM(s) IV Push <User Schedule>  pantoprazole  Injectable 40 milliGRAM(s) IV Push every 24 hours  sodium chloride 0.9% lock flush 3 milliLiter(s) IV Push every 8 hours  sodium chloride 0.9%. 1000 milliLiter(s) (65 mL/Hr) IV Continuous <Continuous>    MEDICATIONS  (PRN):  albuterol    90 MICROgram(s) HFA Inhaler 2 Puff(s) Inhalation every 6 hours PRN Shortness of Breath and/or Wheezing  HYDROmorphone  Injectable 1 milliGRAM(s) IV Push every 3 hours PRN Severe Pain (7 - 10)    Vital Signs Last 24 Hrs  T(C): 36.8 (08 May 2025 12:42), Max: 38.4 (08 May 2025 08:00)  T(F): 98.3 (08 May 2025 12:42), Max: 101.1 (08 May 2025 08:00)  HR: 70 (08 May 2025 12:42) (64 - 85)  BP: 166/89 (08 May 2025 12:42) (134/74 - 194/85)  BP(mean): 121 (08 May 2025 05:03) (103 - 121)  RR: 18 (08 May 2025 12:42) (16 - 34)  SpO2: 99% (08 May 2025 12:42) (98% - 100%)    Parameters below as of 08 May 2025 12:42  Patient On (Oxygen Delivery Method): nasal cannula  O2 Flow (L/min): 2    LABS: Reviewed                        10.3   8.49  )-----------( 122      ( 08 May 2025 08:18 )             31.0     05-08    126[L]  |  89[L]  |  75[H]  ----------------------------<  100[H]  5.7[H]   |  24  |  7.81[H]    Ca    9.2      08 May 2025 08:18  Phos  5.9     05-07  Mg     2.1     05-07    TPro  8.4[H]  /  Alb  3.6  /  TBili  0.8  /  DBili  x   /  AST  94[H]  /  ALT  138[H]  /  AlkPhos  229[H]  05-08    PT/INR - ( 08 May 2025 08:18 )   PT: 12.8 sec;   INR: 1.10 ratio      PTT - ( 08 May 2025 08:18 )  PTT:37.2 sec  LIVER FUNCTIONS - ( 08 May 2025 08:18 )  Alb: 3.6 g/dL / Pro: 8.4 g/dL / ALK PHOS: 229 U/L / ALT: 138 U/L DA / AST: 94 U/L / GGT: x           Urinalysis Basic - ( 08 May 2025 08:18 )    Color: x / Appearance: x / SG: x / pH: x  Gluc: 100 mg/dL / Ketone: x  / Bili: x / Urobili: x   Blood: x / Protein: x / Nitrite: x   Leuk Esterase: x / RBC: x / WBC x   Sq Epi: x / Non Sq Epi: x / Bacteria: x    CAPILLARY BLOOD GLUCOSE    Radiology: Reviewed    ACC: [id number] EXAM:  CT ABDOMEN AND PELVIS   ORDERED BY: ANNE ESCOBAR     PROCEDURE DATE:  02/07/2025      INTERPRETATION:  CLINICAL INFORMATION: Back pain, small left flank   hematoma    COMPARISON: None.    CONTRAST/COMPLICATIONS:  IV Contrast: NONE  Oral Contrast: NONE    PROCEDURE:  CT of the Abdomen and Pelvis was performed.  Sagittal and coronal reformats were performed.    FINDINGS:  LOWER CHEST: Trace bibasilar atelectasis. Punctate granuloma at the right   lung base.    LIVER: Within normal limits.  BILE DUCTS: Normal caliber.  GALLBLADDER: Cholecystectomy. Small amount of fluid in the gallbladder   fossa as well as a 1 cm density in the gallbladder fossa, unchanged.  SPLEEN: Splenomegaly.  PANCREAS: Within normal limits.  ADRENALS: Within normal limits.  KIDNEYS/URETERS: Atrophic kidneys.    BLADDER: Minimally distended.  REPRODUCTIVE ORGANS: Calcifications within the prostate gland    BOWEL: No bowel obstruction. Appendix is normal.  PERITONEUM/RETROPERITONEUM: Within normal limits.  VESSELS: Atherosclerotic changes.  LYMPH NODES: Subcentimeter retroperitoneal nodes, nonspecific.  ABDOMINAL WALL: Tiny fat-containing umbilical hernia. Post hernia mesh   repair.  BONES: Within normal limits.    IMPRESSION:  Post cholecystectomy with small fluid and 1 cm hyperdensity in the   cholecystectomy bed, unchanged.    Additional incidental findings as above.    --- End of Report ---    AMBROSIO HUA MD; Attending Radiologist  This document has been electronically signed. Feb 7 2025 11:17AM  ACC: [id number] EXAM:  XR ESOPH SNGL CON STUDY   ORDERED BY: FABIO CRUZ     PROCEDURE DATE:  05/06/2025      INTERPRETATION:  Single contrast esophagram    INDICATION: Status post sleeve gastrectomy. Rule out leak.    TECHNIQUE: Single supine AP  view of the abdomen is acquired prior   to the procedure. Single contrast esophagram is then performed using   Omnipaque 350.    COMPARISON: None.    FINDINGS: Contrast passes through the esophagus, GE junction and into the   gastric pouch without evidence for an esophageal stricture, or mass   lesion. No evidence for a hiatal hernia. Esophageal spasm is noted with   associated intraesophageal reflux.    Contrast passes through the gastric sleeve freely. There is no evidence   for oral contrast extravasation.    IMPRESSION: No evidence for oral contrast extravasation.    Esophageal spasm with associated intraesophageal reflux.    --- End of Report ---    ERICK BOWERS MD; Attending Radiologist  This document has been electronically signed. May  6 2025 10:16AM  ACC: [id number] EXAM:  XR CHEST AP OR PA 1V   ORDERED BY: MIRIAN DICKEY     PROCEDURE DATE:  05/08/2025      INTERPRETATION:  TIME OF EXAM: May 8, 2025 at 9:13 AM and 9:14 AM.    CLINICAL INFORMATION: Status post bariatric sleeve. Code sepsis.    COMPARISON:  February 7, 2025.    TECHNIQUE:   AP Portable chest x-ray. Inferior costophrenic angles are   excluded from the images.    INTERPRETATION:    Heart size and the mediastinum cannot be accurately evaluated on this   projection.  Surgical clips project over the left side of the neck.  Incompletely imaged left axillary/subclavian vascular stent area.  Limited evaluation of left lower/retrocardiac region due to   underpenetration and overlying cardiac silhouette. No definite focal lung   consolidation.  No pleural effusion or pneumothorax is seen.  There is no acute bony abnormality.    IMPRESSION:  Inferior costophrenic angles excluded from the images.   Limited evaluation of left lower/retrocardiac region. No definite focal   lung consolidation.    --- End of Report ---    MINNIE JOE MD; Attending Radiologist  This document has been electronically signed. May  8 2025 11:32AM    ORT Score -   Family Hx of substance abuse	Female	      Male  Alcohol 	                                           1                     3  Illegal drugs	                                   2                     3  Rx drugs                                           4 	                  4  Personal Hx of substance abuse		  Alcohol 	                                          3	                  3  Illegal drugs                                     4	                  4  Rx drugs                                            5 	                  5  Age between 16- 45 years	           1                     1  hx preadolescent sexual abuse	   3 	                  0  Psychological disease		  ADD, OCD, bipolar, schizophrenia   2	          2  Depression                                           1 	          1  Total: 3    a score of 3 or lower indicates low risk for opioid abuse		  a score of 4-7 indicates moderate risk for opioid abuse		  a score of 8 or higher indicates high risk for opioid abuse    REVIEW OF SYSTEMS:  CONSTITUTIONAL: + fever , fatigue  HEENT:  No difficulty hearing, no change in vision  NECK: No pain or stiffness  RESPIRATORY: No cough, wheezing, chills or hemoptysis; + shortness of breath  CARDIOVASCULAR: No chest pain, palpitations, dizziness, or leg swelling  GASTROINTESTINAL: No loss of appetite, decreased PO intake. + abdominal  pain. No nausea, no vomiting; No diarrhea or constipation.   GENITOURINARY: No dysuria, frequency, hematuria, retention or incontinence  MUSCULOSKELETAL: No joint pain or swelling; No muscle, back, or extremity pain, no upper or lower motor strength weakness, no saddle anesthesia, bowel/bladder incontinence, no falls   NEURO: No headaches, No numbness/tingling b/l LE, No weakness  ENDOCRINE: No polyuria, polydipsia, heat or cold intolerance; No hair loss  PSYCHIATRIC: No depression, + anxiety and Schizophrenia, no difficulty sleeping    PHYSICAL EXAM:  GENERAL:  Alert & Oriented X4, cooperative, NAD, Good concentration. Speech is clear.   RESPIRATORY: Pt Tachypneic, noted using accessory muscles. No rales, rhonchi, wheezing, or rubs. 2L NC  CARDIOVASCULAR: Normal S1/S2, regular rate and rhythm; No murmurs, rubs, or gallops. No JVD.   GASTROINTESTINAL:  Soft, + tender 2/2 incisional pain, Nondistended; Bowel sounds present, morbid obese, steri strips x 8 in place, CDI  PERIPHERAL VASCULAR:  Extremities warm without edema. 2+ Peripheral Pulses, No cyanosis, No calf tenderness. BUE with AV fistula, +bruit +thrill  MUSCULOSKELETAL: Motor Strength 5/5 B/L upper and lower extremities; moves all extremities equally against gravity; ROM intact; negative SLR; No tenderness on palpation of all joints.   SKIN: Warm, dry, intact. No rashes, lesions, or wounds. + Abdomen, Left arm, right chest scar, dark pigmentation to BLE    Risk factors associated with adverse outcomes related to opioid treatment  [ ]  Concurrent benzodiazepine use  [ ]  History/ Active substance use or alcohol use disorder  [x] Psychiatric co-morbidity  [ ] Sleep apnea  [x] COPD  [x] BMI> 35  [ ] Liver dysfunction  [x] Renal dysfunction  [ ] CHF  [x] Smoker  [ ]  Age > 60 years    [x]  [place]  Reviewed and Copied to Chart. See below.    Plan of care and goal oriented pain management treatment options were discussed with patient and /or primary care giver; all questions and concerns were addressed and care was aligned with patient's wishes.    Educated patient on goal oriented pain management treatment options     05-08-25 @ 12:44

## 2025-05-09 NOTE — PROGRESS NOTE ADULT - ASSESSMENT
46 y/o male s/p sleeve gastrectomy POD0     postop stable    Plan   - NPO + IVF  - UGI in AM  - pain / nausea control  - CPAP ordered  - continuous pulse ox / telemetry    - DVT ppx  - IS  - encourage ambulation / OOB   - neph recs appreciated   
Patient is a 45y Male whom presented to the hospital for elective sleeve gastrectomy. He has H/O ESRD on HD at [facility]. Last week had extra on Saturday to optimize for surgery.  s/p sleeve gastrectomy. Labs noted for hyperkalemia and hyponatremia. s/p HD this morning. Complains surgical site pain.    # ESRD s/p HD Tuesday and this morning . renal diet. fluid restriction 1.2L /day. daily BMP  dose meds ofr ESRD  # hyperkalemia- improved after HD.  # hyponatremia in ESRD, fluid restriction 1.2l/day  # anemia of CKD. HGB at goal- monitor off SIOBHAN  # renal osteodystrophy- holding binders post surgery  
Patient is a 45y Male whom presented to the hospital for elective sleeve gastrectomy. He has H/O ESRD on HD at [facility]. Last week had extra on Saturday to optimize for surgery.  s/p sleeve gastrectomy. Labs noted for hyperkalemia and hyponatremia. s/p HD this morning. Complains surgical site pain.    # ESRD s/p 2 sessions of HD. HD again today as will ge tIV contrast and with hyperkalemia   # post-op fever. septic work-up ongoing . on ABX  # hyponatremia in ESRD, fluid restriction 1.2l/day  # anemia of CKD. HGB at goal- monitor off SIOBHAN  # renal osteodystrophy- holding binders post surgery  
44 y/o male s/p sleeve gastrectomy POD#3, code sepsis 5/8, febrile to 101F and with chest pain and tachypnea. EKG reviewed  -med recs/code sepsis recs appreciated  -f/u cta pe protocol, f/u ct a/p with PO con  -tele  -iv abx: azetronam and flagyl, will consult ID  -blood cultures  -cxr  -f/u trops, trend   -nephro recs appreciated, continue hd per renal   -trend fever   -discuss with attending 
44 y/o male s/p sleeve gastrectomy POD#4, code sepsis 5/8, febrile to 101F on 5/8   Afebrile 5/9, WBC WNL  V/Q scan neg for PE 5/8  CTAP shows Mild hematoma 5.8 x 2.7 cm hyperattenuating   collection adjacent to the sleeve gastrectomy site. NO leak    -tele  -iv abx: azetronam and flagyl  -blood cultures  -nephro recs appreciated, continue hd per renal   -trend fever   -discuss with attending   
46 y/o male s/p RA sleeve gastrectomy 5/5, pmhx COPD/KRYSTIN, ESRD  -bariatric clear liquid diet  -pain control  -anti-emetic  -monitor/correct lytes   -oob/ambulation  -hd per nephro   -dc planning  -discussed with  
pod1 sleeve gastrectemy  hyperkalemia  hyponatremia    f/u ugi series, bariatric st2 after appropriate ugi read  planned for HD within 30minutes  analgesia as needed  observation
Confidential Drug Utilization Report  Search Terms: Ayana Mace, 1980Search Date: 05/07/2025 09:11:49 AM  The Drug Utilization Report below displays all of the controlled substance prescriptions, if any, that your patient has filled in the last twelve months. The information displayed on this report is compiled from pharmacy submissions to the Department, and accurately reflects the information as submitted by the pharmacies.    This report was requested by: Damaris Cabral | Reference #: [id number]    You have not added a MURIEL number. Keeping your MURIEL number(s) up to date on the My MURIEL # tab will enable the separation of your prescriptions from others in the search results.    Practitioner Count: 0  Pharmacy Count: 0  Current Opioid Prescriptions: 0  Current Benzodiazepine Prescriptions: 0  Current Stimulant Prescriptions: 0      Patient Demographic Information (PDI)       PDI	First Name	Last Name	Birth Date	Gender	Street Address	[facility]	Zip Code  A	Ayana Mace	1980	Male	[address]	[facility]	[address]    Prescription Information      PDI Filter:    PDI	Current Rx	Drug Type	Rx Written	Rx Dispensed	Drug	Quantity	Days Supply  A	N	O	09/08/2024	09/10/2024	endocet 5-325 mg tablet	10	2  Prescriber Name Noah Hogue MD  Prescriber MURIEL # [id number]  Payment Method Insurance  Dispenser [facility]    * - Details of Drug Type : O = Opioid, B = Benzodiazepine, S = Stimulant    * - Drugs marked with an asterisk are compound drugs. If the compound drug is made up of more than one controlled substance, then each controlled substance will be a separate row in the table.    
Confidential Drug Utilization Report  Search Terms: Ayana Mace, 1980Search Date: 05/07/2025 09:11:49 AM  The Drug Utilization Report below displays all of the controlled substance prescriptions, if any, that your patient has filled in the last twelve months. The information displayed on this report is compiled from pharmacy submissions to the Department, and accurately reflects the information as submitted by the pharmacies.    This report was requested by: Damaris Cabral | Reference #: [id number]    You have not added a MURIEL number. Keeping your MURIEL number(s) up to date on the My MURIEL # tab will enable the separation of your prescriptions from others in the search results.    Practitioner Count: 0  Pharmacy Count: 0  Current Opioid Prescriptions: 0  Current Benzodiazepine Prescriptions: 0  Current Stimulant Prescriptions: 0      Patient Demographic Information (PDI)       PDI	First Name	Last Name	Birth Date	Gender	Street Address	[facility]	Zip Code  A	Ayana Mace	1980	Male	[address]	[facility]	[address]    Prescription Information      PDI Filter:    PDI	Current Rx	Drug Type	Rx Written	Rx Dispensed	Drug	Quantity	Days Supply  A	N	O	09/08/2024	09/10/2024	endocet 5-325 mg tablet	10	2  Prescriber Name Noah Hogue MD  Prescriber MURIEL # [id number]  Payment Method Insurance  Dispenser [facility]    * - Details of Drug Type : O = Opioid, B = Benzodiazepine, S = Stimulant    * - Drugs marked with an asterisk are compound drugs. If the compound drug is made up of more than one controlled substance, then each controlled substance will be a separate row in the table.

## 2025-05-09 NOTE — DISCHARGE NOTE NURSING/CASE MANAGEMENT/SOCIAL WORK - PATIENT PORTAL LINK FT
You can access the FollowMyHealth Patient Portal offered by [facility] by registering at the following website: [email or web address]. By joining [facility]’s FollowMyHealth portal, you will also be able to view your health information using other applications (apps) compatible with our system.

## 2025-05-09 NOTE — PROGRESS NOTE ADULT - NUTRITIONAL ASSESSMENT
This patient has been assessed with a concern for Malnutrition and has been determined to have a diagnosis/diagnoses of Morbid obesity (BMI > 40).    This patient is being managed with:   Diet Clear Liquid-  Bariatric Clear Liquid (BARICLLIQ)  Entered: May  6 2025 10:39AM  
This patient has been assessed with a concern for Malnutrition and has been determined to have a diagnosis/diagnoses of Morbid obesity (BMI > 40).    This patient is being managed with:   Diet Clear Liquid-  Bariatric Clear Liquid (BARICLLIQ)  Entered: May  6 2025 10:39AM

## 2025-05-09 NOTE — PROGRESS NOTE ADULT - PROBLEM SELECTOR PLAN 1
Pt with acute post-operative abdominal pain which is somatic and neuropathic in nature due to surgical procedure. Pt is s/p Robotic Assisted Laparoscopic Sleeve Gastrectomy on 5/5. POD#3. Code sepsis called this morning 5/8, pt febrile to 101F and with chest pain and tachypnea. Followed by Surgery Team. Pending CTA, CT A/P protocol, blood cultures. Surgery Team following.  Opioid pain recommendations   - Continue Dilaudid 1 mg IVP q3h PRN for severe pain. Monitor for oversedation and respiratory distress.   - Consider titrating up Dilaudid 2 mg IV q3h PRN if no contraindications for severe pain .    - Transition to Dilaudid 2 mg po q4h PRN for severe pain, once able to tolerate diet or pills.    Non-opioid pain recommendations   - No NSAIDs. Pt with +MARYLU, ESRD  - PT refusing Acetaminophen 1 gram IV q 8 hours PRN.  - Consider Lidocaine patch 4% x 2 daily  Bowel Regimen  - Per Primary team  Mild pain   - Non-pharmacological pain treatment recommendations  - Warm/ Cool packs PRN   - Repositioning extremity, elevation, imagery, relaxation, distraction.  - Physical therapy OOB if no contraindications   Recommendations discussed with primary team and RN.
Pt with acute post-operative abdominal pain which is somatic and neuropathic in nature due to surgical procedure. Pt is s/p Robotic Assisted Laparoscopic Sleeve Gastrectomy on 5/5. POD#4. Code sepsis called yesterday 5/8, pt afebrile overnight. V/Q scan neg for PE 5/8. CTAP shows Mild hematoma 5.8 x 2.7 cm hyperattenuating collection adjacent to the sleeve gastrectomy site. No leak. Followed by Surgery Team & ID. Blood cultures with no growth 5/8.   Opioid pain recommendations   - Continue Dilaudid 1 mg IVP q3h PRN for severe pain. Monitor for oversedation and respiratory distress.   - Consider titrating up Dilaudid 2 mg IV q3h PRN if no contraindications for severe pain .    - Transition to Dilaudid 2 mg po q4h PRN for severe pain, once able to tolerate diet or pills.  Non-opioid pain recommendations   - No NSAIDs. Pt with +MARYLU, ESRD  - PT refusing Acetaminophen 1 gram IV q 8 hours PRN.  - Consider Lidocaine patch 4% x 2 daily  Bowel Regimen  - Per Primary team  Mild pain   - Non-pharmacological pain treatment recommendations  - Warm/ Cool packs PRN   - Repositioning extremity, elevation, imagery, relaxation, distraction.  - Physical therapy OOB if no contraindications   Recommendations discussed with primary team and RN.
Ophthalmology

## 2025-05-09 NOTE — DISCHARGE NOTE NURSING/CASE MANAGEMENT/SOCIAL WORK - NSDCVIVACCINE_GEN_ALL_CORE_FT
influenza, injectable, quadrivalent, preservative free; 07-Apr-2016 12:13; Halle Ellsworth (RN); [employer]; [id number]; IntraMuscular; Deltoid Left.; 0.5 milliLiter(s); VIS (VIS Published: 07-Aug-2015, VIS Presented: 07-Apr-2016);   influenza, injectable, quadrivalent, preservative free; 06-Dec-2018 14:41; Rhonda Kern (RN); [employer]; [id number] (Exp. Date: 30-Jun-2019); IntraMuscular; Deltoid Right.; 0.5 milliLiter(s); VIS (VIS Published: 07-Aug-2015, VIS Presented: 06-Dec-2018);   influenza, injectable, quadrivalent, preservative free; 05-Mar-2019 16:51; Jace Best (RN); [employer]; [id number] (Exp. Date: 30-Jun-2019); IntraMuscular; Deltoid Right.; 0.5 milliLiter(s); VIS (VIS Published: 07-Aug-2015, VIS Presented: 05-Mar-2019);   influenza, injectable, quadrivalent, preservative free; 19-Dec-2020 12:14; Delmer Weinberg (RN); [employer]; [id number] (Exp. Date: 30-Jun-2021); IntraMuscular; Deltoid Right.; 0.5 milliLiter(s); VIS (VIS Published: 15-Aug-2019, VIS Presented: 19-Dec-2020);

## 2025-05-13 ENCOUNTER — APPOINTMENT (OUTPATIENT)
Dept: SURGERY | Facility: CLINIC | Age: 45
End: 2025-05-13
Payer: MEDICARE

## 2025-05-13 VITALS
BODY MASS INDEX: 36.45 KG/M2 | SYSTOLIC BLOOD PRESSURE: 144 MMHG | HEIGHT: 78 IN | WEIGHT: 315 LBS | DIASTOLIC BLOOD PRESSURE: 74 MMHG | HEART RATE: 65 BPM

## 2025-05-13 DIAGNOSIS — Z98.84 BARIATRIC SURGERY STATUS: ICD-10-CM

## 2025-05-13 PROBLEM — Z87.19 PERSONAL HISTORY OF OTHER DISEASES OF THE DIGESTIVE SYSTEM: Chronic | Status: ACTIVE | Noted: 2025-04-16

## 2025-05-13 PROBLEM — F17.200 NICOTINE DEPENDENCE, UNSPECIFIED, UNCOMPLICATED: Chronic | Status: ACTIVE | Noted: 2025-04-17

## 2025-05-13 PROCEDURE — 99024 POSTOP FOLLOW-UP VISIT: CPT

## 2025-05-20 LAB — SURGICAL PATHOLOGY STUDY: SIGNIFICANT CHANGE UP
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
## 2025-06-02 ENCOUNTER — INPATIENT (INPATIENT)
Facility: HOSPITAL | Age: 45
LOS: 1 days | Discharge: HOME | DRG: 392 | End: 2025-06-04
Attending: SURGERY | Admitting: SURGERY
Payer: MEDICARE

## 2025-06-02 VITALS
RESPIRATION RATE: 20 BRPM | WEIGHT: 315 LBS | TEMPERATURE: 98 F | HEIGHT: 78 IN | HEART RATE: 60 BPM | SYSTOLIC BLOOD PRESSURE: 137 MMHG | DIASTOLIC BLOOD PRESSURE: 66 MMHG | OXYGEN SATURATION: 99 %

## 2025-06-02 DIAGNOSIS — Z98.890 OTHER SPECIFIED POSTPROCEDURAL STATES: Chronic | ICD-10-CM

## 2025-06-02 LAB
ALBUMIN SERPL ELPH-MCNC: 3.2 G/DL — LOW (ref 3.5–5)
ALP SERPL-CCNC: 136 U/L — HIGH (ref 40–120)
ALT FLD-CCNC: 16 U/L DA — SIGNIFICANT CHANGE UP (ref 10–60)
ANION GAP SERPL CALC-SCNC: 9 MMOL/L — SIGNIFICANT CHANGE UP (ref 5–17)
APTT BLD: 44.4 SEC — HIGH (ref 26.1–36.8)
AST SERPL-CCNC: 25 U/L — SIGNIFICANT CHANGE UP (ref 10–40)
BASOPHILS # BLD AUTO: 0.01 K/UL — SIGNIFICANT CHANGE UP (ref 0–0.2)
BASOPHILS NFR BLD AUTO: 0.1 % — SIGNIFICANT CHANGE UP (ref 0–2)
BILIRUB SERPL-MCNC: 0.5 MG/DL — SIGNIFICANT CHANGE UP (ref 0.2–1.2)
BUN SERPL-MCNC: 55 MG/DL — HIGH (ref 7–18)
CALCIUM SERPL-MCNC: 8.7 MG/DL — SIGNIFICANT CHANGE UP (ref 8.4–10.5)
CHLORIDE SERPL-SCNC: 95 MMOL/L — LOW (ref 96–108)
CO2 SERPL-SCNC: 29 MMOL/L — SIGNIFICANT CHANGE UP (ref 22–31)
CREAT SERPL-MCNC: 7.58 MG/DL — HIGH (ref 0.5–1.3)
EGFR: 8 ML/MIN/1.73M2 — LOW
EGFR: 8 ML/MIN/1.73M2 — LOW
EOSINOPHIL # BLD AUTO: 0.14 K/UL — SIGNIFICANT CHANGE UP (ref 0–0.5)
EOSINOPHIL NFR BLD AUTO: 2.1 % — SIGNIFICANT CHANGE UP (ref 0–6)
GLUCOSE BLDC GLUCOMTR-MCNC: 95 MG/DL — SIGNIFICANT CHANGE UP (ref 70–99)
GLUCOSE SERPL-MCNC: 100 MG/DL — HIGH (ref 70–99)
HCT VFR BLD CALC: 29 % — LOW (ref 39–50)
HGB BLD-MCNC: 9.4 G/DL — LOW (ref 13–17)
IMM GRANULOCYTES NFR BLD AUTO: 0.3 % — SIGNIFICANT CHANGE UP (ref 0–0.9)
INR BLD: 1.11 RATIO — SIGNIFICANT CHANGE UP (ref 0.85–1.16)
LYMPHOCYTES # BLD AUTO: 0.78 K/UL — LOW (ref 1–3.3)
LYMPHOCYTES # BLD AUTO: 11.5 % — LOW (ref 13–44)
MCHC RBC-ENTMCNC: 31.6 PG — SIGNIFICANT CHANGE UP (ref 27–34)
MCHC RBC-ENTMCNC: 32.4 G/DL — SIGNIFICANT CHANGE UP (ref 32–36)
MCV RBC AUTO: 97.6 FL — SIGNIFICANT CHANGE UP (ref 80–100)
MONOCYTES # BLD AUTO: 0.65 K/UL — SIGNIFICANT CHANGE UP (ref 0–0.9)
MONOCYTES NFR BLD AUTO: 9.6 % — SIGNIFICANT CHANGE UP (ref 2–14)
NEUTROPHILS # BLD AUTO: 5.16 K/UL — SIGNIFICANT CHANGE UP (ref 1.8–7.4)
NEUTROPHILS NFR BLD AUTO: 76.4 % — SIGNIFICANT CHANGE UP (ref 43–77)
NRBC BLD AUTO-RTO: 0 /100 WBCS — SIGNIFICANT CHANGE UP (ref 0–0)
PLATELET # BLD AUTO: 214 K/UL — SIGNIFICANT CHANGE UP (ref 150–400)
POTASSIUM SERPL-MCNC: 3.9 MMOL/L — SIGNIFICANT CHANGE UP (ref 3.5–5.3)
POTASSIUM SERPL-SCNC: 3.9 MMOL/L — SIGNIFICANT CHANGE UP (ref 3.5–5.3)
PROT SERPL-MCNC: 8.3 G/DL — SIGNIFICANT CHANGE UP (ref 6–8.3)
PROTHROM AB SERPL-ACNC: 13 SEC — SIGNIFICANT CHANGE UP (ref 9.9–13.4)
RBC # BLD: 2.97 M/UL — LOW (ref 4.2–5.8)
RBC # FLD: 14.4 % — SIGNIFICANT CHANGE UP (ref 10.3–14.5)
SODIUM SERPL-SCNC: 133 MMOL/L — LOW (ref 135–145)
TROPONIN I, HIGH SENSITIVITY RESULT: 24.9 NG/L — SIGNIFICANT CHANGE UP
WBC # BLD: 6.76 K/UL — SIGNIFICANT CHANGE UP (ref 3.8–10.5)
WBC # FLD AUTO: 6.76 K/UL — SIGNIFICANT CHANGE UP (ref 3.8–10.5)

## 2025-06-02 PROCEDURE — S2900: CPT

## 2025-06-02 PROCEDURE — 80048 BASIC METABOLIC PNL TOTAL CA: CPT

## 2025-06-02 PROCEDURE — 83605 ASSAY OF LACTIC ACID: CPT

## 2025-06-02 PROCEDURE — 99285 EMERGENCY DEPT VISIT HI MDM: CPT

## 2025-06-02 PROCEDURE — 74220 X-RAY XM ESOPHAGUS 1CNTRST: CPT

## 2025-06-02 PROCEDURE — 87637 SARSCOV2&INF A&B&RSV AMP PRB: CPT

## 2025-06-02 PROCEDURE — 94660 CPAP INITIATION&MGMT: CPT

## 2025-06-02 PROCEDURE — 87340 HEPATITIS B SURFACE AG IA: CPT

## 2025-06-02 PROCEDURE — 99261: CPT

## 2025-06-02 PROCEDURE — 36415 COLL VENOUS BLD VENIPUNCTURE: CPT

## 2025-06-02 PROCEDURE — 88307 TISSUE EXAM BY PATHOLOGIST: CPT

## 2025-06-02 PROCEDURE — 74176 CT ABD & PELVIS W/O CONTRAST: CPT | Mod: 26

## 2025-06-02 PROCEDURE — 84100 ASSAY OF PHOSPHORUS: CPT

## 2025-06-02 PROCEDURE — 85730 THROMBOPLASTIN TIME PARTIAL: CPT

## 2025-06-02 PROCEDURE — 93010 ELECTROCARDIOGRAM REPORT: CPT

## 2025-06-02 PROCEDURE — 71045 X-RAY EXAM CHEST 1 VIEW: CPT | Mod: 26

## 2025-06-02 PROCEDURE — 97162 PT EVAL MOD COMPLEX 30 MIN: CPT

## 2025-06-02 PROCEDURE — 86901 BLOOD TYPING SEROLOGIC RH(D): CPT

## 2025-06-02 PROCEDURE — 80053 COMPREHEN METABOLIC PANEL: CPT

## 2025-06-02 PROCEDURE — 82962 GLUCOSE BLOOD TEST: CPT

## 2025-06-02 PROCEDURE — 86900 BLOOD TYPING SEROLOGIC ABO: CPT

## 2025-06-02 PROCEDURE — 93005 ELECTROCARDIOGRAM TRACING: CPT

## 2025-06-02 PROCEDURE — 86706 HEP B SURFACE ANTIBODY: CPT

## 2025-06-02 PROCEDURE — 86850 RBC ANTIBODY SCREEN: CPT

## 2025-06-02 PROCEDURE — C1889: CPT

## 2025-06-02 PROCEDURE — 87040 BLOOD CULTURE FOR BACTERIA: CPT

## 2025-06-02 PROCEDURE — 71045 X-RAY EXAM CHEST 1 VIEW: CPT

## 2025-06-02 PROCEDURE — 85027 COMPLETE CBC AUTOMATED: CPT

## 2025-06-02 PROCEDURE — 85025 COMPLETE CBC W/AUTO DIFF WBC: CPT

## 2025-06-02 PROCEDURE — 83735 ASSAY OF MAGNESIUM: CPT

## 2025-06-02 PROCEDURE — 70450 CT HEAD/BRAIN W/O DYE: CPT | Mod: 26

## 2025-06-02 PROCEDURE — 0241U: CPT

## 2025-06-02 PROCEDURE — 85610 PROTHROMBIN TIME: CPT

## 2025-06-02 PROCEDURE — 82803 BLOOD GASES ANY COMBINATION: CPT

## 2025-06-02 PROCEDURE — 74150 CT ABDOMEN W/O CONTRAST: CPT

## 2025-06-02 PROCEDURE — 78582 LUNG VENTILAT&PERFUS IMAGING: CPT

## 2025-06-02 RX ORDER — ACETAMINOPHEN 500 MG/5ML
1000 LIQUID (ML) ORAL EVERY 8 HOURS
Refills: 0 | Status: DISCONTINUED | OUTPATIENT
Start: 2025-06-02 | End: 2025-06-04

## 2025-06-02 RX ORDER — AMPICILLIN SODIUM AND SULBACTAM SODIUM 1; .5 G/1; G/1
INJECTION, POWDER, FOR SOLUTION INTRAMUSCULAR; INTRAVENOUS
Refills: 0 | Status: DISCONTINUED | OUTPATIENT
Start: 2025-06-02 | End: 2025-06-04

## 2025-06-02 RX ORDER — LABETALOL HYDROCHLORIDE 200 MG/1
10 TABLET, FILM COATED ORAL EVERY 12 HOURS
Refills: 0 | Status: DISCONTINUED | OUTPATIENT
Start: 2025-06-02 | End: 2025-06-03

## 2025-06-02 RX ORDER — AMPICILLIN SODIUM AND SULBACTAM SODIUM 1; .5 G/1; G/1
3 INJECTION, POWDER, FOR SOLUTION INTRAMUSCULAR; INTRAVENOUS EVERY 6 HOURS
Refills: 0 | Status: DISCONTINUED | OUTPATIENT
Start: 2025-06-02 | End: 2025-06-04

## 2025-06-02 RX ORDER — AMPICILLIN SODIUM AND SULBACTAM SODIUM 1; .5 G/1; G/1
3 INJECTION, POWDER, FOR SOLUTION INTRAMUSCULAR; INTRAVENOUS ONCE
Refills: 0 | Status: COMPLETED | OUTPATIENT
Start: 2025-06-02 | End: 2025-06-02

## 2025-06-02 RX ORDER — ACETAMINOPHEN 500 MG/5ML
1000 LIQUID (ML) ORAL ONCE
Refills: 0 | Status: COMPLETED | OUTPATIENT
Start: 2025-06-02 | End: 2025-06-02

## 2025-06-02 RX ORDER — HEPARIN SODIUM 1000 [USP'U]/ML
7500 INJECTION INTRAVENOUS; SUBCUTANEOUS EVERY 12 HOURS
Refills: 0 | Status: DISCONTINUED | OUTPATIENT
Start: 2025-06-02 | End: 2025-06-04

## 2025-06-02 RX ORDER — ALBUTEROL SULFATE 2.5 MG/3ML
2 VIAL, NEBULIZER (ML) INHALATION EVERY 6 HOURS
Refills: 0 | Status: DISCONTINUED | OUTPATIENT
Start: 2025-06-02 | End: 2025-06-04

## 2025-06-02 RX ORDER — DIATRIZOATE MEGLUMINE, SODIUM 66 %-10 %
30 VIAL (ML) INJECTION ONCE
Refills: 0 | Status: DISCONTINUED | OUTPATIENT
Start: 2025-06-02 | End: 2025-06-04

## 2025-06-02 RX ORDER — DIATRIZOATE MEGLUMINE, SODIUM 66 %-10 %
30 VIAL (ML) INJECTION ONCE
Refills: 0 | Status: COMPLETED | OUTPATIENT
Start: 2025-06-02 | End: 2025-06-02

## 2025-06-02 RX ORDER — HYDROMORPHONE/SOD CHLOR,ISO/PF 2 MG/10 ML
1 SYRINGE (ML) INJECTION EVERY 4 HOURS
Refills: 0 | Status: DISCONTINUED | OUTPATIENT
Start: 2025-06-02 | End: 2025-06-04

## 2025-06-02 RX ORDER — EPOETIN ALFA 10000 [IU]/ML
4000 SOLUTION INTRAVENOUS; SUBCUTANEOUS
Refills: 0 | Status: DISCONTINUED | OUTPATIENT
Start: 2025-06-02 | End: 2025-06-04

## 2025-06-02 RX ORDER — ONDANSETRON HCL/PF 4 MG/2 ML
4 VIAL (ML) INJECTION EVERY 6 HOURS
Refills: 0 | Status: DISCONTINUED | OUTPATIENT
Start: 2025-06-02 | End: 2025-06-04

## 2025-06-02 RX ADMIN — AMPICILLIN SODIUM AND SULBACTAM SODIUM 200 GRAM(S): 1; .5 INJECTION, POWDER, FOR SOLUTION INTRAMUSCULAR; INTRAVENOUS at 14:10

## 2025-06-02 RX ADMIN — AMPICILLIN SODIUM AND SULBACTAM SODIUM 200 GRAM(S): 1; .5 INJECTION, POWDER, FOR SOLUTION INTRAMUSCULAR; INTRAVENOUS at 23:54

## 2025-06-02 RX ADMIN — Medication 30 MILLILITER(S): at 08:47

## 2025-06-02 RX ADMIN — EPOETIN ALFA 4000 UNIT(S): 10000 SOLUTION INTRAVENOUS; SUBCUTANEOUS at 15:37

## 2025-06-02 RX ADMIN — Medication 50 MILLILITER(S): at 20:58

## 2025-06-02 RX ADMIN — Medication 1 MILLIGRAM(S): at 14:14

## 2025-06-02 RX ADMIN — Medication 1 MILLIGRAM(S): at 21:59

## 2025-06-02 RX ADMIN — HEPARIN SODIUM 7500 UNIT(S): 1000 INJECTION INTRAVENOUS; SUBCUTANEOUS at 14:11

## 2025-06-02 RX ADMIN — Medication 1 MILLIGRAM(S): at 14:44

## 2025-06-02 RX ADMIN — Medication 1 MILLIGRAM(S): at 20:59

## 2025-06-02 NOTE — PATIENT PROFILE ADULT - FALL HARM RISK - HARM RISK INTERVENTIONS

## 2025-06-02 NOTE — H&P ADULT - ASSESSMENT
45M with mult comorbidities s/p sleeve gastrectomy 5/5/25 now p/w abd collection adjacent to the stomach  VSS, afebrile, HR WNL     Plan    - admit to surgical service under Dr. Bueno  - strict NPO, light IVF  - plan for EGD tomorrow  - preop labs  - IV antibiotics  - pain / nausea control prn   - DVT ppx     HTN   - monitor BP   - PO to IV conversion

## 2025-06-02 NOTE — H&P ADULT - NSHPLABSRESULTS_GEN_ALL_CORE
9.4    6.76  )-----------( 214      ( 02 Jun 2025 08:37 )             29.0       06-02    133[L]  |  95[L]  |  55[H]  ----------------------------<  100[H]  3.9   |  29  |  7.58[H]    Ca    8.7      02 Jun 2025 08:37    TPro  8.3  /  Alb  3.2[L]  /  TBili  0.5  /  DBili  x   /  AST  25  /  ALT  16  /  AlkPhos  136[H]  06-02    < from: CT Abdomen and Pelvis w/ Oral Cont (06.02.25 @ 10:21) >      ACC: [id number] EXAM:  CT ABDOMEN AND PELVIS OC   ORDERED BY: NEDA MACIEL     PROCEDURE DATE:  06/02/2025          INTERPRETATION:  CLINICAL INFORMATION: Abdominal pain    COMPARISON: 5/8/2025    CONTRAST/COMPLICATIONS:  IV Contrast: NONE  Oral Contrast: Gastroview  .    PROCEDURE:  CT of the Abdomen and Pelvis was performed.  Sagittal and coronal reformats were performed.    FINDINGS:  LOWER CHEST: Basilar atelectasis. Trace left pleural effusion.   Cardiomegaly.    Please note that evaluation of the abdominal organs and vascular   structures is limited by lack of intravenous contrast.    LIVER: Within normal limits.  BILE DUCTS: Normal caliber.  GALLBLADDER: Cholecystectomy.  SPLEEN: Splenomegaly.  PANCREAS: Within normal limits.  ADRENALS: Within normal limits.  KIDNEYS/URETERS: Atrophic kidneys. No hydronephrosis.    BLADDER: Within normal limits.  REPRODUCTIVE ORGANS: Prostate gland calcifications.    BOWEL: Sleeve gastrectomy. No bowel obstruction. Appendix is within   normal limits.  PERITONEUM/RETROPERITONEUM: Left upper abdominal collections measuring   2.6 x 2.5 cm and 1.4 cm adjacent to the stomach. Trace hyperdense   material within the collections.  VESSELS: Atherosclerotic calcifications.  LYMPH NODES: No lymphadenopathy.  ABDOMINAL WALL: Postsurgical changes.  BONES: Within normal limits.    IMPRESSION:    Status post sleeve gastrectomy with left upper abdominal collections (2.6   x 2.5 cm and 1.4 cm) adjacent to the stomach. Trace hyperdense material   within the collections, question oral contrast.    No bowel obstruction.    --- End of Report ---            HELENE CORONA MD; Attending Radiologist  This document has been electronically signed. Jun 2 2025 10:40AM    < end of copied text >

## 2025-06-02 NOTE — CONSULT NOTE ADULT - SUBJECTIVE AND OBJECTIVE BOX
[facility] : Progress Note :: [phone], (office [phone]),   Dr La / Dr Hui / Dr Barber / Dr Villalobos / Dr Hang CASTELLANO / Dr Mehta / Dr Sanchez / Dr Alber dumont  _____________________________________________________________________________________________  Patient is a 45y Male whom presented to the hospital from the dialysis unit with abdominal  pain and lightheadedness.  he is S/P sleeve gastrectomy a month prior. During HD complained of epigastric pain and light-headedness. Only tolerated an hour of HD, EMS called. In ED CT scan revealed collection around gastrectomy site-   admitted for MX of gastrectomy sleeve leak.  renal consulted for ESRD    PAST MEDICAL & SURGICAL HISTORY:  Morbid obesity with BMI of 40.0-44.9, adult      ESRD on dialysis      Bipolar disorder      DM (diabetes mellitus)      HTN (hypertension)      Migraine      COPD (chronic obstructive pulmonary disease)      Renal failure      Asthma with COPD      Diabetes mellitus, type 2      Hypertension      Schizophrenia      Anxiety      Schizophrenia      COPD (chronic obstructive pulmonary disease)      HTN (hypertension)      ESRD on dialysis      HLD (hyperlipidemia)      History of cholecystitis      Smokes tobacco daily      H/O hernia repair      S/P arteriovenous (AV) fistula creation  right side      History of cholecystectomy        penicillins (Swelling)  shellfish (Anaphylaxis)  [place] (Anaphylaxis)  fish (Unknown)  penicillin (Anaphylaxis)  aspirin (Swelling)  Seafood (Hives)  shellfish (Rash)    Home Medications Reviewed  Hospital Medications:   MEDICATIONS  (STANDING):  ampicillin/sulbactam  IVPB      ampicillin/sulbactam  IVPB 3 Gram(s) IV Intermittent every 6 hours  diatrizoate meglumine/diatrizoate sodium. 30 milliLiter(s) Oral once  epoetin cyndie-epbx (RETACRIT) Injectable 4000 Unit(s) IV Push <User Schedule>  heparin   Injectable 7500 Unit(s) SubCutaneous every 12 hours  hydrALAZINE Injectable 25 milliGRAM(s) IV Push every 12 hours  labetalol Injectable 10 milliGRAM(s) IV Push every 12 hours  sodium chloride 0.9%. 1000 milliLiter(s) (50 mL/Hr) IV Continuous <Continuous>    SOCIAL HISTORY:  Denies ETOh,Smoking,   FAMILY HISTORY:  FH: lymphoma (Sibling)    FH: rheumatoid arthritis (Sibling)          VITALS:  T(F): 97.4 (06-02-25 @ 15:30), Max: 98.4 (06-02-25 @ 11:20)  HR: 59 (06-02-25 @ 15:30)  BP: 129/72 (06-02-25 @ 15:30)  RR: 16 (06-02-25 @ 15:30)  SpO2: 100% (06-02-25 @ 15:30)  Wt(kg): --    Height (cm): 200.7 (06-02 @ 07:40)  Weight (kg): 167.8 (06-02 @ 07:40)  BMI (kg/m2): 41.7 (06-02 @ 07:40)  BSA (m2): 2.96 (06-02 @ 07:40)  PHYSICAL EXAM:  Constitutional: obese++  HEENT: anicteric sclera, oropharynx clear,   Neck: No JVD  Respiratory: CTAB, no wheezes, rales or rhonchi  Cardiovascular: S1, S2, RRR  Gastrointestinal: BS+, soft, NT/ND  Extremities: No cyanosis or clubbing. No peripheral edema  Neurological: A/O x 3, no focal deficits  Vascular Access: AVF with thrill and bruit     LABS:  06-02    133[L]  |  95[L]  |  55[H]  ----------------------------<  100[H]  3.9   |  29  |  7.58[H]    Ca    8.7      02 Jun 2025 08:37    TPro  8.3  /  Alb  3.2[L]  /  TBili  0.5  /  DBili      /  AST  25  /  ALT  16  /  AlkPhos  136[H]  06-02    Creatinine Trend: 7.58 <--                        9.4    6.76  )-----------( 214      ( 02 Jun 2025 08:37 )             29.0     Urine Studies:  Urinalysis Basic - ( 02 Jun 2025 08:37 )    Color:  / Appearance:  / SG:  / pH:   Gluc: 100 mg/dL / Ketone:   / Bili:  / Urobili:    Blood:  / Protein:  / Nitrite:    Leuk Esterase:  / RBC:  / WBC    Sq Epi:  / Non Sq Epi:  / Bacteria:         RADIOLOGY & ADDITIONAL STUDIES:

## 2025-06-02 NOTE — ED ADULT NURSE NOTE - OBJECTIVE STATEMENT
pt c/o abdominal and chest pain starting this morning. pt c/o abdominal and chest pain starting this morning. pt denies any sob/ nausea/ vomiting/fever/ chils. pt s/p gastric sleeve surgery 5/21/25. pt pmh; pt c/o abdominal and chest pain starting this morning. pt denies any sob/ nausea/ vomiting/fever/ chils. pt s/p gastric sleeve surgery 5/21/25. pt pmh; htn, copd, o2via nasal cannula 4L, esrd right arm av fistula.asthma, dm.

## 2025-06-02 NOTE — H&P ADULT - NS ATTEND AMEND GEN_ALL_CORE FT
Concern for leak on CT, plan for admission, IV Abx, strict NPO, upper GI in AM.   No signs of sepsis at this time.

## 2025-06-02 NOTE — PATIENT PROFILE ADULT - NSPROPOAURINARYCATHETER_GEN_A_NUR
Incoming Fax Received  Provider: Fawn Han NP  Facility: [facility]  Subject: INR Test Results 2.5  Date : 03/28/22  Outcome: Routed to the [facility], [address] Team
Patient comes to clinic for follow up anticoagulation visit. Last INR on 3/21 was 2.4. Dose maintained. Today's INR is 2.5 and is within goal range. Current warfarin total weekly dose of 42.5 mg verified. Informed the INR result is within therapeutic range and instructed to maintain current dose per protocol. Discussed dose and return date of 1 week with home meter for next INR. See Anticoagulation flowsheet. Dr. Deborah Gold is in the office today supervising the treatment. Masoud Bernabe NP is referring provider. Instructed to contact the clinic with any unusual bleeding or bruising, any changes in medications, diet, health status, lifestyle, or any other changes, questions or concerns. Verbalized understanding of all discussed.
no

## 2025-06-02 NOTE — H&P ADULT - HISTORY OF PRESENT ILLNESS
45 year old morbidly obese male with pmhx of anxiety, bipolar disorder, schizophrenia, ESRD on HD through [facility] (M,W, F), asthma with COPD, hypertension, hyperlipidemia, migraine and cholecystitis presents with c/o failure to loose despite several dietary modalities, calorie reduction, portion control. Pt underwent Robotic Assisted Laparoscopic Sleeve Gastrectomy possible open on 5/5/2025. Pt discharged remained admitted from 5/5/2025 - 5/9/2025. During admission, pt found to have mild hematoma adjacent to sleeve gastrectomy, no extravasation of oral contract. Pt was sent home with PICC line for long term IV abx therapy. PICC line has been removed and he was taking oral abx at home - cannot recall the name. Pt states he has had no issues at home since surgery until last week. He began with have diarrhea with loss of appetite. Pt states he started having severe abdominal pain this morning during HD, was sent in to the ER after 1 hr of HD. Admits abdominal pain is localized to the upper abd/epigastric region. Denies vomiting, fever, chills. Denies bloody bowel movements.     Pt admits to feeling better with pain medications administered in the ED.

## 2025-06-02 NOTE — ED ADULT NURSE NOTE - COVID-19 RESULT
----- Message from Daily Hooker sent at 10/21/2020 11:26 AM CDT -----  Type: Needs Medical Advice  Who Called: PT  Best Call Back Number: [phone]  Additional Information: pt is requesting a call back in regards to being put on meds for urination. Please and thank you     NEGATIVE

## 2025-06-02 NOTE — ED ADULT TRIAGE NOTE - CHIEF COMPLAINT QUOTE
BIBA from HD c/o chest pain, epigastric pain, dizziness, lightheadedness while standing up  Only completed 1 hour of HD today

## 2025-06-02 NOTE — ED PROVIDER NOTE - CLINICAL SUMMARY MEDICAL DECISION MAKING FREE TEXT BOX
patient presenting with chest pain lightheadedness will obtain lab assess for ACS CT head ED observation reassess patient had recent gastric bypass obtain CT abdomen oral contrast

## 2025-06-02 NOTE — ED PROVIDER NOTE - OBJECTIVE STATEMENT
45-year-old presenting with chest pain.  symptoms started while getting dialysis completed only 1 hour dialysis prior to arrival endorses noting lightheadedness that occurred before even going to dialysis center denies any fever chills travel sick contact patient had a gastric bypass proximately 1 month ago

## 2025-06-02 NOTE — H&P ADULT - NSICDXPASTMEDICALHX_GEN_ALL_CORE_FT
PAST MEDICAL HISTORY:  Anxiety     Asthma with COPD     Bipolar disorder     COPD (chronic obstructive pulmonary disease)     COPD (chronic obstructive pulmonary disease)     Diabetes mellitus, type 2     DM (diabetes mellitus)     ESRD on dialysis     ESRD on dialysis     History of cholecystitis     HLD (hyperlipidemia)     HTN (hypertension)     HTN (hypertension)     Hypertension     Migraine     Morbid obesity with BMI of 40.0-44.9, adult     Renal failure     Schizophrenia     Schizophrenia     Smokes tobacco daily

## 2025-06-02 NOTE — CONSULT NOTE ADULT - ASSESSMENT
# ESRD. as did not complete HD today will resume HD   # abdominal pain- sleeve gastrectomy leakage- MX per surgery. on ABX  # anemia of CKD epoge on HD   # HTN. BP stable.   # CKDMBD. check phos. would hold binders for now

## 2025-06-02 NOTE — ED ADULT NURSE NOTE - CHIEF COMPLAINT QUOTE
Whitney Baum in 1 month.                 
BIBA from HD c/o chest pain, epigastric pain, dizziness, lightheadedness while standing up  Only completed 1 hour of HD today

## 2025-06-02 NOTE — ED ADULT TRIAGE NOTE - NS ED NURSE BANDS TYPE
3.22.23 Parking placard signed by MD and placed at  for patient pick-up. Form sent to Completed Folder.      Name band;

## 2025-06-02 NOTE — ED PROVIDER NOTE - WET READ LAUNCH FT
Xerosis Normal Treatment: I recommended application of Cetaphil or CeraVe numerous times a day and before going to bed to all dry areas. There are no Wet Read(s) to document. There is 1 Wet Read(s) to document.

## 2025-06-02 NOTE — H&P ADULT - NSHPPHYSICALEXAM_GEN_ALL_CORE
General:  Appears stated age, well-groomed, no acute distress  Eyes: EOMI  HENT:  WNL, no JVD.   Respirations: Unlabored breathing. Equal chest rise bilaterally. No use of accessory muscles, no signs of respiratory distress.   Cardio: L radial pulses strong, intact. Normal Rate.   Abdomen: obese, soft, non distended, nontender to palpation diffusely. No voluntary guarding, no rebound tenderness, no palpable masses. No peritonitic signs.   Extremities: no edema bilaterally. RUE AVF - dressing clean / dry / intact.   Skin: warm and dry. Non diaphoretic.   Musculoskeletal: no calf tenderness b/l   Neuro:  Alert, oriented to time, place and person   Psych: normal affect

## 2025-06-02 NOTE — PATIENT PROFILE ADULT - FALL HARM RISK - FACTORS NURSING JUDGEMENT
PRE-SEDATION ASSESSMENT    CONSENT  Consent for procedure and sedation obtained: Yes    MEDICAL HISTORY  Significant medical/surgical history: No  Past Complications with Sedation/Anesthesia: No  Significant Family History: No  Smoking History: No  Alcohol/Drug abuse: No  Possible Pregnancy (LMP): No  Cardiac History: No  Respiratory History: No    PHYSICAL EXAM  History and Physical Reviewed: Patient has valid H&P within 30 days. I have reviewed and there are no changes.  Airway Risk History: No previous complications  Airway Anatomy : Class II  Heart : Normal  Lungs : Normal  LOC/Mental Status : Normal    OTHER FINDINGS  Reviewed current medications and allergies: Yes  Pertinent lab/diagnostic test reviewed: Yes    SEDATION RISK ASSESSMENT  Risk Status ASA: Class II - Normal patient with mild systemic disease  Plan for Sedation: Moderate Sedation  Indications for Procedure/Pre-Procedure Diagnosis and Planned Procedure: Right KNee RFA  EKG Monitoring: Yes    NARRATIVE FINDINGS     
Yes

## 2025-06-03 ENCOUNTER — APPOINTMENT (OUTPATIENT)
Dept: SURGERY | Facility: CLINIC | Age: 45
End: 2025-06-03

## 2025-06-03 LAB
ANION GAP SERPL CALC-SCNC: 6 MMOL/L — SIGNIFICANT CHANGE UP (ref 5–17)
BASOPHILS # BLD AUTO: 0.01 K/UL — SIGNIFICANT CHANGE UP (ref 0–0.2)
BASOPHILS NFR BLD AUTO: 0.2 % — SIGNIFICANT CHANGE UP (ref 0–2)
BUN SERPL-MCNC: 44 MG/DL — HIGH (ref 7–18)
CALCIUM SERPL-MCNC: 9 MG/DL — SIGNIFICANT CHANGE UP (ref 8.4–10.5)
CHLORIDE SERPL-SCNC: 97 MMOL/L — SIGNIFICANT CHANGE UP (ref 96–108)
CO2 SERPL-SCNC: 30 MMOL/L — SIGNIFICANT CHANGE UP (ref 22–31)
CREAT SERPL-MCNC: 6.52 MG/DL — HIGH (ref 0.5–1.3)
EGFR: 10 ML/MIN/1.73M2 — LOW
EGFR: 10 ML/MIN/1.73M2 — LOW
EOSINOPHIL # BLD AUTO: 0.09 K/UL — SIGNIFICANT CHANGE UP (ref 0–0.5)
EOSINOPHIL NFR BLD AUTO: 1.7 % — SIGNIFICANT CHANGE UP (ref 0–6)
GLUCOSE SERPL-MCNC: 77 MG/DL — SIGNIFICANT CHANGE UP (ref 70–99)
HBV SURFACE AB SER-ACNC: REACTIVE — SIGNIFICANT CHANGE UP
HBV SURFACE AB SER-ACNC: REACTIVE — SIGNIFICANT CHANGE UP
HBV SURFACE AG SER-ACNC: SIGNIFICANT CHANGE UP
HBV SURFACE AG SER-ACNC: SIGNIFICANT CHANGE UP
HCT VFR BLD CALC: 29.2 % — LOW (ref 39–50)
HGB BLD-MCNC: 9.2 G/DL — LOW (ref 13–17)
IMM GRANULOCYTES NFR BLD AUTO: 0.4 % — SIGNIFICANT CHANGE UP (ref 0–0.9)
LYMPHOCYTES # BLD AUTO: 0.63 K/UL — LOW (ref 1–3.3)
LYMPHOCYTES # BLD AUTO: 12.2 % — LOW (ref 13–44)
MCHC RBC-ENTMCNC: 31.3 PG — SIGNIFICANT CHANGE UP (ref 27–34)
MCHC RBC-ENTMCNC: 31.5 G/DL — LOW (ref 32–36)
MCV RBC AUTO: 99.3 FL — SIGNIFICANT CHANGE UP (ref 80–100)
MONOCYTES # BLD AUTO: 0.64 K/UL — SIGNIFICANT CHANGE UP (ref 0–0.9)
MONOCYTES NFR BLD AUTO: 12.4 % — SIGNIFICANT CHANGE UP (ref 2–14)
MRSA PCR RESULT.: SIGNIFICANT CHANGE UP
NEUTROPHILS # BLD AUTO: 3.79 K/UL — SIGNIFICANT CHANGE UP (ref 1.8–7.4)
NEUTROPHILS NFR BLD AUTO: 73.1 % — SIGNIFICANT CHANGE UP (ref 43–77)
NRBC BLD AUTO-RTO: 0 /100 WBCS — SIGNIFICANT CHANGE UP (ref 0–0)
PLATELET # BLD AUTO: 193 K/UL — SIGNIFICANT CHANGE UP (ref 150–400)
POTASSIUM SERPL-MCNC: 3.4 MMOL/L — LOW (ref 3.5–5.3)
POTASSIUM SERPL-SCNC: 3.4 MMOL/L — LOW (ref 3.5–5.3)
RBC # BLD: 2.94 M/UL — LOW (ref 4.2–5.8)
RBC # FLD: 14.2 % — SIGNIFICANT CHANGE UP (ref 10.3–14.5)
S AUREUS DNA NOSE QL NAA+PROBE: DETECTED
SODIUM SERPL-SCNC: 133 MMOL/L — LOW (ref 135–145)
WBC # BLD: 5.18 K/UL — SIGNIFICANT CHANGE UP (ref 3.8–10.5)
WBC # FLD AUTO: 5.18 K/UL — SIGNIFICANT CHANGE UP (ref 3.8–10.5)

## 2025-06-03 PROCEDURE — 74220 X-RAY XM ESOPHAGUS 1CNTRST: CPT | Mod: 26

## 2025-06-03 RX ORDER — SEVELAMER HYDROCHLORIDE 800 MG/1
800 TABLET ORAL EVERY 8 HOURS
Refills: 0 | Status: DISCONTINUED | OUTPATIENT
Start: 2025-06-03 | End: 2025-06-04

## 2025-06-03 RX ORDER — AMLODIPINE BESYLATE 10 MG/1
5 TABLET ORAL DAILY
Refills: 0 | Status: DISCONTINUED | OUTPATIENT
Start: 2025-06-03 | End: 2025-06-04

## 2025-06-03 RX ORDER — MONTELUKAST SODIUM 10 MG/1
10 TABLET ORAL DAILY
Refills: 0 | Status: DISCONTINUED | OUTPATIENT
Start: 2025-06-03 | End: 2025-06-04

## 2025-06-03 RX ORDER — ESCITALOPRAM OXALATE 20 MG/1
10 TABLET ORAL DAILY
Refills: 0 | Status: DISCONTINUED | OUTPATIENT
Start: 2025-06-03 | End: 2025-06-04

## 2025-06-03 RX ORDER — FLUPHENAZINE HCL 10 MG
5 TABLET ORAL DAILY
Refills: 0 | Status: DISCONTINUED | OUTPATIENT
Start: 2025-06-03 | End: 2025-06-04

## 2025-06-03 RX ORDER — ATORVASTATIN CALCIUM 80 MG/1
40 TABLET, FILM COATED ORAL AT BEDTIME
Refills: 0 | Status: DISCONTINUED | OUTPATIENT
Start: 2025-06-03 | End: 2025-06-04

## 2025-06-03 RX ORDER — HALOPERIDOL 10 MG/1
2 TABLET ORAL AT BEDTIME
Refills: 0 | Status: DISCONTINUED | OUTPATIENT
Start: 2025-06-03 | End: 2025-06-04

## 2025-06-03 RX ORDER — CARVEDILOL 3.12 MG/1
25 TABLET, FILM COATED ORAL EVERY 12 HOURS
Refills: 0 | Status: DISCONTINUED | OUTPATIENT
Start: 2025-06-03 | End: 2025-06-04

## 2025-06-03 RX ORDER — RISPERIDONE 4 MG
2 TABLET ORAL
Refills: 0 | Status: DISCONTINUED | OUTPATIENT
Start: 2025-06-03 | End: 2025-06-04

## 2025-06-03 RX ORDER — QUETIAPINE FUMARATE 25 MG/1
400 TABLET ORAL AT BEDTIME
Refills: 0 | Status: DISCONTINUED | OUTPATIENT
Start: 2025-06-03 | End: 2025-06-04

## 2025-06-03 RX ADMIN — HEPARIN SODIUM 7500 UNIT(S): 1000 INJECTION INTRAVENOUS; SUBCUTANEOUS at 05:53

## 2025-06-03 RX ADMIN — QUETIAPINE FUMARATE 400 MILLIGRAM(S): 25 TABLET ORAL at 22:06

## 2025-06-03 RX ADMIN — Medication 1 MILLIGRAM(S): at 06:00

## 2025-06-03 RX ADMIN — SEVELAMER HYDROCHLORIDE 800 MILLIGRAM(S): 800 TABLET ORAL at 22:06

## 2025-06-03 RX ADMIN — CARVEDILOL 25 MILLIGRAM(S): 3.12 TABLET, FILM COATED ORAL at 17:34

## 2025-06-03 RX ADMIN — Medication 1 MILLIGRAM(S): at 02:35

## 2025-06-03 RX ADMIN — MONTELUKAST SODIUM 10 MILLIGRAM(S): 10 TABLET ORAL at 17:34

## 2025-06-03 RX ADMIN — HEPARIN SODIUM 7500 UNIT(S): 1000 INJECTION INTRAVENOUS; SUBCUTANEOUS at 17:25

## 2025-06-03 RX ADMIN — Medication 1000 MILLIGRAM(S): at 07:36

## 2025-06-03 RX ADMIN — Medication 1 MILLIGRAM(S): at 07:36

## 2025-06-03 RX ADMIN — Medication 50 MILLIGRAM(S): at 22:06

## 2025-06-03 RX ADMIN — Medication 2 MILLIGRAM(S): at 22:06

## 2025-06-03 RX ADMIN — AMPICILLIN SODIUM AND SULBACTAM SODIUM 200 GRAM(S): 1; .5 INJECTION, POWDER, FOR SOLUTION INTRAMUSCULAR; INTRAVENOUS at 17:25

## 2025-06-03 RX ADMIN — AMPICILLIN SODIUM AND SULBACTAM SODIUM 200 GRAM(S): 1; .5 INJECTION, POWDER, FOR SOLUTION INTRAMUSCULAR; INTRAVENOUS at 11:01

## 2025-06-03 RX ADMIN — Medication 1 MILLIGRAM(S): at 11:00

## 2025-06-03 RX ADMIN — Medication 400 MILLIGRAM(S): at 03:35

## 2025-06-03 RX ADMIN — AMLODIPINE BESYLATE 5 MILLIGRAM(S): 10 TABLET ORAL at 17:34

## 2025-06-03 RX ADMIN — Medication 1 MILLIGRAM(S): at 23:07

## 2025-06-03 RX ADMIN — Medication 1 MILLIGRAM(S): at 22:07

## 2025-06-03 RX ADMIN — Medication 1 MILLIGRAM(S): at 13:52

## 2025-06-03 RX ADMIN — AMPICILLIN SODIUM AND SULBACTAM SODIUM 200 GRAM(S): 1; .5 INJECTION, POWDER, FOR SOLUTION INTRAMUSCULAR; INTRAVENOUS at 05:53

## 2025-06-03 RX ADMIN — Medication 1 MILLIGRAM(S): at 09:50

## 2025-06-03 RX ADMIN — HALOPERIDOL 2 MILLIGRAM(S): 10 TABLET ORAL at 22:06

## 2025-06-03 RX ADMIN — Medication 1 MILLIGRAM(S): at 14:12

## 2025-06-03 RX ADMIN — ATORVASTATIN CALCIUM 40 MILLIGRAM(S): 80 TABLET, FILM COATED ORAL at 22:05

## 2025-06-03 RX ADMIN — Medication 1 MILLIGRAM(S): at 01:35

## 2025-06-03 NOTE — DIETITIAN NUTRITION RISK NOTIFICATION - TREATMENT: THE FOLLOWING DIET HAS BEEN RECOMMENDED
Diet, RICK (06-02-25 @ 11:17) [Active]       PROCEDURE PREFORMED: PARACENTESIS                    PAIN/LOCAL ANESTHESIA MANAGEMENT:           Local: Lidocaine 1% given by Dr Harshil [place]:           ACCESS TIME: 2338          US: 2  [place]: One step           ALBUMIIN 25%- 50cc     SPECIMENS: 1020 cc cloudy white ascitic fluid sent to lab, 7920 cc removed total     REPORT CALLED TO:  [facility] MARNIE CLARK RN

## 2025-06-03 NOTE — DIETITIAN INITIAL EVALUATION ADULT - PERTINENT LABORATORY DATA
06-03    133[L]  |  97  |  44[H]  ----------------------------<  77  3.4[L]   |  30  |  6.52[H]    Ca    9.0      03 Jun 2025 05:20    TPro  8.3  /  Alb  3.2[L]  /  TBili  0.5  /  DBili  x   /  AST  25  /  ALT  16  /  AlkPhos  136[H]  06-02  POCT Blood Glucose.: 95 mg/dL (06-02-25 @ 15:54)  A1C with Estimated Average Glucose Result: 4.5 % (10-17-24 @ 07:55)

## 2025-06-03 NOTE — PROGRESS NOTE ADULT - NS ATTEND AMEND GEN_ALL_CORE FT
Leak ruled out by upper GI. Plan to advance diet to perez fulls. Possible dc home tomorrow after HD.

## 2025-06-03 NOTE — DIETITIAN INITIAL EVALUATION ADULT - PERTINENT MEDS FT
MEDICATIONS  (STANDING):  ampicillin/sulbactam  IVPB      ampicillin/sulbactam  IVPB 3 Gram(s) IV Intermittent every 6 hours  diatrizoate meglumine/diatrizoate sodium. 30 milliLiter(s) Oral once  epoetin cyndie-epbx (RETACRIT) Injectable 4000 Unit(s) IV Push <User Schedule>  heparin   Injectable 7500 Unit(s) SubCutaneous every 12 hours  hydrALAZINE Injectable 25 milliGRAM(s) IV Push every 12 hours  labetalol Injectable 10 milliGRAM(s) IV Push every 12 hours  sodium chloride 0.9%. 1000 milliLiter(s) (50 mL/Hr) IV Continuous <Continuous>    MEDICATIONS  (PRN):  acetaminophen   IVPB .. 1000 milliGRAM(s) IV Intermittent every 8 hours PRN Mild Pain (1 - 3)  albuterol    90 MICROgram(s) HFA Inhaler 2 Puff(s) Inhalation every 6 hours PRN for shortness of breath and/or wheezing  HYDROmorphone  Injectable 1 milliGRAM(s) IV Push every 4 hours PRN Severe Pain (7 - 10)  ondansetron Injectable 4 milliGRAM(s) IV Push every 6 hours PRN Nausea

## 2025-06-03 NOTE — DIETITIAN INITIAL EVALUATION ADULT - ADD RECOMMEND
1) Advance current diet as medically feasible.   2) Monitor labs, weights, BM's, and skin integrity.   3) Recommend nepro QD once medically feasible.

## 2025-06-03 NOTE — DIETITIAN INITIAL EVALUATION ADULT - ORAL INTAKE PTA/DIET HISTORY
46 y/o M admitted with abd pain s/p sleeve gastrectomy on 5/5/25, curerntly on dialysis M,W,F. Pt reported no new food allergies or intolerances. Pt had loss of appetite x 1 week PTA, has been following bariatric diet, consuming adequate protein shakes and vitamins. EMR: 389 lbs - 10/18/24 post dialysis.   No recent episodes of nausea, vomiting, or constipation per pt. Last BM noted on 6/2, was diarrhea per pt. Denies any chewing/swallowing difficulties with current diet consistency. Pt is NPO diet status, awaiting advancement. Food preferences explored and forwarded to dietary. Hyponatremia (133), hypokalemia (3.4), eGFR -100.

## 2025-06-03 NOTE — DIETITIAN INITIAL EVALUATION ADULT - ORAL NUTRITION SUPPLEMENTS
Pt denies diabetes diagnosis, recommend oral supplementation once advanced from NPO diet status. Recommend Nepro (provides 425 kcal, 19 gm protein per 8oz serving) QD as medically feasible.

## 2025-06-04 ENCOUNTER — TRANSCRIPTION ENCOUNTER (OUTPATIENT)
Age: 45
End: 2025-06-04

## 2025-06-04 VITALS
HEART RATE: 70 BPM | SYSTOLIC BLOOD PRESSURE: 162 MMHG | DIASTOLIC BLOOD PRESSURE: 76 MMHG | RESPIRATION RATE: 18 BRPM | OXYGEN SATURATION: 98 %

## 2025-06-04 PROCEDURE — 93005 ELECTROCARDIOGRAM TRACING: CPT

## 2025-06-04 PROCEDURE — 74220 X-RAY XM ESOPHAGUS 1CNTRST: CPT

## 2025-06-04 PROCEDURE — 87340 HEPATITIS B SURFACE AG IA: CPT

## 2025-06-04 PROCEDURE — 85025 COMPLETE CBC W/AUTO DIFF WBC: CPT

## 2025-06-04 PROCEDURE — 80048 BASIC METABOLIC PNL TOTAL CA: CPT

## 2025-06-04 PROCEDURE — 87640 STAPH A DNA AMP PROBE: CPT

## 2025-06-04 PROCEDURE — 74176 CT ABD & PELVIS W/O CONTRAST: CPT

## 2025-06-04 PROCEDURE — 80053 COMPREHEN METABOLIC PANEL: CPT

## 2025-06-04 PROCEDURE — 71045 X-RAY EXAM CHEST 1 VIEW: CPT

## 2025-06-04 PROCEDURE — 36415 COLL VENOUS BLD VENIPUNCTURE: CPT

## 2025-06-04 PROCEDURE — 84484 ASSAY OF TROPONIN QUANT: CPT

## 2025-06-04 PROCEDURE — 70450 CT HEAD/BRAIN W/O DYE: CPT

## 2025-06-04 PROCEDURE — 99285 EMERGENCY DEPT VISIT HI MDM: CPT | Mod: 25

## 2025-06-04 PROCEDURE — 99261: CPT

## 2025-06-04 PROCEDURE — 86706 HEP B SURFACE ANTIBODY: CPT

## 2025-06-04 PROCEDURE — 87641 MR-STAPH DNA AMP PROBE: CPT

## 2025-06-04 PROCEDURE — 85730 THROMBOPLASTIN TIME PARTIAL: CPT

## 2025-06-04 PROCEDURE — 85610 PROTHROMBIN TIME: CPT

## 2025-06-04 PROCEDURE — 82962 GLUCOSE BLOOD TEST: CPT

## 2025-06-04 RX ADMIN — EPOETIN ALFA 4000 UNIT(S): 10000 SOLUTION INTRAVENOUS; SUBCUTANEOUS at 11:57

## 2025-06-04 RX ADMIN — AMPICILLIN SODIUM AND SULBACTAM SODIUM 200 GRAM(S): 1; .5 INJECTION, POWDER, FOR SOLUTION INTRAMUSCULAR; INTRAVENOUS at 14:26

## 2025-06-04 RX ADMIN — HEPARIN SODIUM 7500 UNIT(S): 1000 INJECTION INTRAVENOUS; SUBCUTANEOUS at 06:47

## 2025-06-04 RX ADMIN — Medication 1 APPLICATION(S): at 06:47

## 2025-06-04 RX ADMIN — AMPICILLIN SODIUM AND SULBACTAM SODIUM 200 GRAM(S): 1; .5 INJECTION, POWDER, FOR SOLUTION INTRAMUSCULAR; INTRAVENOUS at 06:46

## 2025-06-04 RX ADMIN — Medication 50 MILLIGRAM(S): at 06:47

## 2025-06-04 RX ADMIN — AMLODIPINE BESYLATE 5 MILLIGRAM(S): 10 TABLET ORAL at 06:47

## 2025-06-04 RX ADMIN — SEVELAMER HYDROCHLORIDE 800 MILLIGRAM(S): 800 TABLET ORAL at 06:46

## 2025-06-04 RX ADMIN — MONTELUKAST SODIUM 10 MILLIGRAM(S): 10 TABLET ORAL at 13:49

## 2025-06-04 RX ADMIN — Medication 5 MILLIGRAM(S): at 13:50

## 2025-06-04 RX ADMIN — ESCITALOPRAM OXALATE 10 MILLIGRAM(S): 20 TABLET ORAL at 13:49

## 2025-06-04 RX ADMIN — Medication 1 MILLIGRAM(S): at 11:03

## 2025-06-04 RX ADMIN — AMPICILLIN SODIUM AND SULBACTAM SODIUM 200 GRAM(S): 1; .5 INJECTION, POWDER, FOR SOLUTION INTRAMUSCULAR; INTRAVENOUS at 00:28

## 2025-06-04 RX ADMIN — SEVELAMER HYDROCHLORIDE 800 MILLIGRAM(S): 800 TABLET ORAL at 13:49

## 2025-06-04 RX ADMIN — CARVEDILOL 25 MILLIGRAM(S): 3.12 TABLET, FILM COATED ORAL at 06:47

## 2025-06-04 RX ADMIN — Medication 2 MILLIGRAM(S): at 06:47

## 2025-06-04 RX ADMIN — Medication 50 MILLIGRAM(S): at 13:49

## 2025-06-04 NOTE — PROGRESS NOTE ADULT - REASON FOR ADMISSION
postop sleeve gastrectomy

## 2025-06-04 NOTE — DISCHARGE NOTE NURSING/CASE MANAGEMENT/SOCIAL WORK - NSDCPEFALRISK_GEN_ALL_CORE
For information on Fall & Injury Prevention, visit: [email or web address] OR  [email or web address] OR  https://www.cdc.gov/steadi/patient.html
stated

## 2025-06-04 NOTE — DISCHARGE NOTE PROVIDER - NSDCFUSCHEDAPPT_GEN_ALL_CORE_FT
Luis Alberto Bueno  [facility] Physician [facility]  [address] [facility]  Scheduled Appointment: 06/10/2025

## 2025-06-04 NOTE — PROGRESS NOTE ADULT - SUBJECTIVE AND OBJECTIVE BOX
[facility] : Progress Note :: [phone], (office [phone]),   Dr La / Dr Hui / Dr Barber / Dr Villalobos / Dr Hang CASTELLANO / Dr Mehta / Dr Sanchez / Dr Alber dumont  _____________________________________________________________________________________________  seen on HD- stable.     penicillins (Swelling)  shellfish (Anaphylaxis)  [place] (Anaphylaxis)  fish (Unknown)  penicillin (Anaphylaxis)  aspirin (Swelling)  Seafood (Hives)  shellfish (Rash)    Hospital Medications:   MEDICATIONS  (STANDING):  amLODIPine   Tablet 5 milliGRAM(s) Oral daily  ampicillin/sulbactam  IVPB      ampicillin/sulbactam  IVPB 3 Gram(s) IV Intermittent every 6 hours  atorvastatin 40 milliGRAM(s) Oral at bedtime  carvedilol 25 milliGRAM(s) Oral every 12 hours  chlorhexidine 2% Cloths 1 Application(s) Topical <User Schedule>  diatrizoate meglumine/diatrizoate sodium. 30 milliLiter(s) Oral once  epoetin cyndie-epbx (RETACRIT) Injectable 4000 Unit(s) IV Push <User Schedule>  escitalopram 10 milliGRAM(s) Oral daily  fluPHENAZine 5 milliGRAM(s) Oral daily  haloperidol     Tablet 2 milliGRAM(s) Oral at bedtime  heparin   Injectable 7500 Unit(s) SubCutaneous every 12 hours  hydrALAZINE 50 milliGRAM(s) Oral three times a day  montelukast 10 milliGRAM(s) Oral daily  QUEtiapine 400 milliGRAM(s) Oral at bedtime  risperiDONE   Tablet 2 milliGRAM(s) Oral two times a day  sevelamer carbonate 800 milliGRAM(s) Oral every 8 hours        VITALS:  T(F): 98.2 (06-04-25 @ 09:24), Max: 98.8 (06-03-25 @ 20:16)  HR: 69 (06-04-25 @ 09:24)  BP: 147/79 (06-04-25 @ 09:24)  RR: 16 (06-04-25 @ 09:24)  SpO2: 97% (06-04-25 @ 09:24)  Wt(kg): --    06-03 @ 07:01  -  06-04 @ 07:00  --------------------------------------------------------  IN: 550 mL / OUT: 0 mL / NET: 550 mL        PHYSICAL EXAM:  Constitutional: NAD  HEENT: anicteric sclera, oropharynx clear.  Neck: No JVD  Respiratory: CTAB, no wheezes, rales or rhonchi  Cardiovascular: S1, S2, RRR  Gastrointestinal: BS+, soft, NT/ND  Extremities:No peripheral edema  Neurological: A/O x 3, no focal deficits  : No CVA tenderness. No hernandez.   Skin: No rashes  Vascular Access: AVF cannulated     LABS:  06-03    133[L]  |  97  |  44[H]  ----------------------------<  77  3.4[L]   |  30  |  6.52[H]    Ca    9.0      03 Jun 2025 05:20      Creatinine Trend: 6.52 <--, 7.58 <--                        9.2    5.18  )-----------( 193      ( 03 Jun 2025 05:20 )             29.2     Urine Studies:  Urinalysis Basic - ( 03 Jun 2025 05:20 )    Color:  / Appearance:  / SG:  / pH:   Gluc: 77 mg/dL / Ketone:   / Bili:  / Urobili:    Blood:  / Protein:  / Nitrite:    Leuk Esterase:  / RBC:  / WBC    Sq Epi:  / Non Sq Epi:  / Bacteria:         RADIOLOGY & ADDITIONAL STUDIES:  
INTERVAL HPI/OVERNIGHT EVENTS:  Pt resting comfortably. No acute complaints overnight. Pt admits to continued pain, worsening, localized to epigastric area. No nausea, vomiting, fever, chills.     MEDICATIONS  (STANDING):  ampicillin/sulbactam  IVPB      ampicillin/sulbactam  IVPB 3 Gram(s) IV Intermittent every 6 hours  diatrizoate meglumine/diatrizoate sodium. 30 milliLiter(s) Oral once  epoetin cyndie-epbx (RETACRIT) Injectable 4000 Unit(s) IV Push <User Schedule>  heparin   Injectable 7500 Unit(s) SubCutaneous every 12 hours  hydrALAZINE Injectable 25 milliGRAM(s) IV Push every 12 hours  labetalol Injectable 10 milliGRAM(s) IV Push every 12 hours  sodium chloride 0.9%. 1000 milliLiter(s) (50 mL/Hr) IV Continuous <Continuous>    MEDICATIONS  (PRN):  acetaminophen   IVPB .. 1000 milliGRAM(s) IV Intermittent every 8 hours PRN Mild Pain (1 - 3)  albuterol    90 MICROgram(s) HFA Inhaler 2 Puff(s) Inhalation every 6 hours PRN for shortness of breath and/or wheezing  HYDROmorphone  Injectable 1 milliGRAM(s) IV Push every 4 hours PRN Severe Pain (7 - 10)  ondansetron Injectable 4 milliGRAM(s) IV Push every 6 hours PRN Nausea      Vital Signs Last 24 Hrs  T(C): 36.3 (03 Jun 2025 05:23), Max: 36.9 (02 Jun 2025 11:20)  T(F): 97.4 (03 Jun 2025 05:23), Max: 98.4 (02 Jun 2025 11:20)  HR: 59 (03 Jun 2025 05:23) (59 - 68)  BP: 133/68 (03 Jun 2025 05:23) (112/65 - 150/72)  BP(mean): 90 (03 Jun 2025 05:23) (90 - 98)  RR: 16 (03 Jun 2025 05:23) (16 - 18)  SpO2: 95% (03 Jun 2025 05:23) (95% - 100%)    Parameters below as of 03 Jun 2025 05:23  Patient On (Oxygen Delivery Method): nasal cannula  O2 Flow (L/min): 4      Physical:  General: A&Ox3. NAD.  Resp: Unlabored breathing. Equal chest rise bilaterally.   Abdomen: Soft nondistended, +tenderness to palpation w/ voluntary guarding to epigastric area. Remaining abd nontender. No rebound tenderness, no palpable masses. No peritonitic signs.   Extremities: No calf tenderness b/l.     I&O's Detail    02 Jun 2025 07:01  -  03 Jun 2025 07:00  --------------------------------------------------------  IN:    Other (mL): 600 mL    sodium chloride 0.9%: 500 mL  Total IN: 1100 mL    OUT:    Other (mL): 3600 mL  Total OUT: 3600 mL    Total NET: -2500 mL          LABS:                        9.2    5.18  )-----------( 193      ( 03 Jun 2025 05:20 )             29.2             06-03    133[L]  |  97  |  44[H]  ----------------------------<  77  3.4[L]   |  30  |  6.52[H]    Ca    9.0      03 Jun 2025 05:20    TPro  8.3  /  Alb  3.2[L]  /  TBili  0.5  /  DBili  x   /  AST  25  /  ALT  16  /  AlkPhos  136[H]  06-02      45y.o. Male
[facility] : Progress Note :: [phone], (office [phone]),   Dr La / Dr Hui / Dr Barber / Dr Villalobos / Dr Hang CASTELLANO / Dr Mehta / Dr Sanchez / Dr Alber dumont  _____________________________________________________________________________________________  esophagram with no leak    penicillins (Swelling)  shellfish (Anaphylaxis)  [place] (Anaphylaxis)  fish (Unknown)  penicillin (Anaphylaxis)  aspirin (Swelling)  Seafood (Hives)  shellfish (Rash)    Hospital Medications:   MEDICATIONS  (STANDING):  amLODIPine   Tablet 5 milliGRAM(s) Oral daily  ampicillin/sulbactam  IVPB      ampicillin/sulbactam  IVPB 3 Gram(s) IV Intermittent every 6 hours  atorvastatin 40 milliGRAM(s) Oral at bedtime  carvedilol 25 milliGRAM(s) Oral every 12 hours  chlorhexidine 2% Cloths 1 Application(s) Topical <User Schedule>  diatrizoate meglumine/diatrizoate sodium. 30 milliLiter(s) Oral once  epoetin cyndie-epbx (RETACRIT) Injectable 4000 Unit(s) IV Push <User Schedule>  escitalopram 10 milliGRAM(s) Oral daily  fluPHENAZine 5 milliGRAM(s) Oral daily  haloperidol     Tablet 2 milliGRAM(s) Oral at bedtime  heparin   Injectable 7500 Unit(s) SubCutaneous every 12 hours  hydrALAZINE 50 milliGRAM(s) Oral three times a day  montelukast 10 milliGRAM(s) Oral daily  QUEtiapine 400 milliGRAM(s) Oral at bedtime  risperiDONE   Tablet 2 milliGRAM(s) Oral two times a day  sevelamer carbonate 800 milliGRAM(s) Oral every 8 hours        VITALS:  T(F): 98.1 (06-03-25 @ 15:17), Max: 98.1 (06-02-25 @ 19:14)  HR: 58 (06-03-25 @ 15:17)  BP: 155/85 (06-03-25 @ 15:17)  RR: 17 (06-03-25 @ 15:17)  SpO2: 96% (06-03-25 @ 15:17)  Wt(kg): --    06-02 @ 07:01  -  06-03 @ 07:00  --------------------------------------------------------  IN: 1100 mL / OUT: 3600 mL / NET: -2500 mL    06-03 @ 07:01  -  06-03 @ 18:05  --------------------------------------------------------  IN: 550 mL / OUT: 0 mL / NET: 550 mL        PHYSICAL EXAM:  Constitutional: NAD  HEENT: anicteric sclera, oropharynx clear  Neck: No JVD  Respiratory: CTAB, no wheezes, rales or rhonchi  Cardiovascular: S1, S2, RRR  Gastrointestinal: BS+, soft, NT/ND  Extremities: No peripheral edema  Neurological: A/O x 3, no focal deficits  Vascular Access: AVF wit thrill and bruit     LABS:  06-03    133[L]  |  97  |  44[H]  ----------------------------<  77  3.4[L]   |  30  |  6.52[H]    Ca    9.0      03 Jun 2025 05:20    TPro  8.3  /  Alb  3.2[L]  /  TBili  0.5  /  DBili      /  AST  25  /  ALT  16  /  AlkPhos  136[H]  06-02    Creatinine Trend: 6.52 <--, 7.58 <--                        9.2    5.18  )-----------( 193      ( 03 Jun 2025 05:20 )             29.2     Urine Studies:  Urinalysis Basic - ( 03 Jun 2025 05:20 )    Color:  / Appearance:  / SG:  / pH:   Gluc: 77 mg/dL / Ketone:   / Bili:  / Urobili:    Blood:  / Protein:  / Nitrite:    Leuk Esterase:  / RBC:  / WBC    Sq Epi:  / Non Sq Epi:  / Bacteria:         RADIOLOGY & ADDITIONAL STUDIES:  
Pt s- new complaints.  ICU Vital Signs Last 24 Hrs  T(C): 36.7 (04 Jun 2025 05:05), Max: 37.1 (03 Jun 2025 20:16)  T(F): 98 (04 Jun 2025 05:05), Max: 98.8 (03 Jun 2025 20:16)  HR: 65 (04 Jun 2025 05:05) (56 - 65)  BP: 146/67 (04 Jun 2025 05:05) (146/67 - 175/85)  BP(mean): 113 (03 Jun 2025 20:16) (88 - 115)  ABP: --  ABP(mean): --  RR: 18 (04 Jun 2025 05:05) (16 - 18)  SpO2: 98% (04 Jun 2025 05:05) (96% - 99%)    O2 Parameters below as of 04 Jun 2025 05:05  Patient On (Oxygen Delivery Method): nasal cannula  O2 Flow (L/min): 2      Alert nad  Abd: soft mild epigastric tenderness, no rebound                        9.2    5.18  )-----------( 193      ( 03 Jun 2025 05:20 )             29.2   06-03    133[L]  |  97  |  44[H]  ----------------------------<  77  3.4[L]   |  30  |  6.52[H]    Ca    9.0      03 Jun 2025 05:20    < from: Xray Esophagram Single Contrast (06.03.25 @ 09:14) >    IMPRESSION:  No evidence of contrast extravasation or obstruction.      Thank you for the courtesy of this referral.    < end of copied text >

## 2025-06-04 NOTE — PROGRESS NOTE ADULT - ASSESSMENT
45M with mult comorbidities s/p sleeve gastrectomy 5/5/25 now p/w abd collection adjacent to the stomach  VSS, afebrile, HR WNL     Plan    - strict NPO, light IVF  - plan for UGI this AM   - IV antibiotics  - pain / nausea control prn   - DVT ppx     HTN   - monitor BP   - PO to IV conversion     HD  - follow nephrology recommendations   - continue RUE precautions 
abd pain  hx sleeve gastrectemy  no leak per ugi series    trail clrs  oob
# ESRD. HD in AM   # abdominal pain- s/p  sleeve gastrectomy.  MX per surgery. on ABX  # anemia of CKD epoge on HD   # HTN. BP stable.   # CKDMBD. sevelamer 800mg three times per day  D/C planning 
# ESRD. seen on HD stable  # s/p sleeve gasterectomy- diet being advanced  # anemia of CKD epogen on HD   # HTN. BP stable.   # CKDMBD. sevelamer 800mg three times per day  D/C planning

## 2025-06-04 NOTE — DISCHARGE NOTE PROVIDER - CARE PROVIDER_API CALL
Luis Alberto Bueno [facility]  Surgery  [address] [place] [id number]  Phone: [phone]  Fax: [phone]  Follow Up Time:

## 2025-06-04 NOTE — DISCHARGE NOTE NURSING/CASE MANAGEMENT/SOCIAL WORK - PATIENT PORTAL LINK FT
You can access the FollowMyHealth Patient Portal offered by [facility] by registering at the following website: [email or web address]. By joining [employer]’s FollowMyHealth portal, you will also be able to view your health information using other applications (apps) compatible with our system.

## 2025-06-04 NOTE — DISCHARGE NOTE NURSING/CASE MANAGEMENT/SOCIAL WORK - NSDCVIVACCINE_GEN_ALL_CORE_FT
influenza, injectable, quadrivalent, preservative free; 07-Apr-2016 12:13; Halle Ellsworth (RN); [employer]; [id number]; IntraMuscular; Deltoid Left.; 0.5 milliLiter(s); VIS (VIS Published: 07-Aug-2015, VIS Presented: 07-Apr-2016);   influenza, injectable, quadrivalent, preservative free; 06-Dec-2018 14:41; Rhonda Kern (RN); [employer]; [id number] (Exp. Date: 30-Jun-2019); IntraMuscular; Deltoid Right.; 0.5 milliLiter(s); VIS (VIS Published: 07-Aug-2015, VIS Presented: 06-Dec-2018);   influenza, injectable, quadrivalent, preservative free; 05-Mar-2019 16:51; Jace Best (RN); [employer]; [id number] (Exp. Date: 30-Jun-2019); IntraMuscular; Deltoid Right.; 0.5 milliLiter(s); VIS (VIS Published: 07-Aug-2015, VIS Presented: 05-Mar-2019);   influenza, injectable, quadrivalent, preservative free; 19-Dec-2020 12:14; Delmer Weinberg (RN); [employer]; [id number] (Exp. Date: 30-Jun-2021); IntraMuscular; Deltoid Right.; 0.5 milliLiter(s); VIS (VIS Published: 15-Aug-2019, VIS Presented: 19-Dec-2020);

## 2025-06-04 NOTE — DISCHARGE NOTE PROVIDER - NSDCCPCAREPLAN_GEN_ALL_CORE_FT
PRINCIPAL DISCHARGE DIAGNOSIS  Diagnosis: Abdominal pain  Assessment and Plan of Treatment: admitted for possible complication post-operatively

## 2025-06-04 NOTE — DISCHARGE NOTE PROVIDER - DETAILS OF MALNUTRITION DIAGNOSIS/DIAGNOSES
This patient has been assessed with a concern for Malnutrition and was treated during this hospitalization for the following Nutrition diagnosis/diagnoses:     -  06/03/2025: Morbid obesity (BMI > 40)

## 2025-06-04 NOTE — DISCHARGE NOTE PROVIDER - NSDCMRMEDTOKEN_GEN_ALL_CORE_FT
Albuterol (Eqv-ProAir HFA) 90 mcg/inh inhalation aerosol: 2 puff(s) inhaled every 6 hours as needed for  shortness of breath and/or wheezing  AMLODIPINE 5MG TAB: 1 tab(s) orally once a day Crush tablet. Mix with liquid, or applesauce when full liquid allowed  atorvastatin: 40 milligram(s) orally once a day (at bedtime) Resume medications.   Crush all tablets  azelastine nasal:   budesonide/glycopyrrolate/formoterol 160 mcg-9 mcg-4.8 mcg/inh inhalation aerosol: 2 puff(s) inhaled 2 times a day  CARVEDILOL 25MG TAB: 1 tab(s) orally 2 times a day Crush tablet. Mix with liquid, or applesauce when full liquid allowed  ESCITALOPRAM 10MG TAB: 1 tab(s) orally once a day Crush tablet. Mix with liquid, or applesauce when full liquid allowed  fluPHENAZine 5 mg oral tablet: 1 tab(s) orally once a day Crush tablet. Mix with liquid, or applesauce when full liquid allowed  Haldol 2 mg oral tablet: 3 tab(s) orally once a day (at bedtime) Crush tablet. Mix with liquid, or applesauce when full liquid allowed  hydrALAZINE 50 mg oral tablet: 1 tab(s) orally 3 times a day Crush tablet. Mix with liquid, or applesauce when full liquid allowed  montelukast 10 mg oral tablet: 1 tab(s) orally once a day Crush tablet. Mix with liquid, or applesauce when full liquid allowed  PARoxetine 20 mg oral tablet: 1 tab(s) orally once a day (in the morning) Crush tablet. Mix with liquid, or applesauce when full liquid allowed  QUEtiapine 200 mg oral tablet: 2 tab(s) orally once a day (at bedtime) Crush tablet. Mix with liquid, or applesauce when full liquid allowed  Renvela 0.8 g oral powder for reconstitution: 1 packet(s) orally 3 times a day  risperiDONE 2 mg oral tablet: 1 tab(s) orally 2 times a day Crush tablet. Mix with liquid, or applesauce when full liquid allowed  senna (sennosides) 8.6 mg oral tablet: 2 tab(s) orally once a day (at bedtime)

## 2025-06-04 NOTE — DISCHARGE NOTE PROVIDER - HOSPITAL COURSE
HPI:  45 year old morbidly obese male with pmhx of anxiety, bipolar disorder, schizophrenia, ESRD on HD through [facility] [place] (M,W, F), asthma with COPD, hypertension, hyperlipidemia, migraine and cholecystitis presents with c/o failure to loose despite several dietary modalities, calorie reduction, portion control. Pt underwent Robotic Assisted Laparoscopic Sleeve Gastrectomy possible open on 5/5/2025. Pt discharged remained admitted from 5/5/2025 - 5/9/2025. During admission, pt found to have mild hematoma adjacent to sleeve gastrectomy, no extravasation of oral contract. Pt was sent home with PICC line for long term IV abx therapy. PICC line has been removed and he was taking oral abx at home - cannot recall the name. Pt states he has had no issues at home since surgery until last week. He began with have diarrhea with loss of appetite. Pt states he started having severe abdominal pain this morning during HD, was sent in to the ER after 1 hr of HD. Admits abdominal pain is localized to the upper abd/epigastric region. Denies vomiting, fever, chills. Denies bloody bowel movements. Pt admits to feeling better with pain medications administered in the ED. X-ray esophagram showed no evidence of contrast extravasation or obstruction.       (02 Jun 2025 12:56)

## 2025-06-04 NOTE — DISCHARGE NOTE NURSING/CASE MANAGEMENT/SOCIAL WORK - FINANCIAL ASSISTANCE
[facility] provides services at a reduced cost to those who are determined to be eligible through [facility]’s financial assistance program. Information regarding [facility]’s financial assistance program can be found by going to [email or web address] or by calling [phone].

## 2025-06-04 NOTE — ASU PATIENT PROFILE, ADULT - FALL HARM RISK - ATTEMPT OOB
----- Message from Isis GREGORY RN sent at 5/27/2025 10:58 AM CDT -----  Please contact pt for PP depression and anxiety screening.   No

## 2025-06-10 ENCOUNTER — APPOINTMENT (OUTPATIENT)
Dept: SURGERY | Facility: CLINIC | Age: 45
End: 2025-06-10
Payer: MEDICARE

## 2025-06-10 VITALS
HEIGHT: 78 IN | WEIGHT: 315 LBS | BODY MASS INDEX: 36.45 KG/M2 | SYSTOLIC BLOOD PRESSURE: 113 MMHG | HEART RATE: 66 BPM | DIASTOLIC BLOOD PRESSURE: 58 MMHG

## 2025-06-10 PROCEDURE — 99024 POSTOP FOLLOW-UP VISIT: CPT

## 2025-06-13 NOTE — DISCHARGE NOTE NURSING/CASE MANAGEMENT/SOCIAL WORK - CAREGIVER ADDRESS
Midline Catheter Insertion Note    Catheter type: 4F  : Bard  Power injectable: Yes  LOT# [id number]                                                                                                                                                                                                                      Procedure assisted by: LENY Schilling RN  Time out was preformed, confirming the patient's first and last name, date of birth, procedure, and correct site prior to state of procedure.    Patient was placed with HOB 30 degrees. Patient placement site was prepped with chlorhexidine solution, then draped using maximum sterile barrier protection. The area was injected with 2  ml of 1% lidocaine. Using the Bard Site Rite 8, the catheter was placed using the Modified Seldinger Technique. Strict adherence to outline aseptic technique including handwashing, glove and gown, utilizing mask and cap, plus draping the patient with a sterile drape was observed. Upon completion of line placement, the insertion site was covered with a sterile occlusive CHG dressing. Pt tolerated procedure well.     All materials used for catheter insertion, including the intact guide wires, were accounted for at the end of the procedure.  Number of attempts: 1  Complications/Comments: None    Emergency Placement: No    Site: New  Anatomical Site of insertion: Right basilic  Catheter size/length: 4F, 20cm  US guided Bard single lumen power midline placed   [address], [address] [facility] [address]

## 2025-06-30 NOTE — PATIENT PROFILE ADULT - LAST ORAL INTAKE
The labs were reviewed. Please inform patient that labs were abnormal.  Serum creatinine level continues to stay elevated.  Is currently at 2.25.  I would like patient to see his nephrologist.  Cholesterol continues to elevated.  But appears that he has not been able to take the Nexletol, but states that he does not have some samples.  I would like him to get started on this and take regularly.  
19-Nov-2019

## 2025-07-08 NOTE — PATIENT PROFILE ADULT - NSPROPASSIVESMOKEEXPOSURE_GEN_A_NUR
Follow up Dr Mancia for 30 min appointment and new CSA between 10/8 and 10/15/25    Take rosuvastatin 10 mg once a day    Get fasting labs in 2 months or before next appointment       No

## 2025-07-17 NOTE — PROGRESS NOTE ADULT - PROBLEM/PLAN-7
Low volume ascites seen. Paracentesis not performed.    
DISPLAY PLAN FREE TEXT
DISPLAY PLAN FREE TEXT

## 2025-07-23 ENCOUNTER — INPATIENT (INPATIENT)
Facility: HOSPITAL | Age: 45
LOS: 2 days | Discharge: HOME | DRG: 391 | End: 2025-07-26
Attending: STUDENT IN AN ORGANIZED HEALTH CARE EDUCATION/TRAINING PROGRAM | Admitting: STUDENT IN AN ORGANIZED HEALTH CARE EDUCATION/TRAINING PROGRAM
Payer: MEDICARE

## 2025-07-23 VITALS
DIASTOLIC BLOOD PRESSURE: 66 MMHG | WEIGHT: 315 LBS | TEMPERATURE: 98 F | OXYGEN SATURATION: 100 % | HEART RATE: 89 BPM | RESPIRATION RATE: 19 BRPM | HEIGHT: 78 IN | SYSTOLIC BLOOD PRESSURE: 130 MMHG

## 2025-07-23 DIAGNOSIS — Z98.890 OTHER SPECIFIED POSTPROCEDURAL STATES: Chronic | ICD-10-CM

## 2025-07-23 LAB
ALBUMIN SERPL ELPH-MCNC: 3.5 G/DL — SIGNIFICANT CHANGE UP (ref 3.5–5)
ALBUMIN SERPL ELPH-MCNC: 3.6 G/DL — SIGNIFICANT CHANGE UP (ref 3.5–5)
ALP SERPL-CCNC: 135 U/L — HIGH (ref 40–120)
ALP SERPL-CCNC: 138 U/L — HIGH (ref 40–120)
ALT FLD-CCNC: 20 U/L DA — SIGNIFICANT CHANGE UP (ref 10–60)
ALT FLD-CCNC: 20 U/L DA — SIGNIFICANT CHANGE UP (ref 10–60)
ANION GAP SERPL CALC-SCNC: 6 MMOL/L — SIGNIFICANT CHANGE UP (ref 5–17)
ANION GAP SERPL CALC-SCNC: 7 MMOL/L — SIGNIFICANT CHANGE UP (ref 5–17)
AST SERPL-CCNC: 28 U/L — SIGNIFICANT CHANGE UP (ref 10–40)
AST SERPL-CCNC: 32 U/L — SIGNIFICANT CHANGE UP (ref 10–40)
BASE EXCESS BLDV CALC-SCNC: 13 MMOL/L — SIGNIFICANT CHANGE UP
BASE EXCESS BLDV CALC-SCNC: 13.1 MMOL/L — SIGNIFICANT CHANGE UP
BASOPHILS # BLD AUTO: 0.02 K/UL — SIGNIFICANT CHANGE UP (ref 0–0.2)
BASOPHILS NFR BLD AUTO: 0.4 % — SIGNIFICANT CHANGE UP (ref 0–2)
BILIRUB SERPL-MCNC: 0.8 MG/DL — SIGNIFICANT CHANGE UP (ref 0.2–1.2)
BILIRUB SERPL-MCNC: 0.8 MG/DL — SIGNIFICANT CHANGE UP (ref 0.2–1.2)
BLOOD GAS COMMENTS, VENOUS: SIGNIFICANT CHANGE UP
BUN SERPL-MCNC: 15 MG/DL — SIGNIFICANT CHANGE UP (ref 7–18)
BUN SERPL-MCNC: 17 MG/DL — SIGNIFICANT CHANGE UP (ref 7–18)
CA-I SERPL-SCNC: SIGNIFICANT CHANGE UP MMOL/L (ref 1.15–1.33)
CA-I SERPL-SCNC: SIGNIFICANT CHANGE UP MMOL/L (ref 1.15–1.33)
CALCIUM SERPL-MCNC: 9.8 MG/DL — SIGNIFICANT CHANGE UP (ref 8.4–10.5)
CALCIUM SERPL-MCNC: 9.9 MG/DL — SIGNIFICANT CHANGE UP (ref 8.4–10.5)
CHLORIDE SERPL-SCNC: 95 MMOL/L — LOW (ref 96–108)
CHLORIDE SERPL-SCNC: 96 MMOL/L — SIGNIFICANT CHANGE UP (ref 96–108)
CO2 SERPL-SCNC: 29 MMOL/L — SIGNIFICANT CHANGE UP (ref 22–31)
CO2 SERPL-SCNC: 31 MMOL/L — SIGNIFICANT CHANGE UP (ref 22–31)
CREAT SERPL-MCNC: 3.91 MG/DL — HIGH (ref 0.5–1.3)
CREAT SERPL-MCNC: 3.96 MG/DL — HIGH (ref 0.5–1.3)
EGFR: 18 ML/MIN/1.73M2 — LOW
EGFR: 18 ML/MIN/1.73M2 — LOW
EGFR: 19 ML/MIN/1.73M2 — LOW
EGFR: 19 ML/MIN/1.73M2 — LOW
EOSINOPHIL # BLD AUTO: 0.09 K/UL — SIGNIFICANT CHANGE UP (ref 0–0.5)
EOSINOPHIL NFR BLD AUTO: 1.6 % — SIGNIFICANT CHANGE UP (ref 0–6)
GAS PNL BLDV: 131 MMOL/L — LOW (ref 136–145)
GAS PNL BLDV: 133 MMOL/L — LOW (ref 136–145)
GAS PNL BLDV: SIGNIFICANT CHANGE UP
GAS PNL BLDV: SIGNIFICANT CHANGE UP
GLUCOSE BLDV-MCNC: 111 MG/DL — HIGH (ref 70–99)
GLUCOSE BLDV-MCNC: 115 MG/DL — HIGH (ref 70–99)
GLUCOSE SERPL-MCNC: 106 MG/DL — HIGH (ref 70–99)
GLUCOSE SERPL-MCNC: 109 MG/DL — HIGH (ref 70–99)
HCO3 BLDV-SCNC: 30 MMOL/L — HIGH (ref 22–29)
HCO3 BLDV-SCNC: 34 MMOL/L — HIGH (ref 22–29)
HCT VFR BLD CALC: 39.5 % — SIGNIFICANT CHANGE UP (ref 39–50)
HGB BLD-MCNC: 13.4 G/DL — SIGNIFICANT CHANGE UP (ref 13–17)
HIV 1 & 2 AB SERPL IA.RAPID: SIGNIFICANT CHANGE UP
IMM GRANULOCYTES NFR BLD AUTO: 0.2 % — SIGNIFICANT CHANGE UP (ref 0–0.9)
LACTATE BLDV-MCNC: 1.7 MMOL/L — SIGNIFICANT CHANGE UP (ref 0.5–2)
LACTATE BLDV-MCNC: 1.9 MMOL/L — SIGNIFICANT CHANGE UP (ref 0.5–2)
LIDOCAIN IGE QN: 45 U/L — SIGNIFICANT CHANGE UP (ref 13–75)
LYMPHOCYTES # BLD AUTO: 1.04 K/UL — SIGNIFICANT CHANGE UP (ref 1–3.3)
LYMPHOCYTES # BLD AUTO: 18.4 % — SIGNIFICANT CHANGE UP (ref 13–44)
MAGNESIUM SERPL-MCNC: 2 MG/DL — SIGNIFICANT CHANGE UP (ref 1.6–2.6)
MCHC RBC-ENTMCNC: 31.6 PG — SIGNIFICANT CHANGE UP (ref 27–34)
MCHC RBC-ENTMCNC: 33.9 G/DL — SIGNIFICANT CHANGE UP (ref 32–36)
MCV RBC AUTO: 93.2 FL — SIGNIFICANT CHANGE UP (ref 80–100)
MONOCYTES # BLD AUTO: 0.9 K/UL — SIGNIFICANT CHANGE UP (ref 0–0.9)
MONOCYTES NFR BLD AUTO: 15.9 % — HIGH (ref 2–14)
NEUTROPHILS # BLD AUTO: 3.59 K/UL — SIGNIFICANT CHANGE UP (ref 1.8–7.4)
NEUTROPHILS NFR BLD AUTO: 63.5 % — SIGNIFICANT CHANGE UP (ref 43–77)
NRBC BLD AUTO-RTO: 0 /100 WBCS — SIGNIFICANT CHANGE UP (ref 0–0)
PCO2 BLDV: 20 MMHG — LOW (ref 42–55)
PCO2 BLDV: 30 MMHG — LOW (ref 42–55)
PH BLDV: 7.66 — HIGH (ref 7.32–7.43)
PH BLDV: 7.79 — HIGH (ref 7.32–7.43)
PHOSPHATE SERPL-MCNC: 1.7 MG/DL — LOW (ref 2.5–4.5)
PLATELET # BLD AUTO: 188 K/UL — SIGNIFICANT CHANGE UP (ref 150–400)
PO2 BLDV: 179 MMHG — SIGNIFICANT CHANGE UP
PO2 BLDV: 43 MMHG — SIGNIFICANT CHANGE UP
POTASSIUM BLDV-SCNC: 3.4 MMOL/L — LOW (ref 3.5–5.1)
POTASSIUM BLDV-SCNC: 6.2 MMOL/L — CRITICAL HIGH (ref 3.5–5.1)
POTASSIUM SERPL-MCNC: 3.5 MMOL/L — SIGNIFICANT CHANGE UP (ref 3.5–5.3)
POTASSIUM SERPL-MCNC: 3.6 MMOL/L — SIGNIFICANT CHANGE UP (ref 3.5–5.3)
POTASSIUM SERPL-SCNC: 3.5 MMOL/L — SIGNIFICANT CHANGE UP (ref 3.5–5.3)
POTASSIUM SERPL-SCNC: 3.6 MMOL/L — SIGNIFICANT CHANGE UP (ref 3.5–5.3)
PROT SERPL-MCNC: 8.4 G/DL — HIGH (ref 6–8.3)
PROT SERPL-MCNC: 8.8 G/DL — HIGH (ref 6–8.3)
RBC # BLD: 4.24 M/UL — SIGNIFICANT CHANGE UP (ref 4.2–5.8)
RBC # FLD: 14.5 % — SIGNIFICANT CHANGE UP (ref 10.3–14.5)
SAO2 % BLDV: 76.1 % — SIGNIFICANT CHANGE UP
SAO2 % BLDV: 99.8 % — SIGNIFICANT CHANGE UP
SODIUM SERPL-SCNC: 132 MMOL/L — LOW (ref 135–145)
SODIUM SERPL-SCNC: 132 MMOL/L — LOW (ref 135–145)
WBC # BLD: 5.65 K/UL — SIGNIFICANT CHANGE UP (ref 3.8–10.5)
WBC # FLD AUTO: 5.65 K/UL — SIGNIFICANT CHANGE UP (ref 3.8–10.5)

## 2025-07-23 PROCEDURE — 99222 1ST HOSP IP/OBS MODERATE 55: CPT

## 2025-07-23 PROCEDURE — 71045 X-RAY EXAM CHEST 1 VIEW: CPT | Mod: 26

## 2025-07-23 PROCEDURE — 36415 COLL VENOUS BLD VENIPUNCTURE: CPT

## 2025-07-23 PROCEDURE — 99285 EMERGENCY DEPT VISIT HI MDM: CPT

## 2025-07-23 PROCEDURE — 84295 ASSAY OF SERUM SODIUM: CPT

## 2025-07-23 PROCEDURE — 74177 CT ABD & PELVIS W/CONTRAST: CPT

## 2025-07-23 PROCEDURE — 82330 ASSAY OF CALCIUM: CPT

## 2025-07-23 PROCEDURE — 83605 ASSAY OF LACTIC ACID: CPT

## 2025-07-23 PROCEDURE — 86703 HIV-1/HIV-2 1 RESULT ANTBDY: CPT

## 2025-07-23 PROCEDURE — 85025 COMPLETE CBC W/AUTO DIFF WBC: CPT

## 2025-07-23 PROCEDURE — 84100 ASSAY OF PHOSPHORUS: CPT

## 2025-07-23 PROCEDURE — 84132 ASSAY OF SERUM POTASSIUM: CPT

## 2025-07-23 PROCEDURE — 83690 ASSAY OF LIPASE: CPT

## 2025-07-23 PROCEDURE — 80053 COMPREHEN METABOLIC PANEL: CPT

## 2025-07-23 PROCEDURE — 83735 ASSAY OF MAGNESIUM: CPT

## 2025-07-23 PROCEDURE — 82803 BLOOD GASES ANY COMBINATION: CPT

## 2025-07-23 PROCEDURE — 71045 X-RAY EXAM CHEST 1 VIEW: CPT

## 2025-07-23 PROCEDURE — 82947 ASSAY GLUCOSE BLOOD QUANT: CPT

## 2025-07-23 PROCEDURE — 74177 CT ABD & PELVIS W/CONTRAST: CPT | Mod: 26

## 2025-07-23 RX ORDER — ONDANSETRON HCL/PF 4 MG/2 ML
4 VIAL (ML) INJECTION ONCE
Refills: 0 | Status: COMPLETED | OUTPATIENT
Start: 2025-07-23 | End: 2025-07-23

## 2025-07-23 RX ORDER — ACETAMINOPHEN 500 MG/5ML
1000 LIQUID (ML) ORAL ONCE
Refills: 0 | Status: COMPLETED | OUTPATIENT
Start: 2025-07-23 | End: 2025-07-23

## 2025-07-23 RX ORDER — MAGNESIUM, ALUMINUM HYDROXIDE 200-200 MG
30 TABLET,CHEWABLE ORAL ONCE
Refills: 0 | Status: COMPLETED | OUTPATIENT
Start: 2025-07-23 | End: 2025-07-23

## 2025-07-23 RX ORDER — DIATRIZOATE MEGLUMINE, SODIUM 66 %-10 %
30 VIAL (ML) INJECTION ONCE
Refills: 0 | Status: COMPLETED | OUTPATIENT
Start: 2025-07-23 | End: 2025-07-23

## 2025-07-23 RX ORDER — HYDROMORPHONE/SOD CHLOR,ISO/PF 2 MG/10 ML
0.2 SYRINGE (ML) INJECTION ONCE
Refills: 0 | Status: DISCONTINUED | OUTPATIENT
Start: 2025-07-23 | End: 2025-07-23

## 2025-07-23 RX ORDER — OXYCODONE HYDROCHLORIDE 30 MG/1
2.5 TABLET ORAL ONCE
Refills: 0 | Status: DISCONTINUED | OUTPATIENT
Start: 2025-07-23 | End: 2025-07-23

## 2025-07-23 RX ORDER — SUCRALFATE 1 G
1 TABLET ORAL ONCE
Refills: 0 | Status: COMPLETED | OUTPATIENT
Start: 2025-07-23 | End: 2025-07-23

## 2025-07-23 RX ADMIN — Medication 2 MILLIGRAM(S): at 17:17

## 2025-07-23 RX ADMIN — Medication 30 MILLILITER(S): at 17:26

## 2025-07-23 RX ADMIN — Medication 0.2 MILLIGRAM(S): at 23:33

## 2025-07-23 RX ADMIN — Medication 500 MILLILITER(S): at 17:18

## 2025-07-23 RX ADMIN — Medication 30 MILLILITER(S): at 20:30

## 2025-07-23 RX ADMIN — Medication 20 MILLIGRAM(S): at 17:18

## 2025-07-23 RX ADMIN — Medication 400 MILLIGRAM(S): at 22:17

## 2025-07-23 RX ADMIN — Medication 4 MILLIGRAM(S): at 17:17

## 2025-07-23 RX ADMIN — Medication 1 GRAM(S): at 20:30

## 2025-07-23 RX ADMIN — Medication 20 MILLIGRAM(S): at 20:29

## 2025-07-23 RX ADMIN — Medication 4 MILLIGRAM(S): at 19:07

## 2025-07-23 NOTE — PATIENT PROFILE ADULT - PRIMARY SOURCE OF SUPPORT/COMFORT
Ok for discharge per PA  Patient was discharged to home.  Instructed Patient on all discharge instructions, follow ups, and medications.  Patient verbalized understanding and had no further questions.   Patient ambulatory with even steady gait  In no signs of distress at discharge.     
no one

## 2025-07-24 DIAGNOSIS — E78.5 HYPERLIPIDEMIA, UNSPECIFIED: ICD-10-CM

## 2025-07-24 DIAGNOSIS — Z29.9 ENCOUNTER FOR PROPHYLACTIC MEASURES, UNSPECIFIED: ICD-10-CM

## 2025-07-24 DIAGNOSIS — F20.9 SCHIZOPHRENIA, UNSPECIFIED: ICD-10-CM

## 2025-07-24 DIAGNOSIS — J44.89 OTHER SPECIFIED CHRONIC OBSTRUCTIVE PULMONARY DISEASE: ICD-10-CM

## 2025-07-24 DIAGNOSIS — I10 ESSENTIAL (PRIMARY) HYPERTENSION: ICD-10-CM

## 2025-07-24 DIAGNOSIS — F31.9 BIPOLAR DISORDER, UNSPECIFIED: ICD-10-CM

## 2025-07-24 DIAGNOSIS — N18.6 END STAGE RENAL DISEASE: ICD-10-CM

## 2025-07-24 LAB
A1C WITH ESTIMATED AVERAGE GLUCOSE RESULT: 4.3 % — SIGNIFICANT CHANGE UP (ref 4–5.6)
ANION GAP SERPL CALC-SCNC: 6 MMOL/L — SIGNIFICANT CHANGE UP (ref 5–17)
BUN SERPL-MCNC: 21 MG/DL — HIGH (ref 7–18)
CALCIUM SERPL-MCNC: 9.2 MG/DL — SIGNIFICANT CHANGE UP (ref 8.4–10.5)
CHLORIDE SERPL-SCNC: 96 MMOL/L — SIGNIFICANT CHANGE UP (ref 96–108)
CHOLEST SERPL-MCNC: 154 MG/DL — SIGNIFICANT CHANGE UP
CO2 SERPL-SCNC: 30 MMOL/L — SIGNIFICANT CHANGE UP (ref 22–31)
CREAT SERPL-MCNC: 5.01 MG/DL — HIGH (ref 0.5–1.3)
EGFR: 14 ML/MIN/1.73M2 — LOW
EGFR: 14 ML/MIN/1.73M2 — LOW
ESTIMATED AVERAGE GLUCOSE: 77 MG/DL — SIGNIFICANT CHANGE UP (ref 68–114)
GLUCOSE SERPL-MCNC: 103 MG/DL — HIGH (ref 70–99)
HCT VFR BLD CALC: 39.1 % — SIGNIFICANT CHANGE UP (ref 39–50)
HCV AB S/CO SERPL IA: 16.98 S/CO — HIGH (ref 0–0.79)
HCV AB SERPL-IMP: REACTIVE
HDLC SERPL-MCNC: 42 MG/DL — SIGNIFICANT CHANGE UP
HGB BLD-MCNC: 12.5 G/DL — LOW (ref 13–17)
LDLC SERPL-MCNC: 95 MG/DL — SIGNIFICANT CHANGE UP
LIPID PNL WITH DIRECT LDL SERPL: 95 MG/DL — SIGNIFICANT CHANGE UP
MAGNESIUM SERPL-MCNC: 2.2 MG/DL — SIGNIFICANT CHANGE UP (ref 1.6–2.6)
MCHC RBC-ENTMCNC: 31.2 PG — SIGNIFICANT CHANGE UP (ref 27–34)
MCHC RBC-ENTMCNC: 32 G/DL — SIGNIFICANT CHANGE UP (ref 32–36)
MCV RBC AUTO: 97.5 FL — SIGNIFICANT CHANGE UP (ref 80–100)
MRSA PCR RESULT.: SIGNIFICANT CHANGE UP
NONHDLC SERPL-MCNC: 112 MG/DL — SIGNIFICANT CHANGE UP
NRBC BLD AUTO-RTO: 0 /100 WBCS — SIGNIFICANT CHANGE UP (ref 0–0)
PHOSPHATE SERPL-MCNC: 5.8 MG/DL — HIGH (ref 2.5–4.5)
PLATELET # BLD AUTO: 137 K/UL — LOW (ref 150–400)
POTASSIUM SERPL-MCNC: 3.4 MMOL/L — LOW (ref 3.5–5.3)
POTASSIUM SERPL-SCNC: 3.4 MMOL/L — LOW (ref 3.5–5.3)
RBC # BLD: 4.01 M/UL — LOW (ref 4.2–5.8)
RBC # FLD: 14.6 % — HIGH (ref 10.3–14.5)
S AUREUS DNA NOSE QL NAA+PROBE: DETECTED
SODIUM SERPL-SCNC: 132 MMOL/L — LOW (ref 135–145)
TRIGL SERPL-MCNC: 88 MG/DL — SIGNIFICANT CHANGE UP
WBC # BLD: 4.38 K/UL — SIGNIFICANT CHANGE UP (ref 3.8–10.5)
WBC # FLD AUTO: 4.38 K/UL — SIGNIFICANT CHANGE UP (ref 3.8–10.5)

## 2025-07-24 PROCEDURE — 83690 ASSAY OF LIPASE: CPT

## 2025-07-24 PROCEDURE — 80061 LIPID PANEL: CPT

## 2025-07-24 PROCEDURE — 83036 HEMOGLOBIN GLYCOSYLATED A1C: CPT

## 2025-07-24 PROCEDURE — 36415 COLL VENOUS BLD VENIPUNCTURE: CPT

## 2025-07-24 PROCEDURE — 71045 X-RAY EXAM CHEST 1 VIEW: CPT

## 2025-07-24 PROCEDURE — 87340 HEPATITIS B SURFACE AG IA: CPT

## 2025-07-24 PROCEDURE — 86803 HEPATITIS C AB TEST: CPT

## 2025-07-24 PROCEDURE — 85027 COMPLETE CBC AUTOMATED: CPT

## 2025-07-24 PROCEDURE — 99232 SBSQ HOSP IP/OBS MODERATE 35: CPT

## 2025-07-24 PROCEDURE — 80053 COMPREHEN METABOLIC PANEL: CPT

## 2025-07-24 PROCEDURE — 80048 BASIC METABOLIC PNL TOTAL CA: CPT

## 2025-07-24 PROCEDURE — 82803 BLOOD GASES ANY COMBINATION: CPT

## 2025-07-24 PROCEDURE — 94640 AIRWAY INHALATION TREATMENT: CPT

## 2025-07-24 PROCEDURE — 83605 ASSAY OF LACTIC ACID: CPT

## 2025-07-24 PROCEDURE — 82947 ASSAY GLUCOSE BLOOD QUANT: CPT

## 2025-07-24 PROCEDURE — 82330 ASSAY OF CALCIUM: CPT

## 2025-07-24 PROCEDURE — 87521 HEPATITIS C PROBE&RVRS TRNSC: CPT

## 2025-07-24 PROCEDURE — 86706 HEP B SURFACE ANTIBODY: CPT

## 2025-07-24 PROCEDURE — 84132 ASSAY OF SERUM POTASSIUM: CPT

## 2025-07-24 PROCEDURE — 87641 MR-STAPH DNA AMP PROBE: CPT

## 2025-07-24 PROCEDURE — 87640 STAPH A DNA AMP PROBE: CPT

## 2025-07-24 PROCEDURE — 99261: CPT

## 2025-07-24 PROCEDURE — 74177 CT ABD & PELVIS W/CONTRAST: CPT

## 2025-07-24 PROCEDURE — 84295 ASSAY OF SERUM SODIUM: CPT

## 2025-07-24 PROCEDURE — 99222 1ST HOSP IP/OBS MODERATE 55: CPT

## 2025-07-24 PROCEDURE — 85025 COMPLETE CBC W/AUTO DIFF WBC: CPT

## 2025-07-24 PROCEDURE — 86703 HIV-1/HIV-2 1 RESULT ANTBDY: CPT

## 2025-07-24 PROCEDURE — 83735 ASSAY OF MAGNESIUM: CPT

## 2025-07-24 PROCEDURE — 84100 ASSAY OF PHOSPHORUS: CPT

## 2025-07-24 RX ORDER — ALBUTEROL SULFATE 2.5 MG/3ML
2 VIAL, NEBULIZER (ML) INHALATION EVERY 6 HOURS
Refills: 0 | Status: DISCONTINUED | OUTPATIENT
Start: 2025-07-24 | End: 2025-07-26

## 2025-07-24 RX ORDER — OMEPRAZOLE 20 MG/1
1 CAPSULE, DELAYED RELEASE ORAL
Refills: 0 | DISCHARGE

## 2025-07-24 RX ORDER — POLYETHYLENE GLYCOL 3350 17 G/17G
17 POWDER, FOR SOLUTION ORAL DAILY
Refills: 0 | Status: DISCONTINUED | OUTPATIENT
Start: 2025-07-24 | End: 2025-07-26

## 2025-07-24 RX ORDER — HYDROMORPHONE/SOD CHLOR,ISO/PF 2 MG/10 ML
2 SYRINGE (ML) INJECTION ONCE
Refills: 0 | Status: DISCONTINUED | OUTPATIENT
Start: 2025-07-24 | End: 2025-07-24

## 2025-07-24 RX ORDER — HEPARIN SODIUM 1000 [USP'U]/ML
5000 INJECTION INTRAVENOUS; SUBCUTANEOUS EVERY 12 HOURS
Refills: 0 | Status: DISCONTINUED | OUTPATIENT
Start: 2025-07-24 | End: 2025-07-24

## 2025-07-24 RX ORDER — FLUPHENAZINE HCL 10 MG
5 TABLET ORAL DAILY
Refills: 0 | Status: DISCONTINUED | OUTPATIENT
Start: 2025-07-24 | End: 2025-07-24

## 2025-07-24 RX ORDER — MONTELUKAST SODIUM 10 MG/1
10 TABLET ORAL DAILY
Refills: 0 | Status: DISCONTINUED | OUTPATIENT
Start: 2025-07-24 | End: 2025-07-26

## 2025-07-24 RX ORDER — SEVELAMER HYDROCHLORIDE 800 MG/1
800 TABLET ORAL
Refills: 0 | Status: DISCONTINUED | OUTPATIENT
Start: 2025-07-24 | End: 2025-07-26

## 2025-07-24 RX ORDER — BISACODYL 5 MG
5 TABLET, DELAYED RELEASE (ENTERIC COATED) ORAL EVERY 12 HOURS
Refills: 0 | Status: DISCONTINUED | OUTPATIENT
Start: 2025-07-24 | End: 2025-07-26

## 2025-07-24 RX ORDER — QUETIAPINE FUMARATE 25 MG/1
400 TABLET ORAL AT BEDTIME
Refills: 0 | Status: DISCONTINUED | OUTPATIENT
Start: 2025-07-24 | End: 2025-07-26

## 2025-07-24 RX ORDER — MAGNESIUM, ALUMINUM HYDROXIDE 200-200 MG
30 TABLET,CHEWABLE ORAL EVERY 4 HOURS
Refills: 0 | Status: DISCONTINUED | OUTPATIENT
Start: 2025-07-24 | End: 2025-07-26

## 2025-07-24 RX ORDER — ATORVASTATIN CALCIUM 80 MG/1
20 TABLET, FILM COATED ORAL AT BEDTIME
Refills: 0 | Status: DISCONTINUED | OUTPATIENT
Start: 2025-07-24 | End: 2025-07-26

## 2025-07-24 RX ORDER — ESCITALOPRAM OXALATE 20 MG/1
10 TABLET ORAL DAILY
Refills: 0 | Status: DISCONTINUED | OUTPATIENT
Start: 2025-07-24 | End: 2025-07-24

## 2025-07-24 RX ORDER — HYDROMORPHONE/SOD CHLOR,ISO/PF 2 MG/10 ML
1 SYRINGE (ML) INJECTION ONCE
Refills: 0 | Status: DISCONTINUED | OUTPATIENT
Start: 2025-07-24 | End: 2025-07-24

## 2025-07-24 RX ORDER — HEPARIN SODIUM 1000 [USP'U]/ML
5000 INJECTION INTRAVENOUS; SUBCUTANEOUS EVERY 8 HOURS
Refills: 0 | Status: DISCONTINUED | OUTPATIENT
Start: 2025-07-24 | End: 2025-07-26

## 2025-07-24 RX ORDER — HYDROMORPHONE/SOD CHLOR,ISO/PF 2 MG/10 ML
0.5 SYRINGE (ML) INJECTION EVERY 4 HOURS
Refills: 0 | Status: DISCONTINUED | OUTPATIENT
Start: 2025-07-24 | End: 2025-07-26

## 2025-07-24 RX ORDER — ATORVASTATIN CALCIUM 80 MG/1
1 TABLET, FILM COATED ORAL
Refills: 0 | DISCHARGE

## 2025-07-24 RX ORDER — AMLODIPINE BESYLATE 10 MG/1
5 TABLET ORAL DAILY
Refills: 0 | Status: DISCONTINUED | OUTPATIENT
Start: 2025-07-24 | End: 2025-07-26

## 2025-07-24 RX ORDER — HYDROMORPHONE/SOD CHLOR,ISO/PF 2 MG/10 ML
0.2 SYRINGE (ML) INJECTION ONCE
Refills: 0 | Status: DISCONTINUED | OUTPATIENT
Start: 2025-07-24 | End: 2025-07-24

## 2025-07-24 RX ORDER — SODIUM CHLORIDE 9 G/1000ML
250 INJECTION, SOLUTION INTRAVENOUS ONCE
Refills: 0 | Status: COMPLETED | OUTPATIENT
Start: 2025-07-24 | End: 2025-07-24

## 2025-07-24 RX ORDER — SENNA 187 MG
2 TABLET ORAL AT BEDTIME
Refills: 0 | Status: DISCONTINUED | OUTPATIENT
Start: 2025-07-24 | End: 2025-07-26

## 2025-07-24 RX ORDER — ACETAMINOPHEN 500 MG/5ML
650 LIQUID (ML) ORAL EVERY 6 HOURS
Refills: 0 | Status: DISCONTINUED | OUTPATIENT
Start: 2025-07-24 | End: 2025-07-24

## 2025-07-24 RX ORDER — CARVEDILOL 3.12 MG/1
25 TABLET, FILM COATED ORAL EVERY 12 HOURS
Refills: 0 | Status: DISCONTINUED | OUTPATIENT
Start: 2025-07-24 | End: 2025-07-26

## 2025-07-24 RX ADMIN — CARVEDILOL 25 MILLIGRAM(S): 3.12 TABLET, FILM COATED ORAL at 17:18

## 2025-07-24 RX ADMIN — HEPARIN SODIUM 5000 UNIT(S): 1000 INJECTION INTRAVENOUS; SUBCUTANEOUS at 21:20

## 2025-07-24 RX ADMIN — Medication 1 MILLIGRAM(S): at 08:50

## 2025-07-24 RX ADMIN — POLYETHYLENE GLYCOL 3350 17 GRAM(S): 17 POWDER, FOR SOLUTION ORAL at 17:17

## 2025-07-24 RX ADMIN — Medication 2 TABLET(S): at 21:21

## 2025-07-24 RX ADMIN — Medication 40 MILLIGRAM(S): at 06:03

## 2025-07-24 RX ADMIN — QUETIAPINE FUMARATE 400 MILLIGRAM(S): 25 TABLET ORAL at 21:21

## 2025-07-24 RX ADMIN — HEPARIN SODIUM 5000 UNIT(S): 1000 INJECTION INTRAVENOUS; SUBCUTANEOUS at 17:18

## 2025-07-24 RX ADMIN — Medication 0.5 MILLIGRAM(S): at 15:08

## 2025-07-24 RX ADMIN — Medication 1 MILLIGRAM(S): at 09:15

## 2025-07-24 RX ADMIN — Medication 0.5 MILLIGRAM(S): at 15:30

## 2025-07-24 RX ADMIN — Medication 0.2 MILLIGRAM(S): at 05:26

## 2025-07-24 RX ADMIN — HEPARIN SODIUM 5000 UNIT(S): 1000 INJECTION INTRAVENOUS; SUBCUTANEOUS at 06:08

## 2025-07-24 RX ADMIN — CARVEDILOL 25 MILLIGRAM(S): 3.12 TABLET, FILM COATED ORAL at 06:03

## 2025-07-24 RX ADMIN — Medication 4 MILLIGRAM(S): at 01:25

## 2025-07-24 RX ADMIN — SEVELAMER HYDROCHLORIDE 800 MILLIGRAM(S): 800 TABLET ORAL at 08:07

## 2025-07-24 RX ADMIN — Medication 1 DOSE(S): at 21:23

## 2025-07-24 RX ADMIN — ATORVASTATIN CALCIUM 20 MILLIGRAM(S): 80 TABLET, FILM COATED ORAL at 21:20

## 2025-07-24 RX ADMIN — AMLODIPINE BESYLATE 5 MILLIGRAM(S): 10 TABLET ORAL at 06:03

## 2025-07-24 RX ADMIN — Medication 0.5 MILLIGRAM(S): at 20:43

## 2025-07-24 RX ADMIN — Medication 0.5 MILLIGRAM(S): at 19:49

## 2025-07-24 RX ADMIN — Medication 1 DOSE(S): at 12:51

## 2025-07-24 RX ADMIN — Medication 0.2 MILLIGRAM(S): at 00:30

## 2025-07-24 RX ADMIN — SEVELAMER HYDROCHLORIDE 800 MILLIGRAM(S): 800 TABLET ORAL at 12:50

## 2025-07-24 RX ADMIN — SEVELAMER HYDROCHLORIDE 800 MILLIGRAM(S): 800 TABLET ORAL at 17:18

## 2025-07-24 RX ADMIN — SODIUM CHLORIDE 500 MILLILITER(S): 9 INJECTION, SOLUTION INTRAVENOUS at 06:05

## 2025-07-24 RX ADMIN — Medication 0.2 MILLIGRAM(S): at 04:34

## 2025-07-25 LAB
ANION GAP SERPL CALC-SCNC: 7 MMOL/L — SIGNIFICANT CHANGE UP (ref 5–17)
BUN SERPL-MCNC: 21 MG/DL — HIGH (ref 7–18)
CALCIUM SERPL-MCNC: 9.6 MG/DL — SIGNIFICANT CHANGE UP (ref 8.4–10.5)
CHLORIDE SERPL-SCNC: 93 MMOL/L — LOW (ref 96–108)
CO2 SERPL-SCNC: 32 MMOL/L — HIGH (ref 22–31)
CREAT SERPL-MCNC: 6.06 MG/DL — HIGH (ref 0.5–1.3)
EGFR: 11 ML/MIN/1.73M2 — LOW
EGFR: 11 ML/MIN/1.73M2 — LOW
GLUCOSE SERPL-MCNC: 101 MG/DL — HIGH (ref 70–99)
HBV SURFACE AB SER-ACNC: REACTIVE — SIGNIFICANT CHANGE UP
HBV SURFACE AG SER-ACNC: SIGNIFICANT CHANGE UP
HCT VFR BLD CALC: 37.9 % — LOW (ref 39–50)
HCV RNA FLD QL NAA+PROBE: SIGNIFICANT CHANGE UP
HCV RNA SPEC QL PROBE+SIG AMP: SIGNIFICANT CHANGE UP
HGB BLD-MCNC: 12 G/DL — LOW (ref 13–17)
MCHC RBC-ENTMCNC: 31.2 PG — SIGNIFICANT CHANGE UP (ref 27–34)
MCHC RBC-ENTMCNC: 31.7 G/DL — LOW (ref 32–36)
MCV RBC AUTO: 98.4 FL — SIGNIFICANT CHANGE UP (ref 80–100)
NRBC BLD AUTO-RTO: 0 /100 WBCS — SIGNIFICANT CHANGE UP (ref 0–0)
PLATELET # BLD AUTO: 148 K/UL — LOW (ref 150–400)
POTASSIUM SERPL-MCNC: 3.9 MMOL/L — SIGNIFICANT CHANGE UP (ref 3.5–5.3)
POTASSIUM SERPL-SCNC: 3.9 MMOL/L — SIGNIFICANT CHANGE UP (ref 3.5–5.3)
RBC # BLD: 3.85 M/UL — LOW (ref 4.2–5.8)
RBC # FLD: 14.5 % — SIGNIFICANT CHANGE UP (ref 10.3–14.5)
SODIUM SERPL-SCNC: 132 MMOL/L — LOW (ref 135–145)
WBC # BLD: 4.87 K/UL — SIGNIFICANT CHANGE UP (ref 3.8–10.5)
WBC # FLD AUTO: 4.87 K/UL — SIGNIFICANT CHANGE UP (ref 3.8–10.5)

## 2025-07-25 PROCEDURE — 99232 SBSQ HOSP IP/OBS MODERATE 35: CPT

## 2025-07-25 PROCEDURE — 99223 1ST HOSP IP/OBS HIGH 75: CPT

## 2025-07-25 RX ORDER — ONDANSETRON HCL/PF 4 MG/2 ML
4 VIAL (ML) INJECTION EVERY 6 HOURS
Refills: 0 | Status: DISCONTINUED | OUTPATIENT
Start: 2025-07-25 | End: 2025-07-26

## 2025-07-25 RX ORDER — MUPIROCIN CALCIUM 20 MG/G
1 CREAM TOPICAL
Refills: 0 | Status: DISCONTINUED | OUTPATIENT
Start: 2025-07-25 | End: 2025-07-26

## 2025-07-25 RX ADMIN — Medication 0.5 MILLIGRAM(S): at 20:02

## 2025-07-25 RX ADMIN — SEVELAMER HYDROCHLORIDE 800 MILLIGRAM(S): 800 TABLET ORAL at 08:35

## 2025-07-25 RX ADMIN — CARVEDILOL 25 MILLIGRAM(S): 3.12 TABLET, FILM COATED ORAL at 18:05

## 2025-07-25 RX ADMIN — SEVELAMER HYDROCHLORIDE 800 MILLIGRAM(S): 800 TABLET ORAL at 18:05

## 2025-07-25 RX ADMIN — HEPARIN SODIUM 5000 UNIT(S): 1000 INJECTION INTRAVENOUS; SUBCUTANEOUS at 06:10

## 2025-07-25 RX ADMIN — HEPARIN SODIUM 5000 UNIT(S): 1000 INJECTION INTRAVENOUS; SUBCUTANEOUS at 13:42

## 2025-07-25 RX ADMIN — HEPARIN SODIUM 5000 UNIT(S): 1000 INJECTION INTRAVENOUS; SUBCUTANEOUS at 22:11

## 2025-07-25 RX ADMIN — CARVEDILOL 25 MILLIGRAM(S): 3.12 TABLET, FILM COATED ORAL at 06:08

## 2025-07-25 RX ADMIN — Medication 0.5 MILLIGRAM(S): at 14:06

## 2025-07-25 RX ADMIN — AMLODIPINE BESYLATE 5 MILLIGRAM(S): 10 TABLET ORAL at 06:07

## 2025-07-25 RX ADMIN — Medication 40 MILLIGRAM(S): at 06:08

## 2025-07-25 RX ADMIN — Medication 1 APPLICATION(S): at 06:09

## 2025-07-25 RX ADMIN — Medication 1 DOSE(S): at 09:48

## 2025-07-25 RX ADMIN — Medication 0.5 MILLIGRAM(S): at 21:02

## 2025-07-25 RX ADMIN — Medication 0.5 MILLIGRAM(S): at 13:41

## 2025-07-25 RX ADMIN — MUPIROCIN CALCIUM 1 APPLICATION(S): 20 CREAM TOPICAL at 18:04

## 2025-07-25 RX ADMIN — ATORVASTATIN CALCIUM 20 MILLIGRAM(S): 80 TABLET, FILM COATED ORAL at 22:11

## 2025-07-25 RX ADMIN — SEVELAMER HYDROCHLORIDE 800 MILLIGRAM(S): 800 TABLET ORAL at 12:31

## 2025-07-25 RX ADMIN — MONTELUKAST SODIUM 10 MILLIGRAM(S): 10 TABLET ORAL at 12:32

## 2025-07-25 RX ADMIN — QUETIAPINE FUMARATE 400 MILLIGRAM(S): 25 TABLET ORAL at 22:10

## 2025-07-25 RX ADMIN — Medication 40 MILLIEQUIVALENT(S): at 12:30

## 2025-07-26 ENCOUNTER — TRANSCRIPTION ENCOUNTER (OUTPATIENT)
Age: 45
End: 2025-07-26

## 2025-07-26 VITALS — DIASTOLIC BLOOD PRESSURE: 61 MMHG | HEART RATE: 58 BPM | SYSTOLIC BLOOD PRESSURE: 122 MMHG

## 2025-07-26 LAB
ANION GAP SERPL CALC-SCNC: 8 MMOL/L — SIGNIFICANT CHANGE UP (ref 5–17)
BUN SERPL-MCNC: 30 MG/DL — HIGH (ref 7–18)
CALCIUM SERPL-MCNC: 9.2 MG/DL — SIGNIFICANT CHANGE UP (ref 8.4–10.5)
CHLORIDE SERPL-SCNC: 92 MMOL/L — LOW (ref 96–108)
CO2 SERPL-SCNC: 30 MMOL/L — SIGNIFICANT CHANGE UP (ref 22–31)
CREAT SERPL-MCNC: 7.56 MG/DL — HIGH (ref 0.5–1.3)
EGFR: 8 ML/MIN/1.73M2 — LOW
EGFR: 8 ML/MIN/1.73M2 — LOW
GLUCOSE SERPL-MCNC: 114 MG/DL — HIGH (ref 70–99)
POTASSIUM SERPL-MCNC: 4.3 MMOL/L — SIGNIFICANT CHANGE UP (ref 3.5–5.3)
POTASSIUM SERPL-SCNC: 4.3 MMOL/L — SIGNIFICANT CHANGE UP (ref 3.5–5.3)
SODIUM SERPL-SCNC: 130 MMOL/L — LOW (ref 135–145)

## 2025-07-26 PROCEDURE — 99285 EMERGENCY DEPT VISIT HI MDM: CPT

## 2025-07-26 PROCEDURE — 82330 ASSAY OF CALCIUM: CPT

## 2025-07-26 PROCEDURE — 80053 COMPREHEN METABOLIC PANEL: CPT

## 2025-07-26 PROCEDURE — 84132 ASSAY OF SERUM POTASSIUM: CPT

## 2025-07-26 PROCEDURE — 87641 MR-STAPH DNA AMP PROBE: CPT

## 2025-07-26 PROCEDURE — 83735 ASSAY OF MAGNESIUM: CPT

## 2025-07-26 PROCEDURE — 85025 COMPLETE CBC W/AUTO DIFF WBC: CPT

## 2025-07-26 PROCEDURE — 71045 X-RAY EXAM CHEST 1 VIEW: CPT

## 2025-07-26 PROCEDURE — 84295 ASSAY OF SERUM SODIUM: CPT

## 2025-07-26 PROCEDURE — 87340 HEPATITIS B SURFACE AG IA: CPT

## 2025-07-26 PROCEDURE — 96375 TX/PRO/DX INJ NEW DRUG ADDON: CPT

## 2025-07-26 PROCEDURE — 82803 BLOOD GASES ANY COMBINATION: CPT

## 2025-07-26 PROCEDURE — 83036 HEMOGLOBIN GLYCOSYLATED A1C: CPT

## 2025-07-26 PROCEDURE — 94640 AIRWAY INHALATION TREATMENT: CPT

## 2025-07-26 PROCEDURE — 86803 HEPATITIS C AB TEST: CPT

## 2025-07-26 PROCEDURE — 80061 LIPID PANEL: CPT

## 2025-07-26 PROCEDURE — 83690 ASSAY OF LIPASE: CPT

## 2025-07-26 PROCEDURE — 36415 COLL VENOUS BLD VENIPUNCTURE: CPT

## 2025-07-26 PROCEDURE — 83605 ASSAY OF LACTIC ACID: CPT

## 2025-07-26 PROCEDURE — 82947 ASSAY GLUCOSE BLOOD QUANT: CPT

## 2025-07-26 PROCEDURE — 80048 BASIC METABOLIC PNL TOTAL CA: CPT

## 2025-07-26 PROCEDURE — 84100 ASSAY OF PHOSPHORUS: CPT

## 2025-07-26 PROCEDURE — 86706 HEP B SURFACE ANTIBODY: CPT

## 2025-07-26 PROCEDURE — 87521 HEPATITIS C PROBE&RVRS TRNSC: CPT

## 2025-07-26 PROCEDURE — 87640 STAPH A DNA AMP PROBE: CPT

## 2025-07-26 PROCEDURE — 99239 HOSP IP/OBS DSCHRG MGMT >30: CPT

## 2025-07-26 PROCEDURE — 96376 TX/PRO/DX INJ SAME DRUG ADON: CPT

## 2025-07-26 PROCEDURE — 85027 COMPLETE CBC AUTOMATED: CPT

## 2025-07-26 PROCEDURE — 74177 CT ABD & PELVIS W/CONTRAST: CPT

## 2025-07-26 PROCEDURE — 93005 ELECTROCARDIOGRAM TRACING: CPT

## 2025-07-26 PROCEDURE — 86703 HIV-1/HIV-2 1 RESULT ANTBDY: CPT

## 2025-07-26 PROCEDURE — 96374 THER/PROPH/DIAG INJ IV PUSH: CPT

## 2025-07-26 PROCEDURE — 99261: CPT

## 2025-07-26 RX ORDER — ONDANSETRON HCL/PF 4 MG/2 ML
1 VIAL (ML) INJECTION
Qty: 28 | Refills: 0
Start: 2025-07-26 | End: 2025-08-01

## 2025-07-26 RX ORDER — POLYETHYLENE GLYCOL 3350 17 G/17G
17 POWDER, FOR SOLUTION ORAL
Qty: 510 | Refills: 0
Start: 2025-07-26 | End: 2025-08-24

## 2025-07-26 RX ORDER — ONDANSETRON HCL/PF 4 MG/2 ML
4 VIAL (ML) INJECTION ONCE
Refills: 0 | Status: COMPLETED | OUTPATIENT
Start: 2025-07-26 | End: 2025-07-26

## 2025-07-26 RX ORDER — MUPIROCIN CALCIUM 20 MG/G
1 CREAM TOPICAL
Qty: 1 | Refills: 0
Start: 2025-07-26 | End: 2025-07-29

## 2025-07-26 RX ADMIN — Medication 4 MILLIGRAM(S): at 14:57

## 2025-07-26 RX ADMIN — Medication 0.5 MILLIGRAM(S): at 08:27

## 2025-07-26 RX ADMIN — Medication 1 DOSE(S): at 06:19

## 2025-07-26 RX ADMIN — MUPIROCIN CALCIUM 1 APPLICATION(S): 20 CREAM TOPICAL at 17:23

## 2025-07-26 RX ADMIN — Medication 1 DOSE(S): at 12:41

## 2025-07-26 RX ADMIN — HEPARIN SODIUM 5000 UNIT(S): 1000 INJECTION INTRAVENOUS; SUBCUTANEOUS at 13:49

## 2025-07-26 RX ADMIN — MONTELUKAST SODIUM 10 MILLIGRAM(S): 10 TABLET ORAL at 12:41

## 2025-07-26 RX ADMIN — SEVELAMER HYDROCHLORIDE 800 MILLIGRAM(S): 800 TABLET ORAL at 12:41

## 2025-07-26 RX ADMIN — MUPIROCIN CALCIUM 1 APPLICATION(S): 20 CREAM TOPICAL at 06:19

## 2025-07-26 RX ADMIN — AMLODIPINE BESYLATE 5 MILLIGRAM(S): 10 TABLET ORAL at 06:18

## 2025-07-26 RX ADMIN — Medication 1 APPLICATION(S): at 06:22

## 2025-07-26 RX ADMIN — Medication 0.5 MILLIGRAM(S): at 17:23

## 2025-07-26 RX ADMIN — SEVELAMER HYDROCHLORIDE 800 MILLIGRAM(S): 800 TABLET ORAL at 08:26

## 2025-07-26 RX ADMIN — CARVEDILOL 25 MILLIGRAM(S): 3.12 TABLET, FILM COATED ORAL at 06:24

## 2025-07-26 RX ADMIN — Medication 0.5 MILLIGRAM(S): at 17:53

## 2025-07-26 RX ADMIN — Medication 0.5 MILLIGRAM(S): at 12:41

## 2025-07-26 RX ADMIN — Medication 0.5 MILLIGRAM(S): at 13:11

## 2025-07-26 RX ADMIN — SEVELAMER HYDROCHLORIDE 800 MILLIGRAM(S): 800 TABLET ORAL at 17:23

## 2025-07-26 RX ADMIN — CARVEDILOL 25 MILLIGRAM(S): 3.12 TABLET, FILM COATED ORAL at 17:23

## 2025-07-26 RX ADMIN — HEPARIN SODIUM 5000 UNIT(S): 1000 INJECTION INTRAVENOUS; SUBCUTANEOUS at 06:18

## 2025-07-26 RX ADMIN — Medication 0.5 MILLIGRAM(S): at 08:57

## 2025-07-26 RX ADMIN — Medication 40 MILLIGRAM(S): at 06:18

## 2025-08-12 ENCOUNTER — APPOINTMENT (OUTPATIENT)
Dept: SURGERY | Facility: CLINIC | Age: 45
End: 2025-08-12
Payer: MEDICARE

## 2025-08-12 ENCOUNTER — APPOINTMENT (OUTPATIENT)
Dept: BARIATRICS | Facility: CLINIC | Age: 45
End: 2025-08-12

## 2025-08-12 VITALS
BODY MASS INDEX: 36.45 KG/M2 | WEIGHT: 315 LBS | SYSTOLIC BLOOD PRESSURE: 97 MMHG | HEIGHT: 78 IN | DIASTOLIC BLOOD PRESSURE: 60 MMHG | HEART RATE: 67 BPM

## 2025-08-12 DIAGNOSIS — I10 ESSENTIAL (PRIMARY) HYPERTENSION: ICD-10-CM

## 2025-08-12 DIAGNOSIS — E78.5 HYPERLIPIDEMIA, UNSPECIFIED: ICD-10-CM

## 2025-08-12 DIAGNOSIS — Z98.84 BARIATRIC SURGERY STATUS: ICD-10-CM

## 2025-08-12 DIAGNOSIS — E66.01 MORBID (SEVERE) OBESITY DUE TO EXCESS CALORIES: ICD-10-CM

## 2025-08-12 PROCEDURE — 99213 OFFICE O/P EST LOW 20 MIN: CPT

## 2025-08-14 LAB
25(OH)D3 SERPL-MCNC: 37.6 NG/ML
ALBUMIN SERPL ELPH-MCNC: 4.2 G/DL
ALP BLD-CCNC: 142 U/L
ALT SERPL-CCNC: 16 U/L
ANION GAP SERPL CALC-SCNC: 19 MMOL/L
APTT BLD: 35.8 SEC
AST SERPL-CCNC: 18 U/L
BASOPHILS # BLD AUTO: 0.02 K/UL
BASOPHILS NFR BLD AUTO: 0.4 %
BILIRUB SERPL-MCNC: 0.5 MG/DL
BUN SERPL-MCNC: 41 MG/DL
CALCIUM SERPL-MCNC: 9.6 MG/DL
CALCIUM SERPL-MCNC: 9.6 MG/DL
CHLORIDE SERPL-SCNC: 94 MMOL/L
CHOLEST SERPL-MCNC: 140 MG/DL
CO2 SERPL-SCNC: 25 MMOL/L
CREAT SERPL-MCNC: 6.33 MG/DL
EGFRCR SERPLBLD CKD-EPI 2021: 10 ML/MIN/1.73M2
EOSINOPHIL # BLD AUTO: 0.19 K/UL
EOSINOPHIL NFR BLD AUTO: 3.4 %
ESTIMATED AVERAGE GLUCOSE: 91 MG/DL
FERRITIN SERPL-MCNC: 1745 NG/ML
FOLATE SERPL-MCNC: 10.6 NG/ML
GLUCOSE SERPL-MCNC: 120 MG/DL
HBA1C MFR BLD HPLC: 4.8 %
HCT VFR BLD CALC: 37.2 %
HDLC SERPL-MCNC: 50 MG/DL
HGB BLD-MCNC: 11.7 G/DL
IMM GRANULOCYTES NFR BLD AUTO: 0.2 %
INR PPP: 0.9 RATIO
IRON SATN MFR SERPL: 72 %
IRON SERPL-MCNC: 191 UG/DL
LDLC SERPL-MCNC: 76 MG/DL
LYMPHOCYTES # BLD AUTO: 1 K/UL
LYMPHOCYTES NFR BLD AUTO: 18.1 %
MAN DIFF?: NORMAL
MCHC RBC-ENTMCNC: 30.5 PG
MCHC RBC-ENTMCNC: 31.5 G/DL
MCV RBC AUTO: 97.1 FL
MONOCYTES # BLD AUTO: 0.63 K/UL
MONOCYTES NFR BLD AUTO: 11.4 %
NEUTROPHILS # BLD AUTO: 3.67 K/UL
NEUTROPHILS NFR BLD AUTO: 66.5 %
NONHDLC SERPL-MCNC: 90 MG/DL
PARATHYROID HORMONE INTACT: 522 PG/ML
PLATELET # BLD AUTO: 166 K/UL
POTASSIUM SERPL-SCNC: 4.1 MMOL/L
PROT SERPL-MCNC: 7.7 G/DL
PT BLD: 10.7 SEC
RBC # BLD: 3.83 M/UL
RBC # FLD: 13.7 %
SODIUM SERPL-SCNC: 138 MMOL/L
TIBC SERPL-MCNC: 266 UG/DL
TRIGL SERPL-MCNC: 71 MG/DL
TSH SERPL-ACNC: 1.17 UIU/ML
UIBC SERPL-MCNC: 75 UG/DL
VIT B12 SERPL-MCNC: 1247 PG/ML
WBC # FLD AUTO: 5.52 K/UL

## 2025-08-14 RX ORDER — MULTIVITAMIN
TABLET ORAL
Qty: 90 | Refills: 2 | Status: ACTIVE | COMMUNITY
Start: 2025-08-14 | End: 1900-01-01

## 2025-08-14 RX ORDER — CHOLECALCIFEROL (VITAMIN D3) 50 MCG
25 MCG TABLET ORAL
Qty: 90 | Refills: 1 | Status: ACTIVE | COMMUNITY
Start: 2025-08-14 | End: 1900-01-01

## 2025-08-18 LAB — VIT B1 SERPL-MCNC: 133.9 NMOL/L

## (undated) DEVICE — WARMING BLANKET FULL ADULT

## (undated) DEVICE — ELCTR REM POLYHESIVE ADULT PT RETURN 15FT

## (undated) DEVICE — Device

## (undated) DEVICE — DRSG STERISTRIPS 0.5 X 4"

## (undated) DEVICE — GLV 8.5 PROTEXIS (WHITE)

## (undated) DEVICE — CLAMP BULLDOG MINI STRAIGHT (GREEN) DISP

## (undated) DEVICE — SUCTION TUBE CARDIAC SOFT TIP 6FR SHAFT 10FR TIP 6"

## (undated) DEVICE — BITE BLOCK ADULT 20 X 27MM (GREEN)

## (undated) DEVICE — VISITEC 4X4

## (undated) DEVICE — PACK IV START WITH CHG

## (undated) DEVICE — SOL INJ NS 0.9% 500ML 1-PORT

## (undated) DEVICE — GLV 8 PROTEXIS (WHITE)

## (undated) DEVICE — VESSEL LOOP EXTRA MAXI-BLUE 0.200" X 22"

## (undated) DEVICE — MASK LRG MED AND HIGH O2 CONC M TO M 10FT

## (undated) DEVICE — SUT TICRON 5 30" KV-40

## (undated) DEVICE — WARMING BLANKET DUO-THERM HYPER/HYPOTHERM ADULT

## (undated) DEVICE — LIGASURE MARYLAND 5MM X 44CM

## (undated) DEVICE — DRAIN CHANNEL 19FR ROUND FULL FLUTED

## (undated) DEVICE — WARMING BLANKET LOWER ADULT

## (undated) DEVICE — PACK UNIVERSAL CARDIAC

## (undated) DEVICE — SALIVA EJECTOR (BLUE)

## (undated) DEVICE — BIOPSY FORCEP RADIAL JAW 4 STANDARD WITH NEEDLE

## (undated) DEVICE — SUT POLYSORB 2 30" GS-26

## (undated) DEVICE — SUT PROLENE 5-0 30" RB-2

## (undated) DEVICE — TROCAR COVIDIEN VERSAPORT BLADELESS OPTICAL 5MM STANDARD

## (undated) DEVICE — TUBING MEDI-VAC W MAXIGRIP CONNECTORS 1/4"X6'

## (undated) DEVICE — SOL IRR POUR H2O 250ML

## (undated) DEVICE — DRAPE 1/2 SHEET 40X57"

## (undated) DEVICE — D HELP - CLEARVIEW CLEARIFY SYSTEM

## (undated) DEVICE — FOR-ESU VALLEYLAB T7E14999DX: Type: DURABLE MEDICAL EQUIPMENT

## (undated) DEVICE — GLV 7 PROTEXIS (WHITE)

## (undated) DEVICE — SYR LUER LOK 20CC

## (undated) DEVICE — STAPLER SKIN VISI-STAT 35 WIDE

## (undated) DEVICE — BULLDOG SPRING CLIP 6MM SOFT/SOFT

## (undated) DEVICE — NDL TAPR FR EYE 1/2 CIR 3

## (undated) DEVICE — SOL IRR POUR NS 0.9% 500ML

## (undated) DEVICE — SUT SILK 3-0 18" TIES

## (undated) DEVICE — SUMP PERICARDIAL 20FR 1/4" ADULT

## (undated) DEVICE — STEALTH CLAMP INSERT FIBRA/FIBRA 60MM

## (undated) DEVICE — DRAPE MAYO STAND 30"

## (undated) DEVICE — DRSG TAPE UMBILICAL COTTON 2" X 30 X 1/8"

## (undated) DEVICE — SUT SOFSILK 2-0 18" TIES

## (undated) DEVICE — GOWN TRIMAX LG

## (undated) DEVICE — SUT MONOCRYL 4-0 27" PS-2 UNDYED

## (undated) DEVICE — SUT SOFSILK 2 60" TIES

## (undated) DEVICE — TUBING ATS SUCTION LINE

## (undated) DEVICE — SOL INJ NS 0.9% 100ML

## (undated) DEVICE — ELCTR HEX BLADE

## (undated) DEVICE — DRAPE INSTRUMENT POUCH 6.75" X 11"

## (undated) DEVICE — TROCAR COVIDIEN VERSAPORT BLADELESS OPTICAL 12MM STANDARD

## (undated) DEVICE — DRAPE LIGHT HANDLE COVER (BLUE)

## (undated) DEVICE — DENTURE CUP PINK

## (undated) DEVICE — SUT HISTOACRYL BLUE

## (undated) DEVICE — SOL NORMOSOL-R PH7.4 1000ML

## (undated) DEVICE — SUT PROLENE 5-0 36" RB-1

## (undated) DEVICE — VENODYNE/SCD SLEEVE CALF MEDIUM

## (undated) DEVICE — NDL HYPO SAFE 22G X 1.5" (BLACK)

## (undated) DEVICE — SUT VICRYL 0 27" UR-6

## (undated) DEVICE — DRAIN PLEUROVAC CHEST DRAINAGE PEDI

## (undated) DEVICE — SOLIDIFIER 1200CC

## (undated) DEVICE — DRSG GAUZE VASELINE PETROLEUM 3 X 18"

## (undated) DEVICE — TUBING IV SET GRAVITY 1Y 78" MACRO

## (undated) DEVICE — DRAIN CHANNEL 32FR ROUND HUBLESS FULL FLUTED

## (undated) DEVICE — XI SEAL UNIV 5- 8 MM

## (undated) DEVICE — DRAPE 3/4 SHEET W REINFORCEMENT 56X77"

## (undated) DEVICE — GOWN LG

## (undated) DEVICE — SYR LUER LOK 3CC

## (undated) DEVICE — POSITIONER MATTRESS HOVERMAT SPU LINK 34"

## (undated) DEVICE — TUBING TRUWAVE PRESSURE MALE/FEMALE 72"

## (undated) DEVICE — TUBING SUCTION 20FT

## (undated) DEVICE — LAP PAD 18 X 18"

## (undated) DEVICE — NDL HYPO SAFE 22G X 1" (BLACK)

## (undated) DEVICE — UNDERPAD LINEN SAVER 17 X 24"

## (undated) DEVICE — BLADE SCALPEL SAFETYLOCK #10

## (undated) DEVICE — TUBING ENDO EXT OLYMPUS 160 24HR USE GI

## (undated) DEVICE — SUT SOFSILK 4-0 18" TIES

## (undated) DEVICE — TUBING STRYKER PNEUMOSURE HI FLOW INSUFFLATOR

## (undated) DEVICE — XI ARM FORCEP PROGRASP 8MM

## (undated) DEVICE — TUBING STRYKER PNEUMOCLEAR SMOKE EVACUATION HIGH FLOW

## (undated) DEVICE — MEDICATION LABELS W MARKER

## (undated) DEVICE — ELCTR BOVIE PENCIL HANDPIECE ROCKER SWITCH 15FT

## (undated) DEVICE — PACK AV FISTULA

## (undated) DEVICE — GLV 8.5 PROTEXIS (BLUE)

## (undated) DEVICE — BALLOON US ENDO

## (undated) DEVICE — TOURNIQUET SET 12FR (1 RED, 1 BLUE, 1 SNARE) 7"

## (undated) DEVICE — DRSG COBAN 6"

## (undated) DEVICE — SUT POLYSORB 2-0 30" GS-21 UNDYED

## (undated) DEVICE — INSUFFLATION NDL COVIDIEN SURGINEEDLE VERESS 120MM

## (undated) DEVICE — DRAPE FEMORAL ANGIOGRAPHY W TROUGH

## (undated) DEVICE — TIP METZENBAUM SCISSOR MONOPOLAR ENDOCUT (ORANGE)

## (undated) DEVICE — SAW BLADE MICROAIRE STERNUM 1.1X50X42MM

## (undated) DEVICE — DRAPE XL SHEET 77X98"

## (undated) DEVICE — INSUFFLATION NDL COVIDIEN SURGINEEDLE VERESS 150MM LONG

## (undated) DEVICE — FOLEY HOLDER STATLOCK 3 WAY ADULT

## (undated) DEVICE — PACK ROBOTIC LIJ

## (undated) DEVICE — XI DRAPE ARM

## (undated) DEVICE — PACK ROBOTIC

## (undated) DEVICE — CONTAINER FORMALIN 10% 20ML

## (undated) DEVICE — BLADE SCALPEL SAFETYLOCK #11

## (undated) DEVICE — DRAPE TOWEL BLUE 17" X 24"

## (undated) DEVICE — SUT VICRYL PLUS 0 27" UR-6

## (undated) DEVICE — XI ARM PERMANENT CAUTERY HOOK

## (undated) DEVICE — CATH IV SAFE BC 22G X 1" (BLUE)

## (undated) DEVICE — STOPCOCK 4-WAY W SWIVEL MALE LUER LOCK NON VENTED RED CAP

## (undated) DEVICE — NDL COUNTER FOAM AND MAGNET 40-70

## (undated) DEVICE — VENODYNE/SCD SLEEVE CALF LARGE

## (undated) DEVICE — ELCTR GROUNDING PAD ADULT COVIDIEN

## (undated) DEVICE — PACK UNIVERSAL CARDIAC SUPPLEMNTAL B

## (undated) DEVICE — TUBING STRYKEFLOW II SUCTION / IRRIGATOR

## (undated) DEVICE — PACING CABLE (BLUE) ATRIAL TEMP SCREW DOWN 12FT

## (undated) DEVICE — BLADE SCALPEL SAFETYLOCK #15

## (undated) DEVICE — SUT PROLENE 3-0 36" SH

## (undated) DEVICE — SUT PROLENE 4-0 36" SH

## (undated) DEVICE — DRAPE C ARM UNIVERSAL

## (undated) DEVICE — DRSG TEGADERM 6"X8"

## (undated) DEVICE — ANESTHESIA CIRCUIT ADULT HMEF

## (undated) DEVICE — GOWN TRIMAX XXL

## (undated) DEVICE — PACING CABLE TEMP MEDTRONIC WITH PAC-LOC

## (undated) DEVICE — SYR ASEPTO

## (undated) DEVICE — LABELS BLANK W PEN

## (undated) DEVICE — CLEANER FOR ELCTR TIP

## (undated) DEVICE — SUT POLYSORB 0 36" GU-46

## (undated) DEVICE — DRSG 2X2

## (undated) DEVICE — DRAPE IOBAN 23" X 23"

## (undated) DEVICE — SUT QUILL POLYPROPYLENE 1 15CM 22MM CLEAR

## (undated) DEVICE — ELCTR BOVIE BLADE 3/4" EXTENDED LENGTH 6"

## (undated) DEVICE — ENDOCATCH 10MM SPECIMEN POUCH

## (undated) DEVICE — XI ARM CLIP APPLIER LARGE

## (undated) DEVICE — GLV 6.5 PROTEXIS (WHITE)

## (undated) DEVICE — DRSG OPSITE 2.5 X 2"

## (undated) DEVICE — XI STAPLER SUREFORM 60

## (undated) DEVICE — POSITIONER FOAM EGG CRATE ULNAR 2PCS (PINK)

## (undated) DEVICE — STEALTH CLAMP INSERT FIBRA/FIBRA 90MM

## (undated) DEVICE — SUT PLEDGET PRE PUNCH 4.8 X 9.5 X 1.5 MM

## (undated) DEVICE — VESSEL LOOP MINI-BLUE 0.075" X 16"

## (undated) DEVICE — SOL IRR BAG NS 0.9% 3000ML

## (undated) DEVICE — DRSG CURITY GAUZE SPONGE 4 X 4" 12-PLY NON-STERILE

## (undated) DEVICE — PREP CHLORAPREP HI-LITE ORANGE 26ML

## (undated) DEVICE — XI ARM SCISSOR ROUND TIP 8MM

## (undated) DEVICE — DRSG OPSITE 13.75 X 4"

## (undated) DEVICE — XI ARM FORCEP FENESTRATED BIPOLAR 8MM

## (undated) DEVICE — SPECIMEN CONTAINER 100ML

## (undated) DEVICE — XI DRAPE COLUMN

## (undated) DEVICE — SUT PROLENE 6-0 4-30" BV-1

## (undated) DEVICE — WARMING BLANKET FULL UNDERBODY

## (undated) DEVICE — DEFIBRILLATOR PAD PRE-CONNECT ADULT/CHILD

## (undated) DEVICE — KIT ENDO PROCEDURE CUST W/VLV

## (undated) DEVICE — CHEST DRAIN PLEUR-EVAC WET/WET ADULT-PEDS SINGLE (QUICK)

## (undated) DEVICE — VESSEL LOOP MAXI-BLUE 0.120" X 16"

## (undated) DEVICE — SUCTION YANKAUER NO CONTROL VENT

## (undated) DEVICE — ELCTR BOVIE TIP CLEANER SCRATCH PAD

## (undated) DEVICE — TUBING CONNECTING 6MM 20FT

## (undated) DEVICE — XI VESSEL SEALER

## (undated) DEVICE — ELCTR CORD FOOTSWITCH 1PLR LAPSCP 10FT

## (undated) DEVICE — SUT BIOSYN 4-0 18" P-12

## (undated) DEVICE — SAW BLADE MICROAIRE STERNUM 1X34X9.4MM

## (undated) DEVICE — URETERAL CATH RED RUBBER 8FR

## (undated) DEVICE — DRAPE TOWEL BLUE STICKY

## (undated) DEVICE — CLAMP BULLDOG MIDI 45 DEGREE (GREEN) DISP

## (undated) DEVICE — GLV 8 RADIATION

## (undated) DEVICE — SUT POLYSORB 3-0 30" V-20 UNDYED

## (undated) DEVICE — BLADE SURGICAL #15 CARBON

## (undated) DEVICE — FEEDING TUBE NG SUMP 16FR 48"

## (undated) DEVICE — XI OBTURATOR OPTICAL BLADELESS 8MM

## (undated) DEVICE — GOWN XL

## (undated) DEVICE — WARMING BLANKET UPPER ADULT

## (undated) DEVICE — SOLIDIFIER ISOLYZER 2000 CC

## (undated) DEVICE — GLV 7.5 PROTEXIS (WHITE)

## (undated) DEVICE — SUT SOFSILK 0 30" V-20

## (undated) DEVICE — FOLEY TRAY 16FR 5CC LF LUBRISIL ADVANCE TEMP CLOSED

## (undated) DEVICE — SUT PROLENE 4-0 36" BB

## (undated) DEVICE — CONNECTOR "Y" 1/4 X 1/4 X 1/4"

## (undated) DEVICE — CLAMP BULLDOG MIDI STRAIGHT (YELLOW) DISP

## (undated) DEVICE — XI ARM FORCEP CADIERE 8MM